# Patient Record
Sex: FEMALE | Race: BLACK OR AFRICAN AMERICAN | Employment: UNEMPLOYED | URBAN - METROPOLITAN AREA
[De-identification: names, ages, dates, MRNs, and addresses within clinical notes are randomized per-mention and may not be internally consistent; named-entity substitution may affect disease eponyms.]

---

## 2017-02-10 ENCOUNTER — HOSPITAL ENCOUNTER (INPATIENT)
Age: 52
LOS: 10 days | Discharge: HOME | DRG: 885 | End: 2017-02-20
Attending: EMERGENCY MEDICINE | Admitting: PSYCHIATRY & NEUROLOGY
Payer: COMMERCIAL

## 2017-02-10 DIAGNOSIS — F32.A DEPRESSION, UNSPECIFIED DEPRESSION TYPE: Primary | ICD-10-CM

## 2017-02-10 LAB — GLUCOSE BLD-MCNC: 240 MG/DL (ref 65–105)

## 2017-02-10 PROCEDURE — 1240000000 HC EMOTIONAL WELLNESS R&B

## 2017-02-10 PROCEDURE — 6360000002 HC RX W HCPCS: Performed by: EMERGENCY MEDICINE

## 2017-02-10 PROCEDURE — 82947 ASSAY GLUCOSE BLOOD QUANT: CPT

## 2017-02-10 PROCEDURE — 99285 EMERGENCY DEPT VISIT HI MDM: CPT

## 2017-02-10 RX ORDER — LORAZEPAM 2 MG/ML
2 INJECTION INTRAMUSCULAR ONCE
Status: COMPLETED | OUTPATIENT
Start: 2017-02-10 | End: 2017-02-10

## 2017-02-10 RX ADMIN — LORAZEPAM 2 MG: 2 INJECTION INTRAMUSCULAR; INTRAVENOUS at 23:33

## 2017-02-10 ASSESSMENT — SLEEP AND FATIGUE QUESTIONNAIRES
DO YOU HAVE DIFFICULTY SLEEPING: YES
DIFFICULTY ARISING: YES
DO YOU USE A SLEEP AID: NO
AVERAGE NUMBER OF SLEEP HOURS: 0
DIFFICULTY FALLING ASLEEP: YES
DIFFICULTY STAYING ASLEEP: YES
RESTFUL SLEEP: NO

## 2017-02-10 ASSESSMENT — ENCOUNTER SYMPTOMS
RESPIRATORY NEGATIVE: 1
EYES NEGATIVE: 1
SHORTNESS OF BREATH: 0
BACK PAIN: 0
ABDOMINAL PAIN: 0
GASTROINTESTINAL NEGATIVE: 1
COUGH: 0

## 2017-02-10 ASSESSMENT — LIFESTYLE VARIABLES: HISTORY_ALCOHOL_USE: YES

## 2017-02-10 ASSESSMENT — PAIN SCALES - GENERAL: PAINLEVEL_OUTOF10: 8

## 2017-02-10 ASSESSMENT — PATIENT HEALTH QUESTIONNAIRE - PHQ9: SUM OF ALL RESPONSES TO PHQ QUESTIONS 1-9: 18

## 2017-02-10 ASSESSMENT — PAIN DESCRIPTION - LOCATION: LOCATION: BACK

## 2017-02-11 LAB — GLUCOSE BLD-MCNC: 191 MG/DL (ref 65–105)

## 2017-02-11 PROCEDURE — 82947 ASSAY GLUCOSE BLOOD QUANT: CPT

## 2017-02-11 PROCEDURE — 1240000000 HC EMOTIONAL WELLNESS R&B

## 2017-02-11 PROCEDURE — 81001 URINALYSIS AUTO W/SCOPE: CPT

## 2017-02-11 PROCEDURE — 80307 DRUG TEST PRSMV CHEM ANLYZR: CPT

## 2017-02-11 PROCEDURE — 6370000000 HC RX 637 (ALT 250 FOR IP): Performed by: PSYCHIATRY & NEUROLOGY

## 2017-02-11 RX ORDER — GABAPENTIN 300 MG/1
300 CAPSULE ORAL 3 TIMES DAILY
Status: DISCONTINUED | OUTPATIENT
Start: 2017-02-11 | End: 2017-02-12

## 2017-02-11 RX ORDER — IBUPROFEN 800 MG/1
800 TABLET ORAL 3 TIMES DAILY PRN
Status: DISCONTINUED | OUTPATIENT
Start: 2017-02-11 | End: 2017-02-15

## 2017-02-11 RX ORDER — DEXTROSE MONOHYDRATE 50 MG/ML
100 INJECTION, SOLUTION INTRAVENOUS PRN
Status: DISCONTINUED | OUTPATIENT
Start: 2017-02-11 | End: 2017-02-20 | Stop reason: HOSPADM

## 2017-02-11 RX ORDER — ESCITALOPRAM OXALATE 10 MG/1
10 TABLET ORAL DAILY
Status: DISCONTINUED | OUTPATIENT
Start: 2017-02-11 | End: 2017-02-12

## 2017-02-11 RX ORDER — INSULIN GLARGINE 100 [IU]/ML
32 INJECTION, SOLUTION SUBCUTANEOUS 2 TIMES DAILY
Status: DISCONTINUED | OUTPATIENT
Start: 2017-02-11 | End: 2017-02-14

## 2017-02-11 RX ORDER — BENZTROPINE MESYLATE 1 MG/ML
2 INJECTION INTRAMUSCULAR; INTRAVENOUS DAILY PRN
Status: DISCONTINUED | OUTPATIENT
Start: 2017-02-11 | End: 2017-02-20 | Stop reason: HOSPADM

## 2017-02-11 RX ORDER — HYDROXYZINE HYDROCHLORIDE 25 MG/1
50 TABLET, FILM COATED ORAL 3 TIMES DAILY PRN
Status: DISCONTINUED | OUTPATIENT
Start: 2017-02-11 | End: 2017-02-20 | Stop reason: HOSPADM

## 2017-02-11 RX ORDER — TRAZODONE HYDROCHLORIDE 150 MG/1
150 TABLET ORAL NIGHTLY
Status: DISCONTINUED | OUTPATIENT
Start: 2017-02-11 | End: 2017-02-20 | Stop reason: HOSPADM

## 2017-02-11 RX ORDER — ACETAMINOPHEN 325 MG/1
650 TABLET ORAL EVERY 4 HOURS PRN
Status: DISCONTINUED | OUTPATIENT
Start: 2017-02-11 | End: 2017-02-20 | Stop reason: HOSPADM

## 2017-02-11 RX ORDER — NICOTINE 21 MG/24HR
1 PATCH, TRANSDERMAL 24 HOURS TRANSDERMAL DAILY
Status: DISCONTINUED | OUTPATIENT
Start: 2017-02-11 | End: 2017-02-20 | Stop reason: HOSPADM

## 2017-02-11 RX ORDER — ZOLPIDEM TARTRATE 10 MG/1
10 TABLET ORAL NIGHTLY PRN
Status: DISCONTINUED | OUTPATIENT
Start: 2017-02-11 | End: 2017-02-12

## 2017-02-11 RX ORDER — DEXTROSE MONOHYDRATE 25 G/50ML
12.5 INJECTION, SOLUTION INTRAVENOUS PRN
Status: DISCONTINUED | OUTPATIENT
Start: 2017-02-11 | End: 2017-02-20 | Stop reason: HOSPADM

## 2017-02-11 RX ORDER — NICOTINE POLACRILEX 4 MG
15 LOZENGE BUCCAL PRN
Status: DISCONTINUED | OUTPATIENT
Start: 2017-02-11 | End: 2017-02-20 | Stop reason: HOSPADM

## 2017-02-11 RX ORDER — MAGNESIUM HYDROXIDE/ALUMINUM HYDROXICE/SIMETHICONE 120; 1200; 1200 MG/30ML; MG/30ML; MG/30ML
20 SUSPENSION ORAL 3 TIMES DAILY PRN
Status: DISCONTINUED | OUTPATIENT
Start: 2017-02-11 | End: 2017-02-20 | Stop reason: HOSPADM

## 2017-02-11 RX ADMIN — ESCITALOPRAM OXALATE 10 MG: 10 TABLET ORAL at 12:47

## 2017-02-11 RX ADMIN — TRAZODONE HYDROCHLORIDE 150 MG: 150 TABLET ORAL at 21:33

## 2017-02-11 RX ADMIN — IBUPROFEN 800 MG: 800 TABLET, FILM COATED ORAL at 21:33

## 2017-02-11 RX ADMIN — IBUPROFEN 800 MG: 800 TABLET, FILM COATED ORAL at 12:47

## 2017-02-11 RX ADMIN — HYDROXYZINE HYDROCHLORIDE 50 MG: 25 TABLET, FILM COATED ORAL at 12:47

## 2017-02-11 RX ADMIN — HYDROXYZINE HYDROCHLORIDE 50 MG: 25 TABLET, FILM COATED ORAL at 21:34

## 2017-02-11 RX ADMIN — GABAPENTIN 300 MG: 300 CAPSULE ORAL at 14:23

## 2017-02-11 RX ADMIN — ZOLPIDEM TARTRATE 10 MG: 10 TABLET, FILM COATED ORAL at 21:33

## 2017-02-11 RX ADMIN — GABAPENTIN 300 MG: 300 CAPSULE ORAL at 21:33

## 2017-02-11 ASSESSMENT — PAIN SCALES - GENERAL
PAINLEVEL_OUTOF10: 8
PAINLEVEL_OUTOF10: 7
PAINLEVEL_OUTOF10: 3

## 2017-02-11 ASSESSMENT — LIFESTYLE VARIABLES: HISTORY_ALCOHOL_USE: NO

## 2017-02-11 ASSESSMENT — SLEEP AND FATIGUE QUESTIONNAIRES
DO YOU USE A SLEEP AID: NO
SLEEP PATTERN: DIFFICULTY FALLING ASLEEP;DISTURBED/INTERRUPTED SLEEP;RESTLESSNESS
DO YOU HAVE DIFFICULTY SLEEPING: YES
AVERAGE NUMBER OF SLEEP HOURS: 2
DIFFICULTY ARISING: YES
DIFFICULTY FALLING ASLEEP: YES
DIFFICULTY STAYING ASLEEP: YES
RESTFUL SLEEP: NO

## 2017-02-11 ASSESSMENT — PATIENT HEALTH QUESTIONNAIRE - PHQ9: SUM OF ALL RESPONSES TO PHQ QUESTIONS 1-9: 18

## 2017-02-12 LAB
CREAT SERPL-MCNC: 1.08 MG/DL (ref 0.5–0.9)
GFR AFRICAN AMERICAN: >60 ML/MIN
GFR NON-AFRICAN AMERICAN: 54 ML/MIN
GFR SERPL CREATININE-BSD FRML MDRD: ABNORMAL ML/MIN/{1.73_M2}
GFR SERPL CREATININE-BSD FRML MDRD: ABNORMAL ML/MIN/{1.73_M2}

## 2017-02-12 PROCEDURE — 6370000000 HC RX 637 (ALT 250 FOR IP): Performed by: PSYCHIATRY & NEUROLOGY

## 2017-02-12 PROCEDURE — 36415 COLL VENOUS BLD VENIPUNCTURE: CPT

## 2017-02-12 PROCEDURE — 1240000000 HC EMOTIONAL WELLNESS R&B

## 2017-02-12 PROCEDURE — 82565 ASSAY OF CREATININE: CPT

## 2017-02-12 RX ORDER — ZOLPIDEM TARTRATE 10 MG/1
10 TABLET ORAL NIGHTLY
Status: DISCONTINUED | OUTPATIENT
Start: 2017-02-12 | End: 2017-02-20 | Stop reason: HOSPADM

## 2017-02-12 RX ORDER — GABAPENTIN 300 MG/1
900 CAPSULE ORAL 3 TIMES DAILY
Status: DISCONTINUED | OUTPATIENT
Start: 2017-02-12 | End: 2017-02-20 | Stop reason: HOSPADM

## 2017-02-12 RX ORDER — CITALOPRAM 40 MG/1
40 TABLET ORAL DAILY
Status: DISCONTINUED | OUTPATIENT
Start: 2017-02-13 | End: 2017-02-20 | Stop reason: HOSPADM

## 2017-02-12 RX ADMIN — IBUPROFEN 800 MG: 800 TABLET, FILM COATED ORAL at 22:06

## 2017-02-12 RX ADMIN — ESCITALOPRAM OXALATE 10 MG: 10 TABLET ORAL at 08:48

## 2017-02-12 RX ADMIN — GABAPENTIN 300 MG: 300 CAPSULE ORAL at 08:48

## 2017-02-12 RX ADMIN — TRAZODONE HYDROCHLORIDE 150 MG: 150 TABLET ORAL at 22:00

## 2017-02-12 RX ADMIN — HYDROXYZINE HYDROCHLORIDE 50 MG: 25 TABLET, FILM COATED ORAL at 15:01

## 2017-02-12 RX ADMIN — IBUPROFEN 800 MG: 800 TABLET, FILM COATED ORAL at 08:48

## 2017-02-12 RX ADMIN — HYDROXYZINE HYDROCHLORIDE 50 MG: 25 TABLET, FILM COATED ORAL at 22:00

## 2017-02-12 RX ADMIN — GABAPENTIN 300 MG: 300 CAPSULE ORAL at 14:20

## 2017-02-12 RX ADMIN — ZOLPIDEM TARTRATE 10 MG: 10 TABLET, COATED ORAL at 22:00

## 2017-02-12 RX ADMIN — GABAPENTIN 900 MG: 300 CAPSULE ORAL at 21:59

## 2017-02-12 ASSESSMENT — PAIN SCALES - GENERAL: PAINLEVEL_OUTOF10: 3

## 2017-02-13 LAB — GLUCOSE BLD-MCNC: 209 MG/DL (ref 65–105)

## 2017-02-13 PROCEDURE — 6370000000 HC RX 637 (ALT 250 FOR IP): Performed by: PSYCHIATRY & NEUROLOGY

## 2017-02-13 PROCEDURE — 1240000000 HC EMOTIONAL WELLNESS R&B

## 2017-02-13 PROCEDURE — 82947 ASSAY GLUCOSE BLOOD QUANT: CPT

## 2017-02-13 RX ORDER — PRAZOSIN HYDROCHLORIDE 1 MG/1
1 CAPSULE ORAL NIGHTLY
Status: DISCONTINUED | OUTPATIENT
Start: 2017-02-13 | End: 2017-02-20 | Stop reason: HOSPADM

## 2017-02-13 RX ADMIN — METFORMIN HYDROCHLORIDE 1000 MG: 500 TABLET, FILM COATED ORAL at 09:15

## 2017-02-13 RX ADMIN — ACETAMINOPHEN 650 MG: 325 TABLET ORAL at 21:34

## 2017-02-13 RX ADMIN — HYDROXYZINE HYDROCHLORIDE 50 MG: 25 TABLET, FILM COATED ORAL at 09:15

## 2017-02-13 RX ADMIN — GABAPENTIN 900 MG: 300 CAPSULE ORAL at 13:41

## 2017-02-13 RX ADMIN — ZOLPIDEM TARTRATE 10 MG: 10 TABLET, COATED ORAL at 21:34

## 2017-02-13 RX ADMIN — CITALOPRAM HYDROBROMIDE 40 MG: 40 TABLET ORAL at 09:15

## 2017-02-13 RX ADMIN — GABAPENTIN 900 MG: 300 CAPSULE ORAL at 09:15

## 2017-02-13 RX ADMIN — TRAZODONE HYDROCHLORIDE 150 MG: 150 TABLET ORAL at 21:34

## 2017-02-13 RX ADMIN — HYDROXYZINE HYDROCHLORIDE 50 MG: 25 TABLET, FILM COATED ORAL at 19:44

## 2017-02-13 RX ADMIN — GABAPENTIN 900 MG: 300 CAPSULE ORAL at 21:34

## 2017-02-13 RX ADMIN — IBUPROFEN 800 MG: 800 TABLET, FILM COATED ORAL at 19:43

## 2017-02-13 RX ADMIN — PRAZOSIN HYDROCHLORIDE 1 MG: 1 CAPSULE ORAL at 21:34

## 2017-02-13 ASSESSMENT — PAIN DESCRIPTION - PAIN TYPE
TYPE: ACUTE PAIN
TYPE: ACUTE PAIN

## 2017-02-13 ASSESSMENT — PAIN SCALES - GENERAL
PAINLEVEL_OUTOF10: 0
PAINLEVEL_OUTOF10: 3
PAINLEVEL_OUTOF10: 3

## 2017-02-13 ASSESSMENT — PAIN DESCRIPTION - LOCATION
LOCATION: HEAD
LOCATION: HEAD

## 2017-02-14 LAB — GLUCOSE BLD-MCNC: 209 MG/DL (ref 65–105)

## 2017-02-14 PROCEDURE — 82947 ASSAY GLUCOSE BLOOD QUANT: CPT

## 2017-02-14 PROCEDURE — 1240000000 HC EMOTIONAL WELLNESS R&B

## 2017-02-14 PROCEDURE — 6370000000 HC RX 637 (ALT 250 FOR IP): Performed by: PSYCHIATRY & NEUROLOGY

## 2017-02-14 RX ORDER — PIOGLITAZONEHYDROCHLORIDE 15 MG/1
30 TABLET ORAL DAILY
Status: DISCONTINUED | OUTPATIENT
Start: 2017-02-14 | End: 2017-02-20 | Stop reason: HOSPADM

## 2017-02-14 RX ORDER — TRAMADOL HYDROCHLORIDE 50 MG/1
50 TABLET ORAL 3 TIMES DAILY PRN
Status: DISCONTINUED | OUTPATIENT
Start: 2017-02-14 | End: 2017-02-20 | Stop reason: HOSPADM

## 2017-02-14 RX ORDER — PIOGLITAZONEHYDROCHLORIDE 30 MG/1
30 TABLET ORAL DAILY
Status: ON HOLD | COMMUNITY
End: 2017-09-12 | Stop reason: HOSPADM

## 2017-02-14 RX ADMIN — PIOGLITAZONE 30 MG: 15 TABLET ORAL at 19:34

## 2017-02-14 RX ADMIN — GABAPENTIN 900 MG: 300 CAPSULE ORAL at 20:34

## 2017-02-14 RX ADMIN — HYDROXYZINE HYDROCHLORIDE 50 MG: 25 TABLET, FILM COATED ORAL at 08:25

## 2017-02-14 RX ADMIN — TRAZODONE HYDROCHLORIDE 150 MG: 150 TABLET ORAL at 20:34

## 2017-02-14 RX ADMIN — PRAZOSIN HYDROCHLORIDE 1 MG: 1 CAPSULE ORAL at 20:34

## 2017-02-14 RX ADMIN — GABAPENTIN 900 MG: 300 CAPSULE ORAL at 13:44

## 2017-02-14 RX ADMIN — CITALOPRAM HYDROBROMIDE 40 MG: 40 TABLET ORAL at 08:20

## 2017-02-14 RX ADMIN — ZOLPIDEM TARTRATE 10 MG: 10 TABLET, COATED ORAL at 20:34

## 2017-02-14 RX ADMIN — METFORMIN HYDROCHLORIDE 1000 MG: 500 TABLET, FILM COATED ORAL at 21:20

## 2017-02-14 RX ADMIN — GABAPENTIN 900 MG: 300 CAPSULE ORAL at 08:20

## 2017-02-14 RX ADMIN — METFORMIN HYDROCHLORIDE 1000 MG: 500 TABLET, FILM COATED ORAL at 08:20

## 2017-02-14 RX ADMIN — IBUPROFEN 800 MG: 800 TABLET, FILM COATED ORAL at 20:34

## 2017-02-14 RX ADMIN — TRAMADOL HYDROCHLORIDE 50 MG: 50 TABLET, FILM COATED ORAL at 19:16

## 2017-02-14 RX ADMIN — HYDROXYZINE HYDROCHLORIDE 50 MG: 25 TABLET, FILM COATED ORAL at 20:34

## 2017-02-14 ASSESSMENT — PAIN DESCRIPTION - PAIN TYPE: TYPE: ACUTE PAIN

## 2017-02-14 ASSESSMENT — PAIN - FUNCTIONAL ASSESSMENT: PAIN_FUNCTIONAL_ASSESSMENT: 0-10

## 2017-02-14 ASSESSMENT — PAIN SCALES - GENERAL
PAINLEVEL_OUTOF10: 9
PAINLEVEL_OUTOF10: 9
PAINLEVEL_OUTOF10: 10

## 2017-02-14 ASSESSMENT — PAIN DESCRIPTION - LOCATION: LOCATION: BACK;SHOULDER

## 2017-02-15 LAB
GLUCOSE BLD-MCNC: 156 MG/DL (ref 65–105)
GLUCOSE BLD-MCNC: 172 MG/DL (ref 65–105)

## 2017-02-15 PROCEDURE — 6370000000 HC RX 637 (ALT 250 FOR IP): Performed by: PSYCHIATRY & NEUROLOGY

## 2017-02-15 PROCEDURE — 82947 ASSAY GLUCOSE BLOOD QUANT: CPT

## 2017-02-15 PROCEDURE — 1240000000 HC EMOTIONAL WELLNESS R&B

## 2017-02-15 RX ORDER — NAPROXEN 500 MG/1
500 TABLET ORAL 2 TIMES DAILY WITH MEALS
Status: DISCONTINUED | OUTPATIENT
Start: 2017-02-15 | End: 2017-02-20 | Stop reason: HOSPADM

## 2017-02-15 RX ADMIN — TRAMADOL HYDROCHLORIDE 50 MG: 50 TABLET, FILM COATED ORAL at 08:27

## 2017-02-15 RX ADMIN — PIOGLITAZONE 30 MG: 15 TABLET ORAL at 08:27

## 2017-02-15 RX ADMIN — METFORMIN HYDROCHLORIDE 1000 MG: 500 TABLET, FILM COATED ORAL at 20:42

## 2017-02-15 RX ADMIN — CITALOPRAM HYDROBROMIDE 40 MG: 40 TABLET ORAL at 08:27

## 2017-02-15 RX ADMIN — TRAMADOL HYDROCHLORIDE 50 MG: 50 TABLET, FILM COATED ORAL at 19:38

## 2017-02-15 RX ADMIN — HYDROXYZINE HYDROCHLORIDE 50 MG: 25 TABLET, FILM COATED ORAL at 19:36

## 2017-02-15 RX ADMIN — IBUPROFEN 800 MG: 800 TABLET, FILM COATED ORAL at 11:40

## 2017-02-15 RX ADMIN — GABAPENTIN 900 MG: 300 CAPSULE ORAL at 08:28

## 2017-02-15 RX ADMIN — ACETAMINOPHEN 650 MG: 325 TABLET ORAL at 12:54

## 2017-02-15 RX ADMIN — GABAPENTIN 900 MG: 300 CAPSULE ORAL at 20:42

## 2017-02-15 RX ADMIN — METFORMIN HYDROCHLORIDE 1000 MG: 500 TABLET, FILM COATED ORAL at 08:27

## 2017-02-15 RX ADMIN — GABAPENTIN 900 MG: 300 CAPSULE ORAL at 14:03

## 2017-02-15 RX ADMIN — NAPROXEN 500 MG: 500 TABLET ORAL at 18:08

## 2017-02-15 RX ADMIN — TRAZODONE HYDROCHLORIDE 150 MG: 150 TABLET ORAL at 20:42

## 2017-02-15 RX ADMIN — ZOLPIDEM TARTRATE 10 MG: 10 TABLET, COATED ORAL at 20:42

## 2017-02-15 RX ADMIN — PRAZOSIN HYDROCHLORIDE 1 MG: 1 CAPSULE ORAL at 20:42

## 2017-02-15 ASSESSMENT — PAIN SCALES - GENERAL
PAINLEVEL_OUTOF10: 3
PAINLEVEL_OUTOF10: 1
PAINLEVEL_OUTOF10: 8
PAINLEVEL_OUTOF10: 10
PAINLEVEL_OUTOF10: 0
PAINLEVEL_OUTOF10: 5
PAINLEVEL_OUTOF10: 5
PAINLEVEL_OUTOF10: 9
PAINLEVEL_OUTOF10: 1

## 2017-02-15 ASSESSMENT — PAIN DESCRIPTION - PAIN TYPE
TYPE: CHRONIC PAIN

## 2017-02-15 ASSESSMENT — PAIN DESCRIPTION - LOCATION
LOCATION: GENERALIZED

## 2017-02-16 LAB — GLUCOSE BLD-MCNC: 110 MG/DL (ref 65–105)

## 2017-02-16 PROCEDURE — 82947 ASSAY GLUCOSE BLOOD QUANT: CPT

## 2017-02-16 PROCEDURE — 1240000000 HC EMOTIONAL WELLNESS R&B

## 2017-02-16 PROCEDURE — 6370000000 HC RX 637 (ALT 250 FOR IP): Performed by: PSYCHIATRY & NEUROLOGY

## 2017-02-16 RX ADMIN — HYDROXYZINE HYDROCHLORIDE 50 MG: 25 TABLET, FILM COATED ORAL at 08:29

## 2017-02-16 RX ADMIN — ZOLPIDEM TARTRATE 10 MG: 10 TABLET, COATED ORAL at 20:39

## 2017-02-16 RX ADMIN — METFORMIN HYDROCHLORIDE 1000 MG: 500 TABLET, FILM COATED ORAL at 20:39

## 2017-02-16 RX ADMIN — GABAPENTIN 900 MG: 300 CAPSULE ORAL at 13:04

## 2017-02-16 RX ADMIN — CITALOPRAM HYDROBROMIDE 40 MG: 40 TABLET ORAL at 08:27

## 2017-02-16 RX ADMIN — NAPROXEN 500 MG: 500 TABLET ORAL at 08:27

## 2017-02-16 RX ADMIN — NAPROXEN 500 MG: 500 TABLET ORAL at 17:30

## 2017-02-16 RX ADMIN — TRAMADOL HYDROCHLORIDE 50 MG: 50 TABLET, FILM COATED ORAL at 20:38

## 2017-02-16 RX ADMIN — METFORMIN HYDROCHLORIDE 1000 MG: 500 TABLET, FILM COATED ORAL at 08:27

## 2017-02-16 RX ADMIN — TRAZODONE HYDROCHLORIDE 150 MG: 150 TABLET ORAL at 20:39

## 2017-02-16 RX ADMIN — ACETAMINOPHEN 650 MG: 325 TABLET ORAL at 12:02

## 2017-02-16 RX ADMIN — TRAMADOL HYDROCHLORIDE 50 MG: 50 TABLET, FILM COATED ORAL at 09:47

## 2017-02-16 RX ADMIN — GABAPENTIN 900 MG: 300 CAPSULE ORAL at 08:26

## 2017-02-16 RX ADMIN — PIOGLITAZONE 30 MG: 15 TABLET ORAL at 08:27

## 2017-02-16 RX ADMIN — GABAPENTIN 900 MG: 300 CAPSULE ORAL at 20:39

## 2017-02-16 RX ADMIN — PRAZOSIN HYDROCHLORIDE 1 MG: 1 CAPSULE ORAL at 20:39

## 2017-02-16 ASSESSMENT — PAIN - FUNCTIONAL ASSESSMENT: PAIN_FUNCTIONAL_ASSESSMENT: 0-10

## 2017-02-16 ASSESSMENT — PAIN SCALES - GENERAL
PAINLEVEL_OUTOF10: 3
PAINLEVEL_OUTOF10: 10
PAINLEVEL_OUTOF10: 6
PAINLEVEL_OUTOF10: 3
PAINLEVEL_OUTOF10: 10
PAINLEVEL_OUTOF10: 5
PAINLEVEL_OUTOF10: 3

## 2017-02-16 ASSESSMENT — PAIN DESCRIPTION - PAIN TYPE: TYPE: CHRONIC PAIN

## 2017-02-16 ASSESSMENT — PAIN DESCRIPTION - LOCATION
LOCATION: BACK
LOCATION: HEAD

## 2017-02-17 LAB
GLUCOSE BLD-MCNC: 114 MG/DL (ref 65–105)
GLUCOSE BLD-MCNC: 78 MG/DL (ref 65–105)

## 2017-02-17 PROCEDURE — 82947 ASSAY GLUCOSE BLOOD QUANT: CPT

## 2017-02-17 PROCEDURE — 6370000000 HC RX 637 (ALT 250 FOR IP): Performed by: PSYCHIATRY & NEUROLOGY

## 2017-02-17 PROCEDURE — 1240000000 HC EMOTIONAL WELLNESS R&B

## 2017-02-17 RX ORDER — ARIPIPRAZOLE 5 MG/1
5 TABLET ORAL DAILY
Status: DISCONTINUED | OUTPATIENT
Start: 2017-02-17 | End: 2017-02-19

## 2017-02-17 RX ADMIN — NAPROXEN 500 MG: 500 TABLET ORAL at 18:02

## 2017-02-17 RX ADMIN — CITALOPRAM HYDROBROMIDE 40 MG: 40 TABLET ORAL at 08:52

## 2017-02-17 RX ADMIN — ARIPIPRAZOLE 5 MG: 5 TABLET ORAL at 12:09

## 2017-02-17 RX ADMIN — TRAMADOL HYDROCHLORIDE 50 MG: 50 TABLET, FILM COATED ORAL at 19:24

## 2017-02-17 RX ADMIN — TRAMADOL HYDROCHLORIDE 50 MG: 50 TABLET, FILM COATED ORAL at 08:52

## 2017-02-17 RX ADMIN — GABAPENTIN 900 MG: 300 CAPSULE ORAL at 15:00

## 2017-02-17 RX ADMIN — NAPROXEN 500 MG: 500 TABLET ORAL at 08:52

## 2017-02-17 RX ADMIN — TRAZODONE HYDROCHLORIDE 150 MG: 150 TABLET ORAL at 20:49

## 2017-02-17 RX ADMIN — ZOLPIDEM TARTRATE 10 MG: 10 TABLET, COATED ORAL at 20:49

## 2017-02-17 RX ADMIN — PIOGLITAZONE 30 MG: 15 TABLET ORAL at 08:51

## 2017-02-17 RX ADMIN — HYDROXYZINE HYDROCHLORIDE 50 MG: 25 TABLET, FILM COATED ORAL at 20:53

## 2017-02-17 RX ADMIN — GABAPENTIN 900 MG: 300 CAPSULE ORAL at 08:52

## 2017-02-17 RX ADMIN — METFORMIN HYDROCHLORIDE 1000 MG: 500 TABLET, FILM COATED ORAL at 08:52

## 2017-02-17 RX ADMIN — ACETAMINOPHEN 650 MG: 325 TABLET ORAL at 20:54

## 2017-02-17 RX ADMIN — PRAZOSIN HYDROCHLORIDE 1 MG: 1 CAPSULE ORAL at 20:49

## 2017-02-17 RX ADMIN — METFORMIN HYDROCHLORIDE 1000 MG: 500 TABLET, FILM COATED ORAL at 20:49

## 2017-02-17 RX ADMIN — GABAPENTIN 900 MG: 300 CAPSULE ORAL at 20:49

## 2017-02-17 ASSESSMENT — PAIN SCALES - GENERAL
PAINLEVEL_OUTOF10: 9
PAINLEVEL_OUTOF10: 3
PAINLEVEL_OUTOF10: 0
PAINLEVEL_OUTOF10: 10
PAINLEVEL_OUTOF10: 10
PAINLEVEL_OUTOF10: 9

## 2017-02-17 ASSESSMENT — PAIN - FUNCTIONAL ASSESSMENT
PAIN_FUNCTIONAL_ASSESSMENT: 0-10
PAIN_FUNCTIONAL_ASSESSMENT: 0-10

## 2017-02-18 LAB
-: ABNORMAL
AMORPHOUS: ABNORMAL
AMPHETAMINE SCREEN URINE: NEGATIVE
BACTERIA: ABNORMAL
BARBITURATE SCREEN URINE: NEGATIVE
BENZODIAZEPINE SCREEN, URINE: NEGATIVE
BILIRUBIN URINE: NEGATIVE
BUPRENORPHINE URINE: NORMAL
CANNABINOID SCREEN URINE: NEGATIVE
CASTS UA: ABNORMAL /LPF
COCAINE METABOLITE, URINE: NEGATIVE
COLOR: YELLOW
COMMENT UA: ABNORMAL
CRYSTALS, UA: ABNORMAL /HPF
EPITHELIAL CELLS UA: ABNORMAL /HPF
GLUCOSE BLD-MCNC: 114 MG/DL (ref 65–105)
GLUCOSE BLD-MCNC: 92 MG/DL (ref 65–105)
GLUCOSE URINE: NEGATIVE
KETONES, URINE: NEGATIVE
LEUKOCYTE ESTERASE, URINE: ABNORMAL
MDMA URINE: NORMAL
METHADONE SCREEN, URINE: NEGATIVE
METHAMPHETAMINE, URINE: NORMAL
MUCUS: ABNORMAL
NITRITE, URINE: NEGATIVE
OPIATES, URINE: NEGATIVE
OTHER OBSERVATIONS UA: ABNORMAL
OXYCODONE SCREEN URINE: NEGATIVE
PH UA: 6 (ref 5–8)
PHENCYCLIDINE, URINE: NEGATIVE
PROPOXYPHENE, URINE: NORMAL
PROTEIN UA: NEGATIVE
RBC UA: ABNORMAL /HPF
RENAL EPITHELIAL, UA: ABNORMAL /HPF
SPECIFIC GRAVITY UA: 1.02 (ref 1–1.03)
TEST INFORMATION: NORMAL
TRICHOMONAS: ABNORMAL
TRICYCLIC ANTIDEPRESSANTS, UR: NORMAL
TURBIDITY: CLEAR
URINE HGB: NEGATIVE
UROBILINOGEN, URINE: NORMAL
WBC UA: ABNORMAL /HPF
YEAST: ABNORMAL

## 2017-02-18 PROCEDURE — 82947 ASSAY GLUCOSE BLOOD QUANT: CPT

## 2017-02-18 PROCEDURE — 6370000000 HC RX 637 (ALT 250 FOR IP): Performed by: PSYCHIATRY & NEUROLOGY

## 2017-02-18 PROCEDURE — 87086 URINE CULTURE/COLONY COUNT: CPT

## 2017-02-18 PROCEDURE — 6360000002 HC RX W HCPCS: Performed by: PSYCHIATRY & NEUROLOGY

## 2017-02-18 PROCEDURE — 1240000000 HC EMOTIONAL WELLNESS R&B

## 2017-02-18 RX ORDER — ONDANSETRON 4 MG/1
4 TABLET, ORALLY DISINTEGRATING ORAL EVERY 4 HOURS PRN
Status: DISCONTINUED | OUTPATIENT
Start: 2017-02-18 | End: 2017-02-20 | Stop reason: HOSPADM

## 2017-02-18 RX ORDER — PROMETHAZINE HYDROCHLORIDE 25 MG/1
25 TABLET ORAL EVERY 6 HOURS PRN
Status: DISCONTINUED | OUTPATIENT
Start: 2017-02-18 | End: 2017-02-20 | Stop reason: HOSPADM

## 2017-02-18 RX ADMIN — METFORMIN HYDROCHLORIDE 1000 MG: 500 TABLET, FILM COATED ORAL at 08:55

## 2017-02-18 RX ADMIN — TRAMADOL HYDROCHLORIDE 50 MG: 50 TABLET, FILM COATED ORAL at 08:55

## 2017-02-18 RX ADMIN — PROMETHAZINE HYDROCHLORIDE 25 MG: 25 TABLET ORAL at 21:12

## 2017-02-18 RX ADMIN — METFORMIN HYDROCHLORIDE 1000 MG: 500 TABLET, FILM COATED ORAL at 21:12

## 2017-02-18 RX ADMIN — PIOGLITAZONE 30 MG: 15 TABLET ORAL at 08:56

## 2017-02-18 RX ADMIN — CITALOPRAM HYDROBROMIDE 40 MG: 40 TABLET ORAL at 08:55

## 2017-02-18 RX ADMIN — TRAZODONE HYDROCHLORIDE 150 MG: 150 TABLET ORAL at 21:12

## 2017-02-18 RX ADMIN — ARIPIPRAZOLE 5 MG: 5 TABLET ORAL at 08:55

## 2017-02-18 RX ADMIN — TRAMADOL HYDROCHLORIDE 50 MG: 50 TABLET, FILM COATED ORAL at 21:12

## 2017-02-18 RX ADMIN — GABAPENTIN 900 MG: 300 CAPSULE ORAL at 21:12

## 2017-02-18 RX ADMIN — PRAZOSIN HYDROCHLORIDE 1 MG: 1 CAPSULE ORAL at 21:12

## 2017-02-18 RX ADMIN — ZOLPIDEM TARTRATE 10 MG: 10 TABLET, COATED ORAL at 21:12

## 2017-02-18 RX ADMIN — GABAPENTIN 900 MG: 300 CAPSULE ORAL at 08:55

## 2017-02-18 RX ADMIN — PROMETHAZINE HYDROCHLORIDE 25 MG: 25 TABLET ORAL at 14:35

## 2017-02-18 RX ADMIN — NAPROXEN 500 MG: 500 TABLET ORAL at 08:56

## 2017-02-18 RX ADMIN — ACETAMINOPHEN 650 MG: 325 TABLET ORAL at 21:13

## 2017-02-18 RX ADMIN — GABAPENTIN 900 MG: 300 CAPSULE ORAL at 14:34

## 2017-02-18 ASSESSMENT — PAIN SCALES - GENERAL
PAINLEVEL_OUTOF10: 3
PAINLEVEL_OUTOF10: 3
PAINLEVEL_OUTOF10: 6
PAINLEVEL_OUTOF10: 0
PAINLEVEL_OUTOF10: 10

## 2017-02-18 ASSESSMENT — PAIN DESCRIPTION - DESCRIPTORS: DESCRIPTORS: ACHING

## 2017-02-18 ASSESSMENT — PAIN DESCRIPTION - ORIENTATION: ORIENTATION: LOWER

## 2017-02-19 LAB
CULTURE: NORMAL
CULTURE: NORMAL
GLUCOSE BLD-MCNC: 88 MG/DL (ref 65–105)
Lab: NORMAL
SPECIMEN DESCRIPTION: NORMAL
SPECIMEN DESCRIPTION: NORMAL
STATUS: NORMAL

## 2017-02-19 PROCEDURE — 6370000000 HC RX 637 (ALT 250 FOR IP): Performed by: PSYCHIATRY & NEUROLOGY

## 2017-02-19 PROCEDURE — 1240000000 HC EMOTIONAL WELLNESS R&B

## 2017-02-19 PROCEDURE — 6360000002 HC RX W HCPCS: Performed by: PSYCHIATRY & NEUROLOGY

## 2017-02-19 PROCEDURE — 82947 ASSAY GLUCOSE BLOOD QUANT: CPT

## 2017-02-19 RX ORDER — ARIPIPRAZOLE 10 MG/1
10 TABLET ORAL DAILY
Status: DISCONTINUED | OUTPATIENT
Start: 2017-02-20 | End: 2017-02-20 | Stop reason: HOSPADM

## 2017-02-19 RX ADMIN — ZOLPIDEM TARTRATE 10 MG: 10 TABLET, COATED ORAL at 20:48

## 2017-02-19 RX ADMIN — GABAPENTIN 900 MG: 300 CAPSULE ORAL at 08:07

## 2017-02-19 RX ADMIN — ARIPIPRAZOLE 5 MG: 5 TABLET ORAL at 08:08

## 2017-02-19 RX ADMIN — TRAZODONE HYDROCHLORIDE 150 MG: 150 TABLET ORAL at 20:48

## 2017-02-19 RX ADMIN — PROMETHAZINE HYDROCHLORIDE 25 MG: 25 TABLET ORAL at 09:37

## 2017-02-19 RX ADMIN — GABAPENTIN 900 MG: 300 CAPSULE ORAL at 14:18

## 2017-02-19 RX ADMIN — METFORMIN HYDROCHLORIDE 1000 MG: 500 TABLET, FILM COATED ORAL at 08:08

## 2017-02-19 RX ADMIN — ACETAMINOPHEN 650 MG: 325 TABLET ORAL at 17:02

## 2017-02-19 RX ADMIN — GABAPENTIN 900 MG: 300 CAPSULE ORAL at 20:48

## 2017-02-19 RX ADMIN — CITALOPRAM HYDROBROMIDE 40 MG: 40 TABLET ORAL at 08:08

## 2017-02-19 RX ADMIN — METFORMIN HYDROCHLORIDE 1000 MG: 500 TABLET, FILM COATED ORAL at 20:48

## 2017-02-19 RX ADMIN — TRAMADOL HYDROCHLORIDE 50 MG: 50 TABLET, FILM COATED ORAL at 08:08

## 2017-02-19 RX ADMIN — TRAMADOL HYDROCHLORIDE 50 MG: 50 TABLET, FILM COATED ORAL at 14:21

## 2017-02-19 RX ADMIN — NAPROXEN 500 MG: 500 TABLET ORAL at 18:05

## 2017-02-19 RX ADMIN — PRAZOSIN HYDROCHLORIDE 1 MG: 1 CAPSULE ORAL at 20:48

## 2017-02-19 RX ADMIN — NAPROXEN 500 MG: 500 TABLET ORAL at 08:08

## 2017-02-19 RX ADMIN — TRAMADOL HYDROCHLORIDE 50 MG: 50 TABLET, FILM COATED ORAL at 20:49

## 2017-02-19 RX ADMIN — PIOGLITAZONE 30 MG: 15 TABLET ORAL at 08:08

## 2017-02-19 ASSESSMENT — PAIN SCALES - GENERAL
PAINLEVEL_OUTOF10: 4
PAINLEVEL_OUTOF10: 4
PAINLEVEL_OUTOF10: 3
PAINLEVEL_OUTOF10: 4
PAINLEVEL_OUTOF10: 8
PAINLEVEL_OUTOF10: 9
PAINLEVEL_OUTOF10: 10
PAINLEVEL_OUTOF10: 5

## 2017-02-19 ASSESSMENT — PAIN DESCRIPTION - PAIN TYPE: TYPE: ACUTE PAIN

## 2017-02-19 ASSESSMENT — PAIN DESCRIPTION - LOCATION
LOCATION: GENERALIZED
LOCATION: GENERALIZED

## 2017-02-19 ASSESSMENT — PAIN - FUNCTIONAL ASSESSMENT: PAIN_FUNCTIONAL_ASSESSMENT: 0-10

## 2017-02-20 VITALS
TEMPERATURE: 97.5 F | OXYGEN SATURATION: 100 % | BODY MASS INDEX: 33.75 KG/M2 | WEIGHT: 210 LBS | HEIGHT: 66 IN | SYSTOLIC BLOOD PRESSURE: 115 MMHG | RESPIRATION RATE: 15 BRPM | DIASTOLIC BLOOD PRESSURE: 75 MMHG | HEART RATE: 82 BPM

## 2017-02-20 LAB — GLUCOSE BLD-MCNC: 101 MG/DL (ref 65–105)

## 2017-02-20 PROCEDURE — 82947 ASSAY GLUCOSE BLOOD QUANT: CPT

## 2017-02-20 PROCEDURE — 6360000002 HC RX W HCPCS: Performed by: PSYCHIATRY & NEUROLOGY

## 2017-02-20 PROCEDURE — 6370000000 HC RX 637 (ALT 250 FOR IP): Performed by: PSYCHIATRY & NEUROLOGY

## 2017-02-20 PROCEDURE — 5130000000 HC BRIDGE APPOINTMENT

## 2017-02-20 RX ORDER — NAPROXEN 500 MG/1
500 TABLET ORAL 2 TIMES DAILY WITH MEALS
Qty: 60 TABLET | Refills: 0 | Status: ON HOLD | OUTPATIENT
Start: 2017-02-20 | End: 2017-09-12 | Stop reason: HOSPADM

## 2017-02-20 RX ORDER — GABAPENTIN 300 MG/1
900 CAPSULE ORAL 3 TIMES DAILY
Qty: 63 CAPSULE | Refills: 0 | Status: ON HOLD | OUTPATIENT
Start: 2017-02-20 | End: 2017-09-12 | Stop reason: HOSPADM

## 2017-02-20 RX ORDER — TRAMADOL HYDROCHLORIDE 50 MG/1
50 TABLET ORAL 3 TIMES DAILY PRN
Qty: 21 TABLET | Refills: 0 | Status: ON HOLD | OUTPATIENT
Start: 2017-02-20 | End: 2017-09-12 | Stop reason: HOSPADM

## 2017-02-20 RX ORDER — HYDROXYZINE 50 MG/1
50 TABLET, FILM COATED ORAL 3 TIMES DAILY PRN
Qty: 90 TABLET | Refills: 0 | Status: ON HOLD | OUTPATIENT
Start: 2017-02-20 | End: 2017-09-12 | Stop reason: HOSPADM

## 2017-02-20 RX ORDER — PROMETHAZINE HYDROCHLORIDE 25 MG/1
25 TABLET ORAL 3 TIMES DAILY PRN
Qty: 21 TABLET | Refills: 0 | Status: SHIPPED | OUTPATIENT
Start: 2017-02-20 | End: 2017-02-27

## 2017-02-20 RX ORDER — ZOLPIDEM TARTRATE 10 MG/1
10 TABLET ORAL NIGHTLY
Qty: 30 TABLET | Refills: 0 | Status: ON HOLD | OUTPATIENT
Start: 2017-02-20 | End: 2017-09-12 | Stop reason: HOSPADM

## 2017-02-20 RX ORDER — TRAZODONE HYDROCHLORIDE 150 MG/1
150 TABLET ORAL NIGHTLY
Qty: 30 TABLET | Refills: 0 | Status: ON HOLD | OUTPATIENT
Start: 2017-02-20 | End: 2017-09-12 | Stop reason: HOSPADM

## 2017-02-20 RX ORDER — CITALOPRAM 40 MG/1
40 TABLET ORAL DAILY
Qty: 30 TABLET | Refills: 0 | Status: ON HOLD | OUTPATIENT
Start: 2017-02-20 | End: 2017-09-12 | Stop reason: HOSPADM

## 2017-02-20 RX ORDER — ARIPIPRAZOLE 10 MG/1
10 TABLET ORAL DAILY
Qty: 30 TABLET | Refills: 0 | Status: ON HOLD | OUTPATIENT
Start: 2017-02-20 | End: 2017-09-12 | Stop reason: HOSPADM

## 2017-02-20 RX ORDER — PRAZOSIN HYDROCHLORIDE 1 MG/1
1 CAPSULE ORAL NIGHTLY
Qty: 30 CAPSULE | Refills: 0 | Status: ON HOLD | OUTPATIENT
Start: 2017-02-20 | End: 2017-09-12 | Stop reason: HOSPADM

## 2017-02-20 RX ADMIN — GABAPENTIN 900 MG: 300 CAPSULE ORAL at 08:38

## 2017-02-20 RX ADMIN — PROMETHAZINE HYDROCHLORIDE 25 MG: 25 TABLET ORAL at 08:37

## 2017-02-20 RX ADMIN — METFORMIN HYDROCHLORIDE 1000 MG: 500 TABLET, FILM COATED ORAL at 08:37

## 2017-02-20 RX ADMIN — NAPROXEN 500 MG: 500 TABLET ORAL at 08:38

## 2017-02-20 RX ADMIN — TRAMADOL HYDROCHLORIDE 50 MG: 50 TABLET, FILM COATED ORAL at 14:38

## 2017-02-20 RX ADMIN — PIOGLITAZONE 30 MG: 15 TABLET ORAL at 08:40

## 2017-02-20 RX ADMIN — CITALOPRAM HYDROBROMIDE 40 MG: 40 TABLET ORAL at 08:37

## 2017-02-20 RX ADMIN — TRAMADOL HYDROCHLORIDE 50 MG: 50 TABLET, FILM COATED ORAL at 08:37

## 2017-02-20 RX ADMIN — ARIPIPRAZOLE 10 MG: 10 TABLET ORAL at 08:38

## 2017-02-20 RX ADMIN — GABAPENTIN 900 MG: 300 CAPSULE ORAL at 13:50

## 2017-02-20 ASSESSMENT — PAIN SCALES - GENERAL
PAINLEVEL_OUTOF10: 10
PAINLEVEL_OUTOF10: 0
PAINLEVEL_OUTOF10: 10
PAINLEVEL_OUTOF10: 0
PAINLEVEL_OUTOF10: 4
PAINLEVEL_OUTOF10: 0

## 2017-02-20 ASSESSMENT — PAIN DESCRIPTION - ORIENTATION: ORIENTATION: LOWER

## 2017-02-20 ASSESSMENT — PAIN DESCRIPTION - LOCATION: LOCATION: BACK

## 2017-02-20 ASSESSMENT — PAIN DESCRIPTION - PAIN TYPE: TYPE: CHRONIC PAIN

## 2017-03-09 ENCOUNTER — HOSPITAL ENCOUNTER (EMERGENCY)
Age: 52
Discharge: HOME | End: 2017-03-09
Attending: EMERGENCY MEDICINE
Payer: COMMERCIAL

## 2017-03-09 ENCOUNTER — APPOINTMENT (OUTPATIENT)
Dept: GENERAL RADIOLOGY | Age: 52
End: 2017-03-09
Payer: COMMERCIAL

## 2017-03-09 VITALS
RESPIRATION RATE: 18 BRPM | HEIGHT: 66 IN | HEART RATE: 102 BPM | SYSTOLIC BLOOD PRESSURE: 123 MMHG | TEMPERATURE: 100.8 F | DIASTOLIC BLOOD PRESSURE: 76 MMHG | BODY MASS INDEX: 32.14 KG/M2 | OXYGEN SATURATION: 97 % | WEIGHT: 200 LBS

## 2017-03-09 DIAGNOSIS — S93.602A FOOT SPRAIN, LEFT, INITIAL ENCOUNTER: Primary | ICD-10-CM

## 2017-03-09 PROCEDURE — 73610 X-RAY EXAM OF ANKLE: CPT

## 2017-03-09 PROCEDURE — 6370000000 HC RX 637 (ALT 250 FOR IP): Performed by: EMERGENCY MEDICINE

## 2017-03-09 PROCEDURE — 99284 EMERGENCY DEPT VISIT MOD MDM: CPT

## 2017-03-09 PROCEDURE — 73630 X-RAY EXAM OF FOOT: CPT

## 2017-03-09 RX ORDER — IBUPROFEN 400 MG/1
400 TABLET ORAL ONCE
Status: COMPLETED | OUTPATIENT
Start: 2017-03-09 | End: 2017-03-09

## 2017-03-09 RX ADMIN — IBUPROFEN 400 MG: 400 TABLET, FILM COATED ORAL at 10:22

## 2017-03-09 ASSESSMENT — PAIN SCALES - GENERAL: PAINLEVEL_OUTOF10: 8

## 2017-03-09 ASSESSMENT — PAIN DESCRIPTION - LOCATION: LOCATION: ANKLE;FOOT

## 2017-03-09 ASSESSMENT — PAIN DESCRIPTION - ORIENTATION: ORIENTATION: LEFT

## 2017-03-09 ASSESSMENT — PAIN DESCRIPTION - PAIN TYPE: TYPE: ACUTE PAIN

## 2017-03-09 ASSESSMENT — PAIN DESCRIPTION - DESCRIPTORS: DESCRIPTORS: THROBBING

## 2017-09-04 ENCOUNTER — HOSPITAL ENCOUNTER (EMERGENCY)
Age: 52
Discharge: PSYCHIATRIC OR SUBSTANCE USE FACILITY | End: 2017-09-05
Attending: EMERGENCY MEDICINE
Payer: COMMERCIAL

## 2017-09-04 VITALS
DIASTOLIC BLOOD PRESSURE: 73 MMHG | RESPIRATION RATE: 17 BRPM | SYSTOLIC BLOOD PRESSURE: 124 MMHG | TEMPERATURE: 98.2 F | OXYGEN SATURATION: 96 % | HEART RATE: 102 BPM

## 2017-09-04 DIAGNOSIS — R45.851 SUICIDAL IDEATION: ICD-10-CM

## 2017-09-04 DIAGNOSIS — L98.491 SKIN ULCER, LIMITED TO BREAKDOWN OF SKIN (HCC): ICD-10-CM

## 2017-09-04 DIAGNOSIS — R10.84 GENERALIZED ABDOMINAL PAIN: Primary | ICD-10-CM

## 2017-09-04 PROCEDURE — 99285 EMERGENCY DEPT VISIT HI MDM: CPT

## 2017-09-04 PROCEDURE — 85025 COMPLETE CBC W/AUTO DIFF WBC: CPT

## 2017-09-04 PROCEDURE — 80053 COMPREHEN METABOLIC PANEL: CPT

## 2017-09-04 PROCEDURE — 83690 ASSAY OF LIPASE: CPT

## 2017-09-04 PROCEDURE — 84703 CHORIONIC GONADOTROPIN ASSAY: CPT

## 2017-09-04 RX ORDER — ONDANSETRON 2 MG/ML
4 INJECTION INTRAMUSCULAR; INTRAVENOUS ONCE
Status: COMPLETED | OUTPATIENT
Start: 2017-09-04 | End: 2017-09-05

## 2017-09-04 ASSESSMENT — PAIN DESCRIPTION - DESCRIPTORS: DESCRIPTORS: ACHING

## 2017-09-04 ASSESSMENT — PAIN DESCRIPTION - LOCATION: LOCATION: ABDOMEN

## 2017-09-04 ASSESSMENT — PAIN SCALES - GENERAL: PAINLEVEL_OUTOF10: 10

## 2017-09-04 ASSESSMENT — PAIN DESCRIPTION - ORIENTATION: ORIENTATION: LOWER;RIGHT;LEFT

## 2017-09-04 ASSESSMENT — PAIN DESCRIPTION - PAIN TYPE: TYPE: ACUTE PAIN

## 2017-09-04 NOTE — ED AVS SNAPSHOT
Avoid eating any spicy food and milk type products, drinks that have caffeine in it. Use ibuprofen or Tylenol (unless prescribed medications that have Tylenol in it) for pain. You can take over the counter Ibuprofen (advil) tablets (4 every 8 hours or 3 every 6 hours or 2 every 4 hours)    Return to the Emergency Department within 8  12 hours if you have any of the following: worsening of pain in your abdomen, no food sounds good to you, you continue to vomit, you develop a fever, pain goes to your back, or you have pain in the abdomen when going over a bump in the car or when you jump up and down, or you develop vaginal bleeding or discharge, or for any other concerns you may have. Abdominal Pain: Care Instructions  Your Care Instructions    Abdominal pain has many possible causes. Some aren't serious and get better on their own in a few days. Others need more testing and treatment. If your pain continues or gets worse, you need to be rechecked and may need more tests to find out what is wrong. You may need surgery to correct the problem. Don't ignore new symptoms, such as fever, nausea and vomiting, urination problems, pain that gets worse, and dizziness. These may be signs of a more serious problem. Your doctor may have recommended a follow-up visit in the next 8 to 12 hours. If you are not getting better, you may need more tests or treatment. The doctor has checked you carefully, but problems can develop later. If you notice any problems or new symptoms, get medical treatment right away. Follow-up care is a key part of your treatment and safety. Be sure to make and go to all appointments, and call your doctor if you are having problems. It's also a good idea to know your test results and keep a list of the medicines you take. How can you care for yourself at home? · Rest until you feel better.   · To prevent dehydration, drink plenty of fluids, enough so that your about \"Abdominal Pain: Care Instructions. \"     If you do not have an account, please click on the \"Sign Up Now\" link. Current as of: May 27, 2016  Content Version: 11.2  © 7738-6845 [employer]. Care instructions adapted under license by [employer] [facility] ([facility]). If you have questions about a medical condition or this instruction, always ask your healthcare professional. [address] any warranty or liability for your use of this information. [facility] Emergency [address] [facility]  [address] [facility]  [phone]   Pediatric Primary Care/ Adult Primary Care / OB/Prenatal/GYN/Specialty Clinics   Mon  Fri  8a  4:30p  -----------------------------------------------------------  KHLOE AGUILAR [facility] PRIMARY CARE ANNEX  [address] [facility]\""  [phone]   Assistance with applying for chronic long term meds (high BP, Diabetes, etc), offered thru programs made available by various pharmaceutical companies   Mon  Fri           Call to make appointment  -----------------------------------------------------------  [address]  [phone]   Pediatrics and [address] Fri      9a  7p  ------------------------------------------------------------  [address] [address] [facility]  [phone]    Adult Medicine, Pediatrics, OB/GYN      Mon  Fri   8:30a  5p  -----------------------------------------------------------  TAYE STACY  Surgery Clinic  [address] [place] [address]    Wed AM each week    -------------------------------------------------------------                                        [address]     Adult Internal Medicine      [place], [place]    8a  4p       [phone]         Wed  1p  Riaz Tripathi  [phone] Mon, Ivory Savage Fri    10a  4p  Wed  1p  4p  (closed from 12p noon [address] [phone] Josue Juan Carlos, Fri    8:30 a  4:15p  Wed  12:30p  4:15p    [facility]  [phone]  Mon, Wed, Fri     1:00p to [address]  [address] SH[address] Orozco   [phone]   Pediatric Primary Care Mon  Wed & Fri  Adult Primary Care  Mon  Fri 8a  12p noon  OB/Prenatal   Thurs  8a  4:45p      [facility] MEDICINE - B  [address] [place] [address] Fri 8:30a  5p   OB/GYN [phone]   Adult Internal Med [phone]  Pediatrics [phone]  Neuro/Headache   [address] [place] [address]  [phone]   [facility] AKS     9a  5p    [address]  [address] [facility]  [phone] Adult Medicine, Eye Clinic, Dental  Patient must be certified homeless   Days and hours vary  Call for appointment     [address]  [phone]     OB [phone]   Claudeen Cousin, [place], Fri   9a  5p  Thurs  9a  6p     Wed ([place] only)    [address]  [phone]   [facility] Tues [facility], Fri  9a  5p    (closed 12p 1p)  Wed  1p  5  -----------------------------------------------------------  [address]   [phone]   Burn/Plastic, ENT, GI, Orthopedics, (Orthopedics D.O.), Surgical, TRAUMA, Urology, Vascular Call for appointment  ----------------------------------------------------    [facility] 120  [phone] [address] [place] [address] 5:30    [address], [address]  [phone]    OB/Prenatal     Magali Payer  4:45p  [facility] & Fri   8a  4:45p    [address]  [address] [facility]  [phone]  Family [facility]    [address] [address]  Psychiatric  [address] [facility], [id number]  [phone] Mon  Fri 8a  4:30p    [address]  [place], [address] [facility]  [phone]  Mon  Fri  8a-4:30p    Thurs  8a  8p [facility] [address]  [address]     [phone]  Mon Joseluis Santiago 5P    Diabetic Education Services  [phone]  Call for appointment    Heart Failure Clinic  [address] [facility],[address] Floor  [phone] Fri  8:30a [address] [facility] First Call for Help    [facility] ([facility])  [phone]    [phone] or toll-free [phone] [address] [place] [address] for the [facility]  [address] [place] [address]  [phone]    [address] [facility] Cristi [phone]     * Pt must have source of income & must     * Pt must be homeless; call for eligibility guidelines        bring 2 recent check stubs to appt                               * Under age 25 not accepted     * By appointment only       * Doors open at 8:30a  day of week varies    [address] [facility]  [phone]     * By appt only  Mon  Fri  8a  5p   * One Evening/week  * $25.00 for initial consultation, exam, cleaning  * Does not accept Medicaid

## 2017-09-05 ENCOUNTER — HOSPITAL ENCOUNTER (INPATIENT)
Age: 52
LOS: 7 days | Discharge: HOME | DRG: 885 | End: 2017-09-12
Attending: PSYCHIATRY & NEUROLOGY | Admitting: PSYCHIATRY & NEUROLOGY
Payer: COMMERCIAL

## 2017-09-05 LAB
-: ABNORMAL
ABSOLUTE EOS #: 0.18 K/UL (ref 0–0.4)
ABSOLUTE LYMPH #: 1.69 K/UL (ref 1–4.8)
ABSOLUTE MONO #: 0.27 K/UL (ref 0.1–0.8)
ALBUMIN SERPL-MCNC: 4.4 G/DL (ref 3.5–5.2)
ALBUMIN/GLOBULIN RATIO: 1.1 (ref 1–2.5)
ALP BLD-CCNC: 87 U/L (ref 35–104)
ALT SERPL-CCNC: 14 U/L (ref 5–33)
AMORPHOUS: ABNORMAL
ANION GAP SERPL CALCULATED.3IONS-SCNC: 16 MMOL/L (ref 9–17)
AST SERPL-CCNC: 17 U/L
BACTERIA: ABNORMAL
BASOPHILS # BLD: 0 %
BASOPHILS ABSOLUTE: 0 K/UL (ref 0–0.2)
BILIRUB SERPL-MCNC: 0.22 MG/DL (ref 0.3–1.2)
BILIRUBIN URINE: ABNORMAL
BUN BLDV-MCNC: 33 MG/DL (ref 6–20)
BUN/CREAT BLD: ABNORMAL (ref 9–20)
CALCIUM SERPL-MCNC: 9.7 MG/DL (ref 8.6–10.4)
CASTS UA: ABNORMAL /LPF (ref 0–2)
CHLORIDE BLD-SCNC: 96 MMOL/L (ref 98–107)
CHP ED QC CHECK: YES
CO2: 23 MMOL/L (ref 20–31)
COLOR: YELLOW
CREAT SERPL-MCNC: 1.37 MG/DL (ref 0.5–0.9)
CRYSTALS, UA: ABNORMAL /HPF
DIFFERENTIAL TYPE: ABNORMAL
EOSINOPHILS RELATIVE PERCENT: 2 %
EPITHELIAL CELLS UA: ABNORMAL /HPF (ref 0–5)
GFR AFRICAN AMERICAN: 49 ML/MIN
GFR NON-AFRICAN AMERICAN: 41 ML/MIN
GFR SERPL CREATININE-BSD FRML MDRD: ABNORMAL ML/MIN/{1.73_M2}
GFR SERPL CREATININE-BSD FRML MDRD: ABNORMAL ML/MIN/{1.73_M2}
GLUCOSE BLD-MCNC: 217 MG/DL (ref 65–105)
GLUCOSE BLD-MCNC: 268 MG/DL
GLUCOSE BLD-MCNC: 268 MG/DL (ref 65–105)
GLUCOSE BLD-MCNC: 310 MG/DL (ref 65–105)
GLUCOSE BLD-MCNC: 334 MG/DL (ref 65–105)
GLUCOSE BLD-MCNC: 415 MG/DL (ref 70–99)
GLUCOSE URINE: ABNORMAL
HCG QUALITATIVE: NEGATIVE
HCT VFR BLD CALC: 36.3 % (ref 36–46)
HEMOGLOBIN: 12.1 G/DL (ref 12–16)
KETONES, URINE: ABNORMAL
LEUKOCYTE ESTERASE, URINE: NEGATIVE
LIPASE: 21 U/L (ref 13–60)
LYMPHOCYTES # BLD: 19 %
MCH RBC QN AUTO: 31.8 PG (ref 26–34)
MCHC RBC AUTO-ENTMCNC: 33.2 G/DL (ref 31–37)
MCV RBC AUTO: 95.7 FL (ref 80–100)
MONOCYTES # BLD: 3 %
MORPHOLOGY: NORMAL
MUCUS: ABNORMAL
NITRITE, URINE: NEGATIVE
OTHER OBSERVATIONS UA: ABNORMAL
PDW BLD-RTO: 13 % (ref 12.5–15.4)
PH UA: 5.5 (ref 5–8)
PLATELET # BLD: 358 K/UL (ref 140–450)
PLATELET ESTIMATE: ABNORMAL
PMV BLD AUTO: 9 FL (ref 6–12)
POTASSIUM SERPL-SCNC: 4.2 MMOL/L (ref 3.7–5.3)
PROTEIN UA: ABNORMAL
RBC # BLD: 3.8 M/UL (ref 4–5.2)
RBC # BLD: ABNORMAL 10*6/UL
RBC UA: ABNORMAL /HPF (ref 0–2)
RENAL EPITHELIAL, UA: ABNORMAL /HPF
SEG NEUTROPHILS: 76 %
SEGMENTED NEUTROPHILS ABSOLUTE COUNT: 6.76 K/UL (ref 1.8–7.7)
SODIUM BLD-SCNC: 135 MMOL/L (ref 135–144)
SPECIFIC GRAVITY UA: 1.02 (ref 1–1.03)
TOTAL PROTEIN: 8.5 G/DL (ref 6.4–8.3)
TRICHOMONAS: ABNORMAL
TURBIDITY: CLEAR
URINE HGB: NEGATIVE
UROBILINOGEN, URINE: NORMAL
WBC # BLD: 8.9 K/UL (ref 3.5–11)
WBC # BLD: ABNORMAL 10*3/UL
WBC UA: ABNORMAL /HPF (ref 0–5)
YEAST: ABNORMAL

## 2017-09-05 PROCEDURE — 82947 ASSAY GLUCOSE BLOOD QUANT: CPT

## 2017-09-05 PROCEDURE — 2580000003 HC RX 258: Performed by: EMERGENCY MEDICINE

## 2017-09-05 PROCEDURE — 1240000000 HC EMOTIONAL WELLNESS R&B

## 2017-09-05 PROCEDURE — 6370000000 HC RX 637 (ALT 250 FOR IP): Performed by: EMERGENCY MEDICINE

## 2017-09-05 PROCEDURE — 96374 THER/PROPH/DIAG INJ IV PUSH: CPT

## 2017-09-05 PROCEDURE — 81001 URINALYSIS AUTO W/SCOPE: CPT

## 2017-09-05 PROCEDURE — 6360000002 HC RX W HCPCS: Performed by: EMERGENCY MEDICINE

## 2017-09-05 PROCEDURE — 6370000000 HC RX 637 (ALT 250 FOR IP): Performed by: PSYCHIATRY & NEUROLOGY

## 2017-09-05 RX ORDER — CEPHALEXIN 500 MG/1
500 CAPSULE ORAL 3 TIMES DAILY
Qty: 20 CAPSULE | Refills: 0 | Status: ON HOLD | OUTPATIENT
Start: 2017-09-05 | End: 2017-09-12 | Stop reason: HOSPADM

## 2017-09-05 RX ORDER — CEPHALEXIN 500 MG/1
500 CAPSULE ORAL ONCE
Status: COMPLETED | OUTPATIENT
Start: 2017-09-05 | End: 2017-09-05

## 2017-09-05 RX ORDER — CEPHALEXIN 500 MG/1
500 CAPSULE ORAL DAILY
Status: DISCONTINUED | OUTPATIENT
Start: 2017-09-05 | End: 2017-09-05

## 2017-09-05 RX ORDER — TRAMADOL HYDROCHLORIDE 50 MG/1
50 TABLET ORAL 3 TIMES DAILY PRN
Status: DISCONTINUED | OUTPATIENT
Start: 2017-09-05 | End: 2017-09-12

## 2017-09-05 RX ORDER — CEPHALEXIN 500 MG/1
500 CAPSULE ORAL DAILY
Status: DISCONTINUED | OUTPATIENT
Start: 2017-09-06 | End: 2017-09-05

## 2017-09-05 RX ORDER — NAPROXEN 500 MG/1
500 TABLET ORAL 2 TIMES DAILY WITH MEALS
Status: DISCONTINUED | OUTPATIENT
Start: 2017-09-05 | End: 2017-09-06

## 2017-09-05 RX ORDER — ARIPIPRAZOLE 10 MG/1
10 TABLET ORAL DAILY
Status: DISCONTINUED | OUTPATIENT
Start: 2017-09-05 | End: 2017-09-12 | Stop reason: HOSPADM

## 2017-09-05 RX ORDER — CITALOPRAM 40 MG/1
40 TABLET ORAL DAILY
Status: DISCONTINUED | OUTPATIENT
Start: 2017-09-05 | End: 2017-09-12 | Stop reason: HOSPADM

## 2017-09-05 RX ORDER — TRAZODONE HYDROCHLORIDE 150 MG/1
150 TABLET ORAL NIGHTLY PRN
Status: DISCONTINUED | OUTPATIENT
Start: 2017-09-05 | End: 2017-09-12

## 2017-09-05 RX ORDER — HYDROXYZINE HYDROCHLORIDE 25 MG/1
50 TABLET, FILM COATED ORAL 3 TIMES DAILY PRN
Status: DISCONTINUED | OUTPATIENT
Start: 2017-09-05 | End: 2017-09-12 | Stop reason: HOSPADM

## 2017-09-05 RX ORDER — 0.9 % SODIUM CHLORIDE 0.9 %
1000 INTRAVENOUS SOLUTION INTRAVENOUS ONCE
Status: COMPLETED | OUTPATIENT
Start: 2017-09-05 | End: 2017-09-05

## 2017-09-05 RX ORDER — CEPHALEXIN 500 MG/1
500 CAPSULE ORAL 3 TIMES DAILY
Status: COMPLETED | OUTPATIENT
Start: 2017-09-05 | End: 2017-09-12

## 2017-09-05 RX ORDER — PIOGLITAZONEHYDROCHLORIDE 15 MG/1
30 TABLET ORAL DAILY
Status: DISCONTINUED | OUTPATIENT
Start: 2017-09-05 | End: 2017-09-12 | Stop reason: HOSPADM

## 2017-09-05 RX ORDER — PRAZOSIN HYDROCHLORIDE 1 MG/1
1 CAPSULE ORAL NIGHTLY
Status: DISCONTINUED | OUTPATIENT
Start: 2017-09-05 | End: 2017-09-06

## 2017-09-05 RX ORDER — GABAPENTIN 300 MG/1
900 CAPSULE ORAL 3 TIMES DAILY
Status: DISCONTINUED | OUTPATIENT
Start: 2017-09-05 | End: 2017-09-12 | Stop reason: HOSPADM

## 2017-09-05 RX ADMIN — METFORMIN HYDROCHLORIDE 1000 MG: 500 TABLET, FILM COATED ORAL at 17:39

## 2017-09-05 RX ADMIN — GABAPENTIN 900 MG: 300 CAPSULE ORAL at 13:32

## 2017-09-05 RX ADMIN — NAPROXEN 500 MG: 500 TABLET ORAL at 17:39

## 2017-09-05 RX ADMIN — TRAZODONE HYDROCHLORIDE 150 MG: 150 TABLET ORAL at 21:36

## 2017-09-05 RX ADMIN — ONDANSETRON 4 MG: 2 INJECTION, SOLUTION INTRAMUSCULAR; INTRAVENOUS at 00:25

## 2017-09-05 RX ADMIN — PRAZOSIN HYDROCHLORIDE 1 MG: 1 CAPSULE ORAL at 21:36

## 2017-09-05 RX ADMIN — TRAMADOL HYDROCHLORIDE 50 MG: 50 TABLET, FILM COATED ORAL at 13:32

## 2017-09-05 RX ADMIN — CEPHALEXIN 500 MG: 500 CAPSULE ORAL at 05:42

## 2017-09-05 RX ADMIN — CEPHALEXIN 500 MG: 500 CAPSULE ORAL at 13:32

## 2017-09-05 RX ADMIN — ARIPIPRAZOLE 10 MG: 10 TABLET ORAL at 13:32

## 2017-09-05 RX ADMIN — SODIUM CHLORIDE 1000 ML: 9 INJECTION, SOLUTION INTRAVENOUS at 01:30

## 2017-09-05 RX ADMIN — GABAPENTIN 900 MG: 300 CAPSULE ORAL at 21:36

## 2017-09-05 RX ADMIN — TRAMADOL HYDROCHLORIDE 50 MG: 50 TABLET, FILM COATED ORAL at 21:40

## 2017-09-05 RX ADMIN — PIOGLITAZONE 30 MG: 15 TABLET ORAL at 13:32

## 2017-09-05 RX ADMIN — CITALOPRAM HYDROBROMIDE 40 MG: 40 TABLET ORAL at 13:32

## 2017-09-05 ASSESSMENT — SLEEP AND FATIGUE QUESTIONNAIRES
DIFFICULTY ARISING: NO
SLEEP PATTERN: DIFFICULTY FALLING ASLEEP
DO YOU USE A SLEEP AID: YES
DO YOU HAVE DIFFICULTY SLEEPING: YES
RESTFUL SLEEP: YES
DIFFICULTY FALLING ASLEEP: YES
DIFFICULTY STAYING ASLEEP: NO
AVERAGE NUMBER OF SLEEP HOURS: 4

## 2017-09-05 ASSESSMENT — ENCOUNTER SYMPTOMS
SHORTNESS OF BREATH: 0
CHEST TIGHTNESS: 0
VOMITING: 0
SORE THROAT: 0
NAUSEA: 1
ABDOMINAL PAIN: 1
CONSTIPATION: 0
DIARRHEA: 0

## 2017-09-05 ASSESSMENT — PATIENT HEALTH QUESTIONNAIRE - PHQ9: SUM OF ALL RESPONSES TO PHQ QUESTIONS 1-9: 13

## 2017-09-05 ASSESSMENT — PAIN DESCRIPTION - DESCRIPTORS: DESCRIPTORS: ACHING

## 2017-09-05 ASSESSMENT — PAIN DESCRIPTION - LOCATION
LOCATION: GENERALIZED

## 2017-09-05 ASSESSMENT — PAIN SCALES - GENERAL
PAINLEVEL_OUTOF10: 0
PAINLEVEL_OUTOF10: 2
PAINLEVEL_OUTOF10: 10
PAINLEVEL_OUTOF10: 2
PAINLEVEL_OUTOF10: 8
PAINLEVEL_OUTOF10: 10
PAINLEVEL_OUTOF10: 6

## 2017-09-05 ASSESSMENT — PAIN DESCRIPTION - PAIN TYPE
TYPE: ACUTE PAIN
TYPE: CHRONIC PAIN
TYPE: CHRONIC PAIN

## 2017-09-05 ASSESSMENT — PAIN DESCRIPTION - ORIENTATION: ORIENTATION: RIGHT;LEFT

## 2017-09-05 ASSESSMENT — LIFESTYLE VARIABLES: HISTORY_ALCOHOL_USE: NO

## 2017-09-05 NOTE — ED PROVIDER NOTES
98.2 °F (36.8 °C),  Pulse: 102, Resp: 17, BP: 124/73, SpO2: 96 %    This patient is a 50 y.o. Female with lower abdominal pain and dysuria. Keflex prescribed for presumed urinary tract infection by prior resident. He reports the patient was discharged and then became suicidal.  She [facility] and is awaiting transport to [facility]    ED Course         OUTSTANDING TASKS / RECOMMENDATIONS:    1. None, awaiting transport     FINAL IMPRESSION:     1. Generalized abdominal pain    2.  Skin ulcer, limited to breakdown of skin ([facility] [address]        DISPOSITION:         DISPOSITION:  []  Discharge   [x]  Transfer -    []  Admission -     []  Against Medical Advice   []  Eloped   FOLLOW-UP: PCP from clinic list    Schedule an appointment as soon as possible for a visit  As needed     DISCHARGE MEDICATIONS: New Prescriptions    CEPHALEXIN (KEFLEX) 500 MG CAPSULE    Take 1 capsule by mouth 3 times daily for 7 days          Gaye Velásquez DO  Emergency Medicine Resident  [place], [place]  Resident  09/05/17 1990

## 2017-09-05 NOTE — ED PROVIDER NOTES
[facility]     Emergency Department     Faculty Attestation    I performed a history and physical examination of the patient and discussed management with the resident. I reviewed the residents note and agree with the documented findings and plan of care. Any areas of disagreement are noted on the chart. I was personally present for the key portions of any procedures. I have documented in the chart those procedures where I was not present during the key portions. I have reviewed the emergency nurses triage note. I agree with the chief complaint, past medical history, past surgical history, allergies, medications, social and family history as documented unless otherwise noted below. For Physician Assistant/ Nurse Practitioner cases/documentation I have personally evaluated this patient and have completed at least one if not all key elements of the E/M (history, physical exam, and MDM). Additional findings are as noted. I have personally seen and evaluated the patient. I find the patient's history and physical exam are consistent with the NP/PA documentation. I agree with the care provided, treatment rendered, disposition and follow-up plan. Reporting abdominal pain diffuse in nature abdomen soft no peritoneal signs at this time. Associated nausea vomiting unreliable historian at this point on duration states it feels like when she has had poison in her stomach in the past.  Denies overdose      Critical Care     Nataliia Wooten M.D.   Attending Emergency  Physician             Edd Harkins MD  09/05/17 0000

## 2017-09-05 NOTE — ED PROVIDER NOTES
[address] ED  Emergency Department  Emergency Medicine Resident Sign-out     Care of Henrietta Aguilar was assumed from Dr. Krista Torres and is being seen for Abdominal Pain and Skin Ulcer  . The patient's initial evaluation and plan have been discussed with the prior provider who initially evaluated the patient. EMERGENCY DEPARTMENT COURSE / MEDICAL DECISION MAKING:       MEDICATIONS GIVEN:  Orders Placed This Encounter   Medications    ondansetron (ZOFRAN) injection 4 mg    0.9 % sodium chloride bolus       LABS / RADIOLOGY:     Labs Reviewed   CBC WITH AUTO DIFFERENTIAL - Abnormal; Notable for the following:        Result Value    RBC 3.80 (*)     All other components within normal limits   COMPREHENSIVE METABOLIC PANEL - Abnormal; Notable for the following:     Glucose 415 (*)     BUN 33 (*)     CREATININE 1.37 (*)     Chloride 96 (*)     Total Bilirubin 0.22 (*)     Total Protein 8.5 (*)     GFR Non- 41 (*)     GFR  49 (*)     All other components within normal limits   POC GLUCOSE FINGERSTICK - Abnormal; Notable for the following:     POC Glucose 268 (*)     All other components within normal limits   POCT GLUCOSE - Normal   LIPASE   HCG, SERUM, QUALITATIVE   URINALYSIS WITH MICROSCOPIC       No results found. RECENT VITALS:     Temp: 98.2 °F (36.8 °C),  Pulse: 102, Resp: 17, BP: 124/73, SpO2: 96 %    This patient is a 50 y.o. Female with Diffuse abdominal pain. Belly labs unremarkable. 519: UA showing 10-20 WBCs moderate bacteria but 20-50 epithelial cells negative nitrate negative leuks. 549: Patient states that she is now suicidal to nursing staff. Patient to be placed on a watch and evaluate by social work. 641: Patient to be admitted to [facility],    OUTSTANDING TASKS / RECOMMENDATIONS:    1. F/u UA     FINAL IMPRESSION:     1. Generalized abdominal pain    2. Skin ulcer, limited to breakdown of skin ([address]    3.  Suicidal ideation DISPOSITION:         DISPOSITION:  []  Discharge-   [x]  Transfer - [facility]   []  Admission -     []  Against Medical Advice   []  Eloped   FOLLOW-UP: PCP from clinic list    Schedule an appointment as soon as possible for a visit  As needed     DISCHARGE MEDICATIONS: New Prescriptions    No medications on file          Rima Dozier MD  Emergency Medicine Resident  Owen Maguire MD  Resident  09/05/17 9989       Attending Addendum:    Note reviewed and I agree with resident/SAVANAH documenation. Changes to chart made as appropriate.     Jani Sanders MD  Attending Emergency Physician  9:34 AM 9/5/2017    Jani Sanders MD  09/05/17 [address] MD Sami  09/05/17 0666

## 2017-09-05 NOTE — ED NOTES
Care assumed. Expresses suicidal/homicidal thoughts (without specific plan) on discharge for tx of UTI. [] [place]    [] [address]    [x]  [facility] ASSESSMENT      Y  N     [x] [] In the past two weeks have you had thoughts of hurting yourself in any way? [x] [] In the past two weeks have you had thoughts that you would be better off dead? [x] [] Have you made a suicide attempt in the past two months? [] [x] Do you have a plan for hurting yourself or suicide? [x] [] Presence of hallucinations/voices related to hurting himself or herself or someone else. SUICIDE/SECURITY WATCH PRECAUTION CHECKLIST     Orders    [x]  Suicide/Security Watch Precautions initiated as checked below:   9/5/17 5:51 AM 03/03HA    [x] Notified physician:  Julieta Barker MD  9/5/17 5:51 AM    [x] Orders obtained as appropriate:     [] 1:1 Observer     [] Psych Consult     [] Psych Consult    Name:  Date:  Time:    [x] 1:1 Observer, Notified by: Wan Mcdaniels Nurse Supervisor    [] Remove all personal clothes from room and place in snap/paper gown/pants. Slipper only    [] Remove all personal belongings from room and secured away from patient. Documentation    [x] Initiate Suicide/Security Watch Precaution Flow Sheet    [x] Initiate individualized Care Plan/Problem    [x] Document why precautions initiated on flow sheet (Initiate Nursing Care Plan/Problem)    [x] 1:1 Observer in place; instructions provided. Suicide precautions require observer be within arms length. [x] Nurse-Observer Communication Hand-off initiated by RN, reviewed with Observer. Subsequently used as Hand Off between Observers. [x] Initiate every 15 minute observations per observer as delegated by the RN.     [x] Initiate RN assessment and documentation    Environmental Scan  Search Criteria and Process: OPTIONAL, see Search Policy    [x] Reason for search: suicidal/homicidal thoughts. [x] Nursing in presence of second person to search patient    [x] Patient notified of reason for body assessment and belongings search:     Persons present during search:   Results of search and disposition:       Searchers Name: carmella/security  These items or items similar should be removed from the room:   [] Chairs   [] Telephone   [] Trash cans and liners   [] Plastic utensils (order Patient Safety tray)   [] Empty or remove Sharps containers   [] All personal clothing/belongings removed   [] All unnecessary lead wires, electrical cords, draw cords, etc.   [] Flowers and plants   [] Double check for lighters, matches, razors, any glass items etc that can be used as weapons. (in hallbed)    Person completing Checklist: Daniel Hardy       GENERAL INFORMATION     Y  N     [] [] Has the patient been informed that they are on a watch and what that means? [] [] Can the patient get out of Bed without nursing assistance? [] [] Can the patient use the restroom without nursing assistance? [] [] Can the patient walk the halls to [place] their legs? \"   [] [] Does the patient have metal utensils? [] [] Have the patient's belongings been placed out of control of the patient? [] [] Have the patient and his/her belongings been checked for contraband? [] [] Is the patient under any visitor restrictions? If Yes, explain:   [] [] Is the patient under an alias? [place] [address] Name:   Authorized visitors (no more than two are to be on the list)   Name/Relationship:   Name/Relationship:    Name of Staff member that you  Received this information from?: [address] [place] [address] Description:    Chandra Tranalba 03/03HA female 50 y.o. Admission weight:      Race: []  [x] Black  []   []   [] [place] [] Other  Facial Hair:  [] Yes  [] No  If yes, please describe: Identifying Marks (i.e. Visible tattoos, scars, etc... ):     NURSING CARE PLAN    Nursing Diagnosis: Risk of Self Directed Orlando Rogers, VINOD  09/05/17 6487

## 2017-09-05 NOTE — ED PROVIDER NOTES
[facility]  ED  Emergency Department Encounter  Emergency Medicine Resident     Pt Name: Vanessa Guerrero  MRN: [id number]  [place] 1965  Date of evaluation: 9/4/17  PCP:  No primary care provider on file. CHIEF COMPLAINT       Chief Complaint   Patient presents with    Abdominal Pain    Skin Ulcer       HISTORY OF PRESENT ILLNESS  (Location/Symptom, Timing/Onset, Context/Setting, Quality, Duration, Modifying Factors, Severity.)      Vanessa Guerrero is a 50 y.o. female with a significant psychiatric history who presents with Complaint of diffuse abdominal pain for the past few days. Patient states \"I feel like something is going to my abdomen\". Patient states she has felt this way for the past several years but feels as though it has gotten worse over the past 2 days. Patient states that pain is mainly in her lower abdomen and is an achy sensation. Patient states she is nauseous but denies any vomiting. Patient denies any change in her urinary habits, dysuria, increased frequency or difficulty urinating. Patient does states she had an episode of diarrhea yesterday but denies any diarrhea today. Patient denies any fevers or chills. Patient also states she has a history of diabetes and has \"diabetic ulcers\" on her thighs. Patient denies any drainage, pain or redness around the ulcers on her thighs. She does admit to occasional crack cocaine or marijuana use but denies any illicit drug use today. Patient denies any alcohol use today. PAST MEDICAL / SURGICAL / SOCIAL / FAMILY HISTORY      has a past medical history of Asthma; DDD (degenerative disc disease), cervical; Diabetes mellitus ([address] Fibromyalgia; Lupus ([address] Migraine; and Neuropathy ([address] has a past surgical history that includes Abscess Drainage; chest tube insertion; Abdomen surgery; Cataract removal with implant (Bilateral); and LASIK (Bilateral).     Social History     Social History    Marital status: (GLUCOPHAGE) 1000 MG tablet Take 1,000 mg by mouth 2 times daily (with meals)     Historical Provider, MD       REVIEW OF SYSTEMS    (2-9 systems for level 4, 10 or more for level 5)      Review of Systems   Constitutional: Negative for chills, diaphoresis and fever. HENT: Negative for congestion and sore throat. Respiratory: Negative for chest tightness and shortness of breath. Cardiovascular: Negative for chest pain. Gastrointestinal: Positive for abdominal pain and nausea. Negative for constipation, diarrhea and vomiting. Genitourinary: Negative for decreased urine volume, difficulty urinating, dysuria, frequency and urgency. Musculoskeletal: Negative for neck pain. Skin: Positive for wound. Negative for rash. Neurological: Negative for dizziness, weakness and numbness. PHYSICAL EXAM   (up to 7 for level 4, 8 or more for level 5)      INITIAL VITALS:   /73  Pulse 102  Temp 98.2 °F (36.8 °C) (Oral)   Resp 17  LMP  (LMP Unknown)  SpO2 96%    Physical Exam   Constitutional: She is oriented to person, place, and time. She appears well-developed and well-nourished. No distress. HENT:   Head: Normocephalic and atraumatic. Mouth/Throat: Oropharynx is clear and moist.   Eyes: Conjunctivae and EOM are normal. Pupils are equal, round, and reactive to light. Neck: Normal range of motion. Neck supple. Cardiovascular: Normal rate, regular rhythm, normal heart sounds and intact distal pulses. Pulmonary/Chest: Effort normal and breath sounds normal. No respiratory distress. She has no wheezes. She has no rales. Abdominal: Soft. Bowel sounds are normal. She exhibits no distension. There is tenderness (diffuse tenderness to palpation with slightly more tenderness in the right upper quadrant compared to the rest the abdomen, no rebound tenderness, no guarding, no peritoneal signs). There is no rebound. Musculoskeletal: Normal range of motion. She exhibits no edema or tenderness. Neurological: She is alert and oriented to person, place, and time. Skin: Skin is warm and dry. Lesion (1 2 cm healing lesion present on either anterior thigh with no surrounding erythema, fluctuance, drainage or tenderness) noted. She is not diaphoretic. Psychiatric:   Patient does seem to be responding some internal stimulus but patient denies any auditory visualizations, denies any suicidal or homicidal ideation   Nursing note and vitals reviewed.       DIFFERENTIAL  DIAGNOSIS     PLAN (LABS / IMAGING / EKG):  Orders Placed This Encounter   Procedures    CBC Auto Differential    Comprehensive Metabolic Panel    LIPASE    Urinalysis with microscopic    HCG, SERUM, QUALITATIVE    POCT Glucose    POC Glucose Fingerstick    Insert peripheral IV       MEDICATIONS ORDERED:  Orders Placed This Encounter   Medications    ondansetron (ZOFRAN) injection 4 mg    0.9 % sodium chloride bolus       DDX: GERD, PUD, pancreatitis, cholecystitis, GB colic, cholangitis, [id number]-[id number]-[id number], ACS/ MI, pneumonia, SBO, DKA, AAA, mesenteric ischemia, perforated viscous, acute gastroenteritis, NSAP, pyelonephritis, kidney stone, appendicitis, hernia, UTI, constipation, ectopic, ovarian torsion, ovarian cyst, PID, tuboovarian abscess, period/ fibroid    DIAGNOSTIC RESULTS / EMERGENCY DEPARTMENT COURSE / MDM     LABS:  Results for orders placed or performed during the hospital encounter of 09/04/17   CBC Auto Differential   Result Value Ref Range    WBC 8.9 3.5 - 11.0 k/uL    RBC 3.80 (L) 4.0 - 5.2 m/uL    Hemoglobin 12.1 12.0 - 16.0 g/dL    Hematocrit 36.3 36 - 46 %    MCV 95.7 80 - 100 fL    MCH 31.8 26 - 34 pg    MCHC 33.2 31 - 37 g/dL    RDW 13.0 12.5 - 15.4 %    Platelets 157 728 - 224 k/uL    MPV 9.0 6.0 - 12.0 fL    Differential Type NOT REPORTED     WBC Morphology NOT REPORTED     RBC Morphology NOT REPORTED     Platelet Estimate NOT REPORTED     Seg Neutrophils 76 %    Lymphocytes 19 %    Monocytes 3 %    Eosinophils % 2 %    Basophils 0 %    Segs Absolute 6.76 1.8 - 7.7 k/uL    Absolute Lymph # 1.69 1.0 - 4.8 k/uL    Absolute Mono # 0.27 0.1 - 0.8 k/uL    Absolute Eos # 0.18 0.0 - 0.4 k/uL    Basophils # 0.00 0.0 - 0.2 k/uL    Morphology Normal    Comprehensive Metabolic Panel   Result Value Ref Range    Glucose 415 (HH) 70 - 99 mg/dL    BUN 33 (H) 6 - 20 mg/dL    CREATININE 1.37 (H) 0.50 - 0.90 mg/dL    Bun/Cre Ratio NOT REPORTED 9 - 20    Calcium 9.7 8.6 - 10.4 mg/dL    Sodium 135 135 - 144 mmol/L    Potassium 4.2 3.7 - 5.3 mmol/L    Chloride 96 (L) 98 - 107 mmol/L    CO2 23 20 - 31 mmol/L    Anion Gap 16 9 - 17 mmol/L    Alkaline Phosphatase 87 35 - 104 U/L    ALT 14 5 - 33 U/L    AST 17 <32 U/L    Total Bilirubin 0.22 (L) 0.3 - 1.2 mg/dL    Total Protein 8.5 (H) 6.4 - 8.3 g/dL    Alb 4.4 3.5 - 5.2 g/dL    Albumin/Globulin Ratio 1.1 1.0 - 2.5    GFR Non- 41 (L) >60 mL/min    GFR  49 (L) >60 mL/min    GFR Comment          GFR Staging NOT REPORTED    LIPASE   Result Value Ref Range    Lipase 21 13 - 60 U/L   HCG, SERUM, QUALITATIVE   Result Value Ref Range    hCG Qual NEGATIVE NEG   POCT Glucose   Result Value Ref Range    Glucose 268 mg/dL    QC OK? yes    POC Glucose Fingerstick   Result Value Ref Range    POC Glucose 268 (H) 65 - 105 mg/dL       IMPRESSION: 43-year-old female presenting with chief complaint of diffuse abdominal tenderness the past few days. Patient denies any nausea, vomiting, diarrhea. No change in urinary habits. On exam patient does have some mild tenderness to palpation of the abdomen with no rebound tenderness or guarding. Patient does have a significant psychiatric history and is pending emergency room with similar complaints in the past with negative workup. Patient states \"something is going to my abdomen\". My suspicion is that this patient has psychogenic cause of her abdominal pain although patient will need an abdominal workup today.     Plan is for CBC, CMP, lipase, urinalysis, pregnancy testing, anti-medics and IV fluids. RADIOLOGY:  None    EKG  None    All EKG's are interpreted by the Emergency Department Physician who either signs or Co-signs this chart in the absence of a cardiologist.    EMERGENCY DEPARTMENT COURSE:  ED Course   Patient's blood work did show hyperglycemia of 400. Patient will be given IV fluids and recheck her glucose. If her glucose is controlled emergency room likely discharge with outpatient follow-up to primary care physician. Still awaiting urinalysis at this time. 4:31 AM  Patient abdominal workup unremarkable. Awaiting urinalysis results at this time. PROCEDURES:  None    CONSULTS:  None    CRITICAL CARE:  None    FINAL IMPRESSION      1. Generalized abdominal pain    2.  Skin ulcer, limited to breakdown of skin ([address]          DISPOSITION / PLAN     DISPOSITION     Sign out given to Dr. Pal Prow:  PCP from clinic list    Schedule an appointment as soon as possible for a visit  As needed      DISCHARGE MEDICATIONS:  New Prescriptions    No medications on file       Sorin Bray MD  Emergency Medicine Resident    (Please note that portions of this note were completed with a voice recognition program.  Efforts were made to edit the dictations but occasionally words are mis-transcribed.)      Sorin Bray MD  Resident  09/05/17 5632

## 2017-09-05 NOTE — ED NOTES
Report received from Héctor Posada. Pt resting on cot, nad, rr even and unlabored. Guard remains at bedside.  Awaiting bed placement            Abdoulaye Gay RN  09/05/17 2869

## 2017-09-06 LAB
GLUCOSE BLD-MCNC: 178 MG/DL (ref 65–105)
GLUCOSE BLD-MCNC: 296 MG/DL (ref 65–105)

## 2017-09-06 PROCEDURE — 82947 ASSAY GLUCOSE BLOOD QUANT: CPT

## 2017-09-06 PROCEDURE — 6370000000 HC RX 637 (ALT 250 FOR IP): Performed by: PSYCHIATRY & NEUROLOGY

## 2017-09-06 PROCEDURE — 99253 IP/OBS CNSLTJ NEW/EST LOW 45: CPT | Performed by: INTERNAL MEDICINE

## 2017-09-06 PROCEDURE — 1240000000 HC EMOTIONAL WELLNESS R&B

## 2017-09-06 PROCEDURE — 6370000000 HC RX 637 (ALT 250 FOR IP): Performed by: INTERNAL MEDICINE

## 2017-09-06 RX ORDER — NICOTINE POLACRILEX 4 MG
15 LOZENGE BUCCAL PRN
Status: DISCONTINUED | OUTPATIENT
Start: 2017-09-06 | End: 2017-09-12 | Stop reason: HOSPADM

## 2017-09-06 RX ORDER — MAGNESIUM HYDROXIDE/ALUMINUM HYDROXICE/SIMETHICONE 120; 1200; 1200 MG/30ML; MG/30ML; MG/30ML
20 SUSPENSION ORAL 2 TIMES DAILY PRN
Status: DISCONTINUED | OUTPATIENT
Start: 2017-09-06 | End: 2017-09-12 | Stop reason: HOSPADM

## 2017-09-06 RX ORDER — INSULIN GLARGINE 100 [IU]/ML
12 INJECTION, SOLUTION SUBCUTANEOUS NIGHTLY
Status: DISCONTINUED | OUTPATIENT
Start: 2017-09-06 | End: 2017-09-12 | Stop reason: HOSPADM

## 2017-09-06 RX ORDER — DEXTROSE MONOHYDRATE 25 G/50ML
12.5 INJECTION, SOLUTION INTRAVENOUS PRN
Status: DISCONTINUED | OUTPATIENT
Start: 2017-09-06 | End: 2017-09-12 | Stop reason: HOSPADM

## 2017-09-06 RX ORDER — DEXTROSE MONOHYDRATE 50 MG/ML
100 INJECTION, SOLUTION INTRAVENOUS PRN
Status: DISCONTINUED | OUTPATIENT
Start: 2017-09-06 | End: 2017-09-12 | Stop reason: HOSPADM

## 2017-09-06 RX ADMIN — TRAZODONE HYDROCHLORIDE 150 MG: 150 TABLET ORAL at 21:34

## 2017-09-06 RX ADMIN — METFORMIN HYDROCHLORIDE 1000 MG: 500 TABLET, FILM COATED ORAL at 08:49

## 2017-09-06 RX ADMIN — PRAZOSIN HYDROCHLORIDE 1 MG: 1 CAPSULE ORAL at 21:33

## 2017-09-06 RX ADMIN — PIOGLITAZONE 30 MG: 15 TABLET ORAL at 08:48

## 2017-09-06 RX ADMIN — INSULIN GLARGINE 12 UNITS: 100 INJECTION, SOLUTION SUBCUTANEOUS at 22:23

## 2017-09-06 RX ADMIN — METFORMIN HYDROCHLORIDE 1000 MG: 500 TABLET, FILM COATED ORAL at 17:49

## 2017-09-06 RX ADMIN — GABAPENTIN 900 MG: 300 CAPSULE ORAL at 21:33

## 2017-09-06 RX ADMIN — NAPROXEN 500 MG: 500 TABLET ORAL at 17:49

## 2017-09-06 RX ADMIN — GABAPENTIN 900 MG: 300 CAPSULE ORAL at 14:06

## 2017-09-06 RX ADMIN — ARIPIPRAZOLE 10 MG: 10 TABLET ORAL at 08:48

## 2017-09-06 RX ADMIN — NAPROXEN 500 MG: 500 TABLET ORAL at 08:48

## 2017-09-06 RX ADMIN — TRAMADOL HYDROCHLORIDE 50 MG: 50 TABLET, FILM COATED ORAL at 21:33

## 2017-09-06 RX ADMIN — CITALOPRAM HYDROBROMIDE 40 MG: 40 TABLET ORAL at 08:49

## 2017-09-06 RX ADMIN — GABAPENTIN 900 MG: 300 CAPSULE ORAL at 08:49

## 2017-09-06 ASSESSMENT — PAIN SCALES - GENERAL
PAINLEVEL_OUTOF10: 4
PAINLEVEL_OUTOF10: 4
PAINLEVEL_OUTOF10: 5
PAINLEVEL_OUTOF10: 3

## 2017-09-06 ASSESSMENT — PAIN DESCRIPTION - LOCATION
LOCATION: GENERALIZED
LOCATION: GENERALIZED

## 2017-09-07 PROBLEM — K21.9 ACID REFLUX: Status: ACTIVE | Noted: 2017-05-29

## 2017-09-07 PROBLEM — M19.90 ARTHRITIS: Status: ACTIVE | Noted: 2017-05-29

## 2017-09-07 PROBLEM — I63.9 CEREBROVASCULAR ACCIDENT (CVA) (HCC): Status: ACTIVE | Noted: 2017-06-14

## 2017-09-07 LAB
GLUCOSE BLD-MCNC: 140 MG/DL (ref 65–105)
GLUCOSE BLD-MCNC: 229 MG/DL (ref 65–105)
GLUCOSE BLD-MCNC: 255 MG/DL (ref 65–105)

## 2017-09-07 PROCEDURE — 6370000000 HC RX 637 (ALT 250 FOR IP): Performed by: INTERNAL MEDICINE

## 2017-09-07 PROCEDURE — 99232 SBSQ HOSP IP/OBS MODERATE 35: CPT | Performed by: INTERNAL MEDICINE

## 2017-09-07 PROCEDURE — 6370000000 HC RX 637 (ALT 250 FOR IP): Performed by: PSYCHIATRY & NEUROLOGY

## 2017-09-07 PROCEDURE — 1240000000 HC EMOTIONAL WELLNESS R&B

## 2017-09-07 PROCEDURE — 82947 ASSAY GLUCOSE BLOOD QUANT: CPT

## 2017-09-07 RX ADMIN — METFORMIN HYDROCHLORIDE 1000 MG: 500 TABLET, FILM COATED ORAL at 17:43

## 2017-09-07 RX ADMIN — TRAZODONE HYDROCHLORIDE 150 MG: 150 TABLET ORAL at 21:54

## 2017-09-07 RX ADMIN — GABAPENTIN 900 MG: 300 CAPSULE ORAL at 14:43

## 2017-09-07 RX ADMIN — GABAPENTIN 900 MG: 300 CAPSULE ORAL at 21:54

## 2017-09-07 RX ADMIN — GABAPENTIN 900 MG: 300 CAPSULE ORAL at 08:30

## 2017-09-07 RX ADMIN — TRAMADOL HYDROCHLORIDE 50 MG: 50 TABLET, FILM COATED ORAL at 14:50

## 2017-09-07 RX ADMIN — CITALOPRAM HYDROBROMIDE 40 MG: 40 TABLET ORAL at 08:30

## 2017-09-07 RX ADMIN — METFORMIN HYDROCHLORIDE 1000 MG: 500 TABLET, FILM COATED ORAL at 08:30

## 2017-09-07 RX ADMIN — PIOGLITAZONE 30 MG: 15 TABLET ORAL at 08:55

## 2017-09-07 RX ADMIN — INSULIN GLARGINE 12 UNITS: 100 INJECTION, SOLUTION SUBCUTANEOUS at 21:55

## 2017-09-07 RX ADMIN — ARIPIPRAZOLE 10 MG: 10 TABLET ORAL at 08:30

## 2017-09-07 ASSESSMENT — PAIN SCALES - GENERAL
PAINLEVEL_OUTOF10: 5
PAINLEVEL_OUTOF10: 3

## 2017-09-08 LAB
GLUCOSE BLD-MCNC: 125 MG/DL (ref 65–105)
GLUCOSE BLD-MCNC: 138 MG/DL (ref 65–105)
GLUCOSE BLD-MCNC: 146 MG/DL (ref 65–105)
GLUCOSE BLD-MCNC: 148 MG/DL (ref 65–105)
GLUCOSE BLD-MCNC: 171 MG/DL (ref 65–105)

## 2017-09-08 PROCEDURE — 1240000000 HC EMOTIONAL WELLNESS R&B

## 2017-09-08 PROCEDURE — 6370000000 HC RX 637 (ALT 250 FOR IP): Performed by: PSYCHIATRY & NEUROLOGY

## 2017-09-08 PROCEDURE — 99231 SBSQ HOSP IP/OBS SF/LOW 25: CPT | Performed by: INTERNAL MEDICINE

## 2017-09-08 PROCEDURE — 6370000000 HC RX 637 (ALT 250 FOR IP): Performed by: INTERNAL MEDICINE

## 2017-09-08 PROCEDURE — 82947 ASSAY GLUCOSE BLOOD QUANT: CPT

## 2017-09-08 RX ADMIN — GABAPENTIN 900 MG: 300 CAPSULE ORAL at 22:33

## 2017-09-08 RX ADMIN — GABAPENTIN 900 MG: 300 CAPSULE ORAL at 14:21

## 2017-09-08 RX ADMIN — TRAMADOL HYDROCHLORIDE 50 MG: 50 TABLET, FILM COATED ORAL at 08:52

## 2017-09-08 RX ADMIN — CITALOPRAM HYDROBROMIDE 40 MG: 40 TABLET ORAL at 08:23

## 2017-09-08 RX ADMIN — INSULIN GLARGINE 12 UNITS: 100 INJECTION, SOLUTION SUBCUTANEOUS at 22:38

## 2017-09-08 RX ADMIN — TRAZODONE HYDROCHLORIDE 150 MG: 150 TABLET ORAL at 22:33

## 2017-09-08 RX ADMIN — GABAPENTIN 900 MG: 300 CAPSULE ORAL at 08:23

## 2017-09-08 RX ADMIN — PIOGLITAZONE 30 MG: 15 TABLET ORAL at 08:23

## 2017-09-08 RX ADMIN — METFORMIN HYDROCHLORIDE 1000 MG: 500 TABLET, FILM COATED ORAL at 08:23

## 2017-09-08 RX ADMIN — METFORMIN HYDROCHLORIDE 1000 MG: 500 TABLET, FILM COATED ORAL at 17:24

## 2017-09-08 RX ADMIN — ARIPIPRAZOLE 10 MG: 10 TABLET ORAL at 08:23

## 2017-09-08 ASSESSMENT — PAIN SCALES - GENERAL
PAINLEVEL_OUTOF10: 10
PAINLEVEL_OUTOF10: 3

## 2017-09-08 ASSESSMENT — PAIN DESCRIPTION - PAIN TYPE
TYPE: CHRONIC PAIN

## 2017-09-08 ASSESSMENT — PAIN DESCRIPTION - LOCATION
LOCATION: GENERALIZED

## 2017-09-09 PROBLEM — Z86.73 HISTORY OF STROKE: Status: ACTIVE | Noted: 2017-09-09

## 2017-09-09 LAB
GLUCOSE BLD-MCNC: 119 MG/DL (ref 65–105)
GLUCOSE BLD-MCNC: 122 MG/DL (ref 65–105)
GLUCOSE BLD-MCNC: 128 MG/DL (ref 65–105)
GLUCOSE BLD-MCNC: 142 MG/DL (ref 65–105)

## 2017-09-09 PROCEDURE — 6370000000 HC RX 637 (ALT 250 FOR IP): Performed by: PSYCHIATRY & NEUROLOGY

## 2017-09-09 PROCEDURE — 6370000000 HC RX 637 (ALT 250 FOR IP): Performed by: INTERNAL MEDICINE

## 2017-09-09 PROCEDURE — 1240000000 HC EMOTIONAL WELLNESS R&B

## 2017-09-09 PROCEDURE — 82947 ASSAY GLUCOSE BLOOD QUANT: CPT

## 2017-09-09 PROCEDURE — 99232 SBSQ HOSP IP/OBS MODERATE 35: CPT | Performed by: INTERNAL MEDICINE

## 2017-09-09 RX ORDER — FLUCONAZOLE 100 MG/1
200 TABLET ORAL ONCE
Status: COMPLETED | OUTPATIENT
Start: 2017-09-09 | End: 2017-09-09

## 2017-09-09 RX ADMIN — INSULIN GLARGINE 12 UNITS: 100 INJECTION, SOLUTION SUBCUTANEOUS at 21:30

## 2017-09-09 RX ADMIN — ALUMINUM HYDROXIDE, MAGNESIUM HYDROXIDE, AND SIMETHICONE 20 ML: 200; 200; 20 SUSPENSION ORAL at 10:21

## 2017-09-09 RX ADMIN — TRAZODONE HYDROCHLORIDE 150 MG: 150 TABLET ORAL at 21:30

## 2017-09-09 RX ADMIN — GABAPENTIN 900 MG: 300 CAPSULE ORAL at 08:14

## 2017-09-09 RX ADMIN — GABAPENTIN 900 MG: 300 CAPSULE ORAL at 21:30

## 2017-09-09 RX ADMIN — ARIPIPRAZOLE 10 MG: 10 TABLET ORAL at 08:14

## 2017-09-09 RX ADMIN — CITALOPRAM HYDROBROMIDE 40 MG: 40 TABLET ORAL at 08:14

## 2017-09-09 RX ADMIN — METFORMIN HYDROCHLORIDE 1000 MG: 500 TABLET, FILM COATED ORAL at 17:50

## 2017-09-09 RX ADMIN — METFORMIN HYDROCHLORIDE 1000 MG: 500 TABLET, FILM COATED ORAL at 08:14

## 2017-09-09 RX ADMIN — GABAPENTIN 900 MG: 300 CAPSULE ORAL at 14:57

## 2017-09-09 RX ADMIN — PIOGLITAZONE 30 MG: 15 TABLET ORAL at 08:14

## 2017-09-09 RX ADMIN — TRAMADOL HYDROCHLORIDE 50 MG: 50 TABLET, FILM COATED ORAL at 08:32

## 2017-09-09 RX ADMIN — FLUCONAZOLE 200 MG: 100 TABLET ORAL at 10:18

## 2017-09-09 ASSESSMENT — PAIN SCALES - GENERAL
PAINLEVEL_OUTOF10: 5
PAINLEVEL_OUTOF10: 3
PAINLEVEL_OUTOF10: 10

## 2017-09-09 ASSESSMENT — PAIN DESCRIPTION - LOCATION: LOCATION: GENERALIZED

## 2017-09-09 ASSESSMENT — ENCOUNTER SYMPTOMS
SHORTNESS OF BREATH: 0
ABDOMINAL PAIN: 0

## 2017-09-10 LAB
ANION GAP SERPL CALCULATED.3IONS-SCNC: 12 MMOL/L (ref 9–17)
BUN BLDV-MCNC: 11 MG/DL (ref 6–20)
BUN/CREAT BLD: NORMAL (ref 9–20)
CALCIUM SERPL-MCNC: 9.4 MG/DL (ref 8.6–10.4)
CHLORIDE BLD-SCNC: 100 MMOL/L (ref 98–107)
CO2: 27 MMOL/L (ref 20–31)
CREAT SERPL-MCNC: 0.71 MG/DL (ref 0.5–0.9)
GFR AFRICAN AMERICAN: >60 ML/MIN
GFR NON-AFRICAN AMERICAN: >60 ML/MIN
GFR SERPL CREATININE-BSD FRML MDRD: NORMAL ML/MIN/{1.73_M2}
GFR SERPL CREATININE-BSD FRML MDRD: NORMAL ML/MIN/{1.73_M2}
GLUCOSE BLD-MCNC: 118 MG/DL (ref 65–105)
GLUCOSE BLD-MCNC: 152 MG/DL (ref 65–105)
GLUCOSE BLD-MCNC: 87 MG/DL (ref 65–105)
GLUCOSE BLD-MCNC: 90 MG/DL (ref 70–99)
GLUCOSE BLD-MCNC: 91 MG/DL (ref 65–105)
POTASSIUM SERPL-SCNC: 4.7 MMOL/L (ref 3.7–5.3)
SODIUM BLD-SCNC: 139 MMOL/L (ref 135–144)

## 2017-09-10 PROCEDURE — 80048 BASIC METABOLIC PNL TOTAL CA: CPT

## 2017-09-10 PROCEDURE — 82947 ASSAY GLUCOSE BLOOD QUANT: CPT

## 2017-09-10 PROCEDURE — 36415 COLL VENOUS BLD VENIPUNCTURE: CPT

## 2017-09-10 PROCEDURE — 86038 ANTINUCLEAR ANTIBODIES: CPT

## 2017-09-10 PROCEDURE — 1240000000 HC EMOTIONAL WELLNESS R&B

## 2017-09-10 PROCEDURE — 6370000000 HC RX 637 (ALT 250 FOR IP): Performed by: PSYCHIATRY & NEUROLOGY

## 2017-09-10 PROCEDURE — 99232 SBSQ HOSP IP/OBS MODERATE 35: CPT | Performed by: INTERNAL MEDICINE

## 2017-09-10 PROCEDURE — 6370000000 HC RX 637 (ALT 250 FOR IP): Performed by: INTERNAL MEDICINE

## 2017-09-10 RX ADMIN — GABAPENTIN 900 MG: 300 CAPSULE ORAL at 20:50

## 2017-09-10 RX ADMIN — GABAPENTIN 900 MG: 300 CAPSULE ORAL at 08:34

## 2017-09-10 RX ADMIN — GABAPENTIN 900 MG: 300 CAPSULE ORAL at 14:37

## 2017-09-10 RX ADMIN — TRAZODONE HYDROCHLORIDE 150 MG: 150 TABLET ORAL at 20:50

## 2017-09-10 RX ADMIN — METFORMIN HYDROCHLORIDE 1000 MG: 500 TABLET, FILM COATED ORAL at 08:34

## 2017-09-10 RX ADMIN — PIOGLITAZONE 30 MG: 15 TABLET ORAL at 08:35

## 2017-09-10 RX ADMIN — ARIPIPRAZOLE 10 MG: 10 TABLET ORAL at 08:34

## 2017-09-10 RX ADMIN — CITALOPRAM HYDROBROMIDE 40 MG: 40 TABLET ORAL at 08:35

## 2017-09-10 RX ADMIN — INSULIN GLARGINE 12 UNITS: 100 INJECTION, SOLUTION SUBCUTANEOUS at 20:49

## 2017-09-10 RX ADMIN — METFORMIN HYDROCHLORIDE 1000 MG: 500 TABLET, FILM COATED ORAL at 17:12

## 2017-09-10 RX ADMIN — TRAMADOL HYDROCHLORIDE 50 MG: 50 TABLET, FILM COATED ORAL at 20:50

## 2017-09-10 RX ADMIN — TRAMADOL HYDROCHLORIDE 50 MG: 50 TABLET, FILM COATED ORAL at 08:35

## 2017-09-10 ASSESSMENT — PAIN SCALES - GENERAL
PAINLEVEL_OUTOF10: 3
PAINLEVEL_OUTOF10: 4
PAINLEVEL_OUTOF10: 7
PAINLEVEL_OUTOF10: 8

## 2017-09-10 ASSESSMENT — ENCOUNTER SYMPTOMS
ABDOMINAL PAIN: 0
SHORTNESS OF BREATH: 0

## 2017-09-11 LAB
ANION GAP SERPL CALCULATED.3IONS-SCNC: 13 MMOL/L (ref 9–17)
ANTI-NUCLEAR ANTIBODY (ANA): NEGATIVE
BUN BLDV-MCNC: 13 MG/DL (ref 6–20)
BUN/CREAT BLD: ABNORMAL (ref 9–20)
CALCIUM SERPL-MCNC: 9.2 MG/DL (ref 8.6–10.4)
CHLORIDE BLD-SCNC: 100 MMOL/L (ref 98–107)
CO2: 26 MMOL/L (ref 20–31)
CREAT SERPL-MCNC: 0.74 MG/DL (ref 0.5–0.9)
GFR AFRICAN AMERICAN: >60 ML/MIN
GFR NON-AFRICAN AMERICAN: >60 ML/MIN
GFR SERPL CREATININE-BSD FRML MDRD: ABNORMAL ML/MIN/{1.73_M2}
GFR SERPL CREATININE-BSD FRML MDRD: ABNORMAL ML/MIN/{1.73_M2}
GLUCOSE BLD-MCNC: 134 MG/DL (ref 65–105)
GLUCOSE BLD-MCNC: 136 MG/DL (ref 65–105)
GLUCOSE BLD-MCNC: 68 MG/DL (ref 65–105)
GLUCOSE BLD-MCNC: 69 MG/DL (ref 70–99)
POTASSIUM SERPL-SCNC: 4.6 MMOL/L (ref 3.7–5.3)
SODIUM BLD-SCNC: 139 MMOL/L (ref 135–144)

## 2017-09-11 PROCEDURE — 80048 BASIC METABOLIC PNL TOTAL CA: CPT

## 2017-09-11 PROCEDURE — 1240000000 HC EMOTIONAL WELLNESS R&B

## 2017-09-11 PROCEDURE — 36415 COLL VENOUS BLD VENIPUNCTURE: CPT

## 2017-09-11 PROCEDURE — 6370000000 HC RX 637 (ALT 250 FOR IP): Performed by: PSYCHIATRY & NEUROLOGY

## 2017-09-11 PROCEDURE — 6370000000 HC RX 637 (ALT 250 FOR IP): Performed by: INTERNAL MEDICINE

## 2017-09-11 PROCEDURE — 82947 ASSAY GLUCOSE BLOOD QUANT: CPT

## 2017-09-11 RX ADMIN — GABAPENTIN 900 MG: 300 CAPSULE ORAL at 14:37

## 2017-09-11 RX ADMIN — GABAPENTIN 900 MG: 300 CAPSULE ORAL at 08:42

## 2017-09-11 RX ADMIN — METFORMIN HYDROCHLORIDE 1000 MG: 500 TABLET, FILM COATED ORAL at 18:26

## 2017-09-11 RX ADMIN — GABAPENTIN 900 MG: 300 CAPSULE ORAL at 21:25

## 2017-09-11 RX ADMIN — PIOGLITAZONE 30 MG: 15 TABLET ORAL at 08:41

## 2017-09-11 RX ADMIN — CITALOPRAM HYDROBROMIDE 40 MG: 40 TABLET ORAL at 08:42

## 2017-09-11 RX ADMIN — METFORMIN HYDROCHLORIDE 1000 MG: 500 TABLET, FILM COATED ORAL at 08:41

## 2017-09-11 RX ADMIN — TRAMADOL HYDROCHLORIDE 50 MG: 50 TABLET, FILM COATED ORAL at 19:10

## 2017-09-11 RX ADMIN — TRAZODONE HYDROCHLORIDE 150 MG: 150 TABLET ORAL at 21:25

## 2017-09-11 RX ADMIN — INSULIN GLARGINE 12 UNITS: 100 INJECTION, SOLUTION SUBCUTANEOUS at 21:26

## 2017-09-11 RX ADMIN — ARIPIPRAZOLE 10 MG: 10 TABLET ORAL at 08:41

## 2017-09-11 RX ADMIN — TRAMADOL HYDROCHLORIDE 50 MG: 50 TABLET, FILM COATED ORAL at 08:41

## 2017-09-11 ASSESSMENT — PAIN DESCRIPTION - LOCATION
LOCATION: BUTTOCKS;LEG
LOCATION: GENERALIZED

## 2017-09-11 ASSESSMENT — PAIN SCALES - GENERAL
PAINLEVEL_OUTOF10: 4
PAINLEVEL_OUTOF10: 10

## 2017-09-11 ASSESSMENT — PAIN DESCRIPTION - PAIN TYPE: TYPE: CHRONIC PAIN

## 2017-09-12 VITALS
TEMPERATURE: 97.3 F | WEIGHT: 207 LBS | SYSTOLIC BLOOD PRESSURE: 127 MMHG | RESPIRATION RATE: 14 BRPM | DIASTOLIC BLOOD PRESSURE: 74 MMHG | BODY MASS INDEX: 34.49 KG/M2 | HEIGHT: 65 IN | HEART RATE: 80 BPM

## 2017-09-12 LAB
ANION GAP SERPL CALCULATED.3IONS-SCNC: 10 MMOL/L (ref 9–17)
BUN BLDV-MCNC: 13 MG/DL (ref 6–20)
BUN/CREAT BLD: NORMAL (ref 9–20)
CALCIUM SERPL-MCNC: 9.5 MG/DL (ref 8.6–10.4)
CHLORIDE BLD-SCNC: 101 MMOL/L (ref 98–107)
CO2: 28 MMOL/L (ref 20–31)
CREAT SERPL-MCNC: 0.78 MG/DL (ref 0.5–0.9)
GFR AFRICAN AMERICAN: >60 ML/MIN
GFR NON-AFRICAN AMERICAN: >60 ML/MIN
GFR SERPL CREATININE-BSD FRML MDRD: NORMAL ML/MIN/{1.73_M2}
GFR SERPL CREATININE-BSD FRML MDRD: NORMAL ML/MIN/{1.73_M2}
GLUCOSE BLD-MCNC: 88 MG/DL (ref 65–105)
GLUCOSE BLD-MCNC: 89 MG/DL (ref 70–99)
GLUCOSE BLD-MCNC: 96 MG/DL (ref 65–105)
POTASSIUM SERPL-SCNC: 5.1 MMOL/L (ref 3.7–5.3)
SODIUM BLD-SCNC: 139 MMOL/L (ref 135–144)

## 2017-09-12 PROCEDURE — 82947 ASSAY GLUCOSE BLOOD QUANT: CPT

## 2017-09-12 PROCEDURE — 5130000000 HC BRIDGE APPOINTMENT

## 2017-09-12 PROCEDURE — 80048 BASIC METABOLIC PNL TOTAL CA: CPT

## 2017-09-12 PROCEDURE — 36415 COLL VENOUS BLD VENIPUNCTURE: CPT

## 2017-09-12 PROCEDURE — 6370000000 HC RX 637 (ALT 250 FOR IP): Performed by: PSYCHIATRY & NEUROLOGY

## 2017-09-12 RX ORDER — INSULIN GLARGINE 100 [IU]/ML
12 INJECTION, SOLUTION SUBCUTANEOUS NIGHTLY
Qty: 1 VIAL | Refills: 0 | Status: ON HOLD | OUTPATIENT
Start: 2017-09-12 | End: 2018-01-06 | Stop reason: HOSPADM

## 2017-09-12 RX ORDER — TRAZODONE HYDROCHLORIDE 150 MG/1
150 TABLET ORAL NIGHTLY
Status: DISCONTINUED | OUTPATIENT
Start: 2017-09-12 | End: 2017-09-12 | Stop reason: HOSPADM

## 2017-09-12 RX ORDER — GABAPENTIN 300 MG/1
900 CAPSULE ORAL 3 TIMES DAILY
Qty: 126 CAPSULE | Refills: 0 | Status: ON HOLD | OUTPATIENT
Start: 2017-09-12 | End: 2018-01-06 | Stop reason: HOSPADM

## 2017-09-12 RX ORDER — PIOGLITAZONEHYDROCHLORIDE 30 MG/1
30 TABLET ORAL DAILY
Qty: 30 TABLET | Refills: 0 | Status: ON HOLD | OUTPATIENT
Start: 2017-09-12 | End: 2018-01-06 | Stop reason: HOSPADM

## 2017-09-12 RX ORDER — CITALOPRAM 40 MG/1
40 TABLET ORAL DAILY
Qty: 30 TABLET | Refills: 0 | Status: ON HOLD | OUTPATIENT
Start: 2017-09-12 | End: 2021-02-09 | Stop reason: HOSPADM

## 2017-09-12 RX ORDER — HYDROXYZINE 50 MG/1
50 TABLET, FILM COATED ORAL 3 TIMES DAILY PRN
Qty: 30 TABLET | Refills: 0 | Status: ON HOLD | OUTPATIENT
Start: 2017-09-12 | End: 2018-01-06 | Stop reason: HOSPADM

## 2017-09-12 RX ORDER — ARIPIPRAZOLE 10 MG/1
10 TABLET ORAL DAILY
Qty: 30 TABLET | Refills: 0 | Status: SHIPPED | OUTPATIENT
Start: 2017-09-12 | End: 2020-10-20 | Stop reason: SDUPTHER

## 2017-09-12 RX ORDER — TRAZODONE HYDROCHLORIDE 150 MG/1
150 TABLET ORAL NIGHTLY
Qty: 14 TABLET | Refills: 1 | Status: SHIPPED | OUTPATIENT
Start: 2017-09-12 | End: 2020-11-18 | Stop reason: SDUPTHER

## 2017-09-12 RX ADMIN — CITALOPRAM HYDROBROMIDE 40 MG: 40 TABLET ORAL at 09:21

## 2017-09-12 RX ADMIN — METFORMIN HYDROCHLORIDE 1000 MG: 500 TABLET, FILM COATED ORAL at 16:57

## 2017-09-12 RX ADMIN — PIOGLITAZONE 30 MG: 15 TABLET ORAL at 09:21

## 2017-09-12 RX ADMIN — TRAMADOL HYDROCHLORIDE 50 MG: 50 TABLET, FILM COATED ORAL at 09:21

## 2017-09-12 RX ADMIN — ARIPIPRAZOLE 10 MG: 10 TABLET ORAL at 09:21

## 2017-09-12 RX ADMIN — GABAPENTIN 900 MG: 300 CAPSULE ORAL at 15:25

## 2017-09-12 RX ADMIN — METFORMIN HYDROCHLORIDE 1000 MG: 500 TABLET, FILM COATED ORAL at 09:21

## 2017-09-12 RX ADMIN — GABAPENTIN 900 MG: 300 CAPSULE ORAL at 09:21

## 2017-09-12 ASSESSMENT — PAIN SCALES - GENERAL
PAINLEVEL_OUTOF10: 6
PAINLEVEL_OUTOF10: 2

## 2017-09-17 PROBLEM — F19.10 POLYSUBSTANCE ABUSE (HCC): Status: ACTIVE | Noted: 2017-09-17

## 2017-10-21 ENCOUNTER — HOSPITAL ENCOUNTER (EMERGENCY)
Age: 52
Discharge: ANOTHER HOSPITAL | End: 2017-10-21
Attending: EMERGENCY MEDICINE
Payer: MEDICAID

## 2017-10-21 ENCOUNTER — APPOINTMENT (OUTPATIENT)
Dept: CT IMAGING | Age: 52
End: 2017-10-21
Payer: MEDICAID

## 2017-10-21 VITALS
HEART RATE: 90 BPM | DIASTOLIC BLOOD PRESSURE: 90 MMHG | SYSTOLIC BLOOD PRESSURE: 154 MMHG | OXYGEN SATURATION: 100 % | TEMPERATURE: 98.6 F | RESPIRATION RATE: 18 BRPM

## 2017-10-21 DIAGNOSIS — M79.10 MYALGIA: ICD-10-CM
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
## 2017-10-21 DIAGNOSIS — M54.2 NECK PAIN: ICD-10-CM

## 2017-10-21 DIAGNOSIS — M54.6 ACUTE BILATERAL THORACIC BACK PAIN: ICD-10-CM

## 2017-10-21 DIAGNOSIS — S16.1XXA STRAIN OF NECK MUSCLE, INITIAL ENCOUNTER: ICD-10-CM

## 2017-10-21 PROCEDURE — 6370000000 HC RX 637 (ALT 250 FOR IP): Performed by: EMERGENCY MEDICINE

## 2017-10-21 PROCEDURE — 72125 CT NECK SPINE W/O DYE: CPT

## 2017-10-21 PROCEDURE — 72128 CT CHEST SPINE W/O DYE: CPT

## 2017-10-21 PROCEDURE — 99285 EMERGENCY DEPT VISIT HI MDM: CPT

## 2017-10-21 PROCEDURE — 70450 CT HEAD/BRAIN W/O DYE: CPT

## 2017-10-21 RX ORDER — IBUPROFEN 800 MG/1
800 TABLET ORAL EVERY 8 HOURS PRN
Qty: 30 TABLET | Refills: 0 | Status: ON HOLD | OUTPATIENT
Start: 2017-10-21 | End: 2018-01-06 | Stop reason: HOSPADM

## 2017-10-21 RX ORDER — HYDROCODONE BITARTRATE AND ACETAMINOPHEN 5; 325 MG/1; MG/1
2 TABLET ORAL ONCE
Status: COMPLETED | OUTPATIENT
Start: 2017-10-21 | End: 2017-10-21

## 2017-10-21 RX ORDER — CYCLOBENZAPRINE HCL 10 MG
10 TABLET ORAL 3 TIMES DAILY PRN
Qty: 12 TABLET | Refills: 0 | Status: SHIPPED | OUTPATIENT
Start: 2017-10-21 | End: 2017-10-31

## 2017-10-21 RX ADMIN — HYDROCODONE BITARTRATE AND ACETAMINOPHEN 2 TABLET: 5; 325 TABLET ORAL at 09:59

## 2017-10-21 ASSESSMENT — PAIN SCALES - GENERAL: PAINLEVEL_OUTOF10: 10

## 2017-10-21 ASSESSMENT — ENCOUNTER SYMPTOMS
SORE THROAT: 0
CHEST TIGHTNESS: 0
EYE PAIN: 0
DIARRHEA: 0
NAUSEA: 0
CONSTIPATION: 0
ABDOMINAL PAIN: 1
SHORTNESS OF BREATH: 0
VOMITING: 0
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
## 2017-10-21 NOTE — ED NOTES
[facility] ED  Notified will be sending pt for sane exam, Niko Emerson for  about VINOD Samuels  10/21/17 6125
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
## 2018-01-04 ENCOUNTER — HOSPITAL ENCOUNTER (INPATIENT)
Age: 53
LOS: 4 days | Discharge: HOME | DRG: 753 | End: 2018-01-08
Attending: PSYCHIATRY & NEUROLOGY | Admitting: PSYCHIATRY & NEUROLOGY
Payer: COMMERCIAL

## 2018-01-04 ENCOUNTER — HOSPITAL ENCOUNTER (EMERGENCY)
Age: 53
Discharge: PSYCHIATRIC OR SUBSTANCE USE FACILITY | End: 2018-01-04
Attending: EMERGENCY MEDICINE
Payer: COMMERCIAL

## 2018-01-04 VITALS
HEART RATE: 100 BPM | HEIGHT: 66 IN | TEMPERATURE: 98.6 F | OXYGEN SATURATION: 98 % | RESPIRATION RATE: 22 BRPM | BODY MASS INDEX: 47.09 KG/M2 | DIASTOLIC BLOOD PRESSURE: 93 MMHG | SYSTOLIC BLOOD PRESSURE: 140 MMHG | WEIGHT: 293 LBS

## 2018-01-04 DIAGNOSIS — F32.A DEPRESSION WITH SUICIDAL IDEATION: Primary | ICD-10-CM

## 2018-01-04 DIAGNOSIS — Z91.14 NONCOMPLIANCE WITH MEDICATION REGIMEN: ICD-10-CM

## 2018-01-04 DIAGNOSIS — R45.851 DEPRESSION WITH SUICIDAL IDEATION: Primary | ICD-10-CM

## 2018-01-04 PROBLEM — F31.9 BIPOLAR 1 DISORDER (HCC): Status: ACTIVE | Noted: 2018-01-04

## 2018-01-04 PROCEDURE — 6360000002 HC RX W HCPCS

## 2018-01-04 PROCEDURE — 99285 EMERGENCY DEPT VISIT HI MDM: CPT

## 2018-01-04 PROCEDURE — 6370000000 HC RX 637 (ALT 250 FOR IP): Performed by: PSYCHIATRY & NEUROLOGY

## 2018-01-04 PROCEDURE — 1240000000 HC EMOTIONAL WELLNESS R&B

## 2018-01-04 RX ORDER — HALOPERIDOL 5 MG/ML
INJECTION INTRAMUSCULAR
Status: COMPLETED
Start: 2018-01-04 | End: 2018-01-04

## 2018-01-04 RX ORDER — LORAZEPAM 2 MG/ML
1 INJECTION INTRAMUSCULAR
Status: ACTIVE | OUTPATIENT
Start: 2018-01-04 | End: 2018-01-04

## 2018-01-04 RX ORDER — GABAPENTIN 300 MG/1
900 CAPSULE ORAL 3 TIMES DAILY
Status: DISCONTINUED | OUTPATIENT
Start: 2018-01-04 | End: 2018-01-08 | Stop reason: HOSPADM

## 2018-01-04 RX ORDER — TRAZODONE HYDROCHLORIDE 50 MG/1
50 TABLET ORAL NIGHTLY PRN
Status: DISCONTINUED | OUTPATIENT
Start: 2018-01-04 | End: 2018-01-08 | Stop reason: HOSPADM

## 2018-01-04 RX ORDER — LORAZEPAM 1 MG/1
1 TABLET ORAL EVERY 6 HOURS PRN
Status: DISCONTINUED | OUTPATIENT
Start: 2018-01-04 | End: 2018-01-08 | Stop reason: HOSPADM

## 2018-01-04 RX ORDER — LORAZEPAM 2 MG/ML
INJECTION INTRAMUSCULAR
Status: COMPLETED
Start: 2018-01-04 | End: 2018-01-04

## 2018-01-04 RX ORDER — HYDROXYZINE HYDROCHLORIDE 25 MG/1
50 TABLET, FILM COATED ORAL 3 TIMES DAILY PRN
Status: DISCONTINUED | OUTPATIENT
Start: 2018-01-04 | End: 2018-01-08 | Stop reason: HOSPADM

## 2018-01-04 RX ORDER — BENZTROPINE MESYLATE 1 MG/ML
2 INJECTION INTRAMUSCULAR; INTRAVENOUS DAILY PRN
Status: DISCONTINUED | OUTPATIENT
Start: 2018-01-04 | End: 2018-01-08 | Stop reason: HOSPADM

## 2018-01-04 RX ORDER — ACETAMINOPHEN 325 MG/1
650 TABLET ORAL EVERY 6 HOURS PRN
Status: DISCONTINUED | OUTPATIENT
Start: 2018-01-04 | End: 2018-01-08 | Stop reason: HOSPADM

## 2018-01-04 RX ORDER — MAGNESIUM HYDROXIDE/ALUMINUM HYDROXICE/SIMETHICONE 120; 1200; 1200 MG/30ML; MG/30ML; MG/30ML
30 SUSPENSION ORAL 2 TIMES DAILY PRN
Status: DISCONTINUED | OUTPATIENT
Start: 2018-01-04 | End: 2018-01-08 | Stop reason: HOSPADM

## 2018-01-04 RX ORDER — CITALOPRAM 20 MG/1
20 TABLET ORAL DAILY
Status: DISCONTINUED | OUTPATIENT
Start: 2018-01-04 | End: 2018-01-08 | Stop reason: HOSPADM

## 2018-01-04 RX ORDER — ARIPIPRAZOLE 5 MG/1
5 TABLET ORAL DAILY
Status: DISCONTINUED | OUTPATIENT
Start: 2018-01-04 | End: 2018-01-05

## 2018-01-04 RX ORDER — HALOPERIDOL 5 MG/ML
5 INJECTION INTRAMUSCULAR 2 TIMES DAILY PRN
Status: DISCONTINUED | OUTPATIENT
Start: 2018-01-04 | End: 2018-01-08 | Stop reason: HOSPADM

## 2018-01-04 RX ORDER — HALOPERIDOL 5 MG
5 TABLET ORAL 2 TIMES DAILY PRN
Status: DISCONTINUED | OUTPATIENT
Start: 2018-01-04 | End: 2018-01-08 | Stop reason: HOSPADM

## 2018-01-04 RX ADMIN — HYDROXYZINE HYDROCHLORIDE 50 MG: 25 TABLET, FILM COATED ORAL at 23:43

## 2018-01-04 RX ADMIN — TRAZODONE HYDROCHLORIDE 50 MG: 50 TABLET ORAL at 23:43

## 2018-01-04 RX ADMIN — HALOPERIDOL LACTATE: 5 INJECTION, SOLUTION INTRAMUSCULAR at 10:04

## 2018-01-04 RX ADMIN — GABAPENTIN 900 MG: 300 CAPSULE ORAL at 23:43

## 2018-01-04 RX ADMIN — LORAZEPAM 1 MG: 2 INJECTION INTRAMUSCULAR; INTRAVENOUS at 10:05

## 2018-01-04 ASSESSMENT — ENCOUNTER SYMPTOMS
ABDOMINAL PAIN: 0
COUGH: 0
VOMITING: 0
RHINORRHEA: 0
PHOTOPHOBIA: 0
ABDOMINAL DISTENTION: 0
WHEEZING: 0
SORE THROAT: 0
BACK PAIN: 0
NAUSEA: 0
BLOOD IN STOOL: 0
SHORTNESS OF BREATH: 0

## 2018-01-04 ASSESSMENT — SLEEP AND FATIGUE QUESTIONNAIRES
RESTFUL SLEEP: NO
DO YOU USE A SLEEP AID: YES
AVERAGE NUMBER OF SLEEP HOURS: 2
SLEEP PATTERN: DIFFICULTY FALLING ASLEEP
DIFFICULTY FALLING ASLEEP: YES
DIFFICULTY STAYING ASLEEP: YES
DO YOU HAVE DIFFICULTY SLEEPING: YES
DIFFICULTY ARISING: YES

## 2018-01-04 ASSESSMENT — LIFESTYLE VARIABLES: HISTORY_ALCOHOL_USE: YES
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
## 2018-01-04 NOTE — ED NOTES
..Provisional Diagnosis:   Bipolar 1 Disorder     Psychosocial and Contextual Factors:  Not compliant with medications. Grief of losing son. C-SSRS Summary:      Patient: X  Family:   Agency: [employer]       Present Suicidal Behavior:      Verbal: Yes    Attempt: Denied     Past Suicidal Behavior:    Verbal: Yes    Attempt: Denied       Self-Injurious/Self-Mutilation: Denied     Trauma Identified: Denied       Protective Factors: Willing to sign into treatment. Risk Factors:    Suicidal w/ a plan. Grief. Not compliant with medications. Clinical Summary:    The patient is a 50year old female with a history of Bipolar 1 Disorder. The patient came into the ER reporting that she is suicidal. The patient appeared agitated and in apparent distress. Patient easily able to be calmed down. Patient reports/dispalys symptoms of grief, depressed mood, hopeless, helpless, and racing thoughts. The patient reports, \" I was going to take all my pills but I came to the hospital instead. \" The patient denied any ingestion of any medications. The patient denied any alcohol of drug use. The patient reports that she is not compliant with her Trazodone 150mg, Celexa 40mg and Abilify 10mg. The patient reports that she is not able report to contract for safety. Level of Care Disposition:    ~0700 Dr Faisal Cheney paged for LOC Disposition.

## 2018-01-04 NOTE — CARE COORDINATION
Patient is sleeping in room and will not respond to any verbal prompts patient was given PRN earlier when she came on to unit. Clinician to attempt again tomorrow.

## 2018-01-04 NOTE — ED NOTES
[] Cuca    [x] [facility]    []  [facility] ASSESSMENT      Y  N     [x] [] In the past two weeks have you had thoughts of hurting yourself in any way? [x] [] In the past two weeks have you had thoughts that you would be better off dead? [x] [] Have you made a suicide attempt in the past two months? [] [x] Do you have a plan for hurting yourself or suicide? [] [x] Presence of hallucinations/voices related to hurting himself or herself or someone else. SUICIDE/SECURITY WATCH PRECAUTION CHECKLIST     Orders    [x]  Suicide/Security Watch Precautions initiated as checked below:   1/4/18 6:48 AM BH31/BH31D    [x] Notified physician:  No att. providers found  1/4/18 6:48 AM    [x] Orders obtained as appropriate:     [x] 1:1 Observer     [] Psych Consult     [] Psych Consult    Name:  Date:  Time:    [x] 1:1 Observer, Notified by:  Ilene Painter Nurse Supervisor    [x] Remove all personal clothes from room and place in snap/paper gown/pants. Slipper only    [x] Remove all personal belongings from room and secured away from patient. Documentation    [x] Initiate Suicide/Security Watch Precaution Flow Sheet    [x] Initiate individualized Care Plan/Problem    [x] Document why precautions initiated on flow sheet (Initiate Nursing Care Plan/Problem)    [x] 1:1 Observer in place; instructions provided. Suicide precautions require observer be within arms length. [x] Nurse-Observer Communication Hand-off initiated by RN, reviewed with Observer. Subsequently used as Hand Off between Observers. [x] Initiate every 15 minute observations per observer as delegated by the RN.     [x] Initiate RN assessment and documentation    Environmental Scan  Search Criteria and Process: OPTIONAL, see Search Policy    [x] Reason for search:    [x] Nursing in presence of second person to search patient    [x] Patient notified of reason for body assessment and belongings search:     Persons present during search:   Results of search and disposition:       Searchers Name: security    These items or items similar should be removed from the room:   [] Chairs   [x] Telephone   [x] Trash cans and liners   [x] Plastic utensils (order Patient Safety tray)   [x] Empty or remove Sharps containers   [x] All personal clothing/belongings removed   [x] All unnecessary lead wires, electrical cords, draw cords, etc.   [x] Flowers and plants   [x] Double check for lighters, matches, razors, any glass items etc that can be used as weapons. Person completing Checklist: Dorothye Babinski       GENERAL INFORMATION     Y  N     [x] [] Has the patient been informed that they are on a watch and what that means? [x] [] Can the patient get out of Bed without nursing assistance? [x] [] Can the patient use the restroom without nursing assistance? [] [x] Can the patient walk the halls to [place] their legs? \"   [] [x] Does the patient have metal utensils? [x] [] Have the patient's belongings been placed out of control of the patient? [x] [] Have the patient and his/her belongings been checked for contraband? [] [x] Is the patient under any visitor restrictions? If Yes, explain:   [] [x] Is the patient under an alias? [employer] [facility] Name:   Authorized visitors (no more than two are to be on the list)   Name/Relationship:   Name/Relationship:    Name of Staff member that you  Received this information from?: writer    General Description:    Bryant Underwood female 50 y.o. Admission weight: 300 lb (136.1 kg) Height: 5' 6\" (167.6 cm)  Race: []  [x] Black  []   []   [] [place] [] Other  Facial Hair:  [] Yes  [x] No  If yes, please describe: Identifying Marks (i.e. Visible tattoos, scars, etc... ):     NURSING CARE PLAN    Nursing Diagnosis: Risk of Self Directed Harm  [] Actual  [] Potential  Date Started: 1/4/18      Etiological Factors: (related

## 2018-01-04 NOTE — ED NOTES
[address] L[address] Hoskins gave ETA 9:00am, writer informed [facility], will inform RN.       Penny Fraser, STE[place], [place]  01/04/18 0221

## 2018-01-04 NOTE — ED NOTES
Social work at bedside discussing admission. Pt states she is willing to sign herself in for treatment. Breakfast tray ordered for patient. Patient becoming more calm and cooperative at this time, expressing gratitude for treatment. Pt in [facility], sitter at bedside. Will continue to monitor.      Griselda Sales RN  01/04/18 4937

## 2018-01-05 PROBLEM — F14.10 COCAINE ABUSE (HCC): Status: ACTIVE | Noted: 2018-01-05

## 2018-01-05 LAB
ABSOLUTE EOS #: 0.1 K/UL (ref 0–0.4)
ABSOLUTE IMMATURE GRANULOCYTE: ABNORMAL K/UL (ref 0–0.3)
ABSOLUTE LYMPH #: 2.54 K/UL (ref 1–4.8)
ABSOLUTE MONO #: 0.69 K/UL (ref 0.1–1.3)
BASOPHILS # BLD: 2 % (ref 0–2)
BASOPHILS ABSOLUTE: 0.1 K/UL (ref 0–0.2)
DIFFERENTIAL TYPE: ABNORMAL
EOSINOPHILS RELATIVE PERCENT: 2 % (ref 0–4)
HCT VFR BLD CALC: 39.3 % (ref 36–46)
HEMOGLOBIN: 13.3 G/DL (ref 12–16)
IMMATURE GRANULOCYTES: ABNORMAL %
LYMPHOCYTES # BLD: 52 % (ref 24–44)
MCH RBC QN AUTO: 33.2 PG (ref 26–34)
MCHC RBC AUTO-ENTMCNC: 33.8 G/DL (ref 31–37)
MCV RBC AUTO: 98.4 FL (ref 80–100)
MONOCYTES # BLD: 14 % (ref 1–7)
MORPHOLOGY: NORMAL
PDW BLD-RTO: 13.1 % (ref 11.5–14.9)
PLATELET # BLD: 384 K/UL (ref 150–450)
PLATELET ESTIMATE: ABNORMAL
PMV BLD AUTO: 8.8 FL (ref 6–12)
RBC # BLD: 4 M/UL (ref 4–5.2)
RBC # BLD: ABNORMAL 10*6/UL
SEG NEUTROPHILS: 30 % (ref 36–66)
SEGMENTED NEUTROPHILS ABSOLUTE COUNT: 1.47 K/UL (ref 1.3–9.1)
WBC # BLD: 4.9 K/UL (ref 3.5–11)
WBC # BLD: ABNORMAL 10*3/UL

## 2018-01-05 PROCEDURE — 6370000000 HC RX 637 (ALT 250 FOR IP): Performed by: PSYCHIATRY & NEUROLOGY

## 2018-01-05 PROCEDURE — 85025 COMPLETE CBC W/AUTO DIFF WBC: CPT

## 2018-01-05 PROCEDURE — 1240000000 HC EMOTIONAL WELLNESS R&B

## 2018-01-05 PROCEDURE — 36415 COLL VENOUS BLD VENIPUNCTURE: CPT

## 2018-01-05 RX ORDER — ARIPIPRAZOLE 10 MG/1
10 TABLET ORAL DAILY
Status: DISCONTINUED | OUTPATIENT
Start: 2018-01-06 | End: 2018-01-08 | Stop reason: HOSPADM

## 2018-01-05 RX ADMIN — GABAPENTIN 900 MG: 300 CAPSULE ORAL at 14:20

## 2018-01-05 RX ADMIN — TRAZODONE HYDROCHLORIDE 50 MG: 50 TABLET ORAL at 21:15

## 2018-01-05 RX ADMIN — GABAPENTIN 900 MG: 300 CAPSULE ORAL at 21:15

## 2018-01-05 RX ADMIN — HYDROXYZINE HYDROCHLORIDE 50 MG: 25 TABLET, FILM COATED ORAL at 21:15

## 2018-01-05 RX ADMIN — CITALOPRAM HYDROBROMIDE 20 MG: 20 TABLET ORAL at 09:01

## 2018-01-05 RX ADMIN — GABAPENTIN 900 MG: 300 CAPSULE ORAL at 09:01

## 2018-01-05 RX ADMIN — ARIPIPRAZOLE 5 MG: 5 TABLET ORAL at 09:01

## 2018-01-05 RX ADMIN — HYDROXYZINE HYDROCHLORIDE 50 MG: 25 TABLET, FILM COATED ORAL at 09:02

## 2018-01-05 ASSESSMENT — SLEEP AND FATIGUE QUESTIONNAIRES
RESTFUL SLEEP: NO
SLEEP PATTERN: INSOMNIA;NIGHTMARES/TERRORS
DIFFICULTY ARISING: YES
DO YOU HAVE DIFFICULTY SLEEPING: YES
AVERAGE NUMBER OF SLEEP HOURS: 2
DIFFICULTY FALLING ASLEEP: YES
DO YOU USE A SLEEP AID: NO
DIFFICULTY STAYING ASLEEP: YES

## 2018-01-05 ASSESSMENT — PATIENT HEALTH QUESTIONNAIRE - PHQ9: SUM OF ALL RESPONSES TO PHQ QUESTIONS 1-9: 27

## 2018-01-05 ASSESSMENT — LIFESTYLE VARIABLES: HISTORY_ALCOHOL_USE: YES

## 2018-01-05 NOTE — CARE COORDINATION
BHI Biopsychosocial Assessment     Current Level of Psychosocial Functioning      Independent:   Dependent:   Minimal Assist: X    Comments: homeless and reports she has been staying friend to friend and is [facility] [place] and this has been arraigned and her flight leaves on Monday from [facility] at 11 am -      [employer] [facility]  [address]  [place], [address]  [phone]      Psychosocial High Risk Factors (check all that apply)     Unable to obtain meds:   Chronic illness/pain:    Substance abuse: X- crack use   Lack of Family Support: X  Financial stress: X  Isolation: X  Inadequate Community Resources: X - does not follow through with any linkage from hospital discharges  Suicide attempt(s):   Not taking medications: X - not compliant and not linked   Victim of crime:   Developmental Delay:   Unable to manage personal needs: X  Age 72 or older:   Homeless: X  No transportation: x  Traumatic Event: X - death of son several years ago has not worked through the grief process  Readmission within 30 days:  Unemployment: X     Psychiatric Advanced Directives: none and not interested      Family to [facility] in Treatment: Nataly Kaiser [phone]     Sexual Orientation: Heterosexual     Patient Strengths: SSDI.  [address]     Patient Barriers: homeless, not linked, AOD abuse, lack of coping skills, lack of insight, limited support system     [facility]/mental health history: not linked reports she is scheduled to go to [address] [place] [address] in [place] for Rehab that her friend coordinated for her and they are expecting her Monday     Plan of Care   medication management, group/individual therapies, family meetings, psycho -education, treatment team meetings to assist with stabilization     Initial Discharge Plan:  get to [employer] [facility] or friends home to get to [facility] in [address] [place] [address] Way via cab to friends        Clinical Summary:  Patient is a single 50year old  female whom was admitted to the [facility] for SI and AH. Patient reports she is currently expericing SI with no plan just thoughts of \"i want to die\" and denies HI/AH/VH. Patient was not very [perative during assessment and was tearful, evasive, withdrawn and dramatic during assessment.   Patient reports she has support from friend Areli Mcdonald and that she needs to catch her flight on Monday at [facility] for 11 am to get to her rehab program.

## 2018-01-06 LAB
CREAT SERPL-MCNC: 1.08 MG/DL (ref 0.5–0.9)
GFR AFRICAN AMERICAN: >60 ML/MIN
GFR NON-AFRICAN AMERICAN: 54 ML/MIN
GFR SERPL CREATININE-BSD FRML MDRD: ABNORMAL ML/MIN/{1.73_M2}
GFR SERPL CREATININE-BSD FRML MDRD: ABNORMAL ML/MIN/{1.73_M2}
GLUCOSE BLD-MCNC: 222 MG/DL (ref 65–105)
GLUCOSE BLD-MCNC: 259 MG/DL (ref 65–105)
GLUCOSE BLD-MCNC: 320 MG/DL (ref 65–105)
GLUCOSE BLD-MCNC: 345 MG/DL (ref 65–105)
GLUCOSE BLD-MCNC: 400 MG/DL (ref 65–105)

## 2018-01-06 PROCEDURE — 82565 ASSAY OF CREATININE: CPT

## 2018-01-06 PROCEDURE — 6370000000 HC RX 637 (ALT 250 FOR IP): Performed by: INTERNAL MEDICINE

## 2018-01-06 PROCEDURE — 6370000000 HC RX 637 (ALT 250 FOR IP): Performed by: PSYCHIATRY & NEUROLOGY

## 2018-01-06 PROCEDURE — 82947 ASSAY GLUCOSE BLOOD QUANT: CPT

## 2018-01-06 PROCEDURE — 36415 COLL VENOUS BLD VENIPUNCTURE: CPT

## 2018-01-06 PROCEDURE — 99222 1ST HOSP IP/OBS MODERATE 55: CPT | Performed by: INTERNAL MEDICINE

## 2018-01-06 PROCEDURE — 1240000000 HC EMOTIONAL WELLNESS R&B

## 2018-01-06 RX ORDER — CITALOPRAM 20 MG/1
20 TABLET ORAL DAILY
Qty: 30 TABLET | Refills: 0 | Status: SHIPPED | OUTPATIENT
Start: 2018-01-07 | End: 2019-02-21

## 2018-01-06 RX ORDER — TRAZODONE HYDROCHLORIDE 50 MG/1
50 TABLET ORAL NIGHTLY PRN
Qty: 30 TABLET | Refills: 0 | Status: SHIPPED | OUTPATIENT
Start: 2018-01-06 | End: 2019-02-21

## 2018-01-06 RX ORDER — ARIPIPRAZOLE 10 MG/1
10 TABLET ORAL DAILY
Qty: 30 TABLET | Refills: 0 | Status: SHIPPED | OUTPATIENT
Start: 2018-01-07 | End: 2019-02-21

## 2018-01-06 RX ORDER — GABAPENTIN 300 MG/1
900 CAPSULE ORAL 3 TIMES DAILY
Qty: 90 CAPSULE | Refills: 0 | Status: SHIPPED | OUTPATIENT
Start: 2018-01-06 | End: 2020-10-20

## 2018-01-06 RX ORDER — INSULIN GLARGINE 100 [IU]/ML
14 INJECTION, SOLUTION SUBCUTANEOUS NIGHTLY
Status: DISCONTINUED | OUTPATIENT
Start: 2018-01-06 | End: 2018-01-08 | Stop reason: HOSPADM

## 2018-01-06 RX ORDER — DEXTROSE MONOHYDRATE 25 G/50ML
12.5 INJECTION, SOLUTION INTRAVENOUS PRN
Status: DISCONTINUED | OUTPATIENT
Start: 2018-01-06 | End: 2018-01-08 | Stop reason: HOSPADM

## 2018-01-06 RX ORDER — NICOTINE POLACRILEX 4 MG
15 LOZENGE BUCCAL PRN
Status: DISCONTINUED | OUTPATIENT
Start: 2018-01-06 | End: 2018-01-08 | Stop reason: HOSPADM

## 2018-01-06 RX ORDER — DEXTROSE MONOHYDRATE 50 MG/ML
100 INJECTION, SOLUTION INTRAVENOUS PRN
Status: DISCONTINUED | OUTPATIENT
Start: 2018-01-06 | End: 2018-01-08 | Stop reason: HOSPADM

## 2018-01-06 RX ADMIN — GABAPENTIN 900 MG: 300 CAPSULE ORAL at 08:41

## 2018-01-06 RX ADMIN — ACETAMINOPHEN 650 MG: 325 TABLET, FILM COATED ORAL at 20:48

## 2018-01-06 RX ADMIN — HYDROXYZINE HYDROCHLORIDE 50 MG: 25 TABLET, FILM COATED ORAL at 20:48

## 2018-01-06 RX ADMIN — TRAZODONE HYDROCHLORIDE 50 MG: 50 TABLET ORAL at 20:49

## 2018-01-06 RX ADMIN — INSULIN GLARGINE 14 UNITS: 100 INJECTION, SOLUTION SUBCUTANEOUS at 20:49

## 2018-01-06 RX ADMIN — CITALOPRAM HYDROBROMIDE 20 MG: 20 TABLET ORAL at 08:41

## 2018-01-06 RX ADMIN — METFORMIN HYDROCHLORIDE 1000 MG: 500 TABLET, FILM COATED ORAL at 12:27

## 2018-01-06 RX ADMIN — GABAPENTIN 900 MG: 300 CAPSULE ORAL at 20:49

## 2018-01-06 RX ADMIN — ACETAMINOPHEN 650 MG: 325 TABLET, FILM COATED ORAL at 08:44

## 2018-01-06 RX ADMIN — HYDROXYZINE HYDROCHLORIDE 50 MG: 25 TABLET, FILM COATED ORAL at 08:41

## 2018-01-06 RX ADMIN — ARIPIPRAZOLE 10 MG: 10 TABLET ORAL at 08:41

## 2018-01-06 RX ADMIN — GABAPENTIN 900 MG: 300 CAPSULE ORAL at 13:53

## 2018-01-06 ASSESSMENT — PAIN SCALES - GENERAL
PAINLEVEL_OUTOF10: 5
PAINLEVEL_OUTOF10: 3
PAINLEVEL_OUTOF10: 3
PAINLEVEL_OUTOF10: 8
PAINLEVEL_OUTOF10: 10

## 2018-01-06 ASSESSMENT — PAIN DESCRIPTION - LOCATION: LOCATION: HEAD

## 2018-01-06 ASSESSMENT — PAIN DESCRIPTION - PAIN TYPE: TYPE: ACUTE PAIN

## 2018-01-06 NOTE — PROGRESS NOTES
PSYCHOEDUCATION GROUP NOTE    Date: 1-6-18  Start Time: 1600  End Time: 1625    Number Participants in Group:  8    Goal:  Patient will demonstrate increased interpersonal interaction   Topic: goals    Discipline Responsible:   OT  AT   x Nsg.  RT MHP Other       Participation Level:     None  Minimal   x Active Listener  Interactive    Monopolizing         Participation Quality:  x Appropriate  Inappropriate   xx       Attentive        Intrusive          Sharing        Resistant          Supportive        Lethargic       Affective:    Congruent  Incongruent  Blunted  Flat    Constricted  Anxious  Elated  Angry    Labile  Depressed  Other         Cognitive:  x Alert  Oriented PPTP     Concentration  G x F  P   Attention Span  G x F  P   Short-Term Memory  G x F  P   Long-Term Memory  G x F  P   ProblemSolving/  Decision Making  G x F  P   Ability to Process  Information  G x F  P      Contributing Factors             Delusional             Hallucinating             Flight of Ideas             Other:       Modes of Intervention:  x Education  Support x Exploration    Clarifying  Problem Solving  Confrontation   x Socialization  Limit Setting  Reality Testing    Activity  Movement  Media    Other:            Response to Learning:  x Able to verbalize current knowledge/experience   x Able to verbalize/acknowledge new learning   x Able to retain information    Capable of insight    Able to change behavior    Progressing to goal    Other:        Comments:
Neuropathy ([facility]     Neuropathy ([facility]     Stroke ([facility]     per chart notes        History of Psychiatric Symptoms/Review of Systems:   Jennifer: yes   Panic attacks:  No   Phobias: No   Obsessions and/or Compulsions: No   Involuntary body movements/tics: No   Hallucinations: No   Suicidal admits to  Homicidal denies  Delusions: No   Trauma/PTSD:  No       Mental Status  Pt. Is alert, evasive, guarded and withdrawn, fair cooperation,Appropriate dress, affect is Appropriate, mood is anxious, depressed and irritable . Thought process is goal directed. Thought content presents  no evidence of delusions, perceptual symptoms present -  auditory hallucinations. Evaluation of suicidal/homidal ideation: Positive for suicidal ideation and Negative for homicidal ideation. Patient gross cognitive functions are Fair. Orientation:place. Insight and judgement fair. Diagnostic Impression    (Axis I): Bipolar I disorder; depressed episode, severe and with psychotic features  Substance disorders:  Cocaine abuse    Axis II:  none  Axis III:  Past Medical History:   Diagnosis Date    Asthma     Chronic renal insufficiency     DDD (degenerative disc disease), cervical     Diabetes mellitus ([facility]     Fibromyalgia     Hypertension     Lupus     Migraine     Neuropathy (HCC)     Neuropathy (HCC)     Stroke (HCC)     per chart notes             Medications   ARIPiprazole  5 mg Oral Daily    citalopram  20 mg Oral Daily    gabapentin  900 mg Oral TID     acetaminophen, LORazepam, traZODone, benztropine mesylate, magnesium hydroxide, aluminum & magnesium hydroxide-simethicone, haloperidol lactate **OR** haloperidol, hydrOXYzine    Treatment Plan:    1. Admit to inpatient psychiatric treatment  2. Supportive therapy with medication management  3. Therapeutic activities and groups  4.  Follow up at [facility]/PCP/Psychiatrist after symptoms stabilized   With KELSEA 7 to 10 days    Sherri Roberts MD  Psychiatrist.

## 2018-01-06 NOTE — CARE COORDINATION
Clinician was informed patient has a police hold and she will not be able to leave for [place] if she does not have this lifted for her rehab. Clinician to call patient's friend with verbal consent to see if she can get a hold of an  to have this lifted for now. @ [phone] patient has notify not a police hold and clinician called and spoke with [facility] Police department officer Zo Lindsay whom informed clinician that [employer] needs to be called and see if this can be changed or to find out what the process is with these two bench warrants if [employer] will pick patient up or just give a court date over phone for patient. Clinician spoke with Supervisor Nica Castellon and updated her on status currently. @[phone] Clinician called [employer] nonemergency number to inquire about process and to see if patient will be picked up or provided a court date - general information was provided only patient name was not given and clinician was informed by a Sergeant that patient would most likely not be picked up for a bench warrant for such minor offenses. Supervisor José Valderrama to call and speak with public safety chief regarding this notice and to call clinician back today on status. @[phone] clinician confirmed with Tessie Barraza and Yifan Caal from [address] [place] [address] of inpatient rehab and that patient is linked to catch flight 1816 on [employer] at [facility] at 11 am and will [facility] where Yifan Caal from [employer] will pick patient up and transport to their rehab facility.

## 2018-01-06 NOTE — CONSULTS
72 hours. Invalid input(s): LDL  Blood Gases: No results found for: PH, PCO2, PO2, HCO3, O2SAT  Thyroid functions:   Lab Results   Component Value Date    TSH 0.87 10/10/2016        Imaging/Diagonstics:      CXR: No acute cardiopulmonary findings. ASSESSMENT:    Patient Active Problem List   Diagnosis    IDDM (insulin dependent diabetes mellitus) ([facility]    Lupus    Bipolar disorder, mixed ([facility]    Post traumatic stress disorder (PTSD)    Acute cystitis with hematuria    Hyperglycemia    Suicidal ideation    Arthritis    Chronic back pain    Acid reflux    Cerebrovascular accident (CVA) ([facility]    History of stroke    Polysubstance abuse    Bipolar 1 disorder ([facility]    Cocaine abuse       PLAN:  Uncontrolled diabetes mellitus with sulfa allergy  A1c is 9.2 from 2016  Will repeat A1c start Lantus at 14 units        MD MICHELLE Eaton[address], [address] [place] [address].    Phone [phone]   Fax: [phone]  Answering Service: [phone]

## 2018-01-07 VITALS
RESPIRATION RATE: 14 BRPM | WEIGHT: 293 LBS | SYSTOLIC BLOOD PRESSURE: 128 MMHG | TEMPERATURE: 98.4 F | HEART RATE: 86 BPM | BODY MASS INDEX: 47.09 KG/M2 | HEIGHT: 66 IN | DIASTOLIC BLOOD PRESSURE: 89 MMHG

## 2018-01-07 LAB
GLUCOSE BLD-MCNC: 186 MG/DL (ref 65–105)
GLUCOSE BLD-MCNC: 194 MG/DL (ref 65–105)

## 2018-01-07 PROCEDURE — 1240000000 HC EMOTIONAL WELLNESS R&B

## 2018-01-07 PROCEDURE — 6370000000 HC RX 637 (ALT 250 FOR IP): Performed by: INTERNAL MEDICINE

## 2018-01-07 PROCEDURE — 6370000000 HC RX 637 (ALT 250 FOR IP): Performed by: PSYCHIATRY & NEUROLOGY

## 2018-01-07 PROCEDURE — 82947 ASSAY GLUCOSE BLOOD QUANT: CPT

## 2018-01-07 RX ADMIN — METFORMIN HYDROCHLORIDE 1000 MG: 500 TABLET, FILM COATED ORAL at 08:31

## 2018-01-07 RX ADMIN — LORAZEPAM 1 MG: 1 TABLET ORAL at 13:18

## 2018-01-07 RX ADMIN — GABAPENTIN 900 MG: 300 CAPSULE ORAL at 08:31

## 2018-01-07 RX ADMIN — HYDROXYZINE HYDROCHLORIDE 50 MG: 25 TABLET, FILM COATED ORAL at 21:28

## 2018-01-07 RX ADMIN — INSULIN GLARGINE 14 UNITS: 100 INJECTION, SOLUTION SUBCUTANEOUS at 21:28

## 2018-01-07 RX ADMIN — GABAPENTIN 900 MG: 300 CAPSULE ORAL at 21:28

## 2018-01-07 RX ADMIN — GABAPENTIN 900 MG: 300 CAPSULE ORAL at 13:17

## 2018-01-07 RX ADMIN — ARIPIPRAZOLE 10 MG: 10 TABLET ORAL at 08:31

## 2018-01-07 RX ADMIN — CITALOPRAM HYDROBROMIDE 20 MG: 20 TABLET ORAL at 08:31

## 2018-01-07 RX ADMIN — HYDROXYZINE HYDROCHLORIDE 50 MG: 25 TABLET, FILM COATED ORAL at 08:31

## 2018-01-07 RX ADMIN — ACETAMINOPHEN 650 MG: 325 TABLET, FILM COATED ORAL at 13:18

## 2018-01-07 ASSESSMENT — PAIN DESCRIPTION - LOCATION
LOCATION: HEAD
LOCATION: GENERALIZED

## 2018-01-07 ASSESSMENT — PAIN DESCRIPTION - PAIN TYPE
TYPE: ACUTE PAIN
TYPE: ACUTE PAIN

## 2018-01-07 ASSESSMENT — PAIN SCALES - GENERAL
PAINLEVEL_OUTOF10: 5
PAINLEVEL_OUTOF10: 10
PAINLEVEL_OUTOF10: 10

## 2018-01-07 NOTE — CARE COORDINATION
Clinician called [employer] to scheduled trip for patient to [facility], [address] and clinician was informed the office was closed and future trips cannot be scheduled on the weekend and patient will need to be transported via day of discharge.

## 2018-01-07 NOTE — CARE COORDINATION
Clinician met with Dr Nikkie Stone and left message for supervisor on status of patient's \"police notify hold\" and is awaiting to hear back from [employer]. Call back from [employer] and patient has been cleared for discharge and she will notify Donnie in morning at 7 am and patient needs to leave by 830 am to get to [employer] [facility] [employer]16 to [employer].

## 2018-01-08 PROCEDURE — 6370000000 HC RX 637 (ALT 250 FOR IP): Performed by: PSYCHIATRY & NEUROLOGY

## 2018-01-08 PROCEDURE — 5130000000 HC BRIDGE APPOINTMENT: Performed by: COUNSELOR

## 2018-01-08 RX ADMIN — METFORMIN HYDROCHLORIDE 1000 MG: 500 TABLET, FILM COATED ORAL at 06:35

## 2018-01-08 RX ADMIN — LORAZEPAM 1 MG: 1 TABLET ORAL at 06:38

## 2018-01-08 NOTE — CARE COORDINATION
Name: Patti Mcdonald    : 1965    Discharge Date: 17    Primary Auth/Cert #: [id number]    [facility] [place]    Discharge Medications:      Medication List      CHANGE how you take these medications    * ARIPiprazole 10 MG tablet  Commonly known as:  ABILIFY  Take 1 tablet by mouth daily  What changed:  Another medication with the same name was added. Make sure you understand how and when to take each. Notes to patient: To help stabilize mood. * ARIPiprazole 10 MG tablet  Commonly known as:  ABILIFY  Take 1 tablet by mouth daily  What changed: You were already taking a medication with the same name, and this prescription was added. Make sure you understand how and when to take each. Notes to patient:  Duplicate see above     * citalopram 40 MG tablet  Commonly known as:  CELEXA  Take 1 tablet by mouth daily  What changed:  Another medication with the same name was added. Make sure you understand how and when to take each. Notes to patient:  discontinued     * citalopram 20 MG tablet  Commonly known as:  CELEXA  Take 1 tablet by mouth daily  What changed: You were already taking a medication with the same name, and this prescription was added. Make sure you understand how and when to take each. Notes to patient: To help with depression      metFORMIN 1000 MG tablet  Commonly known as:  GLUCOPHAGE  Take 1 tablet by mouth daily (with breakfast)  What changed:  when to take this  Notes to patient: To help manage blood sugar     * traZODone 150 MG tablet  Commonly known as:  DESYREL  Take 1 tablet by mouth nightly  What changed:  Another medication with the same name was added. Make sure you understand how and when to take each. Notes to patient:  Discontinued      * traZODone 50 MG tablet  Commonly known as:  DESYREL  Take 1 tablet by mouth nightly as needed for Sleep  What changed:   You were already taking a medication with the same name, and this prescription was added. Make sure you understand how and when to take each. Notes to patient: To help promote sleep as needed        * This list has 6 medication(s) that are the same as other medications prescribed for you. Read the directions carefully, and ask your doctor or other care provider to review them with you. CONTINUE taking these medications    gabapentin 300 MG capsule  Commonly known as:  NEURONTIN  Take 3 capsules by mouth 3 times daily for 30 days. Notes to patient:   To help with pain        STOP taking these medications    hydrOXYzine 50 MG tablet  Commonly known as:  ATARAX     ibuprofen 800 MG tablet  Commonly known as:  ADVIL;MOTRIN     insulin glargine 100 UNIT/ML injection vial  Commonly known as:  LANTUS     pioglitazone 30 MG tablet  Commonly known as:  ACTOS           Where to Get Your Medications      You can get these medications from any pharmacy    Bring a paper prescription for each of these medications  · ARIPiprazole 10 MG tablet  · citalopram 20 MG tablet  · gabapentin 300 MG capsule  · metFORMIN 1000 MG tablet  · traZODone 50 MG tablet         Follow Up Appointment: [facility]   [address] [place] [address] Drive  [facility], [facility] [address]  [phone] -Oksana Luke     is going inpatient for rehabilitation

## 2018-01-08 NOTE — PLAN OF CARE
Problem: Altered Mood, Psychotic Behavior  Goal: LTG-Able to verbalize decrease in frequency and intensity of hallucinations  Outcome: Ongoing  Patient was not able to verbalize a decrease in the intensity of hallucinations.
Problem: Altered Mood, Psychotic Behavior  Goal: LTG-Able to verbalize decrease in frequency and intensity of hallucinations  Outcome: Ongoing  Pt. Admits to fleeting SI with no plan. Feels safe. Admits to depression and anxiety. Cooperative.
Problem: Altered Mood, Psychotic Behavior  Goal: LTG-Able to verbalize reality based thinking  Outcome: Ongoing  Patient is unable to verbalize reality based thinking.
Problem: Altered Mood, Psychotic Behavior  Goal: STG-Able to demonstrate trust by eating, participating in treatment and following staff's directions  [facility]  Initial Interdisciplinary Treatment Plan NO      Original treatment plan Date & Time: 1/5/18    Admission Type:  Admission Type: Voluntary    Reason for admission:        Estimated Length of Stay:  5-7days  Estimated Discharge Date: to be determined by physician    PATIENT STRENGTHS:  Patient Strengths:Strengths:  (unable to assess at this time)  Patient Strengths and Limitations:Limitations: Inappropriate/potentially harmful leisure interests, Hopeless about future, Difficulty problem solving/relies on others to help solve problems, Multiple barriers to leisure interests, Demonstrates discomfort with /lack of social skills, Difficult relationships / poor social skills  Addictive Behavior: Addictive Behavior  In the past 3 months, have you felt or has someone told you that you have a problem with:  : None  Do you have a history of Chemical Use?: Yes  Do you have a history of Alcohol Use?: Yes  Do you have a history of Street Drug Abuse?: Yes  Histroy of Prescripton Drug Abuse?:  (unable to assess)  Medical Problems:  Past Medical History:   Diagnosis Date    Asthma     Chronic renal insufficiency     DDD (degenerative disc disease), cervical     Diabetes mellitus ([facility]     Fibromyalgia     Hypertension     Lupus     Migraine     Neuropathy ([facility]     [facility]     Stroke ([facility]     per chart notes     Status EXAM:Status and Exam  Normal: No  Facial Expression: Worried  Affect: Unstable  Level of Consciousness: Confused  Mood:Normal: No  Mood: Anxious, Labile, Suspicious, Angry, Irritable  Motor Activity:Normal: No  Motor Activity: Agitated  Interview Behavior: Aggressive, Irritable  Preception: [place] to Person, [place] to Time, [place] to Place, [place] to Situation  Attention:Normal: No  Attention: Unable to
Problem: Altered Mood, Psychotic Behavior  Goal: STG-Able to demonstrate trust by eating, participating in treatment and following staff's directions  Psychoeducation Group Note    Date: 1/6/18  Start Time: 1400  End Time: 1500    Number Participants in Group:  6/17    Goal:  Patient will demonstrate increased interpersonal interaction   Topic: Benefits of journaling, increasing positive coping skills    Discipline Responsible:   OT  AT  [facility]. X RT  Other       Participation Level:     None  Minimal   X Active Listener X Interactive    Monopolizing         Participation Quality:  X Appropriate  Inappropriate   X       Attentive        Intrusive   X       Sharing        Resistant          Supportive        Lethargic       Affective:   X Congruent  Incongruent  Blunted  Flat    Constricted  Anxious  Elated  Angry    Labile  Depressed  Other         Cognitive:  X Alert X Oriented PPTP     Concentration X G  F  P   Attention Span X G  F  P   Short-Term Memory  G  F  P   Long-Term Memory  G  F  P   ProblemSolving/  Decision Making X G  F  P   Ability to Process  Information X G  F  P      Contributing Factors             Delusional             Hallucinating             Flight of Ideas             Other:       Modes of Intervention:  X Education  Support X Exploration    Clarifying X Problem Solving  Confrontation   X Socialization  Limit Setting  Reality Testing   X Activity  Movement  Media    Other:            Response to Learning:  X Able to verbalize current knowledge/experience   X Able to verbalize/acknowledge new learning   X Able to retain information    Capable of insight    Able to change behavior    Progressing to goal    Other:        Comments:
Problem: Altered Mood, Psychotic Behavior  Goal: STG-Adequate nutritional intake  Patient did not attend goal setting and community meeting from [phone] despite staff encouragement and prompts.
Problem: Altered Mood, Psychotic Behavior  Goal: STG-Medication therapy compliance  Psychoeducation Group Note    Date: 1/7/18  Start Time: 1400  End Time: 1500    Number Participants in Group:  7/18    Goal:  Patient will demonstrate increased interpersonal interaction   Topic: learning how to budget    Discipline Responsible:   OT  AT  [facility]. x RT  Other       Participation Level:     None  Minimal   x Active Listener x Interactive    Monopolizing         Participation Quality:  x Appropriate  Inappropriate   x       Attentive        Intrusive          Sharing        Resistant          Supportive        Lethargic       Affective:   x Congruent  Incongruent  Blunted  Flat    Constricted  Anxious  Elated  Angry    Labile  Depressed  Other         Cognitive:  x Alert x Oriented PPTP     Concentration x G  F  P   Attention Span x G  F  P   Short-Term Memory  G  F  P   Long-Term Memory  G  F  P   ProblemSolving/  Decision Making x G  F  P   Ability to Process  Information x G  F  P      Contributing Factors             Delusional             Hallucinating             Flight of Ideas             Other:       Modes of Intervention:  x Education x Support x Exploration    Clarifying  Problem Solving  Confrontation   x Socialization  Limit Setting  Reality Testing   x Activity  Movement  Media    Other:            Response to Learning:  x Able to verbalize current knowledge/experience   x Able to verbalize/acknowledge new learning    Able to retain information    Capable of insight    Able to change behavior   x Progressing to goal    Other:        Comments:
Problem: Anger Management/Homicidal Ideation  Goal: STG-Able to express anger through appropriate verbalization and healthy physical outlets  Outcome: Ongoing  Pt was calm and cooperative this evening and looking forward to discharge in the morning. Goal: LTG-Absence of angry outbursts  Outcome: Ongoing  Pt had no angry outbursts this evening. Pt is free of self harm at this time. Pt agrees to seek out staff if thoughts to harm self arise. Staff will provide support and reassurance as needed. Safety checks maintained every 15 minutes. Goal: LTG-Absence of homicidal ideation  Outcome: Ongoing  Pt denies homicidal ideation at this time. Problem: Altered Mood, Psychotic Behavior  Goal: STG-Medication therapy compliance  Outcome: Ongoing  Pt was medication compliant at this time.
of Content  Hallucinations: None  Delusions: No  Memory:Normal: No  Memory: Poor Recent, Poor Remote  Insight and Judgment: No  Insight and Judgment: Poor Judgment, Poor Insight  Present Suicidal Ideation: No  Present Homicidal Ideation: No    Daily Assessment Last Entry:   Daily Sleep (WDL): Within Defined Limits         Patient Currently in Pain: Yes  Daily Nutrition (WDL): Within Defined Limits    Patient Monitoring:  Frequency of Checks: 4 times per hour, close    Psychiatric Symptoms:   Depression Symptoms  Depression Symptoms: Psychomotor retardation, Feelings of helplessness, Feelings of hopelessess, Isolative, Feelings of worthlessness, Loss of interest  Anxiety Symptoms  Anxiety Symptoms: Generalized  Jennifer Symptoms  Jennifer Symptoms: No problems reported or observed. Psychosis Symptoms  Delusion Type: No problems reported or observed. Suicide Risk CSSR-S:  1) Within the past month, have you wished you were dead or wished you could go to sleep and not wake up? : YES  2) Within the past month, have you actually had any thoughts of killing yourself? : YES  3) Within the past month, have you been thinking about how you might kill yourself? : YES  5) Within the past month, have you started to work out or worked out the details of how to kill yourself?  Do you intend to carry out this plan? : YES  6)  Have you ever done anything, started to do anything, or prepared to do anything to end your life?: YES  Change in Result na  Change in Plan of care na       EDUCATION:   EDUCATION:   Learner Progress Toward Treatment Goals: Reviewed results and recommendations of this team, Reviewed group plan and strategies, Reviewed signs, symptoms and risk of self harm and violent behavior, Reviewed goals and plan of care    Method:small group, individual verbal education    Outcome:verbalized by patient, but needs reinforcement to obtain goals    PATIENT GOALS:  Short term: Patient is encouraged to set goals   Long term:

## 2018-01-19 NOTE — DISCHARGE SUMMARY
take each. Notes to patient: To help stabilize mood. * ARIPiprazole 10 MG tablet  Commonly known as:  ABILIFY  Take 1 tablet by mouth daily  What changed: You were already taking a medication with the same name, and this prescription was added. Make sure you understand how and when to take each. Notes to patient:  Duplicate see above     * citalopram 40 MG tablet  Commonly known as:  CELEXA  Take 1 tablet by mouth daily  What changed:  Another medication with the same name was added. Make sure you understand how and when to take each. Notes to patient:  discontinued     * citalopram 20 MG tablet  Commonly known as:  CELEXA  Take 1 tablet by mouth daily  What changed: You were already taking a medication with the same name, and this prescription was added. Make sure you understand how and when to take each. Notes to patient: To help with depression      metFORMIN 1000 MG tablet  Commonly known as:  GLUCOPHAGE  Take 1 tablet by mouth daily (with breakfast)  What changed:  when to take this  Notes to patient: To help manage blood sugar     * traZODone 150 MG tablet  Commonly known as:  DESYREL  Take 1 tablet by mouth nightly  What changed:  Another medication with the same name was added. Make sure you understand how and when to take each. Notes to patient:  Discontinued      * traZODone 50 MG tablet  Commonly known as:  DESYREL  Take 1 tablet by mouth nightly as needed for Sleep  What changed: You were already taking a medication with the same name, and this prescription was added. Make sure you understand how and when to take each. Notes to patient: To help promote sleep as needed        * This list has 6 medication(s) that are the same as other medications prescribed for you. Read the directions carefully, and ask your doctor or other care provider to review them with you.             CONTINUE taking these medications    gabapentin 300 MG capsule  Commonly known as:  NEURONTIN  Take 3 capsules by

## 2018-10-01 ENCOUNTER — HOSPITAL ENCOUNTER (EMERGENCY)
Age: 53
Discharge: HOME | End: 2018-10-01
Attending: EMERGENCY MEDICINE
Payer: COMMERCIAL

## 2018-10-01 ENCOUNTER — APPOINTMENT (OUTPATIENT)
Dept: GENERAL RADIOLOGY | Age: 53
End: 2018-10-01
Payer: COMMERCIAL

## 2018-10-01 VITALS
DIASTOLIC BLOOD PRESSURE: 93 MMHG | BODY MASS INDEX: 32.14 KG/M2 | RESPIRATION RATE: 16 BRPM | HEART RATE: 104 BPM | TEMPERATURE: 98 F | SYSTOLIC BLOOD PRESSURE: 150 MMHG | HEIGHT: 66 IN | WEIGHT: 200 LBS | OXYGEN SATURATION: 99 %

## 2018-10-01 DIAGNOSIS — S93.402A SPRAIN OF LEFT ANKLE, UNSPECIFIED LIGAMENT, INITIAL ENCOUNTER: Primary | ICD-10-CM

## 2018-10-01 DIAGNOSIS — S90.02XA CONTUSION OF LEFT ANKLE, INITIAL ENCOUNTER: ICD-10-CM

## 2018-10-01 PROCEDURE — 73610 X-RAY EXAM OF ANKLE: CPT

## 2018-10-01 PROCEDURE — 73630 X-RAY EXAM OF FOOT: CPT

## 2018-10-01 PROCEDURE — 99283 EMERGENCY DEPT VISIT LOW MDM: CPT

## 2018-10-01 RX ORDER — IBUPROFEN 800 MG/1
800 TABLET ORAL EVERY 8 HOURS PRN
Qty: 20 TABLET | Refills: 0 | Status: SHIPPED | OUTPATIENT
Start: 2018-10-01 | End: 2019-03-30 | Stop reason: ALTCHOICE

## 2018-10-01 RX ORDER — ACETAMINOPHEN 325 MG/1
650 TABLET ORAL EVERY 6 HOURS PRN
Qty: 30 TABLET | Refills: 0 | Status: SHIPPED | OUTPATIENT
Start: 2018-10-01 | End: 2020-10-20 | Stop reason: SDUPTHER

## 2018-10-01 RX ORDER — ACETAMINOPHEN 500 MG
1000 TABLET ORAL ONCE
Status: COMPLETED | OUTPATIENT
Start: 2018-10-01 | End: 2018-10-01

## 2018-10-01 RX ADMIN — Medication 1000 MG: at 17:54

## 2018-10-01 ASSESSMENT — ENCOUNTER SYMPTOMS
COLOR CHANGE: 1
WHEEZING: 0
NAUSEA: 0
ABDOMINAL PAIN: 0
COUGH: 0
VOMITING: 0

## 2018-10-01 ASSESSMENT — PAIN DESCRIPTION - ORIENTATION: ORIENTATION: LEFT

## 2018-10-01 ASSESSMENT — PAIN DESCRIPTION - DESCRIPTORS: DESCRIPTORS: ACHING

## 2018-10-01 ASSESSMENT — PAIN SCALES - GENERAL: PAINLEVEL_OUTOF10: 7

## 2018-10-01 ASSESSMENT — PAIN DESCRIPTION - LOCATION: LOCATION: LEG;FOOT;ANKLE

## 2018-10-01 ASSESSMENT — PAIN DESCRIPTION - FREQUENCY: FREQUENCY: CONTINUOUS

## 2018-10-01 ASSESSMENT — PAIN DESCRIPTION - PAIN TYPE: TYPE: ACUTE PAIN

## 2018-10-01 ASSESSMENT — PAIN DESCRIPTION - ONSET: ONSET: SUDDEN

## 2019-02-21 ENCOUNTER — HOSPITAL ENCOUNTER (OUTPATIENT)
Age: 54
Setting detail: OBSERVATION
Discharge: HOME | End: 2019-02-22
Attending: EMERGENCY MEDICINE | Admitting: EMERGENCY MEDICINE
Payer: COMMERCIAL

## 2019-02-21 ENCOUNTER — APPOINTMENT (OUTPATIENT)
Dept: GENERAL RADIOLOGY | Age: 54
End: 2019-02-21
Payer: COMMERCIAL

## 2019-02-21 DIAGNOSIS — R07.9 CHEST PAIN, UNSPECIFIED TYPE: Primary | ICD-10-CM

## 2019-02-21 LAB
ABSOLUTE EOS #: 0.14 K/UL (ref 0–0.44)
ABSOLUTE IMMATURE GRANULOCYTE: 0.03 K/UL (ref 0–0.3)
ABSOLUTE LYMPH #: 1.73 K/UL (ref 1.1–3.7)
ABSOLUTE MONO #: 0.64 K/UL (ref 0.1–1.2)
ALLEN TEST: ABNORMAL
ANION GAP SERPL CALCULATED.3IONS-SCNC: 21 MMOL/L (ref 9–17)
ANION GAP: 7 MMOL/L (ref 7–16)
BASOPHILS # BLD: 1 % (ref 0–2)
BASOPHILS ABSOLUTE: 0.05 K/UL (ref 0–0.2)
BUN BLDV-MCNC: 17 MG/DL (ref 6–20)
BUN/CREAT BLD: ABNORMAL (ref 9–20)
CALCIUM SERPL-MCNC: 10 MG/DL (ref 8.6–10.4)
CHLORIDE BLD-SCNC: 92 MMOL/L (ref 98–107)
CO2: 20 MMOL/L (ref 20–31)
CREAT SERPL-MCNC: 1 MG/DL (ref 0.5–0.9)
DIFFERENTIAL TYPE: NORMAL
EOSINOPHILS RELATIVE PERCENT: 2 % (ref 1–4)
ESTIMATED AVERAGE GLUCOSE: 252 MG/DL
FIO2: ABNORMAL
GFR AFRICAN AMERICAN: >60 ML/MIN
GFR NON-AFRICAN AMERICAN: 52 ML/MIN
GFR NON-AFRICAN AMERICAN: 58 ML/MIN
GFR SERPL CREATININE-BSD FRML MDRD: >60 ML/MIN
GFR SERPL CREATININE-BSD FRML MDRD: ABNORMAL ML/MIN/{1.73_M2}
GLUCOSE BLD-MCNC: 157 MG/DL (ref 65–105)
GLUCOSE BLD-MCNC: 370 MG/DL (ref 74–100)
GLUCOSE BLD-MCNC: 452 MG/DL (ref 65–105)
GLUCOSE BLD-MCNC: 453 MG/DL (ref 70–99)
GLUCOSE BLD-MCNC: 488 MG/DL (ref 65–105)
GLUCOSE BLD-MCNC: 554 MG/DL (ref 65–105)
HBA1C MFR BLD: 10.4 % (ref 4–6)
HCO3 VENOUS: 27 MMOL/L (ref 22–29)
HCT VFR BLD CALC: 41.4 % (ref 36.3–47.1)
HEMOGLOBIN: 14.3 G/DL (ref 11.9–15.1)
IMMATURE GRANULOCYTES: 0 %
LYMPHOCYTES # BLD: 24 % (ref 24–43)
MCH RBC QN AUTO: 33.5 PG (ref 25.2–33.5)
MCHC RBC AUTO-ENTMCNC: 34.5 G/DL (ref 28.4–34.8)
MCV RBC AUTO: 97 FL (ref 82.6–102.9)
MODE: ABNORMAL
MONOCYTES # BLD: 9 % (ref 3–12)
NEGATIVE BASE EXCESS, VEN: ABNORMAL (ref 0–2)
NRBC AUTOMATED: 0 PER 100 WBC
O2 DEVICE/FLOW/%: ABNORMAL
O2 SAT, VEN: 45 % (ref 60–85)
PATIENT TEMP: ABNORMAL
PCO2, VEN: 51.4 MM HG (ref 41–51)
PDW BLD-RTO: 11.9 % (ref 11.8–14.4)
PH VENOUS: 7.33 (ref 7.32–7.43)
PLATELET # BLD: 311 K/UL (ref 138–453)
PLATELET ESTIMATE: NORMAL
PMV BLD AUTO: 11.4 FL (ref 8.1–13.5)
PO2, VEN: 27.3 MM HG (ref 30–50)
POC CHLORIDE: 99 MMOL/L (ref 98–107)
POC CREATININE: 1.1 MG/DL (ref 0.51–1.19)
POC HEMATOCRIT: 47 % (ref 36–46)
POC HEMOGLOBIN: 16 G/DL (ref 12–16)
POC IONIZED CALCIUM: 1.16 MMOL/L (ref 1.15–1.33)
POC LACTIC ACID: 1.34 MMOL/L (ref 0.56–1.39)
POC PCO2 TEMP: ABNORMAL MM HG
POC PH TEMP: ABNORMAL
POC PO2 TEMP: ABNORMAL MM HG
POC POTASSIUM: 4.1 MMOL/L (ref 3.5–4.5)
POC SODIUM: 133 MMOL/L (ref 138–146)
POSITIVE BASE EXCESS, VEN: 0 (ref 0–3)
POTASSIUM SERPL-SCNC: 3.9 MMOL/L (ref 3.7–5.3)
RBC # BLD: 4.27 M/UL (ref 3.95–5.11)
RBC # BLD: NORMAL 10*6/UL
SAMPLE SITE: ABNORMAL
SEG NEUTROPHILS: 64 % (ref 36–65)
SEGMENTED NEUTROPHILS ABSOLUTE COUNT: 4.69 K/UL (ref 1.5–8.1)
SODIUM BLD-SCNC: 133 MMOL/L (ref 135–144)
TOTAL CO2, VENOUS: 29 MMOL/L (ref 23–30)
TROPONIN INTERP: ABNORMAL
TROPONIN INTERP: ABNORMAL
TROPONIN T: ABNORMAL NG/ML
TROPONIN T: ABNORMAL NG/ML
TROPONIN, HIGH SENSITIVITY: 15 NG/L (ref 0–14)
TROPONIN, HIGH SENSITIVITY: 15 NG/L (ref 0–14)
WBC # BLD: 7.3 K/UL (ref 3.5–11.3)
WBC # BLD: NORMAL 10*3/UL

## 2019-02-21 PROCEDURE — G0378 HOSPITAL OBSERVATION PER HR: HCPCS

## 2019-02-21 PROCEDURE — 71046 X-RAY EXAM CHEST 2 VIEWS: CPT

## 2019-02-21 PROCEDURE — 94640 AIRWAY INHALATION TREATMENT: CPT

## 2019-02-21 PROCEDURE — 6370000000 HC RX 637 (ALT 250 FOR IP): Performed by: STUDENT IN AN ORGANIZED HEALTH CARE EDUCATION/TRAINING PROGRAM

## 2019-02-21 PROCEDURE — 82435 ASSAY OF BLOOD CHLORIDE: CPT

## 2019-02-21 PROCEDURE — 83605 ASSAY OF LACTIC ACID: CPT

## 2019-02-21 PROCEDURE — 82947 ASSAY GLUCOSE BLOOD QUANT: CPT

## 2019-02-21 PROCEDURE — 84484 ASSAY OF TROPONIN QUANT: CPT

## 2019-02-21 PROCEDURE — 83036 HEMOGLOBIN GLYCOSYLATED A1C: CPT

## 2019-02-21 PROCEDURE — 6370000000 HC RX 637 (ALT 250 FOR IP): Performed by: EMERGENCY MEDICINE

## 2019-02-21 PROCEDURE — 84132 ASSAY OF SERUM POTASSIUM: CPT

## 2019-02-21 PROCEDURE — 96372 THER/PROPH/DIAG INJ SC/IM: CPT

## 2019-02-21 PROCEDURE — 85025 COMPLETE CBC W/AUTO DIFF WBC: CPT

## 2019-02-21 PROCEDURE — 84295 ASSAY OF SERUM SODIUM: CPT

## 2019-02-21 PROCEDURE — 80048 BASIC METABOLIC PNL TOTAL CA: CPT

## 2019-02-21 PROCEDURE — 82803 BLOOD GASES ANY COMBINATION: CPT

## 2019-02-21 PROCEDURE — 82565 ASSAY OF CREATININE: CPT

## 2019-02-21 PROCEDURE — 2580000003 HC RX 258: Performed by: EMERGENCY MEDICINE

## 2019-02-21 PROCEDURE — 99285 EMERGENCY DEPT VISIT HI MDM: CPT

## 2019-02-21 PROCEDURE — 82330 ASSAY OF CALCIUM: CPT

## 2019-02-21 PROCEDURE — 85014 HEMATOCRIT: CPT

## 2019-02-21 PROCEDURE — 93005 ELECTROCARDIOGRAM TRACING: CPT

## 2019-02-21 RX ORDER — ARIPIPRAZOLE 10 MG/1
10 TABLET ORAL DAILY
Status: DISCONTINUED | OUTPATIENT
Start: 2019-02-21 | End: 2019-02-22 | Stop reason: HOSPADM

## 2019-02-21 RX ORDER — ALBUTEROL SULFATE 2.5 MG/3ML
2.5 SOLUTION RESPIRATORY (INHALATION)
Status: DISCONTINUED | OUTPATIENT
Start: 2019-02-21 | End: 2019-02-21

## 2019-02-21 RX ORDER — SODIUM CHLORIDE 0.9 % (FLUSH) 0.9 %
10 SYRINGE (ML) INJECTION EVERY 12 HOURS SCHEDULED
Status: DISCONTINUED | OUTPATIENT
Start: 2019-02-21 | End: 2019-02-22 | Stop reason: HOSPADM

## 2019-02-21 RX ORDER — ASPIRIN 81 MG/1
324 TABLET, CHEWABLE ORAL ONCE
Status: COMPLETED | OUTPATIENT
Start: 2019-02-21 | End: 2019-02-21

## 2019-02-21 RX ORDER — SODIUM CHLORIDE 0.9 % (FLUSH) 0.9 %
10 SYRINGE (ML) INJECTION PRN
Status: DISCONTINUED | OUTPATIENT
Start: 2019-02-21 | End: 2019-02-22 | Stop reason: HOSPADM

## 2019-02-21 RX ORDER — ACETAMINOPHEN 325 MG/1
650 TABLET ORAL EVERY 6 HOURS PRN
Status: DISCONTINUED | OUTPATIENT
Start: 2019-02-21 | End: 2019-02-22 | Stop reason: HOSPADM

## 2019-02-21 RX ORDER — DEXTROSE MONOHYDRATE 25 G/50ML
12.5 INJECTION, SOLUTION INTRAVENOUS PRN
Status: DISCONTINUED | OUTPATIENT
Start: 2019-02-21 | End: 2019-02-22 | Stop reason: HOSPADM

## 2019-02-21 RX ORDER — IBUPROFEN 400 MG/1
600 TABLET ORAL EVERY 6 HOURS PRN
Status: DISCONTINUED | OUTPATIENT
Start: 2019-02-21 | End: 2019-02-22

## 2019-02-21 RX ORDER — DEXTROSE MONOHYDRATE 50 MG/ML
100 INJECTION, SOLUTION INTRAVENOUS PRN
Status: DISCONTINUED | OUTPATIENT
Start: 2019-02-21 | End: 2019-02-22 | Stop reason: HOSPADM

## 2019-02-21 RX ORDER — NICOTINE POLACRILEX 4 MG
15 LOZENGE BUCCAL PRN
Status: DISCONTINUED | OUTPATIENT
Start: 2019-02-21 | End: 2019-02-22 | Stop reason: HOSPADM

## 2019-02-21 RX ORDER — ALBUTEROL SULFATE 90 UG/1
2 AEROSOL, METERED RESPIRATORY (INHALATION) EVERY 6 HOURS PRN
Status: DISCONTINUED | OUTPATIENT
Start: 2019-02-21 | End: 2019-02-22 | Stop reason: HOSPADM

## 2019-02-21 RX ORDER — ACETAMINOPHEN 325 MG/1
650 TABLET ORAL EVERY 4 HOURS PRN
Status: DISCONTINUED | OUTPATIENT
Start: 2019-02-21 | End: 2019-02-22

## 2019-02-21 RX ORDER — CITALOPRAM 20 MG/1
40 TABLET ORAL DAILY
Status: DISCONTINUED | OUTPATIENT
Start: 2019-02-21 | End: 2019-02-22 | Stop reason: HOSPADM

## 2019-02-21 RX ADMIN — TRAZODONE HYDROCHLORIDE 150 MG: 100 TABLET ORAL at 20:20

## 2019-02-21 RX ADMIN — INSULIN LISPRO 6 UNITS: 100 INJECTION, SOLUTION INTRAVENOUS; SUBCUTANEOUS at 18:30

## 2019-02-21 RX ADMIN — ALBUTEROL SULFATE 2 PUFF: 90 AEROSOL, METERED RESPIRATORY (INHALATION) at 21:27

## 2019-02-21 RX ADMIN — INSULIN LISPRO 15 UNITS: 100 INJECTION, SOLUTION INTRAVENOUS; SUBCUTANEOUS at 09:40

## 2019-02-21 RX ADMIN — ASPIRIN 81 MG 324 MG: 81 TABLET ORAL at 07:32

## 2019-02-21 RX ADMIN — IBUPROFEN 600 MG: 400 TABLET, FILM COATED ORAL at 20:29

## 2019-02-21 RX ADMIN — INSULIN LISPRO 3 UNITS: 100 INJECTION, SOLUTION INTRAVENOUS; SUBCUTANEOUS at 20:21

## 2019-02-21 RX ADMIN — Medication 10 ML: at 20:23

## 2019-02-21 ASSESSMENT — PAIN SCALES - GENERAL
PAINLEVEL_OUTOF10: 4
PAINLEVEL_OUTOF10: 10
PAINLEVEL_OUTOF10: 5

## 2019-02-21 ASSESSMENT — PAIN DESCRIPTION - LOCATION: LOCATION: CHEST

## 2019-02-21 ASSESSMENT — ENCOUNTER SYMPTOMS
COUGH: 1
BLOOD IN STOOL: 0
SHORTNESS OF BREATH: 1
BACK PAIN: 0
CHEST TIGHTNESS: 1
TROUBLE SWALLOWING: 0
VOMITING: 0
SORE THROAT: 0
EYE REDNESS: 0
ABDOMINAL PAIN: 0
DIARRHEA: 0
NAUSEA: 0
PHOTOPHOBIA: 0
WHEEZING: 0

## 2019-02-21 ASSESSMENT — HEART SCORE: ECG: 0

## 2019-02-21 ASSESSMENT — PAIN DESCRIPTION - ORIENTATION: ORIENTATION: MID

## 2019-02-21 ASSESSMENT — PAIN DESCRIPTION - PAIN TYPE: TYPE: ACUTE PAIN

## 2019-02-21 ASSESSMENT — PAIN DESCRIPTION - DESCRIPTORS: DESCRIPTORS: ACHING;THROBBING

## 2019-02-21 ASSESSMENT — PAIN DESCRIPTION - FREQUENCY: FREQUENCY: CONTINUOUS

## 2019-02-22 VITALS
RESPIRATION RATE: 16 BRPM | HEART RATE: 93 BPM | OXYGEN SATURATION: 93 % | TEMPERATURE: 97.2 F | WEIGHT: 200 LBS | SYSTOLIC BLOOD PRESSURE: 96 MMHG | DIASTOLIC BLOOD PRESSURE: 60 MMHG | BODY MASS INDEX: 32.28 KG/M2

## 2019-02-22 LAB
ABSOLUTE EOS #: 0.21 K/UL (ref 0–0.44)
ABSOLUTE IMMATURE GRANULOCYTE: <0.03 K/UL (ref 0–0.3)
ABSOLUTE LYMPH #: 1.86 K/UL (ref 1.1–3.7)
ABSOLUTE MONO #: 0.63 K/UL (ref 0.1–1.2)
ALBUMIN SERPL-MCNC: 3.8 G/DL (ref 3.5–5.2)
ALBUMIN/GLOBULIN RATIO: 1.2 (ref 1–2.5)
ALP BLD-CCNC: 91 U/L (ref 35–104)
ALT SERPL-CCNC: 12 U/L (ref 5–33)
ANION GAP SERPL CALCULATED.3IONS-SCNC: 15 MMOL/L (ref 9–17)
ANION GAP SERPL CALCULATED.3IONS-SCNC: 22 MMOL/L (ref 9–17)
AST SERPL-CCNC: 13 U/L
BASOPHILS # BLD: 1 % (ref 0–2)
BASOPHILS ABSOLUTE: 0.03 K/UL (ref 0–0.2)
BILIRUB SERPL-MCNC: 0.17 MG/DL (ref 0.3–1.2)
BUN BLDV-MCNC: 31 MG/DL (ref 6–20)
BUN BLDV-MCNC: 31 MG/DL (ref 6–20)
BUN/CREAT BLD: ABNORMAL (ref 9–20)
BUN/CREAT BLD: ABNORMAL (ref 9–20)
CALCIUM SERPL-MCNC: 8.9 MG/DL (ref 8.6–10.4)
CALCIUM SERPL-MCNC: 9.2 MG/DL (ref 8.6–10.4)
CHLORIDE BLD-SCNC: 100 MMOL/L (ref 98–107)
CHLORIDE BLD-SCNC: 98 MMOL/L (ref 98–107)
CO2: 18 MMOL/L (ref 20–31)
CO2: 22 MMOL/L (ref 20–31)
CREAT SERPL-MCNC: 1.36 MG/DL (ref 0.5–0.9)
CREAT SERPL-MCNC: 1.49 MG/DL (ref 0.5–0.9)
DIFFERENTIAL TYPE: ABNORMAL
EKG ATRIAL RATE: 102 BPM
EKG ATRIAL RATE: 88 BPM
EKG ATRIAL RATE: 99 BPM
EKG P AXIS: 66 DEGREES
EKG P AXIS: 69 DEGREES
EKG P AXIS: 72 DEGREES
EKG P-R INTERVAL: 144 MS
EKG P-R INTERVAL: 156 MS
EKG P-R INTERVAL: 164 MS
EKG Q-T INTERVAL: 384 MS
EKG Q-T INTERVAL: 398 MS
EKG Q-T INTERVAL: 406 MS
EKG QRS DURATION: 86 MS
EKG QRS DURATION: 88 MS
EKG QRS DURATION: 90 MS
EKG QTC CALCULATION (BAZETT): 491 MS
EKG QTC CALCULATION (BAZETT): 492 MS
EKG QTC CALCULATION (BAZETT): 518 MS
EKG R AXIS: 22 DEGREES
EKG R AXIS: 33 DEGREES
EKG R AXIS: 6 DEGREES
EKG T AXIS: 43 DEGREES
EKG T AXIS: 48 DEGREES
EKG T AXIS: 50 DEGREES
EKG VENTRICULAR RATE: 102 BPM
EKG VENTRICULAR RATE: 88 BPM
EKG VENTRICULAR RATE: 99 BPM
EOSINOPHILS RELATIVE PERCENT: 4 % (ref 1–4)
GFR AFRICAN AMERICAN: 45 ML/MIN
GFR AFRICAN AMERICAN: 50 ML/MIN
GFR NON-AFRICAN AMERICAN: 37 ML/MIN
GFR NON-AFRICAN AMERICAN: 41 ML/MIN
GFR SERPL CREATININE-BSD FRML MDRD: ABNORMAL ML/MIN/{1.73_M2}
GLUCOSE BLD-MCNC: 189 MG/DL (ref 70–99)
GLUCOSE BLD-MCNC: 230 MG/DL (ref 65–105)
GLUCOSE BLD-MCNC: 353 MG/DL (ref 65–105)
GLUCOSE BLD-MCNC: 383 MG/DL (ref 65–105)
GLUCOSE BLD-MCNC: 466 MG/DL (ref 65–105)
GLUCOSE BLD-MCNC: 484 MG/DL (ref 70–99)
HCT VFR BLD CALC: 36.1 % (ref 36.3–47.1)
HEMOGLOBIN: 11.8 G/DL (ref 11.9–15.1)
IMMATURE GRANULOCYTES: 0 %
LYMPHOCYTES # BLD: 32 % (ref 24–43)
MCH RBC QN AUTO: 32.4 PG (ref 25.2–33.5)
MCHC RBC AUTO-ENTMCNC: 32.7 G/DL (ref 28.4–34.8)
MCV RBC AUTO: 99.2 FL (ref 82.6–102.9)
MONOCYTES # BLD: 11 % (ref 3–12)
NRBC AUTOMATED: 0 PER 100 WBC
PDW BLD-RTO: 12 % (ref 11.8–14.4)
PLATELET # BLD: 298 K/UL (ref 138–453)
PLATELET ESTIMATE: ABNORMAL
PMV BLD AUTO: 11.6 FL (ref 8.1–13.5)
POTASSIUM SERPL-SCNC: 3.9 MMOL/L (ref 3.7–5.3)
POTASSIUM SERPL-SCNC: 4.2 MMOL/L (ref 3.7–5.3)
RBC # BLD: 3.64 M/UL (ref 3.95–5.11)
RBC # BLD: ABNORMAL 10*6/UL
SEG NEUTROPHILS: 52 % (ref 36–65)
SEGMENTED NEUTROPHILS ABSOLUTE COUNT: 3.07 K/UL (ref 1.5–8.1)
SODIUM BLD-SCNC: 137 MMOL/L (ref 135–144)
SODIUM BLD-SCNC: 138 MMOL/L (ref 135–144)
TOTAL PROTEIN: 7 G/DL (ref 6.4–8.3)
TROPONIN INTERP: NORMAL
TROPONIN T: NORMAL NG/ML
TROPONIN, HIGH SENSITIVITY: 13 NG/L (ref 0–14)
WBC # BLD: 5.8 K/UL (ref 3.5–11.3)
WBC # BLD: ABNORMAL 10*3/UL

## 2019-02-22 PROCEDURE — 85025 COMPLETE CBC W/AUTO DIFF WBC: CPT

## 2019-02-22 PROCEDURE — 93005 ELECTROCARDIOGRAM TRACING: CPT

## 2019-02-22 PROCEDURE — G0108 DIAB MANAGE TRN  PER INDIV: HCPCS

## 2019-02-22 PROCEDURE — 94640 AIRWAY INHALATION TREATMENT: CPT

## 2019-02-22 PROCEDURE — 2580000003 HC RX 258: Performed by: EMERGENCY MEDICINE

## 2019-02-22 PROCEDURE — G0378 HOSPITAL OBSERVATION PER HR: HCPCS

## 2019-02-22 PROCEDURE — 6370000000 HC RX 637 (ALT 250 FOR IP): Performed by: EMERGENCY MEDICINE

## 2019-02-22 PROCEDURE — 80048 BASIC METABOLIC PNL TOTAL CA: CPT

## 2019-02-22 PROCEDURE — 6370000000 HC RX 637 (ALT 250 FOR IP): Performed by: STUDENT IN AN ORGANIZED HEALTH CARE EDUCATION/TRAINING PROGRAM

## 2019-02-22 PROCEDURE — 80053 COMPREHEN METABOLIC PANEL: CPT

## 2019-02-22 PROCEDURE — 36415 COLL VENOUS BLD VENIPUNCTURE: CPT

## 2019-02-22 PROCEDURE — 94760 N-INVAS EAR/PLS OXIMETRY 1: CPT

## 2019-02-22 PROCEDURE — 2580000003 HC RX 258: Performed by: STUDENT IN AN ORGANIZED HEALTH CARE EDUCATION/TRAINING PROGRAM

## 2019-02-22 PROCEDURE — 82947 ASSAY GLUCOSE BLOOD QUANT: CPT

## 2019-02-22 PROCEDURE — 84484 ASSAY OF TROPONIN QUANT: CPT

## 2019-02-22 RX ORDER — HYDROCODONE BITARTRATE AND ACETAMINOPHEN 5; 325 MG/1; MG/1
1 TABLET ORAL EVERY 6 HOURS PRN
Status: DISCONTINUED | OUTPATIENT
Start: 2019-02-22 | End: 2019-02-22 | Stop reason: HOSPADM

## 2019-02-22 RX ORDER — ACETAMINOPHEN 325 MG/1
650 TABLET ORAL EVERY 6 HOURS PRN
Status: DISCONTINUED | OUTPATIENT
Start: 2019-02-22 | End: 2019-02-22 | Stop reason: HOSPADM

## 2019-02-22 RX ORDER — HYDROCODONE BITARTRATE AND ACETAMINOPHEN 5; 325 MG/1; MG/1
2 TABLET ORAL EVERY 6 HOURS PRN
Status: DISCONTINUED | OUTPATIENT
Start: 2019-02-22 | End: 2019-02-22 | Stop reason: HOSPADM

## 2019-02-22 RX ADMIN — HYDROCODONE BITARTRATE AND ACETAMINOPHEN 2 TABLET: 5; 325 TABLET ORAL at 14:26

## 2019-02-22 RX ADMIN — INSULIN LISPRO 10 UNITS: 100 INJECTION, SOLUTION INTRAVENOUS; SUBCUTANEOUS at 11:43

## 2019-02-22 RX ADMIN — INSULIN LISPRO 6 UNITS: 100 INJECTION, SOLUTION INTRAVENOUS; SUBCUTANEOUS at 09:44

## 2019-02-22 RX ADMIN — ACETAMINOPHEN 650 MG: 325 TABLET ORAL at 05:32

## 2019-02-22 RX ADMIN — ALBUTEROL SULFATE 2 PUFF: 90 AEROSOL, METERED RESPIRATORY (INHALATION) at 08:08

## 2019-02-22 RX ADMIN — CITALOPRAM 40 MG: 20 TABLET, FILM COATED ORAL at 09:44

## 2019-02-22 RX ADMIN — Medication 10 ML: at 09:45

## 2019-02-22 RX ADMIN — SODIUM CHLORIDE: 9 INJECTION, SOLUTION INTRAVENOUS at 09:49

## 2019-02-22 RX ADMIN — ARIPIPRAZOLE 10 MG: 10 TABLET ORAL at 09:45

## 2019-02-22 RX ADMIN — INSULIN LISPRO 6 UNITS: 100 INJECTION, SOLUTION INTRAVENOUS; SUBCUTANEOUS at 18:22

## 2019-02-22 ASSESSMENT — PAIN SCALES - GENERAL
PAINLEVEL_OUTOF10: 9
PAINLEVEL_OUTOF10: 10

## 2019-02-24 ENCOUNTER — HOSPITAL ENCOUNTER (EMERGENCY)
Age: 54
Discharge: HOME | End: 2019-02-24
Attending: EMERGENCY MEDICINE
Payer: COMMERCIAL

## 2019-02-24 VITALS
WEIGHT: 220 LBS | TEMPERATURE: 97.3 F | RESPIRATION RATE: 16 BRPM | HEART RATE: 90 BPM | DIASTOLIC BLOOD PRESSURE: 93 MMHG | OXYGEN SATURATION: 98 % | SYSTOLIC BLOOD PRESSURE: 137 MMHG | BODY MASS INDEX: 35.36 KG/M2 | HEIGHT: 66 IN

## 2019-02-24 DIAGNOSIS — N93.9 VAGINAL BLEEDING: Primary | ICD-10-CM

## 2019-02-24 DIAGNOSIS — R73.9 HYPERGLYCEMIA: ICD-10-CM

## 2019-02-24 LAB
ABSOLUTE EOS #: 0.04 K/UL (ref 0–0.4)
ABSOLUTE IMMATURE GRANULOCYTE: ABNORMAL K/UL (ref 0–0.3)
ABSOLUTE LYMPH #: 1.4 K/UL (ref 1–4.8)
ABSOLUTE MONO #: 0.51 K/UL (ref 0.1–1.3)
ALBUMIN SERPL-MCNC: 4.3 G/DL (ref 3.5–5.2)
ALBUMIN/GLOBULIN RATIO: ABNORMAL (ref 1–2.5)
ALP BLD-CCNC: 100 U/L (ref 35–104)
ALT SERPL-CCNC: 9 U/L (ref 5–33)
ANION GAP SERPL CALCULATED.3IONS-SCNC: 12 MMOL/L (ref 9–17)
AST SERPL-CCNC: 11 U/L
BASOPHILS # BLD: 0 % (ref 0–2)
BASOPHILS ABSOLUTE: 0 K/UL (ref 0–0.2)
BILIRUB SERPL-MCNC: <0.15 MG/DL (ref 0.3–1.2)
BUN BLDV-MCNC: 14 MG/DL (ref 6–20)
BUN/CREAT BLD: ABNORMAL (ref 9–20)
CALCIUM SERPL-MCNC: 9.8 MG/DL (ref 8.6–10.4)
CHLORIDE BLD-SCNC: 96 MMOL/L (ref 98–107)
CO2: 25 MMOL/L (ref 20–31)
CREAT SERPL-MCNC: 0.76 MG/DL (ref 0.5–0.9)
DIFFERENTIAL TYPE: ABNORMAL
EOSINOPHILS RELATIVE PERCENT: 1 % (ref 0–4)
GFR AFRICAN AMERICAN: >60 ML/MIN
GFR NON-AFRICAN AMERICAN: >60 ML/MIN
GFR SERPL CREATININE-BSD FRML MDRD: ABNORMAL ML/MIN/{1.73_M2}
GFR SERPL CREATININE-BSD FRML MDRD: ABNORMAL ML/MIN/{1.73_M2}
GLUCOSE BLD-MCNC: 416 MG/DL (ref 70–99)
GLUCOSE BLD-MCNC: 417 MG/DL (ref 65–105)
HCT VFR BLD CALC: 38.1 % (ref 36–46)
HEMOGLOBIN: 12.5 G/DL (ref 12–16)
IMMATURE GRANULOCYTES: ABNORMAL %
LYMPHOCYTES # BLD: 36 % (ref 24–44)
MCH RBC QN AUTO: 32.1 PG (ref 26–34)
MCHC RBC AUTO-ENTMCNC: 32.8 G/DL (ref 31–37)
MCV RBC AUTO: 97.7 FL (ref 80–100)
MONOCYTES # BLD: 13 % (ref 1–7)
MORPHOLOGY: ABNORMAL
NRBC AUTOMATED: ABNORMAL PER 100 WBC
PDW BLD-RTO: 13 % (ref 11.5–14.9)
PLATELET # BLD: 319 K/UL (ref 150–450)
PLATELET ESTIMATE: ABNORMAL
PMV BLD AUTO: 8.4 FL (ref 6–12)
POTASSIUM SERPL-SCNC: 3.9 MMOL/L (ref 3.7–5.3)
RBC # BLD: 3.9 M/UL (ref 4–5.2)
RBC # BLD: ABNORMAL 10*6/UL
SEG NEUTROPHILS: 50 % (ref 36–66)
SEGMENTED NEUTROPHILS ABSOLUTE COUNT: 1.95 K/UL (ref 1.3–9.1)
SODIUM BLD-SCNC: 133 MMOL/L (ref 135–144)
TOTAL PROTEIN: 8.2 G/DL (ref 6.4–8.3)
WBC # BLD: 3.9 K/UL (ref 3.5–11)
WBC # BLD: ABNORMAL 10*3/UL

## 2019-02-24 PROCEDURE — 85025 COMPLETE CBC W/AUTO DIFF WBC: CPT

## 2019-02-24 PROCEDURE — 82947 ASSAY GLUCOSE BLOOD QUANT: CPT

## 2019-02-24 PROCEDURE — 36415 COLL VENOUS BLD VENIPUNCTURE: CPT

## 2019-02-24 PROCEDURE — 96372 THER/PROPH/DIAG INJ SC/IM: CPT

## 2019-02-24 PROCEDURE — 6370000000 HC RX 637 (ALT 250 FOR IP): Performed by: EMERGENCY MEDICINE

## 2019-02-24 PROCEDURE — 80053 COMPREHEN METABOLIC PANEL: CPT

## 2019-02-24 PROCEDURE — 99285 EMERGENCY DEPT VISIT HI MDM: CPT

## 2019-02-24 RX ADMIN — INSULIN HUMAN 10 UNITS: 100 INJECTION, SOLUTION PARENTERAL at 04:58

## 2019-02-24 ASSESSMENT — ENCOUNTER SYMPTOMS
SHORTNESS OF BREATH: 0
ABDOMINAL PAIN: 0
GASTROINTESTINAL NEGATIVE: 1
EYES NEGATIVE: 1
RESPIRATORY NEGATIVE: 1
BACK PAIN: 0

## 2019-02-24 ASSESSMENT — PAIN DESCRIPTION - PAIN TYPE: TYPE: CHRONIC PAIN

## 2019-03-30 ENCOUNTER — APPOINTMENT (OUTPATIENT)
Dept: GENERAL RADIOLOGY | Age: 54
End: 2019-03-30
Payer: COMMERCIAL

## 2019-03-30 ENCOUNTER — HOSPITAL ENCOUNTER (EMERGENCY)
Age: 54
Discharge: HOME | End: 2019-03-30
Attending: EMERGENCY MEDICINE
Payer: COMMERCIAL

## 2019-03-30 VITALS
HEART RATE: 130 BPM | OXYGEN SATURATION: 96 % | TEMPERATURE: 97.2 F | HEIGHT: 66 IN | BODY MASS INDEX: 35.36 KG/M2 | SYSTOLIC BLOOD PRESSURE: 186 MMHG | DIASTOLIC BLOOD PRESSURE: 105 MMHG | RESPIRATION RATE: 20 BRPM | WEIGHT: 220 LBS

## 2019-03-30 DIAGNOSIS — M54.5 RIGHT LOW BACK PAIN, UNSPECIFIED CHRONICITY, WITH SCIATICA PRESENCE UNSPECIFIED: ICD-10-CM
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
## 2019-03-30 DIAGNOSIS — M79.671 RIGHT FOOT PAIN: ICD-10-CM

## 2019-03-30 PROCEDURE — 6370000000 HC RX 637 (ALT 250 FOR IP): Performed by: EMERGENCY MEDICINE

## 2019-03-30 PROCEDURE — 99284 EMERGENCY DEPT VISIT MOD MDM: CPT

## 2019-03-30 PROCEDURE — 73630 X-RAY EXAM OF FOOT: CPT

## 2019-03-30 PROCEDURE — 73610 X-RAY EXAM OF ANKLE: CPT

## 2019-03-30 PROCEDURE — 73590 X-RAY EXAM OF LOWER LEG: CPT

## 2019-03-30 PROCEDURE — 96372 THER/PROPH/DIAG INJ SC/IM: CPT

## 2019-03-30 PROCEDURE — 6360000002 HC RX W HCPCS: Performed by: EMERGENCY MEDICINE

## 2019-03-30 PROCEDURE — 72100 X-RAY EXAM L-S SPINE 2/3 VWS: CPT

## 2019-03-30 RX ORDER — IBUPROFEN 800 MG/1
800 TABLET ORAL EVERY 8 HOURS PRN
Qty: 30 TABLET | Refills: 0 | Status: SHIPPED | OUTPATIENT
Start: 2019-03-30 | End: 2020-10-20

## 2019-03-30 RX ORDER — CYCLOBENZAPRINE HCL 10 MG
10 TABLET ORAL NIGHTLY PRN
Qty: 18 TABLET | Refills: 0 | Status: SHIPPED | OUTPATIENT
Start: 2019-03-30 | End: 2019-04-09

## 2019-03-30 RX ORDER — ORPHENADRINE CITRATE 30 MG/ML
60 INJECTION INTRAMUSCULAR; INTRAVENOUS ONCE
Status: COMPLETED | OUTPATIENT
Start: 2019-03-30 | End: 2019-03-30

## 2019-03-30 RX ORDER — ACETAMINOPHEN 500 MG
1000 TABLET ORAL ONCE
Status: COMPLETED | OUTPATIENT
Start: 2019-03-30 | End: 2019-03-30

## 2019-03-30 RX ADMIN — ORPHENADRINE CITRATE 60 MG: 30 INJECTION INTRAMUSCULAR; INTRAVENOUS at 21:39

## 2019-03-30 RX ADMIN — ACETAMINOPHEN 1000 MG: 500 TABLET ORAL at 21:39

## 2019-03-30 ASSESSMENT — ENCOUNTER SYMPTOMS
VOMITING: 0
SHORTNESS OF BREATH: 0
EYE PAIN: 0
CONSTIPATION: 0
DIARRHEA: 0
BACK PAIN: 1
NAUSEA: 0
RHINORRHEA: 0
COUGH: 0
ABDOMINAL PAIN: 0

## 2019-03-30 ASSESSMENT — PAIN SCALES - GENERAL
PAINLEVEL_OUTOF10: 9

## 2019-03-30 ASSESSMENT — PAIN DESCRIPTION - ORIENTATION: ORIENTATION: RIGHT

## 2019-03-30 ASSESSMENT — PAIN DESCRIPTION - PROGRESSION: CLINICAL_PROGRESSION: NOT CHANGED

## 2019-03-30 ASSESSMENT — PAIN DESCRIPTION - PAIN TYPE: TYPE: ACUTE PAIN

## 2019-03-30 ASSESSMENT — PAIN DESCRIPTION - LOCATION: LOCATION: BACK;LEG
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
## 2019-06-05 PROBLEM — Z80.0 FAMILY HISTORY OF COLON CANCER: Status: ACTIVE | Noted: 2018-06-29

## 2019-06-05 PROBLEM — Z12.31 SCREENING MAMMOGRAM, ENCOUNTER FOR: Status: ACTIVE | Noted: 2017-11-28

## 2019-06-05 PROBLEM — J96.02 ACUTE RESPIRATORY FAILURE WITH HYPERCAPNIA (HCC): Status: ACTIVE | Noted: 2018-05-21

## 2019-06-05 PROBLEM — K21.9 GASTROESOPHAGEAL REFLUX DISEASE: Status: ACTIVE | Noted: 2017-05-29

## 2019-06-05 PROBLEM — R45.851 SUICIDAL INTENT: Status: ACTIVE | Noted: 2017-03-10

## 2019-06-05 PROBLEM — F33.2 SEVERE RECURRENT MAJOR DEPRESSION WITHOUT PSYCHOTIC FEATURES (HCC): Status: ACTIVE | Noted: 2017-12-19

## 2019-06-05 PROBLEM — M89.9 RIB LESION: Status: ACTIVE | Noted: 2018-06-02

## 2019-06-05 PROBLEM — J01.00 ACUTE NON-RECURRENT MAXILLARY SINUSITIS: Status: ACTIVE | Noted: 2017-11-28

## 2019-06-05 PROBLEM — E55.9 VITAMIN D DEFICIENCY: Status: ACTIVE | Noted: 2017-11-28

## 2019-06-05 PROBLEM — J45.901 ASTHMA EXACERBATION ATTACKS: Status: ACTIVE | Noted: 2018-06-01

## 2019-06-05 PROBLEM — I63.9 STROKE (CEREBRUM) (HCC): Status: ACTIVE | Noted: 2017-06-14

## 2019-06-05 PROBLEM — R45.850 HOMICIDAL IDEATION: Status: ACTIVE | Noted: 2017-11-06

## 2019-06-05 PROBLEM — F12.20 CANNABIS USE DISORDER, SEVERE, DEPENDENCE (HCC): Status: ACTIVE | Noted: 2017-12-19

## 2019-06-05 PROBLEM — E78.5 DYSLIPIDEMIA: Status: ACTIVE | Noted: 2017-05-29

## 2019-06-05 PROBLEM — F33.3 SEVERE RECURRENT MAJOR DEPRESSION WITH PSYCHOTIC FEATURES (HCC): Status: ACTIVE | Noted: 2017-12-19

## 2019-06-05 PROBLEM — I95.9 HYPOTENSION: Status: ACTIVE | Noted: 2019-01-18

## 2019-06-05 PROBLEM — F43.10 POSTTRAUMATIC STRESS DISORDER: Status: ACTIVE | Noted: 2017-05-29

## 2019-06-05 PROBLEM — K92.2 UPPER GI BLEED: Status: ACTIVE | Noted: 2018-10-19

## 2019-06-05 PROBLEM — G62.9 NEUROPATHY: Status: ACTIVE | Noted: 2017-05-29

## 2019-06-05 PROBLEM — F33.9 RECURRENT DEPRESSION (HCC): Status: ACTIVE | Noted: 2017-05-29

## 2019-06-05 PROBLEM — F33.41 RECURRENT MAJOR DEPRESSIVE DISORDER, IN PARTIAL REMISSION (HCC): Status: ACTIVE | Noted: 2017-11-28

## 2019-06-05 PROBLEM — R42 DIZZINESS: Status: ACTIVE | Noted: 2017-06-13

## 2019-06-05 PROBLEM — F10.20 ALCOHOL USE DISORDER, SEVERE, DEPENDENCE (HCC): Status: ACTIVE | Noted: 2017-12-19

## 2019-07-05 PROBLEM — Z12.31 SCREENING MAMMOGRAM, ENCOUNTER FOR: Status: RESOLVED | Noted: 2017-11-28 | Resolved: 2019-07-05

## 2019-11-26 ENCOUNTER — HOSPITAL ENCOUNTER (OUTPATIENT)
Age: 54
Setting detail: SPECIMEN
Discharge: HOME | End: 2019-11-26
Payer: COMMERCIAL

## 2019-11-26 LAB
ANION GAP SERPL CALCULATED.3IONS-SCNC: 15 MMOL/L (ref 9–17)
BUN BLDV-MCNC: 22 MG/DL (ref 6–20)
BUN/CREAT BLD: 34 (ref 9–20)
C-REACTIVE PROTEIN: 0.9 MG/L (ref 0–5)
CALCIUM SERPL-MCNC: 9.9 MG/DL (ref 8.6–10.4)
CHLORIDE BLD-SCNC: 99 MMOL/L (ref 98–107)
CO2: 22 MMOL/L (ref 20–31)
CREAT SERPL-MCNC: 0.65 MG/DL (ref 0.5–0.9)
ESTIMATED AVERAGE GLUCOSE: 128 MG/DL
GFR AFRICAN AMERICAN: >60 ML/MIN
GFR NON-AFRICAN AMERICAN: >60 ML/MIN
GFR SERPL CREATININE-BSD FRML MDRD: ABNORMAL ML/MIN/{1.73_M2}
GFR SERPL CREATININE-BSD FRML MDRD: ABNORMAL ML/MIN/{1.73_M2}
GLUCOSE BLD-MCNC: 98 MG/DL (ref 70–99)
HBA1C MFR BLD: 6.1 % (ref 4–6)
HCT VFR BLD CALC: 40.2 % (ref 36.3–47.1)
HEMOGLOBIN: 12.4 G/DL (ref 11.9–15.1)
MAGNESIUM: 1.8 MG/DL (ref 1.6–2.6)
MCH RBC QN AUTO: 31.6 PG (ref 25.2–33.5)
MCHC RBC AUTO-ENTMCNC: 30.8 G/DL (ref 28.4–34.8)
MCV RBC AUTO: 102.6 FL (ref 82.6–102.9)
NRBC AUTOMATED: 0 PER 100 WBC
PDW BLD-RTO: 13.4 % (ref 11.8–14.4)
PLATELET # BLD: 334 K/UL (ref 138–453)
PMV BLD AUTO: 10.5 FL (ref 8.1–13.5)
POTASSIUM SERPL-SCNC: 4.2 MMOL/L (ref 3.7–5.3)
PREALBUMIN: 34.3 MG/DL (ref 20–40)
RBC # BLD: 3.92 M/UL (ref 3.95–5.11)
SEDIMENTATION RATE, ERYTHROCYTE: 59 MM (ref 0–20)
SODIUM BLD-SCNC: 136 MMOL/L (ref 135–144)
T4 TOTAL: 5.6 UG/DL (ref 4.5–12)
TSH SERPL DL<=0.05 MIU/L-ACNC: 2.72 MIU/L (ref 0.3–5)
WBC # BLD: 5.7 K/UL (ref 3.5–11.3)

## 2019-11-26 PROCEDURE — 86140 C-REACTIVE PROTEIN: CPT

## 2019-11-26 PROCEDURE — 80048 BASIC METABOLIC PNL TOTAL CA: CPT

## 2019-11-26 PROCEDURE — P9603 ONE-WAY ALLOW PRORATED MILES: HCPCS

## 2019-11-26 PROCEDURE — 83036 HEMOGLOBIN GLYCOSYLATED A1C: CPT

## 2019-11-26 PROCEDURE — 85651 RBC SED RATE NONAUTOMATED: CPT

## 2019-11-26 PROCEDURE — 84134 ASSAY OF PREALBUMIN: CPT

## 2019-11-26 PROCEDURE — 84436 ASSAY OF TOTAL THYROXINE: CPT

## 2019-11-26 PROCEDURE — 36415 COLL VENOUS BLD VENIPUNCTURE: CPT

## 2019-11-26 PROCEDURE — 84443 ASSAY THYROID STIM HORMONE: CPT

## 2019-11-26 PROCEDURE — 83735 ASSAY OF MAGNESIUM: CPT

## 2019-11-26 PROCEDURE — 85027 COMPLETE CBC AUTOMATED: CPT

## 2019-12-03 ENCOUNTER — HOSPITAL ENCOUNTER (OUTPATIENT)
Age: 54
Setting detail: SPECIMEN
Discharge: HOME | End: 2019-12-03
Payer: COMMERCIAL

## 2019-12-03 LAB
ANION GAP SERPL CALCULATED.3IONS-SCNC: 9 MMOL/L (ref 9–17)
BUN BLDV-MCNC: 20 MG/DL (ref 6–20)
BUN/CREAT BLD: 32 (ref 9–20)
C-REACTIVE PROTEIN: 1 MG/L (ref 0–5)
CALCIUM SERPL-MCNC: 9.2 MG/DL (ref 8.6–10.4)
CHLORIDE BLD-SCNC: 102 MMOL/L (ref 98–107)
CHOLESTEROL/HDL RATIO: 2.8
CHOLESTEROL: 180 MG/DL
CO2: 27 MMOL/L (ref 20–31)
CREAT SERPL-MCNC: 0.63 MG/DL (ref 0.5–0.9)
GFR AFRICAN AMERICAN: >60 ML/MIN
GFR NON-AFRICAN AMERICAN: >60 ML/MIN
GFR SERPL CREATININE-BSD FRML MDRD: ABNORMAL ML/MIN/{1.73_M2}
GFR SERPL CREATININE-BSD FRML MDRD: ABNORMAL ML/MIN/{1.73_M2}
GLUCOSE BLD-MCNC: 128 MG/DL (ref 70–99)
HCT VFR BLD CALC: 36.5 % (ref 36.3–47.1)
HDLC SERPL-MCNC: 64 MG/DL
HEMOGLOBIN: 11.6 G/DL (ref 11.9–15.1)
LDL CHOLESTEROL: 92 MG/DL (ref 0–130)
MCH RBC QN AUTO: 31.4 PG (ref 25.2–33.5)
MCHC RBC AUTO-ENTMCNC: 31.8 G/DL (ref 28.4–34.8)
MCV RBC AUTO: 98.9 FL (ref 82.6–102.9)
NRBC AUTOMATED: 0 PER 100 WBC
PDW BLD-RTO: 12.9 % (ref 11.8–14.4)
PLATELET # BLD: 262 K/UL (ref 138–453)
PMV BLD AUTO: 10.3 FL (ref 8.1–13.5)
POTASSIUM SERPL-SCNC: 4.2 MMOL/L (ref 3.7–5.3)
RBC # BLD: 3.69 M/UL (ref 3.95–5.11)
SODIUM BLD-SCNC: 138 MMOL/L (ref 135–144)
TRIGL SERPL-MCNC: 120 MG/DL
VLDLC SERPL CALC-MCNC: NORMAL MG/DL (ref 1–30)
WBC # BLD: 5 K/UL (ref 3.5–11.3)

## 2019-12-03 PROCEDURE — 36415 COLL VENOUS BLD VENIPUNCTURE: CPT

## 2019-12-03 PROCEDURE — P9603 ONE-WAY ALLOW PRORATED MILES: HCPCS

## 2019-12-03 PROCEDURE — 80048 BASIC METABOLIC PNL TOTAL CA: CPT

## 2019-12-03 PROCEDURE — 85027 COMPLETE CBC AUTOMATED: CPT

## 2019-12-03 PROCEDURE — 86140 C-REACTIVE PROTEIN: CPT

## 2019-12-03 PROCEDURE — 80061 LIPID PANEL: CPT

## 2019-12-09 ENCOUNTER — HOSPITAL ENCOUNTER (OUTPATIENT)
Age: 54
Setting detail: SPECIMEN
Discharge: HOME | End: 2019-12-09
Payer: COMMERCIAL

## 2019-12-09 LAB
ANION GAP SERPL CALCULATED.3IONS-SCNC: 12 MMOL/L (ref 9–17)
BUN BLDV-MCNC: 28 MG/DL (ref 6–20)
BUN/CREAT BLD: 32 (ref 9–20)
C-REACTIVE PROTEIN: 1.2 MG/L (ref 0–5)
CALCIUM SERPL-MCNC: 9 MG/DL (ref 8.6–10.4)
CHLORIDE BLD-SCNC: 103 MMOL/L (ref 98–107)
CHOLESTEROL/HDL RATIO: 2.5
CHOLESTEROL: 149 MG/DL
CO2: 24 MMOL/L (ref 20–31)
CREAT SERPL-MCNC: 0.87 MG/DL (ref 0.5–0.9)
GFR AFRICAN AMERICAN: >60 ML/MIN
GFR NON-AFRICAN AMERICAN: >60 ML/MIN
GFR SERPL CREATININE-BSD FRML MDRD: ABNORMAL ML/MIN/{1.73_M2}
GFR SERPL CREATININE-BSD FRML MDRD: ABNORMAL ML/MIN/{1.73_M2}
GLUCOSE BLD-MCNC: 134 MG/DL (ref 70–99)
HCT VFR BLD CALC: 34 % (ref 36.3–47.1)
HDLC SERPL-MCNC: 59 MG/DL
HEMOGLOBIN: 10.7 G/DL (ref 11.9–15.1)
LDL CHOLESTEROL: 70 MG/DL (ref 0–130)
MCH RBC QN AUTO: 31.6 PG (ref 25.2–33.5)
MCHC RBC AUTO-ENTMCNC: 31.5 G/DL (ref 28.4–34.8)
MCV RBC AUTO: 100.3 FL (ref 82.6–102.9)
NRBC AUTOMATED: 0 PER 100 WBC
PDW BLD-RTO: 12.8 % (ref 11.8–14.4)
PLATELET # BLD: 211 K/UL (ref 138–453)
PMV BLD AUTO: 10.5 FL (ref 8.1–13.5)
POTASSIUM SERPL-SCNC: 4.1 MMOL/L (ref 3.7–5.3)
RBC # BLD: 3.39 M/UL (ref 3.95–5.11)
SODIUM BLD-SCNC: 139 MMOL/L (ref 135–144)
TRIGL SERPL-MCNC: 102 MG/DL
VLDLC SERPL CALC-MCNC: NORMAL MG/DL (ref 1–30)
WBC # BLD: 4.2 K/UL (ref 3.5–11.3)

## 2019-12-09 PROCEDURE — 36415 COLL VENOUS BLD VENIPUNCTURE: CPT

## 2019-12-09 PROCEDURE — 80048 BASIC METABOLIC PNL TOTAL CA: CPT

## 2019-12-09 PROCEDURE — P9603 ONE-WAY ALLOW PRORATED MILES: HCPCS

## 2019-12-09 PROCEDURE — 85027 COMPLETE CBC AUTOMATED: CPT

## 2019-12-09 PROCEDURE — 80061 LIPID PANEL: CPT

## 2019-12-09 PROCEDURE — 86140 C-REACTIVE PROTEIN: CPT

## 2019-12-16 ENCOUNTER — HOSPITAL ENCOUNTER (OUTPATIENT)
Age: 54
Setting detail: SPECIMEN
Discharge: HOME | End: 2019-12-16
Payer: COMMERCIAL

## 2019-12-16 LAB
ALBUMIN SERPL-MCNC: 3.8 G/DL (ref 3.5–5.2)
ALBUMIN/GLOBULIN RATIO: ABNORMAL (ref 1–2.5)
ALP BLD-CCNC: 88 U/L (ref 35–104)
ALT SERPL-CCNC: 5 U/L (ref 5–33)
ANION GAP SERPL CALCULATED.3IONS-SCNC: 11 MMOL/L (ref 9–17)
AST SERPL-CCNC: 14 U/L
BILIRUB SERPL-MCNC: <0.1 MG/DL (ref 0.3–1.2)
BUN BLDV-MCNC: 14 MG/DL (ref 6–20)
BUN/CREAT BLD: 23 (ref 9–20)
C-REACTIVE PROTEIN: 1.1 MG/L (ref 0–5)
CALCIUM SERPL-MCNC: 9.4 MG/DL (ref 8.6–10.4)
CHLORIDE BLD-SCNC: 102 MMOL/L (ref 98–107)
CO2: 25 MMOL/L (ref 20–31)
CREAT SERPL-MCNC: 0.61 MG/DL (ref 0.5–0.9)
GFR AFRICAN AMERICAN: >60 ML/MIN
GFR NON-AFRICAN AMERICAN: >60 ML/MIN
GFR SERPL CREATININE-BSD FRML MDRD: ABNORMAL ML/MIN/{1.73_M2}
GFR SERPL CREATININE-BSD FRML MDRD: ABNORMAL ML/MIN/{1.73_M2}
GLUCOSE BLD-MCNC: 84 MG/DL (ref 70–99)
HCT VFR BLD CALC: 33.9 % (ref 36.3–47.1)
HEMOGLOBIN: 10.7 G/DL (ref 11.9–15.1)
MCH RBC QN AUTO: 31.2 PG (ref 25.2–33.5)
MCHC RBC AUTO-ENTMCNC: 31.6 G/DL (ref 28.4–34.8)
MCV RBC AUTO: 98.8 FL (ref 82.6–102.9)
NRBC AUTOMATED: 0 PER 100 WBC
PDW BLD-RTO: 12.6 % (ref 11.8–14.4)
PLATELET # BLD: 238 K/UL (ref 138–453)
PMV BLD AUTO: 9.9 FL (ref 8.1–13.5)
POTASSIUM SERPL-SCNC: 4.4 MMOL/L (ref 3.7–5.3)
RBC # BLD: 3.43 M/UL (ref 3.95–5.11)
SEDIMENTATION RATE, ERYTHROCYTE: 40 MM (ref 0–20)
SODIUM BLD-SCNC: 138 MMOL/L (ref 135–144)
TOTAL PROTEIN: 7.2 G/DL (ref 6.4–8.3)
URIC ACID: 4.6 MG/DL (ref 2.4–5.7)
WBC # BLD: 3.7 K/UL (ref 3.5–11.3)

## 2019-12-16 PROCEDURE — 36415 COLL VENOUS BLD VENIPUNCTURE: CPT

## 2019-12-16 PROCEDURE — 85651 RBC SED RATE NONAUTOMATED: CPT

## 2019-12-16 PROCEDURE — P9603 ONE-WAY ALLOW PRORATED MILES: HCPCS

## 2019-12-16 PROCEDURE — 86140 C-REACTIVE PROTEIN: CPT

## 2019-12-16 PROCEDURE — 84550 ASSAY OF BLOOD/URIC ACID: CPT

## 2019-12-16 PROCEDURE — 80053 COMPREHEN METABOLIC PANEL: CPT

## 2019-12-16 PROCEDURE — 85027 COMPLETE CBC AUTOMATED: CPT

## 2019-12-18 ENCOUNTER — HOSPITAL ENCOUNTER (OUTPATIENT)
Age: 54
Setting detail: SPECIMEN
Discharge: HOME | End: 2019-12-18
Payer: COMMERCIAL

## 2019-12-18 LAB
ALBUMIN SERPL-MCNC: 4.3 G/DL (ref 3.5–5.2)
ALBUMIN/GLOBULIN RATIO: ABNORMAL (ref 1–2.5)
ALP BLD-CCNC: 88 U/L (ref 35–104)
ALT SERPL-CCNC: <5 U/L (ref 5–33)
ANION GAP SERPL CALCULATED.3IONS-SCNC: 12 MMOL/L (ref 9–17)
AST SERPL-CCNC: 12 U/L
BILIRUB SERPL-MCNC: 0.11 MG/DL (ref 0.3–1.2)
BUN BLDV-MCNC: 17 MG/DL (ref 6–20)
BUN/CREAT BLD: 22 (ref 9–20)
C-REACTIVE PROTEIN: 1.2 MG/L (ref 0–5)
CALCIUM SERPL-MCNC: 9.5 MG/DL (ref 8.6–10.4)
CHLORIDE BLD-SCNC: 103 MMOL/L (ref 98–107)
CHOLESTEROL/HDL RATIO: 2.6
CHOLESTEROL: 190 MG/DL
CO2: 25 MMOL/L (ref 20–31)
CREAT SERPL-MCNC: 0.77 MG/DL (ref 0.5–0.9)
GFR AFRICAN AMERICAN: >60 ML/MIN
GFR NON-AFRICAN AMERICAN: >60 ML/MIN
GFR SERPL CREATININE-BSD FRML MDRD: ABNORMAL ML/MIN/{1.73_M2}
GFR SERPL CREATININE-BSD FRML MDRD: ABNORMAL ML/MIN/{1.73_M2}
GLUCOSE BLD-MCNC: 119 MG/DL (ref 70–99)
HCT VFR BLD CALC: 37.5 % (ref 36.3–47.1)
HDLC SERPL-MCNC: 72 MG/DL
HEMOGLOBIN: 11.9 G/DL (ref 11.9–15.1)
LDL CHOLESTEROL: 100 MG/DL (ref 0–130)
MCH RBC QN AUTO: 31.2 PG (ref 25.2–33.5)
MCHC RBC AUTO-ENTMCNC: 31.7 G/DL (ref 28.4–34.8)
MCV RBC AUTO: 98.2 FL (ref 82.6–102.9)
NRBC AUTOMATED: 0 PER 100 WBC
PDW BLD-RTO: 12.7 % (ref 11.8–14.4)
PLATELET # BLD: 276 K/UL (ref 138–453)
PMV BLD AUTO: 10.1 FL (ref 8.1–13.5)
POTASSIUM SERPL-SCNC: 4.5 MMOL/L (ref 3.7–5.3)
RBC # BLD: 3.82 M/UL (ref 3.95–5.11)
SODIUM BLD-SCNC: 140 MMOL/L (ref 135–144)
TOTAL PROTEIN: 7.9 G/DL (ref 6.4–8.3)
TRIGL SERPL-MCNC: 90 MG/DL
VLDLC SERPL CALC-MCNC: NORMAL MG/DL (ref 1–30)
WBC # BLD: 5.1 K/UL (ref 3.5–11.3)

## 2019-12-18 PROCEDURE — 80053 COMPREHEN METABOLIC PANEL: CPT

## 2019-12-18 PROCEDURE — 80061 LIPID PANEL: CPT

## 2019-12-18 PROCEDURE — 85027 COMPLETE CBC AUTOMATED: CPT

## 2019-12-18 PROCEDURE — 36415 COLL VENOUS BLD VENIPUNCTURE: CPT

## 2019-12-18 PROCEDURE — 86140 C-REACTIVE PROTEIN: CPT

## 2019-12-18 PROCEDURE — P9603 ONE-WAY ALLOW PRORATED MILES: HCPCS

## 2019-12-19 ENCOUNTER — HOSPITAL ENCOUNTER (OUTPATIENT)
Age: 54
Setting detail: SPECIMEN
Discharge: HOME | End: 2019-12-19
Payer: COMMERCIAL

## 2019-12-23 ENCOUNTER — HOSPITAL ENCOUNTER (OUTPATIENT)
Age: 54
Setting detail: SPECIMEN
Discharge: HOME | End: 2019-12-23
Payer: COMMERCIAL

## 2019-12-23 LAB
ALBUMIN SERPL-MCNC: 4.2 G/DL (ref 3.5–5.2)
ALBUMIN/GLOBULIN RATIO: ABNORMAL (ref 1–2.5)
ALP BLD-CCNC: 98 U/L (ref 35–104)
ALT SERPL-CCNC: <5 U/L (ref 5–33)
ANION GAP SERPL CALCULATED.3IONS-SCNC: 12 MMOL/L (ref 9–17)
AST SERPL-CCNC: 15 U/L
BILIRUB SERPL-MCNC: 0.12 MG/DL (ref 0.3–1.2)
BUN BLDV-MCNC: 17 MG/DL (ref 6–20)
BUN/CREAT BLD: 25 (ref 9–20)
C-REACTIVE PROTEIN: 2.7 MG/L (ref 0–5)
CALCIUM SERPL-MCNC: 9.5 MG/DL (ref 8.6–10.4)
CHLORIDE BLD-SCNC: 103 MMOL/L (ref 98–107)
CO2: 26 MMOL/L (ref 20–31)
CREAT SERPL-MCNC: 0.69 MG/DL (ref 0.5–0.9)
GFR AFRICAN AMERICAN: >60 ML/MIN
GFR NON-AFRICAN AMERICAN: >60 ML/MIN
GFR SERPL CREATININE-BSD FRML MDRD: ABNORMAL ML/MIN/{1.73_M2}
GFR SERPL CREATININE-BSD FRML MDRD: ABNORMAL ML/MIN/{1.73_M2}
GLUCOSE BLD-MCNC: 164 MG/DL (ref 70–99)
HCT VFR BLD CALC: 35.6 % (ref 36.3–47.1)
HEMOGLOBIN: 11.1 G/DL (ref 11.9–15.1)
MCH RBC QN AUTO: 31.8 PG (ref 25.2–33.5)
MCHC RBC AUTO-ENTMCNC: 31.2 G/DL (ref 28.4–34.8)
MCV RBC AUTO: 102 FL (ref 82.6–102.9)
NRBC AUTOMATED: ABNORMAL PER 100 WBC
PDW BLD-RTO: 12.8 % (ref 11.8–14.4)
PLATELET # BLD: 305 K/UL (ref 138–453)
PMV BLD AUTO: 10.3 FL (ref 8.1–13.5)
POTASSIUM SERPL-SCNC: 4.4 MMOL/L (ref 3.7–5.3)
RBC # BLD: 3.49 M/UL (ref 3.95–5.11)
SODIUM BLD-SCNC: 141 MMOL/L (ref 135–144)
TOTAL PROTEIN: 8 G/DL (ref 6.4–8.3)
WBC # BLD: 4.4 K/UL (ref 3.5–11.3)

## 2019-12-23 PROCEDURE — 80053 COMPREHEN METABOLIC PANEL: CPT

## 2019-12-23 PROCEDURE — 85027 COMPLETE CBC AUTOMATED: CPT

## 2019-12-23 PROCEDURE — P9603 ONE-WAY ALLOW PRORATED MILES: HCPCS

## 2019-12-23 PROCEDURE — 36415 COLL VENOUS BLD VENIPUNCTURE: CPT

## 2019-12-23 PROCEDURE — 86140 C-REACTIVE PROTEIN: CPT

## 2020-07-07 ENCOUNTER — HOSPITAL ENCOUNTER (OUTPATIENT)
Age: 55
Setting detail: OBSERVATION
Discharge: HOME | End: 2020-07-09
Attending: EMERGENCY MEDICINE | Admitting: EMERGENCY MEDICINE
Payer: COMMERCIAL

## 2020-07-07 ENCOUNTER — APPOINTMENT (OUTPATIENT)
Dept: GENERAL RADIOLOGY | Age: 55
End: 2020-07-07
Payer: COMMERCIAL

## 2020-07-07 ENCOUNTER — APPOINTMENT (OUTPATIENT)
Dept: CT IMAGING | Age: 55
End: 2020-07-07
Payer: COMMERCIAL

## 2020-07-07 PROBLEM — R29.898 LEG WEAKNESS, BILATERAL: Status: ACTIVE | Noted: 2020-07-07

## 2020-07-07 LAB
ABSOLUTE EOS #: 0.3 K/UL (ref 0–0.44)
ABSOLUTE IMMATURE GRANULOCYTE: <0.03 K/UL (ref 0–0.3)
ABSOLUTE LYMPH #: 2.75 K/UL (ref 1.1–3.7)
ABSOLUTE MONO #: 0.52 K/UL (ref 0.1–1.2)
ALBUMIN SERPL-MCNC: 4.2 G/DL (ref 3.5–5.2)
ALBUMIN/GLOBULIN RATIO: 1.2 (ref 1–2.5)
ALP BLD-CCNC: 93 U/L (ref 35–104)
ALT SERPL-CCNC: 11 U/L (ref 5–33)
ANION GAP SERPL CALCULATED.3IONS-SCNC: 16 MMOL/L (ref 9–17)
AST SERPL-CCNC: 13 U/L
BASOPHILS # BLD: 1 % (ref 0–2)
BASOPHILS ABSOLUTE: 0.08 K/UL (ref 0–0.2)
BILIRUB SERPL-MCNC: 0.26 MG/DL (ref 0.3–1.2)
BUN BLDV-MCNC: 19 MG/DL (ref 6–20)
BUN/CREAT BLD: ABNORMAL (ref 9–20)
CALCIUM SERPL-MCNC: 9.5 MG/DL (ref 8.6–10.4)
CHLORIDE BLD-SCNC: 99 MMOL/L (ref 98–107)
CHP ED QC CHECK: YES
CO2: 22 MMOL/L (ref 20–31)
CREAT SERPL-MCNC: 1.02 MG/DL (ref 0.5–0.9)
DIFFERENTIAL TYPE: ABNORMAL
EOSINOPHILS RELATIVE PERCENT: 5 % (ref 1–4)
GFR AFRICAN AMERICAN: >60 ML/MIN
GFR NON-AFRICAN AMERICAN: 57 ML/MIN
GFR SERPL CREATININE-BSD FRML MDRD: ABNORMAL ML/MIN/{1.73_M2}
GFR SERPL CREATININE-BSD FRML MDRD: ABNORMAL ML/MIN/{1.73_M2}
GLUCOSE BLD-MCNC: 235 MG/DL (ref 65–105)
GLUCOSE BLD-MCNC: 331 MG/DL
GLUCOSE BLD-MCNC: 331 MG/DL (ref 65–105)
GLUCOSE BLD-MCNC: 380 MG/DL (ref 65–105)
GLUCOSE BLD-MCNC: 419 MG/DL (ref 65–105)
GLUCOSE BLD-MCNC: 428 MG/DL (ref 70–99)
HCT VFR BLD CALC: 37.7 % (ref 36.3–47.1)
HEMOGLOBIN: 12.4 G/DL (ref 11.9–15.1)
IMMATURE GRANULOCYTES: 0 %
LIPASE: 52 U/L (ref 13–60)
LYMPHOCYTES # BLD: 45 % (ref 24–43)
MCH RBC QN AUTO: 32.5 PG (ref 25.2–33.5)
MCHC RBC AUTO-ENTMCNC: 32.9 G/DL (ref 28.4–34.8)
MCV RBC AUTO: 99 FL (ref 82.6–102.9)
MONOCYTES # BLD: 9 % (ref 3–12)
NRBC AUTOMATED: 0 PER 100 WBC
PARTIAL THROMBOPLASTIN TIME: 22.4 SEC (ref 20.5–30.5)
PDW BLD-RTO: 11.7 % (ref 11.8–14.4)
PLATELET # BLD: 341 K/UL (ref 138–453)
PLATELET ESTIMATE: ABNORMAL
PMV BLD AUTO: 11.3 FL (ref 8.1–13.5)
POTASSIUM SERPL-SCNC: 4.1 MMOL/L (ref 3.7–5.3)
RBC # BLD: 3.81 M/UL (ref 3.95–5.11)
RBC # BLD: ABNORMAL 10*6/UL
SEG NEUTROPHILS: 40 % (ref 36–65)
SEGMENTED NEUTROPHILS ABSOLUTE COUNT: 2.47 K/UL (ref 1.5–8.1)
SODIUM BLD-SCNC: 137 MMOL/L (ref 135–144)
TOTAL PROTEIN: 7.7 G/DL (ref 6.4–8.3)
WBC # BLD: 6.1 K/UL (ref 3.5–11.3)
WBC # BLD: ABNORMAL 10*3/UL

## 2020-07-07 PROCEDURE — 72125 CT NECK SPINE W/O DYE: CPT

## 2020-07-07 PROCEDURE — 6360000002 HC RX W HCPCS: Performed by: STUDENT IN AN ORGANIZED HEALTH CARE EDUCATION/TRAINING PROGRAM

## 2020-07-07 PROCEDURE — G0378 HOSPITAL OBSERVATION PER HR: HCPCS

## 2020-07-07 PROCEDURE — 2580000003 HC RX 258: Performed by: STUDENT IN AN ORGANIZED HEALTH CARE EDUCATION/TRAINING PROGRAM

## 2020-07-07 PROCEDURE — 99285 EMERGENCY DEPT VISIT HI MDM: CPT

## 2020-07-07 PROCEDURE — 73590 X-RAY EXAM OF LOWER LEG: CPT

## 2020-07-07 PROCEDURE — 70450 CT HEAD/BRAIN W/O DYE: CPT

## 2020-07-07 PROCEDURE — 6370000000 HC RX 637 (ALT 250 FOR IP): Performed by: STUDENT IN AN ORGANIZED HEALTH CARE EDUCATION/TRAINING PROGRAM

## 2020-07-07 PROCEDURE — 83036 HEMOGLOBIN GLYCOSYLATED A1C: CPT

## 2020-07-07 PROCEDURE — 96372 THER/PROPH/DIAG INJ SC/IM: CPT

## 2020-07-07 PROCEDURE — 72131 CT LUMBAR SPINE W/O DYE: CPT

## 2020-07-07 PROCEDURE — 85025 COMPLETE CBC W/AUTO DIFF WBC: CPT

## 2020-07-07 PROCEDURE — 72128 CT CHEST SPINE W/O DYE: CPT

## 2020-07-07 PROCEDURE — 80053 COMPREHEN METABOLIC PANEL: CPT

## 2020-07-07 PROCEDURE — 82947 ASSAY GLUCOSE BLOOD QUANT: CPT

## 2020-07-07 PROCEDURE — 73502 X-RAY EXAM HIP UNI 2-3 VIEWS: CPT

## 2020-07-07 PROCEDURE — 81003 URINALYSIS AUTO W/O SCOPE: CPT

## 2020-07-07 PROCEDURE — 83690 ASSAY OF LIPASE: CPT

## 2020-07-07 PROCEDURE — 85730 THROMBOPLASTIN TIME PARTIAL: CPT

## 2020-07-07 RX ORDER — FENTANYL CITRATE 50 UG/ML
25 INJECTION, SOLUTION INTRAMUSCULAR; INTRAVENOUS ONCE
Status: DISCONTINUED | OUTPATIENT
Start: 2020-07-07 | End: 2020-07-09 | Stop reason: HOSPADM

## 2020-07-07 RX ORDER — HYDROCODONE BITARTRATE AND ACETAMINOPHEN 5; 325 MG/1; MG/1
2 TABLET ORAL EVERY 6 HOURS PRN
Status: DISCONTINUED | OUTPATIENT
Start: 2020-07-07 | End: 2020-07-08

## 2020-07-07 RX ORDER — GABAPENTIN 400 MG/1
400 CAPSULE ORAL 3 TIMES DAILY
Status: DISCONTINUED | OUTPATIENT
Start: 2020-07-07 | End: 2020-07-09 | Stop reason: HOSPADM

## 2020-07-07 RX ORDER — SODIUM CHLORIDE 0.9 % (FLUSH) 0.9 %
10 SYRINGE (ML) INJECTION EVERY 12 HOURS SCHEDULED
Status: DISCONTINUED | OUTPATIENT
Start: 2020-07-07 | End: 2020-07-09 | Stop reason: HOSPADM

## 2020-07-07 RX ORDER — ACETAMINOPHEN 500 MG
1000 TABLET ORAL ONCE
Status: COMPLETED | OUTPATIENT
Start: 2020-07-07 | End: 2020-07-07

## 2020-07-07 RX ORDER — DEXTROSE MONOHYDRATE 25 G/50ML
12.5 INJECTION, SOLUTION INTRAVENOUS PRN
Status: DISCONTINUED | OUTPATIENT
Start: 2020-07-07 | End: 2020-07-09 | Stop reason: HOSPADM

## 2020-07-07 RX ORDER — ACETAMINOPHEN 325 MG/1
650 TABLET ORAL EVERY 4 HOURS PRN
Status: DISCONTINUED | OUTPATIENT
Start: 2020-07-07 | End: 2020-07-08

## 2020-07-07 RX ORDER — DEXTROSE MONOHYDRATE 50 MG/ML
100 INJECTION, SOLUTION INTRAVENOUS PRN
Status: DISCONTINUED | OUTPATIENT
Start: 2020-07-07 | End: 2020-07-09 | Stop reason: HOSPADM

## 2020-07-07 RX ORDER — CITALOPRAM 20 MG/1
40 TABLET ORAL DAILY
Status: DISCONTINUED | OUTPATIENT
Start: 2020-07-07 | End: 2020-07-09 | Stop reason: HOSPADM

## 2020-07-07 RX ORDER — ARIPIPRAZOLE 10 MG/1
10 TABLET ORAL DAILY
Status: DISCONTINUED | OUTPATIENT
Start: 2020-07-07 | End: 2020-07-09 | Stop reason: HOSPADM

## 2020-07-07 RX ORDER — DICYCLOMINE HYDROCHLORIDE 10 MG/ML
20 INJECTION INTRAMUSCULAR ONCE
Status: COMPLETED | OUTPATIENT
Start: 2020-07-07 | End: 2020-07-07

## 2020-07-07 RX ORDER — SODIUM CHLORIDE 0.9 % (FLUSH) 0.9 %
10 SYRINGE (ML) INJECTION PRN
Status: DISCONTINUED | OUTPATIENT
Start: 2020-07-07 | End: 2020-07-09 | Stop reason: HOSPADM

## 2020-07-07 RX ORDER — NICOTINE POLACRILEX 4 MG
15 LOZENGE BUCCAL PRN
Status: DISCONTINUED | OUTPATIENT
Start: 2020-07-07 | End: 2020-07-09 | Stop reason: HOSPADM

## 2020-07-07 RX ORDER — SODIUM CHLORIDE, SODIUM LACTATE, POTASSIUM CHLORIDE, AND CALCIUM CHLORIDE .6; .31; .03; .02 G/100ML; G/100ML; G/100ML; G/100ML
1000 INJECTION, SOLUTION INTRAVENOUS ONCE
Status: COMPLETED | OUTPATIENT
Start: 2020-07-07 | End: 2020-07-07

## 2020-07-07 RX ADMIN — DICYCLOMINE HYDROCHLORIDE 20 MG: 10 INJECTION INTRAMUSCULAR at 06:51

## 2020-07-07 RX ADMIN — HYDROCODONE BITARTRATE AND ACETAMINOPHEN 2 TABLET: 5; 325 TABLET ORAL at 17:07

## 2020-07-07 RX ADMIN — TRAZODONE HYDROCHLORIDE 150 MG: 100 TABLET ORAL at 20:30

## 2020-07-07 RX ADMIN — ARIPIPRAZOLE 10 MG: 10 TABLET ORAL at 17:03

## 2020-07-07 RX ADMIN — INSULIN LISPRO 6 UNITS: 100 INJECTION, SOLUTION INTRAVENOUS; SUBCUTANEOUS at 07:28

## 2020-07-07 RX ADMIN — INSULIN LISPRO 2 UNITS: 100 INJECTION, SOLUTION INTRAVENOUS; SUBCUTANEOUS at 20:36

## 2020-07-07 RX ADMIN — ACETAMINOPHEN 1000 MG: 500 TABLET ORAL at 06:51

## 2020-07-07 RX ADMIN — GABAPENTIN 400 MG: 400 CAPSULE ORAL at 20:30

## 2020-07-07 RX ADMIN — INSULIN LISPRO 10 UNITS: 100 INJECTION, SOLUTION INTRAVENOUS; SUBCUTANEOUS at 17:03

## 2020-07-07 RX ADMIN — CITALOPRAM 40 MG: 20 TABLET, FILM COATED ORAL at 17:03

## 2020-07-07 RX ADMIN — SODIUM CHLORIDE, POTASSIUM CHLORIDE, SODIUM LACTATE AND CALCIUM CHLORIDE 1000 ML: 600; 310; 30; 20 INJECTION, SOLUTION INTRAVENOUS at 07:29

## 2020-07-07 RX ADMIN — GABAPENTIN 400 MG: 400 CAPSULE ORAL at 17:02

## 2020-07-07 ASSESSMENT — PAIN SCALES - GENERAL
PAINLEVEL_OUTOF10: 10

## 2020-07-07 ASSESSMENT — PAIN DESCRIPTION - PAIN TYPE
TYPE: ACUTE PAIN
TYPE: ACUTE PAIN

## 2020-07-07 ASSESSMENT — PAIN DESCRIPTION - LOCATION
LOCATION: ABDOMEN
LOCATION: ABDOMEN

## 2020-07-07 NOTE — ED TRIAGE NOTES
Pt to ED A&Ox4, c/o abd pain x2 days. Generalized pain, unable to point to a place that hurts the worst.   Pt has Lupus. Denies n/v/d. No other complaints.

## 2020-07-07 NOTE — ED PROVIDER NOTES
[facility] ED  eMERGENCY dEPARTMENT eNCOUnter   Attending Attestation     Pt Name: Gwendolyn Hernadez  MRN: [id number]  [place] 1965  Date of evaluation: 7/7/20       Gwendolyn Hernadez is a 50 y.o. female who presents with Abdominal Pain      History: Patient has been dealing with several days worth of abdominal pain. Patient says she has lupus. Patient says that she fell a few times in the last week several of them were from helping her nephew get up from falls. Patient says that she also has poor balance because of the toes that were removed of her left foot. Patient says that she is now noticing some abdominal pain that she is concerned is from the falls. Exam: Heart rate and rhythm are regular. Lungs are clear to auscultation bilaterally. Abdomen is soft, nontender. Patient moving all extremities. Patient is nontoxic-appearing. Patient is well-appearing. Plan for basic labs, abdominal labs, if negative plan for discharge. I performed a history and physical examination of the patient and discussed management with the resident. I reviewed the residents note and agree with the documented findings and plan of care. Any areas of disagreement are noted on the chart. I was personally present for the key portions of any procedures. I have documented in the chart those procedures where I was not present during the key portions. I have personally reviewed all images and agree with the resident's interpretation. I have reviewed the emergency nurses triage note. I agree with the chief complaint, past medical history, past surgical history, allergies, medications, social and family history as documented unless otherwise noted below. Documentation of the HPI, Physical Exam and Medical Decision Making performed by medical students or scribes is based on my personal performance of the HPI, PE and MDM.  For Phys Assistant/ Nurse Practitioner cases/documentation I have had a face to face evaluation of this patient and have completed at least one if not all key elements of the E/M (history, physical exam, and MDM). Additional findings are as noted. For APC cases I have personally evaluated and examined the patient in conjunction with the APC and agree with the treatment plan and disposition of the patient as recorded by the APC.     Angella Hill MD  Attending Emergency  Physician        Aniya Caruso MD  07/07/20 [id number]

## 2020-07-07 NOTE — ED NOTES
Dr. Deyanira Hardin and Dr. Gladys Crawford at bedside to reassess pt d/t pt stating that she cannot bear weight on her leg. Pt reported to Dr. Gladys Crawford that she would carmelita Gideon Laws 85 if she was DC'ed and \"something happens to me\". After evaluation, pt is not being DC'ed and will be admitted to OBS.      Javier Pruitt, RN  07/07/20 6678

## 2020-07-07 NOTE — ED NOTES
Patient given discharge instructions and expressed understanding. Patient requested to wait for family to come pick her up, writer informed patient that she can use the phone in the lobby to call to contact family. Writer left the room and heard patient yell shortly after, when writer entered the room, the patient was found on the ground. Patient states that her body hurts and denies LOC, but states that she may have hit her head.      [facility]  07/07/20 0709

## 2020-07-07 NOTE — ED PROVIDER NOTES
[facility]  FACULTY HANDOFF       Handoff taken on the following patient from prior Attending Physician:  Pt Name: John Paul Hadley  PCP:  No primary care provider on file. Attestation  I was available and discussed any additional care issues that arose and coordinated the management plans with the resident(s) caring for the patient during my duty period. Any areas of disagreement with resident's documentation of care or procedures are noted on the chart. I was personally present for the key portions of any/all procedures during my duty period. I have documented in the chart those procedures where I was not present during the key portions. CHIEF COMPLAINT       Chief Complaint   Patient presents with    Abdominal Pain         CURRENT MEDICATIONS     Previous Medications  Previous Medications    ACETAMINOPHEN (TYLENOL) 325 MG TABLET    Take 2 tablets by mouth every 6 hours as needed for Pain    ARIPIPRAZOLE (ABILIFY) 10 MG TABLET    Take 1 tablet by mouth daily    CITALOPRAM (CELEXA) 40 MG TABLET    Take 1 tablet by mouth daily    GABAPENTIN (NEURONTIN) 300 MG CAPSULE    Take 3 capsules by mouth 3 times daily for 30 days. IBUPROFEN (ADVIL;MOTRIN) 800 MG TABLET    Take 1 tablet by mouth every 8 hours as needed for Pain    METFORMIN (GLUCOPHAGE) 1000 MG TABLET    Take 1 tablet by mouth daily (with breakfast)    TRAZODONE (DESYREL) 150 MG TABLET    Take 1 tablet by mouth nightly       Encounter Medications  Orders Placed This Encounter   Medications    acetaminophen (TYLENOL) tablet 1,000 mg    dicyclomine (BENTYL) injection 20 mg    lactated ringers bolus    insulin lispro (HUMALOG) injection vial 0-6 Units       ALLERGIES     is allergic to bactrim [sulfamethoxazole-trimethoprim] and adhesive tape.       RECENT VITALS:   Temp: 97.5 °F (36.4 °C),  Pulse: 75, Resp: 15, BP: 113/73    RADIOLOGY:   No orders to display       LABS:  Labs Reviewed   COMPREHENSIVE METABOLIC PANEL W/ REFLEX TO MG FOR LOW K - Abnormal; Notable for the following components:       Result Value    Glucose 428 (*)     CREATININE 1.02 (*)     Total Bilirubin 0.26 (*)     GFR Non- 57 (*)     All other components within normal limits   CBC WITH AUTO DIFFERENTIAL - Abnormal; Notable for the following components:    RBC 3.81 (*)     RDW 11.7 (*)     Lymphocytes 45 (*)     Eosinophils % 5 (*)     All other components within normal limits   POC GLUCOSE FINGERSTICK - Abnormal; Notable for the following components:    POC Glucose 419 (*)     All other components within normal limits   LIPASE   APTT   URINE RT REFLEX TO CULTURE   POCT GLUCOSE           PLAN/ TASKS OUTSTANDING           (Please note that portions of this note were completed with a voice recognition program.  Efforts were made to edit the dictations but occasionally words are mis-transcribed.)    Marcano MD, F.A.C.E.P.   Attending Emergency Physician       Elia Coleman MD  07/07/20 2341

## 2020-07-07 NOTE — ED PROVIDER NOTES
[facility] [facility]  Emergency Department  Emergency Medicine Resident Sign-out     Care of Chacho Hernandez was assumed from Dr. Melba Hamlin and is being seen for Abdominal Pain  . The patient's initial evaluation and plan have been discussed with the prior provider who initially evaluated the patient. EMERGENCY DEPARTMENT COURSE / MEDICAL DECISION MAKING:       MEDICATIONS GIVEN:  Orders Placed This Encounter   Medications    acetaminophen (TYLENOL) tablet 1,000 mg    dicyclomine (BENTYL) injection 20 mg    lactated ringers bolus    insulin lispro (HUMALOG) injection vial 0-6 Units       LABS / RADIOLOGY:     Labs Reviewed   COMPREHENSIVE METABOLIC PANEL W/ REFLEX TO MG FOR LOW K - Abnormal; Notable for the following components:       Result Value    Glucose 428 (*)     CREATININE 1.02 (*)     Total Bilirubin 0.26 (*)     GFR Non- 57 (*)     All other components within normal limits   CBC WITH AUTO DIFFERENTIAL - Abnormal; Notable for the following components:    RBC 3.81 (*)     RDW 11.7 (*)     Lymphocytes 45 (*)     Eosinophils % 5 (*)     All other components within normal limits   POC GLUCOSE FINGERSTICK - Abnormal; Notable for the following components:    POC Glucose 419 (*)     All other components within normal limits   LIPASE   APTT   URINE RT REFLEX TO CULTURE   POCT GLUCOSE       No results found. RECENT VITALS:     Temp: 97.5 °F (36.4 °C),  Pulse: 75, Resp: 15, BP: 113/73, SpO2: 100 %    This patient is a 50 y.o. Female with diffuse abdominal pain for 2 days with hyperglycemia with known diabetes. Basic blood work revealed elevated blood sugar of in the 400s. Patient is eating 1 L of normal saline, and sliding scale insulin. Rechecking POC reevaluate. ED Course as of Jul 07 1754   Tue Jul 07, 2020   0825 Repeat POC glucose is 331.   Will evaluate patient at bedside.    [CS]   7857 Pt reevaluated found to be doing slightly better than upon presentation to the ER.  Patient notes that she ran of her metformin and was given prescription. Patient does not have a primary care provider and was given a list of walk-in clinics that she can follow-up with. Patient was agreeable with discharge plan. Patient was educated return precautions and all of her questions were answered. Patient discharged in good condition from the emergency department. [CS]   [id number] After patient was discharged from emergency department. Patient was getting out of bed with curtains closed and lights dimmed when she stated that she fell and says now she hit her head and is now having diffuse neck, back, head pain with right hip pain and left tibia pain. Patient also reports that she refill if she needs a taxicab to get her to her placed her pain. Go exam patient has no signs of bruising, 5/5 strength of all 4 extremities, with diffuse midline tenderness to palpation along with the right hip and left tibia. Patient has subjective paresthesias in the left lower extremity up to the midcalf is uncertain if that is new versus old neuropathy. Is also states that she is in so much pain she is having hard time moving her bilateral lower extremities. Will CT head and spine along with x-rays of the right hip and left tibia. [CS]   2348 Is reevaluated bedside after negative imaging. Patient was found to be able to ambulate though seems shaky on her feet. Patient became tearful and noted that she was afraid that she would fall she went back home. Patient reports that she is on certain if she still has paresthesias patient at her baseline. Patient was given the option to go to the observation unit for PT/OT and neurological evaluation. Patient agreed. Plan to move patient to the observation unit this time. [CS]      ED Course User Index  [CS] Lia Sewell,            OUTSTANDING TASKS / RECOMMENDATIONS:    1. Follow-up POC Glucose  2. Possible discharge. FINAL IMPRESSION:     1. Hyperglycemia    2. Generalized abdominal pain        DISPOSITION:         DISPOSITION:  []  Discharge   []  Transfer -    [x]  Admission -  Observeration   []  Against Medical Advice   []  Eloped   FOLLOW-UP: No follow-up provider specified.    DISCHARGE MEDICATIONS: New Prescriptions    No medications on file           Dash Trinidad DO  Emergency Medicine Resident  [address] [place]  Resident  07/07/20 8234

## 2020-07-07 NOTE — ED NOTES
Bed: 06  Expected date:   Expected time:   Means of arrival:   Comments:     Mateo Hagen, VINOD  07/07/20 6313

## 2020-07-07 NOTE — ED PROVIDER NOTES
[facility] ED  Emergency Department Encounter  EmergencyMedicine Resident     Pt Name:Terri Nolasco  MRN: [id number]  [place] 1965  Date of evaluation: 7/7/20  PCP:  No primary care provider on file. CHIEF COMPLAINT       Chief Complaint   Patient presents with    Abdominal Pain       HISTORY OF PRESENT ILLNESS  (Location/Symptom, Timing/Onset, Context/Setting, Quality, Duration, Modifying Factors, Severity.)      Ruthine Spurling is a 50 y.o. female who presents with PMHx of lupus here for diffuse generalized abdominal pain for 2 days without nausea/vomiting or diarrhea. Admits to multiple falls over the last week, all mechanical without loss of consciousness due to missing toes. PAST MEDICAL / SURGICAL / SOCIAL / FAMILY HISTORY      has a past medical history of Acid reflux, Acute cystitis with hematuria, Acute non-recurrent maxillary sinusitis, Asthma, Bipolar 1 disorder ([address] Bipolar disorder, mixed ([address] BMI 34.0-34.9,adult, Cannabis use disorder, severe, dependence ([address] Cerebrovascular accident (CVA) ([address] Chest pain, Chronic renal insufficiency, Cocaine abuse ([address] DDD (degenerative disc disease), cervical, Diabetes mellitus ([address] Dizziness, Fibromyalgia, History of stroke, Homicidal ideation, Hyperglycemia, Hypertension, Hypotension, IDDM (insulin dependent diabetes mellitus) ([address] Lupus ([address] Migraine, Neuropathy, Neuropathy, Polysubstance abuse ([address] Post traumatic stress disorder (PTSD), Posttraumatic stress disorder, Recurrent depression ([address] Recurrent major depressive disorder, in partial remission ([address] Screening mammogram, encounter for, Severe recurrent major depression with psychotic features ([address] Severe recurrent major depression without psychotic features ([address] Stroke (cerebrum) ([address] Stroke ([address] Suicidal ideation, Suicidal intent, Vitamin D deficiency, and White matter changes.   Reviewed, no new additions      has a past surgical history tablet 1,000 mg    dicyclomine (BENTYL) injection 20 mg    lactated ringers bolus    insulin lispro (HUMALOG) injection vial 0-6 Units       DDX: Strain/spasm of abdominal muscle,  Gastroenteritis, PUD, irritable bowel syndrome, UTI, Kidney stones, hyperglycemia    DIAGNOSTIC RESULTS / EMERGENCY DEPARTMENT COURSE / MDM   LAB RESULTS:  Results for orders placed or performed during the hospital encounter of 07/07/20   Lipase   Result Value Ref Range    Lipase 52 13 - 60 U/L   Comprehensive Metabolic Panel w/ Reflex to MG   Result Value Ref Range    Glucose 428 (HH) 70 - 99 mg/dL    BUN 19 6 - 20 mg/dL    CREATININE 1.02 (H) 0.50 - 0.90 mg/dL    Bun/Cre Ratio NOT REPORTED 9 - 20    Calcium 9.5 8.6 - 10.4 mg/dL    Sodium 137 135 - 144 mmol/L    Potassium 4.1 3.7 - 5.3 mmol/L    Chloride 99 98 - 107 mmol/L    CO2 22 20 - 31 mmol/L    Anion Gap 16 9 - 17 mmol/L    Alkaline Phosphatase 93 35 - 104 U/L    ALT 11 5 - 33 U/L    AST 13 <32 U/L    Total Bilirubin 0.26 (L) 0.3 - 1.2 mg/dL    Total Protein 7.7 6.4 - 8.3 g/dL    Alb 4.2 3.5 - 5.2 g/dL    Albumin/Globulin Ratio 1.2 1.0 - 2.5    GFR Non- 57 (L) >60 mL/min    GFR African American >60 >60 mL/min    GFR Comment          GFR Staging NOT REPORTED    CBC Auto Differential   Result Value Ref Range    WBC 6.1 3.5 - 11.3 k/uL    RBC 3.81 (L) 3.95 - 5.11 m/uL    Hemoglobin 12.4 11.9 - 15.1 g/dL    Hematocrit 37.7 36.3 - 47.1 %    MCV 99.0 82.6 - 102.9 fL    MCH 32.5 25.2 - 33.5 pg    MCHC 32.9 28.4 - 34.8 g/dL    RDW 11.7 (L) 11.8 - 14.4 %    Platelets 228 493 - 885 k/uL    MPV 11.3 8.1 - 13.5 fL    NRBC Automated 0.0 0.0 per 100 WBC    Differential Type NOT REPORTED     Seg Neutrophils 40 36 - 65 %    Lymphocytes 45 (H) 24 - 43 %    Monocytes 9 3 - 12 %    Eosinophils % 5 (H) 1 - 4 %    Basophils 1 0 - 2 %    Immature Granulocytes 0 0 %    Segs Absolute 2.47 1.50 - 8.10 k/uL    Absolute Lymph # 2.75 1.10 - 3.70 k/uL    Absolute Mono # 0.52 0.10 - 1.20 k/uL 514

## 2020-07-07 NOTE — CARE COORDINATION
Case Management Initial Discharge Plan  Terri Palmer,             Met with:patient to discuss discharge plans. Information verified: address, contacts, phone number, , insurance Yes    Emergency Contact/Next of Kin name & number: Avis Kay daughter [phone]    PCP: [facility] pt reymundo recall her name Date of last visit: 5 months ago    Insurance Provider: Eric    Discharge Planning    Living Arrangements:  Alone(daughter at times)   Support Systems:  Family Members    Home has 1 stories  10 stairs to climb to get into front door, 0 stairs to climb to reach second floor  Location of bedroom/bathroom in home main    Patient able to perform ADL's:Independent    Current Services (outpatient & in home) none  DME equipment: walker and cane   DME provider:     Receiving oral anticoagulation therapy? No    If indicated:   Physician managing anticoagulation treatment: none  Where does patient obtain lab work for ATC treatment? n/a      Potential Assistance Needed:       Patient agreeable to home care: No  [place]oy of choice provided:  n/a    Prior SNF/Rehab Placement and Facility: Pt reymundo recall name on [address] 1 year ago  Agreeable to SNF/Rehab: No  Coy of choice provided: n/a     Evaluation: no    Expected Discharge date:       Patient expects to be discharged to:  home  Follow Up Appointment: Best Day/ Time:      Transportation provider: self when able  Transportation arrangements needed for discharge: yes pt is afraid to drive right now    Readmission Risk              Risk of Unplanned Readmission:        0             Does patient have a readmission risk score greater than 14?: not calculated  If yes, follow-up appointment must be made within 7 days of discharge. Goals of Care: To stop falling       Discharge Plan: Plans to return home independently, does not have transportation home she is afraid to drive right now, pcp is established however she cannot remember her name she is at [facility]          Electronically signed by Dior Quiroz RN on 7/7/20 at 1:08 PM EDT

## 2020-07-08 LAB
-: NORMAL
ALBUMIN (CALCULATED): 3.2 G/DL (ref 3.2–5.2)
ALBUMIN PERCENT: 54 % (ref 45–65)
ALPHA 1 PERCENT: 2 % (ref 3–6)
ALPHA 2 PERCENT: 16 % (ref 6–13)
ALPHA-1-GLOBULIN: 0.1 G/DL (ref 0.1–0.4)
ALPHA-2-GLOBULIN: 0.9 G/DL (ref 0.5–0.9)
BETA GLOBULIN: 0.8 G/DL (ref 0.5–1.1)
BETA PERCENT: 14 % (ref 11–19)
BILIRUBIN URINE: NEGATIVE
C-REACTIVE PROTEIN: 1 MG/L (ref 0–5)
COLOR: YELLOW
COMMENT UA: ABNORMAL
ESTIMATED AVERAGE GLUCOSE: 392 MG/DL
FOLATE: >20 NG/ML
GAMMA GLOBULIN %: 14 % (ref 9–20)
GAMMA GLOBULIN: 0.8 G/DL (ref 0.5–1.5)
GLUCOSE BLD-MCNC: 124 MG/DL (ref 65–105)
GLUCOSE BLD-MCNC: 261 MG/DL (ref 65–105)
GLUCOSE BLD-MCNC: 321 MG/DL (ref 65–105)
GLUCOSE BLD-MCNC: 358 MG/DL (ref 65–105)
GLUCOSE URINE: ABNORMAL
HBA1C MFR BLD: 15.3 % (ref 4–6)
KETONES, URINE: ABNORMAL
LEUKOCYTE ESTERASE, URINE: NEGATIVE
NITRITE, URINE: NEGATIVE
PATHOLOGIST: ABNORMAL
PH UA: 5 (ref 5–8)
PROTEIN ELECTROPHORESIS, SERUM: ABNORMAL
PROTEIN UA: NEGATIVE
REASON FOR REJECTION: NORMAL
SEDIMENTATION RATE, ERYTHROCYTE: 36 MM (ref 0–30)
SPECIFIC GRAVITY UA: 1.03 (ref 1–1.03)
TOTAL PROT. SUM,%: 100 % (ref 98–102)
TOTAL PROT. SUM: 5.8 G/DL (ref 6.3–8.2)
TOTAL PROTEIN: 5.8 G/DL (ref 6.4–8.3)
TSH SERPL DL<=0.05 MIU/L-ACNC: 0.81 MIU/L (ref 0.3–5)
TURBIDITY: CLEAR
URINE HGB: NEGATIVE
UROBILINOGEN, URINE: NORMAL
VITAMIN B-12: 503 PG/ML (ref 232–1245)
VITAMIN D 25-HYDROXY: 14.3 NG/ML (ref 30–100)
ZZ NTE CLEAN UP: ORDERED TEST: NORMAL
ZZ NTE WITH NAME CLEAN UP: SPECIMEN SOURCE: NORMAL

## 2020-07-08 PROCEDURE — 36415 COLL VENOUS BLD VENIPUNCTURE: CPT

## 2020-07-08 PROCEDURE — 84155 ASSAY OF PROTEIN SERUM: CPT

## 2020-07-08 PROCEDURE — 84166 PROTEIN E-PHORESIS/URINE/CSF: CPT

## 2020-07-08 PROCEDURE — 86038 ANTINUCLEAR ANTIBODIES: CPT

## 2020-07-08 PROCEDURE — 86225 DNA ANTIBODY NATIVE: CPT

## 2020-07-08 PROCEDURE — 82746 ASSAY OF FOLIC ACID SERUM: CPT

## 2020-07-08 PROCEDURE — 97530 THERAPEUTIC ACTIVITIES: CPT

## 2020-07-08 PROCEDURE — G0378 HOSPITAL OBSERVATION PER HR: HCPCS

## 2020-07-08 PROCEDURE — 86235 NUCLEAR ANTIGEN ANTIBODY: CPT

## 2020-07-08 PROCEDURE — 82306 VITAMIN D 25 HYDROXY: CPT

## 2020-07-08 PROCEDURE — 97166 OT EVAL MOD COMPLEX 45 MIN: CPT

## 2020-07-08 PROCEDURE — 97162 PT EVAL MOD COMPLEX 30 MIN: CPT

## 2020-07-08 PROCEDURE — 6370000000 HC RX 637 (ALT 250 FOR IP): Performed by: STUDENT IN AN ORGANIZED HEALTH CARE EDUCATION/TRAINING PROGRAM

## 2020-07-08 PROCEDURE — 99220 PR INITIAL OBSERVATION CARE/DAY 70 MINUTES: CPT | Performed by: PSYCHIATRY & NEUROLOGY

## 2020-07-08 PROCEDURE — 84165 PROTEIN E-PHORESIS SERUM: CPT

## 2020-07-08 PROCEDURE — 82947 ASSAY GLUCOSE BLOOD QUANT: CPT

## 2020-07-08 PROCEDURE — 84207 ASSAY OF VITAMIN B-6: CPT

## 2020-07-08 PROCEDURE — 86140 C-REACTIVE PROTEIN: CPT

## 2020-07-08 PROCEDURE — 82607 VITAMIN B-12: CPT

## 2020-07-08 PROCEDURE — 84156 ASSAY OF PROTEIN URINE: CPT

## 2020-07-08 PROCEDURE — 84443 ASSAY THYROID STIM HORMONE: CPT

## 2020-07-08 PROCEDURE — 85652 RBC SED RATE AUTOMATED: CPT

## 2020-07-08 PROCEDURE — 97535 SELF CARE MNGMENT TRAINING: CPT

## 2020-07-08 RX ORDER — ACETAMINOPHEN 325 MG/1
650 TABLET ORAL EVERY 4 HOURS PRN
Status: DISCONTINUED | OUTPATIENT
Start: 2020-07-08 | End: 2020-07-08

## 2020-07-08 RX ORDER — OXYCODONE HYDROCHLORIDE AND ACETAMINOPHEN 5; 325 MG/1; MG/1
1 TABLET ORAL EVERY 6 HOURS PRN
Status: DISCONTINUED | OUTPATIENT
Start: 2020-07-08 | End: 2020-07-09 | Stop reason: HOSPADM

## 2020-07-08 RX ORDER — LIDOCAINE 50 MG/G
OINTMENT TOPICAL PRN
Status: DISCONTINUED | OUTPATIENT
Start: 2020-07-08 | End: 2020-07-09 | Stop reason: HOSPADM

## 2020-07-08 RX ORDER — OXYCODONE HYDROCHLORIDE AND ACETAMINOPHEN 5; 325 MG/1; MG/1
2 TABLET ORAL EVERY 6 HOURS PRN
Status: DISCONTINUED | OUTPATIENT
Start: 2020-07-08 | End: 2020-07-09 | Stop reason: HOSPADM

## 2020-07-08 RX ORDER — CALCIUM CARBONATE/VITAMIN D3 250-3.125
1 TABLET ORAL DAILY
Status: DISCONTINUED | OUTPATIENT
Start: 2020-07-08 | End: 2020-07-09 | Stop reason: HOSPADM

## 2020-07-08 RX ORDER — ACETAMINOPHEN 325 MG/1
650 TABLET ORAL EVERY 4 HOURS PRN
Status: DISCONTINUED | OUTPATIENT
Start: 2020-07-08 | End: 2020-07-09 | Stop reason: HOSPADM

## 2020-07-08 RX ADMIN — HYDROCODONE BITARTRATE AND ACETAMINOPHEN 2 TABLET: 5; 325 TABLET ORAL at 09:00

## 2020-07-08 RX ADMIN — ARIPIPRAZOLE 10 MG: 10 TABLET ORAL at 09:00

## 2020-07-08 RX ADMIN — CITALOPRAM 40 MG: 20 TABLET, FILM COATED ORAL at 08:59

## 2020-07-08 RX ADMIN — GABAPENTIN 400 MG: 400 CAPSULE ORAL at 23:25

## 2020-07-08 RX ADMIN — OXYCODONE HYDROCHLORIDE AND ACETAMINOPHEN 2 TABLET: 5; 325 TABLET ORAL at 17:20

## 2020-07-08 RX ADMIN — INSULIN LISPRO 6 UNITS: 100 INJECTION, SOLUTION INTRAVENOUS; SUBCUTANEOUS at 09:00

## 2020-07-08 RX ADMIN — GABAPENTIN 400 MG: 400 CAPSULE ORAL at 14:07

## 2020-07-08 RX ADMIN — CHLORPHENIRAMINE MALEATE 250 MG: 4 TABLET ORAL at 17:20

## 2020-07-08 RX ADMIN — TRAZODONE HYDROCHLORIDE 150 MG: 100 TABLET ORAL at 23:24

## 2020-07-08 RX ADMIN — GABAPENTIN 400 MG: 400 CAPSULE ORAL at 08:59

## 2020-07-08 RX ADMIN — INSULIN LISPRO 8 UNITS: 100 INJECTION, SOLUTION INTRAVENOUS; SUBCUTANEOUS at 14:07

## 2020-07-08 RX ADMIN — INSULIN LISPRO 10 UNITS: 100 INJECTION, SOLUTION INTRAVENOUS; SUBCUTANEOUS at 17:21

## 2020-07-08 ASSESSMENT — ENCOUNTER SYMPTOMS
RHINORRHEA: 1
PHOTOPHOBIA: 1
SORE THROAT: 0
EYE PAIN: 1
EYE DISCHARGE: 0
CHEST TIGHTNESS: 0
NAUSEA: 1
VOMITING: 1
ABDOMINAL PAIN: 1
SHORTNESS OF BREATH: 1

## 2020-07-08 ASSESSMENT — PAIN SCALES - GENERAL
PAINLEVEL_OUTOF10: 10
PAINLEVEL_OUTOF10: 10
PAINLEVEL_OUTOF10: 0
PAINLEVEL_OUTOF10: 10
PAINLEVEL_OUTOF10: 10
PAINLEVEL_OUTOF10: 0

## 2020-07-08 ASSESSMENT — PAIN DESCRIPTION - PAIN TYPE
TYPE: ACUTE PAIN
TYPE: ACUTE PAIN;CHRONIC PAIN

## 2020-07-08 ASSESSMENT — PAIN DESCRIPTION - LOCATION
LOCATION: BACK;HEAD;LEG
LOCATION: BACK;HEAD;LEG
LOCATION: LEG

## 2020-07-08 ASSESSMENT — PAIN DESCRIPTION - ORIENTATION
ORIENTATION: RIGHT;LEFT
ORIENTATION: RIGHT;LEFT;LOWER
ORIENTATION: RIGHT;LEFT

## 2020-07-08 ASSESSMENT — PAIN DESCRIPTION - DESCRIPTORS: DESCRIPTORS: ACHING;CONSTANT

## 2020-07-08 NOTE — DISCHARGE INSTR - COC
Continuity of Care Form    Patient Name: Clayton Almaguer   :  1965  MRN:  [id number]    Admit date:  2020  Discharge date:  2020    Code Status Order: Full Code   Advance Directives:   [facility] Documentation     Date/Time Healthcare Directive Type of Healthcare Directive Copy in [address] Agent's Name Healthcare Agent's Phone Number    20 1858  No, patient does not have an advance directive for healthcare treatment -- -- -- -- --          Admitting Physician:  Hiwot Gaspar MD  PCP: No primary care provider on file. Discharging Nurse: Heather Ledezma  Discharging [facility] Unit/Room#: 7168/0756-01  Discharging Unit Phone Number: [phone]    Emergency Contact:   Extended Emergency Contact Information  Primary Emergency Contact: Leora Palmer  Address: NOT AVAILABLE   [address] Phone: [phone]  Relation: Child  Secondary Emergency Contact: Tommy Cordero   [address] Phone: [phone]  Relation: Spouse    Past Surgical History:  Past Surgical History:   Procedure Laterality Date    ABDOMEN SURGERY      drain tube    ABSCESS DRAINAGE      right buttock    CATARACT REMOVAL WITH IMPLANT Bilateral     CHEST TUBE INSERTION      LASIK Bilateral        Immunization History: There is no immunization history on file for this patient.     Active Problems:  Patient Active Problem List   Diagnosis Code    IDDM (insulin dependent diabetes mellitus) ([facility] E11.9, Z79.4    Lupus ([facility] M32.9    Bipolar disorder, mixed ([address] F31.60    Post traumatic stress disorder (PTSD) F43.10    Acute cystitis with hematuria N30.01    Hyperglycemia R73.9    Suicidal ideation R45.851    Arthritis M19.90    Chronic back pain M54.9, G89.29    Acid reflux K21.9    Cerebrovascular accident (CVA) ([address] I63.9    History of stroke Z80.78    Polysubstance abuse ([address] F19.10    Bipolar 1 disorder ([address] F31.9    Cocaine abuse ([facility]) F14.10    Chest pain R07.9    Acute non-recurrent maxillary sinusitis J01.00    Acute respiratory failure with hypercapnia ([facility]) J96.02    Alcohol use disorder, severe, dependence ([facility]) F10.20    Asthma exacerbation attacks J45. 901    Bilateral edema of lower extremity R60.0    Bipolar 1 disorder, depressed, severe ([facility]) F31.4    BMI 34.0-34.9,adult Z68.34    Cannabis use disorder, severe, dependence ([facility]) F12.20    Cocaine use disorder, severe, dependence ([facility]) F14.20    Dizziness R42    Dyslipidemia E78.5    Family history of colon cancer Z80.0    History of lupus ([facility] M32.9    Homicidal ideation R45.850    Hypotension I95.9    Neuropathy G62.9    Normocytic anemia D64.9    Recurrent depression ([facility]) F33.9    Recurrent major depressive disorder, in partial remission ([facility]) F33.41    Severe recurrent major depression with psychotic features ([facility]) F33.3    Severe recurrent major depression without psychotic features ([facility]) F33.2    Upper GI bleed K92.2    Vitamin D deficiency E55.9    White matter changes [id number]    Gastroesophageal reflux disease K21.9    Stroke (cerebrum) ([facility]) I63.9    Rib lesion M89.9    Type 2 diabetes mellitus ([facility]) E11.9    YAEL (generalized anxiety disorder) F41.1    Posttraumatic stress disorder F43.10    Suicidal intent R45.851    Leg weakness, bilateral R29.898       Isolation/Infection:   Isolation          No Isolation        Patient Infection Status     None to display          Nurse Assessment:  Last Vital Signs: BP (!) 104/53   Pulse 86   Temp 98.6 °F (37 °C) (Oral)   Resp 18   LMP  (LMP Unknown)   SpO2 95%     Last documented pain score (0-10 scale): Pain Level: 10  Last Weight:   Wt Readings from Last 1 Encounters:   03/30/19 220 lb (99.8 kg)     Mental Status:  oriented    IV Access:  - None    Nursing Mobility/ADLs:  Walking   Dependent  Transfer  Assisted  Bathing  Assisted  Dressing  Assisted  Toileting  Assisted  Feeding Independent  Med Admin  Independent  Med Delivery   whole    Wound Care Documentation and Therapy:        Elimination:  Continence:   · Bowel: Yes  · Bladder: Yes  Urinary Catheter: None   Colostomy/Ileostomy/Ileal Conduit: No       Date of Last BM: ***  No intake or output data in the 24 hours ending 20 1722  No intake/output data recorded. Safety Concerns: At Risk for Falls    Impairments/Disabilities:      None    Nutrition Therapy:  Current Nutrition Therapy:   Oral Diet  Carb Control  Routes of Feeding: Oral  Liquids: No Restrictions  Daily Fluid Restriction: no  Last Modified Barium Swallow with Video (Video Swallowing Test): not done    Treatments at the Time of Hospital Discharge:   Respiratory Treatments: ***  Oxygen Therapy:  is not on home oxygen therapy.   Ventilator:    - No ventilator support    Rehab Therapies: Physical Therapy and Occupational Therapy  Weight Bearing Status/Restrictions: No weight bearing restirctions  Other Medical Equipment (for information only, NOT a DME order):  cane and walker  Other Treatments: ***    Patient's personal belongings (please select all that are sent with patient):  jose m BROCK SIGNATURE:  Electronically signed by Diane Prasad RN on 20 at 1:12 PM EDT    CASE MANAGEMENT/SOCIAL WORK SECTION    Inpatient Status Date: ***    Readmission Risk Assessment Score:  Readmission Risk              Risk of Unplanned Readmission:        0           Discharging to Facility/ Agency   · Name: [facility]   · Address:  · Phone:[phone]  · Fax:[phone]    Dialysis Facility (if applicable)   · Name:  · Address:  · Dialysis Schedule:  · Phone:  · Fax:    / signature: Electronically signed by Marrion Apgar, RN on 20 at 1:04 PM EDT    PHYSICIAN SECTION    Prognosis: Good    Condition at Discharge: Stable    Rehab Potential (if transferring to Rehab): Good    Recommended Labs or Other Treatments After Discharge: Physician Certification: I certify the above information and transfer of Audrey San  is necessary for the continuing treatment of the diagnosis listed and that she requires Venkat Kelby for greater 30 days.      Update Admission H&P: No change in H&P    PHYSICIAN SIGNATURE:  Electronically signed by Percy Hallman MD on 7/8/20 at 5:22 PM EDT

## 2020-07-08 NOTE — PROGRESS NOTES
Occupational [address] [employer] [facility]  Occupational Therapy Not Seen Note    DATE: 2020  Name: Linh Mcnamara  : 1965  MRN: [id number]    Patient not available for Occupational Therapy due to:    [] Testing:    [] Hemodialysis    [] Blood Transfusion in Progress    [x]Refusal by Patient: Pt nauseated upon arrival. Max verbal encouragement provided for participation. Pt nauseated throughout, pt got sick in bucket. Pt deferred eval at this time. Will check back as able      [] Surgery/Procedure:    [] Strict Bedrest    [] Sedation    [] Spine Precautions     [] Pt being transferred to palliative care at this time. Spoke with pt/family and OT services to be defered. [] Pt independent with functional mobility and functional tasks.  Pt with no OT acute care needs at this time, will defer OT eval.    [] Other    Next Scheduled Treatment: Will check back as able     Arelis Mckinney OTR/L

## 2020-07-08 NOTE — CONSULTS
lower extremities stop and glove distribution denies any neck pain, back pain, bowel bladder incontinence. No current facility-administered medications on file prior to encounter. Current Outpatient Medications on File Prior to Encounter   Medication Sig Dispense Refill    gabapentin (NEURONTIN) 300 MG capsule Take 3 capsules by mouth 3 times daily for 30 days. 90 capsule 0    citalopram (CELEXA) 40 MG tablet Take 1 tablet by mouth daily 30 tablet 0    traZODone (DESYREL) 150 MG tablet Take 1 tablet by mouth nightly 14 tablet 1    ARIPiprazole (ABILIFY) 10 MG tablet Take 1 tablet by mouth daily 30 tablet 0    ibuprofen (ADVIL;MOTRIN) 800 MG tablet Take 1 tablet by mouth every 8 hours as needed for Pain 30 tablet 0    acetaminophen (TYLENOL) 325 MG tablet Take 2 tablets by mouth every 6 hours as needed for Pain 30 tablet 0     Allergies: Terri Palmer is allergic to bactrim [sulfamethoxazole-trimethoprim] and adhesive tape.     Past Medical History:   Diagnosis Date    Acid reflux 5/29/2017    Acute cystitis with hematuria 10/11/2016    Acute non-recurrent maxillary sinusitis 11/28/2017    Asthma     Bipolar 1 disorder ([address] 1/4/2018    Bipolar disorder, mixed ([address]     BMI 34.0-34.9,adult 11/28/2017    Cannabis use disorder, severe, dependence ([address] 12/19/2017    Cerebrovascular accident (CVA) ([address] 6/14/2017    Chest pain 11/5/2016    Chronic renal insufficiency     Cocaine abuse ([address] 1/5/2018    DDD (degenerative disc disease), cervical     Diabetes mellitus ([address]     Dizziness 6/13/2017    Fibromyalgia     History of stroke 9/9/2017    Homicidal ideation 11/6/2017    Hyperglycemia     Hypertension     Hypotension 1/18/2019    IDDM (insulin dependent diabetes mellitus) ([address] 12/21/2015    Lupus (HCC)     Migraine     Neuropathy     Neuropathy     Polysubstance abuse ([address] 9/17/2017    Post traumatic stress disorder (PTSD)     Posttraumatic stress disorder 5/29/2017    abdominal pain, diarrhea, blood in stool   Musculoskeletal Negative for joint pain, negative for myalgia   Skin Negative for rash or itching   neurological  positive for paresthesia bilaterally   Psychiatric Negative for suicidal ideation. Patient is not anxious           Objective:   BP (!) 104/53   Pulse 86   Temp 98.6 °F (37 °C) (Oral)   Resp 18   LMP  (LMP Unknown)   SpO2 95%     Blood pressure range: Systolic (14BVN), QAU:419 , Min:101 , QQQ:301   ; Diastolic (67ZPS), HCP:01, Min:53, Max:76      Continuous infusions:    dextrose         Physical Exam  Constitutional:       Appearance: Normal appearance. HENT:      Head: Normocephalic and atraumatic. Nose: Nose normal.      Mouth/Throat:      Mouth: Mucous membranes are moist.   Eyes:      Conjunctiva/sclera: Conjunctivae normal.      Pupils: Pupils are equal, round, and reactive to light. Neck:      Musculoskeletal: Normal range of motion. Cardiovascular:      Rate and Rhythm: Normal rate. Pulses: Normal pulses. Pulmonary:      Effort: Pulmonary effort is normal.   Abdominal:      General: Bowel sounds are normal.   Skin:     Capillary Refill: Capillary refill takes 2 to 3 seconds. Neurological:      Mental Status: She is alert. GCS: GCS eye subscore is 4. GCS verbal subscore is 5. GCS motor subscore is 6. Cranial Nerves: Cranial nerves are intact. Sensory: Sensory deficit present. Coordination: Coordination is intact. Deep Tendon Reflexes:      Reflex Scores:       Tricep reflexes are 1+ on the right side and 1+ on the left side. Bicep reflexes are 1+ on the right side and 1+ on the left side. Brachioradialis reflexes are 0 on the right side and 0 on the left side. Patellar reflexes are 0 on the right side and 0 on the left side. Achilles reflexes are 0 on the right side and 0 on the left side.      Comments: Patient has decreased sensation to pinprick, temperature in both small upper and lower extremities more on the right side than left. Motor strength 3-/5 in right upper and lower extremity and 3+/5 in left lower upper and lower extremity. Cline sign negative no clonus seen  Patient uses cane/walker to ambulate for past 2 years. Data:    Lab Results:     Lab Results   Component Value Date    CHOL 190 12/18/2019    LDLCHOLESTEROL 100 12/18/2019    HDL 72 12/18/2019    TRIG 90 12/18/2019    ALT 11 07/07/2020    AST 13 07/07/2020    TSH 2.72 11/26/2019    LABA1C 6.1 (H) 11/26/2019     Hematology:  Recent Labs     07/07/20  0620   WBC 6.1   HGB 12.4   HCT 37.7        Chemistry:  Recent Labs     07/07/20  0620 07/07/20  0815     --    K 4.1  --    CL 99  --    CO2 22  --    GLUCOSE 428* 331   BUN 19  --    CREATININE 1.02*  --    CALCIUM 9.5  --      Recent Labs     07/07/20  0620   PROT 7.7   LABALBU 4.2   AST 13   ALT 11       No results found for: PHENYTOIN, PHENYTOIN, VALPROATE, CBMZ        Diagnostic data reviewed:  X-ray tibia-fibula left    Impression   Normal radiographs of left tibia and fibula       X-ray hip:    Impression   Hip and  spine osteoarthritis without acute osseous abnormality. CT head and CT cervical spine without contrast  Assessme  Impression   No acute CT abnormality identified in the brain.       No acute osseous abnormality identified in the cervical spine. CT lumbar spine without contrast:    nt Recom  Impression   No acute thoracic or lumbar spine trauma.      mendations:  ASSESSMENT RECOMMENDATIONS:     Ms. Tom Barlow is a 50 y.o. female with past medical history of  migraine, neuropathy in the setting of diabetes mellitus, bipolar  1 disorder, asthma, cocaine abuse presented to the ED with diffuse abdominal pain for 2 days in the setting of hyper glycemia blood sugar in 400s and had bilateral lower extremity paresthesia in the setting of fall  -X-ray of the tibia-fibula and hip are unremarkable for any acute fracture or ligamentous injury, CT head without contrast, CT cervical spine without contrast, CT lumbar spine without contrast unremarkable  Patient's paresthesia more likely related to metabolic in etiology given her elevated blood sugar level in ED and medication noncompliance however we will check vitamin B12, folate,vitamin B6, Hb 1 AC, TSH ,TATIANA, SPEP,UPEP ESR and CRP to rule out other  causes of neuropathy. Patient takes gabapentin 400 mg bid , increased to 400 mg 3 times daily, lidocaine ointment scribed. Patient counseled out better blood glucose control and compliance. Would benefit from outpatient EMG studies if blood tests are unremarkable  Neurology will sign off can follow outpatient with neurology clinic in regards to labs and emg studies      Thank you for consultation.          Nicky Noriega MD Pager: [phone]  Electronically signed 7/8/2020 at 8:43 AM

## 2020-07-08 NOTE — H&P
[address] [employer]  CDU / OBSERVATION ENCOUNTER  RESIDENT NOTE     Pt Name: Kd Oden  MRN: [id number]  [place] 1965  Date of evaluation: 7/8/20  Patient's PCP is : No primary care provider on file. CHIEF COMPLAINT       Chief Complaint   Patient presents with    Abdominal Pain         HISTORY OF PRESENT ILLNESS    Kd Oden is a 50 y.o. female who presents acute worsening of chronic hip and lateral leg pain. She states she has had multiple falls over the past few months that have exacerbated her pain in her legs and hips. She states she has aching and numbness and tingling in bilateral legs. Pain radiates from her hips on both legs. She states that her right leg is worse than her left leg. Weakness in right leg is worse than left leg. Attempting to move around or walk makes the pain worse, patient states that the pain has been so bad that she has not been able to walk. She states it is moderate to severe pain. The pain has worsened over the past few days and it is why she came to the hospital.  Although she does admit that this has been chronic in nature. Pain medication somewhat help with the pain. Location/Symptom: Bilateral legs -pain, numbness, tingling, weakness  Timing/Onset: Months with acute worsening in the past few days  Provocation: Movement and walking worsens the symptoms  Quality: Aching, numbness, tingling, weakness  Radiation: From lower back and hips down bilateral legs  Severity: Moderate to severe  Timing/Duration: Has been constant over the past few months with acute worsening in the past few days -round the clock pain  Modifying Factors: Pain medications somewhat relieve pain    REVIEW OF SYSTEMS       Review of Systems   Constitutional: Positive for activity change and chills. Negative for fever. HENT: Positive for congestion, ear pain and rhinorrhea. Negative for sore throat.     Eyes: Positive for photophobia, pain and visual disturbance (Blurred vision of right eye). Negative for discharge. Respiratory: Positive for shortness of breath (With movement due to pain). Negative for chest tightness. Cardiovascular: Negative for chest pain and palpitations. Gastrointestinal: Positive for abdominal pain (\"That is always there\"), nausea and vomiting (states last episode was at home). States she has both constipation and diarrhea intermittently   Genitourinary: Negative for dysuria and urgency. Musculoskeletal: Positive for arthralgias (\"All over my body\") and myalgias (\"My muscles always hurt\"). Skin: Negative for rash and wound. Neurological: Positive for dizziness, weakness (Bilateral legs), light-headedness, numbness (bilateral legs) and headaches. (PQRS) Advance directives on face sheet per hospital policy.  No change unless specifically mentioned in chart    PAST MEDICAL HISTORY    has a past medical history of Acid reflux, Acute cystitis with hematuria, Acute non-recurrent maxillary sinusitis, Asthma, Bipolar 1 disorder ([address] Bipolar disorder, mixed ([address] BMI 34.0-34.9,adult, Cannabis use disorder, severe, dependence ([address] Cerebrovascular accident (CVA) ([address] Chest pain, Chronic renal insufficiency, Cocaine abuse ([address] DDD (degenerative disc disease), cervical, Diabetes mellitus ([address] Dizziness, Fibromyalgia, History of stroke, Homicidal ideation, Hyperglycemia, Hypertension, Hypotension, IDDM (insulin dependent diabetes mellitus) ([address] Lupus ([address] Migraine, Neuropathy, Neuropathy, Polysubstance abuse ([address] Post traumatic stress disorder (PTSD), Posttraumatic stress disorder, Recurrent depression ([address] Recurrent major depressive disorder, in partial remission ([address] Screening mammogram, encounter for, Severe recurrent major depression with psychotic features ([address] Severe recurrent major depression without psychotic features ([address] Stroke (cerebrum) ([address] Stroke ([address] Suicidal ideation, Suicidal intent, Vitamin D deficiency, and White matter changes. I have reviewed the past medical history with the patient and it is pertinent to this complaint. SURGICAL HISTORY      has a past surgical history that includes Abscess Drainage; chest tube insertion; Abdomen surgery; Cataract removal with implant (Bilateral); and LASIK (Bilateral). I have reviewed and agree with Surgical History entered and it is pertinent to this complaint. CURRENT MEDICATIONS     lidocaine (XYLOCAINE) 5 % ointment, PRN  sodium chloride flush 0.9 % injection 10 mL, 2 times per day  sodium chloride flush 0.9 % injection 10 mL, PRN  acetaminophen (TYLENOL) tablet 650 mg, Q4H PRN  fentaNYL (SUBLIMAZE) injection 25 mcg, Once  ARIPiprazole (ABILIFY) tablet 10 mg, Daily  citalopram (CELEXA) tablet 40 mg, Daily  gabapentin (NEURONTIN) capsule 400 mg, TID  insulin lispro (HUMALOG) injection vial 0-12 Units, TID WC  insulin lispro (HUMALOG) injection vial 0-6 Units, Nightly  glucose (GLUTOSE) 40 % oral gel 15 g, PRN  dextrose 50 % IV solution, PRN  glucagon (rDNA) injection 1 mg, PRN  dextrose 5 % solution, PRN  traZODone (DESYREL) tablet 150 mg, Nightly  HYDROcodone-acetaminophen (NORCO) 5-325 MG per tablet 2 tablet, Q6H PRN        All medication charted and reviewed. ALLERGIES     is allergic to bactrim [sulfamethoxazole-trimethoprim] and adhesive tape. FAMILY HISTORY     She indicated that the status of her mother is unknown. She indicated that the status of her father is unknown. She indicated that the status of her sister is unknown. She indicated that the status of her brother is unknown. She indicated that her son is . family history includes Diabetes in her brother, father, mother, and sister; Other in her son; Stroke in her mother. The patient denies any pertinent family history. I have reviewed and agree with the family history entered.   I have reviewed the Family History and it is not significant to the case    SOCIAL HISTORY      reports that she has never smoked. She has never used smokeless tobacco. She reports previous alcohol use. She reports previous drug use. Drugs: Marijuana and Cocaine. I have reviewed and agree with all Social.  There are concerns for substance abuse/use. PHYSICAL EXAM     INITIAL VITALS:  oral temperature is 98.6 °F (37 °C). Her blood pressure is 104/53 (abnormal) and her pulse is 86. Her respiration is 18 and oxygen saturation is 95%. Physical Exam  Constitutional:       General: She is not in acute distress. Appearance: She is not ill-appearing. HENT:      Head: Normocephalic and atraumatic. Nose: Nose normal.      Mouth/Throat:      Mouth: Mucous membranes are moist.      Pharynx: Oropharynx is clear. Eyes:      General:         Right eye: No discharge. Left eye: No discharge. Neck:      Musculoskeletal: Normal range of motion. No neck rigidity. Cardiovascular:      Rate and Rhythm: Normal rate and regular rhythm. Heart sounds: No murmur. No friction rub. No gallop. Pulmonary:      Effort: Pulmonary effort is normal. No respiratory distress. Breath sounds: Normal breath sounds. Abdominal:      General: There is no distension. Palpations: Abdomen is soft. Tenderness: There is no abdominal tenderness. Musculoskeletal:      Comments: Right leg 3 out of 5 muscle strength   Left leg 4 out of 5 muscle strength   Neurologically intact bilaterally -we will to move bilateral legs at knee ankle and all digits distally  Station intact bilaterally of distal digits   Skin:     Capillary Refill: Capillary refill takes less than 2 seconds. Neurological:      Mental Status: She is alert and oriented to person, place, and time. Cranial Nerves: No cranial nerve deficit.       Comments: Cranial nerves II through XII intact bilaterally             DIFFERENTIAL DIAGNOSIS/MDM:     DDx: Diabetic neuropathy, lumbar radiculopathy, arthritis, acute traumatic fracture of thoracic or lumbar pain, acute traumatic hip fracture    DIAGNOSTIC RESULTS     EKG: All EKG's are interpreted by the Observation Physician who either signs or Co-signs this chart in the absence of a cardiologist.    EKG Interpretation    Interpreted by observation physician    No EKG indicated at this time    Jenna Rader DO        RADIOLOGY:   I directly visualized the following  images and reviewed the radiologist interpretations:    Xr Tibia Fibula Left (2 Views)    Result Date: 7/7/2020  EXAMINATION: XRAY VIEWS OF THE LEFT TIBIA AND FIBULA 7/7/2020 10:01 am COMPARISON: Left ankle radiographs dated 1 October 2018 HISTORY: ORDERING SYSTEM PROVIDED HISTORY: tender to palpation TECHNOLOGIST PROVIDED HISTORY: tender to palpation FINDINGS: AP and lateral of the left tibia and fibula submitted. No acute fracture or dislocation. No significant degenerative change. Multiple benign calcific soft tissue densities noted, likely chronic posttraumatic. No destructive lesions of bone. Normal radiographs of left tibia and fibula     Ct Head Wo Contrast    Result Date: 7/7/2020  EXAMINATION: CT OF THE HEAD WITHOUT CONTRAST; CT OF THE CERVICAL SPINE WITHOUT CONTRAST 7/7/2020 10:30 am TECHNIQUE: CT of the head was performed without the administration of intravenous contrast. Dose modulation, iterative reconstruction, and/or weight based adjustment of the mA/kV was utilized to reduce the radiation dose to as low as reasonably achievable.; CT of the cervical spine was performed without the administration of intravenous contrast. Multiplanar reformatted images are provided for review. Dose modulation, iterative reconstruction, and/or weight based adjustment of the mA/kV was utilized to reduce the radiation dose to as low as reasonably achievable.  COMPARISON: 10/21/2017 HISTORY: ORDERING SYSTEM PROVIDED HISTORY: fall, hit head, no LOC TECHNOLOGIST PROVIDED HISTORY: fall, hit head, no LOC Is the patient pregnant?->No; ORDERING SYSTEM PROVIDED HISTORY: fall, midline neck pain TECHNOLOGIST PROVIDED HISTORY: fall, midline neck pain Is the patient pregnant?->No FINDINGS: HEAD: BRAIN/VENTRICLES: There is no acute intracranial hemorrhage, mass effect or midline shift. No abnormal extra-axial fluid collection. Chronic appearing periventricular and subcortical white matter changes are again demonstrated. This likely represents chronic small vessel disease. There is no evidence of hydrocephalus. ORBITS: The visualized portion of the orbits demonstrate no acute abnormality. SINUSES: The visualized paranasal sinuses and mastoid air cells demonstrate no acute abnormality. SOFT TISSUES/SKULL:  No acute abnormality of the visualized skull or soft tissues. CERVICAL SPINE: BONES/ALIGNMENT: There is no evidence of an acute cervical spine fracture. There is normal alignment of the cervical spine. DEGENERATIVE CHANGES: Facet arthropathy noted at C4-5 on the right. SOFT TISSUES: There is no prevertebral soft tissue swelling. No acute CT abnormality identified in the brain. No acute osseous abnormality identified in the cervical spine. Ct Cervical Spine Wo Contrast    Result Date: 7/7/2020  EXAMINATION: CT OF THE HEAD WITHOUT CONTRAST; CT OF THE CERVICAL SPINE WITHOUT CONTRAST 7/7/2020 10:30 am TECHNIQUE: CT of the head was performed without the administration of intravenous contrast. Dose modulation, iterative reconstruction, and/or weight based adjustment of the mA/kV was utilized to reduce the radiation dose to as low as reasonably achievable.; CT of the cervical spine was performed without the administration of intravenous contrast. Multiplanar reformatted images are provided for review. Dose modulation, iterative reconstruction, and/or weight based adjustment of the mA/kV was utilized to reduce the radiation dose to as low as reasonably achievable.  COMPARISON: 10/21/2017 HISTORY: ORDERING SYSTEM PROVIDED HISTORY: fall, hit head, no LOC TECHNOLOGIST PROVIDED HISTORY: fall, hit head, no LOC Is the patient pregnant?->No; ORDERING SYSTEM PROVIDED HISTORY: fall, midline neck pain TECHNOLOGIST PROVIDED HISTORY: fall, midline neck pain Is the patient pregnant?->No FINDINGS: HEAD: BRAIN/VENTRICLES: There is no acute intracranial hemorrhage, mass effect or midline shift. No abnormal extra-axial fluid collection. Chronic appearing periventricular and subcortical white matter changes are again demonstrated. This likely represents chronic small vessel disease. There is no evidence of hydrocephalus. ORBITS: The visualized portion of the orbits demonstrate no acute abnormality. SINUSES: The visualized paranasal sinuses and mastoid air cells demonstrate no acute abnormality. SOFT TISSUES/SKULL:  No acute abnormality of the visualized skull or soft tissues. CERVICAL SPINE: BONES/ALIGNMENT: There is no evidence of an acute cervical spine fracture. There is normal alignment of the cervical spine. DEGENERATIVE CHANGES: Facet arthropathy noted at C4-5 on the right. SOFT TISSUES: There is no prevertebral soft tissue swelling. No acute CT abnormality identified in the brain. No acute osseous abnormality identified in the cervical spine. Ct Thoracic Spine Wo Contrast    Result Date: 7/7/2020  EXAMINATION: CT OF THE LUMBAR SPINE WITHOUT CONTRAST; CT OF THE THORACIC SPINE WITHOUT CONTRAST 7/7/2020 TECHNIQUE: CT of the lumbar spine was performed without the administration of intravenous contrast. Multiplanar reformatted images are provided for review. Dose modulation, iterative reconstruction, and/or weight based adjustment of the mA/kV was utilized to reduce the radiation dose to as low as reasonably achievable.; CT of the thoracic spine was performed without the administration of intravenous contrast. Multiplanar reformatted images are provided for review.  Dose modulation, iterative reconstruction, and/or weight based adjustment of the mA/kV was utilized to reduce the radiation dose to as low as reasonably achievable. COMPARISON: Lumbar spine series 03/30/2019. HISTORY: ORDERING SYSTEM PROVIDED HISTORY: fall, midline back pain, subjective LLE paresthesia TECHNOLOGIST PROVIDED HISTORY: fall, midline back pain, subjective LLE paresthesia Is the patient pregnant?->No; ORDERING SYSTEM PROVIDED HISTORY: fall, midline thoracic pain TECHNOLOGIST PROVIDED HISTORY: fall, midline thoracic pain Is the patient pregnant?->No FINDINGS: BONES/ALIGNMENT: There is no acute fracture or traumatic malalignment. DEGENERATIVE CHANGES: Mild degenerative spurring in the lower thoracic spine. Lumbar disc space height is maintained with mild multilevel lumbar spondylosis. Moderate facet arthropathy at the L3-4 through L5-S1 levels. SOFT TISSUES: No paraspinal mass is identified. Nonobstructive bilateral nephrolithiasis. No acute thoracic or lumbar spine trauma. Ct Lumbar Spine Wo Contrast    Result Date: 7/7/2020  EXAMINATION: CT OF THE LUMBAR SPINE WITHOUT CONTRAST; CT OF THE THORACIC SPINE WITHOUT CONTRAST 7/7/2020 TECHNIQUE: CT of the lumbar spine was performed without the administration of intravenous contrast. Multiplanar reformatted images are provided for review. Dose modulation, iterative reconstruction, and/or weight based adjustment of the mA/kV was utilized to reduce the radiation dose to as low as reasonably achievable.; CT of the thoracic spine was performed without the administration of intravenous contrast. Multiplanar reformatted images are provided for review. Dose modulation, iterative reconstruction, and/or weight based adjustment of the mA/kV was utilized to reduce the radiation dose to as low as reasonably achievable. COMPARISON: Lumbar spine series 03/30/2019.  HISTORY: ORDERING SYSTEM PROVIDED HISTORY: fall, midline back pain, subjective LLE paresthesia TECHNOLOGIST PROVIDED HISTORY: fall, midline back pain, subjective LLE paresthesia Is the Abnormal; Notable for the following components:    Glucose, Ur 3+ (*)     Ketones, Urine TRACE (*)     All other components within normal limits   SEDIMENTATION RATE - Abnormal; Notable for the following components:    Sed Rate 36 (*)     All other components within normal limits   VITAMIN D 25 HYDROXY - Abnormal; Notable for the following components:    Vit D, 25-Hydroxy 14.3 (*)     All other components within normal limits   ELECROPHORESIS PROTEIN, SERUM WITHOUT REFLEX TO IMMUNOFIXATION - Abnormal; Notable for the following components: Total Protein 5.8 (*)     All other components within normal limits   POC GLUCOSE FINGERSTICK - Abnormal; Notable for the following components:    POC Glucose 419 (*)     All other components within normal limits   POC GLUCOSE FINGERSTICK - Abnormal; Notable for the following components:    POC Glucose 331 (*)     All other components within normal limits   POC GLUCOSE FINGERSTICK - Abnormal; Notable for the following components:    POC Glucose 380 (*)     All other components within normal limits   POC GLUCOSE FINGERSTICK - Abnormal; Notable for the following components:    POC Glucose 235 (*)     All other components within normal limits   POC GLUCOSE FINGERSTICK - Abnormal; Notable for the following components:    POC Glucose 261 (*)     All other components within normal limits   POC GLUCOSE FINGERSTICK - Abnormal; Notable for the following components:    POC Glucose 321 (*)     All other components within normal limits   POCT GLUCOSE - Normal   LIPASE   APTT   C-REACTIVE PROTEIN   VITAMIN B12 & FOLATE   PROTEIN ELECTROPHORESIS, URINE   TSH WITH REFLEX   SPECIMEN REJECTION   TATIANA SCREEN WITH REFLEX   VITAMIN B6   PREVIOUS SPECIMEN   HEMOGLOBIN A1C         CDU IMPRESSION / Linda Pratibha is a 50 y.o. female who presents with acute exacerbation of chronic hip and leg pain.      1. Acute exacerbation of chronic hip and leg pain  · Neurology consult and recommendations

## 2020-07-08 NOTE — PROGRESS NOTES
Physical Therapy    Facility/Department: [address] MED SURG  Initial Assessment    NAME: Nicki Boudreaux  : 1965  MRN: [id number]    Date of Service: 2020  Chief Complaint   Patient presents with    Abdominal Pain     Discharge Recommendations:  Patient would benefit from continued therapy after discharge   Assessment   Body structures, Functions, Activity limitations: Decreased functional mobility ; Decreased endurance;Decreased balance;Decreased strength; Increased pain  Assessment: The pt transferred sit to stand with mod assist x 2  Prognosis: Good  Decision Making: Medium Complexity  PT Education: Goals;PT Role;Plan of Care  REQUIRES PT FOLLOW UP: Yes  Activity Tolerance  Activity Tolerance: Patient limited by fatigue;Patient limited by pain   Patient Diagnosis(es): The primary encounter diagnosis was Generalized abdominal pain. Diagnoses of Hyperglycemia, Fall from standing, initial encounter, Generalized weakness, and Neuropathy were also pertinent to this visit.    has a past medical history of Acid reflux, Acute cystitis with hematuria, Acute non-recurrent maxillary sinusitis, Asthma, Bipolar 1 disorder ([address] Bipolar disorder, mixed ([address] BMI 34.0-34.9,adult, Cannabis use disorder, severe, dependence ([address] Cerebrovascular accident (CVA) ([address] Chest pain, Chronic renal insufficiency, Cocaine abuse ([address] DDD (degenerative disc disease), cervical, Diabetes mellitus ([address] Dizziness, Fibromyalgia, History of stroke, Homicidal ideation, Hyperglycemia, Hypertension, Hypotension, IDDM (insulin dependent diabetes mellitus) ([address] Lupus ([address] Migraine, Neuropathy, Neuropathy, Polysubstance abuse ([address] Post traumatic stress disorder (PTSD), Posttraumatic stress disorder, Recurrent depression ([address] Recurrent major depressive disorder, in partial remission ([address] Screening mammogram, encounter for, Severe recurrent major depression with psychotic features ([address] Severe recurrent major depression without (degrees)  LLE AROM : WFL  AROM RUE (degrees)  RUE AROM : WFL  AROM LUE (degrees)  LUE AROM : WFL  Strength RLE  Comment: 3-/5  Strength LLE  Comment: 3-/5  Strength RUE  Strength RUE: WFL  Strength LUE  Strength LUE: WFL     Sensation  Overall Sensation Status: Impaired  Bed mobility  Supine to Sit: Moderate assistance  Sit to Supine: Moderate assistance  Scooting: Moderate assistance  Transfers  Sit to Stand: Moderate Assistance;2 Person Assistance  Stand to sit: Moderate Assistance;2 Person Assistance  Ambulation  Ambulation?: No  Ambulation 1  Surface: level tile  Device: Rolling Walker  Assistance: Moderate assistance;2 Person assistance  Distance: Transfer sit to stand with mod assist x 2.  Significant weakness in LE's during transfer     Balance  Posture: Good  Sitting - Static: Good  Sitting - Dynamic: Fair  Standing - Static: Poor      Plan   Plan  Times per week: 5-6x wk  Current Treatment Recommendations: Strengthening, Transfer Training, Endurance Training, Gait Training, Functional Mobility Training, Pain Management, Safety Education & Training  Safety Devices  Type of devices: Left in bed, Nurse notified, Call light within reach, Bed alarm in place, Patient at risk for falls, Telesitter in use  G-Code     OutComes Score     AM-PAC Score  AM-PAC Inpatient Mobility Raw Score : 15 (07/08/20 1449)  AM-PAC Inpatient T-Scale Score : 39.45 (07/08/20 1449)  Mobility Inpatient CMS 0-100% Score: 57.7 (07/08/20 1449)  Mobility Inpatient CMS G-Code Modifier : CK (07/08/20 1449)    Goals  Short term goals  Time Frame for Short term goals: 10 visits  Short term goal 1: transfers with SBA  Short term goal 2: amb 50 ft with a RW x SBA  Short term goal 3: improve strength to 4/5 throughout  Short term goal 4: exercise program x SBA  Patient Goals   Patient goals : Return home     Therapy Time   Individual Concurrent Group Co-treatment   Time In 1350         Time Out 1413         Minutes 23             [address] of [phone] Inna [place]

## 2020-07-08 NOTE — PROGRESS NOTES
Occupational Therapy   Occupational Therapy Initial Assessment  Date: 2020   Patient Name: Kiera Faustin  MRN: [id number]     : 1965    Date of Service: 2020    Discharge Recommendations:  Patient would benefit from continued therapy after discharge  OT Equipment Recommendations  Equipment Needed: Yes  Mobility Devices: ADL Assistive Devices  ADL Assistive Devices: Shower Chair with back    Assessment   Performance deficits / Impairments: Decreased functional mobility ; Decreased safe awareness;Decreased ADL status; Decreased strength;Decreased endurance;Decreased ROM; Decreased high-level IADLs  Assessment: Pt currently required Mod A x2 for functional transfers and would currently require significant assistacne to complete further ADL and functional mobility tasks. Pt limited by increased pain and weakness in BLE. Pt is expected to require OT services post-acute and during acute hosptilization stay to maximize safety and address current deficits in functional mobility and ADL tasks to increase independence. Pt is currently unsafe to return home and reports having no assistance at home PRN. Prognosis: Good  Decision Making: Medium Complexity  OT Education: OT Role;Plan of Care;Energy Conservation  REQUIRES OT FOLLOW UP: Yes  Activity Tolerance  Activity Tolerance: Patient limited by fatigue;Patient limited by pain  Safety Devices  Safety Devices in place: Yes  Type of devices: Nurse notified;Gait belt;Call light within reach; Left in bed  Restraints  Initially in place: No           Patient Diagnosis(es): The primary encounter diagnosis was Generalized abdominal pain. Diagnoses of Hyperglycemia, Fall from standing, initial encounter, Generalized weakness, and Neuropathy were also pertinent to this visit.      has a past medical history of Acid reflux, Acute cystitis with hematuria, Acute non-recurrent maxillary sinusitis, Asthma, Bipolar 1 disorder ([facility] [address] Bipolar disorder, mixed ([facility] [address] BMI 34.0-34.9,adult, Cannabis use disorder, severe, dependence ([address] Cerebrovascular accident (CVA) ([address] Chest pain, Chronic renal insufficiency, Cocaine abuse ([address] DDD (degenerative disc disease), cervical, Diabetes mellitus ([address] Dizziness, Fibromyalgia, History of stroke, Homicidal ideation, Hyperglycemia, Hypertension, Hypotension, IDDM (insulin dependent diabetes mellitus) ([address] Lupus ([address] Migraine, Neuropathy, Neuropathy, Polysubstance abuse ([address] Post traumatic stress disorder (PTSD), Posttraumatic stress disorder, Recurrent depression ([address] Recurrent major depressive disorder, in partial remission ([address] Screening mammogram, encounter for, Severe recurrent major depression with psychotic features ([address] Severe recurrent major depression without psychotic features ([facility] [address] Stroke (cerebrum) ([facility] [address] Stroke ([address] Suicidal ideation, Suicidal intent, Vitamin D deficiency, and White matter changes. has a past surgical history that includes Abscess Drainage; chest tube insertion; Abdomen surgery; Cataract removal with implant (Bilateral); and LASIK (Bilateral). Restrictions  Restrictions/Precautions  Restrictions/Precautions: General Precautions, Up as Tolerated  Required Braces or Orthoses?: No    Subjective   General  Chart Reviewed: Orders, History and Physical, Progress Notes, Labs, Imaging  Patient assessed for rehabilitation services?: Yes  Family / Caregiver Present: No  General Comment  Comments: Rn ok'd for therapy visit this date. Pt agreeable to session, pleasent/cooperative throughout.    Patient Currently in Pain: Yes  Pain Assessment  Pain Assessment: 0-10  Pain Level: 10  Pain Type: Acute pain  Pain Location: Back;Head;Leg  Pain Orientation: Right;Left  Vital Signs  Patient Currently in Pain: Yes     Social/Functional History  Social/Functional History  Lives With: Alone  Type of Home: Apartment  Home Layout: One level  Home Access: Stairs to enter with rails  Entrance Stairs - Number of Steps: 10  Entrance Stairs - Rails: None  Bathroom Shower/Tub: Tub/Shower unit  Bathroom Toilet: Standard  Bathroom Accessibility: Accessible  Home Equipment: Cane(Pt reports using straight cane)  ADL Assistance: Independent  Homemaking Assistance: Independent  Homemaking Responsibilities: Yes  Ambulation Assistance: Independent  Transfer Assistance: Independent  Active : Yes  Mode of Transportation: Car  Occupation: On disability  Additional Comments: Pt reports she does not have family/friend support at home who would be able to assist PRN. Objective   Vision: Impaired(Pt reports she has lost her glasses and has not been wearing them)  Vision Exceptions: Wears glasses at all times  Hearing: Within functional limits    Orientation  Overall Orientation Status: Within Normal Limits     Balance  Sitting Balance: Stand by assistance  Standing Balance: Moderate assistance(Mod x 2 )  Standing Balance  Time: ~30 seconds   Activity: Standing EOB  Comment: Pt limited by pain and weakness upon standing. Use of RW. 2 person assist.     Functional Mobility  Functional Mobility Comments: Functional mobility unable to assess due to increased pain and weakness upon functional transfers     ADL  Feeding: Independent  Grooming: Increased time to complete;Setup;Minimal assistance  UE Bathing: Setup; Increased time to complete;Minimal assistance  LE Bathing: Increased time to complete;Setup; Moderate assistance  UE Dressing: Setup; Increased time to complete;Minimal assistance  LE Dressing: Increased time to complete;Setup; Moderate assistance  Toileting: Setup; Increased time to complete;Maximum assistance  Additional Comments: Pt declined completing further ADLs this date secondary to increased fatigue and pain upon standing.       Coordination  Movements Are Fluid And Coordinated: Yes     Bed mobility  Supine to Sit: Modified independent  Sit to Supine: Modified independent  Scooting: Modified independent  Comment: Pt functional/ADL task   Short term goal 4: Demonstrate good safety awarneess during ADL/functional mobility tasks        Therapy Time   Individual Concurrent Group Co-treatment   Time In 0145         Time Out 0205         Minutes 20         Timed Code Treatment Minutes: 8 Minutes       Tiajuana Dose, OTR/L

## 2020-07-09 VITALS
TEMPERATURE: 97.5 F | RESPIRATION RATE: 16 BRPM | DIASTOLIC BLOOD PRESSURE: 71 MMHG | HEART RATE: 66 BPM | OXYGEN SATURATION: 97 % | SYSTOLIC BLOOD PRESSURE: 114 MMHG

## 2020-07-09 LAB
ANA REFERENCE RANGE:: ABNORMAL
ANTI DNA DOUBLE STRANDED: 12 IU/ML
ANTI JO-1 IGG: 5 U/ML
ANTI RNP AB: 32 U/ML
ANTI SSA: 127 U/ML
ANTI SSB: 7 U/ML
ANTI-CENTROMERE: 6 U/ML
ANTI-NUCLEAR ANTIBODY (ANA): POSITIVE
ANTI-SCLERODERMA: 39 U/ML
ANTI-SMITH: 23 U/ML
GLUCOSE BLD-MCNC: 427 MG/DL (ref 65–105)
HISTONE ANTIBODY: 12 U/ML

## 2020-07-09 PROCEDURE — G0378 HOSPITAL OBSERVATION PER HR: HCPCS

## 2020-07-09 PROCEDURE — 97110 THERAPEUTIC EXERCISES: CPT

## 2020-07-09 PROCEDURE — 6370000000 HC RX 637 (ALT 250 FOR IP): Performed by: STUDENT IN AN ORGANIZED HEALTH CARE EDUCATION/TRAINING PROGRAM

## 2020-07-09 PROCEDURE — 97530 THERAPEUTIC ACTIVITIES: CPT

## 2020-07-09 PROCEDURE — 82947 ASSAY GLUCOSE BLOOD QUANT: CPT

## 2020-07-09 PROCEDURE — 2580000003 HC RX 258: Performed by: STUDENT IN AN ORGANIZED HEALTH CARE EDUCATION/TRAINING PROGRAM

## 2020-07-09 RX ORDER — OXYCODONE HYDROCHLORIDE AND ACETAMINOPHEN 5; 325 MG/1; MG/1
1 TABLET ORAL EVERY 6 HOURS PRN
Qty: 28 TABLET | Refills: 0 | Status: SHIPPED | OUTPATIENT
Start: 2020-07-09 | End: 2020-07-16

## 2020-07-09 RX ORDER — GLIPIZIDE 10 MG/1
10 TABLET ORAL
Qty: 60 TABLET | Refills: 3 | Status: SHIPPED | OUTPATIENT
Start: 2020-07-09 | End: 2020-10-20 | Stop reason: SDUPTHER

## 2020-07-09 RX ORDER — GLIPIZIDE 10 MG/1
10 TABLET ORAL
Status: DISCONTINUED | OUTPATIENT
Start: 2020-07-09 | End: 2020-07-09 | Stop reason: HOSPADM

## 2020-07-09 RX ADMIN — OXYCODONE HYDROCHLORIDE AND ACETAMINOPHEN 2 TABLET: 5; 325 TABLET ORAL at 09:06

## 2020-07-09 RX ADMIN — INSULIN LISPRO 12 UNITS: 100 INJECTION, SOLUTION INTRAVENOUS; SUBCUTANEOUS at 10:57

## 2020-07-09 RX ADMIN — Medication 10 ML: at 09:00

## 2020-07-09 RX ADMIN — OXYCODONE HYDROCHLORIDE AND ACETAMINOPHEN 2 TABLET: 5; 325 TABLET ORAL at 00:31

## 2020-07-09 RX ADMIN — GABAPENTIN 400 MG: 400 CAPSULE ORAL at 09:06

## 2020-07-09 RX ADMIN — ARIPIPRAZOLE 10 MG: 10 TABLET ORAL at 09:06

## 2020-07-09 RX ADMIN — CHLORPHENIRAMINE MALEATE 250 MG: 4 TABLET ORAL at 09:07

## 2020-07-09 RX ADMIN — CITALOPRAM 40 MG: 20 TABLET, FILM COATED ORAL at 09:07

## 2020-07-09 ASSESSMENT — PAIN SCALES - GENERAL
PAINLEVEL_OUTOF10: 9
PAINLEVEL_OUTOF10: 10
PAINLEVEL_OUTOF10: 10

## 2020-07-09 ASSESSMENT — PAIN DESCRIPTION - LOCATION: LOCATION: GENERALIZED

## 2020-07-09 NOTE — DISCHARGE SUMMARY
6 hours as needed for Pain for up to 7 days. Intended supply: 7 days.  Take lowest dose possible to manage pain             SITagliptin (JANUVIA) 100 MG tablet  Take 1 tablet by mouth daily             traZODone (DESYREL) 150 MG tablet  Take 1 tablet by mouth nightly                 Diet:  DIET CARB CONTROL; , Advance as tolerated     Activity:  As tolerated    Consultants: None    Procedures:  Not indicated     Diagnostic Test:   Results for orders placed or performed during the hospital encounter of 07/07/20   Lipase   Result Value Ref Range    Lipase 52 13 - 60 U/L   Comprehensive Metabolic Panel w/ Reflex to MG   Result Value Ref Range    Glucose 428 (HH) 70 - 99 mg/dL    BUN 19 6 - 20 mg/dL    CREATININE 1.02 (H) 0.50 - 0.90 mg/dL    Bun/Cre Ratio NOT REPORTED 9 - 20    Calcium 9.5 8.6 - 10.4 mg/dL    Sodium 137 135 - 144 mmol/L    Potassium 4.1 3.7 - 5.3 mmol/L    Chloride 99 98 - 107 mmol/L    CO2 22 20 - 31 mmol/L    Anion Gap 16 9 - 17 mmol/L    Alkaline Phosphatase 93 35 - 104 U/L    ALT 11 5 - 33 U/L    AST 13 <32 U/L    Total Bilirubin 0.26 (L) 0.3 - 1.2 mg/dL    Total Protein 7.7 6.4 - 8.3 g/dL    Alb 4.2 3.5 - 5.2 g/dL    Albumin/Globulin Ratio 1.2 1.0 - 2.5    GFR Non- 57 (L) >60 mL/min    GFR African American >60 >60 mL/min    GFR Comment          GFR Staging NOT REPORTED    CBC Auto Differential   Result Value Ref Range    WBC 6.1 3.5 - 11.3 k/uL    RBC 3.81 (L) 3.95 - 5.11 m/uL    Hemoglobin 12.4 11.9 - 15.1 g/dL    Hematocrit 37.7 36.3 - 47.1 %    MCV 99.0 82.6 - 102.9 fL    MCH 32.5 25.2 - 33.5 pg    MCHC 32.9 28.4 - 34.8 g/dL    RDW 11.7 (L) 11.8 - 14.4 %    Platelets 010 331 - 931 k/uL    MPV 11.3 8.1 - 13.5 fL    NRBC Automated 0.0 0.0 per 100 WBC    Differential Type NOT REPORTED     Seg Neutrophils 40 36 - 65 %    Lymphocytes 45 (H) 24 - 43 %    Monocytes 9 3 - 12 %    Eosinophils % 5 (H) 1 - 4 %    Basophils 1 0 - 2 %    Immature Granulocytes 0 0 %    Segs Absolute 2.47 1.50 - 8.10 k/uL    Absolute Lymph # 2.75 1.10 - 3.70 k/uL    Absolute Mono # 0.52 0.10 - 1.20 k/uL    Absolute Eos # 0.30 0.00 - 0.44 k/uL    Basophils Absolute 0.08 0.00 - 0.20 k/uL    Absolute Immature Granulocyte <0.03 0.00 - 0.30 k/uL    WBC Morphology NOT REPORTED     RBC Morphology NOT REPORTED     Platelet Estimate NOT REPORTED    APTT   Result Value Ref Range    PTT 22.4 20.5 - 30.5 sec   Urinalysis Reflex to Culture   Result Value Ref Range    Color, UA YELLOW YELLOW    Turbidity UA CLEAR CLEAR    Glucose, Ur 3+ (A) NEGATIVE    Bilirubin Urine NEGATIVE NEGATIVE    Ketones, Urine TRACE (A) NEGATIVE    Specific Gravity, UA 1.029 1.005 - 1.030    Urine Hgb NEGATIVE NEGATIVE    pH, UA 5.0 5.0 - 8.0    Protein, UA NEGATIVE NEGATIVE    Urobilinogen, Urine Normal Normal    Nitrite, Urine NEGATIVE NEGATIVE    Leukocyte Esterase, Urine NEGATIVE NEGATIVE    Urinalysis Comments       Microscopic exam not performed based on chemical results unless requested in original order. Sedimentation Rate   Result Value Ref Range    Sed Rate 36 (H) 0 - 30 mm   C-REACTIVE PROTEIN   Result Value Ref Range    CRP 1.0 0.0 - 5.0 mg/L   VITAMIN B12 & FOLATE   Result Value Ref Range    Vitamin B-12 503 232 - 1245 pg/mL    Folate >20.0 >4.8 ng/mL   VITAMIN D 25 HYDROXY   Result Value Ref Range    Vit D, 25-Hydroxy 14.3 (L) 30.0 - 100.0 ng/mL   Electrophoresis Protein, Serum without Reflex to Immunofixation   Result Value Ref Range    Total Protein 5.8 (L) 6.4 - 8.3 g/dL    Albumin (calculated) 3.2 3.2 - 5.2 g/dL    Albumin % 54 45 - 65 %    Alpha-1-Globulin 0.1 0.1 - 0.4 g/dL    Alpha 1 % 2 (L) 3 - 6 %    Alpha-2-Globulin 0.9 0.5 - 0.9 g/dL    Alpha 2 % 16 (H) 6 - 13 %    Beta Globulin 0.8 0.5 - 1.1 g/dL    Beta Percent 14 11 - 19 %    Gamma Globulin 0.8 0.5 - 1.5 g/dL    Gamma Globulin % 14 9 - 20 %    Total Prot. Sum 5.8 (L) 6.3 - 8.2 g/dL    Total Prot.  Sum,% 100 98 - 102 %    Protein Electrophoresis, Serum       SLIGHTLY DECREASED TOTAL PROTEIN WITH A NORMAL ELECTROPHORETIC PATTERN. Pathologist ELECTRONICALLY SIGNED. Lynnette Nelson M.D. PROTEIN ELECTROPHORESIS, URINE   Result Value Ref Range    Specimen Type . Random Urine     Urine Total Protein 13 mg/dL    P E Interpretation, U PENDING     Pathologist PENDING    TSH with Reflex   Result Value Ref Range    TSH 0.81 0.30 - 5.00 mIU/L   SPECIMEN REJECTION   Result Value Ref Range    Specimen Source . BLOOD     Ordered Test [id number]     Reason for Rejection WAS NOT PROTECTED FROM LIGHT     - NOT REPORTED    Hemoglobin A1C   Result Value Ref Range    Hemoglobin A1C 15.3 (H) 4.0 - 6.0 %    Estimated Avg Glucose 392 mg/dL   POCT Glucose   Result Value Ref Range    Glucose 331 mg/dL    QC OK? yes    POC Glucose Fingerstick   Result Value Ref Range    POC Glucose 419 (HH) 65 - 105 mg/dL   POC Glucose Fingerstick   Result Value Ref Range    POC Glucose 331 (H) 65 - 105 mg/dL   POC Glucose Fingerstick   Result Value Ref Range    POC Glucose 380 (H) 65 - 105 mg/dL   POC Glucose Fingerstick   Result Value Ref Range    POC Glucose 235 (H) 65 - 105 mg/dL   POC Glucose Fingerstick   Result Value Ref Range    POC Glucose 261 (H) 65 - 105 mg/dL   POC Glucose Fingerstick   Result Value Ref Range    POC Glucose 321 (H) 65 - 105 mg/dL   POC Glucose Fingerstick   Result Value Ref Range    POC Glucose 358 (H) 65 - 105 mg/dL   POC Glucose Fingerstick   Result Value Ref Range    POC Glucose 124 (H) 65 - 105 mg/dL   POC Glucose Fingerstick   Result Value Ref Range    POC Glucose 427 (HH) 65 - 105 mg/dL     Xr Tibia Fibula Left (2 Views)    Result Date: 7/7/2020  EXAMINATION: XRAY VIEWS OF THE LEFT TIBIA AND FIBULA 7/7/2020 10:01 am COMPARISON: Left ankle radiographs dated 1 October 2018 HISTORY: ORDERING SYSTEM PROVIDED HISTORY: tender to palpation TECHNOLOGIST PROVIDED HISTORY: tender to palpation FINDINGS: AP and lateral of the left tibia and fibula submitted. No acute fracture or dislocation.   No BONES/ALIGNMENT: There is no evidence of an acute cervical spine fracture. There is normal alignment of the cervical spine. DEGENERATIVE CHANGES: Facet arthropathy noted at C4-5 on the right. SOFT TISSUES: There is no prevertebral soft tissue swelling. No acute CT abnormality identified in the brain. No acute osseous abnormality identified in the cervical spine. Ct Thoracic Spine Wo Contrast    Result Date: 7/7/2020  EXAMINATION: CT OF THE LUMBAR SPINE WITHOUT CONTRAST; CT OF THE THORACIC SPINE WITHOUT CONTRAST 7/7/2020 TECHNIQUE: CT of the lumbar spine was performed without the administration of intravenous contrast. Multiplanar reformatted images are provided for review. Dose modulation, iterative reconstruction, and/or weight based adjustment of the mA/kV was utilized to reduce the radiation dose to as low as reasonably achievable.; CT of the thoracic spine was performed without the administration of intravenous contrast. Multiplanar reformatted images are provided for review. Dose modulation, iterative reconstruction, and/or weight based adjustment of the mA/kV was utilized to reduce the radiation dose to as low as reasonably achievable. COMPARISON: Lumbar spine series 03/30/2019. HISTORY: ORDERING SYSTEM PROVIDED HISTORY: fall, midline back pain, subjective LLE paresthesia TECHNOLOGIST PROVIDED HISTORY: fall, midline back pain, subjective LLE paresthesia Is the patient pregnant?->No; ORDERING SYSTEM PROVIDED HISTORY: fall, midline thoracic pain TECHNOLOGIST PROVIDED HISTORY: fall, midline thoracic pain Is the patient pregnant?->No FINDINGS: BONES/ALIGNMENT: There is no acute fracture or traumatic malalignment. DEGENERATIVE CHANGES: Mild degenerative spurring in the lower thoracic spine. Lumbar disc space height is maintained with mild multilevel lumbar spondylosis. Moderate facet arthropathy at the L3-4 through L5-S1 levels. SOFT TISSUES: No paraspinal mass is identified.   Nonobstructive bilateral nephrolithiasis. No acute thoracic or lumbar spine trauma. Ct Lumbar Spine Wo Contrast    Result Date: 7/7/2020  EXAMINATION: CT OF THE LUMBAR SPINE WITHOUT CONTRAST; CT OF THE THORACIC SPINE WITHOUT CONTRAST 7/7/2020 TECHNIQUE: CT of the lumbar spine was performed without the administration of intravenous contrast. Multiplanar reformatted images are provided for review. Dose modulation, iterative reconstruction, and/or weight based adjustment of the mA/kV was utilized to reduce the radiation dose to as low as reasonably achievable.; CT of the thoracic spine was performed without the administration of intravenous contrast. Multiplanar reformatted images are provided for review. Dose modulation, iterative reconstruction, and/or weight based adjustment of the mA/kV was utilized to reduce the radiation dose to as low as reasonably achievable. COMPARISON: Lumbar spine series 03/30/2019. HISTORY: ORDERING SYSTEM PROVIDED HISTORY: fall, midline back pain, subjective LLE paresthesia TECHNOLOGIST PROVIDED HISTORY: fall, midline back pain, subjective LLE paresthesia Is the patient pregnant?->No; ORDERING SYSTEM PROVIDED HISTORY: fall, midline thoracic pain TECHNOLOGIST PROVIDED HISTORY: fall, midline thoracic pain Is the patient pregnant?->No FINDINGS: BONES/ALIGNMENT: There is no acute fracture or traumatic malalignment. DEGENERATIVE CHANGES: Mild degenerative spurring in the lower thoracic spine. Lumbar disc space height is maintained with mild multilevel lumbar spondylosis. Moderate facet arthropathy at the L3-4 through L5-S1 levels. SOFT TISSUES: No paraspinal mass is identified. Nonobstructive bilateral nephrolithiasis. No acute thoracic or lumbar spine trauma. Xr Hip 2-3 Vw W Pelvis Right    Result Date: 7/7/2020  EXAMINATION: ONE XRAY VIEW OF THE PELVIS AND TWO XRAY VIEWS RIGHT HIP 7/7/2020 10:01 am COMPARISON: None.  HISTORY: ORDERING SYSTEM PROVIDED HISTORY: tender to palpation, fall TECHNOLOGIST recognition computer software. Although all attempts are made to edit the dictation for accuracy, there may be errors in the transcription that are not intended.

## 2020-07-09 NOTE — PLAN OF CARE
Report called to India Storm at [facility] with good understanding [phone]. All questions answered. Patient transferred with belongings to Room 320.

## 2020-07-09 NOTE — PROGRESS NOTES
OBS/CDU   RESIDENT NOTE      Patients PCP is: No primary care provider on file. SUBJECTIVE      Patient reports no acute changes to her pain. She reports that she is unable to walk due to her pain, but there have been no acute changes overnight. PHYSICAL EXAM      General: NAD, AO X 3  Heent: EMOI, PERRL  Neck: SUPPLE, NO JVD  Cardiovascular: RRR, S1S2  Pulmonary: CTAB, NO SOB  Abdomen: SOFT, NTTP, ND, +BS  Extremities: +2/4 PULSES DISTAL, NO SWELLING; lower extremity strength exam limited due to patient's pain. Neuro / Psych: NO NUMBNESS OR TINGLING, MENTATION AT BASELINE    PERTINENT TEST /EXAMS      I have reviewed all available laboratory results. MEDICATIONS CURRENT   lidocaine (XYLOCAINE) 5 % ointment, PRN  oyster shell calcium/vitamin D 250-125 MG-UNIT per tablet 250 mg, Daily  oxyCODONE-acetaminophen (PERCOCET) 5-325 MG per tablet 1 tablet, Q6H PRN    Or  oxyCODONE-acetaminophen (PERCOCET) 5-325 MG per tablet 2 tablet, Q6H PRN  acetaminophen (TYLENOL) tablet 650 mg, Q4H PRN  sodium chloride flush 0.9 % injection 10 mL, 2 times per day  sodium chloride flush 0.9 % injection 10 mL, PRN  fentaNYL (SUBLIMAZE) injection 25 mcg, Once  ARIPiprazole (ABILIFY) tablet 10 mg, Daily  citalopram (CELEXA) tablet 40 mg, Daily  gabapentin (NEURONTIN) capsule 400 mg, TID  insulin lispro (HUMALOG) injection vial 0-12 Units, TID WC  insulin lispro (HUMALOG) injection vial 0-6 Units, Nightly  glucose (GLUTOSE) 40 % oral gel 15 g, PRN  dextrose 50 % IV solution, PRN  glucagon (rDNA) injection 1 mg, PRN  dextrose 5 % solution, PRN  traZODone (DESYREL) tablet 150 mg, Nightly        All medication charted and reviewed. CONSULTS      None    ASSESSMENT/PLAN       Kym Boyd is a 50 y.o. female who presents with phonic lower extremity and hip pain secondary to osteoarthritis versus diabetic neuropathy. PT and OT expressed concerns that patient will be unable to ambulate safely at home.   There is no acute

## 2020-07-09 NOTE — CARE COORDINATION
Discharge [address] Case Management Department  Written by: Griselda Hallmark, RN    Patient Name: Particia Landau  Attending Provider: Jamarcus Maloney MD  Admit Date: 2020  5:10 AM  MRN: [id number]  Account: [de-identified]                     : 1965  Discharge Date:       Disposition: SNF Continuing healthcare accepts prior to precert. [facility] arranged with ETA prior to 1400. All DC papers faxed to admissions at [facility].      Griselda Hallmark, RN

## 2020-07-09 NOTE — PROGRESS NOTES
Physical Therapy  Facility/Department: [address] MED SURG  Daily Treatment Note  NAME: Kiera Faustin  : 1965  MRN: [id number]    Date of Service: 2020    Discharge Recommendations:  Patient would benefit from continued therapy after discharge        Assessment   Body structures, Functions, Activity limitations: Decreased functional mobility ; Decreased endurance;Decreased balance;Decreased strength; Increased pain  Assessment: Pt completed 3 sit to stands with ModA and use of RW, difficulty obtaining fully erect posture d/t BLE pain and weakness. Pt will continue to require additional physical therapy upon discharage. Prognosis: Good  PT Education: Goals;PT Role;Plan of Care  REQUIRES PT FOLLOW UP: Yes  Activity Tolerance  Activity Tolerance: Patient limited by fatigue;Patient limited by pain     Patient Diagnosis(es): The primary encounter diagnosis was Generalized abdominal pain. Diagnoses of Hyperglycemia, Fall from standing, initial encounter, Generalized weakness, and Neuropathy were also pertinent to this visit.      has a past medical history of Acid reflux, Acute cystitis with hematuria, Acute non-recurrent maxillary sinusitis, Asthma, Bipolar 1 disorder ([address] Bipolar disorder, mixed ([address] BMI 34.0-34.9,adult, Cannabis use disorder, severe, dependence ([address] Cerebrovascular accident (CVA) ([address] Chest pain, Chronic renal insufficiency, Cocaine abuse ([address] DDD (degenerative disc disease), cervical, Diabetes mellitus ([address] Dizziness, Fibromyalgia, History of stroke, Homicidal ideation, Hyperglycemia, Hypertension, Hypotension, IDDM (insulin dependent diabetes mellitus) ([address] Lupus ([address] Migraine, Neuropathy, Neuropathy, Polysubstance abuse ([address] Post traumatic stress disorder (PTSD), Posttraumatic stress disorder, Recurrent depression ([address] Recurrent major depressive disorder, in partial remission ([address] Screening mammogram, encounter for, Severe recurrent major depression with psychotic features ([facility] [address] Severe recurrent major depression without psychotic features ([facility] [address] Stroke (cerebrum) ([facility] [address] Stroke ([facility] [address] Suicidal ideation, Suicidal intent, Vitamin D deficiency, and White matter changes. has a past surgical history that includes Abscess Drainage; chest tube insertion; Abdomen surgery; Cataract removal with implant (Bilateral); and LASIK (Bilateral). Restrictions  Restrictions/Precautions  Restrictions/Precautions: General Precautions, Up as Tolerated  Required Braces or Orthoses?: No  Subjective   General  Family / Caregiver Present: No  Subjective  Subjective: PT arrives with pt appearing groggy, however easily awakens and agreeable to PT. States to having 10/10 pain \"all over\"  Pain Screening  Patient Currently in Pain: Yes  Pain Assessment  Pain Assessment: 0-10  Pain Level: 10  Pain Location: Generalized  Vital Signs  Patient Currently in Pain: Yes       Orientation  Orientation  Overall Orientation Status: Within Normal Limits       Objective   Bed mobility  Supine to Sit: Contact guard assistance  Sit to Supine: Minimal assistance(for BLE assist)  Scooting: Contact guard assistance  Transfers  Sit to Stand: Moderate Assistance  Stand to sit: Moderate Assistance  Comment: Bed elevated. Pt with continuous cueing to push off of bed with BUE and reach for RW once standing. Once standing pt resting weight on elbows and difficulty standing fully erect. Pt attempted 3 sit to stands and tolerated standing for  5\", 30\", 2\" respecitivly   Ambulation  Ambulation?: No     Balance  Posture: Fair  Sitting - Static: Good  Sitting - Dynamic: Fair  Standing - Static: Poor  Exercises  Comments: Seated LE exercise program: Long Arc Quads, hip abduction/adduction, heel/toe raises, and marches. Reps: 2x10 reps.  AROM LLE, AAROM RLE                             Goals  Short term goals  Time Frame for Short term goals: 10 visits  Short term goal 1: transfers with SBA  Short term goal 2: amb 50 ft with a RW x SBA  Short term goal 3: improve strength to 4/5 throughout  Short term goal 4: exercise program x SBA  Patient Goals   Patient goals : Return home    Plan    Plan  Times per week: 5-6x wk  Current Treatment Recommendations: Strengthening, Transfer Training, Endurance Training, Gait Training, Functional Mobility Training, Pain Management, Safety Education & Training  Safety Devices  Type of devices: Left in bed, Nurse notified, Call light within reach, Bed alarm in place, Patient at risk for falls, Telesitter in use     Therapy Time   Individual Concurrent Group Co-treatment   Time In 0819         Time Out 0847         Minutes 1551 [address]

## 2020-07-10 LAB
P E INTERPRETATION, U: NORMAL
PATHOLOGIST: NORMAL
SPECIMEN TYPE: NORMAL
URINE TOTAL PROTEIN: 13 MG/DL

## 2020-07-11 LAB — VITAMIN B6: 30.8 NMOL/L (ref 20–125)

## 2020-07-17 NOTE — PROGRESS NOTES
[facility]  CDU / OBSERVATION eNCOUnter  Attending NOte    Late note for 7/9/2020       I performed a history and physical examination of the patient and discussed management with the resident. I reviewed the residents note and agree with the documented findings and plan of care. Any areas of disagreement are noted on the chart. I was personally present for the key portions of any procedures. I have documented in the chart those procedures where I was not present during the key portions. I have reviewed the nurses notes. I agree with the chief complaint, past medical history, past surgical history, allergies, medications, social and family history as documented unless otherwise noted below. The Family history, social history, and ROS are effectively unchanged since admission unless noted elsewhere in the chart. Patient evaluated by PT OT. Patient for ongoing outpatient therapy. Evaluated by neurology as well. Signed off. Patient for SNF placement. Precertification completed.     Alina Redmond MD  Attending Emergency  Physician

## 2020-10-20 ENCOUNTER — HOSPITAL ENCOUNTER (OUTPATIENT)
Age: 55
Setting detail: SPECIMEN
Discharge: HOME | End: 2020-10-20
Payer: COMMERCIAL

## 2020-10-20 ENCOUNTER — OFFICE VISIT (OUTPATIENT)
Dept: FAMILY MEDICINE CLINIC | Age: 55
End: 2020-10-20
Payer: COMMERCIAL

## 2020-10-20 VITALS
WEIGHT: 244.6 LBS | HEIGHT: 66 IN | DIASTOLIC BLOOD PRESSURE: 76 MMHG | HEART RATE: 76 BPM | TEMPERATURE: 97 F | BODY MASS INDEX: 39.31 KG/M2 | SYSTOLIC BLOOD PRESSURE: 129 MMHG

## 2020-10-20 LAB
CREATININE URINE: 65 MG/DL (ref 28–217)
MICROALBUMIN/CREAT 24H UR: 13 MG/L
MICROALBUMIN/CREAT UR-RTO: 20 MCG/MG CREAT

## 2020-10-20 PROCEDURE — 99213 OFFICE O/P EST LOW 20 MIN: CPT | Performed by: STUDENT IN AN ORGANIZED HEALTH CARE EDUCATION/TRAINING PROGRAM

## 2020-10-20 PROCEDURE — G8427 DOCREV CUR MEDS BY ELIG CLIN: HCPCS | Performed by: STUDENT IN AN ORGANIZED HEALTH CARE EDUCATION/TRAINING PROGRAM

## 2020-10-20 PROCEDURE — 1036F TOBACCO NON-USER: CPT | Performed by: STUDENT IN AN ORGANIZED HEALTH CARE EDUCATION/TRAINING PROGRAM

## 2020-10-20 PROCEDURE — 2022F DILAT RTA XM EVC RTNOPTHY: CPT | Performed by: STUDENT IN AN ORGANIZED HEALTH CARE EDUCATION/TRAINING PROGRAM

## 2020-10-20 PROCEDURE — 3017F COLORECTAL CA SCREEN DOC REV: CPT | Performed by: STUDENT IN AN ORGANIZED HEALTH CARE EDUCATION/TRAINING PROGRAM

## 2020-10-20 PROCEDURE — G8417 CALC BMI ABV UP PARAM F/U: HCPCS | Performed by: STUDENT IN AN ORGANIZED HEALTH CARE EDUCATION/TRAINING PROGRAM

## 2020-10-20 PROCEDURE — G8484 FLU IMMUNIZE NO ADMIN: HCPCS | Performed by: STUDENT IN AN ORGANIZED HEALTH CARE EDUCATION/TRAINING PROGRAM

## 2020-10-20 PROCEDURE — 3046F HEMOGLOBIN A1C LEVEL >9.0%: CPT | Performed by: STUDENT IN AN ORGANIZED HEALTH CARE EDUCATION/TRAINING PROGRAM

## 2020-10-20 RX ORDER — BUDESONIDE AND FORMOTEROL FUMARATE DIHYDRATE 80; 4.5 UG/1; UG/1
2 AEROSOL RESPIRATORY (INHALATION) 2 TIMES DAILY
Qty: 1 INHALER | Refills: 5 | Status: SHIPPED | OUTPATIENT
Start: 2020-10-20 | End: 2022-03-15 | Stop reason: SDUPTHER

## 2020-10-20 RX ORDER — GABAPENTIN 100 MG/1
CAPSULE ORAL
COMMUNITY
Start: 2020-10-16 | End: 2020-10-20

## 2020-10-20 RX ORDER — CETIRIZINE HYDROCHLORIDE 10 MG/1
10 TABLET ORAL DAILY
Qty: 30 TABLET | Refills: 0 | Status: SHIPPED | OUTPATIENT
Start: 2020-10-20 | End: 2020-11-18 | Stop reason: SDUPTHER

## 2020-10-20 RX ORDER — ALBUTEROL SULFATE 90 UG/1
2 AEROSOL, METERED RESPIRATORY (INHALATION) EVERY 4 HOURS PRN
Qty: 1 INHALER | Refills: 5 | Status: SHIPPED | OUTPATIENT
Start: 2020-10-20 | End: 2022-03-15 | Stop reason: SDUPTHER

## 2020-10-20 RX ORDER — DEXAMETHASONE 4 MG/1
2 TABLET ORAL 2 TIMES DAILY
Qty: 1 INHALER | Refills: 3 | Status: SHIPPED | OUTPATIENT
Start: 2020-10-20 | End: 2022-09-08

## 2020-10-20 RX ORDER — DEXAMETHASONE 4 MG/1
TABLET ORAL
COMMUNITY
Start: 2020-09-21 | End: 2020-10-20 | Stop reason: SDUPTHER

## 2020-10-20 RX ORDER — ARIPIPRAZOLE 10 MG/1
10 TABLET ORAL DAILY
Qty: 30 TABLET | Refills: 0 | Status: SHIPPED | OUTPATIENT
Start: 2020-10-20 | End: 2020-11-18 | Stop reason: SDUPTHER

## 2020-10-20 RX ORDER — BLOOD-GLUCOSE METER
KIT MISCELLANEOUS
COMMUNITY
Start: 2020-09-25 | End: 2020-11-11 | Stop reason: SDUPTHER

## 2020-10-20 RX ORDER — BUDESONIDE AND FORMOTEROL FUMARATE DIHYDRATE 80; 4.5 UG/1; UG/1
2 AEROSOL RESPIRATORY (INHALATION) 2 TIMES DAILY
COMMUNITY
End: 2020-10-20 | Stop reason: SDUPTHER

## 2020-10-20 RX ORDER — GABAPENTIN 400 MG/1
CAPSULE ORAL
COMMUNITY
Start: 2020-09-24 | End: 2020-10-20

## 2020-10-20 RX ORDER — GLIPIZIDE 10 MG/1
10 TABLET ORAL
Qty: 60 TABLET | Refills: 3 | Status: SHIPPED | OUTPATIENT
Start: 2020-10-20 | End: 2020-11-18 | Stop reason: SDUPTHER

## 2020-10-20 RX ORDER — BENZONATATE 200 MG/1
CAPSULE ORAL
COMMUNITY
Start: 2020-08-17 | End: 2021-06-29

## 2020-10-20 RX ORDER — ACETAMINOPHEN 325 MG/1
650 TABLET ORAL EVERY 6 HOURS PRN
Qty: 30 TABLET | Refills: 0 | Status: SHIPPED | OUTPATIENT
Start: 2020-10-20 | End: 2020-11-06 | Stop reason: SDUPTHER

## 2020-10-20 RX ORDER — GABAPENTIN 300 MG/1
900 CAPSULE ORAL 3 TIMES DAILY
Qty: 90 CAPSULE | Refills: 0 | Status: SHIPPED | OUTPATIENT
Start: 2020-10-20 | End: 2020-11-03

## 2020-10-20 RX ORDER — OMEPRAZOLE 20 MG/1
CAPSULE, DELAYED RELEASE ORAL
COMMUNITY
Start: 2020-10-05 | End: 2020-11-17 | Stop reason: SDUPTHER

## 2020-10-20 RX ORDER — CELECOXIB 200 MG/1
CAPSULE ORAL
COMMUNITY
Start: 2020-10-10 | End: 2021-06-29

## 2020-10-20 RX ORDER — ALBUTEROL SULFATE 90 UG/1
AEROSOL, METERED RESPIRATORY (INHALATION)
COMMUNITY
Start: 2020-09-23 | End: 2020-10-20 | Stop reason: SDUPTHER

## 2020-10-20 RX ORDER — LANOLIN ALCOHOL/MO/W.PET/CERES
3 CREAM (GRAM) TOPICAL DAILY
Qty: 30 TABLET | Refills: 3 | Status: SHIPPED | OUTPATIENT
Start: 2020-10-20 | End: 2021-06-29

## 2020-10-20 RX ORDER — LANCETS 28 GAUGE
EACH MISCELLANEOUS
COMMUNITY
Start: 2020-09-25 | End: 2020-11-11 | Stop reason: SDUPTHER

## 2020-10-20 ASSESSMENT — PATIENT HEALTH QUESTIONNAIRE - PHQ9
SUM OF ALL RESPONSES TO PHQ QUESTIONS 1-9: 0
SUM OF ALL RESPONSES TO PHQ QUESTIONS 1-9: 0
2. FEELING DOWN, DEPRESSED OR HOPELESS: 0
1. LITTLE INTEREST OR PLEASURE IN DOING THINGS: 0
SUM OF ALL RESPONSES TO PHQ QUESTIONS 1-9: 0
DEPRESSION UNABLE TO ASSESS: FUNCTIONAL CAPACITY MOTIVATION LIMITS ACCURACY
SUM OF ALL RESPONSES TO PHQ9 QUESTIONS 1 & 2: 0

## 2020-10-20 ASSESSMENT — ENCOUNTER SYMPTOMS
SINUS PRESSURE: 0
DIARRHEA: 0
NAUSEA: 0
SORE THROAT: 0
WHEEZING: 0
COLOR CHANGE: 0
ANAL BLEEDING: 0
SINUS PAIN: 0
ABDOMINAL PAIN: 0
COUGH: 0
CHEST TIGHTNESS: 0
SHORTNESS OF BREATH: 0
ABDOMINAL DISTENTION: 0

## 2020-10-20 NOTE — PROGRESS NOTES
Visit Information    Have you changed or started any medications since your last visit including any over-the-counter medicines, vitamins, or herbal medicines? no   Have you stopped taking any of your medications? Is so, why? -  yes - see list  Are you having any side effects from any of your medications? - no    Have you seen any other physician or provider since your last visit? New patient   Have you had any other diagnostic tests since your last visit?  no   Have you been seen in the emergency room and/or had an admission in a hospital since we last saw you?  no   Have you had your routine dental cleaning in the past 6 months?  no     Do you have an active MyChart account? If no, what is the barrier?   No: code exp    Patient Care Team:  Lazarus Gutta, MD as Consulting Physician (Infectious Diseases)    Medical History Review  Past Medical, Family, and Social History reviewed and does not contribute to the patient presenting condition    Health Maintenance   Topic Date Due    Pneumococcal 0-64 years Vaccine (1 of 1 - PPSV23) 05/19/1973    Diabetic foot exam  05/19/1977    Diabetic retinal exam  05/19/1977    HIV screen  05/19/1982    Diabetic microalbuminuria test  05/19/1985    Hepatitis B vaccine (1 of 3 - Risk 3-dose series) 05/19/1986    Cervical cancer screen  05/19/1988    Breast cancer screen  05/19/2017    Shingles Vaccine (1 of 2) 05/19/2017    Colon cancer screen colonoscopy  05/19/2017    Flu vaccine (1) 09/01/2020    A1C test (Diabetic or Prediabetic)  10/07/2020    Lipid screen  12/18/2020    DTaP/Tdap/Td vaccine (3 - Td) 01/28/2030    Hepatitis A vaccine  Aged Out    Hib vaccine  Aged Out    Meningococcal (ACWY) vaccine  Aged Out

## 2020-10-20 NOTE — PROGRESS NOTES
I have reviewed and discussed key elements of [address] [place] [address] with the resident including plan of care and follow up and agree with the care lizandro plan. NEEDS CLOSE FOLLOW UP OF A1C. DM UNCONTROLLED BY HISTORY. MAY NEED TO DISCUSS INITIATING INSULIN THERAPY AT FOLLOW UP. Diagnosis Orders   1. Essential hypertension  Microalbumin, Ur   2. Type 2 diabetes mellitus with other kidney complication, unspecified whether long term insulin use (HCC)  Hemoglobin A1C    HM DIABETES FOOT EXAM    gabapentin (NEURONTIN) 300 MG capsule    External Referral To Diabetic Education    glipiZIDE (GLUCOTROL) 10 MG tablet    SITagliptin (JANUVIA) 100 MG tablet    metFORMIN (GLUCOPHAGE) 1000 MG tablet   3. Mild intermittent asthma with acute exacerbation  albuterol sulfate  (90 Base) MCG/ACT inhaler    FLOVENT  MCG/ACT inhaler    budesonide-formoterol (SYMBICORT) 80-4.5 MCG/ACT AERO   4. Primary insomnia  melatonin (RA MELATONIN) 3 MG TABS tablet   5.  Encounter for screening mammogram for breast cancer  ARMAND DIGITAL SCREEN W OR WO CAD BILATERAL

## 2020-10-20 NOTE — PROGRESS NOTES
Subjective:    Sarah Wesley is a 50 y.o. female with  has a past medical history of Acid reflux, Acute cystitis with hematuria, Acute non-recurrent maxillary sinusitis, Asthma, Bipolar 1 disorder ([address] Bipolar disorder, mixed ([address] BMI 34.0-34.9,adult, Cannabis use disorder, severe, dependence ([address] Cerebrovascular accident (CVA) ([address] Chest pain, Chronic renal insufficiency, Cocaine abuse ([address] DDD (degenerative disc disease), cervical, Diabetes mellitus ([address] Dizziness, Fibromyalgia, History of stroke, Homicidal ideation, Hyperglycemia, Hypertension, Hypotension, IDDM (insulin dependent diabetes mellitus), Lupus ([address] Migraine, Neuropathy, Neuropathy, Polysubstance abuse ([address] Post traumatic stress disorder (PTSD), Posttraumatic stress disorder, Recurrent depression ([address] Recurrent major depressive disorder, in partial remission ([address] Screening mammogram, encounter for, Severe recurrent major depression with psychotic features ([address] Severe recurrent major depression without psychotic features ([address] Stroke (cerebrum) ([address] Stroke ([address] Suicidal ideation, Suicidal intent, Vitamin D deficiency, and White matter changes. Presented to the office today for:  Chief Complaint   Patient presents with    New Patient     est care/ swelling in ankles/ DM, asthma, nueropathy, and lupus     Health Maintenance     no DM eye report/ mammo pended/ colonoscopy 3 yrs ago fl will provide medical release to collect/ declined hep b, flu and pneumo vaccine and shingles vaccine        HPI    Pt is presenting for establishment of care. Pt states that the ankle swelling has been going on for years and is now getting worse. Denies any SOB, chest pain, difficulty breathing etc. Pt is also requesting medication refills. Review of Systems   Constitutional: Negative for fatigue and fever. HENT: Negative for sinus pressure, sinus pain, sore throat and tinnitus. Eyes: Negative for visual disturbance. Respiratory: Negative for cough, chest tightness, shortness of breath and wheezing. Cardiovascular: Positive for leg swelling. Negative for chest pain and palpitations. Gastrointestinal: Negative for abdominal distention, abdominal pain, anal bleeding, diarrhea and nausea. Genitourinary: Negative for enuresis, frequency and urgency. Musculoskeletal: Negative for arthralgias, gait problem and neck stiffness. Skin: Negative for color change and rash. Neurological: Negative for dizziness and headaches. Psychiatric/Behavioral: Negative for agitation and confusion. The patient has a   Family History   Problem Relation Age of Onset    Diabetes Mother     Stroke Mother     Diabetes Father     Diabetes Sister     Diabetes Brother     Other Son         GSW    No Known Problems Sister        Objective:    /76 (Site: Left Upper Arm, Position: Sitting, Cuff Size: Medium Adult) Comment: machine  Pulse 76   Temp 97 °F (36.1 °C) (Temporal)   Ht 5' 6\" (1.676 m)   Wt 244 lb 9.6 oz (110.9 kg)   LMP  (LMP Unknown)   BMI 39.48 kg/m²    BP Readings from Last 3 Encounters:   10/20/20 129/76   07/08/20 114/71   03/30/19 (!) 186/105       Physical Exam  Constitutional:       Appearance: Normal appearance. HENT:      Head: Atraumatic. Mouth/Throat:      Mouth: Mucous membranes are moist.      Pharynx: No oropharyngeal exudate or posterior oropharyngeal erythema. Eyes:      Extraocular Movements: Extraocular movements intact. Neck:      Musculoskeletal: Normal range of motion. Cardiovascular:      Rate and Rhythm: Normal rate and regular rhythm. Heart sounds: No murmur. No friction rub. No gallop. Pulmonary:      Effort: Pulmonary effort is normal.      Breath sounds: No wheezing, rhonchi or rales. Abdominal:      General: Abdomen is flat. Tenderness: There is no abdominal tenderness. There is no guarding. Hernia: No hernia is present.    Musculoskeletal: Normal range of motion. General: No swelling or tenderness. Right lower leg: Edema present. Left lower leg: Edema present. Skin:     General: Skin is warm. Capillary Refill: Capillary refill takes less than 2 seconds. Coloration: Skin is not jaundiced. Findings: No bruising. Neurological:      Mental Status: She is alert and oriented to person, place, and time. Sensory: Sensory deficit (b/l feet ) present. Lab Results   Component Value Date    WBC 6.1 07/07/2020    HGB 12.4 07/07/2020    HCT 37.7 07/07/2020     07/07/2020    CHOL 190 12/18/2019    TRIG 90 12/18/2019    HDL 72 12/18/2019    ALT 11 07/07/2020    AST 13 07/07/2020     07/07/2020    K 4.1 07/07/2020    CL 99 07/07/2020    CREATININE 1.02 (H) 07/07/2020    BUN 19 07/07/2020    CO2 22 07/07/2020    TSH 0.81 07/08/2020    LABA1C 15.3 (H) 07/07/2020     Lab Results   Component Value Date    CALCIUM 9.5 07/07/2020     Lab Results   Component Value Date    LDLCHOLESTEROL 100 12/18/2019       Assessment and Plan:    1. Essential hypertension  - controlled, 129/76 today  - Microalbumin, Ur; Future    2. Type 2 diabetes mellitus with other kidney complication, unspecified whether long term insulin use (HCC)  - Hemoglobin A1C; Future  -  DIABETES FOOT EXAM - sensory deficits b/l feet  - gabapentin (NEURONTIN) 300 MG capsule; Take 3 capsules by mouth 3 times daily for 30 days. Dispense: 90 capsule; Refill: 0  - External Referral To Diabetic Education  - glipiZIDE (GLUCOTROL) 10 MG tablet; Take 1 tablet by mouth 2 times daily (before meals)  Dispense: 60 tablet; Refill: 3  - SITagliptin (JANUVIA) 100 MG tablet; Take 1 tablet by mouth daily  Dispense: 30 tablet; Refill: 3  - metFORMIN (GLUCOPHAGE) 1000 MG tablet; Take 1 tablet by mouth daily (with breakfast)  Dispense: 60 tablet; Refill: 0  - f/u in 2 weeks, most likely needs to be started on insulin therapy    3.  Mild intermittent asthma with acute exacerbation  - albuterol sulfate  (90 Base) MCG/ACT inhaler; Inhale 2 puffs into the lungs every 4 hours as needed for Wheezing  Dispense: 1 Inhaler; Refill: 5  - FLOVENT  MCG/ACT inhaler; Inhale 2 puffs into the lungs 2 times daily  Dispense: 1 Inhaler; Refill: 3  - budesonide-formoterol (SYMBICORT) 80-4.5 MCG/ACT AERO; Inhale 2 puffs into the lungs 2 times daily  Dispense: 1 Inhaler; Refill: 5    4. Primary insomnia  - melatonin (RA MELATONIN) 3 MG TABS tablet; Take 1 tablet by mouth daily  Dispense: 30 tablet; Refill: 3  - pt is already on trazadone, will try melatonin. If melatonin provides relief from insomnia then will dc trazadone and continue with melatonin    5. Encounter for screening mammogram for breast cancer  - ARMAND DIGITAL SCREEN W OR WO CAD BILATERAL; Future          Requested Prescriptions     Signed Prescriptions Disp Refills    gabapentin (NEURONTIN) 300 MG capsule 90 capsule 0     Sig: Take 3 capsules by mouth 3 times daily for 30 days.     cetirizine (ZYRTEC) 10 MG tablet 30 tablet 0     Sig: Take 1 tablet by mouth daily    albuterol sulfate  (90 Base) MCG/ACT inhaler 1 Inhaler 5     Sig: Inhale 2 puffs into the lungs every 4 hours as needed for Wheezing    FLOVENT  MCG/ACT inhaler 1 Inhaler 3     Sig: Inhale 2 puffs into the lungs 2 times daily    glipiZIDE (GLUCOTROL) 10 MG tablet 60 tablet 3     Sig: Take 1 tablet by mouth 2 times daily (before meals)    SITagliptin (JANUVIA) 100 MG tablet 30 tablet 3     Sig: Take 1 tablet by mouth daily    metFORMIN (GLUCOPHAGE) 1000 MG tablet 60 tablet 0     Sig: Take 1 tablet by mouth daily (with breakfast)    acetaminophen (TYLENOL) 325 MG tablet 30 tablet 0     Sig: Take 2 tablets by mouth every 6 hours as needed for Pain    budesonide-formoterol (SYMBICORT) 80-4.5 MCG/ACT AERO 1 Inhaler 5     Sig: Inhale 2 puffs into the lungs 2 times daily    ARIPiprazole (ABILIFY) 10 MG tablet 30 tablet 0     Sig: Take 1 tablet by mouth daily    melatonin (RA MELATONIN) 3 MG TABS tablet 30 tablet 3     Sig: Take 1 tablet by mouth daily       Medications Discontinued During This Encounter   Medication Reason    ibuprofen (ADVIL;MOTRIN) 800 MG tablet LIST CLEANUP    gabapentin (NEURONTIN) 100 MG capsule LIST CLEANUP    gabapentin (NEURONTIN) 400 MG capsule LIST CLEANUP    gabapentin (NEURONTIN) 300 MG capsule LIST CLEANUP    albuterol sulfate  (90 Base) MCG/ACT inhaler REORDER    FLOVENT  MCG/ACT inhaler REORDER    glipiZIDE (GLUCOTROL) 10 MG tablet REORDER    SITagliptin (JANUVIA) 100 MG tablet REORDER    metFORMIN (GLUCOPHAGE) 1000 MG tablet REORDER    acetaminophen (TYLENOL) 325 MG tablet REORDER    budesonide-formoterol (SYMBICORT) 80-4.5 MCG/ACT AERO REORDER    ARIPiprazole (ABILIFY) 10 MG tablet REORDER       Terri received counseling on the following healthy behaviors: nutrition, exercise and medication adherence    Discussed use,benefit, and side effects of prescribed medications. Barriers to medication compliance addressed. All patient questions answered. Pt voiced understanding. Return in about 2 weeks (around 11/3/2020) for DMT2. Disclaimer: Some orall of this note was transcribed using voice-recognition software. This may cause typographical errors occasionally. Although all effort is made to fix these errors, please do not hesitate to contact our office if there Patsi Favorite concern with the understanding of this note.

## 2020-10-20 NOTE — PATIENT INSTRUCTIONS
Thank you for letting us take care of you today. We hope all your questions were addressed. If a question was overlooked or something else comes to mind after you return home, please contact a member of your Care Team listed below. Your Care Team at [place] [address] is Team #4  Marilee Morales MD (Faculty)  Amy Blake MD (Resident)  Anatoliy Woodruff MD (Resident)  Marga Coffey MD (Resident)  Charity Guerra MD (Resident)  MAREK Pimentel RMA [address] [facility] office)  Rosalba Dillon, [address] [facility] (Clinical Practice Manager)  Piero Hess [facility] (Clinical Pharmacist)       Office phone number: [phone]    If you need to get in right away due to illness, please be advised we have \"Same Day\" appointments available Monday-Friday. Please call us at [phone] option #3 to schedule your \"Same Day\" appointment.

## 2020-10-29 ENCOUNTER — APPOINTMENT (OUTPATIENT)
Dept: GENERAL RADIOLOGY | Age: 55
End: 2020-10-29
Payer: COMMERCIAL

## 2020-10-29 ENCOUNTER — HOSPITAL ENCOUNTER (OUTPATIENT)
Age: 55
Setting detail: OBSERVATION
Discharge: HOME | End: 2020-10-30
Attending: EMERGENCY MEDICINE | Admitting: EMERGENCY MEDICINE
Payer: COMMERCIAL

## 2020-10-29 LAB
ABSOLUTE EOS #: 0.21 K/UL (ref 0–0.44)
ABSOLUTE IMMATURE GRANULOCYTE: <0.03 K/UL (ref 0–0.3)
ABSOLUTE LYMPH #: 1.95 K/UL (ref 1.1–3.7)
ABSOLUTE MONO #: 0.54 K/UL (ref 0.1–1.2)
ANION GAP SERPL CALCULATED.3IONS-SCNC: 12 MMOL/L (ref 9–17)
BASOPHILS # BLD: 1 % (ref 0–2)
BASOPHILS ABSOLUTE: 0.05 K/UL (ref 0–0.2)
BNP INTERPRETATION: NORMAL
BUN BLDV-MCNC: 27 MG/DL (ref 6–20)
BUN/CREAT BLD: ABNORMAL (ref 9–20)
CALCIUM SERPL-MCNC: 9.4 MG/DL (ref 8.6–10.4)
CHLORIDE BLD-SCNC: 104 MMOL/L (ref 98–107)
CO2: 21 MMOL/L (ref 20–31)
CREAT SERPL-MCNC: 1 MG/DL (ref 0.5–0.9)
DIFFERENTIAL TYPE: ABNORMAL
EOSINOPHILS RELATIVE PERCENT: 4 % (ref 1–4)
GFR AFRICAN AMERICAN: >60 ML/MIN
GFR NON-AFRICAN AMERICAN: 58 ML/MIN
GFR SERPL CREATININE-BSD FRML MDRD: ABNORMAL ML/MIN/{1.73_M2}
GFR SERPL CREATININE-BSD FRML MDRD: ABNORMAL ML/MIN/{1.73_M2}
GLUCOSE BLD-MCNC: 84 MG/DL (ref 70–99)
HCT VFR BLD CALC: 34.2 % (ref 36.3–47.1)
HEMOGLOBIN: 10.6 G/DL (ref 11.9–15.1)
IMMATURE GRANULOCYTES: 0 %
LYMPHOCYTES # BLD: 41 % (ref 24–43)
MCH RBC QN AUTO: 32.4 PG (ref 25.2–33.5)
MCHC RBC AUTO-ENTMCNC: 31 G/DL (ref 28.4–34.8)
MCV RBC AUTO: 104.6 FL (ref 82.6–102.9)
MONOCYTES # BLD: 11 % (ref 3–12)
NRBC AUTOMATED: 0 PER 100 WBC
PDW BLD-RTO: 12 % (ref 11.8–14.4)
PLATELET # BLD: 297 K/UL (ref 138–453)
PLATELET ESTIMATE: ABNORMAL
PMV BLD AUTO: 10 FL (ref 8.1–13.5)
POTASSIUM SERPL-SCNC: 4.8 MMOL/L (ref 3.7–5.3)
PRO-BNP: 48 PG/ML
RBC # BLD: 3.27 M/UL (ref 3.95–5.11)
RBC # BLD: ABNORMAL 10*6/UL
SEG NEUTROPHILS: 43 % (ref 36–65)
SEGMENTED NEUTROPHILS ABSOLUTE COUNT: 2.02 K/UL (ref 1.5–8.1)
SODIUM BLD-SCNC: 137 MMOL/L (ref 135–144)
TROPONIN INTERP: NORMAL
TROPONIN INTERP: NORMAL
TROPONIN T: NORMAL NG/ML
TROPONIN T: NORMAL NG/ML
TROPONIN, HIGH SENSITIVITY: 11 NG/L (ref 0–14)
TROPONIN, HIGH SENSITIVITY: 13 NG/L (ref 0–14)
WBC # BLD: 4.8 K/UL (ref 3.5–11.3)
WBC # BLD: ABNORMAL 10*3/UL

## 2020-10-29 PROCEDURE — G0378 HOSPITAL OBSERVATION PER HR: HCPCS

## 2020-10-29 PROCEDURE — 99285 EMERGENCY DEPT VISIT HI MDM: CPT

## 2020-10-29 PROCEDURE — 71045 X-RAY EXAM CHEST 1 VIEW: CPT

## 2020-10-29 PROCEDURE — 6370000000 HC RX 637 (ALT 250 FOR IP): Performed by: STUDENT IN AN ORGANIZED HEALTH CARE EDUCATION/TRAINING PROGRAM

## 2020-10-29 PROCEDURE — 84484 ASSAY OF TROPONIN QUANT: CPT

## 2020-10-29 PROCEDURE — 2580000003 HC RX 258: Performed by: STUDENT IN AN ORGANIZED HEALTH CARE EDUCATION/TRAINING PROGRAM

## 2020-10-29 PROCEDURE — 6370000000 HC RX 637 (ALT 250 FOR IP): Performed by: EMERGENCY MEDICINE

## 2020-10-29 PROCEDURE — 80048 BASIC METABOLIC PNL TOTAL CA: CPT

## 2020-10-29 PROCEDURE — 93970 EXTREMITY STUDY: CPT

## 2020-10-29 PROCEDURE — 83880 ASSAY OF NATRIURETIC PEPTIDE: CPT

## 2020-10-29 PROCEDURE — 93005 ELECTROCARDIOGRAM TRACING: CPT | Performed by: STUDENT IN AN ORGANIZED HEALTH CARE EDUCATION/TRAINING PROGRAM

## 2020-10-29 PROCEDURE — 85025 COMPLETE CBC W/AUTO DIFF WBC: CPT

## 2020-10-29 RX ORDER — CITALOPRAM 20 MG/1
40 TABLET ORAL DAILY
Status: DISCONTINUED | OUTPATIENT
Start: 2020-10-29 | End: 2020-10-30 | Stop reason: HOSPADM

## 2020-10-29 RX ORDER — ONDANSETRON 2 MG/ML
4 INJECTION INTRAMUSCULAR; INTRAVENOUS EVERY 6 HOURS PRN
Status: DISCONTINUED | OUTPATIENT
Start: 2020-10-29 | End: 2020-10-30 | Stop reason: HOSPADM

## 2020-10-29 RX ORDER — BENZONATATE 100 MG/1
100 CAPSULE ORAL EVERY 4 HOURS PRN
Status: DISCONTINUED | OUTPATIENT
Start: 2020-10-29 | End: 2020-10-30 | Stop reason: HOSPADM

## 2020-10-29 RX ORDER — UREA 10 %
3 LOTION (ML) TOPICAL DAILY
Status: DISCONTINUED | OUTPATIENT
Start: 2020-10-29 | End: 2020-10-30 | Stop reason: HOSPADM

## 2020-10-29 RX ORDER — ALBUTEROL SULFATE 2.5 MG/3ML
2.5 SOLUTION RESPIRATORY (INHALATION) EVERY 6 HOURS PRN
Status: DISCONTINUED | OUTPATIENT
Start: 2020-10-29 | End: 2020-10-30 | Stop reason: HOSPADM

## 2020-10-29 RX ORDER — GABAPENTIN 300 MG/1
900 CAPSULE ORAL 3 TIMES DAILY
Status: DISCONTINUED | OUTPATIENT
Start: 2020-10-29 | End: 2020-10-30 | Stop reason: HOSPADM

## 2020-10-29 RX ORDER — GLIPIZIDE 10 MG/1
10 TABLET ORAL
Status: DISCONTINUED | OUTPATIENT
Start: 2020-10-30 | End: 2020-10-29

## 2020-10-29 RX ORDER — ALOGLIPTIN 6.25 MG/1
6.25 TABLET, FILM COATED ORAL DAILY
Status: DISCONTINUED | OUTPATIENT
Start: 2020-10-29 | End: 2020-10-30 | Stop reason: HOSPADM

## 2020-10-29 RX ORDER — DEXTROSE MONOHYDRATE 50 MG/ML
100 INJECTION, SOLUTION INTRAVENOUS PRN
Status: DISCONTINUED | OUTPATIENT
Start: 2020-10-29 | End: 2020-10-30 | Stop reason: HOSPADM

## 2020-10-29 RX ORDER — NICOTINE POLACRILEX 4 MG
15 LOZENGE BUCCAL PRN
Status: DISCONTINUED | OUTPATIENT
Start: 2020-10-29 | End: 2020-10-30 | Stop reason: HOSPADM

## 2020-10-29 RX ORDER — ACETAMINOPHEN 325 MG/1
650 TABLET ORAL EVERY 6 HOURS PRN
Status: DISCONTINUED | OUTPATIENT
Start: 2020-10-29 | End: 2020-10-30 | Stop reason: HOSPADM

## 2020-10-29 RX ORDER — POLYETHYLENE GLYCOL 3350 17 G/17G
17 POWDER, FOR SOLUTION ORAL DAILY
Status: DISCONTINUED | OUTPATIENT
Start: 2020-10-29 | End: 2020-10-30 | Stop reason: HOSPADM

## 2020-10-29 RX ORDER — CELECOXIB 100 MG/1
200 CAPSULE ORAL 2 TIMES DAILY
Status: DISCONTINUED | OUTPATIENT
Start: 2020-10-29 | End: 2020-10-30 | Stop reason: HOSPADM

## 2020-10-29 RX ORDER — SENNA PLUS 8.6 MG/1
1 TABLET ORAL DAILY PRN
Status: DISCONTINUED | OUTPATIENT
Start: 2020-10-29 | End: 2020-10-30 | Stop reason: HOSPADM

## 2020-10-29 RX ORDER — ARIPIPRAZOLE 10 MG/1
10 TABLET ORAL DAILY
Status: DISCONTINUED | OUTPATIENT
Start: 2020-10-29 | End: 2020-10-30 | Stop reason: HOSPADM

## 2020-10-29 RX ORDER — DEXTROSE MONOHYDRATE 25 G/50ML
12.5 INJECTION, SOLUTION INTRAVENOUS PRN
Status: DISCONTINUED | OUTPATIENT
Start: 2020-10-29 | End: 2020-10-30 | Stop reason: HOSPADM

## 2020-10-29 RX ORDER — ASPIRIN 81 MG/1
324 TABLET, CHEWABLE ORAL ONCE
Status: COMPLETED | OUTPATIENT
Start: 2020-10-29 | End: 2020-10-29

## 2020-10-29 RX ORDER — GLIPIZIDE 10 MG/1
10 TABLET ORAL
Status: DISCONTINUED | OUTPATIENT
Start: 2020-10-29 | End: 2020-10-30 | Stop reason: HOSPADM

## 2020-10-29 RX ORDER — NITROGLYCERIN 0.4 MG/1
0.4 TABLET SUBLINGUAL EVERY 5 MIN PRN
Status: DISCONTINUED | OUTPATIENT
Start: 2020-10-29 | End: 2020-10-30 | Stop reason: HOSPADM

## 2020-10-29 RX ORDER — ACETAMINOPHEN 500 MG
1000 TABLET ORAL ONCE
Status: COMPLETED | OUTPATIENT
Start: 2020-10-29 | End: 2020-10-29

## 2020-10-29 RX ORDER — BUDESONIDE AND FORMOTEROL FUMARATE DIHYDRATE 80; 4.5 UG/1; UG/1
2 AEROSOL RESPIRATORY (INHALATION) 2 TIMES DAILY
Status: DISCONTINUED | OUTPATIENT
Start: 2020-10-29 | End: 2020-10-30 | Stop reason: HOSPADM

## 2020-10-29 RX ORDER — PROMETHAZINE HYDROCHLORIDE 12.5 MG/1
12.5 TABLET ORAL EVERY 6 HOURS PRN
Status: DISCONTINUED | OUTPATIENT
Start: 2020-10-29 | End: 2020-10-30 | Stop reason: HOSPADM

## 2020-10-29 RX ORDER — FLUTICASONE PROPIONATE 110 UG/1
2 AEROSOL, METERED RESPIRATORY (INHALATION) 2 TIMES DAILY
Status: DISCONTINUED | OUTPATIENT
Start: 2020-10-29 | End: 2020-10-30

## 2020-10-29 RX ORDER — LIDOCAINE 4 G/G
1 PATCH TOPICAL DAILY
Status: DISCONTINUED | OUTPATIENT
Start: 2020-10-29 | End: 2020-10-30 | Stop reason: HOSPADM

## 2020-10-29 RX ORDER — ACETAMINOPHEN 650 MG/1
650 SUPPOSITORY RECTAL EVERY 6 HOURS PRN
Status: DISCONTINUED | OUTPATIENT
Start: 2020-10-29 | End: 2020-10-30 | Stop reason: HOSPADM

## 2020-10-29 RX ORDER — SODIUM CHLORIDE 0.9 % (FLUSH) 0.9 %
10 SYRINGE (ML) INJECTION EVERY 12 HOURS SCHEDULED
Status: DISCONTINUED | OUTPATIENT
Start: 2020-10-29 | End: 2020-10-30 | Stop reason: HOSPADM

## 2020-10-29 RX ORDER — PANTOPRAZOLE SODIUM 40 MG/1
40 TABLET, DELAYED RELEASE ORAL
Status: DISCONTINUED | OUTPATIENT
Start: 2020-10-30 | End: 2020-10-30 | Stop reason: HOSPADM

## 2020-10-29 RX ORDER — NAPROXEN 250 MG/1
500 TABLET ORAL ONCE
Status: COMPLETED | OUTPATIENT
Start: 2020-10-29 | End: 2020-10-29

## 2020-10-29 RX ORDER — SODIUM CHLORIDE 0.9 % (FLUSH) 0.9 %
10 SYRINGE (ML) INJECTION PRN
Status: DISCONTINUED | OUTPATIENT
Start: 2020-10-29 | End: 2020-10-30 | Stop reason: HOSPADM

## 2020-10-29 RX ORDER — CETIRIZINE HYDROCHLORIDE 10 MG/1
10 TABLET ORAL DAILY
Status: DISCONTINUED | OUTPATIENT
Start: 2020-10-29 | End: 2020-10-30 | Stop reason: HOSPADM

## 2020-10-29 RX ADMIN — NITROGLYCERIN 0.4 MG: 0.4 TABLET SUBLINGUAL at 15:36

## 2020-10-29 RX ADMIN — NAPROXEN 500 MG: 250 TABLET ORAL at 15:53

## 2020-10-29 RX ADMIN — ACETAMINOPHEN 1000 MG: 500 TABLET ORAL at 15:36

## 2020-10-29 RX ADMIN — SODIUM CHLORIDE, PRESERVATIVE FREE 10 ML: 5 INJECTION INTRAVENOUS at 20:57

## 2020-10-29 RX ADMIN — Medication 3 MG: at 20:55

## 2020-10-29 RX ADMIN — GABAPENTIN 900 MG: 300 CAPSULE ORAL at 20:55

## 2020-10-29 RX ADMIN — GLIPIZIDE 10 MG: 10 TABLET ORAL at 21:28

## 2020-10-29 RX ADMIN — CELECOXIB 200 MG: 100 CAPSULE ORAL at 20:55

## 2020-10-29 RX ADMIN — ASPIRIN 324 MG: 81 TABLET, CHEWABLE ORAL at 14:47

## 2020-10-29 RX ADMIN — NITROGLYCERIN 0.4 MG: 0.4 TABLET SUBLINGUAL at 17:59

## 2020-10-29 RX ADMIN — TRAZODONE HYDROCHLORIDE 150 MG: 100 TABLET ORAL at 20:55

## 2020-10-29 ASSESSMENT — PAIN DESCRIPTION - LOCATION
LOCATION: CHEST
LOCATION: CHEST

## 2020-10-29 ASSESSMENT — PAIN SCALES - GENERAL
PAINLEVEL_OUTOF10: 9
PAINLEVEL_OUTOF10: 8
PAINLEVEL_OUTOF10: 9
PAINLEVEL_OUTOF10: 10
PAINLEVEL_OUTOF10: 8

## 2020-10-29 ASSESSMENT — ENCOUNTER SYMPTOMS
VOMITING: 0
SHORTNESS OF BREATH: 0
COUGH: 0
SORE THROAT: 0
EYE PAIN: 0
ABDOMINAL PAIN: 0
SINUS PAIN: 0
BACK PAIN: 0
DIARRHEA: 0
NAUSEA: 0

## 2020-10-29 ASSESSMENT — PAIN DESCRIPTION - PAIN TYPE
TYPE: ACUTE PAIN
TYPE: ACUTE PAIN

## 2020-10-29 ASSESSMENT — PAIN DESCRIPTION - ORIENTATION
ORIENTATION: LEFT
ORIENTATION: LEFT;MID

## 2020-10-29 ASSESSMENT — PAIN DESCRIPTION - DESCRIPTORS: DESCRIPTORS: STABBING

## 2020-10-29 ASSESSMENT — PAIN DESCRIPTION - ONSET: ONSET: SUDDEN

## 2020-10-29 ASSESSMENT — PAIN DESCRIPTION - FREQUENCY: FREQUENCY: CONTINUOUS

## 2020-10-29 NOTE — ED NOTES
Came in with [facility] c/o stabbing chest pain that set in an hour ago before calling [facility]. Pt is a&o x4 and in no respiratory distress.  HR and rhythm normal.       Pasquale Wynne RN  10/29/20 [id number]

## 2020-10-29 NOTE — ED PROVIDER NOTES
[facility]     Emergency Department     Faculty Attestation    I performed a history and physical examination of the patient and discussed management with the resident. I reviewed the residents note and agree with the documented findings including all diagnostic interpretations and plan of care. Any areas of disagreement are noted on the chart. I was personally present for the key portions of any procedures. I have documented in the chart those procedures where I was not present during the key portions. I have reviewed the emergency nurses triage note. I agree with the chief complaint, past medical history, past surgical history, allergies, medications, social and family history as documented unless otherwise noted below. Documentation of the HPI, Physical Exam and Medical Decision Making performed by scribjesika is based on my personal performance of the HPI, PE and MDM. For Physician Assistant/ Nurse Practitioner cases/documentation I have personally evaluated this patient and have completed at least one if not all key elements of the E/M (history, physical exam, and MDM). Additional findings are as noted. This patient was evaluated in the Emergency Department for symptoms described in the history of present illness. He/she was evaluated in the context of the global COVID-19 pandemic, which necessitated consideration that the patient might be at risk for infection with the SARS-CoV-2 virus that causes COVID-19. Institutional protocols and algorithms that pertain to the evaluation of patients at risk for COVID-19 are in a state of rapid change based on information released by regulatory bodies including the CDC and federal and state organizations. These policies and algorithms were followed during the patient's care in the ED. Primary Care Physician: Oliver Marcelo MD    History:  This is a 50 y.o. female who presents to the Emergency Department with complaint of chest pain. Substernal pressure. Improved with nitro. Some mild shortness of breath with this. No fevers no cough. No nausea or vomiting. Has noticed some mild swelling to her right leg. No history of DVT or PE. Physical:     height is 5' 6\" (1.676 m) and weight is 250 lb (113.4 kg). Her blood pressure is 121/92 (abnormal) and her pulse is 77. Her respiration is 17 and oxygen saturation is 98%.    50 y.o. female no acute distress, cardiac exam regular rate and rhythm no murmurs rubs gallops, pulmonary clear bilaterally.   Trace pretibial edema    Impression: Chest pain    Plan: Cardiac work-up, Doppler lower extremities, likely admission    EKG Interpretation    Interpreted by me    Rhythm: normal sinus   Rate: normal  Axis: normal  Ectopy: none  Conduction: normal  ST Segments: no acute change  T Waves: no acute change  Q Waves: none    Clinical Impression: no acute changes and normal EKG    Bill Bond MD, Delmar Diallo  Attending Emergency Physician         Dillan Vasquez MD  10/29/20 7030

## 2020-10-29 NOTE — ED PROVIDER NOTES
[facility]  ED  Emergency Department Encounter  EmergencyMedicineResident     This patient was seen during the COVID-19 crisis. There were limited resources and those resources we did have had to be conserved for the sickest of patients. Pt Name: Lisa Duenas  MRN: [id number]  [place] 1965  Date of evaluation: 10/29/20  PCP: Jose Osborn MD    [address] [place] [address]       Chief Complaint   Patient presents with    Chest Pain       HISTORY OF PRESENT ILLNESS  (Location/Symptom, Timing/Onset, Context/Setting, Quality, Duration, Modifying Factors, Severity.)      Lisa Duenas is a 50 y.o. female who presents presents to the emergency department today for evaluation of chest pain. Patient states her chest pain began 30 minutes prior to EMS arrival.  She was standing in the kitchen grabbing the mop bucket when she experienced chest pain. Patient not have any preceding symptoms. Patient was given sublingual nitroglycerin by EMS patient reports that her pain improved, however this came back just less intense than prior. At the onset of the patient's pain it was 10 out of 10, it is now 3 or 4 out of 10. It is located over the left anterior chest wall. And it is reproducible to palpation. Patient has past medical history of asthma, diabetes, morbid obesity and former crack cocaine abuser. Patient reports she is clean right now.     PAST MEDICAL / SURGICAL /SOCIAL / FAMILY HISTORY      has a past medical history of Acid reflux, Acute cystitis with hematuria, Acute non-recurrent maxillary sinusitis, Asthma, Bipolar 1 disorder ([address] Bipolar disorder, mixed ([address] BMI 34.0-34.9,adult, Cannabis use disorder, severe, dependence ([address] Cerebrovascular accident (CVA) ([address] Chest pain, Chronic renal insufficiency, Cocaine abuse ([address] DDD (degenerative disc disease), cervical, Diabetes mellitus ([address] Dizziness, Fibromyalgia, History of stroke, Homicidal ideation, Hyperglycemia, violence     Fear of current or ex partner: Not on file     Emotionally abused: Not on file     Physically abused: Not on file     Forced sexual activity: Not on file   Other Topics Concern    Not on file   Social History Narrative    Not on file       Family History   Problem Relation Age of Onset    Diabetes Mother     Stroke Mother     Diabetes Father     Diabetes Sister     Diabetes Brother     Other Son         GSW    No Known Problems Sister        Allergies:  Bactrim [sulfamethoxazole-trimethoprim] and Adhesive tape    Home Medications:  Prior to Admission medications    Medication Sig Start Date End Date Taking? Authorizing Provider   benzonatate (TESSALON) 200 MG capsule  8/17/20   Historical Provider, MD   celecoxib (CELEBREX) 200 MG capsule  10/10/20   Historical Provider, MD   FREESTYLE LITE strip  9/25/20   Historical Provider, MD   FreeStyle Lancets [address]  9/25/20   Historical Provider, MD   omeprazole (PRILOSEC) 20 MG delayed release capsule  10/5/20   Historical Provider, MD   gabapentin (NEURONTIN) 300 MG capsule Take 3 capsules by mouth 3 times daily for 30 days.  10/20/20 11/19/20  Yesenia Vargas MD   cetirizine (ZYRTEC) 10 MG tablet Take 1 tablet by mouth daily 10/20/20 11/19/20  Yesenia Vargas MD   albuterol sulfate  (90 Base) MCG/ACT inhaler Inhale 2 puffs into the lungs every 4 hours as needed for Wheezing 10/20/20 11/20/20  Yesenia Vargas MD   FLOVENT  MCG/ACT inhaler Inhale 2 puffs into the lungs 2 times daily 10/20/20   Yesenia Vargas MD   glipiZIDE (GLUCOTROL) 10 MG tablet Take 1 tablet by mouth 2 times daily (before meals) 10/20/20   Yesenia Vargas MD   SITagliptin (JANUVIA) 100 MG tablet Take 1 tablet by mouth daily 10/20/20   Yesenia Vargas MD   metFORMIN (GLUCOPHAGE) 1000 MG tablet Take 1 tablet by mouth daily (with breakfast) 10/20/20   Yesenia Vargas MD   acetaminophen (TYLENOL) 325 MG tablet Take 2 tablets by mouth every 6 hours as needed for Pain 10/20/20   Destiny Maguire MD   budesonide-formoterol (SYMBICORT) 80-4.5 MCG/ACT AERO Inhale 2 puffs into the lungs 2 times daily 10/20/20   Destiny Maguire MD   ARIPiprazole (ABILIFY) 10 MG tablet Take 1 tablet by mouth daily 10/20/20   Destiny Maguire MD   melatonin (RA MELATONIN) 3 MG TABS tablet Take 1 tablet by mouth daily 10/20/20   Destiny Maguire MD   citalopram (CELEXA) 40 MG tablet Take 1 tablet by mouth daily 9/12/17   Cuong Hidalgo MD   traZODone (DESYREL) 150 MG tablet Take 1 tablet by mouth nightly 9/12/17   Cuong Hidalgo MD       REVIEW OF SYSTEMS    (2-9 systems for level 4, 10 or more forlevel 5)      Review of Systems   Constitutional: Negative for activity change, chills and fever. HENT: Negative for congestion, sinus pain and sore throat. Eyes: Negative for pain and visual disturbance. Respiratory: Negative for cough and shortness of breath. Cardiovascular: Positive for chest pain. Gastrointestinal: Negative for abdominal pain, diarrhea, nausea and vomiting. Genitourinary: Negative for difficulty urinating, dysuria and hematuria. Musculoskeletal: Negative for back pain and myalgias. Skin: Negative for rash and wound. Neurological: Negative for dizziness, light-headedness and headaches. Psychiatric/Behavioral: Negative for agitation and confusion. PHYSICAL EXAM   (up to 7 for level 4, 8 or more forlevel 5)      ED TRIAGE VITALS BP: (!) 121/92,  , Pulse: 77, Resp: 17, SpO2: 98 %    Vitals:    10/29/20 1345   BP: (!) 121/92   Pulse: 77   Resp: 17   SpO2: 98%   Weight: 250 lb (113.4 kg)   Height: 5' 6\" (1.676 m)         Physical Exam  Vitals signs and nursing note reviewed. Constitutional:       Appearance: Normal appearance. HENT:      Head: Normocephalic and atraumatic. Nose: Nose normal.      Mouth/Throat:      Mouth: Mucous membranes are moist.   Eyes:      Extraocular Movements: Extraocular movements intact.       Pupils: Pupils are equal, round, and reactive to light. Neck:      Musculoskeletal: Normal range of motion. Cardiovascular:      Rate and Rhythm: Normal rate and regular rhythm. Pulses: Normal pulses. Heart sounds: Normal heart sounds. Comments: Tenderness to palpation over the anterior chest wall  Pulmonary:      Effort: Pulmonary effort is normal.      Breath sounds: Normal breath sounds. Abdominal:      General: Abdomen is flat. Palpations: Abdomen is soft. Musculoskeletal: Normal range of motion. Skin:     General: Skin is warm and dry. Capillary Refill: Capillary refill takes less than 2 seconds. Neurological:      General: No focal deficit present. Mental Status: She is alert and oriented to person, place, and time.    Psychiatric:         Mood and Affect: Mood normal.         Behavior: Behavior normal.           DIFFERENTIAL  DIAGNOSIS     PLAN (LABS / IMAGING / EKG):  Orders Placed This Encounter   Procedures    XR CHEST PORTABLE    VL DUP LOWER EXTREMITY VENOUS BILATERAL    CBC Auto Differential    Basic Metabolic Panel w/ Reflex to MG    Troponin    Brain Natriuretic Peptide    Troponin    EKG 12 Lead    PATIENT STATUS (FROM ED OR OR/PROCEDURAL) Observation       MEDICATIONS ORDERED:  ED Medication Orders (From admission, onward)    Start Ordered     Status Ordering Provider    10/29/20 1545 10/29/20 1537  naproxen (NAPROSYN) tablet 500 mg  ONCE      Last MAR action:  Given - by WILBERTO Almaraz on 10/29/20 at [address], [place]ED[place]    10/29/20 1545 10/29/20 1537  lidocaine 4 % external patch 1 patch  DAILY      Last MAR action:  Patch Applied - by WILBERTO LIND on 10/29/20 at [address], [place] C[place]ED[place]    10/29/20 1530 10/29/20 1528  acetaminophen (TYLENOL) tablet 1,000 mg  ONCE      Last MAR action:  Given - by WILBERTO LIND on 10/29/20 at [facility], [place]ED[place]    10/29/20 1430 10/29/20 1415  aspirin chewable tablet 324 mg  ONCE      Last MAR action: Given - by Gagandeep Manual on 10/29/20 at [facility], [place]    10/29/20 1416 10/29/20 1417  nitroGLYCERIN (NITROSTAT) SL tablet 0.4 mg  EVERY 5 MIN PRN      Last MAR action:  Given - by Gagandeep Manual on 10/29/20 at [facility], [place]          DDX: ACS, NSTEMI, costochondritis, pericarditis, anxiety    DIAGNOSTIC RESULTS / EMERGENCY DEPARTMENT COURSE / MDM     IMPRESSION & MDM:  This is a 29-year-old female presented to the emergency department today for evaluation of left anterior chest wall pain. This came on unprovoked 30 minutes prior to arrival.  The pain relieved with some nitroglycerin use but has returned just less intense. Patient's past medical history includes obesity, diabetes, essential hypertension, and asthma. Patient denies any recent illnesses or antibiotic use. Plan will be to rule out ACS, and potentially treat for pericarditis since the pain is reproducible to palpation. This patient has chest pain that responded and resolved to nitroglycerin. Concerning for myocardial ischemia. Multimodal pain therapies were used to rule out musculoskeletal causes of chest pain. All modalities failed to produce any pain relief.     LABS:  Results for orders placed or performed during the hospital encounter of 10/29/20   CBC Auto Differential   Result Value Ref Range    WBC 4.8 3.5 - 11.3 k/uL    RBC 3.27 (L) 3.95 - 5.11 m/uL    Hemoglobin 10.6 (L) 11.9 - 15.1 g/dL    Hematocrit 34.2 (L) 36.3 - 47.1 %    .6 (H) 82.6 - 102.9 fL    MCH 32.4 25.2 - 33.5 pg    MCHC 31.0 28.4 - 34.8 g/dL    RDW 12.0 11.8 - 14.4 %    Platelets 617 133 - 672 k/uL    MPV 10.0 8.1 - 13.5 fL    NRBC Automated 0.0 0.0 per 100 WBC    Differential Type NOT REPORTED     Seg Neutrophils 43 36 - 65 %    Lymphocytes 41 24 - 43 %    Monocytes 11 3 - 12 %    Eosinophils % 4 1 - 4 %    Basophils 1 0 - 2 %    Immature Granulocytes 0 0 %    Segs Absolute 2.02 1.50 - 8.10 k/uL    Absolute Lymph # 1.95 1.10 - 3.70 k/uL    Absolute Mono # 0.54 0.10 - 1.20 k/uL    Absolute Eos # 0.21 0.00 - 0.44 k/uL    Basophils Absolute 0.05 0.00 - 0.20 k/uL    Absolute Immature Granulocyte <0.03 0.00 - 0.30 k/uL    WBC Morphology NOT REPORTED     RBC Morphology MACROCYTOSIS PRESENT     Platelet Estimate NOT REPORTED    Basic Metabolic Panel w/ Reflex to MG   Result Value Ref Range    Glucose 84 70 - 99 mg/dL    BUN 27 (H) 6 - 20 mg/dL    CREATININE 1.00 (H) 0.50 - 0.90 mg/dL    Bun/Cre Ratio NOT REPORTED 9 - 20    Calcium 9.4 8.6 - 10.4 mg/dL    Sodium 137 135 - 144 mmol/L    Potassium 4.8 3.7 - 5.3 mmol/L    Chloride 104 98 - 107 mmol/L    CO2 21 20 - 31 mmol/L    Anion Gap 12 9 - 17 mmol/L    GFR Non-African American 58 (L) >60 mL/min    GFR African American >60 >60 mL/min    GFR Comment          GFR Staging NOT REPORTED    Troponin   Result Value Ref Range    Troponin, High Sensitivity 13 0 - 14 ng/L    Troponin T NOT REPORTED <0.03 ng/mL    Troponin Interp NOT REPORTED    Brain Natriuretic Peptide   Result Value Ref Range    Pro-BNP 48 <300 pg/mL    BNP Interpretation Pro-BNP Reference Range:    Troponin   Result Value Ref Range    Troponin, High Sensitivity 11 0 - 14 ng/L    Troponin T NOT REPORTED <0.03 ng/mL    Troponin Interp NOT REPORTED        RADIOLOGY:  XR CHEST PORTABLE   Final Result   Minimal perihilar atelectasis. VL DUP LOWER EXTREMITY VENOUS BILATERAL    (Results Pending)       ECG:  Normal sinus rhythm, no axis deviation, no low voltage, no hypertrophy, no T wave changes, no ST elevation or depression, no conducted abnormalities    All EKG's are interpreted by the Emergency Department Physician who either signsor Co-signs this chart in the absence of a cardiologist.  CONSULTS:  IP CONSULT TO CARDIOLOGY    CRITICAL CARE:  See attending physician note    FINAL IMPRESSION      1.  Chest pain due to myocardial ischemia, unspecified ischemic chest pain type          DISPOSITION / PLAN     DISPOSITION Admitted 10/29/2020 06:38:11 PM      PATIENT REFERRED TO:  No follow-up provider specified.     DISCHARGE MEDICATIONS:  New Prescriptions    No medications on file     Modified Medications    No medications on file        Farhad Pina MD  Emergency Medicine Resident    (Please note that portions of this note were completed with a voice recognition program.  Efforts were made to edit the dictations but occasionally words are mis-transcribed.)        Farhad Pina MD  Resident  10/29/20 0301

## 2020-10-29 NOTE — ED NOTES
Bed: 34  Expected date:   Expected time:   Means of arrival:   Comments:  Swathi Lopez 144, RN  10/29/20 9631

## 2020-10-29 NOTE — ED PROVIDER NOTES
[facility]  FACULTY HANDOFF       Handoff taken on the following patient from prior Attending Physician:  Pt Name: Madhuri Kraft  PCP:  Natasha Glover MD    Attestation  I was available and discussed any additional care issues that arose and coordinated the management plans with the resident(s) caring for the patient during my duty period. Any areas of disagreement with resident's documentation of care or procedures are noted on the chart. I was personally present for the key portions of any/all procedures during my duty period. I have documented in the chart those procedures where I was not present during the key portions. CHIEF COMPLAINT       Chief Complaint   Patient presents with    Chest Pain         CURRENT MEDICATIONS     Previous Medications  Current Discharge Medication List      CONTINUE these medications which have NOT CHANGED    Details   benzonatate (TESSALON) 200 MG capsule       celecoxib (CELEBREX) 200 MG capsule       FREESTYLE LITE strip       FreeStyle Lancets MISC       omeprazole (PRILOSEC) 20 MG delayed release capsule       gabapentin (NEURONTIN) 300 MG capsule Take 3 capsules by mouth 3 times daily for 30 days.   Qty: 90 capsule, Refills: 0    Associated Diagnoses: Type 2 diabetes mellitus with other kidney complication, unspecified whether long term insulin use (HCC)      cetirizine (ZYRTEC) 10 MG tablet Take 1 tablet by mouth daily  Qty: 30 tablet, Refills: 0      albuterol sulfate  (90 Base) MCG/ACT inhaler Inhale 2 puffs into the lungs every 4 hours as needed for Wheezing  Qty: 1 Inhaler, Refills: 5    Associated Diagnoses: Mild intermittent asthma with acute exacerbation      FLOVENT  MCG/ACT inhaler Inhale 2 puffs into the lungs 2 times daily  Qty: 1 Inhaler, Refills: 3    Associated Diagnoses: Mild intermittent asthma with acute exacerbation      glipiZIDE (GLUCOTROL) 10 MG tablet Take 1 tablet by mouth 2 times daily (before meals)  Qty: 60 tablet, Refills: 3    Associated Diagnoses: Type 2 diabetes mellitus with other kidney complication, unspecified whether long term insulin use ([facility])      SITagliptin (JANUVIA) 100 MG tablet Take 1 tablet by mouth daily  Qty: 30 tablet, Refills: 3    Associated Diagnoses: Type 2 diabetes mellitus with other kidney complication, unspecified whether long term insulin use ([facility])      metFORMIN (GLUCOPHAGE) 1000 MG tablet Take 1 tablet by mouth daily (with breakfast)  Qty: 60 tablet, Refills: 0    Associated Diagnoses: Type 2 diabetes mellitus with other kidney complication, unspecified whether long term insulin use ([facility])      acetaminophen (TYLENOL) 325 MG tablet Take 2 tablets by mouth every 6 hours as needed for Pain  Qty: 30 tablet, Refills: 0      budesonide-formoterol (SYMBICORT) 80-4.5 MCG/ACT AERO Inhale 2 puffs into the lungs 2 times daily  Qty: 1 Inhaler, Refills: 5    Associated Diagnoses: Mild intermittent asthma with acute exacerbation      ARIPiprazole (ABILIFY) 10 MG tablet Take 1 tablet by mouth daily  Qty: 30 tablet, Refills: 0      melatonin (RA MELATONIN) 3 MG TABS tablet Take 1 tablet by mouth daily  Qty: 30 tablet, Refills: 3    Associated Diagnoses: Primary insomnia      citalopram (CELEXA) 40 MG tablet Take 1 tablet by mouth daily  Qty: 30 tablet, Refills: 0      traZODone (DESYREL) 150 MG tablet Take 1 tablet by mouth nightly  Qty: 14 tablet, Refills: 1             Encounter Medications  Orders Placed This Encounter   Medications    aspirin chewable tablet 324 mg    nitroGLYCERIN (NITROSTAT) SL tablet 0.4 mg    acetaminophen (TYLENOL) tablet 1,000 mg    naproxen (NAPROSYN) tablet 500 mg    lidocaine 4 % external patch 1 patch    sodium chloride flush 0.9 % injection 10 mL    sodium chloride flush 0.9 % injection 10 mL    OR Linked Order Group     acetaminophen (TYLENOL) tablet 650 mg     acetaminophen (TYLENOL) suppository 650 mg    OR Linked Order Group     promethazine (PHENERGAN) tablet 12.5 mg     ondansetron (ZOFRAN) injection 4 mg    enoxaparin (LOVENOX) injection 30 mg    polyethylene glycol (GLYCOLAX) packet 17 g    senna (SENOKOT) tablet 8.6 mg    acetaminophen (TYLENOL) tablet 650 mg    albuterol (PROVENTIL) nebulizer solution 2.5 mg    ARIPiprazole (ABILIFY) tablet 10 mg    benzonatate (TESSALON) capsule 100 mg    budesonide-formoterol (SYMBICORT) 80-4.5 MCG/ACT inhaler 2 puff    celecoxib (CELEBREX) capsule 200 mg    cetirizine (ZYRTEC) tablet 10 mg    citalopram (CELEXA) tablet 40 mg    fluticasone (FLOVENT HFA) 110 MCG/ACT inhaler 2 puff    gabapentin (NEURONTIN) capsule 900 mg    glipiZIDE (GLUCOTROL) tablet 10 mg    melatonin tablet 3 mg    metFORMIN (GLUCOPHAGE) tablet 1,000 mg    pantoprazole (PROTONIX) tablet 40 mg    alogliptin (NESINA) tablet 6.25 mg    traZODone (DESYREL) tablet 150 mg       ALLERGIES     is allergic to bactrim [sulfamethoxazole-trimethoprim] and adhesive tape. RECENT VITALS:    ,  Pulse: 77, Resp: 17, BP: (!) 121/92    RADIOLOGY:   XR CHEST PORTABLE   Final Result   Minimal perihilar atelectasis.          VL DUP LOWER EXTREMITY VENOUS BILATERAL    (Results Pending)       LABS:  Labs Reviewed   CBC WITH AUTO DIFFERENTIAL - Abnormal; Notable for the following components:       Result Value    RBC 3.27 (*)     Hemoglobin 10.6 (*)     Hematocrit 34.2 (*)     .6 (*)     All other components within normal limits   BASIC METABOLIC PANEL W/ REFLEX TO MG FOR LOW K - Abnormal; Notable for the following components:    BUN 27 (*)     CREATININE 1.00 (*)     GFR Non- 58 (*)     All other components within normal limits   TROPONIN   BRAIN NATRIURETIC PEPTIDE   TROPONIN   COMPREHENSIVE METABOLIC PANEL W/ REFLEX TO MG FOR LOW K   TROPONIN     Typical chest pain associated with multiple risk factors, elevated heart score, anticipate admission, troponin EKG nondiagnostic      PLAN/ TASKS OUTSTANDING       Repeat

## 2020-10-30 ENCOUNTER — APPOINTMENT (OUTPATIENT)
Dept: NUCLEAR MEDICINE | Age: 55
End: 2020-10-30
Payer: COMMERCIAL

## 2020-10-30 VITALS
BODY MASS INDEX: 40.18 KG/M2 | HEART RATE: 74 BPM | OXYGEN SATURATION: 95 % | WEIGHT: 250 LBS | HEIGHT: 66 IN | RESPIRATION RATE: 18 BRPM | SYSTOLIC BLOOD PRESSURE: 121 MMHG | DIASTOLIC BLOOD PRESSURE: 86 MMHG | TEMPERATURE: 98.4 F

## 2020-10-30 LAB
ALBUMIN SERPL-MCNC: 3.8 G/DL (ref 3.5–5.2)
ALBUMIN/GLOBULIN RATIO: 1.3 (ref 1–2.5)
ALP BLD-CCNC: 72 U/L (ref 35–104)
ALT SERPL-CCNC: 13 U/L (ref 5–33)
ANION GAP SERPL CALCULATED.3IONS-SCNC: 10 MMOL/L (ref 9–17)
AST SERPL-CCNC: 15 U/L
BILIRUB SERPL-MCNC: <0.1 MG/DL (ref 0.3–1.2)
BUN BLDV-MCNC: 22 MG/DL (ref 6–20)
BUN/CREAT BLD: ABNORMAL (ref 9–20)
CALCIUM SERPL-MCNC: 9 MG/DL (ref 8.6–10.4)
CHLORIDE BLD-SCNC: 105 MMOL/L (ref 98–107)
CO2: 23 MMOL/L (ref 20–31)
CREAT SERPL-MCNC: 0.92 MG/DL (ref 0.5–0.9)
EKG ATRIAL RATE: 73 BPM
EKG ATRIAL RATE: 77 BPM
EKG P AXIS: 62 DEGREES
EKG P AXIS: 66 DEGREES
EKG P-R INTERVAL: 162 MS
EKG P-R INTERVAL: 166 MS
EKG Q-T INTERVAL: 412 MS
EKG Q-T INTERVAL: 422 MS
EKG QRS DURATION: 82 MS
EKG QRS DURATION: 86 MS
EKG QTC CALCULATION (BAZETT): 453 MS
EKG QTC CALCULATION (BAZETT): 477 MS
EKG R AXIS: 21 DEGREES
EKG R AXIS: 7 DEGREES
EKG T AXIS: 59 DEGREES
EKG T AXIS: 62 DEGREES
EKG VENTRICULAR RATE: 73 BPM
EKG VENTRICULAR RATE: 77 BPM
GFR AFRICAN AMERICAN: >60 ML/MIN
GFR NON-AFRICAN AMERICAN: >60 ML/MIN
GFR SERPL CREATININE-BSD FRML MDRD: ABNORMAL ML/MIN/{1.73_M2}
GFR SERPL CREATININE-BSD FRML MDRD: ABNORMAL ML/MIN/{1.73_M2}
GLUCOSE BLD-MCNC: 108 MG/DL (ref 70–99)
GLUCOSE BLD-MCNC: 59 MG/DL (ref 65–105)
GLUCOSE BLD-MCNC: 85 MG/DL (ref 65–105)
GLUCOSE BLD-MCNC: 93 MG/DL (ref 65–105)
HCG QUALITATIVE: NEGATIVE
LV EF: 57 %
LVEF MODALITY: NORMAL
POTASSIUM SERPL-SCNC: 4.6 MMOL/L (ref 3.7–5.3)
SODIUM BLD-SCNC: 138 MMOL/L (ref 135–144)
TOTAL PROTEIN: 6.7 G/DL (ref 6.4–8.3)
TROPONIN INTERP: NORMAL
TROPONIN T: NORMAL NG/ML
TROPONIN, HIGH SENSITIVITY: 13 NG/L (ref 0–14)

## 2020-10-30 PROCEDURE — 93017 CV STRESS TEST TRACING ONLY: CPT

## 2020-10-30 PROCEDURE — 6370000000 HC RX 637 (ALT 250 FOR IP): Performed by: EMERGENCY MEDICINE

## 2020-10-30 PROCEDURE — 78452 HT MUSCLE IMAGE SPECT MULT: CPT

## 2020-10-30 PROCEDURE — 6370000000 HC RX 637 (ALT 250 FOR IP): Performed by: STUDENT IN AN ORGANIZED HEALTH CARE EDUCATION/TRAINING PROGRAM

## 2020-10-30 PROCEDURE — A9500 TC99M SESTAMIBI: HCPCS | Performed by: INTERNAL MEDICINE

## 2020-10-30 PROCEDURE — 2580000003 HC RX 258: Performed by: STUDENT IN AN ORGANIZED HEALTH CARE EDUCATION/TRAINING PROGRAM

## 2020-10-30 PROCEDURE — G0378 HOSPITAL OBSERVATION PER HR: HCPCS

## 2020-10-30 PROCEDURE — 36415 COLL VENOUS BLD VENIPUNCTURE: CPT

## 2020-10-30 PROCEDURE — 82947 ASSAY GLUCOSE BLOOD QUANT: CPT

## 2020-10-30 PROCEDURE — 3430000000 HC RX DIAGNOSTIC RADIOPHARMACEUTICAL: Performed by: INTERNAL MEDICINE

## 2020-10-30 PROCEDURE — 2580000003 HC RX 258: Performed by: INTERNAL MEDICINE

## 2020-10-30 PROCEDURE — 93005 ELECTROCARDIOGRAM TRACING: CPT | Performed by: STUDENT IN AN ORGANIZED HEALTH CARE EDUCATION/TRAINING PROGRAM

## 2020-10-30 PROCEDURE — 84484 ASSAY OF TROPONIN QUANT: CPT

## 2020-10-30 PROCEDURE — 84703 CHORIONIC GONADOTROPIN ASSAY: CPT

## 2020-10-30 PROCEDURE — 80053 COMPREHEN METABOLIC PANEL: CPT

## 2020-10-30 PROCEDURE — 6360000002 HC RX W HCPCS: Performed by: INTERNAL MEDICINE

## 2020-10-30 RX ORDER — SODIUM CHLORIDE 0.9 % (FLUSH) 0.9 %
10 SYRINGE (ML) INJECTION PRN
Status: DISCONTINUED | OUTPATIENT
Start: 2020-10-30 | End: 2020-10-30 | Stop reason: ALTCHOICE

## 2020-10-30 RX ORDER — AMINOPHYLLINE DIHYDRATE 25 MG/ML
50 INJECTION, SOLUTION INTRAVENOUS PRN
Status: DISCONTINUED | OUTPATIENT
Start: 2020-10-30 | End: 2020-10-30 | Stop reason: ALTCHOICE

## 2020-10-30 RX ORDER — SODIUM CHLORIDE 0.9 % (FLUSH) 0.9 %
10 SYRINGE (ML) INJECTION PRN
Status: DISCONTINUED | OUTPATIENT
Start: 2020-10-30 | End: 2020-10-30 | Stop reason: HOSPADM

## 2020-10-30 RX ORDER — SODIUM CHLORIDE 9 MG/ML
500 INJECTION, SOLUTION INTRAVENOUS CONTINUOUS PRN
Status: DISCONTINUED | OUTPATIENT
Start: 2020-10-30 | End: 2020-10-30 | Stop reason: ALTCHOICE

## 2020-10-30 RX ORDER — NITROGLYCERIN 0.4 MG/1
0.4 TABLET SUBLINGUAL EVERY 5 MIN PRN
Status: DISCONTINUED | OUTPATIENT
Start: 2020-10-30 | End: 2020-10-30 | Stop reason: ALTCHOICE

## 2020-10-30 RX ORDER — METOPROLOL TARTRATE 5 MG/5ML
5 INJECTION INTRAVENOUS EVERY 5 MIN PRN
Status: DISCONTINUED | OUTPATIENT
Start: 2020-10-30 | End: 2020-10-30 | Stop reason: ALTCHOICE

## 2020-10-30 RX ORDER — ATROPINE SULFATE 0.1 MG/ML
0.5 INJECTION INTRAVENOUS EVERY 5 MIN PRN
Status: DISCONTINUED | OUTPATIENT
Start: 2020-10-30 | End: 2020-10-30 | Stop reason: ALTCHOICE

## 2020-10-30 RX ADMIN — REGADENOSON 0.4 MG: 0.08 INJECTION, SOLUTION INTRAVENOUS at 11:24

## 2020-10-30 RX ADMIN — Medication 10 ML: at 10:46

## 2020-10-30 RX ADMIN — GABAPENTIN 900 MG: 300 CAPSULE ORAL at 14:01

## 2020-10-30 RX ADMIN — TETRAKIS(2-METHOXYISOBUTYLISOCYANIDE)COPPER(I) TETRAFLUOROBORATE 14.3 MILLICURIE: 1 INJECTION, POWDER, LYOPHILIZED, FOR SOLUTION INTRAVENOUS at 11:25

## 2020-10-30 RX ADMIN — CETIRIZINE HYDROCHLORIDE 10 MG: 10 TABLET ORAL at 14:02

## 2020-10-30 RX ADMIN — SODIUM CHLORIDE, PRESERVATIVE FREE 10 ML: 5 INJECTION INTRAVENOUS at 14:04

## 2020-10-30 RX ADMIN — CITALOPRAM 40 MG: 20 TABLET, FILM COATED ORAL at 14:01

## 2020-10-30 RX ADMIN — Medication 10 ML: at 11:24

## 2020-10-30 RX ADMIN — ACETAMINOPHEN 650 MG: 325 TABLET ORAL at 14:06

## 2020-10-30 RX ADMIN — CELECOXIB 200 MG: 100 CAPSULE ORAL at 14:02

## 2020-10-30 RX ADMIN — ARIPIPRAZOLE 10 MG: 10 TABLET ORAL at 14:02

## 2020-10-30 RX ADMIN — ALOGLIPTIN 6.25 MG: 6.25 TABLET, FILM COATED ORAL at 14:01

## 2020-10-30 RX ADMIN — SODIUM CHLORIDE, PRESERVATIVE FREE 10 ML: 5 INJECTION INTRAVENOUS at 11:25

## 2020-10-30 RX ADMIN — SODIUM CHLORIDE, PRESERVATIVE FREE 10 ML: 5 INJECTION INTRAVENOUS at 12:47

## 2020-10-30 RX ADMIN — GLIPIZIDE 10 MG: 10 TABLET ORAL at 14:01

## 2020-10-30 RX ADMIN — BUDESONIDE AND FORMOTEROL FUMARATE DIHYDRATE 2 PUFF: 80; 4.5 AEROSOL RESPIRATORY (INHALATION) at 09:50

## 2020-10-30 RX ADMIN — TETRAKIS(2-METHOXYISOBUTYLISOCYANIDE)COPPER(I) TETRAFLUOROBORATE 51 MILLICURIE: 1 INJECTION, POWDER, LYOPHILIZED, FOR SOLUTION INTRAVENOUS at 12:47

## 2020-10-30 ASSESSMENT — PAIN SCALES - GENERAL
PAINLEVEL_OUTOF10: 0
PAINLEVEL_OUTOF10: 0
PAINLEVEL_OUTOF10: 5
PAINLEVEL_OUTOF10: 0

## 2020-10-30 ASSESSMENT — ENCOUNTER SYMPTOMS
SHORTNESS OF BREATH: 1
RHINORRHEA: 0
DIARRHEA: 0
SORE THROAT: 0
ABDOMINAL PAIN: 0
COUGH: 0
EYE PAIN: 0
CONSTIPATION: 0

## 2020-10-30 NOTE — CONSULTS
[facility]    Inpatient Consultation Note               Today's Date: 10/30/2020  Patient Name: Lorena Brand  Date of admission: 10/29/2020  1:43 PM  Patient's age: 50 y.o., 1965  Admission Dx: Chest pain [R07.9]    Reason for  Consult:  Chest pain    Requesting Physician: Juan Antonio Kebede MD    CHIEF COMPLAINT:  Chest pain   Chief Complaint   Patient presents with    Chest Pain       History Obtained From:  patient    HISTORY OF PRESENT ILLNESS:      The patient is a 50 y.o. female with PMH of HTN, DM Type II, chronic renal insufficiency and history of polysubstance abuse who presented to the ED with chest pain. Pain was relieved in ED with nitroglycerin for a few minutes and then patient continued to have pain. Pain described as sharp and throbbing on the left anterior chest with radiation to left arm. Patient admitted for possible MI and cardiology consulted. Patient EKG is normal. Troponin level is normal x 3. ECHO done on 4/11/2018 showed EF 45% with Grade I diastolic dysfunction. Cardiac cath done on 6/19/2017 showed normal arteries with no significant CAD. Heart score is 4.      Past Medical History:   has a past medical history of Acid reflux, Acute cystitis with hematuria, Acute non-recurrent maxillary sinusitis, Asthma, Bipolar 1 disorder ([address] Bipolar disorder, mixed ([address] BMI 34.0-34.9,adult, Cannabis use disorder, severe, dependence ([address] Cerebrovascular accident (CVA) ([address] Chest pain, Chronic renal insufficiency, Cocaine abuse ([address] DDD (degenerative disc disease), cervical, Diabetes mellitus ([address] Dizziness, Fibromyalgia, History of stroke, Homicidal ideation, Hyperglycemia, Hypertension, Hypotension, IDDM (insulin dependent diabetes mellitus), Lupus ([address] Migraine, Neuropathy, Neuropathy, Polysubstance abuse ([address] Post traumatic stress disorder (PTSD), Posttraumatic stress disorder, Recurrent depression ([address] Recurrent major depressive disorder, in partial tablet by mouth daily 10/20/20  Yes Sky Pop MD   citalopram (CELEXA) 40 MG tablet Take 1 tablet by mouth daily 9/12/17  Yes Steven Brambila MD   traZODone (DESYREL) 150 MG tablet Take 1 tablet by mouth nightly 9/12/17  Yes Steven Brambila MD   benzonatate (TESSALON) 200 MG capsule  8/17/20   Historical Provider, MD   FREESTYLE LITE strip  9/25/20   Historical Provider, MD   FreeStyle Lancets [facility]  9/25/20   Historical Provider, MD   acetaminophen (TYLENOL) 325 MG tablet Take 2 tablets by mouth every 6 hours as needed for Pain 10/20/20   Sky Pop MD        Current Facility-Administered Medications: nitroGLYCERIN (NITROSTAT) SL tablet 0.4 mg, 0.4 mg, Sublingual, Q5 Min PRN  lidocaine 4 % external patch 1 patch, 1 patch, Transdermal, Daily  sodium chloride flush 0.9 % injection 10 mL, 10 mL, Intravenous, 2 times per day  sodium chloride flush 0.9 % injection 10 mL, 10 mL, Intravenous, PRN  acetaminophen (TYLENOL) tablet 650 mg, 650 mg, Oral, Q6H PRN **OR** acetaminophen (TYLENOL) suppository 650 mg, 650 mg, Rectal, Q6H PRN  promethazine (PHENERGAN) tablet 12.5 mg, 12.5 mg, Oral, Q6H PRN **OR** ondansetron (ZOFRAN) injection 4 mg, 4 mg, Intravenous, Q6H PRN  enoxaparin (LOVENOX) injection 30 mg, 30 mg, Subcutaneous, BID  polyethylene glycol (GLYCOLAX) packet 17 g, 17 g, Oral, Daily  senna (SENOKOT) tablet 8.6 mg, 1 tablet, Oral, Daily PRN  acetaminophen (TYLENOL) tablet 650 mg, 650 mg, Oral, Q6H PRN  albuterol (PROVENTIL) nebulizer solution 2.5 mg, 2.5 mg, Nebulization, Q6H PRN  ARIPiprazole (ABILIFY) tablet 10 mg, 10 mg, Oral, Daily  benzonatate (TESSALON) capsule 100 mg, 100 mg, Oral, Q4H PRN  budesonide-formoterol (SYMBICORT) 80-4.5 MCG/ACT inhaler 2 puff, 2 puff, Inhalation, BID  celecoxib (CELEBREX) capsule 200 mg, 200 mg, Oral, BID  cetirizine (ZYRTEC) tablet 10 mg, 10 mg, Oral, Daily  citalopram (CELEXA) tablet 40 mg, 40 mg, Oral, Daily  fluticasone (FLOVENT HFA) 110 MCG/ACT inhaler 2 puff, 2 puff, Inhalation, BID  gabapentin (NEURONTIN) capsule 900 mg, 900 mg, Oral, TID  melatonin tablet 3 mg, 3 mg, Oral, Daily  metFORMIN (GLUCOPHAGE) tablet 1,000 mg, 1,000 mg, Oral, Daily with breakfast  pantoprazole (PROTONIX) tablet 40 mg, 40 mg, Oral, BID AC  alogliptin (NESINA) tablet 6.25 mg, 6.25 mg, Oral, Daily  traZODone (DESYREL) tablet 150 mg, 150 mg, Oral, Nightly  glipiZIDE (GLUCOTROL) tablet 10 mg, 10 mg, Oral, BID AC  glucose (GLUTOSE) 40 % oral gel 15 g, 15 g, Oral, PRN  dextrose 50 % IV solution, 12.5 g, Intravenous, PRN  glucagon (rDNA) injection 1 mg, 1 mg, Intramuscular, PRN  dextrose 5 % solution, 100 mL/hr, Intravenous, PRN    Allergies:  Bactrim [sulfamethoxazole-trimethoprim] and Adhesive tape    Social History:   reports that she has never smoked. She has never used smokeless tobacco. She reports previous alcohol use. She reports previous drug use. Drugs: Marijuana and Cocaine. Family History: family history includes Diabetes in her brother, father, mother, and sister; No Known Problems in her sister; Other in her son; Stroke in her mother. REVIEW OF SYSTEMS:      · Constitutional: there has been no unanticipated weight loss. · Eyes: No visual changes or diplopia. · ENT: No Headaches  · Cardiovascular:  Remaining as above  · Respiratory: No cough  · Gastrointestinal: No abdominal pain. No change in bowel or bladder habits. · Genitourinary: No dysuria, trouble voiding, or hematuria. · Musculoskeletal: No joint complaints.   · Neurological: No headache  · Hematologic/Lymphatic: No abnormal bruising or bleeding      PHYSICAL EXAM:      BP (!) 166/96   Pulse 77   Temp 97.3 °F (36.3 °C) (Oral)   Resp 16   Ht 5' 6\" (1.676 m)   Wt 250 lb (113.4 kg)   LMP  (LMP Unknown)   SpO2 98%   BMI 40.35 kg/m²    No intake or output data in the 24 hours ending 10/30/20 0733      Constitutional and General Appearance:    Alert, cooperative, no distress and appears stated age  Respiratory:  · No for increased work of breathing. · On auscultation: clear to auscultation bilaterally  Cardiovascular:  · Regular S1 and S2.   · No murmurs  Abdomen:   · No masses or tenderness  · Bowel sounds present  Extremities:  ·  No Cyanosis or Clubbing  ·  Lower extremity edema: no  Neurological:  · Alert and oriented. · Moves all extremities well    DATA:    Diagnostics:    EKG:   Normal sinus rhythm  Normal ECG  When compared with ECG of 29-OCT-2020 13:48,  No significant change was found  ECHO:    10/20/2018:  EF 55%, mild inferoseptal basilar hypokinesis. Trace MR/TR. Stress Test:   4/11/2018: Lexiscan protocol completed. No significant ECG changes. No significant arrhythmia. 6/16/17: Intermediate risk for ischemia with fixed anterior and small apical reversible defect. EF 59%. Cardiac Angiography:   6/19/2017: Normal coronary arteries with no significant coronary artery disease     Labs:     CBC:   Recent Labs     10/29/20  1433   WBC 4.8   HGB 10.6*   HCT 34.2*        BMP:   Recent Labs     10/29/20  1433 10/30/20  0657    PENDING   K 4.8 PENDING   CO2 21 PENDING   BUN 27* PENDING   CREATININE 1.00* PENDING   LABGLOM 58* PENDING   GLUCOSE 84 PENDING     Pro-BNP:    Recent Labs     10/29/20  1433   PROBNP 48     BNP: No results for input(s): BNP in the last 72 hours. PT/INR: No results for input(s): PROTIME, INR in the last 72 hours. APTT:No results for input(s): APTT in the last 72 hours. CARDIAC ENZYMES:No results for input(s): CKTOTAL, CKMB, CKMBINDEX, TROPONINI in the last 72 hours.     Invalid input(s):  [address]  Recent Labs     10/29/20  1433 10/29/20  1725 10/30/20  0657   TROPONINT NOT REPORTED NOT REPORTED NOT REPORTED       FASTING LIPID PANEL:  Lab Results   Component Value Date    HDL 72 12/18/2019    TRIG 90 12/18/2019     LIVER PROFILE:  Recent Labs     10/30/20  0657   AST PENDING   ALT PENDING   LABALBU PENDING         Patient's Active Problem List  Active Problems:    Chest pain  Resolved Problems:    * No resolved hospital problems. *        IMPRESSION:    1. Typical chest pain, partially relieved with nitroglycerin, no EKG abnormalities, negative troponin level elevation  x3  2. Polysubstance abuse-no UDS this admission  3. Type II Diabetes Mellitus-uncontrolled  4. Hypertension       RECOMMENDATIONS:  1. Will do nuclear stress test given clinical presentation  2. Will order urine drug screen given past history of cocaine use  3. Continue management as per primary team      Thank you for allowing us to participate in the care of [address] [place] [address]. If you have any questions or concerns, please do not hesitate to contact us. Discussed with patient and Nurse. Sotero Grider, Medical Student Fourth Year  Student, Cardiovascular Diseases    [facility]      Physical Exam and Plan of Care conducted and discussed with supervision from Dr. Garry Garcia. Please note that part of this chart were generated using voice recognition  dictation software. Although every effort was made to ensure the accuracy of this automated transcription, some errors in transcription may have occurred. Attestation signed by      Attending Physician Statement:    I have discussed the care of  [address] [place] [address] , including pertinent history and exam findings, with the Cardiology fellow/resident. I have seen and examined the patient and the key elements of all parts of the encounter have been performed by me. I agree with the assessment, plan and orders as documented by the fellow/resident/student, after I modified HPI/ROS/PE/Labs/Cardiac Data/A&P and the final version is my approved version of the assessment.      Additional Comments:   Chest pain, mixed features, relieved with some Nitro, concern for angina  DM2  HTN  - will check Lexiscan stress test to rule out ischemia/further risk stratify   - If stress test is negative or low risk plan for d/c home today from cardiac standpoint and follow up with cardiology in 2 weeks. Discussed with patient and nursing. Thank you for allowing me to participate in the care of this patient, please do not hesitate to call if you have any questions. Bryant Blas DO, [facility], [address], [address] [facility]  [email or web address]  [phone]

## 2020-10-30 NOTE — PROGRESS NOTES
[place] [facility]  Documentation Note                Admission Dx: Chest pain [R07.9]    Past Medical History:   has a past medical history of Acid reflux, Acute cystitis with hematuria, Acute non-recurrent maxillary sinusitis, Asthma, Bipolar 1 disorder ([address] Bipolar disorder, mixed ([address] BMI 34.0-34.9,adult, Cannabis use disorder, severe, dependence ([address] Cerebrovascular accident (CVA) ([address] Chest pain, Chronic renal insufficiency, Cocaine abuse ([address] DDD (degenerative disc disease), cervical, Diabetes mellitus ([address] Dizziness, Fibromyalgia, History of stroke, Homicidal ideation, Hyperglycemia, Hypertension, Hypotension, IDDM (insulin dependent diabetes mellitus), Lupus ([address] Migraine, Neuropathy, Neuropathy, Polysubstance abuse ([address] Post traumatic stress disorder (PTSD), Posttraumatic stress disorder, Recurrent depression ([address] Recurrent major depressive disorder, in partial remission ([address] Screening mammogram, encounter for, Severe recurrent major depression with psychotic features ([address] Severe recurrent major depression without psychotic features ([address] Stroke (cerebrum) ([address] Stroke ([address] Suicidal ideation, Suicidal intent, Vitamin D deficiency, and White matter changes. Previous Testing:     ECHO 10/20/18: EF 55%, mild inferoseptal basilar hypokinesis. Trace MR/TR. CATH 6/19/17: Normal coronaries. STRESS 6/16/17: Intermediate risk for ischemia with fixed anterior and small apical reversible defect. EF 59%. Previous office/hospital visit:   [facility] 2/22/19:   1. Chest pain - non cardiac     - not ACS     2. Cardiac cath 2017 with normal coronary arteries     3.  DM II with poor control    Reginaldo Yi [facility]

## 2020-10-30 NOTE — H&P
[employer]  CDU / OBSERVATION ENCOUNTER  RESIDENT NOTE     Pt Name: Marco A Reyes  MRN: [id number]  [place] 1965  Date of evaluation: 10/30/20  Patient's PCP is :  Boyd Connor MD    CHIEF COMPLAINT       Chief Complaint   Patient presents with    Chest Pain         HISTORY OF PRESENT ILLNESS    Marco A Reyes is a 50 y.o. female last medical history significant for lupus who presents with acute chest pain including shortness of breath. Patient states that she was not doing anything in particular when she had acute onset of chest pain. She describes this pain as located in the left anterior front chest that radiated to the left shoulder. She said that it came and went intermittently, with no association. Described as a sharp pain. Moderate to severe in intensity. Patient's states that she did have some shortness of breath and lightheadedness associated with the chest pain. Denies nausea, vomiting, diaphoresis, radiation to bilateral arms or bilateral jaw. Did not attempt any medication at home    Location/Symptom: Chest Pain of left anterior chest  Timing/Onset: acute onset  Provocation: No association  Quality: Sharp  Radiation: Left shoulder  Severity: Moderate to severe  Timing/Duration: Intermittent  Modifying Factors: None    REVIEW OF SYSTEMS       Review of Systems   Constitutional: Negative for activity change, chills and fever. HENT: Negative for congestion, rhinorrhea and sore throat. Eyes: Negative for pain and visual disturbance. Respiratory: Positive for shortness of breath. Negative for cough. Cardiovascular: Positive for chest pain. Negative for palpitations. Gastrointestinal: Negative for abdominal pain, constipation and diarrhea. Genitourinary: Negative for difficulty urinating and frequency. Musculoskeletal: Negative for arthralgias and myalgias. Skin: Negative for rash and wound. Neurological: Negative for dizziness and headaches. (PQRS) Advance directives on face sheet per hospital policy. No change unless specifically mentioned in chart    PAST MEDICAL HISTORY    has a past medical history of Acid reflux, Acute cystitis with hematuria, Acute non-recurrent maxillary sinusitis, Asthma, Bipolar 1 disorder ([address] Bipolar disorder, mixed ([address] BMI 34.0-34.9,adult, Cannabis use disorder, severe, dependence ([address] Cerebrovascular accident (CVA) ([address] Chest pain, Chronic renal insufficiency, Cocaine abuse ([address] DDD (degenerative disc disease), cervical, Diabetes mellitus ([address] Dizziness, Fibromyalgia, History of stroke, Homicidal ideation, Hyperglycemia, Hypertension, Hypotension, IDDM (insulin dependent diabetes mellitus), Lupus ([address] Migraine, Neuropathy, Neuropathy, Polysubstance abuse ([address] Post traumatic stress disorder (PTSD), Posttraumatic stress disorder, Recurrent depression ([address] Recurrent major depressive disorder, in partial remission ([address] Screening mammogram, encounter for, Severe recurrent major depression with psychotic features ([address] Severe recurrent major depression without psychotic features ([address] Stroke (cerebrum) ([address] Stroke ([address] Suicidal ideation, Suicidal intent, Vitamin D deficiency, and White matter changes. I have reviewed the past medical history with the patient and it is pertinent to this complaint. SURGICAL HISTORY      has a past surgical history that includes Abscess Drainage; chest tube insertion; Abdomen surgery; Cataract removal with implant (Bilateral); and LASIK (Bilateral). I have reviewed and agree with Surgical History entered and it is pertinent to this complaint.      CURRENT MEDICATIONS     nitroGLYCERIN (NITROSTAT) SL tablet 0.4 mg, Q5 Min PRN  lidocaine 4 % external patch 1 patch, Daily  sodium chloride flush 0.9 % injection 10 mL, 2 times per day  sodium chloride flush 0.9 % injection 10 mL, PRN  acetaminophen (TYLENOL) tablet 650 mg, Q6H PRN    Or  acetaminophen (TYLENOL) suppository 650 mg, Q6H PRN  promethazine (PHENERGAN) tablet 12.5 mg, Q6H PRN    Or  ondansetron (ZOFRAN) injection 4 mg, Q6H PRN  enoxaparin (LOVENOX) injection 30 mg, BID  polyethylene glycol (GLYCOLAX) packet 17 g, Daily  senna (SENOKOT) tablet 8.6 mg, Daily PRN  acetaminophen (TYLENOL) tablet 650 mg, Q6H PRN  albuterol (PROVENTIL) nebulizer solution 2.5 mg, Q6H PRN  ARIPiprazole (ABILIFY) tablet 10 mg, Daily  benzonatate (TESSALON) capsule 100 mg, Q4H PRN  budesonide-formoterol (SYMBICORT) 80-4.5 MCG/ACT inhaler 2 puff, BID  celecoxib (CELEBREX) capsule 200 mg, BID  cetirizine (ZYRTEC) tablet 10 mg, Daily  citalopram (CELEXA) tablet 40 mg, Daily  fluticasone (FLOVENT HFA) 110 MCG/ACT inhaler 2 puff, BID  gabapentin (NEURONTIN) capsule 900 mg, TID  melatonin tablet 3 mg, Daily  metFORMIN (GLUCOPHAGE) tablet 1,000 mg, Daily with breakfast  pantoprazole (PROTONIX) tablet 40 mg, BID AC  alogliptin (NESINA) tablet 6.25 mg, Daily  traZODone (DESYREL) tablet 150 mg, Nightly  glipiZIDE (GLUCOTROL) tablet 10 mg, BID AC  glucose (GLUTOSE) 40 % oral gel 15 g, PRN  dextrose 50 % IV solution, PRN  glucagon (rDNA) injection 1 mg, PRN  dextrose 5 % solution, PRN        All medication charted and reviewed. ALLERGIES     is allergic to bactrim [sulfamethoxazole-trimethoprim] and adhesive tape. FAMILY HISTORY     She indicated that her mother is . She indicated that her father is . She indicated that only one of her two sisters is alive. She indicated that her brother is alive. She indicated that her son is . family history includes Diabetes in her brother, father, mother, and sister; No Known Problems in her sister; Other in her son; Stroke in her mother. The patient denies any pertinent family history. I have reviewed and agree with the family history entered. I have reviewed the Family History and it is not significant to the case    SOCIAL HISTORY      reports that she has never smoked. She has never used smokeless tobacco. She reports previous alcohol use. She reports previous drug use. Drugs: Marijuana and Cocaine. I have reviewed and agree with all Social.  There are no concerns for substance abuse/use. PHYSICAL EXAM     INITIAL VITALS:  height is 5' 6\" (1.676 m) and weight is 250 lb (113.4 kg). Her oral temperature is 98.4 °F (36.9 °C). Her blood pressure is 121/86 and her pulse is 74. Her respiration is 18 and oxygen saturation is 95%. Physical Exam  Vitals signs and nursing note reviewed. Constitutional:       General: She is not in acute distress. Appearance: Normal appearance. She is not ill-appearing. HENT:      Head: Normocephalic and atraumatic. Nose: Nose normal.      Mouth/Throat:      Mouth: Mucous membranes are moist.      Pharynx: Oropharynx is clear. Eyes:      General:         Right eye: No discharge. Left eye: No discharge. Cardiovascular:      Rate and Rhythm: Normal rate and regular rhythm. Heart sounds: No murmur. No friction rub. No gallop. Pulmonary:      Effort: Pulmonary effort is normal. No respiratory distress. Breath sounds: Normal breath sounds. Abdominal:      General: There is no distension. Palpations: Abdomen is soft. Tenderness: There is no abdominal tenderness. Skin:     General: Skin is warm and dry. Neurological:      General: No focal deficit present. Mental Status: She is alert and oriented to person, place, and time. Psychiatric:         Mood and Affect: Mood normal.         Thought Content:  Thought content normal.             DIFFERENTIAL DIAGNOSIS/MDM:     DDx: angina, unstable angina, less likely acs, less likely PE as patient is not tachycardic, tachypenic, or hypoxic     DIAGNOSTIC RESULTS     EKG: All EKG's are interpreted by the Observation Physician who either signs or Co-signs this chart in the absence of a cardiologist.    EKG Interpretation    Interpreted by observation physician    Regular rate, normal rhythm, normal axis, no enlargements or low voltage noted, good R wave progression, no ST elevation or depression, no T waves noted. Polo Adler DO        RADIOLOGY:   I directly visualized the following  images and reviewed the radiologist interpretations:    Xr Chest Portable    Result Date: 10/29/2020  EXAMINATION: ONE XRAY VIEW OF THE CHEST 10/29/2020 1:54 pm COMPARISON: 02/21/2019 HISTORY: ORDERING SYSTEM PROVIDED HISTORY: chest pain TECHNOLOGIST PROVIDED HISTORY: chest pain Reason for Exam: upr Acuity: Unknown Type of Exam: Unknown FINDINGS: Stable heart size with mild central vessel prominence and streaky perihilar atelectasis. No overt CHF. No lobar consolidation is seen. No large pleural effusions. Again shown is a benign and appearing calcific density right lung base. No acute osseous abnormality within limits of the exam. Monitor leads overlie the chest.     Minimal perihilar atelectasis. LABS:  I have reviewed and interpreted all available lab results.   Labs Reviewed   CBC WITH AUTO DIFFERENTIAL - Abnormal; Notable for the following components:       Result Value    RBC 3.27 (*)     Hemoglobin 10.6 (*)     Hematocrit 34.2 (*)     .6 (*)     All other components within normal limits   BASIC METABOLIC PANEL W/ REFLEX TO MG FOR LOW K - Abnormal; Notable for the following components:    BUN 27 (*)     CREATININE 1.00 (*)     GFR Non- 58 (*)     All other components within normal limits   COMPREHENSIVE METABOLIC PANEL W/ REFLEX TO MG FOR LOW K - Abnormal; Notable for the following components:    Glucose 108 (*)     BUN 22 (*)     CREATININE 0.92 (*)     Total Bilirubin <0.10 (*)     All other components within normal limits   POC GLUCOSE FINGERSTICK - Abnormal; Notable for the following components:    POC Glucose 59 (*)     All other components within normal limits   TROPONIN   BRAIN NATRIURETIC PEPTIDE   TROPONIN   TROPONIN POC GLUCOSE FINGERSTICK   POCT GLUCOSE   POCT GLUCOSE   POCT GLUCOSE       SCREENING TOOLS:    HEART Risk Score for Chest Pain Patients   History and Physical Exam Suspicion Level  (Nausea, Vomiting, Diaphoresis, Radiation, Exertion)   Slightly Suspicious (0 pts)   Moderately Suspicious (1 pt)   Highly Suspicious (2 pts)   EKG Interpretation   Normal (0 pts)   Non-Specific Repolarization Disturbance (1 pt)   Significant ST-Depression (2 pts)   Age of Patient (in years)   = 39 (0 pts)   46-64 (1 pt)   = 65 (2 pts)   Risk Factors   No Risk Factors (0 pts)   1-2 Risk Factors (1 pt)   = 3 Risk Factors (2 pts)   Risk Factors Include:   Hypercholesterolemia   Hypertension   Diabetes Mellitus   Cigarette smoking   Positive family history   Obesity   CAD   (SLE, CKDz, HIV, Cocaine abuse)   Troponin Levels   = Normal Limit (0 pts)   1-3 Times Normal Limit (1 pt)   > 3 Times Normal Limit (2 pts)  TOTAL:    Percent Risk for Major Adverse Cardiac Event (MACE)  0-3 pts indicates low risk for MACE   2.5% (DISCHARGE)   4-7 pts indicates moderate risk for MACE  20.3% (OBS)  8-10 pts indicates high risk for MACE  72.7% (EARLY INVASIVE [place])    CDU DARRICK / Queenie Fregoso is a 50 y.o. female who presents with acute onset of chest pain    1. Acute onset of chest pain  · EKG within normal limits  · Troponins negative  · Cardiac work-up thus far has been negative. · Cardiology consulted. Awaiting recommendations, appreciate recommendations. · Continue home medications and pain control  · Monitor vitals, labs, and imaging  · DISPO: pending consults and clinical improvement    CONSULTS:    IP CONSULT TO CARDIOLOGY    PROCEDURES:  Not indicated       PATIENT REFERRED TO:    No follow-up provider specified. --  Xavi Murphy DO   Emergency Medicine Resident     This dictation was generated by voice recognition computer software.   Although all attempts are made to edit the dictation for accuracy, there may be errors in the transcription that are not intended.

## 2020-10-30 NOTE — PROGRESS NOTES
CDU Daily Progress Note  Attending Physician       Pt Name: Lisa Duenas  MRN: [id number]  [place] 1965  Date of evaluation: 10/30/20    I performed a history and physical examination of the patient and discussed management with the resident. I reviewed the residents note and agree with the documented findings and plan of care. Any areas of disagreement are noted on the chart. I was personally present for the key portions of any procedures. I have documented in the chart those procedures where I was not present during the key portions. I have reviewed the emergency nurses triage note. I agree with the chief complaint, past medical history, past surgical history, allergies, medications, social and family history as documented unless otherwise noted below. Documentation of the HPI, Physical Exam and Medical Decision Making performed by medical students or scribes is based on my personal performance of the HPI, PE and MDM. For Physician Assistant/ Nurse Practitioner cases/documentation I have personally evaluated this patient and have completed at least one if not all key elements of the E/M (history, physical exam, and MDM). Additional findings are as noted. The Family History, Social History and Review of Systems are unchanged from the previous day. No significant events overnight. Chest pain started 30 prior to ED visit while working in [employer], [address] [place] [address] given nitro helped the pain. Some SOB w chest pain, denies N/V/diaphoresis/F/C/cough. PMHx: DM, HTN, CVA, substance abuse, CKDz, PTSD, morbid obesity, bipolar. ECG nonspecific, CXR no acute. HSTrops 13, 11. Hb 10.6. Cards consulted.  Lexiscan stress ordered    Obdulia Garces MD  Attending Physician  Critical Decision Unit

## 2020-10-30 NOTE — CARE COORDINATION
Case Management Initial Discharge Plan  Terri Palmer,             Met with:patient to discuss discharge plans. Information verified: address, contacts, phone number, , insurance Yes    Emergency Contact/Next of Kin name & number: Huma Miguel (daughter) [phone]    PCP: Adama Sauceda MD  Date of last visit: 2 days ago    Insurance Provider: [employer]    Discharge Planning    Living Arrangements:  Alone   Support Systems:  Children, Spouse/Significant Other    Home:apartment, no steps    Patient able to perform ADL's:Independent    Current Services (outpatient & in home) none  DME equipment: cane  DME provider:     Receiving oral anticoagulation therapy? No    If indicated:   Physician managing anticoagulation treatment:   Where does patient obtain lab work for ATC treatment? Potential Assistance Needed:       Patient agreeable to home care: No  [place] of choice provided:  n/a    Prior SNF/Rehab Placement and Facility:   Agreeable to SNF/Rehab: No  [place] of choice provided: n/a     Evaluation: no    Expected Discharge date:       Patient expects to be discharged to: Follow Up Appointment: Best Day/ Time:      Transportation provider: friend  Transportation arrangements needed for discharge: No, no states will get a ride home    Readmission Risk              Risk of Unplanned Readmission:        0             Does patient have a readmission risk score greater than 14?: not calculated. If yes, follow-up appointment must be made within 7 days of discharge.      Goals of Care: decreased chest pain      Discharge Plan: return to home          Electronically signed by Everardo Marks RN on 10/30/20 at 11:34 AM EDT

## 2020-10-30 NOTE — PROCEDURES
[facility] ([place], [place]) 30                              CARDIAC STRESS TEST    PATIENT NAME: Jeramy Arriaga                   :        1965  MED REC NO:   [id number]                             ROOM:       0345  ACCOUNT NO:   [de-identified]                           ADMIT DATE: 10/29/2020  PROVIDER:     Wanda Aguilar    DATE OF STUDY:  10/30/2020    ORDERING PROVIDER:  Erik Weeks DO  INTERPRETING PHYSICIAN:  Wanda Aguilar MD    LEXISCAN STRESS STUDY:  Indication:  chest pain    Medications:  Lexiscan, 0.4 mg  Resting heart rate:  73 bpm  Resting blood pressure:  133/78 mm/Hg  Infusion heart rate:  97 bpm  Infusion blood pressure:  146/80 mm/Hg  Resting EKG:  Normal  Stress heart response:  Normal response  Stress BP response:  Appropriate  Stress EKG(S):  No changes seen  Chest discomfort:  None  Ischemic EKG changes:  None    IMPRESSION:  Electrocardiographically negative Lexiscan stress study. Radio-isotope  results to follow from the department of Nuclear Medicine.           [facility] [place]    D: 10/30/2020 14:33:20       T: 10/30/2020 14:35:00     /LISBETH  Job#: [id number]     Doc#: Unknown    CC:    ()

## 2020-11-01 NOTE — DISCHARGE SUMMARY
melatonin (RA MELATONIN) 3 MG TABS tablet  Take 1 tablet by mouth daily             metFORMIN (GLUCOPHAGE) 1000 MG tablet  Take 1 tablet by mouth daily (with breakfast)             omeprazole (PRILOSEC) 20 MG delayed release capsule               SITagliptin (JANUVIA) 100 MG tablet  Take 1 tablet by mouth daily             traZODone (DESYREL) 150 MG tablet  Take 1 tablet by mouth nightly                 Diet:  No diet orders on file , Advance as tolerated     Activity:  As tolerated    Consultants: IP CONSULT TO CARDIOLOGY    Procedures:  Not indicated     Diagnostic Test:   Results for orders placed or performed during the hospital encounter of 10/29/20   CBC Auto Differential   Result Value Ref Range    WBC 4.8 3.5 - 11.3 k/uL    RBC 3.27 (L) 3.95 - 5.11 m/uL    Hemoglobin 10.6 (L) 11.9 - 15.1 g/dL    Hematocrit 34.2 (L) 36.3 - 47.1 %    .6 (H) 82.6 - 102.9 fL    MCH 32.4 25.2 - 33.5 pg    MCHC 31.0 28.4 - 34.8 g/dL    RDW 12.0 11.8 - 14.4 %    Platelets 015 341 - 627 k/uL    MPV 10.0 8.1 - 13.5 fL    NRBC Automated 0.0 0.0 per 100 WBC    Differential Type NOT REPORTED     Seg Neutrophils 43 36 - 65 %    Lymphocytes 41 24 - 43 %    Monocytes 11 3 - 12 %    Eosinophils % 4 1 - 4 %    Basophils 1 0 - 2 %    Immature Granulocytes 0 0 %    Segs Absolute 2.02 1.50 - 8.10 k/uL    Absolute Lymph # 1.95 1.10 - 3.70 k/uL    Absolute Mono # 0.54 0.10 - 1.20 k/uL    Absolute Eos # 0.21 0.00 - 0.44 k/uL    Basophils Absolute 0.05 0.00 - 0.20 k/uL    Absolute Immature Granulocyte <0.03 0.00 - 0.30 k/uL    WBC Morphology NOT REPORTED     RBC Morphology MACROCYTOSIS PRESENT     Platelet Estimate NOT REPORTED    Basic Metabolic Panel w/ Reflex to MG   Result Value Ref Range    Glucose 84 70 - 99 mg/dL    BUN 27 (H) 6 - 20 mg/dL    CREATININE 1.00 (H) 0.50 - 0.90 mg/dL    Bun/Cre Ratio NOT REPORTED 9 - 20    Calcium 9.4 8.6 - 10.4 mg/dL    Sodium 137 135 - 144 mmol/L    Potassium 4.8 3.7 - 5.3 mmol/L    Chloride 104 98 - 107 mmol/L    CO2 21 20 - 31 mmol/L    Anion Gap 12 9 - 17 mmol/L    GFR Non-African American 58 (L) >60 mL/min    GFR African American >60 >60 mL/min    GFR Comment          GFR Staging NOT REPORTED    Troponin   Result Value Ref Range    Troponin, High Sensitivity 13 0 - 14 ng/L    Troponin T NOT REPORTED <0.03 ng/mL    Troponin Interp NOT REPORTED    Brain Natriuretic Peptide   Result Value Ref Range    Pro-BNP 48 <300 pg/mL    BNP Interpretation Pro-BNP Reference Range:    Troponin   Result Value Ref Range    Troponin, High Sensitivity 11 0 - 14 ng/L    Troponin T NOT REPORTED <0.03 ng/mL    Troponin Interp NOT REPORTED    Comprehensive Metabolic Panel w/ Reflex to MG   Result Value Ref Range    Glucose 108 (H) 70 - 99 mg/dL    BUN 22 (H) 6 - 20 mg/dL    CREATININE 0.92 (H) 0.50 - 0.90 mg/dL    Bun/Cre Ratio NOT REPORTED 9 - 20    Calcium 9.0 8.6 - 10.4 mg/dL    Sodium 138 135 - 144 mmol/L    Potassium 4.6 3.7 - 5.3 mmol/L    Chloride 105 98 - 107 mmol/L    CO2 23 20 - 31 mmol/L    Anion Gap 10 9 - 17 mmol/L    Alkaline Phosphatase 72 35 - 104 U/L    ALT 13 5 - 33 U/L    AST 15 <32 U/L    Total Bilirubin <0.10 (L) 0.3 - 1.2 mg/dL    Total Protein 6.7 6.4 - 8.3 g/dL    Alb 3.8 3.5 - 5.2 g/dL    Albumin/Globulin Ratio 1.3 1.0 - 2.5    GFR Non-African American >60 >60 mL/min    GFR African American >60 >60 mL/min    GFR Comment          GFR Staging NOT REPORTED    Troponin   Result Value Ref Range    Troponin, High Sensitivity 13 0 - 14 ng/L    Troponin T NOT REPORTED <0.03 ng/mL    Troponin Interp NOT REPORTED    HCG Qualitative, Serum   Result Value Ref Range    hCG Qual NEGATIVE NEGATIVE   POC Glucose Fingerstick   Result Value Ref Range    POC Glucose 59 (L) 65 - 105 mg/dL   POC Glucose Fingerstick   Result Value Ref Range    POC Glucose 93 65 - 105 mg/dL   POC Glucose Fingerstick   Result Value Ref Range    POC Glucose 85 65 - 105 mg/dL   EKG 12 Lead   Result Value Ref Range    Ventricular Rate 73 BPM Atrial Rate 73 BPM    P-R Interval 162 ms    QRS Duration 82 ms    Q-T Interval 412 ms    QTc Calculation (Bazett) 453 ms    P Axis 62 degrees    R Axis 7 degrees    T Axis 59 degrees   EKG 12 Lead   Result Value Ref Range    Ventricular Rate 77 BPM    Atrial Rate 77 BPM    P-R Interval 166 ms    QRS Duration 86 ms    Q-T Interval 422 ms    QTc Calculation (Bazett) 477 ms    P Axis 66 degrees    R Axis 21 degrees    T Axis 62 degrees     Xr Chest Portable    Result Date: 10/29/2020  EXAMINATION: ONE XRAY VIEW OF THE CHEST 10/29/2020 1:54 pm COMPARISON: 02/21/2019 HISTORY: ORDERING SYSTEM PROVIDED HISTORY: chest pain TECHNOLOGIST PROVIDED HISTORY: chest pain Reason for Exam: upr Acuity: Unknown Type of Exam: Unknown FINDINGS: Stable heart size with mild central vessel prominence and streaky perihilar atelectasis. No overt CHF. No lobar consolidation is seen. No large pleural effusions. Again shown is a benign and appearing calcific density right lung base. No acute osseous abnormality within limits of the exam. Monitor leads overlie the chest.     Minimal perihilar atelectasis. Vl Dup Lower Extremity Venous Bilateral    Result Date: 10/31/2020    [facility]  Vascular Lower Extremities DVT Study Procedure   Patient Name   Hugh Sullivan    Date of Study           10/29/2020                 [facility]   Date of Birth  1965  Gender                  Female   Age            50 year(s)  Race                    Black   Room Number    0345   Corporate ID # [id number]   Patient Acct # [de-identified]   MR #           [id number]     [facility]rzysztof Sethi [facility]   Accession #    [id number]  Interpreting Physician  Will Juan   Referring                  Referring Physician     ER MD *  Nurse  Practitioner  Procedure Type of Study:   Veins: Lower Extremities DVT Study, Venous Scan Lower Bilateral.  Indications for Study:Swelling. Patient Status:ER. Technical Quality:Limited visualization.  Limitation reason:Swelling. Conclusions   Summary   No evidence of superficial or deep venous thrombosis in both lower  extremities. Enlarged lymph nodes noted in the left groin. Signature   ----------------------------------------------------------------  Electronically signed by Jessica Weiner RVT(Sonographer) on  10/29/2020 06:20 PM  ----------------------------------------------------------------   ----------------------------------------------------------------  Electronically signed by Bhaskar Hu(Interpreting physician)  on 10/31/2020 12:21 AM  ----------------------------------------------------------------  Findings:   Right Impression:                    Left Impression:  The common femoral, femoral,         The common femoral, femoral,  popliteal and tibial veins           popliteal and tibial veins  demonstrate normal compressibility   demonstrate normal compressibility  and augmentation. and augmentation. Normal compressibility of the great  Normal compressibility of the great  saphenous vein. saphenous vein. Normal compressibility of the small  Normal compressibility of the small  saphenous vein. saphenous vein. Enlarged lymph nodes are noted at the                                       left groin level. Velocities are measured in cm/s ; Diameters are measured in cm Right Lower Extremities DVT Study Measurements Right 2D Measurements +------------------------------------+----------+---------------+----------+ ! Location                            ! Visualized! Compressibility! Thrombosis! +------------------------------------+----------+---------------+----------+ ! Common Femoral                      !Yes       ! Yes            ! None      ! +------------------------------------+----------+---------------+----------+ ! Prox Femoral                        !Yes       ! Yes            ! None      ! Measurements +---------------------------+------+------+--------------------------------+ ! Location                   ! Signal!Reflux! Reflux (msec)                   ! +---------------------------+------+------+--------------------------------+ ! Common Femoral             !Phasic!      !                                ! +---------------------------+------+------+--------------------------------+ ! Prox Femoral               !Phasic!      !                                ! +---------------------------+------+------+--------------------------------+ ! Popliteal                  !Phasic!      !                                ! +---------------------------+------+------+--------------------------------+ Left Lower Extremities DVT Study Measurements Left 2D Measurements +------------------------------------+----------+---------------+----------+ ! Location                            ! Visualized! Compressibility! Thrombosis! +------------------------------------+----------+---------------+----------+ ! Common Femoral                      !Yes       ! Yes            ! None      ! +------------------------------------+----------+---------------+----------+ ! Prox Femoral                        !Yes       ! Yes            ! None      ! +------------------------------------+----------+---------------+----------+ ! Mid Femoral                         !Yes       ! Yes            ! None      ! +------------------------------------+----------+---------------+----------+ ! Dist Femoral                        !Yes       ! Yes            ! None      ! +------------------------------------+----------+---------------+----------+ ! Deep Femoral                        !No        !               !          ! +------------------------------------+----------+---------------+----------+ ! Popliteal                           !Yes       ! Yes            ! None      ! +------------------------------------+----------+---------------+----------+ ! Sapheno Femoral Junction            ! Yes       ! Yes            ! None      ! +------------------------------------+----------+---------------+----------+ ! PTV                                 ! Partial   !Yes            ! None      ! +------------------------------------+----------+---------------+----------+ ! Peroneal                            !Partial   !Yes            ! None      ! +------------------------------------+----------+---------------+----------+ ! Gastroc                             ! Yes       ! Yes            ! None      ! +------------------------------------+----------+---------------+----------+ ! GSV Thigh                           ! Yes       ! Yes            ! None      ! +------------------------------------+----------+---------------+----------+ ! GSV Knee                            ! Yes       ! Yes            ! None      ! +------------------------------------+----------+---------------+----------+ ! GSV Ankle                           ! Yes       ! Yes            ! None      ! +------------------------------------+----------+---------------+----------+ ! SSV                                 ! Yes       ! Yes            ! None      ! +------------------------------------+----------+---------------+----------+ Left Doppler Measurements +---------------------------+------+------+--------------------------------+ ! Location                   ! Signal!Reflux! Reflux (msec)                   ! +---------------------------+------+------+--------------------------------+ ! Common Femoral             !Phasic!      !                                ! +---------------------------+------+------+--------------------------------+ ! Prox Femoral               !Phasic!      !                                ! +---------------------------+------+------+--------------------------------+ ! Popliteal                  !Phasic!      !                                ! +---------------------------+------+------+--------------------------------+    Nm Cardiac Stress Test Nuclear Imaging    Result Date: 10/30/2020  EXAMINATION: MYOCARDIAL PERFUSION IMAGING 10/30/2020 10:47 am TECHNIQUE: For the rest study, 51 mCi of Tc 99 labeled sestamibi were injected. SPECT images were acquired. Under cardiology supervision, 0.4mg Idelia Plump was infused. After pharmacologic stress, 14.3 mCi of Tc 99 labeled sestamibi were injected. SPECT images with ECG gating were acquired. COMPARISON: None Available. HISTORY: ORDERING SYSTEM PROVIDED HISTORY: Chest pain TECHNOLOGIST PROVIDED HISTORY: Reason for Exam: Chest pain Procedure Type->Rx chest pain Is the patient pregnant?->No Reason for Exam: chest pain  left anterior chest with radiation to left arm, Dizziness, HTN, DM Type II, chronic renal insufficiency and history of polysubstance abuse FINDINGS: Images interpreted utilizing [employer] and [employer]. There is normal distribution of radiotracer throughout the myocardium without evidence for a significant reversible or fixed perfusion defect. Perfusion scores are visually adjusted to account for artifact. Summed stress score:  0 Summed rest score:  0 Summed reversibility score:  0 Function: End diastolic volume:  63FP Left ventricular ejection fraction:  57% Wall motion abnormalities:  None. TID score:  0.97 (scores greater than 1.39 are considered elevated for Lexiscan stress with Tc99m)     1. No discrete perfusion abnormality to suggest myocardial ischemia/infarction. 2. No wall motion abnormality. Calculated ejection fraction of 57%. 3. Risk stratification: Low Notes concerning risk stratification: Risk stratification incorporates both clinical history and some testing results. Final risk determination is the responsibility of the ordering provider as other patient information and test results may increase or decrease the risk assessment reported for this examination.  Risk stratification criteria are adapted from \"Noninvasive Risk Stratification\" criteria from [facility] et.  Kelly Degroot, ACC/AATS/AHA/ASE/ASNC/SCAI/SCCT/STS 2017 Appropriate Use Criteria For Coronary Revascularization in Patients With Stable Ischemic Heart Disease [facility] Volume 69, Issue 17, May 2017 High risk (>3% annual death or MI) 1. Severe resting LV dysfunction (LVEF <35%) not readily explained by non coronary causes 2. Resting perfusion abnormalities greater than 10% of the myocardium in patients without prior history or evidence of MI3. Stress-induced perfusion abnormalities encumbering greater than or equal to 10% myocardium or stress segmental scores indicating multiple vascular territories with abnormalities 4. Stress-induced LV dilatation (TID ratio greater than 1.19 for exercise and greater than 1.39 for regadenoson) Intermediate risk (1% to 3% annual death or MI) 1. Mild/moderate resting LV dysfunction (LVEF 35% to 49%) not readily explained by non coronary causes. 2. Resting perfusion abnormalities in 5%-9.9% of the myocardium in patients without a history or prior evidence of MI 3. Stress-induced perfusion abnormality encumbering 5%-9.9% of the myocardium or stress segmental scores indicating 1 vascular territory with abnormalities but without LV dilation 4. Small wall motion abnormality involving 1-2 segments and only 1 coronary bed. Low Risk (Less than 1% annual death or MI) 1. Normal or small myocardial perfusion defect at rest or with stress encumbering less than 5% of the myocardium. Physical Exam:    General appearance - NAD, AOx 3   Lungs -CTAB, no R/R/R  Heart - RRR, no M/R/G  Abdomen - Soft, NT/ND  Neurological:  MAEx4, No focal motor deficit, sensory loss  Extremities - Cap refil <2 sec in all ext., no edema  Skin -warm, dry      Hospital Course:  Clinical course has improved, labs and imaging reviewed. Marco A Reyes originally presented to the hospital on 10/29/2020  1:43 PM. with acute chest pain.   At that time it was determined that She required further observation and pain control, further cardiac work-up. She was admitted and labs and imaging were followed daily. Imaging results as above. She is medically stable to be discharged. Disposition: Home    Patient stated that they will not drive themselves home from the hospital if they have gotten pain killers/ narcotics earlier that day and that they will arrange for transportation on their own or work with the  for a ride. Patient counseled NOT to drive while under the influence of narcotics/ pain killers. Condition: Good    Patient stable and ready for discharge home. I have discussed plan of care with patient and they are in understanding. They were instructed to read discharge paperwork. All of their questions and concerns were addressed. Time Spent: 1 day      --  Xavi Murphy DO  Emergency Medicine Resident Physician    This dictation was generated by voice recognition computer software. Although all attempts are made to edit the dictation for accuracy, there may be errors in the transcription that are not intended.

## 2020-11-03 PROBLEM — I63.9 CEREBROVASCULAR ACCIDENT (CVA) (HCC): Status: RESOLVED | Noted: 2017-06-14 | Resolved: 2020-11-03

## 2020-11-04 ENCOUNTER — HOSPITAL ENCOUNTER (OUTPATIENT)
Age: 55
Setting detail: SPECIMEN
Discharge: HOME | End: 2020-11-04
Payer: COMMERCIAL

## 2020-11-04 LAB
ESTIMATED AVERAGE GLUCOSE: 154 MG/DL
HBA1C MFR BLD: 7 % (ref 4–6)

## 2020-11-06 ENCOUNTER — OFFICE VISIT (OUTPATIENT)
Dept: FAMILY MEDICINE CLINIC | Age: 55
End: 2020-11-06
Payer: COMMERCIAL

## 2020-11-06 ENCOUNTER — TELEPHONE (OUTPATIENT)
Dept: FAMILY MEDICINE CLINIC | Age: 55
End: 2020-11-06

## 2020-11-06 VITALS
BODY MASS INDEX: 39.05 KG/M2 | HEART RATE: 83 BPM | TEMPERATURE: 97.2 F | WEIGHT: 243 LBS | HEIGHT: 66 IN | DIASTOLIC BLOOD PRESSURE: 68 MMHG | SYSTOLIC BLOOD PRESSURE: 114 MMHG

## 2020-11-06 PROCEDURE — 99213 OFFICE O/P EST LOW 20 MIN: CPT | Performed by: STUDENT IN AN ORGANIZED HEALTH CARE EDUCATION/TRAINING PROGRAM

## 2020-11-06 PROCEDURE — 3051F HG A1C>EQUAL 7.0%<8.0%: CPT | Performed by: STUDENT IN AN ORGANIZED HEALTH CARE EDUCATION/TRAINING PROGRAM

## 2020-11-06 PROCEDURE — 99211 OFF/OP EST MAY X REQ PHY/QHP: CPT | Performed by: FAMILY MEDICINE

## 2020-11-06 RX ORDER — ACETAMINOPHEN 325 MG/1
650 TABLET ORAL EVERY 6 HOURS PRN
Qty: 30 TABLET | Refills: 0 | Status: SHIPPED | OUTPATIENT
Start: 2020-11-06 | End: 2020-12-02

## 2020-11-06 RX ORDER — FLUOXETINE 10 MG/1
60 CAPSULE ORAL DAILY
Status: ON HOLD | COMMUNITY
End: 2021-03-12

## 2020-11-06 RX ORDER — FLUCONAZOLE 100 MG/1
100 TABLET ORAL DAILY
Qty: 3 TABLET | Refills: 0 | Status: SHIPPED | OUTPATIENT
Start: 2020-11-06 | End: 2020-11-09

## 2020-11-06 RX ORDER — VENLAFAXINE HYDROCHLORIDE 75 MG/1
CAPSULE, EXTENDED RELEASE ORAL
Status: ON HOLD | COMMUNITY
Start: 2020-11-02 | End: 2021-02-09 | Stop reason: HOSPADM

## 2020-11-06 ASSESSMENT — ENCOUNTER SYMPTOMS
COLOR CHANGE: 0
NAUSEA: 0
ABDOMINAL PAIN: 0
SORE THROAT: 0
WHEEZING: 0
COUGH: 0
ABDOMINAL DISTENTION: 0
ANAL BLEEDING: 0
SINUS PRESSURE: 0
SHORTNESS OF BREATH: 0
DIARRHEA: 0
SINUS PAIN: 0
CHEST TIGHTNESS: 0

## 2020-11-06 NOTE — PROGRESS NOTES
Subjective:    Sean Hatchet is a 50 y.o. female with  has a past medical history of Acid reflux, Acute cystitis with hematuria, Acute non-recurrent maxillary sinusitis, Asthma, Bipolar 1 disorder ([address] Bipolar disorder, mixed ([address] BMI 34.0-34.9,adult, Cannabis use disorder, severe, dependence ([address] Cerebrovascular accident (CVA) ([address] Chest pain, Chronic renal insufficiency, Cocaine abuse ([address] DDD (degenerative disc disease), cervical, Diabetes mellitus ([address] Dizziness, Fibromyalgia, History of stroke, Homicidal ideation, Hyperglycemia, Hypertension, Hypotension, IDDM (insulin dependent diabetes mellitus), Lupus ([address] Migraine, Neuropathy, Neuropathy, Polysubstance abuse ([address] Post traumatic stress disorder (PTSD), Posttraumatic stress disorder, Recurrent depression ([address] Recurrent major depressive disorder, in partial remission ([address] Screening mammogram, encounter for, Severe recurrent major depression with psychotic features ([address] Severe recurrent major depression without psychotic features ([address] Stroke (cerebrum) ([address] Stroke ([address] Suicidal ideation, Suicidal intent, Vitamin D deficiency, and White matter changes. Presented to the office today for:  Chief Complaint   Patient presents with    2 Week Follow-Up     diabetes/ was [facility] for chest pain, they did lab work     Sinusitis     runny nose/ pressure in head     Referral - General     eye dr sooner than dec 9th     Other     poss yeast infec/itching, discharge white    Health Maintenance     due for pap/declined flu, pneumo and heb b vaccine/ no DM eye report/       HPI    Pt is here for diabetes follow up. Requesting a refill on her Tylenol. Patient is also complaining of vaginal itching for few days. Denies any fevers, chills, nausea, vomiting. No change in vaginal discharge. Has been experiencing menopausal symptoms for 1 year, last menstrual period 1 year ago.     Patient is also requesting a ophthalmology referral.    Review of Systems   Constitutional: Negative for fatigue and fever. HENT: Negative for sinus pressure, sinus pain, sore throat and tinnitus. Eyes: Negative for visual disturbance. Respiratory: Negative for cough, chest tightness, shortness of breath and wheezing. Cardiovascular: Negative for chest pain, palpitations and leg swelling. Gastrointestinal: Negative for abdominal distention, abdominal pain, anal bleeding, diarrhea and nausea. Genitourinary: Negative for enuresis, frequency and urgency. Vaginal itching   Musculoskeletal: Negative for arthralgias, gait problem and neck stiffness. Skin: Negative for color change and rash. Neurological: Negative for dizziness and headaches. Psychiatric/Behavioral: Negative for agitation and confusion. The patient has a   Family History   Problem Relation Age of Onset    Diabetes Mother     Stroke Mother     Diabetes Father     Diabetes Sister     Diabetes Brother     Other Son         GSW    No Known Problems Sister        Objective:    /68 (Site: Left Upper Arm, Position: Sitting, Cuff Size: Medium Adult) Comment: machine  Pulse 83   Temp 97.2 °F (36.2 °C) (Temporal)   Ht 5' 6\" (1.676 m)   Wt 243 lb (110.2 kg)   LMP  (LMP Unknown)   BMI 39.22 kg/m²    BP Readings from Last 3 Encounters:   11/06/20 114/68   10/30/20 121/86   10/20/20 129/76       Physical Exam  Constitutional:       Appearance: Normal appearance. HENT:      Head: Atraumatic. Mouth/Throat:      Mouth: Mucous membranes are moist.      Pharynx: No oropharyngeal exudate or posterior oropharyngeal erythema. Eyes:      Extraocular Movements: Extraocular movements intact. Neck:      Musculoskeletal: Normal range of motion. Cardiovascular:      Rate and Rhythm: Normal rate and regular rhythm. Heart sounds: No murmur. No friction rub. No gallop. Pulmonary:      Effort: Pulmonary effort is normal.      Breath sounds:  No wheezing, rhonchi or rales. Abdominal:      General: Abdomen is flat. Tenderness: There is no abdominal tenderness. There is no guarding. Hernia: No hernia is present. Musculoskeletal: Normal range of motion. General: No swelling or tenderness. Skin:     General: Skin is warm. Capillary Refill: Capillary refill takes less than 2 seconds. Coloration: Skin is not jaundiced. Findings: No bruising. Neurological:      Mental Status: She is alert and oriented to person, place, and time. Lab Results   Component Value Date    WBC 4.8 10/29/2020    HGB 10.6 (L) 10/29/2020    HCT 34.2 (L) 10/29/2020     10/29/2020    CHOL 190 12/18/2019    TRIG 90 12/18/2019    HDL 72 12/18/2019    ALT 13 10/30/2020    AST 15 10/30/2020     10/30/2020    K 4.6 10/30/2020     10/30/2020    CREATININE 0.92 (H) 10/30/2020    BUN 22 (H) 10/30/2020    CO2 23 10/30/2020    TSH 0.81 07/08/2020    LABA1C 7.0 (H) 11/04/2020    LABMICR 20 10/20/2020     Lab Results   Component Value Date    CALCIUM 9.0 10/30/2020     Lab Results   Component Value Date    LDLCHOLESTEROL 100 12/18/2019       Assessment and Plan:    1. Vaginal candidiasis  - fluconazole (DIFLUCAN) 100 MG tablet; Take 1 tablet by mouth daily for 3 days  Dispense: 3 tablet; Refill: 0  -If symptoms unresolved with Diflucan, patient might benefit from topical estrogen cream.    2. Vision changes  - MAEM Fuentes MD, Ophthalmology, [facility]    3. Type 2 diabetes mellitus with other kidney complication, unspecified whether long term insulin use (HCC)  - MAME Fuentes MD, Ophthalmology, [facility]  -Patient will schedule her diabetic eye exam with ophthalmology. 4. Encounter for screening mammogram for breast cancer  - [facility] Digital Screen Bilateral [[id number]];  Future          Requested Prescriptions     Signed Prescriptions Disp Refills    fluconazole (DIFLUCAN) 100 MG tablet 3 tablet 0     Sig: Take 1 tablet by mouth daily for 3 days    acetaminophen (TYLENOL) 325 MG tablet 30 tablet 0     Sig: Take 2 tablets by mouth every 6 hours as needed for Pain       Medications Discontinued During This Encounter   Medication Reason    acetaminophen (TYLENOL) 325 MG tablet REORDER       Terri received counseling on the following healthy behaviors: nutrition, exercise and medication adherence    Discussed use,benefit, and side effects of prescribed medications. Barriers to medication compliance addressed. All patient questions answered. Pt voiced understanding. Return in about 2 months (around 1/6/2021) for follow up. Disclaimer: Some orall of this note was transcribed using voice-recognition software. This may cause typographical errors occasionally. Although all effort is made to fix these errors, please do not hesitate to contact our office if there Prakash Goodness concern with the understanding of this note.

## 2020-11-06 NOTE — PROGRESS NOTES
I have reviewed and discussed key elements of [facility] with the resident including plan of care and follow up and agree with the care lizandro plan.

## 2020-11-06 NOTE — PROGRESS NOTES
Visit Information    Have you changed or started any medications since your last visit including any over-the-counter medicines, vitamins, or herbal medicines? no   Have you stopped taking any of your medications? Is so, why? -  yes - see list  Are you having any side effects from any of your medications? - no    Have you seen any other physician or provider since your last visit?  no   Have you had any other diagnostic tests since your last visit?  no   Have you been seen in the emergency room and/or had an admission in a hospital since we last saw you?  yes - [facility]    Have you had your routine dental cleaning in the past 6 months?  no     Do you have an active MyChart account? If no, what is the barrier?   No: pending    Patient Care Team:  Amadeo Caballero MD as PCP - General (Family Medicine)  Darien Esquivel MD as Consulting Physician (Infectious Diseases)    Medical History Review  Past Medical, Family, and Social History reviewed and does not contribute to the patient presenting condition    Health Maintenance   Topic Date Due    Pneumococcal 0-64 years Vaccine (1 of 1 - PPSV23) 05/19/1973    Diabetic retinal exam  05/19/1977    HIV screen  05/19/1982    Hepatitis B vaccine (1 of 3 - Risk 3-dose series) 05/19/1986    Cervical cancer screen  05/19/1988    Breast cancer screen  05/19/2017    Shingles Vaccine (1 of 2) 05/19/2017    Colon cancer screen colonoscopy  05/19/2017    Flu vaccine (1) 10/20/2021 (Originally 9/1/2020)    Lipid screen  12/18/2020    Diabetic foot exam  10/20/2021    Diabetic microalbuminuria test  10/20/2021    A1C test (Diabetic or Prediabetic)  11/04/2021    DTaP/Tdap/Td vaccine (3 - Td) 01/28/2030    Hepatitis A vaccine  Aged Out    Hib vaccine  Aged Out    Meningococcal (ACWY) vaccine  Aged Out

## 2020-11-09 ENCOUNTER — TELEPHONE (OUTPATIENT)
Dept: FAMILY MEDICINE CLINIC | Age: 55
End: 2020-11-09

## 2020-11-09 NOTE — TELEPHONE ENCOUNTER
Please place external referral to opthalmology. Thank you      PC said the place referred to for opthalmology said we have to make the first  appointment. I called and spoke to Sahil Hernandez she stated they dont accept caresource will need new referral to different location that accepts caresource. Called patient inform will ask for external referral, she would need to call insurance to see what providers are in network. Scheduling Instructions: [facility]   [address] [facility],[address]   [phone]

## 2020-11-10 NOTE — TELEPHONE ENCOUNTER
Pt states she needs refills on the Free Style lancets, the freestyle lite strips, alcohol pads and also wants to know if she can get a refill of stool softener but PT was unsure of the name of that prescription.

## 2020-11-10 NOTE — TELEPHONE ENCOUNTER
[facility] received a call from:    Name of Caller: Terri    Relationship to patient:self    Organization name: n/a    Best contact number: [phone]    Reason for call: pt calling back in to make sure stool softener and alcohol pads are called in as well, please advise.

## 2020-11-11 RX ORDER — LANCETS 28 GAUGE
EACH MISCELLANEOUS
Qty: 100 EACH | OUTPATIENT
Start: 2020-11-11

## 2020-11-11 RX ORDER — LANCETS 28 GAUGE
1 EACH MISCELLANEOUS 3 TIMES DAILY
Qty: 100 EACH | Refills: 5 | Status: ON HOLD
Start: 2020-11-11 | End: 2021-11-08 | Stop reason: HOSPADM

## 2020-11-11 RX ORDER — UBIQUINOL 100 MG
1 CAPSULE ORAL PRN
Qty: 100 EACH | Refills: 11 | Status: ON HOLD | OUTPATIENT
Start: 2020-11-11 | End: 2021-09-22

## 2020-11-11 RX ORDER — DOCUSATE SODIUM 100 MG/1
100 CAPSULE, LIQUID FILLED ORAL 2 TIMES DAILY
Qty: 60 CAPSULE | Refills: 0 | Status: SHIPPED | OUTPATIENT
Start: 2020-11-11 | End: 2020-12-09

## 2020-11-11 RX ORDER — BLOOD-GLUCOSE METER
KIT MISCELLANEOUS
Qty: 100 EACH | OUTPATIENT
Start: 2020-11-11

## 2020-11-11 RX ORDER — BLOOD-GLUCOSE METER
1 KIT MISCELLANEOUS 3 TIMES DAILY
Qty: 100 EACH | Refills: 5 | Status: SHIPPED | OUTPATIENT
Start: 2020-11-11 | End: 2022-03-15 | Stop reason: SDUPTHER

## 2020-11-11 NOTE — TELEPHONE ENCOUNTER
Toi Request for pending medications. Last Visit Date: 11/6/2020  Next Visit Date:  Future Appointments   Date Time Provider Jj Hernandez   1/8/2021  7:30 AM ST[facility] DIAG MAMMO RM [facility] MAMMO [facility]       Health Maintenance   Topic Date Due    Pneumococcal 0-64 years Vaccine (1 of 1 - PPSV23) 05/19/1973    Diabetic retinal exam  05/19/1977    HIV screen  05/19/1982    Hepatitis B vaccine (1 of 3 - Risk 3-dose series) 05/19/1986    Cervical cancer screen  05/19/1988    Breast cancer screen  05/19/2017    Shingles Vaccine (1 of 2) 05/19/2017    Colon cancer screen colonoscopy  05/19/2017    Flu vaccine (1) 10/20/2021 (Originally 9/1/2020)    Lipid screen  12/18/2020    Diabetic foot exam  10/20/2021    Diabetic microalbuminuria test  10/20/2021    A1C test (Diabetic or Prediabetic)  11/04/2021    DTaP/Tdap/Td vaccine (3 - Td) 01/28/2030    Hepatitis A vaccine  Aged Out    Hib vaccine  Aged Out    Meningococcal (ACWY) vaccine  Aged Out       Hemoglobin A1C (%)   Date Value   11/04/2020 7.0 (H)   07/07/2020 15.3 (H)   11/26/2019 6.1 (H)             ( goal A1C is < 7)   Microalb/Crt.  Ratio (mcg/mg creat)   Date Value   10/20/2020 20     LDL Cholesterol (mg/dL)   Date Value   12/18/2019 100       (goal LDL is <100)   AST (U/L)   Date Value   10/30/2020 15     ALT (U/L)   Date Value   10/30/2020 13     BUN (mg/dL)   Date Value   10/30/2020 22 (H)     BP Readings from Last 3 Encounters:   11/06/20 114/68   10/30/20 121/86   10/20/20 129/76          (goal 120/80)    All Future Testing planned in [facility]  Lab Frequency Next Occurrence   [facility] Digital Screen Bilateral [[id number]] Once 11/06/2020       Next Visit Date:  Future Appointments   Date Time Provider Jj Hernandez   1/8/2021  7:30 AM STC DIAG MAMMO RM [facility] MAMMO [facility]         Patient Active Problem List:     IDDM (insulin dependent diabetes mellitus)     Lupus ([facility]     Bipolar disorder, mixed ([facility]     Post traumatic stress

## 2020-11-16 ENCOUNTER — TELEPHONE (OUTPATIENT)
Dept: FAMILY MEDICINE CLINIC | Age: 55
End: 2020-11-16

## 2020-11-16 NOTE — TELEPHONE ENCOUNTER
Pt says her prescription that was called in on Friday has still not been filled for her and she is completely out of medication. She is experiencing pain all over her body and needs her medication approved and sent it by her doctor as soon as possible. Medication can be sent to the [facility]. Pt would like a call back when the medication has been sent in for her.

## 2020-11-17 NOTE — TELEPHONE ENCOUNTER
Patient requesting refills for Melatonin, Trazadone, Abilify, Zyrtec, Omeprazole and Glipizide  Uses [facility] [phone]

## 2020-11-17 NOTE — TELEPHONE ENCOUNTER
Arthritis     Chronic back pain     Acid reflux     History of stroke     Polysubstance abuse ([address]     Bipolar 1 disorder (HCC)     Cocaine abuse (HCC)     Chest pain     Acute non-recurrent maxillary sinusitis     Acute respiratory failure with hypercapnia (HCC)     Alcohol use disorder, severe, dependence (HCC)     Asthma exacerbation attacks     Bilateral edema of lower extremity     Bipolar 1 disorder, depressed, severe (HCC)     BMI 34.0-34.9,adult     Cannabis use disorder, severe, dependence (HCC)     Cocaine use disorder, severe, dependence ([address]     Dizziness     Dyslipidemia     Family history of colon cancer     History of lupus     Homicidal ideation     Hypotension     Neuropathy     Normocytic anemia     Recurrent depression (HCC)     Recurrent major depressive disorder, in partial remission (HCC)     Severe recurrent major depression with psychotic features (HCC)     Severe recurrent major depression without psychotic features ([address]     Upper GI bleed     Vitamin D deficiency     White matter changes     Gastroesophageal reflux disease     Stroke (cerebrum) (HCC)     Rib lesion     Type 2 diabetes mellitus (HCC)     YAEL (generalized anxiety disorder)     Posttraumatic stress disorder     Suicidal intent     Leg weakness, bilateral     Poorly controlled diabetes mellitus ([address]

## 2020-11-18 RX ORDER — OMEPRAZOLE 20 MG/1
20 CAPSULE, DELAYED RELEASE ORAL DAILY
Qty: 30 CAPSULE | Refills: 3 | Status: ON HOLD
Start: 2020-11-18 | End: 2021-06-14 | Stop reason: HOSPADM

## 2020-11-18 RX ORDER — CETIRIZINE HYDROCHLORIDE 10 MG/1
10 TABLET ORAL DAILY
Qty: 30 TABLET | Refills: 0 | Status: SHIPPED | OUTPATIENT
Start: 2020-11-18 | End: 2020-12-25

## 2020-11-18 RX ORDER — GLIPIZIDE 10 MG/1
10 TABLET ORAL
Qty: 60 TABLET | Refills: 3 | Status: SHIPPED | OUTPATIENT
Start: 2020-11-18 | End: 2021-01-13 | Stop reason: ALTCHOICE

## 2020-11-18 RX ORDER — ARIPIPRAZOLE 10 MG/1
10 TABLET ORAL DAILY
Qty: 30 TABLET | Refills: 5 | Status: SHIPPED | OUTPATIENT
Start: 2020-11-18 | End: 2020-12-09

## 2020-11-18 RX ORDER — TRAZODONE HYDROCHLORIDE 150 MG/1
150 TABLET ORAL NIGHTLY
Qty: 14 TABLET | Refills: 5 | Status: SHIPPED | OUTPATIENT
Start: 2020-11-18 | End: 2021-11-15 | Stop reason: SDUPTHER

## 2020-11-19 ENCOUNTER — TELEPHONE (OUTPATIENT)
Dept: FAMILY MEDICINE CLINIC | Age: 55
End: 2020-11-19

## 2020-12-08 ENCOUNTER — TELEPHONE (OUTPATIENT)
Dept: FAMILY MEDICINE CLINIC | Age: 55
End: 2020-12-08

## 2020-12-09 RX ORDER — CELECOXIB 200 MG/1
CAPSULE ORAL
Qty: 60 CAPSULE | OUTPATIENT
Start: 2020-12-09

## 2020-12-09 NOTE — TELEPHONE ENCOUNTER
Last visit:   Last Med refill:   Does patient have enough medication for 72 hours: No:     Next Visit Date:  Future Appointments   Date Time Provider Jj Hernandez   1/8/2021  7:30 AM STC DIAG MAMMO RM ST[facility]Z MAMMO [facility]       Health Maintenance   Topic Date Due    Pneumococcal 0-64 years Vaccine (1 of 1 - PPSV23) 05/19/1973    Diabetic retinal exam  05/19/1977    HIV screen  05/19/1982    Hepatitis B vaccine (1 of 3 - Risk 3-dose series) 05/19/1986    Cervical cancer screen  05/19/1988    Breast cancer screen  05/19/2017    Shingles Vaccine (1 of 2) 05/19/2017    Colon cancer screen colonoscopy  05/19/2017    Lipid screen  12/18/2020    Flu vaccine (1) 10/20/2021 (Originally 9/1/2020)    Diabetic foot exam  10/20/2021    Diabetic microalbuminuria test  10/20/2021    A1C test (Diabetic or Prediabetic)  11/04/2021    DTaP/Tdap/Td vaccine (3 - Td) 01/28/2030    Hepatitis A vaccine  Aged Out    Hib vaccine  Aged Out    Meningococcal (ACWY) vaccine  Aged Out       Hemoglobin A1C (%)   Date Value   11/04/2020 7.0 (H)   07/07/2020 15.3 (H)   11/26/2019 6.1 (H)             ( goal A1C is < 7)   Microalb/Crt.  Ratio (mcg/mg creat)   Date Value   10/20/2020 20     LDL Cholesterol (mg/dL)   Date Value   12/18/2019 100   12/09/2019 70       (goal LDL is <100)   AST (U/L)   Date Value   10/30/2020 15     ALT (U/L)   Date Value   10/30/2020 13     BUN (mg/dL)   Date Value   10/30/2020 22 (H)     BP Readings from Last 3 Encounters:   11/06/20 114/68   10/30/20 121/86   10/20/20 129/76          (goal 120/80)    All Future Testing planned in [facility]  Lab Frequency Next Occurrence   [facility] Digital Screen Bilateral [[id number]] Once 11/06/2021               Patient Active Problem List:     IDDM (insulin dependent diabetes mellitus)     Lupus (HCC)     Bipolar disorder, mixed ([address]     Post traumatic stress disorder (PTSD)     Acute cystitis with hematuria     Hyperglycemia     Suicidal ideation     Arthritis Chronic back pain     Acid reflux     History of stroke     Polysubstance abuse (HCC)     Bipolar 1 disorder (HCC)     Cocaine abuse (HCC)     Chest pain     Acute non-recurrent maxillary sinusitis     Acute respiratory failure with hypercapnia (HCC)     Alcohol use disorder, severe, dependence (HCC)     Asthma exacerbation attacks     Bilateral edema of lower extremity     Bipolar 1 disorder, depressed, severe (HCC)     BMI 34.0-34.9,adult     Cannabis use disorder, severe, dependence (HCC)     Cocaine use disorder, severe, dependence ([address]     Dizziness     Dyslipidemia     Family history of colon cancer     History of lupus     Homicidal ideation     Hypotension     Neuropathy     Normocytic anemia     Recurrent depression (HCC)     Recurrent major depressive disorder, in partial remission (HCC)     Severe recurrent major depression with psychotic features (HCC)     Severe recurrent major depression without psychotic features ([address]     Upper GI bleed     Vitamin D deficiency     White matter changes     Gastroesophageal reflux disease     Stroke (cerebrum) (HCC)     Rib lesion     Type 2 diabetes mellitus (HCC)     YAEL (generalized anxiety disorder)     Posttraumatic stress disorder     Suicidal intent     Leg weakness, bilateral     Poorly controlled diabetes mellitus ([address]           Please address the medication refill and close the encounter. If I can be of assistance, please route to the applicable pool. Thank you.

## 2020-12-15 NOTE — TELEPHONE ENCOUNTER
Pt made aware reason for denial of celebrex, Pt stated she was in a treatment facility at the time of prescribing and she is now in sober living. She is asking Dr July Chavarria to please take over prescribing this medication as she is having a lot of pain in her feet and legs. Please call to discuss further. FYI- Pt Mobile # has been updated.

## 2020-12-17 NOTE — TELEPHONE ENCOUNTER
Patient calling for refill on celecoxib. States she does not understand why the refill request is being refused when se was just seen on 11/6/2020. Medication pending, please advise.

## 2020-12-18 RX ORDER — CELECOXIB 200 MG/1
CAPSULE ORAL
Qty: 60 CAPSULE | OUTPATIENT
Start: 2020-12-18

## 2020-12-29 ENCOUNTER — TELEPHONE (OUTPATIENT)
Dept: FAMILY MEDICINE CLINIC | Age: 55
End: 2020-12-29

## 2020-12-30 ENCOUNTER — NURSE TRIAGE (OUTPATIENT)
Dept: OTHER | Facility: CLINIC | Age: 55
End: 2020-12-30

## 2020-12-30 ENCOUNTER — TELEPHONE (OUTPATIENT)
Dept: FAMILY MEDICINE CLINIC | Age: 55
End: 2020-12-30

## 2020-12-30 ENCOUNTER — HOSPITAL ENCOUNTER (OUTPATIENT)
Age: 55
Setting detail: SPECIMEN
Discharge: HOME | End: 2020-12-30
Payer: COMMERCIAL

## 2020-12-30 ENCOUNTER — OFFICE VISIT (OUTPATIENT)
Dept: FAMILY MEDICINE CLINIC | Age: 55
End: 2020-12-30
Payer: COMMERCIAL

## 2020-12-30 VITALS
SYSTOLIC BLOOD PRESSURE: 138 MMHG | HEART RATE: 104 BPM | WEIGHT: 249 LBS | TEMPERATURE: 97.3 F | BODY MASS INDEX: 40.02 KG/M2 | HEIGHT: 66 IN | DIASTOLIC BLOOD PRESSURE: 88 MMHG

## 2020-12-30 LAB
BILIRUBIN, POC: NEGATIVE
BLOOD URINE, POC: NEGATIVE
CHOLESTEROL/HDL RATIO: 3.4
CHOLESTEROL: 275 MG/DL
CLARITY, POC: CLEAR
COLOR, POC: YELLOW
GLUCOSE URINE, POC: 100
HBA1C MFR BLD: 9.1 %
HDLC SERPL-MCNC: 80 MG/DL
KETONES, POC: NEGATIVE
LDL CHOLESTEROL: 146 MG/DL (ref 0–130)
LEUKOCYTE EST, POC: NEGATIVE
NITRITE, POC: NEGATIVE
PH, POC: 5.5
PROTEIN, POC: NEGATIVE
SPECIFIC GRAVITY, POC: 1.02
TRIGL SERPL-MCNC: 246 MG/DL
UROBILINOGEN, POC: 0.2
VLDLC SERPL CALC-MCNC: ABNORMAL MG/DL (ref 1–30)

## 2020-12-30 PROCEDURE — 1036F TOBACCO NON-USER: CPT | Performed by: STUDENT IN AN ORGANIZED HEALTH CARE EDUCATION/TRAINING PROGRAM

## 2020-12-30 PROCEDURE — 3046F HEMOGLOBIN A1C LEVEL >9.0%: CPT | Performed by: STUDENT IN AN ORGANIZED HEALTH CARE EDUCATION/TRAINING PROGRAM

## 2020-12-30 PROCEDURE — 81002 URINALYSIS NONAUTO W/O SCOPE: CPT | Performed by: STUDENT IN AN ORGANIZED HEALTH CARE EDUCATION/TRAINING PROGRAM

## 2020-12-30 PROCEDURE — 2022F DILAT RTA XM EVC RTNOPTHY: CPT | Performed by: STUDENT IN AN ORGANIZED HEALTH CARE EDUCATION/TRAINING PROGRAM

## 2020-12-30 PROCEDURE — 83036 HEMOGLOBIN GLYCOSYLATED A1C: CPT | Performed by: STUDENT IN AN ORGANIZED HEALTH CARE EDUCATION/TRAINING PROGRAM

## 2020-12-30 PROCEDURE — G8427 DOCREV CUR MEDS BY ELIG CLIN: HCPCS | Performed by: STUDENT IN AN ORGANIZED HEALTH CARE EDUCATION/TRAINING PROGRAM

## 2020-12-30 PROCEDURE — 99213 OFFICE O/P EST LOW 20 MIN: CPT | Performed by: STUDENT IN AN ORGANIZED HEALTH CARE EDUCATION/TRAINING PROGRAM

## 2020-12-30 PROCEDURE — G8417 CALC BMI ABV UP PARAM F/U: HCPCS | Performed by: STUDENT IN AN ORGANIZED HEALTH CARE EDUCATION/TRAINING PROGRAM

## 2020-12-30 PROCEDURE — 3017F COLORECTAL CA SCREEN DOC REV: CPT | Performed by: STUDENT IN AN ORGANIZED HEALTH CARE EDUCATION/TRAINING PROGRAM

## 2020-12-30 PROCEDURE — G8484 FLU IMMUNIZE NO ADMIN: HCPCS | Performed by: STUDENT IN AN ORGANIZED HEALTH CARE EDUCATION/TRAINING PROGRAM

## 2020-12-30 RX ORDER — INSULIN GLARGINE 100 [IU]/ML
10 INJECTION, SOLUTION SUBCUTANEOUS NIGHTLY
Qty: 5 PEN | Refills: 0 | Status: SHIPPED | OUTPATIENT
Start: 2020-12-30 | End: 2021-01-13

## 2020-12-30 NOTE — PROGRESS NOTES
Subjective:    Ashlee Linda is a 50 y.o. female with  has a past medical history of Acid reflux, Acute cystitis with hematuria, Acute non-recurrent maxillary sinusitis, Asthma, Bipolar 1 disorder ([address] Bipolar disorder, mixed ([address] BMI 34.0-34.9,adult, Cannabis use disorder, severe, dependence ([address] Cerebrovascular accident (CVA) ([address] Chest pain, Chronic renal insufficiency, Cocaine abuse ([address] DDD (degenerative disc disease), cervical, Diabetes mellitus ([address] Dizziness, Fibromyalgia, History of stroke, Homicidal ideation, Hyperglycemia, Hypertension, Hypotension, IDDM (insulin dependent diabetes mellitus), Lupus ([address] Migraine, Neuropathy, Neuropathy, Polysubstance abuse ([address] Post traumatic stress disorder (PTSD), Posttraumatic stress disorder, Recurrent depression ([address] Recurrent major depressive disorder, in partial remission ([address] Screening mammogram, encounter for, Severe recurrent major depression with psychotic features ([address] Severe recurrent major depression without psychotic features ([address] Stroke (cerebrum) ([address] Stroke ([address] Suicidal ideation, Suicidal intent, Vitamin D deficiency, and White matter changes. Presented to the office today for:  Chief Complaint   Patient presents with   [facility]    Health Maintenance     pt refuse all shots       HPI    Patient is a 82-year-old female with past medical history of type 2 diabetes, polysubstance drug abuse, bipolar, and lupus who presents today for \"high blood sugars\". States that recently at home her blood sugars have been running in the 490s and that at 1 point it read \"too high to read \". She also reports polydipsia, polyuria, nausea, abdominal pain, lightheadedness, fatigue, and body aches. She reports taking all 3 of her antidiabetic medications. She still able to tolerate fluids and is drinking some today. Has any known previous history of DKA.   Denies fevers, cough, headache, vomiting, diarrhea, dysuria, hematuria, or any other complaints at this time. Of note, her daughter  less than 2 weeks ago, and her son was killed in 2016. She says that her support system currently involves Narcotics Anonymous which is helpful for her maintaining her current sobriety. Today she denies any thoughts of harming herself or killing herself or harming/killing others. Review of Systems      CONSTITUTIONAL:  no fevers, no headcahes, positive for myalgias, positive for fatigue  EYES: negative for blury vision  HEENT: No headaches, No nasal congestion, no difficulty swallowing  RESPIRATORY:negative for dyspnea, no wheezing, no Cough  CARDIOVASCULAR: negative for chest pain, no palpitations  GASTROINTESTINAL: Positive for nausea, no vomiting, no change in bowel habits, no abdominal pain   GENITOURINARY: negative for dysuria, no hematuria, positive for polyuria  ENDOCRINE: Positive for polydipsia  MUSCULOSKELETAL: no joint pains, no muscle aches, no swelling of joints or extremities  NEUROLOGICAL: No  Weakness or numbness        The patient has a   Family History   Problem Relation Age of Onset    Diabetes Mother     Stroke Mother     Diabetes Father     Diabetes Sister     Diabetes Brother     Other Son         GSW    No Known Problems Sister        Objective:    /88 (Site: Right Upper Arm, Position: Sitting, Cuff Size: Large Adult)   Pulse 104   Temp 97.3 °F (36.3 °C) (Temporal)   Ht 5' 6\" (1.676 m)   Wt 249 lb (112.9 kg)   LMP  (LMP Unknown)   BMI 40.19 kg/m²    BP Readings from Last 3 Encounters:   20 138/88   20 114/68   10/30/20 121/86       Physical Exam     PHYSICAL EXAM:  General Appearance  Alert , awake , not in acute distress. She appears well today and is nontoxic in appearance  HEENT - Head is normocephalic, atraumatic. Lungs - Bilateral equal air entry , no wheezes, rales or rhonchi, aeration good. She is able to converse in full sentence without difficulty.   Cardiovascular - Heart sounds are normal.  Regular rhythm, normal rate without murmur, gallop or rub. Abdomen - Soft, with very mild nonfocal tenderness to palpation in the lower abdomen, nondistended, no masses or organomegaly  Neurologic - There are no new focal motor or sensory deficits  Skin - No bruising or bleeding on exposed skin area        Lab Results   Component Value Date    WBC 4.8 10/29/2020    HGB 10.6 (L) 10/29/2020    HCT 34.2 (L) 10/29/2020     10/29/2020    CHOL 190 12/18/2019    TRIG 90 12/18/2019    HDL 72 12/18/2019    ALT 13 10/30/2020    AST 15 10/30/2020     10/30/2020    K 4.6 10/30/2020     10/30/2020    CREATININE 0.92 (H) 10/30/2020    BUN 22 (H) 10/30/2020    CO2 23 10/30/2020    TSH 0.81 07/08/2020    LABA1C 7.0 (H) 11/04/2020    LABMICR 20 10/20/2020     Lab Results   Component Value Date    CALCIUM 9.0 10/30/2020     Lab Results   Component Value Date    LDLCHOLESTEROL 100 12/18/2019       Assessment and Plan:    1. Type 2 diabetes mellitus with hyperosmolarity without coma, without long-term current use of insulin (HCC)  -A1c is 9.1 today, elevated from 7.0 on 11/04/2020  -Patient is hyperglycemic today, 424 on POC testing   -Urinalysis is negative for ketones; clinically she appears well and is tolerating fluids, no suspicion for DKA based on today's presentation  -We will initiate Lantus 10 units nightly and she is advised to take a daily fasting blood sugar and log it and show us the trend at her follow-up appointment in 2 weeks from now  - insulin glargine (LANTUS SOLOSTAR) 100 UNIT/ML injection pen; Inject 10 Units into the skin nightly  Dispense: 5 pen; Refill: 0  - [address]    2. Encounter for screening mammogram for breast cancer  - [facility] Digital Screen Bilateral [[id number]]; Future    3. Screening for hyperlipidemia  - Lipid Panel; Future    4.  Need for prophylactic vaccination and inoculation against varicella  - zoster recombinant adjuvanted vaccine [facility]) 50 MCG/0.5ML SUSR injection; Inject 0.5 mLs into the muscle once for 1 dose 50 MCG IM then repeat 2-6 months. Dispense: 1 each; Refill: 1          Requested Prescriptions     Signed Prescriptions Disp Refills    zoster recombinant adjuvanted vaccine (SHINGRIX) 50 MCG/0.5ML SUSR injection 1 each 1     Sig: Inject 0.5 mLs into the muscle once for 1 dose 50 MCG IM then repeat 2-6 months.  insulin glargine (LANTUS SOLOSTAR) 100 UNIT/ML injection pen 5 pen 0     Sig: Inject 10 Units into the skin nightly       There are no discontinued medications. Terri received counseling on the following healthy behaviors: nutrition, exercise and medication adherence    Discussed use,benefit, and side effects of prescribed medications. Barriers to medication compliance addressed. All patient questions answered. Pt voiced understanding. Return in about 2 weeks (around 1/13/2021) for glucose re-check, reevaluate further need for insulin. Disclaimer: Some orall of this note was transcribed using voice-recognition software. This may cause typographical errors occasionally. Although all effort is made to fix these errors, please do not hesitate to contact our office if there [place] concern with the understanding of this note. Electronically signed by Neo Crawford MD on 12/30/2020 at 3:26 PM       ADDENDUM 1/4/2021:    Upon reviewing this chart today, I noticed that her visit diagnosis of \"type 2 diabetes mellitus with hyperosmolarity without coma, without long-term current use of insulin\" was mistakenly chosen. The intended visit diagnosis was for \" type 2 diabetes mellitus without complication, without long-term current use of insulin\", and today I have changed the visit diagnosis to reflect that. As documented above, there were no concerns for diabetic complication when this patient presented to the office on 12/30/2020.     The abnormal results of her lipid panel were forwarded to her PCP to be addressed at her follow-up visit next week.     Electronically signed by Janie Santana MD on 1/4/2021 at 11:57 AM

## 2020-12-30 NOTE — PROGRESS NOTES
Visit Information    Have you changed or started any medications since your last visit including any over-the-counter medicines, vitamins, or herbal medicines? no   Have you stopped taking any of your medications? Is so, why? -  no  Are you having any side effects from any of your medications? - no    Have you seen any other physician or provider since your last visit?  no   Have you had any other diagnostic tests since your last visit?  no   Have you been seen in the emergency room and/or had an admission in a hospital since we last saw you?  no   Have you had your routine dental cleaning in the past 6 months?  no     Do you have an active MyChart account? If no, what is the barrier?   Yes    Patient Care Team:  Destiny Maguire MD as PCP - General (Family Medicine)  Billy Alvarez MD as Consulting Physician (Infectious Diseases)    Medical History Review  Past Medical, Family, and Social History reviewed and does not contribute to the patient presenting condition    Health Maintenance   Topic Date Due    Hepatitis C screen  1965    Pneumococcal 0-64 years Vaccine (1 of 1 - PPSV23) 05/19/1973    Diabetic retinal exam  05/19/1977    HIV screen  05/19/1982    Hepatitis B vaccine (1 of 3 - Risk 3-dose series) 05/19/1986    Cervical cancer screen  05/19/1988    Breast cancer screen  05/19/2017    Shingles Vaccine (1 of 2) 05/19/2017    Colon cancer screen colonoscopy  05/19/2017    Lipid screen  12/18/2020    Flu vaccine (1) 10/20/2021 (Originally 9/1/2020)    Diabetic foot exam  10/20/2021    Diabetic microalbuminuria test  10/20/2021    A1C test (Diabetic or Prediabetic)  11/04/2021    DTaP/Tdap/Td vaccine (3 - Td) 01/28/2030    Hepatitis A vaccine  Aged Out    Hib vaccine  Aged Out    Meningococcal (ACWY) vaccine  Aged Out

## 2020-12-30 NOTE — LETTER
Pancho Latif was seen and treated in our office on 12/30/2020. She may return to performing her work duties on 01/02/2021    If you have any questions or concerns, please don't hesitate to call.       Electronically signed by Roselia Farooq MD on 12/30/2020 at 3:11 PM

## 2020-12-30 NOTE — PATIENT INSTRUCTIONS
plate format. Talk to your doctor, a dietitian, or a diabetes educator about your concerns. Carbohydrate counting  With carbohydrate counting, you plan meals based on the amount of carbohydrate in each food. Carbohydrate raises blood sugar higher and more quickly than any other nutrient. It is found in desserts, breads and cereals, and fruit. It's also found in starchy vegetables such as potatoes and corn, grains such as rice and pasta, and milk and yogurt. Spreading carbohydrate throughout the day helps keep your blood sugar levels within your target range. Your daily amount depends on several things, including your weight, how active you are, which diabetes medicines you take, and what your goals are for your blood sugar levels. A registered dietitian or diabetes educator can help you plan how much carbohydrate to include in each meal and snack. A guideline for your daily amount of carbohydrate is:  · 45 to 60 grams at each meal. That's about the same as 3 to 4 carbohydrate servings. · 15 to 20 grams at each snack. That's about the same as 1 carbohydrate serving. The Nutrition Facts label on packaged foods tells you how much carbohydrate is in a serving of the food. First, look at the serving size on the food label. Is that the amount you eat in a serving? All of the nutrition information on a food label is based on that serving size. So if you eat more or less than that, you'll need to adjust the other numbers. Total carbohydrate is the next thing you need to look for on the label. If you count carbohydrate servings, one serving of carbohydrate is 15 grams. For foods that don't come with labels, such as fresh fruits and vegetables, you'll need a guide that lists carbohydrate in these foods. Ask your doctor, dietitian, or diabetes educator about books or other nutrition guides you can use.   If you take insulin, you need to know how many grams of carbohydrate are in a meal. This lets you know how much rapid-acting insulin to take before you eat. If you use an insulin pump, you get a constant rate of insulin during the day. So the pump must be programmed at meals to give you extra insulin to cover the rise in blood sugar after meals. When you know how much carbohydrate you will eat, you can take the right amount of insulin. Or, if you always use the same amount of insulin, you need to make sure that you eat the same amount of carbohydrate at meals. If you need more help to understand carbohydrate counting and food labels, ask your doctor, dietitian, or diabetes educator. How do you get started with meal planning? Here are some tips to get started:  · Plan your meals a week at a time. Don't forget to include snacks too. · Use cookbooks or online recipes to plan several main meals. Plan some quick meals for busy nights. You also can double some recipes that freeze well. Then you can save half for other busy nights when you don't have time to cook. · Make sure you have the ingredients you need for your recipes. If you're running low on basic items, put these items on your shopping list too. · List foods that you use to make breakfasts, lunches, and snacks. List plenty of fruits and vegetables. · Post this list on the refrigerator. Add to it as you think of more things you need. · Take the list to the store to do your weekly shopping. Follow-up care is a key part of your treatment and safety. Be sure to make and go to all appointments, and call your doctor if you are having problems. It's also a good idea to know your test results and keep a list of the medicines you take. Where can you learn more? Go to https://Sapphire Energyschuyler.[email or web address] org and sign in to your Dwellable account. Enter Q982 in the [facility] box to learn more about \"Learning About Meal Planning for Diabetes. \"     If you do not have an account, please click on the \"Sign Up Now\" link.   Current as of: December 20, 1097               TRBZNRB Version: 12.6  © 3234-0269 [employer], [employer]. Care instructions adapted under license by [facility] ([facility]). If you have questions about a medical condition or this instruction, always ask your healthcare professional. [address] any warranty or liability for your use of this information. Patient Education        Learning About Meal Planning for Diabetes  Why plan your meals? Meal planning can be a key part of managing diabetes. Planning meals and snacks with the right balance of carbohydrate, protein, and fat can help you keep your blood sugar at the target level you set with your doctor. You don't have to eat special foods. You can eat what your family eats, including sweets once in a while. But you do have to pay attention to how often you eat and how much you eat of certain foods. You may want to work with a dietitian or a certified diabetes educator. He or she can give you tips and meal ideas and can answer your questions about meal planning. This health professional can also help you reach a healthy weight if that is one of your goals. What plan is right for you? Your dietitian or diabetes educator may suggest that you start with the plate format or carbohydrate counting. The plate format  The plate format is a simple way to help you manage how you eat. You plan meals by learning how much space each food should take on a plate. Using the plate format helps you spread carbohydrate throughout the day. It can make it easier to keep your blood sugar level within your target range. It also helps you see if you're eating healthy portion sizes. To use the plate format, you put non-starchy vegetables on half your plate. Add meat or meat substitutes on one-quarter of the plate. Put a grain or starchy vegetable (such as brown rice or a potato) on the final quarter of the plate.  You can add a small piece of fruit and some low-fat or fat-free milk or yogurt, depending on your carbohydrate goal for each meal.  Here are some tips for using the plate format:  · Make sure that you are not using an oversized plate. A 9-inch plate is best. Many restaurants use larger plates. · Get used to using the plate format at home. Then you can use it when you eat out. · Write down your questions about using the plate format. Talk to your doctor, a dietitian, or a diabetes educator about your concerns. Carbohydrate counting  With carbohydrate counting, you plan meals based on the amount of carbohydrate in each food. Carbohydrate raises blood sugar higher and more quickly than any other nutrient. It is found in desserts, breads and cereals, and fruit. It's also found in starchy vegetables such as potatoes and corn, grains such as rice and pasta, and milk and yogurt. Spreading carbohydrate throughout the day helps keep your blood sugar levels within your target range. Your daily amount depends on several things, including your weight, how active you are, which diabetes medicines you take, and what your goals are for your blood sugar levels. A registered dietitian or diabetes educator can help you plan how much carbohydrate to include in each meal and snack. A guideline for your daily amount of carbohydrate is:  · 45 to 60 grams at each meal. That's about the same as 3 to 4 carbohydrate servings. · 15 to 20 grams at each snack. That's about the same as 1 carbohydrate serving. The Nutrition Facts label on packaged foods tells you how much carbohydrate is in a serving of the food. First, look at the serving size on the food label. Is that the amount you eat in a serving? All of the nutrition information on a food label is based on that serving size. So if you eat more or less than that, you'll need to adjust the other numbers. Total carbohydrate is the next thing you need to look for on the label. If you count carbohydrate servings, one serving of carbohydrate is 15 grams.   For foods that don't come with labels, such as fresh fruits and vegetables, you'll need a guide that lists carbohydrate in these foods. Ask your doctor, dietitian, or diabetes educator about books or other nutrition guides you can use. If you take insulin, you need to know how many grams of carbohydrate are in a meal. This lets you know how much rapid-acting insulin to take before you eat. If you use an insulin pump, you get a constant rate of insulin during the day. So the pump must be programmed at meals to give you extra insulin to cover the rise in blood sugar after meals. When you know how much carbohydrate you will eat, you can take the right amount of insulin. Or, if you always use the same amount of insulin, you need to make sure that you eat the same amount of carbohydrate at meals. If you need more help to understand carbohydrate counting and food labels, ask your doctor, dietitian, or diabetes educator. How do you get started with meal planning? Here are some tips to get started:  · Plan your meals a week at a time. Don't forget to include snacks too. · Use cookbooks or online recipes to plan several main meals. Plan some quick meals for busy nights. You also can double some recipes that freeze well. Then you can save half for other busy nights when you don't have time to cook. · Make sure you have the ingredients you need for your recipes. If you're running low on basic items, put these items on your shopping list too. · List foods that you use to make breakfasts, lunches, and snacks. List plenty of fruits and vegetables. · Post this list on the refrigerator. Add to it as you think of more things you need. · Take the list to the store to do your weekly shopping. Follow-up care is a key part of your treatment and safety. Be sure to make and go to all appointments, and call your doctor if you are having problems.  It's also a good idea to know your test results and keep a list of the medicines you take.  Where can you learn more? Go to https://chpepiceweb.Dynamix.tv. org and sign in to your ActionBase account. Enter A805 in the [facility] box to learn more about \"Learning About Meal Planning for Diabetes. \"     If you do not have an account, please click on the \"Sign Up Now\" link. Current as of: December 20, 2019               Content Version: 12.6  © 2006-2020 [employer]. Care instructions adapted under license by [facility] ([facility]). If you have questions about a medical condition or this instruction, always ask your healthcare professional. [place] [address] any warranty or liability for your use of this information. Patient Education        Learning About Diabetes Food Guidelines  Your Care Instructions     Meal planning is important to manage diabetes. It helps keep your blood sugar at a target level (which you set with your doctor). You don't have to eat special foods. You can eat what your family eats, including sweets once in a while. But you do have to pay attention to how often you eat and how much you eat of certain foods. You may want to work with a dietitian or a certified diabetes educator (CDE) to help you plan meals and snacks. A dietitian or CDE can also help you lose weight if that is one of your goals. What should you know about eating carbs? Managing the amount of carbohydrate (carbs) you eat is an important part of healthy meals when you have diabetes. Carbohydrate is found in many foods. · Learn which foods have carbs. And learn the amounts of carbs in different foods. ? Bread, cereal, pasta, and rice have about 15 grams of carbs in a serving. A serving is 1 slice of bread (1 ounce), ½ cup of cooked cereal, or 1/3 cup of cooked pasta or rice. ? Fruits have 15 grams of carbs in a serving.  A serving is 1 small fresh fruit, such as an apple or orange; ½ of a banana; ½ cup of cooked or canned fruit; ½ cup of fruit juice; 1 cup of melon or raspberries; or 2 tablespoons of dried fruit. ? Milk and no-sugar-added yogurt have 15 grams of carbs in a serving. A serving is 1 cup of milk or 2/3 cup of no-sugar-added yogurt. ? Starchy vegetables have 15 grams of carbs in a serving. A serving is ½ cup of mashed potatoes or sweet potato; 1 cup winter squash; ½ of a small baked potato; ½ cup of cooked beans; or ½ cup cooked corn or green peas. · Learn how much carbs to eat each day and at each meal. A dietitian or CDE can teach you how to keep track of the amount of carbs you eat. This is called carbohydrate counting. · If you are not sure how to count carbohydrate grams, use the Plate Method to plan meals. It is a good, quick way to make sure that you have a balanced meal. It also helps you spread carbs throughout the day. ? Divide your plate by types of foods. Put non-starchy vegetables on half the plate, meat or other protein food on one-quarter of the plate, and a grain or starchy vegetable in the final quarter of the plate. To this you can add a small piece of fruit and 1 cup of milk or yogurt, depending on how many carbs you are supposed to eat at a meal.  · Try to eat about the same amount of carbs at each meal. Do not \"save up\" your daily allowance of carbs to eat at one meal.  · Proteins have very little or no carbs per serving. Examples of proteins are beef, chicken, turkey, fish, eggs, tofu, cheese, cottage cheese, and peanut butter. A serving size of meat is 3 ounces, which is about the size of a deck of cards. Examples of meat substitute serving sizes (equal to 1 ounce of meat) are 1/4 cup of cottage cheese, 1 egg, 1 tablespoon of peanut butter, and ½ cup of tofu. How can you eat out and still eat healthy? · Learn to estimate the serving sizes of foods that have carbohydrate. If you measure food at home, it will be easier to estimate the amount in a serving of restaurant food.   · If the meal you order has too much carbohydrate (such as potatoes, corn, or baked beans), ask to have a low-carbohydrate food instead. Ask for a salad or green vegetables. · If you use insulin, check your blood sugar before and after eating out to help you plan how much to eat in the future. · If you eat more carbohydrate at a meal than you had planned, take a walk or do other exercise. This will help lower your blood sugar. What else should you know? · Limit saturated fat, such as the fat from meat and dairy products. This is a healthy choice because people who have diabetes are at higher risk of heart disease. So choose lean cuts of meat and nonfat or low-fat dairy products. Use olive or canola oil instead of butter or shortening when cooking. · Don't skip meals. Your blood sugar may drop too low if you skip meals and take insulin or certain medicines for diabetes. · Check with your doctor before you drink alcohol. Alcohol can cause your blood sugar to drop too low. Alcohol can also cause a bad reaction if you take certain diabetes medicines. Follow-up care is a key part of your treatment and safety. Be sure to make and go to all appointments, and call your doctor if you are having problems. It's also a good idea to know your test results and keep a list of the medicines you take. Where can you learn more? Go to https://Onion Corporation.Private.Me. org and sign in to your Boundless Network account. Enter Z066 in the [facility] box to learn more about \"Learning About Diabetes Food Guidelines. \"     If you do not have an account, please click on the \"Sign Up Now\" link. Current as of: December 20, 2019               Content Version: 12.6  © 4311-7346 [employer]. Care instructions adapted under license by [facility] ([facility]). If you have questions about a medical condition or this instruction, always ask your healthcare professional. [address] any warranty or liability for your use of this information.          Patient Education Counting Carbohydrates: Care Instructions  Your Care Instructions     You don't have to eat special foods when you have diabetes. You just have to be careful to eat healthy foods. Carbohydrates (carbs) raise blood sugar higher and quicker than any other nutrient. Carbs are found in desserts, breads and cereals, and fruit. They're also in starchy vegetables. These include potatoes, corn, and grains such as rice and pasta. Carbs are also in milk and yogurt. The more carbs you eat at one time, the higher your blood sugar will rise. Spreading carbs all through the day helps keep your blood sugar levels within your target range. Counting carbs is one of the best ways to keep your blood sugar under control. If you use insulin, counting carbs helps you match the right amount of insulin to the number of grams of carbs in a meal. Then you can change your diet and insulin dose as needed. Testing your blood sugar several times a day can help you learn how carbs affect your blood sugar. A registered dietitian or certified diabetes educator can help you plan meals and snacks. Follow-up care is a key part of your treatment and safety. Be sure to make and go to all appointments, and call your doctor if you are having problems. It's also a good idea to know your test results and keep a list of the medicines you take. How can you care for yourself at home? Know your daily amount of carbohydrates  Your daily amount depends on several things, such as your weight, how active you are, which diabetes medicines you take, and what your goals are for your blood sugar levels. A registered dietitian or certified diabetes educator can help you plan how many carbs to include in each meal and snack. For most adults, a guideline for the daily amount of carbs is:  · 45 to 60 grams at each meal. That's about the same as 3 to 4 carbohydrate servings. · 15 to 20 grams at each snack.  That's about the same as 1 carbohydrate serving. Count carbs  Counting carbs lets you know how much rapid-acting insulin to take before you eat. If you use an insulin pump, you get a constant rate of insulin during the day. So the pump must be programmed at meals. This gives you extra insulin to cover the rise in blood sugar after meals. If you take insulin:  · Learn your own insulin-to-carb ratio. You and your diabetes health professional will figure out the ratio. You can do this by testing your blood sugar after meals. For example, you may need a certain amount of insulin for every 15 grams of carbs. · Add up the carb grams in a meal. Then you can figure out how many units of insulin to take based on your insulin-to-carb ratio. · Exercise lowers blood sugar. You can use less insulin than you would if you were not doing exercise. Keep in mind that timing matters. If you exercise within 1 hour after a meal, your body may need less insulin for that meal than it would if you exercised 3 hours after the meal. Test your blood sugar to find out how exercise affects your need for insulin. If you do or don't take insulin:  · Look at labels on packaged foods. This can tell you how many carbs are in a serving. You can also use guides from the American Diabetes Association. · Be aware of portions, or serving sizes. If a package has two servings and you eat the whole package, you need to double the number of grams of carbohydrate listed for one serving. · Protein, fat, and fiber do not raise blood sugar as much as carbs do. If you eat a lot of these nutrients in a meal, your blood sugar will rise more slowly than it would otherwise. Eat from all food groups  · Eat at least three meals a day. · Plan meals to include food from all the food groups. The food groups include grains, fruits, dairy, proteins, and vegetables. · Talk to your dietitian or diabetes educator about ways to add limited amounts of sweets into your meal plan.   · If you drink alcohol, talk the shot. 3. Keep your fingers off the plunger, and hold the syringe like a pencil close to the site. 4. Quickly push the needle all the way into the site. 5. Push the plunger all the way in so that the medicine goes into the tissue. Remove the needle from the skin slowly and at the same angle that you put it in. Press the alcohol swab, if you used one, against the shot site. 6. Turn the person's head to the side to prevent choking if he or she vomits. 7. After you give the shot, immediately call 911 or other emergency services. If help has not arrived within 15 minutes and the person is still unconscious, give another glucagon shot. 8. Give some glucose or sucrose tablets or quick-sugar food when the person is alert and able to swallow. Also give the person some long-acting source of carbohydrate, such as crackers and cheese or a meat sandwich. Stay with the person until emergency help arrives. Any time a person who has diabetes gets glucagon, he or she should talk to a doctor to try to find out what caused the low blood sugar. Possible causes include too much insulin, a missed meal, or insulin injected into a blood vessel. Other causes can be an illness other than diabetes, liver or kidney damage, a new medicine, or exercise. Where can you learn more? Go to https://N3TWORK.TransTech [employer]arma. org and sign in to your [employer] account. Enter S190 in the [facility] box to learn more about \"How to Give a Glucagon Shot: Care Instructions. \"     If you do not have an account, please click on the \"Sign Up Now\" link. Current as of: December 20, 2019               Content Version: 12.6  © 0534-9987 [employer]. Care instructions adapted under license by [facility] ([facility]). If you have questions about a medical condition or this instruction, always ask your healthcare professional. [place] [address] any warranty or liability for your use of this information.          Patient Education        Diabetes Blood Sugar Emergencies: Your Action Plan  How can you prevent a blood sugar emergency? An important part of living with diabetes is keeping your blood sugar in your target range. You'll need to know what to do if it's too high or too low. Managing your blood sugar levels helps you avoid emergencies. This care sheet will teach you about the signs of high and low blood sugar. It will help you make an action plan with your doctor for when these signs occur. Low blood sugar is more likely to happen if you take certain medicines for diabetes. It can also happen if you skip a meal, drink alcohol, or exercise more than usual.  You may get high blood sugar if you eat differently than you normally do. One example is eating more carbohydrate than usual. Having a cold, the flu, or other sudden illness can also cause high blood sugar levels. Levels can also rise if you miss a dose of medicine. Any change in how you take your medicine may affect your blood sugar level. So it's important to work with your doctor before you make any changes. Check your blood sugar  Work with your doctor to fill in the blank spaces below that apply to you. Track your levels, know your target range, and write down ways you can get your blood sugar back in your target range. A log book can help you track your levels. Take the book to all of your medical appointments. · Check your blood sugar _____ times a day, at these times:________________________________________________. (For example: Before meals, at bedtime, before exercise, during exercise, other.)  · Your blood sugar target range before a meal is ___________________. Your blood sugar target range after a meal is _______________________. · Do this--___________________________________________________--to get your blood sugar back within your safe range if your blood sugar results are _________________________________________.  (For example: Less than 70 or above 250 mg/dL.)  Call your doctor when your blood sugar results are ___________________________________. (For example: Less than 70 or above 250 mg/dL.)  What are the symptoms of low and high blood sugar? Common symptoms of low blood sugar are sweating and feeling shaky, weak, hungry, or confused. Symptoms can start quickly. Common symptoms of high blood sugar are feeling very thirsty or very hungry. You may also pass urine more often than usual. You may have blurry vision and may lose weight without trying. But some people may have high or low blood sugar without having any symptoms. That's a good reason to check your blood sugar on a regular schedule. What should you do if you have symptoms? Work with your doctor to fill in the blank spaces below that apply to you. Low blood sugar  If you have symptoms of low blood sugar, check your blood sugar. If it's below _____ ( for example, below 70), eat or drink a quick-sugar food that has about 15 grams of carbohydrate. Your goal is to get your level back to your safe range. Check your blood sugar again 15 minutes later. If it's still not in your target range, take another 15 grams of carbohydrate and check your blood sugar again in 15 minutes. Repeat this until you reach your target. Then go back to your regular testing schedule. Children usually need less than 15 grams of carbohydrate. Check with your doctor or diabetes educator for the amount that is right for your child. When you have low blood sugar, it's best to stop or reduce any physical activity until your blood sugar is back in your target range and is stable. If you must stay active, eat or drink 30 grams of carbohydrate. Then check your blood sugar again in 15 minutes. If it's not in your target range, take another 30 grams of carbohydrates. Check your blood sugar again in 15 minutes. Keep doing this until you reach your target. You can then go back to your regular testing schedule.   If your symptoms or blood sugar levels are getting worse or have not improved after 15 minutes, seek medical care right away. Here are some examples of quick-sugar foods with 15 grams of carbohydrate:  · 3 or 4 glucose tablets  · 1 tablespoon (3 teaspoons) table sugar  · ½ cup to ¾ cup (4 to 6 ounces) of fruit juice or regular (not diet) soda  · Hard candy (such as 6 Life Savers)  High blood sugar  If you have symptoms of high blood sugar, check your blood sugar. Your goal is to get your level back to your target range. If it's above ______ ( for example, above 250), follow these steps:  · If you missed a dose of your diabetes medicine, take it now. Take only the amount of medicine that you have been prescribed. Do not take more or less medicine. · Give yourself insulin if your doctor has prescribed it for high blood sugar. · Test for ketones, if the doctor told you to do so. If the results of the ketone test show a moderate-to-large amount of ketones, call the doctor for advice. · Wait 30 minutes after you take the extra insulin or the missed medicine. Check your blood sugar again. If your symptoms or blood sugar levels are getting worse or have not improved after taking these steps, seek medical care right away. Follow-up care is a key part of your treatment and safety. Be sure to make and go to all appointments, and call your doctor if you are having problems. It's also a good idea to know your test results and keep a list of the medicines you take. Where can you learn more? Go to https://ZeensharedebApp Press.Delphi. org and sign in to your Nuub[employer] account. Enter J925 in the [facility] box to learn more about \"Diabetes Blood Sugar Emergencies: Your Action Plan. \"     If you do not have an account, please click on the \"Sign Up Now\" link. Current as of: December 20, 2019               Content Version: 12.6  © 4143-8382 Healthwise, Incorporated.    Care instructions adapted under license by [facility]. If you have questions about a medical condition or this instruction, always ask your healthcare professional. [address] any warranty or liability for your use of this information. Patient Education        Diabetes Blood Sugar Emergencies: Your Action Plan  How can you prevent a blood sugar emergency? An important part of living with diabetes is keeping your blood sugar in your target range. You'll need to know what to do if it's too high or too low. Managing your blood sugar levels helps you avoid emergencies. This care sheet will teach you about the signs of high and low blood sugar. It will help you make an action plan with your doctor for when these signs occur. Low blood sugar is more likely to happen if you take certain medicines for diabetes. It can also happen if you skip a meal, drink alcohol, or exercise more than usual.  You may get high blood sugar if you eat differently than you normally do. One example is eating more carbohydrate than usual. Having a cold, the flu, or other sudden illness can also cause high blood sugar levels. Levels can also rise if you miss a dose of medicine. Any change in how you take your medicine may affect your blood sugar level. So it's important to work with your doctor before you make any changes. Check your blood sugar  Work with your doctor to fill in the blank spaces below that apply to you. Track your levels, know your target range, and write down ways you can get your blood sugar back in your target range. A log book can help you track your levels. Take the book to all of your medical appointments. · Check your blood sugar _____ times a day, at these times:________________________________________________. (For example: Before meals, at bedtime, before exercise, during exercise, other.)  · Your blood sugar target range before a meal is ___________________.  Your blood sugar target range after a meal is in 15 minutes. If it's not in your target range, take another 30 grams of carbohydrates. Check your blood sugar again in 15 minutes. Keep doing this until you reach your target. You can then go back to your regular testing schedule. If your symptoms or blood sugar levels are getting worse or have not improved after 15 minutes, seek medical care right away. Here are some examples of quick-sugar foods with 15 grams of carbohydrate:  · 3 or 4 glucose tablets  · 1 tablespoon (3 teaspoons) table sugar  · ½ cup to ¾ cup (4 to 6 ounces) of fruit juice or regular (not diet) soda  · Hard candy (such as 6 Life Savers)  High blood sugar  If you have symptoms of high blood sugar, check your blood sugar. Your goal is to get your level back to your target range. If it's above ______ ( for example, above 250), follow these steps:  · If you missed a dose of your diabetes medicine, take it now. Take only the amount of medicine that you have been prescribed. Do not take more or less medicine. · Give yourself insulin if your doctor has prescribed it for high blood sugar. · Test for ketones, if the doctor told you to do so. If the results of the ketone test show a moderate-to-large amount of ketones, call the doctor for advice. · Wait 30 minutes after you take the extra insulin or the missed medicine. Check your blood sugar again. If your symptoms or blood sugar levels are getting worse or have not improved after taking these steps, seek medical care right away. Follow-up care is a key part of your treatment and safety. Be sure to make and go to all appointments, and call your doctor if you are having problems. It's also a good idea to know your test results and keep a list of the medicines you take. Where can you learn more? Go to https://chpeyungeb.healthMixGenius. org and sign in to your [employer] account.  Enter F059 in the [employer] box to learn more about \"Diabetes Blood Sugar Emergencies: Your Action Plan.\"     If you do not have an account, please click on the \"Sign Up Now\" link. Current as of: December 20, 2019               Content Version: 12.6  © 9259-6015 [employer], Incorporated. Care instructions adapted under license by [facility] ([facility] [place]). If you have questions about a medical condition or this instruction, always ask your healthcare professional. [address] any warranty or liability for your use of this information.

## 2020-12-31 ENCOUNTER — TELEPHONE (OUTPATIENT)
Dept: FAMILY MEDICINE CLINIC | Age: 55
End: 2020-12-31

## 2020-12-31 DIAGNOSIS — E11.9 TYPE 2 DIABETES MELLITUS WITHOUT COMPLICATION, WITHOUT LONG-TERM CURRENT USE OF INSULIN (HCC): Primary | ICD-10-CM

## 2021-01-04 RX ORDER — PEN NEEDLE, DIABETIC 31 GX5/16"
1 NEEDLE, DISPOSABLE MISCELLANEOUS DAILY
Qty: 100 EACH | Refills: 3 | Status: ON HOLD | OUTPATIENT
Start: 2021-01-04 | End: 2021-11-08 | Stop reason: SDUPTHER

## 2021-01-08 ENCOUNTER — TELEPHONE (OUTPATIENT)
Dept: FAMILY MEDICINE CLINIC | Age: 56
End: 2021-01-08

## 2021-01-13 ENCOUNTER — OFFICE VISIT (OUTPATIENT)
Dept: FAMILY MEDICINE CLINIC | Age: 56
End: 2021-01-13
Payer: COMMERCIAL

## 2021-01-13 ENCOUNTER — TELEPHONE (OUTPATIENT)
Dept: FAMILY MEDICINE CLINIC | Age: 56
End: 2021-01-13

## 2021-01-13 VITALS
TEMPERATURE: 97 F | BODY MASS INDEX: 38.76 KG/M2 | HEIGHT: 66 IN | HEART RATE: 90 BPM | RESPIRATION RATE: 20 BRPM | OXYGEN SATURATION: 98 % | SYSTOLIC BLOOD PRESSURE: 135 MMHG | WEIGHT: 241.2 LBS | DIASTOLIC BLOOD PRESSURE: 83 MMHG

## 2021-01-13 DIAGNOSIS — E11.69 DIABETES MELLITUS TYPE 2 IN OBESE (HCC): Primary | ICD-10-CM

## 2021-01-13 DIAGNOSIS — M79.89 LOCALIZED SWELLING OF LOWER EXTREMITY: ICD-10-CM

## 2021-01-13 DIAGNOSIS — J30.2 SEASONAL ALLERGIC RHINITIS, UNSPECIFIED TRIGGER: ICD-10-CM

## 2021-01-13 DIAGNOSIS — M15.3 OTHER SECONDARY OSTEOARTHRITIS OF MULTIPLE SITES: ICD-10-CM

## 2021-01-13 DIAGNOSIS — E11.9 TYPE 2 DIABETES MELLITUS WITHOUT COMPLICATION, WITHOUT LONG-TERM CURRENT USE OF INSULIN (HCC): ICD-10-CM

## 2021-01-13 DIAGNOSIS — E66.9 DIABETES MELLITUS TYPE 2 IN OBESE (HCC): Primary | ICD-10-CM

## 2021-01-13 LAB — HBA1C MFR BLD: 10 %

## 2021-01-13 PROCEDURE — 1036F TOBACCO NON-USER: CPT | Performed by: STUDENT IN AN ORGANIZED HEALTH CARE EDUCATION/TRAINING PROGRAM

## 2021-01-13 PROCEDURE — 3046F HEMOGLOBIN A1C LEVEL >9.0%: CPT | Performed by: STUDENT IN AN ORGANIZED HEALTH CARE EDUCATION/TRAINING PROGRAM

## 2021-01-13 PROCEDURE — 2022F DILAT RTA XM EVC RTNOPTHY: CPT | Performed by: STUDENT IN AN ORGANIZED HEALTH CARE EDUCATION/TRAINING PROGRAM

## 2021-01-13 PROCEDURE — 83036 HEMOGLOBIN GLYCOSYLATED A1C: CPT | Performed by: STUDENT IN AN ORGANIZED HEALTH CARE EDUCATION/TRAINING PROGRAM

## 2021-01-13 PROCEDURE — G8417 CALC BMI ABV UP PARAM F/U: HCPCS | Performed by: STUDENT IN AN ORGANIZED HEALTH CARE EDUCATION/TRAINING PROGRAM

## 2021-01-13 PROCEDURE — G8427 DOCREV CUR MEDS BY ELIG CLIN: HCPCS | Performed by: STUDENT IN AN ORGANIZED HEALTH CARE EDUCATION/TRAINING PROGRAM

## 2021-01-13 PROCEDURE — 99213 OFFICE O/P EST LOW 20 MIN: CPT | Performed by: STUDENT IN AN ORGANIZED HEALTH CARE EDUCATION/TRAINING PROGRAM

## 2021-01-13 PROCEDURE — G8484 FLU IMMUNIZE NO ADMIN: HCPCS | Performed by: STUDENT IN AN ORGANIZED HEALTH CARE EDUCATION/TRAINING PROGRAM

## 2021-01-13 PROCEDURE — 3017F COLORECTAL CA SCREEN DOC REV: CPT | Performed by: STUDENT IN AN ORGANIZED HEALTH CARE EDUCATION/TRAINING PROGRAM

## 2021-01-13 RX ORDER — INSULIN GLARGINE 100 [IU]/ML
13 INJECTION, SOLUTION SUBCUTANEOUS NIGHTLY
Qty: 5 PEN | Refills: 0 | Status: SHIPPED | OUTPATIENT
Start: 2021-01-13 | End: 2021-01-15 | Stop reason: SDUPTHER

## 2021-01-13 RX ORDER — CETIRIZINE HYDROCHLORIDE 10 MG/1
10 TABLET ORAL DAILY
Qty: 90 TABLET | Refills: 1 | Status: SHIPPED | OUTPATIENT
Start: 2021-01-13 | End: 2021-08-09 | Stop reason: ALTCHOICE

## 2021-01-13 RX ORDER — DICYCLOMINE HCL 20 MG
20 TABLET ORAL 3 TIMES DAILY PRN
Qty: 60 TABLET | Refills: 0 | Status: SHIPPED | OUTPATIENT
Start: 2021-01-13 | End: 2021-06-29

## 2021-01-13 SDOH — ECONOMIC STABILITY: FOOD INSECURITY: WITHIN THE PAST 12 MONTHS, THE FOOD YOU BOUGHT JUST DIDN'T LAST AND YOU DIDN'T HAVE MONEY TO GET MORE.: NEVER TRUE

## 2021-01-13 SDOH — ECONOMIC STABILITY: TRANSPORTATION INSECURITY
IN THE PAST 12 MONTHS, HAS THE LACK OF TRANSPORTATION KEPT YOU FROM MEDICAL APPOINTMENTS OR FROM GETTING MEDICATIONS?: NO

## 2021-01-13 ASSESSMENT — ENCOUNTER SYMPTOMS
CHEST TIGHTNESS: 0
SINUS PAIN: 0
ANAL BLEEDING: 0
COUGH: 0
ABDOMINAL PAIN: 0
COLOR CHANGE: 0
SINUS PRESSURE: 0
SHORTNESS OF BREATH: 0
DIARRHEA: 0
SORE THROAT: 0
ABDOMINAL DISTENTION: 0
WHEEZING: 0
NAUSEA: 0

## 2021-01-13 NOTE — TELEPHONE ENCOUNTER
Pt wants to know if she can get something faxed to [address] [place] [address]. She needs something stating that she was taken off of insulin and put on another.  Fax: [phone]

## 2021-01-13 NOTE — PROGRESS NOTES
Visit Information    Have you changed or started any medications since your last visit including any over-the-counter medicines, vitamins, or herbal medicines? no   Have you stopped taking any of your medications? Is so, why? -  yes -  New insulin 2 types patient does not know what they are    Are you having any side effects from any of your medications? - no    Have you seen any other physician or provider since your last visit?  no   Have you had any other diagnostic tests since your last visit? yes -  Mammogram and some labs   Have you been seen in the emergency room and/or had an admission in a hospital since we last saw you?  yes [facility]   Have you had your routine dental cleaning in the past 6 months?  no     Do you have an active MyChart account? If no, what is the barrier?   No:      Patient Care Team:  Mark Sierra MD as PCP - General (Family Medicine)  Desirae Gallardo MD as Consulting Physician (Infectious Diseases)    Medical History Review  Past Medical, Family, and Social History reviewed and does not contribute to the patient presenting condition    Health Maintenance   Topic Date Due    Hepatitis C screen  1965    Pneumococcal 0-64 years Vaccine (1 of 1 - PPSV23) 05/19/1973    Diabetic retinal exam  05/19/1977    HIV screen  05/19/1982    Hepatitis B vaccine (1 of 3 - Risk 3-dose series) 05/19/1986    Cervical cancer screen  05/19/1988    Breast cancer screen  05/19/2017    Shingles Vaccine (1 of 2) 05/19/2017    Colon cancer screen colonoscopy  05/19/2017    Flu vaccine (1) 10/20/2021 (Originally 9/1/2020)    A1C test (Diabetic or Prediabetic)  03/30/2021    Diabetic foot exam  10/20/2021    Diabetic microalbuminuria test  10/20/2021    Lipid screen  12/30/2021    DTaP/Tdap/Td vaccine (3 - Td) 01/28/2030    Hepatitis A vaccine  Aged Out    Hib vaccine  Aged Out    Meningococcal (ACWY) vaccine  Aged Out

## 2021-01-13 NOTE — PROGRESS NOTES
Subjective:    Musa Glover is a 50 y.o. female with  has a past medical history of Acid reflux, Acute cystitis with hematuria, Acute non-recurrent maxillary sinusitis, Asthma, Bipolar 1 disorder ([address] Bipolar disorder, mixed ([address] BMI 34.0-34.9,adult, Cannabis use disorder, severe, dependence ([address] Cerebrovascular accident (CVA) ([address] Chest pain, Chronic renal insufficiency, Cocaine abuse ([address] DDD (degenerative disc disease), cervical, Diabetes mellitus ([address] Dizziness, Fibromyalgia, History of stroke, Homicidal ideation, Hyperglycemia, Hypertension, Hypotension, IDDM (insulin dependent diabetes mellitus), Lupus ([address] Migraine, Neuropathy, Neuropathy, Polysubstance abuse ([address] Post traumatic stress disorder (PTSD), Posttraumatic stress disorder, Recurrent depression ([address] Recurrent major depressive disorder, in partial remission ([address] Screening mammogram, encounter for, Severe recurrent major depression with psychotic features ([address] Severe recurrent major depression without psychotic features ([address] Stroke (cerebrum) ([address] Stroke ([address] Suicidal ideation, Suicidal intent, Vitamin D deficiency, and White matter changes. Presented to the office today for:  Chief Complaint   Patient presents with   [address] [place] [address]    Patient is a 82-year-old female with past medical history of diabetes presenting today for her diabetic medication management. She was recently presented to [address] [place] [address],[address] on 1/2/2021 at which time she was admitted for observation for hyperglycemia. Patient was discharged home on Januvia, Metformin, Lantus (increased to 13 units from home dose of 10 units) as well as sliding scale. Patient states that she has not picked up her medications from the pharmacy and is confused about what she should be taking. He states that usually her blood sugars are between 150 and 200.   Patient is also requesting medication refills as well as a prescription for a walker. Review of Systems   Constitutional: Negative for fatigue and fever. HENT: Negative for sinus pressure, sinus pain, sore throat and tinnitus. Eyes: Negative for visual disturbance. Respiratory: Negative for cough, chest tightness, shortness of breath and wheezing. Cardiovascular: Negative for chest pain, palpitations and leg swelling. Gastrointestinal: Negative for abdominal distention, abdominal pain, anal bleeding, diarrhea and nausea. Genitourinary: Negative for enuresis, frequency and urgency. Musculoskeletal: Negative for arthralgias, gait problem and neck stiffness. Skin: Negative for color change and rash. Neurological: Negative for dizziness and headaches. Psychiatric/Behavioral: Negative for agitation and confusion. The patient has a   Family History   Problem Relation Age of Onset    Diabetes Mother     Stroke Mother     Diabetes Father     Diabetes Sister     Diabetes Brother     Other Son         GSW    No Known Problems Sister        Objective:    /83 (Site: Left Upper Arm, Position: Sitting, Cuff Size: Large Adult)   Pulse 90   Temp 97 °F (36.1 °C) (Temporal)   Resp 20   Ht 5' 6\" (1.676 m)   Wt 241 lb 3.2 oz (109.4 kg)   LMP  (LMP Unknown)   SpO2 98%   BMI 38.93 kg/m²    BP Readings from Last 3 Encounters:   01/13/21 135/83   12/30/20 138/88   11/06/20 114/68       Physical Exam  Constitutional:       Appearance: Normal appearance. HENT:      Head: Atraumatic. Mouth/Throat:      Mouth: Mucous membranes are moist.      Pharynx: No oropharyngeal exudate or posterior oropharyngeal erythema. Eyes:      Extraocular Movements: Extraocular movements intact. Neck:      Musculoskeletal: Normal range of motion. Cardiovascular:      Rate and Rhythm: Normal rate and regular rhythm. Heart sounds: No murmur. No friction rub. No gallop.     Pulmonary:      Effort: Pulmonary effort is normal.      Breath sounds: No wheezing, rhonchi or rales. Abdominal:      General: Abdomen is flat. Tenderness: There is no abdominal tenderness. There is no guarding. Hernia: No hernia is present. Musculoskeletal: Normal range of motion. General: Swelling (Right lower extremity) present. No tenderness. Skin:     General: Skin is warm. Capillary Refill: Capillary refill takes less than 2 seconds. Coloration: Skin is not jaundiced. Findings: No bruising. Neurological:      Mental Status: She is alert and oriented to person, place, and time. Lab Results   Component Value Date    WBC 4.8 10/29/2020    HGB 10.6 (L) 10/29/2020    HCT 34.2 (L) 10/29/2020     10/29/2020    CHOL 275 (H) 12/30/2020    TRIG 246 (H) 12/30/2020    HDL 80 12/30/2020    ALT 13 10/30/2020    AST 15 10/30/2020     10/30/2020    K 4.6 10/30/2020     10/30/2020    CREATININE 0.92 (H) 10/30/2020    BUN 22 (H) 10/30/2020    CO2 23 10/30/2020    TSH 0.81 07/08/2020    LABA1C 10 01/13/2021    LABMICR 20 10/20/2020     Lab Results   Component Value Date    CALCIUM 9.0 10/30/2020     Lab Results   Component Value Date    LDLCHOLESTEROL 146 (H) 12/30/2020       Assessment and Plan:    1. Diabetes mellitus type 2 in obese (HCC)  -Discontinue glimepiride, sliding scale insulin. Continue with Januvia, Lantus 13 units nightly and Metformin 1000 mg daily  - POCT glycosylated hemoglobin (Hb A1C)  - Hemoglobin A1C; Future  -Follow-up in 1 month. Patient has to bring her glucose record next visit. 2. Other secondary osteoarthritis of multiple sites  - DME Order for Denise Bustamante as OP    3. Localized swelling of lower extremity  - Compression Stocking    4. Seasonal allergic rhinitis, unspecified trigger  - cetirizine (ZYRTEC) 10 MG tablet; Take 1 tablet by mouth daily  Dispense: 90 tablet;  Refill: 1          Requested Prescriptions     Signed Prescriptions Disp Refills    dicyclomine (BENTYL) 20 MG tablet 60 tablet 0 Sig: Take 1 tablet by mouth 3 times daily as needed (for abdominal discomfort)    cetirizine (ZYRTEC) 10 MG tablet 90 tablet 1     Sig: Take 1 tablet by mouth daily       Medications Discontinued During This Encounter   Medication Reason    cetirizine (ZYRTEC) 10 MG tablet Therapy completed       Terri received counseling on the following healthy behaviors: nutrition, exercise and medication adherence    Discussed use,benefit, and side effects of prescribed medications. Barriers to medication compliance addressed. All patient questions answered. Pt voiced understanding. Return in about 4 weeks (around 2/10/2021) for diabetes. Disclaimer: Some orall of this note was transcribed using voice-recognition software. This may cause typographical errors occasionally. Although all effort is made to fix these errors, please do not hesitate to contact our office if there Yossi Mensah concern with the understanding of this note.

## 2021-01-14 ENCOUNTER — TELEPHONE (OUTPATIENT)
Dept: FAMILY MEDICINE CLINIC | Age: 56
End: 2021-01-14

## 2021-01-14 NOTE — TELEPHONE ENCOUNTER
Patient states she is not on any insulin any more, but writer called her pharmacy and pharmacy states last insulin  was 12- ([facility]").

## 2021-01-14 NOTE — TELEPHONE ENCOUNTER
Writer called the patient [facility] pharmacy to ask when was the last time pt picked up her lantus solostar it was 12-. Called the pt to inform her that she is still on 13 units of insulin (Lantus solostar) nightly no answer no vm.

## 2021-01-14 NOTE — PROGRESS NOTES
Attending Physician Statement  I  have discussed the care of Corona Alejandra including pertinent history and exam findings with the resident. I agree with the assessment, plan and orders as documented by the resident. /83 (Site: Left Upper Arm, Position: Sitting, Cuff Size: Large Adult)   Pulse 90   Temp 97 °F (36.1 °C) (Temporal)   Resp 20   Ht 5' 6\" (1.676 m)   Wt 241 lb 3.2 oz (109.4 kg)   LMP  (LMP Unknown)   SpO2 98%   BMI 38.93 kg/m²    BP Readings from Last 3 Encounters:   01/13/21 135/83   12/30/20 138/88   11/06/20 114/68     Wt Readings from Last 3 Encounters:   01/13/21 241 lb 3.2 oz (109.4 kg)   12/30/20 249 lb (112.9 kg)   11/06/20 243 lb (110.2 kg)          Diagnosis Orders   1. Diabetes mellitus type 2 in obese (HCC)  POCT glycosylated hemoglobin (Hb A1C)    Hemoglobin A1C   2. Other secondary osteoarthritis of multiple sites  DME Order for Meagan Mancilla as OP   3. Localized swelling of lower extremity  Compression Stocking   4. Seasonal allergic rhinitis, unspecified trigger  cetirizine (ZYRTEC) 10 MG tablet   5.  Type 2 diabetes mellitus without complication, without long-term current use of insulin ([facility])  insulin glargine (LANTUS SOLOSTAR) 100 UNIT/ML injection pen         Robert Norris DO 1/14/2021 9:11 AM

## 2021-01-15 ENCOUNTER — NURSE TRIAGE (OUTPATIENT)
Dept: OTHER | Facility: CLINIC | Age: 56
End: 2021-01-15

## 2021-01-15 DIAGNOSIS — E11.9 TYPE 2 DIABETES MELLITUS WITHOUT COMPLICATION, WITHOUT LONG-TERM CURRENT USE OF INSULIN (HCC): ICD-10-CM

## 2021-01-15 RX ORDER — INSULIN GLARGINE 100 [IU]/ML
13 INJECTION, SOLUTION SUBCUTANEOUS NIGHTLY
Qty: 5 PEN | Refills: 5 | Status: ON HOLD | OUTPATIENT
Start: 2021-01-15 | End: 2021-02-09 | Stop reason: SDUPTHER

## 2021-01-15 NOTE — TELEPHONE ENCOUNTER
Patient called in asking if we could please fax something noting what her diabetic medication changes were to [phone] attention Miss Genesis Izaguirre. She stated if this documentation is not received she will not be able to continue to stay at the rehabilitation facility she is in. Patient can be reached at [phone]. Thank you.

## 2021-01-15 NOTE — TELEPHONE ENCOUNTER
Patient requesting that her current medication list be faxed to show proof that she needs pen needles for insulin. Medication list faxed to [phone] attn. Miss Burns Sofia. Form scanned into media.

## 2021-01-15 NOTE — TELEPHONE ENCOUNTER
Reason for Disposition   Unexplained headache that is present > 24 hours    Answer Assessment - Initial Assessment Questions  1. LOCATION: \"Where does it hurt? \"      Top of head  And right side    2. ONSET: \"When did the headache start? \" (Minutes, hours or days)       Couple days ago    3. PATTERN: \"Does the pain come and go, or has it been constant since it started? \"      Comes and goes    4. SEVERITY: \"How bad is the pain? \" and \"What does it keep you from doing? \"  (e.g., Scale 1-10; mild, moderate, or severe)    - MILD (1-3): doesn't interfere with normal activities     - MODERATE (4-7): interferes with normal activities or awakens from sleep     - SEVERE (8-10): excruciating pain, unable to do any normal activities         7/10    5. RECURRENT SYMPTOM: \"Have you ever had headaches before? \" If so, ask: \"When was the last time? \" and \"What happened that time? \"      Yes hx migranes      6. CAUSE: \"What do you think is causing the headache? \"      Unsure    7. MIGRAINE: \"Have you been diagnosed with migraine headaches? \" If so, ask: \"Is this headache similar? \"       Yes    8. HEAD INJURY: \"Has there been any recent injury to the head? \"       Denies    9. OTHER SYMPTOMS: \"Do you have any other symptoms? \" (fever, stiff neck, eye pain, sore throat, cold symptoms)      Right eye,     10. PREGNANCY: \"Is there any chance you are pregnant? \" \"When was your last menstrual period? \"    Protocols used: HEADACHE-ADULT-[place]    Patient called pre-service center [facility] with red flag complaint.      Brief description of triage: as above c/o head pain which started 2-3 days ago no other symptoms today states radiated to chest right side no pain at time of call states comes and goes denies numbness or tingling no blurred vision,no slurred speech a/o x 3    Triage indicates for patient to be seen today or tomorrow    Care advice provided, patient verbalizes understanding; denies any other questions or concerns; instructed to call back for any new or worsening symptoms. Writer provided warm transfer to Igor   at [facility] for appointment scheduling. Attention Provider: Thank you for allowing me to participate in the care of your patient. The patient was connected to triage in response to information provided to the ECC. Please do not respond through this encounter as the response is not directed to a shared pool.

## 2021-01-18 ENCOUNTER — TELEPHONE (OUTPATIENT)
Dept: FAMILY MEDICINE CLINIC | Age: 56
End: 2021-01-18

## 2021-01-21 ENCOUNTER — TELEPHONE (OUTPATIENT)
Dept: FAMILY MEDICINE CLINIC | Age: 56
End: 2021-01-21

## 2021-01-22 ENCOUNTER — OFFICE VISIT (OUTPATIENT)
Dept: PODIATRY | Age: 56
End: 2021-01-22
Payer: COMMERCIAL

## 2021-01-22 VITALS
BODY MASS INDEX: 41.78 KG/M2 | SYSTOLIC BLOOD PRESSURE: 123 MMHG | HEIGHT: 66 IN | HEART RATE: 97 BPM | DIASTOLIC BLOOD PRESSURE: 84 MMHG | WEIGHT: 260 LBS

## 2021-01-22 DIAGNOSIS — Z89.422 HISTORY OF AMPUTATION OF LESSER TOE OF LEFT FOOT (HCC): ICD-10-CM

## 2021-01-22 DIAGNOSIS — Z87.2 HISTORY OF SKIN ULCER: ICD-10-CM

## 2021-01-22 DIAGNOSIS — E11.42 DIABETIC POLYNEUROPATHY ASSOCIATED WITH TYPE 2 DIABETES MELLITUS (HCC): ICD-10-CM

## 2021-01-22 DIAGNOSIS — E11.9 TYPE 2 DIABETES MELLITUS WITHOUT COMPLICATION, WITHOUT LONG-TERM CURRENT USE OF INSULIN (HCC): Primary | ICD-10-CM

## 2021-01-22 DIAGNOSIS — I73.9 PERIPHERAL VASCULAR DISORDER (HCC): ICD-10-CM

## 2021-01-22 PROCEDURE — G8484 FLU IMMUNIZE NO ADMIN: HCPCS | Performed by: STUDENT IN AN ORGANIZED HEALTH CARE EDUCATION/TRAINING PROGRAM

## 2021-01-22 PROCEDURE — G8427 DOCREV CUR MEDS BY ELIG CLIN: HCPCS | Performed by: STUDENT IN AN ORGANIZED HEALTH CARE EDUCATION/TRAINING PROGRAM

## 2021-01-22 PROCEDURE — 99202 OFFICE O/P NEW SF 15 MIN: CPT | Performed by: STUDENT IN AN ORGANIZED HEALTH CARE EDUCATION/TRAINING PROGRAM

## 2021-01-22 PROCEDURE — 1036F TOBACCO NON-USER: CPT | Performed by: STUDENT IN AN ORGANIZED HEALTH CARE EDUCATION/TRAINING PROGRAM

## 2021-01-22 PROCEDURE — G8417 CALC BMI ABV UP PARAM F/U: HCPCS | Performed by: STUDENT IN AN ORGANIZED HEALTH CARE EDUCATION/TRAINING PROGRAM

## 2021-01-22 PROCEDURE — 3017F COLORECTAL CA SCREEN DOC REV: CPT | Performed by: STUDENT IN AN ORGANIZED HEALTH CARE EDUCATION/TRAINING PROGRAM

## 2021-01-22 PROCEDURE — 99204 OFFICE O/P NEW MOD 45 MIN: CPT | Performed by: STUDENT IN AN ORGANIZED HEALTH CARE EDUCATION/TRAINING PROGRAM

## 2021-01-22 PROCEDURE — 2022F DILAT RTA XM EVC RTNOPTHY: CPT | Performed by: STUDENT IN AN ORGANIZED HEALTH CARE EDUCATION/TRAINING PROGRAM

## 2021-01-22 PROCEDURE — 3046F HEMOGLOBIN A1C LEVEL >9.0%: CPT | Performed by: STUDENT IN AN ORGANIZED HEALTH CARE EDUCATION/TRAINING PROGRAM

## 2021-01-22 NOTE — PROGRESS NOTES
[address]  [address] [place] [address], Olivier Teran  Tel: [phone]   Fax: [phone]    Subjective     CC: Bilateral leg pain and diabetic foot check     HPI:  Olivia Martinez is a 50y.o. year old female who presents to clinic today for initial evaluation of bilateral leg pain and to get established with diabetic foot care. Patient states that she has been having leg pain for quite awhile but that it's recently worsened. She relates feelings of burning that extend from her low back to her legs  And feet when she's sitting. She also relates that when she walks is only able to walk about 15 steps before bilateral leg pain causes her to sit down. Upon sitting the leg pain is relieved. This cycle happens repeatedly throughout ambulation. Patient doesn't recall having vascular studies in the past. Patient takes Gabapentin, 900mg x daily, for the pain but states its not working. Additionally, patient would like to get established with a podiatrist for regular diabetic foot checks. Patient relates that in the past she has had an infection in her left foot that subsequently resulted in a left 4th & 5th toe amputation. Patient has also suffered from previous lower extremity ulcerations that were slow to heal. Reportedly, patient's blood sugars have been more elevated than normal because of life stressors that include the loss of 2 children. Her last HgbA1c was 10% as of 1/13/2021. Of note, patient has a strong history of cocaine abuse and is currently in rehab. Patient denies tobacco abuse. Primary care physician is Apryl Lowe MD.    ROS:    Constitutional: Denies nausea, vomiting, fever, chills. Neurologic: Admits numbness, tingling, and burning in the legs and feet. Vascular: Admits symptoms of lower extremity claudication. Skin: Denies open wounds. Otherwise negative except as noted in the HPI.      PMH:  Past Medical History:   Diagnosis Date    Acid reflux 5/29/2017    Acute cystitis with hematuria 10/11/2016    Acute non-recurrent maxillary sinusitis 11/28/2017    Asthma     Bipolar 1 disorder ([address] 1/4/2018    Bipolar disorder, mixed ([address]     BMI 34.0-34.9,adult 11/28/2017    Cannabis use disorder, severe, dependence ([address] 12/19/2017    Cerebrovascular accident (CVA) ([address] 6/14/2017    Chest pain 11/5/2016    Chronic renal insufficiency     Cocaine abuse ([address] 1/5/2018    DDD (degenerative disc disease), cervical     Diabetes mellitus ([address]     Dizziness 6/13/2017    Fibromyalgia     History of stroke 9/9/2017    Homicidal ideation 11/6/2017    Hyperglycemia     Hypertension     Hypotension 1/18/2019    IDDM (insulin dependent diabetes mellitus) 12/21/2015    Lupus ([address]     Migraine     Neuropathy     Neuropathy     Polysubstance abuse ([address] 9/17/2017    Post traumatic stress disorder (PTSD)     Posttraumatic stress disorder 5/29/2017    Recurrent depression ([address] 5/29/2017    Recurrent major depressive disorder, in partial remission ([address] 11/28/2017    Screening mammogram, encounter for 11/28/2017    Severe recurrent major depression with psychotic features ([address] 12/19/2017    Severe recurrent major depression without psychotic features ([address] 12/19/2017    Stroke (cerebrum) ([address] 6/14/2017    Stroke ([address]     per chart notes    Suicidal ideation     Suicidal intent 3/10/2017    Vitamin D deficiency 11/28/2017    White matter changes 6/13/2017       Surgical History:   Past Surgical History:   Procedure Laterality Date    ABDOMEN SURGERY      drain tube    ABSCESS DRAINAGE      right buttock    CATARACT REMOVAL WITH IMPLANT Bilateral     CHEST TUBE INSERTION      LASIK Bilateral        Social History:  Social History     Tobacco Use    Smoking status: Never Smoker    Smokeless tobacco: Never Used   Substance Use Topics    Alcohol use: Not Currently    Drug use: Not Currently     Types: Marijuana, Cocaine Medications:  Prior to Admission medications    Medication Sig Start Date End Date Taking? Authorizing Provider   Misc.  Devices MISC 1 pair of dm shoes, 3 accommodated inserts 1/22/21  Yes Chantell Hallman DPM   insulin glargine (LANTUS SOLOSTAR) 100 UNIT/ML injection pen Inject 13 Units into the skin nightly 1/15/21  Yes Ramona Alberto MD   dicyclomine (BENTYL) 20 MG tablet Take 1 tablet by mouth 3 times daily as needed (for abdominal discomfort) 1/13/21  Yes Ramona Alberto MD   cetirizine (ZYRTEC) 10 MG tablet Take 1 tablet by mouth daily 1/13/21  Yes Ramona Alberto MD   Insulin Pen Needle ([employer] PEN NEEDLES 31G) 31G X 8 MM MISC 1 each by Does not apply route daily 1/4/21  Yes Ramona Alberto MD   ARIPiprazole (ABILIFY) 10 MG tablet Take 1 tablet by mouth daily 12/11/20  Yes Ramona Alberto MD    MG capsule TAKE 1 CAPSULE BY MOUTH TWICE DAILY 12/9/20  Yes Ramona Alberto MD   acetaminophen (TYLENOL) 325 MG tablet TAKE 2 TABLETS BY MOUTH EVERY 6 HOURS AS NEEDED FOR PAIN 12/9/20  Yes Ramona Alberto MD   metFORMIN (GLUCOPHAGE) 1000 MG tablet TAKE 1 TABLET BY MOUTH DAILY WITH BREAKFAST 12/9/20  Yes Ramona Alberto MD   traZODone (DESYREL) 150 MG tablet Take 1 tablet by mouth nightly 11/18/20  Yes Ramona Alberto MD   omeprazole (PRILOSEC) 20 MG delayed release capsule Take 1 capsule by mouth Daily 11/18/20  Yes Ramona Alberto MD   FREESTYLE LITE strip 1 each by Does not apply route 3 times daily 11/11/20  Yes Ramona Alberto MD   FreeStyle Lancets MISC 1 each by Does not apply route 3 times daily 11/11/20  Yes Ramona Alberto MD   Alcohol Swabs (ALCOHOL PREP) 70 % PADS 1 each by Does not apply route as needed (use as needed) Use_____times per day  Diagnosis: 250.0   Diabetes ___Insulin-Dependent___Non-Insulin Dependent 11/11/20  Yes Ramona Alberto MD   venlafaxine (EFFEXOR XR) 75 MG extended release capsule  11/2/20  Yes Historical Provider, MD   celecoxib (CELEBREX) 200 MG capsule  10/10/20  Yes Historical Provider, MD   FLOVENT  MCG/ACT inhaler Inhale 2 puffs into the lungs 2 times daily 10/20/20  Yes Kaitlin Rushing MD   SITagliptin (JANUVIA) 100 MG tablet Take 1 tablet by mouth daily 10/20/20  Yes Kaitlin Rushing MD   budesonide-formoterol (SYMBICORT) 80-4.5 MCG/ACT AERO Inhale 2 puffs into the lungs 2 times daily 10/20/20  Yes Kaitlin Rushing MD   melatonin (RA MELATONIN) 3 MG TABS tablet Take 1 tablet by mouth daily 10/20/20  Yes Kaitlin Rushing MD   citalopram (CELEXA) 40 MG tablet Take 1 tablet by mouth daily 9/12/17  Yes Alfonso Almaguer MD   gabapentin (NEURONTIN) 300 MG capsule TAKE 3 CAPSULES 900 MG BY MOUTH THREE TIMES DAILY 12/9/20 1/8/21  Kaitlin Rushing MD   FLUoxetine (PROZAC) 10 MG capsule Take 60 mg by mouth daily    Historical Provider, MD   benzonatate (TESSALON) 200 MG capsule  8/17/20   Historical Provider, MD   albuterol sulfate  (90 Base) MCG/ACT inhaler Inhale 2 puffs into the lungs every 4 hours as needed for Wheezing 10/20/20 11/20/20  Kaitlin Rushing MD       Objective     Vitals:    01/22/21 1313   BP: 123/84   Pulse: 97       Lab Results   Component Value Date    LABA1C 10 01/13/2021       Physical Exam:  General:  Alert and oriented x3. In no acute distress. Lower Extremity Physical Exam:    Vascular: DP pulses are palpable, Bilateral. PT pulses are not palpable, bilateral. CFT <3 seconds to all digits, Bilateral.  Mild nonpitting edema to lower extremities, Bilateral.  Hair growth is absent to the level of the digits, Bilateral.     Neuro: Saph/sural/SP/DP/plantar sensation diminished to light touch. Protective sensation is intact to 5/10 sites as tested with a 5.07g SWMF, Bilateral.     Musculoskeletal: EHL/FHL/GS/TA gross motor intact. No tenderness to palpation of calves, bilateral.  Gross deformity is left 4th & 5th digit amputations.       Dermatologic: No open lesions, Bilateral. Several fully epithelialized cicatrix to lower extremities, b/l. Interdigital maceration absent, Bilateral.  Nails 1-8 are normotrophic. Biomechanical Exam:    Right foot: 1st MTPJ: Loaded: 40 Unloaded:50  Right foot: 1st Ray: +1, -1. Plantarflexed. Right foot: Ankle DF knee extended: -2  Right foot: Ankle DF knee flexed: 0    Left foot: 1st MTPJ: Loaded: 40 Unloaded: 50  Left foot: 1st Ray: +1, -1. Plantarflexed. Left foot: Ankle DF knee extended: -2  Left foot: Ankle DF knee flexed: 0    Gait Analysis: Antalgic, early heel lift. Imaging: None. Jessa Zaragoza is a 50 y.o. female with     Diagnosis Orders   1. Type 2 diabetes mellitus without complication, without long-term current use of insulin ([facility])   DIABETES FOOT EXAM    VL LOWER EXTREMITY ARTERIAL SEGMENTAL PRESSURES W PPG    Misc. Devices MISC   2. History of skin ulcer  Misc. Devices MISC   3. Peripheral vascular disorder ([facility])  VL LOWER EXTREMITY ARTERIAL SEGMENTAL PRESSURES W PPG    Misc. Devices MISC   4. History of amputation of lesser toe of left foot ([facility]  DME Order for Walker as OP    Misc. Devices MISC   5. Diabetic polyneuropathy associated with type 2 diabetes mellitus ([facility])  VL LOWER EXTREMITY ARTERIAL SEGMENTAL PRESSURES W PPG    DME Order for Walker as OP    Misc. Devices MISC        Plan   · Patient examined and evaluated  · Diagnosis and treatment options discussed in detail. · Educated patient on the importance of   · Explained to patient there could be a variety of etiologies causing her lower extremity pain including poorly controlled diabetes, sciatica, claudication, and other sources of nerve damage. Advised that patient get non-invasive vascular studies to ensure adequate blood flow to the lower extremities--patient understands and is amenable. Additionally will order a walker and diabetic shoes for patient to aid in ambulation and help prevent future ulcerations, respectively.    · Order placed for a walker with wheels &

## 2021-01-22 NOTE — PROGRESS NOTES
Patient instructed to remove shoes and socks, instructed to sit in exam chair. Current PCP name is Kemigil Randi and date of last visit 1/13/21. Do you have a follow up visit scheduled? Yes    If yes the date is 2/19/21    Diabetic visit information    Blood pressure (Control is BP <140/90)  BP Readings from Last 3 Encounters:   01/13/21 135/83   12/30/20 138/88   11/06/20 114/68       BP taken with correct size cuff? - Yes    Repeated if > 140/90 Yes      Tobacco use:  Patient  reports that she has never smoked. She has never used smokeless tobacco.  If Smoker - Cessation materials given? - Yes       Diabetic Health Maintenance Items due  Diabetes Management   Topic Date Due    Diabetic retinal exam  [birth year]19/1975       Diabetic retinal exam done in last year? - Yes   If No: remind patient that it is due and they should schedule an exam    Medications  Is patient taking any medications for diabetes? -   No  Have blood sugars been controlled? Fasting blood sugars under 120   -   No   Random home sugars or today's POCT glucose is under 180 -   No   []  If No to the above then patient should schedule appt with PCP. Diabetic Plan    A1C Plan  Lab Results   Component Value Date    LABA1C 10 01/13/2021    LABA1C 9.1 12/30/2020    LABA1C 7.0 (H) 11/04/2020      []  If A1C over 8 and last result >3 months ago - Order A1C and refer to PCP   []  If last A1C over 6 months ago - Order A1C and refer to PCP for follow up   []  If elevated blood sugars > 180 - refer to PCP for follow up    []  Blood sugar controlled - A1C under 8 and last check was < 6 months      Cholesterol Plan   Lab Results   Component Value Date    LDLCHOLESTEROL 146 (H) 12/30/2020      []  If LDL > 100 and last result >3 months ago - order Fasting lipids and refer to PCP for follow up   []  If LDL < 100 and over 1 year ago - Order Fasting lipids and refer to PCP for follow up   [] LDL is controlled.   LDL < 100 and checked within the last year     Blood Pressure  BP Readings from Last 3 Encounters:   01/13/21 135/83   12/30/20 138/88   11/06/20 114/68      []  If SBP >140 mmhg - refer to PCP for follow up   []  If DBP > 90 mmhg - refer to PCP for follow up   [] BP is controlled <140/90     Order labs as PCP ordered.   (ie: Lipids, A1C, CMP)

## 2021-01-29 ENCOUNTER — TELEPHONE (OUTPATIENT)
Dept: FAMILY MEDICINE CLINIC | Age: 56
End: 2021-01-29

## 2021-01-29 DIAGNOSIS — Z12.11 COLON CANCER SCREENING: Primary | ICD-10-CM

## 2021-01-29 NOTE — TELEPHONE ENCOUNTER
Received a note from Lyla yesterday in regards to patient and a Colonoscopy / Cologuard. Writer checked in [facility], and an order was placed on 11/09/2020, and it showed kit was delivered to patient. Writer spoke with fabián, and they stated no where in there system do they have a result for a cologuard. The order that was placed on 11/09/2020, is not in [facility], so office has no record of the order. Order was cancelled and a new order will be placed. Writer also called [facility]. According to care everywhere, patient had a colonoscopy in 2017. Writer spoke with [facility], and they have no record of a colonoscopy for patient. Writer has attempted to reach patient, and no answer and voicemail is unavailable.

## 2021-02-07 ENCOUNTER — APPOINTMENT (OUTPATIENT)
Dept: GENERAL RADIOLOGY | Age: 56
DRG: 420 | End: 2021-02-07
Payer: COMMERCIAL

## 2021-02-07 ENCOUNTER — APPOINTMENT (OUTPATIENT)
Dept: CT IMAGING | Age: 56
DRG: 420 | End: 2021-02-07
Payer: COMMERCIAL

## 2021-02-07 ENCOUNTER — HOSPITAL ENCOUNTER (INPATIENT)
Age: 56
LOS: 3 days | Discharge: NURSING, REHAB OR LONG-TERM CARE | DRG: 420 | End: 2021-02-10
Attending: EMERGENCY MEDICINE | Admitting: FAMILY MEDICINE
Payer: COMMERCIAL

## 2021-02-07 DIAGNOSIS — E87.20 METABOLIC ACIDOSIS: ICD-10-CM

## 2021-02-07 DIAGNOSIS — E11.9 TYPE 2 DIABETES MELLITUS WITHOUT COMPLICATION, WITHOUT LONG-TERM CURRENT USE OF INSULIN (HCC): ICD-10-CM

## 2021-02-07 DIAGNOSIS — R07.9 CHEST PAIN, UNSPECIFIED TYPE: ICD-10-CM

## 2021-02-07 DIAGNOSIS — R73.9 HYPERGLYCEMIA: Primary | ICD-10-CM

## 2021-02-07 DIAGNOSIS — N17.9 AKI (ACUTE KIDNEY INJURY) (HCC): ICD-10-CM

## 2021-02-07 LAB
ABSOLUTE EOS #: 0.1 K/UL (ref 0–0.44)
ABSOLUTE IMMATURE GRANULOCYTE: 0.05 K/UL (ref 0–0.3)
ABSOLUTE LYMPH #: 1.8 K/UL (ref 1.1–3.7)
ABSOLUTE MONO #: 0.85 K/UL (ref 0.1–1.2)
ALBUMIN SERPL-MCNC: 3.8 G/DL (ref 3.5–5.2)
ALBUMIN/GLOBULIN RATIO: 0.9 (ref 1–2.5)
ALLEN TEST: ABNORMAL
ALP BLD-CCNC: 144 U/L (ref 35–104)
ALT SERPL-CCNC: 17 U/L (ref 5–33)
AMPHETAMINE SCREEN URINE: NEGATIVE
ANION GAP SERPL CALCULATED.3IONS-SCNC: 23 MMOL/L (ref 9–17)
ANION GAP: 9 MMOL/L (ref 7–16)
AST SERPL-CCNC: 13 U/L
BARBITURATE SCREEN URINE: NEGATIVE
BASOPHILS # BLD: 1 % (ref 0–2)
BASOPHILS ABSOLUTE: 0.06 K/UL (ref 0–0.2)
BENZODIAZEPINE SCREEN, URINE: NEGATIVE
BETA-HYDROXYBUTYRATE: 5.64 MMOL/L (ref 0.02–0.27)
BILIRUB SERPL-MCNC: 0.3 MG/DL (ref 0.3–1.2)
BILIRUBIN URINE: NEGATIVE
BUN BLDV-MCNC: 19 MG/DL (ref 6–20)
BUN/CREAT BLD: ABNORMAL (ref 9–20)
BUPRENORPHINE URINE: ABNORMAL
CALCIUM SERPL-MCNC: 9.4 MG/DL (ref 8.6–10.4)
CANNABINOID SCREEN URINE: NEGATIVE
CHLORIDE BLD-SCNC: 91 MMOL/L (ref 98–107)
CO2: 18 MMOL/L (ref 20–31)
COCAINE METABOLITE, URINE: POSITIVE
COLOR: YELLOW
COMMENT UA: ABNORMAL
CREAT SERPL-MCNC: 1.32 MG/DL (ref 0.5–0.9)
CREATININE URINE: 46 MG/DL (ref 28–217)
D-DIMER QUANTITATIVE: 1.18 MG/L FEU
DIFFERENTIAL TYPE: ABNORMAL
EOSINOPHILS RELATIVE PERCENT: 1 % (ref 1–4)
FIO2: ABNORMAL
GFR AFRICAN AMERICAN: 51 ML/MIN
GFR NON-AFRICAN AMERICAN: 40 ML/MIN
GFR NON-AFRICAN AMERICAN: 42 ML/MIN
GFR SERPL CREATININE-BSD FRML MDRD: 49 ML/MIN
GFR SERPL CREATININE-BSD FRML MDRD: ABNORMAL ML/MIN/{1.73_M2}
GLUCOSE BLD-MCNC: 259 MG/DL (ref 65–105)
GLUCOSE BLD-MCNC: 351 MG/DL (ref 65–105)
GLUCOSE BLD-MCNC: 367 MG/DL (ref 65–105)
GLUCOSE BLD-MCNC: 408 MG/DL (ref 65–105)
GLUCOSE BLD-MCNC: 409 MG/DL (ref 65–105)
GLUCOSE BLD-MCNC: 538 MG/DL (ref 70–99)
GLUCOSE BLD-MCNC: 591 MG/DL (ref 74–100)
GLUCOSE URINE: ABNORMAL
HCG QUALITATIVE: NEGATIVE
HCO3 VENOUS: 25.4 MMOL/L (ref 22–29)
HCT VFR BLD CALC: 38.3 % (ref 36.3–47.1)
HEMOGLOBIN: 11.9 G/DL (ref 11.9–15.1)
IMMATURE GRANULOCYTES: 1 %
KETONES, URINE: ABNORMAL
LEUKOCYTE ESTERASE, URINE: NEGATIVE
LIPASE: 80 U/L (ref 13–60)
LYMPHOCYTES # BLD: 22 % (ref 24–43)
MCH RBC QN AUTO: 30.6 PG (ref 25.2–33.5)
MCHC RBC AUTO-ENTMCNC: 31.1 G/DL (ref 28.4–34.8)
MCV RBC AUTO: 98.5 FL (ref 82.6–102.9)
MDMA URINE: ABNORMAL
METHADONE SCREEN, URINE: NEGATIVE
METHAMPHETAMINE, URINE: ABNORMAL
MODE: ABNORMAL
MONOCYTES # BLD: 10 % (ref 3–12)
NEGATIVE BASE EXCESS, VEN: 2 (ref 0–2)
NITRITE, URINE: NEGATIVE
NRBC AUTOMATED: 0 PER 100 WBC
O2 DEVICE/FLOW/%: ABNORMAL
O2 SAT, VEN: 36 % (ref 60–85)
OPIATES, URINE: NEGATIVE
OXYCODONE SCREEN URINE: NEGATIVE
PATIENT TEMP: ABNORMAL
PCO2, VEN: 49.9 MM HG (ref 41–51)
PDW BLD-RTO: 12.4 % (ref 11.8–14.4)
PH UA: 5.5 (ref 5–8)
PH VENOUS: 7.31 (ref 7.32–7.43)
PHENCYCLIDINE, URINE: NEGATIVE
PLATELET # BLD: 390 K/UL (ref 138–453)
PLATELET ESTIMATE: ABNORMAL
PMV BLD AUTO: 11.2 FL (ref 8.1–13.5)
PO2, VEN: 23.7 MM HG (ref 30–50)
POC CHLORIDE: 96 MMOL/L (ref 98–107)
POC CREATININE: 1.37 MG/DL (ref 0.51–1.19)
POC HEMATOCRIT: 44 % (ref 36–46)
POC HEMOGLOBIN: 14.9 G/DL (ref 12–16)
POC IONIZED CALCIUM: 1.19 MMOL/L (ref 1.15–1.33)
POC LACTIC ACID: 1.78 MMOL/L (ref 0.56–1.39)
POC PCO2 TEMP: ABNORMAL MM HG
POC PH TEMP: ABNORMAL
POC PO2 TEMP: ABNORMAL MM HG
POC POTASSIUM: 4.4 MMOL/L (ref 3.5–4.5)
POC SODIUM: 130 MMOL/L (ref 138–146)
POSITIVE BASE EXCESS, VEN: ABNORMAL (ref 0–3)
POTASSIUM SERPL-SCNC: 4.6 MMOL/L (ref 3.7–5.3)
PROPOXYPHENE, URINE: ABNORMAL
PROTEIN UA: NEGATIVE
RBC # BLD: 3.89 M/UL (ref 3.95–5.11)
RBC # BLD: ABNORMAL 10*6/UL
SAMPLE SITE: ABNORMAL
SEG NEUTROPHILS: 65 % (ref 36–65)
SEGMENTED NEUTROPHILS ABSOLUTE COUNT: 5.5 K/UL (ref 1.5–8.1)
SODIUM BLD-SCNC: 132 MMOL/L (ref 135–144)
SODIUM,UR: 36 MMOL/L
SPECIFIC GRAVITY UA: 1.03 (ref 1–1.03)
TEST INFORMATION: ABNORMAL
TOTAL CO2, VENOUS: 27 MMOL/L (ref 23–30)
TOTAL PROTEIN: 8.1 G/DL (ref 6.4–8.3)
TRICYCLIC ANTIDEPRESSANTS, UR: ABNORMAL
TROPONIN INTERP: ABNORMAL
TROPONIN INTERP: NORMAL
TROPONIN INTERP: NORMAL
TROPONIN T: ABNORMAL NG/ML
TROPONIN T: NORMAL NG/ML
TROPONIN T: NORMAL NG/ML
TROPONIN, HIGH SENSITIVITY: 13 NG/L (ref 0–14)
TROPONIN, HIGH SENSITIVITY: 13 NG/L (ref 0–14)
TROPONIN, HIGH SENSITIVITY: 15 NG/L (ref 0–14)
TURBIDITY: CLEAR
URINE HGB: NEGATIVE
UROBILINOGEN, URINE: NORMAL
WBC # BLD: 8.4 K/UL (ref 3.5–11.3)
WBC # BLD: ABNORMAL 10*3/UL

## 2021-02-07 PROCEDURE — 83690 ASSAY OF LIPASE: CPT

## 2021-02-07 PROCEDURE — 6360000002 HC RX W HCPCS: Performed by: STUDENT IN AN ORGANIZED HEALTH CARE EDUCATION/TRAINING PROGRAM

## 2021-02-07 PROCEDURE — 80053 COMPREHEN METABOLIC PANEL: CPT

## 2021-02-07 PROCEDURE — 82570 ASSAY OF URINE CREATININE: CPT

## 2021-02-07 PROCEDURE — 84300 ASSAY OF URINE SODIUM: CPT

## 2021-02-07 PROCEDURE — 6360000004 HC RX CONTRAST MEDICATION: Performed by: STUDENT IN AN ORGANIZED HEALTH CARE EDUCATION/TRAINING PROGRAM

## 2021-02-07 PROCEDURE — 80307 DRUG TEST PRSMV CHEM ANLYZR: CPT

## 2021-02-07 PROCEDURE — 99285 EMERGENCY DEPT VISIT HI MDM: CPT

## 2021-02-07 PROCEDURE — 85379 FIBRIN DEGRADATION QUANT: CPT

## 2021-02-07 PROCEDURE — 6370000000 HC RX 637 (ALT 250 FOR IP): Performed by: STUDENT IN AN ORGANIZED HEALTH CARE EDUCATION/TRAINING PROGRAM

## 2021-02-07 PROCEDURE — 36415 COLL VENOUS BLD VENIPUNCTURE: CPT

## 2021-02-07 PROCEDURE — 2580000003 HC RX 258: Performed by: STUDENT IN AN ORGANIZED HEALTH CARE EDUCATION/TRAINING PROGRAM

## 2021-02-07 PROCEDURE — 84703 CHORIONIC GONADOTROPIN ASSAY: CPT

## 2021-02-07 PROCEDURE — 82330 ASSAY OF CALCIUM: CPT

## 2021-02-07 PROCEDURE — 71045 X-RAY EXAM CHEST 1 VIEW: CPT

## 2021-02-07 PROCEDURE — 99223 1ST HOSP IP/OBS HIGH 75: CPT | Performed by: FAMILY MEDICINE

## 2021-02-07 PROCEDURE — 85014 HEMATOCRIT: CPT

## 2021-02-07 PROCEDURE — 84132 ASSAY OF SERUM POTASSIUM: CPT

## 2021-02-07 PROCEDURE — 94760 N-INVAS EAR/PLS OXIMETRY 1: CPT

## 2021-02-07 PROCEDURE — 82435 ASSAY OF BLOOD CHLORIDE: CPT

## 2021-02-07 PROCEDURE — 82010 KETONE BODYS QUAN: CPT

## 2021-02-07 PROCEDURE — 82947 ASSAY GLUCOSE BLOOD QUANT: CPT

## 2021-02-07 PROCEDURE — 2500000003 HC RX 250 WO HCPCS: Performed by: STUDENT IN AN ORGANIZED HEALTH CARE EDUCATION/TRAINING PROGRAM

## 2021-02-07 PROCEDURE — 82565 ASSAY OF CREATININE: CPT

## 2021-02-07 PROCEDURE — 93005 ELECTROCARDIOGRAM TRACING: CPT | Performed by: STUDENT IN AN ORGANIZED HEALTH CARE EDUCATION/TRAINING PROGRAM

## 2021-02-07 PROCEDURE — 96372 THER/PROPH/DIAG INJ SC/IM: CPT

## 2021-02-07 PROCEDURE — 82803 BLOOD GASES ANY COMBINATION: CPT

## 2021-02-07 PROCEDURE — 85025 COMPLETE CBC W/AUTO DIFF WBC: CPT

## 2021-02-07 PROCEDURE — 96375 TX/PRO/DX INJ NEW DRUG ADDON: CPT

## 2021-02-07 PROCEDURE — 84295 ASSAY OF SERUM SODIUM: CPT

## 2021-02-07 PROCEDURE — 84484 ASSAY OF TROPONIN QUANT: CPT

## 2021-02-07 PROCEDURE — 1200000000 HC SEMI PRIVATE

## 2021-02-07 PROCEDURE — 96374 THER/PROPH/DIAG INJ IV PUSH: CPT

## 2021-02-07 PROCEDURE — 94640 AIRWAY INHALATION TREATMENT: CPT

## 2021-02-07 PROCEDURE — 81003 URINALYSIS AUTO W/O SCOPE: CPT

## 2021-02-07 PROCEDURE — 71260 CT THORAX DX C+: CPT

## 2021-02-07 PROCEDURE — 83605 ASSAY OF LACTIC ACID: CPT

## 2021-02-07 RX ORDER — DEXTROSE MONOHYDRATE 50 MG/ML
100 INJECTION, SOLUTION INTRAVENOUS PRN
Status: DISCONTINUED | OUTPATIENT
Start: 2021-02-07 | End: 2021-02-07

## 2021-02-07 RX ORDER — POLYETHYLENE GLYCOL 3350 17 G/17G
17 POWDER, FOR SOLUTION ORAL DAILY PRN
Status: DISCONTINUED | OUTPATIENT
Start: 2021-02-07 | End: 2021-02-10 | Stop reason: HOSPADM

## 2021-02-07 RX ORDER — NICOTINE POLACRILEX 4 MG
15 LOZENGE BUCCAL PRN
Status: DISCONTINUED | OUTPATIENT
Start: 2021-02-07 | End: 2021-02-07

## 2021-02-07 RX ORDER — POTASSIUM CHLORIDE 20 MEQ/1
40 TABLET, EXTENDED RELEASE ORAL PRN
Status: DISCONTINUED | OUTPATIENT
Start: 2021-02-07 | End: 2021-02-10 | Stop reason: HOSPADM

## 2021-02-07 RX ORDER — 0.9 % SODIUM CHLORIDE 0.9 %
1000 INTRAVENOUS SOLUTION INTRAVENOUS ONCE
Status: COMPLETED | OUTPATIENT
Start: 2021-02-07 | End: 2021-02-07

## 2021-02-07 RX ORDER — INSULIN GLARGINE 100 [IU]/ML
10 INJECTION, SOLUTION SUBCUTANEOUS NIGHTLY
Status: DISCONTINUED | OUTPATIENT
Start: 2021-02-07 | End: 2021-02-07

## 2021-02-07 RX ORDER — SODIUM CHLORIDE 9 MG/ML
INJECTION, SOLUTION INTRAVENOUS CONTINUOUS
Status: DISCONTINUED | OUTPATIENT
Start: 2021-02-07 | End: 2021-02-08

## 2021-02-07 RX ORDER — SODIUM CHLORIDE 9 MG/ML
INJECTION, SOLUTION INTRAVENOUS CONTINUOUS
Status: CANCELLED | OUTPATIENT
Start: 2021-02-07

## 2021-02-07 RX ORDER — ACETAMINOPHEN 325 MG/1
650 TABLET ORAL EVERY 6 HOURS PRN
Status: DISCONTINUED | OUTPATIENT
Start: 2021-02-07 | End: 2021-02-10 | Stop reason: HOSPADM

## 2021-02-07 RX ORDER — SODIUM CHLORIDE 0.9 % (FLUSH) 0.9 %
10 SYRINGE (ML) INJECTION PRN
Status: DISCONTINUED | OUTPATIENT
Start: 2021-02-07 | End: 2021-02-10 | Stop reason: HOSPADM

## 2021-02-07 RX ORDER — ARIPIPRAZOLE 10 MG/1
10 TABLET ORAL DAILY
Status: DISCONTINUED | OUTPATIENT
Start: 2021-02-07 | End: 2021-02-10 | Stop reason: HOSPADM

## 2021-02-07 RX ORDER — NICOTINE POLACRILEX 4 MG
15 LOZENGE BUCCAL PRN
Status: DISCONTINUED | OUTPATIENT
Start: 2021-02-07 | End: 2021-02-10 | Stop reason: HOSPADM

## 2021-02-07 RX ORDER — DEXTROSE MONOHYDRATE 25 G/50ML
12.5 INJECTION, SOLUTION INTRAVENOUS PRN
Status: DISCONTINUED | OUTPATIENT
Start: 2021-02-07 | End: 2021-02-10 | Stop reason: HOSPADM

## 2021-02-07 RX ORDER — PROMETHAZINE HYDROCHLORIDE 12.5 MG/1
12.5 TABLET ORAL EVERY 6 HOURS PRN
Status: DISCONTINUED | OUTPATIENT
Start: 2021-02-07 | End: 2021-02-10 | Stop reason: HOSPADM

## 2021-02-07 RX ORDER — POTASSIUM CHLORIDE 7.45 MG/ML
10 INJECTION INTRAVENOUS PRN
Status: DISCONTINUED | OUTPATIENT
Start: 2021-02-07 | End: 2021-02-10 | Stop reason: HOSPADM

## 2021-02-07 RX ORDER — ALBUTEROL SULFATE 2.5 MG/3ML
2.5 SOLUTION RESPIRATORY (INHALATION)
Status: DISCONTINUED | OUTPATIENT
Start: 2021-02-07 | End: 2021-02-10 | Stop reason: HOSPADM

## 2021-02-07 RX ORDER — FAMOTIDINE 20 MG/1
20 TABLET, FILM COATED ORAL 2 TIMES DAILY
Status: DISCONTINUED | OUTPATIENT
Start: 2021-02-07 | End: 2021-02-10 | Stop reason: HOSPADM

## 2021-02-07 RX ORDER — INSULIN GLARGINE 100 [IU]/ML
15 INJECTION, SOLUTION SUBCUTANEOUS NIGHTLY
Status: DISCONTINUED | OUTPATIENT
Start: 2021-02-07 | End: 2021-02-08

## 2021-02-07 RX ORDER — DEXTROSE MONOHYDRATE 50 MG/ML
100 INJECTION, SOLUTION INTRAVENOUS PRN
Status: DISCONTINUED | OUTPATIENT
Start: 2021-02-07 | End: 2021-02-10 | Stop reason: HOSPADM

## 2021-02-07 RX ORDER — DICYCLOMINE HYDROCHLORIDE 10 MG/ML
20 INJECTION INTRAMUSCULAR ONCE
Status: COMPLETED | OUTPATIENT
Start: 2021-02-07 | End: 2021-02-07

## 2021-02-07 RX ORDER — ONDANSETRON 2 MG/ML
4 INJECTION INTRAMUSCULAR; INTRAVENOUS ONCE
Status: COMPLETED | OUTPATIENT
Start: 2021-02-07 | End: 2021-02-07

## 2021-02-07 RX ORDER — ACETAMINOPHEN 325 MG/1
650 TABLET ORAL EVERY 4 HOURS PRN
Status: DISCONTINUED | OUTPATIENT
Start: 2021-02-07 | End: 2021-02-07 | Stop reason: SDUPTHER

## 2021-02-07 RX ORDER — BUDESONIDE AND FORMOTEROL FUMARATE DIHYDRATE 80; 4.5 UG/1; UG/1
2 AEROSOL RESPIRATORY (INHALATION) 2 TIMES DAILY
Status: DISCONTINUED | OUTPATIENT
Start: 2021-02-07 | End: 2021-02-10 | Stop reason: HOSPADM

## 2021-02-07 RX ORDER — LIDOCAINE 4 G/G
1 PATCH TOPICAL DAILY
Status: DISCONTINUED | OUTPATIENT
Start: 2021-02-08 | End: 2021-02-10 | Stop reason: HOSPADM

## 2021-02-07 RX ORDER — VENLAFAXINE HYDROCHLORIDE 75 MG/1
75 CAPSULE, EXTENDED RELEASE ORAL
Status: DISCONTINUED | OUTPATIENT
Start: 2021-02-07 | End: 2021-02-08

## 2021-02-07 RX ORDER — INSULIN GLARGINE 100 [IU]/ML
8 INJECTION, SOLUTION SUBCUTANEOUS ONCE
Status: COMPLETED | OUTPATIENT
Start: 2021-02-07 | End: 2021-02-07

## 2021-02-07 RX ORDER — DEXTROSE MONOHYDRATE 25 G/50ML
12.5 INJECTION, SOLUTION INTRAVENOUS PRN
Status: DISCONTINUED | OUTPATIENT
Start: 2021-02-07 | End: 2021-02-07

## 2021-02-07 RX ORDER — MAGNESIUM SULFATE IN WATER 40 MG/ML
2000 INJECTION, SOLUTION INTRAVENOUS PRN
Status: DISCONTINUED | OUTPATIENT
Start: 2021-02-07 | End: 2021-02-10 | Stop reason: HOSPADM

## 2021-02-07 RX ORDER — SODIUM CHLORIDE 0.9 % (FLUSH) 0.9 %
10 SYRINGE (ML) INJECTION EVERY 12 HOURS SCHEDULED
Status: DISCONTINUED | OUTPATIENT
Start: 2021-02-07 | End: 2021-02-10 | Stop reason: HOSPADM

## 2021-02-07 RX ORDER — ONDANSETRON 2 MG/ML
4 INJECTION INTRAMUSCULAR; INTRAVENOUS EVERY 6 HOURS PRN
Status: DISCONTINUED | OUTPATIENT
Start: 2021-02-07 | End: 2021-02-10 | Stop reason: HOSPADM

## 2021-02-07 RX ORDER — ACETAMINOPHEN 650 MG/1
650 SUPPOSITORY RECTAL EVERY 6 HOURS PRN
Status: DISCONTINUED | OUTPATIENT
Start: 2021-02-07 | End: 2021-02-10 | Stop reason: HOSPADM

## 2021-02-07 RX ADMIN — INSULIN LISPRO 8 UNITS: 100 INJECTION, SOLUTION INTRAVENOUS; SUBCUTANEOUS at 09:25

## 2021-02-07 RX ADMIN — FAMOTIDINE 20 MG: 10 INJECTION INTRAVENOUS at 02:12

## 2021-02-07 RX ADMIN — FAMOTIDINE 20 MG: 20 TABLET, FILM COATED ORAL at 09:25

## 2021-02-07 RX ADMIN — VENLAFAXINE HYDROCHLORIDE 75 MG: 75 CAPSULE, EXTENDED RELEASE ORAL at 09:25

## 2021-02-07 RX ADMIN — INSULIN LISPRO 10 UNITS: 100 INJECTION, SOLUTION INTRAVENOUS; SUBCUTANEOUS at 12:13

## 2021-02-07 RX ADMIN — FAMOTIDINE 20 MG: 20 TABLET, FILM COATED ORAL at 21:20

## 2021-02-07 RX ADMIN — ENOXAPARIN SODIUM 40 MG: 40 INJECTION SUBCUTANEOUS at 09:25

## 2021-02-07 RX ADMIN — INSULIN GLARGINE 8 UNITS: 100 INJECTION, SOLUTION SUBCUTANEOUS at 03:42

## 2021-02-07 RX ADMIN — SODIUM CHLORIDE 1000 ML: 9 INJECTION, SOLUTION INTRAVENOUS at 03:42

## 2021-02-07 RX ADMIN — SODIUM CHLORIDE 1000 ML: 9 INJECTION, SOLUTION INTRAVENOUS at 02:12

## 2021-02-07 RX ADMIN — INSULIN LISPRO 6 UNITS: 100 INJECTION, SOLUTION INTRAVENOUS; SUBCUTANEOUS at 17:29

## 2021-02-07 RX ADMIN — INSULIN LISPRO 4 UNITS: 100 INJECTION, SOLUTION INTRAVENOUS; SUBCUTANEOUS at 21:08

## 2021-02-07 RX ADMIN — SODIUM CHLORIDE: 9 INJECTION, SOLUTION INTRAVENOUS at 07:39

## 2021-02-07 RX ADMIN — BUDESONIDE AND FORMOTEROL FUMARATE DIHYDRATE 2 PUFF: 80; 4.5 AEROSOL RESPIRATORY (INHALATION) at 08:38

## 2021-02-07 RX ADMIN — IOPAMIDOL 90 ML: 755 INJECTION, SOLUTION INTRAVENOUS at 04:20

## 2021-02-07 RX ADMIN — ARIPIPRAZOLE 10 MG: 10 TABLET ORAL at 09:25

## 2021-02-07 RX ADMIN — INSULIN GLARGINE 15 UNITS: 100 INJECTION, SOLUTION SUBCUTANEOUS at 21:08

## 2021-02-07 RX ADMIN — DICYCLOMINE HYDROCHLORIDE 20 MG: 10 INJECTION INTRAMUSCULAR at 02:12

## 2021-02-07 RX ADMIN — ONDANSETRON 4 MG: 2 INJECTION INTRAMUSCULAR; INTRAVENOUS at 02:12

## 2021-02-07 ASSESSMENT — PAIN DESCRIPTION - PAIN TYPE: TYPE: ACUTE PAIN

## 2021-02-07 ASSESSMENT — ENCOUNTER SYMPTOMS
ABDOMINAL PAIN: 1
SHORTNESS OF BREATH: 0
VOMITING: 0
DIARRHEA: 0
NAUSEA: 0

## 2021-02-07 ASSESSMENT — PAIN SCALES - GENERAL
PAINLEVEL_OUTOF10: 10
PAINLEVEL_OUTOF10: 0
PAINLEVEL_OUTOF10: 10

## 2021-02-07 ASSESSMENT — PAIN DESCRIPTION - LOCATION: LOCATION: GENERALIZED

## 2021-02-07 NOTE — CARE COORDINATION
Transitional planning. Went to room twice and pt will not open eyes when writer tries to interview. Called both contacts and left messages to call me.

## 2021-02-07 NOTE — ED PROVIDER NOTES
[address] [facility]     Emergency Department     Faculty Attestation    I performed a history and physical examination of the patient and discussed management with the resident. I have reviewed and agree with the residents findings including all diagnostic interpretations, and treatment plans as written. Any areas of disagreement are noted on the chart. I was personally present for the key portions of any procedures. I have documented in the chart those procedures where I was not present during the key portions. I have reviewed the emergency nurses triage note. I agree with the chief complaint, past medical history, past surgical history, allergies, medications, social and family history as documented unless otherwise noted below. Documentation of the HPI, Physical Exam and Medical Decision Making performed by scribjesika is based on my personal performance of the HPI, PE and MDM. For Physician Assistant/ Nurse Practitioner cases/documentation I have personally evaluated this patient and have completed at least one if not all key elements of the E/M (history, physical exam, and MDM). Additional findings are as noted. 49 yo F c/o n/v, no fever, no head injury,   Pt missed pcp appointment 4 d prior,   Not taking insulin in last 6 days,   pe Jenna RN vss, mark, no cervical tenderness crepitus or deformity,   Chest non tender, abdomen non tender, no distension, no rigidity, no guarding, no calf swelling, no calf tenderness,     Ct no pe, trop 13, admitting to family medicine for tx for + ketone / hi glucose / hi AG, glucose improving,       EKG Interpretation    Interpreted by me  Normal sinus, hr 96, no ischemia, normal axis, qtc 477    CRITICAL CARE: There was a high probability of clinically significant/life threatening deterioration in this patient's condition which required my urgent intervention. Total critical care time was 15 minutes.   This excludes any time for separately reportable procedures.        [facility], DO  02/07/21 [address], DO  02/07/21 [id number]

## 2021-02-07 NOTE — PROGRESS NOTES
Physical Therapy    DATE: 2021    NAME: Miriam Freeman  MRN: [id number]   : 1965      Patient not seen this date for Physical Therapy due to:    Patient Declined: Pt would not open eyes. Mumbled and said not now.       Electronically signed by Kim English PT on 2021 at 11:21 AM

## 2021-02-07 NOTE — PROGRESS NOTES
Elroy Guzmán, [facility] Acute kidney injury ([facility] [N17.9] . Vitals:    02/07/21 0441   BP: 124/76   Pulse: 97   Resp: 17   Temp: 98.2 °F (36.8 °C)   SpO2: 100%   . Patients home meds are   Prior to Admission medications    Medication Sig Start Date End Date Taking? Authorizing Provider   Misc.  Devices MISC 1 pair of dm shoes, 3 accommodated inserts 1/22/21   Jessica Izaguirres, LAQUITAM   insulin glargine (LANTUS SOLOSTAR) 100 UNIT/ML injection pen Inject 13 Units into the skin nightly 1/15/21   Renae Gary MD   dicyclomine (BENTYL) 20 MG tablet Take 1 tablet by mouth 3 times daily as needed (for abdominal discomfort) 1/13/21   Renae Gary MD   cetirizine (ZYRTEC) 10 MG tablet Take 1 tablet by mouth daily 1/13/21   Renae Gary MD   Insulin Pen Needle ([employer] PEN NEEDLES 31G) 31G X 8 MM MISC 1 each by Does not apply route daily 1/4/21   Renae Gary MD   ARIPiprazole (ABILIFY) 10 MG tablet Take 1 tablet by mouth daily 12/11/20   Renae Gary MD   gabapentin (NEURONTIN) 300 MG capsule TAKE 3 CAPSULES 900 MG BY MOUTH THREE TIMES DAILY 12/9/20 1/8/21  Renae Gary MD    MG capsule TAKE 1 CAPSULE BY MOUTH TWICE DAILY 12/9/20   Renae Gary MD   acetaminophen (TYLENOL) 325 MG tablet TAKE 2 TABLETS BY MOUTH EVERY 6 HOURS AS NEEDED FOR PAIN 12/9/20   Renae Gary MD   metFORMIN (GLUCOPHAGE) 1000 MG tablet TAKE 1 TABLET BY MOUTH DAILY WITH BREAKFAST 12/9/20   Renae Gary MD   traZODone (DESYREL) 150 MG tablet Take 1 tablet by mouth nightly 11/18/20   Renae Gary MD   omeprazole (PRILOSEC) 20 MG delayed release capsule Take 1 capsule by mouth Daily 11/18/20   Renae Gary MD   FREESTYLE LITE strip 1 each by Does not apply route 3 times daily 11/11/20   Renae Gary MD   FreeStyle Lancets MISC 1 each by Does not apply route 3 times daily 11/11/20   Renae Gary MD   Alcohol Swabs (ALCOHOL PREP) 70 % PADS 1 each by Does not apply route as needed (use as needed) Use_____times per day  Diagnosis: 250.0   Diabetes ___Insulin-Dependent___Non-Insulin Dependent 11/11/20   Nataliya wOens MD   venlafaxine (EFFEXOR XR) 75 MG extended release capsule  11/2/20   Historical Provider, MD   FLUoxetine (PROZAC) 10 MG capsule Take 60 mg by mouth daily    Historical Provider, MD   benzonatate (TESSALON) 200 MG capsule  8/17/20   Historical Provider, MD   celecoxib (CELEBREX) 200 MG capsule  10/10/20   Historical Provider, MD   albuterol sulfate  (90 Base) MCG/ACT inhaler Inhale 2 puffs into the lungs every 4 hours as needed for Wheezing 10/20/20 11/20/20  Nataliya Owens MD   FLOVENT  MCG/ACT inhaler Inhale 2 puffs into the lungs 2 times daily 10/20/20   Nataliya Owens MD   SITagliptin (JANUVIA) 100 MG tablet Take 1 tablet by mouth daily 10/20/20   Nataliya Owens MD   budesonide-formoterol (SYMBICORT) 80-4.5 MCG/ACT AERO Inhale 2 puffs into the lungs 2 times daily 10/20/20   Nataliya Owens MD   melatonin (RA MELATONIN) 3 MG TABS tablet Take 1 tablet by mouth daily 10/20/20   Nataliya Owens MD   citalopram (CELEXA) 40 MG tablet Take 1 tablet by mouth daily 9/12/17   Alfonso Almaguer MD   .  Assessment     Patient related for non-respiratory issue but does have a history of asthma. Pt takes albuterol Q4 PRN and Symbicort BID at home. Pt has clear breath sounds and states she is not feeling SOB. I recommend keeping the pt on her home med schedule at this time.      RR 17  Breath Sounds: Clear      · Bronchodilator assessment at level 1 home meds  · Hyperinflation assessment at level   · Secretion Management assessment at level    ·   · [x]    Bronchodilator Assessment  BRONCHODILATOR ASSESSMENT SCORE  Score 0 1 2 3 4 5   Breath Sounds   []  Patient Baseline [x]  No Wheeze good aeration []  Faint, scattered wheezing, good aeration []  Expiratory Wheezing and or moderately diminished []  Insp/Exp wheeze and/or very diminished []  Insp/Exp and/ or marked distress   Respiratory Rate   []  Patient Baseline [x]  Less than 20 []  Less than 20 []  20-25 []  Greater than 25 []  Greater than 25   Peak flow % of Pred or PB [x]  NA   []  Greater than 90%  []  81-90% []  71-80% []  Less than or equal to 70%  or unable to perform []  Unable due to Respiratory Distress   Dyspnea re []  Patient Baseline [x]  No SOB []  No SOB []  SOB on exertion []  SOB min activity []  At rest/acute   e FEV% Predicted       [x]  NA []  Above 69%  []  Unable []  Above 60-69%  []  Unable []  Above 50-59%  []  Unable []  Above 35-49%  []  Unable []  Less than 35%  []  Unable                 []  Hyperinflation Assessment  Score 1 2 3   CXR and Breath Sounds   []  Clear []  No atelectasis  Basilar aeration []  Atelectasis or absent basilar breath sounds   Incentive Spirometry Volume  (Per IBW)   []  Greater than or equal to 15ml/Kg []  less than 15ml/Kg []  less than 15ml/Kg   Surgery within last 2 weeks []  None or general   []  Abdominal or thoracic surgery  []  Abdominal or thoracic   Chronic Pulmonary Historyre []  No []  Yes []  Yes     []  Secretion Management Assessment  Score 1 2 3   Bilateral Breath Sounds   []  Occasional Rhonchi []  Scattered Rhonchi []  Course Rhonchi and/or poor aeration   Sputum    []  Small amount of thin secretions []  Moderate amount of viscous secretions []  Copius, Viscious Yellow/ Secretions   CXR as reported by physician []  clear  []  Unavailable []  Infiltrates and/or consolidation  []  Unavailable []  Mucus Plugging and or lobar consolidation  []  Unavailable   Cough []  Strong, productive cough []  Weak productive cough []  No cough or weak non-productive cough   Cele Bacon  6:28 AM                            FEMALE                                  MALE                            FEV1 Predicted Normal Values                        FEV1 Predicted Normal Values          Age                                     Height in Feet 439 468 496 [phone]   75 261 274 289 305 319 334 348 364 75 344 372 400 429 458 487 [phone]   80 253 266 282 296 312 327 342 356 80 335 362 390 419 448 [phone]

## 2021-02-07 NOTE — PROGRESS NOTES
Patient is uncooperative, unable to obtain nursing nursing history and assessment. Pt states: \"I'm tired, come back tomorrow. \"

## 2021-02-07 NOTE — ED NOTES
ED to inpatient nurses report    Chief Complaint   Patient presents with    Nausea & Vomiting     X 6 days      Present to ED from home  LOC: alert and orientated to name, place, date  Vital signs   Vitals:    02/07/21 0301 02/07/21 0318 02/07/21 0335 02/07/21 0345   BP: (!) 161/117      Pulse: 91 90 99 92   Resp: 14 18 27 21   Temp:       TempSrc:       SpO2: 99% 96% 97% 95%   Weight:       Height:          Oxygen Baseline Room Air    Current needs required None   LDAs:   Peripheral IV 02/07/21 Left Antecubital (Active)   Site Assessment Clean;Dry; Intact 02/07/21 0340   Line Status Blood return noted 02/07/21 0340   Dressing Status Clean;Dry; Intact 02/07/21 0340   Dressing Intervention New 02/07/21 0340     Mobility: Requires assistance * 1  Pending ED orders: None  Present condition: Stable  Code Status: [unfilled]   Consults:  [x]  Hospitalist  Completed  [x] yes [] no  [x]  Medicine  Completed  [x] yes [] No  []  Cardiology  Completed  [] yes [] No  []  GI   Completed  [] yes [] No  []  Neurology  Completed  [] yes [] No  []  Nephrology Completed  [] yes [] No  []  Vascular  Completed  [] yes [] No   []  Surgery  Completed  [] yes [] No   []  Urology  Completed  [] yes [] No   []  Plastics  Completed  [] yes [] No   []  ENT  Completed  [] yes [] No   []  Other                     Completed  [] yes [] No  Pertinent event(s) None  Pertinent event(s) none  Electronically signed by Virginie Mckeon on 2/7/2021 at 3:54 AM     Virginie Mckeon, [address] [place] [address]  02/07/21 8959

## 2021-02-07 NOTE — H&P
[address] [facility]  History & Physical Examination Note              Date:   2/7/2021  Patient name:  Suzanna Noble  Date of admission:  2/7/2021  1:24 AM  MRN:   [id number]  YOB: 1965    CHIEF COMPLAINT:       Chief Complaint   Patient presents with    Nausea & Vomiting     X 6 days     History Obtained From:  Patient and chart review. HPI:     The patient is a 50 y.o.  female with history of HTN, DM2, Depression, polysubstance abuse who presented to the ED with complaint of nausea/vomiting, and generalized body aches for 6 days. Patient stated her symptoms have been progressively worsening for the past 6 days and also has been having chest pain so she presented to the ER for further evaluation. Patient stated that she has not been taking her insulin for the past 6 days. Patient stated her insulin regimen was recently changed and she does not know how much insulin she is supposed to be taking. Patient was oriented x 3 however was slightly somnolent throughout interview and was requiring multiple prompts to obtain any history. Currently, she stated she does not have any chest pain, SOB, nausea, vomiting. In the ED, patient was afebrile, VSS. Labs significant for glucose 500's, bicarb 18, A. Gap 23, beta hydroxybutyrate 5.64, pH 7.314, Cr 1.32. Patient had also complained of chest pain and SOB and had elevated D-dimer so CT-PE was ordered. Patient admitted to the hospital for management of MARTIN likely 2/2 dehydration, hyperglycemia, and further evaluation of chest pain.     PAST MEDICAL HISTORY:        has a past medical history of Acid reflux, Acute cystitis with hematuria, Acute non-recurrent maxillary sinusitis, Asthma, Bipolar 1 disorder ([address] Bipolar disorder, mixed ([address] BMI 34.0-34.9,adult, Cannabis use disorder, severe, dependence ([address] Cerebrovascular accident (CVA) ([address] Chest pain, Chronic renal insufficiency, Cocaine abuse (NEURONTIN) 300 MG capsule TAKE 3 CAPSULES 900 MG BY MOUTH THREE TIMES DAILY 12/9/20 1/8/21  Britta Zuleta MD    MG capsule TAKE 1 CAPSULE BY MOUTH TWICE DAILY 12/9/20   Britta Zuleta MD   acetaminophen (TYLENOL) 325 MG tablet TAKE 2 TABLETS BY MOUTH EVERY 6 HOURS AS NEEDED FOR PAIN 12/9/20   Britta Zuleta MD   metFORMIN (GLUCOPHAGE) 1000 MG tablet TAKE 1 TABLET BY MOUTH DAILY WITH BREAKFAST 12/9/20   Britta Zuleta MD   traZODone (DESYREL) 150 MG tablet Take 1 tablet by mouth nightly 11/18/20   Britta Zuleta MD   omeprazole (PRILOSEC) 20 MG delayed release capsule Take 1 capsule by mouth Daily 11/18/20   Britta Zuleta MD   FREESTYLE LITE strip 1 each by Does not apply route 3 times daily 11/11/20   Britta Zuleta MD   FreeStyle Lancets MISC 1 each by Does not apply route 3 times daily 11/11/20   Britta Zuleta MD   Alcohol Swabs (ALCOHOL PREP) 70 % PADS 1 each by Does not apply route as needed (use as needed) Use_____times per day  Diagnosis: 250.0   Diabetes ___Insulin-Dependent___Non-Insulin Dependent 11/11/20   Britta Zuleta MD   venlafaxine (EFFEXOR XR) 75 MG extended release capsule  11/2/20   Historical Provider, MD   FLUoxetine (PROZAC) 10 MG capsule Take 60 mg by mouth daily    Historical Provider, MD   benzonatate (TESSALON) 200 MG capsule  8/17/20   Historical Provider, MD   celecoxib (CELEBREX) 200 MG capsule  10/10/20   Historical Provider, MD   albuterol sulfate  (90 Base) MCG/ACT inhaler Inhale 2 puffs into the lungs every 4 hours as needed for Wheezing 10/20/20 11/20/20  Britta Zuleta MD   FLOVENT  MCG/ACT inhaler Inhale 2 puffs into the lungs 2 times daily 10/20/20   Britta Zuleta MD   SITagliptin (JANUVIA) 100 MG tablet Take 1 tablet by mouth daily 10/20/20   Britta Zuleta MD   budesonide-formoterol (SYMBICORT) 80-4.5 MCG/ACT AERO Inhale 2 puffs into the lungs 2 times daily 10/20/20   Britta Zuleta MD   melatonin (RA MELATONIN) 3 MG TABS tablet Take 1 tablet by mouth daily 10/20/20   Megan Leung MD   citalopram (CELEXA) 40 MG tablet Take 1 tablet by mouth daily 9/12/17   Faina Cooney MD       ALLERGIES:      Bactrim [sulfamethoxazole-trimethoprim] and Adhesive tape    SOCIAL HISTORY:      reports that she has never smoked. She has never used smokeless tobacco. She reports previous alcohol use. She reports previous drug use. Drugs: Marijuana and Cocaine. REVIEW OF SYSTEMS:     Review of Systems   Constitutional: Negative for chills and fever. Respiratory: Negative for shortness of breath. Cardiovascular: Negative for chest pain (denied any current chest pain). Gastrointestinal: Positive for abdominal pain (generalized). Negative for diarrhea, nausea and vomiting. PHYSICAL EXAM:     Vitals:    02/07/21 0318 02/07/21 0335 02/07/21 0345 02/07/21 0405   BP:       Pulse: 90 99 92 91   Resp: 18 27 21 16   Temp:       TempSrc:       SpO2: 96% 97% 95% 97%   Weight:       Height:           No intake or output data in the 24 hours ending 02/07/21 0451    Physical Exam  Constitutional:       General: She is not in acute distress. Appearance: She is not ill-appearing or toxic-appearing. HENT:      Head: Normocephalic and atraumatic. Eyes:      Extraocular Movements: Extraocular movements intact. Neck:      Musculoskeletal: Normal range of motion. Cardiovascular:      Rate and Rhythm: Normal rate and regular rhythm. Pulses: Normal pulses. Heart sounds: Normal heart sounds. Pulmonary:      Effort: Pulmonary effort is normal. No respiratory distress. Breath sounds: Normal breath sounds. No wheezing. Abdominal:      General: There is no distension. Palpations: Abdomen is soft. Tenderness: There is abdominal tenderness (mild generalized TTP). There is no guarding. Musculoskeletal:      Right lower leg: No edema. Left lower leg: No edema. Skin:     General: Skin is warm and dry. Neurological:      General: No focal deficit present. Mental Status: She is oriented to person, place, and time.            DIAGNOSTICS:      Laboratory Testing:    Recent Results (from the past 24 hour(s))   Venous Blood Gas, POC    Collection Time: 02/07/21  1:53 AM   Result Value Ref Range    pH, Alex 7.314 (L) 7.320 - 7.430    pCO2, Alex 49.9 41.0 - 51.0 mm Hg    pO2, Alex 23.7 (L) 30.0 - 50.0 mm Hg    HCO3, Venous 25.4 22.0 - 29.0 mmol/L    Total CO2, Venous 27 23.0 - 30.0 mmol/L    Negative Base Excess, Alex 2 0.0 - 2.0    Positive Base Excess, Alex NOT REPORTED 0.0 - 3.0    O2 Sat, Alex 36 (L) 60.0 - 85.0 %    O2 Device/Flow/% NOT REPORTED     Danish Test NOT REPORTED     Sample Site NOT REPORTED     Mode NOT REPORTED     FIO2 NOT REPORTED     Pt Temp NOT REPORTED     POC pH Temp NOT REPORTED     POC pCO2 Temp NOT REPORTED mm Hg    POC pO2 Temp NOT REPORTED mm Hg   Hemoglobin and hematocrit, blood    Collection Time: 02/07/21  1:53 AM   Result Value Ref Range    POC Hemoglobin 14.9 12.0 - 16.0 g/dL    POC Hematocrit 44 36 - 46 %   Creatinine W/GFR Point of Care    Collection Time: 02/07/21  1:53 AM   Result Value Ref Range    POC Creatinine 1.37 (H) 0.51 - 1.19 mg/dL    GFR Comment 49 (L) >60 mL/min    GFR Non-African American 40 (L) >60 mL/min    GFR Comment         SODIUM (POC)    Collection Time: 02/07/21  1:53 AM   Result Value Ref Range    POC Sodium 130 (L) 138 - 146 mmol/L   POTASSIUM (POC)    Collection Time: 02/07/21  1:53 AM   Result Value Ref Range    POC Potassium 4.4 3.5 - 4.5 mmol/L   CHLORIDE (POC)    Collection Time: 02/07/21  1:53 AM   Result Value Ref Range    POC Chloride 96 (L) 98 - 107 mmol/L   CALCIUM, IONIC (POC)    Collection Time: 02/07/21  1:53 AM   Result Value Ref Range    POC Ionized Calcium 1.19 1.15 - 1.33 mmol/L   Lactic Acid, POC    Collection Time: 02/07/21  1:53 AM   Result Value Ref Range    POC Lactic Acid 1.78 (H) 0.56 - 1.39 mmol/L   POCT Glucose    Collection Time: 02/07/21  1:53 AM   Result Value Ref Range    POC Glucose 591 (HH) 74 - 100 mg/dL   Anion Gap (Calc) POC    Collection Time: 02/07/21  1:53 AM   Result Value Ref Range    Anion Gap 9 7 - 16 mmol/L   Troponin    Collection Time: 02/07/21  2:09 AM   Result Value Ref Range    Troponin, High Sensitivity 13 0 - 14 ng/L    Troponin T NOT REPORTED <0.03 ng/mL    Troponin Interp NOT REPORTED    CBC WITH AUTO DIFFERENTIAL    Collection Time: 02/07/21  2:09 AM   Result Value Ref Range    WBC 8.4 3.5 - 11.3 k/uL    RBC 3.89 (L) 3.95 - 5.11 m/uL    Hemoglobin 11.9 11.9 - 15.1 g/dL    Hematocrit 38.3 36.3 - 47.1 %    MCV 98.5 82.6 - 102.9 fL    MCH 30.6 25.2 - 33.5 pg    MCHC 31.1 28.4 - 34.8 g/dL    RDW 12.4 11.8 - 14.4 %    Platelets 025 985 - 710 k/uL    MPV 11.2 8.1 - 13.5 fL    NRBC Automated 0.0 0.0 per 100 WBC    Differential Type NOT REPORTED     Seg Neutrophils 65 36 - 65 %    Lymphocytes 22 (L) 24 - 43 %    Monocytes 10 3 - 12 %    Eosinophils % 1 1 - 4 %    Basophils 1 0 - 2 %    Immature Granulocytes 1 (H) 0 %    Segs Absolute 5.50 1.50 - 8.10 k/uL    Absolute Lymph # 1.80 1.10 - 3.70 k/uL    Absolute Mono # 0.85 0.10 - 1.20 k/uL    Absolute Eos # 0.10 0.00 - 0.44 k/uL    Basophils Absolute 0.06 0.00 - 0.20 k/uL    Absolute Immature Granulocyte 0.05 0.00 - 0.30 k/uL    WBC Morphology NOT REPORTED     RBC Morphology NOT REPORTED     Platelet Estimate NOT REPORTED    Comprehensive Metabolic Panel    Collection Time: 02/07/21  2:09 AM   Result Value Ref Range    Glucose 538 (HH) 70 - 99 mg/dL    BUN 19 6 - 20 mg/dL    CREATININE 1.32 (H) 0.50 - 0.90 mg/dL    Bun/Cre Ratio NOT REPORTED 9 - 20    Calcium 9.4 8.6 - 10.4 mg/dL    Sodium 132 (L) 135 - 144 mmol/L    Potassium 4.6 3.7 - 5.3 mmol/L    Chloride 91 (L) 98 - 107 mmol/L    CO2 18 (L) 20 - 31 mmol/L    Anion Gap 23 (H) 9 - 17 mmol/L    Alkaline Phosphatase 144 (H) 35 - 104 U/L    ALT 17 5 - 33 U/L    AST 13 <32 U/L    Total Bilirubin 0.30 0.3 - 1.2 mg/dL    Total Protein 8.1 6.4 - 8.3 g/dL    Albumin 3.8 3.5 - 5.2 g/dL    Albumin/Globulin Ratio 0.9 (L) 1.0 - 2.5    GFR Non-African American 42 (L) >60 mL/min    GFR  51 (L) >60 mL/min    GFR Comment          GFR Staging NOT REPORTED    HCG Qualitative, Serum    Collection Time: 02/07/21  2:09 AM   Result Value Ref Range    hCG Qual NEGATIVE NEGATIVE   LIPASE    Collection Time: 02/07/21  2:09 AM   Result Value Ref Range    Lipase 80 (H) 13 - 60 U/L   BETA-HYDROXYBUTYRATE    Collection Time: 02/07/21  2:09 AM   Result Value Ref Range    Beta-Hydroxybutyrate 5.64 (H) 0.02 - 0.27 mmol/L   D-DIMER, QUANTITATIVE    Collection Time: 02/07/21  2:12 AM   Result Value Ref Range    D-Dimer, Quant 1.18 mg/L FEU   POC Glucose Fingerstick    Collection Time: 02/07/21  4:25 AM   Result Value Ref Range    POC Glucose 408 (HH) 65 - 105 mg/dL   POC Glucose Fingerstick    Collection Time: 02/07/21  4:44 AM   Result Value Ref Range    POC Glucose 409 (HH) 65 - 105 mg/dL         Imaging/Diagonstics:  Xr Chest Portable    Result Date: 2/7/2021  EXAMINATION: ONE XRAY VIEW OF THE CHEST 2/7/2021 2:02 am COMPARISON: Chest portable October 29, 2020. HISTORY: ORDERING SYSTEM PROVIDED HISTORY: assess pna TECHNOLOGIST PROVIDED HISTORY: assess pna Reason for Exam: upr,n/v 6 days FINDINGS: The heart is normal in size and configuration. The mediastinal contours are within normal limits. Suspected peripheral right lower lung lobe airspace disease is noted. Lungs are otherwise well aerated. The pleural surfaces are normal and no evidence of a pleural effusion is seen. Bones and soft tissues are unremarkable. Suspected peripheral right lower lung airspace disease. ASSESSMENT:       Principal Problem:    Acute kidney injury ([address]  Active Problems:    Hyperglycemia    Chest pain    Type 2 diabetes mellitus ([address]  Resolved Problems:    * No resolved hospital problems.  *      PLAN:     Patient status: Admit the patient as Inpatient Med/Surge Unit    Hyperglycemia in the setting of DM2 2/2 noncompliance with insulin  - Glucose 500's, elevated betahydroxybutyrate  - likely not in DKA, bicarb 18, pH 7.314, monitor BMP  - s/p lantus 8U in the ER   - IVF bolus in ER, continue IVF  cc/hr  - lantus 10U nightly  - medium dose ISS  - hypoglycemia protocol  - POCT glucose  - carb control diet  - last A1c 10 on 1/13/21  - holding oral antihyperglycemic home meds    MARTIN likely 2/2 hypovolemia  - Cr 1.32 on presentation, trend BMP  - calculate FeNa  - continue IVF  cc/hr  - f/u UA, U tox    Chest pain  - stated currently does not have chest pain  - elevated D-dimer  - CT PE unremarkable  - trop x 1 negative, trend trops  - f/u U tox, hx of cocaine use  - recent stress test on 10/29/2020: Low risk, EF 57%    Asthma  - stable, sat'ing well on RA  - continue home meds Symbicort  - RT eval  - aerosols    DVT prophylaxis: Lovenox 40 mg SC  GI prophylaxis: Famotidine 20 mg BID      Consultations:   Consults: IP CONSULT TO HOSPITALIST  IP CONSULT TO FAMILY MEDICINE  IP CONSULT TO SOCIAL WORK  PT/OT     The severity of this patient's signs and symptoms (specify nausea, vomiting, generalized body aches, chest pain) indicate the need for an inpatient admission.       Above plan discussed with the patient in room, who agreed to the above plan     Plan will be discussed with the attending, Dr. Maridee Curling, MD  Family Medicine Resident  2/7/2021 4:51 AM

## 2021-02-07 NOTE — ED NOTES
Report given to [facility], all questions answered.  Pt to be transported to the floor after CTA     Giselle Casillas RN  02/07/21 5759

## 2021-02-07 NOTE — PROGRESS NOTES
Progress Note  Date:2021       Room:[facility]6/0436-01  Patient Name:Terri Palmer     YOB: 1965     Age:55 y.o. Subjective    Subjective:  Symptoms:  Improved. She reports malaise, weakness and anorexia. Diet:  Poor intake. Activity level: Returning to normal.    Pain:  She reports no pain. Review of Systems   Constitutional: Negative for fever. Gastrointestinal: Positive for anorexia. Neurological: Positive for weakness. Objective         Vitals Last 24 Hours:  TEMPERATURE:  Temp  Av.2 °F (36.8 °C)  Min: 97.9 °F (36.6 °C)  Max: 98.7 °F (37.1 °C)  RESPIRATIONS RANGE: Resp  Av.3  Min: 14  Max: 27  PULSE OXIMETRY RANGE: SpO2  Av.5 %  Min: 95 %  Max: 100 %  PULSE RANGE: Pulse  Av.6  Min: 90  Max: 100  BLOOD PRESSURE RANGE: Systolic (02NXB), CMQ:048 , Min:107 , ASO:183   ; Diastolic (79OPX), GSY:70, Min:65, Max:117    I/O (24Hr): Intake/Output Summary (Last 24 hours) at 2021 1105  Last data filed at 2021 0444  Gross per 24 hour   Intake --   Output 750 ml   Net -750 ml     Objective:  General Appearance: In no acute distress. Vital signs: (most recent): Blood pressure 125/65, pulse 98, temperature 98.7 °F (37.1 °C), temperature source Oral, resp. rate 18, height 5' 6\" (1.676 m), weight 237 lb (107.5 kg), SpO2 100 %. Vital signs are normal.  No fever. Output: Producing urine and producing stool. Lungs:  Normal effort. No stridor. There are decreased breath sounds. No rales or wheezes. Heart: Normal rate. Regular rhythm. S1 normal and S2 normal.  No murmur, gallop or friction rub. Abdomen: Abdomen is soft. Neurological: Patient is alert and oriented to person, place and time.       Labs/Imaging/Diagnostics    Labs:  CBC:  Recent Labs     21  0209   WBC 8.4   RBC 3.89*   HGB 11.9   HCT 38.3   MCV 98.5   RDW 12.4        CHEMISTRIES:  Recent Labs     21  0153 21  0209   NA  --  132*   K  --  4.6   CL  -- 91*   CO2  --  18*   BUN  --  19   CREATININE 1.37* 1.32*   GLUCOSE  --  538*     PT/INR:No results for input(s): PROTIME, INR in the last 72 hours. APTT:No results for input(s): APTT in the last 72 hours. LIVER PROFILE:  Recent Labs     02/07/21  0209   AST 13   ALT 17   BILITOT 0.30   ALKPHOS 144*       Imaging Last 24 Hours:  Xr Chest Portable    Result Date: 2/7/2021  EXAMINATION: ONE XRAY VIEW OF THE CHEST 2/7/2021 2:02 am COMPARISON: Chest portable October 29, 2020. HISTORY: ORDERING SYSTEM PROVIDED HISTORY: assess pna TECHNOLOGIST PROVIDED HISTORY: assess pna Reason for Exam: upr,n/v 6 days FINDINGS: The heart is normal in size and configuration. The mediastinal contours are within normal limits. Suspected peripheral right lower lung lobe airspace disease is noted. Lungs are otherwise well aerated. The pleural surfaces are normal and no evidence of a pleural effusion is seen. Bones and soft tissues are unremarkable. Suspected peripheral right lower lung airspace disease. Ct Chest Pulmonary Embolism W Contrast    Result Date: 2/7/2021  EXAMINATION: CTA OF THE CHEST 2/7/2021 2:59 am TECHNIQUE: CTA of the chest was performed after the administration of intravenous contrast.  Multiplanar reformatted images are provided for review. MIP images are provided for review. Dose modulation, iterative reconstruction, and/or weight based adjustment of the mA/kV was utilized to reduce the radiation dose to as low as reasonably achievable. COMPARISON: Chest portable February 7, 2021. HISTORY: ORDERING SYSTEM PROVIDED HISTORY: assess for PE TECHNOLOGIST PROVIDED HISTORY: assess for PE Decision Support Exception->Emergency Medical Condition (MA) Reason for Exam: elevated d dimer Acuity: Acute Type of Exam: Initial FINDINGS: Pulmonary Arteries: Study is limited by patient breathing motion related artifact. Pulmonary arteries are adequately opacified for evaluation.   No definitive evidence of intraluminal filling defect to suggest pulmonary embolism. Main pulmonary artery is normal in caliber. Mediastinum: No evidence of mediastinal lymphadenopathy. The heart is moderately enlarged with otherwise unremarkable configuration. No evidence of pericardial effusion is identified. There is no acute abnormality of the thoracic aorta. Lungs/pleura: The lungs are without acute process. No focal consolidation or pulmonary edema. No evidence of pleural effusion or pneumothorax. Upper Abdomen: Limited images of the upper abdomen are unremarkable. Soft Tissues/Bones: No acute bone or soft tissue abnormality. 1. Note: Study limited by patient breathing motion related artifact. 2. No definitive evidence of acute pulmonary embolism. 3. Moderate cardiomegaly. Assessment//Plan           Hospital Problems           Last Modified POA    * (Principal) Acute kidney injury ([facility] [address] 2/7/2021 Yes    Hyperglycemia 2/7/2021 Yes    Chest pain 2/7/2021 Yes    Type 2 diabetes mellitus ([facility] [address] 2/7/2021 Yes    Overview Signed 6/5/2019 11:32 AM by Christian Rosenbaum     Last Assessment & Plan:   -AC/HS accuchecks  -cont home metformin and actos  -nutrition consult  -A1C 10.8, follow up with PCP  Last Assessment & Plan:   · For diabetes management  · Glucometer today is 133  · Patient did not bring in her blood sugar diary however she states that her blood sugars have been ranging in the low to mid 100s. Sometimes as high as 200. · States she will not be taking the Actos because she cannot afford it  · Has been taking the Metformin 5 mg twice a day and denies any side effects  · States she has been following a carbohydrate controlled diet  · Admits to polydipsia and polyuria however denies polyphagia. Denies hypoglycemia. · Is able to state signs and symptoms of hypoglycemia and treatment  · Up with ARNP in 2 months or call sooner with any questions or concerns             Assessment:    Condition: In serious condition.    (Hyperglycemia due to Noncompliance. Acute Kidney Injury. Dehydration. Chest pain. Cocaine Abuse. ). Plan:   Transfer to floor. Ad william activity. Consults: . Advance diet as tolerated. (Started on her Regular Insulin and covering with the sliding scale. Clear Liquids and advance diet as Tolerated. Continue IV Fluids at 100 ml/Hour. Chest pain because of use of Cocaine and trending troponins and EKG. Hopefully Discharge planning for Tomorrow if doing good and issues resolved. Phani Brown. ).        Electronically signed by Phani Brown MD on 2/7/21 at 11:05 AM EST

## 2021-02-07 NOTE — PLAN OF CARE
Problem: Health Behavior:  Goal: Ability to identify and utilize available support systems will improve  Description: Ability to identify and utilize available support systems will improve  2/7/2021 1300 by Juliet Lowery RN  Outcome: Ongoing     Problem: Discharge Planning:  Goal: Discharged to appropriate level of care  Description: Discharged to appropriate level of care  Outcome: Ongoing

## 2021-02-07 NOTE — ED PROVIDER NOTES
([facility] Migraine, Neuropathy, Neuropathy, Polysubstance abuse ([facility] Post traumatic stress disorder (PTSD), Posttraumatic stress disorder, Recurrent depression ([facility] Recurrent major depressive disorder, in partial remission ([facility] Screening mammogram, encounter for, Severe recurrent major depression with psychotic features ([facility] Severe recurrent major depression without psychotic features ([facility] Stroke (cerebrum) ([facility] Stroke ([facility] Suicidal ideation, Suicidal intent, Vitamin D deficiency, and White matter changes. has a past surgical history that includes Abscess Drainage; chest tube insertion; Abdomen surgery; Cataract removal with implant (Bilateral); and LASIK (Bilateral).     Social History     Socioeconomic History    Marital status: Legally      Spouse name: Not on file    Number of children: Not on file    Years of education: Not on file    Highest education level: Not on file   Occupational History    Not on file   Social Needs    Financial resource strain: Not on file    Food insecurity     Worry: Never true     Inability: Never true    Transportation needs     Medical: No     Non-medical: No   Tobacco Use    Smoking status: Never Smoker    Smokeless tobacco: Never Used   Substance and Sexual Activity    Alcohol use: Not Currently    Drug use: Not Currently     Types: Marijuana, Cocaine    Sexual activity: Not Currently     Partners: Male   Lifestyle    Physical activity     Days per week: Not on file     Minutes per session: Not on file    Stress: Not on file   Relationships    Social connections     Talks on phone: Not on file     Gets together: Not on file     Attends Yazdanism service: Not on file     Active member of club or organization: Not on file     Attends meetings of clubs or organizations: Not on file     Relationship status: Not on file    Intimate partner violence     Fear of current or ex partner: Not on file     Emotionally abused: Not on file each by Does not apply route 3 times daily 11/11/20   Enrique Randall MD   FreeStyle Lancets MISC 1 each by Does not apply route 3 times daily 11/11/20   Enrique Randall MD   Alcohol Swabs (ALCOHOL PREP) 70 % PADS 1 each by Does not apply route as needed (use as needed) Use_____times per day  Diagnosis: 250.0   Diabetes ___Insulin-Dependent___Non-Insulin Dependent 11/11/20   Enrique Randall MD   venlafaxine (EFFEXOR XR) 75 MG extended release capsule  11/2/20   Historical Provider, MD   FLUoxetine (PROZAC) 10 MG capsule Take 60 mg by mouth daily    Historical Provider, MD   benzonatate (TESSALON) 200 MG capsule  8/17/20   Historical Provider, MD   celecoxib (CELEBREX) 200 MG capsule  10/10/20   Historical Provider, MD   albuterol sulfate  (90 Base) MCG/ACT inhaler Inhale 2 puffs into the lungs every 4 hours as needed for Wheezing 10/20/20 11/20/20  Enrique Randall MD   FLOVENT  MCG/ACT inhaler Inhale 2 puffs into the lungs 2 times daily 10/20/20   Enrique Randall MD   SITagliptin (JANUVIA) 100 MG tablet Take 1 tablet by mouth daily 10/20/20   Enrique Randall MD   budesonide-formoterol (SYMBICORT) 80-4.5 MCG/ACT AERO Inhale 2 puffs into the lungs 2 times daily 10/20/20   Enrique Randall MD   melatonin (RA MELATONIN) 3 MG TABS tablet Take 1 tablet by mouth daily 10/20/20   Enrique Randall MD   citalopram (CELEXA) 40 MG tablet Take 1 tablet by mouth daily 9/12/17   Jos Rob MD       REVIEW OF SYSTEMS    (2-9 systems for level 4, 10 or more for level 5)      General ROS - No fevers, No chills, no gradual weight loss, no night sweats  Ophthalmic ROS - No discharge, No changes in vision  ENT ROS -  No sore throat, No rhinorrhea,   Respiratory ROS - no shortness of breath, no cough, no  wheezing  Cardiovascular ROS -positive chest pain, no dyspnea on exertion  Gastrointestinal ROS - positive abdominal pain, positive nausea, positive vomiting, no change in bowel habits, no black or bloody stools  Genito-Urinary ROS - No dysuria, trouble voiding, or hematuria  Musculoskeletal ROS - positive myalgias, No arthalgias  Neurological ROS - No headache, no dizziness/lightheadedness, No focal weakness, no loss of sensation  Dermatological ROS - No lesions, No rash         PHYSICAL EXAM   (up to 7 for level 4, 8 or more for level 5)      INITIAL VITALS:   /76   Pulse 97   Temp 98.2 °F (36.8 °C)   Resp 16   Ht 5' 6\" (1.676 m)   Wt 237 lb (107.5 kg)   LMP  (LMP Unknown)   SpO2 100%   BMI 38.25 kg/m²     General Appearance: Well-appearing, in no acute distress  HEENT: Head: normocephalic/atraumatic eyes: PERRLA, EOMT, conjunctiva not injected, sclerae nonicteric ears: External canals patent nose: Nares patent, no rhinorrhea, throat:mucous membranes moist, oropharynx dry     Neck: Trachea midline, no JVD. Lungs: No evidence of increased work of breathing. CTA B/L, no wheezes/rhonchi     Cardiovascular: RRR, no murmur, 2+ peripheral pulses bilaterally. Cap refill less than 2 seconds. No lower extremity edema noted    Abdomen: Soft, nontender. No guarding or rebound tenderness. Neurologic: TRAORE  to person, place, time, and event. No sensation deficits. Moving all extremities    Extremities: Skin warm, dry and intact.       DIFFERENTIAL  DIAGNOSIS     PLAN (LABS / IMAGING / EKG):  Orders Placed This Encounter   Procedures    XR CHEST PORTABLE    CT CHEST PULMONARY EMBOLISM W CONTRAST    Urinalysis Reflex to Culture    Troponin    CBC WITH AUTO DIFFERENTIAL    Comprehensive Metabolic Panel    HCG Qualitative, Serum    LIPASE    BETA-HYDROXYBUTYRATE    Hemoglobin and hematocrit, blood    SODIUM (POC)    POTASSIUM (POC)    CHLORIDE (POC)    CALCIUM, IONIC (POC)    D-DIMER, QUANTITATIVE    Basic Metabolic Panel w/ Reflex to MG    CBC    DRUG SCREEN MULTI URINE    Troponin    SODIUM, URINE, RANDOM    CREATININE, RANDOM URINE    DIET CARB CONTROL;    Vital signs per unit routine    Notify physician    Up as tolerated    Daily weights    Intake and output    Monitor for signs/symptoms of urinary retention    HYPOGLYCEMIA TREATMENT: blood glucose less than 50 mg/dL and patient  ALERT and TOLERATING PO    HYPOGLYCEMIA TREATMENT: blood glucose less than 70 mg/dL and patient ALERT and TOLERATING PO    HYPOGLYCEMIA TREATMENT: blood glucose less than 70 mg/dL and patient NOT ALERT or NPO    Full Code    Inpatient consult to Hospitalist    Inpatient consult to [facility]    Inpatient consult to Social Work    OT eval and treat    PT evaluation and treat    Initiate Oxygen Therapy Protocol    Initiate RT Protocol    Respiratory care evaluation only    HHN Treatment    POC Blood Gas and Chemistry    POCT glucose    Venous Blood Gas, POC    Creatinine W/GFR Point of Care    Lactic Acid, POC    POCT Glucose    Anion Gap (Calc) POC    POCT Glucose    POCT glucose    POC Glucose Fingerstick    POC Glucose Fingerstick    EKG 12 Lead    PATIENT STATUS (FROM ED OR OR/PROCEDURAL) Inpatient       MEDICATIONS ORDERED:  Orders Placed This Encounter   Medications    0.9 % sodium chloride bolus    ondansetron (ZOFRAN) injection 4 mg    dicyclomine (BENTYL) injection 20 mg    famotidine (PEPCID) injection 20 mg    insulin glargine (LANTUS) injection vial 8 Units    iopamidol (ISOVUE-370) 76 % injection 90 mL    0.9 % sodium chloride bolus    DISCONTD: glucose (GLUTOSE) 40 % oral gel 15 g    DISCONTD: dextrose 50 % IV solution    DISCONTD: glucagon (rDNA) injection 1 mg    DISCONTD: dextrose 5 % solution    DISCONTD: potassium chloride 40 mEq in lactated ringers 1,000 mL infusion    sodium chloride flush 0.9 % injection 10 mL    sodium chloride flush 0.9 % injection 10 mL    enoxaparin (LOVENOX) injection 40 mg    OR Linked Order Group     promethazine (PHENERGAN) tablet 12.5 mg     ondansetron (ZOFRAN) injection 4 mg    polyethylene glycol [facility]) packet 17 g    OR Linked Order Group     acetaminophen (TYLENOL) tablet 650 mg     acetaminophen (TYLENOL) suppository 650 mg    OR Linked Order Group     potassium chloride (KLOR-CON M) extended release tablet 40 mEq     potassium bicarb-citric acid (EFFER-K) effervescent tablet 40 mEq     potassium chloride 10 mEq/100 mL IVPB (Peripheral Line)    magnesium sulfate 2000 mg in 50 mL IVPB premix    famotidine (PEPCID) tablet 20 mg    glucose (GLUTOSE) 40 % oral gel 15 g    dextrose 50 % IV solution    glucagon (rDNA) injection 1 mg    dextrose 5 % solution    insulin lispro (HUMALOG) injection vial 0-12 Units    insulin lispro (HUMALOG) injection vial 0-6 Units    insulin glargine (LANTUS) injection vial 10 Units    ARIPiprazole (ABILIFY) tablet 10 mg    budesonide-formoterol (SYMBICORT) 80-4.5 MCG/ACT inhaler 2 puff    venlafaxine (EFFEXOR XR) extended release capsule 75 mg    albuterol (PROVENTIL) nebulizer solution 2.5 mg           DIAGNOSTIC RESULTS / EMERGENCY DEPARTMENT COURSE / MDM     LABS:  Results for orders placed or performed during the hospital encounter of 02/07/21   Troponin   Result Value Ref Range    Troponin, High Sensitivity 13 0 - 14 ng/L    Troponin T NOT REPORTED <0.03 ng/mL    Troponin Interp NOT REPORTED    CBC WITH AUTO DIFFERENTIAL   Result Value Ref Range    WBC 8.4 3.5 - 11.3 k/uL    RBC 3.89 (L) 3.95 - 5.11 m/uL    Hemoglobin 11.9 11.9 - 15.1 g/dL    Hematocrit 38.3 36.3 - 47.1 %    MCV 98.5 82.6 - 102.9 fL    MCH 30.6 25.2 - 33.5 pg    MCHC 31.1 28.4 - 34.8 g/dL    RDW 12.4 11.8 - 14.4 %    Platelets 936 275 - 169 k/uL    MPV 11.2 8.1 - 13.5 fL    NRBC Automated 0.0 0.0 per 100 WBC    Differential Type NOT REPORTED     Seg Neutrophils 65 36 - 65 %    Lymphocytes 22 (L) 24 - 43 %    Monocytes 10 3 - 12 %    Eosinophils % 1 1 - 4 %    Basophils 1 0 - 2 %    Immature Granulocytes 1 (H) 0 %    Segs Absolute 5.50 1.50 - 8.10 k/uL    Absolute Lymph # 1.80 1.10 - 3.70 k/uL    Absolute Mono # 0.85 0.10 - 1.20 k/uL    Absolute Eos # 0.10 0.00 - 0.44 k/uL    Basophils Absolute 0.06 0.00 - 0.20 k/uL    Absolute Immature Granulocyte 0.05 0.00 - 0.30 k/uL    WBC Morphology NOT REPORTED     RBC Morphology NOT REPORTED     Platelet Estimate NOT REPORTED    Comprehensive Metabolic Panel   Result Value Ref Range    Glucose 538 (HH) 70 - 99 mg/dL    BUN 19 6 - 20 mg/dL    CREATININE 1.32 (H) 0.50 - 0.90 mg/dL    Bun/Cre Ratio NOT REPORTED 9 - 20    Calcium 9.4 8.6 - 10.4 mg/dL    Sodium 132 (L) 135 - 144 mmol/L    Potassium 4.6 3.7 - 5.3 mmol/L    Chloride 91 (L) 98 - 107 mmol/L    CO2 18 (L) 20 - 31 mmol/L    Anion Gap 23 (H) 9 - 17 mmol/L    Alkaline Phosphatase 144 (H) 35 - 104 U/L    ALT 17 5 - 33 U/L    AST 13 <32 U/L    Total Bilirubin 0.30 0.3 - 1.2 mg/dL    Total Protein 8.1 6.4 - 8.3 g/dL    Albumin 3.8 3.5 - 5.2 g/dL    Albumin/Globulin Ratio 0.9 (L) 1.0 - 2.5    GFR Non-African American 42 (L) >60 mL/min    GFR  51 (L) >60 mL/min    GFR Comment          GFR Staging NOT REPORTED    HCG Qualitative, Serum   Result Value Ref Range    hCG Qual NEGATIVE NEGATIVE   LIPASE   Result Value Ref Range    Lipase 80 (H) 13 - 60 U/L   BETA-HYDROXYBUTYRATE   Result Value Ref Range    Beta-Hydroxybutyrate 5.64 (H) 0.02 - 0.27 mmol/L   Hemoglobin and hematocrit, blood   Result Value Ref Range    POC Hemoglobin 14.9 12.0 - 16.0 g/dL    POC Hematocrit 44 36 - 46 %   SODIUM (POC)   Result Value Ref Range    POC Sodium 130 (L) 138 - 146 mmol/L   POTASSIUM (POC)   Result Value Ref Range    POC Potassium 4.4 3.5 - 4.5 mmol/L   CHLORIDE (POC)   Result Value Ref Range    POC Chloride 96 (L) 98 - 107 mmol/L   CALCIUM, IONIC (POC)   Result Value Ref Range    POC Ionized Calcium 1.19 1.15 - 1.33 mmol/L   D-DIMER, QUANTITATIVE   Result Value Ref Range    D-Dimer, Quant 1.18 mg/L FEU   Venous Blood Gas, POC   Result Value Ref Range    pH, Alex 7.314 (L) 7.320 - 7.430    pCO2, Alex 49.9 41.0 - 51.0 mm Hg    pO2, Alex 23.7 (L) 30.0 - 50.0 mm Hg    HCO3, Venous 25.4 22.0 - 29.0 mmol/L    Total CO2, Venous 27 23.0 - 30.0 mmol/L    Negative Base Excess, Alex 2 0.0 - 2.0    Positive Base Excess, Alex NOT REPORTED 0.0 - 3.0    O2 Sat, Alex 36 (L) 60.0 - 85.0 %    O2 Device/Flow/% NOT REPORTED     Danish Test NOT REPORTED     Sample Site NOT REPORTED     Mode NOT REPORTED     FIO2 NOT REPORTED     Pt Temp NOT REPORTED     POC pH Temp NOT REPORTED     POC pCO2 Temp NOT REPORTED mm Hg    POC pO2 Temp NOT REPORTED mm Hg   Creatinine W/GFR Point of Care   Result Value Ref Range    POC Creatinine 1.37 (H) 0.51 - 1.19 mg/dL    GFR Comment 49 (L) >60 mL/min    GFR Non-African American 40 (L) >60 mL/min    GFR Comment         Lactic Acid, POC   Result Value Ref Range    POC Lactic Acid 1.78 (H) 0.56 - 1.39 mmol/L   POCT Glucose   Result Value Ref Range    POC Glucose 591 (HH) 74 - 100 mg/dL   Anion Gap (Calc) POC   Result Value Ref Range    Anion Gap 9 7 - 16 mmol/L   POC Glucose Fingerstick   Result Value Ref Range    POC Glucose 408 (HH) 65 - 105 mg/dL   POC Glucose Fingerstick   Result Value Ref Range    POC Glucose 409 (HH) 65 - 105 mg/dL           RADIOLOGY:  No results found. EKG  EKG shows sinus rhythm rate 98 normal axis, Q waves unchanged in leads III hyperacute T waves in V2 otherwise no T wave or ST abnormalities from previous EKG QTc 477    All EKG's are interpreted by the Emergency Department Physician who either signs or Co-signs this chart in the absence of a cardiologist.    EMERGENCY DEPARTMENT COURSE/IMPRESSION:    Patient with bipolar disorder, history of gastritis, cocaine abuse, insulin-dependent diabetic states she has been taking her insulin for an unknown amount of time and has been checking her blood sugar. She states she is just generalized abdominal pain nausea decreased oral intake. She denies any chest pain or shortness of breath, no syncope or falls or trauma.   She has no dysuria, she did have blood-streaked vomit today. She denies any vaginal discomfort vaginal bleeding no rectal bleeding or dark stools no diarrhea. No fevers chills cough    Patient is stable does not appear toxic, vitals are otherwise appropriate except for mild increase in heart rate at 94. EKG does show an acute T wave in V2 otherwise no significant changes.   Will get DKA labs, cardiac work-up, fluids, symptomatic relief, check urine    Work-up negative likely discharge    Heart Score    Heart Score for chest pain patients  History: Slightly Suspicious  ECG: Non-Specifc repolarization disturbance/LBTB/PM  Patient Age: > 39 and < 65 years  *Risk factors for Atherosclerotic disease: Diabetes Mellitus, Hypertension, Cocaine abuse  Risk Factors: > 3 Risk factors or history of atherosclerotic disease*  Troponin: > 1 and < 3X normal limit  Heart Score Total: 5    Score 0 - 3 = 2.5%  MACE over next 6 wks = Discharge home  Score 4 - 6 = 20.3%  MACE over next 6 wks = Obs admit  Score 7 - 10 = 72.7%  MACE over next 6 wks = Early invasive Rx        ED Course as of Feb 07 0506   Sun Feb 07, 2021   0210 POC Glucose(!!): 591 [EF]   0210 pH, Alex(!): 7.314 [EF]   0216 Repeat questioning patient does admit to attending she has chest pain shortness of breath added D-dimer, more concerning now for cardiac etiology, possible labs admit if work-up negative for cardiology evaluation    [EF]   0246 Troponin, High Sensitivity: 13 [EF]   0247 D-Dimer, Quant: 1.18 [EF]   0247 hCG Qual: NEGATIVE [EF]   0256 Beta-Hydroxybutyrate(!): 5.64 [EF]   0256 CO2(!): 18 [EF]   0257 Lipase(!): 80 [EF]   0311 Spoke to [facility] they would like patient start insulin drip placed on insulin drip for repeat metabolic panel at 4 AM    [EF]   0312 Potassium: 4.6 [EF]   0321 Patient to [id number] [facility] practice will consult    [EF]      ED Course User Index  [EF] Raji Pyo, DO       PROCEDURES:  None    CONSULTS:  IP CONSULT TO HOSPITALIST  IP CONSULT TO FAMILY MEDICINE  IP CONSULT TO SOCIAL WORK    CRITICAL CARE:  None    FINAL IMPRESSION      1. Hyperglycemia    2. Metabolic acidosis    3. Chest pain, unspecified type    4. MARTIN (acute kidney injury) ([facility]          DISPOSITION / PLAN     DISPOSITION Admitted 02/07/2021 03:45:05 AM      PATIENT REFERRED TO:  No follow-up provider specified. DISCHARGE MEDICATIONS:  Current Discharge Medication List          DO Steven Ludwig D.O.   Emergency Medicine Resident    (Please note that portions of this note were completed with a voice recognition program.  Efforts were made to edit the dictations but occasionally words aremis-transcribed.)       Basilia Garcia DO  Resident  02/07/21 [id number]

## 2021-02-07 NOTE — PLAN OF CARE
Problem: Health Behavior:  Goal: Ability to identify and utilize available support systems will improve  Description: Ability to identify and utilize available support systems will improve  Outcome: Ongoing  Goal: Compliance with therapeutic regimen will improve  Description: Compliance with therapeutic regimen will improve  Outcome: Ongoing  Goal: Ability to keep healthcare appointments will improve  Description: Ability to keep healthcare appointments will improve  Outcome: Ongoing

## 2021-02-07 NOTE — ED NOTES
Pt resting on stretcher with eyes closed. Family medicine resident at the bedside. Pt ready for CT and is currently waiting for a clean inpatient bed assignment.       Pretty Donovan RN  02/07/21 3452

## 2021-02-07 NOTE — DISCHARGE INSTR - COC
Continuity of Care Form    Patient Name: Lucinda Hurtado   :  1965  MRN:  [id number]    Admit date:  2021  Discharge date:  ***    Code Status Order: Full Code   Advance Directives:      Admitting Physician:  Radha Alexander MD  PCP: Chantale Ardon MD    Discharging Nurse: [facility] Unit/Room#: 9502/4035-79  Discharging Unit Phone Number: ***    Emergency Contact:   Extended Emergency Contact Information  Primary Emergency Contact: Leora Palmer  Address: NOT AVAILABLE   [address] Phone: [phone]  Relation: Child  Secondary Emergency Contact: Tommy Cordero   [address] Phone: [phone]  Relation: Spouse    Past Surgical History:  Past Surgical History:   Procedure Laterality Date    ABDOMEN SURGERY      drain tube    ABSCESS DRAINAGE      right buttock    CATARACT REMOVAL WITH IMPLANT Bilateral     CHEST TUBE INSERTION      LASIK Bilateral        Immunization History: There is no immunization history on file for this patient. Active Problems:  Patient Active Problem List   Diagnosis Code    IDDM (insulin dependent diabetes mellitus) [id number]    Lupus ([facility] M32.9    Bipolar disorder, mixed ([facility] F31.60    Post traumatic stress disorder (PTSD) F43.10    Acute cystitis with hematuria N30.01    Hyperglycemia R73.9    Suicidal ideation R45.851    Arthritis M19.90    Chronic back pain M54.9, G89.29    Acid reflux K21.9    History of stroke Z86.73    Polysubstance abuse ([facility] F19.10    Bipolar 1 disorder ([facility] F31.9    Cocaine abuse ([facility] F14.10    Chest pain R07.9    Acute non-recurrent maxillary sinusitis J01.00    Acute respiratory failure with hypercapnia (HCC) J96.02    Alcohol use disorder, severe, dependence (HCC) F10.20    Asthma exacerbation attacks J45. 901    Bilateral edema of lower extremity R60.0    Bipolar 1 disorder, depressed, severe ([facility]) F31.4    BMI 34.0-34.9,adult Z68.34    Cannabis use disorder, 2/8/2021    Intake/Output Summary (Last 24 hours) at 2/7/2021 1112  Last data filed at 2/7/2021 0444  Gross per 24 hour   Intake --   Output 750 ml   Net -750 ml     I/O last 3 completed shifts:  In: -   Out: 750 [Urine:750]    Safety Concerns: At Risk for Falls    Impairments/Disabilities:      None    Nutrition Therapy:  Current Nutrition Therapy:   Diabetic    Routes of Feeding: Oral  Liquids: No Restrictions  Daily Fluid Restriction: no  Last Modified Barium Swallow with Video (Video Swallowing Test): not done    Treatments at the Time of Hospital Discharge:   Respiratory Treatments: ***  Oxygen Therapy:  is not on home oxygen therapy. Ventilator:    - No ventilator support    Rehab Therapies: Physical Therapy, Occupational Therapy  Weight Bearing Status/Restrictions: No weight bearing restirctions  Other Medical Equipment (for information only, NOT a DME order):  bedside commode  Other Treatments: ***    Patient's personal belongings (please select all that are sent with patient):  None    RN SIGNATURE:  Electronically signed by Anahi Herndon RN on 2/9/21 at 3:42 PM EST    CASE MANAGEMENT/SOCIAL WORK SECTION    Inpatient Status Date: 2/7/2020    Readmission Risk Assessment Score:  Readmission Risk              Risk of Unplanned Readmission:        21           Discharging to Facility/ Agency   Name:   [address] [place] [address] Details  FAX            [facility] [address] [place] [address]       Phone: [phone]        ·   · Address:  · Phone:  · Fax:    Dialysis Facility (if applicable)   · Name:  · Address:  · Dialysis Schedule:  · Phone:  · Fax:    / signature: Electronically signed by Roland Buitrago RN on 2/9/21 at 2:37 PM EST    PHYSICIAN SECTION    Prognosis: Fair    Condition at Discharge: Stable    Rehab Potential (if transferring to Rehab):  Fair    Recommended Labs or Other Treatments After Discharge:     Physician Certification: I certify the above information and transfer of Heidi Smith Breanna Tyson  is necessary for the continuing treatment of the diagnosis listed and that she requires [place] for greater 30 days.      Update Admission H&P: No change in H&P    PHYSICIAN SIGNATURE:  Electronically signed by Deon Morrissey MD on 2/7/21 at 11:12 AM EST

## 2021-02-08 LAB
ANION GAP SERPL CALCULATED.3IONS-SCNC: 14 MMOL/L (ref 9–17)
BUN BLDV-MCNC: 10 MG/DL (ref 6–20)
BUN/CREAT BLD: ABNORMAL (ref 9–20)
CALCIUM SERPL-MCNC: 8.2 MG/DL (ref 8.6–10.4)
CHLORIDE BLD-SCNC: 105 MMOL/L (ref 98–107)
CO2: 21 MMOL/L (ref 20–31)
CREAT SERPL-MCNC: 0.83 MG/DL (ref 0.5–0.9)
EKG ATRIAL RATE: 98 BPM
EKG P AXIS: 71 DEGREES
EKG P-R INTERVAL: 132 MS
EKG Q-T INTERVAL: 374 MS
EKG QRS DURATION: 86 MS
EKG QTC CALCULATION (BAZETT): 477 MS
EKG R AXIS: -5 DEGREES
EKG T AXIS: 28 DEGREES
EKG VENTRICULAR RATE: 98 BPM
GFR AFRICAN AMERICAN: >60 ML/MIN
GFR NON-AFRICAN AMERICAN: >60 ML/MIN
GFR SERPL CREATININE-BSD FRML MDRD: ABNORMAL ML/MIN/{1.73_M2}
GFR SERPL CREATININE-BSD FRML MDRD: ABNORMAL ML/MIN/{1.73_M2}
GLUCOSE BLD-MCNC: 298 MG/DL (ref 65–105)
GLUCOSE BLD-MCNC: 319 MG/DL (ref 65–105)
GLUCOSE BLD-MCNC: 333 MG/DL (ref 70–99)
GLUCOSE BLD-MCNC: 348 MG/DL (ref 65–105)
GLUCOSE BLD-MCNC: 349 MG/DL (ref 65–105)
GLUCOSE BLD-MCNC: 407 MG/DL (ref 65–105)
HCT VFR BLD CALC: 34.4 % (ref 36.3–47.1)
HEMOGLOBIN: 10.6 G/DL (ref 11.9–15.1)
MCH RBC QN AUTO: 30.5 PG (ref 25.2–33.5)
MCHC RBC AUTO-ENTMCNC: 30.8 G/DL (ref 28.4–34.8)
MCV RBC AUTO: 99.1 FL (ref 82.6–102.9)
NRBC AUTOMATED: 0 PER 100 WBC
PDW BLD-RTO: 12.6 % (ref 11.8–14.4)
PLATELET # BLD: 328 K/UL (ref 138–453)
PMV BLD AUTO: 10.8 FL (ref 8.1–13.5)
POTASSIUM SERPL-SCNC: 4.1 MMOL/L (ref 3.7–5.3)
RBC # BLD: 3.47 M/UL (ref 3.95–5.11)
SODIUM BLD-SCNC: 140 MMOL/L (ref 135–144)
TROPONIN INTERP: NORMAL
TROPONIN T: NORMAL NG/ML
TROPONIN, HIGH SENSITIVITY: 12 NG/L (ref 0–14)
TROPONIN, HIGH SENSITIVITY: 12 NG/L (ref 0–14)
TROPONIN, HIGH SENSITIVITY: 13 NG/L (ref 0–14)
TROPONIN, HIGH SENSITIVITY: 13 NG/L (ref 0–14)
TROPONIN, HIGH SENSITIVITY: 14 NG/L (ref 0–14)
WBC # BLD: 6.4 K/UL (ref 3.5–11.3)

## 2021-02-08 PROCEDURE — 94761 N-INVAS EAR/PLS OXIMETRY MLT: CPT

## 2021-02-08 PROCEDURE — 97530 THERAPEUTIC ACTIVITIES: CPT

## 2021-02-08 PROCEDURE — 6370000000 HC RX 637 (ALT 250 FOR IP): Performed by: STUDENT IN AN ORGANIZED HEALTH CARE EDUCATION/TRAINING PROGRAM

## 2021-02-08 PROCEDURE — 85027 COMPLETE CBC AUTOMATED: CPT

## 2021-02-08 PROCEDURE — 94640 AIRWAY INHALATION TREATMENT: CPT

## 2021-02-08 PROCEDURE — 97162 PT EVAL MOD COMPLEX 30 MIN: CPT

## 2021-02-08 PROCEDURE — 93005 ELECTROCARDIOGRAM TRACING: CPT | Performed by: STUDENT IN AN ORGANIZED HEALTH CARE EDUCATION/TRAINING PROGRAM

## 2021-02-08 PROCEDURE — 93010 ELECTROCARDIOGRAM REPORT: CPT | Performed by: INTERNAL MEDICINE

## 2021-02-08 PROCEDURE — 36415 COLL VENOUS BLD VENIPUNCTURE: CPT

## 2021-02-08 PROCEDURE — 1200000000 HC SEMI PRIVATE

## 2021-02-08 PROCEDURE — 6360000002 HC RX W HCPCS: Performed by: STUDENT IN AN ORGANIZED HEALTH CARE EDUCATION/TRAINING PROGRAM

## 2021-02-08 PROCEDURE — 2580000003 HC RX 258: Performed by: STUDENT IN AN ORGANIZED HEALTH CARE EDUCATION/TRAINING PROGRAM

## 2021-02-08 PROCEDURE — 80048 BASIC METABOLIC PNL TOTAL CA: CPT

## 2021-02-08 PROCEDURE — 84484 ASSAY OF TROPONIN QUANT: CPT

## 2021-02-08 PROCEDURE — 99233 SBSQ HOSP IP/OBS HIGH 50: CPT | Performed by: FAMILY MEDICINE

## 2021-02-08 PROCEDURE — 82947 ASSAY GLUCOSE BLOOD QUANT: CPT

## 2021-02-08 RX ORDER — INSULIN GLARGINE 100 [IU]/ML
5 INJECTION, SOLUTION SUBCUTANEOUS ONCE
Status: COMPLETED | OUTPATIENT
Start: 2021-02-08 | End: 2021-02-08

## 2021-02-08 RX ORDER — GABAPENTIN 600 MG/1
300 TABLET ORAL 3 TIMES DAILY
Status: DISCONTINUED | OUTPATIENT
Start: 2021-02-08 | End: 2021-02-09

## 2021-02-08 RX ORDER — VENLAFAXINE HYDROCHLORIDE 150 MG/1
150 CAPSULE, EXTENDED RELEASE ORAL
Status: DISCONTINUED | OUTPATIENT
Start: 2021-02-09 | End: 2021-02-10 | Stop reason: HOSPADM

## 2021-02-08 RX ORDER — INSULIN GLARGINE 100 [IU]/ML
20 INJECTION, SOLUTION SUBCUTANEOUS NIGHTLY
Status: DISCONTINUED | OUTPATIENT
Start: 2021-02-08 | End: 2021-02-10 | Stop reason: HOSPADM

## 2021-02-08 RX ORDER — HYDROXYZINE 50 MG/1
50 TABLET, FILM COATED ORAL ONCE
Status: COMPLETED | OUTPATIENT
Start: 2021-02-08 | End: 2021-02-08

## 2021-02-08 RX ADMIN — INSULIN LISPRO 4 UNITS: 100 INJECTION, SOLUTION INTRAVENOUS; SUBCUTANEOUS at 20:49

## 2021-02-08 RX ADMIN — ACETAMINOPHEN 650 MG: 325 TABLET ORAL at 13:45

## 2021-02-08 RX ADMIN — ACETAMINOPHEN 650 MG: 325 TABLET ORAL at 06:16

## 2021-02-08 RX ADMIN — FAMOTIDINE 20 MG: 20 TABLET, FILM COATED ORAL at 20:48

## 2021-02-08 RX ADMIN — INSULIN GLARGINE 20 UNITS: 100 INJECTION, SOLUTION SUBCUTANEOUS at 20:49

## 2021-02-08 RX ADMIN — HYDROXYZINE HYDROCHLORIDE 50 MG: 50 TABLET ORAL at 18:42

## 2021-02-08 RX ADMIN — BUDESONIDE AND FORMOTEROL FUMARATE DIHYDRATE 2 PUFF: 80; 4.5 AEROSOL RESPIRATORY (INHALATION) at 10:06

## 2021-02-08 RX ADMIN — FAMOTIDINE 20 MG: 20 TABLET, FILM COATED ORAL at 09:04

## 2021-02-08 RX ADMIN — SODIUM CHLORIDE, PRESERVATIVE FREE 10 ML: 5 INJECTION INTRAVENOUS at 20:53

## 2021-02-08 RX ADMIN — INSULIN LISPRO 12 UNITS: 100 INJECTION, SOLUTION INTRAVENOUS; SUBCUTANEOUS at 12:00

## 2021-02-08 RX ADMIN — ACETAMINOPHEN 650 MG: 325 TABLET ORAL at 21:34

## 2021-02-08 RX ADMIN — INSULIN LISPRO 6 UNITS: 100 INJECTION, SOLUTION INTRAVENOUS; SUBCUTANEOUS at 09:03

## 2021-02-08 RX ADMIN — ARIPIPRAZOLE 10 MG: 10 TABLET ORAL at 09:04

## 2021-02-08 RX ADMIN — GABAPENTIN 300 MG: 600 TABLET ORAL at 13:45

## 2021-02-08 RX ADMIN — INSULIN LISPRO 8 UNITS: 100 INJECTION, SOLUTION INTRAVENOUS; SUBCUTANEOUS at 17:18

## 2021-02-08 RX ADMIN — BUDESONIDE AND FORMOTEROL FUMARATE DIHYDRATE 2 PUFF: 80; 4.5 AEROSOL RESPIRATORY (INHALATION) at 20:23

## 2021-02-08 RX ADMIN — GABAPENTIN 300 MG: 600 TABLET ORAL at 20:47

## 2021-02-08 RX ADMIN — VENLAFAXINE HYDROCHLORIDE 75 MG: 75 CAPSULE, EXTENDED RELEASE ORAL at 09:05

## 2021-02-08 RX ADMIN — INSULIN GLARGINE 5 UNITS: 100 INJECTION, SOLUTION SUBCUTANEOUS at 09:29

## 2021-02-08 ASSESSMENT — PAIN DESCRIPTION - LOCATION
LOCATION: LEG
LOCATION: GENERALIZED

## 2021-02-08 ASSESSMENT — PAIN DESCRIPTION - ORIENTATION: ORIENTATION: LEFT;RIGHT

## 2021-02-08 ASSESSMENT — PAIN SCALES - GENERAL
PAINLEVEL_OUTOF10: 8
PAINLEVEL_OUTOF10: 10
PAINLEVEL_OUTOF10: 6
PAINLEVEL_OUTOF10: 6

## 2021-02-08 ASSESSMENT — PAIN DESCRIPTION - DESCRIPTORS: DESCRIPTORS: DISCOMFORT

## 2021-02-08 ASSESSMENT — PAIN DESCRIPTION - PAIN TYPE: TYPE: ACUTE PAIN

## 2021-02-08 NOTE — PROGRESS NOTES
Physical Therapy    DATE: 2021    NAME: Sylvester Ruvalcaba  MRN: [id number]   : 1965      Patient not seen this date for Physical Therapy due to:    Patient Declined: pt politely declined due to bilateral LE pain, pt tearful throughout. PT will check back 21.        Electronically signed by Severa Peaches, PT on 2021 at 11:33 AM

## 2021-02-08 NOTE — PROGRESS NOTES
Case management just rounded with patient about SNF options and  referrals sent out, patient is stating that she is having anxiety now and is requesting anxiety medication, perfect serve sent to attending to notify

## 2021-02-08 NOTE — PROGRESS NOTES
[address] [facility]  Progress Note    Date:   2/8/2021  Patient name:  Virgen Garcia  Date of admission:  2/7/2021  1:24 AM  MRN:   [id number]  YOB: 1965    SUBJECTIVE/Last 24 hours update:     Patient was seen and examined at bedside. No acute events overnight. Patient states she has intermittent chest pain. Patient does not specify what type of pain she has. States the pain is located on the right side of her chest.  We will order a stat EKG  And stat troponin this a.m. Patient's MARTIN has resolved with a creatinine from 1.32 to 0.83 today    Patient denies fever, chills, S OB, palpitations, nausea, or vomiting. Notes from nursing staff and Consults had been reviewed, and the overnight progress had been checked with the nursing staff as well.     HPI:   Please see H&P    REVIEW OF SYSTEMS:      CONSTITUTIONAL:  no fevers, no fatigue   HEENT: No headaches, no blurry vision, no tinnitus, no nasal congestion, no difficulty swallowing  RESPIRATORY:negative for dyspnea, no wheezing, no Cough  CARDIOVASCULAR: no palpitations, chest pain  GASTROINTESTINAL: no nausea, no vomiting, no change in bowel habits, no abdominal pain   GENITOURINARY: negative for dysuria, no hematuria   MUSCULOSKELETAL: no joint pains, no muscle aches, no swelling of joints or extremities  NEUROLOGICAL: No  Weakness or numbness      PAST MEDICAL HISTORY:      has a past medical history of Acid reflux, Acute cystitis with hematuria, Acute non-recurrent maxillary sinusitis, Asthma, Bipolar 1 disorder ([address] Bipolar disorder, mixed ([address] BMI 34.0-34.9,adult, Cannabis use disorder, severe, dependence ([address] Cerebrovascular accident (CVA) ([address] Chest pain, Chronic renal insufficiency, Cocaine abuse ([address] DDD (degenerative disc disease), cervical, Diabetes mellitus ([address] Dizziness, Fibromyalgia, History of stroke, Homicidal ideation, Hyperglycemia, Hypertension, [facility] , MD   gabapentin (NEURONTIN) 300 MG capsule TAKE 3 CAPSULES 900 MG BY MOUTH THREE TIMES DAILY 12/9/20 1/8/21  [facility], MD    MG capsule TAKE 1 CAPSULE BY MOUTH TWICE DAILY 12/9/20   [facility], MD   acetaminophen (TYLENOL) 325 MG tablet TAKE 2 TABLETS BY MOUTH EVERY 6 HOURS AS NEEDED FOR PAIN 12/9/20   [facility], MD   metFORMIN (GLUCOPHAGE) 1000 MG tablet TAKE 1 TABLET BY MOUTH DAILY WITH BREAKFAST 12/9/20   [facility], MD   traZODone (DESYREL) 150 MG tablet Take 1 tablet by mouth nightly 11/18/20   [facility], MD   omeprazole (PRILOSEC) 20 MG delayed release capsule Take 1 capsule by mouth Daily 11/18/20   [facility], MD JOHNOSNSTYLE LITE strip 1 each by Does not apply route 3 times daily 11/11/20   [facility], MD Rodriguez Lancets MISC 1 each by Does not apply route 3 times daily 11/11/20   [facility], MD   Alcohol Swabs (ALCOHOL PREP) 70 % PADS 1 each by Does not apply route as needed (use as needed) Use_____times per day  Diagnosis: 250.0   Diabetes ___Insulin-Dependent___Non-Insulin Dependent 11/11/20   [facility], MD   venlafaxine (EFFEXOR XR) 75 MG extended release capsule  11/2/20   Historical Provider, MD   FLUoxetine (PROZAC) 10 MG capsule Take 60 mg by mouth daily    Historical Provider, MD   benzonatate (TESSALON) 200 MG capsule  8/17/20   Historical Provider, MD   celecoxib (CELEBREX) 200 MG capsule  10/10/20   Historical Provider, MD   albuterol sulfate  (90 Base) MCG/ACT inhaler Inhale 2 puffs into the lungs every 4 hours as needed for Wheezing 10/20/20 11/20/20  [facility], MD   FLOVENT  MCG/ACT inhaler Inhale 2 puffs into the lungs 2 times daily 10/20/20   [facility], MD   SITagliptin (JANUVIA) 100 MG tablet Take 1 tablet by mouth daily 10/20/20   [facility], MD   budesonide-formoterol (SYMBICORT) 80-4.5 MCG/ACT AERO Inhale 2 puffs into the lungs 2 times daily 10/20/20   Puma Chantell Sanchez MD   melatonin (RA MELATONIN) 3 MG TABS tablet Take 1 tablet by mouth daily 10/20/20   Mark Sierra MD   citalopram (CELEXA) 40 MG tablet Take 1 tablet by mouth daily 9/12/17   Kat Shaw MD       ALLERGIES:     Bactrim [sulfamethoxazole-trimethoprim] and Adhesive tape      OBJECTIVE:       Vitals:    02/07/21 1101 02/07/21 1552 02/07/21 2030 02/08/21 0741   BP: 125/65 (!) 128/56 (!) 120/59 (!) 116/58   Pulse: 98 101 97 76   Resp:  14 16 18   Temp: 98.7 °F (37.1 °C) 98.6 °F (37 °C) 98.7 °F (37.1 °C) 97.7 °F (36.5 °C)   TempSrc: Oral Axillary Oral Oral   SpO2: 100% 95% 100% 100%   Weight:       Height:             Intake/Output Summary (Last 24 hours) at 2/8/2021 0815  Last data filed at 2/8/2021 4526  Gross per 24 hour   Intake 2317 ml   Output 300 ml   Net 2017 ml       PHYSICAL EXAM:  General Appearance  Alert , awake , not in acute distress  HEENT - Head is normocephalic, atraumatic. Lungs - Bilateral equal air entry , no wheezes, rales or rhonchi, aeration good  Cardiovascular - Heart sounds are normal.  Regular rhythm, normal rate without murmur, gallop or rub.   Abdomen - Soft, nontender, nondistended, no masses or organomegaly  Neurologic - There are no new focal motor or sensory deficits  Skin - No bruising or bleeding on exposed skin area  Extremities - No cyanosis, clubbing or edema      DIAGNOSTICS:     Laboratory Testing:    Recent Results (from the past 24 hour(s))   POC Glucose Fingerstick    Collection Time: 02/07/21  9:22 AM   Result Value Ref Range    POC Glucose 351 (H) 65 - 105 mg/dL   POC Glucose Fingerstick    Collection Time: 02/07/21 12:10 PM   Result Value Ref Range    POC Glucose 367 (H) 65 - 105 mg/dL   Troponin    Collection Time: 02/07/21  2:08 PM   Result Value Ref Range    Troponin, High Sensitivity 15 (H) 0 - 14 ng/L    Troponin T NOT REPORTED <0.03 ng/mL    Troponin Interp NOT REPORTED    POC Glucose Fingerstick    Collection Time: 02/07/21  4:30 PM   Result Value Ref Range    POC Glucose 259 (H) 65 - 105 mg/dL   Troponin    Collection Time: 02/07/21  7:25 PM   Result Value Ref Range    Troponin, High Sensitivity 13 0 - 14 ng/L    Troponin T NOT REPORTED <0.03 ng/mL    Troponin Interp NOT REPORTED    Troponin    Collection Time: 02/08/21 12:23 AM   Result Value Ref Range    Troponin, High Sensitivity 14 0 - 14 ng/L    Troponin T NOT REPORTED <0.03 ng/mL    Troponin Interp NOT REPORTED    Basic Metabolic Panel w/ Reflex to MG    Collection Time: 02/08/21  5:43 AM   Result Value Ref Range    Glucose 333 (H) 70 - 99 mg/dL    BUN 10 6 - 20 mg/dL    CREATININE 0.83 0.50 - 0.90 mg/dL    Bun/Cre Ratio NOT REPORTED 9 - 20    Calcium 8.2 (L) 8.6 - 10.4 mg/dL    Sodium 140 135 - 144 mmol/L    Potassium 4.1 3.7 - 5.3 mmol/L    Chloride 105 98 - 107 mmol/L    CO2 21 20 - 31 mmol/L    Anion Gap 14 9 - 17 mmol/L    GFR Non-African American >60 >60 mL/min    GFR African American >60 >60 mL/min    GFR Comment          GFR Staging NOT REPORTED    CBC    Collection Time: 02/08/21  5:43 AM   Result Value Ref Range    WBC 6.4 3.5 - 11.3 k/uL    RBC 3.47 (L) 3.95 - 5.11 m/uL    Hemoglobin 10.6 (L) 11.9 - 15.1 g/dL    Hematocrit 34.4 (L) 36.3 - 47.1 %    MCV 99.1 82.6 - 102.9 fL    MCH 30.5 25.2 - 33.5 pg    MCHC 30.8 28.4 - 34.8 g/dL    RDW 12.6 11.8 - 14.4 %    Platelets 084 292 - 202 k/uL    MPV 10.8 8.1 - 13.5 fL    NRBC Automated 0.0 0.0 per 100 WBC   Troponin    Collection Time: 02/08/21  5:43 AM   Result Value Ref Range    Troponin, High Sensitivity 13 0 - 14 ng/L    Troponin T NOT REPORTED <0.03 ng/mL    Troponin Interp NOT REPORTED    POC Glucose Fingerstick    Collection Time: 02/08/21  7:38 AM   Result Value Ref Range    POC Glucose 298 (H) 65 - 105 mg/dL         Imaging/Diagonstics:  Xr Chest Portable    Result Date: 2/7/2021  EXAMINATION: ONE XRAY VIEW OF THE CHEST 2/7/2021 2:02 am COMPARISON: Chest portable October 29, 2020.  HISTORY: ORDERING SYSTEM PROVIDED HISTORY: assess pna TECHNOLOGIST PROVIDED HISTORY: assess pna Reason for Exam: upr,n/v 6 days FINDINGS: The heart is normal in size and configuration. The mediastinal contours are within normal limits. Suspected peripheral right lower lung lobe airspace disease is noted. Lungs are otherwise well aerated. The pleural surfaces are normal and no evidence of a pleural effusion is seen. Bones and soft tissues are unremarkable. Suspected peripheral right lower lung airspace disease. Ct Chest Pulmonary Embolism W Contrast    Result Date: 2/7/2021  EXAMINATION: CTA OF THE CHEST 2/7/2021 2:59 am TECHNIQUE: CTA of the chest was performed after the administration of intravenous contrast.  Multiplanar reformatted images are provided for review. MIP images are provided for review. Dose modulation, iterative reconstruction, and/or weight based adjustment of the mA/kV was utilized to reduce the radiation dose to as low as reasonably achievable. COMPARISON: Chest portable February 7, 2021. HISTORY: ORDERING SYSTEM PROVIDED HISTORY: assess for PE TECHNOLOGIST PROVIDED HISTORY: assess for PE Decision Support Exception->Emergency Medical Condition (MA) Reason for Exam: elevated d dimer Acuity: Acute Type of Exam: Initial FINDINGS: Pulmonary Arteries: Study is limited by patient breathing motion related artifact. Pulmonary arteries are adequately opacified for evaluation. No definitive evidence of intraluminal filling defect to suggest pulmonary embolism. Main pulmonary artery is normal in caliber. Mediastinum: No evidence of mediastinal lymphadenopathy. The heart is moderately enlarged with otherwise unremarkable configuration. No evidence of pericardial effusion is identified. There is no acute abnormality of the thoracic aorta. Lungs/pleura: The lungs are without acute process. No focal consolidation or pulmonary edema. No evidence of pleural effusion or pneumothorax.  Upper Abdomen: Limited images of the upper abdomen are unremarkable. Soft Tissues/Bones: No acute bone or soft tissue abnormality. 1. Note: Study limited by patient breathing motion related artifact. 2. No definitive evidence of acute pulmonary embolism. 3. Moderate cardiomegaly.        Current Facility-Administered Medications   Medication Dose Route Frequency Provider Last Rate Last Admin    sodium chloride flush 0.9 % injection 10 mL  10 mL Intravenous 2 times per day Adina Vásquez MD        sodium chloride flush 0.9 % injection 10 mL  10 mL Intravenous PRN Adina Vásquez MD        enoxaparin (LOVENOX) injection 40 mg  40 mg Subcutaneous Daily Adina Vásquez MD   40 mg at 02/07/21 5157    promethazine (PHENERGAN) tablet 12.5 mg  12.5 mg Oral Q6H PRN Adina Vásquez MD        Or    [facility]ansetron [facility]) injection 4 mg  4 mg Intravenous Q6H PRN Adina Vásquez MD        polyethylene glycol [facility]) packet 17 g  17 g Oral Daily PRN Adina Vásquez MD        acetaminophen (TYLENOL) tablet 650 mg  650 mg Oral Q6H PRN Adina Vásquez MD   650 mg at 02/08/21 0368    Or    acetaminophen (TYLENOL) suppository 650 mg  650 mg Rectal Q6H PRN Adina Vásquez MD        potassium chloride (KLOR-CON M) extended release tablet 40 mEq  40 mEq Oral PRN Adina Vásquez MD        Or    potassium bicarb-citric acid (EFFER-K) effervescent tablet 40 mEq  40 mEq Oral PRN Adina Vásquez MD        Or    potassium chloride 10 mEq/100 mL IVPB (Peripheral Line)  10 mEq Intravenous PRN Adina Vásquez MD        magnesium sulfate 2000 mg in 50 mL IVPB premix  2,000 mg Intravenous PRN Adina Vásquez MD        famotidine (PEPCID) tablet 20 mg  20 mg Oral BID Adina Vásquez MD   20 mg at 02/07/21 2120    glucose (GLUTOSE) 40 % oral gel 15 g  15 g Oral PRN Adina Vásquez MD        dextrose 50 % IV solution  12.5 g Intravenous PRN Adina Vásquez MD        glucagon (rDNA) injection 1 mg  1 mg Intramuscular PRN Adina Vásquez MD        dextrose 5 % solution  100 mL/hr Intravenous PRN Adina Vásquez MD        insulin lispro (HUMALOG) injection vial 0-12 Units  0-12 Units Subcutaneous TID WC Manolo Amador MD   6 Units at 02/07/21 1729    insulin lispro (HUMALOG) injection vial 0-6 Units  0-6 Units Subcutaneous Nightly Manolo Amador MD   4 Units at 02/07/21 2108    ARIPiprazole (ABILIFY) tablet 10 mg  10 mg Oral Daily Manolo Amador MD   10 mg at 02/07/21 8786    budesonide-formoterol (SYMBICORT) 80-4.5 MCG/ACT inhaler 2 puff  2 puff Inhalation BID Manolo Amador MD   2 puff at 02/07/21 0838    venlafaxine (EFFEXOR XR) extended release capsule 75 mg  75 mg Oral Daily with breakfast Manolo Amador MD   75 mg at 02/07/21 0925    albuterol (PROVENTIL) nebulizer solution 2.5 mg  2.5 mg Nebulization As Directed RT PRN Manolo Amador MD        0.9 % sodium chloride infusion   Intravenous Continuous Mary Willson  mL/hr at 02/07/21 0739 New Bag at 02/07/21 0739    insulin glargine (LANTUS) injection vial 15 Units  15 Units Subcutaneous Nightly Susie Faith MD   15 Units at 02/07/21 2108    lidocaine 4 % external patch 1 patch  1 patch Transdermal Daily Mary Willson MD           ASSESSMENT:     Principal Problem:    Acute kidney injury ([facility]  Active Problems:    Hyperglycemia    Chest pain    Type 2 diabetes mellitus ([facility] [facility]  Resolved Problems:    * No resolved hospital problems.  *      PLAN:   Chest pain  -EKG 2/7: Shows normal sinus rhythm, inferior infarct age undetermined  -Stat EKG this AM pending  -Trops: 13-->15-->13-->13-->repeat pending this AM  - elevated D-dimer  - CT PE unremarkable  -UDS positive for cocaine  - recent stress test on 10/29/2020: Low risk, EF 57%     Hyperglycemia in the setting of DM2 2/2 noncompliance with insulin  - likely not in DKA, bicarb 18, pH 7.314, monitor BMP  - continue IVF  cc/hr  - lantus 15U nightly  - medium dose ISS  - hypoglycemia protocol  - POCT glucose  - carb control diet  - last A1c 10 on 1/13/21     MARTIN likely 2/2 dehydration (resolved)  - Cr 1.32---->0.83  - FeNa 0.8 likely due to dehydration  - continue IVF  cc/hr  -

## 2021-02-08 NOTE — CARE COORDINATION
Case Management Initial Discharge Plan  Terri Palmer,             Met with:patient to discuss discharge plans. Information verified: address, contacts, phone number, , insurance Yes    Emergency Contact/Next of Kin name & number: none pt wants deleted    PCP: Ebenezer Velasco MD  Date of last visit: 3-4 weeks    Insurance Provider: Lidia Olvera    Discharge Planning    Living Arrangements:  Alone   Support Systems:  None    Home has 0 stories  0 stairs to climb to get into front door, 0stairs to climb to reach second floor  Location of bedroom/bathroom in home 0 homeless    Patient able to perform ADL's:Independent    Current Services (outpatient & in home) none  DME equipment: none  DME provider: none    Receiving oral anticoagulation therapy? No    If indicated:   Physician managing anticoagulation treatment: n/a  Where does patient obtain lab work for ATC treatment? n/a      Potential Assistance Needed:  N/A    Patient agreeable to home care: No  [place] of choice provided:  n/a    Prior SNF/Rehab Placement and Facility: none  Agreeable to SNF/Rehab: No  [place] of choice provided: n/a     Evaluation: yes    Expected Discharge date:  21    Patient expects to be discharged to:  homeless  Follow Up Appointment: Best Day/ Time: Monday AM    Transportation provider: lashonda  Transportation arrangements needed for discharge: Yes    Readmission Risk              Risk of Unplanned Readmission:        25             Does patient have a readmission risk score greater than 14?: Yes  If yes, follow-up appointment must be made within 7 days of discharge.      Goals of Care: to get better get off streets      Discharge Plan: Pt state sh cannot return to shelters she has been \"kicked out\" S.W. consult ordered, pcp established, will need cab          Electronically signed by Lizbeth Robb RN on 21 at 8:40 AM EST

## 2021-02-08 NOTE — PROGRESS NOTES
Physical Therapy    Facility/Department: [facility] ONC/MED SURG  Initial Assessment    NAME: Claudio Sin  : 1965  MRN: [id number]    Date of Service: 2021    Discharge Recommendations:    Further therapy recommended at discharge. PT Equipment Recommendations  Equipment Needed: (Continue to assess pending progression of mobility)    Assessment   Body structures, Functions, Activity limitations: Decreased functional mobility ; Decreased posture;Decreased endurance;Decreased ROM; Decreased strength;Decreased balance; Increased pain;Decreased safe awareness  Assessment: Pt required modA to ambulated 2ft x2 with RW, requiring max verbal and tactile cueing for all sequencing and safety this date. Pt is a fall risk and would be unsafe to perform functional mobility unassisted. Recommending continued skilled physical therapy to address functional mobility deficits to return pt to prior level of independence. Prognosis: Good  Decision Making: Medium Complexity  PT Education: Goals;PT Role;Plan of Care  REQUIRES PT FOLLOW UP: Yes  Activity Tolerance  Activity Tolerance: Patient limited by endurance; Patient limited by pain       Patient Diagnosis(es): The primary encounter diagnosis was Hyperglycemia. Diagnoses of Metabolic acidosis, Chest pain, unspecified type, and MARTIN (acute kidney injury) ([address] were also pertinent to this visit.      has a past medical history of Acid reflux, Acute cystitis with hematuria, Acute non-recurrent maxillary sinusitis, Asthma, Bipolar 1 disorder ([address] Bipolar disorder, mixed ([address] BMI 34.0-34.9,adult, Cannabis use disorder, severe, dependence ([address] Cerebrovascular accident (CVA) ([address] Chest pain, Chronic renal insufficiency, Cocaine abuse ([address] DDD (degenerative disc disease), cervical, Diabetes mellitus ([address] Dizziness, Fibromyalgia, History of stroke, Homicidal ideation, Hyperglycemia, Hypertension, Hypotension, IDDM (insulin dependent diabetes mellitus), Lupus ([address] Migraine, Neuropathy, Neuropathy, Polysubstance abuse ([facility] Post traumatic stress disorder (PTSD), Posttraumatic stress disorder, Recurrent depression ([facility] [address] Recurrent major depressive disorder, in partial remission ([facility] [address] Screening mammogram, encounter for, Severe recurrent major depression with psychotic features ([facility] [address] Severe recurrent major depression without psychotic features ([facility] [address] Stroke (cerebrum) ([facility] [address] Stroke ([facility] [address] Suicidal ideation, Suicidal intent, Vitamin D deficiency, and White matter changes. has a past surgical history that includes Abscess Drainage; chest tube insertion; Abdomen surgery; Cataract removal with implant (Bilateral); and LASIK (Bilateral).     Restrictions  Restrictions/Precautions  Restrictions/Precautions: Up as Tolerated, Fall Risk  Required Braces or Orthoses?: No  Vision/Hearing  Vision: Impaired  Vision Exceptions: Wears glasses for reading  Hearing: Within functional limits     Subjective  General  Patient assessed for rehabilitation services?: Yes  Response To Previous Treatment: Not applicable  Family / Caregiver Present: No  Follows Commands: Within Functional Limits  Subjective  Subjective: RN and pt in agreement for PT eval; pt supine in bed upon PT arrival, pt tearful, however, cooperative throughout session  Pain Screening  Patient Currently in Pain: Yes  Pain Assessment  Pain Assessment: 0-10  Pain Level: 10  Pain Type: Acute pain  Pain Location: Generalized  Pain Descriptors: Discomfort  Non-Pharmaceutical Pain Intervention(s): Ambulation/Increased Activity;Relaxation techniques  Response to Pain Intervention: Patient Satisfied  Multiple Pain Sites: No  Vital Signs  Patient Currently in Pain: Yes       Orientation  Orientation  Overall Orientation Status: Within Functional Limits  Social/Functional History  Social/Functional History  Lives With: Alone  Type of Home: Homeless  ADL Assistance: Independent  Ambulation Assistance: Independent(pt intermittently uses straight cane for ambulation due to chronic bilateral LE pain)  Transfer Assistance: Independent  Active : No  Mode of Transportation: Walk, Cab, Bus  Cognition   Cognition  Overall Cognitive Status: WFL    Objective  Joint Mobility  Spine: WFL  ROM RLE: WFL  ROM LLE: WFL  ROM RUE: WFL  ROM LUE: WFL  Strength RLE  Strength RLE: WFL  Strength LLE  Strength LLE: WFL  Strength RUE  Strength RUE: WFL  Strength LUE  Strength LUE: WFL  Motor Control  Gross Motor?: WFL  Sensation  Overall Sensation Status: Impaired(Reports chronic numbness/tingling of bilateral feet)  Bed mobility  Supine to Sit: Minimal assistance(Assist provided for trunk progression)  Sit to Supine: Contact guard assistance  Comment: HOB elevated for 45 degrees with use bed rails  Transfers  Sit to Stand: Moderate Assistance  Stand to sit: Moderate Assistance  Comment: Sit to stand performed x2 from EOB and bedside commode, pt required max verbal cueing for hand placement and bilateral lower extremity placement with poor return demo. Pt attempts to pull on writer despite max cueing for safety. Ambulation  Ambulation?: Yes  Ambulation 1  Surface: level tile  Device: Rolling Walker  Assistance: Moderate assistance  Quality of Gait: Significant forward flexed posture; high reliance on RW; shuffling gait; decreased heel strike and toe off  Gait Deviations: Slow Sonia; Shuffles  Distance: 2ft x2  Comments: Pt required max verbal cueing for sequencing with poor return demo. Pt ambulated to/ from bedside commode, independently performing pericare. Pt demonstrates significant forward flexed posture, not achieving full upright posture despitre max verbal cueing. Pt reports bilateral UE itching following ambulating, RN notified and aware.   Stairs/Curb  Stairs?: No     Balance  Posture: Fair  Sitting - Static: Good;-  Sitting - Dynamic: Fair;+  Standing - Static: Fair;-  Standing - Dynamic: Poor;+  Comments: Standing balance assessed w/ RW; pt able to sit EOB CGA once established        Plan   Plan  Times per week: 5-6x/week  Current Treatment Recommendations: Strengthening, Transfer Training, Endurance Training, Patient/Caregiver Education & Training, ROM, Balance Training, Gait Training, Home Exercise Program, Functional Mobility Training, Stair training, Safety Education & Training  Safety Devices  Type of devices: Left in bed, Patient at risk for falls, Bed alarm in place, Call light within reach, Gait belt, Nurse notified  Restraints  Initially in place: No  AM-PAC Score     AM-PAC Inpatient Mobility without Stair Climbing Raw Score : 10 (02/08/21 1648)  AM-PAC Inpatient without Stair Climbing T-Scale Score : 34.07 (02/08/21 1648)  Mobility Inpatient CMS 0-100% Score: 71.66 (02/08/21 1648)  Mobility Inpatient without Stair CMS G-Code Modifier : CL (02/08/21 1648)       Goals  Short term goals  Time Frame for Short term goals: 14  Short term goal 1: Pt to perform bed mobility SBA  Short term goal 2: Demonstrate functional transfers CGA  Short term goal 3: Ambulate 50ft w/ least restrictive AD CGA  Short term goal 4: Demonstrate standing balance of fair + to decrease fall risk  Patient Goals   Patient goals :  To feel better       Therapy Time   Individual Concurrent Group Co-treatment   Time In 2090         Time Out 1602         Minutes 20         Timed Code Treatment Minutes: 8 Minutes       Troy Watson PT

## 2021-02-08 NOTE — FLOWSHEET NOTE
Patient sitting up in bed, medicated for anxiety per order/request of pt.  Lab at bedside for lab draw

## 2021-02-08 NOTE — CARE COORDINATION
Consult received this date for possibly homeless. Chart reviewed  Met with pt this date was awake and cooperative. Pt states she spent 60 days at [facility] and [facility]. Pt states she was put out of the program a few days ago due to her medical issues. Pt states she spent a few days at a hotel until her money ran out. Pt reports she then relapsed and stated using cocaine and marijuana. Pt states she is homeless and has nowhere to go. Pt states she is familiar with local shelters. Informed pt of winter crisis program at [facility] . Shelter list given also. Pt states \" I want to go to a place where they can take care of my medical stuff. \"  Pt states she receives $700+ monthly. [employer] given. Discussed [facility] program. Pt signed and completed application . Referral faxed. Writer placed call to [facility], spoke with Antonina Mcneill states, pt was non-compliant with her medications and  declined to follow thru with physicians instructions. Pts blood sugars remained high. Pt was not kicked out of the program. Pt chose to leave on her own approx 1 week ago. Pt was given time to make other living conditions. [facility] arranged for [facility] to go to [facility] and pt did not show up. Placed call to [facility] [phone] to see if that option is still available, spoke with Nelta Pallas states will need a CHRIS. Will inform pt of above. Insurance is [employer].

## 2021-02-08 NOTE — PROGRESS NOTES
CM and RN into discuss benefits of therapy as ordered and encouraged patient to not refuse services to aid in promotion of health/strength of patient, patient agreed to let therapy come in and work with patient, therapy notified

## 2021-02-08 NOTE — CARE COORDINATION
Transitional Plannin-Met with patient at bedside to discuss SNF options. Pt states she has no preference, and to just let her know where she is going. Referrals sent to [facility] and [address] [place] [address]

## 2021-02-08 NOTE — CARE COORDINATION
Met with pt this date and discussed [facility]. Pt signed CHRIS form. Coordinated phone call with pt and Monie Marr at [facility]. Monie Marr still has room available. Pt states she is unable to walk up the stairs. Monie Marr states  bedroom is located on 1st floor and pt will need to climb 3 steps to the bathroom with shower. 2nd bathroom with tub is on 2nd floor with additional 4 steps to climb. If pt is able to walk and climb stairs prior d/c will accept pt. Informed 4C CM of above. Noted pt declined PT this a.m. Met with pt and encouraged her to participate in PT. Pt wants to go to SNF.

## 2021-02-08 NOTE — FLOWSHEET NOTE
patient transferred to bedside commode with stand by assist had moderate gait with pivot, patient stating that she has no home to go to and was kicked out of previous shelter for addiction and then started using drugs again. Patient lost daughter this past brendan and son in 2016 denies thoughts of wanting to harm self or others but has concerns on discharge of where she will be going and does only has 1 outfit to her name.

## 2021-02-09 LAB
ANION GAP SERPL CALCULATED.3IONS-SCNC: 11 MMOL/L (ref 9–17)
BUN BLDV-MCNC: 10 MG/DL (ref 6–20)
BUN/CREAT BLD: ABNORMAL (ref 9–20)
CALCIUM SERPL-MCNC: 8.2 MG/DL (ref 8.6–10.4)
CHLORIDE BLD-SCNC: 97 MMOL/L (ref 98–107)
CO2: 22 MMOL/L (ref 20–31)
CREAT SERPL-MCNC: 0.86 MG/DL (ref 0.5–0.9)
EKG ATRIAL RATE: 99 BPM
EKG P AXIS: 0 DEGREES
EKG P-R INTERVAL: 90 MS
EKG Q-T INTERVAL: 370 MS
EKG QRS DURATION: 84 MS
EKG QTC CALCULATION (BAZETT): 474 MS
EKG R AXIS: 23 DEGREES
EKG T AXIS: 48 DEGREES
EKG VENTRICULAR RATE: 99 BPM
GFR AFRICAN AMERICAN: >60 ML/MIN
GFR NON-AFRICAN AMERICAN: >60 ML/MIN
GFR SERPL CREATININE-BSD FRML MDRD: ABNORMAL ML/MIN/{1.73_M2}
GFR SERPL CREATININE-BSD FRML MDRD: ABNORMAL ML/MIN/{1.73_M2}
GLUCOSE BLD-MCNC: 274 MG/DL (ref 65–105)
GLUCOSE BLD-MCNC: 377 MG/DL (ref 65–105)
GLUCOSE BLD-MCNC: 382 MG/DL (ref 65–105)
GLUCOSE BLD-MCNC: 394 MG/DL (ref 70–99)
GLUCOSE BLD-MCNC: 464 MG/DL (ref 65–105)
HCT VFR BLD CALC: 31.6 % (ref 36.3–47.1)
HEMOGLOBIN: 9.8 G/DL (ref 11.9–15.1)
MCH RBC QN AUTO: 31.5 PG (ref 25.2–33.5)
MCHC RBC AUTO-ENTMCNC: 31 G/DL (ref 28.4–34.8)
MCV RBC AUTO: 101.6 FL (ref 82.6–102.9)
NRBC AUTOMATED: 0.3 PER 100 WBC
PDW BLD-RTO: 12.7 % (ref 11.8–14.4)
PLATELET # BLD: 291 K/UL (ref 138–453)
PMV BLD AUTO: 10.6 FL (ref 8.1–13.5)
POTASSIUM SERPL-SCNC: 4.2 MMOL/L (ref 3.7–5.3)
RBC # BLD: 3.11 M/UL (ref 3.95–5.11)
SARS-COV-2, RAPID: NOT DETECTED
SARS-COV-2: NORMAL
SARS-COV-2: NORMAL
SODIUM BLD-SCNC: 130 MMOL/L (ref 135–144)
SOURCE: NORMAL
TROPONIN INTERP: NORMAL
TROPONIN T: NORMAL NG/ML
TROPONIN, HIGH SENSITIVITY: 11 NG/L (ref 0–14)
TROPONIN, HIGH SENSITIVITY: 11 NG/L (ref 0–14)
TROPONIN, HIGH SENSITIVITY: 12 NG/L (ref 0–14)
TROPONIN, HIGH SENSITIVITY: 12 NG/L (ref 0–14)
WBC # BLD: 6.3 K/UL (ref 3.5–11.3)

## 2021-02-09 PROCEDURE — 2580000003 HC RX 258: Performed by: STUDENT IN AN ORGANIZED HEALTH CARE EDUCATION/TRAINING PROGRAM

## 2021-02-09 PROCEDURE — 82947 ASSAY GLUCOSE BLOOD QUANT: CPT

## 2021-02-09 PROCEDURE — 94640 AIRWAY INHALATION TREATMENT: CPT

## 2021-02-09 PROCEDURE — 85027 COMPLETE CBC AUTOMATED: CPT

## 2021-02-09 PROCEDURE — 1200000000 HC SEMI PRIVATE

## 2021-02-09 PROCEDURE — 36415 COLL VENOUS BLD VENIPUNCTURE: CPT

## 2021-02-09 PROCEDURE — 99233 SBSQ HOSP IP/OBS HIGH 50: CPT | Performed by: FAMILY MEDICINE

## 2021-02-09 PROCEDURE — U0002 COVID-19 LAB TEST NON-CDC: HCPCS

## 2021-02-09 PROCEDURE — 6370000000 HC RX 637 (ALT 250 FOR IP): Performed by: STUDENT IN AN ORGANIZED HEALTH CARE EDUCATION/TRAINING PROGRAM

## 2021-02-09 PROCEDURE — 97535 SELF CARE MNGMENT TRAINING: CPT

## 2021-02-09 PROCEDURE — 80048 BASIC METABOLIC PNL TOTAL CA: CPT

## 2021-02-09 PROCEDURE — 97166 OT EVAL MOD COMPLEX 45 MIN: CPT

## 2021-02-09 PROCEDURE — 84484 ASSAY OF TROPONIN QUANT: CPT

## 2021-02-09 RX ORDER — VENLAFAXINE HYDROCHLORIDE 150 MG/1
150 CAPSULE, EXTENDED RELEASE ORAL
Qty: 30 CAPSULE | Refills: 3 | Status: SHIPPED | OUTPATIENT
Start: 2021-02-10 | End: 2021-07-22 | Stop reason: SDUPTHER

## 2021-02-09 RX ORDER — GABAPENTIN 600 MG/1
900 TABLET ORAL 3 TIMES DAILY
Status: DISCONTINUED | OUTPATIENT
Start: 2021-02-09 | End: 2021-02-10 | Stop reason: HOSPADM

## 2021-02-09 RX ORDER — INSULIN GLARGINE 100 [IU]/ML
15 INJECTION, SOLUTION SUBCUTANEOUS NIGHTLY
Qty: 5 PEN | Refills: 5 | Status: ON HOLD | OUTPATIENT
Start: 2021-02-09 | End: 2021-06-11 | Stop reason: SDUPTHER

## 2021-02-09 RX ORDER — HYDROCODONE BITARTRATE AND ACETAMINOPHEN 5; 325 MG/1; MG/1
1 TABLET ORAL
Status: COMPLETED | OUTPATIENT
Start: 2021-02-09 | End: 2021-02-09

## 2021-02-09 RX ADMIN — BUDESONIDE AND FORMOTEROL FUMARATE DIHYDRATE 2 PUFF: 80; 4.5 AEROSOL RESPIRATORY (INHALATION) at 21:20

## 2021-02-09 RX ADMIN — GABAPENTIN 900 MG: 600 TABLET ORAL at 13:31

## 2021-02-09 RX ADMIN — GABAPENTIN 300 MG: 600 TABLET ORAL at 08:23

## 2021-02-09 RX ADMIN — ARIPIPRAZOLE 10 MG: 10 TABLET ORAL at 08:22

## 2021-02-09 RX ADMIN — ACETAMINOPHEN 650 MG: 325 TABLET ORAL at 08:33

## 2021-02-09 RX ADMIN — HYDROCODONE BITARTRATE AND ACETAMINOPHEN 1 TABLET: 5; 325 TABLET ORAL at 22:32

## 2021-02-09 RX ADMIN — FAMOTIDINE 20 MG: 20 TABLET, FILM COATED ORAL at 20:48

## 2021-02-09 RX ADMIN — GABAPENTIN 900 MG: 600 TABLET ORAL at 20:48

## 2021-02-09 RX ADMIN — INSULIN LISPRO 6 UNITS: 100 INJECTION, SOLUTION INTRAVENOUS; SUBCUTANEOUS at 08:33

## 2021-02-09 RX ADMIN — FAMOTIDINE 20 MG: 20 TABLET, FILM COATED ORAL at 08:23

## 2021-02-09 RX ADMIN — INSULIN LISPRO 6 UNITS: 100 INJECTION, SOLUTION INTRAVENOUS; SUBCUTANEOUS at 20:49

## 2021-02-09 RX ADMIN — ACETAMINOPHEN 650 MG: 325 TABLET ORAL at 20:48

## 2021-02-09 RX ADMIN — SODIUM CHLORIDE, PRESERVATIVE FREE 10 ML: 5 INJECTION INTRAVENOUS at 20:53

## 2021-02-09 RX ADMIN — SODIUM CHLORIDE, PRESERVATIVE FREE 10 ML: 5 INJECTION INTRAVENOUS at 08:24

## 2021-02-09 RX ADMIN — INSULIN LISPRO 10 UNITS: 100 INJECTION, SOLUTION INTRAVENOUS; SUBCUTANEOUS at 17:08

## 2021-02-09 RX ADMIN — INSULIN GLARGINE 20 UNITS: 100 INJECTION, SOLUTION SUBCUTANEOUS at 20:50

## 2021-02-09 RX ADMIN — VENLAFAXINE HYDROCHLORIDE 150 MG: 150 CAPSULE, EXTENDED RELEASE ORAL at 08:24

## 2021-02-09 RX ADMIN — INSULIN LISPRO 10 UNITS: 100 INJECTION, SOLUTION INTRAVENOUS; SUBCUTANEOUS at 11:41

## 2021-02-09 ASSESSMENT — PAIN SCALES - GENERAL
PAINLEVEL_OUTOF10: 10

## 2021-02-09 ASSESSMENT — PAIN DESCRIPTION - PAIN TYPE
TYPE: ACUTE PAIN
TYPE: ACUTE PAIN

## 2021-02-09 ASSESSMENT — PAIN DESCRIPTION - ORIENTATION: ORIENTATION: LEFT;RIGHT

## 2021-02-09 ASSESSMENT — PAIN DESCRIPTION - LOCATION: LOCATION: BACK;MOUTH

## 2021-02-09 NOTE — CARE COORDINATION
Transitional Planning    Faxes to referrals noted to have failed. Refaxed referrals to [facility] and [facility]. [address] of [address], left  for return phone call with Admissions. [address], spoke with Jesus [phone] with admissions . She will review referral and clinical information for potential acceptance at [facility] or [facility] (sister facility). [address] from [facility] called. Correct fax # is [phone]. She does not have access to Epic and requests PT/OT, progress, H&P and med list be faxed along with referral.    4102 PT/OT, progress, H&P and med list faxed to [phone] and verified that she received faxed material. DELBERT is reviewing information. Yancy Smith will check with [facility] and call CM back. Yancy Smith relates that the DON thinks the patient has been to their facility before. [phone] Call received from Yancy Smith at [facility]. She relates they are able to accept. A pre cert is needed. She will see if they can accept patient before pre cert is received. She will call CM when she knows. [address]ta [address] Dr with Yancy Smith at [facility]. They can accept patient. No bed available until tomorrow. [address] at [facility], they can accept patient today. Need FANNIE, covid test, fax info to [phone] or [phone], report to [phone], [facility] Nurse's station. 1440 told patient [facility] can accept today and CM is working on transportation for today. She verbalized understanding. 1500 Left VM to Yancy Smith and told her that [facility] can accept patient today so [address] Lidya returned phone call and is aware that patient is going to [facility] today. Nicola 6283 to Jesus at [facility]. Patient does not have to have covid resulted before transfer as she will be put in quarantine per standard of care.  Covid does need to be done, though. HENS done, Jennifer Nolan able to access HENS information. Fax # [phone] or [phone]. Call report to [phone] and ask for [facility] Nurse's station. [address] [facility] AVS/MINDA to [phone] CM has called [address] [place] [address] for time for transportation 3 times since paper was faxed at 3pm. They are backed up and will call when information is processed. [address] at [facility] and told her Cm still does not have a time for transportation. They can accept patient 24/7. CM or Nurse to call [phone] and select option for nurses' station to let them know transport time if patient is coming or let them know transportation is not going to happen today d/t late time. 4137 Updated patient that CM is still waiting for a time for transportation. If can be picked up by 10 pm, she can go tonight. If after 10 PM will schedule transfer to SNF tomorrow. [facility] to [facility] at [address] [place] [address], transportation is arranged for midnight tonight. Cm told her we don't transport people after 10 pm.  time set for tomorrow 2/10/21 at 9am. Patient updated. Called [facility] and spoke to [facility] and told them patient will be discharged tomorrow with  time of 9AM..

## 2021-02-09 NOTE — PROGRESS NOTES
DATE: 2021    NAME: Suzanna Noble  MRN: [id number]   : 1965      Patient not seen this date for Physical Therapy due to:    Patient Declined: Pt stated she was just up out of bed, and now she wanted to rest. Requested to come back tomorrow.       Electronically signed by Dylan Godinez PTA on 2021 at 2:19 PM

## 2021-02-09 NOTE — PROGRESS NOTES
[facility]  Progress Note    Date:   2/9/2021  Patient name:  Virgen Garcia  Date of admission:  2/7/2021  1:24 AM  MRN:   [id number]  YOB: 1965    SUBJECTIVE/Last 24 hours update:     Patient was seen and examined bedside. No acute events overnight. As per nursing staff, patient complains of feeling anxious. However, patient did not appear overly anxious when examined today. Patient states she is still having intermittent chest pain. Chest pain is reproducible. EKG done yesterday showed no acute changes. Patient complains of subjective nausea, but no vomiting. Denies abdominal pain. Patient is stable, medically cleared for discharge. Waiting for placement      Notes from nursing staff and Consults had been reviewed, and the overnight progress had been checked with the nursing staff as well.     HPI:   Please see H&P    REVIEW OF SYSTEMS:      CONSTITUTIONAL:  no fevers, no fatigue   HEENT: No headaches, no blurry vision, no tinnitus, no nasal congestion, no difficulty swallowing  RESPIRATORY:negative for dyspnea, no wheezing, no Cough  CARDIOVASCULAR: no palpitations, chest pain  GASTROINTESTINAL: no nausea, no vomiting, no change in bowel habits, no abdominal pain   GENITOURINARY: negative for dysuria, no hematuria   MUSCULOSKELETAL: no joint pains, no muscle aches, no swelling of joints or extremities  NEUROLOGICAL: No  Weakness or numbness      PAST MEDICAL HISTORY:      has a past medical history of Acid reflux, Acute cystitis with hematuria, Acute non-recurrent maxillary sinusitis, Asthma, Bipolar 1 disorder ([address] Bipolar disorder, mixed ([address] BMI 34.0-34.9,adult, Cannabis use disorder, severe, dependence ([address] Cerebrovascular accident (CVA) ([address] Chest pain, Chronic renal insufficiency, Cocaine abuse ([address] DDD (degenerative disc disease), cervical, Diabetes mellitus ([address] Dizziness, Fibromyalgia, History of stroke, Homicidal ideation, Hyperglycemia, Hypertension, Hypotension, IDDM (insulin dependent diabetes mellitus), Lupus ([facility] Migraine, Neuropathy, Neuropathy, Polysubstance abuse ([facility] Post traumatic stress disorder (PTSD), Posttraumatic stress disorder, Recurrent depression ([facility] Recurrent major depressive disorder, in partial remission ([facility] Screening mammogram, encounter for, Severe recurrent major depression with psychotic features ([facility] Severe recurrent major depression without psychotic features ([facility] Stroke (cerebrum) ([facility] Stroke ([facility] Suicidal ideation, Suicidal intent, Vitamin D deficiency, and White matter changes. PAST SURGICAL HISTORY:      has a past surgical history that includes Abscess Drainage; chest tube insertion; Abdomen surgery; Cataract removal with implant (Bilateral); and LASIK (Bilateral). SOCIAL HISTORY:      reports that she has never smoked. She has never used smokeless tobacco. She reports previous alcohol use. She reports previous drug use. Drugs: Marijuana and Cocaine. FAMILY HISTORY:     family history includes Diabetes in her brother, father, mother, and sister; No Known Problems in her sister; Other in her son; Stroke in her mother. HOME MEDICATIONS:      Prior to Admission medications    Medication Sig Start Date End Date Taking? Authorizing Provider   Misc.  Devices MISC 1 pair of dm shoes, 3 accommodated inserts 1/22/21   Ian Michelle DPM   insulin glargine (LANTUS SOLOSTAR) 100 UNIT/ML injection pen Inject 13 Units into the skin nightly 1/15/21   Felisha Denney MD   dicyclomine (BENTYL) 20 MG tablet Take 1 tablet by mouth 3 times daily as needed (for abdominal discomfort) 1/13/21   Felisha Denney MD   cetirizine (ZYRTEC) 10 MG tablet Take 1 tablet by mouth daily 1/13/21   Felisha Denney MD   Insulin Pen Needle ([employer] PEN NEEDLES 31G) 31G X 8 MM MISC 1 each by Does not apply route daily 1/4/21   Felisha Denney MD   ARIPiprazole (ABILIFY) 10 MG tablet Take 1 tablet by mouth daily 12/11/20   Juve De La Vega MD   gabapentin (NEURONTIN) 300 MG capsule TAKE 3 CAPSULES 900 MG BY MOUTH THREE TIMES DAILY 12/9/20 1/8/21  Juve De La Vega MD    MG capsule TAKE 1 CAPSULE BY MOUTH TWICE DAILY 12/9/20   Juve De La Vega MD   acetaminophen (TYLENOL) 325 MG tablet TAKE 2 TABLETS BY MOUTH EVERY 6 HOURS AS NEEDED FOR PAIN 12/9/20   Juve De La Vega MD   metFORMIN (GLUCOPHAGE) 1000 MG tablet TAKE 1 TABLET BY MOUTH DAILY WITH BREAKFAST 12/9/20   Juve De La Vega MD   traZODone (DESYREL) 150 MG tablet Take 1 tablet by mouth nightly 11/18/20   Juve De La Vega MD   omeprazole (PRILOSEC) 20 MG delayed release capsule Take 1 capsule by mouth Daily 11/18/20   Juve De La Vega MD   FREESTYLE LITE strip 1 each by Does not apply route 3 times daily 11/11/20   Juve De La Vega MD   FreeStyle Lancets MISC 1 each by Does not apply route 3 times daily 11/11/20   Juve De La Vega MD   Alcohol Swabs (ALCOHOL PREP) 70 % PADS 1 each by Does not apply route as needed (use as needed) Use_____times per day  Diagnosis: 250.0   Diabetes ___Insulin-Dependent___Non-Insulin Dependent 11/11/20   Juve De La Vega MD   venlafaxine (EFFEXOR XR) 75 MG extended release capsule  11/2/20   Historical Provider, MD   FLUoxetine (PROZAC) 10 MG capsule Take 60 mg by mouth daily    Historical Provider, MD   benzonatate (TESSALON) 200 MG capsule  8/17/20   Historical Provider, MD   celecoxib (CELEBREX) 200 MG capsule  10/10/20   Historical Provider, MD   albuterol sulfate  (90 Base) MCG/ACT inhaler Inhale 2 puffs into the lungs every 4 hours as needed for Wheezing 10/20/20 11/20/20  Juve De La Vega MD   FLOVENT  MCG/ACT inhaler Inhale 2 puffs into the lungs 2 times daily 10/20/20   Juve De La Vega MD   SITagliptin (JANUVIA) 100 MG tablet Take 1 tablet by mouth daily 10/20/20   Juve De La Vega MD   budesonide-formoterol (SYMBICORT) 80-4.5 MCG/ACT AERO Inhale 2 puffs into the lungs 2 times daily 10/20/20   Mickiel MD Minnie   melatonin (RA MELATONIN) 3 MG TABS tablet Take 1 tablet by mouth daily 10/20/20   Charlee Hartman MD   citalopram (CELEXA) 40 MG tablet Take 1 tablet by mouth daily 9/12/17   Allan Bess MD       ALLERGIES:     Bactrim [sulfamethoxazole-trimethoprim] and Adhesive tape      OBJECTIVE:       Vitals:    02/08/21 1116 02/08/21 2013 02/09/21 0600 02/09/21 0730   BP: 129/75 114/61  (!) 121/58   Pulse: 80 81  79   Resp: 18 22  18   Temp: 98 °F (36.7 °C) 98.5 °F (36.9 °C)  98.2 °F (36.8 °C)   TempSrc: Oral Oral  Oral   SpO2: 100% 98%  98%   Weight:   242 lb 11.6 oz (110.1 kg)    Height:             Intake/Output Summary (Last 24 hours) at 2/9/2021 0931  Last data filed at 2/9/2021 7376  Gross per 24 hour   Intake 1480 ml   Output 1800 ml   Net -320 ml       PHYSICAL EXAM:  General Appearance  Alert , awake , not in acute distress  HEENT - Head is normocephalic, atraumatic. Lungs - Bilateral equal air entry , no wheezes, rales or rhonchi, aeration good  Cardiovascular - Heart sounds are normal.  Regular rhythm, normal rate without murmur, gallop or rub.  Reproducible chest pain  Abdomen - Soft, nontender, nondistended, no masses or organomegaly  Neurologic - There are no new focal motor or sensory deficits  Skin - No bruising or bleeding on exposed skin area  Extremities - No cyanosis, clubbing or edema      DIAGNOSTICS:     Laboratory Testing:    Recent Results (from the past 24 hour(s))   POC Glucose Fingerstick    Collection Time: 02/08/21 11:17 AM   Result Value Ref Range    POC Glucose 407 (HH) 65 - 105 mg/dL   Troponin    Collection Time: 02/08/21 12:19 PM   Result Value Ref Range    Troponin, High Sensitivity 12 0 - 14 ng/L    Troponin T NOT REPORTED <0.03 ng/mL    Troponin Interp NOT REPORTED    POC Glucose Fingerstick    Collection Time: 02/08/21  4:14 PM   Result Value Ref Range    POC Glucose 349 (H) 65 - 105 mg/dL   Troponin    Collection Time: 02/08/21  6:45 PM   Result Value Ref Range    Troponin, High Sensitivity 12 0 - 14 ng/L    Troponin T NOT REPORTED <0.03 ng/mL    Troponin Interp NOT REPORTED    POC Glucose Fingerstick    Collection Time: 02/08/21  8:16 PM   Result Value Ref Range    POC Glucose 319 (H) 65 - 105 mg/dL   Basic Metabolic Panel w/ Reflex to MG    Collection Time: 02/09/21 12:03 AM   Result Value Ref Range    Glucose 394 (H) 70 - 99 mg/dL    BUN 10 6 - 20 mg/dL    CREATININE 0.86 0.50 - 0.90 mg/dL    Bun/Cre Ratio NOT REPORTED 9 - 20    Calcium 8.2 (L) 8.6 - 10.4 mg/dL    Sodium 130 (L) 135 - 144 mmol/L    Potassium 4.2 3.7 - 5.3 mmol/L    Chloride 97 (L) 98 - 107 mmol/L    CO2 22 20 - 31 mmol/L    Anion Gap 11 9 - 17 mmol/L    GFR Non-African American >60 >60 mL/min    GFR African American >60 >60 mL/min    GFR Comment          GFR Staging NOT REPORTED    CBC    Collection Time: 02/09/21 12:03 AM   Result Value Ref Range    WBC 6.3 3.5 - 11.3 k/uL    RBC 3.11 (L) 3.95 - 5.11 m/uL    Hemoglobin 9.8 (L) 11.9 - 15.1 g/dL    Hematocrit 31.6 (L) 36.3 - 47.1 %    .6 82.6 - 102.9 fL    MCH 31.5 25.2 - 33.5 pg    MCHC 31.0 28.4 - 34.8 g/dL    RDW 12.7 11.8 - 14.4 %    Platelets 239 980 - 170 k/uL    MPV 10.6 8.1 - 13.5 fL    NRBC Automated 0.3 (H) 0.0 per 100 WBC   Troponin    Collection Time: 02/09/21 12:03 AM   Result Value Ref Range    Troponin, High Sensitivity 11 0 - 14 ng/L    Troponin T NOT REPORTED <0.03 ng/mL    Troponin Interp NOT REPORTED    Troponin    Collection Time: 02/09/21  6:12 AM   Result Value Ref Range    Troponin, High Sensitivity 11 0 - 14 ng/L    Troponin T NOT REPORTED <0.03 ng/mL    Troponin Interp NOT REPORTED    POC Glucose Fingerstick    Collection Time: 02/09/21  7:41 AM   Result Value Ref Range    POC Glucose 274 (H) 65 - 105 mg/dL         Imaging/Diagonstics:  Xr Chest Portable    Result Date: 2/7/2021  EXAMINATION: ONE XRAY VIEW OF THE CHEST 2/7/2021 2:02 am COMPARISON: Chest portable October 29, 2020. HISTORY: ORDERING SYSTEM PROVIDED HISTORY: assess pna TECHNOLOGIST PROVIDED HISTORY: assess pna Reason for Exam: upr,n/v 6 days FINDINGS: The heart is normal in size and configuration. The mediastinal contours are within normal limits. Suspected peripheral right lower lung lobe airspace disease is noted. Lungs are otherwise well aerated. The pleural surfaces are normal and no evidence of a pleural effusion is seen. Bones and soft tissues are unremarkable. Suspected peripheral right lower lung airspace disease. Ct Chest Pulmonary Embolism W Contrast    Result Date: 2/7/2021  EXAMINATION: CTA OF THE CHEST 2/7/2021 2:59 am TECHNIQUE: CTA of the chest was performed after the administration of intravenous contrast.  Multiplanar reformatted images are provided for review. MIP images are provided for review. Dose modulation, iterative reconstruction, and/or weight based adjustment of the mA/kV was utilized to reduce the radiation dose to as low as reasonably achievable. COMPARISON: Chest portable February 7, 2021. HISTORY: ORDERING SYSTEM PROVIDED HISTORY: assess for PE TECHNOLOGIST PROVIDED HISTORY: assess for PE Decision Support Exception->Emergency Medical Condition (MA) Reason for Exam: elevated d dimer Acuity: Acute Type of Exam: Initial FINDINGS: Pulmonary Arteries: Study is limited by patient breathing motion related artifact. Pulmonary arteries are adequately opacified for evaluation. No definitive evidence of intraluminal filling defect to suggest pulmonary embolism. Main pulmonary artery is normal in caliber. Mediastinum: No evidence of mediastinal lymphadenopathy. The heart is moderately enlarged with otherwise unremarkable configuration. No evidence of pericardial effusion is identified. There is no acute abnormality of the thoracic aorta. Lungs/pleura: The lungs are without acute process. No focal consolidation or pulmonary edema. No evidence of pleural effusion or pneumothorax.  Upper Abdomen: Limited images of the upper abdomen are unremarkable. Soft Tissues/Bones: No acute bone or soft tissue abnormality. 1. Note: Study limited by patient breathing motion related artifact. 2. No definitive evidence of acute pulmonary embolism. 3. Moderate cardiomegaly.        Current Facility-Administered Medications   Medication Dose Route Frequency Provider Last Rate Last Admin    gabapentin (NEURONTIN) tablet 300 mg  300 mg Oral TID Maru Rivas MD   300 mg at 02/09/21 0823    venlafaxine (EFFEXOR XR) extended release capsule 150 mg  150 mg Oral Daily with breakfast Maru Rivas MD   150 mg at 02/09/21 0824    insulin glargine (LANTUS) injection vial 20 Units  20 Units Subcutaneous Nightly Maru Rivas MD   20 Units at 02/08/21 2049    sodium chloride flush 0.9 % injection 10 mL  10 mL Intravenous 2 times per day Dayna Mustafa MD   10 mL at 02/09/21 0824    sodium chloride flush 0.9 % injection 10 mL  10 mL Intravenous PRN Dayna Mustafa MD        enoxaparin (LOVENOX) injection 40 mg  40 mg Subcutaneous Daily Dayna Mustafa MD   40 mg at 02/07/21 0925    promethazine (PHENERGAN) tablet 12.5 mg  12.5 mg Oral Q6H PRN Dayna Mustafa MD        Or    [facility]ron [facility] injection 4 mg  4 mg Intravenous Q6H PRN Dayna Mustafa MD        polyethylene glycol [facility]) packet 17 g  17 g Oral Daily PRN Dayna Mustafa MD        acetaminophen (TYLENOL) tablet 650 mg  650 mg Oral Q6H PRN Dayna Mustafa MD   650 mg at 02/09/21 6616    Or    acetaminophen (TYLENOL) suppository 650 mg  650 mg Rectal Q6H PRN Dayna Mustafa MD        potassium chloride (KLOR-CON M) extended release tablet 40 mEq  40 mEq Oral PRN Dayna Mustafa MD        Or    potassium bicarb-citric acid (EFFER-K) effervescent tablet 40 mEq  40 mEq Oral PRN Dayna Mustafa MD        Or    potassium chloride 10 mEq/100 mL IVPB (Peripheral Line)  10 mEq Intravenous PRN Dayna Mustafa MD        magnesium sulfate 2000 mg in 50 mL IVPB premix  2,000 mg Intravenous PRN Dayna Mustafa MD  famotidine (PEPCID) tablet 20 mg  20 mg Oral BID Michael Staton MD   20 mg at 02/09/21 0823    glucose (GLUTOSE) 40 % oral gel 15 g  15 g Oral PRN Michael Staton MD        dextrose 50 % IV solution  12.5 g Intravenous PRN Michael Staton MD        glucagon (rDNA) injection 1 mg  1 mg Intramuscular PRN Michael Staton MD        dextrose 5 % solution  100 mL/hr Intravenous PRN Michael Staton MD        insulin lispro (HUMALOG) injection vial 0-12 Units  0-12 Units Subcutaneous TID [facility] Michael Staton MD   6 Units at 02/09/21 0947    insulin lispro (HUMALOG) injection vial 0-6 Units  0-6 Units Subcutaneous Nightly Michael Staton MD   4 Units at 02/08/21 2049    ARIPiprazole (ABILIFY) tablet 10 mg  10 mg Oral Daily Michael Staton MD   10 mg at 02/09/21 8163    budesonide-formoterol (SYMBICORT) 80-4.5 MCG/ACT inhaler 2 puff  2 puff Inhalation BID Michael Staton MD   2 puff at 02/08/21 2023    albuterol (PROVENTIL) nebulizer solution 2.5 mg  2.5 mg Nebulization As Directed RT PRN Michael Staton MD        lidocaine 4 % external patch 1 patch  1 patch Transdermal Daily Guy Cao MD   1 patch at 02/09/21 9269       ASSESSMENT:     Principal Problem:    Acute kidney injury [facility])  Active Problems:    Hyperglycemia    Chest pain    Type 2 diabetes mellitus ([address]  Resolved Problems:    * No resolved hospital problems.  *      PLAN:   Chest pain likely 2/2 recent cocaine use  -EKG 2/8: Normal  -elevated D-dimer  - CT PE unremarkable  -UDS positive for cocaine  -recent stress test on 10/29/2020: Low risk, EF 57%  -Waiting for placement     Hyperglycemia in the setting of DM2 2/2 noncompliance with insulin  - likely not in DKA, bicarb 18, pH 7.314, monitor BMP  - MIVF DC'd  - lantus 20U nightly  - medium dose ISS  - hypoglycemia protocol  - POCT glucose  - carb control diet  - last A1c 10 on 1/13/21     MARTIN likely 2/2 dehydration (resolved)  - Cr 1.32---->0.83--> -0.86  - FeNa 0.8 likely due to dehydration  - continue IVF  cc/hr  - UA shows large ketones, 3+ glucose     Asthma  - stable  - Resumed home meds Symbicort  - RT eval  - aerosols     DVT prophylaxis: Lovenox 40 mg SC  GI prophylaxis: Famotidine 20 mg BID        Michelle Darling MD  Family Medicine Resident  2/9/2021 9:31 AM   Attending Physician Statement  I have discussed the care of Terri Palmer, including pertinent history and exam findings,  with the resident. I have seen and examined the patient and the key elements of all parts of the encounter have been performed by me. I agree with the assessment, plan and orders as documented by the resident. ([address] [place] [address])  Patient seen and examined along with the resident physicians. Acute Kidney Injury and Chest Pain is resolved and Patient is Homeless. And has no place to go.  working for placement to [facility] as soon arrangements are made probably today or Tomorrow. Continue supportive care. Elise Haddad.

## 2021-02-09 NOTE — FLOWSHEET NOTE
Patient has blanket over head stating that anxiety pill is not working emotional support provided, bed locked and in low position and call light and bedside table within reach bed alarm on notified that medication takes time to work, pt denies further needs.

## 2021-02-09 NOTE — PROGRESS NOTES
Occupational Therapy   Occupational Therapy Initial Assessment  Date: 2021   Patient Name: Maya Perez  MRN: [id number]     : 1965    Date of Service: 2021    Discharge Recommendations:    Further therapy recommended at discharge. OT Equipment Recommendations  Equipment Needed: Yes  Mobility Devices: ADL Assistive Devices  ADL Assistive Devices: Sock-Aid [place]st;Dressing Stick;Long-handled Shoe Horn;Elastic Shoe Laces; Long-handled Sponge; Shower Chair with back; Hand-held Shower;Grab Bars - toilet;Grab Bars - shower    Assessment   Performance deficits / Impairments: Decreased functional mobility ; Decreased endurance;Decreased balance;Decreased high-level IADLs;Decreased ADL status; Decreased sensation  Prognosis: Good  Decision Making: Medium Complexity  OT Education: OT Role;Plan of Care;Transfer Training  Patient Education: OT role, POC, body position during stand-pivot and stand-step transfer. Good return  REQUIRES OT FOLLOW UP: Yes  Activity Tolerance  Activity Tolerance: Patient Tolerated treatment well;Patient limited by pain  Safety Devices  Safety Devices in place: Yes  Type of devices: Gait belt;Patient at risk for falls;Call light within reach; Left in bed; bed alarm  Restraints  Initially in place: No           Patient Diagnosis(es): The primary encounter diagnosis was Hyperglycemia. Diagnoses of Metabolic acidosis, Chest pain, unspecified type, and MARTIN (acute kidney injury) ([address] were also pertinent to this visit.      has a past medical history of Acid reflux, Acute cystitis with hematuria, Acute non-recurrent maxillary sinusitis, Asthma, Bipolar 1 disorder ([address] Bipolar disorder, mixed ([address] BMI 34.0-34.9,adult, Cannabis use disorder, severe, dependence ([address] Cerebrovascular accident (CVA) ([address] Chest pain, Chronic renal insufficiency, Cocaine abuse ([address] DDD (degenerative disc disease), cervical, Diabetes mellitus ([address] Dizziness, Fibromyalgia, History of stroke, Homicidal ideation, Strength  Gross LUE Strength: Exceptions to [facility]  L Shoulder Flex: 4/5  L Shoulder Ext: 4/5  L Elbow Flex: 4/5  L Elbow Ext: 4/5  L Hand General: 4/5  RUE Strength  Gross RUE Strength: Exceptions to [facility]  R Shoulder Flex: 4/5  R Shoulder Ext: 4/5  R Elbow Flex: 4/5  R Elbow Ext: 4/5  R Hand General: 4/5    Plan   Plan  Times per week: 3-4x/wk    AM-PAC Score  AM-[facility] Inpatient Daily Activity Raw Score: 17 (02/09/21 0921)  AM-PAC Inpatient ADL T-Scale Score : 37.26 (02/09/21 0921)  ADL Inpatient CMS 0-100% Score: 50.11 (02/09/21 0921)  ADL Inpatient CMS G-Code Modifier : CK (02/09/21 0921)    Goals  Short term goals  Time Frame for Short term goals: pt will, by discharge  Short term goal 1: dem UB ADL independently with AE PRN  Short term goal 2: dem LB ADL with min A and AE PRN  Short term goal 3: complete functional mobility/transfer with min A and LRD PRN  Short term goal 4: demo understanding of sock-aid and shoe horn for donning/doffing shoes and socks. Short term goal 5: complete functional activity while standing with LRD and min A for 5+ minutes.      Therapy Time   Individual Concurrent Group Co-treatment   Time In 0827         Time Out 0900         Minutes 33         Timed Code Treatment Minutes: 0549 Idalia Browne

## 2021-02-09 NOTE — PLAN OF CARE
Problem: Health Behavior:  Goal: Ability to identify and utilize available support systems will improve  Description: Ability to identify and utilize available support systems will improve  Outcome: Ongoing  Goal: Compliance with therapeutic regimen will improve  Description: Compliance with therapeutic regimen will improve  Outcome: Ongoing  Goal: Ability to keep healthcare appointments will improve  Description: Ability to keep healthcare appointments will improve  Outcome: Ongoing     Problem: Falls - Risk of:  Goal: Will remain free from falls  Description: Will remain free from falls  Outcome: Ongoing  Goal: Absence of physical injury  Description: Absence of physical injury  Outcome: Ongoing     Problem: Discharge Planning:  Goal: Discharged to appropriate level of care  Description: Discharged to appropriate level of care  Outcome: Ongoing     Problem: Serum Glucose Level - Abnormal:  Goal: Ability to maintain appropriate glucose levels will improve  Description: Ability to maintain appropriate glucose levels will improve  Outcome: Ongoing     Problem: Sensory Perception - Impaired:  Goal: Ability to maintain a stable neurologic state will improve  Description: Ability to maintain a stable neurologic state will improve  Outcome: Ongoing     Problem: Fluid Volume:  Goal: Will show no signs or symptoms of fluid imbalance  Description: Will show no signs or symptoms of fluid imbalance  Outcome: Ongoing     Problem: Pain:  Goal: Pain level will decrease  Description: Pain level will decrease  Outcome: Ongoing  Goal: Control of acute pain  Description: Control of acute pain  Outcome: Ongoing  Goal: Control of chronic pain  Description: Control of chronic pain  Outcome: Ongoing

## 2021-02-09 NOTE — FLOWSHEET NOTE
Assessment: Suzanna Noble is a 50 y.o. female. Per report, Patient with bipolar disorder, cocaine abuse, insulin-dependent diabetic states she has been taking her insulin for an unknown amount of time and has been checking her blood sugar. Pt not feeling very well. Intervention:  was rounding on floor.  offered words of courage and support.  nurtured hope and will follow up as needed. Outcome: Chaplains are available 24/7 [facility] [employer].         02/09/21 0895   Encounter Summary   Services provided to: Patient   Referral/Consult From: Zac   Continue Visiting   (2/8/21)   Complexity of Encounter Low   Length of Encounter 15 minutes   Spiritual/Taoism   Type Spiritual support   Assessment Approachable   Intervention Explored feelings, thoughts, concerns   Outcome Comfort

## 2021-02-10 VITALS
WEIGHT: 246 LBS | BODY MASS INDEX: 39.53 KG/M2 | SYSTOLIC BLOOD PRESSURE: 116 MMHG | RESPIRATION RATE: 14 BRPM | OXYGEN SATURATION: 96 % | HEIGHT: 66 IN | TEMPERATURE: 97.3 F | DIASTOLIC BLOOD PRESSURE: 63 MMHG | HEART RATE: 82 BPM

## 2021-02-10 LAB
ANION GAP SERPL CALCULATED.3IONS-SCNC: 11 MMOL/L (ref 9–17)
BUN BLDV-MCNC: 10 MG/DL (ref 6–20)
BUN/CREAT BLD: ABNORMAL (ref 9–20)
CALCIUM SERPL-MCNC: 10.1 MG/DL (ref 8.6–10.4)
CHLORIDE BLD-SCNC: 99 MMOL/L (ref 98–107)
CO2: 26 MMOL/L (ref 20–31)
CREAT SERPL-MCNC: 0.98 MG/DL (ref 0.5–0.9)
GFR AFRICAN AMERICAN: >60 ML/MIN
GFR NON-AFRICAN AMERICAN: 59 ML/MIN
GFR SERPL CREATININE-BSD FRML MDRD: ABNORMAL ML/MIN/{1.73_M2}
GFR SERPL CREATININE-BSD FRML MDRD: ABNORMAL ML/MIN/{1.73_M2}
GLUCOSE BLD-MCNC: 328 MG/DL (ref 65–105)
GLUCOSE BLD-MCNC: 387 MG/DL (ref 70–99)
HCT VFR BLD CALC: 35.7 % (ref 36.3–47.1)
HEMOGLOBIN: 11.1 G/DL (ref 11.9–15.1)
MCH RBC QN AUTO: 31 PG (ref 25.2–33.5)
MCHC RBC AUTO-ENTMCNC: 31.1 G/DL (ref 28.4–34.8)
MCV RBC AUTO: 99.7 FL (ref 82.6–102.9)
NRBC AUTOMATED: 0.4 PER 100 WBC
PDW BLD-RTO: 12.7 % (ref 11.8–14.4)
PLATELET # BLD: 323 K/UL (ref 138–453)
PMV BLD AUTO: 10.8 FL (ref 8.1–13.5)
POTASSIUM SERPL-SCNC: 4.8 MMOL/L (ref 3.7–5.3)
RBC # BLD: 3.58 M/UL (ref 3.95–5.11)
SODIUM BLD-SCNC: 136 MMOL/L (ref 135–144)
TROPONIN INTERP: NORMAL
TROPONIN INTERP: NORMAL
TROPONIN T: NORMAL NG/ML
TROPONIN T: NORMAL NG/ML
TROPONIN, HIGH SENSITIVITY: 10 NG/L (ref 0–14)
TROPONIN, HIGH SENSITIVITY: 13 NG/L (ref 0–14)
WBC # BLD: 7.5 K/UL (ref 3.5–11.3)

## 2021-02-10 PROCEDURE — 6370000000 HC RX 637 (ALT 250 FOR IP): Performed by: STUDENT IN AN ORGANIZED HEALTH CARE EDUCATION/TRAINING PROGRAM

## 2021-02-10 PROCEDURE — 84484 ASSAY OF TROPONIN QUANT: CPT

## 2021-02-10 PROCEDURE — 80048 BASIC METABOLIC PNL TOTAL CA: CPT

## 2021-02-10 PROCEDURE — 82947 ASSAY GLUCOSE BLOOD QUANT: CPT

## 2021-02-10 PROCEDURE — 85027 COMPLETE CBC AUTOMATED: CPT

## 2021-02-10 PROCEDURE — 94640 AIRWAY INHALATION TREATMENT: CPT

## 2021-02-10 PROCEDURE — 36415 COLL VENOUS BLD VENIPUNCTURE: CPT

## 2021-02-10 RX ADMIN — GABAPENTIN 900 MG: 600 TABLET ORAL at 08:10

## 2021-02-10 RX ADMIN — BUDESONIDE AND FORMOTEROL FUMARATE DIHYDRATE 2 PUFF: 80; 4.5 AEROSOL RESPIRATORY (INHALATION) at 08:30

## 2021-02-10 RX ADMIN — ARIPIPRAZOLE 10 MG: 10 TABLET ORAL at 08:10

## 2021-02-10 RX ADMIN — VENLAFAXINE HYDROCHLORIDE 150 MG: 150 CAPSULE, EXTENDED RELEASE ORAL at 08:10

## 2021-02-10 RX ADMIN — FAMOTIDINE 20 MG: 20 TABLET, FILM COATED ORAL at 08:10

## 2021-02-10 RX ADMIN — INSULIN LISPRO 8 UNITS: 100 INJECTION, SOLUTION INTRAVENOUS; SUBCUTANEOUS at 08:11

## 2021-02-10 NOTE — DISCHARGE SUMMARY
times daily             gabapentin (NEURONTIN) 300 MG capsule  TAKE 3 CAPSULES 900 MG BY MOUTH THREE TIMES DAILY             insulin glargine (LANTUS SOLOSTAR) 100 UNIT/ML injection pen  Inject 15 Units into the skin nightly             Insulin Pen Needle ([employer] PEN NEEDLES 31G) 31G X 8 MM MISC  1 each by Does not apply route daily             melatonin (RA MELATONIN) 3 MG TABS tablet  Take 1 tablet by mouth daily             metFORMIN (GLUCOPHAGE) 1000 MG tablet  TAKE 1 TABLET BY MOUTH DAILY WITH BREAKFAST             Misc. Devices MISC  1 pair of dm shoes, 3 accommodated inserts             omeprazole (PRILOSEC) 20 MG delayed release capsule  Take 1 capsule by mouth Daily             SITagliptin (JANUVIA) 100 MG tablet  Take 1 tablet by mouth daily             traZODone (DESYREL) 150 MG tablet  Take 1 tablet by mouth nightly             venlafaxine (EFFEXOR XR) 150 MG extended release capsule  Take 1 capsule by mouth daily (with breakfast)                 Send Copies to: Zoey Monroe MD, Whit Lechuga      Note that over 30 minutes was spent in preparing discharge papers, discussing discharge with patient and family, medication review, etc.    Signed:   Broderick Centeno MD  Family medicine Resident  2/10/2021 3:13 PM

## 2021-02-10 NOTE — PLAN OF CARE
Problem: Health Behavior:  Goal: Ability to identify and utilize available support systems will improve  Description: Ability to identify and utilize available support systems will improve  Outcome: Ongoing  Goal: Compliance with therapeutic regimen will improve  Description: Compliance with therapeutic regimen will improve  Outcome: Ongoing  Goal: Ability to keep healthcare appointments will improve  Description: Ability to keep healthcare appointments will improve  Outcome: Ongoing     Problem: Falls - Risk of:  Goal: Will remain free from falls  Description: Will remain free from falls  Outcome: Ongoing  Goal: Absence of physical injury  Description: Absence of physical injury  Outcome: Ongoing     Problem: Discharge Planning:  Goal: Discharged to appropriate level of care  Description: Discharged to appropriate level of care  Outcome: Ongoing     Problem: Discharge Planning:  Goal: Discharged to appropriate level of care  Description: Discharged to appropriate level of care  Outcome: Ongoing     Problem: Serum Glucose Level - Abnormal:  Goal: Ability to maintain appropriate glucose levels will improve  Description: Ability to maintain appropriate glucose levels will improve  Outcome: Ongoing     Problem: Sensory Perception - Impaired:  Goal: Ability to maintain a stable neurologic state will improve  Description: Ability to maintain a stable neurologic state will improve  Outcome: Ongoing     Problem: Fluid Volume:  Goal: Will show no signs or symptoms of fluid imbalance  Description: Will show no signs or symptoms of fluid imbalance  Outcome: Ongoing     Problem: Pain:  Goal: Pain level will decrease  Description: Pain level will decrease  Outcome: Ongoing  Goal: Control of acute pain  Description: Control of acute pain  Outcome: Ongoing  Goal: Control of chronic pain  Description: Control of chronic pain  Outcome: Ongoing     Problem: RESPIRATORY  Intervention: Respiratory assessment  2/10/2021 0420 by Barb Johnston RCP  Note: BRONCHOSPASM/BRONCHOCONSTRICTION     []         IMPROVE AERATION/BREATH SOUNDS  []   ADMINISTER BRONCHODILATOR THERAPY AS APPROPRIATE  []   ASSESS BREATH SOUNDS  []   IMPLEMENT AEROSOL/MDI PROTOCOL  []   PATIENT EDUCATION AS NEEDED

## 2021-02-10 NOTE — PLAN OF CARE
Problem: Health Behavior:  Goal: Ability to identify and utilize available support systems will improve  Description: Ability to identify and utilize available support systems will improve  2/10/2021 0914 by Marcelle Beltrán RN  Outcome: Completed  2/10/2021 0421 by Mariah Haywood RN  Outcome: Ongoing  Goal: Compliance with therapeutic regimen will improve  Description: Compliance with therapeutic regimen will improve  2/10/2021 0914 by Marcelle Beltrán RN  Outcome: Completed  2/10/2021 0421 by Mariah Haywood RN  Outcome: Ongoing  Goal: Ability to keep healthcare appointments will improve  Description: Ability to keep healthcare appointments will improve  2/10/2021 0914 by Marcelle Beltrán RN  Outcome: Completed  2/10/2021 0421 by Mariah Haywood RN  Outcome: Ongoing     Problem: Falls - Risk of:  Goal: Will remain free from falls  Description: Will remain free from falls  2/10/2021 0914 by Marcelle Beltrán RN  Outcome: Completed  2/10/2021 0421 by Mariah Haywood RN  Outcome: Ongoing  Goal: Absence of physical injury  Description: Absence of physical injury  2/10/2021 0914 by Marcelle Beltrán RN  Outcome: Completed  2/10/2021 0421 by Mariah Haywood RN  Outcome: Ongoing     Problem: Discharge Planning:  Goal: Discharged to appropriate level of care  Description: Discharged to appropriate level of care  2/10/2021 0914 by Marcelle Beltrán RN  Outcome: Completed  2/10/2021 0421 by Mariah Haywood RN  Outcome: Ongoing     Problem: Serum Glucose Level - Abnormal:  Goal: Ability to maintain appropriate glucose levels will improve  Description: Ability to maintain appropriate glucose levels will improve  2/10/2021 0914 by Marcelle Beltrán RN  Outcome: Completed  2/10/2021 0421 by Mariah Haywood RN  Outcome: Ongoing     Problem: Sensory Perception - Impaired:  Goal: Ability to maintain a stable neurologic state will improve  Description: Ability to maintain a stable neurologic state will improve  2/10/2021 0914 by Marcelle Beltrán RN  Outcome: Completed  2/10/2021 0421 by Steve Foster RN  Outcome: Ongoing     Problem: Fluid Volume:  Goal: Will show no signs or symptoms of fluid imbalance  Description: Will show no signs or symptoms of fluid imbalance  2/10/2021 0914 by Carito Odell RN  Outcome: Completed  2/10/2021 0421 by Steve Foster RN  Outcome: Ongoing     Problem: Pain:  Goal: Pain level will decrease  Description: Pain level will decrease  2/10/2021 0914 by Carito Odell RN  Outcome: Completed  2/10/2021 0421 by Steve Foster RN  Outcome: Ongoing  Goal: Control of acute pain  Description: Control of acute pain  2/10/2021 0914 by Carito Odell RN  Outcome: Completed  2/10/2021 0421 by Steve Foster RN  Outcome: Ongoing  Goal: Control of chronic pain  Description: Control of chronic pain  2/10/2021 0914 by Carito Odell RN  Outcome: Completed  2/10/2021 0421 by Steve Foster RN  Outcome: Ongoing

## 2021-02-11 ENCOUNTER — HOSPITAL ENCOUNTER (OUTPATIENT)
Age: 56
Setting detail: SPECIMEN
Discharge: HOME | End: 2021-02-11
Payer: COMMERCIAL

## 2021-02-11 LAB
ANION GAP SERPL CALCULATED.3IONS-SCNC: 13 MMOL/L (ref 9–17)
BUN BLDV-MCNC: 13 MG/DL (ref 6–20)
BUN/CREAT BLD: 14 (ref 9–20)
CALCIUM SERPL-MCNC: 9.5 MG/DL (ref 8.6–10.4)
CHLORIDE BLD-SCNC: 96 MMOL/L (ref 98–107)
CO2: 26 MMOL/L (ref 20–31)
CREAT SERPL-MCNC: 0.91 MG/DL (ref 0.5–0.9)
GFR AFRICAN AMERICAN: >60 ML/MIN
GFR NON-AFRICAN AMERICAN: >60 ML/MIN
GFR SERPL CREATININE-BSD FRML MDRD: ABNORMAL ML/MIN/{1.73_M2}
GFR SERPL CREATININE-BSD FRML MDRD: ABNORMAL ML/MIN/{1.73_M2}
GLUCOSE BLD-MCNC: 461 MG/DL (ref 70–99)
HCT VFR BLD CALC: 36.9 % (ref 36.3–47.1)
HEMOGLOBIN: 11.6 G/DL (ref 11.9–15.1)
MCH RBC QN AUTO: 31.3 PG (ref 25.2–33.5)
MCHC RBC AUTO-ENTMCNC: 31.4 G/DL (ref 28.4–34.8)
MCV RBC AUTO: 99.5 FL (ref 82.6–102.9)
NRBC AUTOMATED: ABNORMAL PER 100 WBC
PDW BLD-RTO: 12.8 % (ref 11.8–14.4)
PLATELET # BLD: 290 K/UL (ref 138–453)
PMV BLD AUTO: 11.6 FL (ref 8.1–13.5)
POTASSIUM SERPL-SCNC: 4.8 MMOL/L (ref 3.7–5.3)
RBC # BLD: 3.71 M/UL (ref 3.95–5.11)
SODIUM BLD-SCNC: 135 MMOL/L (ref 135–144)
WBC # BLD: 6.6 K/UL (ref 3.5–11.3)

## 2021-02-11 PROCEDURE — 85027 COMPLETE CBC AUTOMATED: CPT

## 2021-02-11 PROCEDURE — 80048 BASIC METABOLIC PNL TOTAL CA: CPT

## 2021-02-11 PROCEDURE — 36415 COLL VENOUS BLD VENIPUNCTURE: CPT

## 2021-02-18 ENCOUNTER — HOSPITAL ENCOUNTER (OUTPATIENT)
Age: 56
Setting detail: SPECIMEN
Discharge: HOME | End: 2021-02-18
Payer: COMMERCIAL

## 2021-02-18 LAB
ANION GAP SERPL CALCULATED.3IONS-SCNC: 12 MMOL/L (ref 9–17)
BUN BLDV-MCNC: 18 MG/DL (ref 6–20)
BUN/CREAT BLD: ABNORMAL (ref 9–20)
CALCIUM SERPL-MCNC: 9.4 MG/DL (ref 8.6–10.4)
CHLORIDE BLD-SCNC: 98 MMOL/L (ref 98–107)
CO2: 26 MMOL/L (ref 20–31)
CREAT SERPL-MCNC: 0.86 MG/DL (ref 0.5–0.9)
GFR AFRICAN AMERICAN: >60 ML/MIN
GFR NON-AFRICAN AMERICAN: >60 ML/MIN
GFR SERPL CREATININE-BSD FRML MDRD: ABNORMAL ML/MIN/{1.73_M2}
GFR SERPL CREATININE-BSD FRML MDRD: ABNORMAL ML/MIN/{1.73_M2}
GLUCOSE BLD-MCNC: 243 MG/DL (ref 70–99)
HCT VFR BLD CALC: 31.1 % (ref 36.3–47.1)
HEMOGLOBIN: 9.3 G/DL (ref 11.9–15.1)
MCH RBC QN AUTO: 30.9 PG (ref 25.2–33.5)
MCHC RBC AUTO-ENTMCNC: 29.9 G/DL (ref 28.4–34.8)
MCV RBC AUTO: 103.3 FL (ref 82.6–102.9)
NRBC AUTOMATED: 0.3 PER 100 WBC
PDW BLD-RTO: 12.9 % (ref 11.8–14.4)
PLATELET # BLD: 357 K/UL (ref 138–453)
PMV BLD AUTO: 11.1 FL (ref 8.1–13.5)
POTASSIUM SERPL-SCNC: 4.7 MMOL/L (ref 3.7–5.3)
RBC # BLD: 3.01 M/UL (ref 3.95–5.11)
SODIUM BLD-SCNC: 136 MMOL/L (ref 135–144)
WBC # BLD: 7.6 K/UL (ref 3.5–11.3)

## 2021-02-18 PROCEDURE — 80048 BASIC METABOLIC PNL TOTAL CA: CPT

## 2021-02-18 PROCEDURE — 36415 COLL VENOUS BLD VENIPUNCTURE: CPT

## 2021-02-18 PROCEDURE — P9603 ONE-WAY ALLOW PRORATED MILES: HCPCS

## 2021-02-18 PROCEDURE — 85027 COMPLETE CBC AUTOMATED: CPT

## 2021-02-19 ENCOUNTER — HOSPITAL ENCOUNTER (OUTPATIENT)
Age: 56
Setting detail: SPECIMEN
Discharge: HOME | End: 2021-02-19
Payer: COMMERCIAL

## 2021-02-19 LAB — URIC ACID: 4.7 MG/DL (ref 2.4–5.7)

## 2021-02-19 PROCEDURE — P9603 ONE-WAY ALLOW PRORATED MILES: HCPCS

## 2021-02-19 PROCEDURE — 36415 COLL VENOUS BLD VENIPUNCTURE: CPT

## 2021-02-19 PROCEDURE — 84550 ASSAY OF BLOOD/URIC ACID: CPT

## 2021-02-22 ENCOUNTER — TELEPHONE (OUTPATIENT)
Dept: FAMILY MEDICINE CLINIC | Age: 56
End: 2021-02-22

## 2021-02-22 NOTE — TELEPHONE ENCOUNTER
4 wk f/u for diabetes. 2/19/21 - NO SHOW - Spoke with patient states she fell and twisted her leg and is in [facility] for therapy and she will call us when she can walk again.

## 2021-02-23 ENCOUNTER — APPOINTMENT (OUTPATIENT)
Dept: GENERAL RADIOLOGY | Age: 56
DRG: 364 | End: 2021-02-23
Payer: COMMERCIAL

## 2021-02-23 ENCOUNTER — HOSPITAL ENCOUNTER (INPATIENT)
Age: 56
LOS: 1 days | Discharge: NURSING, REHAB OR LONG-TERM CARE | DRG: 364 | End: 2021-02-25
Attending: EMERGENCY MEDICINE | Admitting: EMERGENCY MEDICINE
Payer: COMMERCIAL

## 2021-02-23 DIAGNOSIS — L03.019 FELON OF FINGER: Primary | ICD-10-CM

## 2021-02-23 LAB
-: NORMAL
ABSOLUTE EOS #: <0.03 K/UL (ref 0–0.44)
ABSOLUTE IMMATURE GRANULOCYTE: 0.12 K/UL (ref 0–0.3)
ABSOLUTE LYMPH #: 0.96 K/UL (ref 1.1–3.7)
ABSOLUTE MONO #: 0.29 K/UL (ref 0.1–1.2)
ANION GAP SERPL CALCULATED.3IONS-SCNC: 11 MMOL/L (ref 9–17)
BASOPHILS # BLD: 0 % (ref 0–2)
BASOPHILS ABSOLUTE: 0.03 K/UL (ref 0–0.2)
BUN BLDV-MCNC: 21 MG/DL (ref 6–20)
BUN/CREAT BLD: ABNORMAL (ref 9–20)
C-REACTIVE PROTEIN: 12.9 MG/L (ref 0–5)
CALCIUM SERPL-MCNC: 8.7 MG/DL (ref 8.6–10.4)
CHLORIDE BLD-SCNC: 97 MMOL/L (ref 98–107)
CO2: 28 MMOL/L (ref 20–31)
CREAT SERPL-MCNC: 0.87 MG/DL (ref 0.5–0.9)
DIFFERENTIAL TYPE: ABNORMAL
EOSINOPHILS RELATIVE PERCENT: 0 % (ref 1–4)
GFR AFRICAN AMERICAN: >60 ML/MIN
GFR NON-AFRICAN AMERICAN: >60 ML/MIN
GFR SERPL CREATININE-BSD FRML MDRD: ABNORMAL ML/MIN/{1.73_M2}
GFR SERPL CREATININE-BSD FRML MDRD: ABNORMAL ML/MIN/{1.73_M2}
GLUCOSE BLD-MCNC: 356 MG/DL (ref 65–105)
GLUCOSE BLD-MCNC: 403 MG/DL (ref 70–99)
HCT VFR BLD CALC: 31.8 % (ref 36.3–47.1)
HEMOGLOBIN: 9.5 G/DL (ref 11.9–15.1)
IMMATURE GRANULOCYTES: 2 %
LYMPHOCYTES # BLD: 13 % (ref 24–43)
MCH RBC QN AUTO: 31.7 PG (ref 25.2–33.5)
MCHC RBC AUTO-ENTMCNC: 29.9 G/DL (ref 28.4–34.8)
MCV RBC AUTO: 106 FL (ref 82.6–102.9)
MONOCYTES # BLD: 4 % (ref 3–12)
NRBC AUTOMATED: 0.5 PER 100 WBC
PDW BLD-RTO: 13.3 % (ref 11.8–14.4)
PLATELET # BLD: 454 K/UL (ref 138–453)
PLATELET ESTIMATE: ABNORMAL
PMV BLD AUTO: 10.7 FL (ref 8.1–13.5)
POTASSIUM SERPL-SCNC: 5 MMOL/L (ref 3.7–5.3)
RBC # BLD: 3 M/UL (ref 3.95–5.11)
RBC # BLD: ABNORMAL 10*6/UL
REASON FOR REJECTION: NORMAL
SARS-COV-2, RAPID: NOT DETECTED
SEDIMENTATION RATE, ERYTHROCYTE: 100 MM (ref 0–30)
SEG NEUTROPHILS: 81 % (ref 36–65)
SEGMENTED NEUTROPHILS ABSOLUTE COUNT: 5.88 K/UL (ref 1.5–8.1)
SODIUM BLD-SCNC: 136 MMOL/L (ref 135–144)
SPECIMEN DESCRIPTION: NORMAL
WBC # BLD: 7.3 K/UL (ref 3.5–11.3)
WBC # BLD: ABNORMAL 10*3/UL
ZZ NTE CLEAN UP: ORDERED TEST: NORMAL
ZZ NTE WITH NAME CLEAN UP: SPECIMEN SOURCE: NORMAL

## 2021-02-23 PROCEDURE — 96367 TX/PROPH/DG ADDL SEQ IV INF: CPT

## 2021-02-23 PROCEDURE — U0002 COVID-19 LAB TEST NON-CDC: HCPCS

## 2021-02-23 PROCEDURE — 80048 BASIC METABOLIC PNL TOTAL CA: CPT

## 2021-02-23 PROCEDURE — 96375 TX/PRO/DX INJ NEW DRUG ADDON: CPT

## 2021-02-23 PROCEDURE — 73130 X-RAY EXAM OF HAND: CPT

## 2021-02-23 PROCEDURE — 96376 TX/PRO/DX INJ SAME DRUG ADON: CPT

## 2021-02-23 PROCEDURE — 10060 I&D ABSCESS SIMPLE/SINGLE: CPT

## 2021-02-23 PROCEDURE — 85652 RBC SED RATE AUTOMATED: CPT

## 2021-02-23 PROCEDURE — 82947 ASSAY GLUCOSE BLOOD QUANT: CPT

## 2021-02-23 PROCEDURE — G0378 HOSPITAL OBSERVATION PER HR: HCPCS

## 2021-02-23 PROCEDURE — 96366 THER/PROPH/DIAG IV INF ADDON: CPT

## 2021-02-23 PROCEDURE — 6370000000 HC RX 637 (ALT 250 FOR IP): Performed by: STUDENT IN AN ORGANIZED HEALTH CARE EDUCATION/TRAINING PROGRAM

## 2021-02-23 PROCEDURE — 2580000003 HC RX 258: Performed by: STUDENT IN AN ORGANIZED HEALTH CARE EDUCATION/TRAINING PROGRAM

## 2021-02-23 PROCEDURE — 99285 EMERGENCY DEPT VISIT HI MDM: CPT

## 2021-02-23 PROCEDURE — 2500000003 HC RX 250 WO HCPCS: Performed by: STUDENT IN AN ORGANIZED HEALTH CARE EDUCATION/TRAINING PROGRAM

## 2021-02-23 PROCEDURE — 6360000002 HC RX W HCPCS: Performed by: STUDENT IN AN ORGANIZED HEALTH CARE EDUCATION/TRAINING PROGRAM

## 2021-02-23 PROCEDURE — 96365 THER/PROPH/DIAG IV INF INIT: CPT

## 2021-02-23 PROCEDURE — 73080 X-RAY EXAM OF ELBOW: CPT

## 2021-02-23 PROCEDURE — 85025 COMPLETE CBC W/AUTO DIFF WBC: CPT

## 2021-02-23 PROCEDURE — 86140 C-REACTIVE PROTEIN: CPT

## 2021-02-23 RX ORDER — LANOLIN ALCOHOL/MO/W.PET/CERES
3 CREAM (GRAM) TOPICAL DAILY
Status: DISCONTINUED | OUTPATIENT
Start: 2021-02-23 | End: 2021-02-25 | Stop reason: HOSPADM

## 2021-02-23 RX ORDER — FENTANYL CITRATE 50 UG/ML
100 INJECTION, SOLUTION INTRAMUSCULAR; INTRAVENOUS ONCE
Status: COMPLETED | OUTPATIENT
Start: 2021-02-23 | End: 2021-02-23

## 2021-02-23 RX ORDER — MORPHINE SULFATE 4 MG/ML
4 INJECTION, SOLUTION INTRAMUSCULAR; INTRAVENOUS ONCE
Status: COMPLETED | OUTPATIENT
Start: 2021-02-23 | End: 2021-02-23

## 2021-02-23 RX ORDER — ALOGLIPTIN 12.5 MG/1
25 TABLET, FILM COATED ORAL DAILY
Status: DISCONTINUED | OUTPATIENT
Start: 2021-02-23 | End: 2021-02-25 | Stop reason: HOSPADM

## 2021-02-23 RX ORDER — OXYCODONE HYDROCHLORIDE 5 MG/1
5 TABLET ORAL EVERY 4 HOURS PRN
Status: DISCONTINUED | OUTPATIENT
Start: 2021-02-23 | End: 2021-02-24

## 2021-02-23 RX ORDER — SODIUM CHLORIDE 0.9 % (FLUSH) 0.9 %
10 SYRINGE (ML) INJECTION EVERY 12 HOURS SCHEDULED
Status: DISCONTINUED | OUTPATIENT
Start: 2021-02-23 | End: 2021-02-25 | Stop reason: HOSPADM

## 2021-02-23 RX ORDER — PANTOPRAZOLE SODIUM 40 MG/1
40 TABLET, DELAYED RELEASE ORAL
Status: DISCONTINUED | OUTPATIENT
Start: 2021-02-24 | End: 2021-02-25 | Stop reason: HOSPADM

## 2021-02-23 RX ORDER — INSULIN GLARGINE 100 [IU]/ML
15 INJECTION, SOLUTION SUBCUTANEOUS NIGHTLY
Status: DISCONTINUED | OUTPATIENT
Start: 2021-02-23 | End: 2021-02-25 | Stop reason: HOSPADM

## 2021-02-23 RX ORDER — CETIRIZINE HYDROCHLORIDE 10 MG/1
10 TABLET ORAL DAILY
Status: DISCONTINUED | OUTPATIENT
Start: 2021-02-23 | End: 2021-02-25 | Stop reason: HOSPADM

## 2021-02-23 RX ORDER — SODIUM CHLORIDE 0.9 % (FLUSH) 0.9 %
10 SYRINGE (ML) INJECTION PRN
Status: DISCONTINUED | OUTPATIENT
Start: 2021-02-23 | End: 2021-02-25 | Stop reason: HOSPADM

## 2021-02-23 RX ORDER — ACETAMINOPHEN 325 MG/1
650 TABLET ORAL EVERY 4 HOURS PRN
Status: DISCONTINUED | OUTPATIENT
Start: 2021-02-23 | End: 2021-02-24

## 2021-02-23 RX ORDER — OXYCODONE HYDROCHLORIDE 5 MG/1
5 TABLET ORAL ONCE
Status: COMPLETED | OUTPATIENT
Start: 2021-02-23 | End: 2021-02-23

## 2021-02-23 RX ORDER — ARIPIPRAZOLE 10 MG/1
10 TABLET ORAL DAILY
Status: DISCONTINUED | OUTPATIENT
Start: 2021-02-23 | End: 2021-02-25 | Stop reason: HOSPADM

## 2021-02-23 RX ORDER — LIDOCAINE HYDROCHLORIDE 10 MG/ML
20 INJECTION, SOLUTION INFILTRATION; PERINEURAL ONCE
Status: COMPLETED | OUTPATIENT
Start: 2021-02-23 | End: 2021-02-23

## 2021-02-23 RX ORDER — VENLAFAXINE HYDROCHLORIDE 150 MG/1
150 CAPSULE, EXTENDED RELEASE ORAL
Status: DISCONTINUED | OUTPATIENT
Start: 2021-02-24 | End: 2021-02-25 | Stop reason: HOSPADM

## 2021-02-23 RX ADMIN — FENTANYL CITRATE 100 MCG: 50 INJECTION, SOLUTION INTRAMUSCULAR; INTRAVENOUS at 18:46

## 2021-02-23 RX ADMIN — LIDOCAINE HYDROCHLORIDE 20 ML: 10 INJECTION, SOLUTION INFILTRATION; PERINEURAL at 17:54

## 2021-02-23 RX ADMIN — SODIUM CHLORIDE, PRESERVATIVE FREE 10 ML: 5 INJECTION INTRAVENOUS at 21:13

## 2021-02-23 RX ADMIN — OXYCODONE HYDROCHLORIDE 5 MG: 5 TABLET ORAL at 17:51

## 2021-02-23 RX ADMIN — Medication 3 MG: at 21:06

## 2021-02-23 RX ADMIN — MORPHINE SULFATE 4 MG: 4 INJECTION INTRAVENOUS at 20:23

## 2021-02-23 RX ADMIN — CEFTRIAXONE SODIUM 1000 MG: 1 INJECTION, POWDER, FOR SOLUTION INTRAMUSCULAR; INTRAVENOUS at 18:49

## 2021-02-23 RX ADMIN — INSULIN GLARGINE 15 UNITS: 100 INJECTION, SOLUTION SUBCUTANEOUS at 21:06

## 2021-02-23 RX ADMIN — TRAZODONE HYDROCHLORIDE 150 MG: 100 TABLET ORAL at 21:06

## 2021-02-23 RX ADMIN — MORPHINE SULFATE 4 MG: 4 INJECTION, SOLUTION INTRAMUSCULAR; INTRAVENOUS at 16:51

## 2021-02-23 RX ADMIN — VANCOMYCIN HYDROCHLORIDE 2000 MG: 1 INJECTION, POWDER, LYOPHILIZED, FOR SOLUTION INTRAVENOUS at 20:37

## 2021-02-23 RX ADMIN — CETIRIZINE HYDROCHLORIDE 10 MG: 10 TABLET ORAL at 21:06

## 2021-02-23 ASSESSMENT — PAIN SCALES - GENERAL
PAINLEVEL_OUTOF10: 10
PAINLEVEL_OUTOF10: 3
PAINLEVEL_OUTOF10: 10

## 2021-02-23 ASSESSMENT — PAIN DESCRIPTION - PAIN TYPE
TYPE: ACUTE PAIN
TYPE: ACUTE PAIN

## 2021-02-23 ASSESSMENT — PAIN DESCRIPTION - ORIENTATION: ORIENTATION: LEFT

## 2021-02-23 ASSESSMENT — PAIN DESCRIPTION - PROGRESSION: CLINICAL_PROGRESSION: RAPIDLY IMPROVING

## 2021-02-23 NOTE — ED PROVIDER NOTES
the HPI, Physical Exam and Medical Decision Making performed by medical students or scribes is based on my personal performance of the HPI, PE and MDM. For Phys Assistant/ Nurse Practitioner cases/documentation I have had a face to face evaluation of this patient and have completed at least one if not all key elements of the E/M (history, physical exam, and MDM). Additional findings are as noted. For APC cases I have personally evaluated and examined the patient in conjunction with the APC and agree with the treatment plan and disposition of the patient as recorded by the APC.     Skye Philip MD  Attending Emergency  Physician       Elvin Hardy MD  02/23/21 [id number]

## 2021-02-23 NOTE — ED TRIAGE NOTES
Pt arrived to the ED via squad with c/o left hand pain and elbow pain. Pt states she fell two weeks and develop a sore on her finger. Pt states this has progressively gotten worse and her assisted living nurse said she needs to be seen. Pt states pain is 10/10. Pt is alert and oriented x4 VSS. Will continue to monitor while in triage waiting room.

## 2021-02-23 NOTE — ED NOTES
Dr. Sylvester Vargas and Dr. Geroge Severin at bedside for I&D of finger. Pt not tolerating well. Some drainage noted coming from finger.       Rakan Garcia RN  02/23/21 [id number]

## 2021-02-24 ENCOUNTER — APPOINTMENT (OUTPATIENT)
Dept: GENERAL RADIOLOGY | Age: 56
DRG: 364 | End: 2021-02-24
Payer: COMMERCIAL

## 2021-02-24 LAB
GLUCOSE BLD-MCNC: 144 MG/DL (ref 65–105)
GLUCOSE BLD-MCNC: 221 MG/DL (ref 65–105)
GLUCOSE BLD-MCNC: 224 MG/DL (ref 65–105)
GLUCOSE BLD-MCNC: 247 MG/DL (ref 65–105)

## 2021-02-24 PROCEDURE — 6360000002 HC RX W HCPCS: Performed by: EMERGENCY MEDICINE

## 2021-02-24 PROCEDURE — 6370000000 HC RX 637 (ALT 250 FOR IP): Performed by: STUDENT IN AN ORGANIZED HEALTH CARE EDUCATION/TRAINING PROGRAM

## 2021-02-24 PROCEDURE — 96372 THER/PROPH/DIAG INJ SC/IM: CPT

## 2021-02-24 PROCEDURE — 2580000003 HC RX 258: Performed by: STUDENT IN AN ORGANIZED HEALTH CARE EDUCATION/TRAINING PROGRAM

## 2021-02-24 PROCEDURE — 87185 SC STD ENZYME DETCJ PER NZM: CPT

## 2021-02-24 PROCEDURE — 6360000002 HC RX W HCPCS: Performed by: STUDENT IN AN ORGANIZED HEALTH CARE EDUCATION/TRAINING PROGRAM

## 2021-02-24 PROCEDURE — 1200000000 HC SEMI PRIVATE

## 2021-02-24 PROCEDURE — 73630 X-RAY EXAM OF FOOT: CPT

## 2021-02-24 PROCEDURE — 87075 CULTR BACTERIA EXCEPT BLOOD: CPT

## 2021-02-24 PROCEDURE — 6370000000 HC RX 637 (ALT 250 FOR IP): Performed by: EMERGENCY MEDICINE

## 2021-02-24 PROCEDURE — 0J9K0ZZ DRAINAGE OF LEFT HAND SUBCUTANEOUS TISSUE AND FASCIA, OPEN APPROACH: ICD-10-PCS | Performed by: EMERGENCY MEDICINE

## 2021-02-24 PROCEDURE — 87205 SMEAR GRAM STAIN: CPT

## 2021-02-24 PROCEDURE — G0378 HOSPITAL OBSERVATION PER HR: HCPCS

## 2021-02-24 PROCEDURE — 87076 CULTURE ANAEROBE IDENT EACH: CPT

## 2021-02-24 PROCEDURE — 86403 PARTICLE AGGLUT ANTBDY SCRN: CPT

## 2021-02-24 PROCEDURE — 82947 ASSAY GLUCOSE BLOOD QUANT: CPT

## 2021-02-24 PROCEDURE — 96375 TX/PRO/DX INJ NEW DRUG ADDON: CPT

## 2021-02-24 PROCEDURE — 96376 TX/PRO/DX INJ SAME DRUG ADON: CPT

## 2021-02-24 PROCEDURE — 87077 CULTURE AEROBIC IDENTIFY: CPT

## 2021-02-24 PROCEDURE — 99254 IP/OBS CNSLTJ NEW/EST MOD 60: CPT | Performed by: INTERNAL MEDICINE

## 2021-02-24 PROCEDURE — 96366 THER/PROPH/DIAG IV INF ADDON: CPT

## 2021-02-24 PROCEDURE — 87186 SC STD MICRODIL/AGAR DIL: CPT

## 2021-02-24 PROCEDURE — 87070 CULTURE OTHR SPECIMN AEROBIC: CPT

## 2021-02-24 RX ORDER — NICOTINE POLACRILEX 4 MG
15 LOZENGE BUCCAL PRN
Status: DISCONTINUED | OUTPATIENT
Start: 2021-02-24 | End: 2021-02-25 | Stop reason: HOSPADM

## 2021-02-24 RX ORDER — BACITRACIN, NEOMYCIN, POLYMYXIN B 400; 3.5; 5 [USP'U]/G; MG/G; [USP'U]/G
OINTMENT TOPICAL 2 TIMES DAILY
Status: DISCONTINUED | OUTPATIENT
Start: 2021-02-24 | End: 2021-02-25 | Stop reason: HOSPADM

## 2021-02-24 RX ORDER — MORPHINE SULFATE 4 MG/ML
4 INJECTION, SOLUTION INTRAMUSCULAR; INTRAVENOUS EVERY 4 HOURS PRN
Status: DISCONTINUED | OUTPATIENT
Start: 2021-02-24 | End: 2021-02-25 | Stop reason: HOSPADM

## 2021-02-24 RX ORDER — FENTANYL CITRATE 50 UG/ML
50 INJECTION, SOLUTION INTRAMUSCULAR; INTRAVENOUS ONCE
Status: COMPLETED | OUTPATIENT
Start: 2021-02-24 | End: 2021-02-24

## 2021-02-24 RX ORDER — OXYCODONE HYDROCHLORIDE AND ACETAMINOPHEN 5; 325 MG/1; MG/1
2 TABLET ORAL EVERY 6 HOURS PRN
Status: DISCONTINUED | OUTPATIENT
Start: 2021-02-24 | End: 2021-02-25 | Stop reason: HOSPADM

## 2021-02-24 RX ORDER — DEXTROSE MONOHYDRATE 50 MG/ML
100 INJECTION, SOLUTION INTRAVENOUS PRN
Status: DISCONTINUED | OUTPATIENT
Start: 2021-02-24 | End: 2021-02-25 | Stop reason: HOSPADM

## 2021-02-24 RX ORDER — DEXTROSE MONOHYDRATE 25 G/50ML
12.5 INJECTION, SOLUTION INTRAVENOUS PRN
Status: DISCONTINUED | OUTPATIENT
Start: 2021-02-24 | End: 2021-02-25 | Stop reason: HOSPADM

## 2021-02-24 RX ADMIN — OXYCODONE HYDROCHLORIDE 5 MG: 5 TABLET ORAL at 04:09

## 2021-02-24 RX ADMIN — INSULIN LISPRO 2 UNITS: 100 INJECTION, SOLUTION INTRAVENOUS; SUBCUTANEOUS at 23:31

## 2021-02-24 RX ADMIN — OXYCODONE HYDROCHLORIDE 5 MG: 5 TABLET ORAL at 08:35

## 2021-02-24 RX ADMIN — ACETAMINOPHEN 650 MG: 325 TABLET ORAL at 00:07

## 2021-02-24 RX ADMIN — OXYCODONE HYDROCHLORIDE AND ACETAMINOPHEN 2 TABLET: 5; 325 TABLET ORAL at 18:38

## 2021-02-24 RX ADMIN — ENOXAPARIN SODIUM 40 MG: 40 INJECTION SUBCUTANEOUS at 11:25

## 2021-02-24 RX ADMIN — SODIUM CHLORIDE, PRESERVATIVE FREE 10 ML: 5 INJECTION INTRAVENOUS at 21:59

## 2021-02-24 RX ADMIN — VENLAFAXINE HYDROCHLORIDE 150 MG: 150 CAPSULE, EXTENDED RELEASE ORAL at 13:09

## 2021-02-24 RX ADMIN — TRAZODONE HYDROCHLORIDE 150 MG: 100 TABLET ORAL at 21:59

## 2021-02-24 RX ADMIN — MORPHINE SULFATE 4 MG: 4 INJECTION INTRAVENOUS at 09:28

## 2021-02-24 RX ADMIN — Medication 1250 MG: at 20:49

## 2021-02-24 RX ADMIN — ALOGLIPTIN 25 MG: 12.5 TABLET, FILM COATED ORAL at 15:37

## 2021-02-24 RX ADMIN — MORPHINE SULFATE 4 MG: 4 INJECTION INTRAVENOUS at 00:56

## 2021-02-24 RX ADMIN — OXYCODONE HYDROCHLORIDE 5 MG: 5 TABLET ORAL at 00:07

## 2021-02-24 RX ADMIN — FENTANYL CITRATE 50 MCG: 50 INJECTION, SOLUTION INTRAMUSCULAR; INTRAVENOUS at 15:38

## 2021-02-24 RX ADMIN — FENTANYL CITRATE 50 MCG: 50 INJECTION, SOLUTION INTRAMUSCULAR; INTRAVENOUS at 11:12

## 2021-02-24 RX ADMIN — CEFTRIAXONE SODIUM 1000 MG: 1 INJECTION, POWDER, FOR SOLUTION INTRAMUSCULAR; INTRAVENOUS at 23:00

## 2021-02-24 RX ADMIN — METFORMIN HYDROCHLORIDE 1000 MG: 500 TABLET ORAL at 13:10

## 2021-02-24 RX ADMIN — Medication 1250 MG: at 06:33

## 2021-02-24 RX ADMIN — POLYMYXIN B SULFATE, BACITRACIN ZINC, NEOMYCIN SULFATE: 5000; 3.5; 4 OINTMENT TOPICAL at 12:14

## 2021-02-24 RX ADMIN — MORPHINE SULFATE 4 MG: 4 INJECTION INTRAVENOUS at 05:13

## 2021-02-24 RX ADMIN — INSULIN LISPRO 2 UNITS: 100 INJECTION, SOLUTION INTRAVENOUS; SUBCUTANEOUS at 13:10

## 2021-02-24 RX ADMIN — ARIPIPRAZOLE 10 MG: 10 TABLET ORAL at 13:10

## 2021-02-24 RX ADMIN — Medication 3 MG: at 21:58

## 2021-02-24 RX ADMIN — INSULIN LISPRO 4 UNITS: 100 INJECTION, SOLUTION INTRAVENOUS; SUBCUTANEOUS at 17:20

## 2021-02-24 RX ADMIN — SODIUM CHLORIDE, PRESERVATIVE FREE 10 ML: 5 INJECTION INTRAVENOUS at 11:25

## 2021-02-24 RX ADMIN — OXYCODONE HYDROCHLORIDE AND ACETAMINOPHEN 2 TABLET: 5; 325 TABLET ORAL at 12:31

## 2021-02-24 RX ADMIN — INSULIN GLARGINE 15 UNITS: 100 INJECTION, SOLUTION SUBCUTANEOUS at 23:30

## 2021-02-24 RX ADMIN — CETIRIZINE HYDROCHLORIDE 10 MG: 10 TABLET ORAL at 13:10

## 2021-02-24 ASSESSMENT — PAIN SCALES - GENERAL
PAINLEVEL_OUTOF10: 10

## 2021-02-24 ASSESSMENT — ENCOUNTER SYMPTOMS
NAUSEA: 0
EYE PAIN: 0
SORE THROAT: 0
VOMITING: 0
SHORTNESS OF BREATH: 0
RHINORRHEA: 0
COUGH: 0
COLOR CHANGE: 1
FACIAL SWELLING: 0
EYE REDNESS: 0
ABDOMINAL PAIN: 0
EYE ITCHING: 0
DIARRHEA: 0

## 2021-02-24 ASSESSMENT — PAIN DESCRIPTION - LOCATION: LOCATION: FINGER (COMMENT WHICH ONE)

## 2021-02-24 NOTE — PROGRESS NOTES
Report called to Cindy Espinosa RN nightshift RN, pt to go down at shift change.  Electronically signed by Faye Vaca RN on 2/24/2021 at 6:32 PM

## 2021-02-24 NOTE — ED NOTES
The following labs were labeled with patient stickers & tubed to lab;    []Lavender   []On Ice  []Blue  [x]Green/ Yellow  []Green/ Black []On Ice  []Pink  []Red  []Yellow    []COVID-19 Swab []Rapid    []Urine Sample  []Pelvic Cultures    []Blood Cultures       Amanda Lua RN  02/23/21 2012

## 2021-02-24 NOTE — CONSULTS
Plastics Consult Dave Nash    Re: colin left ring finger. Patient states she fell injuring her left ring finger. Over several mendoza she developed pain and swelling in the finger. She presented to [facility] ER last night and was diagnosed with felon of the finger. Felon was drained. PAST MEDICAL / SURGICAL / SOCIAL / FAMILY HISTORY       has a past medical history of Acid reflux, Acute cystitis with hematuria, Acute non-recurrent maxillary sinusitis, Asthma, Bipolar 1 disorder ([address] Bipolar disorder, mixed ([address] BMI 34.0-34.9,adult, Cannabis use disorder, severe, dependence ([address] Cerebrovascular accident (CVA) ([address] Chest pain, Chronic renal insufficiency, Cocaine abuse ([address] DDD (degenerative disc disease), cervical, Diabetes mellitus ([address] Dizziness, Fibromyalgia, History of stroke, Homicidal ideation, Hyperglycemia, Hypertension, Hypotension, IDDM (insulin dependent diabetes mellitus), Lupus ([address] Migraine, Neuropathy, Neuropathy, Polysubstance abuse ([address] Post traumatic stress disorder (PTSD), Posttraumatic stress disorder, Recurrent depression ([address] Recurrent major depressive disorder, in partial remission ([address] Screening mammogram, encounter for, Severe recurrent major depression with psychotic features ([address] Severe recurrent major depression without psychotic features ([address] Stroke (cerebrum) ([address] Stroke ([address] Suicidal ideation, Suicidal intent, Vitamin D deficiency, and White matter changes.      has a past surgical history that includes Abscess Drainage; chest tube insertion; Abdomen surgery;  Cataract removal with implant (Bilateral); and LASIK (Bilateral).     Social History   []Expand by Default            Socioeconomic History    Marital status: Legally        Spouse name: Not on file    Number of children: Not on file    Years of education: Not on file    Highest education level: Not on file   Occupational History    Not on file   Social Needs    Financial resource strain: Not on file    Food insecurity       Worry: Never true       Inability: Never true    Transportation needs       Medical: No       Non-medical: No   Tobacco Use    Smoking status: Never Smoker    Smokeless tobacco: Never Used   Substance and Sexual Activity    Alcohol use: Not Currently    Drug use: Not Currently       Types: Marijuana, Cocaine    Sexual activity: Not Currently       Partners: Male   Lifestyle    Physical activity       Days per week: Not on file       Minutes per session: Not on file    Stress: Not on file   Relationships    Social connections       Talks on phone: Not on file       Gets together: Not on file       Attends Latter day service: Not on file       Active member of club or organization: Not on file       Attends meetings of clubs or organizations: Not on file       Relationship status: Not on file    Intimate partner violence       Fear of current or ex partner: Not on file       Emotionally abused: Not on file       Physically abused: Not on file       Forced sexual activity: Not on file   Other Topics Concern    Not on file   Social History Narrative    Not on file            Family History[]Expand by Default         Family History   Problem Relation Age of Onset    Diabetes Mother      Stroke Mother      Diabetes Father      Diabetes Sister      Diabetes Brother      Other Son           GSW    No Known Problems Sister              Allergies:  Bactrim [sulfamethoxazole-trimethoprim] and Adhesive tape     Home Medications:  Home Medications[]Expand by Default           Prior to Admission medications    Medication Sig Start Date End Date Taking? Authorizing Provider   venlafaxine (EFFEXOR XR) 150 MG extended release capsule Take 1 capsule by mouth daily (with breakfast) 2/10/21     Joseph Carolina MD   insulin glargine (LANTUS SOLOSTAR) 100 UNIT/ML injection pen Inject 15 Units into the skin nightly 2/9/21     Joseph Carolina MD   Misc.  Devices MISC 1 pair of dm shoes, 3 accommodated inserts 1/22/21     Renzo Elizabeth DPM   dicyclomine (BENTYL) 20 MG tablet Take 1 tablet by mouth 3 times daily as needed (for abdominal discomfort) 1/13/21     Enrique Randall MD   cetirizine (ZYRTEC) 10 MG tablet Take 1 tablet by mouth daily 1/13/21     Puma Green MD   Insulin Pen Needle ([employer] PEN NEEDLES 31G) 31G X 8 MM MISC 1 each by Does not apply route daily 1/4/21     Enrique Randall MD   ARIPiprazole (ABILIFY) 10 MG tablet Take 1 tablet by mouth daily 12/11/20     Enrique Randall MD   gabapentin (NEURONTIN) 300 MG capsule TAKE 3 CAPSULES 900 MG BY MOUTH THREE TIMES DAILY 12/9/20 1/8/21   Enrique Randall MD    MG capsule TAKE 1 CAPSULE BY MOUTH TWICE DAILY 12/9/20     Enrique Randall MD   acetaminophen (TYLENOL) 325 MG tablet TAKE 2 TABLETS BY MOUTH EVERY 6 HOURS AS NEEDED FOR PAIN 12/9/20     Enrique Randall MD   metFORMIN (GLUCOPHAGE) 1000 MG tablet TAKE 1 TABLET BY MOUTH DAILY WITH BREAKFAST 12/9/20     Enrique Randall MD   traZODone (DESYREL) 150 MG tablet Take 1 tablet by mouth nightly 11/18/20     Enrique Randall MD   omeprazole (PRILOSEC) 20 MG delayed release capsule Take 1 capsule by mouth Daily 11/18/20     Enrique Randall MD   FREESTYLE LITE strip 1 each by Does not apply route 3 times daily 11/11/20     Enrique Randall MD   FreeStyle Lancets MISC 1 each by Does not apply route 3 times daily 11/11/20     Enrique Randall MD   Alcohol Swabs (ALCOHOL PREP) 70 % PADS 1 each by Does not apply route as needed (use as needed) Use_____times per day  Diagnosis: 250.0   Diabetes ___Insulin-Dependent___Non-Insulin Dependent 11/11/20     Enrique Randall MD   FLUoxetine (PROZAC) 10 MG capsule Take 60 mg by mouth daily       Historical Provider, MD   benzonatate (TESSALON) 200 MG capsule   8/17/20     Historical Provider, MD   celecoxib (CELEBREX) 200 MG capsule   10/10/20     Historical Provider, MD   albuterol sulfate  (90 Base) MCG/ACT inhaler Inhale 2 puffs into the lungs every 4 hours as needed for Wheezing 10/20/20 11/20/20   Luz Kendrick MD   FLOVENT  MCG/ACT inhaler Inhale 2 puffs into the lungs 2 times daily 10/20/20     Luz Kendrick MD   SITagliptin (JANUVIA) 100 MG tablet Take 1 tablet by mouth daily 10/20/20     Luz Kendrick MD   budesonide-formoterol (SYMBICORT) 80-4.5 MCG/ACT AERO Inhale 2 puffs into the lungs 2 times daily 10/20/20     Luz Kendrick MD   melatonin (RA MELATONIN) 3 MG TABS tablet Take 1 tablet by mouth daily 10/20/20     Luz Kendrick MD        EXAM:  Patient is alert and oriented, NAD, c/o pain in left ring finger. HEENT: NC/AT, PERRL, EOMI, mucous membranes pink and moist.  Chest: breathing nonlabored. Abd: benign. Ex: left hand shows peeling of callus from distal ring finger. Swelling is down from last night's photo. Finger is soft, not tense. No fluctuance. Quite tender from PIP distal. Minimal tenderness proximal, including flexor sheath. No lymphangitis noted. No current drainage, nothing to express. Imp:    Left ring finger felon with cellulitis, drained. Plan:    Antibiotics and further plan per Infectious Disease specialist.  Nothing surgical needed.

## 2021-02-24 NOTE — CARE COORDINATION
Patient is from [facility] . Plans to return. Unable to reach admissions after hours. Spoke to nurses station who confirms patient was in their rehab unit. . Admissions # [phone] - May need new precert to return.

## 2021-02-24 NOTE — PROGRESS NOTES
[address]  CDU / OBSERVATION ENCOUNTER  ATTENDING NOTE       I performed a history and physical examination of the patient and discussed management with the resident or midlevel provider. I reviewed the resident or midlevel provider's note and agree with the documented findings and plan of care. Any areas of disagreement are noted on the chart. I was personally present for the key portions of any procedures. I have documented in the chart those procedures where I was not present during the key portions. I have reviewed the nurses notes. I agree with the chief complaint, past medical history, past surgical history, allergies, medications, social and family history as documented unless otherwise noted below. The Family history, social history, and ROS are effectively unchanged since admission unless noted elsewhere in the chart. Patient admitted for treatment of finger infection. Patient has hand and ID consulted. Patient on vancomycin. Patient had attempts at drainage in ED with some purulent material being expressed but with ongoing symptoms and ongoing infection patient admitted for IV antibiotics and evaluation for signs of improving infection. See picture in chart. Fairly extensive involvement. Appreciate consultant input. Patient uncomfortable but symptoms controlled with analgesics. Antibiotics continue. Concern for possible osteomyelitis.     Tony Cook MD  Attending Emergency  Physician

## 2021-02-24 NOTE — H&P
[address] [facility]  CDU / OBSERVATION eNCOUnter  Resident Note     Pt Name: Enrique Scheuermann  MRN: [id number]  [place] 1965  Date of evaluation: 2/24/21  Patient's PCP is :  MD Marah Luque       Chief Complaint   Patient presents with    Hand Pain     left, possible infection     Elbow Pain         HISTORY OF PRESENT ILLNESS    Enrique Scheuermann is a 50 y.o. female who originally presented to the emergency department with chief complaint of left-sided second finger pain. Patient reported that the finger pain began after a fall several days ago. No prodromal symptoms suspicious for syncopal or cardiac related cause of the fall, patient states she slipped. Patient was sent in by her nursing home for evaluation as the left second digit had become swollen, discolored, and they were concerned for infection. Patient found to have felon in the emergency department, underwent bedside incision and drainage per chart review they were unable to express a significant amount of pus. Plastic surgery was consulted, patient started on vancomycin and Rocephin. Patient does have a history of diabetes mellitus and peripheral neuropathy. Per emergency department note there was concern for possible felon in the patient's left great toe. Patient denied pain in this area however she does have a history of neuropathy and has chronic sensory loss in this area.     Location/Symptom: Left finger pain  Timing/Onset: Several days ago  Provocation: Fall  Quality: Dull  Radiation: Into left forearm  Severity: 7/10  Timing/Duration: Constant  Modifying Factors: None    REVIEW OF SYSTEMS       General ROS - No fevers, No malaise   Ophthalmic ROS - No discharge, No changes in vision  ENT ROS -  No sore throat, No rhinorrhea,   Respiratory ROS - no shortness of breath, no cough, no  wheezing  Cardiovascular ROS - No chest pain, no dyspnea on exertion  Gastrointestinal ROS - No abdominal pain, no nausea or vomiting, no change in bowel habits, no black or bloody stools  Genito-Urinary ROS - No dysuria, trouble voiding, or hematuria  Musculoskeletal ROS - No myalgias, No arthalgias, positive for left second finger pain and infection  Neurological ROS - No headache, no dizziness/lightheadedness, No focal weakness, no loss of sensation  Dermatological ROS - No lesions, No rash     (PQRS) Advance directives on face sheet per hospital policy. No change unless specifically mentioned in chart    PAST MEDICAL HISTORY    has a past medical history of Acid reflux, Acute cystitis with hematuria, Acute non-recurrent maxillary sinusitis, Asthma, Bipolar 1 disorder ([address] Bipolar disorder, mixed ([address] BMI 34.0-34.9,adult, Cannabis use disorder, severe, dependence ([address] Cerebrovascular accident (CVA) ([address] Chest pain, Chronic renal insufficiency, Cocaine abuse ([address] DDD (degenerative disc disease), cervical, Diabetes mellitus ([address] Dizziness, Fibromyalgia, History of stroke, Homicidal ideation, Hyperglycemia, Hypertension, Hypotension, IDDM (insulin dependent diabetes mellitus), Lupus ([address] Migraine, Neuropathy, Neuropathy, Polysubstance abuse ([address] Post traumatic stress disorder (PTSD), Posttraumatic stress disorder, Recurrent depression ([address] Recurrent major depressive disorder, in partial remission ([address] Screening mammogram, encounter for, Severe recurrent major depression with psychotic features ([address] Severe recurrent major depression without psychotic features ([address] Stroke (cerebrum) ([address] Stroke ([address] Suicidal ideation, Suicidal intent, Vitamin D deficiency, and White matter changes. I have reviewed the past medical history with the patient and it is pertinent to this complaint. SURGICAL HISTORY      has a past surgical history that includes Abscess Drainage; chest tube insertion; Abdomen surgery; Cataract removal with implant (Bilateral); and LASIK (Bilateral).   I have reviewed and agree with Surgical History entered and it is pertinent to this complaint. CURRENT MEDICATIONS         morphine injection 4 mg, Q4H PRN      cefTRIAXone (ROCEPHIN) 1000 mg IVPB in 50 mL D5W minibag, Q24H      insulin lispro (HUMALOG) injection vial 0-12 Units, TID WC      insulin lispro (HUMALOG) injection vial 0-6 Units, Nightly      glucose (GLUTOSE) 40 % oral gel 15 g, PRN      dextrose 50 % IV solution, PRN      glucagon (rDNA) injection 1 mg, PRN      dextrose 5 % solution, PRN      oxyCODONE-acetaminophen (PERCOCET) 5-325 MG per tablet 2 tablet, Q6H PRN      ARIPiprazole (ABILIFY) tablet 10 mg, Daily      cetirizine (ZYRTEC) tablet 10 mg, Daily      insulin glargine (LANTUS) injection vial 15 Units, Nightly      melatonin tablet 3 mg, Daily      metFORMIN (GLUCOPHAGE) tablet 1,000 mg, Daily with breakfast      pantoprazole (PROTONIX) tablet 40 mg, QAM AC      traZODone (DESYREL) tablet 150 mg, Nightly      venlafaxine (EFFEXOR XR) extended release capsule 150 mg, Daily with breakfast      alogliptin (NESINA) tablet 25 mg, Daily      sodium chloride flush 0.9 % injection 10 mL, 2 times per day      sodium chloride flush 0.9 % injection 10 mL, PRN      enoxaparin (LOVENOX) injection 40 mg, Daily      vancomycin (VANCOCIN) intermittent dosing (placeholder), RX Placeholder      vancomycin (VANCOCIN) 1250 mg in dextrose 5 % 250 mL IVPB, Q12H        All medication charted and reviewed. ALLERGIES     is allergic to bactrim [sulfamethoxazole-trimethoprim] and adhesive tape. FAMILY HISTORY     She indicated that her mother is . She indicated that her father is . She indicated that only one of her two sisters is alive. She indicated that her brother is alive. She indicated that her son is . family history includes Diabetes in her brother, father, mother, and sister; No Known Problems in her sister; Other in her son; Stroke in her mother.   The patient denies any pertinent family history. I have reviewed and agree with the family history entered. I have reviewed the Family History and it is not significant to the case    SOCIAL HISTORY      reports that she has never smoked. She has never used smokeless tobacco. She reports previous alcohol use. She reports previous drug use. Drugs: Marijuana and Cocaine. I have reviewed and agree with all Social.  There are no concerns for substance abuse/use. PHYSICAL EXAM     INITIAL VITALS:  height is 5' 6\" (1.676 m) and weight is 250 lb (113.4 kg). Her oral temperature is 98.5 °F (36.9 °C). Her blood pressure is 136/82 and her pulse is 85. Her respiration is 16 and oxygen saturation is 94%. CONSTITUTIONAL: AOx4, no apparent distress, appears stated age    HEAD: normocephalic, atraumatic   EYES: PERRLA, EOMI    ENT: moist mucous membranes, uvula midline   NECK: supple, symmetric   BACK: symmetric   LUNGS: clear to auscultation bilaterally   CARDIOVASCULAR: regular rate and rhythm, no murmurs, rubs or gallops, bilateral radial pulses equal and intact, bilateral DP pulses equal and intact   ABDOMEN: soft, non-tender, non-distended with normal active bowel sounds   NEUROLOGIC:  MAEx4, no focal sensory or motor deficits   MUSCULOSKELETAL: no clubbing, cyanosis or edema   SKIN:  Left second finger infection pictures in chart. No lacerations or ulcers noted on the feet, there is an area of yellow discoloration distal to the left great toe concerning for possible infection       DIFFERENTIAL DIAGNOSIS/MDM:     Left second finger felon, cellulitis, possible left great toe felon versus cellulitis    DIAGNOSTIC RESULTS   RADIOLOGY:   I directly visualized the following  images and reviewed the radiologist interpretations:    Xr Elbow Left (min 3 Views)    Result Date: 2/23/2021  EXAMINATION: THREE XRAY VIEWS OF THE LEFT ELBOW 2/23/2021 4:49 pm COMPARISON: None.  HISTORY: ORDERING SYSTEM PROVIDED HISTORY: Fall TECHNOLOGIST PROVIDED HISTORY: Fall Acuity: Acute Type of Exam: Initial 57-year-old female with acute left elbow pain after a fall FINDINGS: Osseous alignment is normal.  Joint spaces are well maintained. No acute fracture or gross dislocation is seen. The radial head and radial neck appear intact. There is no significant elevation of the posterior fat pad or sail sign to suggest a joint effusion. No acute fracture or dislocation. Xr Hand Left (min 3 Views)    Result Date: 2/23/2021  EXAMINATION: THREE XRAY VIEWS OF THE LEFT HAND 2/23/2021 1:49 pm COMPARISON: None. HISTORY: ORDERING SYSTEM PROVIDED HISTORY: Possible felon after a fall TECHNOLOGIST PROVIDED HISTORY: Possible felon after a fall Reason for Exam: pain to pinky and ringer fingers Acuity: Acute Type of Exam: Initial FINDINGS: The visualized bones are normal. There is no evidence of fracture or dislocation. Periarticular calcification along the DIP joint of the 2nd digit. The remaining joint spaces appear well maintained. The soft tissues are unremarkable. Vascular calcifications are seen compatible with atherosclerotic disease. No acute bony abnormalities are noted       LABS:  I have reviewed and interpreted all available lab results.   Labs Reviewed   CBC WITH AUTO DIFFERENTIAL - Abnormal; Notable for the following components:       Result Value    RBC 3.00 (*)     Hemoglobin 9.5 (*)     Hematocrit 31.8 (*)     .0 (*)     Platelets 235 (*)     NRBC Automated 0.5 (*)     Seg Neutrophils 81 (*)     Lymphocytes 13 (*)     Eosinophils % 0 (*)     Immature Granulocytes 2 (*)     Absolute Lymph # 0.96 (*)     All other components within normal limits   C-REACTIVE PROTEIN - Abnormal; Notable for the following components:    CRP 12.9 (*)     All other components within normal limits   SEDIMENTATION RATE - Abnormal; Notable for the following components:    Sed Rate 100 (*)     All other components within normal limits   BASIC METABOLIC PANEL - Abnormal; Notable for the following components:    Glucose 403 (*)     BUN 21 (*)     Chloride 97 (*)     All other components within normal limits   POC GLUCOSE FINGERSTICK - Abnormal; Notable for the following components:    POC Glucose 356 (*)     All other components within normal limits   POC GLUCOSE FINGERSTICK - Abnormal; Notable for the following components:    POC Glucose 221 (*)     All other components within normal limits   COVID-19, RAPID   SPECIMEN REJECTION   PREVIOUS SPECIMEN   POCT GLUCOSE   POCT GLUCOSE   POCT GLUCOSE   POCT GLUCOSE   POCT GLUCOSE   POCT GLUCOSE   POCT GLUCOSE         CDU IMPRESSION / Janet Shaw is a 50 y.o. female who presents with with several days of left second finger pain and found to have felon which was drained bedside emergency department. Patient started on vancomycin and Rocephin. Infectious disease consulted for felon. Plastic surgery consulted as well. Patient reports improvement in symptoms overnight. Given concern for possible infection of left great toe podiatry consulted as well. · Continue IV antibiotics, appreciate infectious disease input on selection  · Recommendations per plastics  · Commendations per podiatry  · Continue home medications and pain control  · Monitor vitals, labs, and imaging  · DISPO: pending consults and clinical improvement    CONSULTS:    IP CONSULT TO PLASTIC SURGERY  PHARMACY TO DOSE VANCOMYCIN  IP CONSULT TO INFECTIOUS DISEASES  IP CONSULT TO PODIATRY    PROCEDURES:  Not indicated       PATIENT REFERRED TO:    No follow-up provider specified. --  Daija Abraham DO   Emergency Medicine Resident     This dictation was generated by voice recognition computer software. Although all attempts are made to edit the dictation for accuracy, there may be errors in the transcription that are not intended.

## 2021-02-24 NOTE — ED PROVIDER NOTES
[facility]  Emergency Department Encounter  Emergency Medicine Resident     Pt Name: Yong Everett  MRN: [id number]  [place] 1965  Date ofevaluation: 2/24/21  PCP:  Susan Penny MD    [address] [place] [address]       Chief Complaint   Patient presents with    Hand Pain     left, possible infection     Elbow Pain       HISTORY OF PRESENT ILLNESS  (Location/Symptom, Timing/Onset, Context/Setting, Quality, Duration, Modifying Factors, Severity, Associated signs/symptoms)     Yong Everett is a 50 y.o. female who presents with left finger pain. Patient reports that she fell several days ago onto her hand and has since developed pain in her left ring finger. She did not hit her head or lose consciousness and denies any preceding symptoms such as chest pain, shortness of breath or dizziness. She was sent in by her nursing home as the ring finger in her left hand has become severely painful and appears infected. She currently rates her pain 10/10 in this area. Also reports of pain in her left elbow. No fevers, chills, chest pain or shortness of breath. Does have history of lupus as well as diabetes but denies not take steroids chronically.     PAST MEDICAL / SURGICAL / SOCIAL / FAMILY HISTORY      has a past medical history of Acid reflux, Acute cystitis with hematuria, Acute non-recurrent maxillary sinusitis, Asthma, Bipolar 1 disorder ([address] Bipolar disorder, mixed ([address] BMI 34.0-34.9,adult, Cannabis use disorder, severe, dependence ([address] Cerebrovascular accident (CVA) ([address] Chest pain, Chronic renal insufficiency, Cocaine abuse ([address] DDD (degenerative disc disease), cervical, Diabetes mellitus ([address] Dizziness, Fibromyalgia, History of stroke, Homicidal ideation, Hyperglycemia, Hypertension, Hypotension, IDDM (insulin dependent diabetes mellitus), Lupus ([address] Migraine, Neuropathy, Neuropathy, Polysubstance abuse ([address] Post traumatic stress disorder (PTSD), Posttraumatic stress disorder, file       Family History   Problem Relation Age of Onset    Diabetes Mother     Stroke Mother     Diabetes Father     Diabetes Sister     Diabetes Brother     Other Son         GSW    No Known Problems Sister        Allergies:  Bactrim [sulfamethoxazole-trimethoprim] and Adhesive tape    Home Medications:  Prior to Admission medications    Medication Sig Start Date End Date Taking? Authorizing Provider   venlafaxine (EFFEXOR XR) 150 MG extended release capsule Take 1 capsule by mouth daily (with breakfast) 2/10/21   Mai Timmons MD   insulin glargine (LANTUS SOLOSTAR) 100 UNIT/ML injection pen Inject 15 Units into the skin nightly 2/9/21   Mai Timmons MD   Misc.  Devices MISC 1 pair of dm shoes, 3 accommodated inserts 1/22/21   Kirk Oneill DPM   dicyclomine (BENTYL) 20 MG tablet Take 1 tablet by mouth 3 times daily as needed (for abdominal discomfort) 1/13/21   Juve De La Vega MD   cetirizine (ZYRTEC) 10 MG tablet Take 1 tablet by mouth daily 1/13/21   Juve De La Vega MD   Insulin Pen Needle ([employer] PEN NEEDLES 31G) 31G X 8 MM MISC 1 each by Does not apply route daily 1/4/21   Juve De La Vega MD   ARIPiprazole (ABILIFY) 10 MG tablet Take 1 tablet by mouth daily 12/11/20   Juve De La Vega MD   gabapentin (NEURONTIN) 300 MG capsule TAKE 3 CAPSULES 900 MG BY MOUTH THREE TIMES DAILY 12/9/20 1/8/21  Juve De La Vega MD    MG capsule TAKE 1 CAPSULE BY MOUTH TWICE DAILY 12/9/20   Juve De La Vega MD   acetaminophen (TYLENOL) 325 MG tablet TAKE 2 TABLETS BY MOUTH EVERY 6 HOURS AS NEEDED FOR PAIN 12/9/20   Juve De La Vega MD   metFORMIN (GLUCOPHAGE) 1000 MG tablet TAKE 1 TABLET BY MOUTH DAILY WITH BREAKFAST 12/9/20   Juve De La Vega MD   traZODone (DESYREL) 150 MG tablet Take 1 tablet by mouth nightly 11/18/20   Juve De La Vega MD   omeprazole (PRILOSEC) 20 MG delayed release capsule Take 1 capsule by mouth Daily 11/18/20   Juve De La Vega MD   FREESTYLE LITE strip 1 each by Does not apply route 3 times daily 11/11/20   Daniel Beaulieu MD   FreeStyle Lancets MISC 1 each by Does not apply route 3 times daily 11/11/20   Daniel Beaulieu MD   Alcohol Swabs (ALCOHOL PREP) 70 % PADS 1 each by Does not apply route as needed (use as needed) Use_____times per day  Diagnosis: 250.0   Diabetes ___Insulin-Dependent___Non-Insulin Dependent 11/11/20   Daniel Beaulieu MD   FLUoxetine (PROZAC) 10 MG capsule Take 60 mg by mouth daily    Historical Provider, MD   benzonatate (TESSALON) 200 MG capsule  8/17/20   Historical Provider, MD   celecoxib (CELEBREX) 200 MG capsule  10/10/20   Historical Provider, MD   albuterol sulfate  (90 Base) MCG/ACT inhaler Inhale 2 puffs into the lungs every 4 hours as needed for Wheezing 10/20/20 11/20/20  Dnaiel Beaulieu MD   FLOVENT  MCG/ACT inhaler Inhale 2 puffs into the lungs 2 times daily 10/20/20   Daniel Beaulieu MD   SITagliptin (JANUVIA) 100 MG tablet Take 1 tablet by mouth daily 10/20/20   Daniel Beaulieu MD   budesonide-formoterol (SYMBICORT) 80-4.5 MCG/ACT AERO Inhale 2 puffs into the lungs 2 times daily 10/20/20   Daniel Beaulieu MD   melatonin (RA MELATONIN) 3 MG TABS tablet Take 1 tablet by mouth daily 10/20/20   Daniel Beaulieu MD       REVIEW OF SYSTEMS    (2-9 systems for level 4, 10 or more for level 5)      Review of Systems   Constitutional: Negative for chills and fever. HENT: Negative for rhinorrhea and sore throat. Eyes: Negative for redness and itching. Respiratory: Negative for cough and shortness of breath. Cardiovascular: Negative for chest pain. Gastrointestinal: Negative for abdominal pain, nausea and vomiting. Genitourinary: Negative for difficulty urinating and dysuria. Musculoskeletal: Negative for arthralgias and myalgias. Skin:        Positive for infected finger   Allergic/Immunologic: Negative for environmental allergies and food allergies.    Neurological: Negative for weakness, numbness and headaches. PHYSICAL EXAM   (up to 7 for level 4, 8 or more for level 5)      INITIAL VITALS:   BP (!) 140/90   Pulse 92   Temp 97.3 °F (36.3 °C) (Oral)   Resp 16   Ht 5' 6\" (1.676 m)   Wt 250 lb (113.4 kg)   LMP  (LMP Unknown)   SpO2 96%   BMI 40.35 kg/m²     Physical Exam  Vitals signs and nursing note reviewed. Constitutional:       General: She is not in acute distress. Appearance: Normal appearance. She is not ill-appearing, toxic-appearing or diaphoretic. HENT:      Head: Normocephalic and atraumatic. Eyes:      General: No scleral icterus. Conjunctiva/sclera: Conjunctivae normal.   Neck:      Musculoskeletal: Neck supple. Cardiovascular:      Rate and Rhythm: Normal rate and regular rhythm. Pulmonary:      Effort: Pulmonary effort is normal. No respiratory distress. Breath sounds: Normal breath sounds. No stridor. No wheezing, rhonchi or rales. Abdominal:      General: There is no distension. Palpations: Abdomen is soft. There is no mass. Tenderness: There is no abdominal tenderness. There is no guarding or rebound. Musculoskeletal:      Right lower leg: No edema. Left lower leg: No edema. Skin:     General: Skin is warm and dry. Coloration: Skin is not jaundiced. Comments: Severe felon noted over the left ring finger. Please see picture below. Neurological:      General: No focal deficit present. Mental Status: She is alert and oriented to person, place, and time.    Psychiatric:         Mood and Affect: Mood normal.         Behavior: Behavior normal.     Media Information           DIAGNOSTICS     PLAN (LABS / IMAGING / EKG):  Orders Placed This Encounter   Procedures    COVID-19, Rapid    XR HAND LEFT (MIN 3 VIEWS)    XR ELBOW LEFT (MIN 3 VIEWS)    CBC WITH AUTO DIFFERENTIAL    C-REACTIVE PROTEIN    Sedimentation Rate    SPECIMEN REJECTION    PREVIOUS SPECIMEN    Basic Metabolic Panel    Diet NPO Effective Now    Vital signs per unit routine    Notify physician    Notify physician    Up as tolerated    Place intermittent pneumatic compression device    Full Code    Inpatient consult to Plastic Surgery    Pharmacy to Dose: Vancomycin    Inpatient consult to Infectious Diseases    POC Glucose Fingerstick    PATIENT STATUS (FROM ED OR OR/PROCEDURAL) Observation       MEDICATIONS ORDERED:  Orders Placed This Encounter   Medications    morphine injection 4 mg    lidocaine 1 % injection 20 mL    oxyCODONE (ROXICODONE) immediate release tablet 5 mg    fentaNYL (SUBLIMAZE) injection 100 mcg    cefTRIAXone (ROCEPHIN) 1000 mg IVPB in 50 mL D5W minibag     Order Specific Question:   Antimicrobial Indications     Answer:   Skin and Soft Tissue Infection    ARIPiprazole (ABILIFY) tablet 10 mg    cetirizine (ZYRTEC) tablet 10 mg    insulin glargine (LANTUS) injection vial 15 Units    melatonin tablet 3 mg    metFORMIN (GLUCOPHAGE) tablet 1,000 mg    pantoprazole (PROTONIX) tablet 40 mg    traZODone (DESYREL) tablet 150 mg    venlafaxine (EFFEXOR XR) extended release capsule 150 mg    alogliptin (NESINA) tablet 25 mg     Order Specific Question:   Please select a reason the therapeutic interchange was not accepted:      Answer:   Deanna Reilly for Pharmacy to Substitute    sodium chloride flush 0.9 % injection 10 mL    sodium chloride flush 0.9 % injection 10 mL    enoxaparin (LOVENOX) injection 40 mg    acetaminophen (TYLENOL) tablet 650 mg    vancomycin (VANCOCIN) intermittent dosing (placeholder)     Order Specific Question:   Antimicrobial Indications     Answer:   Skin and Soft Tissue Infection    FOLLOWED BY Linked Order Group     vancomycin (VANCOCIN) 2,000 mg in dextrose 5 % 500 mL IVPB      Order Specific Question:   Antimicrobial Indications      Answer:   Skin and Soft Tissue Infection     vancomycin (VANCOCIN) 1250 mg in dextrose 5 % 250 mL IVPB      Order Specific Question:   Antimicrobial Indications Answer:   Skin and Soft Tissue Infection    morphine injection 4 mg    oxyCODONE (ROXICODONE) immediate release tablet 5 mg    morphine injection 4 mg    cefTRIAXone (ROCEPHIN) 1000 mg IVPB in 50 mL D5W minibag     Order Specific Question:   Antimicrobial Indications     Answer:   Skin and Soft Tissue Infection       DIAGNOSTIC RESULTS / EMERGENCYDEPARTMENT COURSE / MDM     LABS:  Results for orders placed or performed during the hospital encounter of 02/23/21   COVID-19, Rapid    Specimen: Nasopharyngeal Swab   Result Value Ref Range    Specimen Description . NASOPHARYNGEAL SWAB     SARS-CoV-2, Rapid Not Detected Not Detected   CBC WITH AUTO DIFFERENTIAL   Result Value Ref Range    WBC 7.3 3.5 - 11.3 k/uL    RBC 3.00 (L) 3.95 - 5.11 m/uL    Hemoglobin 9.5 (L) 11.9 - 15.1 g/dL    Hematocrit 31.8 (L) 36.3 - 47.1 %    .0 (H) 82.6 - 102.9 fL    MCH 31.7 25.2 - 33.5 pg    MCHC 29.9 28.4 - 34.8 g/dL    RDW 13.3 11.8 - 14.4 %    Platelets 452 (H) 562 - 453 k/uL    MPV 10.7 8.1 - 13.5 fL    NRBC Automated 0.5 (H) 0.0 per 100 WBC    Differential Type NOT REPORTED     Seg Neutrophils 81 (H) 36 - 65 %    Lymphocytes 13 (L) 24 - 43 %    Monocytes 4 3 - 12 %    Eosinophils % 0 (L) 1 - 4 %    Basophils 0 0 - 2 %    Immature Granulocytes 2 (H) 0 %    Segs Absolute 5.88 1.50 - 8.10 k/uL    Absolute Lymph # 0.96 (L) 1.10 - 3.70 k/uL    Absolute Mono # 0.29 0.10 - 1.20 k/uL    Absolute Eos # <0.03 0.00 - 0.44 k/uL    Basophils Absolute 0.03 0.00 - 0.20 k/uL    Absolute Immature Granulocyte 0.12 0.00 - 0.30 k/uL    WBC Morphology NOT REPORTED     RBC Morphology MACROCYTOSIS PRESENT     Platelet Estimate NOT REPORTED    C-REACTIVE PROTEIN   Result Value Ref Range    CRP 12.9 (H) 0.0 - 5.0 mg/L   Sedimentation Rate   Result Value Ref Range    Sed Rate 100 (H) 0 - 30 mm   SPECIMEN REJECTION   Result Value Ref Range    Specimen Source . BLOOD     Ordered Test BMP     Reason for Rejection Unable to perform testing: Specimen hemolyzed. - NOT REPORTED    Basic Metabolic Panel   Result Value Ref Range    Glucose 403 (HH) 70 - 99 mg/dL    BUN 21 (H) 6 - 20 mg/dL    CREATININE 0.87 0.50 - 0.90 mg/dL    Bun/Cre Ratio NOT REPORTED 9 - 20    Calcium 8.7 8.6 - 10.4 mg/dL    Sodium 136 135 - 144 mmol/L    Potassium 5.0 3.7 - 5.3 mmol/L    Chloride 97 (L) 98 - 107 mmol/L    CO2 28 20 - 31 mmol/L    Anion Gap 11 9 - 17 mmol/L    GFR Non-African American >60 >60 mL/min    GFR African American >60 >60 mL/min    GFR Comment          GFR Staging NOT REPORTED    POC Glucose Fingerstick   Result Value Ref Range    POC Glucose 356 (H) 65 - 105 mg/dL       RADIOLOGY:  XR HAND LEFT (MIN 3 VIEWS)   Final Result   No acute bony abnormalities are noted         XR ELBOW LEFT (MIN 3 VIEWS)   Final Result   No acute fracture or dislocation. EMERGENCY DEPARTMENT COURSE:    ED Course as of Feb 24 0057 Tue Feb 23, 2021   1847 Discussed with Dr. Luis Watkins from hand surgery. Recommends using a hemostat to break up the loculations and consulting ID. [TC]      ED Course User Index  [TC] Demarco Gallegos DO       MDM: 77-year-old female presenting with a felon over her left ring finger. Please see picture above for details. On exam she is nontoxic-appearing no acute distress. Vitals unremarkable. Heart regular rate and rhythm, lungs are clear to auscultation bilateral.  Abdomen soft nontender. Does have severe purulent appearing finger on her left ring finger. Sensation intact in this finger. Capillary refill is intact. Is able to flex and extend her finger without significant pain to suggest flexor tenosynovitis. Impression is felon. Will attempt bedside drainage and obtain basic labs as well as x-ray of her left hand and elbow. X-rays unremarkable. Blood work shows some elevation in her inflammatory markers.   Did discuss with plastic surgery given the severity of her found he recommends draining the finger, and opening with a hemostat as well as placing a drain. This was done, however did not have significant purulent material expressed. Given the severity of her infection, will admit to the observation unit for IV antibiotics, ID consultation, as well as podiatry consultation as she appears to have some similar infectious etiology of her toes. PROCEDURES:  PROCEDURE NOTE - DIGITAL NERVE BLOCK    PATIENT NAME: Amos Zhao  MEDICAL RECORD NO. [id number]  DATE: 2/24/2021  ATTENDING PHYSICIAN: Dr. Soco White DIAGNOSIS:  Digit Pain  POSTOPERATIVE DIAGNOSIS:  Same  PROCEDURE PERFORMED:  Location: left ring finger nerve block   PERFORMING PHYSICIAN: Kenia Mckeon DO      DISCUSSION:  Amos Zhao is a 50y.o.-year-old female who requires a digital nerve block for pain relief. The history and physical examination were reviewed and confirmed. CONSENT: The patient provided verbal consent for this procedure. PROCEDURE:The patient was placed with the dorsum of the hand up. 4 cc of 1% Lidocaine without epinephrine was injected in the web space on each side of the digit. The patient tolerated the procedure well. COMPLICATIONS:None    Procedure note-felon drainage:  Attending physician: Dr. Dee Olvera    Description of procedure: The left ring finger was sterilely prepped and draped in the usual sterile fashion. An 11 blade scalpel was used to make a linear incision over the lateral aspect of the left ring finger on both the ulnar and radial side. A forceps was used to attempted to break up loculations without significant expression of purulent fluid. IV tubing was then used and strong through incision in her ulnar aspect of the finger through the pad of her finger to assist with drainage. Patient tolerated the procedure well. No immediate complications were obvious.        Kenia Mckeon DO  12:59 AM, 2/24/21      CONSULTS:  IP CONSULT TO PLASTIC SURGERY  PHARMACY TO DOSE VANCOMYCIN  IP CONSULT TO INFECTIOUS

## 2021-02-24 NOTE — PROGRESS NOTES
Pharmacy Note  Vancomycin Consult    Erika Collado is a 50 y.o. female started on Vancomycin for infection of finger; consult received from Dr. Segun Kirby to manage therapy. Also receiving the following antibiotics: ceftriaxone.     Patient Active Problem List   Diagnosis    IDDM (insulin dependent diabetes mellitus)    Lupus ([facility])    Bipolar disorder, mixed ([facility])    Post traumatic stress disorder (PTSD)    Acute cystitis with hematuria    Hyperglycemia    Suicidal ideation    Arthritis    Chronic back pain    Acid reflux    History of stroke    Polysubstance abuse ([address]    Bipolar 1 disorder ([facility])    Cocaine abuse ([address]    Chest pain    Acute non-recurrent maxillary sinusitis    Acute respiratory failure with hypercapnia ([facility])    Alcohol use disorder, severe, dependence ([address]    Asthma exacerbation attacks    Bilateral edema of lower extremity    Bipolar 1 disorder, depressed, severe ([facility])    BMI 34.0-34.9,adult    Cannabis use disorder, severe, dependence ([address]    Cocaine use disorder, severe, dependence ([address]    Dizziness    Dyslipidemia    Family history of colon cancer    History of lupus    Homicidal ideation    Hypotension    Neuropathy    Normocytic anemia    Recurrent depression ([facility])    Recurrent major depressive disorder, in partial remission ([facility])    Severe recurrent major depression with psychotic features ([facility])    Severe recurrent major depression without psychotic features ([address]    Upper GI bleed    Vitamin D deficiency    White matter changes    Gastroesophageal reflux disease    Stroke (cerebrum) ([facility])    Rib lesion    Type 2 diabetes mellitus ([facility])    YAEL (generalized anxiety disorder)    Posttraumatic stress disorder    Suicidal intent    Leg weakness, bilateral    Poorly controlled diabetes mellitus ([address]    Acute kidney injury ([address]    Felon of finger     Allergies:  Bactrim [sulfamethoxazole-trimethoprim] and Adhesive tape     Temp max: 98.2    Recent Labs     02/23/21  1651   WBC 7.3

## 2021-02-24 NOTE — CONSULTS
tissue swelling. 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 I have personally reviewed the past medical history, past surgical history, medications, social history, and family history, and I haveupdated the database accordingly. Allergies:   Bactrim [sulfamethoxazole-trimethoprim] and Adhesive tape     Review of Systems:     Review of Systems   Constitutional: Negative for activity change and appetite change. HENT: Negative for congestion and facial swelling. Eyes: Negative for pain and itching. Respiratory: Negative for cough and shortness of breath. Cardiovascular: Negative for chest pain. Gastrointestinal: Negative for abdominal pain. Endocrine: Negative for cold intolerance and heat intolerance. Genitourinary: Negative for dysuria and pelvic pain. Musculoskeletal: Negative for arthralgias and myalgias. Left ring finger abscess   Skin: Positive for color change and wound. Negative for rash. Neurological: Negative for dizziness and light-headedness. Hematological: Negative for adenopathy. Psychiatric/Behavioral: Negative for agitation. Physical Examination :       Physical Exam  Constitutional:       General: She is not in acute distress. Appearance: Normal appearance. She is not ill-appearing. HENT:      Head: Normocephalic and atraumatic. Nose: No congestion. Mouth/Throat:      Mouth: Mucous membranes are moist.   Eyes:      General: No scleral icterus. Extraocular Movements: Extraocular movements intact. Conjunctiva/sclera: Conjunctivae normal.   Neck:      Musculoskeletal: Normal range of motion. No muscular tenderness. Cardiovascular:      Rate and Rhythm: Normal rate. Heart sounds: No murmur. Pulmonary:      Effort: No respiratory distress. Breath sounds: No stridor. Abdominal:      General: There is no distension. Hernia: No hernia is present.    Genitourinary:     Comments: Urine fatuma  Musculoskeletal:         General: No swelling, tenderness or deformity. Comments: Left ring finger abscess   Skin:     Coloration: Skin is not jaundiced or pale. Neurological:      General: No focal deficit present. Mental Status: She is alert and oriented to person, place, and time. Cranial Nerves: No cranial nerve deficit. Sensory: No sensory deficit.    Psychiatric:         Mood and Affect: Mood normal.         Past Medical History:     Past Medical History:   Diagnosis Date    Acid reflux 5/29/2017    Acute cystitis with hematuria 10/11/2016    Acute non-recurrent maxillary sinusitis 11/28/2017    Asthma     Bipolar 1 disorder ([address] 1/4/2018    Bipolar disorder, mixed ([address]     BMI 34.0-34.9,adult 11/28/2017    Cannabis use disorder, severe, dependence ([address] 12/19/2017    Cerebrovascular accident (CVA) ([address] 6/14/2017    Chest pain 11/5/2016    Chronic renal insufficiency     Cocaine abuse ([address] 1/5/2018    DDD (degenerative disc disease), cervical     Diabetes mellitus ([address]     Dizziness 6/13/2017    Fibromyalgia     History of stroke 9/9/2017    Homicidal ideation 11/6/2017    Hyperglycemia     Hypertension     Hypotension 1/18/2019    IDDM (insulin dependent diabetes mellitus) 12/21/2015    Lupus ([address]     Migraine     Neuropathy     Neuropathy     Polysubstance abuse ([address] 9/17/2017    Post traumatic stress disorder (PTSD)     Posttraumatic stress disorder 5/29/2017    Recurrent depression ([address] 5/29/2017    Recurrent major depressive disorder, in partial remission ([address] 11/28/2017    Screening mammogram, encounter for 11/28/2017    Severe recurrent major depression with psychotic features ([address] 12/19/2017    Severe recurrent major depression without psychotic features ([address] 12/19/2017    Stroke (cerebrum) ([address] 6/14/2017    Stroke ([address]     per chart notes    Suicidal ideation     Suicidal intent 3/10/2017    Vitamin D deficiency 11/28/2017  White matter changes 6/13/2017       Past Surgical  History:     Past Surgical History:   Procedure Laterality Date    ABDOMEN SURGERY      drain tube    ABSCESS DRAINAGE      right buttock    CATARACT REMOVAL WITH IMPLANT Bilateral     CHEST TUBE INSERTION      LASIK Bilateral        Medications:      cefTRIAXone (ROCEPHIN) IV  1,000 mg Intravenous Q24H    insulin lispro  0-12 Units Subcutaneous TID WC    insulin lispro  0-6 Units Subcutaneous Nightly    neomycin-bacitracin-polymyxin   Topical BID    fentanNYL  50 mcg Intravenous Once    ARIPiprazole  10 mg Oral Daily    cetirizine  10 mg Oral Daily    insulin glargine  15 Units Subcutaneous Nightly    melatonin  3 mg Oral Daily    metFORMIN  1,000 mg Oral Daily with breakfast    pantoprazole  40 mg Oral QAM AC    traZODone  150 mg Oral Nightly    venlafaxine  150 mg Oral Daily with breakfast    alogliptin  25 mg Oral Daily    sodium chloride flush  10 mL Intravenous 2 times per day    enoxaparin  40 mg Subcutaneous Daily    vancomycin (VANCOCIN) intermittent dosing (placeholder)   Other RX Placeholder    vancomycin  1,250 mg Intravenous Q12H       Social History:     Social History     Socioeconomic History    Marital status: Legally      Spouse name: Not on file    Number of children: Not on file    Years of education: Not on file    Highest education level: Not on file   Occupational History    Not on file   Social Needs    Financial resource strain: Not on file    Food insecurity     Worry: Never true     Inability: Never true    Transportation needs     Medical: No     Non-medical: No   Tobacco Use    Smoking status: Never Smoker    Smokeless tobacco: Never Used   Substance and Sexual Activity    Alcohol use: Not Currently    Drug use: Not Currently     Types: Marijuana, Cocaine    Sexual activity: Not Currently     Partners: Male   Lifestyle    Physical activity     Days per week: Not on file     Minutes per escape proof reading. It such instances, actual meaningcan be extrapolated by contextual diversion. Shar Alfonso MD  Internal Medicine Resident, PGY1   [address] [place] [address],  [address]  2/24/2021,3:39 PM           I have discussed the care of the patient, including pertinent history and exam findings,  with the resident. I have seen and examined the patient and the key elements of all parts of the encounter have been performed by me. I agree with the assessment, plan and orders as documented by the resident.     Maylin Puga, Infectious Diseases

## 2021-02-24 NOTE — ED NOTES
Report called to receiving RN  Questions/concerns addressed     Alfredo Hansen, VINOD  02/23/21 0560

## 2021-02-25 VITALS
OXYGEN SATURATION: 98 % | WEIGHT: 250 LBS | HEIGHT: 66 IN | RESPIRATION RATE: 12 BRPM | TEMPERATURE: 98.5 F | DIASTOLIC BLOOD PRESSURE: 75 MMHG | BODY MASS INDEX: 40.18 KG/M2 | SYSTOLIC BLOOD PRESSURE: 110 MMHG | HEART RATE: 92 BPM

## 2021-02-25 LAB
ANION GAP SERPL CALCULATED.3IONS-SCNC: 11 MMOL/L (ref 9–17)
BUN BLDV-MCNC: 21 MG/DL (ref 6–20)
BUN/CREAT BLD: ABNORMAL (ref 9–20)
CALCIUM SERPL-MCNC: 8.9 MG/DL (ref 8.6–10.4)
CHLORIDE BLD-SCNC: 98 MMOL/L (ref 98–107)
CO2: 25 MMOL/L (ref 20–31)
CREAT SERPL-MCNC: 0.91 MG/DL (ref 0.5–0.9)
GFR AFRICAN AMERICAN: >60 ML/MIN
GFR NON-AFRICAN AMERICAN: >60 ML/MIN
GFR SERPL CREATININE-BSD FRML MDRD: ABNORMAL ML/MIN/{1.73_M2}
GFR SERPL CREATININE-BSD FRML MDRD: ABNORMAL ML/MIN/{1.73_M2}
GLUCOSE BLD-MCNC: 100 MG/DL (ref 65–105)
GLUCOSE BLD-MCNC: 112 MG/DL (ref 70–99)
GLUCOSE BLD-MCNC: 156 MG/DL (ref 65–105)
GLUCOSE BLD-MCNC: 159 MG/DL (ref 65–105)
POTASSIUM SERPL-SCNC: 4.2 MMOL/L (ref 3.7–5.3)
SODIUM BLD-SCNC: 134 MMOL/L (ref 135–144)

## 2021-02-25 PROCEDURE — 6360000002 HC RX W HCPCS: Performed by: STUDENT IN AN ORGANIZED HEALTH CARE EDUCATION/TRAINING PROGRAM

## 2021-02-25 PROCEDURE — 76937 US GUIDE VASCULAR ACCESS: CPT

## 2021-02-25 PROCEDURE — 2580000003 HC RX 258: Performed by: STUDENT IN AN ORGANIZED HEALTH CARE EDUCATION/TRAINING PROGRAM

## 2021-02-25 PROCEDURE — 6370000000 HC RX 637 (ALT 250 FOR IP): Performed by: EMERGENCY MEDICINE

## 2021-02-25 PROCEDURE — 36415 COLL VENOUS BLD VENIPUNCTURE: CPT

## 2021-02-25 PROCEDURE — 82947 ASSAY GLUCOSE BLOOD QUANT: CPT

## 2021-02-25 PROCEDURE — 6370000000 HC RX 637 (ALT 250 FOR IP): Performed by: STUDENT IN AN ORGANIZED HEALTH CARE EDUCATION/TRAINING PROGRAM

## 2021-02-25 PROCEDURE — 80048 BASIC METABOLIC PNL TOTAL CA: CPT

## 2021-02-25 PROCEDURE — 6370000000 HC RX 637 (ALT 250 FOR IP): Performed by: INTERNAL MEDICINE

## 2021-02-25 PROCEDURE — 99232 SBSQ HOSP IP/OBS MODERATE 35: CPT | Performed by: INTERNAL MEDICINE

## 2021-02-25 PROCEDURE — 96366 THER/PROPH/DIAG IV INF ADDON: CPT

## 2021-02-25 PROCEDURE — 96372 THER/PROPH/DIAG INJ SC/IM: CPT

## 2021-02-25 PROCEDURE — 96376 TX/PRO/DX INJ SAME DRUG ADON: CPT

## 2021-02-25 PROCEDURE — G0378 HOSPITAL OBSERVATION PER HR: HCPCS

## 2021-02-25 RX ORDER — BACITRACIN, NEOMYCIN, POLYMYXIN B 400; 3.5; 5 [USP'U]/G; MG/G; [USP'U]/G
OINTMENT TOPICAL
Qty: 1 TUBE | Refills: 0 | Status: SHIPPED | OUTPATIENT
Start: 2021-02-25 | End: 2021-03-07

## 2021-02-25 RX ORDER — CEPHALEXIN 500 MG/1
500 CAPSULE ORAL EVERY 8 HOURS SCHEDULED
Status: DISCONTINUED | OUTPATIENT
Start: 2021-02-25 | End: 2021-02-25 | Stop reason: HOSPADM

## 2021-02-25 RX ORDER — CEPHALEXIN 500 MG/1
500 CAPSULE ORAL 3 TIMES DAILY
Qty: 30 CAPSULE | Refills: 0 | Status: SHIPPED | OUTPATIENT
Start: 2021-02-25 | End: 2021-03-07

## 2021-02-25 RX ORDER — DOXYCYCLINE HYCLATE 100 MG
100 TABLET ORAL EVERY 12 HOURS SCHEDULED
Qty: 20 TABLET | Refills: 0 | Status: SHIPPED | OUTPATIENT
Start: 2021-02-25 | End: 2021-03-07

## 2021-02-25 RX ORDER — DOXYCYCLINE HYCLATE 100 MG
100 TABLET ORAL EVERY 12 HOURS SCHEDULED
Status: DISCONTINUED | OUTPATIENT
Start: 2021-02-25 | End: 2021-02-25 | Stop reason: HOSPADM

## 2021-02-25 RX ADMIN — INSULIN LISPRO 2 UNITS: 100 INJECTION, SOLUTION INTRAVENOUS; SUBCUTANEOUS at 11:48

## 2021-02-25 RX ADMIN — ALOGLIPTIN 25 MG: 12.5 TABLET, FILM COATED ORAL at 09:28

## 2021-02-25 RX ADMIN — OXYCODONE HYDROCHLORIDE AND ACETAMINOPHEN 2 TABLET: 5; 325 TABLET ORAL at 16:25

## 2021-02-25 RX ADMIN — VENLAFAXINE HYDROCHLORIDE 150 MG: 150 CAPSULE, EXTENDED RELEASE ORAL at 09:28

## 2021-02-25 RX ADMIN — MORPHINE SULFATE 4 MG: 4 INJECTION INTRAVENOUS at 03:47

## 2021-02-25 RX ADMIN — CETIRIZINE HYDROCHLORIDE 10 MG: 10 TABLET ORAL at 09:28

## 2021-02-25 RX ADMIN — CEPHALEXIN 500 MG: 500 CAPSULE ORAL at 13:19

## 2021-02-25 RX ADMIN — MORPHINE SULFATE 4 MG: 4 INJECTION INTRAVENOUS at 14:40

## 2021-02-25 RX ADMIN — ARIPIPRAZOLE 10 MG: 10 TABLET ORAL at 09:28

## 2021-02-25 RX ADMIN — INSULIN LISPRO 2 UNITS: 100 INJECTION, SOLUTION INTRAVENOUS; SUBCUTANEOUS at 16:25

## 2021-02-25 RX ADMIN — POLYMYXIN B SULFATE, BACITRACIN ZINC, NEOMYCIN SULFATE: 5000; 3.5; 4 OINTMENT TOPICAL at 09:28

## 2021-02-25 RX ADMIN — OXYCODONE HYDROCHLORIDE AND ACETAMINOPHEN 2 TABLET: 5; 325 TABLET ORAL at 03:24

## 2021-02-25 RX ADMIN — PANTOPRAZOLE SODIUM 40 MG: 40 TABLET, DELAYED RELEASE ORAL at 07:10

## 2021-02-25 RX ADMIN — MORPHINE SULFATE 4 MG: 4 INJECTION INTRAVENOUS at 07:10

## 2021-02-25 RX ADMIN — OXYCODONE HYDROCHLORIDE AND ACETAMINOPHEN 2 TABLET: 5; 325 TABLET ORAL at 09:35

## 2021-02-25 RX ADMIN — DOXYCYCLINE HYCLATE 100 MG: 100 TABLET, COATED ORAL at 13:19

## 2021-02-25 RX ADMIN — SODIUM CHLORIDE, PRESERVATIVE FREE 10 ML: 5 INJECTION INTRAVENOUS at 10:49

## 2021-02-25 RX ADMIN — Medication 1250 MG: at 10:50

## 2021-02-25 RX ADMIN — MORPHINE SULFATE 4 MG: 4 INJECTION INTRAVENOUS at 11:06

## 2021-02-25 RX ADMIN — ENOXAPARIN SODIUM 40 MG: 40 INJECTION SUBCUTANEOUS at 09:29

## 2021-02-25 RX ADMIN — METFORMIN HYDROCHLORIDE 1000 MG: 500 TABLET ORAL at 09:28

## 2021-02-25 ASSESSMENT — ENCOUNTER SYMPTOMS
ABDOMINAL PAIN: 0
EYE PAIN: 0
COLOR CHANGE: 1
EYE ITCHING: 0
COUGH: 0
SHORTNESS OF BREATH: 0
FACIAL SWELLING: 0

## 2021-02-25 ASSESSMENT — PAIN SCALES - GENERAL
PAINLEVEL_OUTOF10: 8
PAINLEVEL_OUTOF10: 5
PAINLEVEL_OUTOF10: 6
PAINLEVEL_OUTOF10: 6
PAINLEVEL_OUTOF10: 10
PAINLEVEL_OUTOF10: 6
PAINLEVEL_OUTOF10: 7
PAINLEVEL_OUTOF10: 8
PAINLEVEL_OUTOF10: 10

## 2021-02-25 ASSESSMENT — PAIN DESCRIPTION - ORIENTATION: ORIENTATION: LEFT

## 2021-02-25 ASSESSMENT — PAIN DESCRIPTION - PAIN TYPE: TYPE: ACUTE PAIN

## 2021-02-25 ASSESSMENT — PAIN DESCRIPTION - LOCATION: LOCATION: FINGER (COMMENT WHICH ONE)

## 2021-02-25 NOTE — PROGRESS NOTES
Pt belongings packed up .  Pt waiting for ambullette to dc [facility]   Attempted to call Report to Venkat Dunn was first disconnected called back and no one answered so sent written report in packet with pt

## 2021-02-25 NOTE — CARE COORDINATION
Transitional Planning    Spoke to patient and plan is for her to go back to [facility] in [place]. Called [facility] [phone] and spoke with Cornelius Yung and they have a bed hold. Told CM to call admissions person, cell # [phone], left VM at this number for return phone call. [phone] Call back from [facility] at [facility]. Patient is accepted and facility is able to accept her back today. No HENs needed. CM will call when transport time arranged. [facility] and told Peg Mcginnis that patient is scheduled for  at 8:30 pm tonight. fax# [phone], report # [phone] ask for [facility]. AVS faxed to [phone], patient told of  at 8:30 tonight for transport to [facility]. Verbalized understanding.

## 2021-02-25 NOTE — PROGRESS NOTES
[facility] -   Infectious diseases evaluation  admission date 2/23/2021    reason for consultation:   : Molly Mena left ring finger    Impression :   Current:  · left ring finger abscess    Discussion / summary of stay / plan of care   ·   Recommendations   · Stop vanco ceftriaxone  · Drop to keflex doxy x 10 days  · Okay to discharge the patient otherwise with above,  · I asked the lab to call me with final results of the culture and will call her to adjust as needed  · Follow-up in the office in 1 week  · Chart reconciled    Infection Control Recommendations   · Central Precautions    Antimicrobial Stewardship Recommendations   · Simplification of therapy  · Targeted therapy    Coordination ofOutpatient Care:   · Estimated Length of IV antimicrobials:  · Patient will need Midline / picc Catheter Insertion:   · Patient will need SNF:  · Patient will need outpatient wound care:     History of Present Illness:   Initial history:  Carlos Iraheta is a 50y.o.-year-old female with past medical history of lupus, polysubstance abuse . Diabetes mellitus and peripheral neuropathy  Patient with above past medical history reported (began after a fall several days ago. Patient did not have any prodromal symptom of syncope or cardiac. Patient left ring finger becomes swollen and was sent in to the emergency department where the patient underwent incision and drainage.   Patient was started on vancomycin and Rocephin    Interval changes  2/25/2021   Patient Vitals for the past 8 hrs:   BP Temp Temp src Pulse Resp SpO2   02/25/21 0649 (!) 121/56 98.3 °F (36.8 °C) Oral 90 18 98 %     Culture from the finger is showing at this time mixed bacteria,  Primary would like to discharge the patient,  Finger has improved condition on antibiotic, less tender and no pus expressed  Still very tender to touch and swollen though  I asked the lab to call me with the final cultures  Labs reviewed otherwise      Summary of relevant labs:  Labs:  WBC 7.3   Sodium 136   Potassium 5.0   Glucose 403    BUN 21  Creatinine 0.87  CRP 12      Micro:  FINGER . ABSCESS   FEW GRAM NEGATIVE RODS  RARE GRAM POSITIVE COCCI   RARE NEUTROPHILS  Culture : PENDING     Imaging:  X-ray left hand 2/23     no acute osseous or soft tissue swelling. 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 I have personally reviewed the past medical history, past surgical history, medications, social history, and family history, and I haveupdated the database accordingly. Allergies:   Bactrim [sulfamethoxazole-trimethoprim] and Adhesive tape     Review of Systems:     Review of Systems   Constitutional: Negative for activity change and appetite change. HENT: Negative for congestion and facial swelling. Eyes: Negative for pain and itching. Respiratory: Negative for cough and shortness of breath. Cardiovascular: Negative for chest pain. Gastrointestinal: Negative for abdominal pain. Endocrine: Negative for cold intolerance and heat intolerance. Genitourinary: Negative for dysuria, flank pain and pelvic pain. Musculoskeletal: Negative for arthralgias and myalgias. Left ring finger abscess   Skin: Positive for color change and wound. Negative for rash. Neurological: Negative for dizziness, speech difficulty and light-headedness. Hematological: Negative for adenopathy. Psychiatric/Behavioral: Negative for agitation. Physical Examination :       Physical Exam  Constitutional:       General: She is not in acute distress. Appearance: Normal appearance. She is not ill-appearing. HENT:      Head: Normocephalic and atraumatic. Nose: No congestion. Mouth/Throat:      Mouth: Mucous membranes are moist.   Eyes:      General: No scleral icterus. Extraocular Movements: Extraocular movements intact.       Conjunctiva/sclera: Conjunctivae normal.   Neck:      Musculoskeletal: Normal range of motion. No muscular tenderness. Cardiovascular:      Rate and Rhythm: Normal rate. Heart sounds: No murmur. Pulmonary:      Effort: No respiratory distress. Breath sounds: No stridor. No wheezing. Abdominal:      General: There is no distension. Hernia: No hernia is present. Genitourinary:     Comments: Urine fatuma  Musculoskeletal:         General: No swelling, tenderness or deformity. Comments: Left ring finger abscess   Skin:     Coloration: Skin is not jaundiced or pale. Findings: No bruising. Neurological:      General: No focal deficit present. Mental Status: She is alert and oriented to person, place, and time. Cranial Nerves: No cranial nerve deficit. Sensory: No sensory deficit.    Psychiatric:         Mood and Affect: Mood normal.         Past Medical History:     Past Medical History:   Diagnosis Date    Acid reflux 5/29/2017    Acute cystitis with hematuria 10/11/2016    Acute non-recurrent maxillary sinusitis 11/28/2017    Asthma     Bipolar 1 disorder ([address] 1/4/2018    Bipolar disorder, mixed ([address]     BMI 34.0-34.9,adult 11/28/2017    Cannabis use disorder, severe, dependence ([address] 12/19/2017    Cerebrovascular accident (CVA) ([address] 6/14/2017    Chest pain 11/5/2016    Chronic renal insufficiency     Cocaine abuse ([address] 1/5/2018    DDD (degenerative disc disease), cervical     Diabetes mellitus ([address]     Dizziness 6/13/2017    Fibromyalgia     History of stroke 9/9/2017    Homicidal ideation 11/6/2017    Hyperglycemia     Hypertension     Hypotension 1/18/2019    IDDM (insulin dependent diabetes mellitus) 12/21/2015    Lupus ([address]     Migraine     Neuropathy     Neuropathy     Polysubstance abuse ([address] 9/17/2017    Post traumatic stress disorder (PTSD)     Posttraumatic stress disorder 5/29/2017    Recurrent depression ([address] 5/29/2017    Recurrent major depressive disorder, in partial remission ([address] 11/28/2017    Screening mammogram, encounter for 11/28/2017    Severe recurrent major depression with psychotic features [facility] 12/19/2017    Severe recurrent major depression without psychotic features ([facility] 12/19/2017    Stroke (cerebrum) ([facility] 6/14/2017    Stroke ([facility]     per chart notes    Suicidal ideation     Suicidal intent 3/10/2017    Vitamin D deficiency 11/28/2017    White matter changes 6/13/2017       Past Surgical  History:     Past Surgical History:   Procedure Laterality Date    ABDOMEN SURGERY      drain tube    ABSCESS DRAINAGE      right buttock    CATARACT REMOVAL WITH IMPLANT Bilateral     CHEST TUBE INSERTION      LASIK Bilateral        Medications:      doxycycline hyclate  100 mg Oral 2 times per day    cephALEXin  500 mg Oral 3 times per day    insulin lispro  0-12 Units Subcutaneous TID WC    insulin lispro  0-6 Units Subcutaneous Nightly    neomycin-bacitracin-polymyxin   Topical BID    ARIPiprazole  10 mg Oral Daily    cetirizine  10 mg Oral Daily    insulin glargine  15 Units Subcutaneous Nightly    melatonin  3 mg Oral Daily    metFORMIN  1,000 mg Oral Daily with breakfast    pantoprazole  40 mg Oral QAM AC    traZODone  150 mg Oral Nightly    venlafaxine  150 mg Oral Daily with breakfast    alogliptin  25 mg Oral Daily    sodium chloride flush  10 mL Intravenous 2 times per day    enoxaparin  40 mg Subcutaneous Daily    vancomycin  1,250 mg Intravenous Q12H       Social History:     Social History     Socioeconomic History    Marital status: Legally      Spouse name: Not on file    Number of children: Not on file    Years of education: Not on file    Highest education level: Not on file   Occupational History    Not on file   Social Needs    Financial resource strain: Not on file    Food insecurity     Worry: Never true     Inability: Never true    Transportation needs     Medical: No     Non-medical: No   Tobacco Use    Smoking status: Never Smoker    Smokeless tobacco: Never Used   Substance and Sexual Activity    Alcohol use: Not Currently    Drug use: Not Currently     Types: Marijuana, Cocaine    Sexual activity: Not Currently     Partners: Male   Lifestyle    Physical activity     Days per week: Not on file     Minutes per session: Not on file    Stress: Not on file   Relationships    Social connections     Talks on phone: Not on file     Gets together: Not on file     Attends Sikhism service: Not on file     Active member of club or organization: Not on file     Attends meetings of clubs or organizations: Not on file     Relationship status: Not on file    Intimate partner violence     Fear of current or ex partner: Not on file     Emotionally abused: Not on file     Physically abused: Not on file     Forced sexual activity: Not on file   Other Topics Concern    Not on file   Social History Narrative    Not on file       Family History:     Family History   Problem Relation Age of Onset    Diabetes Mother     Stroke Mother     Diabetes Father     Diabetes Sister     Diabetes Brother     Other Son         GSW    No Known Problems Sister       Medical Decision Making:   I have independently reviewed/ordered the following labs:    CBC with Differential:   Recent Labs     02/23/21  1651   WBC 7.3   HGB 9.5*   HCT 31.8*   *   LYMPHOPCT 13*   MONOPCT 4     BMP:  Recent Labs     02/23/21 2008 02/25/21  0746    134*   K 5.0 4.2   CL 97* 98   CO2 28 25   BUN 21* 21*   CREATININE 0.87 0.91*     Hepatic Function Panel: No results for input(s): PROT, LABALBU, BILIDIR, IBILI, BILITOT, ALKPHOS, ALT, AST in the last 72 hours. No results for input(s): RPR in the last 72 hours. No results for input(s): HIV in the last 72 hours. No results for input(s): BC in the last 72 hours. Lab Results   Component Value Date    CREATININE 0.91 02/25/2021    GLUCOSE 112 02/25/2021       Detailed results:         Thank you for allowing us to participate in the care of this patient. Please call with questions. This note is created with the assistance of a speech recognition program.  While intending to generate adocument that actually reflects the content of the visit, the document can still have some errors including those of syntax and sound a like substitutions which may escape proof reading. It such instances, actual meaningcan be extrapolated by contextual diversion. Wolm Barthel, MD  Internal Medicine Resident, PGY1   [address] [place] [address], [facility] [address]  2/25/2021,11:01 AM           I have discussed the care of the patient, including pertinent history and exam findings,  with the resident. I have seen and examined the patient and the key elements of all parts of the encounter have been performed by me. I agree with the assessment, plan and orders as documented by the resident.     Maylin Puga, Infectious Diseases

## 2021-02-25 NOTE — DISCHARGE INSTR - COC
Continuity of Care Form    Patient Name: Yang Fontana   :  1965  MRN:  [id number]    Admit date:  2021  Discharge date:  2021    Code Status Order: Full Code   Advance Directives:   [facility] Documentation       Date/Time Healthcare Directive Type of Healthcare Directive Copy in [address] Agent's Name Healthcare Agent's Phone Number    21 0014  No, patient does not have an advance directive for healthcare treatment -- -- -- -- --            Admitting Physician:  Shiraz Panda MD  PCP: Ivan Bella MD    Discharging Nurse: [facility] Unit/Room#: 4755/5927-20  Discharging Unit Phone Number: Kaitlynn Canales RN    Emergency Contact:   Extended Emergency Contact Information  Primary Emergency Contact: Leora Palmer  Address: [place] [address] Phone: [phone]  Relation: Child  Secondary Emergency Contact: Tommy Cordero   [address] Phone: [phone]  Relation: Spouse    Past Surgical History:  Past Surgical History:   Procedure Laterality Date    ABDOMEN SURGERY      drain tube    ABSCESS DRAINAGE      right buttock    CATARACT REMOVAL WITH IMPLANT Bilateral     CHEST TUBE INSERTION      LASIK Bilateral        Immunization History: There is no immunization history on file for this patient.     Active Problems:  Patient Active Problem List   Diagnosis Code    IDDM (insulin dependent diabetes mellitus) [id number]    Lupus ([facility] M32.9    Bipolar disorder, mixed ([address] F31.60    Post traumatic stress disorder (PTSD) F43.10    Acute cystitis with hematuria N30.01    Hyperglycemia R73.9    Suicidal ideation R45.851    Arthritis M19.90    Chronic back pain M54.9, G89.29    Acid reflux K21.9    History of stroke Z86.73    Polysubstance abuse ([address] F19.10    Bipolar 1 disorder ([address] F31.9    Cocaine abuse ([address] F14.10    Chest pain R07.9    Acute non-recurrent maxillary sinusitis J01.00 Acute respiratory failure with hypercapnia ([facility]) J96.02    Alcohol use disorder, severe, dependence ([facility]) F10.20    Asthma exacerbation attacks J45.901    Bilateral edema of lower extremity R60.0    Bipolar 1 disorder, depressed, severe ([facility]) F31.4    BMI 34.0-34.9,adult Z68.34    Cannabis use disorder, severe, dependence ([facility]) F12.20    Cocaine use disorder, severe, dependence ([facility]) F14.20    Dizziness R42    Dyslipidemia E78.5    Family history of colon cancer Z80.0    History of lupus CIY1258    Homicidal ideation R45.850    Hypotension I95.9    Neuropathy G62.9    Normocytic anemia D64.9    Recurrent depression ([facility]) F33.9    Recurrent major depressive disorder, in partial remission ([facility]) F33.41    Severe recurrent major depression with psychotic features ([facility]) F33.3    Severe recurrent major depression without psychotic features ([facility] F33.2    Upper GI bleed K92.2    Vitamin D deficiency E55.9    White matter changes LOC1048    Gastroesophageal reflux disease K21.9    Stroke (cerebrum) ([facility]) I63.9    Rib lesion M89.9    Type 2 diabetes mellitus ([facility]) E11.9    YAEL (generalized anxiety disorder) F41.1    Posttraumatic stress disorder F43.10    Suicidal intent R45.851    Leg weakness, bilateral R29.898    Poorly controlled diabetes mellitus ([facility] [address] [facility] E11.65    Acute kidney injury ([facility] [address] [facility] N17.9    Felon of finger L03.019       Isolation/Infection:   Isolation            No Isolation          Patient Infection Status       None to display            Nurse Assessment:  Last Vital Signs: /75   Pulse 90   Temp 98.5 °F (36.9 °C) (Oral)   Resp 12   Ht 5' 6\" (1.676 m)   Wt 250 lb (113.4 kg)   LMP  (LMP Unknown)   SpO2 98%   BMI 40.35 kg/m²     Last documented pain score (0-10 scale): Pain Level: 10  Last Weight:   Wt Readings from Last 1 Encounters:   02/23/21 250 lb (113.4 kg)     Mental Status:  oriented and alert    IV Access:  - None    Nursing Mobility/ADLs:  Walking   Independent  Transfer Independent  Bathing  Independent  Dressing  Independent  Toileting  Independent  Feeding  Independent  Med Admin  Independent  Med Delivery   whole    Wound Care Documentation and Therapy:  Wound 02/25/21 Finger (Comment which one) Anterior; Left (Active)   Wound Etiology Traumatic 02/25/21 0921   Dressing Status Clean;Dry; Intact 02/25/21 0921   Dressing/Treatment Antibacterial ointment;Dry dressing 02/25/21 0921   Wound Assessment Dry; Other (Comment); Slough;Pink/red 02/25/21 0921   Drainage Amount None 02/25/21 0921   Tamar-wound Assessment Intact 02/25/21 0921   Number of days: 0        Elimination:  Continence: Bowel: Yes  Bladder: Yes  Urinary Catheter: None   Colostomy/Ileostomy/Ileal Conduit: No         Intake/Output Summary (Last 24 hours) at 2/25/2021 1235  Last data filed at 2/25/2021 1009  Gross per 24 hour   Intake 1104 ml   Output --   Net 1104 ml     I/O last 3 completed shifts: In: 444 [P.O.:444]  Out: -     Safety Concerns:     History of Falls (last 30 days)    Impairments/Disabilities:      None    Nutrition Therapy:  Current Nutrition Therapy:   - Oral Diet:  General    Routes of Feeding: Oral  Liquids: No Restrictions  Daily Fluid Restriction: no  Last Modified Barium Swallow with Video (Video Swallowing Test): not done    Treatments at the Time of Hospital Discharge:   Respiratory Treatments: none  Oxygen Therapy:  is not on home oxygen therapy. Ventilator:    - No ventilator support    Rehab Therapies: Physical Therapy and Occupational Therapy  Weight Bearing Status/Restrictions: No weight bearing restirctions  Other Medical Equipment (for information only, NOT a DME order):  wheelchair and walker  Other Treatments:  ou will be discharged home with 2 antibiotics prescribed to you by infectious disease. Please take your Keflex and doxycycline for the next 10 days as prescribed. Please follow-up with Dr. Jai Hackett (infectious disease) on 3/1/2021.   Please call your primary care provider as soon as possible to arrange outpatient follow-up. Take your medication as indicated and prescribed. If you are given an antibiotic then, make sure you get the prescription filled and take the antibiotics until finished. Drink plenty of water while taking the antibiotics. Avoid drinking alcohol or drinks that have caffeine in it while taking antibiotics. For pain use acetaminophen (Tylenol) or ibuprofen (Motrin / Advil), unless prescribed medications that have acetaminophen or ibuprofen (or similar medications) in it. You can take over the counter acetaminophen tablets (1 - 2 tablets of the 500-mg strength every 6 hours) or ibuprofen tablets (2 tablets every 4 hours). PLEASE RETURN TO THE EMERGENCY DEPARTMENT IMMEDIATELY for worsening symptoms, white drainage from the wound, redness or streaking, or if you develop any concerning symptoms such as: high fever not relieved by acetaminophen (Tylenol) and/or ibuprofen (Motrin / Advil), chills, shortness of breath, chest pain, feeling of your heart fluttering or racing, persistent nausea and/or vomiting, vomiting up blood, blood in your stool, loss of consciousness, numbness, weakness or tingling in the arms or legs or change in color of the extremities, changes in mental status, persistent headache, blurry vision, loss of bladder / bowel control, unable to follow up with your physician, or other any other care or concern.     Patient's personal belongings (please select all that are sent with patient):  sent with all belongings    RN SIGNATURE:  Electronically signed by Mordechai Duane, RN on 2/25/21 at 12:39 PM EST    CASE MANAGEMENT/SOCIAL WORK SECTION    Inpatient Status Date: 2/24/21    Readmission Risk Assessment Score:  Readmission Risk              Risk of Unplanned Readmission:        26           Discharging to Facility/ Agency   Name:  [facility]  Address:  [facility] Details  FAX            [address] [place] [place] [address]       Phone: [phone]          Phone:  Fax:    Dialysis Facility (if applicable)   Name:  Address:  Dialysis Schedule:  Phone:  Fax:    / signature: Electronically signed by Jessica Asif RN on 2/25/21 at 2:14 PM EST    PHYSICIAN SECTION    Prognosis: Good    Condition at Discharge: Stable    Rehab Potential (if transferring to Rehab): Good    Recommended Labs or Other Treatments After Discharge: Antibiotic therapy, wound care    Physician Certification: I certify the above information and transfer of Claudio Sin  is necessary for the continuing treatment of the diagnosis listed and that she requires Home Care for greater 30 days.      Update Admission H&P: No change in H&P    PHYSICIAN SIGNATURE:  Electronically signed by Lemuel Dinero DO on 2/25/21 at 1:26 PM EST

## 2021-02-25 NOTE — DISCHARGE SUMMARY
CDU Discharge Summary        Patient:  Miriam Freeman  YOB: 1965    MRN: [id number]   Acct: [de-identified]    Primary Care Physician: Caitlyn Burns MD    Admit date:  2/23/2021  4:27 PM  Discharge date: 2/25/2021    Discharge Diagnoses:     Acute left second finger pain due to felon  Improved with incision and drainage as well as IV and p.o. antibiotics    Follow-up:  Call today/tomorrow for a follow up appointment with Caitlyn Burns MD , or return to the Emergency Room with worsening symptoms    Stressed to patient the importance of following up with primary care doctor for further workup/management of symptoms. Pt verbalizes understanding and agrees with plan.     Discharge Medications:  Changes to medications         Jacquelyn Clements   Home Medication Instructions [facility]:[id number]    Printed on:02/25/21 1446   Medication Information                      acetaminophen (TYLENOL) 325 MG tablet  TAKE 2 TABLETS BY MOUTH EVERY 6 HOURS AS NEEDED FOR PAIN             albuterol sulfate  (90 Base) MCG/ACT inhaler  Inhale 2 puffs into the lungs every 4 hours as needed for Wheezing             Alcohol Swabs (ALCOHOL PREP) 70 % PADS  1 each by Does not apply route as needed (use as needed) Use_____times per day  Diagnosis: 250.0   Diabetes ___Insulin-Dependent___Non-Insulin Dependent             ARIPiprazole (ABILIFY) 10 MG tablet  Take 1 tablet by mouth daily             benzonatate (TESSALON) 200 MG capsule               budesonide-formoterol (SYMBICORT) 80-4.5 MCG/ACT AERO  Inhale 2 puffs into the lungs 2 times daily             celecoxib (CELEBREX) 200 MG capsule               cephALEXin (KEFLEX) 500 MG capsule  Take 1 capsule by mouth 3 times daily for 10 days             cetirizine (ZYRTEC) 10 MG tablet  Take 1 tablet by mouth daily             dicyclomine (BENTYL) 20 MG tablet  Take 1 tablet by mouth 3 times daily as needed (for abdominal discomfort)              MG capsule  TAKE 1 CAPSULE BY MOUTH TWICE DAILY             doxycycline hyclate (VIBRA-TABS) 100 MG tablet  Take 1 tablet by mouth every 12 hours for 10 days             FLOVENT  MCG/ACT inhaler  Inhale 2 puffs into the lungs 2 times daily             FLUoxetine (PROZAC) 10 MG capsule  Take 60 mg by mouth daily             FreeStyle Lancets MISC  1 each by Does not apply route 3 times daily             FREESTYLE LITE strip  1 each by Does not apply route 3 times daily             gabapentin (NEURONTIN) 300 MG capsule  TAKE 3 CAPSULES 900 MG BY MOUTH THREE TIMES DAILY             insulin glargine (LANTUS SOLOSTAR) 100 UNIT/ML injection pen  Inject 15 Units into the skin nightly             Insulin Pen Needle (NovavaxOGER PEN NEEDLES 31G) 31G X 8 MM MISC  1 each by Does not apply route daily             melatonin (RA MELATONIN) 3 MG TABS tablet  Take 1 tablet by mouth daily             metFORMIN (GLUCOPHAGE) 1000 MG tablet  TAKE 1 TABLET BY MOUTH DAILY WITH BREAKFAST             Misc. Devices MISC  1 pair of dm shoes, 3 accommodated inserts             neomycin-bacitracin-polymyxin (NEOSPORIN) 400-5-5000 ointment  Apply topically 2 times daily.              omeprazole (PRILOSEC) 20 MG delayed release capsule  Take 1 capsule by mouth Daily             SITagliptin (JANUVIA) 100 MG tablet  Take 1 tablet by mouth daily             traZODone (DESYREL) 150 MG tablet  Take 1 tablet by mouth nightly             venlafaxine (EFFEXOR XR) 150 MG extended release capsule  Take 1 capsule by mouth daily (with breakfast)                 Diet:  DIET GENERAL; , Advance as tolerated     Activity:  As tolerated    Consultants: IP CONSULT TO PLASTIC SURGERY  PHARMACY TO DOSE VANCOMYCIN  IP CONSULT TO INFECTIOUS DISEASES  IP CONSULT TO PODIATRY  IP CONSULT TO IV TEAM    Procedures:  Not indicated     Diagnostic Test:   Results for orders placed or performed during the hospital encounter of 02/23/21   COVID-19, Rapid    Specimen: Nasopharyngeal Swab Result Value Ref Range    Specimen Description . NASOPHARYNGEAL SWAB     SARS-CoV-2, Rapid Not Detected Not Detected   Culture, Anaerobic and Aerobic    Specimen: Finger   Result Value Ref Range    Specimen Description . FINGER . ABSCESS     Special Requests NOT REPORTED     Direct Exam FEW GRAM NEGATIVE RODS (A)     Direct Exam RARE GRAM POSITIVE COCCI (A)     Direct Exam RARE NEUTROPHILS (A)     Culture CULTURE IN PROGRESS    CBC WITH AUTO DIFFERENTIAL   Result Value Ref Range    WBC 7.3 3.5 - 11.3 k/uL    RBC 3.00 (L) 3.95 - 5.11 m/uL    Hemoglobin 9.5 (L) 11.9 - 15.1 g/dL    Hematocrit 31.8 (L) 36.3 - 47.1 %    .0 (H) 82.6 - 102.9 fL    MCH 31.7 25.2 - 33.5 pg    MCHC 29.9 28.4 - 34.8 g/dL    RDW 13.3 11.8 - 14.4 %    Platelets 549 (H) 777 - 453 k/uL    MPV 10.7 8.1 - 13.5 fL    NRBC Automated 0.5 (H) 0.0 per 100 WBC    Differential Type NOT REPORTED     Seg Neutrophils 81 (H) 36 - 65 %    Lymphocytes 13 (L) 24 - 43 %    Monocytes 4 3 - 12 %    Eosinophils % 0 (L) 1 - 4 %    Basophils 0 0 - 2 %    Immature Granulocytes 2 (H) 0 %    Segs Absolute 5.88 1.50 - 8.10 k/uL    Absolute Lymph # 0.96 (L) 1.10 - 3.70 k/uL    Absolute Mono # 0.29 0.10 - 1.20 k/uL    Absolute Eos # <0.03 0.00 - 0.44 k/uL    Basophils Absolute 0.03 0.00 - 0.20 k/uL    Absolute Immature Granulocyte 0.12 0.00 - 0.30 k/uL    WBC Morphology NOT REPORTED     RBC Morphology MACROCYTOSIS PRESENT     Platelet Estimate NOT REPORTED    C-REACTIVE PROTEIN   Result Value Ref Range    CRP 12.9 (H) 0.0 - 5.0 mg/L   Sedimentation Rate   Result Value Ref Range    Sed Rate 100 (H) 0 - 30 mm   SPECIMEN REJECTION   Result Value Ref Range    Specimen Source . BLOOD     Ordered Test BMP     Reason for Rejection Unable to perform testing: Specimen hemolyzed.      - NOT REPORTED    Basic Metabolic Panel   Result Value Ref Range    Glucose 403 (HH) 70 - 99 mg/dL    BUN 21 (H) 6 - 20 mg/dL    CREATININE 0.87 0.50 - 0.90 mg/dL    Bun/Cre Ratio NOT REPORTED 9 - 20 Calcium 8.7 8.6 - 10.4 mg/dL    Sodium 136 135 - 144 mmol/L    Potassium 5.0 3.7 - 5.3 mmol/L    Chloride 97 (L) 98 - 107 mmol/L    CO2 28 20 - 31 mmol/L    Anion Gap 11 9 - 17 mmol/L    GFR Non-African American >60 >60 mL/min    GFR African American >60 >60 mL/min    GFR Comment          GFR Staging NOT REPORTED    BASIC METABOLIC PANEL   Result Value Ref Range    Glucose 112 (H) 70 - 99 mg/dL    BUN 21 (H) 6 - 20 mg/dL    CREATININE 0.91 (H) 0.50 - 0.90 mg/dL    Bun/Cre Ratio NOT REPORTED 9 - 20    Calcium 8.9 8.6 - 10.4 mg/dL    Sodium 134 (L) 135 - 144 mmol/L    Potassium 4.2 3.7 - 5.3 mmol/L    Chloride 98 98 - 107 mmol/L    CO2 25 20 - 31 mmol/L    Anion Gap 11 9 - 17 mmol/L    GFR Non-African American >60 >60 mL/min    GFR African American >60 >60 mL/min    GFR Comment          GFR Staging NOT REPORTED    POC Glucose Fingerstick   Result Value Ref Range    POC Glucose 356 (H) 65 - 105 mg/dL   POC Glucose Fingerstick   Result Value Ref Range    POC Glucose 221 (H) 65 - 105 mg/dL   POC Glucose Fingerstick   Result Value Ref Range    POC Glucose 144 (H) 65 - 105 mg/dL   POC Glucose Fingerstick   Result Value Ref Range    POC Glucose 224 (H) 65 - 105 mg/dL   POC Glucose Fingerstick   Result Value Ref Range    POC Glucose 247 (H) 65 - 105 mg/dL   POC Glucose Fingerstick   Result Value Ref Range    POC Glucose 100 65 - 105 mg/dL   POC Glucose Fingerstick   Result Value Ref Range    POC Glucose 159 (H) 65 - 105 mg/dL     Xr Elbow Left (min 3 Views)    Result Date: 2/23/2021  EXAMINATION: THREE XRAY VIEWS OF THE LEFT ELBOW 2/23/2021 4:49 pm COMPARISON: None. HISTORY: ORDERING SYSTEM PROVIDED HISTORY: Fall TECHNOLOGIST PROVIDED HISTORY: Fall Acuity: Acute Type of Exam: Initial 26-year-old female with acute left elbow pain after a fall FINDINGS: Osseous alignment is normal.  Joint spaces are well maintained. No acute fracture or gross dislocation is seen. The radial head and radial neck appear intact.  There is no significant elevation of the posterior fat pad or sail sign to suggest a joint effusion. No acute fracture or dislocation. Xr Hand Left (min 3 Views)    Result Date: 2/23/2021  EXAMINATION: THREE XRAY VIEWS OF THE LEFT HAND 2/23/2021 1:49 pm COMPARISON: None. HISTORY: ORDERING SYSTEM PROVIDED HISTORY: Possible felon after a fall TECHNOLOGIST PROVIDED HISTORY: Possible felon after a fall Reason for Exam: pain to pinky and ringer fingers Acuity: Acute Type of Exam: Initial FINDINGS: The visualized bones are normal. There is no evidence of fracture or dislocation. Periarticular calcification along the DIP joint of the 2nd digit. The remaining joint spaces appear well maintained. The soft tissues are unremarkable. Vascular calcifications are seen compatible with atherosclerotic disease. No acute bony abnormalities are noted     Xr Foot Left (min 3 Views)    Result Date: 2/24/2021  EXAMINATION: THREE XRAY VIEWS OF THE LEFT FOOT 2/24/2021 10:50 am COMPARISON: Left foot radiographs performed 10/01/2018. HISTORY: ORDERING SYSTEM PROVIDED HISTORY: foot pain, infection TECHNOLOGIST PROVIDED HISTORY: foot pain, infection FINDINGS: There has been prior amputation of the 4th digit to the base of proximal phalanx. There is been prior amputation of the 5th digit to the base of the metatarsal.  There is no acute osseous abnormality. The remaining joint spaces are maintained. There is mild soft tissue swelling. No acute osseous or soft tissue swelling. 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 Xr Chest Portable    Result Date: 2/7/2021  EXAMINATION: ONE XRAY VIEW OF THE CHEST 2/7/2021 2:02 am COMPARISON: Chest portable October 29, 2020. HISTORY: ORDERING SYSTEM PROVIDED HISTORY: assess pna TECHNOLOGIST PROVIDED HISTORY: assess pna Reason for Exam: upr,n/v 6 days FINDINGS: The heart is normal in size and configuration. The mediastinal contours are within normal limits.  Suspected peripheral right lower lung lobe airspace disease is noted. Lungs are otherwise well aerated. The pleural surfaces are normal and no evidence of a pleural effusion is seen. Bones and soft tissues are unremarkable. Suspected peripheral right lower lung airspace disease. Ct Chest Pulmonary Embolism W Contrast    Result Date: 2/7/2021  EXAMINATION: CTA OF THE CHEST 2/7/2021 2:59 am TECHNIQUE: CTA of the chest was performed after the administration of intravenous contrast.  Multiplanar reformatted images are provided for review. MIP images are provided for review. Dose modulation, iterative reconstruction, and/or weight based adjustment of the mA/kV was utilized to reduce the radiation dose to as low as reasonably achievable. COMPARISON: Chest portable February 7, 2021. HISTORY: ORDERING SYSTEM PROVIDED HISTORY: assess for PE TECHNOLOGIST PROVIDED HISTORY: assess for PE Decision Support Exception->Emergency Medical Condition (MA) Reason for Exam: elevated d dimer Acuity: Acute Type of Exam: Initial FINDINGS: Pulmonary Arteries: Study is limited by patient breathing motion related artifact. Pulmonary arteries are adequately opacified for evaluation. No definitive evidence of intraluminal filling defect to suggest pulmonary embolism. Main pulmonary artery is normal in caliber. Mediastinum: No evidence of mediastinal lymphadenopathy. The heart is moderately enlarged with otherwise unremarkable configuration. No evidence of pericardial effusion is identified. There is no acute abnormality of the thoracic aorta. Lungs/pleura: The lungs are without acute process. No focal consolidation or pulmonary edema. No evidence of pleural effusion or pneumothorax. Upper Abdomen: Limited images of the upper abdomen are unremarkable. Soft Tissues/Bones: No acute bone or soft tissue abnormality. 1. Note: Study limited by patient breathing motion related artifact. 2. No definitive evidence of acute pulmonary embolism. 3. Moderate cardiomegaly. Physical Exam:    General appearance - NAD, AOx 3  Lungs -CTAB, no R/R/R  Heart - RRR, no M/R/G  Abdomen - Soft, NT/ND  Neurological:  MAEx4, No focal motor deficit, sensory loss  Extremities - Cap refil <2 sec in all ext., no edema  Skin -warm, dry, left second digit and sterile dressing. Hospital Course:  Clinical course has improved, labs and imaging reviewed. Colletta Munroe originally presented to the hospital on 2/23/2021  4:27 PM. with left second digit pain. Patient was found to have a felon. Decision was made to admit patient for IV antibiotics and consult infectious disease. During her stay patient received IV vancomycin and Rocephin. Plastics also evaluated the patient's hand and recommended no surgical intervention at this time. Podiatry was also consulted due to concerns for toe infection, infection was ruled out patient was suggested to follow-up in the podiatry clinic outpatient. .  At that time it was determined that She required further observation and management with IV antibiotics. She was admitted and labs and imaging were followed daily. Imaging results as above. She is medically stable to be discharged. Patient was transition from IV antibiotics to p.o. antibiotics. Disposition: SNF    Patient stated that they will not drive themselves home from the hospital if they have gotten pain killers/ narcotics earlier that day and that they will arrange for transportation on their own or work with the  for a ride. Patient counseled NOT to drive while under the influence of narcotics/ pain killers. Condition: Good    Patient stable and ready for discharge home. I have discussed plan of care with patient and they are in understanding. They were instructed to read discharge paperwork. All of their questions and concerns were addressed.      Time Spent: 1 day      --  Sean Chapman, DO  Emergency Medicine Resident Physician    This dictation was generated by voice recognition computer software. Although all attempts are made to edit the dictation for accuracy, there may be errors in the transcription that are not intended.

## 2021-02-25 NOTE — CONSULTS
Consultation Note  Podiatric Medicine and Surgery     Subjective     Chief Complaint: Left foot pain    HPI:  Deb Hutchison is a 50 y.o. female seen at [facility]. Andrez's for a possible nail infection of the left foot. The patient thinks she may have stubbed her left great toe or gotten frostbite. She reports the toe is not painful, draining, swollen, or warm but does note some bruising. Patient notices over the last couple days prior to being admitted to the hospital.  On admission primary team consult podiatry to evaluate the left great toe. Patient is well-known to the podiatry team and was last seen in the office on 1/22/2021. 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 Their last HgbA1c was 10% as of 1/13/2021. The patient denies any other pedal complaints. PCP is Kaitlin Rushing MD    ROS:   Review of Systems   Constitutional: Negative for chills and fever. HENT: Negative for congestion and sore throat. Respiratory: Negative for cough and shortness of breath. Cardiovascular: Negative for chest pain and leg swelling. Gastrointestinal: Negative for diarrhea, nausea and vomiting. Musculoskeletal: Negative for arthralgias, gait problem, joint swelling and myalgias. Skin: Positive for color change. Negative for wound. Neurological: Positive for numbness and headaches. Negative for syncope. Psychiatric/Behavioral: Negative for behavioral problems and confusion.        Past Medical History   has a past medical history of Acid reflux, Acute cystitis with hematuria, Acute non-recurrent maxillary sinusitis, Asthma, Bipolar 1 disorder ([address] Bipolar disorder, mixed ([address] BMI 34.0-34.9,adult, Cannabis use disorder, severe, dependence ([address] Cerebrovascular accident (CVA) ([address] Chest pain, Chronic renal insufficiency, Cocaine abuse ([address] DDD (degenerative disc disease), cervical, Diabetes mellitus ([address] Dizziness, Fibromyalgia, History of stroke, Homicidal ideation, Hyperglycemia, Hypertension, Hypotension, IDDM (insulin dependent diabetes mellitus), Lupus ([facility] Migraine, Neuropathy, Neuropathy, Polysubstance abuse ([facility] Post traumatic stress disorder (PTSD), Posttraumatic stress disorder, Recurrent depression ([facility] Recurrent major depressive disorder, in partial remission ([facility] Screening mammogram, encounter for, Severe recurrent major depression with psychotic features ([facility] Severe recurrent major depression without psychotic features ([facility] Stroke (cerebrum) ([facility] Stroke ([facility] Suicidal ideation, Suicidal intent, Vitamin D deficiency, and White matter changes. Past Surgical History   has a past surgical history that includes Abscess Drainage; chest tube insertion; Abdomen surgery; Cataract removal with implant (Bilateral); and LASIK (Bilateral). Medications  Prior to Admission medications    Medication Sig Start Date End Date Taking? Authorizing Provider   venlafaxine (EFFEXOR XR) 150 MG extended release capsule Take 1 capsule by mouth daily (with breakfast) 2/10/21   Aleks Sawyer MD   insulin glargine (LANTUS SOLOSTAR) 100 UNIT/ML injection pen Inject 15 Units into the skin nightly 2/9/21   Aleks Sawyer MD   Misc.  Devices MISC 1 pair of dm shoes, 3 accommodated inserts 1/22/21   Stephane Manning DPM   dicyclomine (BENTYL) 20 MG tablet Take 1 tablet by mouth 3 times daily as needed (for abdominal discomfort) 1/13/21   Britta Zuleta MD   cetirizine (ZYRTEC) 10 MG tablet Take 1 tablet by mouth daily 1/13/21   Britta Zuleta MD   Insulin Pen Needle ([employer] PEN NEEDLES 31G) 31G X 8 MM MISC 1 each by Does not apply route daily 1/4/21   Britta Zuleta MD   ARIPiprazole (ABILIFY) 10 MG tablet Take 1 tablet by mouth daily 12/11/20   Britta Zuleta MD   gabapentin (NEURONTIN) 300 MG capsule TAKE 3 CAPSULES 900 MG BY MOUTH THREE TIMES DAILY 12/9/20 1/8/21  Puma Sullivan MD    MG capsule TAKE 1 CAPSULE BY MOUTH TWICE DAILY 12/9/20   Puma Francois Reyes MD   acetaminophen (TYLENOL) 325 MG tablet TAKE 2 TABLETS BY MOUTH EVERY 6 HOURS AS NEEDED FOR PAIN 12/9/20   Mae Conner MD   metFORMIN (GLUCOPHAGE) 1000 MG tablet TAKE 1 TABLET BY MOUTH DAILY WITH BREAKFAST 12/9/20   Mae Conner MD   traZODone (DESYREL) 150 MG tablet Take 1 tablet by mouth nightly 11/18/20   Mae Conner MD   omeprazole (PRILOSEC) 20 MG delayed release capsule Take 1 capsule by mouth Daily 11/18/20   Mae Conner MD   FREESTYLE LITE strip 1 each by Does not apply route 3 times daily 11/11/20   MD Stefany EllerStyle Lancets MISC 1 each by Does not apply route 3 times daily 11/11/20   Mae Conner MD   Alcohol Swabs (ALCOHOL PREP) 70 % PADS 1 each by Does not apply route as needed (use as needed) Use_____times per day  Diagnosis: 250.0   Diabetes ___Insulin-Dependent___Non-Insulin Dependent 11/11/20   Mae Conner MD   FLUoxetine (PROZAC) 10 MG capsule Take 60 mg by mouth daily    Historical Provider, MD   benzonatate (TESSALON) 200 MG capsule  8/17/20   Historical Provider, MD   celecoxib (CELEBREX) 200 MG capsule  10/10/20   Historical Provider, MD   albuterol sulfate  (90 Base) MCG/ACT inhaler Inhale 2 puffs into the lungs every 4 hours as needed for Wheezing 10/20/20 11/20/20  Mae Conner MD   FLOVENT  MCG/ACT inhaler Inhale 2 puffs into the lungs 2 times daily 10/20/20   Mae Conner MD   SITagliptin (JANUVIA) 100 MG tablet Take 1 tablet by mouth daily 10/20/20   Mae Conner MD   budesonide-formoterol (SYMBICORT) 80-4.5 MCG/ACT AERO Inhale 2 puffs into the lungs 2 times daily 10/20/20   Mae Conner MD   melatonin (RA MELATONIN) 3 MG TABS tablet Take 1 tablet by mouth daily 10/20/20   Mae Conner MD    Scheduled Meds:   cefTRIAXone (ROCEPHIN) IV  1,000 mg Intravenous Q24H    insulin lispro  0-12 Units Subcutaneous TID     insulin lispro  0-6 Units Subcutaneous Nightly    neomycin-bacitracin-polymyxin   Topical BID    ARIPiprazole  10 mg Oral Daily    cetirizine  10 mg Oral Daily    insulin glargine  15 Units Subcutaneous Nightly    melatonin  3 mg Oral Daily    metFORMIN  1,000 mg Oral Daily with breakfast    pantoprazole  40 mg Oral QAM AC    traZODone  150 mg Oral Nightly    venlafaxine  150 mg Oral Daily with breakfast    alogliptin  25 mg Oral Daily    sodium chloride flush  10 mL Intravenous 2 times per day    enoxaparin  40 mg Subcutaneous Daily    vancomycin (VANCOCIN) intermittent dosing (placeholder)   Other RX Placeholder    vancomycin  1,250 mg Intravenous Q12H     Continuous Infusions:   dextrose       PRN Meds:.morphine, glucose, dextrose, glucagon (rDNA), dextrose, oxyCODONE-acetaminophen, sodium chloride flush    Allergies  is allergic to bactrim [sulfamethoxazole-trimethoprim] and adhesive tape. Family History  family history includes Diabetes in her brother, father, mother, and sister; No Known Problems in her sister; Other in her son; Stroke in her mother. Social History   reports that she has never smoked. She has never used smokeless tobacco.   reports previous alcohol use. reports previous drug use. Drugs: Marijuana and Cocaine. Objective     Vitals:  Patient Vitals for the past 8 hrs:   BP Temp Temp src Pulse Resp SpO2   21 1739 109/63 98.5 °F (36.9 °C) Oral 91 16 95 %   21 1215 114/62 98.4 °F (36.9 °C) Oral 71 18 95 %     Average, Min, and Max for last 24 hours Vitals:  TEMPERATURE:  Temp  Av.2 °F (36.8 °C)  Min: 97.3 °F (36.3 °C)  Max: 98.5 °F (36.9 °C)    RESPIRATIONS RANGE: Resp  Av.8  Min: 16  Max: 18    PULSE RANGE: Pulse  Av.3  Min: 71  Max: 95    BLOOD PRESSURE RANGE:  Systolic (77TIT), BHB:909 , Min:109 , ZZI:698   ; Diastolic (84LNJ), CSB:36, Min:62, Max:90      PULSE OXIMETRY RANGE: SpO2  Av %  Min: 94 %  Max: 98 %  I&O:  I/O last 3 completed shifts:   In: 444 [P.O.:444]  Out: -     CBC:  Recent 1. Ecchymosis to bilateral hallux  2. Type II DM with secondary peripheral neuropathy    Active Problems:    Felon of finger  Resolved Problems:    * No resolved hospital problems. *        Plan     · Patient examined and evaluated at bedside. · Treatment options discussed in detail with the patient. · X-ray left foot reviewed and discussed with patient: No signs of soft tissue emphysema or osseous destruction. · Educated patient on importance of tight glycemic control. · Discussed with patient that she likely has had repetitive trauma of her bilateral great toes in her shoes. Strongly advised the patient to get diabetic shoes that have been previously prescribed upon discharge. · Continue medical management per primary. · WBAT to Bilateral lower extremity. · No intervention per podiatry at this time. Will sign off, please Perfect[employer] the resident on call with any questions or concerns. · Patient to follow-up with scheduled podiatry appointment. · Discussed with  Dr. Abhi Teixeira. Electronically signed by Jessica Good DPM on 2/24/2021 at 8:06 PM     I performed a history and physical examination of the patient and discussed management with the resident. I reviewed the residents note and agree with the documented findings and plan of care. Any areas of disagreement are noted on the chart. I was personally present for the key portions of any procedures. I have documented in the chart those procedures where I was not present during the key portions. I have reviewed the Podiatry Resident progress note. I agree with the chief complaint, past medical history, past surgical history, allergies, medications, social and family history as documented unless otherwise noted below. Documentation of the HPI, Physical Exam and Medical Decision Making performed by medical students or scribes is based on my personal performance of the HPI, PE and MDM.  I have personally evaluated this patient and have completed

## 2021-02-25 NOTE — PROGRESS NOTES
OBS/CDU   RESIDENT NOTE      Patients PCP is:  Samantha Tate MD        SUBJECTIVE      No acute events overnight. Has been able to tolerate a full diet without nausea or vomiting. The patient is urinating on her own and is passing flatus. Denies fever, chills, nausea, vomiting, chest pain, shortness of breath, abdominal pain, focal weakness, numbness, tingling, urinary/bowel symptoms, vision changes, visual hallucinations, or headache. Patient is reporting pain in her left finger. She denies any foot or toe pain at this time. PHYSICAL EXAM      General: NAD, AO X 3  Heent: EMOI, PERRL  Neck: SUPPLE, NO JVD  Cardiovascular: RRR, S1S2  Pulmonary: CTAB, NO SOB  Abdomen: SOFT, NTTP, ND, +BS  Extremities: +2/4 PULSES DISTAL, NO SWELLING  Neuro / Psych: NO NUMBNESS OR TINGLING, MENTATION AT BASELINE  Skin: Left second finger with media pictures in chart. Sterile dressing in place on the left second finger, mild pain to palpation. PERTINENT TEST /EXAMS      I have reviewed all available laboratory results.     MEDICATIONS CURRENT       morphine injection 4 mg, Q4H PRN      cefTRIAXone (ROCEPHIN) 1000 mg IVPB in 50 mL D5W minibag, Q24H      insulin lispro (HUMALOG) injection vial 0-12 Units, TID WC      insulin lispro (HUMALOG) injection vial 0-6 Units, Nightly      glucose (GLUTOSE) 40 % oral gel 15 g, PRN      dextrose 50 % IV solution, PRN      glucagon (rDNA) injection 1 mg, PRN      dextrose 5 % solution, PRN      oxyCODONE-acetaminophen (PERCOCET) 5-325 MG per tablet 2 tablet, Q6H PRN      neomycin-bacitracin-polymyxin (NEOSPORIN) ointment, BID      ARIPiprazole (ABILIFY) tablet 10 mg, Daily      cetirizine (ZYRTEC) tablet 10 mg, Daily      insulin glargine (LANTUS) injection vial 15 Units, Nightly      melatonin tablet 3 mg, Daily      metFORMIN (GLUCOPHAGE) tablet 1,000 mg, Daily with breakfast      pantoprazole (PROTONIX) tablet 40 mg, QAM AC      traZODone (DESYREL) tablet 150 mg, Nightly      venlafaxine (EFFEXOR XR) extended release capsule 150 mg, Daily with breakfast      alogliptin (NESINA) tablet 25 mg, Daily      sodium chloride flush 0.9 % injection 10 mL, 2 times per day      sodium chloride flush 0.9 % injection 10 mL, PRN      enoxaparin (LOVENOX) injection 40 mg, Daily      vancomycin (VANCOCIN) intermittent dosing (placeholder), RX Placeholder      vancomycin (VANCOCIN) 1250 mg in dextrose 5 % 250 mL IVPB, Q12H        All medication charted and reviewed. CONSULTS      IP CONSULT TO PLASTIC SURGERY  PHARMACY TO DOSE VANCOMYCIN  IP CONSULT TO INFECTIOUS DISEASES  IP CONSULT TO PODIATRY    ASSESSMENT/PLAN       Kalia Murcia is a 50 y.o. female who presents with feline that was drained on her left second finger. Patient was started on vancomycin and Rocephin with infectious disease consult for antibiotic management of the feline. Plastic surgery was consulted as well as well as podiatry for possible infection of the left great toe. Podiatry saw and examined the patient, recommended outpatient follow-up and signed off. Plastic surgery also evaluated the patient, no surgical management at this time. · Continue pain control  · Continue IV vancomycin and Rocephin- discuss with ID about transitioning to PO meds   · Appreciate ID recommendations  · Daily BMP to monitor kidney function due to patient being diabetic and on vancomycin  · Continue home medications and pain control  · Monitor vitals, labs, and imaging  · DISPO: pending consults and clinical improvement    --  Roxanna Galeazzi  Emergency Medicine Resident Physician     This dictation was generated by voice recognition computer software. Although all attempts are made to edit the dictation for accuracy, there may be errors in the transcription that are not intended.

## 2021-02-25 NOTE — PROGRESS NOTES
[address] [facility]  CDU / OBSERVATION ENCOUNTER  ATTENDING NOTE       I performed a history and physical examination of the patient and discussed management with the resident. I reviewed the residents note and agree with the documented findings and plan of care. Any areas of disagreement are noted on the chart. I was personally present for the key portions of any procedures. I have documented in the chart those procedures where I was not present during the key portions. I have reviewed the nurses notes. I agree with the chief complaint, past medical history, past surgical history, allergies, medications, social and family history as documented unless otherwise noted below. The Family history, social history, and ROS are effectively unchanged since admission unless noted elsewhere in the chart. The patient is a 63-year-old female who was admitted for IV antibiotics after she had a fellon to her left ring finger incised and drained in the emergency department. She also has ecchymosis to her left great toe and foot. She was evaluated by Hand surgery and Podiatry who have both signed off as there is no indication for any surgical intervention. Infectious Disease has been managing the patient's antibiotics and we will see if we can transition her to oral antibiotics. Will continue to address her pain. The patient states she is improved today.        Nada Merlin DO  Attending Emergency Physician

## 2021-02-26 LAB — INTERVENTION: NORMAL

## 2021-02-27 LAB
CULTURE: ABNORMAL
DIRECT EXAM: ABNORMAL
Lab: ABNORMAL
SPECIMEN DESCRIPTION: ABNORMAL

## 2021-03-01 ENCOUNTER — OFFICE VISIT (OUTPATIENT)
Dept: INFECTIOUS DISEASES | Age: 56
End: 2021-03-01
Payer: COMMERCIAL

## 2021-03-01 VITALS
SYSTOLIC BLOOD PRESSURE: 110 MMHG | BODY MASS INDEX: 40.18 KG/M2 | TEMPERATURE: 97.7 F | DIASTOLIC BLOOD PRESSURE: 66 MMHG | HEART RATE: 99 BPM | WEIGHT: 250 LBS | HEIGHT: 66 IN

## 2021-03-01 DIAGNOSIS — L02.511 FINGER PULP ABSCESS, RIGHT: Primary | ICD-10-CM

## 2021-03-01 DIAGNOSIS — N76.0 ACUTE VAGINITIS: ICD-10-CM

## 2021-03-01 DIAGNOSIS — L02.519 PULP ABSCESS OF FINGER, UNSPECIFIED LATERALITY: Primary | ICD-10-CM

## 2021-03-01 PROCEDURE — 99213 OFFICE O/P EST LOW 20 MIN: CPT | Performed by: INTERNAL MEDICINE

## 2021-03-01 PROCEDURE — G8484 FLU IMMUNIZE NO ADMIN: HCPCS | Performed by: INTERNAL MEDICINE

## 2021-03-01 PROCEDURE — 1036F TOBACCO NON-USER: CPT | Performed by: INTERNAL MEDICINE

## 2021-03-01 PROCEDURE — G8427 DOCREV CUR MEDS BY ELIG CLIN: HCPCS | Performed by: INTERNAL MEDICINE

## 2021-03-01 PROCEDURE — G8417 CALC BMI ABV UP PARAM F/U: HCPCS | Performed by: INTERNAL MEDICINE

## 2021-03-01 PROCEDURE — 3017F COLORECTAL CA SCREEN DOC REV: CPT | Performed by: INTERNAL MEDICINE

## 2021-03-01 PROCEDURE — 1111F DSCHRG MED/CURRENT MED MERGE: CPT | Performed by: INTERNAL MEDICINE

## 2021-03-01 RX ORDER — METRONIDAZOLE 500 MG/1
500 TABLET ORAL 3 TIMES DAILY
Qty: 42 TABLET | Refills: 0 | Status: ON HOLD | OUTPATIENT
Start: 2021-03-01 | End: 2021-03-15 | Stop reason: HOSPADM

## 2021-03-01 RX ORDER — FLUCONAZOLE 150 MG/1
150 TABLET ORAL DAILY
Qty: 1 TABLET | Refills: 0 | Status: SHIPPED | OUTPATIENT
Start: 2021-03-01 | End: 2021-03-03

## 2021-03-01 RX ORDER — DOXYCYCLINE HYCLATE 100 MG
100 TABLET ORAL 2 TIMES DAILY
Qty: 28 TABLET | Refills: 0 | Status: ON HOLD | OUTPATIENT
Start: 2021-03-01 | End: 2021-03-15 | Stop reason: HOSPADM

## 2021-03-01 RX ORDER — METRONIDAZOLE 500 MG/1
500 TABLET ORAL 3 TIMES DAILY
Qty: 30 TABLET | Refills: 0 | Status: ON HOLD | OUTPATIENT
Start: 2021-03-01 | End: 2021-03-15 | Stop reason: HOSPADM

## 2021-03-01 RX ORDER — CEPHALEXIN 500 MG/1
500 CAPSULE ORAL 4 TIMES DAILY
Qty: 56 CAPSULE | Refills: 0 | Status: ON HOLD | OUTPATIENT
Start: 2021-03-01 | End: 2021-03-15 | Stop reason: HOSPADM

## 2021-03-01 ASSESSMENT — ENCOUNTER SYMPTOMS
ABDOMINAL DISTENTION: 0
EYE DISCHARGE: 0
COLOR CHANGE: 1
EYE ITCHING: 0

## 2021-03-01 NOTE — PROGRESS NOTES
[facility] - Initial Consult Note  Today's Date: 3/1/2021    Impression :   · R 4th finger abscess - MRSA strep and anaerobes   · On doxy keflex since 2/25 and adding flagyl-  · Finger betetr and scabbed but pain radiates now to the shoulder wo infection spreading out - finger tip very dry and suspect an abscess in the area -      Recommendations   Need plastic to eval post STV asap in clinic for the pain - and keep same AB - doxy flagyl and keflex  add fluc for vaginits -   see me in 2 weeks     Diagnosis Orders   1. Finger pulp abscess, right  fluconazole (DIFLUCAN) 150 MG tablet    metroNIDAZOLE (FLAGYL) 500 MG tablet    doxycycline hyclate (VIBRA-TABS) 100 MG tablet    cephALEXin (KEFLEX) 500 MG capsule   2. Acute vaginitis  fluconazole (DIFLUCAN) 150 MG tablet    metroNIDAZOLE (FLAGYL) 500 MG tablet    doxycycline hyclate (VIBRA-TABS) 100 MG tablet    cephALEXin (KEFLEX) 500 MG capsule       Return in about 2 weeks (around 3/15/2021). History of Present Illness:   Marlys Samano is a 50y.o.-year-old  female who presents with   Chief Complaint   Patient presents with    Follow-Up from [facility] V's finger abscess     post STV  2/23/21  · left ring finger abscess    plan  · Stop vanco ceftriaxone  · Drop to keflex doxy x 10 days  · Okay to discharge the patient otherwise with above,  · I asked the lab to call me with final results of the culture and will call her to adjust as needed              Visit 3/1/21  Pain left finger and up the shoulder - finger tip dry scab but no pus and no cellulitis- might need I/D and FU w plastics - exam neg - pain +++  cx w MRSA strep and anaerob GNR  Pt on doxy keflex -  Flagyl added and did not take it yet - she is in NH  Exam neg    I have personally reviewed the past medical history, past surgical history, medications, social history, and family history, and I haveupdated the database accordingly.   Past Medical History: Past Medical History:   Diagnosis Date    Acid reflux 5/29/2017    Acute cystitis with hematuria 10/11/2016    Acute non-recurrent maxillary sinusitis 11/28/2017    Asthma     Bipolar 1 disorder ([address] 1/4/2018    Bipolar disorder, mixed ([address]     BMI 34.0-34.9,adult 11/28/2017    Cannabis use disorder, severe, dependence ([address] 12/19/2017    Cerebrovascular accident (CVA) ([address] 6/14/2017    Chest pain 11/5/2016    Chronic renal insufficiency     Cocaine abuse ([address] 1/5/2018    DDD (degenerative disc disease), cervical     Diabetes mellitus ([address]     Dizziness 6/13/2017    Fibromyalgia     History of stroke 9/9/2017    Homicidal ideation 11/6/2017    Hyperglycemia     Hypertension     Hypotension 1/18/2019    IDDM (insulin dependent diabetes mellitus) 12/21/2015    Lupus ([address]     Migraine     Neuropathy     Neuropathy     Polysubstance abuse ([address] 9/17/2017    Post traumatic stress disorder (PTSD)     Posttraumatic stress disorder 5/29/2017    Recurrent depression ([address] 5/29/2017    Recurrent major depressive disorder, in partial remission ([address] 11/28/2017    Screening mammogram, encounter for 11/28/2017    Severe recurrent major depression with psychotic features ([address] 12/19/2017    Severe recurrent major depression without psychotic features ([address] 12/19/2017    Stroke (cerebrum) ([address] 6/14/2017    Stroke ([address]     per chart notes    Suicidal ideation     Suicidal intent 3/10/2017    Vitamin D deficiency 11/28/2017    White matter changes 6/13/2017       Past Surgical  History:     Past Surgical History:   Procedure Laterality Date    ABDOMEN SURGERY      drain tube    ABSCESS DRAINAGE      right buttock    CATARACT REMOVAL WITH IMPLANT Bilateral     CHEST TUBE INSERTION      LASIK Bilateral        Medications:     Current Outpatient Medications:     metroNIDAZOLE (FLAGYL) 500 MG tablet, Take 1 tablet by mouth 3 times daily for 10 days, Disp: 30 tablet, Rfl: 0   fluconazole (DIFLUCAN) 150 MG tablet, Take 1 tablet by mouth daily for 2 doses, Disp: 1 tablet, Rfl: 0    metroNIDAZOLE (FLAGYL) 500 MG tablet, Take 1 tablet by mouth 3 times daily for 14 days, Disp: 42 tablet, Rfl: 0    doxycycline hyclate (VIBRA-TABS) 100 MG tablet, Take 1 tablet by mouth 2 times daily for 14 days, Disp: 28 tablet, Rfl: 0    cephALEXin (KEFLEX) 500 MG capsule, Take 1 capsule by mouth 4 times daily for 14 days, Disp: 56 capsule, Rfl: 0    cephALEXin (KEFLEX) 500 MG capsule, Take 1 capsule by mouth 3 times daily for 10 days, Disp: 30 capsule, Rfl: 0    doxycycline hyclate (VIBRA-TABS) 100 MG tablet, Take 1 tablet by mouth every 12 hours for 10 days, Disp: 20 tablet, Rfl: 0    neomycin-bacitracin-polymyxin (NEOSPORIN) 400-5-5000 ointment, Apply topically 2 times daily. , Disp: 1 Tube, Rfl: 0    venlafaxine (EFFEXOR XR) 150 MG extended release capsule, Take 1 capsule by mouth daily (with breakfast), Disp: 30 capsule, Rfl: 3    insulin glargine (LANTUS SOLOSTAR) 100 UNIT/ML injection pen, Inject 15 Units into the skin nightly, Disp: 5 pen, Rfl: 5    Misc.  Devices MISC, 1 pair of dm shoes, 3 accommodated inserts, Disp: 1 Device, Rfl: 0    dicyclomine (BENTYL) 20 MG tablet, Take 1 tablet by mouth 3 times daily as needed (for abdominal discomfort), Disp: 60 tablet, Rfl: 0    cetirizine (ZYRTEC) 10 MG tablet, Take 1 tablet by mouth daily, Disp: 90 tablet, Rfl: 1    Insulin Pen Needle ([employer] PEN NEEDLES 31G) 31G X 8 MM MISC, 1 each by Does not apply route daily, Disp: 100 each, Rfl: 3    ARIPiprazole (ABILIFY) 10 MG tablet, Take 1 tablet by mouth daily, Disp: 30 tablet, Rfl: 3     MG capsule, TAKE 1 CAPSULE BY MOUTH TWICE DAILY, Disp: 60 capsule, Rfl: 3    acetaminophen (TYLENOL) 325 MG tablet, TAKE 2 TABLETS BY MOUTH EVERY 6 HOURS AS NEEDED FOR PAIN, Disp: 30 tablet, Rfl: 5    metFORMIN (GLUCOPHAGE) 1000 MG tablet, TAKE 1 TABLET BY MOUTH DAILY WITH BREAKFAST, Disp: 60 tablet, Rfl: 3    traZODone (DESYREL) 150 MG tablet, Take 1 tablet by mouth nightly, Disp: 14 tablet, Rfl: 5    omeprazole (PRILOSEC) 20 MG delayed release capsule, Take 1 capsule by mouth Daily, Disp: 30 capsule, Rfl: 3    FREESTYLE LITE strip, 1 each by Does not apply route 3 times daily, Disp: 100 each, Rfl: 5    FreeStyle Lancets MISC, 1 each by Does not apply route 3 times daily, Disp: 100 each, Rfl: 5    Alcohol Swabs (ALCOHOL PREP) 70 % PADS, 1 each by Does not apply route as needed (use as needed) Use_____times per day Diagnosis: 250.0   Diabetes ___Insulin-Dependent___Non-Insulin Dependent, Disp: 100 each, Rfl: 11    FLUoxetine (PROZAC) 10 MG capsule, Take 60 mg by mouth daily, Disp: , Rfl:     benzonatate (TESSALON) 200 MG capsule, , Disp: , Rfl:     celecoxib (CELEBREX) 200 MG capsule, , Disp: , Rfl:     FLOVENT  MCG/ACT inhaler, Inhale 2 puffs into the lungs 2 times daily, Disp: 1 Inhaler, Rfl: 3    SITagliptin (JANUVIA) 100 MG tablet, Take 1 tablet by mouth daily, Disp: 30 tablet, Rfl: 3    budesonide-formoterol (SYMBICORT) 80-4.5 MCG/ACT AERO, Inhale 2 puffs into the lungs 2 times daily, Disp: 1 Inhaler, Rfl: 5    melatonin (RA MELATONIN) 3 MG TABS tablet, Take 1 tablet by mouth daily, Disp: 30 tablet, Rfl: 3    gabapentin (NEURONTIN) 300 MG capsule, TAKE 3 CAPSULES 900 MG BY MOUTH THREE TIMES DAILY, Disp: 90 capsule, Rfl: 2    albuterol sulfate  (90 Base) MCG/ACT inhaler, Inhale 2 puffs into the lungs every 4 hours as needed for Wheezing, Disp: 1 Inhaler, Rfl: 5      Social History:     Social History     Socioeconomic History    Marital status: Legally      Spouse name: Not on file    Number of children: Not on file    Years of education: Not on file    Highest education level: Not on file   Occupational History    Not on file   Social Needs    Financial resource strain: Not on file    Food insecurity     Worry: Never true     Inability: Never true   [employer] needs     Medical: No     Non-medical: No   Tobacco Use    Smoking status: Never Smoker    Smokeless tobacco: Never Used   Substance and Sexual Activity    Alcohol use: Not Currently    Drug use: Not Currently     Types: Marijuana, Cocaine    Sexual activity: Not Currently     Partners: Male   Lifestyle    Physical activity     Days per week: Not on file     Minutes per session: Not on file    Stress: Not on file   Relationships    Social connections     Talks on phone: Not on file     Gets together: Not on file     Attends Sabianist service: Not on file     Active member of club or organization: Not on file     Attends meetings of clubs or organizations: Not on file     Relationship status: Not on file    Intimate partner violence     Fear of current or ex partner: Not on file     Emotionally abused: Not on file     Physically abused: Not on file     Forced sexual activity: Not on file   Other Topics Concern    Not on file   Social History Narrative    Not on file       Family History:     Family History   Problem Relation Age of Onset    Diabetes Mother     Stroke Mother     Diabetes Father     Diabetes Sister     Diabetes Brother     Other Son         GSW    No Known Problems Sister         Allergies:   Bactrim [sulfamethoxazole-trimethoprim] and Adhesive tape     Review of Systems:   Review of Systems   Constitutional: Negative for activity change and appetite change. HENT: Negative for congestion. Eyes: Negative for discharge and itching. Cardiovascular: Negative for chest pain. Gastrointestinal: Negative for abdominal distention. Endocrine: Negative for heat intolerance. Genitourinary: Negative for dysuria. Musculoskeletal: Negative for arthralgias. Skin: Positive for color change and wound. Allergic/Immunologic: Negative for immunocompromised state. Neurological: Negative for dizziness. Hematological: Negative for adenopathy.    Psychiatric/Behavioral: Negative for agitation. Physical Examination :   Blood pressure 110/66, pulse 99, temperature 97.7 °F (36.5 °C), temperature source Temporal, height 5' 6\" (1.676 m), weight 250 lb (113.4 kg). Physical Exam  Constitutional:       Appearance: Normal appearance. She is not ill-appearing. HENT:      Head: Normocephalic and atraumatic. Nose: Nose normal.      Mouth/Throat:      Pharynx: Oropharynx is clear. Eyes:      General: No scleral icterus. Conjunctiva/sclera: Conjunctivae normal.   Neck:      Musculoskeletal: Neck supple. No neck rigidity. Cardiovascular:      Rate and Rhythm: Normal rate and regular rhythm. Heart sounds: Normal heart sounds. No murmur. Pulmonary:      Effort: No respiratory distress. Breath sounds: Normal breath sounds. No wheezing. Abdominal:      General: There is no distension. Palpations: Abdomen is soft. Tenderness: There is no abdominal tenderness. Genitourinary:     Comments: No cullen  Musculoskeletal:         General: No swelling or deformity. Skin:     General: Skin is dry. Coloration: Skin is not jaundiced. Neurological:      General: No focal deficit present. Mental Status: She is alert and oriented to person, place, and time. Cranial Nerves: No cranial nerve deficit. Psychiatric:         Mood and Affect: Mood normal.         Thought Content:  Thought content normal.           Medical Decision Making:   I have independently reviewed/ordered the following labs:    CBCwith Differential:   Lab Results   Component Value Date    WBC 7.3 02/23/2021    WBC 7.6 02/18/2021    HGB 9.5 02/23/2021    HGB 9.3 02/18/2021    HCT 31.8 02/23/2021    HCT 31.1 02/18/2021     02/23/2021     02/18/2021    LYMPHOPCT 13 02/23/2021    LYMPHOPCT 22 02/07/2021    MONOPCT 4 02/23/2021    MONOPCT 10 02/07/2021     BMP:  Lab Results   Component Value Date     02/25/2021     02/23/2021    K 4.2 02/25/2021    K 5.0 02/23/2021    CL 98 02/25/2021    CL 97 02/23/2021    CO2 25 02/25/2021    CO2 28 02/23/2021    BUN 21 02/25/2021    BUN 21 02/23/2021    CREATININE 0.91 02/25/2021    CREATININE 0.87 02/23/2021    MG 1.8 11/26/2019    MG 1.8 06/23/2016     Hepatic Function Panel:   Lab Results   Component Value Date    PROT 8.1 02/07/2021    PROT 6.7 10/30/2020    LABALBU 3.8 02/07/2021    LABALBU 3.8 10/30/2020    BILITOT 0.30 02/07/2021    BILITOT <0.10 10/30/2020    ALKPHOS 144 02/07/2021    ALKPHOS 72 10/30/2020    ALT 17 02/07/2021    ALT 13 10/30/2020    AST 13 02/07/2021    AST 15 10/30/2020     No results found for: RPR  No results found for: HIV  No results found for: [facility]  Lab Results   Component Value Date    MUCUS 2+ 09/05/2017    RBC 3.00 02/23/2021    TRICHOMONAS NOT REPORTED 09/05/2017    WBC 7.3 02/23/2021    YEAST NOT REPORTED 09/05/2017    TURBIDITY CLEAR 02/07/2021     Lab Results   Component Value Date    CREATININE 0.91 02/25/2021    GLUCOSE 112 02/25/2021   you for allowing us to participate in the care of this patient. Please call with questions. Omid Garcia MD  - Office: [phone]    Please note that this chart was generated using voice recognition Dragon dictation software. Although every effort was made to ensure the accuracy of this automated transcription, some errors in transcription mayhave occurred.

## 2021-03-03 ENCOUNTER — TELEPHONE (OUTPATIENT)
Dept: FAMILY MEDICINE CLINIC | Age: 56
End: 2021-03-03

## 2021-03-03 NOTE — TELEPHONE ENCOUNTER
Telephone Outcome: Patient in nursing home. Spoke with patient to follow up and see how she is doing at the SNF [facility]). States she is slowly doing better is able to come to a standing position but still cannot walk. Recently has gone to infectious disease for what they think is frost bite on her 4th right finger and vaginitis. States she is going to have the nursing home call and schedule a follow up appointment here.

## 2021-03-11 ENCOUNTER — APPOINTMENT (OUTPATIENT)
Dept: GENERAL RADIOLOGY | Age: 56
DRG: 952 | End: 2021-03-11
Payer: COMMERCIAL

## 2021-03-11 ENCOUNTER — HOSPITAL ENCOUNTER (INPATIENT)
Age: 56
LOS: 3 days | Discharge: NURSING, REHAB OR LONG-TERM CARE | DRG: 952 | End: 2021-03-15
Attending: EMERGENCY MEDICINE | Admitting: EMERGENCY MEDICINE
Payer: COMMERCIAL

## 2021-03-11 DIAGNOSIS — M86.142 OTHER ACUTE OSTEOMYELITIS OF LEFT HAND (HCC): Primary | ICD-10-CM

## 2021-03-11 DIAGNOSIS — M86.249: ICD-10-CM

## 2021-03-11 PROBLEM — M86.9 OSTEOMYELITIS (HCC): Status: ACTIVE | Noted: 2021-03-11

## 2021-03-11 LAB
ABSOLUTE EOS #: 0.22 K/UL (ref 0–0.44)
ABSOLUTE IMMATURE GRANULOCYTE: <0.03 K/UL (ref 0–0.3)
ABSOLUTE LYMPH #: 1.99 K/UL (ref 1.1–3.7)
ABSOLUTE MONO #: 0.42 K/UL (ref 0.1–1.2)
ANION GAP SERPL CALCULATED.3IONS-SCNC: 13 MMOL/L (ref 9–17)
BASOPHILS # BLD: 1 % (ref 0–2)
BASOPHILS ABSOLUTE: 0.03 K/UL (ref 0–0.2)
BUN BLDV-MCNC: 12 MG/DL (ref 6–20)
BUN/CREAT BLD: ABNORMAL (ref 9–20)
C-REACTIVE PROTEIN: 6.1 MG/L (ref 0–5)
CALCIUM SERPL-MCNC: 9.1 MG/DL (ref 8.6–10.4)
CHLORIDE BLD-SCNC: 103 MMOL/L (ref 98–107)
CO2: 24 MMOL/L (ref 20–31)
CREAT SERPL-MCNC: 0.73 MG/DL (ref 0.5–0.9)
DIFFERENTIAL TYPE: ABNORMAL
EOSINOPHILS RELATIVE PERCENT: 4 % (ref 1–4)
GFR AFRICAN AMERICAN: >60 ML/MIN
GFR NON-AFRICAN AMERICAN: >60 ML/MIN
GFR SERPL CREATININE-BSD FRML MDRD: ABNORMAL ML/MIN/{1.73_M2}
GFR SERPL CREATININE-BSD FRML MDRD: ABNORMAL ML/MIN/{1.73_M2}
GLUCOSE BLD-MCNC: 102 MG/DL (ref 70–99)
GLUCOSE BLD-MCNC: 208 MG/DL (ref 65–105)
HCT VFR BLD CALC: 31.9 % (ref 36.3–47.1)
HEMOGLOBIN: 9.7 G/DL (ref 11.9–15.1)
IMMATURE GRANULOCYTES: 0 %
LYMPHOCYTES # BLD: 38 % (ref 24–43)
MCH RBC QN AUTO: 31.2 PG (ref 25.2–33.5)
MCHC RBC AUTO-ENTMCNC: 30.4 G/DL (ref 28.4–34.8)
MCV RBC AUTO: 102.6 FL (ref 82.6–102.9)
MONOCYTES # BLD: 8 % (ref 3–12)
NRBC AUTOMATED: 0 PER 100 WBC
PDW BLD-RTO: 14.1 % (ref 11.8–14.4)
PLATELET # BLD: ABNORMAL K/UL (ref 138–453)
PLATELET ESTIMATE: ABNORMAL
PLATELET, FLUORESCENCE: 341 K/UL (ref 138–453)
PLATELET, IMMATURE FRACTION: 2.2 % (ref 1.1–10.3)
PMV BLD AUTO: ABNORMAL FL (ref 8.1–13.5)
POTASSIUM SERPL-SCNC: 4.2 MMOL/L (ref 3.7–5.3)
RBC # BLD: 3.11 M/UL (ref 3.95–5.11)
RBC # BLD: ABNORMAL 10*6/UL
SEDIMENTATION RATE, ERYTHROCYTE: 102 MM (ref 0–30)
SEG NEUTROPHILS: 50 % (ref 36–65)
SEGMENTED NEUTROPHILS ABSOLUTE COUNT: 2.64 K/UL (ref 1.5–8.1)
SODIUM BLD-SCNC: 140 MMOL/L (ref 135–144)
WBC # BLD: 5.3 K/UL (ref 3.5–11.3)
WBC # BLD: ABNORMAL 10*3/UL

## 2021-03-11 PROCEDURE — 85055 RETICULATED PLATELET ASSAY: CPT

## 2021-03-11 PROCEDURE — 73130 X-RAY EXAM OF HAND: CPT

## 2021-03-11 PROCEDURE — G0378 HOSPITAL OBSERVATION PER HR: HCPCS

## 2021-03-11 PROCEDURE — 86140 C-REACTIVE PROTEIN: CPT

## 2021-03-11 PROCEDURE — 2580000003 HC RX 258: Performed by: STUDENT IN AN ORGANIZED HEALTH CARE EDUCATION/TRAINING PROGRAM

## 2021-03-11 PROCEDURE — 6370000000 HC RX 637 (ALT 250 FOR IP): Performed by: STUDENT IN AN ORGANIZED HEALTH CARE EDUCATION/TRAINING PROGRAM

## 2021-03-11 PROCEDURE — 85652 RBC SED RATE AUTOMATED: CPT

## 2021-03-11 PROCEDURE — 82947 ASSAY GLUCOSE BLOOD QUANT: CPT

## 2021-03-11 PROCEDURE — 6360000002 HC RX W HCPCS: Performed by: STUDENT IN AN ORGANIZED HEALTH CARE EDUCATION/TRAINING PROGRAM

## 2021-03-11 PROCEDURE — 96366 THER/PROPH/DIAG IV INF ADDON: CPT

## 2021-03-11 PROCEDURE — 99285 EMERGENCY DEPT VISIT HI MDM: CPT

## 2021-03-11 PROCEDURE — 96365 THER/PROPH/DIAG IV INF INIT: CPT

## 2021-03-11 PROCEDURE — 85025 COMPLETE CBC W/AUTO DIFF WBC: CPT

## 2021-03-11 PROCEDURE — 80048 BASIC METABOLIC PNL TOTAL CA: CPT

## 2021-03-11 RX ORDER — ARIPIPRAZOLE 10 MG/1
10 TABLET ORAL DAILY
Status: DISCONTINUED | OUTPATIENT
Start: 2021-03-12 | End: 2021-03-15 | Stop reason: HOSPADM

## 2021-03-11 RX ORDER — INSULIN GLARGINE 100 [IU]/ML
15 INJECTION, SOLUTION SUBCUTANEOUS NIGHTLY
Status: DISCONTINUED | OUTPATIENT
Start: 2021-03-11 | End: 2021-03-15 | Stop reason: HOSPADM

## 2021-03-11 RX ORDER — DOCUSATE SODIUM 100 MG/1
100 CAPSULE, LIQUID FILLED ORAL 2 TIMES DAILY
Status: DISCONTINUED | OUTPATIENT
Start: 2021-03-11 | End: 2021-03-15 | Stop reason: HOSPADM

## 2021-03-11 RX ORDER — METRONIDAZOLE 500 MG/1
500 TABLET ORAL EVERY 8 HOURS SCHEDULED
Status: DISCONTINUED | OUTPATIENT
Start: 2021-03-11 | End: 2021-03-15

## 2021-03-11 RX ORDER — PANTOPRAZOLE SODIUM 40 MG/1
40 TABLET, DELAYED RELEASE ORAL
Status: DISCONTINUED | OUTPATIENT
Start: 2021-03-12 | End: 2021-03-15 | Stop reason: HOSPADM

## 2021-03-11 RX ORDER — SODIUM CHLORIDE 0.9 % (FLUSH) 0.9 %
10 SYRINGE (ML) INJECTION PRN
Status: DISCONTINUED | OUTPATIENT
Start: 2021-03-11 | End: 2021-03-15 | Stop reason: HOSPADM

## 2021-03-11 RX ORDER — VENLAFAXINE HYDROCHLORIDE 150 MG/1
150 CAPSULE, EXTENDED RELEASE ORAL
Status: DISCONTINUED | OUTPATIENT
Start: 2021-03-12 | End: 2021-03-15 | Stop reason: HOSPADM

## 2021-03-11 RX ORDER — BUDESONIDE AND FORMOTEROL FUMARATE DIHYDRATE 80; 4.5 UG/1; UG/1
2 AEROSOL RESPIRATORY (INHALATION) 2 TIMES DAILY
Status: DISCONTINUED | OUTPATIENT
Start: 2021-03-11 | End: 2021-03-15 | Stop reason: HOSPADM

## 2021-03-11 RX ORDER — FLUOXETINE HYDROCHLORIDE 20 MG/1
60 CAPSULE ORAL DAILY
Status: DISCONTINUED | OUTPATIENT
Start: 2021-03-12 | End: 2021-03-15 | Stop reason: HOSPADM

## 2021-03-11 RX ORDER — IBUPROFEN 400 MG/1
400 TABLET ORAL
Status: DISCONTINUED | OUTPATIENT
Start: 2021-03-11 | End: 2021-03-15 | Stop reason: HOSPADM

## 2021-03-11 RX ORDER — OXYCODONE HYDROCHLORIDE 5 MG/1
5 TABLET ORAL EVERY 6 HOURS PRN
Status: DISCONTINUED | OUTPATIENT
Start: 2021-03-11 | End: 2021-03-12

## 2021-03-11 RX ORDER — LANOLIN ALCOHOL/MO/W.PET/CERES
3 CREAM (GRAM) TOPICAL NIGHTLY PRN
Status: DISCONTINUED | OUTPATIENT
Start: 2021-03-11 | End: 2021-03-15 | Stop reason: HOSPADM

## 2021-03-11 RX ORDER — OXYCODONE HYDROCHLORIDE 5 MG/1
5 TABLET ORAL ONCE
Status: COMPLETED | OUTPATIENT
Start: 2021-03-11 | End: 2021-03-11

## 2021-03-11 RX ORDER — ACETAMINOPHEN 500 MG
1000 TABLET ORAL ONCE
Status: COMPLETED | OUTPATIENT
Start: 2021-03-11 | End: 2021-03-11

## 2021-03-11 RX ORDER — ACETAMINOPHEN 500 MG
1000 TABLET ORAL EVERY 8 HOURS SCHEDULED
Status: DISCONTINUED | OUTPATIENT
Start: 2021-03-11 | End: 2021-03-15 | Stop reason: HOSPADM

## 2021-03-11 RX ORDER — SODIUM CHLORIDE 0.9 % (FLUSH) 0.9 %
10 SYRINGE (ML) INJECTION EVERY 12 HOURS SCHEDULED
Status: DISCONTINUED | OUTPATIENT
Start: 2021-03-11 | End: 2021-03-15 | Stop reason: HOSPADM

## 2021-03-11 RX ORDER — SODIUM CHLORIDE 9 MG/ML
INJECTION, SOLUTION INTRAVENOUS CONTINUOUS
Status: DISCONTINUED | OUTPATIENT
Start: 2021-03-11 | End: 2021-03-14

## 2021-03-11 RX ORDER — ALOGLIPTIN 12.5 MG/1
25 TABLET, FILM COATED ORAL DAILY
Status: DISCONTINUED | OUTPATIENT
Start: 2021-03-12 | End: 2021-03-15 | Stop reason: HOSPADM

## 2021-03-11 RX ADMIN — METRONIDAZOLE 500 MG: 500 TABLET ORAL at 21:03

## 2021-03-11 RX ADMIN — SODIUM CHLORIDE: 9 INJECTION, SOLUTION INTRAVENOUS at 23:05

## 2021-03-11 RX ADMIN — TRAZODONE HYDROCHLORIDE 150 MG: 100 TABLET ORAL at 23:11

## 2021-03-11 RX ADMIN — OXYCODONE HYDROCHLORIDE 5 MG: 5 TABLET ORAL at 17:19

## 2021-03-11 RX ADMIN — INSULIN GLARGINE 15 UNITS: 100 INJECTION, SOLUTION SUBCUTANEOUS at 23:17

## 2021-03-11 RX ADMIN — OXYCODONE HYDROCHLORIDE 5 MG: 5 TABLET ORAL at 23:14

## 2021-03-11 RX ADMIN — IBUPROFEN 400 MG: 400 TABLET, FILM COATED ORAL at 23:12

## 2021-03-11 RX ADMIN — CEFTAROLINE FOSAMIL 600 MG: 600 POWDER, FOR SOLUTION INTRAVENOUS at 20:24

## 2021-03-11 RX ADMIN — DOCUSATE SODIUM 100 MG: 100 CAPSULE, LIQUID FILLED ORAL at 23:12

## 2021-03-11 RX ADMIN — SODIUM CHLORIDE, PRESERVATIVE FREE 10 ML: 5 INJECTION INTRAVENOUS at 23:07

## 2021-03-11 RX ADMIN — ACETAMINOPHEN 1000 MG: 500 TABLET ORAL at 17:19

## 2021-03-11 RX ADMIN — Medication 3 MG: at 23:12

## 2021-03-11 ASSESSMENT — ENCOUNTER SYMPTOMS
EYE DISCHARGE: 0
ABDOMINAL PAIN: 0
APNEA: 0
DIARRHEA: 0
TROUBLE SWALLOWING: 0
SHORTNESS OF BREATH: 0
COLOR CHANGE: 1
ABDOMINAL DISTENTION: 0
SORE THROAT: 0
VOMITING: 0
CONSTIPATION: 0
NAUSEA: 0

## 2021-03-11 ASSESSMENT — PAIN SCALES - GENERAL
PAINLEVEL_OUTOF10: 10
PAINLEVEL_OUTOF10: 10

## 2021-03-11 ASSESSMENT — PAIN DESCRIPTION - DESCRIPTORS
DESCRIPTORS: ACHING
DESCRIPTORS: ACHING;THROBBING

## 2021-03-11 ASSESSMENT — PAIN DESCRIPTION - FREQUENCY: FREQUENCY: CONTINUOUS

## 2021-03-11 ASSESSMENT — PAIN DESCRIPTION - PAIN TYPE: TYPE: ACUTE PAIN

## 2021-03-11 ASSESSMENT — PAIN DESCRIPTION - LOCATION: LOCATION: FINGER (COMMENT WHICH ONE)

## 2021-03-11 NOTE — ED PROVIDER NOTES
[facility]     Emergency Department     Faculty Attestation    I performed a history and physical examination of the patient and discussed management with the resident. I have reviewed and agree with the residents findings including all diagnostic interpretations, and treatment plans as written. Any areas of disagreement are noted on the chart. I was personally present for the key portions of any procedures. I have documented in the chart those procedures where I was not present during the key portions. I have reviewed the emergency nurses triage note. I agree with the chief complaint, past medical history, past surgical history, allergies, medications, social and family history as documented unless otherwise noted below. Documentation of the HPI, Physical Exam and Medical Decision Making performed by scribjesika is based on my personal performance of the HPI, PE and MDM. For Physician Assistant/ Nurse Practitioner cases/documentation I have personally evaluated this patient and have completed at least one if not all key elements of the E/M (history, physical exam, and MDM). Additional findings are as noted. Patient with continued left ring finger pain, has felon with drainage on 3/01 saw infectious disease and hand surgery. Started on doxy and keflex, and patient reports has completed, but continues to have pain and swelling. Came in for eval today, no fevers. Tip of left ring finger with open wound, with some swelling, and necrosis. Sensation intact, no kanavel signs.   Plan for xray imaging, pain control, hand v infectious disease consult                Ron Kay D.O, M.P.H  Attending Emergency Medicine Physician         Ron Kay,   03/11/21 [id number]

## 2021-03-11 NOTE — ED PROVIDER NOTES
[facility] [place]  Emergency Department Encounter  EmergencyMedicine Resident     Pt Name:Terri Manley  MRN: [id number]  [place] 1965  Date of evaluation: 3/11/21  PCP:  Cahrlee Hartman MD    [address] [place] [address]       Chief Complaint   Patient presents with    Finger Pain       HISTORY OF PRESENT ILLNESS  (Location/Symptom, Timing/Onset, Context/Setting, Quality, Duration, Modifying Factors, Severity.)      Sylvester Ruvalcaba is a 50 y.o. female who presents with persistent left fourth digit pain for last several weeks after felon drainage. Patient has been following with Dr. Hanh Hooks of infectious disease given her increased risk of infection with diabetes and lack of response to p.o. antibiotics. Last evaluated 10 days ago with Flagyl added to the antibiotic regimen at that time however patient is unsure if she started taking this antibiotic and does not believe she has continue taking her previously prescribed antibiotics. Not associated with fevers or chills, nausea vomiting, signs of infection spreading proximal from the distal tip however tissue is becoming more necrotic with decreased sensation. Significant pain unable to be managed with home medications.     PAST MEDICAL / SURGICAL / SOCIAL / FAMILY HISTORY      has a past medical history of Acid reflux, Acute cystitis with hematuria, Acute non-recurrent maxillary sinusitis, Asthma, Bipolar 1 disorder ([address] Bipolar disorder, mixed ([address] BMI 34.0-34.9,adult, Cannabis use disorder, severe, dependence ([address] Cerebrovascular accident (CVA) ([address] Chest pain, Chronic renal insufficiency, Cocaine abuse ([address] DDD (degenerative disc disease), cervical, Diabetes mellitus ([address] Dizziness, Fibromyalgia, History of stroke, Homicidal ideation, Hyperglycemia, Hypertension, Hypotension, IDDM (insulin dependent diabetes mellitus), Lupus ([address] Migraine, Neuropathy, Neuropathy, Polysubstance abuse ([address] Post traumatic stress disorder (PTSD), Posttraumatic stress disorder, Recurrent depression ([facility] Recurrent major depressive disorder, in partial remission ([facility] Screening mammogram, encounter for, Severe recurrent major depression with psychotic features ([facility] Severe recurrent major depression without psychotic features ([facility] Stroke (cerebrum) ([facility] Stroke ([facility] Suicidal ideation, Suicidal intent, Vitamin D deficiency, and White matter changes. has a past surgical history that includes Abscess Drainage; chest tube insertion; Abdomen surgery; Cataract removal with implant (Bilateral); and LASIK (Bilateral).     Social History     Socioeconomic History    Marital status: Legally      Spouse name: Not on file    Number of children: Not on file    Years of education: Not on file    Highest education level: Not on file   Occupational History    Not on file   Social Needs    Financial resource strain: Not on file    Food insecurity     Worry: Never true     Inability: Never true    Transportation needs     Medical: No     Non-medical: No   Tobacco Use    Smoking status: Never Smoker    Smokeless tobacco: Never Used   Substance and Sexual Activity    Alcohol use: Not Currently    Drug use: Not Currently     Types: Marijuana, Cocaine    Sexual activity: Not Currently     Partners: Male   Lifestyle    Physical activity     Days per week: Not on file     Minutes per session: Not on file    Stress: Not on file   Relationships    Social connections     Talks on phone: Not on file     Gets together: Not on file     Attends [employer] service: Not on file     Active member of club or organization: Not on file     Attends meetings of clubs or organizations: Not on file     Relationship status: Not on file    Intimate partner violence     Fear of current or ex partner: Not on file     Emotionally abused: Not on file     Physically abused: Not on file     Forced sexual activity: Not on file   Other Topics Concern    Not on file   Social History Narrative    Not on file       Family History   Problem Relation Age of Onset    Diabetes Mother     Stroke Mother     Diabetes Father     Diabetes Sister     Diabetes Brother     Other Son         GSW    No Known Problems Sister        Allergies:  Bactrim [sulfamethoxazole-trimethoprim] and Adhesive tape    Home Medications:  Prior to Admission medications    Medication Sig Start Date End Date Taking? Authorizing Provider   metroNIDAZOLE (FLAGYL) 500 MG tablet Take 1 tablet by mouth 3 times daily for 10 days 3/1/21 3/11/21  Maylin James MD   metroNIDAZOLE (FLAGYL) 500 MG tablet Take 1 tablet by mouth 3 times daily for 14 days 3/1/21 3/15/21  Lia Oneill MD   doxycycline hyclate (VIBRA-TABS) 100 MG tablet Take 1 tablet by mouth 2 times daily for 14 days 3/1/21 3/15/21  Lia Oneill MD   cephALEXin (KEFLEX) 500 MG capsule Take 1 capsule by mouth 4 times daily for 14 days 3/1/21 3/15/21  Lia Oneill MD   venlafaxine (EFFEXOR XR) 150 MG extended release capsule Take 1 capsule by mouth daily (with breakfast) 2/10/21   Saloni Castillo MD   insulin glargine (LANTUS SOLOSTAR) 100 UNIT/ML injection pen Inject 15 Units into the skin nightly 2/9/21   Saloni Castillo MD   Misc.  Devices MISC 1 pair of dm shoes, 3 accommodated inserts 1/22/21   Cinthia Balloon, DPM   dicyclomine (BENTYL) 20 MG tablet Take 1 tablet by mouth 3 times daily as needed (for abdominal discomfort) 1/13/21   Dewayne Reyes MD   cetirizine (ZYRTEC) 10 MG tablet Take 1 tablet by mouth daily 1/13/21   Dewayne Reyes MD   Insulin Pen Needle ([employer] PEN NEEDLES 31G) 31G X 8 MM MISC 1 each by Does not apply route daily 1/4/21   Dewayne Reyes MD   ARIPiprazole (ABILIFY) 10 MG tablet Take 1 tablet by mouth daily 12/11/20   Dewayne Reyes MD   gabapentin (NEURONTIN) 300 MG capsule TAKE 3 CAPSULES 900 MG BY MOUTH THREE TIMES DAILY 12/9/20 1/8/21  Puma Chavarria MD    MG capsule TAKE 1 CAPSULE BY MOUTH TWICE DAILY 12/9/20   Junette Curling, MD   acetaminophen (TYLENOL) 325 MG tablet TAKE 2 TABLETS BY MOUTH EVERY 6 HOURS AS NEEDED FOR PAIN 12/9/20   Junette Curling, MD   metFORMIN (GLUCOPHAGE) 1000 MG tablet TAKE 1 TABLET BY MOUTH DAILY WITH BREAKFAST 12/9/20   Junette Curling, MD   traZODone (DESYREL) 150 MG tablet Take 1 tablet by mouth nightly 11/18/20   Junette Curling, MD   omeprazole (PRILOSEC) 20 MG delayed release capsule Take 1 capsule by mouth Daily 11/18/20   Junette Curling, MD FREESTYLE LITE strip 1 each by Does not apply route 3 times daily 11/11/20   Junette Curling, MD FreeStyle Lancets MISC 1 each by Does not apply route 3 times daily 11/11/20   Junette Curling, MD   Alcohol Swabs (ALCOHOL PREP) 70 % PADS 1 each by Does not apply route as needed (use as needed) Use_____times per day  Diagnosis: 250.0   Diabetes ___Insulin-Dependent___Non-Insulin Dependent 11/11/20   Junette Curling, MD   FLUoxetine (PROZAC) 10 MG capsule Take 60 mg by mouth daily    Historical Provider, MD   benzonatate (TESSALON) 200 MG capsule  8/17/20   Historical Provider, MD   celecoxib (CELEBREX) 200 MG capsule  10/10/20   Historical Provider, MD   albuterol sulfate  (90 Base) MCG/ACT inhaler Inhale 2 puffs into the lungs every 4 hours as needed for Wheezing 10/20/20 11/20/20  Junette Curling, MD   FLOVENT  MCG/ACT inhaler Inhale 2 puffs into the lungs 2 times daily 10/20/20   Junette Curling, MD   SITagliptin (JANUVIA) 100 MG tablet Take 1 tablet by mouth daily 10/20/20   Junette Curling, MD   budesonide-formoterol (SYMBICORT) 80-4.5 MCG/ACT AERO Inhale 2 puffs into the lungs 2 times daily 10/20/20   Junette Curling, MD   melatonin (RA MELATONIN) 3 MG TABS tablet Take 1 tablet by mouth daily 10/20/20   Junette Curling, MD       REVIEW OF SYSTEMS    (2-9 systems for level 4, 10 or more for level 5)      Review of Systems   Constitutional: Negative for chills and fever.    HENT: Negative for sore throat and trouble swallowing. Respiratory: Negative for shortness of breath. Cardiovascular: Negative for chest pain. Gastrointestinal: Negative for abdominal pain, constipation, diarrhea, nausea and vomiting. Genitourinary: Negative for dysuria and vaginal discharge. Musculoskeletal: Positive for myalgias. Negative for arthralgias and joint swelling. Skin: Positive for wound. Neurological: Negative for light-headedness and headaches. Psychiatric/Behavioral: Negative for behavioral problems. PHYSICAL EXAM   (up to 7 for level 4, 8 or more for level 5)      INITIAL VITALS:   BP (!) 144/85   Pulse 94   Temp 97.5 °F (36.4 °C) (Oral)   Resp 18   Ht 5' 6\" (1.676 m)   Wt 250 lb (113.4 kg)   LMP  (LMP Unknown)   SpO2 96%   BMI 40.35 kg/m²     Physical Exam  Vitals signs and nursing note reviewed. Constitutional:       General: She is not in acute distress. Appearance: Normal appearance. She is well-developed. She is not diaphoretic. HENT:      Head: Normocephalic and atraumatic. Right Ear: External ear normal.      Left Ear: External ear normal.      Nose: Nose normal.      Mouth/Throat:      Mouth: Mucous membranes are moist.      Pharynx: Uvula midline. No oropharyngeal exudate. Eyes:      General:         Right eye: No discharge. Left eye: No discharge. Neck:      Musculoskeletal: Normal range of motion. Cardiovascular:      Rate and Rhythm: Normal rate and regular rhythm. Heart sounds: Normal heart sounds. No murmur. Pulmonary:      Effort: Pulmonary effort is normal. No respiratory distress. Abdominal:      Palpations: Abdomen is soft. Tenderness: There is no abdominal tenderness. Musculoskeletal:      Comments: Unhealing and necrotic felon drainage incision of the distal fat pad left fourth digit with necrotic tissue of the tip and exquisite pain surrounding. Skin:     General: Skin is warm and dry.       Capillary Refill: Capillary refill takes less than 2 seconds. Neurological:      General: No focal deficit present. Mental Status: She is alert and oriented to person, place, and time.    Psychiatric:         Mood and Affect: Mood normal.         Behavior: Behavior normal.         DIFFERENTIAL  DIAGNOSIS     PLAN (LABS / IMAGING / EKG):  Orders Placed This Encounter   Procedures    XR HAND LEFT (MIN 3 VIEWS)    MRI HAND LEFT W WO CONTRAST    CBC Auto Differential    Basic Metabolic Panel w/ Reflex to MG    C-REACTIVE PROTEIN    Sedimentation Rate    Immature Platelet Fraction    CBC WITH AUTO DIFFERENTIAL    Basic Metabolic Panel w/ Reflex to MG    Diet NPO, After Midnight    Vital signs per unit routine    Notify physician    Notify physician    Up as tolerated    Place intermittent pneumatic compression device    Telemetry Monitoring    Full Code    Inpatient consult to Infectious Diseases    POC Glucose Fingerstick    Place CT Compatible peripheral IV    PATIENT STATUS (FROM ED OR OR/PROCEDURAL) Observation       MEDICATIONS ORDERED:  Orders Placed This Encounter   Medications    metroNIDAZOLE (FLAGYL) tablet 500 mg     Order Specific Question:   Antimicrobial Indications     Answer:   Skin and Soft Tissue Infection    ceftaroline fosamil (TEFLARO) 600 mg in dextrose 5 % 50 mL IVPB     Order Specific Question:   Antimicrobial Indications     Answer:   Skin and Soft Tissue Infection    oxyCODONE (ROXICODONE) immediate release tablet 5 mg    acetaminophen (TYLENOL) tablet 1,000 mg    ARIPiprazole (ABILIFY) tablet 10 mg    budesonide-formoterol (SYMBICORT) 80-4.5 MCG/ACT inhaler 2 puff    docusate sodium (COLACE) capsule 100 mg    FLUoxetine (PROZAC) capsule 60 mg    insulin glargine (LANTUS) injection vial 15 Units    melatonin tablet 3 mg    metFORMIN (GLUCOPHAGE) tablet 1,000 mg    pantoprazole (PROTONIX) tablet 40 mg    alogliptin (NESINA) tablet 25 mg    traZODone (DESYREL) tablet 150 mg    venlafaxine (EFFEXOR XR) extended release capsule 150 mg    sodium chloride flush 0.9 % injection 10 mL    sodium chloride flush 0.9 % injection 10 mL    enoxaparin (LOVENOX) injection 40 mg    acetaminophen (TYLENOL) tablet 1,000 mg    0.9 % sodium chloride infusion    ibuprofen (ADVIL;MOTRIN) tablet 400 mg    oxyCODONE (ROXICODONE) immediate release tablet 5 mg       DDX: Cellulitis versus osteomyelitis versus poor wound healing versus other    DIAGNOSTIC RESULTS / EMERGENCY DEPARTMENT COURSE / MDM     LABS:  Results for orders placed or performed during the hospital encounter of 03/11/21   CBC Auto Differential   Result Value Ref Range    WBC 5.3 3.5 - 11.3 k/uL    RBC 3.11 (L) 3.95 - 5.11 m/uL    Hemoglobin 9.7 (L) 11.9 - 15.1 g/dL    Hematocrit 31.9 (L) 36.3 - 47.1 %    .6 82.6 - 102.9 fL    MCH 31.2 25.2 - 33.5 pg    MCHC 30.4 28.4 - 34.8 g/dL    RDW 14.1 11.8 - 14.4 %    Platelets See Reflexed IPF Result 138 - 453 k/uL    MPV NOT REPORTED 8.1 - 13.5 fL    NRBC Automated 0.0 0.0 per 100 WBC    Differential Type NOT REPORTED     WBC Morphology NOT REPORTED     RBC Morphology NOT REPORTED     Platelet Estimate NOT REPORTED     Seg Neutrophils 50 36 - 65 %    Lymphocytes 38 24 - 43 %    Monocytes 8 3 - 12 %    Eosinophils % 4 1 - 4 %    Basophils 1 0 - 2 %    Immature Granulocytes 0 0 %    Segs Absolute 2.64 1.50 - 8.10 k/uL    Absolute Lymph # 1.99 1.10 - 3.70 k/uL    Absolute Mono # 0.42 0.10 - 1.20 k/uL    Absolute Eos # 0.22 0.00 - 0.44 k/uL    Basophils Absolute 0.03 0.00 - 0.20 k/uL    Absolute Immature Granulocyte <0.03 0.00 - 0.30 k/uL   Basic Metabolic Panel w/ Reflex to MG   Result Value Ref Range    Glucose 102 (H) 70 - 99 mg/dL    BUN 12 6 - 20 mg/dL    CREATININE 0.73 0.50 - 0.90 mg/dL    Bun/Cre Ratio NOT REPORTED 9 - 20    Calcium 9.1 8.6 - 10.4 mg/dL    Sodium 140 135 - 144 mmol/L    Potassium 4.2 3.7 - 5.3 mmol/L    Chloride 103 98 - 107 mmol/L    CO2 24 20 - 31 mmol/L    Anion Gap 13 9 - 17 mmol/L    GFR Non-African American >60 >60 mL/min    GFR African American >60 >60 mL/min    GFR Comment          GFR Staging NOT REPORTED    C-REACTIVE PROTEIN   Result Value Ref Range    CRP 6.1 (H) 0.0 - 5.0 mg/L   Sedimentation Rate   Result Value Ref Range    Sed Rate 102 (H) 0 - 30 mm   Immature Platelet Fraction   Result Value Ref Range    Platelet, Immature Fraction 2.2 1.1 - 10.3 %    Platelet, Fluorescence 341 138 - 453 k/uL   POC Glucose Fingerstick   Result Value Ref Range    POC Glucose 208 (H) 65 - 105 mg/dL         RADIOLOGY:  Xr Hand Left (min 3 Views)    Result Date: 3/11/2021  EXAMINATION: THREE XRAY VIEWS OF THE LEFT HAND 3/11/2021 4:29 pm COMPARISON: Three-view study of the left hand from 02/23/2021 HISTORY: ORDERING SYSTEM PROVIDED HISTORY: 4th digit distal tip infection TECHNOLOGIST PROVIDED HISTORY: 4th digit distal tip infection FINDINGS: Interval worsening soft tissue swelling tip 4th digit, now with contour irregularity; in addition, along anterior aspect distal phalanx, cortical erosion/adjacent bone or periosteal new bone now identified, suspicious for osteomyelitis. No soft tissue gas or radiopaque FB Again noted are interphalangeal degenerative changes, especially DIPs and 5th metacarpal phalangeal joint; calcium deposit lateral to the 2nd DIP again noted. Mild degenerative changes triscaphe joints. Vascular calcifications. Swelling tip 4th digit compatible with the history of infection, with findings concerning for osteomyelitis involving the palmar aspect DP. No radiopaque foreign body. Similar degenerative changes. Vascular calcifications. EKG  None    All EKG's are interpreted by the Emergency Department Physician who either signs or Co-signs this chart in the absence of a cardiologist.    EMERGENCY DEPARTMENT COURSE:  Patient found seated upright in bed, uncomfortable appearing but no acute distress.   Engaged and cooperative in exam.  Physical exam notable for exquisitely tender and necrotic surgical laceration of the fat pad of the distal tip of the fourth digit on the left hand. X-ray and history concerning for osteomyelitis. Infectious disease consulted as they have been following. Recommended starting ceftaroline and Flagyl. Request patient be admitted for IV antibiotics and MRI of the hand for further osteomyelitis evaluation. Admit observation for pain control, IV antibiotics, and infectious disease. Admission discussed with patient who is in agreement. Educated on likely hospitalization course with all questions answered to patient satisfaction. Observation plan:  1. Continue antibiotics per infectious disease  2. Pain management with scheduled multimodal pain therapy and as needed oxycodone  3.  N.p.o. at midnight for possible intervention  4.  A.m. labs  5. MRI hand with without contrast per infectious disease  6. Will likely require hand surgery consultation after MRI results obtained  7. Disposition pending infectious disease and hand surgery    PROCEDURES:  None    CONSULTS:  IP CONSULT TO INFECTIOUS DISEASES    CRITICAL CARE:  None    FINAL IMPRESSION      1. Other acute osteomyelitis of left hand ([address]          DISPOSITION / PLAN     DISPOSITION Admitted 03/11/2021 09:03:04 PM      PATIENT REFERRED TO:  No follow-up provider specified.     DISCHARGE MEDICATIONS:  Current Discharge Medication List          Wes Dye MD  Emergency Medicine Resident    (Please note that portions of thisnote were completed with a voice recognition program.  Efforts were made to edit the dictations but occasionally words are mis-transcribed.)        Wes Dye MD  Resident  03/11/21 [id number]

## 2021-03-11 NOTE — ED PROVIDER NOTES
[facility]  FACULTY HANDOFF       Handoff taken on the following patient from prior Attending Physician:  Pt Name: Shannon Honey  PCP:  Pamela Curran MD    Attestation  I was available and discussed any additional care issues that arose and coordinated the management plans with the resident(s) caring for the patient during my duty period. Any areas of disagreement with resident's documentation of care or procedures are noted on the chart. I was personally present for the key portions of any/all procedures during my duty period. I have documented in the chart those procedures where I was not present during the key portions.              Tylor Bautista MD  03/11/21 [id number]

## 2021-03-12 ENCOUNTER — APPOINTMENT (OUTPATIENT)
Dept: MRI IMAGING | Age: 56
DRG: 952 | End: 2021-03-12
Payer: COMMERCIAL

## 2021-03-12 LAB
ABSOLUTE EOS #: 0.2 K/UL (ref 0–0.44)
ABSOLUTE IMMATURE GRANULOCYTE: <0.03 K/UL (ref 0–0.3)
ABSOLUTE LYMPH #: 1.99 K/UL (ref 1.1–3.7)
ABSOLUTE MONO #: 0.66 K/UL (ref 0.1–1.2)
ANION GAP SERPL CALCULATED.3IONS-SCNC: 13 MMOL/L (ref 9–17)
BASOPHILS # BLD: 1 % (ref 0–2)
BASOPHILS ABSOLUTE: 0.04 K/UL (ref 0–0.2)
BUN BLDV-MCNC: 12 MG/DL (ref 6–20)
BUN/CREAT BLD: ABNORMAL (ref 9–20)
CALCIUM SERPL-MCNC: 8.7 MG/DL (ref 8.6–10.4)
CHLORIDE BLD-SCNC: 103 MMOL/L (ref 98–107)
CO2: 22 MMOL/L (ref 20–31)
CREAT SERPL-MCNC: 0.75 MG/DL (ref 0.5–0.9)
DIFFERENTIAL TYPE: ABNORMAL
EOSINOPHILS RELATIVE PERCENT: 4 % (ref 1–4)
GFR AFRICAN AMERICAN: >60 ML/MIN
GFR NON-AFRICAN AMERICAN: >60 ML/MIN
GFR SERPL CREATININE-BSD FRML MDRD: ABNORMAL ML/MIN/{1.73_M2}
GFR SERPL CREATININE-BSD FRML MDRD: ABNORMAL ML/MIN/{1.73_M2}
GLUCOSE BLD-MCNC: 203 MG/DL (ref 70–99)
GLUCOSE BLD-MCNC: 246 MG/DL (ref 65–105)
HCT VFR BLD CALC: 29.6 % (ref 36.3–47.1)
HEMOGLOBIN: 8.8 G/DL (ref 11.9–15.1)
IMMATURE GRANULOCYTES: 0 %
LYMPHOCYTES # BLD: 40 % (ref 24–43)
MCH RBC QN AUTO: 31.3 PG (ref 25.2–33.5)
MCHC RBC AUTO-ENTMCNC: 29.7 G/DL (ref 28.4–34.8)
MCV RBC AUTO: 105.3 FL (ref 82.6–102.9)
MONOCYTES # BLD: 14 % (ref 3–12)
NRBC AUTOMATED: 0 PER 100 WBC
PDW BLD-RTO: 14 % (ref 11.8–14.4)
PLATELET # BLD: 298 K/UL (ref 138–453)
PLATELET ESTIMATE: ABNORMAL
PMV BLD AUTO: 9.7 FL (ref 8.1–13.5)
POTASSIUM SERPL-SCNC: 4.1 MMOL/L (ref 3.7–5.3)
RBC # BLD: 2.81 M/UL (ref 3.95–5.11)
RBC # BLD: ABNORMAL 10*6/UL
SEG NEUTROPHILS: 41 % (ref 36–65)
SEGMENTED NEUTROPHILS ABSOLUTE COUNT: 2 K/UL (ref 1.5–8.1)
SODIUM BLD-SCNC: 138 MMOL/L (ref 135–144)
WBC # BLD: 4.9 K/UL (ref 3.5–11.3)
WBC # BLD: ABNORMAL 10*3/UL

## 2021-03-12 PROCEDURE — 6370000000 HC RX 637 (ALT 250 FOR IP): Performed by: INTERNAL MEDICINE

## 2021-03-12 PROCEDURE — 6370000000 HC RX 637 (ALT 250 FOR IP): Performed by: STUDENT IN AN ORGANIZED HEALTH CARE EDUCATION/TRAINING PROGRAM

## 2021-03-12 PROCEDURE — 94640 AIRWAY INHALATION TREATMENT: CPT

## 2021-03-12 PROCEDURE — 36415 COLL VENOUS BLD VENIPUNCTURE: CPT

## 2021-03-12 PROCEDURE — G0378 HOSPITAL OBSERVATION PER HR: HCPCS

## 2021-03-12 PROCEDURE — 6370000000 HC RX 637 (ALT 250 FOR IP): Performed by: EMERGENCY MEDICINE

## 2021-03-12 PROCEDURE — 99254 IP/OBS CNSLTJ NEW/EST MOD 60: CPT | Performed by: INTERNAL MEDICINE

## 2021-03-12 PROCEDURE — 1200000000 HC SEMI PRIVATE

## 2021-03-12 PROCEDURE — 73220 MRI UPPR EXTREMITY W/O&W/DYE: CPT

## 2021-03-12 PROCEDURE — 76937 US GUIDE VASCULAR ACCESS: CPT

## 2021-03-12 PROCEDURE — 6360000004 HC RX CONTRAST MEDICATION: Performed by: STUDENT IN AN ORGANIZED HEALTH CARE EDUCATION/TRAINING PROGRAM

## 2021-03-12 PROCEDURE — 6360000002 HC RX W HCPCS: Performed by: STUDENT IN AN ORGANIZED HEALTH CARE EDUCATION/TRAINING PROGRAM

## 2021-03-12 PROCEDURE — 80048 BASIC METABOLIC PNL TOTAL CA: CPT

## 2021-03-12 PROCEDURE — A9579 GAD-BASE MR CONTRAST NOS,1ML: HCPCS | Performed by: STUDENT IN AN ORGANIZED HEALTH CARE EDUCATION/TRAINING PROGRAM

## 2021-03-12 PROCEDURE — 6370000000 HC RX 637 (ALT 250 FOR IP): Performed by: NURSE PRACTITIONER

## 2021-03-12 PROCEDURE — 85025 COMPLETE CBC W/AUTO DIFF WBC: CPT

## 2021-03-12 PROCEDURE — 82947 ASSAY GLUCOSE BLOOD QUANT: CPT

## 2021-03-12 PROCEDURE — 2580000003 HC RX 258: Performed by: STUDENT IN AN ORGANIZED HEALTH CARE EDUCATION/TRAINING PROGRAM

## 2021-03-12 RX ORDER — ONDANSETRON 4 MG/1
4 TABLET, ORALLY DISINTEGRATING ORAL EVERY 6 HOURS PRN
Status: DISCONTINUED | OUTPATIENT
Start: 2021-03-12 | End: 2021-03-15 | Stop reason: HOSPADM

## 2021-03-12 RX ORDER — LORAZEPAM 1 MG/1
1 TABLET ORAL
Status: COMPLETED | OUTPATIENT
Start: 2021-03-12 | End: 2021-03-12

## 2021-03-12 RX ORDER — OXYCODONE HYDROCHLORIDE 5 MG/1
10 TABLET ORAL EVERY 6 HOURS PRN
Status: DISCONTINUED | OUTPATIENT
Start: 2021-03-12 | End: 2021-03-13

## 2021-03-12 RX ORDER — SODIUM CHLORIDE 0.9 % (FLUSH) 0.9 %
10 SYRINGE (ML) INJECTION PRN
Status: DISCONTINUED | OUTPATIENT
Start: 2021-03-12 | End: 2021-03-15 | Stop reason: HOSPADM

## 2021-03-12 RX ORDER — GABAPENTIN 300 MG/1
900 CAPSULE ORAL 3 TIMES DAILY
Status: DISCONTINUED | OUTPATIENT
Start: 2021-03-12 | End: 2021-03-15 | Stop reason: HOSPADM

## 2021-03-12 RX ORDER — ONDANSETRON 2 MG/ML
4 INJECTION INTRAMUSCULAR; INTRAVENOUS EVERY 6 HOURS PRN
Status: DISCONTINUED | OUTPATIENT
Start: 2021-03-12 | End: 2021-03-12

## 2021-03-12 RX ADMIN — PANTOPRAZOLE SODIUM 40 MG: 40 TABLET, DELAYED RELEASE ORAL at 06:03

## 2021-03-12 RX ADMIN — IBUPROFEN 400 MG: 400 TABLET, FILM COATED ORAL at 20:21

## 2021-03-12 RX ADMIN — METRONIDAZOLE 500 MG: 500 TABLET ORAL at 11:58

## 2021-03-12 RX ADMIN — INSULIN GLARGINE 15 UNITS: 100 INJECTION, SOLUTION SUBCUTANEOUS at 21:29

## 2021-03-12 RX ADMIN — CEFTAROLINE FOSAMIL 600 MG: 600 POWDER, FOR SOLUTION INTRAVENOUS at 18:35

## 2021-03-12 RX ADMIN — BUDESONIDE AND FORMOTEROL FUMARATE DIHYDRATE 2 PUFF: 80; 4.5 AEROSOL RESPIRATORY (INHALATION) at 07:47

## 2021-03-12 RX ADMIN — ACETAMINOPHEN 1000 MG: 500 TABLET ORAL at 13:57

## 2021-03-12 RX ADMIN — ARIPIPRAZOLE 10 MG: 10 TABLET ORAL at 11:56

## 2021-03-12 RX ADMIN — CEFTAROLINE FOSAMIL 600 MG: 600 POWDER, FOR SOLUTION INTRAVENOUS at 05:52

## 2021-03-12 RX ADMIN — LORAZEPAM 1 MG: 1 TABLET ORAL at 14:12

## 2021-03-12 RX ADMIN — ACETAMINOPHEN 1000 MG: 500 TABLET ORAL at 06:03

## 2021-03-12 RX ADMIN — ONDANSETRON 4 MG: 4 TABLET, ORALLY DISINTEGRATING ORAL at 10:57

## 2021-03-12 RX ADMIN — BUDESONIDE AND FORMOTEROL FUMARATE DIHYDRATE 2 PUFF: 80; 4.5 AEROSOL RESPIRATORY (INHALATION) at 20:44

## 2021-03-12 RX ADMIN — GABAPENTIN 900 MG: 300 CAPSULE ORAL at 21:28

## 2021-03-12 RX ADMIN — OXYCODONE HYDROCHLORIDE 10 MG: 5 TABLET ORAL at 13:57

## 2021-03-12 RX ADMIN — DOCUSATE SODIUM 100 MG: 100 CAPSULE, LIQUID FILLED ORAL at 20:21

## 2021-03-12 RX ADMIN — OXYCODONE HYDROCHLORIDE 5 MG: 5 TABLET ORAL at 08:10

## 2021-03-12 RX ADMIN — OXYCODONE HYDROCHLORIDE 10 MG: 5 TABLET ORAL at 20:22

## 2021-03-12 RX ADMIN — VENLAFAXINE HYDROCHLORIDE 150 MG: 150 CAPSULE, EXTENDED RELEASE ORAL at 11:55

## 2021-03-12 RX ADMIN — TRAZODONE HYDROCHLORIDE 150 MG: 100 TABLET ORAL at 20:21

## 2021-03-12 RX ADMIN — METRONIDAZOLE 500 MG: 500 TABLET ORAL at 18:35

## 2021-03-12 RX ADMIN — IBUPROFEN 400 MG: 400 TABLET, FILM COATED ORAL at 18:35

## 2021-03-12 RX ADMIN — GADOTERIDOL 20 ML: 279.3 INJECTION, SOLUTION INTRAVENOUS at 15:51

## 2021-03-12 RX ADMIN — IBUPROFEN 400 MG: 400 TABLET, FILM COATED ORAL at 11:56

## 2021-03-12 ASSESSMENT — ENCOUNTER SYMPTOMS
ABDOMINAL DISTENTION: 0
SHORTNESS OF BREATH: 0
CONSTIPATION: 0
ABDOMINAL PAIN: 0
EYE PAIN: 0
SORE THROAT: 0
DIARRHEA: 0
EYE ITCHING: 0
NAUSEA: 0
COUGH: 0
SINUS PAIN: 0
SINUS PRESSURE: 0

## 2021-03-12 ASSESSMENT — PAIN SCALES - GENERAL
PAINLEVEL_OUTOF10: 7
PAINLEVEL_OUTOF10: 10

## 2021-03-12 NOTE — ED NOTES
ED to inpatient nurses report    Chief Complaint   Patient presents with    Finger Pain      Present to ED from [facility]  LOC: alert and orientated to name, place, date  Vital signs   Vitals:    03/11/21 1606   BP: 115/75   Pulse: 90   Resp: 16   Temp: 98.1 °F (36.7 °C)   TempSrc: Oral   SpO2: 97%   Weight: 250 lb (113.4 kg)   Height: 5' 6\" (1.676 m)      Oxygen Baseline RA    Current needs required None  LDAs:   Peripheral IV 03/11/21 Right Antecubital (Active)   Site Assessment Clean;Dry; Intact 03/11/21 1952     Mobility: Independent  Pending ED orders: MRI for Tomorrow. Present condition: Stable  Code Status: Full  Consults:  [x]  Hospitalist  Completed  [x] yes [] no  []  Medicine  Completed  [] yes [] No  []  Cardiology  Completed  [] yes [] No  []  GI   Completed  [] yes [] No  []  Neurology  Completed  [] yes [] No  []  Nephrology Completed  [] yes [] No  []  Vascular  Completed  [] yes [] No   []  Surgery  Completed  [] yes [] No   []  Urology  Completed  [] yes [] No   []  Plastics  Completed  [] yes [] No   []  ENT  Completed  [] yes [] No   [x]  Other Infectious Disease  Completed  [] yes [x] No  Pertinent event(s) Pt has osteomyelitis of left finger. MRI ordered for tomorrow. IV and PO ATB started.     Pertinent event(s)   Electronically signed by Jazmin Benton RN on 3/11/2021 at 9:40 PM     Jazmin Benton RN  03/11/21 [id number]

## 2021-03-12 NOTE — CONSULTS
[facility] -   Infectious diseases evaluation  admission date 3/11/2021    reason for consultation:   Finger osteo    Impression :   Current:  · Left ring finger abscess/necrotic wound  · Abscess lanced 2/24/2021, MRSA group B strep Candida albicans, Prevotella  · Osteomyelitis left 4th finger distal phalanx  · Elevated ESR    Other:  ·   Discussion / summary of stay / plan of care   · Concern for underlying osteomyelitis  · Patient did not follow through with antibiotics as outpatient  Recommendations   · Flagyl and ceftaroline for now  · MRI of the finger to ck on extend of the finger osteomyelitis  · Hand/plastic surgery to evaluate for possible debridement  · Case discussed with observation team, will follow with you    Infection Control Recommendations   · [place] Precautions  · Contact Isolation   Antimicrobial Stewardship Recommendations   · Simplification of therapy  · Targeted therapy      Coordination ofOutpatient Care:   · Estimated Length of IV antimicrobials:  · Patient will need Midline / picc Catheter Insertion:   · Patient will need SNF:  · Patient will need outpatient wound care:     History of Present Illness:   Initial history:  Annemarie Townsend is a 50y.o.-year-old female known to my service for having felon on her finger, had it lanced and cultured, growing MRSA group B strep Prevotella, given oral antibiotics and follow-up as outpatient. Patient was supposed to be still taking oral antibiotics as I understand she stopped taking them and came back to the ER with worsening in her finger condition.   Admitted to observation, infectious was consulted-the fingertip has now a necrotic wound/scab  Pain has increased,            Interval changes  3/12/2021   Patient Vitals for the past 8 hrs:   BP Temp Temp src Pulse Resp SpO2   03/12/21 0814 (!) 144/91 97.2 °F (36.2 °C) Oral 89 16 100 %       Summary of relevant labs:  Labs:  WE 4.5    CRP normal    Micro:  Finger culture 2/24 MRSA group B strep Candida albicans Prevotella  Imaging:  Xray suggesting OM tip of the finger    I have personally reviewed the past medical history, past surgical history, medications, social history, and family history, and I haveupdated the database accordingly. Allergies:   Bactrim [sulfamethoxazole-trimethoprim] and Adhesive tape     Review of Systems:     Review of Systems   Constitutional: Negative for activity change. HENT: Negative for congestion. Eyes: Negative for discharge. Respiratory: Negative for apnea. Cardiovascular: Negative for chest pain. Gastrointestinal: Negative for abdominal distention. Genitourinary: Negative for dysuria. Musculoskeletal: Negative for arthralgias. Skin: Positive for color change and wound. Allergic/Immunologic: Negative for immunocompromised state. Neurological: Negative for dizziness. Hematological: Negative for adenopathy. Psychiatric/Behavioral: Negative for agitation. Physical Examination :       Physical Exam  Constitutional:       General: She is not in acute distress. Appearance: Normal appearance. She is not ill-appearing. HENT:      Head: Normocephalic and atraumatic. Nose: Nose normal.      Mouth/Throat:      Mouth: Mucous membranes are moist.   Eyes:      General: No scleral icterus. Conjunctiva/sclera: Conjunctivae normal.   Neck:      Musculoskeletal: Neck supple. No neck rigidity. Cardiovascular:      Rate and Rhythm: Normal rate and regular rhythm. Heart sounds: Normal heart sounds. No murmur. Pulmonary:      Effort: No respiratory distress. Breath sounds: Normal breath sounds. No stridor. Abdominal:      General: There is no distension. Palpations: Abdomen is soft. There is no mass. Genitourinary:     Comments: No cullen  Musculoskeletal:         General: No swelling or deformity. Skin:     General: Skin is dry.       Coloration: Skin is not jaundiced. Neurological:      General: No focal deficit present. Mental Status: She is alert and oriented to person, place, and time. Cranial Nerves: No cranial nerve deficit. Sensory: No sensory deficit. Psychiatric:         Mood and Affect: Mood normal.         Thought Content:  Thought content normal.         Past Medical History:     Past Medical History:   Diagnosis Date    Acid reflux 5/29/2017    Acute cystitis with hematuria 10/11/2016    Acute non-recurrent maxillary sinusitis 11/28/2017    Asthma     Bipolar 1 disorder ([address] 1/4/2018    Bipolar disorder, mixed ([address]     BMI 34.0-34.9,adult 11/28/2017    Cannabis use disorder, severe, dependence ([address] 12/19/2017    Cerebrovascular accident (CVA) ([address] 6/14/2017    Chest pain 11/5/2016    Chronic renal insufficiency     Cocaine abuse ([address] 1/5/2018    DDD (degenerative disc disease), cervical     Diabetes mellitus ([address]     Dizziness 6/13/2017    Fibromyalgia     History of stroke 9/9/2017    Homicidal ideation 11/6/2017    Hyperglycemia     Hypertension     Hypotension 1/18/2019    IDDM (insulin dependent diabetes mellitus) 12/21/2015    Lupus ([address]     Migraine     Neuropathy     Neuropathy     Polysubstance abuse ([address] 9/17/2017    Post traumatic stress disorder (PTSD)     Posttraumatic stress disorder 5/29/2017    Recurrent depression ([address] 5/29/2017    Recurrent major depressive disorder, in partial remission ([address] 11/28/2017    Screening mammogram, encounter for 11/28/2017    Severe recurrent major depression with psychotic features ([address] 12/19/2017    Severe recurrent major depression without psychotic features ([address] 12/19/2017    Stroke (cerebrum) ([address] 6/14/2017    Stroke ([address]     per chart notes    Suicidal ideation     Suicidal intent 3/10/2017    Vitamin D deficiency 11/28/2017    White matter changes 6/13/2017       Past Surgical  History:     Past Surgical History:   Procedure Laterality Date    ABDOMEN SURGERY      drain tube    ABSCESS DRAINAGE      right buttock    CATARACT REMOVAL WITH IMPLANT Bilateral     CHEST TUBE INSERTION      LASIK Bilateral        Medications:      metroNIDAZOLE  500 mg Oral 3 times per day    ceftaroline fosamil (TEFLARO) IVPB  600 mg Intravenous Q12H    ARIPiprazole  10 mg Oral Daily    budesonide-formoterol  2 puff Inhalation BID    docusate sodium  100 mg Oral BID    FLUoxetine  60 mg Oral Daily    insulin glargine  15 Units Subcutaneous Nightly    metFORMIN  1,000 mg Oral Daily with breakfast    pantoprazole  40 mg Oral QAM AC    alogliptin  25 mg Oral Daily    traZODone  150 mg Oral Nightly    venlafaxine  150 mg Oral Daily with breakfast    sodium chloride flush  10 mL Intravenous 2 times per day    enoxaparin  40 mg Subcutaneous Daily    acetaminophen  1,000 mg Oral 3 times per day    ibuprofen  400 mg Oral 4x Daily WC       Social History:     Social History     Socioeconomic History    Marital status: Legally      Spouse name: Not on file    Number of children: Not on file    Years of education: Not on file    Highest education level: Not on file   Occupational History    Not on file   Social Needs    Financial resource strain: Not on file    Food insecurity     Worry: Never true     Inability: Never true    Transportation needs     Medical: No     Non-medical: No   Tobacco Use    Smoking status: Never Smoker    Smokeless tobacco: Never Used   Substance and Sexual Activity    Alcohol use: Not Currently    Drug use: Not Currently     Types: Marijuana, Cocaine    Sexual activity: Not Currently     Partners: Male   Lifestyle    Physical activity     Days per week: Not on file     Minutes per session: Not on file    Stress: Not on file   Relationships    Social connections     Talks on phone: Not on file     Gets together: Not on file     Attends Bahai service: Not on file     Active member of club or organization: Not on file     Attends meetings of clubs or organizations: Not on file     Relationship status: Not on file    Intimate partner violence     Fear of current or ex partner: Not on file     Emotionally abused: Not on file     Physically abused: Not on file     Forced sexual activity: Not on file   Other Topics Concern    Not on file   Social History Narrative    Not on file       Family History:     Family History   Problem Relation Age of Onset    Diabetes Mother     Stroke Mother     Diabetes Father     Diabetes Sister     Diabetes Brother     Other Son         GSW    No Known Problems Sister       Medical Decision Making:   I have independently reviewed/ordered the following labs:    CBC with Differential:   Recent Labs     03/11/21 1935 03/12/21  0501   WBC 5.3 4.9   HGB 9.7* 8.8*   HCT 31.9* 29.6*   PLT See Reflexed IPF Result 298   LYMPHOPCT 38 40   MONOPCT 8 14*     BMP:  Recent Labs     03/11/21 1935 03/12/21  0501    138   K 4.2 4.1    103   CO2 24 22   BUN 12 12   CREATININE 0.73 0.75     Hepatic Function Panel: No results for input(s): PROT, LABALBU, BILIDIR, IBILI, BILITOT, ALKPHOS, ALT, AST in the last 72 hours. No results for input(s): RPR in the last 72 hours. No results for input(s): HIV in the last 72 hours. No results for input(s): BC in the last 72 hours. Lab Results   Component Value Date    CREATININE 0.75 03/12/2021    GLUCOSE 203 03/12/2021       Detailed results: Thank you for allowing us to participate in the care of this patient. Please call with questions. This note is created with the assistance of a speech recognition program.  While intending to generate adocument that actually reflects the content of the visit, the document can still have some errors including those of syntax and sound a like substitutions which may escape proof reading. It such instances, actual meaningcan be extrapolated by contextual diversion.     Eugenio Pickett MD  Office: [phone]  Perfect serve / office [phone]

## 2021-03-12 NOTE — PROGRESS NOTES
Will need to call [facility] to get complete surgery history for MRI check list. Pt needs ultrasound guided PIV for MRI and for IV ATBs. Pt states she will something for MRI. MRI will be half hour, laying on stomach and hands above head.

## 2021-03-12 NOTE — PROGRESS NOTES
[address]  CDU / OBSERVATION ENCOUNTER  ATTENDING NOTE       I performed a history and physical examination of the patient and discussed management with the resident or midlevel provider. I reviewed the resident or midlevel provider's note and agree with the documented findings and plan of care. Any areas of disagreement are noted on the chart. I was personally present for the key portions of any procedures. I have documented in the chart those procedures where I was not present during the key portions. I have reviewed the nurses notes. I agree with the chief complaint, past medical history, past surgical history, allergies, medications, social and family history as documented unless otherwise noted below. The Family history, social history, and ROS are effectively unchanged since admission unless noted elsewhere in the chart. Patient admitted for parenteral antibiotics patient has osteomyelitis to finger. To be seen by hand. Patient was failing outpatient oral antibiotic treatment.     Elijah Andres MD  Attending Emergency  Physician

## 2021-03-12 NOTE — CARE COORDINATION
Case Management Initial Discharge Plan  Terri Palmer,             Met with:patient to discuss discharge plans. Information verified: address, contacts, phone number, , insurance Yes    Emergency Contact/Next of Kin name & number: Aurelio Swanson, Alexis [phone]    PCP: Mae Conner MD  Date of last visit: 6 weeks ago     Insurance Provider: Eric     Discharge Planning    Living Arrangements:  Other (Comment)   Support Systems:  Friends/Neighbors    Home has 2 stories  4 stairs to climb to get into front door, 1 flight stairs to climb to reach second floor  Location of bedroom/bathroom in home second floor     Patient able to perform ADL's:Assisted    [facility] & [facility]  DME equipment: none   DME provider: na     Receiving oral anticoagulation therapy? No    If indicated:   Physician managing anticoagulation treatment: na  Where does patient obtain lab work for ATC treatment? na      Potential Assistance Needed:  N/A    Patient agreeable to home care: No  [place] of choice provided:  n/a    Prior SNF/Rehab Placement and Facility: [facility] to SNF/Rehab: Yes  [place] of choice provided: yes     Evaluation: no    Expected Discharge date:  21    Patient expects to be discharged to:  facility  Follow Up Appointment: Best Day/ Time: Wednesday PM    Transportation provider: Patient states that she will take a cab  Transportation arrangements needed for discharge: Yes     Readmission Risk              Risk of Unplanned Readmission:        0             Does patient have a readmission risk score greater than 14?: No  If yes, follow-up appointment must be made within 7 days of discharge. Goals of Care: increased strength       Discharge Plan: Return to [address] [place] [address] via cab.           Electronically signed by Pedro Freedman RN on 3/12/21 at 2:20 PM EST

## 2021-03-12 NOTE — DISCHARGE INSTR - COC
Continuity of Care Form    Patient Name: Yong Everett   :  1965  MRN:  [id number]    Admit date:  3/11/2021  Discharge date:  3/15/2021    Code Status Order: Full Code   Advance Directives:   [facility] Documentation       Date/Time Healthcare Directive Type of Healthcare Directive Copy in [address] Agent's Name Healthcare Agent's Phone Number    21 [phone]  No, patient does not have an advance directive for healthcare treatment -- -- -- -- --            Admitting Physician:  Gloria Curran MD  PCP: Susan Penny MD    Discharging Nurse: [facility] Unit/Room#: 0814/4768-25  Discharging Unit Phone Number: [phone]    Emergency Contact:   Extended Emergency Contact Information  Primary Emergency Contact: Leora Palmer  Address: NOT AVAILABLE   [address] Phone: [phone]  Relation: Child  Secondary Emergency Contact: Tommy Cordero   [address] Phone: [phone]  Relation: Spouse    Past Surgical History:  Past Surgical History:   Procedure Laterality Date    ABDOMEN SURGERY      drain tube    ABSCESS DRAINAGE      right buttock    CATARACT REMOVAL WITH IMPLANT Bilateral     CHEST TUBE INSERTION      LASIK Bilateral        Immunization History: There is no immunization history on file for this patient.     Active Problems:  Patient Active Problem List   Diagnosis Code    IDDM (insulin dependent diabetes mellitus) [id number]    Lupus ([facility] M32.9    Bipolar disorder, mixed ([facility] F31.60    Post traumatic stress disorder (PTSD) F43.10    Acute cystitis with hematuria N30.01    Hyperglycemia R73.9    Suicidal ideation R45.851    Arthritis M19.90    Chronic back pain M54.9, G89.29    Acid reflux K21.9    History of stroke Z86.73    Polysubstance abuse ([address] F19.10    Bipolar 1 disorder ([address] F31.9    Cocaine abuse ([facility] F14.10    Chest pain R07.9    Acute non-recurrent maxillary sinusitis J01.00    Acute respiratory failure with hypercapnia ([facility]) J96.02    Alcohol use disorder, severe, dependence ([facility]) F10.20    Asthma exacerbation attacks J45. 901    Bilateral edema of lower extremity R60.0    Bipolar 1 disorder, depressed, severe ([facility]) F31.4    BMI 34.0-34.9,adult Z68.34    Cannabis use disorder, severe, dependence (HCC) F12.20    Cocaine use disorder, severe, dependence ([facility]) F14.20    Dizziness R42    Dyslipidemia E78.5    Family history of colon cancer Z80.0    History of lupus [id number]    Homicidal ideation R45.850    Hypotension I95.9    Neuropathy G62.9    Normocytic anemia D64.9    Recurrent depression ([facility]) F33.9    Recurrent major depressive disorder, in partial remission ([facility]) F33.41    Severe recurrent major depression with psychotic features ([facility]) F33.3    Severe recurrent major depression without psychotic features ([facility]) F33.2    Upper GI bleed K92.2    Vitamin D deficiency E55.9    White matter changes [id number]    Gastroesophageal reflux disease K21.9    Stroke (cerebrum) ([facility]) I63.9    Rib lesion M89.9    Type 2 diabetes mellitus ([facility]) E11.9    YAEL (generalized anxiety disorder) F41.1    Posttraumatic stress disorder F43.10    Suicidal intent R45.851    Leg weakness, bilateral R29.898    Poorly controlled diabetes mellitus ([facility]) E11.65    Acute kidney injury ([facility]) N17.9    Felon of finger L03.019    Osteomyelitis ([facility]) M86.9       Isolation/Infection:   Isolation            No Isolation          Patient Infection Status       Infection Onset Added Last Indicated Last Indicated By Review Planned Expiration Resolved Resolved By    [facility] 02/24/21 02/27/21 02/24/21 Culture, Anaerobic and Aerobic        Finger 2/2021            Nurse Assessment:  Last Vital Signs: BP (!) 144/91   Pulse 89   Temp 97.2 °F (36.2 °C) (Oral)   Resp 16   Ht 5' 6\" (1.676 m)   Wt 250 lb (113.4 kg)   LMP  (LMP Unknown)   SpO2 100%   BMI 40.35 kg/m² Last documented pain score (0-10 scale): Pain Level: 10  Last Weight:   Wt Readings from Last 1 Encounters:   03/11/21 250 lb (113.4 kg)     Mental Status:  oriented and alert    IV Access:- None    Nursing Mobility/ADLs:  Walking   Independent  Transfer  Independent  Bathing  Independent  Dressing  Independent  Toileting  Independent  Feeding  Independent  Med [address]  Med Delivery   whole    Wound Care Documentation and Therapy:  Wound 02/25/21 Finger (Comment which one) Anterior; Left (Active)   Wound Etiology Traumatic 02/25/21 0921   Dressing Status Clean;Dry; Intact 02/25/21 0921   Dressing/Treatment Antibacterial ointment;Dry dressing 02/25/21 0921   Wound Assessment Dry; Other (Comment); Slough;Pink/red 02/25/21 0921   Drainage Amount None 02/25/21 0921   Tamar-wound Assessment Intact 02/25/21 0921   Number of days: 15        Elimination:  Continence:   · Bowel: Yes  · Bladder: Yes  Urinary Catheter: None   Colostomy/Ileostomy/Ileal Conduit: No       Date of Last BM: ***  No intake or output data in the 24 hours ending 03/12/21 1419  No intake/output data recorded. Safety Concerns:   None    Impairments/Disabilities:    Amputation - left ring finger    Nutrition Therapy:  Current Nutrition Therapy: - Oral Diet:  General and Carb Control 4 carbs/meal (1800kcals/day)    Routes of Feeding: Oral  Liquids: No Restrictions  Daily Fluid Restriction: no  Last Modified Barium Swallow with Video (Video Swallowing Test): not done, not ordered    Treatments at the Time of Hospital Discharge:   Respiratory Treatments: none  Oxygen Therapy:  is not on home oxygen therapy.   Ventilator:  - No ventilator support    Rehab Therapies: nursing assessment  Weight Bearing Status/Restrictions: No weight bearing restirctions  Other Medical Equipment (for information only, NOT a DME order):  none  Other Treatments: Blood sugar checks Ac & HS    Patient's personal belongings (please select all that are sent with patient): {[facility] Belongings:[id number]}    RN SIGNATURE:  Electronically signed by Gab Tyler RN on 3/15/21 at 2:58 PM EDT    CASE MANAGEMENT/SOCIAL WORK SECTION    Inpatient Status Date: ***    Readmission Risk Assessment Score:  Readmission Risk              Risk of Unplanned Readmission:        0           Discharging to Facility/ Agency   [facility] Details  FAX            [address] [place] [address] [address], [address] [place] [address]       Phone: [phone]       Fax: [phone]          / signature: Electronically signed by Anais Garza RN on 3/12/21 at 2:19 PM EST    PHYSICIAN SECTION    Prognosis: Good    Condition at Discharge: Stable    Rehab Potential (if transferring to Rehab): Good    Recommended Labs or Other Treatments After Discharge:      Physician Certification: I certify the above information and transfer of Lucinda Arrant  is necessary for the continuing treatment of the diagnosis listed and that she requires Home Care for greater 30 days.      Update Admission H&P: No change in H&P    PHYSICIAN SIGNATURE:  Electronically signed by Geovani Lara MD on 3/15/21 at 2:04 PM EDT

## 2021-03-12 NOTE — CONSULTS
Plastics Consult - Brad      Re: infection/osteomyelitis left ring finger. Patient presents back with worsening of left ring finger. She was seen last month. Zca was I&D 'd and she was placed on antibiotics. There is concern whether she took them correctly. PAST MEDICAL / SURGICAL / SOCIAL / FAMILY HISTORY       has a past medical history of Acid reflux, Acute cystitis with hematuria, Acute non-recurrent maxillary sinusitis, Asthma, Bipolar 1 disorder ([address] Bipolar disorder, mixed ([address] BMI 34.0-34.9,adult, Cannabis use disorder, severe, dependence ([address] Cerebrovascular accident (CVA) ([address] Chest pain, Chronic renal insufficiency, Cocaine abuse ([address] DDD (degenerative disc disease), cervical, Diabetes mellitus ([address] Dizziness, Fibromyalgia, History of stroke, Homicidal ideation, Hyperglycemia, Hypertension, Hypotension, IDDM (insulin dependent diabetes mellitus), Lupus ([address] Migraine, Neuropathy, Neuropathy, Polysubstance abuse ([address] Post traumatic stress disorder (PTSD), Posttraumatic stress disorder, Recurrent depression ([address] Recurrent major depressive disorder, in partial remission ([address] Screening mammogram, encounter for, Severe recurrent major depression with psychotic features ([address] Severe recurrent major depression without psychotic features ([address] Stroke (cerebrum) ([address] Stroke ([address] Suicidal ideation, Suicidal intent, Vitamin D deficiency, and White matter changes.      has a past surgical history that includes Abscess Drainage; chest tube insertion; Abdomen surgery; Cataract removal with implant (Bilateral); and LASIK (Bilateral).    Allergies:  Bactrim [sulfamethoxazole-trimethoprim] and Adhesive tape     Home Medications:  Home Medications[]Expand by Default           Prior to Admission medications    Medication Sig Start Date End Date Taking?  Authorizing Provider   metroNIDAZOLE (FLAGYL) 500 MG tablet Take 1 tablet by mouth 3 times daily for 10 days 3/1/21 3/11/21   Maylin T Phan Ramirez MD   metroNIDAZOLE (FLAGYL) 500 MG tablet Take 1 tablet by mouth 3 times daily for 14 days 3/1/21 3/15/21   Maylin Zepeda MD   doxycycline hyclate (VIBRA-TABS) 100 MG tablet Take 1 tablet by mouth 2 times daily for 14 days 3/1/21 3/15/21   Maylin Zepeda MD   cephALEXin (KEFLEX) 500 MG capsule Take 1 capsule by mouth 4 times daily for 14 days 3/1/21 3/15/21   María Ramirez MD   venlafaxine (EFFEXOR XR) 150 MG extended release capsule Take 1 capsule by mouth daily (with breakfast) 2/10/21     Arie Miranda MD   insulin glargine (LANTUS SOLOSTAR) 100 UNIT/ML injection pen Inject 15 Units into the skin nightly 2/9/21     Arie Miranda MD   Misc.  Devices MISC 1 pair of dm shoes, 3 accommodated inserts 1/22/21     Keerthi Edmondson DPM   dicyclomine (BENTYL) 20 MG tablet Take 1 tablet by mouth 3 times daily as needed (for abdominal discomfort) 1/13/21     Mae Conner MD   cetirizine (ZYRTEC) 10 MG tablet Take 1 tablet by mouth daily 1/13/21     Puma Reyes MD   Insulin Pen Needle ([employer] PEN NEEDLES 31G) 31G X 8 MM MISC 1 each by Does not apply route daily 1/4/21     Mae Conner MD   ARIPiprazole (ABILIFY) 10 MG tablet Take 1 tablet by mouth daily 12/11/20     Mae Conner MD   gabapentin (NEURONTIN) 300 MG capsule TAKE 3 CAPSULES 900 MG BY MOUTH THREE TIMES DAILY 12/9/20 1/8/21   Mae Conner MD    MG capsule TAKE 1 CAPSULE BY MOUTH TWICE DAILY 12/9/20     Mae Conner MD   acetaminophen (TYLENOL) 325 MG tablet TAKE 2 TABLETS BY MOUTH EVERY 6 HOURS AS NEEDED FOR PAIN 12/9/20     Mae Conner MD   metFORMIN (GLUCOPHAGE) 1000 MG tablet TAKE 1 TABLET BY MOUTH DAILY WITH BREAKFAST 12/9/20     Mae Conner MD   traZODone (DESYREL) 150 MG tablet Take 1 tablet by mouth nightly 11/18/20     Mae Conner MD   omeprazole (PRILOSEC) 20 MG delayed release capsule Take 1 capsule by mouth Daily 11/18/20     Mae Conner MD   FREESTYLE LITE strip 1 each by

## 2021-03-12 NOTE — H&P
[facility]  CDU / OBSERVATION ENCOUNTER  RESIDENT NOTE     Pt Name: Maya Perez  MRN: [id number]  [place] 1965  Date of evaluation: 3/12/21  Patient's PCP is :  Drew Law MD    CHIEF COMPLAINT       Chief Complaint   Patient presents with    Finger Pain         HISTORY OF PRESENT ILLNESS    Maya Perez is a 50 y.o. female who presents left fourth digit pain of several weeks duration after felon drainage. Has been noncompliant with outpatient antibiotic regimen. ED work-up demonstrated hand x-ray concerning for signs of osteomyelitis and elevated inflammatory markers. ID has been consulted and is following. Started on ceftaroline and Flagyl. Admitted for MRI and possible hand consultation. Medical history includes diabetes. Location/Symptom: Left fourth finger  Timing/Onset: Several weeks ago  Provocation: None  Quality: Achy, infected  Radiation: None  Severity: Moderate  Timing/Duration: Weeks  Modifying Factors: None    REVIEW OF SYSTEMS       Review of Systems   Constitutional: Negative for activity change, chills and fever. HENT: Negative for congestion, sinus pressure, sinus pain and sore throat. Eyes: Negative for pain and itching. Respiratory: Negative for cough and shortness of breath. Cardiovascular: Negative for chest pain. Gastrointestinal: Negative for abdominal distention, abdominal pain, constipation, diarrhea and nausea. Endocrine: Negative for polyuria. Genitourinary: Negative for dysuria and frequency. Musculoskeletal: Positive for arthralgias (left fourth digit) and myalgias. Skin: Negative for rash. Neurological: Negative for light-headedness and headaches. (PQRS) Advance directives on face sheet per hospital policy.  No change unless specifically mentioned in chart    PAST MEDICAL HISTORY    has a past medical history of Acid reflux, Acute cystitis with hematuria, Acute non-recurrent maxillary sinusitis, Asthma, Bipolar 1 disorder ([facility] Bipolar disorder, mixed ([facility] BMI 34.0-34.9,adult, Cannabis use disorder, severe, dependence ([facility] Cerebrovascular accident (CVA) ([facility] Chest pain, Chronic renal insufficiency, Cocaine abuse ([facility] DDD (degenerative disc disease), cervical, Diabetes mellitus ([facility] Dizziness, Fibromyalgia, History of stroke, Homicidal ideation, Hyperglycemia, Hypertension, Hypotension, IDDM (insulin dependent diabetes mellitus), Lupus ([facility] Migraine, Neuropathy, Neuropathy, Polysubstance abuse ([facility] Post traumatic stress disorder (PTSD), Posttraumatic stress disorder, Recurrent depression ([facility] Recurrent major depressive disorder, in partial remission ([facility] Screening mammogram, encounter for, Severe recurrent major depression with psychotic features ([facility] Severe recurrent major depression without psychotic features [facility] Stroke (cerebrum) ([facility] Stroke ([facility] Suicidal ideation, Suicidal intent, Vitamin D deficiency, and White matter changes. I have reviewed the past medical history with the patient and it is pertinent to this complaint. SURGICAL HISTORY      has a past surgical history that includes Abscess Drainage; chest tube insertion; Abdomen surgery; Cataract removal with implant (Bilateral); and LASIK (Bilateral). I have reviewed and agree with Surgical History entered and it is pertinent to this complaint.      CURRENT MEDICATIONS     metroNIDAZOLE (FLAGYL) tablet 500 mg, 3 times per day  ceftaroline fosamil (TEFLARO) 600 mg in dextrose 5 % 50 mL IVPB, Q12H  ARIPiprazole (ABILIFY) tablet 10 mg, Daily  budesonide-formoterol (SYMBICORT) 80-4.5 MCG/ACT inhaler 2 puff, BID  docusate sodium (COLACE) capsule 100 mg, BID  FLUoxetine (PROZAC) capsule 60 mg, Daily  insulin glargine (LANTUS) injection vial 15 Units, Nightly  melatonin tablet 3 mg, Nightly PRN  metFORMIN (GLUCOPHAGE) tablet 1,000 mg, Daily with breakfast  pantoprazole (PROTONIX) tablet 40 mg, QAM AC  alogliptin (NESINA) tablet 25 mg, Daily  traZODone (DESYREL) tablet 150 mg, Nightly  venlafaxine (EFFEXOR XR) extended release capsule 150 mg, Daily with breakfast  sodium chloride flush 0.9 % injection 10 mL, 2 times per day  sodium chloride flush 0.9 % injection 10 mL, PRN  enoxaparin (LOVENOX) injection 40 mg, Daily  acetaminophen (TYLENOL) tablet 1,000 mg, 3 times per day  0.9 % sodium chloride infusion, Continuous  ibuprofen (ADVIL;MOTRIN) tablet 400 mg, 4x Daily WC  oxyCODONE (ROXICODONE) immediate release tablet 5 mg, Q6H PRN        All medication charted and reviewed. ALLERGIES     is allergic to bactrim [sulfamethoxazole-trimethoprim] and adhesive tape. FAMILY HISTORY     She indicated that her mother is . She indicated that her father is . She indicated that only one of her two sisters is alive. She indicated that her brother is alive. She indicated that her son is . family history includes Diabetes in her brother, father, mother, and sister; No Known Problems in her sister; Other in her son; Stroke in her mother. The patient denies any pertinent family history. I have reviewed and agree with the family history entered. I have reviewed the Family History and it is not significant to the case    SOCIAL HISTORY      reports that she has never smoked. She has never used smokeless tobacco. She reports previous alcohol use. She reports previous drug use. Drugs: Marijuana and Cocaine. I have reviewed and agree with all Social.  There are no concerns for substance abuse/use. PHYSICAL EXAM     INITIAL VITALS:  height is 5' 6\" (1.676 m) and weight is 250 lb (113.4 kg). Her oral temperature is 97.2 °F (36.2 °C). Her blood pressure is 144/91 (abnormal) and her pulse is 89. Her respiration is 16 and oxygen saturation is 100%. Physical Exam  Vitals signs reviewed. Constitutional:       General: She is not in acute distress. HENT:      Head: Normocephalic and atraumatic.       Ears:      Comments: Hearing grossly normal     Nose: Nose normal.      Mouth/Throat:      Mouth: Mucous membranes are moist.      Pharynx: Oropharynx is clear. Eyes:      General: No scleral icterus. Conjunctiva/sclera: Conjunctivae normal.      Pupils: Pupils are equal, round, and reactive to light. Neck:      Musculoskeletal: No muscular tenderness. Cardiovascular:      Rate and Rhythm: Normal rate and regular rhythm. Pulses: Normal pulses. Pulmonary:      Effort: Pulmonary effort is normal. No respiratory distress. Breath sounds: Normal breath sounds. Abdominal:      General: There is no distension. Tenderness: There is no abdominal tenderness. There is no guarding. Musculoskeletal:      Right lower leg: No edema. Left lower leg: No edema. Skin:     General: Skin is warm and dry. Capillary Refill: Capillary refill takes less than 2 seconds. Comments: Left distal fourth digit swollen with wound as pictured. Radial pulse intact. Good cap refill. Distal sensation intact. Neurological:      General: No focal deficit present. Mental Status: She is alert and oriented to person, place, and time. Mental status is at baseline. DIFFERENTIAL DIAGNOSIS/MDM:     Patient admitted for left fourth digit pain for several weeks status post felon drainage. Noncompliant with outpatient antibiotic regimen. Failed outpatient management of pain. Admitted for symptom control. ID following, antibiotics started per recommendations. X-ray concerning for signs of osteomyelitis, MRI pending. Admitted for symptom control, antibiotics, possible hand consultation.     DIAGNOSTIC RESULTS     RADIOLOGY:   I directly visualized the following  images and reviewed the radiologist interpretations:    Xr Hand Left (min 3 Views)    Result Date: 3/11/2021  EXAMINATION: THREE XRAY VIEWS OF THE LEFT HAND 3/11/2021 4:29 pm COMPARISON: Three-view study of the left hand from 02/23/2021 HISTORY: ORDERING SYSTEM PROVIDED HISTORY: 4th digit distal tip infection TECHNOLOGIST PROVIDED HISTORY: 4th digit distal tip infection FINDINGS: Interval worsening soft tissue swelling tip 4th digit, now with contour irregularity; in addition, along anterior aspect distal phalanx, cortical erosion/adjacent bone or periosteal new bone now identified, suspicious for osteomyelitis. No soft tissue gas or radiopaque FB Again noted are interphalangeal degenerative changes, especially DIPs and 5th metacarpal phalangeal joint; calcium deposit lateral to the 2nd DIP again noted. Mild degenerative changes triscaphe joints. Vascular calcifications. Swelling tip 4th digit compatible with the history of infection, with findings concerning for osteomyelitis involving the palmar aspect DP. No radiopaque foreign body. Similar degenerative changes. Vascular calcifications. LABS:  I have reviewed and interpreted all available lab results.   Labs Reviewed   CBC WITH AUTO DIFFERENTIAL - Abnormal; Notable for the following components:       Result Value    RBC 3.11 (*)     Hemoglobin 9.7 (*)     Hematocrit 31.9 (*)     All other components within normal limits   BASIC METABOLIC PANEL W/ REFLEX TO MG FOR LOW K - Abnormal; Notable for the following components:    Glucose 102 (*)     All other components within normal limits   C-REACTIVE PROTEIN - Abnormal; Notable for the following components:    CRP 6.1 (*)     All other components within normal limits   SEDIMENTATION RATE - Abnormal; Notable for the following components:    Sed Rate 102 (*)     All other components within normal limits   CBC WITH AUTO DIFFERENTIAL - Abnormal; Notable for the following components:    RBC 2.81 (*)     Hemoglobin 8.8 (*)     Hematocrit 29.6 (*)     .3 (*)     Monocytes 14 (*)     All other components within normal limits   BASIC METABOLIC PANEL W/ REFLEX TO MG FOR LOW K - Abnormal; Notable for the following components:    Glucose 203 (*) All other components within normal limits   POC GLUCOSE FINGERSTICK - Abnormal; Notable for the following components:    POC Glucose 208 (*)     All other components within normal limits   IMMATURE PLATELET FRACTION       SCREENING TOOLS:    HEART Risk Score for Chest Pain Patients   History and Physical Exam Suspicion Level  (Nausea, Vomiting, Diaphoresis, Radiation, Exertion)   Slightly Suspicious (0 pts)   Moderately Suspicious (1 pt)   Highly Suspicious (2 pts)   EKG Interpretation   Normal (0 pts)   Non-Specific Repolarization Disturbance (1 pt)   Significant ST-Depression (2 pts)   Age of Patient (in years)   = 39 (0 pts)   46-64 (1 pt)   = 65 (2 pts)   Risk Factors   No Risk Factors (0 pts)   1-2 Risk Factors (1 pt)   = 3 Risk Factors (2 pts)   Risk Factors Include:   Hypercholesterolemia   Hypertension   Diabetes Mellitus   Cigarette smoking   Positive family history   Obesity   CAD   (SLE, CKDz, HIV, Cocaine abuse)   Troponin Levels   = Normal Limit (0 pts)   1-3 Times Normal Limit (1 pt)   > 3 Times Normal Limit (2 pts)  TOTAL:    Percent Risk for Major Adverse Cardiac Event (MACE)  0-3 pts indicates low risk for MACE   2.5% (DISCHARGE)   4-7 pts indicates moderate risk for MACE  20.3% (OBS)  8-10 pts indicates high risk for MACE  72.7% (EARLY INVASIVE [place])    CDU IMPRESSION / Kain Garcia is a 50 y.o. female who presents with left fourth digit pain status post felon drainage, has been noncompliant with outpatient antibiotics. Failed outpatient management of pain. ED work-up demonstrated x-ray concerning for signs of osteomyelitis. MRI pending. ID consulted, empiric antibiotic started. Possible hand consultation.     · Left fourth digit pain  · S/p felon drainage  · ID consulted, ceftaroline and flagyl ongoing  · MRI pending  · May indicate hand consultation  · Trend inflammatory markers  · CRP 6.1  ·   · Home lantus, metformin, currently NPO since midnight  · Continue home medications and pain control  · Monitor vitals, labs, and imaging  · DISPO: pending consults and clinical improvement    CONSULTS:    IP CONSULT TO INFECTIOUS DISEASES    PROCEDURES:  Not indicated       PATIENT REFERRED TO:    No follow-up provider specified. --  Deandre Butts DO   Emergency Medicine Resident     This dictation was generated by voice recognition computer software. Although all attempts are made to edit the dictation for accuracy, there may be errors in the transcription that are not intended.

## 2021-03-12 NOTE — ED NOTES
Spoke with Jori Javier RN at [facility] and updated on pt status. All questions answered.      Britton Daniel RN  03/11/21 [id number]

## 2021-03-12 NOTE — ED NOTES
Pt provided boxed lunch and diet deny mist.  Pt talking on phone. White board updated. NAD noted. Pt still does not have IV access, Lore (Medic) at bedside with US. Pt respirations are even and unlabored, pt is oriented X 4, speaking in complete sentences, bed is in the lowest position, call light is within reach. Will continue to monitor.        Evie Woods RN  03/11/21 Riaz Pozo RN  03/11/21 [id number]

## 2021-03-13 ENCOUNTER — ANESTHESIA EVENT (OUTPATIENT)
Dept: OPERATING ROOM | Age: 56
DRG: 952 | End: 2021-03-13
Payer: COMMERCIAL

## 2021-03-13 ENCOUNTER — ANESTHESIA (OUTPATIENT)
Dept: OPERATING ROOM | Age: 56
DRG: 952 | End: 2021-03-13
Payer: COMMERCIAL

## 2021-03-13 VITALS — OXYGEN SATURATION: 100 % | SYSTOLIC BLOOD PRESSURE: 96 MMHG | DIASTOLIC BLOOD PRESSURE: 71 MMHG

## 2021-03-13 LAB
ABSOLUTE EOS #: 0.2 K/UL (ref 0–0.44)
ABSOLUTE IMMATURE GRANULOCYTE: <0.03 K/UL (ref 0–0.3)
ABSOLUTE LYMPH #: 1.53 K/UL (ref 1.1–3.7)
ABSOLUTE MONO #: 0.58 K/UL (ref 0.1–1.2)
BASOPHILS # BLD: 1 % (ref 0–2)
BASOPHILS ABSOLUTE: 0.04 K/UL (ref 0–0.2)
C-REACTIVE PROTEIN: 4.2 MG/L (ref 0–5)
DIFFERENTIAL TYPE: ABNORMAL
EOSINOPHILS RELATIVE PERCENT: 4 % (ref 1–4)
GLUCOSE BLD-MCNC: 154 MG/DL (ref 65–105)
GLUCOSE BLD-MCNC: 175 MG/DL (ref 65–105)
GLUCOSE BLD-MCNC: 181 MG/DL (ref 65–105)
GLUCOSE BLD-MCNC: 196 MG/DL (ref 65–105)
GLUCOSE BLD-MCNC: 261 MG/DL (ref 65–105)
GLUCOSE BLD-MCNC: 322 MG/DL (ref 65–105)
HCT VFR BLD CALC: 29.5 % (ref 36.3–47.1)
HEMOGLOBIN: 8.9 G/DL (ref 11.9–15.1)
IMMATURE GRANULOCYTES: 0 %
LYMPHOCYTES # BLD: 34 % (ref 24–43)
MCH RBC QN AUTO: 31.2 PG (ref 25.2–33.5)
MCHC RBC AUTO-ENTMCNC: 30.2 G/DL (ref 28.4–34.8)
MCV RBC AUTO: 103.5 FL (ref 82.6–102.9)
MONOCYTES # BLD: 13 % (ref 3–12)
NRBC AUTOMATED: 0 PER 100 WBC
PDW BLD-RTO: 14 % (ref 11.8–14.4)
PLATELET # BLD: 304 K/UL (ref 138–453)
PLATELET ESTIMATE: ABNORMAL
PMV BLD AUTO: 10 FL (ref 8.1–13.5)
RBC # BLD: 2.85 M/UL (ref 3.95–5.11)
RBC # BLD: ABNORMAL 10*6/UL
SARS-COV-2, RAPID: NOT DETECTED
SEDIMENTATION RATE, ERYTHROCYTE: 55 MM (ref 0–30)
SEG NEUTROPHILS: 48 % (ref 36–65)
SEGMENTED NEUTROPHILS ABSOLUTE COUNT: 2.13 K/UL (ref 1.5–8.1)
SPECIMEN DESCRIPTION: NORMAL
WBC # BLD: 4.5 K/UL (ref 3.5–11.3)
WBC # BLD: ABNORMAL 10*3/UL

## 2021-03-13 PROCEDURE — 6360000002 HC RX W HCPCS: Performed by: NURSE ANESTHETIST, CERTIFIED REGISTERED

## 2021-03-13 PROCEDURE — 2500000003 HC RX 250 WO HCPCS: Performed by: PLASTIC SURGERY

## 2021-03-13 PROCEDURE — 6370000000 HC RX 637 (ALT 250 FOR IP): Performed by: STUDENT IN AN ORGANIZED HEALTH CARE EDUCATION/TRAINING PROGRAM

## 2021-03-13 PROCEDURE — 2580000003 HC RX 258: Performed by: PLASTIC SURGERY

## 2021-03-13 PROCEDURE — 1200000000 HC SEMI PRIVATE

## 2021-03-13 PROCEDURE — 3600000003 HC SURGERY LEVEL 3 BASE: Performed by: PLASTIC SURGERY

## 2021-03-13 PROCEDURE — 36415 COLL VENOUS BLD VENIPUNCTURE: CPT

## 2021-03-13 PROCEDURE — 0X6T0Z3 DETACHMENT AT LEFT RING FINGER, LOW, OPEN APPROACH: ICD-10-PCS | Performed by: PLASTIC SURGERY

## 2021-03-13 PROCEDURE — 85652 RBC SED RATE AUTOMATED: CPT

## 2021-03-13 PROCEDURE — U0002 COVID-19 LAB TEST NON-CDC: HCPCS

## 2021-03-13 PROCEDURE — 85025 COMPLETE CBC W/AUTO DIFF WBC: CPT

## 2021-03-13 PROCEDURE — 2580000003 HC RX 258: Performed by: STUDENT IN AN ORGANIZED HEALTH CARE EDUCATION/TRAINING PROGRAM

## 2021-03-13 PROCEDURE — 6360000002 HC RX W HCPCS: Performed by: STUDENT IN AN ORGANIZED HEALTH CARE EDUCATION/TRAINING PROGRAM

## 2021-03-13 PROCEDURE — 3600000013 HC SURGERY LEVEL 3 ADDTL 15MIN: Performed by: PLASTIC SURGERY

## 2021-03-13 PROCEDURE — 6370000000 HC RX 637 (ALT 250 FOR IP): Performed by: PLASTIC SURGERY

## 2021-03-13 PROCEDURE — 6360000002 HC RX W HCPCS: Performed by: PLASTIC SURGERY

## 2021-03-13 PROCEDURE — 99232 SBSQ HOSP IP/OBS MODERATE 35: CPT | Performed by: INTERNAL MEDICINE

## 2021-03-13 PROCEDURE — 86140 C-REACTIVE PROTEIN: CPT

## 2021-03-13 PROCEDURE — 2709999900 HC NON-CHARGEABLE SUPPLY: Performed by: PLASTIC SURGERY

## 2021-03-13 PROCEDURE — 88305 TISSUE EXAM BY PATHOLOGIST: CPT

## 2021-03-13 PROCEDURE — 6360000002 HC RX W HCPCS: Performed by: ANESTHESIOLOGY

## 2021-03-13 PROCEDURE — 7100000000 HC PACU RECOVERY - FIRST 15 MIN: Performed by: PLASTIC SURGERY

## 2021-03-13 PROCEDURE — 7100000001 HC PACU RECOVERY - ADDTL 15 MIN: Performed by: PLASTIC SURGERY

## 2021-03-13 PROCEDURE — 6370000000 HC RX 637 (ALT 250 FOR IP): Performed by: INTERNAL MEDICINE

## 2021-03-13 PROCEDURE — 94640 AIRWAY INHALATION TREATMENT: CPT

## 2021-03-13 PROCEDURE — 82947 ASSAY GLUCOSE BLOOD QUANT: CPT

## 2021-03-13 PROCEDURE — 6370000000 HC RX 637 (ALT 250 FOR IP): Performed by: EMERGENCY MEDICINE

## 2021-03-13 PROCEDURE — 88311 DECALCIFY TISSUE: CPT

## 2021-03-13 PROCEDURE — 3700000001 HC ADD 15 MINUTES (ANESTHESIA): Performed by: PLASTIC SURGERY

## 2021-03-13 PROCEDURE — 2500000003 HC RX 250 WO HCPCS: Performed by: NURSE ANESTHETIST, CERTIFIED REGISTERED

## 2021-03-13 PROCEDURE — 3700000000 HC ANESTHESIA ATTENDED CARE: Performed by: PLASTIC SURGERY

## 2021-03-13 RX ORDER — PROPOFOL 10 MG/ML
INJECTION, EMULSION INTRAVENOUS PRN
Status: DISCONTINUED | OUTPATIENT
Start: 2021-03-13 | End: 2021-03-13 | Stop reason: SDUPTHER

## 2021-03-13 RX ORDER — DEXAMETHASONE SODIUM PHOSPHATE 4 MG/ML
INJECTION, SOLUTION INTRA-ARTICULAR; INTRALESIONAL; INTRAMUSCULAR; INTRAVENOUS; SOFT TISSUE PRN
Status: DISCONTINUED | OUTPATIENT
Start: 2021-03-13 | End: 2021-03-13 | Stop reason: SDUPTHER

## 2021-03-13 RX ORDER — OXYCODONE HYDROCHLORIDE 5 MG/1
5 TABLET ORAL EVERY 4 HOURS PRN
Status: DISCONTINUED | OUTPATIENT
Start: 2021-03-13 | End: 2021-03-13

## 2021-03-13 RX ORDER — OXYCODONE HYDROCHLORIDE 5 MG/1
7.5 TABLET ORAL EVERY 6 HOURS PRN
Status: DISCONTINUED | OUTPATIENT
Start: 2021-03-13 | End: 2021-03-14

## 2021-03-13 RX ORDER — DEXTROSE MONOHYDRATE 25 G/50ML
12.5 INJECTION, SOLUTION INTRAVENOUS PRN
Status: DISCONTINUED | OUTPATIENT
Start: 2021-03-13 | End: 2021-03-15 | Stop reason: HOSPADM

## 2021-03-13 RX ORDER — MORPHINE SULFATE 2 MG/ML
2 INJECTION, SOLUTION INTRAMUSCULAR; INTRAVENOUS EVERY 4 HOURS PRN
Status: COMPLETED | OUTPATIENT
Start: 2021-03-13 | End: 2021-03-14

## 2021-03-13 RX ORDER — DEXTROSE MONOHYDRATE 50 MG/ML
100 INJECTION, SOLUTION INTRAVENOUS PRN
Status: DISCONTINUED | OUTPATIENT
Start: 2021-03-13 | End: 2021-03-15 | Stop reason: HOSPADM

## 2021-03-13 RX ORDER — MAGNESIUM HYDROXIDE 1200 MG/15ML
LIQUID ORAL CONTINUOUS PRN
Status: COMPLETED | OUTPATIENT
Start: 2021-03-13 | End: 2021-03-13

## 2021-03-13 RX ORDER — BUPIVACAINE HYDROCHLORIDE 2.5 MG/ML
INJECTION, SOLUTION INFILTRATION; PERINEURAL PRN
Status: DISCONTINUED | OUTPATIENT
Start: 2021-03-13 | End: 2021-03-13 | Stop reason: HOSPADM

## 2021-03-13 RX ORDER — SODIUM CHLORIDE, SODIUM LACTATE, POTASSIUM CHLORIDE, CALCIUM CHLORIDE 600; 310; 30; 20 MG/100ML; MG/100ML; MG/100ML; MG/100ML
INJECTION, SOLUTION INTRAVENOUS CONTINUOUS
Status: DISCONTINUED | OUTPATIENT
Start: 2021-03-13 | End: 2021-03-13

## 2021-03-13 RX ORDER — NICOTINE POLACRILEX 4 MG
15 LOZENGE BUCCAL PRN
Status: DISCONTINUED | OUTPATIENT
Start: 2021-03-13 | End: 2021-03-15 | Stop reason: HOSPADM

## 2021-03-13 RX ORDER — ONDANSETRON 2 MG/ML
INJECTION INTRAMUSCULAR; INTRAVENOUS PRN
Status: DISCONTINUED | OUTPATIENT
Start: 2021-03-13 | End: 2021-03-13 | Stop reason: SDUPTHER

## 2021-03-13 RX ORDER — FENTANYL CITRATE 50 UG/ML
INJECTION, SOLUTION INTRAMUSCULAR; INTRAVENOUS PRN
Status: DISCONTINUED | OUTPATIENT
Start: 2021-03-13 | End: 2021-03-13 | Stop reason: SDUPTHER

## 2021-03-13 RX ORDER — LIDOCAINE HYDROCHLORIDE 10 MG/ML
INJECTION, SOLUTION EPIDURAL; INFILTRATION; INTRACAUDAL; PERINEURAL PRN
Status: DISCONTINUED | OUTPATIENT
Start: 2021-03-13 | End: 2021-03-13 | Stop reason: SDUPTHER

## 2021-03-13 RX ORDER — PHENYLEPHRINE HYDROCHLORIDE 10 MG/ML
INJECTION INTRAVENOUS PRN
Status: DISCONTINUED | OUTPATIENT
Start: 2021-03-13 | End: 2021-03-13 | Stop reason: SDUPTHER

## 2021-03-13 RX ADMIN — DOCUSATE SODIUM 100 MG: 100 CAPSULE, LIQUID FILLED ORAL at 20:46

## 2021-03-13 RX ADMIN — SODIUM CHLORIDE: 9 INJECTION, SOLUTION INTRAVENOUS at 12:12

## 2021-03-13 RX ADMIN — ONDANSETRON 4 MG: 4 TABLET, ORALLY DISINTEGRATING ORAL at 08:57

## 2021-03-13 RX ADMIN — PROPOFOL 170 MG: 10 INJECTION, EMULSION INTRAVENOUS at 12:39

## 2021-03-13 RX ADMIN — OXYCODONE HYDROCHLORIDE 5 MG: 5 TABLET ORAL at 16:27

## 2021-03-13 RX ADMIN — PANTOPRAZOLE SODIUM 40 MG: 40 TABLET, DELAYED RELEASE ORAL at 14:44

## 2021-03-13 RX ADMIN — BUDESONIDE AND FORMOTEROL FUMARATE DIHYDRATE 2 PUFF: 80; 4.5 AEROSOL RESPIRATORY (INHALATION) at 08:33

## 2021-03-13 RX ADMIN — LIDOCAINE HYDROCHLORIDE 50 MG: 10 INJECTION, SOLUTION EPIDURAL; INFILTRATION; INTRACAUDAL; PERINEURAL at 12:39

## 2021-03-13 RX ADMIN — TRAZODONE HYDROCHLORIDE 150 MG: 100 TABLET ORAL at 20:46

## 2021-03-13 RX ADMIN — ACETAMINOPHEN 1000 MG: 500 TABLET ORAL at 01:08

## 2021-03-13 RX ADMIN — HYDROMORPHONE HYDROCHLORIDE 0.5 MG: 1 INJECTION, SOLUTION INTRAMUSCULAR; INTRAVENOUS; SUBCUTANEOUS at 13:40

## 2021-03-13 RX ADMIN — DEXAMETHASONE SODIUM PHOSPHATE 4 MG: 4 INJECTION, SOLUTION INTRAMUSCULAR; INTRAVENOUS at 12:57

## 2021-03-13 RX ADMIN — BUDESONIDE AND FORMOTEROL FUMARATE DIHYDRATE 2 PUFF: 80; 4.5 AEROSOL RESPIRATORY (INHALATION) at 20:33

## 2021-03-13 RX ADMIN — OXYCODONE HYDROCHLORIDE 7.5 MG: 5 TABLET ORAL at 23:01

## 2021-03-13 RX ADMIN — METRONIDAZOLE 500 MG: 500 TABLET ORAL at 16:25

## 2021-03-13 RX ADMIN — IBUPROFEN 400 MG: 400 TABLET, FILM COATED ORAL at 16:25

## 2021-03-13 RX ADMIN — SODIUM CHLORIDE: 9 INJECTION, SOLUTION INTRAVENOUS at 09:02

## 2021-03-13 RX ADMIN — ONDANSETRON 4 MG: 4 TABLET, ORALLY DISINTEGRATING ORAL at 01:43

## 2021-03-13 RX ADMIN — MORPHINE SULFATE 2 MG: 2 INJECTION, SOLUTION INTRAMUSCULAR; INTRAVENOUS at 17:49

## 2021-03-13 RX ADMIN — VENLAFAXINE HYDROCHLORIDE 150 MG: 150 CAPSULE, EXTENDED RELEASE ORAL at 14:44

## 2021-03-13 RX ADMIN — GABAPENTIN 900 MG: 300 CAPSULE ORAL at 20:46

## 2021-03-13 RX ADMIN — Medication 3 MG: at 20:46

## 2021-03-13 RX ADMIN — INSULIN LISPRO 2 UNITS: 100 INJECTION, SOLUTION INTRAVENOUS; SUBCUTANEOUS at 21:36

## 2021-03-13 RX ADMIN — METRONIDAZOLE 500 MG: 500 TABLET ORAL at 01:08

## 2021-03-13 RX ADMIN — MORPHINE SULFATE 2 MG: 2 INJECTION, SOLUTION INTRAMUSCULAR; INTRAVENOUS at 21:57

## 2021-03-13 RX ADMIN — ACETAMINOPHEN 1000 MG: 500 TABLET ORAL at 20:46

## 2021-03-13 RX ADMIN — CEFTAROLINE FOSAMIL 600 MG: 600 POWDER, FOR SOLUTION INTRAVENOUS at 16:50

## 2021-03-13 RX ADMIN — INSULIN LISPRO 4 UNITS: 100 INJECTION, SOLUTION INTRAVENOUS; SUBCUTANEOUS at 16:59

## 2021-03-13 RX ADMIN — OXYCODONE HYDROCHLORIDE 10 MG: 5 TABLET ORAL at 06:41

## 2021-03-13 RX ADMIN — ONDANSETRON 4 MG: 2 INJECTION INTRAMUSCULAR; INTRAVENOUS at 12:57

## 2021-03-13 RX ADMIN — PHENYLEPHRINE HYDROCHLORIDE 150 MCG: 10 INJECTION INTRAVENOUS at 13:10

## 2021-03-13 RX ADMIN — FENTANYL CITRATE 50 MCG: 50 INJECTION, SOLUTION INTRAMUSCULAR; INTRAVENOUS at 12:36

## 2021-03-13 RX ADMIN — INSULIN GLARGINE 15 UNITS: 100 INJECTION, SOLUTION SUBCUTANEOUS at 21:36

## 2021-03-13 RX ADMIN — CEFTAROLINE FOSAMIL 600 MG: 600 POWDER, FOR SOLUTION INTRAVENOUS at 04:46

## 2021-03-13 RX ADMIN — IBUPROFEN 400 MG: 400 TABLET, FILM COATED ORAL at 20:46

## 2021-03-13 ASSESSMENT — PULMONARY FUNCTION TESTS
PIF_VALUE: 12
PIF_VALUE: 10
PIF_VALUE: 12
PIF_VALUE: 11
PIF_VALUE: 13
PIF_VALUE: 13
PIF_VALUE: 12
PIF_VALUE: 2
PIF_VALUE: 13
PIF_VALUE: 1
PIF_VALUE: 12
PIF_VALUE: 12
PIF_VALUE: 3
PIF_VALUE: 11
PIF_VALUE: 21
PIF_VALUE: 11
PIF_VALUE: 14
PIF_VALUE: 12
PIF_VALUE: 12
PIF_VALUE: 14
PIF_VALUE: 12
PIF_VALUE: 12
PIF_VALUE: 1
PIF_VALUE: 2

## 2021-03-13 ASSESSMENT — PAIN SCALES - GENERAL
PAINLEVEL_OUTOF10: 10
PAINLEVEL_OUTOF10: 8
PAINLEVEL_OUTOF10: 10
PAINLEVEL_OUTOF10: 0
PAINLEVEL_OUTOF10: 10
PAINLEVEL_OUTOF10: 10
PAINLEVEL_OUTOF10: 8
PAINLEVEL_OUTOF10: 8
PAINLEVEL_OUTOF10: 6

## 2021-03-13 ASSESSMENT — ENCOUNTER SYMPTOMS
COLOR CHANGE: 1
CHOKING: 0
ABDOMINAL DISTENTION: 0
EYE DISCHARGE: 0
APNEA: 0

## 2021-03-13 ASSESSMENT — PAIN - FUNCTIONAL ASSESSMENT: PAIN_FUNCTIONAL_ASSESSMENT: 0-10

## 2021-03-13 NOTE — ANESTHESIA PRE PROCEDURE
Department of Anesthesiology  Preprocedure Note       Name:  Kriss Wakefield   Age:  50 y.o.  :  1965                                          MRN:  [id number]         Date:  3/13/2021      Surgeon: Joseluis Kaur):  Laura Ken MD    Procedure: Procedure(s):  LEFT RING FINER AMPUTATION    Medications prior to admission:   Prior to Admission medications    Medication Sig Start Date End Date Taking?  Authorizing Provider   metroNIDAZOLE (FLAGYL) 500 MG tablet Take 1 tablet by mouth 3 times daily for 14 days 3/1/21 3/15/21 Yes Shantell Lindsay MD   venlafaxine (EFFEXOR XR) 150 MG extended release capsule Take 1 capsule by mouth daily (with breakfast) 2/10/21  Yes Arleth Arriaga MD   insulin glargine (LANTUS SOLOSTAR) 100 UNIT/ML injection pen Inject 15 Units into the skin nightly 21  Yes Arleth Arriaga MD   cetirizine (ZYRTEC) 10 MG tablet Take 1 tablet by mouth daily 21  Yes Alejandra Horton MD   gabapentin (NEURONTIN) 300 MG capsule TAKE 3 CAPSULES 900 MG BY MOUTH THREE TIMES DAILY 12/9/20 3/11/21 Yes Alejandra Horton MD    MG capsule TAKE 1 CAPSULE BY MOUTH TWICE DAILY 20  Yes Alejandra Horton MD   acetaminophen (TYLENOL) 325 MG tablet TAKE 2 TABLETS BY MOUTH EVERY 6 HOURS AS NEEDED FOR PAIN 20  Yes Alejandra Horton MD   metFORMIN (GLUCOPHAGE) 1000 MG tablet TAKE 1 TABLET BY MOUTH DAILY WITH BREAKFAST 20  Yes Alejandra Horton MD   traZODone (DESYREL) 150 MG tablet Take 1 tablet by mouth nightly 20  Yes Alejandra Horton MD   omeprazole (PRILOSEC) 20 MG delayed release capsule Take 1 capsule by mouth Daily 20  Yes Alejandra Horton MD   albuterol sulfate  (90 Base) MCG/ACT inhaler Inhale 2 puffs into the lungs every 4 hours as needed for Wheezing 10/20/20 3/11/21 Yes Alejandra Horton MD   FLOVENT  MCG/ACT inhaler Inhale 2 puffs into the lungs 2 times daily 10/20/20  Yes Alejandra Horton MD   budesonide-formoterol (SYMBICORT) 80-4.5 MCG/ACT disintegrating tablet 4 mg  4 mg Oral Q6H PRN Kishor Bowen MD   4 mg at 03/13/21 0857    [MAR Hold] oxyCODONE (ROXICODONE) immediate release tablet 10 mg  10 mg Oral Q6H PRN Marti Shipley MD   10 mg at 03/13/21 0641    [MAR Hold] sodium chloride flush 0.9 % injection 10 mL  10 mL Intravenous PRN Cherie Barrett MD        [facility] Hold] gabapentin (NEURONTIN) capsule 900 mg  900 mg Oral TID LAILA Jolley - CNP   900 mg at 03/12/21 2128    [MAR Hold] metroNIDAZOLE (FLAGYL) tablet 500 mg  500 mg Oral 3 times per day Cherie Barrett MD   500 mg at 03/13/21 0108    [MAR Hold] ceftaroline fosamil (TEFLARO) 600 mg in dextrose 5 % 50 mL IVPB  600 mg Intravenous Q12H Cherie Barrett MD   Stopped at 03/13/21 0546    [MAR Hold] ARIPiprazole (ABILIFY) tablet 10 mg  10 mg Oral Daily Cherie Barrett MD   10 mg at 03/12/21 1156    [MAR Hold] budesonide-formoterol (SYMBICORT) 80-4.5 MCG/ACT inhaler 2 puff  2 puff Inhalation BID Cherie Barrett MD   2 puff at 03/13/21 0833    [MAR Hold] docusate sodium (COLACE) capsule 100 mg  100 mg Oral BID Cherie Barrett MD   100 mg at 03/12/21 2021    [MAR Hold] FLUoxetine (PROZAC) capsule 60 mg  60 mg Oral Daily Cherie Barrett MD   Stopped at 03/12/21 0920    [MAR Hold] insulin glargine (LANTUS) injection vial 15 Units  15 Units Subcutaneous Nightly Cherie Barrett MD   15 Units at 03/12/21 2129    [MAR Hold] melatonin tablet 3 mg  3 mg Oral Nightly PRN Cherie Barrett MD   3 mg at 03/11/21 2312    [MAR Hold] metFORMIN (GLUCOPHAGE) tablet 1,000 mg  1,000 mg Oral Daily with breakfast Cherie Barrett MD        [facility] Hold] pantoprazole (PROTONIX) tablet 40 mg  40 mg Oral QAM AC Cherie Barrett MD   40 mg at 03/12/21 0603    [MAR Hold] alogliptin (NESINA) tablet 25 mg  25 mg Oral Daily Cherie Barrett MD        [facility] Hold] traZODone (DESYREL) tablet 150 mg  150 mg Oral Nightly Cherie Barrett MD   150 mg at 03/12/21 2021    [MAR Hold] venlafaxine (EFFEXOR XR) extended release capsule 150 mg  150 mg Oral Daily with breakfast Ne Land MD   150 mg at 03/12/21 1155    [MAR Hold] sodium chloride flush 0.9 % injection 10 mL  10 mL Intravenous 2 times per day Ne Land MD   10 mL at 03/11/21 2307    [MAR Hold] sodium chloride flush 0.9 % injection 10 mL  10 mL Intravenous PRN Ne Land MD        [facility] Hold] enoxaparin (LOVENOX) injection 40 mg  40 mg Subcutaneous Daily Ne Land MD        [facility] Hold] acetaminophen (TYLENOL) tablet 1,000 mg  1,000 mg Oral 3 times per day Ne Land MD   1,000 mg at 03/13/21 0108    [MAR Hold] 0.9 % sodium chloride infusion   Intravenous Continuous Ne Land MD 75 mL/hr at 03/13/21 0902 New Bag at 03/13/21 0902    [MAR Hold] ibuprofen (ADVIL;MOTRIN) tablet 400 mg  400 mg Oral 4x Daily WC Ne Land MD   400 mg at 03/12/21 2021       Allergies: Allergies   Allergen Reactions    Bactrim [Sulfamethoxazole-Trimethoprim] Swelling    Adhesive Tape Rash       Problem List:    Patient Active Problem List   Diagnosis Code    IDDM (insulin dependent diabetes mellitus) [id number]    Lupus ([facility] M32.9    Bipolar disorder, mixed ([facility] F31.60    Post traumatic stress disorder (PTSD) F43.10    Acute cystitis with hematuria N30.01    Hyperglycemia R73.9    Suicidal ideation R45.851    Arthritis M19.90    Chronic back pain M54.9, G89.29    Acid reflux K21.9    History of stroke Z86.73    Polysubstance abuse ([facility] F19.10    Bipolar 1 disorder ([facility]) F31.9    Cocaine abuse ([facility] F14.10    Chest pain R07.9    Acute non-recurrent maxillary sinusitis J01.00    Acute respiratory failure with hypercapnia ([facility]) J96.02    Alcohol use disorder, severe, dependence ([facility]) F10.20    Asthma exacerbation attacks J45. 901    Bilateral edema of lower extremity R60.0    Bipolar 1 disorder, depressed, severe ([facility]) F31.4    BMI 34.0-34.9,adult Z68.34    Cannabis use disorder, severe, dependence (HCC) F12.20    Cocaine use disorder, severe, dependence ([address] F14.20    Dizziness R42    Dyslipidemia E78.5    Family history of colon cancer Z80.0    History of lupus [id number]    Homicidal ideation R45.850    Hypotension I95.9    Neuropathy G62.9    Normocytic anemia D64.9    Recurrent depression (HCC) F33.9    Recurrent major depressive disorder, in partial remission ([address] F33.41    Severe recurrent major depression with psychotic features ([facility]) F33.3    Severe recurrent major depression without psychotic features ([address] F33.2    Upper GI bleed K92.2    Vitamin D deficiency E55.9    White matter changes [id number]    Gastroesophageal reflux disease K21.9    Stroke (cerebrum) ([facility]) I63.9    Rib lesion M89.9    Type 2 diabetes mellitus ([facility]) E11.9    YAEL (generalized anxiety disorder) F41.1    Posttraumatic stress disorder F43.10    Suicidal intent R45.851    Leg weakness, bilateral R29.898    Poorly controlled diabetes mellitus ([facility]) E11.65    Acute kidney injury ([address] N17.9    Felon of finger L03.019    Osteomyelitis ([address] M86.9       Past Medical History:        Diagnosis Date    Acid reflux 5/29/2017    Acute cystitis with hematuria 10/11/2016    Acute non-recurrent maxillary sinusitis 11/28/2017    Asthma     Bipolar 1 disorder ([address] 1/4/2018    Bipolar disorder, mixed ([address]     BMI 34.0-34.9,adult 11/28/2017    Cannabis use disorder, severe, dependence ([address] 12/19/2017    Cerebrovascular accident (CVA) ([address] 6/14/2017    Chest pain 11/5/2016    Chronic renal insufficiency     Cocaine abuse ([address] 1/5/2018    DDD (degenerative disc disease), cervical     Diabetes mellitus ([address]     Dizziness 6/13/2017    Fibromyalgia     History of stroke 9/9/2017    Homicidal ideation 11/6/2017    Hyperglycemia     Hypertension     Hypotension 1/18/2019    IDDM (insulin dependent diabetes mellitus) 12/21/2015    Lupus ([address]     Migraine     Neuropathy     Neuropathy     Polysubstance abuse ([address] 9/17/2017  Post traumatic stress disorder (PTSD)     Posttraumatic stress disorder 5/29/2017    Recurrent depression ([facility] 5/29/2017    Recurrent major depressive disorder, in partial remission ([facility] 11/28/2017    Screening mammogram, encounter for 11/28/2017    Severe recurrent major depression with psychotic features ([facility] 12/19/2017    Severe recurrent major depression without psychotic features ([facility] 12/19/2017    Stroke (cerebrum) ([facility] 6/14/2017    Stroke ([facility]     per chart notes    Suicidal ideation     Suicidal intent 3/10/2017    Vitamin D deficiency 11/28/2017    White matter changes 6/13/2017       Past Surgical History:        Procedure Laterality Date    ABDOMEN SURGERY      drain tube    ABSCESS DRAINAGE      right buttock    CATARACT REMOVAL WITH IMPLANT Bilateral     CHEST TUBE INSERTION      LASIK Bilateral        Social History:    Social History     Tobacco Use    Smoking status: Never Smoker    Smokeless tobacco: Never Used   Substance Use Topics    Alcohol use: Not Currently                                Counseling given: Not Answered      Vital Signs (Current):   Vitals:    03/13/21 0800 03/13/21 1126 03/13/21 1150 03/13/21 1152   BP: 126/84  (!) 123/57    Pulse: 91  85    Resp: 18  16    Temp: 97 °F (36.1 °C)  98.1 °F (36.7 °C)    TempSrc: Oral  Temporal    SpO2: 94%  93% 96%   Weight:  250 lb (113.4 kg)     Height:  5' 6\" (1.676 m)                                                BP Readings from Last 3 Encounters:   03/13/21 (!) 123/57   03/01/21 110/66   02/25/21 110/75       NPO Status: Time of last liquid consumption: 2359                        Time of last solid consumption: 2359                        Date of last liquid consumption: 03/12/21                        Date of last solid food consumption: 03/12/21    BMI:   Wt Readings from Last 3 Encounters:   03/13/21 250 lb (113.4 kg)   03/01/21 250 lb (113.4 kg)   02/23/21 250 lb (113.4 kg)     Body mass index is 40.35 kg/m².    CBC:   Lab Results   Component Value Date    WBC 4.5 03/13/2021    RBC 2.85 03/13/2021    HGB 8.9 03/13/2021    HCT 29.5 03/13/2021    .5 03/13/2021    RDW 14.0 03/13/2021     03/13/2021       CMP:   Lab Results   Component Value Date     03/12/2021    K 4.1 03/12/2021     03/12/2021    CO2 22 03/12/2021    BUN 12 03/12/2021    CREATININE 0.75 03/12/2021    GFRAA >60 03/12/2021    LABGLOM >60 03/12/2021    GLUCOSE 203 03/12/2021    PROT 8.1 02/07/2021    CALCIUM 8.7 03/12/2021    BILITOT 0.30 02/07/2021    ALKPHOS 144 02/07/2021    AST 13 02/07/2021    ALT 17 02/07/2021       POC Tests:   Recent Labs     03/13/21  1016   POCGLU 196*       Coags:   Lab Results   Component Value Date    APTT 22.4 07/07/2020       HCG (If Applicable): No results found for: PREGTESTUR, PREGSERUM, HCG, HCGQUANT     ABGs: No results found for: PHART, PO2ART, TRP6UQO, XXM8ZTH, BEART, Y6WBULKP     Type & Screen (If Applicable):  No results found for: LABABO, LABRH    Drug/Infectious Status (If Applicable):  No results found for: HIV, HEPCAB    COVID-19 Screening (If Applicable):   Lab Results   Component Value Date    COVID19 Not Detected 03/13/2021         Anesthesia Evaluation  Patient summary reviewed and Nursing notes reviewed no history of anesthetic complications:   Airway: Mallampati: II  TM distance: >3 FB   Neck ROM: full  Mouth opening: > = 3 FB Dental: normal exam         Pulmonary:normal exam    (+) COPD:  asthma:                            Cardiovascular:    (+) hypertension:,         Rhythm: regular  Rate: normal                    Neuro/Psych:   (+) CVA:, neuromuscular disease:, headaches:, psychiatric history:depression/anxiety             GI/Hepatic/Renal:   (+) GERD:,           Endo/Other:    (+) DiabetesType II DM, using insulin, : arthritis:., .                 Abdominal:           Vascular:                                      Anesthesia Plan      general     ASA 3

## 2021-03-13 NOTE — ANESTHESIA POSTPROCEDURE EVALUATION
Department of Anesthesiology  Postprocedure Note    Patient: Erika Collado  MRN: [id number]  YOB: 1965  Date of evaluation: 3/13/2021  Time:  6:26 PM     Procedure Summary     Date: 03/13/21 Room / Location: [address]    Anesthesia Start: 4963 Anesthesia Stop: 5580    Procedure: LEFT RING FINER AMPUTATION (Left ) Diagnosis: (FINGER TRAUMA)    Surgeons: Yang Smith MD Responsible Provider: Isi Pastrana MD    Anesthesia Type: general ASA Status: 3          Anesthesia Type: general    Elin Phase I: Elin Score: 9    Elin Phase II:      Last vitals: Reviewed and per EMR flowsheets.        Anesthesia Post Evaluation    Patient location during evaluation: PACU  Patient participation: complete - patient participated  Level of consciousness: awake and alert  Pain score: 4  Airway patency: patent  Nausea & Vomiting: no nausea and no vomiting  Complications: no  Cardiovascular status: hemodynamically stable  Respiratory status: room air  Hydration status: euvolemic

## 2021-03-13 NOTE — PROGRESS NOTES
CDU Daily Progress Note  Attending Physician       Pt Name: Maya Perez  MRN: [id number]  [place] 1965  Date of evaluation: 3/13/21    I performed a history and physical examination of the patient and discussed management with the resident. I reviewed the residents note and agree with the documented findings and plan of care. Any areas of disagreement are noted on the chart. I was personally present for the key portions of any procedures. I have documented in the chart those procedures where I was not present during the key portions. I have reviewed the emergency nurses triage note. I agree with the chief complaint, past medical history, past surgical history, allergies, medications, social and family history as documented unless otherwise noted below. Documentation of the HPI, Physical Exam and Medical Decision Making performed by medical students or scribes is based on my personal performance of the HPI, PE and MDM. For Physician Assistant/ Nurse Practitioner cases/documentation I have personally evaluated this patient and have completed at least one if not all key elements of the E/M (history, physical exam, and MDM). Additional findings are as noted. The Family History, Social History and Review of Systems are unchanged from the previous day. No significant events overnight.     Plastic surgery to take to OR today for L ring finger distal amputation    Munira Dalton MD  Attending Physician  Critical Decision Unit

## 2021-03-13 NOTE — PROGRESS NOTES
OBS/CDU   RESIDENT NOTE      Patients PCP is:  Steven Portillo MD        KEVIN Sauer is a 50 y.o. female with past medical history of diabetes who presents left fourth digit pain of several weeks duration after felon drainage. Has been noncompliant with outpatient antibiotic regimen. ED work-up demonstrated hand x-ray concerning for signs of osteomyelitis and elevated inflammatory markers. ID has been consulted and is following. Started on ceftaroline and Flagyl. MRI demonstrated osteomyelitis. Seen by plastic surgery recommended distal finger amputation scheduled for today at 1230. No acute events overnight. Currently n.p.o. for OR today. Pain controlled. Has been able to tolerate a full diet without nausea or vomiting. The patient is urinating on his own and is passing flatus. Denies fever, chills, nausea, vomiting, chest pain, shortness of breath, abdominal pain, focal weakness, numbness, tingling, urinary/bowel symptoms, vision changes, visual hallucinations, or headache. PHYSICAL EXAM      General: NAD, AO X 3  Heent: EMOI, PERRL  Neck: SUPPLE, NO JVD  Cardiovascular: RRR, S1S2  Pulmonary: CTAB, NO SOB  Abdomen: SOFT, NTTP, ND, +BS  Extremities: +2/4 PULSES DISTAL, NO SWELLING, left fourth digit swelling, wound  Neuro / Psych: NO NUMBNESS OR TINGLING, MENTATION AT BASELINE    PERTINENT TEST /EXAMS      I have reviewed all available laboratory results.     MEDICATIONS CURRENT   glucose (GLUTOSE) 40 % oral gel 15 g, PRN  dextrose 50 % IV solution, PRN  glucagon (rDNA) injection 1 mg, PRN  dextrose 5 % solution, PRN  ondansetron (ZOFRAN-ODT) disintegrating tablet 4 mg, Q6H PRN  oxyCODONE (ROXICODONE) immediate release tablet 10 mg, Q6H PRN  sodium chloride flush 0.9 % injection 10 mL, PRN  gabapentin (NEURONTIN) capsule 900 mg, TID  metroNIDAZOLE (FLAGYL) tablet 500 mg, 3 times per day  ceftaroline fosamil (TEFLARO) 600 mg in dextrose 5 % 50 mL IVPB, Q12H  ARIPiprazole (ABILIFY) tablet 10 mg, Daily  budesonide-formoterol (SYMBICORT) 80-4.5 MCG/ACT inhaler 2 puff, BID  docusate sodium (COLACE) capsule 100 mg, BID  FLUoxetine (PROZAC) capsule 60 mg, Daily  insulin glargine (LANTUS) injection vial 15 Units, Nightly  melatonin tablet 3 mg, Nightly PRN  metFORMIN (GLUCOPHAGE) tablet 1,000 mg, Daily with breakfast  pantoprazole (PROTONIX) tablet 40 mg, QAM AC  alogliptin (NESINA) tablet 25 mg, Daily  traZODone (DESYREL) tablet 150 mg, Nightly  venlafaxine (EFFEXOR XR) extended release capsule 150 mg, Daily with breakfast  sodium chloride flush 0.9 % injection 10 mL, 2 times per day  sodium chloride flush 0.9 % injection 10 mL, PRN  enoxaparin (LOVENOX) injection 40 mg, Daily  acetaminophen (TYLENOL) tablet 1,000 mg, 3 times per day  0.9 % sodium chloride infusion, Continuous  ibuprofen (ADVIL;MOTRIN) tablet 400 mg, 4x Daily WC        All medication charted and reviewed. CONSULTS      IP CONSULT TO INFECTIOUS DISEASES  IP CONSULT TO IV TEAM  IP CONSULT TO PLASTIC SURGERY    ASSESSMENT/PLAN       Radha Eisenberg is a 50 y.o. female who presents with left fourth digit pain status post felon drainage, has been noncompliant with outpatient antibiotics. Failed outpatient management of pain. ED work-up demonstrated x-ray concerning for signs of osteomyelitis. MRI positive for osteomyelitis. ID consulted, empiric antibiotic started. Seen by plastic surgery and recommended distal finger amputation. · Left fourth digit pain  ? S/p felon drainage  ? ID consulted, ceftaroline and flagyl ongoing  § MRI positive for osteomyelitis  ? Scheduled for finger amputation with plastic surgery today  § Follow-up plastic surgery recommendations  ? inflammatory markers improved  § CRP 6.1-->4.2  § -->55  ?  Home lantus, metformin, currently NPO since midnight  · Continue home medications and pain control  · Monitor vitals, labs, and imaging  · DISPO: pending consults and

## 2021-03-13 NOTE — PROGRESS NOTES
(NEURONTIN) capsule 900 mg, TID  metroNIDAZOLE (FLAGYL) tablet 500 mg, 3 times per day  ceftaroline fosamil (TEFLARO) 600 mg in dextrose 5 % 50 mL IVPB, Q12H  ARIPiprazole (ABILIFY) tablet 10 mg, Daily  budesonide-formoterol (SYMBICORT) 80-4.5 MCG/ACT inhaler 2 puff, BID  docusate sodium (COLACE) capsule 100 mg, BID  FLUoxetine (PROZAC) capsule 60 mg, Daily  insulin glargine (LANTUS) injection vial 15 Units, Nightly  melatonin tablet 3 mg, Nightly PRN  metFORMIN (GLUCOPHAGE) tablet 1,000 mg, Daily with breakfast  pantoprazole (PROTONIX) tablet 40 mg, QAM AC  alogliptin (NESINA) tablet 25 mg, Daily  traZODone (DESYREL) tablet 150 mg, Nightly  venlafaxine (EFFEXOR XR) extended release capsule 150 mg, Daily with breakfast  sodium chloride flush 0.9 % injection 10 mL, 2 times per day  sodium chloride flush 0.9 % injection 10 mL, PRN  enoxaparin (LOVENOX) injection 40 mg, Daily  acetaminophen (TYLENOL) tablet 1,000 mg, 3 times per day  ibuprofen (ADVIL;MOTRIN) tablet 400 mg, 4x Daily WC        All medication charted and reviewed. CONSULTS      IP CONSULT TO INFECTIOUS DISEASES  IP CONSULT TO IV TEAM  IP CONSULT TO PLASTIC SURGERY    ASSESSMENT/PLAN       Clancy Homans is a 50 y.o. female who presents with left fourth digit pain status post felon drainage, has been noncompliant with outpatient antibiotics. Failed outpatient management of pain. ED work-up demonstrated x-ray concerning for signs of osteomyelitis. MRI positive for osteomyelitis. ID consulted, empiric antibiotic started. Seen by plastic surgery and recommended distal finger amputation. · Left fourth digit pain  ? S/p felon drainage  ? ID consulted, ceftaroline and flagyl ongoing  § MRI positive for osteomyelitis  ? POD 1 from finger amputation  § F/u plastics recommendations and dressing takedown  § Continue local wound care  ? inflammatory markers improved  § CRP 6.1-->4.2  § -->55  ? Home lantus, metformin  ?  Carb controlled

## 2021-03-13 NOTE — PROGRESS NOTES
[facility] -   Infectious diseases evaluation  admission date 3/11/2021    reason for consultation:   Finger osteo    Impression :   Current:  · Left ring finger abscess/necrotic wound  · Abscess lanced 2/24/2021, MRSA group B strep Candida albicans, Prevotella  · Osteomyelitis left 4th finger distal phalanx  · Elevated ESR    Other:  ·   Discussion / summary of stay / plan of care   · Concern for underlying osteomyelitis  · Patient did not follow through with antibiotics as outpatient  Recommendations   · Flagyl and ceftaroline for now  · Anticipate short course of antibiotics,   · will evaluate the wound once dressings open      Infection Control Recommendations   · [place] Precautions  · Contact Isolation   Antimicrobial Stewardship Recommendations   · Simplification of therapy  · Targeted therapy      Coordination ofOutpatient Care:   · Estimated Length of IV antimicrobials:  · Patient will need Midline / picc Catheter Insertion:   · Patient will need SNF:  · Patient will need outpatient wound care:     History of Present Illness:   Initial history:  Yong Everett is a 50y.o.-year-old female known to my service for having felon on her finger, had it lanced and cultured, growing MRSA group B strep Prevotella, given oral antibiotics and follow-up as outpatient. Patient was supposed to be still taking oral antibiotics as I understand she stopped taking them and came back to the ER with worsening in her finger condition.   Admitted to observation, infectious was consulted-the fingertip has now a necrotic wound/scab  Pain has increased,            Interval changes  3/13/2021   Patient Vitals for the past 8 hrs:   BP Temp Temp src Pulse Resp SpO2 Height Weight   03/13/21 1600 127/66 98.2 °F (36.8 °C) Oral 95 15 92 % -- --   03/13/21 1415 124/86 97.4 °F (36.3 °C) Oral 85 18 100 % -- --   03/13/21 1400 132/85 97.9 °F (36.6 °C) Temporal 87 15 100 % -- --   03/13/21 1340 -- -- -- -- -- 100 % -- --   03/13/21 1323 128/81 97.9 °F (36.6 °C) Temporal 88 16 100 % -- --   03/13/21 1152 -- -- -- -- -- 96 % -- --   03/13/21 1150 (!) 123/57 98.1 °F (36.7 °C) Temporal 85 16 93 % -- --   03/13/21 1126 -- -- -- -- -- -- 5' 6\" (1.676 m) 250 lb (113.4 kg)   No fever chills  Post amputation of ring finger 3/13  Pain control lungs are clear      Summary of relevant labs:  Labs:  WE 4.5    CRP normal    Micro:  Finger culture 2/24 MRSA group B strep Candida albicans Prevotella  Imaging:  Xray suggesting OM tip of the finger    I have personally reviewed the past medical history, past surgical history, medications, social history, and family history, and I haveupdated the database accordingly. Allergies:   Bactrim [sulfamethoxazole-trimethoprim] and Adhesive tape     Review of Systems:     Review of Systems   Constitutional: Negative for activity change. HENT: Negative for congestion. Eyes: Negative for discharge. Respiratory: Negative for apnea and choking. Cardiovascular: Negative for chest pain. Gastrointestinal: Negative for abdominal distention. Endocrine: Negative for polyphagia. Genitourinary: Negative for dysuria. Musculoskeletal: Negative for arthralgias. Skin: Positive for color change and wound. Allergic/Immunologic: Negative for immunocompromised state. Neurological: Negative for dizziness. Hematological: Negative for adenopathy. Psychiatric/Behavioral: Negative for agitation. Physical Examination :       Physical Exam  Constitutional:       General: She is not in acute distress. Appearance: Normal appearance. She is not ill-appearing. HENT:      Head: Normocephalic and atraumatic. Nose: Nose normal.      Mouth/Throat:      Mouth: Mucous membranes are moist.   Eyes:      General: No scleral icterus. Conjunctiva/sclera: Conjunctivae normal.   Neck:      Musculoskeletal: Neck supple. No neck rigidity or muscular tenderness. Cardiovascular:      Rate and Rhythm: Normal rate and regular rhythm. Heart sounds: Normal heart sounds. No murmur. Pulmonary:      Effort: No respiratory distress. Breath sounds: Normal breath sounds. No stridor. Abdominal:      General: There is no distension. Palpations: Abdomen is soft. There is no mass. Genitourinary:     Comments: No cullen  Musculoskeletal:         General: No swelling or deformity. Skin:     General: Skin is dry. Coloration: Skin is not jaundiced. Findings: No bruising. Neurological:      General: No focal deficit present. Mental Status: She is alert and oriented to person, place, and time. Cranial Nerves: No cranial nerve deficit. Sensory: No sensory deficit. Psychiatric:         Mood and Affect: Mood normal.         Thought Content:  Thought content normal.         Past Medical History:     Past Medical History:   Diagnosis Date    Acid reflux 5/29/2017    Acute cystitis with hematuria 10/11/2016    Acute non-recurrent maxillary sinusitis 11/28/2017    Asthma     Bipolar 1 disorder ([address] 1/4/2018    Bipolar disorder, mixed ([address]     BMI 34.0-34.9,adult 11/28/2017    Cannabis use disorder, severe, dependence ([address] 12/19/2017    Cerebrovascular accident (CVA) ([address] 6/14/2017    Chest pain 11/5/2016    Chronic renal insufficiency     Cocaine abuse ([address] 1/5/2018    DDD (degenerative disc disease), cervical     Diabetes mellitus ([address]     Dizziness 6/13/2017    Fibromyalgia     History of stroke 9/9/2017    Homicidal ideation 11/6/2017    Hyperglycemia     Hypertension     Hypotension 1/18/2019    IDDM (insulin dependent diabetes mellitus) 12/21/2015    Lupus ([address]     Migraine     Neuropathy     Neuropathy     Polysubstance abuse ([address] 9/17/2017    Post traumatic stress disorder (PTSD)     Posttraumatic stress disorder 5/29/2017    Recurrent depression ([address] 5/29/2017    Recurrent major depressive disorder, in partial remission ([facility] 11/28/2017    Screening mammogram, encounter for 11/28/2017    Severe recurrent major depression with psychotic features ([facility] 12/19/2017    Severe recurrent major depression without psychotic features ([facility] 12/19/2017    Stroke (cerebrum) ([facility] [facility] 6/14/2017    St[facility]     per chart notes    Suicidal ideation     Suicidal intent 3/10/2017    Vitamin D deficiency 11/28/2017    White matter changes 6/13/2017       Past Surgical  History:     Past Surgical History:   Procedure Laterality Date    ABDOMEN SURGERY      drain tube    ABSCESS DRAINAGE      right buttock    AMPUTATION Left 03/13/2021    ring finger left    CATARACT REMOVAL WITH IMPLANT Bilateral     CHEST TUBE INSERTION      LASIK Bilateral        Medications:      HYDROmorphone        insulin lispro  0-6 Units Subcutaneous TID WC    insulin lispro  0-3 Units Subcutaneous Nightly    gabapentin  900 mg Oral TID    metroNIDAZOLE  500 mg Oral 3 times per day    ceftaroline fosamil (TEFLARO) IVPB  600 mg Intravenous Q12H    ARIPiprazole  10 mg Oral Daily    budesonide-formoterol  2 puff Inhalation BID    docusate sodium  100 mg Oral BID    FLUoxetine  60 mg Oral Daily    insulin glargine  15 Units Subcutaneous Nightly    metFORMIN  1,000 mg Oral Daily with breakfast    pantoprazole  40 mg Oral QAM AC    alogliptin  25 mg Oral Daily    traZODone  150 mg Oral Nightly    venlafaxine  150 mg Oral Daily with breakfast    sodium chloride flush  10 mL Intravenous 2 times per day    enoxaparin  40 mg Subcutaneous Daily    acetaminophen  1,000 mg Oral 3 times per day    ibuprofen  400 mg Oral 4x Daily WC       Social History:     Social History     Socioeconomic History    Marital status: Legally      Spouse name: Not on file    Number of children: Not on file    Years of education: Not on file    Highest education level: Not on file   Occupational History    Not on file   Social Needs    Financial resource strain: Not on file    Food insecurity     Worry: Never true     Inability: Never true    Transportation needs     Medical: No     Non-medical: No   Tobacco Use    Smoking status: Never Smoker    Smokeless tobacco: Never Used   Substance and Sexual Activity    Alcohol use: Not Currently    Drug use: Not Currently     Types: Marijuana, Cocaine    Sexual activity: Not Currently     Partners: Male   Lifestyle    Physical activity     Days per week: Not on file     Minutes per session: Not on file    Stress: Not on file   Relationships    Social connections     Talks on phone: Not on file     Gets together: Not on file     Attends Judaism service: Not on file     Active member of club or organization: Not on file     Attends meetings of clubs or organizations: Not on file     Relationship status: Not on file    Intimate partner violence     Fear of current or ex partner: Not on file     Emotionally abused: Not on file     Physically abused: Not on file     Forced sexual activity: Not on file   Other Topics Concern    Not on file   Social History Narrative    Not on file       Family History:     Family History   Problem Relation Age of Onset    Diabetes Mother     Stroke Mother     Diabetes Father     Diabetes Sister     Diabetes Brother     Other Son         GSW    No Known Problems Sister       Medical Decision Making:   I have independently reviewed/ordered the following labs:    CBC with Differential:   Recent Labs     03/12/21  0501 03/13/21  0314   WBC 4.9 4.5   HGB 8.8* 8.9*   HCT 29.6* 29.5*    304   LYMPHOPCT 40 34   MONOPCT 14* 13*     BMP:  Recent Labs     03/11/21  1935 03/12/21  0501    138   K 4.2 4.1    103   CO2 24 22   BUN 12 12   CREATININE 0.73 0.75     Hepatic Function Panel: No results for input(s): PROT, LABALBU, BILIDIR, IBILI, BILITOT, ALKPHOS, ALT, AST in the last 72 hours. No results for input(s): RPR in the last 72 hours.   No results for input(s): HIV in the last 72 hours. No results for input(s): BC in the last 72 hours. Lab Results   Component Value Date    CREATININE 0.75 03/12/2021    GLUCOSE 203 03/12/2021       Detailed results: Thank you for allowing us to participate in the care of this patient. Please call with questions. This note is created with the assistance of a speech recognition program.  While intending to generate adocument that actually reflects the content of the visit, the document can still have some errors including those of syntax and sound a like substitutions which may escape proof reading. It such instances, actual meaningcan be extrapolated by contextual diversion.     Lia Oneill MD  Office: [phone]  Perfect serve / office [phone]

## 2021-03-13 NOTE — BRIEF OP NOTE
Brief Postoperative Note      Patient: Aurora Capone  YOB: 1965  MRN: [id number]    Date of Procedure: 3/13/2021    Pre-Op Diagnosis: osteomyelitis/ulcer left ring finger    Post-Op Diagnosis: Same       Procedure(s):  LEFT RING FINER AMPUTATION    Surgeon(s):  Vernell Alcocer MD    Assistant:  * No surgical staff found *    Anesthesia: General    Estimated Blood Loss (mL): Minimal    Complications: None    Specimens:   ID Type Source Tests Collected by Time Destination   A : LEFT RING FINGER, R/O OSTEOMYELITIS Tissue Finger SURGICAL PATHOLOGY Vernell Alcocer MD 3/13/2021 1257        Implants:  * No implants in log *      Drains: * No LDAs found *    Findings:     Electronically signed by Vernell Alcocer MD on 3/13/2021 at 1:20 PM

## 2021-03-13 NOTE — PLAN OF CARE
Problem: Falls - Risk of:  Goal: Will remain free from falls  Description: Will remain free from falls  3/13/2021 1410 by David Lofton RN  Outcome: Ongoing  3/13/2021 0709 by Lola Elena  Outcome: Ongoing  Goal: Absence of physical injury  Description: Absence of physical injury  3/13/2021 1410 by David Lofton RN  Outcome: Ongoing  3/13/2021 0709 by Lola Elena  Outcome: Ongoing     Problem: Pain:  Goal: Pain level will decrease  Description: Pain level will decrease  3/13/2021 1410 by David Lofton RN  Outcome: Ongoing  3/13/2021 0709 by Lola Elena  Outcome: Ongoing  Goal: Control of acute pain  Description: Control of acute pain  3/13/2021 1410 by David Lofton RN  Outcome: Ongoing  3/13/2021 0709 by Lola Elena  Outcome: Ongoing  Goal: Control of chronic pain  Description: Control of chronic pain  3/13/2021 1410 by David Lofton RN  Outcome: Ongoing  3/13/2021 0709 by Lola Elena  Outcome: Ongoing

## 2021-03-14 LAB
ABSOLUTE EOS #: 0.09 K/UL (ref 0–0.44)
ABSOLUTE IMMATURE GRANULOCYTE: <0.03 K/UL (ref 0–0.3)
ABSOLUTE LYMPH #: 1.42 K/UL (ref 1.1–3.7)
ABSOLUTE MONO #: 0.5 K/UL (ref 0.1–1.2)
BASOPHILS # BLD: 1 % (ref 0–2)
BASOPHILS ABSOLUTE: 0.03 K/UL (ref 0–0.2)
DIFFERENTIAL TYPE: ABNORMAL
EOSINOPHILS RELATIVE PERCENT: 2 % (ref 1–4)
GLUCOSE BLD-MCNC: 158 MG/DL (ref 65–105)
GLUCOSE BLD-MCNC: 238 MG/DL (ref 65–105)
GLUCOSE BLD-MCNC: 300 MG/DL (ref 65–105)
GLUCOSE BLD-MCNC: 304 MG/DL (ref 65–105)
HCT VFR BLD CALC: 29.9 % (ref 36.3–47.1)
HEMOGLOBIN: 9 G/DL (ref 11.9–15.1)
IMMATURE GRANULOCYTES: 0 %
LYMPHOCYTES # BLD: 25 % (ref 24–43)
MCH RBC QN AUTO: 31.5 PG (ref 25.2–33.5)
MCHC RBC AUTO-ENTMCNC: 30.1 G/DL (ref 28.4–34.8)
MCV RBC AUTO: 104.5 FL (ref 82.6–102.9)
MONOCYTES # BLD: 9 % (ref 3–12)
NRBC AUTOMATED: 0 PER 100 WBC
PDW BLD-RTO: 13.9 % (ref 11.8–14.4)
PLATELET # BLD: 336 K/UL (ref 138–453)
PLATELET ESTIMATE: ABNORMAL
PMV BLD AUTO: 10.6 FL (ref 8.1–13.5)
RBC # BLD: 2.86 M/UL (ref 3.95–5.11)
RBC # BLD: ABNORMAL 10*6/UL
SEG NEUTROPHILS: 63 % (ref 36–65)
SEGMENTED NEUTROPHILS ABSOLUTE COUNT: 3.56 K/UL (ref 1.5–8.1)
WBC # BLD: 5.6 K/UL (ref 3.5–11.3)
WBC # BLD: ABNORMAL 10*3/UL

## 2021-03-14 PROCEDURE — 6370000000 HC RX 637 (ALT 250 FOR IP): Performed by: PLASTIC SURGERY

## 2021-03-14 PROCEDURE — 2580000003 HC RX 258: Performed by: PLASTIC SURGERY

## 2021-03-14 PROCEDURE — 6360000002 HC RX W HCPCS: Performed by: STUDENT IN AN ORGANIZED HEALTH CARE EDUCATION/TRAINING PROGRAM

## 2021-03-14 PROCEDURE — 6360000002 HC RX W HCPCS: Performed by: PLASTIC SURGERY

## 2021-03-14 PROCEDURE — 36415 COLL VENOUS BLD VENIPUNCTURE: CPT

## 2021-03-14 PROCEDURE — 94640 AIRWAY INHALATION TREATMENT: CPT

## 2021-03-14 PROCEDURE — 1200000000 HC SEMI PRIVATE

## 2021-03-14 PROCEDURE — 85025 COMPLETE CBC W/AUTO DIFF WBC: CPT

## 2021-03-14 PROCEDURE — 6370000000 HC RX 637 (ALT 250 FOR IP): Performed by: STUDENT IN AN ORGANIZED HEALTH CARE EDUCATION/TRAINING PROGRAM

## 2021-03-14 PROCEDURE — 82947 ASSAY GLUCOSE BLOOD QUANT: CPT

## 2021-03-14 RX ORDER — OXYCODONE HYDROCHLORIDE 5 MG/1
5 TABLET ORAL EVERY 6 HOURS PRN
Status: DISCONTINUED | OUTPATIENT
Start: 2021-03-14 | End: 2021-03-14

## 2021-03-14 RX ORDER — OXYCODONE HYDROCHLORIDE 5 MG/1
10 TABLET ORAL EVERY 6 HOURS PRN
Status: DISCONTINUED | OUTPATIENT
Start: 2021-03-14 | End: 2021-03-15 | Stop reason: HOSPADM

## 2021-03-14 RX ADMIN — ACETAMINOPHEN 1000 MG: 500 TABLET ORAL at 05:56

## 2021-03-14 RX ADMIN — ACETAMINOPHEN 1000 MG: 500 TABLET ORAL at 13:17

## 2021-03-14 RX ADMIN — INSULIN LISPRO 4 UNITS: 100 INJECTION, SOLUTION INTRAVENOUS; SUBCUTANEOUS at 21:07

## 2021-03-14 RX ADMIN — SODIUM CHLORIDE, PRESERVATIVE FREE 10 ML: 5 INJECTION INTRAVENOUS at 21:06

## 2021-03-14 RX ADMIN — INSULIN LISPRO 2 UNITS: 100 INJECTION, SOLUTION INTRAVENOUS; SUBCUTANEOUS at 11:50

## 2021-03-14 RX ADMIN — OXYCODONE HYDROCHLORIDE 10 MG: 5 TABLET ORAL at 17:31

## 2021-03-14 RX ADMIN — CEFTAROLINE FOSAMIL 600 MG: 600 POWDER, FOR SOLUTION INTRAVENOUS at 16:14

## 2021-03-14 RX ADMIN — BUDESONIDE AND FORMOTEROL FUMARATE DIHYDRATE 2 PUFF: 80; 4.5 AEROSOL RESPIRATORY (INHALATION) at 08:48

## 2021-03-14 RX ADMIN — VENLAFAXINE HYDROCHLORIDE 150 MG: 150 CAPSULE, EXTENDED RELEASE ORAL at 08:12

## 2021-03-14 RX ADMIN — HYDROMORPHONE HYDROCHLORIDE 0.5 MG: 1 INJECTION, SOLUTION INTRAMUSCULAR; INTRAVENOUS; SUBCUTANEOUS at 09:52

## 2021-03-14 RX ADMIN — METRONIDAZOLE 500 MG: 500 TABLET ORAL at 00:15

## 2021-03-14 RX ADMIN — TRAZODONE HYDROCHLORIDE 150 MG: 100 TABLET ORAL at 21:06

## 2021-03-14 RX ADMIN — GABAPENTIN 900 MG: 300 CAPSULE ORAL at 21:06

## 2021-03-14 RX ADMIN — INSULIN GLARGINE 15 UNITS: 100 INJECTION, SOLUTION SUBCUTANEOUS at 21:07

## 2021-03-14 RX ADMIN — INSULIN LISPRO 4 UNITS: 100 INJECTION, SOLUTION INTRAVENOUS; SUBCUTANEOUS at 06:58

## 2021-03-14 RX ADMIN — MORPHINE SULFATE 2 MG: 2 INJECTION, SOLUTION INTRAMUSCULAR; INTRAVENOUS at 03:03

## 2021-03-14 RX ADMIN — MORPHINE SULFATE 2 MG: 2 INJECTION, SOLUTION INTRAMUSCULAR; INTRAVENOUS at 06:56

## 2021-03-14 RX ADMIN — GABAPENTIN 900 MG: 300 CAPSULE ORAL at 08:12

## 2021-03-14 RX ADMIN — METRONIDAZOLE 500 MG: 500 TABLET ORAL at 08:12

## 2021-03-14 RX ADMIN — IBUPROFEN 400 MG: 400 TABLET, FILM COATED ORAL at 16:15

## 2021-03-14 RX ADMIN — IBUPROFEN 400 MG: 400 TABLET, FILM COATED ORAL at 08:12

## 2021-03-14 RX ADMIN — OXYCODONE HYDROCHLORIDE 10 MG: 5 TABLET ORAL at 11:50

## 2021-03-14 RX ADMIN — Medication 3 MG: at 22:31

## 2021-03-14 RX ADMIN — METFORMIN HYDROCHLORIDE 1000 MG: 500 TABLET ORAL at 08:12

## 2021-03-14 RX ADMIN — INSULIN LISPRO 4 UNITS: 100 INJECTION, SOLUTION INTRAVENOUS; SUBCUTANEOUS at 16:29

## 2021-03-14 RX ADMIN — METRONIDAZOLE 500 MG: 500 TABLET ORAL at 16:15

## 2021-03-14 RX ADMIN — BUDESONIDE AND FORMOTEROL FUMARATE DIHYDRATE 2 PUFF: 80; 4.5 AEROSOL RESPIRATORY (INHALATION) at 20:12

## 2021-03-14 RX ADMIN — INSULIN LISPRO 4 UNITS: 100 INJECTION, SOLUTION INTRAVENOUS; SUBCUTANEOUS at 08:15

## 2021-03-14 RX ADMIN — DOCUSATE SODIUM 100 MG: 100 CAPSULE, LIQUID FILLED ORAL at 21:06

## 2021-03-14 RX ADMIN — CEFTAROLINE FOSAMIL 600 MG: 600 POWDER, FOR SOLUTION INTRAVENOUS at 05:01

## 2021-03-14 RX ADMIN — ACETAMINOPHEN 1000 MG: 500 TABLET ORAL at 22:31

## 2021-03-14 RX ADMIN — GABAPENTIN 900 MG: 300 CAPSULE ORAL at 13:17

## 2021-03-14 RX ADMIN — DOCUSATE SODIUM 100 MG: 100 CAPSULE, LIQUID FILLED ORAL at 08:13

## 2021-03-14 RX ADMIN — IBUPROFEN 400 MG: 400 TABLET, FILM COATED ORAL at 11:50

## 2021-03-14 RX ADMIN — OXYCODONE HYDROCHLORIDE 7.5 MG: 5 TABLET ORAL at 05:56

## 2021-03-14 RX ADMIN — PANTOPRAZOLE SODIUM 40 MG: 40 TABLET, DELAYED RELEASE ORAL at 06:56

## 2021-03-14 RX ADMIN — IBUPROFEN 400 MG: 400 TABLET, FILM COATED ORAL at 20:25

## 2021-03-14 ASSESSMENT — PAIN SCALES - GENERAL
PAINLEVEL_OUTOF10: 8
PAINLEVEL_OUTOF10: 10
PAINLEVEL_OUTOF10: 5
PAINLEVEL_OUTOF10: 10
PAINLEVEL_OUTOF10: 4

## 2021-03-14 ASSESSMENT — PAIN DESCRIPTION - PAIN TYPE: TYPE: SURGICAL PAIN

## 2021-03-14 ASSESSMENT — PAIN DESCRIPTION - FREQUENCY
FREQUENCY: CONTINUOUS
FREQUENCY: CONTINUOUS

## 2021-03-14 NOTE — PLAN OF CARE
Problem: Falls - Risk of:  Goal: Will remain free from falls  Description: Will remain free from falls  3/14/2021 0459 by Felicia Esposito RN  Outcome: Ongoing  3/13/2021 1410 by Oliverio Velasco RN  Outcome: Ongoing  Goal: Absence of physical injury  Description: Absence of physical injury  3/14/2021 0459 by Felicia Esposito RN  Outcome: Ongoing  3/13/2021 1410 by Oliverio Velasco RN  Outcome: Ongoing     Problem: Pain:  Goal: Pain level will decrease  Description: Pain level will decrease  3/14/2021 0459 by Felicia Esposito RN  Outcome: Ongoing  3/13/2021 1410 by Oliverio Velasco RN  Outcome: Ongoing  Goal: Control of acute pain  Description: Control of acute pain  3/14/2021 0459 by Felicia Esposito RN  Outcome: Ongoing  3/13/2021 1410 by Oliverio Velasco RN  Outcome: Ongoing  Goal: Control of chronic pain  Description: Control of chronic pain  3/14/2021 0459 by Felicia Esposito RN  Outcome: Ongoing  3/13/2021 1410 by Oliverio Velasco RN  Outcome: Ongoing

## 2021-03-14 NOTE — PLAN OF CARE
Problem: Falls - Risk of:  Goal: Will remain free from falls  Description: Will remain free from falls  3/14/2021 1609 by Jarod Dominguez RN  Outcome: Ongoing  3/14/2021 0459 by Rody Pereyra RN  Outcome: Ongoing  3/14/2021 0459 by Rody Pereyra RN  Outcome: Ongoing  Goal: Absence of physical injury  Description: Absence of physical injury  3/14/2021 1609 by Jarod Dominguez RN  Outcome: Ongoing  3/14/2021 0459 by Rody Pereyra RN  Outcome: Ongoing  3/14/2021 0459 by Rody Pereyra RN  Outcome: Ongoing     Problem: Pain:  Goal: Pain level will decrease  Description: Pain level will decrease  3/14/2021 1609 by Jarod Dominguez RN  Outcome: Ongoing  3/14/2021 0459 by Rody Pereyra RN  Outcome: Ongoing  3/14/2021 0459 by Rody Pereyra RN  Outcome: Ongoing  Goal: Control of acute pain  Description: Control of acute pain  3/14/2021 1609 by Jarod Dominguez RN  Outcome: Ongoing  3/14/2021 0459 by Rody Pereyra RN  Outcome: Ongoing  3/14/2021 0459 by Rody Pereyra RN  Outcome: Ongoing  Goal: Control of chronic pain  Description: Control of chronic pain  3/14/2021 1609 by Jarod Dominguez RN  Outcome: Ongoing  3/14/2021 0459 by Rody Pereyra RN  Outcome: Ongoing  3/14/2021 0459 by Rody Pereyra RN  Outcome: Ongoing

## 2021-03-14 NOTE — OP NOTE
[address] [facility] [facility] [address]                                OPERATIVE REPORT    PATIENT NAME: Chase Chopra                   :        1965  MED REC NO:   [id number]                             ROOM:       [id number]  ACCOUNT NO:   [de-identified]                           ADMIT DATE: 2021  PROVIDER:     Erica Blancas    DATE OF PROCEDURE:  2021    ATTENDING PHYSICIAN:  Alberto Mora MD    SURGEON:  Erica Blancas MD    PREOPERATIVE DIAGNOSES:  Ulceration and osteomyelitis, distal left ring  finger. POSTOPERATIVE DIAGNOSES:  Ulceration and osteomyelitis, distal left ring  finger. PROCEDURE:  Amputation of left ring finger, proximal to DIP joint. ANESTHESIA:  General.    INDICATIONS:  The patient is a 79-year-old female who approximately one  month ago had a felon, which was drained. Antibiotics were taken  inconsistently. She worsened and presents now with ulceration of the  pad and osteomyelitis on x-ray and MRI of the terminal phalanx. Scheduled now for amputation of the distal left ring finger. The  procedure, the risks, the benefits were discussed with her. All  questions were answered to her satisfaction. Consent was signed. NARRATIVE SUMMARY:  The patient was brought to the operating suite,  placed under general anesthetic in the supine position. The left arm  was placed on the armboard, prepped and draped in the usual sterile  fashion. Initially, using a scalpel, incision was made around the necrotic  portions of the tip. These were deepened down to the bone. The  terminal phalanx was excised at the DIP joint level. The flexor tendon  was withdrawn and trimmed to allow it to retract. The cartilage of the  middle phalanx appeared totally normal.  There did not appear to be  extension of the osteomyelitis to the middle phalanx.   All devitalized  diseased tissues were excised, and the wound was irrigated out. At this point, the cartilage from the middle phalanx was debrided away  with a rongeur, and the tip of the bone was rounded to make it a new  tip. The skin flaps were then brought around this and covered it  nicely; trimmed as necessary. Bovie cautery used for hemostasis. Wound  was closed with interrupted sutures of 4-0 black nylon. She was dressed  with Adaptic, fluff, and Maribeth. Note that prior to incision, a course  of Marcaine plain was instilled as a digital block to assist with  postoperative pain control. The patient tolerated the procedure well. Blood loss:  Minimal.  Specimens:  Fingertip, sent for pathology. Complications:  None. The patient was transferred to Recovery in stable  condition.         Christie Christinason    D: 03/13/2021 13:36:32       T: 03/13/2021 13:48:26     [email or web address]  Job#: [id number]     Doc#: [id number]    CC:

## 2021-03-14 NOTE — PROGRESS NOTES
[address]  CDU / OBSERVATION ENCOUNTER  ATTENDING NOTE       I performed a history and physical examination of the patient and discussed management with the resident or midlevel provider. I reviewed the resident or midlevel provider's note and agree with the documented findings and plan of care. Any areas of disagreement are noted on the chart. I was personally present for the key portions of any procedures. I have documented in the chart those procedures where I was not present during the key portions. I have reviewed the nurses notes. I agree with the chief complaint, past medical history, past surgical history, allergies, medications, social and family history as documented unless otherwise noted below. The Family history, social history, and ROS are effectively unchanged since admission unless noted elsewhere in the chart. Patient with osteomyelitis. Status post surgery. Appreciate ID and hand input. Awaiting clearance from ID prior to discharge.     Joe Rosales MD  Attending Emergency  Physician

## 2021-03-15 VITALS
TEMPERATURE: 97.6 F | DIASTOLIC BLOOD PRESSURE: 41 MMHG | HEIGHT: 66 IN | BODY MASS INDEX: 40.18 KG/M2 | WEIGHT: 250 LBS | RESPIRATION RATE: 16 BRPM | HEART RATE: 93 BPM | OXYGEN SATURATION: 97 % | SYSTOLIC BLOOD PRESSURE: 120 MMHG

## 2021-03-15 LAB
ABSOLUTE EOS #: 0.19 K/UL (ref 0–0.44)
ABSOLUTE IMMATURE GRANULOCYTE: <0.03 K/UL (ref 0–0.3)
ABSOLUTE LYMPH #: 1.78 K/UL (ref 1.1–3.7)
ABSOLUTE MONO #: 0.43 K/UL (ref 0.1–1.2)
BASOPHILS # BLD: 1 % (ref 0–2)
BASOPHILS ABSOLUTE: <0.03 K/UL (ref 0–0.2)
DIFFERENTIAL TYPE: ABNORMAL
EOSINOPHILS RELATIVE PERCENT: 4 % (ref 1–4)
GLUCOSE BLD-MCNC: 155 MG/DL (ref 65–105)
GLUCOSE BLD-MCNC: 186 MG/DL (ref 65–105)
GLUCOSE BLD-MCNC: 269 MG/DL (ref 65–105)
HCT VFR BLD CALC: 30.4 % (ref 36.3–47.1)
HEMOGLOBIN: 9 G/DL (ref 11.9–15.1)
IMMATURE GRANULOCYTES: 1 %
LYMPHOCYTES # BLD: 41 % (ref 24–43)
MCH RBC QN AUTO: 30.9 PG (ref 25.2–33.5)
MCHC RBC AUTO-ENTMCNC: 29.6 G/DL (ref 28.4–34.8)
MCV RBC AUTO: 104.5 FL (ref 82.6–102.9)
MONOCYTES # BLD: 10 % (ref 3–12)
NRBC AUTOMATED: 0 PER 100 WBC
PDW BLD-RTO: 14 % (ref 11.8–14.4)
PLATELET # BLD: 287 K/UL (ref 138–453)
PLATELET ESTIMATE: ABNORMAL
PMV BLD AUTO: 10.2 FL (ref 8.1–13.5)
RBC # BLD: 2.91 M/UL (ref 3.95–5.11)
RBC # BLD: ABNORMAL 10*6/UL
SEG NEUTROPHILS: 43 % (ref 36–65)
SEGMENTED NEUTROPHILS ABSOLUTE COUNT: 1.95 K/UL (ref 1.5–8.1)
WBC # BLD: 4.4 K/UL (ref 3.5–11.3)
WBC # BLD: ABNORMAL 10*3/UL

## 2021-03-15 PROCEDURE — 94640 AIRWAY INHALATION TREATMENT: CPT

## 2021-03-15 PROCEDURE — 6370000000 HC RX 637 (ALT 250 FOR IP): Performed by: PLASTIC SURGERY

## 2021-03-15 PROCEDURE — 99232 SBSQ HOSP IP/OBS MODERATE 35: CPT | Performed by: INTERNAL MEDICINE

## 2021-03-15 PROCEDURE — 85025 COMPLETE CBC W/AUTO DIFF WBC: CPT

## 2021-03-15 PROCEDURE — 36415 COLL VENOUS BLD VENIPUNCTURE: CPT

## 2021-03-15 PROCEDURE — 2580000003 HC RX 258: Performed by: PLASTIC SURGERY

## 2021-03-15 PROCEDURE — 6370000000 HC RX 637 (ALT 250 FOR IP): Performed by: STUDENT IN AN ORGANIZED HEALTH CARE EDUCATION/TRAINING PROGRAM

## 2021-03-15 PROCEDURE — 82947 ASSAY GLUCOSE BLOOD QUANT: CPT

## 2021-03-15 PROCEDURE — 6360000002 HC RX W HCPCS: Performed by: PLASTIC SURGERY

## 2021-03-15 RX ORDER — OXYCODONE HYDROCHLORIDE 5 MG/1
5 TABLET ORAL EVERY 6 HOURS PRN
Qty: 7 TABLET | Refills: 0 | Status: SHIPPED | OUTPATIENT
Start: 2021-03-15 | End: 2021-03-18

## 2021-03-15 RX ORDER — OXYCODONE HYDROCHLORIDE 5 MG/1
5 TABLET ORAL EVERY 6 HOURS PRN
Qty: 7 TABLET | Refills: 0 | Status: SHIPPED | OUTPATIENT
Start: 2021-03-15 | End: 2021-03-15 | Stop reason: SDUPTHER

## 2021-03-15 RX ORDER — IBUPROFEN 400 MG/1
400 TABLET ORAL
Qty: 60 TABLET | Refills: 0 | Status: CANCELLED | OUTPATIENT
Start: 2021-03-15

## 2021-03-15 RX ADMIN — PANTOPRAZOLE SODIUM 40 MG: 40 TABLET, DELAYED RELEASE ORAL at 06:40

## 2021-03-15 RX ADMIN — CEFTAROLINE FOSAMIL 600 MG: 600 POWDER, FOR SOLUTION INTRAVENOUS at 04:32

## 2021-03-15 RX ADMIN — ARIPIPRAZOLE 10 MG: 10 TABLET ORAL at 08:51

## 2021-03-15 RX ADMIN — METFORMIN HYDROCHLORIDE 1000 MG: 500 TABLET ORAL at 08:51

## 2021-03-15 RX ADMIN — IBUPROFEN 400 MG: 400 TABLET, FILM COATED ORAL at 16:51

## 2021-03-15 RX ADMIN — ALOGLIPTIN 25 MG: 12.5 TABLET, FILM COATED ORAL at 08:51

## 2021-03-15 RX ADMIN — VENLAFAXINE HYDROCHLORIDE 150 MG: 150 CAPSULE, EXTENDED RELEASE ORAL at 08:51

## 2021-03-15 RX ADMIN — OXYCODONE HYDROCHLORIDE 10 MG: 5 TABLET ORAL at 13:07

## 2021-03-15 RX ADMIN — METRONIDAZOLE 500 MG: 500 TABLET ORAL at 08:51

## 2021-03-15 RX ADMIN — DOCUSATE SODIUM 100 MG: 100 CAPSULE, LIQUID FILLED ORAL at 08:51

## 2021-03-15 RX ADMIN — METRONIDAZOLE 500 MG: 500 TABLET ORAL at 00:01

## 2021-03-15 RX ADMIN — FLUOXETINE HYDROCHLORIDE 60 MG: 20 CAPSULE ORAL at 08:51

## 2021-03-15 RX ADMIN — INSULIN LISPRO 6 UNITS: 100 INJECTION, SOLUTION INTRAVENOUS; SUBCUTANEOUS at 11:50

## 2021-03-15 RX ADMIN — INSULIN LISPRO 2 UNITS: 100 INJECTION, SOLUTION INTRAVENOUS; SUBCUTANEOUS at 08:01

## 2021-03-15 RX ADMIN — GABAPENTIN 900 MG: 300 CAPSULE ORAL at 13:56

## 2021-03-15 RX ADMIN — IBUPROFEN 400 MG: 400 TABLET, FILM COATED ORAL at 11:50

## 2021-03-15 RX ADMIN — IBUPROFEN 400 MG: 400 TABLET, FILM COATED ORAL at 08:51

## 2021-03-15 RX ADMIN — OXYCODONE HYDROCHLORIDE 10 MG: 5 TABLET ORAL at 00:01

## 2021-03-15 RX ADMIN — ONDANSETRON 4 MG: 4 TABLET, ORALLY DISINTEGRATING ORAL at 10:54

## 2021-03-15 RX ADMIN — BUDESONIDE AND FORMOTEROL FUMARATE DIHYDRATE 2 PUFF: 80; 4.5 AEROSOL RESPIRATORY (INHALATION) at 10:26

## 2021-03-15 RX ADMIN — ACETAMINOPHEN 1000 MG: 500 TABLET ORAL at 06:40

## 2021-03-15 RX ADMIN — GABAPENTIN 900 MG: 300 CAPSULE ORAL at 08:51

## 2021-03-15 RX ADMIN — OXYCODONE HYDROCHLORIDE 10 MG: 5 TABLET ORAL at 06:40

## 2021-03-15 RX ADMIN — ACETAMINOPHEN 1000 MG: 500 TABLET ORAL at 13:56

## 2021-03-15 RX ADMIN — SODIUM CHLORIDE, PRESERVATIVE FREE 10 ML: 5 INJECTION INTRAVENOUS at 10:54

## 2021-03-15 RX ADMIN — INSULIN LISPRO 2 UNITS: 100 INJECTION, SOLUTION INTRAVENOUS; SUBCUTANEOUS at 16:51

## 2021-03-15 ASSESSMENT — ENCOUNTER SYMPTOMS
COLOR CHANGE: 1
ABDOMINAL DISTENTION: 0
CHOKING: 0
APNEA: 0
EYE DISCHARGE: 0

## 2021-03-15 ASSESSMENT — PAIN SCALES - GENERAL
PAINLEVEL_OUTOF10: 10
PAINLEVEL_OUTOF10: 7

## 2021-03-15 NOTE — PROGRESS NOTES
OBS/CDU   RESIDENT NOTE      Patients PCP is:  Caitlyn Burns MD        SUBJECTIVE      Miriam Freeman is a 50 y.o. female with past medical history of diabetes who presents left fourth digit pain of several weeks duration after felon drainage. Has been noncompliant with outpatient antibiotic regimen. ED work-up demonstrated hand x-ray concerning for signs of osteomyelitis and elevated inflammatory markers. ID has been consulted and is following. Started on ceftaroline and Flagyl. MRI demonstrated osteomyelitis. Seen by plastic surgery recommended distal finger amputation now POD 2. Awaiting dressing change and final antibiotic recommendations from ID. Pain controlled. Has been able to tolerate a full diet without nausea or vomiting. The patient is urinating on his own and is passing flatus. Denies fever, chills, nausea, vomiting, chest pain, shortness of breath, abdominal pain, focal weakness, numbness, tingling, urinary/bowel symptoms, vision changes, visual hallucinations, or headache. PHYSICAL EXAM      General: NAD, AO X 3  Heent: EMOI, PERRL  Neck: SUPPLE, NO JVD  Cardiovascular: RRR, S1S2  Pulmonary: CTAB, NO SOB  Abdomen: SOFT, NTTP, ND, +BS  Extremities: +2/4 PULSES DISTAL, NO SWELLING, left fourth s/p amputation  Neuro / Psych: NO NUMBNESS OR TINGLING, MENTATION AT BASELINE    PERTINENT TEST /EXAMS      I have reviewed all available laboratory results.     MEDICATIONS CURRENT   insulin lispro (HUMALOG) injection vial 0-12 Units, TID WC  insulin lispro (HUMALOG) injection vial 0-6 Units, Nightly  oxyCODONE (ROXICODONE) immediate release tablet 10 mg, Q6H PRN  glucose (GLUTOSE) 40 % oral gel 15 g, PRN  dextrose 50 % IV solution, PRN  glucagon (rDNA) injection 1 mg, PRN  dextrose 5 % solution, PRN  ondansetron (ZOFRAN-ODT) disintegrating tablet 4 mg, Q6H PRN  sodium chloride flush 0.9 % injection 10 mL, PRN  gabapentin (NEURONTIN) capsule 900 mg, TID  metroNIDAZOLE (FLAGYL) tablet 500 mg, 3 times per day  ceftaroline fosamil (TEFLARO) 600 mg in dextrose 5 % 50 mL IVPB, Q12H  ARIPiprazole (ABILIFY) tablet 10 mg, Daily  budesonide-formoterol (SYMBICORT) 80-4.5 MCG/ACT inhaler 2 puff, BID  docusate sodium (COLACE) capsule 100 mg, BID  FLUoxetine (PROZAC) capsule 60 mg, Daily  insulin glargine (LANTUS) injection vial 15 Units, Nightly  melatonin tablet 3 mg, Nightly PRN  metFORMIN (GLUCOPHAGE) tablet 1,000 mg, Daily with breakfast  pantoprazole (PROTONIX) tablet 40 mg, QAM AC  alogliptin (NESINA) tablet 25 mg, Daily  traZODone (DESYREL) tablet 150 mg, Nightly  venlafaxine (EFFEXOR XR) extended release capsule 150 mg, Daily with breakfast  sodium chloride flush 0.9 % injection 10 mL, 2 times per day  sodium chloride flush 0.9 % injection 10 mL, PRN  enoxaparin (LOVENOX) injection 40 mg, Daily  acetaminophen (TYLENOL) tablet 1,000 mg, 3 times per day  ibuprofen (ADVIL;MOTRIN) tablet 400 mg, 4x Daily WC        All medication charted and reviewed. CONSULTS      IP CONSULT TO INFECTIOUS DISEASES  IP CONSULT TO IV TEAM  IP CONSULT TO PLASTIC SURGERY    ASSESSMENT/PLAN       Yang Fontana is a 50 y.o. female who presents with left fourth digit pain status post felon drainage, has been noncompliant with outpatient antibiotics. Failed outpatient management of pain. ED work-up demonstrated x-ray concerning for signs of osteomyelitis. MRI positive for osteomyelitis. ID consulted, empiric antibiotic started. Seen by plastic surgery and recommended distal finger amputation. · Left fourth digit pain  ? S/p felon drainage  ? ID consulted, ceftaroline and flagyl ongoing  § MRI positive for osteomyelitis  § Final abx rec pending dressing takedown and wound evaluation  ? POD 2 from finger amputation  § F/u plastics recommendations and dressing takedown  § Continue local wound care  ? inflammatory markers improved  § CRP 6.1-->4.2  § -->55  ? Home lantus, metformin, ISS  ?  Carb controlled diet  · Continue home medications and pain control  · Monitor vitals, labs, and imaging  · DISPO: pending consults and clinical improvement    --  Rebeka Greenfield  Emergency Medicine Resident Physician     This dictation was generated by voice recognition computer software. Although all attempts are made to edit the dictation for accuracy, there may be errors in the transcription that are not intended.

## 2021-03-15 NOTE — PROGRESS NOTES
[facility] -   Infectious diseases evaluation  admission date 3/11/2021    reason for consultation:   Finger osteo    Impression :   Current:  · Left ring finger abscess/necrotic wound  · Abscess lanced 2/24/2021, MRSA group B strep Candida albicans, Prevotella  · Osteomyelitis left 4th finger distal phalanx  · Amputated partially  · Elevated ESR    Other:  ·   Discussion / summary of stay / plan of care   · Concern for underlying osteomyelitis  · Patient did not follow through with antibiotics as outpatient  Recommendations   · Flagyl and ceftaroline for now  · Stop all AB  · ok for DC w betadine 2 x per day on the wound  · See me in office as needed  · Disc w RN and pt    Infection Control Recommendations   · [place] Precautions  · Contact Isolation   Antimicrobial Stewardship Recommendations   · Simplification of therapy  · Targeted therapy      Coordination ofOutpatient Care:   · Estimated Length of IV antimicrobials:  · Patient will need Midline / picc Catheter Insertion:   · Patient will need SNF:  · Patient will need outpatient wound care:     History of Present Illness:   Initial history:  Marlys Samano is a 50y.o.-year-old female known to my service for having felon on her finger, had it lanced and cultured, growing MRSA group B strep Prevotella, given oral antibiotics and follow-up as outpatient. Patient was supposed to be still taking oral antibiotics as I understand she stopped taking them and came back to the ER with worsening in her finger condition.   Admitted to observation, infectious was consulted-the fingertip has now a necrotic wound/scab  Pain has increased,            Interval changes  3/15/2021   Patient Vitals for the past 8 hrs:   BP Temp Temp src Pulse Resp SpO2   03/15/21 0647 126/81 98 °F (36.7 °C) Oral 86 -- 96 %   03/15/21 0430 118/64 97.9 °F (36.6 °C) Oral 77 16 95 %   No fever chills  Post amputation of ring finger 3/13  Stump clean and no signs of soft tissue infection  No nausea, vomiting, diarrhea       Summary of relevant labs:  Labs:  WE 4.5    CRP normal    Micro:  Finger culture 2/24 MRSA group B strep Candida albicans Prevotella  Imaging:  Xray suggesting OM tip of the finger    I have personally reviewed the past medical history, past surgical history, medications, social history, and family history, and I haveupdated the database accordingly. Allergies:   Bactrim [sulfamethoxazole-trimethoprim] and Adhesive tape     Review of Systems:     Review of Systems   Constitutional: Negative for activity change. HENT: Negative for congestion. Eyes: Negative for discharge. Respiratory: Negative for apnea and choking. Cardiovascular: Negative for chest pain. Gastrointestinal: Negative for abdominal distention. Endocrine: Negative for polyphagia. Genitourinary: Negative for dysuria and flank pain. Musculoskeletal: Negative for arthralgias. Skin: Positive for color change and wound. Allergic/Immunologic: Negative for immunocompromised state. Neurological: Negative for dizziness and numbness. Hematological: Negative for adenopathy. Psychiatric/Behavioral: Negative for agitation. Physical Examination :       Physical Exam  Constitutional:       General: She is not in acute distress. Appearance: Normal appearance. She is not ill-appearing. HENT:      Head: Normocephalic and atraumatic. Nose: Nose normal.      Mouth/Throat:      Mouth: Mucous membranes are moist.   Eyes:      General: No scleral icterus. Conjunctiva/sclera: Conjunctivae normal.   Neck:      Musculoskeletal: Neck supple. No neck rigidity or muscular tenderness. Cardiovascular:      Rate and Rhythm: Normal rate and regular rhythm. Heart sounds: Normal heart sounds. No murmur. Pulmonary:      Effort: No respiratory distress. Breath sounds: Normal breath sounds. No stridor.    Abdominal:      General: There is no distension. Palpations: Abdomen is soft. There is no mass. Genitourinary:     Comments: No cullen  Musculoskeletal:         General: No swelling or deformity. Skin:     General: Skin is dry. Coloration: Skin is not jaundiced or pale. Findings: No bruising or erythema. Neurological:      General: No focal deficit present. Mental Status: She is alert and oriented to person, place, and time. Cranial Nerves: No cranial nerve deficit. Sensory: No sensory deficit. Psychiatric:         Mood and Affect: Mood normal.         Thought Content:  Thought content normal.         Past Medical History:     Past Medical History:   Diagnosis Date    Acid reflux 5/29/2017    Acute cystitis with hematuria 10/11/2016    Acute non-recurrent maxillary sinusitis 11/28/2017    Asthma     Bipolar 1 disorder ([address] 1/4/2018    Bipolar disorder, mixed ([address]     BMI 34.0-34.9,adult 11/28/2017    Cannabis use disorder, severe, dependence ([address] 12/19/2017    Cerebrovascular accident (CVA) ([address] 6/14/2017    Chest pain 11/5/2016    Chronic renal insufficiency     Cocaine abuse ([address] 1/5/2018    DDD (degenerative disc disease), cervical     Diabetes mellitus ([address]     Dizziness 6/13/2017    Fibromyalgia     History of stroke 9/9/2017    Homicidal ideation 11/6/2017    Hyperglycemia     Hypertension     Hypotension 1/18/2019    IDDM (insulin dependent diabetes mellitus) 12/21/2015    Lupus ([address]     Migraine     Neuropathy     Neuropathy     Polysubstance abuse ([address] 9/17/2017    Post traumatic stress disorder (PTSD)     Posttraumatic stress disorder 5/29/2017    Recurrent depression ([address] 5/29/2017    Recurrent major depressive disorder, in partial remission ([address] 11/28/2017    Screening mammogram, encounter for 11/28/2017    Severe recurrent major depression with psychotic features ([address] 12/19/2017    Severe recurrent major depression without psychotic features ([address] 12/19/2017    Stroke (cerebrum) [facility] 6/14/2017    Stroke ([facility]     per chart notes    Suicidal ideation     Suicidal intent 3/10/2017    Vitamin D deficiency 11/28/2017    White matter changes 6/13/2017       Past Surgical  History:     Past Surgical History:   Procedure Laterality Date    ABDOMEN SURGERY      drain tube    ABSCESS DRAINAGE      right buttock    AMPUTATION Left 03/13/2021    ring finger left    CATARACT REMOVAL WITH IMPLANT Bilateral     CHEST TUBE INSERTION      LASIK Bilateral        Medications:      insulin lispro  0-12 Units Subcutaneous TID WC    insulin lispro  0-6 Units Subcutaneous Nightly    gabapentin  900 mg Oral TID    metroNIDAZOLE  500 mg Oral 3 times per day    ceftaroline fosamil (TEFLARO) IVPB  600 mg Intravenous Q12H    ARIPiprazole  10 mg Oral Daily    budesonide-formoterol  2 puff Inhalation BID    docusate sodium  100 mg Oral BID    FLUoxetine  60 mg Oral Daily    insulin glargine  15 Units Subcutaneous Nightly    metFORMIN  1,000 mg Oral Daily with breakfast    pantoprazole  40 mg Oral QAM AC    alogliptin  25 mg Oral Daily    traZODone  150 mg Oral Nightly    venlafaxine  150 mg Oral Daily with breakfast    sodium chloride flush  10 mL Intravenous 2 times per day    enoxaparin  40 mg Subcutaneous Daily    acetaminophen  1,000 mg Oral 3 times per day    ibuprofen  400 mg Oral 4x Daily WC       Social History:     Social History     Socioeconomic History    Marital status: Legally      Spouse name: Not on file    Number of children: Not on file    Years of education: Not on file    Highest education level: Not on file   Occupational History    Not on file   Social Needs    Financial resource strain: Not on file    Food insecurity     Worry: Never true     Inability: Never true    Transportation needs     Medical: No     Non-medical: No   Tobacco Use    Smoking status: Never Smoker    Smokeless tobacco: Never Used   Substance and Sexual Activity    Alcohol use: Not Currently    Drug use: Not Currently     Types: Marijuana, Cocaine    Sexual activity: Not Currently     Partners: Male   Lifestyle    Physical activity     Days per week: Not on file     Minutes per session: Not on file    Stress: Not on file   Relationships    Social connections     Talks on phone: Not on file     Gets together: Not on file     Attends Restorationist service: Not on file     Active member of club or organization: Not on file     Attends meetings of clubs or organizations: Not on file     Relationship status: Not on file    Intimate partner violence     Fear of current or ex partner: Not on file     Emotionally abused: Not on file     Physically abused: Not on file     Forced sexual activity: Not on file   Other Topics Concern    Not on file   Social History Narrative    Not on file       Family History:     Family History   Problem Relation Age of Onset    Diabetes Mother     Stroke Mother     Diabetes Father     Diabetes Sister     Diabetes Brother     Other Son         GSW    No Known Problems Sister       Medical Decision Making:   I have independently reviewed/ordered the following labs:    CBC with Differential:   Recent Labs     03/14/21  0542 03/15/21  0552   WBC 5.6 4.4   HGB 9.0* 9.0*   HCT 29.9* 30.4*    287   LYMPHOPCT 25 41   MONOPCT 9 10     BMP:  No results for input(s): NA, K, CL, CO2, BUN, CREATININE, MG in the last 72 hours. Invalid input(s): CA  Hepatic Function Panel: No results for input(s): PROT, LABALBU, BILIDIR, IBILI, BILITOT, ALKPHOS, ALT, AST in the last 72 hours. No results for input(s): RPR in the last 72 hours. No results for input(s): HIV in the last 72 hours. No results for input(s): BC in the last 72 hours. Lab Results   Component Value Date    CREATININE 0.75 03/12/2021    GLUCOSE 203 03/12/2021       Detailed results: Thank you for allowing us to participate in the care of this patient. Please call with questions. This note is created with the assistance of a speech recognition program.  While intending to generate adocument that actually reflects the content of the visit, the document can still have some errors including those of syntax and sound a like substitutions which may escape proof reading. It such instances, actual meaningcan be extrapolated by contextual diversion.     Nick Duarte MD  Office: [phone]  Perfect serve / office [phone]

## 2021-03-15 NOTE — PROGRESS NOTES
[address]  CDU / OBSERVATION ENCOUNTER  ATTENDING NOTE       I performed a history and physical examination of the patient and discussed management with the resident or midlevel provider. I reviewed the resident or midlevel provider's note and agree with the documented findings and plan of care. Any areas of disagreement are noted on the chart. I was personally present for the key portions of any procedures. I have documented in the chart those procedures where I was not present during the key portions. I have reviewed the nurses notes. I agree with the chief complaint, past medical history, past surgical history, allergies, medications, social and family history as documented unless otherwise noted below. The Family history, social history, and ROS are effectively unchanged since admission unless noted elsewhere in the chart. Patient for reassessment today. Patient has been doing well postop. Postop day 2. For dressing changes and reassessment with anticipated discharge on oral antibiotics today.     Chago Garay MD  Attending Emergency  Physician

## 2021-03-15 NOTE — DISCHARGE SUMMARY
CDU Discharge Summary        Patient:  Jeniffer López  YOB: 1965    MRN: [id number]   Acct: [de-identified]    Primary Care Physician: Lacey Arshad MD    Admit date:  3/11/2021  3:38 PM  Discharge date: 3/15/2021  6:47 PM     Discharge Diagnoses:     Osteomytelitis due to overlying infection without complaince with antibiotics  Improved with finger amputation    Follow-up:  Call today/tomorrow for a follow up appointment with Lacey Arshad MD , or return to the Emergency Room with worsening symptoms    Stressed to patient the importance of following up with primary care doctor for further workup/management of symptoms. Pt verbalizes understanding and agrees with plan.     Discharge Medications:  Changes to medications          [facility] Medication Instructions KQS:[id number]    Printed on:03/19/21 0693   Medication Information                      acetaminophen (TYLENOL) 325 MG tablet  TAKE 2 TABLETS BY MOUTH EVERY 6 HOURS AS NEEDED FOR PAIN             albuterol sulfate  (90 Base) MCG/ACT inhaler  Inhale 2 puffs into the lungs every 4 hours as needed for Wheezing             Alcohol Swabs (ALCOHOL PREP) 70 % PADS  1 each by Does not apply route as needed (use as needed) Use_____times per day  Diagnosis: 250.0   Diabetes ___Insulin-Dependent___Non-Insulin Dependent             benzonatate (TESSALON) 200 MG capsule               budesonide-formoterol (SYMBICORT) 80-4.5 MCG/ACT AERO  Inhale 2 puffs into the lungs 2 times daily             celecoxib (CELEBREX) 200 MG capsule               cetirizine (ZYRTEC) 10 MG tablet  Take 1 tablet by mouth daily             dicyclomine (BENTYL) 20 MG tablet  Take 1 tablet by mouth 3 times daily as needed (for abdominal discomfort)              MG capsule  TAKE 1 CAPSULE BY MOUTH TWICE DAILY             FLOVENT  MCG/ACT inhaler  Inhale 2 puffs into the lungs 2 times daily             FreeStyle Lancets MISC  1 each by Does not apply route 3 times daily             FREESTYLE LITE strip  1 each by Does not apply route 3 times daily             gabapentin (NEURONTIN) 300 MG capsule  TAKE 3 CAPSULES 900 MG BY MOUTH THREE TIMES DAILY             insulin glargine (LANTUS SOLOSTAR) 100 UNIT/ML injection pen  Inject 15 Units into the skin nightly             Insulin Pen Needle ([employer] PEN NEEDLES 31G) 31G X 8 MM MISC  1 each by Does not apply route daily             melatonin (RA MELATONIN) 3 MG TABS tablet  Take 1 tablet by mouth daily             metFORMIN (GLUCOPHAGE) 1000 MG tablet  TAKE 1 TABLET BY MOUTH DAILY WITH BREAKFAST             Misc. Devices MISC  1 pair of dm shoes, 3 accommodated inserts             omeprazole (PRILOSEC) 20 MG delayed release capsule  Take 1 capsule by mouth Daily             Povidone-Iodine Scrub Sponge (BETADINE SWAB AID) 10 % SWAB  Apply 1 Stick topically 2 times daily             traZODone (DESYREL) 150 MG tablet  Take 1 tablet by mouth nightly             venlafaxine (EFFEXOR XR) 150 MG extended release capsule  Take 1 capsule by mouth daily (with breakfast)                 Diet:  No diet orders on file , Advance as tolerated     Activity:  As tolerated    Consultants: IP CONSULT TO INFECTIOUS DISEASES  IP CONSULT TO IV TEAM  IP CONSULT TO PLASTIC SURGERY    Procedures:  Not indicated     Diagnostic Test:   Results for orders placed or performed during the hospital encounter of 03/11/21   COVID-19, Rapid    Specimen: Nasopharyngeal Swab   Result Value Ref Range    Specimen Description . NASOPHARYNGEAL SWAB     SARS-CoV-2, Rapid Not Detected Not Detected   CBC Auto Differential   Result Value Ref Range    WBC 5.3 3.5 - 11.3 k/uL    RBC 3.11 (L) 3.95 - 5.11 m/uL    Hemoglobin 9.7 (L) 11.9 - 15.1 g/dL    Hematocrit 31.9 (L) 36.3 - 47.1 %    .6 82.6 - 102.9 fL    MCH 31.2 25.2 - 33.5 pg    MCHC 30.4 28.4 - 34.8 g/dL    RDW 14.1 11.8 - 14.4 %    Platelets See Reflexed IPF Result 138 - 453 k/uL MPV NOT REPORTED 8.1 - 13.5 fL    NRBC Automated 0.0 0.0 per 100 WBC    Differential Type NOT REPORTED     WBC Morphology NOT REPORTED     RBC Morphology NOT REPORTED     Platelet Estimate NOT REPORTED     Seg Neutrophils 50 36 - 65 %    Lymphocytes 38 24 - 43 %    Monocytes 8 3 - 12 %    Eosinophils % 4 1 - 4 %    Basophils 1 0 - 2 %    Immature Granulocytes 0 0 %    Segs Absolute 2.64 1.50 - 8.10 k/uL    Absolute Lymph # 1.99 1.10 - 3.70 k/uL    Absolute Mono # 0.42 0.10 - 1.20 k/uL    Absolute Eos # 0.22 0.00 - 0.44 k/uL    Basophils Absolute 0.03 0.00 - 0.20 k/uL    Absolute Immature Granulocyte <0.03 0.00 - 0.30 k/uL   Basic Metabolic Panel w/ Reflex to MG   Result Value Ref Range    Glucose 102 (H) 70 - 99 mg/dL    BUN 12 6 - 20 mg/dL    CREATININE 0.73 0.50 - 0.90 mg/dL    Bun/Cre Ratio NOT REPORTED 9 - 20    Calcium 9.1 8.6 - 10.4 mg/dL    Sodium 140 135 - 144 mmol/L    Potassium 4.2 3.7 - 5.3 mmol/L    Chloride 103 98 - 107 mmol/L    CO2 24 20 - 31 mmol/L    Anion Gap 13 9 - 17 mmol/L    GFR Non-African American >60 >60 mL/min    GFR African American >60 >60 mL/min    GFR Comment          GFR Staging NOT REPORTED    C-REACTIVE PROTEIN   Result Value Ref Range    CRP 6.1 (H) 0.0 - 5.0 mg/L   Sedimentation Rate   Result Value Ref Range    Sed Rate 102 (H) 0 - 30 mm   Immature Platelet Fraction   Result Value Ref Range    Platelet, Immature Fraction 2.2 1.1 - 10.3 %    Platelet, Fluorescence 341 138 - 453 k/uL   CBC WITH AUTO DIFFERENTIAL   Result Value Ref Range    WBC 4.9 3.5 - 11.3 k/uL    RBC 2.81 (L) 3.95 - 5.11 m/uL    Hemoglobin 8.8 (L) 11.9 - 15.1 g/dL    Hematocrit 29.6 (L) 36.3 - 47.1 %    .3 (H) 82.6 - 102.9 fL    MCH 31.3 25.2 - 33.5 pg    MCHC 29.7 28.4 - 34.8 g/dL    RDW 14.0 11.8 - 14.4 %    Platelets 381 375 - 276 k/uL    MPV 9.7 8.1 - 13.5 fL    NRBC Automated 0.0 0.0 per 100 WBC    Differential Type NOT REPORTED     Seg Neutrophils 41 36 - 65 %    Lymphocytes 40 24 - 43 %    Monocytes 14 (H) 3 - 12 %    Eosinophils % 4 1 - 4 %    Basophils 1 0 - 2 %    Immature Granulocytes 0 0 %    Segs Absolute 2.00 1.50 - 8.10 k/uL    Absolute Lymph # 1.99 1.10 - 3.70 k/uL    Absolute Mono # 0.66 0.10 - 1.20 k/uL    Absolute Eos # 0.20 0.00 - 0.44 k/uL    Basophils Absolute 0.04 0.00 - 0.20 k/uL    Absolute Immature Granulocyte <0.03 0.00 - 0.30 k/uL    WBC Morphology NOT REPORTED     RBC Morphology MACROCYTOSIS PRESENT     Platelet Estimate NOT REPORTED    Basic Metabolic Panel w/ Reflex to MG   Result Value Ref Range    Glucose 203 (H) 70 - 99 mg/dL    BUN 12 6 - 20 mg/dL    CREATININE 0.75 0.50 - 0.90 mg/dL    Bun/Cre Ratio NOT REPORTED 9 - 20    Calcium 8.7 8.6 - 10.4 mg/dL    Sodium 138 135 - 144 mmol/L    Potassium 4.1 3.7 - 5.3 mmol/L    Chloride 103 98 - 107 mmol/L    CO2 22 20 - 31 mmol/L    Anion Gap 13 9 - 17 mmol/L    GFR Non-African American >60 >60 mL/min    GFR African American >60 >60 mL/min    GFR Comment          GFR Staging NOT REPORTED    Sedimentation Rate   Result Value Ref Range    Sed Rate 55 (H) 0 - 30 mm   C-REACTIVE PROTEIN   Result Value Ref Range    CRP 4.2 0.0 - 5.0 mg/L   CBC WITH AUTO DIFFERENTIAL   Result Value Ref Range    WBC 4.5 3.5 - 11.3 k/uL    RBC 2.85 (L) 3.95 - 5.11 m/uL    Hemoglobin 8.9 (L) 11.9 - 15.1 g/dL    Hematocrit 29.5 (L) 36.3 - 47.1 %    .5 (H) 82.6 - 102.9 fL    MCH 31.2 25.2 - 33.5 pg    MCHC 30.2 28.4 - 34.8 g/dL    RDW 14.0 11.8 - 14.4 %    Platelets 986 652 - 669 k/uL    MPV 10.0 8.1 - 13.5 fL    NRBC Automated 0.0 0.0 per 100 WBC    Differential Type NOT REPORTED     Seg Neutrophils 48 36 - 65 %    Lymphocytes 34 24 - 43 %    Monocytes 13 (H) 3 - 12 %    Eosinophils % 4 1 - 4 %    Basophils 1 0 - 2 %    Immature Granulocytes 0 0 %    Segs Absolute 2.13 1.50 - 8.10 k/uL    Absolute Lymph # 1.53 1.10 - 3.70 k/uL    Absolute Mono # 0.58 0.10 - 1.20 k/uL    Absolute Eos # 0.20 0.00 - 0.44 k/uL    Basophils Absolute 0.04 0.00 - 0.20 k/uL    Absolute Immature Granulocyte <0.03 0.00 - 0.30 k/uL    WBC Morphology NOT REPORTED     RBC Morphology MACROCYTOSIS PRESENT     Platelet Estimate NOT REPORTED    CBC WITH AUTO DIFFERENTIAL   Result Value Ref Range    WBC 5.6 3.5 - 11.3 k/uL    RBC 2.86 (L) 3.95 - 5.11 m/uL    Hemoglobin 9.0 (L) 11.9 - 15.1 g/dL    Hematocrit 29.9 (L) 36.3 - 47.1 %    .5 (H) 82.6 - 102.9 fL    MCH 31.5 25.2 - 33.5 pg    MCHC 30.1 28.4 - 34.8 g/dL    RDW 13.9 11.8 - 14.4 %    Platelets 540 973 - 994 k/uL    MPV 10.6 8.1 - 13.5 fL    NRBC Automated 0.0 0.0 per 100 WBC    Differential Type NOT REPORTED     Seg Neutrophils 63 36 - 65 %    Lymphocytes 25 24 - 43 %    Monocytes 9 3 - 12 %    Eosinophils % 2 1 - 4 %    Basophils 1 0 - 2 %    Immature Granulocytes 0 0 %    Segs Absolute 3.56 1.50 - 8.10 k/uL    Absolute Lymph # 1.42 1.10 - 3.70 k/uL    Absolute Mono # 0.50 0.10 - 1.20 k/uL    Absolute Eos # 0.09 0.00 - 0.44 k/uL    Basophils Absolute 0.03 0.00 - 0.20 k/uL    Absolute Immature Granulocyte <0.03 0.00 - 0.30 k/uL    WBC Morphology NOT REPORTED     RBC Morphology MACROCYTOSIS PRESENT     Platelet Estimate NOT REPORTED    CBC WITH AUTO DIFFERENTIAL   Result Value Ref Range    WBC 4.4 3.5 - 11.3 k/uL    RBC 2.91 (L) 3.95 - 5.11 m/uL    Hemoglobin 9.0 (L) 11.9 - 15.1 g/dL    Hematocrit 30.4 (L) 36.3 - 47.1 %    .5 (H) 82.6 - 102.9 fL    MCH 30.9 25.2 - 33.5 pg    MCHC 29.6 28.4 - 34.8 g/dL    RDW 14.0 11.8 - 14.4 %    Platelets 535 343 - 458 k/uL    MPV 10.2 8.1 - 13.5 fL    NRBC Automated 0.0 0.0 per 100 WBC    Differential Type NOT REPORTED     Seg Neutrophils 43 36 - 65 %    Lymphocytes 41 24 - 43 %    Monocytes 10 3 - 12 %    Eosinophils % 4 1 - 4 %    Basophils 1 0 - 2 %    Immature Granulocytes 1 (H) 0 %    Segs Absolute 1.95 1.50 - 8.10 k/uL    Absolute Lymph # 1.78 1.10 - 3.70 k/uL    Absolute Mono # 0.43 0.10 - 1.20 k/uL    Absolute Eos # 0.19 0.00 - 0.44 k/uL    Basophils Absolute <0.03 0.00 - 0.20 k/uL    Absolute Immature Granulocyte <0.03 0.00 - 0.30 k/uL    WBC Morphology NOT REPORTED     RBC Morphology MACROCYTOSIS PRESENT     Platelet Estimate NOT REPORTED    Surgical Pathology   Result Value Ref Range    Surgical Pathology Report       -- Diagnosis --  FINGER, AMPUTATION (LEFT RING DISTAL):       -DEEP CUTANEOUS ACUTE INFLAMMATION (CELLULITIS). -ACUTE INFLAMMATION INVOLVING BONE (OSTEOMYELITIS). Delmar Carrion M.D.  **Electronically Signed Out**         [facility]/3/16/2021       Clinical Information  Pre-op Diagnosis:  FINGER TRAUMA   Operative Findings:  LEFT RING FINGER R/O OSTEOMYELITIS  Operation Performed:  AMPUTATION    Source of Specimen  1: LEFT RING FINGER    Gross Description  \"ABILIO BRAYLOCK, LEFT RING FINGER R/O OSTEOMYELITIS\" 3.0 x 2.5 x 1.5  cm portion of disarticulated distal digit and separate portion of soft  tissue, 1.5 x 1.5 x 0.5 cm. The margin of the digit is slightly  disrupted and inked black. The digit is mostly necrotic and the  necrosis extends to the margin. The subjacent bone is slightly soft. Representative sections 1cs after decalcification with margins  perpendicular. tm      Microscopic Description  Microscopic examination performed. SURGICAL PATHOLOGY CONSULTATION        Patient Name: Jermaine Guo Med Rec: [id number]  Path Number: [id number]    [facility]  ANATOMIC PATHOLOGY  [address] [place] [address].   [place], [address]  [phone]  Fax: [phone]     POC Glucose Fingerstick   Result Value Ref Range    POC Glucose 208 (H) 65 - 105 mg/dL   POC Glucose Fingerstick   Result Value Ref Range    POC Glucose 246 (H) 65 - 105 mg/dL   POC Glucose Fingerstick   Result Value Ref Range    POC Glucose 181 (H) 65 - 105 mg/dL   POC Glucose Fingerstick   Result Value Ref Range    POC Glucose 196 (H) 65 - 105 mg/dL   POC Glucose Fingerstick   Result Value Ref Range    POC Glucose 175 (H) 65 - 105 mg/dL   POC Glucose Fingerstick Result Value Ref Range    POC Glucose 154 (H) 65 - 105 mg/dL   POC Glucose Fingerstick   Result Value Ref Range    POC Glucose 322 (H) 65 - 105 mg/dL   POC Glucose Fingerstick   Result Value Ref Range    POC Glucose 261 (H) 65 - 105 mg/dL   POC Glucose Fingerstick   Result Value Ref Range    POC Glucose 300 (H) 65 - 105 mg/dL   POC Glucose Fingerstick   Result Value Ref Range    POC Glucose 158 (H) 65 - 105 mg/dL   POC Glucose Fingerstick   Result Value Ref Range    POC Glucose 238 (H) 65 - 105 mg/dL   POC Glucose Fingerstick   Result Value Ref Range    POC Glucose 304 (H) 65 - 105 mg/dL   POC Glucose Fingerstick   Result Value Ref Range    POC Glucose 186 (H) 65 - 105 mg/dL   POC Glucose Fingerstick   Result Value Ref Range    POC Glucose 269 (H) 65 - 105 mg/dL   POC Glucose Fingerstick   Result Value Ref Range    POC Glucose 155 (H) 65 - 105 mg/dL     Xr Elbow Left (min 3 Views)    Result Date: 2/23/2021  EXAMINATION: THREE XRAY VIEWS OF THE LEFT ELBOW 2/23/2021 4:49 pm COMPARISON: None. HISTORY: ORDERING SYSTEM PROVIDED HISTORY: Fall TECHNOLOGIST PROVIDED HISTORY: Fall Acuity: Acute Type of Exam: Initial 53-year-old female with acute left elbow pain after a fall FINDINGS: Osseous alignment is normal.  Joint spaces are well maintained. No acute fracture or gross dislocation is seen. The radial head and radial neck appear intact. There is no significant elevation of the posterior fat pad or sail sign to suggest a joint effusion. No acute fracture or dislocation.      Xr Hand Left (min 3 Views)    Result Date: 3/11/2021  EXAMINATION: THREE XRAY VIEWS OF THE LEFT HAND 3/11/2021 4:29 pm COMPARISON: Three-view study of the left hand from 02/23/2021 HISTORY: ORDERING SYSTEM PROVIDED HISTORY: 4th digit distal tip infection TECHNOLOGIST PROVIDED HISTORY: 4th digit distal tip infection FINDINGS: Interval worsening soft tissue swelling tip 4th digit, now with contour irregularity; in addition, along anterior aspect distal phalanx, cortical erosion/adjacent bone or periosteal new bone now identified, suspicious for osteomyelitis. No soft tissue gas or radiopaque FB Again noted are interphalangeal degenerative changes, especially DIPs and 5th metacarpal phalangeal joint; calcium deposit lateral to the 2nd DIP again noted. Mild degenerative changes triscaphe joints. Vascular calcifications. Swelling tip 4th digit compatible with the history of infection, with findings concerning for osteomyelitis involving the palmar aspect DP. No radiopaque foreign body. Similar degenerative changes. Vascular calcifications. Xr Hand Left (min 3 Views)    Result Date: 2/23/2021  EXAMINATION: THREE XRAY VIEWS OF THE LEFT HAND 2/23/2021 1:49 pm COMPARISON: None. HISTORY: ORDERING SYSTEM PROVIDED HISTORY: Possible felon after a fall TECHNOLOGIST PROVIDED HISTORY: Possible felon after a fall Reason for Exam: pain to pinky and ringer fingers Acuity: Acute Type of Exam: Initial FINDINGS: The visualized bones are normal. There is no evidence of fracture or dislocation. Periarticular calcification along the DIP joint of the 2nd digit. The remaining joint spaces appear well maintained. The soft tissues are unremarkable. Vascular calcifications are seen compatible with atherosclerotic disease. No acute bony abnormalities are noted     Xr Foot Left (min 3 Views)    Result Date: 2/24/2021  EXAMINATION: THREE XRAY VIEWS OF THE LEFT FOOT 2/24/2021 10:50 am COMPARISON: Left foot radiographs performed 10/01/2018. HISTORY: ORDERING SYSTEM PROVIDED HISTORY: foot pain, infection TECHNOLOGIST PROVIDED HISTORY: foot pain, infection FINDINGS: There has been prior amputation of the 4th digit to the base of proximal phalanx. There is been prior amputation of the 5th digit to the base of the metatarsal.  There is no acute osseous abnormality. The remaining joint spaces are maintained. There is mild soft tissue swelling.      No acute osseous or soft DIP joint. 2.  Soft tissue swelling and edema with deep ulceration at the palmar aspect of the distal 4th finger. No evidence of abscess. Physical Exam:    General appearance - NAD, AOx 3   Lungs -CTAB, no R/R/R  Heart - RRR, no M/R/G  Abdomen - Soft, NT/ND  Neurological:  MAEx4, No focal motor deficit, sensory loss  Extremities - Cap refil <2 sec in all ext., no edema  Skin -warm, dry      Hospital Course:  Clinical course has improved, labs and imaging reviewed. Kalia Murcia originally presented to the hospital on 3/11/2021  3:38 PM. with past medical history of diabetes who presents left fourth digit pain of several weeks duration after felon drainage.  Has been noncompliant with outpatient antibiotic regimen.  ED work-up demonstrated hand x-ray concerning for signs of osteomyelitis and elevated inflammatory markers.  ID has been consulted and is following.  Started on ceftaroline and Flagyl.    MRI demonstrated osteomyelitis. Seen by plastic surgery recommended distal finger amputation now POD 2. Dressings were taken down on POD 2 and ID recommended no further antibiotic therapy. At that time it was determined that She required further observation and above stated w/u. She was admitted and labs and imaging were followed daily. Imaging results as above. She is medically stable to be discharged. Disposition: Home    Patient stated that they will not drive themselves home from the hospital if they have gotten pain killers/ narcotics earlier that day and that they will arrange for transportation on their own or work with the  for a ride. Patient counseled NOT to drive while under the influence of narcotics/ pain killers. Condition: Good    Patient stable and ready for discharge home. I have discussed plan of care with patient and they are in understanding. They were instructed to read discharge paperwork. All of their questions and concerns were addressed. Time Spent: 3 day      --  Srinath Butler DO  Emergency Medicine Resident Physician    This dictation was generated by voice recognition computer software. Although all attempts are made to edit the dictation for accuracy, there may be errors in the transcription that are not intended.

## 2021-03-15 NOTE — CARE COORDINATION
Transition planning  Spoke with Silvano Melgoza in admissions to check if pt able to return to facility. [place] Rhona states yes  they are waiting for her just call when we know when she will be returning to facility. Transportation set up through [facility] for a cab as pt wishes- madeleine pick her up in 1 hr  Notified pt. [id number] Aminah Gallardo will be returning in next1-2 hr. Number for report is [phone] ask for [employer]  Faxed H&P ,[facility] Record, Script  And AVS to them . Pt informed and agreeable . Nurse Natalya informed  [phone] unable to reach anyone to verify that pt was going to be picked up.  Scheduled a black and white to pick pt up-confirmation number [id number] Nurse Adilson Bass given information

## 2021-03-15 NOTE — PLAN OF CARE
Problem: Falls - Risk of:  Goal: Will remain free from falls  Description: Will remain free from falls  3/15/2021 0509 by Jayda Razo RN  Outcome: Met This Shift  3/14/2021 1609 by David Lofton RN  Outcome: Ongoing  Goal: Absence of physical injury  Description: Absence of physical injury  3/15/2021 0509 by Jayda Razo RN  Outcome: Met This Shift  3/14/2021 1609 by David Lofton RN  Outcome: Ongoing     Problem: Pain:  Goal: Pain level will decrease  Description: Pain level will decrease  3/15/2021 0509 by Jayda Razo RN  Outcome: Ongoing  3/14/2021 1609 by David Lofton RN  Outcome: Ongoing  Goal: Control of acute pain  Description: Control of acute pain  3/15/2021 0509 by Jadya Razo RN  Outcome: Ongoing  3/14/2021 1609 by David Lofton RN  Outcome: Ongoing  Goal: Control of chronic pain  Description: Control of chronic pain  3/15/2021 0509 by Jayda Razo RN  Outcome: Ongoing  3/14/2021 1609 by David Lofton RN  Outcome: Ongoing

## 2021-03-16 LAB — SURGICAL PATHOLOGY REPORT: NORMAL

## 2021-03-18 NOTE — DISCHARGE SUMMARY
CDU Discharge Summary        Patient:  Amos Zhao  YOB: 1965    MRN: [id number]   Acct: [de-identified]    Primary Care Physician: Navdeep Duran MD    Admit date:  3/11/2021  3:38 PM  Discharge date: 3/15/2021  6:47 PM      Discharge Diagnoses:     1.)  Chronic osteomyelitis with outpatient treatment failure. Admitted for IV antibiotics and evaluation by hand surgery. Patient treated with IV antibiotics and surgical excision. Follow-up:  Call today/tomorrow for a follow up appointment with Navdeep Duran MD , or return to the Emergency Room with worsening symptoms    Stressed to patient the importance of following up with primary care doctor for further workup/management of symptoms. Pt verbalizes understanding and agrees with plan. Discharge Medication Changes:       Medication List      START taking these medications    Betadine Swab Aid 10 % Swab  Generic drug: Povidone-Iodine Scrub Sponge  Apply 1 Stick topically 2 times daily     oxyCODONE 5 MG immediate release tablet  Commonly known as: ROXICODONE  Take 1 tablet by mouth every 6 hours as needed for Pain for up to 3 days.   Notes to patient: Your last dose was 3/15/2021 @ 1:07pm        CONTINUE taking these medications    acetaminophen 325 MG tablet  Commonly known as: TYLENOL  TAKE 2 TABLETS BY MOUTH EVERY 6 HOURS AS NEEDED FOR PAIN     albuterol sulfate  (90 Base) MCG/ACT inhaler  Inhale 2 puffs into the lungs every 4 hours as needed for Wheezing     Alcohol Prep 70 % Pads  1 each by Does not apply route as needed (use as needed) Use_____times per day  Diagnosis: 250.0   Diabetes ___Insulin-Dependent___Non-Insulin Dependent     benzonatate 200 MG capsule  Commonly known as: TESSALON     budesonide-formoterol 80-4.5 MCG/ACT Aero  Commonly known as: Symbicort  Inhale 2 puffs into the lungs 2 times daily     celecoxib 200 MG capsule  Commonly known as: CELEBREX  Notes to patient: Resume your previous schedule cetirizine 10 MG tablet  Commonly known as: ZYRTEC  Take 1 tablet by mouth daily     dicyclomine 20 MG tablet  Commonly known as: Bentyl  Take 1 tablet by mouth 3 times daily as needed (for abdominal discomfort)      MG capsule  Generic drug: docusate sodium  TAKE 1 CAPSULE BY MOUTH TWICE DAILY     Flovent  MCG/ACT inhaler  Generic drug: fluticasone  Inhale 2 puffs into the lungs 2 times daily     FreeStyle Lancets Misc  1 each by Does not apply route 3 times daily     FREESTYLE LITE strip  Generic drug: blood glucose test strips  1 each by Does not apply route 3 times daily     gabapentin 300 MG capsule  Commonly known as: NEURONTIN  TAKE 3 CAPSULES 900 MG BY MOUTH THREE TIMES DAILY     [employer] Pen [place] 31G 31G X 8 MM Misc  Generic drug: Insulin Pen Needle  1 each by Does not apply route daily     Lantus SoloStar 100 UNIT/ML injection pen  Generic drug: insulin glargine  Inject 15 Units into the skin nightly     melatonin 3 MG Tabs tablet  Commonly known as: RA Melatonin  Take 1 tablet by mouth daily     metFORMIN 1000 MG tablet  Commonly known as: GLUCOPHAGE  TAKE 1 TABLET BY MOUTH DAILY WITH BREAKFAST     Misc.  Devices Misc  1 pair of dm shoes, 3 accommodated inserts     omeprazole 20 MG delayed release capsule  Commonly known as: PRILOSEC  Take 1 capsule by mouth Daily     traZODone 150 MG tablet  Commonly known as: DESYREL  Take 1 tablet by mouth nightly     venlafaxine 150 MG extended release capsule  Commonly known as: EFFEXOR XR  Take 1 capsule by mouth daily (with breakfast)        STOP taking these medications    cephALEXin 500 MG capsule  Commonly known as: KEFLEX     doxycycline hyclate 100 MG tablet  Commonly known as: VIBRA-TABS     metroNIDAZOLE 500 MG tablet  Commonly known as: FLAGYL           Where to Get Your Medications      These medications were sent to [facility] [address], [address] [facility]  [address], [place] [address]    Phone: [phone]   · Betadine Swab Aid 10 % Swab     You can get these medications from any pharmacy    Bring a paper prescription for each of these medications  · oxyCODONE 5 MG immediate release tablet         Diet:  No diet orders on file, advance as tolerated     Activity:  As tolerated    Consultants: IP CONSULT TO INFECTIOUS DISEASES  IP CONSULT TO IV TEAM  IP CONSULT TO PLASTIC SURGERY    Procedures: See note from plastic surgery    Diagnostic Test:   Results for orders placed or performed during the hospital encounter of 03/11/21   COVID-19, Rapid    Specimen: Nasopharyngeal Swab   Result Value Ref Range    Specimen Description . NASOPHARYNGEAL SWAB     SARS-CoV-2, Rapid Not Detected Not Detected   CBC Auto Differential   Result Value Ref Range    WBC 5.3 3.5 - 11.3 k/uL    RBC 3.11 (L) 3.95 - 5.11 m/uL    Hemoglobin 9.7 (L) 11.9 - 15.1 g/dL    Hematocrit 31.9 (L) 36.3 - 47.1 %    .6 82.6 - 102.9 fL    MCH 31.2 25.2 - 33.5 pg    MCHC 30.4 28.4 - 34.8 g/dL    RDW 14.1 11.8 - 14.4 %    Platelets See Reflexed IPF Result 138 - 453 k/uL    MPV NOT REPORTED 8.1 - 13.5 fL    NRBC Automated 0.0 0.0 per 100 WBC    Differential Type NOT REPORTED     WBC Morphology NOT REPORTED     RBC Morphology NOT REPORTED     Platelet Estimate NOT REPORTED     Seg Neutrophils 50 36 - 65 %    Lymphocytes 38 24 - 43 %    Monocytes 8 3 - 12 %    Eosinophils % 4 1 - 4 %    Basophils 1 0 - 2 %    Immature Granulocytes 0 0 %    Segs Absolute 2.64 1.50 - 8.10 k/uL    Absolute Lymph # 1.99 1.10 - 3.70 k/uL    Absolute Mono # 0.42 0.10 - 1.20 k/uL    Absolute Eos # 0.22 0.00 - 0.44 k/uL    Basophils Absolute 0.03 0.00 - 0.20 k/uL    Absolute Immature Granulocyte <0.03 0.00 - 0.30 k/uL   Basic Metabolic Panel w/ Reflex to MG   Result Value Ref Range    Glucose 102 (H) 70 - 99 mg/dL    BUN 12 6 - 20 mg/dL    CREATININE 0.73 0.50 - 0.90 mg/dL    Bun/Cre Ratio NOT REPORTED 9 - 20    Calcium 9.1 8.6 - 10.4 mg/dL    Sodium 140 135 - 144 mmol/L    Potassium 4.2 3.7 - 5.3 mmol/L    Chloride 103 98 - 107 mmol/L    CO2 24 20 - 31 mmol/L    Anion Gap 13 9 - 17 mmol/L    GFR Non-African American >60 >60 mL/min    GFR African American >60 >60 mL/min    GFR Comment          GFR Staging NOT REPORTED    C-REACTIVE PROTEIN   Result Value Ref Range    CRP 6.1 (H) 0.0 - 5.0 mg/L   Sedimentation Rate   Result Value Ref Range    Sed Rate 102 (H) 0 - 30 mm   Immature Platelet Fraction   Result Value Ref Range    Platelet, Immature Fraction 2.2 1.1 - 10.3 %    Platelet, Fluorescence 341 138 - 453 k/uL   CBC WITH AUTO DIFFERENTIAL   Result Value Ref Range    WBC 4.9 3.5 - 11.3 k/uL    RBC 2.81 (L) 3.95 - 5.11 m/uL    Hemoglobin 8.8 (L) 11.9 - 15.1 g/dL    Hematocrit 29.6 (L) 36.3 - 47.1 %    .3 (H) 82.6 - 102.9 fL    MCH 31.3 25.2 - 33.5 pg    MCHC 29.7 28.4 - 34.8 g/dL    RDW 14.0 11.8 - 14.4 %    Platelets 430 811 - 610 k/uL    MPV 9.7 8.1 - 13.5 fL    NRBC Automated 0.0 0.0 per 100 WBC    Differential Type NOT REPORTED     Seg Neutrophils 41 36 - 65 %    Lymphocytes 40 24 - 43 %    Monocytes 14 (H) 3 - 12 %    Eosinophils % 4 1 - 4 %    Basophils 1 0 - 2 %    Immature Granulocytes 0 0 %    Segs Absolute 2.00 1.50 - 8.10 k/uL    Absolute Lymph # 1.99 1.10 - 3.70 k/uL    Absolute Mono # 0.66 0.10 - 1.20 k/uL    Absolute Eos # 0.20 0.00 - 0.44 k/uL    Basophils Absolute 0.04 0.00 - 0.20 k/uL    Absolute Immature Granulocyte <0.03 0.00 - 0.30 k/uL    WBC Morphology NOT REPORTED     RBC Morphology MACROCYTOSIS PRESENT     Platelet Estimate NOT REPORTED    Basic Metabolic Panel w/ Reflex to MG   Result Value Ref Range    Glucose 203 (H) 70 - 99 mg/dL    BUN 12 6 - 20 mg/dL    CREATININE 0.75 0.50 - 0.90 mg/dL    Bun/Cre Ratio NOT REPORTED 9 - 20    Calcium 8.7 8.6 - 10.4 mg/dL    Sodium 138 135 - 144 mmol/L    Potassium 4.1 3.7 - 5.3 mmol/L    Chloride 103 98 - 107 mmol/L    CO2 22 20 - 31 mmol/L    Anion Gap 13 9 - 17 mmol/L    GFR Non-African American >60 >60 mL/min    GFR African American >60 >60 mL/min    GFR Comment          GFR Staging NOT REPORTED    Sedimentation Rate   Result Value Ref Range    Sed Rate 55 (H) 0 - 30 mm   C-REACTIVE PROTEIN   Result Value Ref Range    CRP 4.2 0.0 - 5.0 mg/L   CBC WITH AUTO DIFFERENTIAL   Result Value Ref Range    WBC 4.5 3.5 - 11.3 k/uL    RBC 2.85 (L) 3.95 - 5.11 m/uL    Hemoglobin 8.9 (L) 11.9 - 15.1 g/dL    Hematocrit 29.5 (L) 36.3 - 47.1 %    .5 (H) 82.6 - 102.9 fL    MCH 31.2 25.2 - 33.5 pg    MCHC 30.2 28.4 - 34.8 g/dL    RDW 14.0 11.8 - 14.4 %    Platelets 962 172 - 762 k/uL    MPV 10.0 8.1 - 13.5 fL    NRBC Automated 0.0 0.0 per 100 WBC    Differential Type NOT REPORTED     Seg Neutrophils 48 36 - 65 %    Lymphocytes 34 24 - 43 %    Monocytes 13 (H) 3 - 12 %    Eosinophils % 4 1 - 4 %    Basophils 1 0 - 2 %    Immature Granulocytes 0 0 %    Segs Absolute 2.13 1.50 - 8.10 k/uL    Absolute Lymph # 1.53 1.10 - 3.70 k/uL    Absolute Mono # 0.58 0.10 - 1.20 k/uL    Absolute Eos # 0.20 0.00 - 0.44 k/uL    Basophils Absolute 0.04 0.00 - 0.20 k/uL    Absolute Immature Granulocyte <0.03 0.00 - 0.30 k/uL    WBC Morphology NOT REPORTED     RBC Morphology MACROCYTOSIS PRESENT     Platelet Estimate NOT REPORTED    CBC WITH AUTO DIFFERENTIAL   Result Value Ref Range    WBC 5.6 3.5 - 11.3 k/uL    RBC 2.86 (L) 3.95 - 5.11 m/uL    Hemoglobin 9.0 (L) 11.9 - 15.1 g/dL    Hematocrit 29.9 (L) 36.3 - 47.1 %    .5 (H) 82.6 - 102.9 fL    MCH 31.5 25.2 - 33.5 pg    MCHC 30.1 28.4 - 34.8 g/dL    RDW 13.9 11.8 - 14.4 %    Platelets 028 871 - 925 k/uL    MPV 10.6 8.1 - 13.5 fL    NRBC Automated 0.0 0.0 per 100 WBC    Differential Type NOT REPORTED     Seg Neutrophils 63 36 - 65 %    Lymphocytes 25 24 - 43 %    Monocytes 9 3 - 12 %    Eosinophils % 2 1 - 4 %    Basophils 1 0 - 2 %    Immature Granulocytes 0 0 %    Segs Absolute 3.56 1.50 - 8.10 k/uL    Absolute Lymph # 1.42 1.10 - 3.70 k/uL    Absolute Mono # 0.50 0.10 - The margin of the digit is slightly  disrupted and inked black. The digit is mostly necrotic and the  necrosis extends to the margin. The subjacent bone is slightly soft. Representative sections 1cs after decalcification with margins  perpendicular. tm      Microscopic Description  Microscopic examination performed. SURGICAL PATHOLOGY CONSULTATION        Patient Name: Rach Damon [facility] Rec: [id number]  Path Number: [id number]    [employer]  [facility]  ANATOMIC PATHOLOGY  [address] [place] [address], [place] [address].   [address], [address]  [phone]  Fax: [phone]     POC Glucose Fingerstick   Result Value Ref Range    POC Glucose 208 (H) 65 - 105 mg/dL   POC Glucose Fingerstick   Result Value Ref Range    POC Glucose 246 (H) 65 - 105 mg/dL   POC Glucose Fingerstick   Result Value Ref Range    POC Glucose 181 (H) 65 - 105 mg/dL   POC Glucose Fingerstick   Result Value Ref Range    POC Glucose 196 (H) 65 - 105 mg/dL   POC Glucose Fingerstick   Result Value Ref Range    POC Glucose 175 (H) 65 - 105 mg/dL   POC Glucose Fingerstick   Result Value Ref Range    POC Glucose 154 (H) 65 - 105 mg/dL   POC Glucose Fingerstick   Result Value Ref Range    POC Glucose 322 (H) 65 - 105 mg/dL   POC Glucose Fingerstick   Result Value Ref Range    POC Glucose 261 (H) 65 - 105 mg/dL   POC Glucose Fingerstick   Result Value Ref Range    POC Glucose 300 (H) 65 - 105 mg/dL   POC Glucose Fingerstick   Result Value Ref Range    POC Glucose 158 (H) 65 - 105 mg/dL   POC Glucose Fingerstick   Result Value Ref Range    POC Glucose 238 (H) 65 - 105 mg/dL   POC Glucose Fingerstick   Result Value Ref Range    POC Glucose 304 (H) 65 - 105 mg/dL   POC Glucose Fingerstick   Result Value Ref Range    POC Glucose 186 (H) 65 - 105 mg/dL   POC Glucose Fingerstick   Result Value Ref Range    POC Glucose 269 (H) 65 - 105 mg/dL   POC Glucose Fingerstick   Result Value Ref Range    POC Glucose 155 (H) 65 - 105 mg/dL     Xr with thickened intermediate signal nonenhancing tissue, likely necrotic tissue. No discrete abscess or sinus tract is identified. Flexor and extensor tendons appear to be intact. Pulleys are intact. Soft tissues throughout the remainder of the hand are within normal limits. Muscle signal is normal. BONE MARROW: There is marrow signal abnormality and enhancement throughout the entire 4th distal phalanx. There is also minimal T2 signal abnormality and enhancement in the distal 3rd of the 4th middle phalanx, without corresponding T1 signal abnormality. No significant joint effusion at the 4th DIP joint or elsewhere in the imaged hand. JOINTS: Joint spaces are grossly preserved. 1.  Findings compatible with osteomyelitis at the 4th distal phalanx. There is also minimal T2 signal and enhancement at the distal 3rd of the 4th middle phalanx, which could be reactive osteitis versus early osteomyelitis. No definitive MR findings of septic arthritis at the 4th DIP joint. 2.  Soft tissue swelling and edema with deep ulceration at the palmar aspect of the distal 4th finger. No evidence of abscess. Physical Exam:    General appearance - NAD, AOx 3    Lungs -CTAB, no R/R/R  Heart - RRR, no M/R/G  Abdomen - Soft, NT/ND  Neurological:  MAEx4, No focal motor deficit, sensory loss  Extremities - Cap refil <2 sec in all ext., no edema. Postsurgical bandage in place. Skin -warm, dry      Hospital Course:  Clinical course has improved, labs and imaging reviewed. Kriss Wakefield originally presented to the hospital on 3/11/2021  3:38 PM with outpatient treatment failure of osteomyelitis. At that time it was determined that She required further observation and IV antibiotics with evaluation by hand surgery. Hand surgeon took patient to the OR for debridement. Patient did well post surgery . Labs and imaging were followed daily. Imaging results as above. She is medically stable to be discharged. Disposition: Home    Patient stated that they will not drive themselves home from the hospital if they have gotten pain killers/ narcotics earlier that day and that they will arrange for transportation on their own or work with the  for a ride. Patient counseled NOT to drive while under the influence of narcotics/ pain killers. Condition: Good    Patient stable and ready for discharge home. I have discussed plan of care with patient and they are in understanding. They were instructed to read discharge paperwork. All of their questions and concerns were addressed. Time Spent: 3 day      --  Hai Mei MD  Emergency Medicine Attending Physician    This dictation was generated by voice recognition computer software. Although all attempts are made to edit the dictation for accuracy, there may be errors in the transcription that are not intended.

## 2021-03-25 ENCOUNTER — OFFICE VISIT (OUTPATIENT)
Dept: FAMILY MEDICINE CLINIC | Age: 56
End: 2021-03-25
Payer: COMMERCIAL

## 2021-03-25 VITALS
WEIGHT: 252 LBS | TEMPERATURE: 97.6 F | SYSTOLIC BLOOD PRESSURE: 138 MMHG | HEART RATE: 99 BPM | DIASTOLIC BLOOD PRESSURE: 80 MMHG | BODY MASS INDEX: 40.67 KG/M2

## 2021-03-25 DIAGNOSIS — Z48.02 ENCOUNTER FOR REMOVAL OF SUTURES: Primary | ICD-10-CM

## 2021-03-25 PROCEDURE — G8484 FLU IMMUNIZE NO ADMIN: HCPCS | Performed by: STUDENT IN AN ORGANIZED HEALTH CARE EDUCATION/TRAINING PROGRAM

## 2021-03-25 PROCEDURE — 1036F TOBACCO NON-USER: CPT | Performed by: STUDENT IN AN ORGANIZED HEALTH CARE EDUCATION/TRAINING PROGRAM

## 2021-03-25 PROCEDURE — 1111F DSCHRG MED/CURRENT MED MERGE: CPT | Performed by: STUDENT IN AN ORGANIZED HEALTH CARE EDUCATION/TRAINING PROGRAM

## 2021-03-25 PROCEDURE — G8427 DOCREV CUR MEDS BY ELIG CLIN: HCPCS | Performed by: STUDENT IN AN ORGANIZED HEALTH CARE EDUCATION/TRAINING PROGRAM

## 2021-03-25 PROCEDURE — 99213 OFFICE O/P EST LOW 20 MIN: CPT | Performed by: STUDENT IN AN ORGANIZED HEALTH CARE EDUCATION/TRAINING PROGRAM

## 2021-03-25 PROCEDURE — 3017F COLORECTAL CA SCREEN DOC REV: CPT | Performed by: STUDENT IN AN ORGANIZED HEALTH CARE EDUCATION/TRAINING PROGRAM

## 2021-03-25 PROCEDURE — G8417 CALC BMI ABV UP PARAM F/U: HCPCS | Performed by: STUDENT IN AN ORGANIZED HEALTH CARE EDUCATION/TRAINING PROGRAM

## 2021-03-25 ASSESSMENT — ENCOUNTER SYMPTOMS
VOMITING: 0
NAUSEA: 0
ABDOMINAL PAIN: 0
CONSTIPATION: 0
DIARRHEA: 0
COUGH: 0
SHORTNESS OF BREATH: 0
CHEST TIGHTNESS: 0
SORE THROAT: 0

## 2021-03-25 NOTE — PROGRESS NOTES
Subjective:    Annemarie Townsend is a 50 y.o. female with  has a past medical history of Acid reflux, Acute cystitis with hematuria, Acute non-recurrent maxillary sinusitis, Asthma, Bipolar 1 disorder ([address] Bipolar disorder, mixed ([address] BMI 34.0-34.9,adult, Cannabis use disorder, severe, dependence ([address] Cerebrovascular accident (CVA) ([address] Chest pain, Chronic renal insufficiency, Cocaine abuse ([address] DDD (degenerative disc disease), cervical, Diabetes mellitus ([address] Dizziness, Fibromyalgia, History of stroke, Homicidal ideation, Hyperglycemia, Hypertension, Hypotension, IDDM (insulin dependent diabetes mellitus), Lupus ([address] Migraine, Neuropathy, Neuropathy, Polysubstance abuse ([address] Post traumatic stress disorder (PTSD), Posttraumatic stress disorder, Recurrent depression ([address] Recurrent major depressive disorder, in partial remission ([address] Screening mammogram, encounter for, Severe recurrent major depression with psychotic features ([address] Severe recurrent major depression without psychotic features ([address] Stroke (cerebrum) ([address] Stroke ([address] Suicidal ideation, Suicidal intent, Vitamin D deficiency, and White matter changes. Family History   Problem Relation Age of Onset   Bonita Ruvalcaba Diabetes Mother     Stroke Mother    Bonita Ruvalcaba Diabetes Father     Diabetes Sister     Diabetes Brother     Other Son         GSW    No Known Problems Sister        Presented tothe office today for:  Chief Complaint   Patient presents with    Follow-Up from Hospital       HPI    Chief Complaint: removal of sutures   When did it happen? 2 weeks ago  Mechanism? Frost bite  Location: left ring finger amputation of distal phalanx  Intensity: 6/10  Quality: sharp  Radiation: to the elbow  Getting better, worse or staying the same?  better  Associated: No fever of chills  Treatments/Alleviating/Medications:   -Surgery on March 13  -IV Abx for 4-5 days while at the hospital  -Discharged on March 15        Review of Systems   Constitutional: Negative for chills, fatigue, fever and unexpected weight change. HENT: Negative for congestion, mouth sores and sore throat. Eyes: Negative for visual disturbance. Respiratory: Negative for cough, chest tightness and shortness of breath. Cardiovascular: Negative for chest pain and leg swelling. Gastrointestinal: Negative for abdominal pain, constipation, diarrhea, nausea and vomiting. Genitourinary: Negative for difficulty urinating. Musculoskeletal: Negative for joint swelling. Left ring finger suture removal   Skin: Negative for rash. Neurological: Negative for dizziness, weakness and headaches. Objective:    /80 (Site: Right Upper Arm, Position: Sitting, Cuff Size: Large Adult)   Pulse 99   Temp 97.6 °F (36.4 °C) (Temporal)   Wt 252 lb (114.3 kg)   LMP  (LMP Unknown)   BMI 40.67 kg/m²    BP Readings from Last 3 Encounters:   03/25/21 138/80   03/15/21 (!) 120/41   03/13/21 96/71     Physical Exam  Vitals signs and nursing note reviewed. Constitutional:       Appearance: She is well-developed. Cardiovascular:      Rate and Rhythm: Normal rate and regular rhythm. Heart sounds: Normal heart sounds. No murmur. No friction rub. No gallop. Pulmonary:      Effort: Pulmonary effort is normal. No respiratory distress. Breath sounds: Normal breath sounds. No wheezing or rales. Chest:      Chest wall: No tenderness. Abdominal:      General: Bowel sounds are normal. There is no distension. Palpations: Abdomen is soft. There is no mass. Tenderness: There is no abdominal tenderness. There is no guarding. Musculoskeletal:      Comments: Left ring finger distal phalanx amputation   Clean, dry and healed; no drainage, redness or warmth appreciated. Skin:     Capillary Refill: Capillary refill takes less than 2 seconds. Neurological:      Mental Status: She is alert and oriented to person, place, and time.            Lab Results   Component Value Date

## 2021-03-25 NOTE — PROGRESS NOTES
Visit Information    Have you changed or started any medications since your last visit including any over-the-counter medicines, vitamins, or herbal medicines? no   Have you stopped taking any of your medications? Is so, why? -  no  Are you having any side effects from any of your medications? - no    Have you seen any other physician or provider since your last visit?  no   Have you had any other diagnostic tests since your last visit?  no   Have you been seen in the emergency room and/or had an admission in a hospital since we last saw you?  no   Have you had your routine dental cleaning in the past 6 months?  no     Do you have an active MyChart account? If no, what is the barrier?   Yes    Patient Care Team:  Mary Zafar MD as PCP - General (Family Medicine)  Payton Billingsley MD as Consulting Physician (Infectious Diseases)  Sosa Jensen MD as Consulting Physician (Infectious Diseases)    Medical History Review  Past Medical, Family, and Social History reviewed and does not contribute to the patient presenting condition    Health Maintenance   Topic Date Due    Hepatitis C screen  Never done    Pneumococcal 0-64 years Vaccine (1 of 1 - PPSV23) Never done    Diabetic retinal exam  Never done    HIV screen  Never done    COVID-19 Vaccine (1) Never done    Hepatitis B vaccine (1 of 3 - Risk 3-dose series) Never done    Cervical cancer screen  Never done    Breast cancer screen  Never done    Shingles Vaccine (1 of 2) Never done    Colon cancer screen colonoscopy  Never done    Flu vaccine (1) 10/20/2021 (Originally 9/1/2020)    A1C test (Diabetic or Prediabetic)  04/13/2021    Diabetic microalbuminuria test  10/20/2021    Lipid screen  12/30/2021    Diabetic foot exam  01/22/2022    DTaP/Tdap/Td vaccine (3 - Td) 01/28/2030    Hepatitis A vaccine  Aged Out    Hib vaccine  Aged Out    Meningococcal (ACWY) vaccine  Aged Out

## 2021-03-25 NOTE — PATIENT INSTRUCTIONS
Thank you for letting us take care of you today. We hope all your questions were addressed. If a question was overlooked or something else comes to mind after you return home, please contact a member of your Care Team listed below. Your Care Team at [place] [address] is Team #4  Christina Mcmullen MD (Faculty)  Danyelle Loyola MD (Resident)  Silvio Poole MD (Resident)  Ivan Bella MD (Resident)  Lizbeth Brewster MD (Resident)  MATTHEW Garcia, MATTHEW Aguilar. MAREK Avendano ([facility] office)  Shefali Blanco, [address] (Clinical Practice Manager)  [facility] (Clinical Pharmacist)       Office phone number: [phone]    If you need to get in right away due to illness, please be advised we have \"Same Day\" appointments available Monday-Friday. Please call us at [phone] option #3 to schedule your \"Same Day\" appointment.

## 2021-03-29 NOTE — PROGRESS NOTES
Attending Physician Statement    Wt Readings from Last 3 Encounters:   03/25/21 252 lb (114.3 kg)   03/13/21 250 lb (113.4 kg)   03/01/21 250 lb (113.4 kg)     Temp Readings from Last 3 Encounters:   03/25/21 97.6 °F (36.4 °C) (Temporal)   03/15/21 97.6 °F (36.4 °C) (Oral)   03/01/21 97.7 °F (36.5 °C) (Temporal)     BP Readings from Last 3 Encounters:   03/25/21 138/80   03/15/21 (!) 120/41   03/13/21 96/71     Pulse Readings from Last 3 Encounters:   03/25/21 99   03/15/21 93   03/01/21 99         I have discussed the care of [address] [place] [address], including pertinent history and exam findings with the resident. I have reviewed the key elements of all parts of the encounter with the resident. I agree with the assessment, plan and orders as documented by the resident.   (GE Modifier)

## 2021-04-07 ENCOUNTER — TELEPHONE (OUTPATIENT)
Dept: FAMILY MEDICINE CLINIC | Age: 56
End: 2021-04-07

## 2021-04-07 NOTE — TELEPHONE ENCOUNTER
Writer spoke with patient to follow up from last appointment , see if she is still in rehab and schedule upcoming appointment. Patient started crying during conversation states she still is in rehab facility ([facility]) and the nurses at the facility are to be calling to schedule her next appointment. \"I cant do anything right now Im a mess my  just passed away. \" Voiced understanding with patient call completed.

## 2021-05-17 PROCEDURE — 87086 URINE CULTURE/COLONY COUNT: CPT

## 2021-05-17 PROCEDURE — 81001 URINALYSIS AUTO W/SCOPE: CPT

## 2021-05-18 ENCOUNTER — HOSPITAL ENCOUNTER (OUTPATIENT)
Age: 56
Setting detail: SPECIMEN
Discharge: HOME | End: 2021-05-18
Payer: COMMERCIAL

## 2021-05-18 LAB
-: ABNORMAL
AMORPHOUS: ABNORMAL
BACTERIA: ABNORMAL
BILIRUBIN URINE: NEGATIVE
CASTS UA: ABNORMAL /LPF (ref 0–2)
COLOR: YELLOW
COMMENT UA: ABNORMAL
CRYSTALS, UA: ABNORMAL /HPF
EPITHELIAL CELLS UA: ABNORMAL /HPF (ref 0–5)
GLUCOSE URINE: NEGATIVE
KETONES, URINE: NEGATIVE
LEUKOCYTE ESTERASE, URINE: ABNORMAL
MUCUS: ABNORMAL
NITRITE, URINE: NEGATIVE
OTHER OBSERVATIONS UA: ABNORMAL
PH UA: 6 (ref 5–8)
PROTEIN UA: NEGATIVE
RBC UA: ABNORMAL /HPF (ref 0–2)
RENAL EPITHELIAL, UA: ABNORMAL /HPF
SPECIFIC GRAVITY UA: 1.01 (ref 1–1.03)
TRICHOMONAS: ABNORMAL
TURBIDITY: CLEAR
URINE HGB: NEGATIVE
UROBILINOGEN, URINE: NORMAL
WBC UA: ABNORMAL /HPF (ref 0–5)
YEAST: ABNORMAL

## 2021-05-19 LAB
CULTURE: NORMAL
Lab: NORMAL
SPECIMEN DESCRIPTION: NORMAL

## 2021-06-07 ENCOUNTER — HOSPITAL ENCOUNTER (INPATIENT)
Age: 56
LOS: 5 days | Discharge: NURSING, REHAB OR LONG-TERM CARE | DRG: 420 | End: 2021-06-14
Attending: EMERGENCY MEDICINE | Admitting: FAMILY MEDICINE
Payer: COMMERCIAL

## 2021-06-07 DIAGNOSIS — R73.9 HYPERGLYCEMIA: ICD-10-CM

## 2021-06-07 DIAGNOSIS — G62.9 NEUROPATHY: ICD-10-CM

## 2021-06-07 DIAGNOSIS — M19.90 ARTHRITIS: Primary | ICD-10-CM

## 2021-06-07 DIAGNOSIS — E11.9 TYPE 2 DIABETES MELLITUS WITHOUT COMPLICATION, WITHOUT LONG-TERM CURRENT USE OF INSULIN (HCC): ICD-10-CM

## 2021-06-07 LAB
ABSOLUTE EOS #: 0.19 K/UL (ref 0–0.44)
ABSOLUTE IMMATURE GRANULOCYTE: 0.03 K/UL (ref 0–0.3)
ABSOLUTE LYMPH #: 1.71 K/UL (ref 1.1–3.7)
ABSOLUTE MONO #: 0.86 K/UL (ref 0.1–1.2)
ALBUMIN SERPL-MCNC: 3.6 G/DL (ref 3.5–5.2)
ALBUMIN/GLOBULIN RATIO: 0.9 (ref 1–2.5)
ALLEN TEST: ABNORMAL
ALP BLD-CCNC: 109 U/L (ref 35–104)
ALT SERPL-CCNC: 9 U/L (ref 5–33)
ANION GAP SERPL CALCULATED.3IONS-SCNC: 18 MMOL/L (ref 9–17)
ANION GAP: 10 MMOL/L (ref 7–16)
AST SERPL-CCNC: 9 U/L
BASOPHILS # BLD: 0 % (ref 0–2)
BASOPHILS ABSOLUTE: 0.03 K/UL (ref 0–0.2)
BETA-HYDROXYBUTYRATE: 2.49 MMOL/L (ref 0.02–0.27)
BILIRUB SERPL-MCNC: 0.33 MG/DL (ref 0.3–1.2)
BILIRUBIN URINE: NEGATIVE
BUN BLDV-MCNC: 33 MG/DL (ref 6–20)
BUN/CREAT BLD: ABNORMAL (ref 9–20)
C-REACTIVE PROTEIN: 30.4 MG/L (ref 0–5)
CALCIUM SERPL-MCNC: 8.9 MG/DL (ref 8.6–10.4)
CHLORIDE BLD-SCNC: 95 MMOL/L (ref 98–107)
CO2: 23 MMOL/L (ref 20–31)
COLOR: YELLOW
COMMENT UA: ABNORMAL
CREAT SERPL-MCNC: 1.06 MG/DL (ref 0.5–0.9)
DIFFERENTIAL TYPE: ABNORMAL
EOSINOPHILS RELATIVE PERCENT: 3 % (ref 1–4)
FIO2: ABNORMAL
GFR AFRICAN AMERICAN: >60 ML/MIN
GFR NON-AFRICAN AMERICAN: 47 ML/MIN
GFR NON-AFRICAN AMERICAN: 54 ML/MIN
GFR SERPL CREATININE-BSD FRML MDRD: 57 ML/MIN
GFR SERPL CREATININE-BSD FRML MDRD: ABNORMAL ML/MIN/{1.73_M2}
GLUCOSE BLD-MCNC: 186 MG/DL (ref 65–105)
GLUCOSE BLD-MCNC: 348 MG/DL (ref 65–105)
GLUCOSE BLD-MCNC: 490 MG/DL (ref 65–105)
GLUCOSE BLD-MCNC: 743 MG/DL (ref 70–99)
GLUCOSE BLD-MCNC: >700 MG/DL (ref 74–100)
GLUCOSE URINE: ABNORMAL
HCO3 VENOUS: 24.4 MMOL/L (ref 22–29)
HCT VFR BLD CALC: 33.2 % (ref 36.3–47.1)
HEMOGLOBIN: 10.2 G/DL (ref 11.9–15.1)
IMMATURE GRANULOCYTES: 0 %
KETONES, URINE: ABNORMAL
LEUKOCYTE ESTERASE, URINE: NEGATIVE
LIPASE: 193 U/L (ref 13–60)
LYMPHOCYTES # BLD: 23 % (ref 24–43)
MCH RBC QN AUTO: 30.6 PG (ref 25.2–33.5)
MCHC RBC AUTO-ENTMCNC: 30.7 G/DL (ref 28.4–34.8)
MCV RBC AUTO: 99.7 FL (ref 82.6–102.9)
MODE: ABNORMAL
MONOCYTES # BLD: 11 % (ref 3–12)
MYOGLOBIN: 114 NG/ML (ref 25–58)
NEGATIVE BASE EXCESS, VEN: ABNORMAL (ref 0–2)
NITRITE, URINE: NEGATIVE
NRBC AUTOMATED: 0 PER 100 WBC
O2 DEVICE/FLOW/%: ABNORMAL
O2 SAT, VEN: 95 % (ref 60–85)
PATIENT TEMP: ABNORMAL
PCO2, VEN: 39.7 MM HG (ref 41–51)
PDW BLD-RTO: 15.5 % (ref 11.8–14.4)
PH UA: 6 (ref 5–8)
PH VENOUS: 7.4 (ref 7.32–7.43)
PLATELET # BLD: 330 K/UL (ref 138–453)
PLATELET ESTIMATE: ABNORMAL
PMV BLD AUTO: 11.7 FL (ref 8.1–13.5)
PO2, VEN: 77.2 MM HG (ref 30–50)
POC BUN: 34 MG/DL (ref 8–26)
POC CHLORIDE: 99 MMOL/L (ref 98–107)
POC CREATININE: 1.19 MG/DL (ref 0.51–1.19)
POC HEMATOCRIT: 36 % (ref 36–46)
POC HEMOGLOBIN: 12.3 G/DL (ref 12–16)
POC IONIZED CALCIUM: 1.17 MMOL/L (ref 1.15–1.33)
POC LACTIC ACID: 1.14 MMOL/L (ref 0.56–1.39)
POC PCO2 TEMP: ABNORMAL MM HG
POC PH TEMP: ABNORMAL
POC PO2 TEMP: ABNORMAL MM HG
POC POTASSIUM: 4.9 MMOL/L (ref 3.5–4.5)
POC SODIUM: 133 MMOL/L (ref 138–146)
POC TCO2: 25 MMOL/L (ref 22–30)
POSITIVE BASE EXCESS, VEN: 0 (ref 0–3)
POTASSIUM SERPL-SCNC: 5 MMOL/L (ref 3.7–5.3)
PROTEIN UA: NEGATIVE
RBC # BLD: 3.33 M/UL (ref 3.95–5.11)
RBC # BLD: ABNORMAL 10*6/UL
SAMPLE SITE: ABNORMAL
SARS-COV-2, RAPID: NOT DETECTED
SEG NEUTROPHILS: 63 % (ref 36–65)
SEGMENTED NEUTROPHILS ABSOLUTE COUNT: 4.79 K/UL (ref 1.5–8.1)
SERUM OSMOLALITY: 328 MOSM/KG (ref 275–295)
SODIUM BLD-SCNC: 136 MMOL/L (ref 135–144)
SPECIFIC GRAVITY UA: 1.03 (ref 1–1.03)
SPECIMEN DESCRIPTION: NORMAL
TOTAL CK: 195 U/L (ref 26–192)
TOTAL CO2, VENOUS: ABNORMAL MMOL/L (ref 23–30)
TOTAL PROTEIN: 7.6 G/DL (ref 6.4–8.3)
TURBIDITY: CLEAR
URINE HGB: NEGATIVE
UROBILINOGEN, URINE: NORMAL
WBC # BLD: 7.6 K/UL (ref 3.5–11.3)
WBC # BLD: ABNORMAL 10*3/UL

## 2021-06-07 PROCEDURE — 82010 KETONE BODYS QUAN: CPT

## 2021-06-07 PROCEDURE — 82330 ASSAY OF CALCIUM: CPT

## 2021-06-07 PROCEDURE — 2580000003 HC RX 258: Performed by: STUDENT IN AN ORGANIZED HEALTH CARE EDUCATION/TRAINING PROGRAM

## 2021-06-07 PROCEDURE — 82803 BLOOD GASES ANY COMBINATION: CPT

## 2021-06-07 PROCEDURE — 85025 COMPLETE CBC W/AUTO DIFF WBC: CPT

## 2021-06-07 PROCEDURE — 83930 ASSAY OF BLOOD OSMOLALITY: CPT

## 2021-06-07 PROCEDURE — 84520 ASSAY OF UREA NITROGEN: CPT

## 2021-06-07 PROCEDURE — 96372 THER/PROPH/DIAG INJ SC/IM: CPT

## 2021-06-07 PROCEDURE — 83690 ASSAY OF LIPASE: CPT

## 2021-06-07 PROCEDURE — 93005 ELECTROCARDIOGRAM TRACING: CPT | Performed by: STUDENT IN AN ORGANIZED HEALTH CARE EDUCATION/TRAINING PROGRAM

## 2021-06-07 PROCEDURE — 83605 ASSAY OF LACTIC ACID: CPT

## 2021-06-07 PROCEDURE — 87635 SARS-COV-2 COVID-19 AMP PRB: CPT

## 2021-06-07 PROCEDURE — 82947 ASSAY GLUCOSE BLOOD QUANT: CPT

## 2021-06-07 PROCEDURE — 6370000000 HC RX 637 (ALT 250 FOR IP): Performed by: STUDENT IN AN ORGANIZED HEALTH CARE EDUCATION/TRAINING PROGRAM

## 2021-06-07 PROCEDURE — 96361 HYDRATE IV INFUSION ADD-ON: CPT

## 2021-06-07 PROCEDURE — G0378 HOSPITAL OBSERVATION PER HR: HCPCS

## 2021-06-07 PROCEDURE — 80053 COMPREHEN METABOLIC PANEL: CPT

## 2021-06-07 PROCEDURE — 81003 URINALYSIS AUTO W/O SCOPE: CPT

## 2021-06-07 PROCEDURE — 82565 ASSAY OF CREATININE: CPT

## 2021-06-07 PROCEDURE — 82550 ASSAY OF CK (CPK): CPT

## 2021-06-07 PROCEDURE — 87086 URINE CULTURE/COLONY COUNT: CPT

## 2021-06-07 PROCEDURE — 96374 THER/PROPH/DIAG INJ IV PUSH: CPT

## 2021-06-07 PROCEDURE — 83874 ASSAY OF MYOGLOBIN: CPT

## 2021-06-07 PROCEDURE — 85014 HEMATOCRIT: CPT

## 2021-06-07 PROCEDURE — 99285 EMERGENCY DEPT VISIT HI MDM: CPT

## 2021-06-07 PROCEDURE — 86140 C-REACTIVE PROTEIN: CPT

## 2021-06-07 PROCEDURE — 6360000002 HC RX W HCPCS: Performed by: STUDENT IN AN ORGANIZED HEALTH CARE EDUCATION/TRAINING PROGRAM

## 2021-06-07 PROCEDURE — 80051 ELECTROLYTE PANEL: CPT

## 2021-06-07 RX ORDER — HYDROCODONE BITARTRATE AND ACETAMINOPHEN 5; 325 MG/1; MG/1
2 TABLET ORAL EVERY 4 HOURS PRN
Status: DISCONTINUED | OUTPATIENT
Start: 2021-06-07 | End: 2021-06-13

## 2021-06-07 RX ORDER — ONDANSETRON 2 MG/ML
4 INJECTION INTRAMUSCULAR; INTRAVENOUS EVERY 6 HOURS PRN
Status: DISCONTINUED | OUTPATIENT
Start: 2021-06-07 | End: 2021-06-13

## 2021-06-07 RX ORDER — SODIUM CHLORIDE 0.9 % (FLUSH) 0.9 %
5-40 SYRINGE (ML) INJECTION EVERY 12 HOURS SCHEDULED
Status: DISCONTINUED | OUTPATIENT
Start: 2021-06-07 | End: 2021-06-14 | Stop reason: HOSPADM

## 2021-06-07 RX ORDER — DEXTROSE MONOHYDRATE 50 MG/ML
100 INJECTION, SOLUTION INTRAVENOUS PRN
Status: DISCONTINUED | OUTPATIENT
Start: 2021-06-07 | End: 2021-06-14 | Stop reason: HOSPADM

## 2021-06-07 RX ORDER — PANTOPRAZOLE SODIUM 40 MG/1
40 TABLET, DELAYED RELEASE ORAL
Status: DISCONTINUED | OUTPATIENT
Start: 2021-06-08 | End: 2021-06-13

## 2021-06-07 RX ORDER — ONDANSETRON 4 MG/1
4 TABLET, ORALLY DISINTEGRATING ORAL EVERY 8 HOURS PRN
Status: DISCONTINUED | OUTPATIENT
Start: 2021-06-07 | End: 2021-06-13

## 2021-06-07 RX ORDER — ACETAMINOPHEN 500 MG
1000 TABLET ORAL ONCE
Status: COMPLETED | OUTPATIENT
Start: 2021-06-07 | End: 2021-06-07

## 2021-06-07 RX ORDER — 0.9 % SODIUM CHLORIDE 0.9 %
1000 INTRAVENOUS SOLUTION INTRAVENOUS ONCE
Status: COMPLETED | OUTPATIENT
Start: 2021-06-07 | End: 2021-06-07

## 2021-06-07 RX ORDER — KETOROLAC TROMETHAMINE 15 MG/ML
15 INJECTION, SOLUTION INTRAMUSCULAR; INTRAVENOUS ONCE
Status: COMPLETED | OUTPATIENT
Start: 2021-06-07 | End: 2021-06-07

## 2021-06-07 RX ORDER — SODIUM CHLORIDE 9 MG/ML
INJECTION, SOLUTION INTRAVENOUS CONTINUOUS
Status: DISCONTINUED | OUTPATIENT
Start: 2021-06-07 | End: 2021-06-13

## 2021-06-07 RX ORDER — NICOTINE POLACRILEX 4 MG
15 LOZENGE BUCCAL PRN
Status: DISCONTINUED | OUTPATIENT
Start: 2021-06-07 | End: 2021-06-14 | Stop reason: HOSPADM

## 2021-06-07 RX ORDER — DEXTROSE MONOHYDRATE 25 G/50ML
12.5 INJECTION, SOLUTION INTRAVENOUS PRN
Status: DISCONTINUED | OUTPATIENT
Start: 2021-06-07 | End: 2021-06-14 | Stop reason: HOSPADM

## 2021-06-07 RX ORDER — ACETAMINOPHEN 325 MG/1
650 TABLET ORAL EVERY 4 HOURS PRN
Status: DISCONTINUED | OUTPATIENT
Start: 2021-06-07 | End: 2021-06-14 | Stop reason: HOSPADM

## 2021-06-07 RX ORDER — SODIUM CHLORIDE 0.9 % (FLUSH) 0.9 %
5-40 SYRINGE (ML) INJECTION PRN
Status: DISCONTINUED | OUTPATIENT
Start: 2021-06-07 | End: 2021-06-14 | Stop reason: HOSPADM

## 2021-06-07 RX ORDER — SODIUM CHLORIDE 9 MG/ML
25 INJECTION, SOLUTION INTRAVENOUS PRN
Status: DISCONTINUED | OUTPATIENT
Start: 2021-06-07 | End: 2021-06-14 | Stop reason: HOSPADM

## 2021-06-07 RX ORDER — HYDROCODONE BITARTRATE AND ACETAMINOPHEN 5; 325 MG/1; MG/1
1 TABLET ORAL EVERY 4 HOURS PRN
Status: DISCONTINUED | OUTPATIENT
Start: 2021-06-07 | End: 2021-06-13

## 2021-06-07 RX ADMIN — HYDROCODONE BITARTRATE AND ACETAMINOPHEN 2 TABLET: 5; 325 TABLET ORAL at 23:43

## 2021-06-07 RX ADMIN — INSULIN LISPRO 2 UNITS: 100 INJECTION, SOLUTION INTRAVENOUS; SUBCUTANEOUS at 23:18

## 2021-06-07 RX ADMIN — SODIUM CHLORIDE: 9 INJECTION, SOLUTION INTRAVENOUS at 23:18

## 2021-06-07 RX ADMIN — INSULIN LISPRO 10 UNITS: 100 INJECTION, SOLUTION INTRAVENOUS; SUBCUTANEOUS at 15:27

## 2021-06-07 RX ADMIN — KETOROLAC TROMETHAMINE 15 MG: 15 INJECTION, SOLUTION INTRAMUSCULAR; INTRAVENOUS at 17:37

## 2021-06-07 RX ADMIN — ACETAMINOPHEN 1000 MG: 500 TABLET ORAL at 14:59

## 2021-06-07 RX ADMIN — SODIUM CHLORIDE 1000 ML: 9 INJECTION, SOLUTION INTRAVENOUS at 15:21

## 2021-06-07 RX ADMIN — INSULIN LISPRO 20 UNITS: 100 INJECTION, SOLUTION INTRAVENOUS; SUBCUTANEOUS at 17:09

## 2021-06-07 ASSESSMENT — ENCOUNTER SYMPTOMS
ABDOMINAL PAIN: 1
BACK PAIN: 1
BACK PAIN: 0
NAUSEA: 0
ABDOMINAL PAIN: 0
VOMITING: 0
SHORTNESS OF BREATH: 0

## 2021-06-07 ASSESSMENT — PAIN SCALES - GENERAL
PAINLEVEL_OUTOF10: 10

## 2021-06-07 ASSESSMENT — PAIN DESCRIPTION - PAIN TYPE: TYPE: CHRONIC PAIN

## 2021-06-07 NOTE — ED NOTES
The following labs labeled with pt sticker and tubed:     [x] Lavender   [] on ice   [x] Blue   [x] Green/yellow  [x] Green/black [] on ice  [] Pink  [] Red  [x] Yellow     [] COVID-19 swab    [] Rapid     [x] Urine Sample  [] Pelvic Cultures    [] Blood Cultures              Yuni Alvares RN  06/07/21 [address] [facility]  06/07/21 [id number]

## 2021-06-07 NOTE — ED NOTES
Pt arrived from EMS with complaints of falling at home. Pt stated that she was down for 2 days and that she was unable to get up. As a result of being down the pt was unable to take any of her medications and she soiled herself. EMS stated that the sugar reading that they got was High. Pt stated that she lost balance on her fall and that she did hit her head but didn't lose consciousness. Pt stated that she does have SOB. RR equal and unlabored  Pt placed on full cardiac monitor. EKG performed. Resident at the bedside.  Will continue plan of care     Russell Mayfield RN  06/07/21 [address] RN  06/07/21 [id number]

## 2021-06-07 NOTE — ED NOTES
Bed: 23  Expected date:   Expected time:   Means of arrival:   Comments:  [address] Kar[address] Jc RN  06/07/21 0768

## 2021-06-07 NOTE — ED PROVIDER NOTES
[facility]     Emergency Department     Faculty Note/ Attestation      Pt Name: Sushant Valencia                                       MRN: [id number]  [place] 1965  Date of evaluation: 6/7/2021  Patients PCP:    Edwin Ramirez MD    Attestation  I performed a history and physical examination of the patient/ or directly observed  and discussed management with the resident. I reviewed the residents note and agree with the documented findings and plan of care. Any areas of disagreement are noted on the chart. I was personally present for the key portions of any procedures. I have documented in the chart those procedures where I was not present during the key portions. I have reviewed the emergency nurses triage note. I agree with the chief complaint, past medical history, past surgical history, allergies, medications, social and family history as documented unless otherwise noted below. For Physician Assistant/ Nurse Practitioner cases/documentation I have personally evaluated this patient and have completed at least one if not all key elements of the E/M (history, physical exam, and MDM). Additional findings are as noted. This patient was evaluated in the Emergency Department for symptoms described in the history of present illness. The patient was evaluated in the context of the global COVID-19 pandemic, which necessitated consideration that the patient might be at risk for infection with the SARS-CoV-2 virus that causes COVID-19. Institutional protocols and algorithms that pertain to the evaluation of patients at risk for COVID-19 are in a state of rapid change based on information released by regulatory bodies including the CDC and federal and state organizations. These policies and algorithms were followed during the patient's care in the ED.      Initial Screens:        Sargent Coma Scale  Eye Opening: Spontaneous  Best Verbal Response: Oriented  Best Motor Response: Obeys commands  Major Coma Scale Score: 15    Vitals:    Vitals:    06/07/21 1434 06/07/21 1439 06/07/21 1508 06/07/21 1511   BP: (!) 163/108 (!) 163/108     Pulse: 88 104 93    Resp: 18 21  17   Temp: 97.9 °F (36.6 °C)      SpO2: 98%  99%    Weight: 250 lb (113.4 kg)          Chief Complaint      Chief Complaint   Patient presents with    Hyperglycemia          weight is 250 lb (113.4 kg). Her temperature is 97.9 °F (36.6 °C). Her blood pressure is 163/108 (abnormal) and her pulse is 93. Her respiration is 17 and oxygen saturation is 99%. DIAGNOSTIC RESULTS       RADIOLOGY:   No orders to display         LABS:  Labs Reviewed   CBC WITH AUTO DIFFERENTIAL - Abnormal; Notable for the following components:       Result Value    RBC 3.33 (*)     Hemoglobin 10.2 (*)     Hematocrit 33.2 (*)     RDW 15.5 (*)     Lymphocytes 23 (*)     All other components within normal limits   COMPREHENSIVE METABOLIC PANEL W/ REFLEX TO MG FOR LOW K - Abnormal; Notable for the following components:    Glucose 743 (*)     BUN 33 (*)     CREATININE 1.06 (*)     Chloride 95 (*)     Anion Gap 18 (*)     Alkaline Phosphatase 109 (*)     Albumin/Globulin Ratio 0.9 (*)     GFR Non- 54 (*)     All other components within normal limits   LIPASE - Abnormal; Notable for the following components:    Lipase 193 (*)     All other components within normal limits   URINALYSIS - Abnormal; Notable for the following components:    Glucose, Ur 3+ (*)     Ketones, Urine SMALL (*)     Specific Gravity, UA 1.031 (*)     All other components within normal limits   C-REACTIVE PROTEIN - Abnormal; Notable for the following components:    CRP 30.4 (*)     All other components within normal limits   CK - Abnormal; Notable for the following components:     Total  (*)     All other components within normal limits   MYOGLOBIN, SERUM - Abnormal; Notable for the following components:    Myoglobin 114 (*)     All other components within depressed, severe ([address]    BMI 34.0-34.9,adult    Cannabis use disorder, severe, dependence (HCC)    Cocaine use disorder, severe, dependence (HCC)    Dizziness    Dyslipidemia    Family history of colon cancer    History of lupus    Homicidal ideation    Hypotension    Neuropathy    Normocytic anemia    Recurrent depression (HCC)    Recurrent major depressive disorder, in partial remission (HCC)    Severe recurrent major depression with psychotic features ([address]    Severe recurrent major depression without psychotic features ([address]    Upper GI bleed    Vitamin D deficiency    White matter changes    Gastroesophageal reflux disease    Stroke (cerebrum) (HCC)    Rib lesion    Type 2 diabetes mellitus (HCC)    YAEL (generalized anxiety disorder)    Posttraumatic stress disorder    Suicidal intent    Leg weakness, bilateral    Poorly controlled diabetes mellitus ([address]    Acute kidney injury ([address]    Felon of finger    Osteomyelitis ([address]         Comments  Chronic Prob List noted  dEHYDRATION  hYPERGLYCEMIA > 600  Note . Maryln Solar Bun < 60  (33 range)  PH normal    rwk        Doyle Bentley MD,, MD, F.A.C.E.P.   Attending Emergency Physician         Doyle Bentley MD  06/07/21 8268

## 2021-06-07 NOTE — ED PROVIDER NOTES
[facility]  ED  Emergency Department Encounter  Emergency Medicine Resident     Pt Name: Jo Ann Kohler  MRN: [id number]  [place] 1965  Date of evaluation: 6/7/21  PCP:  Jeremi Liang MD    [address] [place] [address]       Chief Complaint   Patient presents with    Hyperglycemia       HISTORY OFPRESENT ILLNESS  (Location/Symptom, Timing/Onset, Context/Setting, Quality, Duration, Modifying Factors,Severity.)      Jo Ann Kohler is a 49 y. o.yo female who presents with hyperglcemia, found down, abdominal pain. Patient here after falling 2 days ago, was down for 2 days, states that she remembers the fall, she lost her balance, was down for 2 days because nobody helped her up. States that she lives in a [facility] center and has recently lost her . Has a history of diabetes and lupus, has not taken her insulin for 2 days. States she is having abdominal pain but denies chest pain or shortness of breath, denies nausea or vomiting, states she is having a headache. Recent fevers or chills, states that she feels weak and unable to move.      PAST MEDICAL / SURGICAL / SOCIAL / FAMILY HISTORY      has a past medical history of Acid reflux, Acute cystitis with hematuria, Acute non-recurrent maxillary sinusitis, Asthma, Bipolar 1 disorder ([address] Bipolar disorder, mixed ([address] BMI 34.0-34.9,adult, Cannabis use disorder, severe, dependence ([address] Cerebrovascular accident (CVA) ([address] Chest pain, Chronic renal insufficiency, Cocaine abuse ([address] DDD (degenerative disc disease), cervical, Diabetes mellitus ([address] Dizziness, Fibromyalgia, History of stroke, Homicidal ideation, Hyperglycemia, Hypertension, Hypotension, IDDM (insulin dependent diabetes mellitus), Lupus ([address] Migraine, Neuropathy, Neuropathy, Polysubstance abuse ([address] Post traumatic stress disorder (PTSD), Posttraumatic stress disorder, Recurrent depression ([address] Recurrent major depressive disorder, in partial remission ([address] Screening mammogram, encounter for, Severe recurrent major depression with psychotic features ([facility] Severe recurrent major depression without psychotic features ([facility] Stroke (cerebrum) ([facility] Stroke ([facility] Suicidal ideation, Suicidal intent, Vitamin D deficiency, and White matter changes. has a past surgical history that includes Abscess Drainage; chest tube insertion; Abdomen surgery; Cataract removal with implant (Bilateral); LASIK (Bilateral); amputation (Left, 03/13/2021); and Finger amputation (Left, 3/13/2021). Social History     Socioeconomic History    Marital status: Legally      Spouse name: Not on file    Number of children: Not on file    Years of education: Not on file    Highest education level: Not on file   Occupational History    Not on file   Tobacco Use    Smoking status: Never Smoker    Smokeless tobacco: Never Used   Vaping Use    Vaping Use: Never used   Substance and Sexual Activity    Alcohol use: Not Currently    Drug use: Not Currently     Types: Marijuana, Cocaine    Sexual activity: Not Currently     Partners: Male   Other Topics Concern    Not on file   Social History Narrative    Not on file     Social Determinants of Health     Financial Resource Strain:     Difficulty of Paying Living Expenses:    Food Insecurity: No Food Insecurity    Worried About Running Out of Food in the Last Year: Never true    Kelin of Food in the Last Year: Never true   Transportation Needs: No Transportation Needs    Lack of Transportation (Medical): No    Lack of Transportation (Non-Medical):  No   Physical Activity:     Days of Exercise per Week:     Minutes of Exercise per Session:    Stress:     Feeling of Stress :    Social Connections:     Frequency of Communication with Friends and Family:     Frequency of Social Gatherings with Friends and Family:     Attends Mosque Services:     Active Member of Clubs or Organizations:     Attends Club or Organization Meetings:  Marital Status:    Intimate Partner Violence:     Fear of Current or Ex-Partner:     Emotionally Abused:     Physically Abused:     Sexually Abused:        Family History   Problem Relation Age of Onset    Diabetes Mother    [employer] Stroke Mother     Diabetes Father     Diabetes Sister     Diabetes Brother     Other Son         GSW    No Known Problems Sister         Allergies:  Bactrim [sulfamethoxazole-trimethoprim] and Adhesive tape    Home Medications:  Prior to Admission medications    Medication Sig Start Date End Date Taking? Authorizing Provider   Povidone-Iodine Scrub Sponge (BETADINE SWAB AID) 10 % SWAB Apply 1 Stick topically 2 times daily 3/15/21   Roberta Cherry DO   venlafaxine (EFFEXOR XR) 150 MG extended release capsule Take 1 capsule by mouth daily (with breakfast) 2/10/21   Janie Rubin MD   insulin glargine (LANTUS SOLOSTAR) 100 UNIT/ML injection pen Inject 15 Units into the skin nightly 2/9/21   Janie Rubin MD   Misc.  Devices MISC 1 pair of dm shoes, 3 accommodated inserts 1/22/21   Deja Carmen DPM   dicyclomine (BENTYL) 20 MG tablet Take 1 tablet by mouth 3 times daily as needed (for abdominal discomfort) 1/13/21   Christine Dotson MD   cetirizine (ZYRTEC) 10 MG tablet Take 1 tablet by mouth daily 1/13/21   Christine Dotson MD   Insulin Pen Needle ([employer] PEN NEEDLES 31G) 31G X 8 MM MISC 1 each by Does not apply route daily 1/4/21   Christine Dotson MD   gabapentin (NEURONTIN) 300 MG capsule TAKE 3 CAPSULES 900 MG BY MOUTH THREE TIMES DAILY 12/9/20 3/11/21  Christine Dotson MD    MG capsule TAKE 1 CAPSULE BY MOUTH TWICE DAILY 12/9/20   Christine Dotson MD   acetaminophen (TYLENOL) 325 MG tablet TAKE 2 TABLETS BY MOUTH EVERY 6 HOURS AS NEEDED FOR PAIN 12/9/20   Christine Dotson MD   metFORMIN (GLUCOPHAGE) 1000 MG tablet TAKE 1 TABLET BY MOUTH DAILY WITH BREAKFAST 12/9/20   Christine Dotson MD   traZODone (DESYREL) 150 MG tablet Take 1 tablet by mouth nightly 11/18/20   Ericka Crawford MD   omeprazole (PRILOSEC) 20 MG delayed release capsule Take 1 capsule by mouth Daily 11/18/20   MD TATUM Vieira LITE strip 1 each by Does not apply route 3 times daily 11/11/20   MD Tatum Vieira Lancets MISC 1 each by Does not apply route 3 times daily 11/11/20   Ericka Crawford MD   Alcohol Swabs (ALCOHOL PREP) 70 % PADS 1 each by Does not apply route as needed (use as needed) Use_____times per day  Diagnosis: 250.0   Diabetes ___Insulin-Dependent___Non-Insulin Dependent 11/11/20   Ericka Crawford MD   benzonatate (TESSALON) 200 MG capsule  8/17/20   Historical Provider, MD   celecoxib (CELEBREX) 200 MG capsule  10/10/20   Historical Provider, MD   albuterol sulfate  (90 Base) MCG/ACT inhaler Inhale 2 puffs into the lungs every 4 hours as needed for Wheezing 10/20/20 3/11/21  Ericka Crawford MD   FLOVENT  MCG/ACT inhaler Inhale 2 puffs into the lungs 2 times daily 10/20/20   Ericka Crawford MD   budesonide-formoterol (SYMBICORT) 80-4.5 MCG/ACT AERO Inhale 2 puffs into the lungs 2 times daily 10/20/20   Ericka Crawford MD   melatonin (RA MELATONIN) 3 MG TABS tablet Take 1 tablet by mouth daily 10/20/20   Ericka Crawford MD       REVIEW OFSYSTEMS    (2-9 systems for level 4, 10 or more for level 5)      Review of Systems   Constitutional: Negative for diaphoresis and fever. HENT: Negative for congestion. Eyes: Negative for visual disturbance. Respiratory: Negative for shortness of breath. Cardiovascular: Negative for chest pain. Gastrointestinal: Positive for abdominal pain. Negative for nausea and vomiting. Endocrine: Negative for polyuria. Genitourinary: Negative for dysuria. Musculoskeletal: Negative for back pain. Skin: Negative for wound. Neurological: Positive for headaches. Psychiatric/Behavioral: Negative for confusion.        PHYSICAL EXAM   (up to 7 for level 4, 8 or more forlevel 5)      ED TRIAGE VITALS BP: (!) 163/108, Temp: 97.9 °F (36.6 °C), Pulse: 88, Resp: 18, SpO2: 98 %    Vitals:    06/07/21 1538 06/07/21 1601 06/07/21 1735 06/07/21 1832   BP: 127/81 (!) 139/93     Pulse: 97 93 97 106   Resp: 23 21 19 22   Temp:       SpO2:    98%   Weight:           Physical Exam  Constitutional:       General: She is not in acute distress. Appearance: She is well-developed. HENT:      Head: Normocephalic and atraumatic. Nose: Nose normal.   Eyes:      Pupils: Pupils are equal, round, and reactive to light. Cardiovascular:      Rate and Rhythm: Normal rate and regular rhythm. Heart sounds: No murmur heard. Pulmonary:      Effort: Pulmonary effort is normal. No respiratory distress. Breath sounds: No stridor. No wheezing. Abdominal:      General: There is no distension. Palpations: Abdomen is soft. Tenderness: There is abdominal tenderness (generalized). Musculoskeletal:         General: No tenderness. Normal range of motion. Cervical back: Normal range of motion and neck supple. Skin:     General: Skin is warm and dry. Capillary Refill: Capillary refill takes less than 2 seconds. Findings: No erythema or rash. Neurological:      Mental Status: She is alert and oriented to person, place, and time. Sensory: No sensory deficit.       Deep Tendon Reflexes: Reflexes normal.   Psychiatric:         Behavior: Behavior normal.         DIFFERENTIAL  DIAGNOSIS     PLAN (LABS / IMAGING / EKG):  Orders Placed This Encounter   Procedures    Culture, Urine    COVID-19, Rapid    CBC Auto Differential    Comprehensive Metabolic Panel w/ Reflex to MG    Lipase    Urinalysis, reflex to microscopic    C-Reactive Protein    CK    MYOGLOBIN, SERUM    BETA-HYDROXYBUTYRATE    ELECTROLYTES PLUS    Hemoglobin and hematocrit, blood    CALCIUM, IONIC (POC)    Osmolality    HYPOGLYCEMIA TREATMENT: blood glucose less than 50 mg/dL and patient  ALERT and TOLERATING PO    HYPOGLYCEMIA TREATMENT: blood glucose less than 70 mg/dL and patient ALERT and TOLERATING PO    HYPOGLYCEMIA TREATMENT: blood glucose less than 70 mg/dL and patient NOT ALERT or NPO    Inpatient consult to IV Team    POC CHEMISTRY (NA,K,ICA,GLU,CALC HCT/HGB,LACTATE,CREA,CL)    Venous Blood Gas, POC    Creatinine W/GFR Point of Care    POCT urea (BUN)    Lactic Acid, POC    POCT Glucose    POCT Glucose    POC Glucose Fingerstick    EKG 12 Lead    Saline lock IV    Insert peripheral IV    PATIENT STATUS (FROM ED OR OR/PROCEDURAL) Observation       MEDICATIONS ORDERED:  Orders Placed This Encounter   Medications    0.9 % sodium chloride bolus    acetaminophen (TYLENOL) tablet 1,000 mg    insulin lispro (HUMALOG) injection vial 10 Units    glucose (GLUTOSE) 40 % oral gel 15 g    dextrose 50 % IV solution    glucagon (rDNA) injection 1 mg    dextrose 5 % solution    insulin lispro (HUMALOG) injection vial 20 Units    ketorolac (TORADOL) injection 15 mg       DDX:     DKA, rhabdo, HHS, hyperglycemia, ACS,    Initial MDM/Plan: 49 y.o. female who presents with found down, abdominal pain. DKA: Evaluation for DKA, negative for acidosis, gap, significant ketones however patient is hyperglycemic has not taken her insulin in 2 days.     Rhabdo: CK myoglobin not at a concerning level at this time, will get IV hydration    HHS: Although patient has significant hyperglycemia and is significantly dehydrated, BUN not significantly increased    Hyperglycemia: Initial glucose greater than 700, given 10 of insulin, repeat still greater than 600, given 20 units of insulin, IV fluids, IV line established by ultrasound    ACS: Troponins, EKG, no chest pain at this time      DIAGNOSTIC RESULTS / EMERGENCYDEPARTMENT COURSE / MDM     LABS:  Results for orders placed or performed during the hospital encounter of 06/07/21   CBC Auto Differential   Result Value Ref Range    WBC 7.6 3.5 - 11.3 k/uL Specific Gravity, UA 1.031 (H) 1.005 - 1.030    Urine Hgb NEGATIVE NEGATIVE    pH, UA 6.0 5.0 - 8.0    Protein, UA NEGATIVE NEGATIVE    Urobilinogen, Urine Normal Normal    Nitrite, Urine NEGATIVE NEGATIVE    Leukocyte Esterase, Urine NEGATIVE NEGATIVE    Urinalysis Comments       Microscopic exam not performed based on chemical results unless requested in original order.    C-Reactive Protein   Result Value Ref Range    CRP 30.4 (H) 0.0 - 5.0 mg/L   CK   Result Value Ref Range    Total  (H) 26 - 192 U/L   MYOGLOBIN, SERUM   Result Value Ref Range    Myoglobin 114 (H) 25 - 58 ng/mL   BETA-HYDROXYBUTYRATE   Result Value Ref Range    Beta-Hydroxybutyrate 2.49 (H) 0.02 - 0.27 mmol/L   ELECTROLYTES PLUS   Result Value Ref Range    POC Sodium 133 (L) 138 - 146 mmol/L    POC Potassium 4.9 (H) 3.5 - 4.5 mmol/L    POC Chloride 99 98 - 107 mmol/L    POC TCO2 25 22 - 30 mmol/L    Anion Gap 10 7 - 16 mmol/L   Hemoglobin and hematocrit, blood   Result Value Ref Range    POC Hemoglobin 12.3 12.0 - 16.0 g/dL    POC Hematocrit 36 36 - 46 %   CALCIUM, IONIC (POC)   Result Value Ref Range    POC Ionized Calcium 1.17 1.15 - 1.33 mmol/L   Venous Blood Gas, POC   Result Value Ref Range    pH, Alex 7.396 7.320 - 7.430    pCO2, Alex 39.7 (L) 41.0 - 51.0 mm Hg    pO2, Alex 77.2 (H) 30 - 50 mm Hg    HCO3, Venous 24.4 22.0 - 29.0 mmol/L    Total CO2, Venous NOT REPORTED 23.0 - 30.0 mmol/L    Negative Base Excess, Alex NOT REPORTED 0.0 - 2.0    Positive Base Excess, Alex 0 0.0 - 3.0    O2 Sat, Alex 95 (H) 60.0 - 85.0 %    O2 Device/Flow/% NOT REPORTED     Danish Test NOT REPORTED     Sample Site NOT REPORTED     Mode NOT REPORTED     FIO2 NOT REPORTED     Pt Temp NOT REPORTED     POC pH Temp NOT REPORTED     POC pCO2 Temp NOT REPORTED mm Hg    POC pO2 Temp NOT REPORTED mm Hg   Creatinine W/GFR Point of Care   Result Value Ref Range    POC Creatinine 1.19 0.51 - 1.19 mg/dL    GFR Comment 57 (L) >60 mL/min    GFR Non- 47 (L) >60 mL/min    GFR Comment         POCT urea (BUN)   Result Value Ref Range    POC BUN 34 (H) 8 - 26 mg/dL   Lactic Acid, POC   Result Value Ref Range    POC Lactic Acid 1.14 0.56 - 1.39 mmol/L   POCT Glucose   Result Value Ref Range    POC Glucose >700 (HH) 74 - 100 mg/dL   POC Glucose Fingerstick   Result Value Ref Range    POC Glucose 490 (HH) 65 - 105 mg/dL   EKG 12 Lead   Result Value Ref Range    Ventricular Rate 95 BPM    Atrial Rate 95 BPM    P-R Interval 126 ms    QRS Duration 86 ms    Q-T Interval 374 ms    QTc Calculation (Bazett) 469 ms    P Axis 71 degrees    R Axis 4 degrees    T Axis 47 degrees       RADIOLOGY:  No orders to display       EKG  No ST segment elevations or depressions seen on EKG    All EKG's are interpreted by the Emergency Department Physicianwho either signs or Co-signs this chart in the absence of a cardiologist.    EMERGENCY DEPARTMENT COURSE:  ED Course as of Jun 07 1859   Mon Jun 07, 2021   1449 Seen and assessed in the emergency department no acute respiratory cardiovascular distress. Patient here after falling 2 days ago, was down for 2 days, states that she remembers the fall, she lost her balance, was down for 2 days because nobody helped her up. States that she lives in a [facility] center and has recently lost her . Has a history of diabetes and lupus, has not taken her insulin for 2 days. States she is having abdominal pain but denies chest pain or shortness of breath, denies nausea or vomiting, states she is having a headache. Recent fevers or chills, states that she feels weak and unable to move.     [PS]   5623 POC Glucose(!!): >700 [PS]   1513 Anion Gap: 10 [PS]   1513 pH, Alex: 7.396 [PS]   7515 Tolerating po        [PS]   8007 IV placed    [PS]   2487 POC Glucose(!!): 490 [PS]      ED Course User Index  [PS] Catia Rivero MD         PROCEDURES:  None    CONSULTS:  IP CONSULT TO IV TEAM    CRITICAL CARE:  Please see attending note    FINAL IMPRESSION      1.

## 2021-06-08 LAB
ANION GAP SERPL CALCULATED.3IONS-SCNC: 17 MMOL/L (ref 9–17)
ANION GAP SERPL CALCULATED.3IONS-SCNC: 19 MMOL/L (ref 9–17)
BETA-HYDROXYBUTYRATE: 1.36 MMOL/L (ref 0.02–0.27)
BETA-HYDROXYBUTYRATE: 2.41 MMOL/L (ref 0.02–0.27)
BUN BLDV-MCNC: 28 MG/DL (ref 6–20)
BUN BLDV-MCNC: 29 MG/DL (ref 6–20)
BUN/CREAT BLD: ABNORMAL (ref 9–20)
BUN/CREAT BLD: ABNORMAL (ref 9–20)
CALCIUM SERPL-MCNC: 8.6 MG/DL (ref 8.6–10.4)
CALCIUM SERPL-MCNC: 8.8 MG/DL (ref 8.6–10.4)
CHLORIDE BLD-SCNC: 101 MMOL/L (ref 98–107)
CHLORIDE BLD-SCNC: 96 MMOL/L (ref 98–107)
CO2: 20 MMOL/L (ref 20–31)
CO2: 22 MMOL/L (ref 20–31)
CREAT SERPL-MCNC: 0.94 MG/DL (ref 0.5–0.9)
CREAT SERPL-MCNC: 1.01 MG/DL (ref 0.5–0.9)
CULTURE: NORMAL
GFR AFRICAN AMERICAN: >60 ML/MIN
GFR AFRICAN AMERICAN: >60 ML/MIN
GFR NON-AFRICAN AMERICAN: 57 ML/MIN
GFR NON-AFRICAN AMERICAN: >60 ML/MIN
GFR SERPL CREATININE-BSD FRML MDRD: ABNORMAL ML/MIN/{1.73_M2}
GLUCOSE BLD-MCNC: 192 MG/DL (ref 65–105)
GLUCOSE BLD-MCNC: 255 MG/DL (ref 70–99)
GLUCOSE BLD-MCNC: 264 MG/DL (ref 65–105)
GLUCOSE BLD-MCNC: 265 MG/DL (ref 70–99)
GLUCOSE BLD-MCNC: 268 MG/DL (ref 65–105)
GLUCOSE BLD-MCNC: 275 MG/DL (ref 65–105)
GLUCOSE BLD-MCNC: 279 MG/DL (ref 65–105)
GLUCOSE BLD-MCNC: 301 MG/DL (ref 65–105)
GLUCOSE BLD-MCNC: >600 MG/DL (ref 65–105)
LACTIC ACID, WHOLE BLOOD: 1.3 MMOL/L (ref 0.7–2.1)
LACTIC ACID: NORMAL MMOL/L
Lab: NORMAL
POTASSIUM SERPL-SCNC: 3.9 MMOL/L (ref 3.7–5.3)
POTASSIUM SERPL-SCNC: 4.2 MMOL/L (ref 3.7–5.3)
SODIUM BLD-SCNC: 135 MMOL/L (ref 135–144)
SODIUM BLD-SCNC: 140 MMOL/L (ref 135–144)
SPECIMEN DESCRIPTION: NORMAL

## 2021-06-08 PROCEDURE — 6370000000 HC RX 637 (ALT 250 FOR IP): Performed by: EMERGENCY MEDICINE

## 2021-06-08 PROCEDURE — 80048 BASIC METABOLIC PNL TOTAL CA: CPT

## 2021-06-08 PROCEDURE — 6370000000 HC RX 637 (ALT 250 FOR IP): Performed by: STUDENT IN AN ORGANIZED HEALTH CARE EDUCATION/TRAINING PROGRAM

## 2021-06-08 PROCEDURE — 97162 PT EVAL MOD COMPLEX 30 MIN: CPT

## 2021-06-08 PROCEDURE — 83605 ASSAY OF LACTIC ACID: CPT

## 2021-06-08 PROCEDURE — G0378 HOSPITAL OBSERVATION PER HR: HCPCS

## 2021-06-08 PROCEDURE — 6360000002 HC RX W HCPCS: Performed by: STUDENT IN AN ORGANIZED HEALTH CARE EDUCATION/TRAINING PROGRAM

## 2021-06-08 PROCEDURE — 97530 THERAPEUTIC ACTIVITIES: CPT

## 2021-06-08 PROCEDURE — 82010 KETONE BODYS QUAN: CPT

## 2021-06-08 PROCEDURE — 97535 SELF CARE MNGMENT TRAINING: CPT

## 2021-06-08 PROCEDURE — 36415 COLL VENOUS BLD VENIPUNCTURE: CPT

## 2021-06-08 PROCEDURE — 97166 OT EVAL MOD COMPLEX 45 MIN: CPT

## 2021-06-08 RX ORDER — GABAPENTIN 300 MG/1
900 CAPSULE ORAL 3 TIMES DAILY
Status: DISCONTINUED | OUTPATIENT
Start: 2021-06-08 | End: 2021-06-14 | Stop reason: HOSPADM

## 2021-06-08 RX ADMIN — PANTOPRAZOLE SODIUM 40 MG: 40 TABLET, DELAYED RELEASE ORAL at 18:01

## 2021-06-08 RX ADMIN — INSULIN LISPRO 3 UNITS: 100 INJECTION, SOLUTION INTRAVENOUS; SUBCUTANEOUS at 18:01

## 2021-06-08 RX ADMIN — HYDROCODONE BITARTRATE AND ACETAMINOPHEN 2 TABLET: 5; 325 TABLET ORAL at 09:32

## 2021-06-08 RX ADMIN — INSULIN LISPRO 9 UNITS: 100 INJECTION, SOLUTION INTRAVENOUS; SUBCUTANEOUS at 09:32

## 2021-06-08 RX ADMIN — GABAPENTIN 900 MG: 300 CAPSULE ORAL at 21:41

## 2021-06-08 RX ADMIN — INSULIN LISPRO 5 UNITS: 100 INJECTION, SOLUTION INTRAVENOUS; SUBCUTANEOUS at 21:40

## 2021-06-08 RX ADMIN — PANTOPRAZOLE SODIUM 40 MG: 40 TABLET, DELAYED RELEASE ORAL at 09:09

## 2021-06-08 RX ADMIN — HYDROCODONE BITARTRATE AND ACETAMINOPHEN 2 TABLET: 5; 325 TABLET ORAL at 18:00

## 2021-06-08 RX ADMIN — INSULIN LISPRO 12 UNITS: 100 INJECTION, SOLUTION INTRAVENOUS; SUBCUTANEOUS at 13:04

## 2021-06-08 RX ADMIN — INSULIN LISPRO 9 UNITS: 100 INJECTION, SOLUTION INTRAVENOUS; SUBCUTANEOUS at 14:25

## 2021-06-08 RX ADMIN — TRAZODONE HYDROCHLORIDE 150 MG: 50 TABLET ORAL at 21:41

## 2021-06-08 RX ADMIN — HYDROCODONE BITARTRATE AND ACETAMINOPHEN 2 TABLET: 5; 325 TABLET ORAL at 03:45

## 2021-06-08 RX ADMIN — GABAPENTIN 900 MG: 300 CAPSULE ORAL at 13:03

## 2021-06-08 ASSESSMENT — PAIN SCALES - GENERAL
PAINLEVEL_OUTOF10: 10
PAINLEVEL_OUTOF10: 0

## 2021-06-08 ASSESSMENT — PAIN DESCRIPTION - PROGRESSION: CLINICAL_PROGRESSION: NOT CHANGED

## 2021-06-08 ASSESSMENT — PAIN DESCRIPTION - PAIN TYPE
TYPE: CHRONIC PAIN

## 2021-06-08 ASSESSMENT — PAIN DESCRIPTION - DESCRIPTORS
DESCRIPTORS: ACHING;CONSTANT
DESCRIPTORS: DISCOMFORT
DESCRIPTORS: ACHING;CONSTANT

## 2021-06-08 ASSESSMENT — PAIN DESCRIPTION - LOCATION
LOCATION: GENERALIZED

## 2021-06-08 ASSESSMENT — PAIN DESCRIPTION - FREQUENCY
FREQUENCY: CONTINUOUS
FREQUENCY: CONTINUOUS

## 2021-06-08 NOTE — CARE COORDINATION
Received social work consult due to living situation. Met with A&O 49 y. o pt. Pt states that she just moved into the [facility] apartments on  after snf stay at [facility]. Pt became very tearful stating that her   while she was in the nursing home in March, her daughter  at [place] of a stroke and that her son was murdered in . Pt was linked with grief counseling at [facility] in the past and plans to resume going once she is feeling better. Pt states that she does not have anyone that is able to help her. She was found down in her apartment by maintenance. She states that due to her Lupus she was unable to care for herself and was unable to get out of bed and that there is feces covering the bed. Pt agreeable to writer contacting her apartment to see if they have any services able to help assist in cleaning apartment. Spoke with Denzel Benton,  [phone] ext [phone] Denzel Benton states that they do not have anyone to assist in cleaning up apartment or assist with patients needs. She states pt only has a deflated air mattress in room and no other furniture or food. Denzel Benton states that she can not keep pt from returning however, she feels she needs to go to a nursing home because she is unable to care for herself at this time. Spoke with Cris Bowers with [facility]. Cris Bowers willing to look into and cover cost of service that would be able to go into the apartment to clean and then assist with getting pt some furniture. This however will take some time and pt will need to be temporarily placed. Provided Cris Bowers with contact information for pt and [facility] manager to assist with cleaning up apartment. Met with pt to discuss need for short term SNF. Pt asked writer to call her daughter Corey Pritchett to see if she would be willing for pt to come live with her temporarily until home is cleaned.   Call placed to daughter Corey Pritchett who told writer that pt is aware that coming to live with her is not an option. Discussed SNF and pt is agreeable. Call then placed to Petrona Schreiber with case management to notify of above and that pt is willing to return to snf. Will follow.

## 2021-06-08 NOTE — H&P
[address]  CDU / OBSERVATION ENCOUNTER  RESIDENT NOTE     Pt Name: Fady Cole  MRN: [id number]  [place] 1965  Date of evaluation: 6/7/21  Patient's PCP is :  Luis Miguel Lugo MD    CHIEF COMPLAINT       Chief Complaint   Patient presents with    Hyperglycemia         HISTORY OF PRESENT ILLNESS    Fady Cole is a 49 y.o. female who presents abdominal pain, hip pain, back pain and hyperglycemia after a fall at home. Patient states she lost her balance and was down for 2 days and she lives alone. Patient currently denies fever, chills, nausea and vomiting. Patient states she was unable to take her diabetes medication for those 2 days    Location/Symptom: s/p fall  Timing/Onset: 2 days  Provocation: unknown  Quality: n/a  Radiation: n/a  Severity: n/a  Timing/Duration: n/a  Modifying Factors: n/a    REVIEW OF SYSTEMS       Review of Systems   Constitutional: Positive for fatigue. Negative for chills and fever. Eyes: Negative for visual disturbance. Respiratory: Negative for shortness of breath. Cardiovascular: Negative for chest pain. Gastrointestinal: Negative for abdominal pain, nausea and vomiting. Endocrine: Negative for polyuria. Genitourinary: Negative for difficulty urinating. Musculoskeletal: Positive for arthralgias, back pain and myalgias. Neurological: Negative for dizziness, syncope and numbness. Psychiatric/Behavioral: Negative for agitation and behavioral problems. (PQRS) Advance directives on face sheet per hospital policy.  No change unless specifically mentioned in chart    PAST MEDICAL HISTORY    has a past medical history of Acid reflux, Acute cystitis with hematuria, Acute non-recurrent maxillary sinusitis, Asthma, Bipolar 1 disorder ([address] Bipolar disorder, mixed ([address] BMI 34.0-34.9,adult, Cannabis use disorder, severe, dependence ([address] Cerebrovascular accident (CVA) ([address] Chest pain, Chronic renal insufficiency, Cocaine abuse ([address] DDD (degenerative disc disease), cervical, Diabetes mellitus ([address] Dizziness, Fibromyalgia, History of stroke, Homicidal ideation, Hyperglycemia, Hypertension, Hypotension, IDDM (insulin dependent diabetes mellitus), Lupus ([address] Migraine, Neuropathy, Neuropathy, Polysubstance abuse ([address] Post traumatic stress disorder (PTSD), Posttraumatic stress disorder, Recurrent depression ([address] Recurrent major depressive disorder, in partial remission ([address] Screening mammogram, encounter for, Severe recurrent major depression with psychotic features ([address] Severe recurrent major depression without psychotic features ([address] Stroke (cerebrum) ([address] Stroke ([address] Suicidal ideation, Suicidal intent, Vitamin D deficiency, and White matter changes. I have reviewed the past medical history with the patient and it is pertinent to this complaint. SURGICAL HISTORY      has a past surgical history that includes Abscess Drainage; chest tube insertion; Abdomen surgery; Cataract removal with implant (Bilateral); LASIK (Bilateral); amputation (Left, 03/13/2021); and Finger amputation (Left, 3/13/2021). I have reviewed and agree with Surgical History entered and it is pertinent to this complaint.      CURRENT MEDICATIONS     glucose (GLUTOSE) 40 % oral gel 15 g, PRN  dextrose 50 % IV solution, PRN  glucagon (rDNA) injection 1 mg, PRN  dextrose 5 % solution, PRN  traZODone (DESYREL) tablet 150 mg, Nightly  [START ON 6/8/2021] pantoprazole (PROTONIX) tablet 40 mg, BID AC  sodium chloride flush 0.9 % injection 5-40 mL, 2 times per day  sodium chloride flush 0.9 % injection 5-40 mL, PRN  0.9 % sodium chloride infusion, PRN  enoxaparin (LOVENOX) injection 40 mg, Daily  acetaminophen (TYLENOL) tablet 650 mg, Q4H PRN  ondansetron (ZOFRAN-ODT) disintegrating tablet 4 mg, Q8H PRN   Or  ondansetron (ZOFRAN) injection 4 mg, Q6H PRN  0.9 % sodium chloride infusion, Continuous  HYDROcodone-acetaminophen (NORCO) 5-325 MG per tablet 1 tablet, Q4H PRN   Or  HYDROcodone-acetaminophen (NORCO) 5-325 MG per tablet 2 tablet, Q4H PRN  insulin lispro (HUMALOG) injection vial 0-18 Units, TID WC  insulin lispro (HUMALOG) injection vial 0-9 Units, Nightly        All medication charted and reviewed. ALLERGIES     is allergic to bactrim [sulfamethoxazole-trimethoprim] and adhesive tape. FAMILY HISTORY     She indicated that her mother is . She indicated that her father is . She indicated that only one of her two sisters is alive. She indicated that her brother is alive. She indicated that her son is . family history includes Diabetes in her brother, father, mother, and sister; No Known Problems in her sister; Other in her son; Stroke in her mother. The patient denies any pertinent family history. I have reviewed and agree with the family history entered. I have reviewed the Family History and it is not significant to the case    SOCIAL HISTORY      reports that she has never smoked. She has never used smokeless tobacco. She reports previous alcohol use. She reports previous drug use. Drugs: Marijuana and Cocaine. I have reviewed and agree with all Social.  There are no concerns for substance abuse/use. PHYSICAL EXAM     INITIAL VITALS:  weight is 250 lb (113.4 kg). Her temperature is 97.9 °F (36.6 °C). Her blood pressure is 139/93 (abnormal) and her pulse is 106. Her respiration is 22 and oxygen saturation is 98%. Physical Exam  Vitals and nursing note reviewed. HENT:      Head: Normocephalic. Mouth/Throat:      Mouth: Mucous membranes are moist.   Eyes:      Extraocular Movements: Extraocular movements intact. Pupils: Pupils are equal, round, and reactive to light. Cardiovascular:      Rate and Rhythm: Normal rate. Pulses: Normal pulses. Heart sounds: Normal heart sounds. Pulmonary:      Effort: Pulmonary effort is normal.      Breath sounds: Normal breath sounds.    Abdominal: General: Bowel sounds are normal.   Musculoskeletal:         General: Normal range of motion. Skin:     General: Skin is warm and dry. Neurological:      General: No focal deficit present. Mental Status: She is alert and oriented to person, place, and time. Psychiatric:         Mood and Affect: Mood normal.             DIFFERENTIAL DIAGNOSIS/MDM:     DDx: Hyperglycemia, DKA, Rhabdomyolysis     DIAGNOSTIC RESULTS       RADIOLOGY:   I directly visualized the following  images and reviewed the radiologist interpretations:    No results found. LABS:  I have reviewed and interpreted all available lab results. Labs Reviewed   CBC WITH AUTO DIFFERENTIAL - Abnormal; Notable for the following components:       Result Value    RBC 3.33 (*)     Hemoglobin 10.2 (*)     Hematocrit 33.2 (*)     RDW 15.5 (*)     Lymphocytes 23 (*)     All other components within normal limits   COMPREHENSIVE METABOLIC PANEL W/ REFLEX TO MG FOR LOW K - Abnormal; Notable for the following components:    Glucose 743 (*)     BUN 33 (*)     CREATININE 1.06 (*)     Chloride 95 (*)     Anion Gap 18 (*)     Alkaline Phosphatase 109 (*)     Albumin/Globulin Ratio 0.9 (*)     GFR Non- 54 (*)     All other components within normal limits   LIPASE - Abnormal; Notable for the following components:    Lipase 193 (*)     All other components within normal limits   URINALYSIS - Abnormal; Notable for the following components:    Glucose, Ur 3+ (*)     Ketones, Urine SMALL (*)     Specific Gravity, UA 1.031 (*)     All other components within normal limits   C-REACTIVE PROTEIN - Abnormal; Notable for the following components:    CRP 30.4 (*)     All other components within normal limits   CK - Abnormal; Notable for the following components:     Total  (*)     All other components within normal limits   MYOGLOBIN, SERUM - Abnormal; Notable for the following components:    Myoglobin 114 (*)     All other components within normal limits   BETA-HYDROXYBUTYRATE - Abnormal; Notable for the following components:    Beta-Hydroxybutyrate 2.49 (*)     All other components within normal limits   ELECTROLYTES PLUS - Abnormal; Notable for the following components:    POC Sodium 133 (*)     POC Potassium 4.9 (*)     All other components within normal limits   OSMOLALITY - Abnormal; Notable for the following components:    Serum Osmolality 328 (*)     All other components within normal limits   VENOUS BLOOD GAS, POINT OF CARE - Abnormal; Notable for the following components:    pCO2, Alex 39.7 (*)     pO2, Alex 77.2 (*)     O2 Sat, Alex 95 (*)     All other components within normal limits   CREATININE W/GFR POINT OF CARE - Abnormal; Notable for the following components:    GFR Comment 57 (*)     GFR Non- 47 (*)     All other components within normal limits   UREA N (POC) - Abnormal; Notable for the following components:    POC BUN 34 (*)     All other components within normal limits   POCT GLUCOSE - Abnormal; Notable for the following components:    POC Glucose >700 (*)     All other components within normal limits   POC GLUCOSE FINGERSTICK - Abnormal; Notable for the following components:    POC Glucose 490 (*)     All other components within normal limits   POC GLUCOSE FINGERSTICK - Abnormal; Notable for the following components:    POC Glucose 348 (*)     All other components within normal limits   CULTURE, URINE   COVID-19, RAPID   HGB/HCT   CALCIUM, IONIC (POC)   LACTIC ACID,POINT OF CARE   POC CHEMISTRY (NA,K,ICA,GLU,CALC HCT/HGB,LACTATE,CREA,CL)       SCREENING TOOLS:    HEART Risk Score for Chest Pain Patients   History and Physical Exam Suspicion Level  (Nausea, Vomiting, Diaphoresis, Radiation, Exertion)   Slightly Suspicious (0 pts)   Moderately Suspicious (1 pt)   Highly Suspicious (2 pts)   EKG Interpretation   Normal (0 pts)   Non-Specific Repolarization Disturbance (1 pt)   Significant ST-Depression (2 pts)   Age of Patient (in years)   = 39 (0 pts)   46-64 (1 pt)   = 65 (2 pts)   Risk Factors   No Risk Factors (0 pts)   1-2 Risk Factors (1 pt)   = 3 Risk Factors (2 pts)   Risk Factors Include:   Hypercholesterolemia   Hypertension   Diabetes Mellitus   Cigarette smoking   Positive family history   Obesity   CAD   (SLE, CKDz, HIV, Cocaine abuse)   Troponin Levels   = Normal Limit (0 pts)   1-3 Times Normal Limit (1 pt)   > 3 Times Normal Limit (2 pts)  TOTAL:    Percent Risk for Major Adverse Cardiac Event (MACE)  0-3 pts indicates low risk for MACE   2.5% (DISCHARGE)   4-7 pts indicates moderate risk for MACE  20.3% (OBS)  8-10 pts indicates high risk for MACE  72.7% (EARLY INVASIVE [place])    CDU DARRICK / Stephanie Augustine is a 49 y.o. female who presents after a fall at home where patient states she lost her balance and landed on her back and was there for approximately 2 days. Upon arrival patient was found to have a blood sugar in the 700s and states she was unable to take her medication as she lives alone and had no assistance. Given clinical presentation will admit for further observation, evaluation and symptom management    1. PT/OT evaluation  · Symptom management  · Blood sugar control  · Continue home medications and pain control  · Monitor vitals, labs, and imaging  · DISPO: pending consults and clinical improvement    CONSULTS:    IP CONSULT TO IV TEAM    PROCEDURES:  Not indicated       PATIENT REFERRED TO:    No follow-up provider specified. --  My Supervising Physician for today is Dr. Suresh Mo  We discussed and agreed upon a treatment plan  Chriss Decker, [address] [place] [address]   Emergency Medicine Resident     This dictation was generated by voice recognition computer software. Although all attempts are made to edit the dictation for accuracy, there may be errors in the transcription that are not intended.

## 2021-06-08 NOTE — PROGRESS NOTES
Pts peripheral IV was removed inadvertently by pt. She is waiting on placement for facility vs home with home health. Spoke to Dr. Pepe Grande who stated that it's okay for pt not to have IV if drinking adequate fluids.

## 2021-06-08 NOTE — PROGRESS NOTES
OBS/CDU   RESIDENT NOTE      Patients PCP is:  Kerry Mims MD        SUBJECTIVE      She presented after a fall and being on the floor for 2 days. No evidence of rhabdomyolysis on initial work-up. Patient admitted for further coordination of care at home, hyperglycemia, and pain control. On evaluation this morning, patient feels achy all over. She has been nauseous at times as well, but still interested in eating. Blood glucose was 348 on arrival, this morning was 275. No fevers, chills, chest pain, shortness of breath, urinary or bowel complaints. PHYSICAL EXAM      General: NAD, AO X 3  Heent: EMOI, PERRL  Neck: SUPPLE, NO JVD  Cardiovascular: RRR, S1S2  Pulmonary: CTAB, NO SOB  Abdomen: SOFT, diffuse mild tenderness to palpation, ND, +BS  Extremities: +2/4 PULSES DISTAL, NO SWELLING, diffuse tenderness to palpation  Neuro / Psych: NO NUMBNESS OR TINGLING, MENTATION AT BASELINE    PERTINENT TEST /EXAMS      I have reviewed all available laboratory results.     MEDICATIONS CURRENT   glucose (GLUTOSE) 40 % oral gel 15 g, PRN  dextrose 50 % IV solution, PRN  glucagon (rDNA) injection 1 mg, PRN  dextrose 5 % solution, PRN  traZODone (DESYREL) tablet 150 mg, Nightly  pantoprazole (PROTONIX) tablet 40 mg, BID AC  sodium chloride flush 0.9 % injection 5-40 mL, 2 times per day  sodium chloride flush 0.9 % injection 5-40 mL, PRN  0.9 % sodium chloride infusion, PRN  enoxaparin (LOVENOX) injection 40 mg, Daily  acetaminophen (TYLENOL) tablet 650 mg, Q4H PRN  ondansetron (ZOFRAN-ODT) disintegrating tablet 4 mg, Q8H PRN   Or  ondansetron (ZOFRAN) injection 4 mg, Q6H PRN  0.9 % sodium chloride infusion, Continuous  HYDROcodone-acetaminophen (NORCO) 5-325 MG per tablet 1 tablet, Q4H PRN   Or  HYDROcodone-acetaminophen (NORCO) 5-325 MG per tablet 2 tablet, Q4H PRN  insulin lispro (HUMALOG) injection vial 0-18 Units, TID WC  insulin lispro (HUMALOG) injection vial 0-9 Units, Nightly        All medication charted and reviewed. CONSULTS      IP CONSULT TO IV TEAM    ASSESSMENT/PLAN        Linh Vazquez is a 49 y.o. female who presents after a fall at home where patient states she lost her balance and landed on her back and was there for approximately 2 days. Upon arrival patient was found to have a blood sugar in the 700s and states she was unable to take her medication as she lives alone and had no assistance. Given clinical presentation will admit for further observation, evaluation and symptom management     1. PT/OT evaluation  · Symptom management  · Blood sugar control  · Continue home medications and pain control  · Monitor vitals, labs, and imaging  · DISPO: pending consults and clinical improvement    --  Honorio Bishop, DO  Emergency Medicine Resident Physician     This dictation was generated by voice recognition computer software. Although all attempts are made to edit the dictation for accuracy, there may be errors in the transcription that are not intended.

## 2021-06-08 NOTE — PROGRESS NOTES
Physical Therapy    Facility/Department: [address] MED SURG  Initial Assessment    NAME: Cristine Malcolm  : 1965  MRN: [id number]    Date of Service: 2021  \"Terri Palmer is a 49 y. o.yo female who presents with hyperglcemia, found down, abdominal pain. Patient here after falling 2 days ago, was down for 2 days, states that she remembers the fall, she lost her balance, was down for 2 days because nobody helped her up. \"  Discharge Recommendations:    Further therapy recommended at discharge. PT Equipment Recommendations  Equipment Needed: Yes  Mobility Devices: Johnalaina Paredesa: Rolling    Assessment   Body structures, Functions, Activity limitations: Decreased functional mobility ; Decreased posture;Decreased endurance;Decreased ROM; Decreased strength;Decreased balance; Increased pain  Assessment: Pt ambulated 5ft w/ RW CGA, pt fatiguing quickly with ambulation this date. Pt would benefit from continued skilled physical therapy to address endurance and functional mobility deficits to return pt to prior level of independence. Prognosis: Good  Decision Making: Medium Complexity  PT Education: Goals;PT Role;Plan of Care  REQUIRES PT FOLLOW UP: Yes  Activity Tolerance  Activity Tolerance: Patient limited by endurance       Patient Diagnosis(es): The encounter diagnosis was Hyperglycemia.      has a past medical history of Acid reflux, Acute cystitis with hematuria, Acute non-recurrent maxillary sinusitis, Asthma, Bipolar 1 disorder ([address] Bipolar disorder, mixed ([address] BMI 34.0-34.9,adult, Cannabis use disorder, severe, dependence ([address] Cerebrovascular accident (CVA) ([address] Chest pain, Chronic renal insufficiency, Cocaine abuse ([address] DDD (degenerative disc disease), cervical, Diabetes mellitus ([address] Dizziness, Fibromyalgia, History of stroke, Homicidal ideation, Hyperglycemia, Hypertension, Hypotension, IDDM (insulin dependent diabetes mellitus), Lupus ([address] Migraine, Neuropathy, Neuropathy, Polysubstance abuse ([facility] Post traumatic stress disorder (PTSD), Posttraumatic stress disorder, Recurrent depression ([facility] Recurrent major depressive disorder, in partial remission ([facility] Screening mammogram, encounter for, Severe recurrent major depression with psychotic features ([facility] Severe recurrent major depression without psychotic features ([address] Stroke (cerebrum) ([facility] Stroke ([facility] Suicidal ideation, Suicidal intent, Vitamin D deficiency, and White matter changes. has a past surgical history that includes Abscess Drainage; chest tube insertion; Abdomen surgery; Cataract removal with implant (Bilateral); LASIK (Bilateral); amputation (Left, 03/13/2021); and Finger amputation (Left, 3/13/2021).     Restrictions  Restrictions/Precautions  Restrictions/Precautions: Up as Tolerated, Fall Risk  Required Braces or Orthoses?: No  Vision/Hearing  Vision: Within Functional Limits  Hearing: Within functional limits     Subjective  General  Patient assessed for rehabilitation services?: Yes  Response To Previous Treatment: Not applicable  Family / Caregiver Present: No  Follows Commands: Within Functional Limits  Subjective  Subjective: RN and pt in agreement for PT eval; pt supine in bed upon PT arrival, pt tearful throughout session, reporting recent stress with living situation, however, cooperative throughout  Pain Screening  Patient Currently in Pain: Yes  Pain Assessment  Pain Assessment: 0-10  Pain Level: 10  Pain Type: Chronic pain  Pain Location: Generalized  Pain Descriptors: Discomfort  Non-Pharmaceutical Pain Intervention(s): Ambulation/Increased Activity;Repositioned  Response to Pain Intervention: Patient Satisfied  Multiple Pain Sites: No  Vital Signs  Patient Currently in Pain: Yes       Orientation  Orientation  Overall Orientation Status: Within Functional Limits  Social/Functional History  Social/Functional History  Lives With: Alone  Type of Home: Apartment (4th floor)  Home Layout: One level  Home Access: Elevator, Ramped entrance  Bathroom Shower/Tub: Tub/Shower unit  Bathroom Toilet: Standard  Bathroom Equipment: Built-in shower seat, Grab bars in shower  Home Equipment: 4 wheeled walker (discharged home with rollator from nursing facility, but reports it broke. No other AD.)  ADL Assistance: Needs assistance (Pt reports she has been unable to complete her self care tasks properly since being home and requires assistance. Unable to get to the toilet at times and ends up going around house.)  Homemaking Assistance: Needs assistance (Has not been able to complete homemaking tasks and reports her house is very deconditioned.)  Homemaking Responsibilities: Yes (Inable to complete per pt)  Ambulation Assistance: Needs assistance (Unable to walk around apartment as needed to complete tasks with frequent falls, does not have assistance from anyone)  Transfer Assistance: Needs assistance  Active : No  Additional Comments: Pt tearful about not being able to complete self care or homemaking tasks. Does not have support from family or friends if needed upon discharge. Pt reports home is deconditioned and she does not have any furniture in home at this time. Cognition   Cognition  Overall Cognitive Status: Exceptions  Arousal/Alertness: Appropriate responses to stimuli  Following Commands: Follows one step commands with repetition; Follows one step commands with increased time  Attention Span: Appears intact  Memory: Appears intact  Initiation: Requires cues for some  Sequencing: Requires cues for some    Objective  Joint Mobility  Spine: WFL  ROM RLE: WFL  ROM LLE: WFL  ROM RUE: WFL  ROM LUE: WFL  Strength RLE  Strength RLE: WFL  Strength LLE  Strength LLE: WFL  Strength RUE  Strength RUE: WFL  Strength LUE  Strength LUE: WFL     Sensation  Overall Sensation Status: Impaired (Chronic numbness/tingling in B hands and B LEs)  Bed mobility  Supine to Sit: Contact guard assistance (Simultaneous filing.  User may not have seen previous data.)  Sit to Supine: Minimal assistance (Assist provided for BLE progression  Simultaneous filing. User may not have seen previous data.)  Scooting: Contact guard assistance  Comment: HOB elevated with use of bed rails (Simultaneous filing. User may not have seen previous data.)     Ambulation  Ambulation?: Yes  Ambulation 1  Surface: level tile  Device: Rolling Walker  Assistance: Contact guard assistance  Gait Deviations: Decreased step length;Shuffles; Slow Sonia  Distance: 5ft along EOB  Comments: Pt tearful throughout ambulation, reporting \"I just can't walk anymore\", however, unable to give writer a reason why this date. Vitals WNL. Pt required verbal cueing for RW progression with fair return demo. RN notified and aware.   Stairs/Curb  Stairs?: No     Balance  Posture: Fair  Sitting - Static: Good  Sitting - Dynamic: Good  Standing - Static: Good;-  Standing - Dynamic: Fair;+  Comments: Standing balance assessed w/ RW; pt able to sit EOB SBA        Plan   Plan  Times per week: 5-6x/week  Current Treatment Recommendations: Strengthening, Transfer Training, Endurance Training, Patient/Caregiver Education & Training, ROM, Equipment Evaluation, Education, & procurement, Balance Training, Gait Training, Home Exercise Program, Functional Mobility Training, Stair training, Safety Education & Training  Safety Devices  Type of devices: Left in bed, Bed alarm in place, Call light within reach, Gait belt, Nurse notified  Restraints  Initially in place: No    AM-PAC Score     AM-PAC Inpatient Mobility without Stair Climbing Raw Score : 16 (06/08/21 1556)  AM-PAC Inpatient without Stair Climbing T-Scale Score : 45.54 (06/08/21 1556)  Mobility Inpatient CMS 0-100% Score: 40.64 (06/08/21 1556)  Mobility Inpatient without Stair CMS G-Code Modifier : CK (06/08/21 1556)       Goals  Short term goals  Time Frame for Short term goals: 14  Short term goal 1: Pt to perform bed mobility and functional transfers independently  Short term goal 2: Ambulate 200ft w/ RW independently  Short term goal 3: Demonstrate standing dynamic balance of good - to decrease fall risk with standing tasks  Short term goal 4: Tolerate 30 minutes of therapy to demo increased endurance  Patient Goals   Patient goals :  To go home       Therapy Time   Individual Concurrent Group Co-treatment   Time In 1315         Time Out 1334         Minutes 19         Timed Code Treatment Minutes: 8 Minutes       Genet Angeles, PT

## 2021-06-08 NOTE — PROGRESS NOTES
[facility] [employer]  CDU / OBSERVATION ENCOUNTER  ATTENDING NOTE       I performed a history and physical examination of the patient and discussed management with the resident or midlevel provider. I reviewed the resident or midlevel provider's note and agree with the documented findings and plan of care. Any areas of disagreement are noted on the chart. I was personally present for the key portions of any procedures. I have documented in the chart those procedures where I was not present during the key portions. I have reviewed the nurses notes. I agree with the chief complaint, past medical history, past surgical history, allergies, medications, social and family history as documented unless otherwise noted below. The Family history, social history, and ROS are effectively unchanged since admission unless noted elsewhere in the chart. Patient with fall at home. Patient with difficulty getting up. Patient without evidence of rhabdomyolysis but lives alone and significant difficulties with ADLs. Patient had wanted to leave from ED last night as sugars had resolved but was kept due to concerns for safety. Patient on sliding scale overnight. Sugars again elevated this morning. We will recheck labs looking for evidence of DKA. Bobby Montilla was evaluated today and a DME order was entered for a wheeled walker because she requires this to successfully complete daily living tasks of ambulating. A wheeled walker is necessary due to the patient's unsteady gait, upper body weakness, and inability to  an ambulation device; and she can ambulate only by pushing a walker instead of a lesser assistive device such as a cane, crutch, or standard walker. The need for this equipment was discussed with the patient and she understands and is in agreement.     Terri Palmer requires a bedside commode due to being confined to a single room, and is physically incapable of utilizing regular toilet facilities. Current body weight is Weight: 250 lb (113.4 kg). Anai Pretty was evaluated today and a DME order was entered for variable height hospital bed because she requires assistance for positioning needs not possible in an ordinary bed. Patient needs variability of bed height to perform patient transfers and for personal cares. Current body Weight: 250 lb (113.4 kg). The need for this equipment was discussed with the patient and she understands and is in agreement.     Geovani Boyd MD  Attending Emergency  Physician

## 2021-06-08 NOTE — FLOWSHEET NOTE
feelings/needs/concerns;Expressed gratitude     Electronically signed by Samantha Link on 6/7/2021 at 9:56 PM

## 2021-06-08 NOTE — PROGRESS NOTES
Occupational Therapy   Occupational Therapy Initial Assessment  Date: 2021   Patient Name: Priya Luong  MRN: [id number]     : 1965    Date of Service: 2021     Chief Complaint   Patient presents with    Hyperglycemia        Priya Luong is a 49 y. o.yo female who presents with hyperglcemia, found down, abdominal pain. Patient here after falling 2 days ago, was down for 2 days, states that she remembers the fall, she lost her balance, was down for 2 days because nobody helped her up. States that she lives in a [facility] center and has recently lost her . Has a history of diabetes and lupus, has not taken her insulin for 2 days. States she is having abdominal pain but denies chest pain or shortness of breath, denies nausea or vomiting, states she is having a headache. Recent fevers or chills, states that she feels weak and unable to move. Discharge Recommendations:  Patient would benefit from continued therapy after discharge  OT Equipment Recommendations  Equipment Needed: Yes  Mobility Devices: ADL Assistive Devices; Caretha Lick: Rolling  ADL Assistive Devices: Toileting - Drop Arm Commode, Heavy Duty Drop Arm Commode;Grab Bars - shower; Toileting - Raised Toilet Seat with arms; Reacher;Sock-Aid Hard;Long-handled Sponge    Assessment   Performance deficits / Impairments: Decreased functional mobility ; Decreased ADL status; Decreased safe awareness;Decreased endurance;Decreased strength;Decreased ROM; Decreased cognition;Decreased balance;Decreased high-level IADLs  Assessment: Pt required CGA-Min A for bed mobility, Min A for functional transfers, CGA  for static standing balance and Min A for functional side steps to [facility] for walker navigation with RW. Pt completed UB/LB bathing with Mod A, grooming tasks SBA, UB dressing with Min A, and LB dressing with Max A. Pt limited by decreased endurance, decreased strength and increased pain.  Pt is expected to require skilled OT services to maximize safety and increase independence in ADLs, IADLs and functional mobility tasks. Pt is unsafe to return home without skilled intervention and 24/7 assist to safely engage in all aspects of ADLs, IADLs and mobility tasks. Prognosis: Good  Decision Making: Medium Complexity  OT Education: OT Role;Energy Conservation;Plan of Care;Equipment;ADL Adaptive Strategies;Transfer Training  Patient Education: walker management, balance, activity promotion, endurance and deep breathing-good return  REQUIRES OT FOLLOW UP: Yes  Activity Tolerance  Activity Tolerance: Patient limited by fatigue;Patient limited by pain  Safety Devices  Safety Devices in place: Yes  Type of devices: Gait belt;Bed alarm in place; Patient at risk for falls;Call light within reach; Left in bed;Nurse notified  Restraints  Initially in place: No           Patient Diagnosis(es): The encounter diagnosis was Hyperglycemia.      has a past medical history of Acid reflux, Acute cystitis with hematuria, Acute non-recurrent maxillary sinusitis, Asthma, Bipolar 1 disorder ([address] Bipolar disorder, mixed ([address] BMI 34.0-34.9,adult, Cannabis use disorder, severe, dependence ([address] Cerebrovascular accident (CVA) ([address] Chest pain, Chronic renal insufficiency, Cocaine abuse ([address] DDD (degenerative disc disease), cervical, Diabetes mellitus ([address] Dizziness, Fibromyalgia, History of stroke, Homicidal ideation, Hyperglycemia, Hypertension, Hypotension, IDDM (insulin dependent diabetes mellitus), Lupus ([address] Migraine, Neuropathy, Neuropathy, Polysubstance abuse ([address] Post traumatic stress disorder (PTSD), Posttraumatic stress disorder, Recurrent depression ([address] Recurrent major depressive disorder, in partial remission ([address] Screening mammogram, encounter for, Severe recurrent major depression with psychotic features ([address] Severe recurrent major depression without psychotic features ([address] Stroke (cerebrum) ([address] Stroke ([address] Suicidal ideation, Suicidal assistance (Max A to doff/don socks sitting EOB despite encouragement to attempt figure four, pt declined)  Toileting: Moderate assistance;Setup; Increased time to complete  Additional Comments: Pt requested to wash up due to feeling itchy. Pt fatigued quickly throughout all tasks with education provided on education conservation tech and deep breathing tech. Tone RUE  RUE Tone: Normotonic  Tone LUE  LUE Tone: Normotonic  Coordination  Movements Are Fluid And Coordinated: Yes        Transfers  Sit to stand: Minimal assistance  Stand to sit: Minimal assistance  Transfer Comments: Use of RW     Cognition  Overall Cognitive Status: Exceptions  Arousal/Alertness: Appropriate responses to stimuli  Following Commands: Follows one step commands with repetition; Follows one step commands with increased time  Attention Span: Appears intact  Memory: Appears intact  Initiation: Requires cues for some  Sequencing: Requires cues for some        Sensation  Overall Sensation Status: Impaired (Chronic numbness/tingling in B hands and B LEs)        LUE AROM (degrees)  LUE AROM : Exceptions  LUE General AROM: All other planes WFL w/ exception of shoulder  L Shoulder Flexion 0-180: 0-90 d/t weakness  RUE AROM (degrees)  RUE AROM : Exceptions  RUE General AROM: All other planes WFL w/ exception of shoulder  R Shoulder Flexion 0-180: 0-90 d/t weakness  Right Hand AROM (degrees)  Right Hand AROM: WFL  LUE Strength  Gross LUE Strength: Exceptions to [facility]  L Shoulder Flex: 3-/5  L Shoulder Ext: 3-/5  L Elbow Flex: 4-/5  L Elbow Ext: 4-/5  L Hand General: 4-/5  RUE Strength  Gross RUE Strength: Exceptions to [facility]  R Shoulder Flex: 3-/5  R Shoulder Ext: 3-/5  R Elbow Flex: 4-/5  R Elbow Ext: 4-/5  R Hand General: 4-/5                   Plan   Plan  Times per week: 3-5x/week  Current Treatment Recommendations: Safety Education & Training, Balance Training, Patient/Caregiver Education & Training, Self-Care / ADL, Functional Mobility Training, Home Management Training, Equipment Evaluation, Education, & procurement, Endurance Training, Strengthening, ROM, Positioning, Pain Management    G-Code     OutComes Score                                                  AM-PAC Score        AM-PAC Inpatient Daily Activity Raw Score: 16 (06/08/21 1603)  AM-PAC Inpatient ADL T-Scale Score : 35.96 (06/08/21 1603)  ADL Inpatient CMS 0-100% Score: 53.32 (06/08/21 1603)  ADL Inpatient CMS G-Code Modifier : CK (06/08/21 1603)    Goals  Short term goals  Time Frame for Short term goals: By discharge, pt will:  Short term goal 1: Demo functional transfers with SBA, using LRD PRN  Short term goal 2: Demo functional mobility with SBA, using LRD PRN  Short term goal 3: Demo +7 minutes of standing balance during ADL/functional task with CGA  Short term goal 4: Demo UB ADLs with Mod I, setup, use of AE/DME PRN  Short term goal 5: Demo LB ADLs with Min A, setup, use of AE/DME PRN  Short term goal 6: Demo use of energy conservation tech independently throughout all functional tasks PRN  Short term goal 7: Demo good safety awareness throughout all functional tasks independently with 0 VCs       Therapy Time   Individual Concurrent Group Co-treatment   Time In 1316         Time Out 1352         Minutes 36         Timed Code Treatment Minutes: 23 Minutes       ALEXANDRA Campoverde/RUBINA

## 2021-06-08 NOTE — ED NOTES
Pt screaming and yelling. Pt states she is in pain and demanding some one to rub her legs. Pt was instructed that yelling and screaming is not appropriate. Pt put hand up and stated thank you. Nurse attempted to speak with pt again, pt interrupted with hand up stated thank you. Pt call light in reach.        Jean-Pierre Cleary RN  06/07/21 2004

## 2021-06-08 NOTE — DISCHARGE INSTR - COC
Continuity of Care Form    Patient Name: Mark Ribeiro   :  1965  MRN:  [id number]    Admit date:  2021  Discharge date: 21    Code Status Order: Full Code   Advance Directives:   [facility] Documentation       Date/Time Healthcare Directive Type of Healthcare Directive Copy in [address] Agent's Name Healthcare Agent's Phone Number    21 [phone]  No, patient does not have an advance directive for healthcare treatment -- -- -- -- --            Admitting Physician:  Elena Hathaway MD  PCP: Christine Dotson MD    Discharging Nurse: Emmanuelle Hawk Unit/Room#: 7952/4175-85  Discharging Unit Phone Number: [phone]    Emergency Contact:   Extended Emergency Contact Information  Primary Emergency Contact: Leora Palmer  Address: NOT AVAILABLE   [address] Phone: [phone]  Relation: Child  Secondary Emergency Contact: Tommy Cordero   [address] Phone: [phone]  Relation: Spouse    Past Surgical History:  Past Surgical History:   Procedure Laterality Date    ABDOMEN SURGERY      drain tube    ABSCESS DRAINAGE      right buttock    AMPUTATION Left 2021    ring finger left    CATARACT REMOVAL WITH IMPLANT Bilateral     CHEST TUBE INSERTION      FINGER AMPUTATION Left 3/13/2021    LEFT RING FINER AMPUTATION performed by Margoth Scherer MD at [address] [place] [address] Bilateral        Immunization History:   Immunization History   Administered Date(s) Administered    Tdap (Boostrix, Adacel) 2017, 2020       Active Problems:  Patient Active Problem List   Diagnosis Code    IDDM (insulin dependent diabetes mellitus) [id number]    Lupus ([facility] M32.9    Bipolar disorder, mixed ([facility] F31.60    Post traumatic stress disorder (PTSD) F43.10    Acute cystitis with hematuria N30.01    Hyperglycemia R73.9    Suicidal ideation R45.851    Arthritis M19.90    Chronic back pain M54.9, G89.29 Acid reflux K21.9    History of stroke Z86.73    Polysubstance abuse ([facility]) F19.10    Bipolar 1 disorder ([facility]) F31.9    Cocaine abuse ([facility] F14.10    Chest pain R07.9    Acute non-recurrent maxillary sinusitis J01.00    Acute respiratory failure with hypercapnia ([facility]) J96.02    Alcohol use disorder, severe, dependence ([facility]) F10.20    Asthma exacerbation attacks J45.901    Bilateral edema of lower extremity R60.0    Bipolar 1 disorder, depressed, severe ([facility]) F31.4    BMI 34.0-34.9,adult Z68.34    Cannabis use disorder, severe, dependence ([facility]) F12.20    Cocaine use disorder, severe, dependence ([facility]) F14.20    Dizziness R42    Dyslipidemia E78.5    Family history of colon cancer Z80.0    History of lupus [id number]    Homicidal ideation R45.850    Hypotension I95.9    Neuropathy G62.9    Normocytic anemia D64.9    Recurrent depression ([facility]) F33.9    Recurrent major depressive disorder, in partial remission ([facility] F33.41    Severe recurrent major depression with psychotic features ([facility]) F33.3    Severe recurrent major depression without psychotic features ([facility] F33.2    Upper GI bleed K92.2    Vitamin D deficiency E55.9    White matter changes [id number]    Gastroesophageal reflux disease K21.9    Stroke (cerebrum) ([facility]) I63.9    Rib lesion M89.9    Type 2 diabetes mellitus ([facility]) E11.9    YAEL (generalized anxiety disorder) F41.1    Posttraumatic stress disorder F43.10    Suicidal intent R45.851    Leg weakness, bilateral R29.898    Poorly controlled diabetes mellitus ([address] E11.65    Acute kidney injury ([address] N17.9    Felon of finger 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.019    Osteomyelitis ([address] M86.9       Isolation/Infection:   Isolation            No Isolation          Patient Infection Status       Infection Onset Added Last Indicated Last Indicated By Review Planned Expiration Resolved Resolved By    MRSA 02/24/21 02/27/21 02/24/21 Culture, Anaerobic and Aerobic        Finger 2/2021    Resolved    COVID-19 Rule Out 03/13/21 03/13/21 03/13/21 COVID-19, Rapid (Ordered)   03/13/21 Rule-Out Test Resulted            Nurse Assessment:  Last Vital Signs: /79   Pulse 91   Temp 98.1 °F (36.7 °C) (Oral)   Resp 20   Wt 250 lb (113.4 kg)   LMP  (LMP Unknown)   SpO2 96%   BMI 40.35 kg/m²     Last documented pain score (0-10 scale): Pain Level: 10  Last Weight:   Wt Readings from Last 1 Encounters:   06/07/21 250 lb (113.4 kg)     Mental Status:   occas sleepy, occas seems slightly disoriented    IV Access:  - None    Nursing Mobility/ADLs:  Walking   Assisted  Transfer  Assisted  Bathing  Assisted  Dressing  Assisted  Toileting  Assisted  Feeding  Independent  Med Admin  Assisted  Med Delivery   whole    Wound Care Documentation and Therapy:        Elimination:  Continence: Bowel: Yes  Bladder: Yes  Urinary Catheter: None   Colostomy/Ileostomy/Ileal Conduit: No       Date of Last BM: 6-10-21  No intake or output data in the 24 hours ending 06/08/21 1143  No intake/output data recorded. Safety Concerns:     History of Falls (last 30 days) and At Risk for Falls    Impairments/Disabilities:      None    Nutrition Therapy:  Current Nutrition Therapy:   - Oral Diet:  Carb Control 4 carbs/meal (1800kcals/day)    Routes of Feeding: Oral  Liquids: No Restrictions  Daily Fluid Restriction: no  Last Modified Barium Swallow with Video (Video Swallowing Test): not done    Treatments at the Time of Hospital Discharge:   Respiratory Treatments: none  Oxygen Therapy:  is not on home oxygen therapy.   Ventilator:    - No ventilator support    Rehab Therapies: Physical Therapy and Occupational Therapy  Weight Bearing Status/Restrictions: No weight bearing restirctions  Other Medical Equipment (for information only, NOT a DME order):  walker  Other Treatments: none    Patient's personal belongings (please select all that are sent with patient):  None    RN SIGNATURE:  Electronically signed by Pita Xiong RN on 6/11/21 at 2:10 PM EDT    CASE MANAGEMENT/SOCIAL WORK SECTION    Inpatient Status Date: ***    Readmission Risk Assessment Score:  Readmission Risk              Risk of Unplanned Readmission:  0           Discharging to Facility/ [address] and [facility] Details  [address] [facility] [address]       Phone: [phone]       Fax: [phone]          / signature: Electronically signed by Nehemias Horn RN on 6/10/21 at 1:54 PM EDT    PHYSICIAN SECTION    Prognosis: Good    Condition at Discharge: Stable    Rehab Potential (if transferring to Rehab): Good    Recommended Labs or Other Treatments After Discharge:      Physician Certification: I certify the above information and transfer of Juan Ryan  is necessary for the continuing treatment of the diagnosis listed and that she requires Skilled nursing facility for Less than 30 days.      Update Admission H&P: No change in H&P    PHYSICIAN SIGNATURE:  Electronically signed by Ashlyn Garcia MD on 6/8/21 at 11:43 AM EDT

## 2021-06-08 NOTE — CARE COORDINATION
Case Management Initial Discharge Plan  Terri Palmer,             Met with:patient to discuss discharge plans. Information verified: address, contacts, phone number, , insurance Yes    Emergency Contact/Next of Kin name & number: rito    PCP: Samuel Mitchell MD  Date of last visit: few months    Insurance Provider: reinaldo    Discharge Planning    Living Arrangements:    lives alone    Home is an apartment  0 stairs to climb to get into front door,     Patient able to perform ADL's:Independent    Current Services (outpatient & in home) none  DME equipment: none      Receiving oral anticoagulation therapy? No          Potential Assistance Needed:   snf        Prior SNF/Rehab Placement and Facility: [facility] 2 wks   Agreeable to SNF/Rehab: Yes  [place] of choice provided: yes     Evaluation: yes      Patient expects to be discharged to:   snf    Transportation provider: [facility]  Transportation arrangements needed for discharge: Yes    Readmission Risk              Risk of Unplanned Readmission:  0             Does patient have a readmission risk score greater than 14?: No  If yes, follow-up appointment must be made within 7 days of discharge.      Goals of Care: get stronger      Discharge Plan: referral to [facility]            Electronically signed by Isa Sin RN on 21 at 5:48 PM EDT Attending Attestation (For Attendings USE Only)...

## 2021-06-08 NOTE — PROGRESS NOTES
[address]  CDU / OBSERVATION ENCOUNTER  ATTENDING NOTE       I performed a history and physical examination of the patient and discussed management with the resident or midlevel provider. I reviewed the resident or midlevel provider's note and agree with the documented findings and plan of care. Any areas of disagreement are noted on the chart. I was personally present for the key portions of any procedures. I have documented in the chart those procedures where I was not present during the key portions. I have reviewed the nurses notes. I agree with the chief complaint, past medical history, past surgical history, allergies, medications, social and family history as documented unless otherwise noted below. The Family history, social history, and ROS are effectively unchanged since admission unless noted elsewhere in the chart. No new medical complaint today. Patient continues to have sugars monitored. Patient has primary physician with [facility]. Patient is meeting inpatient criteria and it is appropriate to transfer to primary service. Discussed with [facility] attending. Scherry Primrose MD  Attending Emergency  Physician    CDU Transfer Summary        Patient:  Kolby Jerome  YOB: 1965    MRN: [id number]   Acct: [de-identified]    Primary Care Physician: Marshall Plaza MD    Admit date:  6/7/2021  2:28 PM  Transfer date: 6/12/2021    Transfer Diagnoses:     1.)  Generalized pain acute exacerbation on chronic. Patient with history of arthritis. Patient with fall at home and inability to get up. Patient for IV hydration and glucose control. 2.  Poor glucose control. Patient had increases made in insulin and sliding scale. Patient did require some dietary restriction and counseling.            Medication List      START taking these medications    cyclobenzaprine 10 MG tablet  Commonly known as: FLEXERIL  Take 1 tablet by mouth 3 times daily as needed for Muscle spasms        CHANGE how you take these medications    Lantus SoloStar 100 UNIT/ML injection pen  Generic drug: insulin glargine  Inject 20 Units into the skin 2 times daily  What changed:   · how much to take  · when to take this     metFORMIN 1000 MG tablet  Commonly known as: GLUCOPHAGE  TAKE 1 TABLET BY MOUTH DAILY WITH BREAKFAST  What changed: See the new instructions.         CONTINUE taking these medications    acetaminophen 325 MG tablet  Commonly known as: TYLENOL  TAKE 2 TABLETS BY MOUTH EVERY 6 HOURS AS NEEDED FOR PAIN     albuterol sulfate  (90 Base) MCG/ACT inhaler  Inhale 2 puffs into the lungs every 4 hours as needed for Wheezing     Alcohol Prep 70 % Pads  1 each by Does not apply route as needed (use as needed) Use_____times per day  Diagnosis: 250.0   Diabetes ___Insulin-Dependent___Non-Insulin Dependent     benzonatate 200 MG capsule  Commonly known as: TESSALON     Betadine Swab Aid 10 % Swab  Generic drug: Povidone-Iodine Scrub Sponge  Apply 1 Stick topically 2 times daily     budesonide-formoterol 80-4.5 MCG/ACT Aero  Commonly known as: Symbicort  Inhale 2 puffs into the lungs 2 times daily     celecoxib 200 MG capsule  Commonly known as: CELEBREX     cetirizine 10 MG tablet  Commonly known as: ZYRTEC  Take 1 tablet by mouth daily     dicyclomine 20 MG tablet  Commonly known as: Bentyl  Take 1 tablet by mouth 3 times daily as needed (for abdominal discomfort)      MG capsule  Generic drug: docusate sodium  TAKE 1 CAPSULE BY MOUTH TWICE DAILY     Flovent  MCG/ACT inhaler  Generic drug: fluticasone  Inhale 2 puffs into the lungs 2 times daily     FreeStyle Lancets Misc  1 each by Does not apply route 3 times daily     FREESTYLE LITE strip  Generic drug: blood glucose test strips  1 each by Does not apply route 3 times daily     gabapentin 300 MG capsule  Commonly known as: NEURONTIN  TAKE 3 CAPSULES 900 MG BY MOUTH THREE TIMES DAILY     [place] - 34.8 g/dL    RDW 15.5 (H) 11.8 - 14.4 %    Platelets 323 094 - 554 k/uL    MPV 11.7 8.1 - 13.5 fL    NRBC Automated 0.0 0.0 per 100 WBC    Differential Type NOT REPORTED     Seg Neutrophils 63 36 - 65 %    Lymphocytes 23 (L) 24 - 43 %    Monocytes 11 3 - 12 %    Eosinophils % 3 1 - 4 %    Basophils 0 0 - 2 %    Immature Granulocytes 0 0 %    Segs Absolute 4.79 1.50 - 8.10 k/uL    Absolute Lymph # 1.71 1.10 - 3.70 k/uL    Absolute Mono # 0.86 0.10 - 1.20 k/uL    Absolute Eos # 0.19 0.00 - 0.44 k/uL    Basophils Absolute 0.03 0.00 - 0.20 k/uL    Absolute Immature Granulocyte 0.03 0.00 - 0.30 k/uL    WBC Morphology NOT REPORTED     RBC Morphology ANISOCYTOSIS PRESENT     Platelet Estimate NOT REPORTED    Comprehensive Metabolic Panel w/ Reflex to MG   Result Value Ref Range    Glucose 743 (HH) 70 - 99 mg/dL    BUN 33 (H) 6 - 20 mg/dL    CREATININE 1.06 (H) 0.50 - 0.90 mg/dL    Bun/Cre Ratio NOT REPORTED 9 - 20    Calcium 8.9 8.6 - 10.4 mg/dL    Sodium 136 135 - 144 mmol/L    Potassium 5.0 3.7 - 5.3 mmol/L    Chloride 95 (L) 98 - 107 mmol/L    CO2 23 20 - 31 mmol/L    Anion Gap 18 (H) 9 - 17 mmol/L    Alkaline Phosphatase 109 (H) 35 - 104 U/L    ALT 9 5 - 33 U/L    AST 9 <32 U/L    Total Bilirubin 0.33 0.3 - 1.2 mg/dL    Total Protein 7.6 6.4 - 8.3 g/dL    Albumin 3.6 3.5 - 5.2 g/dL    Albumin/Globulin Ratio 0.9 (L) 1.0 - 2.5    GFR Non- 54 (L) >60 mL/min    GFR African American >60 >60 mL/min    GFR Comment          GFR Staging NOT REPORTED    Lipase   Result Value Ref Range    Lipase 193 (H) 13 - 60 U/L   Urinalysis, reflex to microscopic   Result Value Ref Range    Color, UA YELLOW YELLOW    Turbidity UA CLEAR CLEAR    Glucose, Ur 3+ (A) NEGATIVE    Bilirubin Urine NEGATIVE NEGATIVE    Ketones, Urine SMALL (A) NEGATIVE    Specific Gravity, UA 1.031 (H) 1.005 - 1.030    Urine Hgb NEGATIVE NEGATIVE    pH, UA 6.0 5.0 - 8.0    Protein, UA NEGATIVE NEGATIVE    Urobilinogen, Urine Normal Normal Nitrite, Urine NEGATIVE NEGATIVE    Leukocyte Esterase, Urine NEGATIVE NEGATIVE    Urinalysis Comments       Microscopic exam not performed based on chemical results unless requested in original order.    C-Reactive Protein   Result Value Ref Range    CRP 30.4 (H) 0.0 - 5.0 mg/L   CK   Result Value Ref Range    Total  (H) 26 - 192 U/L   MYOGLOBIN, SERUM   Result Value Ref Range    Myoglobin 114 (H) 25 - 58 ng/mL   BETA-HYDROXYBUTYRATE   Result Value Ref Range    Beta-Hydroxybutyrate 2.49 (H) 0.02 - 0.27 mmol/L   ELECTROLYTES PLUS   Result Value Ref Range    POC Sodium 133 (L) 138 - 146 mmol/L    POC Potassium 4.9 (H) 3.5 - 4.5 mmol/L    POC Chloride 99 98 - 107 mmol/L    POC TCO2 25 22 - 30 mmol/L    Anion Gap 10 7 - 16 mmol/L   Hemoglobin and hematocrit, blood   Result Value Ref Range    POC Hemoglobin 12.3 12.0 - 16.0 g/dL    POC Hematocrit 36 36 - 46 %   CALCIUM, IONIC (POC)   Result Value Ref Range    POC Ionized Calcium 1.17 1.15 - 1.33 mmol/L   Osmolality   Result Value Ref Range    Serum Osmolality 328 (HH) 275 - 295 mOsm/kg   Basic Metabolic Panel   Result Value Ref Range    Glucose 255 (H) 70 - 99 mg/dL    BUN 29 (H) 6 - 20 mg/dL    CREATININE 0.94 (H) 0.50 - 0.90 mg/dL    Bun/Cre Ratio NOT REPORTED 9 - 20    Calcium 8.8 8.6 - 10.4 mg/dL    Sodium 140 135 - 144 mmol/L    Potassium 4.2 3.7 - 5.3 mmol/L    Chloride 101 98 - 107 mmol/L    CO2 22 20 - 31 mmol/L    Anion Gap 17 9 - 17 mmol/L    GFR Non-African American >60 >60 mL/min    GFR African American >60 >60 mL/min    GFR Comment          GFR Staging NOT REPORTED    Lactic Acid, Plasma   Result Value Ref Range    Lactic Acid NOT REPORTED mmol/L    Lactic Acid, Whole Blood 1.3 0.7 - 2.1 mmol/L   Beta-Hydroxybutyrate   Result Value Ref Range    Beta-Hydroxybutyrate 2.41 (H) 0.02 - 0.27 mmol/L   Beta-Hydroxybutyrate   Result Value Ref Range    Beta-Hydroxybutyrate 1.36 (H) 0.02 - 0.27 mmol/L   Basic Metabolic Panel w/ Reflex to MG   Result Value Ref 1. 14 0.56 - 1.39 mmol/L   POCT Glucose   Result Value Ref Range    POC Glucose >700 (HH) 74 - 100 mg/dL   POC Glucose Fingerstick   Result Value Ref Range    POC Glucose 490 (HH) 65 - 105 mg/dL   POC Glucose Fingerstick   Result Value Ref Range    POC Glucose 348 (H) 65 - 105 mg/dL   POC Glucose Fingerstick   Result Value Ref Range    POC Glucose 186 (H) 65 - 105 mg/dL   POC Glucose Fingerstick   Result Value Ref Range    POC Glucose 275 (H) 65 - 105 mg/dL   POC Glucose Fingerstick   Result Value Ref Range    POC Glucose >600 (HH) 65 - 105 mg/dL   POC Glucose Fingerstick   Result Value Ref Range    POC Glucose 301 (H) 65 - 105 mg/dL   POC Glucose Fingerstick   Result Value Ref Range    POC Glucose 268 (H) 65 - 105 mg/dL   POC Glucose Fingerstick   Result Value Ref Range    POC Glucose 264 (H) 65 - 105 mg/dL   POC Glucose Fingerstick   Result Value Ref Range    POC Glucose 192 (H) 65 - 105 mg/dL   POC Glucose Fingerstick   Result Value Ref Range    POC Glucose 279 (H) 65 - 105 mg/dL   POC Glucose Fingerstick   Result Value Ref Range    POC Glucose 328 (H) 65 - 105 mg/dL   POC Glucose Fingerstick   Result Value Ref Range    POC Glucose 454 (HH) 65 - 105 mg/dL   POC Glucose Fingerstick   Result Value Ref Range    POC Glucose 448 (HH) 65 - 105 mg/dL   POC Glucose Fingerstick   Result Value Ref Range    POC Glucose 491 (HH) 65 - 105 mg/dL   POC Glucose Fingerstick   Result Value Ref Range    POC Glucose 332 (H) 65 - 105 mg/dL   POC Glucose Fingerstick   Result Value Ref Range    POC Glucose 270 (H) 65 - 105 mg/dL   POC Glucose Fingerstick   Result Value Ref Range    POC Glucose 536 (HH) 65 - 105 mg/dL   POC Glucose Fingerstick   Result Value Ref Range    POC Glucose 511 (HH) 65 - 105 mg/dL   POC Glucose Fingerstick   Result Value Ref Range    POC Glucose 463 (HH) 65 - 105 mg/dL   POC Glucose Fingerstick   Result Value Ref Range    POC Glucose 231 (H) 65 - 105 mg/dL   POC Glucose Fingerstick   Result Value Ref Range POC Glucose 247 (H) 65 - 105 mg/dL   POC Glucose Fingerstick   Result Value Ref Range    POC Glucose 337 (H) 65 - 105 mg/dL   POC Glucose Fingerstick   Result Value Ref Range    POC Glucose 319 (H) 65 - 105 mg/dL   POC Glucose Fingerstick   Result Value Ref Range    POC Glucose 219 (H) 65 - 105 mg/dL   POC Glucose Fingerstick   Result Value Ref Range    POC Glucose 187 (H) 65 - 105 mg/dL   POC Glucose Fingerstick   Result Value Ref Range    POC Glucose 227 (H) 65 - 105 mg/dL   POC Glucose Fingerstick   Result Value Ref Range    POC Glucose 130 (H) 65 - 105 mg/dL   POC Glucose Fingerstick   Result Value Ref Range    POC Glucose 220 (H) 65 - 105 mg/dL   POC Glucose Fingerstick   Result Value Ref Range    POC Glucose 170 (H) 65 - 105 mg/dL   POC Glucose Fingerstick   Result Value Ref Range    POC Glucose 164 (H) 65 - 105 mg/dL   EKG 12 Lead   Result Value Ref Range    Ventricular Rate 95 BPM    Atrial Rate 95 BPM    P-R Interval 126 ms    QRS Duration 86 ms    Q-T Interval 374 ms    QTc Calculation (Bazett) 469 ms    P Axis 71 degrees    R Axis 4 degrees    T Axis 47 degrees     No results found. Physical Exam:    General appearance - NAD, AOx 3    Lungs -CTAB, no R/R/R  Heart - RRR, no M/R/G  Abdomen - Soft, NT/ND  Neurological:  MAEx4, No focal motor deficit, sensory loss  Extremities - Cap refil <2 sec in all ext., no edema  Skin -warm, dry      Hospital Course:  Clinical course has improved, labs and imaging reviewed. Mark Ribeiro originally presented to the hospital on 6/7/2021  2:28 PM with mild DKA and having fallen with need for physical therapy and evaluation. .  At that time it was determined that She required further observation and hydration, glucose control and evaluation by PT and OT. Patient was not felt to be safe at home due to difficulty in getting up. Patient was evaluated by physical therapy after resolution of glucose. Patient was felt to need placement.   Placement issues currently being completed. Patient was admitted to pursue family practice service as the primary physicians. Patient meeting inpatient criteria now. Pt discussed directly with the admitting team    Disposition: Transfer  Condition: Good    Time Spent: 3 day      --  Nick Pizano MD  Emergency Medicine Attending Physician    This dictation was generated by voice recognition computer software. Although all attempts are made to edit the dictation for accuracy, there may be errors in the transcription that are not intended.

## 2021-06-09 LAB
ANION GAP SERPL CALCULATED.3IONS-SCNC: 14 MMOL/L (ref 9–17)
BUN BLDV-MCNC: 23 MG/DL (ref 6–20)
BUN/CREAT BLD: ABNORMAL (ref 9–20)
CALCIUM SERPL-MCNC: 8.6 MG/DL (ref 8.6–10.4)
CHLORIDE BLD-SCNC: 99 MMOL/L (ref 98–107)
CO2: 20 MMOL/L (ref 20–31)
CREAT SERPL-MCNC: 0.94 MG/DL (ref 0.5–0.9)
EKG ATRIAL RATE: 95 BPM
EKG P AXIS: 71 DEGREES
EKG P-R INTERVAL: 126 MS
EKG Q-T INTERVAL: 374 MS
EKG QRS DURATION: 86 MS
EKG QTC CALCULATION (BAZETT): 469 MS
EKG R AXIS: 4 DEGREES
EKG T AXIS: 47 DEGREES
EKG VENTRICULAR RATE: 95 BPM
GFR AFRICAN AMERICAN: >60 ML/MIN
GFR NON-AFRICAN AMERICAN: >60 ML/MIN
GFR SERPL CREATININE-BSD FRML MDRD: ABNORMAL ML/MIN/{1.73_M2}
GFR SERPL CREATININE-BSD FRML MDRD: ABNORMAL ML/MIN/{1.73_M2}
GLUCOSE BLD-MCNC: 270 MG/DL (ref 65–105)
GLUCOSE BLD-MCNC: 328 MG/DL (ref 65–105)
GLUCOSE BLD-MCNC: 332 MG/DL (ref 65–105)
GLUCOSE BLD-MCNC: 440 MG/DL (ref 70–99)
GLUCOSE BLD-MCNC: 448 MG/DL (ref 65–105)
GLUCOSE BLD-MCNC: 454 MG/DL (ref 65–105)
GLUCOSE BLD-MCNC: 491 MG/DL (ref 65–105)
POTASSIUM SERPL-SCNC: 4 MMOL/L (ref 3.7–5.3)
SODIUM BLD-SCNC: 133 MMOL/L (ref 135–144)

## 2021-06-09 PROCEDURE — 96372 THER/PROPH/DIAG INJ SC/IM: CPT

## 2021-06-09 PROCEDURE — 97530 THERAPEUTIC ACTIVITIES: CPT

## 2021-06-09 PROCEDURE — 82947 ASSAY GLUCOSE BLOOD QUANT: CPT

## 2021-06-09 PROCEDURE — 2580000003 HC RX 258: Performed by: STUDENT IN AN ORGANIZED HEALTH CARE EDUCATION/TRAINING PROGRAM

## 2021-06-09 PROCEDURE — 6360000002 HC RX W HCPCS: Performed by: STUDENT IN AN ORGANIZED HEALTH CARE EDUCATION/TRAINING PROGRAM

## 2021-06-09 PROCEDURE — 76937 US GUIDE VASCULAR ACCESS: CPT

## 2021-06-09 PROCEDURE — 6370000000 HC RX 637 (ALT 250 FOR IP): Performed by: STUDENT IN AN ORGANIZED HEALTH CARE EDUCATION/TRAINING PROGRAM

## 2021-06-09 PROCEDURE — 36415 COLL VENOUS BLD VENIPUNCTURE: CPT

## 2021-06-09 PROCEDURE — 97116 GAIT TRAINING THERAPY: CPT

## 2021-06-09 PROCEDURE — 6370000000 HC RX 637 (ALT 250 FOR IP): Performed by: EMERGENCY MEDICINE

## 2021-06-09 PROCEDURE — 80048 BASIC METABOLIC PNL TOTAL CA: CPT

## 2021-06-09 PROCEDURE — 1200000000 HC SEMI PRIVATE

## 2021-06-09 RX ORDER — CYCLOBENZAPRINE HCL 10 MG
10 TABLET ORAL 3 TIMES DAILY PRN
Status: DISCONTINUED | OUTPATIENT
Start: 2021-06-09 | End: 2021-06-13

## 2021-06-09 RX ORDER — INSULIN GLARGINE 100 [IU]/ML
15 INJECTION, SOLUTION SUBCUTANEOUS NIGHTLY
Status: DISCONTINUED | OUTPATIENT
Start: 2021-06-09 | End: 2021-06-09

## 2021-06-09 RX ORDER — INSULIN GLARGINE 100 [IU]/ML
12 INJECTION, SOLUTION SUBCUTANEOUS 2 TIMES DAILY
Status: DISCONTINUED | OUTPATIENT
Start: 2021-06-09 | End: 2021-06-10

## 2021-06-09 RX ORDER — INSULIN GLARGINE 100 [IU]/ML
6 INJECTION, SOLUTION SUBCUTANEOUS ONCE
Status: COMPLETED | OUTPATIENT
Start: 2021-06-09 | End: 2021-06-09

## 2021-06-09 RX ADMIN — CYCLOBENZAPRINE 10 MG: 10 TABLET, FILM COATED ORAL at 16:19

## 2021-06-09 RX ADMIN — GABAPENTIN 900 MG: 300 CAPSULE ORAL at 15:29

## 2021-06-09 RX ADMIN — INSULIN GLARGINE 12 UNITS: 100 INJECTION, SOLUTION SUBCUTANEOUS at 21:49

## 2021-06-09 RX ADMIN — HYDROCODONE BITARTRATE AND ACETAMINOPHEN 2 TABLET: 5; 325 TABLET ORAL at 20:03

## 2021-06-09 RX ADMIN — INSULIN LISPRO 12 UNITS: 100 INJECTION, SOLUTION INTRAVENOUS; SUBCUTANEOUS at 16:19

## 2021-06-09 RX ADMIN — PANTOPRAZOLE SODIUM 40 MG: 40 TABLET, DELAYED RELEASE ORAL at 08:56

## 2021-06-09 RX ADMIN — INSULIN LISPRO 18 UNITS: 100 INJECTION, SOLUTION INTRAVENOUS; SUBCUTANEOUS at 11:34

## 2021-06-09 RX ADMIN — INSULIN LISPRO 5 UNITS: 100 INJECTION, SOLUTION INTRAVENOUS; SUBCUTANEOUS at 21:49

## 2021-06-09 RX ADMIN — INSULIN LISPRO 15 UNITS: 100 INJECTION, SOLUTION INTRAVENOUS; SUBCUTANEOUS at 14:09

## 2021-06-09 RX ADMIN — INSULIN GLARGINE 6 UNITS: 100 INJECTION, SOLUTION SUBCUTANEOUS at 11:34

## 2021-06-09 RX ADMIN — HYDROCODONE BITARTRATE AND ACETAMINOPHEN 2 TABLET: 5; 325 TABLET ORAL at 15:32

## 2021-06-09 RX ADMIN — INSULIN LISPRO 12 UNITS: 100 INJECTION, SOLUTION INTRAVENOUS; SUBCUTANEOUS at 08:59

## 2021-06-09 RX ADMIN — SODIUM CHLORIDE, PRESERVATIVE FREE 10 ML: 5 INJECTION INTRAVENOUS at 20:04

## 2021-06-09 RX ADMIN — GABAPENTIN 900 MG: 300 CAPSULE ORAL at 08:56

## 2021-06-09 RX ADMIN — ENOXAPARIN SODIUM 40 MG: 40 INJECTION, SOLUTION INTRAVENOUS; SUBCUTANEOUS at 08:56

## 2021-06-09 RX ADMIN — SODIUM CHLORIDE, PRESERVATIVE FREE 10 ML: 5 INJECTION INTRAVENOUS at 08:56

## 2021-06-09 RX ADMIN — TRAZODONE HYDROCHLORIDE 150 MG: 50 TABLET ORAL at 20:01

## 2021-06-09 RX ADMIN — HYDROCODONE BITARTRATE AND ACETAMINOPHEN 2 TABLET: 5; 325 TABLET ORAL at 08:59

## 2021-06-09 RX ADMIN — PANTOPRAZOLE SODIUM 40 MG: 40 TABLET, DELAYED RELEASE ORAL at 15:30

## 2021-06-09 RX ADMIN — GABAPENTIN 900 MG: 300 CAPSULE ORAL at 20:01

## 2021-06-09 ASSESSMENT — PAIN SCALES - GENERAL
PAINLEVEL_OUTOF10: 0
PAINLEVEL_OUTOF10: 9
PAINLEVEL_OUTOF10: 0
PAINLEVEL_OUTOF10: 4
PAINLEVEL_OUTOF10: 1
PAINLEVEL_OUTOF10: 10

## 2021-06-09 ASSESSMENT — PAIN DESCRIPTION - PROGRESSION
CLINICAL_PROGRESSION: NOT CHANGED

## 2021-06-09 ASSESSMENT — PAIN DESCRIPTION - LOCATION: LOCATION: GENERALIZED

## 2021-06-09 NOTE — PROGRESS NOTES
OBS/CDU   RESIDENT NOTE      Patients PCP is:  Owen Parekh MD        SUBJECTIVE      Patient presented after a fall and being on the floor for 2 days. No evidence of rhabdomyolysis on initial work-up. Patient admitted for further coordination of care at home, hyperglycemia, and pain control. On evaluation this morning, patient states she is feeling better today, her head is somewhat foggy. Case management social worker currently working on disposition for this patient. Patient would prefer not to go to skilled nursing facility if at all possible. Blood glucose continues to be elevated. Discussed this with the patient this morning and she reports taking Metformin and Lantus. Patient denies any abdominal pain, nausea, vomiting, chest pain, shortness of breath, other complaints. PHYSICAL EXAM      General: NAD, AO X 3  Heent: EMOI, PERRL  Neck: SUPPLE, NO JVD  Cardiovascular: RRR, S1S2  Pulmonary: CTAB, NO SOB  Abdomen: SOFT, NTTP, ND, +BS  Extremities: +2/4 PULSES DISTAL, NO SWELLING, moving all extremities equally   Neuro / Psych: NO NUMBNESS OR TINGLING, MENTATION AT BASELINE    PERTINENT TEST /EXAMS      I have reviewed all available laboratory results.     MEDICATIONS CURRENT   gabapentin (NEURONTIN) capsule 900 mg, TID  glucose (GLUTOSE) 40 % oral gel 15 g, PRN  dextrose 50 % IV solution, PRN  glucagon (rDNA) injection 1 mg, PRN  dextrose 5 % solution, PRN  traZODone (DESYREL) tablet 150 mg, Nightly  pantoprazole (PROTONIX) tablet 40 mg, BID AC  sodium chloride flush 0.9 % injection 5-40 mL, 2 times per day  sodium chloride flush 0.9 % injection 5-40 mL, PRN  0.9 % sodium chloride infusion, PRN  enoxaparin (LOVENOX) injection 40 mg, Daily  acetaminophen (TYLENOL) tablet 650 mg, Q4H PRN  ondansetron (ZOFRAN-ODT) disintegrating tablet 4 mg, Q8H PRN   Or  ondansetron (ZOFRAN) injection 4 mg, Q6H PRN  0.9 % sodium chloride infusion, Continuous  HYDROcodone-acetaminophen (NORCO) 5-325 MG per tablet 1 tablet, Q4H PRN   Or  HYDROcodone-acetaminophen (NORCO) 5-325 MG per tablet 2 tablet, Q4H PRN  insulin lispro (HUMALOG) injection vial 0-18 Units, TID WC  insulin lispro (HUMALOG) injection vial 0-9 Units, Nightly        All medication charted and reviewed. CONSULTS      IP CONSULT TO IV TEAM  IP CONSULT TO HOME CARE NEEDS  IP CONSULT TO SOCIAL WORK    ASSESSMENT/PLAN        Shamika Watson is a 49 y.o. female who presents after a fall at home where patient states she lost her balance and landed on her back and was there for approximately 2 days. Upon arrival patient was found to have a blood sugar in the 700s and states she was unable to take her medication as she lives alone and had no assistance. Given clinical presentation will admit for further observation, evaluation and symptom management     1. Fall at home  2. Hyperglycemia  · PT/OT eval completed  · Symptom management  · Blood sugar control - Add lantus  · Coordinate with SW/CM for dispo; patient prefers not to got to SNF and to have home health aide    · Continue home medications and pain control  · Monitor vitals, labs, and imaging  · DISPO: pending consults and clinical improvement    --  Carmen Yoder, DO  Emergency Medicine Resident Physician     This dictation was generated by voice recognition computer software. Although all attempts are made to edit the dictation for accuracy, there may be errors in the transcription that are not intended.

## 2021-06-09 NOTE — PROGRESS NOTES
Physical Therapy  Facility/Department: [address] MED SURG  Daily Treatment Note  NAME: Keara Forman  : 1965  MRN: [id number]    Date of Service: 2021    Discharge Recommendations:  Patient would benefit from continued therapy after discharge   PT Equipment Recommendations  Mobility Devices: Matthew Dame: Rolling    Assessment   Body structures, Functions, Activity limitations: Decreased functional mobility ; Decreased posture;Decreased endurance;Decreased ROM; Decreased strength;Decreased balance; Increased pain  Assessment: Pt ambulated 25ft w/ RW CGA, pt improving  with ambulation this date. Pt would benefit from continued skilled physical therapy to address endurance and functional mobility deficits to return pt to prior level of independence. Prognosis: Good  Decision Making: Medium Complexity  PT Education: Goals;PT Role;Plan of Care  REQUIRES PT FOLLOW UP: Yes  Activity Tolerance  Activity Tolerance: Patient limited by endurance; Patient limited by fatigue     Patient Diagnosis(es): The encounter diagnosis was Hyperglycemia.      has a past medical history of Acid reflux, Acute cystitis with hematuria, Acute non-recurrent maxillary sinusitis, Asthma, Bipolar 1 disorder ([address] Bipolar disorder, mixed ([address] BMI 34.0-34.9,adult, Cannabis use disorder, severe, dependence ([address] Cerebrovascular accident (CVA) ([address] Chest pain, Chronic renal insufficiency, Cocaine abuse ([address] DDD (degenerative disc disease), cervical, Diabetes mellitus ([address] Dizziness, Fibromyalgia, History of stroke, Homicidal ideation, Hyperglycemia, Hypertension, Hypotension, IDDM (insulin dependent diabetes mellitus), Lupus ([address] Migraine, Neuropathy, Neuropathy, Polysubstance abuse ([address] Post traumatic stress disorder (PTSD), Posttraumatic stress disorder, Recurrent depression ([address] Recurrent major depressive disorder, in partial remission ([address] Screening mammogram, encounter for, Severe recurrent major depression with Goals  Short term goals  Time Frame for Short term goals: 15  Short term goal 1: Pt to perform bed mobility and functional transfers independently  Short term goal 2: Ambulate 200ft w/ RW independently  Short term goal 3: Demonstrate standing dynamic balance of good - to decrease fall risk with standing tasks  Short term goal 4: Tolerate 30 minutes of therapy to demo increased endurance  Patient Goals   Patient goals :  To go home    Plan    Plan  Times per week: 5-6x/week  Current Treatment Recommendations: Strengthening, Transfer Training, Endurance Training, Patient/Caregiver Education & Training, ROM, Equipment Evaluation, Education, & procurement, Balance Training, Gait Training, Home Exercise Program, Functional Mobility Training, Stair training, Safety Education & Training  Safety Devices  Type of devices: Left in bed, Bed alarm in place, Call light within reach, Gait belt, Nurse notified  Restraints  Initially in place: No     Therapy Time   Individual Concurrent Group Co-treatment   Time In 1340         Time Out 1418         Minutes [facility], PT

## 2021-06-09 NOTE — PROGRESS NOTES
[address]  CDU / OBSERVATION ENCOUNTER  ATTENDING NOTE       I performed a history and physical examination of the patient and discussed management with the resident or midlevel provider. I reviewed the resident or midlevel provider's note and agree with the documented findings and plan of care. Any areas of disagreement are noted on the chart. I was personally present for the key portions of any procedures. I have documented in the chart those procedures where I was not present during the key portions. I have reviewed the nurses notes. I agree with the chief complaint, past medical history, past surgical history, allergies, medications, social and family history as documented unless otherwise noted below. The Family history, social history, and ROS are effectively unchanged since admission unless noted elsewhere in the chart. Patient with persistently high glucose. Patient had significant difficulty at home having been on the ground for almost 2 days. Patient states this is been difficult for her with weakness after Covid. Patient has arthralgias myalgias preventing some movement. Patient for PT OT evaluation. Patient has significant motivation to go home but is felt to be potentially unsafe. Patient with persistently high glucose here. Will reestablish IV line and recheck glucose. Patient to be restarted on intermediate acting insulin. Patient requiring ongoing glucose monitoring. Patient requiring ongoing monitoring for physical injury given generalized weakness. Patient for ongoing analgesia and reevaluation.     Monique Pritchett MD  Attending Emergency  Physician

## 2021-06-10 LAB
GLUCOSE BLD-MCNC: 231 MG/DL (ref 65–105)
GLUCOSE BLD-MCNC: 247 MG/DL (ref 65–105)
GLUCOSE BLD-MCNC: 463 MG/DL (ref 65–105)
GLUCOSE BLD-MCNC: 511 MG/DL (ref 65–105)
GLUCOSE BLD-MCNC: 536 MG/DL (ref 65–105)

## 2021-06-10 PROCEDURE — 6370000000 HC RX 637 (ALT 250 FOR IP): Performed by: STUDENT IN AN ORGANIZED HEALTH CARE EDUCATION/TRAINING PROGRAM

## 2021-06-10 PROCEDURE — 97110 THERAPEUTIC EXERCISES: CPT

## 2021-06-10 PROCEDURE — 97530 THERAPEUTIC ACTIVITIES: CPT

## 2021-06-10 PROCEDURE — 2580000003 HC RX 258: Performed by: STUDENT IN AN ORGANIZED HEALTH CARE EDUCATION/TRAINING PROGRAM

## 2021-06-10 PROCEDURE — 6370000000 HC RX 637 (ALT 250 FOR IP): Performed by: NURSE PRACTITIONER

## 2021-06-10 PROCEDURE — 76937 US GUIDE VASCULAR ACCESS: CPT

## 2021-06-10 PROCEDURE — 82947 ASSAY GLUCOSE BLOOD QUANT: CPT

## 2021-06-10 PROCEDURE — 6370000000 HC RX 637 (ALT 250 FOR IP): Performed by: EMERGENCY MEDICINE

## 2021-06-10 PROCEDURE — 1200000000 HC SEMI PRIVATE

## 2021-06-10 PROCEDURE — 6360000002 HC RX W HCPCS: Performed by: STUDENT IN AN ORGANIZED HEALTH CARE EDUCATION/TRAINING PROGRAM

## 2021-06-10 RX ORDER — INSULIN GLARGINE 100 [IU]/ML
20 INJECTION, SOLUTION SUBCUTANEOUS 2 TIMES DAILY
Status: DISCONTINUED | OUTPATIENT
Start: 2021-06-10 | End: 2021-06-13

## 2021-06-10 RX ORDER — SODIUM CHLORIDE, SODIUM LACTATE, POTASSIUM CHLORIDE, AND CALCIUM CHLORIDE .6; .31; .03; .02 G/100ML; G/100ML; G/100ML; G/100ML
1000 INJECTION, SOLUTION INTRAVENOUS ONCE
Status: COMPLETED | OUTPATIENT
Start: 2021-06-10 | End: 2021-06-10

## 2021-06-10 RX ORDER — DIPHENHYDRAMINE HCL 25 MG
25 TABLET ORAL EVERY 6 HOURS PRN
Status: DISCONTINUED | OUTPATIENT
Start: 2021-06-10 | End: 2021-06-13

## 2021-06-10 RX ORDER — INSULIN GLARGINE 100 [IU]/ML
8 INJECTION, SOLUTION SUBCUTANEOUS ONCE
Status: COMPLETED | OUTPATIENT
Start: 2021-06-10 | End: 2021-06-10

## 2021-06-10 RX ADMIN — SODIUM CHLORIDE, PRESERVATIVE FREE 10 ML: 5 INJECTION INTRAVENOUS at 10:36

## 2021-06-10 RX ADMIN — PANTOPRAZOLE SODIUM 40 MG: 40 TABLET, DELAYED RELEASE ORAL at 10:35

## 2021-06-10 RX ADMIN — HYDROCODONE BITARTRATE AND ACETAMINOPHEN 2 TABLET: 5; 325 TABLET ORAL at 20:33

## 2021-06-10 RX ADMIN — CYCLOBENZAPRINE 10 MG: 10 TABLET, FILM COATED ORAL at 15:51

## 2021-06-10 RX ADMIN — METFORMIN HYDROCHLORIDE 1000 MG: 500 TABLET ORAL at 17:38

## 2021-06-10 RX ADMIN — SODIUM CHLORIDE, POTASSIUM CHLORIDE, SODIUM LACTATE AND CALCIUM CHLORIDE 1000 ML: 600; 310; 30; 20 INJECTION, SOLUTION INTRAVENOUS at 15:52

## 2021-06-10 RX ADMIN — PANTOPRAZOLE SODIUM 40 MG: 40 TABLET, DELAYED RELEASE ORAL at 17:38

## 2021-06-10 RX ADMIN — INSULIN LISPRO 6 UNITS: 100 INJECTION, SOLUTION INTRAVENOUS; SUBCUTANEOUS at 17:38

## 2021-06-10 RX ADMIN — DIPHENHYDRAMINE HCL 25 MG: 25 TABLET ORAL at 20:44

## 2021-06-10 RX ADMIN — INSULIN LISPRO 3 UNITS: 100 INJECTION, SOLUTION INTRAVENOUS; SUBCUTANEOUS at 20:36

## 2021-06-10 RX ADMIN — ONDANSETRON 4 MG: 4 TABLET, ORALLY DISINTEGRATING ORAL at 10:35

## 2021-06-10 RX ADMIN — METFORMIN HYDROCHLORIDE 1000 MG: 500 TABLET ORAL at 12:08

## 2021-06-10 RX ADMIN — INSULIN GLARGINE 8 UNITS: 100 INJECTION, SOLUTION SUBCUTANEOUS at 13:03

## 2021-06-10 RX ADMIN — HYDROCODONE BITARTRATE AND ACETAMINOPHEN 2 TABLET: 5; 325 TABLET ORAL at 10:35

## 2021-06-10 RX ADMIN — CYCLOBENZAPRINE 10 MG: 10 TABLET, FILM COATED ORAL at 20:33

## 2021-06-10 RX ADMIN — HYDROCODONE BITARTRATE AND ACETAMINOPHEN 2 TABLET: 5; 325 TABLET ORAL at 01:58

## 2021-06-10 RX ADMIN — HYDROCODONE BITARTRATE AND ACETAMINOPHEN 2 TABLET: 5; 325 TABLET ORAL at 15:51

## 2021-06-10 RX ADMIN — CYCLOBENZAPRINE 10 MG: 10 TABLET, FILM COATED ORAL at 10:35

## 2021-06-10 RX ADMIN — GABAPENTIN 900 MG: 300 CAPSULE ORAL at 15:51

## 2021-06-10 RX ADMIN — INSULIN LISPRO 18 UNITS: 100 INJECTION, SOLUTION INTRAVENOUS; SUBCUTANEOUS at 07:30

## 2021-06-10 RX ADMIN — INSULIN LISPRO 18 UNITS: 100 INJECTION, SOLUTION INTRAVENOUS; SUBCUTANEOUS at 12:08

## 2021-06-10 RX ADMIN — INSULIN GLARGINE 20 UNITS: 100 INJECTION, SOLUTION SUBCUTANEOUS at 20:36

## 2021-06-10 RX ADMIN — GABAPENTIN 900 MG: 300 CAPSULE ORAL at 20:32

## 2021-06-10 RX ADMIN — GABAPENTIN 900 MG: 300 CAPSULE ORAL at 10:35

## 2021-06-10 RX ADMIN — SODIUM CHLORIDE: 9 INJECTION, SOLUTION INTRAVENOUS at 20:44

## 2021-06-10 RX ADMIN — TRAZODONE HYDROCHLORIDE 150 MG: 50 TABLET ORAL at 20:32

## 2021-06-10 RX ADMIN — INSULIN GLARGINE 12 UNITS: 100 INJECTION, SOLUTION SUBCUTANEOUS at 07:30

## 2021-06-10 RX ADMIN — CYCLOBENZAPRINE 10 MG: 10 TABLET, FILM COATED ORAL at 01:58

## 2021-06-10 ASSESSMENT — PAIN DESCRIPTION - PROGRESSION

## 2021-06-10 ASSESSMENT — PAIN DESCRIPTION - PAIN TYPE
TYPE: CHRONIC PAIN
TYPE: CHRONIC PAIN

## 2021-06-10 ASSESSMENT — PAIN DESCRIPTION - DESCRIPTORS
DESCRIPTORS: DISCOMFORT
DESCRIPTORS: DISCOMFORT

## 2021-06-10 ASSESSMENT — PAIN SCALES - GENERAL
PAINLEVEL_OUTOF10: 0
PAINLEVEL_OUTOF10: 10

## 2021-06-10 ASSESSMENT — PAIN DESCRIPTION - LOCATION
LOCATION: GENERALIZED
LOCATION: GENERALIZED

## 2021-06-10 NOTE — PROGRESS NOTES
Physical Therapy    Facility/Department: [address] MED SURG  Progress note    NAME: Haja Mohan  : 1965  MRN: [id number]    Date of Service: 6/10/2021    Discharge Recommendations:  Patient would benefit from continued therapy after discharge    Pt currently unsafe to return home    Assessment   Body structures, Functions, Activity limitations: Decreased functional mobility ; Decreased posture;Decreased endurance;Decreased ROM; Decreased strength;Decreased balance; Increased pain  Assessment: Pt Art bed mob, CGA transfers and amb w/ RW. Pt felt light headed and dizzy when standing for ~2 minutes and was sat EOB. Pt would benefit from continued skilled physical therapy to address endurance and functional mobility deficits to return pt to prior level of independence. Prognosis: Good  Decision Making: Medium Complexity  PT Education: Goals;PT Role;Plan of Care  REQUIRES PT FOLLOW UP: Yes  Activity Tolerance  Activity Tolerance: Patient limited by endurance; Patient limited by fatigue       Patient Diagnosis(es): The encounter diagnosis was Hyperglycemia.      has a past medical history of Acid reflux, Acute cystitis with hematuria, Acute non-recurrent maxillary sinusitis, Asthma, Bipolar 1 disorder ([address] Bipolar disorder, mixed ([address] BMI 34.0-34.9,adult, Cannabis use disorder, severe, dependence ([address] Cerebrovascular accident (CVA) ([address] Chest pain, Chronic renal insufficiency, Cocaine abuse ([address] DDD (degenerative disc disease), cervical, Diabetes mellitus ([address] Dizziness, Fibromyalgia, History of stroke, Homicidal ideation, Hyperglycemia, Hypertension, Hypotension, IDDM (insulin dependent diabetes mellitus), Lupus ([address] Migraine, Neuropathy, Neuropathy, Polysubstance abuse ([address] Post traumatic stress disorder (PTSD), Posttraumatic stress disorder, Recurrent depression ([address] Recurrent major depressive disorder, in partial remission ([address] Screening mammogram, encounter for, Severe recurrent major depression with psychotic features ([facility] Severe recurrent major depression without psychotic features ([facility] Stroke (cerebrum) ([facility] Stroke ([facility] Suicidal ideation, Suicidal intent, Vitamin D deficiency, and White matter changes. has a past surgical history that includes Abscess Drainage; chest tube insertion; Abdomen surgery; Cataract removal with implant (Bilateral); LASIK (Bilateral); amputation (Left, 03/13/2021); and Finger amputation (Left, 3/13/2021). Restrictions  Restrictions/Precautions  Restrictions/Precautions: Up as Tolerated, Fall Risk  Required Braces or Orthoses?: No  Vision/Hearing        Subjective  General  Patient assessed for rehabilitation services?: Yes  Response To Previous Treatment: Patient with no complaints from previous session. Family / Caregiver Present: No  Subjective  Subjective: RN and pt in agreement for PT; pt supine in bed upon PT arrival, reported generalized pain that was 10/10 but was singing and interacting normally with PT. Pt cooperative throughout except for at the end, pt became agitated when trying to get pt to lay back in bed after feeling lightheaded and dizzy.   Pain Screening  Patient Currently in Pain: Yes  Pain Assessment  Pain Assessment: 0-10  Pain Level: 10  Pain Type: Chronic pain  Pain Location: Generalized  Pain Descriptors: Discomfort  Vital Signs  Patient Currently in Pain: Yes  Pre Treatment Pain Screening  Intervention List: Patient able to continue with treatment    Orientation  Orientation  Overall Orientation Status: Within Functional Limits    Objective     Observation/Palpation  Posture: Good    Bed mobility  Rolling to Right: Modified independent  Supine to Sit: Contact guard assistance  Sit to Supine: Modified independent  Comment: HOB elevated with use of bed rails  Transfers  Sit to Stand: Contact guard assistance  Stand to sit: Contact guard assistance  Ambulation  Ambulation?: Yes  Ambulation 1  Surface: level tile  Device: Rolling Walker  Assistance: Contact guard assistance  Gait Deviations: Decreased step length;Shuffles; Slow Sonia  Distance: 25' x 2  Comments: Pt was feeling great and singing while walking, then stopped and started feeling lightheaded/dizzy. Pt was then sat on EOB to do sitting exercises. Stairs/Curb  Stairs?: No     Balance  Posture: Good  Sitting - Static: Good  Sitting - Dynamic: Fair  Standing - Static: Fair  Standing - Dynamic: Fair  Comments: Standing balance assessed w/ RW; pt able to sit EOB SBA  Other exercises  Other exercises?: Yes  Other exercises 1: Seated exercises: ankle pumps, LAQ, marches x10; hip abduction x5- pt stated \"i cannot do anymore\"     Plan   Plan  Times per week: 5-6x/week  Current Treatment Recommendations: Strengthening, Transfer Training, Endurance Training, Patient/Caregiver Education & Training, ROM, Equipment Evaluation, Education, & procurement, Balance Training, Gait Training, Home Exercise Program, Functional Mobility Training, Stair training, Safety Education & Training  Safety Devices  Type of devices: Left in bed, Call light within reach, Gait belt, Nurse notified, Patient at risk for falls  Restraints  Initially in place: No      Goals  Short term goals  Time Frame for Short term goals: 14  Short term goal 1: Pt to perform bed mobility and functional transfers independently  Short term goal 2: Ambulate 200ft w/ RW independently  Short term goal 3: Demonstrate standing dynamic balance of good - to decrease fall risk with standing tasks  Short term goal 4: Tolerate 30 minutes of therapy to demo increased endurance  Patient Goals   Patient goals : To go home       Therapy Time   Individual Concurrent Group Co-treatment   Time In 5776         Time Out 1521         Minutes 28         Timed Code Treatment Minutes: 24 Minutes   This treatment/evaluation completed by signing SPT. Signing PT agrees with treatment and documentation.            REINALDO Block PT

## 2021-06-10 NOTE — CONSULTS
Nutrition Education    · Verbally reviewed information with Patient  · Educated on Diabetic Diet. · Written educational materials provided. · Contact name and number provided. · Refer to Patient Education activity for more details.     Electronically signed by Dylan Keith RD, LD on 6/10/21 at 1:42 PM EDT    Contact: [phone]

## 2021-06-10 NOTE — CARE COORDINATION
Ashok Ortiz from [facility] that they are still waiting for insurance approval.  Ashok Ortiz was notified that [place] has up to 72 hours to make a decision.

## 2021-06-10 NOTE — PLAN OF CARE
Problem: Infection:  Goal: Will remain free from infection  Description: Will remain free from infection  Outcome: Ongoing     Problem: Safety:  Goal: Free from accidental physical injury  Description: Free from accidental physical injury  Outcome: Ongoing  Goal: Free from intentional harm  Description: Free from intentional harm  Outcome: Ongoing     Problem: Daily Care:  Goal: Daily care needs are met  Description: Daily care needs are met  Outcome: Ongoing     Problem: Pain:  Goal: Patient's pain/discomfort is manageable  Description: Patient's pain/discomfort is manageable  Outcome: Ongoing  Goal: Pain level will decrease  Description: Pain level will decrease  Outcome: Ongoing  Goal: Control of acute pain  Description: Control of acute pain  Outcome: Ongoing  Goal: Control of chronic pain  Description: Control of chronic pain  Outcome: Ongoing     Problem: Falls - Risk of:  Goal: Will remain free from falls  Description: Will remain free from falls  Outcome: Ongoing  Goal: Absence of physical injury  Description: Absence of physical injury  Outcome: Ongoing     Problem: Musculor/Skeletal Functional Status  Goal: Highest potential functional level  Outcome: Ongoing  Goal: Absence of falls  Outcome: Ongoing     Problem: Skin Integrity:  Goal: Will show no infection signs and symptoms  Description: Will show no infection signs and symptoms  Outcome: Ongoing  Goal: Absence of new skin breakdown  Description: Absence of new skin breakdown  Outcome: Ongoing

## 2021-06-10 NOTE — PROGRESS NOTES
[address]  CDU / OBSERVATION ENCOUNTER  ATTENDING NOTE       I performed a history and physical examination of the patient and discussed management with the resident or midlevel provider. I reviewed the resident or midlevel provider's note and agree with the documented findings and plan of care. Any areas of disagreement are noted on the chart. I was personally present for the key portions of any procedures. I have documented in the chart those procedures where I was not present during the key portions. I have reviewed the nurses notes. I agree with the chief complaint, past medical history, past surgical history, allergies, medications, social and family history as documented unless otherwise noted below. The Family history, social history, and ROS are effectively unchanged since admission unless noted elsewhere in the chart. Still having difficulty with glucose control. Patient did not have good limitation of diet yesterday with juice being found in her room. Patient will have closer monitoring of dietary intake. Patient for more aggressive insulin dosing. Will reassess throughout the day. Anticipate better control this afternoon. May need to consider more aggressive therapy. Diet now better controlled in this patient. Patient has had sugary snacks and drinks removed from room. Patient has had IV hydration. Watching sugars today. Increase in Lantus. Covering with higher scale insulin dosing. Metformin added back in. Patient for ongoing glucose monitoring and may need to continue to increase insulin dosing.     Livier Mary MD  Attending Emergency  Physician

## 2021-06-11 LAB
GLUCOSE BLD-MCNC: 187 MG/DL (ref 65–105)
GLUCOSE BLD-MCNC: 219 MG/DL (ref 65–105)
GLUCOSE BLD-MCNC: 227 MG/DL (ref 65–105)
GLUCOSE BLD-MCNC: 319 MG/DL (ref 65–105)
GLUCOSE BLD-MCNC: 337 MG/DL (ref 65–105)

## 2021-06-11 PROCEDURE — 97530 THERAPEUTIC ACTIVITIES: CPT

## 2021-06-11 PROCEDURE — 97116 GAIT TRAINING THERAPY: CPT

## 2021-06-11 PROCEDURE — 6370000000 HC RX 637 (ALT 250 FOR IP): Performed by: STUDENT IN AN ORGANIZED HEALTH CARE EDUCATION/TRAINING PROGRAM

## 2021-06-11 PROCEDURE — 82947 ASSAY GLUCOSE BLOOD QUANT: CPT

## 2021-06-11 PROCEDURE — 6370000000 HC RX 637 (ALT 250 FOR IP): Performed by: EMERGENCY MEDICINE

## 2021-06-11 PROCEDURE — 6360000002 HC RX W HCPCS: Performed by: STUDENT IN AN ORGANIZED HEALTH CARE EDUCATION/TRAINING PROGRAM

## 2021-06-11 PROCEDURE — 6370000000 HC RX 637 (ALT 250 FOR IP): Performed by: NURSE PRACTITIONER

## 2021-06-11 PROCEDURE — 2580000003 HC RX 258: Performed by: STUDENT IN AN ORGANIZED HEALTH CARE EDUCATION/TRAINING PROGRAM

## 2021-06-11 PROCEDURE — 1200000000 HC SEMI PRIVATE

## 2021-06-11 RX ORDER — INSULIN GLARGINE 100 [IU]/ML
20 INJECTION, SOLUTION SUBCUTANEOUS 2 TIMES DAILY
Qty: 5 PEN | Refills: 5 | Status: SHIPPED | OUTPATIENT
Start: 2021-06-11 | End: 2021-06-14 | Stop reason: SDUPTHER

## 2021-06-11 RX ORDER — CYCLOBENZAPRINE HCL 10 MG
10 TABLET ORAL 3 TIMES DAILY PRN
Qty: 10 TABLET | Refills: 0 | Status: SHIPPED | OUTPATIENT
Start: 2021-06-11 | End: 2021-08-09 | Stop reason: ALTCHOICE

## 2021-06-11 RX ADMIN — DIPHENHYDRAMINE HCL 25 MG: 25 TABLET ORAL at 15:15

## 2021-06-11 RX ADMIN — GABAPENTIN 900 MG: 300 CAPSULE ORAL at 23:02

## 2021-06-11 RX ADMIN — CYCLOBENZAPRINE 10 MG: 10 TABLET, FILM COATED ORAL at 05:04

## 2021-06-11 RX ADMIN — INSULIN LISPRO 6 UNITS: 100 INJECTION, SOLUTION INTRAVENOUS; SUBCUTANEOUS at 12:27

## 2021-06-11 RX ADMIN — ONDANSETRON 4 MG: 4 TABLET, ORALLY DISINTEGRATING ORAL at 15:15

## 2021-06-11 RX ADMIN — HYDROCODONE BITARTRATE AND ACETAMINOPHEN 2 TABLET: 5; 325 TABLET ORAL at 09:17

## 2021-06-11 RX ADMIN — INSULIN GLARGINE 20 UNITS: 100 INJECTION, SOLUTION SUBCUTANEOUS at 23:03

## 2021-06-11 RX ADMIN — INSULIN LISPRO 3 UNITS: 100 INJECTION, SOLUTION INTRAVENOUS; SUBCUTANEOUS at 17:04

## 2021-06-11 RX ADMIN — HYDROCODONE BITARTRATE AND ACETAMINOPHEN 2 TABLET: 5; 325 TABLET ORAL at 15:15

## 2021-06-11 RX ADMIN — CYCLOBENZAPRINE 10 MG: 10 TABLET, FILM COATED ORAL at 15:15

## 2021-06-11 RX ADMIN — METFORMIN HYDROCHLORIDE 1000 MG: 500 TABLET ORAL at 17:05

## 2021-06-11 RX ADMIN — ONDANSETRON 4 MG: 2 INJECTION INTRAMUSCULAR; INTRAVENOUS at 17:05

## 2021-06-11 RX ADMIN — SODIUM CHLORIDE, PRESERVATIVE FREE 10 ML: 5 INJECTION INTRAVENOUS at 09:25

## 2021-06-11 RX ADMIN — GABAPENTIN 900 MG: 300 CAPSULE ORAL at 09:19

## 2021-06-11 RX ADMIN — CYCLOBENZAPRINE 10 MG: 10 TABLET, FILM COATED ORAL at 23:02

## 2021-06-11 RX ADMIN — DIPHENHYDRAMINE HCL 25 MG: 25 TABLET ORAL at 09:17

## 2021-06-11 RX ADMIN — PANTOPRAZOLE SODIUM 40 MG: 40 TABLET, DELAYED RELEASE ORAL at 09:19

## 2021-06-11 RX ADMIN — METFORMIN HYDROCHLORIDE 1000 MG: 500 TABLET ORAL at 09:19

## 2021-06-11 RX ADMIN — PANTOPRAZOLE SODIUM 40 MG: 40 TABLET, DELAYED RELEASE ORAL at 17:05

## 2021-06-11 RX ADMIN — TRAZODONE HYDROCHLORIDE 150 MG: 50 TABLET ORAL at 23:02

## 2021-06-11 RX ADMIN — SODIUM CHLORIDE: 9 INJECTION, SOLUTION INTRAVENOUS at 12:29

## 2021-06-11 RX ADMIN — HYDROCODONE BITARTRATE AND ACETAMINOPHEN 2 TABLET: 5; 325 TABLET ORAL at 05:04

## 2021-06-11 RX ADMIN — HYDROCODONE BITARTRATE AND ACETAMINOPHEN 2 TABLET: 5; 325 TABLET ORAL at 23:02

## 2021-06-11 RX ADMIN — INSULIN LISPRO 3 UNITS: 100 INJECTION, SOLUTION INTRAVENOUS; SUBCUTANEOUS at 23:02

## 2021-06-11 RX ADMIN — INSULIN GLARGINE 20 UNITS: 100 INJECTION, SOLUTION SUBCUTANEOUS at 09:18

## 2021-06-11 RX ADMIN — INSULIN LISPRO 12 UNITS: 100 INJECTION, SOLUTION INTRAVENOUS; SUBCUTANEOUS at 09:20

## 2021-06-11 RX ADMIN — SODIUM CHLORIDE: 9 INJECTION, SOLUTION INTRAVENOUS at 04:32

## 2021-06-11 RX ADMIN — GABAPENTIN 900 MG: 300 CAPSULE ORAL at 15:15

## 2021-06-11 ASSESSMENT — PAIN SCALES - GENERAL
PAINLEVEL_OUTOF10: 0
PAINLEVEL_OUTOF10: 0
PAINLEVEL_OUTOF10: 8
PAINLEVEL_OUTOF10: 9
PAINLEVEL_OUTOF10: 10

## 2021-06-11 ASSESSMENT — PAIN DESCRIPTION - PAIN TYPE: TYPE: CHRONIC PAIN

## 2021-06-11 ASSESSMENT — PAIN DESCRIPTION - LOCATION
LOCATION: BACK;LEG
LOCATION: BACK

## 2021-06-11 ASSESSMENT — PAIN DESCRIPTION - FREQUENCY: FREQUENCY: CONTINUOUS

## 2021-06-11 ASSESSMENT — PAIN DESCRIPTION - DESCRIPTORS: DESCRIPTORS: ACHING

## 2021-06-11 ASSESSMENT — PAIN DESCRIPTION - PROGRESSION: CLINICAL_PROGRESSION: NOT CHANGED

## 2021-06-11 ASSESSMENT — PAIN DESCRIPTION - ORIENTATION
ORIENTATION: LOWER
ORIENTATION: LOWER

## 2021-06-11 ASSESSMENT — PAIN - FUNCTIONAL ASSESSMENT: PAIN_FUNCTIONAL_ASSESSMENT: PREVENTS OR INTERFERES SOME ACTIVE ACTIVITIES AND ADLS

## 2021-06-11 ASSESSMENT — PAIN DESCRIPTION - ONSET: ONSET: ON-GOING

## 2021-06-11 NOTE — PROGRESS NOTES
[address] [facility]  CDU / OBSERVATION ENCOUNTER  ATTENDING NOTE       I performed a history and physical examination of the patient and discussed management with the resident. I reviewed the residents note and agree with the documented findings and plan of care. Any areas of disagreement are noted on the chart. I was personally present for the key portions of any procedures. I have documented in the chart those procedures where I was not present during the key portions. I have reviewed the nurses notes. I agree with the chief complaint, past medical history, past surgical history, allergies, medications, social and family history as documented unless otherwise noted below. The Family history, social history, and ROS are effectively unchanged since admission unless noted elsewhere in the chart. The patient has a 53-year-old female with history of uncontrolled diabetes who presents for evaluation after she fell at home and was on the floor for 2 days. She was found to be hyperglycemic. She has continued to be noncompliant with her diabetic diet. The patient is currently awaiting placement at a SNF.     Rebecca Antonio DO  Attending Emergency Physician

## 2021-06-11 NOTE — CARE COORDINATION
Writer called to [facility] to check status of precert,  Writer is to receive a call back with this information. Ivana called Alcira Lopez at [facility] to check on precert. The regular admission person is off and they are trying to check on the status of this precert.       [phone] from [facility] [facility] [employer] notified writer that precert is not available and they are waiting on precerts from this insurance that were initiated on Monday

## 2021-06-11 NOTE — PROGRESS NOTES
OBS/CDU   RESIDENT NOTE      Patients PCP is:  Tessie Mccrary MD        SUBJECTIVE      Patient evaluated at bedside this morning. She denies any complaints at this time although states she still gets nauseous and feels weak when walking to the bathroom. Otherwise oral intake has been good, no vomiting, no abdominal pain, no headache, vision changes, fevers, chills, focal weakness or numbness, or other complaints. Currently awaiting placement in SNF. PHYSICAL EXAM      General: NAD, AO X 3  Heent: EMOI, PERRL  Neck: SUPPLE, NO JVD  Cardiovascular: RRR, S1S2  Pulmonary: CTAB, NO SOB  Abdomen: SOFT, NTTP, ND, +BS  Extremities: +2/4 PULSES DISTAL, NO SWELLING  Neuro / Psych: NO NUMBNESS OR TINGLING, MENTATION AT BASELINE    PERTINENT TEST /EXAMS      I have reviewed all available laboratory results.     MEDICATIONS CURRENT   insulin glargine (LANTUS) injection vial 20 Units, BID  metFORMIN (GLUCOPHAGE) tablet 1,000 mg, BID WC  diphenhydrAMINE (BENADRYL) tablet 25 mg, Q6H PRN  cyclobenzaprine (FLEXERIL) tablet 10 mg, TID PRN  gabapentin (NEURONTIN) capsule 900 mg, TID  glucose (GLUTOSE) 40 % oral gel 15 g, PRN  dextrose 50 % IV solution, PRN  glucagon (rDNA) injection 1 mg, PRN  dextrose 5 % solution, PRN  traZODone (DESYREL) tablet 150 mg, Nightly  pantoprazole (PROTONIX) tablet 40 mg, BID AC  sodium chloride flush 0.9 % injection 5-40 mL, 2 times per day  sodium chloride flush 0.9 % injection 5-40 mL, PRN  0.9 % sodium chloride infusion, PRN  enoxaparin (LOVENOX) injection 40 mg, Daily  acetaminophen (TYLENOL) tablet 650 mg, Q4H PRN  ondansetron (ZOFRAN-ODT) disintegrating tablet 4 mg, Q8H PRN   Or  ondansetron (ZOFRAN) injection 4 mg, Q6H PRN  0.9 % sodium chloride infusion, Continuous  HYDROcodone-acetaminophen (NORCO) 5-325 MG per tablet 1 tablet, Q4H PRN   Or  HYDROcodone-acetaminophen (NORCO) 5-325 MG per tablet 2 tablet, Q4H PRN  insulin lispro (HUMALOG) injection vial 0-18 Units, TID   insulin lispro (HUMALOG) injection vial 0-9 Units, Nightly        All medication charted and reviewed. CONSULTS      IP CONSULT TO IV TEAM  IP CONSULT TO HOME CARE NEEDS  IP CONSULT TO SOCIAL WORK  IP CONSULT TO IV TEAM  IP CONSULT TO DIETITIAN  IP CONSULT TO IV TEAM    ASSESSMENT/PLAN        Terri Palmer is a 49 y. o. female who presents after a fall at home where patient states she lost her balance and landed on her back and was there for approximately 2 days.  Upon arrival patient was found to have a blood sugar in the 700s and states she was unable to take her medication as she lives alone and had no assistance. [facility] clinical presentation will admit for further observation, evaluation and symptom management     1. Fall at home  2. Hyperglycemia  · PT/OT eval completed  · Symptom management  · Blood sugar control - Add lantus  · Lantus increased to 20 units twice daily  · Coordinate with SW/CM for dispo; patient prefers not to got to SNF and to have home health aide  · Currently awaiting response from [facility]  · Continue home medications and pain control  · Monitor vitals, labs, and imaging  · DISPO: pending consults and clinical improvement  --  Carmen Yoder, DO  Emergency Medicine Resident Physician     This dictation was generated by voice recognition computer software. Although all attempts are made to edit the dictation for accuracy, there may be errors in the transcription that are not intended.

## 2021-06-11 NOTE — PROGRESS NOTES
Physical Therapy  Facility/Department: [address] MED SURG  Daily Treatment Note  NAME: Bentley Viramontes  : 1965  MRN: [id number]    Date of Service: 2021    Discharge Recommendations:  Patient would benefit from continued therapy after discharge   PT Equipment Recommendations  Equipment Needed: Yes  Mobility Devices: Wanetta Monserrate: Rolling    Assessment   Body structures, Functions, Activity limitations: Decreased functional mobility ; Decreased posture;Decreased endurance;Decreased ROM; Decreased strength;Decreased balance; Increased pain  Assessment: Pt able to complete bed mobility without assistance, CGA transfers and amb w/ RW ~50ft. Pt felt light headed and dizzy when standing in restroom. Pt would benefit from continued skilled physical therapy to address endurance and functional mobility deficits to return pt to prior level of independence. Prognosis: Good  PT Education: Goals;PT Role;General Safety;Gait Training;Functional Mobility Training;Transfer Training  REQUIRES PT FOLLOW UP: Yes  Activity Tolerance  Activity Tolerance: Patient limited by endurance;Treatment limited secondary to medical complications (free text)  Activity Tolerance: dizzy     Patient Diagnosis(es): The primary encounter diagnosis was Hyperglycemia. A diagnosis of Type 2 diabetes mellitus without complication, without long-term current use of insulin (HCC) was also pertinent to this visit.      has a past medical history of Acid reflux, Acute cystitis with hematuria, Acute non-recurrent maxillary sinusitis, Asthma, Bipolar 1 disorder ([address] Bipolar disorder, mixed ([address] BMI 34.0-34.9,adult, Cannabis use disorder, severe, dependence ([address] Cerebrovascular accident (CVA) ([address] Chest pain, Chronic renal insufficiency, Cocaine abuse ([address] DDD (degenerative disc disease), cervical, Diabetes mellitus ([address] Dizziness, Fibromyalgia, History of stroke, Homicidal ideation, Hyperglycemia, Hypertension, Hypotension, IDDM (insulin dependent diabetes mellitus), Lupus ([facility] Migraine, Neuropathy, Neuropathy, Polysubstance abuse ([facility] Post traumatic stress disorder (PTSD), Posttraumatic stress disorder, Recurrent depression ([facility] Recurrent major depressive disorder, in partial remission ([facility] Screening mammogram, encounter for, Severe recurrent major depression with psychotic features ([facility] Severe recurrent major depression without psychotic features ([facility] [address] Stroke (cerebrum) ([facility] Stroke ([facility] Suicidal ideation, Suicidal intent, Vitamin D deficiency, and White matter changes. has a past surgical history that includes Abscess Drainage; chest tube insertion; Abdomen surgery; Cataract removal with implant (Bilateral); LASIK (Bilateral); amputation (Left, 03/13/2021); and Finger amputation (Left, 3/13/2021). Restrictions  Restrictions/Precautions  Restrictions/Precautions: Up as Tolerated, Fall Risk  Required Braces or Orthoses?: No  Subjective   General  Response To Previous Treatment: Patient with no complaints from previous session. Family / Caregiver Present: No  Subjective  Subjective: Pt and RN agreeable to PT. Pt alert in bed upon arrival, eager to ambulate.   General Comment  Comments: Pt left in bed with call light within reach  Pain Screening  Patient Currently in Pain: Yes  Pain Assessment  Pain Assessment: 0-10  Pain Level: 8  Pain Type: Chronic pain  Pain Location: Back  Pain Orientation: Lower  Pain Descriptors: Aching  Pain Frequency: Continuous  Pain Onset: On-going  Clinical Progression: Not changed  Functional Pain Assessment: Prevents or interferes some active activities and ADLs  Non-Pharmaceutical Pain Intervention(s): Ambulation/Increased Activity;Repositioned  Vital Signs  Patient Currently in Pain: Yes       Orientation  Orientation  Overall Orientation Status: Within Functional Limits  Cognition      Objective   Bed mobility  Supine to Sit: Stand by assistance  Sit to Supine: Stand by assistance  Scooting: Stand

## 2021-06-12 LAB
GLUCOSE BLD-MCNC: 130 MG/DL (ref 65–105)
GLUCOSE BLD-MCNC: 164 MG/DL (ref 65–105)
GLUCOSE BLD-MCNC: 170 MG/DL (ref 65–105)
GLUCOSE BLD-MCNC: 220 MG/DL (ref 65–105)

## 2021-06-12 PROCEDURE — 82947 ASSAY GLUCOSE BLOOD QUANT: CPT

## 2021-06-12 PROCEDURE — 94640 AIRWAY INHALATION TREATMENT: CPT

## 2021-06-12 PROCEDURE — 2580000003 HC RX 258: Performed by: STUDENT IN AN ORGANIZED HEALTH CARE EDUCATION/TRAINING PROGRAM

## 2021-06-12 PROCEDURE — 6370000000 HC RX 637 (ALT 250 FOR IP): Performed by: STUDENT IN AN ORGANIZED HEALTH CARE EDUCATION/TRAINING PROGRAM

## 2021-06-12 PROCEDURE — 1200000000 HC SEMI PRIVATE

## 2021-06-12 PROCEDURE — 6370000000 HC RX 637 (ALT 250 FOR IP): Performed by: EMERGENCY MEDICINE

## 2021-06-12 RX ORDER — ALBUTEROL SULFATE 90 UG/1
2 AEROSOL, METERED RESPIRATORY (INHALATION) EVERY 4 HOURS PRN
Status: DISCONTINUED | OUTPATIENT
Start: 2021-06-12 | End: 2021-06-14 | Stop reason: HOSPADM

## 2021-06-12 RX ADMIN — INSULIN LISPRO 6 UNITS: 100 INJECTION, SOLUTION INTRAVENOUS; SUBCUTANEOUS at 11:55

## 2021-06-12 RX ADMIN — TRAZODONE HYDROCHLORIDE 150 MG: 50 TABLET ORAL at 21:23

## 2021-06-12 RX ADMIN — INSULIN GLARGINE 20 UNITS: 100 INJECTION, SOLUTION SUBCUTANEOUS at 21:23

## 2021-06-12 RX ADMIN — INSULIN GLARGINE 20 UNITS: 100 INJECTION, SOLUTION SUBCUTANEOUS at 08:30

## 2021-06-12 RX ADMIN — PANTOPRAZOLE SODIUM 40 MG: 40 TABLET, DELAYED RELEASE ORAL at 08:28

## 2021-06-12 RX ADMIN — GABAPENTIN 900 MG: 300 CAPSULE ORAL at 08:29

## 2021-06-12 RX ADMIN — HYDROCODONE BITARTRATE AND ACETAMINOPHEN 2 TABLET: 5; 325 TABLET ORAL at 21:23

## 2021-06-12 RX ADMIN — CYCLOBENZAPRINE 10 MG: 10 TABLET, FILM COATED ORAL at 08:29

## 2021-06-12 RX ADMIN — SODIUM CHLORIDE: 9 INJECTION, SOLUTION INTRAVENOUS at 05:24

## 2021-06-12 RX ADMIN — SODIUM CHLORIDE: 9 INJECTION, SOLUTION INTRAVENOUS at 21:30

## 2021-06-12 RX ADMIN — METFORMIN HYDROCHLORIDE 1000 MG: 500 TABLET ORAL at 08:28

## 2021-06-12 RX ADMIN — HYDROCODONE BITARTRATE AND ACETAMINOPHEN 2 TABLET: 5; 325 TABLET ORAL at 16:57

## 2021-06-12 RX ADMIN — INSULIN LISPRO 2 UNITS: 100 INJECTION, SOLUTION INTRAVENOUS; SUBCUTANEOUS at 21:24

## 2021-06-12 RX ADMIN — INSULIN LISPRO 3 UNITS: 100 INJECTION, SOLUTION INTRAVENOUS; SUBCUTANEOUS at 16:58

## 2021-06-12 RX ADMIN — METFORMIN HYDROCHLORIDE 1000 MG: 500 TABLET ORAL at 18:12

## 2021-06-12 RX ADMIN — ONDANSETRON 4 MG: 4 TABLET, ORALLY DISINTEGRATING ORAL at 08:29

## 2021-06-12 RX ADMIN — CYCLOBENZAPRINE 10 MG: 10 TABLET, FILM COATED ORAL at 16:57

## 2021-06-12 RX ADMIN — SODIUM CHLORIDE: 9 INJECTION, SOLUTION INTRAVENOUS at 14:12

## 2021-06-12 RX ADMIN — HYDROCODONE BITARTRATE AND ACETAMINOPHEN 2 TABLET: 5; 325 TABLET ORAL at 08:28

## 2021-06-12 RX ADMIN — GABAPENTIN 900 MG: 300 CAPSULE ORAL at 21:23

## 2021-06-12 RX ADMIN — PANTOPRAZOLE SODIUM 40 MG: 40 TABLET, DELAYED RELEASE ORAL at 17:01

## 2021-06-12 RX ADMIN — GABAPENTIN 900 MG: 300 CAPSULE ORAL at 14:17

## 2021-06-12 RX ADMIN — ALBUTEROL SULFATE 2 PUFF: 90 AEROSOL, METERED RESPIRATORY (INHALATION) at 16:54

## 2021-06-12 ASSESSMENT — PAIN SCALES - GENERAL
PAINLEVEL_OUTOF10: 8
PAINLEVEL_OUTOF10: 10
PAINLEVEL_OUTOF10: 7

## 2021-06-12 NOTE — PROGRESS NOTES
OBS/CDU   RESIDENT NOTE      Patients PCP is:  Xu Monteiro MD        SUBJECTIVE      Patient evaluated at bedside this morning. She denies any current complaints but continues to feel off balance when up and ambulating to the bathroom, in addition to feeling nauseous. After more discussion about this, she believes this may be due to anxiety and fear of falling again. Currently awaiting placement in SNF. [facility] has accepted but awaiting pre-cert. PHYSICAL EXAM      General: NAD, AO X 3  Heent: EMOI, PERRL  Neck: SUPPLE, NO JVD  Cardiovascular: RRR, S1S2  Pulmonary: CTAB, NO SOB  Abdomen: SOFT, NTTP, ND, +BS  Extremities: +2/4 PULSES DISTAL, NO SWELLING  Neuro / Psych: NO NUMBNESS OR TINGLING, MENTATION AT BASELINE    PERTINENT TEST /EXAMS      I have reviewed all available laboratory results.     MEDICATIONS CURRENT   insulin glargine (LANTUS) injection vial 20 Units, BID  metFORMIN (GLUCOPHAGE) tablet 1,000 mg, BID WC  diphenhydrAMINE (BENADRYL) tablet 25 mg, Q6H PRN  cyclobenzaprine (FLEXERIL) tablet 10 mg, TID PRN  gabapentin (NEURONTIN) capsule 900 mg, TID  glucose (GLUTOSE) 40 % oral gel 15 g, PRN  dextrose 50 % IV solution, PRN  glucagon (rDNA) injection 1 mg, PRN  dextrose 5 % solution, PRN  traZODone (DESYREL) tablet 150 mg, Nightly  pantoprazole (PROTONIX) tablet 40 mg, BID AC  sodium chloride flush 0.9 % injection 5-40 mL, 2 times per day  sodium chloride flush 0.9 % injection 5-40 mL, PRN  0.9 % sodium chloride infusion, PRN  enoxaparin (LOVENOX) injection 40 mg, Daily  acetaminophen (TYLENOL) tablet 650 mg, Q4H PRN  ondansetron (ZOFRAN-ODT) disintegrating tablet 4 mg, Q8H PRN   Or  ondansetron (ZOFRAN) injection 4 mg, Q6H PRN  0.9 % sodium chloride infusion, Continuous  HYDROcodone-acetaminophen (NORCO) 5-325 MG per tablet 1 tablet, Q4H PRN   Or  HYDROcodone-acetaminophen (NORCO) 5-325 MG per tablet 2 tablet, Q4H PRN  insulin lispro (HUMALOG) injection vial 0-18 Units, TID   insulin lispro (HUMALOG) injection vial 0-9 Units, Nightly        All medication charted and reviewed. CONSULTS      IP CONSULT TO IV TEAM  IP CONSULT TO HOME CARE NEEDS  IP CONSULT TO SOCIAL WORK  IP CONSULT TO IV TEAM  IP CONSULT TO DIETITIAN  IP CONSULT TO IV TEAM    ASSESSMENT/PLAN        Terri Palmer is a 49 y. o. female who presents after a fall at home where patient states she lost her balance and landed on her back and was there for approximately 2 days.  Upon arrival patient was found to have a blood sugar in the 700s and states she was unable to take her medication as she lives alone and had no assistance. [facility] clinical presentation will admit for further observation, evaluation and symptom management     1. Fall at home  2. Hyperglycemia  · PT/OT eval completed  · Symptom management  · Blood sugar control - Add lantus  · Lantus increased to 20 units twice daily  · Coordinate with SW/CM for dispo; patient prefers not to got to SNF and to have home health aide  · Accepted at [address]  · Awaiting pre-cert    · Continue home medications and pain control  · Monitor vitals, labs, and imaging  · DISPO: pending consults and clinical improvement  --  Nayla Guidry DO  Emergency Medicine Resident Physician     This dictation was generated by voice recognition computer software. Although all attempts are made to edit the dictation for accuracy, there may be errors in the transcription that are not intended.

## 2021-06-12 NOTE — DISCHARGE SUMMARY
CDU Discharge Summary        Patient:  Joe Lopez  YOB: 1965    MRN: [id number]   Acct: [de-identified]    Primary Care Physician: Klever Borges MD    Admit date:  6/7/2021  2:28 PM  Transfer date: No discharge date for patient encounter. Transferred to [facility] medicine on 6/12/2021    Discharge Diagnoses/Diagnoses at time of Transfer:    Acute hyperglycemia due to noncompliance with medication, diet  Improved with medication adjustment, diet    Acute diffuse muscle pains due to fall  Improved with rest, analgesics    Follow-up:  Patient is being transferred to another service. Outpatient follow-up will be arranged by discharging service.     Discharge Medications:  Changes to medications made prior to transfer         Gavi Counts   Home Medication Instructions KNO:[id number]    Printed on:06/12/21 1810   Medication Information                      acetaminophen (TYLENOL) 325 MG tablet  TAKE 2 TABLETS BY MOUTH EVERY 6 HOURS AS NEEDED FOR PAIN             albuterol sulfate  (90 Base) MCG/ACT inhaler  Inhale 2 puffs into the lungs every 4 hours as needed for Wheezing             Alcohol Swabs (ALCOHOL PREP) 70 % PADS  1 each by Does not apply route as needed (use as needed) Use_____times per day  Diagnosis: 250.0   Diabetes ___Insulin-Dependent___Non-Insulin Dependent             benzonatate (TESSALON) 200 MG capsule               budesonide-formoterol (SYMBICORT) 80-4.5 MCG/ACT AERO  Inhale 2 puffs into the lungs 2 times daily             celecoxib (CELEBREX) 200 MG capsule               cetirizine (ZYRTEC) 10 MG tablet  Take 1 tablet by mouth daily             cyclobenzaprine (FLEXERIL) 10 MG tablet  Take 1 tablet by mouth 3 times daily as needed for Muscle spasms             dicyclomine (BENTYL) 20 MG tablet  Take 1 tablet by mouth 3 times daily as needed (for abdominal discomfort)              MG capsule  TAKE 1 CAPSULE BY MOUTH TWICE DAILY             FLOVENT  MCG/ACT inhaler  Inhale 2 puffs into the lungs 2 times daily             FreeStyle Lancets MISC  1 each by Does not apply route 3 times daily             FREESTYLE LITE strip  1 each by Does not apply route 3 times daily             gabapentin (NEURONTIN) 300 MG capsule  TAKE 3 CAPSULES 900 MG BY MOUTH THREE TIMES DAILY             insulin glargine (LANTUS SOLOSTAR) 100 UNIT/ML injection pen  Inject 20 Units into the skin 2 times daily             Insulin Pen Needle ([employer] PEN NEEDLES 31G) 31G X 8 MM MISC  1 each by Does not apply route daily             melatonin (RA MELATONIN) 3 MG TABS tablet  Take 1 tablet by mouth daily             metFORMIN (GLUCOPHAGE) 1000 MG tablet  TAKE 1 TABLET BY MOUTH DAILY WITH BREAKFAST             Misc. Devices MISC  1 pair of dm shoes, 3 accommodated inserts             omeprazole (PRILOSEC) 20 MG delayed release capsule  Take 1 capsule by mouth Daily             Povidone-Iodine Scrub Sponge (BETADINE SWAB AID) 10 % SWAB  Apply 1 Stick topically 2 times daily             traZODone (DESYREL) 150 MG tablet  Take 1 tablet by mouth nightly             venlafaxine (EFFEXOR XR) 150 MG extended release capsule  Take 1 capsule by mouth daily (with breakfast)                 Diet:  ADULT DIET; Regular; 3 carb choices (45 gm/meal) , Advance as tolerated     Activity:  As tolerated    Consultants: IP CONSULT TO IV TEAM  IP CONSULT TO HOME CARE NEEDS  IP CONSULT TO SOCIAL WORK  IP CONSULT TO IV TEAM  IP CONSULT TO DIETITIAN  IP CONSULT TO IV TEAM  IP CONSULT TO FAMILY MEDICINE    Procedures:  Not indicated     Diagnostic Test:     No results found. Physical Exam:    General appearance - NAD, AOx 3  Lungs -CTA Bilat  Heart - RRR no murmurs  Abdomen - Soft NT/ND  Neurological:  No focal motor deficit, sensory loss  Extremities - Cap refil <2 sec in all ext. Skin -warm, dry      Hospital Course:  Clinical course has improved, labs and imaging reviewed.      Magdalene khan presented to the hospital on 6/7/2021  2:28 PM. with diffuse myalgias and arthralgias after falling and remaining on her floor for 2 days without help. Patient was also found to be very hyperglycemic without signs of DKA. ED work-up was negative for rhabdomyolysis or other significant findings which is acute injury. She was admitted for observation and reevaluation, in addition to hyperglycemia management and further coordination of home care needs. Labs and imaging were followed daily. Imaging results as above. Due to prolonged stay and need for SNF placement, in addition to hyperglycemia,  She requires a higher level of care. Patient follows with [facility] family medicine and was transferred to their service. Disposition: Transfer    Patient is transferred to family medicine's service.   Accepting physician is Dr. Maddie Barrientos    Condition: Stable    Time Spent: 3 day    --  Radha Ojeda DO   Emergency Medicine Resident Physician, PGY-1

## 2021-06-12 NOTE — PROGRESS NOTES
Transfer of care to [facility] Medicine Service. Received sign-out from the Obs resident. Patient is 49year old female with PMHx of DM2 who initally presented to the ER on 6/7/21 after patient fell down at home due to loss of balance. Patient stated that she did not have anything to grab onto and had difficulty getting up and was down for sometime. Patient lives alone. Stated that she has been feeling weak for some time now. Patient stated that her  passed away in March and has been living alone since. Patient has been stressed and is having a difficult time caring for herself. When patient presented to the ER, she was hyperglycemic in the 700's. Patient had not taken her medications for a few days. Patient was admitted to the obs unit for glycemic control and further observation for her weakness. Patient's hyperglycemia has been improving. Patient is agreeable for SNF placement. Patient being transferred to medicine service due to requiring a longer stay due to SNF placement and continuing to optimize glycemic control. Objective:  Physical Exam  Constitutional:       Appearance: She is obese. HENT:      Head: Normocephalic and atraumatic. Cardiovascular:      Rate and Rhythm: Normal rate and regular rhythm. Pulses: Normal pulses. Heart sounds: Normal heart sounds. Pulmonary:      Effort: Pulmonary effort is normal. No respiratory distress. Breath sounds: Normal breath sounds. No wheezing. Abdominal:      Palpations: Abdomen is soft. Tenderness: There is no abdominal tenderness. There is no guarding. Skin:     General: Skin is warm and dry. Neurological:      General: No focal deficit present. Mental Status: She is alert and oriented to person, place, and time.        Plan:    DM2  - Last A1c 10  - currently on lantus 20U BID  - high dose ISS  - metformin 1000 mg PO BID  - POCT glucose  - hypoglycemia protocol    DVT ppx: lovenox  GI ppx: Protonix 40 mg PO BID    Dispo: plan for discharge to SNF. Awaiting precert.

## 2021-06-13 LAB
ANION GAP SERPL CALCULATED.3IONS-SCNC: 9 MMOL/L (ref 9–17)
BUN BLDV-MCNC: 6 MG/DL (ref 6–20)
BUN/CREAT BLD: ABNORMAL (ref 9–20)
CALCIUM SERPL-MCNC: 8.1 MG/DL (ref 8.6–10.4)
CHLORIDE BLD-SCNC: 111 MMOL/L (ref 98–107)
CO2: 20 MMOL/L (ref 20–31)
CREAT SERPL-MCNC: 0.79 MG/DL (ref 0.5–0.9)
ESTIMATED AVERAGE GLUCOSE: 272 MG/DL
GFR AFRICAN AMERICAN: >60 ML/MIN
GFR NON-AFRICAN AMERICAN: >60 ML/MIN
GFR SERPL CREATININE-BSD FRML MDRD: ABNORMAL ML/MIN/{1.73_M2}
GFR SERPL CREATININE-BSD FRML MDRD: ABNORMAL ML/MIN/{1.73_M2}
GLUCOSE BLD-MCNC: 127 MG/DL (ref 65–105)
GLUCOSE BLD-MCNC: 130 MG/DL (ref 65–105)
GLUCOSE BLD-MCNC: 156 MG/DL (ref 65–105)
GLUCOSE BLD-MCNC: 176 MG/DL (ref 70–99)
GLUCOSE BLD-MCNC: 192 MG/DL (ref 65–105)
GLUCOSE BLD-MCNC: 88 MG/DL (ref 65–105)
HBA1C MFR BLD: 11.1 % (ref 4–6)
POTASSIUM SERPL-SCNC: 4.3 MMOL/L (ref 3.7–5.3)
SODIUM BLD-SCNC: 140 MMOL/L (ref 135–144)

## 2021-06-13 PROCEDURE — 6370000000 HC RX 637 (ALT 250 FOR IP): Performed by: STUDENT IN AN ORGANIZED HEALTH CARE EDUCATION/TRAINING PROGRAM

## 2021-06-13 PROCEDURE — 80048 BASIC METABOLIC PNL TOTAL CA: CPT

## 2021-06-13 PROCEDURE — 99223 1ST HOSP IP/OBS HIGH 75: CPT | Performed by: FAMILY MEDICINE

## 2021-06-13 PROCEDURE — 6370000000 HC RX 637 (ALT 250 FOR IP): Performed by: EMERGENCY MEDICINE

## 2021-06-13 PROCEDURE — 2580000003 HC RX 258: Performed by: STUDENT IN AN ORGANIZED HEALTH CARE EDUCATION/TRAINING PROGRAM

## 2021-06-13 PROCEDURE — 82947 ASSAY GLUCOSE BLOOD QUANT: CPT

## 2021-06-13 PROCEDURE — 1200000000 HC SEMI PRIVATE

## 2021-06-13 PROCEDURE — 83036 HEMOGLOBIN GLYCOSYLATED A1C: CPT

## 2021-06-13 PROCEDURE — 6370000000 HC RX 637 (ALT 250 FOR IP): Performed by: NURSE PRACTITIONER

## 2021-06-13 PROCEDURE — 94640 AIRWAY INHALATION TREATMENT: CPT

## 2021-06-13 PROCEDURE — 36415 COLL VENOUS BLD VENIPUNCTURE: CPT

## 2021-06-13 RX ORDER — CYCLOBENZAPRINE HCL 10 MG
10 TABLET ORAL NIGHTLY
Status: DISCONTINUED | OUTPATIENT
Start: 2021-06-13 | End: 2021-06-14 | Stop reason: HOSPADM

## 2021-06-13 RX ORDER — PROMETHAZINE HYDROCHLORIDE 25 MG/ML
25 INJECTION, SOLUTION INTRAMUSCULAR; INTRAVENOUS EVERY 6 HOURS PRN
Status: DISCONTINUED | OUTPATIENT
Start: 2021-06-13 | End: 2021-06-13

## 2021-06-13 RX ORDER — VENLAFAXINE HYDROCHLORIDE 150 MG/1
150 CAPSULE, EXTENDED RELEASE ORAL
Status: DISCONTINUED | OUTPATIENT
Start: 2021-06-13 | End: 2021-06-14 | Stop reason: HOSPADM

## 2021-06-13 RX ORDER — FLUTICASONE PROPIONATE 110 UG/1
2 AEROSOL, METERED RESPIRATORY (INHALATION) 2 TIMES DAILY
Status: DISCONTINUED | OUTPATIENT
Start: 2021-06-13 | End: 2021-06-14 | Stop reason: HOSPADM

## 2021-06-13 RX ORDER — INSULIN GLARGINE 100 [IU]/ML
15 INJECTION, SOLUTION SUBCUTANEOUS 2 TIMES DAILY
Status: DISCONTINUED | OUTPATIENT
Start: 2021-06-13 | End: 2021-06-14

## 2021-06-13 RX ORDER — GABAPENTIN 300 MG/1
300 CAPSULE ORAL 3 TIMES DAILY
Status: DISCONTINUED | OUTPATIENT
Start: 2021-06-13 | End: 2021-06-13

## 2021-06-13 RX ORDER — HYDROCODONE BITARTRATE AND ACETAMINOPHEN 5; 325 MG/1; MG/1
1 TABLET ORAL EVERY 6 HOURS PRN
Status: DISCONTINUED | OUTPATIENT
Start: 2021-06-13 | End: 2021-06-14

## 2021-06-13 RX ORDER — POLYETHYLENE GLYCOL 3350 17 G/17G
17 POWDER, FOR SOLUTION ORAL DAILY
Status: DISCONTINUED | OUTPATIENT
Start: 2021-06-13 | End: 2021-06-14 | Stop reason: HOSPADM

## 2021-06-13 RX ORDER — BUDESONIDE AND FORMOTEROL FUMARATE DIHYDRATE 80; 4.5 UG/1; UG/1
2 AEROSOL RESPIRATORY (INHALATION) 2 TIMES DAILY
Status: DISCONTINUED | OUTPATIENT
Start: 2021-06-13 | End: 2021-06-14 | Stop reason: HOSPADM

## 2021-06-13 RX ORDER — PROMETHAZINE HYDROCHLORIDE 25 MG/1
25 TABLET ORAL EVERY 6 HOURS PRN
Status: DISCONTINUED | OUTPATIENT
Start: 2021-06-13 | End: 2021-06-14 | Stop reason: HOSPADM

## 2021-06-13 RX ORDER — SENNA PLUS 8.6 MG/1
1 TABLET ORAL NIGHTLY
Status: DISCONTINUED | OUTPATIENT
Start: 2021-06-13 | End: 2021-06-14 | Stop reason: HOSPADM

## 2021-06-13 RX ADMIN — INSULIN LISPRO 3 UNITS: 100 INJECTION, SOLUTION INTRAVENOUS; SUBCUTANEOUS at 17:41

## 2021-06-13 RX ADMIN — HYDROCODONE BITARTRATE AND ACETAMINOPHEN 1 TABLET: 5; 325 TABLET ORAL at 21:35

## 2021-06-13 RX ADMIN — HYDROCODONE BITARTRATE AND ACETAMINOPHEN 1 TABLET: 5; 325 TABLET ORAL at 08:30

## 2021-06-13 RX ADMIN — DIPHENHYDRAMINE HCL 25 MG: 25 TABLET ORAL at 08:30

## 2021-06-13 RX ADMIN — PANTOPRAZOLE SODIUM 40 MG: 40 TABLET, DELAYED RELEASE ORAL at 07:00

## 2021-06-13 RX ADMIN — POLYETHYLENE GLYCOL 3350 17 G: 17 POWDER, FOR SOLUTION ORAL at 17:37

## 2021-06-13 RX ADMIN — TRAZODONE HYDROCHLORIDE 150 MG: 50 TABLET ORAL at 21:34

## 2021-06-13 RX ADMIN — FLUTICASONE PROPIONATE 2 PUFF: 110 AEROSOL, METERED RESPIRATORY (INHALATION) at 20:05

## 2021-06-13 RX ADMIN — INSULIN GLARGINE 15 UNITS: 100 INJECTION, SOLUTION SUBCUTANEOUS at 08:35

## 2021-06-13 RX ADMIN — METFORMIN HYDROCHLORIDE 1000 MG: 500 TABLET ORAL at 17:37

## 2021-06-13 RX ADMIN — CYCLOBENZAPRINE 10 MG: 10 TABLET, FILM COATED ORAL at 02:52

## 2021-06-13 RX ADMIN — BUDESONIDE AND FORMOTEROL FUMARATE DIHYDRATE 2 PUFF: 80; 4.5 AEROSOL RESPIRATORY (INHALATION) at 20:04

## 2021-06-13 RX ADMIN — ACETAMINOPHEN 650 MG: 325 TABLET ORAL at 17:38

## 2021-06-13 RX ADMIN — INSULIN GLARGINE 15 UNITS: 100 INJECTION, SOLUTION SUBCUTANEOUS at 23:19

## 2021-06-13 RX ADMIN — CYCLOBENZAPRINE 10 MG: 10 TABLET, FILM COATED ORAL at 12:21

## 2021-06-13 RX ADMIN — VENLAFAXINE HYDROCHLORIDE 150 MG: 150 CAPSULE, EXTENDED RELEASE ORAL at 08:29

## 2021-06-13 RX ADMIN — INSULIN LISPRO 3 UNITS: 100 INJECTION, SOLUTION INTRAVENOUS; SUBCUTANEOUS at 14:52

## 2021-06-13 RX ADMIN — METFORMIN HYDROCHLORIDE 1000 MG: 500 TABLET ORAL at 08:29

## 2021-06-13 RX ADMIN — CYCLOBENZAPRINE 10 MG: 10 TABLET, FILM COATED ORAL at 21:35

## 2021-06-13 RX ADMIN — SODIUM CHLORIDE, PRESERVATIVE FREE 10 ML: 5 INJECTION INTRAVENOUS at 08:34

## 2021-06-13 RX ADMIN — ACETAMINOPHEN 650 MG: 325 TABLET ORAL at 12:21

## 2021-06-13 RX ADMIN — PROMETHAZINE HYDROCHLORIDE 25 MG: 25 TABLET ORAL at 14:51

## 2021-06-13 RX ADMIN — SODIUM CHLORIDE, PRESERVATIVE FREE 10 ML: 5 INJECTION INTRAVENOUS at 21:41

## 2021-06-13 RX ADMIN — GABAPENTIN 900 MG: 300 CAPSULE ORAL at 08:29

## 2021-06-13 RX ADMIN — GABAPENTIN 900 MG: 300 CAPSULE ORAL at 14:50

## 2021-06-13 RX ADMIN — HYDROCODONE BITARTRATE AND ACETAMINOPHEN 1 TABLET: 5; 325 TABLET ORAL at 14:51

## 2021-06-13 RX ADMIN — ONDANSETRON 4 MG: 4 TABLET, ORALLY DISINTEGRATING ORAL at 08:31

## 2021-06-13 RX ADMIN — HYDROCODONE BITARTRATE AND ACETAMINOPHEN 2 TABLET: 5; 325 TABLET ORAL at 02:52

## 2021-06-13 RX ADMIN — GABAPENTIN 900 MG: 300 CAPSULE ORAL at 21:35

## 2021-06-13 RX ADMIN — SENNOSIDES 8.6 MG: 8.6 TABLET, FILM COATED ORAL at 23:19

## 2021-06-13 ASSESSMENT — ENCOUNTER SYMPTOMS
BLOOD IN STOOL: 0
BACK PAIN: 0
COUGH: 0
DIARRHEA: 0
WHEEZING: 0
CONSTIPATION: 0
VOMITING: 0
SHORTNESS OF BREATH: 0
COLOR CHANGE: 0
ABDOMINAL PAIN: 0
SINUS PAIN: 0
NAUSEA: 0
SINUS PRESSURE: 0

## 2021-06-13 ASSESSMENT — PAIN SCALES - GENERAL
PAINLEVEL_OUTOF10: 10
PAINLEVEL_OUTOF10: 2
PAINLEVEL_OUTOF10: 10
PAINLEVEL_OUTOF10: 10
PAINLEVEL_OUTOF10: 7

## 2021-06-13 NOTE — PROGRESS NOTES
[facility]  Progress Note    Date:   6/13/2021  Patient name:  Mely Faust  Date of admission:  6/7/2021  2:28 PM  MRN:   [id number]  YOB: 1965    SUBJECTIVE/Last 24 hours update:     Day 7 Hospitalization:     Patient was seen and examined at bedside. Patient remained afebrile and vitally stable throughout the night. Patient was sleeping comfortably and voiced no concerns. BMP is ordered and pending. Patient is still running and IVF @ 125 ml/hr. REVIEW OF SYSTEMS:      Review of Systems   Constitutional: Negative for chills and fever. HENT: Negative for congestion, sinus pressure and sinus pain. Respiratory: Negative for cough, shortness of breath and wheezing. Cardiovascular: Negative for chest pain, palpitations and leg swelling. Gastrointestinal: Negative for abdominal pain, blood in stool, constipation, diarrhea, nausea and vomiting. Genitourinary: Negative for difficulty urinating, flank pain and hematuria. Musculoskeletal: Negative for arthralgias, back pain and myalgias. Skin: Negative for color change and wound. Neurological: Negative for dizziness, light-headedness and headaches. Psychiatric/Behavioral: Negative for agitation and confusion.        PAST MEDICAL HISTORY:      has a past medical history of Acid reflux, Acute cystitis with hematuria, Acute non-recurrent maxillary sinusitis, Asthma, Bipolar 1 disorder ([address] Bipolar disorder, mixed ([address] BMI 34.0-34.9,adult, Cannabis use disorder, severe, dependence ([address] Cerebrovascular accident (CVA) ([address] Chest pain, Chronic renal insufficiency, Cocaine abuse ([address] DDD (degenerative disc disease), cervical, Diabetes mellitus ([address] Dizziness, Fibromyalgia, History of stroke, Homicidal ideation, Hyperglycemia, Hypertension, Hypotension, IDDM (insulin dependent diabetes mellitus), Lupus ([address] Migraine, Neuropathy, Neuropathy, Polysubstance abuse [facility]), Post traumatic stress disorder (PTSD), Posttraumatic stress disorder, Recurrent depression ([address] Recurrent major depressive disorder, in partial remission ([address] Screening mammogram, encounter for, Severe recurrent major depression with psychotic features ([address] Severe recurrent major depression without psychotic features ([address] Stroke (cerebrum) ([address] Stroke ([address] Suicidal ideation, Suicidal intent, Vitamin D deficiency, and White matter changes. PAST SURGICAL HISTORY:      has a past surgical history that includes Abscess Drainage; chest tube insertion; Abdomen surgery; Cataract removal with implant (Bilateral); LASIK (Bilateral); amputation (Left, 03/13/2021); and Finger amputation (Left, 3/13/2021). SOCIALHISTORY:      reports that she has never smoked. She has never used smokeless tobacco. She reports previous alcohol use. She reports previous drug use. Drugs: Marijuana and Cocaine. FAMILY HISTORY:      family history includes Diabetes in her brother, father, mother, and sister; No Known Problems in her sister; Other in her son; Stroke in her mother. HOME MEDICATIONS:      Prior to Admission medications    Medication Sig Start Date End Date Taking? Authorizing Provider   cyclobenzaprine (FLEXERIL) 10 MG tablet Take 1 tablet by mouth 3 times daily as needed for Muscle spasms 6/11/21  Yes Kalin Madison, DO   insulin glargine (LANTUS SOLOSTAR) 100 UNIT/ML injection pen Inject 20 Units into the skin 2 times daily 6/11/21  Yes Emili Wiggins, DO   Povidone-Iodine Scrub Sponge (BETADINE SWAB AID) 10 % SWAB Apply 1 Stick topically 2 times daily 3/15/21   Roberta Cherry, DO   venlafaxine (EFFEXOR XR) 150 MG extended release capsule Take 1 capsule by mouth daily (with breakfast) 2/10/21   Gopi Tejada MD   Misc.  Devices MISC 1 pair of dm shoes, 3 accommodated inserts 1/22/21   Audie Richardson DPM   dicyclomine (BENTYL) 20 MG tablet Take 1 tablet by mouth 3 times daily as needed (for abdominal discomfort) 1/13/21   Gabriel Doing, MD   cetirizine (ZYRTEC) 10 MG tablet Take 1 tablet by mouth daily 1/13/21   Gabriel Doing, MD   Insulin Pen Needle ([employer] PEN NEEDLES 31G) 31G X 8 MM MISC 1 each by Does not apply route daily 1/4/21   Gabriel Doing, MD   gabapentin (NEURONTIN) 300 MG capsule TAKE 3 CAPSULES 900 MG BY MOUTH THREE TIMES DAILY 12/9/20 3/11/21  Gabriel Doing, MD    MG capsule TAKE 1 CAPSULE BY MOUTH TWICE DAILY 12/9/20   Gabriel Doing, MD   acetaminophen (TYLENOL) 325 MG tablet TAKE 2 TABLETS BY MOUTH EVERY 6 HOURS AS NEEDED FOR PAIN 12/9/20   Gabriel Doing, MD   metFORMIN (GLUCOPHAGE) 1000 MG tablet TAKE 1 TABLET BY MOUTH DAILY WITH BREAKFAST  Patient taking differently: 2 times daily (with meals)  12/9/20   Gabriel Doing, MD   traZODone (DESYREL) 150 MG tablet Take 1 tablet by mouth nightly 11/18/20   Gabriel Doing, MD   omeprazole (PRILOSEC) 20 MG delayed release capsule Take 1 capsule by mouth Daily 11/18/20   Gabriel Soliz, MD JOHNSONSTYLE LITE strip 1 each by Does not apply route 3 times daily 11/11/20   MD Анна Diasyle Lancets MISC 1 each by Does not apply route 3 times daily 11/11/20   Gabriel Soliz, MD   Alcohol Swabs (ALCOHOL PREP) 70 % PADS 1 each by Does not apply route as needed (use as needed) Use_____times per day  Diagnosis: 250.0   Diabetes ___Insulin-Dependent___Non-Insulin Dependent 11/11/20   Gabriel Soliz, MD   benzonatate (TESSALON) 200 MG capsule  8/17/20   Historical Provider, MD   celecoxib (CELEBREX) 200 MG capsule  10/10/20   Historical Provider, MD   albuterol sulfate  (90 Base) MCG/ACT inhaler Inhale 2 puffs into the lungs every 4 hours as needed for Wheezing 10/20/20 3/11/21  Gabriel Doing, MD   FLOVENT  MCG/ACT inhaler Inhale 2 puffs into the lungs 2 times daily 10/20/20   Gabriel Soliz MD   budesonide-formoterol (SYMBICORT) 80-4.5 MCG/ACT AERO Inhale 2 puffs into the lungs 2 times daily 10/20/20   Steve Stuart MD   melatonin (RA MELATONIN) 3 MG TABS tablet Take 1 tablet by mouth daily 10/20/20   Steve Stuart MD       ALLERGIES:     Bactrim [sulfamethoxazole-trimethoprim] and Adhesive tape    OBJECTIVE:       Vitals:    06/11/21 0727 06/11/21 2046 06/12/21 0839 06/12/21 1934   BP: 114/65 124/71 118/73 111/71   Pulse: 101 53 99 99   Resp: 16 16 18 16   Temp: 97.9 °F (36.6 °C) 97.7 °F (36.5 °C) 97.3 °F (36.3 °C) 98.8 °F (37.1 °C)   TempSrc: Oral Oral Oral Oral   SpO2: 100% 97% 97% 91%   Weight:       Height:           No intake or output data in the 24 hours ending 06/13/21 0802    PHYSICAL EXAM:    Physical Exam  Vitals and nursing note reviewed. Constitutional:       Appearance: Normal appearance. HENT:      Head: Normocephalic and atraumatic. Mouth/Throat:      Mouth: Mucous membranes are moist.   Eyes:      Conjunctiva/sclera: Conjunctivae normal.   Cardiovascular:      Rate and Rhythm: Normal rate and regular rhythm. Pulmonary:      Effort: Pulmonary effort is normal.      Breath sounds: Normal breath sounds. Abdominal:      General: Bowel sounds are normal. There is no distension. Palpations: Abdomen is soft. There is no mass. Tenderness: There is no abdominal tenderness. There is no guarding. Musculoskeletal:      Right lower leg: No edema. Left lower leg: No edema. Neurological:      General: No focal deficit present. Mental Status: She is alert and oriented to person, place, and time. Psychiatric:         Mood and Affect: Mood normal.         Behavior: Behavior normal.         ASSESSMENT:      Active Problems:    Hyperglycemia  Resolved Problems:    * No resolved hospital problems. *      PLAN:     1. Hyperglycemia 2/2 poor compliance, Type 2 diabetes mellitus:  - HbA1c: 10 (1/13/2021), repeat ordered.    - POCT glucose: 127, POCT glucose 4x daily.  - Lantus 15 units twice daily.  - Metformin 1000 mg twice daily.  - High-dose insulin sliding scale, hypoglycemic protocol in.      2. COPD:  - Hx of smoking   - Continue Symbicort, flovent and albuterol PRN. 3. Depression/anxiety:  - Continue EFFEXOR XR 15 mg daily. 4. Insomnia:   - Trazodone 150 mg at night. 5. Polyneuropathy 2/2 DM2:  - Gabapentin 900 mg TID.  - Norco q6h PRN. Dispo: Waiting for SNF placement. Plan will be discussed with the attending, Dr. Yisel Dykes MD  Family Medicine Resident  6/13/2021 8:02 AM   Attending Physician Statement  I have discussed the care of BillieaydinTerri DONNA, including pertinent history and exam findings,  with the resident. I have seen and examined the patient and the key elements of all parts of the encounter have been performed by me. I agree with the assessment, plan and orders as documented by the resident. (Nehemiah Bartholomew)  Patient seen and examined along with the resident physicians. Transferred to [facility] yesterday from the Observation Unit. Admitted for Uncontrolled Diabetes/Hyperglycemia/falls/Weakness and needing Skilled Nursing facility. Currently waiting for precertification and  working on placement. Continue current therapy. Bri Hurtado.

## 2021-06-14 VITALS
DIASTOLIC BLOOD PRESSURE: 88 MMHG | WEIGHT: 250 LBS | HEIGHT: 66 IN | BODY MASS INDEX: 40.18 KG/M2 | TEMPERATURE: 97.5 F | HEART RATE: 107 BPM | OXYGEN SATURATION: 96 % | RESPIRATION RATE: 16 BRPM | SYSTOLIC BLOOD PRESSURE: 152 MMHG

## 2021-06-14 LAB
GLUCOSE BLD-MCNC: 106 MG/DL (ref 65–105)
GLUCOSE BLD-MCNC: 134 MG/DL (ref 65–105)

## 2021-06-14 PROCEDURE — 97116 GAIT TRAINING THERAPY: CPT

## 2021-06-14 PROCEDURE — 82947 ASSAY GLUCOSE BLOOD QUANT: CPT

## 2021-06-14 PROCEDURE — 99239 HOSP IP/OBS DSCHRG MGMT >30: CPT | Performed by: FAMILY MEDICINE

## 2021-06-14 PROCEDURE — 2580000003 HC RX 258: Performed by: STUDENT IN AN ORGANIZED HEALTH CARE EDUCATION/TRAINING PROGRAM

## 2021-06-14 PROCEDURE — 6370000000 HC RX 637 (ALT 250 FOR IP): Performed by: STUDENT IN AN ORGANIZED HEALTH CARE EDUCATION/TRAINING PROGRAM

## 2021-06-14 PROCEDURE — 94640 AIRWAY INHALATION TREATMENT: CPT

## 2021-06-14 PROCEDURE — 97110 THERAPEUTIC EXERCISES: CPT

## 2021-06-14 PROCEDURE — 6370000000 HC RX 637 (ALT 250 FOR IP): Performed by: EMERGENCY MEDICINE

## 2021-06-14 RX ORDER — PROMETHAZINE HYDROCHLORIDE 12.5 MG/1
12.5 TABLET ORAL EVERY 6 HOURS
Status: DISCONTINUED | OUTPATIENT
Start: 2021-06-14 | End: 2021-06-14 | Stop reason: HOSPADM

## 2021-06-14 RX ORDER — HYDROCODONE BITARTRATE AND ACETAMINOPHEN 5; 325 MG/1; MG/1
1 TABLET ORAL PRN
Qty: 15 TABLET | Refills: 0 | Status: SHIPPED | OUTPATIENT
Start: 2021-06-14 | End: 2021-06-28

## 2021-06-14 RX ORDER — HYDROCODONE BITARTRATE AND ACETAMINOPHEN 5; 325 MG/1; MG/1
1 TABLET ORAL EVERY 8 HOURS PRN
Status: DISCONTINUED | OUTPATIENT
Start: 2021-06-14 | End: 2021-06-14 | Stop reason: HOSPADM

## 2021-06-14 RX ORDER — PROMETHAZINE HYDROCHLORIDE 12.5 MG/1
12.5 TABLET ORAL EVERY 6 HOURS
Qty: 28 TABLET | Refills: 0 | Status: SHIPPED | OUTPATIENT
Start: 2021-06-14 | End: 2021-06-21

## 2021-06-14 RX ORDER — PANTOPRAZOLE SODIUM 20 MG/1
20 TABLET, DELAYED RELEASE ORAL
Status: DISCONTINUED | OUTPATIENT
Start: 2021-06-14 | End: 2021-06-14 | Stop reason: HOSPADM

## 2021-06-14 RX ORDER — INSULIN GLARGINE 100 [IU]/ML
15 INJECTION, SOLUTION SUBCUTANEOUS 2 TIMES DAILY
Qty: 5 PEN | Refills: 5 | Status: SHIPPED | OUTPATIENT
Start: 2021-06-14 | End: 2021-08-09 | Stop reason: ALTCHOICE

## 2021-06-14 RX ORDER — INSULIN GLARGINE 100 [IU]/ML
10 INJECTION, SOLUTION SUBCUTANEOUS 2 TIMES DAILY
Status: DISCONTINUED | OUTPATIENT
Start: 2021-06-14 | End: 2021-06-14 | Stop reason: HOSPADM

## 2021-06-14 RX ORDER — PANTOPRAZOLE SODIUM 20 MG/1
20 TABLET, DELAYED RELEASE ORAL
Qty: 30 TABLET | Refills: 3 | Status: SHIPPED | OUTPATIENT
Start: 2021-06-14 | End: 2021-07-22 | Stop reason: SDUPTHER

## 2021-06-14 RX ORDER — ONDANSETRON 2 MG/ML
4 INJECTION INTRAMUSCULAR; INTRAVENOUS EVERY 6 HOURS
Status: DISCONTINUED | OUTPATIENT
Start: 2021-06-14 | End: 2021-06-14 | Stop reason: HOSPADM

## 2021-06-14 RX ORDER — SENNA PLUS 8.6 MG/1
1 TABLET ORAL NIGHTLY
Qty: 30 TABLET | Refills: 0 | Status: SHIPPED | OUTPATIENT
Start: 2021-06-14 | End: 2021-06-29

## 2021-06-14 RX ORDER — POLYETHYLENE GLYCOL 3350 17 G/17G
17 POWDER, FOR SOLUTION ORAL DAILY
Qty: 510 G | Refills: 0 | Status: SHIPPED | OUTPATIENT
Start: 2021-06-15 | End: 2021-07-15

## 2021-06-14 RX ADMIN — GABAPENTIN 900 MG: 300 CAPSULE ORAL at 08:21

## 2021-06-14 RX ADMIN — HYDROCODONE BITARTRATE AND ACETAMINOPHEN 1 TABLET: 5; 325 TABLET ORAL at 14:16

## 2021-06-14 RX ADMIN — SODIUM CHLORIDE, PRESERVATIVE FREE 10 ML: 5 INJECTION INTRAVENOUS at 08:30

## 2021-06-14 RX ADMIN — HYDROCODONE BITARTRATE AND ACETAMINOPHEN 1 TABLET: 5; 325 TABLET ORAL at 04:44

## 2021-06-14 RX ADMIN — FLUTICASONE PROPIONATE 2 PUFF: 110 AEROSOL, METERED RESPIRATORY (INHALATION) at 09:21

## 2021-06-14 RX ADMIN — INSULIN GLARGINE 10 UNITS: 100 INJECTION, SOLUTION SUBCUTANEOUS at 10:32

## 2021-06-14 RX ADMIN — PROMETHAZINE HYDROCHLORIDE 25 MG: 25 TABLET ORAL at 10:33

## 2021-06-14 RX ADMIN — HYDROCODONE BITARTRATE AND ACETAMINOPHEN 1 TABLET: 5; 325 TABLET ORAL at 10:32

## 2021-06-14 RX ADMIN — METFORMIN HYDROCHLORIDE 1000 MG: 500 TABLET ORAL at 08:21

## 2021-06-14 RX ADMIN — ACETAMINOPHEN 650 MG: 325 TABLET ORAL at 08:21

## 2021-06-14 RX ADMIN — VENLAFAXINE HYDROCHLORIDE 150 MG: 150 CAPSULE, EXTENDED RELEASE ORAL at 08:21

## 2021-06-14 RX ADMIN — GABAPENTIN 900 MG: 300 CAPSULE ORAL at 14:16

## 2021-06-14 RX ADMIN — BUDESONIDE AND FORMOTEROL FUMARATE DIHYDRATE 2 PUFF: 80; 4.5 AEROSOL RESPIRATORY (INHALATION) at 09:20

## 2021-06-14 ASSESSMENT — ENCOUNTER SYMPTOMS
COUGH: 0
BLOOD IN STOOL: 0
NAUSEA: 0
ABDOMINAL PAIN: 0
WHEEZING: 0
DIARRHEA: 0
COLOR CHANGE: 0
BACK PAIN: 0
VOMITING: 0
SINUS PRESSURE: 0
SINUS PAIN: 0
CONSTIPATION: 0
SHORTNESS OF BREATH: 0

## 2021-06-14 ASSESSMENT — PAIN SCALES - GENERAL
PAINLEVEL_OUTOF10: 10

## 2021-06-14 ASSESSMENT — PAIN DESCRIPTION - LOCATION: LOCATION: ABDOMEN

## 2021-06-14 ASSESSMENT — PAIN DESCRIPTION - ORIENTATION: ORIENTATION: LOWER;MID

## 2021-06-14 ASSESSMENT — PAIN DESCRIPTION - PAIN TYPE: TYPE: ACUTE PAIN

## 2021-06-14 NOTE — PROGRESS NOTES
Physical Therapy  Facility/Department: [address] MED SURG  Daily Treatment Note  NAME: Zain Negron  : 1965  MRN: [id number]    Date of Service: 2021    Discharge Recommendations:  Patient would benefit from continued therapy after discharge   PT Equipment Recommendations  Equipment Needed: Yes  Mobility Devices: Doreene Herman: Rolling    Assessment   Body structures, Functions, Activity limitations: Decreased functional mobility ; Decreased posture;Decreased endurance;Decreased ROM; Decreased strength;Decreased balance; Increased pain  Assessment: Pt able to complete bed mobility without assistance, CGA transfers and amb w/ RW ~40ft. Pt limited by increased pain and endurance and would benefit from continued skilled physical therapy to address endurance and functional mobility deficits to return pt to prior level of independence. Prognosis: Good  PT Education: Goals;PT Role;General Safety;Gait Training;Functional Mobility Training;Transfer Training  REQUIRES PT FOLLOW UP: Yes  Activity Tolerance  Activity Tolerance: Patient limited by endurance; Patient limited by fatigue;Patient limited by pain     Patient Diagnosis(es): The primary encounter diagnosis was Hyperglycemia. A diagnosis of Type 2 diabetes mellitus without complication, without long-term current use of insulin (HCC) was also pertinent to this visit.      has a past medical history of Acid reflux, Acute cystitis with hematuria, Acute non-recurrent maxillary sinusitis, Asthma, Bipolar 1 disorder ([address] Bipolar disorder, mixed ([address] BMI 34.0-34.9,adult, Cannabis use disorder, severe, dependence ([address] Cerebrovascular accident (CVA) ([address] Chest pain, Chronic renal insufficiency, Cocaine abuse ([address] DDD (degenerative disc disease), cervical, Diabetes mellitus ([address] Dizziness, Fibromyalgia, History of stroke, Homicidal ideation, Hyperglycemia, Hypertension, Hypotension, IDDM (insulin dependent diabetes mellitus), Lupus ([address] Migraine, Neuropathy, Neuropathy, Polysubstance abuse ([facility] Post traumatic stress disorder (PTSD), Posttraumatic stress disorder, Recurrent depression ([facility] Recurrent major depressive disorder, in partial remission ([facility] [address] Screening mammogram, encounter for, Severe recurrent major depression with psychotic features ([facility] Severe recurrent major depression without psychotic features ([address] Stroke (cerebrum) ([facility] [address] Stroke ([facility] Suicidal ideation, Suicidal intent, Vitamin D deficiency, and White matter changes. has a past surgical history that includes Abscess Drainage; chest tube insertion; Abdomen surgery; Cataract removal with implant (Bilateral); LASIK (Bilateral); amputation (Left, 03/13/2021); and Finger amputation (Left, 3/13/2021). Restrictions  Restrictions/Precautions  Restrictions/Precautions: Up as Tolerated, Fall Risk  Required Braces or Orthoses?: No  Subjective   General  Chart Reviewed: Yes  Response To Previous Treatment: Patient with no complaints from previous session. Family / Caregiver Present: No  Subjective  Subjective: Pt and RN agreeable to PT this morning. Pt required max encouragement to participate in therapy, stating she is in 10/10 abdominal pain and tearful. Pt agreeable to ambulate to the bathroom and back. General Comment  Comments: Pt returned to supine in bed at end of session. Pain Screening  Patient Currently in Pain: Yes  Pain Assessment  Pain Assessment: 0-10  Pain Level: 10  Patient's Stated Pain Goal: No pain  Pain Type: Acute pain  Pain Location: Abdomen  Pain Orientation: Lower;Mid  Vital Signs  Patient Currently in Pain: Yes       Orientation  Orientation  Overall Orientation Status: Within Functional Limits  Objective   Bed mobility  Supine to Sit: Stand by assistance  Sit to Supine: Stand by assistance  Scooting: Stand by assistance  Comment: HOB elevated, high reliance on bed rails.   Transfers  Sit to Stand: Contact guard assistance  Stand to sit: Contact guard assistance  Comment: RW used. Ambulation  Ambulation?: Yes  Ambulation 1  Surface: level tile  Device: Rolling Walker  Assistance: Contact guard assistance  Quality of Gait: decreased david  Gait Deviations: Decreased step length; Slow David  Distance: 20ft x2 (seated rest break on toilet)  Comments: Pt required an extended seated rest break on toilet d/t pain, pt became tearful and stating she had a headache. Stairs/Curb  Stairs?: No     Balance  Posture: Good  Sitting - Static: Good  Sitting - Dynamic: Good  Standing - Static: Fair;+  Standing - Dynamic: Fair  Comments: Standing balance assessed w/ RW  Exercises  Hip Flexion: x5  Knee Long Arc Quad: x5  Ankle Pumps: x10 BLE  Comments: Pt required max encouragement to participate in LE exercises, Pt then requested to lay down after few exercises. Goals  Short term goals  Time Frame for Short term goals: 15  Short term goal 1: Pt to perform bed mobility and functional transfers independently  Short term goal 2: Ambulate 200ft w/ RW independently  Short term goal 3: Demonstrate standing dynamic balance of good - to decrease fall risk with standing tasks  Short term goal 4: Tolerate 30 minutes of therapy to demo increased endurance  Patient Goals   Patient goals :  To go home    Plan    Plan  Times per week: 5-6x/week  Current Treatment Recommendations: Strengthening, Transfer Training, Endurance Training, Patient/Caregiver Education & Training, ROM, Equipment Evaluation, Education, & procurement, Balance Training, Gait Training, Home Exercise Program, Functional Mobility Training, Stair training, Safety Education & Training  Safety Devices  Type of devices: Left in bed, Call light within reach, Gait belt, Nurse notified, Patient at risk for falls  Restraints  Initially in place: No     Therapy Time   Individual Concurrent Group Co-treatment   Time In 0954         Time Out 1017         Minutes 23         Timed Code Treatment Minutes: [address] [place], PTA

## 2021-06-14 NOTE — DISCHARGE SUMMARY
45 [facility]  Discharge Summary      NAME:  Heri Prasad  :  1965  MRN:  [id number]    Admit date:  2021  Discharge date:  2021    Admitting Physician:  Anna Espino MD    Primary Diagnosis on Admission:   Present on Admission:   Hyperglycemia      Secondary Diagnoses:  does not have any pertinent problems on file. Admission Condition:  poor     Discharged Condition: fair    Hospital Course: The patient was admitted for the management of hyperglycemia due to poor medication compliance due to difficult home situation. .  This is a 61-year-old female with a history of COPD, type 2 diabetes mellitus, depression/anxiety and polyneuropathy secondary to diabetes mellitus came in after having a fall and chronic arthropathy. Patient came to the ED nasal evaluation showed glucose greater than 700,  Slightly elevated creatinine, beta hydroxybutyrate: 3.49, patient was started on DKA protocol and transition to home Lantus. As patient home situation were not appropriate patient opted for skilled nursing facility,  was following and patient got accepted on 2021. During hospitalization patient has symptoms of chronic back pain, chronic knee pain, nausea and vomiting. Patient was hemodynamically stable and symptoms were well controlled before discharge. Patient also requested some DME orders, all the orders were case placed once patient is released from skilled nursing facility she was advised to contact about the DME orders. Today on day of discharge pt feels better with no further complaints. Vitals and Labs are at pts baseline. All consultants involved during this admission are agreeable to d/c. Consults:  none    Significant Diagnostic/theraputic interventions: CBC, CMP.       Lab Results   Component Value Date    WBC 7.6 2021    HGB 10.2 (L) 2021    HCT 33.2 (L) 2021     2021 CHOL 275 (H) 12/30/2020    TRIG 246 (H) 12/30/2020    HDL 80 12/30/2020    ALT 9 06/07/2021    AST 9 06/07/2021     06/13/2021    K 4.3 06/13/2021     (H) 06/13/2021    CREATININE 0.79 06/13/2021    BUN 6 06/13/2021    CO2 20 06/13/2021    TSH 0.81 07/08/2020    LABA1C 11.1 (H) 06/13/2021    LABMICR 20 10/20/2020         Disposition:   [facility]    Instructions to Patient: Patient should take all the medication as prescribed, patient should follow with the PCP for further management of diabetes. Follow up with Majo Green MD in  1 week    [facility] [place] [id number].   [address]  [phone]          Discharge Medications:     Christelkashif Mag   Home Medication Instructions CCB:[id number]    Printed on:06/14/21 1301   Medication Information                      acetaminophen (TYLENOL) 325 MG tablet  TAKE 2 TABLETS BY MOUTH EVERY 6 HOURS AS NEEDED FOR PAIN             albuterol sulfate  (90 Base) MCG/ACT inhaler  Inhale 2 puffs into the lungs every 4 hours as needed for Wheezing             Alcohol Swabs (ALCOHOL PREP) 70 % PADS  1 each by Does not apply route as needed (use as needed) Use_____times per day  Diagnosis: 250.0   Diabetes ___Insulin-Dependent___Non-Insulin Dependent             benzonatate (TESSALON) 200 MG capsule               budesonide-formoterol (SYMBICORT) 80-4.5 MCG/ACT AERO  Inhale 2 puffs into the lungs 2 times daily             celecoxib (CELEBREX) 200 MG capsule               cetirizine (ZYRTEC) 10 MG tablet  Take 1 tablet by mouth daily             cyclobenzaprine (FLEXERIL) 10 MG tablet  Take 1 tablet by mouth 3 times daily as needed for Muscle spasms             dicyclomine (BENTYL) 20 MG tablet  Take 1 tablet by mouth 3 times daily as needed (for abdominal discomfort)              MG capsule  TAKE 1 CAPSULE BY MOUTH TWICE DAILY             FLOVENT  MCG/ACT inhaler  Inhale 2 puffs into the lungs 2 times

## 2021-06-14 NOTE — CARE COORDINATION
Abner Bowman from [employer] that precert is available. PASRR is complete, transprot is arranged through Jaquelin Singletary at [address] and will pick the patient up at 1400. Patient is notified and agrees. Abner Katiedaron from [address] is notified.   Patient states that she will notify her family.  '        Discharge [facility] Case Management Department  Written by: Nissa Roach RN    Patient Name: Negra Escobar  Attending Provider: Johann Salgado MD  Admit Date: 2021  2:28 PM  MRN: [id number]  Account: [de-identified]                     : 1965  Discharge Date:       Disposition: [facility] via [address]. Aruna Lindsey RN

## 2021-06-14 NOTE — PROGRESS NOTES
45 [facility]  Progress Note    Date:   6/14/2021  Patient name:  Joe Lopez  Date of admission:  6/7/2021  2:28 PM  MRN:   [id number]  YOB: 1965    SUBJECTIVE/Last 24 hours update:     Day 7 Hospitalization:     Patient was seen and examined at bedside. Patient remained afebrile and vitally stable throughout the night. Patient was sleeping comfortably and voiced no concerns. Patient is waiting for the pre-CERT, OT/PT is following.  is following. REVIEW OF SYSTEMS:      Review of Systems   Constitutional: Negative for chills and fever. HENT: Negative for congestion, sinus pressure and sinus pain. Respiratory: Negative for cough, shortness of breath and wheezing. Cardiovascular: Negative for chest pain, palpitations and leg swelling. Gastrointestinal: Negative for abdominal pain, blood in stool, constipation, diarrhea, nausea and vomiting. Genitourinary: Negative for difficulty urinating, flank pain and hematuria. Musculoskeletal: Negative for arthralgias, back pain and myalgias. Skin: Negative for color change and wound. Neurological: Negative for dizziness, light-headedness and headaches. Psychiatric/Behavioral: Negative for agitation and confusion.        PAST MEDICAL HISTORY:      has a past medical history of Acid reflux, Acute cystitis with hematuria, Acute non-recurrent maxillary sinusitis, Asthma, Bipolar 1 disorder ([address] Bipolar disorder, mixed ([address] BMI 34.0-34.9,adult, Cannabis use disorder, severe, dependence ([address] Cerebrovascular accident (CVA) ([address] Chest pain, Chronic renal insufficiency, Cocaine abuse ([address] DDD (degenerative disc disease), cervical, Diabetes mellitus ([address] Dizziness, Fibromyalgia, History of stroke, Homicidal ideation, Hyperglycemia, Hypertension, Hypotension, IDDM (insulin dependent diabetes mellitus), Lupus ([address] Migraine, Neuropathy, Neuropathy, Polysubstance (for abdominal discomfort) 1/13/21   Sasha Padilla MD   cetirizine (ZYRTEC) 10 MG tablet Take 1 tablet by mouth daily 1/13/21   Sasha Padilla MD   Insulin Pen Needle ([employer] PEN NEEDLES 31G) 31G X 8 MM MISC 1 each by Does not apply route daily 1/4/21   Sasha Padilla MD   gabapentin (NEURONTIN) 300 MG capsule TAKE 3 CAPSULES 900 MG BY MOUTH THREE TIMES DAILY 12/9/20 3/11/21  Sasha Padilla MD    MG capsule TAKE 1 CAPSULE BY MOUTH TWICE DAILY 12/9/20   Sasha Padilla MD   acetaminophen (TYLENOL) 325 MG tablet TAKE 2 TABLETS BY MOUTH EVERY 6 HOURS AS NEEDED FOR PAIN 12/9/20   Sasha Padilla MD   metFORMIN (GLUCOPHAGE) 1000 MG tablet TAKE 1 TABLET BY MOUTH DAILY WITH BREAKFAST  Patient taking differently: 2 times daily (with meals)  12/9/20   Sasha Padilla MD   traZODone (DESYREL) 150 MG tablet Take 1 tablet by mouth nightly 11/18/20   Sasha Padilla MD   omeprazole (PRILOSEC) 20 MG delayed release capsule Take 1 capsule by mouth Daily 11/18/20   Sasha Padilla MD   FREESTYLE LITE strip 1 each by Does not apply route 3 times daily 11/11/20   MD Stefany ManzanaresStyle Lancets MISC 1 each by Does not apply route 3 times daily 11/11/20   Sasha Padilla MD   Alcohol Swabs (ALCOHOL PREP) 70 % PADS 1 each by Does not apply route as needed (use as needed) Use_____times per day  Diagnosis: 250.0   Diabetes ___Insulin-Dependent___Non-Insulin Dependent 11/11/20   Sasha Padilla MD   benzonatate (TESSALON) 200 MG capsule  8/17/20   Historical Provider, MD   celecoxib (CELEBREX) 200 MG capsule  10/10/20   Historical Provider, MD   albuterol sulfate  (90 Base) MCG/ACT inhaler Inhale 2 puffs into the lungs every 4 hours as needed for Wheezing 10/20/20 3/11/21  Sasha Padilla MD   FLOVENT  MCG/ACT inhaler Inhale 2 puffs into the lungs 2 times daily 10/20/20   Sasha Padilla MD   budesonide-formoterol (SYMBICORT) 80-4.5 MCG/ACT AERO Inhale 2 puffs into the lungs 2 scale, hypoglycemic protocol in.      2. COPD:  - Hx of smoking   - Continue Symbicort, flovent and albuterol PRN. 3. Depression/anxiety:  - Continue EFFEXOR XR 15 mg daily. 4. Insomnia:   - Trazodone 150 mg at night. 5. Polyneuropathy 2/2 DM2:  - Gabapentin 900 mg TID.  - Norco q6h PRN. Dispo: Waiting for SNF placement. Plan will be discussed with the attending, Dr. Tona Rankin MD  Family Medicine Resident  6/14/2021 7:55 AM   Attending Physician Statement  I have discussed the care of Terri Palmer, including pertinent history and exam findings,  with the resident. I have seen and examined the patient and the key elements of all parts of the encounter have been performed by me. I agree with the assessment, plan and orders as documented by the resident. ([address])  Patient seen and examined along with the resident physicians. Complaining of some Epigastric pain due to gastroparesis for which small meals,Reglan and prilosec was recommended. Waiting for precertification approval for Skilled Nursing facility Placement and arrangements to be made by . Continue supportive care and coordination of services. [place] Govern.

## 2021-06-14 NOTE — PLAN OF CARE
Problem: Safety:  Goal: Free from accidental physical injury  Description: Free from accidental physical injury  6/14/2021 1132 by Lucina Amezquita RN  Outcome: Ongoing  6/14/2021 0029 by Ankit Reis RN  Outcome: Ongoing

## 2021-06-29 ENCOUNTER — CARE COORDINATION (OUTPATIENT)
Dept: CARE COORDINATION | Age: 56
End: 2021-06-29

## 2021-06-29 ENCOUNTER — TELEPHONE (OUTPATIENT)
Dept: FAMILY MEDICINE CLINIC | Age: 56
End: 2021-06-29

## 2021-06-29 DIAGNOSIS — M32.9 LUPUS (HCC): ICD-10-CM

## 2021-06-29 DIAGNOSIS — R42 DIZZINESS: Primary | ICD-10-CM

## 2021-06-29 NOTE — TELEPHONE ENCOUNTER
Patient fell again today in her house today with no known injuries. Discharged to home on Jami Gomez taking one every eight hours along with a flexeril q 12 hours. Per Laurie from [facility]. Copy of her current med list sent. New medications Venlafaxine 150 mg daily is a new medication, trazadone 150mg at bed time and  States she is on now on Lyrica 100 mg twice a day, Senekot at bedtime, omeprazole 20 mg at hs. States that the patient has no furniture and is sleeping on a blow up mattress. Daughter does not speak to her and her  passed in March. She was also on a sliding scale insulin . She states the patients memory is very bad and unable to take care of herself. Patient has no way of getting her scripts and home care suggested switching to [address] [place] [address] with bubble packing and they delivery and patient was in agreement. Mari Min states she could not get up to get herself some crackers, she feels the patient needs long term placement. Patient is seeing all modalities of home care at this time and is to see a  tomorrow. She also states the patient does not know where her medication is or if she has any? Mari Min will be doing two visit this week and weekly through the end of August. Also 6 prn nursing visits due to high fall risk till the end of August. Patient has fallen 4 times in the last 24 hours per [facility] staff at apartment office. Patient does have an appointment on 07/02/21 with Dr. Ana Crawford. Patient also requested her medications be switched to [address] [place] [address] where they can be mailed and bubble wrapped per recommendation of home care nurse and patient is also in agreement. Writer explained to nurse that we will be getting [facility] also involved. Writer left a voicemail message for Dm Chau to call writer back. Spoke with Bailey Aparicio information given per conversation she will  patient.

## 2021-06-29 NOTE — CARE COORDINATION
elia.  Diabetes Assessment    Medic Alert ID: No  Meal Planning: Carb counting   How often do you test your blood sugar?: Daily (Comment: fasting)   Do you have barriers with adherence to non-pharmacologic self-management interventions? (Nutrition/Exercise/Self-Monitoring): Yes   Have you ever had to go to the ED for symptoms of low blood sugar?: No       No patient-reported symptoms   Do you have hyperglycemia symptoms?: No   Do you have hypoglycemia symptoms?: No   Last Blood Sugar Value: 95   Blood Sugar Monitoring Regimen: Once a Day   Blood Sugar Trends: No Change          Ambulatory Care Coordination Assessment    Care Coordination Protocol  Program Enrollment: Complex Care  Referral from Primary Care Provider: No  Week 1 - Initial Assessment     Do you have all of your prescriptions and are they filled?: Yes  Barriers to medication adherence: Forgets to take  Are you able to afford your medications?: Yes  How often do you have trouble taking your medications the way you have been told to take them?: Sometimes I take them as prescribed. Do you have Home O2 Therapy?: No      Ability to seek help/take action for Emergent Urgent situations i.e. fire, crime, inclement weather or health crisis. : Independent  Ability to ambulate to restroom: Independent  Ability handle personal hygeine needs (bathing/dressing/grooming): Needs Assistance  Ability to manage Medications: Independent  Ability to prepare Food Preparation: Dependent  Ability to maintain home (clean home, laundry): Needs Assistance  Ability to drive and/or has transportation: Dependent  Ability to do shopping: Needs Assistance  Ability to manage finances: Needs Assistance  Is patient able to live independently?: Yes     Current Housing: Apartment        Per the Fall Risk Screening, did the patient have 2 or more falls or 1 fall with injury in the past year?: Yes  How often do you think you are about to fall and you do NOT fall?  For example, you grab something to stabilize yourself or hold onto a wall/furniture?: Frequently  Use of a Mobility Aid: Yes  Issues with feet or shoes like numbness, edema, shoes not fitting: Yes (Comment: pain in feet, swelling)  Changes in vision, poor vision or poor lighting in environment: Yes (Comment: glasses)  Dizziness: Yes (Comment: sometimes)  Other Fall Risk: No     Frequent urination at night?: Yes  Do you use rails/bars?: Yes  Do you have a non-slip tub mat?: No        Thinking about your patient's physical health needs, are there any symptoms or problems (risk indicators) you are unsure about that require further investigation?: Moderate to severe symptoms or problems that impact on daily life   Are the patients physical health problems impacting on their mental well-being?: Moderate to severe impact upon mental well-being and preventing enjoyment of usual activities   Are there any problems with your patients lifestyle behaviors (alcohol, drugs, diet, exercise) that are impacting on physical or mental well-being?: Moderate to severe impact on well-being, preventing enjoyment of usual activities   Do you have any other concerns about your patients mental well-being? How would you rate their severity and impact on the patient?: Moderate to severe problems that interfere with function   How would you rate their home environment in terms of safety and stability (including domestic violence, insecure housing, neighbor harassment)?: Safe, stable, but with some inconsistency   How do daily activities impact on the patient's well-being? (include current or anticipated unemployment, work, caregiving, access to transportation or other):  Contributes to low mood or stress at times   How would you rate their social network (family, work, friends)?: Restricted participation with some degree of social isolation   How would you rate their financial resources (including ability to afford all required medical care)?: Financially secure, some resource challenges   How wells does the patient now understand their health and well-being (symptoms, signs or risk factors) and what they need to do to manage their health?: Reasonable to good understanding but do not feel able to engage with advice at this time   How well do you think your patient can engage in healthcare discussions? (Barriers include language, deafness, aphasia, alcohol or drug problems, learning difficulties, concentration): Adequate communication, with or without minor barriers   Do other services need to be involved to help this patient?: Other care/services in place but not sufficient   Are current services involved with this patient well-coordinated? (Include coordination with other services you are now recommendation): Required care/services missing and/or fragmented   Suggested Interventions and Community Resources  Diabetes Education: Not Started    Home Health Services: Completed (Comment: [address])   Grief/ Bereavement Counselor: Not Started   Medi Set or Pill Pack: Not Started   Physical Therapy: Not Started   Registered Dietician: Not Started   Social Work: Completed   Transportation Services: In Process   Zone Management Tools: Completed                  Prior to Admission medications    Medication Sig Start Date End Date Taking?  Authorizing Provider   insulin glargine (LANTUS SOLOSTAR) 100 UNIT/ML injection pen Inject 15 Units into the skin 2 times daily 6/14/21  Yes Bautista Jean MD   pantoprazole (PROTONIX) 20 MG tablet Take 1 tablet by mouth 2 times daily (before meals) 6/14/21  Yes Bautista Jean MD   cyclobenzaprine (FLEXERIL) 10 MG tablet Take 1 tablet by mouth 3 times daily as needed for Muscle spasms 6/11/21  Yes Jesus Ceballos DO   Insulin Pen Needle ([employer] PEN NEEDLES 31G) 31G X 8 MM MISC 1 each by Does not apply route daily 1/4/21  Yes Phoebe Goodman MD    MG capsule TAKE 1 CAPSULE BY MOUTH TWICE DAILY 12/9/20  Yes Phoebe Goodman MD   metFORMIN (GLUCOPHAGE) 1000 MG tablet TAKE 1 TABLET BY MOUTH DAILY WITH BREAKFAST  Patient taking differently: 2 times daily (with meals)  12/9/20  Yes Pham Evans MD   traZODone (DESYREL) 150 MG tablet Take 1 tablet by mouth nightly 11/18/20  Yes MD TATUM Fish LITE strip 1 each by Does not apply route 3 times daily 11/11/20  Yes MD Tatum Fish Lancets MISC 1 each by Does not apply route 3 times daily 11/11/20  Yes Pham Evans MD   Alcohol Swabs (ALCOHOL PREP) 70 % PADS 1 each by Does not apply route as needed (use as needed) Use_____times per day  Diagnosis: 250.0   Diabetes ___Insulin-Dependent___Non-Insulin Dependent 11/11/20  Yes Pham Evans MD   FLOVENT  MCG/ACT inhaler Inhale 2 puffs into the lungs 2 times daily 10/20/20  Yes Pham Evans MD   budesonide-formoterol (SYMBICORT) 80-4.5 MCG/ACT AERO Inhale 2 puffs into the lungs 2 times daily 10/20/20  Yes Pham Evans MD   polyethylene glycol (GLYCOLAX) 17 GM/SCOOP powder Take 17 g by mouth daily  Patient not taking: Reported on 6/29/2021 6/15/21 7/15/21  Laly Chandler MD   venlafaxine (EFFEXOR XR) 150 MG extended release capsule Take 1 capsule by mouth daily (with breakfast)  Patient not taking: Reported on 6/29/2021 2/10/21   Joya Byrd MD   Mis.  Devices MISC 1 pair of dm shoes, 3 accommodated inserts 1/22/21   Marlon Garcia DPM   cetirizine (ZYRTEC) 10 MG tablet Take 1 tablet by mouth daily 1/13/21   Pham Evans MD   acetaminophen (TYLENOL) 325 MG tablet TAKE 2 TABLETS BY MOUTH EVERY 6 HOURS AS NEEDED FOR PAIN  Patient not taking: Reported on 6/29/2021 12/9/20   Pham Evans MD   albuterol sulfate  (90 Base) MCG/ACT inhaler Inhale 2 puffs into the lungs every 4 hours as needed for Wheezing 10/20/20 3/11/21  Pham Evans MD       Future Appointments   Date Time Provider Jj Mary   7/2/2021  3:45 PM Phill Hodgson MD [facility]

## 2021-06-30 ENCOUNTER — CARE COORDINATION (OUTPATIENT)
Dept: CARE COORDINATION | Age: 56
End: 2021-06-30

## 2021-06-30 SDOH — ECONOMIC STABILITY: FOOD INSECURITY: WITHIN THE PAST 12 MONTHS, YOU WORRIED THAT YOUR FOOD WOULD RUN OUT BEFORE YOU GOT MONEY TO BUY MORE.: OFTEN TRUE

## 2021-06-30 SDOH — ECONOMIC STABILITY: TRANSPORTATION INSECURITY
IN THE PAST 12 MONTHS, HAS LACK OF TRANSPORTATION KEPT YOU FROM MEETINGS, WORK, OR FROM GETTING THINGS NEEDED FOR DAILY LIVING?: YES

## 2021-06-30 SDOH — ECONOMIC STABILITY: HOUSING INSECURITY: IN THE LAST 12 MONTHS, HOW MANY PLACES HAVE YOU LIVED?: 2

## 2021-06-30 SDOH — ECONOMIC STABILITY: INCOME INSECURITY: IN THE LAST 12 MONTHS, WAS THERE A TIME WHEN YOU WERE NOT ABLE TO PAY THE MORTGAGE OR RENT ON TIME?: YES

## 2021-06-30 SDOH — ECONOMIC STABILITY: FOOD INSECURITY: WITHIN THE PAST 12 MONTHS, THE FOOD YOU BOUGHT JUST DIDN'T LAST AND YOU DIDN'T HAVE MONEY TO GET MORE.: OFTEN TRUE

## 2021-06-30 SDOH — ECONOMIC STABILITY: HOUSING INSECURITY
IN THE LAST 12 MONTHS, WAS THERE A TIME WHEN YOU DID NOT HAVE A STEADY PLACE TO SLEEP OR SLEPT IN A SHELTER (INCLUDING NOW)?: YES

## 2021-06-30 SDOH — ECONOMIC STABILITY: TRANSPORTATION INSECURITY
IN THE PAST 12 MONTHS, HAS THE LACK OF TRANSPORTATION KEPT YOU FROM MEDICAL APPOINTMENTS OR FROM GETTING MEDICATIONS?: YES

## 2021-06-30 ASSESSMENT — SOCIAL DETERMINANTS OF HEALTH (SDOH): HOW HARD IS IT FOR YOU TO PAY FOR THE VERY BASICS LIKE FOOD, HOUSING, MEDICAL CARE, AND HEATING?: HARD

## 2021-06-30 NOTE — CARE COORDINATION
HC phoned and spoke to patient and made introductions and stated the reason for the call. HC presented that AC/Mikayla Hathaway had referred her to  stating that the patient needs medical transportation through her health insurance, and also has food insecurities. HC was able to speak to patient briefly and complete her SDOH and was able to   schedule for patients appointment on 07/02/21. Patient was quite busy today with movers coming to deliver furniture, patient receiving water, and her aide coming to assist her. HC and patient were unable to complete the SW/assessment and to get all of patients needs met. [facility]. and patient agreed to talk on 07/01/21, to complete the process for food and transportation.     Plan of Care   [facility]. will contact patient on 07/01/21

## 2021-06-30 NOTE — CARE COORDINATION
Ambulatory Care Coordination Note  6/30/2021  CM Risk Score: 2  Charlson 10 Year Mortality Risk Score: 79%     ACC: Dorothy Ross    Summary Note: [facility]vi [facility] reports they do not have any medications for her. They report they will blister pack her medications as soon as they receive prescriptions. Appointment notes for Friday's appointment updated with the need to write new prescriptions and send to [facility] reports a LSW will be assigned to work with Yvonne Hernandez. Juan Diego Duggan does not feel Yvonne Hernandez is safe living at home alone. TC to Yvonne David. She reports she will seek counseling at the [facility]. She states she was seen at [facility] in the past and does not feel this was helpful for her. She states this morning she is \"hungry\". She states she has meat in the freezer but is not able to cook it d/t feeling weak and tired. She states she does not have canned goods. She states after paying rent she has $50 left and uses that to pay her phone bill. She reports she receives $79 in food stamps. She is interested in where the food pantries are near her. Yvonne Hernandez states she is not interested in living at a SNF   attempted to connect with Laya Gallagher two times to seek out information about their program and was not able to get a call through. Plan:  Reach out to Yvonne Hernandez after her appointment on Friday. Care Coordination Interventions    Program Enrollment: Complex Care  Referral from Primary Care Provider: No  Suggested Interventions and Community Resources  Diabetes Education: Not Started  Grief Counselor: Not Started  [employer]: Completed (Comment: [facility])  Medi Set or Pill Pack: Not Started  Physical Therapy: Not Started  Registered Dietician: Not Started  Social Work: Completed  Transportation Support:  In Process  Zone Management Tools: Completed (Comment: DM)         Goals Addressed    None         Prior to Admission medications    Medication Sig Start Date End Date Taking? Authorizing Provider   metFORMIN (GLUCOPHAGE) 1000 MG tablet Take 1,000 mg by mouth daily (with breakfast)    Historical Provider, MD   insulin glargine (LANTUS SOLOSTAR) 100 UNIT/ML injection pen Inject 15 Units into the skin 2 times daily 6/14/21   Bautista Lal MD   polyethylene glycol (GLYCOLAX) 17 GM/SCOOP powder Take 17 g by mouth daily  Patient not taking: Reported on 6/29/2021 6/15/21 7/15/21  Nohemy Anders MD   pantoprazole (PROTONIX) 20 MG tablet Take 1 tablet by mouth 2 times daily (before meals) 6/14/21   Bautista Lal MD   cyclobenzaprine (FLEXERIL) 10 MG tablet Take 1 tablet by mouth 3 times daily as needed for Muscle spasms 6/11/21   Justin Wiggins DO   venlafaxine (EFFEXOR XR) 150 MG extended release capsule Take 1 capsule by mouth daily (with breakfast)  Patient not taking: Reported on 6/29/2021 2/10/21   Fernando Presley MD   Misc.  Devices MISC 1 pair of dm shoes, 3 accommodated inserts 1/22/21   Judy Felipe DPM   cetirizine (ZYRTEC) 10 MG tablet Take 1 tablet by mouth daily 1/13/21   Ca Alexander MD   Insulin Pen Needle ([employer] PEN NEEDLES 31G) 31G X 8 MM MISC 1 each by Does not apply route daily 1/4/21   Ca Alexander MD    MG capsule TAKE 1 CAPSULE BY MOUTH TWICE DAILY 12/9/20   Ca Alexander MD   acetaminophen (TYLENOL) 325 MG tablet TAKE 2 TABLETS BY MOUTH EVERY 6 HOURS AS NEEDED FOR PAIN  Patient not taking: Reported on 6/29/2021 12/9/20   Ca Alexander MD   traZODone (DESYREL) 150 MG tablet Take 1 tablet by mouth nightly 11/18/20   MD ELIZABETH IrizarrySTYLE LITE strip 1 each by Does not apply route 3 times daily 11/11/20   Ca Alexander MD   FreeStyle Lancets MISC 1 each by Does not apply route 3 times daily 11/11/20   Ca Alexander MD   Alcohol Swabs (ALCOHOL PREP) 70 % PADS 1 each by Does not apply route as needed (use as needed) Use_____times per day  Diagnosis: 250.0   Diabetes ___Insulin-Dependent___Non-Insulin Dependent 11/11/20   Estrella Addison MD   albuterol sulfate  (90 Base) MCG/ACT inhaler Inhale 2 puffs into the lungs every 4 hours as needed for Wheezing 10/20/20 3/11/21  Estrella Addison MD   FLOVENT  MCG/ACT inhaler Inhale 2 puffs into the lungs 2 times daily 10/20/20   Estrella Addison MD   budesonide-formoterol (SYMBICORT) 80-4.5 MCG/ACT AERO Inhale 2 puffs into the lungs 2 times daily 10/20/20   Estrella Addison MD       Future Appointments   Date Time Provider Jj Mary   7/2/2021  3:45 PM Leonila Brantley MD [facility]

## 2021-06-30 NOTE — CARE COORDINATION
Patient has food insecurities. [facility]. will refer patient to the [address] [place] [address] for groceries to be delivered to her.

## 2021-07-01 ENCOUNTER — CARE COORDINATION (OUTPATIENT)
Dept: CARE COORDINATION | Age: 56
End: 2021-07-01

## 2021-07-01 ENCOUNTER — TELEPHONE (OUTPATIENT)
Dept: FAMILY MEDICINE CLINIC | Age: 56
End: 2021-07-01

## 2021-07-01 DIAGNOSIS — N30.00 ACUTE CYSTITIS WITHOUT HEMATURIA: Primary | ICD-10-CM

## 2021-07-01 RX ORDER — GRANULES FOR ORAL 3 G/1
3 POWDER ORAL ONCE
Qty: 1 EACH | Refills: 0 | Status: SHIPPED | OUTPATIENT
Start: 2021-07-01 | End: 2021-07-01

## 2021-07-01 NOTE — CARE COORDINATION
Diabetes zone tool, walk-in clinic, Mychart and right care right place right time sheets was mailed to pt.

## 2021-07-01 NOTE — CARE COORDINATION
Initial Contact Social Work Note - Ambulatory  7/1/2021      Date of referral:  06/29/21  Referral received from:  STEPHON/Mikayla Ceja Double  Reason for referral:  Medical transportation and food    Previous  referral: No  If yes, brief summary of outcome: N/A    Two Identifiers Verified: Yes    Insurance Provider:  [address]: Adult Child/Children     Status:  N/A    Community Providers: SNAP/Food [place]    ADL Assistance Needed: Bathing    Housing/Living Concerns or Home Modification Needs: N/A    Transportation Concern: Yes    Medication Cost Concern: N/A    Medication Adherence Concern: N/A    Financial Concern(s):  Yes    Income (only if applicable):  SSDI    Ability to Read/Write: Yes    Advance Care Plan:  Patient would like to discuss ACP in the next 3 months    Other:     Identified Needs:  [facility]  ACP proce    Social Work Plan:   [facility] through [address] [place] [address] has made a referral to [facility] for home delivered groceries   [facility] will assist patient with ACP process in 3 months: 10/01/21  Next Steps: [facility] will follow up with patient regarding her medical transportation and grocery delivery. [facility] will text patient HC's name and phone number and the confirmation # for transportation for 07/02/21.      Goals Addressed    None

## 2021-07-01 NOTE — TELEPHONE ENCOUNTER
[facility] called and they will be picking up the patient for physical therapy for 4 weeks twice weekly.

## 2021-07-01 NOTE — TELEPHONE ENCOUNTER
Wants to know if can start OT care for patient 2 times a week for the next three weeks, starting July 4th. Please advise, Thank you.

## 2021-07-01 NOTE — CARE COORDINATION
Writer spoke to pt. She was reminded of appt tomorrow with PCP 7/2/2021. Pt states she has transportation set up for her appt. . pt does not have any concerns today. She states she will address her issue with the doctor tomorrow. FU appts/Provider:    Future Appointments   Date Time Provider Jj Hernandez   7/2/2021  3:45 PM Phill Hodgson  State [place] will update RN JULIAM.

## 2021-07-02 ENCOUNTER — TELEPHONE (OUTPATIENT)
Dept: FAMILY MEDICINE CLINIC | Age: 56
End: 2021-07-02

## 2021-07-02 ENCOUNTER — CARE COORDINATION (OUTPATIENT)
Dept: CARE COORDINATION | Age: 56
End: 2021-07-02

## 2021-07-06 ENCOUNTER — TELEPHONE (OUTPATIENT)
Dept: FAMILY MEDICINE CLINIC | Age: 56
End: 2021-07-06

## 2021-07-06 NOTE — TELEPHONE ENCOUNTER
[facility] nurse called she is at patients house states patient is very confused has not been taking her metformin, nurse states she is out of strips and machine is barely working. She did get a reading of 450mg and they set alarms on the patients phone in hopes that will help her remember to take her medications and blood sugars. Writer informed home care nurse that we have glucometer kits here if she would like to pick it up. Nurse agreed and picked up the glucometer.

## 2021-07-06 NOTE — CARE COORDINATION
Initial Contact Social Work Note - Ambulatory  7/6/2021        Identified Needs:  . Transportation   Groceries/delivered       Social Work Plan:   [facility]. phoned [address], reported patient not receiving transportation on 07/02/21.   Scheduled transportation for 07/12/21, confirmation # [id number]   Confirmed patient's receipt of groceries  On 07/03/21  Radha Calderón Next Steps: will follow up with patient on 07/12/21/transportation       Goals Addressed    None

## 2021-07-08 ENCOUNTER — TELEPHONE (OUTPATIENT)
Dept: FAMILY MEDICINE CLINIC | Age: 56
End: 2021-07-08

## 2021-07-09 ENCOUNTER — CARE COORDINATION (OUTPATIENT)
Dept: CARE COORDINATION | Age: 56
End: 2021-07-09

## 2021-07-09 NOTE — CARE COORDINATION
HC followed up with patient today, thinking that she had a medical appointment. Patient informed the [facility]. that her appointment will be on 07/12/21. Patient stated that she received a text stating that she will be picked up at 7a for her 8:45a appointment.     Plan of Care  [facility] will follow up on patient's transportation for her appointment on 07/12/21

## 2021-07-12 ENCOUNTER — CARE COORDINATION (OUTPATIENT)
Dept: CARE COORDINATION | Age: 56
End: 2021-07-12

## 2021-07-12 ENCOUNTER — HOSPITAL ENCOUNTER (OUTPATIENT)
Age: 56
Setting detail: SPECIMEN
Discharge: HOME | End: 2021-07-12
Payer: COMMERCIAL

## 2021-07-12 ENCOUNTER — OFFICE VISIT (OUTPATIENT)
Dept: FAMILY MEDICINE CLINIC | Age: 56
End: 2021-07-12
Payer: COMMERCIAL

## 2021-07-12 ENCOUNTER — TELEPHONE (OUTPATIENT)
Dept: FAMILY MEDICINE CLINIC | Age: 56
End: 2021-07-12

## 2021-07-12 VITALS
WEIGHT: 250 LBS | DIASTOLIC BLOOD PRESSURE: 82 MMHG | SYSTOLIC BLOOD PRESSURE: 127 MMHG | BODY MASS INDEX: 40.35 KG/M2 | HEART RATE: 106 BPM

## 2021-07-12 DIAGNOSIS — R10.84 GENERALIZED ABDOMINAL PAIN: ICD-10-CM

## 2021-07-12 DIAGNOSIS — Z00.00 HEALTHCARE MAINTENANCE: ICD-10-CM

## 2021-07-12 DIAGNOSIS — E11.69 DIABETES MELLITUS TYPE 2 IN OBESE (HCC): ICD-10-CM

## 2021-07-12 DIAGNOSIS — R30.0 DYSURIA: ICD-10-CM

## 2021-07-12 DIAGNOSIS — M79.7 FIBROMYALGIA: ICD-10-CM

## 2021-07-12 DIAGNOSIS — R30.0 DYSURIA: Primary | ICD-10-CM

## 2021-07-12 DIAGNOSIS — E66.9 DIABETES MELLITUS TYPE 2 IN OBESE (HCC): ICD-10-CM

## 2021-07-12 DIAGNOSIS — Z12.11 COLON CANCER SCREENING: ICD-10-CM

## 2021-07-12 LAB
BILIRUBIN, POC: NEGATIVE
BLOOD URINE, POC: NEGATIVE
CLARITY, POC: CLEAR
COLOR, POC: YELLOW
GLUCOSE URINE, POC: ABNORMAL
HEPATITIS C ANTIBODY: NONREACTIVE
HIV AG/AB: NONREACTIVE
KETONES, POC: ABNORMAL
LEUKOCYTE EST, POC: POSITIVE
NITRITE, POC: ABNORMAL
PH, POC: 5.5
PROTEIN, POC: ABNORMAL
SPECIFIC GRAVITY, POC: 1.02
UROBILINOGEN, POC: ABNORMAL

## 2021-07-12 PROCEDURE — 1111F DSCHRG MED/CURRENT MED MERGE: CPT | Performed by: STUDENT IN AN ORGANIZED HEALTH CARE EDUCATION/TRAINING PROGRAM

## 2021-07-12 PROCEDURE — G8427 DOCREV CUR MEDS BY ELIG CLIN: HCPCS | Performed by: STUDENT IN AN ORGANIZED HEALTH CARE EDUCATION/TRAINING PROGRAM

## 2021-07-12 PROCEDURE — 1036F TOBACCO NON-USER: CPT | Performed by: STUDENT IN AN ORGANIZED HEALTH CARE EDUCATION/TRAINING PROGRAM

## 2021-07-12 PROCEDURE — G8417 CALC BMI ABV UP PARAM F/U: HCPCS | Performed by: STUDENT IN AN ORGANIZED HEALTH CARE EDUCATION/TRAINING PROGRAM

## 2021-07-12 PROCEDURE — 99213 OFFICE O/P EST LOW 20 MIN: CPT | Performed by: STUDENT IN AN ORGANIZED HEALTH CARE EDUCATION/TRAINING PROGRAM

## 2021-07-12 PROCEDURE — 3017F COLORECTAL CA SCREEN DOC REV: CPT | Performed by: STUDENT IN AN ORGANIZED HEALTH CARE EDUCATION/TRAINING PROGRAM

## 2021-07-12 PROCEDURE — 81002 URINALYSIS NONAUTO W/O SCOPE: CPT | Performed by: STUDENT IN AN ORGANIZED HEALTH CARE EDUCATION/TRAINING PROGRAM

## 2021-07-12 PROCEDURE — 3046F HEMOGLOBIN A1C LEVEL >9.0%: CPT | Performed by: STUDENT IN AN ORGANIZED HEALTH CARE EDUCATION/TRAINING PROGRAM

## 2021-07-12 PROCEDURE — 2022F DILAT RTA XM EVC RTNOPTHY: CPT | Performed by: STUDENT IN AN ORGANIZED HEALTH CARE EDUCATION/TRAINING PROGRAM

## 2021-07-12 RX ORDER — DICYCLOMINE HYDROCHLORIDE 10 MG/1
10 CAPSULE ORAL 4 TIMES DAILY
Qty: 120 CAPSULE | Refills: 3 | Status: SHIPPED | OUTPATIENT
Start: 2021-07-12 | End: 2021-12-17 | Stop reason: SDUPTHER

## 2021-07-12 RX ORDER — CEPHALEXIN 500 MG/1
500 CAPSULE ORAL 2 TIMES DAILY
Qty: 14 CAPSULE | Refills: 0 | Status: SHIPPED | OUTPATIENT
Start: 2021-07-12 | End: 2021-07-19

## 2021-07-12 ASSESSMENT — ENCOUNTER SYMPTOMS
SHORTNESS OF BREATH: 0
CHEST TIGHTNESS: 0
BACK PAIN: 1
COLOR CHANGE: 0
COUGH: 0

## 2021-07-12 NOTE — PROGRESS NOTES
Subjective:    Sid Arnold is a 49 y.o. female with  has a past medical history of Acid reflux, Acute cystitis with hematuria, Acute non-recurrent maxillary sinusitis, Asthma, Bipolar 1 disorder ([address] Bipolar disorder, mixed ([address] BMI 34.0-34.9,adult, Cannabis use disorder, severe, dependence ([address] Cerebrovascular accident (CVA) ([address] Chest pain, Chronic renal insufficiency, Cocaine abuse ([address] DDD (degenerative disc disease), cervical, Diabetes mellitus ([address] Dizziness, Fibromyalgia, History of stroke, Homicidal ideation, Hyperglycemia, Hypertension, Hypotension, IDDM (insulin dependent diabetes mellitus), Lupus ([address] Migraine, Neuropathy, Neuropathy, Polysubstance abuse ([address] Post traumatic stress disorder (PTSD), Posttraumatic stress disorder, Recurrent depression ([address] Recurrent major depressive disorder, in partial remission ([address] Screening mammogram, encounter for, Severe recurrent major depression with psychotic features ([address] Severe recurrent major depression without psychotic features ([address] Stroke (cerebrum) ([address] Stroke ([address] Suicidal ideation, Suicidal intent, Vitamin D deficiency, and White matter changes. Family History   Problem Relation Age of Onset   [facility] Diabetes Mother     Stroke Mother    [facility] Diabetes Father     Diabetes Sister     Diabetes Brother     Other Son         GSW    No Known Problems Sister        Presented tothe office today for:  Chief Complaint   Patient presents with    ED Follow-up     falling    Discuss Medications     refills       HPI   Complaining of burning on urination and change in the smell of her urine. States she had a UTI in the nursing home, however she was not prescribed antibiotics.   Also complaining of generalized abdmoninal pain that she states is chronic  Was on PPI in the past    Complaining of generalized neck pain, back pain, shoulder pain, headache  CT of the spine done in the past negative for arthritis, acute processes  History of depression effexor, trazodone  Requesting Norco  Hx of polysubstance abuse    DM II  HBA1C 11.1 in 2021  Lantus 15 units nightly, Metformin 1000 mg daily  Does complain of some fatigue, denies any nocturia aphasia/dyspnea  Reports compliance with medication    Review of Systems   Constitutional: Negative for activity change, appetite change, fatigue and fever. Respiratory: Negative for cough, chest tightness and shortness of breath. Cardiovascular: Negative for chest pain and palpitations. Genitourinary: Positive for dysuria. Negative for difficulty urinating, flank pain, frequency and pelvic pain. Musculoskeletal: Positive for back pain, myalgias and neck pain. Negative for neck stiffness. Skin: Negative for color change, rash and wound. Objective:    /82 (Site: Left Upper Arm, Position: Sitting, Cuff Size: Medium Adult)   Pulse 106   Wt 250 lb (113.4 kg)   LMP  (LMP Unknown)   BMI 40.35 kg/m²    BP Readings from Last 3 Encounters:   07/12/21 127/82   06/14/21 (!) 152/88   03/25/21 138/80     Physical Exam  Constitutional:       Appearance: Normal appearance. She is obese. Cardiovascular:      Rate and Rhythm: Normal rate and regular rhythm. Pulses: Normal pulses. Heart sounds: Normal heart sounds. Pulmonary:      Effort: Pulmonary effort is normal.      Breath sounds: Normal breath sounds. Skin:     General: Skin is warm and dry. Neurological:      Mental Status: She is alert and oriented to person, place, and time. Mental status is at baseline.            Lab Results   Component Value Date    WBC 7.6 06/07/2021    HGB 10.2 (L) 06/07/2021    HCT 33.2 (L) 06/07/2021     06/07/2021    CHOL 275 (H) 12/30/2020    TRIG 246 (H) 12/30/2020    HDL 80 12/30/2020    ALT 9 06/07/2021    AST 9 06/07/2021     06/13/2021    K 4.3 06/13/2021     (H) 06/13/2021    CREATININE 0.79 06/13/2021    BUN 6 06/13/2021    CO2 20 06/13/2021    TSH 0.81 07/08/2020    LABA1C 11.1 (H) 06/13/2021    LABMICR 20 10/20/2020     Lab Results   Component Value Date    CALCIUM 8.1 (L) 06/13/2021     Lab Results   Component Value Date    LDLCHOLESTEROL 146 (H) 12/30/2020       Assessment and Plan:    1. Dysuria  - POCT Urinalysis no Micro  - cephALEXin (KEFLEX) 500 MG capsule; Take 1 capsule by mouth 2 times daily for 7 days  Dispense: 14 capsule; Refill: 0    2. Generalized abdominal pain  - dicyclomine (BENTYL) 10 MG capsule; Take 1 capsule by mouth 4 times daily  Dispense: 120 capsule; Refill: 3    3. Fibromyalgia  - [facility] Opal Hobbs MD, Pain Management, [facility]    4. Diabetes mellitus type 2 in obese (HCC)  - Devin Brito MD, Ophthalmology, [facility]  - metFORMIN (GLUCOPHAGE) 1000 MG tablet; Take 1 tablet by mouth 2 times daily (with meals)  Dispense: 60 tablet; Refill: 1    5. Healthcare maintenance  - HIV Screen; Future  - Hepatitis C Antibody; Future    6. Colon cancer screening  - Cologuard (For External Results Only); Future          Requested Prescriptions     Signed Prescriptions Disp Refills    dicyclomine (BENTYL) 10 MG capsule 120 capsule 3     Sig: Take 1 capsule by mouth 4 times daily    cephALEXin (KEFLEX) 500 MG capsule 14 capsule 0     Sig: Take 1 capsule by mouth 2 times daily for 7 days    metFORMIN (GLUCOPHAGE) 1000 MG tablet 60 tablet 1     Sig: Take 1 tablet by mouth 2 times daily (with meals)       Medications Discontinued During This Encounter   Medication Reason    metFORMIN (GLUCOPHAGE) 1000 MG tablet        Return in about 4 weeks (around 8/9/2021) for DM2.

## 2021-07-12 NOTE — PROGRESS NOTES
Visit Information    Have you changed or started any medications since your last visit including any over-the-counter medicines, vitamins, or herbal medicines? no   Are you having any side effects from any of your medications? -  no  Have you stopped taking any of your medications? Is so, why? -  no    Have you seen any other physician or provider since your last visit? No  Have you had any other diagnostic tests since your last visit? Yes - Records Requested  Have you been seen in the emergency room and/or had an admission to a hospital since we last saw you? Yes - Records Requested  Have you had your routine dental cleaning in the past 6 months? no    Have you activated your [employer] account? If not, what are your barriers?  No:      Patient Care Team:  Gavi Huizar MD as PCP - General (Family Medicine)  Beth Forrester MD as Consulting Physician (Infectious Diseases)  Rabia Lee MD as Consulting Physician (Infectious Diseases)  Rima Madeline as [address] as Health     Medical History Review  Past Medical, Family, and Social History reviewed and does contribute to the patient presenting condition    Health Maintenance   Topic Date Due    Hepatitis C screen  Never done    Pneumococcal 0-64 years Vaccine (1 of 2 - PPSV23) Never done    Diabetic retinal exam  Never done    COVID-19 Vaccine (1) Never done    HIV screen  Never done    Hepatitis B vaccine (1 of 3 - Risk 3-dose series) Never done    Cervical cancer screen  Never done    Colon cancer screen colonoscopy  Never done    Breast cancer screen  Never done    Shingles Vaccine (1 of 2) Never done    Flu vaccine (1) 09/01/2021    A1C test (Diabetic or Prediabetic)  09/13/2021    Diabetic microalbuminuria test  10/20/2021    Lipid screen  12/30/2021    Diabetic foot exam  01/22/2022    DTaP/Tdap/Td vaccine (3 - Td or Tdap) 01/28/2030    Hepatitis A vaccine  Aged Out    Hib vaccine  Aged C/ [place] 19 Meningococcal (ACWY) vaccine  Aged Out

## 2021-07-12 NOTE — CARE COORDINATION
Ambulatory Care Coordination Note  7/12/2021  CM Risk Score: 2  Charlson 10 Year Mortality Risk Score: 79%     ACC: Thomas Sarmiento    Summary Note: Jody Angela reports she continues with abdominal pain 10/10 on pain scale. She reports [facility] as her pharmacy. The pharmacy delivers to Jody Angela. She reports she is able to stand and do some cooking for herself. She states she had scrambled eggs and an apple for breakfast. She reports she has \"turkey parts\" and cabbage on the stove cooking for dinner. She reports her purse is at [facility]. She states it fell out of her belongings bag. She reports a call from the facility but she has no way out there to pick it up. TC to the facility. CM spoke with Beatriz Fernandez and explained Jody Angela does not have a way to the facility to  the purse. Beatriz Fernandez reports she will \"see what I can do to get it out to her\". TC to Jody Angela with an update. She was grateful. Diabetes Assessment    Medic Alert ID: No  Meal Planning: Carb counting   How often do you test your blood sugar?: Daily (Comment: fasting)   Do you have barriers with adherence to non-pharmacologic self-management interventions? (Nutrition/Exercise/Self-Monitoring): Yes   Have you ever had to go to the ED for symptoms of low blood sugar?: No       No patient-reported symptoms   Do you have hyperglycemia symptoms?: No   Last Blood Sugar Value: 105   Blood Sugar Monitoring Regimen: Morning Fasting   Blood Sugar Trends: No Change        Plan:  Reach out to Jody Angela on Thursday to see if she has heard from the facility.         Care Coordination Interventions    Program Enrollment: Complex Care  Referral from Primary Care Provider: No  Suggested Interventions and Community Resources  Diabetes Education: Not Started  Grief Counselor: Not Started  [address]: Completed (Comment: [address])  Medi Set or Pill Pack: Not Started  Physical Therapy: Not Started  Registered Dietician: Not Started  Social Work: Completed  Transportation Support: In Process  Zone Management Tools: Completed (Comment: DM)         Goals Addressed    None         Prior to Admission medications    Medication Sig Start Date End Date Taking? Authorizing Provider   dicyclomine (BENTYL) 10 MG capsule Take 1 capsule by mouth 4 times daily 7/12/21   Vara Harada, MD   cephALEXin (KEFLEX) 500 MG capsule Take 1 capsule by mouth 2 times daily for 7 days 7/12/21 7/19/21  Vara Harada, MD   metFORMIN (GLUCOPHAGE) 1000 MG tablet Take 1 tablet by mouth 2 times daily (with meals) 7/12/21   Vara Harada, MD   insulin glargine (LANTUS SOLOSTAR) 100 UNIT/ML injection pen Inject 15 Units into the skin 2 times daily 6/14/21   Maninder Corbin MD   polyethylene glycol [facility]) 17 GM/SCOOP powder Take 17 g by mouth daily 6/15/21 7/15/21  Bautista Rodriguez MD   pantoprazole (PROTONIX) 20 MG tablet Take 1 tablet by mouth 2 times daily (before meals) 6/14/21   Bautista Rodriguez MD   cyclobenzaprine (FLEXERIL) 10 MG tablet Take 1 tablet by mouth 3 times daily as needed for Muscle spasms 6/11/21   Maria M Wiggins DO   venlafaxine (EFFEXOR XR) 150 MG extended release capsule Take 1 capsule by mouth daily (with breakfast) 2/10/21   Opal Thomason MD   Misc.  Devices MISC 1 pair of dm shoes, 3 accommodated inserts 1/22/21   Joleen Sanchez DPM   cetirizine (ZYRTEC) 10 MG tablet Take 1 tablet by mouth daily 1/13/21   Linda Lobato MD   Insulin Pen Needle ([employer] PEN NEEDLES 31G) 31G X 8 MM MISC 1 each by Does not apply route daily 1/4/21   Linda Lobato MD    MG capsule TAKE 1 CAPSULE BY MOUTH TWICE DAILY 12/9/20   Linda Lobato MD   acetaminophen (TYLENOL) 325 MG tablet TAKE 2 TABLETS BY MOUTH EVERY 6 HOURS AS NEEDED FOR PAIN  Patient not taking: Reported on 6/29/2021 12/9/20   Linda Lobato MD   traZODone (DESYREL) 150 MG tablet Take 1 tablet by mouth nightly 11/18/20   Linda Lobato MD   FREESTYLE LITE strip 1 each by Does not apply route 3 times daily 11/11/20   Doris Mendoza MD   FreeStyle Lancets MISC 1 each by Does not apply route 3 times daily 11/11/20   Doris Mendoza MD   Alcohol Swabs (ALCOHOL PREP) 70 % PADS 1 each by Does not apply route as needed (use as needed) Use_____times per day  Diagnosis: 250.0   Diabetes ___Insulin-Dependent___Non-Insulin Dependent 11/11/20   Doris Mendoza MD   albuterol sulfate  (90 Base) MCG/ACT inhaler Inhale 2 puffs into the lungs every 4 hours as needed for Wheezing 10/20/20 3/11/21  Doris Mendoza MD   FLOVENT  MCG/ACT inhaler Inhale 2 puffs into the lungs 2 times daily 10/20/20   Doris Mendoza MD   budesonide-formoterol (SYMBICORT) 80-4.5 MCG/ACT AERO Inhale 2 puffs into the lungs 2 times daily 10/20/20   Doris Mendoza MD       Future Appointments   Date Time Provider Jj Hernandez   8/9/2021  8:45 AM Phill Hodgson MD [facility]

## 2021-07-12 NOTE — CARE COORDINATION
Patient called the H this morning stating that she had been waiting for transportation since 7a, and no one had come yet and it was 7:16a.  HC  phoned patients transportation and was on hold for quite a lengthy period of time. When the [facility] called the patient back she stated that the transportation was just arriving. HC phoned back this afternoon, patient reported feeling so sick and planning to go to ER, today. She also stated that the transportation did work out  okay, after all, and thanked the [facility] for checking on her.     Plan of Care  [facility] will follow up with patient for other social needs

## 2021-07-12 NOTE — TELEPHONE ENCOUNTER
Toro Angeles from [address] [place] [address] over the phone, patient stated in a lot of pain, referred to contact pain management. She also explain to patient if needed to go to ED. Wanted to give FYI.

## 2021-07-12 NOTE — PROGRESS NOTES
Attending Physician Statement  I have discussed the care of FredaBraylockincluding pertinent history and exam findings,  with the resident. I have reviewed the key elements of all parts of the encounter with the resident. I agree with the assessment, plan and orders as documented by the resident.   (GE Modifier)    UTI- Keflex, C/S  Chronic Pain management- Pain medicine  Non Specific Abdominal pain- Bentyl  HM- next visit

## 2021-07-13 LAB
CULTURE: ABNORMAL
Lab: ABNORMAL
SPECIMEN DESCRIPTION: ABNORMAL

## 2021-07-16 ENCOUNTER — CARE COORDINATION (OUTPATIENT)
Dept: CARE COORDINATION | Age: 56
End: 2021-07-16

## 2021-07-16 NOTE — CARE COORDINATION
received a call from Bridgette. Bridgette reports that she called [place] to schedule transportation. VM reports  is covering for [employer].  confirmed she was covering and inquired how she could assist Bridgette. Bridgette reports that her she was in pain and needed to see a pain doctor. Bridgette requested  to schedule pain management appointment for her.  encouraged Bridgette to make appointment due to being a new patient at facility and will have questions they are going to want answered.  informed Bridgette once she has appointment schedule  can assist with arranging transportation. Liane Apley was agreeable to schedule and call  back. Plan:   Bridgette will call [facility] pain management and schedule appointment. Bridgette will inform  when appointment is to assist with scheduling transportation.

## 2021-07-22 ENCOUNTER — CARE COORDINATION (OUTPATIENT)
Dept: CARE COORDINATION | Age: 56
End: 2021-07-22

## 2021-07-22 DIAGNOSIS — E66.9 DIABETES MELLITUS TYPE 2 IN OBESE (HCC): ICD-10-CM

## 2021-07-22 DIAGNOSIS — E11.69 DIABETES MELLITUS TYPE 2 IN OBESE (HCC): ICD-10-CM

## 2021-07-22 DIAGNOSIS — F31.4 BIPOLAR 1 DISORDER, DEPRESSED, SEVERE (HCC): Primary | ICD-10-CM

## 2021-07-22 DIAGNOSIS — K21.00 GASTROESOPHAGEAL REFLUX DISEASE WITH ESOPHAGITIS WITHOUT HEMORRHAGE: ICD-10-CM

## 2021-07-22 RX ORDER — CALCIUM CARBONATE 160(400)MG
1 TABLET,CHEWABLE ORAL DAILY
Qty: 1 EACH | Refills: 0 | Status: CANCELLED | OUTPATIENT
Start: 2021-07-22

## 2021-07-22 RX ORDER — VENLAFAXINE HYDROCHLORIDE 150 MG/1
150 CAPSULE, EXTENDED RELEASE ORAL
Qty: 30 CAPSULE | Refills: 3 | Status: SHIPPED | OUTPATIENT
Start: 2021-07-22 | End: 2021-12-14

## 2021-07-22 RX ORDER — PANTOPRAZOLE SODIUM 20 MG/1
20 TABLET, DELAYED RELEASE ORAL
Qty: 30 TABLET | Refills: 3 | Status: SHIPPED | OUTPATIENT
Start: 2021-07-22 | End: 2021-12-17 | Stop reason: SDUPTHER

## 2021-07-22 NOTE — CARE COORDINATION
Ambulatory Care Coordination Note  7/22/2021  CM Risk Score: 6  Charlson 10 Year Mortality Risk Score: 79%     ACC: Anita Juanjo    Summary Note:Terri was very talkative and pleasant today. She reports she has some pain but she feels \"pretty good\". She states she got her back pay on her food stamps so she went shopping and purchased spices, flour sugar, vinegar, corn meal, \"you know everything you need in your home\". She states she likes to cook her meals with fresh ingredients as much as possible. She reports she completed the antibiotic prescribed for the UTI. She states her urinary symptoms are gone. She states she received her purse in the mail from the SNF were she went for rehab. It had fallen out of her bag. She states she is waiting for a call from pain management. She called the office for an update and was informed they will reach out to her. She states she needs a rollator. She is currently using her friends. She reports it needs some repair and her friend will be wanting the rollator back at some point. She is also requesting refills on Protonix, Effexor, Lyrica and Metformin. Plan:  Pend orders for the requested refills and rollator, route to PCP. F/U on pain management appointment in 1 week. Diabetes Assessment    Medic Alert ID: No  Meal Planning: Carb counting   How often do you test your blood sugar?: Daily (Comment: fasting)   Do you have barriers with adherence to non-pharmacologic self-management interventions?  (Nutrition/Exercise/Self-Monitoring): Yes   Have you ever had to go to the ED for symptoms of low blood sugar?: No       No patient-reported symptoms   Do you have hyperglycemia symptoms?: No   Do you have hypoglycemia symptoms?: No   Last Blood Sugar Value: 169   Blood Sugar Monitoring Regimen: Morning Fasting   Blood Sugar Trends: No Change              Care Coordination Interventions    Program Enrollment: Complex Care  Referral from Primary Care Provider: No  Suggested Interventions and Community Resources  Diabetes Education: Not Started  Grief Counselor: Not Started  [address]: Completed (Comment: [address])  Medi Set or Pill Pack: Not Started  Physical Therapy: Not Started  Registered Dietician: Not Started  Social Work: Completed  Transportation Support: In Process  Zone Management Tools: Completed (Comment: DM)         Goals Addressed    None         Prior to Admission medications    Medication Sig Start Date End Date Taking? Authorizing Provider   dicyclomine (BENTYL) 10 MG capsule Take 1 capsule by mouth 4 times daily 7/12/21   Iron Penn MD   metFORMIN (GLUCOPHAGE) 1000 MG tablet Take 1 tablet by mouth 2 times daily (with meals) 7/12/21   Iron Penn MD   insulin glargine (LANTUS SOLOSTAR) 100 UNIT/ML injection pen Inject 15 Units into the skin 2 times daily 6/14/21   Cipriano Jean MD   pantoprazole (PROTONIX) 20 MG tablet Take 1 tablet by mouth 2 times daily (before meals) 6/14/21   Bautista Monique MD   cyclobenzaprine (FLEXERIL) 10 MG tablet Take 1 tablet by mouth 3 times daily as needed for Muscle spasms 6/11/21   Homer Wiggins DO   venlafaxine (EFFEXOR XR) 150 MG extended release capsule Take 1 capsule by mouth daily (with breakfast) 2/10/21   Viola Donald MD   Misc.  Devices MISC 1 pair of dm shoes, 3 accommodated inserts 1/22/21   Mar Holt DPM   cetirizine (ZYRTEC) 10 MG tablet Take 1 tablet by mouth daily 1/13/21   Yesenia Vargas MD   Insulin Pen Needle ([employer] PEN NEEDLES 31G) 31G X 8 MM MISC 1 each by Does not apply route daily 1/4/21   Yesenia Vargas MD    MG capsule TAKE 1 CAPSULE BY MOUTH TWICE DAILY 12/9/20   Yesenia Vargas MD   acetaminophen (TYLENOL) 325 MG tablet TAKE 2 TABLETS BY MOUTH EVERY 6 HOURS AS NEEDED FOR PAIN  Patient not taking: Reported on 6/29/2021 12/9/20   Yesenia Vargas MD   traZODone (DESYREL) 150 MG tablet Take 1 tablet by mouth nightly 11/18/20   Yesenia Vargas MD FREESTYLE LITE strip 1 each by Does not apply route 3 times daily 11/11/20   Toy David MD   FreeStyle Lancets MISC 1 each by Does not apply route 3 times daily 11/11/20   Toy David MD   Alcohol Swabs (ALCOHOL PREP) 70 % PADS 1 each by Does not apply route as needed (use as needed) Use_____times per day  Diagnosis: 250.0   Diabetes ___Insulin-Dependent___Non-Insulin Dependent 11/11/20   Toy David MD   albuterol sulfate  (90 Base) MCG/ACT inhaler Inhale 2 puffs into the lungs every 4 hours as needed for Wheezing 10/20/20 3/11/21  Toy David MD   FLOVENT  MCG/ACT inhaler Inhale 2 puffs into the lungs 2 times daily 10/20/20   Toy David MD   budesonide-formoterol (SYMBICORT) 80-4.5 MCG/ACT AERO Inhale 2 puffs into the lungs 2 times daily 10/20/20   Toy David MD       Future Appointments   Date Time Provider Jj Hernandez   8/9/2021  8:45 AM Julienne Unger MD [facility]

## 2021-07-28 ENCOUNTER — TELEPHONE (OUTPATIENT)
Dept: FAMILY MEDICINE CLINIC | Age: 56
End: 2021-07-28

## 2021-07-28 ENCOUNTER — CARE COORDINATION (OUTPATIENT)
Dept: CARE COORDINATION | Age: 56
End: 2021-07-28

## 2021-07-28 NOTE — CARE COORDINATION
Ambulatory Care Coordination Note  7/28/2021  CM Risk Score: 6  Charlson 10 Year Mortality Risk Score: 79%     ACC: Nat Cooney    Summary Note: Corwin Tran calls CM anxious. She reports she is out of Effexor. She states \"my head is not right\". She again is requesting Lyrica for pain. She does have an appointment for pain management on 8.18.21 at 8 AM.  TC to [address] [place] [address]. Madhuri Lanceazam reported her prescriptions that were requested on 7.22.21 are filled and ready for p/u. She reports they have attempted to deliver. and call her. The pharmacy did not have the correct phone number for Corwin Tran. This was updated. Madhuri Montez reports they will deliver the medications. TC to Crowin Tran with an update. She was appreciative for the assistance. Plan:  Call Corwin Tran next week to review blood sugars/diet. Care Coordination Interventions    Program Enrollment: Complex Care  Referral from Primary Care Provider: No  Suggested Interventions and Community Resources  Diabetes Education: Not Started  Grief Counselor: Not Started  [address]: Completed (Comment: [address])  Medi Set or Pill Pack: Not Started  Physical Therapy: Not Started  Registered Dietician: Not Started  Social Work: Completed  Transportation Support: In Process  Zone Management Tools: Completed (Comment: DM)         Goals Addressed    None         Prior to Admission medications    Medication Sig Start Date End Date Taking?  Authorizing Provider   pantoprazole (PROTONIX) 20 MG tablet Take 1 tablet by mouth 2 times daily (before meals) 7/22/21   Walker Sanchez MD   metFORMIN (GLUCOPHAGE) 1000 MG tablet Take 1 tablet by mouth 2 times daily (with meals) 7/22/21   Walker Sanchez MD   venlafaxine (EFFEXOR XR) 150 MG extended release capsule Take 1 capsule by mouth daily (with breakfast) 7/22/21   Walker Sanchez MD   dicyclomine (BENTYL) 10 MG capsule Take 1 capsule by mouth 4 times daily 7/12/21   Camilla Gifford MD   insulin glargine (LANTUS SOLOSTAR) 100 UNIT/ML injection pen Inject 15 Units into the skin 2 times daily 6/14/21   Yair Prater MD   cyclobenzaprine (FLEXERIL) 10 MG tablet Take 1 tablet by mouth 3 times daily as needed for Muscle spasms 6/11/21   Francis Hill DO   Misc.  Devices MISC 1 pair of dm shoes, 3 accommodated inserts 1/22/21   Stacy Hess DPM   cetirizine (ZYRTEC) 10 MG tablet Take 1 tablet by mouth daily 1/13/21   Liana Barrios MD   Insulin Pen Needle ([employer] PEN NEEDLES 31G) 31G X 8 MM MISC 1 each by Does not apply route daily 1/4/21   Laina Barrios MD    MG capsule TAKE 1 CAPSULE BY MOUTH TWICE DAILY 12/9/20   Liana Barrios MD   acetaminophen (TYLENOL) 325 MG tablet TAKE 2 TABLETS BY MOUTH EVERY 6 HOURS AS NEEDED FOR PAIN  Patient not taking: Reported on 6/29/2021 12/9/20   Liana Barrios MD   traZODone (DESYREL) 150 MG tablet Take 1 tablet by mouth nightly 11/18/20   MD ELIZABETH MackSTYLE LITE strip 1 each by Does not apply route 3 times daily 11/11/20   Liana Barrios MD   FreeStyle Lancets MISC 1 each by Does not apply route 3 times daily 11/11/20   Liana Barrios MD   Alcohol Swabs (ALCOHOL PREP) 70 % PADS 1 each by Does not apply route as needed (use as needed) Use_____times per day  Diagnosis: 250.0   Diabetes ___Insulin-Dependent___Non-Insulin Dependent 11/11/20   Liana Barrios MD   albuterol sulfate  (90 Base) MCG/ACT inhaler Inhale 2 puffs into the lungs every 4 hours as needed for Wheezing 10/20/20 3/11/21  Liana Barrios MD   FLOVENT  MCG/ACT inhaler Inhale 2 puffs into the lungs 2 times daily 10/20/20   Liana Barrios MD   budesonide-formoterol (SYMBICORT) 80-4.5 MCG/ACT AERO Inhale 2 puffs into the lungs 2 times daily 10/20/20   Liana Barrios MD       Future Appointments   Date Time Provider Jj Hernandez   8/9/2021  8:45 AM Polo Dillard MD [id number] [id number]   8/18/2021  8:00 AM Wicho Javier MD [address]

## 2021-08-03 ENCOUNTER — CARE COORDINATION (OUTPATIENT)
Dept: CARE COORDINATION | Age: 56
End: 2021-08-03

## 2021-08-03 NOTE — CARE COORDINATION
HC attempted to contact patient today, there was no answer. HC left a message for patient and provided contact information for a return call.     Plan of Care  [facility] [place]. will assist patient with social needs

## 2021-08-05 ENCOUNTER — CARE COORDINATION (OUTPATIENT)
Dept: CARE COORDINATION | Age: 56
End: 2021-08-05

## 2021-08-05 NOTE — CARE COORDINATION
Attempted to return patient's return call to [facility]. . There was no answer, HC left another message for   patient, in hopes to hear from her soon.     Plan of Care  [facility] will attempt to reach out to patient if no return call within a week

## 2021-08-06 ENCOUNTER — CARE COORDINATION (OUTPATIENT)
Dept: CARE COORDINATION | Age: 56
End: 2021-08-06

## 2021-08-06 NOTE — CARE COORDINATION
Initial Contact Social Work Note - Ambulatory  8/6/2021        Identified Needs:   Medical transportation  [address]     Social Work Plan:   [facility]. arranged transportation for patient's appointment on 08/09/21 @8:45a,   through [facility]   [facility]. sent a text message to patient instructing her on scheduling her medical transportation through [facility]. Also provided patient's next    appointment date and time. , and stated that she can schedule up to 30 days in advance. Lawyer Kumar Next Steps: HC will follow up with patient for other social needs      Goals Addressed                 This Visit's Progress       Care Coordination     Community Resource Goal   Improving     Patient states that she needs medical transportation and also has food insecurities. Barriers: fear of failure, lack of motivation, financial, lack of support, overwhelmed by complexity of regimen, stress, time constraints, medication side effects, and lack of education    Plan for overcoming my barriers: [facility]. will assist patient with her medical transportation as needed  [facility]. will refer patient to [address] [place] [address] for assistance with getting addition foods in her home.     Confidence: 9/10    Anticipated Goal Completion Date: 08/30/21

## 2021-08-09 ENCOUNTER — OFFICE VISIT (OUTPATIENT)
Dept: FAMILY MEDICINE CLINIC | Age: 56
End: 2021-08-09
Payer: COMMERCIAL

## 2021-08-09 VITALS
HEART RATE: 104 BPM | SYSTOLIC BLOOD PRESSURE: 134 MMHG | DIASTOLIC BLOOD PRESSURE: 85 MMHG | WEIGHT: 238 LBS | BODY MASS INDEX: 38.41 KG/M2 | TEMPERATURE: 97 F

## 2021-08-09 DIAGNOSIS — J30.2 SEASONAL ALLERGIC RHINITIS, UNSPECIFIED TRIGGER: ICD-10-CM

## 2021-08-09 DIAGNOSIS — E11.69 DIABETES MELLITUS TYPE 2 IN OBESE (HCC): Primary | ICD-10-CM

## 2021-08-09 DIAGNOSIS — Z12.31 ENCOUNTER FOR SCREENING MAMMOGRAM FOR BREAST CANCER: ICD-10-CM

## 2021-08-09 DIAGNOSIS — R29.6 RECURRENT FALLS: ICD-10-CM

## 2021-08-09 DIAGNOSIS — R29.898 LEG WEAKNESS, BILATERAL: ICD-10-CM

## 2021-08-09 DIAGNOSIS — E66.9 DIABETES MELLITUS TYPE 2 IN OBESE (HCC): Primary | ICD-10-CM

## 2021-08-09 DIAGNOSIS — M79.7 FIBROMYALGIA: ICD-10-CM

## 2021-08-09 PROCEDURE — 99213 OFFICE O/P EST LOW 20 MIN: CPT | Performed by: STUDENT IN AN ORGANIZED HEALTH CARE EDUCATION/TRAINING PROGRAM

## 2021-08-09 PROCEDURE — 3046F HEMOGLOBIN A1C LEVEL >9.0%: CPT | Performed by: STUDENT IN AN ORGANIZED HEALTH CARE EDUCATION/TRAINING PROGRAM

## 2021-08-09 PROCEDURE — 2022F DILAT RTA XM EVC RTNOPTHY: CPT | Performed by: STUDENT IN AN ORGANIZED HEALTH CARE EDUCATION/TRAINING PROGRAM

## 2021-08-09 PROCEDURE — G8417 CALC BMI ABV UP PARAM F/U: HCPCS | Performed by: STUDENT IN AN ORGANIZED HEALTH CARE EDUCATION/TRAINING PROGRAM

## 2021-08-09 PROCEDURE — 1036F TOBACCO NON-USER: CPT | Performed by: STUDENT IN AN ORGANIZED HEALTH CARE EDUCATION/TRAINING PROGRAM

## 2021-08-09 PROCEDURE — 3017F COLORECTAL CA SCREEN DOC REV: CPT | Performed by: STUDENT IN AN ORGANIZED HEALTH CARE EDUCATION/TRAINING PROGRAM

## 2021-08-09 PROCEDURE — G8427 DOCREV CUR MEDS BY ELIG CLIN: HCPCS | Performed by: STUDENT IN AN ORGANIZED HEALTH CARE EDUCATION/TRAINING PROGRAM

## 2021-08-09 PROCEDURE — 83036 HEMOGLOBIN GLYCOSYLATED A1C: CPT | Performed by: STUDENT IN AN ORGANIZED HEALTH CARE EDUCATION/TRAINING PROGRAM

## 2021-08-09 RX ORDER — CETIRIZINE HYDROCHLORIDE 10 MG/1
10 TABLET ORAL DAILY
Qty: 90 TABLET | Refills: 1 | Status: ON HOLD | OUTPATIENT
Start: 2021-08-09 | End: 2021-11-08 | Stop reason: ALTCHOICE

## 2021-08-09 RX ORDER — INSULIN GLARGINE 100 [IU]/ML
20 INJECTION, SOLUTION SUBCUTANEOUS 2 TIMES DAILY
Qty: 5 PEN | Refills: 3 | Status: ON HOLD | OUTPATIENT
Start: 2021-08-09 | End: 2021-11-08 | Stop reason: SDUPTHER

## 2021-08-09 RX ORDER — PREGABALIN 75 MG/1
75 CAPSULE ORAL 2 TIMES DAILY
Qty: 60 CAPSULE | Refills: 0 | Status: SHIPPED | OUTPATIENT
Start: 2021-08-09 | End: 2021-11-15

## 2021-08-09 ASSESSMENT — ENCOUNTER SYMPTOMS
RHINORRHEA: 0
EYE DISCHARGE: 0
COUGH: 0
DIARRHEA: 0
VOMITING: 0
SHORTNESS OF BREATH: 0
ABDOMINAL PAIN: 0
CONSTIPATION: 0
NAUSEA: 0
SORE THROAT: 0

## 2021-08-09 NOTE — PROGRESS NOTES
Miguel Palmer (:  1965) is a 49 y.o. female,Established patient, here for evaluation of the following chief complaint(s):  Diabetes (follow up)         ASSESSMENT/PLAN:  1. Diabetes mellitus type 2 in obese (HCC)  -     insulin glargine (LANTUS SOLOSTAR) 100 UNIT/ML injection pen; Inject 20 Units into the skin 2 times daily, Disp-5 pen, R-3Normal  2. Leg weakness, bilateral   -Ordered patient walker with seat and hand brakes. -We will consider sending patient to physical therapy, but patient reports she has no means of transportation.     -Recommended patient call insurance to see if she is eligible with transportation assistance. 3. Encounter for screening mammogram for breast cancer  -     ARMAND Digital Screen Bilateral [[id number]]; Future  4. Recurrent falls   -Perform fall assessment on next visit. 5. Seasonal allergic rhinitis, unspecified trigger  -     cetirizine (ZYRTEC) 10 MG tablet; Take 1 tablet by mouth daily, Disp-90 tablet, R-1Normal  6. Fibromyalgia  -     pregabalin (LYRICA) 75 MG capsule; Take 1 capsule by mouth 2 times daily for 30 days. , Disp-60 capsule, R-0Print        Return in about 2 weeks (around 2021) for assess falls and discuss sleep study. Subjective   SUBJECTIVE/OBJECTIVE:  HPI     Fibromyalgia   50 yo F pt presents today for medication prescription for Lyrica and Tylenol.   -She was recently d/c from nursing care following a fall due to loss of balance. -Previously the pt was on gabapentin for pain control of neuropathy, however she has become refractory and was switched to Lyrica at the nursing home.   -Patient reports tolerating Lyrica well feels it was more helpful than when on gabapentin.  -She has since not had any medication since discharge. Diabetes type 2  -A1c in  was 11.1% and POC A1c today was 12.1%.    -She endorsed being compliant with all medications (metformin and Lantus 15u BID) with \"some fluctuations\" in blood sugar.   -Currently takes Metformin 1000 mg twice daily.  -She reports she is compliant with medications.  -Does not check blood sugars regularly.  -Denies any polyuria polydipsia polyphagia      Review of Systems   Constitutional: Positive for fatigue. Negative for appetite change and fever. HENT: Negative for ear pain, rhinorrhea and sore throat. Eyes: Negative for discharge and visual disturbance. Respiratory: Negative for cough and shortness of breath. Cardiovascular: Negative for chest pain, palpitations and leg swelling. Gastrointestinal: Negative for abdominal pain, constipation, diarrhea, nausea and vomiting. Endocrine: Negative for polyuria. Genitourinary: Negative for dysuria. Musculoskeletal: Negative for arthralgias. Skin: Negative for rash. Neurological: Positive for weakness and light-headedness. Negative for dizziness and headaches. Psychiatric/Behavioral: Negative for agitation. Objective   Physical Exam  Constitutional:       General: She is not in acute distress. Appearance: She is obese. Comments: Patient appears sleepy and dozes off while obtaining HPI   HENT:      Head: Normocephalic and atraumatic. Cardiovascular:      Rate and Rhythm: Normal rate and regular rhythm. Heart sounds: Normal heart sounds. No murmur heard. No friction rub. Pulmonary:      Breath sounds: Normal breath sounds. No wheezing or rales. Abdominal:      General: Bowel sounds are normal.      Palpations: Abdomen is soft. Tenderness: There is no abdominal tenderness. There is no guarding or rebound. Musculoskeletal:         General: No swelling or tenderness. Normal range of motion. Comments: Patient presents with walker that she borrowed from a friend. 3 out of 5 strength lower extremities bilaterally. 5 out of 5 strength upper extremities. Skin:     Findings: No rash. Neurological:      Mental Status: She is alert and oriented to person, place, and time.       Motor: Weakness present. Psychiatric:         Mood and Affect: Mood normal.                An electronic signature was used to authenticate this note.     --Viviane Russell MD

## 2021-08-09 NOTE — PROGRESS NOTES
Attending Physician Statement  I have discussed the care of Eleonora Ruano 49 y.o. female, including pertinent history and exam findings, with the resident Dr. Tanesha Campoverde MD.    History and Exam:   Chief Complaint   Patient presents with    Diabetes     follow up       Past Medical History:   Diagnosis Date    Acid reflux 5/29/2017    Acute cystitis with hematuria 10/11/2016    Acute non-recurrent maxillary sinusitis 11/28/2017    Asthma     Bipolar 1 disorder ([address] 1/4/2018    Bipolar disorder, mixed ([address]     BMI 34.0-34.9,adult 11/28/2017    Cannabis use disorder, severe, dependence ([address] 12/19/2017    Cerebrovascular accident (CVA) ([address] 6/14/2017    Chest pain 11/5/2016    Chronic renal insufficiency     Cocaine abuse ([address] 1/5/2018    DDD (degenerative disc disease), cervical     Diabetes mellitus ([address]     Dizziness 6/13/2017    Fibromyalgia     History of stroke 9/9/2017    Homicidal ideation 11/6/2017    Hyperglycemia     Hypertension     Hypotension 1/18/2019    IDDM (insulin dependent diabetes mellitus) 12/21/2015    Lupus ([address]     Migraine     Neuropathy     Neuropathy     Polysubstance abuse ([address] 9/17/2017    Post traumatic stress disorder (PTSD)     Posttraumatic stress disorder 5/29/2017    Recurrent depression ([address] 5/29/2017    Recurrent major depressive disorder, in partial remission ([address] 11/28/2017    Screening mammogram, encounter for 11/28/2017    Severe recurrent major depression with psychotic features ([address] 12/19/2017    Severe recurrent major depression without psychotic features ([address] 12/19/2017    Stroke (cerebrum) ([address] 6/14/2017    Stroke ([address]     per chart notes    Suicidal ideation     Suicidal intent 3/10/2017    Vitamin D deficiency 11/28/2017    White matter changes 6/13/2017     Allergies   Allergen Reactions    Bactrim [Sulfamethoxazole-Trimethoprim] Swelling    Adhesive Tape Rash      I have seen and examined the patient and the key elements of the encounter have been performed by me. BP Readings from Last 3 Encounters:   08/09/21 134/85   07/12/21 127/82   06/14/21 (!) 152/88     /85 (Site: Left Upper Arm, Position: Sitting, Cuff Size: Large Adult)   Pulse 104   Temp 97 °F (36.1 °C) (Temporal)   Wt 238 lb (108 kg)   LMP  (LMP Unknown)   BMI 38.41 kg/m²   Lab Results   Component Value Date    WBC 7.6 06/07/2021    HGB 10.2 (L) 06/07/2021    HCT 33.2 (L) 06/07/2021     06/07/2021    CHOL 275 (H) 12/30/2020    TRIG 246 (H) 12/30/2020    HDL 80 12/30/2020    ALT 9 06/07/2021    AST 9 06/07/2021     06/13/2021    K 4.3 06/13/2021     (H) 06/13/2021    CREATININE 0.79 06/13/2021    BUN 6 06/13/2021    CO2 20 06/13/2021    TSH 0.81 07/08/2020    LABA1C 11.1 (H) 06/13/2021    LABMICR 20 10/20/2020     Lab Results   Component Value Date    LABALBU 3.6 06/07/2021     No results found for: IRON, TIBC, FERRITIN  Lab Results   Component Value Date    LDLCHOLESTEROL 146 (H) 12/30/2020     I agree with the assessment, plan and the diagnosis of    Diagnosis Orders   1. Diabetes mellitus type 2 in obese (HCC)  insulin glargine (LANTUS SOLOSTAR) 100 UNIT/ML injection pen   2. Leg weakness, bilateral     3. Encounter for screening mammogram for breast cancer  ARMAND Digital Screen Bilateral [[id number]]   4. Recurrent falls     5. Seasonal allergic rhinitis, unspecified trigger  cetirizine (ZYRTEC) 10 MG tablet   6. Fibromyalgia  pregabalin (LYRICA) 75 MG capsule    . I agree with orders as documented by the resident. More than 25 minutes spent  in face to face encounter with the patient and more than half in counseling. Patient's questions were answered. Patient Voiced understanding to the counseling. Return in about 2 weeks (around 8/23/2021) for assess falls and discuss sleep study.    (GC Modifier)-Dr. Kun Barahona MD

## 2021-08-09 NOTE — PROGRESS NOTES
Diabetic visit information    BP Readings from Last 3 Encounters:   07/12/21 127/82   06/14/21 (!) 152/88   03/25/21 138/80       Hemoglobin A1C (%)   Date Value   06/13/2021 11.1 (H)   01/13/2021 10   12/30/2020 9.1     Microalb/Crt. Ratio (mcg/mg creat)   Date Value   10/20/2020 20     LDL Cholesterol (mg/dL)   Date Value   12/30/2020 146 (H)               Have you changed or started any medications since your last visit including any over-the-counter medicines, vitamins, or herbal medicines? no   Have you stopped taking any of your medications? Is so, why? -  no  Are you having any side effects from any of your medications? - no    Have you seen any other physician or provider since your last visit?  no   Have you had any other diagnostic tests since your last visit?  no   Have you been seen in the emergency room and/or had an admission in a hospital since we last saw you?  no     Have you had your annual diabetic retinal (eye) exam? Yes   (ensure copy of exam is in the chart)    Have you had your routine dental cleaning in the past 6 months? no    Do you have an active MyChart account? If not, what are your barriers? No:     Patient Care Team:  Lyubov Chopra MD as PCP - General (Family Medicine)  Leo Guerrero MD as Consulting Physician (Infectious Diseases)  Javad Sandhu MD as Consulting Physician (Infectious Diseases)  Yesenia Cerna as [address] as Health     Medical history Review  Past Medical, Family, and Social History reviewed and does not contribute to the patient presenting condition.     Health Maintenance   Topic Date Due    Pneumococcal 0-64 years Vaccine (1 of 2 - PPSV23) Never done    Diabetic retinal exam  Never done    COVID-19 Vaccine (1) Never done    Hepatitis B vaccine (1 of 3 - Risk 3-dose series) Never done    Cervical cancer screen  Never done    Colon cancer screen colonoscopy  Never done    Breast cancer screen  Never done   Gloria Campos Shingles Vaccine (1 of 2) Never done    Flu vaccine (1) 09/01/2021    A1C test (Diabetic or Prediabetic)  09/13/2021    Diabetic microalbuminuria test  10/20/2021    Lipid screen  12/30/2021    Diabetic foot exam  01/22/2022    DTaP/Tdap/Td vaccine (3 - Td or Tdap) 01/28/2030    Hepatitis C screen  Completed    HIV screen  Completed    Hepatitis A vaccine  Aged Out    Hib vaccine  Aged Out    Meningococcal (ACWY) vaccine  Aged Out

## 2021-08-09 NOTE — PATIENT INSTRUCTIONS
Patient Education        How to Get Up Safely After a Fall: Care Instructions  Your Care Instructions     If you have injuries, health problems, or other reasons that may make it easy for you to fall at home, it is a good idea to learn how to get up safely after a fall. Learning how to get up correctly can help you avoid making an injury worse. Also, knowing what to do if you cannot get up can help you stay safe until help arrives. Follow-up care is a key part of your treatment and safety. Be sure to make and go to all appointments, and call your doctor if you are having problems. It's also a good idea to know your test results and keep a list of the medicines you take. How can you care for yourself after a fall? If you think you can get up  First lie still for a few minutes and think about how you feel. If your body feels okay and you think you can get up safely, follow the rest of the steps below:  1. Look for a chair or other piece of furniture that is close to you. 2. Roll onto your side and rest. Roll by turning your head in the direction you want to roll, move your shoulder and arm, then hip and leg in the same direction. 3. Lie still for a moment to let your blood pressure adjust.  4. Slowly push your upper body up, lift your head, and take a moment to rest.  5. Slowly get up on your hands and knees, and crawl to the chair or other stable piece of furniture. 6. Put your hands on the chair. 7. Move one foot forward, and place it flat on the floor. Your other leg should be bent with the knee on the floor. 8. Rise slowly, turn your body, and sit in the chair. Stay seated for a bit and think about how you feel. Call for help. Even if you feel okay, let someone know what happened to you. You might not know that you have a serious injury. If you cannot get up  1. If you think you are injured after a fall or you cannot get up, try not to panic. 2. Call out for help.   3. If you have a phone within reach or you have an emergency call device, use it to call for help. 4. If you do not have a phone within reach, try to slide yourself toward it. If you cannot get to the phone, try to slide toward a door or window or a place where you think you can be heard. 5. [place] or use an object to make noise so someone might hear you. 6. If you can reach something that you can use for a pillow, place it under your head. Try to stay warm by covering yourself with a blanket or clothing while you wait for help. When should you call for help? Call 911 anytime you think you may need emergency care. For example, call if:    · You passed out (lost consciousness).     · You cannot get up after a fall.     · You have severe pain. Call your doctor now or seek immediate medical care if:    · You have new or worse pain.     · You are dizzy or lightheaded.     · You hit your head. Watch closely for changes in your health, and be sure to contact your doctor if:    · You do not get better as expected. Where can you learn more? Go to https://Rheonix.Talentoday. org and sign in to your Kizoom account. Enter H348 in the [facility] box to learn more about \"How to Get Up Safely After a Fall: Care Instructions. \"     If you do not have an account, please click on the \"Sign Up Now\" link. Current as of: December 7, 2020               Content Version: 12.9  © 2006-2021 [facility], [facility]. Care instructions adapted under license by [facility] ([facility]). If you have questions about a medical condition or this instruction, always ask your healthcare professional. [place] [address] any warranty or liability for your use of this information.

## 2021-08-10 ENCOUNTER — CARE COORDINATION (OUTPATIENT)
Dept: CARE COORDINATION | Age: 56
End: 2021-08-10

## 2021-08-10 LAB — HBA1C MFR BLD: 12.1 %

## 2021-08-10 NOTE — CARE COORDINATION
POCT A1c was ordered at yesterday's appointment. A result is not available in the chart. CM reached out to MA that was working with [facility]. Plan:  Wait a response from [facility]. Call patient again on Thursday if no return call. A1c is in chart from 8.9.21. The value was 12.1. Her A1c one month ago was 11.1.

## 2021-08-11 ENCOUNTER — TELEPHONE (OUTPATIENT)
Dept: PAIN MANAGEMENT | Age: 56
End: 2021-08-11

## 2021-08-11 NOTE — TELEPHONE ENCOUNTER
Attempted to call patient and review smart phrases - no answer. Message states unable to take calls at this time. Unable to leave message.

## 2021-08-13 ENCOUNTER — CARE COORDINATION (OUTPATIENT)
Dept: CARE COORDINATION | Age: 56
End: 2021-08-13

## 2021-08-16 ENCOUNTER — TELEPHONE (OUTPATIENT)
Dept: PAIN MANAGEMENT | Age: 56
End: 2021-08-16

## 2021-08-17 ENCOUNTER — TELEPHONE (OUTPATIENT)
Dept: PAIN MANAGEMENT | Age: 56
End: 2021-08-17

## 2021-08-17 ASSESSMENT — PAIN DESCRIPTION - LOCATION: LOCATION: BACK;LEG;FOOT

## 2021-08-17 ASSESSMENT — PAIN DESCRIPTION - PAIN TYPE: TYPE: CHRONIC PAIN

## 2021-08-17 ASSESSMENT — PAIN DESCRIPTION - PROGRESSION: CLINICAL_PROGRESSION: GRADUALLY WORSENING

## 2021-08-17 ASSESSMENT — PAIN DESCRIPTION - ONSET: ONSET: ON-GOING

## 2021-08-17 ASSESSMENT — PAIN SCALES - GENERAL: PAINLEVEL_OUTOF10: 10

## 2021-08-17 ASSESSMENT — ENCOUNTER SYMPTOMS
WHEEZING: 1
BACK PAIN: 1
CHOKING: 1
ALLERGIC/IMMUNOLOGIC NEGATIVE: 1
CONSTIPATION: 1

## 2021-08-17 ASSESSMENT — PAIN - FUNCTIONAL ASSESSMENT: PAIN_FUNCTIONAL_ASSESSMENT: PREVENTS OR INTERFERES SOME ACTIVE ACTIVITIES AND ADLS

## 2021-08-17 ASSESSMENT — PAIN DESCRIPTION - FREQUENCY: FREQUENCY: CONTINUOUS

## 2021-08-17 ASSESSMENT — PAIN DESCRIPTION - ORIENTATION: ORIENTATION: RIGHT;LEFT

## 2021-08-17 NOTE — TELEPHONE ENCOUNTER
spopke to pt she asks if I can call her back in about an hour, she is busy with grandson and waiting for his mom to pick him up

## 2021-08-17 NOTE — PROGRESS NOTES
[facility]  Patient Pain Assessment  Consultation - Dr. Dalila Foss    Primary Care Physician: Tony Jacobo MD    Chief complaint:   Chief Complaint   Patient presents with    Leg Pain     both and feet    Back Pain   . Caitlyn Bowers is a 49 y.o. female evaluated on 8/18/2021. Patient is referred by Ino Bermeo MD      Patient identification was verified at the start of the visit: Yes    Chief complaint: Caitlyn Bowers is 49 y.o., [place] American female, with  Leg Pain (both and feet) and Back Pain  . HISTORY OF PRESENT ILLNESS:  Caitlyn Bowers is 49 y.o. female with    Diagnosis  M79.7 (ICD-10-CM) - Fibromyalgia    Patient is a 51-year-old female referred to the pain clinic in consultation for pain involving all over the body for long time getting worse. Did physical therapy. Patient has history of lupus erythematosus-she was nursing home was taking the Lyrica and Percocet. Percocet is making her itch-Vicodin does not make her itch. Patient apparently had a blood test done and was diagnosed with lupus after she had allover body pain-she was getting steroid and some other medication for lupus at [facility]. Was seen at [facility] by rheumatologist she is not seeing him now. She was on gabapentin was switched from gabapentin to Lyrica. Patient reports Lyrica is helping  Patient rates her pain at 9 on an average it is sharp dull aching burning shooting stabbing involving entire body-patient cannot relate any factors that aggravate the pain . Patient cannot relate any factors that help the pain  Patient admits to using drugs for the last use was about a year ago. RX Monitoring 8/18/2021   Periodic Controlled Substance Monitoring Assessed functional status.        Current Pain Assessment  Pain Assessment  Pain Assessment: 0-10  Pain Level: 10  Patient's Stated Pain Goal: 2  Pain Type: Chronic pain  Pain Location: Back, Leg, Foot  Pain Orientation: Right, Left  Pain Radiating Towards: left side worse  Pain Descriptors: Aching, Constant, Dull, Jabbing, Nagging, Sharp, Burning, Pins and needles  Pain Frequency: Continuous  Pain Onset: On-going  Clinical Progression: Gradually worsening  Functional Pain Assessment: Prevents or interferes some active activities and ADLs           ADVERSE MEDICATION EFFECTS:   Constipation: yes  Bowel Regimen: Yes  Diet: common adult  Appetite: ok  Sedation: no  Urinary Retention: no    FOCUSED PAIN SCALE:  Highest: 10  Lowest: 7  Average: Range- 9  When and What was your last procedure:       Was your procedure effective: not applicable    ACTIVITY/SOCIAL/EMOTIONAL:  Sleep Pattern: 5 hours per night. With meds  Energy Level: Normal  Currently attending Physical Therapy: No  Home Exercises: never no regular exercise  Mobility: new onset weakness   Do you use assistive devices? walker  Have you fallen in the last 30 days? Yes ssunday  Currently seeing a Psychiatrist or Psychologist: No  Emotional Issues: normal   Mood: appropriate     ABERRANT BEHAVIORS SINCE LAST VISIT:  Have you ever been treated in another Pain Clinic no  Refills for prescriptions appropriate: not applicable  Lost Rx/pills: not applicable  Taking more medication than prescribed: not applicable  Are you receiving PAIN medications from other doctors: not applicable  Last Urine/Serum Drug Screen: 2/7/21 and 2019 ([facility])  Was Serum/UDS as anticipated?  + cocaine  Brought pill bottles in:not applicable   Was Pill count appropriate? :not applicable   Are currently pregnant? not applicable  Recent ER visits: No  Have you had a COVID 19 Vaccine? No  Total SOAP points: 7    BP (!) 114/94   Pulse 102   Temp 97.4 °F (36.3 °C) (Infrared)   Resp 20   Ht 5' 6\" (1.676 m)   Wt 238 lb (108 kg)   LMP  (LMP Unknown)   BMI 38.41 kg/m²   . vs    Past Medical History      Diagnosis Date    Acid reflux 5/29/2017    Acute cystitis with hematuria 10/11/2016    Acute non-recurrent maxillary sinusitis 11/28/2017    Asthma     Bipolar 1 disorder ([address] 1/4/2018    Bipolar disorder, mixed ([address]     BMI 34.0-34.9,adult 11/28/2017    Cannabis use disorder, severe, dependence ([address] 12/19/2017    Cerebrovascular accident (CVA) ([address] 6/14/2017    Chest pain 11/5/2016    Chronic renal insufficiency     Cocaine abuse ([address] 1/5/2018    COVID-19 virus RNA test result unknown     DDD (degenerative disc disease), cervical     Diabetes mellitus ([address]     Dizziness 6/13/2017    Fibromyalgia     History of stroke 9/9/2017    Homicidal ideation 11/6/2017    Hyperglycemia     Hypertension     Hypotension 1/18/2019    IDDM (insulin dependent diabetes mellitus) 12/21/2015    Lupus ([address]     Migraine     Neuropathy     Neuropathy     Polysubstance abuse ([address] 9/17/2017    Post traumatic stress disorder (PTSD)     Posttraumatic stress disorder 5/29/2017    Recurrent depression ([address] 5/29/2017    Recurrent major depressive disorder, in partial remission ([address] 11/28/2017    Screening mammogram, encounter for 11/28/2017    Severe recurrent major depression with psychotic features ([address] 12/19/2017    Severe recurrent major depression without psychotic features ([address] 12/19/2017    Stroke (cerebrum) ([address] 6/14/2017    Stroke ([address]     per chart notes    Suicidal ideation     Suicidal intent 3/10/2017    Vitamin D deficiency 11/28/2017    White matter changes 6/13/2017       Surgical History  Past Surgical History:   Procedure Laterality Date    ABDOMEN SURGERY      drain tube    ABSCESS DRAINAGE      right buttock    AMPUTATION Left 03/13/2021    ring finger left    CATARACT REMOVAL WITH IMPLANT Bilateral     CHEST TUBE INSERTION      FINGER AMPUTATION Left 3/13/2021    LEFT RING FINER AMPUTATION performed by Jens Duran MD at [address] [place] [address] Bilateral        Medications  Current Outpatient Medications   Medication Sig Dispense Refill    cetirizine (ZYRTEC) 10 MG tablet Take 1 tablet by mouth daily 90 tablet 1    insulin glargine (LANTUS SOLOSTAR) 100 UNIT/ML injection pen Inject 20 Units into the skin 2 times daily 5 pen 3    pregabalin (LYRICA) 75 MG capsule Take 1 capsule by mouth 2 times daily for 30 days. 60 capsule 0    pantoprazole (PROTONIX) 20 MG tablet Take 1 tablet by mouth 2 times daily (before meals) 30 tablet 3    metFORMIN (GLUCOPHAGE) 1000 MG tablet Take 1 tablet by mouth 2 times daily (with meals) 60 tablet 1    venlafaxine (EFFEXOR XR) 150 MG extended release capsule Take 1 capsule by mouth daily (with breakfast) 30 capsule 3    dicyclomine (BENTYL) 10 MG capsule Take 1 capsule by mouth 4 times daily 120 capsule 3    Misc. Devices MISC 1 pair of dm shoes, 3 accommodated inserts 1 Device 0    Insulin Pen Needle ([employer] PEN NEEDLES 31G) 31G X 8 MM MISC 1 each by Does not apply route daily 100 each 3     MG capsule TAKE 1 CAPSULE BY MOUTH TWICE DAILY 60 capsule 3    traZODone (DESYREL) 150 MG tablet Take 1 tablet by mouth nightly 14 tablet 5    FREESTYLE LITE strip 1 each by Does not apply route 3 times daily 100 each 5    FreeStyle Lancets MISC 1 each by Does not apply route 3 times daily 100 each 5    Alcohol Swabs (ALCOHOL PREP) 70 % PADS 1 each by Does not apply route as needed (use as needed) Use_____times per day  Diagnosis: 250.0   Diabetes ___Insulin-Dependent___Non-Insulin Dependent 100 each 11    albuterol sulfate  (90 Base) MCG/ACT inhaler Inhale 2 puffs into the lungs every 4 hours as needed for Wheezing 1 Inhaler 5    FLOVENT  MCG/ACT inhaler Inhale 2 puffs into the lungs 2 times daily 1 Inhaler 3    budesonide-formoterol (SYMBICORT) 80-4.5 MCG/ACT AERO Inhale 2 puffs into the lungs 2 times daily 1 Inhaler 5     No current facility-administered medications for this encounter.        Allergies  Bactrim [sulfamethoxazole-trimethoprim] and Adhesive tape    Family History  family history includes Diabetes in her brother, father, mother, and sister; No Known Problems in her sister; Other in her son; Stroke in her mother. Social History  Social History     Socioeconomic History    Marital status: Legally      Spouse name: None    Number of children: None    Years of education: None    Highest education level: None   Occupational History    None   Tobacco Use    Smoking status: Never Smoker    Smokeless tobacco: Never Used   Vaping Use    Vaping Use: Never used   Substance and Sexual Activity    Alcohol use: Not Currently    Drug use: Yes     Types: Marijuana, Cocaine     Comment: + Cocaine 2/2021 also, see Care Everywhere UDS    Sexual activity: Not Currently     Partners: Male   Other Topics Concern    None   Social History Narrative    None     Social Determinants of Health     Financial Resource Strain: High Risk    Difficulty of Paying Living Expenses: Hard   Food Insecurity: Food Insecurity Present    Worried About Running Out of Food in the Last Year: Often true    Kelin of Food in the Last Year: Often true   Transportation Needs: Unmet Transportation Needs    Lack of Transportation (Medical): Yes    Lack of Transportation (Non-Medical): Yes   Physical Activity:     Days of Exercise per Week:     Minutes of Exercise per Session:    Stress:     Feeling of Stress :    Social Connections:     Frequency of Communication with Friends and Family:     Frequency of Social Gatherings with Friends and Family:     Attends [employer] Services:     Active Member of Clubs or Organizations:     Attends Club or Organization Meetings:     Marital Status:    Intimate Partner Violence:     Fear of Current or Ex-Partner:     Emotionally Abused:     Physically Abused:     Sexually Abused:       reports current drug use. Drugs: Marijuana and Cocaine. REVIEW OF SYSTEMS:      Review of Systems   Constitutional: Negative.   Negative for activity change and unexpected weight change. HENT: Negative. Negative for congestion and sore throat. Eyes: Negative for photophobia and visual disturbance. Respiratory: Positive for choking and wheezing. Cardiovascular: Positive for leg swelling. Gastrointestinal: Positive for constipation. Negative for abdominal pain, nausea and vomiting. Endocrine:        History of diabetes mellitus   Genitourinary: Positive for frequency. Negative for dyspareunia and hematuria. Musculoskeletal: Positive for arthralgias, back pain and myalgias. Skin: Negative. Negative for rash. Allergic/Immunologic: Negative. Neurological: Positive for dizziness and headaches. Hematological: Bruises/bleeds easily. Psychiatric/Behavioral: Negative for self-injury, sleep disturbance and suicidal ideas. The patient is nervous/anxious. GENERAL PHYSICAL EXAM:  Vitals: BP (!) 114/94   Pulse 102   Temp 97.4 °F (36.3 °C) (Infrared)   Resp 20   Ht 5' 6\" (1.676 m)   Wt 238 lb (108 kg)   LMP  (LMP Unknown)   BMI 38.41 kg/m² , Body mass index is 38.41 kg/m². Physical Exam  Constitutional:       Appearance: Normal appearance. She is obese. HENT:      Head: Normocephalic and atraumatic. Eyes:      Extraocular Movements: Extraocular movements intact. Pupils: Pupils are equal, round, and reactive to light. Cardiovascular:      Rate and Rhythm: Normal rate and regular rhythm. Pulses: Normal pulses. Pulmonary:      Effort: Pulmonary effort is normal. No respiratory distress. Abdominal:      General: Abdomen is flat. There is no distension. Tenderness: There is no abdominal tenderness. Musculoskeletal:      Cervical back: Neck supple. No rigidity. Right lower leg: No edema. Left lower leg: No edema. Comments: Palpation of the different muscle groups did not elicit any tenderness to be consistent with fibromyalgia. Lymphadenopathy:      Cervical: No cervical adenopathy. Skin:     Findings: No rash. Neurological:      Mental Status: She is alert and oriented to person, place, and time. Cranial Nerves: Cranial nerves are intact. No cranial nerve deficit. Sensory: Sensation is intact. No sensory deficit. Motor: Motor function is intact. No weakness, tremor or atrophy. Deep Tendon Reflexes:      Reflex Scores:       Tricep reflexes are 2+ on the right side and 2+ on the left side. Bicep reflexes are 2+ on the right side and 2+ on the left side. Patellar reflexes are 2+ on the right side and 2+ on the left side. Achilles reflexes are 2+ on the right side and 2+ on the left side. Comments: Uses a walker to help with ambulation   Psychiatric:         Attention and Perception: Attention normal.         Mood and Affect: Mood is depressed. Speech: Speech normal.         Behavior: Behavior normal.         Cognition and Memory: Cognition normal.      Ortho Exam    Nurses Notes and Vital Signs reviewed. DATA  Labs:  Benzodiazepine Screen, Urine   Date Value Ref Range Status   02/07/2021 NEGATIVE NEGATIVE Final     Comment:           (Positive cutoff 200 ng/mL)                      Imaging:  Radiology Images and Reports reviewed where indicated and necessary  mpression   1.  Findings compatible with osteomyelitis at the 4th distal phalanx.  There   is also minimal T2 signal and enhancement at the distal 3rd of the 4th middle   phalanx, which could be reactive osteitis versus early osteomyelitis.  No   definitive MR findings of septic arthritis at the 4th DIP joint.       2.  Soft tissue swelling and edema with deep ulceration at the palmar aspect   of the distal 4th finger.           No evidence of abscess.        Patient Active Problem List   Diagnosis    IDDM (insulin dependent diabetes mellitus)    [facility]    Bipolar disorder, mixed ([facility]    Post traumatic stress disorder (PTSD)    Acute cystitis with hematuria    Hyperglycemia    Suicidal ideation    Arthritis    Chronic back pain    Acid reflux    History of stroke    Polysubstance abuse (HCC)    Bipolar 1 disorder (HCC)    Cocaine abuse (HCC)    Chest pain    Acute non-recurrent maxillary sinusitis    Acute respiratory failure with hypercapnia (HCC)    Alcohol use disorder, severe, dependence (HCC)    Asthma exacerbation attacks    Bilateral edema of lower extremity    Bipolar 1 disorder, depressed, severe (HCC)    BMI 34.0-34.9,adult    Cannabis use disorder, severe, dependence (HCC)    Cocaine use disorder, severe, dependence (HCC)    Dizziness    Dyslipidemia    Family history of colon cancer    History of lupus    Homicidal ideation    Hypotension    Neuropathy    Normocytic anemia    Recurrent depression (HCC)    Recurrent major depressive disorder, in partial remission ([address]    Encounter for screening mammogram for breast cancer    Severe recurrent major depression with psychotic features ([address]    Severe recurrent major depression without psychotic features ([address]    Upper GI bleed    Vitamin D deficiency    White matter changes    Gastroesophageal reflux disease    Stroke (cerebrum) ([address]    Rib lesion    Diabetes mellitus type 2 in obese ([address]    YAEL (generalized anxiety disorder)    Posttraumatic stress disorder    Suicidal intent    Leg weakness, bilateral    Poorly controlled diabetes mellitus ([address]    Acute kidney injury ([address]    Felon of finger    Osteomyelitis ([address]    Recurrent falls    Seasonal allergic rhinitis    Fibromyalgia        ASSESSMENT    Nina Lamas is a 49 y.o. female with     1. Bipolar disorder, mixed ([address]    2. Post traumatic stress disorder (PTSD)    3. Polysubstance abuse ([address]    4. Lupus (HCC)-history of    5. Fibromyalgia-history of           PLAN  Patient's   [x] x-ray    [] CT scan    [] MRI  Were/was  Reviewed. T  [] Patient's findings on the x-ray were explained to the patient using a bone modal.    Other reports reviewed include    [] Bone scan   [] EMG and nerve conduction studies   [x] Referral reports-  I also discussed with him the following treatment options Including advantages and disadvantages of each:    [x] Physical therapy    [] Interventional pain treatment    [] Medication management    [] Surgical options    Patient's OARRS were reviewed. It is acceptable and appears patient is not receiving prescriptions from multiple prescribers. Patient is  forthcoming regarding prescriptions for pain medication in the past  Controlled Substances Monitoring: Periodic Controlled Substance Monitoring: Assessed functional status. (Iam Benton MD)    The following screens were also reviewed  SOAPP- the score is 7. (Values:cates patient is  <4minimal potential  4-7 Moderate potential  >7 High potential  for drug addiction  Counselling/Preventive measures for pain  Control:    [x]  Spine strengthening exercises are discussed with patient in detail. Orders Placed This Encounter   Procedures    DRUG SCREEN, PAIN     Standing Status:   Standing     Number of Occurrences:   1     Patient does not show typical signs of fibromyalgia. No tenderness can be found in any of the the muscle groups to diagnose the fibromyalgia. Patient is advised to see her rheumatologist regarding lupus . At this time I am afraid I do not have much to offer the patient and she is advised to follow-up with her physician. decision Making Process : Patient's health history and referral records thoroughly reviewed before focused physical examination and discussion with patient. Over 50% of today's visit is spent on examining the patient and counseling. Level of complexity of date to be reviewed is Moderate. The chart date reviewed include the following: Imaging Reports. Summary of Care. Time spent reviewing with patient the below reports:   Medication safety, Treatment options.     Level of diagnosis and management options of this case is multiple: involving the following management options: Interventions as needed, medication management as appropriate, future visits, activity modification, heat/ice as needed, Urine drug screen as required. [x]The patient's questions were answered to the best of my abilities. Documentation:  I communicated with the patient and/or health care decision maker about plan of care  Details of this discussion including any medical advice provided: Total Time: 45-55 minutes  . This note was created using voice recognition software. There may be inaccuracies of transcription  that are inadvertently overlooked prior to the signature. There is any questions about the transcription please contact me.     Electronically signed by Nia Mcdonald MD on 8/18/2021 at 9:13 AM

## 2021-08-18 ENCOUNTER — HOSPITAL ENCOUNTER (OUTPATIENT)
Dept: PAIN MANAGEMENT | Age: 56
Discharge: HOME | End: 2021-08-18
Payer: COMMERCIAL

## 2021-08-18 VITALS
DIASTOLIC BLOOD PRESSURE: 94 MMHG | TEMPERATURE: 97.4 F | WEIGHT: 238 LBS | SYSTOLIC BLOOD PRESSURE: 114 MMHG | HEIGHT: 66 IN | BODY MASS INDEX: 38.25 KG/M2 | HEART RATE: 102 BPM | RESPIRATION RATE: 20 BRPM

## 2021-08-18 DIAGNOSIS — M79.7 FIBROMYALGIA: ICD-10-CM

## 2021-08-18 DIAGNOSIS — F43.10 POST TRAUMATIC STRESS DISORDER (PTSD): ICD-10-CM

## 2021-08-18 DIAGNOSIS — F31.60 BIPOLAR DISORDER, MIXED (HCC): Primary | ICD-10-CM

## 2021-08-18 DIAGNOSIS — F19.10 POLYSUBSTANCE ABUSE (HCC): ICD-10-CM

## 2021-08-18 DIAGNOSIS — M32.9 LUPUS (HCC): ICD-10-CM

## 2021-08-18 PROCEDURE — 99244 OFF/OP CNSLTJ NEW/EST MOD 40: CPT | Performed by: PAIN MEDICINE

## 2021-08-18 PROCEDURE — 99203 OFFICE O/P NEW LOW 30 MIN: CPT

## 2021-08-18 PROCEDURE — 80307 DRUG TEST PRSMV CHEM ANLYZR: CPT

## 2021-08-18 ASSESSMENT — ENCOUNTER SYMPTOMS
PHOTOPHOBIA: 0
SORE THROAT: 0
ABDOMINAL PAIN: 0
NAUSEA: 0
VOMITING: 0

## 2021-08-20 ENCOUNTER — CARE COORDINATION (OUTPATIENT)
Dept: CARE COORDINATION | Age: 56
End: 2021-08-20

## 2021-08-20 NOTE — CARE COORDINATION
Initial Contact Social Work Note - Ambulatory  8/20/2021        Identified Needs:   Medical Transportation          Social Work Plan:   [facility] patients transportation for [employer]. 08/25/21 to [facility]. Sonny/Lisa @ 10:15a. Patient will be picked up @ 8:30a, confirmation # [id number]. HC arranged for patient's transportation to [facility] on Thurs. 08/26/21 for an 8:30a appointment. Patient will be picked up at/or around 6:30a.and transported to [address] [place] [address]. Confirmation #I is [id number]. This information will be texted to patient .     Next Steps: HC will text this information to patient on 08/23/21. Goals Addressed                 This Visit's Progress       Care Coordination     Community Resource Goal   Improving     Patient states that she needs medical transportation and also has food insecurities. Barriers: fear of failure, lack of motivation, financial, lack of support, overwhelmed by complexity of regimen, stress, time constraints, medication side effects, and lack of education    Plan for overcoming my barriers: [facility] will assist patient with her medical transportation as needed  [facility] will refer patient to [address] [place] [address] for assistance with getting addition foods in her home.     Confidence: 9/10    Anticipated Goal Completion Date: 08/30/21

## 2021-08-22 ENCOUNTER — CLINICAL DOCUMENTATION (OUTPATIENT)
Dept: PAIN MANAGEMENT | Age: 56
End: 2021-08-22

## 2021-08-22 LAB
6-ACETYLMORPHINE, UR: NOT DETECTED
7-AMINOCLONAZEPAM, URINE: NOT DETECTED
ALPHA-OH-ALPRAZ, URINE: NOT DETECTED
ALPHA-OH-MIDAZOLAM, URINE: NOT DETECTED
ALPRAZOLAM, URINE: NOT DETECTED
AMPHETAMINES, URINE: NOT DETECTED
BARBITURATES, URINE: NOT DETECTED
BENZOYLECGONINE, UR: NOT DETECTED
BUPRENORPHINE URINE: NOT DETECTED
CARISOPRODOL, UR: NOT DETECTED
CLONAZEPAM, URINE: NOT DETECTED
CODEINE, URINE: NOT DETECTED
CREATININE URINE: 43 MG/DL (ref 20–400)
DIAZEPAM, URINE: NOT DETECTED
DRUGS EXPECTED, UR: NORMAL
EER HI RES INTERP UR: NORMAL
ETHYL GLUCURONIDE UR: NOT DETECTED
FENTANYL URINE: NOT DETECTED
GABAPENTIN: PRESENT
HYDROCODONE, URINE: NOT DETECTED
HYDROMORPHONE, URINE: NOT DETECTED
LORAZEPAM, URINE: NOT DETECTED
MARIJUANA METAB, UR: NOT DETECTED
MDA, UR: NOT DETECTED
MDEA, EVE, UR: NOT DETECTED
MDMA URINE: NOT DETECTED
MEPERIDINE METAB, UR: NOT DETECTED
METHADONE, URINE: NOT DETECTED
METHAMPHETAMINE, URINE: NOT DETECTED
METHYLPHENIDATE: NOT DETECTED
MIDAZOLAM, URINE: NOT DETECTED
MORPHINE URINE: PRESENT
NALOXONE URINE: NOT DETECTED
NORBUPRENORPHINE, URINE: NOT DETECTED
NORDIAZEPAM, URINE: NOT DETECTED
NORFENTANYL, URINE: NOT DETECTED
NORHYDROCODONE, URINE: NOT DETECTED
NOROXYCODONE, URINE: NOT DETECTED
NOROXYMORPHONE, URINE: NOT DETECTED
OXAZEPAM, URINE: NOT DETECTED
OXYCODONE URINE: NOT DETECTED
OXYMORPHONE, URINE: NOT DETECTED
PAIN MANAGEMENT DRUG PANEL INTERP, URINE: NORMAL
PAIN MGT DRUG PANEL, HI RES, UR: NORMAL
PCP,URINE: NOT DETECTED
PHENTERMINE, UR: NOT DETECTED
PREGABALIN: NOT DETECTED
TAPENTADOL, URINE: NOT DETECTED
TAPENTADOL-O-SULFATE, URINE: NOT DETECTED
TEMAZEPAM, URINE: NOT DETECTED
TRAMADOL, URINE: NOT DETECTED
ZOLPIDEM METABOLITE (ZCA), URINE: NOT DETECTED
ZOLPIDEM, URINE: NOT DETECTED

## 2021-08-24 ENCOUNTER — CARE COORDINATION (OUTPATIENT)
Dept: CARE COORDINATION | Age: 56
End: 2021-08-24

## 2021-08-25 ENCOUNTER — CARE COORDINATION (OUTPATIENT)
Dept: CARE COORDINATION | Age: 56
End: 2021-08-25

## 2021-08-25 SDOH — HEALTH STABILITY: PHYSICAL HEALTH: ON AVERAGE, HOW MANY MINUTES DO YOU ENGAGE IN EXERCISE AT THIS LEVEL?: 0 MIN

## 2021-08-25 SDOH — HEALTH STABILITY: PHYSICAL HEALTH: ON AVERAGE, HOW MANY DAYS PER WEEK DO YOU ENGAGE IN MODERATE TO STRENUOUS EXERCISE (LIKE A BRISK WALK)?: 0 DAYS

## 2021-08-25 NOTE — CARE COORDINATION
Ambulatory Care Coordination Note  8/25/2021  CM Risk Score: 6  Charlson 10 Year Mortality Risk Score: 79%     ACC: Rand Duty    Summary Note: Caitlyn Self reports she is worried about her daughter and 1 year ld grand daughter. She reports they have COVID and are \"put up in a hotel\". She is looking for transportation to go see them. She reports she does not have money for a cab. She states she is not able to use the bus system. She has not completed her application for [employer]. She states otherwise she is doing \"good\". Plan:  Caitlyn Self to complete her [employer] application. F/U on blood sugars in 2 weeks. Will consider graduation from care coordination if no further needs. Care Coordination Interventions    Program Enrollment: Complex Care  Referral from Primary Care Provider: No  Suggested Interventions and Community Resources  Diabetes Education: Not Started  Grief Counselor: Not Started  [address]: Completed (Comment: [address])  Medi Set or Pill Pack: Not Started  Physical Therapy: Not Started  Registered Dietician: Not Started  Social Work: Completed  Transportation Support: In Process  Zone Management Tools: Completed (Comment: DM)         Goals Addressed    None         Prior to Admission medications    Medication Sig Start Date End Date Taking? Authorizing Provider   cetirizine (ZYRTEC) 10 MG tablet Take 1 tablet by mouth daily 8/9/21   Jose Macedo MD   insulin glargine (LANTUS SOLOSTAR) 100 UNIT/ML injection pen Inject 20 Units into the skin 2 times daily 8/9/21   Jose Macedo MD   pregabalin (LYRICA) 75 MG capsule Take 1 capsule by mouth 2 times daily for 30 days.  8/9/21 9/8/21  Jose Macedo MD   pantoprazole (PROTONIX) 20 MG tablet Take 1 tablet by mouth 2 times daily (before meals) 7/22/21   Cynthia Carrillo MD   metFORMIN (GLUCOPHAGE) 1000 MG tablet Take 1 tablet by mouth 2 times daily (with meals) 7/22/21   Cynthia Carrillo MD   venlafaxine (EFFEXOR XR) 150 MG extended release capsule Take 1 capsule by mouth daily (with breakfast) 7/22/21   Yesenia Vargas MD   dicyclomine (BENTYL) 10 MG capsule Take 1 capsule by mouth 4 times daily 7/12/21   Iron Penn MD   Misc.  Devices MISC 1 pair of dm shoes, 3 accommodated inserts 1/22/21   Sophronia Loges, DPM   Insulin Pen Needle ([employer] PEN NEEDLES 31G) 31G X 8 MM MISC 1 each by Does not apply route daily 1/4/21   Yesenia Vargas MD    MG capsule TAKE 1 CAPSULE BY MOUTH TWICE DAILY 12/9/20   Yesenia Vargas MD   traZODone (DESYREL) 150 MG tablet Take 1 tablet by mouth nightly 11/18/20   MD ELIZABETH GaffneySTYLE LITE strip 1 each by Does not apply route 3 times daily 11/11/20   Yesenia Vargas MD   FreeStyle Lancets MISC 1 each by Does not apply route 3 times daily 11/11/20   Yesenia Vargas MD   Alcohol Swabs (ALCOHOL PREP) 70 % PADS 1 each by Does not apply route as needed (use as needed) Use_____times per day  Diagnosis: 250.0   Diabetes ___Insulin-Dependent___Non-Insulin Dependent 11/11/20   Yesenia Vargas MD   albuterol sulfate  (90 Base) MCG/ACT inhaler Inhale 2 puffs into the lungs every 4 hours as needed for Wheezing 10/20/20 3/11/21  Yesenia Vargas MD   FLOVENT  MCG/ACT inhaler Inhale 2 puffs into the lungs 2 times daily 10/20/20   Yesenia Vargas MD   budesonide-formoterol (SYMBICORT) 80-4.5 MCG/ACT AERO Inhale 2 puffs into the lungs 2 times daily 10/20/20   Yesenia Vargas MD       Future Appointments   Date Time Provider Jj Hernandez   8/26/2021  8:30 AM MD Clarita Gaffney [facility]   9/9/2021  3:30 PM [facility] [address] [facility]

## 2021-08-27 ENCOUNTER — TELEPHONE (OUTPATIENT)
Dept: FAMILY MEDICINE CLINIC | Age: 56
End: 2021-08-27

## 2021-08-27 ENCOUNTER — CARE COORDINATION (OUTPATIENT)
Dept: CARE COORDINATION | Age: 56
End: 2021-08-27

## 2021-08-27 NOTE — TELEPHONE ENCOUNTER
Writer placed telephone call to patientment in attempts to reschedule missed appoint on 028/26/21 with Pavan Gibson. Voicemail message left to call office and reschedule. Message 2.

## 2021-08-27 NOTE — LETTER
[address]  [place] [address]  Phone: [phone]  Fax: [phone]            August 30, 2021     Kady Rodriguez. [address]      Dear Vanessa Garibay: We are sorry that you missed your appointment with Dr. Pepper Diaz 08/26/21. Your health and follow-up medical care are important to us. Please call our office as soon as possible so that we may reschedule your appointment. If you have already rescheduled your appointment, please disregard this letter. Sincerely,        Dr. Eduardo Medina. Akshat/MAREK

## 2021-09-08 ENCOUNTER — CARE COORDINATION (OUTPATIENT)
Dept: CARE COORDINATION | Age: 56
End: 2021-09-08

## 2021-09-08 PROBLEM — Z12.31 ENCOUNTER FOR SCREENING MAMMOGRAM FOR BREAST CANCER: Status: RESOLVED | Noted: 2017-11-28 | Resolved: 2021-09-08

## 2021-09-08 NOTE — CARE COORDINATION
Initial Contact Social Work Note - Ambulatory  9/8/2021        Identified Needs:   Transportation     Groceries       Social Work Plan:   [facility]. attempted to reach patient, there was no answer, mailbox was full.   Sam Murray    Next Steps: HC will attempt to reach out to patient again next week      Goals Addressed    None

## 2021-09-14 ENCOUNTER — HOSPITAL ENCOUNTER (INPATIENT)
Age: 56
LOS: 10 days | Discharge: NURSING, REHAB OR LONG-TERM CARE | DRG: 316 | End: 2021-09-24
Attending: EMERGENCY MEDICINE | Admitting: INTERNAL MEDICINE
Payer: COMMERCIAL

## 2021-09-14 ENCOUNTER — ANESTHESIA EVENT (OUTPATIENT)
Dept: OPERATING ROOM | Age: 56
DRG: 316 | End: 2021-09-14
Payer: COMMERCIAL

## 2021-09-14 ENCOUNTER — APPOINTMENT (OUTPATIENT)
Dept: GENERAL RADIOLOGY | Age: 56
DRG: 316 | End: 2021-09-14
Payer: COMMERCIAL

## 2021-09-14 ENCOUNTER — ANESTHESIA (OUTPATIENT)
Dept: OPERATING ROOM | Age: 56
DRG: 316 | End: 2021-09-14
Payer: COMMERCIAL

## 2021-09-14 VITALS
OXYGEN SATURATION: 100 % | DIASTOLIC BLOOD PRESSURE: 59 MMHG | SYSTOLIC BLOOD PRESSURE: 118 MMHG | RESPIRATION RATE: 22 BRPM

## 2021-09-14 DIAGNOSIS — L03.019 FELON OF FINGER: ICD-10-CM

## 2021-09-14 DIAGNOSIS — M65.9 FLEXOR TENOSYNOVITIS OF FINGER: ICD-10-CM

## 2021-09-14 DIAGNOSIS — M65.9 TENOSYNOVITIS OF FINGER: Primary | ICD-10-CM

## 2021-09-14 LAB
-: NORMAL
-: NORMAL
ABSOLUTE EOS #: <0.03 K/UL (ref 0–0.44)
ABSOLUTE IMMATURE GRANULOCYTE: 0.09 K/UL (ref 0–0.3)
ABSOLUTE LYMPH #: 0.85 K/UL (ref 1.1–3.7)
ABSOLUTE MONO #: 0.85 K/UL (ref 0.1–1.2)
ALBUMIN SERPL-MCNC: 3.7 G/DL (ref 3.5–5.2)
ALBUMIN/GLOBULIN RATIO: 1 (ref 1–2.5)
ALLEN TEST: ABNORMAL
ALP BLD-CCNC: 161 U/L (ref 35–104)
ALT SERPL-CCNC: 7 U/L (ref 5–33)
AMPHETAMINE SCREEN URINE: NEGATIVE
ANION GAP SERPL CALCULATED.3IONS-SCNC: 15 MMOL/L (ref 9–17)
ANION GAP SERPL CALCULATED.3IONS-SCNC: 18 MMOL/L (ref 9–17)
ANION GAP SERPL CALCULATED.3IONS-SCNC: 23 MMOL/L (ref 9–17)
AST SERPL-CCNC: 10 U/L
BARBITURATE SCREEN URINE: NEGATIVE
BASOPHILS # BLD: 0 % (ref 0–2)
BASOPHILS ABSOLUTE: 0.04 K/UL (ref 0–0.2)
BENZODIAZEPINE SCREEN, URINE: POSITIVE
BETA-HYDROXYBUTYRATE: 4.42 MMOL/L (ref 0.02–0.27)
BILIRUB SERPL-MCNC: 0.37 MG/DL (ref 0.3–1.2)
BILIRUBIN DIRECT: 0.15 MG/DL
BILIRUBIN, INDIRECT: 0.22 MG/DL (ref 0–1)
BUN BLDV-MCNC: 11 MG/DL (ref 6–20)
BUN/CREAT BLD: ABNORMAL (ref 9–20)
BUPRENORPHINE URINE: ABNORMAL
C-REACTIVE PROTEIN: 176.9 MG/L (ref 0–5)
CALCIUM SERPL-MCNC: 9 MG/DL (ref 8.6–10.4)
CALCIUM SERPL-MCNC: 9.1 MG/DL (ref 8.6–10.4)
CALCIUM SERPL-MCNC: 9.3 MG/DL (ref 8.6–10.4)
CANNABINOID SCREEN URINE: NEGATIVE
CARBOXYHEMOGLOBIN: 1 % (ref 0–5)
CHLORIDE BLD-SCNC: 87 MMOL/L (ref 98–107)
CHLORIDE BLD-SCNC: 95 MMOL/L (ref 98–107)
CHLORIDE BLD-SCNC: 98 MMOL/L (ref 98–107)
CHP ED QC CHECK: YES
CO2: 16 MMOL/L (ref 20–31)
CO2: 20 MMOL/L (ref 20–31)
CO2: 21 MMOL/L (ref 20–31)
COCAINE METABOLITE, URINE: POSITIVE
CREAT SERPL-MCNC: 0.99 MG/DL (ref 0.5–0.9)
CREAT SERPL-MCNC: 1.08 MG/DL (ref 0.5–0.9)
CREAT SERPL-MCNC: 1.09 MG/DL (ref 0.5–0.9)
DIFFERENTIAL TYPE: ABNORMAL
EOSINOPHILS RELATIVE PERCENT: 0 % (ref 1–4)
FIO2: ABNORMAL
GFR AFRICAN AMERICAN: >60 ML/MIN
GFR NON-AFRICAN AMERICAN: 52 ML/MIN
GFR NON-AFRICAN AMERICAN: 53 ML/MIN
GFR NON-AFRICAN AMERICAN: 58 ML/MIN
GFR SERPL CREATININE-BSD FRML MDRD: ABNORMAL ML/MIN/{1.73_M2}
GLOBULIN: ABNORMAL G/DL (ref 1.5–3.8)
GLUCOSE BLD-MCNC: 148 MG/DL (ref 65–105)
GLUCOSE BLD-MCNC: 152 MG/DL (ref 65–105)
GLUCOSE BLD-MCNC: 154 MG/DL (ref 65–105)
GLUCOSE BLD-MCNC: 163 MG/DL (ref 70–99)
GLUCOSE BLD-MCNC: 164 MG/DL (ref 65–105)
GLUCOSE BLD-MCNC: 172 MG/DL (ref 65–105)
GLUCOSE BLD-MCNC: 173 MG/DL (ref 65–105)
GLUCOSE BLD-MCNC: 185 MG/DL (ref 70–99)
GLUCOSE BLD-MCNC: 197 MG/DL (ref 65–105)
GLUCOSE BLD-MCNC: 211 MG/DL (ref 65–105)
GLUCOSE BLD-MCNC: 218 MG/DL (ref 65–105)
GLUCOSE BLD-MCNC: 256 MG/DL (ref 65–105)
GLUCOSE BLD-MCNC: 333 MG/DL (ref 65–105)
GLUCOSE BLD-MCNC: 574 MG/DL (ref 65–105)
GLUCOSE BLD-MCNC: 592 MG/DL (ref 65–105)
GLUCOSE BLD-MCNC: 719 MG/DL (ref 70–99)
GLUCOSE BLD-MCNC: >600 MG/DL (ref 65–105)
HAV IGM SER IA-ACNC: NONREACTIVE
HCO3 VENOUS: 24.6 MMOL/L (ref 24–30)
HCT VFR BLD CALC: 36.2 % (ref 36.3–47.1)
HEMOGLOBIN: 10.7 G/DL (ref 11.9–15.1)
HEPATITIS B CORE IGM ANTIBODY: NONREACTIVE
HEPATITIS B SURFACE ANTIGEN: NONREACTIVE
HEPATITIS C ANTIBODY: NONREACTIVE
IMMATURE GRANULOCYTES: 1 %
LACTIC ACID, WHOLE BLOOD: 2.7 MMOL/L (ref 0.7–2.1)
LACTIC ACID: ABNORMAL MMOL/L
LYMPHOCYTES # BLD: 9 % (ref 24–43)
MAGNESIUM: 1.8 MG/DL (ref 1.6–2.6)
MAGNESIUM: 1.9 MG/DL (ref 1.6–2.6)
MCH RBC QN AUTO: 31.5 PG (ref 25.2–33.5)
MCHC RBC AUTO-ENTMCNC: 29.6 G/DL (ref 28.4–34.8)
MCV RBC AUTO: 106.5 FL (ref 82.6–102.9)
MDMA URINE: ABNORMAL
METHADONE SCREEN, URINE: NEGATIVE
METHAMPHETAMINE, URINE: ABNORMAL
METHEMOGLOBIN: ABNORMAL % (ref 0–1.5)
MODE: ABNORMAL
MONOCYTES # BLD: 9 % (ref 3–12)
NEGATIVE BASE EXCESS, VEN: 2 MMOL/L (ref 0–2)
NOTIFICATION TIME: ABNORMAL
NOTIFICATION: ABNORMAL
NRBC AUTOMATED: 0 PER 100 WBC
O2 DEVICE/FLOW/%: ABNORMAL
O2 SAT, VEN: 23.2 % (ref 60–85)
OPIATES, URINE: NEGATIVE
OXYCODONE SCREEN URINE: POSITIVE
OXYHEMOGLOBIN: ABNORMAL % (ref 95–98)
PATIENT TEMP: 37
PCO2, VEN, TEMP ADJ: ABNORMAL MMHG (ref 39–55)
PCO2, VEN: 53.7 (ref 39–55)
PDW BLD-RTO: 13.2 % (ref 11.8–14.4)
PEEP/CPAP: ABNORMAL
PH VENOUS: 7.28 (ref 7.32–7.42)
PH, VEN, TEMP ADJ: ABNORMAL (ref 7.32–7.42)
PHENCYCLIDINE, URINE: NEGATIVE
PHOSPHORUS: 3 MG/DL (ref 2.6–4.5)
PHOSPHORUS: 3.8 MG/DL (ref 2.6–4.5)
PLATELET # BLD: 59 K/UL (ref 138–453)
PLATELET ESTIMATE: ABNORMAL
PMV BLD AUTO: 11.9 FL (ref 8.1–13.5)
PO2, VEN, TEMP ADJ: ABNORMAL MMHG (ref 30–50)
PO2, VEN: 20.1 (ref 30–50)
POSITIVE BASE EXCESS, VEN: ABNORMAL MMOL/L (ref 0–2)
POTASSIUM SERPL-SCNC: 3.8 MMOL/L (ref 3.7–5.3)
POTASSIUM SERPL-SCNC: 3.8 MMOL/L (ref 3.7–5.3)
POTASSIUM SERPL-SCNC: 4.9 MMOL/L (ref 3.7–5.3)
PROPOXYPHENE, URINE: ABNORMAL
PSV: ABNORMAL
PT. POSITION: ABNORMAL
RBC # BLD: 3.4 M/UL (ref 3.95–5.11)
RBC # BLD: ABNORMAL 10*6/UL
REASON FOR REJECTION: NORMAL
REASON FOR REJECTION: NORMAL
RESPIRATORY RATE: ABNORMAL
SAMPLE SITE: ABNORMAL
SARS-COV-2, RAPID: NOT DETECTED
SEDIMENTATION RATE, ERYTHROCYTE: 69 MM (ref 0–30)
SEG NEUTROPHILS: 81 % (ref 36–65)
SEGMENTED NEUTROPHILS ABSOLUTE COUNT: 7.59 K/UL (ref 1.5–8.1)
SET RATE: ABNORMAL
SODIUM BLD-SCNC: 126 MMOL/L (ref 135–144)
SODIUM BLD-SCNC: 133 MMOL/L (ref 135–144)
SODIUM BLD-SCNC: 134 MMOL/L (ref 135–144)
SPECIMEN DESCRIPTION: NORMAL
TEST INFORMATION: ABNORMAL
TEXT FOR RESPIRATORY: ABNORMAL
TOTAL CK: 71 U/L (ref 26–192)
TOTAL HB: ABNORMAL G/DL (ref 12–16)
TOTAL PROTEIN: 7.3 G/DL (ref 6.4–8.3)
TOTAL RATE: ABNORMAL
TRICYCLIC ANTIDEPRESSANTS, UR: ABNORMAL
VT: ABNORMAL
WBC # BLD: 9.4 K/UL (ref 3.5–11.3)
WBC # BLD: ABNORMAL 10*3/UL
ZZ NTE CLEAN UP: ORDERED TEST: NORMAL
ZZ NTE CLEAN UP: ORDERED TEST: NORMAL
ZZ NTE WITH NAME CLEAN UP: SPECIMEN SOURCE: NORMAL
ZZ NTE WITH NAME CLEAN UP: SPECIMEN SOURCE: NORMAL

## 2021-09-14 PROCEDURE — 6370000000 HC RX 637 (ALT 250 FOR IP): Performed by: STUDENT IN AN ORGANIZED HEALTH CARE EDUCATION/TRAINING PROGRAM

## 2021-09-14 PROCEDURE — 84100 ASSAY OF PHOSPHORUS: CPT

## 2021-09-14 PROCEDURE — 6360000002 HC RX W HCPCS: Performed by: PLASTIC SURGERY

## 2021-09-14 PROCEDURE — 6360000002 HC RX W HCPCS

## 2021-09-14 PROCEDURE — 96372 THER/PROPH/DIAG INJ SC/IM: CPT

## 2021-09-14 PROCEDURE — 82947 ASSAY GLUCOSE BLOOD QUANT: CPT

## 2021-09-14 PROCEDURE — 2580000003 HC RX 258: Performed by: PLASTIC SURGERY

## 2021-09-14 PROCEDURE — 6360000002 HC RX W HCPCS: Performed by: STUDENT IN AN ORGANIZED HEALTH CARE EDUCATION/TRAINING PROGRAM

## 2021-09-14 PROCEDURE — 73130 X-RAY EXAM OF HAND: CPT

## 2021-09-14 PROCEDURE — 2500000003 HC RX 250 WO HCPCS: Performed by: STUDENT IN AN ORGANIZED HEALTH CARE EDUCATION/TRAINING PROGRAM

## 2021-09-14 PROCEDURE — 3600000014 HC SURGERY LEVEL 4 ADDTL 15MIN: Performed by: PLASTIC SURGERY

## 2021-09-14 PROCEDURE — 83735 ASSAY OF MAGNESIUM: CPT

## 2021-09-14 PROCEDURE — 3600000004 HC SURGERY LEVEL 4 BASE: Performed by: PLASTIC SURGERY

## 2021-09-14 PROCEDURE — 0X9J0ZZ DRAINAGE OF RIGHT HAND, OPEN APPROACH: ICD-10-PCS | Performed by: PLASTIC SURGERY

## 2021-09-14 PROCEDURE — 87040 BLOOD CULTURE FOR BACTERIA: CPT

## 2021-09-14 PROCEDURE — 80076 HEPATIC FUNCTION PANEL: CPT

## 2021-09-14 PROCEDURE — 2580000003 HC RX 258

## 2021-09-14 PROCEDURE — 85652 RBC SED RATE AUTOMATED: CPT

## 2021-09-14 PROCEDURE — 99283 EMERGENCY DEPT VISIT LOW MDM: CPT

## 2021-09-14 PROCEDURE — 99223 1ST HOSP IP/OBS HIGH 75: CPT | Performed by: INTERNAL MEDICINE

## 2021-09-14 PROCEDURE — 2580000003 HC RX 258: Performed by: INTERNAL MEDICINE

## 2021-09-14 PROCEDURE — 2580000003 HC RX 258: Performed by: STUDENT IN AN ORGANIZED HEALTH CARE EDUCATION/TRAINING PROGRAM

## 2021-09-14 PROCEDURE — 83605 ASSAY OF LACTIC ACID: CPT

## 2021-09-14 PROCEDURE — 82805 BLOOD GASES W/O2 SATURATION: CPT

## 2021-09-14 PROCEDURE — 2500000003 HC RX 250 WO HCPCS: Performed by: PLASTIC SURGERY

## 2021-09-14 PROCEDURE — 6370000000 HC RX 637 (ALT 250 FOR IP): Performed by: PLASTIC SURGERY

## 2021-09-14 PROCEDURE — 90715 TDAP VACCINE 7 YRS/> IM: CPT | Performed by: STUDENT IN AN ORGANIZED HEALTH CARE EDUCATION/TRAINING PROGRAM

## 2021-09-14 PROCEDURE — 2500000003 HC RX 250 WO HCPCS

## 2021-09-14 PROCEDURE — 87635 SARS-COV-2 COVID-19 AMP PRB: CPT

## 2021-09-14 PROCEDURE — 6360000002 HC RX W HCPCS: Performed by: ANESTHESIOLOGY

## 2021-09-14 PROCEDURE — 3700000001 HC ADD 15 MINUTES (ANESTHESIA): Performed by: PLASTIC SURGERY

## 2021-09-14 PROCEDURE — 7100000001 HC PACU RECOVERY - ADDTL 15 MIN: Performed by: PLASTIC SURGERY

## 2021-09-14 PROCEDURE — 80307 DRUG TEST PRSMV CHEM ANLYZR: CPT

## 2021-09-14 PROCEDURE — 82550 ASSAY OF CK (CPK): CPT

## 2021-09-14 PROCEDURE — 6370000000 HC RX 637 (ALT 250 FOR IP)

## 2021-09-14 PROCEDURE — 87075 CULTR BACTERIA EXCEPT BLOOD: CPT

## 2021-09-14 PROCEDURE — 36415 COLL VENOUS BLD VENIPUNCTURE: CPT

## 2021-09-14 PROCEDURE — 87077 CULTURE AEROBIC IDENTIFY: CPT

## 2021-09-14 PROCEDURE — 86140 C-REACTIVE PROTEIN: CPT

## 2021-09-14 PROCEDURE — 2709999900 HC NON-CHARGEABLE SUPPLY: Performed by: PLASTIC SURGERY

## 2021-09-14 PROCEDURE — 2060000000 HC ICU INTERMEDIATE R&B

## 2021-09-14 PROCEDURE — 87070 CULTURE OTHR SPECIMN AEROBIC: CPT

## 2021-09-14 PROCEDURE — 7100000000 HC PACU RECOVERY - FIRST 15 MIN: Performed by: PLASTIC SURGERY

## 2021-09-14 PROCEDURE — 85025 COMPLETE CBC W/AUTO DIFF WBC: CPT

## 2021-09-14 PROCEDURE — 82010 KETONE BODYS QUAN: CPT

## 2021-09-14 PROCEDURE — 3700000000 HC ANESTHESIA ATTENDED CARE: Performed by: PLASTIC SURGERY

## 2021-09-14 PROCEDURE — 80074 ACUTE HEPATITIS PANEL: CPT

## 2021-09-14 PROCEDURE — 99254 IP/OBS CNSLTJ NEW/EST MOD 60: CPT | Performed by: INTERNAL MEDICINE

## 2021-09-14 PROCEDURE — 80048 BASIC METABOLIC PNL TOTAL CA: CPT

## 2021-09-14 PROCEDURE — 6360000002 HC RX W HCPCS: Performed by: INTERNAL MEDICINE

## 2021-09-14 PROCEDURE — 90471 IMMUNIZATION ADMIN: CPT | Performed by: STUDENT IN AN ORGANIZED HEALTH CARE EDUCATION/TRAINING PROGRAM

## 2021-09-14 PROCEDURE — 87205 SMEAR GRAM STAIN: CPT

## 2021-09-14 PROCEDURE — 87106 FUNGI IDENTIFICATION YEAST: CPT

## 2021-09-14 RX ORDER — SODIUM CHLORIDE 0.9 % (FLUSH) 0.9 %
5-40 SYRINGE (ML) INJECTION PRN
Status: DISCONTINUED | OUTPATIENT
Start: 2021-09-14 | End: 2021-09-24 | Stop reason: HOSPADM

## 2021-09-14 RX ORDER — NICOTINE POLACRILEX 4 MG
15 LOZENGE BUCCAL PRN
Status: DISCONTINUED | OUTPATIENT
Start: 2021-09-14 | End: 2021-09-24 | Stop reason: HOSPADM

## 2021-09-14 RX ORDER — DEXTROSE MONOHYDRATE 25 G/50ML
12.5 INJECTION, SOLUTION INTRAVENOUS PRN
Status: DISCONTINUED | OUTPATIENT
Start: 2021-09-14 | End: 2021-09-24 | Stop reason: HOSPADM

## 2021-09-14 RX ORDER — ACETAMINOPHEN 650 MG/1
650 SUPPOSITORY RECTAL EVERY 6 HOURS PRN
Status: DISCONTINUED | OUTPATIENT
Start: 2021-09-14 | End: 2021-09-24 | Stop reason: HOSPADM

## 2021-09-14 RX ORDER — SODIUM CHLORIDE 0.9 % (FLUSH) 0.9 %
5-40 SYRINGE (ML) INJECTION EVERY 12 HOURS SCHEDULED
Status: DISCONTINUED | OUTPATIENT
Start: 2021-09-14 | End: 2021-09-24 | Stop reason: HOSPADM

## 2021-09-14 RX ORDER — ACETAMINOPHEN 500 MG
1000 TABLET ORAL ONCE
Status: COMPLETED | OUTPATIENT
Start: 2021-09-14 | End: 2021-09-14

## 2021-09-14 RX ORDER — ONDANSETRON 4 MG/1
4 TABLET, ORALLY DISINTEGRATING ORAL EVERY 8 HOURS PRN
Status: DISCONTINUED | OUTPATIENT
Start: 2021-09-14 | End: 2021-09-24 | Stop reason: HOSPADM

## 2021-09-14 RX ORDER — FLUTICASONE PROPIONATE 110 UG/1
2 AEROSOL, METERED RESPIRATORY (INHALATION) 2 TIMES DAILY
Status: DISCONTINUED | OUTPATIENT
Start: 2021-09-14 | End: 2021-09-14 | Stop reason: SDUPTHER

## 2021-09-14 RX ORDER — LIDOCAINE HYDROCHLORIDE 10 MG/ML
INJECTION, SOLUTION EPIDURAL; INFILTRATION; INTRACAUDAL; PERINEURAL PRN
Status: DISCONTINUED | OUTPATIENT
Start: 2021-09-14 | End: 2021-09-14 | Stop reason: ALTCHOICE

## 2021-09-14 RX ORDER — VANCOMYCIN HYDROCHLORIDE 1 G/200ML
1000 INJECTION, SOLUTION INTRAVENOUS EVERY 12 HOURS
Status: DISCONTINUED | OUTPATIENT
Start: 2021-09-14 | End: 2021-09-14

## 2021-09-14 RX ORDER — ACETAMINOPHEN 325 MG/1
650 TABLET ORAL EVERY 6 HOURS PRN
Status: DISCONTINUED | OUTPATIENT
Start: 2021-09-14 | End: 2021-09-24 | Stop reason: HOSPADM

## 2021-09-14 RX ORDER — FENTANYL CITRATE 50 UG/ML
INJECTION, SOLUTION INTRAMUSCULAR; INTRAVENOUS PRN
Status: DISCONTINUED | OUTPATIENT
Start: 2021-09-14 | End: 2021-09-14 | Stop reason: SDUPTHER

## 2021-09-14 RX ORDER — BUDESONIDE AND FORMOTEROL FUMARATE DIHYDRATE 80; 4.5 UG/1; UG/1
2 AEROSOL RESPIRATORY (INHALATION) 2 TIMES DAILY
Status: DISCONTINUED | OUTPATIENT
Start: 2021-09-14 | End: 2021-09-24 | Stop reason: HOSPADM

## 2021-09-14 RX ORDER — SODIUM CHLORIDE 9 MG/ML
25 INJECTION, SOLUTION INTRAVENOUS PRN
Status: DISCONTINUED | OUTPATIENT
Start: 2021-09-14 | End: 2021-09-24 | Stop reason: HOSPADM

## 2021-09-14 RX ORDER — OXYCODONE HYDROCHLORIDE 5 MG/1
5 TABLET ORAL EVERY 4 HOURS PRN
Status: DISCONTINUED | OUTPATIENT
Start: 2021-09-14 | End: 2021-09-24 | Stop reason: HOSPADM

## 2021-09-14 RX ORDER — VENLAFAXINE HYDROCHLORIDE 150 MG/1
150 CAPSULE, EXTENDED RELEASE ORAL
Status: DISCONTINUED | OUTPATIENT
Start: 2021-09-15 | End: 2021-09-24 | Stop reason: HOSPADM

## 2021-09-14 RX ORDER — MAGNESIUM SULFATE 1 G/100ML
1000 INJECTION INTRAVENOUS PRN
Status: DISCONTINUED | OUTPATIENT
Start: 2021-09-14 | End: 2021-09-16

## 2021-09-14 RX ORDER — MIDAZOLAM HYDROCHLORIDE 1 MG/ML
INJECTION INTRAMUSCULAR; INTRAVENOUS PRN
Status: DISCONTINUED | OUTPATIENT
Start: 2021-09-14 | End: 2021-09-14 | Stop reason: SDUPTHER

## 2021-09-14 RX ORDER — FENTANYL CITRATE 50 UG/ML
25 INJECTION, SOLUTION INTRAMUSCULAR; INTRAVENOUS EVERY 5 MIN PRN
Status: DISCONTINUED | OUTPATIENT
Start: 2021-09-14 | End: 2021-09-14

## 2021-09-14 RX ORDER — ONDANSETRON 2 MG/ML
4 INJECTION INTRAMUSCULAR; INTRAVENOUS EVERY 6 HOURS PRN
Status: DISCONTINUED | OUTPATIENT
Start: 2021-09-14 | End: 2021-09-24 | Stop reason: HOSPADM

## 2021-09-14 RX ORDER — PANTOPRAZOLE SODIUM 20 MG/1
20 TABLET, DELAYED RELEASE ORAL
Status: DISCONTINUED | OUTPATIENT
Start: 2021-09-14 | End: 2021-09-24 | Stop reason: HOSPADM

## 2021-09-14 RX ORDER — ALBUTEROL SULFATE 90 UG/1
2 AEROSOL, METERED RESPIRATORY (INHALATION) EVERY 4 HOURS PRN
Status: DISCONTINUED | OUTPATIENT
Start: 2021-09-14 | End: 2021-09-24 | Stop reason: HOSPADM

## 2021-09-14 RX ORDER — PROPOFOL 10 MG/ML
INJECTION, EMULSION INTRAVENOUS PRN
Status: DISCONTINUED | OUTPATIENT
Start: 2021-09-14 | End: 2021-09-14 | Stop reason: SDUPTHER

## 2021-09-14 RX ORDER — LIDOCAINE HYDROCHLORIDE 10 MG/ML
INJECTION, SOLUTION EPIDURAL; INFILTRATION; INTRACAUDAL; PERINEURAL PRN
Status: DISCONTINUED | OUTPATIENT
Start: 2021-09-14 | End: 2021-09-14 | Stop reason: SDUPTHER

## 2021-09-14 RX ORDER — POTASSIUM CHLORIDE 7.45 MG/ML
10 INJECTION INTRAVENOUS PRN
Status: DISCONTINUED | OUTPATIENT
Start: 2021-09-14 | End: 2021-09-16

## 2021-09-14 RX ORDER — MAGNESIUM HYDROXIDE 1200 MG/15ML
LIQUID ORAL CONTINUOUS PRN
Status: COMPLETED | OUTPATIENT
Start: 2021-09-14 | End: 2021-09-14

## 2021-09-14 RX ORDER — 0.9 % SODIUM CHLORIDE 0.9 %
1000 INTRAVENOUS SOLUTION INTRAVENOUS ONCE
Status: DISCONTINUED | OUTPATIENT
Start: 2021-09-14 | End: 2021-09-24 | Stop reason: HOSPADM

## 2021-09-14 RX ORDER — FENTANYL CITRATE 50 UG/ML
50 INJECTION, SOLUTION INTRAMUSCULAR; INTRAVENOUS EVERY 5 MIN PRN
Status: DISCONTINUED | OUTPATIENT
Start: 2021-09-14 | End: 2021-09-14

## 2021-09-14 RX ORDER — KETAMINE HYDROCHLORIDE 10 MG/ML
INJECTION, SOLUTION INTRAMUSCULAR; INTRAVENOUS PRN
Status: DISCONTINUED | OUTPATIENT
Start: 2021-09-14 | End: 2021-09-14 | Stop reason: SDUPTHER

## 2021-09-14 RX ORDER — ALPRAZOLAM 0.25 MG/1
0.25 TABLET ORAL ONCE
Status: DISCONTINUED | OUTPATIENT
Start: 2021-09-14 | End: 2021-09-15

## 2021-09-14 RX ORDER — SODIUM CHLORIDE 450 MG/100ML
INJECTION, SOLUTION INTRAVENOUS CONTINUOUS
Status: DISCONTINUED | OUTPATIENT
Start: 2021-09-14 | End: 2021-09-15

## 2021-09-14 RX ORDER — POLYETHYLENE GLYCOL 3350 17 G/17G
17 POWDER, FOR SOLUTION ORAL DAILY PRN
Status: DISCONTINUED | OUTPATIENT
Start: 2021-09-14 | End: 2021-09-24 | Stop reason: HOSPADM

## 2021-09-14 RX ORDER — DEXTROSE MONOHYDRATE 50 MG/ML
100 INJECTION, SOLUTION INTRAVENOUS PRN
Status: DISCONTINUED | OUTPATIENT
Start: 2021-09-14 | End: 2021-09-24 | Stop reason: HOSPADM

## 2021-09-14 RX ORDER — LIDOCAINE HYDROCHLORIDE 10 MG/ML
20 INJECTION, SOLUTION INFILTRATION; PERINEURAL ONCE
Status: COMPLETED | OUTPATIENT
Start: 2021-09-14 | End: 2021-09-14

## 2021-09-14 RX ORDER — PREGABALIN 75 MG/1
75 CAPSULE ORAL 2 TIMES DAILY
Status: DISCONTINUED | OUTPATIENT
Start: 2021-09-14 | End: 2021-09-22

## 2021-09-14 RX ORDER — DEXTROSE AND SODIUM CHLORIDE 5; .45 G/100ML; G/100ML
INJECTION, SOLUTION INTRAVENOUS CONTINUOUS
Status: DISCONTINUED | OUTPATIENT
Start: 2021-09-14 | End: 2021-09-15

## 2021-09-14 RX ORDER — DEXTROSE AND SODIUM CHLORIDE 5; .45 G/100ML; G/100ML
INJECTION, SOLUTION INTRAVENOUS CONTINUOUS PRN
Status: DISCONTINUED | OUTPATIENT
Start: 2021-09-14 | End: 2021-09-15

## 2021-09-14 RX ORDER — SODIUM CHLORIDE 9 MG/ML
INJECTION, SOLUTION INTRAVENOUS CONTINUOUS PRN
Status: DISCONTINUED | OUTPATIENT
Start: 2021-09-14 | End: 2021-09-14 | Stop reason: SDUPTHER

## 2021-09-14 RX ADMIN — DEXTROSE AND SODIUM CHLORIDE: 5; 450 INJECTION, SOLUTION INTRAVENOUS at 14:15

## 2021-09-14 RX ADMIN — LIDOCAINE HYDROCHLORIDE 20 ML: 10 INJECTION, SOLUTION INFILTRATION; PERINEURAL at 08:09

## 2021-09-14 RX ADMIN — KETAMINE HYDROCHLORIDE 20 MG: 10 INJECTION INTRAMUSCULAR; INTRAVENOUS at 09:34

## 2021-09-14 RX ADMIN — FENTANYL CITRATE 50 MCG: 50 INJECTION, SOLUTION INTRAMUSCULAR; INTRAVENOUS at 11:25

## 2021-09-14 RX ADMIN — KETAMINE HYDROCHLORIDE 50 MG: 10 INJECTION INTRAMUSCULAR; INTRAVENOUS at 09:15

## 2021-09-14 RX ADMIN — FENTANYL CITRATE 50 MCG: 50 INJECTION, SOLUTION INTRAMUSCULAR; INTRAVENOUS at 09:41

## 2021-09-14 RX ADMIN — OXYCODONE HYDROCHLORIDE 5 MG: 5 TABLET ORAL at 17:15

## 2021-09-14 RX ADMIN — POTASSIUM CHLORIDE 10 MEQ: 7.46 INJECTION, SOLUTION INTRAVENOUS at 17:15

## 2021-09-14 RX ADMIN — DEXTROSE AND SODIUM CHLORIDE: 5; 450 INJECTION, SOLUTION INTRAVENOUS at 20:43

## 2021-09-14 RX ADMIN — ACETAMINOPHEN 650 MG: 325 TABLET ORAL at 17:15

## 2021-09-14 RX ADMIN — SODIUM CHLORIDE: 9 INJECTION, SOLUTION INTRAVENOUS at 09:09

## 2021-09-14 RX ADMIN — KETAMINE HYDROCHLORIDE 10 MG: 10 INJECTION INTRAMUSCULAR; INTRAVENOUS at 09:19

## 2021-09-14 RX ADMIN — DEXTROSE AND SODIUM CHLORIDE: 5; 450 INJECTION, SOLUTION INTRAVENOUS at 22:48

## 2021-09-14 RX ADMIN — PREGABALIN 75 MG: 75 CAPSULE ORAL at 21:30

## 2021-09-14 RX ADMIN — ACETAMINOPHEN 1000 MG: 500 TABLET ORAL at 07:48

## 2021-09-14 RX ADMIN — PANTOPRAZOLE SODIUM 20 MG: 20 TABLET, DELAYED RELEASE ORAL at 16:10

## 2021-09-14 RX ADMIN — KETAMINE HYDROCHLORIDE 10 MG: 10 INJECTION INTRAMUSCULAR; INTRAVENOUS at 09:40

## 2021-09-14 RX ADMIN — SODIUM CHLORIDE: 4.5 INJECTION, SOLUTION INTRAVENOUS at 13:15

## 2021-09-14 RX ADMIN — INSULIN HUMAN 5 UNITS: 100 INJECTION, SOLUTION PARENTERAL at 09:45

## 2021-09-14 RX ADMIN — LIDOCAINE HYDROCHLORIDE 50 MG: 10 INJECTION, SOLUTION EPIDURAL; INFILTRATION; INTRACAUDAL; PERINEURAL at 09:15

## 2021-09-14 RX ADMIN — KETAMINE HYDROCHLORIDE 10 MG: 10 INJECTION INTRAMUSCULAR; INTRAVENOUS at 09:24

## 2021-09-14 RX ADMIN — DAPTOMYCIN 235 MG: 500 INJECTION, POWDER, LYOPHILIZED, FOR SOLUTION INTRAVENOUS at 23:12

## 2021-09-14 RX ADMIN — MIDAZOLAM HYDROCHLORIDE 2 MG: 1 INJECTION, SOLUTION INTRAMUSCULAR; INTRAVENOUS at 09:12

## 2021-09-14 RX ADMIN — POTASSIUM CHLORIDE 10 MEQ: 7.46 INJECTION, SOLUTION INTRAVENOUS at 18:20

## 2021-09-14 RX ADMIN — PROPOFOL 200 MG: 10 INJECTION, EMULSION INTRAVENOUS at 09:15

## 2021-09-14 RX ADMIN — FENTANYL CITRATE 50 MCG: 50 INJECTION, SOLUTION INTRAMUSCULAR; INTRAVENOUS at 09:53

## 2021-09-14 RX ADMIN — OXYCODONE HYDROCHLORIDE 5 MG: 5 TABLET ORAL at 13:15

## 2021-09-14 RX ADMIN — PIPERACILLIN AND TAZOBACTAM 3375 MG: 3; .375 INJECTION, POWDER, LYOPHILIZED, FOR SOLUTION INTRAVENOUS at 10:57

## 2021-09-14 RX ADMIN — FENTANYL CITRATE 50 MCG: 50 INJECTION, SOLUTION INTRAMUSCULAR; INTRAVENOUS at 11:10

## 2021-09-14 RX ADMIN — INSULIN LISPRO 10 UNITS: 100 INJECTION, SOLUTION INTRAVENOUS; SUBCUTANEOUS at 08:07

## 2021-09-14 RX ADMIN — Medication 1.5 G: at 09:21

## 2021-09-14 RX ADMIN — TRAZODONE HYDROCHLORIDE 150 MG: 100 TABLET ORAL at 20:47

## 2021-09-14 RX ADMIN — TETANUS TOXOID, REDUCED DIPHTHERIA TOXOID AND ACELLULAR PERTUSSIS VACCINE, ADSORBED 0.5 ML: 5; 2.5; 8; 8; 2.5 SUSPENSION INTRAMUSCULAR at 05:52

## 2021-09-14 RX ADMIN — POTASSIUM CHLORIDE 10 MEQ: 7.46 INJECTION, SOLUTION INTRAVENOUS at 19:49

## 2021-09-14 ASSESSMENT — PULMONARY FUNCTION TESTS
PIF_VALUE: 1
PIF_VALUE: -14
PIF_VALUE: 1
PIF_VALUE: 0
PIF_VALUE: 1

## 2021-09-14 ASSESSMENT — PAIN SCALES - GENERAL
PAINLEVEL_OUTOF10: 10
PAINLEVEL_OUTOF10: 0
PAINLEVEL_OUTOF10: 10

## 2021-09-14 ASSESSMENT — PAIN DESCRIPTION - PROGRESSION
CLINICAL_PROGRESSION: GRADUALLY WORSENING

## 2021-09-14 ASSESSMENT — ENCOUNTER SYMPTOMS
APNEA: 0
PHOTOPHOBIA: 0
COUGH: 0
SORE THROAT: 0
SHORTNESS OF BREATH: 0
COLOR CHANGE: 1
ABDOMINAL DISTENTION: 0
BACK PAIN: 0
NAUSEA: 0
VOMITING: 0
ABDOMINAL PAIN: 0

## 2021-09-14 ASSESSMENT — PAIN DESCRIPTION - FREQUENCY
FREQUENCY: CONTINUOUS
FREQUENCY: CONTINUOUS

## 2021-09-14 ASSESSMENT — PAIN DESCRIPTION - PAIN TYPE
TYPE: SURGICAL PAIN
TYPE: ACUTE PAIN
TYPE: ACUTE PAIN;SURGICAL PAIN

## 2021-09-14 ASSESSMENT — PAIN DESCRIPTION - ORIENTATION
ORIENTATION: RIGHT

## 2021-09-14 ASSESSMENT — PAIN DESCRIPTION - LOCATION
LOCATION: HAND;FINGER (COMMENT WHICH ONE)
LOCATION: FINGER (COMMENT WHICH ONE)
LOCATION: HAND

## 2021-09-14 ASSESSMENT — PAIN DESCRIPTION - DESCRIPTORS
DESCRIPTORS: SHARP;CONSTANT
DESCRIPTORS: ACHING

## 2021-09-14 ASSESSMENT — PAIN DESCRIPTION - ONSET: ONSET: ON-GOING

## 2021-09-14 NOTE — CARE COORDINATION
Case Management Initial Discharge Plan  Terri Palmer,             Met with:patient to discuss discharge plans. Information verified: address, contacts, phone number, , insurance Yes  Insurance Provider: [address] [place] [address]    Emergency Contact/Next of Anup Campbell name & number: daughter [phone]  Who are involved in patient's support system? daughter    PCP: Janice Wade MD  Date of last visit: month ago      Discharge Planning    Living Arrangements:  Alone     Home-4th level apartment with elevator    Patient able to perform ADL's:Independent    Current Services (outpatient & in home) none  DME equipment: N/A  DME provider:     Is patient receiving oral anticoagulation therapy? No        Potential Assistance Needed:  Transportation    Patient agreeable to home care: No  [place] of choice provided:  n/a    Prior SNF/Rehab Placement and Facility: N/A  Agreeable to SNF/Rehab: No  [place] of choice provided: n/a     Evaluation: n/a    Expected Discharge date:  21    Patient expects to be discharged to:   home    If home: is the family and/or caregiver wiling & able to provide support at home? ? Who will be providing this support? daughter*    Follow Up Appointment: Best Day/ Time:      Transportation provider: needs [employer] cab  Transportation arrangements needed for discharge: Yes    Readmission Risk              Risk of Unplanned Readmission:  27             Does patient have a readmission risk score greater than 14?: Yes  If yes, follow-up appointment must be made within 7 days of discharge.      Goals of Care: blood sugar under control      Educated patient on transitional options, provided freedom of choice and are agreeable with plan      Discharge Plan: home alone    Federico          Electronically signed by Riki Trinidad RN on 21 at 12:55 PM EDT

## 2021-09-14 NOTE — ED NOTES
Multiple unsuccessful attempts at IVs by 2 nurses and dr Derrick Sears. Pt states she wants to wait until picc team comes in the morning. Pt understands she will need to be admitted. Pt refusing any more attempts.      Crystal Peña, RN  09/14/21 [id number]

## 2021-09-14 NOTE — ED PROVIDER NOTES
[facility] [address]  ED  Emergency Department Encounter  EmergencyMedicine Resident     Pt Name:Terri Smith  MRN: [id number]  Sheagfmima 1965  Date of evaluation: 9/14/21  PCP:  Amadeo Caballero MD    This patient was evaluated in the Emergency Department for symptoms described in the history of present illness. The patient was evaluated in the context of the global COVID-19 pandemic, which necessitated consideration that the patient might be at risk for infection with the SARS-CoV-2 virus that causes COVID-19. Institutional protocols and algorithms that pertain to the evaluation of patients at risk for COVID-19 are in a state of rapid change based on information released by regulatory bodies including the CDC and federal and state organizations. These policies and algorithms were followed during the patient's care in the ED. CHIEF COMPLAINT       Hand pain    HISTORY OF PRESENT ILLNESS  (Location/Symptom, Timing/Onset, Context/Setting, Quality, Duration, Modifying Factors, Severity.)      Brandon Haney is a 49 y.o. female who presents with unknown duration of right index finger swelling and pain, patient states this is from a fall earlier today denying LOC or head injury, however discoloration on her hands suggest that the swelling and erythema has been around for multiple days. Patient does have a history of cocaine abuse, bipolar disorder, however does have limited history as patient appears to be confused with regards to the dates. ANO x4, GCS 15 at this time. Patient has a history of diabetes.     PAST MEDICAL / SURGICAL / SOCIAL / FAMILY HISTORY      has a past medical history of Acid reflux, Acute cystitis with hematuria, Acute non-recurrent maxillary sinusitis, Asthma, Bipolar 1 disorder ([address] Bipolar disorder, mixed ([address] BMI 34.0-34.9,adult, Cannabis use disorder, severe, dependence ([address] Cerebrovascular accident (CVA) ([address] Chest pain, Chronic renal insufficiency, Cocaine abuse ([facility] COVID-19 virus RNA test result unknown, DDD (degenerative disc disease), cervical, Diabetes mellitus ([facility] Dizziness, Fibromyalgia, History of stroke, Homicidal ideation, Hyperglycemia, Hypertension, Hypotension, IDDM (insulin dependent diabetes mellitus), Lupus ([facility] Migraine, Neuropathy, Neuropathy, Polysubstance abuse ([facility] Post traumatic stress disorder (PTSD), Posttraumatic stress disorder, Recurrent depression ([facility] Recurrent major depressive disorder, in partial remission ([facility] Screening mammogram, encounter for, Severe recurrent major depression with psychotic features ([facility] Severe recurrent major depression without psychotic features ([facility] Stroke (cerebrum) ([facility] Stroke ([facility] Suicidal ideation, Suicidal intent, Vitamin D deficiency, and White matter changes. has a past surgical history that includes Abscess Drainage; chest tube insertion; Abdomen surgery; Cataract removal with implant (Bilateral); LASIK (Bilateral); amputation (Left, 03/13/2021); and Finger amputation (Left, 3/13/2021).     Social History     Socioeconomic History    Marital status: Legally      Spouse name: Not on file    Number of children: Not on file    Years of education: Not on file    Highest education level: Not on file   Occupational History    Not on file   Tobacco Use    Smoking status: Never Smoker    Smokeless tobacco: Never Used   Vaping Use    Vaping Use: Never used   Substance and Sexual Activity    Alcohol use: Not Currently    Drug use: Yes     Types: Marijuana, Cocaine     Comment: + Cocaine 2/2021 also, see Care Everywhere UDS    Sexual activity: Not Currently     Partners: Male   Other Topics Concern    Not on file   Social History Narrative    Not on file     Social Determinants of Health     Financial Resource Strain: High Risk    Difficulty of Paying Living Expenses: Hard   Food Insecurity: Food Insecurity Present    Worried About Running Out of Food in the Last Year: Often true    Ran Out of Food in the Last Year: Often true   Transportation Needs: Unmet Transportation Needs    Lack of Transportation (Medical): Yes    Lack of Transportation (Non-Medical): Yes   Physical Activity: Inactive    Days of Exercise per Week: 0 days    Minutes of Exercise per Session: 0 min   Stress: Stress Concern Present    Feeling of Stress : Rather much   Social Connections:     Frequency of Communication with Friends and Family:     Frequency of Social Gatherings with Friends and Family:     Attends Oriental orthodox Services:     Active Member of Clubs or Organizations:     Attends Club or Organization Meetings:     Marital Status:    Intimate Partner Violence:     Fear of Current or Ex-Partner:     Emotionally Abused:     Physically Abused:     Sexually Abused:        Family History   Problem Relation Age of Onset    Diabetes Mother     Stroke Mother     Diabetes Father     Diabetes Sister     Diabetes Brother     Other Son         GSW    No Known Problems Sister        Allergies:  Bactrim [sulfamethoxazole-trimethoprim] and Adhesive tape    Home Medications:  Prior to Admission medications    Medication Sig Start Date End Date Taking? Authorizing Provider   cetirizine (ZYRTEC) 10 MG tablet Take 1 tablet by mouth daily 8/9/21   Jose Macedo MD   insulin glargine (LANTUS SOLOSTAR) 100 UNIT/ML injection pen Inject 20 Units into the skin 2 times daily 8/9/21   Jose Macedo MD   pregabalin (LYRICA) 75 MG capsule Take 1 capsule by mouth 2 times daily for 30 days.  8/9/21 9/8/21  Audrey Snider MD   pantoprazole (PROTONIX) 20 MG tablet Take 1 tablet by mouth 2 times daily (before meals) 7/22/21   Pan Velásquez MD   metFORMIN (GLUCOPHAGE) 1000 MG tablet Take 1 tablet by mouth 2 times daily (with meals) 7/22/21   Pan Velásquez MD   venlafaxine (EFFEXOR XR) 150 MG extended release capsule Take 1 capsule by mouth daily (with breakfast) 7/22/21   Pan Velásquez MD   dicyclomine (BENTYL) 10 MG capsule Take 1 capsule by mouth 4 times daily 7/12/21   Ami Herrera MD   Misc. Devices MISC 1 pair of dm shoes, 3 accommodated inserts 1/22/21   Sandro Dunham DPM   Insulin Pen Needle ([employer] PEN NEEDLES 31G) 31G X 8 MM MISC 1 each by Does not apply route daily 1/4/21   Narinder Youssef MD    MG capsule TAKE 1 CAPSULE BY MOUTH TWICE DAILY 12/9/20   Narinder Youssef MD   traZODone (DESYREL) 150 MG tablet Take 1 tablet by mouth nightly 11/18/20   Narinder Youssef MD   FREESTYLE LITE strip 1 each by Does not apply route 3 times daily 11/11/20   Narinder Youssef MD   FreeStyle Lancets MISC 1 each by Does not apply route 3 times daily 11/11/20   Narinder Youssef MD   Alcohol Swabs (ALCOHOL PREP) 70 % PADS 1 each by Does not apply route as needed (use as needed) Use_____times per day  Diagnosis: 250.0   Diabetes ___Insulin-Dependent___Non-Insulin Dependent 11/11/20   Narinder Youssef MD   albuterol sulfate  (90 Base) MCG/ACT inhaler Inhale 2 puffs into the lungs every 4 hours as needed for Wheezing 10/20/20 3/11/21  Narinder Youssef MD   FLOVENT  MCG/ACT inhaler Inhale 2 puffs into the lungs 2 times daily 10/20/20   Narinder Youssef MD   budesonide-formoterol (SYMBICORT) 80-4.5 MCG/ACT AERO Inhale 2 puffs into the lungs 2 times daily 10/20/20   Narinder Youssef MD       REVIEW OF SYSTEMS    (2-9 systems for level 4, 10 or more for level 5)      Review of Systems   Constitutional: Negative for chills and fever. HENT: Negative for sore throat. Eyes: Negative for visual disturbance. Respiratory: Negative for cough and shortness of breath. Cardiovascular: Negative for chest pain and palpitations. Gastrointestinal: Negative for abdominal pain, nausea and vomiting. Endocrine: Negative for polyuria. Genitourinary: Negative for dysuria and hematuria. Musculoskeletal: Negative for back pain. Skin: Negative for rash.    Allergic/Immunologic: Negative for food allergies. Neurological: Negative for light-headedness and headaches. Psychiatric/Behavioral: Negative for confusion. PHYSICAL EXAM   (up to 7 for level 4, 8 or more for level 5)      INITIAL VITALS:   BP (!) 175/123   Pulse 98   Temp 98.5 °F (36.9 °C)   Resp 18   Wt 238 lb (108 kg)   LMP  (LMP Unknown)   SpO2 95%   BMI 38.41 kg/m²     Physical Exam  Constitutional:       Appearance: Normal appearance. HENT:      Head: Normocephalic and atraumatic. Right Ear: Tympanic membrane normal.      Left Ear: Tympanic membrane normal.      Nose: Nose normal.      Mouth/Throat:      Mouth: Mucous membranes are moist.      Pharynx: Oropharynx is clear. Eyes:      Extraocular Movements: Extraocular movements intact. Pupils: Pupils are equal, round, and reactive to light. Cardiovascular:      Rate and Rhythm: Normal rate and regular rhythm. Pulmonary:      Effort: Pulmonary effort is normal.      Breath sounds: Normal breath sounds. Abdominal:      Palpations: Abdomen is soft. Tenderness: There is no abdominal tenderness. There is no right CVA tenderness or left CVA tenderness. Musculoskeletal:      Cervical back: Normal range of motion and neck supple. No tenderness. Right lower leg: No edema. Left lower leg: No edema. Skin:     General: Skin is warm. Capillary Refill: Capillary refill takes less than 2 seconds. Neurological:      General: No focal deficit present. Mental Status: She is alert and oriented to person, place, and time. Mental status is at baseline.    Psychiatric:         Mood and Affect: Mood normal.         DIFFERENTIAL  DIAGNOSIS     PLAN (LABS / IMAGING / EKG):  Orders Placed This Encounter   Procedures    XR HAND RIGHT (MIN 3 VIEWS)    CBC WITH AUTO DIFFERENTIAL    BASIC METABOLIC PANEL    Sedimentation Rate    C-REACTIVE PROTEIN    BLOOD GAS, VENOUS    Beta-Hydroxybutyrate    Inpatient consult to Plastic Surgery   [facility] Inpatient consult to IV Team    POCT Glucose    POC Blood Gas and Chemistry     MEDICATIONS ORDERED:  Orders Placed This Encounter   Medications    Tetanus-Diphth-Acell Pertussis (BOOSTRIX) injection 0.5 mL    vancomycin (VANCOCIN) 1500 mg in dextrose 5 % 250 mL IVPB     Order Specific Question:   Antimicrobial Indications     Answer:   Skin and Soft Tissue Infection    piperacillin-tazobactam (ZOSYN) 3,375 mg in dextrose 5 % 50 mL IVPB (mini-bag)     Order Specific Question:   Antimicrobial Indications     Answer:   Skin and Soft Tissue Infection    acetaminophen (TYLENOL) tablet 1,000 mg    0.9 % sodium chloride bolus    potassium bicarb-citric acid (EFFER-K) effervescent tablet 40 mEq    insulin lispro (HUMALOG) injection vial 10 Units       DDX: Tenosynovitis, abscess, cellulitis, osteomyelitis, fracture, sprain    DIAGNOSTIC RESULTS / EMERGENCY DEPARTMENT COURSE / MDM   LAB RESULTS:  Results for orders placed or performed during the hospital encounter of 09/14/21   POCT Glucose   Result Value Ref Range    QC OK? yes        IMPRESSION: 30-year-old lady presents to the emergency department with unknown duration of right index finger swelling, erythema extending down to palmar surface of hand resembling tenosynovitis. Patient does have fusiform swelling with gross erythema and pain passive movement. Unclear history, patient incredibly anxious with pressured speech. Vital signs grossly unremarkable. X-ray showing soft tissue swelling, labs pending due to difficulty with IV access. Point-of-care glucose reads \"high\". 10 units of subcu insulin ordered along with p.o. potassium replacement at this time. Discussed with Dr. Janusz Evangelista with regards to concern for tenosynovitis, he states that he will see patient at bedside this morning. Patient signed out to Dr. Yudelka Evans for further care and management.     RADIOLOGY:  See radiology report    EMERGENCY DEPARTMENT COURSE:  ED Course as of Sep 14 0727   Tue Sep 14, 2021   4374 Unable to obtain IV access, discussed with patient with regards to admission for PICC team and IV antibiotics, she is agreeable at this time    [EM]   0633 XR R hand  Marked soft tissue swelling involving the 2nd finger and dorsum of the hand. No acute osseous abnormality evident.    [EM]   0297 Dr Patricia Wilkes will see at bedside this AM    [EM]   2019 Patient declining IV placed by ED member and requesting to wait IV team for IV placement, accepts the risk in delay of treatment and has decision-making capacity    [EM]      ED Course User Index  [EM] Oksana Euceda MD        PROCEDURES:  None    CONSULTS:  IP CONSULT TO PLASTIC SURGERY  IP CONSULT TO IV TEAM    FINAL IMPRESSION      1. Tenosynovitis of finger          DISPOSITION / PLAN     DISPOSITION  Signed out to Dr. Darling Delay:  Walker Sanchez MD  [address]   [address]  [address]  [phone]    Schedule an appointment as soon as possible for a visit   For follow up    [facility] ED  [address] [address]  [phone]  Go to   As needed      DISCHARGE MEDICATIONS:  New Prescriptions    No medications on file       Oksana Euceda MD  Emergency Medicine Resident    (Please note that portions of thisnote were completed with a voice recognition program.  Efforts were made to edit the dictations but occasionally words are mis-transcribed.)       Oksana Euceda MD  Resident  09/14/21 6363

## 2021-09-14 NOTE — ANESTHESIA PRE PROCEDURE
Department of Anesthesiology  Preprocedure Note       Name:  Sid Arnold   Age:  49 y.o.  :  1965                                          MRN:  [id number]         Date:  2021      Surgeon: Aline Young):  Meir Marin MD    Procedure: Procedure(s):  I&D INDEX FINGER    Medications prior to admission:   Prior to Admission medications    Medication Sig Start Date End Date Taking? Authorizing Provider   cetirizine (ZYRTEC) 10 MG tablet Take 1 tablet by mouth daily 21   oJse Macedo MD   insulin glargine (LANTUS SOLOSTAR) 100 UNIT/ML injection pen Inject 20 Units into the skin 2 times daily 21   Jose Macedo MD   pregabalin (LYRICA) 75 MG capsule Take 1 capsule by mouth 2 times daily for 30 days. 21  Viv Cazares MD   pantoprazole (PROTONIX) 20 MG tablet Take 1 tablet by mouth 2 times daily (before meals) 21   Phoebe Goodman MD   metFORMIN (GLUCOPHAGE) 1000 MG tablet Take 1 tablet by mouth 2 times daily (with meals) 21   Phoebe Goodman MD   venlafaxine (EFFEXOR XR) 150 MG extended release capsule Take 1 capsule by mouth daily (with breakfast) 21   Phoebe Goodman MD   dicyclomine (BENTYL) 10 MG capsule Take 1 capsule by mouth 4 times daily 21   Sweta Lovett MD   Misc.  Devices MISC 1 pair of dm shoes, 3 accommodated inserts 21   Bri Tovar DPM   Insulin Pen Needle ([employer] PEN NEEDLES 31G) 31G X 8 MM MISC 1 each by Does not apply route daily 21   Phoebe Goodman MD    MG capsule TAKE 1 CAPSULE BY MOUTH TWICE DAILY 20   Phoebe Goodman MD   traZODone (DESYREL) 150 MG tablet Take 1 tablet by mouth nightly 20   Phoebe Goodman MD   FREESTYLE LITE strip 1 each by Does not apply route 3 times daily 20   Phoebe Goodman MD   FreeStyle Lancets MISC 1 each by Does not apply route 3 times daily 20   Phoebe Goodman MD   Alcohol Swabs (ALCOHOL PREP) 70 % PADS 1 each by Does not apply route as needed (use as needed) Use_____times per day  Diagnosis: 250.0   Diabetes ___Insulin-Dependent___Non-Insulin Dependent 11/11/20   Amaury Mitchell MD   albuterol sulfate  (90 Base) MCG/ACT inhaler Inhale 2 puffs into the lungs every 4 hours as needed for Wheezing 10/20/20 3/11/21  Amaury Mitchell MD   FLOVENT  MCG/ACT inhaler Inhale 2 puffs into the lungs 2 times daily 10/20/20   Amaury Mitchell MD   budesonide-formoterol (SYMBICORT) 80-4.5 MCG/ACT AERO Inhale 2 puffs into the lungs 2 times daily 10/20/20   Amaury Mitchell MD       Current medications:    Current Facility-Administered Medications   Medication Dose Route Frequency Provider Last Rate Last Admin    vancomycin (VANCOCIN) 1500 mg in dextrose 5 % 250 mL IVPB  15 mg/kg IntraVENous Once Parish Caraballo MD        piperacillin-tazobactam (ZOSYN) 3,375 mg in dextrose 5 % 50 mL IVPB (mini-bag)  3,375 mg IntraVENous Once Parish Caraballo MD        0.9 % sodium chloride bolus  1,000 mL IntraVENous Once Parish Caraballo MD        glucose (GLUTOSE) 40 % oral gel 15 g  15 g Oral PRN Ирина Elm, DO        dextrose 50 % IV solution  12.5 g IntraVENous PRN Ирина Elm, DO        glucagon (rDNA) injection 1 mg  1 mg IntraMUSCular PRN Ирина Elm, DO        dextrose 5 % solution  100 mL/hr IntraVENous PRN Ирина Elm, DO        insulin regular (HUMULIN R;NOVOLIN R) 100 Units in sodium chloride 0.9 % 100 mL infusion  0.1 Units/kg/hr IntraVENous Continuous Ирина Elm, DO         Current Outpatient Medications   Medication Sig Dispense Refill    cetirizine (ZYRTEC) 10 MG tablet Take 1 tablet by mouth daily 90 tablet 1    insulin glargine (LANTUS SOLOSTAR) 100 UNIT/ML injection pen Inject 20 Units into the skin 2 times daily 5 pen 3    pregabalin (LYRICA) 75 MG capsule Take 1 capsule by mouth 2 times daily for 30 days.  60 capsule 0    pantoprazole (PROTONIX) 20 MG tablet Take 1 tablet by mouth 2 times daily (before meals) 30 tablet 3    metFORMIN (GLUCOPHAGE) 1000 MG tablet Take 1 tablet by mouth 2 times daily (with meals) 60 tablet 1    venlafaxine (EFFEXOR XR) 150 MG extended release capsule Take 1 capsule by mouth daily (with breakfast) 30 capsule 3    dicyclomine (BENTYL) 10 MG capsule Take 1 capsule by mouth 4 times daily 120 capsule 3    Misc. Devices MISC 1 pair of dm shoes, 3 accommodated inserts 1 Device 0    Insulin Pen Needle ([employer] PEN NEEDLES 31G) 31G X 8 MM MISC 1 each by Does not apply route daily 100 each 3     MG capsule TAKE 1 CAPSULE BY MOUTH TWICE DAILY 60 capsule 3    traZODone (DESYREL) 150 MG tablet Take 1 tablet by mouth nightly 14 tablet 5    FREESTYLE LITE strip 1 each by Does not apply route 3 times daily 100 each 5    FreeStyle Lancets MISC 1 each by Does not apply route 3 times daily 100 each 5    Alcohol Swabs (ALCOHOL PREP) 70 % PADS 1 each by Does not apply route as needed (use as needed) Use_____times per day  Diagnosis: 250.0   Diabetes ___Insulin-Dependent___Non-Insulin Dependent 100 each 11    albuterol sulfate  (90 Base) MCG/ACT inhaler Inhale 2 puffs into the lungs every 4 hours as needed for Wheezing 1 Inhaler 5    FLOVENT  MCG/ACT inhaler Inhale 2 puffs into the lungs 2 times daily 1 Inhaler 3    budesonide-formoterol (SYMBICORT) 80-4.5 MCG/ACT AERO Inhale 2 puffs into the lungs 2 times daily 1 Inhaler 5       Allergies:     Allergies   Allergen Reactions    Bactrim [Sulfamethoxazole-Trimethoprim] Swelling    Adhesive Tape Rash       Problem List:    Patient Active Problem List   Diagnosis Code    IDDM (insulin dependent diabetes mellitus) PDE2159    Lupus ([facility] M32.9    Bipolar disorder, mixed ([facility] F31.60    Post traumatic stress disorder (PTSD) F43.10    Acute cystitis with hematuria N30.01    Hyperglycemia R73.9    Suicidal ideation R45.851    Arthritis M19.90    Chronic back pain M54.9, G89.29    Acid reflux K21.9    History of stroke Z86.73    Polysubstance abuse ([facility] F19.10    Cannabis use disorder, severe, dependence ([address] 12/19/2017    Cerebrovascular accident (CVA) ([address] 6/14/2017    Chest pain 11/5/2016    Chronic renal insufficiency     Cocaine abuse ([address] 1/5/2018    COVID-19 virus RNA test result unknown     DDD (degenerative disc disease), cervical     Diabetes mellitus ([address]     Dizziness 6/13/2017    Fibromyalgia     History of stroke 9/9/2017    Homicidal ideation 11/6/2017    Hyperglycemia     Hypertension     Hypotension 1/18/2019    IDDM (insulin dependent diabetes mellitus) 12/21/2015    Lupus ([address]     Migraine     Neuropathy     Neuropathy     Polysubstance abuse ([address] 9/17/2017    Post traumatic stress disorder (PTSD)     Posttraumatic stress disorder 5/29/2017    Recurrent depression ([address] 5/29/2017    Recurrent major depressive disorder, in partial remission ([address] 11/28/2017    Screening mammogram, encounter for 11/28/2017    Severe recurrent major depression with psychotic features ([address] 12/19/2017    Severe recurrent major depression without psychotic features ([address] 12/19/2017    Stroke (cerebrum) ([address] 6/14/2017    Stroke ([address]     per chart notes    Suicidal ideation     Suicidal intent 3/10/2017    Vitamin D deficiency 11/28/2017    White matter changes 6/13/2017       Past Surgical History:        Procedure Laterality Date    ABDOMEN SURGERY      drain tube    ABSCESS DRAINAGE      right buttock    AMPUTATION Left 03/13/2021    ring finger left    CATARACT REMOVAL WITH IMPLANT Bilateral     CHEST TUBE INSERTION      FINGER AMPUTATION Left 3/13/2021    LEFT RING FINER AMPUTATION performed by Jude Hickman MD at [address] [place] [address] Bilateral        Social History:    Social History     Tobacco Use    Smoking status: Never Smoker    Smokeless tobacco: Never Used   Substance Use Topics    Alcohol use: Not Currently                                Counseling given: Not Answered      Vital Signs (Current):   Vitals: history of anesthetic complications:   Airway: Mallampati: II  TM distance: >3 FB   Neck ROM: full  Mouth opening: > = 3 FB Dental:          Pulmonary:normal exam    (+) asthma: seasonal asthma,                            Cardiovascular:    (+) hypertension: no interval change,       ECG reviewed  Rhythm: regular  Rate: normal                    Neuro/Psych:   (+) CVA: no interval change, neuromuscular disease:, headaches: migraine headaches, psychiatric history:depression/anxiety             GI/Hepatic/Renal:   (+) GERD: no interval change,           Endo/Other:    (+) DiabetesType II DM, using insulin, : arthritis: OA., .                 Abdominal:   (+) obese,           Vascular: negative vascular ROS. Other Findings:             Anesthesia Plan      MAC and general     ASA 3       Induction: intravenous. Anesthetic plan and risks discussed with patient. Plan discussed with CRNA.                   Trang Camargo MD   9/14/2021

## 2021-09-14 NOTE — PROGRESS NOTES
Pharmacy Note  Vancomycin Consult    Kay Cranker is a 49 y.o. female started on Vancomycin for cellulitus; consult received from Dr. Sarbjit Carrillo to manage therapy. Also receiving the following antibiotics: Zosyn.     Patient Active Problem List   Diagnosis    IDDM (insulin dependent diabetes mellitus)    Lupus ([facility])    Bipolar disorder, mixed ([facility])    Post traumatic stress disorder (PTSD)    Acute cystitis with hematuria    Hyperglycemia    Suicidal ideation    Arthritis    Chronic back pain    Acid reflux    History of stroke    Polysubstance abuse ([address]    Bipolar 1 disorder ([facility])    Cocaine abuse ([address]    Chest pain    Acute non-recurrent maxillary sinusitis    Acute respiratory failure with hypercapnia ([facility])    Alcohol use disorder, severe, dependence ([address]    Asthma exacerbation attacks    Bilateral edema of lower extremity    Bipolar 1 disorder, depressed, severe ([facility])    BMI 34.0-34.9,adult    Cannabis use disorder, severe, dependence ([address]    Cocaine use disorder, severe, dependence ([address]    Dizziness    Dyslipidemia    Family history of colon cancer    History of lupus    Homicidal ideation    Hypotension    Neuropathy    Normocytic anemia    Recurrent depression ([facility])    Recurrent major depressive disorder, in partial remission ([facility])    Severe recurrent major depression with psychotic features ([facility])    Severe recurrent major depression without psychotic features ([address]    Upper GI bleed    Vitamin D deficiency    White matter changes    Gastroesophageal reflux disease    Stroke (cerebrum) ([address]    Rib lesion    Diabetes mellitus type 2 in obese ([facility])    YAEL (generalized anxiety disorder)    Posttraumatic stress disorder    Suicidal intent    Leg weakness, bilateral    Poorly controlled diabetes mellitus ([address]    Acute kidney injury ([address]    Felon of finger    Osteomyelitis ([facility])    Recurrent falls    Seasonal allergic rhinitis    Fibromyalgia    Flexor tenosynovitis of finger     Allergies: Bactrim [sulfamethoxazole-trimethoprim] and Adhesive tape     Temp max: 98.5    Recent Labs     09/14/21  0703   BUN 11   CREATININE 1.08*   WBC 9.4       Intake/Output Summary (Last 24 hours) at 9/14/2021 1336  Last data filed at 9/14/2021 1005  Gross per 24 hour   Intake 300 ml   Output --   Net 300 ml       Ht Readings from Last 1 Encounters:   09/14/21 5' 6\" (1.676 m)        Wt Readings from Last 1 Encounters:   09/14/21 238 lb (108 kg)       Body mass index is 38.41 kg/m². Estimated Creatinine Clearance: 74 mL/min (A) (based on SCr of 1.08 mg/dL (H)). Goal Trough Level: 10-15 mcg/mL    Assessment/Plan:  Will initiate Vancomycin with a one time loading dose of 1500 mg x 1 (Given at 0921 today), followed by 1000 mg IV every 12 hours. Timing of trough level will be determined based on culture results, renal function, and clinical response. Thank you for the consult. Will continue to follow.   Daryle Shelter, PharmD, BCPS  9/14/2021  1:38 PM

## 2021-09-14 NOTE — H&P
H&P/consult - Plastics - [facility]    Re: infection right index finger. Patient reports an approx 24 hr H/O swelling and pain right index finger. Only trauma was fall on it about 5 hours ago. Pain developed prior. Past H/O felon requiring distal amputation left long finger earlier in the year. PMH:    \"spot on lung\"  H/O chest tube. Large abdominal abscess post . NIDDM, states morning glucose runs around 130. Outpatient Medications        cetirizine (ZYRTEC) 10 MG tablet Take 1 tablet by mouth daily    insulin glargine (LANTUS SOLOSTAR) 100 UNIT/ML injection pen Inject 20 Units into the skin 2 times daily    pregabalin (LYRICA) 75 MG capsule() Take 1 capsule by mouth 2 times daily for 30 days. pantoprazole (PROTONIX) 20 MG tablet Take 1 tablet by mouth 2 times daily (before meals)    metFORMIN (GLUCOPHAGE) 1000 MG tablet Take 1 tablet by mouth 2 times daily (with meals)    venlafaxine (EFFEXOR XR) 150 MG extended release capsule Take 1 capsule by mouth daily (with breakfast)    dicyclomine (BENTYL) 10 MG capsule Take 1 capsule by mouth 4 times daily    Misc.  Devices MISC 1 pair of dm shoes, 3 accommodated inserts    Insulin Pen Needle ([employer] PEN NEEDLES 31G) 31G X 8 MM MISC 1 each by Does not apply route daily     MG capsule TAKE 1 CAPSULE BY MOUTH TWICE DAILY    traZODone (DESYREL) 150 MG tablet Take 1 tablet by mouth nightly    FREESTYLE LITE strip 1 each by Does not apply route 3 times daily    FreeStyle Lancets MISC 1 each by Does not apply route 3 times daily    Alcohol Swabs (ALCOHOL PREP) 70 % PADS 1 each by Does not apply route as needed (use as needed) Use_____times per day Diagnosis: 250.0 Diabetes ___Insulin-Dependent___Non-Insulin Dependent    albuterol sulfate  (90 Base) MCG/ACT inhaler() Inhale 2 puffs into the lungs every 4 hours as needed for Wheezing    FLOVENT  MCG/ACT inhaler Inhale 2 puffs into the lungs 2 times daily budesonide-formoterol (SYMBICORT) 80-4.5 MCG/ACT AERO Inhale 2 puffs into the lungs 2 times daily       Allergies:  Bactrim  Adhesive tape    EXAM:  Patient awake and alert, cooperative. HEENT: NC/AT, PERRL, EOMI, mucous membranes are pink and moist.  Chest: breathing nonlabored. Abd: benign. Ext: prev distal amputation left long finger. Right index finger markedly swollen from tip to midpalm. Volar blister unroofed in ER and cultured. Extremely tender. Able to wiggle DIP minimally. Imp:  Cellulitis/abscess right index finger. Plan:  To O.R. for exploration/drainage. I have discussed with the patient the indication, alternatives, and the possible risks and/or complications which include but are not limited to those delineated on the consent form for the planned procedure and the anesthesia methods. All questions were answered to patient's satisfaction. Consent was reviewed and signed. N.B. patient right handed, so made clayton on consent with left hand.

## 2021-09-14 NOTE — ED PROVIDER NOTES
[facility]  FACULTY HANDOFF       Handoff taken on the following patient from prior Attending Physician:  Pt Name: Sid Frankspete  PCP:  Michelle Maria MD    Attestation  I was available and discussed any additional care issues that arose and coordinated the management plans with the resident(s) caring for the patient during my duty period. Any areas of disagreement with resident's documentation of care or procedures are noted on the chart. I was personally present for the key portions of any/all procedures during my duty period. I have documented in the chart those procedures where I was not present during the key portions. CHIEF COMPLAINT     No chief complaint on file.         CURRENT MEDICATIONS     Previous Medications  Previous Medications    ALBUTEROL SULFATE  (90 BASE) MCG/ACT INHALER    Inhale 2 puffs into the lungs every 4 hours as needed for Wheezing    ALCOHOL SWABS (ALCOHOL PREP) 70 % PADS    1 each by Does not apply route as needed (use as needed) Use_____times per day  Diagnosis: 250.0   Diabetes ___Insulin-Dependent___Non-Insulin Dependent    BUDESONIDE-FORMOTEROL (SYMBICORT) 80-4.5 MCG/ACT AERO    Inhale 2 puffs into the lungs 2 times daily    CETIRIZINE (ZYRTEC) 10 MG TABLET    Take 1 tablet by mouth daily    DICYCLOMINE (BENTYL) 10 MG CAPSULE    Take 1 capsule by mouth 4 times daily     MG CAPSULE    TAKE 1 CAPSULE BY MOUTH TWICE DAILY    FLOVENT  MCG/ACT INHALER    Inhale 2 puffs into the lungs 2 times daily    FREESTYLE LANCETS MISC    1 each by Does not apply route 3 times daily    FREESTYLE LITE STRIP    1 each by Does not apply route 3 times daily    INSULIN GLARGINE (LANTUS SOLOSTAR) 100 UNIT/ML INJECTION PEN    Inject 20 Units into the skin 2 times daily    INSULIN PEN NEEDLE ([employer] PEN NEEDLES 31G) 31G X 8 MM MISC    1 each by Does not apply route daily    METFORMIN (GLUCOPHAGE) 1000 MG TABLET    Take 1 tablet by mouth 2 times Awake daily (with meals)    MISC. DEVICES MISC    1 pair of dm shoes, 3 accommodated inserts    PANTOPRAZOLE (PROTONIX) 20 MG TABLET    Take 1 tablet by mouth 2 times daily (before meals)    PREGABALIN (LYRICA) 75 MG CAPSULE    Take 1 capsule by mouth 2 times daily for 30 days. TRAZODONE (DESYREL) 150 MG TABLET    Take 1 tablet by mouth nightly    VENLAFAXINE (EFFEXOR XR) 150 MG EXTENDED RELEASE CAPSULE    Take 1 capsule by mouth daily (with breakfast)       Encounter Medications  Orders Placed This Encounter   Medications    Tetanus-Diphth-Acell Pertussis (BOOSTRIX) injection 0.5 mL    vancomycin (VANCOCIN) 1500 mg in dextrose 5 % 250 mL IVPB     Order Specific Question:   Antimicrobial Indications     Answer:   Skin and Soft Tissue Infection    piperacillin-tazobactam (ZOSYN) 3,375 mg in dextrose 5 % 50 mL IVPB (mini-bag)     Order Specific Question:   Antimicrobial Indications     Answer:   Skin and Soft Tissue Infection    acetaminophen (TYLENOL) tablet 1,000 mg    0.9 % sodium chloride bolus    DISCONTD: potassium bicarb-citric acid (EFFER-K) effervescent tablet 40 mEq    insulin lispro (HUMALOG) injection vial 10 Units    lidocaine 1 % injection 20 mL    glucose (GLUTOSE) 40 % oral gel 15 g    dextrose 50 % IV solution    glucagon (rDNA) injection 1 mg    dextrose 5 % solution    insulin regular (HUMULIN R;NOVOLIN R) 100 Units in sodium chloride 0.9 % 100 mL infusion       ALLERGIES     is allergic to bactrim [sulfamethoxazole-trimethoprim] and adhesive tape. RECENT VITALS:   Temp: 98.5 °F (36.9 °C),  Pulse: 98, Resp: 18, BP: (!) 115/91    RADIOLOGY:   XR HAND RIGHT (MIN 3 VIEWS)   Final Result   Marked soft tissue swelling involving the 2nd finger and dorsum of the hand. No acute osseous abnormality evident.              LABS:  Labs Reviewed   CBC WITH AUTO DIFFERENTIAL - Abnormal; Notable for the following components:       Result Value    RBC 3.40 (*)     Hemoglobin 10.7 (*)     Hematocrit 36.2 (*)     .5 (*)     Platelets 59 (*)     Seg Neutrophils 81 (*)     Lymphocytes 9 (*)     Eosinophils % 0 (*)     Immature Granulocytes 1 (*)     Absolute Lymph # 0.85 (*)     All other components within normal limits   BASIC METABOLIC PANEL - Abnormal; Notable for the following components:    Glucose 719 (*)     CREATININE 1.08 (*)     Sodium 126 (*)     Chloride 87 (*)     CO2 16 (*)     Anion Gap 23 (*)     GFR Non- 53 (*)     All other components within normal limits   SEDIMENTATION RATE - Abnormal; Notable for the following components:    Sed Rate 69 (*)     All other components within normal limits   C-REACTIVE PROTEIN - Abnormal; Notable for the following components:    .9 (*)     All other components within normal limits   BETA-HYDROXYBUTYRATE - Abnormal; Notable for the following components:    Beta-Hydroxybutyrate 4.42 (*)     All other components within normal limits   POCT GLUCOSE - Normal   COVID-19, RAPID   CULTURE, WOUND   BLOOD GAS, VENOUS   POC BLOOD GAS AND CHEMISTRY   POCT GLUCOSE   POCT GLUCOSE   POCT GLUCOSE   POCT GLUCOSE   POCT GLUCOSE   POCT GLUCOSE   POCT GLUCOSE   POCT GLUCOSE   POCT GLUCOSE   POCT GLUCOSE   POCT GLUCOSE   POCT GLUCOSE   POCT GLUCOSE           PLAN/ TASKS OUTSTANDING           (Please note that portions of this note were completed with a voice recognition program.  Efforts were made to edit the dictations but occasionally words are mis-transcribed.)    Kandace Link MD,, MD, F.A.C.E.P.   Attending Emergency Physician       Kandace Link MD  09/14/21 3459

## 2021-09-14 NOTE — ED PROVIDER NOTES
[facility] ED  Emergency Department  Emergency Medicine     Care of Eros Frank was assumed from Dr July Vásquez and is being seen for No chief complaint on file. .  The patient's initial evaluation and plan have been discussed with the prior provider who initially evaluated the patient. EMERGENCY DEPARTMENT COURSE / MEDICAL DECISION MAKING:       MEDICATIONS GIVEN:  Orders Placed This Encounter   Medications    Tetanus-Diphth-Acell Pertussis (BOOSTRIX) injection 0.5 mL    vancomycin (VANCOCIN) 1500 mg in dextrose 5 % 250 mL IVPB     Order Specific Question:   Antimicrobial Indications     Answer:   Skin and Soft Tissue Infection    piperacillin-tazobactam (ZOSYN) 3,375 mg in dextrose 5 % 50 mL IVPB (mini-bag)     Order Specific Question:   Antimicrobial Indications     Answer:   Skin and Soft Tissue Infection    acetaminophen (TYLENOL) tablet 1,000 mg       LABS / RADIOLOGY:     Labs Reviewed   POCT GLUCOSE - Normal   CBC WITH AUTO DIFFERENTIAL   BASIC METABOLIC PANEL   SEDIMENTATION RATE   C-REACTIVE PROTEIN   BLOOD GAS, VENOUS   BETA-HYDROXYBUTYRATE   POC BLOOD GAS AND CHEMISTRY       XR HAND RIGHT (MIN 3 VIEWS)    Result Date: 9/14/2021  EXAMINATION: THREE XRAY VIEWS OF THE RIGHT HAND 9/14/2021 6:05 am COMPARISON: None. HISTORY: ORDERING SYSTEM PROVIDED HISTORY: R index finger swelling with discolouration TECHNOLOGIST PROVIDED HISTORY: R index finger swelling with discolouration FINDINGS: No fracture, dislocation or joint abnormality is identified. Bone density is normal.  Marked soft tissue swelling is present throughout the 2nd finger and over the dorsum of the hand. At the volar aspect of the 3rd or 4th middle phalanx at the proximal metadiaphyseal junction region, there is a bony defect likely related to old trauma. Marked soft tissue swelling involving the 2nd finger and dorsum of the hand. No acute osseous abnormality evident.        RECENT VITALS:     Temp: 98.5 °F (36.9 °C), IV access for further treatment with IV fluids, IV antibiotics, IV insulin. PICC team at bedside. Medicine was paged for admission of the patient, they accepted admission. Patient to OR.        OUTSTANDING TASKS / RECOMMENDATIONS:    1. Patient has IV access established, follow-up labs to assess for DKA  2. Plastic surgery attending to evaluate patient  3. Admission       FINAL IMPRESSION:     1. Tenosynovitis of finger        DISPOSITION:         DISPOSITION:  []  Discharge   []  Transfer -    [x]  Admission -     []  Against Medical Advice   []  Eloped   FOLLOW-UP: Cynthia Carrillo MD  [address]   [address]  [address]  [phone]    Schedule an appointment as soon as possible for a visit   For follow up    [facility]  [address] [address]  [phone]  Go to   As needed     [address]: New Prescriptions    No medications on file          Yahaira Olivera, [facility]  Emergency Medicine       Yahaira Olivera DO  Resident  09/14/21 1122

## 2021-09-14 NOTE — ED PROVIDER NOTES
[address] [facility]     Emergency Department     Faculty Attestation    I performed a history and physical examination of the patient and discussed management with the resident. I have reviewed and agree with the residents findings including all diagnostic interpretations, and treatment plans as written. Any areas of disagreement are noted on the chart. I was personally present for the key portions of any procedures. I have documented in the chart those procedures where I was not present during the key portions. I have reviewed the emergency nurses triage note. I agree with the chief complaint, past medical history, past surgical history, allergies, medications, social and family history as documented unless otherwise noted below. Documentation of the HPI, Physical Exam and Medical Decision Making performed by scribjesika is based on my personal performance of the HPI, PE and MDM. For Physician Assistant/ Nurse Practitioner cases/documentation I have personally evaluated this patient and have completed at least one if not all key elements of the E/M (history, physical exam, and MDM). Additional findings are as noted. 50 yo F c/o falling today injury to R hand, no loc, no head or neck pain,   Pt reports she injured hand today,   pe sbp 175, hr 98,   Non toxic,   r hand: r 2nd digit held in flexion, resistant to passive extension,   Swelling, discoloration volar surface of r 2nd digit, mild swelling r 1st \ 3rd digits    Care turned over to day shift,     EKG Interpretation    Interpreted by me      CRITICAL CARE: There was a high probability of clinically significant/life threatening deterioration in this patient's condition which required my urgent intervention. Total critical care time was 15 minutes. This excludes any time for separately reportable procedures.        [facility] [place] [facility], DO  09/14/21 1550 [address], DO  09/15/21 6699

## 2021-09-14 NOTE — H&P
Attending Physician Statement  Please refer to resident note for details. I have discussed the case, including pertinent history and exam findings with the resident and the team.  I have seen and examined the patient and the key elements of the encounter have been performed by me. I agree with the assessment, plan and orders as documented by the resident. Review of Systems:   In addition to the pertinent positives and negatives as stated within HPI and the review of systems as documented in their notes, all other systems were reviewed when able to and are reported negative. 49years old female who came into the hospital because of right hand soreness. Specifically swelling of her right index finger. Patient says she had a fall and just when it happened. However the timeline is little blurred. Patient was diagnosed with tenosynovitis and plastic surgery already took her to the operating room for surgery. Patient was also found to be in DKA. We will admit the patient. Monitor her vitals closely. Continue vancomycin and Zosyn. W ound care per plastic surgery. Follow-up on the cultures. For DKA continue her on the DKA protocol. Give her IV fluids. Patient admits to noncompliance. She was advised to be compliant with medical care. Patient denies smoking or alcohol abuse. Also patient has thrombocytopenia which could be related to her infection. We will check hepatitis panel and also peripheral smear. Monitor vitals closely.       Orin Fuchs MD  Attending Physician, Internal Medicine Service    Internal Medicine Residency Program  9/14/2021, 11:27 PM]

## 2021-09-14 NOTE — LETTER
Outpatient Diabetes Education Program     9/20/21    RE: Danyell Caraballo  1965  [address]        Dear Toño Lagunas MD    As a follow up to a [facility] admission for diabetes related illness or inpatient diabetes education referral, please consider ordering an outpatient diabetes education / MNT to address ongoing diabetes self management needs. Please place order via [employer]/ [place] system:   [id number] - Ambulatory referral to Diabetic Education -  Denisha Granados  ED     Diabetes Education Services  staff will contact the patient to set up assessment and education sessions. Thank you.

## 2021-09-14 NOTE — ANESTHESIA POSTPROCEDURE EVALUATION
Department of Anesthesiology  Postprocedure Note    Patient: Teressa Hoover  MRN: [id number]  YOB: 1965  Date of evaluation: 9/14/2021  Time:  11:00 AM     Procedure Summary     Date: 09/14/21 Room / Location: [address]    Anesthesia Start: 5935 Anesthesia Stop: 4317    Procedure: I&D INDEX FINGER (Right Fingers) Diagnosis: (INFECTION)    Surgeons: Wicho Saavedra MD Responsible Provider: Maren Urban MD    Anesthesia Type: MAC, general ASA Status: 3          Anesthesia Type: MAC, general    Elin Phase I:      Elin Phase II:      Last vitals: Reviewed and per EMR flowsheets.        Anesthesia Post Evaluation    Patient location during evaluation: PACU  Patient participation: complete - patient participated  Level of consciousness: awake and alert  Pain score: 3  Airway patency: patent  Nausea & Vomiting: no vomiting and no nausea  Complications: no  Cardiovascular status: hemodynamically stable  Respiratory status: acceptable  Hydration status: stable

## 2021-09-14 NOTE — H&P
[address]     Department of Internal Medicine - Staff Internal Medicine Teaching Service          ADMISSION NOTE/HISTORY AND PHYSICAL EXAMINATION   Date: 9/14/2021  Patient Name: Sarah Wesley  Date of admission: 9/14/2021  5:22 AM  YOB: 1965  PCP: Adama Sauceda MD  History Obtained From:  patient    CHIEF COMPLAINT     Chief complaint: Right index finger pain    HISTORY OF PRESENTING ILLNESS     The patient is a pleasant 49 y.o. female presents with a chief complaint of right index finger pain. The patient has PMH significant for DM-2, HTN, Bipolar disorder, and polysubstance abuse. The patient reported that she had a fall yesterday and injured her right index finger and since then has had swelling and pain. She denies hitting her head or loss of consciousness. She denies urine or fecal incontinence. She denies neck pain, chest pain, shortness of breath, abdominal pain. The patient was tearful during the interview and was unable to provide a detailed history. She denies intention of self-harm or suicidal ideation. Plastic surgery was consulted and patient was taken to OR for exploration and drainage of right index finger abscess. In the ED, the patient was afebrile Temp 98.5F, RR 18, HR 98, /123, SpO2 95%. Labs reviewed: Na 126, K 4.9, bicarb 16, BUN 11, Cr 1.08, Glucose 719, beta-hydroxy 4.42, WBC 9.4, Hgb 10.7, Plt 59, ESR 69, Blood gas: pH 7.283/ pCO2 53.7/ pO2 20.1/ HCO3 24.6Covid negative, wound culture shows gram negative rods. The patient received 10 units of insulin in the ED and was started on DKA protocol with insulin gtt and NS IVF. She was started on Vanc and Zosyn. UA, CXR, HbA1c ordered, will follow up. Review of Systems:  Review of Systems   Constitutional: Negative for chills, diaphoresis and fever. HENT: Negative for ear pain, mouth sores and sinus pain. Eyes: Negative for photophobia, redness and visual disturbance. Respiratory: Negative for cough, shortness of breath and wheezing. Cardiovascular: Negative for chest pain, palpitations and leg swelling. Gastrointestinal: Negative for blood in stool, constipation, nausea and vomiting. Endocrine: Negative for polydipsia, polyphagia and polyuria. Genitourinary: Negative for dysuria, frequency, hematuria and urgency. Musculoskeletal: Negative for back pain, joint swelling and neck pain. Skin: Positive for wound (right index finger). Neurological: Negative for tremors, syncope, numbness and headaches. Psychiatric/Behavioral: Positive for dysphoric mood. Negative for agitation, behavioral problems and confusion.          PAST MEDICAL HISTORY     Past Medical History:   Diagnosis Date    Acid reflux 5/29/2017    Acute cystitis with hematuria 10/11/2016    Acute non-recurrent maxillary sinusitis 11/28/2017    Asthma     Bipolar 1 disorder ([address] 1/4/2018    Bipolar disorder, mixed ([address]     BMI 34.0-34.9,adult 11/28/2017    Cannabis use disorder, severe, dependence ([address] 12/19/2017    Cerebrovascular accident (CVA) ([address] 6/14/2017    Chest pain 11/5/2016    Chronic renal insufficiency     Cocaine abuse ([address] 1/5/2018    COVID-19 virus RNA test result unknown     DDD (degenerative disc disease), cervical     Diabetes mellitus ([address]     Dizziness 6/13/2017    Fibromyalgia     History of stroke 9/9/2017    Homicidal ideation 11/6/2017    Hyperglycemia     Hypertension     Hypotension 1/18/2019    IDDM (insulin dependent diabetes mellitus) 12/21/2015    Lupus ([address]     Migraine     Neuropathy     Neuropathy     Polysubstance abuse ([address] 9/17/2017    Post traumatic stress disorder (PTSD)     Posttraumatic stress disorder 5/29/2017    Recurrent depression ([address] 5/29/2017    Recurrent major depressive disorder, in partial remission ([address] 11/28/2017    Screening mammogram, encounter for 11/28/2017    Severe recurrent major depression with psychotic features ([facility] 12/19/2017    Severe recurrent major depression without psychotic features ([facility] 12/19/2017    Stroke (cerebrum) ([facility] 6/14/2017    [facility]     per chart notes    Suicidal ideation     Suicidal intent 3/10/2017    Vitamin D deficiency 11/28/2017    White matter changes 6/13/2017       PAST SURGICAL HISTORY     Past Surgical History:   Procedure Laterality Date    ABDOMEN SURGERY      drain tube    ABSCESS DRAINAGE      right buttock    AMPUTATION Left 03/13/2021    ring finger left    CATARACT REMOVAL WITH IMPLANT Bilateral     CHEST TUBE INSERTION      FINGER AMPUTATION Left 3/13/2021    LEFT RING FINER AMPUTATION performed by Jude Hickman MD at [facility] Bilateral        ALLERGIES     Bactrim [sulfamethoxazole-trimethoprim] and Adhesive tape    MEDICATIONS PRIOR TO ADMISSION     Prior to Admission medications    Medication Sig Start Date End Date Taking? Authorizing Provider   cetirizine (ZYRTEC) 10 MG tablet Take 1 tablet by mouth daily 8/9/21   Jose Macedo MD   insulin glargine (LANTUS SOLOSTAR) 100 UNIT/ML injection pen Inject 20 Units into the skin 2 times daily 8/9/21   Jose Macedo MD   pregabalin (LYRICA) 75 MG capsule Take 1 capsule by mouth 2 times daily for 30 days. 8/9/21 9/8/21  Eleanor Elizabeth MD   pantoprazole (PROTONIX) 20 MG tablet Take 1 tablet by mouth 2 times daily (before meals) 7/22/21   Louise Garcia MD   metFORMIN (GLUCOPHAGE) 1000 MG tablet Take 1 tablet by mouth 2 times daily (with meals) 7/22/21   Louise Garcia MD   venlafaxine (EFFEXOR XR) 150 MG extended release capsule Take 1 capsule by mouth daily (with breakfast) 7/22/21   Louise Garcia MD   dicyclomine (BENTYL) 10 MG capsule Take 1 capsule by mouth 4 times daily 7/12/21   Sravani Arzate MD   Misc.  Devices MISC 1 pair of dm shoes, 3 accommodated inserts 1/22/21   Murel Din, DPM   Insulin Pen Needle ([employer] PEN NEEDLES 31G) 31G X 8 MM MISC 1 each by Does not apply EXAMINATION:  Constitutional: This is a well developed, well nourished, 35-39.9 - Obesity Grade II 49y.o. year old female who is alert, oriented, cooperative and in no apparent distress. Head:normocephalic and atraumatic. EENT:  PERRLA. No conjunctival injections. Septum was midline, mucosa was without erythema, exudates or cobblestoning. No thrush was noted. Neck: Supple without thyromegaly. No elevated JVP. Trachea was midline. Respiratory: Chest was symmetrical without dullness to percussion. Breath sounds bilaterally were clear to auscultation. There were no wheezes, rhonchi or rales. There is no intercostal retraction or use of accessory muscles. No egophony noted. Cardiovascular: Regular without murmur, clicks, gallops or rubs. Abdomen: Slightly rounded and soft without organomegaly. No rebound, rigidity or guarding was appreciated. Lymphatic: No lymphadenopathy. Musculoskeletal:  No gross muscle weakness. Extremities:  No lower extremity edema, ulcerations, tenderness, varicosities or erythema. Muscle size, tone and strength are normal.  No involuntary movements are noted. Skin:  Warm and dry. Good color, turgor and pigmentation. No lesions or scars. No cyanosis or clubbing  Neurological/Psychiatric: The patient's general behavior, level of consciousness, thought content and emotional status is normal.          INVESTIGATIONS     Laboratory Testing:     Recent Results (from the past 24 hour(s))   POC Glucose Fingerstick    Collection Time: 09/14/21  6:26 AM   Result Value Ref Range    POC Glucose >600 (HH) 65 - 105 mg/dL   POCT Glucose    Collection Time: 09/14/21  6:30 AM   Result Value Ref Range    QC OK?  yes    CBC WITH AUTO DIFFERENTIAL    Collection Time: 09/14/21  7:03 AM   Result Value Ref Range    WBC 9.4 3.5 - 11.3 k/uL    RBC 3.40 (L) 3.95 - 5.11 m/uL    Hemoglobin 10.7 (L) 11.9 - 15.1 g/dL    Hematocrit 36.2 (L) 36.3 - 47.1 %    .5 (H) 82.6 - 102.9 fL    MCH 31.5 25.2 - 33.5 pg    MCHC 29.6 28.4 - 34.8 g/dL    RDW 13.2 11.8 - 14.4 %    Platelets 59 (L) 929 - 453 k/uL    MPV 11.9 8.1 - 13.5 fL    NRBC Automated 0.0 0.0 per 100 WBC    Differential Type NOT REPORTED     Seg Neutrophils 81 (H) 36 - 65 %    Lymphocytes 9 (L) 24 - 43 %    Monocytes 9 3 - 12 %    Eosinophils % 0 (L) 1 - 4 %    Basophils 0 0 - 2 %    Immature Granulocytes 1 (H) 0 %    Segs Absolute 7.59 1.50 - 8.10 k/uL    Absolute Lymph # 0.85 (L) 1.10 - 3.70 k/uL    Absolute Mono # 0.85 0.10 - 1.20 k/uL    Absolute Eos # <0.03 0.00 - 0.44 k/uL    Basophils Absolute 0.04 0.00 - 0.20 k/uL    Absolute Immature Granulocyte 0.09 0.00 - 0.30 k/uL    WBC Morphology NOT REPORTED     RBC Morphology MACROCYTOSIS PRESENT     Platelet Estimate NOT REPORTED    BASIC METABOLIC PANEL    Collection Time: 09/14/21  7:03 AM   Result Value Ref Range    Glucose 719 (HH) 70 - 99 mg/dL    BUN 11 6 - 20 mg/dL    CREATININE 1.08 (H) 0.50 - 0.90 mg/dL    Bun/Cre Ratio NOT REPORTED 9 - 20    Calcium 9.1 8.6 - 10.4 mg/dL    Sodium 126 (L) 135 - 144 mmol/L    Potassium 4.9 3.7 - 5.3 mmol/L    Chloride 87 (L) 98 - 107 mmol/L    CO2 16 (L) 20 - 31 mmol/L    Anion Gap 23 (H) 9 - 17 mmol/L    GFR Non-African American 53 (L) >60 mL/min    GFR African American >60 >60 mL/min    GFR Comment          GFR Staging NOT REPORTED    Sedimentation Rate    Collection Time: 09/14/21  7:03 AM   Result Value Ref Range    Sed Rate 69 (H) 0 - 30 mm   C-REACTIVE PROTEIN    Collection Time: 09/14/21  7:03 AM   Result Value Ref Range    .9 (H) 0.0 - 5.0 mg/L   Beta-Hydroxybutyrate    Collection Time: 09/14/21  7:03 AM   Result Value Ref Range    Beta-Hydroxybutyrate 4.42 (H) 0.02 - 0.27 mmol/L   COVID-19, Rapid    Collection Time: 09/14/21  7:56 AM    Specimen: Nasopharyngeal Swab   Result Value Ref Range    Specimen Description . NASOPHARYNGEAL SWAB     SARS-CoV-2, Rapid Not Detected Not Detected   POC Glucose Fingerstick    Collection Time: 09/14/21 8:56 AM   Result Value Ref Range    POC Glucose 592 (HH) 65 - 105 mg/dL       Imaging:   XR HAND RIGHT (MIN 3 VIEWS)    Result Date: 9/14/2021  Marked soft tissue swelling involving the 2nd finger and dorsum of the hand. No acute osseous abnormality evident. ASSESSMENT & PLAN     ASSESSMENT / PLAN:     IMPRESSION  This is a 49 y.o. female who presented with right index finger pain and found to have tenosynovitis of right index finger and DKA. Tenosynovitis Right index finger  - s/p drainage of abscess per plastic   - Plastic surgery following- wound care   - Received 1 dose of Vancomycin and TDAP vaccine shot  - Started on Zosyn and Daptomycin  - ID following: Discontiued vancomycin, started Daptomycin. - CPK ordered, will follow up      DKA  - Glucose 719, beta-hydroxy 4.42  - blood gas: pH 7.283/ pCO2 53.7/ pO2 20.1/ HCO3 24.6  - Patient received 10 units insulin and started on DKA protocol with insulin gtt and IVF in the ED  - Continue to monitor blood glucose q1 hour  - Continue to monitor BMP labs q4 hours  - On IVF NA @ 250 mL/h + Insulin gtt  - Patient is on home Lantus 20 units BID  - HbA1c ordered, will follow up      Polysubstance abuse  - Urine tox: positive for benzodiazepine, cocaine and opiates  - Avoid beta blockers  - Monitor for withdrawal symptoms  - Monitor BP closely    HTN  - uncontrolled intermittently   - possibly secondary to polysubstance abuse  - Monitor BP; avoid beta-blockers    Thrombocytopenia- Plt 59  - secondary to infection vs. Drug abuse  - PEripehral smear ordered, will follow up  - Continue to monitor  - Hold heparin sq for DVT PPX; Hold Lovenox due to MARTIN    MARTIN- Cr 1.08 (0.8~0.9)  - Continue IVF  - Continue to monitor      DVT ppx: EPC cuffs  GI ppx: Protonix    PT/OT: On board  Discharge Planning:  consulted, will follow up      Hang Taylor MD  Internal Medicine Resident, PGY-1  [facility];  [place], [place]  9/14/2021, 9:44 AM

## 2021-09-14 NOTE — ANESTHESIA PRE PROCEDURE
Department of Anesthesiology  Preprocedure Note       Name:  Larry Sherman   Age:  49 y.o.  :  1965                                          MRN:  [id number]         Date:  2021      Surgeon: Michael Rodriguez):  Jack Keyes MD    Procedure: Procedure(s):  I&D INDEX FINGER    Medications prior to admission:   Prior to Admission medications    Medication Sig Start Date End Date Taking? Authorizing Provider   cetirizine (ZYRTEC) 10 MG tablet Take 1 tablet by mouth daily 21   Jose Macedo MD   insulin glargine (LANTUS SOLOSTAR) 100 UNIT/ML injection pen Inject 20 Units into the skin 2 times daily 21   Jose Macedo MD   pregabalin (LYRICA) 75 MG capsule Take 1 capsule by mouth 2 times daily for 30 days. 21  Saumya Cruz MD   pantoprazole (PROTONIX) 20 MG tablet Take 1 tablet by mouth 2 times daily (before meals) 21   Walker Sanchez MD   metFORMIN (GLUCOPHAGE) 1000 MG tablet Take 1 tablet by mouth 2 times daily (with meals) 21   Walker Sanchez MD   venlafaxine (EFFEXOR XR) 150 MG extended release capsule Take 1 capsule by mouth daily (with breakfast) 21   Walker Sanchez MD   dicyclomine (BENTYL) 10 MG capsule Take 1 capsule by mouth 4 times daily 21   Camilla Gifford MD   Misc.  Devices MISC 1 pair of dm shoes, 3 accommodated inserts 21   Leatha Mullins DPM   Insulin Pen Needle ([employer] PEN NEEDLES 31G) 31G X 8 MM MISC 1 each by Does not apply route daily 21   Walker Sanchez MD    MG capsule TAKE 1 CAPSULE BY MOUTH TWICE DAILY 20   Walker Sanchez MD   traZODone (DESYREL) 150 MG tablet Take 1 tablet by mouth nightly 20   Walker Sanchez MD   FREESTYLE LITE strip 1 each by Does not apply route 3 times daily 20   Walker Sanchez MD   FreeStyle Lancets MISC 1 each by Does not apply route 3 times daily 20   Walker Sanchez MD   Alcohol Swabs (ALCOHOL PREP) 70 % PADS 1 each by Does not apply route as needed (use as needed) Use_____times per day  Diagnosis: 250.0   Diabetes ___Insulin-Dependent___Non-Insulin Dependent 11/11/20   Pna Velásquez MD   albuterol sulfate  (90 Base) MCG/ACT inhaler Inhale 2 puffs into the lungs every 4 hours as needed for Wheezing 10/20/20 3/11/21  Pan Velásquez MD   FLOVENT  MCG/ACT inhaler Inhale 2 puffs into the lungs 2 times daily 10/20/20   Pan Velásquez MD   budesonide-formoterol (SYMBICORT) 80-4.5 MCG/ACT AERO Inhale 2 puffs into the lungs 2 times daily 10/20/20   Pan Velásquez MD       Current medications:    No current facility-administered medications for this visit. No current outpatient medications on file.      Facility-Administered Medications Ordered in Other Visits   Medication Dose Route Frequency Provider Last Rate Last Admin    0.9 % sodium chloride bolus  1,000 mL IntraVENous Once Padma Godoy MD        glucose (GLUTOSE) 40 % oral gel 15 g  15 g Oral PRN Padma Godoy MD        dextrose 50 % IV solution  12.5 g IntraVENous PRN Padma Godoy MD        glucagon (rDNA) injection 1 mg  1 mg IntraMUSCular PRN Padma Godoy MD        dextrose 5 % solution  100 mL/hr IntraVENous PRN Padma Godoy MD        albuterol sulfate  (90 Base) MCG/ACT inhaler 2 puff  2 puff Inhalation Q4H PRN Padma Godoy MD        budesonide-formoterol [facility]) 80-4.5 MCG/ACT inhaler 2 puff  2 puff Inhalation BID Padma Godoy MD        pantoprazole (PROTONIX) tablet 20 mg  20 mg Oral BID AC Padma Godoy MD   20 mg at 09/14/21 1610    traZODone (DESYREL) tablet 150 mg  150 mg Oral Nightly Padma Godoy MD        pregabalin (LYRICA) capsule 75 mg  75 mg Oral BID Padma Godoy MD        [START ON 9/15/2021] venlafaxine (EFFEXOR XR) extended release capsule 150 mg  150 mg Oral Daily with breakfast Padma Godoy MD        sodium chloride flush 0.9 % injection 5-40 mL  5-40 mL IntraVENous 2 times per day [facility] Lashell Flores MD        sodium chloride flush 0.9 % injection 5-40 mL  5-40 mL IntraVENous PRN Genaro Baker MD        0.9 % sodium chloride infusion  25 mL IntraVENous PRN Genaro Baker MD        ondansetron (ZOFRAN-ODT) disintegrating tablet 4 mg  4 mg Oral Q8H PRN Genaro Baker MD        Or    [facility]ron [facility]) injection 4 mg  4 mg IntraVENous Q6H PRN Genaro Baker MD        polyethylene glycol [facility]) packet 17 g  17 g Oral Daily PRN Genaro Baker MD        acetaminophen (TYLENOL) tablet 650 mg  650 mg Oral Q6H PRN Genaro Baker MD   650 mg at 09/14/21 1715    Or    acetaminophen (TYLENOL) suppository 650 mg  650 mg Rectal Q6H PRN Genaro Baker MD        dextrose 50 % IV solution  12.5 g IntraVENous PRN Genaro Baker MD        potassium chloride 10 mEq/100 mL IVPB (Peripheral Line)  10 mEq IntraVENous PRN Genaro Baker  mL/hr at 09/14/21 1715 10 mEq at 09/14/21 1715    magnesium sulfate 1000 mg in dextrose 5% 100 mL IVPB  1,000 mg IntraVENous PRN Genaro Baker MD        sodium phosphate 10 mmol in dextrose 5 % 250 mL IVPB  10 mmol IntraVENous PRN Genaro Baker MD        Or    sodium phosphate 15 mmol in dextrose 5 % 250 mL IVPB  15 mmol IntraVENous PRN Genaro Baker MD        Or    sodium phosphate 20 mmol in dextrose 5 % 500 mL IVPB  20 mmol IntraVENous PRN Genaro Baker MD        0.45 % sodium chloride infusion   IntraVENous Continuous Genaro Baker MD   Stopped at 09/14/21 1427    dextrose 5 % and 0.45 % sodium chloride infusion   IntraVENous Continuous PRN Genaro Baker  mL/hr at 09/14/21 1415 New Bag at 09/14/21 1415    insulin regular (HUMULIN R;NOVOLIN R) 100 Units in sodium chloride 0.9 % 100 mL infusion  0.1 Units/kg/hr IntraVENous Continuous Genaro Baker MD 3.8 mL/hr at 09/14/21 1704 3.76 Units/hr at 09/14/21 1704    piperacillin-tazobactam (ZOSYN) 3,375 mg in dextrose 5 % 50 mL IVPB (mini-bag) 3,375 mg IntraVENous Q8H Jude Hickman MD        oxyCODONE (ROXICODONE) immediate release tablet 5 mg  5 mg Oral Q4H PRN Jude Hickman MD   5 mg at 09/14/21 1715    DAPTOmycin (CUBICIN) 235 mg in sodium chloride 0.9 % 50 mL IVPB  4 mg/kg (Ideal) IntraVENous Q24H Sabrina Gregorio MD           Allergies: Allergies   Allergen Reactions    Bactrim [Sulfamethoxazole-Trimethoprim] Swelling    Adhesive Tape Rash       Problem List:    Patient Active Problem List   Diagnosis Code    IDDM (insulin dependent diabetes mellitus) [id number]    Lupus ([facility] M32.9    B[facility] disorder, mixed [facility] F31.60    Post traumatic stress disorder (PTSD) F43.10    Acute cystitis with hematuria N30.01    Hyperglycemia R73.9    Suicidal ideation R45.851    Arthritis M19.90    Chronic back pain M54.9, G89.29    Acid reflux K21.9    History of stroke Z86.73    Polysubstance abuse ([facility] F19.10    Bipolar 1 disorder (HCC) F31.9    Cocaine abuse ([facility] F14.10    Chest pain R07.9    Acute non-recurrent maxillary sinusitis J01.00    Acute respiratory failure with hypercapnia ([facility]) J96.02    Alcohol use disorder, severe, dependence ([facility]) F10.20    Asthma exacerbation attacks J45. 901    Bilateral edema of lower extremity R60.0    Bipolar 1 disorder, depressed, severe (HCC) F31.4    BMI 34.0-34.9,adult Z68.34    Cannabis use disorder, severe, dependence (HCC) F12.20    Cocaine use disorder, severe, dependence ([facility]) F14.20    Dizziness R42    Dyslipidemia E78.5    Family history of colon cancer Z80.0    History of lupus [id number]    Homicidal ideation R45.850    Hypotension I95.9    Neuropathy G62.9    Normocytic anemia D64.9    Recurrent depression (HCC) F33.9    Recurrent major depressive disorder, in partial remission (HCC) F33.41    Severe recurrent major depression with psychotic features ([facility] F33.3    Severe recurrent major depression without psychotic features ([facility] F33.2    Upper GI bleed K92.2    Vitamin D deficiency E55.9    White matter changes [id number]    Gastroesophageal reflux disease K21.9    Stroke (cerebrum) ([facility]) I63.9    Rib lesion M89.9    Diabetes mellitus type 2 in obese ([facility]) E11.69, E66.9    YAEL (generalized anxiety disorder) F41.1    Posttraumatic stress disorder F43.10    Suicidal intent R45.851    Leg weakness, bilateral R29.898    Poorly controlled diabetes mellitus ([address] E11.65    Acute kidney injury ([address] N17.9    Felon of finger L03.019    Osteomyelitis ([facility]) M86.9    Recurrent falls R29.6    Seasonal allergic rhinitis J30.2    Fibromyalgia M79.7    Flexor tenosynovitis of finger M65.9       Past Medical History:        Diagnosis Date    Acid reflux 5/29/2017    Acute cystitis with hematuria 10/11/2016    Acute non-recurrent maxillary sinusitis 11/28/2017    Asthma     Bipolar 1 disorder ([address] 1/4/2018    Bipolar disorder, mixed ([address]     BMI 34.0-34.9,adult 11/28/2017    Cannabis use disorder, severe, dependence ([address] 12/19/2017    Cerebrovascular accident (CVA) ([address] 6/14/2017    Chest pain 11/5/2016    Chronic renal insufficiency     Cocaine abuse ([address] 1/5/2018    COVID-19 virus RNA test result unknown     DDD (degenerative disc disease), cervical     Diabetes mellitus ([address]     Dizziness 6/13/2017    Fibromyalgia     History of stroke 9/9/2017    Homicidal ideation 11/6/2017    Hyperglycemia     Hypertension     Hypotension 1/18/2019    IDDM (insulin dependent diabetes mellitus) 12/21/2015    Lupus ([address]     Migraine     Neuropathy     Neuropathy     Polysubstance abuse ([address] 9/17/2017    Post traumatic stress disorder (PTSD)     Posttraumatic stress disorder 5/29/2017    Recurrent depression ([address] 5/29/2017    Recurrent major depressive disorder, in partial remission ([address] 11/28/2017    Screening mammogram, encounter for 11/28/2017    Severe recurrent major depression with psychotic features ([address] 12/19/2017    [facility] without [facility] 12/19/2017    Stroke (cerebrum) [facility] 6/14/2017    Stroke ([facility]     per chart notes    Suicidal ideation     Suicidal intent 3/10/2017    Vitamin D deficiency 11/28/2017    White matter changes 6/13/2017       Past Surgical History:        Procedure Laterality Date    ABDOMEN SURGERY      drain tube    ABSCESS DRAINAGE      right buttock    AMPUTATION Left 03/13/2021    ring finger left    CATARACT REMOVAL WITH IMPLANT Bilateral     CHEST TUBE INSERTION      FINGER AMPUTATION Left 3/13/2021    LEFT RING FINER AMPUTATION performed by Meghna Clark MD at [address] Right 09/14/2021    I&D index finger    LASIK Bilateral        Social History:    Social History     Tobacco Use    Smoking status: Never Smoker    Smokeless tobacco: Never Used   Substance Use Topics    Alcohol use: Not Currently                                Counseling given: Not Answered      Vital Signs (Current): There were no vitals filed for this visit.                                            BP Readings from Last 3 Encounters:   09/14/21 132/75   09/14/21 (!) 118/59   08/18/21 (!) 114/94       NPO Status:                                                                                 BMI:   Wt Readings from Last 3 Encounters:   09/14/21 238 lb (108 kg)   08/18/21 238 lb (108 kg)   08/09/21 238 lb (108 kg)     There is no height or weight on file to calculate BMI.    CBC:   Lab Results   Component Value Date    WBC 9.4 09/14/2021    RBC 3.40 09/14/2021    HGB 10.7 09/14/2021    HCT 36.2 09/14/2021    .5 09/14/2021    RDW 13.2 09/14/2021    PLT 59 09/14/2021       CMP:   Lab Results   Component Value Date     09/14/2021    K 3.8 09/14/2021    CL 95 09/14/2021    CO2 21 09/14/2021    BUN 11 09/14/2021    CREATININE 0.99 09/14/2021    GFRAA >60 09/14/2021    LABGLOM 58 09/14/2021    GLUCOSE 185 09/14/2021    PROT 7.3 09/14/2021    CALCIUM 9.3 09/14/2021    BILITOT 0.37 09/14/2021    ALKPHOS 161 09/14/2021    AST 10 09/14/2021    ALT 7 09/14/2021       POC Tests:   Recent Labs     09/14/21  1607   POCGLU 197*       Coags:   Lab Results   Component Value Date    APTT 22.4 07/07/2020       HCG (If Applicable): No results found for: PREGTESTUR, PREGSERUM, HCG, HCGQUANT     ABGs: No results found for: PHART, PO2ART, SCF0DDQ, ZVE8PBD, BEART, G3HRDLSN     Type & Screen (If Applicable):  No results found for: LABABO, LABRH    Drug/Infectious Status (If Applicable):  Lab Results   Component Value Date    HEPCAB NONREACTIVE 09/14/2021       COVID-19 Screening (If Applicable):   Lab Results   Component Value Date    COVID19 Not Detected 09/14/2021           Anesthesia Evaluation  Patient summary reviewed and Nursing notes reviewed no history of anesthetic complications:   Airway: Mallampati: III  TM distance: >3 FB   Neck ROM: full  Mouth opening: > = 3 FB Dental:          Pulmonary:normal exam    (+) asthma: seasonal asthma,                            Cardiovascular:    (+) hypertension: no interval change,       ECG reviewed  Rhythm: regular  Rate: normal                    Neuro/Psych:   (+) CVA: no interval change, neuromuscular disease:, headaches: migraine headaches, psychiatric history:depression/anxiety             GI/Hepatic/Renal:   (+) GERD: no interval change,           Endo/Other:    (+) DiabetesType II DM, using insulin, : arthritis: OA., .                 Abdominal:   (+) obese,           Vascular: negative vascular ROS. Other Findings:             Anesthesia Plan      general     ASA 3       Induction: intravenous. Anesthetic plan and risks discussed with patient. Plan discussed with CRNA and attending.                   Evelia Patrick, LAILA - CRNA   9/14/2021

## 2021-09-14 NOTE — BRIEF OP NOTE
Brief Postoperative Note      Patient: Kya Delgado  YOB: 1965  MRN: [id number]    Date of Procedure: 9/14/2021    Pre-Op Diagnosis: INFECTION RIGHT INDEX FINGER    Post-Op Diagnosis: Same       Procedure(s):  I&D INDEX FINGER    Surgeon(s):  Cholo Escudero MD    Assistant:  * No surgical staff found *    Anesthesia: General    Estimated Blood Loss (mL): Minimal    Complications: None    Specimens:   ID Type Source Tests Collected by Time Destination   1 : Abscess Right Index Finger Swab Finger CULTURE, ANAEROBIC AND AEROBIC Cholo Escudero MD 9/14/2021 1506        Implants:  * No implants in log *      Drains: * No LDAs found *    Findings:     Electronically signed by Cholo Escudero MD on 9/14/2021 at 1:12 PM

## 2021-09-14 NOTE — ED NOTES
Pt arrives to ED via self. Pt states she fell pta and now has R index pain. Pt has noticeable swelling to the R index finger. Pt also has redness. Pt is tearful because she is scared of getting covid. Pt denies HI/SI. Pt vss. Pt refusing to get onto stretcher.        Ciro Oneil RN  09/14/21 2201 - - -

## 2021-09-14 NOTE — CONSULTS
[facility] -   Infectious diseases evaluation  admission date 9/14/2021    reason for consultation:   Hand infection    Impression :   Current:  · DKA  · DM on insulin  · SLE  · Depressed  · Right 2nd finger infected soft tissues  ·  and open ulcer w drain age x 2 weeks pTA  · Right hand cellultiis    Other:  ·   Discussion / summary of stay / plan of care   ·   Recommendations   · Avoid antibiotics that would affect the kidneys and the antibiotic was DKA  · Continue Zosyn, stop vancomycin  · Start daptomycin, get a CPK  · Adjust antibiotics per final culture of the pus  · So far cultures gram-positive rods  · Blood culture pending    Infection Control Recommendations   · [place] Precautions  · Contact Isolation       Antimicrobial Stewardship Recommendations   · Simplification of therapy  · Targeted therapy  · Per Kg dosing      Coordination ofOutpatient Care:   · Estimated Length of IV antimicrobials:  · Patient will need Midline / picc Catheter Insertion:   · Patient will need SNF:  · Patient will need outpatient wound care:     History of Present Illness:   Initial history:  Kady Oneil is a 49y.o.-year-old female   Presented with right second finger pain and progressive discoloration found to have an abscess over the right second finger associated with some redness over the palm in the same hand, increased pain, patient presented with DKA, area was lanced and packed, patient seen for evaluation of antibiotics  She is emotional, depressed, lost her son her daughter and her  to the short period of time.                 Interval changes  9/14/2021   Patient Vitals for the past 8 hrs:   BP Temp Temp src Pulse Resp SpO2 Height   09/14/21 1315 -- -- -- -- -- -- 5' 6\" (1.676 m)   09/14/21 1200 127/81 -- -- 104 18 -- --   09/14/21 1115 (!) 127/94 97.2 °F (36.2 °C) -- 102 16 98 % --   09/14/21 1100 (!) 120/90 -- -- 102 16 96 % --   09/14/21 1045 119/67 -- -- 102 17 96 % --   09/14/21 1030 111/87 -- -- 100 18 98 % --   09/14/21 1015 120/88 -- -- 110 17 98 % --   09/14/21 0956 (!) 180/100 98.1 °F (36.7 °C) Temporal 115 22 98 % --   09/14/21 0855 118/70 96.8 °F (36 °C) Temporal 115 20 98 % --   09/14/21 0810 (!) 115/91 -- -- -- 18 -- --   09/14/21 0808 (!) 115/91 -- -- -- -- -- --       Summary of relevant labs:  Labs:  WBC 7  Creat normal    Micro:  BC  Wound cx GPR  Imaging:  R hand xray 9/14/21  Marked soft tissue swelling involving the 2nd finger and dorsum of the hand. No acute osseous abnormality evident       I have personally reviewed the past medical history, past surgical history, medications, social history, and family history, and I haveupdated the database accordingly. Allergies:   Bactrim [sulfamethoxazole-trimethoprim] and Adhesive tape     Review of Systems:     Review of Systems   Constitutional: Positive for fatigue. Negative for activity change. HENT: Negative for congestion. Eyes: Negative for photophobia. Respiratory: Negative for apnea, cough and shortness of breath. Cardiovascular: Negative for chest pain. Gastrointestinal: Negative for abdominal distention. Endocrine: Negative for polyuria. Genitourinary: Negative for dysuria and urgency. Musculoskeletal: Positive for arthralgias. Skin: Positive for color change and wound. Allergic/Immunologic: Negative for immunocompromised state. Neurological: Negative for dizziness. Hematological: Negative for adenopathy. Psychiatric/Behavioral: Negative for agitation. Physical Examination :       Physical Exam  Constitutional:       Appearance: Normal appearance. HENT:      Head: Normocephalic and atraumatic. Nose: Nose normal.      Mouth/Throat:      Mouth: Mucous membranes are moist.   Eyes:      General: No scleral icterus. Conjunctiva/sclera: Conjunctivae normal.      Pupils: Pupils are equal, round, and reactive to light.    Cardiovascular:      Rate and Rhythm: Normal rate and regular rhythm. Heart sounds: Normal heart sounds. No murmur heard. Pulmonary:      Effort: No respiratory distress. Breath sounds: Normal breath sounds. Abdominal:      General: There is no distension. Palpations: Abdomen is soft. Tenderness: There is no abdominal tenderness. Genitourinary:     Comments: No cullen  Musculoskeletal:         General: No swelling. Cervical back: Neck supple. No rigidity. Skin:     General: Skin is dry. Coloration: Skin is not jaundiced. Findings: Erythema and lesion present. Neurological:      Mental Status: She is alert and oriented to person, place, and time. Cranial Nerves: No cranial nerve deficit. Psychiatric:         Mood and Affect: Mood normal.         Thought Content:  Thought content normal.         Past Medical History:     Past Medical History:   Diagnosis Date    Acid reflux 5/29/2017    Acute cystitis with hematuria 10/11/2016    Acute non-recurrent maxillary sinusitis 11/28/2017    Asthma     Bipolar 1 disorder ([address] 1/4/2018    Bipolar disorder, mixed ([address]     BMI 34.0-34.9,adult 11/28/2017    Cannabis use disorder, severe, dependence ([address] 12/19/2017    Cerebrovascular accident (CVA) ([address] 6/14/2017    Chest pain 11/5/2016    Chronic renal insufficiency     Cocaine abuse ([address] 1/5/2018    COVID-19 virus RNA test result unknown     DDD (degenerative disc disease), cervical     Diabetes mellitus ([address]     Dizziness 6/13/2017    Fibromyalgia     History of stroke 9/9/2017    Homicidal ideation 11/6/2017    Hyperglycemia     Hypertension     Hypotension 1/18/2019    IDDM (insulin dependent diabetes mellitus) 12/21/2015    Lupus ([address]     Migraine     Neuropathy     Neuropathy     Polysubstance abuse ([address] 9/17/2017    Post traumatic stress disorder (PTSD)     Posttraumatic stress disorder 5/29/2017    Recurrent depression ([address] 5/29/2017    Recurrent major depressive disorder, in partial remission ([facility] 11/28/2017    Screening mammogram, encounter for 11/28/2017    Severe recurrent major depression with psychotic features ([facility] 12/19/2017    Severe recurrent major depression without psychotic features ([facility] 12/19/2017    Stroke (cerebrum) ([facility] 6/14/2017    Stroke ([facility]     per chart notes    Suicidal ideation     Suicidal intent 3/10/2017    Vitamin D deficiency 11/28/2017    White matter changes 6/13/2017       Past Surgical  History:     Past Surgical History:   Procedure Laterality Date    ABDOMEN SURGERY      drain tube    ABSCESS DRAINAGE      right buttock    AMPUTATION Left 03/13/2021    ring finger left    CATARACT REMOVAL WITH IMPLANT Bilateral     CHEST TUBE INSERTION      FINGER AMPUTATION Left 3/13/2021    LEFT RING FINER AMPUTATION performed by Jie Garcia MD at [address] Right 09/14/2021    I&D index finger    LASIK Bilateral        Medications:      sodium chloride  1,000 mL IntraVENous Once    budesonide-formoterol  2 puff Inhalation BID    pantoprazole  20 mg Oral BID AC    traZODone  150 mg Oral Nightly    pregabalin  75 mg Oral BID    [START ON 9/15/2021] venlafaxine  150 mg Oral Daily with breakfast    sodium chloride flush  5-40 mL IntraVENous 2 times per day    piperacillin-tazobactam  3,375 mg IntraVENous Q8H    vancomycin  1,000 mg IntraVENous Q12H    vancomycin (VANCOCIN) intermittent dosing (placeholder)   Other RX Placeholder       Social History:     Social History     Socioeconomic History    Marital status: Legally      Spouse name: Not on file    Number of children: Not on file    Years of education: Not on file    Highest education level: Not on file   Occupational History    Not on file   Tobacco Use    Smoking status: Never Smoker    Smokeless tobacco: Never Used   Vaping Use    Vaping Use: Never used   Substance and Sexual Activity    Alcohol use: Not Currently    Drug use: Yes     Types: Marijuana, Cocaine     Comment: + Cocaine 2/2021 also, see Care Everywhere UDS    Sexual activity: Not Currently     Partners: Male   Other Topics Concern    Not on file   Social History Narrative    Not on file     Social Determinants of Health     Financial Resource Strain: High Risk    Difficulty of Paying Living Expenses: Hard   Food Insecurity: Food Insecurity Present    Worried About Running Out of Food in the Last Year: Often true    Kelin of Food in the Last Year: Often true   Transportation Needs: Unmet Transportation Needs    Lack of Transportation (Medical):  Yes    Lack of Transportation (Non-Medical): Yes   Physical Activity: Inactive    Days of Exercise per Week: 0 days    Minutes of Exercise per Session: 0 min   Stress: Stress Concern Present    Feeling of Stress : Rather much   Social Connections:     Frequency of Communication with Friends and Family:     Frequency of Social Gatherings with Friends and Family:     Attends Gnosticism Services:     Active Member of Clubs or Organizations:     Attends Club or Organization Meetings:     Marital Status:    Intimate Partner Violence:     Fear of Current or Ex-Partner:     Emotionally Abused:     Physically Abused:     Sexually Abused:        Family History:     Family History   Problem Relation Age of Onset    Diabetes Mother     Stroke Mother     Diabetes Father     Diabetes Sister     Diabetes Brother     Other Son         GSW    No Known Problems Sister       Medical Decision Making:   I have independently reviewed/ordered the following labs:    CBC with Differential:   Recent Labs     09/14/21  0703   WBC 9.4   HGB 10.7*   HCT 36.2*   PLT 59*   LYMPHOPCT 9*   MONOPCT 9     BMP:  Recent Labs     09/14/21  0703   *   K 4.9   CL 87*   CO2 16*   BUN 11   CREATININE 1.08*     Hepatic Function Panel: No results for input(s): PROT, LABALBU, BILIDIR, IBILI, BILITOT, ALKPHOS, ALT, AST in the last 72 hours. No results for input(s): RPR in the last 72 hours. No results for input(s): HIV in the last 72 hours. No results for input(s): BC in the last 72 hours. Lab Results   Component Value Date    CREATININE 1.08 09/14/2021    GLUCOSE 719 09/14/2021       Detailed results: Thank you for allowing us to participate in the care of this patient. Please call with questions. This note is created with the assistance of a speech recognition program.  While intending to generate adocument that actually reflects the content of the visit, the document can still have some errors including those of syntax and sound a like substitutions which may escape proof reading. It such instances, actual meaningcan be extrapolated by contextual diversion.     Shakira Montilla MD  Office: [phone]  Perfect serve / office [phone]

## 2021-09-14 NOTE — ED NOTES
Pt changed into gown and hospital pants for  OR. Will house supervisor came down to place an ultrasound IV. Pt's covid swab sent. Once results in OR will be coming to get pt.       Guanaco Perez RN  09/14/21 8725

## 2021-09-14 NOTE — ED NOTES
Pt ambulated to restroom with slow but steady gait.  Pt given paper scrubs to change into due to pants being wet     Kavita Mendez RN  09/14/21 3354

## 2021-09-15 ENCOUNTER — APPOINTMENT (OUTPATIENT)
Dept: GENERAL RADIOLOGY | Age: 56
DRG: 316 | End: 2021-09-15
Payer: COMMERCIAL

## 2021-09-15 ENCOUNTER — APPOINTMENT (OUTPATIENT)
Dept: CT IMAGING | Age: 56
DRG: 316 | End: 2021-09-15
Payer: COMMERCIAL

## 2021-09-15 ENCOUNTER — ANESTHESIA (OUTPATIENT)
Dept: OPERATING ROOM | Age: 56
DRG: 316 | End: 2021-09-15
Payer: COMMERCIAL

## 2021-09-15 VITALS — OXYGEN SATURATION: 100 % | SYSTOLIC BLOOD PRESSURE: 88 MMHG | TEMPERATURE: 97 F | DIASTOLIC BLOOD PRESSURE: 48 MMHG

## 2021-09-15 PROBLEM — E11.10 DKA (DIABETIC KETOACIDOSES): Status: ACTIVE | Noted: 2021-09-15

## 2021-09-15 LAB
ABSOLUTE EOS #: 0.12 K/UL (ref 0–0.44)
ABSOLUTE IMMATURE GRANULOCYTE: 0.06 K/UL (ref 0–0.3)
ABSOLUTE LYMPH #: 1.7 K/UL (ref 1.1–3.7)
ABSOLUTE MONO #: 1.47 K/UL (ref 0.1–1.2)
ABSOLUTE RETIC #: 0.18 M/UL (ref 0.03–0.08)
ANION GAP SERPL CALCULATED.3IONS-SCNC: 16 MMOL/L (ref 9–17)
ANION GAP SERPL CALCULATED.3IONS-SCNC: NORMAL MMOL/L (ref 9–17)
BASOPHILS # BLD: 0 % (ref 0–2)
BASOPHILS ABSOLUTE: <0.03 K/UL (ref 0–0.2)
BUN BLDV-MCNC: 11 MG/DL (ref 6–20)
BUN BLDV-MCNC: NORMAL MG/DL (ref 6–20)
BUN/CREAT BLD: ABNORMAL (ref 9–20)
BUN/CREAT BLD: NORMAL (ref 9–20)
CALCIUM SERPL-MCNC: 8.4 MG/DL (ref 8.6–10.4)
CALCIUM SERPL-MCNC: NORMAL MG/DL (ref 8.6–10.4)
CHLORIDE BLD-SCNC: 98 MMOL/L (ref 98–107)
CHLORIDE BLD-SCNC: NORMAL MMOL/L (ref 98–107)
CO2: 20 MMOL/L (ref 20–31)
CO2: NORMAL MMOL/L (ref 20–31)
CREAT SERPL-MCNC: 1.4 MG/DL (ref 0.5–0.9)
CREAT SERPL-MCNC: NORMAL MG/DL (ref 0.5–0.9)
DIFFERENTIAL TYPE: ABNORMAL
EOSINOPHILS RELATIVE PERCENT: 1 % (ref 1–4)
GFR AFRICAN AMERICAN: 47 ML/MIN
GFR AFRICAN AMERICAN: NORMAL ML/MIN
GFR NON-AFRICAN AMERICAN: 39 ML/MIN
GFR NON-AFRICAN AMERICAN: NORMAL ML/MIN
GFR SERPL CREATININE-BSD FRML MDRD: ABNORMAL ML/MIN/{1.73_M2}
GFR SERPL CREATININE-BSD FRML MDRD: ABNORMAL ML/MIN/{1.73_M2}
GFR SERPL CREATININE-BSD FRML MDRD: NORMAL ML/MIN/{1.73_M2}
GFR SERPL CREATININE-BSD FRML MDRD: NORMAL ML/MIN/{1.73_M2}
GLUCOSE BLD-MCNC: 146 MG/DL (ref 65–105)
GLUCOSE BLD-MCNC: 154 MG/DL (ref 65–105)
GLUCOSE BLD-MCNC: 167 MG/DL (ref 70–99)
GLUCOSE BLD-MCNC: 185 MG/DL (ref 65–105)
GLUCOSE BLD-MCNC: 290 MG/DL (ref 65–105)
GLUCOSE BLD-MCNC: 318 MG/DL (ref 65–105)
GLUCOSE BLD-MCNC: 366 MG/DL (ref 65–105)
GLUCOSE BLD-MCNC: NORMAL MG/DL (ref 70–99)
HCT VFR BLD CALC: 28.2 % (ref 36.3–47.1)
HEMOGLOBIN: 8.9 G/DL (ref 11.9–15.1)
IMMATURE GRANULOCYTES: 1 %
IMMATURE RETIC FRACT: 21 % (ref 2.7–18.3)
LYMPHOCYTES # BLD: 16 % (ref 24–43)
MAGNESIUM: 1.8 MG/DL (ref 1.6–2.6)
MAGNESIUM: NORMAL MG/DL (ref 1.6–2.6)
MCH RBC QN AUTO: 31.9 PG (ref 25.2–33.5)
MCHC RBC AUTO-ENTMCNC: 31.6 G/DL (ref 28.4–34.8)
MCV RBC AUTO: 101.1 FL (ref 82.6–102.9)
MONOCYTES # BLD: 14 % (ref 3–12)
NRBC AUTOMATED: 0 PER 100 WBC
PDW BLD-RTO: 13.2 % (ref 11.8–14.4)
PHOSPHORUS: 3 MG/DL (ref 2.6–4.5)
PHOSPHORUS: NORMAL MG/DL (ref 2.6–4.5)
PLATELET # BLD: 329 K/UL (ref 138–453)
PLATELET ESTIMATE: ABNORMAL
PMV BLD AUTO: 11 FL (ref 8.1–13.5)
POTASSIUM SERPL-SCNC: 3.7 MMOL/L (ref 3.7–5.3)
POTASSIUM SERPL-SCNC: NORMAL MMOL/L (ref 3.7–5.3)
RBC # BLD: 2.79 M/UL (ref 3.95–5.11)
RBC # BLD: ABNORMAL 10*6/UL
RETIC %: 6.3 % (ref 0.5–1.9)
RETIC HEMOGLOBIN: 33.9 PG (ref 28.2–35.7)
SEG NEUTROPHILS: 67 % (ref 36–65)
SEGMENTED NEUTROPHILS ABSOLUTE COUNT: 7.01 K/UL (ref 1.5–8.1)
SODIUM BLD-SCNC: 134 MMOL/L (ref 135–144)
SODIUM BLD-SCNC: NORMAL MMOL/L (ref 135–144)
SURGICAL PATHOLOGY REPORT: NORMAL
WBC # BLD: 10.4 K/UL (ref 3.5–11.3)
WBC # BLD: ABNORMAL 10*3/UL

## 2021-09-15 PROCEDURE — 99232 SBSQ HOSP IP/OBS MODERATE 35: CPT | Performed by: INTERNAL MEDICINE

## 2021-09-15 PROCEDURE — 6370000000 HC RX 637 (ALT 250 FOR IP)

## 2021-09-15 PROCEDURE — 6370000000 HC RX 637 (ALT 250 FOR IP): Performed by: STUDENT IN AN ORGANIZED HEALTH CARE EDUCATION/TRAINING PROGRAM

## 2021-09-15 PROCEDURE — 2060000000 HC ICU INTERMEDIATE R&B

## 2021-09-15 PROCEDURE — 6360000002 HC RX W HCPCS: Performed by: PLASTIC SURGERY

## 2021-09-15 PROCEDURE — 70450 CT HEAD/BRAIN W/O DYE: CPT

## 2021-09-15 PROCEDURE — 6360000002 HC RX W HCPCS: Performed by: NURSE ANESTHETIST, CERTIFIED REGISTERED

## 2021-09-15 PROCEDURE — 2580000003 HC RX 258: Performed by: PLASTIC SURGERY

## 2021-09-15 PROCEDURE — 2580000003 HC RX 258: Performed by: NURSE ANESTHETIST, CERTIFIED REGISTERED

## 2021-09-15 PROCEDURE — 6370000000 HC RX 637 (ALT 250 FOR IP): Performed by: PLASTIC SURGERY

## 2021-09-15 PROCEDURE — 80048 BASIC METABOLIC PNL TOTAL CA: CPT

## 2021-09-15 PROCEDURE — 94640 AIRWAY INHALATION TREATMENT: CPT

## 2021-09-15 PROCEDURE — 1200000000 HC SEMI PRIVATE

## 2021-09-15 PROCEDURE — 7100000000 HC PACU RECOVERY - FIRST 15 MIN: Performed by: PLASTIC SURGERY

## 2021-09-15 PROCEDURE — 2580000003 HC RX 258: Performed by: STUDENT IN AN ORGANIZED HEALTH CARE EDUCATION/TRAINING PROGRAM

## 2021-09-15 PROCEDURE — 85045 AUTOMATED RETICULOCYTE COUNT: CPT

## 2021-09-15 PROCEDURE — 6360000002 HC RX W HCPCS: Performed by: ANESTHESIOLOGY

## 2021-09-15 PROCEDURE — 2709999900 HC NON-CHARGEABLE SUPPLY: Performed by: PLASTIC SURGERY

## 2021-09-15 PROCEDURE — 6360000002 HC RX W HCPCS: Performed by: STUDENT IN AN ORGANIZED HEALTH CARE EDUCATION/TRAINING PROGRAM

## 2021-09-15 PROCEDURE — 3600000013 HC SURGERY LEVEL 3 ADDTL 15MIN: Performed by: PLASTIC SURGERY

## 2021-09-15 PROCEDURE — 7100000001 HC PACU RECOVERY - ADDTL 15 MIN: Performed by: PLASTIC SURGERY

## 2021-09-15 PROCEDURE — 3700000000 HC ANESTHESIA ATTENDED CARE: Performed by: PLASTIC SURGERY

## 2021-09-15 PROCEDURE — 82947 ASSAY GLUCOSE BLOOD QUANT: CPT

## 2021-09-15 PROCEDURE — 83735 ASSAY OF MAGNESIUM: CPT

## 2021-09-15 PROCEDURE — 36415 COLL VENOUS BLD VENIPUNCTURE: CPT

## 2021-09-15 PROCEDURE — 71045 X-RAY EXAM CHEST 1 VIEW: CPT

## 2021-09-15 PROCEDURE — 0LB70ZZ EXCISION OF RIGHT HAND TENDON, OPEN APPROACH: ICD-10-PCS | Performed by: PLASTIC SURGERY

## 2021-09-15 PROCEDURE — 84100 ASSAY OF PHOSPHORUS: CPT

## 2021-09-15 PROCEDURE — 0JBJ0ZZ EXCISION OF RIGHT HAND SUBCUTANEOUS TISSUE AND FASCIA, OPEN APPROACH: ICD-10-PCS | Performed by: PLASTIC SURGERY

## 2021-09-15 PROCEDURE — 6360000002 HC RX W HCPCS

## 2021-09-15 PROCEDURE — 3700000001 HC ADD 15 MINUTES (ANESTHESIA): Performed by: PLASTIC SURGERY

## 2021-09-15 PROCEDURE — 3600000003 HC SURGERY LEVEL 3 BASE: Performed by: PLASTIC SURGERY

## 2021-09-15 PROCEDURE — 2500000003 HC RX 250 WO HCPCS: Performed by: NURSE ANESTHETIST, CERTIFIED REGISTERED

## 2021-09-15 PROCEDURE — 85025 COMPLETE CBC W/AUTO DIFF WBC: CPT

## 2021-09-15 RX ORDER — INSULIN GLARGINE 100 [IU]/ML
20 INJECTION, SOLUTION SUBCUTANEOUS ONCE
Status: DISCONTINUED | OUTPATIENT
Start: 2021-09-15 | End: 2021-09-15

## 2021-09-15 RX ORDER — FENTANYL CITRATE 50 UG/ML
INJECTION, SOLUTION INTRAMUSCULAR; INTRAVENOUS PRN
Status: DISCONTINUED | OUTPATIENT
Start: 2021-09-15 | End: 2021-09-15 | Stop reason: SDUPTHER

## 2021-09-15 RX ORDER — SODIUM CHLORIDE 9 MG/ML
INJECTION, SOLUTION INTRAVENOUS CONTINUOUS PRN
Status: DISCONTINUED | OUTPATIENT
Start: 2021-09-15 | End: 2021-09-15 | Stop reason: SDUPTHER

## 2021-09-15 RX ORDER — POTASSIUM CHLORIDE 20 MEQ/1
40 TABLET, EXTENDED RELEASE ORAL ONCE
Status: COMPLETED | OUTPATIENT
Start: 2021-09-15 | End: 2021-09-15

## 2021-09-15 RX ORDER — LIDOCAINE HYDROCHLORIDE 10 MG/ML
INJECTION, SOLUTION INFILTRATION; PERINEURAL PRN
Status: DISCONTINUED | OUTPATIENT
Start: 2021-09-15 | End: 2021-09-15 | Stop reason: SDUPTHER

## 2021-09-15 RX ORDER — HYDRALAZINE HYDROCHLORIDE 20 MG/ML
5 INJECTION INTRAMUSCULAR; INTRAVENOUS EVERY 10 MIN PRN
Status: DISCONTINUED | OUTPATIENT
Start: 2021-09-15 | End: 2021-09-15 | Stop reason: HOSPADM

## 2021-09-15 RX ORDER — FENTANYL CITRATE 50 UG/ML
25 INJECTION, SOLUTION INTRAMUSCULAR; INTRAVENOUS EVERY 5 MIN PRN
Status: COMPLETED | OUTPATIENT
Start: 2021-09-15 | End: 2021-09-15

## 2021-09-15 RX ORDER — LIDOCAINE HYDROCHLORIDE 10 MG/ML
INJECTION, SOLUTION EPIDURAL; INFILTRATION; INTRACAUDAL; PERINEURAL PRN
Status: DISCONTINUED | OUTPATIENT
Start: 2021-09-15 | End: 2021-09-15 | Stop reason: SDUPTHER

## 2021-09-15 RX ORDER — DIPHENHYDRAMINE HYDROCHLORIDE 50 MG/ML
12.5 INJECTION INTRAMUSCULAR; INTRAVENOUS
Status: DISCONTINUED | OUTPATIENT
Start: 2021-09-15 | End: 2021-09-15 | Stop reason: HOSPADM

## 2021-09-15 RX ORDER — MAGNESIUM HYDROXIDE 1200 MG/15ML
LIQUID ORAL CONTINUOUS PRN
Status: COMPLETED | OUTPATIENT
Start: 2021-09-15 | End: 2021-09-15

## 2021-09-15 RX ORDER — INSULIN GLARGINE 100 [IU]/ML
20 INJECTION, SOLUTION SUBCUTANEOUS 2 TIMES DAILY
Status: DISCONTINUED | OUTPATIENT
Start: 2021-09-15 | End: 2021-09-17

## 2021-09-15 RX ORDER — PROCHLORPERAZINE EDISYLATE 5 MG/ML
5 INJECTION INTRAMUSCULAR; INTRAVENOUS
Status: DISCONTINUED | OUTPATIENT
Start: 2021-09-15 | End: 2021-09-15 | Stop reason: HOSPADM

## 2021-09-15 RX ORDER — SODIUM CHLORIDE 9 MG/ML
INJECTION, SOLUTION INTRAVENOUS CONTINUOUS
Status: DISCONTINUED | OUTPATIENT
Start: 2021-09-15 | End: 2021-09-20

## 2021-09-15 RX ORDER — INSULIN GLARGINE 100 [IU]/ML
15 INJECTION, SOLUTION SUBCUTANEOUS 2 TIMES DAILY
Status: DISCONTINUED | OUTPATIENT
Start: 2021-09-15 | End: 2021-09-15

## 2021-09-15 RX ORDER — PROPOFOL 10 MG/ML
INJECTION, EMULSION INTRAVENOUS PRN
Status: DISCONTINUED | OUTPATIENT
Start: 2021-09-15 | End: 2021-09-15 | Stop reason: SDUPTHER

## 2021-09-15 RX ORDER — MIDAZOLAM HYDROCHLORIDE 1 MG/ML
INJECTION INTRAMUSCULAR; INTRAVENOUS PRN
Status: DISCONTINUED | OUTPATIENT
Start: 2021-09-15 | End: 2021-09-15 | Stop reason: SDUPTHER

## 2021-09-15 RX ORDER — LABETALOL HYDROCHLORIDE 5 MG/ML
5 INJECTION, SOLUTION INTRAVENOUS EVERY 10 MIN PRN
Status: DISCONTINUED | OUTPATIENT
Start: 2021-09-15 | End: 2021-09-15 | Stop reason: HOSPADM

## 2021-09-15 RX ORDER — INSULIN GLARGINE 100 [IU]/ML
12 INJECTION, SOLUTION SUBCUTANEOUS ONCE
Status: COMPLETED | OUTPATIENT
Start: 2021-09-15 | End: 2021-09-15

## 2021-09-15 RX ORDER — 0.9 % SODIUM CHLORIDE 0.9 %
500 INTRAVENOUS SOLUTION INTRAVENOUS
Status: DISCONTINUED | OUTPATIENT
Start: 2021-09-15 | End: 2021-09-15 | Stop reason: HOSPADM

## 2021-09-15 RX ORDER — PHENYLEPHRINE HCL IN 0.9% NACL 1 MG/10 ML
SYRINGE (ML) INTRAVENOUS PRN
Status: DISCONTINUED | OUTPATIENT
Start: 2021-09-15 | End: 2021-09-15 | Stop reason: SDUPTHER

## 2021-09-15 RX ORDER — FENTANYL CITRATE 50 UG/ML
25 INJECTION, SOLUTION INTRAMUSCULAR; INTRAVENOUS
Status: COMPLETED | OUTPATIENT
Start: 2021-09-15 | End: 2021-09-16

## 2021-09-15 RX ADMIN — OXYCODONE HYDROCHLORIDE 5 MG: 5 TABLET ORAL at 09:33

## 2021-09-15 RX ADMIN — POTASSIUM CHLORIDE 40 MEQ: 1500 TABLET, EXTENDED RELEASE ORAL at 09:34

## 2021-09-15 RX ADMIN — SODIUM CHLORIDE: 9 INJECTION, SOLUTION INTRAVENOUS at 12:28

## 2021-09-15 RX ADMIN — SODIUM CHLORIDE: 9 INJECTION, SOLUTION INTRAVENOUS at 08:49

## 2021-09-15 RX ADMIN — PIPERACILLIN AND TAZOBACTAM 3375 MG: 3; .375 INJECTION, POWDER, FOR SOLUTION INTRAVENOUS at 11:00

## 2021-09-15 RX ADMIN — Medication 100 MCG: at 12:48

## 2021-09-15 RX ADMIN — SODIUM CHLORIDE, PRESERVATIVE FREE 10 ML: 5 INJECTION INTRAVENOUS at 07:59

## 2021-09-15 RX ADMIN — PANTOPRAZOLE SODIUM 20 MG: 20 TABLET, DELAYED RELEASE ORAL at 17:05

## 2021-09-15 RX ADMIN — FENTANYL CITRATE 25 MCG: 50 INJECTION, SOLUTION INTRAMUSCULAR; INTRAVENOUS at 13:36

## 2021-09-15 RX ADMIN — INSULIN GLARGINE 20 UNITS: 100 INJECTION, SOLUTION SUBCUTANEOUS at 20:34

## 2021-09-15 RX ADMIN — ACETAMINOPHEN 650 MG: 325 TABLET ORAL at 17:08

## 2021-09-15 RX ADMIN — INSULIN LISPRO 8 UNITS: 100 INJECTION, SOLUTION INTRAVENOUS; SUBCUTANEOUS at 17:08

## 2021-09-15 RX ADMIN — PROPOFOL 200 MG: 10 INJECTION, EMULSION INTRAVENOUS at 12:30

## 2021-09-15 RX ADMIN — ACETAMINOPHEN 650 MG: 325 TABLET ORAL at 09:33

## 2021-09-15 RX ADMIN — HYDROMORPHONE HYDROCHLORIDE 0.5 MG: 1 INJECTION, SOLUTION INTRAMUSCULAR; INTRAVENOUS; SUBCUTANEOUS at 13:21

## 2021-09-15 RX ADMIN — OXYCODONE HYDROCHLORIDE 5 MG: 5 TABLET ORAL at 03:56

## 2021-09-15 RX ADMIN — PIPERACILLIN AND TAZOBACTAM 3375 MG: 3; .375 INJECTION, POWDER, FOR SOLUTION INTRAVENOUS at 03:56

## 2021-09-15 RX ADMIN — FENTANYL CITRATE 100 MCG: 50 INJECTION, SOLUTION INTRAMUSCULAR; INTRAVENOUS at 12:30

## 2021-09-15 RX ADMIN — POTASSIUM CHLORIDE 10 MEQ: 7.46 INJECTION, SOLUTION INTRAVENOUS at 07:55

## 2021-09-15 RX ADMIN — INSULIN LISPRO 2 UNITS: 100 INJECTION, SOLUTION INTRAVENOUS; SUBCUTANEOUS at 09:00

## 2021-09-15 RX ADMIN — ACETAMINOPHEN 650 MG: 325 TABLET ORAL at 21:41

## 2021-09-15 RX ADMIN — INSULIN GLARGINE 12 UNITS: 100 INJECTION, SOLUTION SUBCUTANEOUS at 06:08

## 2021-09-15 RX ADMIN — LIDOCAINE HYDROCHLORIDE 50 MG: 10 INJECTION, SOLUTION EPIDURAL; INFILTRATION; INTRACAUDAL; PERINEURAL at 12:30

## 2021-09-15 RX ADMIN — HYDROMORPHONE HYDROCHLORIDE 0.5 MG: 1 INJECTION, SOLUTION INTRAMUSCULAR; INTRAVENOUS; SUBCUTANEOUS at 13:26

## 2021-09-15 RX ADMIN — FENTANYL CITRATE 25 MCG: 50 INJECTION, SOLUTION INTRAMUSCULAR; INTRAVENOUS at 13:31

## 2021-09-15 RX ADMIN — FENTANYL CITRATE 25 MCG: 50 INJECTION, SOLUTION INTRAMUSCULAR; INTRAVENOUS at 13:12

## 2021-09-15 RX ADMIN — INSULIN LISPRO 5 UNITS: 100 INJECTION, SOLUTION INTRAVENOUS; SUBCUTANEOUS at 20:47

## 2021-09-15 RX ADMIN — PREGABALIN 75 MG: 75 CAPSULE ORAL at 20:34

## 2021-09-15 RX ADMIN — OXYCODONE HYDROCHLORIDE 5 MG: 5 TABLET ORAL at 21:39

## 2021-09-15 RX ADMIN — TRAZODONE HYDROCHLORIDE 150 MG: 100 TABLET ORAL at 20:34

## 2021-09-15 RX ADMIN — LIDOCAINE HYDROCHLORIDE 50 ML: 10 INJECTION, SOLUTION INFILTRATION; PERINEURAL at 12:30

## 2021-09-15 RX ADMIN — FENTANYL CITRATE 25 MCG: 50 INJECTION, SOLUTION INTRAMUSCULAR; INTRAVENOUS at 22:24

## 2021-09-15 RX ADMIN — OXYCODONE HYDROCHLORIDE 5 MG: 5 TABLET ORAL at 17:08

## 2021-09-15 RX ADMIN — BUDESONIDE AND FORMOTEROL FUMARATE DIHYDRATE 2 PUFF: 80; 4.5 AEROSOL RESPIRATORY (INHALATION) at 08:29

## 2021-09-15 RX ADMIN — PREGABALIN 75 MG: 75 CAPSULE ORAL at 07:59

## 2021-09-15 RX ADMIN — MIDAZOLAM HYDROCHLORIDE 2 MG: 1 INJECTION, SOLUTION INTRAMUSCULAR; INTRAVENOUS at 12:28

## 2021-09-15 RX ADMIN — PIPERACILLIN AND TAZOBACTAM 3375 MG: 3; .375 INJECTION, POWDER, FOR SOLUTION INTRAVENOUS at 00:06

## 2021-09-15 RX ADMIN — PIPERACILLIN AND TAZOBACTAM 3375 MG: 3; .375 INJECTION, POWDER, FOR SOLUTION INTRAVENOUS at 18:24

## 2021-09-15 RX ADMIN — Medication 100 MCG: at 12:37

## 2021-09-15 RX ADMIN — PANTOPRAZOLE SODIUM 20 MG: 20 TABLET, DELAYED RELEASE ORAL at 07:59

## 2021-09-15 RX ADMIN — FENTANYL CITRATE 25 MCG: 50 INJECTION, SOLUTION INTRAMUSCULAR; INTRAVENOUS at 13:17

## 2021-09-15 ASSESSMENT — PULMONARY FUNCTION TESTS
PIF_VALUE: 6
PIF_VALUE: 18
PIF_VALUE: 6
PIF_VALUE: 5
PIF_VALUE: 9
PIF_VALUE: 1
PIF_VALUE: 2
PIF_VALUE: 21
PIF_VALUE: 8
PIF_VALUE: 17
PIF_VALUE: 7
PIF_VALUE: 5
PIF_VALUE: 8
PIF_VALUE: 4
PIF_VALUE: 20
PIF_VALUE: 5
PIF_VALUE: 25
PIF_VALUE: 8
PIF_VALUE: 1
PIF_VALUE: 7
PIF_VALUE: 19
PIF_VALUE: 7
PIF_VALUE: 8
PIF_VALUE: 7
PIF_VALUE: 20
PIF_VALUE: 7
PIF_VALUE: 3
PIF_VALUE: 7
PIF_VALUE: 15
PIF_VALUE: 1
PIF_VALUE: 18
PIF_VALUE: 0
PIF_VALUE: 0
PIF_VALUE: 8
PIF_VALUE: 19
PIF_VALUE: 4
PIF_VALUE: 24
PIF_VALUE: 8
PIF_VALUE: 5

## 2021-09-15 ASSESSMENT — ENCOUNTER SYMPTOMS
BLOOD IN STOOL: 0
COUGH: 0
APNEA: 0
COLOR CHANGE: 1
SHORTNESS OF BREATH: 0
BACK PAIN: 0
CONSTIPATION: 0
SINUS PAIN: 0
NAUSEA: 0
ABDOMINAL DISTENTION: 0
VOMITING: 0
PHOTOPHOBIA: 0
WHEEZING: 0
EYE REDNESS: 0

## 2021-09-15 ASSESSMENT — PAIN SCALES - GENERAL
PAINLEVEL_OUTOF10: 3
PAINLEVEL_OUTOF10: 6
PAINLEVEL_OUTOF10: 4
PAINLEVEL_OUTOF10: 6
PAINLEVEL_OUTOF10: 7
PAINLEVEL_OUTOF10: 10
PAINLEVEL_OUTOF10: 8
PAINLEVEL_OUTOF10: 6
PAINLEVEL_OUTOF10: 10
PAINLEVEL_OUTOF10: 8
PAINLEVEL_OUTOF10: 8
PAINLEVEL_OUTOF10: 2
PAINLEVEL_OUTOF10: 6
PAINLEVEL_OUTOF10: 8
PAINLEVEL_OUTOF10: 6
PAINLEVEL_OUTOF10: 8

## 2021-09-15 ASSESSMENT — PAIN DESCRIPTION - PROGRESSION
CLINICAL_PROGRESSION: GRADUALLY WORSENING

## 2021-09-15 ASSESSMENT — PAIN DESCRIPTION - ORIENTATION
ORIENTATION: RIGHT
ORIENTATION: RIGHT

## 2021-09-15 ASSESSMENT — PAIN DESCRIPTION - LOCATION
LOCATION: HAND
LOCATION: HAND

## 2021-09-15 ASSESSMENT — PAIN DESCRIPTION - ONSET: ONSET: GRADUAL

## 2021-09-15 ASSESSMENT — PAIN DESCRIPTION - PAIN TYPE
TYPE: SURGICAL PAIN
TYPE: SURGICAL PAIN

## 2021-09-15 ASSESSMENT — PAIN - FUNCTIONAL ASSESSMENT: PAIN_FUNCTIONAL_ASSESSMENT: 0-10

## 2021-09-15 ASSESSMENT — PAIN DESCRIPTION - DESCRIPTORS: DESCRIPTORS: BURNING;SHARP

## 2021-09-15 ASSESSMENT — PAIN DESCRIPTION - FREQUENCY: FREQUENCY: INTERMITTENT

## 2021-09-15 NOTE — PROGRESS NOTES
Patient refusing bs checks. Internal med Senior on call notified. Awaiting BMP results. Last draw was hemolyzed.

## 2021-09-15 NOTE — BRIEF OP NOTE
Brief Postoperative Note      Patient: Ronney Epley  YOB: 1965  MRN: [id number]    Date of Procedure: 9/15/2021    Pre-Op Diagnosis: CELLULITIS    Post-Op Diagnosis:        Procedure(s):  I&D INDEX FINGER     Surgeon(s):  Geni Kingsley MD    Assistant:  * No surgical staff found *    Anesthesia: General    Estimated Blood Loss (mL): Minimal    Complications: None    Specimens:   * No specimens in log *    Implants:  * No implants in log *      Drains: * No LDAs found *    Findings:     Electronically signed by Geni Kingsley MD on 9/15/2021 at 1:04 PM

## 2021-09-15 NOTE — PROGRESS NOTES
[facility]  Internal Medicine Teaching Residency Program  Inpatient Daily Progress Note  ______________________________________________________________________________    Patient: Eros Frank  YOB: 1965   HBB:[id number]    Acct: [de-identified]     Room: 2023/2023-01  Admit date: 9/14/2021  Today's date: 09/15/21  Number of days in the hospital: 1    SUBJECTIVE   Admitting Diagnosis: DKA (diabetic ketoacidoses) ([facility]     CC: Right index finger pain    Pt examined at bedside. Chart & results reviewed. Patient remained afebrile and hemodynamically stable. She was agitated and refused BMP labs and blood sugar checks multiple times through the night. The patient was bridged this morning with Lantus 12 units. Insulin gtt stopped this morning. ID following: Continue Zosyn and Daptomycin for right index finger cellulitis/abscess  - Blood glucose: 146<--164<--172<--218  Labs reviewed: Na 134, K 3.7, bicarb 20, Cr 1.40, AG 16, CK 71, WBC 10.4<--9.4, Hgb 8.9<-10.7      ROS:  Constitutional:  negative for chills, fevers, sweats  Respiratory:  negative for cough, dyspnea on exertion, hemoptysis, shortness of breath, wheezing  Cardiovascular:  negative for chest pain, chest pressure/discomfort, lower extremity edema, palpitations  Gastrointestinal:  negative for abdominal pain, constipation, diarrhea, nausea, vomiting  Neurological:  negative for dizziness, headache    BRIEF HISTORY     The patient is a pleasant 49 y.o. female presents with a chief complaint of right index finger pain. The patient has PMH significant for DM-2, HTN, Bipolar disorder, and polysubstance abuse. The patient reported that she had a fall yesterday and injured her right index finger and since then has had swelling and pain. She denies hitting her head or loss of consciousness. She denies urine or fecal incontinence. She denies neck pain, chest pain, shortness of breath, abdominal pain.  The patient was tearful during the interview and was unable to provide a detailed history. She denies intention of self-harm or suicidal ideation. Plastic surgery was consulted and patient was taken to OR for exploration and drainage of right index finger abscess.      In the ED, the patient was afebrile Temp 98.5F, RR 18, HR 98, /123, SpO2 95%. Labs reviewed: Na 126, K 4.9, bicarb 16, BUN 11, Cr 1.08, Glucose 719, beta-hydroxy 4.42, WBC 9.4, Hgb 10.7, Plt 59, ESR 69, Blood gas: pH 7.283/ pCO2 53.7/ pO2 20.1/ HCO3 24.6Covid negative, wound culture shows gram negative rods. The patient received 10 units of insulin in the ED and was started on DKA protocol with insulin gtt and NS IVF. She was started on Vanc and Zosyn. UA, CXR, HbA1c ordered, will follow up. OBJECTIVE     Vital Signs:  /72   Pulse 103   Temp 99.2 °F (37.3 °C) (Oral)   Resp 16   Ht 5' 6\" (1.676 m)   Wt 238 lb 9.6 oz (108.2 kg)   LMP  (LMP Unknown)   SpO2 98%   BMI 38.51 kg/m²     Temp (24hrs), Av.4 °F (36.9 °C), Min:96.8 °F (36 °C), Max:100.2 °F (37.9 °C)    In: 1305   Out: -     Physical Exam:  Constitutional: This is a well developed, well nourished, 35-39.9 - Obesity Grade II 49y.o. year old female who is alert, oriented, cooperative and in no apparent distress. Head:normocephalic and atraumatic. EENT:  PERRLA. No conjunctival injections. Septum was midline, mucosa was without erythema, exudates or cobblestoning. No thrush was noted. Neck: Supple without thyromegaly. No elevated JVP. Trachea was midline. Respiratory: Chest was symmetrical without dullness to percussion. Breath sounds bilaterally were clear to auscultation. There were no wheezes, rhonchi or rales. There is no intercostal retraction or use of accessory muscles. No egophony noted. Cardiovascular: Regular without murmur, clicks, gallops or rubs. Abdomen: Slightly rounded and soft without organomegaly.  No rebound, rigidity or guarding was appreciated. Lymphatic: No lymphadenopathy. Musculoskeletal: Normal curvature of the spine. No gross muscle weakness. Extremities:  No lower extremity edema, ulcerations, tenderness, varicosities or erythema. Muscle size, tone and strength are normal.  No involuntary movements are noted. Skin:  Warm and dry. Good color, turgor and pigmentation. No lesions or scars.   No cyanosis or clubbing  Neurological/Psychiatric: The patient's general behavior, level of consciousness, thought content and emotional status is normal.        Medications:  Scheduled Medications:    sodium chloride  1,000 mL IntraVENous Once    budesonide-formoterol  2 puff Inhalation BID    pantoprazole  20 mg Oral BID AC    traZODone  150 mg Oral Nightly    pregabalin  75 mg Oral BID    venlafaxine  150 mg Oral Daily with breakfast    sodium chloride flush  5-40 mL IntraVENous 2 times per day    piperacillin-tazobactam  3,375 mg IntraVENous Q8H    daptomycin (CUBICIN) IVPB  4 mg/kg (Ideal) IntraVENous Q24H     Continuous Infusions:    dextrose      sodium chloride      sodium chloride Stopped (09/14/21 1427)    insulin 1.72 Units/hr (09/15/21 0444)     PRN Medicationsglucose, 15 g, PRN  dextrose, 12.5 g, PRN  glucagon (rDNA), 1 mg, PRN  dextrose, 100 mL/hr, PRN  albuterol sulfate HFA, 2 puff, Q4H PRN  sodium chloride flush, 5-40 mL, PRN  sodium chloride, 25 mL, PRN  ondansetron, 4 mg, Q8H PRN   Or  ondansetron, 4 mg, Q6H PRN  polyethylene glycol, 17 g, Daily PRN  acetaminophen, 650 mg, Q6H PRN   Or  acetaminophen, 650 mg, Q6H PRN  dextrose, 12.5 g, PRN  potassium chloride, 10 mEq, PRN  magnesium sulfate, 1,000 mg, PRN  sodium phosphate IVPB, 10 mmol, PRN   Or  sodium phosphate IVPB, 15 mmol, PRN   Or  sodium phosphate IVPB, 20 mmol, PRN  oxyCODONE, 5 mg, Q4H PRN        Diagnostic Labs:  CBC:   Recent Labs     09/14/21  0703 09/15/21  0648   WBC 9.4 10.4   RBC 3.40* 2.79*   HGB 10.7* 8.9*   HCT 36.2* 28.2*   .5* 101.1 RDW 13.2 13.2   PLT 59* 329     BMP:   Recent Labs     09/14/21  1511 09/14/21  1814 09/14/21  2353   * 133* Unable to perform testing: Specimen contaminated. K 3.8 3.8 PLEASE DISREGARD RESULTS. SPECIMEN CONTAMINATED. CL 95* 98 PLEASE DISREGARD RESULTS. SPECIMEN CONTAMINATED. CO2 21 20 Unable to perform testing: Specimen contaminated. PHOS 3.8 3.0 PLEASE DISREGARD RESULTS. SPECIMEN CONTAMINATED. BUN 11 11 PLEASE DISREGARD RESULTS. SPECIMEN CONTAMINATED. CREATININE 0.99* 1.09* PLEASE DISREGARD RESULTS. SPECIMEN CONTAMINATED.     BNP: No results for input(s): BNP in the last 72 hours. PT/INR: No results for input(s): PROTIME, INR in the last 72 hours. APTT: No results for input(s): APTT in the last 72 hours. CARDIAC ENZYMES: No results for input(s): CKMB, CKMBINDEX, TROPONINI in the last 72 hours. Invalid input(s): CKTOTAL;3  FASTING LIPID PANEL:  Lab Results   Component Value Date    CHOL 275 (H) 12/30/2020    HDL 80 12/30/2020    TRIG 246 (H) 12/30/2020     LIVER PROFILE:   Recent Labs     09/14/21  1511   AST 10   ALT 7   BILIDIR 0.15   BILITOT 0.37   ALKPHOS 161*      MICROBIOLOGY:   Lab Results   Component Value Date/Time    CULTURE NO GROWTH 6 HOURS 09/14/2021 03:06 PM       Imaging:    XR HAND RIGHT (MIN 3 VIEWS)    Result Date: 9/14/2021  Marked soft tissue swelling involving the 2nd finger and dorsum of the hand. No acute osseous abnormality evident. ASSESSMENT & PLAN     ASSESSMENT / PLAN:     Tenosynovitis Right index finger  - s/p drainage of abscess per plastic   - Plastic surgery following- wound care   - Received 1 dose of Vancomycin and TDAP vaccine shot  - ID following: Continue on Zosyn and Daptomycin; Discontiued vancomycin, started Daptomycin.   - CPK 71        DKA  - Glucose 719, beta-hydroxy 4.42  - blood gas: pH 7.283/ pCO2 53.7/ pO2 20.1/ HCO3 24.6  - Patient received 10 units insulin and started on DKA protocol with insulin gtt and IVF in the ED  - Continue to monitor blood glucose q1 hour  - Continue to monitor BMP labs q4 hours  - On IVF NA @ 250 mL/h + Insulin gtt  - Patient is on home Lantus 20 units BID  - HbA1c ordered, will follow up; last HbA1c 12.1  - Blood glucose: 146<--164<--172<--218  - The patient was bridged this morning with Lantus 12 units. Insulin gtt stopped this morning.   - Adult diet- advance as tolerated          Polysubstance abuse  - Urine tox: positive for benzodiazepine, cocaine and opiates  - Avoid beta blockers  - Monitor for withdrawal symptoms  - Monitor BP closely  - erratic behavior overnight, patient wanted to leave AMA, refused lab draws       HTN  - uncontrolled intermittently   - possibly secondary to polysubstance abuse  - Monitor BP; avoid beta-blockers       Thrombocytopenia- Plt 329- resolved  - secondary to infection vs. Drug abuse  - PEripehral smear ordered, will follow up  - Continue to monitor  - Hold heparin sq for DVT PPX; Hold Lovenox due to MARTIN       MARTIN- Cr 1.08 (0.8~0.9) -->1.40  - Continue IVF  - Continue to monitor        DVT ppx: EPC cuffs  GI ppx: Protonix     PT/OT: On board  Discharge Planning:  consulted, will follow up       Adela Raya MD  Internal Medicine Resident, PGY-1  [facility];  [facility], [facility]  9/15/2021, 7:37 AM

## 2021-09-15 NOTE — PROGRESS NOTES
Plastics - Janusz Evangelista    I discussed surgical findings with patient. There was much purulence and necrotic fat which I debrided. I am hoping to salvage the finger but it is questionable. I could not close the wound. I plan to go back tomorrow at noon to re-explore and check tissue viability. Hopefully will be able to close tomorrow. She voiced understanding.

## 2021-09-15 NOTE — PLAN OF CARE
Problem: Skin Integrity:  Goal: Will show no infection signs and symptoms  Description: Will show no infection signs and symptoms  9/14/2021 2330 by Lyudmila Rose RN  Outcome: Ongoing  9/14/2021 1746 by Tish Jones RN  Outcome: Ongoing  Goal: Absence of new skin breakdown  Description: Absence of new skin breakdown  9/14/2021 2330 by Lyudmila Rose RN  Outcome: Ongoing  9/14/2021 1746 by Tish Jones RN  Outcome: Ongoing     Problem: Falls - Risk of:  Goal: Will remain free from falls  Description: Will remain free from falls  9/14/2021 2330 by Lyudmila Rose RN  Outcome: Ongoing  9/14/2021 1746 by Tish Jones RN  Outcome: Ongoing  Goal: Absence of physical injury  Description: Absence of physical injury  9/14/2021 2330 by Lyudmila Rose RN  Outcome: Ongoing  9/14/2021 1746 by Tish Jones RN  Outcome: Ongoing

## 2021-09-15 NOTE — OP NOTE
[address] [address]                                OPERATIVE REPORT    PATIENT NAME: Matilde Ann                   :        1965  MED REC NO:   [id number]                             ROOM:         ACCOUNT NO:   [de-identified]                           ADMIT DATE: 2021  PROVIDER:     Jareth Knutson    DATE OF PROCEDURE:  2021    ATTENDING PROVIDER:  Ailyn Villaseñor DO    SURGEON:  Jareth Knutson MD    PREOPERATIVE DIAGNOSIS:  Cellulitis, right index finger. POSTOPERATIVE DIAGNOSIS:  Cellulitis, right index finger with abscess. PROCEDURE:  I and D of right index finger. INDICATIONS:  The patient is a 53-year-old female who presents with she  states a 24-day course of worsening pain and swelling in the right index  finger. She was seen in the emergency department and a superficial  pocket was drained under the callus. This was cultured, but it was  clear that there was something deeper going on. She was therefore  scheduled for surgery and brought to the operating room at this time for  incision and draining of the finger. The procedure, the risks, the  benefits were discussed with her. All questions answered to her  satisfaction. Consent was signed. NARRATIVE SUMMARY:  The patient was brought to the operating suite,  placed under sedation in the supine position. Local anesthetic was  infiltrated. She was kept sedated per Anesthesia. Initially, Brett  zigzag incisions were carried out over the volar index finger and  extended as necessary through the procedure out to the pad and into the  distal hand coming up to the distal palmar crease. Found was cloudy  fluid and a lot of necrotic fat which was sharply debrided away. The  tendon sheath looked pink and the tendon itself, exposed very distally,   was intact.   The neurovascular bundle on the radial side distal   to the PIP joint was somewhat grayish, of questionable viability,   it was not debrided away. After all clearly necrotic tissue was debrided away, it was felt that   the wound was not suitable to be closed at this point, will therefore   be kept in a moist dressing and re-explored in 24 hours to check   viability of the tissues prior to closure. Wound was then dressed   with Maribeth and Kerlix roll, moistened with normal saline. The patient   tolerated the procedure well. Blood loss minimal.  Specimens, cultures, complications none. The  patient transferred to recovery in stable condition.         Mildred Valentino    D: 09/15/2021 9:49:32       T: 09/15/2021 9:52:57     [id number]  Job#: [id number]     Doc#: [id number]    CC:
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
## 2021-09-15 NOTE — PROGRESS NOTES
Occupational [address]  Occupational Therapy Not Seen Note    DATE: 9/15/2021    NAME: Kay Cranker  MRN: [id number]   : 1965      Patient not seen this date for Occupational Therapy due to:    Surgery/Procedure: pt off the floor in OR for I&D INDEX FINGER    Next Scheduled Treatment:     Electronically signed by [place] OT/S on 9/15/2021 at 12:14 PM

## 2021-09-16 LAB
ABSOLUTE EOS #: 0.16 K/UL (ref 0–0.44)
ABSOLUTE IMMATURE GRANULOCYTE: 0.04 K/UL (ref 0–0.3)
ABSOLUTE LYMPH #: 1.5 K/UL (ref 1.1–3.7)
ABSOLUTE MONO #: 0.89 K/UL (ref 0.1–1.2)
ANION GAP SERPL CALCULATED.3IONS-SCNC: 9 MMOL/L (ref 9–17)
BASOPHILS # BLD: 0 % (ref 0–2)
BASOPHILS ABSOLUTE: 0.03 K/UL (ref 0–0.2)
BUN BLDV-MCNC: 16 MG/DL (ref 6–20)
BUN/CREAT BLD: ABNORMAL (ref 9–20)
CALCIUM SERPL-MCNC: 7.8 MG/DL (ref 8.6–10.4)
CHLORIDE BLD-SCNC: 102 MMOL/L (ref 98–107)
CO2: 21 MMOL/L (ref 20–31)
CREAT SERPL-MCNC: 1.59 MG/DL (ref 0.5–0.9)
DIFFERENTIAL TYPE: ABNORMAL
EOSINOPHILS RELATIVE PERCENT: 2 % (ref 1–4)
ESTIMATED AVERAGE GLUCOSE: 361 MG/DL
FOLATE: 9.6 NG/ML
GFR AFRICAN AMERICAN: 41 ML/MIN
GFR NON-AFRICAN AMERICAN: 34 ML/MIN
GFR SERPL CREATININE-BSD FRML MDRD: ABNORMAL ML/MIN/{1.73_M2}
GFR SERPL CREATININE-BSD FRML MDRD: ABNORMAL ML/MIN/{1.73_M2}
GLUCOSE BLD-MCNC: 313 MG/DL (ref 65–105)
GLUCOSE BLD-MCNC: 337 MG/DL (ref 70–99)
GLUCOSE BLD-MCNC: 374 MG/DL (ref 65–105)
HBA1C MFR BLD: 14.2 % (ref 4–6)
HCT VFR BLD CALC: 26.3 % (ref 36.3–47.1)
HEMOGLOBIN: 8 G/DL (ref 11.9–15.1)
IMMATURE GRANULOCYTES: 1 %
LYMPHOCYTES # BLD: 19 % (ref 24–43)
MCH RBC QN AUTO: 31 PG (ref 25.2–33.5)
MCHC RBC AUTO-ENTMCNC: 30.4 G/DL (ref 28.4–34.8)
MCV RBC AUTO: 101.9 FL (ref 82.6–102.9)
MONOCYTES # BLD: 11 % (ref 3–12)
NRBC AUTOMATED: 0 PER 100 WBC
PATHOLOGIST REVIEW: NORMAL
PDW BLD-RTO: 13.2 % (ref 11.8–14.4)
PLATELET # BLD: 323 K/UL (ref 138–453)
PLATELET ESTIMATE: ABNORMAL
PMV BLD AUTO: 11.1 FL (ref 8.1–13.5)
POTASSIUM SERPL-SCNC: 4.1 MMOL/L (ref 3.7–5.3)
RBC # BLD: 2.58 M/UL (ref 3.95–5.11)
RBC # BLD: ABNORMAL 10*6/UL
SEG NEUTROPHILS: 67 % (ref 36–65)
SEGMENTED NEUTROPHILS ABSOLUTE COUNT: 5.39 K/UL (ref 1.5–8.1)
SODIUM BLD-SCNC: 132 MMOL/L (ref 135–144)
TSH SERPL DL<=0.05 MIU/L-ACNC: 0.82 MIU/L (ref 0.3–5)
VITAMIN B-12: 531 PG/ML (ref 232–1245)
WBC # BLD: 8 K/UL (ref 3.5–11.3)
WBC # BLD: ABNORMAL 10*3/UL

## 2021-09-16 PROCEDURE — 6360000002 HC RX W HCPCS: Performed by: PLASTIC SURGERY

## 2021-09-16 PROCEDURE — 6370000000 HC RX 637 (ALT 250 FOR IP): Performed by: STUDENT IN AN ORGANIZED HEALTH CARE EDUCATION/TRAINING PROGRAM

## 2021-09-16 PROCEDURE — 82746 ASSAY OF FOLIC ACID SERUM: CPT

## 2021-09-16 PROCEDURE — 6370000000 HC RX 637 (ALT 250 FOR IP): Performed by: PLASTIC SURGERY

## 2021-09-16 PROCEDURE — 36415 COLL VENOUS BLD VENIPUNCTURE: CPT

## 2021-09-16 PROCEDURE — 80048 BASIC METABOLIC PNL TOTAL CA: CPT

## 2021-09-16 PROCEDURE — 82947 ASSAY GLUCOSE BLOOD QUANT: CPT

## 2021-09-16 PROCEDURE — 2060000000 HC ICU INTERMEDIATE R&B

## 2021-09-16 PROCEDURE — 2580000003 HC RX 258: Performed by: PLASTIC SURGERY

## 2021-09-16 PROCEDURE — 82607 VITAMIN B-12: CPT

## 2021-09-16 PROCEDURE — 1200000000 HC SEMI PRIVATE

## 2021-09-16 PROCEDURE — 94640 AIRWAY INHALATION TREATMENT: CPT

## 2021-09-16 PROCEDURE — 84443 ASSAY THYROID STIM HORMONE: CPT

## 2021-09-16 PROCEDURE — 99232 SBSQ HOSP IP/OBS MODERATE 35: CPT | Performed by: INTERNAL MEDICINE

## 2021-09-16 PROCEDURE — 83036 HEMOGLOBIN GLYCOSYLATED A1C: CPT

## 2021-09-16 PROCEDURE — 85025 COMPLETE CBC W/AUTO DIFF WBC: CPT

## 2021-09-16 PROCEDURE — 6370000000 HC RX 637 (ALT 250 FOR IP): Performed by: INTERNAL MEDICINE

## 2021-09-16 PROCEDURE — 6360000002 HC RX W HCPCS: Performed by: STUDENT IN AN ORGANIZED HEALTH CARE EDUCATION/TRAINING PROGRAM

## 2021-09-16 RX ORDER — FLUCONAZOLE 200 MG/1
200 TABLET ORAL DAILY
Status: DISCONTINUED | OUTPATIENT
Start: 2021-09-16 | End: 2021-09-17

## 2021-09-16 RX ORDER — HEPARIN SODIUM 5000 [USP'U]/ML
5000 INJECTION, SOLUTION INTRAVENOUS; SUBCUTANEOUS EVERY 8 HOURS SCHEDULED
Status: DISCONTINUED | OUTPATIENT
Start: 2021-09-16 | End: 2021-09-19

## 2021-09-16 RX ADMIN — INSULIN LISPRO 8 UNITS: 100 INJECTION, SOLUTION INTRAVENOUS; SUBCUTANEOUS at 08:00

## 2021-09-16 RX ADMIN — PIPERACILLIN AND TAZOBACTAM 3375 MG: 3; .375 INJECTION, POWDER, FOR SOLUTION INTRAVENOUS at 02:55

## 2021-09-16 RX ADMIN — TRAZODONE HYDROCHLORIDE 150 MG: 100 TABLET ORAL at 20:33

## 2021-09-16 RX ADMIN — OXYCODONE HYDROCHLORIDE 5 MG: 5 TABLET ORAL at 09:30

## 2021-09-16 RX ADMIN — INSULIN LISPRO 5 UNITS: 100 INJECTION, SOLUTION INTRAVENOUS; SUBCUTANEOUS at 20:34

## 2021-09-16 RX ADMIN — PREGABALIN 75 MG: 75 CAPSULE ORAL at 09:45

## 2021-09-16 RX ADMIN — BUDESONIDE AND FORMOTEROL FUMARATE DIHYDRATE 2 PUFF: 80; 4.5 AEROSOL RESPIRATORY (INHALATION) at 09:30

## 2021-09-16 RX ADMIN — FLUCONAZOLE 200 MG: 200 TABLET ORAL at 16:57

## 2021-09-16 RX ADMIN — INSULIN GLARGINE 20 UNITS: 100 INJECTION, SOLUTION SUBCUTANEOUS at 09:46

## 2021-09-16 RX ADMIN — OXYCODONE HYDROCHLORIDE 5 MG: 5 TABLET ORAL at 20:33

## 2021-09-16 RX ADMIN — INSULIN GLARGINE 20 UNITS: 100 INJECTION, SOLUTION SUBCUTANEOUS at 20:33

## 2021-09-16 RX ADMIN — ACETAMINOPHEN 650 MG: 325 TABLET ORAL at 04:59

## 2021-09-16 RX ADMIN — FENTANYL CITRATE 25 MCG: 50 INJECTION, SOLUTION INTRAMUSCULAR; INTRAVENOUS at 01:22

## 2021-09-16 RX ADMIN — INSULIN LISPRO 8 UNITS: 100 INJECTION, SOLUTION INTRAVENOUS; SUBCUTANEOUS at 13:00

## 2021-09-16 RX ADMIN — ACETAMINOPHEN 650 MG: 325 TABLET ORAL at 13:08

## 2021-09-16 RX ADMIN — VENLAFAXINE HYDROCHLORIDE 150 MG: 150 CAPSULE, EXTENDED RELEASE ORAL at 09:46

## 2021-09-16 RX ADMIN — PREGABALIN 75 MG: 75 CAPSULE ORAL at 20:33

## 2021-09-16 RX ADMIN — DAPTOMYCIN 235 MG: 500 INJECTION, POWDER, LYOPHILIZED, FOR SOLUTION INTRAVENOUS at 16:58

## 2021-09-16 RX ADMIN — OXYCODONE HYDROCHLORIDE 5 MG: 5 TABLET ORAL at 16:16

## 2021-09-16 RX ADMIN — PANTOPRAZOLE SODIUM 20 MG: 20 TABLET, DELAYED RELEASE ORAL at 16:50

## 2021-09-16 RX ADMIN — PANTOPRAZOLE SODIUM 20 MG: 20 TABLET, DELAYED RELEASE ORAL at 07:00

## 2021-09-16 RX ADMIN — PIPERACILLIN AND TAZOBACTAM 3375 MG: 3; .375 INJECTION, POWDER, FOR SOLUTION INTRAVENOUS at 20:33

## 2021-09-16 RX ADMIN — PIPERACILLIN AND TAZOBACTAM 3375 MG: 3; .375 INJECTION, POWDER, FOR SOLUTION INTRAVENOUS at 13:09

## 2021-09-16 RX ADMIN — OXYCODONE HYDROCHLORIDE 5 MG: 5 TABLET ORAL at 04:59

## 2021-09-16 ASSESSMENT — PAIN DESCRIPTION - LOCATION
LOCATION: HAND

## 2021-09-16 ASSESSMENT — PAIN DESCRIPTION - PROGRESSION

## 2021-09-16 ASSESSMENT — PAIN SCALES - GENERAL
PAINLEVEL_OUTOF10: 10
PAINLEVEL_OUTOF10: 0
PAINLEVEL_OUTOF10: 7
PAINLEVEL_OUTOF10: 10
PAINLEVEL_OUTOF10: 9
PAINLEVEL_OUTOF10: 9
PAINLEVEL_OUTOF10: 2
PAINLEVEL_OUTOF10: 0
PAINLEVEL_OUTOF10: 8
PAINLEVEL_OUTOF10: 3
PAINLEVEL_OUTOF10: 6
PAINLEVEL_OUTOF10: 7

## 2021-09-16 ASSESSMENT — PAIN DESCRIPTION - PAIN TYPE
TYPE: ACUTE PAIN

## 2021-09-16 ASSESSMENT — ENCOUNTER SYMPTOMS
SHORTNESS OF BREATH: 0
COUGH: 0
ABDOMINAL DISTENTION: 0
COLOR CHANGE: 1
APNEA: 0
PHOTOPHOBIA: 0
EYE REDNESS: 0

## 2021-09-16 ASSESSMENT — PAIN DESCRIPTION - FREQUENCY
FREQUENCY: CONTINUOUS

## 2021-09-16 ASSESSMENT — PAIN DESCRIPTION - ORIENTATION
ORIENTATION: RIGHT
ORIENTATION: RIGHT

## 2021-09-16 ASSESSMENT — PAIN DESCRIPTION - DESCRIPTORS
DESCRIPTORS: CRUSHING;DISCOMFORT;HEAVINESS
DESCRIPTORS: DISCOMFORT

## 2021-09-16 ASSESSMENT — PAIN DESCRIPTION - ONSET
ONSET: ON-GOING

## 2021-09-16 NOTE — PROGRESS NOTES
[facility] -   Infectious diseases evaluation  admission date 9/14/2021    reason for consultation:   Hand infection    Impression :   Current:  · DKA  · DM on insulin  · SLE  · Depressed  · Right 2nd finger infected soft tissues  ·  and open ulcer w drain age x 2 weeks pTA  · Right hand cellultiis    Other:  ·   Discussion / summary of stay / plan of care   ·   Recommendations   · Avoid antibiotics that would affect the kidneys and the antibiotic was DKA  · Continue Zosyn,   · Stop daptomycin,   · Add fluconazole 9/16  · Adjust antibiotics per final culture of the pus      Infection Control Recommendations   · [place] Precautions  · Contact Isolation       Antimicrobial Stewardship Recommendations   · Simplification of therapy  · Targeted therapy  · Per Kg dosing      Coordination ofOutpatient Care:   · Estimated Length of IV antimicrobials:  · Patient will need Midline / picc Catheter Insertion:   · Patient will need SNF:  · Patient will need outpatient wound care:     History of Present Illness:   Initial history:  Madhuri Kraft is a 49y.o.-year-old female   Presented with right second finger pain and progressive discoloration found to have an abscess over the right second finger associated with some redness over the palm in the same hand, increased pain, patient presented with DKA, area was lanced and packed, patient seen for evaluation of antibiotics  She is emotional, depressed, lost her son her daughter and her  to the short period of time.                 Interval changes  9/16/2021   Patient Vitals for the past 8 hrs:   Temp Temp src SpO2   09/16/21 1114 98.8 °F (37.1 °C) Oral 94 %     Calm and pain still present - no fever - redness hand better  No fever  Purulence cx yeast pend and OR cx pend neg    Chest x-ray negative, WBC 8  Drug screen positive for cocaine and benzodiazepine  Creatinine 1.09      Summary of relevant labs:  Labs:  WBC 7  Creat normal    Micro:  BC  Wound cx GPR  Imaging:  R hand xray 9/14/21  Marked soft tissue swelling involving the 2nd finger and dorsum of the hand. No acute osseous abnormality evident       I have personally reviewed the past medical history, past surgical history, medications, social history, and family history, and I haveupdated the database accordingly. Allergies:   Bactrim [sulfamethoxazole-trimethoprim] and Adhesive tape     Review of Systems:     Review of Systems   Constitutional: Negative for activity change and fatigue. HENT: Negative for congestion. Eyes: Negative for photophobia and redness. Respiratory: Negative for apnea, cough and shortness of breath. Cardiovascular: Negative for chest pain. Gastrointestinal: Negative for abdominal distention. Endocrine: Negative for polyphagia and polyuria. Genitourinary: Negative for dysuria, flank pain and urgency. Musculoskeletal: Positive for arthralgias. Skin: Positive for color change and wound. Allergic/Immunologic: Negative for immunocompromised state. Neurological: Negative for dizziness and seizures. Hematological: Negative for adenopathy. Psychiatric/Behavioral: Negative for agitation. Physical Examination :       Physical Exam  Constitutional:       Appearance: Normal appearance. She is not ill-appearing, toxic-appearing or diaphoretic. HENT:      Head: Normocephalic and atraumatic. Nose: Nose normal.      Mouth/Throat:      Mouth: Mucous membranes are moist.   Eyes:      General: No scleral icterus. Conjunctiva/sclera: Conjunctivae normal.      Pupils: Pupils are equal, round, and reactive to light. Cardiovascular:      Rate and Rhythm: Normal rate and regular rhythm. Heart sounds: Normal heart sounds. No murmur heard. Pulmonary:      Effort: No respiratory distress. Breath sounds: Normal breath sounds. Abdominal:      General: There is no distension. Palpations: Abdomen is soft. Tenderness: There is no abdominal tenderness. Genitourinary:     Comments: No cullen  Musculoskeletal:         General: No swelling. Cervical back: Neck supple. No rigidity or tenderness. Skin:     General: Skin is dry. Coloration: Skin is not jaundiced. Findings: Erythema and lesion present. Neurological:      Mental Status: She is alert and oriented to person, place, and time. Cranial Nerves: No cranial nerve deficit. Psychiatric:         Mood and Affect: Mood normal.         Thought Content:  Thought content normal.         Past Medical History:     Past Medical History:   Diagnosis Date    Acid reflux 5/29/2017    Acute cystitis with hematuria 10/11/2016    Acute non-recurrent maxillary sinusitis 11/28/2017    Asthma     Bipolar 1 disorder ([address] 1/4/2018    Bipolar disorder, mixed ([address]     BMI 34.0-34.9,adult 11/28/2017    Cannabis use disorder, severe, dependence ([address] 12/19/2017    Cerebrovascular accident (CVA) ([address] 6/14/2017    Chest pain 11/5/2016    Chronic renal insufficiency     Cocaine abuse ([address] 1/5/2018    COVID-19 virus RNA test result unknown     DDD (degenerative disc disease), cervical     Diabetes mellitus ([address]     Dizziness 6/13/2017    Fibromyalgia     History of stroke 9/9/2017    Homicidal ideation 11/6/2017    Hyperglycemia     Hypertension     Hypotension 1/18/2019    IDDM (insulin dependent diabetes mellitus) 12/21/2015    Lupus ([address]     Migraine     Neuropathy     Neuropathy     Polysubstance abuse ([address] 9/17/2017    Post traumatic stress disorder (PTSD)     Posttraumatic stress disorder 5/29/2017    Recurrent depression ([address] 5/29/2017    Recurrent major depressive disorder, in partial remission ([address] 11/28/2017    Screening mammogram, encounter for 11/28/2017    Severe recurrent major depression with psychotic features ([address] 12/19/2017    Severe recurrent major depression without psychotic features ([address] 12/19/2017    Stroke (cerebrum) ([facility] 6/14/2017    Stroke ([facility]     per chart notes    Suicidal ideation     Suicidal intent 3/10/2017    Vitamin D deficiency 11/28/2017    White matter changes 6/13/2017       Past Surgical  History:     Past Surgical History:   Procedure Laterality Date    ABDOMEN SURGERY      drain tube    ABSCESS DRAINAGE      right buttock    CATARACT REMOVAL WITH IMPLANT Bilateral     CHEST TUBE INSERTION      FINGER AMPUTATION Left 03/13/2021    LEFT RING FINER AMPUTATION performed by Guilford Fleischer, MD at [address] Right 09/15/2021     I&D INDEX FINGER     HAND SURGERY Right 09/14/2021    I&D INDEX FINGER performed by Guilford Fleischer, MD at [address] Right 9/15/2021    I&D INDEX FINGER performed by Guilford Fleischer, MD at [address] [facility] Bilateral        Medications:      fluconazole  200 mg Oral Daily    insulin lispro  0-12 Units SubCUTAneous TID WC    insulin lispro  0-6 Units SubCUTAneous Nightly    insulin glargine  20 Units SubCUTAneous BID    sodium chloride  1,000 mL IntraVENous Once    budesonide-formoterol  2 puff Inhalation BID    pantoprazole  20 mg Oral BID AC    traZODone  150 mg Oral Nightly    pregabalin  75 mg Oral BID    venlafaxine  150 mg Oral Daily with breakfast    sodium chloride flush  5-40 mL IntraVENous 2 times per day    piperacillin-tazobactam  3,375 mg IntraVENous Q8H    daptomycin (CUBICIN) IVPB  4 mg/kg (Ideal) IntraVENous Q24H       Social History:     Social History     Socioeconomic History    Marital status: Legally      Spouse name: Not on file    Number of children: Not on file    Years of education: Not on file    Highest education level: Not on file   Occupational History    Not on file   Tobacco Use    Smoking status: Never Smoker    Smokeless tobacco: Never Used   Vaping Use    Vaping Use: Never used   Substance and Sexual Activity    Alcohol use: Not Currently    Drug use:  Yes CONTAMINATED. < > 3.7 4.1   CL PLEASE DISREGARD RESULTS. SPECIMEN CONTAMINATED. < > 98 102   CO2 Unable to perform testing: Specimen contaminated. < > 20 21   BUN PLEASE DISREGARD RESULTS. SPECIMEN CONTAMINATED. < > 11 16   CREATININE PLEASE DISREGARD RESULTS. SPECIMEN CONTAMINATED. < > 1.40* 1.59*   MG PLEASE DISREGARD RESULTS. SPECIMEN CONTAMINATED.  --  1.8  --     < > = values in this interval not displayed. Hepatic Function Panel:   Recent Labs     09/14/21  1511   PROT 7.3   LABALBU 3.7   BILIDIR 0.15   IBILI 0.22   BILITOT 0.37   ALKPHOS 161*   ALT 7   AST 10     No results for input(s): RPR in the last 72 hours. No results for input(s): HIV in the last 72 hours. No results for input(s): BC in the last 72 hours. Lab Results   Component Value Date    CREATININE 1.59 09/16/2021    GLUCOSE 337 09/16/2021       Detailed results: Thank you for allowing us to participate in the care of this patient. Please call with questions. This note is created with the assistance of a speech recognition program.  While intending to generate adocument that actually reflects the content of the visit, the document can still have some errors including those of syntax and sound a like substitutions which may escape proof reading. It such instances, actual meaningcan be extrapolated by contextual diversion.     Claire Mcmullen MD  Office: [phone]  Perfect serve / office [phone]

## 2021-09-16 NOTE — PLAN OF CARE
Patient alert and oriented with capacity. Refused psych consult. Nisa Catesr to inform of patient  pain level 10 pain on right hand. As per pt pain is relieved by IV medication. Nisa Catesr via perfect serv  to reinstate medication.

## 2021-09-16 NOTE — PROGRESS NOTES
[facility] -   Infectious diseases evaluation  admission date 9/14/2021    reason for consultation:   Hand infection    Impression :   Current:  · DKA  · DM on insulin  · SLE  · Depressed  · Right 2nd finger infected soft tissues  ·  and open ulcer w drain age x 2 weeks pTA  · Right hand cellultiis    Other:  ·   Discussion / summary of stay / plan of care   ·   Recommendations   · Avoid antibiotics that would affect the kidneys and the antibiotic was DKA  · Continue Zosyn, stop vancomycin  · Start daptomycin, get a CPK  · Adjust antibiotics per final culture of the pus  · So far cultures gram-positive rods  · Blood culture pending    Infection Control Recommendations   · [place] Precautions  · Contact Isolation       Antimicrobial Stewardship Recommendations   · Simplification of therapy  · Targeted therapy  · Per Kg dosing      Coordination ofOutpatient Care:   · Estimated Length of IV antimicrobials:  · Patient will need Midline / picc Catheter Insertion:   · Patient will need SNF:  · Patient will need outpatient wound care:     History of Present Illness:   Initial history:  Danyell Caraballo is a 49y.o.-year-old female   Presented with right second finger pain and progressive discoloration found to have an abscess over the right second finger associated with some redness over the palm in the same hand, increased pain, patient presented with DKA, area was lanced and packed, patient seen for evaluation of antibiotics  She is emotional, depressed, lost her son her daughter and her  to the short period of time.                 Interval changes  9/15/2021   Patient Vitals for the past 8 hrs:   BP Temp Temp src Pulse Resp SpO2   09/15/21 2000 (!) 105/49 99 °F (37.2 °C) Axillary -- -- 96 %   09/15/21 1654 97/76 98.1 °F (36.7 °C) Oral 91 18 98 %   09/15/21 1438 95/65 98 °F (36.7 °C) Oral 91 12 98 %   09/15/21 1400 100/80 98 °F (36.7 °C) Temporal 91 14 99 %   Looks less ill today, she has no abdominal pain or shortness of breath right hand pain continues, all cultures from the right hand are showing so far gram-positive rods    Chest x-ray negative, WBC 10  Drug screen positive for cocaine and benzodiazepine  Creatinine 1.09      Summary of relevant labs:  Labs:  WBC 7  Creat normal    Micro:  BC  Wound cx GPR  Imaging:  R hand xray 9/14/21  Marked soft tissue swelling involving the 2nd finger and dorsum of the hand. No acute osseous abnormality evident       I have personally reviewed the past medical history, past surgical history, medications, social history, and family history, and I haveupdated the database accordingly. Allergies:   Bactrim [sulfamethoxazole-trimethoprim] and Adhesive tape     Review of Systems:     Review of Systems   Constitutional: Positive for fatigue. Negative for activity change. HENT: Negative for congestion. Eyes: Negative for photophobia. Respiratory: Negative for apnea, cough and shortness of breath. Cardiovascular: Negative for chest pain. Gastrointestinal: Negative for abdominal distention. Endocrine: Negative for polyuria. Genitourinary: Negative for dysuria, flank pain and urgency. Musculoskeletal: Positive for arthralgias. Skin: Positive for color change and wound. Allergic/Immunologic: Negative for immunocompromised state. Neurological: Negative for dizziness and seizures. Hematological: Negative for adenopathy. Psychiatric/Behavioral: Negative for agitation. Physical Examination :       Physical Exam  Constitutional:       Appearance: Normal appearance. She is not ill-appearing. HENT:      Head: Normocephalic and atraumatic. Nose: Nose normal.      Mouth/Throat:      Mouth: Mucous membranes are moist.   Eyes:      General: No scleral icterus. Conjunctiva/sclera: Conjunctivae normal.      Pupils: Pupils are equal, round, and reactive to light.    Cardiovascular:      Rate and Rhythm: Normal rate and regular rhythm. Heart sounds: Normal heart sounds. No murmur heard. Pulmonary:      Effort: No respiratory distress. Breath sounds: Normal breath sounds. Abdominal:      General: There is no distension. Palpations: Abdomen is soft. Tenderness: There is no abdominal tenderness. Genitourinary:     Comments: No cullen  Musculoskeletal:         General: No swelling. Cervical back: Neck supple. No rigidity or tenderness. Skin:     General: Skin is dry. Coloration: Skin is not jaundiced. Findings: Erythema and lesion present. Neurological:      Mental Status: She is alert and oriented to person, place, and time. Cranial Nerves: No cranial nerve deficit. Psychiatric:         Mood and Affect: Mood normal.         Thought Content:  Thought content normal.         Past Medical History:     Past Medical History:   Diagnosis Date    Acid reflux 5/29/2017    Acute cystitis with hematuria 10/11/2016    Acute non-recurrent maxillary sinusitis 11/28/2017    Asthma     Bipolar 1 disorder ([address] 1/4/2018    Bipolar disorder, mixed ([address]     BMI 34.0-34.9,adult 11/28/2017    Cannabis use disorder, severe, dependence ([address] 12/19/2017    Cerebrovascular accident (CVA) ([address] 6/14/2017    Chest pain 11/5/2016    Chronic renal insufficiency     Cocaine abuse ([address] 1/5/2018    COVID-19 virus RNA test result unknown     DDD (degenerative disc disease), cervical     Diabetes mellitus ([address]     Dizziness 6/13/2017    Fibromyalgia     History of stroke 9/9/2017    Homicidal ideation 11/6/2017    Hyperglycemia     Hypertension     Hypotension 1/18/2019    IDDM (insulin dependent diabetes mellitus) 12/21/2015    Lupus ([address]     Migraine     Neuropathy     Neuropathy     Polysubstance abuse ([address] 9/17/2017    Post traumatic stress disorder (PTSD)     Posttraumatic stress disorder 5/29/2017    Recurrent depression ([address] 5/29/2017    Recurrent major depressive disorder, in partial remission [facility] 11/28/2017    Screening mammogram, encounter for 11/28/2017    Severe recurrent major depression with psychotic features ([address] 12/19/2017    Severe recurrent major depression without psychotic features ([facility] 12/19/2017    Stroke (cerebrum) ([facility] 6/14/2017    Stroke ([facility]     per chart notes    Suicidal ideation     Suicidal intent 3/10/2017    Vitamin D deficiency 11/28/2017    White matter changes 6/13/2017       Past Surgical  History:     Past Surgical History:   Procedure Laterality Date    ABDOMEN SURGERY      drain tube    ABSCESS DRAINAGE      right buttock    CATARACT REMOVAL WITH IMPLANT Bilateral     CHEST TUBE INSERTION      FINGER AMPUTATION Left 03/13/2021    LEFT RING FINER AMPUTATION performed by Fred Crowder MD at [address] [place] [address] Right 09/15/2021     I&D INDEX FINGER     HAND SURGERY Right 09/14/2021    I&D INDEX FINGER performed by Fred Crowder MD at [facility] Bilateral        Medications:      insulin lispro  0-12 Units SubCUTAneous TID WC    insulin lispro  0-6 Units SubCUTAneous Nightly    insulin glargine  20 Units SubCUTAneous BID    sodium chloride  1,000 mL IntraVENous Once    budesonide-formoterol  2 puff Inhalation BID    pantoprazole  20 mg Oral BID AC    traZODone  150 mg Oral Nightly    pregabalin  75 mg Oral BID    venlafaxine  150 mg Oral Daily with breakfast    sodium chloride flush  5-40 mL IntraVENous 2 times per day    piperacillin-tazobactam  3,375 mg IntraVENous Q8H    daptomycin (CUBICIN) IVPB  4 mg/kg (Ideal) IntraVENous Q24H       Social History:     Social History     Socioeconomic History    Marital status: Legally      Spouse name: Not on file    Number of children: Not on file    Years of education: Not on file    Highest education level: Not on file   Occupational History    Not on file   Tobacco Use    Smoking status: Never Smoker    Smokeless tobacco: Never Used   Vaping Use    Vaping Use: Never used   Substance and Sexual Activity    Alcohol use: Not Currently    Drug use: Yes     Types: Marijuana, Cocaine     Comment: + Cocaine 2/2021 also, see Care Everywhere UDS    Sexual activity: Not Currently     Partners: Male   Other Topics Concern    Not on file   Social History Narrative    Not on file     Social Determinants of Health     Financial Resource Strain: High Risk    Difficulty of Paying Living Expenses: Hard   Food Insecurity: Food Insecurity Present    Worried About Running Out of Food in the Last Year: Often true    Kelin of Food in the Last Year: Often true   Transportation Needs: Unmet Transportation Needs    Lack of Transportation (Medical):  Yes    Lack of Transportation (Non-Medical): Yes   Physical Activity: Inactive    Days of Exercise per Week: 0 days    Minutes of Exercise per Session: 0 min   Stress: Stress Concern Present    Feeling of Stress : Rather much   Social Connections:     Frequency of Communication with Friends and Family:     Frequency of Social Gatherings with Friends and Family:     Attends Sikh Services:     Active Member of Clubs or Organizations:     Attends Club or Organization Meetings:     Marital Status:    Intimate Partner Violence:     Fear of Current or Ex-Partner:     Emotionally Abused:     Physically Abused:     Sexually Abused:        Family History:     Family History   Problem Relation Age of Onset    Diabetes Mother     Stroke Mother     Diabetes Father     Diabetes Sister     Diabetes Brother     Other Son         GSW    No Known Problems Sister       Medical Decision Making:   I have independently reviewed/ordered the following labs:    CBC with Differential:   Recent Labs     09/14/21  0703 09/15/21  0648   WBC 9.4 10.4   HGB 10.7* 8.9*   HCT 36.2* 28.2*   PLT 59* 329   LYMPHOPCT 9* 16*   MONOPCT 9 14*     BMP:  Recent Labs     09/14/21  2353 09/15/21  0648   NA

## 2021-09-16 NOTE — PROGRESS NOTES
right index finger abscess.      In the ED, the patient was afebrile Temp 98.5F, RR 18, HR 98, /123, SpO2 95%. Labs reviewed: Na 126, K 4.9, bicarb 16, BUN 11, Cr 1.08, Glucose 719, beta-hydroxy 4.42, WBC 9.4, Hgb 10.7, Plt 59, ESR 69, Blood gas: pH 7.283/ pCO2 53.7/ pO2 20.1/ HCO3 24.6Covid negative, wound culture shows gram negative rods. The patient received 10 units of insulin in the ED and was started on DKA protocol with insulin gtt and NS IVF. She was started on Vanc and Zosyn. UA, CXR, HbA1c ordered, will follow up.     OBJECTIVE     Vital Signs:  BP (!) 156/138   Pulse 83   Temp 98.8 °F (37.1 °C) (Oral)   Resp 16   Ht 5' 6\" (1.676 m)   Wt 238 lb 9.6 oz (108.2 kg)   LMP  (LMP Unknown)   SpO2 94%   BMI 38.51 kg/m²     Temp (24hrs), Av.6 °F (37 °C), Min:98.1 °F (36.7 °C), Max:99.3 °F (37.4 °C)    In: 864   Out: -     Physical Exam:  Constitutional: This is a well developed, well nourished, 35-39.9 - Obesity Grade II 49y.o. year old female who is alert, oriented, cooperative and in no apparent distress. Head:normocephalic and atraumatic. EENT:  PERRLA. No conjunctival injections. Septum was midline, mucosa was without erythema, exudates or cobblestoning. No thrush was noted. Neck: Supple without thyromegaly. No elevated JVP. Trachea was midline. Respiratory: Chest was symmetrical without dullness to percussion. Breath sounds bilaterally were clear to auscultation. There were no wheezes, rhonchi or rales. There is no intercostal retraction or use of accessory muscles. No egophony noted. Cardiovascular: Regular without murmur, clicks, gallops or rubs. Abdomen: Slightly rounded and soft without organomegaly. No rebound, rigidity or guarding was appreciated. Lymphatic: No lymphadenopathy. Musculoskeletal: Normal curvature of the spine. No gross muscle weakness. Extremities:  No lower extremity edema, ulcerations, tenderness, varicosities or erythema.   Muscle size, tone and strength are normal.  No involuntary movements are noted. Skin:  Warm and dry. Good color, turgor and pigmentation. No lesions or scars. No cyanosis or clubbing  Neurological/Psychiatric: The patient's general behavior, level of consciousness, thought content and emotional status is normal.        Medications:  Scheduled Medications:    fluconazole  200 mg Oral Daily    insulin lispro  0-12 Units SubCUTAneous TID WC    insulin lispro  0-6 Units SubCUTAneous Nightly    insulin glargine  20 Units SubCUTAneous BID    sodium chloride  1,000 mL IntraVENous Once    budesonide-formoterol  2 puff Inhalation BID    pantoprazole  20 mg Oral BID AC    traZODone  150 mg Oral Nightly    pregabalin  75 mg Oral BID    venlafaxine  150 mg Oral Daily with breakfast    sodium chloride flush  5-40 mL IntraVENous 2 times per day    piperacillin-tazobactam  3,375 mg IntraVENous Q8H    daptomycin (CUBICIN) IVPB  4 mg/kg (Ideal) IntraVENous Q24H     Continuous Infusions:    sodium chloride 100 mL/hr at 09/15/21 0849    dextrose      sodium chloride       PRN Medicationsglucose, 15 g, PRN  dextrose, 12.5 g, PRN  glucagon (rDNA), 1 mg, PRN  dextrose, 100 mL/hr, PRN  albuterol sulfate HFA, 2 puff, Q4H PRN  sodium chloride flush, 5-40 mL, PRN  sodium chloride, 25 mL, PRN  ondansetron, 4 mg, Q8H PRN   Or  ondansetron, 4 mg, Q6H PRN  polyethylene glycol, 17 g, Daily PRN  acetaminophen, 650 mg, Q6H PRN   Or  acetaminophen, 650 mg, Q6H PRN  dextrose, 12.5 g, PRN  oxyCODONE, 5 mg, Q4H PRN        Diagnostic Labs:  CBC:   Recent Labs     09/14/21  0703 09/15/21  0648 09/16/21  0732   WBC 9.4 10.4 8.0   RBC 3.40* 2.79* 2.58*   HGB 10.7* 8.9* 8.0*   HCT 36.2* 28.2* 26.3*   .5* 101.1 101.9   RDW 13.2 13.2 13.2   PLT 59* 329 323     BMP:   Recent Labs     09/14/21  1814 09/14/21  1814 09/14/21  2353 09/15/21  0648 09/16/21  0732   *   < > Unable to perform testing: Specimen contaminated.  134* 132*   K 3.8   < > PLEASE DISREGARD RESULTS. SPECIMEN CONTAMINATED. 3.7 4.1   CL 98   < > PLEASE DISREGARD RESULTS. SPECIMEN CONTAMINATED. 98 102   CO2 20   < > Unable to perform testing: Specimen contaminated. 20 21   PHOS 3.0  --  PLEASE DISREGARD RESULTS. SPECIMEN CONTAMINATED. 3.0  --    BUN 11   < > PLEASE DISREGARD RESULTS. SPECIMEN CONTAMINATED. 11 16   CREATININE 1.09*   < > PLEASE DISREGARD RESULTS. SPECIMEN CONTAMINATED. 1.40* 1.59*    < > = values in this interval not displayed. BNP: No results for input(s): BNP in the last 72 hours. PT/INR: No results for input(s): PROTIME, INR in the last 72 hours. APTT: No results for input(s): APTT in the last 72 hours. CARDIAC ENZYMES: No results for input(s): CKMB, CKMBINDEX, TROPONINI in the last 72 hours. Invalid input(s): CKTOTAL;3  FASTING LIPID PANEL:  Lab Results   Component Value Date    CHOL 275 (H) 12/30/2020    HDL 80 12/30/2020    TRIG 246 (H) 12/30/2020     LIVER PROFILE:   Recent Labs     09/14/21  1511   AST 10   ALT 7   BILIDIR 0.15   BILITOT 0.37   ALKPHOS 161*      MICROBIOLOGY:   Lab Results   Component Value Date/Time    CULTURE NO GROWTH 2 DAYS 09/14/2021 03:06 PM       Imaging:    XR HAND RIGHT (MIN 3 VIEWS)    Result Date: 9/14/2021  Marked soft tissue swelling involving the 2nd finger and dorsum of the hand. No acute osseous abnormality evident. CT HEAD WO CONTRAST    Result Date: 9/15/2021  1. No acute intracranial abnormality. 2.  White matter hypoattenuation described is typical of microvascular ischemic disease or as sequela of dysmyelinating/demyelinating processes. 3.  Vascular calcifications are noted reflecting calcific atherosclerosis.      XR CHEST PORTABLE    Result Date: 9/15/2021  Remote granulomatous disease No acute cardiopulmonary findings       ASSESSMENT & PLAN     ASSESSMENT / PLAN:     Tenosynovitis Right index finger  - s/p drainage of abscess per plastic   - Plastic surgery following- wound care   - Received 1 dose of Vancomycin and TDAP vaccine shot  - ID following: Continue on Zosyn and Daptomycin; Discontiued vancomycin, started Daptomycin. - CPK 71        DKA  - Glucose 719, beta-hydroxy 4.42  - blood gas: pH 7.283/ pCO2 53.7/ pO2 20.1/ HCO3 24.6  - Patient received 10 units insulin and started on DKA protocol with insulin gtt and IVF in the ED  - On IVF NS @ 100 mL/h   - Patient is on home Lantus 20 units BID  - HbA1c 14. 2- non compliant with insulin at home  - Blood glucose: 313<--366<--318<--185  - Lantus 20 units BID; medium dose sliding scale  - Hypoglycemia protocol;   - Adult diet- advance as tolerated           Polysubstance abuse  - Urine tox: positive for benzodiazepine, cocaine and opiates  - Avoid beta blockers  - Monitor for withdrawal symptoms  - Monitor BP closely  - erratic behavior overnight, patient wanted to leave AMA, refused lab draws         HTN  - Controlled  - possibly secondary to polysubstance abuse  - Monitor BP; avoid beta-blockers        Thrombocytopenia- Plt 323- resolved  - secondary to infection vs. Drug abuse  - PEripehral smear ordered, will follow up  - Continue to monitor       MARTIN- Cr 1.08 (0.8~0.9) -->1.40--> 1.59  - Continue IVF  - Continue to monitor        DVT ppx: Heparin sq  GI ppx: Protonix     PT/OT: On board  Discharge Planning:  consulted, will follow up      Christi Jones MD  Internal Medicine Resident, PGY-1  [facility];  [place], [place]  9/16/2021, 4:26 PM

## 2021-09-16 NOTE — PLAN OF CARE
Problem: Skin Integrity:  Goal: Will show no infection signs and symptoms  Description: Will show no infection signs and symptoms  9/15/2021 2145 by Jemal Goldsmith RN  Outcome: Ongoing  9/15/2021 1040 by Ruy Daly RN  Outcome: Ongoing  Goal: Absence of new skin breakdown  Description: Absence of new skin breakdown  9/15/2021 2145 by Jemal Goldsmith RN  Outcome: Ongoing  9/15/2021 1040 by Ruy Daly RN  Outcome: Ongoing     Problem: Falls - Risk of:  Goal: Will remain free from falls  Description: Will remain free from falls  9/15/2021 2145 by Jemal Goldsmith RN  Outcome: Ongoing  9/15/2021 1040 by Ruy Daly RN  Outcome: Ongoing  Goal: Absence of physical injury  Description: Absence of physical injury  9/15/2021 2145 by Jemal Goldsmith RN  Outcome: Ongoing  9/15/2021 1040 by Ruy Daly RN  Outcome: Ongoing     Problem: Pain:  Goal: Pain level will decrease  Description: Pain level will decrease  Outcome: Ongoing  Goal: Control of acute pain  Description: Control of acute pain  Outcome: Ongoing  Goal: Control of chronic pain  Description: Control of chronic pain  Outcome: Ongoing

## 2021-09-16 NOTE — PROGRESS NOTES
Physical Therapy        Physical Therapy Cancel Note      DATE: 2021    NAME: Jordyn Spain  MRN: [id number]   : 1965      Patient not seen this date for Physical Therapy due to: Other: pt agitated regarding breakfast order; educated pt on importance of mobility and purpose of PT, uanble to redirect. RN notified and dietary contacted.        Electronically signed by Deep Willis PT on 2021 at 2:06 PM

## 2021-09-17 ENCOUNTER — APPOINTMENT (OUTPATIENT)
Dept: ULTRASOUND IMAGING | Age: 56
DRG: 316 | End: 2021-09-17
Payer: COMMERCIAL

## 2021-09-17 LAB
ABSOLUTE EOS #: 0.19 K/UL (ref 0–0.44)
ABSOLUTE IMMATURE GRANULOCYTE: 0.06 K/UL (ref 0–0.3)
ABSOLUTE LYMPH #: 2.35 K/UL (ref 1.1–3.7)
ABSOLUTE MONO #: 1.05 K/UL (ref 0.1–1.2)
ANION GAP SERPL CALCULATED.3IONS-SCNC: 12 MMOL/L (ref 9–17)
BASOPHILS # BLD: 1 % (ref 0–2)
BASOPHILS ABSOLUTE: 0.05 K/UL (ref 0–0.2)
BUN BLDV-MCNC: 12 MG/DL (ref 6–20)
BUN/CREAT BLD: ABNORMAL (ref 9–20)
CALCIUM SERPL-MCNC: 8.2 MG/DL (ref 8.6–10.4)
CHLORIDE BLD-SCNC: 104 MMOL/L (ref 98–107)
CO2: 18 MMOL/L (ref 20–31)
CREAT SERPL-MCNC: 1.23 MG/DL (ref 0.5–0.9)
DIFFERENTIAL TYPE: ABNORMAL
EOSINOPHILS RELATIVE PERCENT: 2 % (ref 1–4)
GFR AFRICAN AMERICAN: 55 ML/MIN
GFR NON-AFRICAN AMERICAN: 46 ML/MIN
GFR SERPL CREATININE-BSD FRML MDRD: ABNORMAL ML/MIN/{1.73_M2}
GFR SERPL CREATININE-BSD FRML MDRD: ABNORMAL ML/MIN/{1.73_M2}
GLUCOSE BLD-MCNC: 221 MG/DL (ref 65–105)
GLUCOSE BLD-MCNC: 247 MG/DL (ref 65–105)
GLUCOSE BLD-MCNC: 259 MG/DL (ref 65–105)
GLUCOSE BLD-MCNC: 319 MG/DL (ref 70–99)
HCT VFR BLD CALC: 29.4 % (ref 36.3–47.1)
HEMOGLOBIN: 8.7 G/DL (ref 11.9–15.1)
IMMATURE GRANULOCYTES: 1 %
LYMPHOCYTES # BLD: 27 % (ref 24–43)
MCH RBC QN AUTO: 31 PG (ref 25.2–33.5)
MCHC RBC AUTO-ENTMCNC: 29.6 G/DL (ref 28.4–34.8)
MCV RBC AUTO: 104.6 FL (ref 82.6–102.9)
MONOCYTES # BLD: 12 % (ref 3–12)
NRBC AUTOMATED: 0 PER 100 WBC
PDW BLD-RTO: 13.2 % (ref 11.8–14.4)
PLATELET # BLD: 395 K/UL (ref 138–453)
PLATELET ESTIMATE: ABNORMAL
PMV BLD AUTO: 10.7 FL (ref 8.1–13.5)
POTASSIUM SERPL-SCNC: 4.3 MMOL/L (ref 3.7–5.3)
RBC # BLD: 2.81 M/UL (ref 3.95–5.11)
RBC # BLD: ABNORMAL 10*6/UL
SEG NEUTROPHILS: 57 % (ref 36–65)
SEGMENTED NEUTROPHILS ABSOLUTE COUNT: 4.91 K/UL (ref 1.5–8.1)
SODIUM BLD-SCNC: 134 MMOL/L (ref 135–144)
WBC # BLD: 8.6 K/UL (ref 3.5–11.3)
WBC # BLD: ABNORMAL 10*3/UL

## 2021-09-17 PROCEDURE — 6370000000 HC RX 637 (ALT 250 FOR IP): Performed by: STUDENT IN AN ORGANIZED HEALTH CARE EDUCATION/TRAINING PROGRAM

## 2021-09-17 PROCEDURE — 6370000000 HC RX 637 (ALT 250 FOR IP): Performed by: INTERNAL MEDICINE

## 2021-09-17 PROCEDURE — 85025 COMPLETE CBC W/AUTO DIFF WBC: CPT

## 2021-09-17 PROCEDURE — 6370000000 HC RX 637 (ALT 250 FOR IP)

## 2021-09-17 PROCEDURE — 99232 SBSQ HOSP IP/OBS MODERATE 35: CPT | Performed by: INTERNAL MEDICINE

## 2021-09-17 PROCEDURE — 90792 PSYCH DIAG EVAL W/MED SRVCS: CPT | Performed by: PSYCHIATRY & NEUROLOGY

## 2021-09-17 PROCEDURE — 6370000000 HC RX 637 (ALT 250 FOR IP): Performed by: PLASTIC SURGERY

## 2021-09-17 PROCEDURE — 76770 US EXAM ABDO BACK WALL COMP: CPT

## 2021-09-17 PROCEDURE — 76937 US GUIDE VASCULAR ACCESS: CPT

## 2021-09-17 PROCEDURE — 36415 COLL VENOUS BLD VENIPUNCTURE: CPT

## 2021-09-17 PROCEDURE — 6360000002 HC RX W HCPCS

## 2021-09-17 PROCEDURE — 80048 BASIC METABOLIC PNL TOTAL CA: CPT

## 2021-09-17 PROCEDURE — 2580000003 HC RX 258: Performed by: PLASTIC SURGERY

## 2021-09-17 PROCEDURE — 94640 AIRWAY INHALATION TREATMENT: CPT

## 2021-09-17 PROCEDURE — 1200000000 HC SEMI PRIVATE

## 2021-09-17 PROCEDURE — 82947 ASSAY GLUCOSE BLOOD QUANT: CPT

## 2021-09-17 RX ORDER — INSULIN GLARGINE 100 [IU]/ML
5 INJECTION, SOLUTION SUBCUTANEOUS ONCE
Status: COMPLETED | OUTPATIENT
Start: 2021-09-17 | End: 2021-09-17

## 2021-09-17 RX ORDER — AMOXICILLIN 500 MG/1
500 CAPSULE ORAL EVERY 8 HOURS SCHEDULED
Qty: 63 CAPSULE | Refills: 0 | Status: SHIPPED | OUTPATIENT
Start: 2021-09-17 | End: 2021-09-24

## 2021-09-17 RX ORDER — AMOXICILLIN 500 MG/1
500 CAPSULE ORAL EVERY 8 HOURS SCHEDULED
Status: DISCONTINUED | OUTPATIENT
Start: 2021-09-17 | End: 2021-09-24

## 2021-09-17 RX ORDER — VORICONAZOLE 200 MG/1
200 TABLET, FILM COATED ORAL EVERY 12 HOURS SCHEDULED
Status: DISCONTINUED | OUTPATIENT
Start: 2021-09-17 | End: 2021-09-20

## 2021-09-17 RX ORDER — INSULIN GLARGINE 100 [IU]/ML
25 INJECTION, SOLUTION SUBCUTANEOUS 2 TIMES DAILY
Status: DISCONTINUED | OUTPATIENT
Start: 2021-09-17 | End: 2021-09-19

## 2021-09-17 RX ORDER — VORICONAZOLE 200 MG/1
200 TABLET, FILM COATED ORAL EVERY 12 HOURS SCHEDULED
Qty: 42 TABLET | Refills: 0 | Status: SHIPPED | OUTPATIENT
Start: 2021-09-17 | End: 2021-09-20 | Stop reason: HOSPADM

## 2021-09-17 RX ADMIN — INSULIN GLARGINE 20 UNITS: 100 INJECTION, SOLUTION SUBCUTANEOUS at 08:30

## 2021-09-17 RX ADMIN — PANTOPRAZOLE SODIUM 20 MG: 20 TABLET, DELAYED RELEASE ORAL at 06:27

## 2021-09-17 RX ADMIN — OXYCODONE HYDROCHLORIDE 5 MG: 5 TABLET ORAL at 00:39

## 2021-09-17 RX ADMIN — ACETAMINOPHEN 650 MG: 325 TABLET ORAL at 13:00

## 2021-09-17 RX ADMIN — PREGABALIN 75 MG: 75 CAPSULE ORAL at 08:29

## 2021-09-17 RX ADMIN — HEPARIN SODIUM 5000 UNITS: 5000 INJECTION INTRAVENOUS; SUBCUTANEOUS at 22:20

## 2021-09-17 RX ADMIN — PANTOPRAZOLE SODIUM 20 MG: 20 TABLET, DELAYED RELEASE ORAL at 17:14

## 2021-09-17 RX ADMIN — INSULIN LISPRO 2 UNITS: 100 INJECTION, SOLUTION INTRAVENOUS; SUBCUTANEOUS at 22:19

## 2021-09-17 RX ADMIN — INSULIN GLARGINE 5 UNITS: 100 INJECTION, SOLUTION SUBCUTANEOUS at 11:36

## 2021-09-17 RX ADMIN — OXYCODONE HYDROCHLORIDE 5 MG: 5 TABLET ORAL at 06:30

## 2021-09-17 RX ADMIN — AMOXICILLIN 500 MG: 500 CAPSULE ORAL at 14:27

## 2021-09-17 RX ADMIN — TRAZODONE HYDROCHLORIDE 150 MG: 100 TABLET ORAL at 19:59

## 2021-09-17 RX ADMIN — INSULIN LISPRO 6 UNITS: 100 INJECTION, SOLUTION INTRAVENOUS; SUBCUTANEOUS at 11:40

## 2021-09-17 RX ADMIN — PREGABALIN 75 MG: 75 CAPSULE ORAL at 19:59

## 2021-09-17 RX ADMIN — INSULIN LISPRO 4 UNITS: 100 INJECTION, SOLUTION INTRAVENOUS; SUBCUTANEOUS at 17:05

## 2021-09-17 RX ADMIN — BUDESONIDE AND FORMOTEROL FUMARATE DIHYDRATE 2 PUFF: 80; 4.5 AEROSOL RESPIRATORY (INHALATION) at 08:29

## 2021-09-17 RX ADMIN — VENLAFAXINE HYDROCHLORIDE 150 MG: 150 CAPSULE, EXTENDED RELEASE ORAL at 08:29

## 2021-09-17 RX ADMIN — AMOXICILLIN 500 MG: 500 CAPSULE ORAL at 22:20

## 2021-09-17 RX ADMIN — VORICONAZOLE 200 MG: 200 TABLET ORAL at 19:59

## 2021-09-17 RX ADMIN — OXYCODONE HYDROCHLORIDE 5 MG: 5 TABLET ORAL at 21:16

## 2021-09-17 RX ADMIN — INSULIN LISPRO 8 UNITS: 100 INJECTION, SOLUTION INTRAVENOUS; SUBCUTANEOUS at 08:33

## 2021-09-17 RX ADMIN — ACETAMINOPHEN 650 MG: 325 TABLET ORAL at 06:32

## 2021-09-17 RX ADMIN — OXYCODONE HYDROCHLORIDE 5 MG: 5 TABLET ORAL at 17:10

## 2021-09-17 RX ADMIN — BUDESONIDE AND FORMOTEROL FUMARATE DIHYDRATE 2 PUFF: 80; 4.5 AEROSOL RESPIRATORY (INHALATION) at 21:04

## 2021-09-17 RX ADMIN — OXYCODONE HYDROCHLORIDE 5 MG: 5 TABLET ORAL at 11:21

## 2021-09-17 RX ADMIN — FLUCONAZOLE 200 MG: 200 TABLET ORAL at 08:29

## 2021-09-17 RX ADMIN — SODIUM CHLORIDE, PRESERVATIVE FREE 10 ML: 5 INJECTION INTRAVENOUS at 20:00

## 2021-09-17 RX ADMIN — ACETAMINOPHEN 650 MG: 325 TABLET ORAL at 00:42

## 2021-09-17 RX ADMIN — ACETAMINOPHEN 650 MG: 325 TABLET ORAL at 12:53

## 2021-09-17 RX ADMIN — INSULIN GLARGINE 25 UNITS: 100 INJECTION, SOLUTION SUBCUTANEOUS at 22:20

## 2021-09-17 ASSESSMENT — PAIN SCALES - GENERAL
PAINLEVEL_OUTOF10: 9
PAINLEVEL_OUTOF10: 9
PAINLEVEL_OUTOF10: 10

## 2021-09-17 ASSESSMENT — ENCOUNTER SYMPTOMS
APNEA: 0
EYE REDNESS: 0
COLOR CHANGE: 1
SHORTNESS OF BREATH: 0
PHOTOPHOBIA: 0
COUGH: 0
ABDOMINAL DISTENTION: 0

## 2021-09-17 ASSESSMENT — PAIN DESCRIPTION - LOCATION: LOCATION: HAND

## 2021-09-17 ASSESSMENT — PAIN DESCRIPTION - PAIN TYPE: TYPE: CHRONIC PAIN

## 2021-09-17 ASSESSMENT — PAIN DESCRIPTION - PROGRESSION: CLINICAL_PROGRESSION: NOT CHANGED

## 2021-09-17 ASSESSMENT — PAIN DESCRIPTION - ORIENTATION: ORIENTATION: RIGHT

## 2021-09-17 NOTE — PROGRESS NOTES
Physical Therapy        Physical Therapy Cancel Note      DATE: 2021    NAME: Sid Arnold  MRN: [id number]   : 1965      Patient not seen this date for Physical Therapy due to:    Testing: ultrasound.  PT will check back as time allows        Electronically signed by Frances Sharma PT on 2021 at 10:19 AM

## 2021-09-17 NOTE — CONSULTS
Department of Psychiatry  Behavioral Health Consult    REASON FOR CONSULT: Change in behavior and labile mood    CONSULTING PHYSICIAN: Dr. Helder Perdomo    History obtained from: Patient and chart    HISTORY OF PRESENT ILLNESS:    The patient is a 49 y.o. female with significant past medical history of type 2 diabetes mellitus, HTN and a past psychiatric history of bipolar disorder and depression  who presents with right index finger pain following a fall. The patient was found to be in DKA and had an infection and was admitted to medicine. The patient was seen using telehealth equipment.   -The patient was limited in engagement. She kept her eyes closed intermittently while answering questions.  -She said that she fell and scraped her hand. It is now infected. -Feels very sad, depressed and tearful. She has been prescribed medications for depression in the past. She was not on medications for the last several months. Denies any suicidal ideation.  -The patient reports a number of stressful life events. Her daughter  on Mick day. Her son was murdered in October years ago. Her   past March. She feels very sad, lonely and tearful.   -Says her grand kids were quarantined at a hotel for over a month. She has been interacting with them using electronic devices but feels very upset about the situation. She also explanation for why they were quarantined for so long.  -Says she doesn't eat or sleep well. -Jennifer screen- Replies with \"I guess but I didn't realize it\". Did not give yes or no answers to specific jennifer questions.  -Somewhat dramatic in answers; not answering questions- When asked about recent newsworthy events, she said \" I know and I don't want to know\". PTSD screen- son was murdered by nephew. Gets nightmares and flashbacks frequently. She ruminates a lot about this.     -Oriented to time place and person.  Limited cooperation with the cognitive exam but little evidence of significant cognitive impairment      The patient is not currently receiving care for the above psychiatric illness. Psychiatric Review of Systems        ·    Obsessions and Compulsions: Denies    ·    Jennifer or Hypomania: Denies  ·    Hallucinations: Denies  ·    Panic Attacks:  Denies  ·    Delusions:  Denies  ·    Phobias:  Denies  ·    Trauma: As noted above      Substance Abuse History:  ETOH: Can go 3-4 months without drinking but can have as many as 8 drinks at a time  Marijuana: uses edibles  Opiates: denies  Other Drugs: occasional cocaine use      Past Psychiatric History:  Prior Diagnosis: Bipolar disorder  She has seen somebody in the past.   Was on medications 2-3 months ago. Was seeing her Primary care physican    Hospitalization: yes-many years ago  Hx of Suicidal Attempts: yes- years ago she took an overdose  Hx of violence:  no      Personal History: Lives alone. She gets disability. Says she has little support.  Her kids and grandkids were quarantined for over a month    Past Medical History:        Diagnosis Date    Acid reflux 5/29/2017    Acute cystitis with hematuria 10/11/2016    Acute non-recurrent maxillary sinusitis 11/28/2017    Asthma     Bipolar 1 disorder ([address] 1/4/2018    Bipolar disorder, mixed ([address]     BMI 34.0-34.9,adult 11/28/2017    Cannabis use disorder, severe, dependence ([address] 12/19/2017    Cerebrovascular accident (CVA) ([address] 6/14/2017    Chest pain 11/5/2016    Chronic renal insufficiency     Cocaine abuse ([address] 1/5/2018    COVID-19 virus RNA test result unknown     DDD (degenerative disc disease), cervical     Diabetes mellitus ([address]     Dizziness 6/13/2017    Fibromyalgia     History of stroke 9/9/2017    Homicidal ideation 11/6/2017    Hyperglycemia     Hypertension     Hypotension 1/18/2019    IDDM (insulin dependent diabetes mellitus) 12/21/2015    Lupus (HCC)     Migraine     Neuropathy     Neuropathy     Polysubstance abuse ([address] 9/17/2017    Post traumatic stress disorder (PTSD)     Posttraumatic stress disorder 5/29/2017    Recurrent depression ([facility] 5/29/2017    Recurrent major depressive disorder, in partial remission ([address] 11/28/2017    Screening mammogram, encounter for 11/28/2017    Severe recurrent major depression with psychotic features ([address] 12/19/2017    Severe recurrent major depression without psychotic features ([address] 12/19/2017    Stroke (cerebrum) ([facility] 6/14/2017    Stroke ([facility]     per chart notes    Suicidal ideation     Suicidal intent 3/10/2017    Vitamin D deficiency 11/28/2017    White matter changes 6/13/2017       Past Surgical History:        Procedure Laterality Date    ABDOMEN SURGERY      drain tube    ABSCESS DRAINAGE      right buttock    CATARACT REMOVAL WITH IMPLANT Bilateral     CHEST TUBE INSERTION      FINGER AMPUTATION Left 03/13/2021    LEFT RING FINER AMPUTATION performed by Jie Garcia MD at [address] Right 09/15/2021     I&D INDEX FINGER     HAND SURGERY Right 09/14/2021    I&D INDEX FINGER performed by Jie Garcia MD at [id number] [place] [address] Right 9/15/2021    I&D INDEX FINGER performed by Jie Garcia MD at [facility] Bilateral          Medications Prior to Admission:   Medications Prior to Admission: cetirizine (ZYRTEC) 10 MG tablet, Take 1 tablet by mouth daily  insulin glargine (LANTUS SOLOSTAR) 100 UNIT/ML injection pen, Inject 20 Units into the skin 2 times daily  pregabalin (LYRICA) 75 MG capsule, Take 1 capsule by mouth 2 times daily for 30 days. pantoprazole (PROTONIX) 20 MG tablet, Take 1 tablet by mouth 2 times daily (before meals)  metFORMIN (GLUCOPHAGE) 1000 MG tablet, Take 1 tablet by mouth 2 times daily (with meals)  venlafaxine (EFFEXOR XR) 150 MG extended release capsule, Take 1 capsule by mouth daily (with breakfast)  dicyclomine (BENTYL) 10 MG capsule, Take 1 capsule by mouth 4 times daily  Misc.  Devices MISC, 1 pair of dm shoes, 3 accommodated inserts  Insulin Pen Needle ([employer] PEN NEEDLES 31G) 31G X 8 MM MISC, 1 each by Does not apply route daily   MG capsule, TAKE 1 CAPSULE BY MOUTH TWICE DAILY  traZODone (DESYREL) 150 MG tablet, Take 1 tablet by mouth nightly  FREESTYLE LITE strip, 1 each by Does not apply route 3 times daily  FreeStyle Lancets MISC, 1 each by Does not apply route 3 times daily  Alcohol Swabs (ALCOHOL PREP) 70 % PADS, 1 each by Does not apply route as needed (use as needed) Use_____times per day Diagnosis: 250.0   Diabetes ___Insulin-Dependent___Non-Insulin Dependent  albuterol sulfate  (90 Base) MCG/ACT inhaler, Inhale 2 puffs into the lungs every 4 hours as needed for Wheezing  FLOVENT  MCG/ACT inhaler, Inhale 2 puffs into the lungs 2 times daily  budesonide-formoterol (SYMBICORT) 80-4.5 MCG/ACT AERO, Inhale 2 puffs into the lungs 2 times daily    Allergies:  Bactrim [sulfamethoxazole-trimethoprim] and Adhesive tape    FAMILY/SOCIAL HISTORY:  Family History   Problem Relation Age of Onset    Diabetes Mother     Stroke Mother     Diabetes Father     Diabetes Sister     Diabetes Brother     Other Son         GSW    No Known Problems Sister      Social History     Socioeconomic History    Marital status: Legally      Spouse name: Not on file    Number of children: Not on file    Years of education: Not on file    Highest education level: Not on file   Occupational History    Not on file   Tobacco Use    Smoking status: Never Smoker    Smokeless tobacco: Never Used   Vaping Use    Vaping Use: Never used   Substance and Sexual Activity    Alcohol use: Not Currently    Drug use: Yes     Types: Marijuana, Cocaine     Comment: + Cocaine 2/2021 also, see Care Everywhere UDS    Sexual activity: Not Currently     Partners: Male   Other Topics Concern    Not on file   Social History Narrative    Not on file     Social Determinants of Health     Financial Resource Strain: High Risk  Difficulty of Paying Living Expenses: Hard   Food Insecurity: Food Insecurity Present    Worried About Running Out of Food in the Last Year: Often true    Kelin of Food in the Last Year: Often true   Transportation Needs: Unmet Transportation Needs    Lack of Transportation (Medical): Yes    Lack of Transportation (Non-Medical): Yes   Physical Activity: Inactive    Days of Exercise per Week: 0 days    Minutes of Exercise per Session: 0 min   Stress: Stress Concern Present    Feeling of Stress : Rather much   Social Connections:     Frequency of Communication with Friends and Family:     Frequency of Social Gatherings with Friends and Family:     Attends Baptism Services:     Active Member of Clubs or Organizations:     Attends Club or Organization Meetings:     Marital Status:    Intimate Partner Violence:     Fear of Current or Ex-Partner:     Emotionally Abused:     Physically Abused:     Sexually Abused:        REVIEW OF SYSTEMS    Constitutional: [] fever  [] chills  [] weight loss  []weakness [] Other:  Eyes:  [] photophobia  [] discharge [] acuity change   [] Diplopia   [] Other:  HENT:  [] sore throat  [] ear pain [] Tinnitus   [] Other  Respiratory:  [] Cough  [] Shortness of breath   [] Sputum   [] Other:   Cardiac: []Chest pain   []Palpitations []Edema  []PND  [] Other:  GI:  []Abdominal pain   []Nausea  []Vomiting  []Diarrhea  [] Other:  :  [] Dysuria   []Frequency  []Hematuria  []Discharge  [] Other:  Possible Pregnancy: []Yes   []No   LMP:   Musculoskeletal:  []Back pain  []Neck pain  []Recent Injury   Skin:  []Rash  [] Itching  [] Other:  Neurologic:  [] Headache  [] Focal weakness  [] Sensory changes []Other:  Endocrine:  [] Polyuria  [] Polydipsia  [] Hair Loss  [] Other:  Lymphatic:   [] Swollen glands   Psychiatric:  As per HPI      All other systems negative except as marked or mentioned/indicated in the HPI. Heddy       PHYSICAL EXAM:  Vitals:  BP 96/73   Pulse 88   Temp 98 °F (36.7 °C) (Oral)   Resp 16   Ht 5' 6\" (1.676 m)   Wt 238 lb 9.6 oz (108.2 kg)   LMP  (LMP Unknown)   SpO2 95%   BMI 38.51 kg/m²      Neuro Exam:     Involuntary Movements: No    Mental Status Examination:    Level of consciousness:  within normal limits   Appearance:  hospital attire  Behavior/Motor:  no abnormalities noted  Attitude toward examiner: Generally cooperative but intermittently evasive  Speech:  spontaneous, normal rate and normal volume   Mood: depressed  Affect:  mood congruent  Thought processes:  linear, goal directed and coherent   Thought content:  Suicidal Ideation:  denies suicidal ideation  Delusions:  no evidence of delusions  Perceptual Disturbance:  denies any perceptual disturbance  Cognition:  oriented to person, place, and time   Concentration Clinically adequate  Memory intact  Insight fair   Judgement fair   Fund of Knowledge adequate        LABS: REVIEWED TODAY:  Recent Labs     09/15/21  0648 09/16/21  0732 09/17/21  0629   WBC 10.4 8.0 8.6   HGB 8.9* 8.0* 8.7*    323 395     Recent Labs     09/15/21  0648 09/16/21  0732 09/17/21  0629   * 132* 134*   K 3.7 4.1 4.3   CL 98 102 104   CO2 20 21 18*   BUN 11 16 12   CREATININE 1.40* 1.59* 1.23*   GLUCOSE 167* 337* 319*     Recent Labs     09/14/21  1511   BILITOT 0.37   ALKPHOS 161*   AST 10   ALT 7     Lab Results   Component Value Date    BARBSCNU NEGATIVE 09/14/2021    LABBENZ POSITIVE 09/14/2021    LABMETH NEGATIVE 09/14/2021    PPXUR NOT REPORTED 09/14/2021     Lab Results   Component Value Date    TSH 0.82 09/16/2021     No results found for: LITHIUM  No results found for: VALPROATE, CBMZ  No results found for: LITHIUM, VALPROATE    FURTHER LABS ORDERED :      Radiology   XR HAND RIGHT (MIN 3 VIEWS)    Result Date: 9/14/2021  EXAMINATION: THREE XRAY VIEWS OF THE RIGHT HAND 9/14/2021 6:05 am COMPARISON: None.  HISTORY: ORDERING SYSTEM PROVIDED HISTORY: R index finger swelling with discolouration TECHNOLOGIST PROVIDED HISTORY: R index finger swelling with discolouration FINDINGS: No fracture, dislocation or joint abnormality is identified. Bone density is normal.  Marked soft tissue swelling is present throughout the 2nd finger and over the dorsum of the hand. At the volar aspect of the 3rd or 4th middle phalanx at the proximal metadiaphyseal junction region, there is a bony defect likely related to old trauma. Marked soft tissue swelling involving the 2nd finger and dorsum of the hand. No acute osseous abnormality evident. CT HEAD WO CONTRAST    Result Date: 9/15/2021  EXAMINATION: CT OF THE HEAD WITHOUT CONTRAST  9/15/2021 4:18 pm TECHNIQUE: CT of the head was performed without the administration of intravenous contrast. Dose modulation, iterative reconstruction, and/or weight based adjustment of the mA/kV was utilized to reduce the radiation dose to as low as reasonably achievable. COMPARISON: Head CT 07/07/2020 HISTORY: ORDERING SYSTEM PROVIDED HISTORY: mechanical fall TECHNOLOGIST PROVIDED HISTORY: mechanical fall Is the patient pregnant?->No Reason for Exam: mechanical fall FINDINGS: BRAIN/VENTRICLES: There is no acute intracranial hemorrhage, mass effect or midline shift. No abnormal extra-axial fluid collection. The gray-white differentiation is maintained without evidence of an acute infarct. There is prominence of the ventricles and sulci due to global parenchymal volume loss. There are nonspecific areas of hypoattenuation within the periventricular and subcortical white matter, which likely represent chronic microvascular ischemic change. ORBITS: The visualized portion of the orbits demonstrate no acute abnormality. SINUSES: The visualized paranasal sinuses and mastoid air cells demonstrate no acute abnormality. SOFT TISSUES/SKULL: No acute abnormality of the visualized skull or soft tissues. Vascular calcifications are noted reflecting calcific atherosclerosis.      1. No acute intracranial abnormality. 2.  White matter hypoattenuation described is typical of microvascular ischemic disease or as sequela of dysmyelinating/demyelinating processes. 3.  Vascular calcifications are noted reflecting calcific atherosclerosis. US RENAL COMPLETE    Result Date: 9/17/2021  EXAMINATION: RETROPERITONEAL ULTRASOUND OF THE KIDNEYS AND URINARY BLADDER 9/17/2021 COMPARISON: None HISTORY: ORDERING SYSTEM PROVIDED HISTORY: MARTIN TECHNOLOGIST PROVIDED HISTORY: MARTIN FINDINGS: Kidneys: The right kidney measures 11.6 cm in length and the left kidney measures 12.1 cm in length. Kidneys demonstrate normal cortical echogenicity. No evidence of hydronephrosis or intrarenal stones. Bladder: The urinary bladder is grossly unremarkable with a prevoid volume of 12 mL. The ureteral jets were not visualized. There is cholelithiasis. Unremarkable ultrasound of the kidneys and urinary bladder. Cholelithiasis. XR CHEST PORTABLE    Result Date: 9/15/2021  EXAMINATION: ONE XRAY VIEW OF THE CHEST 9/15/2021 5:52 am COMPARISON: February 7, 2021 chest examination HISTORY: ORDERING SYSTEM PROVIDED HISTORY: rule out pneumonia TECHNOLOGIST PROVIDED HISTORY: rule out pneumonia Reason for Exam: port upright FINDINGS: Stable normal cardiopericardial silhouette Calcified granuloma right lung base. Otherwise clear lungs. Right hilar calcification. Now significant mediastinal or pleural findings     Remote granulomatous disease No acute cardiopulmonary findings             DIAGNOSIS:    MDD recurrent moderate  Rule out bipolar disorder  PTSD        RISK ASSESSMENT: low risk of suicide or harm to others      RECOMMENDATIONS-no indication for sitter admission to psychiatry. Recommend outpatient follow-up    Risk Management:  routine:  no special precautions necessary    Medications: The patient can start her prior to admission medications which include Effexor and trazodone as ordered. She had not been taking them for a few months.  She can have outpatient follow-up for refills  Discussed with the treating physician/ team about the patient and treatment plan  Reviewed the chart    Discussed with the patient risk, benefit, alternative and common side effects for the  proposed medication treatment. Patient is consenting to the treatment. Thanks for the consult. Please call me if needed. Ashlee Linda is a 49 y.o. female being evaluated by a Virtual Visit (video visit) encounter to address concerns as mentioned above. A caregiver was present in the room along with the patient. Pursuant to the emergency declaration under the [address] [place] [address], [address] [place] [address] authority and the [employer] and [employer] Act, this Virtual Visit was conducted with patient's (and/or legal guardian's) consent, to reduce the patient's risk of exposure to COVID-19 and provide necessary medical care. Services were provided through a video synchronous discussion virtually to substitute for in-person visit by provider. Patient is present at [facility]  and I am physically present at my office in [place], [place]     --Torsten Capone MD on 9/17/2021 at 2:35 PM    An electronic signature was used to authenticate this note. **This report has been created using voice recognition software. It may contain minor errors which are inherent in voice recognition technology. **

## 2021-09-17 NOTE — PROGRESS NOTES
[facility]  Internal Medicine Teaching Residency Program  Inpatient Daily Progress Note  ______________________________________________________________________________    Patient: Germain Upton  YOB: 1965   QJO:[id number]    Acct: [de-identified]     Room: 2023/2023-01  Admit date: 9/14/2021  Today's date: 09/17/21  Number of days in the hospital: 3    SUBJECTIVE   Admitting Diagnosis: DKA (diabetic ketoacidoses) ([facility] [address]  CC: Right index finger pain    Pt examined at bedside. Chart & results reviewed. No acute events overnight. Patient remained afebrile and hemodynamically stable. Plastic surgery following: flexor tendon distal to PIP debridement yesterday. DIP not able to flex the fingers survives. 2 options were discussed with the patient to salvage the finger that will have limited motion. Patient shows 2 to 3 times daily dressing changes instead of amputation. Per plastics patient can be discharged when ID feels infection is under control and follow-up with [facility]  ID following: Discontinued Zosyn and started amoxicillin. Patient will be discharged tomorrow morning on voriconazole of fluconazole according to ID of Jody, ID requested pre-auth. Patient will require to 3 weeks of antibiotics for soft tissue, tendon involvement  Labs: Cr 1.23<--1.59; Hgb 8.7<--8.0; Blood sugars: 259<--319<--379<--313<--337; Patient received Lantus 5 mg one dose and started   Increased Lantus 25 units BID starting tonight; medium dose sliding scale;  Hypoglycemia protocol      ROS:  Constitutional:  negative for chills, fevers, sweats  Respiratory:  negative for cough, dyspnea on exertion, hemoptysis, shortness of breath, wheezing  Cardiovascular:  negative for chest pain, chest pressure/discomfort, lower extremity edema, palpitations  Gastrointestinal:  negative for abdominal pain, constipation, diarrhea, nausea, vomiting  Neurological:  negative for dizziness, headache    BRIEF HISTORY     The patient is a pleasant 56 y. o. female presents with a chief complaint of right index finger pain. The patient has PMH significant for DM-2, HTN, Bipolar disorder, and polysubstance abuse. The patient reported that she had a fall yesterday and injured her right index finger and since then has had swelling and pain. She denies hitting her head or loss of consciousness. She denies urine or fecal incontinence. She denies neck pain, chest pain, shortness of breath, abdominal pain. The patient was tearful during the interview and was unable to provide a detailed history. She denies intention of self-harm or suicidal ideation. Plastic surgery was consulted and patient was taken to OR for exploration and drainage of right index finger abscess.      In the ED, the patient was afebrile Temp 98.5F, RR 18, HR 98, /123, SpO2 95%. Labs reviewed: Na 126, K 4.9, bicarb 16, BUN 11, Cr 1.08, Glucose 719, beta-hydroxy 4.42, WBC 9.4, Hgb 10.7, Plt 59, ESR 69, Blood gas: pH 7.283/ pCO2 53.7/ pO2 20.1/ HCO3 24.6Covid negative, wound culture shows gram negative rods. The patient received 10 units of insulin in the ED and was started on DKA protocol with insulin gtt and NS IVF. She was started on Vanc and Zosyn. OBJECTIVE     Vital Signs:  BP 96/73   Pulse 88   Temp 98 °F (36.7 °C) (Oral)   Resp 16   Ht 5' 6\" (1.676 m)   Wt 238 lb 9.6 oz (108.2 kg)   LMP  (LMP Unknown)   SpO2 95%   BMI 38.51 kg/m²     Temp (24hrs), Av.1 °F (36.7 °C), Min:97.5 °F (36.4 °C), Max:98.8 °F (37.1 °C)    No intake/output data recorded. Physical Exam:  Constitutional: This is a well developed, well nourished, 35-39.9 - Obesity Grade II 49y.o. year old female who is alert, oriented, cooperative and in no apparent distress. Head:normocephalic and atraumatic. EENT:  PERRLA. No conjunctival injections. Septum was midline, mucosa was without erythema, exudates or cobblestoning.   No thrush was noted. Neck: Supple without thyromegaly. No elevated JVP. Trachea was midline. Respiratory: Chest was symmetrical without dullness to percussion. Breath sounds bilaterally were clear to auscultation. There were no wheezes, rhonchi or rales. There is no intercostal retraction or use of accessory muscles. No egophony noted. Cardiovascular: Regular without murmur, clicks, gallops or rubs. Abdomen: Slightly rounded and soft without organomegaly. No rebound, rigidity or guarding was appreciated. Lymphatic: No lymphadenopathy. Musculoskeletal: Normal curvature of the spine. No gross muscle weakness. Extremities:  No lower extremity edema, ulcerations, tenderness, varicosities or erythema. Muscle size, tone and strength are normal.  No involuntary movements are noted. Skin:  Warm and dry. Good color, turgor and pigmentation. No lesions or scars.   No cyanosis or clubbing  Neurological/Psychiatric: The patient's general behavior, level of consciousness, thought content and emotional status is normal.        Medications:  Scheduled Medications:    insulin glargine  25 Units SubCUTAneous BID    fluconazole  200 mg Oral Daily    heparin (porcine)  5,000 Units SubCUTAneous 3 times per day    insulin lispro  0-12 Units SubCUTAneous TID WC    insulin lispro  0-6 Units SubCUTAneous Nightly    sodium chloride  1,000 mL IntraVENous Once    budesonide-formoterol  2 puff Inhalation BID    pantoprazole  20 mg Oral BID AC    traZODone  150 mg Oral Nightly    pregabalin  75 mg Oral BID    venlafaxine  150 mg Oral Daily with breakfast    sodium chloride flush  5-40 mL IntraVENous 2 times per day    piperacillin-tazobactam  3,375 mg IntraVENous Q8H     Continuous Infusions:    sodium chloride 100 mL/hr at 09/15/21 0849    dextrose      sodium chloride       PRN Medicationsglucose, 15 g, PRN  dextrose, 12.5 g, PRN  glucagon (rDNA), 1 mg, PRN  dextrose, 100 mL/hr, PRN  albuterol sulfate HFA, 2 puff, Q4H PRN  sodium chloride flush, 5-40 mL, PRN  sodium chloride, 25 mL, PRN  ondansetron, 4 mg, Q8H PRN   Or  ondansetron, 4 mg, Q6H PRN  polyethylene glycol, 17 g, Daily PRN  acetaminophen, 650 mg, Q6H PRN   Or  acetaminophen, 650 mg, Q6H PRN  dextrose, 12.5 g, PRN  oxyCODONE, 5 mg, Q4H PRN        Diagnostic Labs:  CBC:   Recent Labs     09/15/21  0648 09/16/21  0732 09/17/21  0629   WBC 10.4 8.0 8.6   RBC 2.79* 2.58* 2.81*   HGB 8.9* 8.0* 8.7*   HCT 28.2* 26.3* 29.4*   .1 101.9 104.6*   RDW 13.2 13.2 13.2    323 395     BMP:   Recent Labs     09/14/21  1814 09/14/21  1814 09/14/21  2353 09/14/21  2353 09/15/21  0648 09/16/21  0732 09/17/21  0629   *   < > Unable to perform testing: Specimen contaminated. < > 134* 132* 134*   K 3.8   < > PLEASE DISREGARD RESULTS. SPECIMEN CONTAMINATED. < > 3.7 4.1 4.3   CL 98   < > PLEASE DISREGARD RESULTS. SPECIMEN CONTAMINATED. < > 98 102 104   CO2 20   < > Unable to perform testing: Specimen contaminated. < > 20 21 18*   PHOS 3.0  --  PLEASE DISREGARD RESULTS. SPECIMEN CONTAMINATED.  --  3.0  --   --    BUN 11   < > PLEASE DISREGARD RESULTS. SPECIMEN CONTAMINATED. < > 11 16 12   CREATININE 1.09*   < > PLEASE DISREGARD RESULTS. SPECIMEN CONTAMINATED. < > 1.40* 1.59* 1.23*    < > = values in this interval not displayed. BNP: No results for input(s): BNP in the last 72 hours. PT/INR: No results for input(s): PROTIME, INR in the last 72 hours. APTT: No results for input(s): APTT in the last 72 hours. CARDIAC ENZYMES: No results for input(s): CKMB, CKMBINDEX, TROPONINI in the last 72 hours.     Invalid input(s): CKTOTAL;3  FASTING LIPID PANEL:  Lab Results   Component Value Date    CHOL 275 (H) 12/30/2020    HDL 80 12/30/2020    TRIG 246 (H) 12/30/2020     LIVER PROFILE:   Recent Labs     09/14/21  1511   AST 10   ALT 7   BILIDIR 0.15   BILITOT 0.37   ALKPHOS 161*      MICROBIOLOGY:   Lab Results   Component Value Date/Time    CULTURE NO GROWTH 3 DAYS 09/14/2021 03:06 PM       Imaging:    XR HAND RIGHT (MIN 3 VIEWS)    Result Date: 9/14/2021  Marked soft tissue swelling involving the 2nd finger and dorsum of the hand. No acute osseous abnormality evident. CT HEAD WO CONTRAST    Result Date: 9/15/2021  1. No acute intracranial abnormality. 2.  White matter hypoattenuation described is typical of microvascular ischemic disease or as sequela of dysmyelinating/demyelinating processes. 3.  Vascular calcifications are noted reflecting calcific atherosclerosis. XR CHEST PORTABLE    Result Date: 9/15/2021  Remote granulomatous disease No acute cardiopulmonary findings       ASSESSMENT & PLAN     ASSESSMENT / PLAN:     Principal Problem:    DKA (diabetic ketoacidoses) ([facility])  Active Problems:    Flexor tenosynovitis of finger    Bipolar disorder, mixed ([address]    Polysubstance abuse ([address]  Resolved Problems:    * No resolved hospital problems. *    Tenosynovitis Right index finger  - s/p drainage of abscess per plastics surgery  - Plastic surgery following- Flexor tendon distal to PIP was necrotic and debrided so DIP will not be able to flex if finger survives. Volar skin between PIP and IP was also necrotic. Discussed option for distal finger amputation vs. Wound care. Patient chose to continue with TID dressing changes. Okay to discharge home with dressing changes when ID feels infection is under control and follow-up in [facility]   - Received 1 dose of Vancomycin and TDAP vaccine shot  - Wound and Abscess culture: many gram positive rods and yeast light growth  - Plastic surgery following: flexor tendon distal to PIP debridement yesterday. DIP not able to flex the fingers survives. 2 options were discussed with the patient to salvage the finger that will have limited motion. Patient shows 2 to 3 times daily dressing changes instead of amputation.   Per plastics patient can be discharged when ID feels infection is under control and follow-up with [facility]  - ID following: Discontinued Zosyn and started amoxicillin. Patient will be discharged tomorrow morning on voriconazole of fluconazole according to ID of Jody, ID requested pre-auth. Patient will require to 3 weeks of antibiotics for soft tissue, tendon involvement  - CPK 71        DKA- resolved; bridged to Lantus  - Glucose 719, beta-hydroxy 4.42  - blood gas: pH 7.283/ pCO2 53.7/ pO2 20.1/ HCO3 24.6  - Patient received 10 units insulin and started on DKA protocol with insulin gtt and IVF in the ED  - On IVF NS @ 100 mL/h   - Patient is on home Lantus 20 units BID  - HbA1c 14. 2- non compliant with insulin at home  - Blood glucose: 319<--374<--313<--366; Lantus 5 units one time dose ordered this morning  - Increased Lantus 25 units BID starting tonight; medium dose sliding scale  - Hypoglycemia protocol  - Diabetic diet- patient tolerating diet well.           Polysubstance abuse  - Urine tox: positive for benzodiazepine, cocaine and opiates  - Avoid beta blockers  - Monitor for withdrawal symptoms  - Monitor BP closely  - erratic behavior overnight, patient wanted to leave AMA, refused lab draws on 09/15  - Psychiatry consulted, will follow up recommendations        HTN- controlled  - Controlled 90s-100s/70s; HR 80s  - possibly secondary to polysubstance abuse  - Monitor BP; avoid beta-blockers        Thrombocytopenia- SAILAJA 836- resolved  - secondary to infection vs. Drug abuse  - Continue to monitor        MARTIN- Cr 1.23 (0.8~0.9) -->1.40--> 1.59--> 1.23- resolving  - Continue IVF @ 100 mL/h  - Continue to monitor  - US Renal ordered, will follow up        DVT ppx: Heparin sq  GI ppx: Protonix     PT/OT: On board  Discharge Planning:  consulted, will follow up    Tom Lagunas MD  Internal Medicine Resident, PGY-1  [facility];  [place], [place]  9/17/2021, 9:49 AM

## 2021-09-17 NOTE — PROGRESS NOTES
[facility] -   Infectious diseases evaluation  admission date 9/14/2021    reason for consultation:   Hand infection    Impression :   Current:  · DKA  · DM on insulin  · SLE  · Depressed  · Right 2nd finger infected soft tissues  ·  and open ulcer w drain age x 2 weeks pTA  · Right hand cellultiis    Other:  ·   Discussion / summary of stay / plan of care   ·   Recommendations   · Avoid antibiotics that would affect the kidneys and the antibiotic was DKA  · Stop zosyn and start amoxicillin  · fluconazole 9/16 - 9/17  · Start 9/17 Vfend and ask for preauth  · DC in am w Vfend or fluconazole according to ID of the canddia  · Will require 2-3 weeks AB for soft tissue, tendon involvement  · Finger at risk for amputation - disc w pt  reconsiled w vfend    Infection Control Recommendations   · Rogers Precautions  · Contact Isolation       Antimicrobial Stewardship Recommendations   · Simplification of therapy  · Targeted therapy  · Per Kg dosing      Coordination ofOutpatient Care:   · Estimated Length of IV antimicrobials:  · Patient will need Midline / picc Catheter Insertion:   · Patient will need SNF:  · Patient will need outpatient wound care:     History of Present Illness:   Initial history:  Brandon Haney is a 49y.o.-year-old female   Presented with right second finger pain and progressive discoloration found to have an abscess over the right second finger associated with some redness over the palm in the same hand, increased pain, patient presented with DKA, area was lanced and packed, patient seen for evaluation of antibiotics  She is emotional, depressed, lost her son her daughter and her  to the short period of time.                 Interval changes  9/17/2021   Patient Vitals for the past 8 hrs:   BP Temp Temp src Pulse Resp   09/17/21 0827 -- -- -- 88 --   09/17/21 0826 -- -- -- 87 --   09/17/21 0825 96/73 98 °F (36.7 °C) Oral 87 16   09/17/21 0393 -- -- -- 87 --   09/17/21 0402 -- -- -- 89 --   09/17/21 0401 -- -- -- 88 --   09/17/21 0400 -- -- -- 89 --   09/17/21 0359 -- -- -- 88 --     Finger evaluated and sutures still in pl;ace - area macerated and deep exposure of the volar index - had still red and inflamed under the index    Pain_  Lab to ID the candida in am    Chest x-ray negative, WBC 8  Drug screen positive for cocaine and benzodiazepine  Creatinine 1.09      Summary of relevant labs:  Labs:  WBC 7  Creat normal    Micro:  BC  Wound cx GPR  OR cx yeast,candida non albicans and not dublinensis  Imaging:  R hand xray 9/14/21  Marked soft tissue swelling involving the 2nd finger and dorsum of the hand. No acute osseous abnormality evident       I have personally reviewed the past medical history, past surgical history, medications, social history, and family history, and I haveupdated the database accordingly. Allergies:   Bactrim [sulfamethoxazole-trimethoprim] and Adhesive tape     Review of Systems:     Review of Systems   Constitutional: Negative for activity change and fatigue. HENT: Negative for congestion. Eyes: Negative for photophobia, redness and visual disturbance. Respiratory: Negative for apnea, cough and shortness of breath. Cardiovascular: Negative for chest pain. Gastrointestinal: Negative for abdominal distention. Endocrine: Negative for polyphagia and polyuria. Genitourinary: Negative for dysuria, flank pain, frequency and urgency. Musculoskeletal: Positive for arthralgias. Skin: Positive for color change and wound. Allergic/Immunologic: Negative for immunocompromised state. Neurological: Negative for dizziness, seizures, light-headedness and numbness. Hematological: Negative for adenopathy. Psychiatric/Behavioral: Negative for agitation. Physical Examination :       Physical Exam  Constitutional:       Appearance: Normal appearance. She is not ill-appearing, toxic-appearing or diaphoretic. HENT:      Head: Normocephalic and atraumatic. Nose: Nose normal.      Mouth/Throat:      Mouth: Mucous membranes are moist.   Eyes:      General: No scleral icterus. Conjunctiva/sclera: Conjunctivae normal.      Pupils: Pupils are equal, round, and reactive to light. Cardiovascular:      Rate and Rhythm: Normal rate and regular rhythm. Heart sounds: Normal heart sounds. No murmur heard. Pulmonary:      Effort: No respiratory distress. Breath sounds: Normal breath sounds. No rhonchi. Abdominal:      General: There is no distension. Palpations: Abdomen is soft. Tenderness: There is no abdominal tenderness. Genitourinary:     Comments: No cullen  Musculoskeletal:         General: Deformity present. No swelling, tenderness or signs of injury. Cervical back: Neck supple. No rigidity or tenderness. Skin:     General: Skin is dry. Coloration: Skin is not jaundiced. Findings: Erythema and lesion present. Neurological:      Mental Status: She is alert and oriented to person, place, and time. Cranial Nerves: No cranial nerve deficit. Psychiatric:         Mood and Affect: Mood normal.         Thought Content:  Thought content normal.         Past Medical History:     Past Medical History:   Diagnosis Date    Acid reflux 5/29/2017    Acute cystitis with hematuria 10/11/2016    Acute non-recurrent maxillary sinusitis 11/28/2017    Asthma     Bipolar 1 disorder ([address] 1/4/2018    Bipolar disorder, mixed ([address]     BMI 34.0-34.9,adult 11/28/2017    Cannabis use disorder, severe, dependence ([address] 12/19/2017    Cerebrovascular accident (CVA) ([address] 6/14/2017    Chest pain 11/5/2016    Chronic renal insufficiency     Cocaine abuse ([address] 1/5/2018    COVID-19 virus RNA test result unknown     DDD (degenerative disc disease), cervical     Diabetes mellitus ([address]     Dizziness 6/13/2017    Fibromyalgia     History of stroke 9/9/2017    Homicidal ideation 11/6/2017    Hyperglycemia     Hypertension     Hypotension 1/18/2019    IDDM (insulin dependent diabetes mellitus) 12/21/2015    Lupus ([facility] [address]     Migraine     Neuropathy     Neuropathy     Polysubstance abuse ([address] 9/17/2017    Post traumatic stress disorder (PTSD)     Posttraumatic stress disorder 5/29/2017    Recurrent depression ([address] 5/29/2017    Recurrent major depressive disorder, in partial remission ([address] 11/28/2017    Screening mammogram, encounter for 11/28/2017    Severe recurrent major depression with psychotic features ([address] 12/19/2017    Severe recurrent major depression without psychotic features ([address] 12/19/2017    Stroke (cerebrum) ([address] 6/14/2017    Stroke ([facility] [address]     per chart notes    Suicidal ideation     Suicidal intent 3/10/2017    Vitamin D deficiency 11/28/2017    White matter changes 6/13/2017       Past Surgical  History:     Past Surgical History:   Procedure Laterality Date    ABDOMEN SURGERY      drain tube    ABSCESS DRAINAGE      right buttock    CATARACT REMOVAL WITH IMPLANT Bilateral     CHEST TUBE INSERTION      FINGER AMPUTATION Left 03/13/2021    LEFT RING FINER AMPUTATION performed by Martin Lai MD at [address] Right 09/15/2021     I&D INDEX FINGER     HAND SURGERY Right 09/14/2021    I&D INDEX FINGER performed by Martin Lai MD at [address] [place]  Right 9/15/2021    I&D INDEX FINGER performed by Martin Lai MD at [facility] LAS Bilateral        Medications:      insulin glargine  25 Units SubCUTAneous BID    insulin glargine  5 Units SubCUTAneous Once    fluconazole  200 mg Oral Daily    heparin (porcine)  5,000 Units SubCUTAneous 3 times per day    insulin lispro  0-12 Units SubCUTAneous TID WC    insulin lispro  0-6 Units SubCUTAneous Nightly    sodium chloride  1,000 mL IntraVENous Once    budesonide-formoterol  2 puff Inhalation BID    pantoprazole  20 mg Oral BID AC    traZODone  150 mg Oral Nightly    pregabalin  75 mg Oral BID    venlafaxine  150 mg Oral Daily with breakfast    sodium chloride flush  5-40 mL IntraVENous 2 times per day       Social History:     Social History     Socioeconomic History    Marital status: Legally      Spouse name: Not on file    Number of children: Not on file    Years of education: Not on file    Highest education level: Not on file   Occupational History    Not on file   Tobacco Use    Smoking status: Never Smoker    Smokeless tobacco: Never Used   Vaping Use    Vaping Use: Never used   Substance and Sexual Activity    Alcohol use: Not Currently    Drug use: Yes     Types: Marijuana, Cocaine     Comment: + Cocaine 2/2021 also, see Care Everywhere UDS    Sexual activity: Not Currently     Partners: Male   Other Topics Concern    Not on file   Social History Narrative    Not on file     Social Determinants of Health     Financial Resource Strain: High Risk    Difficulty of Paying Living Expenses: Hard   Food Insecurity: Food Insecurity Present    Worried About Running Out of Food in the Last Year: Often true    Kelin of Food in the Last Year: Often true   Transportation Needs: Unmet Transportation Needs    Lack of Transportation (Medical):  Yes    Lack of Transportation (Non-Medical): Yes   Physical Activity: Inactive    Days of Exercise per Week: 0 days    Minutes of Exercise per Session: 0 min   Stress: Stress Concern Present    Feeling of Stress : Rather much   Social Connections:     Frequency of Communication with Friends and Family:     Frequency of Social Gatherings with Friends and Family:     Attends [employer] Services:     Active Member of Clubs or Organizations:     Attends Club or Organization Meetings:     Marital Status:    Intimate Partner Violence:     Fear of Current or Ex-Partner:     Emotionally Abused:     Physically Abused:     Sexually Abused:        Family History:     Family History   Problem Relation Age of Onset    Diabetes Mother     Stroke Mother     Diabetes Father     Diabetes Sister     Diabetes Brother     Other Son         GSW    No Known Problems Sister       Medical Decision Making:   I have independently reviewed/ordered the following labs:    CBC with Differential:   Recent Labs     09/16/21  0732 09/17/21  0629   WBC 8.0 8.6   HGB 8.0* 8.7*   HCT 26.3* 29.4*    395   LYMPHOPCT 19* 27   MONOPCT 11 12     BMP:  Recent Labs     09/14/21  2353 09/14/21  2353 09/15/21  0648 09/15/21  0648 09/16/21  0732 09/17/21  0629   NA Unable to perform testing: Specimen contaminated. < > 134*   < > 132* 134*   K PLEASE DISREGARD RESULTS. SPECIMEN CONTAMINATED. < > 3.7   < > 4.1 4.3   CL PLEASE DISREGARD RESULTS. SPECIMEN CONTAMINATED. < > 98   < > 102 104   CO2 Unable to perform testing: Specimen contaminated. < > 20   < > 21 18*   BUN PLEASE DISREGARD RESULTS. SPECIMEN CONTAMINATED. < > 11   < > 16 12   CREATININE PLEASE DISREGARD RESULTS. SPECIMEN CONTAMINATED. < > 1.40*   < > 1.59* 1.23*   MG PLEASE DISREGARD RESULTS. SPECIMEN CONTAMINATED.  --  1.8  --   --   --     < > = values in this interval not displayed. Hepatic Function Panel:   Recent Labs     09/14/21  1511   PROT 7.3   LABALBU 3.7   BILIDIR 0.15   IBILI 0.22   BILITOT 0.37   ALKPHOS 161*   ALT 7   AST 10     No results for input(s): RPR in the last 72 hours. No results for input(s): HIV in the last 72 hours. No results for input(s): BC in the last 72 hours. Lab Results   Component Value Date    CREATININE 1.23 09/17/2021    GLUCOSE 319 09/17/2021       Detailed results: Thank you for allowing us to participate in the care of this patient. Please call with questions.     This note is created with the assistance of a speech recognition program.  While intending to generate adocument that actually reflects the content of the visit, the document can still have some errors including those of syntax and sound a like substitutions which may escape proof reading. It such instances, actual meaningcan be extrapolated by contextual diversion.     Racquel Tierney MD  Office: [phone]  Perfect serve / office [phone]

## 2021-09-17 NOTE — OP NOTE
[facility]                                OPERATIVE REPORT    PATIENT NAME: Ayala Sterling                   :        1965  MED REC NO:   [id number]                             ROOM:         ACCOUNT NO:   [de-identified]                           ADMIT DATE: 2021  PROVIDER:     Nan Alejandro    DATE OF PROCEDURE:  09/15/2021    ATTENDING PHYSICIAN:  Mattie Nuñez DO    SURGEON:  Nan Alejandro MD    PREOPERATIVE DIAGNOSIS:  Cellulitis and abscess of right index finger. POSTOPERATIVE DIAGNOSIS:  Cellulitis and abscess of right index finger. PROCEDURE:  Re-evaluation of wound with incision and drainage of right  index finger. ANESTHESIA:  General.    INDICATIONS:  This patient is a 70-year-old female who had I and D done  yesterday, much necrotic tissue, was felt unable to close at that time,  therefore, dressed with moist dressings for 24 hours to check for  viability of tissues. She presents back at this time now for that  second procedure. The procedure, the risks, and benefits were discussed  with her. All questions were answered to her satisfaction. Consent was  signed. NARRATIVE SUMMARY:  The patient was brought to the operating suite,  placed under general anesthetic in supine position. The right hand was  then prepped and draped in the usual sterile fashion after removing the  dressing. Examining the wound, the tendon was now clearly necrotic,  looked questionable yesterday. So, the flexor tendon from the PIP distal was  debrided away. The profundus and the sheath, other areas of necrotic  fat were further debrided away until only viable appearing tissue  remained. The tip and pad was pink with good capillary refill and  bleeding as were all the wound edges. There was absence of the skin  over the distal middle segment.   At this point, understanding the  patient was hoping to keep her digit, it was felt best to dress and do  dressing changes and see if we can get a granulated wound that can be  grafted or possibly flapped for closure. Therefore, the flaps  proximally were approximated loosely with 4-0 black nylon sutures. This  left an approximately 1.5 x 2 cm defect over the distal middle segment,  which was dressed with normal saline gauze and wrapped. Postoperative  care to be done with t.i.d. saline dressing changes, allow the wound to  continue cleaning up and hopefully granulate. Closure after infection   Cleared. The patient tolerated the procedure well. Blood loss minimal.    Specimens none. Complications none. The patient was transferred   to Recovery in stable condition.         Iglesia Bruce    D: 09/17/2021 12:07:34       T: 09/17/2021 12:11:05     [email or web address]  Job#: [id number]     Doc#: [id number]    CC:

## 2021-09-17 NOTE — PROGRESS NOTES
Plastics - [place]    I discussed surgical findings with patient. Flexor tendon distal to PIP was necrotic and debrided so DIP will not be able to flex if finger survives. Volar skin between PIP and DIP was also necrotic. Tip of finger is pink and has good capillary refill. I discussed with her 2 options. We can try to salvage her finger though it will have limited motion. This will involve saline moist dressing changes of wound until we have a granulated wound suitable for skin graft. Or we can amputate the distal finger and close it. She wants to try to save the finger so will continue with TID dressing changes. Can be discharged with home dressing changes when I.D. feels infection is under control and Follow-up in [place]..

## 2021-09-18 LAB
GLUCOSE BLD-MCNC: 121 MG/DL (ref 65–105)
GLUCOSE BLD-MCNC: 147 MG/DL (ref 65–105)
GLUCOSE BLD-MCNC: 155 MG/DL (ref 65–105)

## 2021-09-18 PROCEDURE — 99232 SBSQ HOSP IP/OBS MODERATE 35: CPT | Performed by: INTERNAL MEDICINE

## 2021-09-18 PROCEDURE — 82947 ASSAY GLUCOSE BLOOD QUANT: CPT

## 2021-09-18 PROCEDURE — 6370000000 HC RX 637 (ALT 250 FOR IP): Performed by: STUDENT IN AN ORGANIZED HEALTH CARE EDUCATION/TRAINING PROGRAM

## 2021-09-18 PROCEDURE — 94640 AIRWAY INHALATION TREATMENT: CPT

## 2021-09-18 PROCEDURE — 6370000000 HC RX 637 (ALT 250 FOR IP): Performed by: PLASTIC SURGERY

## 2021-09-18 PROCEDURE — 6370000000 HC RX 637 (ALT 250 FOR IP): Performed by: INTERNAL MEDICINE

## 2021-09-18 PROCEDURE — 6360000002 HC RX W HCPCS: Performed by: STUDENT IN AN ORGANIZED HEALTH CARE EDUCATION/TRAINING PROGRAM

## 2021-09-18 PROCEDURE — 2580000003 HC RX 258: Performed by: PLASTIC SURGERY

## 2021-09-18 PROCEDURE — APPSS30 APP SPLIT SHARED TIME 16-30 MINUTES: Performed by: NURSE PRACTITIONER

## 2021-09-18 PROCEDURE — 1200000000 HC SEMI PRIVATE

## 2021-09-18 RX ORDER — OXYCODONE HYDROCHLORIDE 5 MG/1
5 TABLET ORAL EVERY 6 HOURS PRN
Qty: 8 TABLET | Refills: 0 | Status: SHIPPED | OUTPATIENT
Start: 2021-09-18 | End: 2021-09-20

## 2021-09-18 RX ORDER — FENTANYL CITRATE 50 UG/ML
25 INJECTION, SOLUTION INTRAMUSCULAR; INTRAVENOUS ONCE
Status: COMPLETED | OUTPATIENT
Start: 2021-09-18 | End: 2021-09-18

## 2021-09-18 RX ADMIN — INSULIN GLARGINE 25 UNITS: 100 INJECTION, SOLUTION SUBCUTANEOUS at 21:07

## 2021-09-18 RX ADMIN — ACETAMINOPHEN 650 MG: 325 TABLET ORAL at 02:27

## 2021-09-18 RX ADMIN — OXYCODONE HYDROCHLORIDE 5 MG: 5 TABLET ORAL at 10:05

## 2021-09-18 RX ADMIN — FENTANYL CITRATE 25 MCG: 50 INJECTION, SOLUTION INTRAMUSCULAR; INTRAVENOUS at 23:32

## 2021-09-18 RX ADMIN — PANTOPRAZOLE SODIUM 20 MG: 20 TABLET, DELAYED RELEASE ORAL at 08:38

## 2021-09-18 RX ADMIN — INSULIN LISPRO 2 UNITS: 100 INJECTION, SOLUTION INTRAVENOUS; SUBCUTANEOUS at 21:05

## 2021-09-18 RX ADMIN — OXYCODONE HYDROCHLORIDE 5 MG: 5 TABLET ORAL at 15:28

## 2021-09-18 RX ADMIN — SODIUM CHLORIDE, PRESERVATIVE FREE 10 ML: 5 INJECTION INTRAVENOUS at 20:59

## 2021-09-18 RX ADMIN — OXYCODONE HYDROCHLORIDE 5 MG: 5 TABLET ORAL at 20:55

## 2021-09-18 RX ADMIN — SODIUM CHLORIDE, PRESERVATIVE FREE 10 ML: 5 INJECTION INTRAVENOUS at 08:49

## 2021-09-18 RX ADMIN — BUDESONIDE AND FORMOTEROL FUMARATE DIHYDRATE 2 PUFF: 80; 4.5 AEROSOL RESPIRATORY (INHALATION) at 22:08

## 2021-09-18 RX ADMIN — AMOXICILLIN 500 MG: 500 CAPSULE ORAL at 21:00

## 2021-09-18 RX ADMIN — TRAZODONE HYDROCHLORIDE 150 MG: 100 TABLET ORAL at 20:58

## 2021-09-18 RX ADMIN — VORICONAZOLE 200 MG: 200 TABLET ORAL at 20:59

## 2021-09-18 RX ADMIN — PREGABALIN 75 MG: 75 CAPSULE ORAL at 08:38

## 2021-09-18 RX ADMIN — OXYCODONE HYDROCHLORIDE 5 MG: 5 TABLET ORAL at 06:30

## 2021-09-18 RX ADMIN — AMOXICILLIN 500 MG: 500 CAPSULE ORAL at 06:59

## 2021-09-18 RX ADMIN — VORICONAZOLE 200 MG: 200 TABLET ORAL at 08:38

## 2021-09-18 RX ADMIN — VENLAFAXINE HYDROCHLORIDE 150 MG: 150 CAPSULE, EXTENDED RELEASE ORAL at 08:38

## 2021-09-18 RX ADMIN — INSULIN LISPRO 4 UNITS: 100 INJECTION, SOLUTION INTRAVENOUS; SUBCUTANEOUS at 17:51

## 2021-09-18 RX ADMIN — INSULIN GLARGINE 25 UNITS: 100 INJECTION, SOLUTION SUBCUTANEOUS at 08:38

## 2021-09-18 RX ADMIN — PREGABALIN 75 MG: 75 CAPSULE ORAL at 20:59

## 2021-09-18 RX ADMIN — OXYCODONE HYDROCHLORIDE 5 MG: 5 TABLET ORAL at 02:28

## 2021-09-18 RX ADMIN — AMOXICILLIN 500 MG: 500 CAPSULE ORAL at 15:27

## 2021-09-18 RX ADMIN — PANTOPRAZOLE SODIUM 20 MG: 20 TABLET, DELAYED RELEASE ORAL at 16:49

## 2021-09-18 RX ADMIN — BUDESONIDE AND FORMOTEROL FUMARATE DIHYDRATE 2 PUFF: 80; 4.5 AEROSOL RESPIRATORY (INHALATION) at 10:24

## 2021-09-18 ASSESSMENT — PAIN SCALES - GENERAL
PAINLEVEL_OUTOF10: 8
PAINLEVEL_OUTOF10: 6
PAINLEVEL_OUTOF10: 10
PAINLEVEL_OUTOF10: 10
PAINLEVEL_OUTOF10: 8
PAINLEVEL_OUTOF10: 4
PAINLEVEL_OUTOF10: 10
PAINLEVEL_OUTOF10: 6
PAINLEVEL_OUTOF10: 10
PAINLEVEL_OUTOF10: 10
PAINLEVEL_OUTOF10: 3

## 2021-09-18 ASSESSMENT — ENCOUNTER SYMPTOMS
COUGH: 0
PHOTOPHOBIA: 0
APNEA: 0
ABDOMINAL DISTENTION: 0
EYE REDNESS: 0
SHORTNESS OF BREATH: 0
COLOR CHANGE: 1

## 2021-09-18 ASSESSMENT — PAIN DESCRIPTION - ORIENTATION
ORIENTATION: RIGHT
ORIENTATION: RIGHT

## 2021-09-18 ASSESSMENT — PAIN DESCRIPTION - ONSET: ONSET: ON-GOING

## 2021-09-18 ASSESSMENT — PAIN DESCRIPTION - PAIN TYPE
TYPE: CHRONIC PAIN
TYPE: ACUTE PAIN

## 2021-09-18 ASSESSMENT — PAIN DESCRIPTION - LOCATION
LOCATION: HAND
LOCATION: HAND

## 2021-09-18 NOTE — PROGRESS NOTES
[facility]  Occupational Therapy Not Seen Note    DATE: 2021    NAME: Kay Cranker  MRN: [id number]   : 1965      Patient not seen this date for Occupational Therapy due to:    Patient Declined: Pt reports 10/10 pain in right hand and is unagreeable to therapy evaluation at this time.     Next Scheduled Treatment: 21    Electronically signed by VARSHA Mohan OTR/L on 2021 at 10:44 AM

## 2021-09-18 NOTE — DISCHARGE INSTR - DIET
 Good nutrition is important when healing from an illness, injury, or surgery. Follow any nutrition recommendations given to you during your hospital stay.  If you were given an oral nutrition supplement while in the hospital, continue to take this supplement at home. You can take it with meals, in-between meals, and/or before bedtime. These supplements can be purchased at most local grocery stores, pharmacies, and chain super-stores.  If you have any questions about your diet or nutrition, call the hospital and ask for the dietitian. Learning About Meal Planning for Diabetes  Why plan your meals? Meal planning can be a key part of managing diabetes. Planning meals and snacks with the right balance of carbohydrate, protein, and fat can help you keep your blood sugar at the target level you set with your doctor. You don't have to eat special foods. You can eat what your family eats, including sweets once in a while. But you do have to pay attention to how often you eat and how much you eat of certain foods. You may want to work with a dietitian or a certified diabetes educator. He or she can give you tips and meal ideas and can answer your questions about meal planning. This health professional can also help you reach a healthy weight if that is one of your goals. What plan is right for you? Your dietitian or diabetes educator may suggest that you start with the plate format or carbohydrate counting. The plate format  The plate format is a simple way to help you manage how you eat. You plan meals by learning how much space each food should take on a plate. Using the plate format helps you spread carbohydrate throughout the day. It can make it easier to keep your blood sugar level within your target range. It also helps you see if you're eating healthy portion sizes. To use the plate format, you put non-starchy vegetables on half your plate.  Add meat or meat substitutes on one-quarter of the plate. Put a grain or starchy vegetable (such as brown rice or a potato) on the final quarter of the plate. You can add a small piece of fruit and some low-fat or fat-free milk or yogurt, depending on your carbohydrate goal for each meal.  Here are some tips for using the plate format:  · Make sure that you are not using an oversized plate. A 9-inch plate is best. Many restaurants use larger plates. · Get used to using the plate format at home. Then you can use it when you eat out. · Write down your questions about using the plate format. Talk to your doctor, a dietitian, or a diabetes educator about your concerns. Carbohydrate counting  With carbohydrate counting, you plan meals based on the amount of carbohydrate in each food. Carbohydrate raises blood sugar higher and more quickly than any other nutrient. It is found in desserts, breads and cereals, and fruit. It's also found in starchy vegetables such as potatoes and corn, grains such as rice and pasta, and milk and yogurt. Spreading carbohydrate throughout the day helps keep your blood sugar levels within your target range. Your daily amount depends on several things, including your weight, how active you are, which diabetes medicines you take, and what your goals are for your blood sugar levels. A registered dietitian or diabetes educator can help you plan how much carbohydrate to include in each meal and snack. A guideline for your daily amount of carbohydrate is:  · 45 to 60 grams at each meal. That's about the same as 3 to 4 carbohydrate servings. · 15 to 20 grams at each snack. That's about the same as 1 carbohydrate serving. The Nutrition Facts label on packaged foods tells you how much carbohydrate is in a serving of the food. First, look at the serving size on the food label. Is that the amount you eat in a serving? All of the nutrition information on a food label is based on that serving size.  So if you eat more or less than that, you'll need to adjust the other numbers. Total carbohydrate is the next thing you need to look for on the label. If you count carbohydrate servings, one serving of carbohydrate is 15 grams. For foods that don't come with labels, such as fresh fruits and vegetables, you'll need a guide that lists carbohydrate in these foods. Ask your doctor, dietitian, or diabetes educator about books or other nutrition guides you can use. If you take insulin, you need to know how many grams of carbohydrate are in a meal. This lets you know how much rapid-acting insulin to take before you eat. If you use an insulin pump, you get a constant rate of insulin during the day. So the pump must be programmed at meals to give you extra insulin to cover the rise in blood sugar after meals. When you know how much carbohydrate you will eat, you can take the right amount of insulin. Or, if you always use the same amount of insulin, you need to make sure that you eat the same amount of carbohydrate at meals. If you need more help to understand carbohydrate counting and food labels, ask your doctor, dietitian, or diabetes educator. How can you plan healthy meals? Here are some tips to get started:  · Plan your meals a week at a time. Don't forget to include snacks too. · Use cookbooks or online recipes to plan several main meals. Plan some quick meals for busy nights. You also can double some recipes that freeze well. Then you can save half for other busy nights when you don't have time to cook. · Make sure you have the ingredients you need for your recipes. If you're running low on basic items, put these items on your shopping list too. · List foods that you use to make breakfasts, lunches, and snacks. List plenty of fruits and vegetables. · Post this list on the refrigerator. Add to it as you think of more things you need. · Take the list to the store to do your weekly shopping. Follow-up care is a key part of your treatment and safety.  Be sure to

## 2021-09-18 NOTE — DISCHARGE INSTR - COC
hematuria N30.01    Hyperglycemia R73.9    Suicidal ideation R45.851    Arthritis M19.90    Chronic back pain M54.9, G89.29    Acid reflux K21.9    History of stroke Z86.73    Polysubstance abuse ([facility]) F19.10    Bipolar 1 disorder ([facility]) F31.9    Cocaine abuse ([facility]) F14.10    Chest pain R07.9    Acute non-recurrent maxillary sinusitis J01.00    Acute respiratory failure with hypercapnia ([facility]) J96.02    Alcohol use disorder, severe, dependence ([facility]) F10.20    Asthma exacerbation attacks J45. 901    Bilateral edema of lower extremity R60.0    Bipolar 1 disorder, depressed, severe ([facility]) F31.4    BMI 34.0-34.9,adult Z68.34    Cannabis use disorder, severe, dependence ([facility]) F12.20    Cocaine use disorder, severe, dependence ([facility]) F14.20    Dizziness R42    Dyslipidemia E78.5    Family history of colon cancer Z80.0    History of lupus [id number]    Homicidal ideation R45.850    Hypotension I95.9    Neuropathy G62.9    Normocytic anemia D64.9    Recurrent depression ([facility]) F33.9    Recurrent major depressive disorder, in partial remission ([facility]) F33.41    Severe recurrent major depression with psychotic features ([facility]) F33.3    Severe recurrent major depression without psychotic features ([facility]) F33.2    Upper GI bleed K92.2    Vitamin D deficiency E55.9    White matter changes [id number]    Gastroesophageal reflux disease K21.9    Stroke (cerebrum) ([facility]) I63.9    Rib lesion M89.9    Diabetes mellitus type 2 in obese ([facility]) E11.69, E66.9    YAEL (generalized anxiety disorder) F41.1    Posttraumatic stress disorder F43.10    Suicidal intent R45.851    Leg weakness, bilateral R29.898    Poorly controlled diabetes mellitus ([facility]) E11.65    Acute kidney injury ([facility]) N17.9    Felon of finger L03.019    Osteomyelitis ([facility]) M86.9    Recurrent falls R29.6    Seasonal allergic rhinitis J30.2    Fibromyalgia M79.7    Tenosynovitis of finger M65.9    DKA (diabetic ketoacidoses) ([facility]) E11.10 Isolation/Infection:   Isolation            Contact          Patient Infection Status       Infection Onset Added Last Indicated Last Indicated By Review Planned Expiration Resolved Resolved By    MRSA 02/24/21 02/27/21 02/24/21 Culture, Anaerobic and Aerobic        Finger 2/2021    Resolved    COVID-19 Rule Out 03/13/21 03/13/21 03/13/21 COVID-19, Rapid (Ordered)   03/13/21 Rule-Out Test Resulted            Nurse Assessment:  Last Vital Signs: /80   Pulse 76   Temp 97.5 °F (36.4 °C) (Oral)   Resp 14   Ht 5' 6\" (1.676 m)   Wt 236 lb 12.8 oz (107.4 kg)   LMP  (LMP Unknown)   SpO2 97%   BMI 38.22 kg/m²     Last documented pain score (0-10 scale): Pain Level: 10  Last Weight:   Wt Readings from Last 1 Encounters:   09/21/21 236 lb 12.8 oz (107.4 kg)     Mental Status:  oriented and alert    IV Access:  - None    Nursing Mobility/ADLs:  Walking   Independent  Transfer  Independent  Bathing  Independent  Dressing  Independent  Toileting  Independent  Feeding  [address] [facility]   whole    Wound Care Documentation and Therapy:        Elimination:  Continence:   · Bowel: Yes  · Bladder: Yes  Urinary Catheter: None   Colostomy/Ileostomy/Ileal Conduit: No       Date of Last BM: 09/23/2021    Intake/Output Summary (Last 24 hours) at 9/24/2021 1559  Last data filed at 9/24/2021 0917  Gross per 24 hour   Intake 140 ml   Output --   Net 140 ml     I/O last 3 completed shifts: In: 140 [P.O.:140]  Out: -     Safety Concerns:     History of Falls (last 30 days) and At Risk for Falls    Impairments/Disabilities:      None    Nutrition Therapy:  Current Nutrition Therapy:   - Oral Diet:  Carb Control 3 carbs/meal (1500kcals/day)    Routes of Feeding: Oral  Liquids:  Thin Liquids  Daily Fluid Restriction: no  Last Modified Barium Swallow with Video (Video Swallowing Test): not done    Treatments at the Time of Hospital Discharge:   Respiratory Treatments: inhaler  Oxygen Therapy:  is not on home oxygen therapy. Ventilator:    - No ventilator support    Rehab Therapies: Physical Therapy and Occupational Therapy  Weight Bearing Status/Restrictions: No weight bearing restirctions  Other Medical Equipment (for information only, NOT a DME order):  none  Other Treatments: dressing changes- Normal Saline gauze damp to damp right index finger change BID    Patient's personal belongings (please select all that are sent with patient):  clothes    RN SIGNATURE:  Electronically signed by Enrique Tripp RN on 9/24/21 at 5:30 PM EDT    CASE MANAGEMENT/SOCIAL WORK SECTION    Inpatient Status Date: 9-    Readmission Risk Assessment Score:  Readmission Risk              Risk of Unplanned Readmission:  33           Discharging to Facility/ Agency   · [address]   · Address:  · Phone:  · Fax:    Dialysis Facility (if applicable)   · Name:  · Address:  · Dialysis Schedule:  · Phone:  · Fax:    / signature: Electronically signed by Danial Hirsch RN on 9/24/21 at 3:24 PM EDT    PHYSICIAN SECTION    Prognosis: Fair    Condition at Discharge: Stable    Rehab Potential (if transferring to Rehab): Fair    Recommended Labs or Other Treatments After Discharge: Hepatic Function Panel 2x a week for 3 weeks   dressing change Two times a day  Follow up with infectious [facility] for right second digit cellulitis. Test your blood sugar regularly to keep in target range. Physician Certification: I certify the above information and transfer of Mateus Landeros  is necessary for the continuing treatment of the diagnosis listed and that she requires SNF for less 30 days.      Update Admission H&P: No change in H&P    PHYSICIAN SIGNATURE:  Electronically signed by Jeyson Hamilton MD on 9/24/21 at 3:27 PM EDT

## 2021-09-18 NOTE — PROGRESS NOTES
[facility]  Internal Medicine Teaching Residency Program  Inpatient Daily Progress Note  ______________________________________________________________________________    Patient: Teressa Hoover  YOB: 1965   AKY:[id number]    Acct: [de-identified]     Room: 2023/2023-01  Admit date: 9/14/2021  Today's date: 09/18/21  Number of days in the hospital: 4    SUBJECTIVE   Admitting Diagnosis: DKA (diabetic ketoacidoses) ([facility] [address]     CC: Right index finger pain    Pt examined at bedside. Chart & results reviewed. No acute events overnight. Patient remained afebrile and hemodynamically stable. /58, HR 90s-100s  - Patient refused labs this morning. She reported improvement in right hand pain- still complains of some pain. Denies shortness of breath, chest pain, nausea, abdominal pain. - Wound culture: gram positive rods, yeast- not candida albicans or candida dubliniensis light growth  - ID following: patient's abx changed to PO. On Amoxicillin 500 mg TID and Voriconazole 200 mg BID.   - Psychiatry consulted: low risk of suicide and harm to others, no indicate for sitter admission to psychiatry. Recommend outpatient follow-up   - Blood glucose: 121<--221<--247<--259; Continue Lantus 25 units BID; medium dose sliding scale  - Hypoglycemia protocol  - Diabetic diet- patient tolerating diet well. ROS:  Constitutional:  negative for chills, fevers, sweats  Respiratory:  negative for cough, dyspnea on exertion, hemoptysis, shortness of breath, wheezing  Cardiovascular:  negative for chest pain, chest pressure/discomfort, lower extremity edema, palpitations  Gastrointestinal:  negative for abdominal pain, constipation, diarrhea, nausea, vomiting  Neurological:  negative for dizziness, headache    BRIEF HISTORY     The patient is a pleasant 56 y. o. female presents with a chief complaint of right index finger pain.  The patient has PMH significant for DM-2, HTN, to auscultation. There were no wheezes, rhonchi or rales. There is no intercostal retraction or use of accessory muscles. No egophony noted. Cardiovascular: Regular without murmur, clicks, gallops or rubs. Abdomen: Slightly rounded and soft without organomegaly. No rebound, rigidity or guarding was appreciated. Lymphatic: No lymphadenopathy. Musculoskeletal:  No gross muscle weakness. Extremities:  No lower extremity edema, ulcerations, tenderness, varicosities or erythema. Muscle size, tone and strength are normal.  No involuntary movements are noted. Skin:  Warm and dry. Good color, turgor and pigmentation. No lesions or scars.   No cyanosis or clubbing  Neurological/Psychiatric: The patient's general behavior, level of consciousness, thought content and emotional status is normal.        Medications:  Scheduled Medications:    insulin glargine  25 Units SubCUTAneous BID    voriconazole  200 mg Oral 2 times per day    amoxicillin  500 mg Oral 3 times per day    heparin (porcine)  5,000 Units SubCUTAneous 3 times per day    insulin lispro  0-12 Units SubCUTAneous TID WC    insulin lispro  0-6 Units SubCUTAneous Nightly    sodium chloride  1,000 mL IntraVENous Once    budesonide-formoterol  2 puff Inhalation BID    pantoprazole  20 mg Oral BID AC    traZODone  150 mg Oral Nightly    pregabalin  75 mg Oral BID    venlafaxine  150 mg Oral Daily with breakfast    sodium chloride flush  5-40 mL IntraVENous 2 times per day     Continuous Infusions:    sodium chloride 100 mL/hr at 09/15/21 0849    dextrose      sodium chloride       PRN Medicationsglucose, 15 g, PRN  dextrose, 12.5 g, PRN  glucagon (rDNA), 1 mg, PRN  dextrose, 100 mL/hr, PRN  albuterol sulfate HFA, 2 puff, Q4H PRN  sodium chloride flush, 5-40 mL, PRN  sodium chloride, 25 mL, PRN  ondansetron, 4 mg, Q8H PRN   Or  ondansetron, 4 mg, Q6H PRN  polyethylene glycol, 17 g, Daily PRN  acetaminophen, 650 mg, Q6H PRN Or  acetaminophen, 650 mg, Q6H PRN  dextrose, 12.5 g, PRN  oxyCODONE, 5 mg, Q4H PRN        Diagnostic Labs:  CBC:   Recent Labs     09/16/21  0732 09/17/21  0629   WBC 8.0 8.6   RBC 2.58* 2.81*   HGB 8.0* 8.7*   HCT 26.3* 29.4*   .9 104.6*   RDW 13.2 13.2    395     BMP:   Recent Labs     09/16/21  0732 09/17/21  0629   * 134*   K 4.1 4.3    104   CO2 21 18*   BUN 16 12   CREATININE 1.59* 1.23*     BNP: No results for input(s): BNP in the last 72 hours. PT/INR: No results for input(s): PROTIME, INR in the last 72 hours. APTT: No results for input(s): APTT in the last 72 hours. CARDIAC ENZYMES: No results for input(s): CKMB, CKMBINDEX, TROPONINI in the last 72 hours. Invalid input(s): CKTOTAL;3  FASTING LIPID PANEL:  Lab Results   Component Value Date    CHOL 275 (H) 12/30/2020    HDL 80 12/30/2020    TRIG 246 (H) 12/30/2020     LIVER PROFILE: No results for input(s): AST, ALT, ALB, BILIDIR, BILITOT, ALKPHOS in the last 72 hours. MICROBIOLOGY:   Lab Results   Component Value Date/Time    CULTURE NO GROWTH 4 DAYS 09/14/2021 03:06 PM       Imaging:    XR HAND RIGHT (MIN 3 VIEWS)    Result Date: 9/14/2021  Marked soft tissue swelling involving the 2nd finger and dorsum of the hand. No acute osseous abnormality evident. CT HEAD WO CONTRAST    Result Date: 9/15/2021  1. No acute intracranial abnormality. 2.  White matter hypoattenuation described is typical of microvascular ischemic disease or as sequela of dysmyelinating/demyelinating processes. 3.  Vascular calcifications are noted reflecting calcific atherosclerosis. US RENAL COMPLETE    Result Date: 9/17/2021  Unremarkable ultrasound of the kidneys and urinary bladder. Cholelithiasis.      XR CHEST PORTABLE    Result Date: 9/15/2021  Remote granulomatous disease No acute cardiopulmonary findings       ASSESSMENT & PLAN     ASSESSMENT / PLAN:     Principal Problem:    DKA (diabetic ketoacidoses) (HCC)  Active Problems:    Flexor tenosynovitis of finger    Bipolar disorder, mixed ([address]    Polysubstance abuse ([facility] [address]  Resolved Problems:    * No resolved hospital problems. *    Tenosynovitis Right index finger  - s/p drainage of abscess per plastics surgery  - Plastic surgery following- Flexor tendon distal to PIP was necrotic and debrided so DIP will not be able to flex if finger survives. Volar skin between PIP and IP was also necrotic. Discussed option for distal finger amputation vs. Wound care. Patient chose to continue with TID dressing changes. Okay to discharge home with dressing changes when ID feels infection is under control and follow-up in [facility]   - Received 1 dose of Vancomycin and TDAP vaccine shot  - Wound and Abscess culture: many gram positive rods and yeast light growth  - Plastic surgery following: flexor tendon distal to PIP debridement yesterday. DIP not able to flex the fingers survives. 2 options were discussed with the patient to salvage the finger that will have limited motion. Patient shows 2 to 3 times daily dressing changes instead of amputation. Per plastics patient can be discharged when ID feels infection is under control and follow-up with [facility]  - ID following: patient's abx changed to PO. On Amoxicillin 500 mg TID and Voriconazole 200 mg BID. - Wound culture: gram positive rods, yeast- not candida albicans or candida dubliniensis light growth        DKA- resolved; bridged to Lantus  - Glucose 719, beta-hydroxy 4.42, on admission   - blood gas: pH 7.283/ pCO2 53.7/ pO2 20.1/ HCO3 24.6  - Patient received 10 units insulin and started on DKA protocol with insulin gtt and IVF in the ED  - On IVF NS @ 100 mL/h   - HbA1c 14. 2- non compliant with insulin at home  - Blood glucose: 121<--221<--247<--259; Continue Lantus 25 units BID; medium dose sliding scale  - Hypoglycemia protocol  - Diabetic diet- patient tolerating diet well.            Polysubstance abuse  - Urine tox: positive for benzodiazepine, cocaine and opiates  - Avoid beta blockers  - Monitor for withdrawal symptoms  - Monitor BP closely  - erratic behavior overnight, patient wanted to leave AMA, refused lab draws on 09/15  - Psychiatry consulted: low risk of suicide and harm to others, no indicate for sitter admission to psychiatry. Recommend outpatient follow-up         HTN- controlled  - Controlled 90s-100s/70s; HR 80s  - possibly secondary to polysubstance abuse  - Monitor BP; avoid beta-blockers        Thrombocytopenia- FUC 041- resolved  - secondary to infection vs. Drug abuse  - Continue to monitor        MARTIN- Cr 1.23 (0.8~0.9) -->1.40--> 1.59--> 1.23- resolving; no labs this morning  - Continue IVF @ 100 mL/h  - Continue to monitor  - US Renal ordered, will follow up          DVT ppx : Heparin sq  GI ppx: Protonix    PT/OT: On board  Discharge Planning / [place]: Patient will go home after discharge    Remigio Pope MD  Internal Medicine Resident, PGY-1  [facility];  [place], [place]  9/18/2021, 7:53 AM

## 2021-09-18 NOTE — PROGRESS NOTES
benzodiazepine    Cholelithiasis seen on renal US 9/17/21    Summary of relevant labs:  Labs:    BUN: 12  Creat: 1.23    WBC: 8.0-->8.6  Creat normal    Micro:  BC  Wound cx GPR  Imaging:  R hand xray 9/14/21  Marked soft tissue swelling involving the 2nd finger and dorsum of the hand. No acute osseous abnormality evident       I have personally reviewed the past medical history, past surgical history, medications, social history, and family history, and I haveupdated the database accordingly. Allergies:   Bactrim [sulfamethoxazole-trimethoprim] and Adhesive tape     Review of Systems:     Review of Systems   Constitutional: Negative for activity change and fatigue. HENT: Negative for congestion. Eyes: Negative for photophobia and redness. Respiratory: Negative for apnea, cough and shortness of breath. Cardiovascular: Negative for chest pain. Gastrointestinal: Negative for abdominal distention. Endocrine: Negative for polyphagia and polyuria. Genitourinary: Negative for dysuria, flank pain and urgency. Musculoskeletal: Positive for arthralgias. Skin: Positive for color change and wound. Allergic/Immunologic: Negative for immunocompromised state. Neurological: Negative for dizziness and seizures. Hematological: Negative for adenopathy. Psychiatric/Behavioral: Negative for agitation. Physical Examination :       Physical Exam  Constitutional:       Appearance: Normal appearance. She is not ill-appearing, toxic-appearing or diaphoretic. HENT:      Head: Normocephalic and atraumatic. Nose: Nose normal.      Mouth/Throat:      Mouth: Mucous membranes are moist.   Eyes:      General: No scleral icterus. Conjunctiva/sclera: Conjunctivae normal.      Pupils: Pupils are equal, round, and reactive to light. Cardiovascular:      Rate and Rhythm: Normal rate and regular rhythm. Heart sounds: Normal heart sounds. No murmur heard.      Pulmonary:      Effort: No respiratory distress. Breath sounds: Normal breath sounds. Abdominal:      General: There is no distension. Palpations: Abdomen is soft. Tenderness: There is no abdominal tenderness. Genitourinary:     Comments: No cullen  Musculoskeletal:         General: No swelling. Cervical back: Neck supple. No rigidity or tenderness. Skin:     General: Skin is dry. Coloration: Skin is not jaundiced. Findings: Erythema and lesion present. Neurological:      Mental Status: She is alert and oriented to person, place, and time. Cranial Nerves: No cranial nerve deficit. Psychiatric:         Mood and Affect: Mood normal.         Thought Content:  Thought content normal.         Past Medical History:     Past Medical History:   Diagnosis Date    Acid reflux 5/29/2017    Acute cystitis with hematuria 10/11/2016    Acute non-recurrent maxillary sinusitis 11/28/2017    Asthma     Bipolar 1 disorder ([address] 1/4/2018    Bipolar disorder, mixed ([address]     BMI 34.0-34.9,adult 11/28/2017    Cannabis use disorder, severe, dependence ([address] 12/19/2017    Cerebrovascular accident (CVA) ([address] 6/14/2017    Chest pain 11/5/2016    Chronic renal insufficiency     Cocaine abuse ([address] 1/5/2018    COVID-19 virus RNA test result unknown     DDD (degenerative disc disease), cervical     Diabetes mellitus ([address]     Dizziness 6/13/2017    Fibromyalgia     History of stroke 9/9/2017    Homicidal ideation 11/6/2017    Hyperglycemia     Hypertension     Hypotension 1/18/2019    IDDM (insulin dependent diabetes mellitus) 12/21/2015    Lupus ([address]     Migraine     Neuropathy     Neuropathy     Polysubstance abuse ([address] 9/17/2017    Post traumatic stress disorder (PTSD)     Posttraumatic stress disorder 5/29/2017    Recurrent depression ([address] 5/29/2017    Recurrent major depressive disorder, in partial remission ([address] 11/28/2017    Screening mammogram, encounter for 11/28/2017    Severe recurrent major depression with psychotic features ([facility] [address] 12/19/2017    Severe recurrent major depression without psychotic features ([facility] [address] 12/19/2017    Stroke (cerebrum) ([facility] [address] 6/14/2017    Stroke [facility]     per chart notes    Suicidal ideation     Suicidal intent 3/10/2017    Vitamin D deficiency 11/28/2017    White matter changes 6/13/2017       Past Surgical  History:     Past Surgical History:   Procedure Laterality Date    ABDOMEN SURGERY      drain tube    ABSCESS DRAINAGE      right buttock    CATARACT REMOVAL WITH IMPLANT Bilateral     CHEST TUBE INSERTION      FINGER AMPUTATION Left 03/13/2021    LEFT RING FINER AMPUTATION performed by Rosa Matos MD at [address] Right 09/15/2021     I&D INDEX FINGER     HAND SURGERY Right 09/14/2021    I&D INDEX FINGER performed by Rosa Matos MD at [address] [place]  Right 9/15/2021    I&D INDEX FINGER performed by Rosa Matos MD at [facility] Bilateral        Medications:      insulin glargine  25 Units SubCUTAneous BID    voriconazole  200 mg Oral 2 times per day    amoxicillin  500 mg Oral 3 times per day    heparin (porcine)  5,000 Units SubCUTAneous 3 times per day    insulin lispro  0-12 Units SubCUTAneous TID WC    insulin lispro  0-6 Units SubCUTAneous Nightly    sodium chloride  1,000 mL IntraVENous Once    budesonide-formoterol  2 puff Inhalation BID    pantoprazole  20 mg Oral BID AC    traZODone  150 mg Oral Nightly    pregabalin  75 mg Oral BID    venlafaxine  150 mg Oral Daily with breakfast    sodium chloride flush  5-40 mL IntraVENous 2 times per day       Social History:     Social History     Socioeconomic History    Marital status: Legally      Spouse name: Not on file    Number of children: Not on file    Years of education: Not on file    Highest education level: Not on file   Occupational History    Not on file   Tobacco Use    Smoking status: Never Smoker    Smokeless tobacco: Never Used   Vaping Use    Vaping Use: Never used   Substance and Sexual Activity    Alcohol use: Not Currently    Drug use: Yes     Types: Marijuana, Cocaine     Comment: + Cocaine 2/2021 also, see Care Everywhere UDS    Sexual activity: Not Currently     Partners: Male   Other Topics Concern    Not on file   Social History Narrative    Not on file     Social Determinants of Health     Financial Resource Strain: High Risk    Difficulty of Paying Living Expenses: Hard   Food Insecurity: Food Insecurity Present    Worried About Running Out of Food in the Last Year: Often true    Kelin of Food in the Last Year: Often true   Transportation Needs: Unmet Transportation Needs    Lack of Transportation (Medical):  Yes    Lack of Transportation (Non-Medical): Yes   Physical Activity: Inactive    Days of Exercise per Week: 0 days    Minutes of Exercise per Session: 0 min   Stress: Stress Concern Present    Feeling of Stress : Rather much   Social Connections:     Frequency of Communication with Friends and Family:     Frequency of Social Gatherings with Friends and Family:     Attends [employer] Services:     Active Member of Clubs or Organizations:     Attends Club or Organization Meetings:     Marital Status:    Intimate Partner Violence:     Fear of Current or Ex-Partner:     Emotionally Abused:     Physically Abused:     Sexually Abused:        Family History:     Family History   Problem Relation Age of Onset    Diabetes Mother     Stroke Mother     Diabetes Father     Diabetes Sister     Diabetes Brother     Other Son         GSW    No Known Problems Sister       Medical Decision Making:   I have independently reviewed/ordered the following labs:    CBC with Differential:   Recent Labs     09/16/21  0732 09/17/21  0629   WBC 8.0 8.6   HGB 8.0* 8.7*   HCT 26.3* 29.4*    395   LYMPHOPCT 19* 27   MONOPCT 11 12     BMP:  Recent Labs     09/16/21  0732 09/17/21  0629   * 134*   K 4.1 4.3    104   CO2 21 18*   BUN 16 12   CREATININE 1.59* 1.23*     Hepatic Function Panel:   No results for input(s): PROT, LABALBU, BILIDIR, IBILI, BILITOT, ALKPHOS, ALT, AST in the last 72 hours. No results for input(s): RPR in the last 72 hours. No results for input(s): HIV in the last 72 hours. No results for input(s): BC in the last 72 hours. Lab Results   Component Value Date    CREATININE 1.23 09/17/2021    GLUCOSE 319 09/17/2021       Detailed results:  Specimen Description 09/14/2021  8:[address]  [address]   . ABSCESS . HAND    Special Requests 09/14/2021  8:[address]  [address]   NOT REPORTED    Direct Exam Abnormal  09/14/2021  8:25 AM [address]    Direct Exam Abnormal  09/14/2021  8:25 AM [address] RODS    Culture Abnormal  09/14/2021  8:[address]  [address]   NORMAL SKIN EMERSON    Culture Abnormal  09/14/2021  8:25 AM [address], NOT CANDIDA ALBICANS OR CANDIDA DUBLINIENSIS LIGHT GROWTH            Thank you for allowing us to participate in the care of this patient. Please call with questions. This note is created with the assistance of a speech recognition program.  While intending to generate adocument that actually reflects the content of the visit, the document can still have some errors including those of syntax and sound a like substitutions which may escape proof reading. It such instances, actual meaningcan be extrapolated by contextual diversion. LAILA Nogueira - CNP  Office: [phone]  Perfect serve / office [phone]      ATTESTATION:    I have discussed the case, including pertinent history and exam findings with the APRN. I have evaluated the  History, physical findings and pictures of the patient and the key elements of the encounter have been performed by me.  I have reviewed the laboratory data, other diagnostic studies and discussed them with the APRN. I have updated the medical record where necessary. I agree with the assessment, plan and orders as documented by the APRN.     Christo Noriega MD.

## 2021-09-18 NOTE — PROGRESS NOTES
CLINICAL PHARMACY NOTE: MEDS TO BEDS    Total # of Prescriptions Filled: 2   The following medications were delivered to the patient:  · Amoxicillin 500 mg  · Voriconazole 200mg    Additional Documentation:

## 2021-09-19 LAB
ABSOLUTE EOS #: 0.18 K/UL (ref 0–0.44)
ABSOLUTE IMMATURE GRANULOCYTE: 0.05 K/UL (ref 0–0.3)
ABSOLUTE LYMPH #: 2.65 K/UL (ref 1.1–3.7)
ABSOLUTE MONO #: 1.18 K/UL (ref 0.1–1.2)
ANION GAP SERPL CALCULATED.3IONS-SCNC: 10 MMOL/L (ref 9–17)
BASOPHILS # BLD: 1 % (ref 0–2)
BASOPHILS ABSOLUTE: 0.06 K/UL (ref 0–0.2)
BUN BLDV-MCNC: 7 MG/DL (ref 6–20)
BUN/CREAT BLD: NORMAL (ref 9–20)
CALCIUM SERPL-MCNC: 8.8 MG/DL (ref 8.6–10.4)
CHLORIDE BLD-SCNC: 105 MMOL/L (ref 98–107)
CO2: 25 MMOL/L (ref 20–31)
CREAT SERPL-MCNC: 0.74 MG/DL (ref 0.5–0.9)
CULTURE: ABNORMAL
DIFFERENTIAL TYPE: ABNORMAL
DIRECT EXAM: ABNORMAL
EOSINOPHILS RELATIVE PERCENT: 2 % (ref 1–4)
GFR AFRICAN AMERICAN: >60 ML/MIN
GFR NON-AFRICAN AMERICAN: >60 ML/MIN
GFR SERPL CREATININE-BSD FRML MDRD: NORMAL ML/MIN/{1.73_M2}
GFR SERPL CREATININE-BSD FRML MDRD: NORMAL ML/MIN/{1.73_M2}
GLUCOSE BLD-MCNC: 188 MG/DL (ref 65–105)
GLUCOSE BLD-MCNC: 200 MG/DL (ref 65–105)
GLUCOSE BLD-MCNC: 205 MG/DL (ref 65–105)
GLUCOSE BLD-MCNC: 73 MG/DL (ref 65–105)
GLUCOSE BLD-MCNC: 74 MG/DL (ref 70–99)
HCT VFR BLD CALC: 28.7 % (ref 36.3–47.1)
HEMOGLOBIN: 8.7 G/DL (ref 11.9–15.1)
IMMATURE GRANULOCYTES: 1 %
LYMPHOCYTES # BLD: 26 % (ref 24–43)
Lab: ABNORMAL
Lab: ABNORMAL
MCH RBC QN AUTO: 31.9 PG (ref 25.2–33.5)
MCHC RBC AUTO-ENTMCNC: 30.3 G/DL (ref 28.4–34.8)
MCV RBC AUTO: 105.1 FL (ref 82.6–102.9)
MONOCYTES # BLD: 12 % (ref 3–12)
NRBC AUTOMATED: 0.6 PER 100 WBC
PDW BLD-RTO: 13.9 % (ref 11.8–14.4)
PLATELET # BLD: 436 K/UL (ref 138–453)
PLATELET ESTIMATE: ABNORMAL
PMV BLD AUTO: 10.1 FL (ref 8.1–13.5)
POTASSIUM SERPL-SCNC: 4.2 MMOL/L (ref 3.7–5.3)
RBC # BLD: 2.73 M/UL (ref 3.95–5.11)
RBC # BLD: ABNORMAL 10*6/UL
SEG NEUTROPHILS: 58 % (ref 36–65)
SEGMENTED NEUTROPHILS ABSOLUTE COUNT: 6.12 K/UL (ref 1.5–8.1)
SODIUM BLD-SCNC: 140 MMOL/L (ref 135–144)
SPECIMEN DESCRIPTION: ABNORMAL
SPECIMEN DESCRIPTION: ABNORMAL
WBC # BLD: 10.2 K/UL (ref 3.5–11.3)
WBC # BLD: ABNORMAL 10*3/UL

## 2021-09-19 PROCEDURE — 6360000002 HC RX W HCPCS: Performed by: STUDENT IN AN ORGANIZED HEALTH CARE EDUCATION/TRAINING PROGRAM

## 2021-09-19 PROCEDURE — 6370000000 HC RX 637 (ALT 250 FOR IP): Performed by: INTERNAL MEDICINE

## 2021-09-19 PROCEDURE — 97110 THERAPEUTIC EXERCISES: CPT

## 2021-09-19 PROCEDURE — 6370000000 HC RX 637 (ALT 250 FOR IP): Performed by: STUDENT IN AN ORGANIZED HEALTH CARE EDUCATION/TRAINING PROGRAM

## 2021-09-19 PROCEDURE — 6370000000 HC RX 637 (ALT 250 FOR IP): Performed by: PLASTIC SURGERY

## 2021-09-19 PROCEDURE — 80048 BASIC METABOLIC PNL TOTAL CA: CPT

## 2021-09-19 PROCEDURE — 85025 COMPLETE CBC W/AUTO DIFF WBC: CPT

## 2021-09-19 PROCEDURE — 97535 SELF CARE MNGMENT TRAINING: CPT

## 2021-09-19 PROCEDURE — 94640 AIRWAY INHALATION TREATMENT: CPT

## 2021-09-19 PROCEDURE — 36415 COLL VENOUS BLD VENIPUNCTURE: CPT

## 2021-09-19 PROCEDURE — 2580000003 HC RX 258: Performed by: PLASTIC SURGERY

## 2021-09-19 PROCEDURE — 1200000000 HC SEMI PRIVATE

## 2021-09-19 PROCEDURE — 82947 ASSAY GLUCOSE BLOOD QUANT: CPT

## 2021-09-19 PROCEDURE — 97162 PT EVAL MOD COMPLEX 30 MIN: CPT

## 2021-09-19 PROCEDURE — 99232 SBSQ HOSP IP/OBS MODERATE 35: CPT | Performed by: INTERNAL MEDICINE

## 2021-09-19 PROCEDURE — 97530 THERAPEUTIC ACTIVITIES: CPT

## 2021-09-19 PROCEDURE — 97166 OT EVAL MOD COMPLEX 45 MIN: CPT

## 2021-09-19 PROCEDURE — APPSS30 APP SPLIT SHARED TIME 16-30 MINUTES: Performed by: NURSE PRACTITIONER

## 2021-09-19 RX ORDER — FENTANYL CITRATE 50 UG/ML
25 INJECTION, SOLUTION INTRAMUSCULAR; INTRAVENOUS PRN
Status: DISCONTINUED | OUTPATIENT
Start: 2021-09-19 | End: 2021-09-24 | Stop reason: HOSPADM

## 2021-09-19 RX ORDER — INSULIN GLARGINE 100 [IU]/ML
20 INJECTION, SOLUTION SUBCUTANEOUS 2 TIMES DAILY
Status: DISCONTINUED | OUTPATIENT
Start: 2021-09-19 | End: 2021-09-24 | Stop reason: HOSPADM

## 2021-09-19 RX ADMIN — AMOXICILLIN 500 MG: 500 CAPSULE ORAL at 06:27

## 2021-09-19 RX ADMIN — AMOXICILLIN 500 MG: 500 CAPSULE ORAL at 21:49

## 2021-09-19 RX ADMIN — SODIUM CHLORIDE, PRESERVATIVE FREE 10 ML: 5 INJECTION INTRAVENOUS at 21:42

## 2021-09-19 RX ADMIN — INSULIN LISPRO 4 UNITS: 100 INJECTION, SOLUTION INTRAVENOUS; SUBCUTANEOUS at 12:19

## 2021-09-19 RX ADMIN — PREGABALIN 75 MG: 75 CAPSULE ORAL at 21:42

## 2021-09-19 RX ADMIN — INSULIN LISPRO 4 UNITS: 100 INJECTION, SOLUTION INTRAVENOUS; SUBCUTANEOUS at 16:55

## 2021-09-19 RX ADMIN — TRAZODONE HYDROCHLORIDE 150 MG: 100 TABLET ORAL at 21:42

## 2021-09-19 RX ADMIN — VORICONAZOLE 200 MG: 200 TABLET ORAL at 09:40

## 2021-09-19 RX ADMIN — OXYCODONE HYDROCHLORIDE 5 MG: 5 TABLET ORAL at 18:00

## 2021-09-19 RX ADMIN — VORICONAZOLE 200 MG: 200 TABLET ORAL at 21:42

## 2021-09-19 RX ADMIN — ACETAMINOPHEN 650 MG: 325 TABLET ORAL at 21:49

## 2021-09-19 RX ADMIN — INSULIN GLARGINE 20 UNITS: 100 INJECTION, SOLUTION SUBCUTANEOUS at 21:44

## 2021-09-19 RX ADMIN — INSULIN LISPRO 1 UNITS: 100 INJECTION, SOLUTION INTRAVENOUS; SUBCUTANEOUS at 21:44

## 2021-09-19 RX ADMIN — VENLAFAXINE HYDROCHLORIDE 150 MG: 150 CAPSULE, EXTENDED RELEASE ORAL at 09:40

## 2021-09-19 RX ADMIN — SODIUM CHLORIDE, PRESERVATIVE FREE 10 ML: 5 INJECTION INTRAVENOUS at 09:40

## 2021-09-19 RX ADMIN — BUDESONIDE AND FORMOTEROL FUMARATE DIHYDRATE 2 PUFF: 80; 4.5 AEROSOL RESPIRATORY (INHALATION) at 08:54

## 2021-09-19 RX ADMIN — AMOXICILLIN 500 MG: 500 CAPSULE ORAL at 13:41

## 2021-09-19 RX ADMIN — OXYCODONE HYDROCHLORIDE 5 MG: 5 TABLET ORAL at 05:28

## 2021-09-19 RX ADMIN — PANTOPRAZOLE SODIUM 20 MG: 20 TABLET, DELAYED RELEASE ORAL at 16:56

## 2021-09-19 RX ADMIN — PANTOPRAZOLE SODIUM 20 MG: 20 TABLET, DELAYED RELEASE ORAL at 09:40

## 2021-09-19 RX ADMIN — PREGABALIN 75 MG: 75 CAPSULE ORAL at 09:40

## 2021-09-19 RX ADMIN — INSULIN GLARGINE 25 UNITS: 100 INJECTION, SOLUTION SUBCUTANEOUS at 09:41

## 2021-09-19 RX ADMIN — OXYCODONE HYDROCHLORIDE 5 MG: 5 TABLET ORAL at 09:40

## 2021-09-19 RX ADMIN — OXYCODONE HYDROCHLORIDE 5 MG: 5 TABLET ORAL at 21:49

## 2021-09-19 RX ADMIN — OXYCODONE HYDROCHLORIDE 5 MG: 5 TABLET ORAL at 00:55

## 2021-09-19 RX ADMIN — FENTANYL CITRATE 25 MCG: 50 INJECTION, SOLUTION INTRAMUSCULAR; INTRAVENOUS at 16:55

## 2021-09-19 RX ADMIN — OXYCODONE HYDROCHLORIDE 5 MG: 5 TABLET ORAL at 13:40

## 2021-09-19 ASSESSMENT — PAIN SCALES - GENERAL
PAINLEVEL_OUTOF10: 10
PAINLEVEL_OUTOF10: 5
PAINLEVEL_OUTOF10: 10
PAINLEVEL_OUTOF10: 7

## 2021-09-19 ASSESSMENT — ENCOUNTER SYMPTOMS
COUGH: 0
ABDOMINAL DISTENTION: 0
COLOR CHANGE: 1
APNEA: 0
EYE REDNESS: 0
SHORTNESS OF BREATH: 0
PHOTOPHOBIA: 0

## 2021-09-19 ASSESSMENT — PAIN DESCRIPTION - ORIENTATION
ORIENTATION: RIGHT
ORIENTATION: RIGHT

## 2021-09-19 ASSESSMENT — PAIN DESCRIPTION - DESCRIPTORS: DESCRIPTORS: DISCOMFORT;STABBING;THROBBING

## 2021-09-19 ASSESSMENT — PAIN DESCRIPTION - ONSET
ONSET: ON-GOING
ONSET: ON-GOING

## 2021-09-19 ASSESSMENT — PAIN DESCRIPTION - PAIN TYPE
TYPE: ACUTE PAIN
TYPE: ACUTE PAIN

## 2021-09-19 ASSESSMENT — PAIN DESCRIPTION - LOCATION
LOCATION: HAND
LOCATION: HAND

## 2021-09-19 ASSESSMENT — PAIN - FUNCTIONAL ASSESSMENT: PAIN_FUNCTIONAL_ASSESSMENT: PREVENTS OR INTERFERES SOME ACTIVE ACTIVITIES AND ADLS

## 2021-09-19 ASSESSMENT — PAIN DESCRIPTION - PROGRESSION: CLINICAL_PROGRESSION: NOT CHANGED

## 2021-09-19 ASSESSMENT — PAIN DESCRIPTION - FREQUENCY: FREQUENCY: CONTINUOUS

## 2021-09-19 NOTE — PROGRESS NOTES
[facility]  Internal Medicine Teaching Residency Program  Inpatient Daily Progress Note  ______________________________________________________________________________    Patient: Mirna Gaviria  YOB: 1965   WEU:[id number]    Acct: [de-identified]     Room: 2023/2023-01  Admit date: 9/14/2021  Today's date: 09/19/21  Number of days in the hospital: 5    SUBJECTIVE   Admitting Diagnosis: DKA (diabetic ketoacidoses) ([facility]  CC: Right index finger pain    Pt examined at bedside. Chart & results reviewed. No acute events overnight. Patient remained afebrile and hemodynamically stable, saturating well on room air. Patient complained of right hand pain, received Fentanyl 25 mg IV last night-one dose. Pain regimen: Roxicodone 5 mg q4 PRN    ID following: Continue Amoxicillin and Voriconazole. Blood glucose: 155<--147<--121<--221; continue on Lantus 25 BID    Placement: Patient was unable to complete dressing changes on her own at home, requested home care, dressing changes are TID, so, home care unable to cover. Patient agreeable to SNF and referral sent to [facility] ROS:  Constitutional:  negative for chills, fevers, sweats  Respiratory:  negative for cough, dyspnea on exertion, hemoptysis, shortness of breath, wheezing  Cardiovascular:  negative for chest pain, chest pressure/discomfort, lower extremity edema, palpitations  Gastrointestinal:  negative for abdominal pain, constipation, diarrhea, nausea, vomiting  Neurological:  negative for dizziness, headache    BRIEF HISTORY     The patient is a pleasant 56 y. o. female presents with a chief complaint of right index finger pain. The patient has PMH significant for DM-2, HTN, Bipolar disorder, and polysubstance abuse. The patient reported that she had a fall yesterday and injured her right index finger and since then has had swelling and pain. She denies hitting her head or loss of consciousness.  She and soft without organomegaly. No rebound, rigidity or guarding was appreciated. Lymphatic: No lymphadenopathy. Musculoskeletal: Normal curvature of the spine. No gross muscle weakness. Extremities:  No lower extremity edema, ulcerations, tenderness, varicosities or erythema. Muscle size, tone and strength are normal.  No involuntary movements are noted. Skin:  Warm and dry. Good color, turgor and pigmentation. No lesions or scars.   No cyanosis or clubbing  Neurological/Psychiatric: The patient's general behavior, level of consciousness, thought content and emotional status is normal.        Medications:  Scheduled Medications:    insulin glargine  25 Units SubCUTAneous BID    voriconazole  200 mg Oral 2 times per day    amoxicillin  500 mg Oral 3 times per day    heparin (porcine)  5,000 Units SubCUTAneous 3 times per day    insulin lispro  0-12 Units SubCUTAneous TID WC    insulin lispro  0-6 Units SubCUTAneous Nightly    sodium chloride  1,000 mL IntraVENous Once    budesonide-formoterol  2 puff Inhalation BID    pantoprazole  20 mg Oral BID AC    traZODone  150 mg Oral Nightly    pregabalin  75 mg Oral BID    venlafaxine  150 mg Oral Daily with breakfast    sodium chloride flush  5-40 mL IntraVENous 2 times per day     Continuous Infusions:    sodium chloride Stopped (09/18/21 1900)    dextrose      sodium chloride       PRN Medicationsglucose, 15 g, PRN  dextrose, 12.5 g, PRN  glucagon (rDNA), 1 mg, PRN  dextrose, 100 mL/hr, PRN  albuterol sulfate HFA, 2 puff, Q4H PRN  sodium chloride flush, 5-40 mL, PRN  sodium chloride, 25 mL, PRN  ondansetron, 4 mg, Q8H PRN   Or  ondansetron, 4 mg, Q6H PRN  polyethylene glycol, 17 g, Daily PRN  acetaminophen, 650 mg, Q6H PRN   Or  acetaminophen, 650 mg, Q6H PRN  dextrose, 12.5 g, PRN  oxyCODONE, 5 mg, Q4H PRN        Diagnostic Labs:  CBC:   Recent Labs     09/16/21  0732 09/17/21  0629   WBC 8.0 8.6   RBC 2.58* 2.81*   HGB 8.0* 8.7*   HCT 26.3* surgery  - Plastic surgery following- Flexor tendon distal to PIP was necrotic and debrided so DIP will not be able to flex if finger survives. Volar skin between PIP and IP was also necrotic. Discussed option for distal finger amputation vs. Wound care. Patient chose to continue with TID dressing changes. Okay to discharge home with dressing changes when ID feels infection is under control and follow-up in [facility]   - Received 1 dose of Vancomycin and TDAP vaccine shot  - Wound and Abscess culture: many gram positive rods and yeast light growth  - Plastic surgery following: flexor tendon distal to PIP debridement yesterday.  DIP not able to flex the fingers survives.  2 options were discussed with the patient to salvage the finger that will have limited motion.  Patient shows 2 to 3 times daily dressing changes instead of amputation.  Per plastics patient can be discharged when ID feels infection is under control and follow-up with [facility] clinic  - ID following: Continue Amoxicillin 500 mg TID POand Voriconazole 200 mg BID PO.   - Wound culture: gram positive rods, yeast- not candida albicans or candida dubliniensis light growth        DKA- resolved; bridged to Lantus  - Glucose 719, beta-hydroxy 4.42, on admission   - blood gas: pH 7.283/ pCO2 53.7/ pO2 20.1/ HCO3 24.6  - Patient received 10 units insulin and started on DKA protocol with insulin gtt and IVF in the ED  - On IVF NS @ 100 mL/h   - HbA1c 14. 2- non compliant with insulin at home  - Blood glucose: 155<--147<--121<--221; Continue on Lantus 25 BID; medium dose sliding scale  - Hypoglycemia protocol  - Diabetic diet- patient tolerating diet well.            Polysubstance abuse  - Urine tox: positive for benzodiazepine, cocaine and opiates  - Avoid beta blockers  - Monitor for withdrawal symptoms  - Monitor BP closely  - erratic behavior overnight, patient wanted to leave AMA, refused lab draws on 09/15  - Psychiatry consulted: low risk of suicide and harm to others, no indicate for sitter admission to psychiatry. Recommend outpatient follow-up         HTN- controlled  - Controlled 120s/80s; HR 80s  - Monitor BP; avoid beta-blockers due to drug abuse history        Thrombocytopenia- GRN 483- resolved- pending labs this am  - secondary to infection vs. Drug abuse  - Continue to monitor        MARTIN- Cr 1.23 (0.8~0.9) - resolving; pending labs this morning  - Continue IVF @ 100 mL/h  - Continue to monitor  - US Renal: unremarkable kidneys and urinary bladder           DVT ppx : Heparin sq- patient refused heparin shot today  GI ppx: Protonix     PT/OT: On board- needs updated notes  Discharge Planning / SW: Referral sent to [address] [place] [address],[address] due to dressing change needs TICARA Correa MD  Internal Medicine Resident, PGY-1  [facility];  [place], [place]  9/19/2021, 7:09 AM

## 2021-09-19 NOTE — PROGRESS NOTES
Physical Therapy    Facility/Department: [facility] 2  Initial Assessment    NAME: Madhuri Kraft  : 1965  MRN: [id number]    Date of Service: 2021    Discharge Recommendations:  Patient would benefit from continued therapy after discharge       Pt presents to [facility] s/p fall w/ trauma to Rt index finger that quickly escalated. Pt now requiring  3 wound care dressing changes per day. Assessment   Body structures, Functions, Activity limitations: Decreased functional mobility ; Decreased endurance;Decreased balance;Decreased strength;Decreased posture;Decreased safe awareness    Assessment: Pt presents with + wound on dominant hand w/ inability to use due to excessive pain. Pt also w/ deficits of jean-pierre LE neuropathy so advanced that patient often needs to \"look\" at her feet to indicate where they are during exercises. Pt LE weakness is demonstrated functionally with significant difficulty getting up off of chairs (normal height). This Dolores Betts is also concerned re: patient re: her distraught over her recent loss of her loved ones. Will continue to work with patient while hospitalized. Specific instructions for Next Treatment: balance - standing dynamic/ static. gait. HEP  Prognosis: Good  Decision Making: Medium Complexity  Clinical Presentation: evolving  PT Education: General Safety;Precautions; Disease Specific Education  REQUIRES PT FOLLOW UP: Yes  Activity Tolerance  Activity Tolerance: Patient limited by fatigue       Patient Diagnosis(es): The primary encounter diagnosis was Tenosynovitis of finger. A diagnosis of Felon of finger was also pertinent to this visit.      has a past medical history of Acid reflux, Acute cystitis with hematuria, Acute non-recurrent maxillary sinusitis, Asthma, Bipolar 1 disorder ([address] Bipolar disorder, mixed ([address] BMI 34.0-34.9,adult, Cannabis use disorder, severe, dependence ([address] Cerebrovascular accident (CVA) ([address] Chest pain, Chronic renal insufficiency, Plantar flexion: 3+/5  Strength LLE  Strength LLE: Exception  L Hip Flexion: 3+/5  L Hip Extension: 3+/5  L Knee Flexion: 3+/5  L Knee Extension: 3+/5  L Ankle Dorsiflexion: 3/5  L Ankle Plantar Flexion: 3+/5  Motor Control  Gross Motor?: WFL  Sensation  Overall Sensation Status: Impaired (neuropathy jean-pierre hands/ feet -> pt reporting it goes all the way up her leg too)  Bed mobility  Supine to Sit: Modified independent  Sit to Supine: Modified independent  Scooting: Modified independent  Transfers  Sit to Stand:  (pt unable to stand up from chair without \"rocking\" and multiple attempts as she is only able to push w/ one UE)  Stand to sit: Stand by assistance  Bed to Chair: Contact guard assistance  Ambulation  Ambulation?: Yes  More Ambulation?: No  Ambulation 1  Surface: level tile  Device: No Device  Assistance: Minimal assistance  Quality of Gait: gait with abducted stance for ability to maintain balance. Pt with one episode of loss of balance during gait. Gait Deviations: Increased STEPHY; Decreased step length;Decreased step height;Decreased head and trunk rotation; Shuffles  Distance: 45 ft x 1; 55 ft x 1;  15 ft x1; 25 ft x 1    Toilet transfer - SBA +1.     Balance  Posture:  (forward head and kyphotic / rounded shoulders)  Sitting - Static: Good  Sitting - Dynamic: Good  Standing - Static: Fair;+  Standing - Dynamic: Fair  Tandem Stance R Leg:  (unable to attempt as pt unable to do feet together w/o loss of balance)  Standing balance exercises performed x ~ 1 minute each. ABducted gait stance x 1 minute SBA +1;  Pt attempted pulled feet together but was unable to sustain stance w/o UE support. Pt reporting fearful of falling    Exercises  Comments: pt education on OOB importance w/ positioning; elevating of Rt UE; deep breathing exercises x 7 reps; standing balance ex's; gait training w/ education re: device recommended.      Plan   Plan  Times per week: 5x/week  Specific instructions for Next Treatment: balance - standing dynamic/ static. gait. HEP  Current Treatment Recommendations: Strengthening, Neuromuscular Re-education, Home Exercise Program, Cognitive/Perceptual Training, Safety Education & Training, Balance Training, Endurance Training, Patient/Caregiver Education & Training, Functional Mobility Training, Transfer Training, Gait Training, Positioning  Safety Devices  Type of devices: All fall risk precautions in place, Call light within reach    G-Code 17/24     Goals  Short term goals  Time Frame for Short term goals: 12  Short term goal 1: Transfers independnet  Short term goal 2: Amb x 100 ft w/ cane independent  Short term goal 3: Pt issued HEP program and Ind w/ exercises  Short term goal 4: standing balance exercises SBA +1 w/ good safety awareness. Patient Goals   Patient goals : Pt goal is to get her hand healed and to improve strength     Therapy Time   Individual Concurrent Group Co-treatment   Time In 0950         Time Out 1048         Minutes 58          treatment time: 38 minutes. Additional 10 minutes for chart review.       Caryl Levy, PT

## 2021-09-19 NOTE — PROGRESS NOTES
Occupational Therapy   Occupational Therapy Initial Assessment  Date: 2021   Patient Name: Mateus Landeros  MRN: [id number]     : 1965    Date of Service: 2021  H and P from medical chart: The patient is a pleasant 56 y. o. female presents with a chief complaint of right index finger pain. The patient has PMH significant for DM-2, HTN, Bipolar disorder, and polysubstance abuse. The patient reported that she had a fall yesterday and injured her right index finger and since then has had swelling and pain. She denies hitting her head or loss of consciousness. She denies urine or fecal incontinence. She denies neck pain, chest pain, shortness of breath, abdominal pain. The patient was tearful during the interview and was unable to provide a detailed history. She denies intention of self-harm or suicidal ideation. Plastic surgery was consulted and patient was taken to OR for exploration and drainage of right index finger abscess. In the ED, the patient was afebrile Temp 98.5F, RR 18, HR 98, /123, SpO2 95%. Labs reviewed: Na 126, K 4.9, bicarb 16, BUN 11, Cr 1.08, Glucose 719, beta-hydroxy 4.42, WBC 9.4, Hgb 10.7, Plt 59, ESR 69, Blood gas: pH 7.283/ pCO2 53.7/ pO2 20.1/ HCO3 24.6Covid negative, wound culture shows gram negative rods. The patient received 10 units of insulin in the ED and was started on DKA protocol with insulin gtt and NS IVF. She was started on Vanc and Zosyn.      Tenosynovitis Right index finger  - s/p drainage of abscess per plastics surgery  - Plastic surgery following- Flexor tendon distal to PIP was necrotic and debrided so DIP will not be able to flex if finger survives. Volar skin between PIP and IP was also necrotic. Discussed option for distal finger amputation vs. Wound care. Patient chose to continue with TID dressing changes.  Okay to discharge home with dressing changes when ID feels infection is under control and follow-up in [facility] Hand clinic      Discharge Recommendations:  Patient would benefit from continued therapy after discharge. During evaluation, Pt demonstrated SUP for functional mobility and transfers, but requires MIN A for most ADL tasks secondary to impairments in right hand affecting ability to complete tasks IND. Pt lives home alone and would require assistance daily to assist with ADL tasks. OT Equipment Recommendations  Equipment Needed: No    Assessment   Performance deficits / Impairments: Decreased fine motor control;Decreased coordination;Decreased ADL status; Decreased high-level IADLs  Assessment: During evaluation, Pt demonstrated SUP for functional mobility and transfers, but requires MIN A for most ADL tasks secondary to impairments in right hand affecting ability to complete tasks IND. Pt lives home alone and would require assistance daily to assist with ADL tasks. Prognosis: Good  Decision Making: Medium Complexity  OT Education: OT Role;Plan of Care  Patient Education: Pt educated on OT role and POC for purpose of evaluation. Barriers to Learning: Pt agitated and in pain during evaluation. REQUIRES OT FOLLOW UP: Yes  Activity Tolerance  Activity Tolerance: Patient limited by pain  Activity Tolerance: 10/10 pain continued to be reported during evaluation in right hand. Pt also agitated by need of evaluation. Safety Devices  Safety Devices in place: Yes  Type of devices: Left in bed;Call light within reach;Nurse notified  Restraints  Initially in place: No           Patient Diagnosis(es): The primary encounter diagnosis was Tenosynovitis of finger. A diagnosis of Felon of finger was also pertinent to this visit.      has a past medical history of Acid reflux, Acute cystitis with hematuria, Acute non-recurrent maxillary sinusitis, Asthma, Bipolar 1 disorder ([address] Bipolar disorder, mixed ([address] BMI 34.0-34.9,adult, Cannabis use disorder, severe, dependence ([address] Cerebrovascular accident (CVA) ([address] Chest pain, Chronic renal insufficiency, Cocaine abuse ([facility] COVID-19 virus RNA test result unknown, DDD (degenerative disc disease), cervical, Diabetes mellitus ([facility] Dizziness, Fibromyalgia, History of stroke, Homicidal ideation, Hyperglycemia, Hypertension, Hypotension, IDDM (insulin dependent diabetes mellitus), Lupus ([facility] Migraine, Neuropathy, Neuropathy, Polysub[facility] Post traumatic stress disorder (PTSD), Posttraumatic stress disorder, Recurrent depression ([facility] Recurrent major depressive disorder, in partial remission ([facility] Screening mammogram, encounter for, Severe recurrent major depression with psychotic features ([facility] Severe recurrent major depression without psychotic features ([facility] Stroke (cerebrum) ([facility] Stroke ([facility] Suicidal ideation, Suicidal intent, Vitamin D deficiency, and White matter changes. has a past surgical history that includes Abscess Drainage; chest tube insertion; Abdomen surgery; Cataract removal with implant (Bilateral); LASIK (Bilateral); Finger amputation (Left, 03/13/2021); Hand surgery (Right, 09/14/2021); Finger surgery (Right, 09/15/2021); and Hand surgery (Right, 9/15/2021). Restrictions  Position Activity Restriction  Other position/activity restrictions: Left hand 4th digit PIP amputation, Left foot 4-5 digit amputation. Right hand 2nd digit tenosynovitis of finger s/p debridement with wound dressing applied. Subjective   General  Chart Reviewed: Yes  Patient assessed for rehabilitation services?: Yes  Family / Caregiver Present: No  Diagnosis: tenosynovitis of right 2nd digit s/p debridement  General Comment  Comments: RN okayed for Pt to be seen for OT evaluation. Pt reluctant and arguing throughout evaluation, but completed. Patient Currently in Pain: Yes  Pain Assessment  Pain Assessment: 0-10  Pain Level: 10  Patient's Stated Pain Goal: No pain  Pain Type: Acute pain  Pain Location: Hand  Pain Orientation: Right  Pain Descriptors: Discomfort; Stabbing; Throbbing  Pain Frequency: Continuous  Pain Onset: On-going  Clinical Progression: Not changed  Functional Pain Assessment: Prevents or interferes some active activities and ADLs  Non-Pharmaceutical Pain Intervention(s): Environmental changes; Emotional support  Response to Pain Intervention: Patient Satisfied  Multiple Pain Sites: No  Vital Signs  Pulse: 97  Heart Rate Source: Monitor  Resp: 22  BP: 98/85  BP Location: Left upper arm  MAP (mmHg): 92  Patient Position: Sitting  Patient Currently in Pain: Yes  Oxygen Therapy  SpO2: 97 %  O2 Device: None (Room air)  Social/Functional History  Social/Functional History  Lives With: Alone  Type of Home: Apartment  Home Layout: One level  Home Access: Elevator  Bathroom Shower/Tub: Tub/Shower unit  Bathroom Toilet: Standard  Bathroom Equipment: Grab bars in shower, Grab bars around toilet  Bathroom Accessibility: Walker accessible  Home Equipment:  (no DME)  ADL Assistance: Independent  Homemaking Assistance: Independent  Homemaking Responsibilities: Yes  Meal Prep Responsibility: Primary  Laundry Responsibility: Primary  Cleaning Responsibility: Primary  Bill Paying/Finance Responsibility: Primary  Shopping Responsibility: Primary  Ambulation Assistance: Independent  Transfer Assistance: Independent  Active : No  Patient's  Info: Friends and cabs  Mode of Transportation: Car  Occupation: Unemployed  Additional Comments: Pt reports \"I don't know\" when asked if she would be able to have support at home upon DC. Objective   Vision: Impaired  Vision Exceptions: Wears glasses at all times (lost glasses)  Hearing: Within functional limits    Orientation  Overall Orientation Status: Within Functional Limits     Balance  Sitting Balance: Independent  Standing Balance: Supervision  Standing Balance  Time: 10+ minutes  Activity: functional mobility, dressing, toileting  Comment: no AD  Functional Mobility  Functional - Mobility Device: No device  Activity: To/from bathroom; Other (in hallway)  Assist Level: Supervision  Functional Mobility Comments: Close SUP provided for longer distances for safety. Toilet Transfers  Toilet - Technique: Ambulating  Equipment Used: Standard toilet  Toilet Transfer: Supervision  Toilet Transfers Comments: no AD, use of grab bar  ADL  Feeding: Setup  Grooming: Supervision (Pt washed hands standing at sink with SUP.)  UE Bathing: Minimal assistance (secondary to right hand dressing (R hand dominant))  LE Bathing: Stand by assistance  UE Dressing: Minimal assistance (Pt donned gown over back with MIN A to thread right arm only.)  LE Dressing: Minimal assistance (MIN A to don left sock secondary to R hand deficits)  Toileting: Minimal assistance (MIN A provided for clothing mgt before toileting.)  Additional Comments: ADL scores based on skilled observations and clinical impressions unless otherwise stated. Tone RUE  RUE Tone: Normotonic  Tone LUE  LUE Tone: Normotonic  Coordination  Movements Are Fluid And Coordinated: No  Coordination and Movement description: Fine motor impairments  Quality of Movement Other  Comment: FMC impairments secondary to wound dressing to right hand and s/p debridement of 2nd digit. Bed mobility  Supine to Sit: Modified independent  Sit to Supine: Modified independent  Scooting: Modified independent  Comment: Pt supine at beginning/end of session. Transfers  Stand Step Transfers: Supervision  Stand Pivot Transfers: Supervision  Sit Pivot Transfers: Supervision  Slide Board: Supervision  Sit to stand: Supervision  Stand to sit: Supervision  Transfer Comments: SUP provided for safety awareness. No AD utilized.      Cognition  Overall Cognitive Status: WFL        Sensation  Overall Sensation Status: Impaired (n/t in hands and feet; diabetic neuropathy)        LUE AROM (degrees)  LUE AROM : WFL  Left Hand AROM (degrees)  Left Hand AROM: WFL  RUE AROM (degrees)  RUE AROM : WFL  Right Hand AROM (degrees)  Right Hand AROM:

## 2021-09-19 NOTE — PROGRESS NOTES
[facility] -   Infectious diseases evaluation  admission date 9/14/2021    reason for consultation:   Hand infection    Impression :   Current:  · DKA  · DM on insulin  · SLE  · Depressed  · Right 2nd finger infected soft tissues  ·  and open ulcer w drain age x 2 weeks pTA  · Right hand cellultis  · Cholelithiasis 9/17/21    Discussion / summary of stay / plan of care   ·   Recommendations   · Avoid antibiotics that would affect the kidneys and the antibiotic was DKA  · Amoxicillin initiated 9/17/21  · Fluconazole initialed 9/16/21-Discontinued 9/17/21  · Voriconazole initiated 9/17/21 for Candida Glabrata light growth on wound culture  · Blood cultures show no growth    Infection Control Recommendations   · [place] Precautions  · Contact Isolation       Antimicrobial Stewardship Recommendations   · Simplification of therapy  · Targeted therapy  · Per Kg dosing      Coordination ofOutpatient Care:   · Estimated Length of IV antimicrobials:  · Patient will need Midline / picc Catheter Insertion:   · Patient will need SNF:  · Patient will need outpatient wound care:     History of Present Illness:   Initial history:  Laly Koch is a 49y.o.-year-old female   Presented with right second finger pain and progressive discoloration found to have an abscess over the right second finger associated with some redness over the palm in the same hand, increased pain, patient presented with DKA, area was lanced and packed, patient seen for evaluation of antibiotics  She is emotional, depressed, lost her son her daughter and her  to the short period of time. Interval changes  9/19/2021   Patient Vitals for the past 8 hrs:   BP Pulse Resp SpO2   09/19/21 0843 98/85 97 22 97 %     Afebrile  VS stable    The patient is doing well on room air.      Wound culture is growing JUAN JOSE GLABRATA LIGHT GROWTH   Blood cultures show no growth    Chest x-ray negative,  Drug screen positive for cocaine and benzodiazepine    Cholelithiasis seen on renal US 9/17/21    Summary of relevant labs:  Labs:    BUN: 12-->7  Creat: 1.23-->0.74    WBC: 8.0-->8.6-->10.2      Micro:  BC  Wound cx GPR  Imaging:  R hand xray 9/14/21  Marked soft tissue swelling involving the 2nd finger and dorsum of the hand. No acute osseous abnormality evident       I have personally reviewed the past medical history, past surgical history, medications, social history, and family history, and I haveupdated the database accordingly. Allergies:   Bactrim [sulfamethoxazole-trimethoprim] and Adhesive tape     Review of Systems:     Review of Systems   Constitutional: Negative for activity change and fatigue. HENT: Negative for congestion. Eyes: Negative for photophobia and redness. Respiratory: Negative for apnea, cough and shortness of breath. Cardiovascular: Negative for chest pain. Gastrointestinal: Negative for abdominal distention. Endocrine: Negative for polyphagia and polyuria. Genitourinary: Negative for dysuria, flank pain and urgency. Musculoskeletal: Positive for arthralgias. Skin: Positive for color change and wound. Allergic/Immunologic: Negative for immunocompromised state. Neurological: Negative for dizziness and seizures. Hematological: Negative for adenopathy. Psychiatric/Behavioral: Negative for agitation. Physical Examination :       Physical Exam  Constitutional:       Appearance: Normal appearance. She is not ill-appearing, toxic-appearing or diaphoretic. HENT:      Head: Normocephalic and atraumatic. Nose: Nose normal.      Mouth/Throat:      Mouth: Mucous membranes are moist.   Eyes:      General: No scleral icterus. Conjunctiva/sclera: Conjunctivae normal.      Pupils: Pupils are equal, round, and reactive to light. Cardiovascular:      Rate and Rhythm: Normal rate and regular rhythm. Heart sounds: Normal heart sounds.  No murmur heard. Pulmonary:      Effort: No respiratory distress. Breath sounds: Normal breath sounds. Abdominal:      General: There is no distension. Palpations: Abdomen is soft. Tenderness: There is no abdominal tenderness. Genitourinary:     Comments: No cullen  Musculoskeletal:         General: No swelling. Cervical back: Neck supple. No rigidity or tenderness. Skin:     General: Skin is dry. Coloration: Skin is not jaundiced. Findings: Erythema and lesion present. Neurological:      Mental Status: She is alert and oriented to person, place, and time. Cranial Nerves: No cranial nerve deficit. Psychiatric:         Mood and Affect: Mood normal.         Thought Content:  Thought content normal.         Past Medical History:     Past Medical History:   Diagnosis Date    Acid reflux 5/29/2017    Acute cystitis with hematuria 10/11/2016    Acute non-recurrent maxillary sinusitis 11/28/2017    Asthma     Bipolar 1 disorder ([address] 1/4/2018    Bipolar disorder, mixed ([address]     BMI 34.0-34.9,adult 11/28/2017    Cannabis use disorder, severe, dependence ([address] 12/19/2017    Cerebrovascular accident (CVA) ([address] 6/14/2017    Chest pain 11/5/2016    Chronic renal insufficiency     Cocaine abuse ([address] 1/5/2018    COVID-19 virus RNA test result unknown     DDD (degenerative disc disease), cervical     Diabetes mellitus ([address]     Dizziness 6/13/2017    Fibromyalgia     History of stroke 9/9/2017    Homicidal ideation 11/6/2017    Hyperglycemia     Hypertension     Hypotension 1/18/2019    IDDM (insulin dependent diabetes mellitus) 12/21/2015    Lupus ([address]     Migraine     Neuropathy     Neuropathy     Polysubstance abuse ([address] 9/17/2017    Post traumatic stress disorder (PTSD)     Posttraumatic stress disorder 5/29/2017    Recurrent depression ([address] 5/29/2017    Recurrent major depressive disorder, in partial remission ([address] 11/28/2017    Screening mammogram, encounter for 11/28/2017    Severe recurrent major depression with psychotic features [facility] 12/19/2017    Severe recurrent major depression without psychotic features ([facility] 12/19/2017    Stroke (cerebrum) ([facility] 6/14/2017    Stroke ([facility]     per chart notes    Suicidal ideation     Suicidal intent 3/10/2017    Vitamin D deficiency 11/28/2017    White matter changes 6/13/2017       Past Surgical  History:     Past Surgical History:   Procedure Laterality Date    ABDOMEN SURGERY      drain tube    ABSCESS DRAINAGE      right buttock    CATARACT REMOVAL WITH IMPLANT Bilateral     CHEST TUBE INSERTION      FINGER AMPUTATION Left 03/13/2021    LEFT RING FINER AMPUTATION performed by Fabian Madison MD at [address] Right 09/15/2021     I&D INDEX FINGER     HAND SURGERY Right 09/14/2021    I&D INDEX FINGER performed by Fabian Madison MD at [address] Right 9/15/2021    I&D INDEX FINGER performed by Fabian Madison MD at [facility] Bilateral        Medications:      insulin glargine  25 Units SubCUTAneous BID    voriconazole  200 mg Oral 2 times per day    amoxicillin  500 mg Oral 3 times per day    heparin (porcine)  5,000 Units SubCUTAneous 3 times per day    insulin lispro  0-12 Units SubCUTAneous TID WC    insulin lispro  0-6 Units SubCUTAneous Nightly    sodium chloride  1,000 mL IntraVENous Once    budesonide-formoterol  2 puff Inhalation BID    pantoprazole  20 mg Oral BID AC    traZODone  150 mg Oral Nightly    pregabalin  75 mg Oral BID    venlafaxine  150 mg Oral Daily with breakfast    sodium chloride flush  5-40 mL IntraVENous 2 times per day       Social History:     Social History     Socioeconomic History    Marital status: Legally      Spouse name: Not on file    Number of children: Not on file    Years of education: Not on file    Highest education level: Not on file   Occupational History    Not on file Tobacco Use    Smoking status: Never Smoker    Smokeless tobacco: Never Used   Vaping Use    Vaping Use: Never used   Substance and Sexual Activity    Alcohol use: Not Currently    Drug use: Yes     Types: Marijuana, Cocaine     Comment: + Cocaine 2/2021 also, see Care Everywhere UDS    Sexual activity: Not Currently     Partners: Male   Other Topics Concern    Not on file   Social History Narrative    Not on file     Social Determinants of Health     Financial Resource Strain: High Risk    Difficulty of Paying Living Expenses: Hard   Food Insecurity: Food Insecurity Present    Worried About Running Out of Food in the Last Year: Often true    Kelin of Food in the Last Year: Often true   Transportation Needs: Unmet Transportation Needs    Lack of Transportation (Medical):  Yes    Lack of Transportation (Non-Medical): Yes   Physical Activity: Inactive    Days of Exercise per Week: 0 days    Minutes of Exercise per Session: 0 min   Stress: Stress Concern Present    Feeling of Stress : Rather much   Social Connections:     Frequency of Communication with Friends and Family:     Frequency of Social Gatherings with Friends and Family:     Attends Sabianist Services:     Active Member of Clubs or Organizations:     Attends Club or Organization Meetings:     Marital Status:    Intimate Partner Violence:     Fear of Current or Ex-Partner:     Emotionally Abused:     Physically Abused:     Sexually Abused:        Family History:     Family History   Problem Relation Age of Onset    Diabetes Mother     Stroke Mother     Diabetes Father     Diabetes Sister     Diabetes Brother     Other Son         GSW    No Known Problems Sister       Medical Decision Making:   I have independently reviewed/ordered the following labs:    CBC with Differential:   Recent Labs     09/17/21  0629 09/19/21  0747   WBC 8.6 10.2   HGB 8.7* 8.7*   HCT 29.4* 28.7*    436   LYMPHOPCT 27 26   MONOPCT 12 12 BMP:  Recent Labs     09/17/21  0629 09/19/21  0747   * 140   K 4.3 4.2    105   CO2 18* 25   BUN 12 7   CREATININE 1.23* 0.74     Hepatic Function Panel:   No results for input(s): PROT, LABALBU, BILIDIR, IBILI, BILITOT, ALKPHOS, ALT, AST in the last 72 hours. No results for input(s): RPR in the last 72 hours. No results for input(s): HIV in the last 72 hours. No results for input(s): BC in the last 72 hours. Lab Results   Component Value Date    CREATININE 0.74 09/19/2021    GLUCOSE 74 09/19/2021       Detailed results:  Specimen Description 09/14/2021  8:[address]  [address]   . ABSCESS . HAND    Special Requests 09/14/2021  8:[address]  [address]   NOT REPORTED    Direct Exam Abnormal  09/14/2021  8:25 AM [address]    Direct Exam Abnormal  09/14/2021  8:25 AM [address] RODS    Culture Abnormal  09/14/2021  8:[address]  [address]   NORMAL SKIN EMERSON    Culture Abnormal  09/14/2021  8:25 AM [address], NOT CANDIDA ALBICANS OR CANDIDA DUBLINIENSIS LIGHT GROWTH            Thank you for allowing us to participate in the care of this patient. Please call with questions. This note is created with the assistance of a speech recognition program.  While intending to generate adocument that actually reflects the content of the visit, the document can still have some errors including those of syntax and sound a like substitutions which may escape proof reading. It such instances, actual meaningcan be extrapolated by contextual diversion. Clementine Nixon, APRN - CNP  Office: [phone]  Perfect serve / office [phone]      ATTESTATION:    I have discussed the case, including pertinent history and exam findings with the APRN.  I have evaluated the  History, physical findings and pictures of the patient and the key elements of the encounter have been performed by me. I have reviewed the laboratory data, other diagnostic studies and discussed them with the APRN. I have updated the medical record where necessary. I agree with the assessment, plan and orders as documented by the APRN.     Nilson Agustin MD.

## 2021-09-19 NOTE — CARE COORDINATION
Voicemail left for Dottie Arenas at [address] requesting return call    [phone] received call from Dottie Arenas. She received updated list of Medicaid plans waiving precert and [facility] is not included, therefore they are unable to accept pt without authorization. Notified her that PT and OT notes are available.  She will start precert

## 2021-09-20 LAB
ABSOLUTE EOS #: 0.27 K/UL (ref 0–0.4)
ABSOLUTE IMMATURE GRANULOCYTE: 0.09 K/UL (ref 0–0.3)
ABSOLUTE LYMPH #: 2.91 K/UL (ref 1–4.8)
ABSOLUTE MONO #: 0.82 K/UL (ref 0.1–0.8)
ALBUMIN SERPL-MCNC: 2.7 G/DL (ref 3.5–5.2)
ALBUMIN/GLOBULIN RATIO: 0.7 (ref 1–2.5)
ALP BLD-CCNC: 103 U/L (ref 35–104)
ALT SERPL-CCNC: 5 U/L (ref 5–33)
ANION GAP SERPL CALCULATED.3IONS-SCNC: 11 MMOL/L (ref 9–17)
AST SERPL-CCNC: 12 U/L
BASOPHILS # BLD: 0 % (ref 0–2)
BASOPHILS ABSOLUTE: 0 K/UL (ref 0–0.2)
BILIRUB SERPL-MCNC: 0.18 MG/DL (ref 0.3–1.2)
BILIRUBIN DIRECT: <0.08 MG/DL
BILIRUBIN, INDIRECT: ABNORMAL MG/DL (ref 0–1)
BUN BLDV-MCNC: 9 MG/DL (ref 6–20)
BUN/CREAT BLD: ABNORMAL (ref 9–20)
CALCIUM SERPL-MCNC: 8.5 MG/DL (ref 8.6–10.4)
CHLORIDE BLD-SCNC: 106 MMOL/L (ref 98–107)
CO2: 24 MMOL/L (ref 20–31)
CREAT SERPL-MCNC: 0.79 MG/DL (ref 0.5–0.9)
CULTURE: NORMAL
DIFFERENTIAL TYPE: ABNORMAL
EOSINOPHILS RELATIVE PERCENT: 3 % (ref 1–4)
GFR AFRICAN AMERICAN: >60 ML/MIN
GFR NON-AFRICAN AMERICAN: >60 ML/MIN
GFR SERPL CREATININE-BSD FRML MDRD: ABNORMAL ML/MIN/{1.73_M2}
GFR SERPL CREATININE-BSD FRML MDRD: ABNORMAL ML/MIN/{1.73_M2}
GLOBULIN: ABNORMAL G/DL (ref 1.5–3.8)
GLUCOSE BLD-MCNC: 127 MG/DL (ref 65–105)
GLUCOSE BLD-MCNC: 132 MG/DL (ref 65–105)
GLUCOSE BLD-MCNC: 133 MG/DL (ref 70–99)
GLUCOSE BLD-MCNC: 159 MG/DL (ref 65–105)
GLUCOSE BLD-MCNC: 171 MG/DL (ref 65–105)
GLUCOSE BLD-MCNC: 210 MG/DL (ref 65–105)
HCT VFR BLD CALC: 27.7 % (ref 36.3–47.1)
HEMOGLOBIN: 7.9 G/DL (ref 11.9–15.1)
IMMATURE GRANULOCYTES: 1 %
LYMPHOCYTES # BLD: 32 % (ref 24–44)
Lab: NORMAL
MCH RBC QN AUTO: 31.7 PG (ref 25.2–33.5)
MCHC RBC AUTO-ENTMCNC: 28.5 G/DL (ref 28.4–34.8)
MCV RBC AUTO: 111.2 FL (ref 82.6–102.9)
MONOCYTES # BLD: 9 % (ref 1–7)
MORPHOLOGY: ABNORMAL
MORPHOLOGY: ABNORMAL
NRBC AUTOMATED: 0.6 PER 100 WBC
NUCLEATED RED BLOOD CELLS: 2 PER 100 WBC
PDW BLD-RTO: 14.2 % (ref 11.8–14.4)
PLATELET # BLD: 403 K/UL (ref 138–453)
PLATELET ESTIMATE: ABNORMAL
PMV BLD AUTO: 10 FL (ref 8.1–13.5)
POTASSIUM SERPL-SCNC: 4.5 MMOL/L (ref 3.7–5.3)
RBC # BLD: 2.49 M/UL (ref 3.95–5.11)
RBC # BLD: ABNORMAL 10*6/UL
SEG NEUTROPHILS: 55 % (ref 36–66)
SEGMENTED NEUTROPHILS ABSOLUTE COUNT: 5.01 K/UL (ref 1.8–7.7)
SODIUM BLD-SCNC: 141 MMOL/L (ref 135–144)
SPECIMEN DESCRIPTION: NORMAL
TOTAL PROTEIN: 6.8 G/DL (ref 6.4–8.3)
WBC # BLD: 9.1 K/UL (ref 3.5–11.3)
WBC # BLD: ABNORMAL 10*3/UL

## 2021-09-20 PROCEDURE — 80048 BASIC METABOLIC PNL TOTAL CA: CPT

## 2021-09-20 PROCEDURE — 6360000002 HC RX W HCPCS: Performed by: STUDENT IN AN ORGANIZED HEALTH CARE EDUCATION/TRAINING PROGRAM

## 2021-09-20 PROCEDURE — 1200000000 HC SEMI PRIVATE

## 2021-09-20 PROCEDURE — 82947 ASSAY GLUCOSE BLOOD QUANT: CPT

## 2021-09-20 PROCEDURE — 36415 COLL VENOUS BLD VENIPUNCTURE: CPT

## 2021-09-20 PROCEDURE — 6370000000 HC RX 637 (ALT 250 FOR IP): Performed by: PLASTIC SURGERY

## 2021-09-20 PROCEDURE — 80076 HEPATIC FUNCTION PANEL: CPT

## 2021-09-20 PROCEDURE — 85025 COMPLETE CBC W/AUTO DIFF WBC: CPT

## 2021-09-20 PROCEDURE — 99232 SBSQ HOSP IP/OBS MODERATE 35: CPT | Performed by: INTERNAL MEDICINE

## 2021-09-20 PROCEDURE — 6370000000 HC RX 637 (ALT 250 FOR IP): Performed by: INTERNAL MEDICINE

## 2021-09-20 PROCEDURE — 6370000000 HC RX 637 (ALT 250 FOR IP): Performed by: STUDENT IN AN ORGANIZED HEALTH CARE EDUCATION/TRAINING PROGRAM

## 2021-09-20 PROCEDURE — 94640 AIRWAY INHALATION TREATMENT: CPT

## 2021-09-20 PROCEDURE — 2580000003 HC RX 258: Performed by: PLASTIC SURGERY

## 2021-09-20 RX ORDER — FLUCONAZOLE 200 MG/1
400 TABLET ORAL DAILY
Qty: 42 TABLET | Refills: 0 | Status: ON HOLD | OUTPATIENT
Start: 2021-09-20 | End: 2021-10-12 | Stop reason: HOSPADM

## 2021-09-20 RX ORDER — FLUCONAZOLE 200 MG/1
400 TABLET ORAL DAILY
Status: DISCONTINUED | OUTPATIENT
Start: 2021-09-20 | End: 2021-09-24 | Stop reason: HOSPADM

## 2021-09-20 RX ADMIN — SODIUM CHLORIDE, PRESERVATIVE FREE 10 ML: 5 INJECTION INTRAVENOUS at 10:15

## 2021-09-20 RX ADMIN — FENTANYL CITRATE 25 MCG: 50 INJECTION, SOLUTION INTRAMUSCULAR; INTRAVENOUS at 21:07

## 2021-09-20 RX ADMIN — INSULIN GLARGINE 20 UNITS: 100 INJECTION, SOLUTION SUBCUTANEOUS at 10:04

## 2021-09-20 RX ADMIN — BUDESONIDE AND FORMOTEROL FUMARATE DIHYDRATE 2 PUFF: 80; 4.5 AEROSOL RESPIRATORY (INHALATION) at 08:07

## 2021-09-20 RX ADMIN — FENTANYL CITRATE 25 MCG: 50 INJECTION, SOLUTION INTRAMUSCULAR; INTRAVENOUS at 05:35

## 2021-09-20 RX ADMIN — OXYCODONE HYDROCHLORIDE 5 MG: 5 TABLET ORAL at 18:50

## 2021-09-20 RX ADMIN — VENLAFAXINE HYDROCHLORIDE 150 MG: 150 CAPSULE, EXTENDED RELEASE ORAL at 10:03

## 2021-09-20 RX ADMIN — SODIUM CHLORIDE, PRESERVATIVE FREE 10 ML: 5 INJECTION INTRAVENOUS at 20:45

## 2021-09-20 RX ADMIN — OXYCODONE HYDROCHLORIDE 5 MG: 5 TABLET ORAL at 02:04

## 2021-09-20 RX ADMIN — INSULIN LISPRO 2 UNITS: 100 INJECTION, SOLUTION INTRAVENOUS; SUBCUTANEOUS at 17:53

## 2021-09-20 RX ADMIN — AMOXICILLIN 500 MG: 500 CAPSULE ORAL at 20:59

## 2021-09-20 RX ADMIN — FLUCONAZOLE 400 MG: 200 TABLET ORAL at 14:07

## 2021-09-20 RX ADMIN — OXYCODONE HYDROCHLORIDE 5 MG: 5 TABLET ORAL at 10:36

## 2021-09-20 RX ADMIN — VORICONAZOLE 200 MG: 200 TABLET ORAL at 10:04

## 2021-09-20 RX ADMIN — INSULIN LISPRO 2 UNITS: 100 INJECTION, SOLUTION INTRAVENOUS; SUBCUTANEOUS at 13:06

## 2021-09-20 RX ADMIN — PREGABALIN 75 MG: 75 CAPSULE ORAL at 20:44

## 2021-09-20 RX ADMIN — ENOXAPARIN SODIUM 40 MG: 40 INJECTION SUBCUTANEOUS at 10:15

## 2021-09-20 RX ADMIN — AMOXICILLIN 500 MG: 500 CAPSULE ORAL at 06:10

## 2021-09-20 RX ADMIN — AMOXICILLIN 500 MG: 500 CAPSULE ORAL at 14:07

## 2021-09-20 RX ADMIN — PANTOPRAZOLE SODIUM 20 MG: 20 TABLET, DELAYED RELEASE ORAL at 10:04

## 2021-09-20 RX ADMIN — INSULIN GLARGINE 20 UNITS: 100 INJECTION, SOLUTION SUBCUTANEOUS at 20:56

## 2021-09-20 RX ADMIN — OXYCODONE HYDROCHLORIDE 5 MG: 5 TABLET ORAL at 14:47

## 2021-09-20 RX ADMIN — ENOXAPARIN SODIUM 40 MG: 40 INJECTION SUBCUTANEOUS at 20:59

## 2021-09-20 RX ADMIN — PREGABALIN 75 MG: 75 CAPSULE ORAL at 10:04

## 2021-09-20 RX ADMIN — ACETAMINOPHEN 650 MG: 325 TABLET ORAL at 06:01

## 2021-09-20 RX ADMIN — OXYCODONE HYDROCHLORIDE 5 MG: 5 TABLET ORAL at 06:01

## 2021-09-20 RX ADMIN — TRAZODONE HYDROCHLORIDE 150 MG: 100 TABLET ORAL at 20:44

## 2021-09-20 ASSESSMENT — ENCOUNTER SYMPTOMS
APNEA: 0
COLOR CHANGE: 1
EYE REDNESS: 0
SHORTNESS OF BREATH: 0
PHOTOPHOBIA: 0
ABDOMINAL DISTENTION: 0
COUGH: 0

## 2021-09-20 ASSESSMENT — PAIN SCALES - GENERAL
PAINLEVEL_OUTOF10: 0
PAINLEVEL_OUTOF10: 6
PAINLEVEL_OUTOF10: 10
PAINLEVEL_OUTOF10: 9
PAINLEVEL_OUTOF10: 10
PAINLEVEL_OUTOF10: 10
PAINLEVEL_OUTOF10: 9
PAINLEVEL_OUTOF10: 10

## 2021-09-20 NOTE — PROGRESS NOTES
[facility] -   Infectious diseases evaluation  admission date 9/14/2021    reason for consultation:   Hand infection    Impression :   Current:  · DKA  · DM on insulin  · SLE  · Depressed  · Right 2nd finger infected soft tissues  ·  and open ulcer w drain age x 2 weeks pTA  · Right hand cellultiis    Other:  ·   Discussion / summary of stay / plan of care   ·   Recommendations   · Avoid antibiotics that would affect the kidneys and the antibiotic was DKA  · Stop zosyn and start amoxicillin  · fluconazole 9/16 - 9/17  · Start 9/17 Vfend and ask for preauth  · DC in am w Vfend or fluconazole according to ID of the janna  · Will require 2-3 weeks AB for soft tissue, tendon involvement  · Finger at risk for amputation - disc w pt  reconsiled w vfend    Infection Control Recommendations   · [place] Precautions  · Contact Isolation       Antimicrobial Stewardship Recommendations   · Simplification of therapy  · Targeted therapy  · Per Kg dosing      Coordination ofOutpatient Care:   · Estimated Length of IV antimicrobials:  · Patient will need Midline / picc Catheter Insertion:   · Patient will need SNF:  · Patient will need outpatient wound care:     History of Present Illness:   Initial history:  Venita Bland is a 49y.o.-year-old female   Presented with right second finger pain and progressive discoloration found to have an abscess over the right second finger associated with some redness over the palm in the same hand, increased pain, patient presented with DKA, area was lanced and packed, patient seen for evaluation of antibiotics  She is emotional, depressed, lost her son her daughter and her  to the short period of time. Interval changes  9/20/2021   No data found.   Finger evaluated and sutures still in pl;ace - area macerated and deep exposure of the volar index - had still red and inflamed under the index    Pain_  Lab to ID the candida in am    Chest x-ray negative, WBC 8  Drug screen positive for cocaine and benzodiazepine  Creatinine 1.09     9/20  Patient examined at bedside  Afebrile   Vitals stable  WBC: 10.2  BC: neg  Wound Cx normal randi  Drug screen positive for cocaine and benzodiazepine  Amoxicillin 9/17  Voriconazole 9/17 for candida glabrata on Wound Cx    Summary of relevant labs:  Labs:  WBC 7  Creat normal    Micro:  BC  Wound cx GPR  OR cx yeast,candida non albicans and not dublinensis  Imaging:  R hand xray 9/14/21  Marked soft tissue swelling involving the 2nd finger and dorsum of the hand. No acute osseous abnormality evident       I have personally reviewed the past medical history, past surgical history, medications, social history, and family history, and I haveupdated the database accordingly. Allergies:   Bactrim [sulfamethoxazole-trimethoprim] and Adhesive tape     Review of Systems:     Review of Systems   Constitutional: Positive for fatigue. Negative for activity change. HENT: Negative for congestion. Eyes: Negative for photophobia, redness and visual disturbance. Respiratory: Negative for apnea, cough and shortness of breath. Cardiovascular: Negative for chest pain. Gastrointestinal: Negative for abdominal distention. Endocrine: Negative for polyphagia and polyuria. Genitourinary: Negative for dysuria, flank pain, frequency and urgency. Musculoskeletal: Positive for arthralgias and myalgias. Skin: Positive for color change and wound. Allergic/Immunologic: Negative for immunocompromised state. Neurological: Negative for dizziness, seizures, light-headedness and numbness. Hematological: Negative for adenopathy. Psychiatric/Behavioral: Negative for agitation. Physical Examination :       Physical Exam  Constitutional:       Appearance: Normal appearance. She is not ill-appearing, toxic-appearing or diaphoretic. HENT:      Head: Normocephalic and atraumatic.       Nose: Nose normal.      Mouth/Throat:      Mouth: Mucous membranes are moist.   Eyes:      General: No scleral icterus. Conjunctiva/sclera: Conjunctivae normal.      Pupils: Pupils are equal, round, and reactive to light. Cardiovascular:      Rate and Rhythm: Normal rate and regular rhythm. Heart sounds: Normal heart sounds. No murmur heard. Pulmonary:      Effort: No respiratory distress. Breath sounds: Normal breath sounds. No rhonchi. Abdominal:      General: There is no distension. Palpations: Abdomen is soft. Tenderness: There is no abdominal tenderness. Genitourinary:     Comments: No cullen  Musculoskeletal:         General: Deformity present. No swelling, tenderness or signs of injury. Cervical back: Neck supple. No rigidity or tenderness. Skin:     General: Skin is dry. Coloration: Skin is not jaundiced. Findings: Erythema and lesion present. Neurological:      Mental Status: She is alert and oriented to person, place, and time. Cranial Nerves: No cranial nerve deficit. Psychiatric:         Mood and Affect: Mood normal.         Thought Content:  Thought content normal.         Past Medical History:     Past Medical History:   Diagnosis Date    Acid reflux 5/29/2017    Acute cystitis with hematuria 10/11/2016    Acute non-recurrent maxillary sinusitis 11/28/2017    Asthma     Bipolar 1 disorder ([address] 1/4/2018    Bipolar disorder, mixed ([address]     BMI 34.0-34.9,adult 11/28/2017    Cannabis use disorder, severe, dependence ([address] 12/19/2017    Cerebrovascular accident (CVA) ([address] 6/14/2017    Chest pain 11/5/2016    Chronic renal insufficiency     Cocaine abuse ([address] 1/5/2018    COVID-19 virus RNA test result unknown     DDD (degenerative disc disease), cervical     Diabetes mellitus ([address]     Dizziness 6/13/2017    Fibromyalgia     History of stroke 9/9/2017    Homicidal ideation 11/6/2017    Hyperglycemia     Hypertension     Hypotension 1/18/2019    IDDM (insulin dependent diabetes mellitus) 12/21/2015    Lupus ([address]     Migraine     Neuropathy     Neuropathy     Polysubstance abuse ([address] 9/17/2017    Post traumatic stress disorder (PTSD)     Posttraumatic stress disorder 5/29/2017    Recurrent depression ([address] 5/29/2017    Recurrent major depressive disorder, in partial remission ([address] 11/28/2017    Screening mammogram, encounter for 11/28/2017    Severe recurrent major depression with psychotic features ([address] 12/19/2017    Severe recurrent major depression without psychotic features ([address] 12/19/2017    Stroke (cerebrum) ([address] 6/14/2017    Stroke ([facility]     per chart notes    Suicidal ideation     Suicidal intent 3/10/2017    Vitamin D deficiency 11/28/2017    White matter changes 6/13/2017       Past Surgical  History:     Past Surgical History:   Procedure Laterality Date    ABDOMEN SURGERY      drain tube    ABSCESS DRAINAGE      right buttock    CATARACT REMOVAL WITH IMPLANT Bilateral     CHEST TUBE INSERTION      FINGER AMPUTATION Left 03/13/2021    LEFT RING FINER AMPUTATION performed by Jens Duran MD at [address] [facility] Right 09/15/2021     I&D INDEX FINGER     HAND SURGERY Right 09/14/2021    I&D INDEX FINGER [facility] Right 9/15/2021    I&D INDEX FINGER performed by Jens Duran MD at [facility] Bilateral        Medications:      insulin glargine  20 Units SubCUTAneous BID    enoxaparin  40 mg SubCUTAneous Daily    voriconazole  200 mg Oral 2 times per day    amoxicillin  500 mg Oral 3 times per day    insulin lispro  0-12 Units SubCUTAneous TID WC    insulin lispro  0-6 Units SubCUTAneous Nightly    sodium chloride  1,000 mL IntraVENous Once    budesonide-formoterol  2 puff Inhalation BID    pantoprazole  20 mg Oral BID AC    traZODone  150 mg Oral Nightly    pregabalin  75 mg Oral BID    venlafaxine  150 mg Oral Daily with breakfast    sodium chloride flush  5-40 mL IntraVENous 2 times per day       Social History:     Social History     Socioeconomic History    Marital status: Legally      Spouse name: Not on file    Number of children: Not on file    Years of education: Not on file    Highest education level: Not on file   Occupational History    Not on file   Tobacco Use    Smoking status: Never Smoker    Smokeless tobacco: Never Used   Vaping Use    Vaping Use: Never used   Substance and Sexual Activity    Alcohol use: Not Currently    Drug use: Yes     Types: Marijuana, Cocaine     Comment: + Cocaine 2/2021 also, see Care Everywhere UDS    Sexual activity: Not Currently     Partners: Male   Other Topics Concern    Not on file   Social History Narrative    Not on file     Social Determinants of Health     Financial Resource Strain: High Risk    Difficulty of Paying Living Expenses: Hard   Food Insecurity: Food Insecurity Present    Worried About Running Out of Food in the Last Year: Often true    Kelin of Food in the Last Year: Often true   Transportation Needs: Unmet Transportation Needs    Lack of Transportation (Medical):  Yes    Lack of Transportation (Non-Medical): Yes   Physical Activity: Inactive    Days of Exercise per Week: 0 days    Minutes of Exercise per Session: 0 min   Stress: Stress Concern Present    Feeling of Stress : Rather much   Social Connections:     Frequency of Communication with Friends and Family:     Frequency of Social Gatherings with Friends and Family:     Attends Ep[employer]op[employer] Services:     Active Member of Clubs or Organizations:     Attends Club or Organization Meetings:     Marital Status:    Intimate Partner Violence:     Fear of Current or Ex-Partner:     Emotionally Abused:     Physically Abused:     Sexually Abused:        Family History:     Family History   Problem Relation Age of Onset    Diabetes Mother     Stroke Mother     Diabetes Father  Diabetes Sister     Diabetes Brother     Other Son         GSW    No Known Problems Sister       Medical Decision Making:   I have independently reviewed/ordered the following labs:    CBC with Differential:   Recent Labs     09/19/21  0747   WBC 10.2   HGB 8.7*   HCT 28.7*      LYMPHOPCT 26   MONOPCT 12     BMP:  Recent Labs     09/19/21  0747      K 4.2      CO2 25   BUN 7   CREATININE 0.74     Hepatic Function Panel:   No results for input(s): PROT, LABALBU, BILIDIR, IBILI, BILITOT, ALKPHOS, ALT, AST in the last 72 hours. No results for input(s): RPR in the last 72 hours. No results for input(s): HIV in the last 72 hours. No results for input(s): BC in the last 72 hours. Lab Results   Component Value Date    CREATININE 0.74 09/19/2021    GLUCOSE 74 09/19/2021       Detailed results: Thank you for allowing us to participate in the care of this patient. Please call with questions. This note is created with the assistance of a speech recognition program.  While intending to generate adocument that actually reflects the content of the visit, the document can still have some errors including those of syntax and sound a like substitutions which may escape proof reading. It such instances, actual meaningcan be extrapolated by contextual diversion.     Radha Slater MD  Office: [phone]  Perfect serve / office [phone]

## 2021-09-20 NOTE — CARE COORDINATION
Transitional planning-called Yael Simon at [employer] and left message. Call from Yael Simon at [address] [facility] with [employer]. Talked with patient-she is wanting [facility]. Called Stu Dayana will send needed information to [facility]. 0930 call from [facility] unable to accept-patient not vaccinated. Gave list to patient to pick new place.     1630 went to see patient-hasn't made a decision on SNF yet

## 2021-09-20 NOTE — CARE COORDINATION
Copy of Waiver received from [address] [place] [address] [facility] that indicates prior authorization has been lifted faxed to [facility]. Anticipate facility to accept patient today, as pre-cert waived.

## 2021-09-20 NOTE — PROGRESS NOTES
[facility]  Internal Medicine Teaching Residency Program  Inpatient Daily Progress Note  ______________________________________________________________________________    Patient: Marco A Reyes  YOB: 1965   CQO:[id number]    Acct: [de-identified]     Room: 2023/2023-01  Admit date: 9/14/2021  Today's date: 09/20/21  Number of days in the hospital: 6    SUBJECTIVE   Admitting Diagnosis: DKA (diabetic ketoacidoses) ([facility] [address]  CC: Right index finger pain    Pt examined at bedside. Chart & results reviewed. No acute events overnight. Patient remained afebrile and hemodynamically stable. She reported improvement with finger pain. She denies shortness of breath, chest pain, nausea, abdominal pain, dysuria.   - On Fentanyl 25 mcg IV- for dressing changes TID.  - ID following\" Continue Amoxicillin and Voriconazole  - Awaiting placement to SNF due to TID dressing change needs  Labs reviewed: wnl  Blood sugar: 132<--188<--200<--205; on Lantus 20 units BID; medium dose sliding scale    ROS:  Constitutional:  negative for chills, fevers, sweats  Respiratory:  negative for cough, dyspnea on exertion, hemoptysis, shortness of breath, wheezing  Cardiovascular:  negative for chest pain, chest pressure/discomfort, lower extremity edema, palpitations  Gastrointestinal:  negative for abdominal pain, constipation, diarrhea, nausea, vomiting  Neurological:  negative for dizziness, headache    BRIEF HISTORY     The patient is a pleasant 56 y. o. female presents with a chief complaint of right index finger pain. The patient has PMH significant for DM-2, HTN, Bipolar disorder, and polysubstance abuse. The patient reported that she had a fall yesterday and injured her right index finger and since then has had swelling and pain. She denies hitting her head or loss of consciousness. She denies urine or fecal incontinence.  She denies neck pain, chest pain, shortness of breath, abdominal pain. The patient was tearful during the interview and was unable to provide a detailed history. She denies intention of self-harm or suicidal ideation. Plastic surgery was consulted and patient was taken to OR for exploration and drainage of right index finger abscess.      In the ED, the patient was afebrile Temp 98.5F, RR 18, HR 98, /123, SpO2 95%. Labs reviewed: Na 126, K 4.9, bicarb 16, BUN 11, Cr 1.08, Glucose 719, beta-hydroxy 4.42, WBC 9.4, Hgb 10.7, Plt 59, ESR 69, Blood gas: pH 7.283/ pCO2 53.7/ pO2 20.1/ HCO3 24.6Covid negative, wound culture shows gram negative rods. The patient received 10 units of insulin in the ED and was started on DKA protocol with insulin gtt and NS IVF. She was started on Vanc and Zosyn.     OBJECTIVE     Vital Signs:  /73   Pulse 92   Temp 98.7 °F (37.1 °C)   Resp 19   Ht 5' 6\" (1.676 m)   Wt 238 lb 9.6 oz (108.2 kg)   LMP  (LMP Unknown)   SpO2 96%   BMI 38.51 kg/m²     No data recorded. In: 240   Out: -     Physical Exam:  Constitutional: This is a well developed, well nourished, 35-39.9 - Obesity Grade II 49y.o. year old female who is alert, oriented, cooperative and in no apparent distress. Head:normocephalic and atraumatic. EENT:  PERRLA. No conjunctival injections. Septum was midline, mucosa was without erythema, exudates or cobblestoning. No thrush was noted. Neck: Supple without thyromegaly. No elevated JVP. Trachea was midline. Respiratory: Chest was symmetrical without dullness to percussion. Breath sounds bilaterally were clear to auscultation. There were no wheezes, rhonchi or rales. There is no intercostal retraction or use of accessory muscles. No egophony noted. Cardiovascular: Regular without murmur, clicks, gallops or rubs. Abdomen: Slightly rounded and soft without organomegaly. No rebound, rigidity or guarding was appreciated. Lymphatic: No lymphadenopathy. Musculoskeletal: Normal curvature of the spine.   No gross muscle weakness. Extremities:  No lower extremity edema, ulcerations, tenderness, varicosities or erythema. Muscle size, tone and strength are normal.  No involuntary movements are noted. Skin:  Warm and dry. Good color, turgor and pigmentation. No lesions or scars.   No cyanosis or clubbing  Neurological/Psychiatric: The patient's general behavior, level of consciousness, thought content and emotional status is normal.        Medications:  Scheduled Medications:    insulin glargine  20 Units SubCUTAneous BID    enoxaparin  40 mg SubCUTAneous Daily    voriconazole  200 mg Oral 2 times per day    amoxicillin  500 mg Oral 3 times per day    insulin lispro  0-12 Units SubCUTAneous TID WC    insulin lispro  0-6 Units SubCUTAneous Nightly    sodium chloride  1,000 mL IntraVENous Once    budesonide-formoterol  2 puff Inhalation BID    pantoprazole  20 mg Oral BID AC    traZODone  150 mg Oral Nightly    pregabalin  75 mg Oral BID    venlafaxine  150 mg Oral Daily with breakfast    sodium chloride flush  5-40 mL IntraVENous 2 times per day     Continuous Infusions:    sodium chloride Stopped (09/18/21 1900)    dextrose      sodium chloride       PRN MedicationsfentanNYL, 25 mcg, PRN  glucose, 15 g, PRN  dextrose, 12.5 g, PRN  glucagon (rDNA), 1 mg, PRN  dextrose, 100 mL/hr, PRN  albuterol sulfate HFA, 2 puff, Q4H PRN  sodium chloride flush, 5-40 mL, PRN  sodium chloride, 25 mL, PRN  ondansetron, 4 mg, Q8H PRN   Or  ondansetron, 4 mg, Q6H PRN  polyethylene glycol, 17 g, Daily PRN  acetaminophen, 650 mg, Q6H PRN   Or  acetaminophen, 650 mg, Q6H PRN  dextrose, 12.5 g, PRN  oxyCODONE, 5 mg, Q4H PRN        Diagnostic Labs:  CBC:   Recent Labs     09/19/21  0747   WBC 10.2   RBC 2.73*   HGB 8.7*   HCT 28.7*   .1*   RDW 13.9        BMP:   Recent Labs     09/19/21  0747      K 4.2      CO2 25   BUN 7   CREATININE 0.74     BNP: No results for input(s): BNP in the last 72 hours.  PT/INR: No results for input(s): PROTIME, INR in the last 72 hours. APTT: No results for input(s): APTT in the last 72 hours. CARDIAC ENZYMES: No results for input(s): CKMB, CKMBINDEX, TROPONINI in the last 72 hours. Invalid input(s): CKTOTAL;3  FASTING LIPID PANEL:  Lab Results   Component Value Date    CHOL 275 (H) 12/30/2020    HDL 80 12/30/2020    TRIG 246 (H) 12/30/2020     LIVER PROFILE: No results for input(s): AST, ALT, ALB, BILIDIR, BILITOT, ALKPHOS in the last 72 hours. MICROBIOLOGY:   Lab Results   Component Value Date/Time    CULTURE NO GROWTH 6 DAYS 09/14/2021 03:06 PM       Imaging:    XR HAND RIGHT (MIN 3 VIEWS)    Result Date: 9/14/2021  Marked soft tissue swelling involving the 2nd finger and dorsum of the hand. No acute osseous abnormality evident. CT HEAD WO CONTRAST    Result Date: 9/15/2021  1. No acute intracranial abnormality. 2.  White matter hypoattenuation described is typical of microvascular ischemic disease or as sequela of dysmyelinating/demyelinating processes. 3.  Vascular calcifications are noted reflecting calcific atherosclerosis. US RENAL COMPLETE    Result Date: 9/17/2021  Unremarkable ultrasound of the kidneys and urinary bladder. Cholelithiasis. XR CHEST PORTABLE    Result Date: 9/15/2021  Remote granulomatous disease No acute cardiopulmonary findings       ASSESSMENT & PLAN     ASSESSMENT / PLAN:     Principal Problem:    DKA (diabetic ketoacidoses) (HCC)  Active Problems:    Flexor tenosynovitis of finger    Bipolar disorder, mixed ([address]    Polysubstance abuse ([address]  Resolved Problems:    * No resolved hospital problems. *    Tenosynovitis Right index finger  - s/p drainage of abscess per plastics surgery  - Plastic surgery following- Flexor tendon distal to PIP was necrotic and debrided so DIP will not be able to flex if finger survives. Volar skin between PIP and IP was also necrotic. Discussed option for distal finger amputation vs. Wound care. Patient chose to continue with TID dressing changes. Okay to discharge home with dressing changes when ID feels infection is under control and follow-up in [facility]   - Received 1 dose of Vancomycin and TDAP vaccine shot  - Wound and Abscess culture: many gram positive rods and yeast light growth  - Plastic surgery following: flexor tendon distal to PIP debridement yesterday.  DIP not able to flex the fingers survives.  2 options were discussed with the patient to salvage the finger that will have limited motion.  Patient shows 2 to 3 times daily dressing changes instead of amputation.  Per plastics patient can be discharged when ID feels infection is under control and follow-up with [facility]  - ID following: Continue Amoxicillin 500 mg TID POand Voriconazole 200 mg BID PO.   - Wound culture: gram positive rods, yeast- not candida albicans or candida dubliniensis light growth        DKA- resolved; bridged to Lantus  - Glucose 719, beta-hydroxy 4.42, on admission   - blood gas: pH 7.283/ pCO2 53.7/ pO2 20.1/ HCO3 24.6  - Patient received 10 units insulin and started on DKA protocol with insulin gtt and IVF in the ED  - HbA1c 14. 2- non compliant with insulin at home  - Blood sugar: 132<--188<--200<--205; on Lantus 20 units BID; medium dose sliding scale  - Hypoglycemia protocol  - Diabetic diet- patient tolerating diet well.            Polysubstance abuse  - Urine tox: positive for benzodiazepine, cocaine and opiates  - Avoid beta blockers  - Monitor for withdrawal symptoms  - Monitor BP closely  - erratic behavior overnight, patient wanted to leave AMA, refused lab [id number] [facility] 09/15  - Psychiatry consulted: low risk of suicide and harm to others, no indicate for sitter admission to psychiatry.  Recommend outpatient follow-up         HTN- controlled  - Controlled 120s/80s; HR 80s  - Monitor BP; avoid beta-blockers due to drug abuse history        Thrombocytopenia- Plt 403- resolved  - secondary to infection vs. Drug abuse  - Continue to monitor        MARTIN- Cr 0.79<--1.23 (0.8~0.9) - resolved  - Discontinued IVF  - Continue to monitor  - US Renal: unremarkable kidneys and urinary bladder           DVT ppx : Heparin sq- patient refused heparin shot today  GI ppx: Protonix     PT/OT: On board- updated notes on 09/19  Discharge Planning / Cam Melany for SNF provided due to dressing change needs TID; cannot go to [address] [place] [address] due to unvaccinated status         Felisha Alexander MD  Internal Medicine Resident, PGY-1  [address] [place], [place]  9/20/2021, 8:23 AM

## 2021-09-20 NOTE — PROGRESS NOTES
chills  Right hand pain ++ and pt re considering possible early surg to thew right index  Cx w candida glabrata -from the finger    Chest x-ray negative, WBC 8  Drug screen positive for cocaine and benzodiazepine  Creatinine 1.09      Summary of relevant labs:  Labs:  WBC 7  Creat normal    Micro:  BC  Wound cx GPR normal randi  OR cx  canddia glabrata  Imaging:  R hand xray 9/14/21  Marked soft tissue swelling involving the 2nd finger and dorsum of the hand. No acute osseous abnormality evident       I have personally reviewed the past medical history, past surgical history, medications, social history, and family history, and I haveupdated the database accordingly. Allergies:   Bactrim [sulfamethoxazole-trimethoprim] and Adhesive tape     Review of Systems:     Review of Systems   Constitutional: Negative for activity change and fatigue. HENT: Negative for congestion. Eyes: Negative for photophobia, redness and visual disturbance. Respiratory: Negative for apnea, cough and shortness of breath. Cardiovascular: Negative for chest pain. Gastrointestinal: Negative for abdominal distention. Endocrine: Negative for polyphagia and polyuria. Genitourinary: Negative for dysuria, flank pain, frequency and urgency. Musculoskeletal: Positive for arthralgias. Skin: Positive for color change and wound. Allergic/Immunologic: Negative for immunocompromised state. Neurological: Negative for dizziness, tremors, seizures, syncope, light-headedness and numbness. Hematological: Negative for adenopathy. Psychiatric/Behavioral: Negative for agitation. Physical Examination :       Physical Exam  Constitutional:       Appearance: Normal appearance. She is not ill-appearing, toxic-appearing or diaphoretic. HENT:      Head: Normocephalic and atraumatic. Nose: Nose normal.      Mouth/Throat:      Mouth: Mucous membranes are moist.   Eyes:      General: No scleral icterus.      Conjunctiva/sclera: Conjunctivae normal.      Pupils: Pupils are equal, round, and reactive to light. Cardiovascular:      Rate and Rhythm: Normal rate and regular rhythm. Heart sounds: Normal heart sounds. No murmur heard. Pulmonary:      Effort: No respiratory distress. Breath sounds: Normal breath sounds. No rhonchi. Abdominal:      General: There is no distension. Palpations: Abdomen is soft. Tenderness: There is no abdominal tenderness. Genitourinary:     Comments: No cullen  Musculoskeletal:         General: Deformity present. No swelling, tenderness or signs of injury. Cervical back: Neck supple. No rigidity or tenderness. Right lower leg: No edema. Left lower leg: No edema. Skin:     General: Skin is dry. Coloration: Skin is not jaundiced. Findings: Erythema and lesion present. Neurological:      Mental Status: She is alert and oriented to person, place, and time. Cranial Nerves: No cranial nerve deficit. Psychiatric:         Mood and Affect: Mood normal.         Thought Content:  Thought content normal.         Past Medical History:     Past Medical History:   Diagnosis Date    Acid reflux 5/29/2017    Acute cystitis with hematuria 10/11/2016    Acute non-recurrent maxillary sinusitis 11/28/2017    Asthma     Bipolar 1 disorder ([address] 1/4/2018    Bipolar disorder, mixed ([address]     BMI 34.0-34.9,adult 11/28/2017    Cannabis use disorder, severe, dependence ([address] 12/19/2017    Cerebrovascular accident (CVA) ([address] 6/14/2017    Chest pain 11/5/2016    Chronic renal insufficiency     Cocaine abuse ([address] 1/5/2018    COVID-19 virus RNA test result unknown     DDD (degenerative disc disease), cervical     Diabetes mellitus ([address]     Dizziness 6/13/2017    Fibromyalgia     History of stroke 9/9/2017    Homicidal ideation 11/6/2017    Hyperglycemia     Hypertension     Hypotension 1/18/2019    IDDM (insulin dependent diabetes mellitus) 12/21/2015    Lupus ([facility] [address]     Migraine     Neuropathy     Neuropathy     Polysubstance abuse ([address] 9/17/2017    Post traumatic stress disorder (PTSD)     Posttraumatic stress disorder 5/29/2017    Recurrent depression ([address] 5/29/2017    Recurrent major depressive disorder, in partial remission ([address] 11/28/2017    Screening mammogram, encounter for 11/28/2017    Severe recurrent major depression with psychotic features ([address] 12/19/2017    Severe recurrent major depression without psychotic features ([address] 12/19/2017    Stroke (cerebrum) ([facility] [address] 6/14/2017    Stroke ([facility] [address]     per chart notes    Suicidal ideation     Suicidal intent 3/10/2017    Vitamin D deficiency 11/28/2017    White matter changes 6/13/2017       Past Surgical  History:     Past Surgical History:   Procedure Laterality Date    ABDOMEN SURGERY      drain tube    ABSCESS DRAINAGE      right buttock    CATARACT REMOVAL WITH IMPLANT Bilateral     CHEST TUBE INSERTION      FINGER AMPUTATION Left 03/13/2021    LEFT RING FINER AMPUTATION performed by Geni Kingsley MD at [address] Right 09/15/2021     I&D INDEX FINGER     HAND SURGERY Right 09/14/2021    I&D INDEX [facility] [address] [facility] 9/[facility]/2021    I&D INDEX FINGER performed by Geni Kingsley MD at [facility] Bilateral        Medications:      fluconazole  400 mg Oral Daily    insulin glargine  20 Units SubCUTAneous BID    enoxaparin  40 mg SubCUTAneous Daily    amoxicillin  500 mg Oral 3 times per day    insulin lispro  0-12 Units SubCUTAneous TID WC    insulin lispro  0-6 Units SubCUTAneous Nightly    sodium chloride  1,000 mL IntraVENous Once    budesonide-formoterol  2 puff Inhalation BID    pantoprazole  20 mg Oral BID AC    traZODone  150 mg Oral Nightly    pregabalin  75 mg Oral BID    venlafaxine  150 mg Oral Daily with breakfast    sodium chloride flush  5-40 mL IntraVENous 2 times per day Social History:     Social History     Socioeconomic History    Marital status: Legally      Spouse name: Not on file    Number of children: Not on file    Years of education: Not on file    Highest education level: Not on file   Occupational History    Not on file   Tobacco Use    Smoking status: Never Smoker    Smokeless tobacco: Never Used   Vaping Use    Vaping Use: Never used   Substance and Sexual Activity    Alcohol use: Not Currently    Drug use: Yes     Types: Marijuana, Cocaine     Comment: + Cocaine 2/2021 also, see Care Everywhere UDS    Sexual activity: Not Currently     Partners: Male   Other Topics Concern    Not on file   Social History Narrative    Not on file     Social Determinants of Health     Financial Resource Strain: High Risk    Difficulty of Paying Living Expenses: Hard   Food Insecurity: Food Insecurity Present    Worried About Running Out of Food in the Last Year: Often true    Kelin of Food in the Last Year: Often true   Transportation Needs: Unmet Transportation Needs    Lack of Transportation (Medical):  Yes    Lack of Transportation (Non-Medical): Yes   Physical Activity: Inactive    Days of Exercise per Week: 0 days    Minutes of Exercise per Session: 0 min   Stress: Stress Concern Present    Feeling of Stress : Rather much   Social Connections:     Frequency of Communication with Friends and Family:     Frequency of Social Gatherings with Friends and Family:     Attends [employer] Services:     Active Member of Clubs or Organizations:     Attends Club or Organization Meetings:     Marital Status:    Intimate Partner Violence:     Fear of Current or Ex-Partner:     Emotionally Abused:     Physically Abused:     Sexually Abused:        Family History:     Family History   Problem Relation Age of Onset    Diabetes Mother     Stroke Mother     Diabetes Father     Diabetes Sister     Diabetes Brother     Other Son         GSW    No Known Problems Sister       Medical Decision Making:   I have independently reviewed/ordered the following labs:    CBC with Differential:   Recent Labs     09/19/21  0747 09/20/21  1029   WBC 10.2 9.1   HGB 8.7* 7.9*   HCT 28.7* 27.7*    403   LYMPHOPCT 26 32   MONOPCT 12 9*     BMP:  Recent Labs     09/19/21  0747 09/20/21  1029    141   K 4.2 4.5    106   CO2 25 24   BUN 7 9   CREATININE 0.74 0.79     Hepatic Function Panel:   No results for input(s): PROT, LABALBU, BILIDIR, IBILI, BILITOT, ALKPHOS, ALT, AST in the last 72 hours. No results for input(s): RPR in the last 72 hours. No results for input(s): HIV in the last 72 hours. No results for input(s): BC in the last 72 hours. Lab Results   Component Value Date    CREATININE 0.79 09/20/2021    GLUCOSE 133 09/20/2021       Detailed results: Thank you for allowing us to participate in the care of this patient. Please call with questions. This note is created with the assistance of a speech recognition program.  While intending to generate adocument that actually reflects the content of the visit, the document can still have some errors including those of syntax and sound a like substitutions which may escape proof reading. It such instances, actual meaningcan be extrapolated by contextual diversion.     Tristan Reese MD  Office: [phone]  Perfect serve / office [phone]

## 2021-09-21 LAB
ABSOLUTE EOS #: 0.15 K/UL (ref 0–0.44)
ABSOLUTE IMMATURE GRANULOCYTE: 0.03 K/UL (ref 0–0.3)
ABSOLUTE LYMPH #: 2.15 K/UL (ref 1.1–3.7)
ABSOLUTE MONO #: 0.82 K/UL (ref 0.1–1.2)
ANION GAP SERPL CALCULATED.3IONS-SCNC: 11 MMOL/L (ref 9–17)
BASOPHILS # BLD: 0 % (ref 0–2)
BASOPHILS ABSOLUTE: 0.03 K/UL (ref 0–0.2)
BUN BLDV-MCNC: 8 MG/DL (ref 6–20)
BUN/CREAT BLD: ABNORMAL (ref 9–20)
CALCIUM SERPL-MCNC: 8.9 MG/DL (ref 8.6–10.4)
CHLORIDE BLD-SCNC: 104 MMOL/L (ref 98–107)
CO2: 25 MMOL/L (ref 20–31)
CREAT SERPL-MCNC: 0.88 MG/DL (ref 0.5–0.9)
DIFFERENTIAL TYPE: ABNORMAL
EOSINOPHILS RELATIVE PERCENT: 2 % (ref 1–4)
GFR AFRICAN AMERICAN: >60 ML/MIN
GFR NON-AFRICAN AMERICAN: >60 ML/MIN
GFR SERPL CREATININE-BSD FRML MDRD: ABNORMAL ML/MIN/{1.73_M2}
GFR SERPL CREATININE-BSD FRML MDRD: ABNORMAL ML/MIN/{1.73_M2}
GLUCOSE BLD-MCNC: 127 MG/DL (ref 65–105)
GLUCOSE BLD-MCNC: 159 MG/DL (ref 65–105)
GLUCOSE BLD-MCNC: 160 MG/DL (ref 65–105)
GLUCOSE BLD-MCNC: 187 MG/DL (ref 70–99)
GLUCOSE BLD-MCNC: 212 MG/DL (ref 65–105)
HCT VFR BLD CALC: 27.2 % (ref 36.3–47.1)
HEMOGLOBIN: 8.1 G/DL (ref 11.9–15.1)
IMMATURE GRANULOCYTES: 0 %
LYMPHOCYTES # BLD: 30 % (ref 24–43)
MCH RBC QN AUTO: 31.6 PG (ref 25.2–33.5)
MCHC RBC AUTO-ENTMCNC: 29.8 G/DL (ref 28.4–34.8)
MCV RBC AUTO: 106.3 FL (ref 82.6–102.9)
MONOCYTES # BLD: 11 % (ref 3–12)
NRBC AUTOMATED: 0.3 PER 100 WBC
PDW BLD-RTO: 14.2 % (ref 11.8–14.4)
PLATELET # BLD: 432 K/UL (ref 138–453)
PLATELET ESTIMATE: ABNORMAL
PMV BLD AUTO: 10.2 FL (ref 8.1–13.5)
POTASSIUM SERPL-SCNC: 4.1 MMOL/L (ref 3.7–5.3)
RBC # BLD: 2.56 M/UL (ref 3.95–5.11)
RBC # BLD: ABNORMAL 10*6/UL
SEG NEUTROPHILS: 57 % (ref 36–65)
SEGMENTED NEUTROPHILS ABSOLUTE COUNT: 4.08 K/UL (ref 1.5–8.1)
SODIUM BLD-SCNC: 140 MMOL/L (ref 135–144)
WBC # BLD: 7.3 K/UL (ref 3.5–11.3)
WBC # BLD: ABNORMAL 10*3/UL

## 2021-09-21 PROCEDURE — 2580000003 HC RX 258: Performed by: PLASTIC SURGERY

## 2021-09-21 PROCEDURE — 97110 THERAPEUTIC EXERCISES: CPT

## 2021-09-21 PROCEDURE — 6370000000 HC RX 637 (ALT 250 FOR IP): Performed by: PLASTIC SURGERY

## 2021-09-21 PROCEDURE — 1200000000 HC SEMI PRIVATE

## 2021-09-21 PROCEDURE — 82947 ASSAY GLUCOSE BLOOD QUANT: CPT

## 2021-09-21 PROCEDURE — 6370000000 HC RX 637 (ALT 250 FOR IP)

## 2021-09-21 PROCEDURE — 97530 THERAPEUTIC ACTIVITIES: CPT

## 2021-09-21 PROCEDURE — 6370000000 HC RX 637 (ALT 250 FOR IP): Performed by: INTERNAL MEDICINE

## 2021-09-21 PROCEDURE — 85025 COMPLETE CBC W/AUTO DIFF WBC: CPT

## 2021-09-21 PROCEDURE — 6370000000 HC RX 637 (ALT 250 FOR IP): Performed by: STUDENT IN AN ORGANIZED HEALTH CARE EDUCATION/TRAINING PROGRAM

## 2021-09-21 PROCEDURE — 80048 BASIC METABOLIC PNL TOTAL CA: CPT

## 2021-09-21 PROCEDURE — 36415 COLL VENOUS BLD VENIPUNCTURE: CPT

## 2021-09-21 PROCEDURE — 6360000002 HC RX W HCPCS: Performed by: STUDENT IN AN ORGANIZED HEALTH CARE EDUCATION/TRAINING PROGRAM

## 2021-09-21 PROCEDURE — 94640 AIRWAY INHALATION TREATMENT: CPT

## 2021-09-21 PROCEDURE — G0108 DIAB MANAGE TRN  PER INDIV: HCPCS

## 2021-09-21 PROCEDURE — 97116 GAIT TRAINING THERAPY: CPT

## 2021-09-21 PROCEDURE — 99232 SBSQ HOSP IP/OBS MODERATE 35: CPT | Performed by: INTERNAL MEDICINE

## 2021-09-21 RX ORDER — BACLOFEN 10 MG/1
10 TABLET ORAL 3 TIMES DAILY
Status: DISCONTINUED | OUTPATIENT
Start: 2021-09-21 | End: 2021-09-24 | Stop reason: HOSPADM

## 2021-09-21 RX ADMIN — OXYCODONE HYDROCHLORIDE 5 MG: 5 TABLET ORAL at 05:06

## 2021-09-21 RX ADMIN — ACETAMINOPHEN 650 MG: 325 TABLET ORAL at 18:25

## 2021-09-21 RX ADMIN — PREGABALIN 75 MG: 75 CAPSULE ORAL at 09:21

## 2021-09-21 RX ADMIN — INSULIN LISPRO 2 UNITS: 100 INJECTION, SOLUTION INTRAVENOUS; SUBCUTANEOUS at 09:01

## 2021-09-21 RX ADMIN — VENLAFAXINE HYDROCHLORIDE 150 MG: 150 CAPSULE, EXTENDED RELEASE ORAL at 09:21

## 2021-09-21 RX ADMIN — AMOXICILLIN 500 MG: 500 CAPSULE ORAL at 15:40

## 2021-09-21 RX ADMIN — TRAZODONE HYDROCHLORIDE 150 MG: 100 TABLET ORAL at 21:52

## 2021-09-21 RX ADMIN — SODIUM CHLORIDE, PRESERVATIVE FREE 10 ML: 5 INJECTION INTRAVENOUS at 20:55

## 2021-09-21 RX ADMIN — SODIUM CHLORIDE, PRESERVATIVE FREE 10 ML: 5 INJECTION INTRAVENOUS at 09:21

## 2021-09-21 RX ADMIN — ENOXAPARIN SODIUM 40 MG: 40 INJECTION SUBCUTANEOUS at 09:21

## 2021-09-21 RX ADMIN — PREGABALIN 75 MG: 75 CAPSULE ORAL at 21:52

## 2021-09-21 RX ADMIN — AMOXICILLIN 500 MG: 500 CAPSULE ORAL at 20:53

## 2021-09-21 RX ADMIN — FLUCONAZOLE 400 MG: 200 TABLET ORAL at 09:21

## 2021-09-21 RX ADMIN — FENTANYL CITRATE 25 MCG: 50 INJECTION, SOLUTION INTRAMUSCULAR; INTRAVENOUS at 07:48

## 2021-09-21 RX ADMIN — OXYCODONE HYDROCHLORIDE 5 MG: 5 TABLET ORAL at 13:36

## 2021-09-21 RX ADMIN — INSULIN GLARGINE 20 UNITS: 100 INJECTION, SOLUTION SUBCUTANEOUS at 09:31

## 2021-09-21 RX ADMIN — FENTANYL CITRATE 25 MCG: 50 INJECTION, SOLUTION INTRAMUSCULAR; INTRAVENOUS at 21:06

## 2021-09-21 RX ADMIN — AMOXICILLIN 500 MG: 500 CAPSULE ORAL at 05:59

## 2021-09-21 RX ADMIN — INSULIN GLARGINE 20 UNITS: 100 INJECTION, SOLUTION SUBCUTANEOUS at 20:53

## 2021-09-21 RX ADMIN — OXYCODONE HYDROCHLORIDE 5 MG: 5 TABLET ORAL at 09:24

## 2021-09-21 RX ADMIN — PANTOPRAZOLE SODIUM 20 MG: 20 TABLET, DELAYED RELEASE ORAL at 15:40

## 2021-09-21 RX ADMIN — BUDESONIDE AND FORMOTEROL FUMARATE DIHYDRATE 2 PUFF: 80; 4.5 AEROSOL RESPIRATORY (INHALATION) at 07:51

## 2021-09-21 RX ADMIN — PANTOPRAZOLE SODIUM 20 MG: 20 TABLET, DELAYED RELEASE ORAL at 05:59

## 2021-09-21 RX ADMIN — OXYCODONE HYDROCHLORIDE 5 MG: 5 TABLET ORAL at 00:49

## 2021-09-21 RX ADMIN — BACLOFEN 10 MG: 10 TABLET ORAL at 20:53

## 2021-09-21 RX ADMIN — INSULIN LISPRO 4 UNITS: 100 INJECTION, SOLUTION INTRAVENOUS; SUBCUTANEOUS at 12:34

## 2021-09-21 RX ADMIN — INSULIN LISPRO 1 UNITS: 100 INJECTION, SOLUTION INTRAVENOUS; SUBCUTANEOUS at 20:54

## 2021-09-21 RX ADMIN — OXYCODONE HYDROCHLORIDE 5 MG: 5 TABLET ORAL at 18:25

## 2021-09-21 RX ADMIN — FENTANYL CITRATE 25 MCG: 50 INJECTION, SOLUTION INTRAMUSCULAR; INTRAVENOUS at 17:02

## 2021-09-21 ASSESSMENT — PAIN DESCRIPTION - PAIN TYPE
TYPE: SURGICAL PAIN

## 2021-09-21 ASSESSMENT — PAIN DESCRIPTION - DESCRIPTORS
DESCRIPTORS: ACHING;BURNING
DESCRIPTORS: ACHING;BURNING
DESCRIPTORS: ACHING;BURNING;CONSTANT
DESCRIPTORS: ACHING;BURNING

## 2021-09-21 ASSESSMENT — PAIN SCALES - GENERAL
PAINLEVEL_OUTOF10: 10
PAINLEVEL_OUTOF10: 0
PAINLEVEL_OUTOF10: 7
PAINLEVEL_OUTOF10: 10
PAINLEVEL_OUTOF10: 7
PAINLEVEL_OUTOF10: 0
PAINLEVEL_OUTOF10: 6
PAINLEVEL_OUTOF10: 9
PAINLEVEL_OUTOF10: 7
PAINLEVEL_OUTOF10: 10

## 2021-09-21 ASSESSMENT — PAIN DESCRIPTION - ONSET
ONSET: ON-GOING

## 2021-09-21 ASSESSMENT — PAIN DESCRIPTION - PROGRESSION
CLINICAL_PROGRESSION: NOT CHANGED

## 2021-09-21 ASSESSMENT — PAIN DESCRIPTION - FREQUENCY
FREQUENCY: CONTINUOUS

## 2021-09-21 ASSESSMENT — PAIN DESCRIPTION - DIRECTION
RADIATING_TOWARDS: HAND
RADIATING_TOWARDS: HAND

## 2021-09-21 ASSESSMENT — PAIN - FUNCTIONAL ASSESSMENT: PAIN_FUNCTIONAL_ASSESSMENT: PREVENTS OR INTERFERES SOME ACTIVE ACTIVITIES AND ADLS

## 2021-09-21 ASSESSMENT — PAIN DESCRIPTION - LOCATION
LOCATION: FINGER (COMMENT WHICH ONE)

## 2021-09-21 ASSESSMENT — PAIN DESCRIPTION - ORIENTATION
ORIENTATION: RIGHT

## 2021-09-21 NOTE — PROGRESS NOTES
Nutrition Assessment     Type and Reason for Visit: Initial, RD Nutrition Re-Screen/LOS    Nutrition Recommendations/Plan:   - Continue Regular Diet with 3 carbohydrate choices per meal  - Monitor/encourage PO intakes      Nutrition Assessment:  Patient seen for length of stay. Spoke with patient who reports that her appetite is \"fine,\" and that she has been eating at least half of meals. Denies significant weight change. Declines oral nutrition supplements at this time. Blood glucose range x past 24 hours = 127-212 mg/dL. Last BM noted on 9/15 - PRN bowel regimen in place. Malnutrition Assessment:  Malnutrition Status: At risk for malnutrition     Nutrition Related Findings: Labs reviewed. Meds: Lantus, Humalog. Last BM 9/15. Current Nutrition Therapies:    ADULT DIET; Regular; 3 carb choices (45 gm/meal)    Anthropometric Measures:  · Height: 5' 6\" (167.6 cm)  · Current Body Wt: 236 lb 15.9 oz (107.5 kg)   · BMI: 38.3    Nutrition Diagnosis:   No nutrition diagnosis at this time     Nutrition Interventions:   Food and/or Nutrient Delivery:  Continue Current Diet  Nutrition Education/Counseling:  Education not indicated, No recommendation at this time   Coordination of Nutrition Care:  Continue to monitor while inpatient    Goals:  PO intake to meet >75% of estimated nutrition needs       Nutrition Monitoring and Evaluation:   Behavioral-Environmental Outcomes:  None Identified   Food/Nutrient Intake Outcomes:  Food and Nutrient Intake  Physical Signs/Symptoms Outcomes:  Biochemical Data, Constipation, GI Status, Fluid Status or Edema, Nutrition Focused Physical Findings, Skin, Weight     Discharge Planning:     Too soon to determine     Electronically signed by Jonathan Guevara RD, LD on 9/21/21 at 2:50 PM EDT    Contact: [phone]

## 2021-09-21 NOTE — PROGRESS NOTES
Sarah Wesley,  Seen for evaluation and education on Type 2 uncontrolled    Lab Results   Component Value Date    LABA1C 14.2 (H) 09/16/2021     Lab Results   Component Value Date     09/16/2021     Discussed with Sarah Wesley, their current diabetes self-care routines prior to this admission. medication compliance: noncompliant some of the time, patient indicated she has taken both insulin via pen and oral pills in past. She has had care at [facility] in past. She expressed to writer that she has bi polor and that makes it hard for her to stay up with her diabetes medications. diabetic diet compliance: noncompliant some of the time, home glucose monitoring: is performed sporadically. While patient expressed that  \" this is not her 1st rodeo\" and that she understands she has diabetes, she did not want to engage with conversation on her diet and home monitoring of BG. Following Survival Skills education topics were covered as appropriate to patient plan of for care:  _x__ Blood sugar targets Pre meal 80 - 130 and 2 hours post meal < 180    _x__ Hyperglycemia  ( BG over 250) signs and symptoms and treatment      _x__ Hypoglycemia( BG < than 70) signs and symptoms and treatment \"Rule of 15\"    _x__ Medications -  Insulin Lantus - Basal ( long acting) / Humalog-  Bolus (pre meal)  regime - insulin action times.     ___ Importance of dosing  pre meal rapid insulin from BG result at time of start of  meal & administering  within 15 minutes of eating meals   _x__ Importance of taking long acting insulin at same time each day and not to skip doses - noted discharge plan is 20 units twice per day of insulin   _x__ Demonstration of self-administering insulin via Pen and pen needle tips   _x__ Reinforce safe needle / sharps disposal    _x__ Insulin injection site rotation    Following Support materials were provided for patient to take home:  _x__ Educational Booklets as available \"bd insulin teaching booklet\"  __x_ [facility] Diabetes Education brochure/ contact card    Patient verbalizes understanding  of survival skills education. Diabetes Medications:  _x__ Insulin Pen  Basal / long acting - dose per MD   _x__ Insulin Delivery method - Pens -   order Insulin Pen Needle 31G X 4 mm MISC      Diabetes Education / HCP follow -up : noted plan with discharge to [facility]   _x__ Follow -up with HCP / PCP within one week.     Neha Hi RN

## 2021-09-21 NOTE — PROGRESS NOTES
Physical Therapy  Facility/Department: [facility] CAR 2  Daily Treatment Note  NAME: Lisa Duenas  : 1965  MRN: [id number]    Date of Service: 2021    Discharge Recommendations:  Patient would benefit from continued therapy after discharge        Assessment     Body structures, Functions, Activity limitations: Decreased functional mobility ; Decreased endurance;Decreased balance;Decreased strength;Decreased posture;Decreased safe awareness     Assessment: Pt presents with + wound on dominant hand w/ inability to use due to excessive pain. Pt also w/ deficits of jean-pierre LE neuropathy so advanced that patient often needs to \"look\" at her feet to indicate where they are during exercises/ambuulation. Prognosis: Good  Decision Making: Medium Complexity  Clinical Presentation: evolving  PT Education: General Safety;Precautions; Disease Specific Education  REQUIRES PT FOLLOW UP: Yes  Activity Tolerance  Activity Tolerance: Patient limited by fatigue     Patient Diagnosis(es): The primary encounter diagnosis was Tenosynovitis of finger. Diagnoses of Felon of finger and Flexor tenosynovitis of finger were also pertinent to this visit.      has a past medical history of Acid reflux, Acute cystitis with hematuria, Acute non-recurrent maxillary sinusitis, Asthma, Bipolar 1 disorder ([address] Bipolar disorder, mixed ([address] BMI 34.0-34.9,adult, Cannabis use disorder, severe, dependence ([address] Cerebrovascular accident (CVA) ([address] Chest pain, Chronic renal insufficiency, Cocaine abuse ([address] COVID-19 virus RNA test result unknown, DDD (degenerative disc disease), cervical, Diabetes mellitus ([address] Dizziness, Fibromyalgia, History of stroke, Homicidal ideation, Hyperglycemia, Hypertension, Hypotension, IDDM (insulin dependent diabetes mellitus), Lupus ([address] Migraine, Neuropathy, Neuropathy, Polysubstance abuse ([address] Post traumatic stress disorder (PTSD), Posttraumatic stress disorder, Recurrent depression ([address] Recurrent major depressive disorder, in partial remission ([facility] Screening mammogram, encounter for, Severe recurrent major depression with psychotic features ([facility] Severe recurrent major depression without psychotic features ([facility] Stroke (cerebrum) ([facility] Stroke ([facility] [address] Suicidal ideation, Suicidal intent, Vitamin D deficiency, and White matter changes. has a past surgical history that includes Abscess Drainage; chest tube insertion; Abdomen surgery; Cataract removal with implant (Bilateral); LASIK (Bilateral); Finger amputation (Left, 03/13/2021); Hand surgery (Right, 09/14/2021); Finger surgery (Right, 09/15/2021); and Hand surgery (Right, 9/15/2021). Restrictions  Restrictions/Precautions  Restrictions/Precautions: Fall Risk, Up as Tolerated  Position Activity Restriction  Other position/activity restrictions: Left hand 4th digit PIP amputation, Left foot 4-5 digit amputation. Right hand 2nd digit tenosynovitis of finger s/p debridement with wound dressing applied. Subjective   Pt in bed watching TV. Pt c/o 7/10 R hand pain. Per pt meds were recently given to her. Orientation    Overall Orientation Status: Within Functional Limits  Objective     Bed mobility  Supine to Sit: Modified independent  Sit to Supine: Modified independent  Scooting: Modified independent  Transfers  Sit to Stand:  (pt unable to stand up from chair without \"rocking\" and multiple attempts as she is only able to push w/ one UE)  Stand to sit: Stand by assistance  Bed to Chair: Contact guard assistance  Ambulation  Ambulation?: Yes  More Ambulation?: No  Ambulation 1  Surface: level tile  Device: No Device  Assistance: Minimal assistance  Quality of Gait: gait with abducted stance for ability to maintain balance. Pt with one episode of loss of balance during gait. Gait Deviations: Increased STEPHY; Decreased step length;Decreased step height;Decreased head and trunk rotation; Shuffles  Distance: 45 ft x 1; 55 ft x 1;  15 ft x1; 25 ft x 1     Toilet transfer - SBA +1.  Balance  Posture:  (forward head and kyphotic / rounded shoulders)  Sitting - Static: Good  Sitting - Dynamic: Good  Standing - Static: Fair;+  Standing - Dynamic: Fair   Balance assessed with RW.   Ex's  Seated LE exercise program: [employer], heel/toe raises, and marches. Reps:20 x each with 2 lb wt on B LE's. AM-PAC Score  AM-PAC Inpatient Mobility without Stair Climbing Raw Score : 15 (09/21/21 1525)  AM-PAC Inpatient without Stair Climbing T-Scale Score : 43.03 (09/21/21 1525)  Mobility Inpatient CMS 0-100% Score: 47.43 (09/21/21 1525)  Mobility Inpatient without Stair CMS G-Code Modifier : CK (09/21/21 1525)       Goals  Short term goals  Time Frame for Short term goals: 12  Short term goal 1: Transfers independnet  Short term goal 2: Amb x 100 ft w/ cane independent  Short term goal 3: Pt issued HEP program and Ind w/ exercises  Short term goal 4: standing balance exercises SBA +1 w/ good safety awareness. Patient Goals   Patient goals : Pt goal is to get her hand healed and to improve strength    Plan    Plan  Times per week: 5x/week  Specific instructions for Next Treatment: balance - standing dynamic/ static. gait. HEP  Current Treatment Recommendations: Strengthening, Neuromuscular Re-education, Home Exercise Program, Cognitive/Perceptual Training, Safety Education & Training, Balance Training, Endurance Training, Patient/Caregiver Education & Training, Functional Mobility Training, Transfer Training, Gait Training, Positioning  Safety Devices  Type of devices:  All fall risk precautions in place, Call light within reach     Therapy Time   Individual Concurrent Group Co-treatment   Time In  240         Time Out  320         Minutes  [address]

## 2021-09-21 NOTE — PROGRESS NOTES
[facility]  Internal Medicine Teaching Residency Program  Inpatient Daily Progress Note  ______________________________________________________________________________    Patient: Sean Hatchet  YOB: 1965   O:[id number]    Acct: [de-identified]     Room: 2023/2023-01  Admit date: 9/14/2021  Today's date: 09/21/21  Number of days in the hospital: 7    SUBJECTIVE   Admitting Diagnosis: DKA (diabetic ketoacidoses) ([facility]  CC: right index finger pain  Pt examined at bedside. Chart & results reviewed. Pt examined at bedside. Chart & results reviewed. Patient hemodynamically stable, afebrile  Patient glucose controlled well, 159 in am.  Discussed with plastic, recommended betadine bid dressing change with po antibiotics and to follow up outpatient for potential single staged amputation if need. Appreciate the recommendations. Waiting for patient to pick a place for SNF.     ROS:  Constitutional:  negative for chills, fevers, sweats  Respiratory:  negative for cough, dyspnea on exertion, hemoptysis, shortness of breath, wheezing  Cardiovascular:  negative for chest pain, chest pressure/discomfort, lower extremity edema, palpitations  Gastrointestinal:  negative for abdominal pain, constipation, diarrhea, nausea, vomiting  Neurological:  negative for dizziness, headache  BRIEF HISTORY     The patient is a pleasant 56 y. o. female presents with a chief complaint of right index finger pain. The patient has PMH significant for DM-2, HTN, Bipolar disorder, and polysubstance abuse. The patient reported that she had a fall yesterday and injured her right index finger and since then has had swelling and pain. She denies hitting her head or loss of consciousness. She denies urine or fecal incontinence. She denies neck pain, chest pain, shortness of breath, abdominal pain. The patient was tearful during the interview and was unable to provide a detailed history.  She denies intention of self-harm or suicidal ideation. Plastic surgery was consulted and patient was taken to OR for exploration and drainage of right index finger abscess.      In the ED, the patient was afebrile Temp 98.5F, RR 18, HR 98, /123, SpO2 95%. Labs reviewed: Na 126, K 4.9, bicarb 16, BUN 11, Cr 1.08, Glucose 719, beta-hydroxy 4.42, WBC 9.4, Hgb 10.7, Plt 59, ESR 69, Blood gas: pH 7.283/ pCO2 53.7/ pO2 20.1/ HCO3 24.6Covid negative, wound culture shows gram negative rods. The patient received 10 units of insulin in the ED and was started on DKA protocol with insulin gtt and NS IVF. She was started on Vanc and Zosyn.     OBJECTIVE     Vital Signs:  /87   Pulse 76   Temp 97.5 °F (36.4 °C) (Oral)   Resp 17   Ht 5' 6\" (1.676 m)   Wt 236 lb 15.9 oz (107.5 kg)   LMP  (LMP Unknown)   SpO2 97%   BMI 38.25 kg/m²     Temp (24hrs), Av.9 °F (36.6 °C), Min:97.5 °F (36.4 °C), Max:98.1 °F (36.7 °C)    In: -   Out: 1450 [Urine:1450]    Physical Exam:  Constitutional: This is a well developed, well nourished, 35-39.9 - Obesity Grade II 49y.o. year old female who is alert, oriented, cooperative and in no apparent distress. Head:normocephalic and atraumatic. EENT:  PERRLA. No conjunctival injections. Septum was midline, mucosa was without erythema, exudates or cobblestoning. No thrush was noted. Neck: Supple without thyromegaly. No elevated JVP. Trachea was midline. Respiratory: Chest was symmetrical without dullness to percussion. Breath sounds bilaterally were clear to auscultation. There were no wheezes, rhonchi or rales. There is no intercostal retraction or use of accessory muscles. No egophony noted. Cardiovascular: Regular without murmur, clicks, gallops or rubs. Abdomen: Slightly rounded and soft without organomegaly. No rebound, rigidity or guarding was appreciated. Lymphatic: No lymphadenopathy. Musculoskeletal: Normal curvature of the spine. No gross muscle weakness. Extremities:  No lower extremity edema, ulcerations, tenderness, varicosities or erythema. Muscle size, tone and strength are normal.  No involuntary movements are noted. Skin:  Warm and dry. Good color, turgor and pigmentation. No lesions or scars.   No cyanosis or clubbing  Neurological/Psychiatric: The patient's general behavior, level of consciousness, thought content and emotional status is normal.        Medications:  Scheduled Medications:    fluconazole  400 mg Oral Daily    insulin glargine  20 Units SubCUTAneous BID    enoxaparin  40 mg SubCUTAneous Daily    amoxicillin  500 mg Oral 3 times per day    insulin lispro  0-12 Units SubCUTAneous TID WC    insulin lispro  0-6 Units SubCUTAneous Nightly    sodium chloride  1,000 mL IntraVENous Once    budesonide-formoterol  2 puff Inhalation BID    pantoprazole  20 mg Oral BID AC    traZODone  150 mg Oral Nightly    pregabalin  75 mg Oral BID    venlafaxine  150 mg Oral Daily with breakfast    sodium chloride flush  5-40 mL IntraVENous 2 times per day     Continuous Infusions:    dextrose      sodium chloride       PRN MedicationsfentanNYL, 25 mcg, PRN  glucose, 15 g, PRN  dextrose, 12.5 g, PRN  glucagon (rDNA), 1 mg, PRN  dextrose, 100 mL/hr, PRN  albuterol sulfate HFA, 2 puff, Q4H PRN  sodium chloride flush, 5-40 mL, PRN  sodium chloride, 25 mL, PRN  ondansetron, 4 mg, Q8H PRN   Or  ondansetron, 4 mg, Q6H PRN  polyethylene glycol, 17 g, Daily PRN  acetaminophen, 650 mg, Q6H PRN   Or  acetaminophen, 650 mg, Q6H PRN  dextrose, 12.5 g, PRN  oxyCODONE, 5 mg, Q4H PRN        Diagnostic Labs:  CBC:   Recent Labs     09/19/21  0747 09/20/21  1029   WBC 10.2 9.1   RBC 2.73* 2.49*   HGB 8.7* 7.9*   HCT 28.7* 27.7*   .1* 111.2*   RDW 13.9 14.2    403     BMP:   Recent Labs     09/19/21  0747 09/20/21  1029    141   K 4.2 4.5    106   CO2 25 24   BUN 7 9   CREATININE 0.74 0.79     BNP: No results for input(s): BNP in the last 72 hours. PT/INR: No results for input(s): PROTIME, INR in the last 72 hours. APTT: No results for input(s): APTT in the last 72 hours. CARDIAC ENZYMES: No results for input(s): CKMB, CKMBINDEX, TROPONINI in the last 72 hours. Invalid input(s): CKTOTAL;3  FASTING LIPID PANEL:  Lab Results   Component Value Date    CHOL 275 (H) 12/30/2020    HDL 80 12/30/2020    TRIG 246 (H) 12/30/2020     LIVER PROFILE:   Recent Labs     09/20/21  1029   AST 12   ALT 5   BILIDIR <0.08   BILITOT 0.18*   ALKPHOS 103      MICROBIOLOGY:   Lab Results   Component Value Date/Time    CULTURE NO GROWTH 6 DAYS 09/14/2021 03:06 PM       Imaging:    CT HEAD WO CONTRAST    Result Date: 9/15/2021  1. No acute intracranial abnormality. 2.  White matter hypoattenuation described is typical of microvascular ischemic disease or as sequela of dysmyelinating/demyelinating processes. 3.  Vascular calcifications are noted reflecting calcific atherosclerosis. US RENAL COMPLETE    Result Date: 9/17/2021  Unremarkable ultrasound of the kidneys and urinary bladder. Cholelithiasis. XR CHEST PORTABLE    Result Date: 9/15/2021  Remote granulomatous disease No acute cardiopulmonary findings       ASSESSMENT & PLAN     ASSESSMENT / PLAN:     Principal Problem:    DKA (diabetic ketoacidoses) ([address]  Active Problems:    Bipolar disorder, mixed ([address]    Polysubstance abuse ([facility]    Tenosynovitis of finger  Resolved Problems:    * No resolved hospital problems.  *        Tenosynovitis Right index finger  - s/p drainage of abscess per plastics surgery: - Wound culture: gram positive rods, yeast- not candida albicans or candida dubliniensis light growth  - Received 1 dose of Vancomycin and TDAP vaccine shot  - Wound and Abscess culture: many gram positive rods and yeast light growth  - Plastic surgery following: flexor tendon distal to PIP debridement yesterday.  DIP not able to flex the fingers survives.  2 options were discussed with the patient to salvage the finger that will have limited motion.  Patient shows 2 to 3 times daily dressing changes instead of amputation.  Per plastics patient can be discharged when ID feels infection is under control and follow-up with [facility]  - ID following: recommended fluconazole 400 mg high dose for 21 days for tendinitis R hand and soft tissue swelling: to f/u LFT 2 x per week. - Discussed with plastic, recommended betadine bid dressing change with po antibiotics and to follow up outpatient for potential single staged amputation if need. Appreciate the recommendations.       DKA- resolved; bridged to Lantus  - Glucose 719, beta-hydroxy 4.42, on admission   - blood gas: pH 7.283/ pCO2 53.7/ pO2 20.1/ HCO3 24.6  - bridged to lantus 20 U bid with medium dose ss. - HbA1c 14. 2- non compliant with insulin at home  - Blood sugar: 159<--188<--200<--205; on Lantus 20 units BID; medium dose sliding scale and lantus 20 U bid  - Hypoglycemia protocol  - Diabetic diet- patient tolerating diet well.       Polysubstance abuse  - Urine tox: positive for benzodiazepine, cocaine and opiates  - Avoid beta blockers  - Monitor for withdrawal symptoms  - Monitor BP closely  - erratic behavior overnight, patient wanted to leave AMA, refused lab [id number] 09/15  - Psychiatry consulted: low risk of suicide and harm to others, no indicate for sitter admission to psychiatry. Recommend outpatient follow-up       HTN- controlled  - Monitor BP; avoid beta-blockers due to drug abuse history        Thrombocytopenia- Plt 403- resolved  - secondary to infection vs. Drug abuse     MARTIN- Cr 0.79<--1.23 (0.8~0.9) - resolved  - Discontinued IVF  - US Renal: unremarkable kidneys and urinary bladder     DVT ppx : lovenox  GI ppx: protonix    PT/OT: pt/ot on board  Discharge Planning / SW: waiting for patient to choose a facility,  on board.      Laura Fairbanks MD  Internal Medicine Resident, PGY-1  [address] [facility]; [place], [place]  9/21/2021, 8:52 AM

## 2021-09-22 LAB
ABSOLUTE EOS #: 0.16 K/UL (ref 0–0.44)
ABSOLUTE IMMATURE GRANULOCYTE: 0.04 K/UL (ref 0–0.3)
ABSOLUTE LYMPH #: 3.42 K/UL (ref 1.1–3.7)
ABSOLUTE MONO #: 0.99 K/UL (ref 0.1–1.2)
ALBUMIN SERPL-MCNC: 2.9 G/DL (ref 3.5–5.2)
ALBUMIN/GLOBULIN RATIO: 0.6 (ref 1–2.5)
ALP BLD-CCNC: 100 U/L (ref 35–104)
ALT SERPL-CCNC: 6 U/L (ref 5–33)
ANION GAP SERPL CALCULATED.3IONS-SCNC: 14 MMOL/L (ref 9–17)
AST SERPL-CCNC: 21 U/L
BASOPHILS # BLD: 1 % (ref 0–2)
BASOPHILS ABSOLUTE: 0.05 K/UL (ref 0–0.2)
BILIRUB SERPL-MCNC: 0.16 MG/DL (ref 0.3–1.2)
BILIRUBIN DIRECT: <0.08 MG/DL
BILIRUBIN, INDIRECT: ABNORMAL MG/DL (ref 0–1)
BUN BLDV-MCNC: 6 MG/DL (ref 6–20)
BUN/CREAT BLD: NORMAL (ref 9–20)
CALCIUM SERPL-MCNC: 8.8 MG/DL (ref 8.6–10.4)
CHLORIDE BLD-SCNC: 105 MMOL/L (ref 98–107)
CO2: 24 MMOL/L (ref 20–31)
CREAT SERPL-MCNC: 0.79 MG/DL (ref 0.5–0.9)
DIFFERENTIAL TYPE: ABNORMAL
EOSINOPHILS RELATIVE PERCENT: 2 % (ref 1–4)
GFR AFRICAN AMERICAN: >60 ML/MIN
GFR NON-AFRICAN AMERICAN: >60 ML/MIN
GFR SERPL CREATININE-BSD FRML MDRD: NORMAL ML/MIN/{1.73_M2}
GFR SERPL CREATININE-BSD FRML MDRD: NORMAL ML/MIN/{1.73_M2}
GLOBULIN: ABNORMAL G/DL (ref 1.5–3.8)
GLUCOSE BLD-MCNC: 115 MG/DL (ref 65–105)
GLUCOSE BLD-MCNC: 220 MG/DL (ref 65–105)
GLUCOSE BLD-MCNC: 246 MG/DL (ref 65–105)
GLUCOSE BLD-MCNC: 83 MG/DL (ref 70–99)
GLUCOSE BLD-MCNC: 84 MG/DL (ref 65–105)
HCT VFR BLD CALC: 30.6 % (ref 36.3–47.1)
HEMOGLOBIN: 8.7 G/DL (ref 11.9–15.1)
IMMATURE GRANULOCYTES: 1 %
LYMPHOCYTES # BLD: 42 % (ref 24–43)
MCH RBC QN AUTO: 31 PG (ref 25.2–33.5)
MCHC RBC AUTO-ENTMCNC: 28.4 G/DL (ref 28.4–34.8)
MCV RBC AUTO: 108.9 FL (ref 82.6–102.9)
MONOCYTES # BLD: 12 % (ref 3–12)
NRBC AUTOMATED: 0.5 PER 100 WBC
PDW BLD-RTO: 14 % (ref 11.8–14.4)
PLATELET # BLD: 478 K/UL (ref 138–453)
PLATELET ESTIMATE: ABNORMAL
PMV BLD AUTO: 9.9 FL (ref 8.1–13.5)
POTASSIUM SERPL-SCNC: 4.1 MMOL/L (ref 3.7–5.3)
RBC # BLD: 2.81 M/UL (ref 3.95–5.11)
RBC # BLD: ABNORMAL 10*6/UL
SEG NEUTROPHILS: 42 % (ref 36–65)
SEGMENTED NEUTROPHILS ABSOLUTE COUNT: 3.31 K/UL (ref 1.5–8.1)
SODIUM BLD-SCNC: 143 MMOL/L (ref 135–144)
TOTAL PROTEIN: 7.5 G/DL (ref 6.4–8.3)
WBC # BLD: 8 K/UL (ref 3.5–11.3)
WBC # BLD: ABNORMAL 10*3/UL

## 2021-09-22 PROCEDURE — 94760 N-INVAS EAR/PLS OXIMETRY 1: CPT

## 2021-09-22 PROCEDURE — 97535 SELF CARE MNGMENT TRAINING: CPT

## 2021-09-22 PROCEDURE — 6370000000 HC RX 637 (ALT 250 FOR IP): Performed by: PLASTIC SURGERY

## 2021-09-22 PROCEDURE — 80048 BASIC METABOLIC PNL TOTAL CA: CPT

## 2021-09-22 PROCEDURE — 2580000003 HC RX 258: Performed by: PLASTIC SURGERY

## 2021-09-22 PROCEDURE — 6370000000 HC RX 637 (ALT 250 FOR IP): Performed by: STUDENT IN AN ORGANIZED HEALTH CARE EDUCATION/TRAINING PROGRAM

## 2021-09-22 PROCEDURE — 6370000000 HC RX 637 (ALT 250 FOR IP): Performed by: INTERNAL MEDICINE

## 2021-09-22 PROCEDURE — 99232 SBSQ HOSP IP/OBS MODERATE 35: CPT | Performed by: INTERNAL MEDICINE

## 2021-09-22 PROCEDURE — 1200000000 HC SEMI PRIVATE

## 2021-09-22 PROCEDURE — 6360000002 HC RX W HCPCS: Performed by: STUDENT IN AN ORGANIZED HEALTH CARE EDUCATION/TRAINING PROGRAM

## 2021-09-22 PROCEDURE — 85025 COMPLETE CBC W/AUTO DIFF WBC: CPT

## 2021-09-22 PROCEDURE — 80076 HEPATIC FUNCTION PANEL: CPT

## 2021-09-22 PROCEDURE — 6370000000 HC RX 637 (ALT 250 FOR IP)

## 2021-09-22 PROCEDURE — 94640 AIRWAY INHALATION TREATMENT: CPT

## 2021-09-22 PROCEDURE — 82947 ASSAY GLUCOSE BLOOD QUANT: CPT

## 2021-09-22 PROCEDURE — 36415 COLL VENOUS BLD VENIPUNCTURE: CPT

## 2021-09-22 RX ORDER — PREGABALIN 100 MG/1
100 CAPSULE ORAL 2 TIMES DAILY
Status: DISCONTINUED | OUTPATIENT
Start: 2021-09-22 | End: 2021-09-24 | Stop reason: HOSPADM

## 2021-09-22 RX ADMIN — INSULIN GLARGINE 20 UNITS: 100 INJECTION, SOLUTION SUBCUTANEOUS at 21:44

## 2021-09-22 RX ADMIN — ACETAMINOPHEN 650 MG: 325 TABLET ORAL at 20:19

## 2021-09-22 RX ADMIN — BUDESONIDE AND FORMOTEROL FUMARATE DIHYDRATE 2 PUFF: 80; 4.5 AEROSOL RESPIRATORY (INHALATION) at 20:32

## 2021-09-22 RX ADMIN — INSULIN LISPRO 4 UNITS: 100 INJECTION, SOLUTION INTRAVENOUS; SUBCUTANEOUS at 16:50

## 2021-09-22 RX ADMIN — FENTANYL CITRATE 25 MCG: 50 INJECTION, SOLUTION INTRAMUSCULAR; INTRAVENOUS at 10:53

## 2021-09-22 RX ADMIN — FLUCONAZOLE 400 MG: 200 TABLET ORAL at 09:34

## 2021-09-22 RX ADMIN — AMOXICILLIN 500 MG: 500 CAPSULE ORAL at 20:12

## 2021-09-22 RX ADMIN — AMOXICILLIN 500 MG: 500 CAPSULE ORAL at 14:08

## 2021-09-22 RX ADMIN — OXYCODONE HYDROCHLORIDE 5 MG: 5 TABLET ORAL at 06:02

## 2021-09-22 RX ADMIN — ACETAMINOPHEN 650 MG: 325 TABLET ORAL at 06:02

## 2021-09-22 RX ADMIN — PANTOPRAZOLE SODIUM 20 MG: 20 TABLET, DELAYED RELEASE ORAL at 17:17

## 2021-09-22 RX ADMIN — ACETAMINOPHEN 650 MG: 325 TABLET ORAL at 00:46

## 2021-09-22 RX ADMIN — OXYCODONE HYDROCHLORIDE 5 MG: 5 TABLET ORAL at 20:20

## 2021-09-22 RX ADMIN — PREGABALIN 100 MG: 100 CAPSULE ORAL at 21:40

## 2021-09-22 RX ADMIN — OXYCODONE HYDROCHLORIDE 5 MG: 5 TABLET ORAL at 00:46

## 2021-09-22 RX ADMIN — BACLOFEN 10 MG: 10 TABLET ORAL at 14:08

## 2021-09-22 RX ADMIN — OXYCODONE HYDROCHLORIDE 5 MG: 5 TABLET ORAL at 12:04

## 2021-09-22 RX ADMIN — SODIUM CHLORIDE, PRESERVATIVE FREE 10 ML: 5 INJECTION INTRAVENOUS at 09:37

## 2021-09-22 RX ADMIN — OXYCODONE HYDROCHLORIDE 5 MG: 5 TABLET ORAL at 15:50

## 2021-09-22 RX ADMIN — AMOXICILLIN 500 MG: 500 CAPSULE ORAL at 09:33

## 2021-09-22 RX ADMIN — TRAZODONE HYDROCHLORIDE 150 MG: 100 TABLET ORAL at 23:13

## 2021-09-22 RX ADMIN — PANTOPRAZOLE SODIUM 20 MG: 20 TABLET, DELAYED RELEASE ORAL at 09:35

## 2021-09-22 RX ADMIN — BACLOFEN 10 MG: 10 TABLET ORAL at 20:12

## 2021-09-22 RX ADMIN — ACETAMINOPHEN 650 MG: 325 TABLET ORAL at 15:50

## 2021-09-22 RX ADMIN — INSULIN LISPRO 2 UNITS: 100 INJECTION, SOLUTION INTRAVENOUS; SUBCUTANEOUS at 11:56

## 2021-09-22 RX ADMIN — BACLOFEN 10 MG: 10 TABLET ORAL at 09:35

## 2021-09-22 RX ADMIN — VENLAFAXINE HYDROCHLORIDE 150 MG: 150 CAPSULE, EXTENDED RELEASE ORAL at 09:34

## 2021-09-22 RX ADMIN — FENTANYL CITRATE 25 MCG: 50 INJECTION, SOLUTION INTRAMUSCULAR; INTRAVENOUS at 23:12

## 2021-09-22 RX ADMIN — SODIUM CHLORIDE, PRESERVATIVE FREE 10 ML: 5 INJECTION INTRAVENOUS at 20:16

## 2021-09-22 RX ADMIN — PREGABALIN 75 MG: 75 CAPSULE ORAL at 09:34

## 2021-09-22 RX ADMIN — INSULIN GLARGINE 20 UNITS: 100 INJECTION, SOLUTION SUBCUTANEOUS at 09:36

## 2021-09-22 ASSESSMENT — ENCOUNTER SYMPTOMS
ABDOMINAL DISTENTION: 0
EYE REDNESS: 0
PHOTOPHOBIA: 0
EYE PAIN: 0
COLOR CHANGE: 1
APNEA: 0
COUGH: 0
SHORTNESS OF BREATH: 0

## 2021-09-22 ASSESSMENT — PAIN DESCRIPTION - DESCRIPTORS
DESCRIPTORS: PATIENT UNABLE TO DESCRIBE
DESCRIPTORS: ACHING;DISCOMFORT;SORE;THROBBING
DESCRIPTORS: PATIENT UNABLE TO DESCRIBE

## 2021-09-22 ASSESSMENT — PAIN DESCRIPTION - PROGRESSION
CLINICAL_PROGRESSION: NOT CHANGED
CLINICAL_PROGRESSION: GRADUALLY WORSENING
CLINICAL_PROGRESSION: NOT CHANGED

## 2021-09-22 ASSESSMENT — PAIN DESCRIPTION - LOCATION
LOCATION: FINGER (COMMENT WHICH ONE);HAND
LOCATION: HAND
LOCATION: HAND

## 2021-09-22 ASSESSMENT — PAIN SCALES - GENERAL
PAINLEVEL_OUTOF10: 0
PAINLEVEL_OUTOF10: 9
PAINLEVEL_OUTOF10: 10
PAINLEVEL_OUTOF10: 7
PAINLEVEL_OUTOF10: 6
PAINLEVEL_OUTOF10: 9
PAINLEVEL_OUTOF10: 10
PAINLEVEL_OUTOF10: 7
PAINLEVEL_OUTOF10: 10
PAINLEVEL_OUTOF10: 4
PAINLEVEL_OUTOF10: 0

## 2021-09-22 ASSESSMENT — PAIN DESCRIPTION - FREQUENCY
FREQUENCY: CONTINUOUS

## 2021-09-22 ASSESSMENT — PAIN DESCRIPTION - PAIN TYPE
TYPE: ACUTE PAIN;SURGICAL PAIN
TYPE: ACUTE PAIN;SURGICAL PAIN

## 2021-09-22 ASSESSMENT — PAIN - FUNCTIONAL ASSESSMENT
PAIN_FUNCTIONAL_ASSESSMENT: ACTIVITIES ARE NOT PREVENTED
PAIN_FUNCTIONAL_ASSESSMENT: ACTIVITIES ARE NOT PREVENTED

## 2021-09-22 ASSESSMENT — PAIN DESCRIPTION - ONSET
ONSET: ON-GOING
ONSET: ON-GOING

## 2021-09-22 ASSESSMENT — PAIN DESCRIPTION - ORIENTATION
ORIENTATION: RIGHT

## 2021-09-22 NOTE — PLAN OF CARE
Problem: Pain:  Goal: Pain level will decrease  Description: Pain level will decrease  9/21/2021 2125 by Santosh Benton RN  Outcome: Ongoing  9/21/2021 1319 by Linda Conway RN  Outcome: Ongoing  Goal: Control of acute pain  Description: Control of acute pain  9/21/2021 2125 by Santosh Benton RN  Outcome: Ongoing  9/21/2021 1319 by Linda Conway RN  Outcome: Ongoing  Goal: Control of chronic pain  Description: Control of chronic pain  9/21/2021 2125 by Santosh Benton RN  Outcome: Ongoing  9/21/2021 1319 by Linda Conway RN  Outcome: Ongoing

## 2021-09-22 NOTE — PROGRESS NOTES
Pharmacist Note for Home Medication Reconciliation     Pharmacist reviewed home medications with patient and home med list was updated, some discrepancies was discovered please note the following:    Medications added: none      Medications not taking per patient:  Flovent (last filled 3/2021) - states she takes Symbicort       Medication changes in doses or instructions:   Pregabalin 100mg BID  Trazodone 200mg nightly      Recommendations: Patient states pregabalin is supposed to be 100mg three times a day, however last filled prescription was for 100mg twice a day. Patient states trazodone is supposed to be 200mg nightly, however last filled prescription was for 150mg nightly.      Thank you,   Elridge Hamman, PharmD 9/22/2021 11:00 AM

## 2021-09-22 NOTE — CARE COORDINATION
Received message from Yessy at [facility]. They are reviewing for acceptance    1030 met with pt to discuss transitional planning. Notified her of SNFs that accept pts with drug abuse history. She will review for freedom of choice    1250 spoke to Ana Felix at [facility]. Administration is still reviewing d/t positive tox screen    1615 met with pt. She is agreeable to [facility] [place] [address], and [employer] Referrals sent. Referral also sent to [facility] per pt's request. Her dressing changes are currently BID and she thinks they will be changed to QD    1727 received call from Sriram at [facility]. They are OON. [facility] [address] which are listed on [employer].  Pt agreeable

## 2021-09-22 NOTE — PROGRESS NOTES
consciousness. She denies urine or fecal incontinence. She denies neck pain, chest pain, shortness of breath, abdominal pain. The patient was tearful during the interview and was unable to provide a detailed history. She denies intention of self-harm or suicidal ideation. Plastic surgery was consulted and patient was taken to OR for exploration and drainage of right index finger abscess.      In the ED, the patient was afebrile Temp 98.5F, RR 18, HR 98, /123, SpO2 95%. Labs reviewed: Na 126, K 4.9, bicarb 16, BUN 11, Cr 1.08, Glucose 719, beta-hydroxy 4.42, WBC 9.4, Hgb 10.7, Plt 59, ESR 69, Blood gas: pH 7.283/ pCO2 53.7/ pO2 20.1/ HCO3 24.6Covid negative, wound culture shows gram negative rods. The patient received 10 units of insulin in the ED and was started on DKA protocol with insulin gtt and NS IVF. She was started on Vanc and Zosyn. OBJECTIVE     Vital Signs:  BP (!) 131/91   Pulse 86   Temp 97.9 °F (36.6 °C) (Axillary)   Resp 20   Ht 5' 6\" (1.676 m)   Wt 236 lb 12.8 oz (107.4 kg)   LMP  (LMP Unknown)   SpO2 94%   BMI 38.22 kg/m²     Temp (24hrs), Av.8 °F (36.6 °C), Min:97.7 °F (36.5 °C), Max:98 °F (36.7 °C)    In: 550   Out: 1050 [Urine:1050]    Physical Exam:  Constitutional: This is a well developed, well nourished, 35-39.9 - Obesity Grade II 49y.o. year old female who is alert, oriented, cooperative and in no apparent distress. Head:normocephalic and atraumatic. EENT:  PERRLA. No conjunctival injections. Septum was midline, mucosa was without erythema, exudates or cobblestoning. No thrush was noted. Neck: Supple without thyromegaly. No elevated JVP. Trachea was midline. Respiratory: Chest was symmetrical without dullness to percussion. Breath sounds bilaterally were clear to auscultation. There were no wheezes, rhonchi or rales. There is no intercostal retraction or use of accessory muscles. No egophony noted.    Cardiovascular: Regular without murmur, clicks, gallops or rubs.   Abdomen: Slightly rounded and soft without organomegaly. No rebound, rigidity or guarding was appreciated. Lymphatic: No lymphadenopathy. Musculoskeletal:  No gross muscle weakness. Extremities:  No lower extremity edema, ulcerations, tenderness, varicosities or erythema. Muscle size, tone and strength are normal.  No involuntary movements are noted. Skin:  Warm and dry. Good color, turgor and pigmentation. No lesions or scars.   No cyanosis or clubbing  Neurological/Psychiatric: The patient's general behavior, level of consciousness, thought content and emotional status is normal.        Medications:  Scheduled Medications:    baclofen  10 mg Oral TID    fluconazole  400 mg Oral Daily    insulin glargine  20 Units SubCUTAneous BID    enoxaparin  40 mg SubCUTAneous Daily    amoxicillin  500 mg Oral 3 times per day    insulin lispro  0-12 Units SubCUTAneous TID WC    insulin lispro  0-6 Units SubCUTAneous Nightly    sodium chloride  1,000 mL IntraVENous Once    budesonide-formoterol  2 puff Inhalation BID    pantoprazole  20 mg Oral BID AC    traZODone  150 mg Oral Nightly    pregabalin  75 mg Oral BID    venlafaxine  150 mg Oral Daily with breakfast    sodium chloride flush  5-40 mL IntraVENous 2 times per day     Continuous Infusions:    dextrose      sodium chloride       PRN MedicationsfentanNYL, 25 mcg, PRN  glucose, 15 g, PRN  dextrose, 12.5 g, PRN  glucagon (rDNA), 1 mg, PRN  dextrose, 100 mL/hr, PRN  albuterol sulfate HFA, 2 puff, Q4H PRN  sodium chloride flush, 5-40 mL, PRN  sodium chloride, 25 mL, PRN  ondansetron, 4 mg, Q8H PRN   Or  ondansetron, 4 mg, Q6H PRN  polyethylene glycol, 17 g, Daily PRN  acetaminophen, 650 mg, Q6H PRN   Or  acetaminophen, 650 mg, Q6H PRN  dextrose, 12.5 g, PRN  oxyCODONE, 5 mg, Q4H PRN        Diagnostic Labs:  CBC:   Recent Labs     09/20/21  1029 09/21/21  1344 09/22/21  0630   WBC 9.1 7.3 8.0   RBC 2.49* 2.56* 2.81*   HGB 7.9* 8.1* 8.7*   HCT 27.7* 27.2* 30.6*   .2* 106.3* 108.9*   RDW 14.2 14.2 14.0    432 478*     BMP:   Recent Labs     09/20/21  1029 09/21/21  1344 09/22/21  0630    140 143   K 4.5 4.1 4.1    104 105   CO2 24 25 24   BUN 9 8 6   CREATININE 0.79 0.88 0.79     BNP: No results for input(s): BNP in the last 72 hours. PT/INR: No results for input(s): PROTIME, INR in the last 72 hours. APTT: No results for input(s): APTT in the last 72 hours. CARDIAC ENZYMES: No results for input(s): CKMB, CKMBINDEX, TROPONINI in the last 72 hours. Invalid input(s): CKTOTAL;3  FASTING LIPID PANEL:  Lab Results   Component Value Date    CHOL 275 (H) 12/30/2020    HDL 80 12/30/2020    TRIG 246 (H) 12/30/2020     LIVER PROFILE:   Recent Labs     09/20/21  1029   AST 12   ALT 5   BILIDIR <0.08   BILITOT 0.18*   ALKPHOS 103      MICROBIOLOGY:   Lab Results   Component Value Date/Time    CULTURE NO GROWTH 6 DAYS 09/14/2021 03:06 PM       Imaging:    CT HEAD WO CONTRAST    Result Date: 9/15/2021  1. No acute intracranial abnormality. 2.  White matter hypoattenuation described is typical of microvascular ischemic disease or as sequela of dysmyelinating/demyelinating processes. 3.  Vascular calcifications are noted reflecting calcific atherosclerosis. US RENAL COMPLETE    Result Date: 9/17/2021  Unremarkable ultrasound of the kidneys and urinary bladder. Cholelithiasis. ASSESSMENT & PLAN     ASSESSMENT / PLAN:     Principal Problem:    DKA (diabetic ketoacidoses) ([facility])  Active Problems:    Tenosynovitis of finger    Bipolar disorder, mixed (HCC)    Polysubstance abuse ([address]  Resolved Problems:    * No resolved hospital problems.  *    Tenosynovitis Right index finger  - s/p drainage of abscess per plastics surgery: - Wound culture: gram positive rods, yeast- not candida albicans or candida dubliniensis light growth  - Received 1 dose of Vancomycin and TDAP vaccine shot  - Wound and Abscess culture: many gram positive rods and yeast light growth  - Plastic surgery following: flexor tendon distal to PIP debridement yesterday.  DIP not able to flex the fingers survives.  2 options were discussed with the patient to salvage the finger that will have limited motion.  Patient shows 2 to 3 times daily dressing changes instead of amputation.  Per plastics patient can be discharged when ID feels infection is under control and follow-up with [facility].  - Plastics: Discussed on 09/21: Recommended betadine BID dressing changes with PO antibiotics and follow-up outpatient for potential single staged amputation if needed  - ID following: - ID following: Currently on Amoxicillin 500 mg TID and Fluconazole 400 mg daily for 21 days (started 09/20) per ID. Follow-up LFT twice a week; follow-up in office in 3 weeks.            DKA- resolved; bridged to Lantus  - Glucose 719, beta-hydroxy 4.42, on admission   - blood gas: pH 7.283/ pCO2 53.7/ pO2 20.1/ HCO3 24.6  - bridged to lantus 20 U bid with medium dose ss. - HbA1c 14. 2- non compliant with insulin at home  - Blood sugar: 84<--83<--160<--187; on Lantus 20 units BID; medium dose sliding scale  - Hypoglycemia protocol  - Diabetic diet- patient tolerating diet well.         Polysubstance abuse  - Urine tox: positive for benzodiazepine, cocaine and opiates  - Avoid beta blockers  - Monitor for withdrawal symptoms  - Monitor BP closely  - erratic behavior overnight, patient wanted to leave AMA, refused lab draws on 09/15  - Psychiatry was consulted: low risk of suicide and harm to others, no indicate for sitter admission to psychiatry.  Recommend outpatient follow-up         HTN- controlled  - Monitor BP; avoid beta-blockers due to drug abuse history        Thrombocytopenia- GYS 652- resolved  - likely secondary to infection vs. Drug abuse       MARTIN- Cr 0.79<--0.88 (0.8~0.9) - resolved  - Discontinued IVF  - US Renal: unremarkable kidneys and urinary bladder         DVT ppx : Lovenox  GI ppx: Protonix    PT/OT: On board  Discharge Planning / SW: Awaiting placement- [facility]- reviewing for acceptance      Christi Jones MD  Internal Medicine Resident, PGY-1  [address], [place]  9/22/2021, 8:05 AM

## 2021-09-22 NOTE — PLAN OF CARE
Problem: Pain:  Goal: Pain level will decrease  Description: Pain level will decrease  9/21/2021 2125 by Suma Kamara RN  Outcome: Ongoing  Goal: Control of acute pain  Description: Control of acute pain  9/21/2021 2125 by Suma Kamara RN  Outcome: Ongoing  Goal: Control of chronic pain  Description: Control of chronic pain  9/21/2021 2125 by Suma Kamara RN  Outcome: Ongoing

## 2021-09-22 NOTE — PROGRESS NOTES
Occupational Therapy  Facility/Department: [facility] 2  Daily Treatment Note  NAME: Mateus Landeros  : 1965  MRN: [id number]    Date of Service: 2021    Discharge Recommendations:  Patient would benefit from continued therapy after discharge  OT Equipment Recommendations  Equipment Needed: Yes  Mobility Devices: Canes  Cane: Straight Cane    Assessment   Performance deficits / Impairments: Decreased fine motor control;Decreased coordination;Decreased ADL status; Decreased high-level IADLs  Prognosis: Good  Patient Education: OT POC, transfer safety, importance of participation in therapy, compensatory techniques for independence with ADL/IADLs with fair return. REQUIRES OT FOLLOW UP: Yes  Activity Tolerance  Activity Tolerance: Patient Tolerated treatment well  Safety Devices  Safety Devices in place: Yes  Type of devices: Call light within reach; Left in bed;Nurse notified         Patient Diagnosis(es): The primary encounter diagnosis was Tenosynovitis of finger. Diagnoses of Felon of finger and Flexor tenosynovitis of finger were also pertinent to this visit.       has a past medical history of Acid reflux, Acute cystitis with hematuria, Acute non-recurrent maxillary sinusitis, Asthma, Bipolar 1 disorder ([address] Bipolar disorder, mixed ([address] BMI 34.0-34.9,adult, Cannabis use disorder, severe, dependence ([address] Cerebrovascular accident (CVA) ([address] Chest pain, Chronic renal insufficiency, Cocaine abuse ([address] COVID-19 virus RNA test result unknown, DDD (degenerative disc disease), cervical, Diabetes mellitus ([address] Dizziness, Fibromyalgia, History of stroke, Homicidal ideation, Hyperglycemia, Hypertension, Hypotension, IDDM (insulin dependent diabetes mellitus), Lupus ([address] Migraine, Neuropathy, Neuropathy, Polysubstance abuse ([address] Post traumatic stress disorder (PTSD), Posttraumatic stress disorder, Recurrent depression ([address] Recurrent major depressive disorder, in partial remission ([address] Screening mammogram, encounter for, Severe recurrent major depression with psychotic features ([facility] Severe recurrent major depression without psychotic features ([facility] Stroke (cerebrum) ([facility] Stroke ([facility] Suicidal ideation, Suicidal intent, Vitamin D deficiency, and White matter changes. has a past surgical history that includes Abscess Drainage; chest tube insertion; Abdomen surgery; Cataract removal with implant (Bilateral); LASIK (Bilateral); Finger amputation (Left, 03/13/2021); Hand surgery (Right, 09/14/2021); Finger surgery (Right, 09/15/2021); and Hand surgery (Right, 9/15/2021). Restrictions  Restrictions/Precautions  Restrictions/Precautions: Fall Risk, Up as Tolerated  Position Activity Restriction  Other position/activity restrictions: Left hand 4th digit PIP amputation, Left foot 4-5 digit amputation. Right hand 2nd digit tenosynovitis of finger s/p debridement with wound dressing applied. Subjective   General  Chart Reviewed: Yes  Patient assessed for rehabilitation services?: Yes  Family / Caregiver Present: No  Diagnosis: tenosynovitis of right 2nd digit s/p debridement  General Comment  Pain Assessment  Pain Assessment: 0-10  Pain Level: 6  Pain Location: Finger (Comment which one); Hand  Pain Orientation: Right  Pain Descriptors: Aching;Discomfort;Sore; Throbbing  Pain Frequency: Continuous  Non-Pharmaceutical Pain Intervention(s): Elevation; Emotional support;Repositioned;Rest;Therapeutic presence  Vital Signs  Patient Currently in Pain: Yes   Orientation  Orientation  Overall Orientation Status: Within Functional Limits  Objective    ADL  Feeding: Beverage management; Independent;Setup  Grooming: Setup; Increased time to complete;Stand by assistance (Oral care standing at sink.)  UE Bathing: Setup; Increased time to complete;Stand by assistance (Max A for back standing at sink d/t limited reach.)  UE Dressing: Contact guard assistance;Setup (To manage gown.)  Toileting: Setup;Stand by assistance; Increased time to complete ([facility] standing at sink.)  Additional Comments: Pt transferred to standing at sink to complete ADL care, surgical soap used appropriately. Pt educated on compensatory techniques to complete ADL care d/t pt being R handed, pt verbalized/demo understanding. Balance  Sitting Balance: Independent (Seated EOB.)  Standing Balance: Supervision  Standing Balance  Time: 25 minutes  Activity: Functional mobility to/from bathroom w/o device, ADL care standing at sink. Functional Mobility  Functional - Mobility Device: No device  Activity: To/from bathroom  Assist Level: Supervision  Bed mobility  Supine to Sit: Modified independent  Scooting: Independent  Comment: Pt seated EOB at end of session. Transfers  Sit to stand: Supervision  Stand to sit: Supervision  Exercise: Pt unable to complete ROM digits R hand d/t fingers encased in dressings. Cognition  Overall Cognitive Status: [facility]  Cognition Comment: Pt mildly impulsive. Plan   Plan  Times per week: 1-2 times  AM-PAC Score        AM-[facility] Inpatient Daily Activity Raw Score: 19 (09/22/21 1522)  AM-PAC Inpatient ADL T-Scale Score : 40.22 (09/22/21 1522)  ADL Inpatient CMS 0-100% Score: 42.8 (09/22/21 1522)  ADL Inpatient CMS G-Code Modifier : CK (09/22/21 1522)    Goals  Short term goals  Time Frame for Short term goals: By discharge, Pt will  Short term goal 1: Demonstrate 2 compensatory strategies and adaptive techniques to perform ADL tasks as a result of decreased [address] and use of R hand. Short term goal 2: Demonstrate AROM of 3-5 digits in right hand for decreased risk of contracture formation and verbalize understanding of completing movements hourly throughout the day.   Patient Goals   Patient goals : be able to get home       Therapy Time   Individual Concurrent Group Co-treatment   Time In 1420         Time Out 1505         Minutes 45         Timed Code Treatment Minutes: 45 Minutes     Pt supine in bed upon therapist arrival. Pleasant and agreeable to therapy. See above for LOF for all tasks. Pt retired to seated EOB at end of session with call light within reach.     APARNA Mullins/RUBINA

## 2021-09-23 ENCOUNTER — CARE COORDINATION (OUTPATIENT)
Dept: CARE COORDINATION | Age: 56
End: 2021-09-23

## 2021-09-23 LAB
ABSOLUTE EOS #: 0.12 K/UL (ref 0–0.4)
ABSOLUTE IMMATURE GRANULOCYTE: 0 K/UL (ref 0–0.3)
ABSOLUTE LYMPH #: 2.67 K/UL (ref 1–4.8)
ABSOLUTE MONO #: 0.81 K/UL (ref 0.1–0.8)
ANION GAP SERPL CALCULATED.3IONS-SCNC: 13 MMOL/L (ref 9–17)
BASOPHILS # BLD: 0 % (ref 0–2)
BASOPHILS ABSOLUTE: 0 K/UL (ref 0–0.2)
BUN BLDV-MCNC: 10 MG/DL (ref 6–20)
BUN/CREAT BLD: ABNORMAL (ref 9–20)
CALCIUM SERPL-MCNC: 9.1 MG/DL (ref 8.6–10.4)
CHLORIDE BLD-SCNC: 105 MMOL/L (ref 98–107)
CO2: 24 MMOL/L (ref 20–31)
CREAT SERPL-MCNC: 0.79 MG/DL (ref 0.5–0.9)
DIFFERENTIAL TYPE: ABNORMAL
EOSINOPHILS RELATIVE PERCENT: 2 % (ref 1–4)
GFR AFRICAN AMERICAN: >60 ML/MIN
GFR NON-AFRICAN AMERICAN: >60 ML/MIN
GFR SERPL CREATININE-BSD FRML MDRD: ABNORMAL ML/MIN/{1.73_M2}
GFR SERPL CREATININE-BSD FRML MDRD: ABNORMAL ML/MIN/{1.73_M2}
GLUCOSE BLD-MCNC: 157 MG/DL (ref 65–105)
GLUCOSE BLD-MCNC: 161 MG/DL (ref 70–99)
GLUCOSE BLD-MCNC: 177 MG/DL (ref 65–105)
GLUCOSE BLD-MCNC: 185 MG/DL (ref 65–105)
GLUCOSE BLD-MCNC: 185 MG/DL (ref 65–105)
HCT VFR BLD CALC: 32.1 % (ref 36.3–47.1)
HEMOGLOBIN: 9 G/DL (ref 11.9–15.1)
IMMATURE GRANULOCYTES: 0 %
LYMPHOCYTES # BLD: 43 % (ref 24–44)
MCH RBC QN AUTO: 30.6 PG (ref 25.2–33.5)
MCHC RBC AUTO-ENTMCNC: 28 G/DL (ref 28.4–34.8)
MCV RBC AUTO: 109.2 FL (ref 82.6–102.9)
MONOCYTES # BLD: 13 % (ref 1–7)
MORPHOLOGY: ABNORMAL
NRBC AUTOMATED: 0.8 PER 100 WBC
PDW BLD-RTO: 14.2 % (ref 11.8–14.4)
PLATELET # BLD: 478 K/UL (ref 138–453)
PLATELET ESTIMATE: ABNORMAL
PMV BLD AUTO: 9.9 FL (ref 8.1–13.5)
POTASSIUM SERPL-SCNC: 4.3 MMOL/L (ref 3.7–5.3)
RBC # BLD: 2.94 M/UL (ref 3.95–5.11)
RBC # BLD: ABNORMAL 10*6/UL
SEG NEUTROPHILS: 42 % (ref 36–65)
SEGMENTED NEUTROPHILS ABSOLUTE COUNT: 2.6 K/UL (ref 1.8–7.7)
SODIUM BLD-SCNC: 142 MMOL/L (ref 135–144)
WBC # BLD: 6.2 K/UL (ref 3.5–11.3)
WBC # BLD: ABNORMAL 10*3/UL

## 2021-09-23 PROCEDURE — 82947 ASSAY GLUCOSE BLOOD QUANT: CPT

## 2021-09-23 PROCEDURE — 99232 SBSQ HOSP IP/OBS MODERATE 35: CPT | Performed by: INTERNAL MEDICINE

## 2021-09-23 PROCEDURE — 97116 GAIT TRAINING THERAPY: CPT

## 2021-09-23 PROCEDURE — 87186 SC STD MICRODIL/AGAR DIL: CPT

## 2021-09-23 PROCEDURE — 6370000000 HC RX 637 (ALT 250 FOR IP)

## 2021-09-23 PROCEDURE — 76937 US GUIDE VASCULAR ACCESS: CPT

## 2021-09-23 PROCEDURE — 6370000000 HC RX 637 (ALT 250 FOR IP): Performed by: PLASTIC SURGERY

## 2021-09-23 PROCEDURE — 2500000003 HC RX 250 WO HCPCS

## 2021-09-23 PROCEDURE — 87075 CULTR BACTERIA EXCEPT BLOOD: CPT

## 2021-09-23 PROCEDURE — 1200000000 HC SEMI PRIVATE

## 2021-09-23 PROCEDURE — 6370000000 HC RX 637 (ALT 250 FOR IP): Performed by: STUDENT IN AN ORGANIZED HEALTH CARE EDUCATION/TRAINING PROGRAM

## 2021-09-23 PROCEDURE — 87070 CULTURE OTHR SPECIMN AEROBIC: CPT

## 2021-09-23 PROCEDURE — 87205 SMEAR GRAM STAIN: CPT

## 2021-09-23 PROCEDURE — 87077 CULTURE AEROBIC IDENTIFY: CPT

## 2021-09-23 PROCEDURE — 6370000000 HC RX 637 (ALT 250 FOR IP): Performed by: INTERNAL MEDICINE

## 2021-09-23 PROCEDURE — 85025 COMPLETE CBC W/AUTO DIFF WBC: CPT

## 2021-09-23 PROCEDURE — 97110 THERAPEUTIC EXERCISES: CPT

## 2021-09-23 PROCEDURE — 80048 BASIC METABOLIC PNL TOTAL CA: CPT

## 2021-09-23 PROCEDURE — 2580000003 HC RX 258: Performed by: PLASTIC SURGERY

## 2021-09-23 PROCEDURE — 6360000002 HC RX W HCPCS: Performed by: PODIATRIST

## 2021-09-23 PROCEDURE — 6360000002 HC RX W HCPCS: Performed by: STUDENT IN AN ORGANIZED HEALTH CARE EDUCATION/TRAINING PROGRAM

## 2021-09-23 PROCEDURE — 36415 COLL VENOUS BLD VENIPUNCTURE: CPT

## 2021-09-23 RX ORDER — LORAZEPAM 2 MG/ML
1 INJECTION INTRAMUSCULAR ONCE
Status: COMPLETED | OUTPATIENT
Start: 2021-09-23 | End: 2021-09-23

## 2021-09-23 RX ORDER — POLYETHYLENE GLYCOL 3350 17 G/17G
17 POWDER, FOR SOLUTION ORAL ONCE
Status: DISCONTINUED | OUTPATIENT
Start: 2021-09-23 | End: 2021-09-24 | Stop reason: HOSPADM

## 2021-09-23 RX ORDER — LANOLIN ALCOHOL/MO/W.PET/CERES
3 CREAM (GRAM) TOPICAL NIGHTLY PRN
Status: DISCONTINUED | OUTPATIENT
Start: 2021-09-23 | End: 2021-09-24 | Stop reason: HOSPADM

## 2021-09-23 RX ORDER — LIDOCAINE HYDROCHLORIDE 10 MG/ML
INJECTION, SOLUTION INFILTRATION; PERINEURAL
Status: COMPLETED
Start: 2021-09-23 | End: 2021-09-23

## 2021-09-23 RX ORDER — LIDOCAINE HYDROCHLORIDE 10 MG/ML
20 INJECTION, SOLUTION INFILTRATION; PERINEURAL ONCE
Status: COMPLETED | OUTPATIENT
Start: 2021-09-23 | End: 2021-09-23

## 2021-09-23 RX ORDER — FENTANYL CITRATE 50 UG/ML
50 INJECTION, SOLUTION INTRAMUSCULAR; INTRAVENOUS ONCE
Status: COMPLETED | OUTPATIENT
Start: 2021-09-23 | End: 2021-09-23

## 2021-09-23 RX ADMIN — FENTANYL CITRATE 25 MCG: 50 INJECTION, SOLUTION INTRAMUSCULAR; INTRAVENOUS at 18:44

## 2021-09-23 RX ADMIN — LORAZEPAM 1 MG: 2 INJECTION INTRAMUSCULAR; INTRAVENOUS at 18:46

## 2021-09-23 RX ADMIN — OXYCODONE HYDROCHLORIDE 5 MG: 5 TABLET ORAL at 13:27

## 2021-09-23 RX ADMIN — Medication 3 MG: at 20:56

## 2021-09-23 RX ADMIN — OXYCODONE HYDROCHLORIDE 5 MG: 5 TABLET ORAL at 17:34

## 2021-09-23 RX ADMIN — SODIUM CHLORIDE, PRESERVATIVE FREE 10 ML: 5 INJECTION INTRAVENOUS at 20:57

## 2021-09-23 RX ADMIN — BACLOFEN 10 MG: 10 TABLET ORAL at 15:41

## 2021-09-23 RX ADMIN — OXYCODONE HYDROCHLORIDE 5 MG: 5 TABLET ORAL at 08:49

## 2021-09-23 RX ADMIN — INSULIN LISPRO 1 UNITS: 100 INJECTION, SOLUTION INTRAVENOUS; SUBCUTANEOUS at 20:58

## 2021-09-23 RX ADMIN — INSULIN GLARGINE 20 UNITS: 100 INJECTION, SOLUTION SUBCUTANEOUS at 08:54

## 2021-09-23 RX ADMIN — INSULIN LISPRO 2 UNITS: 100 INJECTION, SOLUTION INTRAVENOUS; SUBCUTANEOUS at 17:36

## 2021-09-23 RX ADMIN — OXYCODONE HYDROCHLORIDE 5 MG: 5 TABLET ORAL at 21:35

## 2021-09-23 RX ADMIN — AMOXICILLIN 500 MG: 500 CAPSULE ORAL at 06:33

## 2021-09-23 RX ADMIN — FLUCONAZOLE 400 MG: 200 TABLET ORAL at 08:46

## 2021-09-23 RX ADMIN — PANTOPRAZOLE SODIUM 20 MG: 20 TABLET, DELAYED RELEASE ORAL at 20:57

## 2021-09-23 RX ADMIN — ACETAMINOPHEN 650 MG: 325 TABLET ORAL at 13:27

## 2021-09-23 RX ADMIN — FENTANYL CITRATE 25 MCG: 50 INJECTION, SOLUTION INTRAMUSCULAR; INTRAVENOUS at 13:45

## 2021-09-23 RX ADMIN — FENTANYL CITRATE 25 MCG: 50 INJECTION, SOLUTION INTRAMUSCULAR; INTRAVENOUS at 18:19

## 2021-09-23 RX ADMIN — BACLOFEN 10 MG: 10 TABLET ORAL at 08:46

## 2021-09-23 RX ADMIN — PREGABALIN 100 MG: 100 CAPSULE ORAL at 20:56

## 2021-09-23 RX ADMIN — PREGABALIN 100 MG: 100 CAPSULE ORAL at 08:46

## 2021-09-23 RX ADMIN — TRAZODONE HYDROCHLORIDE 150 MG: 100 TABLET ORAL at 20:56

## 2021-09-23 RX ADMIN — INSULIN GLARGINE 20 UNITS: 100 INJECTION, SOLUTION SUBCUTANEOUS at 20:58

## 2021-09-23 RX ADMIN — OXYCODONE HYDROCHLORIDE 5 MG: 5 TABLET ORAL at 04:43

## 2021-09-23 RX ADMIN — LIDOCAINE HYDROCHLORIDE: 10 INJECTION, SOLUTION INFILTRATION; PERINEURAL at 19:00

## 2021-09-23 RX ADMIN — ACETAMINOPHEN 650 MG: 325 TABLET ORAL at 04:43

## 2021-09-23 RX ADMIN — AMOXICILLIN 500 MG: 500 CAPSULE ORAL at 20:57

## 2021-09-23 RX ADMIN — INSULIN LISPRO 2 UNITS: 100 INJECTION, SOLUTION INTRAVENOUS; SUBCUTANEOUS at 13:28

## 2021-09-23 RX ADMIN — VENLAFAXINE HYDROCHLORIDE 150 MG: 150 CAPSULE, EXTENDED RELEASE ORAL at 08:46

## 2021-09-23 RX ADMIN — AMOXICILLIN 500 MG: 500 CAPSULE ORAL at 15:40

## 2021-09-23 RX ADMIN — INSULIN LISPRO 2 UNITS: 100 INJECTION, SOLUTION INTRAVENOUS; SUBCUTANEOUS at 08:53

## 2021-09-23 RX ADMIN — BACLOFEN 10 MG: 10 TABLET ORAL at 20:56

## 2021-09-23 RX ADMIN — ACETAMINOPHEN 650 MG: 325 TABLET ORAL at 21:35

## 2021-09-23 RX ADMIN — PANTOPRAZOLE SODIUM 20 MG: 20 TABLET, DELAYED RELEASE ORAL at 06:33

## 2021-09-23 RX ADMIN — SODIUM CHLORIDE, PRESERVATIVE FREE 10 ML: 5 INJECTION INTRAVENOUS at 13:47

## 2021-09-23 ASSESSMENT — PAIN SCALES - GENERAL
PAINLEVEL_OUTOF10: 10

## 2021-09-23 ASSESSMENT — ENCOUNTER SYMPTOMS
CHEST TIGHTNESS: 0
COUGH: 0
EYE REDNESS: 0
COLOR CHANGE: 1
ABDOMINAL DISTENTION: 0
SORE THROAT: 0
EYE PAIN: 0
PHOTOPHOBIA: 0
APNEA: 0
SHORTNESS OF BREATH: 0

## 2021-09-23 ASSESSMENT — PAIN DESCRIPTION - PROGRESSION: CLINICAL_PROGRESSION: RAPIDLY WORSENING

## 2021-09-23 ASSESSMENT — PAIN DESCRIPTION - ONSET: ONSET: AWAKENED FROM SLEEP

## 2021-09-23 ASSESSMENT — PAIN DESCRIPTION - ORIENTATION: ORIENTATION: RIGHT

## 2021-09-23 ASSESSMENT — PAIN - FUNCTIONAL ASSESSMENT: PAIN_FUNCTIONAL_ASSESSMENT: ACTIVITIES ARE NOT PREVENTED

## 2021-09-23 ASSESSMENT — PAIN DESCRIPTION - FREQUENCY: FREQUENCY: CONTINUOUS

## 2021-09-23 ASSESSMENT — PAIN DESCRIPTION - LOCATION: LOCATION: HAND

## 2021-09-23 ASSESSMENT — PAIN DESCRIPTION - DESCRIPTORS: DESCRIPTORS: PATIENT UNABLE TO DESCRIBE

## 2021-09-23 ASSESSMENT — PAIN DESCRIPTION - PAIN TYPE: TYPE: ACUTE PAIN;SURGICAL PAIN

## 2021-09-23 NOTE — PROGRESS NOTES
[facility] -   Infectious diseases evaluation  admission date 9/14/2021    reason for consultation:   Hand infection    Impression :   Current:  · DKA  · DM on insulin  · SLE  · Depressed  · Right 2nd finger infected soft tissues  ·  and open ulcer w drain age x 2 weeks pTA  · Right hand cellultiis    Other:  ·   Discussion / summary of stay / plan of care   ·   Recommendations   · Avoid antibiotics that would affect the kidneys and the antibiotic was DKA  · Stop zosyn -amoxicillin, keep 21 days at DC  · fluconazole 9/16 - 9/17  · 9/17 switch to Vfend and ask for preauth  · 9/20 back to fluconazole 400 high dose daily 21 days for tendinitis R hand and soft tissue infection  · FU LFT 2 x per week  · Finger at risk for amputation - disc w pt  · Asking plastic surgery to have a look due to persistent swelling and pain    reconsiled   FU office 3 weeks    Infection Control Recommendations   · [place] Precautions  · Contact Isolation       Antimicrobial Stewardship Recommendations   · Simplification of therapy  · Targeted therapy  · Per Kg dosing      Coordination ofOutpatient Care:   · Estimated Length of IV antimicrobials:  · Patient will need Midline / picc Catheter Insertion:   · Patient will need SNF:  · Patient will need outpatient wound care:     History of Present Illness:   Initial history:  Madhuri Kraft is a 49y.o.-year-old female   Presented with right second finger pain and progressive discoloration found to have an abscess over the right second finger associated with some redness over the palm in the same hand, increased pain, patient presented with DKA, area was lanced and packed, patient seen for evaluation of antibiotics  She is emotional, depressed, lost her son her daughter and her  to the short period of time.                 Interval changes  9/23/2021   Patient Vitals for the past 8 hrs:   BP Temp Temp src Pulse Resp SpO2   09/23/21 0802 -- -- -- -- -- 95 %   09/23/21 0801 -- -- -- -- -- 93 %   09/23/21 0759 137/79 98.2 °F (36.8 °C) Oral 76 -- 96 %   09/23/21 0439 126/78 -- -- 86 22 96 %     No fever - no chills  Right hand pain ++ and pt re considering possible early surg to thew right index  Cx w candida glabrata -from the finger    Chest x-ray negative, WBC 8  Drug screen positive for cocaine and benzodiazepine  Creatinine 1.09    9/22 - cx from the hand now  Normal randi and C glabrata  DC in  the plans    9/23 - Patient knows possibility of amputation. Wants to be discharged. Complains of pain in hand and radiating to forearm. Summary of relevant labs:  Labs:  WBC 6.2  Creat normal    Micro:  BC  Wound cx GPR normal randi  OR cx  canddia glabrata  Imaging:  R hand xray 9/14/21  Marked soft tissue swelling involving the 2nd finger and dorsum of the hand. No acute osseous abnormality evident       I have personally reviewed the past medical history, past surgical history, medications, social history, and family history, and I haveupdated the database accordingly. Allergies:   Bactrim [sulfamethoxazole-trimethoprim] and Adhesive tape     Review of Systems:     Review of Systems   Constitutional: Negative for activity change, fatigue and fever. HENT: Negative for congestion and sore throat. Eyes: Negative for photophobia, pain, redness and visual disturbance. Respiratory: Negative for apnea, cough, chest tightness and shortness of breath. Cardiovascular: Negative for chest pain and leg swelling. Gastrointestinal: Negative for abdominal distention. Endocrine: Negative for polyphagia and polyuria. Genitourinary: Negative for dysuria, flank pain, frequency and urgency. Musculoskeletal: Positive for arthralgias. Skin: Positive for color change and wound. Allergic/Immunologic: Negative for immunocompromised state. Neurological: Negative for dizziness, tremors, seizures, syncope, light-headedness and numbness. Hematological: Negative for adenopathy. Psychiatric/Behavioral: Negative for agitation. Physical Examination :       Physical Exam  Constitutional:       General: She is not in acute distress. Appearance: Normal appearance. HENT:      Head: Normocephalic and atraumatic. Nose: Nose normal.      Mouth/Throat:      Mouth: Mucous membranes are moist.   Eyes:      General: No scleral icterus. Conjunctiva/sclera: Conjunctivae normal.      Pupils: Pupils are equal, round, and reactive to light. Cardiovascular:      Rate and Rhythm: Normal rate and regular rhythm. Heart sounds: Normal heart sounds. No murmur heard. Pulmonary:      Effort: No respiratory distress. Breath sounds: Normal breath sounds. No rhonchi. Abdominal:      General: There is no distension. Palpations: Abdomen is soft. Tenderness: There is no abdominal tenderness. Genitourinary:     Comments: No cullen  Musculoskeletal:         General: Deformity present. No swelling, tenderness or signs of injury. Cervical back: Neck supple. No rigidity or tenderness. Right lower leg: No edema. Left lower leg: No edema. Skin:     General: Skin is dry. Coloration: Skin is not jaundiced. Findings: Erythema and lesion present. Neurological:      Mental Status: She is alert and oriented to person, place, and time. Cranial Nerves: No cranial nerve deficit. Psychiatric:         Mood and Affect: Mood normal.         Thought Content:  Thought content normal.         Past Medical History:     Past Medical History:   Diagnosis Date    Acid reflux 5/29/2017    Acute cystitis with hematuria 10/11/2016    Acute non-recurrent maxillary sinusitis 11/28/2017    Asthma     Bipolar 1 disorder ([facility] 1/4/2018    Bipolar disorder, mixed ([facility]     BMI 34.0-34.9,adult 11/28/2017    Cannabis use disorder, severe, dependence ([facility] 12/19/2017    Cerebrovascular accident (CVA) ([facility] [facility] 6/14/2017    Chest pain 11/5/2016    Chronic renal insufficiency     Cocaine abuse ([address] 1/5/2018    COVID-19 virus RNA test result unknown     DDD (degenerative disc disease), cervical     Diabetes mellitus ([address]     Dizziness 6/13/2017    Fibromyalgia     History of stroke 9/9/2017    Homicidal ideation 11/6/2017    Hyperglycemia     Hypertension     Hypotension 1/18/2019    IDDM (insulin dependent diabetes mellitus) 12/21/2015    Lupus ([address]     Migraine     Neuropathy     Neuropathy     Polysubstance abuse ([address] 9/17/2017    Post traumatic stress disorder (PTSD)     Posttraumatic stress disorder 5/29/2017    Recurrent depression ([address] 5/29/2017    Recurrent major depressive disorder, in partial remission ([address] 11/28/2017    Screening mammogram, encounter for 11/28/2017    Severe recurrent major depression with psychotic features ([address] 12/19/2017    Severe recurrent major depression without psychotic features ([address] 12/19/2017    Stroke (cerebrum) ([address] 6/14/2017    Stroke ([address]     per chart notes    Suicidal ideation     Suicidal intent 3/10/2017    Vitamin D deficiency 11/28/2017    White matter changes 6/13/2017       Past Surgical  History:     Past Surgical History:   Procedure Laterality Date    ABDOMEN SURGERY      drain tube    ABSCESS DRAINAGE      right buttock    CATARACT REMOVAL WITH IMPLANT Bilateral     CHEST TUBE INSERTION      FINGER AMPUTATION Left 03/13/2021    LEFT RING FINER AMPUTATION performed by Martin Lai MD at [facility] Right 09/15/2021     I&D INDEX FINGER     HAND SURGERY Right 09/14/2021    I&D INDEX FINGER performed by Martin Lai MD at [address] Right 9/15/2021    I&D INDEX FINGER performed by Martin Lai MD at [facility] Bilateral        Medications:      pregabalin  100 mg Oral BID    baclofen  10 mg Oral TID    fluconazole  400 mg Oral Daily    insulin glargine  20 Units SubCUTAneous BID    enoxaparin  40 mg SubCUTAneous Daily    amoxicillin  500 mg Oral 3 times per day    insulin lispro  0-12 Units SubCUTAneous TID WC    insulin lispro  0-6 Units SubCUTAneous Nightly    sodium chloride  1,000 mL IntraVENous Once    budesonide-formoterol  2 puff Inhalation BID    pantoprazole  20 mg Oral BID AC    traZODone  150 mg Oral Nightly    venlafaxine  150 mg Oral Daily with breakfast    sodium chloride flush  5-40 mL IntraVENous 2 times per day       Social History:     Social History     Socioeconomic History    Marital status: Legally      Spouse name: Not on file    Number of children: Not on file    Years of education: Not on file    Highest education level: Not on file   Occupational History    Not on file   Tobacco Use    Smoking status: Never Smoker    Smokeless tobacco: Never Used   Vaping Use    Vaping Use: Never used   Substance and Sexual Activity    Alcohol use: Not Currently    Drug use: Yes     Types: Marijuana, Cocaine     Comment: + Cocaine 2/2021 also, see Care Everywhere UDS    Sexual activity: Not Currently     Partners: Male   Other Topics Concern    Not on file   Social History Narrative    Not on file     Social Determinants of Health     Financial Resource Strain: High Risk    Difficulty of Paying Living Expenses: Hard   Food Insecurity: Food Insecurity Present    Worried About Running Out of Food in the Last Year: Often true    Kelin of Food in the Last Year: Often true   Transportation Needs: Unmet Transportation Needs    Lack of Transportation (Medical):  Yes    Lack of Transportation (Non-Medical): Yes   Physical Activity: Inactive    Days of Exercise per Week: 0 days    Minutes of Exercise per Session: 0 min   Stress: Stress Concern Present    Feeling of Stress : Rather much   Social Connections:     Frequency of Communication with Friends and Family:     Frequency of Social Gatherings with Friends and Family:     Attends Congregation Services:     Active Member of Clubs or Organizations:     Attends Club or Organization Meetings:     Marital Status:    Intimate Partner Violence:     Fear of Current or Ex-Partner:     Emotionally Abused:     Physically Abused:     Sexually Abused:        Family History:     Family History   Problem Relation Age of Onset    Diabetes Mother     Stroke Mother     Diabetes Father     Diabetes Sister     Diabetes Brother     Other Son         GSW    No Known Problems Sister       Medical Decision Making:   I have independently reviewed/ordered the following labs:    CBC with Differential:   Recent Labs     09/22/21  0630 09/23/21  0529   WBC 8.0 6.2   HGB 8.7* 9.0*   HCT 30.6* 32.1*   * 478*   LYMPHOPCT 42 43   MONOPCT 12 13*     BMP:  Recent Labs     09/22/21  0630 09/23/21  0529    142   K 4.1 4.3    105   CO2 24 24   BUN 6 10   CREATININE 0.79 0.79     Hepatic Function Panel:   Recent Labs     09/20/21  1029 09/22/21  0630   PROT 6.8 7.5   LABALBU 2.7* 2.9*   BILIDIR <0.08 <0.08   IBILI CANNOT BE CALCULATED CANNOT BE CALCULATED   BILITOT 0.18* 0.16*   ALKPHOS 103 100   ALT 5 6   AST 12 21     No results for input(s): RPR in the last 72 hours. No results for input(s): HIV in the last 72 hours. No results for input(s): BC in the last 72 hours. Lab Results   Component Value Date    CREATININE 0.79 09/23/2021    GLUCOSE 161 09/23/2021       Detailed results: Thank you for allowing us to participate in the care of this patient. Please call with questions. This note is created with the assistance of a speech recognition program.  While intending to generate adocument that actually reflects the content of the visit, the document can still have some errors including those of syntax and sound a like substitutions which may escape proof reading. It such instances, actual meaningcan be extrapolated by contextual diversion.     Skylar Carrasquillo  Office: [phone]  Perfect serve / office [phone]

## 2021-09-23 NOTE — PROGRESS NOTES
Physical Therapy  Facility/Department: [facility]  Daily Treatment Note  NAME: Brandon Haney  : 1965  MRN: [id number]    Date of Service: 2021    Discharge Recommendations:  Patient would benefit from continued therapy after discharge        Assessment     Body structures, Functions, Activity limitations: Decreased functional mobility ; Decreased endurance;Decreased balance;Decreased strength;Decreased posture;Decreased safe awareness     Assessment: Pt presents with + wound on dominant hand w/ inability to use due to excessive pain. Pt also w/ deficits of jean-pierre LE neuropathy so advanced that patient often needs to \"look\" at her feet to indicate where they are during exercises/ambuulation. Prognosis: Good  Decision Making: Medium Complexity  Clinical Presentation: evolving  PT Education: General Safety;Precautions; Disease Specific Education  REQUIRES PT FOLLOW UP: Yes  Activity Tolerance  Activity Tolerance: Patient limited by fatigue     Patient Diagnosis(es): The primary encounter diagnosis was Tenosynovitis of finger. Diagnoses of Felon of finger and Flexor tenosynovitis of finger were also pertinent to this visit.      has a past medical history of Acid reflux, Acute cystitis with hematuria, Acute non-recurrent maxillary sinusitis, Asthma, Bipolar 1 disorder ([address] Bipolar disorder, mixed ([address] BMI 34.0-34.9,adult, Cannabis use disorder, severe, dependence ([address] Cerebrovascular accident (CVA) ([address] Chest pain, Chronic renal insufficiency, Cocaine abuse ([address] COVID-19 virus RNA test result unknown, DDD (degenerative disc disease), cervical, Diabetes mellitus ([address] Dizziness, Fibromyalgia, History of stroke, Homicidal ideation, Hyperglycemia, Hypertension, Hypotension, IDDM (insulin dependent diabetes mellitus), Lupus ([address] Migraine, Neuropathy, Neuropathy, Polysubstance abuse ([address] Post traumatic stress disorder (PTSD), Posttraumatic stress disorder, Recurrent depression ([address] Recurrent major depressive disorder, in partial remission ([facility] Screening mammogram, encounter for, Severe recurrent major depression with psychotic features ([facility] Severe recurrent major depression without psychotic features ([facility] [address] [facility] Stroke (cerebrum) ([facility] [address] [facility] Stroke ([facility] [address] [facility] Suicidal ideation, Suicidal intent, Vitamin D deficiency, and White matter changes. has a past surgical history that includes Abscess Drainage; chest tube insertion; Abdomen surgery; Cataract removal with implant (Bilateral); LASIK (Bilateral); Finger amputation (Left, 03/13/2021); Hand surgery (Right, 09/14/2021); Finger surgery (Right, 09/15/2021); and Hand surgery (Right, 9/15/2021). Restrictions  Restrictions/Precautions  Restrictions/Precautions: Fall Risk, Up as Tolerated  Position Activity Restriction  Other position/activity restrictions: Left hand 4th digit PIP amputation, Left foot 4-5 digit amputation. Right hand 2nd digit tenosynovitis of finger s/p debridement with wound dressing applied. Subjective   Pt sitting on the EOB watching TV. Pt c/o 8/10 R hand pain. Per pt meds were given to her. Orientation    Overall Orientation Status: Within Functional Limits  Objective     Transfers  Sit to Stand:  CGA  Stand to sit: Stand by assistance  Bed to Chair: Contact guard assistance  Ambulation  Ambulation?: Yes  Ambulation 1  Surface: level tile  Device: SPC  Comment: Pt is R hand dominant. Pt used SPC in her L Hand. Pt stated she feels safer using it when she walks. Assistance: Minimal assistance  Quality of Gait: gait with abducted stance for ability to maintain balance. Pt had no episode of loss of balance during gait. Gait Deviations: Increased STEPHY; Decreased step length;Decreased step height;Decreased head and trunk rotation; Shuffles  Distance: 250' x 1   Balance  Sitting - Static: Good  Sitting - Dynamic: Good  Standing - Static: Fair;+  Standing - Dynamic: Fair   Balance assessed with SPC.    Ex's  Seated LE exercise program: Long Arc Quads, heel/toe raises, and marches. Reps:20 x each with 2 lb wt on B LE's. AM-PAC Score  AM-PAC Inpatient Mobility without Stair Climbing Raw Score : 15 (09/23/21 1355)  AM-PAC Inpatient without Stair Climbing T-Scale Score : 43.03 (09/23/21 1355)  Mobility Inpatient CMS 0-100% Score: 47.43 (09/23/21 1355)  Mobility Inpatient without Stair CMS G-Code Modifier : CK (09/23/21 1355)     Goals  Short term goals  Time Frame for Short term goals: 12  Short term goal 1: Transfers independnet  Short term goal 2: Amb x 100 ft w/ cane independent  Short term goal 3: Pt issued HEP program and Ind w/ exercises  Short term goal 4: standing balance exercises SBA +1 w/ good safety awareness. Patient Goals   Patient goals : Pt goal is to get her hand healed and to improve strength    Plan    Plan  Times per week: 5x/week  Specific instructions for Next Treatment: balance - standing dynamic/ static. gait. HEP  Current Treatment Recommendations: Strengthening, Neuromuscular Re-education, Home Exercise Program, Cognitive/Perceptual Training, Safety Education & Training, Balance Training, Endurance Training, Patient/Caregiver Education & Training, Functional Mobility Training, Transfer Training, Gait Training, Positioning  Safety Devices  Type of devices:  All fall risk precautions in place, Call light within reach     Therapy Time   Individual Concurrent Group Co-treatment   Time In   115         Time Out   145         Minutes   [address] [place] [address]

## 2021-09-23 NOTE — PROGRESS NOTES
H&P/consult - Plastics Alexandra Munguia     Re: infection right index finger.        Patient reports post-op pain and swelling with a small 'blister' near the base of the right index finger. Only trauma was fall on it about 5 hours ago. Pain developed prior. Past H/O felon requiring distal amputation left long finger earlier in the year.     PMH:     \"spot on lung\"  H/O chest tube. Large abdominal abscess post . NIDDM, states morning glucose runs around 130.     Outpatient Medications            cetirizine (ZYRTEC) 10 MG tablet Take 1 tablet by mouth daily     insulin glargine (LANTUS SOLOSTAR) 100 UNIT/ML injection pen Inject 20 Units into the skin 2 times daily     pregabalin (LYRICA) 75 MG capsule() Take 1 capsule by mouth 2 times daily for 30 days.     pantoprazole (PROTONIX) 20 MG tablet Take 1 tablet by mouth 2 times daily (before meals)     metFORMIN (GLUCOPHAGE) 1000 MG tablet Take 1 tablet by mouth 2 times daily (with meals)     venlafaxine (EFFEXOR XR) 150 MG extended release capsule Take 1 capsule by mouth daily (with breakfast)     dicyclomine (BENTYL) 10 MG capsule Take 1 capsule by mouth 4 times daily     Misc.  Devices MISC 1 pair of dm shoes, 3 accommodated inserts     Insulin Pen Needle ([employer] PEN NEEDLES 31G) 31G X 8 MM MISC 1 each by Does not apply route daily      MG capsule TAKE 1 CAPSULE BY MOUTH TWICE DAILY     traZODone (DESYREL) 150 MG tablet Take 1 tablet by mouth nightly     FREESTYLE LITE strip 1 each by Does not apply route 3 times daily     FreeStyle Lancets MISC 1 each by Does not apply route 3 times daily     Alcohol Swabs (ALCOHOL PREP) 70 % PADS 1 each by Does not apply route as needed (use as needed) Use_____times per day Diagnosis: 250.0 Diabetes ___Insulin-Dependent___Non-Insulin Dependent     albuterol sulfate  (90 Base) MCG/ACT inhaler() Inhale 2 puffs into the lungs every 4 hours as needed for Wheezing     FLOVENT  MCG/ACT inhaler Inhale 2 puffs into the lungs 2 times daily     budesonide-formoterol (SYMBICORT) 80-4.5 MCG/ACT AERO Inhale 2 puffs into the lungs 2 times daily         Allergies:  Bactrim  Adhesive tape     EXAM:  Patient awake and alert, cooperative. HEENT: NC/AT, PERRL, EOMI, mucous membranes are pink and moist.  Chest: breathing nonlabored. Abd: benign. Ext: prev distal amputation left long finger. Right index finger s/p debridement with exposure of distal flexor tendon. Volar blister unroofed in ER and cultured. Extremely tender. Not able to wiggle DIP minimally. Slight area of fluctuance at base of right index finger.     Imp:  Cellulitis/abscess right index finger.             Plan:  Bedside I&D, cultured, ID following  Ativan 2mg for anxiety control, fentnyl  Pt understands high risk of loss of finger with increasing risk proximally including the hand with continued delay/infection   Follow cultures  BID dressing changes saline moist to dry    CONSENT: The patient provided verbal consent for this procedure. PROCEDURE:  The patient was positioned appropriately and the skin over the incision site was prepped with alcohol and draped in a sterile fashion. Local anesthesia was obtained with a metacarpal local block using 1% Lidocaine without epinephrine. An incision was then made over the greatest area of fluctuance and approximately 3 cc of purulent was expressed. Loculations were broken up using forceps and more of the material was able to be expressed. The drainage cavity was then irrigated. Cultured.      The patient tolerated the procedure well.

## 2021-09-23 NOTE — PROGRESS NOTES
[facility]  Internal Medicine Teaching Residency Program  Inpatient Daily Progress Note  ______________________________________________________________________________    Patient: Francisco Meadows  YOB: 1965   [id number]    Acct: [de-identified]     Room: 2019/2019-01  Admit date: 9/14/2021  Today's date: 09/23/21  Number of days in the hospital: 9    SUBJECTIVE   Admitting Diagnosis: DKA (diabetic ketoacidoses) ([facility] [address]  CC: Right index finger pain    Pt examined at bedside. Chart & results reviewed. No acute evnts ovenright. Patient remained afebrile and hemodynamically stable. /78; HR 86, RR 22  Labs- pending  - ID following: Currently on Amoxicillin 500 mg TID and Fluconazole 400 mg daily for 21 days (started 09/20) per ID. Follow-up LFT twice a week; follow-up in office in 3 weeks. - Plastics surgery: Recommended betadine BID dressing changes with PO antibiotics and follow-up outpatient for potential single staged amputation if needed  - Awaiting placement: Referrals sent to  and [facility]       ROS:  Constitutional:  negative for chills, fevers, sweats  Respiratory:  negative for cough, dyspnea on exertion, hemoptysis, shortness of breath, wheezing  Cardiovascular:  negative for chest pain, chest pressure/discomfort, lower extremity edema, palpitations  Gastrointestinal:  negative for abdominal pain, constipation, diarrhea, nausea, vomiting  Neurological:  negative for dizziness, headache    BRIEF HISTORY     The patient is a pleasant 56 y. o. female presents with a chief complaint of right index finger pain. The patient has PMH significant for DM-2, HTN, Bipolar disorder, and polysubstance abuse. The patient reported that she had a fall yesterday and injured her right index finger and since then has had swelling and pain. She denies hitting her head or loss of consciousness. She denies urine or fecal incontinence.  She denies neck pain, chest pain, shortness of breath, abdominal pain. The patient was tearful during the interview and was unable to provide a detailed history. She denies intention of self-harm or suicidal ideation. Plastic surgery was consulted and patient was taken to OR for exploration and drainage of right index finger abscess.      In the ED, the patient was afebrile Temp 98.5F, RR 18, HR 98, /123, SpO2 95%. Labs reviewed: Na 126, K 4.9, bicarb 16, BUN 11, Cr 1.08, Glucose 719, beta-hydroxy 4.42, WBC 9.4, Hgb 10.7, Plt 59, ESR 69, Blood gas: pH 7.283/ pCO2 53.7/ pO2 20.1/ HCO3 24.6Covid negative, wound culture shows gram negative rods. The patient received 10 units of insulin in the ED and was started on DKA protocol with insulin gtt and NS IVF. She was started on Vanc and Zosyn. OBJECTIVE     Vital Signs:  /78   Pulse 86   Temp 98.2 °F (36.8 °C) (Oral)   Resp 22   Ht 5' 6\" (1.676 m)   Wt 236 lb 12.8 oz (107.4 kg)   LMP  (LMP Unknown)   SpO2 96%   BMI 38.22 kg/m²     Temp (24hrs), Av.9 °F (36.6 °C), Min:97.6 °F (36.4 °C), Max:98.2 °F (36.8 °C)    In: 960   Out: -     Physical Exam:  Constitutional: This is a well developed, well nourished, 35-39.9 - Obesity Grade II 49y.o. year old female who is alert, oriented, cooperative and in no apparent distress. Head:normocephalic and atraumatic. EENT:  PERRLA. No conjunctival injections. Septum was midline, mucosa was without erythema, exudates or cobblestoning. No thrush was noted. Neck: Supple without thyromegaly. No elevated JVP. Trachea was midline. Respiratory: Chest was symmetrical without dullness to percussion. Breath sounds bilaterally were clear to auscultation. There were no wheezes, rhonchi or rales. There is no intercostal retraction or use of accessory muscles. No egophony noted. Cardiovascular: Regular without murmur, clicks, gallops or rubs. Abdomen: Slightly rounded and soft without organomegaly.  No rebound, rigidity or guarding was appreciated. Lymphatic: No lymphadenopathy. Musculoskeletal: Normal curvature of the spine. No gross muscle weakness. Extremities:  No lower extremity edema, ulcerations, tenderness, varicosities or erythema. Muscle size, tone and strength are normal.  No involuntary movements are noted. Skin:  Warm and dry. Good color, turgor and pigmentation. No lesions or scars.   No cyanosis or clubbing  Neurological/Psychiatric: The patient's general behavior, level of consciousness, thought content and emotional status is normal.        Medications:  Scheduled Medications:    pregabalin  100 mg Oral BID    baclofen  10 mg Oral TID    fluconazole  400 mg Oral Daily    insulin glargine  20 Units SubCUTAneous BID    enoxaparin  40 mg SubCUTAneous Daily    amoxicillin  500 mg Oral 3 times per day    insulin lispro  0-12 Units SubCUTAneous TID WC    insulin lispro  0-6 Units SubCUTAneous Nightly    sodium chloride  1,000 mL IntraVENous Once    budesonide-formoterol  2 puff Inhalation BID    pantoprazole  20 mg Oral BID AC    traZODone  150 mg Oral Nightly    venlafaxine  150 mg Oral Daily with breakfast    sodium chloride flush  5-40 mL IntraVENous 2 times per day     Continuous Infusions:    dextrose      sodium chloride       PRN MedicationsfentanNYL, 25 mcg, PRN  glucose, 15 g, PRN  dextrose, 12.5 g, PRN  glucagon (rDNA), 1 mg, PRN  dextrose, 100 mL/hr, PRN  albuterol sulfate HFA, 2 puff, Q4H PRN  sodium chloride flush, 5-40 mL, PRN  sodium chloride, 25 mL, PRN  ondansetron, 4 mg, Q8H PRN   Or  ondansetron, 4 mg, Q6H PRN  polyethylene glycol, 17 g, Daily PRN  acetaminophen, 650 mg, Q6H PRN   Or  acetaminophen, 650 mg, Q6H PRN  dextrose, 12.5 g, PRN  oxyCODONE, 5 mg, Q4H PRN        Diagnostic Labs:  CBC:   Recent Labs     09/20/21  1029 09/21/21  1344 09/22/21  0630   WBC 9.1 7.3 8.0   RBC 2.49* 2.56* 2.81*   HGB 7.9* 8.1* 8.7*   HCT 27.7* 27.2* 30.6*   .2* 106.3* 108.9*   RDW 14.2 14.2 14.0    432 478*     BMP:   Recent Labs     09/20/21  1029 09/21/21  1344 09/22/21  0630    140 143   K 4.5 4.1 4.1    104 105   CO2 24 25 24   BUN 9 8 6   CREATININE 0.79 0.88 0.79     BNP: No results for input(s): BNP in the last 72 hours. PT/INR: No results for input(s): PROTIME, INR in the last 72 hours. APTT: No results for input(s): APTT in the last 72 hours. CARDIAC ENZYMES: No results for input(s): CKMB, CKMBINDEX, TROPONINI in the last 72 hours. Invalid input(s): CKTOTAL;3  FASTING LIPID PANEL:  Lab Results   Component Value Date    CHOL 275 (H) 12/30/2020    HDL 80 12/30/2020    TRIG 246 (H) 12/30/2020     LIVER PROFILE:   Recent Labs     09/20/21  1029 09/22/21  0630   AST 12 21   ALT 5 6   BILIDIR <0.08 <0.08   BILITOT 0.18* 0.16*   ALKPHOS 103 100      MICROBIOLOGY:   Lab Results   Component Value Date/Time    CULTURE NO GROWTH 6 DAYS 09/14/2021 03:06 PM       Imaging:    US RENAL COMPLETE    Result Date: 9/17/2021  Unremarkable ultrasound of the kidneys and urinary bladder. Cholelithiasis. ASSESSMENT & PLAN     ASSESSMENT / PLAN:     Principal Problem:    DKA (diabetic ketoacidoses) ([facility])  Active Problems:    Tenosynovitis of finger    Bipolar disorder, mixed ([facility])    Polysubstance abuse ([facility] [address]  Resolved Problems:    * No resolved hospital problems.  *    Tenosynovitis Right index finger  - s/p drainage of abscess per plastics surgery: - Wound culture: gram positive rods, yeast- not candida albicans or candida dubliniensis light growth  - Received 1 dose of Vancomycin and TDAP vaccine shot  - Wound and Abscess culture: many gram positive rods and yeast light growth  - Plastic surgery following: flexor tendon distal to PIP debridement yesterday.  DIP not able to flex the fingers survives.  2 options were discussed with the patient to salvage the finger that will have limited motion.  Patient shows 2 to 3 times daily dressing changes instead of amputation.  Per plastics patient can be discharged when ID feels infection is under control and follow-up with [facility].  - Plastics: Discussed on 09/21: Recommended betadine BID dressing changes with PO antibiotics and follow-up outpatient for potential single staged amputation if needed  - ID following: - ID following: Currently on Amoxicillin 500 mg TID and Fluconazole 400 mg daily for 21 days (started 09/20) per ID. Follow-up LFT twice a week; follow-up in office in 3 weeks.            DKA- resolved; bridged to Lantus  - Glucose 719, beta-hydroxy 4.42, on admission   - blood gas: pH 7.283/ pCO2 53.7/ pO2 20.1/ HCO3 24.6  - bridged to lantus 20 U bid with medium dose ss. - HbA1c 14. 2- non compliant with insulin at home  -[address] [place] [address] sugar: 84<--83<--160<--187; on Lantus 20 units BID; medium dose sliding scale  - Hypoglycemia protocol  - Diabetic diet- patient tolerating diet well.          Polysubstance abuse  - Urine tox: positive for benzodiazepine, cocaine and opiates  - Avoid beta blockers  - Monitor for withdrawal symptoms  - Monitor BP closely  - erratic behavior overnight, patient wanted to leave AMA, refused lab draws on 09/15  - Psychiatry was consulted: low risk of suicide and harm to others, no indicate for sitter admission to psychiatry. Recommend outpatient follow-up          HTN- controlled  - Monitor BP; avoid beta-blockers due to drug abuse history        Thrombocytopenia- TSQ 160- resolved  - likely secondary to infection vs. Drug abuse        MARTIN- Cr 0.79<--0.88 (0.8~0.9) - resolved  - Discontinued IVF  - US Renal: unremarkable kidneys and urinary bladder    DVT ppx : Lovenox  GI ppx: Protonix    PT/OT: On board  Discharge Planning / SW:  on board, referrals sent to Pepito Hess MD  Internal Medicine Resident, PGY-1  [address], [place]  9/23/2021, 5:28 AM

## 2021-09-23 NOTE — CARE COORDINATION
Received call from Killian Morrow at [facility]. They are reviewing    1921 met with pt. Update provided. Notified her that she has to work with PT and OT    [phone] received call from Count includes the [facility] at [place]. They are able to accept. She is awaiting PT note to start precert. They can accept before approval is received as long as it is anticipated that SNF stay will be approved. Voicemail left for PT requesting therapy    [phone] received call from [facility] that Constance Gracia from [facility] completed bedside visit. Pt has been accepted and can be admitted today.  Per Yossi Trevino RN, pt has to see Dr Lowell Mart before she can be discharged

## 2021-09-24 VITALS
WEIGHT: 236.8 LBS | BODY MASS INDEX: 38.06 KG/M2 | HEART RATE: 76 BPM | SYSTOLIC BLOOD PRESSURE: 122 MMHG | TEMPERATURE: 97.5 F | RESPIRATION RATE: 14 BRPM | HEIGHT: 66 IN | DIASTOLIC BLOOD PRESSURE: 80 MMHG | OXYGEN SATURATION: 97 %

## 2021-09-24 LAB
ABSOLUTE EOS #: 0.13 K/UL (ref 0–0.44)
ABSOLUTE IMMATURE GRANULOCYTE: 0.03 K/UL (ref 0–0.3)
ABSOLUTE LYMPH #: 2.27 K/UL (ref 1.1–3.7)
ABSOLUTE MONO #: 0.62 K/UL (ref 0.1–1.2)
ANION GAP SERPL CALCULATED.3IONS-SCNC: 10 MMOL/L (ref 9–17)
BASOPHILS # BLD: 1 % (ref 0–2)
BASOPHILS ABSOLUTE: 0.05 K/UL (ref 0–0.2)
BUN BLDV-MCNC: 12 MG/DL (ref 6–20)
BUN/CREAT BLD: ABNORMAL (ref 9–20)
CALCIUM SERPL-MCNC: 8.9 MG/DL (ref 8.6–10.4)
CHLORIDE BLD-SCNC: 105 MMOL/L (ref 98–107)
CO2: 24 MMOL/L (ref 20–31)
CREAT SERPL-MCNC: 0.67 MG/DL (ref 0.5–0.9)
DIFFERENTIAL TYPE: ABNORMAL
EOSINOPHILS RELATIVE PERCENT: 2 % (ref 1–4)
GFR AFRICAN AMERICAN: >60 ML/MIN
GFR NON-AFRICAN AMERICAN: >60 ML/MIN
GFR SERPL CREATININE-BSD FRML MDRD: ABNORMAL ML/MIN/{1.73_M2}
GFR SERPL CREATININE-BSD FRML MDRD: ABNORMAL ML/MIN/{1.73_M2}
GLUCOSE BLD-MCNC: 129 MG/DL (ref 65–105)
GLUCOSE BLD-MCNC: 139 MG/DL (ref 65–105)
GLUCOSE BLD-MCNC: 178 MG/DL (ref 70–99)
GLUCOSE BLD-MCNC: 269 MG/DL (ref 65–105)
HCT VFR BLD CALC: 28.8 % (ref 36.3–47.1)
HEMOGLOBIN: 8.5 G/DL (ref 11.9–15.1)
IMMATURE GRANULOCYTES: 1 %
LYMPHOCYTES # BLD: 40 % (ref 24–43)
MCH RBC QN AUTO: 32.3 PG (ref 25.2–33.5)
MCHC RBC AUTO-ENTMCNC: 29.5 G/DL (ref 28.4–34.8)
MCV RBC AUTO: 109.5 FL (ref 82.6–102.9)
MONOCYTES # BLD: 11 % (ref 3–12)
NRBC AUTOMATED: 0.5 PER 100 WBC
PDW BLD-RTO: 14.4 % (ref 11.8–14.4)
PLATELET # BLD: 493 K/UL (ref 138–453)
PLATELET ESTIMATE: ABNORMAL
PMV BLD AUTO: 10.2 FL (ref 8.1–13.5)
POTASSIUM SERPL-SCNC: 4.5 MMOL/L (ref 3.7–5.3)
RBC # BLD: 2.63 M/UL (ref 3.95–5.11)
RBC # BLD: ABNORMAL 10*6/UL
SEG NEUTROPHILS: 45 % (ref 36–65)
SEGMENTED NEUTROPHILS ABSOLUTE COUNT: 2.6 K/UL (ref 1.5–8.1)
SODIUM BLD-SCNC: 139 MMOL/L (ref 135–144)
WBC # BLD: 5.7 K/UL (ref 3.5–11.3)
WBC # BLD: ABNORMAL 10*3/UL

## 2021-09-24 PROCEDURE — 99239 HOSP IP/OBS DSCHRG MGMT >30: CPT | Performed by: INTERNAL MEDICINE

## 2021-09-24 PROCEDURE — 99232 SBSQ HOSP IP/OBS MODERATE 35: CPT | Performed by: INTERNAL MEDICINE

## 2021-09-24 PROCEDURE — 82947 ASSAY GLUCOSE BLOOD QUANT: CPT

## 2021-09-24 PROCEDURE — 6370000000 HC RX 637 (ALT 250 FOR IP): Performed by: PLASTIC SURGERY

## 2021-09-24 PROCEDURE — 94640 AIRWAY INHALATION TREATMENT: CPT

## 2021-09-24 PROCEDURE — 6370000000 HC RX 637 (ALT 250 FOR IP): Performed by: INTERNAL MEDICINE

## 2021-09-24 PROCEDURE — 36415 COLL VENOUS BLD VENIPUNCTURE: CPT

## 2021-09-24 PROCEDURE — 85025 COMPLETE CBC W/AUTO DIFF WBC: CPT

## 2021-09-24 PROCEDURE — 6370000000 HC RX 637 (ALT 250 FOR IP)

## 2021-09-24 PROCEDURE — 6370000000 HC RX 637 (ALT 250 FOR IP): Performed by: STUDENT IN AN ORGANIZED HEALTH CARE EDUCATION/TRAINING PROGRAM

## 2021-09-24 PROCEDURE — 2580000003 HC RX 258: Performed by: PLASTIC SURGERY

## 2021-09-24 PROCEDURE — 80048 BASIC METABOLIC PNL TOTAL CA: CPT

## 2021-09-24 PROCEDURE — 6360000002 HC RX W HCPCS: Performed by: STUDENT IN AN ORGANIZED HEALTH CARE EDUCATION/TRAINING PROGRAM

## 2021-09-24 RX ORDER — AMOXICILLIN 500 MG/1
1000 CAPSULE ORAL 2 TIMES DAILY
Qty: 84 CAPSULE | Refills: 0 | Status: ON HOLD | OUTPATIENT
Start: 2021-09-24 | End: 2021-10-12 | Stop reason: HOSPADM

## 2021-09-24 RX ORDER — AMOXICILLIN 500 MG/1
1000 CAPSULE ORAL EVERY 12 HOURS SCHEDULED
Status: DISCONTINUED | OUTPATIENT
Start: 2021-09-24 | End: 2021-09-24 | Stop reason: HOSPADM

## 2021-09-24 RX ADMIN — INSULIN LISPRO 6 UNITS: 100 INJECTION, SOLUTION INTRAVENOUS; SUBCUTANEOUS at 13:11

## 2021-09-24 RX ADMIN — OXYCODONE HYDROCHLORIDE 5 MG: 5 TABLET ORAL at 03:22

## 2021-09-24 RX ADMIN — FENTANYL CITRATE 25 MCG: 50 INJECTION, SOLUTION INTRAMUSCULAR; INTRAVENOUS at 07:15

## 2021-09-24 RX ADMIN — OXYCODONE HYDROCHLORIDE 5 MG: 5 TABLET ORAL at 13:14

## 2021-09-24 RX ADMIN — BACLOFEN 10 MG: 10 TABLET ORAL at 08:24

## 2021-09-24 RX ADMIN — AMOXICILLIN 500 MG: 500 CAPSULE ORAL at 07:12

## 2021-09-24 RX ADMIN — BUDESONIDE AND FORMOTEROL FUMARATE DIHYDRATE 2 PUFF: 80; 4.5 AEROSOL RESPIRATORY (INHALATION) at 08:11

## 2021-09-24 RX ADMIN — SODIUM CHLORIDE, PRESERVATIVE FREE 10 ML: 5 INJECTION INTRAVENOUS at 08:27

## 2021-09-24 RX ADMIN — VENLAFAXINE HYDROCHLORIDE 150 MG: 150 CAPSULE, EXTENDED RELEASE ORAL at 08:26

## 2021-09-24 RX ADMIN — PREGABALIN 100 MG: 100 CAPSULE ORAL at 08:27

## 2021-09-24 RX ADMIN — PANTOPRAZOLE SODIUM 20 MG: 20 TABLET, DELAYED RELEASE ORAL at 08:27

## 2021-09-24 RX ADMIN — FENTANYL CITRATE 25 MCG: 50 INJECTION, SOLUTION INTRAMUSCULAR; INTRAVENOUS at 18:11

## 2021-09-24 RX ADMIN — BACLOFEN 10 MG: 10 TABLET ORAL at 14:00

## 2021-09-24 RX ADMIN — OXYCODONE HYDROCHLORIDE 5 MG: 5 TABLET ORAL at 17:41

## 2021-09-24 RX ADMIN — FLUCONAZOLE 400 MG: 200 TABLET ORAL at 08:26

## 2021-09-24 RX ADMIN — INSULIN GLARGINE 20 UNITS: 100 INJECTION, SOLUTION SUBCUTANEOUS at 08:25

## 2021-09-24 ASSESSMENT — ENCOUNTER SYMPTOMS
EYE ITCHING: 0
APNEA: 0
SHORTNESS OF BREATH: 0
EYE REDNESS: 0
PHOTOPHOBIA: 0
SORE THROAT: 0
EYE PAIN: 0
ABDOMINAL DISTENTION: 0
CHEST TIGHTNESS: 0
EYE DISCHARGE: 0
COLOR CHANGE: 1
COUGH: 0

## 2021-09-24 ASSESSMENT — PAIN DESCRIPTION - FREQUENCY: FREQUENCY: CONTINUOUS

## 2021-09-24 ASSESSMENT — PAIN DESCRIPTION - PROGRESSION: CLINICAL_PROGRESSION: GRADUALLY WORSENING

## 2021-09-24 ASSESSMENT — PAIN SCALES - GENERAL
PAINLEVEL_OUTOF10: 10

## 2021-09-24 ASSESSMENT — PAIN DESCRIPTION - DIRECTION: RADIATING_TOWARDS: HAND

## 2021-09-24 ASSESSMENT — PAIN - FUNCTIONAL ASSESSMENT: PAIN_FUNCTIONAL_ASSESSMENT: PREVENTS OR INTERFERES WITH MANY ACTIVE NOT PASSIVE ACTIVITIES

## 2021-09-24 ASSESSMENT — PAIN DESCRIPTION - ONSET: ONSET: ON-GOING

## 2021-09-24 ASSESSMENT — PAIN DESCRIPTION - LOCATION: LOCATION: HAND

## 2021-09-24 ASSESSMENT — PAIN DESCRIPTION - DESCRIPTORS: DESCRIPTORS: PATIENT UNABLE TO DESCRIBE

## 2021-09-24 ASSESSMENT — PAIN DESCRIPTION - ORIENTATION: ORIENTATION: RIGHT

## 2021-09-24 ASSESSMENT — PAIN DESCRIPTION - PAIN TYPE: TYPE: ACUTE PAIN;SURGICAL PAIN

## 2021-09-24 NOTE — PROGRESS NOTES
[facility]  Internal Medicine Teaching Residency Program  Inpatient Daily Progress Note  ______________________________________________________________________________    Patient: Brandon Haney  YOB: 1965   [id number]:[id number]    Acct: [de-identified]     Room: 2019/2019-01  Admit date: 9/14/2021  Today's date: 09/24/21  Number of days in the hospital: 10    SUBJECTIVE   Admitting Diagnosis: [facility]acidoses[facility]  CC: Right index finger pain    Pt examined at bedside. Chart & results reviewed. No acute events overnight. Patient remained afebrile and hemodynamically stable. ID following: Continue amoxicillin 3 times daily daily for 21 days started on 09/20. Follow-up LFT twice a week; follow-up in office in 3 weeks with ID  Plastic surgery: s/p I&D yesterday; BID dressing changes saline moist to dry; follow-up in Hand clinic in 1 week  - Discharge planning today to [facility]    ROS:  Constitutional:  negative for chills, fevers, sweats  Respiratory:  negative for cough, dyspnea on exertion, hemoptysis, shortness of breath, wheezing  Cardiovascular:  negative for chest pain, chest pressure/discomfort, lower extremity edema, palpitations  Gastrointestinal:  negative for abdominal pain, constipation, diarrhea, nausea, vomiting  Neurological:  negative for dizziness, headache    BRIEF HISTORY     The patient is a pleasant 56 y. o. female presents with a chief complaint of right index finger pain. The patient has PMH significant for DM-2, HTN, Bipolar disorder, and polysubstance abuse. The patient reported that she had a fall yesterday and injured her right index finger and since then has had swelling and pain. She denies hitting her head or loss of consciousness. She denies urine or fecal incontinence. She denies neck pain, chest pain, shortness of breath, abdominal pain.  The patient was tearful during the interview and was unable to provide a detailed history. She denies intention of self-harm or suicidal ideation. Plastic surgery was consulted and patient was taken to OR for exploration and drainage of right index finger abscess.      In the ED, the patient was afebrile Temp 98.5F, RR 18, HR 98, /123, SpO2 95%. Labs reviewed: Na 126, K 4.9, bicarb 16, BUN 11, Cr 1.08, Glucose 719, beta-hydroxy 4.42, WBC 9.4, Hgb 10.7, Plt 59, ESR 69, Blood gas: pH 7.283/ pCO2 53.7/ pO2 20.1/ HCO3 24.6Covid negative, wound culture shows gram negative rods. The patient received 10 units of insulin in the ED and was started on DKA protocol with insulin gtt and NS IVF. She was started on Vanc and Zosyn. OBJECTIVE     Vital Signs:  /71   Pulse 86   Temp 98.5 °F (36.9 °C) (Oral)   Resp 20   Ht 5' 6\" (1.676 m)   Wt 236 lb 12.8 oz (107.4 kg)   LMP  (LMP Unknown)   SpO2 97%   BMI 38.22 kg/m²     Temp (24hrs), Av.2 °F (36.8 °C), Min:97.7 °F (36.5 °C), Max:98.5 °F (36.9 °C)    In: 140   Out: -     Physical Exam:  Constitutional: This is a well developed, well nourished, 35-39.9 - Obesity Grade II 49y.o. year old female who is alert, oriented, cooperative and in no apparent distress. Head:normocephalic and atraumatic. EENT:  PERRLA. No conjunctival injections. Septum was midline, mucosa was without erythema, exudates or cobblestoning. No thrush was noted. Neck: Supple without thyromegaly. No elevated JVP. Trachea was midline. Respiratory: Chest was symmetrical without dullness to percussion. Breath sounds bilaterally were clear to auscultation. There were no wheezes, rhonchi or rales. There is no intercostal retraction or use of accessory muscles. No egophony noted. Cardiovascular: Regular without murmur, clicks, gallops or rubs. Abdomen: Slightly rounded and soft without organomegaly. No rebound, rigidity or guarding was appreciated. Lymphatic: No lymphadenopathy. Musculoskeletal: Normal curvature of the spine.   No gross muscle weakness. Extremities:  No lower extremity edema, ulcerations, tenderness, varicosities or erythema. Muscle size, tone and strength are normal.  No involuntary movements are noted. Skin:  Warm and dry. Good color, turgor and pigmentation. No lesions or scars.   No cyanosis or clubbing  Neurological/Psychiatric: The patient's general behavior, level of consciousness, thought content and emotional status is normal.        Medications:  Scheduled Medications:    polyethylene glycol  17 g Oral Once    pregabalin  100 mg Oral BID    baclofen  10 mg Oral TID    fluconazole  400 mg Oral Daily    insulin glargine  20 Units SubCUTAneous BID    enoxaparin  40 mg SubCUTAneous Daily    amoxicillin  500 mg Oral 3 times per day    insulin lispro  0-12 Units SubCUTAneous TID WC    insulin lispro  0-6 Units SubCUTAneous Nightly    sodium chloride  1,000 mL IntraVENous Once    budesonide-formoterol  2 puff Inhalation BID    pantoprazole  20 mg Oral BID AC    traZODone  150 mg Oral Nightly    venlafaxine  150 mg Oral Daily with breakfast    sodium chloride flush  5-40 mL IntraVENous 2 times per day     Continuous Infusions:    dextrose      sodium chloride       PRN Medicationsmelatonin, 3 mg, Nightly PRN  fentanNYL, 25 mcg, PRN  glucose, 15 g, PRN  dextrose, 12.5 g, PRN  glucagon (rDNA), 1 mg, PRN  dextrose, 100 mL/hr, PRN  albuterol sulfate HFA, 2 puff, Q4H PRN  sodium chloride flush, 5-40 mL, PRN  sodium chloride, 25 mL, PRN  ondansetron, 4 mg, Q8H PRN   Or  ondansetron, 4 mg, Q6H PRN  polyethylene glycol, 17 g, Daily PRN  acetaminophen, 650 mg, Q6H PRN   Or  acetaminophen, 650 mg, Q6H PRN  dextrose, 12.5 g, PRN  oxyCODONE, 5 mg, Q4H PRN        Diagnostic Labs:  CBC:   Recent Labs     09/22/21  0630 09/23/21  0529 09/24/21  0552   WBC 8.0 6.2 5.7   RBC 2.81* 2.94* 2.63*   HGB 8.7* 9.0* 8.5*   HCT 30.6* 32.1* 28.8*   .9* 109.2* 109.5*   RDW 14.0 14.2 14.4   * 478* 493*     BMP:   Recent Labs 09/22/21  0630 09/23/21  0529 09/24/21  0552    142 139   K 4.1 4.3 4.5    105 105   CO2 24 24 24   BUN 6 10 12   CREATININE 0.79 0.79 0.67     BNP: No results for input(s): BNP in the last 72 hours. PT/INR: No results for input(s): PROTIME, INR in the last 72 hours. APTT: No results for input(s): APTT in the last 72 hours. CARDIAC ENZYMES: No results for input(s): CKMB, CKMBINDEX, TROPONINI in the last 72 hours. Invalid input(s): CKTOTAL;3  FASTING LIPID PANEL:  Lab Results   Component Value Date    CHOL 275 (H) 12/30/2020    HDL 80 12/30/2020    TRIG 246 (H) 12/30/2020     LIVER PROFILE:   Recent Labs     09/22/21  0630   AST 21   ALT 6   BILIDIR <0.08   BILITOT 0.16*   ALKPHOS 100      MICROBIOLOGY:   Lab Results   Component Value Date/Time    CULTURE PENDING 09/23/2021 07:22 PM       Imaging:    No results found. ASSESSMENT & PLAN     ASSESSMENT / PLAN:     Principal Problem:    DKA (diabetic ketoacidoses) ([facility])  Active Problems:    Tenosynovitis of finger    Bipolar disorder, mixed ([facility])    Polysubstance abuse ([address]  Resolved Problems:    * No resolved hospital problems.  *      Tenosynovitis Right index finger  - s/p drainage of abscess per plastics surgery: - Wound culture: gram positive rods, yeast- not candida albicans or candida dubliniensis light growth  - Received 1 dose of Vancomycin and TDAP vaccine shot  - Wound and Abscess culture: many gram positive rods and yeast light growth  - Plastic surgery following: flexor tendon distal to PIP debridement yesterday.  DIP not able to flex the fingers survives.  2 options were discussed with the patient to salvage the finger that will have limited motion.  Patient shows 2 to 3 times daily dressing changes instead of amputation.  Per plastics patient can be discharged when ID feels infection is under control and follow-up with [facility].  - Plastics: Discussed on 09/21: Recommended betadine BID dressing changes with PO antibiotics and follow-up outpatient for potential single staged amputation if needed  - 09/24: s/p I&D yesterday; BID dressing changes saline moist to dry; follow-up in Hand clinic in 1 week  - ID following: Currently on Amoxicillin 500 mg TID and Fluconazole 400 mg daily for 21 days (started 09/20) per ID. Follow-up LFT twice a week; follow-up in office in 3 weeks.            DKA- resolved; bridged to Lantus  - Glucose 719, beta-hydroxy 4.42, on admission   - blood gas: pH 7.283/ pCO2 53.7/ pO2 20.1/ HCO3 24.6  - bridged to lantus 20 U bid with medium dose ss. - HbA1c 14. 2- non compliant with insulin at home  - Blood sugar: 129<--178<--185<--185<--177; on Lantus 20 units BID; medium dose sliding scale  - Hypoglycemia protocol  - Diabetic diet- patient tolerating diet well.          Polysubstance abuse  - Urine tox: positive for benzodiazepine, cocaine and opiates  - Avoid beta blockers  - Monitor for withdrawal symptoms  - Monitor BP closely  - erratic behavior overnight, patient wanted to leave AMA, refused lab draws on 09/15  - Psychiatry was consulted: low risk of suicide and harm to others, no indicate for sitter admission to psychiatry. Recommend outpatient follow-up          HTN- controlled- /71 today  - Monitor BP; avoid beta-blockers due to drug abuse history        Thrombocytopenia- JNY 894- resolved  - likely secondary to infection vs. Drug abuse        MARTIN- Cr 0.69 - resolved  - Discontinued IVF  - US Renal: unremarkable kidneys and urinary bladder       DVT ppx : Lovenox  GI ppx: Protonix     PT/OT: On board  Discharge Planning / [place]: Discharge planning today to [facility]- transport arranged      Candy Benites MD  Internal Medicine Resident, PGY-1  [facility];  [place], [place]  9/24/2021, 11:02 AM

## 2021-09-24 NOTE — PROGRESS NOTES
[facility] -   Infectious diseases evaluation  admission date 9/14/2021    reason for consultation:   Hand infection    Impression :   Current:  · DKA  · DM on insulin  · SLE  · Depressed  · Right 2nd finger infected soft tissues  ·  and open ulcer w drain age x 2 weeks pTA  · Right hand cellultiis    Other:  ·   Discussion / summary of stay / plan of care   ·   Recommendations   · Avoid antibiotics that would affect the kidneys and the antibiotic was DKA  · Stop zosyn -amoxicillin, keep 21 days at DC  · fluconazole 9/16  400 high dose daily 21 days for tendinitis R hand and soft tissue infection  · FU LFT 2 x per week  · Finger at risk for amputation - disc w pt  · Pend repeat pus cx 9/23  · Might need to extend antibiotics total of 6 weeks due to concern of underlying osteomyelitis in the long persistence of pus  · Fu OFFICE    reconsiled   FU office 3 weeks    Infection Control Recommendations   · [place] Precautions  · Contact Isolation       Antimicrobial Stewardship Recommendations   · Simplification of therapy  · Targeted therapy  · Per Kg dosing      Coordination ofOutpatient Care:   · Estimated Length of IV antimicrobials:  · Patient will need Midline / picc Catheter Insertion:   · Patient will need SNF:  · Patient will need outpatient wound care:     History of Present Illness:   Initial history:  Germain Upton is a 49y.o.-year-old female   Presented with right second finger pain and progressive discoloration found to have an abscess over the right second finger associated with some redness over the palm in the same hand, increased pain, patient presented with DKA, area was lanced and packed, patient seen for evaluation of antibiotics  She is emotional, depressed, lost her son her daughter and her  to the short period of time.                 Interval changes  9/24/2021   Patient Vitals for the past 8 hrs:   BP Temp Temp src Pulse Resp SpO2 09/24/21 0800 123/71 98.5 °F (36.9 °C) Oral 86 20 --   09/24/21 0751 (!) 148/82 98.3 °F (36.8 °C) Oral 81 19 97 %       9/22 - cx from the hand now  Normal randi and C glabrata  DC in  the plans    9/23 - Patient knows possibility of amputation. Wants to be discharged. Complains of pain in hand and radiating to forearm. 9/24: Hand surgery lanced the finger swollen area and remove 3 cc of pus, broken all the loculations present in there, culture sent showing so far gram-positive cocci in pairs  Pain seems to be better, patient planned to be discharged  I asked the lab to call me with final results of those cultures        Summary of relevant labs:  Labs:  WBC 6.2  Creat normal    Micro:  BC neg  Wound cx GPR normal randi  OR cx 9/14  canddia glabrata  I/D cx 9/23 GPC pairs  Imaging:  R hand xray 9/14/21  Marked soft tissue swelling involving the 2nd finger and dorsum of the hand. No acute osseous abnormality evident       I have personally reviewed the past medical history, past surgical history, medications, social history, and family history, and I haveupdated the database accordingly. Allergies:   Bactrim [sulfamethoxazole-trimethoprim] and Adhesive tape     Review of Systems:     Review of Systems   Constitutional: Negative for activity change, fatigue and fever. HENT: Negative for congestion and sore throat. Eyes: Negative for photophobia, pain, discharge, redness, itching and visual disturbance. Respiratory: Negative for apnea, cough, chest tightness and shortness of breath. Cardiovascular: Negative for chest pain and leg swelling. Gastrointestinal: Negative for abdominal distention. Endocrine: Negative for polyphagia and polyuria. Genitourinary: Negative for dysuria, flank pain, frequency and urgency. Musculoskeletal: Positive for arthralgias. Skin: Positive for color change and wound. Allergic/Immunologic: Negative for immunocompromised state.    Neurological: Negative for dizziness, tremors, seizures, syncope, light-headedness and numbness. Hematological: Negative for adenopathy. Psychiatric/Behavioral: Negative for agitation. Physical Examination :       Physical Exam  Constitutional:       General: She is not in acute distress. Appearance: Normal appearance. She is not ill-appearing or diaphoretic. HENT:      Head: Normocephalic and atraumatic. Nose: Nose normal.      Mouth/Throat:      Mouth: Mucous membranes are moist.   Eyes:      General: No scleral icterus. Conjunctiva/sclera: Conjunctivae normal.      Pupils: Pupils are equal, round, and reactive to light. Cardiovascular:      Rate and Rhythm: Normal rate and regular rhythm. Heart sounds: Normal heart sounds. No murmur heard. Pulmonary:      Effort: No respiratory distress. Breath sounds: Normal breath sounds. No rhonchi. Abdominal:      General: There is no distension. Palpations: Abdomen is soft. Tenderness: There is no abdominal tenderness. Genitourinary:     Comments: No cullen  Musculoskeletal:         General: Deformity present. No swelling, tenderness or signs of injury. Cervical back: Neck supple. No rigidity or tenderness. Right lower leg: No edema. Left lower leg: No edema. Skin:     General: Skin is dry. Coloration: Skin is not jaundiced. Findings: Erythema and lesion present. Neurological:      Mental Status: She is alert and oriented to person, place, and time. Cranial Nerves: No cranial nerve deficit. Psychiatric:         Mood and Affect: Mood normal.         Thought Content:  Thought content normal.         Past Medical History:     Past Medical History:   Diagnosis Date    Acid reflux 5/29/2017    Acute cystitis with hematuria 10/11/2016    Acute non-recurrent maxillary sinusitis 11/28/2017    Asthma     Bipolar 1 disorder ([facility] 1/4/2018    Bipolar disorder, mixed ([address]     BMI 34.0-34.9,adult 11/28/2017    Cannabis use disorder, severe, dependence ([address] 12/19/2017    Cerebrovascular accident (CVA) ([address] 6/14/2017    Chest pain 11/5/2016    Chronic renal insufficiency     Cocaine abuse ([address] 1/5/2018    COVID-19 virus RNA test result unknown     DDD (degenerative disc disease), cervical     Diabetes mellitus ([address]     Dizziness 6/13/2017    Fibromyalgia     History of stroke 9/9/2017    Homicidal ideation 11/6/2017    Hyperglycemia     Hypertension     Hypotension 1/18/2019    IDDM (insulin dependent diabetes mellitus) 12/21/2015    Lupus ([address]     Migraine     Neuropathy     Neuropathy     Polysubstance abuse ([address] 9/17/2017    Post traumatic stress disorder (PTSD)     Posttraumatic stress disorder 5/29/2017    Recurrent depression ([address] 5/29/2017    Recurrent major depressive disorder, in partial remission ([address] 11/28/2017    Screening mammogram, encounter for 11/28/2017    Severe recurrent major depression with psychotic features ([address] 12/19/2017    Severe recurrent major depression without psychotic features ([address] 12/19/2017    Stroke (cerebrum) ([address] 6/14/2017    Stroke ([address]     per chart notes    Suicidal ideation     Suicidal intent 3/10/2017    Vitamin D deficiency 11/28/2017    White matter changes 6/13/2017       Past Surgical  History:     Past Surgical History:   Procedure Laterality Date    ABDOMEN SURGERY      drain tube    ABSCESS DRAINAGE      right buttock    CATARACT REMOVAL WITH IMPLANT Bilateral     CHEST TUBE INSERTION      FINGER AMPUTATION Left 03/13/2021    LEFT RING FINER AMPUTATION performed by Scott Tolentino MD at [address] [address] Right 09/15/2021     I&D INDEX FINGER     HAND SURGERY Right 09/14/2021    I&D INDEX FINGER performed by Scott Tolentino MD at [address] [place] [address] Right 9/15/2021    I&D INDEX FINGER performed by Scott Tolentino MD at [facility] Bilateral        Medications:      polyethylene glycol  17 g Oral Once    pregabalin  100 mg Oral BID    baclofen  10 mg Oral TID    fluconazole  400 mg Oral Daily    insulin glargine  20 Units SubCUTAneous BID    enoxaparin  40 mg SubCUTAneous Daily    amoxicillin  500 mg Oral 3 times per day    insulin lispro  0-12 Units SubCUTAneous TID WC    insulin lispro  0-6 Units SubCUTAneous Nightly    sodium chloride  1,000 mL IntraVENous Once    budesonide-formoterol  2 puff Inhalation BID    pantoprazole  20 mg Oral BID AC    traZODone  150 mg Oral Nightly    venlafaxine  150 mg Oral Daily with breakfast    sodium chloride flush  5-40 mL IntraVENous 2 times per day       Social History:     Social History     Socioeconomic History    Marital status: Legally      Spouse name: Not on file    Number of children: Not on file    Years of education: Not on file    Highest education level: Not on file   Occupational History    Not on file   Tobacco Use    Smoking status: Never Smoker    Smokeless tobacco: Never Used   Vaping Use    Vaping Use: Never used   Substance and Sexual Activity    Alcohol use: Not Currently    Drug use: Yes     Types: Marijuana, Cocaine     Comment: + Cocaine 2/2021 also, see Care Everywhere UDS    Sexual activity: Not Currently     Partners: Male   Other Topics Concern    Not on file   Social History Narrative    Not on file     Social Determinants of Health     Financial Resource Strain: High Risk    Difficulty of Paying Living Expenses: Hard   Food Insecurity: Food Insecurity Present    Worried About Running Out of Food in the Last Year: Often true    Kelin of Food in the Last Year: Often true   Transportation Needs: Unmet Transportation Needs    Lack of Transportation (Medical):  Yes    Lack of Transportation (Non-Medical): Yes   Physical Activity: Inactive    Days of Exercise per Week: 0 days    Minutes of Exercise per Session: 0 min   Stress: Stress Concern Present    Feeling of Stress : Rather much   Social Connections:     Frequency of Communication with Friends and Family:     Frequency of Social Gatherings with Friends and Family:     Attends Congregation Services:     Active Member of Clubs or Organizations:     Attends Club or Organization Meetings:     Marital Status:    Intimate Partner Violence:     Fear of Current or Ex-Partner:     Emotionally Abused:     Physically Abused:     Sexually Abused:        Family History:     Family History   Problem Relation Age of Onset    Diabetes Mother     Stroke Mother     Diabetes Father     Diabetes Sister     Diabetes Brother     Other Son         GSW    No Known Problems Sister       Medical Decision Making:   I have independently reviewed/ordered the following labs:    CBC with Differential:   Recent Labs     09/23/21  0529 09/24/21  0552   WBC 6.2 5.7   HGB 9.0* 8.5*   HCT 32.1* 28.8*   * 493*   LYMPHOPCT 43 40   MONOPCT 13* 11     BMP:  Recent Labs     09/23/21  0529 09/24/21  0552    139   K 4.3 4.5    105   CO2 24 24   BUN 10 12   CREATININE 0.79 0.67     Hepatic Function Panel:   Recent Labs     09/22/21  0630   PROT 7.5   LABALBU 2.9*   BILIDIR <0.08   IBILI CANNOT BE CALCULATED   BILITOT 0.16*   ALKPHOS 100   ALT 6   AST 21     No results for input(s): RPR in the last 72 hours. No results for input(s): HIV in the last 72 hours. No results for input(s): BC in the last 72 hours. Lab Results   Component Value Date    CREATININE 0.67 09/24/2021    GLUCOSE 178 09/24/2021       Detailed results: Thank you for allowing us to participate in the care of this patient. Please call with questions. This note is created with the assistance of a speech recognition program.  While intending to generate adocument that actually reflects the content of the visit, the document can still have some errors including those of syntax and sound a like substitutions which may escape proof reading.   It such instances, actual meaningcan be extrapolated by contextual diversion.     Domingo Lin MD  Office: [phone]  Perfect serve / office [phone]

## 2021-09-24 NOTE — CARE COORDINATION
[facility] Flow/Interdisciplinary Rounds Progress Note    Quality Flow Rounds held on 2021 at [address] [facility] Attending:  Bedside Nurse, ,  and Nursing Unit Leadership    Barriers to Discharge: none    Anticipated Discharge Date:  Expected Discharge Date: 21    Anticipated Discharge Disposition:    Readmission Risk              Risk of Unplanned Readmission:  33           Discussed patient goal for the day, patient clinical progression, and barriers to discharge. The following Goal(s) of the Day/Commitment(s) have been identified:    Spoke with pt and agreeable to go to [facility]. Just wants to wait until her daughter can help get her settled there. Medical necessity faxed to [facility] and it is OK for a 5:00pm transport. Called Dory at [facility] and she states 5;00pm OK as well. Bedside RN aware of D/C time    1200 Spoke with pt and she said 5:00pm too early with daughters work schedule and she is coming from [facility]. Called [facility] and changed to 6:30pm transport. Margarita Baron at [facility] aware. Pt will let daughter know. 67 PS MD to please update MINDA. Asked Bee Holman RN to update as well. Called Dory at [place] and told her I faxed current MINDA, HENS, face sheet and H&P to [phone]. And will fax updated one when completed. Zoie faxed to [facility] at [phone]    Discharge [facility] Case Management Department  Written by: Tejas Parks RN    Patient Name: Callum Acevedo  Attending Provider: Aubrey Powers MD  Admit Date: 2021  5:22 AM  MRN: [id number]  Account: [de-identified]                     : 1965  Discharge Date:       Disposition: SNF-[facility] per ambulette.     VINOD Marin RN  2021

## 2021-09-26 NOTE — DISCHARGE SUMMARY
[address]     Department of Internal Medicine - Staff Internal Medicine Teaching Service    INPATIENT DISCHARGE SUMMARY      Patient Identification:  Laly Koch is a 49 y.o. female. :  1965  MRN: [id number]     Acct: [de-identified]   PCP: Aristeo Chow MD  Admit Date:  2021  Discharge date and time: 2021  6:52 PM   Attending Provider: Dr. Breanna Quigley Problem Lists:  Principal Problem:    DKA (diabetic ketoacidoses) ([address]  Active Problems:    Tenosynovitis of finger    Bipolar disorder, mixed ([address]    Polysubstance abuse ([address]  Resolved Problems:    * No resolved hospital problems. *      HOSPITAL STAY     Brief Inpatient course:   Laly Koch is a 49 y.o. female who was admitted for the management of DKA (diabetic ketoacidoses) ([address] presented to the emergency department with a chief complaint of right index finger pain. The patient has PMH significant for DM-2, HTN, Bipolar disorder, and polysubstance abuse. The patient reported that she had a fall yesterday and injured her right index finger and since then has had swelling and pain. She denies hitting her head or loss of consciousness. She denies urine or fecal incontinence. She denies neck pain, chest pain, shortness of breath, abdominal pain. The patient was tearful during the interview and was unable to provide a detailed history. She denies intention of self-harm or suicidal ideation. Plastic surgery was consulted and patient was taken to OR for exploration and drainage of right index finger abscess. Patient was treated for tenosynovitis of the right index finger and received I&D of abscess by plastic surgery team on  and 09/15. ID was consulted and patient was initially on that was switched to insulin 21 days starting 2021.  Wound Abscess culture showed many gram-positive rods and yeast.  Plastic surgery discussed the option for amputation versus salvaging the finger that will have limited motion. The patient chose to have dressing changes along with antibiotics without amputation. Plastic surgery recommended twice daily dressing changes with p.o. antibiotics and follow-up outpatient at [facility] hand clinic. The patient was discharged on amoxicillin and fluconazole with follow-up LFTs twice per week, might need to extend antibiotics total of 6 weeks due to concern for underlying osteomyelitis and long persistence of pus. Recommended infectious disease follow-up in the office in 3 weeks. The patient also had DKA at the time of presentation and was initially treated with insulin drip that was bridged to Lantus 20 units twice daily with medium dose sliding scale. Patient's HbA1c during this admission was 14.2 secondary to noncompliance with insulin at home. Patient urine tox positive for benzodiazepine cocaine and opiates at the time of presentation, psychiatry was consulted due to erratic behavior during inpatient course desire to leave AMA multiple times along with refusing lab draws. Stephen recommended low risk of suicide and harm to others outpatient follow-up. Patient also had MARTIN on presentation that resolved.     Procedures/ Significant Interventions:    I&D- Right index finger on 09/14 and 09/15 by Plastic Surgery  Bedside I&D- Right index finger on 09/23    Consults:     Consults:     Final Specialist Recommendations/Findings:   IP CONSULT TO PLASTIC SURGERY  IP CONSULT TO INTERNAL MEDICINE  IP CONSULT TO CASE MANAGEMENT  IP CONSULT TO SOCIAL WORK  IP CONSULT TO INFECTIOUS DISEASES  IP CONSULT TO PSYCHIATRY  IP CONSULT TO IV TEAM  IP CONSULT TO DIABETES EDUCATOR  IP CONSULT TO HOME CARE NEEDS  IP CONSULT TO IV TEAM      Any Hospital Acquired Infections: none    Discharge Functional Status:  stable    DISCHARGE PLAN     Disposition: SNF    Patient Instructions:   Discharge Medication List as of Normal      albuterol sulfate  (90 Base) MCG/ACT inhaler Inhale 2 puffs into the lungs every 4 hours as needed for Wheezing, Disp-1 Inhaler, R-5Normal      FLOVENT  MCG/ACT inhaler Inhale 2 puffs into the lungs 2 times daily, Disp-1 Inhaler,R-3,DAWNormal      budesonide-formoterol (SYMBICORT) 80-4.5 MCG/ACT AERO Inhale 2 puffs into the lungs 2 times daily, Disp-1 Inhaler,R-5Normal         STOP taking these medications       voriconazole (VFEND) 200 MG tablet Comments:   Reason for Stopping:         Misc. Devices MISC Comments:   Reason for Stopping:         Alcohol Swabs (ALCOHOL PREP) 70 % PADS Comments:   Reason for Stopping:               Activity: activity as tolerated    Diet: diabetic diet    Follow-up:    Shiraz Jeffrey MD  [address]  [address]  [phone]    Schedule an appointment as soon as possible for a visit   For follow up    [facility]  [address] [address]  [phone]  Go to  As needed    Basim Crocker MD  [address] [facility] [address] [place]  [phone]    In 1 week  follow up on finger cellulitis    Abbey Ham MD  [address] [facility]  [phone]    In 2 weeks  history of depression and PTSD. [address] [place] [id number] [phone]  Call  As needed, Follow up education on diabetes self management    Juan Tsang MD  [address] [place] [address], [address] [address] [place]    Schedule an appointment as soon as possible for a visit in 3 weeks  infec diseases - not virtual      Patient Instructions:     Please continue to take diflucan and amoxicillin as advised and follow up with Dr. Stone Ernst outpatient for follow up. Recommend compliance with insulin for better blood glucose control and prevention of infections in the future. Follow up with PCP for post hospitalization follow up.       Cellulitis: Care Instructions  Your Care Instructions     Cellulitis is a skin infection caused by bacteria, most often strep or staph. It often occurs after a break in the skin from a scrape, cut, bite, or puncture, or after a rash. Cellulitis may be treated without doing tests to find out what caused it. But your doctor may do tests, if needed, to look for a specific bacteria, like methicillin-resistant Staphylococcus aureus (MRSA). The doctor has checked you carefully, but problems can develop later. If you notice any problems or new symptoms, get medical treatment right away. Follow-up care is a key part of your treatment and safety. Be sure to make and go to all appointments, and call your doctor if you are having problems. It's also a good idea to know your test results and keep a list of the medicines you take. How can you care for yourself at home? · Take your antibiotics as directed. Do not stop taking them just because you feel better. You need to take the full course of antibiotics. · Prop up the infected area on pillows to reduce pain and swelling. Try to keep the area above the level of your heart as often as you can. · If your doctor told you how to care for your wound, follow your doctor's instructions. If you did not get instructions, follow this general advice:  ? Wash the wound with clean water 2 times a day. Don't use hydrogen peroxide or alcohol, which can slow healing. ? You may cover the wound with a thin layer of petroleum jelly, such as Vaseline, and a nonstick bandage. ? Apply more petroleum jelly and replace the bandage as needed. · Be safe with medicines. Take pain medicines exactly as directed. ? If the doctor gave you a prescription medicine for pain, take it as prescribed. ? If you are not taking a prescription pain medicine, ask your doctor if you can take an over-the-counter medicine. To prevent cellulitis in the future  · Try to prevent cuts, scrapes, or other injuries to your skin.  Cellulitis most often occurs where there is a break in the skin. · If you get a scrape, cut, mild burn, or bite, wash the wound with clean water as soon as you can to help avoid infection. Don't use hydrogen peroxide or alcohol, which can slow healing. · If you have swelling in your legs (edema), support stockings and good skin care may help prevent leg sores and cellulitis. · Take care of your feet, especially if you have diabetes or other conditions that increase the risk of infection. Wear shoes and socks. Do not go barefoot. If you have athlete's foot or other skin problems on your feet, talk to your doctor about how to treat them. When should you call for help? Call your doctor now or seek immediate medical care if:    · You have signs that your infection is getting worse, such as:  ? Increased pain, swelling, warmth, or redness. ? Red streaks leading from the area. ? Pus draining from the area. ? A fever.     · You get a rash. Watch closely for changes in your health, and be sure to contact your doctor if:    · You do not get better as expected. Where can you learn more? Go to https://[employer]. org and sign in to your [employer] account. Enter T253 in the [facility] box to learn more about \"Cellulitis: Care Instructions. \"     If you do not have an account, please click on the \"Sign Up Now\" link. Current as of: March 3, 2021               Content Version: 12.9  © 2006-2021 Healthwise, [facility]. Care instructions adapted under license by [facility] ([facility]). If you have questions about a medical condition or this instruction, always ask your healthcare professional. [place] [address] any warranty or liability for your use of this information. amoxicillin  Pronunciation:  am OX i arabella in  What is the most important information I should know about amoxicillin? You should not use this medicine if you are allergic to any penicillin antibiotic.   What is amoxicillin? Amoxicillin is a penicillin antibiotic that is used to treat many different types of infection caused by bacteria, such as tonsillitis, bronchitis, pneumonia, and infections of the ear, nose, throat, skin, or urinary tract. Amoxicillin is also sometimes used together with another antibiotic called clarithromycin (Biaxin) to treat stomach ulcers caused by Helicobacter pylori infection. This combination is sometimes used with a stomach acid reducer called lansoprazole (Prevacid). Amoxicillin may also be used for purposes not listed in this medication guide. What should I discuss with my healthcare provider before taking amoxicillin? You should not use this medicine if you are allergic to any penicillin antibiotic, such as ampicillin, dicloxacillin, oxacillin, penicillin, or ticarcillin. Tell your doctor if you have ever had:  · kidney disease;  · mononucleosis (also called \"mono\");  · diarrhea caused by taking antibiotics; or  · food or drug allergies (especially to a cephalosporin antibiotic such as Omnicef, Cefzil, Ceftin, Keflex, and others). It is not known whether this medicine will harm an unborn baby. Tell your doctor if you are pregnant or plan to become pregnant. Amoxicillin can make birth control pills less effective. Ask your doctor about using a non-hormonal birth control (condom, diaphragm, cervical cap, or contraceptive sponge) to prevent pregnancy. It may not be safe to breastfeed while using this medicine. Ask your doctor about any risk. How should I take amoxicillin? Follow all directions on your prescription label and read all medication guides or instruction sheets. Use the medicine exactly as directed. Take this medicine at the same time each day. Some forms of amoxicillin may be taken with or without food. Check your medicine label to see if you should take your amoxicillin with food or not. Shake the oral suspension (liquid) before you measure a dose.   Measure liquid medicine with the dosing syringe provided, or use a medicine dose-measuring device (not a kitchen spoon). You may mix the liquid with water, milk, baby formula, fruit juice, or ginger ale. Drink all of the mixture right away. Do not save for later use. You must chew the chewable tablet before you swallow it. Swallow the regular tablet whole and do not crush, chew, or break it. You will need frequent medical tests. If you are taking amoxicillin with clarithromycin and/or lansoprazole to treat stomach ulcer, use all of your medications as directed. Read the medication guide or patient instructions provided with each medication. Do not change your doses or medication schedule without your doctor's advice. Use this medicine for the full prescribed length of time, even if your symptoms quickly improve. Skipping doses can increase your risk of infection that is resistant to medication. Amoxicillin will not treat a viral infection such as the flu or a common cold. Do not share this medicine with another person, even if they have the same symptoms you have. This medicine can affect the results of certain medical tests. Tell any doctor who treats you that you are using amoxicillin. Store at room temperature away from moisture, heat, and light. You may store liquid amoxicillin in a refrigerator but do not allow it to freeze. Throw away any liquid amoxicillin that is not used within 14 days after it was mixed at the pharmacy. What happens if I miss a dose? Skip the missed dose and use your next dose at the regular time. Do not use two doses at one time. What happens if I overdose? Seek emergency medical attention or call the Poison Help line at 1-275.746.6256. What should I avoid while taking amoxicillin? Antibiotic medicines can cause diarrhea, which may be a sign of a new infection. If you have diarrhea that is watery or bloody, call your doctor before using anti-diarrhea medicine.   What are the possible side effects of amoxicillin? Get emergency medical help if you have signs of an allergic reaction (hives, difficult breathing, swelling in your face or throat) or a severe skin reaction (fever, sore throat, burning eyes, skin pain, red or purple skin rash with blistering and peeling). Call your doctor at once if you have:  · severe stomach pain; or  · diarrhea that is watery or bloody (even if it occurs months after your last dose). Common side effects may include:  · nausea, vomiting, diarrhea; or  · rash. This is not a complete list of side effects and others may occur. Call your doctor for medical advice about side effects. You may report side effects to FDA at 5-297-FDA-7930. What other drugs will affect amoxicillin? Tell your doctor about all your other medicines, especially:  · any other antibiotics;  · allopurinol;  · probenecid; or  · a blood thinner --warfarin, Coumadin, Jantoven. This list is not complete. Other drugs may affect amoxicillin, including prescription and over-the-counter medicines, vitamins, and herbal products. Not all possible drug interactions are listed here. Where can I get more information? Your pharmacist can provide more information about amoxicillin. Remember, keep this and all other medicines out of the reach of children, never share your medicines with others, and use this medication only for the indication prescribed. Every effort has been made to ensure that the information provided by [address] is accurate, up-to-date, and complete, but no guarantee is made to that effect. Drug information contained herein may be time sensitive. [facility] information has been compiled for use by healthcare practitioners and consumers in the [place] and therefore [facility] does not warrant that uses outside of the [place] are appropriate, unless specifically indicated otherwise.  [facility]'s drug information does not endorse drugs, diagnose patients or recommend therapy. [facility]'s drug information is an informational resource designed to assist licensed healthcare practitioners in caring for their patients and/or to serve consumers viewing this service as a supplement to, and not a substitute for, the expertise, skill, knowledge and judgment of healthcare practitioners. The absence of a warning for a given drug or drug combination in no way should be construed to indicate that the drug or drug combination is safe, effective or appropriate for any given patient. [facility] does not assume any responsibility for any aspect of healthcare administered with the aid of information [facility] provides. The information contained herein is not intended to cover all possible uses, directions, precautions, warnings, drug interactions, allergic reactions, or adverse effects. If you have questions about the drugs you are taking, check with your doctor, nurse or pharmacist.  Copyright 5715-6020 [address]. Version: 10.01. Revision date: 2019. Care instructions adapted under license by [facility] ([facility]). If you have questions about a medical condition or this instruction, always ask your healthcare professional. [place] [address] any warranty or liability for your use of this information. oxycodone  Pronunciation:  ox i KOE done  Brand:  Oxaydo, OxyCONTIN, Oxyfast, OxyIR, Roxicodone, Xtampza ER  What is the most important information I should know about oxycodone? MISUSE OF OPIOID MEDICINE CAN CAUSE ADDICTION, OVERDOSE, OR DEATH. Keep the medication in a place where others cannot get to it. Taking opioid medicine during pregnancy may cause life-threatening withdrawal symptoms in the . Fatal side effects can occur if you use opioid medicine with alcohol, or with other drugs that cause drowsiness or slow your breathing. What is oxycodone? Oxycodone is an opioid pain medication used to treat moderate to severe pain.   The extended-release form of oxycodone is for around-the-clock treatment of pain and should not  be used on an as-needed basis for pain. Oxycodone may also be used for purposes not listed in this medication guide. What should I discuss with my healthcare provider before using oxycodone? You should not use oxycodone if you are allergic to it, or if you have:  · severe asthma or breathing problems; or  · a blockage in your stomach or intestines. You should not use oxycodone unless you are already using a similar opioid medicine and are tolerant to it. Most brands of oxycodone are not approved for use in people under 18. OxyContin should not be given to a child younger than 6years old. Tell your doctor if you have ever had:  · breathing problems, sleep apnea;  · a head injury, or seizures;  · drug or alcohol addiction, or mental illness;  · liver or kidney disease;  · urination problems; or  · problems with your gallbladder, pancreas, or thyroid. If you use opioid medicine while you are pregnant, your baby could become dependent on the drug. This can cause life-threatening withdrawal symptoms in the baby after it is born. Babies born dependent on opioids may need medical treatment for several weeks. Ask a doctor before using opioid medicine if you are breastfeeding. Tell your doctor if you notice severe drowsiness or slow breathing in the nursing baby. How should I use oxycodone? Follow the directions on your prescription label and read all medication guides. Never use oxycodone in larger amounts, or for longer than prescribed. Tell your doctor if you feel an increased urge to take more of this medicine. Never share opioid medicine with another person, especially someone with a history of drug abuse or addiction. MISUSE CAN CAUSE ADDICTION, OVERDOSE, OR DEATH. Keep the medication in a place where others cannot get to it. Selling or giving away opioid medicine is against the law.   Stop taking all other around-the-clock opioid pain caring for you can give the naloxone if you stop breathing or don't wake up. Your caregiver must still get emergency medical help and may need to perform CPR (cardiopulmonary resuscitation) on you while waiting for help to arrive. Anyone can buy naloxone from a pharmacy or local health department. Make sure any person caring for you knows where you keep naloxone and how to use it. What should I avoid while using oxycodone? Do not drink alcohol. Dangerous side effects or death could occur. Avoid driving or operating machinery until you know how oxycodone will affect you. Dizziness or severe drowsiness can cause falls or other accidents. Avoid medication errors. Always check the brand and strength of oxycodone you get from the pharmacy. What are the possible side effects of oxycodone? Get emergency medical help if you have signs of an allergic reaction: hives; difficult breathing; swelling of your face, lips, tongue, or throat. Opioid medicine can slow or stop your breathing, and death may occur. A person caring for you should give naloxone and/or seek emergency medical attention if you have slow breathing with long pauses, blue colored lips, or if you are hard to wake up. Call your doctor at once if you have:  · noisy breathing, sighing, shallow breathing, breathing that stops during sleep;  · a slow heart rate or weak pulse;  · a light-headed feeling, like you might pass out;  · confusion, unusual thoughts or behavior;  · seizure (convulsions);  · low cortisol levels -- nausea, vomiting, loss of appetite, dizziness, worsening tiredness or weakness; or  · high levels of serotonin in the body --agitation, hallucinations, fever, sweating, shivering, fast heart rate, muscle stiffness, twitching, loss of coordination, nausea, vomiting, diarrhea. Serious breathing problems may be more likely in older adults and in those who are debilitated or have wasting syndrome or chronic breathing disorders.    Common side effects may include:  · drowsiness, headache, dizziness, tiredness; or  · constipation, stomach pain, nausea, vomiting. This is not a complete list of side effects and others may occur. Call your doctor for medical advice about side effects. You may report side effects to FDA at 8-246-FDA-9444. What other drugs will affect oxycodone? You may have breathing problems or withdrawal symptoms if you start or stop taking certain other medicines. Tell your doctor if you also use an antibiotic, antifungal medication, heart or blood pressure medication, seizure medication, or medicine to treat HIV or hepatitis C. Opioid medication can interact with many other drugs and cause dangerous side effects or death. Be sure your doctor knows if you also use:  · cold or allergy medicines, bronchodilator asthma/COPD medication, or a diuretic (\"water pill\");  · medicines for motion sickness, irritable bowel syndrome, or overactive bladder;  · other opioids --opioid pain medicine or prescription cough medicine;  · a sedative like Valium --diazepam, alprazolam, lorazepam, Xanax, Klonopin, Versed, and others;  · drugs that make you sleepy or slow your breathing --a sleeping pill, muscle relaxer, medicine to treat mood disorders or mental illness; or  · drugs that affect serotonin levels in your body --a stimulant, or medicine for depression, Parkinson's disease, migraine headaches, serious infections, or nausea and vomiting. This list is not complete and many other drugs may affect oxycodone. This includes prescription and over-the-counter medicines, vitamins, and herbal products. Not all possible drug interactions are listed here. Where can I get more information? Your pharmacist can provide more information about oxycodone. Remember, keep this and all other medicines out of the reach of children, never share your medicines with others, and use this medication only for the indication prescribed.    Every effort has been made to ensure that the information provided by [address] is accurate, up-to-date, and complete, but no guarantee is made to that effect. Drug information contained herein may be time sensitive. [facility] information has been compiled for use by healthcare practitioners and consumers in the [facility] and therefore [facility] does not warrant that uses outside of the [facility] [place] are appropriate, unless specifically indicated otherwise. [facility]'s drug information does not endorse drugs, diagnose patients or recommend therapy. [facility]'s drug information is an informational resource designed to assist licensed healthcare practitioners in caring for their patients and/or to serve consumers viewing this service as a supplement to, and not a substitute for, the expertise, skill, knowledge and judgment of healthcare practitioners. The absence of a warning for a given drug or drug combination in no way should be construed to indicate that the drug or drug combination is safe, effective or appropriate for any given patient. [facility] does not assume any responsibility for any aspect of healthcare administered with the aid of information [facility] provides. The information contained herein is not intended to cover all possible uses, directions, precautions, warnings, drug interactions, allergic reactions, or adverse effects. If you have questions about the drugs you are taking, check with your doctor, nurse or pharmacist.  Copyright 3631-1128 [address]: 14.02. Revision date: 1/28/2021. Care instructions adapted under license by [facility] ([facility]). If you have questions about a medical condition or this instruction, always ask your healthcare professional. [place] [address] any warranty or liability for your use of this information.            voriconazole (oral/injection)  Pronunciation:  vor i ELINA a zole  Brand:  VFEND  What is the most important information I should know about voriconazole? Tell your doctor about all your current medicines and any you start or stop using. Many drugs can interact, and some drugs should not be used together. What is voriconazole? Voriconazole is an antifungal medicine that is used to treat infections caused by yeast or other types of fungus. Voriconazole may also be used for purposes not listed in this medication guide. What should I discuss with my healthcare provider before using voriconazole? You should not use voriconazole if you are allergic to it. Many drugs can interact and cause dangerous effects. Some drugs should not be used together with voriconazole. Your doctor may change your treatment plan if you also use:  · carbamazepine;  · cisapride;  · efavirenz;  · pimozide;  · quinidine;  · rifabutin, rifampin;  · ritonavir;  · sirolimus;  · Lisa's wort;  · certain barbiturates (mephobarbital, phenobarbital); or  · \"ergot\" migraine headache medicines (dihydroergotamine, ergotamine, ergonovine, methylergonovine). Tell your doctor if you have ever had:  · heart disease, or a heart rhythm disorder;  · high or low levels of calcium, potassium, or magnesium in your blood;  · liver or kidney disease; or  · trouble digesting sugar or dairy products (voriconazole tablets contain lactose, voriconazole liquid contains sucrose). Voriconazole may harm an unborn baby. Use effective birth control to prevent pregnancy, and tell your doctor if you become pregnant. Voriconazole can interact with certain birth control pills, which may increase side effects. Ask your doctor about the best form of birth control to use during treatment with voriconazole. It may not be safe to breastfeed while using this medicine. Ask your doctor about any risk. Voriconazole is not approved for use by anyone younger than 3years old. How should I use voriconazole? Follow all directions on your prescription label and read all medication guides or instruction sheets.  Use the medicine exactly as directed. Take oral  voriconazole (tablets or liquid) at least 1 hour before or 1 hour after eating a meal.  Shake the liquid before you measure a dose. Use the dosing syringe provided, or use a medicine dose-measuring device (not a kitchen spoon). Do not mix voriconazole liquid with any other medicine or liquid. Voriconazole injection is given as a slow infusion into a vein, over 1 to 2 hours. Voriconazole is usually given by injection only if you are unable to take the medicine by mouth. A healthcare provider will give your first dose and may teach you how to properly use the medication by yourself. Voriconazole injection is a powder medicine that must be mixed with a liquid (diluent) before using it. When using injections by yourself, be sure you understand how to properly mix and store the medicine. Prepare your injection only when you are ready to give it. Do not use if the medicine has changed colors or has particles in it. Call your pharmacist for new medicine. If you cannot use the mixed injection right away, store it in the refrigerator and use it within 24 hours. Do not freeze. Use a needle and syringe only once and then place them in a puncture-proof \"sharps\" container. Follow state or local laws about how to dispose of this container. Keep it out of the reach of children and pets. Voriconazole is sometimes given for up to several days after lab tests show that the infection has cleared. Very severe infections may need to be treated for several weeks. Use this medicine for the full prescribed length of time, even if your symptoms quickly improve. Skipping doses can increase your risk of infection that is resistant to medication. Voriconazole will not treat a viral infection such as the flu or a common cold. You may need frequent blood tests. Your vision and kidney or liver function may also need to be checked.   Store voriconazole tablets or liquid at room temperature away from moisture and heat. Do not store in a refrigerator or freezer. Keep the medicine bottle tightly closed when not in use. Throw away any unused liquid after 14 days. What happens if I miss a dose? Take the medicine as soon as you can, but skip the missed dose if it is almost time for your next dose. Do not take two doses at one time. Call your doctor for instructions if you miss a dose of injectable voriconazole. What happens if I overdose? Seek emergency medical attention or call the Poison Help line at 1-258.670.2874. What should I avoid while using voriconazole? Voriconazole may cause vision changes such as blurred vision and sensitivity to light. Wear sunglasses during the day to protect your eyes from bright light. Be careful if you drive or do anything that requires you to be alert and able to see clearly. Avoid exposure to sunlight or tanning beds. Voriconazole can make you more sensitive to sunlight or cause a serious skin reaction, including lesions that may lead to skin cancer. Wear protective clothing and use sunscreen (SPF 30 or higher) when you are outdoors. What are the possible side effects of voriconazole? Get emergency medical help if you have signs of an allergic reaction (hives, difficult breathing, swelling in your face or throat) or a severe skin reaction (fever, sore throat, burning eyes, skin pain, red or purple skin rash with blistering and peeling). Seek medical treatment if you have a serious drug reaction that can affect many parts of your body. Symptoms may include: skin rash, fever, swollen glands, muscle aches, severe weakness, unusual bruising, or yellowing of your skin or eyes. Some side effects may occur during the injection. Tell your caregiver right away if you feel dizzy, nauseated, light-headed, itchy, sweaty, or have chest tightness or trouble breathing.   Call your doctor at once if you have:  · fast or pounding heartbeats, fluttering in your chest, shortness of breath, and sudden dizziness (like you might pass out);  · a sunburn;  · vision problems, changes in your color vision;  · easy bruising, unusual bleeding, purple or red spots under your skin;  · slow heart rate, weak pulse, fainting, slow breathing;  · kidney problems --little or no urination, swelling in your feet or ankles, feeling tired or short of breath;  · liver problems --nausea, vomiting, flu-like symptoms, itching, tiredness, or jaundice (yellowing of the skin or eyes); or  · signs of an electrolyte imbalance --dizziness, numbness or tingling, constipation, increased thirst or urination, irregular heartbeats, cough or choking feeling, feeling jittery, leg cramps, muscle spasms, muscle weakness or limp feeling. Common side effects may include:  · fever, chills, stuffy nose, sneezing, sinus pain, cough, sore throat, trouble breathing;  · bruising or bleeding, nosebleeds, coughing up blood;  · high or low blood pressure, fast heart rate;  · electrolyte imbalance;  · stomach pain, bloating, nausea, vomiting, diarrhea, constipation;  · kidney problems;  · swelling in your hands or feet;  · headache, dizziness, vision changes, hallucinations;  · rash; or  · abnormal liver function tests. This is not a complete list of side effects and others may occur. Call your doctor for medical advice about side effects. You may report side effects to FDA at 7-915-FDA-5675. What other drugs will affect voriconazole? Many drugs can affect voriconazole, and some drugs should not be used at the same time. Tell your doctor about all your current medicines and any medicine you start or stop using. This includes prescription and over-the-counter medicines, vitamins, and herbal products. Not all possible interactions are listed here. Where can I get more information? Your pharmacist can provide more information about voriconazole.   Remember, keep this and all other medicines out of the reach of children, never share your medicines with others, and use this medication only for the indication prescribed. Every effort has been made to ensure that the information provided by [facility] is accurate, up-to-date, and complete, but no guarantee is made to that effect. Drug information contained herein may be time sensitive. [facility] information has been compiled for use by healthcare practitioners and consumers in the [facility] and therefore [facility] does not warrant that uses outside of the [facility] are appropriate, unless specifically indicated otherwise. [facility]'s drug information does not endorse drugs, diagnose patients or recommend therapy. [facility]'s drug information is an informational resource designed to assist licensed healthcare practitioners in caring for their patients and/or to serve consumers viewing this service as a supplement to, and not a substitute for, the expertise, skill, knowledge and judgment of healthcare practitioners. The absence of a warning for a given drug or drug combination in no way should be construed to indicate that the drug or drug combination is safe, effective or appropriate for any given patient. [facility] does not assume any responsibility for any aspect of healthcare administered with the aid of information [facility] provides. The information contained herein is not intended to cover all possible uses, directions, precautions, warnings, drug interactions, allergic reactions, or adverse effects. If you have questions about the drugs you are taking, check with your doctor, nurse or pharmacist.  Copyright 6815-9300 [address]: 12.01. Revision date: 9/24/2020. Care instructions adapted under license by [facility] ([facility]). If you have questions about a medical condition or this instruction, always ask your healthcare professional. [place] [address] any warranty or liability for your use of this information.       Follow up labs: N/A  Follow up imaging:

## 2021-09-27 NOTE — PROGRESS NOTES
Physician Progress Note      PATIENT:               Kay Duff  CSN #:                  [id number]  :                       1965  ADMIT DATE:       2021 5:22 AM  DISCH DATE:        2021 6:52 PM  RESPONDING  PROVIDER #:        Ryan Middleton MD          QUERY TEXT:    Patient admitted with Cellulitis of right finger. Per Op notes dated 9/15 &    there is documentation of debridement. To accurately reflect the   procedure performed please document if debridement was excisional or   nonexcisional, the tool used and the deepest depth of tissue removed as down   to and including: The medical record reflects the following:  Risk Factors: cellulitis, IVD user  Clinical Indicators: Per OP note 9/15 ' a lot of necrotic fat which was   sharply debrided away. The tendon sheath looked pink and the tendon itself,   exposed very distally, was intact. The neurovascular bundle on the radial   side distal to the PIP joint was somewhat grayish, of questionable viability,  it was not debrided away. Per  OP note '  Examining the wound, the tendon   was now clearly necrotic, looked questionable yesterday. So, the flexor   tendon from the PIP distal was debrided away. The profundus and the sheath,   other areas of necrotic fat were further debrided away until only viable   appearing tissue remained. Treatment: I&D with debridement x2    Tools ( scalpel, scissors, pulse lavage, or curette )  Options provided:  -- Excisional debridement of Subcutaneous tissue & fascia on 9/15 and   Excisional debridement of Muscle & tendon on , please provide tool used. -- Excisional debridement of Subcutaneous tissue & fascia, please add tool   used . -- Nonexcisional debridement of Subcutaneous tissue &  fascia, please add tool   used . -- Excisional debridement of muscle, please add tool used . -- Nonexcisional debridement of muscle, please add tool used .   -- Other - I will add my own diagnosis  -- Disagree - Not applicable / Not valid  -- Disagree - Clinically unable to determine / Unknown  -- Refer to Clinical Documentation Reviewer    PROVIDER RESPONSE TEXT:    Excisional debridement of Subcutaneous tissue & fascia on 9/15 and Excisional   debridement of Muscle & tendon on 9/17. scissor    Query created by: Allegra Delgado on 9/22/2021 2:22 PM      Electronically signed by:  Melinda Caal MD 9/27/2021 9:04 AM

## 2021-09-28 LAB
CULTURE: ABNORMAL
DIRECT EXAM: ABNORMAL
DIRECT EXAM: ABNORMAL
Lab: ABNORMAL
SPECIMEN DESCRIPTION: ABNORMAL

## 2021-09-30 ENCOUNTER — CARE COORDINATION (OUTPATIENT)
Dept: CARE COORDINATION | Age: 56
End: 2021-09-30

## 2021-09-30 NOTE — CARE COORDINATION
Initial Contact Social Work Note - Ambulatory  9/30/2021        Identified Needs:  [address] delivered groceries  .     Social Work Plan:   [facility]. attempted to follow up on patient who had been hospitalized for quite a bit          Next Steps:  HC left a message for patient and will attempt to reach out to her on 10/07/21      Goals Addressed    None

## 2021-10-04 ENCOUNTER — CARE COORDINATION (OUTPATIENT)
Dept: CARE COORDINATION | Age: 56
End: 2021-10-04

## 2021-10-04 NOTE — CARE COORDINATION
Ambulatory Care Coordination Note  10/4/2021  CM Risk Score: 6  Charlson 10 Year Mortality Risk Score: 79%     ACC: Angy Luong    Summary Note: Fab Bennett reports she left the SNF because \"I wanted to go home\". She has not had home care come to her home. She states the SNF spoke about getting her home care but they did \"sloppy work\". She reports the wound to her R index finger is open to care currently as she can not dress the wound herself. TC to [facility] seeking information concerning a home care agency at discharge. Beverly answered the phone. She takes 's name and contact information. She reports the \"\" will call  back. TC to Fab Bennett with an update concerning home care. She is tearful talking about the \"mess\" her daughter and grand children left her home in. CM discussed briefly about rehab for Fab Bennett. She was \"clean\" for some time and now again she is using drugs. She stated she will think about rehab. Plan:  Call [facility] again tomorrow morning if no return by 10 AM.      Care Coordination Interventions    Program Enrollment: Complex Care  Referral from Primary Care Provider: No  Suggested Interventions and Community Resources  Diabetes Education: Not Started  Grief Counselor: Not Started  [address]: Completed (Comment: [address])  Medi Set or Pill Pack: Not Started  Physical Therapy: Not Started  Registered Dietician: Not Started  Social Work: Completed  Transportation Support: In Process  Zone Management Tools: Completed (Comment: DM)         Goals Addressed    None         Prior to Admission medications    Medication Sig Start Date End Date Taking?  Authorizing Provider   amoxicillin (AMOXIL) 500 MG capsule Take 2 capsules by mouth 2 times daily for 21 days 9/24/21 10/15/21  Maylin Michel MD   fluconazole (DIFLUCAN) 200 MG tablet Take 2 tablets by mouth daily for 21 days Till 10/11/21 - 21 days  Watch LFT 2 x per week 9/20/21 10/11/21  Macey Cuba MD cetirizine (ZYRTEC) 10 MG tablet Take 1 tablet by mouth daily 8/9/21   Jose Macedo MD   insulin glargine (LANTUS SOLOSTAR) 100 UNIT/ML injection pen Inject 20 Units into the skin 2 times daily 8/9/21   Jose Macedo MD   pregabalin (LYRICA) 75 MG capsule Take 1 capsule by mouth 2 times daily for 30 days. Patient taking differently: Take 100 mg by mouth 2 times daily.   8/9/21 9/8/21  Bryan Garay MD   pantoprazole (PROTONIX) 20 MG tablet Take 1 tablet by mouth 2 times daily (before meals) 7/22/21   Alex Tavares MD   metFORMIN (GLUCOPHAGE) 1000 MG tablet Take 1 tablet by mouth 2 times daily (with meals) 7/22/21   Alex Tavares MD   venlafaxine (EFFEXOR XR) 150 MG extended release capsule Take 1 capsule by mouth daily (with breakfast) 7/22/21   Alex Tavares MD   dicyclomine (BENTYL) 10 MG capsule Take 1 capsule by mouth 4 times daily 7/12/21   Catie Sarmiento MD   Insulin Pen Needle ([employer] PEN NEEDLES 31G) 31G X 8 MM MISC 1 each by Does not apply route daily 1/4/21   Alex Tavares MD    MG capsule TAKE 1 CAPSULE BY MOUTH TWICE DAILY 12/9/20   Alex Tavares MD   traZODone (DESYREL) 150 MG tablet Take 1 tablet by mouth nightly  Patient taking differently: Take 200 mg by mouth nightly  11/18/20   MD JUAN PinedaYLE LITE strip 1 each by Does not apply route 3 times daily 11/11/20   MD Jennifer Pineda Lancets MISC 1 each by Does not apply route 3 times daily 11/11/20   Alex Tavares MD   albuterol sulfate  (90 Base) MCG/ACT inhaler Inhale 2 puffs into the lungs every 4 hours as needed for Wheezing 10/20/20 9/22/21  Alex Tavares MD   FLOVENT  MCG/ACT inhaler Inhale 2 puffs into the lungs 2 times daily  Patient not taking: Reported on 9/22/2021 10/20/20   Alex Tavares MD   budesonide-formoterol (SYMBICORT) 80-4.5 MCG/ACT AERO Inhale 2 puffs into the lungs 2 times daily 10/20/20   Alex Tavares MD       Future Appointments   Date Time Provider Jj Hernandez   10/18/2021  3:15 PM [facility]   10/20/2021  2:15 PM [facility]ell [facility]

## 2021-10-05 ENCOUNTER — CARE COORDINATION (OUTPATIENT)
Dept: CARE COORDINATION | Age: 56
End: 2021-10-05

## 2021-10-05 NOTE — CARE COORDINATION
Ambulatory Care Coordination Note  10/5/2021  CM Risk Score: 6  Charlson 10 Year Mortality Risk Score: 79%     ACC: Amna Duran    Summary Note: TC to [facility] seeking information concerning home care for Egkomi. After much time and 2 attempts CM was given the email address for Geovani Jennings, . Email sent to Geovani Jennings requesting a return call to [phone]. [facility]. Plan:  F/U again at noon today if no communication received from Geovani Jennings. Care Coordination Interventions    Program Enrollment: Complex Care  Referral from Primary Care Provider: No  Suggested Interventions and Community Resources  Diabetes Education: Not Started  Grief Counselor: Not Started  [address]: Completed (Comment: [address])  Medi Set or Pill Pack: Not Started  Physical Therapy: Not Started  Registered Dietician: Not Started  Social Work: Completed  Transportation Support: In Process  Zone Management Tools: Completed (Comment: DM)         Goals Addressed    None         Prior to Admission medications    Medication Sig Start Date End Date Taking? Authorizing Provider   amoxicillin (AMOXIL) 500 MG capsule Take 2 capsules by mouth 2 times daily for 21 days 9/24/21 10/15/21  Maylin Cohen MD   fluconazole (DIFLUCAN) 200 MG tablet Take 2 tablets by mouth daily for 21 days Till 10/11/21 - 21 days  Watch LFT 2 x per week 9/20/21 10/11/21  Maylin Murphy MD   cetirizine (ZYRTEC) 10 MG tablet Take 1 tablet by mouth daily 8/9/21   Jose Macedo MD   insulin glargine (LANTUS SOLOSTAR) 100 UNIT/ML injection pen Inject 20 Units into the skin 2 times daily 8/9/21   Jose Macedo MD   pregabalin (LYRICA) 75 MG capsule Take 1 capsule by mouth 2 times daily for 30 days. Patient taking differently: Take 100 mg by mouth 2 times daily.   8/9/21 9/8/21  Juan Manuel Juarez MD   pantoprazole (PROTONIX) 20 MG tablet Take 1 tablet by mouth 2 times daily (before meals) 7/22/21   Italia Mercado MD   metFORMIN (GLUCOPHAGE) 1000 MG tablet Take 1 tablet by mouth 2 times daily (with meals) 7/22/21   Kacie Wong MD   venlafaxine (EFFEXOR XR) 150 MG extended release capsule Take 1 capsule by mouth daily (with breakfast) 7/22/21   Kacie Wong MD   dicyclomine (BENTYL) 10 MG capsule Take 1 capsule by mouth 4 times daily 7/12/21   Genet Hernandez MD   Insulin Pen Needle ([employer] PEN NEEDLES 31G) 31G X 8 MM MISC 1 each by Does not apply route daily 1/4/21   Kacie Wong MD    MG capsule TAKE 1 CAPSULE BY MOUTH TWICE DAILY 12/9/20   Kacie Wong MD   traZODone (DESYREL) 150 MG tablet Take 1 tablet by mouth nightly  Patient taking differently: Take 200 mg by mouth nightly  11/18/20   Kacie Wong MD   FREESTYLE LITE strip 1 each by Does not apply route 3 times daily 11/11/20   Kacie Wong MD   FreeStyle Lancets MISC 1 each by Does not apply route 3 times daily 11/11/20   Kacie Wong MD   albuterol sulfate  (90 Base) MCG/ACT inhaler Inhale 2 puffs into the lungs every 4 hours as needed for Wheezing 10/20/20 9/22/21  Kacie Wong MD   FLOVENT  MCG/ACT inhaler Inhale 2 puffs into the lungs 2 times daily  Patient not taking: Reported on 9/22/2021 10/20/20   Kacie Wong MD   budesonide-formoterol (SYMBICORT) 80-4.5 MCG/ACT AERO Inhale 2 puffs into the lungs 2 times daily 10/20/20   Kacie Wong MD       Future Appointments   Date Time Provider Jj Hernandez   10/18/2021  3:15 PM Kacie Wong MD [facility]   10/20/2021  2:15 PM Jess Arevalo MD INFT DISEASE [facility] Jones

## 2021-10-05 NOTE — CARE COORDINATION
STEPHON/Mikayla Chand requested that the [facility]. arrange for patient to get to ED, believing that she  has injured her finger on the hand that she had surgery on. HC phoned [facility]  transportation/Katarina Saavedra and was told that they can't transport to ED. This information  was shared with STEPHON/Mikayla Chand. STEPHON/Mikayla Chand will handle the situation from this point.     Plan of Care  [facility] will follow up with patient to arrange  medical transportation

## 2021-10-05 NOTE — CARE COORDINATION
Initial Contact Social Work Note - Ambulatory  10/5/2021        Identified Needs:   Nursing and Aide services          Social Work Plan:   Patient contacted the [facility] to inquire about a nurse coming to her home. [facility]. asked   patient if she had spoken with ACM/Mikayla Strauss, patient stated that she had and    was told that [facility] would be making calls on her behalf. Patient informed the patient   that she would let the ACM know about her inquiry. Héctor Manuel    Next Steps:  HC will follow up with patient to arrange transportation for medical    appointments.       Goals Addressed    None

## 2021-10-05 NOTE — CARE COORDINATION
Ambulatory Care Coordination Note  10/5/2021  CM Risk Score: 6  Charlson 10 Year Mortality Risk Score: 79%     ACC: Chetan Moran    Summary Note: Leslie Babcock called CM to report her \"finger is broke\". She states she was doing some dishes and hit her R index finger on the sink. She states she \"can tell the tip is not connected to bone\". She is agreeable to go back to the ED to have the finger evaluated. She does not have transportation. CM informed Leslie Babcock she will call Janiece Nyhan for assistance with transportation. Janiece Nyhan reports she will call the hospitality services seeking transportation. She will communicate with [facility] and message CM. Plan:  Continue an attempt to secure home care. F/U on ED visit. Janiece Nyhan reports per her conversation with Carol Corcoran, the hospitality service can not transport to the ED. TC to Leslie Babcock to let her know she will need to call 911. Plan:   F/U on ED visit. Continue to seek out home care. Care Coordination Interventions    Program Enrollment: Complex Care  Referral from Primary Care Provider: No  Suggested Interventions and Community Resources  Diabetes Education: Not Started  Grief Counselor: Not Started  [address]: Completed (Comment: [address])  Medi Set or Pill Pack: Not Started  Physical Therapy: Not Started  Registered Dietician: Not Started  Social Work: Completed  Transportation Support: In Process  Zone Management Tools: Completed (Comment: DM)         Goals Addressed    None         Prior to Admission medications    Medication Sig Start Date End Date Taking?  Authorizing Provider   amoxicillin (AMOXIL) 500 MG capsule Take 2 capsules by mouth 2 times daily for 21 days 9/24/21 10/15/21  Maylin Araujo MD   fluconazole (DIFLUCAN) 200 MG tablet Take 2 tablets by mouth daily for 21 days Till 10/11/21 - 21 days  Watch LFT 2 x per week 9/20/21 10/11/21  Maylin Ann MD   cetirizine (ZYRTEC) 10 MG tablet Take 1 tablet by mouth daily 8/9/21 Brenda Diallo MD   insulin glargine (LANTUS SOLOSTAR) 100 UNIT/ML injection pen Inject 20 Units into the skin 2 times daily 8/9/21   Jose Macedo MD   pregabalin (LYRICA) 75 MG capsule Take 1 capsule by mouth 2 times daily for 30 days. Patient taking differently: Take 100 mg by mouth 2 times daily.   8/9/21 9/8/21  Brenda Diallo MD   pantoprazole (PROTONIX) 20 MG tablet Take 1 tablet by mouth 2 times daily (before meals) 7/22/21   Adriel Spain MD   metFORMIN (GLUCOPHAGE) 1000 MG tablet Take 1 tablet by mouth 2 times daily (with meals) 7/22/21   Adriel Spain MD   venlafaxine (EFFEXOR XR) 150 MG extended release capsule Take 1 capsule by mouth daily (with breakfast) 7/22/21   Adriel Spain MD   dicyclomine (BENTYL) 10 MG capsule Take 1 capsule by mouth 4 times daily 7/12/21   Zach Pfeiffer MD   Insulin Pen Needle ([employer] PEN NEEDLES 31G) 31G X 8 MM MISC 1 each by Does not apply route daily 1/4/21   Adriel Spain MD    MG capsule TAKE 1 CAPSULE BY MOUTH TWICE DAILY 12/9/20   Adriel Spain MD   traZODone (DESYREL) 150 MG tablet Take 1 tablet by mouth nightly  Patient taking differently: Take 200 mg by mouth nightly  11/18/20   MD JUAN CabralesYLE LITE strip 1 each by Does not apply route 3 times daily 11/11/20   MD Stefany CabralesStyle Lancets MISC 1 each by Does not apply route 3 times daily 11/11/20   Adriel Spain MD   albuterol sulfate  (90 Base) MCG/ACT inhaler Inhale 2 puffs into the lungs every 4 hours as needed for Wheezing 10/20/20 9/22/21  Adriel Spain MD   FLOVENT  MCG/ACT inhaler Inhale 2 puffs into the lungs 2 times daily  Patient not taking: Reported on 9/22/2021 10/20/20   Adriel Spain MD   budesonide-formoterol (SYMBICORT) 80-4.5 MCG/ACT AERO Inhale 2 puffs into the lungs 2 times daily 10/20/20   Adriel Spain MD       Future Appointments   Date Time Provider Jj Hernandez   10/18/2021  3:15 PM Adriel Spain MD [facility]   10/20/2021  2:15 PM [facility]ina [facility]

## 2021-10-06 ENCOUNTER — CARE COORDINATION (OUTPATIENT)
Dept: CARE COORDINATION | Age: 56
End: 2021-10-06

## 2021-10-06 ENCOUNTER — APPOINTMENT (OUTPATIENT)
Dept: GENERAL RADIOLOGY | Age: 56
End: 2021-10-06
Payer: COMMERCIAL

## 2021-10-06 ENCOUNTER — HOSPITAL ENCOUNTER (INPATIENT)
Age: 56
LOS: 4 days | Discharge: HOME | End: 2021-10-12
Attending: EMERGENCY MEDICINE | Admitting: STUDENT IN AN ORGANIZED HEALTH CARE EDUCATION/TRAINING PROGRAM
Payer: COMMERCIAL

## 2021-10-06 DIAGNOSIS — S68.119A AMPUTATION OF FINGER, INITIAL ENCOUNTER: ICD-10-CM

## 2021-10-06 DIAGNOSIS — S61.200A OPEN WOUND OF RIGHT INDEX FINGER: ICD-10-CM

## 2021-10-06 DIAGNOSIS — T50.B95A ADVERSE EFFECT OF COVID-19 VACCINE: Primary | ICD-10-CM

## 2021-10-06 LAB
ABSOLUTE EOS #: 0.16 K/UL (ref 0–0.44)
ABSOLUTE IMMATURE GRANULOCYTE: <0.03 K/UL (ref 0–0.3)
ABSOLUTE LYMPH #: 1.11 K/UL (ref 1.1–3.7)
ABSOLUTE MONO #: 0.57 K/UL (ref 0.1–1.2)
ANION GAP SERPL CALCULATED.3IONS-SCNC: 16 MMOL/L (ref 9–17)
BASOPHILS # BLD: 0 % (ref 0–2)
BASOPHILS ABSOLUTE: <0.03 K/UL (ref 0–0.2)
BUN BLDV-MCNC: 12 MG/DL (ref 6–20)
BUN/CREAT BLD: ABNORMAL (ref 9–20)
C-REACTIVE PROTEIN: 34.1 MG/L (ref 0–5)
CALCIUM SERPL-MCNC: 8.9 MG/DL (ref 8.6–10.4)
CHLORIDE BLD-SCNC: 99 MMOL/L (ref 98–107)
CHP ED QC CHECK: NORMAL
CO2: 20 MMOL/L (ref 20–31)
CREAT SERPL-MCNC: 0.68 MG/DL (ref 0.5–0.9)
DIFFERENTIAL TYPE: ABNORMAL
EOSINOPHILS RELATIVE PERCENT: 3 % (ref 1–4)
GFR AFRICAN AMERICAN: >60 ML/MIN
GFR NON-AFRICAN AMERICAN: >60 ML/MIN
GFR SERPL CREATININE-BSD FRML MDRD: ABNORMAL ML/MIN/{1.73_M2}
GFR SERPL CREATININE-BSD FRML MDRD: ABNORMAL ML/MIN/{1.73_M2}
GLUCOSE BLD-MCNC: 182 MG/DL
GLUCOSE BLD-MCNC: 182 MG/DL (ref 65–105)
GLUCOSE BLD-MCNC: 265 MG/DL (ref 65–105)
GLUCOSE BLD-MCNC: 286 MG/DL (ref 70–99)
HCT VFR BLD CALC: 30.5 % (ref 36.3–47.1)
HEMOGLOBIN: 9.7 G/DL (ref 11.9–15.1)
IMMATURE GRANULOCYTES: 0 %
LYMPHOCYTES # BLD: 20 % (ref 24–43)
MCH RBC QN AUTO: 32.4 PG (ref 25.2–33.5)
MCHC RBC AUTO-ENTMCNC: 31.8 G/DL (ref 28.4–34.8)
MCV RBC AUTO: 102 FL (ref 82.6–102.9)
MONOCYTES # BLD: 10 % (ref 3–12)
NRBC AUTOMATED: 0 PER 100 WBC
PDW BLD-RTO: 13.3 % (ref 11.8–14.4)
PLATELET # BLD: 351 K/UL (ref 138–453)
PLATELET ESTIMATE: ABNORMAL
PMV BLD AUTO: 10.2 FL (ref 8.1–13.5)
POTASSIUM SERPL-SCNC: 3.9 MMOL/L (ref 3.7–5.3)
RBC # BLD: 2.99 M/UL (ref 3.95–5.11)
RBC # BLD: ABNORMAL 10*6/UL
SARS-COV-2, RAPID: NOT DETECTED
SEDIMENTATION RATE, ERYTHROCYTE: 122 MM (ref 0–30)
SEG NEUTROPHILS: 67 % (ref 36–65)
SEGMENTED NEUTROPHILS ABSOLUTE COUNT: 3.59 K/UL (ref 1.5–8.1)
SODIUM BLD-SCNC: 135 MMOL/L (ref 135–144)
SPECIMEN DESCRIPTION: NORMAL
TROPONIN INTERP: ABNORMAL
TROPONIN INTERP: ABNORMAL
TROPONIN T: ABNORMAL NG/ML
TROPONIN T: ABNORMAL NG/ML
TROPONIN, HIGH SENSITIVITY: 17 NG/L (ref 0–14)
TROPONIN, HIGH SENSITIVITY: 18 NG/L (ref 0–14)
WBC # BLD: 5.5 K/UL (ref 3.5–11.3)
WBC # BLD: ABNORMAL 10*3/UL

## 2021-10-06 PROCEDURE — 6370000000 HC RX 637 (ALT 250 FOR IP): Performed by: STUDENT IN AN ORGANIZED HEALTH CARE EDUCATION/TRAINING PROGRAM

## 2021-10-06 PROCEDURE — 96375 TX/PRO/DX INJ NEW DRUG ADDON: CPT

## 2021-10-06 PROCEDURE — 85025 COMPLETE CBC W/AUTO DIFF WBC: CPT

## 2021-10-06 PROCEDURE — G0378 HOSPITAL OBSERVATION PER HR: HCPCS

## 2021-10-06 PROCEDURE — 96366 THER/PROPH/DIAG IV INF ADDON: CPT

## 2021-10-06 PROCEDURE — 2580000003 HC RX 258: Performed by: STUDENT IN AN ORGANIZED HEALTH CARE EDUCATION/TRAINING PROGRAM

## 2021-10-06 PROCEDURE — 86140 C-REACTIVE PROTEIN: CPT

## 2021-10-06 PROCEDURE — 6370000000 HC RX 637 (ALT 250 FOR IP): Performed by: INTERNAL MEDICINE

## 2021-10-06 PROCEDURE — 6360000002 HC RX W HCPCS: Performed by: INTERNAL MEDICINE

## 2021-10-06 PROCEDURE — 2500000003 HC RX 250 WO HCPCS: Performed by: STUDENT IN AN ORGANIZED HEALTH CARE EDUCATION/TRAINING PROGRAM

## 2021-10-06 PROCEDURE — 99284 EMERGENCY DEPT VISIT MOD MDM: CPT

## 2021-10-06 PROCEDURE — 96367 TX/PROPH/DG ADDL SEQ IV INF: CPT

## 2021-10-06 PROCEDURE — 96365 THER/PROPH/DIAG IV INF INIT: CPT

## 2021-10-06 PROCEDURE — 93005 ELECTROCARDIOGRAM TRACING: CPT | Performed by: STUDENT IN AN ORGANIZED HEALTH CARE EDUCATION/TRAINING PROGRAM

## 2021-10-06 PROCEDURE — 73130 X-RAY EXAM OF HAND: CPT

## 2021-10-06 PROCEDURE — 80048 BASIC METABOLIC PNL TOTAL CA: CPT

## 2021-10-06 PROCEDURE — 82947 ASSAY GLUCOSE BLOOD QUANT: CPT

## 2021-10-06 PROCEDURE — 6360000002 HC RX W HCPCS: Performed by: STUDENT IN AN ORGANIZED HEALTH CARE EDUCATION/TRAINING PROGRAM

## 2021-10-06 PROCEDURE — 2580000003 HC RX 258: Performed by: INTERNAL MEDICINE

## 2021-10-06 PROCEDURE — 85652 RBC SED RATE AUTOMATED: CPT

## 2021-10-06 PROCEDURE — 87635 SARS-COV-2 COVID-19 AMP PRB: CPT

## 2021-10-06 PROCEDURE — 84484 ASSAY OF TROPONIN QUANT: CPT

## 2021-10-06 RX ORDER — DEXTROSE MONOHYDRATE 25 G/50ML
12.5 INJECTION, SOLUTION INTRAVENOUS PRN
Status: DISCONTINUED | OUTPATIENT
Start: 2021-10-06 | End: 2021-10-12 | Stop reason: HOSPADM

## 2021-10-06 RX ORDER — MORPHINE SULFATE 4 MG/ML
4 INJECTION, SOLUTION INTRAMUSCULAR; INTRAVENOUS ONCE
Status: COMPLETED | OUTPATIENT
Start: 2021-10-06 | End: 2021-10-06

## 2021-10-06 RX ORDER — SODIUM CHLORIDE 0.9 % (FLUSH) 0.9 %
5-40 SYRINGE (ML) INJECTION PRN
Status: DISCONTINUED | OUTPATIENT
Start: 2021-10-06 | End: 2021-10-12 | Stop reason: HOSPADM

## 2021-10-06 RX ORDER — DEXTROSE MONOHYDRATE 50 MG/ML
100 INJECTION, SOLUTION INTRAVENOUS PRN
Status: DISCONTINUED | OUTPATIENT
Start: 2021-10-06 | End: 2021-10-12 | Stop reason: HOSPADM

## 2021-10-06 RX ORDER — POTASSIUM CHLORIDE 7.45 MG/ML
10 INJECTION INTRAVENOUS PRN
Status: DISCONTINUED | OUTPATIENT
Start: 2021-10-06 | End: 2021-10-12 | Stop reason: HOSPADM

## 2021-10-06 RX ORDER — SODIUM CHLORIDE 9 MG/ML
25 INJECTION, SOLUTION INTRAVENOUS PRN
Status: DISCONTINUED | OUTPATIENT
Start: 2021-10-06 | End: 2021-10-12 | Stop reason: HOSPADM

## 2021-10-06 RX ORDER — NICOTINE POLACRILEX 4 MG
15 LOZENGE BUCCAL PRN
Status: DISCONTINUED | OUTPATIENT
Start: 2021-10-06 | End: 2021-10-12 | Stop reason: HOSPADM

## 2021-10-06 RX ORDER — FLUCONAZOLE 200 MG/1
400 TABLET ORAL DAILY
Status: DISCONTINUED | OUTPATIENT
Start: 2021-10-07 | End: 2021-10-06 | Stop reason: SDUPTHER

## 2021-10-06 RX ORDER — SODIUM CHLORIDE 9 MG/ML
INJECTION, SOLUTION INTRAVENOUS CONTINUOUS
Status: DISCONTINUED | OUTPATIENT
Start: 2021-10-06 | End: 2021-10-09

## 2021-10-06 RX ORDER — POTASSIUM CHLORIDE 20 MEQ/1
40 TABLET, EXTENDED RELEASE ORAL PRN
Status: DISCONTINUED | OUTPATIENT
Start: 2021-10-06 | End: 2021-10-12 | Stop reason: HOSPADM

## 2021-10-06 RX ORDER — MORPHINE SULFATE 4 MG/ML
4 INJECTION, SOLUTION INTRAMUSCULAR; INTRAVENOUS ONCE
Status: COMPLETED | OUTPATIENT
Start: 2021-10-06 | End: 2021-10-07

## 2021-10-06 RX ORDER — ONDANSETRON 2 MG/ML
4 INJECTION INTRAMUSCULAR; INTRAVENOUS EVERY 4 HOURS PRN
Status: DISCONTINUED | OUTPATIENT
Start: 2021-10-06 | End: 2021-10-12 | Stop reason: HOSPADM

## 2021-10-06 RX ORDER — FLUCONAZOLE 200 MG/1
400 TABLET ORAL EVERY 24 HOURS
Status: DISCONTINUED | OUTPATIENT
Start: 2021-10-06 | End: 2021-10-12 | Stop reason: HOSPADM

## 2021-10-06 RX ORDER — VENLAFAXINE HYDROCHLORIDE 150 MG/1
150 CAPSULE, EXTENDED RELEASE ORAL
Status: DISCONTINUED | OUTPATIENT
Start: 2021-10-07 | End: 2021-10-12 | Stop reason: HOSPADM

## 2021-10-06 RX ORDER — SODIUM CHLORIDE 0.9 % (FLUSH) 0.9 %
5-40 SYRINGE (ML) INJECTION EVERY 12 HOURS SCHEDULED
Status: DISCONTINUED | OUTPATIENT
Start: 2021-10-06 | End: 2021-10-12 | Stop reason: HOSPADM

## 2021-10-06 RX ORDER — ACETAMINOPHEN 325 MG/1
650 TABLET ORAL ONCE
Status: COMPLETED | OUTPATIENT
Start: 2021-10-06 | End: 2021-10-06

## 2021-10-06 RX ORDER — TRAZODONE HYDROCHLORIDE 100 MG/1
200 TABLET ORAL NIGHTLY
Status: DISCONTINUED | OUTPATIENT
Start: 2021-10-06 | End: 2021-10-12 | Stop reason: HOSPADM

## 2021-10-06 RX ORDER — POLYETHYLENE GLYCOL 3350 17 G/17G
17 POWDER, FOR SOLUTION ORAL DAILY PRN
Status: DISCONTINUED | OUTPATIENT
Start: 2021-10-06 | End: 2021-10-10

## 2021-10-06 RX ORDER — ACETAMINOPHEN 650 MG/1
650 SUPPOSITORY RECTAL EVERY 6 HOURS PRN
Status: DISCONTINUED | OUTPATIENT
Start: 2021-10-06 | End: 2021-10-12 | Stop reason: HOSPADM

## 2021-10-06 RX ORDER — ACETAMINOPHEN 325 MG/1
650 TABLET ORAL EVERY 6 HOURS PRN
Status: DISCONTINUED | OUTPATIENT
Start: 2021-10-06 | End: 2021-10-12 | Stop reason: HOSPADM

## 2021-10-06 RX ADMIN — Medication 1500 MG: at 17:09

## 2021-10-06 RX ADMIN — INSULIN LISPRO 3 UNITS: 100 INJECTION, SOLUTION INTRAVENOUS; SUBCUTANEOUS at 20:51

## 2021-10-06 RX ADMIN — SODIUM CHLORIDE, PRESERVATIVE FREE 10 ML: 5 INJECTION INTRAVENOUS at 23:36

## 2021-10-06 RX ADMIN — MORPHINE SULFATE 4 MG: 4 INJECTION INTRAVENOUS at 16:47

## 2021-10-06 RX ADMIN — SODIUM CHLORIDE: 9 INJECTION, SOLUTION INTRAVENOUS at 20:54

## 2021-10-06 RX ADMIN — ONDANSETRON 4 MG: 2 INJECTION INTRAMUSCULAR; INTRAVENOUS at 23:34

## 2021-10-06 RX ADMIN — CEFTAROLINE FOSAMIL 600 MG: 600 POWDER, FOR SOLUTION INTRAVENOUS at 23:33

## 2021-10-06 RX ADMIN — ACETAMINOPHEN 650 MG: 325 TABLET ORAL at 15:24

## 2021-10-06 RX ADMIN — FAMOTIDINE 20 MG: 10 INJECTION INTRAVENOUS at 23:33

## 2021-10-06 RX ADMIN — PIPERACILLIN AND TAZOBACTAM 3375 MG: 3; .375 INJECTION, POWDER, FOR SOLUTION INTRAVENOUS at 16:47

## 2021-10-06 RX ADMIN — FLUCONAZOLE 400 MG: 200 TABLET ORAL at 23:33

## 2021-10-06 ASSESSMENT — ENCOUNTER SYMPTOMS
PHOTOPHOBIA: 0
RHINORRHEA: 0
COLOR CHANGE: 1
VOMITING: 0
ANAL BLEEDING: 0
ABDOMINAL DISTENTION: 0
NAUSEA: 0
APNEA: 0
EYE DISCHARGE: 0
CONSTIPATION: 0
DIARRHEA: 0
SINUS PAIN: 1
CHEST TIGHTNESS: 0
SORE THROAT: 0
SHORTNESS OF BREATH: 1

## 2021-10-06 ASSESSMENT — PAIN SCALES - GENERAL
PAINLEVEL_OUTOF10: 8
PAINLEVEL_OUTOF10: 9

## 2021-10-06 NOTE — CARE COORDINATION
Ambulatory Care Coordination Note  10/6/2021  CM Risk Score: 6  Charlson 10 Year Mortality Risk Score: 79%     ACC: Antonio Keep    Summary Note: Donna Coronel did not go to the ED as directed yesterday. She reports she did get a dose of COVID vaccine yesterday however. She states she is feeling ill today. She gave no reason for not going to the ED. She reports she will go later today. Plan:  F/U later today to ensure Donna Coronel goes to the ED. Care Coordination Interventions    Program Enrollment: Complex Care  Referral from Primary Care Provider: No  Suggested Interventions and Community Resources  Diabetes Education: Not Started  Grief Counselor: Not Started  [address]: Completed (Comment: [address])  Medi Set or Pill Pack: Not Started  Physical Therapy: Not Started  Registered Dietician: Not Started  Social Work: Completed  Transportation Support: In Process  Zone Management Tools: Completed (Comment: DM)         Goals Addressed    None         Prior to Admission medications    Medication Sig Start Date End Date Taking? Authorizing Provider   amoxicillin (AMOXIL) 500 MG capsule Take 2 capsules by mouth 2 times daily for 21 days 9/24/21 10/15/21  Maylin Cotto MD   fluconazole (DIFLUCAN) 200 MG tablet Take 2 tablets by mouth daily for 21 days Till 10/11/21 - 21 days  Watch LFT 2 x per week 9/20/21 10/11/21  Maylin Bee MD   cetirizine (ZYRTEC) 10 MG tablet Take 1 tablet by mouth daily 8/9/21   Jose Macedo MD   insulin glargine (LANTUS SOLOSTAR) 100 UNIT/ML injection pen Inject 20 Units into the skin 2 times daily 8/9/21   Jose Macedo MD   pregabalin (LYRICA) 75 MG capsule Take 1 capsule by mouth 2 times daily for 30 days. Patient taking differently: Take 100 mg by mouth 2 times daily.   8/9/21 9/8/21  Jenny Ramsey MD   pantoprazole (PROTONIX) 20 MG tablet Take 1 tablet by mouth 2 times daily (before meals) 7/22/21   Jose L Ramos MD   metFORMIN (GLUCOPHAGE) 1000 MG tablet Take 1 tablet by mouth 2 times daily (with meals) 7/22/21   Alex Tavares MD   venlafaxine (EFFEXOR XR) 150 MG extended release capsule Take 1 capsule by mouth daily (with breakfast) 7/22/21   Alex Tavares MD   dicyclomine (BENTYL) 10 MG capsule Take 1 capsule by mouth 4 times daily 7/12/21   Catie Sarmiento MD   Insulin Pen Needle ([employer] PEN NEEDLES 31G) 31G X 8 MM MISC 1 each by Does not apply route daily 1/4/21   Alex Tavares MD    MG capsule TAKE 1 CAPSULE BY MOUTH TWICE DAILY 12/9/20   Alex Tavares MD   traZODone (DESYREL) 150 MG tablet Take 1 tablet by mouth nightly  Patient taking differently: Take 200 mg by mouth nightly  11/18/20   MD ELIZABETH PinedaSTYLE LITE strip 1 each by Does not apply route 3 times daily 11/11/20   MD Jennifer Pineda Lancets MISC 1 each by Does not apply route 3 times daily 11/11/20   Alex Tavares MD   albuterol sulfate  (90 Base) MCG/ACT inhaler Inhale 2 puffs into the lungs every 4 hours as needed for Wheezing 10/20/20 9/22/21  Alex Tavares MD   FLOVENT  MCG/ACT inhaler Inhale 2 puffs into the lungs 2 times daily  Patient not taking: Reported on 9/22/2021 10/20/20   Alex Tavares MD   budesonide-formoterol (SYMBICORT) 80-4.5 MCG/ACT AERO Inhale 2 puffs into the lungs 2 times daily 10/20/20   Alex Tavares MD       Future Appointments   Date Time Provider Jj Hernandez   10/18/2021  3:15 PM MD Clarita Pineda  [id number]   10/20/2021  2:15 PM Castillo Kay MD INFT DISEASE [facility]

## 2021-10-06 NOTE — CONSULTS
Plastic Surgery Consult      CC/Reason for consult:  Right index finger open wound    HPI:      The patient is a 49 y.o. female with an open wound to the right index finger. She presents to the ED today after she had severe pain of her finger. Patient had an incision and drainage by Dr. Mari Grace for the open digit on 9/14 for an abscess. Patient states she also may have hit her finger on a piece of furniture and thinks she might have broke her finger. Wound appears to be dehisced with exposed bone and tendons are seen through the open wound. She states there has been purulent drainage through the wound. She states she has taken her antibiotics as prescribed. Image of the open wound seen below. She cannot flex her finger she says. The patient self wraps and self irrigated herself with saline. CRP taken today was 34. GCS 15. Vitals were reviewed. Patient does not complain of any other orthopedic issues.     Past Medical History:    Past Medical History:   Diagnosis Date    Acid reflux 5/29/2017    Acute cystitis with hematuria 10/11/2016    Acute non-recurrent maxillary sinusitis 11/28/2017    Asthma     Bipolar 1 disorder ([address] 1/4/2018    Bipolar disorder, mixed ([address]     BMI 34.0-34.9,adult 11/28/2017    Cannabis use disorder, severe, dependence ([address] 12/19/2017    Cerebrovascular accident (CVA) ([address] 6/14/2017    Chest pain 11/5/2016    Chronic renal insufficiency     Cocaine abuse ([address] 1/5/2018    COVID-19 virus RNA test result unknown     DDD (degenerative disc disease), cervical     Diabetes mellitus ([address]     Dizziness 6/13/2017    Fibromyalgia     History of stroke 9/9/2017    Homicidal ideation 11/6/2017    Hyperglycemia     Hypertension     Hypotension 1/18/2019    IDDM (insulin dependent diabetes mellitus) 12/21/2015    Lupus ([address]     Migraine     Neuropathy     Neuropathy     Polysubstance abuse ([address] 9/17/2017    Post traumatic stress disorder (PTSD)     Posttraumatic stress disorder 5/29/2017    Recurrent depression ([facility] 5/29/2017    Recurrent major depressive disorder, in partial remission ([address] 11/28/2017    Screening mammogram, encounter for 11/28/2017    Severe recurrent major depression with psychotic features ([address] 12/19/2017    Severe recurrent major depression without psychotic features ([address] 12/19/2017    Stroke (cerebrum) ([facility] 6/14/2017    Stroke ([facility]     per chart notes    Suicidal ideation     Suicidal intent 3/10/2017    Vitamin D deficiency 11/28/2017    White matter changes 6/13/2017       Past Surgical History:    Past Surgical History:   Procedure Laterality Date    ABDOMEN SURGERY      drain tube    ABSCESS DRAINAGE      right buttock    CATARACT REMOVAL WITH IMPLANT Bilateral     CHEST TUBE INSERTION      FINGER AMPUTATION Left 03/13/2021    LEFT RING FINER AMPUTATION performed by Hilario Myers MD at [address] Right 09/15/2021     I&D INDEX FINGER     HAND SURGERY Right 09/14/2021    I&D INDEX FINGER performed by Hilario Myers MD at [address] Right 9/15/2021    I&D INDEX FINGER performed by Hilario Myers MD at [facility] LAS Bilateral        Medications Prior to Admission:   Prior to Admission medications    Medication Sig Start Date End Date Taking? Authorizing Provider   amoxicillin (AMOXIL) 500 MG capsule Take 2 capsules by mouth 2 times daily for 21 days 9/24/21 10/15/21  Maylin Orozco MD   fluconazole (DIFLUCAN) 200 MG tablet Take 2 tablets by mouth daily for 21 days Till 10/11/21 - 21 days  Watch LFT 2 x per week 9/20/21 10/11/21  Maylin Del Toro MD   cetirizine (ZYRTEC) 10 MG tablet Take 1 tablet by mouth daily 8/9/21   Jose Macedo MD   insulin glargine (LANTUS SOLOSTAR) 100 UNIT/ML injection pen Inject 20 Units into the skin 2 times daily 8/9/21   Jose Macedo MD   pregabalin (LYRICA) 75 MG capsule Take 1 capsule by mouth 2 times daily for 30 days.   Patient taking differently: Smokeless tobacco: Never Used   Vaping Use    Vaping Use: Never used   Substance and Sexual Activity    Alcohol use: Not Currently    Drug use: Yes     Types: Marijuana, Cocaine     Comment: + Cocaine 2/2021 also, see Care Everywhere UDS    Sexual activity: Not Currently     Partners: Male   Other Topics Concern    None   Social History Narrative    None     Social Determinants of Health     Financial Resource Strain: High Risk    Difficulty of Paying Living Expenses: Hard   Food Insecurity: Food Insecurity Present    Worried About Running Out of Food in the Last Year: Often true    Kelin of Food in the Last Year: Often true   Transportation Needs: Unmet Transportation Needs    Lack of Transportation (Medical): Yes    Lack of Transportation (Non-Medical): Yes   Physical Activity: Inactive    Days of Exercise per Week: 0 days    Minutes of Exercise per Session: 0 min   Stress: Stress Concern Present    Feeling of Stress : Rather much   Social Connections:     Frequency of Communication with Friends and Family:     Frequency of Social Gatherings with Friends and Family:     Attends Alevism Services:     Active Member of Clubs or Organizations:     Attends Club or Organization Meetings:     Marital Status:    Intimate Partner Violence:     Fear of Current or Ex-Partner:     Emotionally Abused:     Physically Abused:     Sexually Abused:        Family History:  Family History   Problem Relation Age of Onset    Diabetes Mother     Stroke Mother     Diabetes Father     Diabetes Sister     Diabetes Brother     Other Son         GSW    No Known Problems Sister        REVIEW OF SYSTEMS:   Constitutional: Negative for fever and chills. HENT: Negative for congestion. Eyes: Negative for blurred vision and double vision. Respiratory: Negative for cough, shortness of breath and wheezing. Cardiovascular: Negative for chest pain and palpitations. Gastrointestinal: Negative for nausea. Negative for vomiting. Musculoskeletal: As per HPI  Skin: As per HPI  Neurological: Negative for dizziness, sensory change and headaches. Psychiatric/Behavioral: Negative for depression and suicidal ideas. PHYSICAL EXAM:  Blood pressure 133/86, pulse 112, temperature 98.8 °F (37.1 °C), temperature source Oral, resp. rate 16, height 5' 6\" (1.676 m), weight 230 lb (104.3 kg), SpO2 99 %. Gen: alert and oriented, NAD, cooperative    Head: normocephalic atraumatic     Neck: supple    Chest: Non labored breathing. b/l clavicles without TTP, crepitus, step off, or deformity    Cardiovascular: Tachycardic    Respiratory: Chest symmetric, no accessory muscle use, normal respirations    Abdomen: non distended    RUE: Postsurgical wound dehiscence of right index finger, purulent discharge, necrosis, exposed bone, limited range of motion of index finger in all planes. +2 radial pulse, brisk cap refill, sensation intact. Tenderness to palpation about the index finger. Numbness to the right index finger, mostly at tip. Radial pulse 2+ with BCR              LABS:  Recent Labs     10/06/21  1518   WBC 5.5   HGB 9.7*   HCT 30.5*      SEDRATE 122*   CRP 34.1*        Radiology:    3 views of the right hand demonstrated a possible fracture of the middle phalanx of the right hand. Small depression seen at the radial edge of the distal phalanx. A/P: 49 y.o. female being seen for     -Weight bearing: NWB RUE  -NPO  -Possible OR with Dr. Vance Menezes tomorrow (10/7) for revision I&D with possible amputation  -Pain control per primary  -Admitted to Obs  -Continue to trend CRP q48hr  -Dressing/ wound instructions: Maintain dressings. May change if saturated. -DVT ppx: per primary team. Please hold chemical AC prior to OR.  -Discussed case with Dr. Carmen Brand. Will admit under Dr. Vance Menezes for possible or later this week.   -Please page me with any questions or concerns        Attending Physician Statement    I have discussed the case, including pertinent history and exam findings with the resident. I agree with the assessment, plan and orders as documented by the resident.   Tanner Blunt MD on10/15/2021 on 7:13 AM

## 2021-10-06 NOTE — PROGRESS NOTES
Pharmacy Note  Vancomycin Consult    Dontrell Cruz is a 49 y.o. female started on Vancomycin for SSTI; consult received from Dr. Osorio Perez to manage therapy.  Also receiving the following antibiotics: Piperacillin/tazobactam.    Patient Active Problem List   Diagnosis    IDDM (insulin dependent diabetes mellitus)    Lupus (HCC)    Bipolar disorder, mixed ([facility])    Post traumatic stress disorder (PTSD)    Acute cystitis with hematuria    Hyperglycemia    Suicidal ideation    Arthritis    Chronic back pain    Acid reflux    History of stroke    Polysubstance abuse ([address]    Bipolar 1 disorder ([facility])    Cocaine abuse ([address]    Chest pain    Acute non-recurrent maxillary sinusitis    Acute respiratory failure with hypercapnia ([facility])    Alcohol use disorder, severe, dependence ([address]    Asthma exacerbation attacks    Bilateral edema of lower extremity    Bipolar 1 disorder, depressed, severe ([facility])    BMI 34.0-34.9,adult    Cannabis use disorder, severe, dependence ([address]    Cocaine use disorder, severe, dependence ([address]    Dizziness    Dyslipidemia    Family history of colon cancer    History of lupus    Homicidal ideation    Hypotension    Neuropathy    Normocytic anemia    Recurrent depression ([facility])    Recurrent major depressive disorder, in partial remission (HCC)    Severe recurrent major depression with psychotic features (HCC)    Severe recurrent major depression without psychotic features ([address]    Upper GI bleed    Vitamin D deficiency    White matter changes    Gastroesophageal reflux disease    Stroke (cerebrum) ([address]    Rib lesion    Diabetes mellitus type 2 in obese ([facility])    YAEL (generalized anxiety disorder)    Posttraumatic stress disorder    Suicidal intent    Leg weakness, bilateral    Poorly controlled diabetes mellitus ([address]    Acute kidney injury ([address]    Felon of finger    Osteomyelitis ([facility])    Recurrent falls    Seasonal allergic rhinitis    Fibromyalgia    Tenosynovitis of finger    DKA (diabetic ketoacidoses)     Allergies:  Bactrim [sulfamethoxazole-trimethoprim] and Adhesive tape     Temp max: Afebrile    Recent Labs     10/06/21  1518   WBC 5.5     No intake or output data in the 24 hours ending 10/06/21 1618  Culture Date      Source                       Results  Pending  Previously grew MRSA from finger wound 2/2021    Ht Readings from Last 1 Encounters:   10/06/21 5' 6\" (1.676 m)        Wt Readings from Last 1 Encounters:   10/06/21 230 lb (104.3 kg)       Body mass index is 37.12 kg/m². CrCl cannot be calculated (Patient's most recent lab result is older than the maximum 10 days allowed. ). Goal Trough Level: 10-15 mcg/mL    Assessment/Plan:  - In the ER, patient is afebrile, BP stable, tachycardic  - WBC stable at 5.5  - Underwent I&D of finger abscess on 9/14 and has not seen surgeon since. Picture of wound in chart - purulent with bone exposed  - No history of receiving vanco with levels checked. Will dose empirically  - Will initiate Vancomycin with a one time loading dose of 1500 mg x1, followed by 1250 mg IV every 12 hours to achieve a trough of ~14.2 mg/L. Timing of trough level will be determined based on culture results, renal function, and clinical response. Thank you for the consult. Will continue to follow.   Carmen Moreno, PharmD  10/6/2021  5:33 PM

## 2021-10-06 NOTE — FLOWSHEET NOTE
SPIRITUAL CARE DEPARTMENT - [facility]  PROGRESS NOTE    Shift date: 10/6/21  Shift day: Wednesday   Shift # 2    Room # 37/37   Name: Jayda Askew            Age: 49 y.o. Gender: female          [facility]: [address] [facility] [facility]:     Referral: Routine Visit    Admit Date & Time: 10/6/2021  2:33 PM    PATIENT/EVENT DESCRIPTION:  Jayda Askew is a 49 y.o. female who was in ED 40. SPIRITUAL ASSESSMENT/INTERVENTION:  Patient was tearful and coping with what is happening and discussed a new outlook/perspective. Patient engaged in conversation and shared feelings and deep henry in God.  listned and validated patient concerns and struggles as well as her henry.  assessment was to bring reading materials since the patient left her Bible at home and to nurture hope by prayer and including her requests.  brought back Bible, Guideposts, and Daily Bread. Patient stated she would like to see chaplains again and advise to let nurse know to contact spiritual care. Patient appeared to be comforted by  presence and prayer and was thankful for the reading materials. SPIRITUAL CARE FOLLOW-UP PLAN:  Chaplains will remain available to offer spiritual and emotional support as needed. Electronically signed by Andrea Polanco Resident, on 10/6/2021 at 7:50 PM.  [address] [facility]  [phone]       10/06/21 1949   Encounter Summary   Services provided to: Patient   Referral/Consult From: [facility]   Continue Visiting   (10/6/21)   Complexity of Encounter Moderate   Length of Encounter 30 minutes   Spiritual Assessment Completed Yes   Spiritual/Synagogue   Type Spiritual support   Assessment Approachable;Tearful; Anxious; Hopeful;Coping   Intervention Active listening;Explored feelings, thoughts, concerns;Prayer;Provided reading materials/devotional materials;Nurtured hope;Sustaining presence/ Ministry of presence   Outcome Expressed gratitude;Comfort

## 2021-10-06 NOTE — Clinical Note
Patient Class: Observation [104]   REQUIRED: Diagnosis: Open wound of right index finger [[id number]]   Estimated Length of Stay: Estimated stay of less than 2 midnights   Admitting Provider: Jonathon Cardenas [[id number]]   Telemetry/Cardiac Monitoring Required?: No

## 2021-10-06 NOTE — ED PROVIDER NOTES
[facility]  FACULTY HANDOFF       Handoff taken on the following patient from prior Attending Physician:  Pt Name: Mignon Haywood  PCP:  Lucita Mcdonnell MD    Attestation  I was available and discussed any additional care issues that arose and coordinated the management plans with the resident(s) caring for the patient during my duty period. Any areas of disagreement with resident's documentation of care or procedures are noted on the chart. I was personally present for the key portions of any/all procedures during my duty period. I have documented in the chart those procedures where I was not present during the key portions.              Winston Rosales MD  10/06/21 0204 PT DAILY TREATMENT NOTE     Patient Name: Blanco Maldonado  Date:2018  : 1938  [x]  Patient  Verified  [place]or: [place] MEDICARE / Plan: [place] MEDICARE PART A & B / Product Type: Medicare /    In time:230  Out time:332  Total Treatment Time (min): 62  Timed Treatment Time: (min):52  1:1 Treatment Time: (min):52  Treatment Area: Low back pain [M54.5]    SUBJECTIVE  Pain Level (0-10 scale): 310  Any medication changes, allergies to medications, adverse drug reactions, diagnosis change, or new procedure performed?: [x] No    [] Yes (see summary sheet for update)  Subjective functional status/changes:   [] No changes reported  Pt reports that she thinks she is maintaining her mobility    OBJECTIVE    Modality rationale: decrease pain and increase tissue extensibility to improve the patients ability to stand more than 30 minutes   Min Type Additional Details   10 [x] Estim:  [x]Unatt       [x]IFC  []Premod                        []Other:  []w/ice   [x]w/heat  Position:saeated  Location:l0ow back neck    [] Estim: []Att    []TENS instruct  []NMES                    []Other:  []w/US   []w/ice   []w/heat  Position:  Location:    []  Traction: [] Cervical       []Lumbar                       [] Prone          []Supine                       []Intermittent   []Continuous Lbs:  [] before manual  [] after manual    []  Ultrasound: []Continuous   [] Pulsed                           []1MHz   []3MHz W/cm2:  Location:    []  Iontophoresis with dexamethasone         Location: [] Take home patch   [] In clinic         []  Laser with stim  []  Other:  Position:  Location:    []  Vasopneumatic Device Pressure:       [] lo [] med [] hi   Temperature: [] lo [] med [] hi   [x] Skin assessment post-treatment:  [x]intact [x]redness- no adverse reaction     52 min Neuromuscular Re-education:  [x]  See flow sheet :facilitate mobility to neutralize posture   Rationale: increase ROM, increase strength, improve coordination and increase proprioception  to improve the patients ability to stand more than 30 minutes          With   [] TE   [] TA   [x] neuro   [] other: Patient Education: [x] Review HEP    [] Progressed/Changed HEP based on:   [x] positioning   [x] body mechanics   [x] transfers   [x] heat/ice application    [] other:      Other Objective/Functional Measures:      Pain Level (0-10 scale) post treatment/10    ASSESSMENT/Changes in Function: pt demonstrates improving strength and mobility with reduced pain noted between visits. Patient will continue to benefit from skilled PT services to address functional mobility deficits, address ROM deficits, address strength deficits, analyze and address soft tissue restrictions, analyze and cue movement patterns, analyze and modify body mechanics/ergonomics and assess and modify postural abnormalities to attain remaining goals. []  See Plan of Care  []  See progress note/recertification  []  See Discharge Summary         Progress towards goals / Updated goals:  Short Term Goals: To be accomplished in 2 weeks:  1. Patient will be independent and compliant with HEP to achieve other goals. Status at eval: issue on 2nd visit  Current PN: pt reports compliance  2. Pt will report >/= 25% improvement in symptoms to increase activity/position tolerance. Status at eval: 0%  Current PN: Improved 15-20%      Long Term Goals: To be accomplished in 4 weeks:  1. Improve FOTO score to >/= 49/100 to indicate decreased pain with ADL's. Status at eval: 30/100  Current PN: Progressin/100  2. Pt will report >/= 50% improvement in symptoms to increase activity/position tolerance. Status at eval: 0%  Current PN: Improved 15-20%   3. Pt will have 75% of normal lumbar AROM without increased pain to normalize ADL's.   Status at eval: ext: limited 75%, B rot: limited 50% with increased pain  Current PN: progressing: B rot: limited 25%, ext: limited 50%    PLAN  [x]  Upgrade activities as tolerated [x]  Continue plan of care  []  Update interventions per flow sheet       []  Discharge due to:_  []  Other:_      Hanane Esquivel PTA 5/7/2018  10:46 AM    Future Appointments  Date Time Provider Paul Marialuisa   5/7/2018 11:30 AM LORETTA MadrigalHPTVREINALDO [facility]   5/9/2018 4:30 PM LORETTA VasquezHPTABDULKADIR [facility]   5/14/2018 11:45 AM Hanane Esquivel PTA [facility]   5/17/2018 1:45 PM Hanane Esquivel PTA [facility]   5/22/2018 2:00 PM Delia Dumont PTA [facility]   5/24/2018 2:30 PM Hanane Esquivel PTA [facility]   5/29/2018 3:00 PM Delia Dumont PTA [facility]   6/1/2018 1:30 PM Tc Muhammad, PT [facility]

## 2021-10-06 NOTE — CARE COORDINATION
Initial Contact Social Work Note - Ambulatory  10/6/2021        Identified Needs:   Medical transportation  [address]:   [facility] medical [facility] to [facility] on 10[facility]15/21 to [facility]    for a 3:15p appointment. Patient's transportation could be as early as 1:45, and patient    should be ready, transportation will only wait for 5 mins, and if they don't appear it will   as a no-show, confirmation # [id number], appointment for 10/20/21 @ Infectious Disease   doctor @ [place] [address], appointment time is 2:15p, pickup can be as early   as 12:45p. Confirmation # is [id number].     Next Steps:  HC will provide this information will be shared with the patient on 10/07/21      Goals Addressed                 This Visit's Progress       Care Coordination     Community Resource Goal   Improving     Patient states that she needs medical transportation and also has food insecurities. Barriers: fear of failure, lack of motivation, financial, lack of support, overwhelmed by complexity of regimen, stress, time constraints, medication side effects, and lack of education    Plan for overcoming my barriers: [facility] will assist patient with her medical transportation as needed  [facility]. will refer patient to [address] [place] [address] for assistance with getting addition foods in her home.     Confidence: 9/10    Anticipated Goal Completion Date: 08/30/21

## 2021-10-06 NOTE — ED PROVIDER NOTES
[facility]  ED  Emergency Department Encounter  EmergencyMedicine Resident     Pt Name:Terri Perez  MRN: [id number]  [place] 1965  Date of evaluation: 10/6/21  PCP:  Dottie Ernandez MD    This patient was evaluated in the Emergency Department for symptoms described in the history of present illness. The patient was evaluated in the context of the global COVID-19 pandemic, which necessitated consideration that the patient might be at risk for infection with the SARS-CoV-2 virus that causes COVID-19. Institutional protocols and algorithms that pertain to the evaluation of patients at risk for COVID-19 are in a state of rapid change based on information released by regulatory bodies including the CDC and federal and state organizations. These policies and algorithms were followed during the patient's care in the ED. CHIEF COMPLAINT       Chief Complaint   Patient presents with    Chest Pain     started this morning, states she believes she is allergic to the covid vacc    Finger Pain     right pointer finger, previous surgery       HISTORY OF PRESENT ILLNESS  (Location/Symptom, Timing/Onset, Context/Setting, Quality, Duration, Modifying Factors, Severity.)      Libra Proctor is a 49 y.o. female past medical history Lupus, bipolar, uncontrolled diabetes, substance abuse who presents with with 1 day of chest pain, shortness of breath, malaise, ear pain following administration of Moderna Covid vaccine. Patient had Covid in the past and this is her first dose of the vaccine. Patient also states that she thinks she broke her right index finger. Patient had incision and drainage of the right index finger on 9/14 for abscess. Patient states she has not seen her surgeon for this since. Wound appears dehisced with purulent fluid and bone exposed. Patient states she has been taking her antibiotics as prescribed.            PAST MEDICAL / SURGICAL / SOCIAL / FAMILY HISTORY      has a past medical history of Acid reflux, Acute cystitis with hematuria, Acute non-recurrent maxillary sinusitis, Asthma, Bipolar 1 disorder ([facility] Bipolar disorder, mixed ([facility] BMI 34.0-34.9,adult, Cannabis use disorder, severe, dependence ([address] Cerebrovascular accident (CVA) ([address] Chest pain, Chronic renal insufficiency, Cocaine abuse ([facility] COVID-19 virus RNA test result unknown, DDD (degenerative disc disease), cervical, Diabetes mellitus ([address] Dizziness, Fibromyalgia, History of stroke, Homicidal ideation, Hyperglycemia, Hypertension, Hypotension, IDDM (insulin dependent diabetes mellitus), Lupus ([address] Migraine, Neuropathy, Neuropathy, Polysubstance abuse ([address] Post traumatic stress disorder (PTSD), Posttraumatic stress disorder, Recurrent depression ([facility] [address] Recurrent major depressive disorder, in partial remission ([facility] Screening mammogram, encounter for, Severe recurrent major depression with psychotic features ([address] Severe recurrent major depression without psychotic features ([address] Stroke (cerebrum) ([facility] Stroke ([facility] Suicidal ideation, Suicidal intent, Vitamin D deficiency, and White matter changes. has a past surgical history that includes Abscess Drainage; chest tube insertion; Abdomen surgery; Cataract removal with implant (Bilateral); LASIK (Bilateral); Finger amputation (Left, 03/13/2021); Hand surgery (Right, 09/14/2021); Finger surgery (Right, 09/15/2021); and Hand surgery (Right, 9/15/2021).       Social History     Socioeconomic History    Marital status: Legally      Spouse name: Not on file    Number of children: Not on file    Years of education: Not on file    Highest education level: Not on file   Occupational History    Not on file   Tobacco Use    Smoking status: Never Smoker    Smokeless tobacco: Never Used   Vaping Use    Vaping Use: Never used   Substance and Sexual Activity    Alcohol use: Not Currently    Drug use: Yes     Types: Marijuana, Cocaine Comment: + Cocaine 2/2021 also, see Care Everywhere UDS    Sexual activity: Not Currently     Partners: Male   Other Topics Concern    Not on file   Social History Narrative    Not on file     Social Determinants of Health     Financial Resource Strain: High Risk    Difficulty of Paying Living Expenses: Hard   Food Insecurity: Food Insecurity Present    Worried About Running Out of Food in the Last Year: Often true    Kelin of Food in the Last Year: Often true   Transportation Needs: Unmet Transportation Needs    Lack of Transportation (Medical): Yes    Lack of Transportation (Non-Medical): Yes   Physical Activity: Inactive    Days of Exercise per Week: 0 days    Minutes of Exercise per Session: 0 min   Stress: Stress Concern Present    Feeling of Stress : Rather much   Social Connections:     Frequency of Communication with Friends and Family:     Frequency of Social Gatherings with Friends and Family:     Attends [employer] Services:     Active Member of Clubs or Organizations:     Attends Club or Organization Meetings:     Marital Status:    Intimate Partner Violence:     Fear of Current or Ex-Partner:     Emotionally Abused:     Physically Abused:     Sexually Abused:        Family History   Problem Relation Age of Onset    Diabetes Mother     Stroke Mother     Diabetes Father     Diabetes Sister     Diabetes Brother     Other Son         GSW    No Known Problems Sister        Allergies:  Bactrim [sulfamethoxazole-trimethoprim] and Adhesive tape    Home Medications:  Prior to Admission medications    Medication Sig Start Date End Date Taking?  Authorizing Provider   amoxicillin (AMOXIL) 500 MG capsule Take 2 capsules by mouth 2 times daily for 21 days 9/24/21 10/15/21  Maylin Adams MD   fluconazole (DIFLUCAN) 200 MG tablet Take 2 tablets by mouth daily for 21 days Till 10/11/21 - 21 days  Watch LFT 2 x per week 9/20/21 10/11/21  Evens Malcolm MD   cetirizine (ZYRTEC) 10 MG tablet Take 1 tablet by mouth daily 8/9/21   Jose Macedo MD   insulin glargine (LANTUS SOLOSTAR) 100 UNIT/ML injection pen Inject 20 Units into the skin 2 times daily 8/9/21   Jose Macedo MD   pregabalin (LYRICA) 75 MG capsule Take 1 capsule by mouth 2 times daily for 30 days. Patient taking differently: Take 100 mg by mouth 2 times daily.   8/9/21 9/8/21  Alyssia Puentes MD   pantoprazole (PROTONIX) 20 MG tablet Take 1 tablet by mouth 2 times daily (before meals) 7/22/21   Monica Calderon MD   metFORMIN (GLUCOPHAGE) 1000 MG tablet Take 1 tablet by mouth 2 times daily (with meals) 7/22/21   Monica Calderon MD   venlafaxine (EFFEXOR XR) 150 MG extended release capsule Take 1 capsule by mouth daily (with breakfast) 7/22/21   Monica Calderon MD   dicyclomine (BENTYL) 10 MG capsule Take 1 capsule by mouth 4 times daily 7/12/21   Alexandre Tamez MD   Insulin Pen Needle ([employer] PEN NEEDLES 31G) 31G X 8 MM MISC 1 each by Does not apply route daily 1/4/21   Monica Calderon MD    MG capsule TAKE 1 CAPSULE BY MOUTH TWICE DAILY 12/9/20   Monica Calderon MD   traZODone (DESYREL) 150 MG tablet Take 1 tablet by mouth nightly  Patient taking differently: Take 200 mg by mouth nightly  11/18/20   MD ELIZABETH BrownSTYLE LITE strip 1 each by Does not apply route 3 times daily 11/11/20   Monica Calderon MD   FreeStyle Lancets MISC 1 each by Does not apply route 3 times daily 11/11/20   Monica Calderon MD   albuterol sulfate  (90 Base) MCG/ACT inhaler Inhale 2 puffs into the lungs every 4 hours as needed for Wheezing 10/20/20 9/22/21  Monica Calderon MD   FLOVENT  MCG/ACT inhaler Inhale 2 puffs into the lungs 2 times daily  Patient not taking: Reported on 9/22/2021 10/20/20   Monica Calderon MD   budesonide-formoterol (SYMBICORT) 80-4.5 MCG/ACT AERO Inhale 2 puffs into the lungs 2 times daily 10/20/20   Monica Calderon MD       REVIEW OF SYSTEMS    (2-9 systems for level 4, 10 or more for level 5)      Review of Systems   Constitutional: Negative for chills and fever. Malaise   HENT: Positive for ear pain and sinus pain. Negative for rhinorrhea and sore throat. Eyes: Negative for photophobia. Respiratory: Positive for shortness of breath. Negative for chest tightness. Cardiovascular: Positive for chest pain. Gastrointestinal: Negative for abdominal distention, anal bleeding, constipation, diarrhea, nausea and vomiting. Endocrine: Negative for polyuria. Genitourinary: Negative for difficulty urinating and flank pain. Musculoskeletal: Negative for arthralgias. Skin: Negative for rash. Neurological: Negative for weakness and headaches. PHYSICAL EXAM   (up to 7 for level 4, 8 or more for level 5)      INITIAL VITALS:   /86   Pulse 112   Temp 98.8 °F (37.1 °C) (Oral)   Resp 16   Ht 5' 6\" (1.676 m)   Wt 230 lb (104.3 kg)   LMP  (LMP Unknown)   SpO2 99%   BMI 37.12 kg/m²     Physical Exam  Constitutional:       General: She is not in acute distress. Appearance: She is not ill-appearing. HENT:      Head: Normocephalic. Right Ear: Tympanic membrane normal.      Left Ear: Tympanic membrane normal.      Nose: Nose normal. No congestion. Mouth/Throat:      Pharynx: No oropharyngeal exudate or posterior oropharyngeal erythema. Eyes:      General: No scleral icterus. Conjunctiva/sclera: Conjunctivae normal.      Pupils: Pupils are equal, round, and reactive to light. Cardiovascular:      Rate and Rhythm: Normal rate and regular rhythm. Heart sounds: No murmur heard. No friction rub. No gallop. Pulmonary:      Breath sounds: No wheezing, rhonchi or rales. Abdominal:      General: Abdomen is flat. There is no distension. Palpations: Abdomen is soft. Tenderness: There is no abdominal tenderness. There is no guarding. Musculoskeletal:      Cervical back: No rigidity.       Comments: Postsurgical wound dehiscence of right index finger, purulent discharge, exposed bone, limited range of motion of index finger in all planes. +2 radial pulse, brisk cap refill, sensation intact. Skin:     Coloration: Skin is not jaundiced. Findings: No rash. Neurological:      General: No focal deficit present. Mental Status: She is oriented to person, place, and time.          DIFFERENTIAL  DIAGNOSIS     PLAN (LABS / IMAGING / EKG):  Orders Placed This Encounter   Procedures    COVID-19, Rapid    XR HAND RIGHT (MIN 3 VIEWS)    CBC Auto Differential    C-REACTIVE PROTEIN    Sedimentation Rate    Troponin    Basic Metabolic Panel    Troponin    Diet NPO    Inpatient consult to Plastic Surgery    Pharmacy to Dose: Vancomycin    Inpatient consult to Infectious Diseases    Inpatient consult to Plastic Surgery    PATIENT STATUS (FROM ED OR OR/PROCEDURAL) Observation       MEDICATIONS ORDERED:  Orders Placed This Encounter   Medications    acetaminophen (TYLENOL) tablet 650 mg    piperacillin-tazobactam (ZOSYN) 3,375 mg in dextrose 5 % 50 mL IVPB (mini-bag)     Order Specific Question:   Antimicrobial Indications     Answer:   Skin and Soft Tissue Infection    morphine injection 4 mg    vancomycin (VANCOCIN) intermittent dosing (placeholder)     Order Specific Question:   Antimicrobial Indications     Answer:   Skin and Soft Tissue Infection    vancomycin (VANCOCIN) 1500 mg in dextrose 5 % 250 mL IVPB     Order Specific Question:   Antimicrobial Indications     Answer:   Skin and Soft Tissue Infection    DISCONTD: piperacillin-tazobactam (ZOSYN) 3,375 mg in dextrose 5 % 50 mL IVPB (mini-bag)     Order Specific Question:   Antimicrobial Indications     Answer:   Skin and Soft Tissue Infection    piperacillin-tazobactam (ZOSYN) 3,375 mg in dextrose 5 % 50 mL IVPB (mini-bag)     Order Specific Question:   Antimicrobial Indications     Answer:   Skin and Soft Tissue Infection    vancomycin (VANCOCIN) 1250 mg in dextrose 5 % 250 Ratio NOT REPORTED 9 - 20    Calcium 8.9 8.6 - 10.4 mg/dL    Sodium 135 135 - 144 mmol/L    Potassium 3.9 3.7 - 5.3 mmol/L    Chloride 99 98 - 107 mmol/L    CO2 20 20 - 31 mmol/L    Anion Gap 16 9 - 17 mmol/L    GFR Non-African American >60 >60 mL/min    GFR African American >60 >60 mL/min    GFR Comment          GFR Staging NOT REPORTED    Troponin   Result Value Ref Range    Troponin, High Sensitivity 17 (H) 0 - 14 ng/L    Troponin T NOT REPORTED <0.03 ng/mL    Troponin Interp NOT REPORTED        IMPRESSION: Acute inflammatory reaction    RADIOLOGY:  No results found. EKG Interpretation    Interpreted by myself    Rhythm: normal sinus   Rate: normal  Axis: normal  Ectopy: none  Conduction: normal  ST Segments: no acute change and normal  T Waves: normal  Q Waves: none    Clinical Impression: no acute changes    All EKG's are interpreted by the Emergency Department Physician who either signs or Co-signs this chart in the absence of a cardiologist.    EMERGENCY DEPARTMENT COURSE:  ED Course as of Oct 06 2008   Wed Oct 06, 2021   1430 Patient evaluated at bedside period. Patient's constellation of symptoms are consistent with Covid vaccine reaction. Low concern for acute coronary syndrome however given risk factors will get troponin level and pending value may trend. Will consult plastics to evaluate hand due to recent surgery on hand. [ZE]   [id number] with patient's hand surgeon. Patient will need to be admitted with plans for amputation of the right finger at some point.   We will start vancomycin and Zosyn and admit to obs    [ZE]   1749 Troponin(!):    Troponin, High Sensitivity 17(!)   Troponin T NOT REPORTED   Troponin Interp NOT REPORTED [ZE]   1833 COVID-19, Rapid [ZE]   1840 COVID-19, Rapid [ZE]      ED Course User Index  [ZE] Nate Viera,        PROCEDURES:  none    CONSULTS:  IP CONSULT TO PLASTIC SURGERY  PHARMACY TO DOSE VANCOMYCIN  IP CONSULT TO INFECTIOUS DISEASES  IP CONSULT TO PLASTIC SURGERY    CRITICAL CARE:  none    FINAL IMPRESSION      1. Adverse effect of COVID-19 vaccine    2. Open wound of right index finger       DISPOSITION / PLAN     DISPOSITION  -admit to medicine Intermed service      PATIENT REFERRED TO:  No follow-up provider specified.     DISCHARGE MEDICATIONS:  New Prescriptions    No medications on file       Tr Christainson DO  Emergency Medicine Resident    (Please note that portions of thisnote were completed with a voice recognition program.  Efforts were made to edit the dictations but occasionally words are mis-transcribed.)     Al Evans DO  Resident  10/06/21 2008

## 2021-10-06 NOTE — ED PROVIDER NOTES
I performed a history and physical examination of the patient and discussed management with the resident. I reviewed the residents note and agree with the documented findings and plan of care. Any areas of disagreement are noted on the chart. I was personally present for the key portions of any procedures. I have documented in the chart those procedures where I was not present during the key portions. I have reviewed the emergency nurses triage note. I agree with the chief complaint, past medical history, past surgical history, allergies, medications, social and family history as documented unless otherwise noted below. Documentation of the HPI, Physical Exam and Medical Decision Making performed by medical students or scribes is based on my personal performance of the HPI, PE and MDM. For Phys Assistant/ Nurse Practitioner cases/documentation I have personally evaluated this patient and have completed at least one if not all key elements of the E/M (history, physical exam, and MDM). I find the patient's history and physical exam are consistent with the NP/PA documentation. I agree with the care provided, treatment rendered, disposition and followup plan. Additional findings are as noted. Henri Elizabeth MD  Attending Emergency  Physician     this 81-year-old female presents to emergency department complaints of 2 issues. Problem #1 and is an open wound on the palmar aspect of the right index finger which has been treated with an incision and drainage of an abscess approximately 4 to 5 weeks ago by the plastic surgeons. She denies any fevers or chills. No purulence or discharge. She is complaining discomfort in the digit. Problem #2 is muscle aches and myalgias today after having received her Covid immunization yesterday. Denies any chest pain or difficulty breathing. No abdominal pain. No nausea or vomiting. Patient is right-hand dominant.   On examination the right index finger is a large widely opened jagged surgical wound on the palmar aspect of the digit from the distal metacarpal region all the way up to the DIP. Wound is notable for extensive granulation tissue. There is significant dehiscence of the wound along its entire course. There is no purulent discharge noted. Decreased sensation in the distal fingertips. No lymphangitis. Patient has extremely limited flexion of the digit secondary to discomfort. Lungs are clear to auscultation and percussion. Cardiac exam reveals an S1-S2, regular rate and rhythm without murmur, rub, or gallop. Abdomen is soft nondistended nontender. Bowel sounds are normal.  Impression postop wound dehiscence and delayed healing. Additional diagnosis is post vaccine ill myalgias. Patient was discussed with the plastic surgical service who are of the opinion that the affected finger will need to be amputated. Patient is being admitted in order to facilitate that. Ted Ballesteros MD  10/06/21 4631    Patient was signed out to  at the completion of my shift.        Ted Ballesteros MD  10/06/21 [id number]

## 2021-10-07 LAB
CULTURE: NORMAL
DIRECT EXAM: NORMAL
GLUCOSE BLD-MCNC: 182 MG/DL (ref 65–105)
GLUCOSE BLD-MCNC: 199 MG/DL (ref 65–105)
GLUCOSE BLD-MCNC: 215 MG/DL (ref 65–105)
GLUCOSE BLD-MCNC: 265 MG/DL (ref 65–105)
Lab: NORMAL
SPECIMEN DESCRIPTION: NORMAL

## 2021-10-07 PROCEDURE — 6360000002 HC RX W HCPCS: Performed by: INTERNAL MEDICINE

## 2021-10-07 PROCEDURE — 99219 PR INITIAL OBSERVATION CARE/DAY 50 MINUTES: CPT | Performed by: FAMILY MEDICINE

## 2021-10-07 PROCEDURE — 87205 SMEAR GRAM STAIN: CPT

## 2021-10-07 PROCEDURE — 6370000000 HC RX 637 (ALT 250 FOR IP): Performed by: STUDENT IN AN ORGANIZED HEALTH CARE EDUCATION/TRAINING PROGRAM

## 2021-10-07 PROCEDURE — 6360000002 HC RX W HCPCS: Performed by: STUDENT IN AN ORGANIZED HEALTH CARE EDUCATION/TRAINING PROGRAM

## 2021-10-07 PROCEDURE — 2500000003 HC RX 250 WO HCPCS: Performed by: STUDENT IN AN ORGANIZED HEALTH CARE EDUCATION/TRAINING PROGRAM

## 2021-10-07 PROCEDURE — 99254 IP/OBS CNSLTJ NEW/EST MOD 60: CPT | Performed by: INTERNAL MEDICINE

## 2021-10-07 PROCEDURE — G0378 HOSPITAL OBSERVATION PER HR: HCPCS

## 2021-10-07 PROCEDURE — 96376 TX/PRO/DX INJ SAME DRUG ADON: CPT

## 2021-10-07 PROCEDURE — 82947 ASSAY GLUCOSE BLOOD QUANT: CPT

## 2021-10-07 PROCEDURE — 93005 ELECTROCARDIOGRAM TRACING: CPT | Performed by: INTERNAL MEDICINE

## 2021-10-07 PROCEDURE — 96375 TX/PRO/DX INJ NEW DRUG ADDON: CPT

## 2021-10-07 PROCEDURE — 96372 THER/PROPH/DIAG INJ SC/IM: CPT

## 2021-10-07 PROCEDURE — 6360000002 HC RX W HCPCS: Performed by: HEALTH CARE PROVIDER

## 2021-10-07 PROCEDURE — 87070 CULTURE OTHR SPECIMN AEROBIC: CPT

## 2021-10-07 PROCEDURE — 76937 US GUIDE VASCULAR ACCESS: CPT

## 2021-10-07 PROCEDURE — 2580000003 HC RX 258: Performed by: INTERNAL MEDICINE

## 2021-10-07 PROCEDURE — 96366 THER/PROPH/DIAG IV INF ADDON: CPT

## 2021-10-07 PROCEDURE — 87077 CULTURE AEROBIC IDENTIFY: CPT

## 2021-10-07 PROCEDURE — 2580000003 HC RX 258: Performed by: STUDENT IN AN ORGANIZED HEALTH CARE EDUCATION/TRAINING PROGRAM

## 2021-10-07 PROCEDURE — 6370000000 HC RX 637 (ALT 250 FOR IP): Performed by: INTERNAL MEDICINE

## 2021-10-07 PROCEDURE — 87075 CULTR BACTERIA EXCEPT BLOOD: CPT

## 2021-10-07 PROCEDURE — 86403 PARTICLE AGGLUT ANTBDY SCRN: CPT

## 2021-10-07 PROCEDURE — 87186 SC STD MICRODIL/AGAR DIL: CPT

## 2021-10-07 RX ORDER — INSULIN GLARGINE 100 [IU]/ML
10 INJECTION, SOLUTION SUBCUTANEOUS 2 TIMES DAILY
Status: DISCONTINUED | OUTPATIENT
Start: 2021-10-07 | End: 2021-10-09

## 2021-10-07 RX ORDER — MORPHINE SULFATE 2 MG/ML
1 INJECTION, SOLUTION INTRAMUSCULAR; INTRAVENOUS
Status: DISCONTINUED | OUTPATIENT
Start: 2021-10-07 | End: 2021-10-09

## 2021-10-07 RX ADMIN — MORPHINE SULFATE 1 MG: 2 INJECTION, SOLUTION INTRAMUSCULAR; INTRAVENOUS at 10:23

## 2021-10-07 RX ADMIN — TRAZODONE HYDROCHLORIDE 200 MG: 100 TABLET ORAL at 01:27

## 2021-10-07 RX ADMIN — VENLAFAXINE HYDROCHLORIDE 150 MG: 150 CAPSULE, EXTENDED RELEASE ORAL at 10:23

## 2021-10-07 RX ADMIN — MORPHINE SULFATE 4 MG: 4 INJECTION INTRAVENOUS at 01:27

## 2021-10-07 RX ADMIN — SODIUM CHLORIDE: 9 INJECTION, SOLUTION INTRAVENOUS at 04:52

## 2021-10-07 RX ADMIN — FAMOTIDINE 20 MG: 10 INJECTION INTRAVENOUS at 21:10

## 2021-10-07 RX ADMIN — SODIUM CHLORIDE, PRESERVATIVE FREE 10 ML: 5 INJECTION INTRAVENOUS at 10:24

## 2021-10-07 RX ADMIN — MORPHINE SULFATE 1 MG: 2 INJECTION, SOLUTION INTRAMUSCULAR; INTRAVENOUS at 21:11

## 2021-10-07 RX ADMIN — CEFTAROLINE FOSAMIL 600 MG: 600 POWDER, FOR SOLUTION INTRAVENOUS at 11:12

## 2021-10-07 RX ADMIN — INSULIN GLARGINE 10 UNITS: 100 INJECTION, SOLUTION SUBCUTANEOUS at 21:17

## 2021-10-07 RX ADMIN — TRAZODONE HYDROCHLORIDE 200 MG: 100 TABLET ORAL at 21:20

## 2021-10-07 RX ADMIN — INSULIN LISPRO 3 UNITS: 100 INJECTION, SOLUTION INTRAVENOUS; SUBCUTANEOUS at 21:18

## 2021-10-07 RX ADMIN — SODIUM CHLORIDE: 9 INJECTION, SOLUTION INTRAVENOUS at 10:24

## 2021-10-07 RX ADMIN — FAMOTIDINE 20 MG: 10 INJECTION INTRAVENOUS at 10:23

## 2021-10-07 RX ADMIN — CEFTAROLINE FOSAMIL 600 MG: 600 POWDER, FOR SOLUTION INTRAVENOUS at 21:11

## 2021-10-07 RX ADMIN — MORPHINE SULFATE 1 MG: 2 INJECTION, SOLUTION INTRAMUSCULAR; INTRAVENOUS at 16:20

## 2021-10-07 RX ADMIN — FLUCONAZOLE 400 MG: 200 TABLET ORAL at 21:11

## 2021-10-07 RX ADMIN — INSULIN LISPRO 4 UNITS: 100 INJECTION, SOLUTION INTRAVENOUS; SUBCUTANEOUS at 16:41

## 2021-10-07 RX ADMIN — INSULIN GLARGINE 10 UNITS: 100 INJECTION, SOLUTION SUBCUTANEOUS at 16:48

## 2021-10-07 ASSESSMENT — PAIN SCALES - GENERAL
PAINLEVEL_OUTOF10: 8
PAINLEVEL_OUTOF10: 8
PAINLEVEL_OUTOF10: 10
PAINLEVEL_OUTOF10: 10
PAINLEVEL_OUTOF10: 3
PAINLEVEL_OUTOF10: 2
PAINLEVEL_OUTOF10: 10

## 2021-10-07 ASSESSMENT — PAIN DESCRIPTION - PROGRESSION
CLINICAL_PROGRESSION: GRADUALLY WORSENING

## 2021-10-07 NOTE — CARE COORDINATION
SBIRT- completed and screenings were negative          Alcohol Screening and Brief Intervention        No results for input(s): ALC in the last 72 hours. Alcohol Pre-screening     (WOMEN ONLY) How many times in the past year have you had 4 or more drinks in a day?: None    Alcohol Screening Audit       Drug Pre-Screening   How many times in the past year have you used a recreational drug or used a prescription medication for nonmedical reasons?: None    Drug Screening DAST       Mood Pre-Screening (PHQ-2)  During the past two weeks, have you been bothered by little interest or pleasure in doing things?: No  During the past two weeks, have you been bothered by feeling down, depressed, or hopeless?: No    Mood Pre-Screening (PHQ-9)         I have interviewed Michelle Martins, [id number] regarding  Her alcohol consumption/drug use and risk for excessive use. Screenings were negative. Patient  N/A intervention at this time.      Deferred []    Completed on: 10/7/2021   PATRICIA MOJICA

## 2021-10-07 NOTE — PLAN OF CARE
Problem: Falls - Risk of:  Goal: Will remain free from falls  Description: Will remain free from falls  Outcome: Ongoing  Goal: Absence of physical injury  Description: Absence of physical injury  Outcome: Ongoing     Problem: Sensory:  Goal: Ability to identify factors that increase the pain will improve  Description: Ability to identify factors that increase the pain will improve  Outcome: Ongoing  Goal: Ability to demonstrate ways to enhance comfort will improve  Description: Ability to demonstrate ways to enhance comfort will improve  Outcome: Ongoing  Goal: General experience of comfort will improve  Description: General experience of comfort will improve  Outcome: Ongoing     Problem: Skin Integrity:  Goal: Will show no infection signs and symptoms  Description: Will show no infection signs and symptoms  Outcome: Ongoing  Goal: Absence of new skin breakdown  Description: Absence of new skin breakdown  Outcome: Ongoing  Goal: Risk for impaired skin integrity will decrease  Description: Risk for impaired skin integrity will decrease  Outcome: Ongoing  Goal: Demonstration of wound healing without infection will improve  Description: Demonstration of wound healing without infection will improve  Outcome: Ongoing  Goal: Complications related to intravenous access or infusion will be avoided or minimized  Description: Complications related to intravenous access or infusion will be avoided or minimized  Outcome: Ongoing     Problem:  Activity:  Goal: Risk for activity intolerance will decrease  Description: Risk for activity intolerance will decrease  Outcome: Ongoing     Problem: Coping:  Goal: Ability to adjust to condition or change in health will improve  Description: Ability to adjust to condition or change in health will improve  Outcome: Ongoing     Problem: Fluid Volume:  Goal: Ability to maintain a balanced intake and output will improve  Description: Ability to maintain a balanced intake and output will improve  Outcome: Ongoing     Problem: Health Behavior:  Goal: Ability to identify and utilize available resources and services will improve  Description: Ability to identify and utilize available resources and services will improve  Outcome: Ongoing  Goal: Ability to manage health-related needs will improve  Description: Ability to manage health-related needs will improve  Outcome: Ongoing     Problem: Metabolic:  Goal: Ability to maintain appropriate glucose levels will improve  Description: Ability to maintain appropriate glucose levels will improve  Outcome: Ongoing     Problem: Nutritional:  Goal: Maintenance of adequate nutrition will improve  Description: Maintenance of adequate nutrition will improve  Outcome: Ongoing  Goal: Progress toward achieving an optimal weight will improve  Description: Progress toward achieving an optimal weight will improve  Outcome: Ongoing  Goal: Nutritional status will improve  Description: Nutritional status will improve  Outcome: Ongoing     Problem: Physical Regulation:  Goal: Complications related to the disease process, condition or treatment will be avoided or minimized  Description: Complications related to the disease process, condition or treatment will be avoided or minimized  Outcome: Ongoing  Goal: Diagnostic test results will improve  Description: Diagnostic test results will improve  Outcome: Ongoing  Goal: Will remain free from infection  Description: Will remain free from infection  Outcome: Ongoing  Goal: Ability to maintain vital signs within normal range will improve  Description: Ability to maintain vital signs within normal range will improve  Outcome: Ongoing     Problem: Tissue Perfusion:  Goal: Adequacy of tissue perfusion will improve  Description: Adequacy of tissue perfusion will improve  Outcome: Ongoing     Problem: Respiratory:  Goal: Ability to maintain normal respiratory secretions will improve  Description: Ability to maintain normal respiratory secretions will improve  Outcome: Ongoing

## 2021-10-07 NOTE — ED NOTES
Pt ambulated to bathroom and back by self with walker, steady gait. Voided x1.       Mayur Duval, VINOD  10/07/21 [id number]

## 2021-10-07 NOTE — CONSULTS
[facility] -   Infectious diseases evaluation  admission date 10/6/2021    reason for consultation:   Right hand infection    Impression :   Current:  · Right second finger infected soft tissue then osteomyelitis  · Right hand soft tissue infection  · Diabetes on insulin  · SLE    Other:  ·   Discussion / summary of stay / plan of care   ·   Recommendations   · Continue high-dose Diflucan 400 mg a day p.o. · Start the patient on ceftaroline to spare the kidneys and diabetic  · Await amputation by orthopedic in the morning. Infection Control Recommendations   · [place] Precautions    Antimicrobial Stewardship Recommendations   · Simplification of therapy  · Targeted therapy      Coordination ofOutpatient Care:   · Estimated Length of IV antimicrobials:  · Patient will need Midline / picc Catheter Insertion:   · Patient will need SNF:  · Patient will need outpatient wound care:     History of Present Illness:   Initial history:  Jayda Askew is a 49y.o.-year-old female very familiar to me who was admitted earlier this month to the hospital with a cellulitis and an abscess over the right hand mostly the right second finger, cultures grew gram-positive cocci as well as Candida and the patient was discharged on Diflucan and amoxicillin. The target was soft tissue infection and tendinitis, there was a concern of possible osteomyelitis. The patient demanded amputation before leaving. Ortho had followed her and planned to see her again in amputate the finger if it does not respond to antibiotic therapy. She comes back with the finger still hurting her, has not improved, and was planned to go for amputation of the morning. Her finger is very stiff the soft tissues are hard, she is not able to bend it  Picture as below.         Cultures from the finger 9/23 showing staph epidermides 2 strains methicillin-resistant and staph lugdunensis methicillin resistant    9/14 culture showed Candida glabrata    Interval changes  10/6/2021   Patient Vitals for the past 8 hrs:   BP Temp Temp src Pulse Resp SpO2 Height Weight   10/06/21 1327 133/86 98.8 °F (37.1 °C) Oral 112 16 99 % 5' 6\" (1.676 m) 230 lb (104.3 kg)       Summary of relevant labs:  Labs:    Micro:    Imaging:      I have personally reviewed the past medical history, past surgical history, medications, social history, and family history, and I haveupdated the database accordingly. Allergies:   Bactrim [sulfamethoxazole-trimethoprim] and Adhesive tape     Review of Systems:     Review of Systems   Constitutional: Negative for activity change. HENT: Negative for congestion. Eyes: Negative for discharge. Respiratory: Negative for apnea. Cardiovascular: Negative for chest pain. Gastrointestinal: Negative for abdominal distention. Endocrine: Negative for cold intolerance. Genitourinary: Negative for dysuria. Musculoskeletal: Negative for arthralgias. Skin: Positive for color change and wound. Allergic/Immunologic: Positive for immunocompromised state. Neurological: Negative for dizziness. Hematological: Negative for adenopathy. Psychiatric/Behavioral: Negative for agitation. Physical Examination :       Physical Exam  Constitutional:       Appearance: Normal appearance. She is normal weight. She is not ill-appearing. HENT:      Head: Normocephalic and atraumatic. Nose: Nose normal.      Mouth/Throat:      Mouth: Mucous membranes are moist.   Eyes:      General: No scleral icterus. Conjunctiva/sclera: Conjunctivae normal.   Cardiovascular:      Rate and Rhythm: Normal rate and regular rhythm. Heart sounds: Normal heart sounds. No murmur heard. Pulmonary:      Effort: No respiratory distress. Breath sounds: Normal breath sounds. Abdominal:      General: There is no distension. Palpations: There is no mass.    Genitourinary:     Comments: No subhash  Musculoskeletal:         General: Swelling and deformity present. Cervical back: Neck supple. No rigidity. Skin:     General: Skin is warm. Coloration: Skin is not jaundiced. Findings: No bruising. Neurological:      General: No focal deficit present. Mental Status: She is oriented to person, place, and time. Sensory: No sensory deficit. Psychiatric:         Mood and Affect: Mood normal.         Thought Content:  Thought content normal.         Past Medical History:     Past Medical History:   Diagnosis Date    Acid reflux 5/29/2017    Acute cystitis with hematuria 10/11/2016    Acute non-recurrent maxillary sinusitis 11/28/2017    Asthma     Bipolar 1 disorder ([address] 1/4/2018    Bipolar disorder, mixed ([address]     BMI 34.0-34.9,adult 11/28/2017    Cannabis use disorder, severe, dependence ([address] 12/19/2017    Cerebrovascular accident (CVA) ([address] 6/14/2017    Chest pain 11/5/2016    Chronic renal insufficiency     Cocaine abuse ([address] 1/5/2018    COVID-19 virus RNA test result unknown     DDD (degenerative disc disease), cervical     Diabetes mellitus ([address]     Dizziness 6/13/2017    Fibromyalgia     History of stroke 9/9/2017    Homicidal ideation 11/6/2017    Hyperglycemia     Hypertension     Hypotension 1/18/2019    IDDM (insulin dependent diabetes mellitus) 12/21/2015    Lupus ([address]     Migraine     Neuropathy     Neuropathy     Polysubstance abuse ([address] 9/17/2017    Post traumatic stress disorder (PTSD)     Posttraumatic stress disorder 5/29/2017    Recurrent depression ([address] 5/29/2017    Recurrent major depressive disorder, in partial remission ([address] 11/28/2017    Screening mammogram, encounter for 11/28/2017    Severe recurrent major depression with psychotic features ([address] 12/19/2017    Severe recurrent major depression without psychotic features ([address] 12/19/2017    Stroke (cerebrum) ([address] 6/14/2017    Stroke ([address]     per chart notes    Suicidal ideation     Suicidal intent 3/10/2017    Vitamin D deficiency 11/28/2017    White matter changes 6/13/2017       Past Surgical  History:     Past Surgical History:   Procedure Laterality Date    ABDOMEN SURGERY      drain tube    ABSCESS DRAINAGE      right buttock    CATARACT REMOVAL WITH IMPLANT Bilateral     CHEST TUBE INSERTION      FINGER AMPUTATION Left 03/13/2021    LEFT RING FINER AMPUTATION performed by Michelle Perez MD at [address] Right 09/15/2021     I&D INDEX FINGER     HAND SURGERY Right 09/14/2021    I&D INDEX FINGER performed by Michelle Perez MD at [address] Right 9/15/2021    I&D INDEX FINGER performed by Michelle Perez MD at [address] LAS Bilateral        Medications:      vancomycin (VANCOCIN) intermittent dosing (placeholder)   Other RX Placeholder    piperacillin-tazobactam  3,375 mg IntraVENous Q8H    [START ON 10/7/2021] vancomycin  1,250 mg IntraVENous Q12H    [START ON 10/7/2021] insulin lispro  0-12 Units SubCUTAneous TID WC    insulin lispro  0-6 Units SubCUTAneous Nightly       Social History:     Social History     Socioeconomic History    Marital status: Legally      Spouse name: Not on file    Number of children: Not on file    Years of education: Not on file    Highest education level: Not on file   Occupational History    Not on file   Tobacco Use    Smoking status: Never Smoker    Smokeless tobacco: Never Used   Vaping Use    Vaping Use: Never used   Substance and Sexual Activity    Alcohol use: Not Currently    Drug use: Yes     Types: Marijuana, Cocaine     Comment: + Cocaine 2/2021 also, see Care Everywhere UDS    Sexual activity: Not Currently     Partners: Male   Other Topics Concern    Not on file   Social History Narrative    Not on file     Social Determinants of Health     Financial Resource Strain: High Risk    Difficulty of Paying Living Expenses: Hard   Food Insecurity: Food note is created with the assistance of a speech recognition program.  While intending to generate adocument that actually reflects the content of the visit, the document can still have some errors including those of syntax and sound a like substitutions which may escape proof reading. It such instances, actual meaningcan be extrapolated by contextual diversion.     Luana Edwards MD  Office: [phone]  Perfect serve / office [phone]

## 2021-10-07 NOTE — ED NOTES
Bed: 30  Expected date:   Expected time:   Means of arrival:   Comments:  [id number], VINOD  10/06/21 1620

## 2021-10-07 NOTE — H&P
[facility]  History & Physical Examination Note              Date:   10/7/2021  Patient name:  Andie Juarez  Date of admission:  10/6/2021  2:33 PM  MRN:   [id number]  YOB: 1965    CHIEF COMPLAINT:       Chief Complaint   Patient presents with    Chest Pain     started this morning, states she believes she is allergic to the covid vacc    Finger Pain     right pointer finger, previous surgery       History Obtained From:  Patient and chart review. HPI:     The patient is a 49 y.o.  female with history of diabetes mellitus type 2, bipolar depression and asthma. Patient presented to the ED with an open wound on right index finger. Patient had an incision and drainage on 9/14 for an abscess on her finger which later dehisced. Patient has had purulent drainage of the wound and is experiencing significant pain. Patient has been seen by orthopedic surgery and plan for surgery today for revision of I&D with possible amputation.      Patient also has headaches and myalgias which started after receiving COVID vaccine on 10/05/2021    PAST MEDICAL HISTORY:        has a past medical history of Acid reflux, Acute cystitis with hematuria, Acute non-recurrent maxillary sinusitis, Asthma, Bipolar 1 disorder ([address] Bipolar disorder, mixed ([address] BMI 34.0-34.9,adult, Cannabis use disorder, severe, dependence ([address] Cerebrovascular accident (CVA) ([address] Chest pain, Chronic renal insufficiency, Cocaine abuse ([address] COVID-19 virus RNA test result unknown, DDD (degenerative disc disease), cervical, Diabetes mellitus ([address] Dizziness, Fibromyalgia, History of stroke, Homicidal ideation, Hyperglycemia, Hypertension, Hypotension, IDDM (insulin dependent diabetes mellitus), Lupus ([address] Migraine, Neuropathy, Neuropathy, Polysubstance abuse ([address] Post traumatic stress disorder (PTSD), Posttraumatic stress disorder, Recurrent depression ([address] Recurrent major depressive disorder, in partial remission ([facility] Screening mammogram, encounter for, Severe recurrent major depression with psychotic features ([facility] Severe recurrent major depression without psychotic features ([facility] Stroke (cerebrum) ([facility] Stroke ([facility] Suicidal ideation, Suicidal intent, Vitamin D deficiency, and White matter changes. PAST SURGICAL HISTORY:      has a past surgical history that includes Abscess Drainage; chest tube insertion; Abdomen surgery; Cataract removal with implant (Bilateral); LASIK (Bilateral); Finger amputation (Left, 03/13/2021); Hand surgery (Right, 09/14/2021); Finger surgery (Right, 09/15/2021); and Hand surgery (Right, 9/15/2021). FAMILY HISTORY:     family history includes Diabetes in her brother, father, mother, and sister; No Known Problems in her sister; Other in her son; Stroke in her mother. HOME MEDICATIONS:     Prior to Admission medications    Medication Sig Start Date End Date Taking? Authorizing Provider   amoxicillin (AMOXIL) 500 MG capsule Take 2 capsules by mouth 2 times daily for 21 days 9/24/21 10/15/21  Maylin Michel MD   fluconazole (DIFLUCAN) 200 MG tablet Take 2 tablets by mouth daily for 21 days Till 10/11/21 - 21 days  Watch LFT 2 x per week 9/20/21 10/11/21  Maylin Hollis MD   cetirizine (ZYRTEC) 10 MG tablet Take 1 tablet by mouth daily 8/9/21   Jose Macedo MD   insulin glargine (LANTUS SOLOSTAR) 100 UNIT/ML injection pen Inject 20 Units into the skin 2 times daily 8/9/21   Jose Macedo MD   pregabalin (LYRICA) 75 MG capsule Take 1 capsule by mouth 2 times daily for 30 days. Patient taking differently: Take 100 mg by mouth 2 times daily.   8/9/21 9/8/21  Jose Macedo MD   pantoprazole (PROTONIX) 20 MG tablet Take 1 tablet by mouth 2 times daily (before meals) 7/22/21   Uriel Bowen MD   metFORMIN (GLUCOPHAGE) 1000 MG tablet Take 1 tablet by mouth 2 times daily (with meals) 7/22/21   Uriel Bowen MD   venlafaxine (EFFEXOR XR) 150 MG extended release capsule Take 1 capsule by mouth daily (with breakfast) 7/22/21   Odalis Sutton MD   dicyclomine (BENTYL) 10 MG capsule Take 1 capsule by mouth 4 times daily 7/12/21   Carolyn Schwarz MD   Insulin Pen Needle ([employer] PEN NEEDLES 31G) 31G X 8 MM MISC 1 each by Does not apply route daily 1/4/21   Odalis Sutton MD    MG capsule TAKE 1 CAPSULE BY MOUTH TWICE DAILY 12/9/20   Odalis Sutton MD   traZODone (DESYREL) 150 MG tablet Take 1 tablet by mouth nightly  Patient taking differently: Take 200 mg by mouth nightly  11/18/20   Odalis Sutton MD   FREESTYLE LITE strip 1 each by Does not apply route 3 times daily 11/11/20   Odalis Sutton MD   FreeStyle Lancets MISC 1 each by Does not apply route 3 times daily 11/11/20   Odalis Sutton MD   albuterol sulfate  (90 Base) MCG/ACT inhaler Inhale 2 puffs into the lungs every 4 hours as needed for Wheezing 10/20/20 9/22/21  Odalis Sutton MD   FLOVENT  MCG/ACT inhaler Inhale 2 puffs into the lungs 2 times daily  Patient not taking: Reported on 9/22/2021 10/20/20   Odalis Sutton MD   budesonide-formoterol (SYMBICORT) 80-4.5 MCG/ACT AERO Inhale 2 puffs into the lungs 2 times daily 10/20/20   Odalis Sutton MD       ALLERGIES:      Bactrim [sulfamethoxazole-trimethoprim] and Adhesive tape    SOCIAL HISTORY:      reports that she has never smoked. She has never used smokeless tobacco. She reports previous alcohol use. She reports current drug use. Drugs: Marijuana and Cocaine. REVIEW OF SYSTEMS:     CONSTITUTIONAL:  no fevers  HEENT: positive for headaches  RESPIRATORY:negative for dyspnea, no wheezing.   CARDIOVASCULAR: negative for chest pain, no palpitations, no fatigue, no edema   GASTROINTESTINAL: no nausea, no vomiting, no change in bowel habits, no abdominal pain    INTEGUMENT: negative for rash  HEMATOLOGIC/LYMPHATIC: negative for swelling/edema   ALLERGIC/IMMUNOLOGIC: negative for urticaria   ENDOCRINE: no polydipsia, no polyuria, no hot or cold intolerance  MUSCULOSKELETAL: no joint pains, has myalgias   BEHAVIOR/PSYCH: negative      PHYSICAL EXAM:     Vitals:    10/07/21 0102 10/07/21 0146 10/07/21 0315 10/07/21 0954   BP: (!) 114/58 112/65 103/60 106/69   Pulse: 88 82 80 80   Resp:  12 15 16   Temp:    97.9 °F (36.6 °C)   TempSrc:    Oral   SpO2:       Weight:       Height:           No intake or output data in the 24 hours ending 10/07/21 1026    General Appearance  Alert , awake , oriented x 3  HEENT - Head is normocephalic. Lungs - Bilateral equal air entry , no wheezes, rales or rhonchi, aeration good  Cardiovascular - Heart sounds are normal.  Regular rhythm, normal rate without murmur, gallop or rub.   Abdomen - Soft, nontender, nondistended, no masses or organomegaly  Skin - No bruising or bleeding on exposed skin area  Extremities - No cyanosis, clubbing or edema, Right index finger has large open wound and swelling  Psych - normal affect     DIAGNOSTICS:      Laboratory Testing:    Recent Results (from the past 24 hour(s))   EKG 12 lead    Collection Time: 10/06/21  1:30 PM   Result Value Ref Range    Ventricular Rate 82 BPM    Atrial Rate 82 BPM    P-R Interval 160 ms    QRS Duration 92 ms    Q-T Interval 394 ms    QTc Calculation (Bazett) 460 ms    P Axis 57 degrees    R Axis 2 degrees    T Axis 25 degrees   CBC Auto Differential    Collection Time: 10/06/21  3:18 PM   Result Value Ref Range    WBC 5.5 3.5 - 11.3 k/uL    RBC 2.99 (L) 3.95 - 5.11 m/uL    Hemoglobin 9.7 (L) 11.9 - 15.1 g/dL    Hematocrit 30.5 (L) 36.3 - 47.1 %    .0 82.6 - 102.9 fL    MCH 32.4 25.2 - 33.5 pg    MCHC 31.8 28.4 - 34.8 g/dL    RDW 13.3 11.8 - 14.4 %    Platelets 216 320 - 998 k/uL    MPV 10.2 8.1 - 13.5 fL    NRBC Automated 0.0 0.0 per 100 WBC    Differential Type NOT REPORTED     Seg Neutrophils 67 (H) 36 - 65 %    Lymphocytes 20 (L) 24 - 43 %    Monocytes 10 3 - 12 %    Eosinophils % 3 1 - 4 %    Basophils 0 0 - 2 % Immature Granulocytes 0 0 %    Segs Absolute 3.59 1.50 - 8.10 k/uL    Absolute Lymph # 1.11 1.10 - 3.70 k/uL    Absolute Mono # 0.57 0.10 - 1.20 k/uL    Absolute Eos # 0.16 0.00 - 0.44 k/uL    Basophils Absolute <0.03 0.00 - 0.20 k/uL    Absolute Immature Granulocyte <0.03 0.00 - 0.30 k/uL    WBC Morphology NOT REPORTED     RBC Morphology NOT REPORTED     Platelet Estimate NOT REPORTED    C-REACTIVE PROTEIN    Collection Time: 10/06/21  3:18 PM   Result Value Ref Range    CRP 34.1 (H) 0.0 - 5.0 mg/L   Sedimentation Rate    Collection Time: 10/06/21  3:18 PM   Result Value Ref Range    Sed Rate 122 (H) 0 - 30 mm   Troponin    Collection Time: 10/06/21  3:18 PM   Result Value Ref Range    Troponin, High Sensitivity 18 (H) 0 - 14 ng/L    Troponin T NOT REPORTED <0.03 ng/mL    Troponin Interp NOT REPORTED    Basic Metabolic Panel    Collection Time: 10/06/21  3:18 PM   Result Value Ref Range    Glucose 286 (H) 70 - 99 mg/dL    BUN 12 6 - 20 mg/dL    CREATININE 0.68 0.50 - 0.90 mg/dL    Bun/Cre Ratio NOT REPORTED 9 - 20    Calcium 8.9 8.6 - 10.4 mg/dL    Sodium 135 135 - 144 mmol/L    Potassium 3.9 3.7 - 5.3 mmol/L    Chloride 99 98 - 107 mmol/L    CO2 20 20 - 31 mmol/L    Anion Gap 16 9 - 17 mmol/L    GFR Non-African American >60 >60 mL/min    GFR African American >60 >60 mL/min    GFR Comment          GFR Staging NOT REPORTED    Troponin    Collection Time: 10/06/21  5:18 PM   Result Value Ref Range    Troponin, High Sensitivity 17 (H) 0 - 14 ng/L    Troponin T NOT REPORTED <0.03 ng/mL    Troponin Interp NOT REPORTED    COVID-19, Rapid    Collection Time: 10/06/21  7:00 PM    Specimen: Nasopharyngeal Swab   Result Value Ref Range    Specimen Description . NASOPHARYNGEAL SWAB     SARS-CoV-2, Rapid Not Detected Not Detected   POC Glucose Fingerstick    Collection Time: 10/06/21  8:32 PM   Result Value Ref Range    POC Glucose 265 (H) 65 - 105 mg/dL   POCT glucose    Collection Time: 10/06/21  9:50 PM   Result Value Ref Range    Glucose 182 mg/dL    QC OK? okay    POC Glucose Fingerstick    Collection Time: 10/06/21  9:57 PM   Result Value Ref Range    POC Glucose 182 (H) 65 - 105 mg/dL   POC Glucose Fingerstick    Collection Time: 10/07/21  9:57 AM   Result Value Ref Range    POC Glucose 182 (H) 65 - 105 mg/dL         Imaging/[employer]agonst[employer]:  XR HAND RIGHT (MIN 3 VIEWS)    Result Date: 10/6/2021  EXAMINATION: THREE XRAY VIEWS OF THE RIGHT HAND 10/6/2021 3:09 pm COMPARISON: 09/14/2021 HISTORY: ORDERING SYSTEM PROVIDED HISTORY: right index wound and possibel fracture dip TECHNOLOGIST PROVIDED HISTORY: right index wound and possibel fracture dip Reason for Exam: hx of infection to index, unable to separate fingers for lateral Acuity: Unknown Type of Exam: Unknown FINDINGS: Suspected nondisplaced fracture of the middle phalanx of the index finger. No dislocation. Mild osteoarthritis. Bony mineralization is within normal limits. Suspected nondisplaced fracture of the middle phalanx of the index finger. XR HAND RIGHT (MIN 3 VIEWS)    Result Date: 9/14/2021  EXAMINATION: THREE XRAY VIEWS OF THE RIGHT HAND 9/14/2021 6:05 am COMPARISON: None. HISTORY: ORDERING SYSTEM PROVIDED HISTORY: R index finger swelling with discolouration TECHNOLOGIST PROVIDED HISTORY: R index finger swelling with discolouration FINDINGS: No fracture, dislocation or joint abnormality is identified. Bone density is normal.  Marked soft tissue swelling is present throughout the 2nd finger and over the dorsum of the hand. At the volar aspect of the 3rd or 4th middle phalanx at the proximal metadiaphyseal junction region, there is a bony defect likely related to old trauma. Marked soft tissue swelling involving the 2nd finger and dorsum of the hand. No acute osseous abnormality evident.      CT HEAD WO CONTRAST    Result Date: 9/15/2021  EXAMINATION: CT OF THE HEAD WITHOUT CONTRAST  9/15/2021 4:18 pm TECHNIQUE: CT of the head was performed without the administration of intravenous contrast. Dose modulation, iterative reconstruction, and/or weight based adjustment of the mA/kV was utilized to reduce the radiation dose to as low as reasonably achievable. COMPARISON: Head CT 07/07/2020 HISTORY: ORDERING SYSTEM PROVIDED HISTORY: mechanical fall TECHNOLOGIST PROVIDED HISTORY: mechanical fall Is the patient pregnant?->No Reason for Exam: mechanical fall FINDINGS: BRAIN/VENTRICLES: There is no acute intracranial hemorrhage, mass effect or midline shift. No abnormal extra-axial fluid collection. The gray-white differentiation is maintained without evidence of an acute infarct. There is prominence of the ventricles and sulci due to global parenchymal volume loss. There are nonspecific areas of hypoattenuation within the periventricular and subcortical white matter, which likely represent chronic microvascular ischemic change. ORBITS: The visualized portion of the orbits demonstrate no acute abnormality. SINUSES: The visualized paranasal sinuses and mastoid air cells demonstrate no acute abnormality. SOFT TISSUES/SKULL: No acute abnormality of the visualized skull or soft tissues. Vascular calcifications are noted reflecting calcific atherosclerosis. 1. No acute intracranial abnormality. 2.  White matter hypoattenuation described is typical of microvascular ischemic disease or as sequela of dysmyelinating/demyelinating processes. 3.  Vascular calcifications are noted reflecting calcific atherosclerosis. US RENAL COMPLETE    Result Date: 9/17/2021  EXAMINATION: RETROPERITONEAL ULTRASOUND OF THE KIDNEYS AND URINARY BLADDER 9/17/2021 COMPARISON: None HISTORY: ORDERING SYSTEM PROVIDED HISTORY: MARTIN TECHNOLOGIST PROVIDED HISTORY: MARTIN FINDINGS: Kidneys: The right kidney measures 11.6 cm in length and the left kidney measures 12.1 cm in length. Kidneys demonstrate normal cortical echogenicity. No evidence of hydronephrosis or intrarenal stones. Bladder:  The urinary bladder is grossly unremarkable with a prevoid volume of 12 mL. The ureteral jets were not visualized. There is cholelithiasis. Unremarkable ultrasound of the kidneys and urinary bladder. Cholelithiasis. XR CHEST PORTABLE    Result Date: 9/15/2021  EXAMINATION: ONE XRAY VIEW OF THE CHEST 9/15/2021 5:52 am COMPARISON: February 7, 2021 chest examination HISTORY: ORDERING SYSTEM PROVIDED HISTORY: rule out pneumonia TECHNOLOGIST PROVIDED HISTORY: rule out pneumonia Reason for Exam: port upright FINDINGS: Stable normal cardiopericardial silhouette Calcified granuloma right lung base. Otherwise clear lungs. Right hilar calcification. Now significant mediastinal or pleural findings     Remote granulomatous disease No acute cardiopulmonary findings         ASSESSMENT:       Active Problems:    Open wound of right index finger  Resolved Problems:    * No resolved hospital problems. *      PLAN:     Patient status: Admit the patient as Inpatient in the Med/Surge unit    Wound Dehiscence of right index finger  - Morphine for pain management   - Orthopedic surgery on board   - Planning for surgery today with repeat I&D and possible amputation    - On Ceftaroline    - NPO    Bipolar depression   - Continue home Effexor     Diabetes mellitus, type 2   - Medium dose sliding scale   - Holding Lantus until after surgery    - Hypoglycemia protocol in place     Asthma  - Continue home Flovent  - Continue home Symbicort     DVT prophylaxis: Lovenox 40 mg SC  GI prophylaxis: Famotidine 20 mg BID      Consultations:   Consults: IP CONSULT TO PLASTIC SURGERY  IP CONSULT TO INFECTIOUS DISEASES  IP CONSULT TO PLASTIC SURGERY  IP CONSULT TO SOCIAL WORK  PT/OT     The severity of this patient's signs and symptoms (Wound dehiscence requiring surgery ) indicate the need for an inpatient admission.       Above plan discussed with the patient who agreed to the above plan     Plan will be discussed with the attending,Dr. Lance Hutchison Cinda Galarza MD  Family Medicine Resident  10/7/2021 10:26 AM

## 2021-10-07 NOTE — PROGRESS NOTES
[facility]  CDU / OBSERVATION ENCOUNTER  INTERVAL NOTE       In chart review patient is noted to be followed by [facility] primarily. In the morning please notify [facility] attending and residents and give them the opportunity to assume care. Will watch the patient overnight for pain control. The Family history, social history, and ROS are effectively unchanged since admission unless noted elsewhere in the chart. The patient has been updated on the plan of care. The patient is agreeable with the current plan.     Fauzia Perez MD

## 2021-10-08 PROBLEM — T81.30XA WOUND DEHISCENCE: Status: ACTIVE | Noted: 2021-10-08

## 2021-10-08 LAB
ALBUMIN SERPL-MCNC: 3.6 G/DL (ref 3.5–5.2)
ALBUMIN/GLOBULIN RATIO: 0.9 (ref 1–2.5)
ALP BLD-CCNC: 110 U/L (ref 35–104)
ALT SERPL-CCNC: 11 U/L (ref 5–33)
AST SERPL-CCNC: 16 U/L
BILIRUB SERPL-MCNC: <0.1 MG/DL (ref 0.3–1.2)
BILIRUBIN DIRECT: <0.08 MG/DL
BILIRUBIN, INDIRECT: ABNORMAL MG/DL (ref 0–1)
EKG ATRIAL RATE: 82 BPM
EKG P AXIS: 57 DEGREES
EKG P-R INTERVAL: 160 MS
EKG Q-T INTERVAL: 394 MS
EKG QRS DURATION: 92 MS
EKG QTC CALCULATION (BAZETT): 460 MS
EKG R AXIS: 2 DEGREES
EKG T AXIS: 25 DEGREES
EKG VENTRICULAR RATE: 82 BPM
GLOBULIN: ABNORMAL G/DL (ref 1.5–3.8)
GLUCOSE BLD-MCNC: 159 MG/DL (ref 65–105)
GLUCOSE BLD-MCNC: 177 MG/DL (ref 65–105)
GLUCOSE BLD-MCNC: 181 MG/DL (ref 65–105)
GLUCOSE BLD-MCNC: 204 MG/DL (ref 65–105)
TOTAL PROTEIN: 7.8 G/DL (ref 6.4–8.3)

## 2021-10-08 PROCEDURE — 99226 PR SBSQ OBSERVATION CARE/DAY 35 MINUTES: CPT | Performed by: STUDENT IN AN ORGANIZED HEALTH CARE EDUCATION/TRAINING PROGRAM

## 2021-10-08 PROCEDURE — 96376 TX/PRO/DX INJ SAME DRUG ADON: CPT

## 2021-10-08 PROCEDURE — 93005 ELECTROCARDIOGRAM TRACING: CPT | Performed by: EMERGENCY MEDICINE

## 2021-10-08 PROCEDURE — 93010 ELECTROCARDIOGRAM REPORT: CPT | Performed by: INTERNAL MEDICINE

## 2021-10-08 PROCEDURE — 2580000003 HC RX 258: Performed by: INTERNAL MEDICINE

## 2021-10-08 PROCEDURE — 2500000003 HC RX 250 WO HCPCS: Performed by: STUDENT IN AN ORGANIZED HEALTH CARE EDUCATION/TRAINING PROGRAM

## 2021-10-08 PROCEDURE — 80076 HEPATIC FUNCTION PANEL: CPT

## 2021-10-08 PROCEDURE — 6360000002 HC RX W HCPCS: Performed by: INTERNAL MEDICINE

## 2021-10-08 PROCEDURE — 6370000000 HC RX 637 (ALT 250 FOR IP): Performed by: STUDENT IN AN ORGANIZED HEALTH CARE EDUCATION/TRAINING PROGRAM

## 2021-10-08 PROCEDURE — 96366 THER/PROPH/DIAG IV INF ADDON: CPT

## 2021-10-08 PROCEDURE — 6360000002 HC RX W HCPCS: Performed by: STUDENT IN AN ORGANIZED HEALTH CARE EDUCATION/TRAINING PROGRAM

## 2021-10-08 PROCEDURE — 82947 ASSAY GLUCOSE BLOOD QUANT: CPT

## 2021-10-08 PROCEDURE — 96372 THER/PROPH/DIAG INJ SC/IM: CPT

## 2021-10-08 PROCEDURE — 36415 COLL VENOUS BLD VENIPUNCTURE: CPT

## 2021-10-08 PROCEDURE — 2580000003 HC RX 258: Performed by: STUDENT IN AN ORGANIZED HEALTH CARE EDUCATION/TRAINING PROGRAM

## 2021-10-08 PROCEDURE — 6370000000 HC RX 637 (ALT 250 FOR IP)

## 2021-10-08 PROCEDURE — 6370000000 HC RX 637 (ALT 250 FOR IP): Performed by: INTERNAL MEDICINE

## 2021-10-08 PROCEDURE — 1200000000 HC SEMI PRIVATE

## 2021-10-08 PROCEDURE — 99232 SBSQ HOSP IP/OBS MODERATE 35: CPT | Performed by: INTERNAL MEDICINE

## 2021-10-08 RX ORDER — PREGABALIN 75 MG/1
75 CAPSULE ORAL 2 TIMES DAILY
Status: DISCONTINUED | OUTPATIENT
Start: 2021-10-08 | End: 2021-10-12 | Stop reason: HOSPADM

## 2021-10-08 RX ORDER — PREGABALIN 100 MG/1
100 CAPSULE ORAL 2 TIMES DAILY
Status: DISCONTINUED | OUTPATIENT
Start: 2021-10-08 | End: 2021-10-08

## 2021-10-08 RX ADMIN — INSULIN GLARGINE 10 UNITS: 100 INJECTION, SOLUTION SUBCUTANEOUS at 08:44

## 2021-10-08 RX ADMIN — MORPHINE SULFATE 1 MG: 2 INJECTION, SOLUTION INTRAMUSCULAR; INTRAVENOUS at 18:43

## 2021-10-08 RX ADMIN — TRAZODONE HYDROCHLORIDE 200 MG: 100 TABLET ORAL at 20:19

## 2021-10-08 RX ADMIN — MORPHINE SULFATE 1 MG: 2 INJECTION, SOLUTION INTRAMUSCULAR; INTRAVENOUS at 06:45

## 2021-10-08 RX ADMIN — ACETAMINOPHEN 650 MG: 325 TABLET ORAL at 12:36

## 2021-10-08 RX ADMIN — MORPHINE SULFATE 1 MG: 2 INJECTION, SOLUTION INTRAMUSCULAR; INTRAVENOUS at 01:49

## 2021-10-08 RX ADMIN — VENLAFAXINE HYDROCHLORIDE 150 MG: 150 CAPSULE, EXTENDED RELEASE ORAL at 08:48

## 2021-10-08 RX ADMIN — INSULIN LISPRO 2 UNITS: 100 INJECTION, SOLUTION INTRAVENOUS; SUBCUTANEOUS at 17:42

## 2021-10-08 RX ADMIN — INSULIN GLARGINE 10 UNITS: 100 INJECTION, SOLUTION SUBCUTANEOUS at 20:37

## 2021-10-08 RX ADMIN — MORPHINE SULFATE 1 MG: 2 INJECTION, SOLUTION INTRAMUSCULAR; INTRAVENOUS at 10:46

## 2021-10-08 RX ADMIN — CEFTAROLINE FOSAMIL 600 MG: 600 POWDER, FOR SOLUTION INTRAVENOUS at 08:40

## 2021-10-08 RX ADMIN — INSULIN LISPRO 2 UNITS: 100 INJECTION, SOLUTION INTRAVENOUS; SUBCUTANEOUS at 12:37

## 2021-10-08 RX ADMIN — FAMOTIDINE 20 MG: 10 INJECTION INTRAVENOUS at 20:19

## 2021-10-08 RX ADMIN — INSULIN LISPRO 2 UNITS: 100 INJECTION, SOLUTION INTRAVENOUS; SUBCUTANEOUS at 20:36

## 2021-10-08 RX ADMIN — MORPHINE SULFATE 1 MG: 2 INJECTION, SOLUTION INTRAMUSCULAR; INTRAVENOUS at 22:50

## 2021-10-08 RX ADMIN — PREGABALIN 75 MG: 75 CAPSULE ORAL at 20:19

## 2021-10-08 RX ADMIN — FAMOTIDINE 20 MG: 10 INJECTION INTRAVENOUS at 10:33

## 2021-10-08 RX ADMIN — SODIUM CHLORIDE, PRESERVATIVE FREE 10 ML: 5 INJECTION INTRAVENOUS at 08:47

## 2021-10-08 RX ADMIN — MORPHINE SULFATE 1 MG: 2 INJECTION, SOLUTION INTRAMUSCULAR; INTRAVENOUS at 15:11

## 2021-10-08 RX ADMIN — FLUCONAZOLE 400 MG: 200 TABLET ORAL at 20:19

## 2021-10-08 RX ADMIN — INSULIN LISPRO 2 UNITS: 100 INJECTION, SOLUTION INTRAVENOUS; SUBCUTANEOUS at 08:43

## 2021-10-08 RX ADMIN — CEFTAROLINE FOSAMIL 600 MG: 600 POWDER, FOR SOLUTION INTRAVENOUS at 20:26

## 2021-10-08 ASSESSMENT — ENCOUNTER SYMPTOMS
ABDOMINAL PAIN: 1
SHORTNESS OF BREATH: 0
EYE PAIN: 0

## 2021-10-08 ASSESSMENT — PAIN SCALES - GENERAL
PAINLEVEL_OUTOF10: 10
PAINLEVEL_OUTOF10: 9
PAINLEVEL_OUTOF10: 9
PAINLEVEL_OUTOF10: 5
PAINLEVEL_OUTOF10: 10
PAINLEVEL_OUTOF10: 10
PAINLEVEL_OUTOF10: 5
PAINLEVEL_OUTOF10: 5
PAINLEVEL_OUTOF10: 3
PAINLEVEL_OUTOF10: 8
PAINLEVEL_OUTOF10: 10
PAINLEVEL_OUTOF10: 9
PAINLEVEL_OUTOF10: 10
PAINLEVEL_OUTOF10: 10

## 2021-10-08 NOTE — PROGRESS NOTES
Comprehensive Nutrition Assessment    Type and Reason for Visit:  Initial, Positive Nutrition Screen (Wound)    Nutrition Recommendations/Plan: Continue current diet. Encourage/monitor PO intakes as tolerated. Will monitor need for ONS with meals. Will monitor plans, weights, and plan of care. Nutrition Assessment:  Pt admitted for chest pain and finger pain. Pt with previous incision and drainage of the right index finger on 9/14 for abscess - pt did not follow-up with surgeon and wound has dehisced. Pt with drainage and pain to right finger. Noted plans for amputation on Monday. PMH includes: DM, HTN, Lupus, Fibromyalgia. Weight history reviewed. Pt currently tolerating an oral diet and eating at least 50% of meals. Labs reviewed: Glucose 159-177 mg/dL. Meds reviewed: Lantus, Humalog SS. Malnutrition Assessment:  Malnutrition Status: At risk for malnutrition    Context:  Acute Illness     Findings of the 6 clinical characteristics of malnutrition:  Energy Intake:  Mild decrease in energy intake  Weight Loss:  No significant weight loss     Body Fat Loss:  No significant body fat loss   Muscle Mass Loss:  No significant muscle mass loss  Fluid Accumulation:  No significant fluid accumulation   Strength:  Not Performed    Estimated Daily Nutrient Needs:  Energy (kcal):  MSJ x 1.1-1.2 = 0237-3788 kcals/day; Weight Used for Energy Requirements:  Current     Protein (g):  1.2-1.5 gm/kg = 70-90 gm pro/day; Weight Used for Protein Requirements:  Ideal        Fluid (ml/day):  2100 mL/day or per MD; Method Used for Fluid Requirements:  Other (Comment) (Mj Pillai)      Nutrition Related Findings:  Labs/Meds reviewed. Wounds:  Open Wounds (Non-healing surgical wound to right index finger.)       Current Nutrition Therapies:    ADULT DIET;  Regular; 4 carb choices (60 gm/meal)    Anthropometric Measures:  · Height: 5' 6\" (167.6 cm)  · Current Body Weight: 230 lb (104.3 kg)   · Admission Body Weight: 230 lb (104.3 kg)    · Usual Body Weight: 244 lb 9.6 oz (110.9 kg) (10/20/20 bedscale per chart review)     · Ideal Body Weight: 130 lbs; % Ideal Body Weight 176.9 %   · BMI: 37.1  · BMI Categories: Obese Class 2 (BMI 35.0 -39.9)       Nutrition Diagnosis:   · Increased nutrient needs related to  (healing) as evidenced by wounds (non-healing surgical wound to right index finger)    Nutrition Interventions:   Food and/or Nutrient Delivery:  Continue Current Diet (Monitor need for oral supplements with meals.)  Nutrition Education/Counseling:  No recommendation at this time   Coordination of Nutrition Care:  Continue to monitor while inpatient    Goals:  Oral intakes to meet at least 75% of estimated nutrition needs.        Nutrition Monitoring and Evaluation:   Food/Nutrient Intake Outcomes:  Food and Nutrient Intake, Supplement Intake  Physical Signs/Symptoms Outcomes:  Biochemical Data, GI Status, Fluid Status or Edema, Hemodynamic Status, Nutrition Focused Physical Findings, Skin, Weight     Electronically signed by Ion Milian RD, FRANCESCO on 10/8/21 at 3:19 PM EDT    Contact: [phone]

## 2021-10-08 NOTE — CARE COORDINATION
Transition planning    Referal sent to [address] [place] [address] - they are able to accept pt back - call them if pt decides and need is there -

## 2021-10-08 NOTE — PROGRESS NOTES
[facility] -   Infectious diseases evaluation  admission date 10/6/2021    reason for consultation:   Right hand infection    Impression :   Current:  · Right second finger infected soft tissue then osteomyelitis  · Right hand soft tissue infection  · Diabetes on insulin  · SLE    Other:  ·   Discussion / summary of stay / plan of care   ·   Recommendations   · Continue high-dose Diflucan 400 mg a day p.o.  · Continue ceftaroline to spare the kidneys and diabetic  · Aerobic culture from 10/7 grew GPC identified as Staph aureus and GNR, will adjust treatment accordingly  · Await amputation by orthopedic on Monday  · Will discuss plan with Dr. Slick Pate who will make changes accordingly      Infection Control Recommendations   · [place] Precautions      Antimicrobial Stewardship Recommendations   · Simplification of therapy  · Targeted therapy  · IV to oral conversion  · Per Kg dosing  · Restricted antimicrobial use  · Discontinuation of therapy    Coordination ofOutpatient Care:   · Estimated Length of IV antimicrobials:  · Patient will need Midline / picc Catheter Insertion:   · Patient will need SNF:  · Patient will need outpatient wound care:     History of Present Illness:   Initial history:  Syed Delgado is a 49y.o.-year-old female very familiar to me who was admitted earlier this month to the hospital with a cellulitis and an abscess over the right hand mostly the right second finger, cultures grew gram-positive cocci as well as Candida and the patient was discharged on Diflucan and amoxicillin. The target was soft tissue infection and tendinitis, there was a concern of possible osteomyelitis. The patient demanded amputation before leaving. Ortho had followed her and planned to see her again in amputate the finger if it does not respond to antibiotic therapy.      She comes back with the finger still hurting her, has not improved, and was planned to go for amputation of the morning. Her finger is very stiff the soft tissues are hard, she is not able to bend it  Picture as below. Cultures from the finger 9/23 showing staph epidermides 2 strains methicillin-resistant and staph lugdunensis methicillin resistant     9/14 culture showed Candida glabrata  Interval changes  10/8/2021   Patient Vitals for the past 8 hrs:   BP Temp Temp src Pulse Resp SpO2   10/08/21 0835 (!) 157/87 97.3 °F (36.3 °C) Oral 74 15 94 %     10/8  Patient states pain in right hand has improved  Afebrile  Aerobic culture grew GPC in chains identified as Staph aureus and lactose-fermenting GNR  Surgery to be performed on monday        Summary of relevant labs:  Labs: WBC 5.5   CRP 34.1    Micro: Aerobic Culture (10/7) grew GPC in pairs    Imaging:      I have personally reviewed the past medical history, past surgical history, medications, social history, and family history, and I haveupdated the database accordingly. Allergies:   Bactrim [sulfamethoxazole-trimethoprim] and Adhesive tape     Review of Systems:     Review of Systems   Constitutional: Negative for activity change. HENT: Negative for congestion. Eyes: Negative for pain. Respiratory: Negative for shortness of breath. Cardiovascular: Negative for chest pain. Gastrointestinal: Positive for abdominal pain. Endocrine: Negative for cold intolerance. Genitourinary: Negative for dysuria. Musculoskeletal: Negative for arthralgias. Skin: Positive for wound. Allergic/Immunologic: Negative for immunocompromised state. Neurological: Negative for dizziness. Hematological: Negative for adenopathy. Psychiatric/Behavioral: Negative for agitation and confusion. Physical Examination :       Physical Exam  Constitutional:       Appearance: Normal appearance. HENT:      Head: Normocephalic. Mouth/Throat:      Mouth: Mucous membranes are moist.      Pharynx: Oropharynx is clear.    Cardiovascular: chart notes    Suicidal ideation     Suicidal intent 3/10/2017    Vitamin D deficiency 11/28/2017    White matter changes 6/13/2017       Past Surgical  History:     Past Surgical History:   Procedure Laterality Date    ABDOMEN SURGERY      drain tube    ABSCESS DRAINAGE      right buttock    CATARACT REMOVAL WITH IMPLANT Bilateral     CHEST TUBE INSERTION      FINGER AMPUTATION Left 03/13/2021    LEFT RING FINER AMPUTATION performed by Flako Lau MD at [address] Right 09/15/2021     I&D INDEX FINGER     HAND SURGERY Right 09/14/2021    I&D INDEX FINGER performed by Flako Lau MD at [address] Right 9/15/2021    I&D INDEX FINGER performed by Flako Lau MD at [facility] LASIK Bilateral        Medications:      insulin glargine  10 Units SubCUTAneous BID    traZODone  200 mg Oral Nightly    venlafaxine  150 mg Oral Daily with breakfast    sodium chloride flush  5-40 mL IntraVENous 2 times per day    enoxaparin  40 mg SubCUTAneous Daily    famotidine (PEPCID) injection  20 mg IntraVENous BID    insulin lispro  0-12 Units SubCUTAneous TID WC    insulin lispro  0-6 Units SubCUTAneous Nightly    ceftaroline fosamil (TEFLARO) IVPB  600 mg IntraVENous Q12H    fluconazole  400 mg Oral Q24H       Social History:     Social History     Socioeconomic History    Marital status: Legally      Spouse name: Not on file    Number of children: Not on file    Years of education: Not on file    Highest education level: Not on file   Occupational History    Not on file   Tobacco Use    Smoking status: Never Smoker    Smokeless tobacco: Never Used   Vaping Use    Vaping Use: Never used   Substance and Sexual Activity    Alcohol use: Not Currently    Drug use: Yes     Types: Marijuana, Cocaine     Comment: + Cocaine 2/2021 also, see Care Everywhere UDS    Sexual activity: Not Currently     Partners: Male   Other Topics Concern    Not on file Social History Narrative    Not on file     Social Determinants of Health     Financial Resource Strain: High Risk    Difficulty of Paying Living Expenses: Hard   Food Insecurity: Food Insecurity Present    Worried About Running Out of Food in the Last Year: Often true    [place] of Food in the Last Year: Often true   Transportation Needs: Unmet Transportation Needs    Lack of Transportation (Medical): Yes    Lack of Transportation (Non-Medical): Yes   Physical Activity: Inactive    Days of Exercise per Week: 0 days    Minutes of Exercise per Session: 0 min   Stress: Stress Concern Present    Feeling of Stress : Rather much   Social Connections:     Frequency of Communication with Friends and Family:     Frequency of Social Gatherings with Friends and Family:     Attends Orthodox Services:     Active Member of Clubs or Organizations:     Attends Club or Organization Meetings:     Marital Status:    Intimate Partner Violence:     Fear of Current or Ex-Partner:     Emotionally Abused:     Physically Abused:     Sexually Abused:        Family History:     Family History   Problem Relation Age of Onset    Diabetes Mother     Stroke Mother     Diabetes Father     Diabetes Sister     Diabetes Brother     Other Son         GSW    No Known Problems Sister       Medical Decision Making:   I have independently reviewed/ordered the following labs:    CBC with Differential:   Recent Labs     10/06/21  1518   WBC 5.5   HGB 9.7*   HCT 30.5*      LYMPHOPCT 20*   MONOPCT 10     BMP:  Recent Labs     10/06/21  1518      K 3.9   CL 99   CO2 20   BUN 12   CREATININE 0.68     Hepatic Function Panel: No results for input(s): PROT, LABALBU, BILIDIR, IBILI, BILITOT, ALKPHOS, ALT, AST in the last 72 hours. No results for input(s): RPR in the last 72 hours. No results for input(s): HIV in the last 72 hours. No results for input(s): BC in the last 72 hours.   Lab Results   Component Value Date CREATININE 0.68 10/06/2021    GLUCOSE 182 10/06/2021       Detailed results: Thank you for allowing us to participate in the care of this patient. Please call with questions. This note is created with the assistance of a speech recognition program.  While intending to generate adocument that actually reflects the content of the visit, the document can still have some errors including those of syntax and sound a like substitutions which may escape proof reading. It such instances, actual meaningcan be extrapolated by contextual diversion. Twyla Rae  Office: [phone]  Perfect serve / office [phone]        I have discussed the care of the patient, including pertinent history and exam findings,  with the resident. I have seen and examined the patient and the key elements of all parts of the encounter have been performed by me. I agree with the assessment, plan and orders as documented by the resident.     Maylin Puga, Infectious Diseases

## 2021-10-08 NOTE — PROGRESS NOTES
[facility]  Progress Note    Date:   10/8/2021  Patient name:  Joselyn Christensen  Date of admission:  10/6/2021  2:33 PM  MRN:   [id number]  YOB: 1965    SUBJECTIVE/Last 24 hours update:     Patient seen and examined at the bed side , no new acute events overnight. Patient states pain is better controlled today. Just informed that orthopedic surgery will not be able to perform patients surgery until Monday. Notes from nursing staff and Consults had been reviewed, and the overnight progress had been checked with the nursing staff as well.       REVIEW OF SYSTEMS:      CONSTITUTIONAL:  no fevers, no headcahes  HEENT: No headaches  RESPIRATORY:negative for dyspnea, no wheezing, no Cough  CARDIOVASCULAR: negative for chest pain, no palpitations  GASTROINTESTINAL: no nausea, no vomiting, no change in bowel habits, no abdominal pain   GENITOURINARY: negative for dysuria, no hematuria   MUSCULOSKELETAL: no joint pains, no muscle aches, swelling in right index finger with some pain  NEUROLOGICAL: No  Weakness or numbness      PAST MEDICAL HISTORY:      has a past medical history of Acid reflux, Acute cystitis with hematuria, Acute non-recurrent maxillary sinusitis, Asthma, Bipolar 1 disorder ([address] Bipolar disorder, mixed ([address] BMI 34.0-34.9,adult, Cannabis use disorder, severe, dependence ([address] Cerebrovascular accident (CVA) ([address] Chest pain, Chronic renal insufficiency, Cocaine abuse ([address] COVID-19 virus RNA test result unknown, DDD (degenerative disc disease), cervical, Diabetes mellitus ([address] Dizziness, Fibromyalgia, History of stroke, Homicidal ideation, Hyperglycemia, Hypertension, Hypotension, IDDM (insulin dependent diabetes mellitus), Lupus ([address] Migraine, Neuropathy, Neuropathy, Polysubstance abuse ([address] Post traumatic stress disorder (PTSD), Posttraumatic stress disorder, Recurrent depression ([address] Recurrent major depressive disorder, in partial remission ([facility] Screening mammogram, encounter for, Severe recurrent major depression with psychotic features ([facility] Severe recurrent major depression without psychotic features ([facility] Stroke (cerebrum) ([facility] [facility] Stroke ([facility] Suicidal ideation, Suicidal intent, Vitamin D deficiency, and White matter changes. PAST SURGICAL HISTORY:      has a past surgical history that includes Abscess Drainage; chest tube insertion; Abdomen surgery; Cataract removal with implant (Bilateral); LASIK (Bilateral); Finger amputation (Left, 03/13/2021); Hand surgery (Right, 09/14/2021); Finger surgery (Right, 09/15/2021); and Hand surgery (Right, 9/15/2021). SOCIAL HISTORY:      reports that she has never smoked. She has never used smokeless tobacco. She reports previous alcohol use. She reports current drug use. Drugs: Marijuana and Cocaine. FAMILY HISTORY:     family history includes Diabetes in her brother, father, mother, and sister; No Known Problems in her sister; Other in her son; Stroke in her mother. HOME MEDICATIONS:      Prior to Admission medications    Medication Sig Start Date End Date Taking? Authorizing Provider   amoxicillin (AMOXIL) 500 MG capsule Take 2 capsules by mouth 2 times daily for 21 days 9/24/21 10/15/21  Maylin Aguilera MD   fluconazole (DIFLUCAN) 200 MG tablet Take 2 tablets by mouth daily for 21 days Till 10/11/21 - 21 days  Watch LFT 2 x per week 9/20/21 10/11/21  Maylin Alicea MD   cetirizine (ZYRTEC) 10 MG tablet Take 1 tablet by mouth daily 8/9/21   Jose Macedo MD   insulin glargine (LANTUS SOLOSTAR) 100 UNIT/ML injection pen Inject 20 Units into the skin 2 times daily 8/9/21   Jose Macedo MD   pregabalin (LYRICA) 75 MG capsule Take 1 capsule by mouth 2 times daily for 30 days. Patient taking differently: Take 100 mg by mouth 2 times daily.   8/9/21 9/8/21  Jose Macedo MD   pantoprazole (PROTONIX) 20 MG tablet Take 1 tablet by mouth 2 times daily (before meals) 7/22/21   Monica Calderon MD   metFORMIN (GLUCOPHAGE) 1000 MG tablet Take 1 tablet by mouth 2 times daily (with meals) 7/22/21   Monica Calderon MD   venlafaxine (EFFEXOR XR) 150 MG extended release capsule Take 1 capsule by mouth daily (with breakfast) 7/22/21   Monica Calderon MD   dicyclomine (BENTYL) 10 MG capsule Take 1 capsule by mouth 4 times daily 7/12/21   Alexandre Tamez MD   Insulin Pen Needle ([employer] PEN NEEDLES 31G) 31G X 8 MM MISC 1 each by Does not apply route daily 1/4/21   Monica Calderon MD    MG capsule TAKE 1 CAPSULE BY MOUTH TWICE DAILY 12/9/20   Monica Calderon MD   traZODone (DESYREL) 150 MG tablet Take 1 tablet by mouth nightly  Patient taking differently: Take 200 mg by mouth nightly  11/18/20   Monica Calderon MD   FREESTYLE LITE strip 1 each by Does not apply route 3 times daily 11/11/20   Monica Calderon MD   FreeStyle Lancets MISC 1 each by Does not apply route 3 times daily 11/11/20   Monica Calderon MD   albuterol sulfate  (90 Base) MCG/ACT inhaler Inhale 2 puffs into the lungs every 4 hours as needed for Wheezing 10/20/20 9/22/21  Monica Calderon MD   FLOVENT  MCG/ACT inhaler Inhale 2 puffs into the lungs 2 times daily  Patient not taking: Reported on 9/22/2021 10/20/20   Monica Calderon MD   budesonide-formoterol (SYMBICORT) 80-4.5 MCG/ACT AERO Inhale 2 puffs into the lungs 2 times daily 10/20/20   Monica Calderon MD       ALLERGIES:     Bactrim [sulfamethoxazole-trimethoprim] and Adhesive tape      OBJECTIVE:       Vitals:    10/07/21 0315 10/07/21 0954 10/07/21 2053 10/08/21 0835   BP: 103/60 106/69 115/76 (!) 157/87   Pulse: 80 80 80 74   Resp: 15 16 21 15   Temp:  97.9 °F (36.6 °C) 97.3 °F (36.3 °C) 97.3 °F (36.3 °C)   TempSrc:  Oral Oral Oral   SpO2:   96% 94%   Weight:       Height:           No intake or output data in the 24 hours ending 10/08/21 0921    PHYSICAL EXAM:  General Appearance  Alert , awake , not in acute distress  HEENT - Head is normocephalic  Lungs - Bilateral equal air entry , no wheezes, rales or rhonchi, aeration good  Cardiovascular - Heart sounds are normal.  Regular rhythm, normal rate without murmur, gallop or rub. Abdomen - Soft, nontender, nondistended, no masses or organomegaly  Neurologic - There are no new focal motor or sensory deficits  Skin - open wound on right index finger, currently wrapped with gaze   Extremities - No cyanosis or edema       DIAGNOSTICS:     Laboratory Testing:    Recent Results (from the past 24 hour(s))   POC Glucose Fingerstick    Collection Time: 10/07/21  9:57 AM   Result Value Ref Range    POC Glucose 182 (H) 65 - 105 mg/dL   POC Glucose Fingerstick    Collection Time: 10/07/21 11:47 AM   Result Value Ref Range    POC Glucose 199 (H) 65 - 105 mg/dL   Culture, Body Fluid    Collection Time: 10/07/21  1:30 PM    Specimen: Body Fluid   Result Value Ref Range    Specimen Description . BODY FLUID     Special Requests NOT REPORTED     Direct Exam NOT REPORTED     Culture       DUE TO THE SPECIMEN TYPE, THE ORDER WAS CANCELED AND REORDERED. PLEASE REFER TO: AEROBIC CULTURE ORDERED ON 10/7/21   Culture, Aerobic    Collection Time: 10/07/21  2:55 PM    Specimen: Finger   Result Value Ref Range    Specimen Description . FINGER RT FINGER SWAB     Special Requests NOT REPORTED     Direct Exam RARE NEUTROPHILS (A)     Direct Exam FEW GRAM POSITIVE COCCI IN PAIRS (A)     Culture PENDING    POC Glucose Fingerstick    Collection Time: 10/07/21  3:56 PM   Result Value Ref Range    POC Glucose 215 (H) 65 - 105 mg/dL   POC Glucose Fingerstick    Collection Time: 10/07/21  8:20 PM   Result Value Ref Range    POC Glucose 265 (H) 65 - 105 mg/dL   POC Glucose Fingerstick    Collection Time: 10/08/21  8:09 AM   Result Value Ref Range    POC Glucose 159 (H) 65 - 105 mg/dL       Current Facility-Administered Medications   Medication Dose Route Frequency Provider Last Rate Last Admin    insulin glargine (LANTUS) 10/07/21 1024 New Bag at 10/07/21 1024    insulin lispro (HUMALOG) injection vial 0-12 Units  0-12 Units SubCUTAneous TID WC Rosibel Gell, DO   2 Units at 10/08/21 0843    insulin lispro (HUMALOG) injection vial 0-6 Units  0-6 Units SubCUTAneous Nightly Rosibel Gell, DO   3 Units at 10/07/21 2118    glucose (GLUTOSE) 40 % oral gel 15 g  15 g Oral PRN Rosibel Gell, DO        dextrose 50 % IV solution  12.5 g IntraVENous PRN Rosibel Gell, DO        glucagon (rDNA) injection 1 mg  1 mg IntraMUSCular PRN Rosibel Gell, DO        dextrose 5 % solution  100 mL/hr IntraVENous PRN Rosibel Gell, DO        ceftaroline fosamil (TEFLARO) 600 mg in dextrose 5 % 50 mL IVPB  600 mg IntraVENous Q12H Maylin Garcia MD 50 mL/hr at 10/08/21 0840 600 mg at 10/08/21 0840    fluconazole (DIFLUCAN) tablet 400 mg  400 mg Oral Q24H Mae Sarmiento MD   400 mg at 10/07/21 2111       ASSESSMENT:     Active Problems:    Open wound of right index finger    Adverse effect of COVID-19 vaccine  Resolved Problems:    * No resolved hospital problems. *      PLAN:     Discussed care plan with nurse after getting her input.     Wound Dehiscence of right index finger  - Morphine for pain management   - Orthopedic surgery on board              - Planning for surgery on Monday with repeat I&D and possible amputation   -Infectious disease on board   -Continue ceftaroline and Diflucan due to gram-positive cocci as well as Candida     Bipolar depression   - Continue home Effexor      Diabetes mellitus, type 2   - Medium dose sliding scale   - Holding Lantus until after surgery    - Hypoglycemia protocol in place      Asthma  - Continue home Flovent  - Continue home Symbicort      DVT prophylaxis: Lovenox 40 mg SC  GI prophylaxis: Famotidine 20 mg BID    Above plan discussed with the patient who agreed to the above plan     Plan will be discussed with the attending, Dr. Darryle Foreman, MD  Family Medicine Resident  10/8/2021 9:21 AM        Please note that this chart was generated using voice recognition Dragon dictation software.   Although every effort was made to ensure the accuracy of this automated transcription, some errors in transcription may have occurred

## 2021-10-08 NOTE — CARE COORDINATION
Case Management Initial Discharge Plan  Terri Palmer,             Met with:patient to discuss discharge plans. Information verified: address, contacts, phone number, , insurance Yes  Insurance Provider: reinaldo    Emergency Contact/Next of Kin name & number: per face sheet   Who are involved in patient's support system? PCP: Kain Dumont MD  Date of last visit: few months      Discharge Planning    Living Arrangements:    apt by self     Home has 0 stories  0 stairs to climb to get into front door, 0stairs to climb to reach second floor  Location of bedroom/bathroom in home main     Patient able to perform ADL's:Assisted    Current Services (outpatient & in home)   DME equipment: walker  DME provider:     Is patient receiving oral anticoagulation therapy? No    If indicated:   Physician managing anticoagulation treatment:   Where does patient obtain lab work for ATC treatment? Potential Assistance Needed:       Patient agreeable to home care: No  [place] of choice provided:  no    Prior SNF/Rehab Placement and Facility: [facility] 10/1   Agreeable to SNF/Rehab: tbd  [place] of choice provided: no     Evaluation: no    Expected Discharge date:       Patient expects to be discharged to: If home: is the family and/or caregiver wiling & able to provide support at home? no  Who will be providing this support? no*    Follow Up Appointment: Best Day/ Time:      Transportation provider: needs ride   Transportation arrangements needed for discharge: Yes    Readmission Risk              Risk of Unplanned Readmission:  0             Does patient have a readmission risk score greater than 14?: No  If yes, follow-up appointment must be made within 7 days of discharge.      Goals of Care:       Educated pt  on transitional options,  Will provide freedom of choice and are agreeable with plan      Discharge Plan: assess for needs - went into [facility]  pt

## 2021-10-08 NOTE — PROGRESS NOTES
Plastics - [id number]      Patient admitted with cellulitis/osteo right index finger. Now wishes finger amputated. Dressing taken down. Looks better than photo. Soft, no purulence or fluctuance. Appears infection improving with antibiotics. Moderate swelling. Distal wound granulating. She is able to flex and extend at MCP and PIP (there is no tendon to flex DIP). I have her scheduled for amputation on Monday. Will plan ray amputation. This was discussed with her and she is agreeable. In meantime continue with BID dressings.

## 2021-10-08 NOTE — PROGRESS NOTES
Noted consult for Wound / Ostomy service for chronic non-healing surgical wound to finger. Ortho/Plastics involved for further management and possible amputation. Discussed with primary RN. Will defer consult. Please call if further assistance is needed.

## 2021-10-09 LAB
ABSOLUTE EOS #: 0.33 K/UL (ref 0–0.44)
ABSOLUTE IMMATURE GRANULOCYTE: <0.03 K/UL (ref 0–0.3)
ABSOLUTE LYMPH #: 1.81 K/UL (ref 1.1–3.7)
ABSOLUTE MONO #: 0.64 K/UL (ref 0.1–1.2)
ANION GAP SERPL CALCULATED.3IONS-SCNC: 16 MMOL/L (ref 9–17)
BASOPHILS # BLD: 1 % (ref 0–2)
BASOPHILS ABSOLUTE: 0.03 K/UL (ref 0–0.2)
BUN BLDV-MCNC: 5 MG/DL (ref 6–20)
BUN/CREAT BLD: ABNORMAL (ref 9–20)
CALCIUM SERPL-MCNC: 8.8 MG/DL (ref 8.6–10.4)
CHLORIDE BLD-SCNC: 103 MMOL/L (ref 98–107)
CO2: 22 MMOL/L (ref 20–31)
CREAT SERPL-MCNC: 0.63 MG/DL (ref 0.5–0.9)
CULTURE: ABNORMAL
DIFFERENTIAL TYPE: ABNORMAL
DIRECT EXAM: ABNORMAL
DIRECT EXAM: ABNORMAL
EKG ATRIAL RATE: 76 BPM
EKG P AXIS: 61 DEGREES
EKG P-R INTERVAL: 162 MS
EKG Q-T INTERVAL: 428 MS
EKG QRS DURATION: 90 MS
EKG QTC CALCULATION (BAZETT): 481 MS
EKG R AXIS: 7 DEGREES
EKG T AXIS: 35 DEGREES
EKG VENTRICULAR RATE: 76 BPM
EOSINOPHILS RELATIVE PERCENT: 8 % (ref 1–4)
GFR AFRICAN AMERICAN: >60 ML/MIN
GFR NON-AFRICAN AMERICAN: >60 ML/MIN
GFR SERPL CREATININE-BSD FRML MDRD: ABNORMAL ML/MIN/{1.73_M2}
GFR SERPL CREATININE-BSD FRML MDRD: ABNORMAL ML/MIN/{1.73_M2}
GLUCOSE BLD-MCNC: 114 MG/DL (ref 65–105)
GLUCOSE BLD-MCNC: 145 MG/DL (ref 65–105)
GLUCOSE BLD-MCNC: 159 MG/DL (ref 70–99)
GLUCOSE BLD-MCNC: 229 MG/DL (ref 65–105)
GLUCOSE BLD-MCNC: 279 MG/DL (ref 65–105)
HCT VFR BLD CALC: 37.1 % (ref 36.3–47.1)
HEMOGLOBIN: 11.6 G/DL (ref 11.9–15.1)
IMMATURE GRANULOCYTES: 1 %
LYMPHOCYTES # BLD: 44 % (ref 24–43)
Lab: ABNORMAL
MCH RBC QN AUTO: 31.9 PG (ref 25.2–33.5)
MCHC RBC AUTO-ENTMCNC: 31.3 G/DL (ref 28.4–34.8)
MCV RBC AUTO: 101.9 FL (ref 82.6–102.9)
MONOCYTES # BLD: 16 % (ref 3–12)
NRBC AUTOMATED: 0 PER 100 WBC
PDW BLD-RTO: 13.2 % (ref 11.8–14.4)
PLATELET # BLD: 298 K/UL (ref 138–453)
PLATELET ESTIMATE: ABNORMAL
PMV BLD AUTO: 10.4 FL (ref 8.1–13.5)
POTASSIUM SERPL-SCNC: 3.4 MMOL/L (ref 3.7–5.3)
RBC # BLD: 3.64 M/UL (ref 3.95–5.11)
RBC # BLD: ABNORMAL 10*6/UL
SEG NEUTROPHILS: 31 % (ref 36–65)
SEGMENTED NEUTROPHILS ABSOLUTE COUNT: 1.27 K/UL (ref 1.5–8.1)
SODIUM BLD-SCNC: 141 MMOL/L (ref 135–144)
SPECIMEN DESCRIPTION: ABNORMAL
WBC # BLD: 4.1 K/UL (ref 3.5–11.3)
WBC # BLD: ABNORMAL 10*3/UL

## 2021-10-09 PROCEDURE — 93010 ELECTROCARDIOGRAM REPORT: CPT | Performed by: INTERNAL MEDICINE

## 2021-10-09 PROCEDURE — 1200000000 HC SEMI PRIVATE

## 2021-10-09 PROCEDURE — 2500000003 HC RX 250 WO HCPCS: Performed by: STUDENT IN AN ORGANIZED HEALTH CARE EDUCATION/TRAINING PROGRAM

## 2021-10-09 PROCEDURE — 80048 BASIC METABOLIC PNL TOTAL CA: CPT

## 2021-10-09 PROCEDURE — 6370000000 HC RX 637 (ALT 250 FOR IP): Performed by: STUDENT IN AN ORGANIZED HEALTH CARE EDUCATION/TRAINING PROGRAM

## 2021-10-09 PROCEDURE — 85025 COMPLETE CBC W/AUTO DIFF WBC: CPT

## 2021-10-09 PROCEDURE — 93005 ELECTROCARDIOGRAM TRACING: CPT | Performed by: INTERNAL MEDICINE

## 2021-10-09 PROCEDURE — 6370000000 HC RX 637 (ALT 250 FOR IP)

## 2021-10-09 PROCEDURE — 2580000003 HC RX 258: Performed by: INTERNAL MEDICINE

## 2021-10-09 PROCEDURE — 6370000000 HC RX 637 (ALT 250 FOR IP): Performed by: INTERNAL MEDICINE

## 2021-10-09 PROCEDURE — 2580000003 HC RX 258: Performed by: STUDENT IN AN ORGANIZED HEALTH CARE EDUCATION/TRAINING PROGRAM

## 2021-10-09 PROCEDURE — 82947 ASSAY GLUCOSE BLOOD QUANT: CPT

## 2021-10-09 PROCEDURE — 99233 SBSQ HOSP IP/OBS HIGH 50: CPT | Performed by: STUDENT IN AN ORGANIZED HEALTH CARE EDUCATION/TRAINING PROGRAM

## 2021-10-09 PROCEDURE — 94760 N-INVAS EAR/PLS OXIMETRY 1: CPT

## 2021-10-09 PROCEDURE — 6360000002 HC RX W HCPCS: Performed by: STUDENT IN AN ORGANIZED HEALTH CARE EDUCATION/TRAINING PROGRAM

## 2021-10-09 PROCEDURE — 36415 COLL VENOUS BLD VENIPUNCTURE: CPT

## 2021-10-09 PROCEDURE — 6360000002 HC RX W HCPCS: Performed by: INTERNAL MEDICINE

## 2021-10-09 PROCEDURE — 99232 SBSQ HOSP IP/OBS MODERATE 35: CPT | Performed by: INTERNAL MEDICINE

## 2021-10-09 RX ORDER — INSULIN GLARGINE 100 [IU]/ML
15 INJECTION, SOLUTION SUBCUTANEOUS 2 TIMES DAILY
Status: DISCONTINUED | OUTPATIENT
Start: 2021-10-09 | End: 2021-10-12

## 2021-10-09 RX ORDER — OXYCODONE HYDROCHLORIDE AND ACETAMINOPHEN 5; 325 MG/1; MG/1
1 TABLET ORAL EVERY 4 HOURS PRN
Status: DISCONTINUED | OUTPATIENT
Start: 2021-10-09 | End: 2021-10-09

## 2021-10-09 RX ORDER — FENTANYL CITRATE 50 UG/ML
50 INJECTION, SOLUTION INTRAMUSCULAR; INTRAVENOUS EVERY 4 HOURS PRN
Status: DISCONTINUED | OUTPATIENT
Start: 2021-10-09 | End: 2021-10-12

## 2021-10-09 RX ORDER — SENNA AND DOCUSATE SODIUM 50; 8.6 MG/1; MG/1
2 TABLET, FILM COATED ORAL DAILY PRN
Status: DISCONTINUED | OUTPATIENT
Start: 2021-10-09 | End: 2021-10-12

## 2021-10-09 RX ORDER — HYDRALAZINE HYDROCHLORIDE 20 MG/ML
5 INJECTION INTRAMUSCULAR; INTRAVENOUS EVERY 6 HOURS PRN
Status: DISCONTINUED | OUTPATIENT
Start: 2021-10-09 | End: 2021-10-12 | Stop reason: HOSPADM

## 2021-10-09 RX ORDER — FAMOTIDINE 20 MG/1
20 TABLET, FILM COATED ORAL 2 TIMES DAILY
Status: DISCONTINUED | OUTPATIENT
Start: 2021-10-09 | End: 2021-10-12 | Stop reason: HOSPADM

## 2021-10-09 RX ADMIN — MORPHINE SULFATE 1 MG: 2 INJECTION, SOLUTION INTRAMUSCULAR; INTRAVENOUS at 06:40

## 2021-10-09 RX ADMIN — INSULIN GLARGINE 10 UNITS: 100 INJECTION, SOLUTION SUBCUTANEOUS at 08:29

## 2021-10-09 RX ADMIN — INSULIN LISPRO 6 UNITS: 100 INJECTION, SOLUTION INTRAVENOUS; SUBCUTANEOUS at 11:55

## 2021-10-09 RX ADMIN — TRAZODONE HYDROCHLORIDE 200 MG: 100 TABLET ORAL at 20:09

## 2021-10-09 RX ADMIN — DOCUSATE SODIUM 50MG AND SENNOSIDES 8.6MG 2 TABLET: 8.6; 5 TABLET, FILM COATED ORAL at 15:59

## 2021-10-09 RX ADMIN — MORPHINE SULFATE 1 MG: 2 INJECTION, SOLUTION INTRAMUSCULAR; INTRAVENOUS at 03:11

## 2021-10-09 RX ADMIN — FAMOTIDINE 20 MG: 10 INJECTION INTRAVENOUS at 08:29

## 2021-10-09 RX ADMIN — MORPHINE SULFATE 1 MG: 2 INJECTION, SOLUTION INTRAMUSCULAR; INTRAVENOUS at 11:17

## 2021-10-09 RX ADMIN — CEFTAROLINE FOSAMIL 600 MG: 600 POWDER, FOR SOLUTION INTRAVENOUS at 20:13

## 2021-10-09 RX ADMIN — CEFTAROLINE FOSAMIL 600 MG: 600 POWDER, FOR SOLUTION INTRAVENOUS at 08:42

## 2021-10-09 RX ADMIN — VENLAFAXINE HYDROCHLORIDE 150 MG: 150 CAPSULE, EXTENDED RELEASE ORAL at 08:28

## 2021-10-09 RX ADMIN — PREGABALIN 75 MG: 75 CAPSULE ORAL at 08:28

## 2021-10-09 RX ADMIN — FENTANYL CITRATE 50 MCG: 50 INJECTION INTRAMUSCULAR; INTRAVENOUS at 20:08

## 2021-10-09 RX ADMIN — SODIUM CHLORIDE, PRESERVATIVE FREE 10 ML: 5 INJECTION INTRAVENOUS at 08:34

## 2021-10-09 RX ADMIN — FLUCONAZOLE 400 MG: 200 TABLET ORAL at 20:08

## 2021-10-09 RX ADMIN — INSULIN LISPRO 2 UNITS: 100 INJECTION, SOLUTION INTRAVENOUS; SUBCUTANEOUS at 08:29

## 2021-10-09 RX ADMIN — PREGABALIN 75 MG: 75 CAPSULE ORAL at 21:52

## 2021-10-09 RX ADMIN — OXYCODONE HYDROCHLORIDE AND ACETAMINOPHEN 1 TABLET: 5; 325 TABLET ORAL at 09:39

## 2021-10-09 RX ADMIN — FAMOTIDINE 20 MG: 20 TABLET, FILM COATED ORAL at 20:08

## 2021-10-09 RX ADMIN — FENTANYL CITRATE 50 MCG: 50 INJECTION INTRAMUSCULAR; INTRAVENOUS at 14:59

## 2021-10-09 RX ADMIN — INSULIN LISPRO 4 UNITS: 100 INJECTION, SOLUTION INTRAVENOUS; SUBCUTANEOUS at 17:40

## 2021-10-09 ASSESSMENT — PAIN SCALES - GENERAL
PAINLEVEL_OUTOF10: 10
PAINLEVEL_OUTOF10: 5
PAINLEVEL_OUTOF10: 9
PAINLEVEL_OUTOF10: 9
PAINLEVEL_OUTOF10: 10
PAINLEVEL_OUTOF10: 10
PAINLEVEL_OUTOF10: 7
PAINLEVEL_OUTOF10: 10
PAINLEVEL_OUTOF10: 10
PAINLEVEL_OUTOF10: 8
PAINLEVEL_OUTOF10: 10

## 2021-10-09 ASSESSMENT — ENCOUNTER SYMPTOMS
ABDOMINAL PAIN: 0
NAUSEA: 0
EYE PAIN: 0
SHORTNESS OF BREATH: 0
ABDOMINAL PAIN: 1

## 2021-10-09 NOTE — PLAN OF CARE
Problem: Falls - Risk of:  Goal: Will remain free from falls  Description: Will remain free from falls  Outcome: Ongoing  Goal: Absence of physical injury  Description: Absence of physical injury  Outcome: Ongoing     Problem: Sensory:  Goal: Ability to identify factors that increase the pain will improve  Description: Ability to identify factors that increase the pain will improve  Outcome: Ongoing  Goal: Ability to demonstrate ways to enhance comfort will improve  Description: Ability to demonstrate ways to enhance comfort will improve  Outcome: Ongoing  Goal: General experience of comfort will improve  Description: General experience of comfort will improve  Outcome: Ongoing     Problem: Skin Integrity:  Goal: Will show no infection signs and symptoms  Description: Will show no infection signs and symptoms  Outcome: Ongoing  Goal: Absence of new skin breakdown  Description: Absence of new skin breakdown  Outcome: Ongoing  Goal: Risk for impaired skin integrity will decrease  Description: Risk for impaired skin integrity will decrease  Outcome: Ongoing  Goal: Demonstration of wound healing without infection will improve  Description: Demonstration of wound healing without infection will improve  Outcome: Ongoing  Goal: Complications related to intravenous access or infusion will be avoided or minimized  Description: Complications related to intravenous access or infusion will be avoided or minimized  Outcome: Ongoing     Problem:  Activity:  Goal: Risk for activity intolerance will decrease  Description: Risk for activity intolerance will decrease  Outcome: Ongoing     Problem: Coping:  Goal: Ability to adjust to condition or change in health will improve  Description: Ability to adjust to condition or change in health will improve  Outcome: Ongoing     Problem: Fluid Volume:  Goal: Ability to maintain a balanced intake and output will improve  Description: Ability to maintain a balanced intake and output will improve  Outcome: Ongoing     Problem: Health Behavior:  Goal: Ability to identify and utilize available resources and services will improve  Description: Ability to identify and utilize available resources and services will improve  Outcome: Ongoing  Goal: Ability to manage health-related needs will improve  Description: Ability to manage health-related needs will improve  Outcome: Ongoing     Problem: Metabolic:  Goal: Ability to maintain appropriate glucose levels will improve  Description: Ability to maintain appropriate glucose levels will improve  Outcome: Ongoing     Problem: Nutritional:  Goal: Maintenance of adequate nutrition will improve  Description: Maintenance of adequate nutrition will improve  Outcome: Ongoing  Goal: Progress toward achieving an optimal weight will improve  Description: Progress toward achieving an optimal weight will improve  Outcome: Ongoing  Goal: Nutritional status will improve  Description: Nutritional status will improve  Outcome: Ongoing     Problem: Physical Regulation:  Goal: Complications related to the disease process, condition or treatment will be avoided or minimized  Description: Complications related to the disease process, condition or treatment will be avoided or minimized  Outcome: Ongoing  Goal: Diagnostic test results will improve  Description: Diagnostic test results will improve  Outcome: Ongoing  Goal: Will remain free from infection  Description: Will remain free from infection  Outcome: Ongoing  Goal: Ability to maintain vital signs within normal range will improve  Description: Ability to maintain vital signs within normal range will improve  Outcome: Ongoing     Problem: Tissue Perfusion:  Goal: Adequacy of tissue perfusion will improve  Description: Adequacy of tissue perfusion will improve  Outcome: Ongoing     Problem: Respiratory:  Goal: Ability to maintain normal respiratory secretions will improve  Description: Ability to maintain normal respiratory secretions will improve  Outcome: Ongoing     Problem: Nutrition  Goal: Optimal nutrition therapy  10/8/2021 2136 by Amrita Atkinson RN  Outcome: Ongoing  10/8/2021 1521 by Delfina Zuniga RD, LD  Outcome: Ongoing  Note: Nutrition Problem #1: Increased nutrient needs  Intervention: Food and/or Nutrient Delivery: Continue Current Diet (Monitor need for oral supplements with meals.)  Nutritional Goals: Oral intakes to meet at least 75% of estimated nutrition needs.

## 2021-10-09 NOTE — PROGRESS NOTES
[facility]  Progress Note    Date:   10/9/2021  Patient name:  Andreea Contreras  Date of admission:  10/6/2021  2:33 PM  MRN:   [id number]  YOB: 1965    SUBJECTIVE/Last 24 hours update:     Patient seen and examined at the bed side , no new acute events overnight. Patient was requesting additional pain medications this morning as her pain was not well controlled. Pain medication changed to fentanyl. Patient has not had a bowel movement in 3 days. We will add senna docusate. Patient denies any chest pain, shortness of breath, fevers or chills at this time. Notes from nursing staff and Consults had been reviewed, and the overnight progress had been checked with the nursing staff as well.     HPI:   See H&P    REVIEW OF SYSTEMS:      CONSTITUTIONAL:  no fevers, no headcahes  EYES: negative for blury vision  HEENT: No headaches, No nasal congestion, no difficulty swallowing  RESPIRATORY:negative for dyspnea, no wheezing, no Cough  CARDIOVASCULAR: negative for chest pain, no palpitations  GASTROINTESTINAL: no nausea, no vomiting, no change in bowel habits, no abdominal pain   GENITOURINARY: negative for dysuria, no hematuria   MUSCULOSKELETAL: Pain in her right index finger secondary to food no muscle aches, no swelling of joints or extremities  NEUROLOGICAL: No  Weakness or numbness    PAST MEDICAL HISTORY:      has a past medical history of Acid reflux, Acute cystitis with hematuria, Acute non-recurrent maxillary sinusitis, Asthma, Bipolar 1 disorder ([address] Bipolar disorder, mixed ([address] BMI 34.0-34.9,adult, Cannabis use disorder, severe, dependence ([address] Cerebrovascular accident (CVA) ([address] Chest pain, Chronic renal insufficiency, Cocaine abuse ([address] COVID-19 virus RNA test result unknown, DDD (degenerative disc disease), cervical, Diabetes mellitus ([address] Dizziness, Fibromyalgia, History of stroke, Homicidal ideation, Hyperglycemia, Hypertension, Hypotension, IDDM (insulin dependent diabetes mellitus), Lupus ([facility] Migraine, Neuropathy, Neuropathy, Polysubstance abuse ([facility] Post traumatic stress disorder (PTSD), Posttraumatic stress disorder, Recurrent depression ([facility] Recurrent major depressive disorder, in partial remission ([facility] Screening mammogram, encounter for, Severe recurrent major depression with psychotic features ([facility] Severe recurrent major depression without psychotic features ([facility] Stroke (cerebrum) ([facility] Stroke ([facility] Suicidal ideation, Suicidal intent, Vitamin D deficiency, and White matter changes. PAST SURGICAL HISTORY:      has a past surgical history that includes Abscess Drainage; chest tube insertion; Abdomen surgery; Cataract removal with implant (Bilateral); LASIK (Bilateral); Finger amputation (Left, 03/13/2021); Hand surgery (Right, 09/14/2021); Finger surgery (Right, 09/15/2021); and Hand surgery (Right, 9/15/2021). SOCIAL HISTORY:      reports that she has never smoked. She has never used smokeless tobacco. She reports previous alcohol use. She reports current drug use. Drugs: Marijuana and Cocaine. FAMILY HISTORY:     family history includes Diabetes in her brother, father, mother, and sister; No Known Problems in her sister; Other in her son; Stroke in her mother. HOME MEDICATIONS:      Prior to Admission medications    Medication Sig Start Date End Date Taking?  Authorizing Provider   amoxicillin (AMOXIL) 500 MG capsule Take 2 capsules by mouth 2 times daily for 21 days 9/24/21 10/15/21  Maylin Adams MD   fluconazole (DIFLUCAN) 200 MG tablet Take 2 tablets by mouth daily for 21 days Till 10/11/21 - 21 days  Watch LFT 2 x per week 9/20/21 10/11/21  Maylin Castillo MD   cetirizine (ZYRTEC) 10 MG tablet Take 1 tablet by mouth daily 8/9/21   Jose Macedo MD   insulin glargine (LANTUS SOLOSTAR) 100 UNIT/ML injection pen Inject 20 Units into the skin 2 times daily 8/9/21   Emily Garcia MD pregabalin (LYRICA) 75 MG capsule Take 1 capsule by mouth 2 times daily for 30 days. Patient taking differently: Take 100 mg by mouth 2 times daily.   8/9/21 9/8/21  Dian Muhammad MD   pantoprazole (PROTONIX) 20 MG tablet Take 1 tablet by mouth 2 times daily (before meals) 7/22/21   Jimbo Kendrick MD   metFORMIN (GLUCOPHAGE) 1000 MG tablet Take 1 tablet by mouth 2 times daily (with meals) 7/22/21   Jimbo Kendrick MD   venlafaxine (EFFEXOR XR) 150 MG extended release capsule Take 1 capsule by mouth daily (with breakfast) 7/22/21   Jimbo Kendrick MD   dicyclomine (BENTYL) 10 MG capsule Take 1 capsule by mouth 4 times daily 7/12/21   Cori Navas MD   Insulin Pen Needle ([employer] PEN NEEDLES 31G) 31G X 8 MM MISC 1 each by Does not apply route daily 1/4/21   Jimbo Kendrick MD    MG capsule TAKE 1 CAPSULE BY MOUTH TWICE DAILY 12/9/20   Jimbo Kendrick MD   traZODone (DESYREL) 150 MG tablet Take 1 tablet by mouth nightly  Patient taking differently: Take 200 mg by mouth nightly  11/18/20   MD STEFANY WeberSTYLE LITE strip 1 each by Does not apply route 3 times daily 11/11/20   MD Stefany WeberStyle Lancets MISC 1 each by Does not apply route 3 times daily 11/11/20   Jimbo Kendrick MD   albuterol sulfate  (90 Base) MCG/ACT inhaler Inhale 2 puffs into the lungs every 4 hours as needed for Wheezing 10/20/20 9/22/21  Jimbo Kendrick MD   FLOVENT  MCG/ACT inhaler Inhale 2 puffs into the lungs 2 times daily  Patient not taking: Reported on 9/22/2021 10/20/20   Jimbo Kendrick MD   budesonide-formoterol (SYMBICORT) 80-4.5 MCG/ACT AERO Inhale 2 puffs into the lungs 2 times daily 10/20/20   Jimbo Kendrick MD       ALLERGIES:     Bactrim [sulfamethoxazole-trimethoprim] and Adhesive tape      OBJECTIVE:       Vitals:    10/08/21 0835 10/08/21 1506 10/08/21 2052 10/09/21 0722   BP: (!) 157/87  (!) 161/85 (!) 153/88   Pulse: 74  73 88   Resp: 15  18 18   Temp: 97.3 °F %    Platelets 874 071 - 786 k/uL    MPV 10.4 8.1 - 13.5 fL    NRBC Automated 0.0 0.0 per 100 WBC    Differential Type NOT REPORTED     WBC Morphology NOT REPORTED     RBC Morphology NOT REPORTED     Platelet Estimate NOT REPORTED     Seg Neutrophils 31 (L) 36 - 65 %    Lymphocytes 44 (H) 24 - 43 %    Monocytes 16 (H) 3 - 12 %    Eosinophils % 8 (H) 1 - 4 %    Basophils 1 0 - 2 %    Immature Granulocytes 1 (H) 0 %    Segs Absolute 1.27 (L) 1.50 - 8.10 k/uL    Absolute Lymph # 1.81 1.10 - 3.70 k/uL    Absolute Mono # 0.64 0.10 - 1.20 k/uL    Absolute Eos # 0.33 0.00 - 0.44 k/uL    Basophils Absolute 0.03 0.00 - 0.20 k/uL    Absolute Immature Granulocyte <0.03 0.00 - 0.30 k/uL   BASIC METABOLIC PANEL    Collection Time: 10/09/21  8:21 AM   Result Value Ref Range    Glucose 159 (H) 70 - 99 mg/dL    BUN 5 (L) 6 - 20 mg/dL    CREATININE 0.63 0.50 - 0.90 mg/dL    Bun/Cre Ratio NOT REPORTED 9 - 20    Calcium 8.8 8.6 - 10.4 mg/dL    Sodium 141 135 - 144 mmol/L    Potassium 3.4 (L) 3.7 - 5.3 mmol/L    Chloride 103 98 - 107 mmol/L    CO2 22 20 - 31 mmol/L    Anion Gap 16 9 - 17 mmol/L    GFR Non-African American >60 >60 mL/min    GFR African American >60 >60 mL/min    GFR Comment          GFR Staging NOT REPORTED    POC Glucose Fingerstick    Collection Time: 10/09/21 11:45 AM   Result Value Ref Range    POC Glucose 279 (H) 65 - 105 mg/dL         Imaging/Diagonstics:    Current Facility-Administered Medications   Medication Dose Route Frequency Provider Last Rate Last Admin    hydrALAZINE (APRESOLINE) injection 5 mg  5 mg IntraVENous Q6H PRN Puma Bae MD        fentaNYL (SUBLIMAZE) injection 50 mcg  50 mcg IntraVENous Q4H PRN Marilin Case MD        sennosides-docusate sodium (SENOKOT-S) 8.6-50 MG tablet 2 tablet  2 tablet Oral Daily PRN Puma Bae MD        famotidine (PEPCID) tablet 20 mg  20 mg Oral BID William Capellan DO        enoxaparin (LOVENOX) injection 30 mg  30 mg SubCUTAneous BID Armand Torres JAMES Agudelo MD        pregabalin (LYRICA) capsule 75 mg  75 mg Oral BID Anna Valencia MD   75 mg at 10/09/21 0828    insulin glargine (LANTUS) injection vial 10 Units  10 Units SubCUTAneous BID Xenia Recinos MD   10 Units at 10/09/21 0829    traZODone (DESYREL) tablet 200 mg  200 mg Oral Nightly Peter Drop, DO   200 mg at 10/08/21 2019    venlafaxine (EFFEXOR XR) extended release capsule 150 mg  150 mg Oral Daily with breakfast Peter Drop, DO   150 mg at 10/09/21 3777    sodium chloride flush 0.9 % injection 5-40 mL  5-40 mL IntraVENous 2 times per day Peter Drop, DO   10 mL at 10/09/21 0234    sodium chloride flush 0.9 % injection 5-40 mL  5-40 mL IntraVENous PRN Peter Drop, DO        0.9 % sodium chloride infusion  25 mL IntraVENous PRN Peter Drop, DO        potassium chloride (KLOR-CON M) extended release tablet 40 mEq  40 mEq Oral PRN Peter Drop, DO        Or    potassium bicarb-citric acid (EFFER-K) effervescent tablet 40 mEq  40 mEq Oral PRN Peter Drop, DO        Or    potassium chloride 10 mEq/100 mL IVPB (Peripheral Line)  10 mEq IntraVENous PRN Peter Drop, DO        ondansetron [facility]) injection 4 mg  4 mg IntraVENous Q4H PRN Peter Drop, DO   4 mg at 10/06/21 2334    polyethylene glycol (GLYCOLAX) packet 17 g  17 g Oral Daily PRN Peter Drop, DO        acetaminophen (TYLENOL) tablet 650 mg  650 mg Oral Q6H PRN Peter Drop, DO   650 mg at 10/08/21 1236    Or    acetaminophen (TYLENOL) suppository 650 mg  650 mg Rectal Q6H PRN Peter Drop, DO        insulin lispro (HUMALOG) injection vial 0-12 Units  0-12 Units SubCUTAneous TID WC Peter Drop, DO   6 Units at 10/09/21 1155    insulin lispro (HUMALOG) injection vial 0-6 Units  0-6 Units SubCUTAneous Nightly Peter Drop, DO   2 Units at 10/08/21 2036    glucose (GLUTOSE) 40 % oral gel 15 g  15 g Oral PRN Peter Drop, DO        dextrose 50 % IV solution  12.5 g IntraVENous PRN Stu Eugenia, DO        glucagon (rDNA) injection 1 mg  1 mg IntraMUSCular PRN Stu Eugenia, DO        dextrose 5 % solution  100 mL/hr IntraVENous PRN Stu Eugenia, DO        ceftaroline fosamil (TEFLARO) 600 mg in dextrose 5 % 50 mL IVPB  600 mg IntraVENous Q12H Maylin Del Toro MD   Stopped at 10/09/21 0942    fluconazole (DIFLUCAN) tablet 400 mg  400 mg Oral Q24H Kiara Eason MD   400 mg at 10/08/21 2019       ASSESSMENT:     Active Problems:    Open wound of right index finger    Adverse effect of COVID-19 vaccine    Wound dehiscence  Resolved Problems:    * No resolved hospital problems. *      PLAN:     Discussed care plan with nurse after getting her input. Wound Dehiscence of right index finger  -Pain control with fentanyl  - Orthopedic surgery on board              - Planning for surgery on Monday with possible first ray amputation. N.p.o. at midnight effective Sunday   -Infectious disease on board              -Continue ceftaroline and Diflucan due to gram-positive cocci as well as Candida noted on previous wound culture. Current wound culture polymicrobial     Bipolar depression   - Continue home Effexor      Diabetes mellitus, type 2   - Medium dose sliding scale   -Lantus 15 units twice daily, reduced from home dose of 20 units twice daily  - hypoglycemia protocol in place      Asthma  - Continue home Flovent  - Continue home Symbicort      DVT prophylaxis: Lovenox 40 mg SC  GI prophylaxis: Famotidine 20 mg BID     Above plan discussed with the patient who agreed to the above plan      Plan will be discussed with the attending, Dr. Rajan Jones MD  Family Medicine Resident  10/9/2021 2:03 PM            Please note that this chart was generated using voice recognition Dragon dictation software.   Although every effort was made to ensure the accuracy of this automated transcription, some errors in transcription may have occurred

## 2021-10-09 NOTE — CARE COORDINATION
10/09/21 1824   Readmission Assessment   Number of Days since last admission? 8-30 days   Previous Disposition Home Alone   Who is being Interviewed Patient   What was the patient's/caregiver's perception as to why they think they needed to return back to the hospital? Other (Comment)  (my finger did not get better)   Did you visit your Primary Care Physician after you left the hospital, before you returned this time? No   Why weren't you able to visit your PCP? Did not have an appointment   Did you see a specialist, such as Cardiac, Pulmonary, Orthopedic Physician, etc. after you left the hospital? No   Who advised the patient to return to the hospital? Self-referral   Does the patient report anything that got in the way of taking their medications? No   In our efforts to provide the best possible care to you and others like you, can you think of anything that we could have done to help you after you left the hospital the first time, so that you might not have needed to return so soon?  Other (Comment)  (I should have stayed on IV ATB longer)

## 2021-10-09 NOTE — PROGRESS NOTES
[facility] -   Infectious diseases evaluation  admission date 10/6/2021    reason for consultation:   Right hand infection    Impression :   Current:  · Right second finger infected soft tissue then osteomyelitis  · Right hand soft tissue infection  · Diabetes on insulin  · SLE    Other:  ·   Discussion / summary of stay / plan of care   ·   Recommendations   · Continue high-dose Diflucan 400 mg a day p.o. and ceftaroline to spare the kidneys and diabetic  · Current pus cx MSSA and kleb S ceftriaoxne  · Past pus cx MR SCN and candida glabrata  · Await amputation by orthopedic on Monday        Infection Control Recommendations   · [place] Precautions      Antimicrobial Stewardship Recommendations   · Simplification of therapy  · Targeted therapy  · IV to oral conversion  · Per Kg dosing  · Restricted antimicrobial use  · Discontinuation of therapy    Coordination ofOutpatient Care:   · Estimated Length of IV antimicrobials:  · Patient will need Midline / picc Catheter Insertion:   · Patient will need SNF:  · Patient will need outpatient wound care:     History of Present Illness:   Initial history:  Roseann Sumner is a 49y.o.-year-old female very familiar to me who was admitted earlier this month to the hospital with a cellulitis and an abscess over the right hand mostly the right second finger, cultures grew gram-positive cocci as well as Candida and the patient was discharged on Diflucan and amoxicillin. The target was soft tissue infection and tendinitis, there was a concern of possible osteomyelitis. The patient demanded amputation before leaving. Ortho had followed her and planned to see her again in amputate the finger if it does not respond to antibiotic therapy. She comes back with the finger still hurting her, has not improved, and was planned to go for amputation of the morning.   Her finger is very stiff the soft tissues are hard, she is not able and confusion. The patient is not nervous/anxious. Physical Examination :       Physical Exam  Constitutional:       Appearance: Normal appearance. HENT:      Head: Normocephalic. Mouth/Throat:      Mouth: Mucous membranes are moist.      Pharynx: Oropharynx is clear. Cardiovascular:      Rate and Rhythm: Normal rate and regular rhythm. Pulses: Normal pulses. Pulmonary:      Effort: Pulmonary effort is normal.      Breath sounds: Normal breath sounds. Abdominal:      Palpations: Abdomen is soft. There is no mass. Tenderness: There is no abdominal tenderness. Skin:     Findings: Lesion present. Comments: Large open wound extending from right second finger to palm. Currently dressed with gauze    Neurological:      General: No focal deficit present. Mental Status: She is alert. Mental status is at baseline.    Psychiatric:         Mood and Affect: Mood normal.         Past Medical History:     Past Medical History:   Diagnosis Date    Acid reflux 5/29/2017    Acute cystitis with hematuria 10/11/2016    Acute non-recurrent maxillary sinusitis 11/28/2017    Asthma     Bipolar 1 disorder ([address] 1/4/2018    Bipolar disorder, mixed ([address]     BMI 34.0-34.9,adult 11/28/2017    Cannabis use disorder, severe, dependence ([address] 12/19/2017    Cerebrovascular accident (CVA) ([address] 6/14/2017    Chest pain 11/5/2016    Chronic renal insufficiency     Cocaine abuse ([address] 1/5/2018    COVID-19 virus RNA test result unknown     DDD (degenerative disc disease), cervical     Diabetes mellitus ([address]     Dizziness 6/13/2017    Fibromyalgia     History of stroke 9/9/2017    Homicidal ideation 11/6/2017    Hyperglycemia     Hypertension     Hypotension 1/18/2019    IDDM (insulin dependent diabetes mellitus) 12/21/2015    Lupus ([address]     Migraine     Neuropathy     Neuropathy     Polysubstance abuse ([address] 9/17/2017    Post traumatic stress disorder (PTSD)     Posttraumatic stress disorder 5/29/2017    Recurrent depression ([facility] 5/29/2017    Recurrent major depressive disorder, in partial remission ([facility] 11/28/2017    Screening mammogram, encounter for 11/28/2017    Severe recurrent major depression with psychotic features ([address] 12/19/2017    Severe recurrent major depression without psychotic features ([address] 12/19/2017    Stroke (cerebrum) ([facility] 6/14/2017    Stroke ([facility]     per chart notes    Suicidal ideation     Suicidal intent 3/10/2017    Vitamin D deficiency 11/28/2017    White matter changes 6/13/2017       Past Surgical  History:     Past Surgical History:   Procedure Laterality Date    ABDOMEN SURGERY      drain tube    ABSCESS DRAINAGE      right buttock    CATARACT REMOVAL WITH IMPLANT Bilateral     CHEST TUBE INSERTION      FINGER AMPUTATION Left 03/13/2021    LEFT RING FINER AMPUTATION performed by Elena Qureshi MD at [address] Right 09/15/2021     I&D INDEX FINGER     HAND SURGERY Right 09/14/2021    I&D INDEX FINGER performed by Elena Qureshi MD at [address] Right 9/15/2021    I&D INDEX FINGER performed by Elena Qureshi MD at [facility] Bilateral        Medications:      enoxaparin  30 mg SubCUTAneous BID    pregabalin  75 mg Oral BID    insulin glargine  10 Units SubCUTAneous BID    traZODone  200 mg Oral Nightly    venlafaxine  150 mg Oral Daily with breakfast    sodium chloride flush  5-40 mL IntraVENous 2 times per day    famotidine (PEPCID) injection  20 mg IntraVENous BID    insulin lispro  0-12 Units SubCUTAneous TID WC    insulin lispro  0-6 Units SubCUTAneous Nightly    ceftaroline fosamil (TEFLARO) IVPB  600 mg IntraVENous Q12H    fluconazole  400 mg Oral Q24H       Social History:     Social History     Socioeconomic History    Marital status: Legally      Spouse name: Not on file    Number of children: Not on file    Years of education: Not on file   [employer] Highest education level: Not on file   Occupational History    Not on file   Tobacco Use    Smoking status: Never Smoker    Smokeless tobacco: Never Used   Vaping Use    Vaping Use: Never used   Substance and Sexual Activity    Alcohol use: Not Currently    Drug use: Yes     Types: Marijuana, Cocaine     Comment: + Cocaine 2/2021 also, see Care Everywhere UDS    Sexual activity: Not Currently     Partners: Male   Other Topics Concern    Not on file   Social History Narrative    Not on file     Social Determinants of Health     Financial Resource Strain: High Risk    Difficulty of Paying Living Expenses: Hard   Food Insecurity: Food Insecurity Present    Worried About Running Out of Food in the Last Year: Often true    Kelin of Food in the Last Year: Often true   Transportation Needs: Unmet Transportation Needs    Lack of Transportation (Medical):  Yes    Lack of Transportation (Non-Medical): Yes   Physical Activity: Inactive    Days of Exercise per Week: 0 days    Minutes of Exercise per Session: 0 min   Stress: Stress Concern Present    Feeling of Stress : Rather much   Social Connections:     Frequency of Communication with Friends and Family:     Frequency of Social Gatherings with Friends and Family:     Attends Taoism Services:     Active Member of Clubs or Organizations:     Attends Club or Organization Meetings:     Marital Status:    Intimate Partner Violence:     Fear of Current or Ex-Partner:     Emotionally Abused:     Physically Abused:     Sexually Abused:        Family History:     Family History   Problem Relation Age of Onset    Diabetes Mother     Stroke Mother     Diabetes Father     Diabetes Sister     Diabetes Brother     Other Son         GSW    No Known Problems Sister       Medical Decision Making:   I have independently reviewed/ordered the following labs:    CBC with Differential:   Recent Labs     10/06/21  1518 10/09/21  0821   WBC 5.5 4.1   HGB 9.7* 11.6* HCT 30.5* 37.1    298   LYMPHOPCT 20* 44*   MONOPCT 10 16*     BMP:  Recent Labs     10/06/21  1518 10/09/21  0821    141   K 3.9 3.4*   CL 99 103   CO2 20 22   BUN 12 5*   CREATININE 0.68 0.63     Hepatic Function Panel:   Recent Labs     10/08/21  1628   PROT 7.8   LABALBU 3.6   BILIDIR <0.08   IBILI CANNOT BE CALCULATED   BILITOT <0.10*   ALKPHOS 110*   ALT 11   AST 16     No results for input(s): RPR in the last 72 hours. No results for input(s): HIV in the last 72 hours. No results for input(s): BC in the last 72 hours. Lab Results   Component Value Date    CREATININE 0.63 10/09/2021    GLUCOSE 159 10/09/2021       Detailed results: Thank you for allowing us to participate in the care of this patient. Please call with questions. This note is created with the assistance of a speech recognition program.  While intending to generate adocument that actually reflects the content of the visit, the document can still have some errors including those of syntax and sound a like substitutions which may escape proof reading. It such instances, actual meaningcan be extrapolated by contextual diversion. Elle Jaramillo  Office: [phone]  Perfect serve / office [phone]        I have discussed the care of the patient, including pertinent history and exam findings,  with the resident. I have seen and examined the patient and the key elements of all parts of the encounter have been performed by me. I agree with the assessment, plan and orders as documented by the resident.     Maylin Puga, Infectious Diseases

## 2021-10-09 NOTE — FLOWSHEET NOTE
SPIRITUAL CARE DEPARTMENT - [facility] [address] [facility]  PROGRESS NOTE    Shift date: 10/9/21  Shift day: Saturday   Shift # 2    Room # 1572/0718-05   Name: Kimberly Moreno            Age: 49 y.o. Gender: female          [place]: [address] of [place]: [address] [place]    Referral: Routine Visit    Admit Date & Time: 10/6/2021  2:33 PM    PATIENT/EVENT DESCRIPTION:  Kimberly Moreno is a 49 y.o. female    received PS for room visit request by patient. SPIRITUAL ASSESSMENT/INTERVENTION:  Patient appeared anxious and coping about the upcoming surgery and the impact it will have on her life.  listened and validated patient feelings and emotions. Patient has strong henry in God and is putting her trust in him for the surgery and beyond.  nurtured hope and offered prayer which patient accepted. SPIRITUAL CARE FOLLOW-UP PLAN:  Chaplains will remain available to offer spiritual and emotional support as needed. Electronically signed by Lino Kaur, on 10/9/2021 at [facility]  [phone]       10/09/21 1812   Encounter Summary   Services provided to: Patient   Referral/Consult From: Nurse   Place of [facility]   ([facility])   Continue Visiting   (10/9/21)   Complexity of Encounter Moderate   Length of Encounter 45 minutes   Spiritual Assessment Completed Yes   Spiritual/Baptism   Type Spiritual support   Assessment Approachable;Tearful; Anxious; Coping   Intervention Discussed illness/injury and it's impact;Prayer;Nurtured hope; Active listening;Explored feelings, thoughts, concerns   Outcome Expressed gratitude;Comfort

## 2021-10-10 ENCOUNTER — ANESTHESIA EVENT (OUTPATIENT)
Dept: OPERATING ROOM | Age: 56
End: 2021-10-10
Payer: COMMERCIAL

## 2021-10-10 LAB
EKG ATRIAL RATE: 69 BPM
EKG P AXIS: 56 DEGREES
EKG P-R INTERVAL: 168 MS
EKG Q-T INTERVAL: 434 MS
EKG QRS DURATION: 92 MS
EKG QTC CALCULATION (BAZETT): 465 MS
EKG R AXIS: -8 DEGREES
EKG T AXIS: 5 DEGREES
EKG VENTRICULAR RATE: 69 BPM
GLUCOSE BLD-MCNC: 160 MG/DL (ref 65–105)
GLUCOSE BLD-MCNC: 222 MG/DL (ref 65–105)
GLUCOSE BLD-MCNC: 231 MG/DL (ref 65–105)
GLUCOSE BLD-MCNC: 239 MG/DL (ref 65–105)

## 2021-10-10 PROCEDURE — 6360000002 HC RX W HCPCS: Performed by: STUDENT IN AN ORGANIZED HEALTH CARE EDUCATION/TRAINING PROGRAM

## 2021-10-10 PROCEDURE — 76937 US GUIDE VASCULAR ACCESS: CPT

## 2021-10-10 PROCEDURE — 6370000000 HC RX 637 (ALT 250 FOR IP): Performed by: INTERNAL MEDICINE

## 2021-10-10 PROCEDURE — 2580000003 HC RX 258: Performed by: STUDENT IN AN ORGANIZED HEALTH CARE EDUCATION/TRAINING PROGRAM

## 2021-10-10 PROCEDURE — 2580000003 HC RX 258: Performed by: INTERNAL MEDICINE

## 2021-10-10 PROCEDURE — 6370000000 HC RX 637 (ALT 250 FOR IP)

## 2021-10-10 PROCEDURE — 6370000000 HC RX 637 (ALT 250 FOR IP): Performed by: STUDENT IN AN ORGANIZED HEALTH CARE EDUCATION/TRAINING PROGRAM

## 2021-10-10 PROCEDURE — 93010 ELECTROCARDIOGRAM REPORT: CPT | Performed by: INTERNAL MEDICINE

## 2021-10-10 PROCEDURE — 6360000002 HC RX W HCPCS: Performed by: INTERNAL MEDICINE

## 2021-10-10 PROCEDURE — 1200000000 HC SEMI PRIVATE

## 2021-10-10 PROCEDURE — 99233 SBSQ HOSP IP/OBS HIGH 50: CPT | Performed by: STUDENT IN AN ORGANIZED HEALTH CARE EDUCATION/TRAINING PROGRAM

## 2021-10-10 PROCEDURE — 82947 ASSAY GLUCOSE BLOOD QUANT: CPT

## 2021-10-10 RX ORDER — CETIRIZINE HYDROCHLORIDE 10 MG/1
10 TABLET ORAL DAILY
Status: DISCONTINUED | OUTPATIENT
Start: 2021-10-10 | End: 2021-10-12

## 2021-10-10 RX ORDER — OXYCODONE HYDROCHLORIDE AND ACETAMINOPHEN 5; 325 MG/1; MG/1
1 TABLET ORAL EVERY 4 HOURS PRN
Status: DISCONTINUED | OUTPATIENT
Start: 2021-10-10 | End: 2021-10-12

## 2021-10-10 RX ORDER — POLYETHYLENE GLYCOL 3350 17 G/17G
17 POWDER, FOR SOLUTION ORAL DAILY
Status: DISCONTINUED | OUTPATIENT
Start: 2021-10-10 | End: 2021-10-12 | Stop reason: HOSPADM

## 2021-10-10 RX ORDER — CALCIUM CARBONATE 200(500)MG
500 TABLET,CHEWABLE ORAL 3 TIMES DAILY PRN
Status: DISCONTINUED | OUTPATIENT
Start: 2021-10-10 | End: 2021-10-12 | Stop reason: HOSPADM

## 2021-10-10 RX ADMIN — SODIUM CHLORIDE, PRESERVATIVE FREE 10 ML: 5 INJECTION INTRAVENOUS at 08:06

## 2021-10-10 RX ADMIN — FENTANYL CITRATE 50 MCG: 50 INJECTION INTRAMUSCULAR; INTRAVENOUS at 12:06

## 2021-10-10 RX ADMIN — VENLAFAXINE HYDROCHLORIDE 150 MG: 150 CAPSULE, EXTENDED RELEASE ORAL at 08:18

## 2021-10-10 RX ADMIN — FENTANYL CITRATE 50 MCG: 50 INJECTION INTRAMUSCULAR; INTRAVENOUS at 20:42

## 2021-10-10 RX ADMIN — FENTANYL CITRATE 50 MCG: 50 INJECTION INTRAMUSCULAR; INTRAVENOUS at 07:30

## 2021-10-10 RX ADMIN — PREGABALIN 75 MG: 75 CAPSULE ORAL at 21:04

## 2021-10-10 RX ADMIN — FENTANYL CITRATE 50 MCG: 50 INJECTION INTRAMUSCULAR; INTRAVENOUS at 16:13

## 2021-10-10 RX ADMIN — DOCUSATE SODIUM 50MG AND SENNOSIDES 8.6MG 2 TABLET: 8.6; 5 TABLET, FILM COATED ORAL at 08:18

## 2021-10-10 RX ADMIN — INSULIN GLARGINE 15 UNITS: 100 INJECTION, SOLUTION SUBCUTANEOUS at 08:21

## 2021-10-10 RX ADMIN — OXYCODONE HYDROCHLORIDE AND ACETAMINOPHEN 1 TABLET: 5; 325 TABLET ORAL at 14:01

## 2021-10-10 RX ADMIN — INSULIN LISPRO 1 UNITS: 100 INJECTION, SOLUTION INTRAVENOUS; SUBCUTANEOUS at 21:07

## 2021-10-10 RX ADMIN — POLYETHYLENE GLYCOL 3350 17 G: 17 POWDER, FOR SOLUTION ORAL at 09:16

## 2021-10-10 RX ADMIN — INSULIN GLARGINE 15 UNITS: 100 INJECTION, SOLUTION SUBCUTANEOUS at 00:17

## 2021-10-10 RX ADMIN — CEFTAROLINE FOSAMIL 600 MG: 600 POWDER, FOR SOLUTION INTRAVENOUS at 08:18

## 2021-10-10 RX ADMIN — FAMOTIDINE 20 MG: 20 TABLET, FILM COATED ORAL at 21:04

## 2021-10-10 RX ADMIN — CETIRIZINE HYDROCHLORIDE 10 MG: 10 TABLET ORAL at 09:50

## 2021-10-10 RX ADMIN — FLUCONAZOLE 400 MG: 200 TABLET ORAL at 21:04

## 2021-10-10 RX ADMIN — INSULIN LISPRO 4 UNITS: 100 INJECTION, SOLUTION INTRAVENOUS; SUBCUTANEOUS at 18:12

## 2021-10-10 RX ADMIN — ANTACID TABLETS 500 MG: 500 TABLET, CHEWABLE ORAL at 13:30

## 2021-10-10 RX ADMIN — INSULIN GLARGINE 15 UNITS: 100 INJECTION, SOLUTION SUBCUTANEOUS at 21:07

## 2021-10-10 RX ADMIN — FENTANYL CITRATE 50 MCG: 50 INJECTION INTRAMUSCULAR; INTRAVENOUS at 00:14

## 2021-10-10 RX ADMIN — FAMOTIDINE 20 MG: 20 TABLET, FILM COATED ORAL at 08:18

## 2021-10-10 RX ADMIN — TRAZODONE HYDROCHLORIDE 200 MG: 100 TABLET ORAL at 21:03

## 2021-10-10 RX ADMIN — INSULIN LISPRO 4 UNITS: 100 INJECTION, SOLUTION INTRAVENOUS; SUBCUTANEOUS at 12:06

## 2021-10-10 RX ADMIN — CEFTAROLINE FOSAMIL 600 MG: 600 POWDER, FOR SOLUTION INTRAVENOUS at 21:52

## 2021-10-10 RX ADMIN — PREGABALIN 75 MG: 75 CAPSULE ORAL at 08:18

## 2021-10-10 RX ADMIN — INSULIN LISPRO 4 UNITS: 100 INJECTION, SOLUTION INTRAVENOUS; SUBCUTANEOUS at 08:18

## 2021-10-10 ASSESSMENT — PAIN SCALES - GENERAL
PAINLEVEL_OUTOF10: 6
PAINLEVEL_OUTOF10: 10
PAINLEVEL_OUTOF10: 8
PAINLEVEL_OUTOF10: 10
PAINLEVEL_OUTOF10: 8
PAINLEVEL_OUTOF10: 10
PAINLEVEL_OUTOF10: 10
PAINLEVEL_OUTOF10: 9
PAINLEVEL_OUTOF10: 10
PAINLEVEL_OUTOF10: 6

## 2021-10-10 NOTE — PROGRESS NOTES
[address] [facility]  Progress Note    Date:   10/10/2021  Patient name:  Andie Juarez  Date of admission:  10/6/2021  2:33 PM  MRN:   [id number]  YOB: 1965    SUBJECTIVE/Last 24 hours update:     Patient seen and examined at bed side , no new acute events overnight. Pain is well controlled. Patient is doing well, has some constipation, will give glycolax. Patient is nervous about surgery on Monday and tearful that she doesn't have any family here to support her. Assured patient that she can call us if she has questions or is feeling anxious about the surgery. Notes from nursing staff and Consults had been reviewed, and the overnight progress had been checked with the nursing staff as well.     HPI:   See H&P    REVIEW OF SYSTEMS:      CONSTITUTIONAL:  no fevers, ocassional headcahes  EYES: negative for blury vision  HEENT:No nasal congestion, no difficulty swallowing  RESPIRATORY:negative for dyspnea, no wheezing, no Cough  CARDIOVASCULAR: negative for chest pain, no palpitations  GASTROINTESTINAL: no nausea, no vomiting, no change in bowel habits, no abdominal pain   GENITOURINARY: negative for dysuria, no hematuria   NEUROLOGICAL: No  Weakness or numbness    PAST MEDICAL HISTORY:      has a past medical history of Acid reflux, Acute cystitis with hematuria, Acute non-recurrent maxillary sinusitis, Asthma, Bipolar 1 disorder ([address] Bipolar disorder, mixed ([address] BMI 34.0-34.9,adult, Cannabis use disorder, severe, dependence ([address] Cerebrovascular accident (CVA) ([address] Chest pain, Chronic renal insufficiency, Cocaine abuse ([address] COVID-19 virus RNA test result unknown, DDD (degenerative disc disease), cervical, Diabetes mellitus ([address] Dizziness, Fibromyalgia, History of stroke, Homicidal ideation, Hyperglycemia, Hypertension, Hypotension, IDDM (insulin dependent diabetes mellitus), Lupus ([address] Migraine, Neuropathy, Neuropathy, Polysubstance abuse ([facility] Post traumatic stress disorder (PTSD), Posttraumatic stress disorder, Recurrent depression ([facility] Recurrent major depressive disorder, in partial remission ([facility] Screening mammogram, encounter for, Severe recurrent major depression with psychotic features ([facility] Severe recurrent major depression without psychotic features ([address] Stroke (cerebrum) ([facility] Stroke ([facility] Suicidal ideation, Suicidal intent, Vitamin D deficiency, and White matter changes. PAST SURGICAL HISTORY:      has a past surgical history that includes Abscess Drainage; chest tube insertion; Abdomen surgery; Cataract removal with implant (Bilateral); LASIK (Bilateral); Finger amputation (Left, 03/13/2021); Hand surgery (Right, 09/14/2021); Finger surgery (Right, 09/15/2021); and Hand surgery (Right, 9/15/2021). SOCIAL HISTORY:      reports that she has never smoked. She has never used smokeless tobacco. She reports previous alcohol use. She reports current drug use. Drugs: Marijuana and Cocaine. FAMILY HISTORY:     family history includes Diabetes in her brother, father, mother, and sister; No Known Problems in her sister; Other in her son; Stroke in her mother. HOME MEDICATIONS:      Prior to Admission medications    Medication Sig Start Date End Date Taking? Authorizing Provider   amoxicillin (AMOXIL) 500 MG capsule Take 2 capsules by mouth 2 times daily for 21 days 9/24/21 10/15/21  Maylin Araujo MD   fluconazole (DIFLUCAN) 200 MG tablet Take 2 tablets by mouth daily for 21 days Till 10/11/21 - 21 days  Watch LFT 2 x per week 9/20/21 10/11/21  Maylin Ann MD   cetirizine (ZYRTEC) 10 MG tablet Take 1 tablet by mouth daily 8/9/21   Jose Macedo MD   insulin glargine (LANTUS SOLOSTAR) 100 UNIT/ML injection pen Inject 20 Units into the skin 2 times daily 8/9/21   Jose Macedo MD   pregabalin (LYRICA) 75 MG capsule Take 1 capsule by mouth 2 times daily for 30 days.   Patient taking differently: Take 100 mg by mouth Height:           No intake or output data in the 24 hours ending 10/10/21 0847    PHYSICAL EXAM:  General Appearance  Alert , awake , not in acute distress  HEENT - Head is normocephalic. Lungs - Bilateral equal air entry , no wheezes, rales or rhonchi, aeration good  Cardiovascular - Heart sounds are normal.  Regular rhythm, normal rate without murmur, gallop or rub.   Abdomen - Soft, nontender, nondistended, no masses or organomegaly  Neurologic - There are no new focal motor or sensory deficits  Skin - No bruising, right index finger is wrapped in gaze  Extremities - No cyanosis, clubbing or edema      DIAGNOSTICS:     Laboratory Testing:    Recent Results (from the past 24 hour(s))   POC Glucose Fingerstick    Collection Time: 10/09/21 11:45 AM   Result Value Ref Range    POC Glucose 279 (H) 65 - 105 mg/dL   POC Glucose Fingerstick    Collection Time: 10/09/21  4:35 PM   Result Value Ref Range    POC Glucose 229 (H) 65 - 105 mg/dL   POC Glucose Fingerstick    Collection Time: 10/09/21  9:03 PM   Result Value Ref Range    POC Glucose 114 (H) 65 - 105 mg/dL   POC Glucose Fingerstick    Collection Time: 10/10/21  7:28 AM   Result Value Ref Range    POC Glucose 222 (H) 65 - 105 mg/dL       Current Facility-Administered Medications   Medication Dose Route Frequency Provider Last Rate Last Admin    cetirizine (ZYRTEC) tablet 10 mg  10 mg Oral Daily Ronny Flores MD        polyethylene glycol (GLYCOLAX) packet 17 g  17 g Oral Daily Juliano Ba MD        hydrALAZINE (APRESOLINE) injection 5 mg  5 mg IntraVENous Q6H PRN Puma Bae MD        fentaNYL (SUBLIMAZE) injection 50 mcg  50 mcg IntraVENous Q4H PRN Donato Alfredo MD   50 mcg at 10/10/21 0730    sennosides-docusate sodium (SENOKOT-S) 8.6-50 MG tablet 2 tablet  2 tablet Oral Daily PRN Donato Alfredo MD   2 tablet at 10/10/21 0818    famotidine (PEPCID) tablet 20 mg  20 mg Oral BID Cy Porteous, DO   20 mg at 10/10/21 0818    insulin glargine (LANTUS) injection vial 15 Units  15 Units SubCUTAneous BID Damian Whipple MD   15 Units at 10/10/21 0821    enoxaparin (LOVENOX) injection 30 mg  30 mg SubCUTAneous BID Charity Hay MD        pregabalin (LYRICA) capsule 75 mg  75 mg Oral BID Charity Hay MD   75 mg at 10/10/21 0818    traZODone (DESYREL) tablet 200 mg  200 mg Oral Nightly Martina Stanley, DO   200 mg at 10/09/21 2009    venlafaxine (EFFEXOR XR) extended release capsule 150 mg  150 mg Oral Daily with breakfast Martina Stanley, DO   150 mg at 10/10/21 0818    sodium chloride flush 0.9 % injection 5-40 mL  5-40 mL IntraVENous 2 times per day Martina Stanley, DO   10 mL at 10/10/21 0806    sodium chloride flush 0.9 % injection 5-40 mL  5-40 mL IntraVENous PRN Martina Stanley, DO        0.9 % sodium chloride infusion  25 mL IntraVENous PRN Martina Stanley, DO        potassium chloride (KLOR-CON M) extended release tablet 40 mEq  40 mEq Oral PRN Martina Stanley, DO        Or    potassium bicarb-citric acid (EFFER-K) effervescent tablet 40 mEq  40 mEq Oral PRN Martina Stanley, DO        Or    potassium chloride 10 mEq/100 mL IVPB (Peripheral Line)  10 mEq IntraVENous PRN Martina Stanley, DO        ondansetron [facility]) injection 4 mg  4 mg IntraVENous Q4H PRN Martina Stanley, DO   4 mg at 10/06/21 2334    polyethylene glycol (GLYCOLAX) packet 17 g  17 g Oral Daily PRN Martina Stanley, DO        acetaminophen (TYLENOL) tablet 650 mg  650 mg Oral Q6H PRN Martina Stanley, DO   650 mg at 10/08/21 1236    Or    acetaminophen (TYLENOL) suppository 650 mg  650 mg Rectal Q6H PRN Martina Tsanley, DO        insulin lispro (HUMALOG) injection vial 0-12 Units  0-12 Units SubCUTAneous TID WC Martina Stanley, DO   4 Units at 10/10/21 0818    insulin lispro (HUMALOG) injection vial 0-6 Units  0-6 Units SubCUTAneous Nightly Martina Stanley, DO   2 Units at 10/08/21 2036    glucose (GLUTOSE) 40 % oral gel 15 g  15 g Oral PRN Laly Puentes, DO        dextrose 50 % IV solution  12.5 g IntraVENous PRN Gerold Dhaval, DO        glucagon (rDNA) injection 1 mg  1 mg IntraMUSCular PRN Laly Puentes, DO        dextrose 5 % solution  100 mL/hr IntraVENous PRN Laly Puentes, DO        ceftaroline fosamil (TEFLARO) 600 mg in dextrose 5 % 50 mL IVPB  600 mg IntraVENous Q12H Marianne Iqbal MD 50 mL/hr at 10/10/21 0818 600 mg at 10/10/21 0818    fluconazole (DIFLUCAN) tablet 400 mg  400 mg Oral Q24H Maylin Connor MD   400 mg at 10/09/21 2008       ASSESSMENT:     Active Problems:    Open wound of right index finger    Adverse effect of COVID-19 vaccine    Wound dehiscence  Resolved Problems:    * No resolved hospital problems. *      PLAN:     Discussed care plan with nurse after getting her input. Wound Dehiscence of right index finger  -Pain control with fentanyl  - Orthopedic surgery on board              - Planning for surgery on Monday with possible first finger amputation. N.p.o. at midnight effective Sunday              -Infectious disease on board              -Continue ceftaroline and Diflucan due to gram-positive cocci as well as Candida noted on previous wound culture. Current wound culture polymicrobial     Bipolar depression   - Continue home Effexor      Diabetes mellitus, type 2   - Medium dose sliding scale   - Lantus 15 units twice daily, reduced from home dose of 20 units twice daily  - hypoglycemia protocol in place      Asthma  - Continue home Flovent  - Continue home Symbicort      DVT prophylaxis: Lovenox 40 mg SC  GI prophylaxis: Famotidine 20 mg BID     Above plan discussed with the patient who agreed to the above plan      Plan will be discussed with the attending, Dr. Noemy Reinoso MD  Family Medicine Resident  10/10/2021 8:47 AM            Please note that this chart was generated using voice recognition Dragon dictation software.   Although every effort was

## 2021-10-10 NOTE — ANESTHESIA PRE PROCEDURE
Department of Anesthesiology  Preprocedure Note       Name:  Dnany Pelayo   Age:  49 y.o.  :  1965                                          MRN:  [id number]         Date:  10/11/2021      Surgeon: Orin Dooley):  Hilario Myers MD    Procedure: Procedure(s):  INDEX FINGER AMPUTATION    Medications prior to admission:   Prior to Admission medications    Medication Sig Start Date End Date Taking? Authorizing Provider   amoxicillin (AMOXIL) 500 MG capsule Take 2 capsules by mouth 2 times daily for 21 days 9/24/21 10/15/21  Maylin Orozco MD   fluconazole (DIFLUCAN) 200 MG tablet Take 2 tablets by mouth daily for 21 days Till 10/11/21 - 21 days  Watch LFT 2 x per week 9/20/21 10/11/21  Maylin Del Toro MD   cetirizine (ZYRTEC) 10 MG tablet Take 1 tablet by mouth daily 21   Jose Macedo MD   insulin glargine (LANTUS SOLOSTAR) 100 UNIT/ML injection pen Inject 20 Units into the skin 2 times daily 21   Jose Maceod MD   pregabalin (LYRICA) 75 MG capsule Take 1 capsule by mouth 2 times daily for 30 days. Patient taking differently: Take 100 mg by mouth 2 times daily.   21  Jayna Garcia MD   pantoprazole (PROTONIX) 20 MG tablet Take 1 tablet by mouth 2 times daily (before meals) 21   Mishel Miller MD   metFORMIN (GLUCOPHAGE) 1000 MG tablet Take 1 tablet by mouth 2 times daily (with meals) 21   Mishel Miller MD   venlafaxine (EFFEXOR XR) 150 MG extended release capsule Take 1 capsule by mouth daily (with breakfast) 21   Mishel Miller MD   dicyclomine (BENTYL) 10 MG capsule Take 1 capsule by mouth 4 times daily 21   Melissa Parr MD   Insulin Pen Needle ([employer] PEN NEEDLES 31G) 31G X 8 MM MISC 1 each by Does not apply route daily 21   Mishel Miller MD    MG capsule TAKE 1 CAPSULE BY MOUTH TWICE DAILY 20   Mishel Miller MD   traZODone (DESYREL) 150 MG tablet Take 1 tablet by mouth nightly  Patient taking differently: Take 200 mg by mouth nightly 11/18/20   MD TATUM Fitzpatrick LITE strip 1 each by Does not apply route 3 times daily 11/11/20   MD Tatum Fitzpatrick Lancets MISC 1 each by Does not apply route 3 times daily 11/11/20   Nancy Street MD   albuterol sulfate  (90 Base) MCG/ACT inhaler Inhale 2 puffs into the lungs every 4 hours as needed for Wheezing 10/20/20 9/22/21  Nancy Street MD   FLOVENT  MCG/ACT inhaler Inhale 2 puffs into the lungs 2 times daily  Patient not taking: Reported on 9/22/2021 10/20/20   Nancy Street MD   budesonide-formoterol (SYMBICORT) 80-4.5 MCG/ACT AERO Inhale 2 puffs into the lungs 2 times daily 10/20/20   Nancy Street MD       Current medications:    Current Facility-Administered Medications   Medication Dose Route Frequency Provider Last Rate Last Admin    cetirizine (ZYRTEC) tablet 10 mg  10 mg Oral Daily Jesusita Adams MD   10 mg at 10/11/21 0944    polyethylene glycol (GLYCOLAX) packet 17 g  17 g Oral Daily Jesusita Adams MD   17 g at 10/10/21 0916    calcium carbonate (TUMS) chewable tablet 500 mg  500 mg Oral TID PRN Charity Hay MD   500 mg at 10/10/21 1330    oxyCODONE-acetaminophen (PERCOCET) 5-325 MG per tablet 1 tablet  1 tablet Oral Q4H PRN Charity Hay MD   1 tablet at 10/10/21 1401    hydrALAZINE (APRESOLINE) injection 5 mg  5 mg IntraVENous Q6H PRN Nancy Street MD        fentaNYL (SUBLIMAZE) injection 50 mcg  50 mcg IntraVENous Q4H PRN Nancy Street MD   50 mcg at 10/11/21 0737    sennosides-docusate sodium (SENOKOT-S) 8.6-50 MG tablet 2 tablet  2 tablet Oral Daily PRN Nancy Street MD   2 tablet at 10/10/21 0818    famotidine (PEPCID) tablet 20 mg  20 mg Oral BID Ginger Stanley, DO   20 mg at 10/11/21 0944    insulin glargine (LANTUS) injection vial 15 Units  15 Units SubCUTAneous BID Nancy Street MD   15 Units at 10/10/21 2107    enoxaparin (LOVENOX) injection 30 mg  30 mg SubCUTAneous BID Mariam Agudelo, MD        pregabalin (LYRICA) capsule 75 mg  75 mg Oral BID Lord Rayna MD   75 mg at 10/11/21 0944    traZODone (DESYREL) tablet 200 mg  200 mg Oral Nightly Liza Brandon, DO   200 mg at 10/10/21 2103    venlafaxine (EFFEXOR XR) extended release capsule 150 mg  150 mg Oral Daily with breakfast Liza Brandon, DO   150 mg at 10/11/21 0944    sodium chloride flush 0.9 % injection 5-40 mL  5-40 mL IntraVENous 2 times per day Liza Brandon, DO   10 mL at 10/11/21 0945    sodium chloride flush 0.9 % injection 5-40 mL  5-40 mL IntraVENous PRN Liza Brandon, DO        0.9 % sodium chloride infusion  25 mL IntraVENous PRN Liza Brandon, DO        potassium chloride (KLOR-CON M) extended release tablet 40 mEq  40 mEq Oral PRN Liza Brandon, DO        Or    potassium bicarb-citric acid (EFFER-K) effervescent tablet 40 mEq  40 mEq Oral PRN Liza Brandon, DO        Or    potassium chloride 10 mEq/100 mL IVPB (Peripheral Line)  10 mEq IntraVENous PRN Liza Brandon, DO        ondansetron [facility]) injection 4 mg  4 mg IntraVENous Q4H PRN Liza Brandon, DO   4 mg at 10/06/21 2334    acetaminophen (TYLENOL) tablet 650 mg  650 mg Oral Q6H PRN Liza Brandon, DO   650 mg at 10/08/21 1236    Or    acetaminophen (TYLENOL) suppository 650 mg  650 mg Rectal Q6H PRN Liza Brandon, DO        insulin lispro (HUMALOG) injection vial 0-12 Units  0-12 Units SubCUTAneous TID WC Liza Brandon, DO   4 Units at 10/10/21 1812    insulin lispro (HUMALOG) injection vial 0-6 Units  0-6 Units SubCUTAneous Nightly Liza Brandon, DO   1 Units at 10/10/21 2107    glucose (GLUTOSE) 40 % oral gel 15 g  15 g Oral PRN Liza Brandon, DO        dextrose 50 % IV solution  12.5 g IntraVENous PRN Liza Brandon, DO        glucagon (rDNA) injection 1 mg  1 mg IntraMUSCular PRN Liza Brandon, DO        dextrose 5 % solution  100 mL/hr IntraVENous PRN Liza Brandon, DO        ceftaroline fosamil (TEFLARO) 600 mg in dextrose 5 % 50 mL IVPB  600 mg IntraVENous Q12H Castillo Kay MD 50 mL/hr at 10/11/21 0939 600 mg at 10/11/21 0939    fluconazole (DIFLUCAN) tablet 400 mg  400 mg Oral Q24H Castillo Kay MD   400 mg at 10/10/21 2104       Allergies: Allergies   Allergen Reactions    Bactrim [Sulfamethoxazole-Trimethoprim] Swelling    Adhesive Tape Rash       Problem List:    Patient Active Problem List   Diagnosis Code    IDDM (insulin dependent diabetes mellitus) [id number]    Lupus ([facility] M32.9    Bipolar disorder, mixed [facility] F31.60    Post traumatic stress disorder (PTSD) F43.10    Acute cystitis with hematuria N30.01    Hyperglycemia R73.9    Suicidal ideation R45.851    Arthritis M19.90    Chronic back pain M54.9, G89.29    Acid reflux K21.9    History of stroke Z86.73    Polysubstance abuse ([facility] F19.10    Bipolar 1 disorder ([facility]) F31.9    Cocaine abuse ([facility] F14.10    Chest pain R07.9    Acute non-recurrent maxillary sinusitis J01.00    Acute respiratory failure with hypercapnia ([facility]) J96.02    Alcohol use disorder, severe, dependence ([facility]) F10.20    Asthma exacerbation attacks J45. 901    Bilateral edema of lower extremity R60.0    Bipolar 1 disorder, depressed, severe (HCC) F31.4    BMI 34.0-34.9,adult Z68.34    Cannabis use disorder, severe, dependence (HCC) F12.20    Cocaine use disorder, severe, dependence ([facility]) F14.20    Dizziness R42    Dyslipidemia E78.5    Family history of colon cancer Z80.0    History of lupus [id number]    Homicidal ideation R45.850    Hypotension I95.9    Neuropathy G62.9    Normocytic anemia D64.9    Recurrent depression (HCC) F33.9    Recurrent major depressive disorder, in partial remission (HCC) F33.41    Severe recurrent major depression with psychotic features ([facility] F33.3    Severe recurrent major depression without psychotic features ([facility] F33.2    Upper GI bleed K92.2    Vitamin D deficiency E55.9    White matter changes [id number]    Gastroesophageal reflux disease K21.9    Stroke (cerebrum) ([facility]) I63.9    Rib lesion M89.9    Diabetes mellitus type 2 in obese ([facility]) E11.69, E66.9    YAEL (generalized anxiety disorder) F41.1    Posttraumatic stress disorder F43.10    Suicidal intent R45.851    Leg weakness, bilateral R29.898    Poorly controlled diabetes mellitus ([facility]) E11.65    Acute kidney injury ([facility]) N17.9    Felon of finger L03.019    Osteomyelitis ([facility]) M86.9    Recurrent falls R29.6    Seasonal allergic rhinitis J30.2    Fibromyalgia M79.7    Tenosynovitis of finger M65.9    DKA (diabetic ketoacidoses) E11.10    Open wound of right index finger S61.200A    Adverse effect of COVID-19 vaccine T50. B95A    Wound dehiscence T81.30XA       Past Medical History:        Diagnosis Date    Acid reflux 5/29/2017    Acute cystitis with hematuria 10/11/2016    Acute non-recurrent maxillary sinusitis 11/28/2017    Asthma     Bipolar 1 disorder ([address] 1/4/2018    Bipolar disorder, mixed ([address]     BMI 34.0-34.9,adult 11/28/2017    Cannabis use disorder, severe, dependence ([address] 12/19/2017    Cerebrovascular accident (CVA) ([address] 6/14/2017    Chest pain 11/5/2016    Chronic renal insufficiency     Cocaine abuse ([address] 1/5/2018    COVID-19 virus RNA test result unknown     DDD (degenerative disc disease), cervical     Diabetes mellitus ([address]     Dizziness 6/13/2017    Fibromyalgia     History of stroke 9/9/2017    Homicidal ideation 11/6/2017    Hyperglycemia     Hypertension     Hypotension 1/18/2019    IDDM (insulin dependent diabetes mellitus) 12/21/2015    Lupus ([address]     Migraine     Neuropathy     Neuropathy     Polysubstance abuse ([address] 9/17/2017    Post traumatic stress disorder (PTSD)     Posttraumatic stress disorder 5/29/2017    Recurrent depression ([address] 5/29/2017    Recurrent major depressive disorder, in partial remission ([address] 11/28/2017    Screening mammogram, encounter for 11/28/2017    Severe recurrent major depression with psychotic features ([facility] 12/19/2017    Severe recurrent major depression without psychotic features ([facility] 12/19/2017    Stroke (cerebrum) ([facility] 6/14/2017    St[facility]     per chart notes    Suicidal ideation     Suicidal intent 3/10/2017    Vitamin D deficiency 11/28/2017    White matter changes 6/13/2017       Past Surgical History:        Procedure Laterality Date    ABDOMEN SURGERY      drain tube    ABSCESS DRAINAGE      right buttock    CATARACT REMOVAL WITH IMPLANT Bilateral     CHEST TUBE INSERTION      FINGER AMPUTATION Left 03/13/2021    LEFT RING FINER AMPUTATION performed by Gagandeep Bah MD at [address] Right 09/15/2021     I&D INDEX FINGER     HAND SURGERY Right 09/14/2021    I&D INDEX FINGER performed by Gagandeep Bah MD at [address] Right 9/15/2021    I&D INDEX FINGER performed by Gagandeep Bah MD at [address] LASIK Bilateral        Social History:    Social History     Tobacco Use    Smoking status: Never Smoker    Smokeless tobacco: Never Used   Substance Use Topics    Alcohol use: Not Currently                                Counseling given: Not Answered      Vital Signs (Current):   Vitals:    10/09/21 2121 10/10/21 0735 10/10/21 1858 10/11/21 0748   BP: 139/89 132/88 (!) 144/91 129/76   Pulse: 69 74 77 70   Resp: 18 16 18 18   Temp: 97 °F (36.1 °C) 97.2 °F (36.2 °C) 97.2 °F (36.2 °C) 97.3 °F (36.3 °C)   TempSrc: Oral Oral Oral Oral   SpO2: 97% 94% 97% 95%   Weight:       Height:                                                  BP Readings from Last 3 Encounters:   10/11/21 129/76   09/24/21 122/80   09/15/21 (!) 88/48       NPO Status:                                                                                 BMI:   Wt Readings from Last 3 Encounters:   10/06/21 230 lb (104.3 kg)   09/21/21 236 lb 12.8 oz (107.4 kg)   08/18/21 238 lb (108 kg)     Body mass index is 37.12 kg/m².     CBC:   Lab Results   Component Value Date    WBC 5.6 10/11/2021    RBC 3.24 10/11/2021    HGB 10.2 10/11/2021    HCT 32.5 10/11/2021    .3 10/11/2021    RDW 12.9 10/11/2021     10/11/2021       CMP:   Lab Results   Component Value Date     10/11/2021    K 3.7 10/11/2021     10/11/2021    CO2 27 10/11/2021    BUN 11 10/11/2021    CREATININE 0.73 10/11/2021    GFRAA >60 10/11/2021    LABGLOM >60 10/11/2021    GLUCOSE 107 10/11/2021    PROT 7.8 10/08/2021    CALCIUM 8.9 10/11/2021    BILITOT <0.10 10/08/2021    ALKPHOS 110 10/08/2021    AST 16 10/08/2021    ALT 11 10/08/2021       POC Tests:   Recent Labs     10/11/21  0740   POCGLU 102       Coags:   Lab Results   Component Value Date    APTT 22.4 07/07/2020       HCG (If Applicable): No results found for: PREGTESTUR, PREGSERUM, HCG, HCGQUANT     ABGs: No results found for: PHART, PO2ART, SQB7KZG, RYX2IQV, BEART, M0YKEIRJ     Type & Screen (If Applicable):  No results found for: LABABO, LABRH    Drug/Infectious Status (If Applicable):  Lab Results   Component Value Date    HEPCAB NONREACTIVE 09/14/2021       COVID-19 Screening (If Applicable):   Lab Results   Component Value Date    COVID19 Not Detected 10/06/2021           Anesthesia Evaluation  Patient summary reviewed and Nursing notes reviewed no history of anesthetic complications:   Airway: Mallampati: III  TM distance: >3 FB   Neck ROM: full  Mouth opening: > = 3 FB Dental:          Pulmonary:normal exam    (+) asthma: seasonal asthma,                            Cardiovascular:    (+) hypertension: no interval change,       ECG reviewed  Rhythm: regular  Rate: normal                    Neuro/Psych:   (+) CVA: no interval change, neuromuscular disease:, headaches: migraine headaches, psychiatric history:depression/anxiety             GI/Hepatic/Renal:   (+) GERD: no interval change,           Endo/Other:    (+) DiabetesType II DM, using insulin, : arthritis: OA., .                 Abdominal:   (+) obese,           Vascular: negative vascular ROS. Other Findings:               Anesthesia Plan      general     ASA 3       Induction: intravenous. Anesthetic plan and risks discussed with patient. Plan discussed with CRNA and attending. Narrative & Impression    Normal sinus rhythm  Normal ECG  When compared with ECG of 07-OCT-2021 06:35,  No significant change was found      Specimen Collected: 10/08/21 10:41          Per Family [facility]  Wound Dehiscence of right index finger  - Pain control with fentanyl  - Plastic surgery on board              - Surgery today, repeat I&D with possible first finger amputation.                 - NPO  -Infectious disease on board              -Continue ceftaroline and Diflucan due to gram-positive cocci as well as Candida noted on previous wound culture.  Current wound culture polymicrobial     Bipolar depression   - Continue home Effexor      Diabetes mellitus, type 2   - Medium dose sliding scale   - Lantus 15 units twice daily, reduced from home dose of 20 units twice daily  - hypoglycemia protocol in place   - Glucose 107      Asthma  - Continue home Flovent  - Continue home Symbicort       Ashlyn Beaulieu MD   10/11/2021

## 2021-10-10 NOTE — FLOWSHEET NOTE
Assessment;  Patient is a 49year old female in room 5. Patient is scheduled to have a finger amputated on Monday. Intervention;  was ministry of presence.  prayed for patient. Outcome:  Patient expressed gratitude. Patient expressed strong henry in God. Plan:  Chaplains will remain available for spiritual and emotional support.      10/09/21 9046   Encounter Summary   Services provided to: Patient   Referral/Consult From: Other    Support System Children   Continue Visiting   (10/9/2021)   Complexity of Encounter Moderate   Length of Encounter 30 minutes   Spiritual Assessment Completed Yes   Spiritual/Sabianist   Type Spiritual support

## 2021-10-11 ENCOUNTER — ANESTHESIA (OUTPATIENT)
Dept: OPERATING ROOM | Age: 56
End: 2021-10-11
Payer: COMMERCIAL

## 2021-10-11 VITALS — OXYGEN SATURATION: 100 % | SYSTOLIC BLOOD PRESSURE: 181 MMHG | DIASTOLIC BLOOD PRESSURE: 123 MMHG | TEMPERATURE: 97.4 F

## 2021-10-11 LAB
ABSOLUTE EOS #: 0.22 K/UL (ref 0–0.4)
ABSOLUTE IMMATURE GRANULOCYTE: 0.06 K/UL (ref 0–0.3)
ABSOLUTE LYMPH #: 3.14 K/UL (ref 1–4.8)
ABSOLUTE MONO #: 0.73 K/UL (ref 0.1–0.8)
ANION GAP SERPL CALCULATED.3IONS-SCNC: 11 MMOL/L (ref 9–17)
BASOPHILS # BLD: 2 % (ref 0–2)
BASOPHILS ABSOLUTE: 0.11 K/UL (ref 0–0.2)
BUN BLDV-MCNC: 11 MG/DL (ref 6–20)
BUN/CREAT BLD: ABNORMAL (ref 9–20)
CALCIUM SERPL-MCNC: 8.9 MG/DL (ref 8.6–10.4)
CHLORIDE BLD-SCNC: 103 MMOL/L (ref 98–107)
CO2: 27 MMOL/L (ref 20–31)
CREAT SERPL-MCNC: 0.73 MG/DL (ref 0.5–0.9)
DIFFERENTIAL TYPE: ABNORMAL
EOSINOPHILS RELATIVE PERCENT: 4 % (ref 1–4)
GFR AFRICAN AMERICAN: >60 ML/MIN
GFR NON-AFRICAN AMERICAN: >60 ML/MIN
GFR SERPL CREATININE-BSD FRML MDRD: ABNORMAL ML/MIN/{1.73_M2}
GFR SERPL CREATININE-BSD FRML MDRD: ABNORMAL ML/MIN/{1.73_M2}
GLUCOSE BLD-MCNC: 102 MG/DL (ref 65–105)
GLUCOSE BLD-MCNC: 107 MG/DL (ref 70–99)
GLUCOSE BLD-MCNC: 402 MG/DL (ref 65–105)
GLUCOSE BLD-MCNC: 428 MG/DL (ref 65–105)
GLUCOSE BLD-MCNC: 77 MG/DL (ref 65–105)
HCT VFR BLD CALC: 32.5 % (ref 36.3–47.1)
HEMOGLOBIN: 10.2 G/DL (ref 11.9–15.1)
IMMATURE GRANULOCYTES: 1 %
LYMPHOCYTES # BLD: 56 % (ref 24–44)
MCH RBC QN AUTO: 31.5 PG (ref 25.2–33.5)
MCHC RBC AUTO-ENTMCNC: 31.4 G/DL (ref 28.4–34.8)
MCV RBC AUTO: 100.3 FL (ref 82.6–102.9)
MONOCYTES # BLD: 13 % (ref 1–7)
MORPHOLOGY: NORMAL
NRBC AUTOMATED: 0 PER 100 WBC
PDW BLD-RTO: 12.9 % (ref 11.8–14.4)
PLATELET # BLD: 280 K/UL (ref 138–453)
PLATELET ESTIMATE: ABNORMAL
PMV BLD AUTO: 10.1 FL (ref 8.1–13.5)
POTASSIUM SERPL-SCNC: 3.7 MMOL/L (ref 3.7–5.3)
RBC # BLD: 3.24 M/UL (ref 3.95–5.11)
RBC # BLD: ABNORMAL 10*6/UL
SEG NEUTROPHILS: 24 % (ref 36–66)
SEGMENTED NEUTROPHILS ABSOLUTE COUNT: 1.34 K/UL (ref 1.8–7.7)
SODIUM BLD-SCNC: 141 MMOL/L (ref 135–144)
WBC # BLD: 5.6 K/UL (ref 3.5–11.3)
WBC # BLD: ABNORMAL 10*3/UL

## 2021-10-11 PROCEDURE — 3600000013 HC SURGERY LEVEL 3 ADDTL 15MIN: Performed by: PLASTIC SURGERY

## 2021-10-11 PROCEDURE — 6360000002 HC RX W HCPCS: Performed by: PLASTIC SURGERY

## 2021-10-11 PROCEDURE — 2580000003 HC RX 258: Performed by: PLASTIC SURGERY

## 2021-10-11 PROCEDURE — 6370000000 HC RX 637 (ALT 250 FOR IP): Performed by: PLASTIC SURGERY

## 2021-10-11 PROCEDURE — 6360000002 HC RX W HCPCS: Performed by: STUDENT IN AN ORGANIZED HEALTH CARE EDUCATION/TRAINING PROGRAM

## 2021-10-11 PROCEDURE — 7100000000 HC PACU RECOVERY - FIRST 15 MIN: Performed by: PLASTIC SURGERY

## 2021-10-11 PROCEDURE — 2580000003 HC RX 258: Performed by: NURSE ANESTHETIST, CERTIFIED REGISTERED

## 2021-10-11 PROCEDURE — 76937 US GUIDE VASCULAR ACCESS: CPT

## 2021-10-11 PROCEDURE — 6360000002 HC RX W HCPCS: Performed by: INTERNAL MEDICINE

## 2021-10-11 PROCEDURE — 3700000000 HC ANESTHESIA ATTENDED CARE: Performed by: PLASTIC SURGERY

## 2021-10-11 PROCEDURE — 0X6N0Z0 DETACHMENT AT RIGHT INDEX FINGER, COMPLETE, OPEN APPROACH: ICD-10-PCS | Performed by: PLASTIC SURGERY

## 2021-10-11 PROCEDURE — 1200000000 HC SEMI PRIVATE

## 2021-10-11 PROCEDURE — 88305 TISSUE EXAM BY PATHOLOGIST: CPT

## 2021-10-11 PROCEDURE — 99232 SBSQ HOSP IP/OBS MODERATE 35: CPT | Performed by: INTERNAL MEDICINE

## 2021-10-11 PROCEDURE — 88311 DECALCIFY TISSUE: CPT

## 2021-10-11 PROCEDURE — 3600000003 HC SURGERY LEVEL 3 BASE: Performed by: PLASTIC SURGERY

## 2021-10-11 PROCEDURE — 2500000003 HC RX 250 WO HCPCS: Performed by: NURSE ANESTHETIST, CERTIFIED REGISTERED

## 2021-10-11 PROCEDURE — 2580000003 HC RX 258: Performed by: STUDENT IN AN ORGANIZED HEALTH CARE EDUCATION/TRAINING PROGRAM

## 2021-10-11 PROCEDURE — 6370000000 HC RX 637 (ALT 250 FOR IP)

## 2021-10-11 PROCEDURE — 6370000000 HC RX 637 (ALT 250 FOR IP): Performed by: STUDENT IN AN ORGANIZED HEALTH CARE EDUCATION/TRAINING PROGRAM

## 2021-10-11 PROCEDURE — 2709999900 HC NON-CHARGEABLE SUPPLY: Performed by: PLASTIC SURGERY

## 2021-10-11 PROCEDURE — 99233 SBSQ HOSP IP/OBS HIGH 50: CPT | Performed by: STUDENT IN AN ORGANIZED HEALTH CARE EDUCATION/TRAINING PROGRAM

## 2021-10-11 PROCEDURE — 6360000002 HC RX W HCPCS: Performed by: NURSE ANESTHETIST, CERTIFIED REGISTERED

## 2021-10-11 PROCEDURE — 6360000002 HC RX W HCPCS

## 2021-10-11 PROCEDURE — 85025 COMPLETE CBC W/AUTO DIFF WBC: CPT

## 2021-10-11 PROCEDURE — 82947 ASSAY GLUCOSE BLOOD QUANT: CPT

## 2021-10-11 PROCEDURE — 80048 BASIC METABOLIC PNL TOTAL CA: CPT

## 2021-10-11 PROCEDURE — 6360000002 HC RX W HCPCS: Performed by: ANESTHESIOLOGY

## 2021-10-11 PROCEDURE — 6370000000 HC RX 637 (ALT 250 FOR IP): Performed by: ANESTHESIOLOGY

## 2021-10-11 PROCEDURE — 7100000001 HC PACU RECOVERY - ADDTL 15 MIN: Performed by: PLASTIC SURGERY

## 2021-10-11 PROCEDURE — 3700000001 HC ADD 15 MINUTES (ANESTHESIA): Performed by: PLASTIC SURGERY

## 2021-10-11 PROCEDURE — 2580000003 HC RX 258: Performed by: INTERNAL MEDICINE

## 2021-10-11 PROCEDURE — 36415 COLL VENOUS BLD VENIPUNCTURE: CPT

## 2021-10-11 PROCEDURE — 2500000003 HC RX 250 WO HCPCS: Performed by: PLASTIC SURGERY

## 2021-10-11 RX ORDER — FENTANYL CITRATE 50 UG/ML
INJECTION, SOLUTION INTRAMUSCULAR; INTRAVENOUS PRN
Status: DISCONTINUED | OUTPATIENT
Start: 2021-10-11 | End: 2021-10-11 | Stop reason: SDUPTHER

## 2021-10-11 RX ORDER — ROCURONIUM BROMIDE 10 MG/ML
INJECTION, SOLUTION INTRAVENOUS PRN
Status: DISCONTINUED | OUTPATIENT
Start: 2021-10-11 | End: 2021-10-11 | Stop reason: SDUPTHER

## 2021-10-11 RX ORDER — PROPOFOL 10 MG/ML
INJECTION, EMULSION INTRAVENOUS PRN
Status: DISCONTINUED | OUTPATIENT
Start: 2021-10-11 | End: 2021-10-11 | Stop reason: SDUPTHER

## 2021-10-11 RX ORDER — LIDOCAINE HYDROCHLORIDE 10 MG/ML
INJECTION, SOLUTION EPIDURAL; INFILTRATION; INTRACAUDAL; PERINEURAL PRN
Status: DISCONTINUED | OUTPATIENT
Start: 2021-10-11 | End: 2021-10-11 | Stop reason: SDUPTHER

## 2021-10-11 RX ORDER — DEXAMETHASONE SODIUM PHOSPHATE 10 MG/ML
INJECTION INTRAMUSCULAR; INTRAVENOUS PRN
Status: DISCONTINUED | OUTPATIENT
Start: 2021-10-11 | End: 2021-10-11 | Stop reason: SDUPTHER

## 2021-10-11 RX ORDER — 0.9 % SODIUM CHLORIDE 0.9 %
500 INTRAVENOUS SOLUTION INTRAVENOUS
Status: DISCONTINUED | OUTPATIENT
Start: 2021-10-11 | End: 2021-10-11 | Stop reason: HOSPADM

## 2021-10-11 RX ORDER — LABETALOL HYDROCHLORIDE 5 MG/ML
5 INJECTION, SOLUTION INTRAVENOUS EVERY 10 MIN PRN
Status: DISCONTINUED | OUTPATIENT
Start: 2021-10-11 | End: 2021-10-11 | Stop reason: HOSPADM

## 2021-10-11 RX ORDER — FENTANYL CITRATE 50 UG/ML
50 INJECTION, SOLUTION INTRAMUSCULAR; INTRAVENOUS EVERY 5 MIN PRN
Status: DISCONTINUED | OUTPATIENT
Start: 2021-10-11 | End: 2021-10-11 | Stop reason: HOSPADM

## 2021-10-11 RX ORDER — OXYCODONE HYDROCHLORIDE AND ACETAMINOPHEN 5; 325 MG/1; MG/1
1 TABLET ORAL EVERY 4 HOURS PRN
Status: DISCONTINUED | OUTPATIENT
Start: 2021-10-11 | End: 2021-10-11 | Stop reason: HOSPADM

## 2021-10-11 RX ORDER — PROMETHAZINE HYDROCHLORIDE 25 MG/ML
6.25 INJECTION, SOLUTION INTRAMUSCULAR; INTRAVENOUS
Status: DISCONTINUED | OUTPATIENT
Start: 2021-10-11 | End: 2021-10-11 | Stop reason: HOSPADM

## 2021-10-11 RX ORDER — MIDAZOLAM HYDROCHLORIDE 1 MG/ML
INJECTION INTRAMUSCULAR; INTRAVENOUS PRN
Status: DISCONTINUED | OUTPATIENT
Start: 2021-10-11 | End: 2021-10-11 | Stop reason: SDUPTHER

## 2021-10-11 RX ORDER — MAGNESIUM HYDROXIDE 1200 MG/15ML
LIQUID ORAL CONTINUOUS PRN
Status: COMPLETED | OUTPATIENT
Start: 2021-10-11 | End: 2021-10-11

## 2021-10-11 RX ORDER — DIPHENHYDRAMINE HYDROCHLORIDE 50 MG/ML
12.5 INJECTION INTRAMUSCULAR; INTRAVENOUS
Status: DISCONTINUED | OUTPATIENT
Start: 2021-10-11 | End: 2021-10-11 | Stop reason: HOSPADM

## 2021-10-11 RX ORDER — LORAZEPAM 2 MG/ML
0.5 INJECTION INTRAMUSCULAR ONCE
Status: COMPLETED | OUTPATIENT
Start: 2021-10-11 | End: 2021-10-11

## 2021-10-11 RX ORDER — HYDRALAZINE HYDROCHLORIDE 20 MG/ML
5 INJECTION INTRAMUSCULAR; INTRAVENOUS EVERY 10 MIN PRN
Status: DISCONTINUED | OUTPATIENT
Start: 2021-10-11 | End: 2021-10-11 | Stop reason: HOSPADM

## 2021-10-11 RX ORDER — SODIUM CHLORIDE 9 MG/ML
INJECTION, SOLUTION INTRAVENOUS CONTINUOUS PRN
Status: DISCONTINUED | OUTPATIENT
Start: 2021-10-11 | End: 2021-10-11 | Stop reason: SDUPTHER

## 2021-10-11 RX ORDER — ONDANSETRON 2 MG/ML
4 INJECTION INTRAMUSCULAR; INTRAVENOUS
Status: DISCONTINUED | OUTPATIENT
Start: 2021-10-11 | End: 2021-10-11 | Stop reason: HOSPADM

## 2021-10-11 RX ORDER — HYDROXYZINE HYDROCHLORIDE 10 MG/1
10 TABLET, FILM COATED ORAL 3 TIMES DAILY PRN
Status: DISCONTINUED | OUTPATIENT
Start: 2021-10-11 | End: 2021-10-12

## 2021-10-11 RX ORDER — FENTANYL CITRATE 50 UG/ML
25 INJECTION, SOLUTION INTRAMUSCULAR; INTRAVENOUS EVERY 5 MIN PRN
Status: DISCONTINUED | OUTPATIENT
Start: 2021-10-11 | End: 2021-10-11 | Stop reason: HOSPADM

## 2021-10-11 RX ORDER — ONDANSETRON 2 MG/ML
INJECTION INTRAMUSCULAR; INTRAVENOUS PRN
Status: DISCONTINUED | OUTPATIENT
Start: 2021-10-11 | End: 2021-10-11 | Stop reason: SDUPTHER

## 2021-10-11 RX ORDER — BUPIVACAINE HYDROCHLORIDE 2.5 MG/ML
INJECTION, SOLUTION INFILTRATION; PERINEURAL PRN
Status: DISCONTINUED | OUTPATIENT
Start: 2021-10-11 | End: 2021-10-11 | Stop reason: ALTCHOICE

## 2021-10-11 RX ADMIN — INSULIN GLARGINE 15 UNITS: 100 INJECTION, SOLUTION SUBCUTANEOUS at 21:11

## 2021-10-11 RX ADMIN — CEFTAROLINE FOSAMIL 600 MG: 600 POWDER, FOR SOLUTION INTRAVENOUS at 21:15

## 2021-10-11 RX ADMIN — SODIUM CHLORIDE, PRESERVATIVE FREE 10 ML: 5 INJECTION INTRAVENOUS at 21:17

## 2021-10-11 RX ADMIN — VENLAFAXINE HYDROCHLORIDE 150 MG: 150 CAPSULE, EXTENDED RELEASE ORAL at 09:44

## 2021-10-11 RX ADMIN — FENTANYL CITRATE 50 MCG: 50 INJECTION, SOLUTION INTRAMUSCULAR; INTRAVENOUS at 13:41

## 2021-10-11 RX ADMIN — FAMOTIDINE 20 MG: 20 TABLET, FILM COATED ORAL at 21:11

## 2021-10-11 RX ADMIN — PHENYLEPHRINE HYDROCHLORIDE 100 MCG: 10 INJECTION INTRAVENOUS at 13:53

## 2021-10-11 RX ADMIN — INSULIN LISPRO 6 UNITS: 100 INJECTION, SOLUTION INTRAVENOUS; SUBCUTANEOUS at 21:12

## 2021-10-11 RX ADMIN — TRAZODONE HYDROCHLORIDE 200 MG: 100 TABLET ORAL at 21:11

## 2021-10-11 RX ADMIN — INSULIN LISPRO 8 UNITS: 100 INJECTION, SOLUTION INTRAVENOUS; SUBCUTANEOUS at 23:00

## 2021-10-11 RX ADMIN — CETIRIZINE HYDROCHLORIDE 10 MG: 10 TABLET ORAL at 09:44

## 2021-10-11 RX ADMIN — FLUCONAZOLE 400 MG: 200 TABLET ORAL at 21:11

## 2021-10-11 RX ADMIN — CEFTAROLINE FOSAMIL 600 MG: 600 POWDER, FOR SOLUTION INTRAVENOUS at 09:39

## 2021-10-11 RX ADMIN — PHENYLEPHRINE HYDROCHLORIDE 100 MCG: 10 INJECTION INTRAVENOUS at 14:02

## 2021-10-11 RX ADMIN — PREGABALIN 75 MG: 75 CAPSULE ORAL at 21:10

## 2021-10-11 RX ADMIN — FENTANYL CITRATE 50 MCG: 50 INJECTION INTRAMUSCULAR; INTRAVENOUS at 19:40

## 2021-10-11 RX ADMIN — ONDANSETRON 4 MG: 2 INJECTION, SOLUTION INTRAMUSCULAR; INTRAVENOUS at 14:32

## 2021-10-11 RX ADMIN — LORAZEPAM 0.5 MG: 2 INJECTION INTRAMUSCULAR; INTRAVENOUS at 11:10

## 2021-10-11 RX ADMIN — FENTANYL CITRATE 50 MCG: 50 INJECTION INTRAMUSCULAR; INTRAVENOUS at 02:00

## 2021-10-11 RX ADMIN — FENTANYL CITRATE 50 MCG: 50 INJECTION, SOLUTION INTRAMUSCULAR; INTRAVENOUS at 14:48

## 2021-10-11 RX ADMIN — SODIUM CHLORIDE, PRESERVATIVE FREE 10 ML: 5 INJECTION INTRAVENOUS at 09:45

## 2021-10-11 RX ADMIN — SUGAMMADEX 200 MG: 100 INJECTION, SOLUTION INTRAVENOUS at 14:42

## 2021-10-11 RX ADMIN — PROPOFOL 170 MG: 10 INJECTION, EMULSION INTRAVENOUS at 13:41

## 2021-10-11 RX ADMIN — FENTANYL CITRATE 50 MCG: 50 INJECTION INTRAMUSCULAR; INTRAVENOUS at 07:37

## 2021-10-11 RX ADMIN — PREGABALIN 75 MG: 75 CAPSULE ORAL at 09:44

## 2021-10-11 RX ADMIN — SODIUM CHLORIDE 25 ML: 9 INJECTION, SOLUTION INTRAVENOUS at 11:57

## 2021-10-11 RX ADMIN — HYDROXYZINE HYDROCHLORIDE 10 MG: 10 TABLET ORAL at 22:29

## 2021-10-11 RX ADMIN — OXYCODONE HYDROCHLORIDE AND ACETAMINOPHEN 1 TABLET: 5; 325 TABLET ORAL at 15:11

## 2021-10-11 RX ADMIN — FENTANYL CITRATE 50 MCG: 50 INJECTION INTRAMUSCULAR; INTRAVENOUS at 15:13

## 2021-10-11 RX ADMIN — MIDAZOLAM HYDROCHLORIDE 2 MG: 1 INJECTION, SOLUTION INTRAMUSCULAR; INTRAVENOUS at 13:34

## 2021-10-11 RX ADMIN — LIDOCAINE HYDROCHLORIDE 50 MG: 10 INJECTION, SOLUTION EPIDURAL; INFILTRATION; INTRACAUDAL; PERINEURAL at 13:41

## 2021-10-11 RX ADMIN — SODIUM CHLORIDE: 9 INJECTION, SOLUTION INTRAVENOUS at 13:32

## 2021-10-11 RX ADMIN — FAMOTIDINE 20 MG: 20 TABLET, FILM COATED ORAL at 09:44

## 2021-10-11 RX ADMIN — ROCURONIUM BROMIDE 40 MG: 10 INJECTION INTRAVENOUS at 13:41

## 2021-10-11 RX ADMIN — FENTANYL CITRATE 50 MCG: 50 INJECTION INTRAMUSCULAR; INTRAVENOUS at 15:08

## 2021-10-11 RX ADMIN — PHENYLEPHRINE HYDROCHLORIDE 100 MCG: 10 INJECTION INTRAVENOUS at 14:19

## 2021-10-11 RX ADMIN — DEXAMETHASONE SODIUM PHOSPHATE 10 MG: 10 INJECTION INTRAMUSCULAR; INTRAVENOUS at 13:50

## 2021-10-11 ASSESSMENT — PULMONARY FUNCTION TESTS
PIF_VALUE: 20
PIF_VALUE: 21
PIF_VALUE: 21
PIF_VALUE: 20
PIF_VALUE: 18
PIF_VALUE: 22
PIF_VALUE: 22
PIF_VALUE: 20
PIF_VALUE: 20
PIF_VALUE: 23
PIF_VALUE: 22
PIF_VALUE: 20
PIF_VALUE: 21
PIF_VALUE: 1
PIF_VALUE: 20
PIF_VALUE: 25
PIF_VALUE: 20
PIF_VALUE: 1
PIF_VALUE: 22
PIF_VALUE: 1
PIF_VALUE: 21
PIF_VALUE: 22
PIF_VALUE: 3
PIF_VALUE: 20
PIF_VALUE: 20
PIF_VALUE: 22
PIF_VALUE: 21
PIF_VALUE: 21
PIF_VALUE: 1
PIF_VALUE: 22
PIF_VALUE: 13
PIF_VALUE: 20
PIF_VALUE: 1
PIF_VALUE: 20
PIF_VALUE: 3
PIF_VALUE: 16
PIF_VALUE: 21
PIF_VALUE: 22
PIF_VALUE: 20
PIF_VALUE: 22
PIF_VALUE: 23
PIF_VALUE: 6
PIF_VALUE: 21
PIF_VALUE: 21
PIF_VALUE: 9
PIF_VALUE: 1
PIF_VALUE: 20
PIF_VALUE: 22
PIF_VALUE: 20
PIF_VALUE: 1
PIF_VALUE: 14
PIF_VALUE: 20
PIF_VALUE: 0
PIF_VALUE: 22
PIF_VALUE: 20
PIF_VALUE: 20
PIF_VALUE: 21
PIF_VALUE: 20
PIF_VALUE: 20
PIF_VALUE: 22
PIF_VALUE: 20
PIF_VALUE: 22

## 2021-10-11 ASSESSMENT — PAIN DESCRIPTION - PAIN TYPE
TYPE: SURGICAL PAIN
TYPE: SURGICAL PAIN
TYPE: ACUTE PAIN

## 2021-10-11 ASSESSMENT — PAIN DESCRIPTION - ORIENTATION
ORIENTATION: RIGHT

## 2021-10-11 ASSESSMENT — PAIN DESCRIPTION - FREQUENCY
FREQUENCY: CONTINUOUS
FREQUENCY: CONTINUOUS

## 2021-10-11 ASSESSMENT — PAIN DESCRIPTION - PROGRESSION: CLINICAL_PROGRESSION: NOT CHANGED

## 2021-10-11 ASSESSMENT — PAIN SCALES - GENERAL
PAINLEVEL_OUTOF10: 9
PAINLEVEL_OUTOF10: 0
PAINLEVEL_OUTOF10: 10
PAINLEVEL_OUTOF10: 0
PAINLEVEL_OUTOF10: 6
PAINLEVEL_OUTOF10: 6
PAINLEVEL_OUTOF10: 10
PAINLEVEL_OUTOF10: 6
PAINLEVEL_OUTOF10: 8
PAINLEVEL_OUTOF10: 8
PAINLEVEL_OUTOF10: 7

## 2021-10-11 ASSESSMENT — ENCOUNTER SYMPTOMS
EYE DISCHARGE: 0
SHORTNESS OF BREATH: 0
EYE PAIN: 0
EYE ITCHING: 0
ABDOMINAL PAIN: 0
NAUSEA: 0

## 2021-10-11 ASSESSMENT — PAIN DESCRIPTION - DESCRIPTORS
DESCRIPTORS: BURNING
DESCRIPTORS: BURNING

## 2021-10-11 ASSESSMENT — PAIN - FUNCTIONAL ASSESSMENT
PAIN_FUNCTIONAL_ASSESSMENT: ACTIVITIES ARE NOT PREVENTED
PAIN_FUNCTIONAL_ASSESSMENT: 0-10

## 2021-10-11 ASSESSMENT — PAIN DESCRIPTION - ONSET
ONSET: ON-GOING
ONSET: ON-GOING

## 2021-10-11 ASSESSMENT — PAIN DESCRIPTION - LOCATION
LOCATION: HAND

## 2021-10-11 NOTE — ANESTHESIA POSTPROCEDURE EVALUATION
Department of Anesthesiology  Postprocedure Note    Patient: Angela Babcock  MRN: [id number]  YOB: 1965  Date of evaluation: 10/11/2021  Time:  3:34 PM     Procedure Summary     Date: 10/11/21 Room / Location: [address]    Anesthesia Start: 3338 Anesthesia Stop: 9948    Procedure: AMPUTATION RIGHT INDEX FINGER (Right ) Diagnosis: (CELLULITIS, GANGRENE RIGHT INDEX FINGER)    Surgeons: Rickey Guo MD Responsible Provider: Elle Bishop MD    Anesthesia Type: general ASA Status: 3          Anesthesia Type: general    Elin Phase I: Elin Score: 9    Elin Phase II:      Last vitals: Reviewed and per EMR flowsheets.        Anesthesia Post Evaluation    Patient location during evaluation: PACU  Patient participation: complete - patient participated  Level of consciousness: awake  Pain score: 1  Airway patency: patent  Nausea & Vomiting: no nausea and no vomiting  Complications: no  Cardiovascular status: blood pressure returned to baseline and hemodynamically stable  Respiratory status: acceptable  Hydration status: euvolemic

## 2021-10-11 NOTE — CARE COORDINATION
Pt states she does not want to return to [place] she wants [facility] if SNF is needed, referral is made

## 2021-10-11 NOTE — FLOWSHEET NOTE
SPIRITUAL CARE DEPARTMENT - [address]  PROGRESS NOTE    Shift date: 10/11/21  Shift day: Monday   Shift # 2    Room # 9458/9184-31   Name: John Cleary            Age: 49 y.o. Gender: female          Orthodoxy: Baptism   Place of Mandaeism: unknown    Referral: Routine Visit    Admit Date & Time: 10/6/2021  2:33 PM    PATIENT/EVENT DESCRIPTION:  John Cleary is a 49 y.o. female who recently had her finger amputated      SPIRITUAL ASSESSMENT/INTERVENTION:   visited as per [facility] notification from nurse.  met the nurse who explained the patients medical background.  visited with the patient who described her situation like 'Job' in the bible. She has in recent times lost a son, a daughter and her . Patient explained that she had now lost her finger.  engaged in active listening and provided a sustaining presence. Whilst visiting with the patient the patient's regular  phoned and appeared to provide her with positive  and prayer support.  also prayed with the patient and the patient expressed thanks and gratitude both to [address] [place] [address]. SPIRITUAL CARE FOLLOW-UP PLAN:  [facility]plains will remain available to offer spiritual and emotional support as needed. Nurse advised to contact Chaplains when the patient would like another visit.       Electronically signed by Leah Ivey on 10/11/2021 at 7:51 PM.  [facility]  [phone]

## 2021-10-11 NOTE — PROGRESS NOTES
[address] [facility]  Progress Note    Date:   10/11/2021  Patient name:  Andie Juarez  Date of admission:  10/6/2021  2:33 PM  MRN:   [id number]  YOB: 1965    SUBJECTIVE/Last 24 hours update:     Patient seen and examined at the bed side , no new acute events overnight. Is doing well, says she has some anxiety about surgery. Otherwise patient is doing well. Patient to be going down for surgery today at 1230. Notes from nursing staff and Consults had been reviewed, and the overnight progress had been checked with the nursing staff as well.       REVIEW OF SYSTEMS:      CONSTITUTIONAL:  no fevers, no headcahes  HEENT: No headaches, no difficulty swallowing  RESPIRATORY:negative for dyspnea, no wheezing, no Cough  CARDIOVASCULAR: negative for chest pain, no palpitations  GASTROINTESTINAL: no nausea, no vomiting, no abdominal pain, constipation present   GENITOURINARY: negative for dysuria, no hematuria   MUSCULOSKELETAL: no joint pains, no muscle aches, no swelling of joints or extremities  NEUROLOGICAL: No  Weakness or numbness      PAST MEDICAL HISTORY:      has a past medical history of Acid reflux, Acute cystitis with hematuria, Acute non-recurrent maxillary sinusitis, Asthma, Bipolar 1 disorder ([address] Bipolar disorder, mixed ([address] BMI 34.0-34.9,adult, Cannabis use disorder, severe, dependence ([address] Cerebrovascular accident (CVA) ([address] Chest pain, Chronic renal insufficiency, Cocaine abuse ([address] COVID-19 virus RNA test result unknown, DDD (degenerative disc disease), cervical, Diabetes mellitus ([address] Dizziness, Fibromyalgia, History of stroke, Homicidal ideation, Hyperglycemia, Hypertension, Hypotension, IDDM (insulin dependent diabetes mellitus), Lupus ([address] Migraine, Neuropathy, Neuropathy, Polysubstance abuse ([address] Post traumatic stress disorder (PTSD), Posttraumatic stress disorder, Recurrent depression ([address] Recurrent major depressive disorder, in partial remission ([facility] [address] [address] Screening mammogram, encounter for, Severe recurrent major depression with psychotic features ([facility] [address] Severe recurrent major depression without psychotic features ([facility] [address] Stroke (cerebrum) ([facility] [address] Stroke ([facility] [address] Suicidal ideation, Suicidal intent, Vitamin D deficiency, and White matter changes. PAST SURGICAL HISTORY:      has a past surgical history that includes Abscess Drainage; chest tube insertion; Abdomen surgery; Cataract removal with implant (Bilateral); LASIK (Bilateral); Finger amputation (Left, 03/13/2021); Hand surgery (Right, 09/14/2021); Finger surgery (Right, 09/15/2021); and Hand surgery (Right, 9/15/2021). SOCIAL HISTORY:      reports that she has never smoked. She has never used smokeless tobacco. She reports previous alcohol use. She reports current drug use. Drugs: Marijuana and Cocaine. FAMILY HISTORY:     family history includes Diabetes in her brother, father, mother, and sister; No Known Problems in her sister; Other in her son; Stroke in her mother. HOME MEDICATIONS:      Prior to Admission medications    Medication Sig Start Date End Date Taking? Authorizing Provider   amoxicillin (AMOXIL) 500 MG capsule Take 2 capsules by mouth 2 times daily for 21 days 9/24/21 10/15/21  Maylin Pineda MD   fluconazole (DIFLUCAN) 200 MG tablet Take 2 tablets by mouth daily for 21 days Till 10/11/21 - 21 days  Watch LFT 2 x per week 9/20/21 10/11/21  Arlette Lazarus Rubenstein, MD   cetirizine (ZYRTEC) 10 MG tablet Take 1 tablet by mouth daily 8/9/21   Jose Macedo MD   insulin glargine (LANTUS SOLOSTAR) 100 UNIT/ML injection pen Inject 20 Units into the skin 2 times daily 8/9/21   Jose Macedo MD   pregabalin (LYRICA) 75 MG capsule Take 1 capsule by mouth 2 times daily for 30 days. Patient taking differently: Take 100 mg by mouth 2 times daily.   8/9/21 9/8/21  Jose Macedo MD   pantoprazole (PROTONIX) 20 MG tablet Take 1 tablet by mouth 2 times daily Alert , awake , not in acute distress  HEENT - Head is normocephalic  Lungs - Bilateral equal air entry , no wheezes, rales or rhonchi, aeration good  Cardiovascular - Heart sounds are normal.  Regular rhythm, normal rate without murmur, gallop or rub. Abdomen - Soft, nontender, nondistended, no masses or organomegaly  Neurologic - There are no new focal motor or sensory deficits  Skin - Right index finger wrapped in guaze.    Extremities - No cyanosis, clubbing or edema    DIAGNOSTICS:     Laboratory Testing:    Recent Results (from the past 24 hour(s))   POC Glucose Fingerstick    Collection Time: 10/10/21 12:01 PM   Result Value Ref Range    POC Glucose 239 (H) 65 - 105 mg/dL   POC Glucose Fingerstick    Collection Time: 10/10/21  5:04 PM   Result Value Ref Range    POC Glucose 231 (H) 65 - 105 mg/dL   POC Glucose Fingerstick    Collection Time: 10/10/21  9:06 PM   Result Value Ref Range    POC Glucose 160 (H) 65 - 105 mg/dL       Current Facility-Administered Medications   Medication Dose Route Frequency Provider Last Rate Last Admin    cetirizine (ZYRTEC) tablet 10 mg  10 mg Oral Daily Harleen Aguilar MD   10 mg at 10/10/21 0950    polyethylene glycol (GLYCOLAX) packet 17 g  17 g Oral Daily Harleen Aguilar MD   17 g at 10/10/21 0916    calcium carbonate (TUMS) chewable tablet 500 mg  500 mg Oral TID PRN Tena Trinidad MD   500 mg at 10/10/21 1330    oxyCODONE-acetaminophen (PERCOCET) 5-325 MG per tablet 1 tablet  1 tablet Oral Q4H PRN Tena Trinidad MD   1 tablet at 10/10/21 1401    hydrALAZINE (APRESOLINE) injection 5 mg  5 mg IntraVENous Q6H PRN Juanpablo Hameed MD        fentaNYL (SUBLIMAZE) injection 50 mcg  50 mcg IntraVENous Q4H PRN Juanpablo Hameed MD   50 mcg at 10/11/21 0200    sennosides-docusate sodium (SENOKOT-S) 8.6-50 MG tablet 2 tablet  2 tablet Oral Daily PRN Juanpablo Hameed MD   2 tablet at 10/10/21 0818    famotidine (PEPCID) tablet 20 mg  20 mg Oral BID Diaz White DO 20 mg at 10/10/21 2104    insulin glargine (LANTUS) injection vial 15 Units  15 Units SubCUTAneous BID Juanpablo Hameed MD   15 Units at 10/10/21 2107    enoxaparin (LOVENOX) injection 30 mg  30 mg SubCUTAneous BID Tena Trinidad MD        pregabalin (LYRICA) capsule 75 mg  75 mg Oral BID Tena Trinidad MD   75 mg at 10/10/21 2104    traZODone (DESYREL) tablet 200 mg  200 mg Oral Nightly Diaz Reedtor, DO   200 mg at 10/10/21 2103    venlafaxine (EFFEXOR XR) extended release capsule 150 mg  150 mg Oral Daily with breakfast Diaz Reedtor, DO   150 mg at 10/10/21 0818    sodium chloride flush 0.9 % injection 5-40 mL  5-40 mL IntraVENous 2 times per day Diaz Reedtor, DO   10 mL at 10/10/21 4881    sodium chloride flush 0.9 % injection 5-40 mL  5-40 mL IntraVENous PRN Diaz White, DO        0.9 % sodium chloride infusion  25 mL IntraVENous PRN Diaz White, DO        potassium chloride (KLOR-CON M) extended release tablet 40 mEq  40 mEq Oral PRN Amparoa White, DO        Or    potassium bicarb-citric acid (EFFER-K) effervescent tablet 40 mEq  40 mEq Oral PRN Amparoa White, DO        Or    potassium chloride 10 mEq/100 mL IVPB (Peripheral Line)  10 mEq IntraVENous PRN Amparoa White, DO        ondansetron [facility]) injection 4 mg  4 mg IntraVENous Q4H PRN Diaz White, DO   4 mg at 10/06/21 2334    acetaminophen (TYLENOL) tablet 650 mg  650 mg Oral Q6H PRN Diaz White, DO   650 mg at 10/08/21 1236    Or    acetaminophen (TYLENOL) suppository 650 mg  650 mg Rectal Q6H PRN Diaz White, DO        insulin lispro (HUMALOG) injection vial 0-12 Units  0-12 Units SubCUTAneous TID WC Diaz Reedtor, DO   4 Units at 10/10/21 1812    insulin lispro (HUMALOG) injection vial 0-6 Units  0-6 Units SubCUTAneous Nightly Diaz Reedtor, DO   1 Units at 10/10/21 2107    glucose (GLUTOSE) 40 % oral gel 15 g  15 g Oral PRN Diaz White, DO        dextrose 50 % IV solution  12.5 g IntraVENous PRN Apolonia Gonzalesid, DO        glucagon (rDNA) injection 1 mg  1 mg IntraMUSCular PRN Apolonia Gonzalesid, DO        dextrose 5 % solution  100 mL/hr IntraVENous PRN Apolonia Gonzalesid, DO        ceftaroline fosamil (TEFLARO) 600 mg in dextrose 5 % 50 mL IVPB  600 mg IntraVENous Q12H Vivek Prior, MD   Stopped at 10/10/21 2252    fluconazole (DIFLUCAN) tablet 400 mg  400 mg Oral Q24H Vivek Prior, MD   400 mg at 10/10/21 2104       ASSESSMENT:     Active Problems:    Open wound of right index finger    Adverse effect of COVID-19 vaccine    Wound dehiscence  Resolved Problems:    * No resolved hospital problems. *      PLAN:     Discussed care plan with nurse after getting her input. Wound Dehiscence of right index finger  - Pain control with fentanyl  - Plastic surgery on board              - Surgery today, repeat I&D with possible first finger amputation.     - NPO  -Infectious disease on board              -Continue ceftaroline and Diflucan due to gram-positive cocci as well as Candida noted on previous wound culture.  Current wound culture polymicrobial     Bipolar depression   - Continue home Effexor      Diabetes mellitus, type 2   - Medium dose sliding scale   - Lantus 15 units twice daily, reduced from home dose of 20 units twice daily  - hypoglycemia protocol in place   - Glucose 107      Asthma  - Continue home Flovent  - Continue home Symbicort      DVT prophylaxis: Lovenox 40 mg SC  GI prophylaxis: Famotidine 20 mg BID     Above plan discussed with the patient who agreed to the above plan      Plan will be discussed with the attending, Dr. Melyssa Mc MD  Family Medicine Resident  10/11/2021 7:30 AM            Please note that this chart was generated using voice recognition Dragon dictation software.   Although every effort was made to ensure the accuracy of this automated transcription, some errors in transcription may have occurred

## 2021-10-11 NOTE — BRIEF OP NOTE
Brief Postoperative Note      Patient: Dwayne Martinez  YOB: 1965  MRN: [id number]    Date of Procedure: 10/11/2021    Pre-Op Diagnosis: CELLULITIS, GANGRENE RIGHT INDEX FINGER    Post-Op Diagnosis: Same       Procedure(s):  AMPUTATION RIGHT INDEX FINGER    Surgeon(s):  Marianne Barreto MD    Assistant:  * No surgical staff found *    Anesthesia: General    Estimated Blood Loss (mL): Minimal    Complications: None    Specimens:   * No specimens in log *    Implants:  * No implants in log *      Drains: * No LDAs found *    Findings:     Electronically signed by Marianne Barreto MD on 10/11/2021 at 2:46 PM

## 2021-10-12 VITALS
DIASTOLIC BLOOD PRESSURE: 90 MMHG | SYSTOLIC BLOOD PRESSURE: 152 MMHG | WEIGHT: 230 LBS | TEMPERATURE: 97 F | BODY MASS INDEX: 40.75 KG/M2 | HEIGHT: 63 IN | HEART RATE: 71 BPM | RESPIRATION RATE: 18 BRPM | OXYGEN SATURATION: 95 %

## 2021-10-12 PROBLEM — S68.119A AMPUTATION FINGER: Status: ACTIVE | Noted: 2021-10-12

## 2021-10-12 LAB
ABSOLUTE EOS #: 0.08 K/UL (ref 0–0.4)
ABSOLUTE IMMATURE GRANULOCYTE: 0 K/UL (ref 0–0.3)
ABSOLUTE LYMPH #: 1.62 K/UL (ref 1–4.8)
ABSOLUTE MONO #: 0.41 K/UL (ref 0.1–0.8)
ANION GAP SERPL CALCULATED.3IONS-SCNC: 16 MMOL/L (ref 9–17)
BASOPHILS # BLD: 1 % (ref 0–2)
BASOPHILS ABSOLUTE: 0.08 K/UL (ref 0–0.2)
BUN BLDV-MCNC: 15 MG/DL (ref 6–20)
BUN/CREAT BLD: ABNORMAL (ref 9–20)
CALCIUM SERPL-MCNC: 8.7 MG/DL (ref 8.6–10.4)
CHLORIDE BLD-SCNC: 100 MMOL/L (ref 98–107)
CO2: 20 MMOL/L (ref 20–31)
CREAT SERPL-MCNC: 0.82 MG/DL (ref 0.5–0.9)
DIFFERENTIAL TYPE: ABNORMAL
EKG ATRIAL RATE: 71 BPM
EKG P AXIS: 33 DEGREES
EKG P-R INTERVAL: 164 MS
EKG Q-T INTERVAL: 442 MS
EKG QRS DURATION: 90 MS
EKG QTC CALCULATION (BAZETT): 480 MS
EKG T AXIS: 27 DEGREES
EKG VENTRICULAR RATE: 71 BPM
EOSINOPHILS RELATIVE PERCENT: 1 % (ref 1–4)
GFR AFRICAN AMERICAN: >60 ML/MIN
GFR NON-AFRICAN AMERICAN: >60 ML/MIN
GFR SERPL CREATININE-BSD FRML MDRD: ABNORMAL ML/MIN/{1.73_M2}
GFR SERPL CREATININE-BSD FRML MDRD: ABNORMAL ML/MIN/{1.73_M2}
GLUCOSE BLD-MCNC: 285 MG/DL (ref 65–105)
GLUCOSE BLD-MCNC: 341 MG/DL (ref 65–105)
GLUCOSE BLD-MCNC: 360 MG/DL (ref 65–105)
GLUCOSE BLD-MCNC: 375 MG/DL (ref 65–105)
GLUCOSE BLD-MCNC: 380 MG/DL (ref 65–105)
GLUCOSE BLD-MCNC: 393 MG/DL (ref 65–105)
GLUCOSE BLD-MCNC: 399 MG/DL (ref 70–99)
HCT VFR BLD CALC: 35.3 % (ref 36.3–47.1)
HEMOGLOBIN: 10.6 G/DL (ref 11.9–15.1)
IMMATURE GRANULOCYTES: 0 %
LYMPHOCYTES # BLD: 20 % (ref 24–44)
MCH RBC QN AUTO: 31.5 PG (ref 25.2–33.5)
MCHC RBC AUTO-ENTMCNC: 30 G/DL (ref 28.4–34.8)
MCV RBC AUTO: 104.7 FL (ref 82.6–102.9)
MONOCYTES # BLD: 5 % (ref 1–7)
MORPHOLOGY: ABNORMAL
NRBC AUTOMATED: 0 PER 100 WBC
PDW BLD-RTO: 12.9 % (ref 11.8–14.4)
PLATELET # BLD: 353 K/UL (ref 138–453)
PLATELET ESTIMATE: ABNORMAL
PMV BLD AUTO: 10.4 FL (ref 8.1–13.5)
POTASSIUM SERPL-SCNC: 4.4 MMOL/L (ref 3.7–5.3)
RBC # BLD: 3.37 M/UL (ref 3.95–5.11)
RBC # BLD: ABNORMAL 10*6/UL
SEG NEUTROPHILS: 73 % (ref 36–66)
SEGMENTED NEUTROPHILS ABSOLUTE COUNT: 5.91 K/UL (ref 1.8–7.7)
SODIUM BLD-SCNC: 136 MMOL/L (ref 135–144)
TOTAL CK: 70 U/L (ref 26–192)
WBC # BLD: 8.1 K/UL (ref 3.5–11.3)
WBC # BLD: ABNORMAL 10*3/UL

## 2021-10-12 PROCEDURE — 80048 BASIC METABOLIC PNL TOTAL CA: CPT

## 2021-10-12 PROCEDURE — 6360000002 HC RX W HCPCS: Performed by: INTERNAL MEDICINE

## 2021-10-12 PROCEDURE — 6360000002 HC RX W HCPCS: Performed by: PLASTIC SURGERY

## 2021-10-12 PROCEDURE — 6370000000 HC RX 637 (ALT 250 FOR IP)

## 2021-10-12 PROCEDURE — 82947 ASSAY GLUCOSE BLOOD QUANT: CPT

## 2021-10-12 PROCEDURE — 2580000003 HC RX 258: Performed by: PLASTIC SURGERY

## 2021-10-12 PROCEDURE — 6370000000 HC RX 637 (ALT 250 FOR IP): Performed by: PLASTIC SURGERY

## 2021-10-12 PROCEDURE — 36415 COLL VENOUS BLD VENIPUNCTURE: CPT

## 2021-10-12 PROCEDURE — 99239 HOSP IP/OBS DSCHRG MGMT >30: CPT | Performed by: STUDENT IN AN ORGANIZED HEALTH CARE EDUCATION/TRAINING PROGRAM

## 2021-10-12 PROCEDURE — 99232 SBSQ HOSP IP/OBS MODERATE 35: CPT | Performed by: INTERNAL MEDICINE

## 2021-10-12 PROCEDURE — 6370000000 HC RX 637 (ALT 250 FOR IP): Performed by: STUDENT IN AN ORGANIZED HEALTH CARE EDUCATION/TRAINING PROGRAM

## 2021-10-12 PROCEDURE — 85025 COMPLETE CBC W/AUTO DIFF WBC: CPT

## 2021-10-12 PROCEDURE — 2580000003 HC RX 258: Performed by: INTERNAL MEDICINE

## 2021-10-12 PROCEDURE — 82550 ASSAY OF CK (CPK): CPT

## 2021-10-12 RX ORDER — INSULIN GLARGINE 100 [IU]/ML
20 INJECTION, SOLUTION SUBCUTANEOUS 2 TIMES DAILY
Status: DISCONTINUED | OUTPATIENT
Start: 2021-10-12 | End: 2021-10-12 | Stop reason: HOSPADM

## 2021-10-12 RX ORDER — OXYCODONE HYDROCHLORIDE AND ACETAMINOPHEN 5; 325 MG/1; MG/1
2 TABLET ORAL EVERY 4 HOURS PRN
Status: DISCONTINUED | OUTPATIENT
Start: 2021-10-12 | End: 2021-10-12 | Stop reason: HOSPADM

## 2021-10-12 RX ORDER — SENNA AND DOCUSATE SODIUM 50; 8.6 MG/1; MG/1
2 TABLET, FILM COATED ORAL DAILY
Qty: 30 TABLET | Refills: 1 | Status: ON HOLD | OUTPATIENT
Start: 2021-10-13 | End: 2021-11-08

## 2021-10-12 RX ORDER — DIPHENHYDRAMINE HCL 25 MG
25 TABLET ORAL EVERY 6 HOURS PRN
Status: DISCONTINUED | OUTPATIENT
Start: 2021-10-12 | End: 2021-10-12 | Stop reason: HOSPADM

## 2021-10-12 RX ORDER — FLUCONAZOLE 200 MG/1
400 TABLET ORAL EVERY 24 HOURS
Qty: 14 TABLET | Refills: 0 | Status: SHIPPED | OUTPATIENT
Start: 2021-10-12 | End: 2021-10-19

## 2021-10-12 RX ORDER — SENNA AND DOCUSATE SODIUM 50; 8.6 MG/1; MG/1
2 TABLET, FILM COATED ORAL DAILY
Status: DISCONTINUED | OUTPATIENT
Start: 2021-10-13 | End: 2021-10-12 | Stop reason: HOSPADM

## 2021-10-12 RX ORDER — FENTANYL CITRATE 50 UG/ML
50 INJECTION, SOLUTION INTRAMUSCULAR; INTRAVENOUS
Status: DISCONTINUED | OUTPATIENT
Start: 2021-10-12 | End: 2021-10-12 | Stop reason: HOSPADM

## 2021-10-12 RX ORDER — HYDROMORPHONE HYDROCHLORIDE 2 MG/1
2 TABLET ORAL EVERY 12 HOURS PRN
Qty: 15 TABLET | Refills: 0 | Status: SHIPPED | OUTPATIENT
Start: 2021-10-12 | End: 2021-10-17

## 2021-10-12 RX ADMIN — OXYCODONE HYDROCHLORIDE AND ACETAMINOPHEN 2 TABLET: 5; 325 TABLET ORAL at 11:39

## 2021-10-12 RX ADMIN — INSULIN LISPRO 10 UNITS: 100 INJECTION, SOLUTION INTRAVENOUS; SUBCUTANEOUS at 08:31

## 2021-10-12 RX ADMIN — PREGABALIN 75 MG: 75 CAPSULE ORAL at 09:29

## 2021-10-12 RX ADMIN — FENTANYL CITRATE 50 MCG: 50 INJECTION INTRAMUSCULAR; INTRAVENOUS at 08:30

## 2021-10-12 RX ADMIN — CEFTAROLINE FOSAMIL 600 MG: 600 POWDER, FOR SOLUTION INTRAVENOUS at 09:29

## 2021-10-12 RX ADMIN — FENTANYL CITRATE 50 MCG: 50 INJECTION INTRAMUSCULAR; INTRAVENOUS at 00:11

## 2021-10-12 RX ADMIN — DOCUSATE SODIUM 50MG AND SENNOSIDES 8.6MG 2 TABLET: 8.6; 5 TABLET, FILM COATED ORAL at 07:28

## 2021-10-12 RX ADMIN — INSULIN LISPRO 6 UNITS: 100 INJECTION, SOLUTION INTRAVENOUS; SUBCUTANEOUS at 17:28

## 2021-10-12 RX ADMIN — INSULIN GLARGINE 20 UNITS: 100 INJECTION, SOLUTION SUBCUTANEOUS at 08:45

## 2021-10-12 RX ADMIN — OXYCODONE HYDROCHLORIDE AND ACETAMINOPHEN 1 TABLET: 5; 325 TABLET ORAL at 07:28

## 2021-10-12 RX ADMIN — INSULIN LISPRO 10 UNITS: 100 INJECTION, SOLUTION INTRAVENOUS; SUBCUTANEOUS at 13:17

## 2021-10-12 RX ADMIN — FAMOTIDINE 20 MG: 20 TABLET, FILM COATED ORAL at 09:29

## 2021-10-12 RX ADMIN — FENTANYL CITRATE 50 MCG: 50 INJECTION INTRAMUSCULAR; INTRAVENOUS at 04:18

## 2021-10-12 RX ADMIN — SODIUM CHLORIDE, PRESERVATIVE FREE 10 ML: 5 INJECTION INTRAVENOUS at 08:32

## 2021-10-12 RX ADMIN — DAPTOMYCIN 295 MG: 500 INJECTION, POWDER, LYOPHILIZED, FOR SOLUTION INTRAVENOUS at 13:51

## 2021-10-12 RX ADMIN — OXYCODONE HYDROCHLORIDE AND ACETAMINOPHEN 2 TABLET: 5; 325 TABLET ORAL at 16:01

## 2021-10-12 RX ADMIN — DIPHENHYDRAMINE HCL 25 MG: 25 TABLET ORAL at 07:28

## 2021-10-12 RX ADMIN — POLYETHYLENE GLYCOL 3350 17 G: 17 POWDER, FOR SOLUTION ORAL at 08:31

## 2021-10-12 RX ADMIN — VENLAFAXINE HYDROCHLORIDE 150 MG: 150 CAPSULE, EXTENDED RELEASE ORAL at 09:35

## 2021-10-12 RX ADMIN — ENOXAPARIN SODIUM 30 MG: 30 INJECTION SUBCUTANEOUS at 08:31

## 2021-10-12 ASSESSMENT — ENCOUNTER SYMPTOMS
EYE PAIN: 0
EYE DISCHARGE: 0
ABDOMINAL PAIN: 0
EYE ITCHING: 0
SHORTNESS OF BREATH: 0
NAUSEA: 0

## 2021-10-12 ASSESSMENT — PAIN SCALES - GENERAL
PAINLEVEL_OUTOF10: 10
PAINLEVEL_OUTOF10: 10
PAINLEVEL_OUTOF10: 7
PAINLEVEL_OUTOF10: 10
PAINLEVEL_OUTOF10: 10
PAINLEVEL_OUTOF10: 8
PAINLEVEL_OUTOF10: 10

## 2021-10-12 NOTE — PROGRESS NOTES
CLINICAL PHARMACY NOTE: MEDS TO BEDS    Total # of Prescriptions Filled: 2   The following medications were delivered to the patient:  · Senokot 8.6-50mg  · Hydromorphone 2mg    Additional Documentation: delivered to patient in room 349 10/12 at 4:19pm. No co-pay. Fluconazole too soon to fill next fill 10-.  RC

## 2021-10-12 NOTE — CARE COORDINATION
Patient needs rides for her infusions. Writer set up transportation through [employer] at [employer]. Arranged for a 1100 . Patient needs to be ready for  at 0930. Transport is arranged from 10/13 to 10/20. Patient is notified and agrees. Reference number is [id number]. Patient has decided to coome to the OP infusion center in [place] of home care. 1730 patient needs a ride home. Transportation is arranged through [employer] at [employer]. and will pick the patient up in the next hour. Patient and RN are notified. Discharge [id number] [facility]  Clinical Case Management Department  Written by: Lucillie Merlin, RN    Patient Name: Dontrell Anthony  Attending Provider: Ethan Ham MD  Admit Date: 10/6/2021  2:33 PM  MRN: [id number]  Account: [de-identified]                     : 1965  Discharge Date:       Disposition: Home with OP infusion center orders.     Lucillie Merlin, RN

## 2021-10-12 NOTE — PROGRESS NOTES
[facility] -   Infectious diseases evaluation  admission date 10/6/2021    reason for consultation:   Right hand infection    Impression :   Current:  · Right second finger infected soft tissue then osteomyelitis  · Right hand soft tissue infection  · Diabetes on insulin  · SLE    Other:  ·   Discussion / summary of stay / plan of care   ·   Recommendations   · Continue high-dose Diflucan 400 mg a day p.o. and ceftaroline to spare the kidneys and diabetic  · Current pus cx MSSA and kleb S ceftriaoxne  · Past pus cx MR SCN and candida glabrata  · Following culture and short-term antibiotic course according to response        Infection Control Recommendations   · [place] Precautions      Antimicrobial Stewardship Recommendations   · Simplification of therapy  · Targeted therapy  · IV to oral conversion  · Per Kg dosing  · Restricted antimicrobial use  · Discontinuation of therapy    Coordination ofOutpatient Care:   · Estimated Length of IV antimicrobials:  · Patient will need Midline / picc Catheter Insertion:   · Patient will need SNF:  · Patient will need outpatient wound care:     History of Present Illness:   Initial history:  Genaro Vences is a 49y.o.-year-old female very familiar to me who was admitted earlier this month to the hospital with a cellulitis and an abscess over the right hand mostly the right second finger, cultures grew gram-positive cocci as well as Candida and the patient was discharged on Diflucan and amoxicillin. The target was soft tissue infection and tendinitis, there was a concern of possible osteomyelitis. The patient demanded amputation before leaving. Ortho had followed her and planned to see her again in amputate the finger if it does not respond to antibiotic therapy. She comes back with the finger still hurting her, has not improved, and was planned to go for amputation of the morning.   Her finger is very stiff the soft tissues are hard, she is not able to bend it  Picture as below. Cultures from the finger 9/23 showing staph epidermides 2 strains methicillin-resistant and staph lugdunensis methicillin resistant     9/14 culture showed Candida glabrata  Interval changes  10/11/2021   Patient Vitals for the past 8 hrs:   BP Temp Temp src Pulse Resp SpO2   10/11/21 2106 130/82 97 °F (36.1 °C) Oral 77 19 96 %   10/11/21 1830 120/77 -- -- 80 16 98 %   10/11/21 1730 111/70 -- -- 76 18 --   10/11/21 1645 107/67 -- -- 71 16 98 %   10/11/21 1623 (!) 114/59 -- -- 84 18 93 %     10/8  Patient states pain in right hand has improved  Afebrile  Aerobic culture grew GPC in pains identified as Staph aureus and lactose-fermenting GNR  Surgery to be performed on Monday    10/9  Right hand continues to be painful  Remains afebrile  Aerobic wound culture grew GPC in pairs further identified as MSSA and lactose fermenting GNR further identified as Klebsiella Pneumoniae  Surgery scheduled for Monday    10/11  Alert appropriate post right finger excision. In good spirits pain controlled, labs reviewed  Culture right index 10/7 showing MSSA and Klebsiella both sensitive to ceftriaxone        Summary of relevant labs:  Labs: WBC 5.5-> 4.1   CRP 34.1    Micro: Aerobic Culture (10/7) grew GPC in pairs identified as MSSA and lactose fermenting GNR further identified as Klebsiella Pneumoniae    Imaging:      I have personally reviewed the past medical history, past surgical history, medications, social history, and family history, and I haveupdated the database accordingly. Allergies:   Bactrim [sulfamethoxazole-trimethoprim] and Adhesive tape     Review of Systems:     Review of Systems   Constitutional: Positive for appetite change. Negative for activity change. HENT: Negative for congestion. Eyes: Negative for pain, discharge and itching. Respiratory: Negative for shortness of breath. Cardiovascular: Negative for chest pain. Gastrointestinal: Negative for abdominal pain and nausea. Endocrine: Negative for cold intolerance. Genitourinary: Negative for dysuria. Musculoskeletal: Negative for arthralgias. Skin: Positive for wound. Allergic/Immunologic: Negative for immunocompromised state. Neurological: Negative for dizziness. Hematological: Negative for adenopathy. Psychiatric/Behavioral: Negative for agitation and confusion. The patient is not nervous/anxious. Physical Examination :       Physical Exam  Constitutional:       General: She is not in acute distress. Appearance: Normal appearance. She is not ill-appearing. HENT:      Head: Normocephalic. Mouth/Throat:      Mouth: Mucous membranes are moist.      Pharynx: Oropharynx is clear. Cardiovascular:      Rate and Rhythm: Normal rate and regular rhythm. Pulses: Normal pulses. Pulmonary:      Effort: Pulmonary effort is normal.      Breath sounds: Normal breath sounds. Abdominal:      Palpations: Abdomen is soft. There is no mass. Tenderness: There is no abdominal tenderness. Musculoskeletal:      Cervical back: No rigidity or tenderness. Skin:     Findings: Lesion present. Neurological:      General: No focal deficit present. Mental Status: She is alert. Mental status is at baseline.    Psychiatric:         Mood and Affect: Mood normal.         Past Medical History:     Past Medical History:   Diagnosis Date    Acid reflux 5/29/2017    Acute cystitis with hematuria 10/11/2016    Acute non-recurrent maxillary sinusitis 11/28/2017    Asthma     Bipolar 1 disorder ([address] 1/4/2018    Bipolar disorder, mixed [facility]     BMI 34.0-34.9,adult 11/28/2017    Cannabis use disorder, severe, dependence ([address] 12/19/2017    Cerebrovascular accident (CVA) ([address] 6/14/2017    Chest pain 11/5/2016    Chronic renal insufficiency     Cocaine abuse ([address] 1/5/2018    COVID-19 virus RNA test result unknown     DDD (degenerative disc disease), cervical     Diabetes mellitus ([address]     Dizziness 6/13/2017    Fibromyalgia     History of stroke 9/9/2017    Homicidal ideation 11/6/2017    Hyperglycemia     Hypertension     Hypotension 1/18/2019    IDDM (insulin dependent diabetes mellitus) 12/21/2015    Lupus ([address]     Migraine     Neuropathy     Neuropathy     Polysubstance abuse ([address] 9/17/2017    Post traumatic stress disorder (PTSD)     Posttraumatic stress disorder 5/29/2017    Recurrent depression ([address] 5/29/2017    Recurrent major depressive disorder, in partial remission ([address] 11/28/2017    Screening mammogram, encounter for 11/28/2017    Severe recurrent major depression with psychotic features ([address] 12/19/2017    Severe recurrent major depression without psychotic features ([address] 12/19/2017    Stroke (cerebrum) ([address] 6/14/2017    Stroke ([address]     per chart notes    Suicidal ideation     Suicidal intent 3/10/2017    Vitamin D deficiency 11/28/2017    White matter changes 6/13/2017       Past Surgical  History:     Past Surgical History:   Procedure Laterality Date    ABDOMEN SURGERY      drain tube    ABSCESS DRAINAGE      right buttock    CATARACT REMOVAL WITH IMPLANT Bilateral     CHEST TUBE INSERTION      FINGER AMPUTATION Left 03/13/2021    LEFT RING FINER AMPUTATION performed by [facility] Marc [facility] at [facility] Right 10/11/2021    AMPUTATION RIGHT INDEX FINGER    HAND SURGERY Right 09/14/2021    I&D INDEX FINGER performed by Jamey Marc MD at [address] [place] [address] Right 09/15/2021    I&D INDEX FINGER performed by Jamey Marc MD at [address] [place] [address] Bilateral        Medications:      cetirizine  10 mg Oral Daily    polyethylene glycol  17 g Oral Daily    famotidine  20 mg Oral BID    insulin glargine  15 Units SubCUTAneous BID    enoxaparin  30 mg SubCUTAneous BID    pregabalin  75 mg Oral BID    traZODone  200 mg Oral Nightly    venlafaxine  150 mg Oral Daily Emotionally Abused:     Physically Abused:     Sexually Abused:        Family History:     Family History   Problem Relation Age of Onset    Diabetes Mother     Stroke Mother     Diabetes Father     Diabetes Sister     Diabetes Brother     Other Son         GSW    No Known Problems Sister       Medical Decision Making:   I have independently reviewed/ordered the following labs:    CBC with Differential:   Recent Labs     10/09/21  0821 10/11/21  0811   WBC 4.1 5.6   HGB 11.6* 10.2*   HCT 37.1 32.5*    280   LYMPHOPCT 44* 56*   MONOPCT 16* 13*     BMP:  Recent Labs     10/09/21  0821 10/11/21  0811    141   K 3.4* 3.7    103   CO2 22 27   BUN 5* 11   CREATININE 0.63 0.73     Hepatic Function Panel:   No results for input(s): PROT, LABALBU, BILIDIR, IBILI, BILITOT, ALKPHOS, ALT, AST in the last 72 hours. No results for input(s): RPR in the last 72 hours. No results for input(s): HIV in the last 72 hours. No results for input(s): BC in the last 72 hours. Lab Results   Component Value Date    CREATININE 0.73 10/11/2021    GLUCOSE 107 10/11/2021       Detailed results: Thank you for allowing us to participate in the care of this patient. Please call with questions. This note is created with the assistance of a speech recognition program.  While intending to generate adocument that actually reflects the content of the visit, the document can still have some errors including those of syntax and sound a like substitutions which may escape proof reading. It such instances, actual meaningcan be extrapolated by contextual diversion. Castillo Kay MD  Office: [phone]  Perfect serve / office [phone]        I have discussed the care of the patient, including pertinent history and exam findings,  with the resident. I have seen and examined the patient and the key elements of all parts of the encounter have been performed by me.   I agree with the assessment, plan and orders as documented by the resident.     Maylin Puga, Infectious Diseases

## 2021-10-12 NOTE — PROGRESS NOTES
Comprehensive Nutrition Assessment    Type and Reason for Visit:  Reassess    Nutrition Recommendations/Plan: Continue current diet. Encourage/monitor PO intakes as tolerated. Will monitor need for oral supplements with meals. Monitor labs, weights, and plan of care. Nutrition Assessment:  Pt s/p right index finger amputation yesterday. Pt has been restarted on an oral diet and is consuming at least 50% of meals. No recorded BM since admission. Labs reviewed: Glucose 341-393 mg/dL. Meds reviewed: Lantus, Humalog SS. Malnutrition Assessment:  Malnutrition Status: At risk for malnutrition    Context:  Acute Illness     Findings of the 6 clinical characteristics of malnutrition:  Energy Intake:  Mild decrease in energy intake  Weight Loss:  No significant weight loss    Body Fat Loss:  No significant body fat loss   Muscle Mass Loss:  No significant muscle mass loss  Fluid Accumulation:  No significant fluid accumulation   Strength:  Not Performed    Estimated Daily Nutrient Needs:  Energy (kcal):  MSJ x 1.1-1.2 = 0187-7566 kcals/day; Weight Used for Energy Requirements:  Current     Protein (g):  1.2-1.5 gm/kg = 70-90 gm pro/day; Weight Used for Protein Requirements:  Ideal        Fluid (ml/day):  2100 mL/day or per MD; Method Used for Fluid Requirements:  Other (Comment) (Mj Pillai)      Nutrition Related Findings:  Labs/Meds reviewed. No recorded BM since admission. 10/11: s/p right index finger amputation. Wounds:  Surgical Incision (to right index finger)       Current Nutrition Therapies:    ADULT DIET;  Regular; 4 carb choices (60 gm/meal)    Anthropometric Measures:  · Height: 5' 3\" (160 cm) (per chart)  · Current Body Weight: 230 lb (104.3 kg)   · Admission Body Weight: 230 lb (104.3 kg)    · Usual Body Weight: 244 lb 9.6 oz (110.9 kg) (10/20/20 bedscale per chart review)     · Ideal Body Weight: 115 lbs; % Ideal Body Weight 176.9 %   · BMI: 37.1  · BMI Categories: Obese Class 2 (BMI 35.0 -39.9)       Nutrition Diagnosis:   · Increased nutrient needs related to  (healing) as evidenced by wounds (s/p right index finger amputation)    Nutrition Interventions:   Food and/or Nutrient Delivery:  Continue Current Diet (Monitor need for oral supplements with meals.)  Nutrition Education/Counseling:  No recommendation at this time   Coordination of Nutrition Care:  Continue to monitor while inpatient    Goals:  Oral intakes to meet at least 75% of estimated nutrition needs.        Nutrition Monitoring and Evaluation:   Food/Nutrient Intake Outcomes:  Food and Nutrient Intake  Physical Signs/Symptoms Outcomes:  Biochemical Data, GI Status, Constipation, Fluid Status or Edema, Nutrition Focused Physical Findings, Skin, Weight     Discharge Planning:    Continue current diet     Electronically signed by Christophe Patterson RD, LD on 10/12/21 at 1:56 PM EDT    Contact: [phone]

## 2021-10-12 NOTE — DISCHARGE INSTR - COC
Continuity of Care Form    Patient Name: Danny Pelayo   :  1965  MRN:  [id number]    Admit date:  10/6/2021  Discharge date:  ***    Code Status Order: Full Code   Advance Directives:   Advance Care Flowsheet Documentation       Date/Time Healthcare Directive Type of Healthcare Directive Copy in [address] Agent's Name Healthcare Agent's Phone Number    10/11/21 1147  No, patient does not have an advance directive for healthcare treatment  --  --  --  --  --            Admitting Physician:  Janell Bernabe MD  PCP: Mishel Miller MD    Discharging Nurse: [facility] Unit/Room#: 8360/7625-23  Discharging Unit Phone Number: ***    Emergency Contact:   Extended Emergency Contact Information  Primary Emergency Contact: Leora Palmer  Address: NOT AVAILABLE   [address] Phone: [phone]  Relation: Child    Past Surgical History:  Past Surgical History:   Procedure Laterality Date    ABDOMEN SURGERY      drain tube    ABSCESS DRAINAGE      right buttock    CATARACT REMOVAL WITH IMPLANT Bilateral     CHEST TUBE INSERTION      FINGER AMPUTATION Left 2021    LEFT RING FINER AMPUTATION performed by Hilario Myers MD at [address] Right 10/11/2021    AMPUTATION RIGHT INDEX FINGER    FINGER AMPUTATION Right 10/11/2021    AMPUTATION RIGHT INDEX FINGER performed by Hilario Myers MD at [address] [place] [address] Right 2021    I&D INDEX FINGER performed by Hilario Myers MD at [address] [place] [address] Right 09/15/2021    I&D INDEX FINGER performed by Hilario Myers MD at [address] [place] [address] Bilateral        Immunization History:   Immunization History   Administered Date(s) Administered    COVID-19, [facility] [place], 30mcg/0.3mL 10/05/2021    Tdap (Boostrix, Adacel) 2017, 2020, 2021       Active Problems:  Patient Active Problem List   Diagnosis Code    IDDM (insulin dependent diabetes mellitus) [id number]    Lupus ([facility] M32.9    Bipolar disorder, mixed ([facility] F31.60    Post traumatic stress disorder (PTSD) F43.10    Acute cystitis with hematuria N30.01    Hyperglycemia R73.9    Suicidal ideation R45.851    Arthritis M19.90    Chronic back pain M54.9, G89.29    Acid reflux K21.9    History of stroke Z86.73    Polysubstance abuse ([facility] F19.10    Bipolar 1 disorder ([facility]) F31.9    Cocaine abuse ([facility] F14.10    Chest pain R07.9    Acute non-recurrent maxillary sinusitis J01.00    Acute respiratory failure with hypercapnia ([facility]) J96.02    Alcohol use disorder, severe, dependence ([facility]) F10.20    Asthma exacerbation attacks J45.901    Bilateral edema of lower extremity R60.0    Bipolar 1 disorder, depressed, severe ([facility]) F31.4    BMI 34.0-34.9,adult Z68.34    Cannabis use disorder, severe, dependence ([facility]) F12.20    Cocaine use disorder, severe, dependence ([facility]) F14.20    Dizziness R42    Dyslipidemia E78.5    Family history of colon cancer Z80.0    History of lupus [id number]    Homicidal ideation R45.850    Hypotension I95.9    Neuropathy G62.9    Normocytic anemia D64.9    Recurrent depression ([facility]) F33.9    Recurrent major depressive disorder, in partial remission ([facility] F33.41    Severe recurrent major depression with psychotic features ([facility]) F33.3    Severe recurrent major depression without psychotic features ([facility] F33.2    Upper GI bleed K92.2    Vitamin D deficiency E55.9    White matter changes [id number]    Gastroesophageal reflux disease K21.9    Stroke (cerebrum) ([facility]) I63.9    Rib lesion M89.9    Diabetes mellitus type 2 in obese ([facility]) E11.69, E66.9    YAEL (generalized anxiety disorder) F41.1    Posttraumatic stress disorder F43.10    Suicidal intent R45.851    Leg weakness, bilateral R29.898    Poorly controlled diabetes mellitus ([facility]) E11.65    Acute kidney injury ([facility] N17.9    Felon of finger L03.019    Osteomyelitis ([facility]) M86.9    Recurrent falls R29.6    Seasonal allergic rhinitis J30.2    Fibromyalgia M79.7    Tenosynovitis of finger M65.9    DKA (diabetic ketoacidoses) E11.10    Open wound of right index finger S61.200A    Adverse effect of COVID-19 vaccine T50. B95A    Wound dehiscence T81.30XA       Isolation/Infection:   Isolation            Contact          Patient Infection Status       Infection Onset Added Last Indicated Last Indicated By Review Planned Expiration Resolved Resolved By    MRSA 02/24/21 02/27/21 02/24/21 Culture, Anaerobic and Aerobic        Finger 2/2021    Resolved    COVID-19 Rule Out 03/13/21 03/13/21 03/13/21 COVID-19, Rapid (Ordered)   03/13/21 Rule-Out Test Resulted            Nurse Assessment:  Last Vital Signs: BP (!) 152/90   Pulse 71   Temp 97 °F (36.1 °C) (Oral)   Resp 18   Ht 5' 3\" (1.6 m) Comment: per chart  Wt 230 lb (104.3 kg) Comment: per chart  LMP  (LMP Unknown)   SpO2 95%   BMI 40.74 kg/m²     Last documented pain score (0-10 scale): Pain Level: 10  Last Weight:   Wt Readings from Last 1 Encounters:   10/11/21 230 lb (104.3 kg)     Mental Status:  {IP PT MENTAL STATUS:20030:::0}    IV Access:  { MINDA IV ACCESS:077232703:::0}    Nursing Mobility/ADLs:  Walking   {CHP DME ADLs:258079263:::0}  Transfer  {CHP DME ADLs:810421573:::0}  Bathing  {CHP DME ADLs:912441281:::0}  Dressing  {CHP DME ADLs:188859754:::0}  Toileting  {CHP DME ADLs:495264803:::0}  Feeding  {CHP DME ADLs:492970902:::0}  Med Admin  {CHP DME ADLs:768225174:::0}  Med Delivery   { MINDA MED Delivery:212035766:::0}    Wound Care Documentation and Therapy:        Elimination:  Continence: Bowel: {YES / GARCIA:94151}  Bladder: {YES / HQ:32010}  Urinary Catheter: {Urinary Catheter:152311824:::0}   Colostomy/Ileostomy/Ileal Conduit: {YES / DN:92486}       Date of Last BM: ***    Intake/Output Summary (Last 24 hours) at 10/12/2021 1249  Last data filed at 10/11/2021 1447  Gross per 24 hour   Intake 500 ml   Output 20 ml   Net 480 ml     I/O last 3 completed shifts:   In: 500 [I.V.:500]  Out: 20 [Blood:20]    Safety Concerns:     [address] MINDA Safety Concerns:868369210:::0}    Impairments/Disabilities:      [address] [facility] Impairments/Disabilities:688423445:::0}    Nutrition Therapy:  Current Nutrition Therapy:   [address] [facility] Diet List:713700953:::0}    Routes of Feeding: {CHP DME Other Feedings:150146804:::0}  Liquids: {Slp liquid thickness:99903}  Daily Fluid Restriction: {CHP DME Yes amt example:151299064:::0}  Last Modified Barium Swallow with Video (Video Swallowing Test): {Done Not Done MVDM:221125695:::4}    Treatments at the Time of Hospital Discharge:   Respiratory Treatments: ***  Oxygen Therapy:  {Therapy; copd oxygen:69811:::0}  Ventilator:    {[facility] Vent List:414433618:::0}    Rehab Therapies: {THERAPEUTIC INTERVENTION:3218465419}  Weight Bearing Status/Restrictions: [address] [facility] Weight Bearin:::0}  Other Medical Equipment (for information only, NOT a DME order):  {EQUIPMENT:595638478}  Other Treatments: ***    Patient's personal belongings (please select all that are sent with patient):  {CHP DME Belongings:963653122:::0}    RN SIGNATURE:  {Esignature:435094299:::0}    CASE MANAGEMENT/SOCIAL WORK SECTION    Inpatient Status Date: ***    Readmission Risk Assessment Score:  Readmission Risk              Risk of Unplanned Readmission:  36           Discharging to Facility/ Agency   Name:   Address:  Phone:  Fax:    Dialysis Facility (if applicable)   Name:  Address:  Dialysis Schedule:  Phone:  Fax:    / signature: {Esignature:991963569:::0}    PHYSICIAN SECTION    Prognosis: Good    Condition at Discharge: Stable    Rehab Potential (if transferring to Rehab): Good    Recommended Labs or Other Treatments After Discharge:     Physician Certification: I certify the above information and transfer of Libra Proctor  is necessary for the continuing treatment of the diagnosis listed and that she requires Home Care for greater 30 days.      Update Admission H&P: No change in H&P    PHYSICIAN SIGNATURE:  Electronically signed by Jens Ribeiro MD on 10/12/21 at 12:49 PM EDT

## 2021-10-12 NOTE — PLAN OF CARE
Discharge orders reviewed w/ patient, pt verbalized understanding, all questions answered, iv removed, pt discharged to home

## 2021-10-12 NOTE — PROGRESS NOTES
[facility]  Progress Note    Date:   10/12/2021  Patient name:  Lucie Ordonez  Date of admission:  10/6/2021  2:33 PM  MRN:   [id number]  YOB: 1965    SUBJECTIVE/Last 24 hours update:     Patient seen and examined at the bed side , no new acute events overnight. Postop day 1 status post right index finger amputation. Pt is complaining of continued pain despite being treated with fentanyl. Will increase pain medication from 4 hours to every 3 hours. We will also increase Percocets from 1 tablet to 2 tablets as needed. Patient would like to go home with visiting nurse. Plan for discharge, pending removal from plastic surgery. Notes from nursing staff and Consults had been reviewed, and the overnight progress had been checked with the nursing staff as well.     REVIEW OF SYSTEMS:      CONSTITUTIONAL:  no fevers, no headcahes  HEENT: No headaches, No nasal congestion, no difficulty swallowing  RESPIRATORY:negative for dyspnea, no wheezing, no Cough  CARDIOVASCULAR: negative for chest pain, no palpitations  GASTROINTESTINAL: no nausea, no vomiting, has constipation   GENITOURINARY: negative for dysuria, no hematuria   MUSCULOSKELETAL: no joint pains, no muscle aches, no swelling of joints or extremities  NEUROLOGICAL: No  Weakness or numbness      PAST MEDICAL HISTORY:      has a past medical history of Acid reflux, Acute cystitis with hematuria, Acute non-recurrent maxillary sinusitis, Asthma, Bipolar 1 disorder ([address] Bipolar disorder, mixed ([address] BMI 34.0-34.9,adult, Cannabis use disorder, severe, dependence ([address] Cerebrovascular accident (CVA) ([address] Chest pain, Chronic renal insufficiency, Cocaine abuse ([address] COVID-19 virus RNA test result unknown, DDD (degenerative disc disease), cervical, Diabetes mellitus ([address] Dizziness, Fibromyalgia, History of stroke, Homicidal ideation, Hyperglycemia, Hypertension, Hypotension, IDDM (insulin dependent diabetes mellitus), Lupus ([facility] Migraine, Neuropathy, Neuropathy, Polysubstance abuse ([facility] Post traumatic stress disorder (PTSD), Posttraumatic stress disorder, Recurrent depression ([facility] Recurrent major depressive disorder, in partial remission ([facility] Screening mammogram, encounter for, Severe recurrent major depression with psychotic features ([facility] [address] Severe recurrent major depression without psychotic features ([facility] [address] [facility] Stroke (cerebrum) ([facility] [address] [facility] Stroke ([facility] [address] Suicidal ideation, Suicidal intent, Vitamin D deficiency, and White matter changes. PAST SURGICAL HISTORY:      has a past surgical history that includes Abscess Drainage; chest tube insertion; Abdomen surgery; Cataract removal with implant (Bilateral); LASIK (Bilateral); Finger amputation (Left, 03/13/2021); Hand surgery (Right, 09/14/2021); Hand surgery (Right, 09/15/2021); and Finger amputation (Right, 10/11/2021). SOCIAL HISTORY:      reports that she has never smoked. She has never used smokeless tobacco. She reports previous alcohol use. She reports current drug use. Drugs: Marijuana and Cocaine. FAMILY HISTORY:     family history includes Diabetes in her brother, father, mother, and sister; No Known Problems in her sister; Other in her son; Stroke in her mother. HOME MEDICATIONS:      Prior to Admission medications    Medication Sig Start Date End Date Taking? Authorizing Provider   amoxicillin (AMOXIL) 500 MG capsule Take 2 capsules by mouth 2 times daily for 21 days 9/24/21 10/15/21  Maylin Aguilera MD   cetirizine (ZYRTEC) 10 MG tablet Take 1 tablet by mouth daily 8/9/21   Jose Macedo MD   insulin glargine (LANTUS SOLOSTAR) 100 UNIT/ML injection pen Inject 20 Units into the skin 2 times daily 8/9/21   Jose Macedo MD   pregabalin (LYRICA) 75 MG capsule Take 1 capsule by mouth 2 times daily for 30 days. Patient taking differently: Take 100 mg by mouth 2 times daily.   8/9/21 9/8/21  Dian Muhammad MD   pantoprazole (PROTONIX) 20 MG tablet Take 1 tablet by mouth 2 times daily (before meals) 7/22/21   Kasey Clements MD   metFORMIN (GLUCOPHAGE) 1000 MG tablet Take 1 tablet by mouth 2 times daily (with meals) 7/22/21   Ksaey Clements MD   venlafaxine (EFFEXOR XR) 150 MG extended release capsule Take 1 capsule by mouth daily (with breakfast) 7/22/21   Kasey Clements MD   dicyclomine (BENTYL) 10 MG capsule Take 1 capsule by mouth 4 times daily 7/12/21   Delgado Johnson MD   Insulin Pen Needle ([employer] PEN NEEDLES 31G) 31G X 8 MM MISC 1 each by Does not apply route daily 1/4/21   Kasey Clements MD    MG capsule TAKE 1 CAPSULE BY MOUTH TWICE DAILY 12/9/20   Kasey Clements MD   traZODone (DESYREL) 150 MG tablet Take 1 tablet by mouth nightly  Patient taking differently: Take 200 mg by mouth nightly  11/18/20   MD ELIZABETH WheelerSTYLE LITE strip 1 each by Does not apply route 3 times daily 11/11/20   Kasey Clements MD   FreeStyle Lancets MISC 1 each by Does not apply route 3 times daily 11/11/20   Kasey Clements MD   albuterol sulfate  (90 Base) MCG/ACT inhaler Inhale 2 puffs into the lungs every 4 hours as needed for Wheezing 10/20/20 9/22/21  Kasey Clements MD   FLOVENT  MCG/ACT inhaler Inhale 2 puffs into the lungs 2 times daily  Patient not taking: Reported on 9/22/2021 10/20/20   Kasey Clements MD   budesonide-formoterol (SYMBICORT) 80-4.5 MCG/ACT AERO Inhale 2 puffs into the lungs 2 times daily 10/20/20   Kasey Clements MD       ALLERGIES:     Bactrim [sulfamethoxazole-trimethoprim] and Adhesive tape      OBJECTIVE:       Vitals:    10/11/21 1730 10/11/21 1830 10/11/21 2106 10/12/21 0000   BP: 111/70 120/77 130/82 133/83   Pulse: 76 80 77 78   Resp: 18 16 19 18   Temp:   97 °F (36.1 °C) 97.6 °F (36.4 °C)   TempSrc:   Oral Oral   SpO2:  98% 96% 95%   Weight:       Height:             Intake/Output Summary (Last 24 hours) at 10/12/2021 0742  Last data filed at 10/11/2021 1447  Gross per 24 hour   Intake 500 ml   Output 20 ml   Net 480 ml       PHYSICAL EXAM:  General Appearance  Alert , awake , not in acute distress  HEENT - Head is normocephalic, atraumatic. Lungs - Bilateral equal air entry , no wheezes, rales or rhonchi, aeration good  Cardiovascular - Heart sounds are normal.  Regular rhythm, normal rate without murmur, gallop or rub.   Abdomen - Soft, nontender, nondistended, no masses or organomegaly  Neurologic - There are no new focal motor or sensory deficits  Skin - Right index finger and hand wrapped with ace bandage   Extremities - No cyanosis, clubbing or edema    DIAGNOSTICS:     Laboratory Testing:    Recent Results (from the past 24 hour(s))   BASIC METABOLIC PANEL    Collection Time: 10/11/21  8:11 AM   Result Value Ref Range    Glucose 107 (H) 70 - 99 mg/dL    BUN 11 6 - 20 mg/dL    CREATININE 0.73 0.50 - 0.90 mg/dL    Bun/Cre Ratio NOT REPORTED 9 - 20    Calcium 8.9 8.6 - 10.4 mg/dL    Sodium 141 135 - 144 mmol/L    Potassium 3.7 3.7 - 5.3 mmol/L    Chloride 103 98 - 107 mmol/L    CO2 27 20 - 31 mmol/L    Anion Gap 11 9 - 17 mmol/L    GFR Non-African American >60 >60 mL/min    GFR African American >60 >60 mL/min    GFR Comment          GFR Staging NOT REPORTED    CBC WITH AUTO DIFFERENTIAL    Collection Time: 10/11/21  8:11 AM   Result Value Ref Range    WBC 5.6 3.5 - 11.3 k/uL    RBC 3.24 (L) 3.95 - 5.11 m/uL    Hemoglobin 10.2 (L) 11.9 - 15.1 g/dL    Hematocrit 32.5 (L) 36.3 - 47.1 %    .3 82.6 - 102.9 fL    MCH 31.5 25.2 - 33.5 pg    MCHC 31.4 28.4 - 34.8 g/dL    RDW 12.9 11.8 - 14.4 %    Platelets 676 258 - 302 k/uL    MPV 10.1 8.1 - 13.5 fL    NRBC Automated 0.0 0.0 per 100 WBC    Differential Type NOT REPORTED     WBC Morphology NOT REPORTED     RBC Morphology NOT REPORTED     Platelet Estimate NOT REPORTED     Immature Granulocytes 1 (H) 0 %    Seg Neutrophils 24 (L) 36 - 66 %    Lymphocytes 56 (H) 24 - 44 %    Monocytes 13 (H) 1 - 7 %    Eosinophils % 4 1 - 4 %    Basophils 2 0 - 2 %    Absolute Immature Granulocyte 0.06 0.00 - 0.30 k/uL    Segs Absolute 1.34 (L) 1.8 - 7.7 k/uL    Absolute Lymph # 3.14 1.0 - 4.8 k/uL    Absolute Mono # 0.73 0.1 - 0.8 k/uL    Absolute Eos # 0.22 0.0 - 0.4 k/uL    Basophils Absolute 0.11 0.0 - 0.2 k/uL    Morphology Normal    POC Glucose Fingerstick    Collection Time: 10/11/21  2:54 PM   Result Value Ref Range    POC Glucose 77 65 - 105 mg/dL   POC Glucose Fingerstick    Collection Time: 10/11/21  8:17 PM   Result Value Ref Range    POC Glucose 402 (HH) 65 - 105 mg/dL   POC Glucose Fingerstick    Collection Time: 10/11/21 10:25 PM   Result Value Ref Range    POC Glucose 428 (HH) 65 - 105 mg/dL   POC Glucose Fingerstick    Collection Time: 10/12/21 12:01 AM   Result Value Ref Range    POC Glucose 393 (H) 65 - 105 mg/dL   POC Glucose Fingerstick    Collection Time: 10/12/21  1:00 AM   Result Value Ref Range    POC Glucose 380 (H) 65 - 105 mg/dL   POC Glucose Fingerstick    Collection Time: 10/12/21  2:28 AM   Result Value Ref Range    POC Glucose 375 (H) 65 - 105 mg/dL   BASIC METABOLIC PANEL    Collection Time: 10/12/21  4:57 AM   Result Value Ref Range    Glucose 399 (H) 70 - 99 mg/dL    BUN 15 6 - 20 mg/dL    CREATININE 0.82 0.50 - 0.90 mg/dL    Bun/Cre Ratio NOT REPORTED 9 - 20    Calcium 8.7 8.6 - 10.4 mg/dL    Sodium 136 135 - 144 mmol/L    Potassium 4.4 3.7 - 5.3 mmol/L    Chloride 100 98 - 107 mmol/L    CO2 20 20 - 31 mmol/L    Anion Gap 16 9 - 17 mmol/L    GFR Non-African American >60 >60 mL/min    GFR African American >60 >60 mL/min    GFR Comment          GFR Staging NOT REPORTED    CBC WITH AUTO DIFFERENTIAL    Collection Time: 10/12/21  4:57 AM   Result Value Ref Range    WBC 8.1 3.5 - 11.3 k/uL    RBC 3.37 (L) 3.95 - 5.11 m/uL    Hemoglobin 10.6 (L) 11.9 - 15.1 g/dL    Hematocrit 35.3 (L) 36.3 - 47.1 %    .7 (H) 82.6 - 102.9 fL    MCH 31.5 25.2 - 33.5 pg    MCHC 30.0 28.4 - 34.8 g/dL    RDW 12.9 11.8 - 14.4 % Platelets 113 782 - 898 k/uL    MPV 10.4 8.1 - 13.5 fL    NRBC Automated 0.0 0.0 per 100 WBC    Differential Type NOT REPORTED     Seg Neutrophils PENDING %    Lymphocytes PENDING %    Monocytes PENDING %    Eosinophils % PENDING %    Basophils PENDING %    Immature Granulocytes PENDING 0 %    Segs Absolute PENDING k/uL    Absolute Lymph # PENDING k/uL    Absolute Mono # PENDING k/uL    Absolute Eos # PENDING k/uL    Basophils Absolute PENDING 0.0 - 0.2 k/uL    Absolute Immature Granulocyte PENDING 0.00 - 0.30 k/uL    WBC Morphology NOT REPORTED     RBC Morphology MACROCYTOSIS PRESENT     Platelet Estimate NOT REPORTED        Current Facility-Administered Medications   Medication Dose Route Frequency Provider Last Rate Last Admin    diphenhydrAMINE (BENADRYL) tablet 25 mg  25 mg Oral Q6H PRN Shira Martinez MD   25 mg at 10/12/21 0728    polyethylene glycol (GLYCOLAX) packet 17 g  17 g Oral Daily Hilario Myers MD   17 g at 10/10/21 0916    calcium carbonate (TUMS) chewable tablet 500 mg  500 mg Oral TID PRN Hilario Myers MD   500 mg at 10/10/21 1330    oxyCODONE-acetaminophen (PERCOCET) 5-325 MG per tablet 1 tablet  1 tablet Oral Q4H PRN Hilario Myers MD   1 tablet at 10/12/21 0728    hydrALAZINE (APRESOLINE) injection 5 mg  5 mg IntraVENous Q6H PRN Hilario Myers MD        fentaNYL (SUBLIMAZE) injection 50 mcg  50 mcg IntraVENous Q4H PRN Hilario Myers MD   50 mcg at 10/12/21 0418    sennosides-docusate sodium (SENOKOT-S) 8.6-50 MG tablet 2 tablet  2 tablet Oral Daily PRN Hilario Myers MD   2 tablet at 10/12/21 0728    famotidine (PEPCID) tablet 20 mg  20 mg Oral BID Hilario Myers MD   20 mg at 10/11/21 2111    insulin glargine (LANTUS) injection vial 15 Units  15 Units SubCUTAneous BID Hilario Myers MD   15 Units at 10/11/21 2111    enoxaparin (LOVENOX) injection 30 mg  30 mg SubCUTAneous BID Hilario Myers MD        pregabalin (LYRICA) capsule 75 mg  75 mg Oral BID Flako Lau MD   75 mg at 10/11/21 2110    traZODone (DESYREL) tablet 200 mg  200 mg Oral Nightly Flako Lau MD   200 mg at 10/11/21 2111    venlafaxine (EFFEXOR XR) extended release capsule 150 mg  150 mg Oral Daily with breakfast Flako Lau MD   150 mg at 10/11/21 0944    sodium chloride flush 0.9 % injection 5-40 mL  5-40 mL IntraVENous 2 times per day Flako Lau MD   10 mL at 10/11/21 2117    sodium chloride flush 0.9 % injection 5-40 mL  5-40 mL IntraVENous PRN Flako Lau MD        0.9 % sodium chloride infusion  25 mL IntraVENous PRJAKE Lau MD 25 mL/hr at 10/11/21 1157 25 mL at 10/11/21 1157    potassium chloride (KLOR-CON M) extended release tablet 40 mEq  40 mEq Oral PRJAKE Lau MD        Or    potassium bicarb-citric acid (EFFER-K) effervescent tablet 40 mEq  40 mEq Oral PRJAKE Lau MD        Or    potassium chloride 10 mEq/100 mL IVPB (Peripheral Line)  10 mEq IntraVENous JORJE Lau MD        [facility]ron [facility] injection 4 mg  4 mg IntraVENous Q4H PRJAKE Lau MD   4 mg at 10/06/21 2334    acetaminophen (TYLENOL) tablet 650 mg  650 mg Oral Q6H JORJE Lau MD   650 mg at 10/08/21 1236    Or    acetaminophen (TYLENOL) suppository 650 mg  650 mg Rectal Q6H PRJAKE Lau MD        insulin lispro (HUMALOG) injection vial 0-12 Units  0-12 Units SubCUTAneous TID WC Flako Lau MD   4 Units at 10/10/21 1812    insulin lispro (HUMALOG) injection vial 0-6 Units  0-6 Units SubCUTAneous Nightly Flako Lau MD   6 Units at 10/11/21 2112    glucose (GLUTOSE) 40 % oral gel 15 g  15 g Oral JORJE Lau MD        dextrose 50 % IV solution  12.5 g IntraVENous JORJE Lau MD        glucagon (rDNA) injection 1 mg  1 mg IntraMUSCular PRJAKE Lau MD        dextrose 5 % solution  100 mL/hr IntraVENous PRN Flako Lau MD        ceftaroline fosamil (TEFLARO) 600 mg in dextrose 5 % 50 mL IVPB  600 mg IntraVENous Q12H Piero Queen MD   Stopped at 10/11/21 2215    fluconazole (DIFLUCAN) tablet 400 mg  400 mg Oral Q24H Piero Queen MD   400 mg at 10/11/21 2111       ASSESSMENT:     Active Problems:    Open wound of right index finger    Adverse effect of COVID-19 vaccine    Wound dehiscence  Resolved Problems:    * No resolved hospital problems. *      PLAN:     Discussed care plan with nurse after getting her input. Wound Dehiscence of right index finger  - Pain control with fentanyl  - Plastic surgery on board              - POD #1 s/p right index finger amputation   -Infectious disease on board              -Continue ceftaroline and Diflucan due to gram-positive cocci as well as Candida noted on previous wound culture.      Bipolar depression   - Continue home Effexor      Diabetes mellitus, type 2   - Medium dose sliding scale   - Lantus increased to patients home dose of 20 units BID   - hypoglycemia protocol in place   - Glucose 341      Asthma  - Continue home Flovent  - Continue home Symbicort       DVT prophylaxis: Lovenox 40 mg SC  GI prophylaxis: Famotidine 20 mg BID     Above plan discussed with the patient who agreed to the above plan      Plan will be discussed with the attending, Dr. Griffin Victoria MD  Family Medicine Resident  10/12/2021 7:42 AM            Please note that this chart was generated using voice recognition Dragon dictation software.   Although every effort was made to ensure the accuracy of this automated transcription, some errors in transcription may have occurred

## 2021-10-12 NOTE — PROGRESS NOTES
[facility] -   Infectious diseases evaluation  admission date 10/6/2021    reason for consultation:   Right hand infection    Impression :   Current:  · Right second finger infected soft tissue then osteomyelitis  · Right hand soft tissue infection  · Diabetes on insulin  · SLE    Other:  ·   Discussion / summary of stay / plan of care   ·   Recommendations   · Keep fluconazole po 1 more  Week  · daptomycin daily x 7 days start now  · Plan DC w outpt infusion visit daily x 7   · Current pus cx MSSA and kleb S ceftriaoxne  · Past pus cx MR SCN and candida glabrata  · Pt cant tolerate po AB  Ok for DC  FU office 2 weeks      Infection Control Recommendations   · [place] Precautions      Antimicrobial Stewardship Recommendations   · Simplification of therapy  · Targeted therapy  · Per Kg dosing      Coordination ofOutpatient Care:   · Estimated Length of IV antimicrobials:  · Patient will need Midline / picc Catheter Insertion:   · Patient will need SNF:  · Patient will need outpatient wound care:     History of Present Illness:   Initial history:  Mignon Haywood is a 49y.o.-year-old female very familiar to me who was admitted earlier this month to the hospital with a cellulitis and an abscess over the right hand mostly the right second finger, cultures grew gram-positive cocci as well as Candida and the patient was discharged on Diflucan and amoxicillin. The target was soft tissue infection and tendinitis, there was a concern of possible osteomyelitis. The patient demanded amputation before leaving. Ortho had followed her and planned to see her again in amputate the finger if it does not respond to antibiotic therapy. She comes back with the finger still hurting her, has not improved, and was planned to go for amputation of the morning. Her finger is very stiff the soft tissues are hard, she is not able to bend it  Picture as below.           Cultures from the finger 9/23 showing staph epidermides 2 strains methicillin-resistant and staph lugdunensis methicillin resistant     9/14 culture showed Candida glabrata  Interval changes  10/12/2021   Patient Vitals for the past 8 hrs:   BP Temp Temp src Pulse Resp SpO2   10/12/21 0807 (!) 152/90 97 °F (36.1 °C) Oral 71 18 95 %     10/8  Patient states pain in right hand has improved  Afebrile  Aerobic culture grew GPC in pains identified as Staph aureus and lactose-fermenting GNR  Surgery to be performed on Monday    10/9  Right hand continues to be painful  Remains afebrile  Aerobic wound culture grew GPC in pairs further identified as MSSA and lactose fermenting GNR further identified as Klebsiella Pneumoniae  Surgery scheduled for Monday    10/11  Alert appropriate post right finger excision. In good spirits pain controlled, labs reviewed  Culture right index 10/7 showing MSSA and Klebsiella both sensitive to ceftriaxone    10/12  Endorses post op right hand pain  Afebrile  WBC within normal limits  Culture right index 10/7 showing MSSA and Klebsiella both sensitive to ceftriaxone  Right hand sx wound seen and is dry and clean- but palmar area still has some pink tender spots - will need longer AB  Will switch to dapto and if tolerated, DC on it x 7 days w fluconazole  Pt cant tolerate po B and vomits easy on them. Summary of relevant labs:  Labs: WBC 5.5-> 4.1->5.6->8.1   CRP 34.1    Micro: Aerobic Culture (10/7) grew GPC in pairs identified as MSSA and lactose fermenting GNR further identified as Klebsiella Pneumoniae    Imaging:      I have personally reviewed the past medical history, past surgical history, medications, social history, and family history, and I haveupdated the database accordingly. Allergies:   Bactrim [sulfamethoxazole-trimethoprim] and Adhesive tape     Review of Systems:     Review of Systems   Constitutional: Positive for appetite change. Negative for activity change.    HENT: Negative for congestion. Eyes: Negative for pain, discharge and itching. Respiratory: Negative for shortness of breath. Cardiovascular: Negative for chest pain. Gastrointestinal: Negative for abdominal pain and nausea. Endocrine: Negative for cold intolerance. Genitourinary: Negative for dysuria. Musculoskeletal: Negative for arthralgias and neck stiffness. Skin: Positive for wound. Allergic/Immunologic: Negative for immunocompromised state. Neurological: Negative for dizziness, syncope and speech difficulty. Hematological: Negative for adenopathy. Psychiatric/Behavioral: Negative for agitation and confusion. The patient is not nervous/anxious. Physical Examination :       Physical Exam  Constitutional:       General: She is not in acute distress. Appearance: Normal appearance. She is not ill-appearing. HENT:      Head: Normocephalic. Mouth/Throat:      Mouth: Mucous membranes are moist.      Pharynx: Oropharynx is clear. Cardiovascular:      Rate and Rhythm: Normal rate and regular rhythm. Pulses: Normal pulses. Pulmonary:      Effort: Pulmonary effort is normal.      Breath sounds: Normal breath sounds. Abdominal:      Palpations: Abdomen is soft. There is no mass. Tenderness: There is no abdominal tenderness. Musculoskeletal:         General: Tenderness present. Cervical back: No rigidity or tenderness. Right lower leg: No edema. Left lower leg: No edema. Comments: Post-surgery tenderness in lateral right palm   Skin:     Findings: Lesion present. Comments: Right hand dressing intact   Neurological:      General: No focal deficit present. Mental Status: She is alert and oriented to person, place, and time. Mental status is at baseline.    Psychiatric:         Mood and Affect: Mood normal.         Past Medical History:     Past Medical History:   Diagnosis Date    Acid reflux 5/29/2017    Acute cystitis with hematuria 10/11/2016  Acute non-recurrent maxillary sinusitis 11/28/2017    Asthma     Bipolar 1 disorder ([address] 1/4/2018    Bipolar disorder, mixed ([address]     BMI 34.0-34.9,adult 11/28/2017    Cannabis use disorder, severe, dependence ([address] 12/19/2017    Cerebrovascular accident (CVA) ([address] 6/14/2017    Chest pain 11/5/2016    Chronic renal insufficiency     Cocaine abuse ([address] 1/5/2018    COVID-19 virus RNA test result unknown     DDD (degenerative disc disease), cervical     Diabetes mellitus ([address]     Dizziness 6/13/2017    Fibromyalgia     History of stroke 9/9/2017    Homicidal ideation 11/6/2017    Hyperglycemia     Hypertension     Hypotension 1/18/2019    IDDM (insulin dependent diabetes mellitus) 12/21/2015    Lupus ([address]     Migraine     Neuropathy     Neuropathy     Polysubstance abuse ([address] 9/17/2017    Post traumatic stress disorder (PTSD)     Posttraumatic stress disorder 5/29/2017    Recurrent depression ([address] 5/29/2017    Recurrent major depressive disorder, in partial remission ([address] 11/28/2017    Screening mammogram, encounter for 11/28/2017    Severe recurrent major depression with psychotic features ([address] 12/19/2017    Severe recurrent major depression without psychotic features ([address] 12/19/2017    Stroke (cerebrum) ([address] 6/14/2017    Stroke ([address]     per chart notes    Suicidal ideation     Suicidal intent 3/10/2017    Vitamin D deficiency 11/28/2017    White matter changes 6/13/2017       Past Surgical  History:     Past Surgical History:   Procedure Laterality Date    ABDOMEN SURGERY      drain tube    ABSCESS DRAINAGE      right buttock    CATARACT REMOVAL WITH IMPLANT Bilateral     CHEST TUBE INSERTION      FINGER AMPUTATION Left 03/13/2021    LEFT RING FINER AMPUTATION performed by Rickey Guo MD [facility] Right 10/11/2021    AMPUTATION RIGHT INDEX FINGER    HAND SURGERY Right 09/14/2021    I&D INDEX FINGER performed by Rebecca Almonte Kenneth Cannon MD at [address] [place] [address] HAND SURGERY Right 09/15/2021    I&D INDEX FINGER performed by Piero Queen MD at [address] [place] [address] LASIK Bilateral        Medications:      insulin glargine  20 Units SubCUTAneous BID    [START ON 10/13/2021] sennosides-docusate sodium  2 tablet Oral Daily    polyethylene glycol  17 g Oral Daily    famotidine  20 mg Oral BID    enoxaparin  30 mg SubCUTAneous BID    pregabalin  75 mg Oral BID    traZODone  200 mg Oral Nightly    venlafaxine  150 mg Oral Daily with breakfast    sodium chloride flush  5-40 mL IntraVENous 2 times per day    insulin lispro  0-12 Units SubCUTAneous TID WC    insulin lispro  0-6 Units SubCUTAneous Nightly    ceftaroline fosamil (TEFLARO) IVPB  600 mg IntraVENous Q12H    fluconazole  400 mg Oral Q24H       Social History:     Social History     Socioeconomic History    Marital status: Legally      Spouse name: Not on file    Number of children: Not on file    Years of education: Not on file    Highest education level: Not on file   Occupational History    Not on file   Tobacco Use    Smoking status: Never Smoker    Smokeless tobacco: Never Used   Vaping Use    Vaping Use: Never used   Substance and Sexual Activity    Alcohol use: Not Currently    Drug use: Yes     Types: Marijuana, Cocaine     Comment: + Cocaine 2/2021 also, see Care Everywhere UDS    Sexual activity: Not Currently     Partners: Male   Other Topics Concern    Not on file   Social History Narrative    Not on file     Social Determinants of Health     Financial Resource Strain: High Risk    Difficulty of Paying Living Expenses: Hard   Food Insecurity: Food Insecurity Present    Worried About Running Out of Food in the Last Year: Often true    Kelin of Food in the Last Year: Often true   Transportation Needs: Unmet Transportation Needs    Lack of Transportation (Medical):  Yes    Lack of Transportation (Non-Medical): Yes   Physical Activity: Inactive    Days of Exercise per Week: 0 days    Minutes of Exercise per Session: 0 min   Stress: Stress Concern Present    Feeling of Stress : Rather much   Social Connections:     Frequency of Communication with Friends and Family:     Frequency of Social Gatherings with Friends and Family:     Attends Hindu Services:     Active Member of Clubs or Organizations:     Attends Club or Organization Meetings:     Marital Status:    Intimate Partner Violence:     Fear of Current or Ex-Partner:     Emotionally Abused:     Physically Abused:     Sexually Abused:        Family History:     Family History   Problem Relation Age of Onset    Diabetes Mother     Stroke Mother     Diabetes Father     Diabetes Sister     Diabetes Brother     Other Son         GSW    No Known Problems Sister       Medical Decision Making:   I have independently reviewed/ordered the following labs:    CBC with Differential:   Recent Labs     10/11/21  0811 10/12/21  0457   WBC 5.6 8.1   HGB 10.2* 10.6*   HCT 32.5* 35.3*    353   LYMPHOPCT 56* PENDING   MONOPCT 13* PENDING     BMP:  Recent Labs     10/11/21  0811 10/12/21  0457    136   K 3.7 4.4    100   CO2 27 20   BUN 11 15   CREATININE 0.73 0.82     Hepatic Function Panel:   No results for input(s): PROT, LABALBU, BILIDIR, IBILI, BILITOT, ALKPHOS, ALT, AST in the last 72 hours. No results for input(s): RPR in the last 72 hours. No results for input(s): HIV in the last 72 hours. No results for input(s): BC in the last 72 hours. Lab Results   Component Value Date    CREATININE 0.82 10/12/2021    GLUCOSE 399 10/12/2021       Detailed results: Thank you for allowing us to participate in the care of this patient. Please call with questions.     This note is created with the assistance of a speech recognition program.  While intending to generate adocument that actually reflects the content of the visit, the document can still have some errors including those of syntax and sound a like substitutions which may escape proof reading. It such instances, actual meaningcan be extrapolated by contextual diversion. Jacques Cheema  Office: [phone]  Perfect serve / office [phone]        I have discussed the care of the patient, including pertinent history and exam findings,  with the resident. I have seen and examined the patient and the key elements of all parts of the encounter have been performed by me. I agree with the assessment, plan and orders as documented by the resident.     Maylin Puga, Infectious Diseases

## 2021-10-13 ENCOUNTER — HOSPITAL ENCOUNTER (OUTPATIENT)
Dept: ONCOLOGY | Age: 56
Discharge: HOME | End: 2021-10-13
Attending: INTERNAL MEDICINE | Admitting: INTERNAL MEDICINE
Payer: COMMERCIAL

## 2021-10-13 VITALS
TEMPERATURE: 98.4 F | DIASTOLIC BLOOD PRESSURE: 74 MMHG | OXYGEN SATURATION: 98 % | SYSTOLIC BLOOD PRESSURE: 110 MMHG | RESPIRATION RATE: 18 BRPM | HEART RATE: 77 BPM

## 2021-10-13 DIAGNOSIS — M86.00 ACUTE HEMATOGENOUS OSTEOMYELITIS, UNSPECIFIED SITE (HCC): ICD-10-CM

## 2021-10-13 LAB — SURGICAL PATHOLOGY REPORT: NORMAL

## 2021-10-13 PROCEDURE — 6360000002 HC RX W HCPCS: Performed by: INTERNAL MEDICINE

## 2021-10-13 PROCEDURE — 2580000003 HC RX 258: Performed by: INTERNAL MEDICINE

## 2021-10-13 PROCEDURE — 96365 THER/PROPH/DIAG IV INF INIT: CPT

## 2021-10-13 PROCEDURE — 76937 US GUIDE VASCULAR ACCESS: CPT

## 2021-10-13 RX ADMIN — DAPTOMYCIN 417.2 MG: 500 INJECTION, POWDER, LYOPHILIZED, FOR SOLUTION INTRAVENOUS at 11:02

## 2021-10-13 NOTE — OP NOTE
[facility] [address]                                OPERATIVE REPORT    PATIENT NAME: Alanna Garner                   :        1965  MED REC NO:   [id number]                             ROOM:       9216  ACCOUNT NO:   [de-identified]                           ADMIT DATE: 10/06/2021  PROVIDER:     Demi Hercules    DATE OF PROCEDURE:  10/11/2021    ATTENDING PROVIDER:  Alexander Ye MD    PREOPERATIVE DIAGNOSES:  History of cellulitis and osteomyelitis, right index  finger. POSTOPERATIVE DIAGNOSES:  History of cellulitis and osteomyelitis, right  index finger. PROCEDURE:  Ray amputation of right index finger. SURGEON:  Demi Hercules MD    ANESTHESIA:  General.    INDICATIONS:  The patient is a 82-year-old female who suffered severe  infection in the right index finger requiring debridement, it has failed  to heal adequately. The patient has significant pain and wishes the  finger amputated. The procedure, the risks, the benefits were discussed  with her. All questions were answered to her satisfaction. Consent was  signed. NARRATIVE SUMMARY:  The patient was brought to the operating suite,  placed under general anesthetic in the supine position. Right arm was  placed on the armboard. She was prepped and draped in usual sterile  fashion. Initially, incisions were marked along the border of the  viable skin. Marcaine plain 0.25% was instilled into the area as a  field block to help with postoperative pain control. Next, the skin  incisions were carried out and deepened through the full thickness of  the skin. At this point, the soft tissues around the  metacarpophalangeal joint were divided. The arteries were cauterized  and the nerves were cut back short so there would not be an area of  pressure.   Following this, the joint capsule was opened and the distal  finger from the metacarpophalangeal joint was removed. At this point,  incision was made in the periosteum of the metacarpal and utilizing  [facility], the distal half was stripped subperiosteally. The  distal half of the metacarpal was then removed with an oscillating saw  in an angled fashion, so there would not be a lump in the way of the  thumb opposing to the middle finger. Bovie cautery for hemostasis. The  wound was then copiously irrigated with saline. The soft tissues were  closed over the end of the bony stump with interrupted 4-0 Vicryl  sutures. The skin was then trimmed as necessary and closed with  interrupted mattress sutures of 4-0 black nylon. She was dressed with  Adaptic, 4x4s, and Kerlix wrap. The patient tolerated the procedure  well. Blood loss minimal.  Specimens, amputated finger. Complications,  none. The patient transferred to Recovery in stable condition.         Eber Potter    D: 10/12/2021 22:00:10       T: 10/12/2021 22:03:11     [email or web address]  Job#: [id number]     Doc#: [id number]    CC:

## 2021-10-13 NOTE — CARE COORDINATION
Thien Aguilar from [facility] notified writer that patient is accepted to their service. Writer informed that patient is already discharged and instructed Laurie to call the patient and notify her. Thien Aguilar agrees.

## 2021-10-18 DIAGNOSIS — E66.9 DIABETES MELLITUS TYPE 2 IN OBESE (HCC): ICD-10-CM

## 2021-10-18 DIAGNOSIS — E11.69 DIABETES MELLITUS TYPE 2 IN OBESE (HCC): ICD-10-CM

## 2021-10-18 NOTE — TELEPHONE ENCOUNTER
Metformin pending for refill   Health Maintenance   Topic Date Due    Pneumococcal 0-64 years Vaccine (1 of 2 - PPSV23) Never done    Diabetic retinal exam  Never done    Hepatitis B vaccine (1 of 3 - Risk 3-dose series) Never done    Cervical cancer screen  Never done    Colon cancer screen colonoscopy  Never done    Breast cancer screen  Never done    Shingles Vaccine (1 of 2) Never done    Flu vaccine (1) Never done    Diabetic microalbuminuria test  10/20/2021    COVID-19 Vaccine (2 - Pfizer 3-dose booster series) 10/26/2021    A1C test (Diabetic or Prediabetic)  12/16/2021    Lipid screen  12/30/2021    Diabetic foot exam  01/22/2022    DTaP/Tdap/Td vaccine (4 - Td or Tdap) 09/14/2031    Hepatitis C screen  Completed    HIV screen  Completed    Hepatitis A vaccine  Aged Out    Hib vaccine  Aged Out    Meningococcal (ACWY) vaccine  Aged Out             (applicable per patient's age: Cancer Screenings, Depression Screening, Fall Risk Screening, Immunizations)    Hemoglobin A1C (%)   Date Value   09/16/2021 14.2 (H)   08/09/2021 12.1   06/13/2021 11.1 (H)     Microalb/Crt.  Ratio (mcg/mg creat)   Date Value   10/20/2020 20     LDL Cholesterol (mg/dL)   Date Value   12/30/2020 146 (H)     AST (U/L)   Date Value   10/08/2021 16     ALT (U/L)   Date Value   10/08/2021 11     BUN (mg/dL)   Date Value   10/12/2021 15      (goal A1C is < 7)   (goal LDL is <100) need 30-50% reduction from baseline     BP Readings from Last 3 Encounters:   10/13/21 110/74   10/12/21 (!) 152/90   10/11/21 (!) 181/123    (goal /80)      All Future Testing planned in [facility]:  Lab Frequency Next Occurrence   Hemoglobin A1C Once 01/13/2022   VL LOWER EXTREMITY ARTERIAL SEGMENTAL PRESSURES W PPG Once 01/22/2022   Cologuard (For External Results Only) Once 07/12/2022   ARMAND Digital Screen Bilateral [XIM7868] Once 08/09/2022   Hepatic Function Panel 2 x per week    C-Reactive Protein weekly        Next Visit Date:  Future Appointments   Date Time Provider Jj Mary   10[facility]18/2021  3[facility]   10/19/2021 11:00 AM ST[facility] INFUSION BED 1 [facility] [address] [facility]   10/20/2021  2:15 PM Jess Arevalo MD INFT DISEASE [facility]            Patient Active Problem List:     IDDM (insulin dependent diabetes mellitus)     Lupus ([address]     Bipolar disorder, mixed ([address]     Post traumatic stress disorder (PTSD)     Acute cystitis with hematuria     Hyperglycemia     Suicidal ideation     Arthritis     Chronic back pain     Acid reflux     History of stroke     Polysubstance abuse ([address]     Bipolar 1 disorder (HCC)     Cocaine abuse ([address]     Chest pain     Acute non-recurrent maxillary sinusitis     Acute respiratory failure with hypercapnia (HCC)     Alcohol use disorder, severe, dependence ([address]     Asthma exacerbation attacks     Bilateral edema of lower extremity     Bipolar 1 disorder, depressed, severe (HCC)     BMI 34.0-34.9,adult     Cannabis use disorder, severe, dependence ([address]     Cocaine use disorder, severe, dependence ([address]     Dizziness     Dyslipidemia     Family history of colon cancer     History of lupus     Homicidal ideation     Hypotension     Neuropathy     Normocytic anemia     Recurrent depression (HCC)     Recurrent major depressive disorder, in partial remission (HCC)     Severe recurrent major depression with psychotic features (HCC)     Severe recurrent major depression without psychotic features ([address]     Upper GI bleed     Vitamin D deficiency     White matter changes     Gastroesophageal reflux disease     Stroke (cerebrum) ([address]     Rib lesion     Diabetes mellitus type 2 in obese (HCC)     YAEL (generalized anxiety disorder)     Posttraumatic stress disorder     Suicidal intent     Leg weakness, bilateral     Poorly controlled diabetes mellitus ([address]     Acute kidney injury ([address]     Felon of finger     Osteomyelitis (HCC)     Recurrent falls     Seasonal allergic rhinitis Fibromyalgia     Tenosynovitis of finger     DKA (diabetic ketoacidoses)     Open wound of right index finger     Adverse effect of COVID-19 vaccine     Wound dehiscence     Amputation finger

## 2021-10-19 ENCOUNTER — CARE COORDINATION (OUTPATIENT)
Dept: CARE COORDINATION | Age: 56
End: 2021-10-19
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
## 2021-10-19 NOTE — CARE COORDINATION
Initial Contact Social Work Note - Ambulatory  10/19/2021        Identified Needs:   Medical transportation  [address]:   [facility] [place], [facility]. phoned and left a message for [employer] with [employer].  HC provided patient's address, phone number, appointment date, time and location, as well   as her situation. HC informed Felix Schirmer via voicemail that we will work with patient and transportation to make sure that patient's appointments are scheduled in a timely fashion    for patient to be able to access her transportation. HC will follow up with [facility]   [place] transportation at the start of HC's day on 10/20/21. HC will also contact patient's   [employer] to advocate for patient.                 Leonel Hudson Next Steps: HC will follow up with [address] [place] [address] transportation   In the early morning to verify transportation arrangements      Goals Addressed    None

## 2021-10-20 ENCOUNTER — CARE COORDINATION (OUTPATIENT)
Dept: CARE COORDINATION | Age: 56
End: 2021-10-20

## 2021-10-20 NOTE — CARE COORDINATION
Initial Contact Social Work Note - Ambulatory  10/20/2021          Identified Needs:   Medical Transportation          Social Work Plan:   [facility]. made multiple calls to assist patient with getting to her appointment    [facility]  was unable to reach [employer], and [facility] was unable to transport patient for today.  Patient contacted her providers office and cancelled for today's appointment   and rescheduled for Wed. 10/27/21 @ 3:15p.  HC scheduled for patient's   [place]. Her pickup time will be for 1:30p or there about. Confirmation    # is [id number]  Next Steps: [facility] phoned the patient and shared this information and will also                        Send a text message with this information. Goals Addressed                 This Visit's Progress       Care Coordination     Community Resource Goal   Improving     Patient states that she needs medical transportation and also has food insecurities. Barriers: fear of failure, lack of motivation, financial, lack of support, overwhelmed by complexity of regimen, stress, time constraints, medication side effects, and lack of education    Plan for overcoming my barriers: [facility]. will assist patient with her medical transportation as needed  [facility] [employer]. will refer patient to [address] [place] [address] for assistance with getting addition foods in her home.     Confidence: 9/10    Anticipated Goal Completion Date: 08/30/21

## 2021-10-25 ENCOUNTER — CARE COORDINATION (OUTPATIENT)
Dept: CARE COORDINATION | Age: 56
End: 2021-10-25

## 2021-10-25 NOTE — CARE COORDINATION
[facility]. phoned patient to remind of her upcoming appointment. Patient stated  that she is aware but would like a reminder call on tomorrow since her appointment  will be on Wednesday, 10/27/21.       Plan of Care  [facility] will contact patient on 10/27/21

## 2021-10-26 ENCOUNTER — CARE COORDINATION (OUTPATIENT)
Dept: CARE COORDINATION | Age: 56
End: 2021-10-26

## 2021-10-26 NOTE — CARE COORDINATION
[facility]. phoned patient today to remind her of transportation for 1:30p on 10/27/21. She stated that she received a call today from the transportation system and the plans to be ready for her ride. HC called [employer] and requested food   for this patient. [facility] received a return call from [facility], who stated  the the patient should call on Monday, 11/01/21 and place an order that she   will receive on 11/02/21. HC texted this information to the patient and will remind  Her to make the call on Monday, 11/01/21.     Plan of Care  Follow up call on 11/01/21

## 2021-10-27 ENCOUNTER — TELEPHONE (OUTPATIENT)
Dept: INFECTIOUS DISEASES | Age: 56
End: 2021-10-27

## 2021-10-27 ENCOUNTER — OFFICE VISIT (OUTPATIENT)
Dept: INFECTIOUS DISEASES | Age: 56
End: 2021-10-27
Payer: COMMERCIAL

## 2021-10-27 VITALS
DIASTOLIC BLOOD PRESSURE: 86 MMHG | TEMPERATURE: 97.3 F | HEART RATE: 103 BPM | RESPIRATION RATE: 18 BRPM | HEIGHT: 63 IN | SYSTOLIC BLOOD PRESSURE: 126 MMHG | WEIGHT: 229 LBS | BODY MASS INDEX: 40.57 KG/M2 | OXYGEN SATURATION: 100 %

## 2021-10-27 DIAGNOSIS — M86.041 ACUTE HEMATOGENOUS OSTEOMYELITIS OF RIGHT HAND (HCC): Primary | ICD-10-CM

## 2021-10-27 PROCEDURE — 99214 OFFICE O/P EST MOD 30 MIN: CPT | Performed by: INTERNAL MEDICINE

## 2021-10-27 PROCEDURE — 1111F DSCHRG MED/CURRENT MED MERGE: CPT | Performed by: INTERNAL MEDICINE

## 2021-10-27 PROCEDURE — 1036F TOBACCO NON-USER: CPT | Performed by: INTERNAL MEDICINE

## 2021-10-27 PROCEDURE — 3017F COLORECTAL CA SCREEN DOC REV: CPT | Performed by: INTERNAL MEDICINE

## 2021-10-27 PROCEDURE — G8417 CALC BMI ABV UP PARAM F/U: HCPCS | Performed by: INTERNAL MEDICINE

## 2021-10-27 PROCEDURE — G8484 FLU IMMUNIZE NO ADMIN: HCPCS | Performed by: INTERNAL MEDICINE

## 2021-10-27 PROCEDURE — G8427 DOCREV CUR MEDS BY ELIG CLIN: HCPCS | Performed by: INTERNAL MEDICINE

## 2021-10-27 ASSESSMENT — ENCOUNTER SYMPTOMS
APNEA: 0
ABDOMINAL DISTENTION: 0
COLOR CHANGE: 0
EYE DISCHARGE: 0

## 2021-10-27 NOTE — TELEPHONE ENCOUNTER
Patient was seen in the office today, is stating she needs help getting food. I see that you have been working with her on this. Please reach out to her as soon as you can with whatever information you have.

## 2021-10-27 NOTE — PROGRESS NOTES
[facility] - Initial Consult Note  Today's Date: 10/27/2021    Impression :   · Right second finger infected soft tissue then osteomyelitis  · R 2nd finger amputation and infection resolved fully - 10/27/21 no active hand infection  · Right hand soft tissue infection  · Diabetes on insulin  · SLE    Recommendations   · FU w hand sx to remove the sutures- DR Dona Rodney  · See me PRN  ·    Diagnosis Orders   1. Acute hematogenous osteomyelitis of right hand (HCC)         Return if symptoms worsen or fail to improve. History of Present Illness:   Dwayne Martinez is a 49y.o.-year-old  female who presents with   Chief Complaint   Patient presents with    Follow-Up from Hospital     right hand second finger pt. sated shes in pain    · Right second finger infected soft tissue then osteomyelitis  · Right hand soft tissue infection  · Diabetes on insulin  · SLE    · Keep fluconazole po 1 more  Week  · daptomycin daily x 7 days start now  · Plan DC w outpt infusion visit daily x 7   1. Current pus cx MSSA and kleb S ceftriaoxne  2. Past pus cx MR SCN and candida glabrata  3. Pt cant tolerate po AB  · Ok for DC  FU office 2 weeks    Visit 10/27/21  Stopped the po AB at day 2 - but the hand wound is closed and non inflammed. Exam neg - she feels good  Sutures are still in place      I have personally reviewed the past medical history, past surgical history, medications, social history, and family history, and I haveupdated the database accordingly.   Past Medical History:     Past Medical History:   Diagnosis Date    Acid reflux 5/29/2017    Acute cystitis with hematuria 10/11/2016    Acute non-recurrent maxillary sinusitis 11/28/2017    Asthma     Bipolar 1 disorder ([address] 1/4/2018    Bipolar disorder, mixed ([address]     BMI 34.0-34.9,adult 11/28/2017    Cannabis use disorder, severe, dependence ([address] 12/19/2017    Cerebrovascular accident (CVA) ([address] 6/14/2017    Chest pain 11/5/2016    Chronic renal insufficiency     Cocaine abuse ([address] 1/5/2018    COVID-19 virus RNA test result unknown     DDD (degenerative disc disease), cervical     Diabetes mellitus ([address]     Dizziness 6/13/2017    Fibromyalgia     History of stroke 9/9/2017    Homicidal ideation 11/6/2017    Hyperglycemia     Hypertension     Hypotension 1/18/2019    IDDM (insulin dependent diabetes mellitus) 12/21/2015    Lupus ([address]     Migraine     Neuropathy     Neuropathy     Polysubstance abuse ([address] 9/17/2017    Post traumatic stress disorder (PTSD)     Posttraumatic stress disorder 5/29/2017    Recurrent depression ([address] 5/29/2017    Recurrent major depressive disorder, in partial remission ([address] 11/28/2017    Screening mammogram, encounter for 11/28/2017    Severe recurrent major depression with psychotic features ([address] 12/19/2017    Severe recurrent major depression without psychotic features ([address] 12/19/2017    Stroke (cerebrum) ([address] 6/14/2017    Stroke ([address]     per chart notes    Suicidal ideation     Suicidal intent 3/10/2017    Vitamin D deficiency 11/28/2017    White matter changes 6/13/2017       Past Surgical  History:     Past Surgical History:   Procedure Laterality Date    ABDOMEN SURGERY      drain tube    ABSCESS DRAINAGE      right buttock    CATARACT REMOVAL WITH IMPLANT Bilateral     CHEST TUBE INSERTION      FINGER AMPUTATION Left 03/13/2021    LEFT RING FINER AMPUTATION performed by Fl[facility] at [facility] Right 10/11/2021    AMPUTATION RIGHT INDEX FINGER    FINGER AMPUTATION Right 10/11/2021    AMPUTATION RIGHT INDEX FINGER performed by Flako Lau MD at [address] [place] [address] Right 09/14/2021    I&D INDEX FINGER performed by Flako Lau MD at [address] [place] [address] [facility] Right 09/15/2021    I&D INDEX FINGER performed by Flako Lau MD at [address] [place] [address] Bilateral        Medications:     Current Outpatient Medications:     metFORMIN (GLUCOPHAGE) 1000 MG tablet, TAKE 1 TABLET BY MOUTH 2 TIMES DAILY (WITH MEALS), Disp: 60 tablet, Rfl: 1    sennosides-docusate sodium (SENOKOT-S) 8.6-50 MG tablet, Take 2 tablets by mouth daily, Disp: 30 tablet, Rfl: 1    cetirizine (ZYRTEC) 10 MG tablet, Take 1 tablet by mouth daily, Disp: 90 tablet, Rfl: 1    insulin glargine (LANTUS SOLOSTAR) 100 UNIT/ML injection pen, Inject 20 Units into the skin 2 times daily, Disp: 5 pen, Rfl: 3    pantoprazole (PROTONIX) 20 MG tablet, Take 1 tablet by mouth 2 times daily (before meals), Disp: 30 tablet, Rfl: 3    venlafaxine (EFFEXOR XR) 150 MG extended release capsule, Take 1 capsule by mouth daily (with breakfast), Disp: 30 capsule, Rfl: 3    dicyclomine (BENTYL) 10 MG capsule, Take 1 capsule by mouth 4 times daily, Disp: 120 capsule, Rfl: 3    Insulin Pen Needle ([employer] PEN NEEDLES 31G) 31G X 8 MM MISC, 1 each by Does not apply route daily, Disp: 100 each, Rfl: 3    traZODone (DESYREL) 150 MG tablet, Take 1 tablet by mouth nightly (Patient taking differently: Take 200 mg by mouth nightly ), Disp: 14 tablet, Rfl: 5    FREESTYLE LITE strip, 1 each by Does not apply route 3 times daily, Disp: 100 each, Rfl: 5    FreeStyle Lancets MISC, 1 each by Does not apply route 3 times daily, Disp: 100 each, Rfl: 5    FLOVENT  MCG/ACT inhaler, Inhale 2 puffs into the lungs 2 times daily, Disp: 1 Inhaler, Rfl: 3    budesonide-formoterol (SYMBICORT) 80-4.5 MCG/ACT AERO, Inhale 2 puffs into the lungs 2 times daily, Disp: 1 Inhaler, Rfl: 5    pregabalin (LYRICA) 75 MG capsule, Take 1 capsule by mouth 2 times daily for 30 days.  (Patient taking differently: Take 100 mg by mouth 2 times daily. ), Disp: 60 capsule, Rfl: 0    albuterol sulfate  (90 Base) MCG/ACT inhaler, Inhale 2 puffs into the lungs every 4 hours as needed for Wheezing, Disp: 1 Inhaler, Rfl: 5      Social History:     Social History     Socioeconomic History    Marital status: Legally      Spouse name: Not on file    Number of children: Not on file    Years of education: Not on file    Highest education level: Not on file   Occupational History    Not on file   Tobacco Use    Smoking status: Never Smoker    Smokeless tobacco: Never Used   Vaping Use    Vaping Use: Never used   Substance and Sexual Activity    Alcohol use: Not Currently    Drug use: Yes     Types: Marijuana, Cocaine     Comment: + Cocaine 2/2021 also, see Care Everywhere UDS    Sexual activity: Not Currently     Partners: Male   Other Topics Concern    Not on file   Social History Narrative    Not on file     Social Determinants of Health     Financial Resource Strain: High Risk    Difficulty of Paying Living Expenses: Hard   Food Insecurity: Food Insecurity Present    Worried About Running Out of Food in the Last Year: Often true    Kelin of Food in the Last Year: Often true   Transportation Needs: Unmet Transportation Needs    Lack of Transportation (Medical):  Yes    Lack of Transportation (Non-Medical): Yes   Physical Activity: Inactive    Days of Exercise per Week: 0 days    Minutes of Exercise per Session: 0 min   Stress: Stress Concern Present    Feeling of Stress : Rather much   Social Connections:     Frequency of Communication with Friends and Family:     Frequency of Social Gatherings with Friends and Family:     Attends [employer] Services:     Active Member of Clubs or Organizations:     Attends Club or Organization Meetings:     Marital Status:    Intimate Partner Violence:     Fear of Current or Ex-Partner:     Emotionally Abused:     Physically Abused:     Sexually Abused:        Family History:     Family History   Problem Relation Age of Onset    Diabetes Mother     Stroke Mother     Diabetes Father     Diabetes Sister     Diabetes Brother     Other Son         GSW    No Known Problems Sister         Allergies:   Bactrim [sulfamethoxazole-trimethoprim] and Adhesive tape     Review of Systems:   Review of Systems   Constitutional: Negative for activity change. HENT: Negative for congestion. Eyes: Negative for discharge. Respiratory: Negative for apnea. Cardiovascular: Negative for chest pain. Gastrointestinal: Negative for abdominal distention. Endocrine: Negative for cold intolerance. Genitourinary: Negative for dysuria. Musculoskeletal: Negative for arthralgias. Skin: Positive for wound. Negative for color change. Allergic/Immunologic: Negative for immunocompromised state. Neurological: Negative for dizziness. Hematological: Negative for adenopathy. Psychiatric/Behavioral: Negative for agitation. Physical Examination :   Blood pressure 126/86, pulse 103, temperature 97.3 °F (36.3 °C), temperature source Temporal, resp. rate 18, height 5' 3\" (1.6 m), weight 229 lb (103.9 kg), SpO2 100 %. Physical Exam  Constitutional:       General: She is not in acute distress. Appearance: Normal appearance. HENT:      Head: Normocephalic and atraumatic. Nose: Nose normal.      Mouth/Throat:      Mouth: Mucous membranes are moist.   Eyes:      General: No scleral icterus. Conjunctiva/sclera: Conjunctivae normal.   Cardiovascular:      Rate and Rhythm: Normal rate and regular rhythm. Heart sounds: Normal heart sounds. No murmur heard. Pulmonary:      Effort: No respiratory distress. Breath sounds: Normal breath sounds. Abdominal:      General: There is no distension. Palpations: Abdomen is soft. Genitourinary:     Comments: No cullen  Musculoskeletal:         General: No swelling or deformity. Cervical back: Neck supple. No rigidity. Skin:     General: Skin is dry. Coloration: Skin is not jaundiced. Neurological:      General: No focal deficit present. Mental Status: She is alert and oriented to person, place, and time.    Psychiatric:         Mood and Affect: Mood normal.         Thought Content: Thought content normal.           Medical Decision Making:   I have independently reviewed/ordered the following labs:    CBCwith Differential:   Lab Results   Component Value Date    WBC 8.1 10/12/2021    WBC 5.6 10/11/2021    HGB 10.6 10/12/2021    HGB 10.2 10/11/2021    HCT 35.3 10/12/2021    HCT 32.5 10/11/2021     10/12/2021     10/11/2021    LYMPHOPCT 20 10/12/2021    LYMPHOPCT 56 10/11/2021    MONOPCT 5 10/12/2021    MONOPCT 13 10/11/2021     BMP:  Lab Results   Component Value Date     10/12/2021     10/11/2021    K 4.4 10/12/2021    K 3.7 10/11/2021     10/12/2021     10/11/2021    CO2 20 10/12/2021    CO2 27 10/11/2021    BUN 15 10/12/2021    BUN 11 10/11/2021    CREATININE 0.82 10/12/2021    CREATININE 0.73 10/11/2021    MG 1.8 09/15/2021    MG PLEASE DISREGARD RESULTS. SPECIMEN CONTAMINATED. 09/14/2021     Hepatic Function Panel:   Lab Results   Component Value Date    PROT 7.8 10/08/2021    PROT 7.5 09/22/2021    LABALBU 3.6 10/08/2021    LABALBU 2.9 09/22/2021    BILIDIR <0.08 10/08/2021    BILIDIR <0.08 09/22/2021    IBILI CANNOT BE CALCULATED 10/08/2021    IBILI CANNOT BE CALCULATED 09/22/2021    BILITOT <0.10 10/08/2021    BILITOT 0.16 09/22/2021    ALKPHOS 110 10/08/2021    ALKPHOS 100 09/22/2021    ALT 11 10/08/2021    ALT 6 09/22/2021    AST 16 10/08/2021    AST 21 09/22/2021     No results found for: RPR  No results found for: HIV  No results found for: [facility]  Lab Results   Component Value Date    MUCUS NOT REPORTED 05/17/2021    RBC 3.37 10/12/2021    TRICHOMONAS NOT REPORTED 05/17/2021    WBC 8.1 10/12/2021    YEAST MODERATE 05/17/2021    TURBIDITY CLEAR 06/07/2021     Lab Results   Component Value Date    CREATININE 0.82 10/12/2021    GLUCOSE 399 10/12/2021   you for allowing us to participate in the care of this patient. Please call with questions.       Kiara Eason MD  - Office: [phone]    Please note that this chart was generated using voice recognition Dragon dictation software. Although every effort was made to ensure the accuracy of this automated transcription, some errors in transcription mayhave occurred.

## 2021-10-28 ENCOUNTER — CARE COORDINATION (OUTPATIENT)
Dept: CARE COORDINATION | Age: 56
End: 2021-10-28

## 2021-10-28 NOTE — CARE COORDINATION
HC phoned [employer] to inquire of food programs that could possible deliver  to patient's home. After the representative had gone through several different programs,   there was no information that was appropriate for this patient. HC will contact this   patient and shared that the information with her that there is no real resources available. Patient stated that she'll make it, and that if her refrigerator hadn't gone out, she wouldn't   be in this situation. Patient stated that she will make it!     Plan of Care  [facility] [place], [facility]. will [facility] the [facility]

## 2021-10-29 ENCOUNTER — CARE COORDINATION (OUTPATIENT)
Dept: CARE COORDINATION | Age: 56
End: 2021-10-29

## 2021-10-29 NOTE — CARE COORDINATION
Maia Urban MD   sennosides-docusate sodium (SENOKOT-S) 8.6-50 MG tablet Take 2 tablets by mouth daily 10/13/21   Joe Domínguez MD   cetirizine (ZYRTEC) 10 MG tablet Take 1 tablet by mouth daily 8/9/21   Jose Macedo MD   insulin glargine (LANTUS SOLOSTAR) 100 UNIT/ML injection pen Inject 20 Units into the skin 2 times daily 8/9/21   Jose Macedo MD   pregabalin (LYRICA) 75 MG capsule Take 1 capsule by mouth 2 times daily for 30 days. Patient taking differently: Take 100 mg by mouth 2 times daily. 8/9/21 9/8/21  Paco Kraft MD   pantoprazole (PROTONIX) 20 MG tablet Take 1 tablet by mouth 2 times daily (before meals) 7/22/21   Joe Domínguez MD   venlafaxine (EFFEXOR XR) 150 MG extended release capsule Take 1 capsule by mouth daily (with breakfast) 7/22/21   Joe Domínguez MD   dicyclomine (BENTYL) 10 MG capsule Take 1 capsule by mouth 4 times daily 7/12/21   Leah Scherer MD   Insulin Pen Needle ([employer] PEN NEEDLES 31G) 31G X 8 MM MISC 1 each by Does not apply route daily 1/4/21   Joe Domínguez MD   traZODone (DESYREL) 150 MG tablet Take 1 tablet by mouth nightly  Patient taking differently: Take 200 mg by mouth nightly  11/18/20   MD TATUM Emanuel LITE strip 1 each by Does not apply route 3 times daily 11/11/20   MD Tatum Emanuel Lancets MISC 1 each by Does not apply route 3 times daily 11/11/20   Joe Domínguez MD   albuterol sulfate  (90 Base) MCG/ACT inhaler Inhale 2 puffs into the lungs every 4 hours as needed for Wheezing 10/20/20 9/22/21  Joe Domínguez MD   FLOVENT  MCG/ACT inhaler Inhale 2 puffs into the lungs 2 times daily 10/20/20   Joe Domínguez MD   budesonide-formoterol (SYMBICORT) 80-4.5 MCG/ACT AERO Inhale 2 puffs into the lungs 2 times daily 10/20/20   Joe Domínguez MD       No future appointments.

## 2021-11-01 ENCOUNTER — CARE COORDINATION (OUTPATIENT)
Dept: CARE COORDINATION | Age: 56
End: 2021-11-01

## 2021-11-03 ENCOUNTER — CARE COORDINATION (OUTPATIENT)
Dept: CARE COORDINATION | Age: 56
End: 2021-11-03

## 2021-11-03 PROBLEM — L02.511 ABSCESS OF RIGHT INDEX FINGER: Status: ACTIVE | Noted: 2021-11-03

## 2021-11-03 NOTE — CARE COORDINATION
HC attempted to contact this patient regarding her receiving groceries. HC also wanted to follow up with patient regarding scheduling a PCP  appointment.     Plan of Care  [facility]. will reach out to patient if there is no return call within this week

## 2021-11-04 ENCOUNTER — HOSPITAL ENCOUNTER (INPATIENT)
Age: 56
LOS: 4 days | Discharge: HOME HEALTH | DRG: 420 | End: 2021-11-08
Attending: EMERGENCY MEDICINE | Admitting: FAMILY MEDICINE
Payer: COMMERCIAL

## 2021-11-04 ENCOUNTER — APPOINTMENT (OUTPATIENT)
Dept: CT IMAGING | Age: 56
DRG: 420 | End: 2021-11-04
Payer: COMMERCIAL

## 2021-11-04 ENCOUNTER — CARE COORDINATION (OUTPATIENT)
Dept: CARE COORDINATION | Age: 56
End: 2021-11-04

## 2021-11-04 ENCOUNTER — APPOINTMENT (OUTPATIENT)
Dept: GENERAL RADIOLOGY | Age: 56
DRG: 420 | End: 2021-11-04
Payer: COMMERCIAL

## 2021-11-04 DIAGNOSIS — S68.119D AMPUTATION OF FINGER, SUBSEQUENT ENCOUNTER: ICD-10-CM

## 2021-11-04 DIAGNOSIS — E11.9 TYPE 2 DIABETES MELLITUS WITHOUT COMPLICATION, WITHOUT LONG-TERM CURRENT USE OF INSULIN (HCC): ICD-10-CM

## 2021-11-04 DIAGNOSIS — E66.9 DIABETES MELLITUS TYPE 2 IN OBESE (HCC): ICD-10-CM

## 2021-11-04 DIAGNOSIS — E11.69 DIABETES MELLITUS TYPE 2 IN OBESE (HCC): ICD-10-CM

## 2021-11-04 DIAGNOSIS — E11.10 DIABETIC KETOACIDOSIS WITHOUT COMA ASSOCIATED WITH TYPE 2 DIABETES MELLITUS (HCC): Primary | ICD-10-CM

## 2021-11-04 LAB
ABSOLUTE EOS #: 0.13 K/UL (ref 0–0.44)
ABSOLUTE IMMATURE GRANULOCYTE: <0.03 K/UL (ref 0–0.3)
ABSOLUTE LYMPH #: 1.88 K/UL (ref 1.1–3.7)
ABSOLUTE MONO #: 0.66 K/UL (ref 0.1–1.2)
ALLEN TEST: ABNORMAL
AMPHETAMINE SCREEN URINE: NEGATIVE
ANION GAP SERPL CALCULATED.3IONS-SCNC: 16 MMOL/L (ref 9–17)
ANION GAP SERPL CALCULATED.3IONS-SCNC: 19 MMOL/L (ref 9–17)
ANION GAP: 16 MMOL/L (ref 7–16)
BARBITURATE SCREEN URINE: NEGATIVE
BASOPHILS # BLD: 1 % (ref 0–2)
BASOPHILS ABSOLUTE: 0.04 K/UL (ref 0–0.2)
BENZODIAZEPINE SCREEN, URINE: NEGATIVE
BETA-HYDROXYBUTYRATE: 3.13 MMOL/L (ref 0.02–0.27)
BILIRUBIN URINE: NEGATIVE
BUN BLDV-MCNC: 15 MG/DL (ref 6–20)
BUN BLDV-MCNC: 16 MG/DL (ref 6–20)
BUN/CREAT BLD: ABNORMAL (ref 9–20)
BUN/CREAT BLD: ABNORMAL (ref 9–20)
BUPRENORPHINE URINE: ABNORMAL
CALCIUM SERPL-MCNC: 10 MG/DL (ref 8.6–10.4)
CALCIUM SERPL-MCNC: 8.9 MG/DL (ref 8.6–10.4)
CANNABINOID SCREEN URINE: NEGATIVE
CHLORIDE BLD-SCNC: 96 MMOL/L (ref 98–107)
CHLORIDE BLD-SCNC: 96 MMOL/L (ref 98–107)
CO2: 20 MMOL/L (ref 20–31)
CO2: 20 MMOL/L (ref 20–31)
COCAINE METABOLITE, URINE: POSITIVE
COLOR: YELLOW
COMMENT UA: ABNORMAL
CREAT SERPL-MCNC: 1.11 MG/DL (ref 0.5–0.9)
CREAT SERPL-MCNC: 1.33 MG/DL (ref 0.5–0.9)
DIFFERENTIAL TYPE: ABNORMAL
EOSINOPHILS RELATIVE PERCENT: 2 % (ref 1–4)
FIO2: ABNORMAL
GFR AFRICAN AMERICAN: 50 ML/MIN
GFR AFRICAN AMERICAN: >60 ML/MIN
GFR NON-AFRICAN AMERICAN: 42 ML/MIN
GFR NON-AFRICAN AMERICAN: 51 ML/MIN
GFR NON-AFRICAN AMERICAN: >60 ML/MIN
GFR SERPL CREATININE-BSD FRML MDRD: >60 ML/MIN
GFR SERPL CREATININE-BSD FRML MDRD: ABNORMAL ML/MIN/{1.73_M2}
GFR SERPL CREATININE-BSD FRML MDRD: NORMAL ML/MIN/{1.73_M2}
GLUCOSE BLD-MCNC: 226 MG/DL (ref 65–105)
GLUCOSE BLD-MCNC: 342 MG/DL (ref 65–105)
GLUCOSE BLD-MCNC: 382 MG/DL (ref 65–105)
GLUCOSE BLD-MCNC: 383 MG/DL (ref 70–99)
GLUCOSE BLD-MCNC: 416 MG/DL (ref 74–100)
GLUCOSE BLD-MCNC: 540 MG/DL (ref 70–99)
GLUCOSE BLD-MCNC: 545 MG/DL (ref 65–105)
GLUCOSE URINE: ABNORMAL
HCO3 VENOUS: 19.8 MMOL/L (ref 22–29)
HCT VFR BLD CALC: 32.6 % (ref 36.3–47.1)
HEMOGLOBIN: 10.6 G/DL (ref 11.9–15.1)
IMMATURE GRANULOCYTES: 0 %
KETONES, URINE: ABNORMAL
LACTIC ACID, WHOLE BLOOD: 1.7 MMOL/L (ref 0.7–2.1)
LEUKOCYTE ESTERASE, URINE: NEGATIVE
LYMPHOCYTES # BLD: 29 % (ref 24–43)
MAGNESIUM: 2 MG/DL (ref 1.6–2.6)
MCH RBC QN AUTO: 31.6 PG (ref 25.2–33.5)
MCHC RBC AUTO-ENTMCNC: 32.5 G/DL (ref 28.4–34.8)
MCV RBC AUTO: 97.3 FL (ref 82.6–102.9)
MDMA URINE: ABNORMAL
METHADONE SCREEN, URINE: NEGATIVE
METHAMPHETAMINE, URINE: ABNORMAL
MODE: ABNORMAL
MONOCYTES # BLD: 10 % (ref 3–12)
MYOGLOBIN: 52 NG/ML (ref 25–58)
NEGATIVE BASE EXCESS, VEN: 6 (ref 0–2)
NITRITE, URINE: NEGATIVE
NRBC AUTOMATED: 0 PER 100 WBC
O2 DEVICE/FLOW/%: ABNORMAL
O2 SAT, VEN: 17 % (ref 60–85)
OPIATES, URINE: NEGATIVE
OXYCODONE SCREEN URINE: NEGATIVE
PATIENT TEMP: ABNORMAL
PCO2, VEN: 37.6 MM HG (ref 41–51)
PDW BLD-RTO: 12.6 % (ref 11.8–14.4)
PH UA: 5.5 (ref 5–8)
PH VENOUS: 7.33 (ref 7.32–7.43)
PHENCYCLIDINE, URINE: NEGATIVE
PHOSPHORUS: 4.4 MG/DL (ref 2.6–4.5)
PLATELET # BLD: 345 K/UL (ref 138–453)
PLATELET ESTIMATE: ABNORMAL
PMV BLD AUTO: 11.3 FL (ref 8.1–13.5)
PO2, VEN: 15 MM HG (ref 30–50)
POC BUN: 14 MG/DL (ref 8–26)
POC CHLORIDE: 100 MMOL/L (ref 98–107)
POC CREATININE: 0.79 MG/DL (ref 0.51–1.19)
POC HEMATOCRIT: 29 % (ref 36–46)
POC HEMOGLOBIN: 9.8 G/DL (ref 12–16)
POC IONIZED CALCIUM: 1.19 MMOL/L (ref 1.15–1.33)
POC LACTIC ACID: 7.04 MMOL/L (ref 0.56–1.39)
POC PCO2 TEMP: ABNORMAL MM HG
POC PH TEMP: ABNORMAL
POC PO2 TEMP: ABNORMAL MM HG
POC POTASSIUM: 4.2 MMOL/L (ref 3.5–4.5)
POC SODIUM: 136 MMOL/L (ref 138–146)
POC TCO2: 21 MMOL/L (ref 22–30)
POSITIVE BASE EXCESS, VEN: ABNORMAL (ref 0–3)
POTASSIUM SERPL-SCNC: 3.8 MMOL/L (ref 3.7–5.3)
POTASSIUM SERPL-SCNC: 4.1 MMOL/L (ref 3.7–5.3)
PROPOXYPHENE, URINE: ABNORMAL
PROTEIN UA: NEGATIVE
RBC # BLD: 3.35 M/UL (ref 3.95–5.11)
RBC # BLD: ABNORMAL 10*6/UL
SAMPLE SITE: ABNORMAL
SEG NEUTROPHILS: 58 % (ref 36–65)
SEGMENTED NEUTROPHILS ABSOLUTE COUNT: 3.81 K/UL (ref 1.5–8.1)
SODIUM BLD-SCNC: 132 MMOL/L (ref 135–144)
SODIUM BLD-SCNC: 135 MMOL/L (ref 135–144)
SPECIFIC GRAVITY UA: 1.02 (ref 1–1.03)
TEST INFORMATION: ABNORMAL
TOTAL CK: 245 U/L (ref 26–192)
TOTAL CO2, VENOUS: ABNORMAL MMOL/L (ref 23–30)
TRICYCLIC ANTIDEPRESSANTS, UR: ABNORMAL
TURBIDITY: CLEAR
URINE HGB: NEGATIVE
UROBILINOGEN, URINE: NORMAL
WBC # BLD: 6.5 K/UL (ref 3.5–11.3)
WBC # BLD: ABNORMAL 10*3/UL

## 2021-11-04 PROCEDURE — 83874 ASSAY OF MYOGLOBIN: CPT

## 2021-11-04 PROCEDURE — 99285 EMERGENCY DEPT VISIT HI MDM: CPT

## 2021-11-04 PROCEDURE — 85014 HEMATOCRIT: CPT

## 2021-11-04 PROCEDURE — 80048 BASIC METABOLIC PNL TOTAL CA: CPT

## 2021-11-04 PROCEDURE — 83036 HEMOGLOBIN GLYCOSYLATED A1C: CPT

## 2021-11-04 PROCEDURE — 6360000002 HC RX W HCPCS: Performed by: STUDENT IN AN ORGANIZED HEALTH CARE EDUCATION/TRAINING PROGRAM

## 2021-11-04 PROCEDURE — 80051 ELECTROLYTE PANEL: CPT

## 2021-11-04 PROCEDURE — 94664 DEMO&/EVAL PT USE INHALER: CPT

## 2021-11-04 PROCEDURE — 81003 URINALYSIS AUTO W/O SCOPE: CPT

## 2021-11-04 PROCEDURE — 85025 COMPLETE CBC W/AUTO DIFF WBC: CPT

## 2021-11-04 PROCEDURE — 82010 KETONE BODYS QUAN: CPT

## 2021-11-04 PROCEDURE — 6360000002 HC RX W HCPCS

## 2021-11-04 PROCEDURE — 84520 ASSAY OF UREA NITROGEN: CPT

## 2021-11-04 PROCEDURE — 82803 BLOOD GASES ANY COMBINATION: CPT

## 2021-11-04 PROCEDURE — 94640 AIRWAY INHALATION TREATMENT: CPT

## 2021-11-04 PROCEDURE — 83735 ASSAY OF MAGNESIUM: CPT

## 2021-11-04 PROCEDURE — 93005 ELECTROCARDIOGRAM TRACING: CPT | Performed by: STUDENT IN AN ORGANIZED HEALTH CARE EDUCATION/TRAINING PROGRAM

## 2021-11-04 PROCEDURE — 82550 ASSAY OF CK (CPK): CPT

## 2021-11-04 PROCEDURE — 70450 CT HEAD/BRAIN W/O DYE: CPT

## 2021-11-04 PROCEDURE — 2580000003 HC RX 258: Performed by: STUDENT IN AN ORGANIZED HEALTH CARE EDUCATION/TRAINING PROGRAM

## 2021-11-04 PROCEDURE — 2060000000 HC ICU INTERMEDIATE R&B

## 2021-11-04 PROCEDURE — 6370000000 HC RX 637 (ALT 250 FOR IP): Performed by: STUDENT IN AN ORGANIZED HEALTH CARE EDUCATION/TRAINING PROGRAM

## 2021-11-04 PROCEDURE — 2580000003 HC RX 258

## 2021-11-04 PROCEDURE — 82947 ASSAY GLUCOSE BLOOD QUANT: CPT

## 2021-11-04 PROCEDURE — 84100 ASSAY OF PHOSPHORUS: CPT

## 2021-11-04 PROCEDURE — 6370000000 HC RX 637 (ALT 250 FOR IP)

## 2021-11-04 PROCEDURE — 80307 DRUG TEST PRSMV CHEM ANLYZR: CPT

## 2021-11-04 PROCEDURE — 82565 ASSAY OF CREATININE: CPT

## 2021-11-04 PROCEDURE — 82330 ASSAY OF CALCIUM: CPT

## 2021-11-04 PROCEDURE — 71046 X-RAY EXAM CHEST 2 VIEWS: CPT

## 2021-11-04 PROCEDURE — 83605 ASSAY OF LACTIC ACID: CPT

## 2021-11-04 RX ORDER — SODIUM CHLORIDE 450 MG/100ML
INJECTION, SOLUTION INTRAVENOUS
Status: COMPLETED
Start: 2021-11-04 | End: 2021-11-04

## 2021-11-04 RX ORDER — BUDESONIDE AND FORMOTEROL FUMARATE DIHYDRATE 80; 4.5 UG/1; UG/1
2 AEROSOL RESPIRATORY (INHALATION) 2 TIMES DAILY
Status: DISCONTINUED | OUTPATIENT
Start: 2021-11-04 | End: 2021-11-08 | Stop reason: HOSPADM

## 2021-11-04 RX ORDER — ONDANSETRON 2 MG/ML
4 INJECTION INTRAMUSCULAR; INTRAVENOUS ONCE
Status: COMPLETED | OUTPATIENT
Start: 2021-11-04 | End: 2021-11-04

## 2021-11-04 RX ORDER — VENLAFAXINE HYDROCHLORIDE 150 MG/1
150 CAPSULE, EXTENDED RELEASE ORAL
Status: DISCONTINUED | OUTPATIENT
Start: 2021-11-05 | End: 2021-11-08 | Stop reason: HOSPADM

## 2021-11-04 RX ORDER — ALBUTEROL SULFATE 90 UG/1
2 AEROSOL, METERED RESPIRATORY (INHALATION) EVERY 4 HOURS PRN
Status: DISCONTINUED | OUTPATIENT
Start: 2021-11-04 | End: 2021-11-08 | Stop reason: HOSPADM

## 2021-11-04 RX ORDER — NICOTINE POLACRILEX 4 MG
15 LOZENGE BUCCAL PRN
Status: DISCONTINUED | OUTPATIENT
Start: 2021-11-04 | End: 2021-11-08 | Stop reason: HOSPADM

## 2021-11-04 RX ORDER — SODIUM CHLORIDE 450 MG/100ML
INJECTION, SOLUTION INTRAVENOUS CONTINUOUS
Status: DISCONTINUED | OUTPATIENT
Start: 2021-11-04 | End: 2021-11-07

## 2021-11-04 RX ORDER — DEXTROSE MONOHYDRATE 50 MG/ML
100 INJECTION, SOLUTION INTRAVENOUS PRN
Status: DISCONTINUED | OUTPATIENT
Start: 2021-11-04 | End: 2021-11-07

## 2021-11-04 RX ORDER — DEXTROSE AND SODIUM CHLORIDE 5; .45 G/100ML; G/100ML
INJECTION, SOLUTION INTRAVENOUS CONTINUOUS PRN
Status: DISCONTINUED | OUTPATIENT
Start: 2021-11-04 | End: 2021-11-07

## 2021-11-04 RX ORDER — SODIUM CHLORIDE 9 MG/ML
INJECTION, SOLUTION INTRAVENOUS CONTINUOUS
Status: DISCONTINUED | OUTPATIENT
Start: 2021-11-04 | End: 2021-11-04

## 2021-11-04 RX ORDER — MAGNESIUM SULFATE 1 G/100ML
1000 INJECTION INTRAVENOUS PRN
Status: DISCONTINUED | OUTPATIENT
Start: 2021-11-04 | End: 2021-11-07

## 2021-11-04 RX ORDER — INSULIN GLARGINE 100 [IU]/ML
20 INJECTION, SOLUTION SUBCUTANEOUS 2 TIMES DAILY
Status: DISCONTINUED | OUTPATIENT
Start: 2021-11-04 | End: 2021-11-05

## 2021-11-04 RX ORDER — DICYCLOMINE HYDROCHLORIDE 10 MG/1
10 CAPSULE ORAL 4 TIMES DAILY
Status: DISCONTINUED | OUTPATIENT
Start: 2021-11-04 | End: 2021-11-08 | Stop reason: HOSPADM

## 2021-11-04 RX ORDER — SODIUM CHLORIDE, SODIUM LACTATE, POTASSIUM CHLORIDE, AND CALCIUM CHLORIDE .6; .31; .03; .02 G/100ML; G/100ML; G/100ML; G/100ML
1000 INJECTION, SOLUTION INTRAVENOUS ONCE
Status: COMPLETED | OUTPATIENT
Start: 2021-11-04 | End: 2021-11-04

## 2021-11-04 RX ORDER — POTASSIUM CHLORIDE 7.45 MG/ML
10 INJECTION INTRAVENOUS PRN
Status: DISCONTINUED | OUTPATIENT
Start: 2021-11-04 | End: 2021-11-05

## 2021-11-04 RX ORDER — POLYETHYLENE GLYCOL 3350 17 G/17G
17 POWDER, FOR SOLUTION ORAL DAILY PRN
Status: DISCONTINUED | OUTPATIENT
Start: 2021-11-04 | End: 2021-11-08 | Stop reason: HOSPADM

## 2021-11-04 RX ORDER — PANTOPRAZOLE SODIUM 20 MG/1
20 TABLET, DELAYED RELEASE ORAL
Status: DISCONTINUED | OUTPATIENT
Start: 2021-11-05 | End: 2021-11-08 | Stop reason: HOSPADM

## 2021-11-04 RX ORDER — DEXTROSE MONOHYDRATE 25 G/50ML
12.5 INJECTION, SOLUTION INTRAVENOUS PRN
Status: DISCONTINUED | OUTPATIENT
Start: 2021-11-04 | End: 2021-11-08 | Stop reason: HOSPADM

## 2021-11-04 RX ORDER — PREGABALIN 75 MG/1
75 CAPSULE ORAL 2 TIMES DAILY
Status: DISCONTINUED | OUTPATIENT
Start: 2021-11-04 | End: 2021-11-08 | Stop reason: HOSPADM

## 2021-11-04 RX ORDER — MORPHINE SULFATE 4 MG/ML
2 INJECTION, SOLUTION INTRAMUSCULAR; INTRAVENOUS EVERY 4 HOURS PRN
Status: DISCONTINUED | OUTPATIENT
Start: 2021-11-04 | End: 2021-11-08 | Stop reason: HOSPADM

## 2021-11-04 RX ADMIN — MORPHINE SULFATE 2 MG: 4 INJECTION, SOLUTION INTRAMUSCULAR; INTRAVENOUS at 19:55

## 2021-11-04 RX ADMIN — ENOXAPARIN SODIUM 40 MG: 100 INJECTION SUBCUTANEOUS at 19:55

## 2021-11-04 RX ADMIN — ONDANSETRON 4 MG: 2 INJECTION INTRAMUSCULAR; INTRAVENOUS at 16:25

## 2021-11-04 RX ADMIN — SODIUM CHLORIDE, POTASSIUM CHLORIDE, SODIUM LACTATE AND CALCIUM CHLORIDE 1000 ML: 600; 310; 30; 20 INJECTION, SOLUTION INTRAVENOUS at 16:25

## 2021-11-04 RX ADMIN — DICYCLOMINE HYDROCHLORIDE 10 MG: 10 CAPSULE ORAL at 19:55

## 2021-11-04 RX ADMIN — ALBUTEROL SULFATE 2 PUFF: 90 AEROSOL, METERED RESPIRATORY (INHALATION) at 20:50

## 2021-11-04 RX ADMIN — BUDESONIDE AND FORMOTEROL FUMARATE DIHYDRATE 2 PUFF: 80; 4.5 AEROSOL RESPIRATORY (INHALATION) at 20:50

## 2021-11-04 RX ADMIN — SODIUM CHLORIDE 500 ML: 4.5 INJECTION, SOLUTION INTRAVENOUS at 18:53

## 2021-11-04 ASSESSMENT — PAIN SCALES - GENERAL
PAINLEVEL_OUTOF10: 8
PAINLEVEL_OUTOF10: 9
PAINLEVEL_OUTOF10: 8
PAINLEVEL_OUTOF10: 8

## 2021-11-04 ASSESSMENT — ENCOUNTER SYMPTOMS
BACK PAIN: 1
DIARRHEA: 0
SORE THROAT: 0
CONSTIPATION: 0
NAUSEA: 1
COUGH: 0
SHORTNESS OF BREATH: 1
VOMITING: 0
ABDOMINAL PAIN: 1

## 2021-11-04 ASSESSMENT — PAIN DESCRIPTION - ORIENTATION
ORIENTATION: LEFT
ORIENTATION: LEFT

## 2021-11-04 ASSESSMENT — PAIN DESCRIPTION - LOCATION
LOCATION: BACK;KNEE
LOCATION: BACK;KNEE

## 2021-11-04 NOTE — FLOWSHEET NOTE
Pt connected to cardiac monitor, resident at bedside, new iv established via ultrasound with labs obtained and sent for testing

## 2021-11-04 NOTE — FLOWSHEET NOTE
Resident at bedside, pt arms and body keeps intermittently twitching pt states that she has always done that along with her brother

## 2021-11-04 NOTE — ED PROVIDER NOTES
[facility] ED  Emergency Department Encounter  EmergencyMedicine Resident     Pt Name:Terri Rowan  MRN: [id number]  Sheagfmima 1965  Date of evaluation: 11/4/21  PCP:  Pepe Cardenas MD    This patient was evaluated in the Emergency Department for symptoms described in the history of present illness. The patient was evaluated in the context of the global COVID-19 pandemic, which necessitated consideration that the patient might be at risk for infection with the SARS-CoV-2 virus that causes COVID-19. Institutional protocols and algorithms that pertain to the evaluation of patients at risk for COVID-19 are in a state of rapid change based on information released by regulatory bodies including the CDC and federal and state organizations. These policies and algorithms were followed during the patient's care in the ED. CHIEF COMPLAINT       Chief Complaint   Patient presents with    Hyperglycemia       HISTORY OF PRESENT ILLNESS  (Location/Symptom, Timing/Onset, Context/Setting, Quality, Duration, Modifying Factors, Severity.)      Roseann Sumner is a 49 y.o. female who presents after a fall that occurred yesterday at noon. Patient was unable to get back up from the floor. She reports that her legs have been becoming weaker and that she uses a walker to get around. Yesterday she was not using the walker and fell to the floor. She was able to roll to her side but at no point was she able to get up. She did note that she had been calling out for help since noon yesterday and was not found until about 3 PM today. She reports head pain from falling back and hitting her head on the ground. She did not lose consciousness did not have any vomiting. She currently reports a headache. She admits to shortness of breath and is saturating at 98% on room air.   She does admit to having history of diabetes which she has had amputations of multiple fingers and toes and reports that she does not like taking her insulin and does not take her insulin. Her point-of-care glucose is 545. She reports that her PCP recommended hospice for her diabetes. PAST MEDICAL / SURGICAL / SOCIAL / FAMILY HISTORY      has a past medical history of Acid reflux, Acute cystitis with hematuria, Acute non-recurrent maxillary sinusitis, Asthma, Bipolar 1 disorder ([address] Bipolar disorder, mixed ([address] BMI 34.0-34.9,adult, Cannabis use disorder, severe, dependence ([address] Cerebrovascular accident (CVA) ([address] Chest pain, Chronic renal insufficiency, Cocaine abuse ([address] COVID-19 virus RNA test result unknown, DDD (degenerative disc disease), cervical, Diabetes mellitus ([address] Dizziness, Fibromyalgia, History of stroke, Homicidal ideation, Hyperglycemia, Hypertension, Hypotension, IDDM (insulin dependent diabetes mellitus), Lupus ([address] Migraine, Neuropathy, Neuropathy, Polysubstance abuse ([address] Post traumatic stress disorder (PTSD), Posttraumatic stress disorder, Recurrent depression ([address] Recurrent major depressive disorder, in partial remission ([address] Screening mammogram, encounter for, Severe recurrent major depression with psychotic features ([address] Severe recurrent major depression without psychotic features ([address] Stroke (cerebrum) ([address] Stroke ([address] Suicidal ideation, Suicidal intent, Vitamin D deficiency, and White matter changes. has a past surgical history that includes Abscess Drainage; chest tube insertion; Abdomen surgery; Cataract removal with implant (Bilateral); LASIK (Bilateral); Finger amputation (Left, 03/13/2021); Hand surgery (Right, 09/14/2021); Hand surgery (Right, 09/15/2021); Finger amputation (Right, 10/11/2021); and Finger amputation (Right, 10/11/2021).     Social History     Socioeconomic History    Marital status: Legally      Spouse name: Not on file    Number of children: Not on file    Years of education: Not on file    Highest education level: Not on file   Occupational History    Not on file   Tobacco Use    Smoking status: Never Smoker    Smokeless tobacco: Never Used   Vaping Use    Vaping Use: Never used   Substance and Sexual Activity    Alcohol use: Not Currently    Drug use: Yes     Types: Marijuana Amanda Jan), Cocaine     Comment: + Cocaine 2/2021 also, see Care Everywhere UDS    Sexual activity: Not Currently     Partners: Male   Other Topics Concern    Not on file   Social History Narrative    Not on file     Social Determinants of Health     Financial Resource Strain: High Risk    Difficulty of Paying Living Expenses: Hard   Food Insecurity: Food Insecurity Present    Worried About Running Out of Food in the Last Year: Often true    Kelin of Food in the Last Year: Often true   Transportation Needs: Unmet Transportation Needs    Lack of Transportation (Medical): Yes    Lack of Transportation (Non-Medical): Yes   Physical Activity: Inactive    Days of Exercise per Week: 0 days    Minutes of Exercise per Session: 0 min   Stress: Stress Concern Present    Feeling of Stress : Rather much   Social Connections:     Frequency of Communication with Friends and Family:     Frequency of Social Gatherings with Friends and Family:     Attends Jehovah's witness Services:     Active Member of Clubs or Organizations:     Attends Club or Organization Meetings:     Marital Status:    Intimate Partner Violence:     Fear of Current or Ex-Partner:     Emotionally Abused:     Physically Abused:     Sexually Abused:        Family History   Problem Relation Age of Onset    Diabetes Mother     Stroke Mother     Diabetes Father     Diabetes Sister     Diabetes Brother     Other Son         GSW    No Known Problems Sister        Allergies:  Bactrim [sulfamethoxazole-trimethoprim] and Adhesive tape    Home Medications:  Prior to Admission medications    Medication Sig Start Date End Date Taking?  Authorizing Provider   metFORMIN (GLUCOPHAGE) 1000 MG tablet TAKE 1 TABLET BY MOUTH 2 TIMES DAILY (WITH MEALS) 10/18/21   Ciarra Kellogg MD   sennosides-docusate sodium (SENOKOT-S) 8.6-50 MG tablet Take 2 tablets by mouth daily 10/13/21   Kasey Clements MD   cetirizine (ZYRTEC) 10 MG tablet Take 1 tablet by mouth daily 8/9/21   Jsoe Macedo MD   insulin glargine (LANTUS SOLOSTAR) 100 UNIT/ML injection pen Inject 20 Units into the skin 2 times daily 8/9/21   Jose Macedo MD   pregabalin (LYRICA) 75 MG capsule Take 1 capsule by mouth 2 times daily for 30 days. Patient taking differently: Take 100 mg by mouth 2 times daily.   8/9/21 9/8/21  Lolly Garcia MD   pantoprazole (PROTONIX) 20 MG tablet Take 1 tablet by mouth 2 times daily (before meals) 7/22/21   Kasey Clements MD   venlafaxine (EFFEXOR XR) 150 MG extended release capsule Take 1 capsule by mouth daily (with breakfast) 7/22/21   Kasey Clements MD   dicyclomine (BENTYL) 10 MG capsule Take 1 capsule by mouth 4 times daily 7/12/21   Delgado Johnson MD   Insulin Pen Needle ([employer] PEN NEEDLES 31G) 31G X 8 MM MISC 1 each by Does not apply route daily 1/4/21   Kasey Clements MD   traZODone (DESYREL) 150 MG tablet Take 1 tablet by mouth nightly  Patient taking differently: Take 200 mg by mouth nightly  11/18/20   MD ELIZABETH WheelerSTYLE LITE strip 1 each by Does not apply route 3 times daily 11/11/20   MD Jennifer Wheeler Lancets MISC 1 each by Does not apply route 3 times daily 11/11/20   Kasey Clements MD   albuterol sulfate  (90 Base) MCG/ACT inhaler Inhale 2 puffs into the lungs every 4 hours as needed for Wheezing 10/20/20 9/22/21  Kasey Clements MD   FLOVENT  MCG/ACT inhaler Inhale 2 puffs into the lungs 2 times daily 10/20/20   Kasey Clements MD   budesonide-formoterol (SYMBICORT) 80-4.5 MCG/ACT AERO Inhale 2 puffs into the lungs 2 times daily 10/20/20   Kasey Clements MD       REVIEW OF SYSTEMS    (2-9 systems for level 4, 10 or more for level 5)      Review of Systems Constitutional: Positive for appetite change. Negative for fever. HENT: Negative for congestion and sore throat. Respiratory: Positive for shortness of breath. Negative for cough. Cardiovascular: Positive for chest pain. Negative for leg swelling. Gastrointestinal: Positive for abdominal pain and nausea. Negative for vomiting. Genitourinary:        Incontinent of bowel and bladder while on floor   Musculoskeletal: Positive for back pain. Skin:        Surgical scar on right hand with sutures still in place. Allergic/Immunologic:        Allergic to Bactrim and adhesive   Neurological: Positive for weakness and headaches. Negative for numbness. Hematological:        No prescribed anticoagulation   Psychiatric/Behavioral: Negative for confusion. PHYSICAL EXAM   (up to 7 for level 4, 8 or more for level 5)      INITIAL VITALS:   BP (!) 130/97   Pulse 102   Temp 98.8 °F (37.1 °C) (Oral)   Resp 18   LMP  (LMP Unknown)   SpO2 98%     Physical Exam  Constitutional:       General: She is not in acute distress. HENT:      Head: Normocephalic and atraumatic. Nose: No congestion. Mouth/Throat:      Mouth: Mucous membranes are moist.   Eyes:      Conjunctiva/sclera: Conjunctivae normal.   Cardiovascular:      Rate and Rhythm: Tachycardia present. Heart sounds: No murmur heard. Pulmonary:      Effort: Pulmonary effort is normal. No respiratory distress. Breath sounds: No wheezing, rhonchi or rales. Abdominal:      General: Abdomen is flat. There is no distension. Palpations: Abdomen is soft. Tenderness: There is abdominal tenderness. There is no guarding or rebound. Musculoskeletal:      Cervical back: Neck supple. No tenderness. Right lower leg: No edema. Left lower leg: No edema. Skin:     General: Skin is warm and dry. Capillary Refill: Capillary refill takes 2 to 3 seconds. Neurological:      General: No focal deficit present.       Mental Status: She is alert. Motor: Weakness present. Comments: Kicking legs up in the air, patient reports she is very flexible.    Psychiatric:         Mood and Affect: Mood normal.         DIFFERENTIAL  DIAGNOSIS     PLAN (LABS / IMAGING / EKG):  Orders Placed This Encounter   Procedures    XR CHEST (2 VW)    CT HEAD WO CONTRAST    Basic Metabolic Panel    CBC Auto Differential    Beta-Hydroxybutyrate    Lactic Acid, Whole Blood    Urinalysis    CK    ELECTROLYTES PLUS    Hemoglobin and hematocrit, blood    CALCIUM, IONIC (POC)    Inpatient consult to IV Team    Inpatient consult to [facility]    POC Blood Gas and Chemistry    POC Glucose Fingerstick    Venous Blood Gas, POC    Creatinine W/GFR Point of Care    POCT urea (BUN)    Lactic Acid, POC    POCT Glucose    EKG 12 Lead    PATIENT STATUS (FROM ED OR OR/PROCEDURAL) Inpatient       MEDICATIONS ORDERED:  Orders Placed This Encounter   Medications    lactated ringers bolus    ondansetron (ZOFRAN) injection 4 mg       DDX: DKA, hyperglycemia, cellulitis, pneumonia, TBI, epidural hematoma, subdural hematoma    DIAGNOSTIC RESULTS / EMERGENCY DEPARTMENT COURSE / MDM   LAB RESULTS:  Results for orders placed or performed during the hospital encounter of 21/87/93   Basic Metabolic Panel   Result Value Ref Range    Glucose 540 (HH) 70 - 99 mg/dL    BUN 16 6 - 20 mg/dL    CREATININE 1.33 (H) 0.50 - 0.90 mg/dL    Bun/Cre Ratio NOT REPORTED 9 - 20    Calcium 8.9 8.6 - 10.4 mg/dL    Sodium 135 135 - 144 mmol/L    Potassium 4.1 3.7 - 5.3 mmol/L    Chloride 96 (L) 98 - 107 mmol/L    CO2 20 20 - 31 mmol/L    Anion Gap 19 (H) 9 - 17 mmol/L    GFR Non-African American 42 (L) >60 mL/min    GFR African American 50 (L) >60 mL/min    GFR Comment          GFR Staging NOT REPORTED    CBC Auto Differential   Result Value Ref Range    WBC 6.5 3.5 - 11.3 k/uL    RBC 3.35 (L) 3.95 - 5.11 m/uL    Hemoglobin 10.6 (L) 11.9 - 15.1 g/dL    Hematocrit 32.6 (L) 36.3 - 47.1 %    MCV 97.3 82.6 - 102.9 fL    MCH 31.6 25.2 - 33.5 pg    MCHC 32.5 28.4 - 34.8 g/dL    RDW 12.6 11.8 - 14.4 %    Platelets 619 284 - 316 k/uL    MPV 11.3 8.1 - 13.5 fL    NRBC Automated 0.0 0.0 per 100 WBC    Differential Type NOT REPORTED     Seg Neutrophils 58 36 - 65 %    Lymphocytes 29 24 - 43 %    Monocytes 10 3 - 12 %    Eosinophils % 2 1 - 4 %    Basophils 1 0 - 2 %    Immature Granulocytes 0 0 %    Segs Absolute 3.81 1.50 - 8.10 k/uL    Absolute Lymph # 1.88 1.10 - 3.70 k/uL    Absolute Mono # 0.66 0.10 - 1.20 k/uL    Absolute Eos # 0.13 0.00 - 0.44 k/uL    Basophils Absolute 0.04 0.00 - 0.20 k/uL    Absolute Immature Granulocyte <0.03 0.00 - 0.30 k/uL    WBC Morphology NOT REPORTED     RBC Morphology NOT REPORTED     Platelet Estimate NOT REPORTED    Beta-Hydroxybutyrate   Result Value Ref Range    Beta-Hydroxybutyrate 3.13 (H) 0.02 - 0.27 mmol/L   Lactic Acid, Whole Blood   Result Value Ref Range    Lactic Acid, Whole Blood 1.7 0.7 - 2.1 mmol/L   Urinalysis   Result Value Ref Range    Color, UA Yellow Yellow    Turbidity UA Clear Clear    Glucose, Ur 3+ (A) NEGATIVE    Bilirubin Urine NEGATIVE NEGATIVE    Ketones, Urine SMALL (A) NEGATIVE    Specific Gravity, UA 1.025 1.005 - 1.030    Urine Hgb NEGATIVE NEGATIVE    pH, UA 5.5 5.0 - 8.0    Protein, UA NEGATIVE NEGATIVE    Urobilinogen, Urine Normal Normal    Nitrite, Urine NEGATIVE NEGATIVE    Leukocyte Esterase, Urine NEGATIVE NEGATIVE    Urinalysis Comments       Microscopic exam not performed based on chemical results unless requested in original order.    CK   Result Value Ref Range    Total  (H) 26 - 192 U/L   ELECTROLYTES PLUS   Result Value Ref Range    POC Sodium 136 (L) 138 - 146 mmol/L    POC Potassium 4.2 3.5 - 4.5 mmol/L    POC Chloride 100 98 - 107 mmol/L    POC TCO2 21 (L) 22 - 30 mmol/L    Anion Gap 16 7 - 16 mmol/L   Hemoglobin and hematocrit, blood   Result Value Ref Range    POC Hemoglobin 9.8 (L) 12.0 - 16.0 g/dL POC Hematocrit 29 (L) 36 - 46 %   CALCIUM, IONIC (POC)   Result Value Ref Range    POC Ionized Calcium 1.19 1.15 - 1.33 mmol/L   POC Glucose Fingerstick   Result Value Ref Range    POC Glucose 545 (HH) 65 - 105 mg/dL   Venous Blood Gas, POC   Result Value Ref Range    pH, Alex 7.328 7.320 - 7.430    pCO2, Alex 37.6 (L) 41.0 - 51.0 mm Hg    pO2, Alex 15.0 (L) 30 - 50 mm Hg    HCO3, Venous 19.8 (L) 22.0 - 29.0 mmol/L    Total CO2, Venous NOT REPORTED 23.0 - 30.0 mmol/L    Negative Base Excess, Alex 6 (H) 0.0 - 2.0    Positive Base Excess, Alex NOT REPORTED 0.0 - 3.0    O2 Sat, Alex 17 (L) 60.0 - 85.0 %    O2 Device/Flow/% NOT REPORTED     Danish Test NOT REPORTED     Sample Site NOT REPORTED     Mode NOT REPORTED     FIO2 NOT REPORTED     Pt Temp NOT REPORTED     POC pH Temp NOT REPORTED     POC pCO2 Temp NOT REPORTED mm Hg    POC pO2 Temp NOT REPORTED mm Hg   Creatinine W/GFR Point of Care   Result Value Ref Range    POC Creatinine 0.79 0.51 - 1.19 mg/dL    GFR Comment >60 >60 mL/min    GFR Non-African American >60 >60 mL/min    GFR Comment         POCT urea (BUN)   Result Value Ref Range    POC BUN 14 8 - 26 mg/dL   Lactic Acid, POC   Result Value Ref Range    POC Lactic Acid 7.04 (H) 0.56 - 1.39 mmol/L   POCT Glucose   Result Value Ref Range    POC Glucose 416 (HH) 74 - 100 mg/dL       IMPRESSION: Acidosis, ketonemia, hyperglycemia likely diabetic ketoacidosis    RADIOLOGY:  XR CHEST (2 VW)    Result Date: 11/4/2021  EXAMINATION: TWO XRAY VIEWS OF THE CHEST 11/4/2021 3:25 pm COMPARISON: 09/15/2021 HISTORY: ORDERING SYSTEM PROVIDED HISTORY: shortness of breath TECHNOLOGIST PROVIDED HISTORY: shortness of breath FINDINGS: The lungs are without acute focal process. There is no effusion or pneumothorax. The cardiomediastinal silhouette is stable. The osseous structures are stable. No acute process.      CT HEAD WO CONTRAST    Result Date: 11/4/2021  EXAMINATION: CT OF THE HEAD WITHOUT CONTRAST  11/4/2021 3:47 pm TECHNIQUE: CT of the head was performed without the administration of intravenous contrast. Dose modulation, iterative reconstruction, and/or weight based adjustment of the mA/kV was utilized to reduce the radiation dose to as low as reasonably achievable. COMPARISON: 09/15/2021 HISTORY: ORDERING SYSTEM PROVIDED HISTORY: fall TECHNOLOGIST PROVIDED HISTORY: fall Decision Support Exception - unselect if not a suspected or confirmed emergency medical condition->Emergency Medical Condition (MA) Is the patient pregnant?->No Reason for Exam: fall Acuity: Acute Type of Exam: Initial FINDINGS: BRAIN/VENTRICLES: The ventricles and sulci are diffusely enlarged. Low attenuation is seen in the periventricular and subcortical white matter. No acute intracranial hemorrhage or acute infarct is identified. ORBITS: The visualized portion of the orbits demonstrate no acute abnormality. SINUSES: The visualized paranasal sinuses and mastoid air cells demonstrate no acute abnormality. SOFT TISSUES/SKULL:  No acute abnormality of the visualized skull or soft tissues. No acute intracranial abnormality. Diffuse atrophic changes with periventricular white matter low attenuation suggesting chronic small vessel ischemia or other demyelinating processes     EMERGENCY DEPARTMENT COURSE:  ED Course as of Nov 04 1754   Thu Nov 04, 2021   1521 Blood sugar 545    [ML]   1634 No acute processes on CT head and CXR    [ML]   1657 Hgb at baseline    [ML]   1657 Received call from Donnie Gonzalez, nurse care coordinator from [facility] who informed me that she could see that the patient was in the emergency department and that I was her physician. Donald Cho noted that I had not ordered a urine drug screen on the patient and is requesting it as she reports patient is \"manipulative\" and \"got her check on the first of the month. \" This writer ended phone call as it was inappropriate and [facility] had not been contacted regarding this patient's care.     [ML] 1709 Anion Gap(!): 19 [ML]   1709 Beta-Hydroxybutyrate(!): 3.13 [ML]   1728 Consulting MFP at this time for admission. [ML]   0105 Discussed with Dr. Aurora Vasquez, will admit. [ML]      ED Course User Index  [ML] Alli Weaver DO     CONSULTS:  IP CONSULT TO IV TEAM  IP CONSULT TO FAMILY MEDICINE  IP CONSULT TO SOCIAL WORK    FINAL IMPRESSION      1. Diabetic ketoacidosis without coma associated with type 2 diabetes mellitus ([facility] [address]          DISPOSITION / PLAN     DISPOSITION Admitted 11/04/2021 05:52:26 PM      PATIENT REFERRED TO:  No follow-up provider specified.     DISCHARGE MEDICATIONS:  New Prescriptions    No medications on file       Bhavna Hayes DO  Emergency Medicine Resident    (Please note that portions of thisnote were completed with a voice recognition program.  Efforts were made to edit the dictations but occasionally words are mis-transcribed.)       Alli Weaver DO  Resident  11/04/21 [id number]

## 2021-11-04 NOTE — FLOWSHEET NOTE
Pt returns from xray, tech at bedside to attempt ultrasound IV to get labs and better access, resident at bedside.

## 2021-11-04 NOTE — ED NOTES
Pt yelling and screaming at staff, requesting pain medication admitting rounded prior to and is placing order currently to notified.      Teddy Pack RN  11/04/21 [id number]

## 2021-11-04 NOTE — ED PROVIDER NOTES
or abrasions or signs to suggest overt trauma on exam.  Obtain broad laboratory work-up fluids, CT imaging of the head, EKG, chest x-ray, anticipated admission      Critical Care  None          Monique Morrison MD    Attending Emergency Medicine Physician              Rome Brown MD  11/04/21 [id number]

## 2021-11-04 NOTE — FLOWSHEET NOTE
Unable to draw labs from IV start, resident and attending notified pt tearful and continues to have jerking movements with extremities

## 2021-11-04 NOTE — ED TRIAGE NOTES
In by EMS for report of hyperglycemia per ems pt reports that yesterday around 12 pm pt legs gave out causing her to fall on the floor, pt has been on the floor since 11/3/2021 around 12pm and was yelling for help and finally someone came this afternoon, pt was incontinent of bowel and bladder.

## 2021-11-04 NOTE — H&P
Department of [address] Date: 11/4/2021  Admitting Diagnosis:- DKA, type 2, not at goal [facility])  Attending Physician: Robson Francisco MD    Chief Complaint:     Fall, Hyperglycemia    HPI:   Meredith Mcneill is a 49 y. o.female with history of DM type II, HTN, CVA, polysubstance abuse, and bipolar disorder. Today the patient presented to the ED by EMS on 11/04. Patient was found on the ground by a  at her home who called EMS. The patient reports falling yesterday afternoon (11/03) and has not been able to get up since. Patient did not lose consciousness. During this entire time the patient was unable to obtain her insulin or eat. In the ED, the patient's point-of-care glucose was found to be elevated at 545. BMP revealed a bicarb of 20, and anion gap of 19, and a creatinine of 1.33. Beta hydroxybutyrate was elevated at 3.13. Urinalysis was positive for ketones and glucosuria. Patient's total CK was also elevated at 245. Myoglobin pending. Imaging done in the ER included CT head without contrast and chest x-ray. No acute intracranial abnormality seen on CT head. The patient received 1 L bolus of lactated Ringer, along with Zofran 4 mg IV. Patient was admitted for management of DKA, MARTIN, rhabdomyolysis.     Past Medical History     Past Medical History:   Diagnosis Date    Acid reflux 5/29/2017    Acute cystitis with hematuria 10/11/2016    Acute non-recurrent maxillary sinusitis 11/28/2017    Asthma     Bipolar 1 disorder ([address] 1/4/2018    Bipolar disorder, mixed ([address]     BMI 34.0-34.9,adult 11/28/2017    Cannabis use disorder, severe, dependence ([address] 12/19/2017    Cerebrovascular accident (CVA) ([address] 6/14/2017    Chest pain 11/5/2016    Chronic renal insufficiency     Cocaine abuse ([address] 1/5/2018    COVID-19 virus RNA test result unknown     DDD (degenerative disc disease), cervical     Diabetes mellitus ([address] Dizziness 6/13/2017    Fibromyalgia     History of stroke 9/9/2017    Homicidal ideation 11/6/2017    Hyperglycemia     Hypertension     Hypotension 1/18/2019    IDDM (insulin dependent diabetes mellitus) 12/21/2015    Lupus ([address]     Migraine     Neuropathy     Neuropathy     Polysubstance abuse ([address] 9/17/2017    Post traumatic stress disorder (PTSD)     Posttraumatic stress disorder 5/29/2017    Recurrent depression ([address] 5/29/2017    Recurrent major depressive disorder, in partial remission ([address] 11/28/2017    Screening mammogram, encounter for 11/28/2017    Severe recurrent major depression with psychotic features ([address] 12/19/2017    Severe recurrent major depression without psychotic features ([address] 12/19/2017    Stroke (cerebrum) ([address] 6/14/2017    Stroke ([address]     per chart notes    Suicidal ideation     Suicidal intent 3/10/2017    Vitamin D deficiency 11/28/2017    White matter changes 6/13/2017       Past Surgical History     Past Surgical History:   Procedure Laterality Date    ABDOMEN SURGERY      drain tube    ABSCESS DRAINAGE      right buttock    CATARACT REMOVAL WITH IMPLANT Bilateral     CHEST TUBE INSERTION      FINGER AMPUTATION Left 03/13/2021    LEFT RING FINER AMPUTATION performed by Piero Queen MD at [facility] Right 10/11/2021    AMPUTATION RIGHT INDEX FINGER    FINGER AMPUTATION Right 10/11/2021    AMPUTATION RIGHT INDEX FINGER performed by Piero Queen MD at [facility] Right 09/14/2021    I&D INDEX FINGER performed by Piero Queen MD at [address] [place] [address] Right 09/15/2021    I&D INDEX FINGER performed by Piero Queen MD at [facility] Bilateral        Social History     Social History     Tobacco Use    Smoking status: Never Smoker    Smokeless tobacco: Never Used   Vaping Use    Vaping Use: Never used   Substance Use Topics    Alcohol use: Not Currently    Drug use: Yes     Types: Marijuana Simmie Lang), Cocaine     Comment: + Cocaine 2/2021 also, see Care Everywhere UDS       Family History     Family History   Problem Relation Age of Onset    Diabetes Mother     Stroke Mother     Diabetes Father     Diabetes Sister     Diabetes Brother     Other Son         GSW    No Known Problems Sister        Allergies     Allergies   Allergen Reactions    Bactrim [Sulfamethoxazole-Trimethoprim] Swelling    Adhesive Tape Rash       Home Medications     Prior to Admission medications    Medication Sig Start Date End Date Taking? Authorizing Provider   metFORMIN (GLUCOPHAGE) 1000 MG tablet TAKE 1 TABLET BY MOUTH 2 TIMES DAILY (WITH MEALS) 10/18/21   Ellen Mabry MD   sennosides-docusate sodium (SENOKOT-S) 8.6-50 MG tablet Take 2 tablets by mouth daily 10/13/21   Carola Joya MD   cetirizine (ZYRTEC) 10 MG tablet Take 1 tablet by mouth daily 8/9/21   Jose Macedo MD   insulin glargine (LANTUS SOLOSTAR) 100 UNIT/ML injection pen Inject 20 Units into the skin 2 times daily 8/9/21   Jose Macedo MD   pregabalin (LYRICA) 75 MG capsule Take 1 capsule by mouth 2 times daily for 30 days. Patient taking differently: Take 100 mg by mouth 2 times daily.   8/9/21 9/8/21  Song Rogers MD   pantoprazole (PROTONIX) 20 MG tablet Take 1 tablet by mouth 2 times daily (before meals) 7/22/21   Carola Joya MD   venlafaxine (EFFEXOR XR) 150 MG extended release capsule Take 1 capsule by mouth daily (with breakfast) 7/22/21   Carola Joya MD   dicyclomine (BENTYL) 10 MG capsule Take 1 capsule by mouth 4 times daily 7/12/21   Noel Foote MD   Insulin Pen Needle ([employer] PEN NEEDLES 31G) 31G X 8 MM MISC 1 each by Does not apply route daily 1/4/21   Carola Joya MD   traZODone (DESYREL) 150 MG tablet Take 1 tablet by mouth nightly  Patient taking differently: Take 200 mg by mouth nightly  11/18/20   MD ELIZABETH MoralesSTYLE LITE strip 1 each by Does not apply route 3 times daily 11/11/20   Carola Joya MD   FreeStyle Lancets MISC 1 each by Does not apply route 3 times daily 11/11/20   Juanpablo Hameed MD   albuterol sulfate  (90 Base) MCG/ACT inhaler Inhale 2 puffs into the lungs every 4 hours as needed for Wheezing 10/20/20 9/22/21  Juanpablo Hameed MD   FLOVENT  MCG/ACT inhaler Inhale 2 puffs into the lungs 2 times daily 10/20/20   Juanpablo Hameed MD   budesonide-formoterol (SYMBICORT) 80-4.5 MCG/ACT AERO Inhale 2 puffs into the lungs 2 times daily 10/20/20   Juanpablo Hameed MD       Review of Systems:     Review of Systems   Constitutional: Positive for appetite change. Negative for fever. HENT: Negative for congestion and sore throat. Respiratory: Positive for shortness of breath. Cardiovascular: Negative for chest pain and leg swelling. Gastrointestinal: Positive for abdominal pain and nausea. Negative for constipation, diarrhea and vomiting. Genitourinary: Negative for dysuria. Musculoskeletal: Positive for arthralgias (Bilateral knee). Neurological: Positive for weakness and headaches. Negative for syncope. Psychiatric/Behavioral: Positive for agitation. Negative for suicidal ideas. Physical Exam:     Vitals:    11/04/21 1504 11/04/21 1526 11/04/21 1800   BP: (!) 130/97     Pulse: 102     Resp: 18     Temp: 98.8 °F (37.1 °C)     TempSrc: Oral     SpO2: 98% 98%    Weight:   200 lb (90.7 kg)     Physical Exam  Vitals and nursing note reviewed. Constitutional:       General: She is in acute distress. Appearance: She is not toxic-appearing. Eyes:      General:         Right eye: No discharge. Left eye: No discharge. Cardiovascular:      Rate and Rhythm: Regular rhythm. Tachycardia present. Heart sounds: Normal heart sounds. No murmur heard. Pulmonary:      Effort: No respiratory distress. Breath sounds: No wheezing or rhonchi. Abdominal:      General: There is no distension. Palpations: Abdomen is soft. Tenderness: There is abdominal tenderness (Generalized). There is no guarding or rebound. Musculoskeletal:         General: No swelling or tenderness. Right lower leg: No edema. Left lower leg: No edema. Comments: Amputated right index finger and left foot 5th digit   Neurological:      General: No focal deficit present. Mental Status: She is alert and oriented to person, place, and time. Comments: Appearing restless. Constantly moving lower and upper extremities. Psychiatric:         Behavior: Behavior is agitated. Behavior is not aggressive or combative. Diagnostic Labs:     CBC:   Recent Labs     11/04/21  1640   WBC 6.5   HGB 10.6*        BMP:    Recent Labs     11/04/21  1640 11/04/21  1723     --    K 4.1  --    CL 96*  --    CO2 20  --    BUN 16  --    CREATININE 1.33* 0.79   GLUCOSE 540*  --      Hepatic: No results for input(s): AST, ALT, ALB, BILITOT, ALKPHOS in the last 72 hours. Troponin: No results for input(s): TROPONINI in the last 72 hours. BNP: No results for input(s): BNP in the last 72 hours. Lipids: No results for input(s): CHOL, HDL in the last 72 hours. Invalid input(s): LDLCALCU  INR: No results for input(s): INR in the last 72 hours. ABG: Invalid input(s): ABG    Imaging:     XR CHEST (2 VW)    Result Date: 11/4/2021  No acute process. CT HEAD WO CONTRAST    Result Date: 11/4/2021  No acute intracranial abnormality. Diffuse atrophic changes with periventricular white matter low attenuation suggesting chronic small vessel ischemia or other demyelinating processes    Assessment:   Principal Problem:    DKA, type 2, not at goal [facility])  Active Problems:    Bipolar disorder, mixed [facility]    Polysubstance abuse ([facility]    Bipolar 1 disorder ([facility]    Asthma exacerbation attacks    Acute kidney injury ([facility]  Resolved Problems:    * No resolved hospital problems.  *    Plan:     Admit patient to step-down    DKA, 2/2 poor insulin compliance  - HbA1c in Sept 2021 was 14.2.  -NPO effective immediately  -DKA protocol started  -BMP q4h, initial Mag and Phos levels  -Glucose checks every hour  -Urinalysis reviewed and urine ketones: Small  -Betahydroxybutarate levels: 3.13  -IV 0.45% Normal Saline at 250 mL/hr  -Insulin Regular started at 0.1 Units/kg/hr  -D5 and 0.45% NS at 150 mL/hr when blood glucose less than 250 mg/dL  -Will monitor bicarb and anion gap, bridge with home lantus dose and begin diet PO when bicarb >15 and gap closed x 2 measurements  -Discontinue Insulin drip 2 hours post PO intake. -Hypoglycemia, phos and potassium replacement protocols in place    MARTIN  Elevated Cr at 1.33  Continue IV 0.45% NS at 250 mL/h  Repeat BMP tomorrow    Fall r/o Rhabdomyolysis  Negative head CT  Elevated CK at 245. Myoglobin pending  Continue IV 0.45% NS at 250 mL/h    Asthma  Continue albuterol  Continue symbicort    Bipolar Disorder  Continue Effexor 150 mg daily  Psych consulted    Polysubstance Abuse  UDS positive for cocaine    DVT Prophylaxis: Lovenox 40 mg subcu daily  GI Prophylaxis: Continue home dose protonix 20 mg BID  Diet: NPO  Dispo: Step-down    OT/PT/Social work consults in place. Above plan discussed with the patient and nursing staff. This plan will be discussed with the rounding attending: Siobhan Maldonado MD.      Dunia Atwood MD   Resident Physician  Family Medicine Inpatient Service  11/4/2021 7:05 PM      ATTENDING NOTE    I have reviewed and discussed key elements of [address] [place] [address] with the resident including plan of care and follow up and agree with the care lizandro plan.

## 2021-11-05 PROBLEM — F32.9 MAJOR DEPRESSION: Status: ACTIVE | Noted: 2021-11-05

## 2021-11-05 LAB
ANION GAP SERPL CALCULATED.3IONS-SCNC: 11 MMOL/L (ref 9–17)
ANION GAP SERPL CALCULATED.3IONS-SCNC: 14 MMOL/L (ref 9–17)
ANION GAP SERPL CALCULATED.3IONS-SCNC: 14 MMOL/L (ref 9–17)
ANION GAP SERPL CALCULATED.3IONS-SCNC: 15 MMOL/L (ref 9–17)
ANION GAP SERPL CALCULATED.3IONS-SCNC: 20 MMOL/L (ref 9–17)
BUN BLDV-MCNC: 14 MG/DL (ref 6–20)
BUN BLDV-MCNC: 14 MG/DL (ref 6–20)
BUN BLDV-MCNC: 15 MG/DL (ref 6–20)
BUN BLDV-MCNC: 16 MG/DL (ref 6–20)
BUN BLDV-MCNC: 16 MG/DL (ref 6–20)
BUN/CREAT BLD: ABNORMAL (ref 9–20)
CALCIUM SERPL-MCNC: 8.3 MG/DL (ref 8.6–10.4)
CALCIUM SERPL-MCNC: 8.7 MG/DL (ref 8.6–10.4)
CALCIUM SERPL-MCNC: 8.8 MG/DL (ref 8.6–10.4)
CHLORIDE BLD-SCNC: 101 MMOL/L (ref 98–107)
CHLORIDE BLD-SCNC: 102 MMOL/L (ref 98–107)
CHLORIDE BLD-SCNC: 102 MMOL/L (ref 98–107)
CHLORIDE BLD-SCNC: 103 MMOL/L (ref 98–107)
CHLORIDE BLD-SCNC: 104 MMOL/L (ref 98–107)
CO2: 18 MMOL/L (ref 20–31)
CO2: 20 MMOL/L (ref 20–31)
CO2: 20 MMOL/L (ref 20–31)
CREAT SERPL-MCNC: 0.98 MG/DL (ref 0.5–0.9)
CREAT SERPL-MCNC: 1.1 MG/DL (ref 0.5–0.9)
CREAT SERPL-MCNC: 1.12 MG/DL (ref 0.5–0.9)
CREAT SERPL-MCNC: 1.37 MG/DL (ref 0.5–0.9)
CREAT SERPL-MCNC: 1.49 MG/DL (ref 0.5–0.9)
EKG ATRIAL RATE: 90 BPM
EKG P AXIS: 66 DEGREES
EKG P-R INTERVAL: 138 MS
EKG Q-T INTERVAL: 386 MS
EKG QRS DURATION: 94 MS
EKG QTC CALCULATION (BAZETT): 472 MS
EKG R AXIS: 10 DEGREES
EKG T AXIS: 57 DEGREES
EKG VENTRICULAR RATE: 90 BPM
ESTIMATED AVERAGE GLUCOSE: 295 MG/DL
GFR AFRICAN AMERICAN: 44 ML/MIN
GFR AFRICAN AMERICAN: 49 ML/MIN
GFR AFRICAN AMERICAN: >60 ML/MIN
GFR NON-AFRICAN AMERICAN: 36 ML/MIN
GFR NON-AFRICAN AMERICAN: 40 ML/MIN
GFR NON-AFRICAN AMERICAN: 51 ML/MIN
GFR NON-AFRICAN AMERICAN: 52 ML/MIN
GFR NON-AFRICAN AMERICAN: 59 ML/MIN
GFR SERPL CREATININE-BSD FRML MDRD: ABNORMAL ML/MIN/{1.73_M2}
GLUCOSE BLD-MCNC: 106 MG/DL (ref 65–105)
GLUCOSE BLD-MCNC: 113 MG/DL (ref 65–105)
GLUCOSE BLD-MCNC: 113 MG/DL (ref 65–105)
GLUCOSE BLD-MCNC: 142 MG/DL (ref 65–105)
GLUCOSE BLD-MCNC: 152 MG/DL (ref 65–105)
GLUCOSE BLD-MCNC: 173 MG/DL (ref 65–105)
GLUCOSE BLD-MCNC: 174 MG/DL (ref 65–105)
GLUCOSE BLD-MCNC: 174 MG/DL (ref 65–105)
GLUCOSE BLD-MCNC: 176 MG/DL (ref 65–105)
GLUCOSE BLD-MCNC: 179 MG/DL (ref 65–105)
GLUCOSE BLD-MCNC: 191 MG/DL (ref 70–99)
GLUCOSE BLD-MCNC: 196 MG/DL (ref 70–99)
GLUCOSE BLD-MCNC: 200 MG/DL (ref 65–105)
GLUCOSE BLD-MCNC: 225 MG/DL (ref 65–105)
GLUCOSE BLD-MCNC: 275 MG/DL (ref 65–105)
GLUCOSE BLD-MCNC: 298 MG/DL (ref 65–105)
GLUCOSE BLD-MCNC: 362 MG/DL (ref 65–105)
GLUCOSE BLD-MCNC: 365 MG/DL (ref 70–99)
GLUCOSE BLD-MCNC: 365 MG/DL (ref 70–99)
GLUCOSE BLD-MCNC: 84 MG/DL (ref 70–99)
GLUCOSE BLD-MCNC: 94 MG/DL (ref 65–105)
HBA1C MFR BLD: 11.9 % (ref 4–6)
PHOSPHORUS: 4.1 MG/DL (ref 2.6–4.5)
PHOSPHORUS: 4.1 MG/DL (ref 2.6–4.5)
PHOSPHORUS: 4.4 MG/DL (ref 2.6–4.5)
PHOSPHORUS: 4.9 MG/DL (ref 2.6–4.5)
PHOSPHORUS: 5.3 MG/DL (ref 2.6–4.5)
POTASSIUM SERPL-SCNC: 3.6 MMOL/L (ref 3.7–5.3)
POTASSIUM SERPL-SCNC: 3.8 MMOL/L (ref 3.7–5.3)
POTASSIUM SERPL-SCNC: 3.9 MMOL/L (ref 3.7–5.3)
POTASSIUM SERPL-SCNC: 3.9 MMOL/L (ref 3.7–5.3)
POTASSIUM SERPL-SCNC: 4.8 MMOL/L (ref 3.7–5.3)
POTASSIUM SERPL-SCNC: 4.8 MMOL/L (ref 3.7–5.3)
SARS-COV-2, RAPID: NOT DETECTED
SODIUM BLD-SCNC: 134 MMOL/L (ref 135–144)
SODIUM BLD-SCNC: 135 MMOL/L (ref 135–144)
SODIUM BLD-SCNC: 135 MMOL/L (ref 135–144)
SODIUM BLD-SCNC: 137 MMOL/L (ref 135–144)
SODIUM BLD-SCNC: 139 MMOL/L (ref 135–144)
SPECIMEN DESCRIPTION: NORMAL

## 2021-11-05 PROCEDURE — 87635 SARS-COV-2 COVID-19 AMP PRB: CPT

## 2021-11-05 PROCEDURE — 6370000000 HC RX 637 (ALT 250 FOR IP)

## 2021-11-05 PROCEDURE — 80048 BASIC METABOLIC PNL TOTAL CA: CPT

## 2021-11-05 PROCEDURE — 6360000002 HC RX W HCPCS

## 2021-11-05 PROCEDURE — 6370000000 HC RX 637 (ALT 250 FOR IP): Performed by: STUDENT IN AN ORGANIZED HEALTH CARE EDUCATION/TRAINING PROGRAM

## 2021-11-05 PROCEDURE — 99223 1ST HOSP IP/OBS HIGH 75: CPT | Performed by: FAMILY MEDICINE

## 2021-11-05 PROCEDURE — 36415 COLL VENOUS BLD VENIPUNCTURE: CPT

## 2021-11-05 PROCEDURE — 2060000000 HC ICU INTERMEDIATE R&B

## 2021-11-05 PROCEDURE — 82947 ASSAY GLUCOSE BLOOD QUANT: CPT

## 2021-11-05 PROCEDURE — 6360000002 HC RX W HCPCS: Performed by: STUDENT IN AN ORGANIZED HEALTH CARE EDUCATION/TRAINING PROGRAM

## 2021-11-05 PROCEDURE — 84132 ASSAY OF SERUM POTASSIUM: CPT

## 2021-11-05 PROCEDURE — 2580000003 HC RX 258

## 2021-11-05 PROCEDURE — 90792 PSYCH DIAG EVAL W/MED SRVCS: CPT | Performed by: PSYCHIATRY & NEUROLOGY

## 2021-11-05 PROCEDURE — 94640 AIRWAY INHALATION TREATMENT: CPT

## 2021-11-05 PROCEDURE — 94761 N-INVAS EAR/PLS OXIMETRY MLT: CPT

## 2021-11-05 PROCEDURE — 2580000003 HC RX 258: Performed by: STUDENT IN AN ORGANIZED HEALTH CARE EDUCATION/TRAINING PROGRAM

## 2021-11-05 PROCEDURE — 84100 ASSAY OF PHOSPHORUS: CPT

## 2021-11-05 RX ORDER — POTASSIUM CHLORIDE 20 MEQ/1
40 TABLET, EXTENDED RELEASE ORAL PRN
Status: DISCONTINUED | OUTPATIENT
Start: 2021-11-05 | End: 2021-11-08 | Stop reason: HOSPADM

## 2021-11-05 RX ORDER — INSULIN GLARGINE 100 [IU]/ML
20 INJECTION, SOLUTION SUBCUTANEOUS 2 TIMES DAILY
Status: DISCONTINUED | OUTPATIENT
Start: 2021-11-05 | End: 2021-11-05

## 2021-11-05 RX ORDER — CHOLECALCIFEROL (VITAMIN D3) 125 MCG
5 CAPSULE ORAL NIGHTLY PRN
Status: DISCONTINUED | OUTPATIENT
Start: 2021-11-05 | End: 2021-11-05

## 2021-11-05 RX ORDER — INSULIN GLARGINE 100 [IU]/ML
25 INJECTION, SOLUTION SUBCUTANEOUS 2 TIMES DAILY
Status: DISCONTINUED | OUTPATIENT
Start: 2021-11-06 | End: 2021-11-06

## 2021-11-05 RX ORDER — POTASSIUM CHLORIDE 7.45 MG/ML
10 INJECTION INTRAVENOUS PRN
Status: CANCELLED | OUTPATIENT
Start: 2021-11-05

## 2021-11-05 RX ORDER — POTASSIUM BICARBONATE 25 MEQ/1
50 TABLET, EFFERVESCENT ORAL PRN
Status: DISCONTINUED | OUTPATIENT
Start: 2021-11-05 | End: 2021-11-08 | Stop reason: HOSPADM

## 2021-11-05 RX ORDER — POTASSIUM CHLORIDE 7.45 MG/ML
10 INJECTION INTRAVENOUS PRN
Status: DISCONTINUED | OUTPATIENT
Start: 2021-11-05 | End: 2021-11-05

## 2021-11-05 RX ORDER — DIPHENHYDRAMINE HCL 25 MG
50 TABLET ORAL ONCE
Status: COMPLETED | OUTPATIENT
Start: 2021-11-05 | End: 2021-11-05

## 2021-11-05 RX ORDER — MIRTAZAPINE 15 MG/1
7.5 TABLET, FILM COATED ORAL NIGHTLY
Status: DISCONTINUED | OUTPATIENT
Start: 2021-11-05 | End: 2021-11-08 | Stop reason: HOSPADM

## 2021-11-05 RX ORDER — POTASSIUM CHLORIDE 20 MEQ/1
20 TABLET, EXTENDED RELEASE ORAL ONCE
Status: COMPLETED | OUTPATIENT
Start: 2021-11-05 | End: 2021-11-05

## 2021-11-05 RX ORDER — POTASSIUM CHLORIDE 20 MEQ/1
40 TABLET, EXTENDED RELEASE ORAL ONCE
Status: COMPLETED | OUTPATIENT
Start: 2021-11-05 | End: 2021-11-05

## 2021-11-05 RX ADMIN — VENLAFAXINE HYDROCHLORIDE 150 MG: 150 CAPSULE, EXTENDED RELEASE ORAL at 08:39

## 2021-11-05 RX ADMIN — MORPHINE SULFATE 2 MG: 4 INJECTION, SOLUTION INTRAMUSCULAR; INTRAVENOUS at 08:39

## 2021-11-05 RX ADMIN — DICYCLOMINE HYDROCHLORIDE 10 MG: 10 CAPSULE ORAL at 08:39

## 2021-11-05 RX ADMIN — PREGABALIN 75 MG: 75 CAPSULE ORAL at 20:08

## 2021-11-05 RX ADMIN — PREGABALIN 75 MG: 75 CAPSULE ORAL at 02:52

## 2021-11-05 RX ADMIN — MORPHINE SULFATE 2 MG: 4 INJECTION, SOLUTION INTRAMUSCULAR; INTRAVENOUS at 13:05

## 2021-11-05 RX ADMIN — PREGABALIN 75 MG: 75 CAPSULE ORAL at 08:39

## 2021-11-05 RX ADMIN — DICYCLOMINE HYDROCHLORIDE 10 MG: 10 CAPSULE ORAL at 16:42

## 2021-11-05 RX ADMIN — DICYCLOMINE HYDROCHLORIDE 10 MG: 10 CAPSULE ORAL at 20:08

## 2021-11-05 RX ADMIN — MIRTAZAPINE 7.5 MG: 15 TABLET, FILM COATED ORAL at 20:28

## 2021-11-05 RX ADMIN — BUDESONIDE AND FORMOTEROL FUMARATE DIHYDRATE 2 PUFF: 80; 4.5 AEROSOL RESPIRATORY (INHALATION) at 10:07

## 2021-11-05 RX ADMIN — MORPHINE SULFATE 2 MG: 4 INJECTION, SOLUTION INTRAMUSCULAR; INTRAVENOUS at 04:52

## 2021-11-05 RX ADMIN — PANTOPRAZOLE SODIUM 20 MG: 20 TABLET, DELAYED RELEASE ORAL at 16:42

## 2021-11-05 RX ADMIN — MORPHINE SULFATE 2 MG: 4 INJECTION, SOLUTION INTRAMUSCULAR; INTRAVENOUS at 20:08

## 2021-11-05 RX ADMIN — MORPHINE SULFATE 2 MG: 4 INJECTION, SOLUTION INTRAMUSCULAR; INTRAVENOUS at 00:07

## 2021-11-05 RX ADMIN — Medication 5 MG: at 20:07

## 2021-11-05 RX ADMIN — PANTOPRAZOLE SODIUM 20 MG: 20 TABLET, DELAYED RELEASE ORAL at 08:39

## 2021-11-05 ASSESSMENT — PAIN SCALES - GENERAL
PAINLEVEL_OUTOF10: 7
PAINLEVEL_OUTOF10: 10
PAINLEVEL_OUTOF10: 10
PAINLEVEL_OUTOF10: 7
PAINLEVEL_OUTOF10: 4
PAINLEVEL_OUTOF10: 7

## 2021-11-05 ASSESSMENT — ENCOUNTER SYMPTOMS
RHINORRHEA: 0
VOMITING: 0
COUGH: 0
ABDOMINAL PAIN: 0
SHORTNESS OF BREATH: 0
NAUSEA: 1

## 2021-11-05 NOTE — FLOWSHEET NOTE
Assessment:  Patient is a 49year old female in ED #4.  has history with patient. Patient requested  visit her. Intervention:   was ministry of presence.  listened and validated patient's feelings and emotions.  prayed with patient. Outcome:  Patient expressed gratitude for visit. Plan:  Chaplains may be paged 24/7 via [address] [place] [address]. 11/04/21 8369   Encounter Summary   Services provided to: Patient   Referral/Consult From: Nurse   Support System Children   Continue Visiting   (11/4/2021)   Complexity of Encounter Moderate   Length of Encounter 30 minutes   Spiritual Assessment Completed Yes   Routine   Type Initial   Assessment Passive; Hopeful   Intervention Active listening;Prayer;Sustaining presence/ Ministry of presence   Outcome Expressed gratitude

## 2021-11-05 NOTE — CARE COORDINATION
CM received a report of an abnormal report in mail box. CM found after opening the notice it was an elevated blood sugar on Terri (545). CM opened the chart to find Andrey Burton was in the ED. CM placed a call to the ED department. GORDON was asked to speak with the attending physician by the triage nurse. CM identified herself to the physician. CM shared with the attending that Andrey Burton had a drug history. CM shared Andrey Burton does well and then falls of track. CM asked if the physician would consider a drug screen while in the department.   Plan:  F/U on resolved lab work tomorrow AM.

## 2021-11-05 NOTE — FLOWSHEET NOTE
SPIRITUAL CARE PROGRESS NOTE  SUMMARY:Patient has suffered many personal losses.  Spiritual Assessment: Patient was welcoming and receptive to a visit from [facility]. Patient engaged in conversation and shared struggles surrounding her current hospitalization. Patient expressed feeling in pain. .   Patient's Lorrin Flaming henry is a source of support and comfort during this challenging time. Patient denies experiencing any spiritual distress at this time. Patient is well connected with friends and family who are communicating with the patient regularly.  Intervention: I provided compassionate/active listening and validated patient's feelings, concerns, and experiences.  provided space for the patient to share important and significant life events.  inquired about patient's support network.  facilitated discussion related to henry issues and offered a prayer. Patient welcomed a prayer and we prayed for her health and peace.  Outcome: Patient expressed gratitude for my visit. Patient cathartically expressed emotions and concerns. Patient is aware that [facility] is available 24\7 if she would like to receive another visit from a  or if there are any other spiritual services that our department could provide.        11/04/21 7492   Encounter Summary   Services provided to: Patient   Referral/Consult From: Nurse   Support System Family members   Continue Visiting   (11/4/21)   Complexity of Encounter Moderate   Length of Encounter 30 minutes   Spiritual Assessment Completed Yes   Routine   Type Initial   Assessment Tearful;Coping   Intervention Active listening;Explored feelings, thoughts, concerns;Prayer;Discussed relationship with God   Outcome Expressed gratitude       Electronically signed by Chaplain Resident Gabriela Webber MDiv.on 11/4/2021 at 10:43 PM   [facility]  [phone]

## 2021-11-05 NOTE — CARE COORDINATION
11/05/21 1219   Readmission Assessment   Number of Days since last admission? 8-30 days   Previous Disposition Home Alone   Who is being Interviewed Patient   What was the patient's/caregiver's perception as to why they think they needed to return back to the hospital? Other (Comment)  (She couldn't get up and was lying on the floor, her legs wouldn't cooperate for her.)   Did you visit your Primary Care Physician after you left the hospital, before you returned this time? No   Why weren't you able to visit your PCP? Did not want to go (Comment)  (Had the \"flu\" and didn't want to infect anyone)   Did you see a specialist, such as Cardiac, Pulmonary, Orthopedic Physician, etc. after you left the hospital? No   Who advised the patient to return to the hospital? Other (Comment)  (Case)   Does the patient report anything that got in the way of taking their medications? No   In our efforts to provide the best possible care to you and others like you, can you think of anything that we could have done to help you after you left the hospital the first time, so that you might not have needed to return so soon?  Other (Comment)  (Pt has restless, agitation and couldn't walk, case called EMS)

## 2021-11-05 NOTE — VIRTUAL HEALTH
Inpatient consult to Psychiatry  Consult performed by: Leanna Oconnor MD  Consult ordered by: Mansi Leblanc MD  Reason for consult: Depression, restlessness  Assessment/Recommendations: Department of Psychiatry   Psychiatric Assessment      Thank you very much for allowing us to participate in the care of this patient. Reason for Consult: Depression, restlessness    HISTORY OF PRESENT ILLNESS:    Patient is a 51-year-old  female with history of cocaine abuse and depression admitted following a fall and uncontrolled blood sugar levels. Psychiatry is consulted to evaluate for depression and severe restlessness. Patient reports that she has been compliant with her medication venlafaxine 150 mg, currently prescribed by her PCP. Mentions that she has been unable to care for herself at home and has been living in deplorable conditions. Reports feeling very sad depressed and down. Was tearful and labile at times during the conversation. Reports feeling very helpless and hopeless. Mentions her depression worsened since her  passed away in March. Reports very poor support. Has several losses in last couple years including death of her son and daughter. Mentions that she has nobody to check on her. Reports having trouble falling asleep and staying asleep. Reports poor appetite. Denies any active suicidal ideation plan or intent. Identifies henry in God as a protective factor. Could not elicit any manic or hypomanic symptoms. However she does have significant history of PTSD from previous trauma. Has some nightmares and flashbacks secondary to that. Also has ruminating thoughts around the loss of her family members.     PSYCHIATRIC HISTORY:      Outpatient psychiatric provider: PCP  Suicide attempts: Overdose 1 time  Inpatient psychiatric admissions: Reports 1 previous hospitalization    Lifetime Psychiatric Review of Systems         Obsessions and Compulsions: Denies Jennifer or Hypomania: Denies     Hallucinations: Denies     Panic Attacks:  Denies     Delusions:  Denies     Phobias:  Denies     Trauma: Positive    Prior to Admission medications   Medication Sig Start Date End Date Taking? Authorizing Provider  metFORMIN (GLUCOPHAGE) 1000 MG tablet TAKE 1 TABLET BY MOUTH 2 TIMES DAILY (WITH MEALS) 10/18/21   Pascual Montelongo MD  sennosides-docusate sodium (SENOKOT-S) 8.6-50 MG tablet Take 2 tablets by mouth daily 10/13/21   Ismael Patton MD  cetirizine (ZYRTEC) 10 MG tablet Take 1 tablet by mouth daily 8/9/21   Jose Macedo MD  insulin glargine (LANTUS SOLOSTAR) 100 UNIT/ML injection pen Inject 20 Units into the skin 2 times daily 8/9/21   Jose Macedo MD  pregabalin (LYRICA) 75 MG capsule Take 1 capsule by mouth 2 times daily for 30 days. Patient taking differently: Take 100 mg by mouth 2 times daily.   8/9/21 9/8/21  Melania Francois MD  pantoprazole (PROTONIX) 20 MG tablet Take 1 tablet by mouth 2 times daily (before meals) 7/22/21   Ismael Patton MD  venlafaxine (EFFEXOR XR) 150 MG extended release capsule Take 1 capsule by mouth daily (with breakfast) 7/22/21   Ismael Patton MD  dicyclomine (BENTYL) 10 MG capsule Take 1 capsule by mouth 4 times daily 7/12/21   Oral Bella MD  Insulin Pen Needle ([employer] PEN NEEDLES 31G) 31G X 8 MM MISC 1 each by Does not apply route daily 1/4/21   Ismael Patton MD  traZODone (DESYREL) 150 MG tablet Take 1 tablet by mouth nightly  Patient taking differently: Take 200 mg by mouth nightly  11/18/20   MD ELIZABETH QuintanillaSTYLE LITE strip 1 each by Does not apply route 3 times daily 11/11/20   MD Анна Quintanillayle Lancets MISC 1 each by Does not apply route 3 times daily 11/11/20   Ismael Patton MD  albuterol sulfate  (90 Base) MCG/ACT inhaler Inhale 2 puffs into the lungs every 4 hours as needed for Wheezing 10/20/20 9/22/21  Ismael Patton MD  FLOVENT  MCG/ACT inhaler Inhale 2 puffs into the lungs 2 times daily 10/20/20   Kacie Wong MD  budesonide-formoterol (SYMBICORT) 80-4.5 MCG/ACT AERO Inhale 2 puffs into the lungs 2 times daily 10/20/20   Kacie Wong MD       Medications:    Current Facility-Administered Medications: melatonin tablet 5 mg, 5 mg, Oral, Nightly PRN  potassium chloride (KLOR-CON M) extended release tablet 40 mEq, 40 mEq, Oral, PRN **OR** potassium bicarbonate (K-LYTE) disintegrating tablet 50 mEq, 50 mEq, Oral, PRN **OR** (DISCONTINUED) potassium chloride 10 mEq/100 mL IVPB (Peripheral Line), 10 mEq, IntraVENous, PRN  albuterol sulfate  (90 Base) MCG/ACT inhaler 2 puff, 2 puff, Inhalation, Q4H PRN  budesonide-formoterol (SYMBICORT) 80-4.5 MCG/ACT inhaler 2 puff, 2 puff, Inhalation, BID  (Held by provider) insulin glargine (LANTUS) injection vial 20 Units, 20 Units, SubCUTAneous, BID  pantoprazole (PROTONIX) tablet 20 mg, 20 mg, Oral, BID AC  pregabalin (LYRICA) capsule 75 mg, 75 mg, Oral, BID  venlafaxine (EFFEXOR XR) extended release capsule 150 mg, 150 mg, Oral, Daily with breakfast  dextrose 50 % IV solution, 12.5 g, IntraVENous, PRN  magnesium sulfate 1000 mg in dextrose 5% 100 mL IVPB, 1,000 mg, IntraVENous, PRN  polyethylene glycol (GLYCOLAX) packet 17 g, 17 g, Oral, Daily PRN  enoxaparin (LOVENOX) injection 40 mg, 40 mg, SubCUTAneous, Daily  dextrose 5 % and 0.45 % sodium chloride infusion, , IntraVENous, Continuous PRN  dicyclomine (BENTYL) capsule 10 mg, 10 mg, Oral, 4x Daily  glucose (GLUTOSE) 40 % oral gel 15 g, 15 g, Oral, PRN  dextrose 50 % IV solution, 12.5 g, IntraVENous, PRN  glucagon (rDNA) injection 1 mg, 1 mg, IntraMUSCular, PRN  dextrose 5 % solution, 100 mL/hr, IntraVENous, PRN  insulin regular (HUMULIN R;NOVOLIN R) 100 Units in sodium chloride 0.9 % 100 mL infusion, 0.1 Units/kg/hr, IntraVENous, Continuous  0.45 % sodium chloride infusion, , IntraVENous, Continuous  morphine injection 2 mg, 2 mg, IntraVENous, Q4H PRN     Past Medical History: Diagnosis Date   Acid reflux 5/29/2017   Acute cystitis with hematuria 10/11/2016   Acute non-recurrent maxillary sinusitis 11/28/2017   Asthma    Bipolar 1 disorder ([address] 1/4/2018   Bipolar disorder, mixed ([address]    BMI 34.0-34.9,adult 11/28/2017   Cannabis use disorder, severe, dependence ([address] 12/19/2017   Cerebrovascular accident (CVA) ([address] 6/14/2017   Chest pain 11/5/2016   Chronic renal insufficiency    Cocaine abuse ([address] 1/5/2018   COVID-19 virus RNA test result unknown    DDD (degenerative disc disease), cervical    Diabetes mellitus ([address]    Dizziness 6/13/2017   Fibromyalgia    History of stroke 9/9/2017   Homicidal ideation 11/6/2017   Hyperglycemia    Hypertension    Hypotension 1/18/2019   IDDM (insulin dependent diabetes mellitus) 12/21/2015   Lupus ([address]    Migraine    Neuropathy    Neuropathy    Polysubstance abuse ([address] 9/17/2017   Post traumatic stress disorder (PTSD)    Posttraumatic stress disorder 5/29/2017   Recurrent depression ([address] 5/29/2017   Recurrent major depressive disorder, in partial remission ([address] 11/28/2017   Screening mammogram, encounter for 11/28/2017   Severe recurrent major depression with psychotic features ([address] 12/19/2017   Severe recurrent major depression without psychotic features ([address] 12/19/2017   Stroke (cerebrum) ([address] 6/14/2017   Stroke ([address]    per chart notes   Suicidal ideation    Suicidal intent 3/10/2017   Vitamin D deficiency 11/28/2017   White matter changes 6/13/2017      Past Surgical History:       Procedure Laterality Date   ABDOMEN SURGERY     drain tube   ABSCESS DRAINAGE     right buttock   CATARACT REMOVAL WITH IMPLANT Bilateral    CHEST TUBE INSERTION     FINGER AMPUTATION Left 03/13/2021   LEFT RING FINER AMPUTATION performed by Dana Godoy MD at [address] Right 10/11/2021   AMPUTATION RIGHT INDEX FINGER   FINGER AMPUTATION Right 10/11/2021   AMPUTATION RIGHT INDEX FINGER performed by Jazmine Lovett MD at [address] HAND SURGERY Right 09/14/2021   I&D INDEX FINGER performed by Jazmine Lovett MD at [address] Right 09/15/2021   I&D INDEX FINGER performed by Jazmine Lovett MD at [address] LASIK Bilateral       Allergies: Bactrim (sulfamethoxazole-trimethoprim) and Adhesive tape      Social History:    Patient is born and raised around [facility]. Currently lives alone. Reports that she does get disability. Identifies very poor support from any friends or family members. SUBSTANCE USE HISTORY: Cocaine use        Family Medical and Psychiatric History:   Significant psychiatric history and family. No suicides in family. Problem Relation Age of Onset   Diabetes Mother    Stroke Mother    Diabetes Father    Diabetes Sister    Diabetes Brother    Other Son        GSW   No Known Problems Sister         Physical  /70   Pulse 92   Temp 97.4 °F (36.3 °C) (Temporal)   Resp 21   Ht 5' 5\" (1.651 m)   Wt 218 lb 4.1 oz (99 kg)   LMP  (LMP Unknown)   SpO2 94%   BMI 36.32 kg/m²         Mental Status Examination:  Level of consciousness:  Within normal limits  Appearance: hospital attire, lying in bed, fair grooming  Behavior/Motor: Restless, psychomotor agitation  Attitude toward examiner:  cooperative, attentive and good eye contact  Speech:  Spontaneous, normal rate and volume  Mood: Anxious  Affect: Labile  Thought processes:  Linear, goal directed, coherent  Thought content: Denies suicidal ideations   Denies homicidal ideations    Denies hallucinations   Denies delusions  Cognition:  Oriented to self, situation, location, date  Concentration clinically adequate  Memory age appropriate  Insight & Judgment:  fair    DSM-5 DIAGNOSIS:      MDD recurrent severe without psychosis  Stimulant use disorder -cocaine    Stressors     Severity of stressors is moderate  Source of stressors include:   Other psychosocial and environmental stressors    PLAN:    Patient's current restlessness is secondary to withdrawals from cocaine. Can use one-time dose of 1 mg Ativan to help with the withdrawal symptoms. Recommend connecting her with outpatient psychiatric services. Continue Effexor at 150 mg daily and recommend adding mirtazapine 7.5 mg at bedtime to help with the mood. Can consider home health aide at time of discharge. Does not require admission to [facility]. No further recommendations. We will sign off. Additional recommendations will follow the clinical course. Thank you very much for allowing us to participate in the care of this patient. Electronically signed by Hannah Farfan MD on 11/5/21 at 10:55 AM EDT            Patient Location:  [address] Stepdown    Provider Location (City/State):   [facility]    This virtual visit was conducted via interactive/real-time audio/video.

## 2021-11-05 NOTE — PROGRESS NOTES
Department of Family Medicine  Daily Progress Note  [facility]    Date:   11/5/2021  Patient name:  Justyn Corcoran  Date of admission:  11/4/2021  3:03 PM  MRN:   [id number]  YOB: 1965    SUBJECTIVE     Patient was seen and examined at bedside this AM. No acute events overnight. Patient doing better. Psych consult consent signed. Patient mentions some soreness in her chest, however, not shortness of breath or pressure-like chest pain. Some nausea however no vomiting. Patient mentions that she has not had a bowel movement since coming in. Urine is ok. Appetite ok. Review of Systems   Constitutional: Negative for activity change and fever. HENT: Negative for congestion and rhinorrhea. Respiratory: Negative for cough and shortness of breath. Cardiovascular: Positive for chest pain (Descibes as soreness). Negative for leg swelling. Gastrointestinal: Positive for nausea. Negative for abdominal pain and vomiting. Genitourinary: Negative for difficulty urinating and dysuria. Neurological: Positive for headaches. Negative for syncope. OBJECTIVE   /70   Pulse 92   Temp 97.4 °F (36.3 °C) (Temporal)   Resp 12   Ht 5' 5\" (1.651 m)   Wt 218 lb 4.1 oz (99 kg)   LMP  (LMP Unknown)   SpO2 92%   BMI 36.32 kg/m²      Physical Exam  Vitals and nursing note reviewed. Constitutional:       Comments: Restless   Cardiovascular:      Rate and Rhythm: Normal rate and regular rhythm. Pulmonary:      Effort: No respiratory distress. Abdominal:      Palpations: Abdomen is soft. Tenderness: There is no abdominal tenderness. There is no guarding or rebound. Musculoskeletal:      Right lower leg: No edema. Left lower leg: No edema. Neurological:      Mental Status: She is alert and oriented to person, place, and time.        Intake/Output:    Intake/Output Summary (Last 24 hours) at 11/5/2021 0937  Last data filed at 11/5/2021 0442  Gross per 24 hour Intake 907.5 ml   Output 800 ml   Net 107.5 ml     Imaging:  XR CHEST (2 VW)    Result Date: 11/4/2021  No acute process. CT HEAD WO CONTRAST    Result Date: 11/4/2021  No acute intracranial abnormality. Diffuse atrophic changes with periventricular white matter low attenuation suggesting chronic small vessel ischemia or other demyelinating processes     Laboratory Testing:  CBC:   Recent Labs     11/04/21  1640   WBC 6.5   HGB 10.6*        BMP:    Recent Labs     11/04/21 2011 11/04/21 2011 11/05/21 0203 11/05/21  0203 11/05/21  0730   *   < > 139   < > 137   K 3.8   < > 3.9   < > 3.8   CL 96*   < > 101   < > 102   CO2 20   < > 18*   < > 20   BUN 15   < > 16   < > 16   CREATININE 1.11*  --  1.49*  --  1.37*   GLUCOSE 383*   < > 365*   < > 191*    < > = values in this interval not displayed. Magnesium:   Lab Results   Component Value Date    MG 2.0 11/04/2021     Phosphorus:   Lab Results   Component Value Date    PHOS 5.3 11/05/2021     Ionized Calcium: No results found for: CAION   PT/INR:  No results found for: PROTIME, INR  PTT:    Lab Results   Component Value Date    APTT 22.4 07/07/2020     ASSESSMENT   Principal Problem:    DKA, type 2, not at goal [facility])  Active Problems:    Bipolar disorder, mixed ([facility]    Polysubstance abuse ([facility]    Bipolar 1 disorder ([facility]    Asthma exacerbation attacks    Acute kidney injury ([facility]  Resolved Problems:    * No resolved hospital problems. *    PLAN     DKA, 2/2 poor insulin compliance  - HbA1c in Sept 2021 was 14.2.  -NPO with sips of water  - Insulin drip was supposedly held and patient was eating. Restarted now.   -BMP q4h, initial Mag and Phos levels  -Glucose checks every hour  -Urinalysis reviewed and urine ketones: Small  -Betahydroxybutarate levels: 3.13  -IV 0.45% Normal Saline at 250 mL/hr  -Insulin Regular started at 0.1 Units/kg/hr  -D5 and 0.45% NS at 150 mL/hr when blood glucose less than 250 mg/dL  -Will monitor bicarb and anion gap, bridge with home lantus dose and begin diet PO when bicarb >15 and gap closed x 2 measurements  -Discontinue Insulin drip 2 hours post PO intake. -Hypoglycemia, phos and potassium replacement protocols in place     MARTIN  Elevated Cr at 1.33  Continue IV 0.45% NS at 250 mL/h  Repeat BMP tomorrow     Fall r/o Rhabdomyolysis (resolved)  Negative head CT  Elevated CK at 245. Myoglobin 52  Continue IV 0.45% NS at 250 mL/h     Asthma  Continue albuterol  Continue symbicort     Bipolar Disorder  Continue Effexor 150 mg daily  Psych consulted     Polysubstance Abuse  UDS positive for cocaine  Psych consulted     DVT Prophylaxis: Lovenox 40 mg subcu daily  GI Prophylaxis: Continue home dose protonix 20 mg BID  Diet: NPO with sips of water  Dispo: Awaiting     OT/PT/Social work consults in place.     This plan will be discussed with the rounding attending: Екатерина Keith MD.      Maria M Bowers MD   Family Medicine Resident Physician  11/5/2021 9:44 AM

## 2021-11-05 NOTE — PLAN OF CARE
PLAN OF CARE:    Notified by nurse that pt is restless. Pt was seen and examined. She c/o sleep disturbance, fearful thoughts, diffuse whole body myalgias, back pain and leg pain. Reports pain and restless limb movements started after recent fall after which she was on floor for multiple hours. Pt also reports major events in her life this year including past loss of spouse and children causing increased stress. In regards to MSK complaints; Pt is tender to light palpation on lower back,  No bruising or edema on physical exam,   CK from 1 day ago at 245; Suspect rhabdomyolysis; Patient on IVF. In regards to mood and sleep complaints; Concern for possible hypomanic episode; Pt is talkative, distractible, speech is constant, and flight of ideas as well as psychomotor agitation are present on physical exam.   Will continue to hold home trazodone 2/2 concerns of exacerbating jennifer in pt with known hx of bipolar disorder. Will add melatonin to help pt with sleep disturbance. Psych has been consulted; will await further recommendations. In regards to DKA;   Pt's insulin drip had been turned off  And pt reported having eaten a sandwich earlier. However, subcutaneous insulin was not given prior to manual discontinuation of insulin infusion   and no K replacement given despite orders in place for insulin bridging and potassium replacement. Latest BMP shows:   Na 132 (Na corrected for hyperglycemia is approximately 137),    K 3.8   Cl 96   Bicarb 15   AG 16   Glu 383  Plan;   Restart insulin drip   Administer lantus x1 dose   Administer 30 mEq K   Continue IVF: 1/2 NS    Recheck BMP  Discussed with nursing staff. Will f/u with repeat BMP and POC glucose and readjust orders as appropriate.      Electronically signed by Renetta Rae DO on 11/5/2021 at 1:42 AM

## 2021-11-05 NOTE — CARE COORDINATION
Attempted to meet with pt regarding referral for social determinants and grief. Pt very drowsy and provided writer with very little information. Pt did state she is still living at the [facility] apartments and that she has not followed up with [facility] regarding her depression. Referral was made to Darren Burrows with [facility] program in June, with request to assist pt with cleaning service and providing some furniture for pt. Message left for [facility] to see if they have been able to assist pt with any services, await return call. Pt very restless in bed telling writer that \"I want to hear what you are saying but I do not feel coherent at this time\". Pt  requests SW come back once she is able to get some rest.   Psych evaluation was completed with recommendation for connecting pt with outpatient psychiatric services. Pt has been linked with [facility] in the past for depression and expressed desire to resume services during social work assessment in June. However, pt has not  followed up with any mental health agency regarding depression. List of [address] left with RN to provide pt once she is more alert to review list.    Will follow.

## 2021-11-05 NOTE — PROGRESS NOTES
Physical Therapy        Physical Therapy Cancel Note      DATE: 2021    NAME: Srikanth Alfredo  MRN: [id number]   : 1965      Patient not seen this date for Physical Therapy due to:    Patient Declined: d/t stating she needs sleep right now.  Ck back       Electronically signed by Donna Cerna PT on 2021 at 1:58 PM

## 2021-11-06 LAB
ANION GAP SERPL CALCULATED.3IONS-SCNC: 10 MMOL/L (ref 9–17)
ANION GAP SERPL CALCULATED.3IONS-SCNC: 11 MMOL/L (ref 9–17)
BUN BLDV-MCNC: 12 MG/DL (ref 6–20)
BUN BLDV-MCNC: 12 MG/DL (ref 6–20)
BUN/CREAT BLD: ABNORMAL (ref 9–20)
BUN/CREAT BLD: ABNORMAL (ref 9–20)
CALCIUM SERPL-MCNC: 8.4 MG/DL (ref 8.6–10.4)
CALCIUM SERPL-MCNC: 8.7 MG/DL (ref 8.6–10.4)
CHLORIDE BLD-SCNC: 102 MMOL/L (ref 98–107)
CHLORIDE BLD-SCNC: 105 MMOL/L (ref 98–107)
CO2: 19 MMOL/L (ref 20–31)
CO2: 21 MMOL/L (ref 20–31)
CREAT SERPL-MCNC: 0.86 MG/DL (ref 0.5–0.9)
CREAT SERPL-MCNC: 0.89 MG/DL (ref 0.5–0.9)
GFR AFRICAN AMERICAN: >60 ML/MIN
GFR AFRICAN AMERICAN: >60 ML/MIN
GFR NON-AFRICAN AMERICAN: >60 ML/MIN
GFR NON-AFRICAN AMERICAN: >60 ML/MIN
GFR SERPL CREATININE-BSD FRML MDRD: ABNORMAL ML/MIN/{1.73_M2}
GLUCOSE BLD-MCNC: 226 MG/DL (ref 65–105)
GLUCOSE BLD-MCNC: 283 MG/DL (ref 65–105)
GLUCOSE BLD-MCNC: 330 MG/DL (ref 65–105)
GLUCOSE BLD-MCNC: 398 MG/DL (ref 70–99)
GLUCOSE BLD-MCNC: 411 MG/DL (ref 70–99)
HCT VFR BLD CALC: 35.1 % (ref 36.3–47.1)
HEMOGLOBIN: 10.5 G/DL (ref 11.9–15.1)
MCH RBC QN AUTO: 31 PG (ref 25.2–33.5)
MCHC RBC AUTO-ENTMCNC: 29.9 G/DL (ref 28.4–34.8)
MCV RBC AUTO: 103.5 FL (ref 82.6–102.9)
NRBC AUTOMATED: 0 PER 100 WBC
PDW BLD-RTO: 13.2 % (ref 11.8–14.4)
PHOSPHORUS: 3.2 MG/DL (ref 2.6–4.5)
PHOSPHORUS: 3.8 MG/DL (ref 2.6–4.5)
PLATELET # BLD: 321 K/UL (ref 138–453)
PMV BLD AUTO: 10.8 FL (ref 8.1–13.5)
POTASSIUM SERPL-SCNC: 4.5 MMOL/L (ref 3.7–5.3)
POTASSIUM SERPL-SCNC: 4.6 MMOL/L (ref 3.7–5.3)
POTASSIUM SERPL-SCNC: 4.6 MMOL/L (ref 3.7–5.3)
RBC # BLD: 3.39 M/UL (ref 3.95–5.11)
SODIUM BLD-SCNC: 133 MMOL/L (ref 135–144)
SODIUM BLD-SCNC: 135 MMOL/L (ref 135–144)
WBC # BLD: 5.5 K/UL (ref 3.5–11.3)

## 2021-11-06 PROCEDURE — 6360000002 HC RX W HCPCS: Performed by: STUDENT IN AN ORGANIZED HEALTH CARE EDUCATION/TRAINING PROGRAM

## 2021-11-06 PROCEDURE — 84132 ASSAY OF SERUM POTASSIUM: CPT

## 2021-11-06 PROCEDURE — 2060000000 HC ICU INTERMEDIATE R&B

## 2021-11-06 PROCEDURE — 82947 ASSAY GLUCOSE BLOOD QUANT: CPT

## 2021-11-06 PROCEDURE — 84100 ASSAY OF PHOSPHORUS: CPT

## 2021-11-06 PROCEDURE — 6370000000 HC RX 637 (ALT 250 FOR IP)

## 2021-11-06 PROCEDURE — 85027 COMPLETE CBC AUTOMATED: CPT

## 2021-11-06 PROCEDURE — 99231 SBSQ HOSP IP/OBS SF/LOW 25: CPT | Performed by: FAMILY MEDICINE

## 2021-11-06 PROCEDURE — 6370000000 HC RX 637 (ALT 250 FOR IP): Performed by: STUDENT IN AN ORGANIZED HEALTH CARE EDUCATION/TRAINING PROGRAM

## 2021-11-06 PROCEDURE — 80048 BASIC METABOLIC PNL TOTAL CA: CPT

## 2021-11-06 PROCEDURE — 94640 AIRWAY INHALATION TREATMENT: CPT

## 2021-11-06 PROCEDURE — 36415 COLL VENOUS BLD VENIPUNCTURE: CPT

## 2021-11-06 RX ORDER — KETOROLAC TROMETHAMINE 30 MG/ML
30 INJECTION, SOLUTION INTRAMUSCULAR; INTRAVENOUS ONCE
Status: COMPLETED | OUTPATIENT
Start: 2021-11-06 | End: 2021-11-06

## 2021-11-06 RX ORDER — INSULIN GLARGINE 100 [IU]/ML
30 INJECTION, SOLUTION SUBCUTANEOUS 2 TIMES DAILY
Status: DISCONTINUED | OUTPATIENT
Start: 2021-11-06 | End: 2021-11-08 | Stop reason: HOSPADM

## 2021-11-06 RX ADMIN — MORPHINE SULFATE 2 MG: 4 INJECTION, SOLUTION INTRAMUSCULAR; INTRAVENOUS at 21:55

## 2021-11-06 RX ADMIN — KETOROLAC TROMETHAMINE 30 MG: 30 INJECTION, SOLUTION INTRAMUSCULAR; INTRAVENOUS at 20:21

## 2021-11-06 RX ADMIN — MORPHINE SULFATE 2 MG: 4 INJECTION, SOLUTION INTRAMUSCULAR; INTRAVENOUS at 17:56

## 2021-11-06 RX ADMIN — VENLAFAXINE HYDROCHLORIDE 150 MG: 150 CAPSULE, EXTENDED RELEASE ORAL at 08:13

## 2021-11-06 RX ADMIN — PREGABALIN 75 MG: 75 CAPSULE ORAL at 08:13

## 2021-11-06 RX ADMIN — DICYCLOMINE HYDROCHLORIDE 10 MG: 10 CAPSULE ORAL at 08:13

## 2021-11-06 RX ADMIN — BUDESONIDE AND FORMOTEROL FUMARATE DIHYDRATE 2 PUFF: 80; 4.5 AEROSOL RESPIRATORY (INHALATION) at 09:01

## 2021-11-06 RX ADMIN — INSULIN LISPRO 3 UNITS: 100 INJECTION, SOLUTION INTRAVENOUS; SUBCUTANEOUS at 20:22

## 2021-11-06 RX ADMIN — PANTOPRAZOLE SODIUM 20 MG: 20 TABLET, DELAYED RELEASE ORAL at 08:13

## 2021-11-06 RX ADMIN — INSULIN GLARGINE 25 UNITS: 100 INJECTION, SOLUTION SUBCUTANEOUS at 08:13

## 2021-11-06 RX ADMIN — MORPHINE SULFATE 2 MG: 4 INJECTION, SOLUTION INTRAMUSCULAR; INTRAVENOUS at 08:44

## 2021-11-06 RX ADMIN — Medication 5 MG: at 20:23

## 2021-11-06 RX ADMIN — PREGABALIN 75 MG: 75 CAPSULE ORAL at 20:20

## 2021-11-06 RX ADMIN — PANTOPRAZOLE SODIUM 20 MG: 20 TABLET, DELAYED RELEASE ORAL at 17:49

## 2021-11-06 RX ADMIN — MORPHINE SULFATE 2 MG: 4 INJECTION, SOLUTION INTRAMUSCULAR; INTRAVENOUS at 03:45

## 2021-11-06 RX ADMIN — INSULIN LISPRO 9 UNITS: 100 INJECTION, SOLUTION INTRAVENOUS; SUBCUTANEOUS at 17:48

## 2021-11-06 RX ADMIN — BUDESONIDE AND FORMOTEROL FUMARATE DIHYDRATE 2 PUFF: 80; 4.5 AEROSOL RESPIRATORY (INHALATION) at 20:51

## 2021-11-06 RX ADMIN — MIRTAZAPINE 7.5 MG: 15 TABLET, FILM COATED ORAL at 20:20

## 2021-11-06 RX ADMIN — MORPHINE SULFATE 2 MG: 4 INJECTION, SOLUTION INTRAMUSCULAR; INTRAVENOUS at 12:14

## 2021-11-06 RX ADMIN — INSULIN GLARGINE 30 UNITS: 100 INJECTION, SOLUTION SUBCUTANEOUS at 20:22

## 2021-11-06 RX ADMIN — INSULIN LISPRO 10 UNITS: 100 INJECTION, SOLUTION INTRAVENOUS; SUBCUTANEOUS at 08:13

## 2021-11-06 RX ADMIN — INSULIN LISPRO 12 UNITS: 100 INJECTION, SOLUTION INTRAVENOUS; SUBCUTANEOUS at 12:15

## 2021-11-06 ASSESSMENT — ENCOUNTER SYMPTOMS
SORE THROAT: 0
WHEEZING: 0
NAUSEA: 0
DIARRHEA: 0
VOMITING: 0
RHINORRHEA: 0
COUGH: 0
SHORTNESS OF BREATH: 0
ABDOMINAL PAIN: 0

## 2021-11-06 ASSESSMENT — PAIN SCALES - GENERAL
PAINLEVEL_OUTOF10: 7
PAINLEVEL_OUTOF10: 3
PAINLEVEL_OUTOF10: 10
PAINLEVEL_OUTOF10: 10
PAINLEVEL_OUTOF10: 9
PAINLEVEL_OUTOF10: 10
PAINLEVEL_OUTOF10: 10

## 2021-11-06 NOTE — FLOWSHEET NOTE
Assessment;  Patient is a 49year old female in room 430.  paged by nurse as patient requested prayer. Intervention:   was ministry of presence. Patient was sleeping,  prayed silently in patient's room    Outcome:    Plan:  Chaplains will remain available for spiritual and emotional support as needed.      11/06/21 0509   Encounter Summary   Services provided to: Patient   Referral/Consult From: Nurse Riaz Elizabeth; Family members   Continue Visiting   (11/6/2021)   Complexity of Encounter Moderate   Length of Encounter 15 minutes   Spiritual Assessment Completed Yes   Crisis   Type Emotional distress   Assessment Sleeping   Intervention Prayer   Outcome Did not respond

## 2021-11-06 NOTE — PROGRESS NOTES
Patient refusing blood sugar checks and insulin coverage. Patient educated on importance of adherence to med regimen and to monitor blood sugars however continues to refuse. Resident on call notified of above. Day team to speak with patient.

## 2021-11-06 NOTE — PROGRESS NOTES
Department of Family Medicine  Daily Progress Note  [facility], [facility].    Date:   11/6/2021  Patient name:  Drew Rosenberg  Date of admission:  11/4/2021  3:03 PM  MRN:   [id number]  YOB: 1965    SUBJECTIVE     Patient was seen and examined at bedside this AM. No acute events overnight. Pt complains of fevers overnight, temp measurements all normal range. Complains of dizziness, boil on right inner thigh. Case was discussed with nursing staff. Review of Systems   Constitutional: Positive for fever. Negative for chills and diaphoresis. HENT: Negative for congestion, rhinorrhea and sore throat. Respiratory: Negative for cough, shortness of breath and wheezing. Cardiovascular: Negative for chest pain, palpitations and leg swelling. Gastrointestinal: Negative for abdominal pain, diarrhea, nausea and vomiting. Genitourinary: Negative for difficulty urinating and dysuria. Neurological: Positive for dizziness. Negative for light-headedness and headaches. OBJECTIVE   /82   Pulse 103   Temp 97.2 °F (36.2 °C) (Temporal)   Resp 18   Ht 5' 5\" (1.651 m)   Wt 218 lb 4.1 oz (99 kg)   LMP  (LMP Unknown)   SpO2 94%   BMI 36.32 kg/m²      Physical Exam  Vitals and nursing note reviewed. Exam conducted with a chaperone present. Constitutional:       General: She is not in acute distress. Eyes:      Extraocular Movements: Extraocular movements intact. Conjunctiva/sclera: Conjunctivae normal.   Cardiovascular:      Rate and Rhythm: Normal rate and regular rhythm. Pulses: Normal pulses. Heart sounds: Normal heart sounds. No murmur heard. No gallop. Pulmonary:      Effort: Pulmonary effort is normal. No respiratory distress. Breath sounds: Normal breath sounds. No wheezing or rales. Abdominal:      General: Bowel sounds are normal. There is no distension. Palpations: Abdomen is soft. Tenderness: There is no abdominal tenderness. There is no guarding. Musculoskeletal:      Right lower leg: No edema. Left lower leg: No edema. Skin:     Comments: 2cm boil on right inner thigh   Neurological:      General: No focal deficit present. Mental Status: She is alert. Intake/Output:    Intake/Output Summary (Last 24 hours) at 11/6/2021 0947  Last data filed at 11/5/2021 2300  Gross per 24 hour   Intake 2867 ml   Output 400 ml   Net 2467 ml         Laboratory Testing:  CBC:   Recent Labs     11/06/21  0605   WBC 5.5   HGB 10.5*        BMP:    Recent Labs     11/05/21  1453 11/05/21  1453 11/05/21  2216 11/05/21  2218 11/06/21  0608      < > 135   < > 135   K 3.9   < > 4.8   < > 4.6      < > 103   < > 105   CO2 20   < > 18*   < > 19*   BUN 14   < > 14   < > 12   CREATININE 0.98*  --  1.12*  --  0.89   GLUCOSE 84   < > 365*   < > 398*    < > = values in this interval not displayed. Magnesium:   Lab Results   Component Value Date    MG 2.0 11/04/2021     Phosphorus:   Lab Results   Component Value Date    PHOS 3.8 11/06/2021     Ionized Calcium: No results found for: CAION   PT/INR:  No results found for: PROTIME, INR  PTT:    Lab Results   Component Value Date    APTT 22.4 07/07/2020         ASSESSMENT   Principal Problem:    DKA, type 2, not at goal [facility])  Active Problems:    Polysubstance abuse ([facility] [address]    Asthma exacerbation attacks    Acute kidney injury ([facility] [address]    Diabetic ketoacidosis without coma associated with type 2 diabetes mellitus ([facility] [address]    Major depression  Resolved Problems:    * No resolved hospital problems.  *      PLAN     *DKA insulin type 2 diabetes mellitus with poor insulin compliance-improved  -Patient is no longer in DKA  -Lantus increased to 25 units twice daily  -Increase to High dose insulin sliding scale  -Glucose checks and hypoglycemia protocol    *MARTIN-resolved  -We will discontinue IV fluids    *Fall secondary to weakness  -Benefit from PT/OT  -May require placement    *Asthma not in acute exacerbation  -Symbicort  -Albuterol as needed    *Severe major depression without psychosis  -Continue Effexor 150 mg daily  -Mirtazapine 7.5 mg nightly    *Cocaine use/withdrawal  -May use Ativan for agitation per psych    DVT: Lovenox  GI ppx: Protonix  Code: Full  Diet: Diabetic      This plan will be discussed with the rounding attending: Adelia Siemens, MD.      Sofya Lewis MD   Family Medicine Resident Physician  11/6/2021 9:47 AM

## 2021-11-07 ENCOUNTER — APPOINTMENT (OUTPATIENT)
Dept: MRI IMAGING | Age: 56
DRG: 420 | End: 2021-11-07
Payer: COMMERCIAL

## 2021-11-07 PROBLEM — E11.10 DIABETIC KETOACIDOSIS WITHOUT COMA ASSOCIATED WITH TYPE 2 DIABETES MELLITUS (HCC): Status: RESOLVED | Noted: 2021-09-15 | Resolved: 2021-11-07

## 2021-11-07 PROBLEM — N17.9 ACUTE KIDNEY INJURY (HCC): Status: RESOLVED | Noted: 2021-02-07 | Resolved: 2021-11-07

## 2021-11-07 LAB
ANION GAP SERPL CALCULATED.3IONS-SCNC: 9 MMOL/L (ref 9–17)
BUN BLDV-MCNC: 12 MG/DL (ref 6–20)
BUN/CREAT BLD: ABNORMAL (ref 9–20)
CALCIUM SERPL-MCNC: 8.7 MG/DL (ref 8.6–10.4)
CHLORIDE BLD-SCNC: 100 MMOL/L (ref 98–107)
CO2: 23 MMOL/L (ref 20–31)
CREAT SERPL-MCNC: 0.9 MG/DL (ref 0.5–0.9)
CULTURE: NORMAL
DIRECT EXAM: NORMAL
GFR AFRICAN AMERICAN: >60 ML/MIN
GFR NON-AFRICAN AMERICAN: >60 ML/MIN
GFR SERPL CREATININE-BSD FRML MDRD: ABNORMAL ML/MIN/{1.73_M2}
GFR SERPL CREATININE-BSD FRML MDRD: ABNORMAL ML/MIN/{1.73_M2}
GLUCOSE BLD-MCNC: 145 MG/DL (ref 65–105)
GLUCOSE BLD-MCNC: 192 MG/DL (ref 65–105)
GLUCOSE BLD-MCNC: 230 MG/DL (ref 65–105)
GLUCOSE BLD-MCNC: 386 MG/DL (ref 65–105)
GLUCOSE BLD-MCNC: 420 MG/DL (ref 70–99)
HCT VFR BLD CALC: 32.2 % (ref 36.3–47.1)
HEMOGLOBIN: 10.2 G/DL (ref 11.9–15.1)
Lab: NORMAL
MCH RBC QN AUTO: 31.4 PG (ref 25.2–33.5)
MCHC RBC AUTO-ENTMCNC: 31.7 G/DL (ref 28.4–34.8)
MCV RBC AUTO: 99.1 FL (ref 82.6–102.9)
NRBC AUTOMATED: 0 PER 100 WBC
PDW BLD-RTO: 13.1 % (ref 11.8–14.4)
PLATELET # BLD: 327 K/UL (ref 138–453)
PMV BLD AUTO: 10.8 FL (ref 8.1–13.5)
POTASSIUM SERPL-SCNC: 4.4 MMOL/L (ref 3.7–5.3)
RBC # BLD: 3.25 M/UL (ref 3.95–5.11)
SODIUM BLD-SCNC: 132 MMOL/L (ref 135–144)
SPECIMEN DESCRIPTION: NORMAL
WBC # BLD: 6.5 K/UL (ref 3.5–11.3)

## 2021-11-07 PROCEDURE — 6370000000 HC RX 637 (ALT 250 FOR IP)

## 2021-11-07 PROCEDURE — 6370000000 HC RX 637 (ALT 250 FOR IP): Performed by: STUDENT IN AN ORGANIZED HEALTH CARE EDUCATION/TRAINING PROGRAM

## 2021-11-07 PROCEDURE — 73220 MRI UPPR EXTREMITY W/O&W/DYE: CPT

## 2021-11-07 PROCEDURE — A9576 INJ PROHANCE MULTIPACK: HCPCS

## 2021-11-07 PROCEDURE — 2060000000 HC ICU INTERMEDIATE R&B

## 2021-11-07 PROCEDURE — 6360000004 HC RX CONTRAST MEDICATION

## 2021-11-07 PROCEDURE — 6360000002 HC RX W HCPCS: Performed by: STUDENT IN AN ORGANIZED HEALTH CARE EDUCATION/TRAINING PROGRAM

## 2021-11-07 PROCEDURE — 36415 COLL VENOUS BLD VENIPUNCTURE: CPT

## 2021-11-07 PROCEDURE — 99231 SBSQ HOSP IP/OBS SF/LOW 25: CPT | Performed by: FAMILY MEDICINE

## 2021-11-07 PROCEDURE — 82947 ASSAY GLUCOSE BLOOD QUANT: CPT

## 2021-11-07 PROCEDURE — 85027 COMPLETE CBC AUTOMATED: CPT

## 2021-11-07 PROCEDURE — 80048 BASIC METABOLIC PNL TOTAL CA: CPT

## 2021-11-07 RX ORDER — SODIUM CHLORIDE 0.9 % (FLUSH) 0.9 %
10 SYRINGE (ML) INJECTION PRN
Status: DISCONTINUED | OUTPATIENT
Start: 2021-11-07 | End: 2021-11-08 | Stop reason: HOSPADM

## 2021-11-07 RX ADMIN — INSULIN GLARGINE 30 UNITS: 100 INJECTION, SOLUTION SUBCUTANEOUS at 20:54

## 2021-11-07 RX ADMIN — INSULIN LISPRO 6 UNITS: 100 INJECTION, SOLUTION INTRAVENOUS; SUBCUTANEOUS at 18:41

## 2021-11-07 RX ADMIN — PREGABALIN 75 MG: 75 CAPSULE ORAL at 20:49

## 2021-11-07 RX ADMIN — INSULIN GLARGINE 30 UNITS: 100 INJECTION, SOLUTION SUBCUTANEOUS at 08:22

## 2021-11-07 RX ADMIN — MORPHINE SULFATE 2 MG: 4 INJECTION, SOLUTION INTRAMUSCULAR; INTRAVENOUS at 10:47

## 2021-11-07 RX ADMIN — Medication 5 MG: at 22:03

## 2021-11-07 RX ADMIN — MORPHINE SULFATE 2 MG: 4 INJECTION, SOLUTION INTRAMUSCULAR; INTRAVENOUS at 16:08

## 2021-11-07 RX ADMIN — PANTOPRAZOLE SODIUM 20 MG: 20 TABLET, DELAYED RELEASE ORAL at 18:41

## 2021-11-07 RX ADMIN — MORPHINE SULFATE 2 MG: 4 INJECTION, SOLUTION INTRAMUSCULAR; INTRAVENOUS at 20:49

## 2021-11-07 RX ADMIN — INSULIN LISPRO 15 UNITS: 100 INJECTION, SOLUTION INTRAVENOUS; SUBCUTANEOUS at 08:21

## 2021-11-07 RX ADMIN — GADOTERIDOL 19 ML: 279.3 INJECTION, SOLUTION INTRAVENOUS at 18:13

## 2021-11-07 RX ADMIN — DICYCLOMINE HYDROCHLORIDE 10 MG: 10 CAPSULE ORAL at 08:21

## 2021-11-07 RX ADMIN — MIRTAZAPINE 7.5 MG: 15 TABLET, FILM COATED ORAL at 20:49

## 2021-11-07 RX ADMIN — PREGABALIN 75 MG: 75 CAPSULE ORAL at 08:21

## 2021-11-07 RX ADMIN — INSULIN LISPRO 3 UNITS: 100 INJECTION, SOLUTION INTRAVENOUS; SUBCUTANEOUS at 12:47

## 2021-11-07 RX ADMIN — INSULIN LISPRO 2 UNITS: 100 INJECTION, SOLUTION INTRAVENOUS; SUBCUTANEOUS at 20:54

## 2021-11-07 RX ADMIN — MORPHINE SULFATE 2 MG: 4 INJECTION, SOLUTION INTRAMUSCULAR; INTRAVENOUS at 01:47

## 2021-11-07 RX ADMIN — VENLAFAXINE HYDROCHLORIDE 150 MG: 150 CAPSULE, EXTENDED RELEASE ORAL at 08:21

## 2021-11-07 RX ADMIN — MORPHINE SULFATE 2 MG: 4 INJECTION, SOLUTION INTRAMUSCULAR; INTRAVENOUS at 06:39

## 2021-11-07 RX ADMIN — DICYCLOMINE HYDROCHLORIDE 10 MG: 10 CAPSULE ORAL at 20:49

## 2021-11-07 RX ADMIN — PANTOPRAZOLE SODIUM 20 MG: 20 TABLET, DELAYED RELEASE ORAL at 06:39

## 2021-11-07 ASSESSMENT — PAIN DESCRIPTION - ONSET: ONSET: ON-GOING

## 2021-11-07 ASSESSMENT — ENCOUNTER SYMPTOMS
VOMITING: 0
COUGH: 0
NAUSEA: 0
SHORTNESS OF BREATH: 0
SORE THROAT: 0
ABDOMINAL DISTENTION: 0

## 2021-11-07 ASSESSMENT — PAIN - FUNCTIONAL ASSESSMENT: PAIN_FUNCTIONAL_ASSESSMENT: ACTIVITIES ARE NOT PREVENTED

## 2021-11-07 ASSESSMENT — PAIN SCALES - GENERAL
PAINLEVEL_OUTOF10: 10
PAINLEVEL_OUTOF10: 9
PAINLEVEL_OUTOF10: 10
PAINLEVEL_OUTOF10: 0
PAINLEVEL_OUTOF10: 10

## 2021-11-07 ASSESSMENT — PAIN DESCRIPTION - ORIENTATION: ORIENTATION: RIGHT

## 2021-11-07 ASSESSMENT — PAIN DESCRIPTION - PAIN TYPE: TYPE: ACUTE PAIN

## 2021-11-07 ASSESSMENT — PAIN DESCRIPTION - PROGRESSION: CLINICAL_PROGRESSION: NOT CHANGED

## 2021-11-07 ASSESSMENT — PAIN DESCRIPTION - DESCRIPTORS: DESCRIPTORS: CONSTANT;ACHING

## 2021-11-07 ASSESSMENT — PAIN DESCRIPTION - FREQUENCY: FREQUENCY: CONTINUOUS

## 2021-11-07 ASSESSMENT — PAIN DESCRIPTION - LOCATION: LOCATION: SHOULDER

## 2021-11-07 NOTE — PLAN OF CARE
Problem: Falls - Risk of:  Goal: Will remain free from falls  Description: Will remain free from falls  11/7/2021 0320 by Cristian Engel RN  Outcome: Ongoing  11/6/2021 1831 by Ankit Ch RN  Outcome: Ongoing  Goal: Absence of physical injury  Description: Absence of physical injury  11/7/2021 0320 by Cristian Engel RN  Outcome: Ongoing  11/6/2021 1831 by Ankit Ch RN  Outcome: Ongoing     Problem: Skin Integrity:  Goal: Will show no infection signs and symptoms  Description: Will show no infection signs and symptoms  11/7/2021 0320 by Cristian Engel RN  Outcome: Ongoing  11/6/2021 1831 by Ankit Ch RN  Outcome: Ongoing  Goal: Absence of new skin breakdown  Description: Absence of new skin breakdown  11/7/2021 0320 by Cristian Engel RN  Outcome: Ongoing  11/6/2021 1831 by Ankit Ch RN  Outcome: Ongoing     Problem: Pain:  Goal: Pain level will decrease  Description: Pain level will decrease  11/7/2021 0320 by Cristian Engel RN  Outcome: Ongoing  11/6/2021 1831 by Ankit Ch RN  Outcome: Ongoing  Goal: Control of acute pain  Description: Control of acute pain  11/7/2021 0320 by Cristian Engel RN  Outcome: Ongoing  11/6/2021 1831 by Ankit Ch RN  Outcome: Ongoing  Goal: Control of chronic pain  Description: Control of chronic pain  11/7/2021 0320 by Cristian Engel RN  Outcome: Ongoing  11/6/2021 1831 by Ankit Ch RN  Outcome: Ongoing

## 2021-11-07 NOTE — PLAN OF CARE
Problem: Falls - Risk of:  Goal: Will remain free from falls  Description: Will remain free from falls  11/7/2021 1708 by Elvira Pool RN  Outcome: Ongoing     Problem: Falls - Risk of:  Goal: Absence of physical injury  Description: Absence of physical injury  11/7/2021 1708 by Elvira Pool RN  Outcome: Ongoing     Problem: Skin Integrity:  Goal: Will show no infection signs and symptoms  Description: Will show no infection signs and symptoms  11/7/2021 1708 by Elvira Pool RN  Outcome: Ongoing     Problem: Skin Integrity:  Goal: Absence of new skin breakdown  Description: Absence of new skin breakdown  11/7/2021 1708 by Elvira Pool RN  Outcome: Ongoing     Problem: Pain:  Goal: Pain level will decrease  Description: Pain level will decrease  11/7/2021 1708 by Elvira Pool RN  Outcome: Ongoing     Problem: Pain:  Goal: Control of acute pain  Description: Control of acute pain  11/7/2021 1708 by Elvira Pool RN  Outcome: Ongoing     Problem: Pain:  Goal: Control of chronic pain  Description: Control of chronic pain  11/7/2021 1708 by Elvira Pool RN  Outcome: Ongoing

## 2021-11-07 NOTE — PROGRESS NOTES
Department of Family Medicine  Daily Progress Note  Margy [facility]    Date:   11/7/2021  Patient name:  Mj Espinosa  Date of admission:  11/4/2021  3:03 PM  MRN:   [id number]  YOB: 1965    SUBJECTIVE     Patient was seen and examined at bedside this AM. No acute events overnight. Patient denied any CP, SOB, fever, chills, nausea, vomiting or diarrhea. Patient denied any new complaints or concerns. Case was discussed with nursing staff. Patient mentioned a boil in her right thigh and was asking about when her right hand stitches would be removed. No other acute concerns at this time. Review of Systems   Constitutional: Negative for activity change and fever. HENT: Negative for congestion and sore throat. Respiratory: Negative for cough and shortness of breath. Cardiovascular: Negative for chest pain and leg swelling. Gastrointestinal: Negative for abdominal distention, nausea and vomiting. Genitourinary: Negative for difficulty urinating and dyspareunia. Neurological: Negative for syncope and headaches. OBJECTIVE   /77   Pulse 88   Temp 97.9 °F (36.6 °C) (Temporal)   Resp 18   Ht 5' 5\" (1.651 m)   Wt 218 lb 7.6 oz (99.1 kg)   LMP  (LMP Unknown)   SpO2 97%   BMI 36.36 kg/m²      Physical Exam  Vitals and nursing note reviewed. Constitutional:       General: She is not in acute distress. Appearance: She is not ill-appearing or toxic-appearing. Cardiovascular:      Rate and Rhythm: Normal rate and regular rhythm. Heart sounds: Normal heart sounds. Pulmonary:      Effort: No respiratory distress. Breath sounds: Normal breath sounds. No wheezing. Abdominal:      Palpations: Abdomen is soft. Tenderness: There is no abdominal tenderness. There is no guarding or rebound. Musculoskeletal:         General: No swelling or tenderness. Right lower leg: No edema. Left lower leg: No edema.       Comments: Right hand index finger amputated. Stiches in place. Slightly tender to touch, likely due to recent surgery. Small serous discharge. No fever. No elevated WBCs   Skin:     Comments: Right inner thigh boil. Improved per nurse and patient from yesterday. Slightly tender to touch. Not fluctuant. Patient mentions that it drained this AM   Neurological:      Mental Status: She is alert and oriented to person, place, and time. Psychiatric:      Comments: Restless       Intake/Output:    Intake/Output Summary (Last 24 hours) at 11/7/2021 0946  Last data filed at 11/6/2021 1800  Gross per 24 hour   Intake 960 ml   Output --   Net 960 ml     Imaging:    No new radiology    Laboratory Testing:  CBC:   Recent Labs     11/07/21  0643   WBC 6.5   HGB 10.2*        BMP:    Recent Labs     11/06/21  0608 11/06/21  0608 11/06/21  0948 11/06/21  1449 11/07/21  0643      < > 133*  --  132*   K 4.6   < > 4.6   < > 4.4      < > 102  --  100   CO2 19*   < > 21  --  23   BUN 12   < > 12  --  12   CREATININE 0.89  --  0.86  --  0.90   GLUCOSE 398*   < > 411*  --  420*    < > = values in this interval not displayed. Magnesium:   Lab Results   Component Value Date    MG 2.0 11/04/2021     Phosphorus:   Lab Results   Component Value Date    PHOS 3.2 11/06/2021     Ionized Calcium: No results found for: CAION   PT/INR:  No results found for: PROTIME, INR  PTT:    Lab Results   Component Value Date    APTT 22.4 07/07/2020       ASSESSMENT   Principal Problem:    DKA, type 2, not at goal [facility])  Active Problems:    Polysubstance abuse ([facility] [address]    Asthma exacerbation attacks    Major depression  Resolved Problems:    Acute kidney injury ([address]    Diabetic ketoacidosis without coma associated with type 2 diabetes mellitus ([facility] [address]    PLAN     Plan taken from colleague, Dr. Precious Pinto note. Adjusted to reflect today's plan.     *DKA insulin type 2 diabetes mellitus with poor insulin compliance-resolved  -Patient is no longer in DKA  -Lantus increased to 30 units twice daily  -Increase to High dose insulin sliding scale  -Glucose checks and hypoglycemia protocol    Right hand pain r/o osteomyelitis  - Patient mentions some pain at site of right index amputation  - Mild tenderness to palpation  - Stiches intact  - Mild serous discharge. WBCs within range. No fever.  - Will get MRI to rule out osteomyelitis     *MARTIN-resolved  -We will discontinue IV fluids     *Fall secondary to weakness  -Benefit from PT/OT  -May require placement     *Asthma not in acute exacerbation  -Symbicort  -Albuterol as needed     *Severe major depression without psychosis  -Continue Effexor 150 mg daily  -Mirtazapine 7.5 mg nightly     *Cocaine use/withdrawal  -May use Ativan for agitation per psych     DVT: Lovenox  GI ppx: Protonix  Code: Full  Diet: Diabetic  Dispo: Planning discharge. May possibly need home healthcare on discharge.     This plan will be discussed with the rounding attending: Javi Mendes MD.      Bernard Love MD   Family Medicine Resident Physician  11/7/2021 9:46 AM

## 2021-11-08 ENCOUNTER — CARE COORDINATION (OUTPATIENT)
Dept: CARE COORDINATION | Age: 56
End: 2021-11-08

## 2021-11-08 VITALS
TEMPERATURE: 97.4 F | BODY MASS INDEX: 36.4 KG/M2 | WEIGHT: 218.48 LBS | HEIGHT: 65 IN | RESPIRATION RATE: 18 BRPM | SYSTOLIC BLOOD PRESSURE: 139 MMHG | HEART RATE: 97 BPM | OXYGEN SATURATION: 98 % | DIASTOLIC BLOOD PRESSURE: 88 MMHG

## 2021-11-08 LAB
ANION GAP SERPL CALCULATED.3IONS-SCNC: 11 MMOL/L (ref 9–17)
BUN BLDV-MCNC: 9 MG/DL (ref 6–20)
BUN/CREAT BLD: ABNORMAL (ref 9–20)
CALCIUM SERPL-MCNC: 8.5 MG/DL (ref 8.6–10.4)
CHLORIDE BLD-SCNC: 106 MMOL/L (ref 98–107)
CO2: 20 MMOL/L (ref 20–31)
CREAT SERPL-MCNC: 0.67 MG/DL (ref 0.5–0.9)
GFR AFRICAN AMERICAN: >60 ML/MIN
GFR NON-AFRICAN AMERICAN: >60 ML/MIN
GFR SERPL CREATININE-BSD FRML MDRD: ABNORMAL ML/MIN/{1.73_M2}
GFR SERPL CREATININE-BSD FRML MDRD: ABNORMAL ML/MIN/{1.73_M2}
GLUCOSE BLD-MCNC: 117 MG/DL (ref 65–105)
GLUCOSE BLD-MCNC: 166 MG/DL (ref 70–99)
GLUCOSE BLD-MCNC: 268 MG/DL (ref 65–105)
GLUCOSE BLD-MCNC: 282 MG/DL (ref 65–105)
HCT VFR BLD CALC: 30.7 % (ref 36.3–47.1)
HEMOGLOBIN: 9.8 G/DL (ref 11.9–15.1)
MCH RBC QN AUTO: 33 PG (ref 25.2–33.5)
MCHC RBC AUTO-ENTMCNC: 31.9 G/DL (ref 28.4–34.8)
MCV RBC AUTO: 103.4 FL (ref 82.6–102.9)
NRBC AUTOMATED: 0.4 PER 100 WBC
PDW BLD-RTO: 13.2 % (ref 11.8–14.4)
PLATELET # BLD: 447 K/UL (ref 138–453)
PMV BLD AUTO: 10.8 FL (ref 8.1–13.5)
POTASSIUM SERPL-SCNC: 4.3 MMOL/L (ref 3.7–5.3)
RBC # BLD: 2.97 M/UL (ref 3.95–5.11)
SODIUM BLD-SCNC: 137 MMOL/L (ref 135–144)
WBC # BLD: 5.3 K/UL (ref 3.5–11.3)

## 2021-11-08 PROCEDURE — 97535 SELF CARE MNGMENT TRAINING: CPT

## 2021-11-08 PROCEDURE — 6370000000 HC RX 637 (ALT 250 FOR IP): Performed by: STUDENT IN AN ORGANIZED HEALTH CARE EDUCATION/TRAINING PROGRAM

## 2021-11-08 PROCEDURE — 97161 PT EVAL LOW COMPLEX 20 MIN: CPT

## 2021-11-08 PROCEDURE — 80048 BASIC METABOLIC PNL TOTAL CA: CPT

## 2021-11-08 PROCEDURE — 97116 GAIT TRAINING THERAPY: CPT

## 2021-11-08 PROCEDURE — 97166 OT EVAL MOD COMPLEX 45 MIN: CPT

## 2021-11-08 PROCEDURE — 85027 COMPLETE CBC AUTOMATED: CPT

## 2021-11-08 PROCEDURE — 82947 ASSAY GLUCOSE BLOOD QUANT: CPT

## 2021-11-08 PROCEDURE — 36415 COLL VENOUS BLD VENIPUNCTURE: CPT

## 2021-11-08 PROCEDURE — 6370000000 HC RX 637 (ALT 250 FOR IP)

## 2021-11-08 PROCEDURE — 6360000002 HC RX W HCPCS: Performed by: STUDENT IN AN ORGANIZED HEALTH CARE EDUCATION/TRAINING PROGRAM

## 2021-11-08 PROCEDURE — 6360000002 HC RX W HCPCS

## 2021-11-08 RX ORDER — GLUCOSAMINE HCL/CHONDROITIN SU 500-400 MG
CAPSULE ORAL
Qty: 100 STRIP | Refills: 0 | Status: SHIPPED | OUTPATIENT
Start: 2021-11-08 | End: 2022-03-15 | Stop reason: SDUPTHER

## 2021-11-08 RX ORDER — MIRTAZAPINE 7.5 MG/1
7.5 TABLET, FILM COATED ORAL NIGHTLY
Qty: 30 TABLET | Refills: 1 | Status: SHIPPED | OUTPATIENT
Start: 2021-11-08 | End: 2021-12-17 | Stop reason: SDUPTHER

## 2021-11-08 RX ORDER — PEN NEEDLE, DIABETIC 31 GX5/16"
1 NEEDLE, DISPOSABLE MISCELLANEOUS DAILY
Qty: 100 EACH | Refills: 3 | Status: SHIPPED | OUTPATIENT
Start: 2021-11-08 | End: 2021-11-08 | Stop reason: SDUPTHER

## 2021-11-08 RX ORDER — INSULIN GLARGINE 100 [IU]/ML
30 INJECTION, SOLUTION SUBCUTANEOUS 2 TIMES DAILY
Qty: 5 PEN | Refills: 1 | Status: SHIPPED | OUTPATIENT
Start: 2021-11-08 | End: 2021-11-15 | Stop reason: SDUPTHER

## 2021-11-08 RX ORDER — DOXYCYCLINE HYCLATE 100 MG
100 TABLET ORAL 2 TIMES DAILY
Qty: 10 TABLET | Refills: 0 | Status: SHIPPED | OUTPATIENT
Start: 2021-11-08 | End: 2021-11-08 | Stop reason: SDUPTHER

## 2021-11-08 RX ORDER — DOXYCYCLINE HYCLATE 100 MG
100 TABLET ORAL 2 TIMES DAILY
Qty: 10 TABLET | Refills: 0 | Status: SHIPPED | OUTPATIENT
Start: 2021-11-08 | End: 2021-11-13

## 2021-11-08 RX ORDER — PEN NEEDLE, DIABETIC 31 GX5/16"
1 NEEDLE, DISPOSABLE MISCELLANEOUS DAILY
Qty: 100 EACH | Refills: 3 | Status: SHIPPED | OUTPATIENT
Start: 2021-11-08 | End: 2022-03-15 | Stop reason: SDUPTHER

## 2021-11-08 RX ORDER — HYDROCODONE BITARTRATE AND ACETAMINOPHEN 5; 325 MG/1; MG/1
1 TABLET ORAL EVERY 8 HOURS PRN
Qty: 20 TABLET | Refills: 0 | Status: SHIPPED | OUTPATIENT
Start: 2021-11-08 | End: 2021-11-15

## 2021-11-08 RX ORDER — LANCETS 30 GAUGE
1 EACH MISCELLANEOUS 3 TIMES DAILY
Qty: 200 EACH | Refills: 0 | Status: SHIPPED | OUTPATIENT
Start: 2021-11-08 | End: 2022-03-15 | Stop reason: SDUPTHER

## 2021-11-08 RX ADMIN — PREGABALIN 75 MG: 75 CAPSULE ORAL at 08:04

## 2021-11-08 RX ADMIN — PANTOPRAZOLE SODIUM 20 MG: 20 TABLET, DELAYED RELEASE ORAL at 16:47

## 2021-11-08 RX ADMIN — MORPHINE SULFATE 2 MG: 4 INJECTION, SOLUTION INTRAMUSCULAR; INTRAVENOUS at 02:04

## 2021-11-08 RX ADMIN — DICYCLOMINE HYDROCHLORIDE 10 MG: 10 CAPSULE ORAL at 08:04

## 2021-11-08 RX ADMIN — DICYCLOMINE HYDROCHLORIDE 10 MG: 10 CAPSULE ORAL at 16:47

## 2021-11-08 RX ADMIN — DICYCLOMINE HYDROCHLORIDE 10 MG: 10 CAPSULE ORAL at 11:53

## 2021-11-08 RX ADMIN — VENLAFAXINE HYDROCHLORIDE 150 MG: 150 CAPSULE, EXTENDED RELEASE ORAL at 08:04

## 2021-11-08 RX ADMIN — MORPHINE SULFATE 2 MG: 4 INJECTION, SOLUTION INTRAMUSCULAR; INTRAVENOUS at 16:14

## 2021-11-08 RX ADMIN — INSULIN GLARGINE 30 UNITS: 100 INJECTION, SOLUTION SUBCUTANEOUS at 08:04

## 2021-11-08 RX ADMIN — MORPHINE SULFATE 2 MG: 4 INJECTION, SOLUTION INTRAMUSCULAR; INTRAVENOUS at 11:53

## 2021-11-08 RX ADMIN — PANTOPRAZOLE SODIUM 20 MG: 20 TABLET, DELAYED RELEASE ORAL at 08:04

## 2021-11-08 RX ADMIN — MORPHINE SULFATE 2 MG: 4 INJECTION, SOLUTION INTRAMUSCULAR; INTRAVENOUS at 08:03

## 2021-11-08 RX ADMIN — INSULIN LISPRO 9 UNITS: 100 INJECTION, SOLUTION INTRAVENOUS; SUBCUTANEOUS at 11:53

## 2021-11-08 RX ADMIN — INSULIN LISPRO 3 UNITS: 100 INJECTION, SOLUTION INTRAVENOUS; SUBCUTANEOUS at 16:47

## 2021-11-08 ASSESSMENT — ENCOUNTER SYMPTOMS
NAUSEA: 0
SHORTNESS OF BREATH: 0
WHEEZING: 0
CONSTIPATION: 0
DIARRHEA: 0
SORE THROAT: 0
VOMITING: 0
COUGH: 0
ABDOMINAL PAIN: 0
RHINORRHEA: 0

## 2021-11-08 ASSESSMENT — PAIN DESCRIPTION - PAIN TYPE: TYPE: ACUTE PAIN

## 2021-11-08 ASSESSMENT — PAIN DESCRIPTION - DESCRIPTORS: DESCRIPTORS: DISCOMFORT

## 2021-11-08 ASSESSMENT — PAIN SCALES - GENERAL
PAINLEVEL_OUTOF10: 8
PAINLEVEL_OUTOF10: 10
PAINLEVEL_OUTOF10: 9
PAINLEVEL_OUTOF10: 10
PAINLEVEL_OUTOF10: 10

## 2021-11-08 ASSESSMENT — PAIN DESCRIPTION - FREQUENCY: FREQUENCY: CONTINUOUS

## 2021-11-08 ASSESSMENT — PAIN DESCRIPTION - LOCATION: LOCATION: GENERALIZED

## 2021-11-08 NOTE — PROGRESS NOTES
[address]    Admission Medication Reconciliation       The patient's list of current home medications has been reviewed. Source(s) of information: dispense history    Based on information provided by the above source(s), I have updated the patient's home med list as described below. Please review the ACTION REQUESTED section of this note below for any discrepancies on current hospital orders. I changed or updated the following medications on the patient's home medication list:  Removed      Added      Adjusted   Metformin last filled on 9/25/21 30 days, check if patient still take  Remeron: not found on dispense history, questionable if patient still take  Lyrica: filled for 15 days supply 9/25/21  Flovent and Symbicort: please clarify if patient needs both  Trazadone consider adding for night time, ordered as 150 mg nightly per dispense history   Other Notes          PROVIDER ACTION REQUESTED  Medications that need to be addressed by a physician/nurse practitioner:    Medication Action Requested                 Please feel free to call me with any questions about this encounter. Thank you.     Thank you  Marco Antonio Alejandro, PharmD, [facility]  Inpatient Clinical Pharmacist  [phone]

## 2021-11-08 NOTE — PROGRESS NOTES
Leena Hamilton was evaluated today and a DME order was entered for a wheeled walker because she requires this to successfully complete daily living tasks of ambulating. A wheeled walker is necessary due to the patient's unsteady gait, upper body weakness, and inability to  an ambulation device; and she can ambulate only by pushing a walker instead of a lesser assistive device such as a cane, crutch, or standard walker. The need for this equipment was discussed with the patient and she understands and is in agreement. Rolling walker order placed as recommended by physical therapy.

## 2021-11-08 NOTE — PROGRESS NOTES
Pt refused telemetry monitoring and all vital signs. RN discussed importance of monitoring however, pt still declined telemetry and vitals.

## 2021-11-08 NOTE — CARE COORDINATION
[facility] Quality Flow/Interdisciplinary Rounds Progress Note    Quality Flow Rounds held on November 8, 2021 at [address] Attending:  Bedside Nurse, ,  and Nursing Unit Leadership    Barriers to Discharge:     Anticipated Discharge Date:       Anticipated Discharge Disposition: Home with [facility]. and DME    Readmission Risk              Risk of Unplanned Readmission:  45           Discussed patient goal for the day, patient clinical progression, and barriers to discharge. The following Goal(s) of the Day/Commitment(s) have been identified:  Referral - Home Care    PT/OT recommend treatment after d/c. [facility]. Order placed, still need DME order and Face-To-Face completed by the Attending. Pt has had [facility] in the past, requested referral to them. Spoke with [facility] Ad[facility], they requested that the referral and Face Sheet be faxed to [phone]. Sent as requested. Spoke with Roman Arthur from [facility] [employer]. Face Sheet, Order and Face-To-Face faxed to [phone] with [address] to inquire about status of referral.  She stated she received it and is forwarding it to Intake and will have them CB    [address] with [address], requesting CB from intake/admissions. Rollator walker delivered by [facility]    1810 Call received from Umang Jennings with [facility]. They are able to accept patient, they will not likely see her until Wednesday. Pt updated. 1815 Pt to go home by [employer] cab. Confirmation # [id number] @ 1900.   Pt updated    Myrtle Khoury RN  November 8, 2021

## 2021-11-08 NOTE — PROGRESS NOTES
Department of Family Medicine  Daily Progress Note  [facility], [facility].    Date:   11/8/2021  Patient name:  Genaro Vences  Date of admission:  11/4/2021  3:03 PM  MRN:   [id number]  YOB: 1965    SUBJECTIVE     Patient was seen and examined at bedside this AM.  Patient complains of some chills overnight. Patient denied any CP, SOB, fever, chills, nausea, vomiting or diarrhea. Drainage from wound has resolved. Review of Systems   Constitutional: Positive for chills. Negative for diaphoresis and fever. HENT: Negative for congestion, rhinorrhea and sore throat. Respiratory: Negative for cough, shortness of breath and wheezing. Cardiovascular: Negative for chest pain, palpitations and leg swelling. Gastrointestinal: Negative for abdominal pain, constipation, diarrhea, nausea and vomiting. Genitourinary: Negative for difficulty urinating, dysuria and frequency. Neurological: Positive for headaches. Negative for dizziness and light-headedness. OBJECTIVE   /78   Pulse 78   Temp 98.3 °F (36.8 °C) (Oral)   Resp 18   Ht 5' 5\" (1.651 m)   Wt 218 lb 7.6 oz (99.1 kg)   LMP  (LMP Unknown)   SpO2 95%   BMI 36.36 kg/m²      Physical Exam  Vitals and nursing note reviewed. Constitutional:       General: She is not in acute distress. Eyes:      Extraocular Movements: Extraocular movements intact. Conjunctiva/sclera: Conjunctivae normal.   Cardiovascular:      Rate and Rhythm: Normal rate and regular rhythm. Pulses: Normal pulses. Heart sounds: Normal heart sounds. Pulmonary:      Effort: Pulmonary effort is normal.      Breath sounds: Normal breath sounds. Abdominal:      General: Bowel sounds are normal. There is no distension. Palpations: Abdomen is soft. Tenderness: There is no abdominal tenderness. There is no guarding. Musculoskeletal:      Right lower leg: No edema. Left lower leg: No edema.       Comments: Right hand amputation looks good, no serosanguineous drainage no open wounds. Neurological:      General: No focal deficit present. Mental Status: She is alert. Intake/Output:    Intake/Output Summary (Last 24 hours) at 11/8/2021 0948  Last data filed at 11/7/2021 1200  Gross per 24 hour   Intake 360 ml   Output --   Net 360 ml       Imaging: MRI right hand  1. Amputation of the 2nd digit at the level of the distal diaphysis of the   2nd metacarpal.  Amputation margin remains sharp with mild edema at the   amputation margin.  No significant postcontrast enhancement of the amputation   margin.  The T1 marrow signal at the amputation margin is preserved. Findings favored to reflect mild reactive osteitis.  Early changes of   osteomyelitis would be difficult to entirely exclude but are felt less likely   in the early postoperative setting. 2. Soft tissue edema at the amputation site demonstrating postcontrast   enhancement suggesting cellulitis/inflammatory change.  No organized   drainable collections are identified. Laboratory Testing:  CBC:   Recent Labs     11/08/21  0605   WBC 5.3   HGB 9.8*        BMP:    Recent Labs     11/06/21  0948 11/06/21  1449 11/07/21  0643 11/07/21  0643 11/08/21  0605   *  --  132*   < > 137   K 4.6   < > 4.4   < > 4.3     --  100   < > 106   CO2 21  --  23   < > 20   BUN 12  --  12   < > 9   CREATININE 0.86  --  0.90  --  0.67   GLUCOSE 411*  --  420*   < > 166*    < > = values in this interval not displayed.      Magnesium:   Lab Results   Component Value Date    MG 2.0 11/04/2021     Phosphorus:   Lab Results   Component Value Date    PHOS 3.2 11/06/2021     Ionized Calcium: No results found for: CAION   PT/INR:  No results found for: PROTIME, INR  PTT:    Lab Results   Component Value Date    APTT 22.4 07/07/2020         ASSESSMENT   Principal Problem:    DKA, type 2, not at goal [facility])  Active Problems:    Polysubstance abuse ([facility] [address]    Asthma exacerbation attacks    Major depression  Resolved Problems:    Acute kidney injury ([facility] [address]    Diabetic ketoacidosis without coma associated with type 2 diabetes mellitus ([facility] [address]      PLAN      DKA in setting of type 2 diabetes mellitus  -DKA is resolved  -Lantus 30 units twice daily  -High-dose insulin sliding scale     Right hand pain, osteomyelitis ruled out  -Amputation site looks clean dry and intact   -Stitches to be removed today  -Possible cellulitis versus reactive changes seen MRI, this is likely reactive     Asthma not in acute exacerbation  -Symbicort, albuterol as needed     Severe major depression with psychosis  -Effexor 150 daily, mirtazapine 7.5     Cocaine withdrawal-improved     MARTIN-resolved    DVT: Lovenox  GI ppx: Protonix  Code: Full  Diet: Diabetic  Dispo: Discharge today    This plan will be discussed with the rounding attending: Beth Pruitt MD.      Ce Kay MD   Family Medicine Resident Physician  11/8/2021 9:48 AM

## 2021-11-08 NOTE — DISCHARGE INSTR - COC
Continuity of Care Form    Patient Name: Ethan Calderon   :  1965  MRN:  [id number]    Admit date:  2021  Discharge date:  21    Code Status Order: Full Code   Advance Directives:      Admitting Physician:  Rosio Nieves MD  PCP: Elias Apple MD    Discharging Nurse: [facility] Unit/Room#: 0878/7083-71  Discharging Unit Phone Number: [phone]    Emergency Contact:   No emergency contact information on file.     Past Surgical History:  Past Surgical History:   Procedure Laterality Date    ABDOMEN SURGERY      drain tube    ABSCESS DRAINAGE      right buttock    CATARACT REMOVAL WITH IMPLANT Bilateral     CHEST TUBE INSERTION      FINGER AMPUTATION Left 2021    LEFT RING FINER AMPUTATION performed by Danelle Batres MD at [address] Right 10/11/2021    AMPUTATION RIGHT INDEX FINGER    FINGER AMPUTATION Right 10/11/2021    AMPUTATION RIGHT INDEX FINGER performed by Danelle Batres MD at [address] [place] [address] Right 2021    I&D INDEX FINGER performed by Danelle Batres MD at [address] [place] [address] Right 09/15/2021    I&D INDEX FINGER performed by Danelle Batres MD at [address] [facility] Bilateral        Immunization History:   Immunization History   Administered Date(s) Administered    COVID-19, Pfizer, PF, 30mcg/0.3mL 10/05/2021    Tdap (Boostrix, Adacel) 2017, 2020, 2021       Active Problems:  Patient Active Problem List   Diagnosis Code    IDDM (insulin dependent diabetes mellitus) [id number]    Lupus ([facility] M32.9    Post traumatic stress disorder (PTSD) F43.10    Acute cystitis with hematuria N30.01    Hyperglycemia R73.9    Suicidal ideation R45.851    Arthritis M19.90    Chronic back pain M54.9, G89.29    Acid reflux K21.9    History of stroke Z86.73    Polysubstance abuse ([facility] F19.10    Cocaine abuse ([facility] F14.10    Chest pain R07.9    Acute non-recurrent maxillary sinusitis J01.00    Acute respiratory failure with hypercapnia ([facility]) J96.02    Alcohol use disorder, severe, dependence ([facility]) F10.20    Asthma exacerbation attacks J45.901    Bilateral edema of lower extremity R60.0    Bipolar 1 disorder, depressed, severe ([facility]) F31.4    BMI 34.0-34.9,adult Z68.34    Cannabis use disorder, severe, dependence ([facility]) F12.20    Cocaine use disorder, severe, dependence ([facility]) F14.20    Dizziness R42    Dyslipidemia E78.5    Family history of colon cancer Z80.0    History of lupus [id number]    Homicidal ideation R45.850    Hypotension I95.9    Neuropathy G62.9    Normocytic anemia D64.9    Recurrent depression ([facility]) F33.9    Recurrent major depressive disorder, in partial remission ([facility]) F33.41    Severe recurrent major depression with psychotic features ([facility]) F33.3    Severe recurrent major depression without psychotic features ([facility] F33.2    Upper GI bleed K92.2    Vitamin D deficiency E55.9    White matter changes [id number]    Gastroesophageal reflux disease K21.9    Stroke (cerebrum) ([facility]) I63.9    Rib lesion M89.9    Diabetes mellitus type 2 in obese ([facility]) E11.69, E66.9    YAEL (generalized anxiety disorder) F41.1    Posttraumatic stress disorder F43.10    Suicidal intent R45.851    Leg weakness, bilateral R29.898    Poorly controlled diabetes mellitus ([facility] E11.65    Felon of finger L03.019    Osteomyelitis ([facility]) M86.9    Recurrent falls R29.6    Seasonal allergic rhinitis J30.2    Fibromyalgia M79.7    Tenosynovitis of finger M65.9    Open wound of right index finger S61.200A    Adverse effect of COVID-19 vaccine T50. B95A    Wound dehiscence T81.30XA    Amputation finger S68.119A    Abscess of right index finger L02.511    DKA, type 2, not at goal [facility]) E11.10    Major depression F32.9       Isolation/Infection:   Isolation            Contact          Patient Infection Status       Infection Onset Added Last Indicated Last Indicated By Review Planned Expiration Resolved Resolved By    [facility] 02/24/21 02/27/21 02/24/21 Culture, Anaerobic and Aerobic        Finger 2/2021    Resolved    COVID-19 Rule Out 03/13/21 03/13/21 03/13/21 COVID-19, Rapid (Ordered)   03/13/21 Rule-Out Test Resulted            Nurse Assessment:  Last Vital Signs: /78   Pulse 78   Temp 98.3 °F (36.8 °C) (Oral)   Resp 18   Ht 5' 5\" (1.651 m)   Wt 218 lb 7.6 oz (99.1 kg)   LMP  (LMP Unknown)   SpO2 95%   BMI 36.36 kg/m²     Last documented pain score (0-10 scale): Pain Level: 9  Last Weight:   Wt Readings from Last 1 Encounters:   11/07/21 218 lb 7.6 oz (99.1 kg)     Mental Status:  {IP PT MENTAL STATUS:20030}    IV Access:  { MINDA IV ACCESS:123091114}    Nursing Mobility/ADLs:  Walking   Independent  Transfer  Independent  Bathing  Independent  Dressing  Independent  Toileting  Independent  Feeding  [address] [facility] Delivery   whole    Wound Care Documentation and Therapy:        Elimination:  Continence: Bowel: Yes  Bladder: Yes  Urinary Catheter: None   Colostomy/Ileostomy/Ileal Conduit: No       Date of Last BM: 11/6/21  No intake or output data in the 24 hours ending 11/08/21 1221  I/O last 3 completed shifts: In: 5 [P.O.:720]  Out: -     Safety Concerns:     None    Impairments/Disabilities:      None    Nutrition Therapy:  Current Nutrition Therapy:   - Oral Diet:  Carb Control 4 carbs/meal (1800kcals/day)    Routes of Feeding: Oral  Liquids: No Restrictions  Daily Fluid Restriction: no  Last Modified Barium Swallow with Video (Video Swallowing Test): not done    Treatments at the Time of Hospital Discharge:   Respiratory Treatments: n/a  Oxygen Therapy:  is not on home oxygen therapy.   Ventilator:    - No ventilator support    Rehab Therapies: Physical Therapy and Occupational Therapy  Weight Bearing Status/Restrictions: No weight bearing restirctions  Other Medical Equipment (for information only, NOT a DME order):  walker  Other Treatments: Skilled Nursing Assessment    Patient's personal belongings (please select all

## 2021-11-08 NOTE — PROGRESS NOTES
Patient Diagnosis(es): The primary encounter diagnosis was Diabetic ketoacidosis without coma associated with type 2 diabetes mellitus ([address] Diagnoses of Type 2 diabetes mellitus without complication, without long-term current use of insulin ([facility]) and Diabetes mellitus type 2 in obese [facility]) were also pertinent to this visit. has a past medical history of Acid reflux, Acute cystitis with hematuria, Acute non-recurrent maxillary sinusitis, Asthma, Bipolar 1 disorder ([address] Bipolar disorder, mixed ([address] BMI 34.0-34.9,adult, Cannabis use disorder, severe, dependence ([address] Cerebrovascular accident (CVA) ([address] Chest pain, Chronic renal insufficiency, Cocaine abuse ([address] COVID-19 virus RNA test result unknown, DDD (degenerative disc disease), cervical, Diabetes mellitus ([address] Dizziness, Fibromyalgia, History of stroke, Homicidal ideation, Hyperglycemia, Hypertension, Hypotension, IDDM (insulin dependent diabetes mellitus), Lupus ([address] Migraine, Neuropathy, Neuropathy, Polysubstance abuse ([address] Post traumatic stress disorder (PTSD), Posttraumatic stress disorder, Recurrent depression ([address] Recurrent major depressive disorder, in partial remission ([address] Screening mammogram, encounter for, Severe recurrent major depression with psychotic features ([address] Severe recurrent major depression without psychotic features ([address] Stroke (cerebrum) ([address] Stroke ([address] Suicidal ideation, Suicidal intent, Vitamin D deficiency, and White matter changes. has a past surgical history that includes Abscess Drainage; chest tube insertion; Abdomen surgery; Cataract removal with implant (Bilateral); LASIK (Bilateral); Finger amputation (Left, 03/13/2021); Hand surgery (Right, 09/14/2021); Hand surgery (Right, 09/15/2021); Finger amputation (Right, 10/11/2021); and Finger amputation (Right, 10/11/2021).            Restrictions  Restrictions/Precautions  Restrictions/Precautions: Up as Tolerated  Required Braces or Orthoses?: No  Position Activity Restriction  Other position/activity restrictions: Hx of AMPUTATION RIGHT INDEX FINGER (10/11/21)    Subjective   General  Patient assessed for rehabilitation services?: Yes  Family / Caregiver Present: No  General Comment  Comments: RN ok'd for OT/PT Eval this AM. Pt agreeable to session, pleasent/cooperative throughout. Patient Currently in Pain: Yes  Vital Signs  Patient Currently in Pain: Yes     Social/Functional History  Social/Functional History  Lives With: Alone  Type of Home: House  Home Layout: One level  Home Access: Level entry  Bathroom Shower/Tub: Tub/Shower unit  Bathroom Toilet: Standard  Home Equipment: 4 wheeled walker (pt reports current 4WW \"handles are broken\")  ADL Assistance: Independent  Homemaking Assistance: Independent  Homemaking Responsibilities: Yes  Ambulation Assistance: Independent (pt independently ambulates with no AD at baseline)  Transfer Assistance: Independent  Active : No  Mode of Transportation: Cab, Friends  Occupation: Unemployed       Objective   Vision: Impaired  Vision Exceptions: Wears glasses at all times  Hearing: Within functional limits    Orientation  Overall Orientation Status: Within Functional Limits     Balance  Sitting Balance: Supervision (Seated unsupported for toileting tasks and EOB for ROM/MMT ~11 minutes total)  Standing Balance: Contact guard assistance  Standing Balance  Time: 8 min  Activity: static standing EOB, standing at sinkside for ADLs, toileting tasks  Comment: No LOB    Functional Mobility  Functional - Mobility Device: Rolling Walker  Activity: To/from bathroom  Assist Level: Contact guard assistance  Functional Mobility Comments: Pt completed functional mobility to/from bathroom with RW. Pt reports she does not use AD at baseline and completed mobility in montero for household distances without AD. Pt became fatigued and utilized wall and handle in hallway for support.  Declined to use RW again despite encouragement. Minor LOB into wall w/ self correction by holding onto wall. Toilet Transfers  Toilet - Technique: Ambulating  Equipment Used: Standard toilet  Toilet Transfer: Contact guard assistance    ADL  Feeding: Independent  Grooming: Modified independent ; Setup (Stood sinkside to complete oral hygiene and hand hygiene)  UE Bathing: Stand by assistance; Setup  LE Bathing: Contact guard assistance; Setup  UE Dressing: Modified independent   LE Dressing: Contact guard assistance  Toileting: Contact guard assistance (Pt required CGA to stand for pericare and clothing management d/t decreased safety awareness)    Tone RUE  RUE Tone: Normotonic  Tone LUE  LUE Tone: Normotonic    Coordination  Movements Are Fluid And Coordinated: No  Coordination and Movement description: Fine motor impairments; Right UE; Decreased speed; Decreased accuracy  Quality of Movement Other  Comment: Decreased FMC to R hand d/t R index finger amputation     Bed mobility  Supine to Sit: Stand by assistance  Sit to Supine: Unable to assess (Pt sitting EOB per request, RN notified)  Comment: HOB elevated ~40 degrees     Transfers  Sit to stand: Contact guard assistance  Stand to sit: Contact guard assistance  Transfer Comments: Use of RW     Cognition  Overall Cognitive Status: Exceptions  Arousal/Alertness: Appropriate responses to stimuli  Following Commands: Follows multistep commands with repitition  Attention Span: Appears intact  Memory: Appears intact  Safety Judgement: Decreased awareness of need for safety  Insights: Decreased awareness of deficits  Cognition Comment: Min VCs throughout for safety awareness.         Sensation  Overall Sensation Status: Impaired (pt reports chronic numbness of bilateral LE)        LUE AROM (degrees)  LUE AROM : WFL  Left Hand AROM (degrees)  Left Hand AROM: WFL  RUE AROM (degrees)  RUE AROM : WFL  Right Hand AROM (degrees)  Right Hand AROM: WFL  LUE Strength  Gross LUE Strength: WFL  L Hand General: 4+/5  LUE Strength Comment: Grossly 4+/5  RUE Strength  Gross RUE Strength: WFL  R Hand General: 4+/5  RUE Strength Comment: Grossly 4+/5                   Plan   Plan  Times per week: 2-4x/wk  Current Treatment Recommendations: Functional Mobility Training, Endurance Training, Safety Education & Training, Self-Care / ADL, Patient/Caregiver Education & Training, Home Management Training, Balance Training      AM-PAC Score        AM-PAC Inpatient Daily Activity Raw Score: 21 (11/08/21 1610)  AM-PAC Inpatient ADL T-Scale Score : 44.27 (11/08/21 1610)  ADL Inpatient CMS 0-100% Score: 32.79 (11/08/21 1610)  ADL Inpatient CMS G-Code Modifier : Shamika Pickup (11/08/21 1610)    Goals  Short term goals  Time Frame for Short term goals: By discharge, pt will:  Short term goal 1: Demo bed mobility with Mod IND, HOB flat to mimic home setup  Short term goal 2: Demo functional sit<>stand transfers with Mod IND, using LRD PRN  Short term goal 3: Demo functional mobility with SUP, using LRD PRN and 0 VCs for safety  Short term goal 4: Demo +5 mintues of dynamic reaching in multiple planes while standing with SBA to promote increased engagement in ADLs  Short term goal 5: Demo LB ADLs with Mod IND, using DME PRN  Short term goal 6: Demo good safety awareness IND throughout all functional tasks with 0 VCs       Therapy Time   Individual Concurrent Group Co-treatment   Time In 5490         Time Out 1111         Minutes 24         Timed Code Treatment Minutes: 9 Minutes       ALEXANDRA Almonte/L

## 2021-11-08 NOTE — CARE COORDINATION
Initial Contact Social Work Note - Ambulatory  11/8/2021        Identified Needs:   Medical transportation  [facility] resources       Social Work Plan:   [facility] received message from [address] [place] [address]  unable to reach patient   HC attempted to reach patient, no answer, left a message. HC noticed   that the patient is inpatient      Next Steps: [facility] will watch for patient's discharge        Goals Addressed                 [address] Resource Goal   Improving     Patient states that she needs medical transportation and also has food insecurities. Barriers: fear of failure, lack of motivation, financial, lack of support, overwhelmed by complexity of regimen, stress, time constraints, medication side effects, and lack of education    Plan for overcoming my barriers: [facility] will assist patient with her medical transportation as needed  [facility] will refer patient to [address] [place] [address]  for assistance with getting addition foods in her home.     Confidence: 9/10    Anticipated Goal Completion Date: 08/30/21

## 2021-11-08 NOTE — DISCHARGE SUMMARY
Department of [address] [place] [address], [facility].    Discharge Summary      NAME:  Andie Juarez  :  1965  MRN:  [id number]    Admit date:  2021  Discharge date:   2021    Admitting Physician:  Merrick Phalen, MD    Primary Diagnosis on Admission:   Present on Admission:   DKA, type 2, not at goal [facility])   (Resolved) Acute kidney injury ([facility]   Polysubstance abuse ([facility]   Asthma exacerbation attacks   (Resolved) Diabetic ketoacidosis without coma associated with type 2 diabetes mellitus ([facility]   Major depression      Secondary Diagnoses:  does not have any pertinent problems on file. Admission Condition:  poor     Discharged Condition: good    Hospital Course:    Ms. Madelaine Pollard, 49year old female, with past medical history of DM Type II, HTN, CVA, polysubstance abuse, and bipolar disorder. Presented to the ED at [facility] on  after being found on the floor on . In the ED patient was noted to have an elevated point-of-care glucose and in mild DKA based on elevated beta hydroxybutyrate and an anion gap of 19. Urinalysis was positive for ketones and glucosuria. The patient did also have elevated creatinine and CK in the ED. The patient was admitted for the management of DKA and rule out rhabdomyolysis secondary to fall. DKA protocol started. During first day of admission, there was a time during her transition from the ED to the floor when the insulin drip may have turned off and the patient went back into DKA. Insulin drip was resumed and DKA was resolved. The patient was also restless during her admission for which psych was consulted and started on mirtazapine 7.5 mg nightly. Of note, the patient did have a positive cocaine screen in her UDS. Possible restlessness was due to cocaine withdrawal.    CK was elevated at 245 and myoglobin was normal at 52. Rhabdomyolysis ruled out. Patient was on IV fluids.   MARTIN resolved. Patient did have episodes of hypoglycemia during hospitalization which was adequately controlled with 30 units of Lantus twice daily. Patient's home meds adjusted to 30 units of Lantus twice daily. Today on day of discharge pt feels better with no further complaints. Vitals and Labs are at pts baseline. All consultants involved during this admission are agreeable to d/c. Consults: Psychiatry    Significant Diagnostic/theraputic interventions: None    Disposition:   Home    Instructions to Patient:    Take medications as prescribed. Follow up with your PCP in 1 weeks. Return to the ED if symptoms worsen or new concerning symptoms appear. Follow up with Anastasiia Harrison MD in  1 week    Discharge Medications:       Medication List        START taking these medications      doxycycline hyclate 100 MG tablet  Commonly known as: VIBRA-TABS  Take 1 tablet by mouth 2 times daily for 5 days     HYDROcodone-acetaminophen 5-325 MG per tablet  Commonly known as: Norco  Take 1 tablet by mouth every 8 hours as needed for Pain for up to 20 doses. Intended supply: 3 days. Take lowest dose possible to manage pain     mirtazapine 7.5 MG tablet  Commonly known as: REMERON  Take 1 tablet by mouth nightly            CHANGE how you take these medications      * FREESTYLE LITE strip  Generic drug: blood glucose test strips  1 each by Does not apply route 3 times daily  What changed: Another medication with the same name was added. Make sure you understand how and when to take each. * blood glucose test strips  Test 3 times a day & as needed for symptoms of irregular blood glucose. Dispense sufficient amount for indicated testing frequency plus additional to accommodate PRN testing needs. What changed: You were already taking a medication with the same name, and this prescription was added. Make sure you understand how and when to take each.      Lantus SoloStar 100 UNIT/ML injection pen  Generic drug: insulin glargine  Inject 30 Units into the skin 2 times daily  What changed: how much to take     pregabalin 75 MG capsule  Commonly known as: LYRICA  Take 1 capsule by mouth 2 times daily for 30 days. What changed: how much to take     traZODone 150 MG tablet  Commonly known as: DESYREL  Take 1 tablet by mouth nightly  What changed: how much to take           * This list has 2 medication(s) that are the same as other medications prescribed for you. Read the directions carefully, and ask your doctor or other care provider to review them with you.                 CONTINUE taking these medications      albuterol sulfate  (90 Base) MCG/ACT inhaler  Inhale 2 puffs into the lungs every 4 hours as needed for Wheezing     budesonide-formoterol 80-4.5 MCG/ACT Aero  Commonly known as: Symbicort  Inhale 2 puffs into the lungs 2 times daily     dicyclomine 10 MG capsule  Commonly known as: Bentyl  Take 1 capsule by mouth 4 times daily     Flovent  MCG/ACT inhaler  Generic drug: fluticasone  Inhale 2 puffs into the lungs 2 times daily     [employer] Pen [place] 31G 31G X 8 MM Misc  Generic drug: Insulin Pen Needle  1 each by Does not apply route daily     Lancets Misc  1 each by Does not apply route 3 times daily     metFORMIN 1000 MG tablet  Commonly known as: GLUCOPHAGE  TAKE 1 TABLET BY MOUTH 2 TIMES DAILY (WITH MEALS)     pantoprazole 20 MG tablet  Commonly known as: PROTONIX  Take 1 tablet by mouth 2 times daily (before meals)     venlafaxine 150 MG extended release capsule  Commonly known as: EFFEXOR XR  Take 1 capsule by mouth daily (with breakfast)            STOP taking these medications      cetirizine 10 MG tablet  Commonly known as: ZYRTEC               Where to Get Your Medications        These medications were sent to [address] [place] [address] [address] [address]      Phone: [phone]   blood glucose test strips  doxycycline hyclate 100 MG tablet  [employer] Pen Needles 31G 31G X 8 MM Misc  Lancets Misc  Lantus SoloStar 100 UNIT/ML injection pen  mirtazapine 7.5 MG tablet       You can get these medications from any pharmacy    Bring a paper prescription for each of these medications  HYDROcodone-acetaminophen 5-325 MG per tablet       Send Copies to: Magda Leblanc MD,    Note that over 30 minutes was spent in preparing discharge papers, discussing discharge with patient and family, medication review, etc.    Signed:   Daniel Jimenez MD Family Medicine Resident  11/9/2021, 8:05 AM

## 2021-11-08 NOTE — CARE COORDINATION
Spoke with Andreea Velasco in [address] [place] [address]. Andreea Velasco states that she was able to provide many needed household items to pt in June. She provided a couch, towels, shower curtain, paper towels, toilet paper and numerous cleaning supplies to patients home. Pt was linked with [facility] in the past for depression. Will follow and meet with pt closer to discharge to ensure she has scheduled appointment with [facility] at discharge if discharged to home.

## 2021-11-08 NOTE — PROGRESS NOTES
Pt refused telemetry monitoring and all vital signs. RN discussed importance of monitoring however, pt still declined tele and vitals.

## 2021-11-08 NOTE — PROGRESS NOTES
Physical Therapy    Facility/Department: [facility], [place] 4B STEPDOWN  Initial Assessment    NAME: Danny Pelayo  : 1965  MRN: [id number]    Date of Service: 2021  Chief Complaint   Patient presents with    Hyperglycemia       Discharge Recommendations:    Further therapy recommended at discharge. PT Equipment Recommendations  Equipment Needed: Yes  Mobility Devices: Zonia Dank: Rollator (4 Wheeled)    Assessment   Body structures, Functions, Activity limitations: Decreased functional mobility ; Decreased posture; Decreased endurance; Decreased balance; Increased pain  Assessment: Pt ambulated 20ft w/ RW, 210ft with no AD CGA, pt reporting fatigue with mobility this date. Pt demonstrates high level balance deficits, however, declines use of AD to increase gait stability with ambulation at this time. Recommending continued skilled physical therapy to decrease fall risk and address balance and endurance deficits to return pt to prior level of independence. Prognosis: Good  Decision Making: Medium Complexity  PT Education: Goals; PT Role; Plan of Care; Equipment  REQUIRES PT FOLLOW UP: Yes  Activity Tolerance  Activity Tolerance: Patient Tolerated treatment well       Patient Diagnosis(es): The encounter diagnosis was Diabetic ketoacidosis without coma associated with type 2 diabetes mellitus ([address]      has a past medical history of Acid reflux, Acute cystitis with hematuria, Acute non-recurrent maxillary sinusitis, Asthma, Bipolar 1 disorder ([address] Bipolar disorder, mixed ([address] BMI 34.0-34.9,adult, Cannabis use disorder, severe, dependence ([address] Cerebrovascular accident (CVA) ([address] Chest pain, Chronic renal insufficiency, Cocaine abuse ([address] COVID-19 virus RNA test result unknown, DDD (degenerative disc disease), cervical, Diabetes mellitus ([address] Dizziness, Fibromyalgia, History of stroke, Homicidal ideation, Hyperglycemia, Hypertension, Hypotension, IDDM (insulin dependent diabetes mellitus), Lupus ([facility] Migraine, Neuropathy, Neuropathy, Polysubstance abuse ([facility] Post traumatic stress disorder (PTSD), Posttraumatic stress disorder, Recurrent depression ([facility] Recurrent major depressive disorder, in partial remission ([facility] Screening mammogram, encounter for, Severe recurrent major depression with psychotic features ([address] Severe recurrent major depression without psychotic features ([address] Stroke (cerebrum) ([facility] Stroke ([facility] Suicidal ideation, Suicidal intent, Vitamin D deficiency, and White matter changes. has a past surgical history that includes Abscess Drainage; chest tube insertion; Abdomen surgery; Cataract removal with implant (Bilateral); LASIK (Bilateral); Finger amputation (Left, 03/13/2021); Hand surgery (Right, 09/14/2021); Hand surgery (Right, 09/15/2021); Finger amputation (Right, 10/11/2021); and Finger amputation (Right, 10/11/2021).     Restrictions  Restrictions/Precautions  Restrictions/Precautions: Up as Tolerated  Required Braces or Orthoses?: No  Position Activity Restriction  Other position/activity restrictions: Hx of AMPUTATION RIGHT INDEX FINGER (10/11/21)  Vision/Hearing  Vision: Impaired  Vision Exceptions: Wears glasses at all times  Hearing: Within functional limits     Subjective  General  Patient assessed for rehabilitation services?: Yes  Response To Previous Treatment: Not applicable  Family / Caregiver Present: No  Follows Commands: Within Functional Limits  Subjective  Subjective: RN and pt in agreement for PT eval; pt supine in bed upon PT arrival, pt cooperative throughout session  Pain Screening  Patient Currently in Pain: Yes  Pain Assessment  Pain Assessment: 0-10  Pain Level: 10  Pain Type: Acute pain  Pain Location: Generalized  Pain Descriptors: Discomfort  Pain Frequency: Continuous  Non-Pharmaceutical Pain Intervention(s): Ambulation/Increased Activity; Repositioned  Response to Pain Intervention: Patient Satisfied  Multiple Pain Sites: No  Vital Signs  Patient Currently in Pain: Yes       Orientation  Orientation  Overall Orientation Status: Within Functional Limits  Social/Functional History  Social/Functional History  Lives With: Alone  Type of Home: House  Home Layout: One level  Home Access: Level entry  Bathroom Shower/Tub: Tub/Shower unit  Bathroom Toilet: Standard  Home Equipment: 4 wheeled walker (pt reports current 4WW \"handles are broken\")  ADL Assistance: Independent  Homemaking Assistance: Independent  Homemaking Responsibilities: Yes  Ambulation Assistance: Independent (pt independently ambulates with no AD at baseline)  Transfer Assistance: Independent  Active : No  Mode of Transportation: Cab, Friends  Occupation: Unemployed  Cognition   Cognition  Overall Cognitive Status: Exceptions  Safety Judgement: Decreased awareness of need for safety  Insights: Decreased awareness of deficits    Objective  Joint Mobility  Spine: WFL  ROM RLE: WFL  ROM LLE: WFL  ROM RUE: WFL  ROM LUE: WFL  Strength LLE  Strength LLE: WFL  Strength RUE  Strength RUE: WFL  Strength LUE  Strength LUE: WFL     Sensation  Overall Sensation Status: Impaired (pt reports chronic numbness of bilateral LE)  Bed mobility  Supine to Sit: Stand by assistance  Sit to Supine:  (Did not assess- pt retired seated EOB upon writer's exit)  Comment: Increased time required to complete; pt reports continous dizziness with performance of bed mobility  Transfers  Sit to Stand: Stand by assistance  Stand to sit: Stand by assistance  Comment: Min verbal cues required safety awareness with good return demo  Ambulation  Ambulation?: Yes  More Ambulation?: Yes  Ambulation 1  Surface: level tile  Device: Rolling Walker  Assistance: Stand by assistance  Gait Deviations: Slow Sonia  Distance: 20ft  Ambulation 2  Surface - 2: level tile  Device 2: No device  Assistance 2: Contact guard assistance  Quality of Gait 2:  Increased lateral sway; increased lateral sway; bilateral toeing out  Gait Deviations: Slow Sonia  Distance: 210ft  Comments: Pt utililzes hand rail within hallway to steady gait despite education and encouragement to utilize AD to increase gait stability. One LOB noted independently corrected per patient  Stairs/Curb  Stairs?: No     Balance  Posture: Good  Sitting - Static: Good  Sitting - Dynamic: Good; -  Standing - Static: Good; -  Standing - Dynamic: Fair; +  Comments: standing balance assessed w/ RW; pt able to sit EOB independently        Plan   Plan  Times per week: 4-5x/week  Current Treatment Recommendations: Strengthening, Gait Training, ROM, Stair training, Equipment Evaluation, Education, & procurement, Balance Training, Endurance Training, Transfer Training, Safety Education & Training  Safety Devices  Type of devices: Left in bed, Call light within reach, Gait belt, Nurse notified  Restraints  Initially in place: No    AM-PAC Score  AM-PAC Inpatient Mobility Raw Score : 21 (11/08/21 1203)  AM-PAC Inpatient T-Scale Score : 50.25 (11/08/21 1203)  Mobility Inpatient CMS 0-100% Score: 28.97 (11/08/21 1203)  Mobility Inpatient CMS G-Code Modifier : Lamar Lu (11/08/21 1203)  AM-PAC Inpatient Mobility without Stair Climbing Raw Score : 19 (11/08/21 1203)  AM-PAC Inpatient without Stair Climbing T-Scale Score : 56.04 (11/08/21 1203)  Mobility Inpatient CMS 0-100% Score: 12.25 (11/08/21 1203)  Mobility Inpatient without Stair CMS G-Code Modifier : CI (11/08/21 1203)       Goals  Short term goals  Time Frame for Short term goals: 10  Short term goal 1: Pt to perform bed mobility and functional transfers independently  Short term goal 2: Pt to ambulate 300ft w/ no AD independently demonstrating good safety awareness throughou  Short term goal 3: Demonstrate standing dynamic balance of good - to decrease fall risk  Short term goal 4: Tolerate 30 minutes of therapy to demo increased endurance  Patient Goals   Patient goals :  To go home       Therapy Time   Individual Concurrent Group Co-treatment   Time In 1141         Time Out 1111         Minutes 24         Timed Code Treatment Minutes: 8 Minutes       Bernard Ace, PT

## 2021-11-08 NOTE — PLAN OF CARE
Problem: Falls - Risk of:  Goal: Will remain free from falls  Description: Will remain free from falls  11/8/2021 1552 by Rachel Lieberman RN  Outcome: Met This Shift     Problem: Falls - Risk of:  Goal: Absence of physical injury  Description: Absence of physical injury  11/8/2021 1552 by Rachel Lieberman RN  Outcome: Met This Shift     Problem: Skin Integrity:  Goal: Will show no infection signs and symptoms  Description: Will show no infection signs and symptoms  11/8/2021 1552 by Rachel Lieberman RN  Outcome: Met This Shift     Problem: Skin Integrity:  Goal: Absence of new skin breakdown  Description: Absence of new skin breakdown  11/8/2021 1552 by Rachel Lieberman RN  Outcome: Met This Shift     Problem: Pain:  Goal: Pain level will decrease  Description: Pain level will decrease  11/8/2021 1552 by Rachel Lieberman RN  Outcome: Met This Shift     Problem: Pain:  Goal: Control of acute pain  Description: Control of acute pain  11/8/2021 1552 by Rachel Lieberman RN  Outcome: Met This Shift     Problem: Pain:  Goal: Control of chronic pain  Description: Control of chronic pain  11/8/2021 1552 by Rachel Lieberman RN  Outcome: Met This Shift

## 2021-11-08 NOTE — CARE COORDINATION
Discharge 1 [facility] Case Management Department  Written by: Betty Massey RN    Patient Name: Conchis Pablito  Attending Provider: Juan House MD  Admit Date: 2021  3:03 PM  MRN: [id number]  Account: [de-identified]                     : 1965  Discharge Date: 2021      Disposition: home per [employer] (scheduled for 1900). Home with [facility] for PT/OT.   Home with Rollator per [facility]    Betty Massey RN

## 2021-11-09 ENCOUNTER — TELEPHONE (OUTPATIENT)
Dept: FAMILY MEDICINE CLINIC | Age: 56
End: 2021-11-09

## 2021-11-10 ENCOUNTER — CARE COORDINATION (OUTPATIENT)
Dept: CARE COORDINATION | Age: 56
End: 2021-11-10

## 2021-11-10 NOTE — TELEPHONE ENCOUNTER
[address] Transitions Initial Follow Up Call     Outreach made within 2 business days of discharge: Yes     Patient: Jose Juan Arndt        Patient : 1965   MRN: [id number]           Reason for Admission: DKA type 2  Discharge Date: 2021                    Spoke with: Andrey Burton     Discharge department/facility: [address] Stepdown     TCM Interactive Patient Contact:  Was patient able to fill all prescriptions: Yes  Was patient instructed to bring all medications to the follow-up visit: Yes  Is patient taking all medications as directed in the discharge summary? Yes  Does patient understand their discharge instructions: Yes  Does patient have questions or concerns that need addressed prior to 7-14 day follow up office visit: no    Scheduled appointment with PCP within 7-14 days    Patient asked if writer would reach out to Ange, who is working with pt, to see if transportation can be arranged for hospital f/u appt this . Pt states if she has transportation she is happy to come. Writer to reach out to Ange to see how we can assist. Spoke with Ange, she is able to assist pt with scheduling transportation but appt needed to be moved to 11/15 to allow for ample time to schedule transportation (48 hour notice very strict). Jacque to call pt in morning with transportation details.     Follow Up  21 @1000    Xenia Rhodes RN

## 2021-11-10 NOTE — CARE COORDINATION
Initial Contact Social Work Note - Ambulatory  11/10/2021        Identified Needs:   Medical transportation  [facility] resources       Social Work Plan:   [facility]. phoned patient to follow up with her well being   [facility] inquired about patient's need for food resources, and phoned Pati to request food for patient   Mary Chappell Next Steps: [facility] will follow up with patient to verify her food delivery          Goals Addressed                 [address] Resource Goal   Improving     Patient states that she needs medical transportation and also has food insecurities. Barriers: fear of failure, lack of motivation, financial, lack of support, overwhelmed by complexity of regimen, stress, time constraints, medication side effects, and lack of education    Plan for overcoming my barriers: [facility] will assist patient with her medical transportation as needed  [facility] will refer patient to [address] [place] [address] for assistance with getting addition foods in her home.     Confidence: 9/10    Anticipated Goal Completion Date: 08/30/21

## 2021-11-11 ENCOUNTER — CARE COORDINATION (OUTPATIENT)
Dept: CARE COORDINATION | Age: 56
End: 2021-11-11

## 2021-11-11 NOTE — CARE COORDINATION
Ambulatory Care Coordination Note  11/11/2021  CM Risk Score: 2  [place] 10 Year Mortality Risk Score: 79%     ACC: Anna Dafnes    Summary Note: Amaury Chavez reports she did smoke some \"weed\" with her friends the day before she was admitted, 11.3.21. She states \"they might have had some cocaine in the weed\". She states she will not smoke weed with them any more. She did report she has no desire to use cocaine. She has not been checking her blood sugars. She has an appointment with her PCP on 11.15.21 at 10 AM.  Plan:  Meet Amaury Chavez at her appointment next week. Care Coordination Interventions    Program Enrollment: Complex Care  Referral from Primary Care Provider: No  Suggested Interventions and Community Resources  Diabetes Education: Not Started  Grief Counselor: Not Started  [address]: Completed (Comment: [address])  Medi Set or Pill Pack: Not Started  Physical Therapy: Not Started  Registered Dietician: Not Started  Social Work: Completed  Transportation Support: In Process  Zone Management Tools: Completed (Comment: DM)         Goals Addressed    None         Prior to Admission medications    Medication Sig Start Date End Date Taking? Authorizing Provider   insulin glargine (LANTUS SOLOSTAR) 100 UNIT/ML injection pen Inject 30 Units into the skin 2 times daily 11/8/21   Jodi Malloy MD   mirtazapine (REMERON) 7.5 MG tablet Take 1 tablet by mouth nightly 11/8/21   Jodi Malloy MD   blood glucose monitor strips Test 3 times a day & as needed for symptoms of irregular blood glucose. Dispense sufficient amount for indicated testing frequency plus additional to accommodate PRN testing needs.  11/8/21   Jodi Malloy MD   Lancets MISC 1 each by Does not apply route 3 times daily 11/8/21   Jodi Malloy MD   Insulin Pen Needle ([employer] PEN NEEDLES 31G) 31G X 8 MM MISC 1 each by Does not apply route daily 11/8/21   Franko Royal MD   HYDROcodone-acetaminophen (NORCO) 5-325 MG per tablet Take 1 tablet by mouth every 8 hours as needed for Pain for up to 20 doses. Intended supply: 3 days. Take lowest dose possible to manage pain 11/8/21 11/15/21  Brittney Jerez MD   doxycycline hyclate (VIBRA-TABS) 100 MG tablet Take 1 tablet by mouth 2 times daily for 5 days 11/8/21 11/13/21  Brittney Jerez MD   metFORMIN (GLUCOPHAGE) 1000 MG tablet TAKE 1 TABLET BY MOUTH 2 TIMES DAILY (WITH MEALS) 10/18/21   Monique Faith MD   pregabalin (LYRICA) 75 MG capsule Take 1 capsule by mouth 2 times daily for 30 days. Patient taking differently: Take 100 mg by mouth 2 times daily.   8/9/21 9/8/21  Cheryle Castilla, MD   pantoprazole (PROTONIX) 20 MG tablet Take 1 tablet by mouth 2 times daily (before meals) 7/22/21   Adriel Spain MD   venlafaxine (EFFEXOR XR) 150 MG extended release capsule Take 1 capsule by mouth daily (with breakfast) 7/22/21   Adriel Spain MD   dicyclomine (BENTYL) 10 MG capsule Take 1 capsule by mouth 4 times daily 7/12/21   Zach Pfeiffer MD   traZODone (DESYREL) 150 MG tablet Take 1 tablet by mouth nightly  Patient taking differently: Take 200 mg by mouth nightly  11/18/20   Adriel Spain MD   FREESTYLE LITE strip 1 each by Does not apply route 3 times daily 11/11/20   Adriel Spain MD   albuterol sulfate  (90 Base) MCG/ACT inhaler Inhale 2 puffs into the lungs every 4 hours as needed for Wheezing 10/20/20 9/22/21  Adriel Spain MD   FLOVENT  MCG/ACT inhaler Inhale 2 puffs into the lungs 2 times daily 10/20/20   Adriel Spain MD   budesonide-formoterol (SYMBICORT) 80-4.5 MCG/ACT AERO Inhale 2 puffs into the lungs 2 times daily 10/20/20   Adriel Spain MD       Future Appointments   Date Time Provider Jj Shepardi   11/15/2021 10:00 AM Rafael Henley MD [address] [facility]

## 2021-11-15 ENCOUNTER — TELEPHONE (OUTPATIENT)
Dept: FAMILY MEDICINE CLINIC | Age: 56
End: 2021-11-15

## 2021-11-15 ENCOUNTER — OFFICE VISIT (OUTPATIENT)
Dept: FAMILY MEDICINE CLINIC | Age: 56
End: 2021-11-15
Payer: COMMERCIAL

## 2021-11-15 VITALS
BODY MASS INDEX: 37.11 KG/M2 | SYSTOLIC BLOOD PRESSURE: 135 MMHG | WEIGHT: 223 LBS | HEART RATE: 87 BPM | DIASTOLIC BLOOD PRESSURE: 88 MMHG | TEMPERATURE: 98.1 F

## 2021-11-15 DIAGNOSIS — M19.90 ARTHRITIS: ICD-10-CM

## 2021-11-15 DIAGNOSIS — M79.7 FIBROMYALGIA: ICD-10-CM

## 2021-11-15 DIAGNOSIS — G62.9 NEUROPATHY: ICD-10-CM

## 2021-11-15 DIAGNOSIS — E11.69 DIABETES MELLITUS TYPE 2 IN OBESE (HCC): Primary | ICD-10-CM

## 2021-11-15 DIAGNOSIS — E66.9 DIABETES MELLITUS TYPE 2 IN OBESE (HCC): Primary | ICD-10-CM

## 2021-11-15 PROCEDURE — 99214 OFFICE O/P EST MOD 30 MIN: CPT | Performed by: STUDENT IN AN ORGANIZED HEALTH CARE EDUCATION/TRAINING PROGRAM

## 2021-11-15 PROCEDURE — 99211 OFF/OP EST MAY X REQ PHY/QHP: CPT | Performed by: STUDENT IN AN ORGANIZED HEALTH CARE EDUCATION/TRAINING PROGRAM

## 2021-11-15 PROCEDURE — 1111F DSCHRG MED/CURRENT MED MERGE: CPT | Performed by: STUDENT IN AN ORGANIZED HEALTH CARE EDUCATION/TRAINING PROGRAM

## 2021-11-15 RX ORDER — PREGABALIN 100 MG/1
100 CAPSULE ORAL 2 TIMES DAILY
Qty: 60 CAPSULE | Refills: 0 | Status: SHIPPED | OUTPATIENT
Start: 2021-11-15 | End: 2021-12-22 | Stop reason: SDUPTHER

## 2021-11-15 RX ORDER — LANCETS 30 GAUGE
1 EACH MISCELLANEOUS 3 TIMES DAILY
Qty: 200 EACH | Refills: 5 | Status: SHIPPED | OUTPATIENT
Start: 2021-11-15 | End: 2022-03-07

## 2021-11-15 RX ORDER — INSULIN GLARGINE 100 [IU]/ML
30 INJECTION, SOLUTION SUBCUTANEOUS 2 TIMES DAILY
Qty: 5 PEN | Refills: 5 | Status: SHIPPED | OUTPATIENT
Start: 2021-11-15 | End: 2022-01-20 | Stop reason: SDUPTHER

## 2021-11-15 RX ORDER — GLUCOSAMINE HCL/CHONDROITIN SU 500-400 MG
CAPSULE ORAL
Qty: 100 EACH | Refills: 5 | Status: SHIPPED | OUTPATIENT
Start: 2021-11-15 | End: 2022-03-15 | Stop reason: SDUPTHER

## 2021-11-15 RX ORDER — IBUPROFEN 800 MG/1
800 TABLET ORAL EVERY 8 HOURS PRN
Qty: 42 TABLET | Refills: 1 | Status: SHIPPED | OUTPATIENT
Start: 2021-11-15 | End: 2022-09-08

## 2021-11-15 RX ORDER — TRAZODONE HYDROCHLORIDE 150 MG/1
150 TABLET ORAL NIGHTLY
Qty: 30 TABLET | Refills: 3 | Status: SHIPPED | OUTPATIENT
Start: 2021-11-15 | End: 2021-12-17 | Stop reason: SDUPTHER

## 2021-11-15 ASSESSMENT — ENCOUNTER SYMPTOMS
ABDOMINAL DISTENTION: 0
ABDOMINAL PAIN: 0
SHORTNESS OF BREATH: 0
WHEEZING: 0
VOMITING: 0
DIARRHEA: 0
CONSTIPATION: 0
BACK PAIN: 1
NAUSEA: 0
COUGH: 0

## 2021-11-15 NOTE — PROGRESS NOTES
Diabetic visit information    BP Readings from Last 3 Encounters:   11/08/21 139/88   10/27/21 126/86   10/13/21 110/74       Hemoglobin A1C (%)   Date Value   11/04/2021 11.9 (H)   09/16/2021 14.2 (H)   08/09/2021 12.1     Microalb/Crt. Ratio (mcg/mg creat)   Date Value   10/20/2020 20     LDL Cholesterol (mg/dL)   Date Value   12/30/2020 146 (H)               Have you changed or started any medications since your last visit including any over-the-counter medicines, vitamins, or herbal medicines? no   Have you stopped taking any of your medications? Is so, why? -  no  Are you having any side effects from any of your medications? - no    Have you seen any other physician or provider since your last visit?  no   Have you had any other diagnostic tests since your last visit?  no   Have you been seen in the emergency room and/or had an admission in a hospital since we last saw you?  no     Have you had your annual diabetic retinal (eye) exam? No   (ensure copy of exam is in the chart)    Have you had your routine dental cleaning in the past 6 months? no    Do you have an active MyChart account? If not, what are your barriers? No:     Patient Care Team:  Leo Rodriguez MD as PCP - General (Family Medicine)  Namrata Larkin MD as Consulting Physician (Infectious Diseases)  Lorenzo Torres MD as Consulting Physician (Infectious Diseases)  Rosibel Caro as [address] as Health     Medical history Review  Past Medical, Family, and Social History reviewed and does not contribute to the patient presenting condition.     Health Maintenance   Topic Date Due    Pneumococcal 0-64 years Vaccine (1 of 2 - PPSV23) Never done    Diabetic retinal exam  Never done    Hepatitis B vaccine (1 of 3 - Risk 3-dose series) Never done    Cervical cancer screen  Never done    Colon cancer screen colonoscopy  Never done    Breast cancer screen  Never done    Shingles Vaccine (1 of 2) Never done  Flu vaccine (1) Never done    Diabetic microalbuminuria test  10/20/2021    COVID-19 Vaccine (2 - Pfizer 3-dose booster series) 10/26/2021    Lipid screen  12/30/2021    Diabetic foot exam  01/22/2022    A1C test (Diabetic or Prediabetic)  02/04/2022    DTaP/Tdap/Td vaccine (4 - Td or Tdap) 09/14/2031    Hepatitis C screen  Completed    HIV screen  Completed    Hepatitis A vaccine  Aged Out    Hib vaccine  Aged Out    Meningococcal (ACWY) vaccine  Aged Out

## 2021-11-15 NOTE — PROGRESS NOTES
Attending Physician Statement  I have discussed the care of Melina Li 49 y.o. female, including pertinent history and exam findings, with the resident Dr. Vikram Varela MD.    History and Exam:   Chief Complaint   Patient presents with    Follow-Up from Hospital     patient states her leg is feeling a little better    Health Maintenance     patient refused flu shot       Past Medical History:   Diagnosis Date    Acid reflux 5/29/2017    Acute cystitis with hematuria 10/11/2016    Acute non-recurrent maxillary sinusitis 11/28/2017    Asthma     Bipolar 1 disorder ([address] 1/4/2018    Bipolar disorder, mixed ([address]     BMI 34.0-34.9,adult 11/28/2017    Cannabis use disorder, severe, dependence ([address] 12/19/2017    Cerebrovascular accident (CVA) ([address] 6/14/2017    Chest pain 11/5/2016    Chronic renal insufficiency     Cocaine abuse ([address] 1/5/2018    COVID-19 virus RNA test result unknown     DDD (degenerative disc disease), cervical     Diabetes mellitus ([address]     Dizziness 6/13/2017    Fibromyalgia     History of stroke 9/9/2017    Homicidal ideation 11/6/2017    Hyperglycemia     Hypertension     Hypotension 1/18/2019    IDDM (insulin dependent diabetes mellitus) 12/21/2015    Lupus ([address]     Migraine     Neuropathy     Neuropathy     Polysubstance abuse ([address] 9/17/2017    Post traumatic stress disorder (PTSD)     Posttraumatic stress disorder 5/29/2017    Recurrent depression ([address] 5/29/2017    Recurrent major depressive disorder, in partial remission ([address] 11/28/2017    Screening mammogram, encounter for 11/28/2017    Severe recurrent major depression with psychotic features ([address] 12/19/2017    Severe recurrent major depression without psychotic features ([address] 12/19/2017    Stroke (cerebrum) ([address] 6/14/2017    Stroke ([address]     per chart notes    Suicidal ideation     Suicidal intent 3/10/2017    Vitamin D deficiency 11/28/2017    White matter changes 6/13/2017 Allergies   Allergen Reactions    Bactrim [Sulfamethoxazole-Trimethoprim] Swelling    Adhesive Tape Rash      I have seen and examined the patient and the key elements of the encounter have been performed by me. BP Readings from Last 3 Encounters:   11/15/21 (!) 142/94   11/08/21 139/88   10/27/21 126/86     BP (!) 142/94 (Site: Left Upper Arm, Position: Sitting, Cuff Size: Medium Adult)   Pulse 87   Temp 98.1 °F (36.7 °C) (Temporal)   Wt 223 lb (101.2 kg)   LMP  (LMP Unknown)   BMI 37.11 kg/m²   Lab Results   Component Value Date    WBC 5.3 11/08/2021    HGB 9.8 (L) 11/08/2021    HCT 30.7 (L) 11/08/2021     11/08/2021    CHOL 275 (H) 12/30/2020    TRIG 246 (H) 12/30/2020    HDL 80 12/30/2020    ALT 11 10/08/2021    AST 16 10/08/2021     11/08/2021    K 4.3 11/08/2021     11/08/2021    CREATININE 0.67 11/08/2021    BUN 9 11/08/2021    CO2 20 11/08/2021    TSH 0.82 09/16/2021    LABA1C 11.9 (H) 11/04/2021    LABMICR 20 10/20/2020     Lab Results   Component Value Date    LABALBU 3.6 10/08/2021     No results found for: IRON, TIBC, FERRITIN  Lab Results   Component Value Date    LDLCHOLESTEROL 146 (H) 12/30/2020     I agree with the assessment, plan and the diagnosis of    Diagnosis Orders   1. Diabetes mellitus type 2 in obese (HCC)  insulin glargine (LANTUS SOLOSTAR) 100 UNIT/ML injection pen    NM DISCHARGE MEDS RECONCILED W/ CURRENT OUTPATIENT MED LIST    DME Order for Glucometer as OP    Lancets MISC   2. Fibromyalgia  pregabalin (LYRICA) 100 MG capsule   3. Neuropathy  pregabalin (LYRICA) 100 MG capsule    NM DISCHARGE MEDS RECONCILED W/ CURRENT OUTPATIENT MED LIST   4. Arthritis  ibuprofen (ADVIL;MOTRIN) 800 MG tablet    . I agree with orders as documented by the resident. More than 25 minutes spent  in face to face encounter with the patient and more than half in counseling. Patient's questions were answered. Patient Voiced understanding to the counseling.   Return in about 2 weeks (around 11/29/2021).    (GC Modifier)-Dr. Vazquez Diaz MD

## 2021-11-15 NOTE — PROGRESS NOTES
Subjective:    Drew Rosenberg is a 49 y.o. female with  has a past medical history of Acid reflux, Acute cystitis with hematuria, Acute non-recurrent maxillary sinusitis, Asthma, Bipolar 1 disorder ([address] Bipolar disorder, mixed ([address] BMI 34.0-34.9,adult, Cannabis use disorder, severe, dependence ([address] Cerebrovascular accident (CVA) ([address] Chest pain, Chronic renal insufficiency, Cocaine abuse ([address] COVID-19 virus RNA test result unknown, DDD (degenerative disc disease), cervical, Diabetes mellitus ([address] Dizziness, Fibromyalgia, History of stroke, Homicidal ideation, Hyperglycemia, Hypertension, Hypotension, IDDM (insulin dependent diabetes mellitus), Lupus ([address] Migraine, Neuropathy, Neuropathy, Polysubstance abuse ([address] Post traumatic stress disorder (PTSD), Posttraumatic stress disorder, Recurrent depression ([address] Recurrent major depressive disorder, in partial remission ([address] Screening mammogram, encounter for, Severe recurrent major depression with psychotic features ([address] Severe recurrent major depression without psychotic features ([address] Stroke (cerebrum) ([address] Stroke ([address] Suicidal ideation, Suicidal intent, Vitamin D deficiency, and White matter changes. Presented to the office today for:  Chief Complaint   Patient presents with    Follow-Up from Hospital     patient states her leg is feeling a little better    Health Maintenance     patient refused flu shot       HPI   51-year-old female came for follow-up after hospital discharge for DKA. Complains of bilateral wrist and hand pain started yesterday. Its like burning pain. She has tried Tylenol without significant relief. She also has chronic low back pain with sciatica, she is starting home physical therapy. She was admitted in the hospital earlier this month for DKA. She states she fell down and could not lift herself up because of knees problem.   She could not take her medications she did not eat and that so she ended up in the hospital with DKA. She takes Lantus 30 units twice daily. Review of Systems   Constitutional: Negative for chills and fever. HENT: Negative. Respiratory: Negative for cough, shortness of breath and wheezing. Cardiovascular: Negative for chest pain, palpitations and leg swelling. Gastrointestinal: Negative for abdominal distention, abdominal pain, constipation, diarrhea, nausea and vomiting. Genitourinary: Negative. Musculoskeletal: Positive for arthralgias, back pain and myalgias. Skin: Negative. Neurological: Negative. Psychiatric/Behavioral: Negative. The patient has a   Family History   Problem Relation Age of Onset    Diabetes Mother     Stroke Mother     Diabetes Father     Diabetes Sister     Diabetes Brother     Other Son         GSW    No Known Problems Sister        Objective:    BP (!) 142/94 (Site: Left Upper Arm, Position: Sitting, Cuff Size: Medium Adult)   Pulse 87   Temp 98.1 °F (36.7 °C) (Temporal)   Wt 223 lb (101.2 kg)   LMP  (LMP Unknown)   BMI 37.11 kg/m²    BP Readings from Last 3 Encounters:   11/15/21 (!) 142/94   11/08/21 139/88   10/27/21 126/86       Physical Exam  Vitals and nursing note reviewed. Constitutional:       General: She is not in acute distress. Appearance: She is obese. HENT:      Head: Normocephalic and atraumatic. Cardiovascular:      Rate and Rhythm: Normal rate and regular rhythm. Pulses: Normal pulses. Heart sounds: Normal heart sounds. No murmur heard. Pulmonary:      Effort: Pulmonary effort is normal. No respiratory distress. Breath sounds: Normal breath sounds. Abdominal:      General: Abdomen is flat. Bowel sounds are normal.      Palpations: Abdomen is soft. Musculoskeletal:         General: Tenderness present. No swelling. Normal range of motion. Skin:     General: Skin is warm and dry. Capillary Refill: Capillary refill takes less than 2 seconds.       Coloration: Skin is not jaundiced or pale. Findings: No erythema. Neurological:      General: No focal deficit present. Mental Status: She is alert and oriented to person, place, and time. Psychiatric:         Mood and Affect: Mood normal.         Lab Results   Component Value Date    WBC 5.3 11/08/2021    HGB 9.8 (L) 11/08/2021    HCT 30.7 (L) 11/08/2021     11/08/2021    CHOL 275 (H) 12/30/2020    TRIG 246 (H) 12/30/2020    HDL 80 12/30/2020    ALT 11 10/08/2021    AST 16 10/08/2021     11/08/2021    K 4.3 11/08/2021     11/08/2021    CREATININE 0.67 11/08/2021    BUN 9 11/08/2021    CO2 20 11/08/2021    TSH 0.82 09/16/2021    LABA1C 11.9 (H) 11/04/2021    LABMICR 20 10/20/2020     Lab Results   Component Value Date    CALCIUM 8.5 (L) 11/08/2021    PHOS 3.2 11/06/2021     Lab Results   Component Value Date    LDLCHOLESTEROL 146 (H) 12/30/2020       Assessment and Plan:    1. Diabetes mellitus type 2 in obese (HCC)  Continue Lantus 30 units twice daily. - insulin glargine (LANTUS SOLOSTAR) 100 UNIT/ML injection pen; Inject 30 Units into the skin 2 times daily  Dispense: 5 pen; Refill: 5  - TX DISCHARGE MEDS RECONCILED W/ CURRENT OUTPATIENT MED LIST    2. Fibromyalgia  - pregabalin (LYRICA) 100 MG capsule; Take 1 capsule by mouth 2 times daily for 30 days. Dispense: 60 capsule; Refill: 0    3. Peripheral neuropathy  - pregabalin (LYRICA) 100 MG capsule; Take 1 capsule by mouth 2 times daily for 30 days. Dispense: 60 capsule; Refill: 0  - TX DISCHARGE MEDS RECONCILED W/ CURRENT OUTPATIENT MED LIST    4. Arthritis  Continue home physical therapy. - ibuprofen (ADVIL;MOTRIN) 800 MG tablet; Take 1 tablet by mouth every 8 hours as needed for Pain  Dispense: 42 tablet; Refill: 1        Requested Prescriptions     Signed Prescriptions Disp Refills    pregabalin (LYRICA) 100 MG capsule 60 capsule 0     Sig: Take 1 capsule by mouth 2 times daily for 30 days.     traZODone (DESYREL) 150 MG tablet 30 tablet 3 Sig: Take 1 tablet by mouth nightly    insulin glargine (LANTUS SOLOSTAR) 100 UNIT/ML injection pen 5 pen 5     Sig: Inject 30 Units into the skin 2 times daily    ibuprofen (ADVIL;MOTRIN) 800 MG tablet 42 tablet 1     Sig: Take 1 tablet by mouth every 8 hours as needed for Pain       Medications Discontinued During This Encounter   Medication Reason    traZODone (DESYREL) 150 MG tablet REORDER    pregabalin (LYRICA) 75 MG capsule     insulin glargine (LANTUS SOLOSTAR) 100 UNIT/ML injection pen REORDER       Terri received counseling on the following healthy behaviors: nutrition, exercise and medication adherence    Discussed use,benefit, and side effects of prescribed medications. Barriers to medication compliance addressed. All patient questions answered. Pt voiced understanding. Return in about 2 weeks (around 11/29/2021). Disclaimer: Some orall of this note was transcribed using voice-recognition software. This may cause typographical errors occasionally. Although all effort is made to fix these errors, please do not hesitate to contact our office if there Giovani Chau concern with the understanding of this note.

## 2021-11-15 NOTE — PATIENT INSTRUCTIONS
Thank you for letting us take care of you today. We hope all your questions were addressed. If a question was overlooked or something else comes to mind after you return home, please contact a member of your Care Team listed below. Your Care Team at [place] [address] is Team #3  Sally Lindo MD (Faculty)  Jaqueline Mcintosh MD (Faculty  Sarahjames Sanchez MD (Resident)  Francis Baker (Resident)   Phylicia Acevedo MD (Resident)  Diogo Mansfield, MAREK Patton., RMDONNA Valdes., RMA  [place] ([address] [facility])  [place], [place] ([address] [facility])    Zuleyma Bartlett [facility] (Clinical Pharmacist)     Office phone number: [phone]    If you need to get in right away due to illness, please be advised we have \"Same Day\" appointments available Monday-Friday. Please call us at [phone] option #3 to schedule your \"Same Day\" appointment. Schedule a Vaccine  When you qualify to receive the vaccine, call the [facility]) COVID-19 Vaccination Hotline to schedule your appointment or to get additional information about the [facility]) locations which are offering the COVID-19 vaccine. To be 94% effective, it's important that you receive two doses of one of the COVID-19 vaccines. -If you are receiving the Lewis Peter vaccine, your second shot will be scheduled as close to 21 days after the first shot as possible. -If you are receiving the Moderna vaccine, your second shot will be scheduled as close to 28 days after the first shot as possible. [facility]) COVID-19 Vaccination Hotline: [phone]    Links to [facility]) website and [facility] website:    Alexis Bittar. [email or web address]    [email or web address]

## 2021-11-18 ENCOUNTER — CARE COORDINATION (OUTPATIENT)
Dept: CARE COORDINATION | Age: 56
End: 2021-11-18

## 2021-11-18 NOTE — CARE COORDINATION
HC attempted to contact patient today, there was no answer. HC left a message for the patient. Patient phoned the [facility] OF [facility].  back and gave her an update on her health. Patient stated  that she is getting better and hopes to entertain a couple friends  from [place] on tomorrow. Patient also reported that she was supposed a nurse from [facility] that was supposed  to come out two weeks ago, and no one has shown. Patient stated that RN/Graciela  completed her intake and she's been unable to reach anyone by phone. Patient  stated that all calls go straight to the voicemail. HC informed the patient that she will  share  this information to [facility]/Mikayla Mclean.     Plan of Care  [facility], [facility]. will follow up with patient regarding contacting [facility]

## 2021-11-19 ENCOUNTER — CARE COORDINATION (OUTPATIENT)
Dept: CARE COORDINATION | Age: 56
End: 2021-11-19

## 2021-11-19 NOTE — CARE COORDINATION
Phoned patient today to follow up with her regarding her visit with friends  from [place]. Patient stated that she had a good time with her friends  and was surprised that the nurse showed up. Patient was very upset due   to the fact that the nurse came and hadn't called before coming. Patient  also stated that she refused to sign paperwork today, due to not having   any services, since the intake.       Plan of Care  [facility] OF [place], [facility]. will follow up with patient

## 2021-12-07 ENCOUNTER — CARE COORDINATION (OUTPATIENT)
Dept: CARE COORDINATION | Age: 56
End: 2021-12-07

## 2021-12-07 NOTE — CARE COORDINATION
Patient phoned the [facility], [facility]. today, stating that she needed the date of her upcoming appointment. [facility]. provided patient with the information and informed her that she would arrange for her medical transportation. Patient agreed to the plan. HC  texted the patient the travel arrangements to her cell phone, and provided  her with the confirmation number of the trip.     Plan of Care  [facility] will follow up with patient for other social needs

## 2021-12-10 ENCOUNTER — CARE COORDINATION (OUTPATIENT)
Dept: CARE COORDINATION | Age: 56
End: 2021-12-10

## 2021-12-10 ASSESSMENT — LIFESTYLE VARIABLES: HOW MANY STANDARD DRINKS CONTAINING ALCOHOL DO YOU HAVE ON A TYPICAL DAY: 1 OR 2

## 2021-12-10 NOTE — CARE COORDINATION
Ambulatory Care Coordination Note  12/10/2021  CM Risk Score: 2  Charlson 10 Year Mortality Risk Score: 79%     ACC: Jurline Render    Summary Note: Miky Thrasher reports she is doing \"OK\" today. Time was spent discussing her increase in her A1c. She is agreeable to a referral to the care coordinator dietician, Darrius Kelsey. She does report she is eating 3 meals a day. CM asked about what she is eating at meal time. CM is not so sure she is eating as reported or 3 meals a day. Miky Thrasher reports she is taking all her medications as prescribed. She reports she forgets to eat and does forget her medications at times. She is agreeable to have her medications blister packed. She currently gets her medications from [address] [place] [address]. Miky Thrasher continues to grieve her  family members. She is agreeable to see Dr. Rick Zimmer. Plan:  Refer to dietician. (done)  Call [address] [place] [address] for blister packing. (VM left requesting a return call)  Reach out to PCP office to assist in scheduling with Dr. Rick Zimmer. Message sent to PCP office. F/U next Wednesday on the above interventions. Diabetes Assessment    Medic Alert ID: No  Meal Planning: Avoidance of concentrated sweets   How often do you test your blood sugar?: Daily, Bedtime (Comment: fasting)   Do you have barriers with adherence to non-pharmacologic self-management interventions?  (Nutrition/Exercise/Self-Monitoring): Yes   Have you ever had to go to the ED for symptoms of low blood sugar?: No       No patient-reported symptoms   Do you have hyperglycemia symptoms?: No   Do you have hypoglycemia symptoms?: No   Last Blood Sugar Value: 120   Blood Sugar Monitoring Regimen: Morning Fasting, At Bedtime   Blood Sugar Trends: Fluctuating                Care Coordination Interventions    Program Enrollment: Complex Care  Referral from Primary Care Provider: No  Suggested Interventions and Community Resources  Diabetes Education: Not Started  Grief Counselor: Not Started  Home Health Services: Completed (Comment: [address])  Medi Set or Pill Pack: Not Started  Physical Therapy: Not Started  Registered Dietician: Not Started  Social Work: Completed  Transportation Support: In Process  Zone Management Tools: Completed (Comment: DM)         Goals Addressed    None         Prior to Admission medications    Medication Sig Start Date End Date Taking? Authorizing Provider   pregabalin (LYRICA) 100 MG capsule Take 1 capsule by mouth 2 times daily for 30 days. 11/15/21 12/15/21  Jeniffer Conn MD   traZODone (DESYREL) 150 MG tablet Take 1 tablet by mouth nightly 11/15/21   Jeniffer Conn MD   insulin glargine (LANTUS SOLOSTAR) 100 UNIT/ML injection pen Inject 30 Units into the skin 2 times daily 11/15/21   Jeniffer Conn MD   ibuprofen (ADVIL;MOTRIN) 800 MG tablet Take 1 tablet by mouth every 8 hours as needed for Pain 11/15/21 11/29/21  Jeniffer Conn MD   Lancets MISC 1 each by Does not apply route 3 times daily 11/15/21   Jeniffer Conn MD   Alcohol Swabs 70 % PADS Use 1 swab 3 times daily as needed 11/15/21   Jeniffer Conn MD   mirtazapine (REMERON) 7.5 MG tablet Take 1 tablet by mouth nightly 11/8/21   Vidal Rose MD   blood glucose monitor strips Test 3 times a day & as needed for symptoms of irregular blood glucose. Dispense sufficient amount for indicated testing frequency plus additional to accommodate PRN testing needs.  11/8/21   Vidal Rose MD   Lancets MISC 1 each by Does not apply route 3 times daily 11/8/21   Vidal Rose MD   Insulin Pen Needle ([employer] PEN NEEDLES 31G) 31G X 8 MM MISC 1 each by Does not apply route daily 11/8/21   Tex Carrel, MD   metFORMIN (GLUCOPHAGE) 1000 MG tablet TAKE 1 TABLET BY MOUTH 2 TIMES DAILY (WITH MEALS) 10/18/21   Maia Urban MD   pantoprazole (PROTONIX) 20 MG tablet Take 1 tablet by mouth 2 times daily (before meals) 7/22/21   Joe Domínguez MD   venlafaxine (EFFEXOR XR) 150 MG extended release capsule Take 1 capsule by mouth daily (with breakfast) 7/22/21   Namita Cassidy MD   dicyclomine (BENTYL) 10 MG capsule Take 1 capsule by mouth 4 times daily 7/12/21   Marlane Kussmaul, MD   FREESTYLE LITE strip 1 each by Does not apply route 3 times daily 11/11/20   Namita Cassidy MD   albuterol sulfate  (90 Base) MCG/ACT inhaler Inhale 2 puffs into the lungs every 4 hours as needed for Wheezing 10/20/20 9/22/21  Namita Cassidy MD   FLOVENT  MCG/ACT inhaler Inhale 2 puffs into the lungs 2 times daily 10/20/20   Namita Cassidy MD   budesonide-formoterol (SYMBICORT) 80-4.5 MCG/ACT AERO Inhale 2 puffs into the lungs 2 times daily 10/20/20   Namita Cassidy MD       Future Appointments   Date Time Provider Jj Hernandez   12/22/2021  2:30 PM Namita Cassidy MD [address] [facility]

## 2021-12-13 ENCOUNTER — CARE COORDINATION (OUTPATIENT)
Dept: CARE COORDINATION | Age: 56
End: 2021-12-13

## 2021-12-14 DIAGNOSIS — E11.69 DIABETES MELLITUS TYPE 2 IN OBESE (HCC): ICD-10-CM

## 2021-12-14 DIAGNOSIS — E66.9 DIABETES MELLITUS TYPE 2 IN OBESE (HCC): ICD-10-CM

## 2021-12-14 RX ORDER — VENLAFAXINE HYDROCHLORIDE 150 MG/1
150 CAPSULE, EXTENDED RELEASE ORAL
Qty: 30 CAPSULE | Refills: 3 | Status: SHIPPED | OUTPATIENT
Start: 2021-12-14 | End: 2021-12-17 | Stop reason: SDUPTHER

## 2021-12-14 NOTE — TELEPHONE ENCOUNTER
Last visit:   Last Med refill:   Does patient have enough medication for 72 hours: No:     Next Visit Date:  Future Appointments   Date Time Provider Jj Mary   12/16/2021  3:30 PM Roberto Méndez, PhD Rodriguez Irwin   12/22/2021  2:30 PM Jaz Santos MD [address] [place] [address] Maintenance   Topic Date Due    Pneumococcal 0-64 years Vaccine (1 of 2 - PPSV23) Never done    Diabetic retinal exam  Never done    Hepatitis B vaccine (1 of 3 - Risk 3-dose series) Never done    Cervical cancer screen  Never done    Colon cancer screen colonoscopy  Never done    Breast cancer screen  Never done    Shingles Vaccine (1 of 2) Never done    Flu vaccine (1) Never done    Diabetic microalbuminuria test  10/20/2021    COVID-19 Vaccine (2 - Pfizer 3-dose booster series) 10/26/2021    Lipid screen  12/30/2021    Diabetic foot exam  01/22/2022    A1C test (Diabetic or Prediabetic)  02/04/2022    DTaP/Tdap/Td vaccine (4 - Td or Tdap) 09/14/2031    Hepatitis C screen  Completed    HIV screen  Completed    Hepatitis A vaccine  Aged Out    Hib vaccine  Aged Out    Meningococcal (ACWY) vaccine  Aged Out       Hemoglobin A1C (%)   Date Value   11/04/2021 11.9 (H)   09/16/2021 14.2 (H)   08/09/2021 12.1             ( goal A1C is < 7)   Microalb/Crt.  Ratio (mcg/mg creat)   Date Value   10/20/2020 20     LDL Cholesterol (mg/dL)   Date Value   12/30/2020 146 (H)   12/18/2019 100       (goal LDL is <100)   AST (U/L)   Date Value   10/08/2021 16     ALT (U/L)   Date Value   10/08/2021 11     BUN (mg/dL)   Date Value   11/08/2021 9     BP Readings from Last 3 Encounters:   11/15/21 135/88   11/08/21 139/88   10/27/21 126/86          (goal 120/80)    All Future Testing planned in [facility]  Lab Frequency Next Occurrence   Hemoglobin A1C Once 01/13/2022   VL LOWER EXTREMITY ARTERIAL SEGMENTAL PRESSURES W PPG Once 01/22/2022   Cologuard (For External Results Only) Once 07/12/2022   ARMAND Digital Screen Bilateral [TIH0400] Once 08/09/2022               Patient Active Problem List:     IDDM (insulin dependent diabetes mellitus)     Lupus (HCC)     Post traumatic stress disorder (PTSD)     Acute cystitis with hematuria     Hyperglycemia     Suicidal ideation     Arthritis     Chronic back pain     Acid reflux     History of stroke     Polysubstance abuse ([address]     Cocaine abuse ([address]     Chest pain     Acute non-recurrent maxillary sinusitis     Acute respiratory failure with hypercapnia (HCC)     Alcohol use disorder, severe, dependence ([address]     Asthma exacerbation attacks     Bilateral edema of lower extremity     Bipolar 1 disorder, depressed, severe (HCC)     BMI 34.0-34.9,adult     Cannabis use disorder, severe, dependence ([address]     Cocaine use disorder, severe, dependence ([address]     Dizziness     Dyslipidemia     Family history of colon cancer     History of lupus     Homicidal ideation     Hypotension     Neuropathy     Normocytic anemia     Recurrent depression (HCC)     Recurrent major depressive disorder, in partial remission (HCC)     Severe recurrent major depression with psychotic features ([address]     Severe recurrent major depression without psychotic features ([address]     Upper GI bleed     Vitamin D deficiency     White matter changes     Gastroesophageal reflux disease     Stroke (cerebrum) ([address]     Rib lesion     Diabetes mellitus type 2 in obese (HCC)     YAEL (generalized anxiety disorder)     Posttraumatic stress disorder     Suicidal intent     Leg weakness, bilateral     Poorly controlled diabetes mellitus (HCC)     Felon of finger     Osteomyelitis ([facility])     Recurrent falls     Seasonal allergic rhinitis     Fibromyalgia     Tenosynovitis of finger     Open wound of right index finger     Adverse effect of COVID-19 vaccine     Wound dehiscence     Amputation finger     Abscess of right index finger     DKA, type 2, not at goal [facility])     Major depression           Please address the medication refill and close the encounter. If I can be of assistance, please route to the applicable pool. Thank you.

## 2021-12-15 ENCOUNTER — CARE COORDINATION (OUTPATIENT)
Dept: CARE COORDINATION | Age: 56
End: 2021-12-15

## 2021-12-15 DIAGNOSIS — M79.7 FIBROMYALGIA: ICD-10-CM

## 2021-12-15 DIAGNOSIS — E66.9 DIABETES MELLITUS TYPE 2 IN OBESE (HCC): ICD-10-CM

## 2021-12-15 DIAGNOSIS — G62.9 NEUROPATHY: ICD-10-CM

## 2021-12-15 DIAGNOSIS — E11.69 DIABETES MELLITUS TYPE 2 IN OBESE (HCC): ICD-10-CM

## 2021-12-15 DIAGNOSIS — R10.84 GENERALIZED ABDOMINAL PAIN: ICD-10-CM

## 2021-12-17 RX ORDER — VENLAFAXINE HYDROCHLORIDE 150 MG/1
150 CAPSULE, EXTENDED RELEASE ORAL
Qty: 30 CAPSULE | Refills: 3 | Status: SHIPPED | OUTPATIENT
Start: 2021-12-17 | End: 2022-03-15 | Stop reason: SDUPTHER

## 2021-12-17 RX ORDER — DICYCLOMINE HYDROCHLORIDE 10 MG/1
10 CAPSULE ORAL 4 TIMES DAILY
Qty: 120 CAPSULE | Refills: 3 | Status: ON HOLD | OUTPATIENT
Start: 2021-12-17 | End: 2022-09-27 | Stop reason: HOSPADM

## 2021-12-17 RX ORDER — PANTOPRAZOLE SODIUM 20 MG/1
20 TABLET, DELAYED RELEASE ORAL
Qty: 30 TABLET | Refills: 3 | Status: SHIPPED | OUTPATIENT
Start: 2021-12-17 | End: 2022-03-15 | Stop reason: SDUPTHER

## 2021-12-17 RX ORDER — MIRTAZAPINE 7.5 MG/1
7.5 TABLET, FILM COATED ORAL NIGHTLY
Qty: 30 TABLET | Refills: 3 | Status: SHIPPED | OUTPATIENT
Start: 2021-12-17 | End: 2022-03-15 | Stop reason: SDUPTHER

## 2021-12-17 RX ORDER — TRAZODONE HYDROCHLORIDE 150 MG/1
150 TABLET ORAL NIGHTLY
Qty: 30 TABLET | Refills: 3 | Status: SHIPPED | OUTPATIENT
Start: 2021-12-17 | End: 2022-03-15 | Stop reason: SDUPTHER

## 2021-12-17 NOTE — CARE COORDINATION
[facility]. phoned patient to check in and to remind of her upcoming appointment on 12/16/21. HC found out that the patient was in patient and has been in the [facility] since last weekend. HC shared with the patient that she would cancel patient's transportation for her appointment on 12/16/21, and would also cancel   her appointment at [facility]. HC contacted [address] transportation and [employer] and cancelled reservations and PCP appointment. Patient received a call  from her daughter and we concluded our conversation.     Plan of Care  [facility] will follow up with patient regarding her Social needs

## 2021-12-19 NOTE — PLAN OF CARE
Problem: Safety:  Goal: Free from accidental physical injury  Description: Free from accidental physical injury  Outcome: Ongoing  Goal: Free from intentional harm  Description: Free from intentional harm  Outcome: Ongoing     Problem: Daily Care:  Goal: Daily care needs are met  Description: Daily care needs are met  Outcome: Ongoing     Problem: Pain:  Goal: Patient's pain/discomfort is manageable  Description: Patient's pain/discomfort is manageable  Outcome: Ongoing  Goal: Pain level will decrease  Description: Pain level will decrease  Outcome: Ongoing  Goal: Control of acute pain  Description: Control of acute pain  Outcome: Ongoing  Goal: Control of chronic pain  Description: Control of chronic pain  Outcome: Ongoing     Problem: Skin Integrity:  Goal: Skin integrity will stabilize  Description: Skin integrity will stabilize  Outcome: Ongoing     Problem: Falls - Risk of:  Goal: Will remain free from falls  Description: Will remain free from falls  Outcome: Ongoing  Goal: Absence of physical injury  Description: Absence of physical injury  Outcome: Ongoing     Problem: Musculor/Skeletal Functional Status  Goal: Highest potential functional level  Outcome: Ongoing Home

## 2021-12-20 ENCOUNTER — CARE COORDINATION (OUTPATIENT)
Dept: CARE COORDINATION | Age: 56
End: 2021-12-20

## 2021-12-22 ENCOUNTER — CARE COORDINATION (OUTPATIENT)
Dept: CARE COORDINATION | Age: 56
End: 2021-12-22

## 2021-12-22 ENCOUNTER — OFFICE VISIT (OUTPATIENT)
Dept: FAMILY MEDICINE CLINIC | Age: 56
End: 2021-12-22
Payer: COMMERCIAL

## 2021-12-22 ENCOUNTER — HOSPITAL ENCOUNTER (OUTPATIENT)
Age: 56
Setting detail: SPECIMEN
Discharge: HOME | End: 2021-12-22

## 2021-12-22 VITALS
WEIGHT: 233 LBS | TEMPERATURE: 96.6 F | HEART RATE: 94 BPM | SYSTOLIC BLOOD PRESSURE: 149 MMHG | BODY MASS INDEX: 38.82 KG/M2 | DIASTOLIC BLOOD PRESSURE: 89 MMHG | HEIGHT: 65 IN

## 2021-12-22 DIAGNOSIS — G62.9 NEUROPATHY: ICD-10-CM

## 2021-12-22 DIAGNOSIS — R51.9 INTRACTABLE HEADACHE, UNSPECIFIED CHRONICITY PATTERN, UNSPECIFIED HEADACHE TYPE: ICD-10-CM

## 2021-12-22 DIAGNOSIS — M32.9 SLE (SYSTEMIC LUPUS ERYTHEMATOSUS RELATED SYNDROME) (HCC): ICD-10-CM

## 2021-12-22 DIAGNOSIS — I30.0 IDIOPATHIC PERICARDITIS, UNSPECIFIED CHRONICITY: Primary | ICD-10-CM

## 2021-12-22 DIAGNOSIS — Z12.4 CERVICAL CANCER SCREENING: ICD-10-CM

## 2021-12-22 DIAGNOSIS — Z59.87: ICD-10-CM

## 2021-12-22 DIAGNOSIS — M79.7 FIBROMYALGIA: ICD-10-CM

## 2021-12-22 DIAGNOSIS — Z59.48 LACK OF FOOD: ICD-10-CM

## 2021-12-22 PROCEDURE — 99213 OFFICE O/P EST LOW 20 MIN: CPT | Performed by: STUDENT IN AN ORGANIZED HEALTH CARE EDUCATION/TRAINING PROGRAM

## 2021-12-22 PROCEDURE — 3017F COLORECTAL CA SCREEN DOC REV: CPT | Performed by: STUDENT IN AN ORGANIZED HEALTH CARE EDUCATION/TRAINING PROGRAM

## 2021-12-22 PROCEDURE — G8427 DOCREV CUR MEDS BY ELIG CLIN: HCPCS | Performed by: STUDENT IN AN ORGANIZED HEALTH CARE EDUCATION/TRAINING PROGRAM

## 2021-12-22 PROCEDURE — G8484 FLU IMMUNIZE NO ADMIN: HCPCS | Performed by: STUDENT IN AN ORGANIZED HEALTH CARE EDUCATION/TRAINING PROGRAM

## 2021-12-22 PROCEDURE — G8417 CALC BMI ABV UP PARAM F/U: HCPCS | Performed by: STUDENT IN AN ORGANIZED HEALTH CARE EDUCATION/TRAINING PROGRAM

## 2021-12-22 PROCEDURE — 1036F TOBACCO NON-USER: CPT | Performed by: STUDENT IN AN ORGANIZED HEALTH CARE EDUCATION/TRAINING PROGRAM

## 2021-12-22 RX ORDER — ACETAMINOPHEN 500 MG
1000 TABLET ORAL 3 TIMES DAILY PRN
Qty: 180 TABLET | Refills: 0 | Status: SHIPPED | OUTPATIENT
Start: 2021-12-22 | End: 2022-03-03 | Stop reason: SDUPTHER

## 2021-12-22 RX ORDER — FLUTICASONE PROPIONATE 50 MCG
1 SPRAY, SUSPENSION (ML) NASAL DAILY
Qty: 32 G | Refills: 1 | Status: SHIPPED | OUTPATIENT
Start: 2021-12-22 | End: 2022-03-15 | Stop reason: SDUPTHER

## 2021-12-22 RX ORDER — PREGABALIN 100 MG/1
200 CAPSULE ORAL 2 TIMES DAILY
Qty: 120 CAPSULE | Refills: 0 | Status: SHIPPED | OUTPATIENT
Start: 2021-12-22 | End: 2021-12-28 | Stop reason: SDUPTHER

## 2021-12-22 SDOH — ECONOMIC STABILITY - INCOME SECURITY: MATERIAL HARDSHIP DUE TO LIMITED FINANCIAL RESOURCES, NOT ELSEWHERE CLASSIFIED: Z59.87

## 2021-12-22 SDOH — ECONOMIC STABILITY - FOOD INSECURITY: OTHER SPECIFIED LACK OF ADEQUATE FOOD: Z59.48

## 2021-12-22 ASSESSMENT — ENCOUNTER SYMPTOMS
ABDOMINAL PAIN: 0
NAUSEA: 0
CHEST TIGHTNESS: 0
SINUS PAIN: 0
SORE THROAT: 0
ANAL BLEEDING: 0
COLOR CHANGE: 0
DIARRHEA: 0
WHEEZING: 0
SHORTNESS OF BREATH: 0
ABDOMINAL DISTENTION: 0
SINUS PRESSURE: 0
COUGH: 0

## 2021-12-22 NOTE — PATIENT INSTRUCTIONS
Thank you for letting us take care of you today. We hope all your questions were addressed. If a question was overlooked or something else comes to mind after you return home, please contact a member of your Care Team listed below. Your Care Team at [employer] is Team #4  Bayron Quinones MD (Faculty)  Jesse Bee MD (Resident)  Keturah Patel MD (Resident)  Italia Mercado MD (Resident)  Annalisa Green MD (Resident)  Gm GODFREY,MATTHEW Crawley., MAREK Perez., Xavi Girard., [facility] office)  Ирина Miguel, [address] (Clinical Practice Manager)  Alicia Martin [facility] (Clinical Pharmacist)       Office phone number: [phone]    If you need to get in right away due to illness, please be advised we have \"Same Day\" appointments available Monday-Friday. Please call us at [phone] option #3 to schedule your \"Same Day\" appointment.

## 2021-12-22 NOTE — PROGRESS NOTES
Visit Information    Have you changed or started any medications since your last visit including any over-the-counter medicines, vitamins, or herbal medicines? no   Have you stopped taking any of your medications? Is so, why? -  no  Are you having any side effects from any of your medications? - no    Have you seen any other physician or provider since your last visit?  no   Have you had any other diagnostic tests since your last visit? yes - labs   Have you been seen in the emergency room and/or had an admission in a hospital since we last saw you?  yes - [facility]   Have you had your routine dental cleaning in the past 6 months?  no     Do you have an active MyChart account? If no, what is the barrier?   No: declines    Patient Care Team:  Carter Holley MD as PCP - General (Family Medicine)  Monika Garcia MD as Consulting Physician (Infectious Diseases)  Petra High MD as Consulting Physician (Infectious Diseases)  Lazaro Avery as [address] [employer] as Health   Petra Calzada RD, LD as Dietitian    Medical History Review  Past Medical, Family, and Social History reviewed and does not contribute to the patient presenting condition    Health Maintenance   Topic Date Due    Pneumococcal 0-64 years Vaccine (1 of 2 - PPSV23) Never done    Diabetic retinal exam  Never done    Hepatitis B vaccine (1 of 3 - Risk 3-dose series) Never done    Cervical cancer screen  Never done    Colon cancer screen colonoscopy  Never done    Breast cancer screen  Never done    Shingles Vaccine (1 of 2) Never done    Flu vaccine (1) Never done    Diabetic microalbuminuria test  10/20/2021    COVID-19 Vaccine (2 - Pfizer 3-dose series) 10/26/2021    Lipid screen  12/30/2021    Diabetic foot exam  01/22/2022    A1C test (Diabetic or Prediabetic)  03/13/2022    DTaP/Tdap/Td vaccine (4 - Td or Tdap) 09/14/2031    Hepatitis C screen  Completed    HIV screen  Completed    Hepatitis A vaccine Aged Out    Hib vaccine  Aged Out    Meningococcal (ACWY) vaccine  Aged Out

## 2021-12-22 NOTE — PROGRESS NOTES
Ref [id number]  Subjective:    Anahi Cheema is a 49 y.o. female with  has a past medical history of Acid reflux, Acute cystitis with hematuria, Acute non-recurrent maxillary sinusitis, Asthma, Bipolar 1 disorder ([address] Bipolar disorder, mixed ([address] BMI 34.0-34.9,adult, Cannabis use disorder, severe, dependence ([address] Cerebrovascular accident (CVA) ([address] Chest pain, Chronic renal insufficiency, Cocaine abuse ([address] COVID-19 virus RNA test result unknown, DDD (degenerative disc disease), cervical, Diabetes mellitus ([address] Dizziness, Fibromyalgia, History of stroke, Homicidal ideation, Hyperglycemia, Hypertension, Hypotension, IDDM (insulin dependent diabetes mellitus), Lupus ([address] Migraine, Neuropathy, Neuropathy, Polysubstance abuse ([address] Post traumatic stress disorder (PTSD), Posttraumatic stress disorder, Recurrent depression ([address] Recurrent major depressive disorder, in partial remission ([address] Screening mammogram, encounter for, Severe recurrent major depression with psychotic features ([address] Severe recurrent major depression without psychotic features ([address] Stroke (cerebrum) ([address] Stroke ([address] Suicidal ideation, Suicidal intent, Vitamin D deficiency, and White matter changes. Presented to the office today for:  Chief Complaint   Patient presents with    Gynecologic Exam     yearly    Medication Refill     needs refills       HPI    Patient is a 68-year-old female with past medical history of lupus, diabetes. She is presenting today for follow-up of recent hospital admission. Patient was admitted to [address] [place] [address],[address] on 12/12/2021 for chest pain. At which time EKG showed diffuse ST segment elevations concerning for pericarditis likely a flareup from her lupus. She was in the hospital for 5 days and cardiology was consulted. She was discharged home on 2 weeks of indomethacin and 6 months of colchicine. She was also given referrals to rheumatology and cardiology.   She states that currently her chest pain is slightly improved however still persistent, is on and off. Denies any pain today however notes that the pain normally gets worse with exertion. Patient does have an appointment coming up with cardiology however would like to see resealed physicians going forward. She is also requesting a referral to [facility]) rheumatology. Review of Systems   Constitutional: Negative for fatigue and fever. HENT: Negative for sinus pressure, sinus pain, sore throat and tinnitus. Eyes: Negative for visual disturbance. Respiratory: Negative for cough, chest tightness, shortness of breath and wheezing. Cardiovascular: Negative for chest pain, palpitations and leg swelling. Gastrointestinal: Negative for abdominal distention, abdominal pain, anal bleeding, diarrhea and nausea. Genitourinary: Negative for enuresis, frequency and urgency. Musculoskeletal: Negative for arthralgias, gait problem and neck stiffness. Skin: Negative for color change and rash. Neurological: Negative for dizziness and headaches. Psychiatric/Behavioral: Negative for agitation and confusion. The patient has a   Family History   Problem Relation Age of Onset    Diabetes Mother     Stroke Mother     Diabetes Father     Diabetes Sister     Diabetes Brother     Other Son         GSW    No Known Problems Sister        Objective:    BP (!) 149/89 (Site: Left Upper Arm, Position: Sitting, Cuff Size: Large Adult)   Pulse 94   Temp 96.6 °F (35.9 °C) (Temporal)   Ht 5' 5\" (1.651 m)   Wt 233 lb (105.7 kg)   LMP  (LMP Unknown)   BMI 38.77 kg/m²    BP Readings from Last 3 Encounters:   12/22/21 (!) 149/89   11/15/21 135/88   11/08/21 139/88       Physical Exam  Exam conducted with a chaperone present. Constitutional:       Appearance: Normal appearance. HENT:      Head: Atraumatic.       Mouth/Throat:      Mouth: Mucous membranes are moist.      Pharynx: No oropharyngeal exudate or posterior oropharyngeal erythema. Eyes:      Extraocular Movements: Extraocular movements intact. Cardiovascular:      Rate and Rhythm: Normal rate and regular rhythm. Heart sounds: No murmur heard. No friction rub. No gallop. Pulmonary:      Effort: Pulmonary effort is normal.      Breath sounds: No wheezing, rhonchi or rales. Abdominal:      General: Abdomen is flat. Tenderness: There is no abdominal tenderness. There is no guarding. Hernia: No hernia is present. There is no hernia in the left inguinal area or right inguinal area. Genitourinary:     General: Normal vulva. Exam position: Lithotomy position. Pubic Area: No rash or pubic lice. Ever stage (genital): 5. Labia:         Right: No rash, tenderness, lesion or injury. Left: No rash, tenderness, lesion or injury. Urethra: No prolapse, urethral pain, urethral swelling or urethral lesion. Vagina: No signs of injury and foreign body. No vaginal discharge, erythema, tenderness, bleeding, lesions or prolapsed vaginal walls. Cervix: No cervical motion tenderness, discharge, friability, lesion, erythema, cervical bleeding or eversion. Uterus: Normal. Not deviated, not enlarged, not fixed, not tender and no uterine prolapse. Musculoskeletal:         General: No swelling or tenderness. Normal range of motion. Cervical back: Normal range of motion. Lymphadenopathy:      Lower Body: No right inguinal adenopathy. No left inguinal adenopathy. Skin:     General: Skin is warm. Capillary Refill: Capillary refill takes less than 2 seconds. Coloration: Skin is not jaundiced. Findings: No bruising. Neurological:      Mental Status: She is alert and oriented to person, place, and time.          Lab Results   Component Value Date    WBC 5.3 11/08/2021    HGB 9.8 (L) 11/08/2021    HCT 30.7 (L) 11/08/2021     11/08/2021    CHOL 275 (H) 12/30/2020    TRIG 246 (H) 12/30/2020 HDL 80 12/30/2020    ALT 11 10/08/2021    AST 16 10/08/2021     11/08/2021    K 4.3 11/08/2021     11/08/2021    CREATININE 0.67 11/08/2021    BUN 9 11/08/2021    CO2 20 11/08/2021    TSH 0.82 09/16/2021    LABA1C 11.9 (H) 11/04/2021    LABMICR 20 10/20/2020     Lab Results   Component Value Date    CALCIUM 8.5 (L) 11/08/2021    PHOS 3.2 11/06/2021     Lab Results   Component Value Date    LDLCHOLESTEROL 146 (H) 12/30/2020       Assessment and Plan:    1. Idiopathic pericarditis, unspecified chronicity  -Patient has completed indomethacin, instructed to continue with colchicine for 6 months.  -Referral given to [facility]) cardiology  -Patient instructed to go to the emergency department if the pain is ever constant and worsening. Patient is able to understand and teach back. - Ze Santacruz MD, Cardiology, [place]    2. SLE (systemic lupus erythematosus related syndrome) ([facility]  - Javier Garcia MD, Rheumatology, [place]    3. Cervical cancer screening  - PAP Smear; Future    7. Lack of food  - [facility] Referral for Social Determinants of Health    8. Inability to acquire clothing  - [facility] Referral for Social Determinants of Health          Requested Prescriptions      No prescriptions requested or ordered in this encounter       There are no discontinued medications. Terri received counseling on the following healthy behaviors: nutrition, exercise and medication adherence    Discussed use,benefit, and side effects of prescribed medications. Barriers to medication compliance addressed. All patient questions answered. Pt voiced understanding. Return in about 3 months (around 3/22/2022). Disclaimer: Some orall of this note was transcribed using voice-recognition software. This may cause typographical errors occasionally.  Although all effort is made to fix these errors, please do not hesitate to contact our office if there isany concern with the understanding of this note.

## 2021-12-22 NOTE — CARE COORDINATION
Nutrition Care Coordinator Follow-Up visit:    Food Recall: Activity Level:  Sedentary:  Lightly Active: Moderately Active:  Very Active:    Adult BMI:  Underweight (below 18.5)  Normal Weight (18.5-24.9)  Overweight (25-29. 9)  Obese (30-39. 9)  Morbidly Obese (>40)    Weight Change:    Plan:  Plan was established with patient:  Increase dietary fiber by consuming whole grains, fruits and vegetables:  Limit dietary cholesterol to >200mg/day: Increase water intake:  Avoid added sugar:  Avoid sweetened beverages:  Choose lean meats:      Monitoring: Will monitor weight:  Will monitor adherence to meal plan: Will monitor adherence to exercise plan: Will monitor HGA1c:    Handouts Provided :  Low Carb snacking:  Carb counting /individual meal plan:  Portion Control:  Food Labels:  Physical Activity:  Low Fat/Cholesterol:  Hypo/Hyperglycemia:  Calorie Controlled Meal Plan:    Goals: Increase water consumption to 8oz. 6-8 times daily:  Manage blood sugars by consuming 3 meals spaced every 4-5 hours with 2-3 snacks daily:  Increase fiber and decrease fat intake by consuming 1-2 fruit servings and 2-3 vegetable servings per day.   Increase physical activity by:  Consume less than 2,000mg of sodium/day  Avoid consumption of sweetened beverages and added sugar by reading food labels:  Monitor blood sugars by using meter to check blood glucose before morning meal and 2 hours after a meal daily:  Decrease risk of coronary heart disease by consuming fish that contains omega-3 fatty acids at least twice a week, avoiding partially hydrogenated oil/trans fats and limiting saturated fat intake by reading food labels:    Patient goals set:  1.  2.       Brian Wallace

## 2021-12-23 ENCOUNTER — CARE COORDINATION (OUTPATIENT)
Dept: CARE COORDINATION | Age: 56
End: 2021-12-23

## 2021-12-23 NOTE — PROGRESS NOTES
Attending Physician Statement    Wt Readings from Last 3 Encounters:   12/22/21 233 lb (105.7 kg)   11/15/21 223 lb (101.2 kg)   11/07/21 218 lb 7.6 oz (99.1 kg)     Temp Readings from Last 3 Encounters:   12/22/21 96.6 °F (35.9 °C) (Temporal)   11/15/21 98.1 °F (36.7 °C) (Temporal)   11/08/21 97.4 °F (36.3 °C) (Temporal)     BP Readings from Last 3 Encounters:   12/22/21 (!) 149/89   11/15/21 135/88   11/08/21 139/88     Pulse Readings from Last 3 Encounters:   12/22/21 94   11/15/21 87   11/08/21 97         I have discussed the care of [address] [place] [address], including pertinent history and exam findings with the resident. I have reviewed the key elements of all parts of the encounter with the resident. I agree with the assessment, plan and orders as documented by the resident.   (GE Modifier)

## 2021-12-23 NOTE — CARE COORDINATION
Registered Dietitian Initial Assessment for Care Coordination      Name-Terri Palmer  December 23, 2021    Initial Referral Reason: Diabetes    Patient Care Team:  Nisha Mitchell MD as PCP - General (Family Medicine)  Candida Olvera MD as Consulting Physician (Infectious Diseases)  Pedro Pablo Yusuf MD as Consulting Physician (Infectious Diseases)  Marzella Kocher as [address] as Health   Regina Yepez RD, FRANCESCO as Dietitian    Patient Active Problem List   Diagnosis    IDDM (insulin dependent diabetes mellitus)    Lupus ([address]    Post traumatic stress disorder (PTSD)    Acute cystitis with hematuria    Hyperglycemia    Suicidal ideation    Arthritis    Chronic back pain    Acid reflux    History of stroke    Polysubstance abuse ([address]    Cocaine abuse ([address]    Chest pain    Acute non-recurrent maxillary sinusitis    Acute respiratory failure with hypercapnia (HCC)    Alcohol use disorder, severe, dependence ([address]    Asthma exacerbation attacks    Bilateral edema of lower extremity    Bipolar 1 disorder, depressed, severe ([address]    BMI 34.0-34.9,adult    Cannabis use disorder, severe, dependence ([address]    Cocaine use disorder, severe, dependence ([address]    Dizziness    Dyslipidemia    Family history of colon cancer    History of lupus    Homicidal ideation    Hypotension    Neuropathy    Normocytic anemia    Recurrent depression ([address]    Recurrent major depressive disorder, in partial remission ([address]    Severe recurrent major depression with psychotic features ([address]    Severe recurrent major depression without psychotic features ([address]    Upper GI bleed    Vitamin D deficiency    White matter changes    Gastroesophageal reflux disease    Stroke (cerebrum) ([address]    Rib lesion    Diabetes mellitus type 2 in obese ([address]    YAEL (generalized anxiety disorder)    Posttraumatic stress disorder    Suicidal intent    Leg weakness, bilateral  Poorly controlled diabetes mellitus (HCC)    Felon of finger    Osteomyelitis (HCC)    Recurrent falls    Seasonal allergic rhinitis    Fibromyalgia    Tenosynovitis of finger    Open wound of right index finger    Adverse effect of COVID-19 vaccine    Wound dehiscence    Amputation finger    Abscess of right index finger    DKA, type 2, not at goal [facility])    Major depression       Current Outpatient Medications   Medication Sig Dispense Refill    fluticasone (FLONASE) 50 MCG/ACT nasal spray 1 spray by Each Nostril route daily 32 g 1    acetaminophen (TYLENOL) 500 MG tablet Take 2 tablets by mouth 3 times daily as needed for Pain 180 tablet 0    pregabalin (LYRICA) 100 MG capsule Take 2 capsules by mouth 2 times daily for 30 days. 120 capsule 0    dicyclomine (BENTYL) 10 MG capsule Take 1 capsule by mouth 4 times daily 120 capsule 3    metFORMIN (GLUCOPHAGE) 1000 MG tablet Take 1 tablet by mouth 2 times daily (with meals) 60 tablet 3    mirtazapine (REMERON) 7.5 MG tablet Take 1 tablet by mouth nightly 30 tablet 3    pantoprazole (PROTONIX) 20 MG tablet Take 1 tablet by mouth 2 times daily (before meals) 30 tablet 3    traZODone (DESYREL) 150 MG tablet Take 1 tablet by mouth nightly 30 tablet 3    venlafaxine (EFFEXOR XR) 150 MG extended release capsule Take 1 capsule by mouth daily (with breakfast) 30 capsule 3    insulin glargine (LANTUS SOLOSTAR) 100 UNIT/ML injection pen Inject 30 Units into the skin 2 times daily 5 pen 5    ibuprofen (ADVIL;MOTRIN) 800 MG tablet Take 1 tablet by mouth every 8 hours as needed for Pain 42 tablet 1    Lancets MISC 1 each by Does not apply route 3 times daily 200 each 5    Alcohol Swabs 70 % PADS Use 1 swab 3 times daily as needed 100 each 5    blood glucose monitor strips Test 3 times a day & as needed for symptoms of irregular blood glucose. Dispense sufficient amount for indicated testing frequency plus additional to accommodate PRN testing needs. 100 strip 0    Lancets MISC 1 each by Does not apply route 3 times daily 200 each 0    Insulin Pen Needle ([employer] PEN NEEDLES 31G) 31G X 8 MM MISC 1 each by Does not apply route daily 100 each 3    FREESTYLE LITE strip 1 each by Does not apply route 3 times daily 100 each 5    albuterol sulfate  (90 Base) MCG/ACT inhaler Inhale 2 puffs into the lungs every 4 hours as needed for Wheezing 1 Inhaler 5    FLOVENT  MCG/ACT inhaler Inhale 2 puffs into the lungs 2 times daily 1 Inhaler 3    budesonide-formoterol (SYMBICORT) 80-4.5 MCG/ACT AERO Inhale 2 puffs into the lungs 2 times daily 1 Inhaler 5     No current facility-administered medications for this visit.          Visit for:  Obesity/Weight loss  Diabetes: X  Hypertension:  Hyperlipidemia:  Other:     Anthropometric Measurements:  HT: 5'6  Weight: 233  IBW: 130 + or - 10%  BMI: 38    Biochemical Data, Medical Tests and Procedures:    Lab Results   Component Value Date    LABA1C 11.9 (H) 11/04/2021     Lab Results   Component Value Date     11/04/2021       Lab Results   Component Value Date    CHOL 275 (H) 12/30/2020    CHOL 190 12/18/2019    CHOL 149 12/09/2019     Lab Results   Component Value Date    TRIG 246 (H) 12/30/2020    TRIG 90 12/18/2019    TRIG 102 12/09/2019     Lab Results   Component Value Date    HDL 80 12/30/2020    HDL 72 12/18/2019    HDL 59 12/09/2019     Lab Results   Component Value Date    LDLCHOLESTEROL 146 (H) 12/30/2020    LDLCHOLESTEROL 100 12/18/2019    LDLCHOLESTEROL 70 12/09/2019     Lab Results   Component Value Date    VLDL NOT REPORTED (H) 12/30/2020    VLDL NOT REPORTED 12/18/2019    VLDL NOT REPORTED 12/09/2019     Lab Results   Component Value Date    CHOLHDLRATIO 3.4 12/30/2020    CHOLHDLRATIO 2.6 12/18/2019    CHOLHDLRATIO 2.5 12/09/2019       Lab Results   Component Value Date    WBC 5.3 11/08/2021    HGB 9.8 (L) 11/08/2021    HCT 30.7 (L) 11/08/2021    .4 (H) 11/08/2021     11/08/2021

## 2021-12-23 NOTE — CARE COORDINATION
HC attempted to contact patient, there was no answer. Patient's voicemail   was full. HC did send a SMS notice.     Plan of Care  [facility]. will attempt to reach patient next week

## 2021-12-27 ENCOUNTER — CARE COORDINATION (OUTPATIENT)
Dept: CARE COORDINATION | Age: 56
End: 2021-12-27

## 2021-12-27 ENCOUNTER — TELEPHONE (OUTPATIENT)
Dept: FAMILY MEDICINE CLINIC | Age: 56
End: 2021-12-27

## 2021-12-27 NOTE — CARE COORDINATION
HC attempted to contact patient today regarding a new referral statin that the   patient needs food and clothing. There was no answer, HC was unable to leave  a message due to patients voicemail being full.      Plan of Care  [facility] OF [place], [facility]. will attempt to reach out to patient on 02/28/21

## 2021-12-28 ENCOUNTER — CARE COORDINATION (OUTPATIENT)
Dept: CARE COORDINATION | Age: 56
End: 2021-12-28

## 2021-12-28 DIAGNOSIS — M79.7 FIBROMYALGIA: ICD-10-CM

## 2021-12-28 DIAGNOSIS — G62.9 NEUROPATHY: ICD-10-CM

## 2021-12-28 LAB
HPV SAMPLE: NORMAL
HPV, GENOTYPE 16: NOT DETECTED
HPV, GENOTYPE 18: NOT DETECTED
HPV, HIGH RISK OTHER: NOT DETECTED
HPV, INTERPRETATION: NORMAL
SPECIMEN DESCRIPTION: NORMAL

## 2021-12-28 RX ORDER — PREGABALIN 200 MG/1
200 CAPSULE ORAL 2 TIMES DAILY
Qty: 60 CAPSULE | Refills: 0 | Status: SHIPPED | OUTPATIENT
Start: 2021-12-28 | End: 2022-01-20 | Stop reason: SDUPTHER

## 2021-12-28 NOTE — CARE COORDINATION
[facility]. phoned patient today and found that she isn't feeling well following her 2nd COVID vaccine. She stated that is seems to be affecting her joints. HC also   inquired of patient referral for food and clothing. Patient stated that she didn't   request food, but did mention that she needs clothing for sure. HC shared with  the patient about the Clothing Closet at [facility], and that [facility]/Mikayla Diaz would  be willing to arrange a time to meet the patient there to search for clothing. Patient  was interested in meeting [facility]/Mikayla Diaz, when it's convenient for her. [facility]  will share information with the [facility], [facility]. and [facility]. will schedule transportation for patient  to [facility]. Patient did acknowledge that she receives food stamps  on the 10th of each month and that she has enough food to last until that time.     Plan of Care  [facility]. will follow up with [facility] for date to arrange transportation for the patient to [facility]

## 2021-12-29 ENCOUNTER — CARE COORDINATION (OUTPATIENT)
Dept: CARE COORDINATION | Age: 56
End: 2021-12-29

## 2021-12-29 NOTE — CARE COORDINATION
Initial Contact Social Work Note - Ambulatory  12/29/2021        Identified Needs:   Health concerns   Transportation       Social Work Plan:   [facility].  forwarded patient health concerns to [facility]  . HC will arrange for patients transportation to her appointment on     01/04/22 @ 2p to [address]  phone number [phone] Next Steps: HC will follow up with patient regarding her symptoms        Goals Addressed    None

## 2021-12-29 NOTE — CARE COORDINATION
Ambulatory Care Coordination Note  12/29/2021  CM Risk Score: 2  Charlson 10 Year Mortality Risk Score: 79%     ACC: Robson Brain    Summary Note: Jeff Faustin is c/o HA and sore joints after receiving her 2nd COVID vaccine. She reports the health department came to her home and gave her the vaccine. CM discussed with Jeff Faustin these symptoms are seen with COVID. It was explained her body recognizes the virus from her first injection and is reacting. CM discussed this as a good thing. CM encouraged Jeff Faustin to drink plenty of water and take acetaminophen or ibuprofen for the HA and joint pain. Jeff Faustin reports she had a low fasting blood sugar \"a couple days ago\" of 60. She reports she ate some breakfast food. She did not recheck her blood sugar 15 minutes later. She did not check her blood sugar this morning. She states she slept until 10:30 so she did not check it. CM encouraged her to check her fasting blood sugar in the morning regardless of the time. She expressed an understanding. Jeff Faustin reports she does not need food resources. She reports she needs clothing. She states she wears size 20 - 22 or 3 XL. She states she also needs a new pair of shoes size 11. She is agreeable for CM to f/u with the office concerning access and volumes/sizes of clothing in the clothes closet and get back with her. She expressed gratitude. Plan:  Reach out to the office for information concerning the clothes closet and sizes of the clothing. (done)  Reach out to Jeff Faustin next week concerning her request for clothing.         Care Coordination Interventions    Program Enrollment: Complex Care  Referral from Primary Care Provider: No  Suggested Interventions and Community Resources  Diabetes Education: Not Started  Grief Counselor: Not Started  Home Health Services: Completed (Comment: [address])  Medi Set or Pill Pack: Completed  Physical Therapy: Not Started  Registered Dietician: Completed (Comment: diabetic)  Social Work: Completed  Transportation Support: In Process  Zone Management Tools: Completed (Comment: DM)         Goals Addressed    None         Prior to Admission medications    Medication Sig Start Date End Date Taking? Authorizing Provider   pregabalin (LYRICA) 200 MG capsule Take 1 capsule by mouth 2 times daily for 30 days. 12/28/21 1/27/22  Damian Whipple MD   fluticasone (FLONASE) 50 MCG/ACT nasal spray 1 spray by Each Nostril route daily 12/22/21   Damian Whipple MD   acetaminophen (TYLENOL) 500 MG tablet Take 2 tablets by mouth 3 times daily as needed for Pain 12/22/21   Damian Whipple MD   dicyclomine (BENTYL) 10 MG capsule Take 1 capsule by mouth 4 times daily 12/17/21   Damian Whipple MD   metFORMIN (GLUCOPHAGE) 1000 MG tablet Take 1 tablet by mouth 2 times daily (with meals) 12/17/21   Damian Whipple MD   mirtazapine (REMERON) 7.5 MG tablet Take 1 tablet by mouth nightly 12/17/21   Damian Whipple MD   pantoprazole (PROTONIX) 20 MG tablet Take 1 tablet by mouth 2 times daily (before meals) 12/17/21   Damian Whipple MD   traZODone (DESYREL) 150 MG tablet Take 1 tablet by mouth nightly 12/17/21   Damian Whipple MD   venlafaxine (EFFEXOR XR) 150 MG extended release capsule Take 1 capsule by mouth daily (with breakfast) 12/17/21   Damian Whipple MD   insulin glargine (LANTUS SOLOSTAR) 100 UNIT/ML injection pen Inject 30 Units into the skin 2 times daily 11/15/21   Sheela Baugh MD   ibuprofen (ADVIL;MOTRIN) 800 MG tablet Take 1 tablet by mouth every 8 hours as needed for Pain 11/15/21 11/29/21  Sheela Baugh MD   Lancets MISC 1 each by Does not apply route 3 times daily 11/15/21   Sheela Baugh MD   Alcohol Swabs 70 % PADS Use 1 swab 3 times daily as needed 11/15/21   Sheela Baugh MD   blood glucose monitor strips Test 3 times a day & as needed for symptoms of irregular blood glucose. Dispense sufficient amount for indicated testing frequency plus additional to accommodate PRN testing needs. 11/8/21   Alem Camargo MD   Lancets MISC 1 each by Does not apply route 3 times daily 11/8/21   Alem Camargo MD   Insulin Pen Needle ([employer]ER PEN NEEDLES 31G) 31G X 8 MM MISC 1 each by Does not apply route daily 11/8/21   Audelia Muller MD   FREESTYLE LITE strip 1 each by Does not apply route 3 times daily 11/11/20   Zora Davis MD   albuterol sulfate  (90 Base) MCG/ACT inhaler Inhale 2 puffs into the lungs every 4 hours as needed for Wheezing 10/20/20 9/22/21  Zora Davis MD   FLOVENT  MCG/ACT inhaler Inhale 2 puffs into the lungs 2 times daily 10/20/20   Zora Davis MD   budesonide-formoterol (SYMBICORT) 80-4.5 MCG/ACT AERO Inhale 2 puffs into the lungs 2 times daily 10/20/20   Zora Davis MD       No future appointments.

## 2021-12-30 ENCOUNTER — TELEPHONE (OUTPATIENT)
Dept: FAMILY MEDICINE CLINIC | Age: 56
End: 2021-12-30

## 2021-12-30 ENCOUNTER — CARE COORDINATION (OUTPATIENT)
Dept: CARE COORDINATION | Age: 56
End: 2021-12-30

## 2021-12-30 NOTE — TELEPHONE ENCOUNTER
----- Message from Anastasiia Harrison MD sent at 12/28/2021  2:42 PM EST -----  Jimmy velasco,    Could you please inform the patient of the negative pap smear results.  Thanks!  ----- Message -----  From: Shoaib Serrano Incoming Lab Results From [facility]  Sent: 12/28/2021   6:27 AM EST  To: Anastasiia Harrison MD

## 2021-12-30 NOTE — CARE COORDINATION
[facility]. phoned [facility]'s Transportation and arranged for patients trip on 01/04/22  to [address] [place], [address], phone [phone]. Patient's confirmation # is [id number]. HC phoned and spoke to the patient and shared with  Her the travel arrangements for her appointment. HC informed the patient to be  ready @ 12;30p, even though transportation may not come at that time. , they will  only wait for five (5) minutes. HC will text the information to the patient, that she can access it for future reference.       Plan of Care  [facility] will follow up with patient for other resources

## 2021-12-31 NOTE — PROGRESS NOTES
Patient discharge to [facility] by Karissa Lieberman with all belongings. Report called to nurse at [facility].     Electronically signed by Michele Gastelum RN on 2/10/2021 at 9:11 AM checked nursing home notes and previous admission charts  no indication of goals of care, health care proxy nor advanced directive  F/U ADVANCED DIRECTIVES FROM NURSING HOME checked nursing home notes and previous admission charts  no indication of goals of care, health care proxy nor advanced directive  we called the patient's wife, Ms. Vane Chacon ([phone])  FULL CODE

## 2022-01-04 ENCOUNTER — CARE COORDINATION (OUTPATIENT)
Dept: CARE COORDINATION | Age: 57
End: 2022-01-04

## 2022-01-04 LAB — CYTOLOGY REPORT: NORMAL

## 2022-01-04 NOTE — CARE COORDINATION
Ambulatory Care Coordination Note  1/4/2022  CM Risk Score: 2  Charlson 10 Year Mortality Risk Score: 79%     ACC: Priteshshanikamarialuisa Mackenziejazmin    Summary Note: Mitzi Patient calls today to report she canceled her appt for today. She states she has \"fever and vomiting\". She states the last time she ate was yesterday at breakfast. She states she was able to keep diet Vernors and 7-Up down. She reports she drank some cranberry juice and it \"tore my stomach up\". She reports she did not take her insulin this morning d/t not being able to \"keep anything down\". She states she will try some chicken broth later today. She ended our conversation reporting \"I need to lay down\". She states she does not feel she needs to be seen in the ED. Plan:  F/U with Mitzi Patient this afternoon. Care Coordination Interventions    Program Enrollment: Complex Care  Referral from Primary Care Provider: No  Suggested Interventions and Community Resources  Diabetes Education: Not Started  Grief Counselor: Not Started  Home Health Services: Completed (Comment: [address])  Medi Set or Pill Pack: Completed  Physical Therapy: Not Started  Registered Dietician: Completed (Comment: diabetic)  Social Work: Completed  Transportation Support: In Process  Zone Management Tools: Completed (Comment: DM)         Goals Addressed    None         Prior to Admission medications    Medication Sig Start Date End Date Taking? Authorizing Provider   pregabalin (LYRICA) 200 MG capsule Take 1 capsule by mouth 2 times daily for 30 days.  12/28/21 1/27/22  Darcy Jaramillo MD   fluticasone (FLONASE) 50 MCG/ACT nasal spray 1 spray by Each Nostril route daily 12/22/21   Darcy Jaramillo MD   acetaminophen (TYLENOL) 500 MG tablet Take 2 tablets by mouth 3 times daily as needed for Pain 12/22/21   Darcy Jaramillo MD   dicyclomine (BENTYL) 10 MG capsule Take 1 capsule by mouth 4 times daily 12/17/21   Darcy Jaramillo MD   metFORMIN (GLUCOPHAGE) 1000 MG tablet Take 1 tablet by mouth 2 times daily (with meals) 12/17/21   Radha Cosme MD   mirtazapine (REMERON) 7.5 MG tablet Take 1 tablet by mouth nightly 12/17/21   Radha Cosme MD   pantoprazole (PROTONIX) 20 MG tablet Take 1 tablet by mouth 2 times daily (before meals) 12/17/21   Radha Cosme MD   traZODone (DESYREL) 150 MG tablet Take 1 tablet by mouth nightly 12/17/21   Radha Cosme MD   venlafaxine (EFFEXOR XR) 150 MG extended release capsule Take 1 capsule by mouth daily (with breakfast) 12/17/21   Radha Cosme MD   insulin glargine (LANTUS SOLOSTAR) 100 UNIT/ML injection pen Inject 30 Units into the skin 2 times daily 11/15/21   Dana Miller MD   ibuprofen (ADVIL;MOTRIN) 800 MG tablet Take 1 tablet by mouth every 8 hours as needed for Pain 11/15/21 11/29/21  Dana Miller MD   Lancets MISC 1 each by Does not apply route 3 times daily 11/15/21   Dana Miller MD   Alcohol Swabs 70 % PADS Use 1 swab 3 times daily as needed 11/15/21   Dana Miller MD   blood glucose monitor strips Test 3 times a day & as needed for symptoms of irregular blood glucose. Dispense sufficient amount for indicated testing frequency plus additional to accommodate PRN testing needs.  11/8/21   Hemal Ca MD   Lancets MISC 1 each by Does not apply route 3 times daily 11/8/21   Hemal Ca MD   Insulin Pen Needle ([employer]ER PEN NEEDLES 31G) 31G X 8 MM MISC 1 each by Does not apply route daily 11/8/21   Shannan West MD   FREESTYLE LITE strip 1 each by Does not apply route 3 times daily 11/11/20   Radha Cosme MD   albuterol sulfate  (90 Base) MCG/ACT inhaler Inhale 2 puffs into the lungs every 4 hours as needed for Wheezing 10/20/20 9/22/21  Radha Cosme MD   FLOVENT  MCG/ACT inhaler Inhale 2 puffs into the lungs 2 times daily 10/20/20   Radha Cosme MD   budesonide-formoterol (SYMBICORT) 80-4.5 MCG/ACT AERO Inhale 2 puffs into the lungs 2 times daily 10/20/20   Radha Cosme MD       No future

## 2022-01-04 NOTE — CARE COORDINATION
Patient phoned and left a message for [facility] stating that she wasn't feeling  well and needed to cancel her transportation to her appointment for today. HC phoned [facility]'s transportation and cancelled the patients transportation. HC then texted the patient and informed her that her message was received,  and that her transportation has been cancelled.     Plan of Care  [facility] will follow up with patients wellbeing on 01/05/22

## 2022-01-05 ENCOUNTER — CARE COORDINATION (OUTPATIENT)
Dept: CARE COORDINATION | Age: 57
End: 2022-01-05

## 2022-01-05 NOTE — CARE COORDINATION
HC attempted to follow up on patients wellbeing. There was no answer, HC left a message for patient.     Plan of Care  [facility]. will attempt to reach patient on 01/06/22

## 2022-01-05 NOTE — CARE COORDINATION
Ambulatory Care Coordination Note  1/5/2022  CM Risk Score: 2  Charlson 10 Year Mortality Risk Score: 79%     ACC: Dorothy Barreto    Summary Note: Barbara Solis reports she is feeling better today. She states she had toast for breakfast and it stayed down. She is drinking diet Vernors and 7 UP. She plans to have some chicken noodle soup for lunch. She is agreeable for CM to call her again tomorrow. Terri's appointment that was canceled yesterday will need to be rescheduled. It was an appointment for the treatment of lupus. Plan:  Call Barbara Solis for an update tomorrow. Care Coordination Interventions    Program Enrollment: Complex Care  Referral from Primary Care Provider: No  Suggested Interventions and Community Resources  Diabetes Education: Not Started  Grief Counselor: Not Started  Home Health Services: Completed (Comment: [address])  Medi Set or Pill Pack: Completed  Physical Therapy: Not Started  Registered Dietician: Completed (Comment: diabetic)  Social Work: Completed  Transportation Support: In Process  Zone Management Tools: Completed (Comment: DM)         Goals Addressed    None         Prior to Admission medications    Medication Sig Start Date End Date Taking? Authorizing Provider   pregabalin (LYRICA) 200 MG capsule Take 1 capsule by mouth 2 times daily for 30 days.  12/28/21 1/27/22  Sallyann Burkitt, MD   fluticasone (FLONASE) 50 MCG/ACT nasal spray 1 spray by Each Nostril route daily 12/22/21   Sallyann Burkitt, MD   acetaminophen (TYLENOL) 500 MG tablet Take 2 tablets by mouth 3 times daily as needed for Pain 12/22/21   Sallyann Burkitt, MD   dicyclomine (BENTYL) 10 MG capsule Take 1 capsule by mouth 4 times daily 12/17/21   Sallyann Burkitt, MD   metFORMIN (GLUCOPHAGE) 1000 MG tablet Take 1 tablet by mouth 2 times daily (with meals) 12/17/21   Sallyann Burkitt, MD   mirtazapine (REMERON) 7.5 MG tablet Take 1 tablet by mouth nightly 12/17/21   Sallyann Burkitt, MD   pantoprazole (PROTONIX) 20 MG tablet Take 1 tablet by mouth 2 times daily (before meals) 12/17/21   Abran Duval MD   traZODone (DESYREL) 150 MG tablet Take 1 tablet by mouth nightly 12/17/21   Abran Duval MD   venlafaxine (EFFEXOR XR) 150 MG extended release capsule Take 1 capsule by mouth daily (with breakfast) 12/17/21   Abran Duval MD   insulin glargine (LANTUS SOLOSTAR) 100 UNIT/ML injection pen Inject 30 Units into the skin 2 times daily 11/15/21   Michelle Michel MD   ibuprofen (ADVIL;MOTRIN) 800 MG tablet Take 1 tablet by mouth every 8 hours as needed for Pain 11/15/21 11/29/21  Michelle Michel MD   Lancets MISC 1 each by Does not apply route 3 times daily 11/15/21   Michelle Michel MD   Alcohol Swabs 70 % PADS Use 1 swab 3 times daily as needed 11/15/21   Michelle Michel MD   blood glucose monitor strips Test 3 times a day & as needed for symptoms of irregular blood glucose. Dispense sufficient amount for indicated testing frequency plus additional to accommodate PRN testing needs. 11/8/21   Charissa Chairez MD   Lancets MISC 1 each by Does not apply route 3 times daily 11/8/21   Charissa Chairez MD   Insulin Pen Needle (KR[employer]ER PEN NEEDLES 31G) 31G X 8 MM MISC 1 each by Does not apply route daily 11/8/21   Chris Heller MD   FREESTYLE LITE strip 1 each by Does not apply route 3 times daily 11/11/20   Abran Duval MD   albuterol sulfate  (90 Base) MCG/ACT inhaler Inhale 2 puffs into the lungs every 4 hours as needed for Wheezing 10/20/20 9/22/21  Abran Duval MD   FLOVENT  MCG/ACT inhaler Inhale 2 puffs into the lungs 2 times daily 10/20/20   Abran Duval MD   budesonide-formoterol (SYMBICORT) 80-4.5 MCG/ACT AERO Inhale 2 puffs into the lungs 2 times daily 10/20/20   Abran Duval MD       No future appointments.

## 2022-01-06 ENCOUNTER — CARE COORDINATION (OUTPATIENT)
Dept: CARE COORDINATION | Age: 57
End: 2022-01-06

## 2022-01-06 NOTE — CARE COORDINATION
[facility]. phoned patient and there was no answer.  left a message for the patient and provided the contact  Information for a return call.     Plan of Care  [facility]. will reach out to patient if no return call within a few days

## 2022-01-07 ENCOUNTER — CARE COORDINATION (OUTPATIENT)
Dept: CARE COORDINATION | Age: 57
End: 2022-01-07

## 2022-01-07 NOTE — CARE COORDINATION
Ambulatory Care Coordination Note  2022  CM Risk Score: 2  Charlson 10 Year Mortality Risk Score: 79%     ACC: Arvel Force    Summary Note: Tara Severance reports she is feeling \"better\" today. She states she continues to have \"body aches\". She reports she did not eat much yesterday after taking her insulin and had a blood sugar of 50 yesterday. She reports she is cleaned and cooked a potato making french fries. She states she has some chicken she palns to \"put in the oven\" for dinner. She states she has some pork and beans to have with the chicken. Tara Severance became very tearful and screaming stating \"I miss my family\". She is missing her  son,  and daughter. She does have a living daughter that she does not have a good relationship with. She is agreeable to reschedule an appointment with Dr. Jai Espinoza. She would like to  the clothing the front office staff from the office gathered from the clothes closet for her on Monday. CM let Tara Severance know she will reach out to [facility] concerning her transportation to the office to  the clothing. Plan:  Once the transportation is arranged, reach out to the office to let them know when to expect Tara Severance. Request the front office staff reschedule Terri with Dr. Jai Espinoza. She canceled her previous appointment d/t being admitted to [facility]. Care Coordination Interventions    Program Enrollment: Complex Care  Referral from Primary Care Provider: No  Suggested Interventions and Community Resources  Diabetes Education: Not Started  Grief Counselor: Not Started  Home Health Services: Completed (Comment:  [address])  Medi Set or Pill Pack: Completed  Physical Therapy: Not Started  Registered Dietician: Completed (Comment: diabetic)  Social Work: Completed  Transportation Support: In Process  Zone Management Tools: Completed (Comment: DM)         Goals Addressed    None         Prior to Admission medications    Medication Sig Start Date End Date Taking? Authorizing Provider   pregabalin (LYRICA) 200 MG capsule Take 1 capsule by mouth 2 times daily for 30 days. 12/28/21 1/27/22  Mayra Soler MD   fluticasone (FLONASE) 50 MCG/ACT nasal spray 1 spray by Each Nostril route daily 12/22/21   Mayra Soler MD   acetaminophen (TYLENOL) 500 MG tablet Take 2 tablets by mouth 3 times daily as needed for Pain 12/22/21   Mayra Soler MD   dicyclomine (BENTYL) 10 MG capsule Take 1 capsule by mouth 4 times daily 12/17/21   Mayra Soler MD   metFORMIN (GLUCOPHAGE) 1000 MG tablet Take 1 tablet by mouth 2 times daily (with meals) 12/17/21   Mayra Soler MD   mirtazapine (REMERON) 7.5 MG tablet Take 1 tablet by mouth nightly 12/17/21   Mayra Soler MD   pantoprazole (PROTONIX) 20 MG tablet Take 1 tablet by mouth 2 times daily (before meals) 12/17/21   Mayra Soler MD   traZODone (DESYREL) 150 MG tablet Take 1 tablet by mouth nightly 12/17/21   Mayra Soler MD   venlafaxine (EFFEXOR XR) 150 MG extended release capsule Take 1 capsule by mouth daily (with breakfast) 12/17/21   Mayra Soler MD   insulin glargine (LANTUS SOLOSTAR) 100 UNIT/ML injection pen Inject 30 Units into the skin 2 times daily 11/15/21   Douglas Rodriguez MD   ibuprofen (ADVIL;MOTRIN) 800 MG tablet Take 1 tablet by mouth every 8 hours as needed for Pain 11/15/21 11/29/21  Douglas Rodriguez MD   Lancets MISC 1 each by Does not apply route 3 times daily 11/15/21   Douglas Rodriguez MD   Alcohol Swabs 70 % PADS Use 1 swab 3 times daily as needed 11/15/21   Douglas Rodriguez MD   blood glucose monitor strips Test 3 times a day & as needed for symptoms of irregular blood glucose. Dispense sufficient amount for indicated testing frequency plus additional to accommodate PRN testing needs.  11/8/21   Daniella Martines MD   Lancets MISC 1 each by Does not apply route 3 times daily 11/8/21   Daniella Martines MD   Insulin Pen Needle (KROGER PEN NEEDLES 31G) 31G X 8 MM MISC 1 each by Does not apply route daily 11/8/21   Ivonne Sanders MD   FREESTYLE LITE strip 1 each by Does not apply route 3 times daily 11/11/20   Flori Ibarra MD   albuterol sulfate  (90 Base) MCG/ACT inhaler Inhale 2 puffs into the lungs every 4 hours as needed for Wheezing 10/20/20 9/22/21  Flori Ibarra MD   FLOVENT  MCG/ACT inhaler Inhale 2 puffs into the lungs 2 times daily 10/20/20   Flori Ibarra MD   budesonide-formoterol (SYMBICORT) 80-4.5 MCG/ACT AERO Inhale 2 puffs into the lungs 2 times daily 10/20/20   Flori Ibarra MD       No future appointments.

## 2022-01-07 NOTE — CARE COORDINATION
[facility] spoke with patient today to follow up on her wellbeing and also to arrange for her travel to [facility]  on Tuesday, 01/11/22 for a 10a appointment with the office staff, for clothing items. Arrangements   have been made with [facility], and the patient should arrive @ [facility] by 9:45 a for  this 10a appointment. Patient will call for her return ride with [facility] [employer]. Patients  trip # is [id number]. This information will be texted to the patient.       [facility]  [facility] will follow up with patient on 01/10/22

## 2022-01-10 ENCOUNTER — CARE COORDINATION (OUTPATIENT)
Dept: CARE COORDINATION | Age: 57
End: 2022-01-10

## 2022-01-10 NOTE — CARE COORDINATION
Nutrition Care Coordinator Follow-Up visit:    Food Recall: eating 2-3 meals/d    Activity Level:  Sedentary: X  Lightly Active: Moderately Active:  Very Active:    Adult BMI:  Underweight (below 18.5)  Normal Weight (18.5-24.9)  Overweight (25-29. 9)  Obese (30-39. 9) X  Morbidly Obese (>40)    Weight Change: weight was up 10# over past 2 months    Plan:  Plan was established with patient:  Increase dietary fiber by consuming whole grains, fruits and vegetables: X  Limit dietary cholesterol to >200mg/day: Increase water intake:  Avoid added sugar: X  Avoid sweetened beverages: X  Choose lean meats:      Monitoring: Will monitor weight:  Will monitor adherence to meal plan: Will monitor adherence to exercise plan: Will monitor HGA1c: X    Handouts Provided :  Low Carb snacking: X  Carb counting /individual meal plan:  Portion Control: X  Food Labels:  Physical Activity:  Low Fat/Cholesterol:  Hypo/Hyperglycemia:  Calorie Controlled Meal Plan:    Goals: Increase water consumption to 8oz. 6-8 times daily:  Manage blood sugars by consuming 3 meals spaced every 4-5 hours with 2-3 snacks daily: reviewed  Increase fiber and decrease fat intake by consuming 1-2 fruit servings and 2-3 vegetable servings per day. Increase physical activity by: encouraged daily walking as tolerated  Consume less than 2,000mg of sodium/day  Avoid consumption of sweetened beverages and added sugar by reading food labels:reviewed  Monitor blood sugars by using meter to check blood glucose before morning meal and 2 hours after a meal daily:BS- patient was not at home could not give me readings  Decrease risk of coronary heart disease by consuming fish that contains omega-3 fatty acids at least twice a week, avoiding partially hydrogenated oil/trans fats and limiting saturated fat intake by reading food labels:    Patient goals set:  1.  Reviewed importance of meal pattern, patient relays she does eat 2-3 meals/day-still skipping breakfast  at times.  Quick/easy breakfast suggestions discussed, will send patient a list of suggestions. Patient states she does not sleep well some nights so meal times are off- encouraged to work on sticking with a meal schedule as mush as possible. Goal is 3 meals daily spaced every 4-5 hours. 2. Reviewed what foods contain carbohydrate to limit and how to measure out portions. Encouraged patient to limit carb intake to 45-60gms/meal, 15gms/snack. We focused on what a serving is, patient states she know she is over eating at times, loves potatoes, rice and cereal- encouraged to measure servings out using a measuring cup. Patient admits snacks often in the evening, discussed low carb snacking. 3. Reviewed plate method, encouraged patient to increase intake of non-starchy vegetables.  Goal is 1/2 of  plate to be non-starchy vegetables, 1/4 lean protein, 1/4 carbs. Foods from each category reviewed along with what a serving size is.   4. Reviewed avoiding/limiting sweets and sweetened drinks. Patient admits she was still drinking Pepsi and orange juice daily. Discussed alternatives and how sugary drinks elevate blood sugars. Encouraged water and zero calorie beverages. Patient admits she was still drinking Pepsi, did buy diet Pepsi today- encouraged water as much as possible and reviewed cutting out all sugary drinks. Patient did get nutrition information and states found it helpful- encouraged to review once a week for reminders of foods to eat/ foods to limit. Will follow up in 3-4 weeks to review and answer questions.   Guerita Riddle

## 2022-01-10 NOTE — CARE COORDINATION
Spoke with patient who relays she is at the grocery store and   Requests I call her back later today. Will attempt to reach patient  Later this afternoon.

## 2022-01-10 NOTE — CARE COORDINATION
Phoned patient to follow up with her wellbeing, patient mentioned that she's able to walk again. She is pleased with her health and strength returning. Patient mentioned that she had gone   grocery shopping, and that she was very low on groceries. She stated that she asked a neighbor  to take her, and was very thankful for the assistance. Patient confirmed that she will be ready for   her transportation to [facility] on 01/11/22. HC reminded patient that the information is in a text message  that was sent to her on 01/07/22. Patient stated that she will review the text message from 01/07/22. Patient had to go because of another call.       Plan of Care  [facility] OF [place], [facility]. will follow up with patient after her appointment @ [facility] for clothing

## 2022-01-11 ENCOUNTER — CARE COORDINATION (OUTPATIENT)
Dept: CARE COORDINATION | Age: 57
End: 2022-01-11

## 2022-01-11 NOTE — CARE COORDINATION
HC attempted to contact patient, to follow up on her No-Show today @ [facility]. Natacha Bryant There was no answer today and HC was unable to leave a message.       Plan of Care  [facility] OF [place], [facility]. will try to reach the patient on 01/12/22

## 2022-01-11 NOTE — CARE COORDINATION
Flaquita Alfonso was a no show at her PCP office this morning to p/u clothing she said she needed. Plan:  Attempt to reach Flaquita Alfonso again tomorrow.

## 2022-01-12 ENCOUNTER — CARE COORDINATION (OUTPATIENT)
Dept: CARE COORDINATION | Age: 57
End: 2022-01-12

## 2022-01-13 ENCOUNTER — CARE COORDINATION (OUTPATIENT)
Dept: CARE COORDINATION | Age: 57
End: 2022-01-13

## 2022-01-13 NOTE — CARE COORDINATION
HC attempted to reach the patient again. There was no answer, and the [employer] recording again stated  that the patient wasn't available.     Plan of Care  [facility] OF [place], [facility]. will continue to reach out to patient

## 2022-01-14 ENCOUNTER — CARE COORDINATION (OUTPATIENT)
Dept: CARE COORDINATION | Age: 57
End: 2022-01-14

## 2022-01-14 NOTE — CARE COORDINATION
Ambulatory Care Coordination Note  1/14/2022  CM Risk Score: 2  Charlson 10 Year Mortality Risk Score: 79%     ACC: Nikos Robin    Summary Note: GORDON has not been able to reach Bridgette by phone since Tuesday. She was a no show to p/u some clothing from her PCP office on Tuesday. TC to the [facility]. The female who answered the phone reports Bridgette told her she does not have a phone. The female reported it is either malfunctioning or she did not pay her bill. She states she can not enter her apartment. She reports \"her manager\", Terri's manager is out of the office until Tuesday. She states GORDON will need to call the police to do a safety check if needed before Tuesday. Plan:  Reach out to Terri's manager on Tuesday if not able to connect with Bridgette by phone on Monday. Care Coordination Interventions    Program Enrollment: Complex Care  Referral from Primary Care Provider: No  Suggested Interventions and Community Resources  Diabetes Education: Not Started  Grief Counselor: Not Started  Home Health Services: Completed (Comment: [address])  Medi Set or Pill Pack: Completed  Physical Therapy: Not Started  Registered Dietician: Completed (Comment: diabetic)  Social Work: Completed  Transportation Support: In Process  Zone Management Tools: Completed (Comment: DM)         Goals Addressed    None         Prior to Admission medications    Medication Sig Start Date End Date Taking? Authorizing Provider   pregabalin (LYRICA) 200 MG capsule Take 1 capsule by mouth 2 times daily for 30 days.  12/28/21 1/27/22  Gabe Gann MD   fluticasone (FLONASE) 50 MCG/ACT nasal spray 1 spray by Each Nostril route daily 12/22/21   Gabe Gann MD   acetaminophen (TYLENOL) 500 MG tablet Take 2 tablets by mouth 3 times daily as needed for Pain 12/22/21   Gabe Gann MD   dicyclomine (BENTYL) 10 MG capsule Take 1 capsule by mouth 4 times daily 12/17/21   Gabe Gann MD   metFORMIN (GLUCOPHAGE) 1000 MG tablet Take 1 tablet by mouth 2 times daily (with meals) 12/17/21   Carlos Manuel Zhang MD   mirtazapine (REMERON) 7.5 MG tablet Take 1 tablet by mouth nightly 12/17/21   Carlos Manuel Zhang MD   pantoprazole (PROTONIX) 20 MG tablet Take 1 tablet by mouth 2 times daily (before meals) 12/17/21   Carlos Manuel Zhang MD   traZODone (DESYREL) 150 MG tablet Take 1 tablet by mouth nightly 12/17/21   Carlos Manuel Zhang MD   venlafaxine (EFFEXOR XR) 150 MG extended release capsule Take 1 capsule by mouth daily (with breakfast) 12/17/21   Carlos Manuel Zhang MD   insulin glargine (LANTUS SOLOSTAR) 100 UNIT/ML injection pen Inject 30 Units into the skin 2 times daily 11/15/21   Sonia Duran MD   ibuprofen (ADVIL;MOTRIN) 800 MG tablet Take 1 tablet by mouth every 8 hours as needed for Pain 11/15/21 11/29/21  Sonia Duran MD   Lancets MISC 1 each by Does not apply route 3 times daily 11/15/21   Sonia Duran MD   Alcohol Swabs 70 % PADS Use 1 swab 3 times daily as needed 11/15/21   Sonia Duran MD   blood glucose monitor strips Test 3 times a day & as needed for symptoms of irregular blood glucose. Dispense sufficient amount for indicated testing frequency plus additional to accommodate PRN testing needs.  11/8/21   Jessica Hernandez MD   Lancets MISC 1 each by Does not apply route 3 times daily 11/8/21   Jessica Hernandez MD   Insulin Pen Needle ([employer] PEN NEEDLES 31G) 31G X 8 MM MISC 1 each by Does not apply route daily 11/8/21   Ya Peñaloza MD   FREESTYLE LITE strip 1 each by Does not apply route 3 times daily 11/11/20   Carlos Manuel Zhang MD   albuterol sulfate  (90 Base) MCG/ACT inhaler Inhale 2 puffs into the lungs every 4 hours as needed for Wheezing 10/20/20 9/22/21  Carlos Manuel Zhang MD   FLOVENT  MCG/ACT inhaler Inhale 2 puffs into the lungs 2 times daily 10/20/20   Carlos Manuel Zhang MD   budesonide-formoterol (SYMBICORT) 80-4.5 MCG/ACT AERO Inhale 2 puffs into the lungs 2 times daily 10/20/20   Carlos Manuel Zhang MD       No future appointments.

## 2022-01-17 ENCOUNTER — CARE COORDINATION (OUTPATIENT)
Dept: CARE COORDINATION | Age: 57
End: 2022-01-17

## 2022-01-17 NOTE — CARE COORDINATION
[facility] stating that she has been ill, starting to feel better. Patient also mentioned that her phone was off due to non payment, and that someone  got into her checking account and she has no money. According to the patient the bank  is checking into the situation. She reports that she had to borrow money to pay her phone bill. Patient had much conversation and mentioned that she will be starting a job with her brother as well as planning to start  school in the near future.     [facility]  [facility] will consult ACM regarding arranging another appointment at [facility], as well as transportation

## 2022-01-18 ENCOUNTER — CARE COORDINATION (OUTPATIENT)
Dept: CARE COORDINATION | Age: 57
End: 2022-01-18

## 2022-01-18 NOTE — CARE COORDINATION
Ambulatory Care Coordination Note  1/18/2022   Risk Score: 2  Charlson 10 Year Mortality Risk Score: 79%     ACC: Marcelo Polfransisco    Summary Note: Neha Maddox called  to discuss her recent phone encounter with Bina. She reports Bina was very excitable. Neha Maddox reports Bina stated she has a wound to the sole of one of her feet. She reported to Neha Maddox she had taken Tylenol, ibuprofen and a muscle relaxer at once for relief from the pain to her foot.  attempted to speak with Bina and get her scheduled to be seen for her foot. Terri's phone went to .  left requesting a return call to [phone]. Plan:  Reach out to Bina again this afternoon if no return call. Care Coordination Interventions    Program Enrollment: Complex Care  Referral from Primary Care Provider: No  Suggested Interventions and Community Resources  Diabetes Education: Not Started  Grief Counselor: Not Started  Home Health Services: Completed (Comment: [address])  Medi Set or Pill Pack: Completed  Physical Therapy: Not Started  Registered Dietician: Completed (Comment: diabetic)  Social Work: Completed  Transportation Support: In Process  Zone Management Tools: Completed (Comment: DM)         Goals Addressed    None         Prior to Admission medications    Medication Sig Start Date End Date Taking? Authorizing Provider   pregabalin (LYRICA) 200 MG capsule Take 1 capsule by mouth 2 times daily for 30 days.  12/28/21 1/27/22  Vicky Nicole MD   fluticasone (FLONASE) 50 MCG/ACT nasal spray 1 spray by Each Nostril route daily 12/22/21   Vicky Nicole MD   acetaminophen (TYLENOL) 500 MG tablet Take 2 tablets by mouth 3 times daily as needed for Pain 12/22/21   Vicky Nicole MD   dicyclomine (BENTYL) 10 MG capsule Take 1 capsule by mouth 4 times daily 12/17/21   Vicky Nicole MD   metFORMIN (GLUCOPHAGE) 1000 MG tablet Take 1 tablet by mouth 2 times daily (with meals) 12/17/21   Vicky Nicole MD   mirtazapine (REMERON) 7.5 MG tablet Take 1 tablet by mouth nightly 12/17/21   Vicky Nicole MD   pantoprazole (PROTONIX) 20 MG tablet Take 1 tablet by mouth 2 times daily (before meals) 12/17/21   Vicky Nicole MD   traZODone (DESYREL) 150 MG tablet Take 1 tablet by mouth nightly 12/17/21   Vicky Nicole MD   venlafaxine (EFFEXOR XR) 150 MG extended release capsule Take 1 capsule by mouth daily (with breakfast) 12/17/21   Vicky Nicole MD   insulin glargine (LANTUS SOLOSTAR) 100 UNIT/ML injection pen Inject 30 Units into the skin 2 times daily 11/15/21   Sadia Al MD   ibuprofen (ADVIL;MOTRIN) 800 MG tablet Take 1 tablet by mouth every 8 hours as needed for Pain 11/15/21 11/29/21  Sadia Al MD   Lancets MISC 1 each by Does not apply route 3 times daily 11/15/21   Sadia Al MD   Alcohol Swabs 70 % PADS Use 1 swab 3 times daily as needed 11/15/21   Sadia Al MD   blood glucose monitor strips Test 3 times a day & as needed for symptoms of irregular blood glucose. Dispense sufficient amount for indicated testing frequency plus additional to accommodate PRN testing needs. 11/8/21   Callum Carvajal MD   Lancets MISC 1 each by Does not apply route 3 times daily 11/8/21   Callum Carvajal MD   Insulin Pen Needle (KROGER PEN NEEDLES 31G) 31G X 8 MM MISC 1 each by Does not apply route daily 11/8/21   Niki Newman MD   FREESTYLE LITE strip 1 each by Does not apply route 3 times daily 11/11/20   Vicky Nicole MD   albuterol sulfate  (90 Base) MCG/ACT inhaler Inhale 2 puffs into the lungs every 4 hours as needed for Wheezing 10/20/20 9/22/21  Vicky Nicole MD   FLOVENT  MCG/ACT inhaler Inhale 2 puffs into the lungs 2 times daily 10/20/20   Vicky Nicole MD   budesonide-formoterol (SYMBICORT) 80-4.5 MCG/ACT AERO Inhale 2 puffs into the lungs 2 times daily 10/20/20   Vicky Nicole MD       No future appointments.

## 2022-01-18 NOTE — CARE COORDINATION
Ambulatory Care Coordination Note  1/18/2022  CM Risk Score: 2  Charlson 10 Year Mortality Risk Score: 79%     ACC: Shantell Monday    Summary Note: Jordan Velázquez reports she stepped on glass and cut the sole of her R foot. She states she got the glass out of her foot. She states she has been keeping the wound clean, dry and using Neosporin. She reports her fasting blood sugar this morning was 99. She reports she is eating 3+ meals a day. She does report she could use some groceries. She reports she no longer has the phone number for the food pantry she had from [address] [place] [address] . She would like the phone number faxed to her. Plan:  Reach out to [facility] and request she text the phone number to Jordan Patel Jacque to call and schedule herself an appointment with her PCP to have her foot looked at. F/U on appointment date tomorrow. Care Coordination Interventions    Program Enrollment: Complex Care  Referral from Primary Care Provider: No  Suggested Interventions and Community Resources  Diabetes Education: Not Started  Grief Counselor: Not Started  Home Health Services: Completed (Comment: [address])  Medi Set or Pill Pack: Completed  Physical Therapy: Not Started  Registered Dietician: Completed (Comment: diabetic)  Social Work: Completed  Transportation Support: In Process  Zone Management Tools: Completed (Comment: DM)         Goals Addressed    None         Prior to Admission medications    Medication Sig Start Date End Date Taking? Authorizing Provider   pregabalin (LYRICA) 200 MG capsule Take 1 capsule by mouth 2 times daily for 30 days.  12/28/21 1/27/22  Sam Wade MD   fluticasone (FLONASE) 50 MCG/ACT nasal spray 1 spray by Each Nostril route daily 12/22/21   Sam Wade MD   acetaminophen (TYLENOL) 500 MG tablet Take 2 tablets by mouth 3 times daily as needed for Pain 12/22/21   Sam Wade MD   dicyclomine (BENTYL) 10 MG capsule Take 1 capsule by mouth 4 times daily 12/17/21   Sam Wade MD   metFORMIN (GLUCOPHAGE) 1000 MG tablet Take 1 tablet by mouth 2 times daily (with meals) 12/17/21   Carlton Brush MD   mirtazapine (REMERON) 7.5 MG tablet Take 1 tablet by mouth nightly 12/17/21   Carlton Brush MD   pantoprazole (PROTONIX) 20 MG tablet Take 1 tablet by mouth 2 times daily (before meals) 12/17/21   Carlton Brush MD   traZODone (DESYREL) 150 MG tablet Take 1 tablet by mouth nightly 12/17/21   Carlton Brush MD   venlafaxine (EFFEXOR XR) 150 MG extended release capsule Take 1 capsule by mouth daily (with breakfast) 12/17/21   Carlton Brush MD   insulin glargine (LANTUS SOLOSTAR) 100 UNIT/ML injection pen Inject 30 Units into the skin 2 times daily 11/15/21   Meng Hills MD   ibuprofen (ADVIL;MOTRIN) 800 MG tablet Take 1 tablet by mouth every 8 hours as needed for Pain 11/15/21 11/29/21  Meng Hills MD   Lancets MISC 1 each by Does not apply route 3 times daily 11/15/21   Meng Hills MD   Alcohol Swabs 70 % PADS Use 1 swab 3 times daily as needed 11/15/21   Meng Hills MD   blood glucose monitor strips Test 3 times a day & as needed for symptoms of irregular blood glucose. Dispense sufficient amount for indicated testing frequency plus additional to accommodate PRN testing needs.  11/8/21   Nicole Tabares MD   Lancets MISC 1 each by Does not apply route 3 times daily 11/8/21   Nicole Tabares MD   Insulin Pen Needle (KROGER PEN NEEDLES 31G) 31G X 8 MM MISC 1 each by Does not apply route daily 11/8/21   Imtiaz Montiel MD   FREESTYLE LITE strip 1 each by Does not apply route 3 times daily 11/11/20   Carlton Brush MD   albuterol sulfate  (90 Base) MCG/ACT inhaler Inhale 2 puffs into the lungs every 4 hours as needed for Wheezing 10/20/20 9/22/21  Carlton Brush MD   FLOVENT  MCG/ACT inhaler Inhale 2 puffs into the lungs 2 times daily 10/20/20   Carlton Brush MD   budesonide-formoterol (SYMBICORT) 80-4.5 MCG/ACT AERO Inhale 2 puffs into the lungs 2 times daily 10/20/20   Mayra Soler MD       No future appointments.

## 2022-01-18 NOTE — CARE COORDINATION
HC phoned and spoke with patient, encouraged her to contact [facility]/  Tamara Schwarz. Patient had a lot of conversation and wanted to continue on  and [facility]. again encouraged patient to contact [facility].     Plan of Care  [facility] will follow up with patient in a few days

## 2022-01-19 ENCOUNTER — CARE COORDINATION (OUTPATIENT)
Dept: CARE COORDINATION | Age: 57
End: 2022-01-19

## 2022-01-19 NOTE — CARE COORDINATION
Initial Contact Social Work Note - Ambulatory  1/19/2022        Identified Needs:   Transportation        Social Work Plan:   [facility]. arranged for patients transportation to [facility] on 01/20/22 @ 8:30a. Patient will be picked up around 7:30a by [facility] Next Steps:  [facility]. will follow up with patient on 01/20/22          Goals Addressed                 This Visit's Progress       Care Coordination     Community Resource Goal   Improving     Patient states that she needs medical transportation and also has food insecurities. Barriers: fear of failure, lack of motivation, financial, lack of support, overwhelmed by complexity of regimen, stress, time constraints, medication side effects, and lack of education    Plan for overcoming my barriers: [facility] will assist patient with her medical transportation as needed  [facility] will refer patient to [address] [place] [address] for assistance with getting addition foods in her home.     Confidence: 9/10    Anticipated Goal Completion Date: 08/30/21

## 2022-01-20 ENCOUNTER — OFFICE VISIT (OUTPATIENT)
Dept: FAMILY MEDICINE CLINIC | Age: 57
DRG: 364 | End: 2022-01-20
Payer: COMMERCIAL

## 2022-01-20 VITALS
HEART RATE: 86 BPM | DIASTOLIC BLOOD PRESSURE: 71 MMHG | BODY MASS INDEX: 40.27 KG/M2 | SYSTOLIC BLOOD PRESSURE: 118 MMHG | TEMPERATURE: 97.9 F | WEIGHT: 242 LBS

## 2022-01-20 DIAGNOSIS — S91.302A WOUND OF LEFT FOOT: Primary | ICD-10-CM

## 2022-01-20 DIAGNOSIS — Z13.220 SCREENING FOR HYPERLIPIDEMIA: ICD-10-CM

## 2022-01-20 DIAGNOSIS — Z12.31 ENCOUNTER FOR SCREENING MAMMOGRAM FOR BREAST CANCER: ICD-10-CM

## 2022-01-20 DIAGNOSIS — G62.9 NEUROPATHY: ICD-10-CM

## 2022-01-20 DIAGNOSIS — E11.69 DIABETES MELLITUS TYPE 2 IN OBESE (HCC): ICD-10-CM

## 2022-01-20 DIAGNOSIS — E66.9 DIABETES MELLITUS TYPE 2 IN OBESE (HCC): ICD-10-CM

## 2022-01-20 DIAGNOSIS — Z00.00 HEALTH MAINTENANCE EXAMINATION: ICD-10-CM

## 2022-01-20 LAB — HBA1C MFR BLD: 10 %

## 2022-01-20 PROCEDURE — G8417 CALC BMI ABV UP PARAM F/U: HCPCS

## 2022-01-20 PROCEDURE — 1036F TOBACCO NON-USER: CPT

## 2022-01-20 PROCEDURE — 83036 HEMOGLOBIN GLYCOSYLATED A1C: CPT

## 2022-01-20 PROCEDURE — 99211 OFF/OP EST MAY X REQ PHY/QHP: CPT

## 2022-01-20 PROCEDURE — G8484 FLU IMMUNIZE NO ADMIN: HCPCS

## 2022-01-20 PROCEDURE — 2022F DILAT RTA XM EVC RTNOPTHY: CPT

## 2022-01-20 PROCEDURE — 3017F COLORECTAL CA SCREEN DOC REV: CPT

## 2022-01-20 PROCEDURE — 3046F HEMOGLOBIN A1C LEVEL >9.0%: CPT

## 2022-01-20 PROCEDURE — 99213 OFFICE O/P EST LOW 20 MIN: CPT

## 2022-01-20 PROCEDURE — G8427 DOCREV CUR MEDS BY ELIG CLIN: HCPCS

## 2022-01-20 RX ORDER — DOXYCYCLINE HYCLATE 100 MG
100 TABLET ORAL 2 TIMES DAILY
Qty: 14 TABLET | Refills: 0 | Status: ON HOLD | OUTPATIENT
Start: 2022-01-20 | End: 2022-02-03 | Stop reason: HOSPADM

## 2022-01-20 RX ORDER — PREGABALIN 200 MG/1
200 CAPSULE ORAL 2 TIMES DAILY
Qty: 60 CAPSULE | Refills: 0 | Status: SHIPPED | OUTPATIENT
Start: 2022-01-20 | End: 2022-02-09 | Stop reason: SDUPTHER

## 2022-01-20 RX ORDER — INSULIN GLARGINE 100 [IU]/ML
30 INJECTION, SOLUTION SUBCUTANEOUS 2 TIMES DAILY
Qty: 5 PEN | Refills: 5 | Status: SHIPPED | OUTPATIENT
Start: 2022-01-20 | End: 2022-03-15 | Stop reason: SDUPTHER

## 2022-01-20 ASSESSMENT — ENCOUNTER SYMPTOMS
ABDOMINAL DISTENTION: 0
ABDOMINAL PAIN: 0
BACK PAIN: 0
NAUSEA: 0
VOMITING: 0
DIARRHEA: 0
EYE PAIN: 0
COUGH: 0
SORE THROAT: 0
SHORTNESS OF BREATH: 0
WHEEZING: 0
RHINORRHEA: 0

## 2022-01-20 NOTE — PATIENT INSTRUCTIONS
Thank you for letting us take care of you today. We hope all your questions were addressed. If a question was overlooked or something else comes to mind after you return home, please contact a member of your Care Team listed below. Your Care Team at [place] [address] is Team #4  Cecille Olszewski, MD (Faculty)  Maxwell Cuellar MD (Resident)  Bebe Byrd MD (Resident)  Jolanta Jordan MD (Resident)  Christi Mandujano MD (Resident)  MAREK Pandey,MATTHEW Rhoades., [facility] office)  Lucinda Ramirez, [address] (Clinical Practice Manager)  Hernandez Reyes, [facility] (Clinical Pharmacist)       Office phone number: [phone]    If you need to get in right away due to illness, please be advised we have \"Same Day\" appointments available Monday-Friday. Please call us at [phone] option #3 to schedule your \"Same Day\" appointment.

## 2022-01-20 NOTE — PROGRESS NOTES
Visit Information    Have you changed or started any medications since your last visit including any over-the-counter medicines, vitamins, or herbal medicines? no   Have you stopped taking any of your medications? Is so, why? -  no  Are you having any side effects from any of your medications? - no    Have you seen any other physician or provider since your last visit?  no   Have you had any other diagnostic tests since your last visit?  no   Have you been seen in the emergency room and/or had an admission in a hospital since we last saw you?  no   Have you had your routine dental cleaning in the past 6 months?  no     Do you have an active MyChart account? If no, what is the barrier?   No:     Patient Care Team:  Sushila Mejia MD as PCP - General (Family Medicine)  Arsen Luevano MD as Consulting Physician (Infectious Diseases)  Oral Felix MD as Consulting Physician (Infectious Diseases)  Kyle Navarrete as [address] as Health   Lamont Garcia RD, LD as Dietitian    Medical History Review  Past Medical, Family, and Social History reviewed and does not contribute to the patient presenting condition    Health Maintenance   Topic Date Due    Pneumococcal 0-64 years Vaccine (1 of 2 - PPSV23) Never done    Depression Monitoring  Never done    Diabetic retinal exam  Never done    Hepatitis B vaccine (1 of 3 - Risk 3-dose series) Never done    Colon cancer screen colonoscopy  Never done    Breast cancer screen  Never done    Shingles Vaccine (1 of 2) Never done    Flu vaccine (1) Never done    Diabetic microalbuminuria test  10/20/2021    Lipid screen  12/30/2021    Diabetic foot exam  01/22/2022    A1C test (Diabetic or Prediabetic)  03/13/2022    COVID-19 Vaccine (3 - Booster for Pfizer series) 06/22/2022    Cervical cancer screen  12/22/2026    DTaP/Tdap/Td vaccine (4 - Td or Tdap) 09/14/2031    Hepatitis C screen  Completed    HIV screen  Completed    Hepatitis A vaccine  Aged Out    Hib vaccine  Aged Out    Meningococcal (ACWY) vaccine  Aged Out

## 2022-01-20 NOTE — PROGRESS NOTES
Attending Physician Statement  I have discussed the care of Roula Quiroz, 49 y.o. female,including pertinent history and exam findings,  with the resident Dr. Berta Fisher MD.  History:  Chief Complaint   Patient presents with    1 Month Follow-Up     patient states her blood sugar is getting better    Foot Pain     patient states having a sore on right foot     Patient is here to discuss right foot erythema, pain and swelling. She reports stepping on a piece of glass that she extracted. I have reviewed the key elements of the encounter with the resident. Examination was done by resident as documented in residents note. BP Readings from Last 3 Encounters:   01/20/22 118/71   12/22/21 (!) 149/89   11/15/21 135/88     /71 (Site: Left Upper Arm, Position: Sitting, Cuff Size: Large Adult)   Pulse 86   Temp 97.9 °F (36.6 °C) (Temporal)   Wt 242 lb (109.8 kg)   LMP  (LMP Unknown)   BMI 40.27 kg/m²   Lab Results   Component Value Date    WBC 5.3 11/08/2021    HGB 9.8 (L) 11/08/2021    HCT 30.7 (L) 11/08/2021     11/08/2021    CHOL 275 (H) 12/30/2020    TRIG 246 (H) 12/30/2020    HDL 80 12/30/2020    ALT 11 10/08/2021    AST 16 10/08/2021     11/08/2021    K 4.3 11/08/2021     11/08/2021    CREATININE 0.67 11/08/2021    BUN 9 11/08/2021    CO2 20 11/08/2021    TSH 0.82 09/16/2021    LABA1C 10.0 01/20/2022    LABMICR 20 10/20/2020     Lab Results   Component Value Date    CALCIUM 8.5 (L) 11/08/2021    PHOS 3.2 11/06/2021     Lab Results   Component Value Date    LDLCHOLESTEROL 146 (H) 12/30/2020     I agree with the assessment, plan and diagnosis of    Diagnosis Orders   1. Wound of right foot  XR FOOT RIGHT (MIN 3 VIEWS)    [facility]    Cellulitis   2. Health maintenance examination  POCT glycosylated hemoglobin (Hb A1C)   3. Neuropathy  pregabalin (LYRICA) 200 MG capsule   4.  Diabetes mellitus type 2 in obese (HCC)  insulin glargine (LANTUS SOLOSTAR) 100 UNIT/ML injection pen     DIABETES FOOT EXAM    Microalbumin, Ur   5. Encounter for screening mammogram for breast cancer     6. Screening for hyperlipidemia  Lipid Panel     I agree with  orders as documented by the resident. Recommendations:   Clinical presentation and physical exam are consistent with clean wound and cellulitis. Patient counseled regarding local wound care and started on Doxycycline. She was advised to return for recheck in short term. Diabetic regimen and supplies refilled and labs updated. Return in about 1 week (around 1/27/2022) for Right foot wound and cellulitis.    (Tomás Palafox ) Dr. Radha Kingsley MD

## 2022-01-20 NOTE — PROGRESS NOTES
Subjective:    Marilee Corral is a 49 y.o. female with  has a past medical history of Acid reflux, Acute cystitis with hematuria, Acute non-recurrent maxillary sinusitis, Asthma, Bipolar 1 disorder ([address] Bipolar disorder, mixed ([address] BMI 34.0-34.9,adult, Cannabis use disorder, severe, dependence ([address] Cerebrovascular accident (CVA) ([address] Chest pain, Chronic renal insufficiency, Cocaine abuse ([address] COVID-19 virus RNA test result unknown, DDD (degenerative disc disease), cervical, Diabetes mellitus ([address] Dizziness, Fibromyalgia, History of stroke, Homicidal ideation, Hyperglycemia, Hypertension, Hypotension, IDDM (insulin dependent diabetes mellitus), Lupus ([address] Migraine, Neuropathy, Neuropathy, Polysubstance abuse ([address] Post traumatic stress disorder (PTSD), Posttraumatic stress disorder, Recurrent depression ([address] Recurrent major depressive disorder, in partial remission ([address] Screening mammogram, encounter for, Severe recurrent major depression with psychotic features ([address] Severe recurrent major depression without psychotic features ([address] Stroke (cerebrum) ([address] Stroke ([address] Suicidal ideation, Suicidal intent, Vitamin D deficiency, and White matter changes. Presented to the office today for:  Chief Complaint   Patient presents with    1 Month Follow-Up     patient states her blood sugar is getting better    Foot Pain     patient states having a sore on right foot       HPI  This is a 49years old female with Hx of diabetes type 2 presented to the office today with a complaint of right foot swelling and pain. She stepped on small glass 4 days ago which created 0.5 cm wound, she pulled off the glass which relieved to the pain  for 1 day, then she started to develop swelling gradually, warmth, worsening tenderness and soreness of the right foot. She reported chills but denied fever, nausea, vomiting, change in her appetite.  Her hemoglobin A1c at this visit is 10 down from 13 compared with results from 1 months back. Patient taking sliding scale metformin for her diabetes. She does not follow-up with ophthalmologist or podiatrist. She denies chest pain, shortness of breath, cough, headache, abdominal pain, changes in urinary or bowel habitus,      Review of Systems   Constitutional: Positive for chills. Negative for activity change, appetite change, diaphoresis, fatigue, fever and unexpected weight change. HENT: Negative for congestion, rhinorrhea and sore throat. Eyes: Negative for pain. Respiratory: Negative for cough, shortness of breath and wheezing. Cardiovascular: Negative for chest pain, palpitations and leg swelling. Gastrointestinal: Negative for abdominal distention, abdominal pain, diarrhea, nausea and vomiting. Genitourinary: Negative for decreased urine volume, difficulty urinating, flank pain and urgency. Musculoskeletal: Negative for arthralgias and back pain. Neurological: Positive for headaches. Negative for tremors, seizures, weakness and light-headedness. Psychiatric/Behavioral: Negative for agitation and confusion. The patient has a   Family History   Problem Relation Age of Onset    Diabetes Mother     Stroke Mother     Diabetes Father     Diabetes Sister     Diabetes Brother     Other Son         GSW    No Known Problems Sister        Objective:    /71 (Site: Left Upper Arm, Position: Sitting, Cuff Size: Large Adult)   Pulse 86   Temp 97.9 °F (36.6 °C) (Temporal)   Wt 242 lb (109.8 kg)   LMP  (LMP Unknown)   BMI 40.27 kg/m²    BP Readings from Last 3 Encounters:   01/20/22 118/71   12/22/21 (!) 149/89   11/15/21 135/88       Physical Exam  Constitutional:       Appearance: Normal appearance. She is obese. Cardiovascular:      Rate and Rhythm: Normal rate and regular rhythm. Pulses: Normal pulses. Heart sounds: Normal heart sounds.    Pulmonary:      Effort: Pulmonary effort is normal.      Breath sounds: Normal breath sounds. Abdominal:      General: Abdomen is flat. Palpations: Abdomen is soft. Musculoskeletal:      Comments: Right foot exam:  0.5 cm wound on the medial side of the right sole, with surrounding erythema. +1 swelling of the right foot with warmth, erythema, and tenderness. Decreased dorsal pedal pulse on the right side. Left foot exam:  No wound, no lesions, no erythema no warmth no tenderness. Decreased dorsal pedal pulse on the left side   Neurological:      Mental Status: She is alert. Lab Results   Component Value Date    WBC 5.3 11/08/2021    HGB 9.8 (L) 11/08/2021    HCT 30.7 (L) 11/08/2021     11/08/2021    CHOL 275 (H) 12/30/2020    TRIG 246 (H) 12/30/2020    HDL 80 12/30/2020    ALT 11 10/08/2021    AST 16 10/08/2021     11/08/2021    K 4.3 11/08/2021     11/08/2021    CREATININE 0.67 11/08/2021    BUN 9 11/08/2021    CO2 20 11/08/2021    TSH 0.82 09/16/2021    LABA1C 10.0 01/20/2022    LABMICR 20 10/20/2020     Lab Results   Component Value Date    CALCIUM 8.5 (L) 11/08/2021    PHOS 3.2 11/06/2021     Lab Results   Component Value Date    LDLCHOLESTEROL 146 (H) 12/30/2020       Assessment and Plan:    1. Health maintenance examination  2. Neuropathy  -Lyrica refilled    3. Diabetes mellitus type 2 in obese (HCC)  - POCT glycosylated hemoglobin (Hb A1C) is 10  -She was advised to continue her insulin sliding scale and metformin.  -Counseled about lifestyle modifications including diet and exercise  4. Encounter for screening mammogram for breast cancer  -Mammogram ordered    5.  Screening for hyperlipidemia  -Lipid panel ordered    6. wound of right foot, limited to breakdown of skin ([address]  Associated with cellulitis of the right foot:  -We will prescribe doxycycline 100 mg twice daily for 7 days.  -Order x-ray of the right foot to assess the extent of the infection. -We will refer patient to podiatrist  -Patient was advised to keep with the foot clean and dry.  -She was advised to go to the ER if she experienced worsening of the symptoms or unbearable pain, uncontrolled fever, worsening chills or nausea vomiting.  -We will have patient follow-up again in the clinic on Monday to recheck the wound. Discussed with patient plan of care, patient voices understanding and agreement to follow-up on Monday. Requested Prescriptions     Pending Prescriptions Disp Refills    pregabalin (LYRICA) 200 MG capsule 60 capsule 0     Sig: Take 1 capsule by mouth 2 times daily for 30 days.  insulin glargine (LANTUS SOLOSTAR) 100 UNIT/ML injection pen 5 pen 5     Sig: Inject 30 Units into the skin 2 times daily    doxycycline hyclate (VIBRA-TABS) 100 MG tablet 14 tablet 0     Sig: Take 1 tablet by mouth 2 times daily for 7 days       There are no discontinued medications. Terri received counseling on the following healthy behaviors: nutrition, exercise and medication adherence    Discussed use,benefit, and side effects of prescribed medications. Barriers to medication compliance addressed. All patient questions answered. Pt voiced understanding. No follow-ups on file. Disclaimer: Some orall of this note was transcribed using voice-recognition software. This may cause typographical errors occasionally. Although all effort is made to fix these errors, please do not hesitate to contact our office if there Chin Nunez concern with the understanding of this note.

## 2022-01-21 ENCOUNTER — CARE COORDINATION (OUTPATIENT)
Dept: CARE COORDINATION | Age: 57
End: 2022-01-21
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
## 2022-01-21 NOTE — CARE COORDINATION
HC phoned and spoke with patient this morning, patient was unsure of the day  And time that she would prefer to go to [facility] for an Xray on her foot. Received  Information from Evelyn Canales stating that the patient would need transportation to  [facility] on Wed. 01/26/22 for an xray. HC arranged transportation for 9:30a. Patient may be picked up as early as 8a, confirmation number is [id number]. Transportation has been arranged for   Patient going to [facility] on Thursday, 01/27/22, for a 9:15a appointment. Patient can be picked up as early as 5:30a., confirmation number is [id number]. This information was texted to the patient. HC also texted patient the phone   number for [employer] for food.     Plan of Care  [facility]. will follow up with patient

## 2022-01-22 ENCOUNTER — HOSPITAL ENCOUNTER (INPATIENT)
Age: 57
LOS: 17 days | Discharge: NURSING, REHAB OR LONG-TERM CARE | DRG: 364 | End: 2022-02-08
Attending: EMERGENCY MEDICINE | Admitting: FAMILY MEDICINE
Payer: COMMERCIAL

## 2022-01-22 ENCOUNTER — APPOINTMENT (OUTPATIENT)
Dept: GENERAL RADIOLOGY | Age: 57
DRG: 364 | End: 2022-01-22
Payer: COMMERCIAL

## 2022-01-22 DIAGNOSIS — L97.522 ULCER OF LEFT FOOT, WITH FAT LAYER EXPOSED (HCC): ICD-10-CM

## 2022-01-22 DIAGNOSIS — Z98.890 STATUS POST INCISION AND DRAINAGE: ICD-10-CM

## 2022-01-22 DIAGNOSIS — L08.9 RIGHT FOOT INFECTION: Primary | ICD-10-CM

## 2022-01-22 DIAGNOSIS — R79.89 ELEVATED D-DIMER: ICD-10-CM

## 2022-01-22 LAB
ABSOLUTE EOS #: 0.3 K/UL (ref 0–0.4)
ABSOLUTE IMMATURE GRANULOCYTE: 0.15 K/UL (ref 0–0.3)
ABSOLUTE LYMPH #: 2.38 K/UL (ref 1–4.8)
ABSOLUTE MONO #: 1.79 K/UL (ref 0.1–0.8)
ALBUMIN SERPL-MCNC: 4 G/DL (ref 3.5–5.2)
ALBUMIN/GLOBULIN RATIO: 1.3 (ref 1–2.5)
ALP BLD-CCNC: 136 U/L (ref 35–104)
ALT SERPL-CCNC: 14 U/L (ref 5–33)
ANION GAP SERPL CALCULATED.3IONS-SCNC: 12 MMOL/L (ref 9–17)
AST SERPL-CCNC: 14 U/L
BASOPHILS # BLD: 0 % (ref 0–2)
BASOPHILS ABSOLUTE: 0 K/UL (ref 0–0.2)
BILIRUB SERPL-MCNC: <0.1 MG/DL (ref 0.3–1.2)
BUN BLDV-MCNC: 21 MG/DL (ref 6–20)
BUN/CREAT BLD: ABNORMAL (ref 9–20)
C-REACTIVE PROTEIN: 182.3 MG/L (ref 0–5)
CALCIUM SERPL-MCNC: 9 MG/DL (ref 8.6–10.4)
CHLORIDE BLD-SCNC: 105 MMOL/L (ref 98–107)
CHP ED QC CHECK: NORMAL
CO2: 23 MMOL/L (ref 20–31)
CREAT SERPL-MCNC: 1.03 MG/DL (ref 0.5–0.9)
D-DIMER QUANTITATIVE: 1.29 MG/L FEU
DIFFERENTIAL TYPE: ABNORMAL
EOSINOPHILS RELATIVE PERCENT: 2 % (ref 1–4)
GFR AFRICAN AMERICAN: >60 ML/MIN
GFR NON-AFRICAN AMERICAN: 56 ML/MIN
GFR SERPL CREATININE-BSD FRML MDRD: ABNORMAL ML/MIN/{1.73_M2}
GFR SERPL CREATININE-BSD FRML MDRD: ABNORMAL ML/MIN/{1.73_M2}
GLUCOSE BLD-MCNC: 186 MG/DL
GLUCOSE BLD-MCNC: 186 MG/DL (ref 65–105)
GLUCOSE BLD-MCNC: 67 MG/DL (ref 70–99)
HCT VFR BLD CALC: 28.9 % (ref 36.3–47.1)
HEMOGLOBIN: 9.1 G/DL (ref 11.9–15.1)
IMMATURE GRANULOCYTES: 1 %
LYMPHOCYTES # BLD: 16 % (ref 24–44)
MCH RBC QN AUTO: 31.6 PG (ref 25.2–33.5)
MCHC RBC AUTO-ENTMCNC: 31.5 G/DL (ref 28.4–34.8)
MCV RBC AUTO: 100.3 FL (ref 82.6–102.9)
MONOCYTES # BLD: 12 % (ref 1–7)
MORPHOLOGY: ABNORMAL
NRBC AUTOMATED: 0 PER 100 WBC
PDW BLD-RTO: 13.3 % (ref 11.8–14.4)
PLATELET # BLD: 363 K/UL (ref 138–453)
PLATELET ESTIMATE: ABNORMAL
PMV BLD AUTO: 11 FL (ref 8.1–13.5)
POTASSIUM SERPL-SCNC: 4.3 MMOL/L (ref 3.7–5.3)
RBC # BLD: 2.88 M/UL (ref 3.95–5.11)
RBC # BLD: ABNORMAL 10*6/UL
SARS-COV-2, RAPID: NOT DETECTED
SEG NEUTROPHILS: 69 % (ref 36–66)
SEGMENTED NEUTROPHILS ABSOLUTE COUNT: 10.28 K/UL (ref 1.8–7.7)
SODIUM BLD-SCNC: 140 MMOL/L (ref 135–144)
SPECIMEN DESCRIPTION: NORMAL
TOTAL PROTEIN: 7.2 G/DL (ref 6.4–8.3)
WBC # BLD: 14.9 K/UL (ref 3.5–11.3)
WBC # BLD: ABNORMAL 10*3/UL

## 2022-01-22 PROCEDURE — 2500000003 HC RX 250 WO HCPCS: Performed by: STUDENT IN AN ORGANIZED HEALTH CARE EDUCATION/TRAINING PROGRAM

## 2022-01-22 PROCEDURE — 99285 EMERGENCY DEPT VISIT HI MDM: CPT

## 2022-01-22 PROCEDURE — 2580000003 HC RX 258: Performed by: STUDENT IN AN ORGANIZED HEALTH CARE EDUCATION/TRAINING PROGRAM

## 2022-01-22 PROCEDURE — 85379 FIBRIN DEGRADATION QUANT: CPT

## 2022-01-22 PROCEDURE — 6370000000 HC RX 637 (ALT 250 FOR IP): Performed by: STUDENT IN AN ORGANIZED HEALTH CARE EDUCATION/TRAINING PROGRAM

## 2022-01-22 PROCEDURE — 80053 COMPREHEN METABOLIC PANEL: CPT

## 2022-01-22 PROCEDURE — 90471 IMMUNIZATION ADMIN: CPT | Performed by: STUDENT IN AN ORGANIZED HEALTH CARE EDUCATION/TRAINING PROGRAM

## 2022-01-22 PROCEDURE — 86140 C-REACTIVE PROTEIN: CPT

## 2022-01-22 PROCEDURE — 73630 X-RAY EXAM OF FOOT: CPT

## 2022-01-22 PROCEDURE — 85025 COMPLETE CBC W/AUTO DIFF WBC: CPT

## 2022-01-22 PROCEDURE — 93970 EXTREMITY STUDY: CPT

## 2022-01-22 PROCEDURE — 6360000002 HC RX W HCPCS: Performed by: STUDENT IN AN ORGANIZED HEALTH CARE EDUCATION/TRAINING PROGRAM

## 2022-01-22 PROCEDURE — 87635 SARS-COV-2 COVID-19 AMP PRB: CPT

## 2022-01-22 PROCEDURE — 1200000000 HC SEMI PRIVATE

## 2022-01-22 PROCEDURE — 82947 ASSAY GLUCOSE BLOOD QUANT: CPT

## 2022-01-22 PROCEDURE — 90715 TDAP VACCINE 7 YRS/> IM: CPT | Performed by: STUDENT IN AN ORGANIZED HEALTH CARE EDUCATION/TRAINING PROGRAM

## 2022-01-22 RX ORDER — ACETAMINOPHEN 325 MG/1
650 TABLET ORAL EVERY 6 HOURS PRN
Status: DISCONTINUED | OUTPATIENT
Start: 2022-01-22 | End: 2022-02-08 | Stop reason: HOSPADM

## 2022-01-22 RX ORDER — SODIUM CHLORIDE 9 MG/ML
INJECTION, SOLUTION INTRAVENOUS CONTINUOUS
Status: DISCONTINUED | OUTPATIENT
Start: 2022-01-22 | End: 2022-01-29

## 2022-01-22 RX ORDER — SODIUM CHLORIDE 0.9 % (FLUSH) 0.9 %
5-40 SYRINGE (ML) INJECTION EVERY 12 HOURS SCHEDULED
Status: DISCONTINUED | OUTPATIENT
Start: 2022-01-22 | End: 2022-02-08 | Stop reason: HOSPADM

## 2022-01-22 RX ORDER — CLINDAMYCIN PHOSPHATE 600 MG/50ML
600 INJECTION INTRAVENOUS EVERY 8 HOURS
Status: DISCONTINUED | OUTPATIENT
Start: 2022-01-22 | End: 2022-01-28

## 2022-01-22 RX ORDER — PANTOPRAZOLE SODIUM 20 MG/1
20 TABLET, DELAYED RELEASE ORAL
Status: DISCONTINUED | OUTPATIENT
Start: 2022-01-23 | End: 2022-02-08 | Stop reason: HOSPADM

## 2022-01-22 RX ORDER — POTASSIUM CHLORIDE 7.45 MG/ML
10 INJECTION INTRAVENOUS PRN
Status: DISCONTINUED | OUTPATIENT
Start: 2022-01-22 | End: 2022-02-08 | Stop reason: HOSPADM

## 2022-01-22 RX ORDER — ONDANSETRON 4 MG/1
4 TABLET, ORALLY DISINTEGRATING ORAL EVERY 8 HOURS PRN
Status: DISCONTINUED | OUTPATIENT
Start: 2022-01-22 | End: 2022-02-08 | Stop reason: HOSPADM

## 2022-01-22 RX ORDER — ACETAMINOPHEN 650 MG/1
650 SUPPOSITORY RECTAL EVERY 6 HOURS PRN
Status: DISCONTINUED | OUTPATIENT
Start: 2022-01-22 | End: 2022-02-08 | Stop reason: HOSPADM

## 2022-01-22 RX ORDER — SODIUM CHLORIDE 0.9 % (FLUSH) 0.9 %
5-40 SYRINGE (ML) INJECTION PRN
Status: DISCONTINUED | OUTPATIENT
Start: 2022-01-22 | End: 2022-02-08 | Stop reason: HOSPADM

## 2022-01-22 RX ORDER — DICYCLOMINE HYDROCHLORIDE 10 MG/1
10 CAPSULE ORAL 4 TIMES DAILY
Status: DISCONTINUED | OUTPATIENT
Start: 2022-01-22 | End: 2022-02-08 | Stop reason: HOSPADM

## 2022-01-22 RX ORDER — VENLAFAXINE HYDROCHLORIDE 150 MG/1
150 CAPSULE, EXTENDED RELEASE ORAL
Status: DISCONTINUED | OUTPATIENT
Start: 2022-01-23 | End: 2022-02-08 | Stop reason: HOSPADM

## 2022-01-22 RX ORDER — ACETAMINOPHEN 500 MG
1000 TABLET ORAL ONCE
Status: COMPLETED | OUTPATIENT
Start: 2022-01-22 | End: 2022-01-22

## 2022-01-22 RX ORDER — DEXTROSE MONOHYDRATE 50 MG/ML
100 INJECTION, SOLUTION INTRAVENOUS PRN
Status: DISCONTINUED | OUTPATIENT
Start: 2022-01-22 | End: 2022-02-08 | Stop reason: HOSPADM

## 2022-01-22 RX ORDER — BUDESONIDE AND FORMOTEROL FUMARATE DIHYDRATE 80; 4.5 UG/1; UG/1
2 AEROSOL RESPIRATORY (INHALATION) 2 TIMES DAILY
Status: DISCONTINUED | OUTPATIENT
Start: 2022-01-22 | End: 2022-02-08 | Stop reason: HOSPADM

## 2022-01-22 RX ORDER — ONDANSETRON 2 MG/ML
4 INJECTION INTRAMUSCULAR; INTRAVENOUS EVERY 6 HOURS PRN
Status: DISCONTINUED | OUTPATIENT
Start: 2022-01-22 | End: 2022-02-08 | Stop reason: HOSPADM

## 2022-01-22 RX ORDER — MIRTAZAPINE 15 MG/1
7.5 TABLET, FILM COATED ORAL NIGHTLY
Status: DISCONTINUED | OUTPATIENT
Start: 2022-01-23 | End: 2022-02-08 | Stop reason: HOSPADM

## 2022-01-22 RX ORDER — POLYETHYLENE GLYCOL 3350 17 G/17G
17 POWDER, FOR SOLUTION ORAL DAILY PRN
Status: DISCONTINUED | OUTPATIENT
Start: 2022-01-22 | End: 2022-02-08 | Stop reason: HOSPADM

## 2022-01-22 RX ORDER — DEXTROSE MONOHYDRATE 25 G/50ML
12.5 INJECTION, SOLUTION INTRAVENOUS PRN
Status: DISCONTINUED | OUTPATIENT
Start: 2022-01-22 | End: 2022-02-08 | Stop reason: HOSPADM

## 2022-01-22 RX ORDER — SODIUM CHLORIDE 9 MG/ML
25 INJECTION, SOLUTION INTRAVENOUS PRN
Status: DISCONTINUED | OUTPATIENT
Start: 2022-01-22 | End: 2022-02-08 | Stop reason: HOSPADM

## 2022-01-22 RX ORDER — FLUTICASONE PROPIONATE 50 MCG
1 SPRAY, SUSPENSION (ML) NASAL DAILY
Status: DISCONTINUED | OUTPATIENT
Start: 2022-01-23 | End: 2022-02-08 | Stop reason: HOSPADM

## 2022-01-22 RX ORDER — NICOTINE POLACRILEX 4 MG
15 LOZENGE BUCCAL PRN
Status: DISCONTINUED | OUTPATIENT
Start: 2022-01-22 | End: 2022-02-08 | Stop reason: HOSPADM

## 2022-01-22 RX ORDER — PREGABALIN 100 MG/1
200 CAPSULE ORAL 2 TIMES DAILY
Status: DISCONTINUED | OUTPATIENT
Start: 2022-01-22 | End: 2022-02-08 | Stop reason: HOSPADM

## 2022-01-22 RX ORDER — POTASSIUM CHLORIDE 20 MEQ/1
40 TABLET, EXTENDED RELEASE ORAL PRN
Status: DISCONTINUED | OUTPATIENT
Start: 2022-01-22 | End: 2022-02-08 | Stop reason: HOSPADM

## 2022-01-22 RX ORDER — INSULIN GLARGINE 100 [IU]/ML
30 INJECTION, SOLUTION SUBCUTANEOUS 2 TIMES DAILY
Status: DISCONTINUED | OUTPATIENT
Start: 2022-01-22 | End: 2022-01-23

## 2022-01-22 RX ORDER — OXYCODONE HYDROCHLORIDE 5 MG/1
5 TABLET ORAL EVERY 4 HOURS PRN
Status: DISCONTINUED | OUTPATIENT
Start: 2022-01-22 | End: 2022-02-06

## 2022-01-22 RX ADMIN — CLINDAMYCIN IN 5 PERCENT DEXTROSE 600 MG: 12 INJECTION, SOLUTION INTRAVENOUS at 21:08

## 2022-01-22 RX ADMIN — OXYCODONE HYDROCHLORIDE 5 MG: 5 TABLET ORAL at 19:45

## 2022-01-22 RX ADMIN — ACETAMINOPHEN 650 MG: 325 TABLET ORAL at 23:32

## 2022-01-22 RX ADMIN — SODIUM CHLORIDE: 9 INJECTION, SOLUTION INTRAVENOUS at 21:40

## 2022-01-22 RX ADMIN — ACETAMINOPHEN 1000 MG: 500 TABLET ORAL at 17:10

## 2022-01-22 RX ADMIN — DICYCLOMINE HYDROCHLORIDE 10 MG: 10 CAPSULE ORAL at 21:58

## 2022-01-22 RX ADMIN — PREGABALIN 200 MG: 100 CAPSULE ORAL at 22:02

## 2022-01-22 RX ADMIN — TETANUS TOXOID, REDUCED DIPHTHERIA TOXOID AND ACELLULAR PERTUSSIS VACCINE, ADSORBED 0.5 ML: 5; 2.5; 8; 8; 2.5 SUSPENSION INTRAMUSCULAR at 21:14

## 2022-01-22 RX ADMIN — OXYCODONE HYDROCHLORIDE 5 MG: 5 TABLET ORAL at 23:32

## 2022-01-22 ASSESSMENT — PAIN DESCRIPTION - LOCATION
LOCATION: FOOT
LOCATION: FOOT

## 2022-01-22 ASSESSMENT — ENCOUNTER SYMPTOMS
COUGH: 0
CHEST TIGHTNESS: 0
CONSTIPATION: 0
BACK PAIN: 0
COLOR CHANGE: 0
DIARRHEA: 0
ABDOMINAL PAIN: 1
WHEEZING: 0
SHORTNESS OF BREATH: 0
NAUSEA: 0
VOMITING: 0

## 2022-01-22 ASSESSMENT — PAIN SCALES - GENERAL
PAINLEVEL_OUTOF10: 10
PAINLEVEL_OUTOF10: 10
PAINLEVEL_OUTOF10: 3
PAINLEVEL_OUTOF10: 10

## 2022-01-22 NOTE — Clinical Note
Patient Class: Inpatient [101]   REQUIRED: Diagnosis: Right foot infection [699930]   Estimated Length of Stay: Estimated stay of more than 2 midnights   Admitting Provider: Tova Osborn [de-identified]

## 2022-01-22 NOTE — ED PROVIDER NOTES
[facility]     Emergency Department     Faculty Note/ Attestation      Pt Name: Maite Loving                                       MRN: [id number]  [place] 1965  Date of evaluation: 1/22/2022    Patients PCP:    Owen Parekh MD      Attestation  I performed a history and physical examination of the patient and discussed management with the resident. I reviewed the residents note and agree with the documented findings and plan of care. Any areas of disagreement are noted on the chart. I was personally present for the key portions of any procedures. I have documented in the chart those procedures where I was not present during the key portions. I have reviewed the emergency nurses triage note. I agree with the chief complaint, past medical history, past surgical history, allergies, medications, social and family history as documented unless otherwise noted below. For Physician Assistant/ Nurse Practitioner cases/documentation I have personally evaluated this patient and have completed at least one if not all key elements of the E/M (history, physical exam, and MDM). Additional findings are as noted. Initial Screens:        Major Coma Scale  Eye Opening: Spontaneous  Best Verbal Response: Oriented  Best Motor Response: Obeys commands  Major Coma Scale Score: 15    Vitals: There were no vitals filed for this visit.     CHIEF COMPLAINT       Chief Complaint   Patient presents with    Abdominal Pain    Foot Pain             DIAGNOSTIC RESULTS             RADIOLOGY:   XR FOOT RIGHT (MIN 3 VIEWS)    (Results Pending)   XR FOOT LEFT (MIN 3 VIEWS)    (Results Pending)         LABS:  Labs Reviewed   CBC WITH AUTO DIFFERENTIAL   COMPREHENSIVE METABOLIC PANEL W/ REFLEX TO MG FOR LOW K   C-REACTIVE PROTEIN   D-DIMER, QUANTITATIVE         EMERGENCY DEPARTMENT COURSE:     -------------------------   ,  ,  ,        Comments    Cut to foot, foreign body removed, placed on howie  Has DM  Today has worsening redness/swelling  also calf pain    Plan for imaging and basic labs    (Please note that portions of this note were completed with a voice recognition program.  Efforts were made to edit the dictations but occasionally words are mis-transcribed.)      Claus Alvarez MD,, MD  Attending Emergency Physician         Claus Alvarez MD  01/23/22 2059

## 2022-01-22 NOTE — ED NOTES
Bed: 30  Expected date:   Expected time:   Means of arrival:   Comments:  [address], RN  01/22/22 5037

## 2022-01-22 NOTE — ED PROVIDER NOTES
[facility]  ED  Emergency Department Encounter  EmergencyMedicine Resident     Pt Name:Terri Cruz Credit  MRN: [id number]  [place] 1965  Date of evaluation: 1/22/22  PCP:  Génesis Porras MD    [address] [place] [address]       Chief Complaint   Patient presents with    Abdominal Pain    Foot Pain       HISTORY OF PRESENT ILLNESS  (Location/Symptom, Timing/Onset, Context/Setting, Quality, Duration, Modifying Factors, Severity.)      Hannah Zaldivar is a 49 y.o. female who presents with right foot pain. Patient states she stepped on glass about a week ago, saw her primary care doctor and they prescribed antibiotics but states that the pain and swelling is worsened. There is blistering with underlying purulence. No active drainage wound. Patient has a history of diabetes and neuropathy. No fevers or chills. In regards to the abdominal pain, patient states she has fibroids and sometimes has pain in the right lower quadrant in the inguinal fold. This is acute on chronic, usually last for couple of days and then resolves and comes back later.     PAST MEDICAL / SURGICAL / SOCIAL / FAMILY HISTORY      has a past medical history of Acid reflux, Acute cystitis with hematuria, Acute non-recurrent maxillary sinusitis, Asthma, Bipolar 1 disorder ([address] Bipolar disorder, mixed ([address] BMI 34.0-34.9,adult, Cannabis use disorder, severe, dependence ([address] Cerebrovascular accident (CVA) ([address] Chest pain, Chronic renal insufficiency, Cocaine abuse ([address] COVID-19 virus RNA test result unknown, DDD (degenerative disc disease), cervical, Diabetes mellitus ([address] Dizziness, Fibromyalgia, History of stroke, Homicidal ideation, Hyperglycemia, Hypertension, Hypotension, IDDM (insulin dependent diabetes mellitus), Lupus ([address] Migraine, Neuropathy, Neuropathy, Polysubstance abuse ([address] Post traumatic stress disorder (PTSD), Posttraumatic stress disorder, Recurrent depression ([address] Recurrent major depressive disorder, in partial remission ([facility] Screening mammogram, encounter for, Severe recurrent major depression with psychotic features ([facility] Severe recurrent major depression without psychotic features ([facility] Stroke (cerebrum) ([facility] Stroke ([facility] Suicidal ideation, Suicidal intent, Vitamin D deficiency, and White matter changes. has a past surgical history that includes Abscess Drainage; chest tube insertion; Abdomen surgery; Cataract removal with implant (Bilateral); LASIK (Bilateral); Finger amputation (Left, 03/13/2021); Hand surgery (Right, 09/14/2021); Hand surgery (Right, 09/15/2021); Finger amputation (Right, 10/11/2021); and Finger amputation (Right, 10/11/2021). Social History     Socioeconomic History    Marital status: Legally      Spouse name: Not on file    Number of children: Not on file    Years of education: Not on file    Highest education level: Not on file   Occupational History    Not on file   Tobacco Use    Smoking status: Never Smoker    Smokeless tobacco: Never Used   Vaping Use    Vaping Use: Never used   Substance and Sexual Activity    Alcohol use: Not Currently    Drug use: Yes     Types: Marijuana ([place]), Cocaine     Comment: + Cocaine 2/2021 also, see Care Everywhere UDS    Sexual activity: Not Currently     Partners: Male   Other Topics Concern    Not on file   Social History Narrative    Not on file     Social Determinants of Health     Financial Resource Strain: High Risk    Difficulty of Paying Living Expenses: Hard   Food Insecurity: Food Insecurity Present    Worried About Running Out of Food in the Last Year: Often true    Kelin of Food in the Last Year: Often true   Transportation Needs: Unmet Transportation Needs    Lack of Transportation (Medical):  Yes    Lack of Transportation (Non-Medical): Yes   Physical Activity: Inactive    Days of Exercise per Week: 0 days    Minutes of Exercise per Session: 0 min   Stress: Stress Concern Present  Feeling of Stress : Rather much   Social Connections:     Frequency of Communication with Friends and Family: Not on file    Frequency of Social Gatherings with Friends and Family: Not on file    Attends Bahai Services: Not on file    Active Member of Clubs or Organizations: Not on file    Attends Club or Organization Meetings: Not on file    Marital Status: Not on file   Intimate Partner Violence:     Fear of Current or Ex-Partner: Not on file    Emotionally Abused: Not on file    Physically Abused: Not on file    Sexually Abused: Not on file   Housing Stability: High Risk    Unable to Pay for Housing in the Last Year: Yes    Number of Places Lived in the Last Year: 2    Unstable Housing in the Last Year: Yes       Family History   Problem Relation Age of Onset    Diabetes Mother     Stroke Mother     Diabetes Father     Diabetes Sister     Diabetes Brother     Other Son         GSW    No Known Problems Sister        Allergies:  Bactrim [sulfamethoxazole-trimethoprim] and Adhesive tape    Home Medications:  Prior to Admission medications    Medication Sig Start Date End Date Taking? Authorizing Provider   pregabalin (LYRICA) 200 MG capsule Take 1 capsule by mouth 2 times daily for 30 days.  1/20/22 2/19/22  Lauren Lara MD   insulin glargine (LANTUS SOLOSTAR) 100 UNIT/ML injection pen Inject 30 Units into the skin 2 times daily 1/20/22   Lauren Lara MD   doxycycline hyclate (VIBRA-TABS) 100 MG tablet Take 1 tablet by mouth 2 times daily for 7 days 1/20/22 1/27/22  Lauren Lara MD   fluticasone [facility]) 50 MCG/ACT nasal spray 1 spray by Each Nostril route daily 12/22/21   Carlton Brush MD   acetaminophen (TYLENOL) 500 MG tablet Take 2 tablets by mouth 3 times daily as needed for Pain 12/22/21   Carlton Brush MD   dicyclomine (BENTYL) 10 MG capsule Take 1 capsule by mouth 4 times daily 12/17/21   Carlton Brush MD   metFORMIN (GLUCOPHAGE) 1000 MG tablet Take 1 tablet by mouth 2 times daily (with meals) 12/17/21   Tommy Jay MD   mirtazapine (REMERON) 7.5 MG tablet Take 1 tablet by mouth nightly 12/17/21   Tommy Jay MD   pantoprazole (PROTONIX) 20 MG tablet Take 1 tablet by mouth 2 times daily (before meals) 12/17/21   Tomym Jay MD   traZODone (DESYREL) 150 MG tablet Take 1 tablet by mouth nightly 12/17/21   Tommy Jay MD   venlafaxine (EFFEXOR XR) 150 MG extended release capsule Take 1 capsule by mouth daily (with breakfast) 12/17/21   Tommy Jay MD   ibuprofen (ADVIL;MOTRIN) 800 MG tablet Take 1 tablet by mouth every 8 hours as needed for Pain 11/15/21 11/29/21  Guerita Caraballo MD   Lancets MISC 1 each by Does not apply route 3 times daily 11/15/21   Guerita Caraballo MD   Alcohol Swabs 70 % PADS Use 1 swab 3 times daily as needed 11/15/21   Guerita Caraballo MD   blood glucose monitor strips Test 3 times a day & as needed for symptoms of irregular blood glucose. Dispense sufficient amount for indicated testing frequency plus additional to accommodate PRN testing needs.  11/8/21   Carrington Chris MD   Lancets MISC 1 each by Does not apply route 3 times daily 11/8/21   Carrington Chris MD   Insulin Pen Needle ([employer] PEN NEEDLES 31G) 31G X 8 MM MISC 1 each by Does not apply route daily 11/8/21   Cecilia Colmenares MD   FREESTYLE LITE strip 1 each by Does not apply route 3 times daily 11/11/20   Tommy Jay MD   albuterol sulfate  (90 Base) MCG/ACT inhaler Inhale 2 puffs into the lungs every 4 hours as needed for Wheezing 10/20/20 9/22/21  Tommy Jay MD   FLOVENT  MCG/ACT inhaler Inhale 2 puffs into the lungs 2 times daily 10/20/20   Tommy Jay MD   budesonide-formoterol (SYMBICORT) 80-4.5 MCG/ACT AERO Inhale 2 puffs into the lungs 2 times daily 10/20/20   Tommy Jay MD       REVIEW OF SYSTEMS    (2-9 systems for level 4, 10 or more for level 5)      Review of Systems   Constitutional: Negative for chills, diaphoresis, fatigue and fever. HENT: Negative for congestion. Respiratory: Negative for cough, chest tightness, shortness of breath and wheezing. Cardiovascular: Negative for chest pain, palpitations and leg swelling. Gastrointestinal: Positive for abdominal pain (Right inguinal fold). Negative for constipation, diarrhea, nausea and vomiting. Endocrine: Negative for polydipsia, polyphagia and polyuria. Genitourinary: Negative for difficulty urinating, dysuria and urgency. Musculoskeletal: Negative for arthralgias, back pain, neck pain and neck stiffness. Skin: Positive for rash (Blistering and underlying purulence on the arch of the right foot) and wound (Well-healing puncture wound on the bottom of the right foot). Negative for color change and pallor. Neurological: Negative for dizziness, weakness, light-headedness and headaches. PHYSICAL EXAM   (up to 7 for level 4, 8 or more for level 5)      INITIAL VITALS:   BP (!) 153/86   Pulse 98   Temp 97.6 °F (36.4 °C)   Resp 18   LMP  (LMP Unknown)   SpO2 98%     Physical Exam  Vitals and nursing note reviewed. Constitutional:       General: She is not in acute distress. Appearance: She is well-developed. She is not diaphoretic. HENT:      Head: Normocephalic and atraumatic. Eyes:      General: No scleral icterus. Conjunctiva/sclera: Conjunctivae normal.      Pupils: Pupils are equal, round, and reactive to light. Neck:      Vascular: No JVD. Trachea: No tracheal deviation. Cardiovascular:      Rate and Rhythm: Normal rate and regular rhythm. Heart sounds: Normal heart sounds. No murmur heard. No friction rub. Pulmonary:      Effort: Pulmonary effort is normal. No respiratory distress. Breath sounds: Normal breath sounds. No wheezing. Chest:      Chest wall: No tenderness. Abdominal:      General: Bowel sounds are normal. There is no distension. Palpations: Abdomen is soft.       Tenderness: There is no abdominal tenderness. There is no guarding. Musculoskeletal:      Cervical back: Normal range of motion and neck supple. Skin:     General: Skin is warm and dry. Capillary Refill: Capillary refill takes less than 2 seconds. Coloration: Skin is not pale. Findings: Rash (Well-healing puncture wound to the bottom of the right foot as well as an area of blistering with underlying purulence in the arch of the right foot) present. No erythema. Neurological:      Mental Status: She is alert and oriented to person, place, and time. Cranial Nerves: No cranial nerve deficit. Sensory: No sensory deficit. Psychiatric:         Behavior: Behavior normal.         DIFFERENTIAL  DIAGNOSIS     PLAN (LABS / IMAGING / EKG):  Orders Placed This Encounter   Procedures    XR FOOT RIGHT (MIN 3 VIEWS)    XR FOOT LEFT (MIN 3 VIEWS)    VL DUP LOWER EXTREMITY VENOUS BILATERAL    CBC Auto Differential    Comprehensive Metabolic Panel w/ Reflex to MG    C-REACTIVE PROTEIN    D-DIMER, QUANTITATIVE    C-REACTIVE PROTEIN    Sedimentation Rate    Inpatient consult to [address] [place] [address] consult to [facility]    PATIENT STATUS (FROM ED OR OR/PROCEDURAL) Inpatient       MEDICATIONS ORDERED:  Orders Placed This Encounter   Medications    acetaminophen (TYLENOL) tablet 1,000 mg    oxyCODONE (ROXICODONE) immediate release tablet 5 mg    clindamycin (CLEOCIN) 600 mg in dextrose 5 % 50 mL IVPB     Order Specific Question:   Antimicrobial Indications     Answer:   Skin and Soft Tissue Infection    enoxaparin (LOVENOX) injection 80 mg    Tetanus-Diphth-Acell Pertussis (BOOSTRIX) injection 0.5 mL       DDX: Osteomyelitis, necrotizing fasciitis, cellulitis the abscess    MDM/IMPRESSION: This is a 62-year-old female with a history of diabetes and neuropathy presenting with right foot pain.   There appears to be a soft tissue infection, had difficulty getting her doxycycline previously prescribed by her PCP. Plan for x-ray, basic labs, probable admission to at least observation with the obvious foot infection and multiple previous toe amputations.     DIAGNOSTIC RESULTS / EMERGENCY DEPARTMENT COURSE / MDM   LAB RESULTS:  Results for orders placed or performed during the hospital encounter of 01/22/22   CBC Auto Differential   Result Value Ref Range    WBC 14.9 (H) 3.5 - 11.3 k/uL    RBC 2.88 (L) 3.95 - 5.11 m/uL    Hemoglobin 9.1 (L) 11.9 - 15.1 g/dL    Hematocrit 28.9 (L) 36.3 - 47.1 %    .3 82.6 - 102.9 fL    MCH 31.6 25.2 - 33.5 pg    MCHC 31.5 28.4 - 34.8 g/dL    RDW 13.3 11.8 - 14.4 %    Platelets 327 780 - 050 k/uL    MPV 11.0 8.1 - 13.5 fL    NRBC Automated 0.0 0.0 per 100 WBC    Differential Type NOT REPORTED     WBC Morphology NOT REPORTED     RBC Morphology NOT REPORTED     Platelet Estimate NOT REPORTED     Immature Granulocytes 1 (H) 0 %    Seg Neutrophils 69 (H) 36 - 66 %    Lymphocytes 16 (L) 24 - 44 %    Monocytes 12 (H) 1 - 7 %    Eosinophils % 2 1 - 4 %    Basophils 0 0 - 2 %    Absolute Immature Granulocyte 0.15 0.00 - 0.30 k/uL    Segs Absolute 10.28 (H) 1.8 - 7.7 k/uL    Absolute Lymph # 2.38 1.0 - 4.8 k/uL    Absolute Mono # 1.79 (H) 0.1 - 0.8 k/uL    Absolute Eos # 0.30 0.0 - 0.4 k/uL    Basophils Absolute 0.00 0.0 - 0.2 k/uL    Morphology 1+ POLYCHROMASIA    Comprehensive Metabolic Panel w/ Reflex to MG   Result Value Ref Range    Glucose 67 (L) 70 - 99 mg/dL    BUN 21 (H) 6 - 20 mg/dL    CREATININE 1.03 (H) 0.50 - 0.90 mg/dL    Bun/Cre Ratio NOT REPORTED 9 - 20    Calcium 9.0 8.6 - 10.4 mg/dL    Sodium 140 135 - 144 mmol/L    Potassium 4.3 3.7 - 5.3 mmol/L    Chloride 105 98 - 107 mmol/L    CO2 23 20 - 31 mmol/L    Anion Gap 12 9 - 17 mmol/L    Alkaline Phosphatase 136 (H) 35 - 104 U/L    ALT 14 5 - 33 U/L    AST 14 <32 U/L    Total Bilirubin <0.10 (L) 0.3 - 1.2 mg/dL    Total Protein 7.2 6.4 - 8.3 g/dL    Albumin 4.0 3.5 - 5.2 g/dL    Albumin/Globulin Ratio 1.3 1.0 - 2.5    GFR Non- 56 (L) >60 mL/min    GFR African American >60 >60 mL/min    GFR Comment          GFR Staging NOT REPORTED    C-REACTIVE PROTEIN   Result Value Ref Range    .3 (H) 0.0 - 5.0 mg/L   D-DIMER, QUANTITATIVE   Result Value Ref Range    D-Dimer, Quant 1.29 mg/L FEU         RADIOLOGY:  XR FOOT RIGHT (MIN 3 VIEWS)   Final Result   Right foot: No radiopaque foreign body. No fracture. Moderate soft tissue   swelling within the anterior aspect of plantar aspect of the foot   predominantly underlying meta tarsal heads. Left foot: Changes related to prior partial amputation of 4th and 5th ray as   described. No acute fracture. No radiopaque foreign body. Moderate soft   tissue swelling and plantar aspect of the foot. No discrete soft tissue ulcer   is noted. XR FOOT LEFT (MIN 3 VIEWS)   Final Result   Right foot: No radiopaque foreign body. No fracture. Moderate soft tissue   swelling within the anterior aspect of plantar aspect of the foot   predominantly underlying meta tarsal heads. Left foot: Changes related to prior partial amputation of 4th and 5th ray as   described. No acute fracture. No radiopaque foreign body. Moderate soft   tissue swelling and plantar aspect of the foot. No discrete soft tissue ulcer   is noted. VL DUP LOWER EXTREMITY VENOUS BILATERAL    (Results Pending)        EKG      All EKG's are interpreted by the Emergency Department Physician who either signs or Co-signs this chart in the absence of a cardiologist.    EMERGENCY DEPARTMENT COURSE:  ED Course as of 01/22/22 2059   Sat Jan 22, 2022   1828 WBC(!): 14.9 [JG]   1941 Spoke with podiatry, will evaluate. [JG]   2005 Family medicine, accepted for admission.  [JG]      ED Course User Index  [JG] Lexi Solomon DO        PROCEDURES:      CONSULTS:  IP CONSULT TO PODIATRY  IP CONSULT TO FAMILY MEDICINE  IP CONSULT TO SOCIAL WORK    CRITICAL CARE:      FINAL IMPRESSION      1. Right foot infection    2. Elevated d-dimer          DISPOSITION / PLAN     DISPOSITION Admitted 01/22/2022 08:21:35 PM      PATIENT REFERRED TO:  No follow-up provider specified.     DISCHARGE MEDICATIONS:  New Prescriptions    No medications on file       Fareed Love DO  Emergency Medicine Resident    (Please note that portions of thisnote were completed with a voice recognition program.  Efforts were made to edit the dictations but occasionally words are mis-transcribed.)     Fareed Love DO  01/22/22 2059

## 2022-01-23 ENCOUNTER — APPOINTMENT (OUTPATIENT)
Dept: MRI IMAGING | Age: 57
DRG: 364 | End: 2022-01-23
Payer: COMMERCIAL

## 2022-01-23 PROBLEM — Z79.4 TYPE 2 DIABETES MELLITUS WITHOUT COMPLICATION, WITH LONG-TERM CURRENT USE OF INSULIN (HCC): Status: ACTIVE | Noted: 2021-11-04

## 2022-01-23 PROBLEM — E11.9 TYPE 2 DIABETES MELLITUS WITHOUT COMPLICATION, WITH LONG-TERM CURRENT USE OF INSULIN (HCC): Status: ACTIVE | Noted: 2021-11-04

## 2022-01-23 LAB
ABSOLUTE EOS #: 0.15 K/UL (ref 0–0.44)
ABSOLUTE IMMATURE GRANULOCYTE: 0.15 K/UL (ref 0–0.3)
ABSOLUTE LYMPH #: 2.7 K/UL (ref 1.1–3.7)
ABSOLUTE MONO #: 1.95 K/UL (ref 0.1–1.2)
ANION GAP SERPL CALCULATED.3IONS-SCNC: 12 MMOL/L (ref 9–17)
BASOPHILS # BLD: 1 % (ref 0–2)
BASOPHILS ABSOLUTE: 0.15 K/UL (ref 0–0.2)
BUN BLDV-MCNC: 14 MG/DL (ref 6–20)
BUN/CREAT BLD: NORMAL (ref 9–20)
C-REACTIVE PROTEIN: 250.9 MG/L (ref 0–5)
CALCIUM SERPL-MCNC: 8.7 MG/DL (ref 8.6–10.4)
CHLORIDE BLD-SCNC: 102 MMOL/L (ref 98–107)
CO2: 21 MMOL/L (ref 20–31)
CREAT SERPL-MCNC: 0.63 MG/DL (ref 0.5–0.9)
DIFFERENTIAL TYPE: ABNORMAL
EOSINOPHILS RELATIVE PERCENT: 1 % (ref 1–4)
GFR AFRICAN AMERICAN: >60 ML/MIN
GFR NON-AFRICAN AMERICAN: >60 ML/MIN
GFR SERPL CREATININE-BSD FRML MDRD: NORMAL ML/MIN/{1.73_M2}
GFR SERPL CREATININE-BSD FRML MDRD: NORMAL ML/MIN/{1.73_M2}
GLUCOSE BLD-MCNC: 170 MG/DL (ref 65–105)
GLUCOSE BLD-MCNC: 326 MG/DL (ref 65–105)
GLUCOSE BLD-MCNC: 81 MG/DL (ref 65–105)
GLUCOSE BLD-MCNC: 84 MG/DL (ref 65–105)
GLUCOSE BLD-MCNC: 96 MG/DL (ref 70–99)
HCT VFR BLD CALC: 28.5 % (ref 36.3–47.1)
HEMOGLOBIN: 8.9 G/DL (ref 11.9–15.1)
IMMATURE GRANULOCYTES: 1 %
LYMPHOCYTES # BLD: 18 % (ref 24–43)
MCH RBC QN AUTO: 31 PG (ref 25.2–33.5)
MCHC RBC AUTO-ENTMCNC: 31.2 G/DL (ref 28.4–34.8)
MCV RBC AUTO: 99.3 FL (ref 82.6–102.9)
MONOCYTES # BLD: 13 % (ref 3–12)
MORPHOLOGY: NORMAL
NRBC AUTOMATED: 0 PER 100 WBC
PDW BLD-RTO: 13.4 % (ref 11.8–14.4)
PLATELET # BLD: 375 K/UL (ref 138–453)
PLATELET ESTIMATE: ABNORMAL
PMV BLD AUTO: 10.8 FL (ref 8.1–13.5)
POTASSIUM SERPL-SCNC: 4.4 MMOL/L (ref 3.7–5.3)
RBC # BLD: 2.87 M/UL (ref 3.95–5.11)
RBC # BLD: ABNORMAL 10*6/UL
SEDIMENTATION RATE, ERYTHROCYTE: 124 MM (ref 0–30)
SEG NEUTROPHILS: 66 % (ref 36–65)
SEGMENTED NEUTROPHILS ABSOLUTE COUNT: 9.9 K/UL (ref 1.5–8.1)
SODIUM BLD-SCNC: 135 MMOL/L (ref 135–144)
WBC # BLD: 15 K/UL (ref 3.5–11.3)
WBC # BLD: ABNORMAL 10*3/UL

## 2022-01-23 PROCEDURE — 80048 BASIC METABOLIC PNL TOTAL CA: CPT

## 2022-01-23 PROCEDURE — 0JBR0ZZ EXCISION OF LEFT FOOT SUBCUTANEOUS TISSUE AND FASCIA, OPEN APPROACH: ICD-10-PCS | Performed by: PODIATRIST

## 2022-01-23 PROCEDURE — 87075 CULTR BACTERIA EXCEPT BLOOD: CPT

## 2022-01-23 PROCEDURE — 86403 PARTICLE AGGLUT ANTBDY SCRN: CPT

## 2022-01-23 PROCEDURE — 86140 C-REACTIVE PROTEIN: CPT

## 2022-01-23 PROCEDURE — 87070 CULTURE OTHR SPECIMN AEROBIC: CPT

## 2022-01-23 PROCEDURE — 2500000003 HC RX 250 WO HCPCS: Performed by: PODIATRIST

## 2022-01-23 PROCEDURE — 10061 I&D ABSCESS COMP/MULTIPLE: CPT | Performed by: PODIATRIST

## 2022-01-23 PROCEDURE — 6370000000 HC RX 637 (ALT 250 FOR IP): Performed by: STUDENT IN AN ORGANIZED HEALTH CARE EDUCATION/TRAINING PROGRAM

## 2022-01-23 PROCEDURE — 99223 1ST HOSP IP/OBS HIGH 75: CPT | Performed by: FAMILY MEDICINE

## 2022-01-23 PROCEDURE — 6360000002 HC RX W HCPCS: Performed by: STUDENT IN AN ORGANIZED HEALTH CARE EDUCATION/TRAINING PROGRAM

## 2022-01-23 PROCEDURE — 85025 COMPLETE CBC W/AUTO DIFF WBC: CPT

## 2022-01-23 PROCEDURE — 87205 SMEAR GRAM STAIN: CPT

## 2022-01-23 PROCEDURE — 36415 COLL VENOUS BLD VENIPUNCTURE: CPT

## 2022-01-23 PROCEDURE — 99223 1ST HOSP IP/OBS HIGH 75: CPT | Performed by: PODIATRIST

## 2022-01-23 PROCEDURE — 94761 N-INVAS EAR/PLS OXIMETRY MLT: CPT

## 2022-01-23 PROCEDURE — 2500000003 HC RX 250 WO HCPCS: Performed by: STUDENT IN AN ORGANIZED HEALTH CARE EDUCATION/TRAINING PROGRAM

## 2022-01-23 PROCEDURE — 2580000003 HC RX 258: Performed by: STUDENT IN AN ORGANIZED HEALTH CARE EDUCATION/TRAINING PROGRAM

## 2022-01-23 PROCEDURE — 11042 DBRDMT SUBQ TIS 1ST 20SQCM/<: CPT | Performed by: PODIATRIST

## 2022-01-23 PROCEDURE — 73718 MRI LOWER EXTREMITY W/O DYE: CPT

## 2022-01-23 PROCEDURE — 85652 RBC SED RATE AUTOMATED: CPT

## 2022-01-23 PROCEDURE — 94640 AIRWAY INHALATION TREATMENT: CPT

## 2022-01-23 PROCEDURE — 82947 ASSAY GLUCOSE BLOOD QUANT: CPT

## 2022-01-23 PROCEDURE — 94664 DEMO&/EVAL PT USE INHALER: CPT

## 2022-01-23 PROCEDURE — 1200000000 HC SEMI PRIVATE

## 2022-01-23 RX ORDER — ALPRAZOLAM 0.5 MG/1
0.5 TABLET ORAL PRN
Status: COMPLETED | OUTPATIENT
Start: 2022-01-23 | End: 2022-01-23

## 2022-01-23 RX ORDER — LIDOCAINE HYDROCHLORIDE 10 MG/ML
5 INJECTION, SOLUTION EPIDURAL; INFILTRATION; INTRACAUDAL; PERINEURAL ONCE
Status: COMPLETED | OUTPATIENT
Start: 2022-01-23 | End: 2022-01-23

## 2022-01-23 RX ORDER — INSULIN GLARGINE 100 [IU]/ML
20 INJECTION, SOLUTION SUBCUTANEOUS 2 TIMES DAILY
Status: DISCONTINUED | OUTPATIENT
Start: 2022-01-23 | End: 2022-01-24

## 2022-01-23 RX ADMIN — FLUTICASONE PROPIONATE 1 SPRAY: 50 SPRAY, METERED NASAL at 08:18

## 2022-01-23 RX ADMIN — OXYCODONE HYDROCHLORIDE 5 MG: 5 TABLET ORAL at 21:11

## 2022-01-23 RX ADMIN — CLINDAMYCIN IN 5 PERCENT DEXTROSE 600 MG: 12 INJECTION, SOLUTION INTRAVENOUS at 04:07

## 2022-01-23 RX ADMIN — DICYCLOMINE HYDROCHLORIDE 10 MG: 10 CAPSULE ORAL at 21:10

## 2022-01-23 RX ADMIN — OXYCODONE HYDROCHLORIDE 5 MG: 5 TABLET ORAL at 08:16

## 2022-01-23 RX ADMIN — ALPRAZOLAM 0.5 MG: 0.5 TABLET ORAL at 17:02

## 2022-01-23 RX ADMIN — BUDESONIDE AND FORMOTEROL FUMARATE DIHYDRATE 2 PUFF: 80; 4.5 AEROSOL RESPIRATORY (INHALATION) at 21:11

## 2022-01-23 RX ADMIN — BUDESONIDE AND FORMOTEROL FUMARATE DIHYDRATE 2 PUFF: 80; 4.5 AEROSOL RESPIRATORY (INHALATION) at 08:18

## 2022-01-23 RX ADMIN — LIDOCAINE HYDROCHLORIDE 5 ML: 10 INJECTION, SOLUTION EPIDURAL; INFILTRATION; INTRACAUDAL; PERINEURAL at 12:05

## 2022-01-23 RX ADMIN — MIRTAZAPINE 7.5 MG: 15 TABLET, FILM COATED ORAL at 21:10

## 2022-01-23 RX ADMIN — SODIUM CHLORIDE, PRESERVATIVE FREE 10 ML: 5 INJECTION INTRAVENOUS at 08:19

## 2022-01-23 RX ADMIN — OXYCODONE HYDROCHLORIDE 5 MG: 5 TABLET ORAL at 16:30

## 2022-01-23 RX ADMIN — VENLAFAXINE HYDROCHLORIDE 150 MG: 150 CAPSULE, EXTENDED RELEASE ORAL at 08:18

## 2022-01-23 RX ADMIN — INSULIN LISPRO 8 UNITS: 100 INJECTION, SOLUTION INTRAVENOUS; SUBCUTANEOUS at 16:30

## 2022-01-23 RX ADMIN — OXYCODONE HYDROCHLORIDE 5 MG: 5 TABLET ORAL at 12:24

## 2022-01-23 RX ADMIN — OXYCODONE HYDROCHLORIDE 5 MG: 5 TABLET ORAL at 04:06

## 2022-01-23 RX ADMIN — TRAZODONE HYDROCHLORIDE 150 MG: 100 TABLET ORAL at 21:10

## 2022-01-23 RX ADMIN — PREGABALIN 200 MG: 100 CAPSULE ORAL at 21:10

## 2022-01-23 RX ADMIN — CLINDAMYCIN IN 5 PERCENT DEXTROSE 600 MG: 12 INJECTION, SOLUTION INTRAVENOUS at 12:23

## 2022-01-23 RX ADMIN — CLINDAMYCIN IN 5 PERCENT DEXTROSE 600 MG: 12 INJECTION, SOLUTION INTRAVENOUS at 21:10

## 2022-01-23 RX ADMIN — PREGABALIN 200 MG: 100 CAPSULE ORAL at 08:18

## 2022-01-23 RX ADMIN — DICYCLOMINE HYDROCHLORIDE 10 MG: 10 CAPSULE ORAL at 08:18

## 2022-01-23 RX ADMIN — BUDESONIDE AND FORMOTEROL FUMARATE DIHYDRATE 2 PUFF: 80; 4.5 AEROSOL RESPIRATORY (INHALATION) at 00:14

## 2022-01-23 ASSESSMENT — ENCOUNTER SYMPTOMS
FACIAL SWELLING: 0
NAUSEA: 0
WHEEZING: 0
SHORTNESS OF BREATH: 0
COUGH: 0
CONSTIPATION: 0
ABDOMINAL PAIN: 0
SORE THROAT: 0
VOMITING: 0
DIARRHEA: 0
CHEST TIGHTNESS: 0

## 2022-01-23 ASSESSMENT — PAIN SCALES - GENERAL
PAINLEVEL_OUTOF10: 7
PAINLEVEL_OUTOF10: 5
PAINLEVEL_OUTOF10: 10
PAINLEVEL_OUTOF10: 10
PAINLEVEL_OUTOF10: 7
PAINLEVEL_OUTOF10: 10
PAINLEVEL_OUTOF10: 8
PAINLEVEL_OUTOF10: 9
PAINLEVEL_OUTOF10: 10
PAINLEVEL_OUTOF10: 10
PAINLEVEL_OUTOF10: 8

## 2022-01-23 ASSESSMENT — PAIN DESCRIPTION - PAIN TYPE
TYPE: ACUTE PAIN
TYPE: ACUTE PAIN

## 2022-01-23 ASSESSMENT — PAIN DESCRIPTION - ORIENTATION
ORIENTATION: RIGHT
ORIENTATION: RIGHT;LEFT

## 2022-01-23 ASSESSMENT — PAIN DESCRIPTION - LOCATION
LOCATION: FOOT

## 2022-01-23 ASSESSMENT — PAIN DESCRIPTION - FREQUENCY: FREQUENCY: CONTINUOUS

## 2022-01-23 ASSESSMENT — PAIN DESCRIPTION - DESCRIPTORS: DESCRIPTORS: ACHING;DISCOMFORT

## 2022-01-23 NOTE — H&P
[address] [facility]  History & Physical Examination Note              Date:   1/23/2022  Patient name:  Isabel Chua  Date of admission:  1/22/2022  4:43 PM  MRN:   [id number]  YOB: 1965    CHIEF COMPLAINT:       Chief Complaint   Patient presents with    Abdominal Pain    Foot Pain       History Obtained From:  Patient and chart review. HPI:     The patient is a 49 y.o.  female with history of DM2, HTN, bipolar who presented to the ED with complaint of RLE pain. Patient reported that she stepped on glass ~6 days ago with her right foot. Patient stated she has been having pain and swelling of the right foot since then. Patient was seen in outpatient office on 1/20 and prescribed doxycycline for possible infection however patient stated she has not started taking the antibiotics yet. Patient stated last night her RLE pain suddenly worsened and that is why she presented to the ER today. Patient denied any associated fever, chills, n/v/d, SOB, chest pain. In the ED, patient is afebrile, VSS. Labs significant for leukocytosis, elevated CRP. X-ray of foot did not show any foreign body however did show moderate soft tissue swelling. Patient is admitted to the hospital for likely RLE foot infection.     PAST MEDICAL HISTORY:        has a past medical history of Acid reflux, Acute cystitis with hematuria, Acute non-recurrent maxillary sinusitis, Asthma, Bipolar 1 disorder ([address] Bipolar disorder, mixed ([address] BMI 34.0-34.9,adult, Cannabis use disorder, severe, dependence ([address] Cerebrovascular accident (CVA) ([address] Chest pain, Chronic renal insufficiency, Cocaine abuse ([address] COVID-19 virus RNA test result unknown, DDD (degenerative disc disease), cervical, Diabetes mellitus ([address] Dizziness, Fibromyalgia, History of stroke, Homicidal ideation, Hyperglycemia, Hypertension, Hypotension, IDDM (insulin dependent diabetes mellitus), Lupus ([address] Migraine, Neuropathy, Neuropathy, Polysubstance abuse ([facility] Post traumatic stress disorder (PTSD), Posttraumatic stress disorder, Recurrent depression ([facility] Recurrent major depressive disorder, in partial remission ([address] Screening mammogram, encounter for, Severe recurrent major depression with psychotic features ([address] Severe recurrent major depression without psychotic features ([address] Stroke (cerebrum) ([facility] [address] Stroke ([address] Suicidal ideation, Suicidal intent, Vitamin D deficiency, and White matter changes. PAST SURGICAL HISTORY:      has a past surgical history that includes Abscess Drainage; chest tube insertion; Abdomen surgery; Cataract removal with implant (Bilateral); LASIK (Bilateral); Finger amputation (Left, 03/13/2021); Hand surgery (Right, 09/14/2021); Hand surgery (Right, 09/15/2021); Finger amputation (Right, 10/11/2021); and Finger amputation (Right, 10/11/2021). FAMILY HISTORY:     family history includes Diabetes in her brother, father, mother, and sister; No Known Problems in her sister; Other in her son; Stroke in her mother. HOME MEDICATIONS:     Prior to Admission medications    Medication Sig Start Date End Date Taking? Authorizing Provider   pregabalin (LYRICA) 200 MG capsule Take 1 capsule by mouth 2 times daily for 30 days.  1/20/22 2/19/22  Nura You MD   insulin glargine (LANTUS SOLOSTAR) 100 UNIT/ML injection pen Inject 30 Units into the skin 2 times daily 1/20/22   Nura You MD   doxycycline hyclate (VIBRA-TABS) 100 MG tablet Take 1 tablet by mouth 2 times daily for 7 days 1/20/22 1/27/22  Nura You MD   fluticasone [facility]) 50 MCG/ACT nasal spray 1 spray by Each Nostril route daily 12/22/21   Tommy Jay MD   acetaminophen (TYLENOL) 500 MG tablet Take 2 tablets by mouth 3 times daily as needed for Pain 12/22/21   Tommy Jay MD   dicyclomine (BENTYL) 10 MG capsule Take 1 capsule by mouth 4 times daily 12/17/21   Tommy Jay MD metFORMIN (GLUCOPHAGE) 1000 MG tablet Take 1 tablet by mouth 2 times daily (with meals) 12/17/21   Marquez Krause MD   mirtazapine (REMERON) 7.5 MG tablet Take 1 tablet by mouth nightly 12/17/21   Marquez Krause MD   pantoprazole (PROTONIX) 20 MG tablet Take 1 tablet by mouth 2 times daily (before meals) 12/17/21   Marquez Krause MD   traZODone (DESYREL) 150 MG tablet Take 1 tablet by mouth nightly 12/17/21   Marquez Krause MD   venlafaxine (EFFEXOR XR) 150 MG extended release capsule Take 1 capsule by mouth daily (with breakfast) 12/17/21   Marquez Krause MD   ibuprofen (ADVIL;MOTRIN) 800 MG tablet Take 1 tablet by mouth every 8 hours as needed for Pain 11/15/21 11/29/21  Irven Severin, MD   Lancets MISC 1 each by Does not apply route 3 times daily 11/15/21   Irven Severin, MD   Alcohol Swabs 70 % PADS Use 1 swab 3 times daily as needed 11/15/21   Irven Severin, MD   blood glucose monitor strips Test 3 times a day & as needed for symptoms of irregular blood glucose. Dispense sufficient amount for indicated testing frequency plus additional to accommodate PRN testing needs.  11/8/21   Ivette Rodriguez MD   Lancets MISC 1 each by Does not apply route 3 times daily 11/8/21   Ivette Rodriguez MD   Insulin Pen Needle ([employer] PEN NEEDLES 31G) 31G X 8 MM MISC 1 each by Does not apply route daily 11/8/21   Ciarra Herozg MD   FREESTYLE LITE strip 1 each by Does not apply route 3 times daily 11/11/20   Marquez Krause MD   albuterol sulfate  (90 Base) MCG/ACT inhaler Inhale 2 puffs into the lungs every 4 hours as needed for Wheezing 10/20/20 9/22/21  Marquez Krause MD   FLOVENT  MCG/ACT inhaler Inhale 2 puffs into the lungs 2 times daily 10/20/20   Marquez Krause MD   budesonide-formoterol (SYMBICORT) 80-4.5 MCG/ACT AERO Inhale 2 puffs into the lungs 2 times daily 10/20/20   Marquez Krause MD       ALLERGIES:      Bactrim [sulfamethoxazole-trimethoprim] and Adhesive tape    SOCIAL HISTORY: Differential    Collection Time: 01/22/22  5:44 PM   Result Value Ref Range    WBC 14.9 (H) 3.5 - 11.3 k/uL    RBC 2.88 (L) 3.95 - 5.11 m/uL    Hemoglobin 9.1 (L) 11.9 - 15.1 g/dL    Hematocrit 28.9 (L) 36.3 - 47.1 %    .3 82.6 - 102.9 fL    MCH 31.6 25.2 - 33.5 pg    MCHC 31.5 28.4 - 34.8 g/dL    RDW 13.3 11.8 - 14.4 %    Platelets 330 499 - 877 k/uL    MPV 11.0 8.1 - 13.5 fL    NRBC Automated 0.0 0.0 per 100 WBC    Differential Type NOT REPORTED     WBC Morphology NOT REPORTED     RBC Morphology NOT REPORTED     Platelet Estimate NOT REPORTED     Immature Granulocytes 1 (H) 0 %    Seg Neutrophils 69 (H) 36 - 66 %    Lymphocytes 16 (L) 24 - 44 %    Monocytes 12 (H) 1 - 7 %    Eosinophils % 2 1 - 4 %    Basophils 0 0 - 2 %    Absolute Immature Granulocyte 0.15 0.00 - 0.30 k/uL    Segs Absolute 10.28 (H) 1.8 - 7.7 k/uL    Absolute Lymph # 2.38 1.0 - 4.8 k/uL    Absolute Mono # 1.79 (H) 0.1 - 0.8 k/uL    Absolute Eos # 0.30 0.0 - 0.4 k/uL    Basophils Absolute 0.00 0.0 - 0.2 k/uL    Morphology 1+ POLYCHROMASIA    Comprehensive Metabolic Panel w/ Reflex to MG    Collection Time: 01/22/22  5:44 PM   Result Value Ref Range    Glucose 67 (L) 70 - 99 mg/dL    BUN 21 (H) 6 - 20 mg/dL    CREATININE 1.03 (H) 0.50 - 0.90 mg/dL    Bun/Cre Ratio NOT REPORTED 9 - 20    Calcium 9.0 8.6 - 10.4 mg/dL    Sodium 140 135 - 144 mmol/L    Potassium 4.3 3.7 - 5.3 mmol/L    Chloride 105 98 - 107 mmol/L    CO2 23 20 - 31 mmol/L    Anion Gap 12 9 - 17 mmol/L    Alkaline Phosphatase 136 (H) 35 - 104 U/L    ALT 14 5 - 33 U/L    AST 14 <32 U/L    Total Bilirubin <0.10 (L) 0.3 - 1.2 mg/dL    Total Protein 7.2 6.4 - 8.3 g/dL    Albumin 4.0 3.5 - 5.2 g/dL    Albumin/Globulin Ratio 1.3 1.0 - 2.5    GFR Non- 56 (L) >60 mL/min    GFR African American >60 >60 mL/min    GFR Comment          GFR Staging NOT REPORTED    C-REACTIVE PROTEIN    Collection Time: 01/22/22  5:44 PM   Result Value Ref Range    .3 (H) 0.0 - 5.0 mg/L D-DIMER, QUANTITATIVE    Collection Time: 01/22/22  5:44 PM   Result Value Ref Range    D-Dimer, Quant 1.29 mg/L FEU   POC Glucose Fingerstick    Collection Time: 01/22/22 10:05 PM   Result Value Ref Range    POC Glucose 186 (H) 65 - 105 mg/dL   POCT glucose    Collection Time: 01/22/22 10:06 PM   Result Value Ref Range    Glucose 186 mg/dL    QC OK? ok    COVID-19, Rapid    Collection Time: 01/22/22 11:03 PM    Specimen: Nasopharyngeal Swab   Result Value Ref Range    Specimen Description . NASOPHARYNGEAL SWAB     SARS-CoV-2, Rapid Not Detected Not Detected   POC Glucose Fingerstick    Collection Time: 01/23/22 12:27 AM   Result Value Ref Range    POC Glucose 170 (H) 65 - 105 mg/dL         Imaging/[employer]:  XR FOOT LEFT (MIN 3 VIEWS)    Result Date: 1/22/2022  EXAMINATION: THREE XRAY VIEWS OF THE RIGHT FOOT; THREE XRAY VIEWS OF THE LEFT FOOT 1/22/2022 5:11 pm COMPARISON: None. HISTORY: ORDERING SYSTEM PROVIDED HISTORY: pain bottom of R foot, stepped on glass a few days ago, worsening edema and erythema TECHNOLOGIST PROVIDED HISTORY: pain bottom of R foot, stepped on glass a few days ago, worsening edema and erythema FINDINGS: Right foot: No acute fracture or dislocation is detected. The osseous structures are intact and properly aligned. No concerning lytic or sclerotic lesion is identified. The visualized joints appear unremarkable. No radiopaque foreign bodies noted. Moderate soft tissue swelling within the anterior aspect of plantar aspect of the foot predominantly underlying meta tarsal heads. Left foot: There are changes related to amputation of 5th ray at the level proximal shaft of 5th metatarsal.  There are also changes related to amputation of 4th ray digits at the level of base of the proximal phalanx. No acute fracture or dislocation is detected. No concerning lytic or sclerotic lesion is identified. The visualized joints appear unremarkable.  Moderate soft tissue swelling and plantar aspect of the foot.  No discrete soft tissue ulcer is noted. Right foot: No radiopaque foreign body. No fracture. Moderate soft tissue swelling within the anterior aspect of plantar aspect of the foot predominantly underlying meta tarsal heads. Left foot: Changes related to prior partial amputation of 4th and 5th ray as described. No acute fracture. No radiopaque foreign body. Moderate soft tissue swelling and plantar aspect of the foot. No discrete soft tissue ulcer is noted. XR FOOT RIGHT (MIN 3 VIEWS)    Result Date: 1/22/2022  EXAMINATION: THREE XRAY VIEWS OF THE RIGHT FOOT; THREE XRAY VIEWS OF THE LEFT FOOT 1/22/2022 5:11 pm COMPARISON: None. HISTORY: ORDERING SYSTEM PROVIDED HISTORY: pain bottom of R foot, stepped on glass a few days ago, worsening edema and erythema TECHNOLOGIST PROVIDED HISTORY: pain bottom of R foot, stepped on glass a few days ago, worsening edema and erythema FINDINGS: Right foot: No acute fracture or dislocation is detected. The osseous structures are intact and properly aligned. No concerning lytic or sclerotic lesion is identified. The visualized joints appear unremarkable. No radiopaque foreign bodies noted. Moderate soft tissue swelling within the anterior aspect of plantar aspect of the foot predominantly underlying meta tarsal heads. Left foot: There are changes related to amputation of 5th ray at the level proximal shaft of 5th metatarsal.  There are also changes related to amputation of 4th ray digits at the level of base of the proximal phalanx. No acute fracture or dislocation is detected. No concerning lytic or sclerotic lesion is identified. The visualized joints appear unremarkable. Moderate soft tissue swelling and plantar aspect of the foot. No discrete soft tissue ulcer is noted. Right foot: No radiopaque foreign body. No fracture. Moderate soft tissue swelling within the anterior aspect of plantar aspect of the foot predominantly underlying meta tarsal heads.  Left foot: Changes related to prior partial amputation of 4th and 5th ray as described. No acute fracture. No radiopaque foreign body. Moderate soft tissue swelling and plantar aspect of the foot. No discrete soft tissue ulcer is noted. ASSESSMENT:       Active Problems:    Right foot infection  Resolved Problems:    * No resolved hospital problems. *      PLAN:     Patient status: Admit the patient as Inpatient in the Med/Surge Unit    RLE foot wound/infection  - afebrile  - leukocytosis, elevated CRP  - X-ray revealed no foreign body in foot. However moderate right foot swelling.   - on IV clindamycin  - pain control  -  ml/hr  - podiatry consulted, follow up recommendations  - elevated D-dimer, r/o DVT. Started on full dose lovenox BID in the ED  - consider gout?     DM2  - POCT glucose  - on lantus 30U BID, currently on hold due to NPO status, will reduce dose by 50% if patient remains NPO  - medium dose ISS  - hypoglycemia protocol    Bipolar disorder  - stable  - continue remeron 7.5 mg PO nightly  - effexor  mg po daily  - trazadone 150 mg PO nightly    DVT prophylaxis: already anticoagulated with full dose lovenox  GI prophylaxis: Protonix 20 mg PO BID      Consultations:   Consults: IP CONSULT TO PODIATRY  IP CONSULT TO FAMILY MEDICINE  IP CONSULT TO SOCIAL WORK  PT/OT       The patient's medical condition (specify RLE foot infection) would be significantly and directly threatened if care was provided in a less intensive setting      Above plan discussed with the patient in room, who agreed to the above plan     Plan will be discussed with the attending, Dr. Dafne Barraza MD  Family Medicine Resident  1/23/2022 6:53 AM

## 2022-01-23 NOTE — ED NOTES
Pt resting on cot. Continues to have c/o of BL feet pain. Pt medicated for pain. Family practice resident at bedside.                   Amberly Luque LPN  11/05/28 [id number]

## 2022-01-23 NOTE — PROGRESS NOTES
MRI checklist filled out and faxed. Patient states she needs medication for MRI since she is  claustrophic- perfect served family medicine resident.

## 2022-01-23 NOTE — CARE COORDINATION
Case Management Initial Discharge Plan  Terri Palmer,             Met with:patient to discuss discharge plans. Information verified: address, contacts, phone number, , insurance Yes  Insurance Provider: [email or web address]    Emergency Contact/Next of Kin name & number: N/A  Who are involved in patient's support system? N/A    PCP: Annalise Amaral MD  Date of last visit:       Discharge Planning    Living Arrangements:  [address] has 4 stories, lives in apartment, has elevator  0 stairs to climb to get into front door, 0 stairs to climb to reach second floor  Location of bedroom/bathroom in home main level    Patient able to perform ADL's:Independent    Current Services (outpatient & in home) had home care , does not want agency back  DME equipment: N/A  DME provider: N/A    Is patient receiving oral anticoagulation therapy? No    If indicated:   Physician managing anticoagulation treatment: N/A  Where does patient obtain lab work for ATC treatment? N/A      Potential Assistance Needed:  Transportation,Home Care    Patient agreeable to home care: Yes  Freedom of choice provided:  yes    Prior SNF/Rehab Placement and Facility: yes  Agreeable to SNF/Rehab: No  [place] of choice provided: n/a     Evaluation: n/a    Expected Discharge date:    unknown  Patient expects to be discharged to: If home: is the family and/or caregiver wiling & able to provide support at home? unknown  Who will be providing this support? No one    Follow Up Appointment: Best Day/ Time:      Transportation provider: unknown  Transportation arrangements needed for discharge: Yes    Readmission Risk              Risk of Unplanned Readmission:  43             Does patient have a readmission risk score greater than 14?: Yes  If yes, follow-up appointment must be made within 7 days of discharge.      Goals of Care: wound healing, pain control, safety      Educated patient on transitional options, provided freedom of choice and are agreeable with plan      Discharge Plan: home with home care, needs a ride          Electronically signed by Vignesh Chery RN on 1/23/22 at 12:00 PM EST

## 2022-01-23 NOTE — PROGRESS NOTES
joint pains, no muscle aches, no swelling of joints or extremities  NEUROLOGICAL: No  Weakness or numbness      PAST MEDICAL HISTORY:      has a past medical history of Acid reflux, Acute cystitis with hematuria, Acute non-recurrent maxillary sinusitis, Asthma, Bipolar 1 disorder ([address] Bipolar disorder, mixed ([address] BMI 34.0-34.9,adult, Cannabis use disorder, severe, dependence ([address] Cerebrovascular accident (CVA) ([address] Chest pain, Chronic renal insufficiency, Cocaine abuse ([address] COVID-19 virus RNA test result unknown, DDD (degenerative disc disease), cervical, Diabetes mellitus ([address] Dizziness, Fibromyalgia, History of stroke, Homicidal ideation, Hyperglycemia, Hypertension, Hypotension, IDDM (insulin dependent diabetes mellitus), Lupus ([address] Migraine, Neuropathy, Neuropathy, Polysubstance abuse ([address] Post traumatic stress disorder (PTSD), Posttraumatic stress disorder, Recurrent depression ([address] Recurrent major depressive disorder, in partial remission ([address] Screening mammogram, encounter for, Severe recurrent major depression with psychotic features ([address] Severe recurrent major depression without psychotic features ([address] Stroke (cerebrum) ([address] Stroke ([address] Suicidal ideation, Suicidal intent, Vitamin D deficiency, and White matter changes. PAST SURGICAL HISTORY:      has a past surgical history that includes Abscess Drainage; chest tube insertion; Abdomen surgery; Cataract removal with implant (Bilateral); LASIK (Bilateral); Finger amputation (Left, 03/13/2021); Hand surgery (Right, 09/14/2021); Hand surgery (Right, 09/15/2021); Finger amputation (Right, 10/11/2021); and Finger amputation (Right, 10/11/2021). SOCIAL HISTORY:      reports that she has never smoked. She has never used smokeless tobacco. She reports previous alcohol use. She reports current drug use. Drugs: Marijuana (Weed) and Cocaine.      FAMILY HISTORY:     family history includes Diabetes in her brother, father, mother, and sister; No Known Problems in her sister; Other in her son; Stroke in her mother. HOME MEDICATIONS:      Prior to Admission medications    Medication Sig Start Date End Date Taking? Authorizing Provider   pregabalin (LYRICA) 200 MG capsule Take 1 capsule by mouth 2 times daily for 30 days.  1/20/22 2/19/22  Adrienne Sanabria MD   insulin glargine (LANTUS SOLOSTAR) 100 UNIT/ML injection pen Inject 30 Units into the skin 2 times daily 1/20/22   Adrienne Sanabria MD   doxycycline hyclate (VIBRA-TABS) 100 MG tablet Take 1 tablet by mouth 2 times daily for 7 days 1/20/22 1/27/22  Adrienne Sanabria MD   fluticasone [facility]) 50 MCG/ACT nasal spray 1 spray by Each Nostril route daily 12/22/21   Génesis Porras MD   acetaminophen (TYLENOL) 500 MG tablet Take 2 tablets by mouth 3 times daily as needed for Pain 12/22/21   Génesis Porras MD   dicyclomine (BENTYL) 10 MG capsule Take 1 capsule by mouth 4 times daily 12/17/21   Génesis Porras MD   metFORMIN (GLUCOPHAGE) 1000 MG tablet Take 1 tablet by mouth 2 times daily (with meals) 12/17/21   Génesis Porras MD   mirtazapine (REMERON) 7.5 MG tablet Take 1 tablet by mouth nightly 12/17/21   Génesis Porras MD   pantoprazole (PROTONIX) 20 MG tablet Take 1 tablet by mouth 2 times daily (before meals) 12/17/21   Génesis Porras MD   traZODone (DESYREL) 150 MG tablet Take 1 tablet by mouth nightly 12/17/21   Génesis Porras MD   venlafaxine (EFFEXOR XR) 150 MG extended release capsule Take 1 capsule by mouth daily (with breakfast) 12/17/21   Génesis Porras MD   ibuprofen (ADVIL;MOTRIN) 800 MG tablet Take 1 tablet by mouth every 8 hours as needed for Pain 11/15/21 11/29/21  Maury Scott MD   Lancets MISC 1 each by Does not apply route 3 times daily 11/15/21   Maury Scott MD   Alcohol Swabs 70 % PADS Use 1 swab 3 times daily as needed 11/15/21   Maury Scott MD   blood glucose monitor strips Test 3 times a day & as needed for symptoms of irregular blood glucose. Dispense sufficient amount for indicated testing frequency plus additional to accommodate PRN testing needs. 11/8/21   Adrian Severin, MD   Lancets MISC 1 each by Does not apply route 3 times daily 11/8/21   Adrian Severin, MD   Insulin Pen Needle ([employer] PEN NEEDLES 31G) 31G X 8 MM MISC 1 each by Does not apply route daily 11/8/21   Danyel Núñez MD   FREESTYLE LITE strip 1 each by Does not apply route 3 times daily 11/11/20   Parish Gracia MD   albuterol sulfate  (90 Base) MCG/ACT inhaler Inhale 2 puffs into the lungs every 4 hours as needed for Wheezing 10/20/20 9/22/21  Parish Gracia MD   FLOVENT  MCG/ACT inhaler Inhale 2 puffs into the lungs 2 times daily 10/20/20   Parish Gracia MD   budesonide-formoterol (SYMBICORT) 80-4.5 MCG/ACT AERO Inhale 2 puffs into the lungs 2 times daily 10/20/20   Parish Gracia MD       ALLERGIES:     Bactrim [sulfamethoxazole-trimethoprim] and Adhesive tape      OBJECTIVE:       Vitals:    01/23/22 0000 01/23/22 0115 01/23/22 0345 01/23/22 0714   BP: (!) 140/95  (!) 100/55 130/78   Pulse: 103  69 85   Resp: 24  24 20   Temp: 101.1 °F (38.4 °C) 98.4 °F (36.9 °C) 98.1 °F (36.7 °C) 100.4 °F (38 °C)   TempSrc: Oral Oral Oral Oral   SpO2: 95%   93%   Weight: 239 lb (108.4 kg)      Height: 5' 6\" (1.676 m)            Intake/Output Summary (Last 24 hours) at 1/23/2022 1016  Last data filed at 1/22/2022 2323  Gross per 24 hour   Intake --   Output 600 ml   Net -600 ml       PHYSICAL EXAM:  General Appearance  Alert , awake , not in acute distress  HEENT - Head is normocephalic, atraumatic. Lungs - Bilateral equal air entry , no wheezes, rales or rhonchi, aeration good  Cardiovascular - Heart sounds are normal.  Regular rhythm, normal rate without murmur, gallop or rub.   Abdomen - Soft, nontender, nondistended, no masses or organomegaly  Neurologic - There are no new focal motor or sensory deficits  Skin - No bruising or bleeding on exposed skin area  Extremities -right foot swelling, bullae, tenderness.  No discharge       DIAGNOSTICS:     Laboratory Testing:    Recent Results (from the past 24 hour(s))   CBC Auto Differential    Collection Time: 01/22/22  5:44 PM   Result Value Ref Range    WBC 14.9 (H) 3.5 - 11.3 k/uL    RBC 2.88 (L) 3.95 - 5.11 m/uL    Hemoglobin 9.1 (L) 11.9 - 15.1 g/dL    Hematocrit 28.9 (L) 36.3 - 47.1 %    .3 82.6 - 102.9 fL    MCH 31.6 25.2 - 33.5 pg    MCHC 31.5 28.4 - 34.8 g/dL    RDW 13.3 11.8 - 14.4 %    Platelets 030 228 - 881 k/uL    MPV 11.0 8.1 - 13.5 fL    NRBC Automated 0.0 0.0 per 100 WBC    Differential Type NOT REPORTED     WBC Morphology NOT REPORTED     RBC Morphology NOT REPORTED     Platelet Estimate NOT REPORTED     Immature Granulocytes 1 (H) 0 %    Seg Neutrophils 69 (H) 36 - 66 %    Lymphocytes 16 (L) 24 - 44 %    Monocytes 12 (H) 1 - 7 %    Eosinophils % 2 1 - 4 %    Basophils 0 0 - 2 %    Absolute Immature Granulocyte 0.15 0.00 - 0.30 k/uL    Segs Absolute 10.28 (H) 1.8 - 7.7 k/uL    Absolute Lymph # 2.38 1.0 - 4.8 k/uL    Absolute Mono # 1.79 (H) 0.1 - 0.8 k/uL    Absolute Eos # 0.30 0.0 - 0.4 k/uL    Basophils Absolute 0.00 0.0 - 0.2 k/uL    Morphology 1+ POLYCHROMASIA    Comprehensive Metabolic Panel w/ Reflex to MG    Collection Time: 01/22/22  5:44 PM   Result Value Ref Range    Glucose 67 (L) 70 - 99 mg/dL    BUN 21 (H) 6 - 20 mg/dL    CREATININE 1.03 (H) 0.50 - 0.90 mg/dL    Bun/Cre Ratio NOT REPORTED 9 - 20    Calcium 9.0 8.6 - 10.4 mg/dL    Sodium 140 135 - 144 mmol/L    Potassium 4.3 3.7 - 5.3 mmol/L    Chloride 105 98 - 107 mmol/L    CO2 23 20 - 31 mmol/L    Anion Gap 12 9 - 17 mmol/L    Alkaline Phosphatase 136 (H) 35 - 104 U/L    ALT 14 5 - 33 U/L    AST 14 <32 U/L    Total Bilirubin <0.10 (L) 0.3 - 1.2 mg/dL    Total Protein 7.2 6.4 - 8.3 g/dL    Albumin 4.0 3.5 - 5.2 g/dL    Albumin/Globulin Ratio 1.3 1.0 - 2.5    GFR Non- 56 (L) >60 mL/min    GFR African American >60 >60 mL/min    GFR Comment          GFR Staging NOT REPORTED    C-REACTIVE PROTEIN    Collection Time: 01/22/22  5:44 PM   Result Value Ref Range    .3 (H) 0.0 - 5.0 mg/L   D-DIMER, QUANTITATIVE    Collection Time: 01/22/22  5:44 PM   Result Value Ref Range    D-Dimer, Quant 1.29 mg/L FEU   POC Glucose Fingerstick    Collection Time: 01/22/22 10:05 PM   Result Value Ref Range    POC Glucose 186 (H) 65 - 105 mg/dL   POCT glucose    Collection Time: 01/22/22 10:06 PM   Result Value Ref Range    Glucose 186 mg/dL    QC OK? ok    COVID-19, Rapid    Collection Time: 01/22/22 11:03 PM    Specimen: Nasopharyngeal Swab   Result Value Ref Range    Specimen Description . NASOPHARYNGEAL SWAB     SARS-CoV-2, Rapid Not Detected Not Detected   POC Glucose Fingerstick    Collection Time: 01/23/22 12:27 AM   Result Value Ref Range    POC Glucose 170 (H) 65 - 105 mg/dL             Current Facility-Administered Medications   Medication Dose Route Frequency Provider Last Rate Last Admin    oxyCODONE (ROXICODONE) immediate release tablet 5 mg  5 mg Oral Q4H PRN Joshua Nicholson MD   5 mg at 01/23/22 0406    clindamycin (CLEOCIN) 600 mg in dextrose 5 % 50 mL IVPB  600 mg IntraVENous Nissa Melissa MD   Stopped at 01/23/22 0450    budesonide-formoterol (SYMBICORT) 80-4.5 MCG/ACT inhaler 2 puff  2 puff Inhalation BID Joshua Nicholson MD   2 puff at 01/23/22 0014    dicyclomine (BENTYL) capsule 10 mg  10 mg Oral 4x Daily Joshua Nicholson MD   10 mg at 01/22/22 2158    fluticasone (FLONASE) 50 MCG/ACT nasal spray 1 spray  1 spray Each Nostril Daily Joshua Nicholson MD        [facility] by provider] insulin glargine (LANTUS) injection vial 30 Units  30 Units SubCUTAneous BID Joshua Nicholson MD        mirtazapine (REMERON) tablet 7.5 mg  7.5 mg Oral Nightly Cuong SCHNEIDER MD        pantoprazole (PROTONIX) tablet 20 mg  20 mg Oral BID AC Cuong SCHNEIDER MD        pregabalin (LYRICA) capsule 200 mg  200 mg Oral BID Joshua Nicholson MD   200 mg at 01/22/22 7685    traZODone (DESYREL) tablet 150 mg  150 mg Oral Nightly Patricia Sandhu MD        venlafaxine (EFFEXOR XR) extended release capsule 150 mg  150 mg Oral Daily with breakfast Patricia Sandhu MD        sodium chloride flush 0.9 % injection 5-40 mL  5-40 mL IntraVENous 2 times per day Patricia Sandhu MD        sodium chloride flush 0.9 % injection 5-40 mL  5-40 mL IntraVENous PRN Patricia Sandhu MD        0.9 % sodium chloride infusion  25 mL IntraVENous PRN Patricia Sandhu MD        ondansetron (ZOFRAN-ODT) disintegrating tablet 4 mg  4 mg Oral Q8H PRN Patricia Sandhu MD        Or    ondansetron [facility]) injection 4 mg  4 mg IntraVENous Q6H PRN Patricia Sandhu MD        polyethylene glycol [facility]) packet 17 g  17 g Oral Daily PRN Patricia Sandhu MD        acetaminophen (TYLENOL) tablet 650 mg  650 mg Oral Q6H PRN Patricia Sandhu MD   650 mg at 01/22/22 2332    Or    acetaminophen (TYLENOL) suppository 650 mg  650 mg Rectal Q6H PRN Patricia Sandhu MD        0.9 % sodium chloride infusion   IntraVENous Continuous Patricia Sandhu  mL/hr at 01/22/22 2140 New Bag at 01/22/22 2140    potassium chloride (KLOR-CON M) extended release tablet 40 mEq  40 mEq Oral PRN Patricia Sandhu MD        Or   [place] potassium bicarb-citric acid (EFFER-K) effervescent tablet 40 mEq  40 mEq Oral PRN Patricia Sandhu MD        Or   [place] potassium chloride 10 mEq/100 mL IVPB (Peripheral Line)  10 mEq IntraVENous PRJAKE Sandhu MD        insulin lispro (HUMALOG) injection vial 0-12 Units  0-12 Units SubCUTAneous TID WC Patricia Sandhu MD        insulin lispro (HUMALOG) injection vial 0-6 Units  0-6 Units SubCUTAneous Nightly Patricia Sandhu MD        glucose (GLUTOSE) 40 % oral gel 15 g  15 g Oral PRN Patricia Sandhu MD        dextrose 50 % IV solution  12.5 g IntraVENous PRN Patricia Sandhu MD        glucagon (rDNA) injection 1 mg  1 mg IntraMUSCular PRN Patricia Sandhu MD        dextrose 5 % solution  100 mL/hr IntraVENous PRN Patricia Sandhu MD        enoxaparin (LOVENOX) injection 105 mg  1 mg/kg SubCUTAneous BID Arminda Mckenzie DO Faby           ASSESSMENT:     Active Problems:    Right foot infection  Resolved Problems:    * No resolved hospital problems. *      PLAN:     RLE foot wound/infection  - Tmax 100.4  - leukocytosis, elevated CRP  - X-ray revealed no foreign body in foot. However moderate right foot swelling.   - on IV clindamycin  - pain control  -  ml/hr  - podiatry consulted, follow up recommendations  - elevated D-dimer, r/o DVT. Started on full dose lovenox BID in the ED  - consider gout?     DM2  -  A1C 10  -POCT glucose  - on lantus 30U BID, currently on hold due to NPO status, will reduce dose by 50% if patient remains NPO  - medium dose ISS  - hypoglycemia protocol     Bipolar disorder  - stable  - continue remeron 7.5 mg PO nightly  - effexor  mg po daily  - trazadone 150 mg PO nightly     DVT prophylaxis: already anticoagulated with full dose lovenox  GI prophylaxis: Protonix 20 mg PO BID        Consultations:   Consults: IP CONSULT TO PODIATRY  IP CONSULT TO FAMILY MEDICINE  IP CONSULT TO SOCIAL WORK  PT/OT         The patient's medical condition (specify RLE foot infection) would be significantly and directly threatened if care was provided in a less intensive setting        Above plan discussed with the patient in room, who agreed to the above plan      Plan will be discussed with the attending, Dr. George Donnelly MD  Family Medicine Resident  1/23/2022 7:22 AM            Please note that this chart was generated using voice recognition Dragon dictation software.   Although every effort was made to ensure the accuracy of this automated transcription, some errors in transcription may have occurred

## 2022-01-23 NOTE — PROGRESS NOTES
[facility]  Occupational Therapy Not Seen Note    DATE: 2022    NAME: Vicky Guevara  MRN: [id number]   : 1965      Patient not seen this date for Occupational Therapy due to:     Other: dopplers ordered, will eval once results available     Next Scheduled Treatment: check back 2022    Electronically signed by FREDI Kaufman on 2022 at 8:26 AM

## 2022-01-23 NOTE — CONSULTS
Consultation Note  Podiatric Medicine and Surgery     Subjective     Chief Complaint: right foot pain    HPI:  Bruce Meneses is a 49 y.o. female seen at [facility] for right foot pain. Patient stepped on a piece of glass a few days ago. She went to her PCP when PCP removed the glass. She presented to the ED last night due to increased pain and swelling to the right lower extremity. Upon arrival, X-ray was taken which showed no foreign body, acute osseous deformity or soft tissue emphysema. During my evaluation, patient also mentioned pain on her plantar left foot and  pain is not as bad as her right foot. Per chart review, patient visited [facility] podiatry clinic a year ago for diabetic foot care but never follows up after that. 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 Her last A1C in 12/13/2021 is 13.3%. She admits neuropathic pain to bilateral lower extremity. She denies other pedal complaints. PCP is Richard Mart MD    ROS:   Review of Systems   Constitutional: Negative for chills and fever. HENT: Negative for facial swelling and sore throat. Respiratory: Negative for cough, chest tightness, shortness of breath and wheezing. Cardiovascular: Positive for leg swelling. Negative for chest pain and palpitations. Gastrointestinal: Negative for constipation, diarrhea, nausea and vomiting. Musculoskeletal: Positive for gait problem. Skin: Positive for wound.        Past Medical History   has a past medical history of Acid reflux, Acute cystitis with hematuria, Acute non-recurrent maxillary sinusitis, Asthma, Bipolar 1 disorder ([address] Bipolar disorder, mixed ([address] BMI 34.0-34.9,adult, Cannabis use disorder, severe, dependence ([address] Cerebrovascular accident (CVA) ([address] Chest pain, Chronic renal insufficiency, Cocaine abuse ([address] COVID-19 virus RNA test result unknown, DDD (degenerative disc disease), cervical, Diabetes mellitus ([address] Dizziness, Fibromyalgia, History of stroke, Homicidal ideation, Hyperglycemia, Hypertension, Hypotension, IDDM (insulin dependent diabetes mellitus), Lupus ([facility] Migraine, Neuropathy, Neuropathy, Polysubstance abuse ([facility] Post traumatic stress disorder (PTSD), Posttraumatic stress disorder, Recurrent depression ([facility] Recurrent major depressive disorder, in partial remission ([facility] Screening mammogram, encounter for, Severe recurrent major depression with psychotic features ([facility] Severe recurrent major depression without psychotic features ([facility] Stroke (cerebrum) ([facility] Stroke ([facility] Suicidal ideation, Suicidal intent, Vitamin D deficiency, and White matter changes. Past Surgical History   has a past surgical history that includes Abscess Drainage; chest tube insertion; Abdomen surgery; Cataract removal with implant (Bilateral); LASIK (Bilateral); Finger amputation (Left, 03/13/2021); Hand surgery (Right, 09/14/2021); Hand surgery (Right, 09/15/2021); Finger amputation (Right, 10/11/2021); and Finger amputation (Right, 10/11/2021). Medications  Prior to Admission medications    Medication Sig Start Date End Date Taking? Authorizing Provider   pregabalin (LYRICA) 200 MG capsule Take 1 capsule by mouth 2 times daily for 30 days.  1/20/22 2/19/22  Angela Caruso MD   insulin glargine (LANTUS SOLOSTAR) 100 UNIT/ML injection pen Inject 30 Units into the skin 2 times daily 1/20/22   Angela Caruso MD   doxycycline hyclate (VIBRA-TABS) 100 MG tablet Take 1 tablet by mouth 2 times daily for 7 days 1/20/22 1/27/22  Angela Caruso MD   fluticasone Aniceto Hence) 50 MCG/ACT nasal spray 1 spray by Each Nostril route daily 12/22/21   Frances Aranda MD   acetaminophen (TYLENOL) 500 MG tablet Take 2 tablets by mouth 3 times daily as needed for Pain 12/22/21   Frances Aranda MD   dicyclomine (BENTYL) 10 MG capsule Take 1 capsule by mouth 4 times daily 12/17/21   Frances Aranda MD   metFORMIN (GLUCOPHAGE) 1000 MG tablet Take 1 tablet by mouth 2 times daily (with meals) 12/17/21   Gabe Gann MD   mirtazapine (REMERON) 7.5 MG tablet Take 1 tablet by mouth nightly 12/17/21   Gabe Gann MD   pantoprazole (PROTONIX) 20 MG tablet Take 1 tablet by mouth 2 times daily (before meals) 12/17/21   Gabe Gann MD   traZODone (DESYREL) 150 MG tablet Take 1 tablet by mouth nightly 12/17/21   Gabe Gann MD   venlafaxine (EFFEXOR XR) 150 MG extended release capsule Take 1 capsule by mouth daily (with breakfast) 12/17/21   Gabe Gann MD   ibuprofen (ADVIL;MOTRIN) 800 MG tablet Take 1 tablet by mouth every 8 hours as needed for Pain 11/15/21 11/29/21  Luis Kohler MD   Lancets MISC 1 each by Does not apply route 3 times daily 11/15/21   Luis Kohler MD   Alcohol Swabs 70 % PADS Use 1 swab 3 times daily as needed 11/15/21   Luis Kohler MD   blood glucose monitor strips Test 3 times a day & as needed for symptoms of irregular blood glucose. Dispense sufficient amount for indicated testing frequency plus additional to accommodate PRN testing needs.  11/8/21   Deb Reyes MD   Lancets MISC 1 each by Does not apply route 3 times daily 11/8/21   Deb Reyes MD   Insulin Pen Needle ([employer] PEN NEEDLES 31G) 31G X 8 MM MISC 1 each by Does not apply route daily 11/8/21   Astrid Tamez MD   FREESTYLE LITE strip 1 each by Does not apply route 3 times daily 11/11/20   Gabe Gann MD   albuterol sulfate  (90 Base) MCG/ACT inhaler Inhale 2 puffs into the lungs every 4 hours as needed for Wheezing 10/20/20 9/22/21  Gabe Gann MD   FLOVENT  MCG/ACT inhaler Inhale 2 puffs into the lungs 2 times daily 10/20/20   Gabe Gann MD   budesonide-formoterol (SYMBICORT) 80-4.5 MCG/ACT AERO Inhale 2 puffs into the lungs 2 times daily 10/20/20   Gabe Gann MD    Scheduled Meds:   clindamycin (CLEOCIN) IV  600 mg IntraVENous Q8H    budesonide-formoterol  2 puff Inhalation BID    dicyclomine  10 mg Oral 4x Daily    fluticasone  1 spray Each Nostril Daily    [Held by provider] insulin glargine  30 Units SubCUTAneous BID    mirtazapine  7.5 mg Oral Nightly    pantoprazole  20 mg Oral BID AC    pregabalin  200 mg Oral BID    traZODone  150 mg Oral Nightly    venlafaxine  150 mg Oral Daily with breakfast    sodium chloride flush  5-40 mL IntraVENous 2 times per day    insulin lispro  0-12 Units SubCUTAneous TID WC    insulin lispro  0-6 Units SubCUTAneous Nightly    enoxaparin  1 mg/kg SubCUTAneous BID     Continuous Infusions:   sodium chloride      sodium chloride 100 mL/hr at 22 2140    dextrose       PRN Meds:.oxyCODONE, sodium chloride flush, sodium chloride, ondansetron **OR** ondansetron, polyethylene glycol, acetaminophen **OR** acetaminophen, potassium chloride **OR** potassium alternative oral replacement **OR** potassium chloride, glucose, dextrose, glucagon (rDNA), dextrose    Allergies  is allergic to bactrim [sulfamethoxazole-trimethoprim] and adhesive tape. Family History  family history includes Diabetes in her brother, father, mother, and sister; No Known Problems in her sister; Other in her son; Stroke in her mother. Social History   reports that she has never smoked. She has never used smokeless tobacco.   reports previous alcohol use. reports current drug use. Drugs: Marijuana (Weed) and Cocaine.     Objective     Vitals:  Patient Vitals for the past 8 hrs:   BP Temp Temp src Pulse Resp SpO2   22 0818 -- -- -- -- 20 94 %   22 0714 130/78 100.4 °F (38 °C) Oral 85 20 93 %   22 0345 (!) 100/55 98.1 °F (36.7 °C) Oral 69 24 --   22 0115 -- 98.4 °F (36.9 °C) Oral -- -- --     Average, Min, and Max for last 24 hours Vitals:  TEMPERATURE:  Temp  Av.1 °F (37.3 °C)  Min: 97.6 °F (36.4 °C)  Max: 101.1 °F (38.4 °C)    RESPIRATIONS RANGE: Resp  Av.3  Min: 16  Max: 24    PULSE RANGE: Pulse  Av.5  Min: 69  Max: 103    BLOOD PRESSURE RANGE:  Systolic (00WMY), Av , Min:100 , CQQ:911   ; Diastolic (18VRJ), WGE:33, Min:55, Max:95      PULSE OXIMETRY RANGE: SpO2  Av.8 %  Min: 93 %  Max: 98 %  I&O:  I/O last 3 completed shifts:  In: -   Out: 600 [Urine:600]    CBC:  Recent Labs     22  1744   WBC 14.9*   HGB 9.1*   HCT 28.9*      .3*        BMP:  Recent Labs     224 22  2206     --    K 4.3  --      --    CO2 23  --    BUN 21*  --    CREATININE 1.03*  --    GLUCOSE 67* 186   CALCIUM 9.0  --         Coags:  Recent Labs     22  1744   PROT 7.2       Lab Results   Component Value Date    LABA1C 10.0 2022     Lab Results   Component Value Date    SEDRATE 122 (H) 10/06/2021     Lab Results   Component Value Date    .3 (H) 2022         Lower Extremity Physical Exam:  Vascular: DP and PT pulses are palpable. CFT <3 seconds to all digits. Hair growth is absent to the level of the digits. Neuro: Saph/sural/SP/DP/plantar sensation diminished to light touch. Musculoskeletal: Muscle strength is 5/5 to all lower extremity muscle groups. Gross deformity is status post left 4th and 5th digit amputation. Tenderness to palpation to previous entry point of injury on the right foot and hyperkeratotic tissue on the left foot. Dermatologic:   Diffuse edema noted throughout the right lower extremity with moderate erythema and increased warmth. Pre-debridement:   Right foot: Epithelized pinpoint lesion noted to the medial arch from previous injury. A large superficial abscess noted to medial and distal to the lesion. Left foot: Hyperkeratotic tissue noted to the lateral plantar foot. Thre is purulence noted underneath the tissue. Post-debridement:   Right foot: full thickness ulcer #1 located right medial arch and measures approximately 2cm x 1cm x 0.4cm. Base is fibrotic. Sloughing skin noted to drained abscess noted above. Purulent drainage noted with no associated mal odor.   Does not probe to bone but probe deep to subcutaneous layer. Tracking noted laterally  No fluctuance, crepitus, or induration. Interdigital maceration absent. Left foot: Full thickness ulcer #2 located lateral plantar left foot and measures approximately 1cmx 1cmx0.2cm. Base is fibrotic. Small amount of purulent drainage noted with no associated mal odor. Does not probe to bone, sinus track, or undermine. No fluctuance, crepitus, or induration. Interdigital maceration absent. Clinical Images:  Pre-debridement          Post-debridement               Imaging:   XR FOOT RIGHT (MIN 3 VIEWS)   Final Result   Right foot: No radiopaque foreign body. No fracture. Moderate soft tissue   swelling within the anterior aspect of plantar aspect of the foot   predominantly underlying meta tarsal heads. Left foot: Changes related to prior partial amputation of 4th and 5th ray as   described. No acute fracture. No radiopaque foreign body. Moderate soft   tissue swelling and plantar aspect of the foot. No discrete soft tissue ulcer   is noted. XR FOOT LEFT (MIN 3 VIEWS)   Final Result   Right foot: No radiopaque foreign body. No fracture. Moderate soft tissue   swelling within the anterior aspect of plantar aspect of the foot   predominantly underlying meta tarsal heads. Left foot: Changes related to prior partial amputation of 4th and 5th ray as   described. No acute fracture. No radiopaque foreign body. Moderate soft   tissue swelling and plantar aspect of the foot. No discrete soft tissue ulcer   is noted. VL DUP LOWER EXTREMITY VENOUS BILATERAL    (Results Pending)       Cultures: obtained     Assessment     Gordo Sibley is a 49 y.o. female with   Diabetic foot ulcer to subcutaneous layer, bilateral feet  Cellulitis, bilateral feet  Superficial abscess, bilateral feet  Poorly uncontrolled DM2 with peripheral neuropathy.      Active Problems:    Right foot infection  Resolved Problems:    * No resolved hospital problems. *        Plan     Patient examined and evaluated at bedside   Treatment options discussed in detail with the patient. A bedside I&D was performed to right foot. And debridement of wound performed on the left foot. Please see procedure note below. Culture obtained  Antibiotic: Clindamycin per primary  Medical management per primary. MRI bilateral feet ordered. Rule out deep abscess vs. Osteomylitis. DVT scan result pending. Patient's right foot wound from stepping glass was never healed due to her poorly controlled diabetes and peripheral neuropathy. No acute surgical intervention plan at this point. MRI result needed for further evaluation. Although patient does not recall trauma to the left foot, it is likely she step on something causing the wound given her neuropathic status. Dressing applied to Bilateral foot: iodoform packing, dsd, ace  WBAT to Bilateral lower extremity  Will discuss with Dr. Dev Honeycutt       Procedure 1: Patient understands that I&D of righ foot needs to be performed. Patient agrees to procedure. 3cc of 1% lidocaine plain was used for a local block. A stab incision was made with #15 blade over apex of the abscess to the level of subcutaneous tissue. The tissue planes were dilated using a hemostat and any loculations were freed. Abscess tracked laterally . Roughly 5cc purulence was expressed. The site was irrigated with sterile saline then left open to drain. Site packed with 1/4\" iodoform, dry sterile dressing applied. Hemostasis spontaneous with direct pressure and packing. Reports 5/10 post debridement pain. Patient tolerated procedure well. Procedure 2:  Wound Location: left plantar foot  The wound(s) was excisionally debrided sharply of fibrotic, necrotic, and hyperkeratotic tissue, including a layer of surrounding healthy tissue using #15 blade scalpel. The wound was debrided down through and including subcutaneous.   Percent of Wound Debrided: 100%  Total Surface Area Debrided:  (see above for sq cm)  Bleeding: Minimal  Patient tolerated procedure well and was given proper instruction. Post debridement wound description:  purulent drainage  Post debridement Wound Location:left plantar foot      Niall Du DPM   Podiatric Medicine & Surgery   1/23/2022 at 9:04 AM      I performed a history and physical examination of the patient and discussed management with the resident. I reviewed the residents note and agree with the documented findings and plan of care. Any areas of disagreement are noted on the chart. I was personally present for the key portions of any procedures. I have documented in the chart those procedures where I was not present during the key portions. I have reviewed the Podiatry Resident progress note. I agree with the chief complaint, past medical history, past surgical history, allergies, medications, social and family history as documented unless otherwise noted below. Documentation of the HPI, Physical Exam and Medical Decision Making performed by medical students or scribes is based on my personal performance of the HPI, PE and MDM. I have personally evaluated this patient and have completed at least one if not all key elements of the E/M (history, physical exam, and MDM). Additional findings are as noted.      Electronically signed by Nida Singer DPM  1/23/2022

## 2022-01-23 NOTE — ED NOTES
Lost IV access. Attempting with US. [place], VINOD at bedside.       Esthela Guo, MAREK  57/61/85 2049

## 2022-01-23 NOTE — PROGRESS NOTES
Physical Therapy        Physical Therapy Cancel Note      DATE: 2022    NAME: Jay Gould  MRN: [id number]   : 1965      Patient not seen this date for Physical Therapy due to:    Testing: Dopplers ordered  ck       Electronically signed by Terence Cortez PT on 2022 at 3:01 PM

## 2022-01-23 NOTE — PROGRESS NOTES
Blood sugar 170. Pt asymptomatic and will not be given insulin due to NPO orders. Will continue to monitor. Pt arrived from the ER with no IV access. Report received was that 6 IV access had gone bad in the ER. Pt agreeable to another  IV attempt. Attempt unsuccessful. House sup notified and will come by to try. Will continue to monitor.

## 2022-01-23 NOTE — ED NOTES
Pt medicated with night time meds. Pt showing no s/s of distress and continues to have BLE leg pain. Writer will cotinine to monitor.       Virginie Sharma LPN  59/06/38 [id number]

## 2022-01-24 ENCOUNTER — CARE COORDINATION (OUTPATIENT)
Dept: CARE COORDINATION | Age: 57
End: 2022-01-24

## 2022-01-24 LAB
-: ABNORMAL
ABSOLUTE EOS #: 0.22 K/UL (ref 0–0.44)
ABSOLUTE IMMATURE GRANULOCYTE: 0.11 K/UL (ref 0–0.3)
ABSOLUTE LYMPH #: 3.05 K/UL (ref 1.1–3.7)
ABSOLUTE MONO #: 1.53 K/UL (ref 0.1–1.2)
AMORPHOUS: ABNORMAL
ANION GAP SERPL CALCULATED.3IONS-SCNC: 10 MMOL/L (ref 9–17)
BACTERIA: ABNORMAL
BASOPHILS # BLD: 1 % (ref 0–2)
BASOPHILS ABSOLUTE: 0.11 K/UL (ref 0–0.2)
BILIRUBIN URINE: NEGATIVE
BUN BLDV-MCNC: 19 MG/DL (ref 6–20)
BUN/CREAT BLD: ABNORMAL (ref 9–20)
CALCIUM SERPL-MCNC: 8.1 MG/DL (ref 8.6–10.4)
CASTS UA: ABNORMAL /LPF (ref 0–8)
CHLORIDE BLD-SCNC: 105 MMOL/L (ref 98–107)
CO2: 20 MMOL/L (ref 20–31)
COLOR: YELLOW
COMMENT UA: ABNORMAL
CREAT SERPL-MCNC: 0.89 MG/DL (ref 0.5–0.9)
CRYSTALS, UA: ABNORMAL /HPF
DIFFERENTIAL TYPE: ABNORMAL
EOSINOPHILS RELATIVE PERCENT: 2 % (ref 1–4)
EPITHELIAL CELLS UA: ABNORMAL /HPF (ref 0–5)
GFR AFRICAN AMERICAN: >60 ML/MIN
GFR NON-AFRICAN AMERICAN: >60 ML/MIN
GFR SERPL CREATININE-BSD FRML MDRD: ABNORMAL ML/MIN/{1.73_M2}
GFR SERPL CREATININE-BSD FRML MDRD: ABNORMAL ML/MIN/{1.73_M2}
GLUCOSE BLD-MCNC: 167 MG/DL (ref 65–105)
GLUCOSE BLD-MCNC: 200 MG/DL (ref 70–99)
GLUCOSE BLD-MCNC: 213 MG/DL (ref 65–105)
GLUCOSE BLD-MCNC: 236 MG/DL (ref 65–105)
GLUCOSE BLD-MCNC: 358 MG/DL (ref 65–105)
GLUCOSE URINE: ABNORMAL
HCT VFR BLD CALC: 26 % (ref 36.3–47.1)
HEMOGLOBIN: 8.1 G/DL (ref 11.9–15.1)
IMMATURE GRANULOCYTES: 1 %
INR BLD: 1
KETONES, URINE: NEGATIVE
LEUKOCYTE ESTERASE, URINE: ABNORMAL
LYMPHOCYTES # BLD: 28 % (ref 24–43)
MCH RBC QN AUTO: 31.6 PG (ref 25.2–33.5)
MCHC RBC AUTO-ENTMCNC: 31.2 G/DL (ref 28.4–34.8)
MCV RBC AUTO: 101.6 FL (ref 82.6–102.9)
MONOCYTES # BLD: 14 % (ref 3–12)
MORPHOLOGY: NORMAL
MUCUS: ABNORMAL
NITRITE, URINE: POSITIVE
NRBC AUTOMATED: 0 PER 100 WBC
OTHER OBSERVATIONS UA: ABNORMAL
PDW BLD-RTO: 13.6 % (ref 11.8–14.4)
PH UA: 6 (ref 5–8)
PLATELET # BLD: 343 K/UL (ref 138–453)
PLATELET ESTIMATE: ABNORMAL
PMV BLD AUTO: 10.7 FL (ref 8.1–13.5)
POTASSIUM SERPL-SCNC: 4.4 MMOL/L (ref 3.7–5.3)
PROTEIN UA: NEGATIVE
PROTHROMBIN TIME: 10.7 SEC (ref 9.1–12.3)
RBC # BLD: 2.56 M/UL (ref 3.95–5.11)
RBC # BLD: ABNORMAL 10*6/UL
RBC UA: ABNORMAL /HPF (ref 0–4)
RENAL EPITHELIAL, UA: ABNORMAL /HPF
SEG NEUTROPHILS: 54 % (ref 36–65)
SEGMENTED NEUTROPHILS ABSOLUTE COUNT: 5.88 K/UL (ref 1.5–8.1)
SODIUM BLD-SCNC: 135 MMOL/L (ref 135–144)
SPECIFIC GRAVITY UA: 1.02 (ref 1–1.03)
TRICHOMONAS: ABNORMAL
TURBIDITY: CLEAR
URINE HGB: NEGATIVE
UROBILINOGEN, URINE: NORMAL
WBC # BLD: 10.9 K/UL (ref 3.5–11.3)
WBC # BLD: ABNORMAL 10*3/UL
WBC UA: ABNORMAL /HPF (ref 0–5)
YEAST: ABNORMAL

## 2022-01-24 PROCEDURE — 6370000000 HC RX 637 (ALT 250 FOR IP): Performed by: STUDENT IN AN ORGANIZED HEALTH CARE EDUCATION/TRAINING PROGRAM

## 2022-01-24 PROCEDURE — 97166 OT EVAL MOD COMPLEX 45 MIN: CPT

## 2022-01-24 PROCEDURE — 85025 COMPLETE CBC W/AUTO DIFF WBC: CPT

## 2022-01-24 PROCEDURE — 80048 BASIC METABOLIC PNL TOTAL CA: CPT

## 2022-01-24 PROCEDURE — 82947 ASSAY GLUCOSE BLOOD QUANT: CPT

## 2022-01-24 PROCEDURE — 97162 PT EVAL MOD COMPLEX 30 MIN: CPT

## 2022-01-24 PROCEDURE — 1200000000 HC SEMI PRIVATE

## 2022-01-24 PROCEDURE — 97530 THERAPEUTIC ACTIVITIES: CPT

## 2022-01-24 PROCEDURE — 87077 CULTURE AEROBIC IDENTIFY: CPT

## 2022-01-24 PROCEDURE — 94640 AIRWAY INHALATION TREATMENT: CPT

## 2022-01-24 PROCEDURE — 97535 SELF CARE MNGMENT TRAINING: CPT

## 2022-01-24 PROCEDURE — 87186 SC STD MICRODIL/AGAR DIL: CPT

## 2022-01-24 PROCEDURE — 2500000003 HC RX 250 WO HCPCS: Performed by: STUDENT IN AN ORGANIZED HEALTH CARE EDUCATION/TRAINING PROGRAM

## 2022-01-24 PROCEDURE — 2580000003 HC RX 258: Performed by: STUDENT IN AN ORGANIZED HEALTH CARE EDUCATION/TRAINING PROGRAM

## 2022-01-24 PROCEDURE — 85610 PROTHROMBIN TIME: CPT

## 2022-01-24 PROCEDURE — 81001 URINALYSIS AUTO W/SCOPE: CPT

## 2022-01-24 PROCEDURE — 6370000000 HC RX 637 (ALT 250 FOR IP)

## 2022-01-24 PROCEDURE — 36415 COLL VENOUS BLD VENIPUNCTURE: CPT

## 2022-01-24 PROCEDURE — 87086 URINE CULTURE/COLONY COUNT: CPT

## 2022-01-24 PROCEDURE — 99233 SBSQ HOSP IP/OBS HIGH 50: CPT | Performed by: FAMILY MEDICINE

## 2022-01-24 RX ORDER — INSULIN GLARGINE 100 [IU]/ML
25 INJECTION, SOLUTION SUBCUTANEOUS 2 TIMES DAILY
Status: DISCONTINUED | OUTPATIENT
Start: 2022-01-24 | End: 2022-01-27

## 2022-01-24 RX ORDER — ALBUTEROL SULFATE 90 UG/1
2 AEROSOL, METERED RESPIRATORY (INHALATION) EVERY 4 HOURS PRN
Status: DISCONTINUED | OUTPATIENT
Start: 2022-01-24 | End: 2022-02-08 | Stop reason: HOSPADM

## 2022-01-24 RX ORDER — LIDOCAINE HYDROCHLORIDE 10 MG/ML
5 INJECTION, SOLUTION EPIDURAL; INFILTRATION; INTRACAUDAL; PERINEURAL ONCE
Status: DISCONTINUED | OUTPATIENT
Start: 2022-01-24 | End: 2022-01-27

## 2022-01-24 RX ADMIN — FLUTICASONE PROPIONATE 1 SPRAY: 50 SPRAY, METERED NASAL at 08:25

## 2022-01-24 RX ADMIN — OXYCODONE HYDROCHLORIDE 5 MG: 5 TABLET ORAL at 20:02

## 2022-01-24 RX ADMIN — PREGABALIN 200 MG: 100 CAPSULE ORAL at 08:26

## 2022-01-24 RX ADMIN — OXYCODONE HYDROCHLORIDE 5 MG: 5 TABLET ORAL at 15:30

## 2022-01-24 RX ADMIN — CLINDAMYCIN IN 5 PERCENT DEXTROSE 600 MG: 12 INJECTION, SOLUTION INTRAVENOUS at 11:53

## 2022-01-24 RX ADMIN — CLINDAMYCIN IN 5 PERCENT DEXTROSE 600 MG: 12 INJECTION, SOLUTION INTRAVENOUS at 03:37

## 2022-01-24 RX ADMIN — BUDESONIDE AND FORMOTEROL FUMARATE DIHYDRATE 2 PUFF: 80; 4.5 AEROSOL RESPIRATORY (INHALATION) at 08:31

## 2022-01-24 RX ADMIN — VENLAFAXINE HYDROCHLORIDE 150 MG: 150 CAPSULE, EXTENDED RELEASE ORAL at 08:25

## 2022-01-24 RX ADMIN — DICYCLOMINE HYDROCHLORIDE 10 MG: 10 CAPSULE ORAL at 08:25

## 2022-01-24 RX ADMIN — DICYCLOMINE HYDROCHLORIDE 10 MG: 10 CAPSULE ORAL at 20:15

## 2022-01-24 RX ADMIN — INSULIN LISPRO 10 UNITS: 100 INJECTION, SOLUTION INTRAVENOUS; SUBCUTANEOUS at 11:53

## 2022-01-24 RX ADMIN — INSULIN GLARGINE 20 UNITS: 100 INJECTION, SOLUTION SUBCUTANEOUS at 08:23

## 2022-01-24 RX ADMIN — OXYCODONE HYDROCHLORIDE 5 MG: 5 TABLET ORAL at 12:04

## 2022-01-24 RX ADMIN — ACETAMINOPHEN 650 MG: 325 TABLET ORAL at 20:15

## 2022-01-24 RX ADMIN — INSULIN GLARGINE 25 UNITS: 100 INJECTION, SOLUTION SUBCUTANEOUS at 20:24

## 2022-01-24 RX ADMIN — INSULIN LISPRO 4 UNITS: 100 INJECTION, SOLUTION INTRAVENOUS; SUBCUTANEOUS at 17:50

## 2022-01-24 RX ADMIN — PREGABALIN 200 MG: 100 CAPSULE ORAL at 20:15

## 2022-01-24 RX ADMIN — OXYCODONE HYDROCHLORIDE 5 MG: 5 TABLET ORAL at 07:59

## 2022-01-24 RX ADMIN — MIRTAZAPINE 7.5 MG: 15 TABLET, FILM COATED ORAL at 20:15

## 2022-01-24 RX ADMIN — SODIUM CHLORIDE: 9 INJECTION, SOLUTION INTRAVENOUS at 21:45

## 2022-01-24 RX ADMIN — INSULIN LISPRO 2 UNITS: 100 INJECTION, SOLUTION INTRAVENOUS; SUBCUTANEOUS at 20:24

## 2022-01-24 RX ADMIN — INSULIN LISPRO 2 UNITS: 100 INJECTION, SOLUTION INTRAVENOUS; SUBCUTANEOUS at 08:23

## 2022-01-24 RX ADMIN — ALBUTEROL SULFATE 2 PUFF: 90 AEROSOL, METERED RESPIRATORY (INHALATION) at 20:16

## 2022-01-24 RX ADMIN — CLINDAMYCIN IN 5 PERCENT DEXTROSE 600 MG: 12 INJECTION, SOLUTION INTRAVENOUS at 20:02

## 2022-01-24 RX ADMIN — TRAZODONE HYDROCHLORIDE 150 MG: 100 TABLET ORAL at 20:15

## 2022-01-24 ASSESSMENT — ENCOUNTER SYMPTOMS
VOMITING: 0
NAUSEA: 0
DIARRHEA: 0
EYE DISCHARGE: 0
APNEA: 0
ABDOMINAL PAIN: 1
COLOR CHANGE: 0
COLOR CHANGE: 1
BACK PAIN: 0
CHEST TIGHTNESS: 0
ABDOMINAL DISTENTION: 0
CONSTIPATION: 0
COUGH: 0
SHORTNESS OF BREATH: 0

## 2022-01-24 ASSESSMENT — PAIN SCALES - GENERAL
PAINLEVEL_OUTOF10: 10
PAINLEVEL_OUTOF10: 8
PAINLEVEL_OUTOF10: 10
PAINLEVEL_OUTOF10: 8
PAINLEVEL_OUTOF10: 10

## 2022-01-24 ASSESSMENT — PAIN DESCRIPTION - PAIN TYPE
TYPE: CHRONIC PAIN;ACUTE PAIN;NEUROPATHIC PAIN
TYPE: ACUTE PAIN

## 2022-01-24 ASSESSMENT — PAIN DESCRIPTION - LOCATION
LOCATION: HAND;FOOT
LOCATION: FOOT
LOCATION: FOOT

## 2022-01-24 ASSESSMENT — PAIN DESCRIPTION - DESCRIPTORS: DESCRIPTORS: ACHING;BURNING;DISCOMFORT

## 2022-01-24 ASSESSMENT — PAIN DESCRIPTION - ORIENTATION
ORIENTATION: RIGHT;LEFT
ORIENTATION: RIGHT;LEFT

## 2022-01-24 ASSESSMENT — PAIN DESCRIPTION - ONSET: ONSET: ON-GOING

## 2022-01-24 ASSESSMENT — PAIN DESCRIPTION - FREQUENCY: FREQUENCY: CONTINUOUS

## 2022-01-24 NOTE — CARE COORDINATION
Initial Contact Social Work Note - Ambulatory  1/24/2022        Identified Needs:  [employer]      .     Social Work Plan:   [facility]. phoned [employer] to cancel patients transportation for 01/26/22    and 01/27/22  Ray Blackburn    Next Steps: [facility] will assist patient with other transportation needs        Goals Addressed    None

## 2022-01-24 NOTE — PROGRESS NOTES
Physician Progress Note      PATIENT:               Mattie Stoll  CSN #:                  [id number]  :                       1965  ADMIT DATE:       2022 4:43 PM  DISCH DATE:  RESPONDING  PROVIDER #:        Benoit Brito          QUERY TEXT:    Pt admitted with diabetic right foot wound. Pt noted to have fever with   leukocytosis. If possible, please document if you are evaluating and /or   treating any of the following: The medical record reflects the following:  Risk Factors: Uncontrolled DM2 wit peripheral neuropathy. Stepped on glass 1   week ago. Clinical Indicators: HgbA1C 10.0 WBC 14.9 and 15.0 Temp 38.4 .3 wound   culture showing many gram positive cocci in pairs. Serum glucose  with   POC Glucose . Per ED physician note: Blistering and underlying purulence   on the arch of the right foot. Treatment: IV Cleocin, Podiatry consult, Incision and Debridement per   Podiatry, Humalog SS, Lyrica, labs/monitoring    Thank-you,  Carl Ludwig RN, [email or web address]  Options provided:  -- Sepsis, present on admission, now resolved  -- Sepsis, present on admission  -- Diabetic right foot infection without Sepsis  -- Other - I will add my own diagnosis  -- Disagree - Not applicable / Not valid  -- Disagree - Clinically unable to determine / Unknown  -- Refer to Clinical Documentation Reviewer    PROVIDER RESPONSE TEXT:    This patient has a diabetic right foot infection without Sepsis.     Query created by: Don Hitchcock on 2022 7:11 AM      Electronically signed by:  Benoit Brito 2022 7:14 AM

## 2022-01-24 NOTE — PROGRESS NOTES
Physical Therapy    Facility/Department: [address] ORTHO/MED SURG  Initial Assessment    NAME: Hannah Zaldivar  : 1965  MRN: [id number]    Date of Service: 2022  Chief Complaint   Patient presents with    Abdominal Pain    Foot Pain     Discharge Recommendations:  Patient would benefit from continued therapy after discharge   Assessment   Body structures, Functions, Activity limitations: Decreased functional mobility ; Decreased strength;Decreased endurance; Increased pain  Assessment: The pt ambulated 15 ft with a RW x mod assist with  WBAT B LE's. She reported increased pain in both of her LE's with ambulation. She could benefit with a continuation of PT to address her deficits  Prognosis: Good  Decision Making: Medium Complexity  PT Education: Goals;PT Role;Plan of Care  REQUIRES PT FOLLOW UP: Yes  Activity Tolerance  Activity Tolerance: Patient limited by fatigue;Patient limited by endurance; Patient limited by pain       Patient Diagnosis(es): The primary encounter diagnosis was Right foot infection. A diagnosis of Elevated d-dimer was also pertinent to this visit.      has a past medical history of Acid reflux, Acute cystitis with hematuria, Acute non-recurrent maxillary sinusitis, Asthma, Bipolar 1 disorder ([address] Bipolar disorder, mixed ([address] BMI 34.0-34.9,adult, Cannabis use disorder, severe, dependence ([address] Cerebrovascular accident (CVA) ([address] Chest pain, Chronic renal insufficiency, Cocaine abuse ([address] COVID-19 virus RNA test result unknown, DDD (degenerative disc disease), cervical, Diabetes mellitus ([address] Dizziness, Fibromyalgia, History of stroke, Homicidal ideation, Hyperglycemia, Hypertension, Hypotension, IDDM (insulin dependent diabetes mellitus), Lupus ([address] Migraine, Neuropathy, Neuropathy, Polysubstance abuse ([address] Post traumatic stress disorder (PTSD), Posttraumatic stress disorder, Recurrent depression ([address] Recurrent major depressive disorder, in partial remission ([address] Screening mammogram, encounter for, Severe recurrent major depression with psychotic features ([facility] Severe recurrent major depression without psychotic features ([address] Stroke (cerebrum) ([facility] Stroke ([address] Suicidal ideation, Suicidal intent, Vitamin D deficiency, and White matter changes. has a past surgical history that includes Abscess Drainage; chest tube insertion; Abdomen surgery; Cataract removal with implant (Bilateral); LASIK (Bilateral); Finger amputation (Left, 03/13/2021); Hand surgery (Right, 09/14/2021); Hand surgery (Right, 09/15/2021); Finger amputation (Right, 10/11/2021); and Finger amputation (Right, 10/11/2021).     Restrictions  Restrictions/Precautions  Restrictions/Precautions: Weight Bearing,Up as Tolerated  Lower Extremity Weight Bearing Restrictions  Right Lower Extremity Weight Bearing: Weight Bearing As Tolerated  Left Lower Extremity Weight Bearing: Weight Bearing As Tolerated  Vision/Hearing  Vision: Impaired  Vision Exceptions: Wears glasses at all times  Hearing: Within functional limits     Subjective  General  Patient assessed for rehabilitation services?: Yes  Response To Previous Treatment: Not applicable  Family / Caregiver Present: No  Follows Commands: Within Functional Limits  Subjective  Subjective: RN and pt agreeable to PT eval  Pain Screening  Patient Currently in Pain: Yes  Pain Assessment  Pain Assessment: 0-10  Pain Level: 10  Pain Location: Foot  Pain Orientation: Right;Left  Vital Signs  Patient Currently in Pain: Yes     Orientation  Orientation  Overall Orientation Status: Within Normal Limits  Social/Functional History  Social/Functional History  Lives With: Alone  Type of Home: Apartment (4th floor)  Home Layout: One level  Home Access: Elevator,Ramped entrance  Bathroom Shower/Tub: Tub/Shower unit  Bathroom Toilet: Standard  Bathroom Equipment: Shower chair,Grab bars in shower  Home Equipment: Quad cane,Rolling walker  ADL Assistance: Independent  Homemaking Assistance: Independent  Homemaking Responsibilities: Yes  Meal Prep Responsibility: Primary  Laundry Responsibility: Primary (laundry is outside of complex)  Cleaning Responsibility: Primary  Shopping Responsibility: Primary  Ambulation Assistance: Independent  Transfer Assistance: Independent  Active : No  Patient's  Info: Friends and cabs  Mode of Transportation: Cab,Friends,Other (uber)  Occupation: On disability  Leisure & Hobbies: Stuff  Cognition      Objective     AROM RLE (degrees)  RLE AROM: WFL  AROM LLE (degrees)  LLE AROM : WFL  AROM RUE (degrees)  RUE AROM : WNL  AROM LUE (degrees)  LUE AROM : WNL  Strength RLE  Strength RLE: WFL  Strength LLE  Strength LLE: WFL  Strength RUE  Strength RUE: WFL  Strength LUE  Strength LUE: WFL     Sensation  Overall Sensation Status: Impaired (Reports numbness/tingling in B hands and feet)     Transfers  Sit to Stand: Moderate Assistance;2 Person Assistance  Stand to sit: Moderate Assistance;2 Person Assistance  Ambulation  Ambulation?: Yes  Ambulation 1  Surface: level tile  Device: Rolling Walker  Assistance:  Moderate assistance  Distance: amb 15 ft with a RW x mod assist with WBAT B LE's     Balance  Posture: Good  Sitting - Static: Good  Sitting - Dynamic: Fair  Standing - Static: Fair  Standing - Dynamic: [address]  Times per week: 5-6x wk  Current Treatment Recommendations: Strengthening,Functional Mobility Training,Gait Training,Safety Education & Training,Endurance Training,Pain Management  Safety Devices  Type of devices: Nurse notified,Left in bed,Call light within reach    G-Code    OutComes Score     AM-PAC Score  AM-PAC Inpatient Mobility Raw Score : 18 (01/24/22 1225)  AM-PAC Inpatient T-Scale Score : 43.63 (01/24/22 1225)  Mobility Inpatient CMS 0-100% Score: 46.58 (01/24/22 1225)  Mobility Inpatient CMS G-Code Modifier : CK (01/24/22 1225)     Goals  Short term goals  Time Frame for Short term goals: 10 visits  Short term goal 1: transfers with SBA  Short term goal 2: amb 75 ft with a RW x SBA with WBAT B LE's  Short term goal 3: to be independent with bed mobility  Short term goal 4: 20 min exercise program x SBA  Patient Goals   Patient goals : Return home     Therapy Time   Individual Concurrent Group Co-treatment   Time In 9963         Time Out 0818         Minutes 23             [address] of [address], PT

## 2022-01-24 NOTE — PROGRESS NOTES
Occupational Therapy   Occupational Therapy Initial Assessment  Date: 2022   Patient Name: Gordo Sibley  MRN: [id number]     : 1965    Date of Service: 2022  Chief Complaint   Patient presents with    Abdominal Pain    Foot Pain     Discharge Recommendations:  Patient would benefit from continued therapy after discharge  OT Equipment Recommendations  Equipment Needed: Yes  Mobility Devices: ADL Assistive Devices  ADL Assistive Devices: Grab Bars - toilet    Assessment   Performance deficits / Impairments: Decreased functional mobility ; Decreased ADL status; Decreased endurance;Decreased high-level IADLs;Decreased safe awareness;Decreased balance;Decreased strength;Decreased sensation  Assessment: Patient sitting EOB upon arrival, engaged in LB dressing task, requiring Max A for the R LE d/t pain. Pt completed functional transfer at [address] [place] [address] with use of RW, able to complete functional mobility to/from bathroom, limited d/t pain. Pt retired to [facility] with all needs met. Pt functional performance limited d/t pain, decreased balance and strength this date. Patient would benefit from continued acute OT services to address functional deficits through skilled intervention of ADL and IADL compensatory training, balance, safety and transfer training, education of EC/WS techs and implementation, use of AE/DME to increase independence, and strengthening activities to improve function for ADLs to promote functional outcomes. Prognosis: Fair  Decision Making: Medium Complexity  Patient Education: OT role, OT POC, purpose of evaluation, hand placement for transfers, activity promotion, WB precautions, use of RW, adaptive ADL strategies - fair/good return  REQUIRES OT FOLLOW UP: Yes  Activity Tolerance  Activity Tolerance: Treatment limited secondary to agitation;Patient limited by pain  Safety Devices  Safety Devices in place: Yes  Type of devices: Gait belt;Call light within reach; Left in bed;Nurse notified  Restraints  Initially in place: No         Patient Diagnosis(es): The primary encounter diagnosis was Right foot infection. A diagnosis of Elevated d-dimer was also pertinent to this visit. has a past medical history of Acid reflux, Acute cystitis with hematuria, Acute non-recurrent maxillary sinusitis, Asthma, Bipolar 1 disorder ([address] Bipolar disorder, mixed ([address] BMI 34.0-34.9,adult, Cannabis use disorder, severe, dependence ([address] Cerebrovascular accident (CVA) ([address] Chest pain, Chronic renal insufficiency, Cocaine abuse ([address] COVID-19 virus RNA test result unknown, DDD (degenerative disc disease), cervical, Diabetes mellitus ([address] Dizziness, Fibromyalgia, History of stroke, Homicidal ideation, Hyperglycemia, Hypertension, Hypotension, IDDM (insulin dependent diabetes mellitus), Lupus ([address] Migraine, Neuropathy, Neuropathy, Polysubstance abuse ([address] Post traumatic stress disorder (PTSD), Posttraumatic stress disorder, Recurrent depression ([address] Recurrent major depressive disorder, in partial remission ([address] Screening mammogram, encounter for, Severe recurrent major depression with psychotic features ([address] Severe recurrent major depression without psychotic features ([address] Stroke (cerebrum) ([address] Stroke ([address] Suicidal ideation, Suicidal intent, Vitamin D deficiency, and White matter changes. has a past surgical history that includes Abscess Drainage; chest tube insertion; Abdomen surgery; Cataract removal with implant (Bilateral); LASIK (Bilateral); Finger amputation (Left, 03/13/2021); Hand surgery (Right, 09/14/2021); Hand surgery (Right, 09/15/2021); Finger amputation (Right, 10/11/2021); and Finger amputation (Right, 10/11/2021).        Restrictions  Restrictions/Precautions  Restrictions/Precautions: Weight Bearing,Up as Tolerated  Lower Extremity Weight Bearing Restrictions  Right Lower Extremity Weight Bearing: Weight Bearing As Tolerated  Left Lower Extremity Weight Bearing: Weight Bearing As Tolerated    Subjective   General  Patient assessed for rehabilitation services?: Yes  Family / Caregiver Present: No  General Comment  Comments: RN ok'd patient for OT/PT evaluation. Pt pleasant and cooperative throughout. Patient Currently in Pain: Yes  Pain Assessment  Pain Level: 10  Pain Type: Acute pain  Pain Location: Foot  Pain Orientation: Right;Left  Non-Pharmaceutical Pain Intervention(s): Ambulation/Increased Activity;Repositioned; Emotional support  Response to Pain Intervention: Patient Satisfied (RN provided pain medication)  Vital Signs  Pulse: 64  Heart Rate Source: Radial  Resp: 16  BP: 117/62  BP Location: Right upper arm  MAP (mmHg): 77  Patient Position: Semi fowlers  Level of Consciousness: Alert (0)  Patient Currently in Pain: Yes  Oxygen Therapy  SpO2: 93 %  O2 Device: None (Room air)    Social/Functional History  Social/Functional History  Lives With: Alone  Type of Home: Apartment (4th floor)  Home Layout: One level  Home Access: Elevator,Ramped entrance  Bathroom Shower/Tub: Tub/Shower unit  Bathroom Toilet: Standard  Bathroom Equipment: Shower chair,Grab bars in shower  Home Equipment: Quad cane,Rolling walker  ADL Assistance: Independent  Homemaking Assistance: Independent  Homemaking Responsibilities: Yes  Meal Prep Responsibility: Primary  Laundry Responsibility: Primary (laundry is outside of complex)  Cleaning Responsibility: Primary  Shopping Responsibility: Primary  Ambulation Assistance: Independent  Transfer Assistance: Independent  Active : No  Mode of Transportation: Cab,Friends,Other (uber)  Occupation: On disability  Leisure & Hobbies: Stuff     Objective   Vision: Impaired  Vision Exceptions: Wears glasses at all times  Hearing: Within functional limits          Balance  Sitting Balance: Independent (EOB for ~18 min, intermittent static/dynamic task engagement)  Standing Balance: Stand by assistance  Standing Balance  Time: ~1-2 min  Activity: standing EOB  Comment: using RW for support  Functional Mobility  Functional - Mobility Device: Rolling Walker  Activity: To/from bathroom  Assist Level: Contact guard assistance  ADL  Feeding: Independent  Grooming: Independent  UE Bathing: Independent  LE Bathing: Minimal assistance; Increased time to complete  UE Dressing: Independent  LE Dressing: Minimal assistance; Increased time to complete  Toileting: Contact guard assistance; Increased time to complete  Additional Comments: Pt sat EOB, engaging in self-feeding independently upon arrival; Pt donned sock on the L LE using figure four method at [facility], requiring verbal encouragement for participation. Pt required Max A for donning the R LE d/t pain reporting increased pain with touch of the extremity.   Tone RUE  RUE Tone: Normotonic  Tone LUE  LUE Tone: Normotonic  Coordination  Movements Are Fluid And Coordinated: Yes     Bed mobility  Comment: EOB upon arrival, retired sitting EOB at end of session  Transfers  Sit to stand: Minimal assistance  Stand to sit: Contact guard assistance  Transfer Comments: x2 transfers completed; CGA for x1, unable to achieve full stand; improvement with Min A to bring UE's to RW to provide support for offloading the R LE     Cognition  Overall Cognitive Status: Exceptions  Safety Judgement: Decreased awareness of need for assistance;Decreased awareness of need for safety               LUE AROM : WFL  Left Hand AROM: WFL  RUE AROM : WFL  Right Hand AROM: WFL  LUE Strength  Gross LUE Strength: Exceptions to [facility]  L Shoulder Flex: 4-/5  L Elbow Flex: 4-/5  L Elbow Ext: 4-/5  L Hand General: 4-/5  RUE Strength  Gross RUE Strength: Exceptions to [facility]  R Shoulder Flex: 4-/5  R Elbow Flex: 4-/5  R Elbow Ext: 4-/5  R Hand General: 4-/5              Plan   Plan  Times per week: 3-4x/wk  Current Treatment Recommendations: Strengthening,Endurance Training,Equipment Evaluation, Education, & procurement,Self-Care / ADL,Patient/Caregiver Education & Training,Home Management Training,Safety Education & Training,Functional Mobility Training,Balance Training    AM-PAC Score        AM-PAC Inpatient Daily Activity Raw Score: 21 (01/24/22 1141)  AM-PAC Inpatient ADL T-Scale Score : 44.27 (01/24/22 1141)  ADL Inpatient CMS 0-100% Score: 32.79 (01/24/22 1141)  ADL Inpatient CMS G-Code Modifier : Osmin Lui (01/24/22 1141)    Goals  Short term goals  Time Frame for Short term goals: Patient will, by discharge  Short term goal 1: demo LB ADLs at Mod I using AE PRN  Short term goal 2: demo toileting tasks at Supervision using AE PRN  Short term goal 3: demo functional transfers/mobility using LRD at SBA to engage in ADLs  Short term goal 4: demo 8+ min of dynamic standing tolerance with uni/bilateral hand release at SBA to engage in ADLs     Therapy Time   Individual Concurrent Group Co-treatment   Time In 0752         Time Out 0813         Minutes 21         Timed Code Treatment Minutes: [place], OTR/L

## 2022-01-24 NOTE — PROGRESS NOTES
[facility]  Progress Note    Date:   1/24/2022  Patient name:  Anai Pretty  Date of admission:  1/22/2022  4:43 PM  MRN:   [id number]  YOB: 1965    SUBJECTIVE/Last 24 hours update:     Day 2 Hospitalization:   Patient was seen and examined at the bedside. No acute events overnight. S/P I&D of right foot and left foot debridement. Febrile over night, T max 101.1. Feet pain controlled with pain meds. Complains of right thigh pain and bulge. On  Exam: palpable lymph node. Reports lower abdominal pain associated with burning urination. Will get UA. Denies chills, chest pain, SOB, nausea, vomiting. REVIEW OF SYSTEMS:      Review of Systems   Constitutional: Negative for activity change, appetite change, chills, fatigue and fever. HENT: Negative. Respiratory: Negative for cough, chest tightness and shortness of breath. Cardiovascular: Negative for chest pain, palpitations and leg swelling. Gastrointestinal: Positive for abdominal pain. Negative for constipation, diarrhea, nausea and vomiting. Genitourinary: Positive for dysuria. Negative for decreased urine volume, difficulty urinating, frequency and hematuria. Musculoskeletal: Negative for arthralgias, back pain and neck pain. Skin: Negative for color change. Neurological: Negative for dizziness, weakness, light-headedness, numbness and headaches.        PAST MEDICAL HISTORY:      has a past medical history of Acid reflux, Acute cystitis with hematuria, Acute non-recurrent maxillary sinusitis, Asthma, Bipolar 1 disorder ([address] Bipolar disorder, mixed ([address] BMI 34.0-34.9,adult, Cannabis use disorder, severe, dependence ([address] Cerebrovascular accident (CVA) ([address] Chest pain, Chronic renal insufficiency, Cocaine abuse ([address] COVID-19 virus RNA test result unknown, DDD (degenerative disc disease), cervical, Diabetes mellitus ([address] Dizziness, Fibromyalgia, History of stroke, Homicidal ideation, Hyperglycemia, Hypertension, Hypotension, IDDM (insulin dependent diabetes mellitus), Lupus ([facility] Migraine, Neuropathy, Neuropathy, Polysubstance abuse ([facility] [address] Post traumatic stress disorder (PTSD), Posttraumatic stress disorder, Recurrent depression ([facility] [address] Recurrent major depressive disorder, in partial remission ([facility] [address] [facility] Screening mammogram, encounter for, Severe recurrent major depression with psychotic features ([facility] [address] Severe recurrent major depression without psychotic features ([facility] [address] Stroke (cerebrum) ([facility] [address] Stroke ([facility] [address] [facility] Suicidal ideation, Suicidal intent, Vitamin D deficiency, and White matter changes. PAST SURGICAL HISTORY:      has a past surgical history that includes Abscess Drainage; chest tube insertion; Abdomen surgery; Cataract removal with implant (Bilateral); LASIK (Bilateral); Finger amputation (Left, 03/13/2021); Hand surgery (Right, 09/14/2021); Hand surgery (Right, 09/15/2021); Finger amputation (Right, 10/11/2021); and Finger amputation (Right, 10/11/2021). SOCIALHISTORY:      reports that she has never smoked. She has never used smokeless tobacco. She reports previous alcohol use. She reports current drug use. Drugs: Marijuana (Weed) and Cocaine. FAMILY HISTORY:      family history includes Diabetes in her brother, father, mother, and sister; No Known Problems in her sister; Other in her son; Stroke in her mother. HOME MEDICATIONS:      Prior to Admission medications    Medication Sig Start Date End Date Taking? Authorizing Provider   pregabalin (LYRICA) 200 MG capsule Take 1 capsule by mouth 2 times daily for 30 days.  1/20/22 2/19/22  Jessica Chow MD   insulin glargine (LANTUS SOLOSTAR) 100 UNIT/ML injection pen Inject 30 Units into the skin 2 times daily 1/20/22   Jessica Chow MD   doxycycline hyclate (VIBRA-TABS) 100 MG tablet Take 1 tablet by mouth 2 times daily for 7 days 1/20/22 1/27/22  Jessica Chow MD   fluticasone (FLONASE) 50 MCG/ACT nasal spray 1 spray by Each Nostril route daily 12/22/21   Génesis Porras MD   acetaminophen (TYLENOL) 500 MG tablet Take 2 tablets by mouth 3 times daily as needed for Pain 12/22/21   Génesis Porras MD   dicyclomine (BENTYL) 10 MG capsule Take 1 capsule by mouth 4 times daily 12/17/21   Génesis Porras MD   metFORMIN (GLUCOPHAGE) 1000 MG tablet Take 1 tablet by mouth 2 times daily (with meals) 12/17/21   Génesis Porras MD   mirtazapine (REMERON) 7.5 MG tablet Take 1 tablet by mouth nightly 12/17/21   Génesis Porras MD   pantoprazole (PROTONIX) 20 MG tablet Take 1 tablet by mouth 2 times daily (before meals) 12/17/21   Génesis Porras MD   traZODone (DESYREL) 150 MG tablet Take 1 tablet by mouth nightly 12/17/21   Génesis Porras MD   venlafaxine (EFFEXOR XR) 150 MG extended release capsule Take 1 capsule by mouth daily (with breakfast) 12/17/21   Génesis Porras MD   ibuprofen (ADVIL;MOTRIN) 800 MG tablet Take 1 tablet by mouth every 8 hours as needed for Pain 11/15/21 11/29/21  Maury Scott MD   Lancets MISC 1 each by Does not apply route 3 times daily 11/15/21   Maury Scott MD   Alcohol Swabs 70 % PADS Use 1 swab 3 times daily as needed 11/15/21   Maury Scott MD   blood glucose monitor strips Test 3 times a day & as needed for symptoms of irregular blood glucose. Dispense sufficient amount for indicated testing frequency plus additional to accommodate PRN testing needs.  11/8/21   Margarete Angelucci, MD   Lancets MISC 1 each by Does not apply route 3 times daily 11/8/21   Margarete Angelucci, MD   Insulin Pen Needle ([employer] PEN NEEDLES 31G) 31G X 8 MM MISC 1 each by Does not apply route daily 11/8/21   Xavi Calderón MD   FREESTYLE LITE strip 1 each by Does not apply route 3 times daily 11/11/20   Génesis Porras MD   albuterol sulfate  (90 Base) MCG/ACT inhaler Inhale 2 puffs into the lungs every 4 hours as needed for Wheezing 10/20/20 9/22/21  Génesis Porras MD   [facility] 110 MCG/ACT inhaler Inhale 2 puffs into the lungs 2 times daily 10/20/20   Caryl Diaz MD   budesonide-formoterol (SYMBICORT) 80-4.5 MCG/ACT AERO Inhale 2 puffs into the lungs 2 times daily 10/20/20   Caryl Diaz MD       ALLERGIES:     Bactrim [sulfamethoxazole-trimethoprim] and Adhesive tape    OBJECTIVE:       Vitals:    01/23/22 0714 01/23/22 0818 01/23/22 1500 01/23/22 1935   BP: 130/78   135/67   Pulse: 85   80   Resp: 20 20 20   Temp: 100.4 °F (38 °C)  100 °F (37.8 °C) 101.1 °F (38.4 °C)   TempSrc: Oral  Oral    SpO2: 93% 94%  98%   Weight:       Height:           No intake or output data in the 24 hours ending 01/24/22 0743    PHYSICAL EXAM:    Physical Exam  Vitals and nursing note reviewed. Constitutional:       General: She is not in acute distress. Appearance: Normal appearance. She is not ill-appearing. HENT:      Head: Normocephalic and atraumatic. Mouth/Throat:      Pharynx: Oropharynx is clear. Cardiovascular:      Rate and Rhythm: Normal rate and regular rhythm. Pulses: Normal pulses. Heart sounds: No murmur heard. Pulmonary:      Effort: Pulmonary effort is normal.      Breath sounds: Normal breath sounds. Abdominal:      Palpations: Abdomen is soft. There is no mass. Tenderness: There is no abdominal tenderness. Musculoskeletal:         General: No tenderness. Normal range of motion. Cervical back: Normal range of motion and neck supple. Right lower leg: Edema present. Left lower leg: Edema present. Neurological:      General: No focal deficit present. Mental Status: She is alert and oriented to person, place, and time. ASSESSMENT:      Active Problems:    Type 2 diabetes mellitus without complication, with long-term current use of insulin (HCC)    Right foot infection    Cellulitis and abscess of toe of right foot  Resolved Problems:    * No resolved hospital problems.  *      PLAN:        RLE foot wound/infection  - Tmax 100.4  - leukocytosis, elevated CRP  - X-ray revealed no foreign body in foot. However moderate right foot swelling.  - S/P I&D of right foot and left foot debridement.    - on IV clindamycin  - pain control  -  ml/hr  - podiatry consulted, follow up recommendations  - elevated D-dime  -Venous doppler shows chronic DVT  -Started on full dose lovenox BID in the ED       DM2  - A1C 10  -POCT glucose  -on lantus 20U BID  - medium dose ISS  - hypoglycemia protocol     Bipolar disorder  - stable  - continue remeron 7.5 mg PO nightly  - effexor  mg po daily  - trazadone 150 mg PO nightly    Plan will be discussed with the attending, Ese Mosley MD  Family Medicine Resident  1/24/2022 7:43 AM

## 2022-01-24 NOTE — PROGRESS NOTES
Progress Note  Podiatric Medicine and Surgery     Subjective     CC: right foot pain    Patient seen and examined at bedside. No acute events overnight. Afebrile, vital signs stable   States foot pain is better    HPI :  Priya Luong is a 49 y.o. female seen at [facility] for right foot pain. Patient stepped on a piece of glass a few days ago. She went to her PCP when PCP removed the glass. She presented to the ED last night due to increased pain and swelling to the right lower extremity. Upon arrival, X-ray was taken which showed no foreign body, acute osseous deformity or soft tissue emphysema. During my evaluation, patient also mentioned pain on her plantar left foot and  pain is not as bad as her right foot. Per chart review, patient visited [facility] podiatry clinic a year ago for diabetic foot care but never follows up after that. 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 Her last A1C in 12/13/2021 is 13.3%. She admits neuropathic pain to bilateral lower extremity. She denies other pedal complaints. ROS: Denies N/V/F/C/SOB/CP. Otherwise negative except at stated in the HPI.      Medications:  Scheduled Meds:   insulin glargine  25 Units SubCUTAneous BID    [START ON 1/25/2022] enoxaparin  30 mg SubCUTAneous BID    clindamycin (CLEOCIN) IV  600 mg IntraVENous Q8H    budesonide-formoterol  2 puff Inhalation BID    dicyclomine  10 mg Oral 4x Daily    fluticasone  1 spray Each Nostril Daily    mirtazapine  7.5 mg Oral Nightly    pantoprazole  20 mg Oral BID AC    pregabalin  200 mg Oral BID    traZODone  150 mg Oral Nightly    venlafaxine  150 mg Oral Daily with breakfast    sodium chloride flush  5-40 mL IntraVENous 2 times per day    insulin lispro  0-12 Units SubCUTAneous TID WC    insulin lispro  0-6 Units SubCUTAneous Nightly       Continuous Infusions:   sodium chloride      sodium chloride 100 mL/hr at 01/22/22 2140    dextrose         PRN Meds:oxyCODONE, sodium chloride flush, sodium chloride, ondansetron **OR** ondansetron, polyethylene glycol, acetaminophen **OR** acetaminophen, potassium chloride **OR** potassium alternative oral replacement **OR** potassium chloride, glucose, dextrose, glucagon (rDNA), dextrose    Objective     Vitals:  Patient Vitals for the past 8 hrs:   BP Temp Temp src Pulse Resp SpO2   22 1126 117/62 98.8 °F (37.1 °C) Axillary 64 16 93 %   22 1039 (!) 115/54 97.7 °F (36.5 °C) Axillary -- 18 92 %   22 0729 -- -- -- -- 16 93 %     Average, Min, and Max for last 24 hours Vitals:  TEMPERATURE:  Temp  Av.4 °F (37.4 °C)  Min: 97.7 °F (36.5 °C)  Max: 101.1 °F (38.4 °C)    RESPIRATIONS RANGE: Resp  Av.5  Min: 16  Max: 20    PULSE RANGE: Pulse  Av  Min: 64  Max: 80    BLOOD PRESSURE RANGE:  Systolic (39YVD), MAS:944 , Min:115 , BLI:098   ; Diastolic (95XOO), PLH:04, Min:54, Max:67      PULSE OXIMETRY RANGE: SpO2  Av %  Min: 92 %  Max: 98 %    I/O last 3 completed shifts:  In: -   Out: 600 [Urine:600]    CBC:  Recent Labs     22  0939 22  0451   WBC 14.9* 15.0* 10.9   HGB 9.1* 8.9* 8.1*   HCT 28.9* 28.5* 26.0*    375 343   .3* 250.9*  --         BMP:  Recent Labs     226 22  0939 22  0451     --   --  135 135   K 4.3  --   --  4.4 4.4     --   --  102 105   CO2 23  --   --  21 20   BUN 21*  --   --  14 19   CREATININE 1.03*  --   --  0.63 0.89   GLUCOSE 67*   < > 186 96 200*   CALCIUM 9.0  --   --  8.7 8.1*    < > = values in this interval not displayed. Coags:  Recent Labs     22  1744 22  0757   PROT 7.2  --    INR  --  1.0       Lab Results   Component Value Date    SEDRATE 124 (H) 2022     Recent Labs     22  0939   .3* 250.9*       Lower Extremity Physical Exam:  Vascular: DP and PT pulses are palpable. CFT <3 seconds to all digits.   Hair growth is absent to study, no   discrete organized fluid collection is seen. 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 VL DUP LOWER EXTREMITY VENOUS BILATERAL   Final Result      XR FOOT RIGHT (MIN 3 VIEWS)   Final Result   Right foot: No radiopaque foreign body. No fracture. Moderate soft tissue   swelling within the anterior aspect of plantar aspect of the foot   predominantly underlying meta tarsal heads. Left foot: Changes related to prior partial amputation of 4th and 5th ray as   described. No acute fracture. No radiopaque foreign body. Moderate soft   tissue swelling and plantar aspect of the foot. No discrete soft tissue ulcer   is noted. XR FOOT LEFT (MIN 3 VIEWS)   Final Result   Right foot: No radiopaque foreign body. No fracture. Moderate soft tissue   swelling within the anterior aspect of plantar aspect of the foot   predominantly underlying meta tarsal heads. Left foot: Changes related to prior partial amputation of 4th and 5th ray as   described. No acute fracture. No radiopaque foreign body. Moderate soft   tissue swelling and plantar aspect of the foot. No discrete soft tissue ulcer   is noted. Cultures: Group A strep. anaerobe contaminated. Mona Peterson is a 49 y.o. female with   1. Diabetic foot ulcer to subcutaneous layer, bilateral feet  2. Cellulitis, bilateral feet  3. Superficial abscess, bilateral feet  4. Poorly uncontrolled DM2 with peripheral neuropathy. 5. S/p bedside I&D right foot    Active Problems:    Type 2 diabetes mellitus without complication, with long-term current use of insulin ([facility])    Right foot infection    Cellulitis and abscess of toe of right foot  Resolved Problems:    * No resolved hospital problems. *       Plan     · Patient examined and evaluated at bedside   · Treatment options discussed in detail with the patient.   · Antibiotic: Clindamycin per primary. Recommend ID consult  · Medical management per primary. · MRI bilateral feet: no definite deep abscess or osteomyelitis. · DVT scan: chronic DVT to RLE. · Dressing applied to right foot: iodoform packing, dsd, ace  · No surgical intervention from podiatry standpoint. We will continue local wound care. There is a small area of color change to the right foot wound concerning for new superficial abscess. Will monitor for another day. Another bedside I&D might be performed tomorrow.    · Dressing applied to left foot: betadine, dsd, ace  · WBAT to Bilateral lower extremity  · Discussed with Dr. Danelle Mariee, DPM   Podiatric Medicine & Surgery   1/24/2022 at 2:50 PM

## 2022-01-25 ENCOUNTER — CARE COORDINATION (OUTPATIENT)
Dept: CARE COORDINATION | Age: 57
End: 2022-01-25

## 2022-01-25 ENCOUNTER — APPOINTMENT (OUTPATIENT)
Dept: CT IMAGING | Age: 57
DRG: 364 | End: 2022-01-25
Payer: COMMERCIAL

## 2022-01-25 ENCOUNTER — APPOINTMENT (OUTPATIENT)
Dept: ULTRASOUND IMAGING | Age: 57
DRG: 364 | End: 2022-01-25
Payer: COMMERCIAL

## 2022-01-25 LAB
ABSOLUTE EOS #: 0.38 K/UL (ref 0–0.44)
ABSOLUTE IMMATURE GRANULOCYTE: 0.13 K/UL (ref 0–0.3)
ABSOLUTE LYMPH #: 3.25 K/UL (ref 1.1–3.7)
ABSOLUTE MONO #: 1.75 K/UL (ref 0.1–1.2)
ANION GAP SERPL CALCULATED.3IONS-SCNC: 9 MMOL/L (ref 9–17)
BASOPHILS # BLD: 1 % (ref 0–2)
BASOPHILS ABSOLUTE: 0.13 K/UL (ref 0–0.2)
BUN BLDV-MCNC: 17 MG/DL (ref 6–20)
BUN/CREAT BLD: ABNORMAL (ref 9–20)
CALCIUM SERPL-MCNC: 8.6 MG/DL (ref 8.6–10.4)
CHLORIDE BLD-SCNC: 106 MMOL/L (ref 98–107)
CO2: 21 MMOL/L (ref 20–31)
CREAT SERPL-MCNC: 0.77 MG/DL (ref 0.5–0.9)
CULTURE: ABNORMAL
DIFFERENTIAL TYPE: ABNORMAL
EOSINOPHILS RELATIVE PERCENT: 3 % (ref 1–4)
GFR AFRICAN AMERICAN: >60 ML/MIN
GFR NON-AFRICAN AMERICAN: >60 ML/MIN
GFR SERPL CREATININE-BSD FRML MDRD: ABNORMAL ML/MIN/{1.73_M2}
GFR SERPL CREATININE-BSD FRML MDRD: ABNORMAL ML/MIN/{1.73_M2}
GLUCOSE BLD-MCNC: 104 MG/DL (ref 65–105)
GLUCOSE BLD-MCNC: 148 MG/DL (ref 70–99)
GLUCOSE BLD-MCNC: 164 MG/DL (ref 65–105)
GLUCOSE BLD-MCNC: 198 MG/DL (ref 65–105)
GLUCOSE BLD-MCNC: 222 MG/DL (ref 65–105)
HCT VFR BLD CALC: 26.3 % (ref 36.3–47.1)
HEMOGLOBIN: 8 G/DL (ref 11.9–15.1)
IMMATURE GRANULOCYTES: 1 %
LYMPHOCYTES # BLD: 26 % (ref 24–43)
Lab: ABNORMAL
MCH RBC QN AUTO: 31.3 PG (ref 25.2–33.5)
MCHC RBC AUTO-ENTMCNC: 30.4 G/DL (ref 28.4–34.8)
MCV RBC AUTO: 102.7 FL (ref 82.6–102.9)
MONOCYTES # BLD: 14 % (ref 3–12)
MORPHOLOGY: NORMAL
NRBC AUTOMATED: 0.2 PER 100 WBC
PDW BLD-RTO: 13.5 % (ref 11.8–14.4)
PLATELET # BLD: 343 K/UL (ref 138–453)
PLATELET ESTIMATE: ABNORMAL
PMV BLD AUTO: 10.6 FL (ref 8.1–13.5)
POTASSIUM SERPL-SCNC: 4 MMOL/L (ref 3.7–5.3)
RBC # BLD: 2.56 M/UL (ref 3.95–5.11)
RBC # BLD: ABNORMAL 10*6/UL
SEG NEUTROPHILS: 55 % (ref 36–65)
SEGMENTED NEUTROPHILS ABSOLUTE COUNT: 6.86 K/UL (ref 1.5–8.1)
SODIUM BLD-SCNC: 136 MMOL/L (ref 135–144)
SPECIMEN DESCRIPTION: ABNORMAL
WBC # BLD: 12.5 K/UL (ref 3.5–11.3)
WBC # BLD: ABNORMAL 10*3/UL

## 2022-01-25 PROCEDURE — 73701 CT LOWER EXTREMITY W/DYE: CPT

## 2022-01-25 PROCEDURE — 6370000000 HC RX 637 (ALT 250 FOR IP): Performed by: STUDENT IN AN ORGANIZED HEALTH CARE EDUCATION/TRAINING PROGRAM

## 2022-01-25 PROCEDURE — 36415 COLL VENOUS BLD VENIPUNCTURE: CPT

## 2022-01-25 PROCEDURE — 6360000002 HC RX W HCPCS: Performed by: INTERNAL MEDICINE

## 2022-01-25 PROCEDURE — 99233 SBSQ HOSP IP/OBS HIGH 50: CPT | Performed by: FAMILY MEDICINE

## 2022-01-25 PROCEDURE — 76770 US EXAM ABDO BACK WALL COMP: CPT

## 2022-01-25 PROCEDURE — 99254 IP/OBS CNSLTJ NEW/EST MOD 60: CPT | Performed by: INTERNAL MEDICINE

## 2022-01-25 PROCEDURE — 82947 ASSAY GLUCOSE BLOOD QUANT: CPT

## 2022-01-25 PROCEDURE — 2500000003 HC RX 250 WO HCPCS: Performed by: STUDENT IN AN ORGANIZED HEALTH CARE EDUCATION/TRAINING PROGRAM

## 2022-01-25 PROCEDURE — 2580000003 HC RX 258: Performed by: STUDENT IN AN ORGANIZED HEALTH CARE EDUCATION/TRAINING PROGRAM

## 2022-01-25 PROCEDURE — 80048 BASIC METABOLIC PNL TOTAL CA: CPT

## 2022-01-25 PROCEDURE — 1200000000 HC SEMI PRIVATE

## 2022-01-25 PROCEDURE — 6360000004 HC RX CONTRAST MEDICATION: Performed by: PODIATRIST

## 2022-01-25 PROCEDURE — 85025 COMPLETE CBC W/AUTO DIFF WBC: CPT

## 2022-01-25 PROCEDURE — 6360000002 HC RX W HCPCS: Performed by: STUDENT IN AN ORGANIZED HEALTH CARE EDUCATION/TRAINING PROGRAM

## 2022-01-25 PROCEDURE — 97110 THERAPEUTIC EXERCISES: CPT

## 2022-01-25 PROCEDURE — 2580000003 HC RX 258: Performed by: INTERNAL MEDICINE

## 2022-01-25 RX ADMIN — IOPAMIDOL 75 ML: 755 INJECTION, SOLUTION INTRAVENOUS at 17:54

## 2022-01-25 RX ADMIN — OXYCODONE HYDROCHLORIDE 5 MG: 5 TABLET ORAL at 07:45

## 2022-01-25 RX ADMIN — CLINDAMYCIN IN 5 PERCENT DEXTROSE 600 MG: 12 INJECTION, SOLUTION INTRAVENOUS at 03:21

## 2022-01-25 RX ADMIN — CEFEPIME HYDROCHLORIDE 2000 MG: 2 INJECTION, POWDER, FOR SOLUTION INTRAVENOUS at 18:39

## 2022-01-25 RX ADMIN — INSULIN LISPRO 4 UNITS: 100 INJECTION, SOLUTION INTRAVENOUS; SUBCUTANEOUS at 12:17

## 2022-01-25 RX ADMIN — FLUTICASONE PROPIONATE 1 SPRAY: 50 SPRAY, METERED NASAL at 08:58

## 2022-01-25 RX ADMIN — CLINDAMYCIN IN 5 PERCENT DEXTROSE 600 MG: 12 INJECTION, SOLUTION INTRAVENOUS at 19:52

## 2022-01-25 RX ADMIN — DICYCLOMINE HYDROCHLORIDE 10 MG: 10 CAPSULE ORAL at 20:28

## 2022-01-25 RX ADMIN — TRAZODONE HYDROCHLORIDE 150 MG: 100 TABLET ORAL at 20:28

## 2022-01-25 RX ADMIN — PREGABALIN 200 MG: 100 CAPSULE ORAL at 08:57

## 2022-01-25 RX ADMIN — DICYCLOMINE HYDROCHLORIDE 10 MG: 10 CAPSULE ORAL at 16:29

## 2022-01-25 RX ADMIN — INSULIN GLARGINE 25 UNITS: 100 INJECTION, SOLUTION SUBCUTANEOUS at 20:32

## 2022-01-25 RX ADMIN — OXYCODONE HYDROCHLORIDE 5 MG: 5 TABLET ORAL at 12:18

## 2022-01-25 RX ADMIN — MIRTAZAPINE 7.5 MG: 15 TABLET, FILM COATED ORAL at 20:28

## 2022-01-25 RX ADMIN — INSULIN LISPRO 1 UNITS: 100 INJECTION, SOLUTION INTRAVENOUS; SUBCUTANEOUS at 20:32

## 2022-01-25 RX ADMIN — OXYCODONE HYDROCHLORIDE 5 MG: 5 TABLET ORAL at 16:28

## 2022-01-25 RX ADMIN — DICYCLOMINE HYDROCHLORIDE 10 MG: 10 CAPSULE ORAL at 08:57

## 2022-01-25 RX ADMIN — PANTOPRAZOLE SODIUM 20 MG: 40 TABLET, DELAYED RELEASE ORAL at 06:51

## 2022-01-25 RX ADMIN — BUDESONIDE AND FORMOTEROL FUMARATE DIHYDRATE 2 PUFF: 80; 4.5 AEROSOL RESPIRATORY (INHALATION) at 19:50

## 2022-01-25 RX ADMIN — PREGABALIN 200 MG: 100 CAPSULE ORAL at 20:28

## 2022-01-25 RX ADMIN — INSULIN GLARGINE 25 UNITS: 100 INJECTION, SOLUTION SUBCUTANEOUS at 07:45

## 2022-01-25 RX ADMIN — OXYCODONE HYDROCHLORIDE 5 MG: 5 TABLET ORAL at 20:28

## 2022-01-25 RX ADMIN — PANTOPRAZOLE SODIUM 20 MG: 40 TABLET, DELAYED RELEASE ORAL at 16:29

## 2022-01-25 RX ADMIN — OXYCODONE HYDROCHLORIDE 5 MG: 5 TABLET ORAL at 03:21

## 2022-01-25 RX ADMIN — VENLAFAXINE HYDROCHLORIDE 150 MG: 150 CAPSULE, EXTENDED RELEASE ORAL at 08:57

## 2022-01-25 RX ADMIN — ACETAMINOPHEN 650 MG: 325 TABLET ORAL at 22:51

## 2022-01-25 RX ADMIN — CLINDAMYCIN IN 5 PERCENT DEXTROSE 600 MG: 12 INJECTION, SOLUTION INTRAVENOUS at 11:53

## 2022-01-25 RX ADMIN — DICYCLOMINE HYDROCHLORIDE 10 MG: 10 CAPSULE ORAL at 12:20

## 2022-01-25 RX ADMIN — SODIUM CHLORIDE: 9 INJECTION, SOLUTION INTRAVENOUS at 11:53

## 2022-01-25 ASSESSMENT — PAIN SCALES - GENERAL
PAINLEVEL_OUTOF10: 10
PAINLEVEL_OUTOF10: 10
PAINLEVEL_OUTOF10: 6
PAINLEVEL_OUTOF10: 8
PAINLEVEL_OUTOF10: 10
PAINLEVEL_OUTOF10: 6
PAINLEVEL_OUTOF10: 8
PAINLEVEL_OUTOF10: 10

## 2022-01-25 ASSESSMENT — PAIN DESCRIPTION - DESCRIPTORS: DESCRIPTORS: BURNING

## 2022-01-25 ASSESSMENT — ENCOUNTER SYMPTOMS
DIARRHEA: 0
CHEST TIGHTNESS: 0
CONSTIPATION: 0
COUGH: 0
BACK PAIN: 0
SHORTNESS OF BREATH: 0
COLOR CHANGE: 0
NAUSEA: 0
ABDOMINAL PAIN: 1
VOMITING: 0

## 2022-01-25 ASSESSMENT — PAIN DESCRIPTION - ONSET: ONSET: ON-GOING

## 2022-01-25 ASSESSMENT — PAIN DESCRIPTION - PAIN TYPE: TYPE: CHRONIC PAIN

## 2022-01-25 ASSESSMENT — PAIN DESCRIPTION - LOCATION: LOCATION: FOOT

## 2022-01-25 ASSESSMENT — PAIN DESCRIPTION - FREQUENCY: FREQUENCY: CONTINUOUS

## 2022-01-25 ASSESSMENT — PAIN DESCRIPTION - ORIENTATION: ORIENTATION: RIGHT;LEFT

## 2022-01-25 NOTE — PROGRESS NOTES
Progress Note  Podiatric Medicine and Surgery     Subjective     CC: right foot pain    Patient seen and examined at bedside. No acute events overnight. Febrile overnight. WBC continue to be elevated. Pain and swelling of right foot continue to be improved. HPI :  Ion Perez is a 49 y.o. female seen at [facility] for right foot pain. Patient stepped on a piece of glass a few days ago. She went to her PCP when PCP removed the glass. She presented to the ED last night due to increased pain and swelling to the right lower extremity. Upon arrival, X-ray was taken which showed no foreign body, acute osseous deformity or soft tissue emphysema. During my evaluation, patient also mentioned pain on her plantar left foot and  pain is not as bad as her right foot. Per chart review, patient visited [facility] podiatry clinic a year ago for diabetic foot care but never follows up after that. 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 Her last A1C in 12/13/2021 is 13.3%. She admits neuropathic pain to bilateral lower extremity. She denies other pedal complaints. ROS: Denies N/V/F/C/SOB/CP. Otherwise negative except at stated in the HPI.      Medications:  Scheduled Meds:   insulin glargine  25 Units SubCUTAneous BID    enoxaparin  30 mg SubCUTAneous BID    lidocaine PF  5 mL IntraDERmal Once    clindamycin (CLEOCIN) IV  600 mg IntraVENous Q8H    budesonide-formoterol  2 puff Inhalation BID    dicyclomine  10 mg Oral 4x Daily    fluticasone  1 spray Each Nostril Daily    mirtazapine  7.5 mg Oral Nightly    pantoprazole  20 mg Oral BID AC    pregabalin  200 mg Oral BID    traZODone  150 mg Oral Nightly    venlafaxine  150 mg Oral Daily with breakfast    sodium chloride flush  5-40 mL IntraVENous 2 times per day    insulin lispro  0-12 Units SubCUTAneous TID WC    insulin lispro  0-6 Units SubCUTAneous Nightly       Continuous Infusions:   sodium chloride      sodium chloride 100 mL/hr at 22 2145    dextrose         PRN Meds:albuterol sulfate HFA, oxyCODONE, sodium chloride flush, sodium chloride, ondansetron **OR** ondansetron, polyethylene glycol, acetaminophen **OR** acetaminophen, potassium chloride **OR** potassium alternative oral replacement **OR** potassium chloride, glucose, dextrose, glucagon (rDNA), dextrose    Objective     Vitals:  Patient Vitals for the past 8 hrs:   BP Temp Temp src Pulse Resp SpO2   22 0752 -- -- -- 97 -- --   22 0721 (!) 147/89 98.3 °F (36.8 °C) Oral 97 16 95 %   22 0321 -- 98.3 °F (36.8 °C) -- -- -- --     Average, Min, and Max for last 24 hours Vitals:  TEMPERATURE:  Temp  Av.9 °F (37.2 °C)  Min: 97.7 °F (36.5 °C)  Max: 100.4 °F (38 °C)    RESPIRATIONS RANGE: Resp  Av.8  Min: 16  Max: 18    PULSE RANGE: Pulse  Av  Min: 64  Max: 108    BLOOD PRESSURE RANGE:  Systolic (97HQQ), XQW:476 , Min:115 , JAB:406   ; Diastolic (38MNU), BVS:64, Min:54, Max:89      PULSE OXIMETRY RANGE: SpO2  Av %  Min: 92 %  Max: 96 %    I/O last 3 completed shifts:  In: -   Out: 1110 [Urine:1110]    CBC:  Recent Labs     22  1744 22  1744 22  0939 22  0451 22  0521   WBC 14.9*   < > 15.0* 10.9 12.5*   HGB 9.1*   < > 8.9* 8.1* 8.0*   HCT 28.9*   < > 28.5* 26.0* 26.3*      < > 375 343 343   .3*  --  250.9*  --   --     < > = values in this interval not displayed. BMP:  Recent Labs     22  0939 22  0451 22  0521    135 136   K 4.4 4.4 4.0    105 106   CO2 21 20 21   BUN 14 19 17   CREATININE 0.63 0.89 0.77   GLUCOSE 96 200* 148*   CALCIUM 8.7 8.1* 8.6        Coags:  Recent Labs     22  17422  0757   PROT 7.2  --    INR  --  1.0       Lab Results   Component Value Date    SEDRATE 124 (H) 2022     Recent Labs     22  0939   .3* 250.9*       Lower Extremity Physical Exam:  Vascular: DP and PT pulses are palpable.  CFT <3 seconds to all digits. Hair growth is absent to the level of the digits.        Neuro: Saph/sural/SP/DP/plantar sensation diminished to light touch.     Musculoskeletal: Muscle strength is 5/5 to all lower extremity muscle groups. Gross deformity is status post left 4th and 5th digit amputation. Tenderness to palpation to previous entry point of injury on the right foot and hyperkeratotic tissue on the left foot.      Dermatologic:     Diffuse edema noted throughout the right lower extremity with moderate erythema and increased warmth. Right foot: full thickness ulcer #1 located right medial arch and measures approximately 2cm x 1cm x 0.4cm. Base is fibrotic. Sloughing skin noted to drained abscess noted above. Purulent drainage noted with no associated mal odor. Does not probe to bone but probe deep to subcutaneous layer. Tracking noted laterally  No fluctuance, crepitus, or induration. Interdigital maceration absent. Left foot: Full thickness ulcer #2 located lateral plantar left foot and measures approximately 1cmx 1cmx0.2cm. Base is fibrotic. Small amount of purulent drainage noted with no associated mal odor. Does not probe to bone, sinus track, or undermine. No fluctuance, crepitus, or induration. Interdigital maceration absent. Clinical Images:                      Imaging:   MRI FOOT RIGHT WO CONTRAST   Final Result   Subcutaneous edema and swelling, most notably affecting the dorsum of the   foot, which may reflect underlying cellulitis. No discrete organized fluid   collection is seen within the limitations of a noncontrast study. No imaging features of acute osteomyelitis appreciated. MRI FOOT LEFT WO CONTRAST   Final Result   Limited evaluation due to patient motion artifact on several sequences.       Skin thickening and subcutaneous edema seen within the plantar soft tissues   of the foot at the level of the proximal metatarsals, which may reflect   underlying cellulitis. Within the limitations of a noncontrast study, no   discrete organized fluid collection is seen. 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 VL DUP LOWER EXTREMITY VENOUS BILATERAL   Final Result      XR FOOT RIGHT (MIN 3 VIEWS)   Final Result   Right foot: No radiopaque foreign body. No fracture. Moderate soft tissue   swelling within the anterior aspect of plantar aspect of the foot   predominantly underlying meta tarsal heads. Left foot: Changes related to prior partial amputation of 4th and 5th ray as   described. No acute fracture. No radiopaque foreign body. Moderate soft   tissue swelling and plantar aspect of the foot. No discrete soft tissue ulcer   is noted. XR FOOT LEFT (MIN 3 VIEWS)   Final Result   Right foot: No radiopaque foreign body. No fracture. Moderate soft tissue   swelling within the anterior aspect of plantar aspect of the foot   predominantly underlying meta tarsal heads. Left foot: Changes related to prior partial amputation of 4th and 5th ray as   described. No acute fracture. No radiopaque foreign body. Moderate soft   tissue swelling and plantar aspect of the foot. No discrete soft tissue ulcer   is noted. Cultures: Group A strep. anaerobe contaminated. Atiya Mitchell is a 49 y.o. female with   1. Diabetic foot ulcer to subcutaneous layer, bilateral feet  2. Cellulitis, bilateral feet  3. Superficial abscess, bilateral feet  4. Poorly uncontrolled DM2 with peripheral neuropathy. 5. S/p bedside I&D right foot    Active Problems:    Type 2 diabetes mellitus without complication, with long-term current use of insulin (HCC)    Right foot infection    Cellulitis and abscess of toe of right foot  Resolved Problems:    * No resolved hospital problems.  *       Plan     · Patient examined and evaluated at bedside   · Treatment options discussed in detail with the patient. · Antibiotic: Clindamycin per primary. Recommend ID consult  · Medical management per primary. · MRI bilateral feet: no definite deep abscess or osteomyelitis. · DVT scan: chronic DVT to RLE. · Dressing applied to right foot: iodoform packing, dsd, ace  · No surgical intervention from podiatry standpoint. Continue local wound care and IV antibiotic. Discoloration likely to be fibrotic tissue. Patient is clinically stable to be discharged from podiatry standpoint.    · Dressing remains intact to left foot: betadine, dsd, ace  · WBAT to Bilateral lower extremity  · Discussed with Dr. Toby Leos, [place]   Podiatric Medicine & Surgery   1/25/2022 at 8:49 AM

## 2022-01-25 NOTE — CARE COORDINATION
Shantell Araujo Quality Flow/Interdisciplinary Rounds Progress Note    Quality Flow Rounds held on January 25, 2022 at [address] Attending:  Bedside Nurse, ,  and Nursing Unit Leadership    Barriers to Discharge: placement     Anticipated Discharge Date:       Anticipated Discharge Disposition: SNF    Readmission Risk              Risk of Unplanned Readmission:  44           Discussed patient goal for the day, patient clinical progression, and barriers to discharge. The following Goal(s) of the Day/Commitment(s) have been identified:  IV Antibiotics - Obtain Order to Convert to Oral, IV Antibiotics - Obtain Infectious Disease Discharge Orders and SNF Discharge - Check H&P Update, Medication Reconciliation, and Transfer Form    Spoke with patient along with Dr. Ritu Sloan. Patient agreeable to SNF; requesting referrals be sent \"anywhere. \" referrals sent to facilities near patient's home. [address] Received call from [facility] and [facility] admissions unable to access [facility]; Referrals hand-faxed.        Geovanny Godinez RN  January 25, 2022

## 2022-01-25 NOTE — CARE COORDINATION
Initial Contact Social Work Note - Ambulatory  1/25/2022        Identified Needs:   Patient's transportation arrangements          Social Work Plan:   Patient had left a message on 01/24/22, [facility] cancel her transportation for 01/26/22 and 01/27/22    HC attempted to contact patient to inform that the [facility] had informed the [facility] and the cancellations had been made      Next Steps:  [facility]. left a message for patient and if no response from   patient, [facility] will attempt to reach at another time. Goals Addressed                 This Visit's Progress       Care Coordination     Community Resource Goal   Improving     Patient states that she needs medical transportation and also has food insecurities. Barriers: fear of failure, lack of motivation, financial, lack of support, overwhelmed by complexity of regimen, stress, time constraints, medication side effects, and lack of education    Plan for overcoming my barriers: [facility]. will assist patient with her medical transportation as needed  [facility] will refer patient to [address] [place] [address] for assistance with getting addition foods in her home.     Confidence: 9/10    Anticipated Goal Completion Date: 08/30/21

## 2022-01-25 NOTE — PROGRESS NOTES
Physical Therapy  Facility/Department: [address] ORTHO/MED SURG  Daily Treatment Note  NAME: Jay Gould  : 1965  MRN: [id number]    Date of Service: 2022    Discharge Recommendations:  Patient would benefit from continued therapy after discharge   PT Equipment Recommendations  Equipment Needed: No    Assessment   Assessment: Pt performed therapeutic exercise in supine. Pt is refusing to attempt to sit EOB or perform OOB activities this date due to c/o increased pain with mobility. Pt was educated on the importance of early mobility and the role of PT in the POC. Pt would benefit from continued PT following discharge to address functional deficits and regain prior level of independence. Prognosis: Good  Decision Making: Medium Complexity  PT Education: Goals;PT Role;Plan of Care  REQUIRES PT FOLLOW UP: Yes  Activity Tolerance  Activity Tolerance: Patient limited by fatigue;Patient limited by endurance; Patient limited by pain     Patient Diagnosis(es): The primary encounter diagnosis was Right foot infection. A diagnosis of Elevated d-dimer was also pertinent to this visit.      has a past medical history of Acid reflux, Acute cystitis with hematuria, Acute non-recurrent maxillary sinusitis, Asthma, Bipolar 1 disorder ([address] Bipolar disorder, mixed ([address] BMI 34.0-34.9,adult, Cannabis use disorder, severe, dependence ([address] Cerebrovascular accident (CVA) ([address] Chest pain, Chronic renal insufficiency, Cocaine abuse ([address] COVID-19 virus RNA test result unknown, DDD (degenerative disc disease), cervical, Diabetes mellitus ([address] Dizziness, Fibromyalgia, History of stroke, Homicidal ideation, Hyperglycemia, Hypertension, Hypotension, IDDM (insulin dependent diabetes mellitus), Lupus ([address] Migraine, Neuropathy, Neuropathy, Polysubstance abuse ([address] Post traumatic stress disorder (PTSD), Posttraumatic stress disorder, Recurrent depression ([address] Recurrent major depressive disorder, in partial remission [facility]), Screening mammogram, encounter for, Severe recurrent major depression with psychotic features ([facility] Severe recurrent major depression without psychotic features ([facility] Stroke (cerebrum) ([facility] Stroke ([facility] Suicidal ideation, Suicidal intent, Vitamin D deficiency, and White matter changes. has a past surgical history that includes Abscess Drainage; chest tube insertion; Abdomen surgery; Cataract removal with implant (Bilateral); LASIK (Bilateral); Finger amputation (Left, 03/13/2021); Hand surgery (Right, 09/14/2021); Hand surgery (Right, 09/15/2021); Finger amputation (Right, 10/11/2021); and Finger amputation (Right, 10/11/2021). Restrictions  Restrictions/Precautions  Restrictions/Precautions: Weight Bearing,Up as Tolerated  Lower Extremity Weight Bearing Restrictions  Right Lower Extremity Weight Bearing: Weight Bearing As Tolerated  Left Lower Extremity Weight Bearing: Weight Bearing As Tolerated  Subjective   General  Chart Reviewed: Yes  Response To Previous Treatment: Patient with no complaints from previous session. Family / Caregiver Present: No  Subjective  Subjective: RN and pt agreeable to PT.   Pain Screening  Patient Currently in Pain: Yes  Pain Assessment  Pain Assessment: 0-10  Pain Level: 10  Vital Signs  Patient Currently in Pain: Yes       Orientation  Orientation  Overall Orientation Status: Within Normal Limits  Cognition   Cognition  Overall Cognitive Status: Exceptions  Safety Judgement: Decreased awareness of need for assistance;Decreased awareness of need for safety  Objective   Bed mobility  Supine to Sit: Unable to assess  Sit to Supine: Unable to assess  Scooting: Unable to assess  Transfers  Sit to Stand: Unable to assess  Stand to sit: Unable to assess  Ambulation  Ambulation?: No  More Ambulation?: No     Balance  Posture: Good  Sitting - Static: Good  Sitting - Dynamic: Fair  Standing - Static: Fair  Standing - Dynamic: Fair  Exercises  Straight Leg Raise: x15 BLE  Heelslides: x15 BLE  Hip Flexion: x15 BLE  Hip Abduction: x15 BLE  Knee Short Arc Quad: x15 BLE  Ankle Pumps: x20 BLE  Other exercises  Other exercises?: No                                                   AM-PAC Score  AM-PAC Inpatient Mobility Raw Score : 18 (01/25/22 1401)  AM-PAC Inpatient T-Scale Score : 43.63 (01/25/22 1401)  Mobility Inpatient CMS 0-100% Score: 46.58 (01/25/22 1401)  Mobility Inpatient CMS G-Code Modifier : CK (01/25/22 1401)          Goals  Short term goals  Time Frame for Short term goals: 10 visits  Short term goal 1: transfers with SBA  Short term goal 2: amb 75 ft with a RW x SBA with WBAT B LE's  Short term goal 3: to be independent with bed mobility  Short term goal 4: 20 min exercise program x SBA  Patient Goals   Patient goals : Return home    Plan    Plan  Times per week: 5-6x wk  Times per day: Daily  Current Treatment Recommendations: Strengthening,Functional Mobility Training,Gait Training,Safety Education & Training,Endurance Training,Pain Management  Safety Devices  Type of devices: Nurse notified,Left in bed,Call light within reach  Restraints  Initially in place: No     Therapy Time   Individual Concurrent Group Co-treatment   Time In 1324         Time Out 1347         Minutes 23         Timed Code Treatment Minutes: Sarai 17, PTA

## 2022-01-25 NOTE — DISCHARGE INSTR - COC
Continuity of Care Form    Patient Name: Hannah Zaldivar   :  1965  MRN:  [id number]    Admit date:  2022  Discharge date:  22    Code Status Order: Full Code   Advance Directives:      Admitting Physician:  Denisha Boateng MD  PCP: Annalise Amaral MD    Discharging Nurse: [id number] Unit/Room#: 9281/9669-40  Discharging Unit Phone Number: [phone]    Emergency Contact:   No emergency contact information on file.     Past Surgical History:  Past Surgical History:   Procedure Laterality Date    ABDOMEN SURGERY      drain tube    ABSCESS DRAINAGE      right buttock    CATARACT REMOVAL WITH IMPLANT Bilateral     CHEST TUBE INSERTION      FINGER AMPUTATION Left 2021    LEFT RING FINER AMPUTATION performed by Thania Jordan MD at [address] Right 10/11/2021    AMPUTATION RIGHT INDEX FINGER    FINGER AMPUTATION Right 10/11/2021    AMPUTATION RIGHT INDEX FINGER performed by Thania Jordan MD at [address] [place] [address] Right 2021    I&D INDEX FINGER performed by Thania Jordan MD at [address] [place] [address] Right 09/15/2021    I&D INDEX FINGER performed by Thania Jordan MD at [address] [place] [address] Bilateral        Immunization History:   Immunization History   Administered Date(s) Administered    COVID-19, Pfizer Purple top, DILUTE for use, 12+ yrs, 30mcg/0.3mL dose 10/05/2021, 2021    Tdap (Boostrix, Adacel) 2017, 2020, 2021, 2022       Active Problems:  Patient Active Problem List   Diagnosis Code    IDDM (insulin dependent diabetes mellitus) [id number]    Lupus ([facility] M32.9    Post traumatic stress disorder (PTSD) F43.10    Acute cystitis with hematuria N30.01    Hyperglycemia R73.9    Suicidal ideation R45.851    Arthritis M19.90    Chronic back pain M54.9, G89.29    Acid reflux K21.9    History of stroke Z86.73    Polysubstance abuse ([facility] F19.10    Cocaine abuse ([facility] F14.10    Chest pain R07.9    Acute non-recurrent maxillary sinusitis J01.00    Acute respiratory failure with hypercapnia ([facility]) J96.02    Alcohol use disorder, severe, dependence ([facility]) F10.20    Asthma exacerbation attacks J45.901    Bilateral edema of lower extremity R60.0    Bipolar 1 disorder, depressed, severe ([facility]) F31.4    BMI 34.0-34.9,adult Z68.34    Cannabis use disorder, severe, dependence ([facility]) F12.20    Cocaine use disorder, severe, dependence ([facility]) F14.20    Dizziness R42    Dyslipidemia E78.5    Family history of colon cancer Z80.0    History of lupus [id number]    Homicidal ideation R45.850    Hypotension I95.9    Neuropathy G62.9    Normocytic anemia D64.9    Recurrent depression ([facility]) F33.9    Recurrent major depressive disorder, in partial remission ([facility]) F33.41    Severe recurrent major depression with psychotic features ([facility]) F33.3    Severe recurrent major depression without psychotic features ([facility] F33.2    Upper GI bleed K92.2    Vitamin D deficiency E55.9    White matter changes [id number]    Gastroesophageal reflux disease K21.9    Stroke (cerebrum) ([facility]) I63.9    Rib lesion M89.9    Diabetes mellitus type 2 in obese ([facility]) E11.69, E66.9    YAEL (generalized anxiety disorder) F41.1    Posttraumatic stress disorder F43.10    Suicidal intent R45.851    Leg weakness, bilateral R29.898    Poorly controlled diabetes mellitus ([facility]) E11.65    Felon of finger L03.019    Osteomyelitis ([facility]) M86.9    Recurrent falls R29.6    Seasonal allergic rhinitis J30.2    Fibromyalgia M79.7    Tenosynovitis of finger M65.9    Open wound of right index finger S61.200A    Adverse effect of COVID-19 vaccine T50. B95A    Wound dehiscence T81.30XA    Amputation finger S68.119A    Abscess of right index finger L02.511    Type 2 diabetes mellitus without complication, with long-term current use of insulin ([facility]) E11.9, Z79.4    Major depression F32.9    Right foot infection L08.9    Cellulitis and abscess of toe of right foot L03.031, L02.611 Isolation/Infection:   Isolation            Contact          Patient Infection Status       Infection Onset Added Last Indicated Last Indicated By Review Planned Expiration Resolved Resolved By    MRSA 02/24/21 02/27/21 02/24/21 Culture, Anaerobic and Aerobic        Finger 2/2021    Resolved    COVID-19 (Rule Out) 03/13/21 03/13/21 03/13/21 COVID-19, Rapid (Ordered)   03/13/21 Rule-Out Test Resulted            Nurse Assessment:  Last Vital Signs: /63   Pulse 93   Temp 98.4 °F (36.9 °C) (Oral)   Resp 16   Ht 5' 6\" (1.676 m)   Wt 239 lb (108.4 kg)   LMP  (LMP Unknown)   SpO2 99%   BMI 38.58 kg/m²     Last documented pain score (0-10 scale): Pain Level: 10  Last Weight:   Wt Readings from Last 1 Encounters:   01/23/22 239 lb (108.4 kg)     Mental Status:  oriented and alert    IV Access:  - PICC - site  L Upper Arm, insertion date: 2/3/22    Nursing Mobility/ADLs:  Walking   Assisted  Transfer  Assisted  Bathing  Assisted  Dressing  Assisted  Toileting  Independent  Feeding  Independent  Med Admin  Assisted  Med Delivery   whole    Wound Care Documentation and Therapy:        Elimination:  Continence:    Bowel: Yes  Bladder: Yes  Urinary Catheter: None   Colostomy/Ileostomy/Ileal Conduit: No       Date of Last BM: 2/7/22    Intake/Output Summary (Last 24 hours) at 1/25/2022 1413  Last data filed at 1/25/2022 0804  Gross per 24 hour   Intake --   Output 1150 ml   Net -1150 ml     I/O last 3 completed shifts:  In: -   Out: 1110 [Urine:1110]    Safety Concerns:     History of Falls (last 30 days) and At Risk for Falls    Impairments/Disabilities:      None    Nutrition Therapy:  Current Nutrition Therapy:   - Oral Diet:  Carb Control 4 carbs/meal (1800kcals/day)    Routes of Feeding: Oral  Liquids: No Restrictions  Daily Fluid Restriction: no  Last Modified Barium Swallow with Video (Video Swallowing Test): not done    Treatments at the Time of Hospital Discharge:   Respiratory Treatments: none  Oxygen Therapy:  is not on home oxygen therapy. Ventilator:    - No ventilator support    Rehab Therapies: Physical Therapy and Occupational Therapy  Weight Bearing Status/Restrictions: Weight bearing to heel of right foot. WBAT to left foot   Other Treatments:  Please apply dressing change daily includin/4 inch iodoform packing, silvercel, 4x4 gauze, kerlix and ACE wrap to the right foot. Please apply dressing change daily including betadine and a small mepelix border bandage to the left foot. Please remain weight bearing in surgical shoe. Weight bearing to heel of right foot. WBAT to left foot  Please follow up with Dr. Tiffanie Solis one week after discharge. Patient's personal belongings (please select all that are sent with patient):  Pt left with all belongings    RN SIGNATURE:  Electronically signed by Ghulam Thompson RN on 22 at 4:51 PM EST    CASE MANAGEMENT/SOCIAL WORK SECTION    Inpatient Status Date: ***    Readmission Risk Assessment Score:  Readmission Risk              Risk of Unplanned Readmission:  44           Discharging to Facility/ Agency   Name:       [address] [place] [address], [address] [place] [id number]       Phone: [phone]       Fax: [phone]          Dialysis Facility (if applicable)   Name:  Address:  Dialysis Schedule:  Phone:  Fax:    / signature: Electronically signed by Kristine Aleman RN on 22 at 4:26 PM EST    PHYSICIAN SECTION    Prognosis: Good    Condition at Discharge: Stable    Rehab Potential (if transferring to Rehab): Good    Recommended Labs or Other Treatments After Discharge:     Please apply dressing change daily includin/4 inch iodoform packing, silvercel, 4x4 gauze, kerlix and ACE wrap to the right foot. Please apply dressing change daily including betadine and a small mepelix border bandage to the left foot. Please remain weight bearing in surgical shoe.   Please follow up with Dr. Tiffanie Solis one week after

## 2022-01-25 NOTE — CONSULTS
the past 8 hrs:   BP Temp Temp src Pulse Resp SpO2   01/25/22 1213 127/63 98.4 °F (36.9 °C) Oral 93 16 99 %       Summary of relevant labs:  Labs:  [id number]  [id number]    Micro:  cx foot pus GAS  Imaging:      I have personally reviewed the past medical history, past surgical history, medications, social history, and family history, and I haveupdated the database accordingly. Allergies:   Bactrim [sulfamethoxazole-trimethoprim] and Adhesive tape     Review of Systems:     Review of Systems   Constitutional: Negative for activity change. HENT: Negative for congestion. Eyes: Negative for discharge. Respiratory: Negative for apnea. Cardiovascular: Negative for chest pain. Gastrointestinal: Negative for abdominal distention. Endocrine: Negative for cold intolerance. Genitourinary: Negative for dysuria. Musculoskeletal: Negative for arthralgias. Skin: Positive for color change and wound. Allergic/Immunologic: Negative for immunocompromised state. Neurological: Negative for dizziness. Hematological: Negative for adenopathy. Psychiatric/Behavioral: Negative for agitation. Physical Examination :       Physical Exam  Constitutional:       Appearance: Normal appearance. She is not ill-appearing. HENT:      Head: Normocephalic and atraumatic. Nose: Nose normal. No congestion. Eyes:      General: No scleral icterus. Pupils: Pupils are equal, round, and reactive to light. Cardiovascular:      Rate and Rhythm: Normal rate and regular rhythm. Heart sounds: Normal heart sounds. No murmur heard. Pulmonary:      Effort: No respiratory distress. Breath sounds: Normal breath sounds. Abdominal:      General: There is no distension. Palpations: Abdomen is soft. Genitourinary:     Comments: Urine fatuma  Musculoskeletal:         General: Swelling present. No tenderness. Cervical back: Neck supple. No rigidity. Skin:     General: Skin is dry. Coloration: Skin is not jaundiced. Findings: Erythema present. Neurological:      Mental Status: She is alert and oriented to person, place, and time. Mental status is at baseline. Psychiatric:         Mood and Affect: Mood normal.         Thought Content:  Thought content normal.         Past Medical History:     Past Medical History:   Diagnosis Date    Acid reflux 5/29/2017    Acute cystitis with hematuria 10/11/2016    Acute non-recurrent maxillary sinusitis 11/28/2017    Asthma     Bipolar 1 disorder ([address] 1/4/2018    Bipolar disorder, mixed ([address]     BMI 34.0-34.9,adult 11/28/2017    Cannabis use disorder, severe, dependence ([address] 12/19/2017    Cerebrovascular accident (CVA) ([address] 6/14/2017    Chest pain 11/5/2016    Chronic renal insufficiency     Cocaine abuse ([address] 1/5/2018    COVID-19 virus RNA test result unknown     DDD (degenerative disc disease), cervical     Diabetes mellitus ([address]     Dizziness 6/13/2017    Fibromyalgia     History of stroke 9/9/2017    Homicidal ideation 11/6/2017    Hyperglycemia     Hypertension     Hypotension 1/18/2019    IDDM (insulin dependent diabetes mellitus) 12/21/2015    Lupus ([address]     Migraine     Neuropathy     Neuropathy     Polysubstance abuse ([address] 9/17/2017    Post traumatic stress disorder (PTSD)     Posttraumatic stress disorder 5/29/2017    Recurrent depression ([address] 5/29/2017    Recurrent major depressive disorder, in partial remission ([address] 11/28/2017    Screening mammogram, encounter for 11/28/2017    Severe recurrent major depression with psychotic features ([address] 12/19/2017    Severe recurrent major depression without psychotic features ([address] 12/19/2017    Stroke (cerebrum) ([address] 6/14/2017    Stroke ([address]     per chart notes    Suicidal ideation     Suicidal intent 3/10/2017    Vitamin D deficiency 11/28/2017    White matter changes 6/13/2017       Past Surgical  History:     Past Surgical History:   Procedure Laterality Date    ABDOMEN SURGERY      drain tube    ABSCESS DRAINAGE      right buttock    CATARACT REMOVAL WITH IMPLANT Bilateral     CHEST TUBE INSERTION      FINGER AMPUTATION Left 03/13/2021    LEFT RING FINER AMPUTATION performed [facility] Right 10/11/2021    AMPUTATION RIGHT INDEX FINGER    FINGER AMPUTATION Right 10/11/2021    AMPUTATION RIGHT INDEX FINGER performed by Olivia Walker MD at [address] [place]  Right 09/14/2021    I&D INDEX FINGER performed by Olivia Walker MD at [address] [place]  Right 09/15/2021    I&D INDEX FINGER performed by Olivia Walker MD at [address] [place] [address] Bilateral        Medications:      insulin glargine  25 Units SubCUTAneous BID    enoxaparin  30 mg SubCUTAneous BID    lidocaine PF  5 mL IntraDERmal Once    clindamycin (CLEOCIN) IV  600 mg IntraVENous Q8H    budesonide-formoterol  2 puff Inhalation BID    dicyclomine  10 mg Oral 4x Daily    fluticasone  1 spray Each Nostril Daily    mirtazapine  7.5 mg Oral Nightly    pantoprazole  20 mg Oral BID AC    pregabalin  200 mg Oral BID    traZODone  150 mg Oral Nightly    venlafaxine  150 mg Oral Daily with breakfast    sodium chloride flush  5-40 mL IntraVENous 2 times per day    insulin lispro  0-12 Units SubCUTAneous TID WC    insulin lispro  0-6 Units SubCUTAneous Nightly       Social History:     Social History     Socioeconomic History    Marital status: Legally      Spouse name: Not on file    Number of children: Not on file    Years of education: Not on file    Highest education level: Not on file   Occupational History    Not on file   Tobacco Use    Smoking status: Never Smoker    Smokeless tobacco: Never Used   Vaping Use    Vaping Use: Never used   Substance and Sexual Activity    Alcohol use: Not Currently    Drug use: Yes     Types: Marijuana ([place]), Cocaine     Comment: + Cocaine 2/2021 also, see Care Everywhere UDS    Sexual activity: Not Currently     Partners: Male   Other Topics Concern    Not on file   Social History Narrative    Not on file     Social Determinants of Health     Financial Resource Strain: High Risk    Difficulty of Paying Living Expenses: Hard   Food Insecurity: Food Insecurity Present    Worried About Running Out of Food in the Last Year: Often true    Kelin of Food in the Last Year: Often true   Transportation Needs: Unmet Transportation Needs    Lack of Transportation (Medical):  Yes    Lack of Transportation (Non-Medical): Yes   Physical Activity: Inactive    Days of Exercise per Week: 0 days    Minutes of Exercise per Session: 0 min   Stress: Stress Concern Present    Feeling of Stress : Rather much   Social Connections:     Frequency of Communication with Friends and Family: Not on file    Frequency of Social Gatherings with Friends and Family: Not on file    Attends Christian Services: Not on file    Active Member of Clubs or Organizations: Not on file    Attends Club or Organization Meetings: Not on file    Marital Status: Not on file   Intimate Partner Violence:     Fear of Current or Ex-Partner: Not on file    Emotionally Abused: Not on file    Physically Abused: Not on file    Sexually Abused: Not on file   Housing Stability: High Risk    Unable to Pay for Housing in the Last Year: Yes    Number of [place] in the Last Year: 2    Unstable Housing in the Last Year: Yes       Family History:     Family History   Problem Relation Age of Onset    Diabetes Mother     Stroke Mother     Diabetes Father     Diabetes Sister     Diabetes Brother     Other Son         GSW    No Known Problems Sister       Medical Decision Making:   I have independently reviewed/ordered the following labs:    CBC with Differential:   Recent Labs     01/24/22  0451 01/25/22  0521   WBC 10.9 12.5*   HGB 8.1* 8.0*   HCT 26.0* 26.3*    343   LYMPHOPCT 28 26   MONOPCT 14* 14*     BMP:  Recent Labs     01/24/22  0451 01/25/22  0521    136   K 4.4 4.0    106   CO2 20 21   BUN 19 17   CREATININE 0.89 0.77     Hepatic Function Panel:   Recent Labs     01/22/22  1744   PROT 7.2   LABALBU 4.0   BILITOT <0.10*   ALKPHOS 136*   ALT 14   AST 14     No results for input(s): RPR in the last 72 hours. No results for input(s): HIV in the last 72 hours. No results for input(s): BC in the last 72 hours. Lab Results   Component Value Date    CREATININE 0.77 01/25/2022    GLUCOSE 148 01/25/2022       Detailed results: Thank you for allowing us to participate in the care of this patient. Please call with questions. This note is created with the assistance of a speech recognition program.  While intending to generate adocument that actually reflects the content of the visit, the document can still have some errors including those of syntax and sound a like substitutions which may escape proof reading. It such instances, actual meaningcan be extrapolated by contextual diversion.     Aleuxs Arguelles MD  Office: [phone]  Perfect serve / office [phone]

## 2022-01-25 NOTE — PLAN OF CARE
Dr. Alexandra Deal PS writer concerning about deep abscess of the right foot. Will order CT scan to confirm.

## 2022-01-25 NOTE — CARE COORDINATION
Ambulatory Care Coordination Note  1/25/2022  CM Risk Score: 2  Charlson 10 Year Mortality Risk Score: 79%     ACC: Fayrene Shed    Summary Note: Elissa Solis calls to inform CM that she is hospitalized. She reports she may need another \"surgery\" on her feet. Her wounds were debrided. She does report she has to be out of her apartment at the [facility] by January 31. She does not have help with moving. She will not be able to box her belongings and move d/t the condition of her feet. CM discussed with Elissa Solis that she may need to go to a SNF at discharge. CM encouraged Elissa Solis to be open minded with this possibility. She is agreeable. She again is worried about how she will move. She does have a daughter who is local that may be able to help. The [facility] may need to box her belongings and hold them for Elissa Solis if she goes to a SNF. CM suggested Elissa Solis take things one day at a time. She is agreeable. Plan:  Follow along while admitted and assist/support as needed. Route encounter to [facility] Care Coordination Interventions    Program Enrollment: Complex Care  Referral from Primary Care Provider: No  Suggested Interventions and Community Resources  Diabetes Education: Not Started  Grief Counselor: Not Started  Home Health Services: Completed (Comment: [address])  Medi Set or Pill Pack: Completed  Physical Therapy: Not Started  Registered Dietician: Completed (Comment: diabetic)  Social Work: Completed  Transportation Support: In Process  Zone Management Tools: Completed (Comment: DM)         Goals Addressed    None         Prior to Admission medications    Medication Sig Start Date End Date Taking? Authorizing Provider   pregabalin (LYRICA) 200 MG capsule Take 1 capsule by mouth 2 times daily for 30 days.  1/20/22 2/19/22  Grisel Hess MD   insulin glargine (LANTUS SOLOSTAR) 100 UNIT/ML injection pen Inject 30 Units into the skin 2 times daily 1/20/22   Grisel Hess MD   doxycycline hyclate (VIBRA-TABS) 100 MG tablet Take 1 tablet by mouth 2 times daily for 7 days 1/20/22 1/27/22  Lesley Biggs MD   fluticasone [facility]) 50 MCG/ACT nasal spray 1 spray by Each Nostril route daily 12/22/21   Anabel Rizvi MD   acetaminophen (TYLENOL) 500 MG tablet Take 2 tablets by mouth 3 times daily as needed for Pain 12/22/21   Anabel Rizvi MD   dicyclomine (BENTYL) 10 MG capsule Take 1 capsule by mouth 4 times daily 12/17/21   Anabel Rizvi MD   metFORMIN (GLUCOPHAGE) 1000 MG tablet Take 1 tablet by mouth 2 times daily (with meals) 12/17/21   Anabel Rizvi MD   mirtazapine (REMERON) 7.5 MG tablet Take 1 tablet by mouth nightly 12/17/21   Anabel Rizvi MD   pantoprazole (PROTONIX) 20 MG tablet Take 1 tablet by mouth 2 times daily (before meals) 12/17/21   Anabel Rizvi MD   traZODone (DESYREL) 150 MG tablet Take 1 tablet by mouth nightly 12/17/21   Anabel Rizvi MD   venlafaxine (EFFEXOR XR) 150 MG extended release capsule Take 1 capsule by mouth daily (with breakfast) 12/17/21   Anabel Rizvi MD   ibuprofen (ADVIL;MOTRIN) 800 MG tablet Take 1 tablet by mouth every 8 hours as needed for Pain 11/15/21 11/29/21  Yong Quinn MD   Lancets MISC 1 each by Does not apply route 3 times daily 11/15/21   Yong Quinn MD   Alcohol Swabs 70 % PADS Use 1 swab 3 times daily as needed 11/15/21   Yong Quinn MD   blood glucose monitor strips Test 3 times a day & as needed for symptoms of irregular blood glucose. Dispense sufficient amount for indicated testing frequency plus additional to accommodate PRN testing needs.  11/8/21   Janine Lockhart MD   Lancets MISC 1 each by Does not apply route 3 times daily 11/8/21   Janine Lockhart MD   Insulin Pen Needle ([employer] PEN NEEDLES 31G) 31G X 8 MM MISC 1 each by Does not apply route daily 11/8/21   Kandace Quan MD   FREESTYLE LITE strip 1 each by Does not apply route 3 times daily 11/11/20   Anabel Rizvi MD   albuterol sulfate  (90 Base) MCG/ACT inhaler Inhale 2 puffs into the lungs every 4 hours as needed for Wheezing 10/20/20 9/22/21  Ericka Crawford MD   FLOVENT  MCG/ACT inhaler Inhale 2 puffs into the lungs 2 times daily 10/20/20   Ericka Crawford MD   budesonide-formoterol (SYMBICORT) 80-4.5 MCG/ACT AERO Inhale 2 puffs into the lungs 2 times daily 10/20/20   Ericka Crawford MD       Future Appointments   Date Time Provider Jj Hernandez   1/27/2022  9:15 AM Christelle Bass MD [address] [place] [address]   2/8/2022  9:30 AM Haydee Van, PhD [address] [place] [address]

## 2022-01-25 NOTE — PROGRESS NOTES
[facility]  Progress Note    Date:   1/25/2022  Patient name:  Luis Valles  Date of admission:  1/22/2022  4:43 PM  MRN:   [id number]  YOB: 1965    SUBJECTIVE/Last 24 hours update:     Day 2 Hospitalization:   Patient was seen and examined at the bedside. No acute events overnight. S/P I&D of right foot and left foot debridement. Planned to have another I&D today. Febrile over night, T max 100.4. Feet pain controlled with pain meds. Reports lower abdominal pain that worsened, radiates to right groin and thigh. UA shows UTI. Patient is currenlty on clindamycin. ID consulted. Denies chills, chest pain, SOB, nausea, vomiting. REVIEW OF SYSTEMS:      Review of Systems   Constitutional: Negative for activity change, appetite change, chills, fatigue and fever. HENT: Negative. Respiratory: Negative for cough, chest tightness and shortness of breath. Cardiovascular: Negative for chest pain, palpitations and leg swelling. Gastrointestinal: Positive for abdominal pain. Negative for constipation, diarrhea, nausea and vomiting. Genitourinary: Positive for dysuria. Negative for decreased urine volume, difficulty urinating, frequency and hematuria. Musculoskeletal: Negative for arthralgias, back pain and neck pain. Skin: Negative for color change. Neurological: Negative for dizziness, weakness, light-headedness, numbness and headaches.        PAST MEDICAL HISTORY:      has a past medical history of Acid reflux, Acute cystitis with hematuria, Acute non-recurrent maxillary sinusitis, Asthma, Bipolar 1 disorder ([address] Bipolar disorder, mixed ([address] BMI 34.0-34.9,adult, Cannabis use disorder, severe, dependence ([address] Cerebrovascular accident (CVA) ([address] Chest pain, Chronic renal insufficiency, Cocaine abuse ([address] COVID-19 virus RNA test result unknown, DDD (degenerative disc disease), cervical, Diabetes mellitus ([address] Dizziness, Fibromyalgia, History of stroke, Homicidal ideation, Hyperglycemia, Hypertension, Hypotension, IDDM (insulin dependent diabetes mellitus), Lupus ([facility] Migraine, Neuropathy, Neuropathy, Polysubstance abuse ([address] Post traumatic stress disorder (PTSD), Posttraumatic stress disorder, Recurrent depression ([address] Recurrent major depressive disorder, in partial remission ([address] Screening mammogram, encounter for, Severe recurrent major depression with psychotic features ([address] Severe recurrent major depression without psychotic features ([address] Stroke (cerebrum) ([address] Stroke ([address] Suicidal ideation, Suicidal intent, Vitamin D deficiency, and White matter changes. PAST SURGICAL HISTORY:      has a past surgical history that includes Abscess Drainage; chest tube insertion; Abdomen surgery; Cataract removal with implant (Bilateral); LASIK (Bilateral); Finger amputation (Left, 03/13/2021); Hand surgery (Right, 09/14/2021); Hand surgery (Right, 09/15/2021); Finger amputation (Right, 10/11/2021); and Finger amputation (Right, 10/11/2021). SOCIALHISTORY:      reports that she has never smoked. She has never used smokeless tobacco. She reports previous alcohol use. She reports current drug use. Drugs: Marijuana (Weed) and Cocaine. FAMILY HISTORY:      family history includes Diabetes in her brother, father, mother, and sister; No Known Problems in her sister; Other in her son; Stroke in her mother. HOME MEDICATIONS:      Prior to Admission medications    Medication Sig Start Date End Date Taking? Authorizing Provider   pregabalin (LYRICA) 200 MG capsule Take 1 capsule by mouth 2 times daily for 30 days.  1/20/22 2/19/22  Grisel Hess MD   insulin glargine (LANTUS SOLOSTAR) 100 UNIT/ML injection pen Inject 30 Units into the skin 2 times daily 1/20/22   Grisel Hess MD   doxycycline hyclate (VIBRA-TABS) 100 MG tablet Take 1 tablet by mouth 2 times daily for 7 days 1/20/22 1/27/22  Wei Lamar Joseph Faith MD   fluticasone [facility]) 50 MCG/ACT nasal spray 1 spray by Each Nostril route daily 12/22/21   Bunny Hunter MD   acetaminophen (TYLENOL) 500 MG tablet Take 2 tablets by mouth 3 times daily as needed for Pain 12/22/21   Bunny Hunter MD   dicyclomine (BENTYL) 10 MG capsule Take 1 capsule by mouth 4 times daily 12/17/21   Bunny Hunter MD   metFORMIN (GLUCOPHAGE) 1000 MG tablet Take 1 tablet by mouth 2 times daily (with meals) 12/17/21   Bunny Hunter MD   mirtazapine (REMERON) 7.5 MG tablet Take 1 tablet by mouth nightly 12/17/21   Bunny Hunter MD   pantoprazole (PROTONIX) 20 MG tablet Take 1 tablet by mouth 2 times daily (before meals) 12/17/21   Bunny Hunter MD   traZODone (DESYREL) 150 MG tablet Take 1 tablet by mouth nightly 12/17/21   Bunny Hunter MD   venlafaxine (EFFEXOR XR) 150 MG extended release capsule Take 1 capsule by mouth daily (with breakfast) 12/17/21   Bunny Hunter MD   ibuprofen (ADVIL;MOTRIN) 800 MG tablet Take 1 tablet by mouth every 8 hours as needed for Pain 11/15/21 11/29/21  Sami Hewitt MD   Lancets MISC 1 each by Does not apply route 3 times daily 11/15/21   Sami Hewitt MD   Alcohol Swabs 70 % PADS Use 1 swab 3 times daily as needed 11/15/21   Sami Hewitt MD   blood glucose monitor strips Test 3 times a day & as needed for symptoms of irregular blood glucose. Dispense sufficient amount for indicated testing frequency plus additional to accommodate PRN testing needs.  11/8/21   Brittanie Cardoza MD   Lancets MISC 1 each by Does not apply route 3 times daily 11/8/21   Brittanie Cardoza MD   Insulin Pen Needle ([employer] PEN NEEDLES 31G) 31G X 8 MM MISC 1 each by Does not apply route daily 11/8/21   Kayleigh King MD   FREESTYLE LITE strip 1 each by Does not apply route 3 times daily 11/11/20   Bunny Hunter MD   albuterol sulfate  (90 Base) MCG/ACT inhaler Inhale 2 puffs into the lungs every 4 hours as needed for Wheezing 10/20/20 9/22/21  Wolfgang Schwab, MD   FLOVENT  MCG/ACT inhaler Inhale 2 puffs into the lungs 2 times daily 10/20/20   Wolfgang Schwab, MD   budesonide-formoterol (SYMBICORT) 80-4.5 MCG/ACT AERO Inhale 2 puffs into the lungs 2 times daily 10/20/20   Wolfgang Schwab, MD       ALLERGIES:     Bactrim [sulfamethoxazole-trimethoprim] and Adhesive tape    OBJECTIVE:       Vitals:    01/24/22 1945 01/25/22 0321 01/25/22 0721 01/25/22 0752   BP: (!) 156/85  (!) 147/89    Pulse: 108  97 97   Resp: 18  16    Temp: 100.4 °F (38 °C) 98.3 °F (36.8 °C) 98.3 °F (36.8 °C)    TempSrc: Oral  Oral    SpO2: 96%  95%    Weight:       Height:             Intake/Output Summary (Last 24 hours) at 1/25/2022 0804  Last data filed at 1/25/2022 0345  Gross per 24 hour   Intake --   Output 1110 ml   Net -1110 ml       PHYSICAL EXAM:    Physical Exam  Vitals and nursing note reviewed. Constitutional:       General: She is not in acute distress. Appearance: Normal appearance. She is not ill-appearing. HENT:      Head: Normocephalic and atraumatic. Mouth/Throat:      Pharynx: Oropharynx is clear. Cardiovascular:      Rate and Rhythm: Normal rate and regular rhythm. Pulses: Normal pulses. Heart sounds: No murmur heard. Pulmonary:      Effort: Pulmonary effort is normal.      Breath sounds: Normal breath sounds. Abdominal:      Palpations: Abdomen is soft. There is no mass. Tenderness: There is no abdominal tenderness. Musculoskeletal:         General: No tenderness. Normal range of motion. Cervical back: Normal range of motion and neck supple. Right lower leg: Edema present. Left lower leg: Edema present. Neurological:      General: No focal deficit present. Mental Status: She is alert and oriented to person, place, and time.          ASSESSMENT:      Active Problems:    Type 2 diabetes mellitus without complication, with long-term current use of insulin (HCC)    Right foot infection Cellulitis and abscess of toe of right foot  Resolved Problems:    * No resolved hospital problems. *      PLAN:        RLE foot wound/infection  - Tmax 100.4  - leukocytosis, elevated CRP  - X-ray revealed no foreign body in foot. However moderate right foot swelling.  - S/P I&D of right foot and left foot debridement. - on IV clindamycin  - pain control  -  ml/hr  - podiatry consulted, follow up recommendations  -ID consulted- appreciate recs.   - elevated D-dime  -Venous doppler shows chronic DVT  -Continue DVT prophylaxis, lovenos    DM2  - A1C 10  -POCT glucose  -on lantus 20U BID  - medium dose ISS  - hypoglycemia protocol     Bipolar disorder  - stable  - continue remeron 7.5 mg PO nightly  - effexor  mg po daily  - trazadone 150 mg PO nightly    Plan will be discussed with the attending, Megan Posada MD  Family Medicine Resident  1/25/2022 8:04 AM

## 2022-01-26 ENCOUNTER — ANESTHESIA EVENT (OUTPATIENT)
Dept: OPERATING ROOM | Age: 57
DRG: 364 | End: 2022-01-26
Payer: COMMERCIAL

## 2022-01-26 ENCOUNTER — TELEPHONE (OUTPATIENT)
Dept: FAMILY MEDICINE CLINIC | Age: 57
End: 2022-01-26

## 2022-01-26 LAB
ABSOLUTE EOS #: 0.42 K/UL (ref 0–0.44)
ABSOLUTE IMMATURE GRANULOCYTE: 0.14 K/UL (ref 0–0.3)
ABSOLUTE LYMPH #: 4.73 K/UL (ref 1.1–3.7)
ABSOLUTE MONO #: 1.67 K/UL (ref 0.1–1.2)
ANION GAP SERPL CALCULATED.3IONS-SCNC: 13 MMOL/L (ref 9–17)
BASOPHILS # BLD: 1 % (ref 0–2)
BASOPHILS ABSOLUTE: 0.14 K/UL (ref 0–0.2)
BUN BLDV-MCNC: 13 MG/DL (ref 6–20)
BUN/CREAT BLD: ABNORMAL (ref 9–20)
CALCIUM SERPL-MCNC: 8.8 MG/DL (ref 8.6–10.4)
CHLORIDE BLD-SCNC: 101 MMOL/L (ref 98–107)
CO2: 19 MMOL/L (ref 20–31)
CREAT SERPL-MCNC: 0.94 MG/DL (ref 0.5–0.9)
DIFFERENTIAL TYPE: ABNORMAL
EOSINOPHILS RELATIVE PERCENT: 3 % (ref 1–4)
GFR AFRICAN AMERICAN: >60 ML/MIN
GFR NON-AFRICAN AMERICAN: >60 ML/MIN
GFR SERPL CREATININE-BSD FRML MDRD: ABNORMAL ML/MIN/{1.73_M2}
GFR SERPL CREATININE-BSD FRML MDRD: ABNORMAL ML/MIN/{1.73_M2}
GLUCOSE BLD-MCNC: 234 MG/DL (ref 65–105)
GLUCOSE BLD-MCNC: 244 MG/DL (ref 65–105)
GLUCOSE BLD-MCNC: 47 MG/DL (ref 65–105)
GLUCOSE BLD-MCNC: 64 MG/DL (ref 70–99)
GLUCOSE BLD-MCNC: 71 MG/DL (ref 65–105)
GLUCOSE BLD-MCNC: 97 MG/DL (ref 65–105)
HCT VFR BLD CALC: 30.2 % (ref 36.3–47.1)
HEMOGLOBIN: 9.3 G/DL (ref 11.9–15.1)
IMMATURE GRANULOCYTES: 1 %
LYMPHOCYTES # BLD: 34 % (ref 24–43)
MCH RBC QN AUTO: 30.9 PG (ref 25.2–33.5)
MCHC RBC AUTO-ENTMCNC: 30.8 G/DL (ref 28.4–34.8)
MCV RBC AUTO: 100.3 FL (ref 82.6–102.9)
MONOCYTES # BLD: 12 % (ref 3–12)
MORPHOLOGY: NORMAL
NRBC AUTOMATED: 0.2 PER 100 WBC
PDW BLD-RTO: 13.4 % (ref 11.8–14.4)
PLATELET # BLD: 391 K/UL (ref 138–453)
PLATELET ESTIMATE: ABNORMAL
PMV BLD AUTO: 10.7 FL (ref 8.1–13.5)
POTASSIUM SERPL-SCNC: 3.6 MMOL/L (ref 3.7–5.3)
RBC # BLD: 3.01 M/UL (ref 3.95–5.11)
RBC # BLD: ABNORMAL 10*6/UL
SEG NEUTROPHILS: 49 % (ref 36–65)
SEGMENTED NEUTROPHILS ABSOLUTE COUNT: 6.8 K/UL (ref 1.5–8.1)
SODIUM BLD-SCNC: 133 MMOL/L (ref 135–144)
WBC # BLD: 13.9 K/UL (ref 3.5–11.3)
WBC # BLD: ABNORMAL 10*3/UL

## 2022-01-26 PROCEDURE — 6370000000 HC RX 637 (ALT 250 FOR IP): Performed by: STUDENT IN AN ORGANIZED HEALTH CARE EDUCATION/TRAINING PROGRAM

## 2022-01-26 PROCEDURE — 6360000002 HC RX W HCPCS: Performed by: STUDENT IN AN ORGANIZED HEALTH CARE EDUCATION/TRAINING PROGRAM

## 2022-01-26 PROCEDURE — 85025 COMPLETE CBC W/AUTO DIFF WBC: CPT

## 2022-01-26 PROCEDURE — 2500000003 HC RX 250 WO HCPCS: Performed by: STUDENT IN AN ORGANIZED HEALTH CARE EDUCATION/TRAINING PROGRAM

## 2022-01-26 PROCEDURE — 80048 BASIC METABOLIC PNL TOTAL CA: CPT

## 2022-01-26 PROCEDURE — 99233 SBSQ HOSP IP/OBS HIGH 50: CPT | Performed by: FAMILY MEDICINE

## 2022-01-26 PROCEDURE — 2580000003 HC RX 258: Performed by: STUDENT IN AN ORGANIZED HEALTH CARE EDUCATION/TRAINING PROGRAM

## 2022-01-26 PROCEDURE — 6370000000 HC RX 637 (ALT 250 FOR IP): Performed by: INTERNAL MEDICINE

## 2022-01-26 PROCEDURE — 36415 COLL VENOUS BLD VENIPUNCTURE: CPT

## 2022-01-26 PROCEDURE — 76937 US GUIDE VASCULAR ACCESS: CPT

## 2022-01-26 PROCEDURE — 1200000000 HC SEMI PRIVATE

## 2022-01-26 PROCEDURE — 97110 THERAPEUTIC EXERCISES: CPT

## 2022-01-26 PROCEDURE — 99232 SBSQ HOSP IP/OBS MODERATE 35: CPT | Performed by: INTERNAL MEDICINE

## 2022-01-26 PROCEDURE — 82947 ASSAY GLUCOSE BLOOD QUANT: CPT

## 2022-01-26 RX ORDER — CEPHALEXIN 500 MG/1
500 CAPSULE ORAL EVERY 6 HOURS SCHEDULED
Status: DISCONTINUED | OUTPATIENT
Start: 2022-01-26 | End: 2022-01-29

## 2022-01-26 RX ORDER — FENTANYL CITRATE 50 UG/ML
50 INJECTION, SOLUTION INTRAMUSCULAR; INTRAVENOUS
Status: COMPLETED | OUTPATIENT
Start: 2022-01-26 | End: 2022-01-26

## 2022-01-26 RX ADMIN — DICYCLOMINE HYDROCHLORIDE 10 MG: 10 CAPSULE ORAL at 16:48

## 2022-01-26 RX ADMIN — MIRTAZAPINE 7.5 MG: 15 TABLET, FILM COATED ORAL at 21:31

## 2022-01-26 RX ADMIN — DICYCLOMINE HYDROCHLORIDE 10 MG: 10 CAPSULE ORAL at 13:09

## 2022-01-26 RX ADMIN — BUDESONIDE AND FORMOTEROL FUMARATE DIHYDRATE 2 PUFF: 80; 4.5 AEROSOL RESPIRATORY (INHALATION) at 20:41

## 2022-01-26 RX ADMIN — OXYCODONE HYDROCHLORIDE 5 MG: 5 TABLET ORAL at 16:47

## 2022-01-26 RX ADMIN — CLINDAMYCIN IN 5 PERCENT DEXTROSE 600 MG: 12 INJECTION, SOLUTION INTRAVENOUS at 13:08

## 2022-01-26 RX ADMIN — OXYCODONE HYDROCHLORIDE 5 MG: 5 TABLET ORAL at 00:29

## 2022-01-26 RX ADMIN — CEPHALEXIN 500 MG: 500 CAPSULE ORAL at 13:13

## 2022-01-26 RX ADMIN — FENTANYL CITRATE 50 MCG: 50 INJECTION INTRAMUSCULAR; INTRAVENOUS at 22:15

## 2022-01-26 RX ADMIN — DICYCLOMINE HYDROCHLORIDE 10 MG: 10 CAPSULE ORAL at 08:57

## 2022-01-26 RX ADMIN — CLINDAMYCIN IN 5 PERCENT DEXTROSE 600 MG: 12 INJECTION, SOLUTION INTRAVENOUS at 20:41

## 2022-01-26 RX ADMIN — DICYCLOMINE HYDROCHLORIDE 10 MG: 10 CAPSULE ORAL at 20:41

## 2022-01-26 RX ADMIN — PANTOPRAZOLE SODIUM 20 MG: 40 TABLET, DELAYED RELEASE ORAL at 16:48

## 2022-01-26 RX ADMIN — INSULIN GLARGINE 25 UNITS: 100 INJECTION, SOLUTION SUBCUTANEOUS at 16:49

## 2022-01-26 RX ADMIN — SODIUM CHLORIDE, PRESERVATIVE FREE 10 ML: 5 INJECTION INTRAVENOUS at 20:41

## 2022-01-26 RX ADMIN — ACETAMINOPHEN 650 MG: 325 TABLET ORAL at 20:40

## 2022-01-26 RX ADMIN — VENLAFAXINE HYDROCHLORIDE 150 MG: 150 CAPSULE, EXTENDED RELEASE ORAL at 08:59

## 2022-01-26 RX ADMIN — CEPHALEXIN 500 MG: 500 CAPSULE ORAL at 20:40

## 2022-01-26 RX ADMIN — FLUTICASONE PROPIONATE 1 SPRAY: 50 SPRAY, METERED NASAL at 08:59

## 2022-01-26 RX ADMIN — PREGABALIN 200 MG: 100 CAPSULE ORAL at 08:58

## 2022-01-26 RX ADMIN — DEXTROSE 15 G: 15 GEL ORAL at 03:50

## 2022-01-26 RX ADMIN — INSULIN LISPRO 4 UNITS: 100 INJECTION, SOLUTION INTRAVENOUS; SUBCUTANEOUS at 16:50

## 2022-01-26 RX ADMIN — PANTOPRAZOLE SODIUM 20 MG: 40 TABLET, DELAYED RELEASE ORAL at 06:16

## 2022-01-26 RX ADMIN — OXYCODONE HYDROCHLORIDE 5 MG: 5 TABLET ORAL at 13:08

## 2022-01-26 RX ADMIN — ENOXAPARIN SODIUM 30 MG: 100 INJECTION SUBCUTANEOUS at 20:40

## 2022-01-26 RX ADMIN — OXYCODONE HYDROCHLORIDE 5 MG: 5 TABLET ORAL at 04:31

## 2022-01-26 RX ADMIN — OXYCODONE HYDROCHLORIDE 5 MG: 5 TABLET ORAL at 20:40

## 2022-01-26 RX ADMIN — PREGABALIN 200 MG: 100 CAPSULE ORAL at 20:41

## 2022-01-26 RX ADMIN — OXYCODONE HYDROCHLORIDE 5 MG: 5 TABLET ORAL at 08:57

## 2022-01-26 RX ADMIN — INSULIN LISPRO 4 UNITS: 100 INJECTION, SOLUTION INTRAVENOUS; SUBCUTANEOUS at 13:09

## 2022-01-26 RX ADMIN — TRAZODONE HYDROCHLORIDE 150 MG: 100 TABLET ORAL at 20:41

## 2022-01-26 RX ADMIN — BUDESONIDE AND FORMOTEROL FUMARATE DIHYDRATE 2 PUFF: 80; 4.5 AEROSOL RESPIRATORY (INHALATION) at 09:02

## 2022-01-26 RX ADMIN — ENOXAPARIN SODIUM 30 MG: 100 INJECTION SUBCUTANEOUS at 08:59

## 2022-01-26 ASSESSMENT — ENCOUNTER SYMPTOMS
VOMITING: 0
CHEST TIGHTNESS: 0
EYE DISCHARGE: 0
SHORTNESS OF BREATH: 0
COLOR CHANGE: 0
APNEA: 0
COUGH: 0
ABDOMINAL PAIN: 1
ABDOMINAL DISTENTION: 0
CONSTIPATION: 0
NAUSEA: 0
COLOR CHANGE: 1
DIARRHEA: 0
BACK PAIN: 0

## 2022-01-26 ASSESSMENT — PAIN SCALES - GENERAL
PAINLEVEL_OUTOF10: 10
PAINLEVEL_OUTOF10: 9
PAINLEVEL_OUTOF10: 10
PAINLEVEL_OUTOF10: 7
PAINLEVEL_OUTOF10: 10
PAINLEVEL_OUTOF10: 10

## 2022-01-26 NOTE — ANESTHESIA PRE PROCEDURE
MISC 1 each by Does not apply route 3 times daily 11/15/21   Sami Hewitt MD   Alcohol Swabs 70 % PADS Use 1 swab 3 times daily as needed 11/15/21   Sami Hewitt MD   blood glucose monitor strips Test 3 times a day & as needed for symptoms of irregular blood glucose. Dispense sufficient amount for indicated testing frequency plus additional to accommodate PRN testing needs. 11/8/21   Britatnie Cardoza MD   Lancets MISC 1 each by Does not apply route 3 times daily 11/8/21   Brittanie Cardoza MD   Insulin Pen Needle ([employer] PEN NEEDLES 31G) 31G X 8 MM MISC 1 each by Does not apply route daily 11/8/21   Kayleigh King MD   FREESTYLE LITE strip 1 each by Does not apply route 3 times daily 11/11/20   Marilyn Fisher MD   albuterol sulfate  (90 Base) MCG/ACT inhaler Inhale 2 puffs into the lungs every 4 hours as needed for Wheezing 10/20/20 9/22/21  Marilyn Fisher MD   FLOVENT  MCG/ACT inhaler Inhale 2 puffs into the lungs 2 times daily 10/20/20   Marilyn Fisher MD   budesonide-formoterol (SYMBICORT) 80-4.5 MCG/ACT AERO Inhale 2 puffs into the lungs 2 times daily 10/20/20   Marilyn Fisher MD       Current medications:    No current facility-administered medications for this visit. No current outpatient medications on file.      Facility-Administered Medications Ordered in Other Visits   Medication Dose Route Frequency Provider Last Rate Last Admin    cephALEXin (KEFLEX) capsule 500 mg  500 mg Oral 4 times per day Maylin Rust MD   500 mg at 01/26/22 1313    insulin glargine (LANTUS) injection vial 25 Units  25 Units SubCUTAneous BID Jacque Posey MD   25 Units at 01/25/22 2032    enoxaparin (LOVENOX) injection 30 mg  30 mg SubCUTAneous BID Jacque Posey MD   30 mg at 01/26/22 0859    lidocaine PF 1 % injection 5 mL  5 mL IntraDERmal Once Bassam Ratliff DPM        albuterol sulfate  (90 Base) MCG/ACT inhaler 2 puff  2 puff Inhalation Q4H PRN Nat Angeles MD   2 puff at 01/24/22 2016  oxyCODONE (ROXICODONE) immediate release tablet 5 mg  5 mg Oral Q4H PRN Crow Barnhart MD   5 mg at 01/26/22 1308    clindamycin (CLEOCIN) 600 mg in dextrose 5 % 50 mL IVPB  600 mg IntraVENous Dennys Marlow  mL/hr at 01/26/22 1308 600 mg at 01/26/22 1308    budesonide-formoterol (SYMBICORT) 80-4.5 MCG/ACT inhaler 2 puff  2 puff Inhalation BID Crow Barnhart MD   2 puff at 01/26/22 0902    dicyclomine (BENTYL) capsule 10 mg  10 mg Oral 4x Daily Crow Barnhart MD   10 mg at 01/26/22 1309    fluticasone (FLONASE) 50 MCG/ACT nasal spray 1 spray  1 spray Each Nostril Daily Crow Barnhart MD   1 spray at 01/26/22 0859    mirtazapine (REMERON) tablet 7.5 mg  7.5 mg Oral Nightly Crow Barnhart MD   7.5 mg at 01/25/22 2028    pantoprazole (PROTONIX) tablet 20 mg  20 mg Oral BID AC Cuong Janine Montelongo MD   20 mg at 01/26/22 5256    pregabalin (LYRICA) capsule 200 mg  200 mg Oral BID Crow Barnhart MD   200 mg at 01/26/22 9978    traZODone (DESYREL) tablet 150 mg  150 mg Oral Nightly Crow Barnhart MD   150 mg at 01/25/22 2028    venlafaxine (EFFEXOR XR) extended release capsule 150 mg  150 mg Oral Daily with breakfast Crow Barnhart MD   150 mg at 01/26/22 3704    sodium chloride flush 0.9 % injection 5-40 mL  5-40 mL IntraVENous 2 times per day Crow Barnhart MD   10 mL at 01/23/22 9590    sodium chloride flush 0.9 % injection 5-40 mL  5-40 mL IntraVENous PRN Crow Barnhart MD        0.9 % sodium chloride infusion  25 mL IntraVENous PRN Crow Barnhart MD        ondansetron (ZOFRAN-ODT) disintegrating tablet 4 mg  4 mg Oral Q8H PRN Crow Barnhart MD        Or    ondansetron [facility]) injection 4 mg  4 mg IntraVENous Q6H PRN Crow Barnhart MD        polyethylene glycol [facility]) packet 17 g  17 g Oral Daily PRN Crow Barnhart MD        acetaminophen (TYLENOL) tablet 650 mg  650 mg Oral Q6H PRN Crow Barnhart MD   650 mg at 01/25/22 2251    Or    acetaminophen (TYLENOL) suppository 650 mg  650 mg Rectal Q6H PRN Crow Barnhart MD        0.9 % sodium chloride infusion IntraVENous Continuous Steven Kelly  mL/hr at 01/25/22 1153 New Bag at 01/25/22 1153    potassium chloride (KLOR-CON M) extended release tablet 40 mEq  40 mEq Oral PRN Steven Kelly MD        Or    potassium bicarb-citric acid (EFFER-K) effervescent tablet 40 mEq  40 mEq Oral PRN Steven Kelly MD        Or    potassium chloride 10 mEq/100 mL IVPB (Peripheral Line)  10 mEq IntraVENous PRN Steven Kelly MD        insulin lispro (HUMALOG) injection vial 0-12 Units  0-12 Units SubCUTAneous TID WC Steven Kelly MD   4 Units at 01/26/22 1309    insulin lispro (HUMALOG) injection vial 0-6 Units  0-6 Units SubCUTAneous Nightly Steven Kelly MD   1 Units at 01/25/22 2032    glucose (GLUTOSE) 40 % oral gel 15 g  15 g Oral PRN Steven Kelly MD   15 g at 01/26/22 0350    dextrose 50 % IV solution  12.5 g IntraVENous PRN Steven Kelly MD        glucagon (rDNA) injection 1 mg  1 mg IntraMUSCular PRN Steven Kelly MD        dextrose 5 % solution  100 mL/hr IntraVENous PRN Steven Kelly MD           Allergies: Allergies   Allergen Reactions    Bactrim [Sulfamethoxazole-Trimethoprim] Swelling    Adhesive Tape Rash       Problem List:    Patient Active Problem List   Diagnosis Code    IDDM (insulin dependent diabetes mellitus) [id number]    Lupus ([facility] M32.9    Post traumatic stress disorder (PTSD) F43.10    Acute cystitis with hematuria N30.01    Hyperglycemia R73.9    Suicidal ideation R45.851    Arthritis M19.90    Chronic back pain M54.9, G89.29    Acid reflux K21.9    History of stroke Z86.73    Polysubstance abuse ([facility]) F19.10    Cocaine abuse ([facility] F14.10    Chest pain R07.9    Acute non-recurrent maxillary sinusitis J01.00    Acute respiratory failure with hypercapnia ([facility]) J96.02    Alcohol use disorder, severe, dependence ([facility]) F10.20    Asthma exacerbation attacks J45. 901    Bilateral edema of lower extremity R60.0    Bipolar 1 disorder, depressed, severe ([facility]) F31.4    BMI 34.0-34.9,adult Z68.34    Cannabis use disorder, severe, dependence ([facility] F12.20    Cocaine use disorder, severe, dependence ([facility] F14.20    Dizziness R42    Dyslipidemia E78.5    Family history of colon cancer Z80.0    History of lupus [id number]    Homicidal ideation R45.850    Hypotension I95.9    Neuropathy G62.9    Normocytic anemia D64.9    Recurrent depression ([facility]) F33.9    Recurrent major depressive disorder, in partial remission ([facility]) F33.41    Severe recurrent major depression with psychotic features ([facility]) F33.3    Severe recurrent major depression without psychotic features ([facility]) F33.2    Upper GI bleed K92.2    Vitamin D deficiency E55.9    White matter changes [id number]    Gastroesophageal reflux disease K21.9    Stroke (cerebrum) ([facility]) I63.9    Rib lesion M89.9    Diabetes mellitus type 2 in obese ([facility]) E11.69, E66.9    YAEL (generalized anxiety disorder) F41.1    Posttraumatic stress disorder F43.10    Suicidal intent R45.851    Leg weakness, bilateral R29.898    Poorly controlled diabetes mellitus ([facility]) E11.65    Felon of finger L03.019    Osteomyelitis ([facility]) M86.9    Recurrent falls R29.6    Seasonal allergic rhinitis J30.2    Fibromyalgia M79.7    Tenosynovitis of finger M65.9    Open wound of right index finger S61.200A    Adverse effect of COVID-19 vaccine T50. B95A    Wound dehiscence T81.30XA    Amputation finger S68.119A    Abscess of right index finger L02.511    Type 2 diabetes mellitus without complication, with long-term current use of insulin ([facility]) E11.9, Z79.4    Major depression F32.9    Right foot infection L08.9    Cellulitis and abscess of toe of right foot L03.031, L02.611       Past Medical History:        Diagnosis Date    Acid reflux 5/29/2017    Acute cystitis with hematuria 10/11/2016    Acute non-recurrent maxillary sinusitis 11/28/2017    Asthma     Bipolar 1 disorder ([facility] 1/4/2018    [facility]     BMI 34.0-34.9,adult 11/28/2017    Cannabis use Erick Mcdonald MD at [address] [place] [address]        Social History:    Social History     Tobacco Use    Smoking status: Never Smoker    Smokeless tobacco: Never Used   Substance Use Topics    Alcohol use: Not Currently                                Counseling given: Not Answered      Vital Signs (Current): There were no vitals filed for this visit.                                            BP Readings from Last 3 Encounters:   01/26/22 (!) 89/53   01/20/22 118/71   12/22/21 (!) 149/89       NPO Status:                                                                                 BMI:   Wt Readings from Last 3 Encounters:   01/26/22 251 lb 15.8 oz (114.3 kg)   01/20/22 242 lb (109.8 kg)   12/22/21 233 lb (105.7 kg)     There is no height or weight on file to calculate BMI.    CBC:   Lab Results   Component Value Date    WBC 13.9 01/26/2022    RBC 3.01 01/26/2022    HGB 9.3 01/26/2022    HCT 30.2 01/26/2022    .3 01/26/2022    RDW 13.4 01/26/2022     01/26/2022       CMP:   Lab Results   Component Value Date     01/26/2022    K 3.6 01/26/2022     01/26/2022    CO2 19 01/26/2022    BUN 13 01/26/2022    CREATININE 0.94 01/26/2022    GFRAA >60 01/26/2022    LABGLOM >60 01/26/2022    GLUCOSE 64 01/26/2022    PROT 7.2 01/22/2022    CALCIUM 8.8 01/26/2022    BILITOT <0.10 01/22/2022    ALKPHOS 136 01/22/2022    AST 14 01/22/2022    ALT 14 01/22/2022       POC Tests:   Recent Labs     01/26/22  1102   POCGLU 234*       Coags:   Lab Results   Component Value Date    PROTIME 10.7 01/24/2022    INR 1.0 01/24/2022    APTT 22.4 07/07/2020       HCG (If Applicable): No results found for: PREGTESTUR, PREGSERUM, HCG, HCGQUANT     ABGs: No results found for: PHART, PO2ART, AWJ7IBP, LAN3OQV, BEART, I8NEURZA     Type & Screen (If Applicable):  No results found for: LABABO, LABRH    Drug/Infectious Status (If Applicable):  Lab Results   Component Value Date    HEPCAB NONREACTIVE 09/14/2021       COVID-19 Screening (If Applicable):   Lab Results   Component Value Date    COVID19 Not Detected 01/22/2022           Anesthesia Evaluation  Patient summary reviewed and Nursing notes reviewed  Airway: Mallampati: II  TM distance: >3 FB   Neck ROM: full  Mouth opening: > = 3 FB Dental:      Comment: Multiple missing    Pulmonary:normal exam    (+) asthma:                            Cardiovascular:    (+) hypertension: no interval change,       ECG reviewed  Rhythm: regular  Rate: normal    Stress test reviewed                Neuro/Psych:   (+) CVA: no interval change, neuromuscular disease:, headaches: migraine headaches, psychiatric history:depression/anxiety             GI/Hepatic/Renal:   (+) GERD:,           Endo/Other:    (+) DiabetesType II DM, using insulin, : arthritis: OA., .                 Abdominal:             Vascular: negative vascular ROS. Other Findings:             Anesthesia Plan      MAC     ASA 3       Induction: intravenous. Anesthetic plan and risks discussed with patient. Plan discussed with attending and CRNA.

## 2022-01-26 NOTE — PROGRESS NOTES
[facility]  Progress Note    Date:   1/26/2022  Patient name:  Faith Stroud  Date of admission:  1/22/2022  4:43 PM  MRN:   [id number]  YOB: 1965    SUBJECTIVE/Last 24 hours update:     Day 2 Hospitalization:   Patient was seen and examined at the bedside. No acute events overnight. S/P I&D of right foot and left foot debridement. Seen by ID yesterday, recommended addition of cefepime to clindamycin. Patient lost her IV line overnight. Attempts to place another IV failed. PICC team consulted. CT right done. Pending podiatry recs. Afebrile. VS stable. Feet pain controlled with pain meds. Patient is very anxious today. Denies chills, chest pain, SOB, nausea, vomiting. REVIEW OF SYSTEMS:      Review of Systems   Constitutional: Negative for activity change, appetite change, chills, fatigue and fever. HENT: Negative. Respiratory: Negative for cough, chest tightness and shortness of breath. Cardiovascular: Negative for chest pain, palpitations and leg swelling. Gastrointestinal: Positive for abdominal pain. Negative for constipation, diarrhea, nausea and vomiting. Genitourinary: Positive for dysuria. Negative for decreased urine volume, difficulty urinating, frequency and hematuria. Musculoskeletal: Negative for arthralgias, back pain and neck pain. Skin: Negative for color change. Neurological: Negative for dizziness, weakness, light-headedness, numbness and headaches.        PAST MEDICAL HISTORY:      has a past medical history of Acid reflux, Acute cystitis with hematuria, Acute non-recurrent maxillary sinusitis, Asthma, Bipolar 1 disorder ([address] Bipolar disorder, mixed ([address] BMI 34.0-34.9,adult, Cannabis use disorder, severe, dependence ([address] Cerebrovascular accident (CVA) ([address] Chest pain, Chronic renal insufficiency, Cocaine abuse ([address] COVID-19 virus RNA test result unknown, DDD (degenerative disc disease), cervical, Diabetes mellitus ([facility] Dizziness, Fibromyalgia, History of stroke, Homicidal ideation, Hyperglycemia, Hypertension, Hypotension, IDDM (insulin dependent diabetes mellitus), Lupus ([facility] Migraine, Neuropathy, Neuropathy, Polysubstance abuse ([facility] Post traumatic stress disorder (PTSD), Posttraumatic stress disorder, Recurrent depression ([facility] Recurrent major depressive disorder, in partial remission ([facility] Screening mammogram, encounter for, Severe recurrent major depression with psychotic features ([facility] Severe recurrent major depression without psychotic features ([address] Stroke (cerebrum) ([facility] Stroke ([facility] Suicidal ideation, Suicidal intent, Vitamin D deficiency, and White matter changes. PAST SURGICAL HISTORY:      has a past surgical history that includes Abscess Drainage; chest tube insertion; Abdomen surgery; Cataract removal with implant (Bilateral); LASIK (Bilateral); Finger amputation (Left, 03/13/2021); Hand surgery (Right, 09/14/2021); Hand surgery (Right, 09/15/2021); Finger amputation (Right, 10/11/2021); and Finger amputation (Right, 10/11/2021). SOCIALHISTORY:      reports that she has never smoked. She has never used smokeless tobacco. She reports previous alcohol use. She reports current drug use. Drugs: Marijuana (Weed) and Cocaine. FAMILY HISTORY:      family history includes Diabetes in her brother, father, mother, and sister; No Known Problems in her sister; Other in her son; Stroke in her mother. HOME MEDICATIONS:      Prior to Admission medications    Medication Sig Start Date End Date Taking? Authorizing Provider   pregabalin (LYRICA) 200 MG capsule Take 1 capsule by mouth 2 times daily for 30 days.  1/20/22 2/19/22  Taylor Garza MD   insulin glargine (LANTUS SOLOSTAR) 100 UNIT/ML injection pen Inject 30 Units into the skin 2 times daily 1/20/22   Taylor Garza MD   doxycycline hyclate (VIBRA-TABS) 100 MG tablet Take 1 tablet by mouth 2 times daily for 7 days 1/20/22 1/27/22  Peri Adams MD   fluticasone [facility]) 50 MCG/ACT nasal spray 1 spray by Each Nostril route daily 12/22/21   Flori Ibarra MD   acetaminophen (TYLENOL) 500 MG tablet Take 2 tablets by mouth 3 times daily as needed for Pain 12/22/21   Flori Ibarra MD   dicyclomine (BENTYL) 10 MG capsule Take 1 capsule by mouth 4 times daily 12/17/21   Flori Ibarra MD   metFORMIN (GLUCOPHAGE) 1000 MG tablet Take 1 tablet by mouth 2 times daily (with meals) 12/17/21   Flori Ibarra MD   mirtazapine (REMERON) 7.5 MG tablet Take 1 tablet by mouth nightly 12/17/21   Flori bIarra MD   pantoprazole (PROTONIX) 20 MG tablet Take 1 tablet by mouth 2 times daily (before meals) 12/17/21   Flori Ibarra MD   traZODone (DESYREL) 150 MG tablet Take 1 tablet by mouth nightly 12/17/21   Flori Ibarra MD   venlafaxine (EFFEXOR XR) 150 MG extended release capsule Take 1 capsule by mouth daily (with breakfast) 12/17/21   Flori Ibarra MD   ibuprofen (ADVIL;MOTRIN) 800 MG tablet Take 1 tablet by mouth every 8 hours as needed for Pain 11/15/21 11/29/21  Jamee Haro MD   Lancets MISC 1 each by Does not apply route 3 times daily 11/15/21   Jamee Haro MD   Alcohol Swabs 70 % PADS Use 1 swab 3 times daily as needed 11/15/21   Jamee Haro MD   blood glucose monitor strips Test 3 times a day & as needed for symptoms of irregular blood glucose. Dispense sufficient amount for indicated testing frequency plus additional to accommodate PRN testing needs.  11/8/21   Samanta Rdz MD   Lancets MISC 1 each by Does not apply route 3 times daily 11/8/21   Samanta Rdz MD   Insulin Pen Needle ([employer] PEN NEEDLES 31G) 31G X 8 MM MISC 1 each by Does not apply route daily 11/8/21   Ivonne Sanders MD   FREESTYLE LITE strip 1 each by Does not apply route 3 times daily 11/11/20   Flori Ibarra MD   albuterol sulfate  (90 Base) MCG/ACT inhaler Inhale 2 puffs into the lungs every 4 hours as needed for Wheezing 10/20/20 9/22/21  Sam Wade MD   FLOVENT  MCG/ACT inhaler Inhale 2 puffs into the lungs 2 times daily 10/20/20   Sam Waed MD   budesonide-formoterol (SYMBICORT) 80-4.5 MCG/ACT AERO Inhale 2 puffs into the lungs 2 times daily 10/20/20   aSm Wade MD       ALLERGIES:     Bactrim [sulfamethoxazole-trimethoprim] and Adhesive tape    OBJECTIVE:       Vitals:    01/25/22 1213 01/25/22 1944 01/26/22 0600 01/26/22 0707   BP: 127/63 (!) 149/86  (!) 89/53   Pulse: 93 101  84   Resp: 16 16  18   Temp: 98.4 °F (36.9 °C) 99.7 °F (37.6 °C)  97.8 °F (36.6 °C)   TempSrc: Oral Oral  Oral   SpO2: 99% 95%  95%   Weight:   251 lb 15.8 oz (114.3 kg)    Height:             Intake/Output Summary (Last 24 hours) at 1/26/2022 0729  Last data filed at 1/26/2022 0355  Gross per 24 hour   Intake --   Output 1250 ml   Net -1250 ml       PHYSICAL EXAM:    Physical Exam  Vitals and nursing note reviewed. Constitutional:       General: She is not in acute distress. Appearance: Normal appearance. She is not ill-appearing. HENT:      Head: Normocephalic and atraumatic. Mouth/Throat:      Pharynx: Oropharynx is clear. Cardiovascular:      Rate and Rhythm: Normal rate and regular rhythm. Pulses: Normal pulses. Heart sounds: No murmur heard. Pulmonary:      Effort: Pulmonary effort is normal.      Breath sounds: Normal breath sounds. Abdominal:      Palpations: Abdomen is soft. There is no mass. Tenderness: There is no abdominal tenderness. Musculoskeletal:         General: No tenderness. Normal range of motion. Cervical back: Normal range of motion and neck supple. Right lower leg: Edema present. Left lower leg: Edema present. Neurological:      General: No focal deficit present. Mental Status: She is alert and oriented to person, place, and time.          ASSESSMENT:      Active Problems:    Type 2 diabetes mellitus without complication, with long-term current use of insulin (HCC)    Right foot infection    Cellulitis and abscess of toe of right foot  Resolved Problems:    * No resolved hospital problems. *      PLAN:        RLE foot wound/infection  - Tmax 100.4  - leukocytosis, elevated CRP  - X-ray revealed no foreign body in foot. However moderate right foot swelling.  - S/P I&D of right foot and left foot debridement. - on IV clindamycin and cefepime  - pain control  -  ml/hr  - podiatry consulted, follow up recommendations  -ID consulted- appreciate recs.   - elevated D-dime  -Venous doppler shows chronic DVT  -Continue DVT prophylaxis, lovenos      UTI   Started on Cefepime    DM2  - A1C 10  -POCT glucose  -on lantus 20U BID  - medium dose ISS  - hypoglycemia protocol     Bipolar disorder  - stable  - continue remeron 7.5 mg PO nightly  - effexor  mg po daily  - trazadone 150 mg PO nightly    Plan will be discussed with the attending, Aminata Loo MD  Family Medicine Resident  1/26/2022 7:29 AM

## 2022-01-26 NOTE — TELEPHONE ENCOUNTER
----- Message from Bayron Marie MD sent at 1/26/2022  1:43 PM EST -----  Jimmy Ingram,    Could you please inform patient of the negative pap smear results?     Thanks!  ----- Message -----  From: Shoaib Serrano Incoming Lab Results From [facility]  Sent: 1/4/2022   2:43 PM EST  To: Bayron Marie MD

## 2022-01-26 NOTE — PROGRESS NOTES
Physical Therapy  Facility/Department: [address] ORTHO/MED SURG  Daily Treatment Note  NAME: Vicky Guevara  : 1965  MRN: [id number]    Date of Service: 2022    Discharge Recommendations:  Patient would benefit from continued therapy after discharge   PT Equipment Recommendations  Equipment Needed: No    Assessment   Assessment: Pt performed therapeutic exercise while supine in bed. Pt is refusing to attempt to sit EOB or perform OOB activities this date due to c/o increased pain with mobility. Pt was educated on the importance of early mobility and the role of PT in the POC. Pt would benefit from continued PT following discharge to address functional deficits and regain prior level of independence. Prognosis: Good  Decision Making: Medium Complexity  PT Education: Goals;PT Role;Plan of Care  REQUIRES PT FOLLOW UP: Yes  Activity Tolerance  Activity Tolerance: Patient limited by fatigue;Patient limited by endurance; Patient limited by pain     Patient Diagnosis(es): The primary encounter diagnosis was Right foot infection. A diagnosis of Elevated d-dimer was also pertinent to this visit.      has a past medical history of Acid reflux, Acute cystitis with hematuria, Acute non-recurrent maxillary sinusitis, Asthma, Bipolar 1 disorder ([address] Bipolar disorder, mixed ([address] BMI 34.0-34.9,adult, Cannabis use disorder, severe, dependence ([address] Cerebrovascular accident (CVA) ([address] Chest pain, Chronic renal insufficiency, Cocaine abuse ([address] COVID-19 virus RNA test result unknown, DDD (degenerative disc disease), cervical, Diabetes mellitus ([address] Dizziness, Fibromyalgia, History of stroke, Homicidal ideation, Hyperglycemia, Hypertension, Hypotension, IDDM (insulin dependent diabetes mellitus), Lupus ([address] Migraine, Neuropathy, Neuropathy, Polysubstance abuse ([address] Post traumatic stress disorder (PTSD), Posttraumatic stress disorder, Recurrent depression ([address] Recurrent major depressive disorder, in partial remission ([facility] Screening mammogram, encounter for, Severe recurrent major depression with psychotic features ([facility] Severe recurrent major depression without psychotic features ([facility] Stroke (cerebrum) ([facility] Stroke ([facility] Suicidal ideation, Suicidal intent, Vitamin D deficiency, and White matter changes. has a past surgical history that includes Abscess Drainage; chest tube insertion; Abdomen surgery; Cataract removal with implant (Bilateral); LASIK (Bilateral); Finger amputation (Left, 03/13/2021); Hand surgery (Right, 09/14/2021); Hand surgery (Right, 09/15/2021); Finger amputation (Right, 10/11/2021); and Finger amputation (Right, 10/11/2021). Restrictions  Restrictions/Precautions  Restrictions/Precautions: Weight Bearing,Up as Tolerated  Required Braces or Orthoses?: No  Lower Extremity Weight Bearing Restrictions  Right Lower Extremity Weight Bearing: Weight Bearing As Tolerated  Left Lower Extremity Weight Bearing: Weight Bearing As Tolerated  Subjective   General  Chart Reviewed: Yes  Response To Previous Treatment: Patient with no complaints from previous session. Family / Caregiver Present: No  Subjective  Subjective: RN and pt agreeable to PT.   Pain Screening  Patient Currently in Pain: Yes  Pain Assessment  Pain Assessment: 0-10  Pain Level: 10  Vital Signs  Patient Currently in Pain: Yes       Orientation  Orientation  Overall Orientation Status: Within Normal Limits  Cognition   Cognition  Overall Cognitive Status: Exceptions  Safety Judgement: Decreased awareness of need for assistance;Decreased awareness of need for safety  Objective   Bed mobility  Supine to Sit: Unable to assess  Sit to Supine: Unable to assess  Scooting: Unable to assess  Transfers  Sit to Stand: Unable to assess  Stand to sit: Unable to assess  Ambulation  Ambulation?: No  More Ambulation?: No        Exercises  Straight Leg Raise: x15 BLE  Heelslides: x15 BLE  Hip Flexion: x15 BLE  Hip Abduction: x15 BLE  Knee Short Arc Quad: x15 BLE  Ankle Pumps: x20 BLE  Other exercises  Other exercises?: No                                     AM-PAC Score  AM-PAC Inpatient Mobility Raw Score : 18 (01/26/22 1350)  AM-PAC Inpatient T-Scale Score : 43.63 (01/26/22 1350)  Mobility Inpatient CMS 0-100% Score: 46.58 (01/26/22 1350)  Mobility Inpatient CMS G-Code Modifier : CK (01/26/22 1350)          Goals  Short term goals  Time Frame for Short term goals: 10 visits  Short term goal 1: transfers with SBA  Short term goal 2: amb 75 ft with a RW x SBA with WBAT B LE's  Short term goal 3: to be independent with bed mobility  Short term goal 4: 20 min exercise program x SBA  Patient Goals   Patient goals : Return home    Plan    Plan  Times per week: 5-6x wk  Times per day: Daily  Current Treatment Recommendations: Strengthening,Functional Mobility Training,Gait Training,Safety Education & Training,Endurance Training,Pain Management  Safety Devices  Type of devices: Nurse notified,Left in bed,Call light within reach  Restraints  Initially in place: No     Therapy Time   Individual Concurrent Group Co-treatment   Time In 1314         Time Out 1337         Minutes 23         Timed Code Treatment Minutes: Sarai 17, PTA

## 2022-01-26 NOTE — PROGRESS NOTES
Dr. Prosper Dennison notified of pt losing IV access. RN supervisor attempted and there is no staff tonight with ultrasound. RN put consult in for PICC team to come and place IV.

## 2022-01-26 NOTE — PROGRESS NOTES
[facility] -   Infectious diseases evaluation  admission date 1/22/2022    reason for consultation:   Diabetic foot infection    Impression :   Current:  · Right diabetic foot infection, GAS  · Cellulitis both feet  · bandemia  · Bacteriuria vs UTI kleb S ancef    Other:  ·   Discussion / summary of stay / plan of care   ·   Recommendations   · clindamycin iv  · Elevate feet and see if cellulitis resolves  · I/D right foot abscess 1/27  · Wound Cx 1/23: Strep Pyogenes  · UC 1/24 : Klebsiella- no dysuria - but lower abd pain - on keflex till 1/29  ·     Infection Control Recommendations   · [place] Precautions  · Contact Isolation       Antimicrobial Stewardship Recommendations   · Simplification of therapy  · Targeted therapy  Coordination ofOutpatient Care:   · Estimated Length of IV antimicrobials:  · Patient will need Midline / picc Catheter Insertion:   · Patient will need SNF:  · Patient will need outpatient wound care:     History of Present Illness:   Initial history:  Negra Escobar is a 49y.o.-year-old female with diabetes very well-known to my service, stepped on a piece of glass a few days prior to admission presented with swelling and drainage from right foot, culture came back with group A strep,  Patient has neuropathy from diabetes  WBC elevated 14,  sed [id number],     Podiatry debrided the superficial ulcer and felt it was not too deep yet there was cellulitis of both feet. Hemoglobin A1c was 10    MRI of the left foot no osteomyelitis  MRI of the right foot no osteomyelitis    Leukocytosis responded to antibiotics, infectious consulted for antibiotic management  Patient is on cefepime and clindamycin            R foot very swollen and tender to light touch - suspect deeper involvement and needs more I/D    1/26  Pt is afebrile and stable this morning. WBC trending up (14 today). UC 1/24 growing Klebsiella.    CT right leg 1/25 shows an abscess  Planned for I/D on 1/27  Pain still ++      Interval changes  1/26/2022   Patient Vitals for the past 8 hrs:   BP Temp Temp src Pulse Resp SpO2   01/26/22 0707 (!) 89/53 97.8 °F (36.6 °C) Oral 84 18 95 %       Summary of relevant labs:  Labs:  [id number]  [id number]    Micro:  cx foot pus GAS  Uc 1/24: Klebsiella  Imaging:      I have personally reviewed the past medical history, past surgical history, medications, social history, and family history, and I haveupdated the database accordingly. Allergies:   Bactrim [sulfamethoxazole-trimethoprim] and Adhesive tape     Review of Systems:     Review of Systems   Constitutional: Negative for activity change. HENT: Negative for congestion. Eyes: Negative for discharge. Respiratory: Negative for apnea. Cardiovascular: Negative for chest pain. Gastrointestinal: Negative for abdominal distention. Endocrine: Negative for cold intolerance. Genitourinary: Negative for dysuria. Musculoskeletal: Negative for arthralgias. Skin: Positive for color change and wound. Allergic/Immunologic: Negative for immunocompromised state. Neurological: Negative for dizziness, light-headedness and headaches. Hematological: Negative for adenopathy. Psychiatric/Behavioral: Negative for agitation. Physical Examination :       Physical Exam  Constitutional:       General: She is not in acute distress. Appearance: Normal appearance. She is not ill-appearing or diaphoretic. HENT:      Head: Normocephalic and atraumatic. Nose: Nose normal. No congestion. Eyes:      General: No scleral icterus. Pupils: Pupils are equal, round, and reactive to light. Cardiovascular:      Rate and Rhythm: Normal rate and regular rhythm. Heart sounds: Normal heart sounds. No murmur heard. Pulmonary:      Effort: No respiratory distress. Breath sounds: Normal breath sounds. Abdominal:      General: There is no distension.       Palpations: Abdomen is soft. Genitourinary:     Comments: Urine fatuma  Musculoskeletal:         General: Swelling present. No tenderness. Cervical back: Neck supple. No rigidity. Skin:     General: Skin is dry. Coloration: Skin is not jaundiced. Findings: Erythema present. Neurological:      Mental Status: She is alert and oriented to person, place, and time. Mental status is at baseline. Psychiatric:         Mood and Affect: Mood normal.         Thought Content:  Thought content normal.         Past Medical History:     Past Medical History:   Diagnosis Date    Acid reflux 5/29/2017    Acute cystitis with hematuria 10/11/2016    Acute non-recurrent maxillary sinusitis 11/28/2017    Asthma     Bipolar 1 disorder ([address] 1/4/2018    Bipolar disorder, mixed ([address]     BMI 34.0-34.9,adult 11/28/2017    Cannabis use disorder, severe, dependence ([address] 12/19/2017    Cerebrovascular accident (CVA) ([address] 6/14/2017    Chest pain 11/5/2016    Chronic renal insufficiency     Cocaine abuse ([address] 1/5/2018    COVID-19 virus RNA test result unknown     DDD (degenerative disc disease), cervical     Diabetes mellitus ([address]     Dizziness 6/13/2017    Fibromyalgia     History of stroke 9/9/2017    Homicidal ideation 11/6/2017    Hyperglycemia     Hypertension     Hypotension 1/18/2019    IDDM (insulin dependent diabetes mellitus) 12/21/2015    Lupus ([address]     Migraine     Neuropathy     Neuropathy     Polysubstance abuse ([address] 9/17/2017    Post traumatic stress disorder (PTSD)     Posttraumatic stress disorder 5/29/2017    Recurrent depression ([address] 5/29/2017    Recurrent major depressive disorder, in partial remission ([address] 11/28/2017    Screening mammogram, encounter for 11/28/2017    Severe recurrent major depression with psychotic features ([address] 12/19/2017    Severe recurrent major depression without psychotic features ([address] 12/19/2017    Stroke (cerebrum) ([address] 6/14/2017    Stroke ([address]     per chart notes    Suicidal ideation     Suicidal intent 3/10/2017    Vitamin D deficiency 11/28/2017    White matter changes 6/13/2017       Past Surgical  History:     Past Surgical History:   Procedure Laterality Date    ABDOMEN SURGERY      drain tube    ABSCESS DRAINAGE      right buttock    CATARACT REMOVAL WITH IMPLANT Bilateral     CHEST TUBE INSERTION      FINGER AMPUTATION Left 03/13/2021    LEFT RING FINER AMPUTATION performed by [facility]ul [facility] at [facility] Right 10/11/2021    AMPUTATION RIGHT INDEX FINGER    FINGER AMPUTATION Right 10/11/2021    AMPUTATION RIGHT INDEX FINGER performed by Anshul Hernandez MD at [address] [place], [place] [address]  Right 09/14/2021    I&D INDEX FINGER performed by Anshul Hernandez MD at [address] [place], [place] [address] Right 09/15/2021    I&D INDEX FINGER performed by Anshul Hernandez MD at [address] [place] [address] Bilateral        Medications:      cephALEXin  500 mg Oral 4 times per day    insulin glargine  25 Units SubCUTAneous BID    enoxaparin  30 mg SubCUTAneous BID    lidocaine PF  5 mL IntraDERmal Once    clindamycin (CLEOCIN) IV  600 mg IntraVENous Q8H    budesonide-formoterol  2 puff Inhalation BID    dicyclomine  10 mg Oral 4x Daily    fluticasone  1 spray Each Nostril Daily    mirtazapine  7.5 mg Oral Nightly    pantoprazole  20 mg Oral BID AC    pregabalin  200 mg Oral BID    traZODone  150 mg Oral Nightly    venlafaxine  150 mg Oral Daily with breakfast    sodium chloride flush  5-40 mL IntraVENous 2 times per day    insulin lispro  0-12 Units SubCUTAneous TID WC    insulin lispro  0-6 Units SubCUTAneous Nightly       Social History:     Social History     Socioeconomic History    Marital status: Legally      Spouse name: Not on file    Number of children: Not on file    Years of education: Not on file    Highest education level: Not on file   Occupational History    Not on file   Tobacco Use    Smoking status: Never Smoker    Smokeless tobacco: Never Used   Vaping Use    Vaping Use: Never used   Substance and Sexual Activity    Alcohol use: Not Currently    Drug use: Yes     Types: Marijuana Eston Kanwal), Cocaine     Comment: + Cocaine 2/2021 also, see Care Everywhere UDS    Sexual activity: Not Currently     Partners: Male   Other Topics Concern    Not on file   Social History Narrative    Not on file     Social Determinants of Health     Financial Resource Strain: High Risk    Difficulty of Paying Living Expenses: Hard   Food Insecurity: Food Insecurity Present    Worried About Running Out of Food in the Last Year: Often true    Kelin of Food in the Last Year: Often true   Transportation Needs: Unmet Transportation Needs    Lack of Transportation (Medical):  Yes    Lack of Transportation (Non-Medical): Yes   Physical Activity: Inactive    Days of Exercise per Week: 0 days    Minutes of Exercise per Session: 0 min   Stress: Stress Concern Present    Feeling of Stress : Rather much   Social Connections:     Frequency of Communication with Friends and Family: Not on file    Frequency of Social Gatherings with Friends and Family: Not on file    Attends Anabaptism Services: Not on file    Active Member of Clubs or Organizations: Not on file    Attends Club or Organization Meetings: Not on file    Marital Status: Not on file   Intimate Partner Violence:     Fear of Current or Ex-Partner: Not on file    Emotionally Abused: Not on file    Physically Abused: Not on file    Sexually Abused: Not on file   Housing Stability: High Risk    Unable to Pay for Housing in the Last Year: Yes    Number of [place] in the Last Year: 2    Unstable Housing in the Last Year: Yes       Family History:     Family History   Problem Relation Age of Onset    Diabetes Mother     Stroke Mother     Diabetes Father     Diabetes Sister     Diabetes Brother     Other Son         GSW    No Known Problems Sister       Medical Decision Making:   I have independently reviewed/ordered the following labs:    CBC with Differential:   Recent Labs     01/25/22  0521 01/26/22  0404   WBC 12.5* 13.9*   HGB 8.0* 9.3*   HCT 26.3* 30.2*    391   LYMPHOPCT 26 34   MONOPCT 14* 12     BMP:  Recent Labs     01/25/22  0521 01/26/22  0404    133*   K 4.0 3.6*    101   CO2 21 19*   BUN 17 13   CREATININE 0.77 0.94*     Hepatic Function Panel:   No results for input(s): PROT, LABALBU, BILIDIR, IBILI, BILITOT, ALKPHOS, ALT, AST in the last 72 hours. No results for input(s): RPR in the last 72 hours. No results for input(s): HIV in the last 72 hours. No results for input(s): BC in the last 72 hours. Lab Results   Component Value Date    CREATININE 0.94 01/26/2022    GLUCOSE 64 01/26/2022       Detailed results: Thank you for allowing us to participate in the care of this patient. Please call with questions. This note is created with the assistance of a speech recognition program.  While intending to generate adocument that actually reflects the content of the visit, the document can still have some errors including those of syntax and sound a like substitutions which may escape proof reading. It such instances, actual meaningcan be extrapolated by contextual diversion. Justin Goodman MS4 on behalf of Dr. Presley Maloney        I have discussed the care of the patient, including pertinent history and exam findings,  with the resident. I have seen and examined the patient and the key elements of all parts of the encounter have been performed by me. I agree with the assessment, plan and orders as documented by the resident.     Maylin Puga, Infectious Diseases

## 2022-01-26 NOTE — CARE COORDINATION
[address] Quality Flow/Interdisciplinary Rounds Progress Note    Quality Flow Rounds held on January 26, 2022 at [address] Attending:  Bedside Nurse and     Barriers to Discharge: ID final recommendations, OR tomorrow per Podiatry for I&D    Anticipated Discharge Date:       Anticipated Discharge Disposition:    Readmission Risk              Risk of Unplanned Readmission:  47           Discussed patient goal for the day, patient clinical progression, and barriers to discharge. The following Goal(s) of the Day/Commitment(s) have been identified: Follow- up on SNF referrals for acceptance. 18[facility] and spoke with Cinthia Pittman at [facility] re: referral, she received referral and sent it the DON to review for acceptance. [phone] Call from Alfredo Rae at [facility] she states they are unable to accept patient due to past behaviors she had at their facility.       iSncere Steele RN  January 26, 2022

## 2022-01-26 NOTE — PROGRESS NOTES
Pt's BS 47. Pt awake alert and oriented, can tell it was low. RN gave pt prn oral gel glucose, ice cream, osmin crackers and juice. Rechecked and BS is 71. Will continue to monitor.

## 2022-01-26 NOTE — PROGRESS NOTES
Progress Note  Podiatric Medicine and Surgery     Subjective     CC: right foot pain    Patient seen and examined at bedside. Continue to complain RLE pain, especially behind the knee. HPI :  Isabel Chua is a 49 y.o. female seen at [facility] for right foot pain. Patient stepped on a piece of glass a few days ago. She went to her PCP when PCP removed the glass. She presented to the ED last night due to increased pain and swelling to the right lower extremity. Upon arrival, X-ray was taken which showed no foreign body, acute osseous deformity or soft tissue emphysema. During my evaluation, patient also mentioned pain on her plantar left foot and  pain is not as bad as her right foot. Per chart review, patient visited [facility] podiatry clinic a year ago for diabetic foot care but never follows up after that. 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 Her last A1C in 12/13/2021 is 13.3%. She admits neuropathic pain to bilateral lower extremity. She denies other pedal complaints. ROS: Denies N/V/F/C/SOB/CP. Otherwise negative except at stated in the HPI.      Medications:  Scheduled Meds:   cefepime  2,000 mg IntraVENous Q12H    insulin glargine  25 Units SubCUTAneous BID    enoxaparin  30 mg SubCUTAneous BID    lidocaine PF  5 mL IntraDERmal Once    clindamycin (CLEOCIN) IV  600 mg IntraVENous Q8H    budesonide-formoterol  2 puff Inhalation BID    dicyclomine  10 mg Oral 4x Daily    fluticasone  1 spray Each Nostril Daily    mirtazapine  7.5 mg Oral Nightly    pantoprazole  20 mg Oral BID AC    pregabalin  200 mg Oral BID    traZODone  150 mg Oral Nightly    venlafaxine  150 mg Oral Daily with breakfast    sodium chloride flush  5-40 mL IntraVENous 2 times per day    insulin lispro  0-12 Units SubCUTAneous TID WC    insulin lispro  0-6 Units SubCUTAneous Nightly       Continuous Infusions:   sodium chloride      sodium chloride 100 mL/hr at 01/25/22 1153    dextrose PRN Meds:albuterol sulfate HFA, oxyCODONE, sodium chloride flush, sodium chloride, ondansetron **OR** ondansetron, polyethylene glycol, acetaminophen **OR** acetaminophen, potassium chloride **OR** potassium alternative oral replacement **OR** potassium chloride, glucose, dextrose, glucagon (rDNA), dextrose    Objective     Vitals:  Patient Vitals for the past 8 hrs:   BP Temp Temp src Pulse Resp SpO2 Weight   22 0707 (!) 89/53 97.8 °F (36.6 °C) Oral 84 18 95 % --   22 0600 -- -- -- -- -- -- 251 lb 15.8 oz (114.3 kg)     Average, Min, and Max for last 24 hours Vitals:  TEMPERATURE:  Temp  Av.6 °F (37 °C)  Min: 97.8 °F (36.6 °C)  Max: 99.7 °F (37.6 °C)    RESPIRATIONS RANGE: Resp  Av.7  Min: 16  Max: 18    PULSE RANGE: Pulse  Av.7  Min: 84  Max: 101    BLOOD PRESSURE RANGE:  Systolic (08PJO), AZA:513 , Min:89 , MRI:408   ; Diastolic (09FHT), EOM:96, Min:53, Max:86      PULSE OXIMETRY RANGE: SpO2  Av.3 %  Min: 95 %  Max: 99 %    I/O last 3 completed shifts:  In: -   Out:  [Urine:]    CBC:  Recent Labs     22  0451 22  0521 22  0404   WBC 10.9 12.5* 13.9*   HGB 8.1* 8.0* 9.3*   HCT 26.0* 26.3* 30.2*    343 391        BMP:  Recent Labs     22  0451 22  0521 22  0404    136 133*   K 4.4 4.0 3.6*    106 101   CO2 20 21 19*   BUN 19 17 13   CREATININE 0.89 0.77 0.94*   GLUCOSE 200* 148* 64*   CALCIUM 8.1* 8.6 8.8        Coags:  Recent Labs     22  0757   INR 1.0       Lab Results   Component Value Date    SEDRATE 124 (H) 2022     No results for input(s): CRP in the last 72 hours. Lower Extremity Physical Exam:  Vascular: DP and PT pulses are palpable. CFT <3 seconds to all digits. Hair growth is absent to the level of the digits.        Neuro: Saph/sural/SP/DP/plantar sensation diminished to light touch.     Musculoskeletal: Muscle strength is 5/5 to all lower extremity muscle groups.  Gross deformity is status post left 4th and 5th digit amputation. Tenderness to palpation to previous entry point of injury on the right foot and hyperkeratotic tissue on the left foot.      Dermatologic:     Diffuse edema noted throughout the right lower extremity with moderate erythema and increased warmth - improved     Right foot: full thickness ulcer #1 located right medial arch and measures approximately 2cm x 1cm x 0.4cm. Base is fibrotic. Sloughing skin noted to drained abscess noted above. Purulent drainage noted with no associated mal odor. Does not probe to bone but probe deep to subcutaneous layer. Tracking noted laterally  Define abscess noted around wound. Left foot: Full thickness ulcer #2 located lateral plantar left foot and measures approximately 1cmx 1cmx0.2cm. Base is fibrotic. Small amount of purulent drainage noted with no associated mal odor. Does not probe to bone, sinus track, or undermine. No fluctuance, crepitus, or induration. Interdigital maceration absent. Clinical Images:                          Imaging:   CT FOOT RIGHT W CONTRAST   Final Result   1. Shallow soft tissue ulceration plantar and medial to the 1st metatarsal   base with an underlying area of possible abscess versus phlegmon measuring   2.8 x 1.1 x 0.9 cm.   2. Extensive subcutaneous fat stranding compatible with cellulitis. No soft   tissue gas. 3. No evidence of osteomyelitis or other acute osseous abnormality. US RENAL COMPLETE   Final Result   Unremarkable renal ultrasound. MRI FOOT RIGHT WO CONTRAST   Final Result   Subcutaneous edema and swelling, most notably affecting the dorsum of the   foot, which may reflect underlying cellulitis. No discrete organized fluid   collection is seen within the limitations of a noncontrast study. No imaging features of acute osteomyelitis appreciated.          MRI FOOT LEFT WO CONTRAST   Final Result   Limited evaluation due to patient motion artifact on several sequences. Skin thickening and subcutaneous edema seen within the plantar soft tissues   of the foot at the level of the proximal metatarsals, which may reflect   underlying cellulitis. Within the limitations of a noncontrast study, no   discrete organized fluid collection is seen. 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 VL DUP LOWER EXTREMITY VENOUS BILATERAL   Final Result      XR FOOT RIGHT (MIN 3 VIEWS)   Final Result   Right foot: No radiopaque foreign body. No fracture. Moderate soft tissue   swelling within the anterior aspect of plantar aspect of the foot   predominantly underlying meta tarsal heads. Left foot: Changes related to prior partial amputation of 4th and 5th ray as   described. No acute fracture. No radiopaque foreign body. Moderate soft   tissue swelling and plantar aspect of the foot. No discrete soft tissue ulcer   is noted. XR FOOT LEFT (MIN 3 VIEWS)   Final Result   Right foot: No radiopaque foreign body. No fracture. Moderate soft tissue   swelling within the anterior aspect of plantar aspect of the foot   predominantly underlying meta tarsal heads. Left foot: Changes related to prior partial amputation of 4th and 5th ray as   described. No acute fracture. No radiopaque foreign body. Moderate soft   tissue swelling and plantar aspect of the foot. No discrete soft tissue ulcer   is noted. Cultures: Group A strep. anaerobe contaminated. Erica Cannon is a 49 y.o. female with   1. Diabetic foot ulcer to subcutaneous layer, bilateral feet  2. Cellulitis, bilateral feet  3. Superficial abscess, bilateral feet  4. Poorly uncontrolled DM2 with peripheral neuropathy.    5. S/p bedside I&D right foot    Active Problems:    Type 2 diabetes mellitus without complication, with long-term current use of insulin (HCC)    Right foot infection    Cellulitis and abscess of toe of right foot  Resolved Problems:    * No resolved hospital problems. *       Plan     · Patient examined and evaluated at bedside   · Treatment options discussed in detail with the patient. · Antibiotic: Clindamycin and cefipime per ID. Appreciate recommendation   · Medical management per primary. · MRI bilateral feet: no definite deep abscess or osteomyelitis. · DVT scan: chronic DVT to RLE. · Dressing applied to right foot: iodoform packing, dsd, ace  · CT scan on 1/25 shows possible abscess vs. Phlegmon  · On my exam today, there seems to be a define abscess underneath the wound. Plan OR tomorrow at 15.   · Consent to be signed  · Foot to be marked  · NPO at midnight  · Morning AC held  · Patient is COVID vaccinated  · Dressing changed to left foot: betadine, dsd, ace  · WBAT to Bilateral lower extremity  · Discussed with Bebeto Lugo 26   Podiatric Medicine & Surgery   1/26/2022 at 9:59 AM

## 2022-01-27 ENCOUNTER — APPOINTMENT (OUTPATIENT)
Dept: GENERAL RADIOLOGY | Age: 57
DRG: 364 | End: 2022-01-27
Payer: COMMERCIAL

## 2022-01-27 ENCOUNTER — ANESTHESIA (OUTPATIENT)
Dept: OPERATING ROOM | Age: 57
DRG: 364 | End: 2022-01-27
Payer: COMMERCIAL

## 2022-01-27 ENCOUNTER — CARE COORDINATION (OUTPATIENT)
Dept: CARE COORDINATION | Age: 57
End: 2022-01-27

## 2022-01-27 VITALS
SYSTOLIC BLOOD PRESSURE: 102 MMHG | RESPIRATION RATE: 18 BRPM | OXYGEN SATURATION: 81 % | DIASTOLIC BLOOD PRESSURE: 79 MMHG

## 2022-01-27 PROBLEM — L03.115 BILATERAL CELLULITIS OF LOWER LEG: Status: ACTIVE | Noted: 2022-01-27

## 2022-01-27 PROBLEM — L03.116 BILATERAL CELLULITIS OF LOWER LEG: Status: ACTIVE | Noted: 2022-01-27

## 2022-01-27 PROBLEM — F31.60 BIPOLAR DISORDER, CURRENT EPISODE MIXED, UNSPECIFIED (HCC): Status: ACTIVE | Noted: 2021-09-24

## 2022-01-27 LAB
ABSOLUTE EOS #: 0.33 K/UL (ref 0–0.44)
ABSOLUTE IMMATURE GRANULOCYTE: 0.11 K/UL (ref 0–0.3)
ABSOLUTE LYMPH #: 2.83 K/UL (ref 1.1–3.7)
ABSOLUTE MONO #: 1.2 K/UL (ref 0.1–1.2)
ANION GAP SERPL CALCULATED.3IONS-SCNC: 12 MMOL/L (ref 9–17)
BASOPHILS # BLD: 1 % (ref 0–2)
BASOPHILS ABSOLUTE: 0.05 K/UL (ref 0–0.2)
BUN BLDV-MCNC: 16 MG/DL (ref 6–20)
BUN/CREAT BLD: ABNORMAL (ref 9–20)
CALCIUM SERPL-MCNC: 8.7 MG/DL (ref 8.6–10.4)
CHLORIDE BLD-SCNC: 109 MMOL/L (ref 98–107)
CO2: 20 MMOL/L (ref 20–31)
CREAT SERPL-MCNC: 0.97 MG/DL (ref 0.5–0.9)
DIFFERENTIAL TYPE: ABNORMAL
EOSINOPHILS RELATIVE PERCENT: 3 % (ref 1–4)
GFR AFRICAN AMERICAN: >60 ML/MIN
GFR NON-AFRICAN AMERICAN: 60 ML/MIN
GFR SERPL CREATININE-BSD FRML MDRD: ABNORMAL ML/MIN/{1.73_M2}
GFR SERPL CREATININE-BSD FRML MDRD: ABNORMAL ML/MIN/{1.73_M2}
GLUCOSE BLD-MCNC: 346 MG/DL (ref 65–105)
GLUCOSE BLD-MCNC: 452 MG/DL (ref 65–105)
GLUCOSE BLD-MCNC: 54 MG/DL (ref 65–105)
GLUCOSE BLD-MCNC: 60 MG/DL (ref 65–105)
GLUCOSE BLD-MCNC: 61 MG/DL (ref 65–105)
GLUCOSE BLD-MCNC: 73 MG/DL (ref 65–105)
GLUCOSE BLD-MCNC: 75 MG/DL (ref 65–105)
GLUCOSE BLD-MCNC: 83 MG/DL (ref 70–99)
HCT VFR BLD CALC: 25.3 % (ref 36.3–47.1)
HEMOGLOBIN: 7.7 G/DL (ref 11.9–15.1)
IMMATURE GRANULOCYTES: 1 %
LYMPHOCYTES # BLD: 26 % (ref 24–43)
MCH RBC QN AUTO: 30.8 PG (ref 25.2–33.5)
MCHC RBC AUTO-ENTMCNC: 30.4 G/DL (ref 28.4–34.8)
MCV RBC AUTO: 101.2 FL (ref 82.6–102.9)
MONOCYTES # BLD: 11 % (ref 3–12)
NRBC AUTOMATED: 0.5 PER 100 WBC
PDW BLD-RTO: 13.5 % (ref 11.8–14.4)
PLATELET # BLD: 357 K/UL (ref 138–453)
PLATELET ESTIMATE: ABNORMAL
PMV BLD AUTO: 10.4 FL (ref 8.1–13.5)
POTASSIUM SERPL-SCNC: 4.4 MMOL/L (ref 3.7–5.3)
RBC # BLD: 2.5 M/UL (ref 3.95–5.11)
RBC # BLD: ABNORMAL 10*6/UL
SEG NEUTROPHILS: 59 % (ref 36–65)
SEGMENTED NEUTROPHILS ABSOLUTE COUNT: 6.4 K/UL (ref 1.5–8.1)
SODIUM BLD-SCNC: 141 MMOL/L (ref 135–144)
WBC # BLD: 10.9 K/UL (ref 3.5–11.3)
WBC # BLD: ABNORMAL 10*3/UL

## 2022-01-27 PROCEDURE — 2709999900 HC NON-CHARGEABLE SUPPLY: Performed by: PODIATRIST

## 2022-01-27 PROCEDURE — 36415 COLL VENOUS BLD VENIPUNCTURE: CPT

## 2022-01-27 PROCEDURE — 6360000002 HC RX W HCPCS: Performed by: ANESTHESIOLOGY

## 2022-01-27 PROCEDURE — 2700000000 HC OXYGEN THERAPY PER DAY

## 2022-01-27 PROCEDURE — 0J9Q0ZZ DRAINAGE OF RIGHT FOOT SUBCUTANEOUS TISSUE AND FASCIA, OPEN APPROACH: ICD-10-PCS | Performed by: PODIATRIST

## 2022-01-27 PROCEDURE — 87186 SC STD MICRODIL/AGAR DIL: CPT

## 2022-01-27 PROCEDURE — 2580000003 HC RX 258: Performed by: PODIATRIST

## 2022-01-27 PROCEDURE — 6370000000 HC RX 637 (ALT 250 FOR IP): Performed by: STUDENT IN AN ORGANIZED HEALTH CARE EDUCATION/TRAINING PROGRAM

## 2022-01-27 PROCEDURE — 85025 COMPLETE CBC W/AUTO DIFF WBC: CPT

## 2022-01-27 PROCEDURE — 6360000002 HC RX W HCPCS: Performed by: STUDENT IN AN ORGANIZED HEALTH CARE EDUCATION/TRAINING PROGRAM

## 2022-01-27 PROCEDURE — A4217 STERILE WATER/SALINE, 500 ML: HCPCS | Performed by: PODIATRIST

## 2022-01-27 PROCEDURE — 87147 CULTURE TYPE IMMUNOLOGIC: CPT

## 2022-01-27 PROCEDURE — 6370000000 HC RX 637 (ALT 250 FOR IP): Performed by: PODIATRIST

## 2022-01-27 PROCEDURE — 99232 SBSQ HOSP IP/OBS MODERATE 35: CPT | Performed by: INTERNAL MEDICINE

## 2022-01-27 PROCEDURE — 7100000011 HC PHASE II RECOVERY - ADDTL 15 MIN: Performed by: PODIATRIST

## 2022-01-27 PROCEDURE — 87070 CULTURE OTHR SPECIMN AEROBIC: CPT

## 2022-01-27 PROCEDURE — 87076 CULTURE ANAEROBE IDENT EACH: CPT

## 2022-01-27 PROCEDURE — 7100000010 HC PHASE II RECOVERY - FIRST 15 MIN: Performed by: PODIATRIST

## 2022-01-27 PROCEDURE — 0HBMXZZ EXCISION OF RIGHT FOOT SKIN, EXTERNAL APPROACH: ICD-10-PCS | Performed by: PODIATRIST

## 2022-01-27 PROCEDURE — 2500000003 HC RX 250 WO HCPCS: Performed by: PODIATRIST

## 2022-01-27 PROCEDURE — 2500000003 HC RX 250 WO HCPCS: Performed by: STUDENT IN AN ORGANIZED HEALTH CARE EDUCATION/TRAINING PROGRAM

## 2022-01-27 PROCEDURE — 6370000000 HC RX 637 (ALT 250 FOR IP): Performed by: INTERNAL MEDICINE

## 2022-01-27 PROCEDURE — 2500000003 HC RX 250 WO HCPCS: Performed by: ANESTHESIOLOGY

## 2022-01-27 PROCEDURE — 3600000014 HC SURGERY LEVEL 4 ADDTL 15MIN: Performed by: PODIATRIST

## 2022-01-27 PROCEDURE — 3600000004 HC SURGERY LEVEL 4 BASE: Performed by: PODIATRIST

## 2022-01-27 PROCEDURE — 87075 CULTR BACTERIA EXCEPT BLOOD: CPT

## 2022-01-27 PROCEDURE — 1200000000 HC SEMI PRIVATE

## 2022-01-27 PROCEDURE — 80048 BASIC METABOLIC PNL TOTAL CA: CPT

## 2022-01-27 PROCEDURE — 74018 RADEX ABDOMEN 1 VIEW: CPT

## 2022-01-27 PROCEDURE — 94761 N-INVAS EAR/PLS OXIMETRY MLT: CPT

## 2022-01-27 PROCEDURE — 3700000001 HC ADD 15 MINUTES (ANESTHESIA): Performed by: PODIATRIST

## 2022-01-27 PROCEDURE — 86403 PARTICLE AGGLUT ANTBDY SCRN: CPT

## 2022-01-27 PROCEDURE — 82947 ASSAY GLUCOSE BLOOD QUANT: CPT

## 2022-01-27 PROCEDURE — 3700000000 HC ANESTHESIA ATTENDED CARE: Performed by: PODIATRIST

## 2022-01-27 PROCEDURE — 99233 SBSQ HOSP IP/OBS HIGH 50: CPT | Performed by: FAMILY MEDICINE

## 2022-01-27 PROCEDURE — 2580000003 HC RX 258: Performed by: ANESTHESIOLOGY

## 2022-01-27 PROCEDURE — 87205 SMEAR GRAM STAIN: CPT

## 2022-01-27 PROCEDURE — 28003 TREATMENT OF FOOT INFECTION: CPT | Performed by: PODIATRIST

## 2022-01-27 RX ORDER — MEPERIDINE HYDROCHLORIDE 50 MG/ML
12.5 INJECTION INTRAMUSCULAR; INTRAVENOUS; SUBCUTANEOUS EVERY 5 MIN PRN
Status: DISCONTINUED | OUTPATIENT
Start: 2022-01-27 | End: 2022-01-27 | Stop reason: HOSPADM

## 2022-01-27 RX ORDER — MORPHINE SULFATE 2 MG/ML
2 INJECTION, SOLUTION INTRAMUSCULAR; INTRAVENOUS ONCE
Status: COMPLETED | OUTPATIENT
Start: 2022-01-27 | End: 2022-01-27

## 2022-01-27 RX ORDER — SODIUM CHLORIDE 0.9 % (FLUSH) 0.9 %
5-40 SYRINGE (ML) INJECTION PRN
Status: DISCONTINUED | OUTPATIENT
Start: 2022-01-27 | End: 2022-01-27 | Stop reason: HOSPADM

## 2022-01-27 RX ORDER — SODIUM CHLORIDE 9 MG/ML
25 INJECTION, SOLUTION INTRAVENOUS PRN
Status: DISCONTINUED | OUTPATIENT
Start: 2022-01-27 | End: 2022-01-27 | Stop reason: HOSPADM

## 2022-01-27 RX ORDER — MIDAZOLAM HYDROCHLORIDE 2 MG/2ML
1 INJECTION, SOLUTION INTRAMUSCULAR; INTRAVENOUS EVERY 10 MIN PRN
Status: DISCONTINUED | OUTPATIENT
Start: 2022-01-27 | End: 2022-01-27 | Stop reason: HOSPADM

## 2022-01-27 RX ORDER — DIPHENHYDRAMINE HYDROCHLORIDE 50 MG/ML
25 INJECTION INTRAMUSCULAR; INTRAVENOUS EVERY 8 HOURS PRN
Status: DISCONTINUED | OUTPATIENT
Start: 2022-01-27 | End: 2022-02-02

## 2022-01-27 RX ORDER — INSULIN GLARGINE 100 [IU]/ML
22 INJECTION, SOLUTION SUBCUTANEOUS 2 TIMES DAILY
Status: DISCONTINUED | OUTPATIENT
Start: 2022-01-27 | End: 2022-01-28

## 2022-01-27 RX ORDER — SODIUM CHLORIDE, SODIUM LACTATE, POTASSIUM CHLORIDE, CALCIUM CHLORIDE 600; 310; 30; 20 MG/100ML; MG/100ML; MG/100ML; MG/100ML
INJECTION, SOLUTION INTRAVENOUS CONTINUOUS
Status: DISCONTINUED | OUTPATIENT
Start: 2022-01-27 | End: 2022-01-27

## 2022-01-27 RX ORDER — DEXTROSE MONOHYDRATE 25 G/50ML
INJECTION, SOLUTION INTRAVENOUS PRN
Status: DISCONTINUED | OUTPATIENT
Start: 2022-01-27 | End: 2022-01-27 | Stop reason: SDUPTHER

## 2022-01-27 RX ORDER — MAGNESIUM HYDROXIDE 1200 MG/15ML
LIQUID ORAL CONTINUOUS PRN
Status: COMPLETED | OUTPATIENT
Start: 2022-01-27 | End: 2022-01-27

## 2022-01-27 RX ORDER — FENTANYL CITRATE 50 UG/ML
25 INJECTION, SOLUTION INTRAMUSCULAR; INTRAVENOUS EVERY 5 MIN PRN
Status: DISCONTINUED | OUTPATIENT
Start: 2022-01-27 | End: 2022-01-27 | Stop reason: HOSPADM

## 2022-01-27 RX ORDER — DEXAMETHASONE SODIUM PHOSPHATE 10 MG/ML
INJECTION INTRAMUSCULAR; INTRAVENOUS PRN
Status: DISCONTINUED | OUTPATIENT
Start: 2022-01-27 | End: 2022-01-27 | Stop reason: SDUPTHER

## 2022-01-27 RX ORDER — SODIUM CHLORIDE 0.9 % (FLUSH) 0.9 %
5-40 SYRINGE (ML) INJECTION EVERY 12 HOURS SCHEDULED
Status: DISCONTINUED | OUTPATIENT
Start: 2022-01-27 | End: 2022-01-27 | Stop reason: HOSPADM

## 2022-01-27 RX ORDER — PROPOFOL 10 MG/ML
INJECTION, EMULSION INTRAVENOUS PRN
Status: DISCONTINUED | OUTPATIENT
Start: 2022-01-27 | End: 2022-01-27 | Stop reason: SDUPTHER

## 2022-01-27 RX ORDER — MIDAZOLAM HYDROCHLORIDE 1 MG/ML
INJECTION INTRAMUSCULAR; INTRAVENOUS PRN
Status: DISCONTINUED | OUTPATIENT
Start: 2022-01-27 | End: 2022-01-27 | Stop reason: SDUPTHER

## 2022-01-27 RX ORDER — SIMETHICONE 80 MG
80 TABLET,CHEWABLE ORAL EVERY 6 HOURS PRN
Status: DISCONTINUED | OUTPATIENT
Start: 2022-01-27 | End: 2022-02-08 | Stop reason: HOSPADM

## 2022-01-27 RX ORDER — FENTANYL CITRATE 50 UG/ML
INJECTION, SOLUTION INTRAMUSCULAR; INTRAVENOUS PRN
Status: DISCONTINUED | OUTPATIENT
Start: 2022-01-27 | End: 2022-01-27 | Stop reason: SDUPTHER

## 2022-01-27 RX ORDER — LIDOCAINE HYDROCHLORIDE 10 MG/ML
INJECTION, SOLUTION EPIDURAL; INFILTRATION; INTRACAUDAL; PERINEURAL PRN
Status: DISCONTINUED | OUTPATIENT
Start: 2022-01-27 | End: 2022-01-27 | Stop reason: SDUPTHER

## 2022-01-27 RX ORDER — LIDOCAINE HYDROCHLORIDE 10 MG/ML
1 INJECTION, SOLUTION EPIDURAL; INFILTRATION; INTRACAUDAL; PERINEURAL
Status: DISCONTINUED | OUTPATIENT
Start: 2022-01-27 | End: 2022-01-27 | Stop reason: HOSPADM

## 2022-01-27 RX ADMIN — DIPHENHYDRAMINE HYDROCHLORIDE 25 MG: 50 INJECTION, SOLUTION INTRAMUSCULAR; INTRAVENOUS at 20:58

## 2022-01-27 RX ADMIN — DICYCLOMINE HYDROCHLORIDE 10 MG: 10 CAPSULE ORAL at 08:38

## 2022-01-27 RX ADMIN — PREGABALIN 200 MG: 100 CAPSULE ORAL at 20:31

## 2022-01-27 RX ADMIN — OXYCODONE HYDROCHLORIDE 5 MG: 5 TABLET ORAL at 20:32

## 2022-01-27 RX ADMIN — MIRTAZAPINE 7.5 MG: 15 TABLET, FILM COATED ORAL at 20:31

## 2022-01-27 RX ADMIN — INSULIN LISPRO 6 UNITS: 100 INJECTION, SOLUTION INTRAVENOUS; SUBCUTANEOUS at 20:32

## 2022-01-27 RX ADMIN — OXYCODONE HYDROCHLORIDE 5 MG: 5 TABLET ORAL at 04:59

## 2022-01-27 RX ADMIN — DICYCLOMINE HYDROCHLORIDE 10 MG: 10 CAPSULE ORAL at 16:29

## 2022-01-27 RX ADMIN — SODIUM CHLORIDE, POTASSIUM CHLORIDE, SODIUM LACTATE AND CALCIUM CHLORIDE: 600; 310; 30; 20 INJECTION, SOLUTION INTRAVENOUS at 11:53

## 2022-01-27 RX ADMIN — DEXAMETHASONE SODIUM PHOSPHATE 10 MG: 10 INJECTION INTRAMUSCULAR; INTRAVENOUS at 12:08

## 2022-01-27 RX ADMIN — PROPOFOL 30 MG: 10 INJECTION, EMULSION INTRAVENOUS at 12:45

## 2022-01-27 RX ADMIN — PROPOFOL 50 MG: 10 INJECTION, EMULSION INTRAVENOUS at 12:06

## 2022-01-27 RX ADMIN — PROPOFOL 20 MG: 10 INJECTION, EMULSION INTRAVENOUS at 12:32

## 2022-01-27 RX ADMIN — PROPOFOL 20 MG: 10 INJECTION, EMULSION INTRAVENOUS at 12:17

## 2022-01-27 RX ADMIN — PROPOFOL 20 MG: 10 INJECTION, EMULSION INTRAVENOUS at 12:34

## 2022-01-27 RX ADMIN — OXYCODONE HYDROCHLORIDE 5 MG: 5 TABLET ORAL at 16:29

## 2022-01-27 RX ADMIN — MORPHINE SULFATE 2 MG: 2 INJECTION, SOLUTION INTRAMUSCULAR; INTRAVENOUS at 18:02

## 2022-01-27 RX ADMIN — LIDOCAINE HYDROCHLORIDE 50 MG: 10 INJECTION, SOLUTION EPIDURAL; INFILTRATION; INTRACAUDAL; PERINEURAL at 12:06

## 2022-01-27 RX ADMIN — PANTOPRAZOLE SODIUM 20 MG: 40 TABLET, DELAYED RELEASE ORAL at 16:29

## 2022-01-27 RX ADMIN — TRAZODONE HYDROCHLORIDE 150 MG: 100 TABLET ORAL at 20:31

## 2022-01-27 RX ADMIN — CEPHALEXIN 500 MG: 500 CAPSULE ORAL at 05:03

## 2022-01-27 RX ADMIN — DICYCLOMINE HYDROCHLORIDE 10 MG: 10 CAPSULE ORAL at 20:31

## 2022-01-27 RX ADMIN — FENTANYL CITRATE 100 MCG: 50 INJECTION, SOLUTION INTRAMUSCULAR; INTRAVENOUS at 12:06

## 2022-01-27 RX ADMIN — PANTOPRAZOLE SODIUM 20 MG: 40 TABLET, DELAYED RELEASE ORAL at 08:38

## 2022-01-27 RX ADMIN — CLINDAMYCIN IN 5 PERCENT DEXTROSE 600 MG: 12 INJECTION, SOLUTION INTRAVENOUS at 20:31

## 2022-01-27 RX ADMIN — BUDESONIDE AND FORMOTEROL FUMARATE DIHYDRATE 2 PUFF: 80; 4.5 AEROSOL RESPIRATORY (INHALATION) at 08:49

## 2022-01-27 RX ADMIN — INSULIN LISPRO 8 UNITS: 100 INJECTION, SOLUTION INTRAVENOUS; SUBCUTANEOUS at 16:48

## 2022-01-27 RX ADMIN — BUDESONIDE AND FORMOTEROL FUMARATE DIHYDRATE 2 PUFF: 80; 4.5 AEROSOL RESPIRATORY (INHALATION) at 20:32

## 2022-01-27 RX ADMIN — FENTANYL CITRATE 25 MCG: 50 INJECTION, SOLUTION INTRAMUSCULAR; INTRAVENOUS at 13:17

## 2022-01-27 RX ADMIN — FENTANYL CITRATE 25 MCG: 50 INJECTION, SOLUTION INTRAMUSCULAR; INTRAVENOUS at 13:22

## 2022-01-27 RX ADMIN — PREGABALIN 200 MG: 100 CAPSULE ORAL at 08:38

## 2022-01-27 RX ADMIN — INSULIN GLARGINE 22 UNITS: 100 INJECTION, SOLUTION SUBCUTANEOUS at 20:32

## 2022-01-27 RX ADMIN — MIDAZOLAM HYDROCHLORIDE 2 MG: 1 INJECTION, SOLUTION INTRAMUSCULAR; INTRAVENOUS at 12:05

## 2022-01-27 RX ADMIN — PROPOFOL 20 MG: 10 INJECTION, EMULSION INTRAVENOUS at 12:21

## 2022-01-27 RX ADMIN — CEPHALEXIN 500 MG: 500 CAPSULE ORAL at 18:02

## 2022-01-27 RX ADMIN — OXYCODONE HYDROCHLORIDE 5 MG: 5 TABLET ORAL at 08:48

## 2022-01-27 RX ADMIN — DEXTROSE MONOHYDRATE 12.5 G: 25 INJECTION, SOLUTION INTRAVENOUS at 12:53

## 2022-01-27 RX ADMIN — CLINDAMYCIN IN 5 PERCENT DEXTROSE 600 MG: 12 INJECTION, SOLUTION INTRAVENOUS at 05:01

## 2022-01-27 RX ADMIN — VENLAFAXINE HYDROCHLORIDE 150 MG: 150 CAPSULE, EXTENDED RELEASE ORAL at 08:38

## 2022-01-27 ASSESSMENT — ENCOUNTER SYMPTOMS
VOMITING: 0
COLOR CHANGE: 1
CONSTIPATION: 0
DIARRHEA: 0
ABDOMINAL DISTENTION: 0
ABDOMINAL PAIN: 1
SHORTNESS OF BREATH: 0
COLOR CHANGE: 0
EYE DISCHARGE: 0
COUGH: 0
APNEA: 0
NAUSEA: 0
CHEST TIGHTNESS: 0
BACK PAIN: 0

## 2022-01-27 ASSESSMENT — PAIN SCALES - GENERAL
PAINLEVEL_OUTOF10: 10
PAINLEVEL_OUTOF10: 10
PAINLEVEL_OUTOF10: 9
PAINLEVEL_OUTOF10: 10
PAINLEVEL_OUTOF10: 10
PAINLEVEL_OUTOF10: 9
PAINLEVEL_OUTOF10: 10
PAINLEVEL_OUTOF10: 9
PAINLEVEL_OUTOF10: 10
PAINLEVEL_OUTOF10: 10
PAINLEVEL_OUTOF10: 9
PAINLEVEL_OUTOF10: 0
PAINLEVEL_OUTOF10: 10
PAINLEVEL_OUTOF10: 10
PAINLEVEL_OUTOF10: 6
PAINLEVEL_OUTOF10: 9

## 2022-01-27 ASSESSMENT — PULMONARY FUNCTION TESTS
PIF_VALUE: 1
PIF_VALUE: 0
PIF_VALUE: 1
PIF_VALUE: 0
PIF_VALUE: 1
PIF_VALUE: 0
PIF_VALUE: 0
PIF_VALUE: 1
PIF_VALUE: 0
PIF_VALUE: 1
PIF_VALUE: 0
PIF_VALUE: 0
PIF_VALUE: 1

## 2022-01-27 ASSESSMENT — PAIN DESCRIPTION - DESCRIPTORS: DESCRIPTORS: BURNING;ACHING

## 2022-01-27 ASSESSMENT — PAIN DESCRIPTION - PROGRESSION: CLINICAL_PROGRESSION: NOT CHANGED

## 2022-01-27 ASSESSMENT — PAIN DESCRIPTION - ORIENTATION: ORIENTATION: RIGHT;LEFT

## 2022-01-27 ASSESSMENT — PAIN DESCRIPTION - FREQUENCY: FREQUENCY: CONTINUOUS

## 2022-01-27 ASSESSMENT — PAIN DESCRIPTION - PAIN TYPE: TYPE: ACUTE PAIN;CHRONIC PAIN

## 2022-01-27 ASSESSMENT — PAIN DESCRIPTION - LOCATION: LOCATION: FOOT

## 2022-01-27 ASSESSMENT — PAIN DESCRIPTION - ONSET: ONSET: ON-GOING

## 2022-01-27 NOTE — PROGRESS NOTES
[facility]  Progress Note    Date:   1/27/2022  Patient name:  Barry Holter  Date of admission:  1/22/2022  4:43 PM  MRN:   [id number]  YOB: 1965    SUBJECTIVE/Last 24 hours update:     Day 2 Hospitalization:   Patient was seen and examined at the bedside. No acute events overnight. S/P I&D of right foot and left foot debridement. For OR today. Started on keflex for klebsiella UTI. Continue IV clindamycin for cellulitis. CT right done. Pending podiatry recs. Afebrile. VS stable. Patient was complaining of severe pain over night. Given one dose fentanyl. Patient is very anxious today. Denies chills, chest pain, SOB, nausea, vomiting. REVIEW OF SYSTEMS:      Review of Systems   Constitutional: Negative for activity change, appetite change, chills, fatigue and fever. HENT: Negative. Respiratory: Negative for cough, chest tightness and shortness of breath. Cardiovascular: Negative for chest pain, palpitations and leg swelling. Gastrointestinal: Positive for abdominal pain. Negative for constipation, diarrhea, nausea and vomiting. Genitourinary: Positive for dysuria. Negative for decreased urine volume, difficulty urinating, frequency and hematuria. Musculoskeletal: Negative for arthralgias, back pain and neck pain. Skin: Negative for color change. Neurological: Negative for dizziness, weakness, light-headedness, numbness and headaches.        PAST MEDICAL HISTORY:      has a past medical history of Acid reflux, Acute cystitis with hematuria, Acute non-recurrent maxillary sinusitis, Asthma, Bipolar 1 disorder ([address] Bipolar disorder, mixed ([address] BMI 34.0-34.9,adult, Cannabis use disorder, severe, dependence ([address] Cerebrovascular accident (CVA) ([address] Chest pain, Chronic renal insufficiency, Cocaine abuse ([address] COVID-19 virus RNA test result unknown, DDD (degenerative disc disease), cervical, Diabetes mellitus ([facility] [address] Dizziness, Fibromyalgia, History of stroke, Homicidal ideation, Hyperglycemia, Hypertension, Hypotension, IDDM (insulin dependent diabetes mellitus), Lupus ([address] Migraine, Neuropathy, Neuropathy, Polysubstance abuse ([address] Post traumatic stress disorder (PTSD), Posttraumatic stress disorder, Recurrent depression ([address] Recurrent major depressive disorder, in partial remission ([address] Screening mammogram, encounter for, Severe recurrent major depression with psychotic features ([address] Severe recurrent major depression without psychotic features ([address] Stroke (cerebrum) ([address] Stroke ([address] Suicidal ideation, Suicidal intent, Vitamin D deficiency, and White matter changes. PAST SURGICAL HISTORY:      has a past surgical history that includes Abscess Drainage; chest tube insertion; Abdomen surgery; Cataract removal with implant (Bilateral); LASIK (Bilateral); Finger amputation (Left, 03/13/2021); Hand surgery (Right, 09/14/2021); Hand surgery (Right, 09/15/2021); Finger amputation (Right, 10/11/2021); and Finger amputation (Right, 10/11/2021). SOCIALHISTORY:      reports that she has never smoked. She has never used smokeless tobacco. She reports previous alcohol use. She reports current drug use. Drugs: Marijuana (Weed) and Cocaine. FAMILY HISTORY:      family history includes Diabetes in her brother, father, mother, and sister; No Known Problems in her sister; Other in her son; Stroke in her mother. HOME MEDICATIONS:      Prior to Admission medications    Medication Sig Start Date End Date Taking? Authorizing Provider   pregabalin (LYRICA) 200 MG capsule Take 1 capsule by mouth 2 times daily for 30 days.  1/20/22 2/19/22  Lilli Caballero MD   insulin glargine (LANTUS SOLOSTAR) 100 UNIT/ML injection pen Inject 30 Units into the skin 2 times daily 1/20/22   Lilli Caballero MD   doxycycline hyclate (VIBRA-TABS) 100 MG tablet Take 1 tablet by mouth 2 times daily for 7 days 1/20/22 1/27/22 Taylor Garza MD   fluticasone Wash Curly) 50 MCG/ACT nasal spray 1 spray by Each Nostril route daily 12/22/21   Richard Mart MD   acetaminophen (TYLENOL) 500 MG tablet Take 2 tablets by mouth 3 times daily as needed for Pain 12/22/21   Richard Mart MD   dicyclomine (BENTYL) 10 MG capsule Take 1 capsule by mouth 4 times daily 12/17/21   Richard Mart MD   metFORMIN (GLUCOPHAGE) 1000 MG tablet Take 1 tablet by mouth 2 times daily (with meals) 12/17/21   Richard Mart MD   mirtazapine (REMERON) 7.5 MG tablet Take 1 tablet by mouth nightly 12/17/21   Richard Mart MD   pantoprazole (PROTONIX) 20 MG tablet Take 1 tablet by mouth 2 times daily (before meals) 12/17/21   Richard Mart MD   traZODone (DESYREL) 150 MG tablet Take 1 tablet by mouth nightly 12/17/21   Richard Mart MD   venlafaxine (EFFEXOR XR) 150 MG extended release capsule Take 1 capsule by mouth daily (with breakfast) 12/17/21   Richard Mart MD   ibuprofen (ADVIL;MOTRIN) 800 MG tablet Take 1 tablet by mouth every 8 hours as needed for Pain 11/15/21 11/29/21  Karen Mc MD   Lancets MISC 1 each by Does not apply route 3 times daily 11/15/21   Karen Mc MD   Alcohol Swabs 70 % PADS Use 1 swab 3 times daily as needed 11/15/21   Karen Mc MD   blood glucose monitor strips Test 3 times a day & as needed for symptoms of irregular blood glucose. Dispense sufficient amount for indicated testing frequency plus additional to accommodate PRN testing needs.  11/8/21   Shirin Franks MD   Lancets MISC 1 each by Does not apply route 3 times daily 11/8/21   Shirin Franks MD   Insulin Pen Needle ([employer] PEN NEEDLES 31G) 31G X 8 MM MISC 1 each by Does not apply route daily 11/8/21   Jessica Davis MD   FREESTYLE LITE strip 1 each by Does not apply route 3 times daily 11/11/20   Richard Mart MD   albuterol sulfate  (90 Base) MCG/ACT inhaler Inhale 2 puffs into the lungs every 4 hours as needed for Wheezing 10/20/20 9/22/21  Esrtella Addison MD   FLOVENT  MCG/ACT inhaler Inhale 2 puffs into the lungs 2 times daily 10/20/20   Estrella Addison MD   budesonide-formoterol (SYMBICORT) 80-4.5 MCG/ACT AERO Inhale 2 puffs into the lungs 2 times daily 10/20/20   Estrella Addison MD       ALLERGIES:     Bactrim [sulfamethoxazole-trimethoprim] and Adhesive tape    OBJECTIVE:       Vitals:    01/25/22 1944 01/26/22 0600 01/26/22 0707 01/26/22 2027   BP: (!) 149/86  (!) 89/53 122/63   Pulse: 101  84 98   Resp: 16  18 17   Temp: 99.7 °F (37.6 °C)  97.8 °F (36.6 °C) 101.1 °F (38.4 °C)   TempSrc: Oral  Oral Oral   SpO2: 95%  95%    Weight:  251 lb 15.8 oz (114.3 kg)     Height:           No intake or output data in the 24 hours ending 01/27/22 0701    PHYSICAL EXAM:    Physical Exam  Vitals and nursing note reviewed. Constitutional:       General: She is not in acute distress. Appearance: Normal appearance. She is not ill-appearing. HENT:      Head: Normocephalic and atraumatic. Mouth/Throat:      Pharynx: Oropharynx is clear. Cardiovascular:      Rate and Rhythm: Normal rate and regular rhythm. Pulses: Normal pulses. Heart sounds: No murmur heard. Pulmonary:      Effort: Pulmonary effort is normal.      Breath sounds: Normal breath sounds. Abdominal:      Palpations: Abdomen is soft. There is no mass. Tenderness: There is no abdominal tenderness. Musculoskeletal:         General: No tenderness. Normal range of motion. Cervical back: Normal range of motion and neck supple. Right lower leg: Edema present. Left lower leg: Edema present. Neurological:      General: No focal deficit present. Mental Status: She is alert and oriented to person, place, and time.          ASSESSMENT:      Active Problems:    Type 2 diabetes mellitus without complication, with long-term current use of insulin (HCC)    Right foot infection    Cellulitis and abscess of toe of right foot  Resolved Problems:    * No resolved hospital problems. *      PLAN:        RLE foot wound/infection  - Tmax 100.4  - leukocytosis, elevated CRP  - X-ray revealed no foreign body in foot. However moderate right foot swelling.  - S/P I&D of right foot and left foot debridement. - on IV clindamycin and cefepime  - pain control  -  ml/hr  - podiatry consulted, follow up recommendations  -ID consulted- appreciate recs.   - elevated D-dime  -Venous doppler shows chronic DVT  -Continue DVT prophylaxis, lovenos      UTI   Cx: klebsiella  S/P Cefepime  On keflex till 1/29    DM2  - A1C 10  -POCT glucose  -on lantus 22U BID  - medium dose ISS  - hypoglycemia protocol     Bipolar disorder  - stable  - continue remeron 7.5 mg PO nightly  - effexor  mg po daily  - trazadone 150 mg PO nightly    Plan will be discussed with the attending, Leora Mireles MD  Family Medicine Resident  1/27/2022 7:01 AM

## 2022-01-27 NOTE — PROGRESS NOTES
[facility] -   Infectious diseases evaluation  admission date 1/22/2022    reason for consultation:   Diabetic foot infection    Impression :   Current:  · Right diabetic foot infection, GAS  · Cellulitis both feet  · bandemia  · Bacteriuria vs UTI kleb S ancef    Other:  ·   Discussion / summary of stay / plan of care   ·   Recommendations   · clindamycin iv  · Elevate feet and see if cellulitis resolves  · I/D right foot abscess 1/27  · Wound Cx 1/23: Strep Pyogenes  · UC 1/24 : Klebsiella- no dysuria - but lower abd pain - on keflex till 1/29  ·     Infection Control Recommendations   · [place] Precautions  · Contact Isolation       Antimicrobial Stewardship Recommendations   · Simplification of therapy  · Targeted therapy  Coordination ofOutpatient Care:   · Estimated Length of IV antimicrobials:  · Patient will need Midline / picc Catheter Insertion:   · Patient will need SNF:  · Patient will need outpatient wound care:     History of Present Illness:   Initial history:  Christ Posadas is a 49y.o.-year-old female with diabetes very well-known to my service, stepped on a piece of glass a few days prior to admission presented with swelling and drainage from right foot, culture came back with group A strep,  Patient has neuropathy from diabetes  WBC elevated 14,  sed [id number],     Podiatry debrided the superficial ulcer and felt it was not too deep yet there was cellulitis of both feet. Hemoglobin A1c was 10    MRI of the left foot no osteomyelitis  MRI of the right foot no osteomyelitis    Leukocytosis responded to antibiotics, infectious consulted for antibiotic management  Patient is on cefepime and clindamycin            R foot very swollen and tender to light touch - suspect deeper involvement and needs more I/D    1/26  Pt is afebrile and stable this morning. WBC trending up (14 today). UC 1/24 growing Klebsiella.    CT right leg 1/25 shows an abscess  Planned for I/D on 1/27  Pain still ++    1/27  Pt seen and examined at bedside, afebrile and hypotensive this morning. Endorsed a lot of pain overnight. WBC improved to 11   To go to OR today for I+D of R foot abscess. Started on Keflex until 1/29 for UTI  Will monitor on antibiotics and await podiatry recs      Interval changes  1/27/2022   Patient Vitals for the past 8 hrs:   BP Temp Temp src Pulse Resp SpO2   01/27/22 0738 (!) 88/49 98.8 °F (37.1 °C) Oral 87 16 97 %       Summary of relevant labs:  Labs:  W14 > 11  [id number]    Micro:  cx foot pus GAS  Uc 1/24: Klebsiella    Imaging:  CT R foot 1/25:  1. Shallow soft tissue ulceration plantar and medial to the 1st metatarsal   base with an underlying area of possible abscess versus phlegmon measuring   2.8 x 1.1 x 0.9 cm.   2. Extensive subcutaneous fat stranding compatible with cellulitis.  No soft   tissue gas. 3. No evidence of osteomyelitis or other acute osseous abnormality. U/S renal 1/25: Unremarkable    MRI R foot 1/23:  Subcutaneous edema and swelling, most notably affecting the dorsum of the   foot, which may reflect underlying cellulitis.  No discrete organized fluid   collection is seen within the limitations of a noncontrast study.       No imaging features of acute osteomyelitis appreciated. MRI L foot: 1/23:   Limited evaluation due to patient motion artifact on several sequences.       Skin thickening and subcutaneous edema seen within the plantar soft tissues   of the foot at the level of the proximal metatarsals, which may reflect   underlying cellulitis.  Within the limitations of a noncontrast study, no   discrete organized fluid collection is seen.       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            I have personally reviewed the past medical history, past surgical history, medications, social history, and family history, and I haveupdated the database accordingly. Allergies:   Bactrim [sulfamethoxazole-trimethoprim] and Adhesive tape     Review of Systems:     Review of Systems   Constitutional: Negative for activity change. HENT: Negative for congestion. Eyes: Negative for discharge. Respiratory: Negative for apnea. Cardiovascular: Negative for chest pain. Gastrointestinal: Negative for abdominal distention. Endocrine: Negative for cold intolerance. Genitourinary: Negative for dysuria. Musculoskeletal: Negative for arthralgias. Skin: Positive for color change and wound. Allergic/Immunologic: Negative for immunocompromised state. Neurological: Negative for dizziness, seizures, light-headedness, numbness and headaches. Hematological: Negative for adenopathy. Psychiatric/Behavioral: Negative for agitation. Physical Examination :       Physical Exam  Constitutional:       General: She is not in acute distress. Appearance: Normal appearance. She is not ill-appearing, toxic-appearing or diaphoretic. HENT:      Head: Normocephalic and atraumatic. Nose: Nose normal. No congestion. Eyes:      General: No scleral icterus. Pupils: Pupils are equal, round, and reactive to light. Cardiovascular:      Rate and Rhythm: Normal rate and regular rhythm. Heart sounds: Normal heart sounds. No murmur heard. Pulmonary:      Effort: No respiratory distress. Breath sounds: Normal breath sounds. Abdominal:      General: There is no distension. Palpations: Abdomen is soft. Genitourinary:     Comments: Urine fatuma  Musculoskeletal:         General: Swelling present. No tenderness. Cervical back: Neck supple. No rigidity or tenderness. Skin:     General: Skin is dry. Coloration: Skin is not jaundiced. Findings: Erythema present. Neurological:      Mental Status: She is alert and oriented to person, place, and time. Mental status is at baseline.    Psychiatric: Mood and Affect: Mood normal.         Thought Content:  Thought content normal.         Past Medical History:     Past Medical History:   Diagnosis Date    Acid reflux 5/29/2017    Acute cystitis with hematuria 10/11/2016    Acute non-recurrent maxillary sinusitis 11/28/2017    Asthma     Bipolar 1 disorder ([address] 1/4/2018    Bipolar disorder, mixed ([address]     BMI 34.0-34.9,adult 11/28/2017    Cannabis use disorder, severe, dependence ([address] 12/19/2017    Cerebrovascular accident (CVA) ([address] 6/14/2017    Chest pain 11/5/2016    Chronic renal insufficiency     Cocaine abuse ([address] 1/5/2018    COVID-19 virus RNA test result unknown     DDD (degenerative disc disease), cervical     Diabetes mellitus ([address]     Dizziness 6/13/2017    Fibromyalgia     History of stroke 9/9/2017    Homicidal ideation 11/6/2017    Hyperglycemia     Hypertension     Hypotension 1/18/2019    IDDM (insulin dependent diabetes mellitus) 12/21/2015    Lupus ([address]     Migraine     Neuropathy     Neuropathy     Polysubstance abuse ([address] 9/17/2017    Post traumatic stress disorder (PTSD)     Posttraumatic stress disorder 5/29/2017    Recurrent depression ([address] 5/29/2017    Recurrent major depressive disorder, in partial remission ([address] 11/28/2017    Screening mammogram, encounter for 11/28/2017    Severe recurrent major depression with psychotic features ([address] 12/19/2017    Severe recurrent major depression without psychotic features ([address] 12/19/2017    Stroke (cerebrum) ([address] 6/14/2017    Stroke ([address]     per chart notes    Suicidal ideation     Suicidal intent 3/10/2017    Vitamin D deficiency 11/28/2017    White matter changes 6/13/2017       Past Surgical  History:     Past Surgical History:   Procedure Laterality Date    ABDOMEN SURGERY      drain tube    ABSCESS DRAINAGE      right buttock    CATARACT REMOVAL WITH IMPLANT Bilateral     CHEST TUBE INSERTION      FINGER AMPUTATION Left 03/13/2021    LEFT RING FINER AMPUTATION performed [facility] Gladys Ledezma [facility] Right 10/11/2021    AMPUTATION RIGHT INDEX FINGER    FINGER AMPUTATION Right 10/11/2021    AMPUTATION RIGHT INDEX FINGER performed by Gladys [facility]zma MD at [facility] Right 09/14/2021    I&D INDEX FINGER [facility] 09/15/[facility]21    I&D INDEX FINGER performed by Gladys Ledezma MD at [address] [place] [address] Bilateral        Medications:      insulin glargine  22 Units SubCUTAneous BID    cephALEXin  500 mg Oral 4 times per day    enoxaparin  30 mg SubCUTAneous BID    lidocaine PF  5 mL IntraDERmal Once    clindamycin (CLEOCIN) IV  600 mg IntraVENous Q8H    budesonide-formoterol  2 puff Inhalation BID    dicyclomine  10 mg Oral 4x Daily    fluticasone  1 spray Each Nostril Daily    mirtazapine  7.5 mg Oral Nightly    pantoprazole  20 mg Oral BID AC    pregabalin  200 mg Oral BID    traZODone  150 mg Oral Nightly    venlafaxine  150 mg Oral Daily with breakfast    sodium chloride flush  5-40 mL IntraVENous 2 times per day    insulin lispro  0-12 Units SubCUTAneous TID WC    insulin lispro  0-6 Units SubCUTAneous Nightly       Social History:     Social History     Socioeconomic History    Marital status: Legally      Spouse name: Not on file    Number of children: Not on file    Years of education: Not on file    Highest education level: Not on file   Occupational History    Not on file   Tobacco Use    Smoking status: Never Smoker    Smokeless tobacco: Never Used   Vaping Use    Vaping Use: Never used   Substance and Sexual Activity    Alcohol use: Not Currently    Drug use: Yes     Types: Marijuana (Tisha Coreas), Cocaine     Comment: + Cocaine 2/2021 also, see Care Everywhere UDS    Sexual activity: Not Currently     Partners: Male   Other Topics Concern    Not on file   Social History Narrative    Not on file     Social Determinants of Health     Financial Resource Strain: High Risk    Difficulty of Paying Living Expenses: Hard   Food Insecurity: Food Insecurity Present    Worried About Running Out of Food in the Last Year: Often true    Kelin of Food in the Last Year: Often true   Transportation Needs: Unmet Transportation Needs    Lack of Transportation (Medical):  Yes    Lack of Transportation (Non-Medical): Yes   Physical Activity: Inactive    Days of Exercise per Week: 0 days    Minutes of Exercise per Session: 0 min   Stress: Stress Concern Present    Feeling of Stress : Rather much   Social Connections:     Frequency of Communication with Friends and Family: Not on file    Frequency of Social Gatherings with Friends and Family: Not on file    Attends [employer] Services: Not on file    Active Member of Clubs or Organizations: Not on file    Attends Club or Organization Meetings: Not on file    Marital Status: Not on file   Intimate Partner Violence:     Fear of Current or Ex-Partner: Not on file    Emotionally Abused: Not on file    Physically Abused: Not on file    Sexually Abused: Not on file   Housing Stability: High Risk    Unable to Pay for Housing in the Last Year: Yes    Number of Places Lived in the Last Year: 2    Unstable Housing in the Last Year: Yes       Family History:     Family History   Problem Relation Age of Onset    Diabetes Mother     Stroke Mother     Diabetes Father     Diabetes Sister     Diabetes Brother     Other Son         GSW    No Known Problems Sister       Medical Decision Making:   I have independently reviewed/ordered the following labs:    CBC with Differential:   Recent Labs     01/26/22  0404 01/27/22  0619   WBC 13.9* 10.9   HGB 9.3* 7.7*   HCT 30.2* 25.3*    357   LYMPHOPCT 34 26   MONOPCT 12 11     BMP:  Recent Labs     01/26/22  0404 01/27/22  0619   * 141   K 3.6* 4.4    109*   CO2 19* 20   BUN 13 16   CREATININE 0.94* 0.97*     Hepatic Function Panel:   No results for input(s): PROT, LABALBU, BILIDIR, IBILI, BILITOT, ALKPHOS, ALT, AST in the last 72 hours. No results for input(s): RPR in the last 72 hours. No results for input(s): HIV in the last 72 hours. No results for input(s): BC in the last 72 hours. Lab Results   Component Value Date    CREATININE 0.97 01/27/2022    GLUCOSE 83 01/27/2022       Detailed results: Thank you for allowing us to participate in the care of this patient. Please call with questions. This note is created with the assistance of a speech recognition program.  While intending to generate adocument that actually reflects the content of the visit, the document can still have some errors including those of syntax and sound a like substitutions which may escape proof reading. It such instances, actual meaningcan be extrapolated by contextual diversion. Gavi Groves MS4 on behalf of Dr. Gal Peck             I have discussed the care of the patient, including pertinent history and exam findings,  with the resident. I have seen and examined the patient and the key elements of all parts of the encounter have been performed by me. I agree with the assessment, plan and orders as documented by the resident.     Maylin Puga, Infectious Diseases

## 2022-01-27 NOTE — PROGRESS NOTES
Physical Therapy        Physical Therapy Cancel Note      DATE: 2022    NAME: Bruce Meneses  MRN: [id number]   : 1965      Patient not seen this date for Physical Therapy due to:    Surgery/Procedure: Pt out of room for procedure, will check back tomorrow.       Electronically signed by Nay Bell PTA on 2022 at 11:39 AM

## 2022-01-27 NOTE — BRIEF OP NOTE
PODIATRY BRIEF OP NOTE    PATIENT NAME: Venus Neely  YOB: 1965  -  49 y.o. female  MRN: [id number]  DATE: 1/27/2022  BILLING #: [id number]    Surgeon(s):  Lee Ann Aviles DPM     ASSISTANTS: Enrike Mosley DPM PGY-1    PRE-OP DIAGNOSIS:   1. Abscess, right foot  2. Cellulitis, right foot  3. Uncontrolled type DM2 with peripheral neuropathy    POST-OP DIAGNOSIS: Same as above. PROCEDURE:   1. Incision and drainage, right foot  2. Debridement of non-viable tissue and bone, right foot    ANESTHESIA: MAC    HEMOSTASIS: Pneumatic ankle tourniquet @ 250 mmHg for 20 minutes. ESTIMATED BLOOD LOSS: Less than 10cc. MATERIALS:   * No implants in log *    INJECTABLES: 1:1 mix of 0.5% marcaine plain and 1% lidocaine plain    SPECIMEN:   ID Type Source Tests Collected by Time Destination   1 : Right Foot  Abscess Swab Swab Foot [facility]644        COMPLICATIONS: none    FINDINGS: 3cc of abscess was drained through the incision. Induration noted around wound. Skin was closed. The patient was counseled at length about the risks of cm Covid-19 during their perioperative period and any recovery window from their procedure. The patient was made aware that cm Covid-19  may worsen their prognosis for recovering from their procedure  and lend to a higher morbidity and/or mortality risk. All material risks, benefits, and reasonable alternatives including postponing the procedure were discussed. The patient does wish to proceed with the procedure at this time.     Enrike Mosley DPM   Podiatric Medicine & Surgery   1/27/2022 at 12:56 PM

## 2022-01-27 NOTE — PROGRESS NOTES
Progress Note  Podiatric Medicine and Surgery     Subjective     CC: right foot pain    Patient seen and examined at bedside. OR today at noon. NPO since midnight      HPI :  Venus Neely is a 49 y.o. female seen at [facility] for right foot pain. Patient stepped on a piece of glass a few days ago. She went to her PCP when PCP removed the glass. She presented to the ED last night due to increased pain and swelling to the right lower extremity. Upon arrival, X-ray was taken which showed no foreign body, acute osseous deformity or soft tissue emphysema. During my evaluation, patient also mentioned pain on her plantar left foot and  pain is not as bad as her right foot. Per chart review, patient visited [facility] podiatry clinic a year ago for diabetic foot care but never follows up after that. 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 Her last A1C in 12/13/2021 is 13.3%. She admits neuropathic pain to bilateral lower extremity. She denies other pedal complaints. ROS: Denies N/V/F/C/SOB/CP. Otherwise negative except at stated in the HPI.      Medications:  Scheduled Meds:   [MAR Hold] insulin glargine  22 Units SubCUTAneous BID    sodium chloride flush  5-40 mL IntraVENous 2 times per day    [MAR Hold] cephALEXin  500 mg Oral 4 times per day    [MAR Hold] enoxaparin  30 mg SubCUTAneous BID    [MAR Hold] lidocaine PF  5 mL IntraDERmal Once    [MAR Hold] clindamycin (CLEOCIN) IV  600 mg IntraVENous Q8H    [MAR Hold] budesonide-formoterol  2 puff Inhalation BID    [MAR Hold] dicyclomine  10 mg Oral 4x Daily    [MAR Hold] fluticasone  1 spray Each Nostril Daily    [MAR Hold] mirtazapine  7.5 mg Oral Nightly    [MAR Hold] pantoprazole  20 mg Oral BID AC    [MAR Hold] pregabalin  200 mg Oral BID    [MAR Hold] traZODone  150 mg Oral Nightly    [MAR Hold] venlafaxine  150 mg Oral Daily with breakfast    [MAR Hold] sodium chloride flush  5-40 mL IntraVENous 2 times per day    [facility] Hold] insulin lispro  0-12 Units SubCUTAneous TID WC    [MAR Hold] insulin lispro  0-6 Units SubCUTAneous Nightly       Continuous Infusions:   sodium chloride      lactated ringers 125 mL/hr at 22 1153    sodium chloride      [MAR Hold] sodium chloride      [MAR Hold] sodium chloride 100 mL/hr at 22 1153    [MAR Hold] dextrose         PRN Meds:sodium chloride, fentanNYL, sodium chloride flush, sodium chloride, lidocaine PF, midazolam, Local Anesthetic Custom Mixture, meperidine, fentanNYL, [MAR Hold] albuterol sulfate HFA, [MAR Hold] oxyCODONE, [MAR Hold] sodium chloride flush, [MAR Hold] sodium chloride, [MAR Hold] ondansetron **OR** [MAR Hold] ondansetron, [MAR Hold] polyethylene glycol, [MAR Hold] acetaminophen **OR** [MAR Hold] acetaminophen, [MAR Hold] potassium chloride **OR** [MAR Hold] potassium alternative oral replacement **OR** [MAR Hold] potassium chloride, [MAR Hold] glucose, [MAR Hold] dextrose, [MAR Hold] glucagon (rDNA), [MAR Hold] dextrose    Objective     Vitals:  Patient Vitals for the past 8 hrs:   BP Temp Temp src Pulse Resp SpO2   22 1058 130/79 97.5 °F (36.4 °C) Temporal 93 14 97 %   22 0738 (!) 88/49 98.8 °F (37.1 °C) Oral 87 16 97 %     Average, Min, and Max for last 24 hours Vitals:  TEMPERATURE:  Temp  Av.1 °F (37.3 °C)  Min: 97.5 °F (36.4 °C)  Max: 101.1 °F (38.4 °C)    RESPIRATIONS RANGE: Resp  Av.2  Min: 0  Max: 20    PULSE RANGE: Pulse  Av.7  Min: 87  Max: 98    BLOOD PRESSURE RANGE:  Systolic (73HZO), GRZ:113 , Min:88 , SVH:933   ; Diastolic (85HSA), ZRQ:20, Min:49, Max:95      PULSE OXIMETRY RANGE: SpO2  Av.7 %  Min: 81 %  Max: 100 %    I/O last 3 completed shifts:  In: -   Out: 900 [Urine:900]    CBC:  Recent Labs     22  0521 22  0404 22  0619   WBC 12.5* 13.9* 10.9   HGB 8.0* 9.3* 7.7*   HCT 26.3* 30.2* 25.3*    391 357        BMP:  Recent Labs     22  0521 22  0404 22  0619    133* 141 K 4.0 3.6* 4.4    101 109*   CO2 21 19* 20   BUN 17 13 16   CREATININE 0.77 0.94* 0.97*   GLUCOSE 148* 64* 83   CALCIUM 8.6 8.8 8.7        Coags:  No results for input(s): APTT, PROT, INR in the last 72 hours. Lab Results   Component Value Date    SEDRATE 124 (H) 01/23/2022     No results for input(s): CRP in the last 72 hours. Lower Extremity Physical Exam: dressing left intact due to surgery exam from 1/26/22  Vascular: DP and PT pulses are palpable. CFT <3 seconds to all digits. Hair growth is absent to the level of the digits.        Neuro: Saph/sural/SP/DP/plantar sensation diminished to light touch.     Musculoskeletal: Muscle strength is 5/5 to all lower extremity muscle groups. Gross deformity is status post left 4th and 5th digit amputation. Tenderness to palpation to previous entry point of injury on the right foot and hyperkeratotic tissue on the left foot.      Dermatologic:     Diffuse edema noted throughout the right lower extremity with moderate erythema and increased warmth - improved     Right foot: full thickness ulcer #1 located right medial arch and measures approximately 2cm x 1cm x 0.4cm. Base is fibrotic. Sloughing skin noted to drained abscess noted above. Purulent drainage noted with no associated mal odor. Does not probe to bone but probe deep to subcutaneous layer. Tracking noted laterally  Define abscess noted around wound. Left foot: Full thickness ulcer #2 located lateral plantar left foot and measures approximately 1cmx 1cmx0.2cm. Base is fibrotic. Small amount of purulent drainage noted with no associated mal odor. Does not probe to bone, sinus track, or undermine. No fluctuance, crepitus, or induration. Interdigital maceration absent. Clinical Images:                          Imaging:   XR ABDOMEN (KUB) (SINGLE AP VIEW)   Preliminary Result   Increased stool burden seen diffusely throughout the colon suggesting   clinical presentation of constipation. Phleboliths seen on both sides of the   pelvis. CT FOOT RIGHT W CONTRAST   Final Result   1. Shallow soft tissue ulceration plantar and medial to the 1st metatarsal   base with an underlying area of possible abscess versus phlegmon measuring   2.8 x 1.1 x 0.9 cm.   2. Extensive subcutaneous fat stranding compatible with cellulitis. No soft   tissue gas. 3. No evidence of osteomyelitis or other acute osseous abnormality. US RENAL COMPLETE   Final Result   Unremarkable renal ultrasound. MRI FOOT RIGHT WO CONTRAST   Final Result   Subcutaneous edema and swelling, most notably affecting the dorsum of the   foot, which may reflect underlying cellulitis. No discrete organized fluid   collection is seen within the limitations of a noncontrast study. No imaging features of acute osteomyelitis appreciated. MRI FOOT LEFT WO CONTRAST   Final Result   Limited evaluation due to patient motion artifact on several sequences. Skin thickening and subcutaneous edema seen within the plantar soft tissues   of the foot at the level of the proximal metatarsals, which may reflect   underlying cellulitis. Within the limitations of a noncontrast study, no   discrete organized fluid collection is seen. 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 VL DUP LOWER EXTREMITY VENOUS BILATERAL   Final Result      XR FOOT RIGHT (MIN 3 VIEWS)   Final Result   Right foot: No radiopaque foreign body. No fracture. Moderate soft tissue   swelling within the anterior aspect of plantar aspect of the foot   predominantly underlying meta tarsal heads. Left foot: Changes related to prior partial amputation of 4th and 5th ray as   described. No acute fracture. No radiopaque foreign body. Moderate soft   tissue swelling and plantar aspect of the foot. No discrete soft tissue ulcer   is noted.          XR FOOT LEFT (MIN 3 VIEWS)   Final Result   Right foot: No radiopaque foreign body. No fracture. Moderate soft tissue   swelling within the anterior aspect of plantar aspect of the foot   predominantly underlying meta tarsal heads. Left foot: Changes related to prior partial amputation of 4th and 5th ray as   described. No acute fracture. No radiopaque foreign body. Moderate soft   tissue swelling and plantar aspect of the foot. No discrete soft tissue ulcer   is noted. Cultures: Group A strep. anaerobe contaminated. Micky Christopher is a 49 y.o. female with   1. Diabetic foot ulcer to subcutaneous layer, bilateral feet  2. Cellulitis, bilateral feet  3. Superficial abscess, bilateral feet  4. Poorly uncontrolled DM2 with peripheral neuropathy. 5. S/p bedside I&D right foot    Active Problems:    Type 2 diabetes mellitus without complication, with long-term current use of insulin ([facility])    Right foot infection    Cellulitis and abscess of toe of right foot    Abscess of right foot  Resolved Problems:    * No resolved hospital problems. *       Plan     · Patient examined and evaluated at bedside   · Treatment options discussed in detail with the patient. · Antibiotic: Clindamycin and cefipime per ID. Appreciate recommendation   · Medical management per primary. · MRI bilateral feet: no definite deep abscess or osteomyelitis. · DVT scan: chronic DVT to RLE. · Dressing applied to right foot: iodoform packing, dsd, ace  · CT scan on 1/25 shows possible abscess vs. Phlegmon  · Plan OR at 12. · Consent signed  · Foot marked  · NPO at midnight  · Morning AC held  · Patient is COVID vaccinated  · Dressing remained intact to left foot: betadine, dsd, ace  · Non weight bearing to right foot.  WBAT to left foot  · Discussed with Dr. Matthew Morgan DPM   Podiatric Medicine & Surgery   1/27/2022 at 1:02 PM

## 2022-01-27 NOTE — PROGRESS NOTES
Physician Progress Note      Gena Cole  [facility] #:                  [id number]  :                       1965  ADMIT DATE:       2022 4:43 PM  DISCH DATE:  RESPONDING  PROVIDER #:        [place]          QUERY TEXT:    Pt admitted with bilateral LE cellulitis and bilateral LE foot ulcers. Pt   noted to have uncontrolled DM2. If possible, please document  the   relationship, if any, between cellulitis and DM. The medical record reflects the following:  Risk Factors: Uncontrolled DM2  Clinical Indicators: Hgb A1C 10 on 22. Serum glucose this admission    with POC glucose . Podiatry Consult note: Per chart review,   patient visited [facility] podiatry clinic a year ago for diabetic foot care but   never follows up after that. She is status post left foot 4th and 5th digits   amputation. Her last A1C in 2021 is 13.3% Poorly uncontrolled DM2 with   peripheral neuropathy. Treatment: Podiatry consult, Debridement per Podiatry, IV Cleocin, Lantus and   Humalog SS. Labs/monitoring    Thank-you,  Sal Ng RN, CDS  [email or web address]  Options provided:  -- Bilateral LE cellulitis and bilateral foot ulcers associated with Diabetes  -- Bilateral LE cellulitis and bilateral foot ulcers unrelated to Diabetes  -- Other - I will add my own diagnosis  -- Disagree - Not applicable / Not valid  -- Disagree - Clinically unable to determine / Unknown  -- Refer to Clinical Documentation Reviewer    PROVIDER RESPONSE TEXT:    Bilateral LE cellulitis and bilateral foot ulcers associated with uncontrolled   Diabetes.       Query created by: Yanet Corbin on 2022 6:12 AM      Electronically signed by:  [place] 2022 6:36 PM

## 2022-01-27 NOTE — CARE COORDINATION
[facility] phoned the patient to check in on her while in the hospital.  Patient expressed that she had surgery today on her feet and    was willing to talk to patient at another time. Patient sounded  alert, and spoke about not wanting to go to a facility, believing that  the response times for assistance will be worse than the hospital.  Patient reported that she had to wait two hours for assistance to   the bathroom. She's very concerned about her apartment, [facility].   suggested that the patient rest today and maybe make some contacts   on tomorrow. She became very emotional and was crying out to God  to help her. [facility]. encouraged patient to rest for the remainder of the  day.     Plan of Care  [facility] will follow up with patient tomorrow as well as VALERIA

## 2022-01-27 NOTE — ADT AUTH CERT
Clinical Images by Andrés Chaparro RN       Review Status Review Entered   In Primary 1/27/2022 13:17      Criteria Review   Clinical Images:                      Musculoskeletal Disease GRG - Care Day 6 (1/27/2022) by Andrés Chaparro RN       Review Status Review Entered   Completed 1/27/2022 13:13      Criteria Review      Care Day: 6 Care Date: 1/27/2022 Level of Care:    Guideline Day 2    Level Of Care    (X) Floor    1/27/2022 1:13 PM EST by Nilo Howell      MS  weight restrictions  apply ice    Clinical Status    ( ) * No ICU or intermediate care needs    Interventions    (X) Inpatient interventions continue    1/27/2022 1:13 PM EST by Caitlyn Carranza      Cleocin 600 mg q 8 hr IV   ml/hr IV  Oxycodone 5 mg po prn x 2  OR today    * Milestone   Additional Notes   DATE: 1/27/2022         Pertinent Updates:   S/P I&D of right foot and left foot debridement. For OR today. Started on keflex for klebsiella UTI. Continue IV clindamycin for cellulitis. CT right done. Pending podiatry recs. Afebrile. VS stable. Patient was complaining of severe pain over night. Given one dose fentanyl. Patient is very anxious today. Vitals: Last night temp was 101.1  Today Temp 98.8 16-87 88/49           Abnl/Pertinent Labs:      Chloride: 109 (H)   Creatinine: 0.97 (H)   GFR Non-: 60 (L)   RBC: 2.50 (L)   Hemoglobin Quant: 7.7 (L)   Hematocrit: 25.3 (L)   Immature Granulocytes: 1 (H)   Absolute Immature Granulocyte: 0.11   NRBC Automated: 0.5 (H)         Physical Exam:   Lower Extremity Physical Exam: dressing left intact due to surgery exam from 1/26/22   Vascular: DP and PT pulses are palpable. CFT <3 seconds to all digits.  Hair growth is absent to the level of the digits.          Neuro: Saph/sural/SP/DP/plantar sensation diminished to light touch.       Musculoskeletal: Muscle strength is 5/5 to all lower extremity muscle groups. Gross deformity is status post left 4th and 5th digit amputation. Tenderness to palpation to previous entry point of injury on the right foot and hyperkeratotic tissue on the left foot.        Dermatologic:        Diffuse edema noted throughout the right lower extremity with moderate erythema and increased warmth - improved        Right foot: full thickness ulcer #1 located right medial arch and measures approximately 2cm x 1cm x 0.4cm. Base is fibrotic. Sloughing skin noted to drained abscess noted above.  Purulent drainage noted with no associated mal odor.  Does not probe to bone but probe deep to subcutaneous layer. Tracking noted laterally  Define abscess noted around wound.        Left foot: Full thickness ulcer #2 located lateral plantar left foot and measures approximately 1cmx 1cmx0.2cm. Base is fibrotic.   Small amount of purulent drainage noted with no associated mal odor. Does not probe to bone, sinus track, or undermine. No fluctuance, crepitus, or induration.   Interdigital maceration absent.          MD Consults/Assessments & Plans:      Family Medicine       PLAN:           RLE foot wound/infection   - Tmax 100.4   - leukocytosis, elevated CRP   - X-ray revealed no foreign body in foot. However moderate right foot swelling.   - S/P I&D of right foot and left foot debridement. - on IV clindamycin and cefepime   - pain control   -  ml/hr   - podiatry consulted, follow up recommendations   -ID consulted- appreciate recs. - elevated D-dime   -Venous doppler shows chronic DVT   -Continue DVT prophylaxis, lovenos           UTI    Cx: klebsiella   S/P Cefepime   On keflex till 1/29       DM2   - A1C 10   -POCT glucose   -on lantus 22U BID   - medium dose ISS   - hypoglycemia protocol       Bipolar disorder   - stable   - continue remeron 7.5 mg PO nightly   - effexor  mg po daily   - trazadone 150 mg PO nightly       Infectious Disease   Podiatry   Cultures: Group A strep.  anaerobe contaminated.        Assessment   Isabel Chua is a 49 y.o. female with    Diabetic foot ulcer to subcutaneous layer, bilateral feet   Cellulitis, bilateral feet   Superficial abscess, bilateral feet   Poorly uncontrolled DM2 with peripheral neuropathy. S/p bedside I&D right foot       Active Problems:     Type 2 diabetes mellitus without complication, with long-term current use of insulin ([facility])     Right foot infection     Cellulitis and abscess of toe of right foot     Abscess of right foot   Resolved Problems:     * No resolved hospital problems. *           Plan       Patient examined and evaluated at bedside    Treatment options discussed in detail with the patient. Antibiotic: Clindamycin and cefipime per ID. Appreciate recommendation    Medical management per primary. MRI bilateral feet: no definite deep abscess or osteomyelitis. DVT scan: chronic DVT to RLE. Dressing applied to right foot: iodoform packing, dsd, ace   CT scan on 1/25 shows possible abscess vs. Phlegmon   Plan OR at 12. Consent signed   Foot marked   NPO at midnight   Morning AC held   Patient is COVID vaccinated   Dressing remained intact to left foot: betadine, dsd, ace   Non weight bearing to right foot.  WBAT to left foot   PODIATRY BRIEF OP NOTE       PATIENT NAME: Donnie Spring   YOB: 1965  -  51 y.o. female   MRN: [id number]   DATE: 1/27/2022   BILLING #: [id number]       Surgeon(s):   Edu Rae DPM        ASSISTANTS: Elba Andrade DPM PGY-1       PRE-OP DIAGNOSIS:    Abscess, right foot   Cellulitis, right foot   Uncontrolled type DM2 with peripheral neuropathy       POST-OP DIAGNOSIS: Same as above.       PROCEDURE:    Incision and drainage, right foot   Debridement of non-viable tissue and bone, right foot       ANESTHESIA: MAC

## 2022-01-27 NOTE — CARE COORDINATION
Call from Dl Alfredo at [facility] to inquire if patient is vaccinated for covid. I verified that she is vaccinated. Dl Alfredo stated they are reviewing her now, it is looking good and she will call me back this afternoon.

## 2022-01-28 ENCOUNTER — CARE COORDINATION (OUTPATIENT)
Dept: CARE COORDINATION | Age: 57
End: 2022-01-28

## 2022-01-28 LAB
ABSOLUTE EOS #: <0.03 K/UL (ref 0–0.44)
ABSOLUTE IMMATURE GRANULOCYTE: 0.19 K/UL (ref 0–0.3)
ABSOLUTE LYMPH #: 1.56 K/UL (ref 1.1–3.7)
ABSOLUTE MONO #: 1.22 K/UL (ref 0.1–1.2)
ANION GAP SERPL CALCULATED.3IONS-SCNC: 11 MMOL/L (ref 9–17)
BASOPHILS # BLD: 0 % (ref 0–2)
BASOPHILS ABSOLUTE: 0.03 K/UL (ref 0–0.2)
BUN BLDV-MCNC: 14 MG/DL (ref 6–20)
BUN/CREAT BLD: ABNORMAL (ref 9–20)
CALCIUM SERPL-MCNC: 9 MG/DL (ref 8.6–10.4)
CHLORIDE BLD-SCNC: 101 MMOL/L (ref 98–107)
CO2: 20 MMOL/L (ref 20–31)
CREAT SERPL-MCNC: 0.84 MG/DL (ref 0.5–0.9)
CULTURE: ABNORMAL
CULTURE: ABNORMAL
DIFFERENTIAL TYPE: ABNORMAL
DIRECT EXAM: ABNORMAL
EOSINOPHILS RELATIVE PERCENT: 0 % (ref 1–4)
GFR AFRICAN AMERICAN: >60 ML/MIN
GFR NON-AFRICAN AMERICAN: >60 ML/MIN
GFR SERPL CREATININE-BSD FRML MDRD: ABNORMAL ML/MIN/{1.73_M2}
GFR SERPL CREATININE-BSD FRML MDRD: ABNORMAL ML/MIN/{1.73_M2}
GLUCOSE BLD-MCNC: 137 MG/DL (ref 65–105)
GLUCOSE BLD-MCNC: 185 MG/DL (ref 65–105)
GLUCOSE BLD-MCNC: 264 MG/DL (ref 65–105)
GLUCOSE BLD-MCNC: 395 MG/DL (ref 65–105)
GLUCOSE BLD-MCNC: 448 MG/DL (ref 70–99)
HCT VFR BLD CALC: 23.6 % (ref 36.3–47.1)
HEMOGLOBIN: 7.7 G/DL (ref 11.9–15.1)
IMMATURE GRANULOCYTES: 1 %
LYMPHOCYTES # BLD: 10 % (ref 24–43)
Lab: ABNORMAL
MCH RBC QN AUTO: 31.7 PG (ref 25.2–33.5)
MCHC RBC AUTO-ENTMCNC: 32.6 G/DL (ref 28.4–34.8)
MCV RBC AUTO: 97.1 FL (ref 82.6–102.9)
MONOCYTES # BLD: 8 % (ref 3–12)
NRBC AUTOMATED: 0.1 PER 100 WBC
PDW BLD-RTO: 13.4 % (ref 11.8–14.4)
PLATELET # BLD: 392 K/UL (ref 138–453)
PLATELET ESTIMATE: ABNORMAL
PMV BLD AUTO: 10.8 FL (ref 8.1–13.5)
POTASSIUM SERPL-SCNC: 5.1 MMOL/L (ref 3.7–5.3)
RBC # BLD: 2.43 M/UL (ref 3.95–5.11)
RBC # BLD: ABNORMAL 10*6/UL
SEG NEUTROPHILS: 80 % (ref 36–65)
SEGMENTED NEUTROPHILS ABSOLUTE COUNT: 11.93 K/UL (ref 1.5–8.1)
SODIUM BLD-SCNC: 132 MMOL/L (ref 135–144)
SPECIMEN DESCRIPTION: ABNORMAL
WBC # BLD: 14.9 K/UL (ref 3.5–11.3)
WBC # BLD: ABNORMAL 10*3/UL

## 2022-01-28 PROCEDURE — 85025 COMPLETE CBC W/AUTO DIFF WBC: CPT

## 2022-01-28 PROCEDURE — 97116 GAIT TRAINING THERAPY: CPT

## 2022-01-28 PROCEDURE — 82947 ASSAY GLUCOSE BLOOD QUANT: CPT

## 2022-01-28 PROCEDURE — 94760 N-INVAS EAR/PLS OXIMETRY 1: CPT

## 2022-01-28 PROCEDURE — 36415 COLL VENOUS BLD VENIPUNCTURE: CPT

## 2022-01-28 PROCEDURE — 6370000000 HC RX 637 (ALT 250 FOR IP): Performed by: PODIATRIST

## 2022-01-28 PROCEDURE — 2580000003 HC RX 258: Performed by: PODIATRIST

## 2022-01-28 PROCEDURE — 80048 BASIC METABOLIC PNL TOTAL CA: CPT

## 2022-01-28 PROCEDURE — 6370000000 HC RX 637 (ALT 250 FOR IP): Performed by: STUDENT IN AN ORGANIZED HEALTH CARE EDUCATION/TRAINING PROGRAM

## 2022-01-28 PROCEDURE — 6360000002 HC RX W HCPCS: Performed by: PODIATRIST

## 2022-01-28 PROCEDURE — 97110 THERAPEUTIC EXERCISES: CPT

## 2022-01-28 PROCEDURE — 99232 SBSQ HOSP IP/OBS MODERATE 35: CPT | Performed by: FAMILY MEDICINE

## 2022-01-28 PROCEDURE — 97530 THERAPEUTIC ACTIVITIES: CPT

## 2022-01-28 PROCEDURE — 2500000003 HC RX 250 WO HCPCS: Performed by: PODIATRIST

## 2022-01-28 PROCEDURE — 99232 SBSQ HOSP IP/OBS MODERATE 35: CPT | Performed by: INTERNAL MEDICINE

## 2022-01-28 PROCEDURE — 97535 SELF CARE MNGMENT TRAINING: CPT

## 2022-01-28 PROCEDURE — 1200000000 HC SEMI PRIVATE

## 2022-01-28 PROCEDURE — 2500000003 HC RX 250 WO HCPCS: Performed by: INTERNAL MEDICINE

## 2022-01-28 RX ORDER — CLINDAMYCIN PHOSPHATE 600 MG/50ML
600 INJECTION INTRAVENOUS EVERY 8 HOURS
Status: DISCONTINUED | OUTPATIENT
Start: 2022-01-28 | End: 2022-01-29

## 2022-01-28 RX ORDER — LINEZOLID 600 MG/1
600 TABLET, FILM COATED ORAL EVERY 12 HOURS SCHEDULED
Status: DISCONTINUED | OUTPATIENT
Start: 2022-01-28 | End: 2022-01-28

## 2022-01-28 RX ORDER — INSULIN GLARGINE 100 [IU]/ML
25 INJECTION, SOLUTION SUBCUTANEOUS 2 TIMES DAILY
Status: DISCONTINUED | OUTPATIENT
Start: 2022-01-28 | End: 2022-02-04

## 2022-01-28 RX ORDER — LINEZOLID 2 MG/ML
600 INJECTION, SOLUTION INTRAVENOUS EVERY 12 HOURS
Status: DISCONTINUED | OUTPATIENT
Start: 2022-01-28 | End: 2022-01-28

## 2022-01-28 RX ADMIN — ENOXAPARIN SODIUM 30 MG: 100 INJECTION SUBCUTANEOUS at 08:30

## 2022-01-28 RX ADMIN — INSULIN LISPRO 2 UNITS: 100 INJECTION, SOLUTION INTRAVENOUS; SUBCUTANEOUS at 11:43

## 2022-01-28 RX ADMIN — CEPHALEXIN 500 MG: 500 CAPSULE ORAL at 17:52

## 2022-01-28 RX ADMIN — DICYCLOMINE HYDROCHLORIDE 10 MG: 10 CAPSULE ORAL at 16:25

## 2022-01-28 RX ADMIN — OXYCODONE HYDROCHLORIDE 5 MG: 5 TABLET ORAL at 14:00

## 2022-01-28 RX ADMIN — MIRTAZAPINE 7.5 MG: 15 TABLET, FILM COATED ORAL at 21:09

## 2022-01-28 RX ADMIN — OXYCODONE HYDROCHLORIDE 5 MG: 5 TABLET ORAL at 09:41

## 2022-01-28 RX ADMIN — DICYCLOMINE HYDROCHLORIDE 10 MG: 10 CAPSULE ORAL at 12:21

## 2022-01-28 RX ADMIN — SIMETHICONE 80 MG: 80 TABLET, CHEWABLE ORAL at 16:25

## 2022-01-28 RX ADMIN — TRAZODONE HYDROCHLORIDE 150 MG: 100 TABLET ORAL at 21:09

## 2022-01-28 RX ADMIN — INSULIN LISPRO 10 UNITS: 100 INJECTION, SOLUTION INTRAVENOUS; SUBCUTANEOUS at 08:31

## 2022-01-28 RX ADMIN — PANTOPRAZOLE SODIUM 20 MG: 40 TABLET, DELAYED RELEASE ORAL at 16:25

## 2022-01-28 RX ADMIN — INSULIN GLARGINE 25 UNITS: 100 INJECTION, SOLUTION SUBCUTANEOUS at 08:31

## 2022-01-28 RX ADMIN — OXYCODONE HYDROCHLORIDE 5 MG: 5 TABLET ORAL at 17:56

## 2022-01-28 RX ADMIN — FLUTICASONE PROPIONATE 1 SPRAY: 50 SPRAY, METERED NASAL at 08:30

## 2022-01-28 RX ADMIN — OXYCODONE HYDROCHLORIDE 5 MG: 5 TABLET ORAL at 21:58

## 2022-01-28 RX ADMIN — ACETAMINOPHEN 650 MG: 325 TABLET ORAL at 17:58

## 2022-01-28 RX ADMIN — CEPHALEXIN 500 MG: 500 CAPSULE ORAL at 05:24

## 2022-01-28 RX ADMIN — PREGABALIN 200 MG: 100 CAPSULE ORAL at 21:09

## 2022-01-28 RX ADMIN — CEPHALEXIN 500 MG: 500 CAPSULE ORAL at 00:15

## 2022-01-28 RX ADMIN — INSULIN LISPRO 3 UNITS: 100 INJECTION, SOLUTION INTRAVENOUS; SUBCUTANEOUS at 21:31

## 2022-01-28 RX ADMIN — DICYCLOMINE HYDROCHLORIDE 10 MG: 10 CAPSULE ORAL at 08:30

## 2022-01-28 RX ADMIN — PANTOPRAZOLE SODIUM 20 MG: 40 TABLET, DELAYED RELEASE ORAL at 08:30

## 2022-01-28 RX ADMIN — CLINDAMYCIN IN 5 PERCENT DEXTROSE 600 MG: 12 INJECTION, SOLUTION INTRAVENOUS at 11:43

## 2022-01-28 RX ADMIN — PREGABALIN 200 MG: 100 CAPSULE ORAL at 08:30

## 2022-01-28 RX ADMIN — SODIUM CHLORIDE, PRESERVATIVE FREE 10 ML: 5 INJECTION INTRAVENOUS at 21:10

## 2022-01-28 RX ADMIN — INSULIN LISPRO 10 UNITS: 100 INJECTION, SOLUTION INTRAVENOUS; SUBCUTANEOUS at 05:24

## 2022-01-28 RX ADMIN — ACETAMINOPHEN 650 MG: 325 TABLET ORAL at 12:20

## 2022-01-28 RX ADMIN — CLINDAMYCIN IN 5 PERCENT DEXTROSE 600 MG: 12 INJECTION, SOLUTION INTRAVENOUS at 21:10

## 2022-01-28 RX ADMIN — VENLAFAXINE HYDROCHLORIDE 150 MG: 150 CAPSULE, EXTENDED RELEASE ORAL at 08:30

## 2022-01-28 RX ADMIN — OXYCODONE HYDROCHLORIDE 5 MG: 5 TABLET ORAL at 00:15

## 2022-01-28 RX ADMIN — CEPHALEXIN 500 MG: 500 CAPSULE ORAL at 11:43

## 2022-01-28 RX ADMIN — OXYCODONE HYDROCHLORIDE 5 MG: 5 TABLET ORAL at 05:24

## 2022-01-28 RX ADMIN — DICYCLOMINE HYDROCHLORIDE 10 MG: 10 CAPSULE ORAL at 21:09

## 2022-01-28 RX ADMIN — INSULIN GLARGINE 25 UNITS: 100 INJECTION, SOLUTION SUBCUTANEOUS at 21:30

## 2022-01-28 RX ADMIN — BUDESONIDE AND FORMOTEROL FUMARATE DIHYDRATE 2 PUFF: 80; 4.5 AEROSOL RESPIRATORY (INHALATION) at 21:31

## 2022-01-28 ASSESSMENT — ENCOUNTER SYMPTOMS
CONSTIPATION: 0
ABDOMINAL PAIN: 1
SHORTNESS OF BREATH: 0
EYE DISCHARGE: 0
CHEST TIGHTNESS: 0
PHOTOPHOBIA: 0
COLOR CHANGE: 0
VOMITING: 0
COUGH: 0
ABDOMINAL DISTENTION: 0
DIARRHEA: 0
COLOR CHANGE: 1
BACK PAIN: 0
NAUSEA: 0
APNEA: 0

## 2022-01-28 ASSESSMENT — PAIN SCALES - GENERAL
PAINLEVEL_OUTOF10: 10
PAINLEVEL_OUTOF10: 8
PAINLEVEL_OUTOF10: 10
PAINLEVEL_OUTOF10: 8
PAINLEVEL_OUTOF10: 9
PAINLEVEL_OUTOF10: 10
PAINLEVEL_OUTOF10: 9
PAINLEVEL_OUTOF10: 10
PAINLEVEL_OUTOF10: 9
PAINLEVEL_OUTOF10: 8
PAINLEVEL_OUTOF10: 8
PAINLEVEL_OUTOF10: 10
PAINLEVEL_OUTOF10: 10
PAINLEVEL_OUTOF10: 9
PAINLEVEL_OUTOF10: 8
PAINLEVEL_OUTOF10: 10
PAINLEVEL_OUTOF10: 10

## 2022-01-28 ASSESSMENT — PAIN DESCRIPTION - ORIENTATION
ORIENTATION: RIGHT
ORIENTATION: RIGHT
ORIENTATION: RIGHT;LEFT

## 2022-01-28 ASSESSMENT — PAIN DESCRIPTION - PAIN TYPE
TYPE: ACUTE PAIN;CHRONIC PAIN;SURGICAL PAIN
TYPE: SURGICAL PAIN
TYPE: SURGICAL PAIN

## 2022-01-28 ASSESSMENT — PAIN DESCRIPTION - FREQUENCY: FREQUENCY: CONTINUOUS

## 2022-01-28 ASSESSMENT — PAIN DESCRIPTION - LOCATION
LOCATION: FOOT
LOCATION: FOOT
LOCATION: FOOT;GENERALIZED

## 2022-01-28 ASSESSMENT — PAIN DESCRIPTION - PROGRESSION: CLINICAL_PROGRESSION: NOT CHANGED

## 2022-01-28 ASSESSMENT — PAIN DESCRIPTION - DESCRIPTORS: DESCRIPTORS: ACHING;DISCOMFORT

## 2022-01-28 ASSESSMENT — PAIN DESCRIPTION - ONSET: ONSET: ON-GOING

## 2022-01-28 NOTE — PROGRESS NOTES
Progress Note  Podiatric Medicine and Surgery     Subjective     CC: right foot pain    Patient seen and examined at bedside. S/p I&D right foot (DOS:1/23/22)   Reports sciatica pain on the left and DVT pain on the right. Abdominal pain tolerable. HPI :  Christ Posadas is a 49 y.o. female seen at [facility] for right foot pain. Patient stepped on a piece of glass a few days ago. She went to her PCP when PCP removed the glass. She presented to the ED last night due to increased pain and swelling to the right lower extremity. Upon arrival, X-ray was taken which showed no foreign body, acute osseous deformity or soft tissue emphysema. During my evaluation, patient also mentioned pain on her plantar left foot and  pain is not as bad as her right foot. Per chart review, patient visited [facility] podiatry clinic a year ago for diabetic foot care but never follows up after that. 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 Her last A1C in 12/13/2021 is 13.3%. She admits neuropathic pain to bilateral lower extremity. She denies other pedal complaints. ROS: Denies N/V/F/C/SOB/CP. Otherwise negative except at stated in the HPI.      Medications:  Scheduled Meds:   insulin glargine  25 Units SubCUTAneous BID    cephALEXin  500 mg Oral 4 times per day    enoxaparin  30 mg SubCUTAneous BID    clindamycin (CLEOCIN) IV  600 mg IntraVENous Q8H    budesonide-formoterol  2 puff Inhalation BID    dicyclomine  10 mg Oral 4x Daily    fluticasone  1 spray Each Nostril Daily    mirtazapine  7.5 mg Oral Nightly    pantoprazole  20 mg Oral BID AC    pregabalin  200 mg Oral BID    traZODone  150 mg Oral Nightly    venlafaxine  150 mg Oral Daily with breakfast    sodium chloride flush  5-40 mL IntraVENous 2 times per day    insulin lispro  0-12 Units SubCUTAneous TID WC    insulin lispro  0-6 Units SubCUTAneous Nightly       Continuous Infusions:   sodium chloride      sodium chloride 100 mL/hr at 22 1153    dextrose         PRN Meds:simethicone, diphenhydrAMINE, albuterol sulfate HFA, oxyCODONE, sodium chloride flush, sodium chloride, ondansetron **OR** ondansetron, polyethylene glycol, acetaminophen **OR** acetaminophen, potassium chloride **OR** potassium alternative oral replacement **OR** potassium chloride, glucose, dextrose, glucagon (rDNA), dextrose    Objective     Vitals:  Patient Vitals for the past 8 hrs:   BP Temp Temp src Pulse Resp SpO2   22 0815 119/68 98 °F (36.7 °C) Oral 86 16 96 %     Average, Min, and Max for last 24 hours Vitals:  TEMPERATURE:  Temp  Av.1 °F (36.7 °C)  Min: 97 °F (36.1 °C)  Max: 99.1 °F (37.3 °C)    RESPIRATIONS RANGE: Resp  Av.5  Min: 0  Max: 20    PULSE RANGE: Pulse  Av.4  Min: 81  Max: 98    BLOOD PRESSURE RANGE:  Systolic (63NHE), NJR:017 , Min:102 , IJY:628   ; Diastolic (40XJP), DQW:02, Min:62, Max:95      PULSE OXIMETRY RANGE: SpO2  Av.9 %  Min: 81 %  Max: 100 %    I/O last 3 completed shifts: In: 975 [P.O.:425; I.V.:550]  Out: 800 [Urine:800]    CBC:  Recent Labs     22  0404 22  0619 22  0342   WBC 13.9* 10.9 14.9*   HGB 9.3* 7.7* 7.7*   HCT 30.2* 25.3* 23.6*    357 392        BMP:  Recent Labs     22  0404 22  0619 22  0342   * 141 132*   K 3.6* 4.4 5.1    109* 101   CO2 19* 20 20   BUN 13 16 14   CREATININE 0.94* 0.97* 0.84   GLUCOSE 64* 83 448*   CALCIUM 8.8 8.7 9.0        Coags:  No results for input(s): APTT, PROT, INR in the last 72 hours. Lab Results   Component Value Date    SEDRATE 124 (H) 2022     No results for input(s): CRP in the last 72 hours. Lower Extremity Physical Exam:   Vascular: DP and PT pulses are palpable. CFT <3 seconds to all digits. Hair growth is absent to the level of the digits.        Neuro: Saph/sural/SP/DP/plantar sensation diminished to light touch.     Musculoskeletal: Muscle strength is 5/5 to all lower extremity muscle groups. Gross deformity is status post left 4th and 5th digit amputation. Tenderness to palpation to previous entry point of injury on the right foot and hyperkeratotic tissue on the left foot.      Dermatologic:     Diffuse edema noted throughout the right lower extremity with moderate erythema and increased warmth - improved     Right foot: Incision is coapted. Tamar-incision skin is soft and macerated. No fluctuance. Induration noted. Left foot: Full thickness ulcer #2 located lateral plantar left foot and measures approximately 1cmx 1cmx0.2cm. Base is fibrotic. Small amount of purulent drainage noted with no associated mal odor. Does not probe to bone, sinus track, or undermine. No fluctuance, crepitus, or induration. Interdigital maceration absent. Clinical Images:              Imaging:   XR ABDOMEN (KUB) (SINGLE AP VIEW)   Final Result   Increased stool burden seen diffusely throughout the colon suggesting   clinical presentation of constipation. Phleboliths seen on both sides of the   pelvis. CT FOOT RIGHT W CONTRAST   Final Result   1. Shallow soft tissue ulceration plantar and medial to the 1st metatarsal   base with an underlying area of possible abscess versus phlegmon measuring   2.8 x 1.1 x 0.9 cm.   2. Extensive subcutaneous fat stranding compatible with cellulitis. No soft   tissue gas. 3. No evidence of osteomyelitis or other acute osseous abnormality. US RENAL COMPLETE   Final Result   Unremarkable renal ultrasound. MRI FOOT RIGHT WO CONTRAST   Final Result   Subcutaneous edema and swelling, most notably affecting the dorsum of the   foot, which may reflect underlying cellulitis. No discrete organized fluid   collection is seen within the limitations of a noncontrast study. No imaging features of acute osteomyelitis appreciated. MRI FOOT LEFT WO CONTRAST   Final Result   Limited evaluation due to patient motion artifact on several sequences.       Skin thickening and subcutaneous edema seen within the plantar soft tissues   of the foot at the level of the proximal metatarsals, which may reflect   underlying cellulitis. Within the limitations of a noncontrast study, no   discrete organized fluid collection is seen. 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 VL DUP LOWER EXTREMITY VENOUS BILATERAL   Final Result      XR FOOT RIGHT (MIN 3 VIEWS)   Final Result   Right foot: No radiopaque foreign body. No fracture. Moderate soft tissue   swelling within the anterior aspect of plantar aspect of the foot   predominantly underlying meta tarsal heads. Left foot: Changes related to prior partial amputation of 4th and 5th ray as   described. No acute fracture. No radiopaque foreign body. Moderate soft   tissue swelling and plantar aspect of the foot. No discrete soft tissue ulcer   is noted. XR FOOT LEFT (MIN 3 VIEWS)   Final Result   Right foot: No radiopaque foreign body. No fracture. Moderate soft tissue   swelling within the anterior aspect of plantar aspect of the foot   predominantly underlying meta tarsal heads. Left foot: Changes related to prior partial amputation of 4th and 5th ray as   described. No acute fracture. No radiopaque foreign body. Moderate soft   tissue swelling and plantar aspect of the foot. No discrete soft tissue ulcer   is noted. Cultures: Group A strep. anaerobe contaminated. Intra -op culture: rare GPC in pairs    Chuckie Lamar is a 49 y.o. female with   1. Diabetic foot ulcer to subcutaneous layer, bilateral feet  2. Cellulitis, bilateral feet  3. Superficial abscess, bilateral feet  4. Poorly uncontrolled DM2 with peripheral neuropathy.    5. S/p bedside I&D right foot    Active Problems:    Type 2 diabetes mellitus without complication, with long-term current use of insulin (HCC)    Right foot infection    Cellulitis and abscess of toe of right foot    Abscess of right foot    Bilateral cellulitis of lower leg related to related to  uncontrolled diabetes  Resolved Problems:    * No resolved hospital problems. *       Plan     · Patient examined and evaluated at bedside   · Treatment options discussed in detail with the patient. · Antibiotic: Clindamycin and cefipime per ID. Appreciate recommendation   · Medical management per primary. · MRI bilateral feet: no definite deep abscess or osteomyelitis. · DVT scan: chronic DVT to RLE. · Dressing applied to right foot: iodoform packing, dsd, ace  · CT scan on 1/25 shows possible abscess vs. Phlegmon  · S/p incision and drainage. Will monitor for a day or two. Wound looks significantly better than before. · Dressing remained changed to left foot: adaptic, dsd, ace  · Non weight bearing to right foot.  WBAT to left foot  · Discussed with Dr. Glenn Pritchett, [place]   Podiatric Medicine & Surgery   1/28/2022 at 9:07 AM

## 2022-01-28 NOTE — FLOWSHEET NOTE
SPIRITUAL CARE DEPARTMENT - [facility] [address]  PROGRESS NOTE    Shift date: 1/27/22  Shift day: Thursday   Shift # 2    Room # 4809/5580-34   Name: Gordo Sibley            Age: 49 y.o. Gender: female          Judaism: Baptist   Place of Anabaptist: Unknown    Referral: Routine Visit    Admit Date & Time: 1/22/2022  4:43 PM    PATIENT/EVENT DESCRIPTION:  Gordo Sibley is a 49 y.o. female who has MRSA     SPIRITUAL ASSESSMENT/INTERVENTION:   followed up with perfect serve request from nurse who requested a  visit the patient.  provided prayers as needed for the Pt. The pt was less stressed after visitation. The pt was grateful for the visitation. SPIRITUAL CARE FOLLOW-UP PLAN:  Chaplains will remain available to offer spiritual and emotional support as needed. Electronically signed by Abdi Kaur, on 1/27/2022 at [email or web address]  [phone]       01/27/22 1956   Encounter Summary   Services provided to: Patient   Referral/Consult From: Nurse   Support System Family members   Continue Visiting   (1/27/22)   Volunteer Visit No   Complexity of Encounter Low   Length of Encounter 45 minutes   Spiritual Assessment Completed Yes   Routine   Type Initial   Assessment Calm; Approachable;Coping;Peaceful   Intervention Active listening;Sustaining presence/ Ministry of presence;Prayer;Nurtured hope;Explored feelings, thoughts, concerns; Discussed meaning/purpose;Discussed relationship with God   Outcome Acceptance;Comfort;Expressed gratitude;Expressed feelings of raffi, peace, and/or awe;Engaged in conversation;Venting emotion;Receptive; Hopeful;Encouraged; Less anxious, less agitated;Coping;Expressed feelings/needs/concerns

## 2022-01-28 NOTE — PROGRESS NOTES
[facility] -   Infectious diseases evaluation  admission date 1/22/2022    reason for consultation:   Diabetic foot infection    Impression :   Current:  · Right diabetic foot infection, GAS  · Cellulitis both feet  · bandemia  · Bacteriuria vs UTI kleb S ancef    Other:  ·   Discussion / summary of stay / plan of care   ·   Recommendations   · clindamycin iv - pend SA ad GAS finl sens of the OR wound  · Elevate feet and see if cellulitis resolves  · I/D right foot abscess 1/27  · Wound Cx 1/23: Strep Pyogenes  · UC 1/24 : Klebsiella- no dysuria - but lower abd pain - on keflex till 1/29  ·     Infection Control Recommendations   · [place] Precautions  · Contact Isolation       Antimicrobial Stewardship Recommendations   · Simplification of therapy  · Targeted therapy  Coordination ofOutpatient Care:   · Estimated Length of IV antimicrobials:  · Patient will need Midline / picc Catheter Insertion:   · Patient will need SNF:  · Patient will need outpatient wound care:     History of Present Illness:   Initial history:  Donnie Castañeda is a 49y.o.-year-old female with diabetes very well-known to my service, stepped on a piece of glass a few days prior to admission presented with swelling and drainage from right foot, culture came back with group A strep,  Patient has neuropathy from diabetes  WBC elevated 14,  sed [id number],     Podiatry debrided the superficial ulcer and felt it was not too deep yet there was cellulitis of both feet. Hemoglobin A1c was 10    MRI of the left foot no osteomyelitis  MRI of the right foot no osteomyelitis    Leukocytosis responded to antibiotics, infectious consulted for antibiotic management  Patient is on cefepime and clindamycin            R foot very swollen and tender to light touch - suspect deeper involvement and needs more I/D    1/26  Pt is afebrile and stable this morning. WBC trending up (14 today).  UC 1/24 growing Klebsiella. CT right leg 1/25 shows an abscess  Planned for I/D on 1/27  Pain still ++    1/27  Pt seen and examined at bedside, afebrile and hypotensive this morning. Endorsed a lot of pain overnight. WBC improved to 11   To go to OR today for I+D of R foot abscess. Started on Keflex until 1/29 for UTI  Will monitor on antibiotics and await podiatry recs    1/28  Pt afebrile and stable s/p I+D right foot yesterday. Pt c/o pain this morning but is managing. Pt also endorsing abdominal discomfort. XR Abd showing increased stool burden. WBC did increase to 15 from 11 yesterday. Wound Cx 1/27: pending but GPC in pairs seen. Will monitor on Keflex and Clinda      Interval changes  1/28/2022   Patient Vitals for the past 8 hrs:   BP Temp Temp src Pulse Resp SpO2   01/28/22 0815 119/68 98 °F (36.7 °C) Oral 86 16 96 %       Summary of relevant labs:  Labs:  W14 > 11>15  [id number]    Micro:  cx foot pus GAS  Uc 1/24: Klebsiella  Wound Cx 1/27: GPC pairs - cx pend    Imaging:  XR abd 1/27: Increased stool burden seen diffusely throughout the colon suggesting   clinical presentation of constipation.  Phleboliths seen on both sides of the   pelvis. CT R foot 1/25:  1. Shallow soft tissue ulceration plantar and medial to the 1st metatarsal   base with an underlying area of possible abscess versus phlegmon measuring   2.8 x 1.1 x 0.9 cm.   2. Extensive subcutaneous fat stranding compatible with cellulitis.  No soft   tissue gas. 3. No evidence of osteomyelitis or other acute osseous abnormality. U/S renal 1/25: Unremarkable    MRI R foot 1/23:  Subcutaneous edema and swelling, most notably affecting the dorsum of the   foot, which may reflect underlying cellulitis.  No discrete organized fluid   collection is seen within the limitations of a noncontrast study.       No imaging features of acute osteomyelitis appreciated.        MRI L foot: 1/23:   Limited evaluation due to patient motion artifact on several sequences.       Skin thickening and subcutaneous edema seen within the plantar soft tissues   of the foot at the level of the proximal metatarsals, which may reflect   underlying cellulitis.  Within the limitations of a noncontrast study, no   discrete organized fluid collection is seen.       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 I have personally reviewed the past medical history, past surgical history, medications, social history, and family history, and I haveupdated the database accordingly. Allergies:   Bactrim [sulfamethoxazole-trimethoprim] and Adhesive tape     Review of Systems:     Review of Systems   Constitutional: Negative for activity change and diaphoresis. HENT: Negative for congestion. Eyes: Negative for photophobia and discharge. Respiratory: Negative for apnea and cough. Cardiovascular: Negative for chest pain. Gastrointestinal: Negative for abdominal distention. Endocrine: Negative for cold intolerance. Genitourinary: Negative for dysuria and flank pain. Musculoskeletal: Negative for arthralgias. Skin: Positive for color change and wound. Allergic/Immunologic: Negative for immunocompromised state. Neurological: Negative for dizziness, seizures, light-headedness, numbness and headaches. Hematological: Negative for adenopathy. Psychiatric/Behavioral: Negative for agitation. Physical Examination :       Physical Exam  Constitutional:       General: She is not in acute distress. Appearance: Normal appearance. She is not ill-appearing, toxic-appearing or diaphoretic. HENT:      Head: Normocephalic and atraumatic. Nose: Nose normal. No congestion. Eyes:      General: No scleral icterus. Pupils: Pupils are equal, round, and reactive to light. Cardiovascular:      Rate and Rhythm: Normal rate and regular rhythm.       Heart sounds: Normal heart sounds. No murmur heard. Pulmonary:      Effort: No respiratory distress. Breath sounds: Normal breath sounds. Abdominal:      General: There is no distension. Palpations: Abdomen is soft. Genitourinary:     Comments: Urine fatuma  Musculoskeletal:         General: Swelling present. No tenderness. Cervical back: Neck supple. No rigidity or tenderness. Skin:     General: Skin is dry. Coloration: Skin is not jaundiced. Findings: Erythema present. Neurological:      Mental Status: She is alert and oriented to person, place, and time. Mental status is at baseline. Psychiatric:         Mood and Affect: Mood normal.         Thought Content:  Thought content normal.         Past Medical History:     Past Medical History:   Diagnosis Date    Acid reflux 5/29/2017    Acute cystitis with hematuria 10/11/2016    Acute non-recurrent maxillary sinusitis 11/28/2017    Asthma     Bipolar 1 disorder ([address] 1/4/2018    Bipolar disorder, mixed ([address]     BMI 34.0-34.9,adult 11/28/2017    Cannabis use disorder, severe, dependence ([address] 12/19/2017    Cerebrovascular accident (CVA) ([address] 6/14/2017    Chest pain 11/5/2016    Chronic renal insufficiency     Cocaine abuse ([address] 1/5/2018    COVID-19 virus RNA test result unknown     DDD (degenerative disc disease), cervical     Diabetes mellitus ([address]     Dizziness 6/13/2017    Fibromyalgia     History of stroke 9/9/2017    Homicidal ideation 11/6/2017    Hyperglycemia     Hypertension     Hypotension 1/18/2019    IDDM (insulin dependent diabetes mellitus) 12/21/2015    Lupus ([address]     Migraine     Neuropathy     Neuropathy     Polysubstance abuse ([address] 9/17/2017    Post traumatic stress disorder (PTSD)     Posttraumatic stress disorder 5/29/2017    Recurrent depression ([address] 5/29/2017    Recurrent major depressive disorder, in partial remission ([address] 11/28/2017    Screening mammogram, encounter for 11/28/2017    Severe recurrent major depression with psychotic features ([facility] 12/19/2017    Severe recurrent major depression without psychotic features ([facility] 12/19/2017    Stroke (cerebrum) ([facility] 6/14/2017    Stroke [facility]     per chart notes    Suicidal ideation     Suicidal intent 3/10/2017    Vitamin D deficiency 11/28/2017    White matter changes 6/13/2017       Past Surgical  History:     Past Surgical History:   Procedure Laterality Date    ABDOMEN SURGERY      drain tube    ABSCESS DRAINAGE      right buttock    CATARACT REMOVAL WITH IMPLANT Bilateral     CHEST TUBE INSERTION      FINGER AMPUTATION Left 03/13/2021    LEFT RING FINER AMPUTATION performed by Mirela Orozco MD at [facility] Right 10/11/2021    AMPUTATION RIGHT INDEX FINGER performed by Mirela Orozco MD at [address] [place] [address] Right 01/27/2022    FOOT ABSCESS INCISION AND DRAINAGE (PULSE LAVAGE, CULTURE SWABS) - Right    HAND SURGERY Right 09/14/2021    I&D INDEX FINGER performed by Mirela Orozco MD at [address] [place] [address] Right 09/15/2021    I&D INDEX FINGER performed by Mirela Orozco MD at [address] [place] [address] Bilateral        Medications:      insulin glargine  25 Units SubCUTAneous BID    cephALEXin  500 mg Oral 4 times per day    enoxaparin  30 mg SubCUTAneous BID    clindamycin (CLEOCIN) IV  600 mg IntraVENous Q8H    budesonide-formoterol  2 puff Inhalation BID    dicyclomine  10 mg Oral 4x Daily    fluticasone  1 spray Each Nostril Daily    mirtazapine  7.5 mg Oral Nightly    pantoprazole  20 mg Oral BID AC    pregabalin  200 mg Oral BID    traZODone  150 mg Oral Nightly    venlafaxine  150 mg Oral Daily with breakfast    sodium chloride flush  5-40 mL IntraVENous 2 times per day    insulin lispro  0-12 Units SubCUTAneous TID WC    insulin lispro  0-6 Units SubCUTAneous Nightly       Social History:     Social History     Socioeconomic History    Marital status: Legally  Spouse name: Not on file    Number of children: Not on file    Years of education: Not on file    Highest education level: Not on file   Occupational History    Not on file   Tobacco Use    Smoking status: Never Smoker    Smokeless tobacco: Never Used   Vaping Use    Vaping Use: Never used   Substance and Sexual Activity    Alcohol use: Not Currently    Drug use: Yes     Types: Marijuana Michaela Melany), Cocaine     Comment: + Cocaine 2/2021 also, see Care Everywhere UDS    Sexual activity: Not Currently     Partners: Male   Other Topics Concern    Not on file   Social History Narrative    Not on file     Social Determinants of Health     Financial Resource Strain: High Risk    Difficulty of Paying Living Expenses: Hard   Food Insecurity: Food Insecurity Present    Worried About Running Out of Food in the Last Year: Often true    Kelin of Food in the Last Year: Often true   Transportation Needs: Unmet Transportation Needs    Lack of Transportation (Medical):  Yes    Lack of Transportation (Non-Medical): Yes   Physical Activity: Inactive    Days of Exercise per Week: 0 days    Minutes of Exercise per Session: 0 min   Stress: Stress Concern Present    Feeling of Stress : Rather much   Social Connections:     Frequency of Communication with Friends and Family: Not on file    Frequency of Social Gatherings with Friends and Family: Not on file    Attends Mormonism Services: Not on file    Active Member of Clubs or Organizations: Not on file    Attends Club or Organization Meetings: Not on file    Marital Status: Not on file   Intimate Partner Violence:     Fear of Current or Ex-Partner: Not on file    Emotionally Abused: Not on file    Physically Abused: Not on file    Sexually Abused: Not on file   Housing Stability: High Risk    Unable to Pay for Housing in the Last Year: Yes    Number of [place] in the Last Year: 2    Unstable Housing in the Last Year: Yes       Family History:     Family History   Problem Relation Age of Onset    Diabetes Mother     Stroke Mother     Diabetes Father     Diabetes Sister     Diabetes Brother     Other Son         GSW    No Known Problems Sister       Medical Decision Making:   I have independently reviewed/ordered the following labs:    CBC with Differential:   Recent Labs     01/27/22  0619 01/28/22 0342   WBC 10.9 14.9*   HGB 7.7* 7.7*   HCT 25.3* 23.6*    392   LYMPHOPCT 26 10*   MONOPCT 11 8     BMP:  Recent Labs     01/27/22  0619 01/28/22  0342    132*   K 4.4 5.1   * 101   CO2 20 20   BUN 16 14   CREATININE 0.97* 0.84     Hepatic Function Panel:   No results for input(s): PROT, LABALBU, BILIDIR, IBILI, BILITOT, ALKPHOS, ALT, AST in the last 72 hours. No results for input(s): RPR in the last 72 hours. No results for input(s): HIV in the last 72 hours. No results for input(s): BC in the last 72 hours. Lab Results   Component Value Date    CREATININE 0.84 01/28/2022    GLUCOSE 448 01/28/2022       Detailed results: Thank you for allowing us to participate in the care of this patient. Please call with questions. This note is created with the assistance of a speech recognition program.  While intending to generate adocument that actually reflects the content of the visit, the document can still have some errors including those of syntax and sound a like substitutions which may escape proof reading. It such instances, actual meaningcan be extrapolated by contextual diversion. Odalys Bhatti MS4 on behalf of Dr. Arturo Owusu      I have discussed the care of the patient, including pertinent history and exam findings,  with the resident. I have seen and examined the patient and the key elements of all parts of the encounter have been performed by me. I agree with the assessment, plan and orders as documented by the resident.     Maylin Puga, Infectious Diseases

## 2022-01-28 NOTE — PROGRESS NOTES
Comprehensive Nutrition Assessment    Type and Reason for Visit:  Initial (LOS)    Nutrition Recommendations/Plan: Continue current diet. Encourage/monitor PO intakes as tolerated. Monitor need for oral supplements with meals. Will monitor labs, weight, and plan of care. Nutrition Assessment:  Chart reviewed for length of stay. Admitted with right foot pain and swelling after stepping on a piece of glass. Pt also with c/o abdominal pain upon admission. PMH includes: CVA, DM, HTN. Pt s/p bedside I&D right foot. Pt reports having a fair appetite and eating variable amounts of her meals. Observed lunch tray which pt ate most of soup and a small salad. Weight history reviewed - stable with weight gain noted. Last BM 1/22. Labs reviewed: Na 132 mmol/L, Glucose 185-448 mg/dL. Meds reviewed. Malnutrition Assessment:  Malnutrition Status: At risk for malnutrition    Context:  Acute Illness     Findings of the 6 clinical characteristics of malnutrition:  Energy Intake:  Mild decrease in energy intake  Weight Loss:  No significant weight loss     Body Fat Loss:  No significant body fat loss   Muscle Mass Loss:  No significant muscle mass loss  Fluid Accumulation:  1 - Mild Extremities   Strength:  Not Performed    Estimated Daily Nutrient Needs:  Energy (kcal):  MSJ x 1.05-1.1 = 7816-7707 kcals/day; Weight Used for Energy Requirements:  Current     Protein (g):  1.2-1.5 gm/kg = 70-90 gm pro/day; Weight Used for Protein Requirements:  Ideal        Fluid (ml/day):  2100 mL/day or per MD; Method Used for Fluid Requirements: Mj Pillai      Nutrition Related Findings:  Labs/Meds reviewed. Last BM 1/22. Wounds:  Surgical Incision (to right foot)       Current Nutrition Therapies:    ADULT DIET;  Regular; 4 carb choices (60 gm/meal)    Anthropometric Measures:  · Height: 5' 6\" (167.6 cm)  · Current Body Weight: 251 lb 15.8 oz (114.3 kg)   · Admission Body Weight: 251 lb 15.8 oz (114.3 kg) · Usual Body Weight: 218 lb 7.6 oz (99.1 kg) (11/4/21 bed scale per chart)     · Ideal Body Weight: 130 lbs; % Ideal Body Weight 193.8 %   · BMI: 40.7  · BMI Categories: Obese Class 3 (BMI 40.0 or greater)       Nutrition Diagnosis:   · Increased nutrient needs related to  (healing) as evidenced by wounds    Nutrition Interventions:   Food and/or Nutrient Delivery:  Continue Current Diet (Monitor need for oral supplements.)  Nutrition Education/Counseling:  No recommendation at this time   Coordination of Nutrition Care:  Continue to monitor while inpatient    Goals:  Oral intakes to meet at least 75% of estimated nutrition needs. Nutrition Monitoring and Evaluation:   Behavioral-Environmental Outcomes:  None Identified   Food/Nutrient Intake Outcomes:  Food and Nutrient Intake  Physical Signs/Symptoms Outcomes:  Biochemical Data,GI Status,Fluid Status or Edema,Hemodynamic Status,Nutrition Focused Physical Findings,Skin,Weight     Discharge Planning:     Too soon to determine     Electronically signed by Julien Moran RD, LD on 1/28/22 at 3:49 PM EST    Contact: [phone]

## 2022-01-28 NOTE — ANESTHESIA POSTPROCEDURE EVALUATION
Department of Anesthesiology  Postprocedure Note    Patient: Mai Boggs  MRN: [id number]  YOB: 1965  Date of evaluation: 1/28/2022  Time:  6:11 AM     Procedure Summary     Date: 01/27/22 Room / Location: [address]    Anesthesia Start: 8133 Anesthesia Stop: 6405    Procedure: FOOT ABSCESS INCISION AND DRAINAGE  (PULSE LAVAGE, CULTURE SWABS) (Right Foot) Diagnosis: (ABSCESS RIGHT FOOT)    Surgeons: Duy Madrid DPM Responsible Provider: Sincere Brown MD    Anesthesia Type: MAC ASA Status: 3          Anesthesia Type: MAC    Elin Phase I:      Elin Phase II: Elin Score: 10    Last vitals: Reviewed and per EMR flowsheets.        Anesthesia Post Evaluation    Patient location during evaluation: floor  Patient participation: complete - patient participated  Level of consciousness: awake and alert  Pain score: 3  Airway patency: patent  Nausea & Vomiting: no nausea and no vomiting  Complications: no  Cardiovascular status: hemodynamically stable  Respiratory status: acceptable  Hydration status: stable

## 2022-01-28 NOTE — CARE COORDINATION
[facility]. phoned patient today tot follow up on her wellbeing and plans for moving. Patient was busy with PT at the time of the call. HC suggested that she speak  with the patient at another time. Patient stated that they were finishing up and she would be able to speak. It sounded like the patient was engaging well with  the PT. HC waited for them to wrap up, patient was upbeat and sounded more hopeful today. HC and patient didn't get far into the conversation when  patient stated that her daughter was calling and we got off of the phone.     Plan of Care  [facility] will follow up with patient regarding SNF and moving plans

## 2022-01-28 NOTE — PROGRESS NOTES
Physical Therapy  Facility/Department: [address] ORTHO/MED SURG  Daily Treatment Note  NAME: Mai Boggs  : 1965  MRN: [id number]    Date of Service: 2022    Discharge Recommendations:  Further therapy recommended at discharge. The patient should be able to tolerate at least 3 hours of therapy per day over 5 days or 15 hours over 7 days. This patient may benefit from a Physical Medicine and Rehab consult. PT Equipment Recommendations  Equipment Needed: No    Assessment   Body structures, Functions, Activity limitations: Decreased functional mobility ; Decreased strength;Decreased endurance; Increased pain  Assessment: Pt amb 3 ft x 2 with RW and MOD A, while maintainign NWB to R LE. Unsafe to return to prior living arrangmetns at this time, recommend aggressvie PT after d/c to address deficits  Prognosis: Good  REQUIRES PT FOLLOW UP: Yes  Activity Tolerance  Activity Tolerance: Patient Tolerated treatment well;Patient limited by fatigue     Patient Diagnosis(es): The primary encounter diagnosis was Right foot infection. A diagnosis of Elevated d-dimer was also pertinent to this visit.      has a past medical history of Acid reflux, Acute cystitis with hematuria, Acute non-recurrent maxillary sinusitis, Asthma, Bipolar 1 disorder ([address] Bipolar disorder, mixed ([address] BMI 34.0-34.9,adult, Cannabis use disorder, severe, dependence ([address] Cerebrovascular accident (CVA) ([address] Chest pain, Chronic renal insufficiency, Cocaine abuse ([address] COVID-19 virus RNA test result unknown, DDD (degenerative disc disease), cervical, Diabetes mellitus ([address] Dizziness, Fibromyalgia, History of stroke, Homicidal ideation, Hyperglycemia, Hypertension, Hypotension, IDDM (insulin dependent diabetes mellitus), Lupus ([address] Migraine, Neuropathy, Neuropathy, Polysubstance abuse ([address] Post traumatic stress disorder (PTSD), Posttraumatic stress disorder, Recurrent depression ([address] Recurrent major depressive disorder, in partial remission ([facility] Screening mammogram, encounter for, Severe recurrent major depression with psychotic features ([facility] Severe recurrent major depression without psychotic features ([facility] Stroke (cerebrum) ([facility] Stroke ([facility] Suicidal ideation, Suicidal intent, Vitamin D deficiency, and White matter changes. has a past surgical history that includes Abscess Drainage; chest tube insertion; Abdomen surgery; Cataract removal with implant (Bilateral); LASIK (Bilateral); Finger amputation (Left, 03/13/2021); Hand surgery (Right, 09/14/2021); Hand surgery (Right, 09/15/2021); Finger amputation (Right, 10/11/2021); Foot surgery (Right, 01/27/2022); and Foot Debridement (Right, 1/27/2022). Restrictions  Restrictions/Precautions  Restrictions/Precautions: Weight Bearing,Up as Tolerated  Required Braces or Orthoses?: Yes (Sx shoe)  Lower Extremity Weight Bearing Restrictions  Right Lower Extremity Weight Bearing: Non Weight Bearing  Left Lower Extremity Weight Bearing: Weight Bearing As Tolerated  Upper Extremity Weight Bearing Restrictions  Other: Non weight bearing to right foot. WBAT to left foot with Sx shoe per podatiry note 1/28/22  Subjective   General  Response To Previous Treatment: Patient with no complaints from previous session.   Family / Caregiver Present: No  Subjective  Subjective: Pt sitting EOB upon arrival, agreeable to PT, pleasnat and cooepative throughout  Pain Screening  Patient Currently in Pain: Yes  Pain Assessment  Pain Assessment: 0-10  Pain Level: 8  Pain Type: Surgical pain  Pain Location: Foot  Pain Orientation: Right  Vital Signs  Patient Currently in Pain: Yes  Pre Treatment Pain Screening  Intervention List: Patient able to continue with treatment    Orientation  Orientation  Overall Orientation Status: Within Normal Limits  Cognition      Objective   Bed mobility  Rolling to Right: Stand by assistance  Sit to Supine: Stand by assistance  Scooting: Stand by assistance  Transfers  Sit to Stand: Moderate Assistance;2 Person Assistance  Stand to sit: Moderate Assistance;2 Person Assistance  Ambulation  Ambulation?: Yes  Ambulation 1  Surface: level tile  Device: Rolling Walker  Assistance: Moderate assistance  Quality of Gait: decreased balance and safety awarness, scoots on L LE, while maintaining NWB status on R LE  Gait Deviations: Shuffles  Distance: 3 ft x 2  Comments: 3 ft x 2 seated rest eric inbtwn, along EOB  Stairs/Curb  Stairs?: No     Balance  Posture: Good  Sitting - Static: Good  Sitting - Dynamic: Good  Standing - Static: Fair  Standing - Dynamic: Fair;-  Comments: standing with RW    Exercise  Supine Exercises: Ankle Pumps, Gluteal sets, Heel Slides, Hip ABD/ADD, Quad Sets, SAQ, SLR.  Reps: 10x       AM-PAC Score  AM-PAC Inpatient Mobility Raw Score : 15 (01/28/22 1610)  AM-PAC Inpatient T-Scale Score : 39.45 (01/28/22 1610)  Mobility Inpatient CMS 0-100% Score: 57.7 (01/28/22 1610)  Mobility Inpatient CMS G-Code Modifier : CK (01/28/22 1610)          Goals  Short term goals  Time Frame for Short term goals: 10 visits  Short term goal 1: transfers with SBA  Short term goal 2: amb 75 ft with a RW x SBA with WBAT B LE's  Short term goal 3: to be independent with bed mobility  Short term goal 4: 20 min exercise program x SBA  Patient Goals   Patient goals : Return home    Plan    Plan  Times per week: 5-6x wk  Times per day: Daily  Current Treatment Recommendations: Strengthening,Functional Mobility Training,Gait Training,Safety Education & Training,Endurance Training,Pain Management  Safety Devices  Type of devices: Nurse notified,Left in bed,Call light within reach,Bed alarm in place  Restraints  Initially in place: No     Therapy Time   Individual Concurrent Group Co-treatment   Time In 1514         Time Out 1540         Minutes 26         Timed Code Treatment Minutes: [address], [facility]

## 2022-01-28 NOTE — PROGRESS NOTES
Occupational Therapy  Facility/Department: [address] ORTHO/MED SURG  Daily Treatment Note  NAME: Isabel Chua  : 1965  MRN: [id number]    Date of Service: 2022    Discharge Recommendations: Pt. Would benefit from further skilled OT services to enhance functional outcomes. Patient would benefit from continued therapy after discharge  OT Equipment Recommendations  ADL Assistive Devices: Grab Bars - toilet;Transfer Tub Bench; Toileting - 3-in-1 Commode    Assessment   Performance deficits / Impairments: Decreased functional mobility ; Decreased ADL status; Decreased endurance;Decreased high-level IADLs;Decreased safe awareness;Decreased balance;Decreased strength;Decreased sensation  Prognosis: Fair  Decision Making: Medium Complexity  Patient Education: OT role, OT POC, purpose of tx, hand placement for transfers, activity promotion, WB precautions, use of RW, adaptive ADL strategies - fair return  REQUIRES OT FOLLOW UP: Yes  Activity Tolerance  Activity Tolerance: Patient Tolerated treatment well  Safety Devices  Safety Devices in place: Yes  Type of devices: Gait belt;Call light within reach; Left in bed;Nurse notified  Restraints  Initially in place: No         Patient Diagnosis(es): The primary encounter diagnosis was Right foot infection. A diagnosis of Elevated d-dimer was also pertinent to this visit.       has a past medical history of Acid reflux, Acute cystitis with hematuria, Acute non-recurrent maxillary sinusitis, Asthma, Bipolar 1 disorder ([address] Bipolar disorder, mixed ([address] BMI 34.0-34.9,adult, Cannabis use disorder, severe, dependence ([address] Cerebrovascular accident (CVA) ([address] Chest pain, Chronic renal insufficiency, Cocaine abuse ([address] COVID-19 virus RNA test result unknown, DDD (degenerative disc disease), cervical, Diabetes mellitus ([address] Dizziness, Fibromyalgia, History of stroke, Homicidal ideation, Hyperglycemia, Hypertension, Hypotension, IDDM (insulin dependent diabetes mellitus), Lupus ([facility] Migraine, Neuropathy, Neuropathy, Polysubstance abuse ([facility] Post traumatic stress disorder (PTSD), Posttraumatic stress disorder, Recurrent depression ([facility] Recurrent major depressive disorder, in partial remission ([facility] Screening mammogram, encounter for, Severe recurrent major depression with psychotic features ([facility] [address] [facility] Severe recurrent major depression without psychotic features ([facility] [address] [facility] Stroke (cerebrum) ([facility] [address] [facility] Stroke ([facility] Suicidal ideation, Suicidal intent, Vitamin D deficiency, and White matter changes. has a past surgical history that includes Abscess Drainage; chest tube insertion; Abdomen surgery; Cataract removal with implant (Bilateral); LASIK (Bilateral); Finger amputation (Left, 03/13/2021); Hand surgery (Right, 09/14/2021); Hand surgery (Right, 09/15/2021); Finger amputation (Right, 10/11/2021); Foot surgery (Right, 01/27/2022); and Foot Debridement (Right, 1/27/2022). Restrictions  Restrictions/Precautions  Restrictions/Precautions: Weight Bearing,Up as Tolerated  Required Braces or Orthoses?: Yes (L LE, Foot/Ankle Post Surgical Boot per MD order 1/28/22)  Lower Extremity Weight Bearing Restrictions  Right Lower Extremity Weight Bearing: Non Weight Bearing  Left Lower Extremity Weight Bearing: Weight Bearing As Tolerated  Upper Extremity Weight Bearing Restrictions  Other: Non weight bearing to right foot. WBAT to left foot per podatiry note 1/28/22  Subjective   General  Patient assessed for rehabilitation services?: Yes  Family / Caregiver Present: No  General Comment  Comments: RN ok'd patient for OT tx. Pt pleasant and cooperative throughout. Pain Assessment  Pain Level: 8  Pain Type: Surgical pain  Pain Location: Foot  Pain Orientation: Right  Non-Pharmaceutical Pain Intervention(s): Emotional support;Distraction; Ambulation/Increased Activity;Repositioned  Vital Signs  Patient Currently in Pain: Yes   Orientation  Orientation  Overall Orientation Status: Within Functional Limits  Objective    ADL  Grooming: Independent;Setup  LE Dressing: Increased time to complete;Maximum assistance  Additional Comments: Grooming sitting EOB- I after set up (shower cap/comb hair) sitting EOB. LB dressing- max A to don L sx shoe d/t difficulty maintaining 4 figure tech. Balance  Sitting Balance: Independent (I sitting EOB for grooming ~20 minutes, pt. Tearful at times d/t recent loss of spouse, emotional support provided)  Standing Balance: Minimal assistance (x2)  Standing Balance  Time: ~~2 minutes 2 times  Activity: static/dynamic/side steps to [facility]. Comment: using RW for support. Pt. lou GALLEGOS NWB to R LE, L sx shoe on during standing activity. Functional Mobility  Functional - Mobility Device: Rolling Walker  Activity: Other (~4 Side steps to [facility])  Assist Level: Minimal assistance (x2)  Functional Mobility Comments: Verbal cues for safety and RW management. Cues to stay on task, pt. singing/dancing while standing, lou 1 minor LOB, min A x 2 overall. Bed mobility  Supine to Sit: Stand by assistance  Sit to Supine: Stand by assistance  Scooting: Stand by assistance  Comment: bed rail  Transfers  Sit to stand: 2 Person assistance; Moderate assistance  Stand to sit: 2 Person assistance; Moderate assistance  Transfer Comments: Mod-min A x 2 + verbal cues for B hand placement, ptPierre ryderover. Cognition  Overall Cognitive Status: Exceptions  Safety Judgement: Decreased awareness of need for assistance;Decreased awareness of need for safety  Insights: Fully aware of deficits  Initiation: Does not require cues  Sequencing: Requires cues for some  Cognition Comment: Pt. needing verbal cues for safety with transfers and RW management, PtPierre ROPER carryover.                                          Plan   Plan  Times per week: 3-4x/wk  Current Treatment Recommendations: Strengthening,Endurance Training,Equipment Evaluation, Education, & procurement,Self-Care / ADL,Patient/Caregiver Education & Training,Home Management Training,Safety Education & Training,Functional Mobility Training,Balance Training                                                    AM-PAC Score        AM-PAC Inpatient Daily Activity Raw Score: 20 (01/28/22 1534)  AM-PAC Inpatient ADL T-Scale Score : 42.03 (01/28/22 1534)  ADL Inpatient CMS 0-100% Score: 38.32 (01/28/22 1534)  ADL Inpatient CMS G-Code Modifier : Lauren Douse (01/28/22 1534)    Goals  Short term goals  Time Frame for Short term goals: Patient will, by discharge  Short term goal 1: demo LB ADLs at Mod I using AE PRN  Short term goal 2: demo toileting tasks at Supervision using AE PRN  Short term goal 3: demo functional transfers/mobility using LRD at SBA to engage in ADLs  Short term goal 4: demo 8+ min of dynamic standing tolerance with uni/bilateral hand release at SBA to engage in ADLs       Therapy Time   Individual Concurrent Group Co-treatment   Time In 1449         Time Out 1532         Minutes 43         Timed Code Treatment Minutes: 5 APARNA Morillo/RUBINA

## 2022-01-28 NOTE — OP NOTE
PODIATRY OP NOTE    PATIENT NAME: Dione Yu DATE: 1965  -  49 y.o. female  MRN: [id number]  DATE: 1/27/2022  BILLING #: [id number]    Surgeon(s):  Ariadne Hallman DPM     ASSISTANTS: Satinder Rodriguez DPM PGY-1    PRE-OP DIAGNOSIS:   Abscess, right foot  Cellulitis, right foot  Uncontrolled type DM2 with peripheral neuropathy    POST-OP DIAGNOSIS: Same as above. PROCEDURE:   Incision and drainage, right foot multiple incisions       ANESTHESIA: MAC    HEMOSTASIS: Pneumatic ankle tourniquet @ 250 mmHg for 20 minutes. ESTIMATED BLOOD LOSS: Less than 10cc. MATERIALS:   * No implants in log *    INJECTABLES: 1:1 mix of 0.5% marcaine plain and 1% lidocaine plain    SPECIMEN:   ID Type Source Tests Collected by Time Destination   1 : Right Foot  Abscess Swab Swab Foot [facility]348        COMPLICATIONS: none    FINDINGS: 3cc of abscess was drained through the incision. Induration noted around wound. Skin was closed. INDICATIONS FOR PROCEDURE:  The patient is a 49 with a history of nonhealing  ulcer with abscess. Patient stepped on a piece of glass a while back. CT was obtained, which showed possible abscess underneath the wound. Patient had elevated white blood cell count. Physical exam indicated a nonhealing ulceration with malodor and israel-wound erythema. Bedside I&D's were attempted but due to pain and extent of infection, full exposure of the wound was not obtainable. Patient failed conservative treatments and elected to undergo surgery. Risks and benefits were discussed. No guarantees were given or implied. Consent is signed and in the chart. PROCEDURE IN DETAIL:  Under mild sedation, the patient was brought back to the operating room and placed on the OR table in a supine position. IV sedation was initiated by the Anesthesia Department. A well-padded pneumatic ankle tourniquet was applied to the right ankle.   The right foot was then anesthetized with 10 cc of a 1:1 solution of 1% lidocaine and 0.5% Marcaine plain. The operative limb was then prepped and draped in the usual aseptic fashion. Time-out was performed to confirm the correct patient, correct site, and correct procedure. Everyone in the room was in agreement. Attention was directed towards the medial arch of right foot. There is an ulceration likely the entry point of previous injury. Fibrotic tissue was overlying the majority of the wound bed with israel-wound induration. At this point, 2  linear incisions were made over the wound. The incisions were deepened below fascial plane and 2 cc of purulence was expressed. There was fibrotic material throughout the entire ulceration. There was noted to be some tracking and undermining in the distal direction. A culture swab was obtained through the tracking. All purulent drainage was expressed utill there was normal bleeding tissue noted. All necrotic and fibrotic tissue was excised . A rongeur was utilized to remove any devitalized necrotic tissue that remained status post incision. At this point, the wound was flushed with saline. Following this, the wounds were again flushed with saline. At this point, any bleeding vessels were then ligated with electrocautery. There was some bleeding, but no excessive bleeding appreciated. Skin was then closed with 3-0 prolene    Dressings consisting of adaptic, 4 x 4s, Kerlix and an Ace bandage were applied. The patient tolerated the above procedure and anesthesia well without complications. The patient was transported from the operating room to the PACU with vital signs stable and vascular status intact to the right foot. Patient was then transferred back to the floor. The patient was counseled at length about the risks of cm Covid-19 during their perioperative period and any recovery window from their procedure.   The patient was made aware that cm Covid-19  may worsen their prognosis for recovering from their procedure  and lend to a higher morbidity and/or mortality risk. All material risks, benefits, and reasonable alternatives including postponing the procedure were discussed. The patient does wish to proceed with the procedure at this time.     Darreld Credit, SARA   Podiatric Medicine & Surgery   1/27/2022 at 7:43 PM

## 2022-01-28 NOTE — PLAN OF CARE
Nutrition Problem #1: Increased nutrient needs  Intervention: Food and/or Nutrient Delivery: Continue Current Diet (Monitor need for oral supplements.)  Nutritional Goals: Oral intakes to meet at least 75% of estimated nutrition needs.

## 2022-01-28 NOTE — PROGRESS NOTES
(degenerative disc disease), cervical, Diabetes mellitus ([facility] [address] Dizziness, Fibromyalgia, History of stroke, Homicidal ideation, Hyperglycemia, Hypertension, Hypotension, IDDM (insulin dependent diabetes mellitus), Lupus ([facility] [address] Migraine, Neuropathy, Neuropathy, Polysubstance abuse ([facility] [address] Post traumatic stress disorder (PTSD), Posttraumatic stress disorder, Recurrent depression ([facility] [address] Recurrent major depressive disorder, in partial remission ([facility] [address] Screening mammogram, encounter for, Severe recurrent major depression with psychotic features ([facility] [address] Severe recurrent major depression without psychotic features ([facility] [address] Stroke (cerebrum) ([facility] [address] Stroke ([facility] [address] Suicidal ideation, Suicidal intent, Vitamin D deficiency, and White matter changes. PAST SURGICAL HISTORY:      has a past surgical history that includes Abscess Drainage; chest tube insertion; Abdomen surgery; Cataract removal with implant (Bilateral); LASIK (Bilateral); Finger amputation (Left, 03/13/2021); Hand surgery (Right, 09/14/2021); Hand surgery (Right, 09/15/2021); Finger amputation (Right, 10/11/2021); and Foot surgery (Right, 01/27/2022). SOCIALHISTORY:      reports that she has never smoked. She has never used smokeless tobacco. She reports previous alcohol use. She reports current drug use. Drugs: Marijuana (Weed) and Cocaine. FAMILY HISTORY:      family history includes Diabetes in her brother, father, mother, and sister; No Known Problems in her sister; Other in her son; Stroke in her mother. HOME MEDICATIONS:      Prior to Admission medications    Medication Sig Start Date End Date Taking? Authorizing Provider   pregabalin (LYRICA) 200 MG capsule Take 1 capsule by mouth 2 times daily for 30 days.  1/20/22 2/19/22  Margaret King MD   insulin glargine (LANTUS SOLOSTAR) 100 UNIT/ML injection pen Inject 30 Units into the skin 2 times daily 1/20/22   Margaret King MD   fluticasone (FLONASE) 50 MCG/ACT nasal spray 1 spray by Each Nostril route daily 12/22/21   Mayra Soler MD   acetaminophen (TYLENOL) 500 MG tablet Take 2 tablets by mouth 3 times daily as needed for Pain 12/22/21   Mayra Soler MD   dicyclomine (BENTYL) 10 MG capsule Take 1 capsule by mouth 4 times daily 12/17/21   Mayra Soler MD   metFORMIN (GLUCOPHAGE) 1000 MG tablet Take 1 tablet by mouth 2 times daily (with meals) 12/17/21   Mayra Soler MD   mirtazapine (REMERON) 7.5 MG tablet Take 1 tablet by mouth nightly 12/17/21   Mayra Soler MD   pantoprazole (PROTONIX) 20 MG tablet Take 1 tablet by mouth 2 times daily (before meals) 12/17/21   Mayra Soler MD   traZODone (DESYREL) 150 MG tablet Take 1 tablet by mouth nightly 12/17/21   Mayra Soler MD   venlafaxine (EFFEXOR XR) 150 MG extended release capsule Take 1 capsule by mouth daily (with breakfast) 12/17/21   Mayra Soler MD   ibuprofen (ADVIL;MOTRIN) 800 MG tablet Take 1 tablet by mouth every 8 hours as needed for Pain 11/15/21 11/29/21  Douglas Rodriguez MD   Lancets MISC 1 each by Does not apply route 3 times daily 11/15/21   Douglas Rodriguez MD   Alcohol Swabs 70 % PADS Use 1 swab 3 times daily as needed 11/15/21   Douglas Rodriguez MD   blood glucose monitor strips Test 3 times a day & as needed for symptoms of irregular blood glucose. Dispense sufficient amount for indicated testing frequency plus additional to accommodate PRN testing needs.  11/8/21   Daniella Martines MD   Lancets MISC 1 each by Does not apply route 3 times daily 11/8/21   Daniella Martines MD   Insulin Pen Needle ([employer] PEN NEEDLES 31G) 31G X 8 MM MISC 1 each by Does not apply route daily 11/8/21   Audi Bolivar MD   FREESTYLE LITE strip 1 each by Does not apply route 3 times daily 11/11/20   Mayra Soler MD   albuterol sulfate  (90 Base) MCG/ACT inhaler Inhale 2 puffs into the lungs every 4 hours as needed for Wheezing 10/20/20 9/22/21  Mayra Soler MD   FLOVENT  MCG/ACT inhaler Inhale 2 puffs into the lungs 2 times daily 10/20/20   Mayra Soler MD   budesonide-formoterol (SYMBICORT) 80-4.5 MCG/ACT AERO Inhale 2 puffs into the lungs 2 times daily 10/20/20   Mayra Soler MD       ALLERGIES:     Bactrim [sulfamethoxazole-trimethoprim] and Adhesive tape    OBJECTIVE:       Vitals:    01/27/22 1402 01/27/22 1551 01/27/22 2025 01/28/22 0014   BP: 124/74 129/76 115/64 110/62   Pulse: 93 98 86 81   Resp: 18 16 16 16   Temp: 99.1 °F (37.3 °C) 98.7 °F (37.1 °C) 98.8 °F (37.1 °C) 97.8 °F (36.6 °C)   TempSrc: Oral Oral Oral Oral   SpO2: 95% 99% 99%    Weight:       Height:             Intake/Output Summary (Last 24 hours) at 1/28/2022 8549  Last data filed at 1/27/2022 1913  Gross per 24 hour   Intake 975 ml   Output 800 ml   Net 175 ml       PHYSICAL EXAM:    Physical Exam  Vitals and nursing note reviewed. Constitutional:       General: She is not in acute distress. Appearance: Normal appearance. She is not ill-appearing. HENT:      Head: Normocephalic and atraumatic. Mouth/Throat:      Pharynx: Oropharynx is clear. Cardiovascular:      Rate and Rhythm: Normal rate and regular rhythm. Pulses: Normal pulses. Heart sounds: No murmur heard. Pulmonary:      Effort: Pulmonary effort is normal.      Breath sounds: Normal breath sounds. Abdominal:      Palpations: Abdomen is soft. There is no mass. Tenderness: There is no abdominal tenderness. Musculoskeletal:         General: No tenderness. Normal range of motion. Cervical back: Normal range of motion and neck supple. Right lower leg: Edema present. Left lower leg: Edema present. Neurological:      General: No focal deficit present. Mental Status: She is alert and oriented to person, place, and time.          ASSESSMENT:      Active Problems:    Type 2 diabetes mellitus without complication, with long-term current use of insulin (HCC)    Right foot infection    Cellulitis and abscess of toe of right foot    Abscess of right foot    Bilateral cellulitis of lower leg related to related to  uncontrolled diabetes  Resolved Problems:    * No resolved hospital problems. *      PLAN:        RLE foot wound/infection  - Tmax 100.4  - leukocytosis, elevated CRP  - X-ray revealed no foreign body in foot. However moderate right foot swelling.  - S/P I&D of right foot and left foot debridement. -S/P Right foot debridment   - on IV clindamycin and cefepime  - pain control  -  ml/hr  - podiatry consulted, follow up recommendations  -ID consulted- appreciate recs.   - elevated D-dime  -Venous doppler shows chronic DVT  -Continue DVT prophylaxis, lovenos      UTI   Cx: klebsiella  S/P Cefepime  On keflex till 1/29    DM2  - A1C 10  -POCT glucose  -on lantus 25U BID  - medium dose ISS  - hypoglycemia protocol     Bipolar disorder  - stable  - continue remeron 7.5 mg PO nightly  - effexor  mg po daily  - trazadone 150 mg PO nightly    Plan will be discussed with the attending, Selwyn Donato MD  Family Medicine Resident  1/28/2022 7:27 AM

## 2022-01-29 LAB
ABSOLUTE EOS #: 0.08 K/UL (ref 0–0.4)
ABSOLUTE IMMATURE GRANULOCYTE: 0.08 K/UL (ref 0–0.3)
ABSOLUTE LYMPH #: 3.43 K/UL (ref 1–4.8)
ABSOLUTE MONO #: 0.78 K/UL (ref 0.1–0.8)
ANION GAP SERPL CALCULATED.3IONS-SCNC: 9 MMOL/L (ref 9–17)
BASOPHILS # BLD: 0 % (ref 0–2)
BASOPHILS ABSOLUTE: 0 K/UL (ref 0–0.2)
BUN BLDV-MCNC: 15 MG/DL (ref 6–20)
BUN/CREAT BLD: ABNORMAL (ref 9–20)
CALCIUM SERPL-MCNC: 8.9 MG/DL (ref 8.6–10.4)
CHLORIDE BLD-SCNC: 107 MMOL/L (ref 98–107)
CO2: 23 MMOL/L (ref 20–31)
CREAT SERPL-MCNC: 0.72 MG/DL (ref 0.5–0.9)
CULTURE: ABNORMAL
DIFFERENTIAL TYPE: ABNORMAL
DIRECT EXAM: ABNORMAL
DIRECT EXAM: ABNORMAL
EOSINOPHILS RELATIVE PERCENT: 1 % (ref 1–4)
GFR AFRICAN AMERICAN: >60 ML/MIN
GFR NON-AFRICAN AMERICAN: >60 ML/MIN
GFR SERPL CREATININE-BSD FRML MDRD: ABNORMAL ML/MIN/{1.73_M2}
GFR SERPL CREATININE-BSD FRML MDRD: ABNORMAL ML/MIN/{1.73_M2}
GLUCOSE BLD-MCNC: 120 MG/DL (ref 70–99)
GLUCOSE BLD-MCNC: 139 MG/DL (ref 65–105)
GLUCOSE BLD-MCNC: 158 MG/DL (ref 65–105)
GLUCOSE BLD-MCNC: 175 MG/DL (ref 65–105)
HCT VFR BLD CALC: 24.3 % (ref 36.3–47.1)
HEMOGLOBIN: 7.6 G/DL (ref 11.9–15.1)
IMMATURE GRANULOCYTES: 1 %
LYMPHOCYTES # BLD: 44 % (ref 24–44)
Lab: ABNORMAL
MCH RBC QN AUTO: 31.4 PG (ref 25.2–33.5)
MCHC RBC AUTO-ENTMCNC: 31.3 G/DL (ref 28.4–34.8)
MCV RBC AUTO: 100.4 FL (ref 82.6–102.9)
MONOCYTES # BLD: 10 % (ref 1–7)
MORPHOLOGY: ABNORMAL
NRBC AUTOMATED: 0.4 PER 100 WBC
PDW BLD-RTO: 13.7 % (ref 11.8–14.4)
PLATELET # BLD: 378 K/UL (ref 138–453)
PLATELET ESTIMATE: ABNORMAL
PMV BLD AUTO: 10.6 FL (ref 8.1–13.5)
POTASSIUM SERPL-SCNC: 4.5 MMOL/L (ref 3.7–5.3)
RBC # BLD: 2.42 M/UL (ref 3.95–5.11)
RBC # BLD: ABNORMAL 10*6/UL
SEG NEUTROPHILS: 44 % (ref 36–66)
SEGMENTED NEUTROPHILS ABSOLUTE COUNT: 3.43 K/UL (ref 1.8–7.7)
SODIUM BLD-SCNC: 139 MMOL/L (ref 135–144)
SPECIMEN DESCRIPTION: ABNORMAL
WBC # BLD: 7.8 K/UL (ref 3.5–11.3)
WBC # BLD: ABNORMAL 10*3/UL

## 2022-01-29 PROCEDURE — 6360000002 HC RX W HCPCS: Performed by: PODIATRIST

## 2022-01-29 PROCEDURE — 97162 PT EVAL MOD COMPLEX 30 MIN: CPT

## 2022-01-29 PROCEDURE — 6370000000 HC RX 637 (ALT 250 FOR IP): Performed by: PODIATRIST

## 2022-01-29 PROCEDURE — 99232 SBSQ HOSP IP/OBS MODERATE 35: CPT | Performed by: INTERNAL MEDICINE

## 2022-01-29 PROCEDURE — 97164 PT RE-EVAL EST PLAN CARE: CPT

## 2022-01-29 PROCEDURE — 82947 ASSAY GLUCOSE BLOOD QUANT: CPT

## 2022-01-29 PROCEDURE — 6360000002 HC RX W HCPCS: Performed by: STUDENT IN AN ORGANIZED HEALTH CARE EDUCATION/TRAINING PROGRAM

## 2022-01-29 PROCEDURE — 2500000003 HC RX 250 WO HCPCS: Performed by: INTERNAL MEDICINE

## 2022-01-29 PROCEDURE — 6360000002 HC RX W HCPCS: Performed by: INTERNAL MEDICINE

## 2022-01-29 PROCEDURE — 36415 COLL VENOUS BLD VENIPUNCTURE: CPT

## 2022-01-29 PROCEDURE — 85025 COMPLETE CBC W/AUTO DIFF WBC: CPT

## 2022-01-29 PROCEDURE — 1200000000 HC SEMI PRIVATE

## 2022-01-29 PROCEDURE — 6370000000 HC RX 637 (ALT 250 FOR IP): Performed by: STUDENT IN AN ORGANIZED HEALTH CARE EDUCATION/TRAINING PROGRAM

## 2022-01-29 PROCEDURE — 76937 US GUIDE VASCULAR ACCESS: CPT

## 2022-01-29 PROCEDURE — 80048 BASIC METABOLIC PNL TOTAL CA: CPT

## 2022-01-29 PROCEDURE — 97530 THERAPEUTIC ACTIVITIES: CPT

## 2022-01-29 PROCEDURE — 99233 SBSQ HOSP IP/OBS HIGH 50: CPT | Performed by: FAMILY MEDICINE

## 2022-01-29 PROCEDURE — 2580000003 HC RX 258: Performed by: INTERNAL MEDICINE

## 2022-01-29 RX ADMIN — OXYCODONE HYDROCHLORIDE 5 MG: 5 TABLET ORAL at 16:23

## 2022-01-29 RX ADMIN — VENLAFAXINE HYDROCHLORIDE 150 MG: 150 CAPSULE, EXTENDED RELEASE ORAL at 08:37

## 2022-01-29 RX ADMIN — FLUTICASONE PROPIONATE 1 SPRAY: 50 SPRAY, METERED NASAL at 08:38

## 2022-01-29 RX ADMIN — OXYCODONE HYDROCHLORIDE 5 MG: 5 TABLET ORAL at 23:54

## 2022-01-29 RX ADMIN — CEPHALEXIN 500 MG: 500 CAPSULE ORAL at 05:09

## 2022-01-29 RX ADMIN — PANTOPRAZOLE SODIUM 20 MG: 40 TABLET, DELAYED RELEASE ORAL at 16:23

## 2022-01-29 RX ADMIN — TRAZODONE HYDROCHLORIDE 150 MG: 100 TABLET ORAL at 21:51

## 2022-01-29 RX ADMIN — CEPHALEXIN 500 MG: 500 CAPSULE ORAL at 00:09

## 2022-01-29 RX ADMIN — PANTOPRAZOLE SODIUM 20 MG: 40 TABLET, DELAYED RELEASE ORAL at 08:38

## 2022-01-29 RX ADMIN — OXYCODONE HYDROCHLORIDE 5 MG: 5 TABLET ORAL at 08:37

## 2022-01-29 RX ADMIN — OXYCODONE HYDROCHLORIDE 5 MG: 5 TABLET ORAL at 05:09

## 2022-01-29 RX ADMIN — ACETAMINOPHEN 650 MG: 325 TABLET ORAL at 08:41

## 2022-01-29 RX ADMIN — OXYCODONE HYDROCHLORIDE 5 MG: 5 TABLET ORAL at 12:01

## 2022-01-29 RX ADMIN — CLINDAMYCIN IN 5 PERCENT DEXTROSE 600 MG: 12 INJECTION, SOLUTION INTRAVENOUS at 05:09

## 2022-01-29 RX ADMIN — OXYCODONE HYDROCHLORIDE 5 MG: 5 TABLET ORAL at 19:57

## 2022-01-29 RX ADMIN — PREGABALIN 200 MG: 100 CAPSULE ORAL at 08:37

## 2022-01-29 RX ADMIN — ONDANSETRON 4 MG: 4 TABLET, ORALLY DISINTEGRATING ORAL at 12:01

## 2022-01-29 RX ADMIN — INSULIN LISPRO 2 UNITS: 100 INJECTION, SOLUTION INTRAVENOUS; SUBCUTANEOUS at 16:23

## 2022-01-29 RX ADMIN — CEFAZOLIN 2000 MG: 10 INJECTION, POWDER, FOR SOLUTION INTRAVENOUS at 20:06

## 2022-01-29 RX ADMIN — INSULIN LISPRO 1 UNITS: 100 INJECTION, SOLUTION INTRAVENOUS; SUBCUTANEOUS at 22:04

## 2022-01-29 RX ADMIN — ACETAMINOPHEN 650 MG: 325 TABLET ORAL at 00:10

## 2022-01-29 RX ADMIN — PREGABALIN 200 MG: 100 CAPSULE ORAL at 21:52

## 2022-01-29 RX ADMIN — ACETAMINOPHEN 650 MG: 325 TABLET ORAL at 23:28

## 2022-01-29 RX ADMIN — DIPHENHYDRAMINE HYDROCHLORIDE 25 MG: 50 INJECTION, SOLUTION INTRAMUSCULAR; INTRAVENOUS at 23:24

## 2022-01-29 RX ADMIN — BUDESONIDE AND FORMOTEROL FUMARATE DIHYDRATE 2 PUFF: 80; 4.5 AEROSOL RESPIRATORY (INHALATION) at 08:39

## 2022-01-29 RX ADMIN — MIRTAZAPINE 7.5 MG: 15 TABLET, FILM COATED ORAL at 21:51

## 2022-01-29 RX ADMIN — BUDESONIDE AND FORMOTEROL FUMARATE DIHYDRATE 2 PUFF: 80; 4.5 AEROSOL RESPIRATORY (INHALATION) at 22:03

## 2022-01-29 RX ADMIN — DICYCLOMINE HYDROCHLORIDE 10 MG: 10 CAPSULE ORAL at 12:09

## 2022-01-29 RX ADMIN — INSULIN GLARGINE 25 UNITS: 100 INJECTION, SOLUTION SUBCUTANEOUS at 08:44

## 2022-01-29 RX ADMIN — INSULIN GLARGINE 25 UNITS: 100 INJECTION, SOLUTION SUBCUTANEOUS at 22:03

## 2022-01-29 RX ADMIN — CEFAZOLIN 2000 MG: 10 INJECTION, POWDER, FOR SOLUTION INTRAVENOUS at 12:03

## 2022-01-29 RX ADMIN — DICYCLOMINE HYDROCHLORIDE 10 MG: 10 CAPSULE ORAL at 21:52

## 2022-01-29 RX ADMIN — DICYCLOMINE HYDROCHLORIDE 10 MG: 10 CAPSULE ORAL at 08:38

## 2022-01-29 RX ADMIN — ACETAMINOPHEN 650 MG: 325 TABLET ORAL at 16:23

## 2022-01-29 RX ADMIN — DICYCLOMINE HYDROCHLORIDE 10 MG: 10 CAPSULE ORAL at 16:23

## 2022-01-29 ASSESSMENT — ENCOUNTER SYMPTOMS
PHOTOPHOBIA: 0
COLOR CHANGE: 0
COLOR CHANGE: 1
VOMITING: 0
ABDOMINAL PAIN: 0
APNEA: 0
NAUSEA: 0
ABDOMINAL DISTENTION: 0
EYE DISCHARGE: 0
BACK PAIN: 0
DIARRHEA: 0
CHEST TIGHTNESS: 0
SHORTNESS OF BREATH: 0
CONSTIPATION: 0
COUGH: 0

## 2022-01-29 ASSESSMENT — PAIN SCALES - GENERAL
PAINLEVEL_OUTOF10: 7
PAINLEVEL_OUTOF10: 10
PAINLEVEL_OUTOF10: 8
PAINLEVEL_OUTOF10: 3
PAINLEVEL_OUTOF10: 10
PAINLEVEL_OUTOF10: 10
PAINLEVEL_OUTOF10: 3
PAINLEVEL_OUTOF10: 10
PAINLEVEL_OUTOF10: 10
PAINLEVEL_OUTOF10: 3
PAINLEVEL_OUTOF10: 7
PAINLEVEL_OUTOF10: 2

## 2022-01-29 ASSESSMENT — PAIN DESCRIPTION - DESCRIPTORS
DESCRIPTORS: ACHING;DISCOMFORT
DESCRIPTORS: ACHING;CONSTANT

## 2022-01-29 ASSESSMENT — PAIN DESCRIPTION - PROGRESSION

## 2022-01-29 ASSESSMENT — PAIN DESCRIPTION - LOCATION
LOCATION: FOOT
LOCATION: FOOT

## 2022-01-29 ASSESSMENT — PAIN DESCRIPTION - FREQUENCY
FREQUENCY: CONTINUOUS
FREQUENCY: CONTINUOUS

## 2022-01-29 ASSESSMENT — PAIN DESCRIPTION - ONSET
ONSET: ON-GOING
ONSET: ON-GOING

## 2022-01-29 ASSESSMENT — PAIN DESCRIPTION - PAIN TYPE
TYPE: SURGICAL PAIN
TYPE: ACUTE PAIN

## 2022-01-29 ASSESSMENT — PAIN DESCRIPTION - ORIENTATION
ORIENTATION: RIGHT
ORIENTATION: RIGHT

## 2022-01-29 NOTE — PROGRESS NOTES
Physical Therapy    Facility/Department: [address] ORTHO/MED SURG  Re Assessment d/t change in WB status    NAME: Keara Forman  : 1965  MRN: [id number]    Date of Service: 2022    Discharge Recommendations:  Further therapy recommended at discharge. The patient should be able to tolerate at least 3 hours of therapy per day over 5 days or 15 hours over 7 days. This patient may benefit from a Physical Medicine and Rehab consult. PT Equipment Recommendations  Equipment Needed: No (CTA with progress)    Assessment   Body structures, Functions, Activity limitations: Decreased functional mobility ; Decreased strength;Decreased endurance; Increased pain  Assessment: Pt amb 1ft +2 ft RW and MOD A. distance limites d/t pt unable to consistently maintain NWB RLE, and d/t high pain levels. Unsafe to return to prior living arrangmetns at this time, recommend aggressvie PT after d/c to address deficits  Prognosis: Good  Decision Making: Medium Complexity  PT Education: Goals;PT Role;Plan of Care  Barriers to Learning: impulsive, dec safe awareness  REQUIRES PT FOLLOW UP: Yes  Activity Tolerance  Activity Tolerance: Patient limited by fatigue;Patient limited by pain       Patient Diagnosis(es): The primary encounter diagnosis was Right foot infection. A diagnosis of Elevated d-dimer was also pertinent to this visit.      has a past medical history of Acid reflux, Acute cystitis with hematuria, Acute non-recurrent maxillary sinusitis, Asthma, Bipolar 1 disorder ([address] Bipolar disorder, mixed ([address] BMI 34.0-34.9,adult, Cannabis use disorder, severe, dependence ([address] Cerebrovascular accident (CVA) ([address] Chest pain, Chronic renal insufficiency, Cocaine abuse ([address] COVID-19 virus RNA test result unknown, DDD (degenerative disc disease), cervical, Diabetes mellitus ([address] Dizziness, Fibromyalgia, History of stroke, Homicidal ideation, Hyperglycemia, Hypertension, Hypotension, IDDM (insulin dependent diabetes mellitus), Lupus ([facility] [address] Migraine, Neuropathy, Neuropathy, Polysubstance abuse ([facility] [address] Post traumatic stress disorder (PTSD), Posttraumatic stress disorder, Recurrent depression ([address] Recurrent major depressive disorder, in partial remission ([address] Screening mammogram, encounter for, Severe recurrent major depression with psychotic features ([facility] [address] Severe recurrent major depression without psychotic features ([facility] [address] Stroke (cerebrum) ([facility] [address] Stroke ([facility] [address] Suicidal ideation, Suicidal intent, Vitamin D deficiency, and White matter changes. has a past surgical history that includes Abscess Drainage; chest tube insertion; Abdomen surgery; Cataract removal with implant (Bilateral); LASIK (Bilateral); Finger amputation (Left, 03/13/2021); Hand surgery (Right, 09/14/2021); Hand surgery (Right, 09/15/2021); Finger amputation (Right, 10/11/2021); Foot surgery (Right, 01/27/2022); and Foot Debridement (Right, 1/27/2022). Restrictions  Restrictions/Precautions  Restrictions/Precautions: Weight Bearing,Up as Tolerated  Required Braces or Orthoses?: Yes (Sx shoe)  Lower Extremity Weight Bearing Restrictions  Right Lower Extremity Weight Bearing: Non Weight Bearing  Left Lower Extremity Weight Bearing: Weight Bearing As Tolerated  Upper Extremity Weight Bearing Restrictions  Other: Non weight bearing to right foot. WBAT to left foot with Sx shoe per podiatry note 1/28/22  Vision/Hearing  Vision: Impaired  Vision Exceptions: Wears glasses at all times  Hearing: Within functional limits     Subjective  General  Patient assessed for rehabilitation services?: Yes  Response To Previous Treatment: Not applicable  Family / Caregiver Present: No  Follows Commands: Within Functional Limits  Subjective  Subjective: RN and pt agreeable to PT. pt agreeable, very high pain levels post mobilization. Pt supine in bed at start of session. Surgical shoe donned prior to mobility.   Pain Screening  Patient Currently in Pain: Yes  Pain Assessment  Pain Assessment: Faces  Pain Level: 10  Pain Type: Acute pain  Pain Location: Foot  Pain Orientation: Right  Pain Descriptors: Aching;Discomfort  Pain Frequency: Continuous  Pain Onset: On-going  Non-Pharmaceutical Pain Intervention(s): Repositioned; Ambulation/Increased Activity  Response to Pain Intervention: Patient Satisfied  Vital Signs  Patient Currently in Pain: Yes       Orientation  Orientation  Overall Orientation Status: Within Normal Limits  Social/Functional History  Social/Functional History  Lives With: Alone  Type of Home: Apartment (4th floor)  Home Layout: One level  Home Access: Elevator,Ramped entrance  Bathroom Shower/Tub: Tub/Shower unit  Bathroom Toilet: Standard  Bathroom Equipment: Shower chair,Grab bars in shower  Home Equipment: Quad cane,Rolling walker  ADL Assistance: [address] Responsibilities: Yes  Meal Prep Responsibility: Primary  Laundry Responsibility: Primary (laundry is outside of complex)  Cleaning Responsibility: Primary  Shopping Responsibility: Primary  Ambulation Assistance: Independent  Transfer Assistance: Independent  Active : No  Patient's  Info: Friends and cabs  Mode of Transportation: Cab,Friends,Other (uber)  Occupation: On disability  Leisure & Hobbies: Stuff  Additional Comments: Obtained from initial evaluation, confirmed with pt. Cognition   Cognition  Overall Cognitive Status: Exceptions  Safety Judgement: Decreased awareness of need for assistance;Decreased awareness of need for safety  Insights: Fully aware of deficits  Initiation: Does not require cues  Sequencing: Requires cues for some  Cognition Comment: Pt impulsive, standing up when PT was not near pt and not ready. Pt requires education on WB status, however fair return with maintenance of NWB RLE. Objective          Joint Mobility  Spine: WFL  ROM RLE: WFL, except ankle not tested d/t wrapping.   ROM LLE: WFL  ROM RUE: WFL  ROM LUE: WFL  Strength RLE  Strength RLE: Exception  Comment: at least 3/5, not formally tested d/t NWB. Ankle not tested d/t wrapping in place. Strength LLE  Strength LLE: Exception  L Hip Flexion: 4/5  L Knee Flexion: 4+/5  L Knee Extension: 4+/5  L Ankle Dorsiflexion: 4+/5  L Ankle Plantar Flexion: 4+/5  Strength RUE  Strength RUE: WFL  Strength LUE  Strength LUE: WFL     Sensation  Overall Sensation Status: Impaired (Reports numbness/tingling in B feet)  Bed mobility  Supine to Sit: Contact guard assistance  Sit to Supine: Minimal assistance (for RLE)  Scooting: Contact guard assistance  Comment: HOB slightly elevated  Transfers  Sit to Stand: Moderate Assistance  Stand to sit: Moderate Assistance  Comment: RW for UE support, poor return of cues for hand placement., Pt with poor return of WB restrictions upon first transfer despite maximal education/cues. Second transfer improved maintenance of NWB RLE, but only fair return. May benefit from second person for assistance, or to keep RLE off ground. Ambulation  Ambulation?: Yes  Ambulation 1  Surface: level tile  Device: Rolling Walker  Assistance: Moderate assistance  Gait Deviations: Shuffles  Distance: 1 ft + 2 ft  Comments: Pt with fair return of WB restrictions during ambulation, able to hop but with increased difficulty on LLE and fair maintenance of NWB RLE. Pt with severe RLE pain post ambulation, RN notified.   Stairs/Curb  Stairs?: No     Balance  Posture: Good  Sitting - Static: Good  Sitting - Dynamic: Good  Standing - Static: Poor  Standing - Dynamic: Poor  Comments: standing with RW        Plan   Plan  Times per week: 5-6x wk  Times per day: Daily  Current Treatment Recommendations: Strengthening,Functional Mobility Training,Gait Training,Safety Education & Robert Serrano Exercise Program,Neuromuscular Re-education,Transfer Training,Patient/Caregiver Education & Training,ADL/Self-care Training  Safety Devices  Type of devices:  All fall risk precautions in place,Gait belt,Call light within reach,Left in bed,Bed alarm in place,Nurse notified  Restraints  Initially in place: No                                                 AM-PAC Score  AM-PAC Inpatient Mobility Raw Score : 12 (01/29/22 1404)  AM-PAC Inpatient T-Scale Score : 35.33 (01/29/22 1404)  Mobility Inpatient CMS 0-100% Score: 68.66 (01/29/22 1404)  Mobility Inpatient CMS G-Code Modifier : CL (01/29/22 1404)          Goals  Short term goals  Time Frame for Short term goals: 14 visits  Short term goal 1: transfers with SBA and RW  Short term goal 2: amb 75 ft with a RW x SBA with NWB RLE, WBAT LLE with sx shoe  Short term goal 3: to be independent with bed mobility  Short term goal 4: 20 min exercise program x SBA  Patient Goals   Patient goals : Return home       Therapy Time   Individual Concurrent Group Co-treatment   Time In 1119         Time Out 1149         Minutes 30         Timed Code Treatment Minutes: 2017 Tramaine Jc, PT

## 2022-01-29 NOTE — PROGRESS NOTES
Physical Therapy        Physical Therapy Cancel Note      DATE: 2022    NAME: Davy Box  MRN: [id number]   : 1965      Patient not seen this date for Physical Therapy due to: Other: pt with pt care getting washed up upon PT entrance.  Will ck back as able      Electronically signed by Salima Nuñez PT on 2022 at 9:21 AM

## 2022-01-29 NOTE — PROGRESS NOTES
[facility] -   Infectious diseases evaluation  admission date 1/22/2022    reason for consultation:   Diabetic foot infection    Impression :   Current:  · Right diabetic foot infection, GAS  · Cellulitis both feet  · bandemia  · Bacteriuria vs UTI kleb S ancef    Other:  ·   Discussion / summary of stay / plan of care   ·   Recommendations   · clindamycin iv - foot cx MSSA R clinda  and GAS-   · Switch to ancef  · anticipate DC ceftriaxone at the infusion center or in the facility - till 2/14, followed by po keflex   · FU office in 2 weeks  · UC 1/24 : Klebsiella- no dysuria - but lower abd pain - on keflex till 1/29 completed    Infection Control Recommendations   · Buckley Precautions  · Contact Isolation       Antimicrobial Stewardship Recommendations   · Simplification of therapy  · Targeted therapy  Coordination ofOutpatient Care:   · Estimated Length of IV antimicrobials:  · Patient will need Midline / picc Catheter Insertion:   · Patient will need SNF:  · Patient will need outpatient wound care:     History of Present Illness:   Initial history:  Sarath Suh is a 49y.o.-year-old female with diabetes very well-known to my service, stepped on a piece of glass a few days prior to admission presented with swelling and drainage from right foot, culture came back with group A strep,  Patient has neuropathy from diabetes  WBC elevated 14,  sed [id number],     Podiatry debrided the superficial ulcer and felt it was not too deep yet there was cellulitis of both feet. Hemoglobin A1c was 10    MRI of the left foot no osteomyelitis  MRI of the right foot no osteomyelitis    Leukocytosis responded to antibiotics, infectious consulted for antibiotic management  Patient is on cefepime and clindamycin            R foot very swollen and tender to light touch - suspect deeper involvement and needs more I/D    1/26  Pt is afebrile and stable this morning.  WBC trending up (14 today). UC 1/24 growing Klebsiella. CT right leg 1/25 shows an abscess  Planned for I/D on 1/27  Pain still ++    1/27  Pt seen and examined at bedside, afebrile and hypotensive this morning. Endorsed a lot of pain overnight. WBC improved to 11   To go to OR today for I+D of R foot abscess. Started on Keflex until 1/29 for UTI  Will monitor on antibiotics and await podiatry recs    1/28  Pt afebrile and stable s/p I+D right foot yesterday. Pt c/o pain this morning but is managing. Pt also endorsing abdominal discomfort. XR Abd showing increased stool burden. WBC did increase to 15 from 11 yesterday. Wound Cx 1/27: pending but GPC in pairs seen. Will monitor on Keflex and Clinda    1/29  No fever  Lab: MSSA in wound , but clinda is R  Switching to ceftriaxone  Foot better - less edema and drainage -tender still    Interval changes  1/29/2022     /72   Pulse 60   Temp 97.9 °F (36.6 °C) (Oral)   Resp 22   Ht 5' 6\" (1.676 m)   Wt 251 lb 15.8 oz (114.3 kg)   LMP  (LMP Unknown)   SpO2 100%   BMI 40.67 kg/m²    Summary of relevant labs:  Labs:  W14 > 11>15  [id number]    Micro:  cx foot pus GAS  Uc 1/24: Klebsiella  Wound Cx 1/27: GPC pairs - cx pend    Imaging:  XR abd 1/27: Increased stool burden seen diffusely throughout the colon suggesting   clinical presentation of constipation.  Phleboliths seen on both sides of the   pelvis. CT R foot 1/25:  1. Shallow soft tissue ulceration plantar and medial to the 1st metatarsal   base with an underlying area of possible abscess versus phlegmon measuring   2.8 x 1.1 x 0.9 cm.   2. Extensive subcutaneous fat stranding compatible with cellulitis.  No soft   tissue gas. 3. No evidence of osteomyelitis or other acute osseous abnormality.        U/S renal 1/25: Unremarkable    MRI R foot 1/23:  Subcutaneous edema and swelling, most notably affecting the dorsum of the   foot, which may reflect underlying cellulitis.  No discrete organized fluid   collection is seen within the limitations of a noncontrast study.       No imaging features of acute osteomyelitis appreciated. MRI L foot: 1/23:   Limited evaluation due to patient motion artifact on several sequences.       Skin thickening and subcutaneous edema seen within the plantar soft tissues   of the foot at the level of the proximal metatarsals, which may reflect   underlying cellulitis.  Within the limitations of a noncontrast study, no   discrete organized fluid collection is seen.       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 I have personally reviewed the past medical history, past surgical history, medications, social history, and family history, and I haveupdated the database accordingly. Allergies:   Bactrim [sulfamethoxazole-trimethoprim] and Adhesive tape     Review of Systems:     Review of Systems   Constitutional: Negative for activity change and diaphoresis. HENT: Negative for congestion. Eyes: Negative for photophobia and discharge. Respiratory: Negative for apnea and cough. Cardiovascular: Negative for chest pain. Gastrointestinal: Negative for abdominal distention. Endocrine: Negative for cold intolerance. Genitourinary: Negative for dysuria and flank pain. Musculoskeletal: Negative for arthralgias. Skin: Positive for color change and wound. Allergic/Immunologic: Negative for immunocompromised state. Neurological: Negative for dizziness, tremors, seizures, syncope, light-headedness, numbness and headaches. Hematological: Negative for adenopathy. Psychiatric/Behavioral: Negative for agitation. Physical Examination :       Physical Exam  Constitutional:       General: She is not in acute distress. Appearance: Normal appearance. She is not ill-appearing, toxic-appearing or diaphoretic. HENT:      Head: Normocephalic and atraumatic. 9/17/2017    Post traumatic stress disorder (PTSD)     Posttraumatic stress disorder 5/29/2017    Recurrent depression ([facility] [address] 5/29/2017    Recurrent major depressive disorder, in partial remission ([address] 11/28/2017    Screening mammogram, encounter for 11/28/2017    Severe recurrent major depression with psychotic features ([address] 12/19/2017    Severe recurrent major depression without psychotic features ([address] 12/19/2017    Stroke (cerebrum) ([facility] [address] 6/14/2017    Stroke ([facility] [address]     per chart notes    Suicidal ideation     Suicidal intent 3/10/2017    Vitamin D deficiency 11/28/2017    White matter changes 6/13/2017       Past Surgical  History:     Past Surgical History:   Procedure Laterality Date    ABDOMEN SURGERY      drain tube    ABSCESS DRAINAGE      right buttock    CATARACT REMOVAL WITH IMPLANT Bilateral     CHEST TUBE INSERTION      FINGER AMPUTATION Left 03/13/2021    LEFT RING FINER AMPUTATION performed by Sharon House MD at [facility] Right 10/11/2021    AMPUTATION RIGHT INDEX FINGER performed by Sharon House MD at [address] [place] [address] Right 1/27/2022    FOOT ABSCESS INCISION AND DRAINAGE  (PULSE LAVAGE, CULTURE SWABS) performed by Anton Bustamante DPM at [address] Right 01/27/2022    FOOT ABSCESS INCISION AND DRAINAGE (PULSE LAVAGE, CULTURE SWABS) - Right    HAND SURGERY Right 09/14/2021    I&D INDEX FINGER performed by Sharon House MD at [address] [place] [address] Right 09/15/2021    I&D INDEX FINGER performed by Sharon House MD at [address] LASIK Bilateral        Medications:      insulin glargine  25 Units SubCUTAneous BID    clindamycin (CLEOCIN) IV  600 mg IntraVENous Q8H    cephALEXin  500 mg Oral 4 times per day    enoxaparin  30 mg SubCUTAneous BID    budesonide-formoterol  2 puff Inhalation BID    dicyclomine  10 mg Oral 4x Daily    fluticasone  1 spray Each Nostril Daily    mirtazapine  7.5 mg Oral Nightly  pantoprazole  20 mg Oral BID AC    pregabalin  200 mg Oral BID    traZODone  150 mg Oral Nightly    venlafaxine  150 mg Oral Daily with breakfast    sodium chloride flush  5-40 mL IntraVENous 2 times per day    insulin lispro  0-12 Units SubCUTAneous TID WC    insulin lispro  0-6 Units SubCUTAneous Nightly       Social History:     Social History     Socioeconomic History    Marital status: Legally      Spouse name: Not on file    Number of children: Not on file    Years of education: Not on file    Highest education level: Not on file   Occupational History    Not on file   Tobacco Use    Smoking status: Never Smoker    Smokeless tobacco: Never Used   Vaping Use    Vaping Use: Never used   Substance and Sexual Activity    Alcohol use: Not Currently    Drug use: Yes     Types: Marijuana ([facility] [place]), Cocaine     Comment: + Cocaine 2/2021 also, see Care Everywhere UDS    Sexual activity: Not Currently     Partners: Male   Other Topics Concern    Not on file   Social History Narrative    Not on file     Social Determinants of Health     Financial Resource Strain: High Risk    Difficulty of Paying Living Expenses: Hard   Food Insecurity: Food Insecurity Present    Worried About Running Out of Food in the Last Year: Often true    Kelin of Food in the Last Year: Often true   Transportation Needs: Unmet Transportation Needs    Lack of Transportation (Medical):  Yes    Lack of Transportation (Non-Medical): Yes   Physical Activity: Inactive    Days of Exercise per Week: 0 days    Minutes of Exercise per Session: 0 min   Stress: Stress Concern Present    Feeling of Stress : Rather much   Social Connections:     Frequency of Communication with Friends and Family: Not on file    Frequency of Social Gatherings with Friends and Family: Not on file    Attends [employer] Services: Not on file    Active Member of Clubs or Organizations: Not on file    Attends Club or Organization Meetings: Not on file    Marital Status: Not on file   Intimate Partner Violence:     Fear of Current or Ex-Partner: Not on file    Emotionally Abused: Not on file    Physically Abused: Not on file    Sexually Abused: Not on file   Housing Stability: High Risk    Unable to Pay for Housing in the Last Year: Yes    Number of Places Lived in the Last Year: 2    Unstable Housing in the Last Year: Yes       Family History:     Family History   Problem Relation Age of Onset    Diabetes Mother     Stroke Mother     Diabetes Father     Diabetes Sister     Diabetes Brother     Other Son         GSW    No Known Problems Sister       Medical Decision Making:   I have independently reviewed/ordered the following labs:    CBC with Differential:   Recent Labs     01/28/22  0342 01/29/22  0612   WBC 14.9* 7.8   HGB 7.7* 7.6*   HCT 23.6* 24.3*    378   LYMPHOPCT 10* 44   MONOPCT 8 10*     BMP:  Recent Labs     01/28/22  0342 01/29/22  0612   * 139   K 5.1 4.5    107   CO2 20 23   BUN 14 15   CREATININE 0.84 0.72     Hepatic Function Panel:   No results for input(s): PROT, LABALBU, BILIDIR, IBILI, BILITOT, ALKPHOS, ALT, AST in the last 72 hours. No results for input(s): RPR in the last 72 hours. No results for input(s): HIV in the last 72 hours. No results for input(s): BC in the last 72 hours. Lab Results   Component Value Date    CREATININE 0.72 01/29/2022    GLUCOSE 120 01/29/2022       Detailed results: Thank you for allowing us to participate in the care of this patient. Please call with questions. This note is created with the assistance of a speech recognition program.  While intending to generate adocument that actually reflects the content of the visit, the document can still have some errors including those of syntax and sound a like substitutions which may escape proof reading. It such instances, actual meaningcan be extrapolated by contextual diversion.         Maylin Puga, Infectious Diseases

## 2022-01-29 NOTE — PROGRESS NOTES
[facility]  Progress Note    Date:   1/29/2022  Patient name:  Faith Stroud  Date of admission:  1/22/2022  4:43 PM  MRN:   [id number]  YOB: 1965    SUBJECTIVE/Last 24 hours update:     Day 2 Hospitalization:   Patient was seen and examined at the bedside. No acute events overnight. Continue on keflex for klebsiella UTI. Switched IV clindamycin to IV Ancef  for cellulitis. Afebrile. VS stable. Pain controlled with pain meds. Denies chills, chest pain, SOB, nausea, vomiting. Hb 7.6 stable, will continue to monitor. REVIEW OF SYSTEMS:      Review of Systems   Constitutional: Negative for activity change, appetite change, chills, fatigue and fever. HENT: Negative. Respiratory: Negative for cough, chest tightness and shortness of breath. Cardiovascular: Negative for chest pain, palpitations and leg swelling. Gastrointestinal: Negative for abdominal pain, constipation, diarrhea, nausea and vomiting. Genitourinary: Negative for decreased urine volume, difficulty urinating, dysuria, frequency and hematuria. Musculoskeletal: Negative for arthralgias, back pain and neck pain. Skin: Negative for color change. Neurological: Negative for dizziness, weakness, light-headedness, numbness and headaches.        PAST MEDICAL HISTORY:      has a past medical history of Acid reflux, Acute cystitis with hematuria, Acute non-recurrent maxillary sinusitis, Asthma, Bipolar 1 disorder ([address] Bipolar disorder, mixed ([address] BMI 34.0-34.9,adult, Cannabis use disorder, severe, dependence ([address] Cerebrovascular accident (CVA) ([address] Chest pain, Chronic renal insufficiency, Cocaine abuse ([address] COVID-19 virus RNA test result unknown, DDD (degenerative disc disease), cervical, Diabetes mellitus ([address] Dizziness, Fibromyalgia, History of stroke, Homicidal ideation, Hyperglycemia, Hypertension, Hypotension, IDDM (insulin dependent diabetes mellitus), Lupus ([facility] Migraine, Neuropathy, Neuropathy, Polysubstance abuse ([facility] [address] [address] Post traumatic stress disorder (PTSD), Posttraumatic stress disorder, Recurrent depression ([address] Recurrent major depressive disorder, in partial remission ([facility] [address] Screening mammogram, encounter for, Severe recurrent major depression with psychotic features ([facility] [address] Severe recurrent major depression without psychotic features ([address] Stroke (cerebrum) ([facility] [address] Stroke ([facility] [address] [address] Suicidal ideation, Suicidal intent, Vitamin D deficiency, and White matter changes. PAST SURGICAL HISTORY:      has a past surgical history that includes Abscess Drainage; chest tube insertion; Abdomen surgery; Cataract removal with implant (Bilateral); LASIK (Bilateral); Finger amputation (Left, 03/13/2021); Hand surgery (Right, 09/14/2021); Hand surgery (Right, 09/15/2021); Finger amputation (Right, 10/11/2021); Foot surgery (Right, 01/27/2022); and Foot Debridement (Right, 1/27/2022). SOCIALHISTORY:      reports that she has never smoked. She has never used smokeless tobacco. She reports previous alcohol use. She reports current drug use. Drugs: Marijuana (Weed) and Cocaine. FAMILY HISTORY:      family history includes Diabetes in her brother, father, mother, and sister; No Known Problems in her sister; Other in her son; Stroke in her mother. HOME MEDICATIONS:      Prior to Admission medications    Medication Sig Start Date End Date Taking? Authorizing Provider   pregabalin (LYRICA) 200 MG capsule Take 1 capsule by mouth 2 times daily for 30 days.  1/20/22 2/19/22  Jackie Markham MD   insulin glargine (LANTUS SOLOSTAR) 100 UNIT/ML injection pen Inject 30 Units into the skin 2 times daily 1/20/22   Jackie Markham MD   fluticasone [facility]) 50 MCG/ACT nasal spray 1 spray by Each Nostril route daily 12/22/21   Vicky Nicole MD   acetaminophen (TYLENOL) 500 MG tablet Take 2 tablets by mouth 3 times daily as needed for Pain 12/22/21   Puma Dirk Lamb MD   dicyclomine (BENTYL) 10 MG capsule Take 1 capsule by mouth 4 times daily 12/17/21   Bigg Esquivel MD   metFORMIN (GLUCOPHAGE) 1000 MG tablet Take 1 tablet by mouth 2 times daily (with meals) 12/17/21   Bigg Esquivel MD   mirtazapine (REMERON) 7.5 MG tablet Take 1 tablet by mouth nightly 12/17/21   Bigg Esquivel MD   pantoprazole (PROTONIX) 20 MG tablet Take 1 tablet by mouth 2 times daily (before meals) 12/17/21   Bigg Esquivel MD   traZODone (DESYREL) 150 MG tablet Take 1 tablet by mouth nightly 12/17/21   Bigg Esquivel MD   venlafaxine (EFFEXOR XR) 150 MG extended release capsule Take 1 capsule by mouth daily (with breakfast) 12/17/21   Bigg Esquivel MD   ibuprofen (ADVIL;MOTRIN) 800 MG tablet Take 1 tablet by mouth every 8 hours as needed for Pain 11/15/21 11/29/21  Rebeka Frias MD   Lancets MISC 1 each by Does not apply route 3 times daily 11/15/21   Rebeka Frias MD   Alcohol Swabs 70 % PADS Use 1 swab 3 times daily as needed 11/15/21   Rebeka Frias MD   blood glucose monitor strips Test 3 times a day & as needed for symptoms of irregular blood glucose. Dispense sufficient amount for indicated testing frequency plus additional to accommodate PRN testing needs.  11/8/21   Daniel Zhang MD   Lancets MISC 1 each by Does not apply route 3 times daily 11/8/21   Daniel Zhang MD   Insulin Pen Needle ([employer] PEN NEEDLES 31G) 31G X 8 MM MISC 1 each by Does not apply route daily 11/8/21   Brittanie Felix MD   FREESTYLE LITE strip 1 each by Does not apply route 3 times daily 11/11/20   Bigg Esquivel MD   albuterol sulfate  (90 Base) MCG/ACT inhaler Inhale 2 puffs into the lungs every 4 hours as needed for Wheezing 10/20/20 9/22/21  Bigg Esquivel MD   FLOVENT  MCG/ACT inhaler Inhale 2 puffs into the lungs 2 times daily 10/20/20   Bigg Esquivel MD   budesonide-formoterol (SYMBICORT) 80-4.5 MCG/ACT AERO Inhale 2 puffs into the lungs 2 times daily 10/20/20 Vicky Nicole MD       ALLERGIES:     Bactrim [sulfamethoxazole-trimethoprim] and Adhesive tape    OBJECTIVE:       Vitals:    01/28/22 0815 01/28/22 1130 01/28/22 1539 01/28/22 2021   BP: 119/68 117/72  106/72   Pulse: 86 84  60   Resp: 16 16 22   Temp: 98 °F (36.7 °C) 97.8 °F (36.6 °C)  97.9 °F (36.6 °C)   TempSrc: Oral Oral  Oral   SpO2: 96% 97%  100%   Weight:       Height:   5' 6\" (1.676 m)          Intake/Output Summary (Last 24 hours) at 1/29/2022 0731  Last data filed at 1/29/2022 0515  Gross per 24 hour   Intake --   Output 1600 ml   Net -1600 ml       PHYSICAL EXAM:    Physical Exam  Vitals and nursing note reviewed. Constitutional:       General: She is not in acute distress. Appearance: Normal appearance. She is not ill-appearing. HENT:      Head: Normocephalic and atraumatic. Mouth/Throat:      Pharynx: Oropharynx is clear. Cardiovascular:      Rate and Rhythm: Normal rate and regular rhythm. Pulses: Normal pulses. Heart sounds: No murmur heard. Pulmonary:      Effort: Pulmonary effort is normal.      Breath sounds: Normal breath sounds. Abdominal:      Palpations: Abdomen is soft. There is no mass. Tenderness: There is no abdominal tenderness. Musculoskeletal:         General: No tenderness. Normal range of motion. Cervical back: Normal range of motion and neck supple. Right lower leg: Edema present. Left lower leg: Edema present. Neurological:      General: No focal deficit present. Mental Status: She is alert and oriented to person, place, and time. ASSESSMENT:      Active Problems:    Type 2 diabetes mellitus without complication, with long-term current use of insulin (HCC)    Right foot infection    Cellulitis and abscess of toe of right foot    Abscess of right foot    Bilateral cellulitis of lower leg related to related to  uncontrolled diabetes  Resolved Problems:    * No resolved hospital problems.  *      PLAN:        RLJAMES foot wound/infection  - Tmax 100.4  - leukocytosis, elevated CRP  - X-ray revealed no foreign body in foot. However moderate right foot swelling.  - S/P I&D of right foot and left foot debridement. -S/P Right foot debridment   - Switch IV clindamycin to IV ancef  - pain control  -  ml/hr  - podiatry consulted, follow up recommendations  -ID consulted- appreciate recs.   - elevated D-dime  -Venous doppler shows chronic DVT  -Continue DVT prophylaxis, lovenos      UTI   Cx: klebsiella  S/P Cefepime  Acef 2g Q8hrs    DM2  - A1C 10  -POCT glucose  -on lantus 25U BID  - medium dose ISS  - hypoglycemia protocol     Bipolar disorder  - stable  - continue remeron 7.5 mg PO nightly  - effexor  mg po daily  - trazadone 150 mg PO nightly    Plan will be discussed with the attending, Nathaly Veliz MD  Family Medicine Resident  1/29/2022 7:31 AM

## 2022-01-29 NOTE — PROGRESS NOTES
Progress Note  Podiatric Medicine and Surgery     Subjective     CC: right foot pain    Patient seen and examined at bedside. S/p I&D right foot (DOS:1/23/22)   No acute events overnight. Some mild pain to RLE. HPI :  Priya Luong is a 49 y.o. female seen at [facility] for right foot pain. Patient stepped on a piece of glass a few days ago. She went to her PCP when PCP removed the glass. She presented to the ED last night due to increased pain and swelling to the right lower extremity. Upon arrival, X-ray was taken which showed no foreign body, acute osseous deformity or soft tissue emphysema. During my evaluation, patient also mentioned pain on her plantar left foot and  pain is not as bad as her right foot. Per chart review, patient visited [facility] podiatry clinic a year ago for diabetic foot care but never follows up after that. 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 Her last A1C in 12/13/2021 is 13.3%. She admits neuropathic pain to bilateral lower extremity. She denies other pedal complaints. ROS: Denies N/V/F/C/SOB/CP. Otherwise negative except at stated in the HPI.      Medications:  Scheduled Meds:   insulin glargine  25 Units SubCUTAneous BID    clindamycin (CLEOCIN) IV  600 mg IntraVENous Q8H    cephALEXin  500 mg Oral 4 times per day    enoxaparin  30 mg SubCUTAneous BID    budesonide-formoterol  2 puff Inhalation BID    dicyclomine  10 mg Oral 4x Daily    fluticasone  1 spray Each Nostril Daily    mirtazapine  7.5 mg Oral Nightly    pantoprazole  20 mg Oral BID AC    pregabalin  200 mg Oral BID    traZODone  150 mg Oral Nightly    venlafaxine  150 mg Oral Daily with breakfast    sodium chloride flush  5-40 mL IntraVENous 2 times per day    insulin lispro  0-12 Units SubCUTAneous TID WC    insulin lispro  0-6 Units SubCUTAneous Nightly       Continuous Infusions:   sodium chloride      sodium chloride 100 mL/hr at 01/25/22 1153    dextrose         PRN Meds:simethicone, diphenhydrAMINE, albuterol sulfate HFA, oxyCODONE, sodium chloride flush, sodium chloride, ondansetron **OR** ondansetron, polyethylene glycol, acetaminophen **OR** acetaminophen, potassium chloride **OR** potassium alternative oral replacement **OR** potassium chloride, glucose, dextrose, glucagon (rDNA), dextrose    Objective     Vitals:  No data found. Average, Min, and Max for last 24 hours Vitals:  TEMPERATURE:  Temp  Av.9 °F (36.6 °C)  Min: 97.8 °F (36.6 °C)  Max: 98 °F (36.7 °C)    RESPIRATIONS RANGE: Resp  Av  Min: 16  Max: 22    PULSE RANGE: Pulse  Av.7  Min: 60  Max: 86    BLOOD PRESSURE RANGE:  Systolic (09LNT), KOQ:490 , Min:106 , PPH:637   ; Diastolic (01CQA), JKN:44, Min:68, Max:72      PULSE OXIMETRY RANGE: SpO2  Av.7 %  Min: 96 %  Max: 100 %    I/O last 3 completed shifts:  In: -   Out: 2400 [Urine:2400]    CBC:  Recent Labs     22  0619 22  0342 22  0612   WBC 10.9 14.9* 7.8   HGB 7.7* 7.7* 7.6*   HCT 25.3* 23.6* 24.3*    392 378        BMP:  Recent Labs     22  0619 22  0342 22  0612    132* 139   K 4.4 5.1 4.5   * 101 107   CO2 20 20 23   BUN 16 14 15   CREATININE 0.97* 0.84 0.72   GLUCOSE 83 448* 120*   CALCIUM 8.7 9.0 8.9        Coags:  No results for input(s): APTT, PROT, INR in the last 72 hours. Lab Results   Component Value Date    SEDRATE 124 (H) 2022     No results for input(s): CRP in the last 72 hours. Lower Extremity Physical Exam:   Vascular: DP and PT pulses are palpable. CFT <3 seconds to all digits. Hair growth is absent to the level of the digits.        Neuro: Saph/sural/SP/DP/plantar sensation diminished to light touch.     Musculoskeletal: Muscle strength is 5/5 to all lower extremity muscle groups. Gross deformity is status post left 4th and 5th digit amputation.  Tenderness to palpation to previous entry point of injury on the right foot and hyperkeratotic tissue on the left foot.      Dermatologic:     Diffuse edema noted throughout the right lower extremity without associated increase in warmth. Right foot: Incision is coapted. Tamar-incision skin is soft and macerated. No fluctuance. Induration noted. Some serous drainage appreciated. Left foot: Full thickness ulcer #2 located lateral plantar left foot and measures approximately 1cmx 1cmx0.2cm. Base is fibrotic. Does not probe to bone, sinus track, or undermine. No fluctuance, crepitus, or induration. Interdigital maceration absent. Clinical Images:              Imaging:   XR ABDOMEN (KUB) (SINGLE AP VIEW)   Final Result   Increased stool burden seen diffusely throughout the colon suggesting   clinical presentation of constipation. Phleboliths seen on both sides of the   pelvis. CT FOOT RIGHT W CONTRAST   Final Result   1. Shallow soft tissue ulceration plantar and medial to the 1st metatarsal   base with an underlying area of possible abscess versus phlegmon measuring   2.8 x 1.1 x 0.9 cm.   2. Extensive subcutaneous fat stranding compatible with cellulitis. No soft   tissue gas. 3. No evidence of osteomyelitis or other acute osseous abnormality. US RENAL COMPLETE   Final Result   Unremarkable renal ultrasound. MRI FOOT RIGHT WO CONTRAST   Final Result   Subcutaneous edema and swelling, most notably affecting the dorsum of the   foot, which may reflect underlying cellulitis. No discrete organized fluid   collection is seen within the limitations of a noncontrast study. No imaging features of acute osteomyelitis appreciated. MRI FOOT LEFT WO CONTRAST   Final Result   Limited evaluation due to patient motion artifact on several sequences. Skin thickening and subcutaneous edema seen within the plantar soft tissues   of the foot at the level of the proximal metatarsals, which may reflect   underlying cellulitis.   Within the limitations of a noncontrast study, no   discrete organized fluid collection is seen. 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 VL DUP LOWER EXTREMITY VENOUS BILATERAL   Final Result      XR FOOT RIGHT (MIN 3 VIEWS)   Final Result   Right foot: No radiopaque foreign body. No fracture. Moderate soft tissue   swelling within the anterior aspect of plantar aspect of the foot   predominantly underlying meta tarsal heads. Left foot: Changes related to prior partial amputation of 4th and 5th ray as   described. No acute fracture. No radiopaque foreign body. Moderate soft   tissue swelling and plantar aspect of the foot. No discrete soft tissue ulcer   is noted. XR FOOT LEFT (MIN 3 VIEWS)   Final Result   Right foot: No radiopaque foreign body. No fracture. Moderate soft tissue   swelling within the anterior aspect of plantar aspect of the foot   predominantly underlying meta tarsal heads. Left foot: Changes related to prior partial amputation of 4th and 5th ray as   described. No acute fracture. No radiopaque foreign body. Moderate soft   tissue swelling and plantar aspect of the foot. No discrete soft tissue ulcer   is noted. Cultures: Group A strep. anaerobe contaminated. Intra -op culture: rare GPC in pairs    Lashae Fregoso is a 49 y.o. female with   1. Diabetic foot ulcer to subcutaneous layer, bilateral feet  2. Cellulitis, bilateral feet  3. Superficial abscess, bilateral feet  4. Poorly uncontrolled DM2 with peripheral neuropathy. 5. S/p bedside I&D right foot    Active Problems:    Type 2 diabetes mellitus without complication, with long-term current use of insulin (HCC)    Right foot infection    Cellulitis and abscess of toe of right foot    Abscess of right foot    Bilateral cellulitis of lower leg related to related to  uncontrolled diabetes  Resolved Problems:    * No resolved hospital problems.  *       Plan · Patient examined and evaluated at bedside   · Treatment options discussed in detail with the patient. · Antibiotic: Clindamycin and cefipime per ID  · Medical management per primary. · Dressing applied to right foot: silvercell, 4x4 gauze, kerlix, ace. · Weight bearing to heel of right foot. WBAT to left foot. · Patient is stable for discharge from podiatry perspective.   · Discussed with Dr. Mayela Hernandez DPM   Podiatric Medicine & Surgery   1/29/2022 at 7:25 AM

## 2022-01-29 NOTE — PLAN OF CARE
Problem: Pain:  Goal: Pain level will decrease  Description: Pain level will decrease  1/29/2022 1520 by Royce Tinoco RN  Outcome: Ongoing  1/29/2022 0526 by Royce Tinoco RN  Outcome: Ongoing  Goal: Control of acute pain  Description: Control of acute pain  1/29/2022 1520 by Royce Tinoco RN  Outcome: Ongoing  1/29/2022 0526 by Royce Tinoco RN  Outcome: Ongoing  Goal: Control of chronic pain  Description: Control of chronic pain  1/29/2022 1520 by Royce Tinoco RN  Outcome: Ongoing  1/29/2022 0526 by Royce Tinoco RN  Outcome: Ongoing     Problem: Falls - Risk of:  Goal: Will remain free from falls  Description: Will remain free from falls  1/29/2022 1520 by Royce Tinoco RN  Outcome: Ongoing  1/29/2022 0526 by Royce Tinoco RN  Outcome: Ongoing  Goal: Absence of physical injury  Description: Absence of physical injury  1/29/2022 1520 by Royce Tinoco RN  Outcome: Ongoing  1/29/2022 0526 by Royce Tinoco RN  Outcome: Ongoing     Problem: Musculor/Skeletal Functional Status  Goal: Highest potential functional level  1/29/2022 1520 by Royce Tinoco RN  Outcome: Ongoing  1/29/2022 0526 by Royce Tinoco RN  Outcome: Ongoing     Problem: Nutrition  Goal: Optimal nutrition therapy  1/29/2022 1520 by Royce Tinoco RN  Outcome: Ongoing  1/29/2022 0526 by Royce Tinoco RN  Outcome: Ongoing

## 2022-01-29 NOTE — PROGRESS NOTES
[facility]  Progress Note    Date:   1/29/2022  Patient name:  Faith Stroud  Date of admission:  1/22/2022  4:43 PM  MRN:   [id number]  YOB: 1965    SUBJECTIVE/Last 24 hours update:     Day 2 Hospitalization:   Patient was seen and examined at the bedside. No acute events overnight. Continue on keflex for klebsiella UTI. Continue IV clindamycin for cellulitis pending culture sensitivities. Afebrile. VS stable. Pain controlled with pain meds. Denies chills, chest pain, SOB, nausea, vomiting. REVIEW OF SYSTEMS:      Review of Systems   Constitutional: Negative for activity change, appetite change, chills, fatigue and fever. HENT: Negative. Respiratory: Negative for cough, chest tightness and shortness of breath. Cardiovascular: Negative for chest pain, palpitations and leg swelling. Gastrointestinal: Negative for abdominal pain, constipation, diarrhea, nausea and vomiting. Genitourinary: Negative for decreased urine volume, difficulty urinating, dysuria, frequency and hematuria. Musculoskeletal: Negative for arthralgias, back pain and neck pain. Skin: Negative for color change. Neurological: Negative for dizziness, weakness, light-headedness, numbness and headaches.        PAST MEDICAL HISTORY:      has a past medical history of Acid reflux, Acute cystitis with hematuria, Acute non-recurrent maxillary sinusitis, Asthma, Bipolar 1 disorder ([address] Bipolar disorder, mixed ([address] BMI 34.0-34.9,adult, Cannabis use disorder, severe, dependence ([address] Cerebrovascular accident (CVA) ([address] Chest pain, Chronic renal insufficiency, Cocaine abuse ([address] COVID-19 virus RNA test result unknown, DDD (degenerative disc disease), cervical, Diabetes mellitus ([address] Dizziness, Fibromyalgia, History of stroke, Homicidal ideation, Hyperglycemia, Hypertension, Hypotension, IDDM (insulin dependent diabetes mellitus), Lupus ([address] Migraine, Neuropathy, Neuropathy, Polysubstance abuse ([facility] [address] Post traumatic stress disorder (PTSD), Posttraumatic stress disorder, Recurrent depression ([facility] [address] Recurrent major depressive disorder, in partial remission ([facility] [address] Screening mammogram, encounter for, Severe recurrent major depression with psychotic features ([address] Severe recurrent major depression without psychotic features ([address] Stroke (cerebrum) ([address] [facility] [address] Stroke ([address] [facility] [address] Suicidal ideation, Suicidal intent, Vitamin D deficiency, and White matter changes. PAST SURGICAL HISTORY:      has a past surgical history that includes Abscess Drainage; chest tube insertion; Abdomen surgery; Cataract removal with implant (Bilateral); LASIK (Bilateral); Finger amputation (Left, 03/13/2021); Hand surgery (Right, 09/14/2021); Hand surgery (Right, 09/15/2021); Finger amputation (Right, 10/11/2021); Foot surgery (Right, 01/27/2022); and Foot Debridement (Right, 1/27/2022). SOCIALHISTORY:      reports that she has never smoked. She has never used smokeless tobacco. She reports previous alcohol use. She reports current drug use. Drugs: Marijuana (Weed) and Cocaine. FAMILY HISTORY:      family history includes Diabetes in her brother, father, mother, and sister; No Known Problems in her sister; Other in her son; Stroke in her mother. HOME MEDICATIONS:      Prior to Admission medications    Medication Sig Start Date End Date Taking? Authorizing Provider   pregabalin (LYRICA) 200 MG capsule Take 1 capsule by mouth 2 times daily for 30 days.  1/20/22 2/19/22  Nishi Rea MD   insulin glargine (LANTUS SOLOSTAR) 100 UNIT/ML injection pen Inject 30 Units into the skin 2 times daily 1/20/22   Nishi Rea MD   fluticasone [facility]) 50 MCG/ACT nasal spray 1 spray by Each Nostril route daily 12/22/21   Radha Cosme MD   acetaminophen (TYLENOL) 500 MG tablet Take 2 tablets by mouth 3 times daily as needed for Pain 12/22/21   Radha Cosme MD   dicyclomine (BENTYL) 10 MG capsule Take 1 capsule by mouth 4 times daily 12/17/21   Stacie Rinne, MD   metFORMIN (GLUCOPHAGE) 1000 MG tablet Take 1 tablet by mouth 2 times daily (with meals) 12/17/21   Stacie Rinne, MD   mirtazapine (REMERON) 7.5 MG tablet Take 1 tablet by mouth nightly 12/17/21   Stacie Rinne, MD   pantoprazole (PROTONIX) 20 MG tablet Take 1 tablet by mouth 2 times daily (before meals) 12/17/21   Stacie Rinne, MD   traZODone (DESYREL) 150 MG tablet Take 1 tablet by mouth nightly 12/17/21   Stacie Rinne, MD   venlafaxine (EFFEXOR XR) 150 MG extended release capsule Take 1 capsule by mouth daily (with breakfast) 12/17/21   Stacie Rinne, MD   ibuprofen (ADVIL;MOTRIN) 800 MG tablet Take 1 tablet by mouth every 8 hours as needed for Pain 11/15/21 11/29/21  Bouchra Jeffery MD   Lancets MISC 1 each by Does not apply route 3 times daily 11/15/21   Bouchra Jeffery MD   Alcohol Swabs 70 % PADS Use 1 swab 3 times daily as needed 11/15/21   Bouchra Jeffery MD   blood glucose monitor strips Test 3 times a day & as needed for symptoms of irregular blood glucose. Dispense sufficient amount for indicated testing frequency plus additional to accommodate PRN testing needs.  11/8/21   Italo Maki MD   Lancets MISC 1 each by Does not apply route 3 times daily 11/8/21   Italo Maki MD   Insulin Pen Needle ([employer] PEN NEEDLES 31G) 31G X 8 MM MISC 1 each by Does not apply route daily 11/8/21   Sher Adkins MD   FREESTYLE LITE strip 1 each by Does not apply route 3 times daily 11/11/20   Stacie Rinne, MD   albuterol sulfate  (90 Base) MCG/ACT inhaler Inhale 2 puffs into the lungs every 4 hours as needed for Wheezing 10/20/20 9/22/21  Stacie Rinne, MD   FLOVENT  MCG/ACT inhaler Inhale 2 puffs into the lungs 2 times daily 10/20/20   Stacie Rinne, MD   budesonide-formoterol (SYMBICORT) 80-4.5 MCG/ACT AERO Inhale 2 puffs into the lungs 2 times daily 10/20/20   Stacie Rinne, MD ALLERGIES:     Bactrim [sulfamethoxazole-trimethoprim] and Adhesive tape    OBJECTIVE:       Vitals:    01/28/22 0815 01/28/22 1130 01/28/22 1539 01/28/22 2021   BP: 119/68 117/72  106/72   Pulse: 86 84  60   Resp: 16 16 22   Temp: 98 °F (36.7 °C) 97.8 °F (36.6 °C)  97.9 °F (36.6 °C)   TempSrc: Oral Oral  Oral   SpO2: 96% 97%  100%   Weight:       Height:   5' 6\" (1.676 m)          Intake/Output Summary (Last 24 hours) at 1/29/2022 0729  Last data filed at 1/29/2022 0515  Gross per 24 hour   Intake --   Output 1600 ml   Net -1600 ml       PHYSICAL EXAM:    Physical Exam  Vitals and nursing note reviewed. Constitutional:       General: She is not in acute distress. Appearance: Normal appearance. She is not ill-appearing. HENT:      Head: Normocephalic and atraumatic. Mouth/Throat:      Pharynx: Oropharynx is clear. Cardiovascular:      Rate and Rhythm: Normal rate and regular rhythm. Pulses: Normal pulses. Heart sounds: No murmur heard. Pulmonary:      Effort: Pulmonary effort is normal.      Breath sounds: Normal breath sounds. Abdominal:      Palpations: Abdomen is soft. There is no mass. Tenderness: There is no abdominal tenderness. Musculoskeletal:         General: No tenderness. Normal range of motion. Cervical back: Normal range of motion and neck supple. Right lower leg: Edema present. Left lower leg: Edema present. Neurological:      General: No focal deficit present. Mental Status: She is alert and oriented to person, place, and time. ASSESSMENT:      Active Problems:    Type 2 diabetes mellitus without complication, with long-term current use of insulin ([facility])    Right foot infection    Cellulitis and abscess of toe of right foot    Abscess of right foot    Bilateral cellulitis of lower leg related to related to  uncontrolled diabetes  Resolved Problems:    * No resolved hospital problems.  *      PLAN:        RLE foot wound/infection  - Tmax 100.4  - leukocytosis, elevated CRP  - X-ray revealed no foreign body in foot. However moderate right foot swelling.  - S/P I&D of right foot and left foot debridement. -S/P Right foot debridment   - on IV clindamycin pending wound cultures  - pain control  -  ml/hr  - podiatry consulted, follow up recommendations  -ID consulted- appreciate recs.   - elevated D-dime  -Venous doppler shows chronic DVT  -Continue DVT prophylaxis, lovenos      UTI   Cx: klebsiella  S/P Cefepime  On keflex till 1/29    DM2  - A1C 10  -POCT glucose  -on lantus 25U BID  - medium dose ISS  - hypoglycemia protocol     Bipolar disorder  - stable  - continue remeron 7.5 mg PO nightly  - effexor  mg po daily  - trazadone 150 mg PO nightly    Plan will be discussed with the attending, Arabella Shea MD  Family Medicine Resident  1/29/2022 7:29 AM

## 2022-01-29 NOTE — PLAN OF CARE
POC reviewed with patient, understanding verbalized. ANOx4, VSS on RA. Stable overnight, NAD noted. She is up to the [facility] with a standby assist, non-weight bearing to the RLE. Bottom of L foot has a bandaid over puncture, C/D/I. Managing her pain with PRN oxy and tylenol. She is receiving ABX through PIV, tolerating well and no s/s of infiltration. No BM tonight, UOP clear yellow and adequate. Regular diet tolerated well although she could benefit from diabetes education as her sugar was 264 before night time coverage. Bed locked in lowest position, call bell in reach, frequently rounded Q2 for safety. WCTM. Heidy You RN      Problem: Falls - Risk of:  Goal: Will remain free from falls  Description: Will remain free from falls  Outcome: Ongoing     Problem: Musculor/Skeletal Functional Status  Goal: Highest potential functional level  Outcome: Ongoing     Problem: Nutrition  Goal: Optimal nutrition therapy  1/29/2022 0526 by Aubrie Diaz RN  Outcome: Ongoing  1/28/2022 1551 by Sebastien Jo RD, LD  Outcome: Ongoing  Note: Nutrition Problem #1: Increased nutrient needs  Intervention: Food and/or Nutrient Delivery: Continue Current Diet (Monitor need for oral supplements.)  Nutritional Goals: Oral intakes to meet at least 75% of estimated nutrition needs.      Problem: Pain:  Goal: Control of acute pain  Description: Control of acute pain  Outcome: Ongoing

## 2022-01-30 LAB
ABSOLUTE EOS #: 0.17 K/UL (ref 0–0.44)
ABSOLUTE IMMATURE GRANULOCYTE: 0.07 K/UL (ref 0–0.3)
ABSOLUTE LYMPH #: 3.15 K/UL (ref 1.1–3.7)
ABSOLUTE MONO #: 0.94 K/UL (ref 0.1–1.2)
ANION GAP SERPL CALCULATED.3IONS-SCNC: 10 MMOL/L (ref 9–17)
BASOPHILS # BLD: 0 % (ref 0–2)
BASOPHILS ABSOLUTE: 0.04 K/UL (ref 0–0.2)
BUN BLDV-MCNC: 14 MG/DL (ref 6–20)
BUN/CREAT BLD: ABNORMAL (ref 9–20)
C-REACTIVE PROTEIN: 58.2 MG/L (ref 0–5)
CALCIUM SERPL-MCNC: 8.3 MG/DL (ref 8.6–10.4)
CHLORIDE BLD-SCNC: 105 MMOL/L (ref 98–107)
CO2: 24 MMOL/L (ref 20–31)
CREAT SERPL-MCNC: 0.82 MG/DL (ref 0.5–0.9)
DIFFERENTIAL TYPE: ABNORMAL
EOSINOPHILS RELATIVE PERCENT: 2 % (ref 1–4)
GFR AFRICAN AMERICAN: >60 ML/MIN
GFR NON-AFRICAN AMERICAN: >60 ML/MIN
GFR SERPL CREATININE-BSD FRML MDRD: ABNORMAL ML/MIN/{1.73_M2}
GFR SERPL CREATININE-BSD FRML MDRD: ABNORMAL ML/MIN/{1.73_M2}
GLUCOSE BLD-MCNC: 107 MG/DL (ref 70–99)
GLUCOSE BLD-MCNC: 113 MG/DL (ref 65–105)
GLUCOSE BLD-MCNC: 156 MG/DL (ref 65–105)
GLUCOSE BLD-MCNC: 165 MG/DL (ref 65–105)
GLUCOSE BLD-MCNC: 189 MG/DL (ref 65–105)
HCT VFR BLD CALC: 24.8 % (ref 36.3–47.1)
HEMOGLOBIN: 7.5 G/DL (ref 11.9–15.1)
IMMATURE GRANULOCYTES: 1 %
LYMPHOCYTES # BLD: 34 % (ref 24–43)
MCH RBC QN AUTO: 30.4 PG (ref 25.2–33.5)
MCHC RBC AUTO-ENTMCNC: 30.2 G/DL (ref 28.4–34.8)
MCV RBC AUTO: 100.4 FL (ref 82.6–102.9)
MONOCYTES # BLD: 10 % (ref 3–12)
NRBC AUTOMATED: 0.3 PER 100 WBC
PDW BLD-RTO: 13.9 % (ref 11.8–14.4)
PLATELET # BLD: 401 K/UL (ref 138–453)
PLATELET ESTIMATE: ABNORMAL
PMV BLD AUTO: 10.4 FL (ref 8.1–13.5)
POTASSIUM SERPL-SCNC: 4.3 MMOL/L (ref 3.7–5.3)
RBC # BLD: 2.47 M/UL (ref 3.95–5.11)
RBC # BLD: ABNORMAL 10*6/UL
SEDIMENTATION RATE, ERYTHROCYTE: 61 MM (ref 0–30)
SEG NEUTROPHILS: 54 % (ref 36–65)
SEGMENTED NEUTROPHILS ABSOLUTE COUNT: 5.04 K/UL (ref 1.5–8.1)
SODIUM BLD-SCNC: 139 MMOL/L (ref 135–144)
WBC # BLD: 9.4 K/UL (ref 3.5–11.3)
WBC # BLD: ABNORMAL 10*3/UL

## 2022-01-30 PROCEDURE — 6370000000 HC RX 637 (ALT 250 FOR IP): Performed by: STUDENT IN AN ORGANIZED HEALTH CARE EDUCATION/TRAINING PROGRAM

## 2022-01-30 PROCEDURE — 6360000002 HC RX W HCPCS: Performed by: STUDENT IN AN ORGANIZED HEALTH CARE EDUCATION/TRAINING PROGRAM

## 2022-01-30 PROCEDURE — 1200000000 HC SEMI PRIVATE

## 2022-01-30 PROCEDURE — 86140 C-REACTIVE PROTEIN: CPT

## 2022-01-30 PROCEDURE — 82947 ASSAY GLUCOSE BLOOD QUANT: CPT

## 2022-01-30 PROCEDURE — 85025 COMPLETE CBC W/AUTO DIFF WBC: CPT

## 2022-01-30 PROCEDURE — 2580000003 HC RX 258: Performed by: INTERNAL MEDICINE

## 2022-01-30 PROCEDURE — 80048 BASIC METABOLIC PNL TOTAL CA: CPT

## 2022-01-30 PROCEDURE — 36415 COLL VENOUS BLD VENIPUNCTURE: CPT

## 2022-01-30 PROCEDURE — 2580000003 HC RX 258: Performed by: PODIATRIST

## 2022-01-30 PROCEDURE — 6360000002 HC RX W HCPCS: Performed by: INTERNAL MEDICINE

## 2022-01-30 PROCEDURE — 6370000000 HC RX 637 (ALT 250 FOR IP): Performed by: PODIATRIST

## 2022-01-30 PROCEDURE — 85652 RBC SED RATE AUTOMATED: CPT

## 2022-01-30 PROCEDURE — 99232 SBSQ HOSP IP/OBS MODERATE 35: CPT | Performed by: INTERNAL MEDICINE

## 2022-01-30 RX ADMIN — DICYCLOMINE HYDROCHLORIDE 10 MG: 10 CAPSULE ORAL at 20:05

## 2022-01-30 RX ADMIN — TRAZODONE HYDROCHLORIDE 150 MG: 100 TABLET ORAL at 20:05

## 2022-01-30 RX ADMIN — BUDESONIDE AND FORMOTEROL FUMARATE DIHYDRATE 2 PUFF: 80; 4.5 AEROSOL RESPIRATORY (INHALATION) at 08:00

## 2022-01-30 RX ADMIN — MIRTAZAPINE 7.5 MG: 15 TABLET, FILM COATED ORAL at 20:05

## 2022-01-30 RX ADMIN — PREGABALIN 200 MG: 100 CAPSULE ORAL at 20:05

## 2022-01-30 RX ADMIN — PANTOPRAZOLE SODIUM 20 MG: 40 TABLET, DELAYED RELEASE ORAL at 15:48

## 2022-01-30 RX ADMIN — OXYCODONE HYDROCHLORIDE 5 MG: 5 TABLET ORAL at 11:51

## 2022-01-30 RX ADMIN — CEFAZOLIN 2000 MG: 10 INJECTION, POWDER, FOR SOLUTION INTRAVENOUS at 03:50

## 2022-01-30 RX ADMIN — DICYCLOMINE HYDROCHLORIDE 10 MG: 10 CAPSULE ORAL at 15:48

## 2022-01-30 RX ADMIN — BUDESONIDE AND FORMOTEROL FUMARATE DIHYDRATE 2 PUFF: 80; 4.5 AEROSOL RESPIRATORY (INHALATION) at 20:05

## 2022-01-30 RX ADMIN — INSULIN GLARGINE 25 UNITS: 100 INJECTION, SOLUTION SUBCUTANEOUS at 20:02

## 2022-01-30 RX ADMIN — OXYCODONE HYDROCHLORIDE 5 MG: 5 TABLET ORAL at 08:01

## 2022-01-30 RX ADMIN — INSULIN LISPRO 1 UNITS: 100 INJECTION, SOLUTION INTRAVENOUS; SUBCUTANEOUS at 20:01

## 2022-01-30 RX ADMIN — SODIUM CHLORIDE, PRESERVATIVE FREE 10 ML: 5 INJECTION INTRAVENOUS at 20:18

## 2022-01-30 RX ADMIN — SODIUM CHLORIDE, PRESERVATIVE FREE 10 ML: 5 INJECTION INTRAVENOUS at 07:59

## 2022-01-30 RX ADMIN — FLUTICASONE PROPIONATE 1 SPRAY: 50 SPRAY, METERED NASAL at 08:00

## 2022-01-30 RX ADMIN — PREGABALIN 200 MG: 100 CAPSULE ORAL at 07:59

## 2022-01-30 RX ADMIN — INSULIN LISPRO 2 UNITS: 100 INJECTION, SOLUTION INTRAVENOUS; SUBCUTANEOUS at 16:12

## 2022-01-30 RX ADMIN — VENLAFAXINE HYDROCHLORIDE 150 MG: 150 CAPSULE, EXTENDED RELEASE ORAL at 07:59

## 2022-01-30 RX ADMIN — OXYCODONE HYDROCHLORIDE 5 MG: 5 TABLET ORAL at 15:48

## 2022-01-30 RX ADMIN — DICYCLOMINE HYDROCHLORIDE 10 MG: 10 CAPSULE ORAL at 08:00

## 2022-01-30 RX ADMIN — CEFAZOLIN 2000 MG: 10 INJECTION, POWDER, FOR SOLUTION INTRAVENOUS at 11:51

## 2022-01-30 RX ADMIN — INSULIN GLARGINE 25 UNITS: 100 INJECTION, SOLUTION SUBCUTANEOUS at 08:02

## 2022-01-30 RX ADMIN — OXYCODONE HYDROCHLORIDE 5 MG: 5 TABLET ORAL at 03:50

## 2022-01-30 RX ADMIN — PANTOPRAZOLE SODIUM 20 MG: 40 TABLET, DELAYED RELEASE ORAL at 07:04

## 2022-01-30 RX ADMIN — CEFAZOLIN 2000 MG: 10 INJECTION, POWDER, FOR SOLUTION INTRAVENOUS at 20:02

## 2022-01-30 RX ADMIN — DICYCLOMINE HYDROCHLORIDE 10 MG: 10 CAPSULE ORAL at 11:50

## 2022-01-30 RX ADMIN — INSULIN LISPRO 2 UNITS: 100 INJECTION, SOLUTION INTRAVENOUS; SUBCUTANEOUS at 11:51

## 2022-01-30 RX ADMIN — OXYCODONE HYDROCHLORIDE 5 MG: 5 TABLET ORAL at 20:02

## 2022-01-30 RX ADMIN — ACETAMINOPHEN 650 MG: 325 TABLET ORAL at 07:04

## 2022-01-30 RX ADMIN — DIPHENHYDRAMINE HYDROCHLORIDE 25 MG: 50 INJECTION, SOLUTION INTRAMUSCULAR; INTRAVENOUS at 20:02

## 2022-01-30 ASSESSMENT — PAIN DESCRIPTION - PROGRESSION

## 2022-01-30 ASSESSMENT — PAIN SCALES - GENERAL
PAINLEVEL_OUTOF10: 7
PAINLEVEL_OUTOF10: 9
PAINLEVEL_OUTOF10: 10
PAINLEVEL_OUTOF10: 7
PAINLEVEL_OUTOF10: 10
PAINLEVEL_OUTOF10: 7
PAINLEVEL_OUTOF10: 7

## 2022-01-30 ASSESSMENT — ENCOUNTER SYMPTOMS
ABDOMINAL DISTENTION: 0
COUGH: 0
COLOR CHANGE: 1
PHOTOPHOBIA: 0
APNEA: 0
EYE DISCHARGE: 0

## 2022-01-30 NOTE — PROGRESS NOTES
Progress Note  Podiatric Medicine and Surgery     Subjective     CC: right foot pain    Patient seen and examined at bedside. S/p I&D right foot (DOS:1/23/22)   No acute events overnight. Some mild pain to RLE. HPI :  Bj Sandra is a 49 y.o. female seen at [facility] for right foot pain. Patient stepped on a piece of glass a few days ago. She went to her PCP when PCP removed the glass. She presented to the ED last night due to increased pain and swelling to the right lower extremity. Upon arrival, X-ray was taken which showed no foreign body, acute osseous deformity or soft tissue emphysema. During my evaluation, patient also mentioned pain on her plantar left foot and  pain is not as bad as her right foot. Per chart review, patient visited [facility] podiatry clinic a year ago for diabetic foot care but never follows up after that. 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 Her last A1C in 12/13/2021 is 13.3%. She admits neuropathic pain to bilateral lower extremity. She denies other pedal complaints. ROS: Denies N/V/F/C/SOB/CP. Otherwise negative except at stated in the HPI.      Medications:  Scheduled Meds:   ceFAZolin  2,000 mg IntraVENous Q8H    insulin glargine  25 Units SubCUTAneous BID    enoxaparin  30 mg SubCUTAneous BID    budesonide-formoterol  2 puff Inhalation BID    dicyclomine  10 mg Oral 4x Daily    fluticasone  1 spray Each Nostril Daily    mirtazapine  7.5 mg Oral Nightly    pantoprazole  20 mg Oral BID AC    pregabalin  200 mg Oral BID    traZODone  150 mg Oral Nightly    venlafaxine  150 mg Oral Daily with breakfast    sodium chloride flush  5-40 mL IntraVENous 2 times per day    insulin lispro  0-12 Units SubCUTAneous TID WC    insulin lispro  0-6 Units SubCUTAneous Nightly       Continuous Infusions:   sodium chloride      dextrose         PRN Meds:simethicone, diphenhydrAMINE, albuterol sulfate HFA, oxyCODONE, sodium chloride flush, sodium chloride, ondansetron **OR** ondansetron, polyethylene glycol, acetaminophen **OR** acetaminophen, potassium chloride **OR** potassium alternative oral replacement **OR** potassium chloride, glucose, dextrose, glucagon (rDNA), dextrose    Objective     Vitals:  No data found. Average, Min, and Max for last 24 hours Vitals:  TEMPERATURE:  Temp  Av.8 °F (36.6 °C)  Min: 97.4 °F (36.3 °C)  Max: 98.2 °F (36.8 °C)    RESPIRATIONS RANGE: Resp  Av  Min: 20  Max: 20    PULSE RANGE: Pulse  Av.5  Min: 86  Max: 87    BLOOD PRESSURE RANGE:  Systolic (02EWC), NFI:649 , Min:127 , QMX:558   ; Diastolic (61YHS), YRC:55, Min:76, Max:78      PULSE OXIMETRY RANGE: SpO2  Av %  Min: 98 %  Max: 98 %    I/O last 3 completed shifts:  In: -   Out: 1600 [Urine:1600]    CBC:  Recent Labs     22  0342 22  0612   WBC 14.9* 7.8   HGB 7.7* 7.6*   HCT 23.6* 24.3*    378        BMP:  Recent Labs     22  0342 22  0612   * 139   K 5.1 4.5    107   CO2 20 23   BUN 14 15   CREATININE 0.84 0.72   GLUCOSE 448* 120*   CALCIUM 9.0 8.9        Coags:  No results for input(s): APTT, PROT, INR in the last 72 hours. Lab Results   Component Value Date    SEDRATE 124 (H) 2022     No results for input(s): CRP in the last 72 hours. Lower Extremity Physical Exam:   Vascular: DP and PT pulses are palpable. CFT <3 seconds to all digits. Hair growth is absent to the level of the digits.        Neuro: Saph/sural/SP/DP/plantar sensation diminished to light touch.     Musculoskeletal: Muscle strength is 5/5 to all lower extremity muscle groups. Gross deformity is status post left 4th and 5th digit amputation. Tenderness to palpation to previous entry point of injury on the right foot and hyperkeratotic tissue on the left foot.      Dermatologic:     Diffuse edema noted throughout the right lower extremity without associated increase in warmth. Right foot: Incision with significant maceration. Tamar-incision skin is soft. No fluctuance. No purulence or foul odor appreciated. No periwound erythema or any other signs of active infection. Some serous drainage appreciated. Left foot: Full thickness ulcer #2 located lateral plantar left foot and measures approximately 1cmx 1cmx0.2cm. Base is fibrotic. Does not probe to bone, sinus track, or undermine. No fluctuance, crepitus, or induration. Interdigital maceration absent. Clinical Images:                  Imaging:   XR ABDOMEN (KUB) (SINGLE AP VIEW)   Final Result   Increased stool burden seen diffusely throughout the colon suggesting   clinical presentation of constipation. Phleboliths seen on both sides of the   pelvis. CT FOOT RIGHT W CONTRAST   Final Result   1. Shallow soft tissue ulceration plantar and medial to the 1st metatarsal   base with an underlying area of possible abscess versus phlegmon measuring   2.8 x 1.1 x 0.9 cm.   2. Extensive subcutaneous fat stranding compatible with cellulitis. No soft   tissue gas. 3. No evidence of osteomyelitis or other acute osseous abnormality. US RENAL COMPLETE   Final Result   Unremarkable renal ultrasound. MRI FOOT RIGHT WO CONTRAST   Final Result   Subcutaneous edema and swelling, most notably affecting the dorsum of the   foot, which may reflect underlying cellulitis. No discrete organized fluid   collection is seen within the limitations of a noncontrast study. No imaging features of acute osteomyelitis appreciated. MRI FOOT LEFT WO CONTRAST   Final Result   Limited evaluation due to patient motion artifact on several sequences. Skin thickening and subcutaneous edema seen within the plantar soft tissues   of the foot at the level of the proximal metatarsals, which may reflect   underlying cellulitis. Within the limitations of a noncontrast study, no   discrete organized fluid collection is seen.       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 VL DUP LOWER EXTREMITY VENOUS BILATERAL   Final Result      XR FOOT RIGHT (MIN 3 VIEWS)   Final Result   Right foot: No radiopaque foreign body. No fracture. Moderate soft tissue   swelling within the anterior aspect of plantar aspect of the foot   predominantly underlying meta tarsal heads. Left foot: Changes related to prior partial amputation of 4th and 5th ray as   described. No acute fracture. No radiopaque foreign body. Moderate soft   tissue swelling and plantar aspect of the foot. No discrete soft tissue ulcer   is noted. XR FOOT LEFT (MIN 3 VIEWS)   Final Result   Right foot: No radiopaque foreign body. No fracture. Moderate soft tissue   swelling within the anterior aspect of plantar aspect of the foot   predominantly underlying meta tarsal heads. Left foot: Changes related to prior partial amputation of 4th and 5th ray as   described. No acute fracture. No radiopaque foreign body. Moderate soft   tissue swelling and plantar aspect of the foot. No discrete soft tissue ulcer   is noted. Cultures: Group A strep. anaerobe contaminated. Intra -op culture: rare GPC in pairs    William Cedillo is a 49 y.o. female with   1. Diabetic foot ulcer to subcutaneous layer, bilateral feet  2. Cellulitis, bilateral feet  3. Superficial abscess, bilateral feet  4. Poorly uncontrolled DM2 with peripheral neuropathy. 5. S/p bedside I&D right foot    Active Problems:    Type 2 diabetes mellitus without complication, with long-term current use of insulin (HCC)    Right foot infection    Cellulitis and abscess of toe of right foot    Abscess of right foot    Bilateral cellulitis of lower leg related to related to  uncontrolled diabetes  Resolved Problems:    * No resolved hospital problems.  *       Plan     · Patient examined and evaluated at bedside   · Treatment options discussed in detail with the patient. · Antibiotics per ID  · Medical management per primary. · Dressing applied to right foot: betadine, silvercell, 4x4 gauze, kerlix, ace. Dressing to be changes twice daily. · Weight bearing to heel of right foot. WBAT to left foot. · Patient remains stable for discharge from podiatry perspective.   · Discussed with Dr. Josephine Hayden, SARA   Podiatric Medicine & Surgery   1/30/2022 at 7:12 AM

## 2022-01-30 NOTE — CARE COORDINATION
Patient may qualify for PM&R. Resident to put in order. Patient is refusing to go to a facility at this time. Will discuss PM&R with her. Will need HC and IV antibiotics set up if refuses ARU.

## 2022-01-30 NOTE — PLAN OF CARE
Problem: Pain:  Goal: Pain level will decrease  Description: Pain level will decrease  Outcome: Ongoing  Goal: Control of acute pain  Description: Control of acute pain  Outcome: Ongoing  Goal: Control of chronic pain  Description: Control of chronic pain  Outcome: Ongoing     Problem: Falls - Risk of:  Goal: Will remain free from falls  Description: Will remain free from falls  Outcome: Ongoing  Goal: Absence of physical injury  Description: Absence of physical injury  Outcome: Ongoing     Problem: Musculor/Skeletal Functional Status  Goal: Highest potential functional level  Outcome: Ongoing     Problem: Nutrition  Goal: Optimal nutrition therapy  Outcome: Ongoing

## 2022-01-30 NOTE — PROGRESS NOTES
depressive disorder, in partial remission ([facility] [address] Screening mammogram, encounter for, Severe recurrent major depression with psychotic features ([facility] [address] Severe recurrent major depression without psychotic features ([facility] [address] Stroke (cerebrum) ([facility] [address] Stroke ([facility] [address] Suicidal ideation, Suicidal intent, Vitamin D deficiency, and White matter changes. PAST SURGICAL HISTORY:      has a past surgical history that includes Abscess Drainage; chest tube insertion; Abdomen surgery; Cataract removal with implant (Bilateral); LASIK (Bilateral); Finger amputation (Left, 03/13/2021); Hand surgery (Right, 09/14/2021); Hand surgery (Right, 09/15/2021); Finger amputation (Right, 10/11/2021); Foot surgery (Right, 01/27/2022); and Foot Debridement (Right, 1/27/2022). SOCIAL HISTORY:      reports that she has never smoked. She has never used smokeless tobacco. She reports previous alcohol use. She reports current drug use. Drugs: Marijuana (Weed) and Cocaine. FAMILY HISTORY:     family history includes Diabetes in her brother, father, mother, and sister; No Known Problems in her sister; Other in her son; Stroke in her mother. HOME MEDICATIONS:      Prior to Admission medications    Medication Sig Start Date End Date Taking? Authorizing Provider   pregabalin (LYRICA) 200 MG capsule Take 1 capsule by mouth 2 times daily for 30 days.  1/20/22 2/19/22  Lilli Caballero MD   insulin glargine (LANTUS SOLOSTAR) 100 UNIT/ML injection pen Inject 30 Units into the skin 2 times daily 1/20/22   Lilli Caballero MD   fluticasone Romilda Matthew) 50 MCG/ACT nasal spray 1 spray by Each Nostril route daily 12/22/21   Caryl Diaz MD   acetaminophen (TYLENOL) 500 MG tablet Take 2 tablets by mouth 3 times daily as needed for Pain 12/22/21   Caryl Diaz MD   dicyclomine (BENTYL) 10 MG capsule Take 1 capsule by mouth 4 times daily 12/17/21   Caryl Diaz MD   metFORMIN (GLUCOPHAGE) 1000 MG tablet Take 1 tablet by mouth 2 times daily 106/72 127/78 130/76   Pulse:  60 87 86   Resp:  22 20 20   Temp:  97.9 °F (36.6 °C) 97.4 °F (36.3 °C) 98.2 °F (36.8 °C)   TempSrc:  Oral Oral Oral   SpO2:  100% 98%    Weight:       Height: 5' 6\" (1.676 m)          No intake or output data in the 24 hours ending 01/30/22 0705    PHYSICAL EXAM:  General Appearance  Alert , awake , not in acute distress  HEENT - Head is normocephalic, atraumatic. Lungs - Bilateral equal air entry , no wheezes, rales or rhonchi, aeration good  Cardiovascular - Heart sounds are normal.  Regular rhythm, normal rate without murmur, gallop or rub.   Abdomen - Soft, nontender, nondistended, no masses or organomegaly  Neurologic - There are no new focal motor or sensory deficits  Skin - No bruising or bleeding on exposed skin area  Extremities - No cyanosis, clubbing or edema      DIAGNOSTICS:     Laboratory Testing:    Recent Results (from the past 24 hour(s))   POC Glucose Fingerstick    Collection Time: 01/29/22 12:07 PM   Result Value Ref Range    POC Glucose 139 (H) 65 - 105 mg/dL   POC Glucose Fingerstick    Collection Time: 01/29/22  4:07 PM   Result Value Ref Range    POC Glucose 175 (H) 65 - 105 mg/dL   POC Glucose Fingerstick    Collection Time: 01/29/22  9:58 PM   Result Value Ref Range    POC Glucose 158 (H) 65 - 105 mg/dL         Imaging/Diagonstics:      Current Facility-Administered Medications   Medication Dose Route Frequency Provider Last Rate Last Admin    ceFAZolin (ANCEF) 2000 mg in dextrose 5 % 50 mL IVPB  2,000 mg IntraVENous Q8H Maylin Douglas MD   Stopped at 01/30/22 0425    insulin glargine (LANTUS) injection vial 25 Units  25 Units SubCUTAneous BID Susan Cherry MD   25 Units at 01/29/22 2203    simethicone (MYLICON) chewable tablet 80 mg  80 mg Oral Q6H PRN Susan Cherry MD   80 mg at 01/28/22 1625    diphenhydrAMINE (BENADRYL) injection 25 mg  25 mg IntraVENous Q8H PRN Zackary Gutierrez MD   25 mg at 01/29/22 5796    enoxaparin (LOVENOX) injection 30 mg  30 mg SubCUTAneous BID Lg Aguilar DPM   30 mg at 01/29/22 0838    albuterol sulfate  (90 Base) MCG/ACT inhaler 2 puff  2 puff Inhalation Q4H PRN Lg Aguilar DPM   2 puff at 01/24/22 2016    oxyCODONE (ROXICODONE) immediate release tablet 5 mg  5 mg Oral Q4H PRN Lg Aguilar DPM   5 mg at 01/30/22 0350    budesonide-formoterol (SYMBICORT) 80-4.5 MCG/ACT inhaler 2 puff  2 puff Inhalation BID Lg Aguilar DPM   2 puff at 01/29/22 2203    dicyclomine (BENTYL) capsule 10 mg  10 mg Oral 4x Daily Lg Aguilar DPM   10 mg at 01/29/22 2152    fluticasone (FLONASE) 50 MCG/ACT nasal spray 1 spray  1 spray Each Nostril Daily Lg Aguilar DPM   1 spray at 01/29/22 0838    mirtazapine (REMERON) tablet 7.5 mg  7.5 mg Oral Nightly Lg Aguilar DPM   7.5 mg at 01/29/22 2151    pantoprazole (PROTONIX) tablet 20 mg  20 mg Oral BID AC Lg Aguilar DPM   20 mg at 01/30/22 0704    pregabalin (LYRICA) capsule 200 mg  200 mg Oral BID Lg Aguilar DPM   200 mg at 01/29/22 2152    traZODone (DESYREL) tablet 150 mg  150 mg Oral Nightly Lg Aguilar DPM   150 mg at 01/29/22 2151    venlafaxine (EFFEXOR XR) extended release capsule 150 mg  150 mg Oral Daily with breakfast Lg Aguilar DPM   150 mg at 01/29/22 0683    sodium chloride flush 0.9 % injection 5-40 mL  5-40 mL IntraVENous 2 times per day Lg Aguilar DPM   10 mL at 01/28/22 2110    sodium chloride flush 0.9 % injection 5-40 mL  5-40 mL IntraVENous PRN Lg Aguilar DPM        0.9 % sodium chloride infusion  25 mL IntraVENous PRN Lg Aguilar DPM        ondansetron (ZOFRAN-ODT) disintegrating tablet 4 mg  4 mg Oral Q8H PRN Lg Aguilar DPM   4 mg at 01/29/22 1201    Or    ondansetron (ZOFRAN) injection 4 mg  4 mg IntraVENous Q6H PRN Lg Aguilar DPM        polyethylene glycol (GLYCOLAX) packet 17 g  17 g Oral Daily PRN Lg Aguilar DPM        acetaminophen (TYLENOL) tablet 650 mg  650 mg Oral Q6H PRN Lg Aguilar DPM   650 mg at 01/30/22 0704    Or    acetaminophen (TYLENOL) suppository 650 mg  650 mg Rectal Q6H PRN Fredirick Eglin, DPM        potassium chloride (KLOR-CON M) extended release tablet 40 mEq  40 mEq Oral PRN Fredirick Eglin, DPM        Or    potassium bicarb-citric acid (EFFER-K) effervescent tablet 40 mEq  40 mEq Oral PRN Fredirick Eglin, DPM        Or    potassium chloride 10 mEq/100 mL IVPB (Peripheral Line)  10 mEq IntraVENous PRN Fredirick Eglin, DPM        insulin lispro (HUMALOG) injection vial 0-12 Units  0-12 Units SubCUTAneous TID WC Fredirick Eglin, DPM   2 Units at 01/29/22 1623    insulin lispro (HUMALOG) injection vial 0-6 Units  0-6 Units SubCUTAneous Nightly Fredirick Eglin, DPM   1 Units at 01/29/22 2204    glucose (GLUTOSE) 40 % oral gel 15 g  15 g Oral PRN Fredirick Eglin, DPM   15 g at 01/26/22 0350    dextrose 50 % IV solution  12.5 g IntraVENous PRN Fredirick Eglin, DPM        glucagon (rDNA) injection 1 mg  1 mg IntraMUSCular PRN Fredirick Eglin, DPM        dextrose 5 % solution  100 mL/hr IntraVENous PRN Fredirick Eglin, DPM           ASSESSMENT:     Active Problems:    Type 2 diabetes mellitus without complication, with long-term current use of insulin ([facility])    Right foot infection    Cellulitis and abscess of toe of right foot    Abscess of right foot    Bilateral cellulitis of lower leg related to related to  uncontrolled diabetes  Resolved Problems:    * No resolved hospital problems. *      PLAN:     RLE foot wound/infection  - Tmax 100.4  - leukocytosis, elevated CRP  - X-ray revealed no foreign body in foot. However moderate right foot swelling.  - S/P I&D of right foot and left foot debridement. -S/P Right foot debridment   - Switch to Ancef  -Rocephin on discharge until 2/14, followed by po Keflex. - pain control/  -  ml/hr  - podiatry consulted, follow up recommendations  -ID consulted- appreciate recs.   - elevated D-dime  -Venous doppler shows chronic DVT  -Continue DVT prophylaxis, lovenos        UTI   Cx: klebsiella  S/P Cefepime  Acef 2g Q8hrs     DM2  - A1C 10  -POCT glucose  -on lantus 25U BID  - medium dose ISS  - hypoglycemia protocol     Bipolar disorder  - stable  - continue remeron 7.5 mg PO nightly  - effexor  mg po daily  - trazadone 150 mg PO nightly     Plan will be discussed with the attending, Zeina Doan MD  Family Medicine Resident  1/30/2022 7:05 AM            Please note that this chart was generated using voice recognition Dragon dictation software.   Although every effort was made to ensure the accuracy of this automated transcription, some errors in transcription may have occurred

## 2022-01-30 NOTE — PROGRESS NOTES
Occupational [address]  Occupational Therapy Not Seen Note    DATE: 2022    NAME: Sarath Suh  MRN: [id number]   : 1965      Patient not seen this date for Occupational Therapy due to:    Patient Declined: d/t fatigue and pain despite encouragement    Next Scheduled Treatment: Attempt at later date    Electronically signed by LUCAS Rodriguez on 2022 at 3:09 PM

## 2022-01-30 NOTE — FLOWSHEET NOTE
Assessment:  Patient is a 49year old female in room 12. Intervention:   was ministry of presence. Patient requested visit by .  has history ([employer]) with patient's daughter Carol Estrada [phone].  provided active compassionate listening.  prayed with patient. Outcome: Patient expressed gratitude. Plan:  Chaplains may be paged via [employer] 24/7.     01/30/22 0100   Encounter Summary   Services provided to: Patient   Referral/Consult From: Patient   Support System Children;Family members   Continue Visiting   (1/30/2022)   Complexity of Encounter Moderate   Length of Encounter 30 minutes   Spiritual Assessment Completed Yes   Routine   Type Follow up   Assessment Approachable; Anxious   Intervention Active listening;Prayer;Sustaining presence/ [facility] of presence   Outcome Expressed gratitude

## 2022-01-31 LAB
ABSOLUTE EOS #: 0.16 K/UL (ref 0–0.44)
ABSOLUTE IMMATURE GRANULOCYTE: 0.08 K/UL (ref 0–0.3)
ABSOLUTE LYMPH #: 2.51 K/UL (ref 1.1–3.7)
ABSOLUTE MONO #: 0.82 K/UL (ref 0.1–1.2)
ANION GAP SERPL CALCULATED.3IONS-SCNC: 10 MMOL/L (ref 9–17)
BASOPHILS # BLD: 0 % (ref 0–2)
BASOPHILS ABSOLUTE: 0.04 K/UL (ref 0–0.2)
BUN BLDV-MCNC: 14 MG/DL (ref 6–20)
BUN/CREAT BLD: ABNORMAL (ref 9–20)
C-REACTIVE PROTEIN: 53.3 MG/L (ref 0–5)
CALCIUM SERPL-MCNC: 8.4 MG/DL (ref 8.6–10.4)
CHLORIDE BLD-SCNC: 101 MMOL/L (ref 98–107)
CO2: 23 MMOL/L (ref 20–31)
CREAT SERPL-MCNC: 0.86 MG/DL (ref 0.5–0.9)
DIFFERENTIAL TYPE: ABNORMAL
EOSINOPHILS RELATIVE PERCENT: 2 % (ref 1–4)
GFR AFRICAN AMERICAN: >60 ML/MIN
GFR NON-AFRICAN AMERICAN: >60 ML/MIN
GFR SERPL CREATININE-BSD FRML MDRD: ABNORMAL ML/MIN/{1.73_M2}
GFR SERPL CREATININE-BSD FRML MDRD: ABNORMAL ML/MIN/{1.73_M2}
GLUCOSE BLD-MCNC: 123 MG/DL (ref 65–105)
GLUCOSE BLD-MCNC: 141 MG/DL (ref 65–105)
GLUCOSE BLD-MCNC: 146 MG/DL (ref 70–99)
GLUCOSE BLD-MCNC: 155 MG/DL (ref 65–105)
GLUCOSE BLD-MCNC: 273 MG/DL (ref 65–105)
GLUCOSE BLD-MCNC: 69 MG/DL (ref 65–105)
HCT VFR BLD CALC: 23.3 % (ref 36.3–47.1)
HEMOGLOBIN: 7.4 G/DL (ref 11.9–15.1)
IMMATURE GRANULOCYTES: 1 %
INTERVENTION: NORMAL
LYMPHOCYTES # BLD: 25 % (ref 24–43)
MCH RBC QN AUTO: 31.8 PG (ref 25.2–33.5)
MCHC RBC AUTO-ENTMCNC: 31.8 G/DL (ref 28.4–34.8)
MCV RBC AUTO: 100 FL (ref 82.6–102.9)
MONOCYTES # BLD: 8 % (ref 3–12)
NRBC AUTOMATED: 0.6 PER 100 WBC
PDW BLD-RTO: 14.2 % (ref 11.8–14.4)
PLATELET # BLD: 534 K/UL (ref 138–453)
PLATELET ESTIMATE: ABNORMAL
PMV BLD AUTO: 10.7 FL (ref 8.1–13.5)
POTASSIUM SERPL-SCNC: 4.1 MMOL/L (ref 3.7–5.3)
RBC # BLD: 2.33 M/UL (ref 3.95–5.11)
RBC # BLD: ABNORMAL 10*6/UL
SEG NEUTROPHILS: 65 % (ref 36–65)
SEGMENTED NEUTROPHILS ABSOLUTE COUNT: 6.6 K/UL (ref 1.5–8.1)
SODIUM BLD-SCNC: 134 MMOL/L (ref 135–144)
WBC # BLD: 10.2 K/UL (ref 3.5–11.3)
WBC # BLD: ABNORMAL 10*3/UL

## 2022-01-31 PROCEDURE — 2580000003 HC RX 258: Performed by: PODIATRIST

## 2022-01-31 PROCEDURE — 99232 SBSQ HOSP IP/OBS MODERATE 35: CPT | Performed by: INTERNAL MEDICINE

## 2022-01-31 PROCEDURE — 97110 THERAPEUTIC EXERCISES: CPT

## 2022-01-31 PROCEDURE — 6370000000 HC RX 637 (ALT 250 FOR IP): Performed by: PODIATRIST

## 2022-01-31 PROCEDURE — 76937 US GUIDE VASCULAR ACCESS: CPT

## 2022-01-31 PROCEDURE — 6360000002 HC RX W HCPCS: Performed by: INTERNAL MEDICINE

## 2022-01-31 PROCEDURE — 2580000003 HC RX 258: Performed by: INTERNAL MEDICINE

## 2022-01-31 PROCEDURE — 99233 SBSQ HOSP IP/OBS HIGH 50: CPT | Performed by: FAMILY MEDICINE

## 2022-01-31 PROCEDURE — 6360000002 HC RX W HCPCS: Performed by: PODIATRIST

## 2022-01-31 PROCEDURE — 1200000000 HC SEMI PRIVATE

## 2022-01-31 PROCEDURE — 82947 ASSAY GLUCOSE BLOOD QUANT: CPT

## 2022-01-31 PROCEDURE — 99253 IP/OBS CNSLTJ NEW/EST LOW 45: CPT | Performed by: STUDENT IN AN ORGANIZED HEALTH CARE EDUCATION/TRAINING PROGRAM

## 2022-01-31 PROCEDURE — 97116 GAIT TRAINING THERAPY: CPT

## 2022-01-31 PROCEDURE — 86140 C-REACTIVE PROTEIN: CPT

## 2022-01-31 PROCEDURE — 85025 COMPLETE CBC W/AUTO DIFF WBC: CPT

## 2022-01-31 PROCEDURE — 36415 COLL VENOUS BLD VENIPUNCTURE: CPT

## 2022-01-31 PROCEDURE — 6360000002 HC RX W HCPCS: Performed by: STUDENT IN AN ORGANIZED HEALTH CARE EDUCATION/TRAINING PROGRAM

## 2022-01-31 PROCEDURE — 80048 BASIC METABOLIC PNL TOTAL CA: CPT

## 2022-01-31 RX ADMIN — PANTOPRAZOLE SODIUM 20 MG: 40 TABLET, DELAYED RELEASE ORAL at 08:37

## 2022-01-31 RX ADMIN — DIPHENHYDRAMINE HYDROCHLORIDE 25 MG: 50 INJECTION, SOLUTION INTRAMUSCULAR; INTRAVENOUS at 19:47

## 2022-01-31 RX ADMIN — TRAZODONE HYDROCHLORIDE 150 MG: 100 TABLET ORAL at 21:23

## 2022-01-31 RX ADMIN — ENOXAPARIN SODIUM 30 MG: 100 INJECTION SUBCUTANEOUS at 21:24

## 2022-01-31 RX ADMIN — MIRTAZAPINE 7.5 MG: 15 TABLET, FILM COATED ORAL at 21:25

## 2022-01-31 RX ADMIN — CEFAZOLIN 2000 MG: 10 INJECTION, POWDER, FOR SOLUTION INTRAVENOUS at 03:30

## 2022-01-31 RX ADMIN — DICYCLOMINE HYDROCHLORIDE 10 MG: 10 CAPSULE ORAL at 08:37

## 2022-01-31 RX ADMIN — INSULIN LISPRO 2 UNITS: 100 INJECTION, SOLUTION INTRAVENOUS; SUBCUTANEOUS at 11:55

## 2022-01-31 RX ADMIN — PANTOPRAZOLE SODIUM 20 MG: 40 TABLET, DELAYED RELEASE ORAL at 16:15

## 2022-01-31 RX ADMIN — DICYCLOMINE HYDROCHLORIDE 10 MG: 10 CAPSULE ORAL at 12:27

## 2022-01-31 RX ADMIN — SODIUM CHLORIDE, PRESERVATIVE FREE 10 ML: 5 INJECTION INTRAVENOUS at 21:22

## 2022-01-31 RX ADMIN — BUDESONIDE AND FORMOTEROL FUMARATE DIHYDRATE 2 PUFF: 80; 4.5 AEROSOL RESPIRATORY (INHALATION) at 08:37

## 2022-01-31 RX ADMIN — PREGABALIN 200 MG: 100 CAPSULE ORAL at 08:37

## 2022-01-31 RX ADMIN — OXYCODONE HYDROCHLORIDE 5 MG: 5 TABLET ORAL at 08:37

## 2022-01-31 RX ADMIN — DICYCLOMINE HYDROCHLORIDE 10 MG: 10 CAPSULE ORAL at 16:15

## 2022-01-31 RX ADMIN — OXYCODONE HYDROCHLORIDE 5 MG: 5 TABLET ORAL at 03:30

## 2022-01-31 RX ADMIN — OXYCODONE HYDROCHLORIDE 5 MG: 5 TABLET ORAL at 16:16

## 2022-01-31 RX ADMIN — OXYCODONE HYDROCHLORIDE 5 MG: 5 TABLET ORAL at 12:27

## 2022-01-31 RX ADMIN — SODIUM CHLORIDE, PRESERVATIVE FREE 10 ML: 5 INJECTION INTRAVENOUS at 08:38

## 2022-01-31 RX ADMIN — CEFAZOLIN 2000 MG: 10 INJECTION, POWDER, FOR SOLUTION INTRAVENOUS at 17:33

## 2022-01-31 RX ADMIN — OXYCODONE HYDROCHLORIDE 5 MG: 5 TABLET ORAL at 21:21

## 2022-01-31 RX ADMIN — VENLAFAXINE HYDROCHLORIDE 150 MG: 150 CAPSULE, EXTENDED RELEASE ORAL at 08:37

## 2022-01-31 RX ADMIN — INSULIN LISPRO 6 UNITS: 100 INJECTION, SOLUTION INTRAVENOUS; SUBCUTANEOUS at 16:16

## 2022-01-31 RX ADMIN — PREGABALIN 200 MG: 100 CAPSULE ORAL at 21:24

## 2022-01-31 RX ADMIN — DICYCLOMINE HYDROCHLORIDE 10 MG: 10 CAPSULE ORAL at 21:28

## 2022-01-31 ASSESSMENT — ENCOUNTER SYMPTOMS
EYE DISCHARGE: 0
ABDOMINAL DISTENTION: 0
BACK PAIN: 1
APNEA: 0
SHORTNESS OF BREATH: 1
COUGH: 0
PHOTOPHOBIA: 0
COLOR CHANGE: 1
ABDOMINAL PAIN: 1

## 2022-01-31 ASSESSMENT — PAIN DESCRIPTION - FREQUENCY
FREQUENCY: CONTINUOUS
FREQUENCY: INTERMITTENT
FREQUENCY: INTERMITTENT

## 2022-01-31 ASSESSMENT — PAIN SCALES - GENERAL
PAINLEVEL_OUTOF10: 10
PAINLEVEL_OUTOF10: 10
PAINLEVEL_OUTOF10: 9
PAINLEVEL_OUTOF10: 9
PAINLEVEL_OUTOF10: 10
PAINLEVEL_OUTOF10: 6
PAINLEVEL_OUTOF10: 6
PAINLEVEL_OUTOF10: 10
PAINLEVEL_OUTOF10: 5
PAINLEVEL_OUTOF10: 10
PAINLEVEL_OUTOF10: 10

## 2022-01-31 ASSESSMENT — PAIN DESCRIPTION - ONSET
ONSET: GRADUAL
ONSET: ON-GOING
ONSET: AWAKENED FROM SLEEP

## 2022-01-31 ASSESSMENT — PAIN DESCRIPTION - DESCRIPTORS
DESCRIPTORS: ACHING;DISCOMFORT
DESCRIPTORS: ACHING;NAGGING
DESCRIPTORS: ACHING;CRUSHING
DESCRIPTORS: ACHING;DISCOMFORT

## 2022-01-31 ASSESSMENT — PAIN DESCRIPTION - PAIN TYPE
TYPE: ACUTE PAIN;SURGICAL PAIN

## 2022-01-31 ASSESSMENT — PAIN DESCRIPTION - PROGRESSION
CLINICAL_PROGRESSION: GRADUALLY IMPROVING
CLINICAL_PROGRESSION: NOT CHANGED

## 2022-01-31 ASSESSMENT — PAIN DESCRIPTION - LOCATION
LOCATION: FOOT
LOCATION: GENERALIZED
LOCATION: FOOT
LOCATION: FOOT

## 2022-01-31 ASSESSMENT — PAIN DESCRIPTION - ORIENTATION: ORIENTATION: RIGHT

## 2022-01-31 ASSESSMENT — PAIN - FUNCTIONAL ASSESSMENT
PAIN_FUNCTIONAL_ASSESSMENT: PREVENTS OR INTERFERES SOME ACTIVE ACTIVITIES AND ADLS
PAIN_FUNCTIONAL_ASSESSMENT: PREVENTS OR INTERFERES SOME ACTIVE ACTIVITIES AND ADLS

## 2022-01-31 NOTE — PROGRESS NOTES
Physical Therapy  Facility/Department: [address] ORTHO/MED SURG  Daily Treatment Note  NAME: Ion Perez  : 1965  MRN: [id number]    Date of Service: 2022    Discharge Recommendations:  Patient would benefit from continued therapy after discharge   PT Equipment Recommendations  Equipment Needed: No (CTA)    Assessment   Body structures, Functions, Activity limitations: Decreased functional mobility ; Decreased strength;Decreased endurance; Increased pain  Assessment: Pt amb 15ft with RW Doron. Poor maintenance of NWB RLE t/o. Unsafe to return to prior living arrangements at this time d/t mobility deficits. Pt would benefit from aggressive PT after d/c to address deficits. Prognosis: Good  PT Education: Goals;PT Role;Plan of Care  Barriers to Learning: impulsive, dec safe awareness  REQUIRES PT FOLLOW UP: Yes  Activity Tolerance  Activity Tolerance: Patient limited by fatigue;Patient limited by pain     Patient Diagnosis(es): The primary encounter diagnosis was Right foot infection. A diagnosis of Elevated d-dimer was also pertinent to this visit.      has a past medical history of Acid reflux, Acute cystitis with hematuria, Acute non-recurrent maxillary sinusitis, Asthma, Bipolar 1 disorder ([address] Bipolar disorder, mixed ([address] BMI 34.0-34.9,adult, Cannabis use disorder, severe, dependence ([address] Cerebrovascular accident (CVA) ([address] Chest pain, Chronic renal insufficiency, Cocaine abuse ([address] COVID-19 virus RNA test result unknown, DDD (degenerative disc disease), cervical, Diabetes mellitus ([address] Dizziness, Fibromyalgia, History of stroke, Homicidal ideation, Hyperglycemia, Hypertension, Hypotension, IDDM (insulin dependent diabetes mellitus), Lupus ([address] Migraine, Neuropathy, Neuropathy, Polysubstance abuse ([address] Post traumatic stress disorder (PTSD), Posttraumatic stress disorder, Recurrent depression ([address] Recurrent major depressive disorder, in partial remission ([address] Screening mammogram, encounter for, Severe recurrent major depression with psychotic features ([facility] Severe recurrent major depression without psychotic features ([facility] [facility] Stroke (cerebrum) ([facility] [address] [facility] Stroke ([facility] [address] Suicidal ideation, Suicidal intent, Vitamin D deficiency, and White matter changes. has a past surgical history that includes Abscess Drainage; chest tube insertion; Abdomen surgery; Cataract removal with implant (Bilateral); LASIK (Bilateral); Finger amputation (Left, 03/13/2021); Hand surgery (Right, 09/14/2021); Hand surgery (Right, 09/15/2021); Finger amputation (Right, 10/11/2021); Foot surgery (Right, 01/27/2022); and Foot Debridement (Right, 1/27/2022). Restrictions  Restrictions/Precautions  Restrictions/Precautions: Weight Bearing,Up as Tolerated  Required Braces or Orthoses?: Yes (Sx shoe)  Lower Extremity Weight Bearing Restrictions  Right Lower Extremity Weight Bearing: Non Weight Bearing  Left Lower Extremity Weight Bearing: Weight Bearing As Tolerated  Upper Extremity Weight Bearing Restrictions  Other: Non weight bearing to right foot. WBAT to left foot with Sx shoe per podiatry note 1/28/22  Subjective   General  Chart Reviewed: Yes  Response To Previous Treatment: Patient with no complaints from previous session. Family / Caregiver Present: No  Subjective  Subjective: RN and pt agreeable to PT. Pt supine in bed at start of session with 10/10 c/o pain. . Surgical shoe donned to L foot prior to mobility.   Pain Screening  Patient Currently in Pain: Yes  Pain Assessment  Pain Assessment: 0-10  Pain Level: 10  Pain Type: Acute pain;Surgical pain  Pain Location: Generalized  Pain Descriptors: Aching;Discomfort  Vital Signs  Patient Currently in Pain: Yes       Orientation  Orientation  Overall Orientation Status: Within Normal Limits  Cognition   Cognition  Overall Cognitive Status: Exceptions  Safety Judgement: Decreased awareness of need for assistance;Decreased awareness of need for safety  Sequencing: Requires cues for some  Cognition Comment: Pt impulsive, standing up without warning. Pt aware of WB status, however poor return with maintenance of NWB RLE t/o amb. Objective   Bed mobility  Supine to Sit: Contact guard assistance  Sit to Supine: Contact guard assistance  Scooting: Contact guard assistance  Comment: HOB slightly elevated. Pt sat EOB ~10min. Transfers  Sit to Stand: Minimal Assistance  Stand to sit: Minimal Assistance  Bed to Chair: Unable to assess  Comment: Pt performed STS from bed with RW for UE support, poor return for hand placement. Fair return of WB restrictions during STS with cueing. Ambulation  Ambulation?: Yes  Ambulation 1  Surface: level tile  Device: Rolling Walker  Assistance: Minimal assistance  Quality of Gait: no LOB noted  Gait Deviations: Shuffles  Distance: 15ft  Comments: Pt amb from one side of bed to the other with RW Doron. Pt with poor return on WB restrictions despite max cueing t/o. Doron throughout for safety and RW maintenance. Stairs/Curb  Stairs?: No     Balance  Posture: Good  Sitting - Static: Good  Sitting - Dynamic: Fair;+  Standing - Static: Fair;-  Standing - Dynamic: Poor  Comments: Standing balance assessed with RW. Exercises  Seated LE exercise program: Long Arc Quads, hip abduction/adduction, heel/toe raises, and marches. Reps: x10  Comments:  Seated exercises performed while seated EOB.      AM-PAC Score  -PAC Inpatient Mobility Raw Score : 15 (01/31/22 1151)  -PAC Inpatient T-Scale Score : 39.45 (01/31/22 1151)  Mobility Inpatient CMS 0-100% Score: 57.7 (01/31/22 1151)  Mobility Inpatient CMS G-Code Modifier : CK (01/31/22 1151)          Goals  Short term goals  Time Frame for Short term goals: 14 visits  Short term goal 1: transfers with SBA and RW  Short term goal 2: amb 75 ft with a RW x SBA with NWB RLE, WBAT LLE with sx shoe  Short term goal 3: to be independent with bed mobility  Short term goal 4: 20 min exercise program x SBA  Patient Goals   Patient goals : Return home    Plan    Plan  Times per week: 5-6x wk  Times per day: Daily  Current Treatment Recommendations: Strengthening,Functional Mobility Training,Gait Training,Safety Education & Training,Endurance Training,Pain Management,Balance Edwinna Pippins Exercise Program,Neuromuscular Re-education,Transfer Training,Patient/Caregiver Education & Training,ADL/Self-care Training  Safety Devices  Type of devices: All fall risk precautions in place,Gait belt,Call light within reach,Left in bed,Bed alarm in place,Nurse notified  Restraints  Initially in place: No     Therapy Time   Individual Concurrent Group Co-treatment   Time In 1120         Time Out 1147         Minutes 27         Timed Code Treatment Minutes: Arturo    Treatment performed by Student PTA under the supervision of co-signing PTA who agrees with all treatment and documentation.    Hazel Pritchett, PTA

## 2022-01-31 NOTE — PLAN OF CARE
POC reviewed with patient, understanding verbalized. ANOx4, VSS on RA. She is up to the [facility] with a standby assist, non-weight bearing to the RLE. R foot remains dressed per orders, changed x1 per MD. Bottom of L foot has a bandaid over wound, C/D/I. Managing her pain with PRN oxy. She is receiving ABX through PIV, tolerating well and no s/s of infiltration. 1 small BM tonight, UOP clear yellow and adequate. Regular diet tolerated well, continuing to educate on diabetic diet restrictions. Bed locked in lowest position, call bell in reach, frequently rounded Q2 for safety. WCTM.       Madhav Marino RN    Vitals:    01/30/22 2016   BP: 117/83   Pulse: 91   Resp: 16   Temp: 97.7 °F (36.5 °C)   SpO2: 95%     Problem: Falls - Risk of:  Goal: Will remain free from falls  Description: Will remain free from falls  Outcome: Ongoing     Problem: Pain:  Goal: Control of acute pain  Description: Control of acute pain  Outcome: Ongoing     Problem: Musculor/Skeletal Functional Status  Goal: Highest potential functional level  Outcome: Ongoing     Problem: Nutrition  Goal: Optimal nutrition therapy  Outcome: Ongoing

## 2022-01-31 NOTE — PROGRESS NOTES
Polysubstance abuse ([facility] Post traumatic stress disorder (PTSD), Posttraumatic stress disorder, Recurrent depression ([facility] [facility] Recurrent major depressive disorder, in partial remission ([facility] [facility] Screening mammogram, encounter for, Severe recurrent major depression with psychotic features ([facility] [address] Severe recurrent major depression without psychotic features ([facility] [address] Stroke (cerebrum) ([facility] Stroke ([facility] [facility] Suicidal ideation, Suicidal intent, Vitamin D deficiency, and White matter changes. PAST SURGICAL HISTORY:      has a past surgical history that includes Abscess Drainage; chest tube insertion; Abdomen surgery; Cataract removal with implant (Bilateral); LASIK (Bilateral); Finger amputation (Left, 03/13/2021); Hand surgery (Right, 09/14/2021); Hand surgery (Right, 09/15/2021); Finger amputation (Right, 10/11/2021); Foot surgery (Right, 01/27/2022); and Foot Debridement (Right, 1/27/2022). SOCIAL HISTORY:      reports that she has never smoked. She has never used smokeless tobacco. She reports previous alcohol use. She reports current drug use. Drugs: Marijuana (Weed) and Cocaine. FAMILY HISTORY:     family history includes Diabetes in her brother, father, mother, and sister; No Known Problems in her sister; Other in her son; Stroke in her mother. HOME MEDICATIONS:      Prior to Admission medications    Medication Sig Start Date End Date Taking? Authorizing Provider   ertapenem [facility]) infusion Infuse 1,000 mg intravenously every 24 hours for 14 days Compound per protocol. 1/30/22 2/13/22 Yes Maylin Rubin MD   pregabalin (LYRICA) 200 MG capsule Take 1 capsule by mouth 2 times daily for 30 days.  1/20/22 2/19/22  Jaja Angelo MD   insulin glargine (LANTUS SOLOSTAR) 100 UNIT/ML injection pen Inject 30 Units into the skin 2 times daily 1/20/22   Jaja Angelo MD   fluticasone [facility]) 50 MCG/ACT nasal spray 1 spray by Each Nostril route daily 12/22/21   Sushila Mejia MD   acetaminophen (TYLENOL) 500 MG tablet Take 2 tablets by mouth 3 times daily as needed for Pain 12/22/21   Abran Duval MD   dicyclomine (BENTYL) 10 MG capsule Take 1 capsule by mouth 4 times daily 12/17/21   Abran Duval MD   metFORMIN (GLUCOPHAGE) 1000 MG tablet Take 1 tablet by mouth 2 times daily (with meals) 12/17/21   Abran Duval MD   mirtazapine (REMERON) 7.5 MG tablet Take 1 tablet by mouth nightly 12/17/21   Abran Duval MD   pantoprazole (PROTONIX) 20 MG tablet Take 1 tablet by mouth 2 times daily (before meals) 12/17/21   Abran Duval MD   traZODone (DESYREL) 150 MG tablet Take 1 tablet by mouth nightly 12/17/21   Abran Duval MD   venlafaxine (EFFEXOR XR) 150 MG extended release capsule Take 1 capsule by mouth daily (with breakfast) 12/17/21   Abran Duval MD   ibuprofen (ADVIL;MOTRIN) 800 MG tablet Take 1 tablet by mouth every 8 hours as needed for Pain 11/15/21 11/29/21  Michelle Michel MD   Lancets MISC 1 each by Does not apply route 3 times daily 11/15/21   Michelle Michel MD   Alcohol Swabs 70 % PADS Use 1 swab 3 times daily as needed 11/15/21   Michelle Michel MD   blood glucose monitor strips Test 3 times a day & as needed for symptoms of irregular blood glucose. Dispense sufficient amount for indicated testing frequency plus additional to accommodate PRN testing needs.  11/8/21   Charissa Chairez MD   Lancets MISC 1 each by Does not apply route 3 times daily 11/8/21   Charissa Chairez MD   Insulin Pen Needle ([employer] PEN NEEDLES 31G) 31G X 8 MM MISC 1 each by Does not apply route daily 11/8/21   Chris Heller MD   FREESTYLE LITE strip 1 each by Does not apply route 3 times daily 11/11/20   Abran Duval MD   albuterol sulfate  (90 Base) MCG/ACT inhaler Inhale 2 puffs into the lungs every 4 hours as needed for Wheezing 10/20/20 9/22/21  Abran Duval MD   FLOVENT  MCG/ACT inhaler Inhale 2 puffs into the lungs 2 times daily 10/20/20   Abran Duval MD budesonide-formoterol (SYMBICORT) 80-4.5 MCG/ACT AERO Inhale 2 puffs into the lungs 2 times daily 10/20/20   Wendy Tyler MD       ALLERGIES:     Bactrim [sulfamethoxazole-trimethoprim] and Adhesive tape      OBJECTIVE:       Vitals:    01/30/22 0927 01/30/22 2016 01/31/22 0600 01/31/22 0705   BP: (!) 108/57 117/83  129/70   Pulse: 78 91  87   Resp: 20 16  14   Temp: 98.4 °F (36.9 °C) 97.7 °F (36.5 °C)  99.1 °F (37.3 °C)   TempSrc: Oral Oral  Oral   SpO2: 93% 95%  97%   Weight:   242 lb 8 oz (110 kg)    Height:             Intake/Output Summary (Last 24 hours) at 1/31/2022 0842  Last data filed at 1/31/2022 0416  Gross per 24 hour   Intake 1200 ml   Output 1400 ml   Net -200 ml       PHYSICAL EXAM:  General Appearance  Alert , awake , not in acute distress  HEENT - Head is normocephalic, atraumatic. Lungs - Bilateral equal air entry , no wheezes, rales or rhonchi, aeration good  Cardiovascular - Heart sounds are normal.  Regular rhythm, normal rate without murmur, gallop or rub.   Abdomen - Soft, nontender, nondistended, no masses or organomegaly  Neurologic - There are no new focal motor or sensory deficits  Skin - No bruising or bleeding on exposed skin area  Extremities - No cyanosis, clubbing or edema      DIAGNOSTICS:     Laboratory Testing:    Recent Results (from the past 24 hour(s))   POC Glucose Fingerstick    Collection Time: 01/30/22 11:18 AM   Result Value Ref Range    POC Glucose 156 (H) 65 - 105 mg/dL   POC Glucose Fingerstick    Collection Time: 01/30/22  4:10 PM   Result Value Ref Range    POC Glucose 189 (H) 65 - 105 mg/dL   POC Glucose Fingerstick    Collection Time: 01/30/22  8:00 PM   Result Value Ref Range    POC Glucose 165 (H) 65 - 105 mg/dL   Basic Metabolic Panel w/ Reflex to MG    Collection Time: 01/31/22  4:31 AM   Result Value Ref Range    Glucose 146 (H) 70 - 99 mg/dL    BUN 14 6 - 20 mg/dL    CREATININE 0.86 0.50 - 0.90 mg/dL    Bun/Cre Ratio NOT REPORTED 9 - 20    Calcium 8.4 (L) 8.6 - 10.4 mg/dL    Sodium 134 (L) 135 - 144 mmol/L    Potassium 4.1 3.7 - 5.3 mmol/L    Chloride 101 98 - 107 mmol/L    CO2 23 20 - 31 mmol/L    Anion Gap 10 9 - 17 mmol/L    GFR Non-African American >60 >60 mL/min    GFR African American >60 >60 mL/min    GFR Comment          GFR Staging NOT REPORTED    CBC auto differential    Collection Time: 01/31/22  4:31 AM   Result Value Ref Range    WBC 10.2 3.5 - 11.3 k/uL    RBC 2.33 (L) 3.95 - 5.11 m/uL    Hemoglobin 7.4 (L) 11.9 - 15.1 g/dL    Hematocrit 23.3 (L) 36.3 - 47.1 %    .0 82.6 - 102.9 fL    MCH 31.8 25.2 - 33.5 pg    MCHC 31.8 28.4 - 34.8 g/dL    RDW 14.2 11.8 - 14.4 %    Platelets 801 (H) 386 - 453 k/uL    MPV 10.7 8.1 - 13.5 fL    NRBC Automated 0.6 (H) 0.0 per 100 WBC    Differential Type NOT REPORTED     WBC Morphology NOT REPORTED     RBC Morphology NOT REPORTED     Platelet Estimate NOT REPORTED     Seg Neutrophils 65 36 - 65 %    Lymphocytes 25 24 - 43 %    Monocytes 8 3 - 12 %    Eosinophils % 2 1 - 4 %    Basophils 0 0 - 2 %    Immature Granulocytes 1 (H) 0 %    Segs Absolute 6.60 1.50 - 8.10 k/uL    Absolute Lymph # 2.51 1.10 - 3.70 k/uL    Absolute Mono # 0.82 0.10 - 1.20 k/uL    Absolute Eos # 0.16 0.00 - 0.44 k/uL    Basophils Absolute 0.04 0.00 - 0.20 k/uL    Absolute Immature Granulocyte 0.08 0.00 - 0.30 k/uL   C-REACTIVE PROTEIN    Collection Time: 01/31/22  4:31 AM   Result Value Ref Range    CRP 53.3 (H) 0.0 - 5.0 mg/L   POC Glucose Fingerstick    Collection Time: 01/31/22  7:44 AM   Result Value Ref Range    POC Glucose 69 65 - 105 mg/dL   POC Glucose Fingerstick    Collection Time: 01/31/22  8:31 AM   Result Value Ref Range    POC Glucose 123 (H) 65 - 105 mg/dL         Imaging/Diagonstics:      Current Facility-Administered Medications   Medication Dose Route Frequency Provider Last Rate Last Admin    ceFAZolin (ANCEF) 2000 mg in dextrose 5 % 50 mL IVPB  2,000 mg IntraVENous Rob Guzmán MD   Stopped at 01/31/22 9055  insulin glargine (LANTUS) injection vial 25 Units  25 Units SubCUTAneous BID Irina Hensley MD   25 Units at 01/30/22 2002    [facility]) chewable tablet 80 mg  80 mg Oral Q6H PRN Irina Hensley MD   80 mg at 01/28/22 1625    diphenhydrAMINE (BENADRYL) injection 25 mg  25 mg IntraVENous Q8H PRN Etta Rojas MD   25 mg at 01/30/22 2002    enoxaparin (LOVENOX) injection 30 mg  30 mg SubCUTAneous BID Jacobo Dietrich, DPM   30 mg at 01/28/22 0830    albuterol sulfate  (90 Base) MCG/ACT inhaler 2 puff  2 puff Inhalation Q4H PRN Jacobo Dietrich, DPM   2 puff at 01/24/22 2016    oxyCODONE (ROXICODONE) immediate release tablet 5 mg  5 mg Oral Q4H PRN Jacobo Dietrich, DPM   5 mg at 01/31/22 0837    budesonide-formoterol (SYMBICORT) 80-4.5 MCG/ACT inhaler 2 puff  2 puff Inhalation BID Jacobo Dietrich, DPM   2 puff at 01/31/22 3401    dicyclomine (BENTYL) capsule 10 mg  10 mg Oral 4x Daily Kikoella Offer, DPM   10 mg at 01/31/22 0837    fluticasone (FLONASE) 50 MCG/ACT nasal spray 1 spray  1 spray Each Nostril Daily Jacobo Dietrich, DPM   1 spray at 01/30/22 0800    mirtazapine (REMERON) tablet 7.5 mg  7.5 mg Oral Nightly Kikoella Offer, DPM   7.5 mg at 01/30/22 2005    pantoprazole (PROTONIX) tablet 20 mg  20 mg Oral BID AC Jacobo Dietrich, DPM   20 mg at 01/31/22 6712    pregabalin (LYRICA) capsule 200 mg  200 mg Oral BID Kikoella Karlo, DPM   200 mg at 01/31/22 3373    traZODone (DESYREL) tablet 150 mg  150 mg Oral Nightly Kikoella Karlo, DPM   150 mg at 01/30/22 2005    venlafaxine (EFFEXOR XR) extended release capsule 150 mg  150 mg Oral Daily with breakfast Gonzella Offer, DPM   150 mg at 01/31/22 5383    sodium chloride flush 0.9 % injection 5-40 mL  5-40 mL IntraVENous 2 times per day Gonzella Offer, DPM   10 mL at 01/31/22 1005    sodium chloride flush 0.9 % injection 5-40 mL  5-40 mL IntraVENous PRN Kikoella Offer, DPM        0.9 % sodium chloride infusion  25 mL IntraVENous PRN Jacobo Offer, DPM        ondansetron (ZOFRAN-ODT) disintegrating tablet 4 mg 4 mg Oral Q8H PRN Elba Achilles, DPM   4 mg at 01/29/22 1201    Or    ondansetron (ZOFRAN) injection 4 mg  4 mg IntraVENous Q6H PRN Elba Achilles, DPM        polyethylene glycol (GLYCOLAX) packet 17 g  17 g Oral Daily PRN Elba Achilles, DPM        acetaminophen (TYLENOL) tablet 650 mg  650 mg Oral Q6H PRN Elba Achilles, DPM   650 mg at 01/30/22 9834    Or    acetaminophen (TYLENOL) suppository 650 mg  650 mg Rectal Q6H PRN Ebla Achilles, DPM        potassium chloride (KLOR-CON M) extended release tablet 40 mEq  40 mEq Oral PRN Elba Achilles, DPM        Or    potassium bicarb-citric acid (EFFER-K) effervescent tablet 40 mEq  40 mEq Oral PRN Elba Achilles, DPM        Or    potassium chloride 10 mEq/100 mL IVPB (Peripheral Line)  10 mEq IntraVENous PRN Elba Achilles, DPM        insulin lispro (HUMALOG) injection vial 0-12 Units  0-12 Units SubCUTAneous TID WC Elba Achilles, DPM   2 Units at 01/30/22 1612    insulin lispro (HUMALOG) injection vial 0-6 Units  0-6 Units SubCUTAneous Nightly Elba Achilles, DPM   1 Units at 01/30/22 2001    glucose (GLUTOSE) 40 % oral gel 15 g  15 g Oral PRN Elba Achilles, DPM   15 g at 01/26/22 0350    dextrose 50 % IV solution  12.5 g IntraVENous PRN Elba Achilles, DPM        glucagon (rDNA) injection 1 mg  1 mg IntraMUSCular PRN Elba Achilles, DPM        dextrose 5 % solution  100 mL/hr IntraVENous PRN Elba Achilles, DPM           ASSESSMENT:     Active Problems:    Type 2 diabetes mellitus without complication, with long-term current use of insulin ([facility])    Right foot infection    Cellulitis and abscess of toe of right foot    Abscess of right foot    Bilateral cellulitis of lower leg related to related to  uncontrolled diabetes  Resolved Problems:    * No resolved hospital problems. *      PLAN:     RLE foot wound/infection  - Tmax 100.4  - leukocytosis, elevated CRP  - X-ray revealed no foreign body in foot. However moderate right foot swelling.  - S/P I&D of right foot and left foot debridement.    -S/P Right foot debridment - Switch to Ancef  -Rocephin on discharge until 2/14, followed by po Keflex. - pain control/  - podiatry consulted, follow up recommendations  -ID consulted- appreciate recs. - Elevated D-dime  -Venous doppler shows chronic DVT  -Continue DVT prophylaxis, lovenos        UTI   Cx: klebsiella  S/P Cefepime  Acef 2g Q8hrs     DM2  - A1C 10  -POCT glucose  -on lantus 25U BID  - medium dose ISS  - hypoglycemia protocol     Bipolar disorder  - stable  - continue remeron 7.5 mg PO nightly  - effexor  mg po daily  - trazadone 150 mg PO nightly     Plan will be discussed with the attending, Eddie Gibbs MD  Family Medicine Resident  1/31/2022 8:42 AM            Please note that this chart was generated using voice recognition Dragon dictation software.   Although every effort was made to ensure the accuracy of this automated transcription, some errors in transcription may have occurred

## 2022-01-31 NOTE — PROGRESS NOTES
Kavita Chavez was evaluated today and a DME order was entered for a wheeled walker because she requires this to successfully complete daily living tasks of ambulating. A wheeled walker is necessary due to the patient's unsteady gait, upper body weakness, and inability to  an ambulation device; and she can ambulate only by pushing a walker instead of a lesser assistive device such as a cane, crutch, or standard walker. The need for this equipment was discussed with the patient and she understands and is in agreement. Kavita Chavez was evaluated today and a DME order was entered for a standard wheelchair because she requires this to successfully complete daily living tasks of ambulating. A standard manual wheelchair is necessary due to patient's impaired ambulation and mobility restrictions and would be unable to resolve these daily living tasks using a cane or walker. The patient is capable of using a standard wheelchair safely in their home and can maneuver within their home with adequate access. There is a caregiver available to provide necessary assistance. The need for this equipment was discussed with the patient and she understands, is in agreement, and has not expressed an unwillingness to use the wheelchair.         Katerina Rubio  Resident physician PGY1   Family Medicine     1/31/2022  12:21 PM

## 2022-01-31 NOTE — CONSULTS
Physical Medicine & Rehabilitation  Consult Note      Admitting Physician:  Daniel Lockwood MD    Primary Care Provider:  Virginia Reyes MD     Reason for Consult:  Acute Inpatient Rehabilitation    Chief Complaint:  Right lower limb pain and edema    History of Present Illness:  Referring Provider is requesting an evaluation for appropriate placement upon discharge from acute care. History from chart review and patient. Ruben Briseno is a 49 y.o. female with history of CVA, lupus, HTN, type 2 diabetes, CKD, asthma, GERD, bipolar disorder, PTSD, fibromyalgia, and polysubstance abuse admitted to [facility] on 1/22/2022. She initially presented with right lower limb pain and edema for 6 days after stepping on glass. She was found to have abscess and cellulitis of the right foot and underwent I&D on 1/27/22 (Dr. Harshad Rowley). She is weight-bearing to the heel of the right foot. She was also found to have cellulitis of the left foot. She is currently on ancef with plan for discharge on ceftriaxone until 2/14/22 followed by keflex. She currently reports diffuse body pain, including foot pain, back pain, lower limb pain, abdominal pain, and headache. She also notes shortness of breath and chronic numbness/tingling related to neuropathy. Review of Systems:  Review of Systems   Constitutional: Negative for fever. Respiratory: Positive for shortness of breath. Gastrointestinal: Positive for abdominal pain. Musculoskeletal: Positive for back pain. +lower limb pain, foot pain   Neurological: Positive for sensory change (chronic) and headaches.       Premorbid function:  Independent    Current function:    PT:  Restrictions/Precautions: Weight Bearing,Up as Tolerated  Right Lower Extremity Weight Bearing: Non Weight Bearing  Left Lower Extremity Weight Bearing: Weight Bearing As Tolerated   Transfers  Sit to Stand: Minimal Assistance  Stand to sit: Minimal Assistance  Bed to Chair: Unable to assess  Comment: Pt performed STS from bed with RW for UE support, poor return for hand placement. Fair return of WB restrictions during STS with cueing. Ambulation 1  Surface: level tile  Device: Rolling Walker  Assistance: Minimal assistance  Quality of Gait: no LOB noted  Gait Deviations: Shuffles  Distance: 15ft  Comments: Pt amb from one side of bed to the other with RW Doron. Pt with poor return on WB restrictions despite max cueing t/o. Doron throughout for safety and RW maintenance. Transfers  Sit to Stand: Minimal Assistance  Stand to sit: Minimal Assistance  Bed to Chair: Unable to assess  Comment: Pt performed STS from bed with RW for UE support, poor return for hand placement. Fair return of WB restrictions during STS with cueing. Ambulation  Ambulation?: Yes  More Ambulation?: No  Ambulation 1  Surface: level tile  Device: Rolling Walker  Assistance: Minimal assistance  Quality of Gait: no LOB noted  Gait Deviations: Shuffles  Distance: 15ft  Comments: Pt amb from one side of bed to the other with RW Doron. Pt with poor return on WB restrictions despite max cueing t/o. Doron throughout for safety and RW maintenance. Surface: level tile  Ambulation 1  Surface: level tile  Device: Rolling Walker  Assistance: Minimal assistance  Quality of Gait: no LOB noted  Gait Deviations: Shuffles  Distance: 15ft  Comments: Pt amb from one side of bed to the other with RW Doron. Pt with poor return on WB restrictions despite max cueing t/o. Doron throughout for safety and RW maintenance. OT:   ADL  Feeding: Independent  Grooming: Independent,Setup  UE Bathing: Independent  LE Bathing: Minimal assistance,Increased time to complete  UE Dressing: Independent  LE Dressing: Increased time to complete,Maximum assistance  Toileting: Contact guard assistance,Increased time to complete  Additional Comments: Grooming sitting EOB- I after set up (shower cap/comb hair) sitting EOB.  LB dressing- max A to don L sx shoe d/t difficulty maintaining 4 figure tech. Balance  Sitting Balance: Independent (I sitting EOB for grooming)  Standing Balance: Minimal assistance (x2)   Standing Balance  Time: ~~2 minutes 2 times  Activity: static/dynamic/side steps to [facility]. Comment: using RW for support. Pt. lou GALLEGOS NWB to R LE, L sx shoe on during standing activity. Functional Mobility  Functional - Mobility Device: Rolling Walker  Activity: Other (~4 Side steps to [facility])  Assist Level: Minimal assistance (x2)  Functional Mobility Comments: Verbal cues for safety and RW management. Cues to stay on task, pt. singing/dancing while standing, demo 1 minor LOB, min A x 2 overall. Bed mobility  Rolling to Right: Stand by assistance  Supine to Sit: Contact guard assistance  Sit to Supine: Contact guard assistance  Scooting: Contact guard assistance  Comment: HOB slightly elevated  Transfers  Sit to stand: 2 Person assistance,Moderate assistance  Stand to sit: 2 Person assistance,Moderate assistance  Transfer Comments: Mod-min A x 2 + verbal cues for B hand placement, pt. lou ROPER carryover.                  SLP:      Past Medical History:        Diagnosis Date    Acid reflux 5/29/2017    Acute cystitis with hematuria 10/11/2016    Acute non-recurrent maxillary sinusitis 11/28/2017    Asthma     Bipolar 1 disorder ([address] 1/4/2018    Bipolar disorder, mixed ([address]     BMI 34.0-34.9,adult 11/28/2017    Cannabis use disorder, severe, dependence ([address] 12/19/2017    Cerebrovascular accident (CVA) ([address] 6/14/2017    Chest pain 11/5/2016    Chronic renal insufficiency     Cocaine abuse ([address] 1/5/2018    COVID-19 virus RNA test result unknown     DDD (degenerative disc disease), cervical     Diabetes mellitus ([address]     Dizziness 6/13/2017    Fibromyalgia     History of stroke 9/9/2017    Homicidal ideation 11/6/2017    Hyperglycemia     Hypertension     Hypotension 1/18/2019    IDDM (insulin dependent diabetes mellitus) 12/21/2015    Lupus [facility]     Migraine     Neuropathy     Neuropathy     Polysubstance abuse ([address] 9/17/2017    Post traumatic stress disorder (PTSD)     Posttraumatic stress disorder 5/29/2017    Recurrent depression ([address] 5/29/2017    Recurrent major depressive disorder, in partial remission ([address] 11/28/2017    Screening mammogram, encounter for 11/28/2017    Severe recurrent major depression with psychotic features ([address] 12/19/2017    Severe recurrent major depression without psychotic features ([address] 12/19/2017    Stroke (cerebrum) ([address] 6/14/2017    Stroke ([facility]     per chart notes    Suicidal ideation     Suicidal intent 3/10/2017    Vitamin D deficiency 11/28/2017    White matter changes 6/13/2017       Past Surgical History:        Procedure Laterality Date    ABDOMEN SURGERY      drain tube    ABSCESS DRAINAGE      right buttock    CATARACT REMOVAL WITH IMPLANT Bilateral     CHEST TUBE INSERTION      FINGER AMPUTATION Left 03/13/2021    LEFT RING FINER AMPUTATION performed by Fabiola Prado MD at [facility] Right 10/11/2021    AMPUTATION RIGHT INDEX FINGER performed by Faibola Prado MD at [place] [address] Right 1/27/2022    FOOT ABSCESS INCISION AND DRAINAGE  (PULSE LAVAGE, CULTURE SWABS) performed by Consuelo Galvan DPM at [address] [place] [address] Right 01/27/2022    FOOT ABSCESS INCISION AND DRAINAGE (PULSE LAVAGE, CULTURE SWABS) - Right    HAND SURGERY Right 09/14/2021    I&D INDEX FINGER performed by Fabiola Prado MD at [address] [place] [address] Right 09/15/2021    I&D INDEX FINGER performed by Fabiola Prado MD at [address] [place] [address] Bilateral        Allergies:     Allergies   Allergen Reactions    Bactrim [Sulfamethoxazole-Trimethoprim] Swelling    Adhesive Tape Rash        Current Medications:   Current Facility-Administered Medications: ceFAZolin (ANCEF) 2000 mg in dextrose 5 % 50 mL IVPB, 2,000 mg, IntraVENous, Q8H  insulin glargine (LANTUS) injection vial 25 Units, 25 Units, SubCUTAneous, BID  simethicone (MYLICON) chewable tablet 80 mg, 80 mg, Oral, Q6H PRN  diphenhydrAMINE (BENADRYL) injection 25 mg, 25 mg, IntraVENous, Q8H PRN  enoxaparin (LOVENOX) injection 30 mg, 30 mg, SubCUTAneous, BID  albuterol sulfate  (90 Base) MCG/ACT inhaler 2 puff, 2 puff, Inhalation, Q4H PRN  oxyCODONE (ROXICODONE) immediate release tablet 5 mg, 5 mg, Oral, Q4H PRN  budesonide-formoterol (SYMBICORT) 80-4.5 MCG/ACT inhaler 2 puff, 2 puff, Inhalation, BID  dicyclomine (BENTYL) capsule 10 mg, 10 mg, Oral, 4x Daily  fluticasone (FLONASE) 50 MCG/ACT nasal spray 1 spray, 1 spray, Each Nostril, Daily  mirtazapine (REMERON) tablet 7.5 mg, 7.5 mg, Oral, Nightly  pantoprazole (PROTONIX) tablet 20 mg, 20 mg, Oral, BID AC  pregabalin (LYRICA) capsule 200 mg, 200 mg, Oral, BID  traZODone (DESYREL) tablet 150 mg, 150 mg, Oral, Nightly  venlafaxine (EFFEXOR XR) extended release capsule 150 mg, 150 mg, Oral, Daily with breakfast  sodium chloride flush 0.9 % injection 5-40 mL, 5-40 mL, IntraVENous, 2 times per day  sodium chloride flush 0.9 % injection 5-40 mL, 5-40 mL, IntraVENous, PRN  0.9 % sodium chloride infusion, 25 mL, IntraVENous, PRN  ondansetron (ZOFRAN-ODT) disintegrating tablet 4 mg, 4 mg, Oral, Q8H PRN **OR** ondansetron (ZOFRAN) injection 4 mg, 4 mg, IntraVENous, Q6H PRN  polyethylene glycol (GLYCOLAX) packet 17 g, 17 g, Oral, Daily PRN  acetaminophen (TYLENOL) tablet 650 mg, 650 mg, Oral, Q6H PRN **OR** acetaminophen (TYLENOL) suppository 650 mg, 650 mg, Rectal, Q6H PRN  potassium chloride (KLOR-CON M) extended release tablet 40 mEq, 40 mEq, Oral, PRN **OR** potassium bicarb-citric acid (EFFER-K) effervescent tablet 40 mEq, 40 mEq, Oral, PRN **OR** potassium chloride 10 mEq/100 mL IVPB (Peripheral Line), 10 mEq, IntraVENous, PRN  insulin lispro (HUMALOG) injection vial 0-12 Units, 0-12 Units, SubCUTAneous, TID WC  insulin lispro (HUMALOG) injection vial 0-6 Units, 0-6 Units, SubCUTAneous, Nightly  glucose (GLUTOSE) 40 % oral gel 15 g, 15 g, Oral, PRN  dextrose 50 % IV solution, 12.5 g, IntraVENous, PRN  glucagon (rDNA) injection 1 mg, 1 mg, IntraMUSCular, PRN  dextrose 5 % solution, 100 mL/hr, IntraVENous, PRN    Family History:       Problem Relation Age of Onset    Diabetes Mother     Stroke Mother     Diabetes Father     Diabetes Sister     Diabetes Brother     Other Son         GSW    No Known Problems Sister        Social History:  Lives With: Alone  Type of Home: Apartment (4th floor)  Home Layout: One level  Home Access: Elevator,Ramped entrance  Bathroom Shower/Tub: Tub/Shower unit  Bathroom Toilet: Standard  Bathroom Equipment: Shower chair,Grab bars in shower  Home Equipment: Quad cane,Rolling walker  ADL Assistance: [address]: Independent  Homemaking Responsibilities: Yes  Meal Prep Responsibility: Primary  Laundry Responsibility: Primary (laundry is outside of complex)  Cleaning Responsibility: Primary  Shopping Responsibility: Primary  Ambulation Assistance: Independent  Transfer Assistance: Independent  Active : No  Patient's  Info: Friends and cabs  Mode of Transportation: Cab,Friends,Other (uber)  Occupation: On disability  Leisure & Hobbies: Stuff  Additional Comments: Obtained from initial evaluation, confirmed with pt.     Social History     Socioeconomic History    Marital status: Legally      Spouse name: None    Number of children: None    Years of education: None    Highest education level: None   Occupational History    None   Tobacco Use    Smoking status: Never Smoker    Smokeless tobacco: Never Used   Vaping Use    Vaping Use: Never used   Substance and Sexual Activity    Alcohol use: Not Currently    Drug use: Yes     Types: Marijuana Mardeen Mar), Cocaine     Comment: + Cocaine 2/2021 also, see Care Everywhere UDS    Sexual activity: Not Currently     Partners: Male   Other Topics Concern  None   Social History Narrative    None     Social Determinants of Health     Financial Resource Strain: High Risk    Difficulty of Paying Living Expenses: Hard   Food Insecurity: Food Insecurity Present    Worried About Running Out of Food in the Last Year: Often true    Kelin of Food in the Last Year: Often true   Transportation Needs: Unmet Transportation Needs    Lack of Transportation (Medical): Yes    Lack of Transportation (Non-Medical): Yes   Physical Activity: Inactive    Days of Exercise per Week: 0 days    Minutes of Exercise per Session: 0 min   Stress: Stress Concern Present    Feeling of Stress : Rather much   Social Connections:     Frequency of Communication with Friends and Family: Not on file    Frequency of Social Gatherings with Friends and Family: Not on file    Attends Hindu Services: Not on file    Active Member of [address] [place] [address] [employer] or Organizations: Not on file    Attends Club or Organization Meetings: Not on file    Marital Status: Not on file   Intimate Partner Violence:     Fear of Current or Ex-Partner: Not on file    Emotionally Abused: Not on file    Physically Abused: Not on file    Sexually Abused: Not on file   Housing Stability: High Risk    Unable to Pay for Housing in the Last Year: Yes    Number of [place] in the Last Year: 2    Unstable Housing in the Last Year: Yes       Physical Exam:  /83   Pulse 85   Temp 98.9 °F (37.2 °C) (Oral)   Resp 16   Ht 5' 6\" (1.676 m)   Wt 242 lb 8 oz (110 kg)   LMP  (LMP Unknown)   SpO2 98%   BMI 39.14 kg/m²     GEN: Well developed, well nourished, no acute distress  HEENT: NCAT. EOM grossly intact. Hearing grossly intact. Mucous membranes pink and moist.  RESP: Normal breath sounds with no wheezing, rales, or rhonchi. Respirations WNL and unlabored. CV: Regular rate and rhythm. No murmurs, rubs, or gallops. ABD: Soft, non-distended, BS+ and equal.  NEURO: Alert. Speech fluent.   Sensation to light touch intact. MSK:  Muscle tone and bulk are normal bilaterally. Strength 4/5 in all limbs. LIMBS: No edema in bilateral lower limbs. SKIN: Warm and dry with good turgor. PSYCH: Mood WNL. Affect WNL. Appropriately interactive. Diagnostics:    CBC:   Recent Labs     01/29/22  0612 01/30/22  0720 01/31/22 0431   WBC 7.8 9.4 10.2   RBC 2.42* 2.47* 2.33*   HGB 7.6* 7.5* 7.4*   HCT 24.3* 24.8* 23.3*   .4 100.4 100.0   RDW 13.7 13.9 14.2    401 534*     BMP:   Recent Labs     01/29/22  0612 01/30/22  0720 01/31/22 0431    139 134*   K 4.5 4.3 4.1    105 101   CO2 23 24 23   BUN 15 14 14   CREATININE 0.72 0.82 0.86   GLUCOSE 120* 107* 146*      HbA1c:   Lab Results   Component Value Date    LABA1C 10.0 01/20/2022     BNP: No results for input(s): BNP in the last 72 hours. PT/INR: No results for input(s): PROTIME, INR in the last 72 hours. APTT: No results for input(s): APTT in the last 72 hours. CARDIAC ENZYMES: No results for input(s): CKMB, CKMBINDEX, TROPONINT in the last 72 hours. Invalid input(s): CKTOTAL;3  FASTING LIPID PANEL:  Lab Results   Component Value Date    CHOL 275 (H) 12/30/2020    HDL 80 12/30/2020    TRIG 246 (H) 12/30/2020     LIVER PROFILE: No results for input(s): AST, ALT, ALB, BILIDIR, BILITOT, ALKPHOS in the last 72 hours. Radiology:  XR ABDOMEN (KUB) (SINGLE AP VIEW)   Final Result   Increased stool burden seen diffusely throughout the colon suggesting   clinical presentation of constipation. Phleboliths seen on both sides of the   pelvis. CT FOOT RIGHT W CONTRAST   Final Result   1. Shallow soft tissue ulceration plantar and medial to the 1st metatarsal   base with an underlying area of possible abscess versus phlegmon measuring   2.8 x 1.1 x 0.9 cm.   2. Extensive subcutaneous fat stranding compatible with cellulitis. No soft   tissue gas. 3. No evidence of osteomyelitis or other acute osseous abnormality.          US RENAL COMPLETE   Final Result Unremarkable renal ultrasound. MRI FOOT RIGHT WO CONTRAST   Final Result   Subcutaneous edema and swelling, most notably affecting the dorsum of the   foot, which may reflect underlying cellulitis. No discrete organized fluid   collection is seen within the limitations of a noncontrast study. No imaging features of acute osteomyelitis appreciated. MRI FOOT LEFT WO CONTRAST   Final Result   Limited evaluation due to patient motion artifact on several sequences. Skin thickening and subcutaneous edema seen within the plantar soft tissues   of the foot at the level of the proximal metatarsals, which may reflect   underlying cellulitis. Within the limitations of a noncontrast study, no   discrete organized fluid collection is seen. 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 VL DUP LOWER EXTREMITY VENOUS BILATERAL   Final Result      XR FOOT RIGHT (MIN 3 VIEWS)   Final Result   Right foot: No radiopaque foreign body. No fracture. Moderate soft tissue   swelling within the anterior aspect of plantar aspect of the foot   predominantly underlying meta tarsal heads. Left foot: Changes related to prior partial amputation of 4th and 5th ray as   described. No acute fracture. No radiopaque foreign body. Moderate soft   tissue swelling and plantar aspect of the foot. No discrete soft tissue ulcer   is noted. XR FOOT LEFT (MIN 3 VIEWS)   Final Result   Right foot: No radiopaque foreign body. No fracture. Moderate soft tissue   swelling within the anterior aspect of plantar aspect of the foot   predominantly underlying meta tarsal heads. Left foot: Changes related to prior partial amputation of 4th and 5th ray as   described. No acute fracture. No radiopaque foreign body. Moderate soft   tissue swelling and plantar aspect of the foot. No discrete soft tissue ulcer   is noted. Impression:    1. Right foot wound with abscess and cellulitis s/p I&D on 1/27  2. Left foot cellulitis  3. Anemia  4. Lupus  5. HTN  6. Type 2 diabetes  7. CKD  8. Asthma  9. GERD  10. Bipolar disorder  11. PTSD  12. Fibromyalgia  13. History of CVA  14. Polysubstance abuse  15. Obesity    Recommendations:    1. Diagnosis:  Right foot wound with abscess and cellulitis s/p I&D  2. Therapy: Has PT/OT needs  3. Medical Necessity: As above  4. Support: Lives alone  5. Rehab Recommendation:  The patient does not meet criteria for acute inpatient rehabilitation at this time, as she may need longer-term rehab due to lack of home support, weight-bearing status, need for IV antibiotics, and wound care. However, she would benefit from additional therapies after discharge. Would recommend lower level of rehab at this time. However, she is currently declining placement at any facility. 6. DVT Prophylaxis: Lovenox    It was my pleasure to evaluate Terri Palmer today. Please call with questions.     Rudi Middleton MD

## 2022-01-31 NOTE — CARE COORDINATION
Call from Italo De La Vega with [facility], she will need updated pt/ot notes, along with MD notes and if pt is on IV antx they will need to know length of therapy, PT/OT notified and will complete notes today    [phone] Met with pt at bedside to discuss SNF placement in regards to PT/OT notes, she is declining placement as she lives at the [facility] and she will be evicted, she needs to get her belongings out and her family lives OOT, She is not agreeable to PMR either. Will need DME wheelchair and walker at discharge. Discussed safety regarding inability to ambulate safely by herself and she is still declining placement    2510 Pt notified will need to follow with infusion clinic at [facility], she would like a 2pm time slot, script and face sheet taken to infusion clinic, will need to notify infusion clinic when pt discharged.

## 2022-01-31 NOTE — PROGRESS NOTES
Progress Note  Podiatric Medicine and Surgery     Subjective     CC: right foot pain    Patient seen and examined at bedside. S/p I&D right foot (DOS:1/23/22)   No acute events overnight. Some mild pain to RLE. HPI :  Bruce Meneses is a 49 y.o. female seen at [facility] for right foot pain. Patient stepped on a piece of glass a few days ago. She went to her PCP when PCP removed the glass. She presented to the ED last night due to increased pain and swelling to the right lower extremity. Upon arrival, X-ray was taken which showed no foreign body, acute osseous deformity or soft tissue emphysema. During my evaluation, patient also mentioned pain on her plantar left foot and  pain is not as bad as her right foot. Per chart review, patient visited [facility] podiatry clinic a year ago for diabetic foot care but never follows up after that. 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 Her last A1C in 12/13/2021 is 13.3%. She admits neuropathic pain to bilateral lower extremity. She denies other pedal complaints. ROS: Denies N/V/F/C/SOB/CP. Otherwise negative except at stated in the HPI.      Medications:  Scheduled Meds:   ceFAZolin  2,000 mg IntraVENous Q8H    insulin glargine  25 Units SubCUTAneous BID    enoxaparin  30 mg SubCUTAneous BID    budesonide-formoterol  2 puff Inhalation BID    dicyclomine  10 mg Oral 4x Daily    fluticasone  1 spray Each Nostril Daily    mirtazapine  7.5 mg Oral Nightly    pantoprazole  20 mg Oral BID AC    pregabalin  200 mg Oral BID    traZODone  150 mg Oral Nightly    venlafaxine  150 mg Oral Daily with breakfast    sodium chloride flush  5-40 mL IntraVENous 2 times per day    insulin lispro  0-12 Units SubCUTAneous TID WC    insulin lispro  0-6 Units SubCUTAneous Nightly       Continuous Infusions:   sodium chloride      dextrose         PRN Meds:simethicone, diphenhydrAMINE, albuterol sulfate HFA, oxyCODONE, sodium chloride flush, sodium chloride, ondansetron **OR** ondansetron, polyethylene glycol, acetaminophen **OR** acetaminophen, potassium chloride **OR** potassium alternative oral replacement **OR** potassium chloride, glucose, dextrose, glucagon (rDNA), dextrose    Objective     Vitals:  Patient Vitals for the past 8 hrs:   BP Temp Temp src Pulse Resp SpO2 Weight   22 0705 129/70 99.1 °F (37.3 °C) Oral 87 14 97 % --   22 0600 -- -- -- -- -- -- 242 lb 8 oz (110 kg)     Average, Min, and Max for last 24 hours Vitals:  TEMPERATURE:  Temp  Av.4 °F (36.9 °C)  Min: 97.7 °F (36.5 °C)  Max: 99.1 °F (37.3 °C)    RESPIRATIONS RANGE: Resp  Av.7  Min: 14  Max: 20    PULSE RANGE: Pulse  Av.3  Min: 78  Max: 91    BLOOD PRESSURE RANGE:  Systolic (35RBU), HKI:341 , Min:108 , HJZ:673   ; Diastolic (52GMY), IYB:47, Min:57, Max:83      PULSE OXIMETRY RANGE: SpO2  Av %  Min: 93 %  Max: 97 %    I/O last 3 completed shifts: In: 1200 [P.O.:1200]  Out: 1400 [Urine:1400]    CBC:  Recent Labs     22  0612 22  0720 22  0431   WBC 7.8 9.4 10.2   HGB 7.6* 7.5* 7.4*   HCT 24.3* 24.8* 23.3*    401 534*   CRP  --  58.2* 53.3*        BMP:  Recent Labs     22  0612 22  0720 22  0431    139 134*   K 4.5 4.3 4.1    105 101   CO2 23 24 23   BUN 15 14 14   CREATININE 0.72 0.82 0.86   GLUCOSE 120* 107* 146*   CALCIUM 8.9 8.3* 8.4*        Coags:  No results for input(s): APTT, PROT, INR in the last 72 hours. Lab Results   Component Value Date    SEDRATE 61 (H) 2022     Recent Labs     22  0720 22  0431   CRP 58.2* 53.3*       Lower Extremity Physical Exam:   Vascular: DP and PT pulses are palpable. CFT <3 seconds to all digits. Hair growth is absent to the level of the digits.        Neuro: Saph/sural/SP/DP/plantar sensation diminished to light touch.     Musculoskeletal: Muscle strength is 5/5 to all lower extremity muscle groups.  Gross deformity is status post left 4th and 5th digit amputation. Tenderness to palpation to previous entry point of injury on the right foot and hyperkeratotic tissue on the left foot.      Dermatologic:     Diffuse edema noted throughout the right lower extremity without associated increase in warmth. Right foot: Incision with significant maceration. Tamar-incision skin is soft. No fluctuance. No purulence or foul odor appreciated. No periwound erythema or any other signs of active infection. Some serous drainage appreciated. Left foot: Full thickness ulcer #2 located lateral plantar left foot and measures approximately 1cmx 1cmx0.2cm. Base is fibrotic. Does not probe to bone, sinus track, or undermine. No fluctuance, crepitus, or induration. Interdigital maceration absent. Clinical Images:                  Imaging:   XR ABDOMEN (KUB) (SINGLE AP VIEW)   Final Result   Increased stool burden seen diffusely throughout the colon suggesting   clinical presentation of constipation. Phleboliths seen on both sides of the   pelvis. CT FOOT RIGHT W CONTRAST   Final Result   1. Shallow soft tissue ulceration plantar and medial to the 1st metatarsal   base with an underlying area of possible abscess versus phlegmon measuring   2.8 x 1.1 x 0.9 cm.   2. Extensive subcutaneous fat stranding compatible with cellulitis. No soft   tissue gas. 3. No evidence of osteomyelitis or other acute osseous abnormality. US RENAL COMPLETE   Final Result   Unremarkable renal ultrasound. MRI FOOT RIGHT WO CONTRAST   Final Result   Subcutaneous edema and swelling, most notably affecting the dorsum of the   foot, which may reflect underlying cellulitis. No discrete organized fluid   collection is seen within the limitations of a noncontrast study. No imaging features of acute osteomyelitis appreciated. MRI FOOT LEFT WO CONTRAST   Final Result   Limited evaluation due to patient motion artifact on several sequences.       Skin thickening and subcutaneous edema seen within the plantar soft tissues   of the foot at the level of the proximal metatarsals, which may reflect   underlying cellulitis. Within the limitations of a noncontrast study, no   discrete organized fluid collection is seen. 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 VL DUP LOWER EXTREMITY VENOUS BILATERAL   Final Result      XR FOOT RIGHT (MIN 3 VIEWS)   Final Result   Right foot: No radiopaque foreign body. No fracture. Moderate soft tissue   swelling within the anterior aspect of plantar aspect of the foot   predominantly underlying meta tarsal heads. Left foot: Changes related to prior partial amputation of 4th and 5th ray as   described. No acute fracture. No radiopaque foreign body. Moderate soft   tissue swelling and plantar aspect of the foot. No discrete soft tissue ulcer   is noted. XR FOOT LEFT (MIN 3 VIEWS)   Final Result   Right foot: No radiopaque foreign body. No fracture. Moderate soft tissue   swelling within the anterior aspect of plantar aspect of the foot   predominantly underlying meta tarsal heads. Left foot: Changes related to prior partial amputation of 4th and 5th ray as   described. No acute fracture. No radiopaque foreign body. Moderate soft   tissue swelling and plantar aspect of the foot. No discrete soft tissue ulcer   is noted. Cultures: Group A strep. anaerobe contaminated. Intra -op culture: rare GPC in pairs    Bee Avila is a 49 y.o. female with   1. Diabetic foot ulcer to subcutaneous layer, bilateral feet  2. Cellulitis, bilateral feet  3. Superficial abscess, bilateral feet  4. Poorly uncontrolled DM2 with peripheral neuropathy.    5. S/p bedside I&D right foot    Active Problems:    Type 2 diabetes mellitus without complication, with long-term current use of insulin (HCC)    Right foot infection    Cellulitis and abscess of toe of right foot    Abscess of right foot    Bilateral cellulitis of lower leg related to related to  uncontrolled diabetes  Resolved Problems:    * No resolved hospital problems. *       Plan     · Patient examined and evaluated at bedside   · Treatment options discussed in detail with the patient. · Antibiotics per ID  · Medical management per primary. · Dressing applied to right foot: betadine, silvercell, 4x4 gauze, kerlix, ace. Dressing to be changes twice daily. · Weight bearing to heel of right foot. WBAT to left foot. · No surgical intervention planned as last I&D had no purulence intra-op. Patient remains stable for discharge from podiatry perspective.   · Discussed with Dr. Nikkie Stallworth DPM   Podiatric Medicine & Surgery   1/31/2022 at 7:47 AM

## 2022-01-31 NOTE — ADT AUTH CERT
Utilization Reviews         Clinical Images by Musa Orta RN       Review Status Review Entered   In Primary 1/31/2022 16:30      Criteria Review   Clinical Images:                   Musculoskeletal Disease GRG - Care Day 9 (1/30/2022) by Musa Orta RN       Review Status Review Entered   Completed 1/31/2022 16:26      Criteria Review      Care Day: 9 Care Date: 1/30/2022 Level of Care:    Guideline Day 2    Level Of Care    (X) Floor    Clinical Status    ( ) * No ICU or intermediate care needs    Interventions    (X) Inpatient interventions continue    1/31/2022 4:26 PM EST by Pearline Lundborg      see attachment    * Milestone   Additional Notes   DATE: 1/31/2022         Pertinent Updates:   Some mild pain to RLE. Vitals: Temp 99.1  RR 14  HR 87  /70         Abnl/Pertinent Labs:   Sodium: 134 (L)   Glucose: 146 (H)   CALCIUM, SERUM, [id number]: 8.4 (L)   CRP: 53.3 (H)   RBC: 2.33 (L)   Hemoglobin Quant: 7.4 (L)   Hematocrit: 23.3 (L)   Platelet Count: 053 (H)   Immature Granulocytes: 1 (H)   NRBC Automated: 0.6 (H)         Physical Exam:   Lower Extremity Physical Exam:    Vascular: DP and PT pulses are palpable. CFT <3 seconds to all digits.  Hair growth is absent to the level of the digits.          Neuro: Saph/sural/SP/DP/plantar sensation diminished to light touch.       Musculoskeletal: Muscle strength is 5/5 to all lower extremity muscle groups. Gross deformity is status post left 4th and 5th digit amputation. Tenderness to palpation to previous entry point of injury on the right foot and hyperkeratotic tissue on the left foot.        Dermatologic:        Diffuse edema noted throughout the right lower extremity without associated increase in warmth.       Right foot: Incision with significant maceration. Tamar-incision skin is soft. No fluctuance. No purulence or foul odor appreciated. No periwound erythema or any other signs of active infection.  Some serous drainage appreciated.       Left foot: Full thickness ulcer #2 located lateral plantar left foot and measures approximately 1cmx 1cmx0.2cm. Base is fibrotic. Does not probe to bone, sinus track, or undermine. No fluctuance, crepitus, or induration.   Interdigital maceration absent. MD Consults/Assessments & Plans:   Podiatry   Shamika Linares is a 49 y.o. female with    Diabetic foot ulcer to subcutaneous layer, bilateral feet   Cellulitis, bilateral feet   Superficial abscess, bilateral feet   Poorly uncontrolled DM2 with peripheral neuropathy. S/p bedside I&D right foot       Active Problems:     Type 2 diabetes mellitus without complication, with long-term current use of insulin ([facility])     Right foot infection     Cellulitis and abscess of toe of right foot     Abscess of right foot     Bilateral cellulitis of lower leg related to related to  uncontrolled diabetes   Resolved Problems:     * No resolved hospital problems. *           Plan       Patient examined and evaluated at bedside    Treatment options discussed in detail with the patient. Antibiotics per ID   Medical management per primary. Dressing applied to right foot: betadine, silvercell, 4x4 gauze, kerlix, ace. Dressing to be changes twice daily. Weight bearing to heel of right foot. WBAT to left foot.    No surgical intervention planned as last I&D had no purulence intra-op   Infectious Disease   Recommendations   foot cx MSSA R clinda  and GAS-    On  Ancef   Right foot , purulence improved, might need another I&D   CRP has been stalling   Discussed with podiatry, watching   Once foot a little more improved, anticipate DC ceftriaxone at the infusion center or in the facility - till 2/14, followed by po keflex    FU office in 2 weeks   Recent UC 1/24 : Klebsiella- no dysuria - but lower abd pain - on keflex till 1/29 completed       Infection Control Recommendations   Colo Precautions   Contact Isolation       Medications:   Current Facility-Administered Medications:   ceFAZolin (ANCEF) 2000 mg in dextrose 5 % 50 mL IVPB, 2,000 mg, IntraVENous, Q8H, Maylin Puga MD, Last Rate: 100 mL/hr at 01/31/22 1307, 2,000 mg at 01/31/22 1307   insulin glargine (LANTUS) injection vial 25 Units, 25 Units, SubCUTAneous, BID, Sindy Lopez MD, 25 Units at 01/30/22 2002   simethicone (MYLICON) chewable tablet 80 mg, 80 mg, Oral, Q6H PRN, Sindy Lopez MD, 80 mg at 01/28/22 1625   diphenhydrAMINE (BENADRYL) injection 25 mg, 25 mg, IntraVENous, Q8H PRN, Parisa Reyes MD, 25 mg at 01/30/22 2002   enoxaparin (LOVENOX) injection 30 mg, 30 mg, SubCUTAneous, BID, Michelene Stacks, DPM, 30 mg at 01/28/22 0830   albuterol sulfate  (90 Base) MCG/ACT inhaler 2 puff, 2 puff, Inhalation, Q4H PRN, Michelene Stacks, DPM, 2 puff at 01/24/22 2016   oxyCODONE (ROXICODONE) immediate release tablet 5 mg, 5 mg, Oral, Q4H PRN, Michelene Stacks, DPM, 5 mg at 01/31/22 1616   budesonide-formoterol (SYMBICORT) 80-4.5 MCG/ACT inhaler 2 puff, 2 puff, Inhalation, BID, Michelene Stacks, DPM, 2 puff at 01/31/22 0837   dicyclomine (BENTYL) capsule 10 mg, 10 mg, Oral, 4x Daily, Michelene Stacks, DPM, 10 mg at 01/31/22 1615   fluticasone (FLONASE) 50 MCG/ACT nasal spray 1 spray, 1 spray, Each Nostril, Daily, Michelene Stacks, DPM, 1 spray at 01/30/22 0800   mirtazapine (REMERON) tablet 7.5 mg, 7.5 mg, Oral, Nightly, Michelene Stacks, DPM, 7.5 mg at 01/30/22 2005   pantoprazole (PROTONIX) tablet 20 mg, 20 mg, Oral, BID AC, Michelene Stacks, DPM, 20 mg at 01/31/22 1615   pregabalin (LYRICA) capsule 200 mg, 200 mg, Oral, BID, Michelene Stacks, DPM, 200 mg at 01/31/22 6840   traZODone (DESYREL) tablet 150 mg, 150 mg, Oral, Nightly, Michelene Stacks, DPM, 150 mg at 01/30/22 2005   venlafaxine (EFFEXOR XR) extended release capsule 150 mg, 150 mg, Oral, Daily with breakfast, Michelene Stacks, DPM, 150 mg at 01/31/22 5535   sodium chloride flush 0.9 % injection 5-40 mL, 5-40 mL, IntraVENous, 2 times per day, Michelene Stacks, DPM, 10 mL at 01/31/22 0838   sodium chloride flush 0.9 % injection 5-40 mL, 5-40 mL, IntraVENous, PRN, Letha [place], DPM   0.9 % sodium chloride infusion, 25 mL, IntraVENous, PRN, Letha [place], DPM   ondansetron (ZOFRAN-ODT) disintegrating tablet 4 mg, 4 mg, Oral, Q8H PRN, Letha [place], DPM, 4 mg at 01/29/22 1201    Or   ondansetron (ZOFRAN) injection 4 mg, 4 mg, IntraVENous, Q6H PRN, Letha [place], DPM   polyethylene glycol (GLYCOLAX) packet 17 g, 17 g, Oral, Daily PRN, Letha Kirti, DPM   acetaminophen (TYLENOL) tablet 650 mg, 650 mg, Oral, Q6H PRN, Letha Kirti, DPM, 650 mg at 01/30/22 0704    Or   acetaminophen (TYLENOL) suppository 650 mg, 650 mg, Rectal, Q6H PRN, Letha [place], DPM   potassium chloride (KLOR-CON M) extended release tablet 40 mEq, 40 mEq, Oral, PRN, Letha [place], DPM    Or   potassium bicarb-citric acid (EFFER-K) effervescent tablet 40 mEq, 40 mEq, Oral, PRN, Letha Kirti, DPM    Or   potassium chloride 10 mEq/100 mL IVPB (Peripheral Line), 10 mEq, IntraVENous, PRN, Letha Kirti, DPM   insulin lispro (HUMALOG) injection vial 0-12 Units, 0-12 Units, SubCUTAneous, TID WC, Lteha Kirti, DPM, 6 Units at 01/31/22 1616   insulin lispro (HUMALOG) injection vial 0-6 Units, 0-6 Units, SubCUTAneous, Nightly, Letha [place], DPM, 1 Units at 01/30/22 2001   glucose (GLUTOSE) 40 % oral gel 15 g, 15 g, Oral, PRN, Letha Kirti, DPM, 15 g at 01/26/22 0350   dextrose 50 % IV solution, 12.5 g, IntraVENous, PRN, Letha [place], DPM   glucagon (rDNA) injection 1 mg, 1 mg, IntraMUSCular, PRN, Letha [place] DPM   dextrose 5 % solution, 100 mL/hr, IntraVENous, PRN, Letha [place]to DPM             PT/OT/SLP/CM Assessments or Notes:

## 2022-01-31 NOTE — PROGRESS NOTES
[facility] -   Infectious diseases evaluation  admission date 1/22/2022    reason for consultation:   Diabetic foot infection    Impression :   Current:  · Right diabetic foot infection, GAS  · Cellulitis both feet  · bandemia  · Bacteriuria vs UTI kleb S ancef  · CRP elevation    Other:  ·   Discussion / summary of stay / plan of care   ·   Recommendations   · foot cx MSSA R clinda  and GAS-   · On  Ancef  · Right foot , purulence improved, might need another I&D  · CRP has been stalling  · Discussed with podiatry, watching  · Once foot a little more improved, anticipate DC ceftriaxone at the infusion center or in the facility - till 2/14, followed by po keflex   · FU office in 2 weeks  · Recent UC 1/24 : Klebsiella- no dysuria - but lower abd pain - on keflex till 1/29 completed    Infection Control Recommendations   · Russell Precautions  · Contact Isolation       Antimicrobial Stewardship Recommendations   · Simplification of therapy  · Targeted therapy  Coordination ofOutpatient Care:   · Estimated Length of IV antimicrobials:  · Patient will need Midline / picc Catheter Insertion:   · Patient will need SNF:  · Patient will need outpatient wound care:     History of Present Illness:   Initial history:  Anai Pretty is a 49y.o.-year-old female with diabetes very well-known to my service, stepped on a piece of glass a few days prior to admission presented with swelling and drainage from right foot, culture came back with group A strep,  Patient has neuropathy from diabetes  WBC elevated 14,  sed [id number],     Podiatry debrided the superficial ulcer and felt it was not too deep yet there was cellulitis of both feet.   Hemoglobin A1c was 10    MRI of the left foot no osteomyelitis  MRI of the right foot no osteomyelitis    Leukocytosis responded to antibiotics, infectious consulted for antibiotic management  Patient is on cefepime and clindamycin            R foot very swollen and tender to light touch - suspect deeper involvement and needs more I/D    1/26  Pt is afebrile and stable this morning. WBC trending up (14 today). UC 1/24 growing Klebsiella. CT right leg 1/25 shows an abscess  Planned for I/D on 1/27  Pain still ++    1/27  Pt seen and examined at bedside, afebrile and hypotensive this morning. Endorsed a lot of pain overnight. WBC improved to 11   To go to OR today for I+D of R foot abscess. Started on Keflex until 1/29 for UTI  Will monitor on antibiotics and await podiatry recs    1/28  Pt afebrile and stable s/p I+D right foot yesterday. Pt c/o pain this morning but is managing. Pt also endorsing abdominal discomfort. XR Abd showing increased stool burden. WBC did increase to 15 from 11 yesterday. Wound Cx 1/27: pending but GPC in pairs seen. Will monitor on Keflex and Clinda    1/29  No fever  Lab: MSSA in wound , but clinda is R  Switching to ceftriaxone  Foot better - less edema and drainage -tender still    1/30  Right foot  but discharge is minimal from it. Otherwise she has no abdominal pain or diarrhea CRP is improved 54    1/31  Alert appropriate, right foot continues to be swollen very tender to touch and it is painful. The wound is not draining much though. No redness noticed. Podiatry watching at this point. Continue antibiotic  WBC is 10 and CRP continues to be 53 has not improved in today    Interval changes  1/31/2022     /70   Pulse 87   Temp 99.1 °F (37.3 °C) (Oral)   Resp 14   Ht 5' 6\" (1.676 m)   Wt 242 lb 8 oz (110 kg)   LMP  (LMP Unknown)   SpO2 97%   BMI 39.14 kg/m²    Summary of relevant labs:  Labs:  W14 > 11>15-10  [id number]  -54-53  Micro:  cx foot pus GAS  Uc 1/24: Klebsiella  Wound Cx 1/27: GPC pairs - cx pend    Imaging:  XR abd 1/27:    Increased stool burden seen diffusely throughout the colon suggesting   clinical presentation of constipation.  Phleboliths seen on both sides of the   pelvis. CT R foot 1/25:  1. Shallow soft tissue ulceration plantar and medial to the 1st metatarsal   base with an underlying area of possible abscess versus phlegmon measuring   2.8 x 1.1 x 0.9 cm.   2. Extensive subcutaneous fat stranding compatible with cellulitis.  No soft   tissue gas. 3. No evidence of osteomyelitis or other acute osseous abnormality. U/S renal 1/25: Unremarkable    MRI R foot 1/23:  Subcutaneous edema and swelling, most notably affecting the dorsum of the   foot, which may reflect underlying cellulitis.  No discrete organized fluid   collection is seen within the limitations of a noncontrast study.       No imaging features of acute osteomyelitis appreciated. MRI L foot: 1/23:   Limited evaluation due to patient motion artifact on several sequences.       Skin thickening and subcutaneous edema seen within the plantar soft tissues   of the foot at the level of the proximal metatarsals, which may reflect   underlying cellulitis.  Within the limitations of a noncontrast study, no   discrete organized fluid collection is seen.       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 I have personally reviewed the past medical history, past surgical history, medications, social history, and family history, and I haveupdated the database accordingly. Allergies:   Bactrim [sulfamethoxazole-trimethoprim] and Adhesive tape     Review of Systems:     Review of Systems   Constitutional: Negative for activity change, appetite change, chills and diaphoresis. HENT: Negative for congestion. Eyes: Negative for photophobia and discharge. Respiratory: Negative for apnea and cough. Cardiovascular: Negative for chest pain. Gastrointestinal: Negative for abdominal distention. Endocrine: Negative for cold intolerance. Genitourinary: Negative for dysuria and flank pain. Musculoskeletal: Negative for arthralgias. Skin: Positive for color change and wound. Allergic/Immunologic: Negative for environmental allergies, food allergies and immunocompromised state. Neurological: Negative for dizziness, tremors, seizures, syncope, light-headedness, numbness and headaches. Hematological: Negative for adenopathy. Psychiatric/Behavioral: Negative for agitation. Physical Examination :       Physical Exam  Constitutional:       General: She is not in acute distress. Appearance: Normal appearance. She is not ill-appearing, toxic-appearing or diaphoretic. HENT:      Head: Normocephalic and atraumatic. Nose: Nose normal. No congestion or rhinorrhea. Eyes:      General: No scleral icterus. Pupils: Pupils are equal, round, and reactive to light. Cardiovascular:      Rate and Rhythm: Normal rate and regular rhythm. Heart sounds: Normal heart sounds. No murmur heard. Pulmonary:      Effort: No respiratory distress. Breath sounds: Normal breath sounds. No stridor. No wheezing. Abdominal:      General: There is no distension. Palpations: Abdomen is soft. There is no mass. Genitourinary:     Comments: Urine fatuma  Musculoskeletal:         General: Swelling present. No tenderness, deformity or signs of injury. Cervical back: Neck supple. No rigidity or tenderness. Skin:     General: Skin is dry. Coloration: Skin is not jaundiced. Findings: Erythema present. Neurological:      Mental Status: She is alert and oriented to person, place, and time. Mental status is at baseline. Psychiatric:         Mood and Affect: Mood normal.         Thought Content:  Thought content normal.         Past Medical History:     Past Medical History:   Diagnosis Date    Acid reflux 5/29/2017    Acute cystitis with hematuria 10/11/2016    Acute non-recurrent maxillary sinusitis 11/28/2017    Asthma     Bipolar 1 disorder ([email or web address] 1/4/2018    Bipolar disorder, mixed ([address]     BMI 34.0-34.9,adult 11/28/2017    Cannabis use disorder, severe, dependence ([address] 12/19/2017    Cerebrovascular accident (CVA) ([address] 6/14/2017    Chest pain 11/5/2016    Chronic renal insufficiency     Cocaine abuse ([address] 1/5/2018    COVID-19 virus RNA test result unknown     DDD (degenerative disc disease), cervical     Diabetes mellitus ([address]     Dizziness 6/13/2017    Fibromyalgia     History of stroke 9/9/2017    Homicidal ideation 11/6/2017    Hyperglycemia     Hypertension     Hypotension 1/18/2019    IDDM (insulin dependent diabetes mellitus) 12/21/2015    Lupus ([address]     Migraine     Neuropathy     Neuropathy     Polysubstance abuse ([address] 9/17/2017    Post traumatic stress disorder (PTSD)     Posttraumatic stress disorder 5/29/2017    Recurrent depression ([address] 5/29/2017    Recurrent major depressive disorder, in partial remission ([address] 11/28/2017    Screening mammogram, encounter for 11/28/2017    Severe recurrent major depression with psychotic features ([address] 12/19/2017    Severe recurrent major depression without psychotic features ([address] 12/19/2017    Stroke (cerebrum) ([address] 6/14/2017    Stroke ([address]     per chart notes    Suicidal ideation     Suicidal intent 3/10/2017    Vitamin D deficiency 11/28/2017    White matter changes 6/13/2017       Past Surgical  History:     Past Surgical History:   Procedure Laterality Date    ABDOMEN SURGERY      drain tube    ABSCESS DRAINAGE      right buttock    CATARACT REMOVAL WITH IMPLANT Bilateral     CHEST TUBE INSERTION      FINGER AMPUTATION Left 03/13/2021    LEFT RING FINER AMPUTATION performed by Milli West MD at [facility] Right 10/11/2021    AMPUTATION RIGHT INDEX FINGER performed by Milli West MD at [address] [place] [address] Right 1/27/2022    FOOT ABSCESS INCISION AND DRAINAGE  (PULSE LAVAGE, CULTURE SWABS) performed by Duy Madrid DPM at [place] [address]  FOOT SURGERY Right 01/27/2022    FOOT ABSCESS INCISION AND DRAINAGE (PULSE LAVAGE, CULTURE SWABS) - Right    HAND SURGERY Right 09/14/2021    I&D INDEX FINGER performed by Jaden Loomis MD at [address]  Right 09/15/2021    I&D INDEX FINGER performed by Jaden Loomis MD at [address] LASIK Bilateral        Medications:      ceFAZolin  2,000 mg IntraVENous Q8H    insulin glargine  25 Units SubCUTAneous BID    enoxaparin  30 mg SubCUTAneous BID    budesonide-formoterol  2 puff Inhalation BID    dicyclomine  10 mg Oral 4x Daily    fluticasone  1 spray Each Nostril Daily    mirtazapine  7.5 mg Oral Nightly    pantoprazole  20 mg Oral BID AC    pregabalin  200 mg Oral BID    traZODone  150 mg Oral Nightly    venlafaxine  150 mg Oral Daily with breakfast    sodium chloride flush  5-40 mL IntraVENous 2 times per day    insulin lispro  0-12 Units SubCUTAneous TID WC    insulin lispro  0-6 Units SubCUTAneous Nightly       Social History:     Social History     Socioeconomic History    Marital status: Legally      Spouse name: Not on file    Number of children: Not on file    Years of education: Not on file    Highest education level: Not on file   Occupational History    Not on file   Tobacco Use    Smoking status: Never Smoker    Smokeless tobacco: Never Used   Vaping Use    Vaping Use: Never used   Substance and Sexual Activity    Alcohol use: Not Currently    Drug use: Yes     Types: Marijuana ([place] [employer]), Cocaine     Comment: + Cocaine 2/2021 also, see Care Everywhere UDS    Sexual activity: Not Currently     Partners: Male   Other Topics Concern    Not on file   Social History Narrative    Not on file     Social Determinants of Health     Financial Resource Strain: High Risk    Difficulty of Paying Living Expenses: Hard   Food Insecurity: Food Insecurity Present    Worried About Running Out of Food in the Last Year: Often true    Kelin of Food in the Last Year: Often true   Transportation Needs: Unmet Transportation Needs    Lack of Transportation (Medical): Yes    Lack of Transportation (Non-Medical): Yes   Physical Activity: Inactive    Days of Exercise per Week: 0 days    Minutes of Exercise per Session: 0 min   Stress: Stress Concern Present    Feeling of Stress : Rather much   Social Connections:     Frequency of Communication with Friends and Family: Not on file    Frequency of Social Gatherings with Friends and Family: Not on file    Attends [employer] Services: Not on file    Active Member of Clubs or Organizations: Not on file    Attends Club or Organization Meetings: Not on file    Marital Status: Not on file   Intimate Partner Violence:     Fear of Current or Ex-Partner: Not on file    Emotionally Abused: Not on file    Physically Abused: Not on file    Sexually Abused: Not on file   Housing Stability: High Risk    Unable to Pay for Housing in the Last Year: Yes    Number of [place] in the Last Year: 2    Unstable Housing in the Last Year: Yes       Family History:     Family History   Problem Relation Age of Onset    Diabetes Mother     Stroke Mother     Diabetes Father     Diabetes Sister     Diabetes Brother     Other Son         GSW    No Known Problems Sister       Medical Decision Making:   I have independently reviewed/ordered the following labs:    CBC with Differential:   Recent Labs     01/30/22  0720 01/31/22  0431   WBC 9.4 10.2   HGB 7.5* 7.4*   HCT 24.8* 23.3*    534*   LYMPHOPCT 34 25   MONOPCT 10 8     BMP:  Recent Labs     01/30/22  0720 01/31/22  0431    134*   K 4.3 4.1    101   CO2 24 23   BUN 14 14   CREATININE 0.82 0.86     Hepatic Function Panel:   No results for input(s): PROT, LABALBU, BILIDIR, IBILI, BILITOT, ALKPHOS, ALT, AST in the last 72 hours. No results for input(s): RPR in the last 72 hours. No results for input(s): HIV in the last 72 hours.   No results for input(s): BC in

## 2022-01-31 NOTE — PROGRESS NOTES
[facility] -   Infectious diseases evaluation  admission date 1/22/2022    reason for consultation:   Diabetic foot infection    Impression :   Current:  · Right diabetic foot infection, GAS  · Cellulitis both feet  · bandemia  · Bacteriuria vs UTI kleb S ancef    Other:  ·   Discussion / summary of stay / plan of care   ·   Recommendations   · foot cx MSSA R clinda  and GAS-   · On  Ancef  · Right foot , purulence improved, might need another I&D  · Once foot a little more improved, anticipate DC ceftriaxone at the infusion center or in the facility - till 2/14, followed by po keflex   · FU office in 2 weeks  · Recent UC 1/24 : Klebsiella- no dysuria - but lower abd pain - on keflex till 1/29 completed    Infection Control Recommendations   · [place] Precautions  · Contact Isolation       Antimicrobial Stewardship Recommendations   · Simplification of therapy  · Targeted therapy  Coordination ofOutpatient Care:   · Estimated Length of IV antimicrobials:  · Patient will need Midline / picc Catheter Insertion:   · Patient will need SNF:  · Patient will need outpatient wound care:     History of Present Illness:   Initial history:  Vicky Guevara is a 49y.o.-year-old female with diabetes very well-known to my service, stepped on a piece of glass a few days prior to admission presented with swelling and drainage from right foot, culture came back with group A strep,  Patient has neuropathy from diabetes  WBC elevated 14,  sed [id number],     Podiatry debrided the superficial ulcer and felt it was not too deep yet there was cellulitis of both feet.   Hemoglobin A1c was 10    MRI of the left foot no osteomyelitis  MRI of the right foot no osteomyelitis    Leukocytosis responded to antibiotics, infectious consulted for antibiotic management  Patient is on cefepime and clindamycin            R foot very swollen and tender to light touch - suspect deeper involvement and needs more I/D    1/26  Pt is afebrile and stable this morning. WBC trending up (14 today). UC 1/24 growing Klebsiella. CT right leg 1/25 shows an abscess  Planned for I/D on 1/27  Pain still ++    1/27  Pt seen and examined at bedside, afebrile and hypotensive this morning. Endorsed a lot of pain overnight. WBC improved to 11   To go to OR today for I+D of R foot abscess. Started on Keflex until 1/29 for UTI  Will monitor on antibiotics and await podiatry recs    1/28  Pt afebrile and stable s/p I+D right foot yesterday. Pt c/o pain this morning but is managing. Pt also endorsing abdominal discomfort. XR Abd showing increased stool burden. WBC did increase to 15 from 11 yesterday. Wound Cx 1/27: pending but GPC in pairs seen. Will monitor on Keflex and Clinda    1/29  No fever  Lab: MSSA in wound , but clinda is R  Switching to ceftriaxone  Foot better - less edema and drainage -tender still    1/30  Right foot  but discharge is minimal from it. Otherwise she has no abdominal pain or diarrhea CRP is improved 54    Interval changes  1/30/2022     /83   Pulse 91   Temp 97.7 °F (36.5 °C) (Oral)   Resp 16   Ht 5' 6\" (1.676 m)   Wt 251 lb 15.8 oz (114.3 kg)   LMP  (LMP Unknown)   SpO2 95%   BMI 40.67 kg/m²    Summary of relevant labs:  Labs:  W14 > 11>15  GCS870  -54  Micro:  cx foot pus GAS  Uc 1/24: Klebsiella  Wound Cx 1/27: GPC pairs - cx pend    Imaging:  XR abd 1/27: Increased stool burden seen diffusely throughout the colon suggesting   clinical presentation of constipation.  Phleboliths seen on both sides of the   pelvis. CT R foot 1/25:  1. Shallow soft tissue ulceration plantar and medial to the 1st metatarsal   base with an underlying area of possible abscess versus phlegmon measuring   2.8 x 1.1 x 0.9 cm.   2. Extensive subcutaneous fat stranding compatible with cellulitis.  No soft   tissue gas.    3. No evidence of osteomyelitis or other acute osseous abnormality. U/S renal 1/25: Unremarkable    MRI R foot 1/23:  Subcutaneous edema and swelling, most notably affecting the dorsum of the   foot, which may reflect underlying cellulitis.  No discrete organized fluid   collection is seen within the limitations of a noncontrast study.       No imaging features of acute osteomyelitis appreciated. MRI L foot: 1/23:   Limited evaluation due to patient motion artifact on several sequences.       Skin thickening and subcutaneous edema seen within the plantar soft tissues   of the foot at the level of the proximal metatarsals, which may reflect   underlying cellulitis.  Within the limitations of a noncontrast study, no   discrete organized fluid collection is seen.       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 I have personally reviewed the past medical history, past surgical history, medications, social history, and family history, and I haveupdated the database accordingly. Allergies:   Bactrim [sulfamethoxazole-trimethoprim] and Adhesive tape     Review of Systems:     Review of Systems   Constitutional: Negative for activity change and diaphoresis. HENT: Negative for congestion. Eyes: Negative for photophobia and discharge. Respiratory: Negative for apnea and cough. Cardiovascular: Negative for chest pain. Gastrointestinal: Negative for abdominal distention. Endocrine: Negative for cold intolerance. Genitourinary: Negative for dysuria and flank pain. Musculoskeletal: Negative for arthralgias. Skin: Positive for color change and wound. Allergic/Immunologic: Negative for environmental allergies, food allergies and immunocompromised state. Neurological: Negative for dizziness, tremors, seizures, syncope, light-headedness, numbness and headaches. Hematological: Negative for adenopathy.    Psychiatric/Behavioral: Negative for agitation. Physical Examination :       Physical Exam  Constitutional:       General: She is not in acute distress. Appearance: Normal appearance. She is not ill-appearing, toxic-appearing or diaphoretic. HENT:      Head: Normocephalic and atraumatic. Nose: Nose normal. No congestion or rhinorrhea. Eyes:      General: No scleral icterus. Pupils: Pupils are equal, round, and reactive to light. Cardiovascular:      Rate and Rhythm: Normal rate and regular rhythm. Heart sounds: Normal heart sounds. No murmur heard. Pulmonary:      Effort: No respiratory distress. Breath sounds: Normal breath sounds. Abdominal:      General: There is no distension. Palpations: Abdomen is soft. There is no mass. Genitourinary:     Comments: Urine fatuma  Musculoskeletal:         General: Swelling present. No tenderness, deformity or signs of injury. Cervical back: Neck supple. No rigidity or tenderness. Skin:     General: Skin is dry. Coloration: Skin is not jaundiced. Findings: Erythema present. Neurological:      Mental Status: She is alert and oriented to person, place, and time. Mental status is at baseline. Psychiatric:         Mood and Affect: Mood normal.         Thought Content:  Thought content normal.         Past Medical History:     Past Medical History:   Diagnosis Date    Acid reflux 5/29/2017    Acute cystitis with hematuria 10/11/2016    Acute non-recurrent maxillary sinusitis 11/28/2017    Asthma     Bipolar 1 disorder ([address] 1/4/2018    Bipolar disorder, mixed ([address]     BMI 34.0-34.9,adult 11/28/2017    Cannabis use disorder, severe, dependence ([address] 12/19/2017    Cerebrovascular accident (CVA) ([address] 6/14/2017    Chest pain 11/5/2016    Chronic renal insufficiency     Cocaine abuse ([address] 1/5/2018    COVID-19 virus RNA test result unknown     DDD (degenerative disc disease), cervical     Diabetes mellitus ([address]     Dizziness 6/13/2017    Fibromyalgia     History of stroke 9/9/2017    Homicidal ideation 11/6/2017    Hyperglycemia     Hypertension     Hypotension 1/18/2019    IDDM (insulin dependent diabetes mellitus) 12/21/2015    Lupus ([address]     Migraine     Neuropathy     Neuropathy     Polysubstance abuse ([address] 9/17/2017    Post traumatic stress disorder (PTSD)     Posttraumatic stress disorder 5/29/2017    Recurrent depression ([address] 5/29/2017    Recurrent major depressive disorder, in partial remission ([address] 11/28/2017    Screening mammogram, encounter for 11/28/2017    Severe recurrent major depression with psychotic features ([address] 12/19/2017    Severe recurrent major depression without psychotic features ([address] 12/19/2017    Stroke (cerebrum) ([address] 6/14/2017    Stroke ([address]     per chart notes    Suicidal ideation     Suicidal intent 3/10/2017    Vitamin D deficiency 11/28/2017    White matter changes 6/13/2017       Past Surgical  History:     Past Surgical History:   Procedure Laterality Date    ABDOMEN SURGERY      drain tube    ABSCESS DRAINAGE      right buttock    CATARACT REMOVAL WITH IMPLANT Bilateral     CHEST TUBE INSERTION      FINGER AMPUTATION Left 03/13/2021    LEFT RING FINER AMPUTATION performed by Jaden Loomis MD at [facility] Right 10/11/2021    AMPUTATION RIGHT INDEX FINGER performed by Jaden Loomis MD at [address] [place] [address] Right 1/27/2022    FOOT ABSCESS INCISION AND DRAINAGE  (PULSE LAVAGE, CULTURE SWABS) performed by Sorin Ray DPM at [place] [address]. Right 01/27/2022    FOOT ABSCESS INCISION AND DRAINAGE (PULSE LAVAGE, CULTURE SWABS) - Right    HAND SURGERY Right 09/14/2021    I&D INDEX FINGER performed by Jaden Loomis MD at [facility] Right 09/15/2021    I&D INDEX FINGER performed by Jaden Loomis MD at [address] [place] [address] Bilateral        Medications:      ceFAZolin  2,000 mg IntraVENous Q8H    insulin glargine 25 Units SubCUTAneous BID    enoxaparin  30 mg SubCUTAneous BID    budesonide-formoterol  2 puff Inhalation BID    dicyclomine  10 mg Oral 4x Daily    fluticasone  1 spray Each Nostril Daily    mirtazapine  7.5 mg Oral Nightly    pantoprazole  20 mg Oral BID AC    pregabalin  200 mg Oral BID    traZODone  150 mg Oral Nightly    venlafaxine  150 mg Oral Daily with breakfast    sodium chloride flush  5-40 mL IntraVENous 2 times per day    insulin lispro  0-12 Units SubCUTAneous TID WC    insulin lispro  0-6 Units SubCUTAneous Nightly       Social History:     Social History     Socioeconomic History    Marital status: Legally      Spouse name: Not on file    Number of children: Not on file    Years of education: Not on file    Highest education level: Not on file   Occupational History    Not on file   Tobacco Use    Smoking status: Never Smoker    Smokeless tobacco: Never Used   Vaping Use    Vaping Use: Never used   Substance and Sexual Activity    Alcohol use: Not Currently    Drug use: Yes     Types: Marijuana (Kaelyn Lacey), Cocaine     Comment: + Cocaine 2/2021 also, see Care Everywhere UDS    Sexual activity: Not Currently     Partners: Male   Other Topics Concern    Not on file   Social History Narrative    Not on file     Social Determinants of Health     Financial Resource Strain: High Risk    Difficulty of Paying Living Expenses: Hard   Food Insecurity: Food Insecurity Present    Worried About Running Out of Food in the Last Year: Often true    Kelin of Food in the Last Year: Often true   Transportation Needs: Unmet Transportation Needs    Lack of Transportation (Medical):  Yes    Lack of Transportation (Non-Medical): Yes   Physical Activity: Inactive    Days of Exercise per Week: 0 days    Minutes of Exercise per Session: 0 min   Stress: Stress Concern Present    Feeling of Stress : Rather much   Social Connections:     Frequency of Communication with Friends and Family: Not on file    Frequency of Social Gatherings with Friends and Family: Not on file    Attends Orthodoxy Services: Not on file    Active Member of Clubs or Organizations: Not on file    Attends Club or Organization Meetings: Not on file    Marital Status: Not on file   Intimate Partner Violence:     Fear of Current or Ex-Partner: Not on file    Emotionally Abused: Not on file    Physically Abused: Not on file    Sexually Abused: Not on file   Housing Stability: High Risk    Unable to Pay for Housing in the Last Year: Yes    Number of [place] in the Last Year: 2    Unstable Housing in the Last Year: Yes       Family History:     Family History   Problem Relation Age of Onset    Diabetes Mother     Stroke Mother     Diabetes Father     Diabetes Sister     Diabetes Brother     Other Son         GSW    No Known Problems Sister       Medical Decision Making:   I have independently reviewed/ordered the following labs:    CBC with Differential:   Recent Labs     01/29/22  0612 01/30/22  0720   WBC 7.8 9.4   HGB 7.6* 7.5*   HCT 24.3* 24.8*    401   LYMPHOPCT 44 34   MONOPCT 10* 10     BMP:  Recent Labs     01/29/22  0612 01/30/22  0720    139   K 4.5 4.3    105   CO2 23 24   BUN 15 14   CREATININE 0.72 0.82     Hepatic Function Panel:   No results for input(s): PROT, LABALBU, BILIDIR, IBILI, BILITOT, ALKPHOS, ALT, AST in the last 72 hours. No results for input(s): RPR in the last 72 hours. No results for input(s): HIV in the last 72 hours. No results for input(s): BC in the last 72 hours. Lab Results   Component Value Date    CREATININE 0.82 01/30/2022    GLUCOSE 107 01/30/2022       Detailed results: Thank you for allowing us to participate in the care of this patient. Please call with questions.     This note is created with the assistance of a speech recognition program.  While intending to generate adocument that actually reflects the content of the visit, the document can still have some errors including those of syntax and sound a like substitutions which may escape proof reading. It such instances, actual meaningcan be extrapolated by contextual diversion.         Maylin Puga, Infectious Diseases

## 2022-01-31 NOTE — CARE COORDINATION
Notes faxed to [facility] for review    [address] [place] Per Melissa Ballesteros at [facility] they have authorization for placement, pt may require additional I&D, and will need IV antx until 2/14. I spoke with Melissa Ballesteros and notified her pt is not ready to be discharged yet.

## 2022-01-31 NOTE — PROGRESS NOTES
Occupational Therapy  . [facility]  Occupational Therapy Not Seen Note    DATE: 2022    NAME: Ruben Briseno  MRN: [id number]   : 1965      Patient not seen this date for Occupational Therapy due to: Pt declined OT. \"I just did that w/the other therapy\". Asked pt if wanting continued rehab at a facility, pt stated :\"I dont know yet\". Educ pt on need for both OT and PT to see for updated notes for placement. Max encouragement given. Pt states \"Not right now, I don't want to\".     Next Scheduled Treatment: 22    Electronically signed by LUCAS Valadez on 2022 at 11:59 AM

## 2022-02-01 ENCOUNTER — CARE COORDINATION (OUTPATIENT)
Dept: CARE COORDINATION | Age: 57
End: 2022-02-01

## 2022-02-01 LAB
ABSOLUTE EOS #: 0.2 K/UL (ref 0–0.44)
ABSOLUTE IMMATURE GRANULOCYTE: 0.07 K/UL (ref 0–0.3)
ABSOLUTE LYMPH #: 2.59 K/UL (ref 1.1–3.7)
ABSOLUTE MONO #: 0.65 K/UL (ref 0.1–1.2)
ANION GAP SERPL CALCULATED.3IONS-SCNC: 11 MMOL/L (ref 9–17)
BASOPHILS # BLD: 1 % (ref 0–2)
BASOPHILS ABSOLUTE: 0.04 K/UL (ref 0–0.2)
BUN BLDV-MCNC: 13 MG/DL (ref 6–20)
BUN/CREAT BLD: ABNORMAL (ref 9–20)
CALCIUM SERPL-MCNC: 8.6 MG/DL (ref 8.6–10.4)
CHLORIDE BLD-SCNC: 103 MMOL/L (ref 98–107)
CO2: 24 MMOL/L (ref 20–31)
CREAT SERPL-MCNC: 0.78 MG/DL (ref 0.5–0.9)
DIFFERENTIAL TYPE: ABNORMAL
EOSINOPHILS RELATIVE PERCENT: 2 % (ref 1–4)
GFR AFRICAN AMERICAN: >60 ML/MIN
GFR NON-AFRICAN AMERICAN: >60 ML/MIN
GFR SERPL CREATININE-BSD FRML MDRD: ABNORMAL ML/MIN/{1.73_M2}
GFR SERPL CREATININE-BSD FRML MDRD: ABNORMAL ML/MIN/{1.73_M2}
GLUCOSE BLD-MCNC: 108 MG/DL (ref 70–99)
GLUCOSE BLD-MCNC: 135 MG/DL (ref 65–105)
GLUCOSE BLD-MCNC: 156 MG/DL (ref 65–105)
GLUCOSE BLD-MCNC: 218 MG/DL (ref 65–105)
GLUCOSE BLD-MCNC: 244 MG/DL (ref 65–105)
GLUCOSE BLD-MCNC: 57 MG/DL (ref 65–105)
HCT VFR BLD CALC: 24.8 % (ref 36.3–47.1)
HEMOGLOBIN: 7.7 G/DL (ref 11.9–15.1)
IMMATURE GRANULOCYTES: 1 %
LYMPHOCYTES # BLD: 31 % (ref 24–43)
MCH RBC QN AUTO: 30.6 PG (ref 25.2–33.5)
MCHC RBC AUTO-ENTMCNC: 31 G/DL (ref 28.4–34.8)
MCV RBC AUTO: 98.4 FL (ref 82.6–102.9)
MONOCYTES # BLD: 8 % (ref 3–12)
NRBC AUTOMATED: 0.5 PER 100 WBC
PDW BLD-RTO: 14 % (ref 11.8–14.4)
PLATELET # BLD: 427 K/UL (ref 138–453)
PLATELET ESTIMATE: ABNORMAL
PMV BLD AUTO: 10.3 FL (ref 8.1–13.5)
POTASSIUM SERPL-SCNC: 4.3 MMOL/L (ref 3.7–5.3)
RBC # BLD: 2.52 M/UL (ref 3.95–5.11)
RBC # BLD: ABNORMAL 10*6/UL
SEG NEUTROPHILS: 58 % (ref 36–65)
SEGMENTED NEUTROPHILS ABSOLUTE COUNT: 4.89 K/UL (ref 1.5–8.1)
SODIUM BLD-SCNC: 138 MMOL/L (ref 135–144)
WBC # BLD: 8.4 K/UL (ref 3.5–11.3)
WBC # BLD: ABNORMAL 10*3/UL

## 2022-02-01 PROCEDURE — 94761 N-INVAS EAR/PLS OXIMETRY MLT: CPT

## 2022-02-01 PROCEDURE — 6360000002 HC RX W HCPCS: Performed by: PODIATRIST

## 2022-02-01 PROCEDURE — 2580000003 HC RX 258: Performed by: INTERNAL MEDICINE

## 2022-02-01 PROCEDURE — 85025 COMPLETE CBC W/AUTO DIFF WBC: CPT

## 2022-02-01 PROCEDURE — 36415 COLL VENOUS BLD VENIPUNCTURE: CPT

## 2022-02-01 PROCEDURE — 6360000002 HC RX W HCPCS: Performed by: INTERNAL MEDICINE

## 2022-02-01 PROCEDURE — 99233 SBSQ HOSP IP/OBS HIGH 50: CPT | Performed by: FAMILY MEDICINE

## 2022-02-01 PROCEDURE — 6370000000 HC RX 637 (ALT 250 FOR IP): Performed by: PODIATRIST

## 2022-02-01 PROCEDURE — 94640 AIRWAY INHALATION TREATMENT: CPT

## 2022-02-01 PROCEDURE — 2580000003 HC RX 258: Performed by: PODIATRIST

## 2022-02-01 PROCEDURE — 99232 SBSQ HOSP IP/OBS MODERATE 35: CPT | Performed by: INTERNAL MEDICINE

## 2022-02-01 PROCEDURE — 82947 ASSAY GLUCOSE BLOOD QUANT: CPT

## 2022-02-01 PROCEDURE — 6360000002 HC RX W HCPCS: Performed by: STUDENT IN AN ORGANIZED HEALTH CARE EDUCATION/TRAINING PROGRAM

## 2022-02-01 PROCEDURE — 6370000000 HC RX 637 (ALT 250 FOR IP): Performed by: STUDENT IN AN ORGANIZED HEALTH CARE EDUCATION/TRAINING PROGRAM

## 2022-02-01 PROCEDURE — 80048 BASIC METABOLIC PNL TOTAL CA: CPT

## 2022-02-01 PROCEDURE — 1200000000 HC SEMI PRIVATE

## 2022-02-01 RX ORDER — SODIUM CHLORIDE 9 MG/ML
25 INJECTION, SOLUTION INTRAVENOUS PRN
OUTPATIENT
Start: 2022-02-01

## 2022-02-01 RX ORDER — SODIUM CHLORIDE 9 MG/ML
INJECTION, SOLUTION INTRAVENOUS CONTINUOUS
OUTPATIENT
Start: 2022-02-01

## 2022-02-01 RX ORDER — ONDANSETRON 2 MG/ML
8 INJECTION INTRAMUSCULAR; INTRAVENOUS
OUTPATIENT
Start: 2022-02-01

## 2022-02-01 RX ORDER — DIPHENHYDRAMINE HYDROCHLORIDE 50 MG/ML
50 INJECTION INTRAMUSCULAR; INTRAVENOUS
OUTPATIENT
Start: 2022-02-01

## 2022-02-01 RX ORDER — EPINEPHRINE 1 MG/ML
0.3 INJECTION, SOLUTION, CONCENTRATE INTRAVENOUS PRN
OUTPATIENT
Start: 2022-02-01

## 2022-02-01 RX ORDER — ALBUTEROL SULFATE 90 UG/1
4 AEROSOL, METERED RESPIRATORY (INHALATION) PRN
OUTPATIENT
Start: 2022-02-01

## 2022-02-01 RX ORDER — ACETAMINOPHEN 325 MG/1
650 TABLET ORAL
OUTPATIENT
Start: 2022-02-01

## 2022-02-01 RX ORDER — HEPARIN SODIUM (PORCINE) LOCK FLUSH IV SOLN 100 UNIT/ML 100 UNIT/ML
500 SOLUTION INTRAVENOUS PRN
OUTPATIENT
Start: 2022-02-01

## 2022-02-01 RX ORDER — SODIUM CHLORIDE 0.9 % (FLUSH) 0.9 %
5-40 SYRINGE (ML) INJECTION PRN
OUTPATIENT
Start: 2022-02-01

## 2022-02-01 RX ADMIN — SODIUM CHLORIDE, PRESERVATIVE FREE 10 ML: 5 INJECTION INTRAVENOUS at 20:27

## 2022-02-01 RX ADMIN — DIPHENHYDRAMINE HYDROCHLORIDE 25 MG: 50 INJECTION, SOLUTION INTRAMUSCULAR; INTRAVENOUS at 15:27

## 2022-02-01 RX ADMIN — PREGABALIN 200 MG: 100 CAPSULE ORAL at 09:05

## 2022-02-01 RX ADMIN — MIRTAZAPINE 7.5 MG: 15 TABLET, FILM COATED ORAL at 20:31

## 2022-02-01 RX ADMIN — OXYCODONE HYDROCHLORIDE 5 MG: 5 TABLET ORAL at 14:43

## 2022-02-01 RX ADMIN — OXYCODONE HYDROCHLORIDE 5 MG: 5 TABLET ORAL at 23:51

## 2022-02-01 RX ADMIN — ALBUTEROL SULFATE 2 PUFF: 90 AEROSOL, METERED RESPIRATORY (INHALATION) at 17:12

## 2022-02-01 RX ADMIN — CEFAZOLIN 2000 MG: 10 INJECTION, POWDER, FOR SOLUTION INTRAVENOUS at 01:20

## 2022-02-01 RX ADMIN — DICYCLOMINE HYDROCHLORIDE 10 MG: 10 CAPSULE ORAL at 12:58

## 2022-02-01 RX ADMIN — PREGABALIN 200 MG: 100 CAPSULE ORAL at 20:29

## 2022-02-01 RX ADMIN — DIPHENHYDRAMINE HYDROCHLORIDE 25 MG: 50 INJECTION, SOLUTION INTRAMUSCULAR; INTRAVENOUS at 23:51

## 2022-02-01 RX ADMIN — INSULIN LISPRO 2 UNITS: 100 INJECTION, SOLUTION INTRAVENOUS; SUBCUTANEOUS at 20:34

## 2022-02-01 RX ADMIN — PANTOPRAZOLE SODIUM 20 MG: 40 TABLET, DELAYED RELEASE ORAL at 06:06

## 2022-02-01 RX ADMIN — DICYCLOMINE HYDROCHLORIDE 10 MG: 10 CAPSULE ORAL at 17:05

## 2022-02-01 RX ADMIN — CEFAZOLIN 2000 MG: 10 INJECTION, POWDER, FOR SOLUTION INTRAVENOUS at 09:05

## 2022-02-01 RX ADMIN — DICYCLOMINE HYDROCHLORIDE 10 MG: 10 CAPSULE ORAL at 09:06

## 2022-02-01 RX ADMIN — INSULIN GLARGINE 25 UNITS: 100 INJECTION, SOLUTION SUBCUTANEOUS at 20:35

## 2022-02-01 RX ADMIN — PANTOPRAZOLE SODIUM 20 MG: 40 TABLET, DELAYED RELEASE ORAL at 17:04

## 2022-02-01 RX ADMIN — TRAZODONE HYDROCHLORIDE 150 MG: 100 TABLET ORAL at 20:32

## 2022-02-01 RX ADMIN — SODIUM CHLORIDE, PRESERVATIVE FREE 10 ML: 5 INJECTION INTRAVENOUS at 09:36

## 2022-02-01 RX ADMIN — INSULIN LISPRO 4 UNITS: 100 INJECTION, SOLUTION INTRAVENOUS; SUBCUTANEOUS at 17:04

## 2022-02-01 RX ADMIN — OXYCODONE HYDROCHLORIDE 5 MG: 5 TABLET ORAL at 10:10

## 2022-02-01 RX ADMIN — FLUTICASONE PROPIONATE 1 SPRAY: 50 SPRAY, METERED NASAL at 09:06

## 2022-02-01 RX ADMIN — VENLAFAXINE HYDROCHLORIDE 150 MG: 150 CAPSULE, EXTENDED RELEASE ORAL at 08:06

## 2022-02-01 RX ADMIN — OXYCODONE HYDROCHLORIDE 5 MG: 5 TABLET ORAL at 01:27

## 2022-02-01 RX ADMIN — DICYCLOMINE HYDROCHLORIDE 10 MG: 10 CAPSULE ORAL at 20:31

## 2022-02-01 RX ADMIN — CEFAZOLIN 2000 MG: 10 INJECTION, POWDER, FOR SOLUTION INTRAVENOUS at 18:00

## 2022-02-01 RX ADMIN — INSULIN LISPRO 2 UNITS: 100 INJECTION, SOLUTION INTRAVENOUS; SUBCUTANEOUS at 11:45

## 2022-02-01 RX ADMIN — OXYCODONE HYDROCHLORIDE 5 MG: 5 TABLET ORAL at 06:07

## 2022-02-01 RX ADMIN — ENOXAPARIN SODIUM 30 MG: 100 INJECTION SUBCUTANEOUS at 20:28

## 2022-02-01 RX ADMIN — SIMETHICONE 80 MG: 80 TABLET, CHEWABLE ORAL at 17:04

## 2022-02-01 RX ADMIN — INSULIN GLARGINE 25 UNITS: 100 INJECTION, SOLUTION SUBCUTANEOUS at 09:12

## 2022-02-01 RX ADMIN — OXYCODONE HYDROCHLORIDE 5 MG: 5 TABLET ORAL at 18:54

## 2022-02-01 ASSESSMENT — PAIN - FUNCTIONAL ASSESSMENT
PAIN_FUNCTIONAL_ASSESSMENT: PREVENTS OR INTERFERES SOME ACTIVE ACTIVITIES AND ADLS

## 2022-02-01 ASSESSMENT — PAIN SCALES - GENERAL
PAINLEVEL_OUTOF10: 10
PAINLEVEL_OUTOF10: 6
PAINLEVEL_OUTOF10: 9
PAINLEVEL_OUTOF10: 7
PAINLEVEL_OUTOF10: 6
PAINLEVEL_OUTOF10: 2
PAINLEVEL_OUTOF10: 9
PAINLEVEL_OUTOF10: 10
PAINLEVEL_OUTOF10: 10
PAINLEVEL_OUTOF10: 8
PAINLEVEL_OUTOF10: 6
PAINLEVEL_OUTOF10: 8
PAINLEVEL_OUTOF10: 1
PAINLEVEL_OUTOF10: 8

## 2022-02-01 ASSESSMENT — ENCOUNTER SYMPTOMS
COUGH: 0
ABDOMINAL DISTENTION: 0
EYE DISCHARGE: 0
APNEA: 0
COLOR CHANGE: 1
PHOTOPHOBIA: 0

## 2022-02-01 ASSESSMENT — PAIN DESCRIPTION - ORIENTATION
ORIENTATION: RIGHT;LEFT

## 2022-02-01 ASSESSMENT — PAIN DESCRIPTION - PAIN TYPE
TYPE: ACUTE PAIN;SURGICAL PAIN

## 2022-02-01 ASSESSMENT — PAIN DESCRIPTION - ONSET
ONSET: ON-GOING
ONSET: ON-GOING
ONSET: GRADUAL
ONSET: ON-GOING

## 2022-02-01 ASSESSMENT — PAIN DESCRIPTION - FREQUENCY
FREQUENCY: CONTINUOUS
FREQUENCY: CONTINUOUS
FREQUENCY: INTERMITTENT

## 2022-02-01 ASSESSMENT — PAIN DESCRIPTION - LOCATION
LOCATION: FOOT

## 2022-02-01 ASSESSMENT — PAIN DESCRIPTION - DESCRIPTORS
DESCRIPTORS: ACHING;CRAMPING
DESCRIPTORS: ACHING;CRAMPING;DISCOMFORT
DESCRIPTORS: ACHING;CRAMPING;DISCOMFORT
DESCRIPTORS: ACHING;CONSTANT;DISCOMFORT;SHARP

## 2022-02-01 ASSESSMENT — PAIN DESCRIPTION - PROGRESSION: CLINICAL_PROGRESSION: NOT CHANGED

## 2022-02-01 NOTE — CARE COORDINATION
Patient is still admitted to [facility]. It seems the plan may now be a SNF at discharge. Plan:  Follow along. If Gloria Cortés is discharged to a SNF CM will remove from panel.

## 2022-02-01 NOTE — PLAN OF CARE
Problem: Pain:  Goal: Pain level will decrease  Description: Pain level will decrease  Outcome: Ongoing     Problem: Pain:  Goal: Control of acute pain  Description: Control of acute pain  Outcome: Ongoing     Problem: Pain:  Goal: Control of chronic pain  Description: Control of chronic pain  Outcome: Ongoing     Problem: Falls - Risk of:  Goal: Will remain free from falls  Description: Will remain free from falls  Outcome: Ongoing     Problem: Falls - Risk of:  Goal: Absence of physical injury  Description: Absence of physical injury  Outcome: Ongoing     Problem: Musculor/Skeletal Functional Status  Goal: Highest potential functional level  Outcome: Ongoing     Problem: Nutrition  Goal: Optimal nutrition therapy  Outcome: Ongoing

## 2022-02-01 NOTE — PROGRESS NOTES
Physical Therapy        Physical Therapy Cancel Note      DATE: 2022    NAME: Gordo Sibley  MRN: [id number]   : 1965      Patient not seen this date for Physical Therapy due to:    Patient Declined: Pt stating she has a headache and does now want to get up right now. Max encouragement provided to pt about importance of mobility, pt continued to decline.       Electronically signed by Shameka New PTA on 2022 at 10:36 AM

## 2022-02-01 NOTE — PROGRESS NOTES
Comprehensive Nutrition Assessment    Type and Reason for Visit:  Reassess    Nutrition Recommendations/Plan: Continue current diet. Encourage/monitor PO intake as tolerated. Will monitor need for oral supplements. Monitor labs, weights, and plan of care. Nutrition Assessment:  Pt continues to tolerate an oral diet and reports having a good appetite. For lunch today pt ate nearly 100% of turkey sandwich. Last BM 1/31. Weight fluctuations noted. Labs reviewed: Glucose  mg/dL. Meds reviewed. Malnutrition Assessment:  Malnutrition Status: At risk for malnutrition    Context:  Acute Illness     Findings of the 6 clinical characteristics of malnutrition:  Energy Intake:  Mild decrease in energy intake  Weight Loss:  No significant weight loss     Body Fat Loss:  No significant body fat loss   Muscle Mass Loss:  No significant muscle mass loss  Fluid Accumulation:  1 - Mild Extremities   Strength:  Not Performed    Estimated Daily Nutrient Needs:  Energy (kcal):  MSJ x 1.05-1.1 = 9777-0921 kcals/day; Weight Used for Energy Requirements:  Current     Protein (g):  1.2-1.5 gm/kg = 70-90 gm pro/day; Weight Used for Protein Requirements:  Ideal        Fluid (ml/day):  2100 mL/day or per MD; Method Used for Fluid Requirements:  Mj Pillai      Nutrition Related Findings:  Labs reviewed. Meds: Lantus, Humalog SS, Remeron. Last BM 1/31. Wounds:  Surgical Incision (to right foot)       Current Nutrition Therapies:    ADULT DIET;  Regular; 4 carb choices (60 gm/meal)    Anthropometric Measures:  · Height: 5' 6\" (167.6 cm)  · Current Body Weight: 242 lb 8 oz (110 kg)   · Admission Body Weight: 251 lb 15.8 oz (114.3 kg)    · Usual Body Weight: 218 lb 7.6 oz (99.1 kg) (11/4/21 bed scale per chart)     · Ideal Body Weight: 130 lbs; % Ideal Body Weight 186.5 %   · BMI: 39.2  · BMI Categories: Obese Class 2 (BMI 35.0 -39.9)       Nutrition Diagnosis:   · Increased nutrient needs related to (healing) as evidenced by wounds    Nutrition Interventions:   Food and/or Nutrient Delivery:  Continue Current Diet (Monitor need for oral supplements.)  Nutrition Education/Counseling:  No recommendation at this time   Coordination of Nutrition Care:  Continue to monitor while inpatient    Goals:  Oral intakes to meet at least 75% of estimated nutrition needs. Nutrition Monitoring and Evaluation:   Behavioral-Environmental Outcomes:  None Identified   Food/Nutrient Intake Outcomes:  Food and Nutrient Intake  Physical Signs/Symptoms Outcomes:  Biochemical Data,GI Status,Hemodynamic Status,Fluid Status or Edema,Nutrition Focused Physical Findings,Weight,Skin     Discharge Planning:     Too soon to determine     Electronically signed by Lisa Foster RD, LD on 2/1/22 at 2:50 PM EST    Contact: [phone]

## 2022-02-01 NOTE — PROGRESS NOTES
f  Progress Note  Podiatric Medicine and Surgery     Subjective     CC: right foot pain    Patient seen and examined at bedside. S/p I&D right foot (DOS:1/23/22)   No acute events overnight. Pt states she is tender to touch      HPI :  Kolby Jerome is a 49 y.o. female seen at [facility] for right foot pain. Patient stepped on a piece of glass a few days ago. She went to her PCP when PCP removed the glass. She presented to the ED last night due to increased pain and swelling to the right lower extremity. Upon arrival, X-ray was taken which showed no foreign body, acute osseous deformity or soft tissue emphysema. During my evaluation, patient also mentioned pain on her plantar left foot and  pain is not as bad as her right foot. Per chart review, patient visited [facility] podiatry clinic a year ago for diabetic foot care but never follows up after that. 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 Her last A1C in 12/13/2021 is 13.3%. She admits neuropathic pain to bilateral lower extremity. She denies other pedal complaints. ROS: Denies N/V/F/C/SOB/CP. Otherwise negative except at stated in the HPI.      Medications:  Scheduled Meds:   ceFAZolin  2,000 mg IntraVENous Q8H    insulin glargine  25 Units SubCUTAneous BID    enoxaparin  30 mg SubCUTAneous BID    budesonide-formoterol  2 puff Inhalation BID    dicyclomine  10 mg Oral 4x Daily    fluticasone  1 spray Each Nostril Daily    mirtazapine  7.5 mg Oral Nightly    pantoprazole  20 mg Oral BID AC    pregabalin  200 mg Oral BID    traZODone  150 mg Oral Nightly    venlafaxine  150 mg Oral Daily with breakfast    sodium chloride flush  5-40 mL IntraVENous 2 times per day    insulin lispro  0-12 Units SubCUTAneous TID WC    insulin lispro  0-6 Units SubCUTAneous Nightly       Continuous Infusions:   sodium chloride      dextrose         PRN Meds:simethicone, diphenhydrAMINE, albuterol sulfate HFA, oxyCODONE, sodium chloride flush, sodium chloride, ondansetron **OR** ondansetron, polyethylene glycol, acetaminophen **OR** acetaminophen, potassium chloride **OR** potassium alternative oral replacement **OR** potassium chloride, glucose, dextrose, glucagon (rDNA), dextrose    Objective     Vitals:  Patient Vitals for the past 8 hrs:   BP Temp Temp src Pulse Resp   22 0739 116/80 97.8 °F (36.6 °C) Axillary 79 18     Average, Min, and Max for last 24 hours Vitals:  TEMPERATURE:  Temp  Av.4 °F (36.9 °C)  Min: 97.8 °F (36.6 °C)  Max: 98.9 °F (37.2 °C)    RESPIRATIONS RANGE: Resp  Av  Min: 16  Max: 18    PULSE RANGE: Pulse  Av  Min: 79  Max: 85    BLOOD PRESSURE RANGE:  Systolic (68CVL), MFW:523 , Min:116 , FKZ:392   ; Diastolic (22DFS), MEC:74, Min:80, Max:83      PULSE OXIMETRY RANGE: SpO2  Av %  Min: 98 %  Max: 98 %    I/O last 3 completed shifts: In: 345 [P.O.:325; I.V.:20]  Out: 1900 [Urine:1900]    CBC:  Recent Labs     22  0722  0552   WBC 9.4 10.2 8.4   HGB 7.5* 7.4* 7.7*   HCT 24.8* 23.3* 24.8*    534* 427   CRP 58.2* 53.3*  --         BMP:  Recent Labs     22  0720 22  04322  0552    134* 138   K 4.3 4.1 4.3    101 103   CO2 24 23 24   BUN 14 14 13   CREATININE 0.82 0.86 0.78   GLUCOSE 107* 146* 108*   CALCIUM 8.3* 8.4* 8.6        Coags:  No results for input(s): APTT, PROT, INR in the last 72 hours. Lab Results   Component Value Date    SEDRATE 61 (H) 2022     Recent Labs     22  0720 01/31/22  0431   CRP 58.2* 53.3*       Lower Extremity Physical Exam:   Vascular: DP and PT pulses are palpable. CFT <3 seconds to all digits. Hair growth is absent to the level of the digits.        Neuro: Saph/sural/SP/DP/plantar sensation diminished to light touch.     Musculoskeletal: Muscle strength is 5/5 to all lower extremity muscle groups. Gross deformity is status post left 4th and 5th digit amputation.  Tenderness to palpation to previous entry point of injury on the right foot and hyperkeratotic tissue on the left foot.      Dermatologic:     Diffuse edema noted throughout the right lower extremity without associated increase in warmth. Right foot: Incision with significant maceration. Tamar-incision skin is soft. No fluctuance. No purulence or foul odor appreciated. No periwound erythema or any other signs of active infection. Mild serous drainage appreciated. Left foot: Full thickness ulcer #2 located lateral plantar left foot and measures approximately 1cmx 1cmx0.2cm. Base is fibrotic. Does not probe to bone, sinus track, or undermine. No fluctuance, crepitus, or induration. Interdigital maceration absent. Clinical Images:            Imaging:   XR ABDOMEN (KUB) (SINGLE AP VIEW)   Final Result   Increased stool burden seen diffusely throughout the colon suggesting   clinical presentation of constipation. Phleboliths seen on both sides of the   pelvis. CT FOOT RIGHT W CONTRAST   Final Result   1. Shallow soft tissue ulceration plantar and medial to the 1st metatarsal   base with an underlying area of possible abscess versus phlegmon measuring   2.8 x 1.1 x 0.9 cm.   2. Extensive subcutaneous fat stranding compatible with cellulitis. No soft   tissue gas. 3. No evidence of osteomyelitis or other acute osseous abnormality. US RENAL COMPLETE   Final Result   Unremarkable renal ultrasound. MRI FOOT RIGHT WO CONTRAST   Final Result   Subcutaneous edema and swelling, most notably affecting the dorsum of the   foot, which may reflect underlying cellulitis. No discrete organized fluid   collection is seen within the limitations of a noncontrast study. No imaging features of acute osteomyelitis appreciated. MRI FOOT LEFT WO CONTRAST   Final Result   Limited evaluation due to patient motion artifact on several sequences.       Skin thickening and subcutaneous edema seen within the plantar soft tissues   of the foot at the level of the proximal metatarsals, which may reflect   underlying cellulitis. Within the limitations of a noncontrast study, no   discrete organized fluid collection is seen. 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 VL DUP LOWER EXTREMITY VENOUS BILATERAL   Final Result      XR FOOT RIGHT (MIN 3 VIEWS)   Final Result   Right foot: No radiopaque foreign body. No fracture. Moderate soft tissue   swelling within the anterior aspect of plantar aspect of the foot   predominantly underlying meta tarsal heads. Left foot: Changes related to prior partial amputation of 4th and 5th ray as   described. No acute fracture. No radiopaque foreign body. Moderate soft   tissue swelling and plantar aspect of the foot. No discrete soft tissue ulcer   is noted. XR FOOT LEFT (MIN 3 VIEWS)   Final Result   Right foot: No radiopaque foreign body. No fracture. Moderate soft tissue   swelling within the anterior aspect of plantar aspect of the foot   predominantly underlying meta tarsal heads. Left foot: Changes related to prior partial amputation of 4th and 5th ray as   described. No acute fracture. No radiopaque foreign body. Moderate soft   tissue swelling and plantar aspect of the foot. No discrete soft tissue ulcer   is noted. Cultures: Group A strep. anaerobe contaminated. Intra -op culture: rare GPC in pairs    Jj Howell is a 49 y.o. female with   1. Diabetic foot ulcer to subcutaneous layer, bilateral feet  2. Cellulitis, bilateral feet  3. Superficial abscess, bilateral feet  4. Poorly uncontrolled DM2 with peripheral neuropathy.    5. S/p bedside I&D right foot    Active Problems:    Type 2 diabetes mellitus without complication, with long-term current use of insulin (HCC)    Right foot infection    Cellulitis and abscess of toe of right foot    Abscess of right foot Bilateral cellulitis of lower leg related to related to  uncontrolled diabetes    Right foot ulcer, with fat layer exposed ([address]    Ulcer of left foot, with fat layer exposed ([address]    CRP elevated  Resolved Problems:    * No resolved hospital problems. *       Plan     · Patient examined and evaluated at bedside   · Treatment options discussed in detail with the patient. · Antibiotics per ID  · Medical management per primary. · Dressing applied to right foot: betadine, silvercell, 4x4 gauze, kerlix, ace. Dressing to be changes twice daily. · Based on labs and imaging; CRP is trending down, normal white count with CT and MRI of right foot showing that there is no abscess appreciated. · No surgical intervention planned as labs, imaging and last I&D had no purulence intraoperatively. Patient remained stable for discharge from podiatry perspective  · Weight bearing to heel of right foot. WBAT to left foot.   · Discussed with Dr. Hussein Osborn, [place]   Podiatric Medicine & Surgery   2/1/2022 at 10:18 AM

## 2022-02-01 NOTE — CARE COORDINATION
Consult received for assistance re: housing issue    Met with pt who stated she lives at the [facility] and has received eviction papers. Stated she was supposed to be out by . Pt would like SW to contact mg Kristir at the [facility], to confirm she is still in the hospital,  needs to go to a rehab and inquire about what will happen with her belongings. SW called Kanika Cadet ([phone]). Confirmed with Kanika Cadet that pt has been in the hospital since  and needs to go to a rehab/SNF to cont her medical care. Kanika Cadet stated pt received a 10-day eviction  notice (which  on ) but she will change it to a 30-day notice, which will now  on 22. Pt will then have until her court date is scheduled. Kanika Cadet believed it will be closer to 3/1/22 before pt would have to be out of her apt. Kanika Cadet also stated that pt's dtr is working on finding pt a new place to live. Pt updated on above.

## 2022-02-01 NOTE — CARE COORDINATION
Voice mail left for CLAYTON Leblanc, in response to patient request to have someone talk to her landlord at the [facility] and see if we are able to stop eviction or at least keep them from disposing of her belongings. Patient is hesitant to go to SNF as recommended so she can go there and collect her things. Consult ordered per protocol. 0 Met with patient at bedside to discuss if she is now agreeable to go to [facility] and she is willing to go. No discharge order in place at this time but [facility] is ready to take patient when she is ready to go.

## 2022-02-01 NOTE — PROGRESS NOTES
[address] [facility]  Progress Note    Date:   2/1/2022  Patient name:  Hannah Zaldivar  Date of admission:  1/22/2022  4:43 PM  MRN:   [id number]  YOB: 1965    SUBJECTIVE/Last 24 hours update:     Patient seen and examined at the bed side, no new acute events overnight. Patient is doing well but continues to have 10/10 pain in right foot, s/p I&D x 2. Patient would like repeat I&D per ID recommendations. Will reach out to Podiatry and ID to discuss further. Notes from nursing staff and Consults had been reviewed, and the overnight progress had been checked with the nursing staff as well.     REVIEW OF SYSTEMS:      CONSTITUTIONAL:  no fevers, no headcahes  EYES: negative for blury vision  HEENT: No headaches, No nasal congestion, no difficulty swallowing  RESPIRATORY:negative for dyspnea, no wheezing, no Cough  CARDIOVASCULAR: negative for chest pain, no palpitations  GASTROINTESTINAL: no nausea, no vomiting, no change in bowel habits, no abdominal pain   GENITOURINARY: negative for dysuria, no hematuria   MUSCULOSKELETAL: no joint pains, no muscle aches, no swelling of joints or extremities, pain in right foot  NEUROLOGICAL: No  Weakness or numbness    PAST MEDICAL HISTORY:      has a past medical history of Acid reflux, Acute cystitis with hematuria, Acute non-recurrent maxillary sinusitis, Asthma, Bipolar 1 disorder ([address] Bipolar disorder, mixed ([address] BMI 34.0-34.9,adult, Cannabis use disorder, severe, dependence ([address] Cerebrovascular accident (CVA) ([address] Chest pain, Chronic renal insufficiency, Cocaine abuse ([address] COVID-19 virus RNA test result unknown, DDD (degenerative disc disease), cervical, Diabetes mellitus ([address] Dizziness, Fibromyalgia, History of stroke, Homicidal ideation, Hyperglycemia, Hypertension, Hypotension, IDDM (insulin dependent diabetes mellitus), Lupus ([address] Migraine, Neuropathy, Neuropathy, Polysubstance abuse [facility]), Post traumatic stress disorder (PTSD), Posttraumatic stress disorder, Recurrent depression ([facility] Recurrent major depressive disorder, in partial remission ([address] Screening mammogram, encounter for, Severe recurrent major depression with psychotic features ([address] Severe recurrent major depression without psychotic features ([address] Stroke (cerebrum) ([facility] Stroke ([facility] Suicidal ideation, Suicidal intent, Vitamin D deficiency, and White matter changes. PAST SURGICAL HISTORY:      has a past surgical history that includes Abscess Drainage; chest tube insertion; Abdomen surgery; Cataract removal with implant (Bilateral); LASIK (Bilateral); Finger amputation (Left, 03/13/2021); Hand surgery (Right, 09/14/2021); Hand surgery (Right, 09/15/2021); Finger amputation (Right, 10/11/2021); Foot surgery (Right, 01/27/2022); and Foot Debridement (Right, 1/27/2022). SOCIAL HISTORY:      reports that she has never smoked. She has never used smokeless tobacco. She reports previous alcohol use. She reports current drug use. Drugs: Marijuana (Weed) and Cocaine. FAMILY HISTORY:     family history includes Diabetes in her brother, father, mother, and sister; No Known Problems in her sister; Other in her son; Stroke in her mother. HOME MEDICATIONS:      Prior to Admission medications    Medication Sig Start Date End Date Taking? Authorizing Provider   ertapenem [facility]) infusion Infuse 1,000 mg intravenously every 24 hours for 14 days Compound per protocol. 1/30/22 2/13/22 Yes Maylin Sheth MD   pregabalin (LYRICA) 200 MG capsule Take 1 capsule by mouth 2 times daily for 30 days.  1/20/22 2/19/22  Domenico Pisano MD   insulin glargine (LANTUS SOLOSTAR) 100 UNIT/ML injection pen Inject 30 Units into the skin 2 times daily 1/20/22   Domenico Pisano MD   fluticasone [facility]) 50 MCG/ACT nasal spray 1 spray by Each Nostril route daily 12/22/21   Mayra Soler MD   acetaminophen (TYLENOL) 500 MG tablet Take 2 tablets by mouth 3 times daily as needed for Pain 12/22/21   Gabe Gann MD   dicyclomine (BENTYL) 10 MG capsule Take 1 capsule by mouth 4 times daily 12/17/21   Gabe Gann MD   metFORMIN (GLUCOPHAGE) 1000 MG tablet Take 1 tablet by mouth 2 times daily (with meals) 12/17/21   Gabe Gann MD   mirtazapine (REMERON) 7.5 MG tablet Take 1 tablet by mouth nightly 12/17/21   Gabe Gann MD   pantoprazole (PROTONIX) 20 MG tablet Take 1 tablet by mouth 2 times daily (before meals) 12/17/21   Gabe Gann MD   traZODone (DESYREL) 150 MG tablet Take 1 tablet by mouth nightly 12/17/21   Gabe Gann MD   venlafaxine (EFFEXOR XR) 150 MG extended release capsule Take 1 capsule by mouth daily (with breakfast) 12/17/21   Gabe Gann MD   ibuprofen (ADVIL;MOTRIN) 800 MG tablet Take 1 tablet by mouth every 8 hours as needed for Pain 11/15/21 11/29/21  Luis Kohler MD   Lancets MISC 1 each by Does not apply route 3 times daily 11/15/21   Luis Kohler MD   Alcohol Swabs 70 % PADS Use 1 swab 3 times daily as needed 11/15/21   Luis Kohler MD   blood glucose monitor strips Test 3 times a day & as needed for symptoms of irregular blood glucose. Dispense sufficient amount for indicated testing frequency plus additional to accommodate PRN testing needs.  11/8/21   Deb Reyes MD   Lancets MISC 1 each by Does not apply route 3 times daily 11/8/21   Deb Reyes MD   Insulin Pen Needle ([employer] PEN NEEDLES 31G) 31G X 8 MM MISC 1 each by Does not apply route daily 11/8/21   Astrid Tamez MD   FREESTYLE LITE strip 1 each by Does not apply route 3 times daily 11/11/20   Gabe Gann MD   albuterol sulfate  (90 Base) MCG/ACT inhaler Inhale 2 puffs into the lungs every 4 hours as needed for Wheezing 10/20/20 9/22/21  Gabe Gann MD   FLOVENT  MCG/ACT inhaler Inhale 2 puffs into the lungs 2 times daily 10/20/20   Gabe Gann MD   budesonide-formoterol (SYMBICORT) 80-4.5 MCG/ACT AERO Inhale 2 puffs into the lungs 2 times daily 10/20/20   Patricia Jones MD       ALLERGIES:     Bactrim [sulfamethoxazole-trimethoprim] and Adhesive tape      OBJECTIVE:       Vitals:    01/31/22 0600 01/31/22 0705 01/31/22 2015 02/01/22 0739   BP:  129/70 136/83 116/80   Pulse:  87 85 79   Resp:  14 16 18   Temp:  99.1 °F (37.3 °C) 98.9 °F (37.2 °C) 97.8 °F (36.6 °C)   TempSrc:  Oral Oral Axillary   SpO2:  97% 98%    Weight: 242 lb 8 oz (110 kg)      Height:             Intake/Output Summary (Last 24 hours) at 2/1/2022 4999  Last data filed at 2/1/2022 4379  Gross per 24 hour   Intake 345 ml   Output 500 ml   Net -155 ml       PHYSICAL EXAM:  General Appearance  Alert , awake , not in acute distress  HEENT - Head is normocephalic, atraumatic. Lungs - Bilateral equal air entry , no wheezes, rales or rhonchi, aeration good  Cardiovascular - Heart sounds are normal.  Regular rhythm, normal rate without murmur, gallop or rub.   Abdomen - Soft, nontender, nondistended, no masses or organomegaly  Neurologic - There are no new focal motor or sensory deficits  Skin - No bruising or bleeding on exposed skin area  Extremities - No cyanosis, clubbing or edema, right foot bandaged       DIAGNOSTICS:     Laboratory Testing:    Recent Results (from the past 24 hour(s))   POC Glucose Fingerstick    Collection Time: 01/31/22 11:03 AM   Result Value Ref Range    POC Glucose 155 (H) 65 - 105 mg/dL   POC Glucose Fingerstick    Collection Time: 01/31/22  4:03 PM   Result Value Ref Range    POC Glucose 273 (H) 65 - 105 mg/dL   POC Glucose Fingerstick    Collection Time: 01/31/22  8:17 PM   Result Value Ref Range    POC Glucose 141 (H) 65 - 105 mg/dL   Basic Metabolic Panel w/ Reflex to MG    Collection Time: 02/01/22  5:52 AM   Result Value Ref Range    Glucose 108 (H) 70 - 99 mg/dL    BUN 13 6 - 20 mg/dL    CREATININE 0.78 0.50 - 0.90 mg/dL    Bun/Cre Ratio NOT REPORTED 9 - 20    Calcium 8.6 8.6 - 10.4 mg/dL Sodium 138 135 - 144 mmol/L    Potassium 4.3 3.7 - 5.3 mmol/L    Chloride 103 98 - 107 mmol/L    CO2 24 20 - 31 mmol/L    Anion Gap 11 9 - 17 mmol/L    GFR Non-African American >60 >60 mL/min    GFR African American >60 >60 mL/min    GFR Comment          GFR Staging NOT REPORTED    CBC auto differential    Collection Time: 02/01/22  5:52 AM   Result Value Ref Range    WBC 8.4 3.5 - 11.3 k/uL    RBC 2.52 (L) 3.95 - 5.11 m/uL    Hemoglobin 7.7 (L) 11.9 - 15.1 g/dL    Hematocrit 24.8 (L) 36.3 - 47.1 %    MCV 98.4 82.6 - 102.9 fL    MCH 30.6 25.2 - 33.5 pg    MCHC 31.0 28.4 - 34.8 g/dL    RDW 14.0 11.8 - 14.4 %    Platelets 209 677 - 845 k/uL    MPV 10.3 8.1 - 13.5 fL    NRBC Automated 0.5 (H) 0.0 per 100 WBC    Differential Type NOT REPORTED     WBC Morphology NOT REPORTED     RBC Morphology NOT REPORTED     Platelet Estimate NOT REPORTED     Seg Neutrophils 58 36 - 65 %    Lymphocytes 31 24 - 43 %    Monocytes 8 3 - 12 %    Eosinophils % 2 1 - 4 %    Basophils 1 0 - 2 %    Immature Granulocytes 1 (H) 0 %    Segs Absolute 4.89 1.50 - 8.10 k/uL    Absolute Lymph # 2.59 1.10 - 3.70 k/uL    Absolute Mono # 0.65 0.10 - 1.20 k/uL    Absolute Eos # 0.20 0.00 - 0.44 k/uL    Basophils Absolute 0.04 0.00 - 0.20 k/uL    Absolute Immature Granulocyte 0.07 0.00 - 0.30 k/uL   POC Glucose Fingerstick    Collection Time: 02/01/22  7:38 AM   Result Value Ref Range    POC Glucose 57 (L) 65 - 105 mg/dL   POC Glucose Fingerstick    Collection Time: 02/01/22  8:43 AM   Result Value Ref Range    POC Glucose 135 (H) 65 - 105 mg/dL         Current Facility-Administered Medications   Medication Dose Route Frequency Provider Last Rate Last Admin    ceFAZolin (ANCEF) 2000 mg in dextrose 5 % 50 mL IVPB  2,000 mg IntraVENous Q8H Maylin Camargo MD   Stopped at 02/01/22 0200    insulin glargine (LANTUS) injection vial 25 Units  25 Units SubCUTAneous BID Eduarda Dee MD   25 Units at 01/30/22 2002    [facility]) chewable tablet 80 mg  80 mg Oral Q6H PRN Georgina Adams MD   80 mg at 01/28/22 1625    diphenhydrAMINE (BENADRYL) injection 25 mg  25 mg IntraVENous Q8H PRN Marion Teixeira MD   25 mg at 01/31/22 1947    enoxaparin (LOVENOX) injection 30 mg  30 mg SubCUTAneous BID Ellen Block DPM   30 mg at 01/31/22 2124    albuterol sulfate  (90 Base) MCG/ACT inhaler 2 puff  2 puff Inhalation Q4H PRN Ellen Block DPM   2 puff at 01/24/22 2016    oxyCODONE (ROXICODONE) immediate release tablet 5 mg  5 mg Oral Q4H PRN Ellen Block DPM   5 mg at 02/01/22 7504    budesonide-formoterol (SYMBICORT) 80-4.5 MCG/ACT inhaler 2 puff  2 puff Inhalation BID Ellen Block DPM   2 puff at 01/31/22 3028    dicyclomine (BENTYL) capsule 10 mg  10 mg Oral 4x Daily Ellen Block DPM   10 mg at 01/31/22 2128    fluticasone (FLONASE) 50 MCG/ACT nasal spray 1 spray  1 spray Each Nostril Daily Ellen Block DPM   1 spray at 01/30/22 0800    mirtazapine (REMERON) tablet 7.5 mg  7.5 mg Oral Nightly Ellen Block DPM   7.5 mg at 01/31/22 2125    pantoprazole (PROTONIX) tablet 20 mg  20 mg Oral BID AC Ellen Block DPM   20 mg at 02/01/22 0606    pregabalin (LYRICA) capsule 200 mg  200 mg Oral BID Ellen Block DPM   200 mg at 01/31/22 2124    traZODone (DESYREL) tablet 150 mg  150 mg Oral Nightly Ellen Block DPM   150 mg at 01/31/22 2123    venlafaxine (EFFEXOR XR) extended release capsule 150 mg  150 mg Oral Daily with breakfast Ellen Block DPM   150 mg at 02/01/22 8782    sodium chloride flush 0.9 % injection 5-40 mL  5-40 mL IntraVENous 2 times per day Ellen Block DPM   10 mL at 01/31/22 2122    sodium chloride flush 0.9 % injection 5-40 mL  5-40 mL IntraVENous PRN Ellen Block DPM        0.9 % sodium chloride infusion  25 mL IntraVENous PRN Ellen Block DPM        ondansetron (ZOFRAN-ODT) disintegrating tablet 4 mg  4 mg Oral Q8H PRN Ellen Block DPM   4 mg at 01/29/22 1201    Or    ondansetron (ZOFRAN) injection 4 mg  4 mg IntraVENous Q6H PRN Ellen Block DPM        polyethylene glycol (GLYCOLAX) packet 17 g  17 g Oral Daily PRN [facility], DPM        acetaminophen (TYLENOL) tablet 650 mg  650 mg Oral Q6H PRN [facility], DPM   650 mg at 01/30/22 6000    Or    acetaminophen (TYLENOL) suppository 650 mg  650 mg Rectal Q6H PRN [facility], D[facility]        potassium chloride (KLOR-CON M) extended release tablet 40 mEq  40 mEq Oral PRN [facility], DPM        Or    potassium bicarb-citric acid (EFFER-K) effervescent tablet 40 mEq  40 mEq Oral PRN [facility], DPM        Or    potassium chloride 10 mEq/100 mL IVPB (Peripheral Line)  10 mEq IntraVENous PRN [facility], DPM        insulin lispro (HUMALOG) injection vial 0-12 Units  0-12 Units SubCUTAneous TID [facility], DPM   6 Units at 01/31/22 1616    insulin lispro (HUMALOG) injection vial 0-6 Units  0-6 Units SubCUTAneous Nightly [facility], DPM   1 Units at 01/30/22 2001    glucose (GLUTOSE) 40 % oral gel 15 g  15 g Oral PRN [facility], DPM   15 g at 01/26/22 0350    dextrose 50 % IV solution  12.5 g IntraVENous PRN [facility], DPM        glucagon (rDNA) injection 1 mg  1 mg IntraMUSCular PRN [facility], DPM        dextrose 5 % solution  100 mL/hr IntraVENous PRN [facility], DPM           ASSESSMENT:     Active Problems:    Type 2 diabetes mellitus without complication, with long-term current use of insulin ([facility])    Right foot infection    Cellulitis and abscess of toe of right foot    Abscess of right foot    Bilateral cellulitis of lower leg related to related to  uncontrolled diabetes    Right foot ulcer, with fat layer exposed ([address]    Ulcer of left foot, with fat layer exposed ([address]    CRP elevated  Resolved Problems:    * No resolved hospital problems.  *      PLAN:     Right foot wound s/p I&D x 2  - Afebrile   - S/p R foot I7D and left foot debridement   - On Cefazolin, to be switched to Rocephin on discharge until 2/14, followed by PO Keflex  - Wound culture positive for MSSA, group A strep   - Podiatry on board   - ID on board - recommending possible repeat I&D  - Pain management, Roxicodone 5mg q4    UTI   - Culture positive for Klebsiella   - s/p Cefepime  - Ancef 2g    DM2  - A1C 10   - POCT glucose   - Lantus 25U BID  - Medium dose sliding scale   - Hypoglycemia protocol     Bipolar disorder  - Continue Remeron 7.5mg PO nightly   - Effexor  mg PO daily   - Trazodone 150 mg PO nightly     Discussed care plan with nurse after getting her input. Above plan discussed with the patient, who agreed to the above plan     DVT prophylaxis: Lovenox 40 mg SC  GI prophylaxis: Protonix 20 mg BID     Plan will be discussed with the attending, Dr. Nicole Martinez MD  Family Medicine Resident  2/1/2022 9:33 AM        Please note that this chart was generated using voice recognition Dragon dictation software.   Although every effort was made to ensure the accuracy of this automated transcription, some errors in transcription may have occurred

## 2022-02-02 ENCOUNTER — APPOINTMENT (OUTPATIENT)
Dept: MRI IMAGING | Age: 57
DRG: 364 | End: 2022-02-02
Payer: COMMERCIAL

## 2022-02-02 ENCOUNTER — CARE COORDINATION (OUTPATIENT)
Dept: CARE COORDINATION | Age: 57
End: 2022-02-02

## 2022-02-02 LAB
-: NORMAL
ABSOLUTE EOS #: 0.23 K/UL (ref 0–0.44)
ABSOLUTE IMMATURE GRANULOCYTE: 0.07 K/UL (ref 0–0.3)
ABSOLUTE LYMPH #: 2.86 K/UL (ref 1.1–3.7)
ABSOLUTE MONO #: 0.83 K/UL (ref 0.1–1.2)
ANION GAP SERPL CALCULATED.3IONS-SCNC: 7 MMOL/L (ref 9–17)
BASOPHILS # BLD: 1 % (ref 0–2)
BASOPHILS ABSOLUTE: 0.05 K/UL (ref 0–0.2)
BUN BLDV-MCNC: 19 MG/DL (ref 6–20)
BUN/CREAT BLD: ABNORMAL (ref 9–20)
CALCIUM SERPL-MCNC: 8.9 MG/DL (ref 8.6–10.4)
CHLORIDE BLD-SCNC: 101 MMOL/L (ref 98–107)
CO2: 25 MMOL/L (ref 20–31)
CREAT SERPL-MCNC: 0.94 MG/DL (ref 0.5–0.9)
DIFFERENTIAL TYPE: ABNORMAL
EOSINOPHILS RELATIVE PERCENT: 3 % (ref 1–4)
GFR AFRICAN AMERICAN: >60 ML/MIN
GFR NON-AFRICAN AMERICAN: >60 ML/MIN
GFR SERPL CREATININE-BSD FRML MDRD: ABNORMAL ML/MIN/{1.73_M2}
GFR SERPL CREATININE-BSD FRML MDRD: ABNORMAL ML/MIN/{1.73_M2}
GLUCOSE BLD-MCNC: 127 MG/DL (ref 65–105)
GLUCOSE BLD-MCNC: 161 MG/DL (ref 70–99)
GLUCOSE BLD-MCNC: 287 MG/DL (ref 65–105)
HCT VFR BLD CALC: 25.5 % (ref 36.3–47.1)
HEMOGLOBIN: 7.7 G/DL (ref 11.9–15.1)
IMMATURE GRANULOCYTES: 1 %
LYMPHOCYTES # BLD: 31 % (ref 24–43)
MCH RBC QN AUTO: 30.2 PG (ref 25.2–33.5)
MCHC RBC AUTO-ENTMCNC: 30.2 G/DL (ref 28.4–34.8)
MCV RBC AUTO: 100 FL (ref 82.6–102.9)
MONOCYTES # BLD: 9 % (ref 3–12)
NRBC AUTOMATED: 0.3 PER 100 WBC
PDW BLD-RTO: 13.9 % (ref 11.8–14.4)
PLATELET # BLD: 479 K/UL (ref 138–453)
PLATELET ESTIMATE: ABNORMAL
PMV BLD AUTO: 10.3 FL (ref 8.1–13.5)
POTASSIUM SERPL-SCNC: 4.6 MMOL/L (ref 3.7–5.3)
RBC # BLD: 2.55 M/UL (ref 3.95–5.11)
RBC # BLD: ABNORMAL 10*6/UL
REASON FOR REJECTION: NORMAL
SEG NEUTROPHILS: 57 % (ref 36–65)
SEGMENTED NEUTROPHILS ABSOLUTE COUNT: 5.3 K/UL (ref 1.5–8.1)
SODIUM BLD-SCNC: 133 MMOL/L (ref 135–144)
WBC # BLD: 9.3 K/UL (ref 3.5–11.3)
WBC # BLD: ABNORMAL 10*3/UL
ZZ NTE CLEAN UP: ORDERED TEST: NORMAL
ZZ NTE WITH NAME CLEAN UP: SPECIMEN SOURCE: NORMAL

## 2022-02-02 PROCEDURE — 36415 COLL VENOUS BLD VENIPUNCTURE: CPT

## 2022-02-02 PROCEDURE — 82947 ASSAY GLUCOSE BLOOD QUANT: CPT

## 2022-02-02 PROCEDURE — 6370000000 HC RX 637 (ALT 250 FOR IP): Performed by: PODIATRIST

## 2022-02-02 PROCEDURE — 73718 MRI LOWER EXTREMITY W/O DYE: CPT

## 2022-02-02 PROCEDURE — 85025 COMPLETE CBC W/AUTO DIFF WBC: CPT

## 2022-02-02 PROCEDURE — 99233 SBSQ HOSP IP/OBS HIGH 50: CPT | Performed by: FAMILY MEDICINE

## 2022-02-02 PROCEDURE — 6370000000 HC RX 637 (ALT 250 FOR IP)

## 2022-02-02 PROCEDURE — 6360000002 HC RX W HCPCS: Performed by: INTERNAL MEDICINE

## 2022-02-02 PROCEDURE — 6370000000 HC RX 637 (ALT 250 FOR IP): Performed by: STUDENT IN AN ORGANIZED HEALTH CARE EDUCATION/TRAINING PROGRAM

## 2022-02-02 PROCEDURE — 99232 SBSQ HOSP IP/OBS MODERATE 35: CPT | Performed by: INTERNAL MEDICINE

## 2022-02-02 PROCEDURE — 94640 AIRWAY INHALATION TREATMENT: CPT

## 2022-02-02 PROCEDURE — 97116 GAIT TRAINING THERAPY: CPT

## 2022-02-02 PROCEDURE — 6360000002 HC RX W HCPCS: Performed by: STUDENT IN AN ORGANIZED HEALTH CARE EDUCATION/TRAINING PROGRAM

## 2022-02-02 PROCEDURE — 97535 SELF CARE MNGMENT TRAINING: CPT

## 2022-02-02 PROCEDURE — 1200000000 HC SEMI PRIVATE

## 2022-02-02 PROCEDURE — 2580000003 HC RX 258: Performed by: INTERNAL MEDICINE

## 2022-02-02 PROCEDURE — 80048 BASIC METABOLIC PNL TOTAL CA: CPT

## 2022-02-02 PROCEDURE — 94761 N-INVAS EAR/PLS OXIMETRY MLT: CPT

## 2022-02-02 RX ORDER — HYDROXYZINE HYDROCHLORIDE 10 MG/1
10 TABLET, FILM COATED ORAL 3 TIMES DAILY PRN
Status: DISCONTINUED | OUTPATIENT
Start: 2022-02-02 | End: 2022-02-07

## 2022-02-02 RX ADMIN — AMPICILLIN SODIUM AND SULBACTAM SODIUM 3000 MG: 2; 1 INJECTION, POWDER, FOR SOLUTION INTRAMUSCULAR; INTRAVENOUS at 17:27

## 2022-02-02 RX ADMIN — ACETAMINOPHEN 650 MG: 325 TABLET ORAL at 15:40

## 2022-02-02 RX ADMIN — FLUCONAZOLE 150 MG: 100 TABLET ORAL at 22:03

## 2022-02-02 RX ADMIN — PREGABALIN 200 MG: 100 CAPSULE ORAL at 11:26

## 2022-02-02 RX ADMIN — DICYCLOMINE HYDROCHLORIDE 10 MG: 10 CAPSULE ORAL at 20:31

## 2022-02-02 RX ADMIN — BUDESONIDE AND FORMOTEROL FUMARATE DIHYDRATE 2 PUFF: 80; 4.5 AEROSOL RESPIRATORY (INHALATION) at 11:59

## 2022-02-02 RX ADMIN — BUDESONIDE AND FORMOTEROL FUMARATE DIHYDRATE 2 PUFF: 80; 4.5 AEROSOL RESPIRATORY (INHALATION) at 20:27

## 2022-02-02 RX ADMIN — OXYCODONE HYDROCHLORIDE 5 MG: 5 TABLET ORAL at 11:26

## 2022-02-02 RX ADMIN — AMPICILLIN SODIUM AND SULBACTAM SODIUM 3000 MG: 2; 1 INJECTION, POWDER, FOR SOLUTION INTRAMUSCULAR; INTRAVENOUS at 06:23

## 2022-02-02 RX ADMIN — PREGABALIN 200 MG: 100 CAPSULE ORAL at 20:31

## 2022-02-02 RX ADMIN — VENLAFAXINE HYDROCHLORIDE 150 MG: 150 CAPSULE, EXTENDED RELEASE ORAL at 11:26

## 2022-02-02 RX ADMIN — INSULIN GLARGINE 25 UNITS: 100 INJECTION, SOLUTION SUBCUTANEOUS at 20:37

## 2022-02-02 RX ADMIN — INSULIN LISPRO 3 UNITS: 100 INJECTION, SOLUTION INTRAVENOUS; SUBCUTANEOUS at 20:36

## 2022-02-02 RX ADMIN — DIPHENHYDRAMINE HYDROCHLORIDE 25 MG: 50 INJECTION, SOLUTION INTRAMUSCULAR; INTRAVENOUS at 07:36

## 2022-02-02 RX ADMIN — PANTOPRAZOLE SODIUM 20 MG: 40 TABLET, DELAYED RELEASE ORAL at 06:23

## 2022-02-02 RX ADMIN — OXYCODONE HYDROCHLORIDE 5 MG: 5 TABLET ORAL at 20:18

## 2022-02-02 RX ADMIN — AMPICILLIN SODIUM AND SULBACTAM SODIUM 3000 MG: 2; 1 INJECTION, POWDER, FOR SOLUTION INTRAMUSCULAR; INTRAVENOUS at 11:26

## 2022-02-02 RX ADMIN — MIRTAZAPINE 7.5 MG: 15 TABLET, FILM COATED ORAL at 20:31

## 2022-02-02 RX ADMIN — DICYCLOMINE HYDROCHLORIDE 10 MG: 10 CAPSULE ORAL at 17:27

## 2022-02-02 RX ADMIN — FLUTICASONE PROPIONATE 1 SPRAY: 50 SPRAY, METERED NASAL at 10:14

## 2022-02-02 RX ADMIN — INSULIN GLARGINE 25 UNITS: 100 INJECTION, SOLUTION SUBCUTANEOUS at 10:14

## 2022-02-02 RX ADMIN — TRAZODONE HYDROCHLORIDE 150 MG: 100 TABLET ORAL at 20:31

## 2022-02-02 RX ADMIN — OXYCODONE HYDROCHLORIDE 5 MG: 5 TABLET ORAL at 07:36

## 2022-02-02 RX ADMIN — OXYCODONE HYDROCHLORIDE 5 MG: 5 TABLET ORAL at 15:40

## 2022-02-02 RX ADMIN — AMPICILLIN SODIUM AND SULBACTAM SODIUM 3000 MG: 2; 1 INJECTION, POWDER, FOR SOLUTION INTRAMUSCULAR; INTRAVENOUS at 00:00

## 2022-02-02 ASSESSMENT — PAIN DESCRIPTION - PAIN TYPE
TYPE: ACUTE PAIN;SURGICAL PAIN

## 2022-02-02 ASSESSMENT — PAIN DESCRIPTION - LOCATION
LOCATION: FOOT;LEG
LOCATION: FOOT
LOCATION: FOOT;LEG

## 2022-02-02 ASSESSMENT — PAIN SCALES - GENERAL
PAINLEVEL_OUTOF10: 10
PAINLEVEL_OUTOF10: 6
PAINLEVEL_OUTOF10: 10
PAINLEVEL_OUTOF10: 2

## 2022-02-02 ASSESSMENT — ENCOUNTER SYMPTOMS
PHOTOPHOBIA: 0
COLOR CHANGE: 1
COUGH: 0
ABDOMINAL DISTENTION: 0
SHORTNESS OF BREATH: 0
EYE DISCHARGE: 0
APNEA: 0

## 2022-02-02 ASSESSMENT — PAIN DESCRIPTION - DESCRIPTORS
DESCRIPTORS: ACHING;CRAMPING
DESCRIPTORS: DISCOMFORT

## 2022-02-02 ASSESSMENT — PAIN DESCRIPTION - ORIENTATION
ORIENTATION: RIGHT
ORIENTATION: RIGHT

## 2022-02-02 ASSESSMENT — PAIN - FUNCTIONAL ASSESSMENT: PAIN_FUNCTIONAL_ASSESSMENT: ACTIVITIES ARE NOT PREVENTED

## 2022-02-02 NOTE — CARE COORDINATION
HC phoned and spoke briefly to the patient who stated that she was doing   pretty good. Someone came in to patients room and started talking to her,  patient stated that she would call the [facility] back.     Plan of Care  [facility] will consult ACM if continued contact is necessary

## 2022-02-02 NOTE — PROGRESS NOTES
Occupational Therapy  Facility/Department: [address] ORTHO/MED SURG  Daily Treatment Note  NAME: Silver Springer  : 1965  MRN: [id number]    Date of Service: 2022    Discharge Recommendations:  Patient would benefit from continued therapy after discharge  OT Equipment Recommendations  Equipment Needed: Yes  Mobility Devices: ADL Assistive Devices  ADL Assistive Devices: Grab Bars - toilet;Transfer Tub Bench; Toileting - 3-in-1 Commode    Assessment   Performance deficits / Impairments: Decreased functional mobility ; Decreased ADL status; Decreased endurance;Decreased high-level IADLs;Decreased safe awareness;Decreased balance;Decreased strength;Decreased cognition  Prognosis: Fair  Patient Education: Pt ed on OT role, OT POC, importance of activity, WB status, importance of maintaining WB status, RW use, transfer training, and importance of continued OT. Fair return  Activity Tolerance  Activity Tolerance: Patient Tolerated treatment well  Activity Tolerance: limited by decreased ability to maintain WB status  Safety Devices  Type of devices: Gait belt;Call light within reach; Left in bed;Nurse notified (PTA in room at OT exit)  Restraints  Initially in place: No         Patient Diagnosis(es): The primary encounter diagnosis was Right foot infection. Diagnoses of Elevated d-dimer and Ulcer of left foot, with fat layer exposed ([address] were also pertinent to this visit.       has a past medical history of Acid reflux, Acute cystitis with hematuria, Acute non-recurrent maxillary sinusitis, Asthma, Bipolar 1 disorder ([address] Bipolar disorder, mixed ([address] BMI 34.0-34.9,adult, Cannabis use disorder, severe, dependence ([address] Cerebrovascular accident (CVA) ([address] Chest pain, Chronic renal insufficiency, Cocaine abuse ([address] COVID-19 virus RNA test result unknown, DDD (degenerative disc disease), cervical, Diabetes mellitus ([address] Dizziness, Fibromyalgia, History of stroke, Homicidal ideation, Hyperglycemia, Hypertension, Hypotension, IDDM (insulin dependent diabetes mellitus), Lupus ([facility] Migraine, Neuropathy, Neuropathy, Polysubstance abuse ([facility] Post traumatic stress disorder (PTSD), Posttraumatic stress disorder, Recurrent depression ([address] Recurrent major depressive disorder, in partial remission ([address] Screening mammogram, encounter for, Severe recurrent major depression with psychotic features ([facility] Severe recurrent major depression without psychotic features ([address] Stroke (cerebrum) ([address] Stroke ([address] Suicidal ideation, Suicidal intent, Vitamin D deficiency, and White matter changes. has a past surgical history that includes Abscess Drainage; chest tube insertion; Abdomen surgery; Cataract removal with implant (Bilateral); LASIK (Bilateral); Finger amputation (Left, 03/13/2021); Hand surgery (Right, 09/14/2021); Hand surgery (Right, 09/15/2021); Finger amputation (Right, 10/11/2021); Foot surgery (Right, 01/27/2022); and Foot Debridement (Right, 1/27/2022). Restrictions  Restrictions/Precautions  Restrictions/Precautions: Weight Bearing,Up as Tolerated,Fall Risk  Required Braces or Orthoses?: Yes  Lower Extremity Weight Bearing Restrictions  Right Lower Extremity Weight Bearing: Non Weight Bearing  Partial Weight Bearing Percentage Or Pounds: WB to R heel only  Left Lower Extremity Weight Bearing: Weight Bearing As Tolerated  Upper Extremity Weight Bearing Restrictions  Other: Non weight bearing to right foot. WBAT to left foot with Sx shoe per podiatry note 1/28/22  Required Braces or Orthoses  Right Lower Extremity Brace:  (\"Foot/ankle post surgical boot\" Please apply to b/l feet for weight bearing)  Left Lower Extremity Brace:  (\"Foot/ankle post surgical boot\" Please apply to b/l feet for weight bearing)  Position Activity Restriction  Other position/activity restrictions: Weight bearing to heel of right foot.  WBAT to left foot     Subjective   General  Patient assessed for rehabilitation services?: Yes  Family / mobility  Supine to Sit: Stand by assistance  Sit to Supine: Unable to assess  Scooting: Stand by assistance  Comment: HOB elevated ~30 degrees, pt retired to [employer] with PTA at OT exit  Transfers  Sit to stand: Minimal assistance;2 Person assistance  Stand to sit: Minimal assistance;2 Person assistance  Transfer Comments: Pt completed functional transfers from elevated EOB requiring Min A X2 with Mod VCs/TCs for hand placement. Pt demo ability to maintain heel touch WB to RLE during sit > stand however did not maintain with stand > sit despite Max VCs/TCs                      Cognition  Overall Cognitive Status: Exceptions  Following Commands: Follows multistep commands with increased time; Follows multistep commands with repitition  Safety Judgement: Decreased awareness of need for assistance;Decreased awareness of need for safety  Problem Solving: Assistance required to correct errors made;Assistance required to identify errors made  Insights: Decreased awareness of deficits  Initiation: Requires cues for some  Sequencing: Requires cues for some  Cognition Comment: Pt extremely impulsive and demo poor maintenance of WB status of RLE despite education and ablility to verbalize WB status                                         Plan   Plan  Times per week: 3-4x/wk  Current Treatment Recommendations: Strengthening,Endurance Training,Equipment Evaluation, Education, & procurement,Self-Care / ADL,Patient/Caregiver Education & Training,Home Management Training,Safety Education & Training,Functional Mobility Training,Balance Training,Cognitive Reorientation    AM-PAC Score        AM-[facility] Inpatient Daily Activity Raw Score: 21 (02/02/22 1308)  AM-PAC Inpatient ADL T-Scale Score : 44.27 (02/02/22 1308)  ADL Inpatient CMS 0-100% Score: 32.79 (02/02/22 1308)  ADL Inpatient CMS G-Code Modifier : Emigdio Hartman (02/02/22 1308)    Goals  Short term goals  Time Frame for Short term goals: Patient will, by discharge  Short term goal 1: lou NAGEL ADLs at Mod I using AE PRN  Short term goal 2: demo toileting tasks at Supervision using AE PRN  Short term goal 3: demo functional transfers/mobility using LRD at SBA to engage in ADLs  Short term goal 4: demo 8+ min of dynamic standing tolerance with uni/bilateral hand release at SBA to engage in ADLs       Therapy Time   Individual Concurrent Group Co-treatment   Time In 1101         Time Out 1115         Minutes 14         Timed Code Treatment Minutes: 8 Minutes (partial co-tx with PTA)       ALEXANDRA Zhou/L

## 2022-02-02 NOTE — PROGRESS NOTES
[facility] -   Infectious diseases evaluation  admission date 1/22/2022    reason for consultation:   Diabetic foot infection    Impression :   Current:  · Right diabetic foot infection, GAS  · Cellulitis both feet  · bandemia  · Bacteriuria vs UTI kleb S ancef  · CRP elevation    Other:  ·   Discussion / summary of stay / plan of care   ·   Recommendations   · foot cx MSSA R clinda  and GAS- finegoldia and aerococcus  · On  Ancef-2/1 switch to Unasyn   · Observe response to antibiotic vs  possible consideration of another I&D  · FU office in 2 weeks  · Recent UC 1/24 : Klebsiella- no dysuria - but lower abd pain - on keflex till 1/29 completed    Infection Control Recommendations   · [place] Precautions  · Contact Isolation       Antimicrobial Stewardship Recommendations   · Simplification of therapy  · Targeted therapy  Coordination ofOutpatient Care:   · Estimated Length of IV antimicrobials:  · Patient will need Midline / picc Catheter Insertion:   · Patient will need SNF:  · Patient will need outpatient wound care:     History of Present Illness:   Initial history:  Bj Sandra is a 49y.o.-year-old female with diabetes very well-known to my service, stepped on a piece of glass a few days prior to admission presented with swelling and drainage from right foot, culture came back with group A strep,  Patient has neuropathy from diabetes  WBC elevated 14,  sed [id number],     Podiatry debrided the superficial ulcer and felt it was not too deep yet there was cellulitis of both feet.   Hemoglobin A1c was 10    MRI of the left foot no osteomyelitis  MRI of the right foot no osteomyelitis    Leukocytosis responded to antibiotics, infectious consulted for antibiotic management  Patient is on cefepime and clindamycin            R foot very swollen and tender to light touch - suspect deeper involvement and needs more I/D    1/26  Pt is afebrile and stable this morning. WBC trending up (14 today). UC 1/24 growing Klebsiella. CT right leg 1/25 shows an abscess  Planned for I/D on 1/27  Pain still ++    1/27  Pt seen and examined at bedside, afebrile and hypotensive this morning. Endorsed a lot of pain overnight. WBC improved to 11   To go to OR today for I+D of R foot abscess. Started on Keflex until 1/29 for UTI  Will monitor on antibiotics and await podiatry recs    1/28  Pt afebrile and stable s/p I+D right foot yesterday. Pt c/o pain this morning but is managing. Pt also endorsing abdominal discomfort. XR Abd showing increased stool burden. WBC did increase to 15 from 11 yesterday. Wound Cx 1/27: pending but GPC in pairs seen. Will monitor on Keflex and Clinda    1/29  No fever  Lab: MSSA in wound , but clinda is R  Switching to ceftriaxone  Foot better - less edema and drainage -tender still    1/30  Right foot  but discharge is minimal from it. Otherwise she has no abdominal pain or diarrhea CRP is improved 54    1/31  Alert appropriate, right foot continues to be swollen very tender to touch and it is painful. The wound is not draining much though. No redness noticed. Podiatry watching at this point. Continue antibiotic  WBC is 10 and CRP continues to be 53 has not improved in today    2/1  Right foot remains tender to light touch over the sole and over the first metatarsal  Edema slightly better over the ankle and the dorsum but continues to be present over the shoulder without any drainage  Discussed with Dr. Hernandez. , he will wait another few days and allow the antibiotics to work before deciding further procedure  Cultures are now growing Aerococcus and Mechelle via, on top of the MSSA and the group A strep    Interval changes  2/1/2022     /80   Pulse 79   Temp 98.6 °F (37 °C) (Oral)   Resp 18   Ht 5' 6\" (1.676 m)   Wt 242 lb 8 oz (110 kg)   LMP  (LMP Unknown)   SpO2 100%   BMI 39.14 kg/m²    Summary of relevant labs:  Labs:  W14 > 11>15-10  [id number]  -54-53  Micro:  cx foot pus GAS  Uc 1/24: Klebsiella  Wound Cx 1/27: GPC pairs - cx pend    Imaging:  XR abd 1/27: Increased stool burden seen diffusely throughout the colon suggesting   clinical presentation of constipation.  Phleboliths seen on both sides of the   pelvis. CT R foot 1/25:  1. Shallow soft tissue ulceration plantar and medial to the 1st metatarsal   base with an underlying area of possible abscess versus phlegmon measuring   2.8 x 1.1 x 0.9 cm.   2. Extensive subcutaneous fat stranding compatible with cellulitis.  No soft   tissue gas. 3. No evidence of osteomyelitis or other acute osseous abnormality. U/S renal 1/25: Unremarkable    MRI R foot 1/23:  Subcutaneous edema and swelling, most notably affecting the dorsum of the   foot, which may reflect underlying cellulitis.  No discrete organized fluid   collection is seen within the limitations of a noncontrast study.       No imaging features of acute osteomyelitis appreciated. MRI L foot: 1/23:   Limited evaluation due to patient motion artifact on several sequences.       Skin thickening and subcutaneous edema seen within the plantar soft tissues   of the foot at the level of the proximal metatarsals, which may reflect   underlying cellulitis.  Within the limitations of a noncontrast study, no   discrete organized fluid collection is seen.       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 I have personally reviewed the past medical history, past surgical history, medications, social history, and family history, and I haveupdated the database accordingly. Allergies:   Bactrim [sulfamethoxazole-trimethoprim] and Adhesive tape     Review of Systems:     Review of Systems   Constitutional: Negative for activity change, appetite change, chills and diaphoresis. HENT: Negative for congestion. Eyes: Negative for photophobia and discharge. Respiratory: Negative for apnea and cough. Cardiovascular: Negative for chest pain. Gastrointestinal: Negative for abdominal distention. Endocrine: Negative for cold intolerance, polydipsia and polyphagia. Genitourinary: Negative for dysuria and flank pain. Musculoskeletal: Negative for arthralgias. Skin: Positive for color change and wound. Allergic/Immunologic: Negative for environmental allergies, food allergies and immunocompromised state. Neurological: Negative for dizziness, tremors, seizures, syncope, light-headedness, numbness and headaches. Hematological: Negative for adenopathy. Psychiatric/Behavioral: Negative for agitation. Physical Examination :       Physical Exam  Constitutional:       General: She is not in acute distress. Appearance: Normal appearance. She is not ill-appearing, toxic-appearing or diaphoretic. HENT:      Head: Normocephalic and atraumatic. Nose: Nose normal. No congestion or rhinorrhea. Eyes:      General: No scleral icterus. Pupils: Pupils are equal, round, and reactive to light. Cardiovascular:      Rate and Rhythm: Normal rate and regular rhythm. Heart sounds: Normal heart sounds. No murmur heard. Pulmonary:      Effort: No respiratory distress. Breath sounds: Normal breath sounds. No stridor. No wheezing. Abdominal:      General: There is no distension. Palpations: Abdomen is soft. There is no mass. Tenderness: There is no abdominal tenderness. Hernia: No hernia is present. Genitourinary:     Comments: Urine fatuma  Musculoskeletal:         General: Swelling present. No tenderness, deformity or signs of injury. Cervical back: Neck supple. No rigidity or tenderness. Skin:     General: Skin is dry. Coloration: Skin is not jaundiced. Findings: Erythema present.    Neurological:      Mental Status: She is alert and oriented to person, place, and time. Mental status is at baseline. Psychiatric:         Mood and Affect: Mood normal.         Thought Content:  Thought content normal.         Past Medical History:     Past Medical History:   Diagnosis Date    Acid reflux 5/29/2017    Acute cystitis with hematuria 10/11/2016    Acute non-recurrent maxillary sinusitis 11/28/2017    Asthma     Bipolar 1 disorder ([address] 1/4/2018    Bipolar disorder, mixed ([address]     BMI 34.0-34.9,adult 11/28/2017    Cannabis use disorder, severe, dependence ([address] 12/19/2017    Cerebrovascular accident (CVA) ([address] 6/14/2017    Chest pain 11/5/2016    Chronic renal insufficiency     Cocaine abuse ([address] 1/5/2018    COVID-19 virus RNA test result unknown     DDD (degenerative disc disease), cervical     Diabetes mellitus ([address]     Dizziness 6/13/2017    Fibromyalgia     History of stroke 9/9/2017    Homicidal ideation 11/6/2017    Hyperglycemia     Hypertension     Hypotension 1/18/2019    IDDM (insulin dependent diabetes mellitus) 12/21/2015    Lupus ([address]     Migraine     Neuropathy     Neuropathy     Polysubstance abuse ([address] 9/17/2017    Post traumatic stress disorder (PTSD)     Posttraumatic stress disorder 5/29/2017    Recurrent depression ([address] 5/29/2017    Recurrent major depressive disorder, in partial remission ([address] 11/28/2017    Screening mammogram, encounter for 11/28/2017    Severe recurrent major depression with psychotic features ([address] 12/19/2017    Severe recurrent major depression without psychotic features ([address] 12/19/2017    Stroke (cerebrum) ([address] 6/14/2017    Stroke ([address]     per chart notes    Suicidal ideation     Suicidal intent 3/10/2017    Vitamin D deficiency 11/28/2017    White matter changes 6/13/2017       Past Surgical  History:     Past Surgical History:   Procedure Laterality Date    ABDOMEN SURGERY      drain tube    ABSCESS DRAINAGE      right buttock    CATARACT REMOVAL WITH IMPLANT Bilateral     CHEST TUBE INSERTION      FINGER AMPUTATION Left 03/13/2021    LEFT RING FINER AMPUTATION performed by Jennifer [facility] MD at [facility] Right 10/11/2021    AMPUTATION RIGHT INDEX FINGER performed by Jennifer Lopez MD at [address] [place] [address] Right 1/27/2022    FOOT ABSCESS INCISION AND DRAINAGE  (PULSE LAVAGE, CULTURE SWABS) performed by Mihai Panchal DPM at [address] Right 01/27/2022    FOOT ABSCESS INCISION AND DRAINAGE (PULSE LAVAGE, CULTURE SWABS) - Right    HAND SURGERY Right 09/14/2021    I&D INDEX FINGER performed by Jennifer Lopez MD at [address] Right 09/15/2021    I&D INDEX FINGER performed by Jennifer Lopez MD at [address] [place] [address] Bilateral        Medications:      ampicillin-sulbactam  3,000 mg IntraVENous Q6H    insulin glargine  25 Units SubCUTAneous BID    enoxaparin  30 mg SubCUTAneous BID    budesonide-formoterol  2 puff Inhalation BID    dicyclomine  10 mg Oral 4x Daily    fluticasone  1 spray Each Nostril Daily    mirtazapine  7.5 mg Oral Nightly    pantoprazole  20 mg Oral BID AC    pregabalin  200 mg Oral BID    traZODone  150 mg Oral Nightly    venlafaxine  150 mg Oral Daily with breakfast    sodium chloride flush  5-40 mL IntraVENous 2 times per day    insulin lispro  0-12 Units SubCUTAneous TID WC    insulin lispro  0-6 Units SubCUTAneous Nightly       Social History:     Social History     Socioeconomic History    Marital status: Legally      Spouse name: Not on file    Number of children: Not on file    Years of education: Not on file    Highest education level: Not on file   Occupational History    Not on file   Tobacco Use    Smoking status: Never Smoker    Smokeless tobacco: Never Used   Vaping Use    Vaping Use: Never used   Substance and Sexual Activity    Alcohol use: Not Currently    Drug use: Yes     Types: Marijuana ([place]), Cocaine     Comment: + Cocaine 2/2021 also, see Care Everywhere UDS    Sexual activity: Not Currently     Partners: Male   Other Topics Concern    Not on file   Social History Narrative    Not on file     Social Determinants of Health     Financial Resource Strain: High Risk    Difficulty of Paying Living Expenses: Hard   Food Insecurity: Food Insecurity Present    Worried About Running Out of Food in the Last Year: Often true    [place] of Food in the Last Year: Often true   Transportation Needs: Unmet Transportation Needs    Lack of Transportation (Medical):  Yes    Lack of Transportation (Non-Medical): Yes   Physical Activity: Inactive    Days of Exercise per Week: 0 days    Minutes of Exercise per Session: 0 min   Stress: Stress Concern Present    Feeling of Stress : Rather much   Social Connections:     Frequency of Communication with Friends and Family: Not on file    Frequency of Social Gatherings with Friends and Family: Not on file    Attends Zoroastrianism Services: Not on file    Active Member of Clubs or Organizations: Not on file    Attends Club or Organization Meetings: Not on file    Marital Status: Not on file   Intimate Partner Violence:     Fear of Current or Ex-Partner: Not on file    Emotionally Abused: Not on file    Physically Abused: Not on file    Sexually Abused: Not on file   Housing Stability: High Risk    Unable to Pay for Housing in the Last Year: Yes    Number of [place] in the Last Year: 2    Unstable Housing in the Last Year: Yes       Family History:     Family History   Problem Relation Age of Onset    Diabetes Mother     Stroke Mother     Diabetes Father     Diabetes Sister     Diabetes Brother     Other Son         GSW    No Known Problems Sister       Medical Decision Making:   I have independently reviewed/ordered the following labs:    CBC with Differential:   Recent Labs     01/31/22  0431 02/01/22  0552   WBC 10.2 8.4   HGB 7.4* 7.7*   HCT 23.3* 24.8*   * 427   LYMPHOPCT 25 31   MONOPCT 8 8 BMP:  Recent Labs     01/31/22  0431 02/01/22  0552   * 138   K 4.1 4.3    103   CO2 23 24   BUN 14 13   CREATININE 0.86 0.78     Hepatic Function Panel:   No results for input(s): PROT, LABALBU, BILIDIR, IBILI, BILITOT, ALKPHOS, ALT, AST in the last 72 hours. No results for input(s): RPR in the last 72 hours. No results for input(s): HIV in the last 72 hours. No results for input(s): BC in the last 72 hours. Lab Results   Component Value Date    CREATININE 0.78 02/01/2022    GLUCOSE 108 02/01/2022       Detailed results: Thank you for allowing us to participate in the care of this patient. Please call with questions. This note is created with the assistance of a speech recognition program.  While intending to generate adocument that actually reflects the content of the visit, the document can still have some errors including those of syntax and sound a like substitutions which may escape proof reading. It such instances, actual meaningcan be extrapolated by contextual diversion.         Maylin Puga, Infectious Diseases

## 2022-02-02 NOTE — PROGRESS NOTES
depression ([facility] Recurrent major depressive disorder, in partial remission ([address] Screening mammogram, encounter for, Severe recurrent major depression with psychotic features ([address] Severe recurrent major depression without psychotic features ([address] Stroke (cerebrum) ([facility] Stroke ([address] Suicidal ideation, Suicidal intent, Vitamin D deficiency, and White matter changes. has a past surgical history that includes Abscess Drainage; chest tube insertion; Abdomen surgery; Cataract removal with implant (Bilateral); LASIK (Bilateral); Finger amputation (Left, 03/13/2021); Hand surgery (Right, 09/14/2021); Hand surgery (Right, 09/15/2021); Finger amputation (Right, 10/11/2021); Foot surgery (Right, 01/27/2022); and Foot Debridement (Right, 1/27/2022). Restrictions  Restrictions/Precautions  Restrictions/Precautions: Weight Bearing,Up as Tolerated  Required Braces or Orthoses?: Yes (Sx shoe)  Lower Extremity Weight Bearing Restrictions  Right Lower Extremity Weight Bearing: Non Weight Bearing  Left Lower Extremity Weight Bearing: Weight Bearing As Tolerated  Upper Extremity Weight Bearing Restrictions  Other: Non weight bearing to right foot. WBAT to left foot with Sx shoe per podiatry note 1/28/22  Subjective   General  Chart Reviewed: Yes  Response To Previous Treatment: Patient with no complaints from previous session. Family / Caregiver Present: No  Subjective  Subjective: RN and pt agreeable to PT. Pt supine in bed at start of session with 10/10 c/o pain. . Surgical shoe donned to L foot prior to mobility.   Pain Screening  Patient Currently in Pain: Yes  Pain Assessment  Pain Assessment: 0-10  Pain Level: 10  Patient's Stated Pain Goal: No pain  Pain Type: Acute pain;Surgical pain  Pain Location: Foot;Leg  Pain Orientation: Right  Pain Descriptors: Aching;Cramping  Vital Signs  Patient Currently in Pain: Yes       Orientation  Orientation  Overall Orientation Status: Within Normal Limits  Cognition Cognition  Overall Cognitive Status: Exceptions  Safety Judgement: Decreased awareness of need for assistance;Decreased awareness of need for safety  Sequencing: Requires cues for some  Cognition Comment: Pt impulsive, standing up without warning. Pt aware of WB status, however poor return with maintenance of NWB RLE t/o amb. Objective   Bed mobility  Supine to Sit: Contact guard assistance  Scooting: Contact guard assistance  Comment: HOB elevated. Pt supine in bed upon entry and seated EOB at exit this date, ok per RN. Transfers  Sit to Stand: Minimal Assistance  Stand to sit: Minimal Assistance  Bed to Chair: Unable to assess  Comment: Pt performed STS from bed with RW for UE support, poor return for hand placement. Fair return of WB restrictions during STS with cueing. Ambulation  Ambulation?: Yes  Ambulation 1  Surface: level tile  Device: Rolling Walker  Assistance: Minimal assistance  Quality of Gait: no LOB noted  Gait Deviations: Shuffles  Distance: 3 feet forward/retro  Comments: Pt amb 3 feet forward with RW [place], max cueing for WB restrictions t/o with poor return. Pt requested to return to bed d/t pain in RLE. Pt is impulsive and has poor safety awareness. Stairs/Curb  Stairs?: No     Balance  Posture: Good  Sitting - Static: Good  Sitting - Dynamic: Fair;+  Standing - Static: Fair;-  Standing - Dynamic: Poor  Comments: Standing balance assessed with RW. Exercises  Seated LE exercise program: Long Arc Quads, hip abduction/adduction, heel/toe raises, and marches. Reps: x10  Upper extremity exercises: Bicep curl, shoulder flexion/extension, tricep curl. Reps: x10  Comments: Pt performed seated exercises and UEE while sitting EOB. Pt deferred further exercises d/t pain.      AM-PAC Score  AM-PAC Inpatient Mobility Raw Score : 17 (02/02/22 1201)  AM-PAC Inpatient T-Scale Score : 42.13 (02/02/22 1201)  Mobility Inpatient CMS 0-100% Score: 50.57 (02/02/22 1201)  Mobility Inpatient CMS G-Code Modifier : CK (02/02/22 1201)          Goals  Short term goals  Time Frame for Short term goals: 14 visits  Short term goal 1: transfers with SBA and RW  Short term goal 2: amb 75 ft with a RW x SBA with NWB RLE, WBAT LLE with sx shoe  Short term goal 3: to be independent with bed mobility  Short term goal 4: 20 min exercise program x SBA  Patient Goals   Patient goals : Return home    Plan    Plan  Times per week: 5-6x wk  Times per day: Daily  Current Treatment Recommendations: Strengthening,Functional Mobility Training,Gait Training,Safety Education & Training,Endurance Training,Pain Management,Balance Training,IADL Training,Home Exercise Program,Neuromuscular Re-education,Transfer Training,Patient/Caregiver Education & Training,ADL/Self-care Training  Safety Devices  Type of devices: All fall risk precautions in place,Gait belt,Call light within reach,Left in bed,Nurse notified  Restraints  Initially in place: No     Therapy Time   Individual Concurrent Group Co-treatment   Time In 1102         Time Out 1126         Minutes 24         Timed Code Treatment Minutes: 15 Minutes (Co-treat with OT)       Luh Carter    Treatment performed by Student PTA under the supervision of co-signing PTA who agrees with all treatment and documentation.    Karyn Duverney, HERON

## 2022-02-02 NOTE — PROGRESS NOTES
[address] [facility]  Progress Note    Date:   2/2/2022  Patient name:  Negra Escobar  Date of admission:  1/22/2022  4:43 PM  MRN:   [id number]  YOB: 1965    SUBJECTIVE/Last 24 hours update:     Patient seen and examined at the bed side , no new acute events overnight. Patient states she is still in a significant amount of pain despite Roxicodone. MRI results remarkable for fluid collection of 4.4 x 1.2 x 1.8 cm which may be abscess or phlegmon. Per Podiatry, no surgical intervention at this time. Notes from nursing staff and Consults had been reviewed, and the overnight progress had been checked with the nursing staff as well.     REVIEW OF SYSTEMS:      CONSTITUTIONAL:  no fevers, no headcahes  EYES: negative for blury vision  HEENT: No headaches, No nasal congestion, no difficulty swallowing  RESPIRATORY:negative for dyspnea, no wheezing, no Cough  CARDIOVASCULAR: negative for chest pain, no palpitations  GASTROINTESTINAL: no nausea, no vomiting, no change in bowel habits, no abdominal pain   GENITOURINARY: negative for dysuria, no hematuria   MUSCULOSKELETAL: + right foot pain   NEUROLOGICAL: No  Weakness or numbness    PAST MEDICAL HISTORY:      has a past medical history of Acid reflux, Acute cystitis with hematuria, Acute non-recurrent maxillary sinusitis, Asthma, Bipolar 1 disorder ([address] Bipolar disorder, mixed ([address] BMI 34.0-34.9,adult, Cannabis use disorder, severe, dependence ([address] Cerebrovascular accident (CVA) ([address] Chest pain, Chronic renal insufficiency, Cocaine abuse ([address] COVID-19 virus RNA test result unknown, DDD (degenerative disc disease), cervical, Diabetes mellitus ([address] Dizziness, Fibromyalgia, History of stroke, Homicidal ideation, Hyperglycemia, Hypertension, Hypotension, IDDM (insulin dependent diabetes mellitus), Lupus ([address] Migraine, Neuropathy, Neuropathy, Polysubstance abuse ([address] Post traumatic stress disorder (PTSD), Posttraumatic stress disorder, Recurrent depression ([facility] Recurrent major depressive disorder, in partial remission ([facility] Screening mammogram, encounter for, Severe recurrent major depression with psychotic features ([facility] Severe recurrent major depression without psychotic features ([address] Stroke (cerebrum) ([facility] Stroke ([facility] Suicidal ideation, Suicidal intent, Vitamin D deficiency, and White matter changes. PAST SURGICAL HISTORY:      has a past surgical history that includes Abscess Drainage; chest tube insertion; Abdomen surgery; Cataract removal with implant (Bilateral); LASIK (Bilateral); Finger amputation (Left, 03/13/2021); Hand surgery (Right, 09/14/2021); Hand surgery (Right, 09/15/2021); Finger amputation (Right, 10/11/2021); Foot surgery (Right, 01/27/2022); and Foot Debridement (Right, 1/27/2022). SOCIAL HISTORY:      reports that she has never smoked. She has never used smokeless tobacco. She reports previous alcohol use. She reports current drug use. Drugs: Marijuana (Weed) and Cocaine. FAMILY HISTORY:     family history includes Diabetes in her brother, father, mother, and sister; No Known Problems in her sister; Other in her son; Stroke in her mother. HOME MEDICATIONS:      Prior to Admission medications    Medication Sig Start Date End Date Taking? Authorizing Provider   ertapenem [facility]) infusion Infuse 1,000 mg intravenously every 24 hours for 14 days Compound per protocol. 1/30/22 2/13/22 Yes Maylin Neumann MD   pregabalin (LYRICA) 200 MG capsule Take 1 capsule by mouth 2 times daily for 30 days.  1/20/22 2/19/22  Juliano Tate MD   insulin glargine (LANTUS SOLOSTAR) 100 UNIT/ML injection pen Inject 30 Units into the skin 2 times daily 1/20/22   Juliano Tate MD   fluticasone [facility]) 50 MCG/ACT nasal spray 1 spray by Each Nostril route daily 12/22/21   Darcy Jaramillo MD   acetaminophen (TYLENOL) 500 MG tablet Take 2 tablets by mouth 3 times daily as needed for Pain 12/22/21   Sage Pryor MD   dicyclomine (BENTYL) 10 MG capsule Take 1 capsule by mouth 4 times daily 12/17/21   Sage Pryor MD   metFORMIN (GLUCOPHAGE) 1000 MG tablet Take 1 tablet by mouth 2 times daily (with meals) 12/17/21   Sage Pryor MD   mirtazapine (REMERON) 7.5 MG tablet Take 1 tablet by mouth nightly 12/17/21   Sage Pryor MD   pantoprazole (PROTONIX) 20 MG tablet Take 1 tablet by mouth 2 times daily (before meals) 12/17/21   Sage Pryor MD   traZODone (DESYREL) 150 MG tablet Take 1 tablet by mouth nightly 12/17/21   Sage Pryor MD   venlafaxine (EFFEXOR XR) 150 MG extended release capsule Take 1 capsule by mouth daily (with breakfast) 12/17/21   Sage Pryor MD   ibuprofen (ADVIL;MOTRIN) 800 MG tablet Take 1 tablet by mouth every 8 hours as needed for Pain 11/15/21 11/29/21  Mateus Silvestre MD   Lancets MISC 1 each by Does not apply route 3 times daily 11/15/21   Mateus Silvestre MD   Alcohol Swabs 70 % PADS Use 1 swab 3 times daily as needed 11/15/21   Mateus Silvestre MD   blood glucose monitor strips Test 3 times a day & as needed for symptoms of irregular blood glucose. Dispense sufficient amount for indicated testing frequency plus additional to accommodate PRN testing needs.  11/8/21   Evangelina Gill MD   Lancets MISC 1 each by Does not apply route 3 times daily 11/8/21   Evangelina Gill MD   Insulin Pen Needle ([employer] PEN NEEDLES 31G) 31G X 8 MM MISC 1 each by Does not apply route daily 11/8/21   Jennifer Geiger MD   FREESTYLE LITE strip 1 each by Does not apply route 3 times daily 11/11/20   Sage Pryor MD   albuterol sulfate  (90 Base) MCG/ACT inhaler Inhale 2 puffs into the lungs every 4 hours as needed for Wheezing 10/20/20 9/22/21  Sage Pryor MD   FLOVENT  MCG/ACT inhaler Inhale 2 puffs into the lungs 2 times daily 10/20/20   Sage Pryor MD   budesonide-formoterol (SYMBICORT) 80-4.5 MCG/ACT AERO Inhale 2 puffs into the lungs 2 times daily 10/20/20   Ca Bolanos MD       ALLERGIES:     Bactrim [sulfamethoxazole-trimethoprim] and Adhesive tape      OBJECTIVE:       Vitals:    02/01/22 0739 02/01/22 1443 02/01/22 2042 02/02/22 1142   BP: 116/80      Pulse: 79      Resp: 18   18   Temp: 97.8 °F (36.6 °C)  98.6 °F (37 °C)    TempSrc: Axillary  Oral    SpO2: 100%  100% 98%   Weight:       Height:  5' 6\" (1.676 m)           Intake/Output Summary (Last 24 hours) at 2/2/2022 1408  Last data filed at 2/2/2022 0000  Gross per 24 hour   Intake 450 ml   Output 650 ml   Net -200 ml       PHYSICAL EXAM:  General Appearance  Alert , awake , not in acute distress  HEENT - Head is normocephalic, atraumatic. Lungs - Bilateral equal air entry , no wheezes, rales or rhonchi, aeration good  Cardiovascular - Heart sounds are normal.  Regular rhythm, normal rate without murmur, gallop or rub.   Abdomen - Soft, nontender, nondistended, no masses or organomegaly  Neurologic - There are no new focal motor or sensory deficits  Skin - No bruising or bleeding on exposed skin area  Extremities - Right foot bandaged    DIAGNOSTICS:     Laboratory Testing:    Recent Results (from the past 24 hour(s))   POC Glucose Fingerstick    Collection Time: 02/01/22  4:21 PM   Result Value Ref Range    POC Glucose 244 (H) 65 - 105 mg/dL   POC Glucose Fingerstick    Collection Time: 02/01/22  8:25 PM   Result Value Ref Range    POC Glucose 218 (H) 65 - 105 mg/dL   CBC auto differential    Collection Time: 02/02/22  3:35 AM   Result Value Ref Range    WBC 9.3 3.5 - 11.3 k/uL    RBC 2.55 (L) 3.95 - 5.11 m/uL    Hemoglobin 7.7 (L) 11.9 - 15.1 g/dL    Hematocrit 25.5 (L) 36.3 - 47.1 %    .0 82.6 - 102.9 fL    MCH 30.2 25.2 - 33.5 pg    MCHC 30.2 28.4 - 34.8 g/dL    RDW 13.9 11.8 - 14.4 %    Platelets 401 (H) 304 - 453 k/uL    MPV 10.3 8.1 - 13.5 fL    NRBC Automated 0.3 (H) 0.0 per 100 WBC    Differential Type NOT REPORTED     WBC Morphology NOT REPORTED     RBC Morphology NOT REPORTED     Platelet Estimate NOT REPORTED     Seg Neutrophils 57 36 - 65 %    Lymphocytes 31 24 - 43 %    Monocytes 9 3 - 12 %    Eosinophils % 3 1 - 4 %    Basophils 1 0 - 2 %    Immature Granulocytes 1 (H) 0 %    Segs Absolute 5.30 1.50 - 8.10 k/uL    Absolute Lymph # 2.86 1.10 - 3.70 k/uL    Absolute Mono # 0.83 0.10 - 1.20 k/uL    Absolute Eos # 0.23 0.00 - 0.44 k/uL    Basophils Absolute 0.05 0.00 - 0.20 k/uL    Absolute Immature Granulocyte 0.07 0.00 - 0.30 k/uL   SPECIMEN REJECTION    Collection Time: 02/02/22  3:35 AM   Result Value Ref Range    Specimen Source . BLOOD     Ordered Test BMPX     Reason for Rejection Unable to perform testing: Specimen hemolyzed. - NOT REPORTED    Basic Metabolic Panel w/ Reflex to MG    Collection Time: 02/02/22  5:42 AM   Result Value Ref Range    Glucose 161 (H) 70 - 99 mg/dL    BUN 19 6 - 20 mg/dL    CREATININE 0.94 (H) 0.50 - 0.90 mg/dL    Bun/Cre Ratio NOT REPORTED 9 - 20    Calcium 8.9 8.6 - 10.4 mg/dL    Sodium 133 (L) 135 - 144 mmol/L    Potassium 4.6 3.7 - 5.3 mmol/L    Chloride 101 98 - 107 mmol/L    CO2 25 20 - 31 mmol/L    Anion Gap 7 (L) 9 - 17 mmol/L    GFR Non-African American >60 >60 mL/min    GFR African American >60 >60 mL/min    GFR Comment          GFR Staging NOT REPORTED    POC Glucose Fingerstick    Collection Time: 02/02/22  5:51 AM   Result Value Ref Range    POC Glucose 127 (H) 65 - 105 mg/dL         Imaging/Diagonstics:  MRI:   Soft tissue defect along the plantar and medial surface of the midfoot with   suspected irregular fluid collection measuring 4.4 x 1.2 x 1.8 cm, which may   represent an abscess or phlegmon.       Subcutaneous edema may represent underlying cellulitis.       No discrete evidence to suggest osteomyelitis.          Current Facility-Administered Medications   Medication Dose Route Frequency Provider Last Rate Last Admin    ampicillin-sulbactam (UNASYN) 3000 mg ivpb minibag  3,000 mg IntraVENous Q6H Maylin Zarina Espinoza MD   Stopped at 02/02/22 1236    insulin glargine (LANTUS) injection vial 25 Units  25 Units SubCUTAneous BID Lisa Garay MD   25 Units at 02/02/22 1014    simethicone (MYLICON) chewable tablet 80 mg  80 mg Oral Q6H PRN Lisa Garay MD   80 mg at 02/01/22 1704    diphenhydrAMINE (BENADRYL) injection 25 mg  25 mg IntraVENous Q8H PRN Kaz West MD   25 mg at 02/02/22 0736    enoxaparin (LOVENOX) injection 30 mg  30 mg SubCUTAneous BID [facility], DPM   30 mg at 02/01/22 2028    albuterol sulfate  (90 Base) MCG/ACT inhaler 2 puff  2 puff Inhalation Q4H PRN [facility] DPM   2 puff at 02/01/22 1712    oxyCODONE (ROXICODONE) immediate release tablet 5 mg  5 mg Oral Q4H PRN [facility] DPM   5 mg at 02/02/22 1126    budesonide-formoterol (SYMBICORT) 80-4.5 MCG/ACT inhaler 2 puff  2 puff Inhalation BID [facility], DPM   2 puff at 02/02/22 1159    dicyclomine (BENTYL) capsule 10 mg  10 mg Oral 4x Daily [facility], DPM   10 mg at 02/01/22 2031    fluticasone (FLONASE) 50 MCG/ACT nasal spray 1 spray  1 spray Each Nostril Daily [facility] DPM   1 spray at 02/02/22 1014    mirtazapine (REMERON) tablet 7.5 mg  7.5 mg Oral Nightly [facility], DPM   7.5 mg at 02/01/22 2031    pantoprazole (PROTONIX) tablet 20 mg  20 mg Oral BID AC [facility], DPM   20 mg at 02/02/22 6386    pregabalin (LYRICA) capsule 200 mg  200 mg Oral BID [facility], DPM   200 mg at 02/02/22 1126    traZODone (DESYREL) tablet 150 mg  150 mg Oral Nightly [facility], DPM   150 mg at 02/01/22 2032    venlafaxine (EFFEXOR XR) extended release capsule 150 mg  150 mg Oral Daily with breakfast Satinder Rodriguez, DPM   150 mg at 02/02/22 1126    sodium chloride flush 0.9 % injection 5-40 mL  5-40 mL IntraVENous 2 times per day Satinder Rodriguez, DPM   10 mL at 02/01/22 2027    sodium chloride flush 0.9 % injection 5-40 mL  5-40 mL IntraVENous PRN [place] Michael, DPM        0.9 % sodium chloride infusion  25 mL IntraVENous PRN [facility], DPM        ondansetron (ZOFRAN-ODT) disintegrating tablet 4 mg  4 mg Oral Q8H PRN Alexandra Embs, DPM   4 mg at 01/29/22 1201    Or    ondansetron (ZOFRAN) injection 4 mg  4 mg IntraVENous Q6H PRN Alexandra Embs, DPM        polyethylene glycol (GLYCOLAX) packet 17 g  17 g Oral Daily PRN Alexandra Embs, DPM        acetaminophen (TYLENOL) tablet 650 mg  650 mg Oral Q6H PRN Alexandra Embs, DPM   650 mg at 01/30/22 8412    Or    acetaminophen (TYLENOL) suppository 650 mg  650 mg Rectal Q6H PRN Alexandra Embs, DPM        potassium chloride (KLOR-CON M) extended release tablet 40 mEq  40 mEq Oral PRN Alexandra Embs, DPM        Or    potassium bicarb-citric acid (EFFER-K) effervescent tablet 40 mEq  40 mEq Oral PRN Alexandra Embs, DPM        Or    potassium chloride 10 mEq/100 mL IVPB (Peripheral Line)  10 mEq IntraVENous PRN Alexandra Embs, DPM        insulin lispro (HUMALOG) injection vial 0-12 Units  0-12 Units SubCUTAneous TID WC Alexandra Embs, DPM   4 Units at 02/01/22 1704    insulin lispro (HUMALOG) injection vial 0-6 Units  0-6 Units SubCUTAneous Nightly Alexandra Embs, DPM   2 Units at 02/01/22 2034    glucose (GLUTOSE) 40 % oral gel 15 g  15 g Oral PRN Alexandra Embs, DPM   15 g at 01/26/22 0350    dextrose 50 % IV solution  12.5 g IntraVENous PRN Alexandra Embs, DPM        glucagon (rDNA) injection 1 mg  1 mg IntraMUSCular PRN Alexandra Embs, DPM        dextrose 5 % solution  100 mL/hr IntraVENous PRN Alexandra Embs, DPM           ASSESSMENT:     Active Problems:    Type 2 diabetes mellitus without complication, with long-term current use of insulin ([facility])    Right foot infection    Cellulitis and abscess of toe of right foot    Abscess of right foot    Bilateral cellulitis of lower leg related to related to  uncontrolled diabetes    Right foot ulcer, with fat layer exposed ([address]    Ulcer of left foot, with fat layer exposed ([address]    CRP elevated  Resolved Problems:    * No resolved hospital problems.  *      PLAN:     Right foot wound s/p I&D x 2  - Afebrile   - S/p R foot I&D   - Switched to Unasyn per ID  - Wound culture positive for MSSA, group A strep   - Podiatry on board   - ID on board - recommending possible repeat I&D  - Pain management, Roxicodone 5mg q4  - MRI concerning for possible abscess vs phlegmon, no osteomyelitis      UTI, resolved  - Culture positive for Klebsiella   - s/p Cefepime     DM2  - A1C 10   - Glucose 127  - POCT glucose   - Lantus 25U BID  - Medium dose sliding scale   - Hypoglycemia protocol      Bipolar disorder  - Continue Remeron 7.5mg PO nightly   - Effexor  mg PO daily   - Trazodone 150 mg PO nightly      Discussed care plan with nurse after getting her input.     Above plan discussed with the patient, who agreed to the above plan      DVT prophylaxis: Lovenox 40 mg SC  GI prophylaxis: Protonix 20 mg BID      Plan will be discussed with the attending, Dr. Zoltan Bryant MD  Family Medicine Resident  2/2/2022 2:08 PM            Please note that this chart was generated using voice recognition Dragon dictation software.   Although every effort was made to ensure the accuracy of this automated transcription, some errors in transcription may have occurred

## 2022-02-02 NOTE — PROGRESS NOTES
oxyCODONE, sodium chloride flush, sodium chloride, ondansetron **OR** ondansetron, polyethylene glycol, acetaminophen **OR** acetaminophen, potassium chloride **OR** potassium alternative oral replacement **OR** potassium chloride, glucose, dextrose, glucagon (rDNA), dextrose    Objective     Vitals:  No data found. Average, Min, and Max for last 24 hours Vitals:  TEMPERATURE:  Temp  Av.6 °F (37 °C)  Min: 98.6 °F (37 °C)  Max: 98.6 °F (37 °C)    RESPIRATIONS RANGE: No data recorded    PULSE RANGE: No data recorded    BLOOD PRESSURE RANGE:  No data recorded.  ; No data recorded. PULSE OXIMETRY RANGE: SpO2  Av %  Min: 100 %  Max: 100 %    I/O last 3 completed shifts: In: 979 [P.O.:775; I.V.:20]  Out: 1150 [Urine:1150]    CBC:  Recent Labs     2252 22  0335   WBC 10.2 8.4 9.3   HGB 7.4* 7.7* 7.7*   HCT 23.3* 24.8* 25.5*   * 427 479*   CRP 53.3*  --   --         BMP:  Recent Labs     2252 22  0542   * 138 133*   K 4.1 4.3 4.6    103 101   CO2 23 24 25   BUN 14 13 19   CREATININE 0.86 0.78 0.94*   GLUCOSE 146* 108* 161*   CALCIUM 8.4* 8.6 8.9        Coags:  No results for input(s): APTT, PROT, INR in the last 72 hours. Lab Results   Component Value Date    SEDRATE 61 (H) 2022     Recent Labs     22   CRP 53.3*       Lower Extremity Physical Exam:   Vascular: DP and PT pulses are palpable. CFT <3 seconds to all digits. Hair growth is absent to the level of the digits.        Neuro: Saph/sural/SP/DP/plantar sensation diminished to light touch.     Musculoskeletal: Muscle strength is 5/5 to all lower extremity muscle groups. Gross deformity is status post left 4th and 5th digit amputation.  Tenderness to palpation to previous entry point of injury on the right foot and hyperkeratotic tissue on the left foot.      Dermatologic:     Diffuse edema noted throughout the right lower extremity without associated increase in warmth. Right foot: Incision with significant maceration. Tamar-incision skin is soft. No fluctuance. No purulence or foul odor appreciated. No periwound erythema or any other signs of active infection. Mild serous drainage appreciated. Left foot: Full thickness ulcer #2 located lateral plantar left foot and measures approximately 1cmx 1cmx0.2cm. Base is fibrotic. Does not probe to bone, sinus track, or undermine. No fluctuance, crepitus, or induration. Interdigital maceration absent. Clinical Images:            Imaging:   MRI FOOT RIGHT WO CONTRAST   Preliminary Result   Soft tissue defect along the plantar and medial surface of the midfoot with   suspected irregular fluid collection measuring 4.4 x 1.2 x 1.8 cm, which may   represent an abscess or phlegmon. Subcutaneous edema may represent underlying cellulitis. No discrete evidence to suggest osteomyelitis. XR ABDOMEN (KUB) (SINGLE AP VIEW)   Final Result   Increased stool burden seen diffusely throughout the colon suggesting   clinical presentation of constipation. Phleboliths seen on both sides of the   pelvis. CT FOOT RIGHT W CONTRAST   Final Result   1. Shallow soft tissue ulceration plantar and medial to the 1st metatarsal   base with an underlying area of possible abscess versus phlegmon measuring   2.8 x 1.1 x 0.9 cm.   2. Extensive subcutaneous fat stranding compatible with cellulitis. No soft   tissue gas. 3. No evidence of osteomyelitis or other acute osseous abnormality. US RENAL COMPLETE   Final Result   Unremarkable renal ultrasound. MRI FOOT RIGHT WO CONTRAST   Final Result   Subcutaneous edema and swelling, most notably affecting the dorsum of the   foot, which may reflect underlying cellulitis. No discrete organized fluid   collection is seen within the limitations of a noncontrast study. No imaging features of acute osteomyelitis appreciated.          MRI FOOT LEFT WO CONTRAST   Final Result   Limited evaluation due to patient motion artifact on several sequences. Skin thickening and subcutaneous edema seen within the plantar soft tissues   of the foot at the level of the proximal metatarsals, which may reflect   underlying cellulitis. Within the limitations of a noncontrast study, no   discrete organized fluid collection is seen. 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 VL DUP LOWER EXTREMITY VENOUS BILATERAL   Final Result      XR FOOT RIGHT (MIN 3 VIEWS)   Final Result   Right foot: No radiopaque foreign body. No fracture. Moderate soft tissue   swelling within the anterior aspect of plantar aspect of the foot   predominantly underlying meta tarsal heads. Left foot: Changes related to prior partial amputation of 4th and 5th ray as   described. No acute fracture. No radiopaque foreign body. Moderate soft   tissue swelling and plantar aspect of the foot. No discrete soft tissue ulcer   is noted. XR FOOT LEFT (MIN 3 VIEWS)   Final Result   Right foot: No radiopaque foreign body. No fracture. Moderate soft tissue   swelling within the anterior aspect of plantar aspect of the foot   predominantly underlying meta tarsal heads. Left foot: Changes related to prior partial amputation of 4th and 5th ray as   described. No acute fracture. No radiopaque foreign body. Moderate soft   tissue swelling and plantar aspect of the foot. No discrete soft tissue ulcer   is noted. Cultures: Group A strep. anaerobe contaminated. Intra -op culture: rare GPC in pairs    Claribel Bland is a 49 y.o. female with   1. Diabetic foot ulcer to subcutaneous layer, bilateral feet  2. Cellulitis, bilateral feet  3. Superficial abscess, bilateral feet  4. Poorly uncontrolled DM2 with peripheral neuropathy.    5. S/p bedside I&D right foot    Active Problems:    Type 2 diabetes mellitus without complication, with long-term current use of insulin (HCC)    Right foot infection    Cellulitis and abscess of toe of right foot    Abscess of right foot    Bilateral cellulitis of lower leg related to related to  uncontrolled diabetes    Right foot ulcer, with fat layer exposed ([address]    Ulcer of left foot, with fat layer exposed ([address]    CRP elevated  Resolved Problems:    * No resolved hospital problems. *       Plan     · Patient examined and evaluated at bedside   · Treatment options discussed in detail with the patient. · Antibiotics per ID  · Medical management per primary. · Dressing applied to right foot: betadine, silvercell, 4x4 gauze, kerlix, ace. Dressing to be changes twice daily. · Based on labs and imaging; CRP is trending down, normal white count with CT and MRI of right foot showing that there is no abscess appreciated. · New MRI results (22/2/22): no evidence of osteomyelitis with subcutaneous edema may represent underlying cellulitis  · No surgical intervention planned as labs, imaging and last I&D had no purulence intraoperatively. Patient remained stable for discharge from podiatry perspective  · Weight bearing to heel of right foot. WBAT to left foot.   · Discussed with Dr. Berta Felipe, [place]   Podiatric Medicine & Surgery   2/2/2022 at 11:06 AM

## 2022-02-03 LAB
ABSOLUTE EOS #: 0.2 K/UL (ref 0–0.44)
ABSOLUTE IMMATURE GRANULOCYTE: 0.06 K/UL (ref 0–0.3)
ABSOLUTE LYMPH #: 2.69 K/UL (ref 1.1–3.7)
ABSOLUTE MONO #: 0.94 K/UL (ref 0.1–1.2)
ANION GAP SERPL CALCULATED.3IONS-SCNC: 9 MMOL/L (ref 9–17)
BASOPHILS # BLD: 1 % (ref 0–2)
BASOPHILS ABSOLUTE: 0.05 K/UL (ref 0–0.2)
BUN BLDV-MCNC: 20 MG/DL (ref 6–20)
BUN/CREAT BLD: ABNORMAL (ref 9–20)
CALCIUM SERPL-MCNC: 9.1 MG/DL (ref 8.6–10.4)
CHLORIDE BLD-SCNC: 102 MMOL/L (ref 98–107)
CO2: 25 MMOL/L (ref 20–31)
CREAT SERPL-MCNC: 0.83 MG/DL (ref 0.5–0.9)
DIFFERENTIAL TYPE: ABNORMAL
EOSINOPHILS RELATIVE PERCENT: 2 % (ref 1–4)
GFR AFRICAN AMERICAN: >60 ML/MIN
GFR NON-AFRICAN AMERICAN: >60 ML/MIN
GFR SERPL CREATININE-BSD FRML MDRD: ABNORMAL ML/MIN/{1.73_M2}
GFR SERPL CREATININE-BSD FRML MDRD: ABNORMAL ML/MIN/{1.73_M2}
GLUCOSE BLD-MCNC: 128 MG/DL (ref 65–105)
GLUCOSE BLD-MCNC: 191 MG/DL (ref 65–105)
GLUCOSE BLD-MCNC: 205 MG/DL (ref 70–99)
GLUCOSE BLD-MCNC: 270 MG/DL (ref 65–105)
GLUCOSE BLD-MCNC: 289 MG/DL (ref 65–105)
HCT VFR BLD CALC: 25.3 % (ref 36.3–47.1)
HEMOGLOBIN: 7.7 G/DL (ref 11.9–15.1)
IMMATURE GRANULOCYTES: 1 %
LYMPHOCYTES # BLD: 31 % (ref 24–43)
MCH RBC QN AUTO: 30.4 PG (ref 25.2–33.5)
MCHC RBC AUTO-ENTMCNC: 30.4 G/DL (ref 28.4–34.8)
MCV RBC AUTO: 100 FL (ref 82.6–102.9)
MONOCYTES # BLD: 11 % (ref 3–12)
NRBC AUTOMATED: 0 PER 100 WBC
PDW BLD-RTO: 13.9 % (ref 11.8–14.4)
PLATELET # BLD: 463 K/UL (ref 138–453)
PLATELET ESTIMATE: ABNORMAL
PMV BLD AUTO: 10 FL (ref 8.1–13.5)
POTASSIUM SERPL-SCNC: 4.5 MMOL/L (ref 3.7–5.3)
RBC # BLD: 2.53 M/UL (ref 3.95–5.11)
RBC # BLD: ABNORMAL 10*6/UL
SEG NEUTROPHILS: 54 % (ref 36–65)
SEGMENTED NEUTROPHILS ABSOLUTE COUNT: 4.7 K/UL (ref 1.5–8.1)
SODIUM BLD-SCNC: 136 MMOL/L (ref 135–144)
WBC # BLD: 8.6 K/UL (ref 3.5–11.3)
WBC # BLD: ABNORMAL 10*3/UL

## 2022-02-03 PROCEDURE — 6370000000 HC RX 637 (ALT 250 FOR IP): Performed by: PODIATRIST

## 2022-02-03 PROCEDURE — 6360000002 HC RX W HCPCS: Performed by: PODIATRIST

## 2022-02-03 PROCEDURE — 97535 SELF CARE MNGMENT TRAINING: CPT

## 2022-02-03 PROCEDURE — 6360000002 HC RX W HCPCS

## 2022-02-03 PROCEDURE — 6360000002 HC RX W HCPCS: Performed by: INTERNAL MEDICINE

## 2022-02-03 PROCEDURE — 94640 AIRWAY INHALATION TREATMENT: CPT

## 2022-02-03 PROCEDURE — 2580000003 HC RX 258: Performed by: PODIATRIST

## 2022-02-03 PROCEDURE — 94761 N-INVAS EAR/PLS OXIMETRY MLT: CPT

## 2022-02-03 PROCEDURE — 76937 US GUIDE VASCULAR ACCESS: CPT

## 2022-02-03 PROCEDURE — 99232 SBSQ HOSP IP/OBS MODERATE 35: CPT | Performed by: INTERNAL MEDICINE

## 2022-02-03 PROCEDURE — 99232 SBSQ HOSP IP/OBS MODERATE 35: CPT | Performed by: FAMILY MEDICINE

## 2022-02-03 PROCEDURE — 82947 ASSAY GLUCOSE BLOOD QUANT: CPT

## 2022-02-03 PROCEDURE — 6370000000 HC RX 637 (ALT 250 FOR IP)

## 2022-02-03 PROCEDURE — 85025 COMPLETE CBC W/AUTO DIFF WBC: CPT

## 2022-02-03 PROCEDURE — 36415 COLL VENOUS BLD VENIPUNCTURE: CPT

## 2022-02-03 PROCEDURE — 05HA33Z INSERTION OF INFUSION DEVICE INTO LEFT BRACHIAL VEIN, PERCUTANEOUS APPROACH: ICD-10-PCS | Performed by: INTERNAL MEDICINE

## 2022-02-03 PROCEDURE — 2580000003 HC RX 258: Performed by: INTERNAL MEDICINE

## 2022-02-03 PROCEDURE — 1200000000 HC SEMI PRIVATE

## 2022-02-03 PROCEDURE — 80048 BASIC METABOLIC PNL TOTAL CA: CPT

## 2022-02-03 PROCEDURE — C1751 CATH, INF, PER/CENT/MIDLINE: HCPCS

## 2022-02-03 PROCEDURE — 6370000000 HC RX 637 (ALT 250 FOR IP): Performed by: STUDENT IN AN ORGANIZED HEALTH CARE EDUCATION/TRAINING PROGRAM

## 2022-02-03 RX ORDER — OXYCODONE HYDROCHLORIDE 5 MG/1
5 TABLET ORAL EVERY 4 HOURS PRN
Qty: 30 TABLET | Refills: 0 | Status: SHIPPED | OUTPATIENT
Start: 2022-02-03 | End: 2022-02-06 | Stop reason: HOSPADM

## 2022-02-03 RX ORDER — FLUCONAZOLE 150 MG/1
150 TABLET ORAL DAILY
Qty: 1 TABLET | Refills: 0 | Status: SHIPPED | OUTPATIENT
Start: 2022-02-03 | End: 2022-02-06 | Stop reason: HOSPADM

## 2022-02-03 RX ORDER — KETOROLAC TROMETHAMINE 15 MG/ML
15 INJECTION, SOLUTION INTRAMUSCULAR; INTRAVENOUS ONCE
Status: COMPLETED | OUTPATIENT
Start: 2022-02-03 | End: 2022-02-03

## 2022-02-03 RX ADMIN — INSULIN LISPRO 2 UNITS: 100 INJECTION, SOLUTION INTRAVENOUS; SUBCUTANEOUS at 08:16

## 2022-02-03 RX ADMIN — INSULIN LISPRO 6 UNITS: 100 INJECTION, SOLUTION INTRAVENOUS; SUBCUTANEOUS at 12:54

## 2022-02-03 RX ADMIN — AMPICILLIN SODIUM AND SULBACTAM SODIUM 3000 MG: 2; 1 INJECTION, POWDER, FOR SOLUTION INTRAMUSCULAR; INTRAVENOUS at 18:15

## 2022-02-03 RX ADMIN — HYDROXYZINE HYDROCHLORIDE 10 MG: 10 TABLET ORAL at 16:11

## 2022-02-03 RX ADMIN — OXYCODONE HYDROCHLORIDE 5 MG: 5 TABLET ORAL at 23:55

## 2022-02-03 RX ADMIN — PREGABALIN 200 MG: 100 CAPSULE ORAL at 08:12

## 2022-02-03 RX ADMIN — PANTOPRAZOLE SODIUM 20 MG: 40 TABLET, DELAYED RELEASE ORAL at 06:17

## 2022-02-03 RX ADMIN — OXYCODONE HYDROCHLORIDE 5 MG: 5 TABLET ORAL at 06:16

## 2022-02-03 RX ADMIN — TRAZODONE HYDROCHLORIDE 150 MG: 100 TABLET ORAL at 21:22

## 2022-02-03 RX ADMIN — OXYCODONE HYDROCHLORIDE 5 MG: 5 TABLET ORAL at 19:55

## 2022-02-03 RX ADMIN — INSULIN GLARGINE 25 UNITS: 100 INJECTION, SOLUTION SUBCUTANEOUS at 21:30

## 2022-02-03 RX ADMIN — AMPICILLIN SODIUM AND SULBACTAM SODIUM 3000 MG: 2; 1 INJECTION, POWDER, FOR SOLUTION INTRAMUSCULAR; INTRAVENOUS at 06:16

## 2022-02-03 RX ADMIN — DICYCLOMINE HYDROCHLORIDE 10 MG: 10 CAPSULE ORAL at 12:54

## 2022-02-03 RX ADMIN — DICYCLOMINE HYDROCHLORIDE 10 MG: 10 CAPSULE ORAL at 21:22

## 2022-02-03 RX ADMIN — HYDROXYZINE HYDROCHLORIDE 10 MG: 10 TABLET ORAL at 00:50

## 2022-02-03 RX ADMIN — DICYCLOMINE HYDROCHLORIDE 10 MG: 10 CAPSULE ORAL at 16:12

## 2022-02-03 RX ADMIN — SODIUM CHLORIDE, PRESERVATIVE FREE 10 ML: 5 INJECTION INTRAVENOUS at 21:24

## 2022-02-03 RX ADMIN — PREGABALIN 200 MG: 100 CAPSULE ORAL at 21:23

## 2022-02-03 RX ADMIN — OXYCODONE HYDROCHLORIDE 5 MG: 5 TABLET ORAL at 00:50

## 2022-02-03 RX ADMIN — AMPICILLIN SODIUM AND SULBACTAM SODIUM 3000 MG: 2; 1 INJECTION, POWDER, FOR SOLUTION INTRAMUSCULAR; INTRAVENOUS at 23:55

## 2022-02-03 RX ADMIN — DICYCLOMINE HYDROCHLORIDE 10 MG: 10 CAPSULE ORAL at 08:14

## 2022-02-03 RX ADMIN — SODIUM CHLORIDE, PRESERVATIVE FREE 10 ML: 5 INJECTION INTRAVENOUS at 08:22

## 2022-02-03 RX ADMIN — AMPICILLIN SODIUM AND SULBACTAM SODIUM 3000 MG: 2; 1 INJECTION, POWDER, FOR SOLUTION INTRAMUSCULAR; INTRAVENOUS at 12:54

## 2022-02-03 RX ADMIN — BUDESONIDE AND FORMOTEROL FUMARATE DIHYDRATE 2 PUFF: 80; 4.5 AEROSOL RESPIRATORY (INHALATION) at 21:24

## 2022-02-03 RX ADMIN — INSULIN GLARGINE 25 UNITS: 100 INJECTION, SOLUTION SUBCUTANEOUS at 08:16

## 2022-02-03 RX ADMIN — FLUTICASONE PROPIONATE 1 SPRAY: 50 SPRAY, METERED NASAL at 08:16

## 2022-02-03 RX ADMIN — MIRTAZAPINE 7.5 MG: 15 TABLET, FILM COATED ORAL at 21:23

## 2022-02-03 RX ADMIN — VENLAFAXINE HYDROCHLORIDE 150 MG: 150 CAPSULE, EXTENDED RELEASE ORAL at 08:12

## 2022-02-03 RX ADMIN — OXYCODONE HYDROCHLORIDE 5 MG: 5 TABLET ORAL at 10:26

## 2022-02-03 RX ADMIN — BUDESONIDE AND FORMOTEROL FUMARATE DIHYDRATE 2 PUFF: 80; 4.5 AEROSOL RESPIRATORY (INHALATION) at 08:11

## 2022-02-03 RX ADMIN — KETOROLAC TROMETHAMINE 15 MG: 15 INJECTION, SOLUTION INTRAMUSCULAR; INTRAVENOUS at 22:35

## 2022-02-03 RX ADMIN — INSULIN LISPRO 6 UNITS: 100 INJECTION, SOLUTION INTRAVENOUS; SUBCUTANEOUS at 16:14

## 2022-02-03 RX ADMIN — AMPICILLIN SODIUM AND SULBACTAM SODIUM 3000 MG: 2; 1 INJECTION, POWDER, FOR SOLUTION INTRAMUSCULAR; INTRAVENOUS at 00:22

## 2022-02-03 RX ADMIN — FLUCONAZOLE 150 MG: 100 TABLET ORAL at 12:08

## 2022-02-03 ASSESSMENT — PAIN DESCRIPTION - LOCATION
LOCATION: FOOT
LOCATION: FOOT

## 2022-02-03 ASSESSMENT — PAIN SCALES - GENERAL
PAINLEVEL_OUTOF10: 10

## 2022-02-03 ASSESSMENT — PAIN DESCRIPTION - DESCRIPTORS
DESCRIPTORS: ACHING;DISCOMFORT
DESCRIPTORS: ACHING;DISCOMFORT;SORE

## 2022-02-03 ASSESSMENT — PAIN DESCRIPTION - ONSET: ONSET: ON-GOING

## 2022-02-03 ASSESSMENT — ENCOUNTER SYMPTOMS
SHORTNESS OF BREATH: 0
COUGH: 0
APNEA: 0
PHOTOPHOBIA: 0
EYE REDNESS: 0
COLOR CHANGE: 1
EYE DISCHARGE: 0
EYE PAIN: 0
ABDOMINAL DISTENTION: 0

## 2022-02-03 ASSESSMENT — PAIN DESCRIPTION - PAIN TYPE
TYPE: ACUTE PAIN
TYPE: ACUTE PAIN

## 2022-02-03 ASSESSMENT — PAIN DESCRIPTION - PROGRESSION: CLINICAL_PROGRESSION: NOT CHANGED

## 2022-02-03 ASSESSMENT — PAIN DESCRIPTION - ORIENTATION
ORIENTATION: RIGHT;LEFT
ORIENTATION: RIGHT;LEFT

## 2022-02-03 ASSESSMENT — PAIN DESCRIPTION - FREQUENCY
FREQUENCY: CONTINUOUS
FREQUENCY: CONTINUOUS

## 2022-02-03 ASSESSMENT — PAIN - FUNCTIONAL ASSESSMENT: PAIN_FUNCTIONAL_ASSESSMENT: PREVENTS OR INTERFERES SOME ACTIVE ACTIVITIES AND ADLS

## 2022-02-03 NOTE — PROCEDURES
Product type Bard 4 fr single lumen PowerMidline  History/Labs/Allergies Reviewed  Placed By: Mich Amezcua - RN IV Team  Assisted By: Rhesa Fleischer. Eloy  Time out Performed using Two Identifiers  Lot # [id number]  Expiration date 2023/02/28  Catheter size 4 Setswana  Trimmed at 13 cm  Total length inserted 11cm  External catheter length 2cm  Location Left brachial vein  Number of attempts 1  Estimated blood loss 1 ml  Placement verified by- positive blood return & flushes easily  Special equipment used- ultrasound & micro-introducer technique   Catheter secured with statlock  Dressing applied- Tegaderm CHG  Lidocaine administered intradermally conc.1% 2 mL    Midline education:     [ x ] Discussed with patient/Family or POA prior to procedure. Risks and Benefits along with reason for procedure were discussed and teaching was reinforced with an education handout on Midline insertion. [facility] FAQ Catheter Associated Blood Stream Infections and [address] [address] REV. 7/13 Nursing and Booklet left at bedside or in chart. Patient (Family or POA) acknowledged understanding of information taught and agreed to procedure. [  ] Was not discussed with patient/family or POA due to pts medical status at time of procedure. pts family or POA not available to discuss Midline education.  [facility] FAQ Catheter Associated Blood Stream Infections and [address] [address] REV. 7/13 Nursing and Booklet left at bedside or in chart

## 2022-02-03 NOTE — PROGRESS NOTES
f  Progress Note  Podiatric Medicine and Surgery     Subjective     CC: right foot pain    Patient seen and examined at bedside. S/p I&D right foot (DOS:1/23/22)   No acute events overnight. Pt states her tenderness has improved and decrease in pain    HPI :  Henrietta Quiroz is a 49 y.o. female seen at [facility] for right foot pain. Patient stepped on a piece of glass a few days ago. She went to her PCP when PCP removed the glass. She presented to the ED last night due to increased pain and swelling to the right lower extremity. Upon arrival, X-ray was taken which showed no foreign body, acute osseous deformity or soft tissue emphysema. During my evaluation, patient also mentioned pain on her plantar left foot and  pain is not as bad as her right foot. Per chart review, patient visited [address] [place] [address] podiatry clinic a year ago for diabetic foot care but never follows up after that. 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 Her last A1C in 12/13/2021 is 13.3%. She admits neuropathic pain to bilateral lower extremity. She denies other pedal complaints. ROS: Denies N/V/F/C/SOB/CP. Otherwise negative except at stated in the HPI.      Medications:  Scheduled Meds:   fluconazole  150 mg Oral Daily    ampicillin-sulbactam  3,000 mg IntraVENous Q6H    insulin glargine  25 Units SubCUTAneous BID    enoxaparin  30 mg SubCUTAneous BID    budesonide-formoterol  2 puff Inhalation BID    dicyclomine  10 mg Oral 4x Daily    fluticasone  1 spray Each Nostril Daily    mirtazapine  7.5 mg Oral Nightly    pantoprazole  20 mg Oral BID AC    pregabalin  200 mg Oral BID    traZODone  150 mg Oral Nightly    venlafaxine  150 mg Oral Daily with breakfast    sodium chloride flush  5-40 mL IntraVENous 2 times per day    insulin lispro  0-12 Units SubCUTAneous TID WC    insulin lispro  0-6 Units SubCUTAneous Nightly       Continuous Infusions:   sodium chloride      dextrose         PRN Meds:hydrOXYzine, simethicone, albuterol sulfate HFA, oxyCODONE, sodium chloride flush, sodium chloride, ondansetron **OR** ondansetron, polyethylene glycol, acetaminophen **OR** acetaminophen, potassium chloride **OR** potassium alternative oral replacement **OR** potassium chloride, glucose, dextrose, glucagon (rDNA), dextrose    Objective     Vitals:  Patient Vitals for the past 8 hrs:   BP Temp Temp src Pulse Resp SpO2   22 0801 116/75 98 °F (36.7 °C) Axillary 84 16 98 %     Average, Min, and Max for last 24 hours Vitals:  TEMPERATURE:  Temp  Av.2 °F (36.8 °C)  Min: 98 °F (36.7 °C)  Max: 98.3 °F (36.8 °C)    RESPIRATIONS RANGE: Resp  Av  Min: 16  Max: 18    PULSE RANGE: Pulse  Av  Min: 80  Max: 84    BLOOD PRESSURE RANGE:  Systolic (31QFD), RPX:260 , Min:116 , GS   ; Diastolic (18QTG), XI, Min:75, Max:92      PULSE OXIMETRY RANGE: SpO2  Av %  Min: 98 %  Max: 98 %    I/O last 3 completed shifts: In: 450 [P.O.:450]  Out: 650 [Urine:650]    CBC:  Recent Labs     22  0552 22  0335 22  0504   WBC 8.4 9.3 8.6   HGB 7.7* 7.7* 7.7*   HCT 24.8* 25.5* 25.3*    479* 463*        BMP:  Recent Labs     22  0552 22  0542 22  0504    133* 136   K 4.3 4.6 4.5    101 102   CO2 24 25 25   BUN 13 19 20   CREATININE 0.78 0.94* 0.83   GLUCOSE 108* 161* 205*   CALCIUM 8.6 8.9 9.1        Coags:  No results for input(s): APTT, PROT, INR in the last 72 hours. Lab Results   Component Value Date    SEDRATE 61 (H) 2022     No results for input(s): CRP in the last 72 hours. Lower Extremity Physical Exam:   Vascular: DP and PT pulses are palpable. CFT <3 seconds to all digits. Hair growth is absent to the level of the digits.        Neuro: Saph/sural/SP/DP/plantar sensation diminished to light touch.     Musculoskeletal: Muscle strength is 5/5 to all lower extremity muscle groups. Gross deformity is status post left 4th and 5th digit amputation.  Tenderness to palpation to previous entry point of injury on the right foot and hyperkeratotic tissue on the left foot.      Dermatologic:     Diffuse edema noted throughout the right lower extremity without associated increase in warmth. Right foot: Incision with significant maceration. Tamar-incision skin is soft. No fluctuance. No purulence or foul odor appreciated. No periwound erythema or any other signs of active infection. Mild serous drainage appreciated. Left foot: Full thickness ulcer #2 located lateral plantar left foot and measures approximately 1cmx 1cmx0.2cm. Base is fibrotic. Does not probe to bone, sinus track, or undermine. No fluctuance, crepitus, or induration. Interdigital maceration absent. Clinical Images:            Imaging:   MRI FOOT RIGHT WO CONTRAST   Final Result   Soft tissue defect along the plantar and medial surface of the midfoot with   suspected irregular fluid collection measuring 4.4 x 1.2 x 1.8 cm, which may   represent an abscess or phlegmon. Subcutaneous edema may represent underlying cellulitis. No discrete evidence to suggest osteomyelitis. XR ABDOMEN (KUB) (SINGLE AP VIEW)   Final Result   Increased stool burden seen diffusely throughout the colon suggesting   clinical presentation of constipation. Phleboliths seen on both sides of the   pelvis. CT FOOT RIGHT W CONTRAST   Final Result   1. Shallow soft tissue ulceration plantar and medial to the 1st metatarsal   base with an underlying area of possible abscess versus phlegmon measuring   2.8 x 1.1 x 0.9 cm.   2. Extensive subcutaneous fat stranding compatible with cellulitis. No soft   tissue gas. 3. No evidence of osteomyelitis or other acute osseous abnormality. US RENAL COMPLETE   Final Result   Unremarkable renal ultrasound.          MRI FOOT RIGHT WO CONTRAST   Final Result   Subcutaneous edema and swelling, most notably affecting the dorsum of the   foot, which may reflect underlying cellulitis. No discrete organized fluid   collection is seen within the limitations of a noncontrast study. No imaging features of acute osteomyelitis appreciated. MRI FOOT LEFT WO CONTRAST   Final Result   Limited evaluation due to patient motion artifact on several sequences. Skin thickening and subcutaneous edema seen within the plantar soft tissues   of the foot at the level of the proximal metatarsals, which may reflect   underlying cellulitis. Within the limitations of a noncontrast study, no   discrete organized fluid collection is seen. 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 VL DUP LOWER EXTREMITY VENOUS BILATERAL   Final Result      XR FOOT RIGHT (MIN 3 VIEWS)   Final Result   Right foot: No radiopaque foreign body. No fracture. Moderate soft tissue   swelling within the anterior aspect of plantar aspect of the foot   predominantly underlying meta tarsal heads. Left foot: Changes related to prior partial amputation of 4th and 5th ray as   described. No acute fracture. No radiopaque foreign body. Moderate soft   tissue swelling and plantar aspect of the foot. No discrete soft tissue ulcer   is noted. XR FOOT LEFT (MIN 3 VIEWS)   Final Result   Right foot: No radiopaque foreign body. No fracture. Moderate soft tissue   swelling within the anterior aspect of plantar aspect of the foot   predominantly underlying meta tarsal heads. Left foot: Changes related to prior partial amputation of 4th and 5th ray as   described. No acute fracture. No radiopaque foreign body. Moderate soft   tissue swelling and plantar aspect of the foot. No discrete soft tissue ulcer   is noted. Cultures: Group A strep. anaerobe contaminated. Intra -op culture: rare GPC in pairs    Jorge Dys is a 49 y.o. female with   1.  Diabetic foot ulcer to subcutaneous layer, bilateral feet  2. Cellulitis, bilateral feet  3. Superficial abscess, bilateral feet  4. Poorly uncontrolled DM2 with peripheral neuropathy. 5. S/p bedside I&D right foot    Active Problems:    Type 2 diabetes mellitus without complication, with long-term current use of insulin (HCC)    Right foot infection    Cellulitis and abscess of toe of right foot    Abscess of right foot    Bilateral cellulitis of lower leg related to related to  uncontrolled diabetes    Right foot ulcer, with fat layer exposed ([address]    Ulcer of left foot, with fat layer exposed ([address]    CRP elevated  Resolved Problems:    * No resolved hospital problems. *       Plan     · Patient examined and evaluated at bedside   · Treatment options discussed in detail with the patient. · Antibiotics per ID: Unasyn. F/u with ID in 2 weeks outpatients  · Medical management per primary. · Dressing applied to right foot: betadine, silvercell, 4x4 gauze, kerlix, ace. Dressing to be changes twice daily. · Based on labs and imaging; CRP is trending down, normal white count with CT and MRI of right foot showing that there is no abscess appreciated. · New MRI results (22/2/22): no evidence of osteomyelitis with subcutaneous edema may represent underlying cellulitis  · Spoke to radiology today and they explained that the MRI results show phlegmon on MRI instead of abscess. · No surgical intervention planned as labs, imaging and last I&D had no purulence intraoperatively. Patient remained stable for discharge from podiatry perspective  · Weight bearing to heel of right foot. WBAT to left foot. · Discussed with Dr. Tremaine Rush DPM   Podiatric Medicine & Surgery   2/3/2022 at 12:10 PM     Senior Resident Statement:  I have discussed the case, including pertinent history and exam findings with the resident. I agree with the assessment, plan and orders as documented above.        Electronically signed by Ami Peacock DPM on 2/3/2022 at 5:52 PM

## 2022-02-03 NOTE — PROGRESS NOTES
Occupational Therapy  Facility/Department: [address] ORTHO/MED SURG  Daily Treatment Note  NAME: Tania Crocker  : 1965  MRN: [id number]    Date of Service: 2/3/2022    Discharge Recommendations:  Patient would benefit from continued therapy after discharge       Assessment   Performance deficits / Impairments: Decreased functional mobility ; Decreased ADL status; Decreased endurance;Decreased high-level IADLs;Decreased safe awareness;Decreased balance;Decreased strength;Decreased cognition  Prognosis: Good  Patient Education: OT POC, transfer/walker safety, importance of participation in therapy, weight bearing status - pt verbalized understanding. REQUIRES OT FOLLOW UP: Yes  Activity Tolerance  Activity Tolerance: Patient Tolerated treatment well  Safety Devices  Safety Devices in place: Yes  Type of devices: Call light within reach; Chair alarm in place;Gait belt;Patient at risk for falls; Left in chair;Nurse notified         Patient Diagnosis(es): The primary encounter diagnosis was Right foot infection. Diagnoses of Elevated d-dimer and Ulcer of left foot, with fat layer exposed ([address] were also pertinent to this visit.       has a past medical history of Acid reflux, Acute cystitis with hematuria, Acute non-recurrent maxillary sinusitis, Asthma, Bipolar 1 disorder ([address] Bipolar disorder, mixed ([address] BMI 34.0-34.9,adult, Cannabis use disorder, severe, dependence ([address] Cerebrovascular accident (CVA) ([address] Chest pain, Chronic renal insufficiency, Cocaine abuse ([address] COVID-19 virus RNA test result unknown, DDD (degenerative disc disease), cervical, Diabetes mellitus ([address] Dizziness, Fibromyalgia, History of stroke, Homicidal ideation, Hyperglycemia, Hypertension, Hypotension, IDDM (insulin dependent diabetes mellitus), Lupus ([address] Migraine, Neuropathy, Neuropathy, Polysubstance abuse ([address] Post traumatic stress disorder (PTSD), Posttraumatic stress disorder, Recurrent depression ([address] Recurrent major depressive disorder, in partial remission ([facility] Screening mammogram, encounter for, Severe recurrent major depression with psychotic features ([facility] Severe recurrent major depression without psychotic features ([facility] Stroke (cerebrum) ([facility] Stroke ([facility] Suicidal ideation, Suicidal intent, Vitamin D deficiency, and White matter changes. has a past surgical history that includes Abscess Drainage; chest tube insertion; Abdomen surgery; Cataract removal with implant (Bilateral); LASIK (Bilateral); Finger amputation (Left, 03/13/2021); Hand surgery (Right, 09/14/2021); Hand surgery (Right, 09/15/2021); Finger amputation (Right, 10/11/2021); Foot surgery (Right, 01/27/2022); and Foot Debridement (Right, 1/27/2022). Restrictions  Restrictions/Precautions  Restrictions/Precautions: Weight Bearing,Up as Tolerated,Fall Risk  Required Braces or Orthoses?: Yes  Lower Extremity Weight Bearing Restrictions  Right Lower Extremity Weight Bearing: Non Weight Bearing  Partial Weight Bearing Percentage Or Pounds: WB to R heel only  Left Lower Extremity Weight Bearing: Weight Bearing As Tolerated  Upper Extremity Weight Bearing Restrictions  Other: Non weight bearing to right foot. WBAT to left foot with Sx shoe per podiatry note 1/28/22  Required Braces or Orthoses  Right Lower Extremity Brace:  (\"Foot/ankle post surgical boot\" Please apply to b/l feet for weight bearing)  Left Lower Extremity Brace:  (sx shoe)  Position Activity Restriction  Other position/activity restrictions: Weight bearing to heel of right foot. WBAT to left foot  Subjective   General  Patient assessed for rehabilitation services?: Yes  Family / Caregiver Present: No  General Comment  Pain Assessment  Pain Assessment: 0-10  Pain Level: 10 (Pt states pain in B feet. Therapist explained to pt 10 is highest number for pain.  Pt displays no outward signs of high pain such as crying out or facial expressions.)  Pain Type: Acute pain  Pain Location: Foot  Pain Orientation: Right;Left  Pain Descriptors: Aching;Discomfort; Sore  Pain Frequency: Continuous  Non-Pharmaceutical Pain Intervention(s): Distraction;Elevation;Repositioned;Rest;Therapeutic presence  Vital Signs  Patient Currently in Pain: Yes   Orientation  Orientation  Overall Orientation Status: Within Functional Limits  Objective    ADL  Feeding: Independent  Grooming: Setup; Independent (Oral care seated in chair.)  UE Bathing: Setup; Increased time to complete; Independent (Mod A for back seated in chair d/t limited reach.)  UE Dressing: Setup;Contact guard assistance (To manage gown.)  LE Dressing: Moderate assistance;Setup (Don sx shoe LLE.)  Toileting: Setup; Increased time to complete;Contact guard assistance (Pericare/bottom care standing at chair.)  Additional Comments: Pt completed supine/sit transfer to seated EOB. Sit/stand transfer using RW for stand/pivot transfer EOB/chair. Pt completed ADL care seated in chair. Pt easily distracted. Balance  Sitting Balance: Supervision (Seated EOB/chair.)  Standing Balance: Contact guard assistance  Standing Balance  Time: 2 minutes; 4 minutes. Activity: Static standing EOB using RW, stand/pivot transfer EOB/chair, pericare/bottom care standing at chair. Comment: Pt positioned R foot on seat of chair during pericare/bottom care in standing. Functional Mobility  Functional - Mobility Device: Rolling Walker  Activity: Other  Assist Level: Contact guard assistance  Functional Mobility Comments: Multiple verbal cues for walker placement, pt positioning walker too far out in front, with good return. Pt able to maintain NWB to RLE by resting R foot onto top of L foot during stand/pivot transfer. Bed mobility  Supine to Sit: Stand by assistance  Scooting: Stand by assistance  Transfers  Sit to stand: Minimal assistance  Stand to sit: Minimal assistance  Transfer Comments: Transfers completed x2. Verbal cues for hand placement with fair return.  Pt impulsive quickly dropping into chair during stand/sit transfer. Pt grasped arms of chair properly for support during second stand/sit transfer. Cognition  Overall Cognitive Status: Exceptions  Safety Judgement: Decreased awareness of need for assistance;Decreased awareness of need for safety  Insights: Decreased awareness of deficits  Plan   Plan  Times per week: 3-4x/wk  Current Treatment Recommendations: Strengthening,Endurance Training,Equipment Evaluation, Education, & procurement,Self-Care / ADL,Patient/Caregiver Education & Training,Home Management Training,Safety Education & Training,Functional Mobility Training,Balance Training,Cognitive Reorientation  AM-PAC Score        AM-[facility] Inpatient Daily Activity Raw Score: 21 (02/03/22 1212)  AM-PAC Inpatient ADL T-Scale Score : 44.27 (02/03/22 1212)  ADL Inpatient CMS 0-100% Score: 32.79 (02/03/22 1212)  ADL Inpatient CMS G-Code Modifier : Sofía Easton (02/03/22 1212)    Goals  Short term goals  Time Frame for Short term goals: Patient will, by discharge  Short term goal 1: demo LB ADLs at Mod I using AE PRN  Short term goal 2: demo toileting tasks at Supervision using AE PRN  Short term goal 3: demo functional transfers/mobility using LRD at SBA to engage in ADLs  Short term goal 4: demo 8+ min of dynamic standing tolerance with uni/bilateral hand release at SBA to engage in ADLs       Therapy Time   Individual Concurrent Group Co-treatment   Time In 1038         Time Out 1147         Minutes 69         Timed Code Treatment Minutes: 55 Minutes     Pt supine in bed upon therapist arrival. Pleasant and agreeable to therapy. See above for LOF for all tasks. Pt retired to seated in chair at end of session with chair alarm activated and call light within reach.     LUCAS Reed

## 2022-02-03 NOTE — PROGRESS NOTES
[facility] -   Infectious diseases evaluation  admission date 1/22/2022    reason for consultation:   Diabetic foot infection    Impression :   Current:  · Right diabetic foot infection, GAS  · Cellulitis both feet  · bandemia  · Bacteriuria vs UTI kleb S ancef  · CRP elevation    Other:  ·   Discussion / summary of stay / plan of care   ·   Recommendations   · foot cx MSSA R clinda  and GAS- finegoldia and aerococcus  · On  Ancef-2/1 switch to Unasyn   · MRI suggestive of a midfoot collection - podiatry feel it is a phlegmon  - tenderness better 2/3  · FU office in 2 weeks - DC on invanz 1 g daily x 2 weeks w office FU - might need po Augmentin afterwards      · Recent UC 1/24 : Klebsiella- no dysuria - but lower abd pain - on keflex till 1/29 completed    Infection Control Recommendations   · Ryde Precautions  · Contact Isolation       Antimicrobial Stewardship Recommendations   · Simplification of therapy  · Targeted therapy  Coordination ofOutpatient Care:   · Estimated Length of IV antimicrobials:  · Patient will need Midline / picc Catheter Insertion:   · Patient will need SNF:  · Patient will need outpatient wound care:     History of Present Illness:   Initial history:  Marilee Corral is a 49y.o.-year-old female with diabetes very well-known to my service, stepped on a piece of glass a few days prior to admission presented with swelling and drainage from right foot, culture came back with group A strep,  Patient has neuropathy from diabetes  WBC elevated 14,  sed [id number],     Podiatry debrided the superficial ulcer and felt it was not too deep yet there was cellulitis of both feet.   Hemoglobin A1c was 10    MRI of the left foot no osteomyelitis  MRI of the right foot no osteomyelitis    Leukocytosis responded to antibiotics, infectious consulted for antibiotic management  Patient is on cefepime and clindamycin    R foot very swollen and tender to light touch - suspect deeper involvement and needs more I/D    1/26  Pt is afebrile and stable this morning. WBC trending up (14 today). UC 1/24 growing Klebsiella. CT right leg 1/25 shows an abscess  Planned for I/D on 1/27  Pain still ++    1/27  Pt seen and examined at bedside, afebrile and hypotensive this morning. Endorsed a lot of pain overnight. WBC improved to 11   To go to OR today for I+D of R foot abscess. Started on Keflex until 1/29 for UTI  Will monitor on antibiotics and await podiatry recs    1/28  Pt afebrile and stable s/p I+D right foot yesterday. Pt c/o pain this morning but is managing. Pt also endorsing abdominal discomfort. XR Abd showing increased stool burden. WBC did increase to 15 from 11 yesterday. Wound Cx 1/27: pending but GPC in pairs seen. Will monitor on Keflex and Clinda    1/29  No fever  Lab: MSSA in wound , but clinda is R  Switching to ceftriaxone  Foot better - less edema and drainage -tender still    1/30  Right foot  but discharge is minimal from it. Otherwise she has no abdominal pain or diarrhea CRP is improved 54    1/31  Alert appropriate, right foot continues to be swollen very tender to touch and it is painful. The wound is not draining much though. No redness noticed. Podiatry watching at this point. Continue antibiotic  WBC is 10 and CRP continues to be 53 has not improved in today    2/1  Right foot remains tender to light touch over the sole and over the first metatarsal  Edema slightly better over the ankle and the dorsum but continues to be present over the shoulder without any drainage  Discussed with Dr. Castillo Godoy. , he will wait another few days and allow the antibiotics to work before deciding further procedure  Cultures are now growing Aerococcus and Mechelle via, on top of the MSSA and the group A strep    2/2  Foot remains very tender around the hallux and the small area, same areas.   Not improved  MRI suggestive of a collection that could be an abscess    2/3  Foot tenderness improved   Per podiatry this is a phlegmon on MRI and not an ABSCESS  Pt agreed to rehab - x few weeks  Will DC on invanz 2-3 weeks and FU office and anticipate po AB at some point -  Also FU w podiatry office to FU on progress of the foot      Interval changes  2/3/2022     /75   Pulse 84   Temp 98 °F (36.7 °C) (Axillary)   Resp 16   Ht 5' 6\" (1.676 m)   Wt 242 lb 8 oz (110 kg)   LMP  (LMP Unknown)   SpO2 98%   BMI 39.14 kg/m²    Summary of relevant labs:  Labs:  W14 > 11>15-10  [id number]  -54-53  Micro:  cx foot pus GAS  Uc 1/24: Klebsiella  Wound Cx 1/27: GPC pairs - cx pend    Imaging:  MRI of the right foot 2/2  Soft tissue defect along the plantar and medial surface of the midfoot with   suspected irregular fluid collection measuring 4.4 x 1.2 x 1.8 cm, which may   represent an abscess or phlegmon. XR abd 1/27: Increased stool burden seen diffusely throughout the colon suggesting   clinical presentation of constipation.  Phleboliths seen on both sides of the   pelvis. CT R foot 1/25:  1. Shallow soft tissue ulceration plantar and medial to the 1st metatarsal   base with an underlying area of possible abscess versus phlegmon measuring   2.8 x 1.1 x 0.9 cm.   2. Extensive subcutaneous fat stranding compatible with cellulitis.  No soft   tissue gas. 3. No evidence of osteomyelitis or other acute osseous abnormality. U/S renal 1/25: Unremarkable    MRI R foot 1/23:  Subcutaneous edema and swelling, most notably affecting the dorsum of the   foot, which may reflect underlying cellulitis.  No discrete organized fluid   collection is seen within the limitations of a noncontrast study.       No imaging features of acute osteomyelitis appreciated.        MRI L foot: 1/23:   Limited evaluation due to patient motion artifact on several sequences.       Skin thickening and subcutaneous edema seen within the plantar soft tissues   of the foot at the level of the proximal metatarsals, which may reflect   underlying cellulitis.  Within the limitations of a noncontrast study, no   discrete organized fluid collection is seen.       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 I have personally reviewed the past medical history, past surgical history, medications, social history, and family history, and I haveupdated the database accordingly. Allergies:   Bactrim [sulfamethoxazole-trimethoprim] and Adhesive tape     Review of Systems:     Review of Systems   Constitutional: Negative for activity change, appetite change, chills and diaphoresis. HENT: Negative for congestion. Eyes: Negative for photophobia, pain, discharge and redness. Respiratory: Negative for apnea, cough and shortness of breath. Cardiovascular: Negative for chest pain. Gastrointestinal: Negative for abdominal distention. Endocrine: Negative for cold intolerance, polydipsia and polyphagia. Genitourinary: Negative for dysuria and flank pain. Musculoskeletal: Negative for arthralgias. Skin: Positive for color change and wound. Allergic/Immunologic: Negative for environmental allergies, food allergies and immunocompromised state. Neurological: Negative for dizziness, tremors, seizures, syncope, light-headedness, numbness and headaches. Hematological: Negative for adenopathy. Psychiatric/Behavioral: Negative for agitation. Physical Examination :       Physical Exam  Constitutional:       General: She is not in acute distress. Appearance: Normal appearance. She is not ill-appearing, toxic-appearing or diaphoretic. HENT:      Head: Normocephalic and atraumatic. Nose: Nose normal. No congestion or rhinorrhea. Eyes:      General: No scleral icterus. Pupils: Pupils are equal, round, and reactive to light.    Cardiovascular:      Rate and Rhythm: Normal rate and regular rhythm. Heart sounds: Normal heart sounds. No murmur heard. Pulmonary:      Effort: No respiratory distress. Breath sounds: Normal breath sounds. No stridor. No wheezing. Abdominal:      General: There is no distension. Palpations: Abdomen is soft. There is no mass. Tenderness: There is no abdominal tenderness. Hernia: No hernia is present. Genitourinary:     Comments: Urine fatuma  Musculoskeletal:         General: Swelling present. No tenderness, deformity or signs of injury. Cervical back: Neck supple. No rigidity or tenderness. Skin:     General: Skin is dry. Coloration: Skin is not jaundiced or pale. Findings: Erythema present. No bruising. Neurological:      Mental Status: She is alert and oriented to person, place, and time. Mental status is at baseline. Cranial Nerves: No cranial nerve deficit. Motor: No weakness. Psychiatric:         Mood and Affect: Mood normal.         Thought Content:  Thought content normal.         Past Medical History:     Past Medical History:   Diagnosis Date    Acid reflux 5/29/2017    Acute cystitis with hematuria 10/11/2016    Acute non-recurrent maxillary sinusitis 11/28/2017    Asthma     Bipolar 1 disorder ([address] 1/4/2018    Bipolar disorder, mixed ([address]     BMI 34.0-34.9,adult 11/28/2017    Cannabis use disorder, severe, dependence ([address] 12/19/2017    Cerebrovascular accident (CVA) ([address] 6/14/2017    Chest pain 11/5/2016    Chronic renal insufficiency     Cocaine abuse ([address] 1/5/2018    COVID-19 virus RNA test result unknown     DDD (degenerative disc disease), cervical     Diabetes mellitus ([address]     Dizziness 6/13/2017    Fibromyalgia     History of stroke 9/9/2017    Homicidal ideation 11/6/2017    Hyperglycemia     Hypertension     Hypotension 1/18/2019    IDDM (insulin dependent diabetes mellitus) 12/21/2015    Lupus (HCC)     Migraine     Neuropathy     Neuropathy     Polysubstance abuse ([facility] 9/17/2017    Post traumatic stress disorder (PTSD)     Posttraumatic stress disorder 5/29/2017    Recurrent depression ([address] 5/29/2017    Recurrent major depressive disorder, in partial remission ([address] 11/28/2017    Screening mammogram, encounter for 11/28/2017    Severe recurrent major depression with psychotic features ([address] 12/19/2017    Severe recurrent major depression without psychotic features ([facility] 12/19/2017    Stroke (cerebrum) ([facility] 6/14/2017    Stroke ([facility]     per chart notes    Suicidal ideation     Suicidal intent 3/10/2017    Vitamin D deficiency 11/28/2017    White matter changes 6/13/2017       Past Surgical  History:     Past Surgical History:   Procedure Laterality Date    ABDOMEN SURGERY      drain tube    ABSCESS DRAINAGE      right buttock    CATARACT REMOVAL WITH IMPLANT Bilateral     CHEST TUBE INSERTION      FINGER AMPUTATION Left 03/13/2021    LEFT RING FINER AMPUTATION performed by Amaris Richards MD at [facility] Right 10/11/2021    AMPUTATION RIGHT INDEX FINGER performed by Amaris Richards MD at [address] [place] [address] Right 1/27/2022    FOOT ABSCESS INCISION AND DRAINAGE  (PULSE LAVAGE, CULTURE SWABS) performed by Nida Singer DPM at [place] [address]. Right 01/27/2022    FOOT ABSCESS INCISION AND DRAINAGE (PULSE LAVAGE, CULTURE SWABS) - Right    HAND SURGERY Right 09/14/2021    I&D INDEX FINGER performed by Amaris Richards MD at [address] [place] [address] Right 09/15/2021    I&D INDEX FINGER performed by Amaris Richards MD at [address] [place] [address] Bilateral        Medications:      fluconazole  150 mg Oral Daily    ampicillin-sulbactam  3,000 mg IntraVENous Q6H    insulin glargine  25 Units SubCUTAneous BID    enoxaparin  30 mg SubCUTAneous BID    budesonide-formoterol  2 puff Inhalation BID    dicyclomine  10 mg Oral 4x Daily    fluticasone  1 spray Each Nostril Daily    mirtazapine  7.5 mg Oral Nightly    pantoprazole  20 mg Oral BID AC    pregabalin  200 mg Oral BID    traZODone  150 mg Oral Nightly    venlafaxine  150 mg Oral Daily with breakfast    sodium chloride flush  5-40 mL IntraVENous 2 times per day    insulin lispro  0-12 Units SubCUTAneous TID WC    insulin lispro  0-6 Units SubCUTAneous Nightly       Social History:     Social History     Socioeconomic History    Marital status: Legally      Spouse name: Not on file    Number of children: Not on file    Years of education: Not on file    Highest education level: Not on file   Occupational History    Not on file   Tobacco Use    Smoking status: Never Smoker    Smokeless tobacco: Never Used   Vaping Use    Vaping Use: Never used   Substance and Sexual Activity    Alcohol use: Not Currently    Drug use: Yes     Types: Marijuana (Juan Bark), Cocaine     Comment: + Cocaine 2/2021 also, see Care Everywhere UDS    Sexual activity: Not Currently     Partners: Male   Other Topics Concern    Not on file   Social History Narrative    Not on file     Social Determinants of Health     Financial Resource Strain: High Risk    Difficulty of Paying Living Expenses: Hard   Food Insecurity: Food Insecurity Present    Worried About Running Out of Food in the Last Year: Often true    Kelin of Food in the Last Year: Often true   Transportation Needs: Unmet Transportation Needs    Lack of Transportation (Medical):  Yes    Lack of Transportation (Non-Medical): Yes   Physical Activity: Inactive    Days of Exercise per Week: 0 days    Minutes of Exercise per Session: 0 min   Stress: Stress Concern Present    Feeling of Stress : Rather much   Social Connections:     Frequency of Communication with Friends and Family: Not on file    Frequency of Social Gatherings with Friends and Family: Not on file    Attends Oriental orthodox Services: Not on file    Active Member of Clubs or Organizations: Not on file    Attends Club or Organization Meetings: Not on file    Marital Status: Not on file   Intimate Partner Violence:     Fear of Current or Ex-Partner: Not on file    Emotionally Abused: Not on file    Physically Abused: Not on file    Sexually Abused: Not on file   Housing Stability: High Risk    Unable to Pay for Housing in the Last Year: Yes    Number of Places Lived in the Last Year: 2    Unstable Housing in the Last Year: Yes       Family History:     Family History   Problem Relation Age of Onset    Diabetes Mother     Stroke Mother     Diabetes Father     Diabetes Sister     Diabetes Brother     Other Son         GSW    No Known Problems Sister       Medical Decision Making:   I have independently reviewed/ordered the following labs:    CBC with Differential:   Recent Labs     02/02/22  0335 02/03/22  0504   WBC 9.3 8.6   HGB 7.7* 7.7*   HCT 25.5* 25.3*   * 463*   LYMPHOPCT 31 31   MONOPCT 9 11     BMP:  Recent Labs     02/02/22  0542 02/03/22  0504   * 136   K 4.6 4.5    102   CO2 25 25   BUN 19 20   CREATININE 0.94* 0.83     Hepatic Function Panel:   No results for input(s): PROT, LABALBU, BILIDIR, IBILI, BILITOT, ALKPHOS, ALT, AST in the last 72 hours. No results for input(s): RPR in the last 72 hours. No results for input(s): HIV in the last 72 hours. No results for input(s): BC in the last 72 hours. Lab Results   Component Value Date    CREATININE 0.83 02/03/2022    GLUCOSE 205 02/03/2022       Detailed results: Thank you for allowing us to participate in the care of this patient. Please call with questions. This note is created with the assistance of a speech recognition program.  While intending to generate adocument that actually reflects the content of the visit, the document can still have some errors including those of syntax and sound a like substitutions which may escape proof reading. It such instances, actual meaningcan be extrapolated by contextual diversion.         Annitta Galeazzi, Infectious Diseases

## 2022-02-03 NOTE — PROGRESS NOTES
Pt refused POCG check at 1630. PIV infiltrated and removed. Order placed for USG piv and NS aware - trying to find someone to place tonight. Pt is pleasant, regimented about pain control, and capricious about medication compliance.

## 2022-02-03 NOTE — PROGRESS NOTES
[facility] -   Infectious diseases evaluation  admission date 1/22/2022    reason for consultation:   Diabetic foot infection    Impression :   Current:  · Right diabetic foot infection, GAS  · Cellulitis both feet  · bandemia  · Bacteriuria vs UTI kleb S ancef  · CRP elevation    Other:  ·   Discussion / summary of stay / plan of care   ·   Recommendations   · foot cx MSSA R clinda  and GAS- finegoldia and aerococcus  · On  Ancef-2/1 switch to Unasyn   · MRI suggestive of a midfoot collection that could be an abscess, will discuss with Dr. Basil Koo regarding I&D before discharge  · FU office in 2 weeks - DC on invanz 1 g daily x 2 weeks w office FU - might need po Augmentin afterwards  · Recent UC 1/24 : Klebsiella- no dysuria - but lower abd pain - on keflex till 1/29 completed    Infection Control Recommendations   · Rowdy Precautions  · Contact Isolation       Antimicrobial Stewardship Recommendations   · Simplification of therapy  · Targeted therapy  Coordination ofOutpatient Care:   · Estimated Length of IV antimicrobials:  · Patient will need Midline / picc Catheter Insertion:   · Patient will need SNF:  · Patient will need outpatient wound care:     History of Present Illness:   Initial history:  Juan Ryan is a 49y.o.-year-old female with diabetes very well-known to my service, stepped on a piece of glass a few days prior to admission presented with swelling and drainage from right foot, culture came back with group A strep,  Patient has neuropathy from diabetes  WBC elevated 14,  sed [id number],     Podiatry debrided the superficial ulcer and felt it was not too deep yet there was cellulitis of both feet.   Hemoglobin A1c was 10    MRI of the left foot no osteomyelitis  MRI of the right foot no osteomyelitis    Leukocytosis responded to antibiotics, infectious consulted for antibiotic management  Patient is on cefepime and clindamycin    R foot very swollen and tender to light touch - suspect deeper involvement and needs more I/D    1/26  Pt is afebrile and stable this morning. WBC trending up (14 today). UC 1/24 growing Klebsiella. CT right leg 1/25 shows an abscess  Planned for I/D on 1/27  Pain still ++    1/27  Pt seen and examined at bedside, afebrile and hypotensive this morning. Endorsed a lot of pain overnight. WBC improved to 11   To go to OR today for I+D of R foot abscess. Started on Keflex until 1/29 for UTI  Will monitor on antibiotics and await podiatry recs    1/28  Pt afebrile and stable s/p I+D right foot yesterday. Pt c/o pain this morning but is managing. Pt also endorsing abdominal discomfort. XR Abd showing increased stool burden. WBC did increase to 15 from 11 yesterday. Wound Cx 1/27: pending but GPC in pairs seen. Will monitor on Keflex and Clinda    1/29  No fever  Lab: MSSA in wound , but clinda is R  Switching to ceftriaxone  Foot better - less edema and drainage -tender still    1/30  Right foot  but discharge is minimal from it. Otherwise she has no abdominal pain or diarrhea CRP is improved 54    1/31  Alert appropriate, right foot continues to be swollen very tender to touch and it is painful. The wound is not draining much though. No redness noticed. Podiatry watching at this point. Continue antibiotic  WBC is 10 and CRP continues to be 53 has not improved in today    2/1  Right foot remains tender to light touch over the sole and over the first metatarsal  Edema slightly better over the ankle and the dorsum but continues to be present over the shoulder without any drainage  Discussed with Dr. Rob Mi. , he will wait another few days and allow the antibiotics to work before deciding further procedure  Cultures are now growing Aerococcus and Mechelle via, on top of the MSSA and the group A strep    2/2  Foot remains very tender around the hallux and the small area, same areas.   Not improved  MRI suggestive of a collection that could be an abscess    Interval changes  2/2/2022     BP (!) 138/92   Pulse 80   Temp 98.3 °F (36.8 °C) (Oral)   Resp 18   Ht 5' 6\" (1.676 m)   Wt 242 lb 8 oz (110 kg)   LMP  (LMP Unknown)   SpO2 98%   BMI 39.14 kg/m²    Summary of relevant labs:  Labs:  W14 > 11>15-10  [id number]  -54-53  Micro:  cx foot pus GAS  Uc 1/24: Klebsiella  Wound Cx 1/27: GPC pairs - cx pend    Imaging:  MRI of the right foot 2/2  Soft tissue defect along the plantar and medial surface of the midfoot with   suspected irregular fluid collection measuring 4.4 x 1.2 x 1.8 cm, which may   represent an abscess or phlegmon. XR abd 1/27: Increased stool burden seen diffusely throughout the colon suggesting   clinical presentation of constipation.  Phleboliths seen on both sides of the   pelvis. CT R foot 1/25:  1. Shallow soft tissue ulceration plantar and medial to the 1st metatarsal   base with an underlying area of possible abscess versus phlegmon measuring   2.8 x 1.1 x 0.9 cm.   2. Extensive subcutaneous fat stranding compatible with cellulitis.  No soft   tissue gas. 3. No evidence of osteomyelitis or other acute osseous abnormality. U/S renal 1/25: Unremarkable    MRI R foot 1/23:  Subcutaneous edema and swelling, most notably affecting the dorsum of the   foot, which may reflect underlying cellulitis.  No discrete organized fluid   collection is seen within the limitations of a noncontrast study.       No imaging features of acute osteomyelitis appreciated.        MRI L foot: 1/23:   Limited evaluation due to patient motion artifact on several sequences.       Skin thickening and subcutaneous edema seen within the plantar soft tissues   of the foot at the level of the proximal metatarsals, which may reflect   underlying cellulitis.  Within the limitations of a noncontrast study, no   discrete organized fluid collection is seen.       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 I have personally reviewed the past medical history, past surgical history, medications, social history, and family history, and I haveupdated the database accordingly. Allergies:   Bactrim [sulfamethoxazole-trimethoprim] and Adhesive tape     Review of Systems:     Review of Systems   Constitutional: Negative for activity change, appetite change, chills and diaphoresis. HENT: Negative for congestion. Eyes: Negative for photophobia and discharge. Respiratory: Negative for apnea, cough and shortness of breath. Cardiovascular: Negative for chest pain. Gastrointestinal: Negative for abdominal distention. Endocrine: Negative for cold intolerance, polydipsia and polyphagia. Genitourinary: Negative for dysuria and flank pain. Musculoskeletal: Negative for arthralgias. Skin: Positive for color change and wound. Allergic/Immunologic: Negative for environmental allergies, food allergies and immunocompromised state. Neurological: Negative for dizziness, tremors, seizures, syncope, light-headedness, numbness and headaches. Hematological: Negative for adenopathy. Psychiatric/Behavioral: Negative for agitation. Physical Examination :       Physical Exam  Constitutional:       General: She is not in acute distress. Appearance: Normal appearance. She is not ill-appearing, toxic-appearing or diaphoretic. HENT:      Head: Normocephalic and atraumatic. Nose: Nose normal. No congestion or rhinorrhea. Eyes:      General: No scleral icterus. Pupils: Pupils are equal, round, and reactive to light. Cardiovascular:      Rate and Rhythm: Normal rate and regular rhythm. Heart sounds: Normal heart sounds. No murmur heard. Pulmonary:      Effort: No respiratory distress. Breath sounds: Normal breath sounds. No stridor. No wheezing. Abdominal:      General: There is no distension. Palpations: Abdomen is soft. There is no mass. Tenderness: There is no abdominal tenderness. Hernia: No hernia is present. Genitourinary:     Comments: Urine fatuma  Musculoskeletal:         General: Swelling present. No tenderness, deformity or signs of injury. Cervical back: Neck supple. No rigidity or tenderness. Skin:     General: Skin is dry. Coloration: Skin is not jaundiced or pale. Findings: Erythema present. No bruising. Neurological:      Mental Status: She is alert and oriented to person, place, and time. Mental status is at baseline. Psychiatric:         Mood and Affect: Mood normal.         Thought Content:  Thought content normal.         Past Medical History:     Past Medical History:   Diagnosis Date    Acid reflux 5/29/2017    Acute cystitis with hematuria 10/11/2016    Acute non-recurrent maxillary sinusitis 11/28/2017    Asthma     Bipolar 1 disorder ([address] 1/4/2018    Bipolar disorder, mixed ([address]     BMI 34.0-34.9,adult 11/28/2017    Cannabis use disorder, severe, dependence ([address] 12/19/2017    Cerebrovascular accident (CVA) ([address] 6/14/2017    Chest pain 11/5/2016    Chronic renal insufficiency     Cocaine abuse ([address] 1/5/2018    COVID-19 virus RNA test result unknown     DDD (degenerative disc disease), cervical     Diabetes mellitus ([address]     Dizziness 6/13/2017    Fibromyalgia     History of stroke 9/9/2017    Homicidal ideation 11/6/2017    Hyperglycemia     Hypertension     Hypotension 1/18/2019    IDDM (insulin dependent diabetes mellitus) 12/21/2015    Lupus ([address]     Migraine     Neuropathy     Neuropathy     Polysubstance abuse ([address] 9/17/2017    Post traumatic stress disorder (PTSD)     Posttraumatic stress disorder 5/29/2017    Recurrent depression ([address] 5/29/2017    Recurrent major depressive disorder, in partial remission ([address] 11/28/2017    Screening mammogram, encounter for 11/28/2017    Severe recurrent major depression with psychotic features ([facility] 12/19/2017    Severe recurrent major depression without psychotic features ([facility] 12/19/2017    Stroke (cerebrum) ([facility] 6/14/2017    Stroke ([facility]     per chart notes    Suicidal ideation     Suicidal intent 3/10/2017    Vitamin D deficiency 11/28/2017    White matter changes 6/13/2017       Past Surgical  History:     Past Surgical History:   Procedure Laterality Date    ABDOMEN SURGERY      drain tube    ABSCESS DRAINAGE      right buttock    CATARACT REMOVAL WITH IMPLANT Bilateral     CHEST TUBE INSERTION      FINGER AMPUTATION Left 03/13/2021    LEFT RING FINER AMPUTATION performed by Lilia Encarnacion MD at [facility] Right 10/11/2021    AMPUTATION RIGHT INDEX FINGER performed by Lilia Encarnacion MD at [address] [place] [address] Right 1/27/2022    FOOT ABSCESS INCISION AND DRAINAGE  (PULSE LAVAGE, CULTURE SWABS) performed by Antwon Hernandez DPM at [address] [facility] Right 01/27/2022    FOOT ABSCESS INCISION AND DRAINAGE (PULSE LAVAGE, CULTURE SWABS) - Right    HAND SURGERY Right 09/14/2021    I&D INDEX FINGER performed by Lilia Encarnacion MD at [address] [place]  Right 09/15/2021    I&D INDEX FINGER performed by Lilia Encarnacion MD at [address] [place] [address] Bilateral        Medications:      ampicillin-sulbactam  3,000 mg IntraVENous Q6H    insulin glargine  25 Units SubCUTAneous BID    enoxaparin  30 mg SubCUTAneous BID    budesonide-formoterol  2 puff Inhalation BID    dicyclomine  10 mg Oral 4x Daily    fluticasone  1 spray Each Nostril Daily    mirtazapine  7.5 mg Oral Nightly    pantoprazole  20 mg Oral BID AC    pregabalin  200 mg Oral BID    traZODone  150 mg Oral Nightly    venlafaxine  150 mg Oral Daily with breakfast    sodium chloride flush  5-40 mL IntraVENous 2 times per day    insulin lispro  0-12 Units SubCUTAneous TID WC    insulin lispro  0-6 Units SubCUTAneous Nightly       Social History: Social History     Socioeconomic History    Marital status: Legally      Spouse name: Not on file    Number of children: Not on file    Years of education: Not on file    Highest education level: Not on file   Occupational History    Not on file   Tobacco Use    Smoking status: Never Smoker    Smokeless tobacco: Never Used   Vaping Use    Vaping Use: Never used   Substance and Sexual Activity    Alcohol use: Not Currently    Drug use: Yes     Types: Marijuana (Jamal Idania), Cocaine     Comment: + Cocaine 2/2021 also, see Care Everywhere UDS    Sexual activity: Not Currently     Partners: Male   Other Topics Concern    Not on file   Social History Narrative    Not on file     Social Determinants of Health     Financial Resource Strain: High Risk    Difficulty of Paying Living Expenses: Hard   Food Insecurity: Food Insecurity Present    Worried About Running Out of Food in the Last Year: Often true    Kelin of Food in the Last Year: Often true   Transportation Needs: Unmet Transportation Needs    Lack of Transportation (Medical):  Yes    Lack of Transportation (Non-Medical): Yes   Physical Activity: Inactive    Days of Exercise per Week: 0 days    Minutes of Exercise per Session: 0 min   Stress: Stress Concern Present    Feeling of Stress : Rather much   Social Connections:     Frequency of Communication with Friends and Family: Not on file    Frequency of Social Gatherings with Friends and Family: Not on file    Attends Druze Services: Not on file    Active Member of Clubs or Organizations: Not on file    Attends Club or Organization Meetings: Not on file    Marital Status: Not on file   Intimate Partner Violence:     Fear of Current or Ex-Partner: Not on file    Emotionally Abused: Not on file    Physically Abused: Not on file    Sexually Abused: Not on file   Housing Stability: High Risk    Unable to Pay for Housing in the Last Year: Yes    Number of [place] in the Last Year: 2    Unstable Housing in the Last Year: Yes       Family History:     Family History   Problem Relation Age of Onset    Diabetes Mother     Stroke Mother     Diabetes Father     Diabetes Sister     Diabetes Brother     Other Son         GSW    No Known Problems Sister       Medical Decision Making:   I have independently reviewed/ordered the following labs:    CBC with Differential:   Recent Labs     02/01/22  0552 02/02/22  0335   WBC 8.4 9.3   HGB 7.7* 7.7*   HCT 24.8* 25.5*    479*   LYMPHOPCT 31 31   MONOPCT 8 9     BMP:  Recent Labs     02/01/22  0552 02/02/22  0542    133*   K 4.3 4.6    101   CO2 24 25   BUN 13 19   CREATININE 0.78 0.94*     Hepatic Function Panel:   No results for input(s): PROT, LABALBU, BILIDIR, IBILI, BILITOT, ALKPHOS, ALT, AST in the last 72 hours. No results for input(s): RPR in the last 72 hours. No results for input(s): HIV in the last 72 hours. No results for input(s): BC in the last 72 hours. Lab Results   Component Value Date    CREATININE 0.94 02/02/2022    GLUCOSE 161 02/02/2022       Detailed results: Thank you for allowing us to participate in the care of this patient. Please call with questions. This note is created with the assistance of a speech recognition program.  While intending to generate adocument that actually reflects the content of the visit, the document can still have some errors including those of syntax and sound a like substitutions which may escape proof reading. It such instances, actual meaningcan be extrapolated by contextual diversion.         Maylin Puga, Infectious Diseases

## 2022-02-03 NOTE — PROGRESS NOTES
[facility]  Progress Note    Date:   2/3/2022  Patient name:  Mai Boggs  Date of admission:  1/22/2022  4:43 PM  MRN:   [id number]  YOB: 1965    SUBJECTIVE/Last 24 hours update:     Patient seen and examined at the bed side, she did complain of vaginal itching and white clumpy discharge last night and was given one dose of Diflucan. States that it is slightly improved but not completely. Will do 2 more doses of Diflucan. Patient also says her pain in R foot is 10/10 and wants something stronger, informed patient we will defer pain management to Podiatry team. Plan to discharge patient once outpatient infusions for antibiotic therapy are arranged. Notes from nursing staff and Consults had been reviewed, and the overnight progress had been checked with the nursing staff as well.     REVIEW OF SYSTEMS:      CONSTITUTIONAL:  no fevers, no headcahes  EYES: negative for blury vision  HEENT: No headaches, No nasal congestion, no difficulty swallowing  RESPIRATORY:negative for dyspnea, no wheezing, no Cough  CARDIOVASCULAR: negative for chest pain, no palpitations  GASTROINTESTINAL: no nausea, no vomiting, no change in bowel habits, no abdominal pain   GENITOURINARY: + vaginal discharge and pruritus   MUSCULOSKELETAL: + R foot pain, no swelling of joints or extremities  NEUROLOGICAL: No  Weakness or numbness    PAST MEDICAL HISTORY:      has a past medical history of Acid reflux, Acute cystitis with hematuria, Acute non-recurrent maxillary sinusitis, Asthma, Bipolar 1 disorder ([address] Bipolar disorder, mixed ([address] BMI 34.0-34.9,adult, Cannabis use disorder, severe, dependence ([address] Cerebrovascular accident (CVA) ([address] Chest pain, Chronic renal insufficiency, Cocaine abuse ([address] COVID-19 virus RNA test result unknown, DDD (degenerative disc disease), cervical, Diabetes mellitus ([address] Dizziness, Fibromyalgia, History of stroke, Homicidal ideation, Hyperglycemia, Hypertension, Hypotension, IDDM (insulin dependent diabetes mellitus), Lupus ([address] Migraine, Neuropathy, Neuropathy, Polysubstance abuse ([address] Post traumatic stress disorder (PTSD), Posttraumatic stress disorder, Recurrent depression ([address] Recurrent major depressive disorder, in partial remission ([address] Screening mammogram, encounter for, Severe recurrent major depression with psychotic features ([address] Severe recurrent major depression without psychotic features ([address] Stroke (cerebrum) ([address] Stroke ([address] Suicidal ideation, Suicidal intent, Vitamin D deficiency, and White matter changes. PAST SURGICAL HISTORY:      has a past surgical history that includes Abscess Drainage; chest tube insertion; Abdomen surgery; Cataract removal with implant (Bilateral); LASIK (Bilateral); Finger amputation (Left, 03/13/2021); Hand surgery (Right, 09/14/2021); Hand surgery (Right, 09/15/2021); Finger amputation (Right, 10/11/2021); Foot surgery (Right, 01/27/2022); and Foot Debridement (Right, 1/27/2022). SOCIAL HISTORY:      reports that she has never smoked. She has never used smokeless tobacco. She reports previous alcohol use. She reports current drug use. Drugs: Marijuana (Weed) and Cocaine. FAMILY HISTORY:     family history includes Diabetes in her brother, father, mother, and sister; No Known Problems in her sister; Other in her son; Stroke in her mother. HOME MEDICATIONS:      Prior to Admission medications    Medication Sig Start Date End Date Taking? Authorizing Provider   ertapenem [facility]) infusion Infuse 1,000 mg intravenously every 24 hours for 14 days Compound per protocol. 1/30/22 2/13/22 Yes Arlette Lilly Duane, MD   pregabalin (LYRICA) 200 MG capsule Take 1 capsule by mouth 2 times daily for 30 days.  1/20/22 2/19/22  Lilli Caballero MD   insulin glargine (LANTUS SOLOSTAR) 100 UNIT/ML injection pen Inject 30 Units into the skin 2 times daily 1/20/22   Lilli Caballero MD   fluticasone (FLONASE) 50 MCG/ACT nasal spray 1 spray by Each Nostril route daily 12/22/21   Frances Aranda MD   acetaminophen (TYLENOL) 500 MG tablet Take 2 tablets by mouth 3 times daily as needed for Pain 12/22/21   Frances Aranda MD   dicyclomine (BENTYL) 10 MG capsule Take 1 capsule by mouth 4 times daily 12/17/21   Frances Aranda MD   metFORMIN (GLUCOPHAGE) 1000 MG tablet Take 1 tablet by mouth 2 times daily (with meals) 12/17/21   Frances Aranda MD   mirtazapine (REMERON) 7.5 MG tablet Take 1 tablet by mouth nightly 12/17/21   Frances Aranda MD   pantoprazole (PROTONIX) 20 MG tablet Take 1 tablet by mouth 2 times daily (before meals) 12/17/21   Frances Aranda MD   traZODone (DESYREL) 150 MG tablet Take 1 tablet by mouth nightly 12/17/21   Frances Aranda MD   venlafaxine (EFFEXOR XR) 150 MG extended release capsule Take 1 capsule by mouth daily (with breakfast) 12/17/21   Frances Aranda MD   ibuprofen (ADVIL;MOTRIN) 800 MG tablet Take 1 tablet by mouth every 8 hours as needed for Pain 11/15/21 11/29/21  Ascencion Queen MD   Lancets MISC 1 each by Does not apply route 3 times daily 11/15/21   Ascencion Queen MD   Alcohol Swabs 70 % PADS Use 1 swab 3 times daily as needed 11/15/21   Ascencion Queen MD   blood glucose monitor strips Test 3 times a day & as needed for symptoms of irregular blood glucose. Dispense sufficient amount for indicated testing frequency plus additional to accommodate PRN testing needs.  11/8/21   Tayla Kelly MD   Lancets MISC 1 each by Does not apply route 3 times daily 11/8/21   Tayla Kelly MD   Insulin Pen Needle ([employer] PEN NEEDLES 31G) 31G X 8 MM MISC 1 each by Does not apply route daily 11/8/21   Mae Bustamante MD   FREESTYLE LITE strip 1 each by Does not apply route 3 times daily 11/11/20   Frances Aranda MD   albuterol sulfate  (90 Base) MCG/ACT inhaler Inhale 2 puffs into the lungs every 4 hours as needed for Wheezing 10/20/20 9/22/21 Anabel Rizvi MD   FLOVENT  MCG/ACT inhaler Inhale 2 puffs into the lungs 2 times daily 10/20/20   Anabel Rizvi MD   budesonide-formoterol (SYMBICORT) 80-4.5 MCG/ACT AERO Inhale 2 puffs into the lungs 2 times daily 10/20/20   Anabel Rizvi MD       ALLERGIES:     Bactrim [sulfamethoxazole-trimethoprim] and Adhesive tape      OBJECTIVE:       Vitals:    02/01/22 2042 02/02/22 1142 02/02/22 1930 02/03/22 0801   BP:   (!) 138/92 116/75   Pulse:   80 84   Resp:  18 18 16   Temp: 98.6 °F (37 °C)  98.3 °F (36.8 °C) 98 °F (36.7 °C)   TempSrc: Oral  Oral Axillary   SpO2: 100% 98% 98% 98%   Weight:       Height:           No intake or output data in the 24 hours ending 02/03/22 1224    PHYSICAL EXAM:  General Appearance  Alert , awake , not in acute distress  HEENT - Head is normocephalic, atraumatic. Lungs - Bilateral equal air entry , no wheezes, rales or rhonchi, aeration good  Cardiovascular - Heart sounds are normal.  Regular rhythm, normal rate without murmur, gallop or rub.   Abdomen - Soft, nontender, nondistended, no masses or organomegaly  Neurologic - There are no new focal motor or sensory deficits  Skin - No bruising or bleeding on exposed skin area  Extremities - R foot bandaged       DIAGNOSTICS:     Laboratory Testing:    Recent Results (from the past 24 hour(s))   POC Glucose Fingerstick    Collection Time: 02/02/22  8:26 PM   Result Value Ref Range    POC Glucose 287 (H) 65 - 105 mg/dL   Basic Metabolic Panel w/ Reflex to MG    Collection Time: 02/03/22  5:04 AM   Result Value Ref Range    Glucose 205 (H) 70 - 99 mg/dL    BUN 20 6 - 20 mg/dL    CREATININE 0.83 0.50 - 0.90 mg/dL    Bun/Cre Ratio NOT REPORTED 9 - 20    Calcium 9.1 8.6 - 10.4 mg/dL    Sodium 136 135 - 144 mmol/L    Potassium 4.5 3.7 - 5.3 mmol/L    Chloride 102 98 - 107 mmol/L    CO2 25 20 - 31 mmol/L    Anion Gap 9 9 - 17 mmol/L    GFR Non-African American >60 >60 mL/min    GFR African American >60 >60 mL/min    GFR Comment          GFR Staging NOT REPORTED    CBC auto differential    Collection Time: 02/03/22  5:04 AM   Result Value Ref Range    WBC 8.6 3.5 - 11.3 k/uL    RBC 2.53 (L) 3.95 - 5.11 m/uL    Hemoglobin 7.7 (L) 11.9 - 15.1 g/dL    Hematocrit 25.3 (L) 36.3 - 47.1 %    .0 82.6 - 102.9 fL    MCH 30.4 25.2 - 33.5 pg    MCHC 30.4 28.4 - 34.8 g/dL    RDW 13.9 11.8 - 14.4 %    Platelets 733 (H) 748 - 453 k/uL    MPV 10.0 8.1 - 13.5 fL    NRBC Automated 0.0 0.0 per 100 WBC    Differential Type NOT REPORTED     WBC Morphology NOT REPORTED     RBC Morphology NOT REPORTED     Platelet Estimate NOT REPORTED     Seg Neutrophils 54 36 - 65 %    Lymphocytes 31 24 - 43 %    Monocytes 11 3 - 12 %    Eosinophils % 2 1 - 4 %    Basophils 1 0 - 2 %    Immature Granulocytes 1 (H) 0 %    Segs Absolute 4.70 1.50 - 8.10 k/uL    Absolute Lymph # 2.69 1.10 - 3.70 k/uL    Absolute Mono # 0.94 0.10 - 1.20 k/uL    Absolute Eos # 0.20 0.00 - 0.44 k/uL    Basophils Absolute 0.05 0.00 - 0.20 k/uL    Absolute Immature Granulocyte 0.06 0.00 - 0.30 k/uL   POC Glucose Fingerstick    Collection Time: 02/03/22  6:48 AM   Result Value Ref Range    POC Glucose 191 (H) 65 - 105 mg/dL   POC Glucose Fingerstick    Collection Time: 02/03/22 11:14 AM   Result Value Ref Range    POC Glucose 270 (H) 65 - 105 mg/dL       Current Facility-Administered Medications   Medication Dose Route Frequency Provider Last Rate Last Admin    fluconazole (DIFLUCAN) tablet 150 mg  150 mg Oral Daily Astrid Carrillo MD   150 mg at 02/03/22 1208    hydrOXYzine (ATARAX) tablet 10 mg  10 mg Oral TID PRN Thomas Moe MD   10 mg at 02/03/22 0050    ampicillin-sulbactam (UNASYN) 3000 mg ivpb minibag  3,000 mg IntraVENous Q6H Maylin Espinoza MD   Stopped at 02/03/22 0811    insulin glargine (LANTUS) injection vial 25 Units  25 Units SubCUTAneous BID Lisa Garay MD   25 Units at 02/03/22 0816    simethicone (MYLICON) chewable tablet 80 mg  80 mg Oral Q6H PIOTRN Ann Marie ROPER Fauzia Palmer MD   80 mg at 02/01/22 1704    enoxaparin (LOVENOX) injection 30 mg  30 mg SubCUTAneous BID Bassam Ratliff DPM   30 mg at 02/01/22 2028    albuterol sulfate  (90 Base) MCG/ACT inhaler 2 puff  2 puff Inhalation Q4H PRN Bassam Ratliff DPM   2 puff at 02/01/22 1712    oxyCODONE (ROXICODONE) immediate release tablet 5 mg  5 mg Oral Q4H PRN Bassam Ratliff DPM   5 mg at 02/03/22 1026    budesonide-formoterol (SYMBICORT) 80-4.5 MCG/ACT inhaler 2 puff  2 puff Inhalation BID Bassam Ratliff DPM   2 puff at 02/03/22 0811    dicyclomine (BENTYL) capsule 10 mg  10 mg Oral 4x Daily Bassam Ratliff DPM   10 mg at 02/03/22 0814    fluticasone (FLONASE) 50 MCG/ACT nasal spray 1 spray  1 spray Each Nostril Daily Bassam Ratliff DPM   1 spray at 02/03/22 0816    mirtazapine (REMERON) tablet 7.5 mg  7.5 mg Oral Nightly Bassam Ratliff DPM   7.5 mg at 02/02/22 2031    pantoprazole (PROTONIX) tablet 20 mg  20 mg Oral BID AC Bassam Ratliff DPM   20 mg at 02/03/22 0617    pregabalin (LYRICA) capsule 200 mg  200 mg Oral BID Bassam Ratliff DPM   200 mg at 02/03/22 0576    traZODone (DESYREL) tablet 150 mg  150 mg Oral Nightly Bassam Ratliff DPM   150 mg at 02/02/22 2031    venlafaxine (EFFEXOR XR) extended release capsule 150 mg  150 mg Oral Daily with breakfast Bassam Ratliff DPM   150 mg at 02/03/22 0630    sodium chloride flush 0.9 % injection 5-40 mL  5-40 mL IntraVENous 2 times per day Bassam Ratliff DPM   10 mL at 02/03/22 4797    sodium chloride flush 0.9 % injection 5-40 mL  5-40 mL IntraVENous PRN Colen Gauze, DPM        0.9 % sodium chloride infusion  25 mL IntraVENous PRN Colen Gauze, DPM        ondansetron (ZOFRAN-ODT) disintegrating tablet 4 mg  4 mg Oral Q8H PRN Colen Gauze, DPM   4 mg at 01/29/22 1201    Or    ondansetron (ZOFRAN) injection 4 mg  4 mg IntraVENous Q6H PRN Colen Gauze, DPM        polyethylene glycol (GLYCOLAX) packet 17 g  17 g Oral Daily PRN Bassam Gauze, DPM        acetaminophen (TYLENOL) tablet 650 mg  650 mg Oral Q6H PRN Colen Gauze, DPM 650 mg at 02/02/22 1540    Or    acetaminophen (TYLENOL) suppository 650 mg  650 mg Rectal Q6H PRN Eddye Umair, DPM        potassium chloride (KLOR-CON M) extended release tablet 40 mEq  40 mEq Oral PRN Eddye Umair, DPM        Or    potassium bicarb-citric acid (EFFER-K) effervescent tablet 40 mEq  40 mEq Oral PRN Eddye Umair, DPM        Or    potassium chloride 10 mEq/100 mL IVPB (Peripheral Line)  10 mEq IntraVENous PRN Eddye Umair, DPM        insulin lispro (HUMALOG) injection vial 0-12 Units  0-12 Units SubCUTAneous TID WC Eddye Umair, DPM   2 Units at 02/03/22 0816    insulin lispro (HUMALOG) injection vial 0-6 Units  0-6 Units SubCUTAneous Nightly Eddye Umair, DPM   3 Units at 02/02/22 2036    glucose (GLUTOSE) 40 % oral gel 15 g  15 g Oral PRN Eddye Umair, DPM   15 g at 01/26/22 0350    dextrose 50 % IV solution  12.5 g IntraVENous PRN Edlaz Block, DPM        glucagon (rDNA) injection 1 mg  1 mg IntraMUSCular PRN Eddye Umair, DPM        dextrose 5 % solution  100 mL/hr IntraVENous PRN Eddye Umair, DPM           ASSESSMENT:     Active Problems:    Type 2 diabetes mellitus without complication, with long-term current use of insulin ([facility])    Right foot infection    Cellulitis and abscess of toe of right foot    Abscess of right foot    Bilateral cellulitis of lower leg related to related to  uncontrolled diabetes    Right foot ulcer, with fat layer exposed ([address]    Ulcer of left foot, with fat layer exposed ([address]    CRP elevated  Resolved Problems:    * No resolved hospital problems.  *      PLAN:     Right foot wound s/p I&D x 2  - Afebrile   - S/p R foot I&D   - Wound culture positive for MSSA, group A strep, Finegoldia, Aerococcus   - Podiatry on board   - No further surgical intervention at this time    - WBAT  - ID on board   - On Unasyn   - DC home on Invanz 1g daily x 2 weeks, with possible Augmentin afterwards   - FU outpatient office in 2 weeks  - Pain management, Roxicodone 5mg q4  - MRI concerning for possible abscess vs phlegmon, no osteomyelitis     Vaginal yeast infection  - New onset white clumpy vaginal discharge  - On antibiotic therapy   - Given 1 dose of Diflucan on 2/2/2022 with some relief  - 2 more doses of Diflucan to be given     DM2  - A1C 10   - Glucose 205  - POCT glucose   - Lantus 25U BID  - Medium dose sliding scale   - Hypoglycemia protocol      Bipolar disorder  - Continue Remeron 7.5mg PO nightly   - Effexor  mg PO daily   - Trazodone 150 mg PO nightly      Discussed care plan with nurse after getting her input.     Above plan discussed with the patient, who agreed to the above plan      DVT prophylaxis: Lovenox 40 mg SC  GI prophylaxis: Protonix 20 mg BID      Plan will be discussed with the attending, Dr. Janice Clark MD  Family Medicine Resident  2/3/2022 12:24 PM            Please note that this chart was generated using voice recognition Dragon dictation software.   Although every effort was made to ensure the accuracy of this automated transcription, some errors in transcription may have occurred

## 2022-02-03 NOTE — CARE COORDINATION
Left message for Juwan Aguilera at [facility] to verify they still have a bed for the patient due to possibility of discharge today. [phone] Call to Amberly Castro at [facility] to verify availability of a bed for patient. She is requesting recent clinicals, PT/OT and H&P be sent to restart a precert as it has . Faxed.

## 2022-02-03 NOTE — PROGRESS NOTES
Pt has Dc order. per CM, pt lost pre-cert at [facility].   Facility to restart

## 2022-02-03 NOTE — PLAN OF CARE
Problem: Falls - Risk of:  Goal: Will remain free from falls  Description: Will remain free from falls  Outcome: Ongoing  Goal: Absence of physical injury  Description: Absence of physical injury  Outcome: Ongoing     Problem: Musculor/Skeletal Functional Status  Goal: Highest potential functional level  Outcome: Ongoing     Problem: Nutrition  Goal: Optimal nutrition therapy  Outcome: Ongoing     Problem: Pain:  Goal: Pain level will decrease  Description: Pain level will decrease  Outcome: Not Met This Shift  Goal: Control of acute pain  Description: Control of acute pain  Outcome: Not Met This Shift  Goal: Control of chronic pain  Description: Control of chronic pain  Outcome: Not Met This Shift

## 2022-02-03 NOTE — PLAN OF CARE
Problem: Pain:  Goal: Pain level will decrease  Description: Pain level will decrease  Outcome: Ongoing  Goal: Control of acute pain  Description: Control of acute pain  Outcome: Ongoing  Goal: Control of chronic pain  Description: Control of chronic pain  Outcome: Ongoing     Problem: Falls - Risk of:  Goal: Will remain free from falls  Description: Will remain free from falls  Outcome: Ongoing  Goal: Absence of physical injury  Description: Absence of physical injury  Outcome: Ongoing     Problem: Musculor/Skeletal Functional Status  Goal: Highest potential functional level  Outcome: Ongoing     Problem: Nutrition  Goal: Optimal nutrition therapy  Outcome: Ongoing     Problem: Skin Integrity:  Goal: Will show no infection signs and symptoms  Description: Will show no infection signs and symptoms  Outcome: Ongoing  Goal: Absence of new skin breakdown  Description: Absence of new skin breakdown  Outcome: Ongoing

## 2022-02-04 ENCOUNTER — CARE COORDINATION (OUTPATIENT)
Dept: CARE COORDINATION | Age: 57
End: 2022-02-04

## 2022-02-04 LAB
GLUCOSE BLD-MCNC: 151 MG/DL (ref 65–105)
GLUCOSE BLD-MCNC: 199 MG/DL (ref 65–105)
GLUCOSE BLD-MCNC: 238 MG/DL (ref 65–105)
GLUCOSE BLD-MCNC: 298 MG/DL (ref 65–105)

## 2022-02-04 PROCEDURE — 94640 AIRWAY INHALATION TREATMENT: CPT

## 2022-02-04 PROCEDURE — 6370000000 HC RX 637 (ALT 250 FOR IP): Performed by: PODIATRIST

## 2022-02-04 PROCEDURE — 6370000000 HC RX 637 (ALT 250 FOR IP)

## 2022-02-04 PROCEDURE — 97110 THERAPEUTIC EXERCISES: CPT

## 2022-02-04 PROCEDURE — 2580000003 HC RX 258: Performed by: PODIATRIST

## 2022-02-04 PROCEDURE — 2580000003 HC RX 258: Performed by: INTERNAL MEDICINE

## 2022-02-04 PROCEDURE — 6370000000 HC RX 637 (ALT 250 FOR IP): Performed by: STUDENT IN AN ORGANIZED HEALTH CARE EDUCATION/TRAINING PROGRAM

## 2022-02-04 PROCEDURE — 99232 SBSQ HOSP IP/OBS MODERATE 35: CPT | Performed by: INTERNAL MEDICINE

## 2022-02-04 PROCEDURE — 82947 ASSAY GLUCOSE BLOOD QUANT: CPT

## 2022-02-04 PROCEDURE — 1200000000 HC SEMI PRIVATE

## 2022-02-04 PROCEDURE — 94761 N-INVAS EAR/PLS OXIMETRY MLT: CPT

## 2022-02-04 PROCEDURE — 99233 SBSQ HOSP IP/OBS HIGH 50: CPT | Performed by: STUDENT IN AN ORGANIZED HEALTH CARE EDUCATION/TRAINING PROGRAM

## 2022-02-04 PROCEDURE — 6360000002 HC RX W HCPCS: Performed by: INTERNAL MEDICINE

## 2022-02-04 PROCEDURE — 97116 GAIT TRAINING THERAPY: CPT

## 2022-02-04 RX ORDER — INSULIN GLARGINE 100 [IU]/ML
28 INJECTION, SOLUTION SUBCUTANEOUS 2 TIMES DAILY
Status: DISCONTINUED | OUTPATIENT
Start: 2022-02-04 | End: 2022-02-06

## 2022-02-04 RX ADMIN — FLUCONAZOLE 150 MG: 100 TABLET ORAL at 09:14

## 2022-02-04 RX ADMIN — DICYCLOMINE HYDROCHLORIDE 10 MG: 10 CAPSULE ORAL at 20:37

## 2022-02-04 RX ADMIN — OXYCODONE HYDROCHLORIDE 5 MG: 5 TABLET ORAL at 21:24

## 2022-02-04 RX ADMIN — INSULIN GLARGINE 28 UNITS: 100 INJECTION, SOLUTION SUBCUTANEOUS at 20:40

## 2022-02-04 RX ADMIN — OXYCODONE HYDROCHLORIDE 5 MG: 5 TABLET ORAL at 03:59

## 2022-02-04 RX ADMIN — VENLAFAXINE HYDROCHLORIDE 150 MG: 150 CAPSULE, EXTENDED RELEASE ORAL at 09:14

## 2022-02-04 RX ADMIN — DICYCLOMINE HYDROCHLORIDE 10 MG: 10 CAPSULE ORAL at 13:07

## 2022-02-04 RX ADMIN — ACETAMINOPHEN 650 MG: 325 TABLET ORAL at 17:32

## 2022-02-04 RX ADMIN — AMPICILLIN SODIUM AND SULBACTAM SODIUM 3000 MG: 2; 1 INJECTION, POWDER, FOR SOLUTION INTRAMUSCULAR; INTRAVENOUS at 11:11

## 2022-02-04 RX ADMIN — BUDESONIDE AND FORMOTEROL FUMARATE DIHYDRATE 2 PUFF: 80; 4.5 AEROSOL RESPIRATORY (INHALATION) at 09:15

## 2022-02-04 RX ADMIN — OXYCODONE HYDROCHLORIDE 5 MG: 5 TABLET ORAL at 11:17

## 2022-02-04 RX ADMIN — FLUTICASONE PROPIONATE 1 SPRAY: 50 SPRAY, METERED NASAL at 09:15

## 2022-02-04 RX ADMIN — PREGABALIN 200 MG: 100 CAPSULE ORAL at 09:14

## 2022-02-04 RX ADMIN — MIRTAZAPINE 7.5 MG: 15 TABLET, FILM COATED ORAL at 20:38

## 2022-02-04 RX ADMIN — AMPICILLIN SODIUM AND SULBACTAM SODIUM 3000 MG: 2; 1 INJECTION, POWDER, FOR SOLUTION INTRAMUSCULAR; INTRAVENOUS at 17:36

## 2022-02-04 RX ADMIN — INSULIN LISPRO 2 UNITS: 100 INJECTION, SOLUTION INTRAVENOUS; SUBCUTANEOUS at 20:40

## 2022-02-04 RX ADMIN — INSULIN GLARGINE 25 UNITS: 100 INJECTION, SOLUTION SUBCUTANEOUS at 09:15

## 2022-02-04 RX ADMIN — DICYCLOMINE HYDROCHLORIDE 10 MG: 10 CAPSULE ORAL at 17:32

## 2022-02-04 RX ADMIN — DICYCLOMINE HYDROCHLORIDE 10 MG: 10 CAPSULE ORAL at 09:15

## 2022-02-04 RX ADMIN — POLYETHYLENE GLYCOL 3350 17 G: 17 POWDER, FOR SOLUTION ORAL at 11:17

## 2022-02-04 RX ADMIN — PREGABALIN 200 MG: 100 CAPSULE ORAL at 20:37

## 2022-02-04 RX ADMIN — SODIUM CHLORIDE, PRESERVATIVE FREE 10 ML: 5 INJECTION INTRAVENOUS at 09:17

## 2022-02-04 RX ADMIN — OXYCODONE HYDROCHLORIDE 5 MG: 5 TABLET ORAL at 17:32

## 2022-02-04 RX ADMIN — HYDROXYZINE HYDROCHLORIDE 10 MG: 10 TABLET ORAL at 17:32

## 2022-02-04 RX ADMIN — INSULIN LISPRO 2 UNITS: 100 INJECTION, SOLUTION INTRAVENOUS; SUBCUTANEOUS at 17:46

## 2022-02-04 RX ADMIN — HYDROXYZINE HYDROCHLORIDE 10 MG: 10 TABLET ORAL at 04:46

## 2022-02-04 RX ADMIN — SODIUM CHLORIDE, PRESERVATIVE FREE 10 ML: 5 INJECTION INTRAVENOUS at 20:38

## 2022-02-04 RX ADMIN — INSULIN LISPRO 6 UNITS: 100 INJECTION, SOLUTION INTRAVENOUS; SUBCUTANEOUS at 11:36

## 2022-02-04 RX ADMIN — PANTOPRAZOLE SODIUM 20 MG: 40 TABLET, DELAYED RELEASE ORAL at 17:34

## 2022-02-04 RX ADMIN — PANTOPRAZOLE SODIUM 20 MG: 40 TABLET, DELAYED RELEASE ORAL at 05:55

## 2022-02-04 RX ADMIN — TRAZODONE HYDROCHLORIDE 150 MG: 100 TABLET ORAL at 20:37

## 2022-02-04 RX ADMIN — AMPICILLIN SODIUM AND SULBACTAM SODIUM 3000 MG: 2; 1 INJECTION, POWDER, FOR SOLUTION INTRAMUSCULAR; INTRAVENOUS at 05:55

## 2022-02-04 ASSESSMENT — PAIN DESCRIPTION - LOCATION: LOCATION: FOOT

## 2022-02-04 ASSESSMENT — PAIN SCALES - GENERAL
PAINLEVEL_OUTOF10: 10
PAINLEVEL_OUTOF10: 7
PAINLEVEL_OUTOF10: 10

## 2022-02-04 ASSESSMENT — ENCOUNTER SYMPTOMS
COLOR CHANGE: 1
EYE REDNESS: 0
EYE ITCHING: 0
APNEA: 0
PHOTOPHOBIA: 0
SHORTNESS OF BREATH: 0
COUGH: 0
EYE PAIN: 0
ABDOMINAL DISTENTION: 0
EYE DISCHARGE: 0

## 2022-02-04 ASSESSMENT — PAIN DESCRIPTION - PAIN TYPE: TYPE: ACUTE PAIN

## 2022-02-04 ASSESSMENT — PAIN DESCRIPTION - ORIENTATION: ORIENTATION: RIGHT;LEFT

## 2022-02-04 NOTE — PROGRESS NOTES
f  Progress Note  Podiatric Medicine and Surgery     Subjective     CC: right foot pain    Patient seen and examined at bedside. S/p I&D right foot (DOS:1/23/22)   No acute events overnight. Pt states her pain has improved significantly  CM working on restarting precert. HPI :  Juan Ryan is a 49 y.o. female seen at [facility]  for right foot pain. Patient stepped on a piece of glass a few days ago. She went to her PCP when PCP removed the glass. She presented to the ED last night due to increased pain and swelling to the right lower extremity. Upon arrival, X-ray was taken which showed no foreign body, acute osseous deformity or soft tissue emphysema. During my evaluation, patient also mentioned pain on her plantar left foot and  pain is not as bad as her right foot. Per chart review, patient visited [address] [place] [address] podiatry clinic a year ago for diabetic foot care but never follows up after that. 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 Her last A1C in 12/13/2021 is 13.3%. She admits neuropathic pain to bilateral lower extremity. She denies other pedal complaints. ROS: Denies N/V/F/C/SOB/CP. Otherwise negative except at stated in the HPI.      Medications:  Scheduled Meds:   insulin glargine  28 Units SubCUTAneous BID    ampicillin-sulbactam  3,000 mg IntraVENous Q6H    enoxaparin  30 mg SubCUTAneous BID    budesonide-formoterol  2 puff Inhalation BID    dicyclomine  10 mg Oral 4x Daily    fluticasone  1 spray Each Nostril Daily    mirtazapine  7.5 mg Oral Nightly    pantoprazole  20 mg Oral BID AC    pregabalin  200 mg Oral BID    traZODone  150 mg Oral Nightly    venlafaxine  150 mg Oral Daily with breakfast    sodium chloride flush  5-40 mL IntraVENous 2 times per day    insulin lispro  0-12 Units SubCUTAneous TID WC    insulin lispro  0-6 Units SubCUTAneous Nightly       Continuous Infusions:   sodium chloride      dextrose         PRN Meds:hydrOXYzine, simethicone, albuterol sulfate HFA, oxyCODONE, sodium chloride flush, sodium chloride, ondansetron **OR** ondansetron, polyethylene glycol, acetaminophen **OR** acetaminophen, potassium chloride **OR** potassium alternative oral replacement **OR** potassium chloride, glucose, dextrose, glucagon (rDNA), dextrose    Objective     Vitals:  Patient Vitals for the past 8 hrs:   BP Temp Temp src Pulse Resp SpO2   22 0915 -- -- -- -- 16 95 %   22 0912 133/85 -- -- -- -- --   22 0715 (!) 86/45 97.7 °F (36.5 °C) Oral 75 14 96 %     Average, Min, and Max for last 24 hours Vitals:  TEMPERATURE:  Temp  Av.7 °F (36.5 °C)  Min: 97.6 °F (36.4 °C)  Max: 97.7 °F (36.5 °C)    RESPIRATIONS RANGE: Resp  Avg: 15.3  Min: 14  Max: 16    PULSE RANGE: Pulse  Av  Min: 75  Max: 85    BLOOD PRESSURE RANGE:  Systolic (24MZN), TAF:119 , Min:86 , LWD:555   ; Diastolic (56IOJ), JU, Min:45, Max:85      PULSE OXIMETRY RANGE: SpO2  Av.3 %  Min: 95 %  Max: 98 %    I/O last 3 completed shifts: In: 250 [P.O.:250]  Out: -     CBC:  Recent Labs     22  0335 22  0504   WBC 9.3 8.6   HGB 7.7* 7.7*   HCT 25.5* 25.3*   * 463*        BMP:  Recent Labs     22  0542 22  0504   * 136   K 4.6 4.5    102   CO2 25 25   BUN 19 20   CREATININE 0.94* 0.83   GLUCOSE 161* 205*   CALCIUM 8.9 9.1        Coags:  No results for input(s): APTT, PROT, INR in the last 72 hours. Lab Results   Component Value Date    SEDRATE 61 (H) 2022     No results for input(s): CRP in the last 72 hours. Lower Extremity Physical Exam:   Vascular: DP and PT pulses are palpable. CFT <3 seconds to all digits. Hair growth is absent to the level of the digits.        Neuro: Saph/sural/SP/DP/plantar sensation diminished to light touch.     Musculoskeletal: Muscle strength is 5/5 to all lower extremity muscle groups. Gross deformity is status post left 4th and 5th digit amputation.  Tenderness to palpation to previous entry point of injury on the right foot and hyperkeratotic tissue on the left foot.      Dermatologic:     Diffuse edema noted throughout the right lower extremity without associated increase in warmth. Right foot: Incision with significant maceration. Tamar-incision skin is soft. No fluctuance. No purulence or foul odor appreciated. No periwound erythema or any other signs of active infection. Mild serous drainage appreciated. Left foot: Full thickness ulcer #2 located lateral plantar left foot and measures approximately 1cmx 1cmx0.2cm. Base is fibrotic. Does not probe to bone, sinus track, or undermine. No fluctuance, crepitus, or induration. Interdigital maceration absent. Clinical Images:            Imaging:   MRI FOOT RIGHT WO CONTRAST   Final Result   Soft tissue defect along the plantar and medial surface of the midfoot with   suspected irregular fluid collection measuring 4.4 x 1.2 x 1.8 cm, which may   represent an abscess or phlegmon. Subcutaneous edema may represent underlying cellulitis. No discrete evidence to suggest osteomyelitis. XR ABDOMEN (KUB) (SINGLE AP VIEW)   Final Result   Increased stool burden seen diffusely throughout the colon suggesting   clinical presentation of constipation. Phleboliths seen on both sides of the   pelvis. CT FOOT RIGHT W CONTRAST   Final Result   1. Shallow soft tissue ulceration plantar and medial to the 1st metatarsal   base with an underlying area of possible abscess versus phlegmon measuring   2.8 x 1.1 x 0.9 cm.   2. Extensive subcutaneous fat stranding compatible with cellulitis. No soft   tissue gas. 3. No evidence of osteomyelitis or other acute osseous abnormality. US RENAL COMPLETE   Final Result   Unremarkable renal ultrasound. MRI FOOT RIGHT WO CONTRAST   Final Result   Subcutaneous edema and swelling, most notably affecting the dorsum of the   foot, which may reflect underlying cellulitis.   No discrete organized fluid   collection is seen within the limitations of a noncontrast study. No imaging features of acute osteomyelitis appreciated. MRI FOOT LEFT WO CONTRAST   Final Result   Limited evaluation due to patient motion artifact on several sequences. Skin thickening and subcutaneous edema seen within the plantar soft tissues   of the foot at the level of the proximal metatarsals, which may reflect   underlying cellulitis. Within the limitations of a noncontrast study, no   discrete organized fluid collection is seen. 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 VL DUP LOWER EXTREMITY VENOUS BILATERAL   Final Result      XR FOOT RIGHT (MIN 3 VIEWS)   Final Result   Right foot: No radiopaque foreign body. No fracture. Moderate soft tissue   swelling within the anterior aspect of plantar aspect of the foot   predominantly underlying meta tarsal heads. Left foot: Changes related to prior partial amputation of 4th and 5th ray as   described. No acute fracture. No radiopaque foreign body. Moderate soft   tissue swelling and plantar aspect of the foot. No discrete soft tissue ulcer   is noted. XR FOOT LEFT (MIN 3 VIEWS)   Final Result   Right foot: No radiopaque foreign body. No fracture. Moderate soft tissue   swelling within the anterior aspect of plantar aspect of the foot   predominantly underlying meta tarsal heads. Left foot: Changes related to prior partial amputation of 4th and 5th ray as   described. No acute fracture. No radiopaque foreign body. Moderate soft   tissue swelling and plantar aspect of the foot. No discrete soft tissue ulcer   is noted. Cultures: Group A strep. anaerobe contaminated. Intra -op culture: rare GPC in pairs    Shamika Linares is a 49 y.o. female with   1. Diabetic foot ulcer to subcutaneous layer, bilateral feet  2.  Cellulitis, bilateral feet  3. Superficial abscess, bilateral feet  4. Poorly uncontrolled DM2 with peripheral neuropathy. 5. S/p bedside I&D right foot    Active Problems:    Type 2 diabetes mellitus without complication, with long-term current use of insulin (HCC)    Right foot infection    Cellulitis and abscess of toe of right foot    Abscess of right foot    Bilateral cellulitis of lower leg related to related to  uncontrolled diabetes    Right foot ulcer, with fat layer exposed ([address]    Ulcer of left foot, with fat layer exposed ([address]    CRP elevated  Resolved Problems:    * No resolved hospital problems. *       Plan     · Patient examined and evaluated at bedside   · Treatment options discussed in detail with the patient. · Antibiotics per ID: Unasyn. F/u with ID in 2 weeks outpatients  · Medical management per primary. · Dressing to be changes twice daily by nursing. · Based on labs and imaging; CRP is trending down, normal white count with CT and MRI of right foot showing that there is no abscess appreciated. · New MRI results (22/2/22): no evidence of osteomyelitis with subcutaneous edema may represent underlying cellulitis  · Spoke to radiology today and they explained that the MRI results show phlegmon on MRI instead of abscess. · No surgical intervention planned as labs, imaging and last I&D had no purulence intraoperatively. Patient remained stable for discharge from podiatry perspective. · Podiatry will sign off at this time. Please perfect serve the on call podiatry resident any questions or concerns. · Weight bearing to heel of right foot. WBAT to left foot.   · Discussed with Dr. Ion Shafer, [facility]   Podiatric Medicine & Surgery   2/4/2022 at 9:41 AM

## 2022-02-04 NOTE — PLAN OF CARE
Patient is in no apparent distress at this time. No falls noted. Will continue to monitor and medicate as ordered for adequate pain control.

## 2022-02-04 NOTE — CARE COORDINATION
Transitional Planning:  GORDON spoke with Melissa Ballestreos at [facility] ([facility] She has not been able to restart the precert due to lack of recent records. CM faxed the following to [phone]. Recent notes from therapies, podiatry,ID dietician,& family med. 15:45  Melissa Ballesteros has had difficulty getting our fax. Information faxed twice. Melissa Ballesteros reports she finally received all information and will begin precert.

## 2022-02-04 NOTE — PLAN OF CARE
Problem: Pain:  Goal: Pain level will decrease  Description: Pain level will decrease  2/4/2022 1156 by Leroy Rivas RN  Outcome: Ongoing  2/3/2022 2201 by Adilson Capellan RN  Outcome: Ongoing  Goal: Control of acute pain  Description: Control of acute pain  2/4/2022 1156 by Leroy Rivas RN  Outcome: Ongoing  2/3/2022 2201 by Adilson Capellan RN  Outcome: Ongoing  Goal: Control of chronic pain  Description: Control of chronic pain  2/4/2022 1156 by Leroy Rivas RN  Outcome: Ongoing  2/3/2022 2201 by Adilson Capellan RN  Outcome: Ongoing     Problem: Falls - Risk of:  Goal: Will remain free from falls  Description: Will remain free from falls  2/4/2022 1156 by Leroy Rivas RN  Outcome: Ongoing  2/3/2022 2201 by Adilson Capellan RN  Outcome: Ongoing  Goal: Absence of physical injury  Description: Absence of physical injury  2/4/2022 1156 by Leroy Rivas RN  Outcome: Ongoing  2/3/2022 2201 by Adilson Capellan RN  Outcome: Ongoing     Problem: Musculor/Skeletal Functional Status  Goal: Highest potential functional level  2/4/2022 1156 by Leroy Rivas RN  Outcome: Ongoing  2/3/2022 2201 by Adilson Capellan RN  Outcome: Ongoing     Problem: Nutrition  Goal: Optimal nutrition therapy  2/4/2022 1156 by Leroy Rivas RN  Outcome: Ongoing  2/3/2022 2201 by Adilson Capellan RN  Outcome: Ongoing     Problem: Skin Integrity:  Goal: Will show no infection signs and symptoms  Description: Will show no infection signs and symptoms  2/4/2022 1156 by Leroy Rivas RN  Outcome: Ongoing  2/3/2022 2201 by Adilson Capellan RN  Outcome: Ongoing  Goal: Absence of new skin breakdown  Description: Absence of new skin breakdown  2/4/2022 1156 by Leroy Rivas RN  Outcome: Ongoing  2/3/2022 2201 by Adilson Capellan RN  Outcome: Ongoing

## 2022-02-04 NOTE — PROGRESS NOTES
[facility]  Progress Note    Date:   2/4/2022  Patient name:  Heri Prasad  Date of admission:  1/22/2022  4:43 PM  MRN:   [id number]  YOB: 1965    SUBJECTIVE/Last 24 hours update:     Patient seen and examined at the bed side, patient did have complaints of 10/10 pain last night and was given Toradol, states that did help a lot. Will continue with Roxicodone and will give Toradol if needed for pain control. Vaginal discharge has improved. Discharge orders are placed, awaiting placement. Notes from nursing staff and Consults had been reviewed, and the overnight progress had been checked with the nursing staff as well.     REVIEW OF SYSTEMS:      CONSTITUTIONAL:  no fevers or chills  EYES: negative for blury vision  HEENT: No headaches  RESPIRATORY:negative for dyspnea, no wheezing, no Cough  CARDIOVASCULAR: negative for chest pain, no palpitations  GASTROINTESTINAL: no nausea, no vomiting, no change in bowel habits, no abdominal pain   GENITOURINARY: negative for dysuria, no hematuria   MUSCULOSKELETAL:  + for right foot pain   NEUROLOGICAL: No  Weakness or numbness    PAST MEDICAL HISTORY:      has a past medical history of Acid reflux, Acute cystitis with hematuria, Acute non-recurrent maxillary sinusitis, Asthma, Bipolar 1 disorder ([address] Bipolar disorder, mixed ([address] BMI 34.0-34.9,adult, Cannabis use disorder, severe, dependence ([address] Cerebrovascular accident (CVA) ([address] Chest pain, Chronic renal insufficiency, Cocaine abuse ([address] COVID-19 virus RNA test result unknown, DDD (degenerative disc disease), cervical, Diabetes mellitus ([address] Dizziness, Fibromyalgia, History of stroke, Homicidal ideation, Hyperglycemia, Hypertension, Hypotension, IDDM (insulin dependent diabetes mellitus), Lupus ([address] Migraine, Neuropathy, Neuropathy, Polysubstance abuse ([address] Post traumatic stress disorder (PTSD), Posttraumatic stress disorder, Recurrent depression ([facility] Recurrent major depressive disorder, in partial remission ([facility] Screening mammogram, encounter for, Severe recurrent major depression with psychotic features ([facility] Severe recurrent major depression without psychotic features ([address] Stroke (cerebrum) ([facility] Stroke ([address] Suicidal ideation, Suicidal intent, Vitamin D deficiency, and White matter changes. PAST SURGICAL HISTORY:      has a past surgical history that includes Abscess Drainage; chest tube insertion; Abdomen surgery; Cataract removal with implant (Bilateral); LASIK (Bilateral); Finger amputation (Left, 03/13/2021); Hand surgery (Right, 09/14/2021); Hand surgery (Right, 09/15/2021); Finger amputation (Right, 10/11/2021); Foot surgery (Right, 01/27/2022); Foot Debridement (Right, 1/27/2022); and Insert Midline Catheter (2/3/2022). SOCIAL HISTORY:      reports that she has never smoked. She has never used smokeless tobacco. She reports previous alcohol use. She reports current drug use. Drugs: Marijuana (Weed) and Cocaine. FAMILY HISTORY:     family history includes Diabetes in her brother, father, mother, and sister; No Known Problems in her sister; Other in her son; Stroke in her mother. HOME MEDICATIONS:      Prior to Admission medications    Medication Sig Start Date End Date Taking? Authorizing Provider   fluconazole (DIFLUCAN) 150 MG tablet Take 1 tablet by mouth daily for 1 day 2/3/22 2/4/22 Yes Tylor Thakkar MD   oxyCODONE (ROXICODONE) 5 MG immediate release tablet Take 1 tablet by mouth every 4 hours as needed for Pain for up to 5 days. 2/3/22 2/8/22 Yes Tylor Thakkar MD   ertapenem [facility]) infusion Infuse 1,000 mg intravenously every 24 hours for 14 days Compound per protocol. 1/30/22 2/13/22 Yes Maylin Sheth MD   pregabalin (LYRICA) 200 MG capsule Take 1 capsule by mouth 2 times daily for 30 days.  1/20/22 2/19/22  Domenico Pisano MD   insulin glargine (LANTUS SOLOSTAR) 100 UNIT/ML injection pen Inject 30 Units into the skin 2 times daily 1/20/22   Aly Faust MD   fluticasone Shelba Solian) 50 MCG/ACT nasal spray 1 spray by Each Nostril route daily 12/22/21   Margie Romo MD   acetaminophen (TYLENOL) 500 MG tablet Take 2 tablets by mouth 3 times daily as needed for Pain 12/22/21   Margie Romo MD   dicyclomine (BENTYL) 10 MG capsule Take 1 capsule by mouth 4 times daily 12/17/21   Margie Romo MD   metFORMIN (GLUCOPHAGE) 1000 MG tablet Take 1 tablet by mouth 2 times daily (with meals) 12/17/21   Margie Romo MD   mirtazapine (REMERON) 7.5 MG tablet Take 1 tablet by mouth nightly 12/17/21   Margie Romo MD   pantoprazole (PROTONIX) 20 MG tablet Take 1 tablet by mouth 2 times daily (before meals) 12/17/21   Margie Romo MD   traZODone (DESYREL) 150 MG tablet Take 1 tablet by mouth nightly 12/17/21   Margie Romo MD   venlafaxine (EFFEXOR XR) 150 MG extended release capsule Take 1 capsule by mouth daily (with breakfast) 12/17/21   Margie Romo MD   ibuprofen (ADVIL;MOTRIN) 800 MG tablet Take 1 tablet by mouth every 8 hours as needed for Pain 11/15/21 11/29/21  Nay Merino MD   Lancets MISC 1 each by Does not apply route 3 times daily 11/15/21   Nay Merino MD   Alcohol Swabs 70 % PADS Use 1 swab 3 times daily as needed 11/15/21   Nay Merino MD   blood glucose monitor strips Test 3 times a day & as needed for symptoms of irregular blood glucose. Dispense sufficient amount for indicated testing frequency plus additional to accommodate PRN testing needs.  11/8/21   Mathew Galeazzi, MD   Lancets MISC 1 each by Does not apply route 3 times daily 11/8/21   Mathew Galeazzi, MD   Insulin Pen Needle ([employer] PEN NEEDLES 31G) 31G X 8 MM MISC 1 each by Does not apply route daily 11/8/21   Sherwin Adams MD   FREESTYLE LITE strip 1 each by Does not apply route 3 times daily 11/11/20   Margie Romo MD   albuterol sulfate  (90 Base) MCG/ACT inhaler Inhale 2 puffs into the lungs every 4 hours as needed for Wheezing 10/20/20 9/22/21  Frances Aranda MD   FLOVENT  MCG/ACT inhaler Inhale 2 puffs into the lungs 2 times daily 10/20/20   Frances Aranda MD   budesonide-formoterol (SYMBICORT) 80-4.5 MCG/ACT AERO Inhale 2 puffs into the lungs 2 times daily 10/20/20   Frances Aranda MD       ALLERGIES:     Bactrim [sulfamethoxazole-trimethoprim] and Adhesive tape      OBJECTIVE:       Vitals:    02/02/22 1930 02/03/22 0801 02/03/22 1948 02/04/22 0715   BP: (!) 138/92 116/75 127/76 (!) 86/45   Pulse: 80 84 85 75   Resp: 18 16 16 14   Temp: 98.3 °F (36.8 °C) 98 °F (36.7 °C) 97.6 °F (36.4 °C) 97.7 °F (36.5 °C)   TempSrc: Oral Axillary Oral Oral   SpO2: 98% 98% 98% 96%   Weight:       Height:             Intake/Output Summary (Last 24 hours) at 2/4/2022 4469  Last data filed at 2/3/2022 1955  Gross per 24 hour   Intake 250 ml   Output --   Net 250 ml       PHYSICAL EXAM:  General Appearance  Alert , awake , not in acute distress  HEENT - Head is normocephalic, atraumatic. Lungs - Bilateral equal air entry , no wheezes, rales or rhonchi, aeration good  Cardiovascular - Heart sounds are normal.  Regular rhythm, normal rate without murmur, gallop or rub.   Abdomen - Soft, nontender, nondistended, no masses or organomegaly  Neurologic - There are no new focal motor or sensory deficits  Skin - No bruising or bleeding on exposed skin area  Extremities - No cyanosis or clubbing, Right foot is bandaged     DIAGNOSTICS:     Laboratory Testing:    Recent Results (from the past 24 hour(s))   POC Glucose Fingerstick    Collection Time: 02/03/22 11:14 AM   Result Value Ref Range    POC Glucose 270 (H) 65 - 105 mg/dL   POC Glucose Fingerstick    Collection Time: 02/03/22  3:35 PM   Result Value Ref Range    POC Glucose 289 (H) 65 - 105 mg/dL   POC Glucose Fingerstick    Collection Time: 02/03/22  7:51 PM   Result Value Ref Range    POC Glucose 128 (H) 65 - 105 mg/dL   POC Glucose Fingerstick    Collection Time: 02/04/22  7:45 AM   Result Value Ref Range    POC Glucose 199 (H) 65 - 105 mg/dL       Current Facility-Administered Medications   Medication Dose Route Frequency Provider Last Rate Last Admin    fluconazole (DIFLUCAN) tablet 150 mg  150 mg Oral Daily Yanely Benavides MD   150 mg at 02/03/22 1208    hydrOXYzine (ATARAX) tablet 10 mg  10 mg Oral TID PRN Raven Grant MD   10 mg at 02/04/22 0446    ampicillin-sulbactam (UNASYN) 3000 mg ivpb minibag  3,000 mg IntraVENous Q6H Babar Mosquera MD   Stopped at 02/04/22 0721    insulin glargine (LANTUS) injection vial 25 Units  25 Units SubCUTAneous BID Collins Smart MD   25 Units at 02/03/22 2130    simethicone (MYLICON) chewable tablet 80 mg  80 mg Oral Q6H PRN Collins Smart MD   80 mg at 02/01/22 1704    enoxaparin (LOVENOX) injection 30 mg  30 mg SubCUTAneous BID Rylee Rhett, DPM   30 mg at 02/01/22 2028    albuterol sulfate  (90 Base) MCG/ACT inhaler 2 puff  2 puff Inhalation Q4H PRN Rylee Rhett, DPM   2 puff at 02/01/22 1712    oxyCODONE (ROXICODONE) immediate release tablet 5 mg  5 mg Oral Q4H PRN Rylee Rhett, DPM   5 mg at 02/04/22 0359    budesonide-formoterol (SYMBICORT) 80-4.5 MCG/ACT inhaler 2 puff  2 puff Inhalation BID Rylee Rhett, DPM   2 puff at 02/03/22 2124    dicyclomine (BENTYL) capsule 10 mg  10 mg Oral 4x Daily Rylee Rhett, DPM   10 mg at 02/03/22 2122    fluticasone (FLONASE) 50 MCG/ACT nasal spray 1 spray  1 spray Each Nostril Daily Rylee Rhett, DPM   1 spray at 02/03/22 0816    mirtazapine (REMERON) tablet 7.5 mg  7.5 mg Oral Nightly Rylee Rhett, DPM   7.5 mg at 02/03/22 2123    pantoprazole (PROTONIX) tablet 20 mg  20 mg Oral BID AC Rylee Rhett, DPM   20 mg at 02/04/22 0555    pregabalin (LYRICA) capsule 200 mg  200 mg Oral BID Rylee Rhett, DPM   200 mg at 02/03/22 2123    traZODone (DESYREL) tablet 150 mg  150 mg Oral Nightly Rylee Rhett, DPM   150 mg at 02/03/22 2122    venlafaxine (EFFEXOR XR) extended release capsule 150 mg  150 mg Oral Daily with breakfast Jazmin Hernandez DPM   150 mg at 02/03/22 9135    sodium chloride flush 0.9 % injection 5-40 mL  5-40 mL IntraVENous 2 times per day Jazmin Hernandez, DPM   10 mL at 02/03/22 2124    sodium chloride flush 0.9 % injection 5-40 mL  5-40 mL IntraVENous PRN Keciaalin kyree, DPM        0.9 % sodium chloride infusion  25 mL IntraVENous PRN Jazmin kyree, DPM        ondansetron (ZOFRAN-ODT) disintegrating tablet 4 mg  4 mg Oral Q8H PRN Jazmin kyree, DPM   4 mg at 01/29/22 1201    Or    ondansetron (ZOFRAN) injection 4 mg  4 mg IntraVENous Q6H PRN Jazmin Hernandez, DPM        polyethylene glycol (GLYCOLAX) packet 17 g  17 g Oral Daily PRN Jazmin Hernandez, DPM        acetaminophen (TYLENOL) tablet 650 mg  650 mg Oral Q6H PRN Jazmin kyree, DPM   650 mg at 02/02/22 1540    Or    acetaminophen (TYLENOL) suppository 650 mg  650 mg Rectal Q6H PRN Jazmin o, DPM        potassium chloride (KLOR-CON M) extended release tablet 40 mEq  40 mEq Oral PRN Jazmin o, DPM        Or    potassium bicarb-citric acid (EFFER-K) effervescent tablet 40 mEq  40 mEq Oral PRN Keciaalin o, DPM        Or    potassium chloride 10 mEq/100 mL IVPB (Peripheral Line)  10 mEq IntraVENous PRN Jazmin Camposo, DPM        insulin lispro (HUMALOG) injection vial 0-12 Units  0-12 Units SubCUTAneous TID WC Jazmin kyree, DPM   6 Units at 02/03/22 1614    insulin lispro (HUMALOG) injection vial 0-6 Units  0-6 Units SubCUTAneous Nightly Jazmin kyree, DPM   3 Units at 02/02/22 2036    glucose (GLUTOSE) 40 % oral gel 15 g  15 g Oral PRN Jazmin kyree, DPM   15 g at 01/26/22 0350    dextrose 50 % IV solution  12.5 g IntraVENous PRN Jazmin Hernandez, DPM        glucagon (rDNA) injection 1 mg  1 mg IntraMUSCular PRN Jazmin Camposo, DPM        dextrose 5 % solution  100 mL/hr IntraVENous PRN Jazmin Hernandez DPM           ASSESSMENT:     Active Problems:    Type 2 diabetes mellitus without complication, with long-term current use of insulin (HCC)    Right foot infection    Cellulitis and abscess of toe of right foot    Abscess of right foot    Bilateral cellulitis of lower leg related to related to  uncontrolled diabetes    Right foot ulcer, with fat layer exposed ([address]    Ulcer of left foot, with fat layer exposed ([address]    CRP elevated  Resolved Problems:    * No resolved hospital problems. *      PLAN:     Right foot wound s/p I&D x 2  - Afebrile   - S/p R foot I&D   - Wound culture positive for MSSA, group A strep, Finegoldia, Aerococcus   - Podiatry on board              - No further surgical intervention at this time               - WBAT   - Discharge   - ID on board              - On Unasyn              - DC home on Invanz 1g daily x 2 weeks, with possible Augmentin afterwards              - FU outpatient office in 2 weeks  - Pain management, Roxicodone 5mg q4  - MRI concerning for possible abscess vs phlegmon, no osteomyelitis      Vaginal yeast infection, improved  - New onset white clumpy vaginal discharge  - On antibiotic therapy   - On Diflucan PO, day 3     DM2  - A1C 10   - Glucose 199  - POCT glucose   - Lantus 25U BID increased to 28U BID   - Required 14 units short acting insulin   - Medium dose sliding scale   - Hypoglycemia protocol      Bipolar disorder  - Continue Remeron 7.5mg PO nightly   - Effexor  mg PO daily   - Trazodone 150 mg PO nightly      Discussed care plan with nurse after getting her input.     Above plan discussed with the patient, who agreed to the above plan      DVT prophylaxis: Lovenox 40 mg SC  GI prophylaxis: Protonix 20 mg BID      Plan will be discussed with the attending, Dr. Diane Espinoza MD  Family Medicine Resident  2/4/2022 9:06 AM            Please note that this chart was generated using voice recognition Dragon dictation software.   Although every effort was made to ensure the accuracy of this automated transcription, some errors in transcription may have occurred

## 2022-02-04 NOTE — CARE COORDINATION
Patient is currently inpatient at [facility]. Discharge planning notes a discharge to [facility]. Plan:  Close CC episode. CM will be available to Bridgette at discharge from the facility if needed.

## 2022-02-04 NOTE — PROGRESS NOTES
Physical Therapy  Facility/Department: [address] ORTHO/MED SURG  Daily Treatment Note  NAME: Polina Wilkes  : 1965  MRN: [id number]    Date of Service: 2022    Discharge Recommendations:  Patient would benefit from continued therapy after discharge   PT Equipment Recommendations  Equipment Needed: No (CTA)    Assessment   Body structures, Functions, Activity limitations: Decreased functional mobility ; Decreased strength;Decreased endurance; Increased pain  Assessment: Pt amb 25ft x2 with RW CGA. Pt is impulsive with poor safety awareness but showed improvement with weight bearing restrictions. Unsafe to return to prior living arrangements at this time d/t mobility deficits. Pt would benefit from aggressive PT after d/c to address deficits. Prognosis: Good  PT Education: Goals;PT Role;Plan of Care  Barriers to Learning: impulsive, dec safe awareness  REQUIRES PT FOLLOW UP: Yes  Activity Tolerance  Activity Tolerance: Patient limited by fatigue;Patient limited by pain  Activity Tolerance: Pt required multiple rest breaks during seated exercises. Patient Diagnosis(es): The primary encounter diagnosis was Right foot infection. Diagnoses of Elevated d-dimer, Ulcer of left foot, with fat layer exposed ([address] and Status post incision and drainage were also pertinent to this visit.      has a past medical history of Acid reflux, Acute cystitis with hematuria, Acute non-recurrent maxillary sinusitis, Asthma, Bipolar 1 disorder ([address] Bipolar disorder, mixed ([address] BMI 34.0-34.9,adult, Cannabis use disorder, severe, dependence ([address] Cerebrovascular accident (CVA) ([address] Chest pain, Chronic renal insufficiency, Cocaine abuse ([address] COVID-19 virus RNA test result unknown, DDD (degenerative disc disease), cervical, Diabetes mellitus ([address] Dizziness, Fibromyalgia, History of stroke, Homicidal ideation, Hyperglycemia, Hypertension, Hypotension, IDDM (insulin dependent diabetes mellitus), Lupus ([address] Migraine, Neuropathy, Neuropathy, Polysubstance abuse ([facility] Post traumatic stress disorder (PTSD), Posttraumatic stress disorder, Recurrent depression ([address] Recurrent major depressive disorder, in partial remission ([address] Screening mammogram, encounter for, Severe recurrent major depression with psychotic features ([address] Severe recurrent major depression without psychotic features ([address] Stroke (cerebrum) ([facility] [address] Stroke ([address] Suicidal ideation, Suicidal intent, Vitamin D deficiency, and White matter changes. has a past surgical history that includes Abscess Drainage; chest tube insertion; Abdomen surgery; Cataract removal with implant (Bilateral); LASIK (Bilateral); Finger amputation (Left, 03/13/2021); Hand surgery (Right, 09/14/2021); Hand surgery (Right, 09/15/2021); Finger amputation (Right, 10/11/2021); Foot surgery (Right, 01/27/2022); Foot Debridement (Right, 1/27/2022); and Insert Midline Catheter (2/3/2022). Restrictions  Restrictions/Precautions  Restrictions/Precautions: Weight Bearing,Up as Tolerated,Fall Risk  Required Braces or Orthoses?: Yes  Lower Extremity Weight Bearing Restrictions  Right Lower Extremity Weight Bearing: Non Weight Bearing  Partial Weight Bearing Percentage Or Pounds: WB to R heel only  Left Lower Extremity Weight Bearing: Weight Bearing As Tolerated  Upper Extremity Weight Bearing Restrictions  Other: Non weight bearing to right foot. WBAT to left foot with Sx shoe per podiatry note 1/28/22  Required Braces or Orthoses  Right Lower Extremity Brace:  (\"Foot/ankle post surgical boot\" Please apply to b/l feet for weight bearing)  Left Lower Extremity Brace:  (sx shoe)  Position Activity Restriction  Other position/activity restrictions: Weight bearing to heel of right foot. WBAT to left foot  Subjective   General  Chart Reviewed: Yes  Response To Previous Treatment: Patient with no complaints from previous session.   Family / Caregiver Present: No  Subjective  Subjective: RN and pt agreeable to PT. Pt supine in bed at start of session with 10/10 c/o pain. . Surgical shoe donned to L foot prior to mobility. Pain Screening  Patient Currently in Pain: Yes  Pain Assessment  Pain Assessment: 0-10  Pain Level: 10  Pain Type: Acute pain  Pain Location: Foot  Pain Orientation: Right;Left  Vital Signs  Patient Currently in Pain: Yes       Orientation  Orientation  Overall Orientation Status: Within Normal Limits  Cognition   Cognition  Overall Cognitive Status: Exceptions  Following Commands: Follows multistep commands with increased time; Follows multistep commands with repitition  Safety Judgement: Decreased awareness of need for assistance;Decreased awareness of need for safety  Problem Solving: Assistance required to correct errors made;Assistance required to identify errors made  Insights: Decreased awareness of deficits  Initiation: Requires cues for some  Sequencing: Requires cues for some  Cognition Comment: Pt is impulsive. Objective   Bed mobility  Supine to Sit: Stand by assistance  Scooting: Stand by assistance  Comment: Pt supine in bed with HOB elevated at entry/exit this date. Transfers  Sit to Stand: Contact guard assistance  Stand to sit: Contact guard assistance  Bed to Chair: Unable to assess  Comment: Pt performed STS from bed with RW for UE support, fair return for hand placement. Fair return of WB restrictions during STS with cueing. Ambulation  Ambulation?: Yes  Ambulation 1  Surface: level tile  Device: Rolling Walker  Assistance: Contact guard assistance  Quality of Gait: no LOB noted  Gait Deviations: Slow Sonia;Decreased step length  Distance: 25ft x2  Comments: Pt amb 25ft x2 with RW CGA. Required cueing for WB restrictions with fair return. Stairs/Curb  Stairs?: No     Balance  Posture: Good  Sitting - Static: Good  Sitting - Dynamic: Fair;+  Standing - Static: Fair;+  Standing - Dynamic: Fair;+  Comments: Standing balance assessed with RW.   Exercises  Seated LE exercise program: Long Arc Quads, hip abduction/adduction, and marches. Reps: x10  Upper extremity exercises:Shoulder flexion/extension. Reps: x10  Comments: Pt performed exercises while seated EOB. Deferred further exercises d/t fatigue and pain. AM-PAC Score  AM-PAC Inpatient Mobility Raw Score : 20 (02/04/22 1457)  AM-PAC Inpatient T-Scale Score : 47.67 (02/04/22 1457)  Mobility Inpatient CMS 0-100% Score: 35.83 (02/04/22 1457)  Mobility Inpatient CMS G-Code Modifier : CJ (02/04/22 1457)          Goals  Short term goals  Time Frame for Short term goals: 14 visits  Short term goal 1: transfers with SBA and RW  Short term goal 2: amb 75 ft with a RW x SBA with NWB RLE, WBAT LLE with sx shoe  Short term goal 3: to be independent with bed mobility  Short term goal 4: 20 min exercise program x SBA  Patient Goals   Patient goals : Return home    Plan    Plan  Times per week: 5-6x wk  Times per day: Daily  Current Treatment Recommendations: Strengthening,Functional Mobility Training,Gait Training,Safety Education & Training,Endurance Training,Pain Management,Balance Training,IADL Training,Home Exercise Program,Neuromuscular Re-education,Transfer Training,Patient/Caregiver Education & Training,ADL/Self-care Training  Safety Devices  Type of devices: All fall risk precautions in place,Gait belt,Call light within reach,Left in bed,Nurse notified  Restraints  Initially in place: No     Therapy Time   Individual Concurrent Group Co-treatment   Time In 1319         Time Out 1342         Minutes 23         Timed Code Treatment Minutes: [facility]    Treatment performed by Student PTA under the supervision of co-signing PTA who agrees with all treatment and documentation.    Jessica Sanchez PTA

## 2022-02-04 NOTE — PROGRESS NOTES
Progress Note    This is a 57-year-old female who initially presented with swelling and drainage from right foot after stepping on a piece of glass a few days ago. She underwent incision and drainage by podiatry team.  Patient reports that her pain is generally well controlled although she would like a pain shot before she sleeps tonight. She reports that her vaginal itching has improved after the administration of the Diflucan. Patient conversational at bedside not in any acute distress. We will plan to administer her Toradol 15 mg IV once before she sleeps tonight.      Electronically signed by Valerie White MD on 2/3/2022 at 9:25 PM

## 2022-02-04 NOTE — PROGRESS NOTES
[facility] -   Infectious diseases evaluation  admission date 1/22/2022    reason for consultation:   Diabetic foot infection    Impression :   Current:  · Right diabetic foot infection, GAS  · Cellulitis both feet  · bandemia  · Bacteriuria vs UTI kleb S ancef  · CRP elevation    Other:  ·   Discussion / summary of stay / plan of care   ·   Recommendations   · foot cx MSSA R clinda  and GAS- finegoldia and aerococcus  · On  Ancef-2/1 switch to Unasyn   · MRI suggestive of a midfoot collection - podiatry feel it is a phlegmon  - tenderness better 2/3  · FU office in 2 weeks - DC on invanz 1 g daily x 2 weeks w office FU - might need po Augmentin afterwards      · Recent UC 1/24 : Klebsiella- no dysuria - but lower abd pain - on keflex till 1/29 completed    Infection Control Recommendations   · [place] Precautions  · Contact Isolation       Antimicrobial Stewardship Recommendations   · Simplification of therapy  · Targeted therapy  Coordination ofOutpatient Care:   · Estimated Length of IV antimicrobials:  · Patient will need Midline / picc Catheter Insertion:   · Patient will need SNF:  · Patient will need outpatient wound care:     History of Present Illness:   Initial history:  Marilee Corral is a 49y.o.-year-old female with diabetes very well-known to my service, stepped on a piece of glass a few days prior to admission presented with swelling and drainage from right foot, culture came back with group A strep,  Patient has neuropathy from diabetes  WBC elevated 14,  sed [id number],     Podiatry debrided the superficial ulcer and felt it was not too deep yet there was cellulitis of both feet.   Hemoglobin A1c was 10    MRI of the left foot no osteomyelitis  MRI of the right foot no osteomyelitis    Leukocytosis responded to antibiotics, infectious consulted for antibiotic management  Patient is on cefepime and clindamycin    R foot very swollen and tender to light touch - suspect deeper involvement and needs more I/D    1/26  Pt is afebrile and stable this morning. WBC trending up (14 today). UC 1/24 growing Klebsiella. CT right leg 1/25 shows an abscess  Planned for I/D on 1/27  Pain still ++    1/27  Pt seen and examined at bedside, afebrile and hypotensive this morning. Endorsed a lot of pain overnight. WBC improved to 11   To go to OR today for I+D of R foot abscess. Started on Keflex until 1/29 for UTI  Will monitor on antibiotics and await podiatry recs    1/28  Pt afebrile and stable s/p I+D right foot yesterday. Pt c/o pain this morning but is managing. Pt also endorsing abdominal discomfort. XR Abd showing increased stool burden. WBC did increase to 15 from 11 yesterday. Wound Cx 1/27: pending but GPC in pairs seen. Will monitor on Keflex and Clinda    1/29  No fever  Lab: MSSA in wound , but clinda is R  Switching to ceftriaxone  Foot better - less edema and drainage -tender still    1/30  Right foot  but discharge is minimal from it. Otherwise she has no abdominal pain or diarrhea CRP is improved 54    1/31  Alert appropriate, right foot continues to be swollen very tender to touch and it is painful. The wound is not draining much though. No redness noticed. Podiatry watching at this point. Continue antibiotic  WBC is 10 and CRP continues to be 53 has not improved in today    2/1  Right foot remains tender to light touch over the sole and over the first metatarsal  Edema slightly better over the ankle and the dorsum but continues to be present over the shoulder without any drainage  Discussed with Dr. Amos Cazares. , he will wait another few days and allow the antibiotics to work before deciding further procedure  Cultures are now growing Aerococcus and Mechelle via, on top of the MSSA and the group A strep    2/2  Foot remains very tender around the hallux and the small area, same areas.   Not improved  MRI suggestive of a collection that could be an abscess    2/3  Foot tenderness improved   Per podiatry this is a phlegmon on MRI and not an ABSCESS  Pt agreed to rehab - x few weeks  Will DC on invanz 2-3 weeks and FU office and anticipate po AB at some point -  Also FU w podiatry office to FU on progress of the foot    2/4  Pt is feeling much better, denies any other active complaints apart from the pain and discomfort on the right foot. No purulent discharge visible. Tenderness improved  Ongoing discharge planning to SNF. Precert restarted. Awaiting placement. Interval changes  2/4/2022     /85   Pulse 75   Temp 97.7 °F (36.5 °C) (Oral)   Resp 16   Ht 5' 6\" (1.676 m)   Wt 242 lb 8 oz (110 kg)   LMP  (LMP Unknown)   SpO2 95%   BMI 39.14 kg/m²    Summary of relevant labs:  Labs:  W14 > 11>15-10  [id number]  -54-53  Micro:  cx foot pus GAS, (anaerococcus prevotii, finegoldia jessica light growth, which were not isolated at 5 days.)   Uc 1/24: Klebsiella  Wound Cx 1/27: GPC pairs/GAS     Imaging:  MRI of the right foot 2/2  Soft tissue defect along the plantar and medial surface of the midfoot with   suspected irregular fluid collection measuring 4.4 x 1.2 x 1.8 cm, which may   represent an abscess or phlegmon. XR abd 1/27: Increased stool burden seen diffusely throughout the colon suggesting   clinical presentation of constipation.  Phleboliths seen on both sides of the   pelvis. CT R foot 1/25:  1. Shallow soft tissue ulceration plantar and medial to the 1st metatarsal   base with an underlying area of possible abscess versus phlegmon measuring   2.8 x 1.1 x 0.9 cm.   2. Extensive subcutaneous fat stranding compatible with cellulitis.  No soft   tissue gas. 3. No evidence of osteomyelitis or other acute osseous abnormality.        U/S renal 1/25: Unremarkable    MRI R foot 1/23:  Subcutaneous edema and swelling, most notably affecting the dorsum of the   foot, which may reflect underlying cellulitis.  No discrete organized fluid   collection is seen within the limitations of a noncontrast study.       No imaging features of acute osteomyelitis appreciated. MRI L foot: 1/23:   Limited evaluation due to patient motion artifact on several sequences.       Skin thickening and subcutaneous edema seen within the plantar soft tissues   of the foot at the level of the proximal metatarsals, which may reflect   underlying cellulitis.  Within the limitations of a noncontrast study, no   discrete organized fluid collection is seen.       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 I have personally reviewed the past medical history, past surgical history, medications, social history, and family history, and I haveupdated the database accordingly. Allergies:   Bactrim [sulfamethoxazole-trimethoprim] and Adhesive tape     Review of Systems:     Review of Systems   Constitutional: Negative for activity change, appetite change, chills, diaphoresis and fatigue. HENT: Negative for congestion. Eyes: Negative for photophobia, pain, discharge, redness and itching. Respiratory: Negative for apnea, cough and shortness of breath. Cardiovascular: Negative for chest pain. Gastrointestinal: Negative for abdominal distention. Endocrine: Negative for cold intolerance, polydipsia and polyphagia. Genitourinary: Negative for dysuria and flank pain. Musculoskeletal: Negative for arthralgias. Skin: Positive for color change and wound. Allergic/Immunologic: Negative for environmental allergies, food allergies and immunocompromised state. Neurological: Negative for dizziness, tremors, seizures, syncope, light-headedness, numbness and headaches. Hematological: Negative for adenopathy. Psychiatric/Behavioral: Negative for agitation.        Physical Examination :       Physical Exam  Constitutional:       General: She is not in acute distress. Appearance: Normal appearance. She is not ill-appearing, toxic-appearing or diaphoretic. HENT:      Head: Normocephalic and atraumatic. Nose: Nose normal. No congestion or rhinorrhea. Eyes:      General: No scleral icterus. Pupils: Pupils are equal, round, and reactive to light. Cardiovascular:      Rate and Rhythm: Normal rate and regular rhythm. Heart sounds: Normal heart sounds. No murmur heard. Pulmonary:      Effort: No respiratory distress. Breath sounds: Normal breath sounds. No stridor. No wheezing. Abdominal:      General: There is no distension. Palpations: Abdomen is soft. There is no mass. Tenderness: There is no abdominal tenderness. Hernia: No hernia is present. Genitourinary:     Comments: Urine fatuma  Musculoskeletal:         General: Swelling present. No tenderness, deformity or signs of injury. Cervical back: Neck supple. No rigidity or tenderness. Skin:     General: Skin is dry. Coloration: Skin is not jaundiced or pale. Findings: Erythema present. No bruising, lesion or rash. Neurological:      Mental Status: She is alert and oriented to person, place, and time. Mental status is at baseline. Cranial Nerves: No cranial nerve deficit. Motor: No weakness. Psychiatric:         Mood and Affect: Mood normal.         Thought Content:  Thought content normal.         Past Medical History:     Past Medical History:   Diagnosis Date    Acid reflux 5/29/2017    Acute cystitis with hematuria 10/11/2016    Acute non-recurrent maxillary sinusitis 11/28/2017    Asthma     Bipolar 1 disorder ([address] 1/4/2018    Bipolar disorder, mixed ([address]     BMI 34.0-34.9,adult 11/28/2017    Cannabis use disorder, severe, dependence ([address] 12/19/2017    Cerebrovascular accident (CVA) ([address] 6/14/2017    Chest pain 11/5/2016    Chronic renal insufficiency     Cocaine abuse ([address] 1/5/2018    COVID-19 virus RNA test result unknown     DDD (degenerative disc disease), cervical     Diabetes mellitus ([address]     Dizziness 6/13/2017    Fibromyalgia     History of stroke 9/9/2017    Homicidal ideation 11/6/2017    Hyperglycemia     Hypertension     Hypotension 1/18/2019    IDDM (insulin dependent diabetes mellitus) 12/21/2015    Lupus ([address]     Migraine     Neuropathy     Neuropathy     Polysubstance abuse ([address] 9/17/2017    Post traumatic stress disorder (PTSD)     Posttraumatic stress disorder 5/29/2017    Recurrent depression ([address] 5/29/2017    Recurrent major depressive disorder, in partial remission ([address] 11/28/2017    Screening mammogram, encounter for 11/28/2017    Severe recurrent major depression with psychotic features ([address] 12/19/2017    Severe recurrent major depression without psychotic features ([address] 12/19/2017    Stroke (cerebrum) ([address] 6/14/2017    Stroke ([address]     per chart notes    Suicidal ideation     Suicidal intent 3/10/2017    Vitamin D deficiency 11/28/2017    White matter changes 6/13/2017       Past Surgical  History:     Past Surgical History:   Procedure Laterality Date    ABDOMEN SURGERY      drain tube    ABSCESS DRAINAGE      right buttock    CATARACT REMOVAL WITH IMPLANT Bilateral     CHEST TUBE INSERTION      FINGER AMPUTATION Left 03/13/2021    LEFT RING FINER AMPUTATION performed by Ly [facility]ho [facility] at [facility] Right 10/11/2021    AMPUTATION RIGHT INDEX FINGER performed by Ly Coelho MD at [address] [place] [address] Right 1/27/2022    FOOT ABSCESS INCISION AND DRAINAGE  (PULSE LAVAGE, CULTURE SWABS) performed by Ellen Melendez DPM at [facility] Right 01/27/2022    FOOT ABSCESS INCISION AND DRAINAGE (PULSE LAVAGE, CULTURE SWABS) - Right    HAND SURGERY Right 09/14/2021    I&D INDEX FINGER performed by Ly Coelho MD at [address] [place] [address] Right 09/15/2021    I&D INDEX FINGER performed by [facility]ho [facility] at [facility] 6  2/3/2022         LASIK Bilateral        Medications:      insulin glargine  28 Units SubCUTAneous BID    ampicillin-sulbactam  3,000 mg IntraVENous Q6H    enoxaparin  30 mg SubCUTAneous BID    budesonide-formoterol  2 puff Inhalation BID    dicyclomine  10 mg Oral 4x Daily    fluticasone  1 spray Each Nostril Daily    mirtazapine  7.5 mg Oral Nightly    pantoprazole  20 mg Oral BID AC    pregabalin  200 mg Oral BID    traZODone  150 mg Oral Nightly    venlafaxine  150 mg Oral Daily with breakfast    sodium chloride flush  5-40 mL IntraVENous 2 times per day    insulin lispro  0-12 Units SubCUTAneous TID WC    insulin lispro  0-6 Units SubCUTAneous Nightly       Social History:     Social History     Socioeconomic History    Marital status: Legally      Spouse name: Not on file    Number of children: Not on file    Years of education: Not on file    Highest education level: Not on file   Occupational History    Not on file   Tobacco Use    Smoking status: Never Smoker    Smokeless tobacco: Never Used   Vaping Use    Vaping Use: Never used   Substance and Sexual Activity    Alcohol use: Not Currently    Drug use: Yes     Types: Marijuana ([place]), Cocaine     Comment: + Cocaine 2/2021 also, see Care Everywhere UDS    Sexual activity: Not Currently     Partners: Male   Other Topics Concern    Not on file   Social History Narrative    Not on file     Social Determinants of Health     Financial Resource Strain: High Risk    Difficulty of Paying Living Expenses: Hard   Food Insecurity: Food Insecurity Present    Worried About Running Out of Food in the Last Year: Often true    Kelin of Food in the Last Year: Often true   Transportation Needs: Unmet Transportation Needs    Lack of Transportation (Medical):  Yes    Lack of Transportation (Non-Medical): Yes   Physical Activity: Inactive    Days of Exercise per Week: 0 days    Minutes of Exercise per Session: 0 min   Stress: Stress Concern Present    Feeling of Stress : Rather much   Social Connections:     Frequency of Communication with Friends and Family: Not on file    Frequency of Social Gatherings with Friends and Family: Not on file    Attends Spiritism Services: Not on file    Active Member of Clubs or Organizations: Not on file    Attends Club or Organization Meetings: Not on file    Marital Status: Not on file   Intimate Partner Violence:     Fear of Current or Ex-Partner: Not on file    Emotionally Abused: Not on file    Physically Abused: Not on file    Sexually Abused: Not on file   Housing Stability: High Risk    Unable to Pay for Housing in the Last Year: Yes    Number of Places Lived in the Last Year: 2    Unstable Housing in the Last Year: Yes       Family History:     Family History   Problem Relation Age of Onset    Diabetes Mother     Stroke Mother     Diabetes Father     Diabetes Sister     Diabetes Brother     Other Son         GSW    No Known Problems Sister       Medical Decision Making:   I have independently reviewed/ordered the following labs:    CBC with Differential:   Recent Labs     02/02/22  0335 02/03/22  0504   WBC 9.3 8.6   HGB 7.7* 7.7*   HCT 25.5* 25.3*   * 463*   LYMPHOPCT 31 31   MONOPCT 9 11     BMP:  Recent Labs     02/02/22  0542 02/03/22  0504   * 136   K 4.6 4.5    102   CO2 25 25   BUN 19 20   CREATININE 0.94* 0.83     Hepatic Function Panel:   No results for input(s): PROT, LABALBU, BILIDIR, IBILI, BILITOT, ALKPHOS, ALT, AST in the last 72 hours. No results for input(s): RPR in the last 72 hours. No results for input(s): HIV in the last 72 hours. No results for input(s): BC in the last 72 hours.   Lab Results   Component Value Date    CREATININE 0.83 02/03/2022    GLUCOSE 205 02/03/2022       Detailed results:    Naomi Cheema MD  Internal Medicine Resident, PGY-1  Infectious Disease Service,  [facility]. [facility]. Thank you for allowing us to participate in the care of this patient. Please call with questions. This note is created with the assistance of a speech recognition program.  While intending to generate adocument that actually reflects the content of the visit, the document can still have some errors including those of syntax and sound a like substitutions which may escape proof reading. It such instances, actual meaningcan be extrapolated by contextual diversion. I have discussed the care of the patient, including pertinent history and exam findings,  with the resident. I have seen and examined the patient and the key elements of all parts of the encounter have been performed by me. I agree with the assessment, plan and orders as documented by the resident.     Maylin Puga, Infectious Diseases

## 2022-02-05 LAB
ABSOLUTE EOS #: 0.23 K/UL (ref 0–0.4)
ABSOLUTE IMMATURE GRANULOCYTE: 0 K/UL (ref 0–0.3)
ABSOLUTE LYMPH #: 3.3 K/UL (ref 1–4.8)
ABSOLUTE MONO #: 0.38 K/UL (ref 0.1–0.8)
ANION GAP SERPL CALCULATED.3IONS-SCNC: 10 MMOL/L (ref 9–17)
BASOPHILS # BLD: 0 % (ref 0–2)
BASOPHILS ABSOLUTE: 0 K/UL (ref 0–0.2)
BUN BLDV-MCNC: 19 MG/DL (ref 6–20)
BUN/CREAT BLD: NORMAL (ref 9–20)
CALCIUM SERPL-MCNC: 8.8 MG/DL (ref 8.6–10.4)
CHLORIDE BLD-SCNC: 102 MMOL/L (ref 98–107)
CO2: 23 MMOL/L (ref 20–31)
CREAT SERPL-MCNC: 0.88 MG/DL (ref 0.5–0.9)
DIFFERENTIAL TYPE: ABNORMAL
EOSINOPHILS RELATIVE PERCENT: 3 % (ref 1–4)
GFR AFRICAN AMERICAN: >60 ML/MIN
GFR NON-AFRICAN AMERICAN: >60 ML/MIN
GFR SERPL CREATININE-BSD FRML MDRD: NORMAL ML/MIN/{1.73_M2}
GFR SERPL CREATININE-BSD FRML MDRD: NORMAL ML/MIN/{1.73_M2}
GLUCOSE BLD-MCNC: 104 MG/DL (ref 65–105)
GLUCOSE BLD-MCNC: 120 MG/DL (ref 65–105)
GLUCOSE BLD-MCNC: 144 MG/DL (ref 65–105)
GLUCOSE BLD-MCNC: 81 MG/DL (ref 70–99)
GLUCOSE BLD-MCNC: 88 MG/DL (ref 65–105)
HCT VFR BLD CALC: 27.7 % (ref 36.3–47.1)
HEMOGLOBIN: 8.4 G/DL (ref 11.9–15.1)
IMMATURE GRANULOCYTES: 0 %
LYMPHOCYTES # BLD: 44 % (ref 24–44)
MCH RBC QN AUTO: 31.3 PG (ref 25.2–33.5)
MCHC RBC AUTO-ENTMCNC: 30.3 G/DL (ref 28.4–34.8)
MCV RBC AUTO: 103.4 FL (ref 82.6–102.9)
MONOCYTES # BLD: 5 % (ref 1–7)
MORPHOLOGY: ABNORMAL
MORPHOLOGY: ABNORMAL
NRBC AUTOMATED: 0 PER 100 WBC
PDW BLD-RTO: 14.3 % (ref 11.8–14.4)
PLATELET # BLD: 459 K/UL (ref 138–453)
PLATELET ESTIMATE: ABNORMAL
PMV BLD AUTO: 10 FL (ref 8.1–13.5)
POTASSIUM SERPL-SCNC: 4.8 MMOL/L (ref 3.7–5.3)
RBC # BLD: 2.68 M/UL (ref 3.95–5.11)
RBC # BLD: ABNORMAL 10*6/UL
SEG NEUTROPHILS: 48 % (ref 36–66)
SEGMENTED NEUTROPHILS ABSOLUTE COUNT: 3.59 K/UL (ref 1.8–7.7)
SODIUM BLD-SCNC: 135 MMOL/L (ref 135–144)
WBC # BLD: 7.5 K/UL (ref 3.5–11.3)
WBC # BLD: ABNORMAL 10*3/UL

## 2022-02-05 PROCEDURE — 6370000000 HC RX 637 (ALT 250 FOR IP): Performed by: PODIATRIST

## 2022-02-05 PROCEDURE — 85025 COMPLETE CBC W/AUTO DIFF WBC: CPT

## 2022-02-05 PROCEDURE — 6370000000 HC RX 637 (ALT 250 FOR IP)

## 2022-02-05 PROCEDURE — 2580000003 HC RX 258: Performed by: INTERNAL MEDICINE

## 2022-02-05 PROCEDURE — 99233 SBSQ HOSP IP/OBS HIGH 50: CPT | Performed by: STUDENT IN AN ORGANIZED HEALTH CARE EDUCATION/TRAINING PROGRAM

## 2022-02-05 PROCEDURE — 36415 COLL VENOUS BLD VENIPUNCTURE: CPT

## 2022-02-05 PROCEDURE — 2580000003 HC RX 258: Performed by: PODIATRIST

## 2022-02-05 PROCEDURE — 1200000000 HC SEMI PRIVATE

## 2022-02-05 PROCEDURE — 82947 ASSAY GLUCOSE BLOOD QUANT: CPT

## 2022-02-05 PROCEDURE — 99232 SBSQ HOSP IP/OBS MODERATE 35: CPT | Performed by: INTERNAL MEDICINE

## 2022-02-05 PROCEDURE — 6360000002 HC RX W HCPCS

## 2022-02-05 PROCEDURE — 80048 BASIC METABOLIC PNL TOTAL CA: CPT

## 2022-02-05 PROCEDURE — 6360000002 HC RX W HCPCS: Performed by: INTERNAL MEDICINE

## 2022-02-05 RX ORDER — SENNA AND DOCUSATE SODIUM 50; 8.6 MG/1; MG/1
2 TABLET, FILM COATED ORAL DAILY PRN
Status: DISCONTINUED | OUTPATIENT
Start: 2022-02-05 | End: 2022-02-08 | Stop reason: HOSPADM

## 2022-02-05 RX ORDER — KETOROLAC TROMETHAMINE 15 MG/ML
15 INJECTION, SOLUTION INTRAMUSCULAR; INTRAVENOUS ONCE
Status: COMPLETED | OUTPATIENT
Start: 2022-02-05 | End: 2022-02-05

## 2022-02-05 RX ADMIN — DICYCLOMINE HYDROCHLORIDE 10 MG: 10 CAPSULE ORAL at 17:19

## 2022-02-05 RX ADMIN — DICYCLOMINE HYDROCHLORIDE 10 MG: 10 CAPSULE ORAL at 13:09

## 2022-02-05 RX ADMIN — BUDESONIDE AND FORMOTEROL FUMARATE DIHYDRATE 2 PUFF: 80; 4.5 AEROSOL RESPIRATORY (INHALATION) at 21:13

## 2022-02-05 RX ADMIN — AMPICILLIN SODIUM AND SULBACTAM SODIUM 3000 MG: 2; 1 INJECTION, POWDER, FOR SOLUTION INTRAMUSCULAR; INTRAVENOUS at 00:43

## 2022-02-05 RX ADMIN — OXYCODONE HYDROCHLORIDE 5 MG: 5 TABLET ORAL at 08:40

## 2022-02-05 RX ADMIN — FLUTICASONE PROPIONATE 1 SPRAY: 50 SPRAY, METERED NASAL at 08:40

## 2022-02-05 RX ADMIN — AMPICILLIN SODIUM AND SULBACTAM SODIUM 3000 MG: 2; 1 INJECTION, POWDER, FOR SOLUTION INTRAMUSCULAR; INTRAVENOUS at 06:36

## 2022-02-05 RX ADMIN — HYDROXYZINE HYDROCHLORIDE 10 MG: 10 TABLET ORAL at 21:12

## 2022-02-05 RX ADMIN — ACETAMINOPHEN 650 MG: 325 TABLET ORAL at 08:40

## 2022-02-05 RX ADMIN — KETOROLAC TROMETHAMINE 15 MG: 15 INJECTION, SOLUTION INTRAMUSCULAR; INTRAVENOUS at 21:13

## 2022-02-05 RX ADMIN — HYDROXYZINE HYDROCHLORIDE 10 MG: 10 TABLET ORAL at 13:10

## 2022-02-05 RX ADMIN — OXYCODONE HYDROCHLORIDE 5 MG: 5 TABLET ORAL at 18:23

## 2022-02-05 RX ADMIN — AMPICILLIN SODIUM AND SULBACTAM SODIUM 3000 MG: 2; 1 INJECTION, POWDER, FOR SOLUTION INTRAMUSCULAR; INTRAVENOUS at 18:23

## 2022-02-05 RX ADMIN — OXYCODONE HYDROCHLORIDE 5 MG: 5 TABLET ORAL at 03:03

## 2022-02-05 RX ADMIN — PREGABALIN 200 MG: 100 CAPSULE ORAL at 08:41

## 2022-02-05 RX ADMIN — DICYCLOMINE HYDROCHLORIDE 10 MG: 10 CAPSULE ORAL at 21:13

## 2022-02-05 RX ADMIN — POLYETHYLENE GLYCOL 3350 17 G: 17 POWDER, FOR SOLUTION ORAL at 08:40

## 2022-02-05 RX ADMIN — PANTOPRAZOLE SODIUM 20 MG: 40 TABLET, DELAYED RELEASE ORAL at 06:36

## 2022-02-05 RX ADMIN — PREGABALIN 200 MG: 100 CAPSULE ORAL at 21:12

## 2022-02-05 RX ADMIN — HYDROXYZINE HYDROCHLORIDE 10 MG: 10 TABLET ORAL at 03:03

## 2022-02-05 RX ADMIN — MIRTAZAPINE 7.5 MG: 15 TABLET, FILM COATED ORAL at 21:12

## 2022-02-05 RX ADMIN — OXYCODONE HYDROCHLORIDE 5 MG: 5 TABLET ORAL at 13:10

## 2022-02-05 RX ADMIN — PANTOPRAZOLE SODIUM 20 MG: 40 TABLET, DELAYED RELEASE ORAL at 17:19

## 2022-02-05 RX ADMIN — DICYCLOMINE HYDROCHLORIDE 10 MG: 10 CAPSULE ORAL at 08:41

## 2022-02-05 RX ADMIN — TRAZODONE HYDROCHLORIDE 150 MG: 100 TABLET ORAL at 21:12

## 2022-02-05 RX ADMIN — ACETAMINOPHEN 650 MG: 325 TABLET ORAL at 00:43

## 2022-02-05 RX ADMIN — INSULIN GLARGINE 28 UNITS: 100 INJECTION, SOLUTION SUBCUTANEOUS at 08:40

## 2022-02-05 RX ADMIN — SODIUM CHLORIDE, PRESERVATIVE FREE 10 ML: 5 INJECTION INTRAVENOUS at 21:13

## 2022-02-05 RX ADMIN — AMPICILLIN SODIUM AND SULBACTAM SODIUM 3000 MG: 2; 1 INJECTION, POWDER, FOR SOLUTION INTRAMUSCULAR; INTRAVENOUS at 12:47

## 2022-02-05 RX ADMIN — BUDESONIDE AND FORMOTEROL FUMARATE DIHYDRATE 2 PUFF: 80; 4.5 AEROSOL RESPIRATORY (INHALATION) at 08:40

## 2022-02-05 RX ADMIN — VENLAFAXINE HYDROCHLORIDE 150 MG: 150 CAPSULE, EXTENDED RELEASE ORAL at 08:41

## 2022-02-05 ASSESSMENT — ENCOUNTER SYMPTOMS
EYE DISCHARGE: 0
EYE ITCHING: 0
APNEA: 0
DIARRHEA: 0
EYE PAIN: 0
CONSTIPATION: 0
EYE REDNESS: 0
PHOTOPHOBIA: 0
COLOR CHANGE: 1
SORE THROAT: 0
RHINORRHEA: 0
ABDOMINAL PAIN: 0
VOMITING: 0
ABDOMINAL DISTENTION: 0
COUGH: 0
SHORTNESS OF BREATH: 0
NAUSEA: 0

## 2022-02-05 ASSESSMENT — PAIN SCALES - GENERAL
PAINLEVEL_OUTOF10: 10

## 2022-02-05 NOTE — PLAN OF CARE
Problem: Falls - Risk of:  Goal: Will remain free from falls  Description: Will remain free from falls  Outcome: Ongoing  Goal: Absence of physical injury  Description: Absence of physical injury  Outcome: Ongoing     Problem: Musculor/Skeletal Functional Status  Goal: Highest potential functional level  Outcome: Ongoing     Problem: Nutrition  Goal: Optimal nutrition therapy  Outcome: Ongoing     Problem: Skin Integrity:  Goal: Will show no infection signs and symptoms  Description: Will show no infection signs and symptoms  Outcome: Ongoing  Goal: Absence of new skin breakdown  Description: Absence of new skin breakdown  Outcome: Ongoing     Problem: Pain:  Goal: Pain level will decrease  Description: Pain level will decrease  Outcome: Not Met This Shift  Goal: Control of acute pain  Description: Control of acute pain  Outcome: Not Met This Shift  Goal: Control of chronic pain  Description: Control of chronic pain  Outcome: Not Met This Shift

## 2022-02-05 NOTE — PROGRESS NOTES
[address] [facility]  Progress Note    Date:   2/5/2022  Patient name:  Priya Luong  Date of admission:  1/22/2022  4:43 PM  MRN:   [id number]  YOB: 1965    SUBJECTIVE/Last 24 hours update:     Patient seen and examined at the bed side. Patient lying comfortably in bed. She continues to complain of constant, dull, 9 out of 10 right foot pain. Patient is currently on Roxicodone every 4 hours. Overnight patient requested Toradol as needed but was not given. Discussed with nurse about decreasing frequency Roxicodone to every 6 hours. Discharge orders placed, pre-CERT pending. Notes from nursing staff and Consults had been reviewed, and the overnight progress had been checked with the nursing staff as well. REVIEW OF SYSTEMS:      Review of Systems   Constitutional: Negative for appetite change, fatigue and fever. HENT: Negative for ear pain, rhinorrhea and sore throat. Eyes: Negative for discharge and visual disturbance. Respiratory: Negative for cough and shortness of breath. Cardiovascular: Negative for chest pain and palpitations. Gastrointestinal: Negative for abdominal pain, constipation, diarrhea, nausea and vomiting. Endocrine: Negative for polyuria. Genitourinary: Negative for dysuria. Musculoskeletal: Negative for arthralgias. Right foot pain   Skin: Negative for rash. Neurological: Negative for dizziness, weakness, light-headedness and headaches. Psychiatric/Behavioral: Negative for agitation.         PAST MEDICAL HISTORY:      has a past medical history of Acid reflux, Acute cystitis with hematuria, Acute non-recurrent maxillary sinusitis, Asthma, Bipolar 1 disorder ([address] Bipolar disorder, mixed ([address] BMI 34.0-34.9,adult, Cannabis use disorder, severe, dependence ([address] Cerebrovascular accident (CVA) ([address] Chest pain, Chronic renal insufficiency, Cocaine abuse ([address] COVID-19 virus RNA test result unknown, DDD (degenerative disc disease), cervical, Diabetes mellitus ([address] Dizziness, Fibromyalgia, History of stroke, Homicidal ideation, Hyperglycemia, Hypertension, Hypotension, IDDM (insulin dependent diabetes mellitus), Lupus ([address] Migraine, Neuropathy, Neuropathy, Polysubstance abuse ([address] Post traumatic stress disorder (PTSD), Posttraumatic stress disorder, Recurrent depression ([address] Recurrent major depressive disorder, in partial remission ([address] Screening mammogram, encounter for, Severe recurrent major depression with psychotic features ([address] Severe recurrent major depression without psychotic features ([address] Stroke (cerebrum) ([address] Stroke ([address] Suicidal ideation, Suicidal intent, Vitamin D deficiency, and White matter changes. PAST SURGICAL HISTORY:      has a past surgical history that includes Abscess Drainage; chest tube insertion; Abdomen surgery; Cataract removal with implant (Bilateral); LASIK (Bilateral); Finger amputation (Left, 03/13/2021); Hand surgery (Right, 09/14/2021); Hand surgery (Right, 09/15/2021); Finger amputation (Right, 10/11/2021); Foot surgery (Right, 01/27/2022); Foot Debridement (Right, 1/27/2022); and Insert Midline Catheter (2/3/2022). SOCIAL HISTORY:      reports that she has never smoked. She has never used smokeless tobacco. She reports previous alcohol use. She reports current drug use. Drugs: Marijuana (Weed) and Cocaine. FAMILY HISTORY:     family history includes Diabetes in her brother, father, mother, and sister; No Known Problems in her sister; Other in her son; Stroke in her mother. HOME MEDICATIONS:      Prior to Admission medications    Medication Sig Start Date End Date Taking? Authorizing Provider   oxyCODONE (ROXICODONE) 5 MG immediate release tablet Take 1 tablet by mouth every 4 hours as needed for Pain for up to 5 days.  2/3/22 2/8/22 Yes Anthony Zuñiga MD   [facility] Infuse 1,000 mg intravenously every 24 hours for 14 days Compound per protocol. 1/30/22 2/13/22 Yes Maylin Oconnor MD   pregabalin (LYRICA) 200 MG capsule Take 1 capsule by mouth 2 times daily for 30 days. 1/20/22 2/19/22  Aly Faust MD   insulin glargine (LANTUS SOLOSTAR) 100 UNIT/ML injection pen Inject 30 Units into the skin 2 times daily 1/20/22   Aly Faust MD   fluticasone Shelba Solian) 50 MCG/ACT nasal spray 1 spray by Each Nostril route daily 12/22/21   Margie Romo MD   acetaminophen (TYLENOL) 500 MG tablet Take 2 tablets by mouth 3 times daily as needed for Pain 12/22/21   Margie Romo MD   dicyclomine (BENTYL) 10 MG capsule Take 1 capsule by mouth 4 times daily 12/17/21   Margie Romo MD   metFORMIN (GLUCOPHAGE) 1000 MG tablet Take 1 tablet by mouth 2 times daily (with meals) 12/17/21   Margie Romo MD   mirtazapine (REMERON) 7.5 MG tablet Take 1 tablet by mouth nightly 12/17/21   Margie Romo MD   pantoprazole (PROTONIX) 20 MG tablet Take 1 tablet by mouth 2 times daily (before meals) 12/17/21   Margie Romo MD   traZODone (DESYREL) 150 MG tablet Take 1 tablet by mouth nightly 12/17/21   Margie Romo MD   venlafaxine (EFFEXOR XR) 150 MG extended release capsule Take 1 capsule by mouth daily (with breakfast) 12/17/21   Margie Romo MD   ibuprofen (ADVIL;MOTRIN) 800 MG tablet Take 1 tablet by mouth every 8 hours as needed for Pain 11/15/21 11/29/21  Nay Merino MD   Lancets MISC 1 each by Does not apply route 3 times daily 11/15/21   Nay Merino MD   Alcohol Swabs 70 % PADS Use 1 swab 3 times daily as needed 11/15/21   Nay Merino MD   blood glucose monitor strips Test 3 times a day & as needed for symptoms of irregular blood glucose. Dispense sufficient amount for indicated testing frequency plus additional to accommodate PRN testing needs.  11/8/21   Mathew Galeazzi, MD   Lancets MISC 1 each by Does not apply route 3 times daily 11/8/21   Mathew Galeazzi, MD   Insulin Pen Needle [facility] PEN NEEDLES 31G) 31G X 8 MM MISC 1 each by Does not apply route daily 11/8/21   Junior Zhang MD   FREESTYLE LITE strip 1 each by Does not apply route 3 times daily 11/11/20   Rosita Vieira MD   albuterol sulfate  (90 Base) MCG/ACT inhaler Inhale 2 puffs into the lungs every 4 hours as needed for Wheezing 10/20/20 9/22/21  Rosita Vieira MD   FLOVENT  MCG/ACT inhaler Inhale 2 puffs into the lungs 2 times daily 10/20/20   Rosita Vieira MD   budesonide-formoterol (SYMBICORT) 80-4.5 MCG/ACT AERO Inhale 2 puffs into the lungs 2 times daily 10/20/20   Rosita Vieira MD       ALLERGIES:     Bactrim [sulfamethoxazole-trimethoprim] and Adhesive tape      OBJECTIVE:       Vitals:    02/04/22 0912 02/04/22 0915 02/04/22 1940 02/05/22 0600   BP: 133/85  (!) 111/101    Pulse:   86    Resp:  16 20    Temp:   97.7 °F (36.5 °C)    TempSrc:   Oral    SpO2:  95% 97%    Weight:    251 lb 9.6 oz (114.1 kg)   Height:           No intake or output data in the 24 hours ending 02/05/22 0723    PHYSICAL EXAM:  Physical Exam  Constitutional:       General: She is not in acute distress. Appearance: Normal appearance. She is obese. Cardiovascular:      Rate and Rhythm: Normal rate and regular rhythm. Heart sounds: Normal heart sounds. No murmur heard. No friction rub. Pulmonary:      Breath sounds: Normal breath sounds. No wheezing or rales. Abdominal:      General: Bowel sounds are normal.      Palpations: Abdomen is soft. Tenderness: There is no abdominal tenderness. There is no guarding or rebound. Musculoskeletal:         General: Tenderness present. No swelling. Normal range of motion. Comments: Right foot wrapped and Ace bandage. Skin:     Findings: No rash. Neurological:      Mental Status: She is alert and oriented to person, place, and time.    Psychiatric:         Mood and Affect: Mood normal.          DIAGNOSTICS:     Laboratory Testing:    Recent Results (from the past 24 hour(s))   POC Glucose Fingerstick    Collection Time: 02/04/22  7:45 AM   Result Value Ref Range    POC Glucose 199 (H) 65 - 105 mg/dL   POC Glucose Fingerstick    Collection Time: 02/04/22 10:51 AM   Result Value Ref Range    POC Glucose 298 (H) 65 - 105 mg/dL   POC Glucose Fingerstick    Collection Time: 02/04/22  3:37 PM   Result Value Ref Range    POC Glucose 151 (H) 65 - 105 mg/dL   POC Glucose Fingerstick    Collection Time: 02/04/22  8:29 PM   Result Value Ref Range    POC Glucose 238 (H) 65 - 105 mg/dL   CBC auto differential    Collection Time: 02/05/22  6:45 AM   Result Value Ref Range    WBC 7.5 3.5 - 11.3 k/uL    RBC 2.68 (L) 3.95 - 5.11 m/uL    Hemoglobin 8.4 (L) 11.9 - 15.1 g/dL    Hematocrit 27.7 (L) 36.3 - 47.1 %    .4 (H) 82.6 - 102.9 fL    MCH 31.3 25.2 - 33.5 pg    MCHC 30.3 28.4 - 34.8 g/dL    RDW 14.3 11.8 - 14.4 %    Platelets 477 (H) 073 - 453 k/uL    MPV 10.0 8.1 - 13.5 fL    NRBC Automated 0.0 0.0 per 100 WBC    Differential Type NOT REPORTED     Seg Neutrophils PENDING %    Lymphocytes PENDING %    Monocytes PENDING %    Eosinophils % PENDING %    Basophils PENDING %    Immature Granulocytes PENDING 0 %    Segs Absolute PENDING k/uL    Absolute Lymph # PENDING k/uL    Absolute Mono # PENDING k/uL    Absolute Eos # PENDING k/uL    Basophils Absolute PENDING 0.0 - 0.2 k/uL    Absolute Immature Granulocyte PENDING 0.00 - 0.30 k/uL    WBC Morphology NOT REPORTED     RBC Morphology NOT REPORTED     Platelet Estimate NOT REPORTED    POC Glucose Fingerstick    Collection Time: 02/05/22  6:56 AM   Result Value Ref Range    POC Glucose 88 65 - 105 mg/dL         Imaging/Diagonstics:    Current Facility-Administered Medications   Medication Dose Route Frequency Provider Last Rate Last Admin    insulin glargine (LANTUS) injection vial 28 Units  28 Units SubCUTAneous BID Karey Lindo MD   28 Units at 02/04/22 2040    hydrOXYzine (ATARAX) tablet 10 mg  10 mg Oral TID PRN Anthony Pretty MD 10 mg at 02/05/22 0303    ampicillin-sulbactam (UNASYN) 3000 mg ivpb minibag  3,000 mg IntraVENous Q6H Maylin Nash  mL/hr at 02/05/22 0636 3,000 mg at 02/05/22 0636    simethicone (MYLICON) chewable tablet 80 mg  80 mg Oral Q6H PRN Gautam Rojo MD   80 mg at 02/01/22 1704    enoxaparin (LOVENOX) injection 30 mg  30 mg SubCUTAneous BID Wicho Gabriel DPM   30 mg at 02/01/22 2028    albuterol sulfate  (90 Base) MCG/ACT inhaler 2 puff  2 puff Inhalation Q4H PRN Wicho Gabriel DPVALERIA   2 puff at 02/01/22 1712    oxyCODONE (ROXICODONE) immediate release tablet 5 mg  5 mg Oral Q4H PRN Wicho Gabriel DPM   5 mg at 02/05/22 0303    budesonide-formoterol (SYMBICORT) 80-4.5 MCG/ACT inhaler 2 puff  2 puff Inhalation BID Wicho Gabriel DPM   2 puff at 02/04/22 0915    dicyclomine (BENTYL) capsule 10 mg  10 mg Oral 4x Daily Wicho Gabriel DPM   10 mg at 02/04/22 2037    fluticasone (FLONASE) 50 MCG/ACT nasal spray 1 spray  1 spray Each Nostril Daily Wicho Gabriel DPVALERIA   1 spray at 02/04/22 0915    mirtazapine (REMERON) tablet 7.5 mg  7.5 mg Oral Nightly Wicho Gabriel DPM   7.5 mg at 02/04/22 2038    pantoprazole (PROTONIX) tablet 20 mg  20 mg Oral BID AC Wicho Gabriel DPM   20 mg at 02/05/22 0636    pregabalin (LYRICA) capsule 200 mg  200 mg Oral BID Wicho Gabriel DPM   200 mg at 02/04/22 2037    traZODone (DESYREL) tablet 150 mg  150 mg Oral Nightly Wicho Gabriel DPM   150 mg at 02/04/22 2037    venlafaxine (EFFEXOR XR) extended release capsule 150 mg  150 mg Oral Daily with breakfast Wicho Gabriel DPM   150 mg at 02/04/22 0914    sodium chloride flush 0.9 % injection 5-40 mL  5-40 mL IntraVENous 2 times per day Wicho Gabriel, DPM   10 mL at 02/04/22 2038    sodium chloride flush 0.9 % injection 5-40 mL  5-40 mL IntraVENous PRN Wicho Gabriel DPM        0.9 % sodium chloride infusion  25 mL IntraVENous PRN Wicho Gabriel DPM        ondansetron (ZOFRAN-ODT) disintegrating tablet 4 mg  4 mg Oral Q8H PRN Wicho Gabriel, DPM   4 mg at 01/29/22 1201 Or    ondansetron (ZOFRAN) injection 4 mg  4 mg IntraVENous Q6H PRN Vassie Stapler, DPM        polyethylene glycol (GLYCOLAX) packet 17 g  17 g Oral Daily PRN Vassie Stapler, DPM   17 g at 02/04/22 1117    acetaminophen (TYLENOL) tablet 650 mg  650 mg Oral Q6H PRN Vassie Stapler, DPM   650 mg at 02/05/22 0043    Or    acetaminophen (TYLENOL) suppository 650 mg  650 mg Rectal Q6H PRN Vassie Stapler, DPM        potassium chloride (KLOR-CON M) extended release tablet 40 mEq  40 mEq Oral PRN Vassie Stapler, DPM        Or    potassium bicarb-citric acid (EFFER-K) effervescent tablet 40 mEq  40 mEq Oral PRN Vassie Stapler, DPM        Or    potassium chloride 10 mEq/100 mL IVPB (Peripheral Line)  10 mEq IntraVENous PRN Vassie Stapler, DPM        insulin lispro (HUMALOG) injection vial 0-12 Units  0-12 Units SubCUTAneous TID WC Vassie Stapler, DPM   2 Units at 02/04/22 1746    insulin lispro (HUMALOG) injection vial 0-6 Units  0-6 Units SubCUTAneous Nightly Vassie Stapler, DPM   2 Units at 02/04/22 2040    glucose (GLUTOSE) 40 % oral gel 15 g  15 g Oral PRN Vassie Stapler, DPM   15 g at 01/26/22 0350    dextrose 50 % IV solution  12.5 g IntraVENous PRN Vassie Stapler, DPM        glucagon (rDNA) injection 1 mg  1 mg IntraMUSCular PRN Vassie Stapler, DPM        dextrose 5 % solution  100 mL/hr IntraVENous PRN Vassie Stapler, DPM           ASSESSMENT:     Active Problems:    Type 2 diabetes mellitus without complication, with long-term current use of insulin (HCC)    Right foot infection    Cellulitis and abscess of toe of right foot    Abscess of right foot    Bilateral cellulitis of lower leg related to related to  uncontrolled diabetes    Right foot ulcer, with fat layer exposed ([address]    Ulcer of left foot, with fat layer exposed ([address]    CRP elevated    Status post incision and drainage  Resolved Problems:    * No resolved hospital problems.  *      PLAN:     Right foot wound s/p I&D x 2  - Afebrile   - S/p R foot I&D   - Wound culture positive for MSSA, group A strep, Andresdavidjaiden Collar on board              - No further surgical intervention at this time               - WBAT              - Discharge   - ID on board              - On Unasyn              - DC home on Invanz 1g daily x 2 weeks, with possible Augmentin afterwards              - FU outpatient office in 2 weeks  - Pain management, Roxicodone 5mg q4  - MRI concerning for possible abscess vs phlegmon, no osteomyelitis        Vaginal yeast infection, resolved. - New onset white clumpy vaginal discharge  - On antibiotic therapy   - Completed 3 days of Diflucan PO.     DM2  - A1C 10   - Glucose 81  - POCT glucose 88  - Lantus 28U BID   - Required 14 units short acting insulin   - Medium dose sliding scale   - Hypoglycemia protocol      Bipolar disorder  - Continue Remeron 7.5mg PO nightly   - Effexor  mg PO daily   - Trazodone 150 mg PO nightly     DVT prophylaxis: Lovenox 40 mg SC  GI prophylaxis: Protonix 20 mg BID   Dispo: Patient pre-CERT pending, discharge orders in, likely discharge 2/7/2022. Discussed care plan with nurse after getting her input. Above plan discussed with the patient, who agreed to the above plan     Plan will be discussed with the attending, Dr. Edilia Rey.      Anjelica Pinzon MD  Family Medicine Resident  2/5/2022 7:23 AM

## 2022-02-05 NOTE — PROGRESS NOTES
[facility] -   Infectious diseases evaluation    Progress Note    admission date 1/22/2022    reason for consultation:   Diabetic foot infection    Impression :   Current:  · Right diabetic foot infection, GAS  · Cellulitis both feet  · bandemia  · Bacteriuria vs UTI kleb S ancef  · CRP elevation    Other:  ·   Discussion / summary of stay / plan of care   ·   Recommendations   · foot cx MSSA R clinda  and GAS- finegoldia and aerococcus  · On  Ancef-2/1 switch to Unasyn   · MRI suggestive of a midfoot collection - podiatry feel it is a phlegmon  - tenderness better 2/3  · FU office in 2 weeks - DC on invanz 1 g daily x 2 weeks w office FU - might need po Augmentin afterwards      · Recent UC 1/24 : Klebsiella- no dysuria - but lower abd pain - on keflex till 1/29 completed    Infection Control Recommendations   · [place] Precautions  · Contact Isolation       Antimicrobial Stewardship Recommendations   · Simplification of therapy  · Targeted therapy  Coordination ofOutpatient Care:   · Estimated Length of IV antimicrobials:  · Patient will need Midline / picc Catheter Insertion:   · Patient will need SNF:  · Patient will need outpatient wound care:     History of Present Illness:   Initial history:  Vicky Guevara is a 49y.o.-year-old female with diabetes very well-known to my service, stepped on a piece of glass a few days prior to admission presented with swelling and drainage from right foot, culture came back with group A strep,  Patient has neuropathy from diabetes  WBC elevated 14,  sed [id number],     Podiatry debrided the superficial ulcer and felt it was not too deep yet there was cellulitis of both feet.   Hemoglobin A1c was 10    MRI of the left foot no osteomyelitis  MRI of the right foot no osteomyelitis    Leukocytosis responded to antibiotics, infectious consulted for antibiotic management  Patient is on cefepime and clindamycin    R foot very swollen and tender to light touch - suspect deeper involvement and needs more I/D    1/26  Pt is afebrile and stable this morning. WBC trending up (14 today). UC 1/24 growing Klebsiella. CT right leg 1/25 shows an abscess  Planned for I/D on 1/27  Pain still ++    1/27  Pt seen and examined at bedside, afebrile and hypotensive this morning. Endorsed a lot of pain overnight. WBC improved to 11   To go to OR today for I+D of R foot abscess. Started on Keflex until 1/29 for UTI  Will monitor on antibiotics and await podiatry recs    1/28  Pt afebrile and stable s/p I+D right foot yesterday. Pt c/o pain this morning but is managing. Pt also endorsing abdominal discomfort. XR Abd showing increased stool burden. WBC did increase to 15 from 11 yesterday. Wound Cx 1/27: pending but GPC in pairs seen. Will monitor on Keflex and Clinda    1/29  No fever  Lab: MSSA in wound , but clinda is R  Switching to ceftriaxone  Foot better - less edema and drainage -tender still    1/30  Right foot  but discharge is minimal from it. Otherwise she has no abdominal pain or diarrhea CRP is improved 54    1/31  Alert appropriate, right foot continues to be swollen very tender to touch and it is painful. The wound is not draining much though. No redness noticed. Podiatry watching at this point. Continue antibiotic  WBC is 10 and CRP continues to be 53 has not improved in today    2/1  Right foot remains tender to light touch over the sole and over the first metatarsal  Edema slightly better over the ankle and the dorsum but continues to be present over the shoulder without any drainage  Discussed with Dr. Shay Bland. , he will wait another few days and allow the antibiotics to work before deciding further procedure  Cultures are now growing Aerococcus and Mechelle via, on top of the MSSA and the group A strep    2/2  Foot remains very tender around the hallux and the small area, same areas.   Not improved  MRI suggestive of a collection that could be an abscess    2/3  Foot tenderness improved   Per podiatry this is a phlegmon on MRI and not an ABSCESS  Pt agreed to rehab - x few weeks  Will DC on invanz 2-3 weeks and FU office and anticipate po AB at some point -  Also FU w podiatry office to FU on progress of the foot    2/4  Pt is feeling much better, denies any other active complaints apart from the pain and discomfort on the right foot. No purulent discharge visible. Tenderness improved  Ongoing discharge planning to SNF. Precert restarted. Awaiting placement. 2/5   Pain is under control with the current regimen. Its mildly tender. Minimal serous discharge present  No significant change in the clinical status of the patient. Pt working with PT. Stable for discharge, ongoing precert. Interval changes  2/5/2022     BP (!) 111/101   Pulse 86   Temp 96 °F (35.6 °C) (Axillary)   Resp 20   Ht 5' 6\" (1.676 m)   Wt 251 lb 9.6 oz (114.1 kg)   LMP  (LMP Unknown)   SpO2 97%   BMI 40.61 kg/m²    Summary of relevant labs:  Labs:  W14 > 11>15-10->7.5  [id number]  -54-53  Micro:  cx foot pus GAS, (anaerococcus prevotii, finegoldia jessica light growth, which were not isolated at 5 days.)   Uc 1/24: Klebsiella  Wound Cx 1/27: GPC pairs/GAS     Imaging:  MRI of the right foot 2/2  Soft tissue defect along the plantar and medial surface of the midfoot with   suspected irregular fluid collection measuring 4.4 x 1.2 x 1.8 cm, which may   represent an abscess or phlegmon. XR abd 1/27: Increased stool burden seen diffusely throughout the colon suggesting   clinical presentation of constipation.  Phleboliths seen on both sides of the   pelvis. CT R foot 1/25:  1.  Shallow soft tissue ulceration plantar and medial to the 1st metatarsal   base with an underlying area of possible abscess versus phlegmon measuring   2.8 x 1.1 x 0.9 cm.   2. Extensive subcutaneous fat stranding compatible with cellulitis.  No soft   tissue gas.   3. No evidence of osteomyelitis or other acute osseous abnormality. U/S renal 1/25: Unremarkable    MRI R foot 1/23:  Subcutaneous edema and swelling, most notably affecting the dorsum of the   foot, which may reflect underlying cellulitis.  No discrete organized fluid   collection is seen within the limitations of a noncontrast study.       No imaging features of acute osteomyelitis appreciated. MRI L foot: 1/23:   Limited evaluation due to patient motion artifact on several sequences.       Skin thickening and subcutaneous edema seen within the plantar soft tissues   of the foot at the level of the proximal metatarsals, which may reflect   underlying cellulitis.  Within the limitations of a noncontrast study, no   discrete organized fluid collection is seen.       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 I have personally reviewed the past medical history, past surgical history, medications, social history, and family history, and I haveupdated the database accordingly. Allergies:   Bactrim [sulfamethoxazole-trimethoprim] and Adhesive tape     Review of Systems:     Review of Systems   Constitutional: Negative for activity change, appetite change, chills, diaphoresis and fatigue. HENT: Negative for congestion. Eyes: Negative for photophobia, pain, discharge, redness and itching. Respiratory: Negative for apnea, cough and shortness of breath. Cardiovascular: Negative for chest pain. Gastrointestinal: Negative for abdominal distention. Endocrine: Negative for cold intolerance, polydipsia and polyphagia. Genitourinary: Negative for dysuria and flank pain. Musculoskeletal: Negative for arthralgias. Skin: Positive for color change and wound. Allergic/Immunologic: Negative for environmental allergies, food allergies and immunocompromised state.    Neurological: Negative for dizziness, tremors, seizures, syncope, light-headedness, numbness and headaches. Hematological: Negative for adenopathy. Psychiatric/Behavioral: Negative for agitation. Physical Examination :       Physical Exam  Constitutional:       General: She is not in acute distress. Appearance: Normal appearance. She is not ill-appearing, toxic-appearing or diaphoretic. HENT:      Head: Normocephalic and atraumatic. Nose: Nose normal. No congestion or rhinorrhea. Eyes:      General: No scleral icterus. Pupils: Pupils are equal, round, and reactive to light. Cardiovascular:      Rate and Rhythm: Normal rate and regular rhythm. Heart sounds: Normal heart sounds. No murmur heard. Pulmonary:      Effort: No respiratory distress. Breath sounds: Normal breath sounds. No stridor. No wheezing. Abdominal:      General: There is no distension. Palpations: Abdomen is soft. There is no mass. Tenderness: There is no abdominal tenderness. Hernia: No hernia is present. Genitourinary:     Comments: Urine fatuma  Musculoskeletal:         General: Swelling present. No tenderness, deformity or signs of injury. Cervical back: Neck supple. No rigidity or tenderness. Skin:     General: Skin is dry. Coloration: Skin is not jaundiced or pale. Findings: Erythema present. No bruising, lesion or rash. Neurological:      Mental Status: She is alert and oriented to person, place, and time. Mental status is at baseline. Cranial Nerves: No cranial nerve deficit. Motor: No weakness. Psychiatric:         Mood and Affect: Mood normal.         Thought Content:  Thought content normal.         Past Medical History:     Past Medical History:   Diagnosis Date    Acid reflux 5/29/2017    Acute cystitis with hematuria 10/11/2016    Acute non-recurrent maxillary sinusitis 11/28/2017    Asthma     Bipolar 1 disorder ([facility] 1/4/2018    Bipolar disorder, mixed ([facility]     BMI 34.0-34.9,adult 11/28/2017    Cannabis use disorder, severe, dependence ([address] 12/19/2017    Cerebrovascular accident (CVA) ([address] 6/14/2017    Chest pain 11/5/2016    Chronic renal insufficiency     Cocaine abuse ([address] 1/5/2018    COVID-19 virus RNA test result unknown     DDD (degenerative disc disease), cervical     Diabetes mellitus ([address]     Dizziness 6/13/2017    Fibromyalgia     History of stroke 9/9/2017    Homicidal ideation 11/6/2017    Hyperglycemia     Hypertension     Hypotension 1/18/2019    IDDM (insulin dependent diabetes mellitus) 12/21/2015    Lupus ([address]     Migraine     Neuropathy     Neuropathy     Polysubstance abuse ([address] 9/17/2017    Post traumatic stress disorder (PTSD)     Posttraumatic stress disorder 5/29/2017    Recurrent depression ([address] 5/29/2017    Recurrent major depressive disorder, in partial remission ([address] 11/28/2017    Screening mammogram, encounter for 11/28/2017    Severe recurrent major depression with psychotic features ([address] 12/19/2017    Severe recurrent major depression without psychotic features ([address] 12/19/2017    Stroke (cerebrum) ([address] 6/14/2017    Stroke ([address]     per chart notes    Suicidal ideation     Suicidal intent 3/10/2017    Vitamin D deficiency 11/28/2017    White matter changes 6/13/2017       Past Surgical  History:     Past Surgical History:   Procedure Laterality Date    ABDOMEN SURGERY      drain tube    ABSCESS DRAINAGE      right buttock    CATARACT REMOVAL WITH IMPLANT Bilateral     CHEST TUBE INSERTION      FINGER AMPUTATION Left 03/13/2021    LEFT RING FINER AMPUTATION performed by Ly Coelho MD at [facility] Right 10/11/2021    AMPUTATION RIGHT INDEX FINGER performed by Ly Coelho MD at [address] [place] [address] Right 1/27/2022    FOOT ABSCESS INCISION AND DRAINAGE  (PULSE LAVAGE, CULTURE SWABS) performed by Ellen Melendez DPM at [facility] 01/27/2022    FOOT ABSCESS INCISION AND DRAINAGE (PULSE LAVAGE, CULTURE SWABS) - Right    HAND SURGERY Right 09/14/2021    I&D INDEX FINGER performed by Jaden Loomis MD at [facility] [address] [facility] [address] Right 09/15/2021    I&D INDEX FINGER performed by Jaden Loomis MD at [place] [address]  2/3/2022         LASIK Bilateral        Medications:      insulin glargine  28 Units SubCUTAneous BID    ampicillin-sulbactam  3,000 mg IntraVENous Q6H    enoxaparin  30 mg SubCUTAneous BID    budesonide-formoterol  2 puff Inhalation BID    dicyclomine  10 mg Oral 4x Daily    fluticasone  1 spray Each Nostril Daily    mirtazapine  7.5 mg Oral Nightly    pantoprazole  20 mg Oral BID AC    pregabalin  200 mg Oral BID    traZODone  150 mg Oral Nightly    venlafaxine  150 mg Oral Daily with breakfast    sodium chloride flush  5-40 mL IntraVENous 2 times per day    insulin lispro  0-12 Units SubCUTAneous TID WC    insulin lispro  0-6 Units SubCUTAneous Nightly       Social History:     Social History     Socioeconomic History    Marital status: Legally      Spouse name: Not on file    Number of children: Not on file    Years of education: Not on file    Highest education level: Not on file   Occupational History    Not on file   Tobacco Use    Smoking status: Never Smoker    Smokeless tobacco: Never Used   Vaping Use    Vaping Use: Never used   Substance and Sexual Activity    Alcohol use: Not Currently    Drug use: Yes     Types: Marijuana (Ty Brooms), Cocaine     Comment: + Cocaine 2/2021 also, see Care Everywhere UDS    Sexual activity: Not Currently     Partners: Male   Other Topics Concern    Not on file   Social History Narrative    Not on file     Social Determinants of Health     Financial Resource Strain: High Risk    Difficulty of Paying Living Expenses: Hard   Food Insecurity: Food Insecurity Present    Worried About Running Out of Food in the Last Year: Often true  Ran Out of Food in the Last Year: Often true   Transportation Needs: Unmet Transportation Needs    Lack of Transportation (Medical): Yes    Lack of Transportation (Non-Medical): Yes   Physical Activity: Inactive    Days of Exercise per Week: 0 days    Minutes of Exercise per Session: 0 min   Stress: Stress Concern Present    Feeling of Stress : Rather much   Social Connections:     Frequency of Communication with Friends and Family: Not on file    Frequency of Social Gatherings with Friends and Family: Not on file    Attends Taoism Services: Not on file    Active Member of Clubs or Organizations: Not on file    Attends Club or Organization Meetings: Not on file    Marital Status: Not on file   Intimate Partner Violence:     Fear of Current or Ex-Partner: Not on file    Emotionally Abused: Not on file    Physically Abused: Not on file    Sexually Abused: Not on file   Housing Stability: High Risk    Unable to Pay for Housing in the Last Year: Yes    Number of [place] in the Last Year: 2    Unstable Housing in the Last Year: Yes       Family History:     Family History   Problem Relation Age of Onset    Diabetes Mother     Stroke Mother     Diabetes Father     Diabetes Sister     Diabetes Brother     Other Son         GSW    No Known Problems Sister       Medical Decision Making:   I have independently reviewed/ordered the following labs:    CBC with Differential:   Recent Labs     02/03/22  0504 02/05/22  0645   WBC 8.6 7.5   HGB 7.7* 8.4*   HCT 25.3* 27.7*   * 459*   LYMPHOPCT 31 44   MONOPCT 11 5     BMP:  Recent Labs     02/03/22  0504 02/05/22  0645    135   K 4.5 4.8    102   CO2 25 23   BUN 20 19   CREATININE 0.83 0.88     Hepatic Function Panel:   No results for input(s): PROT, LABALBU, BILIDIR, IBILI, BILITOT, ALKPHOS, ALT, AST in the last 72 hours. No results for input(s): RPR in the last 72 hours.   No results for input(s): HIV in the last 72 hours. No results for input(s): BC in the last 72 hours. Lab Results   Component Value Date    CREATININE 0.88 02/05/2022    GLUCOSE 81 02/05/2022       Detailed results:    Saumya Ornelas MD  Internal Medicine Resident, PGY-1  Infectious Disease Service,  [facility]. ATTESTATION:    I have discussed the case, including pertinent history and exam findings with the residents and students. I have seen and examined the patient and the key elements of the encounter have been performed by me. I was present when the student obtained his information or examined the patient. I have reviewed the laboratory data, other diagnostic studies and discussed them with the residents. I have updated the medical record where necessary. I agree with the assessment, plan and orders as documented by the resident/ student. Brigette Pulliam MD.      Thank you for allowing us to participate in the care of this patient. Please call with questions. This note is created with the assistance of a speech recognition program.  While intending to generate adocument that actually reflects the content of the visit, the document can still have some errors including those of syntax and sound a like substitutions which may escape proof reading. It such instances, actual meaningcan be extrapolated by contextual diversion.

## 2022-02-05 NOTE — PROGRESS NOTES
Progress Note    This is a 61-year-old female who presented with swelling and drainage from the right foot after stepping on a piece of glass a few days ago. She is currently status post incision and drainage of her right foot which podiatry are following for. Patient seen at bedside and reported no pain or concerns. She did stress some frustration at having to stay at the hospital for a few additional days due to precertification issues. Patient did express understanding of the importance of completing the antibiotic treatment. She did express interest in receiving another shot of Toradol for management of her pain during the night. Patient appeared in no acute distress during the time of physical exam.  She did not have any pitting edema bilaterally. We will continue treatment with Roxicodone for pain and will order melatonin to assist with sleep. We will hold the Toradol for now.     Electronically signed by Guillermina Junior MD on 2/4/2022 at 9:27 PM

## 2022-02-05 NOTE — PROGRESS NOTES
Physical Therapy        Physical Therapy Cancel Note      DATE: 2022    NAME: Silver Springer  MRN: [id number]   : 1965      Patient not seen this date for Physical Therapy due to:     Other: Pt awaiting pain meds      Electronically signed by Magdalena Xavier PT on 2022 at 3:22 PM

## 2022-02-06 LAB
ABSOLUTE EOS #: 0.13 K/UL (ref 0–0.4)
ABSOLUTE IMMATURE GRANULOCYTE: 0 K/UL (ref 0–0.3)
ABSOLUTE LYMPH #: 2.58 K/UL (ref 1–4.8)
ABSOLUTE MONO #: 0.57 K/UL (ref 0.1–0.8)
ANION GAP SERPL CALCULATED.3IONS-SCNC: 10 MMOL/L (ref 9–17)
BASOPHILS # BLD: 0 % (ref 0–2)
BASOPHILS ABSOLUTE: 0 K/UL (ref 0–0.2)
BUN BLDV-MCNC: 25 MG/DL (ref 6–20)
BUN/CREAT BLD: ABNORMAL (ref 9–20)
CALCIUM SERPL-MCNC: 8.2 MG/DL (ref 8.6–10.4)
CHLORIDE BLD-SCNC: 105 MMOL/L (ref 98–107)
CO2: 23 MMOL/L (ref 20–31)
CREAT SERPL-MCNC: 1.22 MG/DL (ref 0.5–0.9)
DIFFERENTIAL TYPE: ABNORMAL
EOSINOPHILS RELATIVE PERCENT: 2 % (ref 1–4)
GFR AFRICAN AMERICAN: 56 ML/MIN
GFR NON-AFRICAN AMERICAN: 46 ML/MIN
GFR SERPL CREATININE-BSD FRML MDRD: ABNORMAL ML/MIN/{1.73_M2}
GFR SERPL CREATININE-BSD FRML MDRD: ABNORMAL ML/MIN/{1.73_M2}
GLUCOSE BLD-MCNC: 103 MG/DL (ref 65–105)
GLUCOSE BLD-MCNC: 120 MG/DL (ref 65–105)
GLUCOSE BLD-MCNC: 142 MG/DL (ref 70–99)
GLUCOSE BLD-MCNC: 145 MG/DL (ref 65–105)
GLUCOSE BLD-MCNC: 181 MG/DL (ref 65–105)
GLUCOSE BLD-MCNC: 63 MG/DL (ref 65–105)
GLUCOSE BLD-MCNC: 77 MG/DL (ref 65–105)
HCT VFR BLD CALC: 22.6 % (ref 36.3–47.1)
HEMOGLOBIN: 7.1 G/DL (ref 11.9–15.1)
IMMATURE GRANULOCYTES: 0 %
LYMPHOCYTES # BLD: 41 % (ref 24–44)
MCH RBC QN AUTO: 31.7 PG (ref 25.2–33.5)
MCHC RBC AUTO-ENTMCNC: 31.4 G/DL (ref 28.4–34.8)
MCV RBC AUTO: 100.9 FL (ref 82.6–102.9)
MONOCYTES # BLD: 9 % (ref 1–7)
MORPHOLOGY: ABNORMAL
MORPHOLOGY: ABNORMAL
NRBC AUTOMATED: 0.3 PER 100 WBC
PDW BLD-RTO: 14.6 % (ref 11.8–14.4)
PLATELET # BLD: 476 K/UL (ref 138–453)
PLATELET ESTIMATE: ABNORMAL
PMV BLD AUTO: 10.2 FL (ref 8.1–13.5)
POTASSIUM SERPL-SCNC: 4.6 MMOL/L (ref 3.7–5.3)
RBC # BLD: 2.24 M/UL (ref 3.95–5.11)
RBC # BLD: ABNORMAL 10*6/UL
SEG NEUTROPHILS: 48 % (ref 36–66)
SEGMENTED NEUTROPHILS ABSOLUTE COUNT: 3.02 K/UL (ref 1.8–7.7)
SODIUM BLD-SCNC: 138 MMOL/L (ref 135–144)
WBC # BLD: 6.3 K/UL (ref 3.5–11.3)
WBC # BLD: ABNORMAL 10*3/UL

## 2022-02-06 PROCEDURE — 6370000000 HC RX 637 (ALT 250 FOR IP)

## 2022-02-06 PROCEDURE — 97116 GAIT TRAINING THERAPY: CPT

## 2022-02-06 PROCEDURE — 80048 BASIC METABOLIC PNL TOTAL CA: CPT

## 2022-02-06 PROCEDURE — 1200000000 HC SEMI PRIVATE

## 2022-02-06 PROCEDURE — 6360000002 HC RX W HCPCS: Performed by: INTERNAL MEDICINE

## 2022-02-06 PROCEDURE — 6370000000 HC RX 637 (ALT 250 FOR IP): Performed by: PODIATRIST

## 2022-02-06 PROCEDURE — 99233 SBSQ HOSP IP/OBS HIGH 50: CPT | Performed by: STUDENT IN AN ORGANIZED HEALTH CARE EDUCATION/TRAINING PROGRAM

## 2022-02-06 PROCEDURE — 2580000003 HC RX 258: Performed by: INTERNAL MEDICINE

## 2022-02-06 PROCEDURE — 82947 ASSAY GLUCOSE BLOOD QUANT: CPT

## 2022-02-06 PROCEDURE — 99232 SBSQ HOSP IP/OBS MODERATE 35: CPT | Performed by: INTERNAL MEDICINE

## 2022-02-06 PROCEDURE — 36415 COLL VENOUS BLD VENIPUNCTURE: CPT

## 2022-02-06 PROCEDURE — 2580000003 HC RX 258

## 2022-02-06 PROCEDURE — 85025 COMPLETE CBC W/AUTO DIFF WBC: CPT

## 2022-02-06 PROCEDURE — 97110 THERAPEUTIC EXERCISES: CPT

## 2022-02-06 RX ORDER — INSULIN GLARGINE 100 [IU]/ML
25 INJECTION, SOLUTION SUBCUTANEOUS 2 TIMES DAILY
Status: DISCONTINUED | OUTPATIENT
Start: 2022-02-06 | End: 2022-02-08 | Stop reason: HOSPADM

## 2022-02-06 RX ORDER — OXYCODONE HYDROCHLORIDE 5 MG/1
5 TABLET ORAL EVERY 6 HOURS PRN
Status: DISCONTINUED | OUTPATIENT
Start: 2022-02-06 | End: 2022-02-08 | Stop reason: HOSPADM

## 2022-02-06 RX ORDER — SODIUM CHLORIDE 9 MG/ML
INJECTION, SOLUTION INTRAVENOUS CONTINUOUS
Status: DISCONTINUED | OUTPATIENT
Start: 2022-02-06 | End: 2022-02-08 | Stop reason: HOSPADM

## 2022-02-06 RX ORDER — 0.9 % SODIUM CHLORIDE 0.9 %
500 INTRAVENOUS SOLUTION INTRAVENOUS ONCE
Status: COMPLETED | OUTPATIENT
Start: 2022-02-06 | End: 2022-02-06

## 2022-02-06 RX ADMIN — DICYCLOMINE HYDROCHLORIDE 10 MG: 10 CAPSULE ORAL at 11:47

## 2022-02-06 RX ADMIN — FLUTICASONE PROPIONATE 1 SPRAY: 50 SPRAY, METERED NASAL at 09:07

## 2022-02-06 RX ADMIN — SODIUM CHLORIDE 500 ML: 9 INJECTION, SOLUTION INTRAVENOUS at 09:06

## 2022-02-06 RX ADMIN — DICYCLOMINE HYDROCHLORIDE 10 MG: 10 CAPSULE ORAL at 16:27

## 2022-02-06 RX ADMIN — BUDESONIDE AND FORMOTEROL FUMARATE DIHYDRATE 2 PUFF: 80; 4.5 AEROSOL RESPIRATORY (INHALATION) at 09:07

## 2022-02-06 RX ADMIN — OXYCODONE HYDROCHLORIDE 5 MG: 5 TABLET ORAL at 06:09

## 2022-02-06 RX ADMIN — AMPICILLIN SODIUM AND SULBACTAM SODIUM 3000 MG: 2; 1 INJECTION, POWDER, FOR SOLUTION INTRAMUSCULAR; INTRAVENOUS at 11:47

## 2022-02-06 RX ADMIN — TRAZODONE HYDROCHLORIDE 150 MG: 100 TABLET ORAL at 21:10

## 2022-02-06 RX ADMIN — PREGABALIN 200 MG: 100 CAPSULE ORAL at 09:07

## 2022-02-06 RX ADMIN — SODIUM CHLORIDE: 9 INJECTION, SOLUTION INTRAVENOUS at 09:06

## 2022-02-06 RX ADMIN — OXYCODONE 5 MG: 5 TABLET ORAL at 11:45

## 2022-02-06 RX ADMIN — BUDESONIDE AND FORMOTEROL FUMARATE DIHYDRATE 2 PUFF: 80; 4.5 AEROSOL RESPIRATORY (INHALATION) at 21:11

## 2022-02-06 RX ADMIN — AMPICILLIN SODIUM AND SULBACTAM SODIUM 3000 MG: 2; 1 INJECTION, POWDER, FOR SOLUTION INTRAMUSCULAR; INTRAVENOUS at 06:05

## 2022-02-06 RX ADMIN — INSULIN GLARGINE 28 UNITS: 100 INJECTION, SOLUTION SUBCUTANEOUS at 09:09

## 2022-02-06 RX ADMIN — HYDROXYZINE HYDROCHLORIDE 10 MG: 10 TABLET ORAL at 21:10

## 2022-02-06 RX ADMIN — OXYCODONE 5 MG: 5 TABLET ORAL at 17:49

## 2022-02-06 RX ADMIN — MIRTAZAPINE 7.5 MG: 15 TABLET, FILM COATED ORAL at 21:11

## 2022-02-06 RX ADMIN — AMPICILLIN SODIUM AND SULBACTAM SODIUM 3000 MG: 2; 1 INJECTION, POWDER, FOR SOLUTION INTRAMUSCULAR; INTRAVENOUS at 00:11

## 2022-02-06 RX ADMIN — INSULIN LISPRO 2 UNITS: 100 INJECTION, SOLUTION INTRAVENOUS; SUBCUTANEOUS at 09:09

## 2022-02-06 RX ADMIN — OXYCODONE HYDROCHLORIDE 5 MG: 5 TABLET ORAL at 00:11

## 2022-02-06 RX ADMIN — DICYCLOMINE HYDROCHLORIDE 10 MG: 10 CAPSULE ORAL at 09:07

## 2022-02-06 RX ADMIN — PREGABALIN 200 MG: 100 CAPSULE ORAL at 21:10

## 2022-02-06 RX ADMIN — INSULIN LISPRO 1 UNITS: 100 INJECTION, SOLUTION INTRAVENOUS; SUBCUTANEOUS at 21:11

## 2022-02-06 RX ADMIN — ACETAMINOPHEN 650 MG: 325 TABLET ORAL at 17:50

## 2022-02-06 RX ADMIN — PANTOPRAZOLE SODIUM 20 MG: 40 TABLET, DELAYED RELEASE ORAL at 16:27

## 2022-02-06 RX ADMIN — VENLAFAXINE HYDROCHLORIDE 150 MG: 150 CAPSULE, EXTENDED RELEASE ORAL at 09:07

## 2022-02-06 RX ADMIN — AMPICILLIN SODIUM AND SULBACTAM SODIUM 3000 MG: 2; 1 INJECTION, POWDER, FOR SOLUTION INTRAMUSCULAR; INTRAVENOUS at 17:50

## 2022-02-06 RX ADMIN — PANTOPRAZOLE SODIUM 20 MG: 40 TABLET, DELAYED RELEASE ORAL at 09:07

## 2022-02-06 ASSESSMENT — PAIN - FUNCTIONAL ASSESSMENT: PAIN_FUNCTIONAL_ASSESSMENT: PREVENTS OR INTERFERES SOME ACTIVE ACTIVITIES AND ADLS

## 2022-02-06 ASSESSMENT — ENCOUNTER SYMPTOMS
EYE PAIN: 0
SHORTNESS OF BREATH: 0
COUGH: 0
PHOTOPHOBIA: 0
APNEA: 0
EYE DISCHARGE: 0
EYE ITCHING: 0
ABDOMINAL DISTENTION: 0
EYE REDNESS: 0
COLOR CHANGE: 1

## 2022-02-06 ASSESSMENT — PAIN SCALES - GENERAL
PAINLEVEL_OUTOF10: 10
PAINLEVEL_OUTOF10: 7
PAINLEVEL_OUTOF10: 2
PAINLEVEL_OUTOF10: 3
PAINLEVEL_OUTOF10: 10
PAINLEVEL_OUTOF10: 7

## 2022-02-06 ASSESSMENT — PAIN DESCRIPTION - ORIENTATION: ORIENTATION: RIGHT;LEFT

## 2022-02-06 ASSESSMENT — PAIN DESCRIPTION - LOCATION: LOCATION: FOOT

## 2022-02-06 ASSESSMENT — PAIN DESCRIPTION - FREQUENCY: FREQUENCY: CONTINUOUS

## 2022-02-06 ASSESSMENT — PAIN DESCRIPTION - PAIN TYPE: TYPE: ACUTE PAIN

## 2022-02-06 ASSESSMENT — PAIN DESCRIPTION - PROGRESSION: CLINICAL_PROGRESSION: NOT CHANGED

## 2022-02-06 ASSESSMENT — PAIN DESCRIPTION - DESCRIPTORS: DESCRIPTORS: ACHING;DISCOMFORT

## 2022-02-06 ASSESSMENT — PAIN DESCRIPTION - ONSET: ONSET: ON-GOING

## 2022-02-06 NOTE — PROGRESS NOTES
Physical Therapy  Facility/Department: [address] ORTHO/MED SURG  Daily Treatment Note  NAME: Ruben Briseno  : 1965  MRN: [id number]    Date of Service: 2022    Discharge Recommendations:  Patient would benefit from continued therapy after discharge   PT Equipment Recommendations  Equipment Needed: No (CTA)    Assessment   Body structures, Functions, Activity limitations: Decreased functional mobility ; Decreased strength;Decreased endurance; Increased pain  Assessment: Pt amb 25ft x2 with RW CGA. Pt is impulsive with poor safety awareness. Unsafe to return to prior living arrangements at this time d/t mobility deficits. Pt would benefit from aggressive PT after d/c to address deficits. Prognosis: Good  Decision Making: Medium Complexity  Barriers to Learning: impulsive, dec safe awareness  REQUIRES PT FOLLOW UP: Yes  Activity Tolerance  Activity Tolerance: Patient limited by fatigue;Patient limited by pain; Other (WB restricitons on R LE)  Activity Tolerance: Pt required multiple rest breaks during seated exercises. Patient Diagnosis(es): The primary encounter diagnosis was Right foot infection. Diagnoses of Elevated d-dimer, Ulcer of left foot, with fat layer exposed ([address] and Status post incision and drainage were also pertinent to this visit.      has a past medical history of Acid reflux, Acute cystitis with hematuria, Acute non-recurrent maxillary sinusitis, Asthma, Bipolar 1 disorder ([address] Bipolar disorder, mixed ([address] BMI 34.0-34.9,adult, Cannabis use disorder, severe, dependence ([address] Cerebrovascular accident (CVA) ([address] Chest pain, Chronic renal insufficiency, Cocaine abuse ([address] COVID-19 virus RNA test result unknown, DDD (degenerative disc disease), cervical, Diabetes mellitus ([address] Dizziness, Fibromyalgia, History of stroke, Homicidal ideation, Hyperglycemia, Hypertension, Hypotension, IDDM (insulin dependent diabetes mellitus), Lupus ([address] Migraine, Neuropathy, Neuropathy, Polysubstance abuse ([facility] Post traumatic stress disorder (PTSD), Posttraumatic stress disorder, Recurrent depression ([facility] Recurrent major depressive disorder, in partial remission ([facility] Screening mammogram, encounter for, Severe recurrent major depression with psychotic features ([facility] Severe recurrent major depression without psychotic features ([facility] Stroke (cerebrum) ([facility] Stroke ([facility] Suicidal ideation, Suicidal intent, Vitamin D deficiency, and White matter changes. has a past surgical history that includes Abscess Drainage; chest tube insertion; Abdomen surgery; Cataract removal with implant (Bilateral); LASIK (Bilateral); Finger amputation (Left, 03/13/2021); Hand surgery (Right, 09/14/2021); Hand surgery (Right, 09/15/2021); Finger amputation (Right, 10/11/2021); Foot surgery (Right, 01/27/2022); Foot Debridement (Right, 1/27/2022); and Insert Midline Catheter (2/3/2022). Restrictions  Restrictions/Precautions  Restrictions/Precautions: Weight Bearing,Up as Tolerated,Fall Risk  Required Braces or Orthoses?: Yes  Lower Extremity Weight Bearing Restrictions  Right Lower Extremity Weight Bearing:  (Heel WB)  Partial Weight Bearing Percentage Or Pounds: WB to R heel only  Left Lower Extremity Weight Bearing: Weight Bearing As Tolerated  Upper Extremity Weight Bearing Restrictions  Other: Weight bearing to heel of right foot. WBAT to left foot -orders per podiatry 1/29/22. Required Braces or Orthoses  Right Lower Extremity Brace:  (\"Foot/ankle post surgical boot\" Please apply to b/l feet for weight bearing)  Left Lower Extremity Brace:  (sx shoe)  Position Activity Restriction  Other position/activity restrictions: Weight bearing to heel of right foot. WBAT to left foot  Subjective   General  Chart Reviewed: Yes  Response To Previous Treatment: Patient with no complaints from previous session. Family / Caregiver Present: No  Subjective  Subjective: RN and pt agreeable to PT.  Pt supine in bed at start of session with 10/10 c/o pain. . Surgical shoe donned to L foot prior to mobility. Pain Screening  Patient Currently in Pain: Yes  Pain Assessment  Pain Assessment: 0-10  Pain Level: 7  Pain Type: Acute pain  Pain Location: Foot  Pain Orientation: Right;Left  Pain Descriptors: Aching;Discomfort  Pain Frequency: Continuous  Pain Onset: On-going  Clinical Progression: Not changed  Functional Pain Assessment: Prevents or interferes some active activities and ADLs  Non-Pharmaceutical Pain Intervention(s): Distraction  Response to Pain Intervention: Patient Satisfied  POSS Score (Patient Ctrl Analgesia): 1  Vital Signs  BP Location: Right upper arm  Level of Consciousness: Alert (0)  Patient Currently in Pain: Yes  Oxygen Therapy  O2 Device: None (Room air)  Pre Treatment Pain Screening  Intervention List: Patient able to continue with treatment    Orientation  Orientation  Overall Orientation Status: Within Normal Limits  Cognition      Objective   Bed mobility  Rolling to Left: Supervision  Rolling to Right: Supervision  Supine to Sit: Supervision  Sit to Supine: Supervision  Scooting: Stand by assistance  Comment: Impulsive, cues for safety and re-direction to tasks. Transfers  Sit to Stand: Contact guard assistance  Stand to sit: Contact guard assistance  Comment: Pt performed STS from bed with RW for UE support, fair return for hand placement. Fair return of WB restrictions during STS with cueing. Ambulation  Ambulation?: Yes  WB Status: Heel weight bearing R LE, WBAT L LE, B surgical shoes on   More Ambulation?: No  Ambulation 1  Surface: level tile  Device: Rolling Walker  Assistance: Contact guard assistance  Quality of Gait: no LOB noted, fatiques easily, questionable compliance with Heel WB R LE. . pt impulsive, max cues for safety and WB precautions. Gait Deviations: Slow Sonia;Decreased step length  Distance: 25ft x2  Comments: Pt amb 25ft x2 with RW CGA.  Required cueing for WB restrictions with fair return. Stairs/Curb  Stairs?: No     Balance  Posture: Good  Sitting - Static: Good  Sitting - Dynamic: Fair;+  Standing - Static: Fair;+  Standing - Dynamic: Fair;+  Comments: Standing balance assessed with RW. Exercises  Straight Leg Raise: x15 BLE  Heelslides: x15 BLE  Hip Flexion: x15 BLE  Hip Abduction: x15 BLE  Knee Short Arc Quad: x15 BLE  Ankle Pumps: x20 BLE  Comments: Pt performed exercises while supine/seated EOB. Deferred further exercises d/t fatigue and pain. Other exercises  Other exercises?: No                        G-Code     OutComes Score                                                     AM-PAC Score             Goals  Short term goals  Time Frame for Short term goals: 14 visits  Short term goal 1: transfers with SBA and RW  Short term goal 2: amb 75 ft with a RW x SBA with NWB RLE, WBAT LLE with sx shoe  Short term goal 3: to be independent with bed mobility  Short term goal 4: 20 min exercise program x SBA  Patient Goals   Patient goals : Return home    Plan    Plan  Times per week: 5-6x wk  Times per day: Daily  Current Treatment Recommendations: Strengthening,Functional Mobility Training,Gait Training,Safety Education & Training,Endurance Training,Pain Management,Balance Training,IADL Training,Home Exercise Program,Neuromuscular Re-education,Transfer Training,Patient/Caregiver Education & Training,ADL/Self-care Training  Safety Devices  Type of devices:  All fall risk precautions in place,Gait belt,Call light within reach,Left in bed,Nurse notified  Restraints  Initially in place: No     Therapy Time   Individual Concurrent Group Co-treatment   Time In 1243         Time Out 1308         Minutes [address] [place] [address] Dr, PT

## 2022-02-06 NOTE — PROGRESS NOTES
[facility]  Progress Note    Date:   2/6/2022  Patient name:  Polina Wilkes  Date of admission:  1/22/2022  4:43 PM  MRN:   [id number]  YOB: 1965    SUBJECTIVE/Last 24 hours update:     Patient seen and examined at the bed side. Patient states pain is improved today. No new complaints. Patient has new MARTIN, states that she does not drink much throughout the day. Will get 500cc bolus and then maintenance fluids. Avoid Toradol. Roxicodone frequency to be changed to q6h, as patient is currently on day 10 of Roxicodone. Notes from nursing staff and Consults had been reviewed, and the overnight progress had been checked with the nursing staff as well.     REVIEW OF SYSTEMS:      CONSTITUTIONAL:  no fevers, no headcahes  EYES: negative for blury vision  HEENT: No headaches, No nasal congestion, no difficulty swallowing  RESPIRATORY:negative for dyspnea, no wheezing, no Cough  CARDIOVASCULAR: negative for chest pain, no palpitations  GASTROINTESTINAL: no nausea, no vomiting, no change in bowel habits, no abdominal pain   GENITOURINARY: negative for dysuria, no hematuria   MUSCULOSKELETAL: + pain in R foot   NEUROLOGICAL: No  Weakness or numbness    PAST MEDICAL HISTORY:      has a past medical history of Acid reflux, Acute cystitis with hematuria, Acute non-recurrent maxillary sinusitis, Asthma, Bipolar 1 disorder ([address] Bipolar disorder, mixed ([address] BMI 34.0-34.9,adult, Cannabis use disorder, severe, dependence ([address] Cerebrovascular accident (CVA) ([address] Chest pain, Chronic renal insufficiency, Cocaine abuse ([address] COVID-19 virus RNA test result unknown, DDD (degenerative disc disease), cervical, Diabetes mellitus ([address] Dizziness, Fibromyalgia, History of stroke, Homicidal ideation, Hyperglycemia, Hypertension, Hypotension, IDDM (insulin dependent diabetes mellitus), Lupus ([address] Migraine, Neuropathy, Neuropathy, Polysubstance abuse ([address] Post traumatic stress disorder (PTSD), Posttraumatic stress disorder, Recurrent depression ([address] Recurrent major depressive disorder, in partial remission ([address] Screening mammogram, encounter for, Severe recurrent major depression with psychotic features ([address] Severe recurrent major depression without psychotic features ([address] Stroke (cerebrum) ([address] Stroke ([address] Suicidal ideation, Suicidal intent, Vitamin D deficiency, and White matter changes. PAST SURGICAL HISTORY:      has a past surgical history that includes Abscess Drainage; chest tube insertion; Abdomen surgery; Cataract removal with implant (Bilateral); LASIK (Bilateral); Finger amputation (Left, 03/13/2021); Hand surgery (Right, 09/14/2021); Hand surgery (Right, 09/15/2021); Finger amputation (Right, 10/11/2021); Foot surgery (Right, 01/27/2022); Foot Debridement (Right, 1/27/2022); and Insert Midline Catheter (2/3/2022). SOCIAL HISTORY:      reports that she has never smoked. She has never used smokeless tobacco. She reports previous alcohol use. She reports current drug use. Drugs: Marijuana (Weed) and Cocaine. FAMILY HISTORY:     family history includes Diabetes in her brother, father, mother, and sister; No Known Problems in her sister; Other in her son; Stroke in her mother. HOME MEDICATIONS:      Prior to Admission medications    Medication Sig Start Date End Date Taking? Authorizing Provider   oxyCODONE (ROXICODONE) 5 MG immediate release tablet Take 1 tablet by mouth every 4 hours as needed for Pain for up to 5 days. 2/3/22 2/8/22 Yes Mesfin Sherman MD   ertapenem [facility]) infusion Infuse 1,000 mg intravenously every 24 hours for 14 days Compound per protocol. 1/30/22 2/13/22 Yes Maylin Pozo MD   pregabalin (LYRICA) 200 MG capsule Take 1 capsule by mouth 2 times daily for 30 days.  1/20/22 2/19/22  Sacha Harris MD   insulin glargine (LANTUS SOLOSTAR) 100 UNIT/ML injection pen Inject 30 Units into the skin 2 times daily 1/20/22   Nura You MD   fluticasone (FLONASE) 50 MCG/ACT nasal spray 1 spray by Each Nostril route daily 12/22/21   Tommy Jay MD   acetaminophen (TYLENOL) 500 MG tablet Take 2 tablets by mouth 3 times daily as needed for Pain 12/22/21   Tommy Jay MD   dicyclomine (BENTYL) 10 MG capsule Take 1 capsule by mouth 4 times daily 12/17/21   Tommy Jay MD   metFORMIN (GLUCOPHAGE) 1000 MG tablet Take 1 tablet by mouth 2 times daily (with meals) 12/17/21   Tommy Jay MD   mirtazapine (REMERON) 7.5 MG tablet Take 1 tablet by mouth nightly 12/17/21   Tommy Jay MD   pantoprazole (PROTONIX) 20 MG tablet Take 1 tablet by mouth 2 times daily (before meals) 12/17/21   Tommy Jay MD   traZODone (DESYREL) 150 MG tablet Take 1 tablet by mouth nightly 12/17/21   Tommy Jay MD   venlafaxine (EFFEXOR XR) 150 MG extended release capsule Take 1 capsule by mouth daily (with breakfast) 12/17/21   Tommy Jay MD   ibuprofen (ADVIL;MOTRIN) 800 MG tablet Take 1 tablet by mouth every 8 hours as needed for Pain 11/15/21 11/29/21  Jacob Del Rio MD   Lancets MISC 1 each by Does not apply route 3 times daily 11/15/21   Jacob Del Rio MD   Alcohol Swabs 70 % PADS Use 1 swab 3 times daily as needed 11/15/21   Jacob Del Rio MD   blood glucose monitor strips Test 3 times a day & as needed for symptoms of irregular blood glucose. Dispense sufficient amount for indicated testing frequency plus additional to accommodate PRN testing needs.  11/8/21   Carrington Chris MD   Lancets MISC 1 each by Does not apply route 3 times daily 11/8/21   Carrington Chris MD   Insulin Pen Needle ([employer] PEN NEEDLES 31G) 31G X 8 MM MISC 1 each by Does not apply route daily 11/8/21   Cecilia Colmenares MD   FREESTYLE LITE strip 1 each by Does not apply route 3 times daily 11/11/20   Tommy Jay MD   albuterol sulfate  (90 Base) MCG/ACT inhaler Inhale 2 puffs into the lungs every 4 hours as needed for Wheezing 10/20/20 9/22/21  Génesis Porras MD   FLOVENT  MCG/ACT inhaler Inhale 2 puffs into the lungs 2 times daily 10/20/20   Génesis Porras MD   budesonide-formoterol (SYMBICORT) 80-4.5 MCG/ACT AERO Inhale 2 puffs into the lungs 2 times daily 10/20/20   Génesis Porras MD       ALLERGIES:     Bactrim [sulfamethoxazole-trimethoprim] and Adhesive tape      OBJECTIVE:       Vitals:    02/05/22 0600 02/05/22 0822 02/05/22 1935 02/06/22 0759   BP:   (!) 140/89 (!) 144/113   Pulse:   89    Resp:   18 17   Temp:  96 °F (35.6 °C) 98.8 °F (37.1 °C) 97.2 °F (36.2 °C)   TempSrc:  Axillary Oral Oral   SpO2:   99% 92%   Weight: 251 lb 9.6 oz (114.1 kg)      Height:           No intake or output data in the 24 hours ending 02/06/22 0909    PHYSICAL EXAM:  General Appearance  Alert , awake , not in acute distress  HEENT - Head is normocephalic, atraumatic. Lungs - Bilateral equal air entry , no wheezes, rales or rhonchi, aeration good  Cardiovascular - Heart sounds are normal.  Regular rhythm, normal rate without murmur, gallop or rub.   Abdomen - Soft, nontender, nondistended, no masses or organomegaly  Neurologic - There are no new focal motor or sensory deficits  Skin - No bruising or bleeding on exposed skin area  Extremities - R foot bandaged     DIAGNOSTICS:     Laboratory Testing:    Recent Results (from the past 24 hour(s))   POC Glucose Fingerstick    Collection Time: 02/05/22 10:59 AM   Result Value Ref Range    POC Glucose 120 (H) 65 - 105 mg/dL   POC Glucose Fingerstick    Collection Time: 02/05/22  4:35 PM   Result Value Ref Range    POC Glucose 144 (H) 65 - 105 mg/dL   POC Glucose Fingerstick    Collection Time: 02/05/22  8:40 PM   Result Value Ref Range    POC Glucose 104 65 - 105 mg/dL   Basic Metabolic Panel w/ Reflex to MG    Collection Time: 02/06/22  6:23 AM   Result Value Ref Range    Glucose 142 (H) 70 - 99 mg/dL    BUN 25 (H) 6 - 20 mg/dL    CREATININE 1.22 (H) 0.50 - 0.90 mg/dL    Bun/Cre Ratio NOT REPORTED 9 - 20    Calcium 8.2 (L) 8.6 - 10.4 mg/dL    Sodium 138 135 - 144 mmol/L    Potassium 4.6 3.7 - 5.3 mmol/L    Chloride 105 98 - 107 mmol/L    CO2 23 20 - 31 mmol/L    Anion Gap 10 9 - 17 mmol/L    GFR Non-African American 46 (L) >60 mL/min    GFR  56 (L) >60 mL/min    GFR Comment          GFR Staging NOT REPORTED    CBC auto differential    Collection Time: 02/06/22  6:23 AM   Result Value Ref Range    WBC 6.3 3.5 - 11.3 k/uL    RBC 2.24 (L) 3.95 - 5.11 m/uL    Hemoglobin 7.1 (L) 11.9 - 15.1 g/dL    Hematocrit 22.6 (L) 36.3 - 47.1 %    .9 82.6 - 102.9 fL    MCH 31.7 25.2 - 33.5 pg    MCHC 31.4 28.4 - 34.8 g/dL    RDW 14.6 (H) 11.8 - 14.4 %    Platelets 256 (H) 196 - 453 k/uL    MPV 10.2 8.1 - 13.5 fL    NRBC Automated 0.3 (H) 0.0 per 100 WBC    Differential Type NOT REPORTED     WBC Morphology NOT REPORTED     RBC Morphology ANISOCYTOSIS PRESENT     Platelet Estimate NOT REPORTED     Immature Granulocytes 0 0 %    Seg Neutrophils 48 36 - 66 %    Lymphocytes 41 24 - 44 %    Monocytes 9 (H) 1 - 7 %    Eosinophils % 2 1 - 4 %    Basophils 0 0 - 2 %    Absolute Immature Granulocyte 0.00 0.00 - 0.30 k/uL    Segs Absolute 3.02 1.8 - 7.7 k/uL    Absolute Lymph # 2.58 1.0 - 4.8 k/uL    Absolute Mono # 0.57 0.1 - 0.8 k/uL    Absolute Eos # 0.13 0.0 - 0.4 k/uL    Basophils Absolute 0.00 0.0 - 0.2 k/uL    Morphology ANISOCYTOSIS PRESENT     Morphology 1+ POLYCHROMASIA    POC Glucose Fingerstick    Collection Time: 02/06/22  6:29 AM   Result Value Ref Range    POC Glucose 145 (H) 65 - 105 mg/dL       Current Facility-Administered Medications   Medication Dose Route Frequency Provider Last Rate Last Admin    oxyCODONE (ROXICODONE) immediate release tablet 5 mg  5 mg Oral Q6H PRN Jenny Harrison MD        0.9 % sodium chloride bolus  500 mL IntraVENous Once Jenny Harrison  mL/hr at 02/06/22 0906 500 mL at 02/06/22 0906    0.9 % sodium chloride infusion   IntraVENous Continuous Yancy Braswell  mL/hr at 02/06/22 0906 New Bag at 02/06/22 0906    sennosides-docusate sodium (SENOKOT-S) 8.6-50 MG tablet 2 tablet  2 tablet Oral Daily PRN Maximino Gonzales MD        insulin glargine (LANTUS) injection vial 28 Units  28 Units SubCUTAneous BID Yancy Braswell MD   28 Units at 02/05/22 0840    hydrOXYzine (ATARAX) tablet 10 mg  10 mg Oral TID PRN Sangeeta Pulido MD   10 mg at 02/05/22 2112    ampicillin-sulbactam (UNASYN) 3000 mg ivpb minibag  3,000 mg IntraVENous Q6H Maylin Stout MD   Stopped at 02/06/22 0708    simethicone (MYLICON) chewable tablet 80 mg  80 mg Oral Q6H PRN Binta Galeana MD   80 mg at 02/01/22 1704    enoxaparin (LOVENOX) injection 30 mg  30 mg SubCUTAneous BID Felicitas Alfaro DPM   30 mg at 02/01/22 2028    albuterol sulfate  (90 Base) MCG/ACT inhaler 2 puff  2 puff Inhalation Q4H PRN Felicitas Alfaro DPM   2 puff at 02/01/22 1712    budesonide-formoterol (SYMBICORT) 80-4.5 MCG/ACT inhaler 2 puff  2 puff Inhalation BID Felicitas Alfaro DPM   2 puff at 02/06/22 3402    dicyclomine (BENTYL) capsule 10 mg  10 mg Oral 4x Daily Felicitas Alfaro DPM   10 mg at 02/06/22 0907    fluticasone (FLONASE) 50 MCG/ACT nasal spray 1 spray  1 spray Each Nostril Daily Felicitas Alfaro DPM   1 spray at 02/06/22 0907    mirtazapine (REMERON) tablet 7.5 mg  7.5 mg Oral Nightly Felicitas Alfaro DPM   7.5 mg at 02/05/22 2112    pantoprazole (PROTONIX) tablet 20 mg  20 mg Oral BID AC Felicitas Alfaro DPM   20 mg at 02/06/22 7100    pregabalin (LYRICA) capsule 200 mg  200 mg Oral BID Felicitas Alfaro DPM   200 mg at 02/06/22 4485    traZODone (DESYREL) tablet 150 mg  150 mg Oral Nightly June SARA Alfaro   150 mg at 02/05/22 2112    venlafaxine (EFFEXOR XR) extended release capsule 150 mg  150 mg Oral Daily with breakfast Felicitas SARA Alfaro   150 mg at 02/06/22 8081    sodium chloride flush 0.9 % injection 5-40 mL  5-40 mL IntraVENous 2 times per day Felicitas Alfaro DPM   10 mL at 02/05/22 2113    sodium chloride flush 0.9 % injection 5-40 mL  5-40 mL IntraVENous PRN Meenakshi Drain, DPM        0.9 % sodium chloride infusion  25 mL IntraVENous PRN Meenakshi Drain, DPM        ondansetron (ZOFRAN-ODT) disintegrating tablet 4 mg  4 mg Oral Q8H PRN Meenakshi Drain, DPM   4 mg at 01/29/22 1201    Or    ondansetron (ZOFRAN) injection 4 mg  4 mg IntraVENous Q6H PRN Meenakshi Drain, DPM        polyethylene glycol (GLYCOLAX) packet 17 g  17 g Oral Daily PRN Meenakshi Drain, DPM   17 g at 02/05/22 0840    acetaminophen (TYLENOL) tablet 650 mg  650 mg Oral Q6H PRN Meenakshi Drain, DPM   650 mg at 02/05/22 0840    Or    acetaminophen (TYLENOL) suppository 650 mg  650 mg Rectal Q6H PRN Meenakshi Drain, DPM        potassium chloride (KLOR-CON M) extended release tablet 40 mEq  40 mEq Oral PRN Meenakshi Drain, DPM        Or    potassium bicarb-citric acid (EFFER-K) effervescent tablet 40 mEq  40 mEq Oral PRN Meenakshi Drain, DPM        Or    potassium chloride 10 mEq/100 mL IVPB (Peripheral Line)  10 mEq IntraVENous PRN Meenakshi Drain, DPM        insulin lispro (HUMALOG) injection vial 0-12 Units  0-12 Units SubCUTAneous TID WC Meenakshi Drain, DPM   2 Units at 02/04/22 1746    insulin lispro (HUMALOG) injection vial 0-6 Units  0-6 Units SubCUTAneous Nightly Meenakshi Drain, DPM   2 Units at 02/04/22 2040    glucose (GLUTOSE) 40 % oral gel 15 g  15 g Oral PRN Meenakshi Drain, DPM   15 g at 01/26/22 0350    dextrose 50 % IV solution  12.5 g IntraVENous PRN Meenakshi Drain, DPM        glucagon (rDNA) injection 1 mg  1 mg IntraMUSCular PRN Meenakshi Drain, DPM        dextrose 5 % solution  100 mL/hr IntraVENous PRN Meenakshi Drain, DPM           ASSESSMENT:     Active Problems:    Type 2 diabetes mellitus without complication, with long-term current use of insulin ([facility])    Right foot infection    Cellulitis and abscess of toe of right foot    Abscess of right foot    Bilateral cellulitis of lower leg related to related to  uncontrolled diabetes    Right foot ulcer, with fat layer exposed ([address]    Ulcer of left foot, with fat

## 2022-02-06 NOTE — PROGRESS NOTES
pts blood sugar 63 pt asymptomatic.  Pt refusing glucose gels pt given peanut butter and osmin crackers will recheck blood sugar in 15 minutes dr notified stated hold lantus tonight and repeat sugar in 1/2 hour bs now [id number] is [id number]

## 2022-02-06 NOTE — CARE COORDINATION
Call to [facility] to check status of precert, message left with request for call back regarding status of precert, request for call back on [employer] answering machine.

## 2022-02-06 NOTE — PROGRESS NOTES
Occupational [address] [employer]  Occupational Therapy Not Seen Note    DATE: 2022    NAME: Priya Luong  MRN: [id number]   : 1965      Patient not seen this date for Occupational Therapy due to:    Patient Declined: stating, \"I'm tired\"    Next Scheduled Treatment: Attempt at later date    Electronically signed by LUCAS Boyd on 2022 at 3:48 PM

## 2022-02-06 NOTE — PLAN OF CARE
Problem: Pain:  Goal: Pain level will decrease  Description: Pain level will decrease  2/6/2022 1153 by Meena Correa RN  Outcome: Ongoing  2/6/2022 1138 by Meena Correa RN  Outcome: Ongoing  Goal: Control of acute pain  Description: Control of acute pain  2/6/2022 1153 by Meena Correa RN  Outcome: Ongoing  2/6/2022 1138 by Meena Correa RN  Outcome: Ongoing  Goal: Control of chronic pain  Description: Control of chronic pain  2/6/2022 1153 by Meena Correa RN  Outcome: Ongoing  2/6/2022 1138 by Meena Correa RN  Outcome: Ongoing     Problem: Falls - Risk of:  Goal: Will remain free from falls  Description: Will remain free from falls  2/6/2022 1153 by Meena Correa RN  Outcome: Ongoing  2/6/2022 1138 by Meena Correa RN  Outcome: Ongoing  Goal: Absence of physical injury  Description: Absence of physical injury  2/6/2022 1153 by Meena Correa RN  Outcome: Ongoing  2/6/2022 1138 by Meena Correa RN  Outcome: Ongoing     Problem: Musculor/Skeletal Functional Status  Goal: Highest potential functional level  2/6/2022 1153 by Meena Correa RN  Outcome: Ongoing  2/6/2022 1138 by Meena Correa RN  Outcome: Ongoing     Problem: Nutrition  Goal: Optimal nutrition therapy  2/6/2022 1153 by Meena Correa RN  Outcome: Ongoing  2/6/2022 1138 by Meena Correa RN  Outcome: Ongoing     Problem: Skin Integrity:  Goal: Will show no infection signs and symptoms  Description: Will show no infection signs and symptoms  2/6/2022 1153 by Meena Correa RN  Outcome: Ongoing  2/6/2022 1138 by Meena Correa RN  Outcome: Ongoing  Goal: Absence of new skin breakdown  Description: Absence of new skin breakdown  2/6/2022 1153 by Meena Correa RN  Outcome: Ongoing  2/6/2022 1138 by Meena Correa RN  Outcome: Ongoing

## 2022-02-06 NOTE — PROGRESS NOTES
[facility] -   Infectious diseases evaluation    Progress Note    admission date 1/22/2022    reason for consultation:   Diabetic foot infection    Impression :   Current:  · Right diabetic foot infection, GAS  · Cellulitis both feet  · bandemia  · Bacteriuria vs UTI kleb S ancef  · Completed Rx 1-29-22  · CRP elevation    Discussion / summary of stay / plan of care   · Foot cx MSSA (Res to clinda ) and GAS, Finegoldia and Aerococcus  · MRI suggestive of a midfoot collection - Podiatry feels it is a phlegmon  · Plan to DC on invanz 1 g daily x 2 weeks w office FU    Recommendations   · Unasyn 3 gm IV 1 6 Hr. Start date 2-1-22  · Plan for discharge on Ertapenem as per Dr Crissy Chirinos x 2 weeks  Office f/up in 2 weeks with Dr Crissy Chirinos for infection. Please call [phone] for appointment    Infection Control Recommendations   · [place] Precautions  · Contact Isolation       Antimicrobial Stewardship Recommendations   · Simplification of therapy  · Targeted therapy  Coordination ofOutpatient Care:   · Estimated Length of IV antimicrobials:  · Patient will need Midline / picc Catheter Insertion:   · Patient will need SNF:  · Patient will need outpatient wound care:     History of Present Illness:   Initial history:  Donnie Castañeda is a 49y.o.-year-old female with diabetes very well-known to my service, stepped on a piece of glass a few days prior to admission presented with swelling and drainage from right foot, culture came back with group A strep,  Patient has neuropathy from diabetes  WBC elevated 14,  sed [id number],     Podiatry debrided the superficial ulcer and felt it was not too deep yet there was cellulitis of both feet.   Hemoglobin A1c was 10    MRI of the left foot no osteomyelitis  MRI of the right foot no osteomyelitis    Leukocytosis responded to antibiotics, infectious consulted for antibiotic management  Patient is on cefepime and clindamycin    R foot very swollen and tender to light touch - suspect deeper involvement and needs more I/D    1/26  Pt is afebrile and stable this morning. WBC trending up (14 today). UC 1/24 growing Klebsiella. CT right leg 1/25 shows an abscess  Planned for I/D on 1/27  Pain still ++    1/27  Pt seen and examined at bedside, afebrile and hypotensive this morning. Endorsed a lot of pain overnight. WBC improved to 11   To go to OR today for I+D of R foot abscess. Started on Keflex until 1/29 for UTI  Will monitor on antibiotics and await podiatry recs    1/28  Pt afebrile and stable s/p I+D right foot yesterday. Pt c/o pain this morning but is managing. Pt also endorsing abdominal discomfort. XR Abd showing increased stool burden. WBC did increase to 15 from 11 yesterday. Wound Cx 1/27: pending but GPC in pairs seen. Will monitor on Keflex and Clinda    1/29  No fever  Lab: MSSA in wound , but clinda is R  Switching to ceftriaxone  Foot better - less edema and drainage -tender still    1/30  Right foot  but discharge is minimal from it. Otherwise she has no abdominal pain or diarrhea CRP is improved 54    1/31  Alert appropriate, right foot continues to be swollen very tender to touch and it is painful. The wound is not draining much though. No redness noticed. Podiatry watching at this point. Continue antibiotic  WBC is 10 and CRP continues to be 53 has not improved in today    2/1  Right foot remains tender to light touch over the sole and over the first metatarsal  Edema slightly better over the ankle and the dorsum but continues to be present over the shoulder without any drainage  Discussed with Dr. Regina Woodard. , he will wait another few days and allow the antibiotics to work before deciding further procedure  Cultures are now growing Aerococcus and Mechelle via, on top of the MSSA and the group A strep    2/2  Foot remains very tender around the hallux and the small area, same areas.   Not improved  MRI suggestive of a collection that could be an abscess    2/3  Foot tenderness improved   Per podiatry this is a phlegmon on MRI and not an ABSCESS  Pt agreed to rehab - x few weeks  Will DC on invanz 2-3 weeks and FU office and anticipate po AB at some point -  Also FU w podiatry office to FU on progress of the foot    2/4  Pt is feeling much better, denies any other active complaints apart from the pain and discomfort on the right foot. No purulent discharge visible. Tenderness improved  Ongoing discharge planning to SNF. Precert restarted. Awaiting placement. 2/5   Pain is under control with the current regimen. Its mildly tender. Minimal serous discharge present  No significant change in the clinical status of the patient. Pt working with PT. Stable for discharge, ongoing precert. CURRENT EVALUATION : 2/6/2022      Afebrile, VS stable    She feels much better. Working with PT/OT. Has daily dressing change. No surgical intervention planned by Podiatry. She had mild MARTIN, encouraged to drink adequate amount of fluids  She denies any urinary complaints. Interval changes  2/6/2022     BP (!) 144/113   Pulse 88   Temp 97.2 °F (36.2 °C) (Oral)   Resp 17   Ht 5' 6\" (1.676 m)   Wt 251 lb 9.6 oz (114.1 kg)   LMP  (LMP Unknown)   SpO2 92%   BMI 40.61 kg/m²        Summary of relevant labs:2/6/2022     WBC 14 > 11>15-10->7.5->6.3  Hb 7.1  Plat 476        -54->53    Creat 1.22   BUN 25     Micro:  Wound Cx 1/27: GPC pairs/GAS, light growth of MSSA, light growth of anaerococcus prevotii and finegoldia jessica      Uc 1/24: Klebsiella    Imaging:  MRI of the right foot 2/2  Soft tissue defect along the plantar and medial surface of the midfoot with   suspected irregular fluid collection measuring 4.4 x 1.2 x 1.8 cm, which may   represent an abscess or phlegmon. XR abd 1/27: Increased stool burden seen diffusely throughout the colon suggesting   clinical presentation of constipation.  Phleboliths seen on both sides of the   pelvis. CT R foot 1/25:  1. Shallow soft tissue ulceration plantar and medial to the 1st metatarsal   base with an underlying area of possible abscess versus phlegmon measuring   2.8 x 1.1 x 0.9 cm.   2. Extensive subcutaneous fat stranding compatible with cellulitis.  No soft   tissue gas. 3. No evidence of osteomyelitis or other acute osseous abnormality. U/S renal 1/25: Unremarkable    MRI R foot 1/23:  Subcutaneous edema and swelling, most notably affecting the dorsum of the   foot, which may reflect underlying cellulitis.  No discrete organized fluid   collection is seen within the limitations of a noncontrast study.       No imaging features of acute osteomyelitis appreciated. MRI L foot: 1/23:   Limited evaluation due to patient motion artifact on several sequences.       Skin thickening and subcutaneous edema seen within the plantar soft tissues   of the foot at the level of the proximal metatarsals, which may reflect   underlying cellulitis.  Within the limitations of a noncontrast study, no   discrete organized fluid collection is seen.       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 I have personally reviewed the past medical history, past surgical history, medications, social history, and family history, and I haveupdated the database accordingly. Allergies:   Bactrim [sulfamethoxazole-trimethoprim] and Adhesive tape     Review of Systems:     Review of Systems   Constitutional: Negative for activity change, appetite change, chills, diaphoresis and fatigue. HENT: Negative for congestion. Eyes: Negative for photophobia, pain, discharge, redness and itching. Respiratory: Negative for apnea, cough and shortness of breath. Cardiovascular: Negative for chest pain. Gastrointestinal: Negative for abdominal distention.    Endocrine: Negative for cold intolerance, polydipsia and polyphagia. Genitourinary: Negative for dysuria and flank pain. Musculoskeletal: Negative for arthralgias. Skin: Positive for color change and wound. Allergic/Immunologic: Negative for environmental allergies, food allergies and immunocompromised state. Neurological: Negative for dizziness, tremors, seizures, syncope, light-headedness, numbness and headaches. Hematological: Negative for adenopathy. Psychiatric/Behavioral: Negative for agitation. Physical Examination :       Physical Exam  Constitutional:       General: She is not in acute distress. Appearance: Normal appearance. She is not ill-appearing, toxic-appearing or diaphoretic. HENT:      Head: Normocephalic and atraumatic. Nose: Nose normal. No congestion or rhinorrhea. Eyes:      General: No scleral icterus. Pupils: Pupils are equal, round, and reactive to light. Cardiovascular:      Rate and Rhythm: Normal rate and regular rhythm. Heart sounds: Normal heart sounds. No murmur heard. Pulmonary:      Effort: No respiratory distress. Breath sounds: Normal breath sounds. No stridor. No wheezing. Abdominal:      General: There is no distension. Palpations: Abdomen is soft. There is no mass. Tenderness: There is no abdominal tenderness. Hernia: No hernia is present. Genitourinary:     Comments: Urine fatuma  Musculoskeletal:         General: Swelling present. No tenderness, deformity or signs of injury. Cervical back: Neck supple. No rigidity or tenderness. Skin:     General: Skin is dry. Coloration: Skin is not jaundiced or pale. Findings: Erythema present. No bruising, lesion or rash. Neurological:      Mental Status: She is alert and oriented to person, place, and time. Mental status is at baseline. Cranial Nerves: No cranial nerve deficit. Motor: No weakness.    Psychiatric:         Mood and Affect: Mood normal.         Thought Sha Hooks MD at [facility] Right 10/11/2021    AMPUTATION RIGHT INDEX FINGER performed by Gloria Lea MD at [facility] Right 1/27/2022    FOOT ABSCESS INCISION AND DRAINAGE  (PULSE LAVAGE, CULTURE SWABS) performed by Dalton Coleman DPM at [place] [address]. Right 01/27/2022    FOOT ABSCESS INCISION AND DRAINAGE (PULSE LAVAGE, CULTURE SWABS) - Right    HAND SURGERY Right 09/14/2021    I&D INDEX FINGER performed by Gloria Lea MD at [facility] Right 09/15/2021    I&D INDEX FINGER performed by Gloria Lea MD at [place] [address]  2/3/2022         LASIK Bilateral        Medications:      insulin glargine  28 Units SubCUTAneous BID    ampicillin-sulbactam  3,000 mg IntraVENous Q6H    enoxaparin  30 mg SubCUTAneous BID    budesonide-formoterol  2 puff Inhalation BID    dicyclomine  10 mg Oral 4x Daily    fluticasone  1 spray Each Nostril Daily    mirtazapine  7.5 mg Oral Nightly    pantoprazole  20 mg Oral BID AC    pregabalin  200 mg Oral BID    traZODone  150 mg Oral Nightly    venlafaxine  150 mg Oral Daily with breakfast    sodium chloride flush  5-40 mL IntraVENous 2 times per day    insulin lispro  0-12 Units SubCUTAneous TID WC    insulin lispro  0-6 Units SubCUTAneous Nightly       Social History:     Social History     Socioeconomic History    Marital status: Legally      Spouse name: Not on file    Number of children: Not on file    Years of education: Not on file    Highest education level: Not on file   Occupational History    Not on file   Tobacco Use    Smoking status: Never Smoker    Smokeless tobacco: Never Used   Vaping Use    Vaping Use: Never used   Substance and Sexual Activity    Alcohol use: Not Currently    Drug use: Yes     Types: Marijuana (Tori Gloss), Cocaine     Comment: + Cocaine 2/2021 also, see Care Everywhere UDS    Sexual activity: Not Currently     Partners: Male Other Topics Concern    Not on file   Social History Narrative    Not on file     Social Determinants of Health     Financial Resource Strain: High Risk    Difficulty of Paying Living Expenses: Hard   Food Insecurity: Food Insecurity Present    Worried About Running Out of Food in the Last Year: Often true    Kelin of Food in the Last Year: Often true   Transportation Needs: Unmet Transportation Needs    Lack of Transportation (Medical):  Yes    Lack of Transportation (Non-Medical): Yes   Physical Activity: Inactive    Days of Exercise per Week: 0 days    Minutes of Exercise per Session: 0 min   Stress: Stress Concern Present    Feeling of Stress : Rather much   Social Connections:     Frequency of Communication with Friends and Family: Not on file    Frequency of Social Gatherings with Friends and Family: Not on file    Attends Christianity Services: Not on file    Active Member of Clubs or Organizations: Not on file    Attends Club or Organization Meetings: Not on file    Marital Status: Not on file   Intimate Partner Violence:     Fear of Current or Ex-Partner: Not on file    Emotionally Abused: Not on file    Physically Abused: Not on file    Sexually Abused: Not on file   Housing Stability: High Risk    Unable to Pay for Housing in the Last Year: Yes    Number of [place] in the Last Year: 2    Unstable Housing in the Last Year: Yes       Family History:     Family History   Problem Relation Age of Onset    Diabetes Mother     Stroke Mother     Diabetes Father     Diabetes Sister     Diabetes Brother     Other Son         GSW    No Known Problems Sister       Medical Decision Making:   I have independently reviewed/ordered the following labs:    CBC with Differential:   Recent Labs     02/05/22 0645 02/06/22 0623   WBC 7.5 6.3   HGB 8.4* 7.1*   HCT 27.7* 22.6*   * 476*   LYMPHOPCT 44 41   MONOPCT 5 9*     BMP:  Recent Labs     02/05/22  0645 02/06/22  0623    138 K 4.8 4.6    105   CO2 23 23   BUN 19 25*   CREATININE 0.88 1.22*     Hepatic Function Panel:   No results for input(s): PROT, LABALBU, BILIDIR, IBILI, BILITOT, ALKPHOS, ALT, AST in the last 72 hours. No results for input(s): RPR in the last 72 hours. No results for input(s): HIV in the last 72 hours. No results for input(s): BC in the last 72 hours. Lab Results   Component Value Date    CREATININE 1.22 02/06/2022    GLUCOSE 142 02/06/2022       Detailed results:    Yeny Brantley MD  Internal Medicine Resident, PGY-1  Infectious Disease Service,  [address] [facility] ATTESTATION:    I have discussed the case, including pertinent history and exam findings with the residents and students. I have seen and examined the patient and the key elements of the encounter have been performed by me. I was present when the student obtained his information or examined the patient. I have reviewed the laboratory data, other diagnostic studies and discussed them with the residents. I have updated the medical record where necessary. I agree with the assessment, plan and orders as documented by the resident/ student. Lisa Martin MD.      Thank you for allowing us to participate in the care of this patient. Please call with questions. This note is created with the assistance of a speech recognition program.  While intending to generate adocument that actually reflects the content of the visit, the document can still have some errors including those of syntax and sound a like substitutions which may escape proof reading. It such instances, actual meaningcan be extrapolated by contextual diversion.

## 2022-02-07 LAB
ABSOLUTE EOS #: 0.06 K/UL (ref 0–0.4)
ABSOLUTE IMMATURE GRANULOCYTE: 0 K/UL (ref 0–0.3)
ABSOLUTE LYMPH #: 2.94 K/UL (ref 1–4.8)
ABSOLUTE MONO #: 0.72 K/UL (ref 0.1–0.8)
ANION GAP SERPL CALCULATED.3IONS-SCNC: 12 MMOL/L (ref 9–17)
BASOPHILS # BLD: 1 % (ref 0–2)
BASOPHILS ABSOLUTE: 0.06 K/UL (ref 0–0.2)
BUN BLDV-MCNC: 21 MG/DL (ref 6–20)
BUN/CREAT BLD: ABNORMAL (ref 9–20)
CALCIUM SERPL-MCNC: 8.1 MG/DL (ref 8.6–10.4)
CHLORIDE BLD-SCNC: 109 MMOL/L (ref 98–107)
CO2: 20 MMOL/L (ref 20–31)
CREAT SERPL-MCNC: 0.97 MG/DL (ref 0.5–0.9)
DIFFERENTIAL TYPE: ABNORMAL
EOSINOPHILS RELATIVE PERCENT: 1 % (ref 1–4)
GFR AFRICAN AMERICAN: >60 ML/MIN
GFR NON-AFRICAN AMERICAN: 60 ML/MIN
GFR SERPL CREATININE-BSD FRML MDRD: ABNORMAL ML/MIN/{1.73_M2}
GFR SERPL CREATININE-BSD FRML MDRD: ABNORMAL ML/MIN/{1.73_M2}
GLUCOSE BLD-MCNC: 110 MG/DL (ref 65–105)
GLUCOSE BLD-MCNC: 126 MG/DL (ref 65–105)
GLUCOSE BLD-MCNC: 162 MG/DL (ref 65–105)
GLUCOSE BLD-MCNC: 169 MG/DL (ref 65–105)
GLUCOSE BLD-MCNC: 195 MG/DL (ref 70–99)
GLUCOSE BLD-MCNC: 218 MG/DL (ref 65–105)
HCT VFR BLD CALC: 24.9 % (ref 36.3–47.1)
HEMOGLOBIN: 7.3 G/DL (ref 11.9–15.1)
IMMATURE GRANULOCYTES: 0 %
LYMPHOCYTES # BLD: 49 % (ref 24–44)
MCH RBC QN AUTO: 31.1 PG (ref 25.2–33.5)
MCHC RBC AUTO-ENTMCNC: 29.3 G/DL (ref 28.4–34.8)
MCV RBC AUTO: 106 FL (ref 82.6–102.9)
MONOCYTES # BLD: 12 % (ref 1–7)
MORPHOLOGY: ABNORMAL
MORPHOLOGY: ABNORMAL
NRBC AUTOMATED: 0 PER 100 WBC
PDW BLD-RTO: 14.7 % (ref 11.8–14.4)
PLATELET # BLD: 400 K/UL (ref 138–453)
PLATELET ESTIMATE: ABNORMAL
PMV BLD AUTO: 10 FL (ref 8.1–13.5)
POTASSIUM SERPL-SCNC: 4.6 MMOL/L (ref 3.7–5.3)
RBC # BLD: 2.35 M/UL (ref 3.95–5.11)
RBC # BLD: ABNORMAL 10*6/UL
SEG NEUTROPHILS: 37 % (ref 36–66)
SEGMENTED NEUTROPHILS ABSOLUTE COUNT: 2.22 K/UL (ref 1.8–7.7)
SODIUM BLD-SCNC: 141 MMOL/L (ref 135–144)
WBC # BLD: 6 K/UL (ref 3.5–11.3)
WBC # BLD: ABNORMAL 10*3/UL

## 2022-02-07 PROCEDURE — 1200000000 HC SEMI PRIVATE

## 2022-02-07 PROCEDURE — 6370000000 HC RX 637 (ALT 250 FOR IP): Performed by: PODIATRIST

## 2022-02-07 PROCEDURE — 2580000003 HC RX 258: Performed by: PODIATRIST

## 2022-02-07 PROCEDURE — 2580000003 HC RX 258: Performed by: INTERNAL MEDICINE

## 2022-02-07 PROCEDURE — 6360000002 HC RX W HCPCS

## 2022-02-07 PROCEDURE — 99233 SBSQ HOSP IP/OBS HIGH 50: CPT | Performed by: STUDENT IN AN ORGANIZED HEALTH CARE EDUCATION/TRAINING PROGRAM

## 2022-02-07 PROCEDURE — 6370000000 HC RX 637 (ALT 250 FOR IP)

## 2022-02-07 PROCEDURE — 85025 COMPLETE CBC W/AUTO DIFF WBC: CPT

## 2022-02-07 PROCEDURE — 80048 BASIC METABOLIC PNL TOTAL CA: CPT

## 2022-02-07 PROCEDURE — 6370000000 HC RX 637 (ALT 250 FOR IP): Performed by: STUDENT IN AN ORGANIZED HEALTH CARE EDUCATION/TRAINING PROGRAM

## 2022-02-07 PROCEDURE — 36415 COLL VENOUS BLD VENIPUNCTURE: CPT

## 2022-02-07 PROCEDURE — 6360000002 HC RX W HCPCS: Performed by: INTERNAL MEDICINE

## 2022-02-07 PROCEDURE — 82947 ASSAY GLUCOSE BLOOD QUANT: CPT

## 2022-02-07 PROCEDURE — 2580000003 HC RX 258

## 2022-02-07 PROCEDURE — 99232 SBSQ HOSP IP/OBS MODERATE 35: CPT | Performed by: INTERNAL MEDICINE

## 2022-02-07 RX ORDER — HYDROXYZINE HYDROCHLORIDE 25 MG/1
25 TABLET, FILM COATED ORAL 3 TIMES DAILY PRN
Status: DISCONTINUED | OUTPATIENT
Start: 2022-02-07 | End: 2022-02-08 | Stop reason: HOSPADM

## 2022-02-07 RX ORDER — DIPHENHYDRAMINE HYDROCHLORIDE 50 MG/ML
12.5 INJECTION INTRAMUSCULAR; INTRAVENOUS ONCE
Status: COMPLETED | OUTPATIENT
Start: 2022-02-07 | End: 2022-02-07

## 2022-02-07 RX ADMIN — ACETAMINOPHEN 650 MG: 325 TABLET ORAL at 00:22

## 2022-02-07 RX ADMIN — DICYCLOMINE HYDROCHLORIDE 10 MG: 10 CAPSULE ORAL at 12:28

## 2022-02-07 RX ADMIN — OXYCODONE 5 MG: 5 TABLET ORAL at 08:29

## 2022-02-07 RX ADMIN — HYDROXYZINE HYDROCHLORIDE 10 MG: 10 TABLET ORAL at 08:52

## 2022-02-07 RX ADMIN — PREGABALIN 200 MG: 100 CAPSULE ORAL at 08:30

## 2022-02-07 RX ADMIN — AMPICILLIN SODIUM AND SULBACTAM SODIUM 3000 MG: 2; 1 INJECTION, POWDER, FOR SOLUTION INTRAMUSCULAR; INTRAVENOUS at 12:28

## 2022-02-07 RX ADMIN — TRAZODONE HYDROCHLORIDE 150 MG: 100 TABLET ORAL at 21:40

## 2022-02-07 RX ADMIN — INSULIN LISPRO 2 UNITS: 100 INJECTION, SOLUTION INTRAVENOUS; SUBCUTANEOUS at 16:16

## 2022-02-07 RX ADMIN — SODIUM CHLORIDE, PRESERVATIVE FREE 10 ML: 5 INJECTION INTRAVENOUS at 21:40

## 2022-02-07 RX ADMIN — AMPICILLIN SODIUM AND SULBACTAM SODIUM 3000 MG: 2; 1 INJECTION, POWDER, FOR SOLUTION INTRAMUSCULAR; INTRAVENOUS at 06:00

## 2022-02-07 RX ADMIN — INSULIN LISPRO 2 UNITS: 100 INJECTION, SOLUTION INTRAVENOUS; SUBCUTANEOUS at 21:52

## 2022-02-07 RX ADMIN — SIMETHICONE 80 MG: 80 TABLET, CHEWABLE ORAL at 08:30

## 2022-02-07 RX ADMIN — DICYCLOMINE HYDROCHLORIDE 10 MG: 10 CAPSULE ORAL at 16:16

## 2022-02-07 RX ADMIN — OXYCODONE 5 MG: 5 TABLET ORAL at 15:23

## 2022-02-07 RX ADMIN — PANTOPRAZOLE SODIUM 20 MG: 40 TABLET, DELAYED RELEASE ORAL at 16:16

## 2022-02-07 RX ADMIN — AMPICILLIN SODIUM AND SULBACTAM SODIUM 3000 MG: 2; 1 INJECTION, POWDER, FOR SOLUTION INTRAMUSCULAR; INTRAVENOUS at 18:19

## 2022-02-07 RX ADMIN — MIRTAZAPINE 7.5 MG: 15 TABLET, FILM COATED ORAL at 21:40

## 2022-02-07 RX ADMIN — INSULIN LISPRO 2 UNITS: 100 INJECTION, SOLUTION INTRAVENOUS; SUBCUTANEOUS at 08:17

## 2022-02-07 RX ADMIN — ALBUTEROL SULFATE 2 PUFF: 90 AEROSOL, METERED RESPIRATORY (INHALATION) at 16:16

## 2022-02-07 RX ADMIN — OXYCODONE 5 MG: 5 TABLET ORAL at 21:40

## 2022-02-07 RX ADMIN — DICYCLOMINE HYDROCHLORIDE 10 MG: 10 CAPSULE ORAL at 08:30

## 2022-02-07 RX ADMIN — INSULIN GLARGINE 25 UNITS: 100 INJECTION, SOLUTION SUBCUTANEOUS at 08:29

## 2022-02-07 RX ADMIN — SODIUM CHLORIDE: 9 INJECTION, SOLUTION INTRAVENOUS at 15:23

## 2022-02-07 RX ADMIN — POLYETHYLENE GLYCOL 3350 17 G: 17 POWDER, FOR SOLUTION ORAL at 08:29

## 2022-02-07 RX ADMIN — DIPHENHYDRAMINE HYDROCHLORIDE 12.5 MG: 50 INJECTION, SOLUTION INTRAMUSCULAR; INTRAVENOUS at 21:40

## 2022-02-07 RX ADMIN — VENLAFAXINE HYDROCHLORIDE 150 MG: 150 CAPSULE, EXTENDED RELEASE ORAL at 08:30

## 2022-02-07 RX ADMIN — AMPICILLIN SODIUM AND SULBACTAM SODIUM 3000 MG: 2; 1 INJECTION, POWDER, FOR SOLUTION INTRAMUSCULAR; INTRAVENOUS at 00:23

## 2022-02-07 RX ADMIN — INSULIN GLARGINE 25 UNITS: 100 INJECTION, SOLUTION SUBCUTANEOUS at 21:51

## 2022-02-07 RX ADMIN — HYDROXYZINE HYDROCHLORIDE 10 MG: 10 TABLET ORAL at 15:30

## 2022-02-07 RX ADMIN — PANTOPRAZOLE SODIUM 20 MG: 40 TABLET, DELAYED RELEASE ORAL at 08:30

## 2022-02-07 RX ADMIN — DICYCLOMINE HYDROCHLORIDE 10 MG: 10 CAPSULE ORAL at 21:39

## 2022-02-07 RX ADMIN — PREGABALIN 200 MG: 100 CAPSULE ORAL at 21:40

## 2022-02-07 RX ADMIN — DOCUSATE SODIUM 50 MG AND SENNOSIDES 8.6 MG 2 TABLET: 8.6; 5 TABLET, FILM COATED ORAL at 08:29

## 2022-02-07 RX ADMIN — OXYCODONE 5 MG: 5 TABLET ORAL at 00:23

## 2022-02-07 ASSESSMENT — PAIN SCALES - GENERAL
PAINLEVEL_OUTOF10: 4
PAINLEVEL_OUTOF10: 9
PAINLEVEL_OUTOF10: 8
PAINLEVEL_OUTOF10: 10
PAINLEVEL_OUTOF10: 9
PAINLEVEL_OUTOF10: 10
PAINLEVEL_OUTOF10: 5

## 2022-02-07 ASSESSMENT — ENCOUNTER SYMPTOMS
EYE DISCHARGE: 0
COLOR CHANGE: 1
CHOKING: 0
ABDOMINAL DISTENTION: 0
SHORTNESS OF BREATH: 0
EYE ITCHING: 0
COUGH: 0
EYE PAIN: 0
PHOTOPHOBIA: 0
EYE REDNESS: 0
CHEST TIGHTNESS: 0
APNEA: 0

## 2022-02-07 ASSESSMENT — PAIN DESCRIPTION - ONSET: ONSET: ON-GOING

## 2022-02-07 ASSESSMENT — PAIN DESCRIPTION - PAIN TYPE: TYPE: ACUTE PAIN;SURGICAL PAIN

## 2022-02-07 ASSESSMENT — PAIN DESCRIPTION - PROGRESSION: CLINICAL_PROGRESSION: NOT CHANGED

## 2022-02-07 ASSESSMENT — PAIN DESCRIPTION - FREQUENCY: FREQUENCY: CONTINUOUS

## 2022-02-07 ASSESSMENT — PAIN DESCRIPTION - LOCATION: LOCATION: FOOT

## 2022-02-07 ASSESSMENT — PAIN DESCRIPTION - DESCRIPTORS: DESCRIPTORS: ACHING;DISCOMFORT

## 2022-02-07 ASSESSMENT — PAIN DESCRIPTION - ORIENTATION: ORIENTATION: RIGHT

## 2022-02-07 NOTE — PROGRESS NOTES
[facility] -   Infectious diseases evaluation  admission date 1/22/2022    reason for consultation:   Diabetic foot infection    Impression :   Current:  · Right diabetic foot infection, GAS  · Cellulitis both feet  · bandemia  · Bacteriuria vs UTI kleb S ancef  · CRP elevation    Other:  ·   Discussion / summary of stay / plan of care   ·   Recommendations   · foot cx MSSA R clinda  and GAS- finegoldia and aerococcus  · On  Ancef-2/1 switch to Unasyn   · MRI suggestive of a midfoot collection - podiatry feel it is a phlegmon  - tenderness better 2/3  · FU office in 2 weeks - DC on invanz 1 g daily x 2 weeks w office FU - might need po Augmentin afterwards  · Recent UC 1/24 : Klebsiella- no dysuria - but lower abd pain - on keflex till 1/29 completed    Infection Control Recommendations   · Lawrence Precautions  · Contact Isolation       Antimicrobial Stewardship Recommendations   · Simplification of therapy  · Targeted therapy  Coordination ofOutpatient Care:   · Estimated Length of IV antimicrobials:  · Patient will need Midline / picc Catheter Insertion:   · Patient will need SNF:  · Patient will need outpatient wound care:     History of Present Illness:   Initial history:  Kavita Chavez is a 49y.o.-year-old female with diabetes very well-known to my service, stepped on a piece of glass a few days prior to admission presented with swelling and drainage from right foot, culture came back with group A strep,  Patient has neuropathy from diabetes  WBC elevated 14,  sed [id number],     Podiatry debrided the superficial ulcer and felt it was not too deep yet there was cellulitis of both feet.   Hemoglobin A1c was 10    MRI of the left foot no osteomyelitis  MRI of the right foot no osteomyelitis    Leukocytosis responded to antibiotics, infectious consulted for antibiotic management  Patient is on cefepime and clindamycin    R foot very swollen and tender to light touch - suspect deeper involvement and needs more I/D    1/26  Pt is afebrile and stable this morning. WBC trending up (14 today). UC 1/24 growing Klebsiella. CT right leg 1/25 shows an abscess  Planned for I/D on 1/27  Pain still ++    1/27  Pt seen and examined at bedside, afebrile and hypotensive this morning. Endorsed a lot of pain overnight. WBC improved to 11   To go to OR today for I+D of R foot abscess. Started on Keflex until 1/29 for UTI  Will monitor on antibiotics and await podiatry recs    1/28  Pt afebrile and stable s/p I+D right foot yesterday. Pt c/o pain this morning but is managing. Pt also endorsing abdominal discomfort. XR Abd showing increased stool burden. WBC did increase to 15 from 11 yesterday. Wound Cx 1/27: pending but GPC in pairs seen. Will monitor on Keflex and Clinda    1/29  No fever  Lab: MSSA in wound , but clinda is R  Switching to ceftriaxone  Foot better - less edema and drainage -tender still    1/30  Right foot  but discharge is minimal from it. Otherwise she has no abdominal pain or diarrhea CRP is improved 54    1/31  Alert appropriate, right foot continues to be swollen very tender to touch and it is painful. The wound is not draining much though. No redness noticed. Podiatry watching at this point. Continue antibiotic  WBC is 10 and CRP continues to be 53 has not improved in today    2/1  Right foot remains tender to light touch over the sole and over the first metatarsal  Edema slightly better over the ankle and the dorsum but continues to be present over the shoulder without any drainage  Discussed with Dr. Hernandez. , he will wait another few days and allow the antibiotics to work before deciding further procedure  Cultures are now growing Aerococcus and Mechelle via, on top of the MSSA and the group A strep    2/2  Foot remains very tender around the hallux and the small area, same areas.   Not improved  MRI suggestive of a collection that could be an abscess    2/3  Foot tenderness improved   Per podiatry this is a phlegmon on MRI and not an ABSCESS  Pt agreed to rehab - x few weeks  Will DC on invanz 2-3 weeks and FU office and anticipate po AB at some point -  Also FU w podiatry office to FU on progress of the foot    2/4  Pt is feeling much better, denies any other active complaints apart from the pain and discomfort on the right foot. No purulent discharge visible. Tenderness improved  Ongoing discharge planning to SNF. Precert restarted. Awaiting placement. 2/5   Pain is under control with the current regimen. Its mildly tender. Minimal serous discharge present  No significant change in the clinical status of the patient. Pt working with PT. Stable for discharge, ongoing precert.      2/6  Afebrile, vitals stable. Has daily dressing change. No surgical intervention planned by Podiatry. She had mild MARTIN, encouraged to drink adequate amount of fluids  She denies any urinary complaints. 2/7   Afebrile. Hemodynamically stable. Had hypoglycemia episode overnight, resolved. Overall doing much better. Has persistent pain in the right foot. Tenderness and erythema improving. She is tolerating oral intake well, has constipation, bowel regimen was given.      Interval changes  2/7/2022     /77   Pulse 83   Temp 98.2 °F (36.8 °C) (Oral)   Resp 16   Ht 5' 6\" (1.676 m)   Wt 251 lb 9.6 oz (114.1 kg)   LMP  (LMP Unknown)   SpO2 95%   BMI 40.61 kg/m²      Summary of relevant labs:  Labs:  W14 > 11>15-10->7.5->6.3->6  [id number]  -54-53  Creat 0.97    Micro:  cx foot pus GAS, (anaerococcus prevotii, finegoldia jessica light growth, which were not isolated at 5 days.)   Uc 1/24: Klebsiella  Wound Cx 1/27: GPC pairs/GAS     Imaging:  MRI of the right foot 2/2  Soft tissue defect along the plantar and medial surface of the midfoot with   suspected irregular fluid collection measuring 4.4 x 1.2 x 1.8 cm, which may   represent an abscess or phlegmon. XR abd 1/27: Increased stool burden seen diffusely throughout the colon suggesting   clinical presentation of constipation.  Phleboliths seen on both sides of the   pelvis. CT R foot 1/25:  1. Shallow soft tissue ulceration plantar and medial to the 1st metatarsal   base with an underlying area of possible abscess versus phlegmon measuring   2.8 x 1.1 x 0.9 cm.   2. Extensive subcutaneous fat stranding compatible with cellulitis.  No soft   tissue gas. 3. No evidence of osteomyelitis or other acute osseous abnormality. U/S renal 1/25: Unremarkable    MRI R foot 1/23:  Subcutaneous edema and swelling, most notably affecting the dorsum of the   foot, which may reflect underlying cellulitis.  No discrete organized fluid   collection is seen within the limitations of a noncontrast study.       No imaging features of acute osteomyelitis appreciated. MRI L foot: 1/23:   Limited evaluation due to patient motion artifact on several sequences.       Skin thickening and subcutaneous edema seen within the plantar soft tissues   of the foot at the level of the proximal metatarsals, which may reflect   underlying cellulitis.  Within the limitations of a noncontrast study, no   discrete organized fluid collection is seen.       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 I have personally reviewed the past medical history, past surgical history, medications, social history, and family history, and I haveupdated the database accordingly. Allergies:   Bactrim [sulfamethoxazole-trimethoprim] and Adhesive tape     Review of Systems:     Review of Systems   Constitutional: Negative for activity change, appetite change, chills, diaphoresis and fatigue. HENT: Negative for congestion. Eyes: Negative for photophobia, pain, discharge, redness and itching.    Respiratory: Negative for apnea, cough, choking, chest tightness and shortness of breath. Cardiovascular: Negative for chest pain. Gastrointestinal: Negative for abdominal distention. Endocrine: Negative for cold intolerance, polydipsia and polyphagia. Genitourinary: Negative for dysuria and flank pain. Musculoskeletal: Negative for arthralgias. Skin: Positive for color change and wound. Allergic/Immunologic: Negative for environmental allergies, food allergies and immunocompromised state. Neurological: Negative for dizziness, tremors, seizures, syncope, light-headedness, numbness and headaches. Hematological: Negative for adenopathy. Psychiatric/Behavioral: Negative for agitation. Physical Examination :       Physical Exam  Constitutional:       General: She is not in acute distress. Appearance: Normal appearance. She is not ill-appearing, toxic-appearing or diaphoretic. HENT:      Head: Normocephalic and atraumatic. Nose: Nose normal. No congestion or rhinorrhea. Eyes:      General: No scleral icterus. Pupils: Pupils are equal, round, and reactive to light. Cardiovascular:      Rate and Rhythm: Normal rate and regular rhythm. Heart sounds: Normal heart sounds. No murmur heard. No friction rub. No gallop. Pulmonary:      Effort: No respiratory distress. Breath sounds: Normal breath sounds. No wheezing. Abdominal:      General: There is no distension. Palpations: Abdomen is soft. There is no mass. Tenderness: There is no abdominal tenderness. Hernia: No hernia is present. Genitourinary:     Comments: Urine fatuma  Musculoskeletal:         General: Swelling present. No tenderness, deformity or signs of injury. Cervical back: Neck supple. No rigidity or tenderness. Skin:     General: Skin is dry. Coloration: Skin is not jaundiced or pale. Findings: Erythema present. No bruising, lesion or rash.    Neurological:      Mental Status: She is alert and oriented to person, place, and time. Mental status is at baseline. Cranial Nerves: No cranial nerve deficit. Motor: No weakness. Psychiatric:         Mood and Affect: Mood normal.         Thought Content:  Thought content normal.         Past Medical History:     Past Medical History:   Diagnosis Date    Acid reflux 5/29/2017    Acute cystitis with hematuria 10/11/2016    Acute non-recurrent maxillary sinusitis 11/28/2017    Asthma     Bipolar 1 disorder ([address] 1/4/2018    Bipolar disorder, mixed ([address]     BMI 34.0-34.9,adult 11/28/2017    Cannabis use disorder, severe, dependence ([address] 12/19/2017    Cerebrovascular accident (CVA) ([address] 6/14/2017    Chest pain 11/5/2016    Chronic renal insufficiency     Cocaine abuse ([address] 1/5/2018    COVID-19 virus RNA test result unknown     DDD (degenerative disc disease), cervical     Diabetes mellitus ([address]     Dizziness 6/13/2017    Fibromyalgia     History of stroke 9/9/2017    Homicidal ideation 11/6/2017    Hyperglycemia     Hypertension     Hypotension 1/18/2019    IDDM (insulin dependent diabetes mellitus) 12/21/2015    Lupus ([address]     Migraine     Neuropathy     Neuropathy     Polysubstance abuse ([address] 9/17/2017    Post traumatic stress disorder (PTSD)     Posttraumatic stress disorder 5/29/2017    Recurrent depression ([address] 5/29/2017    Recurrent major depressive disorder, in partial remission ([address] 11/28/2017    Screening mammogram, encounter for 11/28/2017    Severe recurrent major depression with psychotic features ([address] 12/19/2017    Severe recurrent major depression without psychotic features ([address] 12/19/2017    Stroke (cerebrum) ([address] 6/14/2017    Stroke ([address]     per chart notes    Suicidal ideation     Suicidal intent 3/10/2017    Vitamin D deficiency 11/28/2017    White matter changes 6/13/2017       Past Surgical  History:     Past Surgical History:   Procedure Laterality Date    ABDOMEN SURGERY      drain tube    ABSCESS DRAINAGE right buttock    CATARACT REMOVAL WITH IMPLANT Bilateral     CHEST TUBE INSERTION      FINGER AMPUTATION Left 03/13/2021    LEFT RING FINER AMPUTATION performed by Mirela O[facility]co MD at [facility] Right 10/11/2021    AMPUTATION RIGHT INDEX FINGER performed by Mirela Orozco MD at [address] [place] [address] Right 1/27/2022    FOOT ABSCESS INCISION AND DRAINAGE  (PULSE LAVAGE, CULTURE SWABS) performed by Quentin Chery DPM at [place] [id number]. Right 01/27/2022    FOOT ABSCESS INCISION AND DRAINAGE (PULSE LAVAGE, CULTURE SWABS) - Right    HAND SURGERY Right 09/14/2021    I&D INDEX FINGER performed by Mirela Orozco MD at [address] [place] [address] Right 09/15/2021    I&D INDEX FINGER performed by Mirela Orozco MD at [place] [address]  2/3/2022         LASIK Bilateral        Medications:      insulin glargine  25 Units SubCUTAneous BID    ampicillin-sulbactam  3,000 mg IntraVENous Q6H    enoxaparin  30 mg SubCUTAneous BID    budesonide-formoterol  2 puff Inhalation BID    dicyclomine  10 mg Oral 4x Daily    fluticasone  1 spray Each Nostril Daily    mirtazapine  7.5 mg Oral Nightly    pantoprazole  20 mg Oral BID AC    pregabalin  200 mg Oral BID    traZODone  150 mg Oral Nightly    venlafaxine  150 mg Oral Daily with breakfast    sodium chloride flush  5-40 mL IntraVENous 2 times per day    insulin lispro  0-12 Units SubCUTAneous TID WC    insulin lispro  0-6 Units SubCUTAneous Nightly       Social History:     Social History     Socioeconomic History    Marital status: Legally      Spouse name: Not on file    Number of children: Not on file    Years of education: Not on file    Highest education level: Not on file   Occupational History    Not on file   Tobacco Use    Smoking status: Never Smoker    Smokeless tobacco: Never Used   Vaping Use    Vaping Use: Never used   Substance and Sexual Activity    Alcohol use: Not Currently    Drug use: Yes     Types: Marijuana Fei Manuel), Cocaine     Comment: + Cocaine 2/2021 also, see Care Everywhere UDS    Sexual activity: Not Currently     Partners: Male   Other Topics Concern    Not on file   Social History Narrative    Not on file     Social Determinants of Health     Financial Resource Strain: High Risk    Difficulty of Paying Living Expenses: Hard   Food Insecurity: Food Insecurity Present    Worried About Running Out of Food in the Last Year: Often true    Kelin of Food in the Last Year: Often true   Transportation Needs: Unmet Transportation Needs    Lack of Transportation (Medical):  Yes    Lack of Transportation (Non-Medical): Yes   Physical Activity: Inactive    Days of Exercise per Week: 0 days    Minutes of Exercise per Session: 0 min   Stress: Stress Concern Present    Feeling of Stress : Rather much   Social Connections:     Frequency of Communication with Friends and Family: Not on file    Frequency of Social Gatherings with Friends and Family: Not on file    Attends [employer] Services: Not on file    Active Member of Clubs or Organizations: Not on file    Attends Club or Organization Meetings: Not on file    Marital Status: Not on file   Intimate Partner Violence:     Fear of Current or Ex-Partner: Not on file    Emotionally Abused: Not on file    Physically Abused: Not on file    Sexually Abused: Not on file   Housing Stability: High Risk    Unable to Pay for Housing in the Last Year: Yes    Number of [place] in the Last Year: 2    Unstable Housing in the Last Year: Yes       Family History:     Family History   Problem Relation Age of Onset    Diabetes Mother     Stroke Mother     Diabetes Father     Diabetes Sister     Diabetes Brother     Other Son         GSW    No Known Problems Sister       Medical Decision Making:   I have independently reviewed/ordered the following labs:    CBC with Differential:   Recent Labs 02/06/22  0623 02/07/22  0559   WBC 6.3 6.0   HGB 7.1* 7.3*   HCT 22.6* 24.9*   * 400   LYMPHOPCT 41 49*   MONOPCT 9* 12*     BMP:  Recent Labs     02/06/22  0623 02/07/22  0559    141   K 4.6 4.6    109*   CO2 23 20   BUN 25* 21*   CREATININE 1.22* 0.97*     Hepatic Function Panel:   No results for input(s): PROT, LABALBU, BILIDIR, IBILI, BILITOT, ALKPHOS, ALT, AST in the last 72 hours. No results for input(s): RPR in the last 72 hours. No results for input(s): HIV in the last 72 hours. No results for input(s): BC in the last 72 hours. Lab Results   Component Value Date    CREATININE 0.97 02/07/2022    GLUCOSE 195 02/07/2022       Detailed results:    Sanam Moreno MD  Internal Medicine Resident, PGY-1  Infectious Disease Service,  [facility]. I have discussed the care of the patient, including pertinent history and exam findings,  with the resident. I have seen and examined the patient and the key elements of all parts of the encounter have been performed by me. I agree with the assessment, plan and orders as documented by the resident.     Maylin Puga, Infectious Diseases

## 2022-02-07 NOTE — FLOWSHEET NOTE
SPIRITUAL CARE DEPARTMENT - [facility] [address]  PROGRESS NOTE    Shift date: 2/7/22  Shift day: Monday   Shift # 2    Room # 0008/9753-64   Name: Joe Plaza            Age: 49 y.o. Gender: female          Rastafarian: Restorationist   Place of Religion: Unknown    Referral: Routine Visit    Admit Date & Time: 1/22/2022  4:43 PM    PATIENT/EVENT DESCRIPTION:  Joe Plaza is a 49 y.o. female in room 12 of . SPIRITUAL ASSESSMENT/INTERVENTION:   was called to visit with patient. Patient was under emotional distress and just want a listening ear to hear the pain the patient has suffered.  writing note provided prayers and spiritual encouragement as needed. SPIRITUAL CARE FOLLOW-UP PLAN:  Chaplains will remain available to offer spiritual and emotional support as needed. Electronically signed by Valentina Kaur, on 2/7/2022 at 6:25 PM.  [facility]  [phone]       02/07/22 1823   Encounter Summary   Services provided to: Patient   Referral/Consult From: Nurse   Support System Children;Family members   Continue Visiting   (2/7/22)   Complexity of Encounter Moderate   Length of Encounter 1 hour;15 minutes   Spiritual Assessment Completed Yes   Routine   Type Follow up   Assessment Tearful;Grieving; Anxious; Loneliness; Fearful; Hopeful;Spiritual struggle;Coping;Helplessness; Sad   Intervention Active listening;Explored feelings, thoughts, concerns;Explored coping resources;Nurtured hope;Prayer;Scripture;Sustaining presence/ Ministry of presence   Outcome Acceptance;Comfort;Expressed gratitude;Engaged in conversation; Shared life review;Coping;Tearful;Less anxious, less agitated;Grieving;Encouraged; Hopeful;Receptive;Venting emotion;Expressed regrets

## 2022-02-07 NOTE — PROGRESS NOTES
[address] [facility]  Progress Note    Date:   2/7/2022  Patient name:  Silver Springer  Date of admission:  1/22/2022  4:43 PM  MRN:   [id number]  YOB: 1965    SUBJECTIVE/Last 24 hours update:     Patient seen and examined at the bed side , patient had a few episodes of hypoglycemia yesterday, lantus was decreased back to patients home dose of 25U BID. Patient is doing well, resting comfortably at bedside but states she is 10/10 pain. Roxicodone is 5mg q6h. Patient is on day 11 of pain medication and will not be discharged home with any additional pain meds. Also has some issues with constipation, despite receiving Senna and Miralax which has provided some relief. Will discharge patient with stool softeners Currently pending placement. Notes from nursing staff and Consults had been reviewed, and the overnight progress had been checked with the nursing staff as well.     REVIEW OF SYSTEMS:      CONSTITUTIONAL:  no fevers or chills  EYES: negative for blury vision  HEENT: No headaches  RESPIRATORY:negative for dyspnea, no wheezing, no Cough  CARDIOVASCULAR: negative for chest pain, no palpitations  GASTROINTESTINAL: no nausea, no vomiting, no change in bowel habits, no abdominal pain   GENITOURINARY: negative for dysuria, no hematuria   MUSCULOSKELETAL: pain in right foot   NEUROLOGICAL: No  Weakness or numbness    PAST MEDICAL HISTORY:      has a past medical history of Acid reflux, Acute cystitis with hematuria, Acute non-recurrent maxillary sinusitis, Asthma, Bipolar 1 disorder ([address] Bipolar disorder, mixed ([address] BMI 34.0-34.9,adult, Cannabis use disorder, severe, dependence ([address] Cerebrovascular accident (CVA) ([address] Chest pain, Chronic renal insufficiency, Cocaine abuse ([address] COVID-19 virus RNA test result unknown, DDD (degenerative disc disease), cervical, Diabetes mellitus ([address] Dizziness, Fibromyalgia, History of stroke, Homicidal ideation, Hyperglycemia, Hypertension, Hypotension, IDDM (insulin dependent diabetes mellitus), Lupus ([address] Migraine, Neuropathy, Neuropathy, Polysubstance abuse ([address] Post traumatic stress disorder (PTSD), Posttraumatic stress disorder, Recurrent depression ([address] Recurrent major depressive disorder, in partial remission ([address] Screening mammogram, encounter for, Severe recurrent major depression with psychotic features ([address] Severe recurrent major depression without psychotic features ([address] Stroke (cerebrum) ([address] Stroke ([address] Suicidal ideation, Suicidal intent, Vitamin D deficiency, and White matter changes. PAST SURGICAL HISTORY:      has a past surgical history that includes Abscess Drainage; chest tube insertion; Abdomen surgery; Cataract removal with implant (Bilateral); LASIK (Bilateral); Finger amputation (Left, 03/13/2021); Hand surgery (Right, 09/14/2021); Hand surgery (Right, 09/15/2021); Finger amputation (Right, 10/11/2021); Foot surgery (Right, 01/27/2022); Foot Debridement (Right, 1/27/2022); and Insert Midline Catheter (2/3/2022). SOCIAL HISTORY:      reports that she has never smoked. She has never used smokeless tobacco. She reports previous alcohol use. She reports current drug use. Drugs: Marijuana (Weed) and Cocaine. FAMILY HISTORY:     family history includes Diabetes in her brother, father, mother, and sister; No Known Problems in her sister; Other in her son; Stroke in her mother. HOME MEDICATIONS:      Prior to Admission medications    Medication Sig Start Date End Date Taking? Authorizing Provider   ertapenem [facility]) infusion Infuse 1,000 mg intravenously every 24 hours for 14 days Compound per protocol. 1/30/22 2/13/22 Yes Maylin Oconnor MD   pregabalin (LYRICA) 200 MG capsule Take 1 capsule by mouth 2 times daily for 30 days.  1/20/22 2/19/22  Aly Faust MD   insulin glargine (LANTUS SOLOSTAR) 100 UNIT/ML injection pen Inject 30 Units into the skin 2 times daily 1/20/22   Braden Tong MD   fluticasone (FLONASE) 50 MCG/ACT nasal spray 1 spray by Each Nostril route daily 12/22/21   Wendy Tyler MD   acetaminophen (TYLENOL) 500 MG tablet Take 2 tablets by mouth 3 times daily as needed for Pain 12/22/21   Wendy Tyler MD   dicyclomine (BENTYL) 10 MG capsule Take 1 capsule by mouth 4 times daily 12/17/21   Wendy Tyler MD   metFORMIN (GLUCOPHAGE) 1000 MG tablet Take 1 tablet by mouth 2 times daily (with meals) 12/17/21   Wendy Tyler MD   mirtazapine (REMERON) 7.5 MG tablet Take 1 tablet by mouth nightly 12/17/21   Wendy Tyler MD   pantoprazole (PROTONIX) 20 MG tablet Take 1 tablet by mouth 2 times daily (before meals) 12/17/21   Wendy Tyler MD   traZODone (DESYREL) 150 MG tablet Take 1 tablet by mouth nightly 12/17/21   Wendy Tyler MD   venlafaxine (EFFEXOR XR) 150 MG extended release capsule Take 1 capsule by mouth daily (with breakfast) 12/17/21   Wendy Tyler MD   ibuprofen (ADVIL;MOTRIN) 800 MG tablet Take 1 tablet by mouth every 8 hours as needed for Pain 11/15/21 11/29/21  Marlana Pallas, MD   Lancets MISC 1 each by Does not apply route 3 times daily 11/15/21   Marlana Pallas, MD   Alcohol Swabs 70 % PADS Use 1 swab 3 times daily as needed 11/15/21   Marlana Pallas, MD   blood glucose monitor strips Test 3 times a day & as needed for symptoms of irregular blood glucose. Dispense sufficient amount for indicated testing frequency plus additional to accommodate PRN testing needs.  11/8/21   Madiha Huerta MD   Lancets MISC 1 each by Does not apply route 3 times daily 11/8/21   Madiha Huerta MD   Insulin Pen Needle ([employer] PEN NEEDLES 31G) 31G X 8 MM MISC 1 each by Does not apply route daily 11/8/21   Murray Chandra MD   FREESTYLE LITE strip 1 each by Does not apply route 3 times daily 11/11/20   Wendy Tyler MD   albuterol sulfate  (90 Base) MCG/ACT inhaler Inhale 2 puffs into the lungs every 4 hours as needed for Wheezing 10/20/20 9/22/21  Bayron Marie MD   FLOVENT  MCG/ACT inhaler Inhale 2 puffs into the lungs 2 times daily 10/20/20   Bayron Marie MD   budesonide-formoterol (SYMBICORT) 80-4.5 MCG/ACT AERO Inhale 2 puffs into the lungs 2 times daily 10/20/20   Bayron Marie MD       ALLERGIES:     Bactrim [sulfamethoxazole-trimethoprim] and Adhesive tape      OBJECTIVE:       Vitals:    02/05/22 1935 02/06/22 0759 02/06/22 2020 02/07/22 0723   BP: (!) 140/89 (!) 144/113 (!) 144/81 119/77   Pulse: 89 88 90 83   Resp: 18 17 18 16   Temp: 98.8 °F (37.1 °C) 97.2 °F (36.2 °C) 97.7 °F (36.5 °C) 98.2 °F (36.8 °C)   TempSrc: Oral Oral Oral Oral   SpO2: 99% 92% 97% 95%   Weight:       Height:             Intake/Output Summary (Last 24 hours) at 2/7/2022 0902  Last data filed at 2/6/2022 1751  Gross per 24 hour   Intake 1668 ml   Output --   Net 1668 ml       PHYSICAL EXAM:  General Appearance  Alert , awake , not in acute distress  HEENT - Head is normocephalic, atraumatic. Lungs - Bilateral equal air entry, no wheezes, rales or rhonchi, aeration good  Cardiovascular - Heart sounds are normal.  Regular rhythm, normal rate without murmur, gallop or rub.   Abdomen - Soft, nontender, nondistended, no masses or organomegaly  Neurologic - There are no new focal motor or sensory deficits  Skin - No bruising or bleeding on exposed skin area  Extremities - No cyanosis, clubbing or edema, right foot bandaged     DIAGNOSTICS:     Laboratory Testing:    Recent Results (from the past 24 hour(s))   POC Glucose Fingerstick    Collection Time: 02/06/22 11:28 AM   Result Value Ref Range    POC Glucose 103 65 - 105 mg/dL   POC Glucose Fingerstick    Collection Time: 02/06/22  3:36 PM   Result Value Ref Range    POC Glucose 63 (L) 65 - 105 mg/dL   POC Glucose Fingerstick    Collection Time: 02/06/22  4:22 PM   Result Value Ref Range    POC Glucose 77 65 - 105 mg/dL   POC Glucose Fingerstick    Collection Time: 02/06/22  5:09 PM Result Value Ref Range    POC Glucose 120 (H) 65 - 105 mg/dL   POC Glucose Fingerstick    Collection Time: 02/06/22  8:16 PM   Result Value Ref Range    POC Glucose 181 (H) 65 - 105 mg/dL   Basic Metabolic Panel w/ Reflex to MG    Collection Time: 02/07/22  5:59 AM   Result Value Ref Range    Glucose 195 (H) 70 - 99 mg/dL    BUN 21 (H) 6 - 20 mg/dL    CREATININE 0.97 (H) 0.50 - 0.90 mg/dL    Bun/Cre Ratio NOT REPORTED 9 - 20    Calcium 8.1 (L) 8.6 - 10.4 mg/dL    Sodium 141 135 - 144 mmol/L    Potassium 4.6 3.7 - 5.3 mmol/L    Chloride 109 (H) 98 - 107 mmol/L    CO2 20 20 - 31 mmol/L    Anion Gap 12 9 - 17 mmol/L    GFR Non-African American 60 (L) >60 mL/min    GFR African American >60 >60 mL/min    GFR Comment          GFR Staging NOT REPORTED    CBC auto differential    Collection Time: 02/07/22  5:59 AM   Result Value Ref Range    WBC 6.0 3.5 - 11.3 k/uL    RBC 2.35 (L) 3.95 - 5.11 m/uL    Hemoglobin 7.3 (L) 11.9 - 15.1 g/dL    Hematocrit 24.9 (L) 36.3 - 47.1 %    .0 (H) 82.6 - 102.9 fL    MCH 31.1 25.2 - 33.5 pg    MCHC 29.3 28.4 - 34.8 g/dL    RDW 14.7 (H) 11.8 - 14.4 %    Platelets 565 060 - 203 k/uL    MPV 10.0 8.1 - 13.5 fL    NRBC Automated 0.0 0.0 per 100 WBC    Differential Type NOT REPORTED     WBC Morphology NOT REPORTED     RBC Morphology NOT REPORTED     Platelet Estimate NOT REPORTED     Immature Granulocytes 0 0 %    Seg Neutrophils 37 36 - 66 %    Lymphocytes 49 (H) 24 - 44 %    Monocytes 12 (H) 1 - 7 %    Eosinophils % 1 1 - 4 %    Basophils 1 0 - 2 %    Absolute Immature Granulocyte 0.00 0.00 - 0.30 k/uL    Segs Absolute 2.22 1.8 - 7.7 k/uL    Absolute Lymph # 2.94 1.0 - 4.8 k/uL    Absolute Mono # 0.72 0.1 - 0.8 k/uL    Absolute Eos # 0.06 0.0 - 0.4 k/uL    Basophils Absolute 0.06 0.0 - 0.2 k/uL    Morphology ANISOCYTOSIS PRESENT     Morphology MACROCYTOSIS PRESENT    POC Glucose Fingerstick    Collection Time: 02/07/22  5:59 AM   Result Value Ref Range    POC Glucose 169 (H) 65 - 105 mg/dL       Current Facility-Administered Medications   Medication Dose Route Frequency Provider Last Rate Last Admin    oxyCODONE (ROXICODONE) immediate release tablet 5 mg  5 mg Oral Q6H PRN Christiana Machuca MD   5 mg at 02/07/22 0829    0.9 % sodium chloride infusion   IntraVENous Continuous Christiana Machuca  mL/hr at 02/06/22 0906 New Bag at 02/06/22 0906    insulin glargine (LANTUS) injection vial 25 Units  25 Units SubCUTAneous BID Christiana Machuca MD   25 Units at 02/07/22 0829    sennosides-docusate sodium (SENOKOT-S) 8.6-50 MG tablet 2 tablet  2 tablet Oral Daily PRN Karlie Molina MD   2 tablet at 02/07/22 0829    hydrOXYzine (ATARAX) tablet 10 mg  10 mg Oral TID PRN Maureen Perez MD   10 mg at 02/07/22 0852    ampicillin-sulbactam (UNASYN) 3000 mg ivpb minibag  3,000 mg IntraVENous Q6H Patti Bowman MD   Stopped at 02/07/22 0642    simethicone (MYLICON) chewable tablet 80 mg  80 mg Oral Q6H PRN Ramakrishna Ludwig MD   80 mg at 02/07/22 0830    enoxaparin (LOVENOX) injection 30 mg  30 mg SubCUTAneous BID Niall Florian, DPM   30 mg at 02/01/22 2028    albuterol sulfate  (90 Base) MCG/ACT inhaler 2 puff  2 puff Inhalation Q4H PRN Niall Florian, DPM   2 puff at 02/01/22 1712    budesonide-formoterol (SYMBICORT) 80-4.5 MCG/ACT inhaler 2 puff  2 puff Inhalation BID Niall Florian, DPM   2 puff at 02/06/22 2111    dicyclomine (BENTYL) capsule 10 mg  10 mg Oral 4x Daily Niall Florian, DPM   10 mg at 02/07/22 0830    fluticasone (FLONASE) 50 MCG/ACT nasal spray 1 spray  1 spray Each Nostril Daily Niall Florian, DPM   1 spray at 02/06/22 0907    mirtazapine (REMERON) tablet 7.5 mg  7.5 mg Oral Nightly Niall Florian, DPM   7.5 mg at 02/06/22 2111    pantoprazole (PROTONIX) tablet 20 mg  20 mg Oral BID AC Niall Florian, DPM   20 mg at 02/07/22 0830    pregabalin (LYRICA) capsule 200 mg  200 mg Oral BID Niall Florian, DPM   200 mg at 02/07/22 0830    traZODone (DESYREL) tablet 150 mg  150 mg Oral Nightly Niall Florian, DPM   150 mg at 02/06/22 2110    venlafaxine (EFFEXOR XR) extended release capsule 150 mg  150 mg Oral Daily with breakfast Felicitas Alfaro DPM   150 mg at 02/07/22 0830    sodium chloride flush 0.9 % injection 5-40 mL  5-40 mL IntraVENous 2 times per day Felicitas SARA Alfaro   10 mL at 02/05/22 2113    sodium chloride flush 0.9 % injection 5-40 mL  5-40 mL IntraVENous PRN Felicitas Alfaro DPM        0.9 % sodium chloride infusion  25 mL IntraVENous PRN Felicitas Alfaro DPM        ondansetron (ZOFRAN-ODT) disintegrating tablet 4 mg  4 mg Oral Q8H PRN Felicitas Alfaro DPM   4 mg at 01/29/22 1201    Or    ondansetron (ZOFRAN) injection 4 mg  4 mg IntraVENous Q6H PRN Felicitas Alfaro DPM        polyethylene glycol (GLYCOLAX) packet 17 g  17 g Oral Daily PRN Felicitas Alfaro DPM   17 g at 02/07/22 0829    acetaminophen (TYLENOL) tablet 650 mg  650 mg Oral Q6H PRN Felicitas Alfaro DPM   650 mg at 02/07/22 0022    Or    acetaminophen (TYLENOL) suppository 650 mg  650 mg Rectal Q6H PRN Felicitas Alfaro DPM        potassium chloride (KLOR-CON M) extended release tablet 40 mEq  40 mEq Oral PRN Felicitas Alfaro DPM        Or    potassium bicarb-citric acid (EFFER-K) effervescent tablet 40 mEq  40 mEq Oral PRN Felicitas Alfaro DPM        Or    potassium chloride 10 mEq/100 mL IVPB (Peripheral Line)  10 mEq IntraVENous PRN Felicitas Alfaro DPM        insulin lispro (HUMALOG) injection vial 0-12 Units  0-12 Units SubCUTAneous TID WC Felicitas Alfaro DPM   2 Units at 02/07/22 0817    insulin lispro (HUMALOG) injection vial 0-6 Units  0-6 Units SubCUTAneous Nightly Felicitas Alfaro DPM   1 Units at 02/06/22 2111    glucose (GLUTOSE) 40 % oral gel 15 g  15 g Oral PRN Felicitas Alfaro DPM   15 g at 01/26/22 0350    dextrose 50 % IV solution  12.5 g IntraVENous PRN Felicitas Alfaro DPM        glucagon (rDNA) injection 1 mg  1 mg IntraMUSCular PRN Felicitas Alfaro DPM        dextrose 5 % solution  100 mL/hr IntraVENous PRN Felicitas Alfaro DPM           ASSESSMENT:     Active Problems:    Type 2 diabetes mellitus without complication, with long-term current use of insulin (HCC)    Right foot infection    Cellulitis and abscess of toe of right foot    Abscess of right foot    Bilateral cellulitis of lower leg related to related to  uncontrolled diabetes    Right foot ulcer, with fat layer exposed ([address]    Ulcer of left foot, with fat layer exposed ([address]    CRP elevated    Status post incision and drainage  Resolved Problems:    * No resolved hospital problems. *      PLAN:     Right foot wound s/p I&D x 2  - Afebrile   - S/p R foot I&D   - Wound culture positive for MSSA, group A strep, Finegoldia, Aerococcus   - Podiatry on board              - No further surgical intervention at this time               - WBAT              - Discharge   - ID on board              - On Unasyn              - DC home on Invanz 1g daily x 2 weeks, with possible Augmentin afterwards              - FU outpatient office in 2 weeks  - Pain management, Roxicodone 5mg q6h  - MRI concerning for possible abscess vs phlegmon, no osteomyelitis      MARTIN, improving   - Creatinine 1.22-> 0.97  - IV NS 100ml/hr   - 500cc bolus of NS on 2/6/2022     DM2  - A1C 10   - Glucose 169  - Lantus 25U BID   - Medium dose sliding scale   - Hypoglycemia protocol      Bipolar disorder  - Continue Remeron 7.5mg PO nightly   - Effexor  mg PO daily   - Trazodone 150 mg PO nightly      DVT prophylaxis: Lovenox 40 mg SC  GI prophylaxis: Protonix 20 mg BID   Dispo: Patient pre-CERT pending, discharge orders in, likely discharge 2/7/2022.        Discussed care plan with nurse after getting her input.     Above plan discussed with the patient, who agreed to the above plan      Plan will be discussed with the attending, Dr. Perez.        Hasmukh Smith MD  Family Medicine Resident  2/7/2022 9:02 AM        Please note that this chart was generated using voice recognition Dragon dictation software.   Although every effort was made to ensure the accuracy of this automated transcription, some errors in transcription may have occurred

## 2022-02-07 NOTE — PROGRESS NOTES
Physical Therapy        Physical Therapy Cancel Note      DATE: 2022    NAME: Carmela Kay  MRN: [id number]   : 1965      Patient not seen this date for Physical Therapy due to:    Patient Declined: Pt states she is tired and cold, not wanting to participate in therapy this afternoon. Will check back tomorrow.       Electronically signed by Ludwin Caal PTA on 2022 at 2:10 PM

## 2022-02-07 NOTE — PROGRESS NOTES
Comprehensive Nutrition Assessment    Type and Reason for Visit:  Reassess    Nutrition Recommendations/Plan:   -Continue with current diet, monitor and encourage adequate po intake    Nutrition Assessment:  Discussed with RN, reports pt continues to tolerate diet, taking meals well. Malnutrition Assessment:  Malnutrition Status: At risk for malnutrition (Comment)    Context:  Acute Illness         Estimated Daily Nutrient Needs:  Energy (kcal):  MSJ x 1.05-1.1 = 6101-8176 kcals/day; Weight Used for Energy Requirements:  Current     Protein (g):  1.2-1.5 gm/kg = 70-90 gm pro/day; Weight Used for Protein Requirements:  Ideal        Fluid (ml/day):  2100 mL/day or per MD; Method Used for Fluid Requirements:   (Mj Pillai)      Nutrition Related Findings:  labs/meds reviewed      Wounds:  Surgical Incision (to right foot)       Current Nutrition Therapies:    ADULT DIET; Regular; 3 carb choices (45 gm/meal)    Anthropometric Measures:  · Height: 5' 6\" (167.6 cm)  · Current Body Weight: 251 lb 8.7 oz (114.1 kg)   · Admission Body Weight: 251 lb 15.8 oz (114.3 kg)    · Usual Body Weight: 218 lb 7.6 oz (99.1 kg) (11/4/21 bed scale per chart)     · Ideal Body Weight: 130 lbs; % Ideal Body Weight 186.5 %   · BMI: 40.6  · BMI Categories: Obese Class 2 (BMI 35.0 -39.9)       Nutrition Diagnosis:   · Increased nutrient needs related to  (healing) as evidenced by wounds      Nutrition Interventions:   Food and/or Nutrient Delivery:  Continue Current Diet  Nutrition Education/Counseling:  No recommendation at this time   Coordination of Nutrition Care:  Continue to monitor while inpatient    Goals:  Oral intakes to meet at least 75% of estimated nutrition needs.        Nutrition Monitoring and Evaluation:   Behavioral-Environmental Outcomes:  None Identified   Food/Nutrient Intake Outcomes:  Food and Nutrient Intake  Physical Signs/Symptoms Outcomes:  Meal Time Behavior,Weight,Nutrition Focused Physical Findings,Biochemical Data     Discharge Planning:     Too soon to determine     Electronically signed by Subha Adams RD, LD on 2/7/22 at 3:25 PM EST    Contact: [phone]

## 2022-02-07 NOTE — CARE COORDINATION
[facility] Flow/Interdisciplinary Rounds Progress Note    Quality Flow Rounds held on February 7, 2022 at [address] [facility] Attending:  Bedside Nurse and     Barriers to Discharge: precert for SNF    Anticipated Discharge Date:       Anticipated Discharge Disposition:    Readmission Risk              Risk of Unplanned Readmission:  60           Discussed patient goal for the day, patient clinical progression, and barriers to discharge. The following Goal(s) of the Day/Commitment(s) have been identified:  SNF Discharge - Check H&P Update, Medication Reconciliation, and Transfer Form   Called and left VM for Ceferino at [facility] re: precert, requested call back. 1615 Received call from Lorena at [facility], she states they have precert and are able to accept patient. 1630 Transport requested per [facility] network for first available time. 1700 Call from [place] at [address] [facility] she states first available time is 9:45 pm. Called and spoke with Ceferino at [facility], she states 9:45 pm is to late to accept patient.  Called and spoke with Sudarshan at [facility], transport set for tomorrow at 11:30 am.  Report # [phone]  Fax # [phone]      Avani Perez RN  February 7, 2022

## 2022-02-08 VITALS
HEIGHT: 66 IN | DIASTOLIC BLOOD PRESSURE: 87 MMHG | SYSTOLIC BLOOD PRESSURE: 136 MMHG | RESPIRATION RATE: 16 BRPM | WEIGHT: 252.1 LBS | OXYGEN SATURATION: 95 % | HEART RATE: 84 BPM | TEMPERATURE: 98.6 F | BODY MASS INDEX: 40.52 KG/M2

## 2022-02-08 LAB
ABSOLUTE EOS #: 0.26 K/UL (ref 0–0.4)
ABSOLUTE IMMATURE GRANULOCYTE: 0 K/UL (ref 0–0.3)
ABSOLUTE LYMPH #: 1.73 K/UL (ref 1–4.8)
ABSOLUTE MONO #: 0.51 K/UL (ref 0.1–0.8)
ANION GAP SERPL CALCULATED.3IONS-SCNC: 12 MMOL/L (ref 9–17)
BASOPHILS # BLD: 0 % (ref 0–2)
BASOPHILS ABSOLUTE: 0 K/UL (ref 0–0.2)
BUN BLDV-MCNC: 15 MG/DL (ref 6–20)
BUN/CREAT BLD: ABNORMAL (ref 9–20)
CALCIUM SERPL-MCNC: 8.7 MG/DL (ref 8.6–10.4)
CHLORIDE BLD-SCNC: 106 MMOL/L (ref 98–107)
CO2: 22 MMOL/L (ref 20–31)
CREAT SERPL-MCNC: 0.8 MG/DL (ref 0.5–0.9)
DIFFERENTIAL TYPE: ABNORMAL
EOSINOPHILS RELATIVE PERCENT: 4 % (ref 1–4)
GFR AFRICAN AMERICAN: >60 ML/MIN
GFR NON-AFRICAN AMERICAN: >60 ML/MIN
GFR SERPL CREATININE-BSD FRML MDRD: ABNORMAL ML/MIN/{1.73_M2}
GFR SERPL CREATININE-BSD FRML MDRD: ABNORMAL ML/MIN/{1.73_M2}
GLUCOSE BLD-MCNC: 132 MG/DL (ref 70–99)
GLUCOSE BLD-MCNC: 134 MG/DL (ref 65–105)
GLUCOSE BLD-MCNC: 55 MG/DL (ref 65–105)
GLUCOSE BLD-MCNC: 69 MG/DL (ref 65–105)
HCT VFR BLD CALC: 24.8 % (ref 36.3–47.1)
HEMOGLOBIN: 7.5 G/DL (ref 11.9–15.1)
IMMATURE GRANULOCYTES: 0 %
LYMPHOCYTES # BLD: 27 % (ref 24–44)
MCH RBC QN AUTO: 31.3 PG (ref 25.2–33.5)
MCHC RBC AUTO-ENTMCNC: 30.2 G/DL (ref 28.4–34.8)
MCV RBC AUTO: 103.3 FL (ref 82.6–102.9)
MONOCYTES # BLD: 8 % (ref 1–7)
MORPHOLOGY: ABNORMAL
NRBC AUTOMATED: 0 PER 100 WBC
PDW BLD-RTO: 14.6 % (ref 11.8–14.4)
PLATELET # BLD: 447 K/UL (ref 138–453)
PLATELET ESTIMATE: ABNORMAL
PMV BLD AUTO: 10.3 FL (ref 8.1–13.5)
POTASSIUM SERPL-SCNC: 4.4 MMOL/L (ref 3.7–5.3)
RBC # BLD: 2.4 M/UL (ref 3.95–5.11)
RBC # BLD: ABNORMAL 10*6/UL
SEG NEUTROPHILS: 61 % (ref 36–66)
SEGMENTED NEUTROPHILS ABSOLUTE COUNT: 3.9 K/UL (ref 1.8–7.7)
SODIUM BLD-SCNC: 140 MMOL/L (ref 135–144)
WBC # BLD: 6.4 K/UL (ref 3.5–11.3)
WBC # BLD: ABNORMAL 10*3/UL

## 2022-02-08 PROCEDURE — 2580000003 HC RX 258

## 2022-02-08 PROCEDURE — 6360000002 HC RX W HCPCS: Performed by: INTERNAL MEDICINE

## 2022-02-08 PROCEDURE — 99239 HOSP IP/OBS DSCHRG MGMT >30: CPT | Performed by: STUDENT IN AN ORGANIZED HEALTH CARE EDUCATION/TRAINING PROGRAM

## 2022-02-08 PROCEDURE — 6370000000 HC RX 637 (ALT 250 FOR IP): Performed by: PODIATRIST

## 2022-02-08 PROCEDURE — 6370000000 HC RX 637 (ALT 250 FOR IP)

## 2022-02-08 PROCEDURE — 2580000003 HC RX 258: Performed by: INTERNAL MEDICINE

## 2022-02-08 PROCEDURE — 6360000002 HC RX W HCPCS

## 2022-02-08 PROCEDURE — 85025 COMPLETE CBC W/AUTO DIFF WBC: CPT

## 2022-02-08 PROCEDURE — 82947 ASSAY GLUCOSE BLOOD QUANT: CPT

## 2022-02-08 PROCEDURE — 36415 COLL VENOUS BLD VENIPUNCTURE: CPT

## 2022-02-08 PROCEDURE — 80048 BASIC METABOLIC PNL TOTAL CA: CPT

## 2022-02-08 PROCEDURE — 99232 SBSQ HOSP IP/OBS MODERATE 35: CPT | Performed by: INTERNAL MEDICINE

## 2022-02-08 RX ORDER — DIPHENHYDRAMINE HYDROCHLORIDE 50 MG/ML
12.5 INJECTION INTRAMUSCULAR; INTRAVENOUS ONCE
Status: COMPLETED | OUTPATIENT
Start: 2022-02-08 | End: 2022-02-08

## 2022-02-08 RX ADMIN — AMPICILLIN SODIUM AND SULBACTAM SODIUM 3000 MG: 2; 1 INJECTION, POWDER, FOR SOLUTION INTRAMUSCULAR; INTRAVENOUS at 00:08

## 2022-02-08 RX ADMIN — DICYCLOMINE HYDROCHLORIDE 10 MG: 10 CAPSULE ORAL at 09:03

## 2022-02-08 RX ADMIN — VENLAFAXINE HYDROCHLORIDE 150 MG: 150 CAPSULE, EXTENDED RELEASE ORAL at 09:03

## 2022-02-08 RX ADMIN — PANTOPRAZOLE SODIUM 20 MG: 40 TABLET, DELAYED RELEASE ORAL at 06:32

## 2022-02-08 RX ADMIN — INSULIN GLARGINE 25 UNITS: 100 INJECTION, SOLUTION SUBCUTANEOUS at 09:05

## 2022-02-08 RX ADMIN — OXYCODONE 5 MG: 5 TABLET ORAL at 09:03

## 2022-02-08 RX ADMIN — AMPICILLIN SODIUM AND SULBACTAM SODIUM 3000 MG: 2; 1 INJECTION, POWDER, FOR SOLUTION INTRAMUSCULAR; INTRAVENOUS at 06:00

## 2022-02-08 RX ADMIN — DIPHENHYDRAMINE HYDROCHLORIDE 12.5 MG: 50 INJECTION, SOLUTION INTRAMUSCULAR; INTRAVENOUS at 06:32

## 2022-02-08 RX ADMIN — ACETAMINOPHEN 650 MG: 325 TABLET ORAL at 02:02

## 2022-02-08 RX ADMIN — PREGABALIN 200 MG: 100 CAPSULE ORAL at 09:03

## 2022-02-08 RX ADMIN — OXYCODONE 5 MG: 5 TABLET ORAL at 03:43

## 2022-02-08 RX ADMIN — SODIUM CHLORIDE: 9 INJECTION, SOLUTION INTRAVENOUS at 02:03

## 2022-02-08 ASSESSMENT — ENCOUNTER SYMPTOMS
COUGH: 0
APNEA: 0
COLOR CHANGE: 1
EYE DISCHARGE: 0
PHOTOPHOBIA: 0
EYE ITCHING: 0
SHORTNESS OF BREATH: 0
CHEST TIGHTNESS: 0
ABDOMINAL DISTENTION: 0
CHOKING: 0
EYE REDNESS: 0
EYE PAIN: 0

## 2022-02-08 ASSESSMENT — PAIN SCALES - GENERAL
PAINLEVEL_OUTOF10: 10
PAINLEVEL_OUTOF10: 10
PAINLEVEL_OUTOF10: 3

## 2022-02-08 NOTE — PROGRESS NOTES
[facility] -   Infectious diseases evaluation  admission date 1/22/2022    reason for consultation:   Diabetic foot infection    Impression :   Current:  · Right diabetic foot infection, GAS  · Cellulitis both feet  · bandemia  · Bacteriuria vs UTI kleb S ancef  · CRP elevation    Other:  ·   Discussion / summary of stay / plan of care   ·   Recommendations   · foot cx MSSA R clinda  and GAS- finegoldia and aerococcus  · On  Ancef-2/1 switch to Unasyn   · MRI suggestive of a midfoot collection - podiatry feel it is a phlegmon  - tenderness better 2/3  · FU office in 2 weeks - DC on invanz 1 g daily x 2 weeks w office FU - might need po Augmentin afterwards  · Recent UC 1/24 : Klebsiella- no dysuria - but lower abd pain - on keflex till 1/29 completed    Infection Control Recommendations   · [place] Precautions  · Contact Isolation       Antimicrobial Stewardship Recommendations   · Simplification of therapy  · Targeted therapy  Coordination ofOutpatient Care:   · Estimated Length of IV antimicrobials:  · Patient will need Midline / picc Catheter Insertion:   · Patient will need SNF:  · Patient will need outpatient wound care:     History of Present Illness:   Initial history:  Felisa Bowles is a 49y.o.-year-old female with diabetes very well-known to my service, stepped on a piece of glass a few days prior to admission presented with swelling and drainage from right foot, culture came back with group A strep,  Patient has neuropathy from diabetes  WBC elevated 14,  sed [id number],     Podiatry debrided the superficial ulcer and felt it was not too deep yet there was cellulitis of both feet.   Hemoglobin A1c was 10    MRI of the left foot no osteomyelitis  MRI of the right foot no osteomyelitis    Leukocytosis responded to antibiotics, infectious consulted for antibiotic management  Patient is on cefepime and clindamycin    R foot very swollen and tender to light touch - suspect deeper involvement and needs more I/D    1/26  Pt is afebrile and stable this morning. WBC trending up (14 today). UC 1/24 growing Klebsiella. CT right leg 1/25 shows an abscess  Planned for I/D on 1/27  Pain still ++    1/27  Pt seen and examined at bedside, afebrile and hypotensive this morning. Endorsed a lot of pain overnight. WBC improved to 11   To go to OR today for I+D of R foot abscess. Started on Keflex until 1/29 for UTI  Will monitor on antibiotics and await podiatry recs    1/28  Pt afebrile and stable s/p I+D right foot yesterday. Pt c/o pain this morning but is managing. Pt also endorsing abdominal discomfort. XR Abd showing increased stool burden. WBC did increase to 15 from 11 yesterday. Wound Cx 1/27: pending but GPC in pairs seen. Will monitor on Keflex and Clinda    1/29  No fever  Lab: MSSA in wound , but clinda is R  Switching to ceftriaxone  Foot better - less edema and drainage -tender still    1/30  Right foot  but discharge is minimal from it. Otherwise she has no abdominal pain or diarrhea CRP is improved 54    1/31  Alert appropriate, right foot continues to be swollen very tender to touch and it is painful. The wound is not draining much though. No redness noticed. Podiatry watching at this point. Continue antibiotic  WBC is 10 and CRP continues to be 53 has not improved in today    2/1  Right foot remains tender to light touch over the sole and over the first metatarsal  Edema slightly better over the ankle and the dorsum but continues to be present over the shoulder without any drainage  Discussed with Dr. Tushar Taylor. , he will wait another few days and allow the antibiotics to work before deciding further procedure  Cultures are now growing Aerococcus and Mechelle via, on top of the MSSA and the group A strep    2/2  Foot remains very tender around the hallux and the small area, same areas.   Not improved  MRI suggestive of a collection that could be an abscess    2/3  Foot tenderness improved   Per podiatry this is a phlegmon on MRI and not an ABSCESS  Pt agreed to rehab - x few weeks  Will DC on invanz 2-3 weeks and FU office and anticipate po AB at some point -  Also FU w podiatry office to FU on progress of the foot    2/4  Pt is feeling much better, denies any other active complaints apart from the pain and discomfort on the right foot. No purulent discharge visible. Tenderness improved  Ongoing discharge planning to SNF. Precert restarted. Awaiting placement. 2/5   Pain is under control with the current regimen. Its mildly tender. Minimal serous discharge present  No significant change in the clinical status of the patient. Pt working with PT. Stable for discharge, ongoing precert.      2/6  Afebrile, vitals stable. Has daily dressing change. No surgical intervention planned by Podiatry. She had mild MARTIN, encouraged to drink adequate amount of fluids  She denies any urinary complaints. 2/7   Afebrile. Hemodynamically stable. Had hypoglycemia episode overnight, resolved. Overall doing much better. Has persistent pain in the right foot. Tenderness and erythema improving. She is tolerating oral intake well, has constipation, bowel regimen was given. 2/8  Afebrile. Vitals stable. Wound is improving. No visible drainage. Tenderness and erythema improved. She will be discharged today to SNF.      Interval changes  2/8/2022     /87   Pulse 84   Temp 98.6 °F (37 °C) (Oral)   Resp 16   Ht 5' 6\" (1.676 m)   Wt 252 lb 1.6 oz (114.4 kg)   LMP  (LMP Unknown)   SpO2 95%   BMI 40.69 kg/m²      Summary of relevant labs:  Labs:  W14 > 11>15-10->7.5->6.3->6-> 6.4  [id number]  -54-53  Creat 0.97-> 0.80    Micro:  cx foot pus GAS, (anaerococcus prevotii, finegoldia jessica light growth, which were not isolated at 5 days.)   Uc 1/24: Klebsiella  Wound Cx 1/27: GPC pairs/GAS     Imaging:  MRI of the right foot 2/2  Soft tissue defect along the plantar and medial surface of the midfoot with   suspected irregular fluid collection measuring 4.4 x 1.2 x 1.8 cm, which may   represent an abscess or phlegmon. XR abd 1/27: Increased stool burden seen diffusely throughout the colon suggesting   clinical presentation of constipation.  Phleboliths seen on both sides of the   pelvis. CT R foot 1/25:  1. Shallow soft tissue ulceration plantar and medial to the 1st metatarsal   base with an underlying area of possible abscess versus phlegmon measuring   2.8 x 1.1 x 0.9 cm.   2. Extensive subcutaneous fat stranding compatible with cellulitis.  No soft   tissue gas. 3. No evidence of osteomyelitis or other acute osseous abnormality. U/S renal 1/25: Unremarkable    MRI R foot 1/23:  Subcutaneous edema and swelling, most notably affecting the dorsum of the   foot, which may reflect underlying cellulitis.  No discrete organized fluid   collection is seen within the limitations of a noncontrast study.       No imaging features of acute osteomyelitis appreciated. MRI L foot: 1/23:   Limited evaluation due to patient motion artifact on several sequences.       Skin thickening and subcutaneous edema seen within the plantar soft tissues   of the foot at the level of the proximal metatarsals, which may reflect   underlying cellulitis.  Within the limitations of a noncontrast study, no   discrete organized fluid collection is seen.       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 I have personally reviewed the past medical history, past surgical history, medications, social history, and family history, and I haveupdated the database accordingly.       Allergies:   Bactrim [sulfamethoxazole-trimethoprim] and Adhesive tape     Review of Systems:     Review of Systems   Constitutional: Negative for activity change, appetite change, chills, diaphoresis and fatigue. HENT: Negative for congestion. Eyes: Negative for photophobia, pain, discharge, redness, itching and visual disturbance. Respiratory: Negative for apnea, cough, choking, chest tightness and shortness of breath. Cardiovascular: Negative for chest pain. Gastrointestinal: Negative for abdominal distention. Endocrine: Negative for cold intolerance, heat intolerance, polydipsia and polyphagia. Genitourinary: Negative for dysuria and flank pain. Musculoskeletal: Negative for arthralgias. Skin: Positive for color change and wound. Allergic/Immunologic: Negative for environmental allergies, food allergies and immunocompromised state. Neurological: Negative for dizziness, tremors, seizures, syncope, light-headedness, numbness and headaches. Hematological: Negative for adenopathy. Psychiatric/Behavioral: Negative for agitation. Physical Examination :       Physical Exam  Constitutional:       General: She is not in acute distress. Appearance: Normal appearance. She is not ill-appearing, toxic-appearing or diaphoretic. HENT:      Head: Normocephalic and atraumatic. Nose: Nose normal. No congestion or rhinorrhea. Eyes:      General: No scleral icterus. Pupils: Pupils are equal, round, and reactive to light. Cardiovascular:      Rate and Rhythm: Normal rate and regular rhythm. Heart sounds: Normal heart sounds. No murmur heard. No friction rub. No gallop. Pulmonary:      Effort: No respiratory distress. Breath sounds: Normal breath sounds. No wheezing. Abdominal:      General: There is no distension. Palpations: Abdomen is soft. There is no mass. Tenderness: There is no abdominal tenderness. There is no rebound. Hernia: No hernia is present. Genitourinary:     Comments: Urine fatuma  Musculoskeletal:         General: Swelling present. No tenderness, deformity or signs of injury. Cervical back: Neck supple.  No rigidity or  Suicidal ideation     Suicidal intent 3/10/2017    Vitamin D deficiency 11/28/2017    White matter changes 6/13/2017       Past Surgical  History:     Past Surgical History:   Procedure Laterality Date    ABDOMEN SURGERY      drain tube    ABSCESS DRAINAGE      right buttock    CATARACT REMOVAL WITH IMPLANT Bilateral     CHEST TUBE INSERTION      FINGER AMPUTATION Left 03/13/2021    LEFT RING FINER AMPUTATION performed by [facility] Houes [facility] at [facility] Right 10/11/2021    AMPUTATION RIGHT INDEX FINGER performed by Sharon House MD at [address] [place] [address] Right 1/27/2022    FOOT ABSCESS INCISION AND DRAINAGE  (PULSE LAVAGE, CULTURE SWABS) performed by Anton Bustamante DPM at [address] [place] [address] Right 01/27/2022    FOOT ABSCESS INCISION AND DRAINAGE (PULSE LAVAGE, CULTURE SWABS) - Right    HAND SURGERY Right 09/14/2021    I&D INDEX FINGER performed by Sharon House MD at [facility] [address] [place] [address] Right 09/15/2021    I&D INDEX FINGER performed by Sharon House MD at [place] [address]  2/3/2022         LASIK Bilateral        Medications:         Social History:     Social History     Socioeconomic History    Marital status: Legally      Spouse name: Not on file    Number of children: Not on file    Years of education: Not on file    Highest education level: Not on file   Occupational History    Not on file   Tobacco Use    Smoking status: Never Smoker    Smokeless tobacco: Never Used   Vaping Use    Vaping Use: Never used   Substance and Sexual Activity    Alcohol use: Not Currently    Drug use: Yes     Types: Marijuana (Blenda Mary), Cocaine     Comment: + Cocaine 2/2021 also, see Care Everywhere UDS    Sexual activity: Not Currently     Partners: Male   Other Topics Concern    Not on file   Social History Narrative    Not on file     Social Determinants of Health     Financial Resource Strain: High Risk    Difficulty of Paying Living Expenses: Hard   Food Insecurity: Food Insecurity Present    Worried About Running Out of Food in the Last Year: Often true    Kelin of Food in the Last Year: Often true   Transportation Needs: Unmet Transportation Needs    Lack of Transportation (Medical):  Yes    Lack of Transportation (Non-Medical): Yes   Physical Activity: Inactive    Days of Exercise per Week: 0 days    Minutes of Exercise per Session: 0 min   Stress: Stress Concern Present    Feeling of Stress : Rather much   Social Connections:     Frequency of Communication with Friends and Family: Not on file    Frequency of Social Gatherings with Friends and Family: Not on file    Attends Tenriism Services: Not on file    Active Member of Clubs or Organizations: Not on file    Attends Club or Organization Meetings: Not on file    Marital Status: Not on file   Intimate Partner Violence:     Fear of Current or Ex-Partner: Not on file    Emotionally Abused: Not on file    Physically Abused: Not on file    Sexually Abused: Not on file   Housing Stability: High Risk    Unable to Pay for Housing in the Last Year: Yes    Number of Places Lived in the Last Year: 2    Unstable Housing in the Last Year: Yes       Family History:     Family History   Problem Relation Age of Onset    Diabetes Mother     Stroke Mother     Diabetes Father     Diabetes Sister     Diabetes Brother     Other Son         GSW    No Known Problems Sister       Medical Decision Making:   I have independently reviewed/ordered the following labs:    CBC with Differential:   Recent Labs     02/07/22  0559 02/08/22  0658   WBC 6.0 6.4   HGB 7.3* 7.5*   HCT 24.9* 24.8*    447   LYMPHOPCT 49* 27   MONOPCT 12* 8*     BMP:  Recent Labs     02/07/22  0559 02/08/22  0551    140   K 4.6 4.4   * 106   CO2 20 22   BUN 21* 15   CREATININE 0.97* 0.80     Hepatic Function Panel:   No results for input(s): PROT, LABALBU, BILIDIR, IBILI, BILITOT, ALKPHOS, ALT, AST in the last 72 hours. No results for input(s): RPR in the last 72 hours. No results for input(s): HIV in the last 72 hours. No results for input(s): BC in the last 72 hours. Lab Results   Component Value Date    CREATININE 0.80 02/08/2022    GLUCOSE 132 02/08/2022       Detailed results:    Michelle Cerda MD  Internal Medicine Resident, PGY-1  Infectious Disease Service,  [facility]. I have discussed the care of the patient, including pertinent history and exam findings,  with the resident. I have seen and examined the patient and the key elements of all parts of the encounter have been performed by me. I agree with the assessment, plan and orders as documented by the resident.     Maylin Puga, Infectious Diseases

## 2022-02-08 NOTE — PROGRESS NOTES
[facility]  Progress Note    Date:   2/8/2022  Patient name:  Keara Forman  Date of admission:  1/22/2022  4:43 PM  MRN:   [id number]  YOB: 1965    SUBJECTIVE/Last 24 hours update:     Patient seen and examined at the bed side , no new acute events overnight. Patient is doing well, does have continued pruritis from adhesive tape from midline. Patient was also not happy that her pain medication interval was increased from q4h to q6h - explained to patient that it has been over 14 days since procedure and that she needs to start coming off of pain meds. Patient was very restless at bedside. She will not be going home on pain meds. Discharge to [facility] today. Notes from nursing staff and Consults had been reviewed, and the overnight progress had been checked with the nursing staff as well.     REVIEW OF SYSTEMS:      CONSTITUTIONAL:  no fevers, no headcahes  EYES: negative for blury vision  HEENT: No headaches, No nasal congestion, no difficulty swallowing  RESPIRATORY: + mild dyspnea, no wheezing, no Cough  CARDIOVASCULAR: negative for chest pain, no palpitations  GASTROINTESTINAL: no nausea, no vomiting, no change in bowel habits, no abdominal pain   GENITOURINARY: negative for dysuria, no hematuria   MUSCULOSKELETAL: + Right foot pain  NEUROLOGICAL: No  Weakness or numbness  SKIN: + itching from midline adhesive tape    PAST MEDICAL HISTORY:      has a past medical history of Acid reflux, Acute cystitis with hematuria, Acute non-recurrent maxillary sinusitis, Asthma, Bipolar 1 disorder ([address] Bipolar disorder, mixed ([address] BMI 34.0-34.9,adult, Cannabis use disorder, severe, dependence ([address] Cerebrovascular accident (CVA) ([address] Chest pain, Chronic renal insufficiency, Cocaine abuse ([address] COVID-19 virus RNA test result unknown, DDD (degenerative disc disease), cervical, Diabetes mellitus ([address] Dizziness, Fibromyalgia, History of stroke, Homicidal ideation, Hyperglycemia, Hypertension, Hypotension, IDDM (insulin dependent diabetes mellitus), Lupus ([facility] Migraine, Neuropathy, Neuropathy, Polysubstance abuse ([facility] Post traumatic stress disorder (PTSD), Posttraumatic stress disorder, Recurrent depression ([address] Recurrent major depressive disorder, in partial remission ([address] Screening mammogram, encounter for, Severe recurrent major depression with psychotic features ([facility] Severe recurrent major depression without psychotic features ([address] Stroke (cerebrum) ([facility] [address] Stroke ([facility] [address] Suicidal ideation, Suicidal intent, Vitamin D deficiency, and White matter changes. PAST SURGICAL HISTORY:      has a past surgical history that includes Abscess Drainage; chest tube insertion; Abdomen surgery; Cataract removal with implant (Bilateral); LASIK (Bilateral); Finger amputation (Left, 03/13/2021); Hand surgery (Right, 09/14/2021); Hand surgery (Right, 09/15/2021); Finger amputation (Right, 10/11/2021); Foot surgery (Right, 01/27/2022); Foot Debridement (Right, 1/27/2022); and Insert Midline Catheter (2/3/2022). SOCIAL HISTORY:      reports that she has never smoked. She has never used smokeless tobacco. She reports previous alcohol use. She reports current drug use. Drugs: Marijuana (Weed) and Cocaine. FAMILY HISTORY:     family history includes Diabetes in her brother, father, mother, and sister; No Known Problems in her sister; Other in her son; Stroke in her mother. HOME MEDICATIONS:      Prior to Admission medications    Medication Sig Start Date End Date Taking? Authorizing Provider   ertapenem [facility]) infusion Infuse 1,000 mg intravenously every 24 hours for 14 days Compound per protocol. 1/30/22 2/13/22 Yes Maylin Sands MD   pregabalin (LYRICA) 200 MG capsule Take 1 capsule by mouth 2 times daily for 30 days.  1/20/22 2/19/22  Loy Rodriguez MD   insulin glargine (LANTUS SOLOSTAR) 100 UNIT/ML injection pen Inject 30 Units into the skin 2 times daily 1/20/22   Sary Chávez MD   fluticasone Mittie Running) 50 MCG/ACT nasal spray 1 spray by Each Nostril route daily 12/22/21   Parish Gracia MD   acetaminophen (TYLENOL) 500 MG tablet Take 2 tablets by mouth 3 times daily as needed for Pain 12/22/21   Parish Gracia MD   dicyclomine (BENTYL) 10 MG capsule Take 1 capsule by mouth 4 times daily 12/17/21   Parish Gracia MD   metFORMIN (GLUCOPHAGE) 1000 MG tablet Take 1 tablet by mouth 2 times daily (with meals) 12/17/21   Parish Gracia MD   mirtazapine (REMERON) 7.5 MG tablet Take 1 tablet by mouth nightly 12/17/21   Parish Gracia MD   pantoprazole (PROTONIX) 20 MG tablet Take 1 tablet by mouth 2 times daily (before meals) 12/17/21   Parish Gracia MD   traZODone (DESYREL) 150 MG tablet Take 1 tablet by mouth nightly 12/17/21   Parish Gracia MD   venlafaxine (EFFEXOR XR) 150 MG extended release capsule Take 1 capsule by mouth daily (with breakfast) 12/17/21   Parish Gracia MD   ibuprofen (ADVIL;MOTRIN) 800 MG tablet Take 1 tablet by mouth every 8 hours as needed for Pain 11/15/21 11/29/21  Tabitha Patterson MD   Lancets MISC 1 each by Does not apply route 3 times daily 11/15/21   Tabitha Patterson MD   Alcohol Swabs 70 % PADS Use 1 swab 3 times daily as needed 11/15/21   Tabitha Patterson MD   blood glucose monitor strips Test 3 times a day & as needed for symptoms of irregular blood glucose. Dispense sufficient amount for indicated testing frequency plus additional to accommodate PRN testing needs.  11/8/21   Adrian Severin, MD   Lancets MISC 1 each by Does not apply route 3 times daily 11/8/21   Adrian Severin, MD   Insulin Pen Needle ([employer] PEN NEEDLES 31G) 31G X 8 MM MISC 1 each by Does not apply route daily 11/8/21   Danyel Núñez MD   FREESTYLE LITE strip 1 each by Does not apply route 3 times daily 11/11/20   Parish Gracia MD   albuterol sulfate  (90 Base) MCG/ACT inhaler Inhale 2 puffs into the lungs every 4 hours as needed for Wheezing 10/20/20 9/22/21  Gabe Gann MD   FLOVENT  MCG/ACT inhaler Inhale 2 puffs into the lungs 2 times daily 10/20/20   Gabe Gann MD   budesonide-formoterol (SYMBICORT) 80-4.5 MCG/ACT AERO Inhale 2 puffs into the lungs 2 times daily 10/20/20   Gabe Gann MD       ALLERGIES:     Bactrim [sulfamethoxazole-trimethoprim] and Adhesive tape      OBJECTIVE:       Vitals:    02/07/22 0723 02/07/22 1948 02/08/22 0600 02/08/22 0733   BP: 119/77 (!) 144/87  136/87   Pulse: 83 87  84   Resp: 16 15  16   Temp: 98.2 °F (36.8 °C) 98.5 °F (36.9 °C)  98.6 °F (37 °C)   TempSrc: Oral Oral  Oral   SpO2: 95% 98%  95%   Weight:   252 lb 1.6 oz (114.4 kg)    Height:             Intake/Output Summary (Last 24 hours) at 2/8/2022 0856  Last data filed at 2/8/2022 0654  Gross per 24 hour   Intake 1920 ml   Output --   Net 1920 ml       PHYSICAL EXAM:  General Appearance  Alert , awake , not in acute distress  HEENT - Head is normocephalic, atraumatic. Lungs - Bilateral equal air entry , no wheezes, rales or rhonchi, aeration good  Cardiovascular - Heart sounds are normal.  Regular rhythm, normal rate without murmur, gallop or rub.   Abdomen - Soft, nontender, nondistended, no masses or organomegaly  Neurologic - There are no new focal motor or sensory deficits  Skin - No bruising or bleeding on exposed skin area  Extremities - R foot bandaged with ace wrap    DIAGNOSTICS:     Laboratory Testing:    Recent Results (from the past 24 hour(s))   POC Glucose Fingerstick    Collection Time: 02/07/22 10:50 AM   Result Value Ref Range    POC Glucose 126 (H) 65 - 105 mg/dL   POC Glucose Fingerstick    Collection Time: 02/07/22  3:59 PM   Result Value Ref Range    POC Glucose 162 (H) 65 - 105 mg/dL   POC Glucose Fingerstick    Collection Time: 02/07/22  9:33 PM   Result Value Ref Range    POC Glucose 218 (H) 65 - 105 mg/dL   POC Glucose Fingerstick    Collection Time: 02/08/22  4:39 AM Result Value Ref Range    POC Glucose 55 (L) 65 - 105 mg/dL   POC Glucose Fingerstick    Collection Time: 02/08/22  4:54 AM   Result Value Ref Range    POC Glucose 69 65 - 105 mg/dL   POC Glucose Fingerstick    Collection Time: 02/08/22  5:38 AM   Result Value Ref Range    POC Glucose 134 (H) 65 - 105 mg/dL   Basic Metabolic Panel w/ Reflex to MG    Collection Time: 02/08/22  5:51 AM   Result Value Ref Range    Glucose 132 (H) 70 - 99 mg/dL    BUN 15 6 - 20 mg/dL    CREATININE 0.80 0.50 - 0.90 mg/dL    Bun/Cre Ratio NOT REPORTED 9 - 20    Calcium 8.7 8.6 - 10.4 mg/dL    Sodium 140 135 - 144 mmol/L    Potassium 4.4 3.7 - 5.3 mmol/L    Chloride 106 98 - 107 mmol/L    CO2 22 20 - 31 mmol/L    Anion Gap 12 9 - 17 mmol/L    GFR Non-African American >60 >60 mL/min    GFR African American >60 >60 mL/min    GFR Comment          GFR Staging NOT REPORTED    CBC Auto Differential    Collection Time: 02/08/22  6:58 AM   Result Value Ref Range    WBC 6.4 3.5 - 11.3 k/uL    RBC 2.40 (L) 3.95 - 5.11 m/uL    Hemoglobin 7.5 (L) 11.9 - 15.1 g/dL    Hematocrit 24.8 (L) 36.3 - 47.1 %    .3 (H) 82.6 - 102.9 fL    MCH 31.3 25.2 - 33.5 pg    MCHC 30.2 28.4 - 34.8 g/dL    RDW 14.6 (H) 11.8 - 14.4 %    Platelets 178 425 - 193 k/uL    MPV 10.3 8.1 - 13.5 fL    NRBC Automated 0.0 0.0 per 100 WBC    Differential Type NOT REPORTED     WBC Morphology NOT REPORTED     RBC Morphology NOT REPORTED     Platelet Estimate NOT REPORTED     Immature Granulocytes 0 0 %    Seg Neutrophils 61 36 - 66 %    Lymphocytes 27 24 - 44 %    Monocytes 8 (H) 1 - 7 %    Eosinophils % 4 1 - 4 %    Basophils 0 0 - 2 %    Absolute Immature Granulocyte 0.00 0.00 - 0.30 k/uL    Segs Absolute 3.90 1.8 - 7.7 k/uL    Absolute Lymph # 1.73 1.0 - 4.8 k/uL    Absolute Mono # 0.51 0.1 - 0.8 k/uL    Absolute Eos # 0.26 0.0 - 0.4 k/uL    Basophils Absolute 0.00 0.0 - 0.2 k/uL    Morphology ANISOCYTOSIS PRESENT     Morphology MACROCYTOSIS PRESENT     Morphology 1+ POLYCHROMASIA        Current Facility-Administered Medications   Medication Dose Route Frequency Provider Last Rate Last Admin    hydrOXYzine (ATARAX) tablet 25 mg  25 mg Oral TID PRN Tylor Thakkar MD        oxyCODONE (ROXICODONE) immediate release tablet 5 mg  5 mg Oral Q6H PRN Tylor Thakkar MD   5 mg at 02/08/22 0343    0.9 % sodium chloride infusion   IntraVENous Continuous Tylor Thakkar  mL/hr at 02/08/22 0203 New Bag at 02/08/22 0203    insulin glargine (LANTUS) injection vial 25 Units  25 Units SubCUTAneous BID Tylor Thakkar MD   25 Units at 02/07/22 2151    sennosides-docusate sodium (SENOKOT-S) 8.6-50 MG tablet 2 tablet  2 tablet Oral Daily PRN Cecelia Davis MD   2 tablet at 02/07/22 0829    ampicillin-sulbactam (UNASYN) 3000 mg ivpb minibag  3,000 mg IntraVENous Q6H Maylin Rodriguez MD   Stopped at 02/08/22 0630    simethicone (MYLICON) chewable tablet 80 mg  80 mg Oral Q6H PRN Woodfin Primrose, MD   80 mg at 02/07/22 0830    enoxaparin (LOVENOX) injection 30 mg  30 mg SubCUTAneous BID Basilia Human, DPM   30 mg at 02/01/22 2028    albuterol sulfate  (90 Base) MCG/ACT inhaler 2 puff  2 puff Inhalation Q4H PRN Basilia Corcoran DPM   2 puff at 02/07/22 1616    budesonide-formoterol (SYMBICORT) 80-4.5 MCG/ACT inhaler 2 puff  2 puff Inhalation BID Basilia Human, DPM   2 puff at 02/06/22 2111    dicyclomine (BENTYL) capsule 10 mg  10 mg Oral 4x Daily Basilia Human, DPM   10 mg at 02/07/22 2139    fluticasone (FLONASE) 50 MCG/ACT nasal spray 1 spray  1 spray Each Nostril Daily Basilia Human, DPM   1 spray at 02/06/22 0907    mirtazapine (REMERON) tablet 7.5 mg  7.5 mg Oral Nightly Robertha Human, DPM   7.5 mg at 02/07/22 2140    pantoprazole (PROTONIX) tablet 20 mg  20 mg Oral BID AC Basilia Human, DPM   20 mg at 02/08/22 5423    pregabalin (LYRICA) capsule 200 mg  200 mg Oral BID Basilia Human, DPM   200 mg at 02/07/22 2140    traZODone (DESYREL) tablet 150 mg  150 mg Oral Nightly Basilia Human, DPM   150 mg at 02/07/22 2140    venlafaxine (EFFEXOR XR) extended release capsule 150 mg  150 mg Oral Daily with breakfast Wicho Sandrak, DPM   150 mg at 02/07/22 0830    sodium chloride flush 0.9 % injection 5-40 mL  5-40 mL IntraVENous 2 times per day Wicho Lick, DPM   10 mL at 02/07/22 2140    sodium chloride flush 0.9 % injection 5-40 mL  5-40 mL IntraVENous PRN Wicho Lick, DPM        0.9 % sodium chloride infusion  25 mL IntraVENous PRN Gilbert Lick, DPM        ondansetron (ZOFRAN-ODT) disintegrating tablet 4 mg  4 mg Oral Q8H PRN Gadielbert Lick, DPM   4 mg at 01/29/22 1201    Or    ondansetron (ZOFRAN) injection 4 mg  4 mg IntraVENous Q6H PRN Gadielbert Lick, DPM        polyethylene glycol (GLYCOLAX) packet 17 g  17 g Oral Daily PRN Wicho Lick, DPM   17 g at 02/07/22 0829    acetaminophen (TYLENOL) tablet 650 mg  650 mg Oral Q6H PRN Gilbert Lick, DPM   650 mg at 02/08/22 0202    Or    acetaminophen (TYLENOL) suppository 650 mg  650 mg Rectal Q6H PRN Gilbert Lick, DPM        potassium chloride (KLOR-CON M) extended release tablet 40 mEq  40 mEq Oral PRN Gilbert Lick, DPM        Or    potassium bicarb-citric acid (EFFER-K) effervescent tablet 40 mEq  40 mEq Oral PRN Gilbert Lick, DPM        Or    potassium chloride 10 mEq/100 mL IVPB (Peripheral Line)  10 mEq IntraVENous PRN Gilbert Lick, DPM        insulin lispro (HUMALOG) injection vial 0-12 Units  0-12 Units SubCUTAneous TID WC Gadielbert Lick, DPM   2 Units at 02/07/22 1616    insulin lispro (HUMALOG) injection vial 0-6 Units  0-6 Units SubCUTAneous Nightly Gadielbert Lick, DPM   2 Units at 02/07/22 2152    glucose (GLUTOSE) 40 % oral gel 15 g  15 g Oral PRN Gilbert Lick, DPM   15 g at 01/26/22 0350    dextrose 50 % IV solution  12.5 g IntraVENous PRN Gadielbert Lick, DPM        glucagon (rDNA) injection 1 mg  1 mg IntraMUSCular PRN Wicho Gabriel DPM        dextrose 5 % solution  100 mL/hr IntraVENous PRN Wicho Gabriel DPM           ASSESSMENT:     Active Problems:    Type 2 diabetes mellitus without complication, with long-term current use of insulin (HCC)    Right foot infection    Cellulitis and abscess of toe of right foot    Abscess of right foot    Bilateral cellulitis of lower leg related to related to  uncontrolled diabetes    Right foot ulcer, with fat layer exposed ([address]    Ulcer of left foot, with fat layer exposed ([address]    CRP elevated    Status post incision and drainage  Resolved Problems:    * No resolved hospital problems. *      PLAN:     Right foot wound s/p I&D x 2  - Afebrile   - S/p R foot I&D   - Wound culture positive for MSSA, group A strep, Finegoldia, Aerococcus   - Podiatry on board              - No further surgical intervention at this time               - WBAT              - Discharge   - ID on board              - On Unasyn              - DC home on Invanz 1g daily x 2 weeks, with possible Augmentin afterwards              - FU outpatient office in 2 weeks  - Pain management, Roxicodone 5mg q6h, will not go home with additional pain meds  - MRI concerning for possible abscess vs phlegmon, no osteomyelitis      MARTIN, resolved  - Creatinine 1.22-> 0.97-> 0.8  - IV NS 100ml/hr   - 500cc bolus of NS on 2/6/2022     DM2  - A1C 10   - Glucose 134  - Lantus 25U BID   - Medium dose sliding scale   - Hypoglycemia protocol      Bipolar disorder  - Continue Remeron 7.5mg PO nightly   - Effexor  mg PO daily   - Trazodone 150 mg PO nightly      DVT prophylaxis: Lovenox 40 mg SC  GI prophylaxis: Protonix 20 mg BID   Dispo:  Transport arranged for 11:30am to [facility]     Discussed care plan with nurse after getting her input.     Above plan discussed with the patient, who agreed to the above plan      Plan will be discussed with the attending, Dr. Kaylan Gonzalez MD  Family Medicine Resident  2/8/2022 8:56 AM            Please note that this chart was generated using voice recognition Dragon dictation software.   Although every effort was made to ensure the accuracy of this automated transcription, some errors in transcription may have occurred

## 2022-02-08 NOTE — PROGRESS NOTES
Progress Note    This is a 30-year-old female who presented with swelling and drainage from the right foot after stepping on a piece of glass a few days ago. She is currently status post incision and drainage of her right foot which podiatry are following for--they plan for no further surgical interventions at this time. Patient seen at bedside and reported no pain or concerns. She did report some itching near midline site. Patient appeared in no acute distress during the time of physical exam. No erythema or rash present near midline site. She did not have any pitting edema bilaterally. We will continue treatment with Roxicodone for pain and give one time dose of benadryl for irritation near midline site.     Electronically signed by Bryce Ken MD on 2/7/2022 at 7:11 PM

## 2022-02-08 NOTE — PROGRESS NOTES
Cpshadia gaines at [facility] report given and up dated on plan of treatment. Escorted per w/c to [facility]CF accompanied with belongings and paperwork.   Freddie Coleman, RN

## 2022-02-08 NOTE — DISCHARGE SUMMARY
results showed fluid collection which may represent abscess of phlegmon. Podiatry was notified and recommended no further surgical intervention, as minimal pus was released on prior I&Ds and due to patients uncontrolled diabetes, there is more risk associated with additional surgery. Patient does have history of drug abuse and will not be sent home on any pain medication. Today on day of discharge pt feels well. Vitals and Labs are at pts baseline. All consultants involved during this admission are agreeable to d/c. Patient will go to SNF to receive antibiotic therapy and to work with PT/OT. Consults:  ID, Podiatry    Significant Diagnostic/theraputic interventions: I&D x 2, IV antibiotics, MRI, IV fluids, pain management    Disposition:   SNF    Instructions to Patient: Please follow up with PCP within 1-2 weeks, wound care within 1 week and Infectious disease within 10 days. Follow up with Pavithra Chino MD in  1-2  weeks    Discharge Medications:       Medication List      START taking these medications    ertapenem  infusion  Commonly known as: INVanz  Infuse 1,000 mg intravenously every 24 hours for 14 days Compound per protocol.         CONTINUE taking these medications    acetaminophen 500 MG tablet  Commonly known as: TYLENOL  Take 2 tablets by mouth 3 times daily as needed for Pain     albuterol sulfate  (90 Base) MCG/ACT inhaler  Inhale 2 puffs into the lungs every 4 hours as needed for Wheezing     Alcohol Swabs 70 % Pads  Use 1 swab 3 times daily as needed     budesonide-formoterol 80-4.5 MCG/ACT Aero  Commonly known as: Symbicort  Inhale 2 puffs into the lungs 2 times daily     dicyclomine 10 MG capsule  Commonly known as: Bentyl  Take 1 capsule by mouth 4 times daily     Flovent  MCG/ACT inhaler  Generic drug: fluticasone  Inhale 2 puffs into the lungs 2 times daily     fluticasone 50 MCG/ACT nasal spray  Commonly known as: FLONASE  1 spray by Each Nostril route daily     * FREESTYLE LITE strip  Generic drug: blood glucose test strips  1 each by Does not apply route 3 times daily     * blood glucose test strips  Test 3 times a day & as needed for symptoms of irregular blood glucose. Dispense sufficient amount for indicated testing frequency plus additional to accommodate PRN testing needs. ibuprofen 800 MG tablet  Commonly known as: ADVIL;MOTRIN  Take 1 tablet by mouth every 8 hours as needed for Pain     [employer] Pen Needles 31G 31G X 8 MM Misc  Generic drug: Insulin Pen Needle  1 each by Does not apply route daily     * Lancets Misc  1 each by Does not apply route 3 times daily     * Lancets Misc  1 each by Does not apply route 3 times daily     Lantus SoloStar 100 UNIT/ML injection pen  Generic drug: insulin glargine  Inject 30 Units into the skin 2 times daily     metFORMIN 1000 MG tablet  Commonly known as: GLUCOPHAGE  Take 1 tablet by mouth 2 times daily (with meals)     mirtazapine 7.5 MG tablet  Commonly known as: REMERON  Take 1 tablet by mouth nightly     pantoprazole 20 MG tablet  Commonly known as: PROTONIX  Take 1 tablet by mouth 2 times daily (before meals)     pregabalin 200 MG capsule  Commonly known as: LYRICA  Take 1 capsule by mouth 2 times daily for 30 days. traZODone 150 MG tablet  Commonly known as: DESYREL  Take 1 tablet by mouth nightly     venlafaxine 150 MG extended release capsule  Commonly known as: EFFEXOR XR  Take 1 capsule by mouth daily (with breakfast)         * This list has 4 medication(s) that are the same as other medications prescribed for you. Read the directions carefully, and ask your doctor or other care provider to review them with you.             STOP taking these medications    doxycycline hyclate 100 MG tablet  Commonly known as: VIBRA-TABS           Where to Get Your Medications      You can get these medications from any pharmacy    Bring a paper prescription for each of these medications  · ertapenem  infusion         Send Copies to: Carlos Manuel Zhang MD,       Note that over 30 minutes was spent in preparing discharge papers, discussing discharge with patient and family, medication review, etc.      Johnie Majano MD  Family Medicine Resident  Family Medicine Inpatient Service  2/8/2022 10:58 AM          Please note that this chart was generated using voice recognition Dragon dictation software.   Although every effort was made to ensure the accuracy of this automated transcription, some errors in transcription may have occurred

## 2022-02-09 ENCOUNTER — TELEPHONE (OUTPATIENT)
Dept: FAMILY MEDICINE CLINIC | Age: 57
End: 2022-02-09

## 2022-02-09 DIAGNOSIS — G62.9 NEUROPATHY: ICD-10-CM

## 2022-02-09 RX ORDER — PREGABALIN 200 MG/1
200 CAPSULE ORAL 2 TIMES DAILY
Qty: 60 CAPSULE | Refills: 0 | Status: SHIPPED | OUTPATIENT
Start: 2022-02-09 | End: 2022-03-03 | Stop reason: SDUPTHER

## 2022-02-09 NOTE — TELEPHONE ENCOUNTER
[facility] Initial Follow Up Call    Outreach made within 2 business days of discharge: Yes    Patient: Ruben Briseno   Patient : 1965   MRN: [id number]    Reason for Admission: right foot infection  Discharge Date: 2022 to [facility] with: Tyrone King    Discharge department/facility: [address] Ortho/MedSurg    TCM Interactive Patient Contact:  Was patient able to fill all prescriptions: No: discharged to SNF, they will obtain medications for pt  Was patient instructed to bring all medications to the follow-up visit: Yes  Is patient taking all medications as directed in the discharge summary? Yes  Does patient understand their discharge instructions: Yes  Does patient have questions or concerns that need addressed prior to 7-14 day follow up office visit: yes - re: medications, see note below    Scheduled appointment with PCP within 7-14 days    Spoke with pt, she verbalized frustration that she was sent to [facility] \"without all of her medications. \" Writer explained that it is typically protocol for medications to be ordered once pt arrives to facility. There happened to be a nurse in pt's room at facility during this conversation and writer heard her confirm that their protocol is to obtain the medications once the pt has been admitted to facility. Pt states she was sent without any pain medications after having been receiving She in the hospital. [employer] again tried to reinforce to pt that she needs to communicate this with staff at [facility] so they can alert their in-house physician about the need for pain control. Writer requested pt call office when she is discharged so that we may schedule a follow up appt with PCP, pt agreeable to plan.     Follow Up  Future Appointments   Date Time Provider Jj Hernandez   2022  1:00 PM Nancy Garcia MD INFT DISEASE [id number]   3/9/2022  3:00 PM Nancy Garcia MD INFT DISEASE Jesús Diaz RN

## 2022-02-09 NOTE — TELEPHONE ENCOUNTER
----- Message from Shakirashea Molina sent at 2/8/2022  4:31 PM EST -----  Subject: Message to Provider    QUESTIONS  Information for Provider? Pt calling in from rehab/nursing home and needs   her meds/pain meds sent over. Please call [phone]  ---------------------------------------------------------------------------  --------------  Olivia HUNTLEY  What is the best way for the office to contact you? OK to leave message on   voicemail  Preferred Call Back Phone Number? [phone]  ---------------------------------------------------------------------------  --------------  SCRIPT ANSWERS  Relationship to Patient?  Self

## 2022-02-15 ENCOUNTER — OFFICE VISIT (OUTPATIENT)
Dept: PODIATRY | Age: 57
End: 2022-02-15

## 2022-02-15 VITALS — HEIGHT: 65 IN | WEIGHT: 242 LBS | BODY MASS INDEX: 40.32 KG/M2

## 2022-02-15 DIAGNOSIS — Z89.422 HISTORY OF AMPUTATION OF LESSER TOE OF LEFT FOOT (HCC): ICD-10-CM

## 2022-02-15 DIAGNOSIS — E11.42 DIABETIC POLYNEUROPATHY ASSOCIATED WITH TYPE 2 DIABETES MELLITUS (HCC): ICD-10-CM

## 2022-02-15 DIAGNOSIS — E11.9 TYPE 2 DIABETES MELLITUS WITHOUT COMPLICATION, WITHOUT LONG-TERM CURRENT USE OF INSULIN (HCC): Primary | ICD-10-CM

## 2022-02-15 DIAGNOSIS — I73.9 PERIPHERAL VASCULAR DISORDER (HCC): ICD-10-CM

## 2022-02-15 DIAGNOSIS — Z87.2 HISTORY OF SKIN ULCER: ICD-10-CM

## 2022-02-15 PROCEDURE — 99024 POSTOP FOLLOW-UP VISIT: CPT | Performed by: PODIATRIST

## 2022-02-15 NOTE — PROGRESS NOTES
[address] [facility] PODIATRY [facility]  [address] [place] [address]  Dept: [phone]  Dept Fax: [phone]    POST-OP PROGRESS NOTE  Date of patient's visit: 2/15/2022  Patient's Name:  Venus Neely YOB: 1965            Patient Care Team:  Sage Pryor MD as PCP - General (Family Medicine)  Hermilo Cheung MD as Consulting Physician (Infectious Diseases)  Mireya Lal MD as Consulting Physician (Infectious Diseases)  Brandon Villanueva RD, FRANCESCO as Dietitian        Chief Complaint   Patient presents with    Post-Op Check     rtc 2. 5 weeks - FOOT ABSCESS INCISION AND DRAINAGE  (PULSE LAVAGE, CULTURE SWABS)           Subjective: Venus Neely is a 49 y.o. female who presents to the office today 2. 5 week(s)  S/P rtc 2. 5 weeks - FOOT ABSCESS INCISION AND DRAINAGE  (PULSE LAVAGE, CULTURE SWABS) for correction of foot infection. Problem List Items Addressed This Visit     None      Visit Diagnoses     Type 2 diabetes mellitus without complication, without long-term current use of insulin ([address]    -  Primary    Relevant Medications    Misc. Devices MISC    mupirocin (BACTROBAN) 2 % ointment    History of skin ulcer        Relevant Medications    Misc. Devices MISC    mupirocin (BACTROBAN) 2 % ointment    Peripheral vascular disorder (HCC)        Relevant Medications    Misc. Devices MISC    mupirocin (BACTROBAN) 2 % ointment    History of amputation of lesser toe of left foot (HCC)        Relevant Medications    Misc. Devices MISC    mupirocin (BACTROBAN) 2 % ointment    Diabetic polyneuropathy associated with type 2 diabetes mellitus ([address]        Relevant Medications    Misc. Devices MISC    mupirocin (BACTROBAN) 2 % ointment      Patient relates pain is Present and stayed the same. Pain is rated 3 out of 10 and is described as constant, mild, moderate. Currently denies F/C/N/V.   Is patient taking pain medications as prescribed and is controlling pain yes    Physical Examination:  Incision is coapted, sutures/steri-strips are intact. Minimal bleeding post operatively. Edema present. No erythema. No Pus. Operative correction is satisfactory. Assessment: Silver Springer is status post as above  Normal post operative course. Doing well          ICD-10-CM    1. Type 2 diabetes mellitus without complication, without long-term current use of insulin ([facility])  E11.9 Misc. Devices MISC     mupirocin (BACTROBAN) 2 % ointment   2. History of skin ulcer  Z87.2 Misc. Devices MISC     mupirocin (BACTROBAN) 2 % ointment   3. Peripheral vascular disorder ([facility])  I73.9 Misc. Devices MISC     mupirocin (BACTROBAN) 2 % ointment   4. History of amputation of lesser toe of left foot ([facility]  Z89.422 Misc. Devices MISC     mupirocin (BACTROBAN) 2 % ointment   5. Diabetic polyneuropathy associated with type 2 diabetes mellitus ([facility]  E11.42 Misc. Devices MISC     mupirocin (BACTROBAN) 2 % ointment         Plan:  Patient examined and evaluated. Current condition and treatment options discussed in detail. Advised pt to her ondition. Pt is needeing to stay off of her foot in a wheeled walker with a seat. Patient was fitted in their diabetic shoes and inserts today. I feel that these appliances are medically necessary for my patients well being and in my opinion are both reasonable and necessary to the accepted medical practice in the treatment of the above conditions. Diabetic  shoes prevent the frictional forces on the feet and  from the feet being overloaded and decrease plantar presssure to the forefoot Abnormal foot/leg functions demands mechanical, functional protections and control. Without the diabetic shoes and inserts, the patient may develop an ulcer to the forefoot. Later on in life, the patient may develop an ulcer which leads to osteomyelitis and infections.   These shoes accommodate the toe

## 2022-02-15 NOTE — PATIENT INSTRUCTIONS
Schedule a Vaccine  When you qualify to receive the vaccine, call the [facility]) COVID-19 Vaccination Hotline to schedule your appointment or to get additional information about the [facility]) locations which are offering the COVID-19 vaccine. To be 94% effective, it's important that you receive two doses of one of the COVID-19 vaccines. -If you are receiving the Lewis Peter vaccine, your second shot will be scheduled as close to 21 days after the first shot as possible. -If you are receiving the Moderna vaccine, your second shot will be scheduled as close to 28 days after the first shot as possible. [facility]) COVID-19 Vaccination Hotline: [phone]    Links to [facility]) website and [facility] website:    [employer]/[facility]-monitoring-coronavirus-covid-19/covid-19-vaccine/[place]/benavidez-vaccine    [email or web address]

## 2022-02-22 ENCOUNTER — TELEPHONE (OUTPATIENT)
Dept: INFECTIOUS DISEASES | Age: 57
End: 2022-02-22

## 2022-02-22 NOTE — TELEPHONE ENCOUNTER
Midline came out Saturday and facility doc ordered IM 2mg of Rochephin X 2 days. She wants to know if patient needs more ABX? Patient is due to be discharged today. Patient has an appointment 2-24 with you.

## 2022-02-22 NOTE — TELEPHONE ENCOUNTER
Connie called back and was informed of Dr. Cornell Vazquez message.   Connie confirmed appt for pt on 2/24/22

## 2022-02-22 NOTE — TELEPHONE ENCOUNTER
I need to see her 2/24 to decide if Iv antib  still needed. They should not yet plan the DC from rehab till I see her.

## 2022-02-23 ENCOUNTER — CARE COORDINATION (OUTPATIENT)
Dept: CARE COORDINATION | Age: 57
End: 2022-02-23

## 2022-02-23 NOTE — CARE COORDINATION
Patient in inpatient facility- discharged from [address] [place] [address]- will remove from panel.

## 2022-02-24 ENCOUNTER — OFFICE VISIT (OUTPATIENT)
Dept: INFECTIOUS DISEASES | Age: 57
End: 2022-02-24
Payer: COMMERCIAL

## 2022-02-24 VITALS
OXYGEN SATURATION: 98 % | SYSTOLIC BLOOD PRESSURE: 128 MMHG | TEMPERATURE: 96.1 F | HEART RATE: 83 BPM | RESPIRATION RATE: 16 BRPM | DIASTOLIC BLOOD PRESSURE: 74 MMHG | HEIGHT: 66 IN | BODY MASS INDEX: 39.53 KG/M2 | WEIGHT: 246 LBS

## 2022-02-24 DIAGNOSIS — E11.628 TYPE 2 DIABETES MELLITUS WITH RIGHT DIABETIC FOOT INFECTION (HCC): Primary | ICD-10-CM

## 2022-02-24 DIAGNOSIS — L08.9 TYPE 2 DIABETES MELLITUS WITH RIGHT DIABETIC FOOT INFECTION (HCC): Primary | ICD-10-CM

## 2022-02-24 PROCEDURE — 99214 OFFICE O/P EST MOD 30 MIN: CPT | Performed by: INTERNAL MEDICINE

## 2022-02-24 PROCEDURE — 3046F HEMOGLOBIN A1C LEVEL >9.0%: CPT | Performed by: INTERNAL MEDICINE

## 2022-02-24 PROCEDURE — G8427 DOCREV CUR MEDS BY ELIG CLIN: HCPCS | Performed by: INTERNAL MEDICINE

## 2022-02-24 PROCEDURE — G8484 FLU IMMUNIZE NO ADMIN: HCPCS | Performed by: INTERNAL MEDICINE

## 2022-02-24 PROCEDURE — 2022F DILAT RTA XM EVC RTNOPTHY: CPT | Performed by: INTERNAL MEDICINE

## 2022-02-24 PROCEDURE — G8417 CALC BMI ABV UP PARAM F/U: HCPCS | Performed by: INTERNAL MEDICINE

## 2022-02-24 PROCEDURE — 1111F DSCHRG MED/CURRENT MED MERGE: CPT | Performed by: INTERNAL MEDICINE

## 2022-02-24 PROCEDURE — 1036F TOBACCO NON-USER: CPT | Performed by: INTERNAL MEDICINE

## 2022-02-24 PROCEDURE — 3017F COLORECTAL CA SCREEN DOC REV: CPT | Performed by: INTERNAL MEDICINE

## 2022-02-24 RX ORDER — CEPHALEXIN 500 MG/1
500 CAPSULE ORAL 4 TIMES DAILY
Qty: 120 CAPSULE | Refills: 0 | Status: SHIPPED | OUTPATIENT
Start: 2022-02-24 | End: 2022-03-26

## 2022-02-24 ASSESSMENT — ENCOUNTER SYMPTOMS
EYE DISCHARGE: 0
COLOR CHANGE: 0
APNEA: 0
ABDOMINAL DISTENTION: 0

## 2022-02-24 NOTE — PROGRESS NOTES
[facility] - Initial Consult Note  Today's Date: 2/24/2022    Impression :   · Right second finger infected soft tissue then osteomyelitis  · R 2nd finger amputation and infection resolved fully - 10/27/21 no active hand infection  · Right hand soft tissue infection  · Diabetes on insulin  · SLE  Admission 2/8/22  · Right diabetic foot infection, GAS  · Cellulitis both feet w phlegmon in foot - no abscess - very tender foot and sole wound mild drainage  · cx left foot MSSA R clinda  and GAS- finegoldia and aerococcus - MRI no OM  · Sent on invanz daily till 2/24 w good response  · Bacteriuria vs UTI kleb S ancef    Recommendations   · Post invanz daily till 2/24 w good response  · Place midline or a periph iv,   · Keep AB 5 more doses of invanz, till 2/28,  then send home on po keflex po x 30 days  · See me in 30 days w cbc crp creat       Diagnosis Orders   1. Type 2 diabetes mellitus with right diabetic foot infection (HCC)  CBC with Auto Differential    Creatinine    C-Reactive Protein    cephALEXin (KEFLEX) 500 MG capsule       Return in about 4 weeks (around 3/24/2022). History of Present Illness:   Ion Perez is a 49y.o.-year-old  female who presents with   Chief Complaint   Patient presents with    Frequent Infections     wound on right foot   · Right second finger infected soft tissue then osteomyelitis  · Right hand soft tissue infection  · Diabetes on insulin  · SLE    · Keep fluconazole po 1 more  Week  · daptomycin daily x 7 days start now  · Plan DC w outpt infusion visit daily x 7   1. Current pus cx MSSA and kleb S ceftriaoxne  2. Past pus cx MR SCN and candida glabrata  3. Pt cant tolerate po AB  · Ok for DC  FU office 2 weeks    Visit 10/27/21  Stopped the po AB at day 2 - but the hand wound is closed and non inflammed.   Exam neg - she feels good  Sutures are still in place    Visit 2/24/22  Admission 2/8/22  · Right diabetic foot infection, GAS  · Cellulitis both feet w phlegmon in foot - no abscess - very tender foot and sole wound mild drainage  · cx left foot MSSA R clinda  and GAS- finegoldia and aerococcus - MRI no OM  · Sent on invanz daily till 2/24 w good response  · Bacteriuria vs UTI kleb S ancef    Right foot looks great much better - swelling and induration and tenderness are much better. Not tender - sole wound is clean and healthy looking  Had the iv out x 3 days ansd got ceftriaxone im x 1 then no AB x 2 days    Plan-  Keep total AB 3 weeks iv till 2/28 the pull line and DC home on keflex 4 x per day x 30 days  This way we treat a suspected but no diagnosed osteomyelitis right foot  See me in a month      I have personally reviewed the past medical history, past surgical history, medications, social history, and family history, and I haveupdated the database accordingly.   Past Medical History:     Past Medical History:   Diagnosis Date    Acid reflux 5/29/2017    Acute cystitis with hematuria 10/11/2016    Acute non-recurrent maxillary sinusitis 11/28/2017    Asthma     Bipolar 1 disorder ([address] 1/4/2018    Bipolar disorder, mixed ([address]     BMI 34.0-34.9,adult 11/28/2017    Cannabis use disorder, severe, dependence ([address] 12/19/2017    Cerebrovascular accident (CVA) ([address] 6/14/2017    Chest pain 11/5/2016    Chronic renal insufficiency     Cocaine abuse ([address] 1/5/2018    COVID-19 virus RNA test result unknown     DDD (degenerative disc disease), cervical     Diabetes mellitus ([address]     Dizziness 6/13/2017    Fibromyalgia     History of stroke 9/9/2017    Homicidal ideation 11/6/2017    Hyperglycemia     Hypertension     Hypotension 1/18/2019    IDDM (insulin dependent diabetes mellitus) 12/21/2015    Lupus ([address]     Migraine     Neuropathy     Neuropathy     Polysubstance abuse ([address] 9/17/2017    Post traumatic stress disorder (PTSD)     Posttraumatic stress disorder 5/29/2017    Recurrent depression ([address] 5/29/2017    Recurrent major depressive disorder, in partial remission ([facility] 11/28/2017    Screening mammogram, encounter for 11/28/2017    Severe recurrent major depression with psychotic features ([facility] 12/19/2017    Severe recurrent major depression without psychotic features ([address] 12/19/2017    Stroke (cerebrum) ([facility] 6/14/2017    Stroke ([facility]     per chart notes    Suicidal ideation     Suicidal intent 3/10/2017    Vitamin D deficiency 11/28/2017    White matter changes 6/13/2017       Past Surgical  History:     Past Surgical History:   Procedure Laterality Date    ABDOMEN SURGERY      drain tube    ABSCESS DRAINAGE      right buttock    CATARACT REMOVAL WITH IMPLANT Bilateral     CHEST TUBE INSERTION      FINGER AMPUTATION Left 03/13/2021    LEFT RING FINER AMPUTATION performed by Niyah Hair MD at [facility] Right 10/11/2021    AMPUTATION RIGHT INDEX FINGER performed by Niyah Hair MD at [address] [place] [address] Right 1/27/2022    FOOT ABSCESS INCISION AND DRAINAGE  (PULSE LAVAGE, CULTURE SWABS) performed by Dc Teran DPM at [address] [place] [address] Right 01/27/2022    FOOT ABSCESS INCISION AND DRAINAGE (PULSE LAVAGE, CULTURE SWABS) - Right    HAND SURGERY Right 09/14/2021    I&D INDEX FINGER performed by Niyah Hair MD at [address] [place] [address] Right 09/15/2021    I&D INDEX FINGER performed by Niyah Hair MD at [place] [address]  2/3/2022         LASIK Bilateral        Medications:     Current Outpatient Medications:     mupirocin (BACTROBAN) 2 % ointment, Apply topically 3 times daily Apply topically to right foot wound two times a day for wound care. Apply to right foot wound, cover with Kerlix and ace wrap., Disp: , Rfl:     cephALEXin (KEFLEX) 500 MG capsule, Take 1 capsule by mouth 4 times daily, Disp: 120 capsule, Rfl: 0    Misc.  Devices MISC, 1 PAIR OF DIABETIC SHOES (1 LEFT/ 1 RIGHT) 1-3 PAIRS OF INSERTS (LEFT/ RIGHT), Disp: 2 each, Rfl: 0    pregabalin (LYRICA) 200 MG capsule, Take 1 capsule by mouth 2 times daily for 30 days. , Disp: 60 capsule, Rfl: 0    insulin glargine (LANTUS SOLOSTAR) 100 UNIT/ML injection pen, Inject 30 Units into the skin 2 times daily, Disp: 5 pen, Rfl: 5    fluticasone (FLONASE) 50 MCG/ACT nasal spray, 1 spray by Each Nostril route daily, Disp: 32 g, Rfl: 1    acetaminophen (TYLENOL) 500 MG tablet, Take 2 tablets by mouth 3 times daily as needed for Pain, Disp: 180 tablet, Rfl: 0    dicyclomine (BENTYL) 10 MG capsule, Take 1 capsule by mouth 4 times daily, Disp: 120 capsule, Rfl: 3    metFORMIN (GLUCOPHAGE) 1000 MG tablet, Take 1 tablet by mouth 2 times daily (with meals), Disp: 60 tablet, Rfl: 3    mirtazapine (REMERON) 7.5 MG tablet, Take 1 tablet by mouth nightly, Disp: 30 tablet, Rfl: 3    pantoprazole (PROTONIX) 20 MG tablet, Take 1 tablet by mouth 2 times daily (before meals), Disp: 30 tablet, Rfl: 3    traZODone (DESYREL) 150 MG tablet, Take 1 tablet by mouth nightly, Disp: 30 tablet, Rfl: 3    venlafaxine (EFFEXOR XR) 150 MG extended release capsule, Take 1 capsule by mouth daily (with breakfast), Disp: 30 capsule, Rfl: 3    Lancets MISC, 1 each by Does not apply route 3 times daily, Disp: 200 each, Rfl: 5    Alcohol Swabs 70 % PADS, Use 1 swab 3 times daily as needed, Disp: 100 each, Rfl: 5    blood glucose monitor strips, Test 3 times a day & as needed for symptoms of irregular blood glucose. Dispense sufficient amount for indicated testing frequency plus additional to accommodate PRN testing needs. , Disp: 100 strip, Rfl: 0    Lancets MISC, 1 each by Does not apply route 3 times daily, Disp: 200 each, Rfl: 0    Insulin Pen Needle ([employer] PEN NEEDLES 31G) 31G X 8 MM MISC, 1 each by Does not apply route daily, Disp: 100 each, Rfl: 3    FREESTYLE LITE strip, 1 each by Does not apply route 3 times daily, Disp: 100 each, Rfl: 5    FLOVENT  MCG/ACT inhaler, Inhale 2 puffs into the lungs 2 times daily, Disp: 1 Inhaler, Rfl: 3    budesonide-formoterol (SYMBICORT) 80-4.5 MCG/ACT AERO, Inhale 2 puffs into the lungs 2 times daily, Disp: 1 Inhaler, Rfl: 5    ibuprofen (ADVIL;MOTRIN) 800 MG tablet, Take 1 tablet by mouth every 8 hours as needed for Pain, Disp: 42 tablet, Rfl: 1    albuterol sulfate  (90 Base) MCG/ACT inhaler, Inhale 2 puffs into the lungs every 4 hours as needed for Wheezing, Disp: 1 Inhaler, Rfl: 5      Social History:     Social History     Socioeconomic History    Marital status: Legally      Spouse name: Not on file    Number of children: Not on file    Years of education: Not on file    Highest education level: Not on file   Occupational History    Not on file   Tobacco Use    Smoking status: Never Smoker    Smokeless tobacco: Never Used   Vaping Use    Vaping Use: Never used   Substance and Sexual Activity    Alcohol use: Not Currently    Drug use: Yes     Types: Marijuana ([place]), Cocaine     Comment: + Cocaine 2/2021 also, see Care Everywhere UDS    Sexual activity: Not Currently     Partners: Male   Other Topics Concern    Not on file   Social History Narrative    Not on file     Social Determinants of Health     Financial Resource Strain: High Risk    Difficulty of Paying Living Expenses: Hard   Food Insecurity: Food Insecurity Present    Worried About Running Out of Food in the Last Year: Often true    Kelin of Food in the Last Year: Often true   Transportation Needs: Unmet Transportation Needs    Lack of Transportation (Medical):  Yes    Lack of Transportation (Non-Medical): Yes   Physical Activity: Inactive    Days of Exercise per Week: 0 days    Minutes of Exercise per Session: 0 min   Stress: Stress Concern Present    Feeling of Stress : Rather much   Social Connections:     Frequency of Communication with Friends and Family: Not on file    Frequency of Social Gatherings with Friends and Family: Not on file    Attends [employer] Services: Not on file    Active Member of Clubs or Organizations: Not on file    Attends Club or Organization Meetings: Not on file    Marital Status: Not on file   Intimate Partner Violence:     Fear of Current or Ex-Partner: Not on file    Emotionally Abused: Not on file    Physically Abused: Not on file    Sexually Abused: Not on file   Housing Stability: High Risk    Unable to Pay for Housing in the Last Year: Yes    Number of Places Lived in the Last Year: 2    Unstable Housing in the Last Year: Yes       Family History:     Family History   Problem Relation Age of Onset    Diabetes Mother     Stroke Mother     Diabetes Father     Diabetes Sister     Diabetes Brother     Other Son         GSW    No Known Problems Sister         Allergies:   Bactrim [sulfamethoxazole-trimethoprim] and Adhesive tape     Review of Systems:   Review of Systems   Constitutional: Negative for activity change. HENT: Negative for congestion. Eyes: Negative for discharge. Respiratory: Negative for apnea. Cardiovascular: Negative for chest pain. Gastrointestinal: Negative for abdominal distention. Endocrine: Negative for cold intolerance, polydipsia and polyphagia. Genitourinary: Negative for dysuria. Musculoskeletal: Negative for arthralgias. Skin: Positive for wound. Negative for color change. Allergic/Immunologic: Negative for immunocompromised state. Neurological: Negative for dizziness. Hematological: Negative for adenopathy. Psychiatric/Behavioral: Negative for agitation. Physical Examination :   Blood pressure 128/74, pulse 83, temperature 96.1 °F (35.6 °C), resp. rate 16, height 5' 6\" (1.676 m), weight 246 lb (111.6 kg), SpO2 98 %. Physical Exam  Constitutional:       General: She is not in acute distress. Appearance: Normal appearance. She is not ill-appearing. HENT:      Head: Normocephalic and atraumatic.       Nose: Nose normal.      Mouth/Throat:      Mouth: Mucous membranes are moist.   Eyes:      General: No scleral icterus. Conjunctiva/sclera: Conjunctivae normal.   Cardiovascular:      Rate and Rhythm: Normal rate and regular rhythm. Heart sounds: Normal heart sounds. No murmur heard. Pulmonary:      Effort: No respiratory distress. Breath sounds: Normal breath sounds. Abdominal:      General: There is no distension. Palpations: Abdomen is soft. Genitourinary:     Comments: No cullen  Musculoskeletal:         General: No swelling or deformity. Cervical back: Neck supple. No rigidity or tenderness. Skin:     General: Skin is dry. Coloration: Skin is not jaundiced. Neurological:      General: No focal deficit present. Mental Status: She is alert and oriented to person, place, and time. Psychiatric:         Mood and Affect: Mood normal.         Thought Content:  Thought content normal.           Medical Decision Making:   I have independently reviewed/ordered the following labs:    CBCwith Differential:   Lab Results   Component Value Date    WBC 6.4 02/08/2022    WBC 6.0 02/07/2022    HGB 7.5 02/08/2022    HGB 7.3 02/07/2022    HCT 24.8 02/08/2022    HCT 24.9 02/07/2022     02/08/2022     02/07/2022    LYMPHOPCT 27 02/08/2022    LYMPHOPCT 49 02/07/2022    MONOPCT 8 02/08/2022    MONOPCT 12 02/07/2022     BMP:  Lab Results   Component Value Date     02/08/2022     02/07/2022    K 4.4 02/08/2022    K 4.6 02/07/2022     02/08/2022     02/07/2022    CO2 22 02/08/2022    CO2 20 02/07/2022    BUN 15 02/08/2022    BUN 21 02/07/2022    CREATININE 0.80 02/08/2022    CREATININE 0.97 02/07/2022    MG 2.0 11/04/2021    MG 1.8 09/15/2021     Hepatic Function Panel:   Lab Results   Component Value Date    PROT 7.2 01/22/2022    PROT 7.8 10/08/2021    LABALBU 4.0 01/22/2022    LABALBU 3.6 10/08/2021    BILIDIR <0.08 10/08/2021    BILIDIR <0.08 09/22/2021 IBILI CANNOT BE CALCULATED 10/08/2021    IBILI CANNOT BE CALCULATED 09/22/2021    BILITOT <0.10 01/22/2022    BILITOT <0.10 10/08/2021    ALKPHOS 136 01/22/2022    ALKPHOS 110 10/08/2021    ALT 14 01/22/2022    ALT 11 10/08/2021    AST 14 01/22/2022    AST 16 10/08/2021     No results found for: RPR  No results found for: HIV  No results found for: [facility]  Lab Results   Component Value Date    MUCUS NOT REPORTED 01/24/2022    RBC 2.40 02/08/2022    TRICHOMONAS NOT REPORTED 01/24/2022    WBC 6.4 02/08/2022    YEAST NOT REPORTED 01/24/2022    TURBIDITY Clear 01/24/2022     Lab Results   Component Value Date    CREATININE 0.80 02/08/2022    GLUCOSE 132 02/08/2022   you for allowing us to participate in the care of this patient. Please call with questions. Suzanne Archibald MD  - Office: [phone]    Please note that this chart was generated using voice recognition Dragon dictation software. Although every effort was made to ensure the accuracy of this automated transcription, some errors in transcription mayhave occurred.

## 2022-02-25 ENCOUNTER — TELEPHONE (OUTPATIENT)
Dept: INFECTIOUS DISEASES | Age: 57
End: 2022-02-25

## 2022-02-25 ENCOUNTER — HOSPITAL ENCOUNTER (OUTPATIENT)
Dept: INTERVENTIONAL RADIOLOGY/VASCULAR | Age: 57
Discharge: HOME | End: 2022-02-27
Payer: COMMERCIAL

## 2022-02-25 DIAGNOSIS — B99.9 INFECTION: ICD-10-CM

## 2022-02-25 PROCEDURE — C1769 GUIDE WIRE: HCPCS

## 2022-02-25 PROCEDURE — 76937 US GUIDE VASCULAR ACCESS: CPT

## 2022-02-25 PROCEDURE — 2709999900 HC NON-CHARGEABLE SUPPLY

## 2022-02-25 PROCEDURE — 36410 VNPNXR 3YR/> PHY/QHP DX/THER: CPT

## 2022-02-25 PROCEDURE — 76000 FLUOROSCOPY <1 HR PHYS/QHP: CPT

## 2022-02-25 PROCEDURE — C1751 CATH, INF, PER/CENT/MIDLINE: HCPCS

## 2022-02-25 NOTE — TELEPHONE ENCOUNTER
Kirsten Duggan MD  Forwarded 2/25/2022 1:18 PM  [facility] - 2/25/2022 1:17 PM  [phone] From: ptn RE: Joanna Hartley for dr Bipin Corona did not get a picc elijah as yet needs to get a call back the ptn is still in rehab and needs a picc line, was poked 5 times last night, needs to get scheduled to get a picc line Need Callback: NEEDS CALLBACK  Forwarded 2/25/2022 1:18 PM  2/25/2022 1:18 PM  Pls schedule midline ASAP  2/25/2022 1:23 PM  I'll take care of this.  Mikayla  Read 2/25/2022 1:23 PM

## 2022-02-25 NOTE — TELEPHONE ENCOUNTER
Sal Mckeon, called to report that pt refused midline yesterday. Per your plan you stated  Plan-  Keep total AB 3 weeks iv till 2/28 the pull line and DC home on keflex 4 x per day x 30 days  This way we treat a suspected but no diagnosed osteomyelitis right foot  See me in a month    Please advise if there is an oral ABX that she can take in place of the IV ABX. Please advise. Thank you.

## 2022-02-25 NOTE — TELEPHONE ENCOUNTER
Called the facility, spoke to Linda. Patient was poked 5 times and was unsuccessful. The same person came back to try again and the patient refused that person. They do have transportation available if we can secure an appt with the PICC team. Parisa Sutton has a message out to them in [address] [place] [address].

## 2022-02-25 NOTE — BRIEF OP NOTE
Brief Postoperative Note    Silver Springer  YOB: 1965  [id number]    Pre-operative Diagnosis: Osteomyelitis     Post-operative Diagnosis: Same    Procedure: Midline Placement    Anesthesia: Local      Surgeons/Assistants: Corby Luu PA-C     Estimated Blood Loss: Minimal    Complications: none    Specimens: were not obtained    Procedure: Midline Placement. 5 Fr x 20 single lumen Power Midlineplaced in right upper extremity. Dressings applied, Midlinesecured to skin. May use Midline.       Electronically signed by MARYLU Ballard on 2/25/2022 at 3:44 PM

## 2022-02-25 NOTE — TELEPHONE ENCOUNTER
Natalya, I'm going to take care of this. I have a message out to the PICC team checking their availability to place a mid-line today. The facility can provide transportation if so. I will follow up on this.

## 2022-02-28 ENCOUNTER — TELEPHONE (OUTPATIENT)
Dept: INFECTIOUS DISEASES | Age: 57
End: 2022-02-28

## 2022-02-28 NOTE — TELEPHONE ENCOUNTER
[facility] or Facility: [facility] From: galen RE: Cristóbal Danette 1965 RM: 214 ptn signed out of facility ama and pulled out picc line as a friday  Forwarded 2/28/2022 1:19 PM  2/28/2022 1:19 PM    Pls call Flaquita Horowitz Ask her to start the keflex I had called to her pharmacy x 30 days  2/28/2022 1:22 PM  Ok  Read 2/28/2022 1:23 PM  2/28/2022 1:23 PM  I   will take care of it. , Chary  Read 2/28/2022 1:23 PM  2/28/2022 1:24 PM  Ty      Spoke to patient and informed and she will start Keflex.

## 2022-03-01 ENCOUNTER — CARE COORDINATION (OUTPATIENT)
Dept: CARE COORDINATION | Age: 57
End: 2022-03-01

## 2022-03-01 NOTE — CARE COORDINATION
Patient had phoned and left a message for [facility], requesting a return call. HC phoned and left a message for patient to call back, patient called the  [facility] again stating that she needs transportation to a medical appointment  on Thurs, 03/03/22. HC contacted [facility] and arranged for   patient to travel to Dr. Joanna Rebolledo @ [address] [facility] [address]. Patient's appointment is  @ 9:15a, she can be picked up as early as 7:30a. Trip # is [id number]. Patient has an appointment with Dr. Jarod Finn @ [facility] [address], [address] [address],[address], [address] [facility]. Patient's appointment  is Monday,03/28/22, @ 1:45p. Patient can be picked up as early as 12:00p. HC will send patient a text message and remind her to ready prior to the pick  up time, cab will only wait for 5 minutes. HC will also remind patient to wear   her mask, and call for her return ride home. Plan of Care  [facility] will follow up with patient late 03/02/22 for a reminder for transportation   .

## 2022-03-02 ENCOUNTER — CARE COORDINATION (OUTPATIENT)
Dept: CARE COORDINATION | Age: 57
End: 2022-03-02

## 2022-03-03 DIAGNOSIS — G62.9 NEUROPATHY: ICD-10-CM

## 2022-03-03 DIAGNOSIS — R51.9 INTRACTABLE HEADACHE, UNSPECIFIED CHRONICITY PATTERN, UNSPECIFIED HEADACHE TYPE: ICD-10-CM

## 2022-03-03 RX ORDER — PREGABALIN 200 MG/1
200 CAPSULE ORAL 2 TIMES DAILY
Qty: 60 CAPSULE | Refills: 0 | Status: SHIPPED | OUTPATIENT
Start: 2022-03-03 | End: 2022-03-08 | Stop reason: SDUPTHER

## 2022-03-03 RX ORDER — ACETAMINOPHEN 500 MG
1000 TABLET ORAL 3 TIMES DAILY PRN
Qty: 180 TABLET | Refills: 0 | Status: SHIPPED | OUTPATIENT
Start: 2022-03-03 | End: 2022-03-15 | Stop reason: SDUPTHER

## 2022-03-03 NOTE — TELEPHONE ENCOUNTER
Please address the medication refill and close the encounter. If I can be of assistance, please route to the applicable pool. Thank you. Last visit: 1-20-22  Last Med refill: 12/22/21 acetaminophen    2-23-22 pregabalin  Does patient have enough medication for 72 hours: No:     Next Visit Date:  Future Appointments   Date Time Provider Jj Hernandez   3/22/2022  2:00 PM Malinda Hernandez [facility]   3/28/2022  1:45 PM Teresa Gomez MD [facility] Maintenance   Topic Date Due    Pneumococcal 0-64 years Vaccine (1 of 2 - PPSV23) Never done    Depression Monitoring  Never done    Diabetic retinal exam  Never done    Hepatitis B vaccine (1 of 3 - Risk 3-dose series) Never done    Breast cancer screen  Never done    Shingles Vaccine (1 of 2) Never done    Colorectal Cancer Screen  08/14/2018    Flu vaccine (1) Never done    Diabetic microalbuminuria test  10/20/2021    Lipid screen  12/30/2021    A1C test (Diabetic or Prediabetic)  04/20/2022    COVID-19 Vaccine (3 - Booster for Pfizer series) 05/22/2022    Diabetic foot exam  01/22/2023    Cervical cancer screen  12/22/2026    DTaP/Tdap/Td vaccine (5 - Td or Tdap) 01/22/2032    Hepatitis C screen  Completed    HIV screen  Completed    Hepatitis A vaccine  Aged Out    Hib vaccine  Aged Out    Meningococcal (ACWY) vaccine  Aged Out       Hemoglobin A1C (%)   Date Value   01/20/2022 10.0   11/04/2021 11.9 (H)   09/16/2021 14.2 (H)             ( goal A1C is < 7)   Microalb/Crt.  Ratio (mcg/mg creat)   Date Value   10/20/2020 20     LDL Cholesterol (mg/dL)   Date Value   12/30/2020 146 (H)   12/18/2019 100       (goal LDL is <100)   AST (U/L)   Date Value   01/22/2022 14     ALT (U/L)   Date Value   01/22/2022 14     BUN (mg/dL)   Date Value   02/08/2022 15     BP Readings from Last 3 Encounters:   02/24/22 128/74   02/08/22 136/87   01/27/22 102/79          (goal 120/80)    All Future Testing planned in [facility]  Lab Frequency Next Occurrence   Cologuard (For External Results Only) Once 07/12/2022   ARMAND Digital Screen Bilateral [[id number]] Once 08/09/2022   Lipid Panel Once 01/20/2023   XR FOOT RIGHT (MIN 3 VIEWS) Once 01/20/2023   CBC with Auto Differential Once 03/24/2022   Creatinine Once 03/24/2022   C-Reactive Protein Once 03/24/2022               Patient Active Problem List:     IDDM (insulin dependent diabetes mellitus)     Lupus ([facility])     Post traumatic stress disorder (PTSD)     Acute cystitis with hematuria     Hyperglycemia     Suicidal ideation     Arthritis     Chronic back pain     Acid reflux     History of stroke     Polysubstance abuse ([facility])     Cocaine abuse ([address]     Chest pain     Acute non-recurrent maxillary sinusitis     Acute respiratory failure with hypercapnia ([facility])     Alcohol use disorder, severe, dependence ([facility])     Asthma exacerbation attacks     Bilateral edema of lower extremity     Bipolar 1 disorder, depressed, severe ([facility])     BMI 34.0-34.9,adult     Cannabis use disorder, severe, dependence ([facility])     Cocaine use disorder, severe, dependence ([address]     Dizziness     Dyslipidemia     Family history of colon cancer     History of lupus     Homicidal ideation     Hypotension     Neuropathy     Normocytic anemia     Recurrent depression (HCC)     Recurrent major depressive disorder, in partial remission (HCC)     Severe recurrent major depression with psychotic features ([address]     Severe recurrent major depression without psychotic features ([address]     Upper GI bleed     Vitamin D deficiency     White matter changes     Gastroesophageal reflux disease     Stroke (cerebrum) ([address]     Rib lesion     Diabetes mellitus type 2 in obese (HCC)     YAEL (generalized anxiety disorder)     Posttraumatic stress disorder     Suicidal intent     Leg weakness, bilateral     Poorly controlled diabetes mellitus ([facility])     Felon of finger     Osteomyelitis ([facility])     Recurrent falls     Seasonal allergic rhinitis     Fibromyalgia     Tenosynovitis of finger     Open wound of right index finger     Adverse effect of COVID-19 vaccine     Wound dehiscence     Amputation finger     Abscess of right index finger     Type 2 diabetes mellitus without complication, with long-term current use of insulin ([facility])     Major depression     Right foot infection     Cellulitis and abscess of toe of right foot     Abscess of right foot     Bilateral cellulitis of lower leg related to related to  uncontrolled diabetes     Bipolar disorder, current episode mixed, unspecified ([facility])     Right foot ulcer, with fat layer exposed ([address]     Ulcer of left foot, with fat layer exposed ([address]     CRP elevated     Status post incision and drainage

## 2022-03-04 ENCOUNTER — CARE COORDINATION (OUTPATIENT)
Dept: CARE COORDINATION | Age: 57
End: 2022-03-04

## 2022-03-04 NOTE — CARE COORDINATION
Patient had attempted to contact [facility]., was unable to answer. HC returned patient's call, there was no answer. HC left a   message for patient and will try to contact later today.     Plan of Care  [facility] will attempt to reach out to patient

## 2022-03-05 NOTE — CARE COORDINATION
[facility]. phoned patient back to inquire of her needs. Patient stated that  She is feeling better today physically, however she found herself   feeling lonely and distracted. Patient stated that asked her daughter  to take her to the grocery store and her daughter isn't interested in  going. Patient reports that she hopes to find someone to take her   to the store this weekend. [facility]. encouraged patient while speaking of her loneliness and stated that her daughter might feel more like going to the store in the next day or so. Patient stated that she  will remain hopeful.     Plan of [facility], [facility].   HC will check in on patient next week regarding food resources and transportation

## 2022-03-07 ENCOUNTER — CARE COORDINATION (OUTPATIENT)
Dept: CARE COORDINATION | Age: 57
End: 2022-03-07

## 2022-03-07 DIAGNOSIS — G62.9 NEUROPATHY: ICD-10-CM

## 2022-03-07 NOTE — TELEPHONE ENCOUNTER
E-scribe request for med refill. Please review and e-scribe if applicable. Last Visit Date:  01/20/2022  Next Visit Date:  Visit date not found    Hemoglobin A1C (%)   Date Value   01/20/2022 10.0   11/04/2021 11.9 (H)   09/16/2021 14.2 (H)             ( goal A1C is < 7)   Microalb/Crt.  Ratio (mcg/mg creat)   Date Value   10/20/2020 20     LDL Cholesterol (mg/dL)   Date Value   12/30/2020 146 (H)       (goal LDL is <100)   AST (U/L)   Date Value   01/22/2022 14     ALT (U/L)   Date Value   01/22/2022 14     BUN (mg/dL)   Date Value   02/08/2022 15     BP Readings from Last 3 Encounters:   02/24/22 128/74   02/08/22 136/87   01/27/22 102/79          (goal 120/80)        Patient Active Problem List:     IDDM (insulin dependent diabetes mellitus)     Lupus (HCC)     Post traumatic stress disorder (PTSD)     Acute cystitis with hematuria     Hyperglycemia     Suicidal ideation     Arthritis     Chronic back pain     Acid reflux     History of stroke     Polysubstance abuse (HCC)     Cocaine abuse (HCC)     Chest pain     Acute non-recurrent maxillary sinusitis     Acute respiratory failure with hypercapnia (HCC)     Alcohol use disorder, severe, dependence (HCC)     Asthma exacerbation attacks     Bilateral edema of lower extremity     Bipolar 1 disorder, depressed, severe (HCC)     BMI 34.0-34.9,adult     Cannabis use disorder, severe, dependence ([address]     Cocaine use disorder, severe, dependence ([address]     Dizziness     Dyslipidemia     Family history of colon cancer     History of lupus     Homicidal ideation     Hypotension     Neuropathy     Normocytic anemia     Recurrent depression (HCC)     Recurrent major depressive disorder, in partial remission (HCC)     Severe recurrent major depression with psychotic features ([address]     Severe recurrent major depression without psychotic features ([address]     Upper GI bleed     Vitamin D deficiency     White matter changes     Gastroesophageal reflux disease     Stroke (cerebrum) ([address]     Rib lesion     Diabetes mellitus type 2 in obese ([facility])     YAEL (generalized anxiety disorder)     Posttraumatic stress disorder     Suicidal intent     Leg weakness, bilateral     Poorly controlled diabetes mellitus ([facility])     Felon of finger     Osteomyelitis ([facility])     Recurrent falls     Seasonal allergic rhinitis     Fibromyalgia     Tenosynovitis of finger     Open wound of right index finger     Adverse effect of COVID-19 vaccine     Wound dehiscence     Amputation finger     Abscess of right index finger     Type 2 diabetes mellitus without complication, with long-term current use of insulin ([facility])     Major depression     Right foot infection     Cellulitis and abscess of toe of right foot     Abscess of right foot     Bilateral cellulitis of lower leg related to related to  uncontrolled diabetes     Bipolar disorder, current episode mixed, unspecified ([address]     Right foot ulcer, with fat layer exposed ([address]     Ulcer of left foot, with fat layer exposed ([address]     CRP elevated     Status post incision and drainage      ----Mozella Prior

## 2022-03-07 NOTE — CARE COORDINATION
Patient attempted calling the [facility]. earlier today, HC was unavailable, and sent a message that she would return the call. HC attempted to return the call and there was no answer. HC  left a message for patient.     Plan of Care  [facility]. will attempt to reach patient

## 2022-03-07 NOTE — CARE COORDINATION
Ambulatory Care Coordination Note  3/7/2022  CM Risk Score: 2  Charlson 10 Year Mortality Risk Score: 79%     ACC: Tamara Schwarz    Summary Note: Gloria Cortés is accepting care coordination again now that she is home from the SNF. She did not speak well of the facility that she was in for 2 weeks. She reports her daughter assisted her with some groceries. She reports her daughter is assisting with finding her another place to live. She reports she returned to the [facility]. She was instructed by the LSW at the [facility] to stay there until she received a court order to move out. She is expressing difficulty keeping up her apartment d/t her foot infection. She is asking if she qualifies for a HHA. She reports she needs a new rollator. She does not have an appointment with her PCP. CM stressed the importance of scheduling an appointment to be seen. She reports she will call to schedule after our phone conversation. Plan:  Pend an order for a rollator and route to PCP. Refer to BRENDA SMITH [facility] to see if Gloria Cortés qualifies for the [facility]. (done)  Refer to dietician again to review any further educational needs. (done)  F/U in 1 week. Diabetes Assessment    Medic Alert ID: No  Meal Planning: Avoidance of concentrated sweets   How often do you test your blood sugar?: Daily, Bedtime (Comment: fasting, PP)   Do you have barriers with adherence to non-pharmacologic self-management interventions?  (Nutrition/Exercise/Self-Monitoring): Yes   Have you ever had to go to the ED for symptoms of low blood sugar?: No       No patient-reported symptoms   Do you have hyperglycemia symptoms?: No   Do you have hypoglycemia symptoms?: No   Blood Sugar Monitoring Regimen: Morning Fasting, At Bedtime, 2 Hours Post Meal   Blood Sugar Trends: Fluctuating          Ambulatory Care Coordination Assessment    Care Coordination Protocol  Referral from Primary Care Provider: No  Week 1 - Initial Assessment     Do you have all of your prescriptions and are they filled?: Yes  Barriers to medication adherence: Forgets to take  Are you able to afford your medications?: Yes  How often do you have trouble taking your medications the way you have been told to take them?: Sometimes I take them as prescribed. Do you have Home O2 Therapy?: No      Ability to seek help/take action for Emergent Urgent situations i.e. fire, crime, inclement weather or health crisis. : Independent  Ability to ambulate to restroom: Independent  Ability handle personal hygeine needs (bathing/dressing/grooming): Needs Assistance  Ability to manage Medications: Independent  Ability to prepare Food Preparation: Needs Assistance  Ability to maintain home (clean home, laundry): Needs Assistance  Ability to drive and/or has transportation: Dependent  Ability to do shopping: Needs Assistance  Ability to manage finances: Independent  Is patient able to live independently?: Yes     Current Housing: Apartment        Per the Fall Risk Screening, did the patient have 2 or more falls or 1 fall with injury in the past year?: Yes  How often do you think you are about to fall and you do NOT fall? For example, you grab something to stabilize yourself or hold onto a wall/furniture?: Occasionally  Use of a Mobility Aid: Yes  Difficulty walking/impaired gait: Yes  Issues with feet or shoes like numbness, edema, shoes not fitting: Yes (Comment: wound to R foot)  Changes in vision, poor vision or poor lighting in environment: Yes (Comment: wears reading glasses)  Dizziness: Yes (Comment: occasionally)  Other Fall Risk: Yes  What other fall risks does the patient have?: occasionally uses cocaine last use 12.21.21.      Frequent urination at night?: Yes  Do you use rails/bars?: Yes  Do you have a non-slip tub mat?: No     Are you experiencing loss of meaning?: No  Are you experiencing loss of hope and peace?: No     Thinking about your patient's physical health needs, are there any symptoms or problems (risk indicators) you are unsure about that require further investigation?: Moderate to severe symptoms or problems that impact on daily life   Are the patients physical health problems impacting on their mental well-being?: Severe impact upon mental well-being and preventing engagement with usual activities   Are there any problems with your patients lifestyle behaviors (alcohol, drugs, diet, exercise) that are impacting on physical or mental well-being?: Moderate to severe impact on well-being, preventing enjoyment of usual activities   Do you have any other concerns about your patients mental well-being? How would you rate their severity and impact on the patient?: Moderate to severe problems that interfere with function   How would you rate their home environment in terms of safety and stability (including domestic violence, insecure housing, neighbor harassment)?: Safety/stability questionable   How do daily activities impact on the patient's well-being? (include current or anticipated unemployment, work, caregiving, access to transportation or other): Severe impact on poor mental well-being   How would you rate their social network (family, work, friends)?: Little participation, lonely and socially isolated   How would you rate their financial resources (including ability to afford all required medical care)?: [address] insecure, some resource challenges   How wells does the patient now understand their health and well-being (symptoms, signs or risk factors) and what they need to do to manage their health?: Little understanding which impacts on their ability to undertake better management   How well do you think your patient can engage in healthcare discussions? (Barriers include language, deafness, aphasia, alcohol or drug problems, learning difficulties, concentration):  Adequate communication, with or without minor barriers   Do other services need to be involved to help this patient?: Other care/services not in place and required Are current services involved with this patient well-coordinated? (Include coordination with other services you are now recommendation): Required care/services missing and/or fragmented   Suggested Interventions and Community Resources   Home Care Waiver: In Process   Registered Dietician: Completed   Senior Services: Not Started   Social Work: Completed   Transportation Services: Completed                  Prior to Admission medications    Medication Sig Start Date End Date Taking? Authorizing Provider   pregabalin (LYRICA) 200 MG capsule Take 1 capsule by mouth 2 times daily for 30 days.  3/3/22 4/2/22 Yes Jose Maceod MD   acetaminophen (TYLENOL) 500 MG tablet Take 2 tablets by mouth 3 times daily as needed for Pain 3/3/22  Yes Jose Macedo MD   cephALEXin (KEFLEX) 500 MG capsule Take 1 capsule by mouth 4 times daily 2/24/22 3/26/22 Yes Alexus Arguelles MD   insulin glargine (LANTUS SOLOSTAR) 100 UNIT/ML injection pen Inject 30 Units into the skin 2 times daily 1/20/22  Yes Oniel Klein MD   fluticasone (FLONASE) 50 MCG/ACT nasal spray 1 spray by Each Nostril route daily 12/22/21  Yes Barbara Christensen MD   dicyclomine (BENTYL) 10 MG capsule Take 1 capsule by mouth 4 times daily 12/17/21  Yes Barbara Christensen MD   metFORMIN (GLUCOPHAGE) 1000 MG tablet Take 1 tablet by mouth 2 times daily (with meals) 12/17/21  Yes Barbara Christensen MD   mirtazapine (REMERON) 7.5 MG tablet Take 1 tablet by mouth nightly 12/17/21  Yes Barbara Christensen MD   pantoprazole (PROTONIX) 20 MG tablet Take 1 tablet by mouth 2 times daily (before meals) 12/17/21  Yes Barbara Christensen MD   traZODone (DESYREL) 150 MG tablet Take 1 tablet by mouth nightly 12/17/21  Yes Barbara Christensen MD   venlafaxine (EFFEXOR XR) 150 MG extended release capsule Take 1 capsule by mouth daily (with breakfast) 12/17/21  Yes Barbara Christensen MD   Alcohol Swabs 70 % PADS Use 1 swab 3 times daily as needed 11/15/21  Yes Ethel Quevedo MD   blood glucose monitor strips Test 3 times a day & as needed for symptoms of irregular blood glucose. Dispense sufficient amount for indicated testing frequency plus additional to accommodate PRN testing needs. 11/8/21  Yes Sara Shore MD   Lancets MISC 1 each by Does not apply route 3 times daily 11/8/21  Yes Sara Shore MD   Insulin Pen Needle ([employer] PEN NEEDLES 31G) 31G X 8 MM MISC 1 each by Does not apply route daily 11/8/21  Yes Harjit Viera MD   FREESTYLE LITE strip 1 each by Does not apply route 3 times daily 11/11/20  Yes Ca Bolanos MD   FLOVENT  MCG/ACT inhaler Inhale 2 puffs into the lungs 2 times daily 10/20/20  Yes Ca Bolanos MD   budesonide-formoterol (SYMBICORT) 80-4.5 MCG/ACT AERO Inhale 2 puffs into the lungs 2 times daily 10/20/20  Yes Ca Bolanos MD   mupirocin (BACTROBAN) 2 % ointment Apply topically 3 times daily Apply topically to right foot wound two times a day for wound care. Apply to right foot wound, cover with Kerlix and ace wrap. Patient not taking: Reported on 3/7/2022    Historical Provider, MD   Misc.  Devices MISC 1 PAIR OF DIABETIC SHOES (1 LEFT/ 1 RIGHT)  1-3 PAIRS OF INSERTS (LEFT/ RIGHT) 2/15/22   Yolette Pedersen DPM   ibuprofen (ADVIL;MOTRIN) 800 MG tablet Take 1 tablet by mouth every 8 hours as needed for Pain 11/15/21 11/29/21  Wendy Mei MD   albuterol sulfate  (90 Base) MCG/ACT inhaler Inhale 2 puffs into the lungs every 4 hours as needed for Wheezing 10/20/20 9/22/21  Ca Bolanos MD       Future Appointments   Date Time Provider Jj Hernandez   3/22/2022  2:00 PM Malinda Melara Podiatry [facility]   3/28/2022  1:45 PM Maxim Moran MD INFT DISEASE [facility]

## 2022-03-08 ENCOUNTER — CARE COORDINATION (OUTPATIENT)
Dept: CARE COORDINATION | Age: 57
End: 2022-03-08

## 2022-03-08 RX ORDER — PREGABALIN 200 MG/1
200 CAPSULE ORAL 2 TIMES DAILY
Qty: 60 CAPSULE | Refills: 0 | Status: SHIPPED | OUTPATIENT
Start: 2022-03-08 | End: 2022-03-15 | Stop reason: SDUPTHER

## 2022-03-08 NOTE — CARE COORDINATION
Nutrition Care Coordinator Follow-Up visit:    Food Recall: eating 2-3 meals/d    Activity Level:  Sedentary: X  Lightly Active: Moderately Active:  Very Active:    Adult BMI:  Underweight (below 18.5)  Normal Weight (18.5-24.9)  Overweight (25-29. 9)  Obese (30-39. 9) X  Morbidly Obese (>40)    Weight Change: weight gain  Patient states she has gained 25# over the past 2-3 months  Attributes weight gain to hospital/SNF stay     Plan:  Plan was established with patient:  Increase dietary fiber by consuming whole grains, fruits and vegetables:X  Limit dietary cholesterol to >200mg/day: Increase water intake:  Avoid added sugar: X  Avoid sweetened beverages: X  Choose lean meats: X      Monitoring: Will monitor weight:  Will monitor adherence to meal plan: Will monitor adherence to exercise plan: Will monitor HGA1c: X    Handouts Provided :  Low Carb snacking: X  Carb counting /individual meal plan: X  Portion Control: X  Food Labels: x  Physical Activity:  Low Fat/Cholesterol:  Hypo/Hyperglycemia: X  Calorie Controlled Meal Plan:    Goals: Increase water consumption to 8oz. 6-8 times daily:  Manage blood sugars by consuming 3 meals spaced every 4-5 hours with 2-3 snacks daily:discussed  Increase fiber and decrease fat intake by consuming 1-2 fruit servings and 2-3 vegetable servings per day.   Increase physical activity by:  Consume less than 2,000mg of sodium/day  Avoid consumption of sweetened beverages and added sugar by reading food labels:discussed  Monitor blood sugars by using meter to check blood glucose before morning meal and 2 hours after a meal daily:BS have been running low per patient- 52's, 65 recently- discussed prevention and treatment of hypoglycemia  Decrease risk of coronary heart disease by consuming fish that contains omega-3 fatty acids at least twice a week, avoiding partially hydrogenated oil/trans fats and limiting saturated fat intake by reading food labels:    Patient goals set:  1. Reviewed importance of meal pattern, patient relays she does eat 2-3 meals/day-still skipping breakfast  at times.  Quick/easy breakfast suggestions discussed, will send patient a list of suggestions. Patient states she does not sleep well some nights so meal times are off- encouraged to work on sticking with a meal schedule as mush as possible. Goal is 3 meals daily spaced every 4-5 hours. 2. Reviewed what foods contain carbohydrate to limit and how to measure out portions. Encouraged patient to limit carb intake to 60gms/meal, 15gms/snack. We focused on what a serving is, patient states she know she is over eating at times, loves potatoes, rice and cereal- encouraged to measure servings out using a measuring cup. Patient admits snacks often in the evening, discussed low carb snacking. 3. Reviewed plate method, encouraged patient to increase intake of non-starchy vegetables.  Goal is 1/2 of  plate to be non-starchy vegetables, 1/4 lean protein, 1/4 carbs. Foods from each category reviewed along with what a serving size is.   4. Reviewed avoiding/limiting sweets and sweetened drinks. Patient admits she was still drinking Pepsi and orange juice daily. Discussed alternatives and how sugary drinks can elevate blood sugars. Encouraged water and zero calorie beverages. Patient states she was eating whatever she wanted and not moving while in hospital and SNF last few months and gained 25 pounds. She states recently her sugars have been <70 several times and she stopped her lantus due to low sugars. She is trying to make a PCP appointment- awaiting return call from office. We discussed in detail prevention and treatment of hypoglycemia. Patient can't find diabetes nutrition information I sent her- will resend information. Will follow up in 3-4 weeks to review and answer questions.     Carolina Angelo

## 2022-03-08 NOTE — CARE COORDINATION
Incoming message from ACM, Sara Marshall, patient recently  Discharged from SNF and believes patient would benefit   From more nutrition education. Attempted to reach patient, LVM. Will attempt to reach  Patient again within 1-2 weeks.   FRANCESCO Mariee

## 2022-03-09 ENCOUNTER — CARE COORDINATION (OUTPATIENT)
Dept: CARE COORDINATION | Age: 57
End: 2022-03-09
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
## 2022-03-09 NOTE — CARE COORDINATION
HC received information from [facility]/Mikayla Cordero who stated that  this patient would benefit from [employer] and possibly  needing to be on the waiver program.  [facility]. attempted to contact this patient, there was no answer. HC left a message for patient and provided contact information for a return call.     Plan of Care  [facility] will reach out to patient if no response

## 2022-03-09 NOTE — CARE COORDINATION
HC reached out to patient again and found that she was enjoying coffee with a friend that stopped by. HC encouraged patient to enjoy her friend and stated that we would speak later. HC will review patient's appointments and make sure that she  has transportation arranged. Patient informed HC that she has  an appointment at [facility] on 03/15/22 @ 1:30p.     Plan of Care  [facility] [place], [facility]. will review patient's appointment desk and schedule transportation for her

## 2022-03-10 ENCOUNTER — CARE COORDINATION (OUTPATIENT)
Dept: CARE COORDINATION | Age: 57
End: 2022-03-10

## 2022-03-10 ENCOUNTER — CARE COORDINATION (OUTPATIENT)
Dept: CASE MANAGEMENT | Age: 57
End: 2022-03-10

## 2022-03-10 NOTE — CARE COORDINATION
Diabetes zone tool, MYchart, walk-in clinic and right care-right place-right time information sheets was mailed out to pt.

## 2022-03-10 NOTE — CARE COORDINATION
Request from [address] [facility] [address] , please schedule patient with [facility] Dr. Jorge Wright    Patient scheduled for 3/15 @ 1:30pm    Janett Vilchis, [facility]  Care Coordination Transition

## 2022-03-10 NOTE — CARE COORDINATION
HC phoned patient's transportation system and arranged for her travels to her medical appointments. Patient is scheduled for transportation on Tuesday, 03/15/22, for 1:30p, PCP appointment, [address] [facility], [id number]. Patient can be picked up as early as 11:45a, Trip [id number]. Transportation was arranged for Tuesday, 03/22/22 for 1:45p, Podiatry appointment, [address]. [facility], [id number]. Patient can be picked up as early as 12:15p, trip # [id number]. HC will text this information to the patient. HC also left a message for patient, on her voicemail.     Plan of Care  [facility] [employer], [facility]. will continue to assist patient with transportation

## 2022-03-14 ENCOUNTER — CARE COORDINATION (OUTPATIENT)
Dept: CARE COORDINATION | Age: 57
End: 2022-03-14

## 2022-03-15 ENCOUNTER — OFFICE VISIT (OUTPATIENT)
Dept: FAMILY MEDICINE CLINIC | Age: 57
End: 2022-03-15
Payer: COMMERCIAL

## 2022-03-15 ENCOUNTER — HOSPITAL ENCOUNTER (OUTPATIENT)
Age: 57
Setting detail: SPECIMEN
Discharge: HOME | End: 2022-03-15

## 2022-03-15 VITALS
WEIGHT: 247 LBS | HEART RATE: 87 BPM | DIASTOLIC BLOOD PRESSURE: 67 MMHG | HEIGHT: 66 IN | SYSTOLIC BLOOD PRESSURE: 109 MMHG | BODY MASS INDEX: 39.7 KG/M2

## 2022-03-15 DIAGNOSIS — E11.628 TYPE 2 DIABETES MELLITUS WITH RIGHT DIABETIC FOOT INFECTION (HCC): ICD-10-CM

## 2022-03-15 DIAGNOSIS — R51.9 INTRACTABLE HEADACHE, UNSPECIFIED CHRONICITY PATTERN, UNSPECIFIED HEADACHE TYPE: ICD-10-CM

## 2022-03-15 DIAGNOSIS — E11.9 TYPE 2 DIABETES MELLITUS WITHOUT COMPLICATION, WITHOUT LONG-TERM CURRENT USE OF INSULIN (HCC): ICD-10-CM

## 2022-03-15 DIAGNOSIS — E11.69 DIABETES MELLITUS TYPE 2 IN OBESE (HCC): ICD-10-CM

## 2022-03-15 DIAGNOSIS — D50.8 OTHER IRON DEFICIENCY ANEMIA: ICD-10-CM

## 2022-03-15 DIAGNOSIS — J45.21 MILD INTERMITTENT ASTHMA WITH ACUTE EXACERBATION: ICD-10-CM

## 2022-03-15 DIAGNOSIS — R79.89 CREATININE ELEVATION: Primary | ICD-10-CM

## 2022-03-15 DIAGNOSIS — E66.9 DIABETES MELLITUS TYPE 2 IN OBESE (HCC): ICD-10-CM

## 2022-03-15 DIAGNOSIS — G62.9 NEUROPATHY: ICD-10-CM

## 2022-03-15 DIAGNOSIS — L97.522 ULCER OF LEFT FOOT, WITH FAT LAYER EXPOSED (HCC): Primary | ICD-10-CM

## 2022-03-15 DIAGNOSIS — L08.9 TYPE 2 DIABETES MELLITUS WITH RIGHT DIABETIC FOOT INFECTION (HCC): ICD-10-CM

## 2022-03-15 LAB
ABSOLUTE EOS #: 0.16 K/UL (ref 0–0.44)
ABSOLUTE IMMATURE GRANULOCYTE: 0.03 K/UL (ref 0–0.3)
ABSOLUTE LYMPH #: 2.51 K/UL (ref 1.1–3.7)
ABSOLUTE MONO #: 0.93 K/UL (ref 0.1–1.2)
BASOPHILS # BLD: 1 % (ref 0–2)
BASOPHILS ABSOLUTE: 0.04 K/UL (ref 0–0.2)
C-REACTIVE PROTEIN: 16 MG/L (ref 0–5)
CREAT SERPL-MCNC: 1.12 MG/DL (ref 0.5–0.9)
EOSINOPHILS RELATIVE PERCENT: 2 % (ref 1–4)
FERRITIN: 42 UG/L (ref 13–150)
FOLATE: >20 NG/ML
GFR AFRICAN AMERICAN: >60 ML/MIN
GFR NON-AFRICAN AMERICAN: 51 ML/MIN
GFR SERPL CREATININE-BSD FRML MDRD: ABNORMAL ML/MIN/{1.73_M2}
HCT VFR BLD CALC: 33.8 % (ref 36.3–47.1)
HEMOGLOBIN: 10.2 G/DL (ref 11.9–15.1)
IMMATURE GRANULOCYTES: 0 %
IRON SATURATION: 14 % (ref 20–55)
IRON: 42 UG/DL (ref 37–145)
LYMPHOCYTES # BLD: 34 % (ref 24–43)
MCH RBC QN AUTO: 29.9 PG (ref 25.2–33.5)
MCHC RBC AUTO-ENTMCNC: 30.2 G/DL (ref 28.4–34.8)
MCV RBC AUTO: 99.1 FL (ref 82.6–102.9)
MONOCYTES # BLD: 13 % (ref 3–12)
NRBC AUTOMATED: 0 PER 100 WBC
PDW BLD-RTO: 13.7 % (ref 11.8–14.4)
PLATELET # BLD: 340 K/UL (ref 138–453)
PMV BLD AUTO: 11.3 FL (ref 8.1–13.5)
RBC # BLD: 3.41 M/UL (ref 3.95–5.11)
SEG NEUTROPHILS: 50 % (ref 36–65)
SEGMENTED NEUTROPHILS ABSOLUTE COUNT: 3.73 K/UL (ref 1.5–8.1)
TOTAL IRON BINDING CAPACITY: 309 UG/DL (ref 250–450)
UNSATURATED IRON BINDING CAPACITY: 267 UG/DL (ref 112–347)
VITAMIN B-12: 379 PG/ML (ref 232–1245)
WBC # BLD: 7.4 K/UL (ref 3.5–11.3)

## 2022-03-15 PROCEDURE — G8484 FLU IMMUNIZE NO ADMIN: HCPCS | Performed by: STUDENT IN AN ORGANIZED HEALTH CARE EDUCATION/TRAINING PROGRAM

## 2022-03-15 PROCEDURE — 2022F DILAT RTA XM EVC RTNOPTHY: CPT | Performed by: STUDENT IN AN ORGANIZED HEALTH CARE EDUCATION/TRAINING PROGRAM

## 2022-03-15 PROCEDURE — 3017F COLORECTAL CA SCREEN DOC REV: CPT | Performed by: STUDENT IN AN ORGANIZED HEALTH CARE EDUCATION/TRAINING PROGRAM

## 2022-03-15 PROCEDURE — G8417 CALC BMI ABV UP PARAM F/U: HCPCS | Performed by: STUDENT IN AN ORGANIZED HEALTH CARE EDUCATION/TRAINING PROGRAM

## 2022-03-15 PROCEDURE — 99213 OFFICE O/P EST LOW 20 MIN: CPT | Performed by: STUDENT IN AN ORGANIZED HEALTH CARE EDUCATION/TRAINING PROGRAM

## 2022-03-15 PROCEDURE — 3046F HEMOGLOBIN A1C LEVEL >9.0%: CPT | Performed by: STUDENT IN AN ORGANIZED HEALTH CARE EDUCATION/TRAINING PROGRAM

## 2022-03-15 PROCEDURE — 1036F TOBACCO NON-USER: CPT | Performed by: STUDENT IN AN ORGANIZED HEALTH CARE EDUCATION/TRAINING PROGRAM

## 2022-03-15 PROCEDURE — G8427 DOCREV CUR MEDS BY ELIG CLIN: HCPCS | Performed by: STUDENT IN AN ORGANIZED HEALTH CARE EDUCATION/TRAINING PROGRAM

## 2022-03-15 RX ORDER — BUDESONIDE AND FORMOTEROL FUMARATE DIHYDRATE 80; 4.5 UG/1; UG/1
2 AEROSOL RESPIRATORY (INHALATION) 2 TIMES DAILY
Qty: 1 EACH | Refills: 3 | Status: SHIPPED | OUTPATIENT
Start: 2022-03-15

## 2022-03-15 RX ORDER — MIRTAZAPINE 7.5 MG/1
7.5 TABLET, FILM COATED ORAL NIGHTLY
Qty: 30 TABLET | Refills: 3 | Status: ON HOLD | OUTPATIENT
Start: 2022-03-15 | End: 2022-09-27 | Stop reason: HOSPADM

## 2022-03-15 RX ORDER — ACETAMINOPHEN 500 MG
1000 TABLET ORAL 3 TIMES DAILY PRN
Qty: 180 TABLET | Refills: 0 | Status: ON HOLD | OUTPATIENT
Start: 2022-03-15 | End: 2022-09-27 | Stop reason: HOSPADM

## 2022-03-15 RX ORDER — LANCETS 30 GAUGE
1 EACH MISCELLANEOUS 3 TIMES DAILY
Qty: 200 EACH | Refills: 0 | Status: SHIPPED | OUTPATIENT
Start: 2022-03-15

## 2022-03-15 RX ORDER — VENLAFAXINE HYDROCHLORIDE 150 MG/1
150 CAPSULE, EXTENDED RELEASE ORAL
Qty: 30 CAPSULE | Refills: 3 | Status: ON HOLD | OUTPATIENT
Start: 2022-03-15 | End: 2022-09-27 | Stop reason: HOSPADM

## 2022-03-15 RX ORDER — GLUCOSAMINE HCL/CHONDROITIN SU 500-400 MG
CAPSULE ORAL
Qty: 100 STRIP | Refills: 0 | Status: ON HOLD | OUTPATIENT
Start: 2022-03-15 | End: 2022-09-27 | Stop reason: SDUPTHER

## 2022-03-15 RX ORDER — FLUTICASONE PROPIONATE 50 MCG
1 SPRAY, SUSPENSION (ML) NASAL DAILY
Qty: 32 G | Refills: 10 | Status: SHIPPED | OUTPATIENT
Start: 2022-03-15

## 2022-03-15 RX ORDER — PREGABALIN 200 MG/1
200 CAPSULE ORAL 2 TIMES DAILY
Qty: 60 CAPSULE | Refills: 0 | Status: SHIPPED | OUTPATIENT
Start: 2022-03-15 | End: 2022-04-12 | Stop reason: SDUPTHER

## 2022-03-15 RX ORDER — TRAZODONE HYDROCHLORIDE 150 MG/1
150 TABLET ORAL NIGHTLY
Qty: 30 TABLET | Refills: 3 | Status: ON HOLD | OUTPATIENT
Start: 2022-03-15 | End: 2022-09-27 | Stop reason: HOSPADM

## 2022-03-15 RX ORDER — ALBUTEROL SULFATE 90 UG/1
2 AEROSOL, METERED RESPIRATORY (INHALATION) EVERY 4 HOURS PRN
Qty: 1 EACH | Refills: 5 | Status: SHIPPED | OUTPATIENT
Start: 2022-03-15 | End: 2022-04-15

## 2022-03-15 RX ORDER — BLOOD-GLUCOSE METER
1 KIT MISCELLANEOUS 3 TIMES DAILY
Qty: 100 EACH | Refills: 5 | Status: ON HOLD | OUTPATIENT
Start: 2022-03-15 | End: 2022-09-27 | Stop reason: HOSPADM

## 2022-03-15 RX ORDER — GLUCOSAMINE HCL/CHONDROITIN SU 500-400 MG
CAPSULE ORAL
Qty: 100 EACH | Refills: 5 | Status: SHIPPED | OUTPATIENT
Start: 2022-03-15

## 2022-03-15 RX ORDER — PANTOPRAZOLE SODIUM 20 MG/1
20 TABLET, DELAYED RELEASE ORAL
Qty: 30 TABLET | Refills: 3 | Status: SHIPPED | OUTPATIENT
Start: 2022-03-15 | End: 2022-09-08

## 2022-03-15 RX ORDER — PEN NEEDLE, DIABETIC 31 GX5/16"
1 NEEDLE, DISPOSABLE MISCELLANEOUS DAILY
Qty: 100 EACH | Refills: 3 | Status: SHIPPED | OUTPATIENT
Start: 2022-03-15

## 2022-03-15 RX ORDER — INSULIN GLARGINE 100 [IU]/ML
30 INJECTION, SOLUTION SUBCUTANEOUS 2 TIMES DAILY
Qty: 5 PEN | Refills: 5 | Status: SHIPPED | OUTPATIENT
Start: 2022-03-15 | End: 2022-04-22 | Stop reason: SDUPTHER

## 2022-03-15 ASSESSMENT — PATIENT HEALTH QUESTIONNAIRE - PHQ9
SUM OF ALL RESPONSES TO PHQ QUESTIONS 1-9: 0
8. MOVING OR SPEAKING SO SLOWLY THAT OTHER PEOPLE COULD HAVE NOTICED. OR THE OPPOSITE, BEING SO FIGETY OR RESTLESS THAT YOU HAVE BEEN MOVING AROUND A LOT MORE THAN USUAL: 0
4. FEELING TIRED OR HAVING LITTLE ENERGY: 0
SUM OF ALL RESPONSES TO PHQ9 QUESTIONS 1 & 2: 0
6. FEELING BAD ABOUT YOURSELF - OR THAT YOU ARE A FAILURE OR HAVE LET YOURSELF OR YOUR FAMILY DOWN: 0
1. LITTLE INTEREST OR PLEASURE IN DOING THINGS: 0
SUM OF ALL RESPONSES TO PHQ QUESTIONS 1-9: 0
9. THOUGHTS THAT YOU WOULD BE BETTER OFF DEAD, OR OF HURTING YOURSELF: 0
5. POOR APPETITE OR OVEREATING: 0
SUM OF ALL RESPONSES TO PHQ QUESTIONS 1-9: 0
2. FEELING DOWN, DEPRESSED OR HOPELESS: 0
10. IF YOU CHECKED OFF ANY PROBLEMS, HOW DIFFICULT HAVE THESE PROBLEMS MADE IT FOR YOU TO DO YOUR WORK, TAKE CARE OF THINGS AT HOME, OR GET ALONG WITH OTHER PEOPLE: 0
3. TROUBLE FALLING OR STAYING ASLEEP: 0
SUM OF ALL RESPONSES TO PHQ QUESTIONS 1-9: 0
7. TROUBLE CONCENTRATING ON THINGS, SUCH AS READING THE NEWSPAPER OR WATCHING TELEVISION: 0

## 2022-03-15 ASSESSMENT — ENCOUNTER SYMPTOMS
SORE THROAT: 0
WHEEZING: 0
ANAL BLEEDING: 0
COUGH: 0
SINUS PRESSURE: 0
SINUS PAIN: 0
CHEST TIGHTNESS: 0
SHORTNESS OF BREATH: 0
NAUSEA: 0
DIARRHEA: 0
COLOR CHANGE: 0
ABDOMINAL DISTENTION: 0
ABDOMINAL PAIN: 0

## 2022-03-15 NOTE — PATIENT INSTRUCTIONS
Per physician pt is to call our office . Medications e-scribed to pharmacy of patient's choice    dme order for walker given to pt referral for [facility] [phone]    tv    Labs given to patient, they will have them done before their next visit.

## 2022-03-15 NOTE — PROGRESS NOTES
Patient screened to determine if Continuous Glucose Monitoring (CGM) is appropriate at this time. Patient would benefit from CGM but does not meets the following Medicaid requirements:    1. Patient has diagnosed insulin dependent Type 2 diabetes mellitus  2. Patient utilizes 2 insulin injections per day to manage their diagnosis  a. Requires 3 insulin injections per day   3. Patient is unresponsive to standard medical therapy and A1c is not at goal  4. Patient has completed a comprehensive diabetes education program within the last 12 months  5. Specifically, patient has an A1c greater than or equal to 7% despite appropriate adjustments to therapy based on previous short term CGM and/or self monitoring  6.  Additionally, patient has Motivation to achieve and maintain improved glycemic control     Saad Del Rosario, PharmD   PGY-1 Pharmacy Resident  Medication Management Service - Replaced by [facility]  [phone]    03/15/22 2:12 PM

## 2022-03-15 NOTE — PROGRESS NOTES
Subjective:    Ion Perez is a 49 y.o. female with  has a past medical history of Acid reflux, Acute cystitis with hematuria, Acute non-recurrent maxillary sinusitis, Asthma, Bipolar 1 disorder ([address] Bipolar disorder, mixed ([address] BMI 34.0-34.9,adult, Cannabis use disorder, severe, dependence ([address] Cerebrovascular accident (CVA) ([address] Chest pain, Chronic renal insufficiency, Cocaine abuse ([address] COVID-19 virus RNA test result unknown, DDD (degenerative disc disease), cervical, Diabetes mellitus ([address] Dizziness, Fibromyalgia, History of stroke, Homicidal ideation, Hyperglycemia, Hypertension, Hypotension, IDDM (insulin dependent diabetes mellitus), Lupus ([address] Migraine, Neuropathy, Neuropathy, Polysubstance abuse ([address] Post traumatic stress disorder (PTSD), Posttraumatic stress disorder, Recurrent depression ([address] Recurrent major depressive disorder, in partial remission ([address] Screening mammogram, encounter for, Severe recurrent major depression with psychotic features ([address] Severe recurrent major depression without psychotic features ([address] Stroke (cerebrum) ([address] Stroke ([address] Suicidal ideation, Suicidal intent, Vitamin D deficiency, and White matter changes. Presented to the office today for:  Chief Complaint   Patient presents with    Diabetes     8 week f/u        HPI      Patient is a 53-year-old female with past medical history of hypertension, diabetes, asthma, history of lupus. Patient was admitted to the hospital 1/22/2022 for right foot infection. At that time she was assessed by podiatry and had a debridement of the right foot twice. Infectious disease was consulted at that time for polymicrobial wound infection. She received 8 days of Unasyn and consequently 2 weeks of Invanz. Patient was saw infectious disease for an outpatient follow-up on 02/24/2021 at which time she was started on Keflex 4 times daily for 30 days she has another follow-up appointment at the end of this month. She also has an appointment podiatry at the end of this month. At this time patient is complaining of tenderness in the right foot. She is changing wound dressing herself. She is requesting home health nurses as well as a wheelchair. Review of Systems   Constitutional: Negative for fatigue and fever. HENT: Negative for sinus pressure, sinus pain, sore throat and tinnitus. Eyes: Negative for visual disturbance. Respiratory: Negative for cough, chest tightness, shortness of breath and wheezing. Cardiovascular: Negative for chest pain, palpitations and leg swelling. Gastrointestinal: Negative for abdominal distention, abdominal pain, anal bleeding, diarrhea and nausea. Genitourinary: Negative for enuresis, frequency and urgency. Musculoskeletal: Negative for arthralgias, gait problem and neck stiffness. Skin: Positive for wound (right foot, worsening tenderness). Negative for color change and rash. Neurological: Negative for dizziness and headaches. Psychiatric/Behavioral: Negative for agitation and confusion. The patient has a   Family History   Problem Relation Age of Onset    Diabetes Mother     Stroke Mother     Diabetes Father     Diabetes Sister     Diabetes Brother     Other Son         GSW    No Known Problems Sister        Objective:    /67 (Site: Left Upper Arm, Position: Sitting, Cuff Size: Large Adult)   Pulse 87   Ht 5' 6\" (1.676 m)   Wt 247 lb (112 kg)   LMP  (LMP Unknown)   BMI 39.87 kg/m²    BP Readings from Last 3 Encounters:   03/15/22 109/67   02/24/22 128/74   02/08/22 136/87       Physical Exam  Constitutional:       Appearance: Normal appearance. HENT:      Head: Atraumatic. Mouth/Throat:      Mouth: Mucous membranes are moist.      Pharynx: No oropharyngeal exudate or posterior oropharyngeal erythema. Eyes:      Extraocular Movements: Extraocular movements intact.    Cardiovascular:      Rate and Rhythm: Normal rate and regular rhythm. Heart sounds: No murmur heard. No friction rub. No gallop. Pulmonary:      Effort: Pulmonary effort is normal.      Breath sounds: No wheezing, rhonchi or rales. Abdominal:      General: Abdomen is flat. Tenderness: There is no abdominal tenderness. There is no guarding. Hernia: No hernia is present. Musculoskeletal:         General: No swelling or tenderness. Normal range of motion. Cervical back: Normal range of motion. Skin:     General: Skin is warm. Capillary Refill: Capillary refill takes less than 2 seconds. Coloration: Skin is not jaundiced. Findings: Lesion (refer to media for pictures, wound is open and not draining at this time. wound appears to be healing well with granulation tissue. No purulent discharge is noted. ) present. No bruising. Neurological:      Mental Status: She is alert and oriented to person, place, and time. Lab Results   Component Value Date    WBC 6.4 02/08/2022    HGB 7.5 (L) 02/08/2022    HCT 24.8 (L) 02/08/2022     02/08/2022    CHOL 275 (H) 12/30/2020    TRIG 246 (H) 12/30/2020    HDL 80 12/30/2020    ALT 14 01/22/2022    AST 14 01/22/2022     02/08/2022    K 4.4 02/08/2022     02/08/2022    CREATININE 0.80 02/08/2022    BUN 15 02/08/2022    CO2 22 02/08/2022    TSH 0.82 09/16/2021    INR 1.0 01/24/2022    LABA1C 10.0 01/20/2022    LABMICR 20 10/20/2020     Lab Results   Component Value Date    CALCIUM 8.7 02/08/2022    PHOS 3.2 11/06/2021     Lab Results   Component Value Date    LDLCHOLESTEROL 146 (H) 12/30/2020       Assessment and Plan:    1. Ulcer of left foot, with fat layer exposed ([address]  - [facility] Order for Lesa Michaels as OP  -Patient would benefit from home health care nurses to help with her lower extremity wound dressings. She would also need help with medication management as well as day-to-day activities around the house.     2. Diabetes mellitus type 2 in obese ([facility])  - insulin glargine (LANTUS SOLOSTAR) 100 UNIT/ML injection pen; Inject 30 Units into the skin 2 times daily  Dispense: 5 pen; Refill: 5  - metFORMIN (GLUCOPHAGE) 1000 MG tablet; Take 1 tablet by mouth 2 times daily (with meals)  Dispense: 60 tablet; Refill: 3  - pantoprazole (PROTONIX) 20 MG tablet; Take 1 tablet by mouth 2 times daily (before meals)  Dispense: 30 tablet; Refill: 3  - venlafaxine (EFFEXOR XR) 150 MG extended release capsule; Take 1 capsule by mouth daily (with breakfast)  Dispense: 30 capsule; Refill: 3    3. Type 2 diabetes mellitus without complication, without long-term current use of insulin ([facility])  - Insulin Pen Needle ([employer] PEN NEEDLES 31G) 31G X 8 MM MISC; 1 each by Does not apply route daily  Dispense: 100 each; Refill: 3  - [place] [facility] Order for Juan Fuentes as OP    4. Neuropathy  - pregabalin (LYRICA) 200 MG capsule; Take 1 capsule by mouth 2 times daily for 30 days. Dispense: 60 capsule; Refill: 0    5. Other iron deficiency anemia  -Baseline hemoglobin around 7-8. Patient states that she has history of iron deficiency anemia  -We will order a B12 folate as well as iron studies. Concern for B12 or folate deficiency due to macrocytic anemia  - Vitamin B12 & Folate; Future  - Iron and TIBC; Future  - Ferritin; Future    6. Intractable headache, unspecified chronicity pattern, unspecified headache type  - acetaminophen (TYLENOL) 500 MG tablet; Take 2 tablets by mouth 3 times daily as needed for Pain  Dispense: 180 tablet; Refill: 0    7. Mild intermittent asthma with acute exacerbation  - budesonide-formoterol (SYMBICORT) 80-4.5 MCG/ACT AERO; Inhale 2 puffs into the lungs 2 times daily  Dispense: 1 each; Refill: 3  - albuterol sulfate  (90 Base) MCG/ACT inhaler; Inhale 2 puffs into the lungs every 4 hours as needed for Wheezing  Dispense: 1 each;  Refill: 5          Requested Prescriptions     Signed Prescriptions Disp Refills    pregabalin (LYRICA) 200 MG capsule 60 capsule 0     Sig: Take 1 capsule by mouth 2 times daily for 30 days.  acetaminophen (TYLENOL) 500 MG tablet 180 tablet 0     Sig: Take 2 tablets by mouth 3 times daily as needed for Pain    insulin glargine (LANTUS SOLOSTAR) 100 UNIT/ML injection pen 5 pen 5     Sig: Inject 30 Units into the skin 2 times daily    fluticasone (FLONASE) 50 MCG/ACT nasal spray 32 g 10     Si spray by Each Nostril route daily    metFORMIN (GLUCOPHAGE) 1000 MG tablet 60 tablet 3     Sig: Take 1 tablet by mouth 2 times daily (with meals)    mirtazapine (REMERON) 7.5 MG tablet 30 tablet 3     Sig: Take 1 tablet by mouth nightly    pantoprazole (PROTONIX) 20 MG tablet 30 tablet 3     Sig: Take 1 tablet by mouth 2 times daily (before meals)    traZODone (DESYREL) 150 MG tablet 30 tablet 3     Sig: Take 1 tablet by mouth nightly    venlafaxine (EFFEXOR XR) 150 MG extended release capsule 30 capsule 3     Sig: Take 1 capsule by mouth daily (with breakfast)    Alcohol Swabs 70 % PADS 100 each 5     Sig: Use 1 swab 3 times daily as needed    blood glucose monitor strips 100 strip 0     Sig: Test 3 times a day & as needed for symptoms of irregular blood glucose. Dispense sufficient amount for indicated testing frequency plus additional to accommodate PRN testing needs.     Lancets MISC 200 each 0     Si each by Does not apply route 3 times daily    Insulin Pen Needle ([employer] PEN NEEDLES 31G) 31G X 8 MM MISC 100 each 3     Si each by Does not apply route daily    FREESTYLE LITE strip 100 each 5     Si each by Does not apply route 3 times daily    budesonide-formoterol (SYMBICORT) 80-4.5 MCG/ACT AERO 1 each 3     Sig: Inhale 2 puffs into the lungs 2 times daily    albuterol sulfate  (90 Base) MCG/ACT inhaler 1 each 5     Sig: Inhale 2 puffs into the lungs every 4 hours as needed for Wheezing       Medications Discontinued During This Encounter   Medication Reason    albuterol sulfate  (90 Base) MCG/ACT inhaler REORDER    budesonide-formoterol (SYMBICORT) 80-4.5 MCG/ACT AERO REORDER    FREESTYLE LITE strip REORDER    blood glucose monitor strips REORDER    Lancets MISC REORDER    Insulin Pen Needle ([employer] PEN NEEDLES 31G) 31G X 8 MM MISC REORDER    Alcohol Swabs 70 % PADS REORDER    metFORMIN (GLUCOPHAGE) 1000 MG tablet REORDER    mirtazapine (REMERON) 7.5 MG tablet REORDER    pantoprazole (PROTONIX) 20 MG tablet REORDER    traZODone (DESYREL) 150 MG tablet REORDER    venlafaxine (EFFEXOR XR) 150 MG extended release capsule REORDER    fluticasone (FLONASE) 50 MCG/ACT nasal spray REORDER    insulin glargine (LANTUS SOLOSTAR) 100 UNIT/ML injection pen REORDER    acetaminophen (TYLENOL) 500 MG tablet REORDER    pregabalin (LYRICA) 200 MG capsule REORDER       Terri received counseling on the following healthy behaviors: nutrition, exercise and medication adherence    Discussed use,benefit, and side effects of prescribed medications. Barriers to medication compliance addressed. All patient questions answered. Pt voiced understanding. Return in about 4 weeks (around 4/12/2022). Disclaimer: Some orall of this note was transcribed using voice-recognition software. This may cause typographical errors occasionally. Although all effort is made to fix these errors, please do not hesitate to contact our office if there Laura Heading concern with the understanding of this note.

## 2022-03-15 NOTE — PROGRESS NOTES
Attending Physician Statement  I have discussed the care of [address] [place] [address], including pertinent history and exam findings,  with the resident. I have reviewed the key elements of all parts of the encounter with the resident. I agree with the assessment, plan and orders as documented by the resident.   (Gilberto Carrillo)    Shruti Adams MD

## 2022-03-16 ENCOUNTER — CARE COORDINATION (OUTPATIENT)
Dept: CARE COORDINATION | Age: 57
End: 2022-03-16

## 2022-03-17 ENCOUNTER — CARE COORDINATION (OUTPATIENT)
Dept: CARE COORDINATION | Age: 57
End: 2022-03-17

## 2022-03-17 NOTE — CARE COORDINATION
HC spoke with patient today, she states that she needs to have blood work prior to her  appointment on 03/28/22 with Dr. Miya Farnsworth. Patient and [facility], [facility]. agreed to schedule patient to  go for blood work on Wednesday, 03/23/22, to [facility], @ [address] [place], [address] [facility]. Patient is scheduled to arrive for her blood work by 1p., patient can be picked up   as early as 11:30a, even though the transportation can arrive after that time. Patient is   scheduled to be picked up for her return ride home @ 2p. Confirmation number for patient's  trip is [id number]. This information will be texted to the patient.     Plan of Care  [facility], [facility]. will follow up with patient and transportation plans

## 2022-03-21 ENCOUNTER — CARE COORDINATION (OUTPATIENT)
Dept: CARE COORDINATION | Age: 57
End: 2022-03-21

## 2022-03-21 NOTE — CARE COORDINATION
[facility]. phoned patient to give her a reminder of her transportation for her podiatry appointment.  informed the patient that she should be ready for her transportation as early as 12:15p. Patient stated an understanding.     Plan of Care  [facility] will follow up with patient on her upcoming appointments

## 2022-03-22 ENCOUNTER — CARE COORDINATION (OUTPATIENT)
Dept: CARE COORDINATION | Age: 57
End: 2022-03-22

## 2022-03-22 ENCOUNTER — OFFICE VISIT (OUTPATIENT)
Dept: PODIATRY | Age: 57
End: 2022-03-22
Payer: COMMERCIAL

## 2022-03-22 VITALS — WEIGHT: 242 LBS | BODY MASS INDEX: 40.32 KG/M2 | HEIGHT: 65 IN

## 2022-03-22 DIAGNOSIS — Z98.890 POST-OPERATIVE STATE: ICD-10-CM

## 2022-03-22 DIAGNOSIS — E11.621 DIABETIC ULCER OF RIGHT MIDFOOT ASSOCIATED WITH TYPE 2 DIABETES MELLITUS, WITH NECROSIS OF MUSCLE (HCC): ICD-10-CM

## 2022-03-22 DIAGNOSIS — L97.413 DIABETIC ULCER OF RIGHT MIDFOOT ASSOCIATED WITH TYPE 2 DIABETES MELLITUS, WITH NECROSIS OF MUSCLE (HCC): ICD-10-CM

## 2022-03-22 DIAGNOSIS — L97.513 RIGHT FOOT ULCER, WITH NECROSIS OF MUSCLE (HCC): Primary | ICD-10-CM

## 2022-03-22 PROCEDURE — 99024 POSTOP FOLLOW-UP VISIT: CPT | Performed by: PODIATRIST

## 2022-03-22 PROCEDURE — 3046F HEMOGLOBIN A1C LEVEL >9.0%: CPT | Performed by: PODIATRIST

## 2022-03-22 PROCEDURE — 11043 DBRDMT MUSC&/FSCA 1ST 20/<: CPT | Performed by: PODIATRIST

## 2022-03-22 NOTE — PROGRESS NOTES
[address] [facility] PODIATRY [facility]  [address] [place] [address]  Dept: [phone]  Dept Fax: [phone]    POST-OP PROGRESS NOTE  Date of patient's visit: 3/22/2022  Patient's Name:  Heri Prasad YOB: 1965            Patient Care Team:  Sheron Linares MD as PCP - General (Family Medicine)  Irene Flores MD as Consulting Physician (Infectious Diseases)  Carmela Yanez MD as Consulting Physician (Infectious Diseases)  [facility] as Health   Oksanajoy Pruitt as [facility]  Makayla Butts RD, FRANCESCO as Dietitian        Chief Complaint   Patient presents with    Post-Op Check     7.5 weeks- FOOT ABSCESS INCISION AND DRAINAGE  (PULSE LAVAGE, CULTURE SWABS)         Subjective: Heri Prasad is a 49 y.o. female who presents to the office today 7week(s)  S/P right  FOOT ABSCESS INCISION AND DRAINAGE  (PULSE LAVAGE, CULTURE SWABS) for correction of right foot infection. Problem List Items Addressed This Visit     None      Patient relates pain is Absent  and improved. Pain is rated 4 out of 10 and is described as constant, severe, excruciating. Currently denies F/C/N/V. Is patient taking pain medications as prescribed and is controlling pain n/a    Physical Examination:  Wound #:   1    diabetic foot ulcer and dehisced surgical wound   right foot    Wound measurements:  #1  5 mm by 3 cm  by   2 mm        Wound Depth: muscle. Drainage:    minimal, serosanguinous Type:minimal, serosanguinous  Wound Bed status:   Granulation Tissue: 90%   Necrotic 10%  Type:   epithelialization 0%   Hypergranular 0%   Periwound hyperkeratosis:  present  Erythema absent    Odor:no  Maceration:no  Undermining/tract/tunneling:no  Culture taken:   yes                   Assessment: Heri Prasad is status post as above  Normal post operative course. Doing well         Diagnosis Orders   1.  Right foot ulcer, with necrosis of muscle (HCC)  NY DEBRIDEMENT, SKIN, SUB-Q TISSUE,MUSCLE,=<20 SQ CM   2. Diabetic ulcer of right midfoot associated with type 2 diabetes mellitus, with necrosis of muscle (HCC)  NY DEBRIDEMENT, SKIN, SUB-Q TISSUE,MUSCLE,=<20 SQ CM   3. Post-operative state  NY DEBRIDEMENT, SKIN, SUB-Q TISSUE,MUSCLE,=<20 SQ CM           Plan:  Patient examined and evaluated. Current condition and treatment options discussed in detail. Advised pt to her ocnditon. She is in the post op period but the wound is healthy with no purulence seen      With the patient in supine position, Lidocaine soaked gauze was applied per physician order at beginning of wound evaluation. It was subsequently removed. Using a #15 blade scalpel and Pick-up, the wound was debrided down to and included the removal of the following tissue:     [] epidermis, [] dermis, []  subcutaneous tissue,  [x] muscle/fascia tissue,   and/or  [] bone     Also debrided was all  [] fibrin, [] biofilm, [x] slough,  [x] necrotic,  [] hypergranulation tissue, and/or  [x] hyperkeratotic tissue. Wound was copiously irrigated with normal saline solution. Bleeding:  Minimal.  Hemostasis:  pressure     100%  of wound debrided. Patient tolerated procedure well. Pain 0 / 10 during procedure and pain 0 / 10 after procedure. Discussed appropriate home care of this wound. Wound redressed. Patient instructions were given. Dispensed dressing supplies and instructions on their use. .  Verbal and written instructions given to patient. Orders: No orders of the defined types were placed in this encounter. Contact office with any questions/problems/concerns. RTC in 3week(s).      Electronically signed by Fanta Herrera DPM on 3/22/2022 at 1:38 PM  3/22/2022

## 2022-03-22 NOTE — CARE COORDINATION
Patient phoned the [facility]. today, while waiting for the cab to take her to podiatry. She stated that someone told her that the cabs weren't running today. HC   asked the patient if she had received any information from the cab stating,  that they wouldn't be transporting her today. Patient stated that she hasn't  received a call from transportation. HC confirmed that the patients pickup   time could be as early as 12:15p, so she is early and should continue to wait.     Plan of Care  [facility], [facility]. will follow up with patient regarding other transportation later in the week

## 2022-03-22 NOTE — PATIENT INSTRUCTIONS
Schedule a Vaccine  When you qualify to receive the vaccine, call the [facility]) COVID-19 Vaccination Hotline to schedule your appointment or to get additional information about the [facility]) locations which are offering the COVID-19 vaccine. To be 94% effective, it's important that you receive two doses of one of the COVID-19 vaccines. -If you are receiving the [employer] vaccine, your second shot will be scheduled as close to 21 days after the first shot as possible. -If you are receiving the Moderna vaccine, your second shot will be scheduled as close to 28 days after the first shot as possible. [facility]) COVID-19 Vaccination Hotline: 929.111.3333    Links to [facility]) website and [facility] website:    [employer]/[facility]-monitoring-[email or web address]-covid-[email or web address]-19-vaccine/[email or web address]    [email or web address]

## 2022-03-23 ENCOUNTER — CARE COORDINATION (OUTPATIENT)
Dept: CARE COORDINATION | Age: 57
End: 2022-03-23

## 2022-03-23 NOTE — CARE COORDINATION
Patient left a message for the [facility]. stating that she wouldn't be able to keep her appointment with the cab, taking her to [facility]  for blood work. Patient stated that she wasn't feeling well. [facility]. phoned patient to verify that she had called to cancel her  Transportation.     Plan of Care  [facility] will instruct patient on scheduling her transportation for  Future appointments

## 2022-03-24 ENCOUNTER — CARE COORDINATION (OUTPATIENT)
Dept: CARE COORDINATION | Age: 57
End: 2022-03-24

## 2022-03-25 ENCOUNTER — CARE COORDINATION (OUTPATIENT)
Dept: CARE COORDINATION | Age: 57
End: 2022-03-25

## 2022-03-25 NOTE — CARE COORDINATION
[facility]. phoned this patient to follow up on her social needs. Patiet   answered and stated that she's in so much foot pain, and has nothing that she can take for the pain. HC suggested that the   patient get in contact with Rosetta Ledesma/STEPHON, and let her know about the pain that she's dealing with. HC then shared information with the patient regarding her need for assistance   with cleaning. HC shared with the patient that she is underage for assistance through [address] [place] [address] , and she would have to have private pay help with cleaning her apartment. Patient stated that  She would be willing to pay the $19.00 an hour for [facility]. HC provided the contact number for the patient,  via text message.     Care Plan   [facility] will follow up with patient regarding house cleaning

## 2022-03-25 NOTE — CARE COORDINATION
Nutrition Care Coordinator Follow-Up visit:    Food Recall: eating 2-3 meals/d    Activity Level:  Sedentary: X  Lightly Active: Moderately Active:  Very Active:    Adult BMI:  Underweight (below 18.5)  Normal Weight (18.5-24.9)  Overweight (25-29. 9)  Obese (30-39. 9)  Morbidly Obese (>40) X    Weight Change: no change    Plan:  Plan was established with patient:  Increase dietary fiber by consuming whole grains, fruits and vegetables: X  Limit dietary cholesterol to >200mg/day: Increase water intake:  Avoid added sugar: X  Avoid sweetened beverages: X  Choose lean meats: X      Monitoring: Will monitor weight:  Will monitor adherence to meal plan: Will monitor adherence to exercise plan: Will monitor HGA1c: X    Handouts Provided :  Low Carb snacking: X  Carb counting /individual meal plan:  Portion Control: X  Food Labels: X  Physical Activity:  Low Fat/Cholesterol:  Hypo/Hyperglycemia: X  Calorie Controlled Meal Plan:    Goals: Increase water consumption to 8oz. 6-8 times daily:  Manage blood sugars by consuming 3 meals spaced every 4-5 hours with 2-3 snacks daily: reviewed  Increase fiber and decrease fat intake by consuming 1-2 fruit servings and 2-3 vegetable servings per day. Increase physical activity by:  Consume less than 2,000mg of sodium/day  Avoid consumption of sweetened beverages and added sugar by reading food labels: reviewed  Monitor blood sugars by using meter to check blood glucose before morning meal and 2 hours after a meal daily:BS up and down per patient could not give me readings  Decrease risk of coronary heart disease by consuming fish that contains omega-3 fatty acids at least twice a week, avoiding partially hydrogenated oil/trans fats and limiting saturated fat intake by reading food labels:    Patient goals set:  1.  Reviewed importance of meal pattern, patient relays she does eat 2-3 meals/day-still skipping breakfast  at times.  Quick/easy breakfast suggestions discussed,

## 2022-03-28 ENCOUNTER — CARE COORDINATION (OUTPATIENT)
Dept: CARE COORDINATION | Age: 57
End: 2022-03-28

## 2022-03-28 ENCOUNTER — OFFICE VISIT (OUTPATIENT)
Dept: INFECTIOUS DISEASES | Age: 57
End: 2022-03-28
Payer: COMMERCIAL

## 2022-03-28 VITALS
HEIGHT: 65 IN | SYSTOLIC BLOOD PRESSURE: 144 MMHG | WEIGHT: 242 LBS | BODY MASS INDEX: 40.32 KG/M2 | HEART RATE: 98 BPM | DIASTOLIC BLOOD PRESSURE: 60 MMHG

## 2022-03-28 DIAGNOSIS — A49.01 MSSA (METHICILLIN SUSCEPTIBLE STAPHYLOCOCCUS AUREUS) INFECTION: ICD-10-CM

## 2022-03-28 DIAGNOSIS — E11.628 TYPE 2 DIABETES MELLITUS WITH RIGHT DIABETIC FOOT INFECTION (HCC): Primary | ICD-10-CM

## 2022-03-28 DIAGNOSIS — L08.9 TYPE 2 DIABETES MELLITUS WITH RIGHT DIABETIC FOOT INFECTION (HCC): Primary | ICD-10-CM

## 2022-03-28 PROCEDURE — 3046F HEMOGLOBIN A1C LEVEL >9.0%: CPT | Performed by: INTERNAL MEDICINE

## 2022-03-28 PROCEDURE — G8427 DOCREV CUR MEDS BY ELIG CLIN: HCPCS | Performed by: INTERNAL MEDICINE

## 2022-03-28 PROCEDURE — 2022F DILAT RTA XM EVC RTNOPTHY: CPT | Performed by: INTERNAL MEDICINE

## 2022-03-28 PROCEDURE — G8484 FLU IMMUNIZE NO ADMIN: HCPCS | Performed by: INTERNAL MEDICINE

## 2022-03-28 PROCEDURE — 3017F COLORECTAL CA SCREEN DOC REV: CPT | Performed by: INTERNAL MEDICINE

## 2022-03-28 PROCEDURE — G8417 CALC BMI ABV UP PARAM F/U: HCPCS | Performed by: INTERNAL MEDICINE

## 2022-03-28 PROCEDURE — 99214 OFFICE O/P EST MOD 30 MIN: CPT | Performed by: INTERNAL MEDICINE

## 2022-03-28 PROCEDURE — 1036F TOBACCO NON-USER: CPT | Performed by: INTERNAL MEDICINE

## 2022-03-28 RX ORDER — CEPHALEXIN 500 MG/1
500 CAPSULE ORAL 4 TIMES DAILY
Qty: 360 CAPSULE | Refills: 0 | Status: SHIPPED | OUTPATIENT
Start: 2022-03-28 | End: 2022-06-26

## 2022-03-28 ASSESSMENT — ENCOUNTER SYMPTOMS
COLOR CHANGE: 0
ABDOMINAL DISTENTION: 0
EYE DISCHARGE: 0
APNEA: 0

## 2022-03-28 NOTE — CARE COORDINATION
Patient called to inform the [facility] that she had been waiting for 45 minutes for the cab. Patient reported that she called [employer] and was told that the cab wouldn't be there before another hour. HC phoned  [employer] and was told that the cab was three minutes away. HC provided patient with this information and she stated that she saw the cab coming as we were speaking.     Plan of Care  [facility] will continue to assist patient with transportation needs

## 2022-03-28 NOTE — PROGRESS NOTES
[facility] - Initial Consult Note  Today's Date: 3/28/2022    Impression :   · Right second finger infected soft tissue then osteomyelitis  · R 2nd finger amputation and infection resolved fully - 10/27/21 no active hand infection  · Right hand soft tissue infection  · Diabetes on insulin  · SLE  Admission 2/8/22  · Right diabetic foot infection, GAS  · Cellulitis both feet w phlegmon in foot - no abscess - very tender foot and sole wound mild drainage  · cx left foot MSSA R clinda  and GAS- finegoldia and aerococcus - MRI no OM  · Sent on invanz daily till 2/28 w good response  · then send home on po keflex po x 30 days  Recommendations   · Resume keflex 4 x per day x 3 months  · Fluconazole 150 daily x 2 days and 3 refills         Diagnosis Orders   1. Type 2 diabetes mellitus with right diabetic foot infection (HCC)  cephALEXin (KEFLEX) 500 MG capsule   2. MSSA (methicillin susceptible Staphylococcus aureus) infection  cephALEXin (KEFLEX) 500 MG capsule       Return in about 7 weeks (around 5/16/2022). History of Present Illness:   Joe Plaza is a 49y.o.-year-old  female who presents with   Chief Complaint   Patient presents with    Frequent Infections   · Right second finger infected soft tissue then osteomyelitis  · Right hand soft tissue infection  · Diabetes on insulin  · SLE    · Keep fluconazole po 1 more  Week  · daptomycin daily x 7 days start now  · Plan DC w outpt infusion visit daily x 7   1. Current pus cx MSSA and kleb S ceftriaoxne  2. Past pus cx MR SCN and candida glabrata  3. Pt cant tolerate po AB  · Ok for DC  FU office 2 weeks    Visit 10/27/21  Stopped the po AB at day 2 - but the hand wound is closed and non inflammed.   Exam neg - she feels good  Sutures are still in place    Visit 2/24/22  Admission 2/8/22  · Right diabetic foot infection, GAS  · Cellulitis both feet w phlegmon in foot - no abscess - very tender foot and sole wound mild drainage  · cx left foot MSSA R clinda  and GAS- finegoldia and aerococcus - MRI no OM  · Sent on invanz daily till 2/24 w good response  · Bacteriuria vs UTI kleb S ancef    Right foot looks great much better - swelling and induration and tenderness are much better. Not tender - sole wound is clean and healthy looking  Had the iv out x 3 days ansd got ceftriaxone im x 1 then no AB x 2 days    Plan-  Keep total AB 3 weeks iv till 2/28 the pull line and DC home on keflex 4 x per day x 30 days  This way we treat a suspected but no diagnosed osteomyelitis right foot  See me in a month    Visit 3/28/22  Still on keflex  foot better but still mild;ly sore and wound still draining on the sole and not healed yet  Exam neg  Vaginal fungal infection    Much better  Still on keflex  Keep keflex x 3 months  FU w podiatry  seem in 3 months            I have personally reviewed the past medical history, past surgical history, medications, social history, and family history, and I haveupdated the database accordingly.   Past Medical History:     Past Medical History:   Diagnosis Date    Acid reflux 5/29/2017    Acute cystitis with hematuria 10/11/2016    Acute non-recurrent maxillary sinusitis 11/28/2017    Asthma     Bipolar 1 disorder ([address] 1/4/2018    Bipolar disorder, mixed ([address]     BMI 34.0-34.9,adult 11/28/2017    Cannabis use disorder, severe, dependence ([address] 12/19/2017    Cerebrovascular accident (CVA) ([address] 6/14/2017    Chest pain 11/5/2016    Chronic renal insufficiency     Cocaine abuse ([address] 1/5/2018    COVID-19 virus RNA test result unknown     DDD (degenerative disc disease), cervical     Diabetes mellitus ([address]     Dizziness 6/13/2017    Fibromyalgia     History of stroke 9/9/2017    Homicidal ideation 11/6/2017    Hyperglycemia     Hypertension     Hypotension 1/18/2019    IDDM (insulin dependent diabetes mellitus) 12/21/2015    Lupus (HCC)     Migraine     Neuropathy     Neuropathy  Polysubstance abuse ([facility] 9/17/2017    Post traumatic stress disorder (PTSD)     Posttraumatic stress disorder 5/29/2017    Recurrent depression ([address] 5/29/2017    Recurrent major depressive disorder, in partial remission ([address] 11/28/2017    Screening mammogram, encounter for 11/28/2017    Severe recurrent major depression with psychotic features ([address] 12/19/2017    Severe recurrent major depression without psychotic features ([address] 12/19/2017    Stroke (cerebrum) ([address] 6/14/2017    Stroke ([facility]     per chart notes    Suicidal ideation     Suicidal intent 3/10/2017    Vitamin D deficiency 11/28/2017    White matter changes 6/13/2017       Past Surgical  History:     Past Surgical History:   Procedure Laterality Date    ABDOMEN SURGERY      drain tube    ABSCESS DRAINAGE      right buttock    CATARACT REMOVAL WITH IMPLANT Bilateral     CHEST TUBE INSERTION      FINGER AMPUTATION Left 03/13/2021    LEFT RING FINER AMPUTATION performed by Kyle Linares MD at [facility] Right 10/11/2021    AMPUTATION RIGHT INDEX FINGER performed by Kyle Linares MD at [address] [place] [address] Right 1/27/2022    FOOT ABSCESS INCISION AND DRAINAGE  (PULSE LAVAGE, CULTURE SWABS) performed by Namita Stapleton DPM at [address] [place] [address] Right 01/27/2022    FOOT ABSCESS INCISION AND DRAINAGE (PULSE LAVAGE, CULTURE SWABS) - Right    HAND SURGERY Right 09/14/2021    I&D INDEX FINGER performed by Kyle Linares MD at [address] [place] [address] Right 09/15/2021    I&D INDEX FINGER performed by Kyle Linares MD at [place] [address]  2/3/2022         LASIK Bilateral        Medications:     Current Outpatient Medications:     cephALEXin (KEFLEX) 500 MG capsule, Take 1 capsule by mouth 4 times daily, Disp: 360 capsule, Rfl: 0    pregabalin (LYRICA) 200 MG capsule, Take 1 capsule by mouth 2 times daily for 30 days. , Disp: 60 capsule, Rfl: 0    acetaminophen care. Apply to right foot wound, cover with Kerlix and ace wrap. , Disp: , Rfl:     Misc. Devices MISC, 1 PAIR OF DIABETIC SHOES (1 LEFT/ 1 RIGHT) 1-3 PAIRS OF INSERTS (LEFT/ RIGHT), Disp: 2 each, Rfl: 0    dicyclomine (BENTYL) 10 MG capsule, Take 1 capsule by mouth 4 times daily, Disp: 120 capsule, Rfl: 3    FLOVENT  MCG/ACT inhaler, Inhale 2 puffs into the lungs 2 times daily, Disp: 1 Inhaler, Rfl: 3    ibuprofen (ADVIL;MOTRIN) 800 MG tablet, Take 1 tablet by mouth every 8 hours as needed for Pain, Disp: 42 tablet, Rfl: 1      Social History:     Social History     Socioeconomic History    Marital status: Legally      Spouse name: Not on file    Number of children: Not on file    Years of education: Not on file    Highest education level: Not on file   Occupational History    Not on file   Tobacco Use    Smoking status: Never Smoker    Smokeless tobacco: Never Used   Vaping Use    Vaping Use: Never used   Substance and Sexual Activity    Alcohol use: Not Currently    Drug use: Yes     Types: Marijuana ([address] [employer]), Cocaine     Comment: + Cocaine 2/2021 also, see Care Everywhere UDS    Sexual activity: Not Currently     Partners: Male   Other Topics Concern    Not on file   Social History Narrative    Not on file     Social Determinants of Health     Financial Resource Strain: High Risk    Difficulty of Paying Living Expenses: Hard   Food Insecurity: Food Insecurity Present    Worried About Running Out of Food in the Last Year: Often true    Kelin of Food in the Last Year: Often true   Transportation Needs: Unmet Transportation Needs    Lack of Transportation (Medical):  Yes    Lack of Transportation (Non-Medical): Yes   Physical Activity: Inactive    Days of Exercise per Week: 0 days    Minutes of Exercise per Session: 0 min   Stress: Stress Concern Present    Feeling of Stress : Rather much   Social Connections:     Frequency of Communication with Friends and Family: Not on file    Frequency of Social Gatherings with Friends and Family: Not on file    Attends [employer] Services: Not on file    Active Member of Clubs or Organizations: Not on file    Attends Club or Organization Meetings: Not on file    Marital Status: Not on file   Intimate Partner Violence:     Fear of Current or Ex-Partner: Not on file    Emotionally Abused: Not on file    Physically Abused: Not on file    Sexually Abused: Not on file   Housing Stability: High Risk    Unable to Pay for Housing in the Last Year: Yes    Number of Places Lived in the Last Year: 2    Unstable Housing in the Last Year: Yes       Family History:     Family History   Problem Relation Age of Onset    Diabetes Mother     Stroke Mother     Diabetes Father     Diabetes Sister     Diabetes Brother     Other Son         GSW    No Known Problems Sister         Allergies:   Bactrim [sulfamethoxazole-trimethoprim] and Adhesive tape     Review of Systems:   Review of Systems   Review of Systems   Constitutional: Negative for activity change. HENT: Negative for congestion. Eyes: Negative for discharge. Respiratory: Negative for apnea. Cardiovascular: Negative for chest pain. Gastrointestinal: Negative for abdominal distention. Endocrine: Negative for cold intolerance. Genitourinary: Negative for dysuria. Musculoskeletal: Negative for arthralgias. Skin: Positive for wound. Negative for color change. Allergic/Immunologic: Negative for immunocompromised state. Neurological: Negative for dizziness. Hematological: Negative for adenopathy. Psychiatric/Behavioral: Negative for agitation. Physical Examination :   Blood pressure (!) 144/60, pulse 98, height 5' 5\" (1.651 m), weight 242 lb (109.8 kg). Physical Exam  Constitutional:       Appearance: She is not ill-appearing. HENT:      Head: Normocephalic and atraumatic.       Nose: Nose normal.      Mouth/Throat:      Mouth: Mucous membranes are moist.   Eyes:      General: No scleral icterus. Pupils: Pupils are equal, round, and reactive to light. Cardiovascular:      Rate and Rhythm: Normal rate and regular rhythm. Heart sounds: Normal heart sounds. No murmur heard. Pulmonary:      Effort: No respiratory distress. Breath sounds: Normal breath sounds. Abdominal:      General: There is no distension. Tenderness: There is no abdominal tenderness. Genitourinary:     Comments: No cullen  Musculoskeletal:         General: No swelling or deformity. Cervical back: Neck supple. No rigidity. Skin:     Coloration: Skin is not jaundiced. Findings: No bruising. Neurological:      Mental Status: She is alert and oriented to person, place, and time. Cranial Nerves: No cranial nerve deficit. Psychiatric:         Mood and Affect: Mood normal.         Thought Content:  Thought content normal.                Medical Decision Making:   I have independently reviewed/ordered the following labs:    CBCwith Differential:   Lab Results   Component Value Date    WBC 7.4 03/15/2022    WBC 6.4 02/08/2022    HGB 10.2 03/15/2022    HGB 7.5 02/08/2022    HCT 33.8 03/15/2022    HCT 24.8 02/08/2022     03/15/2022     02/08/2022    LYMPHOPCT 34 03/15/2022    LYMPHOPCT 27 02/08/2022    MONOPCT 13 03/15/2022    MONOPCT 8 02/08/2022     BMP:  Lab Results   Component Value Date     02/08/2022     02/07/2022    K 4.4 02/08/2022    K 4.6 02/07/2022     02/08/2022     02/07/2022    CO2 22 02/08/2022    CO2 20 02/07/2022    BUN 15 02/08/2022    BUN 21 02/07/2022    CREATININE 1.12 03/15/2022    CREATININE 0.80 02/08/2022    MG 2.0 11/04/2021    MG 1.8 09/15/2021     Hepatic Function Panel:   Lab Results   Component Value Date    PROT 7.2 01/22/2022    PROT 7.8 10/08/2021    LABALBU 4.0 01/22/2022    LABALBU 3.6 10/08/2021    BILIDIR <0.08 10/08/2021    BILIDIR <0.08 09/22/2021    IBILI CANNOT BE CALCULATED 10/08/2021    IBILI CANNOT BE CALCULATED 09/22/2021    BILITOT <0.10 01/22/2022    BILITOT <0.10 10/08/2021    ALKPHOS 136 01/22/2022    ALKPHOS 110 10/08/2021    ALT 14 01/22/2022    ALT 11 10/08/2021    AST 14 01/22/2022    AST 16 10/08/2021     No results found for: RPR  No results found for: HIV  No results found for: [facility]  Lab Results   Component Value Date    MUCUS NOT REPORTED 01/24/2022    RBC 3.41 03/15/2022    TRICHOMONAS NOT REPORTED 01/24/2022    WBC 7.4 03/15/2022    YEAST NOT REPORTED 01/24/2022    TURBIDITY Clear 01/24/2022     Lab Results   Component Value Date    CREATININE 1.12 03/15/2022    GLUCOSE 132 02/08/2022   you for allowing us to participate in the care of this patient. Please call with questions. Oral Felix MD  - Office: [phone]    Please note that this chart was generated using voice recognition Dragon dictation software. Although every effort was made to ensure the accuracy of this automated transcription, some errors in transcription mayhave occurred.

## 2022-03-31 ENCOUNTER — CARE COORDINATION (OUTPATIENT)
Dept: CARE COORDINATION | Age: 57
End: 2022-03-31

## 2022-03-31 NOTE — CARE COORDINATION
Patient called and left a message for the [facility], and requested that the  [facility] return the call. HC contacted the patient and found that she was  doing well. Patient reported that she is very pleased that she has been  able to clean her apartment, and to start some packing. Patient also reported that she has her court hearing on 04/01/22. HC wished the   patient well, and inquired of her plans regarding moving out of her apartment. She stated that she plans to request a continuance for  Her hearing, in hopes of getting an extension to stay in her apartment. HC inquired if the patient's daughter has found housing for the patient. Patient reported that her daughter doesn't want to commit to getting an  apartment until she knows a definite date that the patient will have to leave her apartment.     Plan of Care  [facility] will follow up with patient for results of court hearing

## 2022-04-04 ENCOUNTER — CARE COORDINATION (OUTPATIENT)
Dept: CARE COORDINATION | Age: 57
End: 2022-04-04

## 2022-04-04 NOTE — PATIENT INSTRUCTIONS
Patient Education        Learning About Low Blood Sugar (Hypoglycemia) in Diabetes  What is low blood sugar (hypoglycemia)? Hypoglycemia means that your blood sugar is low and your body (especially your brain) is not getting enough fuel. If you have diabetes, your blood sugar can go too low if you take too much of some diabetes medicines. It can also go too low if you miss a meal. And it can happen if you exercise too hard without eating enough food. Some medicines used to treat other health problems cancause low blood sugar too. What are the symptoms? Symptoms of low blood sugar can start quickly. It may take just 10 to 15 minutes. If you have had diabetes for many years, you may not realize that yourblood sugar is low until it drops very low.  If your blood sugar level drops below 70 (mild low blood sugar), you may feel tired, anxious, dizzy, weak, shaky, or sweaty. You may have a fast heartbeat or blurry vision.  If your blood sugar level continues to drop, your behavior may change. You may feel more irritable. You may find it hard to concentrate or talk. And you may feel unsteady when you stand or walk. You may become too weak or confused to eat something with sugar to raise your blood sugar level.  If your blood sugar level drops very low, you may pass out (lose consciousness). Or you may have a seizure or stroke. If you have symptoms of severe low blood sugar, you need to get medical care right away. If you had a low blood sugar level during the night, you may wake up tired or with a headache. Or you may sweat so much during the night that your pajamas orsheets are damp when you wake up. How is low blood sugar treated? You can treat low blood sugar by eating or drinking something that has 15 grams of carbohydrate. These should be quick-sugar foods.  Check your blood sugar level again 15 minutes after having a quick-sugar food to make sure your levelis getting back to your target range.  Children usually need less than 15 grams of carbohydrate. Check with yourdoctor or diabetes educator for the amount that is right for your child. Here are examples of quick-sugar foods that have 15 grams of carbohydrate:   3 to 4 glucose tablets   1 tablespoon (3 teaspoons) of table sugar   1 tablespoon (3 teaspoons) honey   ½ cup to ¾ cup (4 to 6 ounces) of fruit juice or regular (not diet) soda   Hard candy (such as [employer])  If you have problems with severe low blood sugar, someone else may have to giveyou glucagon. This is a hormone that raises blood sugar levels quickly. How can you prevent low blood sugar? You can take steps to prevent low blood sugar.  Follow your treatment plan. Take your insulin or other diabetes medicine exactly as your doctor prescribed it. Talk with your doctor if you're having low blood sugar often. Your medicine may need to be adjusted if it's causing your low blood sugar.  Check your blood sugar levels often. This helps you find early changes before an emergency happens.  Keep a quick-sugar food with you in case your blood sugar level drops low.  Eat small meals more often so that you don't get too hungry between meals. Don't skip meals.  Balance extra exercise with eating more. Check your blood sugar and learn how it changes after exercise. If your blood sugar stays at a normal level, you may not need to eat after you exercise.  Limit how much alcohol you drink. Alcohol can make low blood sugar go even lower. Don't drink alcohol if you have problems recognizing the early signs of low blood sugar.  Keep a diary of your symptoms. This helps you learn when changes in your body may signal low blood sugar. And keep track of how often you have low blood sugar, including when you last ate and what you ate. This will help you learn what causes your blood sugar to drop.  Learn about diabetes and low blood sugar.  Support groups or a diabetes education center can help you understand how medicines, diet, and exercise affect your blood sugar levels. Since low blood sugar levels can quickly become an emergency, be sure to wear medical alert jewelry, such as a medical alert bracelet. This is to let people know you have diabetes so they can get help for you. You can buy this at most drugstores. And make sure your family, friends, and coworkers know the symptomsof low blood sugar. Teach them what to do to get your sugar level up. Follow-up care is a key part of your treatment and safety. Be sure to make and go to all appointments, and call your doctor if you are having problems. It's also a good idea to know your test results and keep alist of the medicines you take. Where can you learn more? Go to https://LoSopeyungeb.[employer]Visible. org and sign in to your [employer] account. Enter I854 in the happn box to learn more about \"Learning About Low Blood Sugar (Hypoglycemia) in Diabetes. \"     If you do not have an account, please click on the \"Sign Up Now\" link. Current as of: July 28, 2021               Content Version: 13.2  © 0436-2635 [employer]. Care instructions adapted under license by [facility] ([facility]). If you have questions about a medical condition or this instruction, always ask your healthcare professional. [address] any warranty or liability for your use of this information.
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
## 2022-04-05 ENCOUNTER — CARE COORDINATION (OUTPATIENT)
Dept: CARE COORDINATION | Age: 57
End: 2022-04-05

## 2022-04-05 NOTE — CARE COORDINATION
HC attempted to contact patient, there was no answer. HC left a message and contact information for patient,  to return the call.     Plan of Care  [facility]. will attempt to reach the patient

## 2022-04-06 ENCOUNTER — CARE COORDINATION (OUTPATIENT)
Dept: CARE COORDINATION | Age: 57
End: 2022-04-06

## 2022-04-06 NOTE — CARE COORDINATION
[facility]. phoned patient to day to follow up on her social needs. Patient stated that she was just going downstairs to   her groceries from [employer].     Plan of Care  [facility], [facility]. will check back with patient later in the week for other social needs

## 2022-04-07 ENCOUNTER — CARE COORDINATION (OUTPATIENT)
Dept: CARE COORDINATION | Age: 57
End: 2022-04-07

## 2022-04-08 ENCOUNTER — CARE COORDINATION (OUTPATIENT)
Dept: CARE COORDINATION | Age: 57
End: 2022-04-08

## 2022-04-08 NOTE — CARE COORDINATION
HC attempted to contact patient , there was no answer. HC left a message for patient and left contact information for  a return call. Plan of Care  [facility]. will attempt to reach out to patient next week.

## 2022-04-12 ENCOUNTER — CARE COORDINATION (OUTPATIENT)
Dept: CARE COORDINATION | Age: 57
End: 2022-04-12

## 2022-04-12 DIAGNOSIS — G62.9 NEUROPATHY: ICD-10-CM

## 2022-04-12 RX ORDER — PREGABALIN 200 MG/1
200 CAPSULE ORAL 2 TIMES DAILY
Qty: 60 CAPSULE | Refills: 0 | Status: SHIPPED | OUTPATIENT
Start: 2022-04-12 | End: 2022-04-22 | Stop reason: SDUPTHER

## 2022-04-12 NOTE — TELEPHONE ENCOUNTER
E-scribe request for med refills. Please review and e-scribe if applicable. Last Visit Date:  3/15/22  Next Visit Date:  Visit date not found    Hemoglobin A1C (%)   Date Value   01/20/2022 10.0   11/04/2021 11.9 (H)   09/16/2021 14.2 (H)             ( goal A1C is < 7)   Microalb/Crt.  Ratio (mcg/mg creat)   Date Value   10/20/2020 20     LDL Cholesterol (mg/dL)   Date Value   12/30/2020 146 (H)       (goal LDL is <100)   AST (U/L)   Date Value   01/22/2022 14     ALT (U/L)   Date Value   01/22/2022 14     BUN (mg/dL)   Date Value   02/08/2022 15     BP Readings from Last 3 Encounters:   03/28/22 (!) 144/60   03/15/22 109/67   02/24/22 128/74          (goal 120/80)        Patient Active Problem List:     IDDM (insulin dependent diabetes mellitus)     Lupus (HCC)     Post traumatic stress disorder (PTSD)     Acute cystitis with hematuria     Hyperglycemia     Suicidal ideation     Arthritis     Chronic back pain     Acid reflux     History of stroke     Polysubstance abuse ([facility])     Cocaine abuse ([facility])     Chest pain     Acute non-recurrent maxillary sinusitis     Acute respiratory failure with hypercapnia ([facility])     Alcohol use disorder, severe, dependence ([facility])     Asthma exacerbation attacks     Bilateral edema of lower extremity     Bipolar 1 disorder, depressed, severe ([facility])     BMI 34.0-34.9,adult     Cannabis use disorder, severe, dependence ([facility])     Cocaine use disorder, severe, dependence ([facility])     Dizziness     Dyslipidemia     Family history of colon cancer     History of lupus     Homicidal ideation     Hypotension     Neuropathy     Absolute anemia     Recurrent depression ([facility])     Recurrent major depressive disorder, in partial remission ([facility])     Severe recurrent major depression with psychotic features ([facility])     Severe recurrent major depression without psychotic features ([facility])     Upper GI bleed     Vitamin D deficiency     White matter changes     Gastroesophageal reflux disease     Stroke (cerebrum) ([address]     Rib lesion     Diabetes mellitus type 2 in obese ([facility])     YAEL (generalized anxiety disorder)     Posttraumatic stress disorder     Suicidal intent     Leg weakness, bilateral     Poorly controlled diabetes mellitus ([facility])     Felon of finger     Osteomyelitis ([facility])     Recurrent falls     Seasonal allergic rhinitis     Fibromyalgia     Tenosynovitis of finger     Open wound of right index finger     Adverse effect of COVID-19 vaccine     Wound dehiscence     Amputation finger     Abscess of right index finger     Type 2 diabetes mellitus without complication, without long-term current use of insulin ([facility])     Major depression     Right foot infection     Cellulitis and abscess of toe of right foot     Abscess of right foot     Bilateral cellulitis of lower leg related to related to  uncontrolled diabetes     Bipolar disorder, current episode mixed, unspecified ([address]     Right foot ulcer, with fat layer exposed ([address]     Ulcer of left foot, with fat layer exposed ([address]     CRP elevated     Status post incision and drainage     Intractable headache      ----Tao Chand

## 2022-04-12 NOTE — TELEPHONE ENCOUNTER
----- Message from Frederic Cardoza sent at 4/12/2022 12:21 PM EDT -----  Subject: Refill Request    QUESTIONS  Name of Medication? pregabalin (LYRICA) 200 MG capsule  Patient-reported dosage and instructions? Take 1 capsule by mouth 2 times   daily for 30 days. How many days do you have left? 0  Preferred Pharmacy? [address] phone number (if available)? [phone]  ---------------------------------------------------------------------------  --------------,  Name of Medication? HYDROcodone-acetaminophen (NORCO) 5-325 MG per tablet  Patient-reported dosage and instructions? Take 1 tablet by mouth as needed   for Pain for up to 14 days. How many days do you have left? 0  Preferred Pharmacy? [address] phone number (if available)? [phone]  ---------------------------------------------------------------------------  --------------,  Name of Medication? ibuprofen (ADVIL;MOTRIN) 800 MG tablet  Patient-reported dosage and instructions? Take 1 tablet by mouth every 8   hours as needed for Pain  How many days do you have left? 0  Preferred Pharmacy? [address] phone number (if available)? [phone]  ---------------------------------------------------------------------------  --------------  CALL BACK INFO  What is the best way for the office to contact you? OK to leave message on   voicemail  Preferred Call Back Phone Number? [phone]  ---------------------------------------------------------------------------  --------------  SCRIPT ANSWERS  Relationship to Patient?  Self

## 2022-04-12 NOTE — CARE COORDINATION
HC attempted to contact patient. There was no answer, per [place], patient's phone isn't working at this time.     Plan of Care  [facility]. will attempt to reach out to patient

## 2022-04-15 ENCOUNTER — CARE COORDINATION (OUTPATIENT)
Dept: CARE COORDINATION | Age: 57
End: 2022-04-15

## 2022-04-18 ENCOUNTER — TELEPHONE (OUTPATIENT)
Dept: FAMILY MEDICINE CLINIC | Age: 57
End: 2022-04-18

## 2022-04-18 ENCOUNTER — TELEPHONE (OUTPATIENT)
Dept: OTHER | Age: 57
End: 2022-04-18

## 2022-04-18 ENCOUNTER — CARE COORDINATION (OUTPATIENT)
Dept: CARE COORDINATION | Age: 57
End: 2022-04-18

## 2022-04-18 NOTE — TELEPHONE ENCOUNTER
Dr Katie Oreilly paged via [employer] for: Ammy Montero 1965 Please call pt at [phone] re: very depressed, states this is a matter of life and death. She is reluctant to give me details but states that something bad has happened. Message read at 5\"05pm. This patient called at 4:45pm and stated she needed to speak to K Pool. Writer did repeat the provider's name to patient and she verified it. Pt is reluctant -to give information and stated it is a matter of life and death. She did state she was depressed and she was crying. Writer asked if she felt she wanted to hurt herself and she stated NO. There was another -person in the background speaking with her that writer heard. As writer was connecting caller to Lumi Shanghai, on call, caller hung up. Writer tried numerous times to reach pt and call would not go through to [phone]. Writer pulled up chart in EPIC and noticed that this call was cold transferred from another department and another phone number for pt was listed. Lumi Shanghai also tried to reach out to pt and she talked with Travefy Alex, partner, and they have never seen pt. Writer called [phone] and reached pt. Dr clarified with pt to be Family Medicine at [facility] and Dr Katie Oreilly paged as above. I called pt back to check on her is she stated she was doing better and it was re: her daughter. She stated Dr Jamari Romero called her but the phone kept disconnecting. Writer explained if she needed immediate help to call 911 now, or go to nearest ED for help. Pt stated again that she was doing better now. JULIAN.

## 2022-04-18 NOTE — TELEPHONE ENCOUNTER
Writer received [employer] in regards to patient Raúl Taylor. Message was sent by Estefania Taveras which read \" very depressed, states this is a matter of life and death. She is reluctant to give me details but states that something bad has happened. Thank you! \"      Resident instructed Audie Osborne from [employer] to immediately call 911 and that sending a health Link while someone reports that a matter is life and death is unacceptable. Resident spoke with patient who states that her daughter is suicidal.  Patient states that her daughter reported that she is going to kill her mother before killing herself. Currently, the daughter is not in the house. Patient states that she is scared for safety. When questioned why she did not call 911 patient cannot provide a direct answer. At this time, resident will call 911 due to concerns of patient safety and well-being. Of note, resident was on the phone with the patient which was cut off twice. Unknown why phone keeps cutting off. Spoke with . He documented the encounter and took the patient's name, , address, and number.  states they will send someone to the house and attempt to call patient.     Electronically signed by Therese Ruiz MD on 2022 at 5:27 PM

## 2022-04-18 NOTE — CARE COORDINATION
HC attempted to reach patient, there was no answer. HC was unable to leave a message for patient , it appears that her phone isn't working at this time. Plan of Care  [facility]. will attempt to reach out to patient in a few days.

## 2022-04-18 NOTE — TELEPHONE ENCOUNTER
----- Message from Tom Nixon sent at 4/18/2022  4:14 PM EDT -----  Subject: Message to Provider    QUESTIONS  Information for Provider? Pt would like to speak to Dr. Saleem Rack It is   URGENT and about life and death.   ---------------------------------------------------------------------------  --------------  CALL BACK INFO  What is the best way for the office to contact you? OK to leave message on   voicemail  Preferred Call Back Phone Number? [phone]  ---------------------------------------------------------------------------  --------------  SCRIPT ANSWERS  Relationship to Patient?  Self

## 2022-04-19 ENCOUNTER — CARE COORDINATION (OUTPATIENT)
Dept: CARE COORDINATION | Age: 57
End: 2022-04-19

## 2022-04-19 ENCOUNTER — TELEPHONE (OUTPATIENT)
Dept: FAMILY MEDICINE CLINIC | Age: 57
End: 2022-04-19

## 2022-04-19 ENCOUNTER — SOCIAL WORK (OUTPATIENT)
Dept: FAMILY MEDICINE CLINIC | Age: 57
End: 2022-04-19

## 2022-04-19 NOTE — CARE COORDINATION
Writer sent to pt house for a safety check. Pt called her PCP office yesterday, told them her daughter was having SI and HI, attempts by staff members at PCP office to reach pt were unsuccessful. Pt answered door and pt explained that she lost her phone and the one she has keeps hanging up when people called. Pt apartment in disarray, pt tearful and wanting to speak to her care coordinator Mark Muniz and  Ange MAXWELL Writer obtained Mikayla's phone number from [facility] and let pt use her phone to speak with Mark Muniz. Pt very tearful on phone,speaking of her blood sugars being low, writer wanted pt to check b.s. but we were unable to find glucometer. Pt appeared to be under the influence of something, chore boy scrubbers and empty plastic baggies visible to writer. Pt calmed down by Mark Muniz and Leeanne Ryan suggested pt be made an appt with PCP and Psych Dr, pt is hesitant on psychiatriac help but appt with PCP was made, writer to arrange transportation for this appt. Melinda Beltrán will go back to see pt tomorrow to take her the time and date of her appt. Pt stated she is not a danger to herself or to others and that her daughter won't be back and she will be safe. Pt instructed to call [employer] if daughter does show up.
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
## 2022-04-19 NOTE — TELEPHONE ENCOUNTER
----- Message from Janneth Urias sent at 4/19/2022 12:54 PM EDT -----  Subject: Message to Provider    QUESTIONS  Information for Provider? patient was returning a call  ---------------------------------------------------------------------------  --------------  [address]  What is the best way for the office to contact you? Do not leave any   message, patient will call back for answer  Preferred Call Back Phone Number? [phone]  ---------------------------------------------------------------------------  --------------  SCRIPT ANSWERS  Relationship to Patient?  Self

## 2022-04-19 NOTE — PROGRESS NOTES
Received the message this morning stating the patient was concerned for safety and was not recommended. Notes reviewed from Dr. Martin Garay yesterday evening stated that patient was concerned for safety as the daughter said she was wanted to kill herself and her mother. Dr. Lucian Becerra called 911 yesterday attempted to call patient back however the phone kept getting disconnected. Writer tried to call patient again this morning. Tried both numbers on file multiple times with no response. Provider spoke to Gregory Krueger (social work) regarding concern for patient's safety and status. Scottie Butts stated that she will reach out to him as I have them go to her home to check on patient.     Sarah Alvarez MD  Family Medicine Resident

## 2022-04-20 ENCOUNTER — CARE COORDINATION (OUTPATIENT)
Dept: CARE COORDINATION | Age: 57
End: 2022-04-20

## 2022-04-20 NOTE — CARE COORDINATION
Ambulatory Care Coordination Note  4/20/2022  CM Risk Score: 2  Charlson 10 Year Mortality Risk Score: 79%     ACC: Gabriele Cash    Summary Note: Chetan Fitzgerald today is c/o \"my stomach is bothering me\". She has not ate today. She reports \"I don't have an appetite\". She states she has not checked her blood sugar.  asked if she has found her glucometer. She states \"I believe it may be in a bag. I packed a few things in case I needed to go be with somebody\". She then states \"I am OK here\". She states she is willing to see Dr. Anne Abdi. There is no appointment made with Dr. Anne Abdi for Chetan Fitzgerald. Chetan Fitzgerald reports Josue Petit told her she would see her today at noon. She states she has not seen or heard from Josue Petit. She does not know if transportation has been scheduled for her appointment on Friday. Chetan Fitzgerald states she would like to talk to [facility]. Plan:  Meet Chetan Fitzgerald at her appointment on Friday. Reach out to [facility] for assistance with scheduling transportation for Terri's appointment on Friday and let her know Chetan Fitzgerald would like to talk to her. (done)        Care Coordination Interventions    Referral from Primary Care Provider: No  Suggested Interventions and [address] Waiver: In Process  Registered Dietician: Completed  Senior Services: Not Started  Social Work: Completed  Transportation Support: Completed         Goals Addressed    None         Prior to Admission medications    Medication Sig Start Date End Date Taking? Authorizing Provider   pregabalin (LYRICA) 200 MG capsule Take 1 capsule by mouth 2 times daily for 30 days.  4/12/22 5/12/22  Stephan Richmond MD   cephALEXin (KEFLEX) 500 MG capsule Take 1 capsule by mouth 4 times daily 3/28/22 6/26/22  Maylin Soares MD   acetaminophen (TYLENOL) 500 MG tablet Take 2 tablets by mouth 3 times daily as needed for Pain 3/15/22   Dom Mcnamara MD   insulin glargine (LANTUS SOLOSTAR) 100 UNIT/ML injection pen Inject 30 Units into the skin 2 times daily 3/15/22   Jessica Jacobo MD   fluticasone (FLONASE) 50 MCG/ACT nasal spray 1 spray by Each Nostril route daily 3/15/22   Jessica Jacobo MD   metFORMIN (GLUCOPHAGE) 1000 MG tablet Take 1 tablet by mouth 2 times daily (with meals) 3/15/22   Jessica Jacobo MD   mirtazapine (REMERON) 7.5 MG tablet Take 1 tablet by mouth nightly 3/15/22   Jessica Jacobo MD   pantoprazole (PROTONIX) 20 MG tablet Take 1 tablet by mouth 2 times daily (before meals) 3/15/22   Jessica Jacobo MD   traZODone (DESYREL) 150 MG tablet Take 1 tablet by mouth nightly 3/15/22   Jessica Jacobo MD   venlafaxine (EFFEXOR XR) 150 MG extended release capsule Take 1 capsule by mouth daily (with breakfast) 3/15/22   Jessica Jacobo MD   Alcohol Swabs 70 % PADS Use 1 swab 3 times daily as needed 3/15/22   Jessica Jacobo MD   blood glucose monitor strips Test 3 times a day & as needed for symptoms of irregular blood glucose. Dispense sufficient amount for indicated testing frequency plus additional to accommodate PRN testing needs. 3/15/22   Jessica Jacobo MD   Lancets MISC 1 each by Does not apply route 3 times daily 3/15/22   Jessica Jacobo MD   Insulin Pen Needle ([employer] PEN NEEDLES 31G) 31G X 8 MM MISC 1 each by Does not apply route daily 3/15/22   Jessica Jacobo MD   FREESTYLE LITE strip 1 each by Does not apply route 3 times daily 3/15/22   Jessica Jacobo MD   budesonide-formoterol (SYMBICORT) 80-4.5 MCG/ACT AERO Inhale 2 puffs into the lungs 2 times daily 3/15/22   Jessica Jacobo MD   albuterol sulfate  (90 Base) MCG/ACT inhaler Inhale 2 puffs into the lungs every 4 hours as needed for Wheezing 3/15/22 4/15/22  Jessica Jacobo MD   mupirocin (BACTROBAN) 2 % ointment Apply topically 3 times daily Apply topically to right foot wound two times a day for wound care. Apply to right foot wound, cover with Kerlix and ace wrap. Historical Provider, MD   Misc.  Devices MISC 1 PAIR OF DIABETIC SHOES (1 LEFT/ 1 RIGHT)  1-3 PAIRS OF INSERTS (LEFT/ RIGHT) 2/15/22   Rosemary Duke DPM   dicyclomine (BENTYL) 10 MG capsule Take 1 capsule by mouth 4 times daily 12/17/21   Andrey Knutson MD   ibuprofen (ADVIL;MOTRIN) 800 MG tablet Take 1 tablet by mouth every 8 hours as needed for Pain 11/15/21 11/29/21  Gino Barriga MD   FLOVENT  MCG/ACT inhaler Inhale 2 puffs into the lungs 2 times daily 10/20/20   Andrey Knutson MD       Future Appointments   Date Time Provider [facility]   4/22/2022  1:30 PM Andrey Knutson MD [address] [facility]   5/23/2022  3:30 PM Acosta Burnett MD INFT DISEASE [facility]

## 2022-04-21 ENCOUNTER — CARE COORDINATION (OUTPATIENT)
Dept: CARE COORDINATION | Age: 57
End: 2022-04-21

## 2022-04-21 NOTE — CARE COORDINATION
arranged for patients transportation for 04/22/22 to [facility] for a 1:30p appointment with her  PCP. This information was shared with the patient, she stated that she saw it come up on   her phone.     Plan of Care  [facility]. will follow up with patient on 04/21/22 regarding food resources

## 2022-04-21 NOTE — CARE COORDINATION
HC attempted to contact this patient. There was no answer. HC was unable to leave a message due to the voicemail being   Full.     Plan of Care  [facility]. will attempt to check in with patient on 04/22/22

## 2022-04-22 ENCOUNTER — OFFICE VISIT (OUTPATIENT)
Dept: FAMILY MEDICINE CLINIC | Age: 57
End: 2022-04-22
Payer: COMMERCIAL

## 2022-04-22 ENCOUNTER — CARE COORDINATION (OUTPATIENT)
Dept: CARE COORDINATION | Age: 57
End: 2022-04-22

## 2022-04-22 VITALS
BODY MASS INDEX: 39.32 KG/M2 | TEMPERATURE: 98.7 F | DIASTOLIC BLOOD PRESSURE: 89 MMHG | HEIGHT: 65 IN | WEIGHT: 236 LBS | HEART RATE: 90 BPM | SYSTOLIC BLOOD PRESSURE: 137 MMHG

## 2022-04-22 DIAGNOSIS — G62.9 NEUROPATHY: ICD-10-CM

## 2022-04-22 DIAGNOSIS — E66.9 DIABETES MELLITUS TYPE 2 IN OBESE (HCC): Primary | ICD-10-CM

## 2022-04-22 DIAGNOSIS — E78.49 OTHER HYPERLIPIDEMIA: ICD-10-CM

## 2022-04-22 DIAGNOSIS — E11.69 DIABETES MELLITUS TYPE 2 IN OBESE (HCC): Primary | ICD-10-CM

## 2022-04-22 LAB — HBA1C MFR BLD: 14 %

## 2022-04-22 PROCEDURE — G8427 DOCREV CUR MEDS BY ELIG CLIN: HCPCS | Performed by: STUDENT IN AN ORGANIZED HEALTH CARE EDUCATION/TRAINING PROGRAM

## 2022-04-22 PROCEDURE — 1036F TOBACCO NON-USER: CPT | Performed by: STUDENT IN AN ORGANIZED HEALTH CARE EDUCATION/TRAINING PROGRAM

## 2022-04-22 PROCEDURE — 99213 OFFICE O/P EST LOW 20 MIN: CPT | Performed by: STUDENT IN AN ORGANIZED HEALTH CARE EDUCATION/TRAINING PROGRAM

## 2022-04-22 PROCEDURE — 2022F DILAT RTA XM EVC RTNOPTHY: CPT | Performed by: STUDENT IN AN ORGANIZED HEALTH CARE EDUCATION/TRAINING PROGRAM

## 2022-04-22 PROCEDURE — 83036 HEMOGLOBIN GLYCOSYLATED A1C: CPT | Performed by: STUDENT IN AN ORGANIZED HEALTH CARE EDUCATION/TRAINING PROGRAM

## 2022-04-22 PROCEDURE — G8417 CALC BMI ABV UP PARAM F/U: HCPCS | Performed by: STUDENT IN AN ORGANIZED HEALTH CARE EDUCATION/TRAINING PROGRAM

## 2022-04-22 PROCEDURE — 3046F HEMOGLOBIN A1C LEVEL >9.0%: CPT | Performed by: STUDENT IN AN ORGANIZED HEALTH CARE EDUCATION/TRAINING PROGRAM

## 2022-04-22 PROCEDURE — 3017F COLORECTAL CA SCREEN DOC REV: CPT | Performed by: STUDENT IN AN ORGANIZED HEALTH CARE EDUCATION/TRAINING PROGRAM

## 2022-04-22 RX ORDER — INSULIN GLARGINE 100 [IU]/ML
30 INJECTION, SOLUTION SUBCUTANEOUS 2 TIMES DAILY
Qty: 5 PEN | Refills: 5 | Status: SHIPPED | OUTPATIENT
Start: 2022-04-22

## 2022-04-22 RX ORDER — ATORVASTATIN CALCIUM 40 MG/1
40 TABLET, FILM COATED ORAL NIGHTLY
Qty: 90 TABLET | Refills: 3 | Status: ON HOLD | OUTPATIENT
Start: 2022-04-22 | End: 2022-09-27

## 2022-04-22 RX ORDER — PREGABALIN 200 MG/1
200 CAPSULE ORAL DAILY
Qty: 30 CAPSULE | Refills: 0 | Status: SHIPPED | OUTPATIENT
Start: 2022-04-22 | End: 2022-09-08

## 2022-04-22 ASSESSMENT — ENCOUNTER SYMPTOMS
SORE THROAT: 0
ANAL BLEEDING: 0
SHORTNESS OF BREATH: 0
NAUSEA: 0
SINUS PAIN: 0
WHEEZING: 0
DIARRHEA: 0
ABDOMINAL PAIN: 0
ABDOMINAL DISTENTION: 0
COLOR CHANGE: 0
COUGH: 0
SINUS PRESSURE: 0
CHEST TIGHTNESS: 0

## 2022-04-22 ASSESSMENT — PATIENT HEALTH QUESTIONNAIRE - PHQ9: DEPRESSION UNABLE TO ASSESS: PT REFUSES

## 2022-04-22 NOTE — CARE COORDINATION
Call attempt- unable to leave a message, voicemail box is full. Will attempt to reach patient again within 1-2 weeks.   FRANCESCO Mariee
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
## 2022-04-22 NOTE — PROGRESS NOTES
Visit Information    Have you changed or started any medications since your last visit including any over-the-counter medicines, vitamins, or herbal medicines? no   Have you stopped taking any of your medications? Is so, why? -  no  Are you having any side effects from any of your medications? - no    Have you seen any other physician or provider since your last visit?  no   Have you had any other diagnostic tests since your last visit?  no   Have you been seen in the emergency room and/or had an admission in a hospital since we last saw you?  no   Have you had your routine dental cleaning in the past 6 months?  no     Do you have an active MyChart account? If no, what is the barrier?   No: declined    Patient Care Team:  Sarah Alvarez MD as PCP - General (Family Medicine)  Brandy Gonzáles MD as Consulting Physician (Infectious Diseases)  Nasrin Kaur MD as Consulting Physician (Infectious Diseases)  Cordell Pimentel as Health   Patrice Zambrano as [address] [place] [address]  Raz Roberson RD, FRANCESCO as Dietitian    Medical History Review  Past Medical, Family, and Social History reviewed and does not contribute to the patient presenting condition    Health Maintenance   Topic Date Due    Pneumococcal 0-64 years Vaccine (1 - PCV) Never done    Diabetic retinal exam  Never done    Hepatitis B vaccine (1 of 3 - Risk 3-dose series) Never done    Breast cancer screen  Never done    Shingles Vaccine (1 of 2) Never done    Colorectal Cancer Screen  08/13/2019    Diabetic microalbuminuria test  10/20/2021    Lipids  12/30/2021    A1C test (Diabetic or Prediabetic)  04/20/2022    COVID-19 Vaccine (3 - Booster for [facility] series) 05/22/2022    Flu vaccine (Season Ended) 09/01/2022    Diabetic foot exam  01/22/2023    Depression Monitoring  03/15/2023    Cervical cancer screen  12/22/2026    DTaP/Tdap/Td vaccine (5 - Td or Tdap) 01/22/2032    Hepatitis C screen  Completed    HIV screen  Completed    Hepatitis A vaccine  Aged Out    Hib vaccine  Aged Out    Meningococcal (ACWY) vaccine  Aged Out

## 2022-04-22 NOTE — PATIENT INSTRUCTIONS
Thank you for letting us take care of you today. We hope all your questions were addressed. If a question was overlooked or something else comes to mind after you return home, please contact a member of your Care Team listed below. Your Care Team at [place] [address] is Team #4  Nancy Holloway MD (Faculty)  Ana Muniz MD (Resident)  Becki Boyd MD (Resident)  Rajendra Anderson MD (Resident)  Jeison Woods MD (Resident)  Gm GODFREYDONNA  [place] Kylah [address]., [facility] office)  Danny Gastelum [place] (Clinical Practice Manager)  Jai Pires [facility] (Clinical Pharmacist)       Office phone number: [phone]    If you need to get in right away due to illness, please be advised we have \"Same Day\" appointments available Monday-Friday. Please call us at [phone] option #3 to schedule your \"Same Day\" appointment.

## 2022-04-22 NOTE — PROGRESS NOTES
Attending Physician Statement  I have discussed the care of Kin Hanson 49 y.o. female, including pertinent history and exam findings, with the resident Dr. Yuridia St MD.    History and Exam: No chief complaint on file.       Past Medical History:   Diagnosis Date    Acid reflux 5/29/2017    Acute cystitis with hematuria 10/11/2016    Acute non-recurrent maxillary sinusitis 11/28/2017    Asthma     Bipolar 1 disorder ([address] 1/4/2018    Bipolar disorder, mixed ([address]     BMI 34.0-34.9,adult 11/28/2017    Cannabis use disorder, severe, dependence ([address] 12/19/2017    Cerebrovascular accident (CVA) ([address] 6/14/2017    Chest pain 11/5/2016    Chronic renal insufficiency     Cocaine abuse ([address] 1/5/2018    COVID-19 virus RNA test result unknown     DDD (degenerative disc disease), cervical     Diabetes mellitus ([address]     Dizziness 6/13/2017    Fibromyalgia     History of stroke 9/9/2017    Homicidal ideation 11/6/2017    Hyperglycemia     Hypertension     Hypotension 1/18/2019    IDDM (insulin dependent diabetes mellitus) 12/21/2015    Lupus ([address]     Migraine     Neuropathy     Neuropathy     Polysubstance abuse ([address] 9/17/2017    Post traumatic stress disorder (PTSD)     Posttraumatic stress disorder 5/29/2017    Recurrent depression ([address] 5/29/2017    Recurrent major depressive disorder, in partial remission ([address] 11/28/2017    Screening mammogram, encounter for 11/28/2017    Severe recurrent major depression with psychotic features ([address] 12/19/2017    Severe recurrent major depression without psychotic features ([address] 12/19/2017    Stroke (cerebrum) ([address] 6/14/2017    Stroke ([address]     per chart notes    Suicidal ideation     Suicidal intent 3/10/2017    Vitamin D deficiency 11/28/2017    White matter changes 6/13/2017     Allergies   Allergen Reactions    Bactrim [Sulfamethoxazole-Trimethoprim] Swelling    Adhesive Tape Rash      I have seen and examined the patient and the key elements of the encounter have been performed by me. BP Readings from Last 3 Encounters:   04/22/22 137/89   03/28/22 (!) 144/60   03/15/22 109/67     LMP  (LMP Unknown)   Lab Results   Component Value Date    WBC 7.4 03/15/2022    HGB 10.2 (L) 03/15/2022    HCT 33.8 (L) 03/15/2022     03/15/2022    CHOL 275 (H) 12/30/2020    TRIG 246 (H) 12/30/2020    HDL 80 12/30/2020    ALT 14 01/22/2022    AST 14 01/22/2022     02/08/2022    K 4.4 02/08/2022     02/08/2022    CREATININE 1.12 (H) 03/15/2022    BUN 15 02/08/2022    CO2 22 02/08/2022    TSH 0.82 09/16/2021    INR 1.0 01/24/2022    LABA1C 14.0 04/22/2022    LABMICR 20 10/20/2020     Lab Results   Component Value Date    LABALBU 4.0 01/22/2022     Lab Results   Component Value Date    IRON 42 03/15/2022    TIBC 309 03/15/2022    FERRITIN 42 03/15/2022     Lab Results   Component Value Date    LDLCHOLESTEROL 146 (H) 12/30/2020     I agree with the assessment, plan and the diagnosis of   I agree with orders as documented by the resident. More than 25 minutes spent  in face to face encounter with the patient and more than half in counseling. Patient's questions were answered. Patient Voiced understanding to the counseling.   (GC Modifier)-Dr. Azam Cuba MD

## 2022-04-22 NOTE — PROGRESS NOTES
Attending Physician Statement  I have discussed the care of Frances Alvarez 49 y.o. female, including pertinent history and exam findings, with the resident Dr. Aiden Elam MD.    History and Exam:   Chief Complaint   Patient presents with   [address] [place] [address] Diabetes    Health Maintenance     patient refused vaccines       Past Medical History:   Diagnosis Date    Acid reflux 5/29/2017    Acute cystitis with hematuria 10/11/2016    Acute non-recurrent maxillary sinusitis 11/28/2017    Asthma     Bipolar 1 disorder ([address] 1/4/2018    Bipolar disorder, mixed ([address]     BMI 34.0-34.9,adult 11/28/2017    Cannabis use disorder, severe, dependence ([address] 12/19/2017    Cerebrovascular accident (CVA) ([address] 6/14/2017    Chest pain 11/5/2016    Chronic renal insufficiency     Cocaine abuse ([address] 1/5/2018    COVID-19 virus RNA test result unknown     DDD (degenerative disc disease), cervical     Diabetes mellitus ([address]     Dizziness 6/13/2017    Fibromyalgia     History of stroke 9/9/2017    Homicidal ideation 11/6/2017    Hyperglycemia     Hypertension     Hypotension 1/18/2019    IDDM (insulin dependent diabetes mellitus) 12/21/2015    Lupus ([address]     Migraine     Neuropathy     Neuropathy     Polysubstance abuse ([address] 9/17/2017    Post traumatic stress disorder (PTSD)     Posttraumatic stress disorder 5/29/2017    Recurrent depression ([address] 5/29/2017    Recurrent major depressive disorder, in partial remission ([address] 11/28/2017    Screening mammogram, encounter for 11/28/2017    Severe recurrent major depression with psychotic features ([address] 12/19/2017    Severe recurrent major depression without psychotic features ([address] 12/19/2017    Stroke (cerebrum) ([address] 6/14/2017    Stroke ([address]     per chart notes    Suicidal ideation     Suicidal intent 3/10/2017    Vitamin D deficiency 11/28/2017    White matter changes 6/13/2017     Allergies   Allergen Reactions    Bactrim [Sulfamethoxazole-Trimethoprim] Swelling    Adhesive Tape Rash      I have seen and examined the patient and the key elements of the encounter have been performed by me. BP Readings from Last 3 Encounters:   04/22/22 137/89   03/28/22 (!) 144/60   03/15/22 109/67     /89 (Site: Right Upper Arm, Position: Sitting, Cuff Size: Large Adult) Comment: machine  Pulse 90   Temp 98.7 °F (37.1 °C) (Oral)   Ht 5' 5\" (1.651 m)   Wt 236 lb (107 kg)   LMP  (LMP Unknown)   BMI 39.27 kg/m²   Lab Results   Component Value Date    WBC 7.4 03/15/2022    HGB 10.2 (L) 03/15/2022    HCT 33.8 (L) 03/15/2022     03/15/2022    CHOL 275 (H) 12/30/2020    TRIG 246 (H) 12/30/2020    HDL 80 12/30/2020    ALT 14 01/22/2022    AST 14 01/22/2022     02/08/2022    K 4.4 02/08/2022     02/08/2022    CREATININE 1.12 (H) 03/15/2022    BUN 15 02/08/2022    CO2 22 02/08/2022    TSH 0.82 09/16/2021    INR 1.0 01/24/2022    LABA1C 14.0 04/22/2022    LABMICR 20 10/20/2020     Lab Results   Component Value Date    LABALBU 4.0 01/22/2022     Lab Results   Component Value Date    IRON 42 03/15/2022    TIBC 309 03/15/2022    FERRITIN 42 03/15/2022     Lab Results   Component Value Date    LDLCHOLESTEROL 146 (H) 12/30/2020     I agree with the assessment, plan and the diagnosis of    Diagnosis Orders   1. Diabetes mellitus type 2 in obese (HCC)  POCT glycosylated hemoglobin (Hb A1C)    insulin glargine (LANTUS SOLOSTAR) 100 UNIT/ML injection pen   2. Neuropathy  pregabalin (LYRICA) 200 MG capsule   3. Other hyperlipidemia  atorvastatin (LIPITOR) 40 MG tablet    . I agree with orders as documented by the resident. More than 25 minutes spent  in face to face encounter with the patient and more than half in counseling. Patient's questions were answered. Patient Voiced understanding to the counseling. Return in about 3 months (around 7/22/2022).    (GC Modifier)-Dr. Manjinder Gracia MD

## 2022-04-25 ENCOUNTER — CARE COORDINATION (OUTPATIENT)
Dept: CARE COORDINATION | Age: 57
End: 2022-04-25

## 2022-04-25 NOTE — CARE COORDINATION
Ambulatory Care Coordination Note  4/25/2022  CM Risk Score: 2  Charlson 10 Year Mortality Risk Score: 79%     ACC: Lola aDy    Summary Note: Kalli Cole is \"still in bed\" when CM calls. She reports she is not sure what is going to happen today or what she is going to do. Kalli Cole reports \"I am very depressed\". CM encouraged Kalli Cole to check her blood sugar and take her medications as prescribed. CM reinforced that [address] [place] [address] will be delivering her medications today. She will then have all her medications. CM informed Kalli Cole that Selvin White will be joining her care team to assist with some of her social road blocks. Kalli Cole is agreeable to the plan. Plan:  Reach out to PATRICIA Santos to discuss Terri's needs and complete a plan of care. Care Coordination Interventions    Referral from Primary Care Provider: No  Suggested Interventions and Community Resources  Home Care Waiver: In Process  Registered Dietician: Completed  Senior Services: Not Started  Social Work: Completed  Transportation Support: Completed         Goals Addressed    None         Prior to Admission medications    Medication Sig Start Date End Date Taking? Authorizing Provider   pregabalin (LYRICA) 200 MG capsule Take 1 capsule by mouth daily for 30 days.  4/22/22 5/22/22  Darrell Yee MD   insulin glargine (LANTUS SOLOSTAR) 100 UNIT/ML injection pen Inject 30 Units into the skin 2 times daily 4/22/22   Darrell Yee MD   atorvastatin (LIPITOR) 40 MG tablet Take 1 tablet by mouth nightly 4/22/22 7/21/22  Darrell Yee MD   cephALEXin (KEFLEX) 500 MG capsule Take 1 capsule by mouth 4 times daily 3/28/22 6/26/22  Arlette Sherren Spearman, MD   acetaminophen (TYLENOL) 500 MG tablet Take 2 tablets by mouth 3 times daily as needed for Pain 3/15/22   Darrell Yee MD   fluticasone (FLONASE) 50 MCG/ACT nasal spray 1 spray by Each Nostril route daily 3/15/22   Darrell Yee MD   metFORMIN (GLUCOPHAGE) 1000 MG tablet Take 1 tablet by mouth 2 800 MG tablet Take 1 tablet by mouth every 8 hours as needed for Pain 11/15/21 11/29/21  Gilberto Akins MD   FLOVENT  MCG/ACT inhaler Inhale 2 puffs into the lungs 2 times daily 10/20/20   Areli Celeste MD       Future Appointments   Date Time Provider Jj Hernandez   5/23/2022  3:30 PM Acosta Burnett MD INFT DISEASE [facility]

## 2022-04-25 NOTE — CARE COORDINATION
Writer received referral from [address] [place] [address] and reached out to Patient to introduce myself and be of assistance to Patient with her Housing issue. Patient was receptive and agreed to meet Writer and RN Case Manager at [facility] on 04/27/2022 at 1245pm    We will be discussing Housing, Stability and a list of other issues that were brought to this writer's attention. Writer was able to contact [facility] to schedule Transportation for Patient.     Trip Conformation Number is [phone]

## 2022-04-27 ENCOUNTER — CARE COORDINATION (OUTPATIENT)
Dept: CARE COORDINATION | Age: 57
End: 2022-04-27

## 2022-04-27 NOTE — CARE COORDINATION
Writer, Patient and RN Case Manager met at [facility] on today to discuss issues that Patient is going through at the moment. We discussed Her Housing situation and called her Manager at [facility] to see if they were willing to work with her on her [place] and it was reported that Patient is all caught up on [place] until 05/01/2022; But was in Danger of being evicted if she receives another Write-Up as this would put her in violation of her Lease Agreement. We also discussed her Mental Health as Patient has suffered Great Losses in a very short amount of time with Her  Passing and Her Son and Her Daughter; It was encouraged for Patient to see a Psychologist through [facility] but Patient was unsure, We also discussed Patient going into Treatment to address her HX of Substance Abuse, But Patient did not want to go to a Facility in [place] as she said she did before and bumped into people she knew from the streets and it was counterproductive. It was conveyed to Patient that we are here to help and she should do whatever she can to remain at [facility] until she has a plan on what she wants to do when it comes to housing, Patient said she has been thinking about Moving to [place] but was unsure. Writer will continue to aid Patient however I can with her Social service needs.

## 2022-04-27 NOTE — CARE COORDINATION
Ambulatory Care Coordination Note  4/27/2022  CM Risk Score: 2  Charlson 10 Year Mortality Risk Score: 79%     ACC: Maia Morin    Summary Note: Cm met with Satya Nances and PATRICIA Britt on the [facility] to discuss her housing and her future plans. Satya Decker reported she pawned her ring to obtain the cash for her rent. She reports she had gave her cash to her daughter for safe keeping while she was in the hospital and SNF. She reports her daughter has refused to give her the money and did not pay her rent with it. TC placed to Alexus Mcmullen manager at the [place]. Afshin Sanchez reports Juan Ramons rent is paid to date. She states she will owe rent in the amount of $450 on 5.1.22. Afshin Sanchez was asked if Satya Decker was in good standing at the facility. Afshin Sanchez responded financially she is. She states she is not in good standing with her behavior. Afshin Sanchez noted if there is one more incident  d/t bad behavior that Satya Decker will be given an eviction notice to move out in 3 days. Afshin Wassermans reports this order will be turned in to the court. Satya Decker does not wish to have a payee. Satya Decker does not wish to go to [place].   GORDON stressed with Satya Decker the importance of not engaging with anyone at the facility that is confrontational. She expressed an understanding. GORDON stressed the difficulty finding a secure place to live in [place]. Satya Decker reported she would like to move out of [place]. She states she is thinking about moving to [place]. Satya Decker is not checking her blood sugars and did not take her insulin this morning. She did not have lunch or anything to drink. She reports she had been waiting since 11 AM at the campus. GORDON purchased her some chicken salad and water from the cafeteria. Satya Decker was grateful. Satya Decker lost her son, her daughter and her  all within a short time frame. She is tearful speaking of this today. CM stressed the importance of speaking to someone about her grief. Satya Decker was not ready to agree to an appointment with Dr. Linda Pacheco.  Satya Decker recited scripture and referred to God many times during our meeting. Plan:  F/U on Terri's blood sugars on Friday. [place] Dawn again to agree to seeing Dr. Randy Roche. Care Coordination Interventions    Referral from Primary Care Provider: No  Suggested Interventions and Community Resources  Home Care Waiver: In Process  Registered Dietician: Completed  Senior Services: Not Started  Social Work: Completed  Transportation Support: Completed         Goals Addressed    None         Prior to Admission medications    Medication Sig Start Date End Date Taking? Authorizing Provider   pregabalin (LYRICA) 200 MG capsule Take 1 capsule by mouth daily for 30 days.  4/22/22 5/22/22  Ever Aleman MD   insulin glargine (LANTUS SOLOSTAR) 100 UNIT/ML injection pen Inject 30 Units into the skin 2 times daily 4/22/22   Ever Aleman MD   atorvastatin (LIPITOR) 40 MG tablet Take 1 tablet by mouth nightly 4/22/22 7/21/22  Ever Aleman MD   cephALEXin (KEFLEX) 500 MG capsule Take 1 capsule by mouth 4 times daily 3/28/22 6/26/22  Maylin Meyer MD   acetaminophen (TYLENOL) 500 MG tablet Take 2 tablets by mouth 3 times daily as needed for Pain 3/15/22   Ever Aleman MD   fluticasone (FLONASE) 50 MCG/ACT nasal spray 1 spray by Each Nostril route daily 3/15/22   Ever Aleman MD   metFORMIN (GLUCOPHAGE) 1000 MG tablet Take 1 tablet by mouth 2 times daily (with meals) 3/15/22   Ever Aleman MD   mirtazapine (REMERON) 7.5 MG tablet Take 1 tablet by mouth nightly 3/15/22   Ever Aleman MD   pantoprazole (PROTONIX) 20 MG tablet Take 1 tablet by mouth 2 times daily (before meals) 3/15/22   Ever Aleman MD   traZODone (DESYREL) 150 MG tablet Take 1 tablet by mouth nightly 3/15/22   Ever Aleman MD   venlafaxine (EFFEXOR XR) 150 MG extended release capsule Take 1 capsule by mouth daily (with breakfast) 3/15/22   Ever Aleman MD   Alcohol Swabs 70 % PADS Use 1 swab 3 times daily as needed 3/15/22   Areli Celeste MD   blood glucose monitor strips Test 3 times a day & as needed for symptoms of irregular blood glucose. Dispense sufficient amount for indicated testing frequency plus additional to accommodate PRN testing needs. 3/15/22   Areli Celeste MD   Lancets MISC 1 each by Does not apply route 3 times daily 3/15/22   Areli Celeste MD   Insulin Pen Needle ([employer] PEN NEEDLES 31G) 31G X 8 MM MISC 1 each by Does not apply route daily 3/15/22   Areli Celeste MD   FREESTYLE LITE strip 1 each by Does not apply route 3 times daily 3/15/22   Areli Celeste MD   budesonide-formoterol (SYMBICORT) 80-4.5 MCG/ACT AERO Inhale 2 puffs into the lungs 2 times daily 3/15/22   Areli Celeste MD   albuterol sulfate  (90 Base) MCG/ACT inhaler Inhale 2 puffs into the lungs every 4 hours as needed for Wheezing 3/15/22 4/15/22  Areli Celeste MD   mupirocin (BACTROBAN) 2 % ointment Apply topically 3 times daily Apply topically to right foot wound two times a day for wound care. Apply to right foot wound, cover with Kerlix and ace wrap. Historical Provider, MD   Misc.  Devices MISC 1 PAIR OF DIABETIC SHOES (1 LEFT/ 1 RIGHT)  1-3 PAIRS OF INSERTS (LEFT/ RIGHT) 2/15/22   Rosemary Duke DPM   dicyclomine (BENTYL) 10 MG capsule Take 1 capsule by mouth 4 times daily 12/17/21   Areli Celeste MD   ibuprofen (ADVIL;MOTRIN) 800 MG tablet Take 1 tablet by mouth every 8 hours as needed for Pain 11/15/21 11/29/21  Gino Barriga MD   FLOVENT  MCG/ACT inhaler Inhale 2 puffs into the lungs 2 times daily 10/20/20   Areli Celeste MD       Future Appointments   Date Time Provider Jj Hernandez   5/23/2022  3:30 PM Acosta Burnett MD INFT DISEASE [facility]

## 2022-04-28 ENCOUNTER — CARE COORDINATION (OUTPATIENT)
Dept: CARE COORDINATION | Age: 57
End: 2022-04-28

## 2022-04-28 ASSESSMENT — SOCIAL DETERMINANTS OF HEALTH (SDOH)
HOW OFTEN DO YOU ATTENT MEETINGS OF THE CLUB OR ORGANIZATION YOU BELONG TO?: NEVER
DO YOU BELONG TO ANY CLUBS OR ORGANIZATIONS SUCH AS CHURCH GROUPS UNIONS, FRATERNAL OR ATHLETIC GROUPS, OR SCHOOL GROUPS?: NO
IN A TYPICAL WEEK, HOW MANY TIMES DO YOU TALK ON THE PHONE WITH FAMILY, FRIENDS, OR NEIGHBORS?: ONCE A WEEK

## 2022-04-28 NOTE — CARE COORDINATION
Ambulatory Care Coordination Note  4/28/2022  CM Risk Score: 2  Charlson 10 Year Mortality Risk Score: 79%     ACC: Madelyn Loser    Summary Note: Hunter Watkins calls to say thank you for our face to face meeting yesterday. She would like  to schedule her to see Dr. Arabella Haley. She has not checked her blood sugar. She reports she has a new glucometer and does not know how to use it. She is agreeable to go to the office for teaching with medication management. Plan:  Reach out to  front office to schedule Hunter Watkins an appt with Dr. Arabella Haley. (done)  Reach out to medication management to see if they are available for glucometer teaching.(done)  Watch for appointments, Hunter Watkins will need transportation scheduled. Care Coordination Interventions    Referral from Primary Care Provider: No  Suggested Interventions and Community Resources  Home Care Waiver: In Process  Registered Dietician: Completed  Senior Services: Not Started  Social Work: Completed  Transportation Support: Completed         Goals Addressed    None         Prior to Admission medications    Medication Sig Start Date End Date Taking? Authorizing Provider   pregabalin (LYRICA) 200 MG capsule Take 1 capsule by mouth daily for 30 days.  4/22/22 5/22/22  Emigdio Euceda MD   insulin glargine (LANTUS SOLOSTAR) 100 UNIT/ML injection pen Inject 30 Units into the skin 2 times daily 4/22/22   Emigdio Euceda MD   atorvastatin (LIPITOR) 40 MG tablet Take 1 tablet by mouth nightly 4/22/22 7/21/22  Emigdio Euceda MD   cephALEXin (KEFLEX) 500 MG capsule Take 1 capsule by mouth 4 times daily 3/28/22 6/26/22  Maylin Elizabeth MD   acetaminophen (TYLENOL) 500 MG tablet Take 2 tablets by mouth 3 times daily as needed for Pain 3/15/22   Emigdio Euceda MD   fluticasone (FLONASE) 50 MCG/ACT nasal spray 1 spray by Each Nostril route daily 3/15/22   Emigdio Euceda MD   metFORMIN (GLUCOPHAGE) 1000 MG tablet Take 1 tablet by mouth 2 times daily (with meals) 3/15/22 Jayson Purdy MD   mirtazapine (REMERON) 7.5 MG tablet Take 1 tablet by mouth nightly 3/15/22   Jayson Purdy MD   pantoprazole (PROTONIX) 20 MG tablet Take 1 tablet by mouth 2 times daily (before meals) 3/15/22   Jayson Purdy MD   traZODone (DESYREL) 150 MG tablet Take 1 tablet by mouth nightly 3/15/22   Jayson Purdy MD   venlafaxine (EFFEXOR XR) 150 MG extended release capsule Take 1 capsule by mouth daily (with breakfast) 3/15/22   Jayson Purdy MD   Alcohol Swabs 70 % PADS Use 1 swab 3 times daily as needed 3/15/22   Jayson Purdy MD   blood glucose monitor strips Test 3 times a day & as needed for symptoms of irregular blood glucose. Dispense sufficient amount for indicated testing frequency plus additional to accommodate PRN testing needs. 3/15/22   Jayson Purdy MD   Lancets MISC 1 each by Does not apply route 3 times daily 3/15/22   Jayson Purdy MD   Insulin Pen Needle ([employer] PEN NEEDLES 31G) 31G X 8 MM MISC 1 each by Does not apply route daily 3/15/22   Jayson Purdy MD   FREESTYLE LITE strip 1 each by Does not apply route 3 times daily 3/15/22   Jayson Purdy MD   budesonide-formoterol (SYMBICORT) 80-4.5 MCG/ACT AERO Inhale 2 puffs into the lungs 2 times daily 3/15/22   Jayson Purdy MD   albuterol sulfate  (90 Base) MCG/ACT inhaler Inhale 2 puffs into the lungs every 4 hours as needed for Wheezing 3/15/22 4/15/22  Jayson Purdy MD   mupirocin (BACTROBAN) 2 % ointment Apply topically 3 times daily Apply topically to right foot wound two times a day for wound care. Apply to right foot wound, cover with Kerlix and ace wrap. Historical Provider, MD [place].  Devices MIS 1 PAIR OF DIABETIC SHOES (1 LEFT/ 1 RIGHT)  1-3 PAIRS OF INSERTS (LEFT/ RIGHT) 2/15/22   [address] [place] [address], [facility]   dicyclomine (BENTYL) 10 MG capsule Take 1 capsule by mouth 4 times daily 12/17/21   Jayson Purdy MD   ibuprofen (ADVIL;MOTRIN) 800 MG tablet Take 1 tablet by mouth every 8 hours as needed for Pain 11/15/21 11/29/21  Melissa Castillo MD   FLOVENT  MCG/ACT inhaler Inhale 2 puffs into the lungs 2 times daily 10/20/20   Tony Jacobo MD       Future Appointments   Date Time Provider Jj Hernandez   5/23/2022  3:30 PM Robbin Angel MD INFT DISEASE [facility]

## 2022-04-29 ENCOUNTER — CARE COORDINATION (OUTPATIENT)
Dept: CARE COORDINATION | Age: 57
End: 2022-04-29

## 2022-04-29 NOTE — CARE COORDINATION
left again today with Hunter Watkins requesting a return call to [phone] to notify her of her upcoming appointment with Dr. Arabella Haley. Plan:   will reach out to [facility] to schedule transportation once Hunter Watkins returns a call to .

## 2022-05-02 ENCOUNTER — CARE COORDINATION (OUTPATIENT)
Dept: CARE COORDINATION | Age: 57
End: 2022-05-02

## 2022-05-03 ENCOUNTER — CARE COORDINATION (OUTPATIENT)
Dept: CARE COORDINATION | Age: 57
End: 2022-05-03

## 2022-05-03 NOTE — CARE COORDINATION
Ambulatory Care Coordination Note  5/3/2022  CM Risk Score: 2  Charlson 10 Year Mortality Risk Score: 79%     ACC: Brittany Grace    Summary Note: Terri answers the phone today. She reports she has had her phone off. She states she paid her rent for May, $450. She states her daughter took the rest of er money out of her account. She did not close out the account her daughter has access to. She is loud and angry on the phone. She states she needs groceries from the [employer] but does not have the phone number. GORDON stressed the need to not let her anger affect her standing at the [facility]. CM encouraged her to not engage with the tenants unless she can control her anger. She continues to speak about getting out of [place]. She states her glucometer is working. When GORDON asked her what her blood sugar was she stated \"I don't feel like reading right now\". It is questionable if she is checking her blood sugar. GORDON updated Jose Ulrich with the date and time of her appointment with Dr. Randy Roche. She called GORDON last week requesting CM get her an appointment with Dr. Randy Roche. She is scheduled on 5.10.22 at 9:30 AM.  Plan:  Reach out to [facility] for assistance with scheduling transportation and to supply Jose Ulrich with the phone number for [employer]. Call Jose Ulrich again on Thursday. Care Coordination Interventions    Referral from Primary Care Provider: No  Suggested Interventions and Community Resources  Home Care Waiver: In Process  Registered Dietician: Completed  Senior Services: Not Started  Social Work: Completed  Transportation Support: Completed         Goals Addressed    None         Prior to Admission medications    Medication Sig Start Date End Date Taking? Authorizing Provider   pregabalin (LYRICA) 200 MG capsule Take 1 capsule by mouth daily for 30 days.  4/22/22 5/22/22  Ever Aleman MD   insulin glargine (LANTUS SOLOSTAR) 100 UNIT/ML injection pen Inject 30 Units into the skin 2 times daily 4/22/22   Puma Jamil Chandler MD   atorvastatin (LIPITOR) 40 MG tablet Take 1 tablet by mouth nightly 4/22/22 7/21/22  Nya Merino MD   cephALEXin (KEFLEX) 500 MG capsule Take 1 capsule by mouth 4 times daily 3/28/22 6/26/22  Maylin Zendejas MD   acetaminophen (TYLENOL) 500 MG tablet Take 2 tablets by mouth 3 times daily as needed for Pain 3/15/22   Nya Merino MD   fluticasone (FLONASE) 50 MCG/ACT nasal spray 1 spray by Each Nostril route daily 3/15/22   Nya Merino MD   metFORMIN (GLUCOPHAGE) 1000 MG tablet Take 1 tablet by mouth 2 times daily (with meals) 3/15/22   Nya Merino MD   mirtazapine (REMERON) 7.5 MG tablet Take 1 tablet by mouth nightly 3/15/22   Nya Merino MD   pantoprazole (PROTONIX) 20 MG tablet Take 1 tablet by mouth 2 times daily (before meals) 3/15/22   Nya Merino MD   traZODone (DESYREL) 150 MG tablet Take 1 tablet by mouth nightly 3/15/22   Nya Merino MD   venlafaxine (EFFEXOR XR) 150 MG extended release capsule Take 1 capsule by mouth daily (with breakfast) 3/15/22   Nya Merino MD   Alcohol Swabs 70 % PADS Use 1 swab 3 times daily as needed 3/15/22   Nya Merino MD   blood glucose monitor strips Test 3 times a day & as needed for symptoms of irregular blood glucose. Dispense sufficient amount for indicated testing frequency plus additional to accommodate PRN testing needs.  3/15/22   Nya Merino MD   Lancets MISC 1 each by Does not apply route 3 times daily 3/15/22   Nya Merino MD   Insulin Pen Needle ([employer] PEN NEEDLES 31G) 31G X 8 MM MISC 1 each by Does not apply route daily 3/15/22   Nya Merino MD   FREESTYLE LITE strip 1 each by Does not apply route 3 times daily 3/15/22   Nya Merino MD   budesonide-formoterol (SYMBICORT) 80-4.5 MCG/ACT AERO Inhale 2 puffs into the lungs 2 times daily 3/15/22   Nya Merino MD   albuterol sulfate  (90 Base) MCG/ACT inhaler Inhale 2 puffs into the lungs every 4 hours as needed for Wheezing 3/15/22 4/15/22  Narinder Youssef MD   mupirocin (BACTROBAN) 2 % ointment Apply topically 3 times daily Apply topically to right foot wound two times a day for wound care. Apply to right foot wound, cover with Kerlix and ace wrap. Historical Provider, MD   Misc.  Devices MISC 1 PAIR OF DIABETIC SHOES (1 LEFT/ 1 RIGHT)  1-3 PAIRS OF INSERTS (LEFT/ RIGHT) 2/15/22   Topher Hernandez DPM   dicyclomine (BENTYL) 10 MG capsule Take 1 capsule by mouth 4 times daily 12/17/21   Narinder Youssef MD   ibuprofen (ADVIL;MOTRIN) 800 MG tablet Take 1 tablet by mouth every 8 hours as needed for Pain 11/15/21 11/29/21  Abhay Caro MD   FLOVENT  MCG/ACT inhaler Inhale 2 puffs into the lungs 2 times daily 10/20/20   Narinder Youssef MD       Future Appointments   Date Time Provider Jj Hernandez   5/10/2022  9:30 AM Mckenna Guevara, PhD [facility] Hardin   5/23/2022  3:30 PM Meli Yao MD INFT DISEASE [facility]

## 2022-05-04 ENCOUNTER — CARE COORDINATION (OUTPATIENT)
Dept: CARE COORDINATION | Age: 57
End: 2022-05-04

## 2022-05-04 NOTE — CARE COORDINATION
Writer called Patient to do a follow up Social service call with her and to see how Patient was doing. Patient reported that she wanted to close her Bank accounts and re-open New ones as her Daughter had taken everything from her as she had access to her accounts; Writer inquired if she would like to report this and Patient declined. Writer told Patient that the One account in [place] she can have it transferred into her account here and close that account killing 2 Birds with 1 Stone; Then open a New account; Patient was in agreement.     Writer gave her the Number to [facility]  for [address] is setting up her Transportation to her appointment on 05/10/2022 @930am Trip Conformation Number is [phone]

## 2022-05-05 ENCOUNTER — CARE COORDINATION (OUTPATIENT)
Dept: CARE COORDINATION | Age: 57
End: 2022-05-05

## 2022-05-06 ENCOUNTER — CARE COORDINATION (OUTPATIENT)
Dept: CARE COORDINATION | Age: 57
End: 2022-05-06

## 2022-05-06 NOTE — CARE COORDINATION
Ambulatory Care Coordination Note  5/6/2022  CM Risk Score: 2  Charlson 10 Year Mortality Risk Score: 79%     ACC: Mike Michele    Summary Note: Marilu Hernandez returns CM's VM from yesterday. She reports she did not answer as she was \"in pain\". She states her feet were hurting her. She goes on to report [address] [employer] came with groceries yesterday but her feet were hurting to much to go down and pick them up. She reports the  would not allow them to deliver to her door. CM asked if she had been checking her blood sugars. Marilu Hernandez reported she has not been checking her blood sugars or taking her insulin. She checked her blood sugar while on the phone with CM. She reported the fasting result as 498. She states she has not been taking the insulin she has been prescribed. CM discuss neuropathy again with Marilu Hernandez. CM shared her neuropathy will not go away but if she gets her blood sugars controlled this pain will subside some. Marilu Hernandez states an understanding and reports she will take her AM insulin. CM encouraged Marilu Hernandez to call [address] [place], [place] [address] [place] and apologize for not making herself available to receive the groceries yesterday. CM instructed Marilu Hernandez to ask if they will consider delivering her some groceries stating she will make herself available to receive the delivery. Marilu Hernandez was agreeable to the plan. CM encouraged Marilu Hernandez to go to the [employer] on the first of next month to close out her current checking account and open a new one to secure that her daughter will not be able to pull out her money. Marilu Hernandez may need transportation and assistance to accomplish this. She will need to provide a new account and routing number to . CM encouraged Marilu Hernandez to keep her appointment with Dr. Sidney Ryan on 5.10.22. Marilu Hernandez reports she will. Plan:  Call Marilu Hernandez Monday, 5.9.22 to again remind her of her appointment and transportation time on 5.10.22.   Diabetes Assessment    Medic Alert ID: No  Meal Planning: Avoidance of concentrated sweets   How often do you test your blood sugar?: Daily, Bedtime (Comment: fasting, PP)   Do you have barriers with adherence to non-pharmacologic self-management interventions?  (Nutrition/Exercise/Self-Monitoring): Yes   Have you ever had to go to the ED for symptoms of low blood sugar?: No       Do you have hyperglycemia symptoms?: No    Per: Day   Do you have hypoglycemia symptoms?: No   Last Blood Sugar Value: 498   Blood Sugar Monitoring Regimen: Not Testing   Blood Sugar Trends: Steady Increase

## 2022-05-09 ENCOUNTER — CARE COORDINATION (OUTPATIENT)
Dept: CARE COORDINATION | Age: 57
End: 2022-05-09

## 2022-05-09 NOTE — CARE COORDINATION
called Patient to do a follow up Social service call with her, We discussed her appointment for tomorrow and Patient asked if she could get a ride to the [employer]; Writer told her they require 48 Hours for Transportation so they would only be able to take her to her [address] appointment. Patient did report that she was able to take advantage of the Number to [facility], she also reported that she was not able to transfer her account at this point as she needed money to do so.  will continue to try and assist Patient with her Social service needs as Needed.

## 2022-05-10 ENCOUNTER — CARE COORDINATION (OUTPATIENT)
Dept: CARE COORDINATION | Age: 57
End: 2022-05-10

## 2022-05-10 ENCOUNTER — OFFICE VISIT (OUTPATIENT)
Dept: FAMILY MEDICINE CLINIC | Age: 57
End: 2022-05-10
Payer: COMMERCIAL

## 2022-05-10 DIAGNOSIS — E66.9 DIABETES MELLITUS TYPE 2 IN OBESE (HCC): Primary | ICD-10-CM

## 2022-05-10 DIAGNOSIS — E11.69 DIABETES MELLITUS TYPE 2 IN OBESE (HCC): Primary | ICD-10-CM

## 2022-05-10 PROCEDURE — 96165 HLTH BHV IVNTJ GRP EA ADDL: CPT | Performed by: PSYCHOLOGIST

## 2022-05-10 PROCEDURE — 96164 HLTH BHV IVNTJ GRP 1ST 30: CPT | Performed by: PSYCHOLOGIST

## 2022-05-10 NOTE — CARE COORDINATION
Attempted to reach patient for nutrition follow up. Mail box is full unable to leave a voice message. Will attempt to reach again in 1-2 weeks.   FRANCESCO Mariee

## 2022-05-10 NOTE — PROGRESS NOTES
5/10/2022  Time: 9:36-10:20am    Laly Koch  was referred to Rene Nascimento, PhD by [facility] physicians. Clinician met with patient for educational group before being scheduled with clinician on individual schedule. Provided psycho-education about depression, anxiety, the stress response, sleep, exercise, and self-care. Engaged in relaxation exercise. Pt. did not sign consent form after being provided with information about brief approach to therapy and confidentiality due to time constrainst.  Her ride was waiting for her. Patient wants to be scheduled for an individual appointment with provider following group. I told her staff would call her to schedule.

## 2022-05-12 ENCOUNTER — CARE COORDINATION (OUTPATIENT)
Dept: CARE COORDINATION | Age: 57
End: 2022-05-12

## 2022-05-13 ENCOUNTER — TELEPHONE (OUTPATIENT)
Dept: FAMILY MEDICINE CLINIC | Age: 57
End: 2022-05-13

## 2022-05-13 NOTE — TELEPHONE ENCOUNTER
Attempted twice to reach pt to set up a face to face appt with Dr. Michelle Larose, no ans, and vm is not set up.

## 2022-05-19 ENCOUNTER — CARE COORDINATION (OUTPATIENT)
Dept: CARE COORDINATION | Age: 57
End: 2022-05-19

## 2022-05-19 NOTE — CARE COORDINATION
Spoke with patient who relays she is not feeling well  Thinks she has COVID-19. Patient states stomach ache   Main symptom. Encouraged her to make sure she isn't  Going to long between meals and staying well hydrated-  Drinking fluids often. Patient requested I call back when  She is feeling better in a week or so.    [place],FRANCESCO

## 2022-05-23 ENCOUNTER — CARE COORDINATION (OUTPATIENT)
Dept: CARE COORDINATION | Age: 57
End: 2022-05-23

## 2022-05-24 ENCOUNTER — CARE COORDINATION (OUTPATIENT)
Dept: CARE COORDINATION | Age: 57
End: 2022-05-24

## 2022-05-24 NOTE — CARE COORDINATION
GORDON not able to connect with [facility]. Her VM ganesh is full. Letter pended in her chart for mailing to her home. Plan:  F/U with [facility] on Friday.

## 2022-05-26 ENCOUNTER — CARE COORDINATION (OUTPATIENT)
Dept: CARE COORDINATION | Age: 57
End: 2022-05-26

## 2022-06-03 ENCOUNTER — CARE COORDINATION (OUTPATIENT)
Dept: CARE COORDINATION | Age: 57
End: 2022-06-03

## 2022-06-03 NOTE — CARE COORDINATION
Writer called Patient to do a follow up Social service call with her.     Patient did not answer the phone and her voicemail box is full and can not receive any messages.     Writer will make effort to reach Patient at a  Later date and time.

## 2022-06-06 ENCOUNTER — CARE COORDINATION (OUTPATIENT)
Dept: CARE COORDINATION | Age: 57
End: 2022-06-06

## 2022-06-06 NOTE — CARE COORDINATION
GORDON has been unable to connect with [facility] by phone. A letter was mailed to her home requesting a return call on 5.26.22. [facility] has not reached out to GORDON. Plan:  Remove from panel, patient is not engaged.

## 2022-06-08 ENCOUNTER — CARE COORDINATION (OUTPATIENT)
Dept: CARE COORDINATION | Age: 57
End: 2022-06-08

## 2022-06-08 NOTE — CARE COORDINATION
Writer called Patient to do a follow up Social service call with her. Patient's phone picked up on the first ring and her mailbox is full and can not receive messages.

## 2022-06-10 ENCOUNTER — CARE COORDINATION (OUTPATIENT)
Dept: CARE COORDINATION | Age: 57
End: 2022-06-10

## 2022-06-10 NOTE — CARE COORDINATION
6/10/22-unable to reach patient despite multiple attempts. Will remove from panel.   FRANCESCO Mariee

## 2022-06-21 ENCOUNTER — CARE COORDINATION (OUTPATIENT)
Dept: CARE COORDINATION | Age: 57
End: 2022-06-21

## 2022-06-23 NOTE — CARE COORDINATION
Writer called Patient to do a follow up Social service call with her.     Patient's phone picked up on the first ring and her mailbox is full and can not receive messages. Writer last spoke to Patient on 05/09/2022 and have reached out to her on multiple occassions: Patient has not called writer back or answered the phone on any of the occassions when I have called.  is signing off on the case at this time.

## 2022-08-03 ENCOUNTER — APPOINTMENT (OUTPATIENT)
Dept: GENERAL RADIOLOGY | Age: 57
DRG: 420 | End: 2022-08-03
Payer: COMMERCIAL

## 2022-08-03 ENCOUNTER — APPOINTMENT (OUTPATIENT)
Dept: MRI IMAGING | Age: 57
DRG: 420 | End: 2022-08-03
Payer: COMMERCIAL

## 2022-08-03 ENCOUNTER — APPOINTMENT (OUTPATIENT)
Dept: CT IMAGING | Age: 57
DRG: 420 | End: 2022-08-03
Payer: COMMERCIAL

## 2022-08-03 ENCOUNTER — HOSPITAL ENCOUNTER (INPATIENT)
Age: 57
LOS: 2 days | Discharge: LEFT BEFORE DISCHARGE | DRG: 420 | End: 2022-08-05
Attending: EMERGENCY MEDICINE | Admitting: FAMILY MEDICINE
Payer: COMMERCIAL

## 2022-08-03 DIAGNOSIS — R40.0 SOMNOLENCE: Primary | ICD-10-CM

## 2022-08-03 DIAGNOSIS — I63.512 ACUTE ISCHEMIC LEFT MCA STROKE (HCC): ICD-10-CM

## 2022-08-03 PROBLEM — E11.10 DKA, TYPE 2, NOT AT GOAL (HCC): Status: ACTIVE | Noted: 2022-08-03

## 2022-08-03 PROBLEM — G93.40 ACUTE ENCEPHALOPATHY: Status: ACTIVE | Noted: 2017-06-13

## 2022-08-03 LAB
ABSOLUTE EOS #: 0.16 K/UL (ref 0–0.44)
ABSOLUTE IMMATURE GRANULOCYTE: <0.03 K/UL (ref 0–0.3)
ABSOLUTE LYMPH #: 2.13 K/UL (ref 1.1–3.7)
ABSOLUTE MONO #: 0.72 K/UL (ref 0.1–1.2)
ACETAMINOPHEN LEVEL: <5 UG/ML (ref 10–30)
ALBUMIN SERPL-MCNC: 3.8 G/DL (ref 3.5–5.2)
ALBUMIN/GLOBULIN RATIO: 1.2 (ref 1–2.5)
ALP BLD-CCNC: 103 U/L (ref 35–104)
ALT SERPL-CCNC: 10 U/L (ref 5–33)
AMMONIA: 28 UMOL/L (ref 11–51)
ANION GAP SERPL CALCULATED.3IONS-SCNC: 13 MMOL/L (ref 9–17)
ANION GAP: 12 MMOL/L (ref 7–16)
AST SERPL-CCNC: 15 U/L
BASOPHILS # BLD: 1 % (ref 0–2)
BASOPHILS ABSOLUTE: 0.06 K/UL (ref 0–0.2)
BETA-HYDROXYBUTYRATE: 1.48 MMOL/L (ref 0.02–0.27)
BILIRUB SERPL-MCNC: 0.33 MG/DL (ref 0.3–1.2)
BUN BLDV-MCNC: 8 MG/DL (ref 6–20)
CALCIUM SERPL-MCNC: 9.2 MG/DL (ref 8.6–10.4)
CARBOXYHEMOGLOBIN: 2.2 % (ref 0–5)
CARBOXYHEMOGLOBIN: 2.4 % (ref 0–5)
CHLORIDE BLD-SCNC: 103 MMOL/L (ref 98–107)
CO2: 22 MMOL/L (ref 20–31)
CREAT SERPL-MCNC: 0.76 MG/DL (ref 0.5–0.9)
EOSINOPHILS RELATIVE PERCENT: 2 % (ref 1–4)
ETHANOL PERCENT: <0.01 %
ETHANOL: <10 MG/DL
FIO2: ABNORMAL
FIO2: ABNORMAL
GFR AFRICAN AMERICAN: >60 ML/MIN
GFR NON-AFRICAN AMERICAN: >60 ML/MIN
GFR NON-AFRICAN AMERICAN: >60 ML/MIN
GFR SERPL CREATININE-BSD FRML MDRD: >60 ML/MIN
GFR SERPL CREATININE-BSD FRML MDRD: ABNORMAL ML/MIN/{1.73_M2}
GFR SERPL CREATININE-BSD FRML MDRD: NORMAL ML/MIN/{1.73_M2}
GLUCOSE BLD-MCNC: 169 MG/DL (ref 65–105)
GLUCOSE BLD-MCNC: 172 MG/DL (ref 65–105)
GLUCOSE BLD-MCNC: 406 MG/DL (ref 74–100)
GLUCOSE BLD-MCNC: 421 MG/DL (ref 70–99)
GLUCOSE BLD-MCNC: 515 MG/DL (ref 65–105)
HCO3 VENOUS: 23.2 MMOL/L (ref 24–30)
HCO3 VENOUS: 24 MMOL/L (ref 24–30)
HCO3, MIXED: 23.5 MMOL/L (ref 23–29)
HCT VFR BLD CALC: 32.6 % (ref 36.3–47.1)
HEMOGLOBIN: 10.7 G/DL (ref 11.9–15.1)
IMMATURE GRANULOCYTES: 0 %
LACTIC ACID, WHOLE BLOOD: 1.2 MMOL/L (ref 0.7–2.1)
LYMPHOCYTES # BLD: 28 % (ref 24–43)
MCH RBC QN AUTO: 30.8 PG (ref 25.2–33.5)
MCHC RBC AUTO-ENTMCNC: 32.8 G/DL (ref 28.4–34.8)
MCV RBC AUTO: 93.9 FL (ref 82.6–102.9)
MONOCYTES # BLD: 10 % (ref 3–12)
NEGATIVE BASE EXCESS, MIXED: 2 (ref 0–2)
NEGATIVE BASE EXCESS, VEN: 2.1 MMOL/L (ref 0–2)
NEGATIVE BASE EXCESS, VEN: 3.3 MMOL/L (ref 0–2)
NRBC AUTOMATED: 0 PER 100 WBC
O2 SAT, MIXED: 87 % (ref 60–80)
O2 SAT, VEN: 65.6 % (ref 60–85)
O2 SAT, VEN: 77.7 % (ref 60–85)
PATIENT TEMP: 37
PATIENT TEMP: 37
PCO2 MIXED: 39.9 MM HG (ref 42–52)
PCO2, VEN: 50.3 (ref 39–55)
PCO2, VEN: 50.9 (ref 39–55)
PDW BLD-RTO: 12 % (ref 11.8–14.4)
PH VENOUS: 7.28 (ref 7.32–7.42)
PH VENOUS: 7.3 (ref 7.32–7.42)
PH, MIXED: 7.38 (ref 7.31–7.41)
PLATELET # BLD: 329 K/UL (ref 138–453)
PMV BLD AUTO: 11.2 FL (ref 8.1–13.5)
PO2 MIXED: 54.1 MM HG (ref 35–45)
PO2, VEN: 40.4 (ref 30–50)
PO2, VEN: 47.9 (ref 30–50)
POC BUN: 9 MG/DL (ref 8–26)
POC CHLORIDE: 108 MMOL/L (ref 98–107)
POC CREATININE: 0.63 MG/DL (ref 0.51–1.19)
POC HEMATOCRIT: 34 % (ref 36–46)
POC HEMOGLOBIN: 11.6 G/DL (ref 12–16)
POC IONIZED CALCIUM: 1.19 MMOL/L (ref 1.15–1.33)
POC LACTIC ACID: 0.98 MMOL/L (ref 0.56–1.39)
POC POTASSIUM: 3.6 MMOL/L (ref 3.5–4.5)
POC SODIUM: 143 MMOL/L (ref 138–146)
POC TCO2: 24 MMOL/L (ref 22–30)
POTASSIUM SERPL-SCNC: 3.5 MMOL/L (ref 3.7–5.3)
RBC # BLD: 3.47 M/UL (ref 3.95–5.11)
SALICYLATE LEVEL: 1 MG/DL (ref 3–10)
SEG NEUTROPHILS: 59 % (ref 36–65)
SEGMENTED NEUTROPHILS ABSOLUTE COUNT: 4.47 K/UL (ref 1.5–8.1)
SODIUM BLD-SCNC: 138 MMOL/L (ref 135–144)
TOTAL PROTEIN: 6.9 G/DL (ref 6.4–8.3)
TOXIC TRICYCLIC SC,BLOOD: NEGATIVE
TROPONIN, HIGH SENSITIVITY: 28 NG/L (ref 0–14)
WBC # BLD: 7.6 K/UL (ref 3.5–11.3)

## 2022-08-03 PROCEDURE — 6360000004 HC RX CONTRAST MEDICATION: Performed by: STUDENT IN AN ORGANIZED HEALTH CARE EDUCATION/TRAINING PROGRAM

## 2022-08-03 PROCEDURE — 96372 THER/PROPH/DIAG INJ SC/IM: CPT

## 2022-08-03 PROCEDURE — 82565 ASSAY OF CREATININE: CPT

## 2022-08-03 PROCEDURE — 83605 ASSAY OF LACTIC ACID: CPT

## 2022-08-03 PROCEDURE — 80307 DRUG TEST PRSMV CHEM ANLYZR: CPT

## 2022-08-03 PROCEDURE — 87040 BLOOD CULTURE FOR BACTERIA: CPT

## 2022-08-03 PROCEDURE — 2060000000 HC ICU INTERMEDIATE R&B

## 2022-08-03 PROCEDURE — 85014 HEMATOCRIT: CPT

## 2022-08-03 PROCEDURE — 0042T CT BRAIN PERFUSION: CPT

## 2022-08-03 PROCEDURE — 80053 COMPREHEN METABOLIC PANEL: CPT

## 2022-08-03 PROCEDURE — 96374 THER/PROPH/DIAG INJ IV PUSH: CPT

## 2022-08-03 PROCEDURE — 99285 EMERGENCY DEPT VISIT HI MDM: CPT

## 2022-08-03 PROCEDURE — 80051 ELECTROLYTE PANEL: CPT

## 2022-08-03 PROCEDURE — 93005 ELECTROCARDIOGRAM TRACING: CPT | Performed by: EMERGENCY MEDICINE

## 2022-08-03 PROCEDURE — 85025 COMPLETE CBC W/AUTO DIFF WBC: CPT

## 2022-08-03 PROCEDURE — 6360000002 HC RX W HCPCS: Performed by: STUDENT IN AN ORGANIZED HEALTH CARE EDUCATION/TRAINING PROGRAM

## 2022-08-03 PROCEDURE — 84520 ASSAY OF UREA NITROGEN: CPT

## 2022-08-03 PROCEDURE — 99255 IP/OBS CONSLTJ NEW/EST HI 80: CPT | Performed by: PSYCHIATRY & NEUROLOGY

## 2022-08-03 PROCEDURE — 70450 CT HEAD/BRAIN W/O DYE: CPT

## 2022-08-03 PROCEDURE — 70496 CT ANGIOGRAPHY HEAD: CPT

## 2022-08-03 PROCEDURE — 71045 X-RAY EXAM CHEST 1 VIEW: CPT

## 2022-08-03 PROCEDURE — 80179 DRUG ASSAY SALICYLATE: CPT

## 2022-08-03 PROCEDURE — 76937 US GUIDE VASCULAR ACCESS: CPT

## 2022-08-03 PROCEDURE — 82805 BLOOD GASES W/O2 SATURATION: CPT

## 2022-08-03 PROCEDURE — 70551 MRI BRAIN STEM W/O DYE: CPT

## 2022-08-03 PROCEDURE — 6370000000 HC RX 637 (ALT 250 FOR IP): Performed by: EMERGENCY MEDICINE

## 2022-08-03 PROCEDURE — 82140 ASSAY OF AMMONIA: CPT

## 2022-08-03 PROCEDURE — 80143 DRUG ASSAY ACETAMINOPHEN: CPT

## 2022-08-03 PROCEDURE — 82010 KETONE BODYS QUAN: CPT

## 2022-08-03 PROCEDURE — G0480 DRUG TEST DEF 1-7 CLASSES: HCPCS

## 2022-08-03 PROCEDURE — 82803 BLOOD GASES ANY COMBINATION: CPT

## 2022-08-03 PROCEDURE — 99221 1ST HOSP IP/OBS SF/LOW 40: CPT | Performed by: NURSE PRACTITIONER

## 2022-08-03 PROCEDURE — 82947 ASSAY GLUCOSE BLOOD QUANT: CPT

## 2022-08-03 PROCEDURE — 2580000003 HC RX 258: Performed by: EMERGENCY MEDICINE

## 2022-08-03 PROCEDURE — 82330 ASSAY OF CALCIUM: CPT

## 2022-08-03 PROCEDURE — 84484 ASSAY OF TROPONIN QUANT: CPT

## 2022-08-03 PROCEDURE — 36415 COLL VENOUS BLD VENIPUNCTURE: CPT

## 2022-08-03 PROCEDURE — 74018 RADEX ABDOMEN 1 VIEW: CPT

## 2022-08-03 RX ORDER — HEPARIN SODIUM 5000 [USP'U]/ML
5000 INJECTION, SOLUTION INTRAVENOUS; SUBCUTANEOUS EVERY 8 HOURS SCHEDULED
Status: DISCONTINUED | OUTPATIENT
Start: 2022-08-04 | End: 2022-08-05 | Stop reason: HOSPADM

## 2022-08-03 RX ORDER — DICYCLOMINE HYDROCHLORIDE 10 MG/1
10 CAPSULE ORAL 4 TIMES DAILY
Status: DISCONTINUED | OUTPATIENT
Start: 2022-08-04 | End: 2022-08-05 | Stop reason: HOSPADM

## 2022-08-03 RX ORDER — POLYETHYLENE GLYCOL 3350 17 G/17G
17 POWDER, FOR SOLUTION ORAL DAILY PRN
Status: DISCONTINUED | OUTPATIENT
Start: 2022-08-03 | End: 2022-08-05 | Stop reason: HOSPADM

## 2022-08-03 RX ORDER — ALBUTEROL SULFATE 90 UG/1
2 AEROSOL, METERED RESPIRATORY (INHALATION) EVERY 4 HOURS PRN
Status: DISCONTINUED | OUTPATIENT
Start: 2022-08-03 | End: 2022-08-05 | Stop reason: HOSPADM

## 2022-08-03 RX ORDER — MIRTAZAPINE 15 MG/1
7.5 TABLET, FILM COATED ORAL NIGHTLY
Status: DISCONTINUED | OUTPATIENT
Start: 2022-08-04 | End: 2022-08-05 | Stop reason: HOSPADM

## 2022-08-03 RX ORDER — ACETAMINOPHEN 650 MG/1
650 SUPPOSITORY RECTAL EVERY 6 HOURS PRN
Status: DISCONTINUED | OUTPATIENT
Start: 2022-08-03 | End: 2022-08-05 | Stop reason: HOSPADM

## 2022-08-03 RX ORDER — MAGNESIUM SULFATE IN WATER 40 MG/ML
2000 INJECTION, SOLUTION INTRAVENOUS PRN
Status: DISCONTINUED | OUTPATIENT
Start: 2022-08-03 | End: 2022-08-05 | Stop reason: HOSPADM

## 2022-08-03 RX ORDER — SODIUM CHLORIDE, SODIUM LACTATE, POTASSIUM CHLORIDE, CALCIUM CHLORIDE 600; 310; 30; 20 MG/100ML; MG/100ML; MG/100ML; MG/100ML
INJECTION, SOLUTION INTRAVENOUS CONTINUOUS
Status: DISCONTINUED | OUTPATIENT
Start: 2022-08-03 | End: 2022-08-04

## 2022-08-03 RX ORDER — ONDANSETRON 4 MG/1
4 TABLET, ORALLY DISINTEGRATING ORAL EVERY 8 HOURS PRN
Status: DISCONTINUED | OUTPATIENT
Start: 2022-08-03 | End: 2022-08-05 | Stop reason: HOSPADM

## 2022-08-03 RX ORDER — POTASSIUM CHLORIDE 7.45 MG/ML
10 INJECTION INTRAVENOUS PRN
Status: DISCONTINUED | OUTPATIENT
Start: 2022-08-03 | End: 2022-08-05 | Stop reason: HOSPADM

## 2022-08-03 RX ORDER — DEXTROSE MONOHYDRATE 50 MG/ML
100 INJECTION, SOLUTION INTRAVENOUS PRN
Status: DISCONTINUED | OUTPATIENT
Start: 2022-08-03 | End: 2022-08-05 | Stop reason: HOSPADM

## 2022-08-03 RX ORDER — ATORVASTATIN CALCIUM 80 MG/1
40 TABLET, FILM COATED ORAL NIGHTLY
Status: DISCONTINUED | OUTPATIENT
Start: 2022-08-03 | End: 2022-08-03 | Stop reason: SDUPTHER

## 2022-08-03 RX ORDER — INSULIN LISPRO 100 [IU]/ML
10 INJECTION, SOLUTION INTRAVENOUS; SUBCUTANEOUS ONCE
Status: COMPLETED | OUTPATIENT
Start: 2022-08-03 | End: 2022-08-03

## 2022-08-03 RX ORDER — BUDESONIDE AND FORMOTEROL FUMARATE DIHYDRATE 80; 4.5 UG/1; UG/1
2 AEROSOL RESPIRATORY (INHALATION) 2 TIMES DAILY
Status: DISCONTINUED | OUTPATIENT
Start: 2022-08-04 | End: 2022-08-05 | Stop reason: HOSPADM

## 2022-08-03 RX ORDER — SODIUM CHLORIDE 0.9 % (FLUSH) 0.9 %
5-40 SYRINGE (ML) INJECTION EVERY 12 HOURS SCHEDULED
Status: DISCONTINUED | OUTPATIENT
Start: 2022-08-04 | End: 2022-08-05 | Stop reason: HOSPADM

## 2022-08-03 RX ORDER — PANTOPRAZOLE SODIUM 20 MG/1
20 TABLET, DELAYED RELEASE ORAL
Status: DISCONTINUED | OUTPATIENT
Start: 2022-08-04 | End: 2022-08-05 | Stop reason: HOSPADM

## 2022-08-03 RX ORDER — ASPIRIN 81 MG/1
81 TABLET, CHEWABLE ORAL DAILY
Status: DISCONTINUED | OUTPATIENT
Start: 2022-08-03 | End: 2022-08-05 | Stop reason: HOSPADM

## 2022-08-03 RX ORDER — ACETAMINOPHEN 500 MG
1000 TABLET ORAL 3 TIMES DAILY PRN
Status: DISCONTINUED | OUTPATIENT
Start: 2022-08-03 | End: 2022-08-03

## 2022-08-03 RX ORDER — INSULIN GLARGINE 100 [IU]/ML
30 INJECTION, SOLUTION SUBCUTANEOUS 2 TIMES DAILY
Status: DISCONTINUED | OUTPATIENT
Start: 2022-08-04 | End: 2022-08-05 | Stop reason: HOSPADM

## 2022-08-03 RX ORDER — ONDANSETRON 2 MG/ML
4 INJECTION INTRAMUSCULAR; INTRAVENOUS EVERY 6 HOURS PRN
Status: DISCONTINUED | OUTPATIENT
Start: 2022-08-03 | End: 2022-08-05 | Stop reason: HOSPADM

## 2022-08-03 RX ORDER — ATORVASTATIN CALCIUM 40 MG/1
40 TABLET, FILM COATED ORAL NIGHTLY
Status: DISCONTINUED | OUTPATIENT
Start: 2022-08-04 | End: 2022-08-05 | Stop reason: HOSPADM

## 2022-08-03 RX ORDER — SODIUM CHLORIDE 9 MG/ML
INJECTION, SOLUTION INTRAVENOUS PRN
Status: DISCONTINUED | OUTPATIENT
Start: 2022-08-03 | End: 2022-08-05 | Stop reason: HOSPADM

## 2022-08-03 RX ORDER — MIDAZOLAM HYDROCHLORIDE 2 MG/2ML
2 INJECTION, SOLUTION INTRAMUSCULAR; INTRAVENOUS ONCE
Status: COMPLETED | OUTPATIENT
Start: 2022-08-03 | End: 2022-08-03

## 2022-08-03 RX ORDER — HALOPERIDOL 5 MG/ML
5 INJECTION INTRAMUSCULAR ONCE
Status: COMPLETED | OUTPATIENT
Start: 2022-08-03 | End: 2022-08-03

## 2022-08-03 RX ORDER — POTASSIUM CHLORIDE 20 MEQ/1
40 TABLET, EXTENDED RELEASE ORAL PRN
Status: DISCONTINUED | OUTPATIENT
Start: 2022-08-03 | End: 2022-08-05 | Stop reason: HOSPADM

## 2022-08-03 RX ORDER — SODIUM CHLORIDE 0.9 % (FLUSH) 0.9 %
5-40 SYRINGE (ML) INJECTION PRN
Status: DISCONTINUED | OUTPATIENT
Start: 2022-08-03 | End: 2022-08-05 | Stop reason: HOSPADM

## 2022-08-03 RX ORDER — SODIUM CHLORIDE 9 MG/ML
1000 INJECTION, SOLUTION INTRAVENOUS CONTINUOUS
Status: ACTIVE | OUTPATIENT
Start: 2022-08-03 | End: 2022-08-03

## 2022-08-03 RX ORDER — VENLAFAXINE HYDROCHLORIDE 150 MG/1
150 CAPSULE, EXTENDED RELEASE ORAL
Status: DISCONTINUED | OUTPATIENT
Start: 2022-08-04 | End: 2022-08-05 | Stop reason: HOSPADM

## 2022-08-03 RX ORDER — ACETAMINOPHEN 325 MG/1
650 TABLET ORAL EVERY 6 HOURS PRN
Status: DISCONTINUED | OUTPATIENT
Start: 2022-08-03 | End: 2022-08-05 | Stop reason: HOSPADM

## 2022-08-03 RX ADMIN — MIDAZOLAM HYDROCHLORIDE 2 MG: 2 INJECTION, SOLUTION INTRAMUSCULAR; INTRAVENOUS at 17:30

## 2022-08-03 RX ADMIN — SODIUM CHLORIDE 1000 ML: 9 INJECTION, SOLUTION INTRAVENOUS at 17:00

## 2022-08-03 RX ADMIN — INSULIN LISPRO 10 UNITS: 100 INJECTION, SOLUTION INTRAVENOUS; SUBCUTANEOUS at 17:10

## 2022-08-03 RX ADMIN — IOPAMIDOL 125 ML: 755 INJECTION, SOLUTION INTRAVENOUS at 17:20

## 2022-08-03 RX ADMIN — HALOPERIDOL LACTATE 5 MG: 5 INJECTION, SOLUTION INTRAMUSCULAR at 17:11

## 2022-08-03 NOTE — CONSULTS
Endovascular Neurosurgery Consult      Reason for evaluation: abnormal CTP      SUBJECTIVE:       History of Chief Complaint:    56year old woman with bipolar disorder, schizophrenia, uncontrolled DM, asthma, patient is known to the ED security as she frequently hang around the hospital.    Patient was brought in today because of altered mentation, CT head in the ED showed a left frontal infarct and right parietal encephalomalacia. Neurology was first consulted and endovascular was then consulted for the abnormal CTP. Per the staff who knows her, she is at her baseline. In the ED, she appears confused, tangential and sometime not cooperative, no weakness was witnessed. Pt at baseline appeared to be \"off\" to people who knows her. It is unclear if patient took her meds. Allergies  is allergic to bactrim [sulfamethoxazole-trimethoprim] and adhesive tape. Medications  Prior to Admission medications    Medication Sig Start Date End Date Taking? Authorizing Provider   pregabalin (LYRICA) 200 MG capsule Take 1 capsule by mouth daily for 30 days.  4/22/22 5/22/22  Marilin Case MD   insulin glargine (LANTUS SOLOSTAR) 100 UNIT/ML injection pen Inject 30 Units into the skin 2 times daily 4/22/22   Marilin Case MD   atorvastatin (LIPITOR) 40 MG tablet Take 1 tablet by mouth nightly 4/22/22 7/21/22  Marilin Case MD   acetaminophen (TYLENOL) 500 MG tablet Take 2 tablets by mouth 3 times daily as needed for Pain 3/15/22   Marilin Case MD   fluticasone (FLONASE) 50 MCG/ACT nasal spray 1 spray by Each Nostril route daily 3/15/22   Marilin Case MD   metFORMIN (GLUCOPHAGE) 1000 MG tablet Take 1 tablet by mouth 2 times daily (with meals) 3/15/22   Marilin Case MD   mirtazapine (REMERON) 7.5 MG tablet Take 1 tablet by mouth nightly 3/15/22   Marilin Case MD   pantoprazole (PROTONIX) 20 MG tablet Take 1 tablet by mouth 2 times daily (before meals) 3/15/22   Marilin Case MD 40 mg Oral Nightly     Continuous Infusions:   dextrose      insulin      lactated ringers       PRN Meds:.glucose, dextrose bolus **OR** dextrose bolus, glucagon (rDNA), dextrose  Past Medical History   has a past medical history of Acid reflux, Acute cystitis with hematuria, Acute non-recurrent maxillary sinusitis, Asthma, Bipolar 1 disorder ([address] Bipolar disorder, mixed ([address] BMI 34.0-34.9,adult, Cannabis use disorder, severe, dependence ([address] Cerebrovascular accident (CVA) ([address] Chest pain, Chronic renal insufficiency, Cocaine abuse ([address] COVID-19 virus RNA test result unknown, DDD (degenerative disc disease), cervical, Diabetes mellitus ([address] Dizziness, Fibromyalgia, History of stroke, Homicidal ideation, Hyperglycemia, Hypertension, Hypotension, IDDM (insulin dependent diabetes mellitus), Lupus ([address] Migraine, Neuropathy, Neuropathy, Polysubstance abuse ([address] Post traumatic stress disorder (PTSD), Posttraumatic stress disorder, Recurrent depression ([address] Recurrent major depressive disorder, in partial remission ([address] Screening mammogram, encounter for, Severe recurrent major depression with psychotic features ([address] Severe recurrent major depression without psychotic features ([address] Stroke (cerebrum) ([address] Stroke ([address] Suicidal ideation, Suicidal intent, Vitamin D deficiency, and White matter changes. Past Surgical History   has a past surgical history that includes Abscess Drainage; chest tube insertion; Abdomen surgery; Cataract removal with implant (Bilateral); LASIK (Bilateral); Finger amputation (Left, 03/13/2021); Hand surgery (Right, 09/14/2021); Hand surgery (Right, 09/15/2021); Finger amputation (Right, 10/11/2021); Foot surgery (Right, 01/27/2022); Foot Debridement (Right, 1/27/2022); and Insert Midline Catheter (2/3/2022). Social History   reports that she has never smoked. She has never used smokeless tobacco.   reports that she does not currently use alcohol.    reports current drug use. Drugs: Marijuana (Weed) and Cocaine. Family History  family history includes Diabetes in her brother, father, mother, and sister; No Known Problems in her sister; Other in her son; Stroke in her mother. Review of Systems:  CONSTITUTIONAL:  negative for fevers, chills, fatigue and malaise    EYES:  negative for double vision, blurred vision and photophobia     HEENT:  negative for tinnitus, epistaxis and sore throat    RESPIRATORY:  negative for cough, shortness of breath, wheezing    CARDIOVASCULAR:  negative for chest pain, palpitations, syncope, edema    GASTROINTESTINAL:  negative for nausea, vomiting    GENITOURINARY:  negative for incontinence    MUSCULOSKELETAL:  negative for neck or back pain    NEUROLOGICAL:  Negative for weakness and tingling  negative for headaches and dizziness    PSYCHIATRIC:  negative for anxiety      Review of systems otherwise negative. OBJECTIVE:     Vitals:    08/03/22 1151   BP: 116/82   Pulse:    Resp:    Temp:    SpO2:         General:  Gen: normal habitus, NAD  HEENT: NCAT, mucosa moist  Cvs: RRR, S1 S2 normal  Resp: symmetric unlabored breathing  Abd: s/nd/nt  Ext: no edema  Skin: no lesions seen, warm and dry    Neuro:  Gen: awake and alert, oriented x3. Lang/speech: no aphasia or dysarthria. Patient language  Follows commands. But easily tangential, she can answer all question correctly when she wants to  CN: PERRL, EOMI, VFF, V1-3 intact, face symmetric, hearing intact, shoulder shrug symmetric, tongue midline  Motor: grossly 5/5 UE and LE b/l  Sense: LT intact in all 4 ext. Coord: FTN and HTS intact b/l  DTR: deferred  Gait: not tested    NIH Stroke Scale:   1a  Level of consciousness: 0 - alert; keenly responsive   1b. LOC questions:  0 - answers both questions correctly   1c. LOC commands: 0 - performs both tasks correctly   2. Best Gaze: 0 - normal   3. Visual: 0 - no visual loss   4. Facial Palsy: 0 - normal symmetric movement   5a.  Motor left arm: 0 - no drift, limb holds 90 (or 45) degrees for full 10 seconds   5b. Motor right arm: 0 - no drift, limb holds 90 (or 45) degrees for full 10 seconds   6a. Motor left le - no drift; leg holds 30 degree position for full 5 seconds   6b  Motor right le - no drift; leg holds 30 degree position for full 5 seconds   7. Limb Ataxia: 0 - absent   8. Sensory: 0 - normal; no sensory loss   9. Best Language:  0 - no aphasia, normal   10. Dysarthria: 0 - normal   11. Extinction and Inattention: 0 - no abnormality         Total:   0     MRS: 0      LABS:   Reviewed. Lab Results   Component Value Date    HGB 10.7 (L) 2022    WBC 7.6 2022     2022     2022    BUN 8 2022    CREATININE 0.76 2022    AST 15 2022    ALT 10 2022    MG 2.0 2021    APTT 22.4 2020    INR 1.0 2022      Lab Results   Component Value Date/Time    COVID19 Not Detected 2022 11:03 PM       RADIOLOGY:   Images were personally reviewed including:    CT head: left frontal hypodense, tumor vs acute stroke    CTA head neck: no LVO, moderate bilateral cavernous    CTP: odd looking penumbra in the left frontal/temporal lobe with abnormal MTT, no core infarct seen    MRI brain w/o contrast: questionable odd looking acute ischemic stroke vs tumor in the left frontal temporal with hemorrhagic transformation on SWI    ASSESSMENT:       56year old woman with uncontrolled DM, bipolar disorder, p/w acute encephalopathy in the ED and found to have DKA.  CT head showed an usual left frontal hypodense sign, and CTP shows an unusual prolonged MTT in the left frontal lobe with MRI brain confirmed this is either a stroke or possible tumor    PLAN:     - CTA head neck does not indicate any clot, no endovascular intervention    - recommend repeating MRI brain w contrast tomorrow to see if that is a tumor, please consult oncological neurosurgery Dr. Chayito Wagoner to have a second opinion     - ok to start aspirin 81mg daily   - lipitor 40mg daily for now, send lipid panel and a1c    - DKA management per medicine team      Case discussed with Dr. Tali Minor attending.       Oneyda Rocha MD PGY 7  Stroke, [facility]  [address] [place]  Electronically signed 8/3/2022 at 6:28 PM

## 2022-08-03 NOTE — ED NOTES
Pt keeps attempting to elope. Security has been at bedside several times. Pt refuses getting into bed and is in personal wheelchair.         Burak Roman, VINOD  08/03/22 6053

## 2022-08-03 NOTE — PROGRESS NOTES
Assessment: Patient was sitting up in her wheelchair when  visited. Family was not present at the time. However, patient confirmed she had family support. Patient was in good spirit and very receptive to spiritual care. When asked how she was feeling, patient responded; \"a bit okay. \" Patient was open to prayer. Intervention:  maintained listening presence, offered support and prayed with patient. Outcome: Patient expressed appreciation for the spiritual support she received. Plan: Follow up visits recommended for more prayers and support.

## 2022-08-03 NOTE — ED NOTES
The following labs labeled with pt sticker and tubed to lab:     [x] Blue     [x] Lavender   [] on ice  [] Green/yellow  [x] Green/black [] on ice  [x] Yellow  [x] Red  [] Pink      [] COVID-19 swab    [] Rapid  [] PCR  [] Flu swab  [] Peds Viral Panel     [] Urine Sample  [] Pelvic Cultures  [x] Blood Cultures            Delgado Leo RN  08/03/22 2801

## 2022-08-03 NOTE — ED NOTES
Pt attached to ECG and continuous pulse ox.    Pt on cart with respirations regular   Will continue to monitor       Aaliyah Haji RN  08/03/22 1468

## 2022-08-03 NOTE — CONSULTS
NEUROLOGY INPATIENT CONSULT NOTE          8/3/2022      Reason for consult: L MCA infarct  Requesting physician: Ty Piedra     Chief complaint: AMS      HPI: I had the pleasure of seeing your patient in neurology consultation. As you would recall Jayda Askew is a  56 y.o. female with H/O uncontrolled DM, polyneuropathy, cocaine abuse, chronic alcoholism, idiopathic pericarditis, asthma, bipolar 1 disorder, who was admitted on 8/3/2022 with AMS. Pt was found sitting in her wheelchair in the middle of the street, confused. She was brought in for evaluation. Pt was alert but totally altered. She was unable to provide any history or reply to any orientation questions. Patient has history of uncontrolled diabetes with polyneuropathy and has been wheelchair-bound since 1/2020. Upon arrival, pt was tachycardic 102, /82 mm Hg, afebrile & agitated. She was unable to tell where she was, who she was, her date of birth or who the president was. CT head - concerning for L MCA infarct. Neurology was consulted. At the time of my visit, patient was alert but partially oriented. Tangential speech was noted with inappropriate laughing and later crying. She followed all commands for me. However, she answered all orientation questions correctly when asked by Dr. Luther Min, but she didn't follow all simple commands for him. Neuro endovascular team (Dr. Luther Min) was consulted for abnormal CT perfusion. No current facility-administered medications on file prior to encounter. Current Outpatient Medications on File Prior to Encounter   Medication Sig Dispense Refill    pregabalin (LYRICA) 200 MG capsule Take 1 capsule by mouth daily for 30 days.  30 capsule 0    insulin glargine (LANTUS SOLOSTAR) 100 UNIT/ML injection pen Inject 30 Units into the skin 2 times daily 5 pen 5    atorvastatin (LIPITOR) 40 MG tablet Take 1 tablet by mouth nightly 90 tablet 3    acetaminophen (TYLENOL) 500 MG tablet Take 2 tablets by mouth 3 times daily as needed for Pain 180 tablet 0    fluticasone (FLONASE) 50 MCG/ACT nasal spray 1 spray by Each Nostril route daily 32 g 10    metFORMIN (GLUCOPHAGE) 1000 MG tablet Take 1 tablet by mouth 2 times daily (with meals) 60 tablet 3    mirtazapine (REMERON) 7.5 MG tablet Take 1 tablet by mouth nightly 30 tablet 3    pantoprazole (PROTONIX) 20 MG tablet Take 1 tablet by mouth 2 times daily (before meals) 30 tablet 3    traZODone (DESYREL) 150 MG tablet Take 1 tablet by mouth nightly 30 tablet 3    venlafaxine (EFFEXOR XR) 150 MG extended release capsule Take 1 capsule by mouth daily (with breakfast) 30 capsule 3    Alcohol Swabs 70 % PADS Use 1 swab 3 times daily as needed 100 each 5    blood glucose monitor strips Test 3 times a day & as needed for symptoms of irregular blood glucose. Dispense sufficient amount for indicated testing frequency plus additional to accommodate PRN testing needs. 100 strip 0    Lancets MISC 1 each by Does not apply route 3 times daily 200 each 0    Insulin Pen Needle ([employer] PEN NEEDLES 31G) 31G X 8 MM MISC 1 each by Does not apply route daily 100 each 3    FREESTYLE LITE strip 1 each by Does not apply route 3 times daily 100 each 5    budesonide-formoterol (SYMBICORT) 80-4.5 MCG/ACT AERO Inhale 2 puffs into the lungs 2 times daily 1 each 3    albuterol sulfate  (90 Base) MCG/ACT inhaler Inhale 2 puffs into the lungs every 4 hours as needed for Wheezing 1 each 5    mupirocin (BACTROBAN) 2 % ointment Apply topically 3 times daily Apply topically to right foot wound two times a day for wound care. Apply to right foot wound, cover with Kerlix and ace wrap. Misc.  Devices MISC 1 PAIR OF DIABETIC SHOES (1 LEFT/ 1 RIGHT)  1-3 PAIRS OF INSERTS (LEFT/ RIGHT) 2 each 0    dicyclomine (BENTYL) 10 MG capsule Take 1 capsule by mouth 4 times daily 120 capsule 3    ibuprofen (ADVIL;MOTRIN) 800 MG tablet Take 1 tablet by mouth every 8 hours as needed for Pain 42 tablet 1    FLOVENT HFA 110 MCG/ACT inhaler Inhale 2 puffs into the lungs 2 times daily 1 Inhaler 3       Allergies: Terri Palmer is allergic to bactrim [sulfamethoxazole-trimethoprim] and adhesive tape.     Past Medical History:   Diagnosis Date    Acid reflux 5/29/2017    Acute cystitis with hematuria 10/11/2016    Acute non-recurrent maxillary sinusitis 11/28/2017    Asthma     Bipolar 1 disorder ([address] 1/4/2018    Bipolar disorder, mixed ([address]     BMI 34.0-34.9,adult 11/28/2017    Cannabis use disorder, severe, dependence ([address] 12/19/2017    Cerebrovascular accident (CVA) ([address] 6/14/2017    Chest pain 11/5/2016    Chronic renal insufficiency     Cocaine abuse ([address] 1/5/2018    COVID-19 virus RNA test result unknown     DDD (degenerative disc disease), cervical     Diabetes mellitus ([address]     Dizziness 6/13/2017    Fibromyalgia     History of stroke 9/9/2017    Homicidal ideation 11/6/2017    Hyperglycemia     Hypertension     Hypotension 1/18/2019    IDDM (insulin dependent diabetes mellitus) 12/21/2015    Lupus ([address]     Migraine     Neuropathy     Neuropathy     Polysubstance abuse ([address] 9/17/2017    Post traumatic stress disorder (PTSD)     Posttraumatic stress disorder 5/29/2017    Recurrent depression ([address] 5/29/2017    Recurrent major depressive disorder, in partial remission ([address] 11/28/2017    Screening mammogram, encounter for 11/28/2017    Severe recurrent major depression with psychotic features ([address] 12/19/2017    Severe recurrent major depression without psychotic features ([address] 12/19/2017    Stroke (cerebrum) ([address] 6/14/2017    Stroke ([address]     per chart notes    Suicidal ideation     Suicidal intent 3/10/2017    Vitamin D deficiency 11/28/2017    White matter changes 6/13/2017       Past Surgical History:   Procedure Laterality Date    ABCESS DRAINAGE      right buttock    ABDOMINAL SURGERY      drain tube    CATARACT EXTRACTION W/  INTRAOCULAR LENS IMPLANT Bilateral     CHEST TUBE INSERTION      FINGER AMPUTATION Left 03/13/2021    LEFT RING FINER AMPUTATION performed by Marianne Barreto MD at [address] Right 10/11/2021    AMPUTATION RIGHT INDEX FINGER performed by Marianne Barreto MD at [facility] Right 1/27/2022    FOOT ABSCESS INCISION AND DRAINAGE  (PULSE LAVAGE, CULTURE SWABS) performed by Benito Marques DPM at [address] [facility] Right 01/27/2022    FOOT ABSCESS INCISION AND DRAINAGE (PULSE LAVAGE, CULTURE SWABS) - Right    HAND SURGERY Right 09/14/2021    I&D INDEX FINGER performed by Marianne Barreto MD at [facility] Right 09/15/2021    I&D INDEX FINGER performed by Marianne Barreto MD at [address]  2/3/2022         LASIK Bilateral        Social History: [address] [place] [address]  reports that she has never smoked. She has never used smokeless tobacco. She reports that she does not currently use alcohol. She reports current drug use. Drugs: Marijuana (Weed) and Cocaine. Family History   Problem Relation Age of Onset    Diabetes Mother     Stroke Mother     Diabetes Father     Diabetes Sister     Diabetes Brother     Other Son         GSW    No Known Problems Sister        ROS:  Constitutional  Negative for fever and chills    HEENT  Negative for ear discharge, ear pain, nosebleed    Eyes  Negative for photophobia, pain and discharge    Respiratory  Negative for hemoptysis and sputum    Cardiovascular  Negative for orthopnea, claudication and PND    Gastrointestinal  Negative for abdominal pain, diarrhea, blood in stool    Musculoskeletal  Negative for joint pain, negative for myalgia    Skin  Negative for rash or itching    Endo/heme/allergies  Negative for polydipsia, environmental allergy    Psychiatric/behavioral  Negative for suicidal ideation.  Patient is not anxious        Medications:     insulin lispro  10 Units SubCUTAneous Once     PRN Meds include:     Objective:   /82   Pulse (!) 102   Temp 99.5 °F (37.5 °C) (Oral) Results   Component Value Date    TRIG 246 (H) 12/30/2020    TRIG 90 12/18/2019    TRIG 102 12/09/2019     Lab Results   Component Value Date    HDL 80 12/30/2020    HDL 72 12/18/2019    HDL 59 12/09/2019     Lab Results   Component Value Date    LDLCHOLESTEROL 146 (H) 12/30/2020    LDLCHOLESTEROL 100 12/18/2019    LDLCHOLESTEROL 70 12/09/2019     Lab Results   Component Value Date    VLDL NOT REPORTED (H) 12/30/2020    VLDL NOT REPORTED 12/18/2019    VLDL NOT REPORTED 12/09/2019     Lab Results   Component Value Date    CHOLHDLRATIO 3.4 12/30/2020    CHOLHDLRATIO 2.6 12/18/2019    CHOLHDLRATIO 2.5 12/09/2019         Diagnostic data reviewed:  CT HEAD (8/3/2022): New ill-defined low attenuation in anterior L MCA distribution; may be due to evolving infarct    in the appropriate clinical setting. Assessment is overall motion degraded with possible   ssociated mild sulcal effacement but no midline shift. CTA HEAD & NECK (8/3/2022):   ICAs, cavernous/supraclinoid segments: 50% stenosis, R>L. CT BRAIN PERFUSION (8/3/2022): Abnormal perfusion in L frontal lobe, likely related to acute to subacute infarction. Perfusion mismatch in L occipital lobe, likely related to artifact. BRAIN LIMITED MRI (8/3/2022): Acute subacute L MCA/frontal infarct. Mild petechial blood products. Otherwise moderate chronic microvascular disease       MRI BRAIN W:     ECHO:                         Impression: Ms. Angela Babcock is a 56 y.o. female admitted with   Acute metabolic encephalopathy in the setting of DKA. Pt awake & alert with tangential speech; able to follow commands for the most part, no evidence of weakness, droop or any other neurological deficit    Tangential speech with inappropriate laughing & later crying. Pt has H/O bipolar 1 disorder, severe depression, cocaine & alcohol abuse. Most probably pt has been off of her psych medicines.  We recommend psych consult     Neuro-endovascular team recommended MRI brain w contrast to r/o tumour     DKA with glucose level 515; management as per IM team    H/O diabetic polyneuropathy with paresthesias in glove & stocking distribution & BLEs weakness; has been using wheel chair since 1/2020    Comorbid conditions - chronic alcoholism, idiopathic pericarditis, asthma, cocaine abuse, MRSA, GERD            Plan:   MRI brain w contrast - ordered for tomorrow AM    Echo, lipid panel, A1c & TSH - ordered    ASA 81 mg QD & Lipitor 40 mg HS - ordered    PT/OT    Will follow with you. Thank you for the consult    Please note that this note was generated using a voice recognition dictation software. Although every effort was made to ensure the accuracy of this automated transcription, some errors in transcription may have occurred.

## 2022-08-03 NOTE — ED PROVIDER NOTES
[facility] ED  Emergency Department  Emergency Medicine Resident Sign-out     Care of Boby Wilburn was assumed from Dr. Blake Davies and is being seen for Altered Mental Status and Hyperglycemia  . The patient's initial evaluation and plan have been discussed with the prior provider who initially evaluated the patient.      EMERGENCY DEPARTMENT COURSE / MEDICAL DECISION MAKING:       MEDICATIONS GIVEN:  Orders Placed This Encounter   Medications    0.9 % sodium chloride infusion    insulin lispro (HUMALOG) injection vial 10 Units       LABS / RADIOLOGY:     Labs Reviewed   ELECTROLYTES PLUS - Abnormal; Notable for the following components:       Result Value    POC Chloride 108 (*)     All other components within normal limits   HGB/HCT - Abnormal; Notable for the following components:    POC Hemoglobin 11.6 (*)     POC Hematocrit 34 (*)     All other components within normal limits   CBC WITH AUTO DIFFERENTIAL - Abnormal; Notable for the following components:    RBC 3.47 (*)     Hemoglobin 10.7 (*)     Hematocrit 32.6 (*)     All other components within normal limits   COMPREHENSIVE METABOLIC PANEL - Abnormal; Notable for the following components:    Glucose 421 (*)     Potassium 3.5 (*)     All other components within normal limits   BETA-HYDROXYBUTYRATE - Abnormal; Notable for the following components:    Beta-Hydroxybutyrate 1.48 (*)     All other components within normal limits   TOX SCR, BLD, ED - Abnormal; Notable for the following components:    Acetaminophen Level <5 (*)     Salicylate Lvl 1 (*)     All other components within normal limits   TROPONIN - Abnormal; Notable for the following components:    Troponin, High Sensitivity 28 (*)     All other components within normal limits   MIXED VENOUS GAS, POINT OF CARE - Abnormal; Notable for the following components:    PCO2, Mixed 39.9 (*)     PO2, Mixed 54.1 (*)     O2 Sat, Mixed 87 (*)     All other components within normal limits   POCT GLUCOSE - Abnormal; Notable for the following components:    POC Glucose 406 (*)     All other components within normal limits   CULTURE, BLOOD 1   CULTURE, BLOOD 1   CALCIUM, IONIC (POC)   AMMONIA   LACTIC ACID   URINALYSIS WITH MICROSCOPIC   URINE DRUG SCREEN   BLOOD GAS, VENOUS   CREATININE W/GFR POINT OF CARE   UREA N (POC)   LACTIC ACID,POINT OF CARE       CT HEAD WO CONTRAST    Result Date: 8/3/2022  EXAMINATION: CT OF THE HEAD WITHOUT CONTRAST  8/3/2022 10:46 am TECHNIQUE: CT of the head was performed without the administration of intravenous contrast. Automated exposure control, iterative reconstruction, and/or weight based adjustment of the mA/kV was utilized to reduce the radiation dose to as low as reasonably achievable. COMPARISON: Head CT 11/04/2021 HISTORY: ORDERING SYSTEM PROVIDED HISTORY: ams TECHNOLOGIST PROVIDED HISTORY: ams Decision Support Exception - unselect if not a suspected or confirmed emergency medical condition->Emergency Medical Condition (MA) Is the patient pregnant?->No Reason for Exam: Altered Mental Status; Hyperglycemia FINDINGS: BRAIN/VENTRICLES: Assessment is motion degraded. Ill-defined regions of low attenuation in the anterior left MCA distribution involving the left frontal lobe extending into the subinsular region. The adjacent sulci are suboptimally delineated, though this may be exacerbated by motion artifact. Patchy regions of low attenuation in the periventricular and subcortical white matter which are mild to moderate and otherwise similar to prior. Mild parenchymal atrophy. No acute intracranial hemorrhage or midline shift. No abnormal extra-axial fluid collection. There is no evidence of hydrocephalus. ORBITS: The visualized portion of the orbits demonstrate no acute abnormality. SINUSES: The visualized paranasal sinuses and mastoid air cells demonstrate no acute abnormality. SOFT TISSUES/SKULL:  No gross acute abnormality of the visualized skull or soft tissues. patient was initially in DKA. However her repeat point-of-care glucose prior to starting insulin infusion was less than 200. Seems as if her glucose came down significantly with fluids and subcutaneous insulin. Insulin infusion held. Potassium replaced. OUTSTANDING TASKS / RECOMMENDATIONS:    Admission     FINAL IMPRESSION:     1. Somnolence    2. Acute ischemic left MCA stroke ([facility]        DISPOSITION:         DISPOSITION:  []  Discharge   []  Transfer -    [x]  Admission -family medicine   []  Against Medical Advice   []  Eloped   FOLLOW-UP: No follow-up provider specified.    DISCHARGE MEDICATIONS: New Prescriptions    No medications on file          Lupe Rizvi MD  Emergency Medicine Resident  [facility]       Lupe Rizvi MD  Resident  08/03/22 [id number]

## 2022-08-03 NOTE — ED PROVIDER NOTES
[id number] ED [address] [facility]  eMERGENCY dEPARTMENT eNCOUnter      Pt Name: Justyn Corcoran  MRN: [id number]  [place] 1965  Date of evaluation: 8/3/22    CHIEF COMPLAINT       Chief Complaint   Patient presents with    Altered Mental Status    Hyperglycemia         HISTORY OF PRESENT ILLNESS   Justyn Corcoran is a 56 y.o. female who presents with altered mental status. Patient found in her wheelchair in the middle the street. Patient brought in for evaluation. Patient had elevated blood glucose. Patient is confused. Patient cannot tell me where she is, who she is, what her birthdate is, who the president is. Patient cannot tell me why she is here. Patient has no complaints. REVIEW OF SYSTEMS       Cannot determine review of systems based on patient's altered mental status.       PAST MEDICAL HISTORY    has a past medical history of Acid reflux, Acute cystitis with hematuria, Acute non-recurrent maxillary sinusitis, Asthma, Bipolar 1 disorder ([address] Bipolar disorder, mixed ([address] BMI 34.0-34.9,adult, Cannabis use disorder, severe, dependence ([address] Cerebrovascular accident (CVA) ([address] Chest pain, Chronic renal insufficiency, Cocaine abuse ([address] COVID-19 virus RNA test result unknown, DDD (degenerative disc disease), cervical, Diabetes mellitus ([address] Dizziness, Fibromyalgia, History of stroke, Homicidal ideation, Hyperglycemia, Hypertension, Hypotension, IDDM (insulin dependent diabetes mellitus), Lupus ([address] Migraine, Neuropathy, Neuropathy, Polysubstance abuse ([address] Post traumatic stress disorder (PTSD), Posttraumatic stress disorder, Recurrent depression ([address] Recurrent major depressive disorder, in partial remission ([address] Screening mammogram, encounter for, Severe recurrent major depression with psychotic features ([address] Severe recurrent major depression without psychotic features ([address] Stroke (cerebrum) ([address] Stroke ([address] Suicidal ideation, Suicidal intent, 1000 MG TABLET    Take 1 tablet by mouth 2 times daily (with meals)    MIRTAZAPINE (REMERON) 7.5 MG TABLET    Take 1 tablet by mouth nightly    MISC. DEVICES MISC    1 PAIR OF DIABETIC SHOES (1 LEFT/ 1 RIGHT)  1-3 PAIRS OF INSERTS (LEFT/ RIGHT)    MUPIROCIN (BACTROBAN) 2 % OINTMENT    Apply topically 3 times daily Apply topically to right foot wound two times a day for wound care. Apply to right foot wound, cover with Kerlix and ace wrap. PANTOPRAZOLE (PROTONIX) 20 MG TABLET    Take 1 tablet by mouth 2 times daily (before meals)    PREGABALIN (LYRICA) 200 MG CAPSULE    Take 1 capsule by mouth daily for 30 days. TRAZODONE (DESYREL) 150 MG TABLET    Take 1 tablet by mouth nightly    VENLAFAXINE (EFFEXOR XR) 150 MG EXTENDED RELEASE CAPSULE    Take 1 capsule by mouth daily (with breakfast)       ALLERGIES     is allergic to bactrim [sulfamethoxazole-trimethoprim] and adhesive tape. FAMILY HISTORY   She indicated that her mother is . She indicated that her father is . She indicated that only one of her two sisters is alive. She indicated that her brother is alive. She indicated that her son is . family history includes Diabetes in her brother, father, mother, and sister; No Known Problems in her sister; Other in her son; Stroke in her mother. SOCIAL HISTORY      reports that she has never smoked. She has never used smokeless tobacco. She reports that she does not currently use alcohol. She reports current drug use. Drugs: Marijuana (Weed) and Cocaine. PHYSICAL EXAM     INITIAL VITALS:  oral temperature is 99.5 °F (37.5 °C). Her blood pressure is 116/82 and her pulse is 102 (abnormal). Her respiration is 15 and oxygen saturation is 94%. Physical Exam  Constitutional:       General: She is not in acute distress. Appearance: She is well-developed. She is not diaphoretic. HENT:      Head: Normocephalic and atraumatic.    Eyes:      General:         Right eye: No discharge. Left eye: No discharge. Pupils: Pupils are equal, round, and reactive to light. Neck:      Vascular: No JVD. Trachea: No tracheal deviation. Cardiovascular:      Rate and Rhythm: Regular rhythm. Tachycardia present. Heart sounds: Normal heart sounds. No murmur heard. No friction rub. No gallop. Pulmonary:      Effort: Pulmonary effort is normal. No respiratory distress. Breath sounds: Normal breath sounds. No wheezing or rales. Chest:      Chest wall: No tenderness. Abdominal:      General: Bowel sounds are normal. There is no distension. Palpations: Abdomen is soft. There is no mass. Tenderness: There is no abdominal tenderness. There is no guarding or rebound. Musculoskeletal:         General: No tenderness. Skin:     General: Skin is warm and dry. Findings: No erythema or rash. Neurological:      Mental Status: She is alert and oriented to person, place, and time. Cranial Nerves: No cranial nerve deficit. Psychiatric:         Behavior: Behavior is agitated. Thought Content: Thought content normal.      Comments: Patient is altered. DIFFERENTIAL DIAGNOSIS/MDM:   AMS, will obtain AMS workup. Likely admit. DIAGNOSTIC RESULTS     EKG: All EKG's are interpreted by the Emergency Department Physician who either signs or Co-signs this chart in the absenceof a cardiologist.    EKG shows sinus tachycardia. Normal axis. No ST elevation or depression. T waves are upright except in lead II. No blocks arrhythmias. Nonspecific EKG.     RADIOLOGY:   I directly visualized the following  images and reviewed theradiologist interpretations:   CT HEAD WO CONTRAST    Result Date: 8/3/2022  EXAMINATION: CT OF THE HEAD WITHOUT CONTRAST  8/3/2022 10:46 am TECHNIQUE: CT of the head was performed without the administration of intravenous contrast. Automated exposure control, iterative reconstruction, and/or weight based adjustment of the mA/kV was utilized to reduce the radiation dose to as low as reasonably achievable. COMPARISON: Head CT 11/04/2021 HISTORY: ORDERING SYSTEM PROVIDED HISTORY: ams TECHNOLOGIST PROVIDED HISTORY: [facility] [address] Decision Support Exception - unselect if not a suspected or confirmed emergency medical condition->Emergency Medical Condition (MA) Is the patient pregnant?->No Reason for Exam: Altered Mental Status; Hyperglycemia FINDINGS: BRAIN/VENTRICLES: Assessment is motion degraded. Ill-defined regions of low attenuation in the anterior left MCA distribution involving the left frontal lobe extending into the subinsular region. The adjacent sulci are suboptimally delineated, though this may be exacerbated by motion artifact. Patchy regions of low attenuation in the periventricular and subcortical white matter which are mild to moderate and otherwise similar to prior. Mild parenchymal atrophy. No acute intracranial hemorrhage or midline shift. No abnormal extra-axial fluid collection. There is no evidence of hydrocephalus. ORBITS: The visualized portion of the orbits demonstrate no acute abnormality. SINUSES: The visualized paranasal sinuses and mastoid air cells demonstrate no acute abnormality. SOFT TISSUES/SKULL:  No gross acute abnormality of the visualized skull or soft tissues. Ill-defined low attenuation in the anterior left MCA distribution which is new from prior comparison imaging and may be due to evolving infarct in the appropriate clinical setting. Assessment is overall motion degraded with possible associated mild sulcal effacement but no midline shift. No acute intracranial hemorrhage.      XR CHEST PORTABLE    Result Date: 8/3/2022  EXAMINATION: ONE XRAY VIEW OF THE CHEST 8/3/2022 12:27 pm COMPARISON: November 4, 2021 HISTORY: ORDERING SYSTEM PROVIDED HISTORY: ams TECHNOLOGIST PROVIDED HISTORY: ams Reason for Exam: Altered mental status/  AP erect/ port FINDINGS: Allowing for differences in technique, stable cardiomediastinal silhouette. No significant pulmonary edema. No convincing lung consolidation or infiltrate. No pleural effusion or pneumothorax. Osseous structures grossly intact.      No acute cardiopulmonary process          ED BEDSIDE ULTRASOUND:   no    LABS:  Labs Reviewed   ELECTROLYTES PLUS - Abnormal; Notable for the following components:       Result Value    POC Chloride 108 (*)     All other components within normal limits   HGB/HCT - Abnormal; Notable for the following components:    POC Hemoglobin 11.6 (*)     POC Hematocrit 34 (*)     All other components within normal limits   CBC WITH AUTO DIFFERENTIAL - Abnormal; Notable for the following components:    RBC 3.47 (*)     Hemoglobin 10.7 (*)     Hematocrit 32.6 (*)     All other components within normal limits   COMPREHENSIVE METABOLIC PANEL - Abnormal; Notable for the following components:    Glucose 421 (*)     Potassium 3.5 (*)     All other components within normal limits   BETA-HYDROXYBUTYRATE - Abnormal; Notable for the following components:    Beta-Hydroxybutyrate 1.48 (*)     All other components within normal limits   TOX SCR, BLD, ED - Abnormal; Notable for the following components:    Acetaminophen Level <5 (*)     Salicylate Lvl 1 (*)     All other components within normal limits   TROPONIN - Abnormal; Notable for the following components:    Troponin, High Sensitivity 28 (*)     All other components within normal limits   MIXED VENOUS GAS, POINT OF CARE - Abnormal; Notable for the following components:    PCO2, Mixed 39.9 (*)     PO2, Mixed 54.1 (*)     O2 Sat, Mixed 87 (*)     All other components within normal limits   POCT GLUCOSE - Abnormal; Notable for the following components:    POC Glucose 406 (*)     All other components within normal limits   CULTURE, BLOOD 1   CULTURE, BLOOD 1   CALCIUM, IONIC (POC)   AMMONIA   LACTIC ACID   URINALYSIS WITH MICROSCOPIC   URINE DRUG SCREEN   BLOOD GAS, VENOUS   CREATININE W/GFR POINT OF CARE UREA N (POC)   LACTIC ACID,POINT OF CARE       Reviewed, as above. EMERGENCY DEPARTMENT COURSE:   Vitals:    Vitals:    08/03/22 1150 08/03/22 1151   BP:  116/82   Pulse: (!) 102    Resp: 15    Temp: 99.5 °F (37.5 °C)    TempSrc: Oral    SpO2: 94%      Reviewed    CRITICAL CARE:  none    CONSULTS:  Medicine      PROCEDURES:  none    FINAL IMPRESSION    ams    DISPOSITION/PLAN   Plan to admit    PATIENT REFERRED TO:  No follow-up provider specified.     DISCHARGE MEDICATIONS:     New Prescriptions    No medications on file       (Please note that portions of this note were completed with a voice recognition program.  Efforts were made to edit thedictations but occasionally words are mis-transcribed.)    William Conn MD  Emergency Medicine              William Conn MD  08/03/22 2710

## 2022-08-04 ENCOUNTER — APPOINTMENT (OUTPATIENT)
Dept: MRI IMAGING | Age: 57
DRG: 420 | End: 2022-08-04
Payer: COMMERCIAL

## 2022-08-04 PROBLEM — R40.0 SOMNOLENCE: Status: ACTIVE | Noted: 2022-08-04

## 2022-08-04 PROBLEM — I63.412 CEREBRAL INFARCTION DUE TO EMBOLISM OF LEFT MIDDLE CEREBRAL ARTERY (HCC): Status: ACTIVE | Noted: 2022-08-04

## 2022-08-04 PROBLEM — G93.9 BRAIN LESION: Status: ACTIVE | Noted: 2017-06-14

## 2022-08-04 PROBLEM — Z79.4 TYPE 2 DIABETES MELLITUS WITH DIABETIC AUTONOMIC NEUROPATHY, WITH LONG-TERM CURRENT USE OF INSULIN (HCC): Status: ACTIVE | Noted: 2022-08-03

## 2022-08-04 PROBLEM — E11.43 TYPE 2 DIABETES MELLITUS WITH DIABETIC AUTONOMIC NEUROPATHY, WITH LONG-TERM CURRENT USE OF INSULIN (HCC): Status: ACTIVE | Noted: 2022-08-03

## 2022-08-04 LAB
-: ABNORMAL
ABSOLUTE EOS #: 0.28 K/UL (ref 0–0.44)
ABSOLUTE IMMATURE GRANULOCYTE: <0.03 K/UL (ref 0–0.3)
ABSOLUTE LYMPH #: 2.53 K/UL (ref 1.1–3.7)
ABSOLUTE MONO #: 0.59 K/UL (ref 0.1–1.2)
AMPHETAMINE SCREEN URINE: NEGATIVE
ANION GAP SERPL CALCULATED.3IONS-SCNC: 11 MMOL/L (ref 9–17)
ANION GAP SERPL CALCULATED.3IONS-SCNC: 11 MMOL/L (ref 9–17)
BACTERIA: ABNORMAL
BARBITURATE SCREEN URINE: NEGATIVE
BASOPHILS # BLD: 1 % (ref 0–2)
BASOPHILS ABSOLUTE: 0.03 K/UL (ref 0–0.2)
BENZODIAZEPINE SCREEN, URINE: POSITIVE
BILIRUBIN URINE: NEGATIVE
BUN BLDV-MCNC: 7 MG/DL (ref 6–20)
BUN BLDV-MCNC: 7 MG/DL (ref 6–20)
CALCIUM SERPL-MCNC: 8.3 MG/DL (ref 8.6–10.4)
CALCIUM SERPL-MCNC: 8.7 MG/DL (ref 8.6–10.4)
CANNABINOID SCREEN URINE: NEGATIVE
CASTS UA: ABNORMAL /LPF (ref 0–2)
CASTS UA: ABNORMAL /LPF (ref 0–2)
CHLORIDE BLD-SCNC: 107 MMOL/L (ref 98–107)
CHLORIDE BLD-SCNC: 108 MMOL/L (ref 98–107)
CHOLESTEROL/HDL RATIO: 3.2
CHOLESTEROL: 126 MG/DL
CO2: 22 MMOL/L (ref 20–31)
CO2: 22 MMOL/L (ref 20–31)
COCAINE METABOLITE, URINE: POSITIVE
COLOR: YELLOW
CREAT SERPL-MCNC: 0.65 MG/DL (ref 0.5–0.9)
CREAT SERPL-MCNC: 0.73 MG/DL (ref 0.5–0.9)
EKG ATRIAL RATE: 102 BPM
EKG P AXIS: 62 DEGREES
EKG P-R INTERVAL: 136 MS
EKG Q-T INTERVAL: 396 MS
EKG QRS DURATION: 84 MS
EKG QTC CALCULATION (BAZETT): 516 MS
EKG R AXIS: -9 DEGREES
EKG T AXIS: -2 DEGREES
EKG VENTRICULAR RATE: 102 BPM
EOSINOPHILS RELATIVE PERCENT: 5 % (ref 1–4)
EPITHELIAL CELLS UA: ABNORMAL /HPF (ref 0–5)
ESTIMATED AVERAGE GLUCOSE: 280 MG/DL
FENTANYL URINE: NEGATIVE
GFR AFRICAN AMERICAN: >60 ML/MIN
GFR AFRICAN AMERICAN: >60 ML/MIN
GFR NON-AFRICAN AMERICAN: >60 ML/MIN
GFR NON-AFRICAN AMERICAN: >60 ML/MIN
GFR SERPL CREATININE-BSD FRML MDRD: ABNORMAL ML/MIN/{1.73_M2}
GFR SERPL CREATININE-BSD FRML MDRD: ABNORMAL ML/MIN/{1.73_M2}
GLUCOSE BLD-MCNC: 162 MG/DL (ref 70–99)
GLUCOSE BLD-MCNC: 266 MG/DL (ref 65–105)
GLUCOSE BLD-MCNC: 273 MG/DL (ref 65–105)
GLUCOSE BLD-MCNC: 339 MG/DL (ref 70–99)
GLUCOSE BLD-MCNC: 341 MG/DL (ref 65–105)
GLUCOSE BLD-MCNC: 465 MG/DL (ref 65–105)
GLUCOSE URINE: ABNORMAL
HBA1C MFR BLD: 11.4 % (ref 4–6)
HCT VFR BLD CALC: 31.8 % (ref 36.3–47.1)
HDLC SERPL-MCNC: 40 MG/DL
HEMOGLOBIN: 10.5 G/DL (ref 11.9–15.1)
HOMOCYSTEINE: 10.1 UMOL/L
IMMATURE GRANULOCYTES: 0 %
KETONES, URINE: NEGATIVE
LDL CHOLESTEROL: 62 MG/DL (ref 0–130)
LEUKOCYTE ESTERASE, URINE: NEGATIVE
LYMPHOCYTES # BLD: 46 % (ref 24–43)
MAGNESIUM: 1.7 MG/DL (ref 1.6–2.6)
MAGNESIUM: 1.8 MG/DL (ref 1.6–2.6)
MCH RBC QN AUTO: 32 PG (ref 25.2–33.5)
MCHC RBC AUTO-ENTMCNC: 33 G/DL (ref 28.4–34.8)
MCV RBC AUTO: 97 FL (ref 82.6–102.9)
METHADONE SCREEN, URINE: NEGATIVE
MONOCYTES # BLD: 11 % (ref 3–12)
NITRITE, URINE: NEGATIVE
NRBC AUTOMATED: 0 PER 100 WBC
OPIATES, URINE: NEGATIVE
OXYCODONE SCREEN URINE: NEGATIVE
PDW BLD-RTO: 12.1 % (ref 11.8–14.4)
PH UA: 5.5 (ref 5–8)
PHENCYCLIDINE, URINE: NEGATIVE
PLATELET # BLD: 297 K/UL (ref 138–453)
PMV BLD AUTO: 11.5 FL (ref 8.1–13.5)
POTASSIUM SERPL-SCNC: 3.2 MMOL/L (ref 3.7–5.3)
POTASSIUM SERPL-SCNC: 3.4 MMOL/L (ref 3.7–5.3)
PROTEIN UA: NEGATIVE
RBC # BLD: 3.28 M/UL (ref 3.95–5.11)
RBC UA: ABNORMAL /HPF (ref 0–2)
SEG NEUTROPHILS: 38 % (ref 36–65)
SEGMENTED NEUTROPHILS ABSOLUTE COUNT: 2.1 K/UL (ref 1.5–8.1)
SODIUM BLD-SCNC: 140 MMOL/L (ref 135–144)
SODIUM BLD-SCNC: 141 MMOL/L (ref 135–144)
SPECIFIC GRAVITY UA: 1.06 (ref 1–1.03)
TEST INFORMATION: ABNORMAL
TRIGL SERPL-MCNC: 119 MG/DL
TSH SERPL DL<=0.05 MIU/L-ACNC: 1.1 UIU/ML (ref 0.3–5)
TURBIDITY: CLEAR
URINE HGB: NEGATIVE
UROBILINOGEN, URINE: NORMAL
WBC # BLD: 5.5 K/UL (ref 3.5–11.3)
WBC UA: ABNORMAL /HPF (ref 0–5)
YEAST: ABNORMAL

## 2022-08-04 PROCEDURE — 86147 CARDIOLIPIN ANTIBODY EA IG: CPT

## 2022-08-04 PROCEDURE — 85613 RUSSELL VIPER VENOM DILUTED: CPT

## 2022-08-04 PROCEDURE — 94760 N-INVAS EAR/PLS OXIMETRY 1: CPT

## 2022-08-04 PROCEDURE — 83735 ASSAY OF MAGNESIUM: CPT

## 2022-08-04 PROCEDURE — 99222 1ST HOSP IP/OBS MODERATE 55: CPT | Performed by: NEUROLOGICAL SURGERY

## 2022-08-04 PROCEDURE — 84443 ASSAY THYROID STIM HORMONE: CPT

## 2022-08-04 PROCEDURE — 85610 PROTHROMBIN TIME: CPT

## 2022-08-04 PROCEDURE — 99233 SBSQ HOSP IP/OBS HIGH 50: CPT | Performed by: PSYCHIATRY & NEUROLOGY

## 2022-08-04 PROCEDURE — 85307 ASSAY ACTIVATED PROTEIN C: CPT

## 2022-08-04 PROCEDURE — 80048 BASIC METABOLIC PNL TOTAL CA: CPT

## 2022-08-04 PROCEDURE — 36415 COLL VENOUS BLD VENIPUNCTURE: CPT

## 2022-08-04 PROCEDURE — 70552 MRI BRAIN STEM W/DYE: CPT

## 2022-08-04 PROCEDURE — 83090 ASSAY OF HOMOCYSTEINE: CPT

## 2022-08-04 PROCEDURE — 81291 MTHFR GENE: CPT

## 2022-08-04 PROCEDURE — 6370000000 HC RX 637 (ALT 250 FOR IP): Performed by: STUDENT IN AN ORGANIZED HEALTH CARE EDUCATION/TRAINING PROGRAM

## 2022-08-04 PROCEDURE — 6360000004 HC RX CONTRAST MEDICATION: Performed by: NURSE PRACTITIONER

## 2022-08-04 PROCEDURE — 81001 URINALYSIS AUTO W/SCOPE: CPT

## 2022-08-04 PROCEDURE — 80307 DRUG TEST PRSMV CHEM ANLYZR: CPT

## 2022-08-04 PROCEDURE — A9576 INJ PROHANCE MULTIPACK: HCPCS | Performed by: NURSE PRACTITIONER

## 2022-08-04 PROCEDURE — 83036 HEMOGLOBIN GLYCOSYLATED A1C: CPT

## 2022-08-04 PROCEDURE — 85025 COMPLETE CBC W/AUTO DIFF WBC: CPT

## 2022-08-04 PROCEDURE — 81241 F5 GENE: CPT

## 2022-08-04 PROCEDURE — 85300 ANTITHROMBIN III ACTIVITY: CPT

## 2022-08-04 PROCEDURE — 81240 F2 GENE: CPT

## 2022-08-04 PROCEDURE — 85305 CLOT INHIBIT PROT S TOTAL: CPT

## 2022-08-04 PROCEDURE — 2060000000 HC ICU INTERMEDIATE R&B

## 2022-08-04 PROCEDURE — 99223 1ST HOSP IP/OBS HIGH 75: CPT | Performed by: FAMILY MEDICINE

## 2022-08-04 PROCEDURE — 6360000002 HC RX W HCPCS: Performed by: STUDENT IN AN ORGANIZED HEALTH CARE EDUCATION/TRAINING PROGRAM

## 2022-08-04 PROCEDURE — 85302 CLOT INHIBIT PROT C ANTIGEN: CPT

## 2022-08-04 PROCEDURE — 80061 LIPID PANEL: CPT

## 2022-08-04 PROCEDURE — 85230 CLOT FACTOR VII PROCONVERTIN: CPT

## 2022-08-04 PROCEDURE — 2580000003 HC RX 258: Performed by: STUDENT IN AN ORGANIZED HEALTH CARE EDUCATION/TRAINING PROGRAM

## 2022-08-04 PROCEDURE — 85730 THROMBOPLASTIN TIME PARTIAL: CPT

## 2022-08-04 PROCEDURE — 82947 ASSAY GLUCOSE BLOOD QUANT: CPT

## 2022-08-04 PROCEDURE — 93010 ELECTROCARDIOGRAM REPORT: CPT | Performed by: INTERNAL MEDICINE

## 2022-08-04 PROCEDURE — 85240 CLOT FACTOR VIII AHG 1 STAGE: CPT

## 2022-08-04 RX ORDER — SODIUM CHLORIDE 9 MG/ML
INJECTION, SOLUTION INTRAVENOUS CONTINUOUS
Status: DISCONTINUED | OUTPATIENT
Start: 2022-08-04 | End: 2022-08-05 | Stop reason: HOSPADM

## 2022-08-04 RX ORDER — SODIUM CHLORIDE 0.9 % (FLUSH) 0.9 %
10 SYRINGE (ML) INJECTION PRN
Status: DISCONTINUED | OUTPATIENT
Start: 2022-08-04 | End: 2022-08-05 | Stop reason: HOSPADM

## 2022-08-04 RX ORDER — INSULIN LISPRO 100 [IU]/ML
0-4 INJECTION, SOLUTION INTRAVENOUS; SUBCUTANEOUS
Status: DISCONTINUED | OUTPATIENT
Start: 2022-08-04 | End: 2022-08-05 | Stop reason: HOSPADM

## 2022-08-04 RX ORDER — INSULIN LISPRO 100 [IU]/ML
0-4 INJECTION, SOLUTION INTRAVENOUS; SUBCUTANEOUS NIGHTLY
Status: DISCONTINUED | OUTPATIENT
Start: 2022-08-04 | End: 2022-08-05 | Stop reason: HOSPADM

## 2022-08-04 RX ADMIN — PANTOPRAZOLE SODIUM 20 MG: 20 TABLET, DELAYED RELEASE ORAL at 18:19

## 2022-08-04 RX ADMIN — INSULIN GLARGINE 30 UNITS: 100 INJECTION, SOLUTION SUBCUTANEOUS at 08:50

## 2022-08-04 RX ADMIN — INSULIN LISPRO 2 UNITS: 100 INJECTION, SOLUTION INTRAVENOUS; SUBCUTANEOUS at 18:19

## 2022-08-04 RX ADMIN — TRAZODONE HYDROCHLORIDE 150 MG: 50 TABLET ORAL at 21:28

## 2022-08-04 RX ADMIN — INSULIN LISPRO 2 UNITS: 100 INJECTION, SOLUTION INTRAVENOUS; SUBCUTANEOUS at 13:30

## 2022-08-04 RX ADMIN — DICYCLOMINE HYDROCHLORIDE 10 MG: 10 CAPSULE ORAL at 21:28

## 2022-08-04 RX ADMIN — DESMOPRESSIN ACETATE 40 MG: 0.2 TABLET ORAL at 00:33

## 2022-08-04 RX ADMIN — HEPARIN SODIUM 5000 UNITS: 5000 INJECTION INTRAVENOUS; SUBCUTANEOUS at 00:34

## 2022-08-04 RX ADMIN — DICYCLOMINE HYDROCHLORIDE 10 MG: 10 CAPSULE ORAL at 18:19

## 2022-08-04 RX ADMIN — DESMOPRESSIN ACETATE 40 MG: 0.2 TABLET ORAL at 21:29

## 2022-08-04 RX ADMIN — MIRTAZAPINE 7.5 MG: 15 TABLET, FILM COATED ORAL at 21:28

## 2022-08-04 RX ADMIN — SODIUM CHLORIDE: 9 INJECTION, SOLUTION INTRAVENOUS at 13:40

## 2022-08-04 RX ADMIN — MIRTAZAPINE 7.5 MG: 15 TABLET, FILM COATED ORAL at 00:33

## 2022-08-04 RX ADMIN — PANTOPRAZOLE SODIUM 20 MG: 20 TABLET, DELAYED RELEASE ORAL at 05:22

## 2022-08-04 RX ADMIN — TRAZODONE HYDROCHLORIDE 150 MG: 50 TABLET ORAL at 00:33

## 2022-08-04 RX ADMIN — GADOTERIDOL 20 ML: 279.3 INJECTION, SOLUTION INTRAVENOUS at 17:30

## 2022-08-04 RX ADMIN — INSULIN LISPRO 3 UNITS: 100 INJECTION, SOLUTION INTRAVENOUS; SUBCUTANEOUS at 08:50

## 2022-08-04 RX ADMIN — DICYCLOMINE HYDROCHLORIDE 10 MG: 10 CAPSULE ORAL at 00:34

## 2022-08-04 ASSESSMENT — ENCOUNTER SYMPTOMS
ABDOMINAL PAIN: 0
CHEST TIGHTNESS: 0

## 2022-08-04 NOTE — CARE COORDINATION
Met with pt, who was now awake, eating lunch. Consult received for safe housing and following to complete PHQ-9 screen. Pt has been seen by SW in the past  Pt reports she lives alone and still lives at the [facility]. She denies any safety concerns. In the past, pt would think her neighbors were sneaking in and taking her belongings, her meds, etc.  Pt denies any of that is happening now. Pt stated she has a dtr and stated her dtr is supportive. She was unsure when she last talked with her. She could not identify any other support systems and would often say \"I don't know\". Pt stated she does have a hx of depression and is on meds but was not sure if they were helpful. She denies any SI. She did not know if she followed with any HC - mentioned [facility] as she had stated in the past she followed there - pt unsure but  was agreeable to SW calling them. [facility] and spoke with [facility]look -she stated pt was not a client there. Pt stated she was unsure what happened yest or why she was hospitalized. She denies any alcohol/drug use. Pt is not old enough for an APS ref. Noted psych consulted. Patient Health Questionnaire-9 (PHQ-9)    Over the last 2 weeks, how often have you been bothered by any of the following problems? 1. Little Interest or pleasure in doing things? [] Not at all  [x] Several Days  [] More than half the day  []  Nearly every day    2. Feeling down, depressed or hopeless? [] Not at all  [x] Several Days  [] More than half the day  []  Nearly every day    3. Trouble falling or staying asleep, or sleeping too much? [x] Not at all  [] Several Days  [] More than half the day  []  Nearly every day    4. Feeling tired or having little energy? [x] Not at all  [] Several Days  [] More than half the day  []  Nearly every day    5. Poor apettite or overeating? [x] Not at all  [] Several Days  [] More than half the day  []  Nearly every day    6.  Feeling

## 2022-08-04 NOTE — PLAN OF CARE
Patient admitted from ED, patient pivoted by herself into bed with much coaxing. Patient is very uncooperative and labile in mood. Patient does let me place the cardiac monitor on her and take her vital signs. Patient lets me do limited general and neuro assessment. Patient laying on side with blanket over her head.  Will continue to monitor

## 2022-08-04 NOTE — ED NOTES
admitting resident states not to start insulin recheck FSBS at [address] [place] [address]  08/03/22 2035

## 2022-08-04 NOTE — PROGRESS NOTES
Tried to call for patient to echo, RN stated she is currently refusing testing. She checked with patient and patient refused echo. RN to call us if anything changes.

## 2022-08-04 NOTE — PROGRESS NOTES
[address] [facility]  Progress Note    Date:   8/4/2022  Patient name:  Conchis Kenney  Date of admission:  8/3/2022 11:47 AM  MRN:   [id number]  YOB: 1965    SUBJECTIVE/Last 24 hours update:     Patient seen and examined at the bed side , no new acute events overnight   Vitals stable. Blood glucose up to 341  Pt initially refusing MRI with contrast as per nurse. Was initially uncooperative to questions. Came back at a later time during rounds and she was responsive to our questions. Was in tears regarding having no memory of what happened prior to admission. Mood improved soon after continued conversation and agreed to get the MRI with contrast done. Psych and neurology, neurosurgery and neuro-endovascular surgery consulted. HPI:   The patient is a 56 y.o.  female with history of uncontrolled DM, asthma, bipolar disorder, morbid obesity who presented with with altered mental status. Patient was found in the middle of the road in her wheelchair. Was brought to the ED by EMS for altered mental status. Patient is a poor historian, laying in bed with a blanket over her head, not very cooperative with answering questions. Denies any complaints at present. At ER , basic work-up showed hyperglycemia 406, hypokalemia(K3.5), Trop 28. Beta hydroxybutyrate 1.48. No leukocytosis, hemoglobin 10.7. VBG was done and showed pH of 7.3. Ammonia level was checked and was unremarkable. CXR and KUB were negative. Patient was given 10 units of Humalog as part of a plan to start DKA protocol however glucose dropped to 172 and it was plan to continue with subcutaneous insulin for hyperglycemia management. CT head that showed left frontal infarct, neurology and neurovascular surgery was consulted, CTA head and MRI brain were done and showed possible subacute stroke versus possible brain tumor.      and she is admitted to the hospital for the (Right, 09/14/2021); Hand surgery (Right, 09/15/2021); Finger amputation (Right, 10/11/2021); Foot surgery (Right, 01/27/2022); Foot Debridement (Right, 1/27/2022); and Insert Midline Catheter (2/3/2022). SOCIAL HISTORY:      reports that she has never smoked. She has never used smokeless tobacco. She reports that she does not currently use alcohol. She reports current drug use. Drugs: Marijuana (Weed) and Cocaine. FAMILY HISTORY:     family history includes Diabetes in her brother, father, mother, and sister; No Known Problems in her sister; Other in her son; Stroke in her mother. HOME MEDICATIONS:      Prior to Admission medications    Medication Sig Start Date End Date Taking? Authorizing Provider   pregabalin (LYRICA) 200 MG capsule Take 1 capsule by mouth daily for 30 days.  4/22/22 5/22/22  Ismael Patton MD   insulin glargine (LANTUS SOLOSTAR) 100 UNIT/ML injection pen Inject 30 Units into the skin 2 times daily 4/22/22   Ismael Patton MD   atorvastatin (LIPITOR) 40 MG tablet Take 1 tablet by mouth nightly 4/22/22 7/21/22  Ismael Patton MD   acetaminophen (TYLENOL) 500 MG tablet Take 2 tablets by mouth 3 times daily as needed for Pain 3/15/22   Ismael Patton MD   fluticasone (FLONASE) 50 MCG/ACT nasal spray 1 spray by Each Nostril route daily 3/15/22   Ismael Patton MD   metFORMIN (GLUCOPHAGE) 1000 MG tablet Take 1 tablet by mouth 2 times daily (with meals) 3/15/22   Ismael Patton MD   mirtazapine (REMERON) 7.5 MG tablet Take 1 tablet by mouth nightly 3/15/22   Ismael Patton MD   pantoprazole (PROTONIX) 20 MG tablet Take 1 tablet by mouth 2 times daily (before meals) 3/15/22   Ismael Patton MD   traZODone (DESYREL) 150 MG tablet Take 1 tablet by mouth nightly 3/15/22   Ismael Patton MD   venlafaxine (EFFEXOR XR) 150 MG extended release capsule Take 1 capsule by mouth daily (with breakfast) 3/15/22   Ismael Patton MD   Alcohol Swabs 70 % PADS Use 1 swab 3 times daily as needed 3/15/22   Leo Rodriguez MD   blood glucose monitor strips Test 3 times a day & as needed for symptoms of irregular blood glucose. Dispense sufficient amount for indicated testing frequency plus additional to accommodate PRN testing needs. 3/15/22   Leo Rodriguez MD   Lancets MISC 1 each by Does not apply route 3 times daily 3/15/22   Leo Rodriguez MD   Insulin Pen Needle ([employer] PEN NEEDLES 31G) 31G X 8 MM MISC 1 each by Does not apply route daily 3/15/22   Leo Rodriguez MD   FREESTYLE LITE strip 1 each by Does not apply route 3 times daily 3/15/22   Leo Rodriguez MD   budesonide-formoterol (SYMBICORT) 80-4.5 MCG/ACT AERO Inhale 2 puffs into the lungs 2 times daily 3/15/22   Leo Rodriguez MD   albuterol sulfate  (90 Base) MCG/ACT inhaler Inhale 2 puffs into the lungs every 4 hours as needed for Wheezing 3/15/22 4/15/22  Leo Rodriguez MD   mupirocin (BACTROBAN) 2 % ointment Apply topically 3 times daily Apply topically to right foot wound two times a day for wound care. Apply to right foot wound, cover with Kerlix and ace wrap. Historical Provider, MD   Misc.  Devices MISC 1 PAIR OF DIABETIC SHOES (1 LEFT/ 1 RIGHT)  1-3 PAIRS OF INSERTS (LEFT/ RIGHT) 2/15/22   Benito Marques DPM   dicyclomine (BENTYL) 10 MG capsule Take 1 capsule by mouth 4 times daily 12/17/21   Leo Rodriguez MD   ibuprofen (ADVIL;MOTRIN) 800 MG tablet Take 1 tablet by mouth every 8 hours as needed for Pain 11/15/21 11/29/21  Natalia Du MD   FLOVENT  MCG/ACT inhaler Inhale 2 puffs into the lungs 2 times daily 10/20/20   Leo Rodriguez MD       ALLERGIES:     Bactrim [sulfamethoxazole-trimethoprim] and Adhesive tape      OBJECTIVE:       Vitals:    08/04/22 0400 08/04/22 0550 08/04/22 0600 08/04/22 0640   BP: (!) 92/48  (!) 87/53 (!) 90/59   Pulse: 89 87 90 83   Resp: 16 (!) 0 19 13   Temp: 98.3 °F (36.8 °C)   97.9 °F (36.6 °C)   TempSrc: Oral   Temporal   SpO2: 95% 98% 94%   Weight:             Intake/Output Summary (Last 24 hours) at 8/4/2022 0920  Last data filed at 8/4/2022 0640  Gross per 24 hour   Intake 500 ml   Output --   Net 500 ml       PHYSICAL EXAM:  Physical Exam  Cardiovascular:      Rate and Rhythm: Normal rate and regular rhythm. Pulses: Normal pulses. Heart sounds: Normal heart sounds. Pulmonary:      Breath sounds: Normal breath sounds. Musculoskeletal:      Right lower leg: No edema. Left lower leg: No edema. Neurological:      General: No focal deficit present. Mental Status: She is alert.        DIAGNOSTICS:     Laboratory Testing:    Recent Results (from the past 24 hour(s))   Mixed Venous Gas, POC    Collection Time: 08/03/22 12:08 PM   Result Value Ref Range    PH MIXED 7.378 7.310 - 7.410    PCO2, Mixed 39.9 (L) 42.0 - 52.0 mm Hg    PO2, Mixed 54.1 (H) 35.0 - 45.0 mm Hg    HCO3, Mixed 23.5 23.0 - 29.0 mmol/L    Negative Base Excess, Mixed 2 0.0 - 2.0    O2 Sat, Mixed 87 (H) 60.0 - 80.0 %   ELECTROLYTES PLUS    Collection Time: 08/03/22 12:08 PM   Result Value Ref Range    POC Sodium 143 138 - 146 mmol/L    POC Potassium 3.6 3.5 - 4.5 mmol/L    POC Chloride 108 (H) 98 - 107 mmol/L    POC TCO2 24 22 - 30 mmol/L    Anion Gap 12 7 - 16 mmol/L   Hemoglobin and hematocrit, blood    Collection Time: 08/03/22 12:08 PM   Result Value Ref Range    POC Hemoglobin 11.6 (L) 12.0 - 16.0 g/dL    POC Hematocrit 34 (L) 36 - 46 %   Creatinine W/GFR Point of Care    Collection Time: 08/03/22 12:08 PM   Result Value Ref Range    POC Creatinine 0.63 0.51 - 1.19 mg/dL    GFR Comment >60 >60 mL/min    GFR Non-African American >60 >60 mL/min    GFR Comment         CALCIUM, IONIC (POC)    Collection Time: 08/03/22 12:08 PM   Result Value Ref Range    POC Ionized Calcium 1.19 1.15 - 1.33 mmol/L   POCT urea (BUN)    Collection Time: 08/03/22 12:08 PM   Result Value Ref Range    POC BUN 9 8 - 26 mg/dL   Lactic Acid, POC    Collection Time: 08/03/22 12:08 PM Result Value Ref Range    POC Lactic Acid 0.98 0.56 - 1.39 mmol/L   POCT Glucose    Collection Time: 08/03/22 12:08 PM   Result Value Ref Range    POC Glucose 406 (HH) 74 - 100 mg/dL   CBC with Auto Differential    Collection Time: 08/03/22 12:14 PM   Result Value Ref Range    WBC 7.6 3.5 - 11.3 k/uL    RBC 3.47 (L) 3.95 - 5.11 m/uL    Hemoglobin 10.7 (L) 11.9 - 15.1 g/dL    Hematocrit 32.6 (L) 36.3 - 47.1 %    MCV 93.9 82.6 - 102.9 fL    MCH 30.8 25.2 - 33.5 pg    MCHC 32.8 28.4 - 34.8 g/dL    RDW 12.0 11.8 - 14.4 %    Platelets 118 112 - 613 k/uL    MPV 11.2 8.1 - 13.5 fL    NRBC Automated 0.0 0.0 per 100 WBC    Seg Neutrophils 59 36 - 65 %    Lymphocytes 28 24 - 43 %    Monocytes 10 3 - 12 %    Eosinophils % 2 1 - 4 %    Basophils 1 0 - 2 %    Immature Granulocytes 0 0 %    Segs Absolute 4.47 1.50 - 8.10 k/uL    Absolute Lymph # 2.13 1.10 - 3.70 k/uL    Absolute Mono # 0.72 0.10 - 1.20 k/uL    Absolute Eos # 0.16 0.00 - 0.44 k/uL    Basophils Absolute 0.06 0.00 - 0.20 k/uL    Absolute Immature Granulocyte <0.03 0.00 - 0.30 k/uL   Comprehensive Metabolic Panel    Collection Time: 08/03/22 12:14 PM   Result Value Ref Range    Glucose 421 (HH) 70 - 99 mg/dL    BUN 8 6 - 20 mg/dL    Creatinine 0.76 0.50 - 0.90 mg/dL    Calcium 9.2 8.6 - 10.4 mg/dL    Sodium 138 135 - 144 mmol/L    Potassium 3.5 (L) 3.7 - 5.3 mmol/L    Chloride 103 98 - 107 mmol/L    CO2 22 20 - 31 mmol/L    Anion Gap 13 9 - 17 mmol/L    Alkaline Phosphatase 103 35 - 104 U/L    ALT 10 5 - 33 U/L    AST 15 <32 U/L    Total Bilirubin 0.33 0.3 - 1.2 mg/dL    Total Protein 6.9 6.4 - 8.3 g/dL    Albumin 3.8 3.5 - 5.2 g/dL    Albumin/Globulin Ratio 1.2 1.0 - 2.5    GFR Non-African American >60 >60 mL/min    GFR African American >60 >60 mL/min    GFR Comment         Beta-Hydroxybutyrate    Collection Time: 08/03/22 12:14 PM   Result Value Ref Range    Beta-Hydroxybutyrate 1.48 (H) 0.02 - 0.27 mmol/L   Ammonia    Collection Time: 08/03/22 12:14 PM   Result Value Ref Range    Ammonia 28 11 - 51 umol/L   TOX SCR, BLD, ED    Collection Time: 08/03/22 12:14 PM   Result Value Ref Range    Acetaminophen Level <5 (L) 10 - 30 ug/mL    Ethanol <10 <10 mg/dL    Ethanol percent <6.218 <5.659 %    Salicylate Lvl 1 (L) 3 - 10 mg/dL    Toxic Tricyclic Sc,Blood NEGATIVE NEGATIVE   Troponin    Collection Time: 08/03/22 12:14 PM   Result Value Ref Range    Troponin, High Sensitivity 28 (H) 0 - 14 ng/L   Lactic Acid    Collection Time: 08/03/22 12:15 PM   Result Value Ref Range    Lactic Acid, Whole Blood 1.2 0.7 - 2.1 mmol/L   Culture, Blood 1    Collection Time: 08/03/22 12:18 PM    Specimen: Blood   Result Value Ref Range    Specimen Description . BLOOD     Culture NO GROWTH 12 HOURS    EKG 12 Lead    Collection Time: 08/03/22 12:21 PM   Result Value Ref Range    Ventricular Rate 102 BPM    Atrial Rate 102 BPM    P-R Interval 136 ms    QRS Duration 84 ms    Q-T Interval 396 ms    QTc Calculation (Bazett) 516 ms    P Axis 62 degrees    R Axis -9 degrees    T Axis -2 degrees   BLOOD GAS, VENOUS    Collection Time: 08/03/22  3:52 PM   Result Value Ref Range    pH, Alex 7.280 (L) 7.320 - 7.420    pCO2, Alex 50.9 39 - 55    pO2, Alex 40.4 30 - 50    HCO3, Venous 23.2 (L) 24 - 30 mmol/L    Negative Base Excess, Alex 3.3 (H) 0.0 - 2.0 mmol/L    O2 Sat, Alex 65.6 60.0 - 85.0 %    Carboxyhemoglobin 2.2 0 - 5 %    Pt Temp 37.0     FIO2 INFORMATION NOT PROVIDED    POC Glucose Fingerstick    Collection Time: 08/03/22  5:47 PM   Result Value Ref Range    POC Glucose 515 (HH) 65 - 105 mg/dL   POC Glucose Fingerstick    Collection Time: 08/03/22  7:54 PM   Result Value Ref Range    POC Glucose 172 (H) 65 - 105 mg/dL   BLOOD GAS, VENOUS    Collection Time: 08/03/22  8:17 PM   Result Value Ref Range    pH, Alex 7.300 (L) 7.320 - 7.420    pCO2, Alex 50.3 39 - 55    pO2, Alex 47.9 30 - 50    HCO3, Venous 24.0 24 - 30 mmol/L    Negative Base Excess, Alex 2.1 (H) 0.0 - 2.0 mmol/L    O2 Sat, Alex 77.7 60.0 - 85.0 % Carboxyhemoglobin 2.4 0 - 5 %    Pt Temp 37.0     FIO2 INFORMATION NOT PROVIDED    POC Glucose Fingerstick    Collection Time: 08/03/22 11:01 PM   Result Value Ref Range    POC Glucose 169 (H) 65 - 105 mg/dL   TSH with Reflex    Collection Time: 08/04/22 12:21 AM   Result Value Ref Range    TSH 1.10 0.30 - 5.00 uIU/mL   Lupus Anticoagulant    Collection Time: 08/04/22 12:21 AM   Result Value Ref Range    Anticardiolipin IgA PENDING APL    Anticardiolipin IgG PENDING GPL    Cardiolipin Ab IgM PENDING MPL    Dilute Viper Venom Time PENDING     Lupus Anticoag PENDING     Protime 10.7 9.1 - 12.3 sec    INR 1.0     PTT 23.2 20.5 - 30.5 sec   Homocysteine    Collection Time: 08/04/22 12:21 AM   Result Value Ref Range    Homocysteine 10.1 <15.0 umol/L   Basic Metabolic Panel w/ Reflex to MG    Collection Time: 08/04/22 12:21 AM   Result Value Ref Range    Glucose 162 (H) 70 - 99 mg/dL    BUN 7 6 - 20 mg/dL    Creatinine 0.65 0.50 - 0.90 mg/dL    Calcium 8.7 8.6 - 10.4 mg/dL    Sodium 140 135 - 144 mmol/L    Potassium 3.2 (L) 3.7 - 5.3 mmol/L    Chloride 107 98 - 107 mmol/L    CO2 22 20 - 31 mmol/L    Anion Gap 11 9 - 17 mmol/L    GFR Non-African American >60 >60 mL/min    GFR African American >60 >60 mL/min    GFR Comment         Magnesium    Collection Time: 08/04/22 12:21 AM   Result Value Ref Range    Magnesium 1.8 1.6 - 2.6 mg/dL   LIPID PANEL    Collection Time: 08/04/22  6:02 AM   Result Value Ref Range    Cholesterol 126 <200 mg/dL    HDL 40 (L) >40 mg/dL    LDL Cholesterol 62 0 - 130 mg/dL    Chol/HDL Ratio 3.2 <5    Triglycerides 119 <150 mg/dL   CBC with Auto Differential    Collection Time: 08/04/22  6:02 AM   Result Value Ref Range    WBC 5.5 3.5 - 11.3 k/uL    RBC 3.28 (L) 3.95 - 5.11 m/uL    Hemoglobin 10.5 (L) 11.9 - 15.1 g/dL    Hematocrit 31.8 (L) 36.3 - 47.1 %    MCV 97.0 82.6 - 102.9 fL    MCH 32.0 25.2 - 33.5 pg    MCHC 33.0 28.4 - 34.8 g/dL    RDW 12.1 11.8 - 14.4 %    Platelets 973 167 - 147 k/uL MPV 11.5 8.1 - 13.5 fL    NRBC Automated 0.0 0.0 per 100 WBC    Seg Neutrophils 38 36 - 65 %    Lymphocytes 46 (H) 24 - 43 %    Monocytes 11 3 - 12 %    Eosinophils % 5 (H) 1 - 4 %    Basophils 1 0 - 2 %    Immature Granulocytes 0 0 %    Segs Absolute 2.10 1.50 - 8.10 k/uL    Absolute Lymph # 2.53 1.10 - 3.70 k/uL    Absolute Mono # 0.59 0.10 - 1.20 k/uL    Absolute Eos # 0.28 0.00 - 0.44 k/uL    Basophils Absolute 0.03 0.00 - 0.20 k/uL    Absolute Immature Granulocyte <0.03 0.00 - 0.30 k/uL   Basic Metabolic Panel w/ Reflex to MG    Collection Time: 08/04/22  6:02 AM   Result Value Ref Range    Glucose 339 (H) 70 - 99 mg/dL    BUN 7 6 - 20 mg/dL    Creatinine 0.73 0.50 - 0.90 mg/dL    Calcium 8.3 (L) 8.6 - 10.4 mg/dL    Sodium 141 135 - 144 mmol/L    Potassium 3.4 (L) 3.7 - 5.3 mmol/L    Chloride 108 (H) 98 - 107 mmol/L    CO2 22 20 - 31 mmol/L    Anion Gap 11 9 - 17 mmol/L    GFR Non-African American >60 >60 mL/min    GFR African American >60 >60 mL/min    GFR Comment         Magnesium    Collection Time: 08/04/22  6:02 AM   Result Value Ref Range    Magnesium 1.7 1.6 - 2.6 mg/dL   POC Glucose Fingerstick    Collection Time: 08/04/22  6:42 AM   Result Value Ref Range    POC Glucose 341 (H) 65 - 105 mg/dL         Imaging/Diagonstics:  EKG: sinus tachycardia. XR ABDOMEN (KUB) (SINGLE AP VIEW)    Result Date: 8/3/2022  EXAMINATION: ONE SUPINE XRAY VIEW(S) OF THE ABDOMEN 8/3/2022 5:53 pm COMPARISON: 01/27/2022 HISTORY: ORDERING SYSTEM PROVIDED HISTORY: FOR STAT MRI TECHNOLOGIST PROVIDED HISTORY: FOR STAT MRI FINDINGS: Residual contrast in the urinary tract and mildly distended urinary bladder from recent CT. Mild fecal stasis in the colon with a nonspecific nonobstructive bowel gas pattern as visualized. Faintly visualized pelvic phleboliths. No acute osseous abnormality is seen. No metallic foreign bodies are seen on the images obtained. No acute findings. Contrast in the urinary tract from recent CT. CT HEAD WO CONTRAST    Result Date: 8/3/2022  EXAMINATION: CT OF THE HEAD WITHOUT CONTRAST  8/3/2022 10:46 am TECHNIQUE: CT of the head was performed without the administration of intravenous contrast. Automated exposure control, iterative reconstruction, and/or weight based adjustment of the mA/kV was utilized to reduce the radiation dose to as low as reasonably achievable. COMPARISON: Head CT 11/04/2021 HISTORY: ORDERING SYSTEM PROVIDED HISTORY: ams TECHNOLOGIST PROVIDED HISTORY: ams Decision Support Exception - unselect if not a suspected or confirmed emergency medical condition->Emergency Medical Condition (MA) Is the patient pregnant?->No Reason for Exam: Altered Mental Status; Hyperglycemia FINDINGS: BRAIN/VENTRICLES: Assessment is motion degraded. Ill-defined regions of low attenuation in the anterior left MCA distribution involving the left frontal lobe extending into the subinsular region. The adjacent sulci are suboptimally delineated, though this may be exacerbated by motion artifact. Patchy regions of low attenuation in the periventricular and subcortical white matter which are mild to moderate and otherwise similar to prior. Mild parenchymal atrophy. No acute intracranial hemorrhage or midline shift. No abnormal extra-axial fluid collection. There is no evidence of hydrocephalus. ORBITS: The visualized portion of the orbits demonstrate no acute abnormality. SINUSES: The visualized paranasal sinuses and mastoid air cells demonstrate no acute abnormality. SOFT TISSUES/SKULL:  No gross acute abnormality of the visualized skull or soft tissues. Ill-defined low attenuation in the anterior left MCA distribution which is new from prior comparison imaging and may be due to evolving infarct in the appropriate clinical setting. Assessment is overall motion degraded with possible associated mild sulcal effacement but no midline shift. No acute intracranial hemorrhage.      XR CHEST PORTABLE    Result Date: 8/3/2022  EXAMINATION: ONE XRAY VIEW OF THE CHEST 8/3/2022 12:27 pm COMPARISON: November 4, 2021 HISTORY: ORDERING SYSTEM PROVIDED HISTORY: [facility] TECHNOLOGIST PROVIDED HISTORY: ams Reason for Exam: Altered mental status/  AP erect/ port FINDINGS: Allowing for differences in technique, stable cardiomediastinal silhouette. No significant pulmonary edema. No convincing lung consolidation or infiltrate. No pleural effusion or pneumothorax. Osseous structures grossly intact. No acute cardiopulmonary process     CT BRAIN PERFUSION    Result Date: 8/3/2022  EXAMINATION: CTA OF THE HEAD AND NECK WITH CONTRAST; CTA OF THE HEAD WITH CONTRAST WITH PERFUSION 8/3/2022 5:19 pm: TECHNIQUE: CTA of the head and neck was performed with the administration of intravenous contrast. Multiplanar reformatted images are provided for review. MIP images are provided for review. Stenosis of the internal carotid arteries measured using NASCET criteria. Automated exposure control, iterative reconstruction, and/or weight based adjustment of the mA/kV was utilized to reduce the radiation dose to as low as reasonably achievable.; CTA of the head/brain was performed with the administration of intravenous contrast. Multiplanar reformatted images are provided for review. MIP images are provided for review. Automated exposure control, iterative reconstruction, and/or weight based adjustment of the mA/kV was utilized to reduce the radiation dose to as low as reasonably achievable.  COMPARISON: Noncontrast CT head from earlier today HISTORY: ORDERING SYSTEM PROVIDED HISTORY: [facility] acute right sided hemiparesis TECHNOLOGIST PROVIDED HISTORY: [facility] acute right sided hemiparesis Decision Support Exception - unselect if not a suspected or confirmed emergency medical condition->Emergency Medical Condition (MA) Reason for Exam: ams; best images per pt condition ; ORDERING SYSTEM PROVIDED HISTORY: AMS acute onset right sided hemiparesis and aphasia TECHNOLOGIST PROVIDED HISTORY: AMS acute onset right sided hemiparesis and aphasia Decision Support Exception - unselect if not a suspected or confirmed emergency medical condition->Emergency Medical Condition (MA) Reason for Exam: ams; best images per pt condition FINDINGS: CTA NECK: AORTIC ARCH/ARCH VESSELS: No dissection or arterial injury. No significant stenosis of the brachiocephalic or subclavian arteries. CAROTID ARTERIES: No dissection, arterial injury, or hemodynamically significant stenosis by NASCET criteria. VERTEBRAL ARTERIES: No dissection, arterial injury, or significant stenosis. SOFT TISSUES: There is ground-glass attenuation at the lung apices, likely related to underdistention versus pneumonitis. No cervical or superior mediastinal lymphadenopathy. The larynx and pharynx are unremarkable. No acute abnormality of the salivary and thyroid glands. BONES: No acute osseous abnormality. CTA HEAD: ANTERIOR CIRCULATION: There is up to 50% stenosis in the cavernous/supraclinoid segments of the bilateral internal carotid arteries, right worse than left. No significant stenosis of the anterior cerebral, or middle cerebral arteries. No aneurysm. POSTERIOR CIRCULATION: No significant stenosis of the vertebral, basilar, or posterior cerebral arteries. No aneurysm. OTHER: No dural venous sinus thrombosis on this non-dedicated study. BRAIN: There is decreased gray-white differentiation in the left frontal lobe, likely related to acute to subacute infarction, with associated local mass effect. There is enhancement in the area of infarction. No midline shift. No extra-axial fluid collection. CT PERFUSION: EXAM QUALITY: The examination is diagnostic with appropriate arterial inflow and venous outflow curves, and diagnostic perfusion maps. There is abnormal perfusion in the left frontal lobe, likely related to acute to subacute infarction.  CORE INFARCT: The total area of ischemic core is 0 mL (CBF<30% volume). TOTAL HYPOPERFUSION: The total area of hypoperfusion is 21 mL (Tmax>6s volume). PENUMBRA: The penumbra (mismatch) volume is 21 mL and is located in the left occipital lobe, likely related to artifact. The mismatch ratio is n/a.     1. Decreased gray-white differentiation in the left frontal lobe, likely related to acute to subacute infarction, with associated local mass effect. 2. Abnormal perfusion in the left frontal lobe, likely related to acute to subacute infarction. 3. Perfusion mismatch in left occipital lobe, likely related to artifact. 4. Up to 50% stenosis in the cavernous/supraclinoid segments of the bilateral internal carotid arteries, right worse than left. Otherwise, no acute abnormality or flow-limiting stenosis of the major arteries of the head. 5. No acute abnormality or flow-limiting stenosis of the major arteries of the neck. CTA HEAD NECK W CONTRAST    Result Date: 8/3/2022  EXAMINATION: CTA OF THE HEAD AND NECK WITH CONTRAST; CTA OF THE HEAD WITH CONTRAST WITH PERFUSION 8/3/2022 5:19 pm: TECHNIQUE: CTA of the head and neck was performed with the administration of intravenous contrast. Multiplanar reformatted images are provided for review. MIP images are provided for review. Stenosis of the internal carotid arteries measured using NASCET criteria. Automated exposure control, iterative reconstruction, and/or weight based adjustment of the mA/kV was utilized to reduce the radiation dose to as low as reasonably achievable.; CTA of the head/brain was performed with the administration of intravenous contrast. Multiplanar reformatted images are provided for review. MIP images are provided for review. Automated exposure control, iterative reconstruction, and/or weight based adjustment of the mA/kV was utilized to reduce the radiation dose to as low as reasonably achievable.  COMPARISON: Noncontrast CT head from earlier today HISTORY: ORDERING SYSTEM PROVIDED HISTORY: AMS acute right sided hemiparesis TECHNOLOGIST PROVIDED HISTORY: AMS acute right sided hemiparesis Decision Support Exception - unselect if not a suspected or confirmed emergency medical condition->Emergency Medical Condition (MA) Reason for Exam: ams; best images per pt condition ; ORDERING SYSTEM PROVIDED HISTORY: AMS acute onset right sided hemiparesis and aphasia TECHNOLOGIST PROVIDED HISTORY: AMS acute onset right sided hemiparesis and aphasia Decision Support Exception - unselect if not a suspected or confirmed emergency medical condition->Emergency Medical Condition (MA) Reason for Exam: ams; best images per pt condition FINDINGS: CTA NECK: AORTIC ARCH/ARCH VESSELS: No dissection or arterial injury. No significant stenosis of the brachiocephalic or subclavian arteries. CAROTID ARTERIES: No dissection, arterial injury, or hemodynamically significant stenosis by NASCET criteria. VERTEBRAL ARTERIES: No dissection, arterial injury, or significant stenosis. SOFT TISSUES: There is ground-glass attenuation at the lung apices, likely related to underdistention versus pneumonitis. No cervical or superior mediastinal lymphadenopathy. The larynx and pharynx are unremarkable. No acute abnormality of the salivary and thyroid glands. BONES: No acute osseous abnormality. CTA HEAD: ANTERIOR CIRCULATION: There is up to 50% stenosis in the cavernous/supraclinoid segments of the bilateral internal carotid arteries, right worse than left. No significant stenosis of the anterior cerebral, or middle cerebral arteries. No aneurysm. POSTERIOR CIRCULATION: No significant stenosis of the vertebral, basilar, or posterior cerebral arteries. No aneurysm. OTHER: No dural venous sinus thrombosis on this non-dedicated study. BRAIN: There is decreased gray-white differentiation in the left frontal lobe, likely related to acute to subacute infarction, with associated local mass effect. There is enhancement in the area of infarction. No midline shift. No extra-axial fluid collection. CT PERFUSION: EXAM QUALITY: The examination is diagnostic with appropriate arterial inflow and venous outflow curves, and diagnostic perfusion maps. There is abnormal perfusion in the left frontal lobe, likely related to acute to subacute infarction. CORE INFARCT: The total area of ischemic core is 0 mL (CBF<30% volume). TOTAL HYPOPERFUSION: The total area of hypoperfusion is 21 mL (Tmax>6s volume). PENUMBRA: The penumbra (mismatch) volume is 21 mL and is located in the left occipital lobe, likely related to artifact. The mismatch ratio is n/a.     1. Decreased gray-white differentiation in the left frontal lobe, likely related to acute to subacute infarction, with associated local mass effect. 2. Abnormal perfusion in the left frontal lobe, likely related to acute to subacute infarction. 3. Perfusion mismatch in left occipital lobe, likely related to artifact. 4. Up to 50% stenosis in the cavernous/supraclinoid segments of the bilateral internal carotid arteries, right worse than left. Otherwise, no acute abnormality or flow-limiting stenosis of the major arteries of the head. 5. No acute abnormality or flow-limiting stenosis of the major arteries of the neck. MRI LIMITED BRAIN    Result Date: 8/3/2022  EXAMINATION: MRI OF THE BRAIN WITHOUT CONTRAST  8/3/2022 6:44 pm TECHNIQUE: Multiplanar multisequence MRI of the brain was performed without the administration of intravenous contrast. COMPARISON: CT angiogram head neck and CT perfusion 08/03/2022 HISTORY: ORDERING SYSTEM PROVIDED HISTORY: LEFT MCA distribution stroke TECHNOLOGIST PROVIDED HISTORY: LEFT MCA distribution stroke Reason for Exam: Left MCA - Distribution stroke. FINDINGS: INTRACRANIAL STRUCTURES/VENTRICLES: Left frontal/left MCA distribution infarct with corresponding diffusion hyperintensity and T2 FLAIR hyperintensity. Petechial blood products noted. .  Mild focal mass effect.  Otherwise mild generalized involutional change. Moderate chronic microvascular disease. No shift of midline structure. Basal cisterns are patent. No mass effect or midline shift. No evidence of an acute intracranial hemorrhage. The ventricles and sulci are normal in size and configuration. The sellar/suprasellar regions appear unremarkable. The normal signal voids within the major intracranial vessels appear maintained. ORBITS: The visualized portion of the orbits demonstrate no acute abnormality. SINUSES: Mild sinus disease. BONES/SOFT TISSUES: The bone marrow signal intensity appears normal. The soft tissues demonstrate no acute abnormality. Acute subacute left MCA/frontal infarct. Mild petechial blood products. Otherwise moderate chronic microvascular disease.        Current Facility-Administered Medications   Medication Dose Route Frequency Provider Last Rate Last Admin    0.9 % sodium chloride infusion   IntraVENous Continuous Charlene Gonzales MD        insulin lispro (HUMALOG) injection vial 0-4 Units  0-4 Units SubCUTAneous TID WC Charlene Gonzales MD   3 Units at 08/04/22 0850    insulin lispro (HUMALOG) injection vial 0-4 Units  0-4 Units SubCUTAneous Nightly Charlene Gonzales MD        aspirin chewable tablet 81 mg  81 mg Oral Daily Charlene Gonzales MD        glucose chewable tablet 16 g  4 tablet Oral PRN Charlene Gonzales MD        dextrose bolus 10% 125 mL  125 mL IntraVENous PRN Charlene Gonzales MD        Or    dextrose bolus 10% 250 mL  250 mL IntraVENous PRN Charlene Gonzales MD        glucagon (rDNA) injection 1 mg  1 mg IntraMUSCular PRN Charlene Gonzales MD        dextrose 5 % solution  100 mL/hr IntraVENous PRN Charlene Gonzales MD        potassium bicarb-citric acid (EFFER-K) effervescent tablet 20 mEq  20 mEq Oral Once Charlene Gonzales MD        albuterol sulfate HFA (PROVENTIL;VENTOLIN;PROAIR) 108 (90 Base) MCG/ACT inhaler 2 puff  2 puff Inhalation Q4H PRN Charlene Gonzales MD        atorvastatin (LIPITOR) tablet 40 mg  40 mg Oral Nightlouie Kennedy MD   40 mg at 08/04/22 0033    budesonide-formoterol (SYMBICORT) 80-4.5 MCG/ACT inhaler 2 puff  2 puff Inhalation BID Nissa Kennedy MD        dicyclomine (BENTYL) capsule 10 mg  10 mg Oral 4x Daily Nissa Kennedy MD   10 mg at 08/04/22 0034    insulin glargine (LANTUS) injection vial 30 Units  30 Units SubCUTAneous BID Nissa Kennedy MD   30 Units at 08/04/22 0850    mirtazapine (REMERON) tablet 7.5 mg  7.5 mg Oral Nightly Nissa Kennedy MD   7.5 mg at 08/04/22 0033    pantoprazole (PROTONIX) tablet 20 mg  20 mg Oral BID AC Nissa Kennedy MD   20 mg at 08/04/22 0522    traZODone (DESYREL) tablet 150 mg  150 mg Oral Nightly Nissa Kennedy MD   150 mg at 08/04/22 0033    venlafaxine (EFFEXOR XR) extended release capsule 150 mg  150 mg Oral Daily with breakfast Nissa Kennedy MD        sodium chloride flush 0.9 % injection 5-40 mL  5-40 mL IntraVENous 2 times per day Nissa Kennedy MD        sodium chloride flush 0.9 % injection 5-40 mL  5-40 mL IntraVENous PRN Nissa Kennedy MD        0.9 % sodium chloride infusion   IntraVENous JORJE Kennedy MD        ondansetron (ZOFRAN-ODT) disintegrating tablet 4 mg  4 mg Oral Q8H PRJAKE Kennedy MD        Or    ondansetron (ZOFRAN) injection 4 mg  4 mg IntraVENous Q6H PRN Nissa Kennedy MD        polyethylene glycol (GLYCOLAX) packet 17 g  17 g Oral Daily PRN Nissa Kennedy MD        acetaminophen (TYLENOL) tablet 650 mg  650 mg Oral Q6H PRN Nissa Kennedy MD        Or    acetaminophen (TYLENOL) suppository 650 mg  650 mg Rectal Q6H PRN Nissa Kennedy MD        potassium chloride (KLOR-CON M) extended release tablet 40 mEq  40 mEq Oral PRN Nissa Kennedy MD        Or    potassium bicarb-citric acid (EFFER-K) effervescent tablet 40 mEq  40 mEq Oral PRN Nissa Kennedy MD        Or    potassium chloride 10 mEq/100 mL IVPB (Peripheral Line)  10 mEq IntraVENous PRN Nissa Kennedy MD        magnesium sulfate 2000 mg in 50 mL IVPB premix  2,000 mg IntraVENous PRN Nhi Vo MD        heparin (porcine) injection 5,000 Units  5,000 Units SubCUTAneous 3 times per day Nhi Vo MD   5,000 Units at 08/04/22 0034       ASSESSMENT:     Principal Problem:    Acute encephalopathy  Active Problems:    Hyperglycemia due to diabetes mellitus ([facility] [address]    Bipolar 1 disorder, depressed, severe ([facility] [address]  Resolved Problems:    * No resolved hospital problems.  *      PLAN:     Acute encephalopathy likely secondary to stroke versus brain tumor  - CT head showed left frontal infarct  - CTA head and neck showed possible acute to subacute infarct left frontal lobe  - MRI brain without contrast showed acute subacute left MCA/frontal infarct, possible tumor  - Neurology, Neurosurgery and neuroendovascular neurosurgery consulted- recommendations pending   - Hypercoagulable work-up was ordered by neurology  - MRI brain with contrast pending  - Aspirin 81 mg daily, Lipitor 40 mg daily  -Lipid results normal, TSH normal and hemoglobin A1c 11.4 (down from 14.0 in 04/22)        Hyperglycemia secondary to uncontrolled type II diabetes  - Glucose 406 on admission, trended down to 266 on 30 units lantus and humalog  - Beta hydroxybutyrate mildly elevated at 1.42  - A1c 11.4  - Deferred insulin drip and DKA protocol in the setting of improved glucose  - Continue IV fluid 100mL/hr  - Continue home dose of Lantus 30 units twice daily, and insulin sliding scale  - BMP every 4 hours and monitor for recurrence of DKA  -Hypoglycemia protocol     Bipolar disorder  - Unclear if patient is compliant with her medications  - continue remeron 7.5 mg PO nightly  - effexor  mg po daily  - trazadone 150 mg PO nightly  -Psych consulted- recommendations pending     DVT prophylaxis: heparin (porcine) injection 5,000 TID  GI prophylaxis: Famotidine 20 mg BID      Consultations:  Consults: IP CONSULT TO IV TEAM  IP CONSULT TO NEUROLOGY  IP CONSULT TO ENDOVASCULAR NEUROSURGERY  IP CONSULT TO FAMILY MEDICINE  PT/OT       Above plan discussed with the patient and family in room, who agreed to the above plan     Plan will be discussed with the attending, Dr. Muna Sheriff MD  Family Medicine Resident  8/4/2022 9:20 AM

## 2022-08-04 NOTE — FLOWSHEET NOTE
Patient refuses to go to MRI. Answers 1 or 2 questions with eyes closed and then ignores writer.  Md notified

## 2022-08-04 NOTE — FLOWSHEET NOTE
Patient awake, speaking in full sentences. Tearful stating \"I have to go, I'm not sure what to do. \" Reoriented, patient then asks to call a number for her.  Which is currently \"out of service\" family medicine paged to bedside

## 2022-08-04 NOTE — PLAN OF CARE
Problem: Discharge Planning  Goal: Discharge to home or other facility with appropriate resources  Outcome: Not Progressing  Flowsheets (Taken 8/3/2022 4436 by Nida Hernandez RN)  Discharge to home or other facility with appropriate resources:   Identify barriers to discharge with patient and caregiver   Arrange for needed discharge resources and transportation as appropriate   Identify discharge learning needs (meds, wound care, etc)   Arrange for interpreters to assist at discharge as needed   Refer to discharge planning if patient needs post-hospital services based on physician order or complex needs related to functional status, cognitive ability or social support system     Problem: Safety - Adult  Goal: Free from fall injury  Outcome: Not Progressing  Flowsheets (Taken 8/4/2022 1100)  Free From Fall Injury:   Instruct family/caregiver on patient safety   Based on caregiver fall risk screen, instruct family/caregiver to ask for assistance with transferring infant if caregiver noted to have fall risk factors     Problem: Skin/Tissue Integrity  Goal: Absence of new skin breakdown  Description: 1. Monitor for areas of redness and/or skin breakdown  2. Assess vascular access sites hourly  3. Every 4-6 hours minimum:  Change oxygen saturation probe site  4. Every 4-6 hours:  If on nasal continuous positive airway pressure, respiratory therapy assess nares and determine need for appliance change or resting period.   Outcome: Not Progressing     Problem: Discharge Planning  Goal: Discharge to home or other facility with appropriate resources  Outcome: Not Progressing  Flowsheets (Taken 8/3/2022 2356 by Nida Hernandez RN)  Discharge to home or other facility with appropriate resources:   Identify barriers to discharge with patient and caregiver   Arrange for needed discharge resources and transportation as appropriate   Identify discharge learning needs (meds, wound care, etc)   Arrange for interpreters to assist at discharge as needed   Refer to discharge planning if patient needs post-hospital services based on physician order or complex needs related to functional status, cognitive ability or social support system     Problem: Safety - Adult  Goal: Free from fall injury  Outcome: Not Progressing  Flowsheets (Taken 8/4/2022 1100)  Free From Fall Injury:   Instruct family/caregiver on patient safety   Based on caregiver fall risk screen, instruct family/caregiver to ask for assistance with transferring infant if caregiver noted to have fall risk factors     Problem: Skin/Tissue Integrity  Goal: Absence of new skin breakdown  Description: 1. Monitor for areas of redness and/or skin breakdown  2. Assess vascular access sites hourly  3. Every 4-6 hours minimum:  Change oxygen saturation probe site  4. Every 4-6 hours:  If on nasal continuous positive airway pressure, respiratory therapy assess nares and determine need for appliance change or resting period.   Outcome: Not Progressing     Problem: Chronic Conditions and Co-morbidities  Goal: Patient's chronic conditions and co-morbidity symptoms are monitored and maintained or improved  Outcome: Not Progressing

## 2022-08-04 NOTE — FLOWSHEET NOTE
Patient unable or \"unwilling\" to participate to complete MRI checklist form or telepsych visit paperwork.

## 2022-08-04 NOTE — PROGRESS NOTES
of the bilateral   internal carotid arteries, right worse than left.  Endovascular were consulted Commented on perfusion scan  \"odd looking penumbra in the left frontal/temporal lobe with abnormal MTT, no core infarct seen\"   They also got MRI brain wo \"questionable odd looking acute ischemic stroke vs tumor in the left frontal temporal with hemorrhagic transformation on SWI\" and they recommended and MRI with contrast         Past Medical History:     Past Medical History:   Diagnosis Date    Acid reflux 5/29/2017    Acute cystitis with hematuria 10/11/2016    Acute non-recurrent maxillary sinusitis 11/28/2017    Asthma     Bipolar 1 disorder ([address] 1/4/2018    Bipolar disorder, mixed ([address]     BMI 34.0-34.9,adult 11/28/2017    Cannabis use disorder, severe, dependence ([address] 12/19/2017    Cerebrovascular accident (CVA) ([address] 6/14/2017    Chest pain 11/5/2016    Chronic renal insufficiency     Cocaine abuse ([address] 1/5/2018    COVID-19 virus RNA test result unknown     DDD (degenerative disc disease), cervical     Diabetes mellitus ([address]     Dizziness 6/13/2017    Fibromyalgia     History of stroke 9/9/2017    Homicidal ideation 11/6/2017    Hyperglycemia     Hypertension     Hypotension 1/18/2019    IDDM (insulin dependent diabetes mellitus) 12/21/2015    Lupus ([address]     Migraine     Neuropathy     Neuropathy     Polysubstance abuse ([address] 9/17/2017    Post traumatic stress disorder (PTSD)     Posttraumatic stress disorder 5/29/2017    Recurrent depression ([address] 5/29/2017    Recurrent major depressive disorder, in partial remission ([address] 11/28/2017    Screening mammogram, encounter for 11/28/2017    Severe recurrent major depression with psychotic features ([address] 12/19/2017    Severe recurrent major depression without psychotic features ([address] 12/19/2017    Stroke (cerebrum) ([address] 6/14/2017    Stroke ([address]     per chart notes    Suicidal ideation     Suicidal intent 3/10/2017    Vitamin D deficiency 11/28/2017    White matter changes 6/13/2017        Past Surgical History:     Past Surgical History:   Procedure Laterality Date    ABCESS DRAINAGE      right buttock    ABDOMINAL SURGERY      drain tube    CATARACT EXTRACTION W/  INTRAOCULAR LENS IMPLANT Bilateral     CHEST TUBE INSERTION      FINGER AMPUTATION Left 03/13/2021    LEFT RING FINER AMPUTATION performed by Army Natividad MD at [address] Right 10/11/2021    AMPUTATION RIGHT INDEX FINGER performed by Army Natividad MD at [address] Right 1/27/2022    FOOT ABSCESS INCISION AND DRAINAGE  (PULSE LAVAGE, CULTURE SWABS) performed by Niesha Easley DPM at [facility] Right 01/27/2022    FOOT ABSCESS INCISION AND DRAINAGE (PULSE LAVAGE, CULTURE SWABS) - Right    HAND SURGERY Right 09/14/2021    I&D INDEX FINGER performed by Army Natividad MD at [address] Right 09/15/2021    I&D INDEX FINGER performed by Army Natividad MD at [address]  2/3/2022         LASIK Bilateral         Medications Prior to Admission:     Prior to Admission medications    Medication Sig Start Date End Date Taking? Authorizing Provider   pregabalin (LYRICA) 200 MG capsule Take 1 capsule by mouth daily for 30 days.  4/22/22 5/22/22  Johanna Marcus MD   insulin glargine (LANTUS SOLOSTAR) 100 UNIT/ML injection pen Inject 30 Units into the skin 2 times daily 4/22/22   Johanna Marcus MD   atorvastatin (LIPITOR) 40 MG tablet Take 1 tablet by mouth nightly 4/22/22 7/21/22  Johanna Marcus MD   acetaminophen (TYLENOL) 500 MG tablet Take 2 tablets by mouth 3 times daily as needed for Pain 3/15/22   Johanna Marcus MD   fluticasone (FLONASE) 50 MCG/ACT nasal spray 1 spray by Each Nostril route daily 3/15/22   Johanna Marcus MD   metFORMIN (GLUCOPHAGE) 1000 MG tablet Take 1 tablet by mouth 2 times daily (with meals) 3/15/22   Johanna Marcus MD   mirtazapine (REMERON) 7.5 MG tablet Take 1 tablet by mouth nightly 3/15/22   Loy Hanley MD   pantoprazole (PROTONIX) 20 MG tablet Take 1 tablet by mouth 2 times daily (before meals) 3/15/22   Lyo Hanley MD   traZODone (DESYREL) 150 MG tablet Take 1 tablet by mouth nightly 3/15/22   Loy Hanley MD   venlafaxine (EFFEXOR XR) 150 MG extended release capsule Take 1 capsule by mouth daily (with breakfast) 3/15/22   Loy Hanley MD   Alcohol Swabs 70 % PADS Use 1 swab 3 times daily as needed 3/15/22   Loy Hanley MD   blood glucose monitor strips Test 3 times a day & as needed for symptoms of irregular blood glucose. Dispense sufficient amount for indicated testing frequency plus additional to accommodate PRN testing needs. 3/15/22   Loy Hanley MD   Lancets MISC 1 each by Does not apply route 3 times daily 3/15/22   Loy Hanley MD   Insulin Pen Needle ([employer] PEN NEEDLES 31G) 31G X 8 MM MISC 1 each by Does not apply route daily 3/15/22   Loy Hanley MD   FREESTYLE LITE strip 1 each by Does not apply route 3 times daily 3/15/22   Loy Hanley MD   budesonide-formoterol (SYMBICORT) 80-4.5 MCG/ACT AERO Inhale 2 puffs into the lungs 2 times daily 3/15/22   Loy Hanley MD   albuterol sulfate  (90 Base) MCG/ACT inhaler Inhale 2 puffs into the lungs every 4 hours as needed for Wheezing 3/15/22 4/15/22  Loy Hanley MD   mupirocin (BACTROBAN) 2 % ointment Apply topically 3 times daily Apply topically to right foot wound two times a day for wound care. Apply to right foot wound, cover with Kerlix and ace wrap. Historical Provider, MD   Misc.  Devices MISC 1 PAIR OF DIABETIC SHOES (1 LEFT/ 1 RIGHT)  1-3 PAIRS OF INSERTS (LEFT/ RIGHT) 2/15/22   Jacob Mendoza DPM   dicyclomine (BENTYL) 10 MG capsule Take 1 capsule by mouth 4 times daily 12/17/21   Loy Hanley MD   ibuprofen (ADVIL;MOTRIN) 800 MG tablet Take 1 tablet by mouth every 8 hours as needed for Pain 11/15/21 11/29/21  Ana Verma MD FLOVENT  MCG/ACT inhaler Inhale 2 puffs into the lungs 2 times daily 10/20/20   Thang Lamb MD        Allergies:     Bactrim [sulfamethoxazole-trimethoprim] and Adhesive tape    Social History:     Tobacco:    reports that she has never smoked. She has never used smokeless tobacco.  Alcohol:      reports that she does not currently use alcohol. Drug Use:  reports current drug use. Drugs: Marijuana (Weed) and Cocaine.     Family History:     Family History   Problem Relation Age of Onset    Diabetes Mother     Stroke Mother     Diabetes Father     Diabetes Sister     Diabetes Brother     Other Son         GSW    No Known Problems Sister        Review of Systems:     Review of Systems    Physical Exam:   BP (!) 90/59   Pulse 83   Temp 97.9 °F (36.6 °C) (Temporal)   Resp 13   Wt 240 lb (108.9 kg)   LMP  (LMP Unknown)   SpO2 94%   BMI 39.94 kg/m²   Temp (24hrs), Av.4 °F (36.9 °C), Min:97.4 °F (36.3 °C), Max:99.5 °F (37.5 °C)    Recent Labs     22  1747 22  1954 22  2301 22  0642   POCGLU 515* 172* 169* 341*       Intake/Output Summary (Last 24 hours) at 2022 0931  Last data filed at 2022 0640  Gross per 24 hour   Intake 500 ml   Output --   Net 500 ml         Neurologic Exam     GENERAL  Appears comfortable and in no distress   HEENT  NC/ AT   HEART  S1 and S2 heard; palpation of pulses: radial pulse    NECK  Supple and no bruits heard   MENTAL STATUS:  Alert, oriented, intact memory, no confusion, normal speech, normal language, no hallucination or delusion   CRANIAL NERVES: II     -      Visual fields intact to confrontation  III,IV,VI -  PERR, EOMs full, no ptosis  V     -     Normal facial sensation   VII    -     Normal facial symmetry  VIII   -     Intact hearing   IX,X -     Symmetrical palate  XI    -     Symmetrical shoulder shrug  XII   -     Midline tongue, no atrophy    MOTOR FUNCTION: RUE: Significant for good strength of grade 5/5 in proximal and distal muscle groups   LUE: Significant for good strength of grade 5/5 in proximal and distal muscle groups   RLE: Significant for good strength of grade 5/5 in proximal and distal muscle groups   LLE: Significant for good strength of grade 5/5 in proximal and distal muscle groups      Normal bulk, normal tone and no involuntary movements, no tremor   SENSORY FUNCTION:  Normal touch, normal pinprick, normal vibration, normal proprioception   CEREBELLAR FUNCTION:  Intact fine motor control over upper limbs and lower limbs   REFLEX FUNCTION:  Symmetric in upper and lower extremities, no Babinski sign   STATION and GAIT  Normal gait and tandem station, normal tip toes and heel walking       Investigations:      Laboratory Testing:  Recent Results (from the past 24 hour(s))   Mixed Venous Gas, POC    Collection Time: 08/03/22 12:08 PM   Result Value Ref Range    PH MIXED 7.378 7.310 - 7.410    PCO2, Mixed 39.9 (L) 42.0 - 52.0 mm Hg    PO2, Mixed 54.1 (H) 35.0 - 45.0 mm Hg    HCO3, Mixed 23.5 23.0 - 29.0 mmol/L    Negative Base Excess, Mixed 2 0.0 - 2.0    O2 Sat, Mixed 87 (H) 60.0 - 80.0 %   ELECTROLYTES PLUS    Collection Time: 08/03/22 12:08 PM   Result Value Ref Range    POC Sodium 143 138 - 146 mmol/L    POC Potassium 3.6 3.5 - 4.5 mmol/L    POC Chloride 108 (H) 98 - 107 mmol/L    POC TCO2 24 22 - 30 mmol/L    Anion Gap 12 7 - 16 mmol/L   Hemoglobin and hematocrit, blood    Collection Time: 08/03/22 12:08 PM   Result Value Ref Range    POC Hemoglobin 11.6 (L) 12.0 - 16.0 g/dL    POC Hematocrit 34 (L) 36 - 46 %   Creatinine W/GFR Point of Care    Collection Time: 08/03/22 12:08 PM   Result Value Ref Range    POC Creatinine 0.63 0.51 - 1.19 mg/dL    GFR Comment >60 >60 mL/min    GFR Non-African American >60 >60 mL/min    GFR Comment         CALCIUM, IONIC (POC)    Collection Time: 08/03/22 12:08 PM   Result Value Ref Range    POC Ionized Calcium 1.19 1.15 - 1.33 mmol/L   POCT urea (BUN)    Collection Time: 08/03/22 12:08 PM   Result Value Ref Range    POC BUN 9 8 - 26 mg/dL   Lactic Acid, POC    Collection Time: 08/03/22 12:08 PM   Result Value Ref Range    POC Lactic Acid 0.98 0.56 - 1.39 mmol/L   POCT Glucose    Collection Time: 08/03/22 12:08 PM   Result Value Ref Range    POC Glucose 406 (HH) 74 - 100 mg/dL   CBC with Auto Differential    Collection Time: 08/03/22 12:14 PM   Result Value Ref Range    WBC 7.6 3.5 - 11.3 k/uL    RBC 3.47 (L) 3.95 - 5.11 m/uL    Hemoglobin 10.7 (L) 11.9 - 15.1 g/dL    Hematocrit 32.6 (L) 36.3 - 47.1 %    MCV 93.9 82.6 - 102.9 fL    MCH 30.8 25.2 - 33.5 pg    MCHC 32.8 28.4 - 34.8 g/dL    RDW 12.0 11.8 - 14.4 %    Platelets 988 611 - 177 k/uL    MPV 11.2 8.1 - 13.5 fL    NRBC Automated 0.0 0.0 per 100 WBC    Seg Neutrophils 59 36 - 65 %    Lymphocytes 28 24 - 43 %    Monocytes 10 3 - 12 %    Eosinophils % 2 1 - 4 %    Basophils 1 0 - 2 %    Immature Granulocytes 0 0 %    Segs Absolute 4.47 1.50 - 8.10 k/uL    Absolute Lymph # 2.13 1.10 - 3.70 k/uL    Absolute Mono # 0.72 0.10 - 1.20 k/uL    Absolute Eos # 0.16 0.00 - 0.44 k/uL    Basophils Absolute 0.06 0.00 - 0.20 k/uL    Absolute Immature Granulocyte <0.03 0.00 - 0.30 k/uL   Comprehensive Metabolic Panel    Collection Time: 08/03/22 12:14 PM   Result Value Ref Range    Glucose 421 (HH) 70 - 99 mg/dL    BUN 8 6 - 20 mg/dL    Creatinine 0.76 0.50 - 0.90 mg/dL    Calcium 9.2 8.6 - 10.4 mg/dL    Sodium 138 135 - 144 mmol/L    Potassium 3.5 (L) 3.7 - 5.3 mmol/L    Chloride 103 98 - 107 mmol/L    CO2 22 20 - 31 mmol/L    Anion Gap 13 9 - 17 mmol/L    Alkaline Phosphatase 103 35 - 104 U/L    ALT 10 5 - 33 U/L    AST 15 <32 U/L    Total Bilirubin 0.33 0.3 - 1.2 mg/dL    Total Protein 6.9 6.4 - 8.3 g/dL    Albumin 3.8 3.5 - 5.2 g/dL    Albumin/Globulin Ratio 1.2 1.0 - 2.5    GFR Non-African American >60 >60 mL/min    GFR African American >60 >60 mL/min    GFR Comment         Beta-Hydroxybutyrate    Collection Time: 08/03/22 12:14 PM   Result Value Ref Range    Beta-Hydroxybutyrate 1.48 (H) 0.02 - 0.27 mmol/L   Ammonia    Collection Time: 08/03/22 12:14 PM   Result Value Ref Range    Ammonia 28 11 - 51 umol/L   TOX SCR, BLD, ED    Collection Time: 08/03/22 12:14 PM   Result Value Ref Range    Acetaminophen Level <5 (L) 10 - 30 ug/mL    Ethanol <10 <10 mg/dL    Ethanol percent <0.761 <4.473 %    Salicylate Lvl 1 (L) 3 - 10 mg/dL    Toxic Tricyclic Sc,Blood NEGATIVE NEGATIVE   Troponin    Collection Time: 08/03/22 12:14 PM   Result Value Ref Range    Troponin, High Sensitivity 28 (H) 0 - 14 ng/L   Lactic Acid    Collection Time: 08/03/22 12:15 PM   Result Value Ref Range    Lactic Acid, Whole Blood 1.2 0.7 - 2.1 mmol/L   Culture, Blood 1    Collection Time: 08/03/22 12:18 PM    Specimen: Blood   Result Value Ref Range    Specimen Description . BLOOD     Culture NO GROWTH 12 HOURS    EKG 12 Lead    Collection Time: 08/03/22 12:21 PM   Result Value Ref Range    Ventricular Rate 102 BPM    Atrial Rate 102 BPM    P-R Interval 136 ms    QRS Duration 84 ms    Q-T Interval 396 ms    QTc Calculation (Bazett) 516 ms    P Axis 62 degrees    R Axis -9 degrees    T Axis -2 degrees   BLOOD GAS, VENOUS    Collection Time: 08/03/22  3:52 PM   Result Value Ref Range    pH, Alex 7.280 (L) 7.320 - 7.420    pCO2, Alex 50.9 39 - 55    pO2, Alex 40.4 30 - 50    HCO3, Venous 23.2 (L) 24 - 30 mmol/L    Negative Base Excess, Alex 3.3 (H) 0.0 - 2.0 mmol/L    O2 Sat, Alex 65.6 60.0 - 85.0 %    Carboxyhemoglobin 2.2 0 - 5 %    Pt Temp 37.0     FIO2 INFORMATION NOT PROVIDED    POC Glucose Fingerstick    Collection Time: 08/03/22  5:47 PM   Result Value Ref Range    POC Glucose 515 (HH) 65 - 105 mg/dL   POC Glucose Fingerstick    Collection Time: 08/03/22  7:54 PM   Result Value Ref Range    POC Glucose 172 (H) 65 - 105 mg/dL   BLOOD GAS, VENOUS    Collection Time: 08/03/22  8:17 PM   Result Value Ref Range    pH, Alex 7.300 (L) 7.320 - 7.420    pCO2, Alex 50.3 39 - 55    pO2, Alex 47.9 30 - 50 HCO3, Venous 24.0 24 - 30 mmol/L    Negative Base Excess, Alex 2.1 (H) 0.0 - 2.0 mmol/L    O2 Sat, Alex 77.7 60.0 - 85.0 %    Carboxyhemoglobin 2.4 0 - 5 %    Pt Temp 37.0     FIO2 INFORMATION NOT PROVIDED    POC Glucose Fingerstick    Collection Time: 08/03/22 11:01 PM   Result Value Ref Range    POC Glucose 169 (H) 65 - 105 mg/dL   TSH with Reflex    Collection Time: 08/04/22 12:21 AM   Result Value Ref Range    TSH 1.10 0.30 - 5.00 uIU/mL   Lupus Anticoagulant    Collection Time: 08/04/22 12:21 AM   Result Value Ref Range    Anticardiolipin IgA PENDING APL    Anticardiolipin IgG PENDING GPL    Cardiolipin Ab IgM PENDING MPL    Dilute Viper Venom Time PENDING     Lupus Anticoag PENDING     Protime 10.7 9.1 - 12.3 sec    INR 1.0     PTT 23.2 20.5 - 30.5 sec   Homocysteine    Collection Time: 08/04/22 12:21 AM   Result Value Ref Range    Homocysteine 10.1 <15.0 umol/L   Basic Metabolic Panel w/ Reflex to MG    Collection Time: 08/04/22 12:21 AM   Result Value Ref Range    Glucose 162 (H) 70 - 99 mg/dL    BUN 7 6 - 20 mg/dL    Creatinine 0.65 0.50 - 0.90 mg/dL    Calcium 8.7 8.6 - 10.4 mg/dL    Sodium 140 135 - 144 mmol/L    Potassium 3.2 (L) 3.7 - 5.3 mmol/L    Chloride 107 98 - 107 mmol/L    CO2 22 20 - 31 mmol/L    Anion Gap 11 9 - 17 mmol/L    GFR Non-African American >60 >60 mL/min    GFR African American >60 >60 mL/min    GFR Comment         Magnesium    Collection Time: 08/04/22 12:21 AM   Result Value Ref Range    Magnesium 1.8 1.6 - 2.6 mg/dL   LIPID PANEL    Collection Time: 08/04/22  6:02 AM   Result Value Ref Range    Cholesterol 126 <200 mg/dL    HDL 40 (L) >40 mg/dL    LDL Cholesterol 62 0 - 130 mg/dL    Chol/HDL Ratio 3.2 <5    Triglycerides 119 <150 mg/dL   CBC with Auto Differential    Collection Time: 08/04/22  6:02 AM   Result Value Ref Range    WBC 5.5 3.5 - 11.3 k/uL    RBC 3.28 (L) 3.95 - 5.11 m/uL    Hemoglobin 10.5 (L) 11.9 - 15.1 g/dL    Hematocrit 31.8 (L) 36.3 - 47.1 %    MCV 97.0 82.6 - 102.9 fL    MCH 32.0 25.2 - 33.5 pg    MCHC 33.0 28.4 - 34.8 g/dL    RDW 12.1 11.8 - 14.4 %    Platelets 799 516 - 844 k/uL    MPV 11.5 8.1 - 13.5 fL    NRBC Automated 0.0 0.0 per 100 WBC    Seg Neutrophils 38 36 - 65 %    Lymphocytes 46 (H) 24 - 43 %    Monocytes 11 3 - 12 %    Eosinophils % 5 (H) 1 - 4 %    Basophils 1 0 - 2 %    Immature Granulocytes 0 0 %    Segs Absolute 2.10 1.50 - 8.10 k/uL    Absolute Lymph # 2.53 1.10 - 3.70 k/uL    Absolute Mono # 0.59 0.10 - 1.20 k/uL    Absolute Eos # 0.28 0.00 - 0.44 k/uL    Basophils Absolute 0.03 0.00 - 0.20 k/uL    Absolute Immature Granulocyte <0.03 0.00 - 0.30 k/uL   Basic Metabolic Panel w/ Reflex to MG    Collection Time: 08/04/22  6:02 AM   Result Value Ref Range    Glucose 339 (H) 70 - 99 mg/dL    BUN 7 6 - 20 mg/dL    Creatinine 0.73 0.50 - 0.90 mg/dL    Calcium 8.3 (L) 8.6 - 10.4 mg/dL    Sodium 141 135 - 144 mmol/L    Potassium 3.4 (L) 3.7 - 5.3 mmol/L    Chloride 108 (H) 98 - 107 mmol/L    CO2 22 20 - 31 mmol/L    Anion Gap 11 9 - 17 mmol/L    GFR Non-African American >60 >60 mL/min    GFR African American >60 >60 mL/min    GFR Comment         Magnesium    Collection Time: 08/04/22  6:02 AM   Result Value Ref Range    Magnesium 1.7 1.6 - 2.6 mg/dL   POC Glucose Fingerstick    Collection Time: 08/04/22  6:42 AM   Result Value Ref Range    POC Glucose 341 (H) 65 - 105 mg/dL     Recent Labs     08/04/22  0602   WBC 5.5   RBC 3.28*   HGB 10.5*   HCT 31.8*   MCV 97.0   MCH 32.0   MCHC 33.0   RDW 12.1      MPV 11.5     Recent Labs     08/03/22  1214 08/04/22  0021 08/04/22  0602      < > 141   K 3.5*   < > 3.4*      < > 108*   CO2 22   < > 22   BUN 8   < > 7   CREATININE 0.76   < > 0.73   GLUCOSE 421*   < > 339*   CALCIUM 9.2   < > 8.3*   PROT 6.9  --   --    LABALBU 3.8  --   --    BILITOT 0.33  --   --    ALKPHOS 103  --   --    AST 15  --   --    ALT 10  --   --     < > = values in this interval not displayed.      Hemoglobin A1C   Date Value Ref Range Status   04/22/2022 14.0 % Final     Comment:     >14     MRI brain wo limited       Assessment :      Primary Problem  Acute encephalopathy    Active Hospital Problems    Diagnosis Date Noted    Acute encephalopathy [G93.40] 06/13/2017    Hyperglycemia due to diabetes mellitus ([facility] [E11.65]     Bipolar 1 disorder, depressed, severe ([facility] [F31.4] 04/27/2016       Patient is a 56 y.o. female uncontrolled DM, bipolar disorder, presents with acute metabolic encephalopathy likely from DKA, DKA protocol was initiated. CT head low attenuation in the anterior left MCA distribution. CTA No LVO and CTP abnormal perfusion at left frontal lobe. MRI brain wo contrast showes Acute subacute left MCA/frontal infarct, moderate chronic microvascular disease. Plan:       Acute metabolic encephalopathy due to DKA, Stroke VS mass   CT head low attenuation in the anterior left MCA distribution. CTA No LVO   CTP abnormal perfusion at left frontal lobe. MRI brain wo contrast showes Acute subacute left MCA/frontal infarct, moderate chronic microvascular disease. MRI W contrast to r/u mass  Endovascular following  Consult neurosurgery  Continue ASA 81mg   On Lipitor 40 mg     Follow-up further recommendations after discussing the case with attending  The plan was discussed with the patient, patient's family and the medical staff. Consultations:   IP CONSULT TO IV TEAM  IP CONSULT TO NEUROLOGY  IP CONSULT TO ENDOVASCULAR NEUROSURGERY  IP CONSULT TO FAMILY MEDICINE  IP CONSULT TO SOCIAL WORK  IP CONSULT TO NEUROSURGERY  IP CONSULT TO PSYCHIATRY    Patient is admitted as inpatient status because of co-morbidities listed above, severity of signs and symptoms as outlined, requirement for current medical therapies and most importantly because of direct risk to patient if care not provided in a hospital setting.     Taz Zamudio MD  8/4/2022  9:31 AM    Copy sent to Dr. Nela Mahoney MD

## 2022-08-04 NOTE — CONSULTS
Department of Neurosurgery                                            Nurse Practitioner Consult Note      Reason for Consult:  stroke vs mass  Requesting Physician:  Dr Elin Cushing  Neurosurgeon:   [x] Dr. Barb Johnson  [] Dr. Edgar Sun  [] Dr. Britt Dutta  [] Dr. Kalani Lion      History Obtained From:  patient, electronic medical record    CHIEF COMPLAINT:         Chief Complaint   Patient presents with    Altered Mental Status    Hyperglycemia       HISTORY OF PRESENT ILLNESS:       The patient is a 56 y.o. female who presents after being found sitting in her wheelchair in the middle of street with confusion. All information obtained from chart. Patient stated she did not want to talk to me \"because I'm going through some stuff\". She did tell me she does not have any weakness and did answer orientation questions.    Refused physical exam.     PAST MEDICAL HISTORY :       Past Medical History:        Diagnosis Date    Acid reflux 5/29/2017    Acute cystitis with hematuria 10/11/2016    Acute non-recurrent maxillary sinusitis 11/28/2017    Asthma     Bipolar 1 disorder ([address] 1/4/2018    Bipolar disorder, mixed ([address]     BMI 34.0-34.9,adult 11/28/2017    Cannabis use disorder, severe, dependence ([address] 12/19/2017    Cerebrovascular accident (CVA) ([address] 6/14/2017    Chest pain 11/5/2016    Chronic renal insufficiency     Cocaine abuse ([address] 1/5/2018    COVID-19 virus RNA test result unknown     DDD (degenerative disc disease), cervical     Diabetes mellitus ([address]     Dizziness 6/13/2017    Fibromyalgia     History of stroke 9/9/2017    Homicidal ideation 11/6/2017    Hyperglycemia     Hypertension     Hypotension 1/18/2019    IDDM (insulin dependent diabetes mellitus) 12/21/2015    Lupus ([address]     Migraine     Neuropathy     Neuropathy     Polysubstance abuse ([address] 9/17/2017    Post traumatic stress disorder (PTSD)     Posttraumatic stress disorder 5/29/2017    Recurrent depression ([address] 5/29/2017    Recurrent major depressive disorder, in partial remission ([facility] 11/28/2017    Screening mammogram, encounter for 11/28/2017    Severe recurrent major depression with psychotic features ([address] 12/19/2017    Severe recurrent major depression without psychotic features ([address] 12/19/2017    Stroke (cerebrum) ([facility] 6/14/2017    Stroke ([facility]     per chart notes    Suicidal ideation     Suicidal intent 3/10/2017    Vitamin D deficiency 11/28/2017    White matter changes 6/13/2017       Past Surgical History:        Procedure Laterality Date    ABCESS DRAINAGE      right buttock    ABDOMINAL SURGERY      drain tube    CATARACT EXTRACTION W/  INTRAOCULAR LENS IMPLANT Bilateral     CHEST TUBE INSERTION      FINGER AMPUTATION Left 03/13/2021    LEFT RING FINER AMPUTATION performed by Hilario Myers MD at [address] [facility] Right 10/11/2021    AMPUTATION RIGHT INDEX FINGER performed by Hilario Myers MD at [address] [place] [address] Right 1/27/2022    FOOT ABSCESS INCISION AND DRAINAGE  (PULSE LAVAGE, CULTURE SWABS) [facility] [place] [address] Right 01/27/2022    FOOT ABSCESS INCISION AND DRAINAGE (PULSE LAVAGE, CULTURE SWABS) - Right    HAND SURGERY Right 09/14/2021    I&D INDEX FINGER performed by Hilario Myers MD at [address] Right 09/15/2021    I&D INDEX FINGER performed by Hilario Myers MD at [address]  2/3/2022         LASIK Bilateral        Social History:   Social History     Socioeconomic History    Marital status: Legally      Spouse name: Not on file    Number of children: Not on file    Years of education: Not on file    Highest education level: Not on file   Occupational History    Not on file   Tobacco Use    Smoking status: Never    Smokeless tobacco: Never   Vaping Use    Vaping Use: Never used   Substance and Sexual Activity    Alcohol use: Not Currently    Drug use: Yes     Types: Marijuana Shraddha Kumerrick), Cocaine     Comment: + Cocaine 2/2021 also, see Care Everywhere UDS    Sexual activity: Not Currently     Partners: Male   Other Topics Concern    Not on file   Social History Narrative    Not on file     Social Determinants of Health     Financial Resource Strain: Not on file   Food Insecurity: Not on file   Transportation Needs: Not on file   Physical Activity: Inactive    Days of Exercise per Week: 0 days    Minutes of Exercise per Session: 0 min   Stress: Stress Concern Present    Feeling of Stress : Rather much   Social Connections: Unknown    Frequency of Communication with Friends and Family: Once a week    Frequency of Social Gatherings with Friends and Family: Not on file    Attends Sikh Services: Not on file    Active Member of Clubs or Organizations: No    Attends Club or Organization Meetings: Never    Marital Status:    Intimate Partner Violence: Not on file   Housing Stability: Not on file       Family History:       Problem Relation Age of Onset    Diabetes Mother     Stroke Mother     Diabetes Father     Diabetes Sister     Diabetes Brother     Other Son         GSW    No Known Problems Sister        Allergies:  Bactrim [sulfamethoxazole-trimethoprim] and Adhesive tape    Home Medications:  Prior to Admission medications    Medication Sig Start Date End Date Taking? Authorizing Provider   pregabalin (LYRICA) 200 MG capsule Take 1 capsule by mouth daily for 30 days.  4/22/22 5/22/22  Uriel Bowen MD   insulin glargine (LANTUS SOLOSTAR) 100 UNIT/ML injection pen Inject 30 Units into the skin 2 times daily 4/22/22   Uriel Bowen MD   atorvastatin (LIPITOR) 40 MG tablet Take 1 tablet by mouth nightly 4/22/22 7/21/22  Uriel Bowen MD   acetaminophen (TYLENOL) 500 MG tablet Take 2 tablets by mouth 3 times daily as needed for Pain 3/15/22   Uriel Bowen MD   fluticasone (FLONASE) 50 MCG/ACT nasal spray 1 spray by Each Nostril route daily 3/15/22   Uriel Bowen MD   metFORMIN (GLUCOPHAGE) 1000 MG tablet Take 1 tablet by mouth 2 times daily (with meals) 3/15/22   Magda Leblanc MD   mirtazapine (REMERON) 7.5 MG tablet Take 1 tablet by mouth nightly 3/15/22   Magda Leblanc MD   pantoprazole (PROTONIX) 20 MG tablet Take 1 tablet by mouth 2 times daily (before meals) 3/15/22   Magda Leblanc MD   traZODone (DESYREL) 150 MG tablet Take 1 tablet by mouth nightly 3/15/22   Magda Leblanc MD   venlafaxine (EFFEXOR XR) 150 MG extended release capsule Take 1 capsule by mouth daily (with breakfast) 3/15/22   Magda Leblanc MD   Alcohol Swabs 70 % PADS Use 1 swab 3 times daily as needed 3/15/22   Magda Leblanc MD   blood glucose monitor strips Test 3 times a day & as needed for symptoms of irregular blood glucose. Dispense sufficient amount for indicated testing frequency plus additional to accommodate PRN testing needs. 3/15/22   Magda Leblanc MD   Lancets MISC 1 each by Does not apply route 3 times daily 3/15/22   Magda Leblanc MD   Insulin Pen Needle ([employer] PEN NEEDLES 31G) 31G X 8 MM MISC 1 each by Does not apply route daily 3/15/22   Magda Leblanc MD   FREESTYLE LITE strip 1 each by Does not apply route 3 times daily 3/15/22   Magda Leblanc MD   budesonide-formoterol (SYMBICORT) 80-4.5 MCG/ACT AERO Inhale 2 puffs into the lungs 2 times daily 3/15/22   Magda Leblanc MD   albuterol sulfate  (90 Base) MCG/ACT inhaler Inhale 2 puffs into the lungs every 4 hours as needed for Wheezing 3/15/22 4/15/22  Magda Leblanc MD   mupirocin (BACTROBAN) 2 % ointment Apply topically 3 times daily Apply topically to right foot wound two times a day for wound care. Apply to right foot wound, cover with Kerlix and ace wrap. Historical Provider, MD   Misc.  Devices MISC 1 PAIR OF DIABETIC SHOES (1 LEFT/ 1 RIGHT)  1-3 PAIRS OF INSERTS (LEFT/ RIGHT) 2/15/22   Marisol Vandana, DPM   dicyclomine (BENTYL) 10 MG capsule Take 1 capsule by mouth 4 times daily 12/17/21   Magda Leblanc MD ibuprofen (ADVIL;MOTRIN) 800 MG tablet Take 1 tablet by mouth every 8 hours as needed for Pain 11/15/21 11/29/21  Johnny Levine MD   FLOVENT  MCG/ACT inhaler Inhale 2 puffs into the lungs 2 times daily 10/20/20   Donato Alfrdeo MD       Current Medications:   Current Facility-Administered Medications: 0.9 % sodium chloride infusion, , IntraVENous, Continuous  insulin lispro (HUMALOG) injection vial 0-4 Units, 0-4 Units, SubCUTAneous, TID WC  insulin lispro (HUMALOG) injection vial 0-4 Units, 0-4 Units, SubCUTAneous, Nightly  aspirin chewable tablet 81 mg, 81 mg, Oral, Daily  glucose chewable tablet 16 g, 4 tablet, Oral, PRN  dextrose bolus 10% 125 mL, 125 mL, IntraVENous, PRN **OR** dextrose bolus 10% 250 mL, 250 mL, IntraVENous, PRN  glucagon (rDNA) injection 1 mg, 1 mg, IntraMUSCular, PRN  dextrose 5 % solution, 100 mL/hr, IntraVENous, PRN  potassium bicarb-citric acid (EFFER-K) effervescent tablet 20 mEq, 20 mEq, Oral, Once  albuterol sulfate HFA (PROVENTIL;VENTOLIN;PROAIR) 108 (90 Base) MCG/ACT inhaler 2 puff, 2 puff, Inhalation, Q4H PRN  atorvastatin (LIPITOR) tablet 40 mg, 40 mg, Oral, Nightly  budesonide-formoterol (SYMBICORT) 80-4.5 MCG/ACT inhaler 2 puff, 2 puff, Inhalation, BID  dicyclomine (BENTYL) capsule 10 mg, 10 mg, Oral, 4x Daily  insulin glargine (LANTUS) injection vial 30 Units, 30 Units, SubCUTAneous, BID  mirtazapine (REMERON) tablet 7.5 mg, 7.5 mg, Oral, Nightly  pantoprazole (PROTONIX) tablet 20 mg, 20 mg, Oral, BID AC  traZODone (DESYREL) tablet 150 mg, 150 mg, Oral, Nightly  venlafaxine (EFFEXOR XR) extended release capsule 150 mg, 150 mg, Oral, Daily with breakfast  sodium chloride flush 0.9 % injection 5-40 mL, 5-40 mL, IntraVENous, 2 times per day  sodium chloride flush 0.9 % injection 5-40 mL, 5-40 mL, IntraVENous, PRN  0.9 % sodium chloride infusion, , IntraVENous, PRN  ondansetron (ZOFRAN-ODT) disintegrating tablet 4 mg, 4 mg, Oral, Q8H PRN **OR** ondansetron (ZOFRAN) injection 4 mg, 4 mg, IntraVENous, Q6H PRN  polyethylene glycol (GLYCOLAX) packet 17 g, 17 g, Oral, Daily PRN  acetaminophen (TYLENOL) tablet 650 mg, 650 mg, Oral, Q6H PRN **OR** acetaminophen (TYLENOL) suppository 650 mg, 650 mg, Rectal, Q6H PRN  potassium chloride (KLOR-CON M) extended release tablet 40 mEq, 40 mEq, Oral, PRN **OR** potassium bicarb-citric acid (EFFER-K) effervescent tablet 40 mEq, 40 mEq, Oral, PRN **OR** potassium chloride 10 mEq/100 mL IVPB (Peripheral Line), 10 mEq, IntraVENous, PRN  magnesium sulfate 2000 mg in 50 mL IVPB premix, 2,000 mg, IntraVENous, PRN  heparin (porcine) injection 5,000 Units, 5,000 Units, SubCUTAneous, 3 times per day    REVIEW OF SYSTEMS:       Refused to answer    PHYSICAL EXAM:       BP 89/77   Pulse 85   Temp 97.9 °F (36.6 °C) (Temporal)   Resp 12   Wt 240 lb (108.9 kg)   LMP  (LMP Unknown)   SpO2 93%   BMI 39.94 kg/m²       CONSTITUTIONAL: no apparent distress, appears stated age   NEUROLOGIC:  EYE OPENING     Spontaneous - 4 [x]       To voice - 3 []       To pain - 2 []       None - 1 []    VERBAL RESPONSE     Appropriate, oriented - 5 [x]       Dazed or confused - 4 []       Syllables, expletives - 3 []       Grunts - 2 []       None - 1 []    MOTOR RESPONSE     Spontaneous, command - 6 [x]       Localizes pain - 5 []       Withdraws pain - 4 []       Abnormal flexion - 3 []       Abnormal extension - 2 []       None - 1 []            Total GCS: 15    Mental Status:  alert, refused physical exam               Motor Exam:        Moving all extremities           LABS AND IMAGING:     CBC with Differential:    Lab Results   Component Value Date/Time    WBC 5.5 08/04/2022 06:02 AM    RBC 3.28 08/04/2022 06:02 AM    HGB 10.5 08/04/2022 06:02 AM    HCT 31.8 08/04/2022 06:02 AM     08/04/2022 06:02 AM    MCV 97.0 08/04/2022 06:02 AM    MCH 32.0 08/04/2022 06:02 AM    MCHC 33.0 08/04/2022 06:02 AM    RDW 12.1 08/04/2022 06:02 AM    NRBC 2 09/20/2021 10:29 AM    LYMPHOPCT 46 08/04/2022 06:02 AM    MONOPCT 11 08/04/2022 06:02 AM    BASOPCT 1 08/04/2022 06:02 AM    MONOSABS 0.59 08/04/2022 06:02 AM    LYMPHSABS 2.53 08/04/2022 06:02 AM    EOSABS 0.28 08/04/2022 06:02 AM    BASOSABS 0.03 08/04/2022 06:02 AM    DIFFTYPE NOT REPORTED 02/08/2022 06:58 AM     BMP:    Lab Results   Component Value Date/Time     08/04/2022 06:02 AM    K 3.4 08/04/2022 06:02 AM     08/04/2022 06:02 AM    CO2 22 08/04/2022 06:02 AM    BUN 7 08/04/2022 06:02 AM    LABALBU 3.8 08/03/2022 12:14 PM    CREATININE 0.73 08/04/2022 06:02 AM    CALCIUM 8.3 08/04/2022 06:02 AM    GFRAA >60 08/04/2022 06:02 AM    LABGLOM >60 08/04/2022 06:02 AM    GLUCOSE 339 08/04/2022 06:02 AM       Radiology Review:    CT HEAD WO CONTRAST    Result Date: 8/3/2022  EXAMINATION: CT OF THE HEAD WITHOUT CONTRAST  8/3/2022 10:46 am TECHNIQUE: CT of the head was performed without the administration of intravenous contrast. Automated exposure control, iterative reconstruction, and/or weight based adjustment of the mA/kV was utilized to reduce the radiation dose to as low as reasonably achievable. COMPARISON: Head CT 11/04/2021 HISTORY: ORDERING SYSTEM PROVIDED HISTORY: [facility] TECHNOLOGIST PROVIDED HISTORY: [facility] Decision Support Exception - unselect if not a suspected or confirmed emergency medical condition->Emergency Medical Condition (MA) Is the patient pregnant?->No Reason for Exam: Altered Mental Status; Hyperglycemia FINDINGS: BRAIN/VENTRICLES: Assessment is motion degraded. Ill-defined regions of low attenuation in the anterior left MCA distribution involving the left frontal lobe extending into the subinsular region. The adjacent sulci are suboptimally delineated, though this may be exacerbated by motion artifact. Patchy regions of low attenuation in the periventricular and subcortical white matter which are mild to moderate and otherwise similar to prior. Mild parenchymal atrophy.  No acute intracranial hemorrhage or midline shift. No abnormal extra-axial fluid collection. There is no evidence of hydrocephalus. ORBITS: The visualized portion of the orbits demonstrate no acute abnormality. SINUSES: The visualized paranasal sinuses and mastoid air cells demonstrate no acute abnormality. SOFT TISSUES/SKULL:  No gross acute abnormality of the visualized skull or soft tissues. Ill-defined low attenuation in the anterior left MCA distribution which is new from prior comparison imaging and may be due to evolving infarct in the appropriate clinical setting. Assessment is overall motion degraded with possible associated mild sulcal effacement but no midline shift. No acute intracranial hemorrhage. CT BRAIN PERFUSION    Result Date: 8/3/2022  EXAMINATION: CTA OF THE HEAD AND NECK WITH CONTRAST; CTA OF THE HEAD WITH CONTRAST WITH PERFUSION 8/3/2022 5:19 pm: TECHNIQUE: CTA of the head and neck was performed with the administration of intravenous contrast. Multiplanar reformatted images are provided for review. MIP images are provided for review. Stenosis of the internal carotid arteries measured using NASCET criteria. Automated exposure control, iterative reconstruction, and/or weight based adjustment of the mA/kV was utilized to reduce the radiation dose to as low as reasonably achievable.; CTA of the head/brain was performed with the administration of intravenous contrast. Multiplanar reformatted images are provided for review. MIP images are provided for review. Automated exposure control, iterative reconstruction, and/or weight based adjustment of the mA/kV was utilized to reduce the radiation dose to as low as reasonably achievable.  COMPARISON: Noncontrast CT head from earlier today HISTORY: ORDERING SYSTEM PROVIDED HISTORY: [facility] acute right sided hemiparesis TECHNOLOGIST PROVIDED HISTORY: [facility] acute right sided hemiparesis Decision Support Exception - unselect if not a suspected or confirmed emergency medical condition->Emergency Medical Condition (MA) Reason for Exam: ams; best images per pt condition ; ORDERING SYSTEM PROVIDED HISTORY: [facility] acute onset right sided hemiparesis and aphasia TECHNOLOGIST PROVIDED HISTORY: [facility] acute onset right sided hemiparesis and aphasia Decision Support Exception - unselect if not a suspected or confirmed emergency medical condition->Emergency Medical Condition (MA) Reason for Exam: ams; best images per pt condition FINDINGS: CTA NECK: AORTIC ARCH/ARCH VESSELS: No dissection or arterial injury. No significant stenosis of the brachiocephalic or subclavian arteries. CAROTID ARTERIES: No dissection, arterial injury, or hemodynamically significant stenosis by NASCET criteria. VERTEBRAL ARTERIES: No dissection, arterial injury, or significant stenosis. SOFT TISSUES: There is ground-glass attenuation at the lung apices, likely related to underdistention versus pneumonitis. No cervical or superior mediastinal lymphadenopathy. The larynx and pharynx are unremarkable. No acute abnormality of the salivary and thyroid glands. BONES: No acute osseous abnormality. CTA HEAD: ANTERIOR CIRCULATION: There is up to 50% stenosis in the cavernous/supraclinoid segments of the bilateral internal carotid arteries, right worse than left. No significant stenosis of the anterior cerebral, or middle cerebral arteries. No aneurysm. POSTERIOR CIRCULATION: No significant stenosis of the vertebral, basilar, or posterior cerebral arteries. No aneurysm. OTHER: No dural venous sinus thrombosis on this non-dedicated study. BRAIN: There is decreased gray-white differentiation in the left frontal lobe, likely related to acute to subacute infarction, with associated local mass effect. There is enhancement in the area of infarction. No midline shift. No extra-axial fluid collection.  CT PERFUSION: EXAM QUALITY: The examination is diagnostic with appropriate arterial inflow and venous outflow curves, and diagnostic perfusion maps. There is abnormal perfusion in the left frontal lobe, likely related to acute to subacute infarction. CORE INFARCT: The total area of ischemic core is 0 mL (CBF<30% volume). TOTAL HYPOPERFUSION: The total area of hypoperfusion is 21 mL (Tmax>6s volume). PENUMBRA: The penumbra (mismatch) volume is 21 mL and is located in the left occipital lobe, likely related to artifact. The mismatch ratio is n/a.     1. Decreased gray-white differentiation in the left frontal lobe, likely related to acute to subacute infarction, with associated local mass effect. 2. Abnormal perfusion in the left frontal lobe, likely related to acute to subacute infarction. 3. Perfusion mismatch in left occipital lobe, likely related to artifact. 4. Up to 50% stenosis in the cavernous/supraclinoid segments of the bilateral internal carotid arteries, right worse than left. Otherwise, no acute abnormality or flow-limiting stenosis of the major arteries of the head. 5. No acute abnormality or flow-limiting stenosis of the major arteries of the neck. CTA HEAD NECK W CONTRAST    Result Date: 8/3/2022  EXAMINATION: CTA OF THE HEAD AND NECK WITH CONTRAST; CTA OF THE HEAD WITH CONTRAST WITH PERFUSION 8/3/2022 5:19 pm: TECHNIQUE: CTA of the head and neck was performed with the administration of intravenous contrast. Multiplanar reformatted images are provided for review. MIP images are provided for review. Stenosis of the internal carotid arteries measured using NASCET criteria. Automated exposure control, iterative reconstruction, and/or weight based adjustment of the mA/kV was utilized to reduce the radiation dose to as low as reasonably achievable.; CTA of the head/brain was performed with the administration of intravenous contrast. Multiplanar reformatted images are provided for review. MIP images are provided for review.  Automated exposure control, iterative reconstruction, and/or weight based adjustment of the mA/kV was utilized to reduce the radiation dose to as low as reasonably achievable. COMPARISON: Noncontrast CT head from earlier today HISTORY: ORDERING SYSTEM PROVIDED HISTORY: [facility] acute right sided hemiparesis TECHNOLOGIST PROVIDED HISTORY: [facility] acute right sided hemiparesis Decision Support Exception - unselect if not a suspected or confirmed emergency medical condition->Emergency Medical Condition (MA) Reason for Exam: ams; best images per pt condition ; ORDERING SYSTEM PROVIDED HISTORY: [facility] acute onset right sided hemiparesis and aphasia TECHNOLOGIST PROVIDED HISTORY: [facility] acute onset right sided hemiparesis and aphasia Decision Support Exception - unselect if not a suspected or confirmed emergency medical condition->Emergency Medical Condition (MA) Reason for Exam: ams; best images per pt condition FINDINGS: CTA NECK: AORTIC ARCH/ARCH VESSELS: No dissection or arterial injury. No significant stenosis of the brachiocephalic or subclavian arteries. CAROTID ARTERIES: No dissection, arterial injury, or hemodynamically significant stenosis by NASCET criteria. VERTEBRAL ARTERIES: No dissection, arterial injury, or significant stenosis. SOFT TISSUES: There is ground-glass attenuation at the lung apices, likely related to underdistention versus pneumonitis. No cervical or superior mediastinal lymphadenopathy. The larynx and pharynx are unremarkable. No acute abnormality of the salivary and thyroid glands. BONES: No acute osseous abnormality. CTA HEAD: ANTERIOR CIRCULATION: There is up to 50% stenosis in the cavernous/supraclinoid segments of the bilateral internal carotid arteries, right worse than left. No significant stenosis of the anterior cerebral, or middle cerebral arteries. No aneurysm. POSTERIOR CIRCULATION: No significant stenosis of the vertebral, basilar, or posterior cerebral arteries. No aneurysm. OTHER: No dural venous sinus thrombosis on this non-dedicated study.  BRAIN: There is decreased gray-white differentiation in the left frontal lobe, likely related to acute to subacute infarction, with associated local mass effect. There is enhancement in the area of infarction. No midline shift. No extra-axial fluid collection. CT PERFUSION: EXAM QUALITY: The examination is diagnostic with appropriate arterial inflow and venous outflow curves, and diagnostic perfusion maps. There is abnormal perfusion in the left frontal lobe, likely related to acute to subacute infarction. CORE INFARCT: The total area of ischemic core is 0 mL (CBF<30% volume). TOTAL HYPOPERFUSION: The total area of hypoperfusion is 21 mL (Tmax>6s volume). PENUMBRA: The penumbra (mismatch) volume is 21 mL and is located in the left occipital lobe, likely related to artifact. The mismatch ratio is n/a.     1. Decreased gray-white differentiation in the left frontal lobe, likely related to acute to subacute infarction, with associated local mass effect. 2. Abnormal perfusion in the left frontal lobe, likely related to acute to subacute infarction. 3. Perfusion mismatch in left occipital lobe, likely related to artifact. 4. Up to 50% stenosis in the cavernous/supraclinoid segments of the bilateral internal carotid arteries, right worse than left. Otherwise, no acute abnormality or flow-limiting stenosis of the major arteries of the head. 5. No acute abnormality or flow-limiting stenosis of the major arteries of the neck. MRI LIMITED BRAIN    Result Date: 8/3/2022  EXAMINATION: MRI OF THE BRAIN WITHOUT CONTRAST  8/3/2022 6:44 pm TECHNIQUE: Multiplanar multisequence MRI of the brain was performed without the administration of intravenous contrast. COMPARISON: CT angiogram head neck and CT perfusion 08/03/2022 HISTORY: ORDERING SYSTEM PROVIDED HISTORY: LEFT MCA distribution stroke TECHNOLOGIST PROVIDED HISTORY: LEFT MCA distribution stroke Reason for Exam: Left MCA - Distribution stroke.  FINDINGS: INTRACRANIAL STRUCTURES/VENTRICLES: Left frontal/left MCA distribution infarct with corresponding diffusion hyperintensity and T2 FLAIR hyperintensity. Petechial blood products noted. .  Mild focal mass effect. Otherwise mild generalized involutional change. Moderate chronic microvascular disease. No shift of midline structure. Basal cisterns are patent. No mass effect or midline shift. No evidence of an acute intracranial hemorrhage. The ventricles and sulci are normal in size and configuration. The sellar/suprasellar regions appear unremarkable. The normal signal voids within the major intracranial vessels appear maintained. ORBITS: The visualized portion of the orbits demonstrate no acute abnormality. SINUSES: Mild sinus disease. BONES/SOFT TISSUES: The bone marrow signal intensity appears normal. The soft tissues demonstrate no acute abnormality. Acute subacute left MCA/frontal infarct. Mild petechial blood products. Otherwise moderate chronic microvascular disease.          ASSESSMENT AND PLAN:       Patient Active Problem List   Diagnosis    IDDM (insulin dependent diabetes mellitus)    Lupus (HCC)    Post traumatic stress disorder (PTSD)    Acute cystitis with hematuria    Hyperglycemia due to diabetes mellitus (HCC)    Suicidal ideation    Arthritis    Chronic back pain    Acid reflux    History of stroke    Polysubstance abuse (HCC)    Cocaine abuse (HCC)    Chest pain    Acute non-recurrent maxillary sinusitis    Acute respiratory failure with hypercapnia (HCC)    Alcohol use disorder, severe, dependence (HCC)    Asthma exacerbation attacks    Bilateral edema of lower extremity    Bipolar 1 disorder, depressed, severe (HCC)    BMI 34.0-34.9,adult    Cannabis use disorder, severe, dependence ([address]    Cocaine use disorder, severe, dependence ([address]    Dizziness    Dyslipidemia    Family history of colon cancer    History of lupus    Homicidal ideation    Hypotension    Neuropathy    Absolute anemia    Recurrent depression (HCC)    Recurrent major depressive disorder, in partial remission (HCC)    Severe recurrent major depression with psychotic features (HCC)    Severe recurrent major depression without psychotic features ([address]    Upper GI bleed    Vitamin D deficiency    Acute encephalopathy    Gastroesophageal reflux disease    Brain lesion    Rib lesion    Diabetes mellitus type 2 in obese ([facility])    YAEL (generalized anxiety disorder)    Posttraumatic stress disorder    Suicidal intent    Leg weakness, bilateral    Poorly controlled diabetes mellitus ([facility])    Felon of finger    Osteomyelitis (HCC)    Recurrent falls    Seasonal allergic rhinitis    Fibromyalgia    Tenosynovitis of finger    Open wound of right index finger    Adverse effect of COVID-19 vaccine    Wound dehiscence    Amputation finger    Abscess of right index finger    Type 2 diabetes mellitus without complication, without long-term current use of insulin ([facility])    Major depression    Right foot infection    Cellulitis and abscess of toe of right foot    Abscess of right foot    Bilateral cellulitis of lower leg related to related to  uncontrolled diabetes    Bipolar disorder, current episode mixed, unspecified ([facility])    Right foot ulcer, with fat layer exposed ([address]    Ulcer of left foot, with fat layer exposed ([address]    CRP elevated    Status post incision and drainage    Intractable headache    Type 2 diabetes mellitus with diabetic autonomic neuropathy, with long-term current use of insulin ([facility])    Somnolence         A/P:  This is a 56 y.o. female with stroke vs tumor     Patient care will be discussed with attending, will reevaluated patient along with attending.      - Neurosurgical intervention pending MRI brain with contrast findings and review by attending neurosurgeon  - f/u MRI brain with contrast      Additional recommendations may follow    Please contact neurosurgery with any changes in patients neurologic status. Thank you for your consult.        LAILA Santacruz - CNP NS pager [phone]  8/4/2022  4:13 PM

## 2022-08-04 NOTE — CARE COORDINATION
Consult received for safe housing and to complete PHQ-9 screen.   Pt has been seen by SW in the past    Attempted to meet with pt - pt would not open eyes or respond to SW but was moving legs around in bed when SW asking questions    Will f/u at a later time    1125 - Addendum  Attempted to meet with pt again - eyes closed, would not respond to questions

## 2022-08-04 NOTE — ED NOTES
Pt returned from MRI pt sleeping on cart with no complaints       Erasto Maldonado RN  08/03/22 2044

## 2022-08-04 NOTE — RT PROTOCOL NOTE
Evaluated pt, vitals BS clear, HR 54, RR 14, SaO2 on room air 98%, Pt stated she is not on home O2. Oxygen therapy not indicated @ this time. Pt setting up on side of bed, no distress noted.

## 2022-08-04 NOTE — FLOWSHEET NOTE
Patient approached to go down to echo/testing. Patient opened eyes adamantly yelled no, and rolled over, covering herself with blankets. Attempted to get patient to verbalize what is bothering her that she does not want to get tests done, and patient continues to not participate with conversation.  MD notified

## 2022-08-04 NOTE — CARE COORDINATION
Transitional planning note: attempted to meet with patient to complete initial transitional planning assessment. Introduced self to patient and asked if we could discuss transitional planning. The patient responded by stating, \"What?!\" And then turned over on her side in bed. No current emergency contacts listed in patient's chart. Will re-attempt assessment at a later time.

## 2022-08-04 NOTE — H&P
45 [facility]  History & Physical Examination Note              Date:   8/3/2022  Patient name:  Jose Juan Arndt  Date of admission:  8/3/2022 11:47 AM  MRN:   [id number]  YOB: 1965    CHIEF COMPLAINT:     AMS    Chief Complaint   Patient presents with    Altered Mental Status    Hyperglycemia       History Obtained From:  Patient and chart review. HPI:     The patient is a 56 y.o.  female with history of uncontrolled DM, asthma, bipolar disorder, morbid obesity who presented with with altered mental status. Patient was found in the middle of the road in her wheelchair. Was brought to the ED by EMS for altered mental status. Patient is a poor historian, laying in bed with a blanket over her head, not very cooperative with answering questions. Denies any complaints at present. At ER , basic work-up showed hyperglycemia 406, hypokalemia(K3.5), Trop 28. Beta hydroxybutyrate 1.48. No leukocytosis, hemoglobin 10.7. VBG was done and showed pH of 7.3. Ammonia level was checked and was unremarkable. CXR and KUB were negative. Patient was given 10 units of Humalog as part of a plan to start DKA protocol however glucose dropped to 172 and it was plan to continue with subcutaneous insulin for hyperglycemia management. CT head that showed left frontal infarct, neurology and neurovascular surgery was consulted, CTA head and MRI brain were done and showed possible subacute stroke versus possible brain tumor.     and she is admitted to the hospital for the management of hyperglycemia and altered mental status    PAST MEDICAL HISTORY:        has a past medical history of Acid reflux, Acute cystitis with hematuria, Acute non-recurrent maxillary sinusitis, Asthma, Bipolar 1 disorder ([address] Bipolar disorder, mixed ([address] BMI 34.0-34.9,adult, Cannabis use disorder, severe, dependence ([address] Cerebrovascular accident (CVA) ([address] Chest pain, acetaminophen (TYLENOL) 500 MG tablet Take 2 tablets by mouth 3 times daily as needed for Pain 3/15/22   Cookie Alcaraz MD   fluticasone (FLONASE) 50 MCG/ACT nasal spray 1 spray by Each Nostril route daily 3/15/22   Cookie Alcaraz MD   metFORMIN (GLUCOPHAGE) 1000 MG tablet Take 1 tablet by mouth 2 times daily (with meals) 3/15/22   Cookie Alcaraz MD   mirtazapine (REMERON) 7.5 MG tablet Take 1 tablet by mouth nightly 3/15/22   Cookie Alcaraz MD   pantoprazole (PROTONIX) 20 MG tablet Take 1 tablet by mouth 2 times daily (before meals) 3/15/22   Cookie Alcaraz MD   traZODone (DESYREL) 150 MG tablet Take 1 tablet by mouth nightly 3/15/22   Cookie Alcaraz MD   venlafaxine (EFFEXOR XR) 150 MG extended release capsule Take 1 capsule by mouth daily (with breakfast) 3/15/22   Cookie Alacraz MD   Alcohol Swabs 70 % PADS Use 1 swab 3 times daily as needed 3/15/22   Cookie Alcaraz MD   blood glucose monitor strips Test 3 times a day & as needed for symptoms of irregular blood glucose. Dispense sufficient amount for indicated testing frequency plus additional to accommodate PRN testing needs. 3/15/22   Cookie Alcaraz MD   Lancets MISC 1 each by Does not apply route 3 times daily 3/15/22   Cookie Alcaraz MD   Insulin Pen Needle ([employer] PEN NEEDLES 31G) 31G X 8 MM MISC 1 each by Does not apply route daily 3/15/22   Cookie Alcaraz MD   FREESTYLE LITE strip 1 each by Does not apply route 3 times daily 3/15/22   Cookie Alcaraz MD   budesonide-formoterol (SYMBICORT) 80-4.5 MCG/ACT AERO Inhale 2 puffs into the lungs 2 times daily 3/15/22   Cookie Alcaraz MD   albuterol sulfate  (90 Base) MCG/ACT inhaler Inhale 2 puffs into the lungs every 4 hours as needed for Wheezing 3/15/22 4/15/22  Cookie Alcaraz MD   mupirocin (BACTROBAN) 2 % ointment Apply topically 3 times daily Apply topically to right foot wound two times a day for wound care.  Apply to right foot wound, cover with Kerlix and ace wrap. Historical Provider, MD   Misc. Devices MISC 1 PAIR OF DIABETIC SHOES (1 LEFT/ 1 RIGHT)  1-3 PAIRS OF INSERTS (LEFT/ RIGHT) 2/15/22   Niesha Easley DPM   dicyclomine (BENTYL) 10 MG capsule Take 1 capsule by mouth 4 times daily 12/17/21   Johanna Marcus MD   ibuprofen (ADVIL;MOTRIN) 800 MG tablet Take 1 tablet by mouth every 8 hours as needed for Pain 11/15/21 11/29/21  Kim Oliva MD   FLOVENT  MCG/ACT inhaler Inhale 2 puffs into the lungs 2 times daily 10/20/20   Johanna Marcus MD       ALLERGIES:      Bactrim [sulfamethoxazole-trimethoprim] and Adhesive tape    SOCIAL HISTORY:      reports that she has never smoked. She has never used smokeless tobacco. She reports that she does not currently use alcohol. She reports current drug use. Drugs: Marijuana (Weed) and Cocaine. REVIEW OF SYSTEMS:     Unable to assess due to altered mental status. PHYSICAL EXAM:     Vitals:    08/03/22 1150 08/03/22 1151 08/03/22 1730   BP:  116/82    Pulse: (!) 102     Resp: 15     Temp: 99.5 °F (37.5 °C)     TempSrc: Oral     SpO2: 94%     Weight:   240 lb (108.9 kg)       No intake or output data in the 24 hours ending 08/03/22 2049    General Appearance  Alert , awake , oriented x 3  HEENT - Head is normocephalic, atraumatic. Lungs - Bilateral equal air entry , no wheezes, rales or rhonchi, aeration good  Cardiovascular - Heart sounds are normal.  Regular rhythm, normal rate without murmur, gallop or rub. Abdomen - Soft, nontender, nondistended, no masses or organomegaly  Neurologic - There are no new focal motor or sensory deficits, muscle strength 5/5 in all extremities, normal muscle tone and bulk, no abnormal sensation.   Extremities - No  edema  Psych -uncooperative, tangential speech       DIAGNOSTICS:      Laboratory Testing:    Recent Results (from the past 24 hour(s))   Mixed Venous Gas, POC    Collection Time: 08/03/22 12:08 PM   Result Value Ref Range    PH MIXED 7.378 7.310 - 7.410 %    Segs Absolute 4.47 1.50 - 8.10 k/uL    Absolute Lymph # 2.13 1.10 - 3.70 k/uL    Absolute Mono # 0.72 0.10 - 1.20 k/uL    Absolute Eos # 0.16 0.00 - 0.44 k/uL    Basophils Absolute 0.06 0.00 - 0.20 k/uL    Absolute Immature Granulocyte <0.03 0.00 - 0.30 k/uL   Comprehensive Metabolic Panel    Collection Time: 08/03/22 12:14 PM   Result Value Ref Range    Glucose 421 (HH) 70 - 99 mg/dL    BUN 8 6 - 20 mg/dL    Creatinine 0.76 0.50 - 0.90 mg/dL    Calcium 9.2 8.6 - 10.4 mg/dL    Sodium 138 135 - 144 mmol/L    Potassium 3.5 (L) 3.7 - 5.3 mmol/L    Chloride 103 98 - 107 mmol/L    CO2 22 20 - 31 mmol/L    Anion Gap 13 9 - 17 mmol/L    Alkaline Phosphatase 103 35 - 104 U/L    ALT 10 5 - 33 U/L    AST 15 <32 U/L    Total Bilirubin 0.33 0.3 - 1.2 mg/dL    Total Protein 6.9 6.4 - 8.3 g/dL    Albumin 3.8 3.5 - 5.2 g/dL    Albumin/Globulin Ratio 1.2 1.0 - 2.5    GFR Non-African American >60 >60 mL/min    GFR African American >60 >60 mL/min    GFR Comment         Beta-Hydroxybutyrate    Collection Time: 08/03/22 12:14 PM   Result Value Ref Range    Beta-Hydroxybutyrate 1.48 (H) 0.02 - 0.27 mmol/L   Ammonia    Collection Time: 08/03/22 12:14 PM   Result Value Ref Range    Ammonia 28 11 - 51 umol/L   TOX SCR, BLD, ED    Collection Time: 08/03/22 12:14 PM   Result Value Ref Range    Acetaminophen Level <5 (L) 10 - 30 ug/mL    Ethanol <10 <10 mg/dL    Ethanol percent <6.607 <6.128 %    Salicylate Lvl 1 (L) 3 - 10 mg/dL    Toxic Tricyclic Sc,Blood NEGATIVE NEGATIVE   Troponin    Collection Time: 08/03/22 12:14 PM   Result Value Ref Range    Troponin, High Sensitivity 28 (H) 0 - 14 ng/L   Lactic Acid    Collection Time: 08/03/22 12:15 PM   Result Value Ref Range    Lactic Acid, Whole Blood 1.2 0.7 - 2.1 mmol/L   Culture, Blood 1    Collection Time: 08/03/22 12:18 PM    Specimen: Blood   Result Value Ref Range    Specimen Description . BLOOD     Culture NO GROWTH <24 HRS    BLOOD GAS, VENOUS    Collection Time: 08/03/22  3:52 PM Result Value Ref Range    pH, Alex 7.280 (L) 7.320 - 7.420    pCO2, Alex 50.9 39 - 55    pO2, Alex 40.4 30 - 50    HCO3, Venous 23.2 (L) 24 - 30 mmol/L    Negative Base Excess, Alex 3.3 (H) 0.0 - 2.0 mmol/L    O2 Sat, Alex 65.6 60.0 - 85.0 %    Carboxyhemoglobin 2.2 0 - 5 %    Pt Temp 37.0     FIO2 INFORMATION NOT PROVIDED    POC Glucose Fingerstick    Collection Time: 08/03/22  5:47 PM   Result Value Ref Range    POC Glucose 515 (HH) 65 - 105 mg/dL   POC Glucose Fingerstick    Collection Time: 08/03/22  7:54 PM   Result Value Ref Range    POC Glucose 172 (H) 65 - 105 mg/dL   BLOOD GAS, VENOUS    Collection Time: 08/03/22  8:17 PM   Result Value Ref Range    pH, Alex 7.300 (L) 7.320 - 7.420    pCO2, Alex 50.3 39 - 55    pO2, Alex 47.9 30 - 50    HCO3, Venous 24.0 24 - 30 mmol/L    Negative Base Excess, Alex 2.1 (H) 0.0 - 2.0 mmol/L    O2 Sat, Alex 77.7 60.0 - 85.0 %    Carboxyhemoglobin 2.4 0 - 5 %    Pt Temp 37.0     FIO2 INFORMATION NOT PROVIDED          Imaging/[employer]agonstics:  XR ABDOMEN (KUB) (SINGLE AP VIEW)    Result Date: 8/3/2022  EXAMINATION: ONE SUPINE XRAY VIEW(S) OF THE ABDOMEN 8/3/2022 5:53 pm COMPARISON: 01/27/2022 HISTORY: ORDERING SYSTEM PROVIDED HISTORY: FOR STAT MRI TECHNOLOGIST PROVIDED HISTORY: FOR STAT MRI FINDINGS: Residual contrast in the urinary tract and mildly distended urinary bladder from recent CT. Mild fecal stasis in the colon with a nonspecific nonobstructive bowel gas pattern as visualized. Faintly visualized pelvic phleboliths. No acute osseous abnormality is seen. No metallic foreign bodies are seen on the images obtained. No acute findings. Contrast in the urinary tract from recent CT.      CT HEAD WO CONTRAST    Result Date: 8/3/2022  EXAMINATION: CT OF THE HEAD WITHOUT CONTRAST  8/3/2022 10:46 am TECHNIQUE: CT of the head was performed without the administration of intravenous contrast. Automated exposure control, iterative reconstruction, and/or weight based adjustment of the mA/kV was utilized to reduce the radiation dose to as low as reasonably achievable. COMPARISON: Head CT 11/04/2021 HISTORY: ORDERING SYSTEM PROVIDED HISTORY: ams TECHNOLOGIST PROVIDED HISTORY: [facility] Decision Support Exception - unselect if not a suspected or confirmed emergency medical condition->Emergency Medical Condition (MA) Is the patient pregnant?->No Reason for Exam: Altered Mental Status; Hyperglycemia FINDINGS: BRAIN/VENTRICLES: Assessment is motion degraded. Ill-defined regions of low attenuation in the anterior left MCA distribution involving the left frontal lobe extending into the subinsular region. The adjacent sulci are suboptimally delineated, though this may be exacerbated by motion artifact. Patchy regions of low attenuation in the periventricular and subcortical white matter which are mild to moderate and otherwise similar to prior. Mild parenchymal atrophy. No acute intracranial hemorrhage or midline shift. No abnormal extra-axial fluid collection. There is no evidence of hydrocephalus. ORBITS: The visualized portion of the orbits demonstrate no acute abnormality. SINUSES: The visualized paranasal sinuses and mastoid air cells demonstrate no acute abnormality. SOFT TISSUES/SKULL:  No gross acute abnormality of the visualized skull or soft tissues. Ill-defined low attenuation in the anterior left MCA distribution which is new from prior comparison imaging and may be due to evolving infarct in the appropriate clinical setting. Assessment is overall motion degraded with possible associated mild sulcal effacement but no midline shift. No acute intracranial hemorrhage.      XR CHEST PORTABLE    Result Date: 8/3/2022  EXAMINATION: ONE XRAY VIEW OF THE CHEST 8/3/2022 12:27 pm COMPARISON: November 4, 2021 HISTORY: ORDERING SYSTEM PROVIDED HISTORY: ams TECHNOLOGIST PROVIDED HISTORY: ams Reason for Exam: Altered mental status/  AP erect/ port FINDINGS: Allowing for differences in technique, stable cardiomediastinal silhouette. No significant pulmonary edema. No convincing lung consolidation or infiltrate. No pleural effusion or pneumothorax. Osseous structures grossly intact. No acute cardiopulmonary process     CT BRAIN PERFUSION    Result Date: 8/3/2022  EXAMINATION: CTA OF THE HEAD AND NECK WITH CONTRAST; CTA OF THE HEAD WITH CONTRAST WITH PERFUSION 8/3/2022 5:19 pm: TECHNIQUE: CTA of the head and neck was performed with the administration of intravenous contrast. Multiplanar reformatted images are provided for review. MIP images are provided for review. Stenosis of the internal carotid arteries measured using NASCET criteria. Automated exposure control, iterative reconstruction, and/or weight based adjustment of the mA/kV was utilized to reduce the radiation dose to as low as reasonably achievable.; CTA of the head/brain was performed with the administration of intravenous contrast. Multiplanar reformatted images are provided for review. MIP images are provided for review. Automated exposure control, iterative reconstruction, and/or weight based adjustment of the mA/kV was utilized to reduce the radiation dose to as low as reasonably achievable.  COMPARISON: Noncontrast CT head from earlier today HISTORY: ORDERING SYSTEM PROVIDED HISTORY: [facility] acute right sided hemiparesis TECHNOLOGIST PROVIDED HISTORY: [facility] acute right sided hemiparesis Decision Support Exception - unselect if not a suspected or confirmed emergency medical condition->Emergency Medical Condition (MA) Reason for Exam: ams; best images per pt condition ; ORDERING SYSTEM PROVIDED HISTORY: [facility] acute onset right sided hemiparesis and aphasia TECHNOLOGIST PROVIDED HISTORY: [facility] acute onset right sided hemiparesis and aphasia Decision Support Exception - unselect if not a suspected or confirmed emergency medical condition->Emergency Medical Condition (MA) Reason for Exam: ams; best images per pt condition FINDINGS: CTA NECK: AORTIC ARCH/ARCH VESSELS: No dissection or arterial injury. No significant stenosis of the brachiocephalic or subclavian arteries. CAROTID ARTERIES: No dissection, arterial injury, or hemodynamically significant stenosis by NASCET criteria. VERTEBRAL ARTERIES: No dissection, arterial injury, or significant stenosis. SOFT TISSUES: There is ground-glass attenuation at the lung apices, likely related to underdistention versus pneumonitis. No cervical or superior mediastinal lymphadenopathy. The larynx and pharynx are unremarkable. No acute abnormality of the salivary and thyroid glands. BONES: No acute osseous abnormality. CTA HEAD: ANTERIOR CIRCULATION: There is up to 50% stenosis in the cavernous/supraclinoid segments of the bilateral internal carotid arteries, right worse than left. No significant stenosis of the anterior cerebral, or middle cerebral arteries. No aneurysm. POSTERIOR CIRCULATION: No significant stenosis of the vertebral, basilar, or posterior cerebral arteries. No aneurysm. OTHER: No dural venous sinus thrombosis on this non-dedicated study. BRAIN: There is decreased gray-white differentiation in the left frontal lobe, likely related to acute to subacute infarction, with associated local mass effect. There is enhancement in the area of infarction. No midline shift. No extra-axial fluid collection. CT PERFUSION: EXAM QUALITY: The examination is diagnostic with appropriate arterial inflow and venous outflow curves, and diagnostic perfusion maps. There is abnormal perfusion in the left frontal lobe, likely related to acute to subacute infarction. CORE INFARCT: The total area of ischemic core is 0 mL (CBF<30% volume). TOTAL HYPOPERFUSION: The total area of hypoperfusion is 21 mL (Tmax>6s volume). PENUMBRA: The penumbra (mismatch) volume is 21 mL and is located in the left occipital lobe, likely related to artifact.   The mismatch ratio is n/a.     1. Decreased gray-white differentiation in the left frontal lobe, likely related to acute to subacute infarction, with associated local mass effect. 2. Abnormal perfusion in the left frontal lobe, likely related to acute to subacute infarction. 3. Perfusion mismatch in left occipital lobe, likely related to artifact. 4. Up to 50% stenosis in the cavernous/supraclinoid segments of the bilateral internal carotid arteries, right worse than left. Otherwise, no acute abnormality or flow-limiting stenosis of the major arteries of the head. 5. No acute abnormality or flow-limiting stenosis of the major arteries of the neck. CTA HEAD NECK W CONTRAST    Result Date: 8/3/2022  EXAMINATION: CTA OF THE HEAD AND NECK WITH CONTRAST; CTA OF THE HEAD WITH CONTRAST WITH PERFUSION 8/3/2022 5:19 pm: TECHNIQUE: CTA of the head and neck was performed with the administration of intravenous contrast. Multiplanar reformatted images are provided for review. MIP images are provided for review. Stenosis of the internal carotid arteries measured using NASCET criteria. Automated exposure control, iterative reconstruction, and/or weight based adjustment of the mA/kV was utilized to reduce the radiation dose to as low as reasonably achievable.; CTA of the head/brain was performed with the administration of intravenous contrast. Multiplanar reformatted images are provided for review. MIP images are provided for review. Automated exposure control, iterative reconstruction, and/or weight based adjustment of the mA/kV was utilized to reduce the radiation dose to as low as reasonably achievable.  COMPARISON: Noncontrast CT head from earlier today HISTORY: ORDERING SYSTEM PROVIDED HISTORY: [facility] acute right sided hemiparesis TECHNOLOGIST PROVIDED HISTORY: [facility] acute right sided hemiparesis Decision Support Exception - unselect if not a suspected or confirmed emergency medical condition->Emergency Medical Condition (MA) Reason for Exam: ams; best images per pt condition ; ORDERING SYSTEM PROVIDED HISTORY: [facility] acute onset right sided hemiparesis and aphasia TECHNOLOGIST PROVIDED HISTORY: [facility] acute onset right sided hemiparesis and aphasia Decision Support Exception - unselect if not a suspected or confirmed emergency medical condition->Emergency Medical Condition (MA) Reason for Exam: ams; best images per pt condition FINDINGS: CTA NECK: AORTIC ARCH/ARCH VESSELS: No dissection or arterial injury. No significant stenosis of the brachiocephalic or subclavian arteries. CAROTID ARTERIES: No dissection, arterial injury, or hemodynamically significant stenosis by NASCET criteria. VERTEBRAL ARTERIES: No dissection, arterial injury, or significant stenosis. SOFT TISSUES: There is ground-glass attenuation at the lung apices, likely related to underdistention versus pneumonitis. No cervical or superior mediastinal lymphadenopathy. The larynx and pharynx are unremarkable. No acute abnormality of the salivary and thyroid glands. BONES: No acute osseous abnormality. CTA HEAD: ANTERIOR CIRCULATION: There is up to 50% stenosis in the cavernous/supraclinoid segments of the bilateral internal carotid arteries, right worse than left. No significant stenosis of the anterior cerebral, or middle cerebral arteries. No aneurysm. POSTERIOR CIRCULATION: No significant stenosis of the vertebral, basilar, or posterior cerebral arteries. No aneurysm. OTHER: No dural venous sinus thrombosis on this non-dedicated study. BRAIN: There is decreased gray-white differentiation in the left frontal lobe, likely related to acute to subacute infarction, with associated local mass effect. There is enhancement in the area of infarction. No midline shift. No extra-axial fluid collection. CT PERFUSION: EXAM QUALITY: The examination is diagnostic with appropriate arterial inflow and venous outflow curves, and diagnostic perfusion maps. There is abnormal perfusion in the left frontal lobe, likely related to acute to subacute infarction.  CORE INFARCT: The total area of ischemic core is 0 mL (CBF<30% volume). TOTAL HYPOPERFUSION: The total area of hypoperfusion is 21 mL (Tmax>6s volume). PENUMBRA: The penumbra (mismatch) volume is 21 mL and is located in the left occipital lobe, likely related to artifact. The mismatch ratio is n/a.     1. Decreased gray-white differentiation in the left frontal lobe, likely related to acute to subacute infarction, with associated local mass effect. 2. Abnormal perfusion in the left frontal lobe, likely related to acute to subacute infarction. 3. Perfusion mismatch in left occipital lobe, likely related to artifact. 4. Up to 50% stenosis in the cavernous/supraclinoid segments of the bilateral internal carotid arteries, right worse than left. Otherwise, no acute abnormality or flow-limiting stenosis of the major arteries of the head. 5. No acute abnormality or flow-limiting stenosis of the major arteries of the neck. MRI LIMITED BRAIN    Result Date: 8/3/2022  EXAMINATION: MRI OF THE BRAIN WITHOUT CONTRAST  8/3/2022 6:44 pm TECHNIQUE: Multiplanar multisequence MRI of the brain was performed without the administration of intravenous contrast. COMPARISON: CT angiogram head neck and CT perfusion 08/03/2022 HISTORY: ORDERING SYSTEM PROVIDED HISTORY: LEFT MCA distribution stroke TECHNOLOGIST PROVIDED HISTORY: LEFT MCA distribution stroke Reason for Exam: Left MCA - Distribution stroke. FINDINGS: INTRACRANIAL STRUCTURES/VENTRICLES: Left frontal/left MCA distribution infarct with corresponding diffusion hyperintensity and T2 FLAIR hyperintensity. Petechial blood products noted. .  Mild focal mass effect. Otherwise mild generalized involutional change. Moderate chronic microvascular disease. No shift of midline structure. Basal cisterns are patent. No mass effect or midline shift. No evidence of an acute intracranial hemorrhage. The ventricles and sulci are normal in size and configuration. The sellar/suprasellar regions appear unremarkable. The normal signal voids within the major intracranial vessels appear maintained. ORBITS: The visualized portion of the orbits demonstrate no acute abnormality. SINUSES: Mild sinus disease. BONES/SOFT TISSUES: The bone marrow signal intensity appears normal. The soft tissues demonstrate no acute abnormality. Acute subacute left MCA/frontal infarct. Mild petechial blood products. Otherwise moderate chronic microvascular disease. ASSESSMENT:       Principal Problem:    Acute encephalopathy  Active Problems:    Hyperglycemia due to diabetes mellitus (HCC)    Bipolar 1 disorder, depressed, severe (HCC)  Resolved Problems:    * No resolved hospital problems.  *      PLAN:     Patient status: Admit the patient as Inpatient in the Progressive Unit     Acute encephalopathy likely secondary to stroke versus brain tumor    CT head showed left frontal infarct  CTA head and neck showed possible acute to subacute infarct left frontal lobe  MRI brain without contrast showed acute subacute left MCA/frontal infarct, possible tumor      -Neurology and neuro endovascular was consulted  -MRI brain with contrast ordered for tomorrow  -Aspirin 81 mg daily  -Lipitor 40 mg daily  -Follow-up lipid panel, TSH and hemoglobin A1c  -Hypercoagulable work-up was ordered by neurology  -Consult oncologic neurosurgery for possible brain tumor      Hyperglycemia secondary to uncontrolled type II diabetes    Glucose 406 on admission, trended down to 172 with 10 units of Humalog  Metabolic acidosis improved with IV fluid  Beta hydroxybutyrate mildly elevated at 1.42  Deferred insulin drip and DKA protocol in the setting of improved glucose    -Continue IV fluid  -Continue home dose of Lantus 30 units twice daily  -BMP every 4 hours and monitor for recurrence of DKA  -Insulin sliding scale  -Hypoglycemia protocol  -Follow-up A1c    Bipolar disorder    Unclear if patient is compliant with her medications    - continue remeron 7.5 mg PO nightly  - effexor  mg po daily  - trazadone 150 mg PO nightly  -Psych consult    DVT prophylaxis: heparin (porcine) injection 5,000 TID  GI prophylaxis: Famotidine 20 mg BID      Consultations:   Consults: IP CONSULT TO IV TEAM  IP CONSULT TO NEUROLOGY  IP CONSULT TO ENDOVASCULAR NEUROSURGERY  IP CONSULT TO FAMILY MEDICINE  PT/OT      Above plan discussed with the patient who agreed to the above plan     Plan will be discussed with the attending, Dr. Bran Krishnan MD  Family Medicine Resident  8/3/2022 8:49 PM

## 2022-08-04 NOTE — PROGRESS NOTES
Endovascular Neurosurgery Progress Note    SUBJECTIVE:     No overnight event, pt is not cooperative this AM, she refused MRI     Review of Systems:  CONSTITUTIONAL:  negative for fevers, chills, fatigue and malaise    EYES:  negative for double vision, blurred vision and photophobia     HEENT:  negative for tinnitus, epistaxis and sore throat    RESPIRATORY:  negative for cough, shortness of breath, wheezing    CARDIOVASCULAR:  negative for chest pain, palpitations, syncope, edema    GASTROINTESTINAL:  negative for nausea, vomiting    GENITOURINARY:  negative for incontinence    MUSCULOSKELETAL:  negative for neck or back pain    NEUROLOGICAL:  Negative for weakness and tingling  negative for headaches and dizziness    PSYCHIATRIC:  negative for anxiety      Review of systems otherwise negative. OBJECTIVE:     Vitals:    08/04/22 0640   BP: (!) 90/59   Pulse: 83   Resp: 13   Temp: 97.9 °F (36.6 °C)   SpO2: 94%        General:  Gen: normal habitus, NAD  HEENT: NCAT, mucosa moist  Cvs: RRR, S1 S2 normal  Resp: symmetric unlabored breathing  Abd: s/nd/nt  Ext: no edema  Skin: no lesions seen, warm and dry    Neuro:  Gen: awake and alert, oriented x2 not oriented to time  Lang/speech: no aphasia or dysarthria. Follows commands. CN: PERRL, EOMI, VFF, V1-3 intact, face symmetric, hearing intact, shoulder shrug symmetric, tongue midline  Motor: grossly 5/5 UE and LE b/l  Sense: LT intact in all 4 ext. Coord: FTN and HTS intact b/l  DTR: deferred  Gait: narrow base gait    NIH Stroke Scale:   1a  Level of consciousness: 0 - alert; keenly responsive   1b. LOC questions:  0 - answers both questions correctly   1c. LOC commands: 0 - performs both tasks correctly   2. Best Gaze: 0 - normal   3. Visual: 0 - no visual loss   4. Facial Palsy: 0 - normal symmetric movement   5a. Motor left arm: 0 - no drift, limb holds 90 (or 45) degrees for full 10 seconds   5b.   Motor right arm: 0 - no drift, limb holds 90 (or 45) 7  Stroke, [facility]  [id number]  Electronically signed 8/4/2022 at 7:53 AM

## 2022-08-04 NOTE — PROGRESS NOTES
[address]  Speech Language Pathology    Date: 8/4/2022  Patient Name: Meredith Mcneill  YOB: 1965   AGE: 56 y.o. MRN: [id number]        Patient Not Available for Speech Therapy     Due to:  [] Testing  [] Hemodialysis  [] Cancelled by RN  [] Surgery   [] Intubation/Sedation/Pain Medication  [] Medical instability  [x] Other:  Pt. Refused to speak with SLP, shaking head no and stating no after all questions presented. Next scheduled treatment: 8/5/22    Completed by:  Jing Marlow, SLP, .[facility]

## 2022-08-05 VITALS
DIASTOLIC BLOOD PRESSURE: 77 MMHG | WEIGHT: 240 LBS | OXYGEN SATURATION: 93 % | RESPIRATION RATE: 12 BRPM | SYSTOLIC BLOOD PRESSURE: 89 MMHG | BODY MASS INDEX: 39.94 KG/M2 | TEMPERATURE: 97.9 F | HEART RATE: 78 BPM

## 2022-08-05 LAB
ACTIVATED PROTEIN C RESISTANCE: 3.41
AT-III ACTIVITY: 114 % (ref 83–122)

## 2022-08-05 NOTE — DISCHARGE SUMMARY
Department of [address] [place] [address]    Discharge Summary      NAME:  Jayda Askew  :  1965  MRN:  [id number]    Admit date:  8/3/2022  Discharge date:  2022    Admitting Physician:  Angela Tsai MD    Primary Diagnosis on Admission:   Present on Admission:   Hyperglycemia due to diabetes mellitus ([facility]   Bipolar 1 disorder, depressed, severe ([facility]   Acute encephalopathy   Brain lesion   Type 2 diabetes mellitus with diabetic autonomic neuropathy, with long-term current use of insulin (HCC)   Somnolence   Cerebral infarction due to embolism of left middle cerebral artery ([facility]      Secondary Diagnoses:  does not have any pertinent problems on file. Admission Condition:  poor     Discharged Condition: poor, pt left [facility]    Hospital Course: The patient was admitted for the management of acute encephalopathy likely 2/2 brain tumor. Pt was uncooperative during hospital stay. Refused insulin, labs and vitals. Unresponsive during questioning and physical exam. Left AMA despite instructions on risks and dangers in leaving without treatment. Consults:  neurology, vascular surgery    Significant Diagnostic/theraputic interventions: XR Abdomen, CT head without contrast, CXR, CT head and neck with contrast, CT brain, MRI brain without contrast, MRI brain with contrast      Disposition:   Pt left AMA. Was educated on dangers of leaving hospital without treatment. Instructions to Patient: Pt left AMA despite instructions against it. Follow up with Pete Baker MD in  10 days    Discharge Medications:       Medication List        CONTINUE taking these medications      Alcohol Swabs 70 % Pads  Use 1 swab 3 times daily as needed     [employer] Pen Needles 31G 31G X 8 MM Misc  Generic drug: Insulin Pen Needle  1 each by Does not apply route daily     Lancets Misc  1 each by Does not apply route 3 times daily     Misc.  Devices Misc  1 PAIR OF DIABETIC SHOES (1 LEFT/ 1 RIGHT)  1-3 PAIRS OF INSERTS (LEFT/ RIGHT)            ASK your doctor about these medications      acetaminophen 500 MG tablet  Commonly known as: TYLENOL  Take 2 tablets by mouth 3 times daily as needed for Pain     albuterol sulfate  (90 Base) MCG/ACT inhaler  Commonly known as: PROVENTIL;VENTOLIN;PROAIR  Inhale 2 puffs into the lungs every 4 hours as needed for Wheezing     atorvastatin 40 MG tablet  Commonly known as: LIPITOR  Take 1 tablet by mouth nightly     * blood glucose test strips  Test 3 times a day & as needed for symptoms of irregular blood glucose. Dispense sufficient amount for indicated testing frequency plus additional to accommodate PRN testing needs.      * FREESTYLE LITE strip  Generic drug: blood glucose test strips  1 each by Does not apply route 3 times daily     budesonide-formoterol 80-4.5 MCG/ACT Aero  Commonly known as: Symbicort  Inhale 2 puffs into the lungs 2 times daily     dicyclomine 10 MG capsule  Commonly known as: Bentyl  Take 1 capsule by mouth 4 times daily     Flovent  MCG/ACT inhaler  Generic drug: fluticasone  Inhale 2 puffs into the lungs 2 times daily     fluticasone 50 MCG/ACT nasal spray  Commonly known as: FLONASE  1 spray by Each Nostril route daily     ibuprofen 800 MG tablet  Commonly known as: ADVIL;MOTRIN  Take 1 tablet by mouth every 8 hours as needed for Pain     Lantus SoloStar 100 UNIT/ML injection pen  Generic drug: insulin glargine  Inject 30 Units into the skin 2 times daily     metFORMIN 1000 MG tablet  Commonly known as: GLUCOPHAGE  Take 1 tablet by mouth 2 times daily (with meals)     mirtazapine 7.5 MG tablet  Commonly known as: REMERON  Take 1 tablet by mouth nightly     mupirocin 2 % ointment  Commonly known as: BACTROBAN     pantoprazole 20 MG tablet  Commonly known as: PROTONIX  Take 1 tablet by mouth 2 times daily (before meals)     pregabalin 200 MG capsule  Commonly known as: LYRICA  Take 1 capsule by mouth daily for 30 days. traZODone 150 MG tablet  Commonly known as: DESYREL  Take 1 tablet by mouth nightly     venlafaxine 150 MG extended release capsule  Commonly known as: EFFEXOR XR  Take 1 capsule by mouth daily (with breakfast)           * This list has 2 medication(s) that are the same as other medications prescribed for you. Read the directions carefully, and ask your doctor or other care provider to review them with you. Send Copies to: Fadumo Gage MD,       Note that over 30 minutes was spent in preparing discharge papers, discussing discharge with patient and family, medication review, etc.      Anthony Rodriguez MD  Family Medicine Resident  Family Medicine Inpatient Service  8/5/2022 3:58 PM          Please note that this chart was generated using voice recognition Dragon dictation software.   Although every effort was made to ensure the accuracy of this automated transcription, some errors in transcription may have occurred

## 2022-08-05 NOTE — DISCHARGE INSTR - COC
Continuity of Care Form    Patient Name: Srikanth Alfredo   :  1965  MRN:  [id number]    Admit date:  8/3/2022  Discharge date:  ***    Code Status Order: Full Code   Advance Directives:     Admitting Physician:  Mishel Luong MD  PCP: Abby Montague MD    Discharging Nurse: [facility] Unit/Room#: 2141/8681-89  Discharging Unit Phone Number: ***    Emergency Contact:   No emergency contact information on file.     Past Surgical History:  Past Surgical History:   Procedure Laterality Date    ABCESS DRAINAGE      right buttock    ABDOMINAL SURGERY      drain tube    CATARACT EXTRACTION W/  INTRAOCULAR LENS IMPLANT Bilateral     CHEST TUBE INSERTION      FINGER AMPUTATION Left 2021    LEFT RING FINER AMPUTATION performed by Juana Torrez MD at [facility] Right 10/11/2021    AMPUTATION RIGHT INDEX FINGER performed by Juana Torrez MD at [address] [place] [address] Right 2022    FOOT ABSCESS INCISION AND DRAINAGE  (PULSE LAVAGE, CULTURE SWABS) performed by Jameson Mami DPM at [place] [address] Right 2022    FOOT ABSCESS INCISION AND DRAINAGE (PULSE LAVAGE, CULTURE SWABS) - Right    HAND SURGERY Right 2021    I&D INDEX FINGER performed by Juana Torrez MD at [facility] Right 09/15/2021    I&D INDEX FINGER performed by Juana Torrez MD at [address]  2/3/2022         LASIK Bilateral        Immunization History:   Immunization History   Administered Date(s) Administered    COVID-19, PFIZER PURPLE top, DILUTE for use, (age 15 y+), 30mcg/0.3mL 10/05/2021, 2021    Tdap (Boostrix, Adacel) 2017, 2020, 2021, 2022       Active Problems:  Patient Active Problem List   Diagnosis Code    IDDM (insulin dependent diabetes mellitus) [id number]    Lupus ([facility] M32.9    Post traumatic stress disorder (PTSD) F43.10    Acute cystitis with hematuria N30.01    Hyperglycemia due to diabetes mellitus ([facility] E11.65    Suicidal ideation R45.851    Arthritis M19.90    Chronic back pain M54.9, G89.29    Acid reflux K21.9    History of stroke Z86.73    Polysubstance abuse ([facility]) F19.10    Cocaine abuse ([facility] F14.10    Chest pain R07.9    Acute non-recurrent maxillary sinusitis J01.00    Acute respiratory failure with hypercapnia ([facility]) J96.02    Alcohol use disorder, severe, dependence ([facility]) F10.20    Asthma exacerbation attacks J45.901    Bilateral edema of lower extremity R60.0    Bipolar 1 disorder, depressed, severe ([facility]) F31.4    BMI 34.0-34.9,adult Z68.34    Cannabis use disorder, severe, dependence ([facility]) F12.20    Cocaine use disorder, severe, dependence ([facility]) F14.20    Dizziness R42    Dyslipidemia E78.5    Family history of colon cancer Z80.0    History of lupus [id number]    Homicidal ideation R45.850    Hypotension I95.9    Neuropathy G62.9    Absolute anemia D64.9    Recurrent depression ([facility]) F33.9    Recurrent major depressive disorder, in partial remission ([facility]) F33.41    Severe recurrent major depression with psychotic features ([facility] F33.3    Severe recurrent major depression without psychotic features ([facility] F33.2    Upper GI bleed K92.2    Vitamin D deficiency E55.9    Acute encephalopathy G93.40    Gastroesophageal reflux disease K21.9    Brain lesion G93.9    Rib lesion M89.9    Diabetes mellitus type 2 in obese ([facility]) E11.69, E66.9    YAEL (generalized anxiety disorder) F41.1    Posttraumatic stress disorder F43.10    Suicidal intent R45.851    Leg weakness, bilateral R29.898    Poorly controlled diabetes mellitus ([facility]) E11.65    Felon of finger L03.019    Osteomyelitis ([facility]) M86.9    Recurrent falls R29.6    Seasonal allergic rhinitis J30.2    Fibromyalgia M79.7    Tenosynovitis of finger M65.9    Open wound of right index finger S61.200A    Adverse effect of COVID-19 vaccine T50. B95A    Wound dehiscence T81.30XA    Amputation finger S68.119A    Abscess of right index finger L02.511 Admin  {[facility] DME NFQR:149391718}  Med Delivery   { [facility] MED Delivery:424208809}    Wound Care Documentation and Therapy:        Elimination:  Continence: Bowel: {YES / LP:70320}  Bladder: {YES / CP:45909}  Urinary Catheter: {Urinary Catheter:687884823}   Colostomy/Ileostomy/Ileal Conduit: {YES / YP:15986}       Date of Last BM: ***    Intake/Output Summary (Last 24 hours) at 2022 0925  Last data filed at 2022 1605  Gross per 24 hour   Intake --   Output 600 ml   Net -600 ml     I/O last 3 completed shifts:   In: 500 [P.O.:500]  Out: 600 [Urine:600]    Safety Concerns:     [employer] Safety Concerns:154969949}    Impairments/Disabilities:      [employer] Impairments/Disabilities:674926099}    Nutrition Therapy:  Current Nutrition Therapy:   [employer] Diet List:570876601}    Routes of Feeding: {[facility] DME Other Feedings:030011946}  Liquids: {Slp liquid thickness:55196}  Daily Fluid Restriction: {[facility] DME Yes amt example:646856021}  Last Modified Barium Swallow with Video (Video Swallowing Test): {Done Not Done HWWD:569266741}    Treatments at the Time of Hospital Discharge:   Respiratory Treatments: ***  Oxygen Therapy:  {Therapy; copd oxygen:04340}  Ventilator:    { CC Vent IGJX:505882811}    Rehab Therapies: {THERAPEUTIC INTERVENTION:0965683703}  Weight Bearing Status/Restrictions: [address] [employer] Weight [employer]}  Other Medical Equipment (for information only, NOT a DME order):  {EQUIPMENT:19673}  Other Treatments: ***    Patient's personal belongings (please select all that are sent with patient):  {[facility] DME Belongings:478885264}    RN SIGNATURE:  {Esignature:767659414}    CASE MANAGEMENT/SOCIAL WORK SECTION    Inpatient Status Date: ***    Readmission Risk Assessment Score:  Readmission Risk              Risk of Unplanned Readmission:  [id number]           Discharging to Facility/ Agency   Name:   Address:  Phone:  Fax:    Dialysis Facility (if applicable)   Name:  Address:  Dialysis Schedule:  Phone:  Fax:    / signature: {Esignature:062073698}    PHYSICIAN SECTION    Prognosis: Fair    Condition at Discharge: Stable    Rehab Potential (if transferring to Rehab): Fair    Recommended Labs or Other Treatments After Discharge:     Physician Certification: I certify the above information and transfer of Danny Pelayo  is necessary for the continuing treatment of the diagnosis listed and that she requires Home Care for less 30 days.      Update Admission H&P: No change in H&P    PHYSICIAN SIGNATURE:  Electronically signed by Ike Pratt MD on 8/5/22 at 9:26 AM EDT

## 2022-08-05 NOTE — PLAN OF CARE
Problem: Discharge Planning  Goal: Discharge to home or other facility with appropriate resources  Outcome: Progressing  Flowsheets (Taken 8/5/2022 0408)  Discharge to home or other facility with appropriate resources:   Identify barriers to discharge with patient and caregiver   Arrange for needed discharge resources and transportation as appropriate   Identify discharge learning needs (meds, wound care, etc)  Note: Patient updated on plan of care , denies any needs at this time      Problem: Safety - Adult  Goal: Free from fall injury  Outcome: Progressing  Note: Patient remains safe and free from fall during admission      Problem: Skin/Tissue Integrity  Goal: Absence of new skin breakdown  Description: 1. Monitor for areas of redness and/or skin breakdown  2. Assess vascular access sites hourly  3. Every 4-6 hours minimum:  Change oxygen saturation probe site  4. Every 4-6 hours:  If on nasal continuous positive airway pressure, respiratory therapy assess nares and determine need for appliance change or resting period.   Outcome: Progressing  Note: Patient has no signs of new skin breakdown

## 2022-08-05 NOTE — PROGRESS NOTES
Pt refused morning vitals and glucose check. Dr. Deborah Leavitt aware and states will get psych consult.

## 2022-08-05 NOTE — PROGRESS NOTES
Dr. Emily Abraham Datt aware that pt is requesting to leave AMA. Writer asked physician via secure message at [phone] if he would like to speak with pt before she left unit with no response. Pt denies suicidal or homicidal ideation. Pt is A&Ox4. Pt was educated by Chayo Riddle about her health conditions and the dangers of leaving the hospital without treatment. Pt states she understands the dangers and needs to leave to go home [facility]. \" Pt left unit in personal wheelchair independently. Pt left unit with all documented belongings. Pt in no distress at this time.  Electronically signed by Cordell Wild RN on 8/5/2022 at 10:08 AM

## 2022-08-06 LAB
PROTEIN C ANTIGEN: 91 % (ref 63–153)
PROTEIN S ANTIGEN, TOTAL: 143 % (ref 63–126)

## 2022-08-07 LAB — FACTOR VIII ACTIVITY: 236 % (ref 50–150)

## 2022-08-08 ENCOUNTER — CARE COORDINATION (OUTPATIENT)
Dept: FAMILY MEDICINE CLINIC | Age: 57
End: 2022-08-08

## 2022-08-08 ENCOUNTER — TELEPHONE (OUTPATIENT)
Dept: FAMILY MEDICINE CLINIC | Age: 57
End: 2022-08-08

## 2022-08-08 LAB
CULTURE: NORMAL
FACTOR V MUTATION: NEGATIVE
MTHFR INTERPRETATION: NORMAL
MTHFR MUTATION A1286C: NEGATIVE
MTHFR MUTATION C665T: NEGATIVE
SPECIMEN DESCRIPTION: NORMAL
SPECIMEN: NORMAL
SPECIMEN: NORMAL

## 2022-08-08 NOTE — CARE COORDINATION
Post Acute Care Discharge in home Assessment     Review purpose of home vs with: Carl Ch  Date: 22    - To evaluate the client after hospital discharge  - To ensure the client has received and understands self care instructions  - To identify and prevent potential adverse events  - To provide additional education and initiate post acute services     10:30 am Phone call received from [facility] office- [address] requesting follow up with Carl Doloresma today for safety check. Carl Ch left the hospital [facility] on 2022 and staff at [facility] office have been unable to contact patient by telephone   Home vs made at 3:30 pm today for safety check and to complete a post actue care discharge assessment     Deidra Palmer   : 1965 Phone #: [phone] (home)    1. Hospital Information    Discharged from:  [facility]  patient left AMA on 2022   Discharge to home  Discharge Date: patient left AMA on 2022   Admission Date: 8/3/2022    Non-face-to-face services provided:  Obtained and reviewed discharge summary and/or continuity of care documents    Hospital records: obtained and reviewed    Was instructed to follow up in:  7-10 days    Hospital Diagnosis: acute encephalopathy       Hospital Consultants:  Family Practice, neurology, endovascular neurology    Initial contact Date: 2022  Type of Contact:  in person      2. Assessment of Current Condition      Questions: How have you felt since discharge from the hospital:     Carl Ch reports feeling very tired since returning home    Did you receive a discharge summary from the hospital? Patient left AMA   She was not able to locate discharge paperwork     Is there any lingering or new fever? No    Are you eating and drinking OK? Carl Ch reports she has not felt hungry and has not eaten today. Reports she is drinking fluids    Are there any new complaints of pain? No    If you have a wound - is the dressing clean, dry, and intact? N/A    [employer]in[employer] Education    Does the patient know -   1. What to do if her symptoms worsen? yes   2. For what symptoms she should call the doctor?  yes   3. What symptoms she should get help with right away? Matt Solares had difficulty with recall of instructions she received today   4. Does she know how to contact her physician? Yes    Are there any questions about the patients medications? Yes   Medication adherence packaging in the home did not have all of her current medications. Medication adherence packaging in the home was dated 5/9/2022. Medication adherence packages did not include the following medications - pantoprazole 20 mg, pregabalin 200 mg. Medication adherence packaging included dicyclomine 10 mg 4 x day- not on current medication schedule. Unable to determine if Jaunita Spatz has been taking inhaler medications. Unable to locate the inhaler medications in there home at time of visit. She reports she took Lantus insulin last evening and states she was unable to recall the dose of insulin that she took. Does the patient have a list of all the medications that were prescribed to be taken after discharge? no - She did not have a medication list that she could locate   Does the patient have all the medications that were prescribed? No   Is the patient taking all the prescribed medications? No   Does the patient understand what all the medications are taken for? Jaunita Spatz was not able to recall medication schedule and unable to determine if she could understand what the medication are taken for.         ( Update medication list and refresh medication smartlinks below)        All Active Meds in Chart - (keep all current active meds, add hospital meds)  Current Outpatient Medications   Medication Sig Dispense Refill    atorvastatin (LIPITOR) 40 MG tablet Take 1 tablet by mouth nightly 90 tablet 3    metFORMIN (GLUCOPHAGE) 1000 MG tablet Take 1 tablet by mouth 2 times daily (with meals) 60 tablet with Kerlix and ace wrap. Misc. Devices MISC 1 PAIR OF DIABETIC SHOES (1 LEFT/ 1 RIGHT)  1-3 PAIRS OF INSERTS (LEFT/ RIGHT) 2 each 0    ibuprofen (ADVIL;MOTRIN) 800 MG tablet Take 1 tablet by mouth every 8 hours as needed for Pain 42 tablet 1    FLOVENT  MCG/ACT inhaler Inhale 2 puffs into the lungs 2 times daily 1 Inhaler 3     No current facility-administered medications for this visit. Current Medications (record all meds as 'taken' or 'not taken' in home med activity)  Outpatient Medications Marked as Taking for the 8/8/22 encounter (Care Coordination) with Nimco Pradhan RN   Medication Sig Dispense Refill    atorvastatin (LIPITOR) 40 MG tablet Take 1 tablet by mouth nightly 90 tablet 3    metFORMIN (GLUCOPHAGE) 1000 MG tablet Take 1 tablet by mouth 2 times daily (with meals) 60 tablet 3    mirtazapine (REMERON) 7.5 MG tablet Take 1 tablet by mouth nightly 30 tablet 3    traZODone (DESYREL) 150 MG tablet Take 1 tablet by mouth nightly 30 tablet 3    venlafaxine (EFFEXOR XR) 150 MG extended release capsule Take 1 capsule by mouth daily (with breakfast) 30 capsule 3    Insulin Pen Needle ([employer] PEN NEEDLES 31G) 31G X 8 MM MISC 1 each by Does not apply route daily 100 each 3    dicyclomine (BENTYL) 10 MG capsule Take 1 capsule by mouth 4 times daily 120 capsule 3         Are there any questions about how the patient should take care of herself? Yes    Brett Duran reports she had not eaten today prior to home vs and provided vague responses to questions   Unable to determine if she is performing her self care activities for management of diabetes and post stroke care   Does the patient understand her diet regimen? She reports she has not eaten prior to home vs and was not able to recall dietary intake in past 2-3 days   Does the patient understand his or her activity level? yes   Does the patient understand how to care for her wound? N/A   Does the patient understand how to monitor her weight? N/A   Does the patient understand what to do if she becomes short of breath? yes   Does the patient understand what to do if she has increased edema? yes      Is the patient having any trouble with ADL's? No   Any problems showering or bathing? No   Does the patient have trouble eating or feeding themselves? No   Is the patient having trouble getting out of bed or going to the toilet? No   Is the patient able to move around as expected? Yes   She denies falls      Home care services initiated: no     Services provided:  no outpatient services currently      Does that patient have equipment needs?  no   Does the patient have the appropriate equipment? She has blood glucose meter but was unable to locate   Can the patient use the equipment properly and safely? Unable to determine if she is using blood glucose meter routinely    Reinforce Follow-Up  - Does patient have an appointment with her PCP? yes  - Does patient have an appointment with her specialist physicians? No      Follow up appointment date: (7 days for more severe illness, 14 days for others)  Future Appointments   Date Time Provider Jj Hernandez   8/12/2022  3:00 PM Katerina Rubio MD [facility]       Other Interventions or Actions taken as result of  Assessment  - Jaunita Spatz was alert, oriented to person and place  not oriented to time. Affect- blunted    She was slow to answer questions and verbal responses were appropriate. Speech clear. She denies falls. Hand grasp equal and moderately strong, equal facial symmetry, Tongue in midline. She denies blurred vision. Denies headache. Dorsal plantar flexion equal and strong. B/P 112/70 right upper arm sitting. She was instructed on importance of medical follow up post hospitalization  She was agreeable to scheduling of post hospital follow up with PCP and transitional care visit with PCP scheduled on 8/12/2022 at 3 pm at [facility] office Steven Teran.    Jaunita Spatz reports she is not currently connected with behavioral health services. She reports she was previously engaged with  Eunice Bose and with [facility]   She was agreeable to re engaging with [place]. Brett Duran was strongly advised to additionally engage with addiction services of [facility] and states she will consider. She became tearful during discussion of engagement with behavioral and addiction services and was agreeable to follow up with behavioral health     [address] [place] [address] services to follow up with patient within 2 days.         Nimco Pradhan RN, BSN  [employer]

## 2022-08-08 NOTE — TELEPHONE ENCOUNTER
[facility] Transitions Initial Follow Up Call    Outreach made within 2 business days of discharge: No    Patient: Justyn Human   Patient : 1965   MRN: [id number]    Reason for Admission: Acute encephalopathy  Discharge Date: 2022       Spoke with: no one    Discharge department/facility: [address] Interactive Patient Contact:  Was patient able to fill all prescriptions: No:   Was patient instructed to bring all medications to the follow-up visit: No: no working phone number  Is patient taking all medications as directed in the discharge summary? Unable to reach patient  Does patient understand their discharge instructions: No: patient left hospital AMA  Does patient have questions or concerns that need addressed prior to 7-14 day follow up office visit: no    Patient doesn't have a working phone number listed in chart     Follow Up  No future appointments.     Roberto Carlos Perez LPN

## 2022-08-09 LAB
ANTICARDIOLIPIN IGA ANTIBODY: 4.8 APL (ref 0–14)
ANTICARDIOLIPIN IGG ANTIBODY: 1.4 GPL (ref 0–10)
CARDIOLIPIN AB IGM: <0.8 MPL (ref 0–10)
DILUTE RUSSELL VIPER VENOM TIME: NORMAL
FACTOR VII ACTIVITY: 85 % (ref 50–150)
INR BLD: 1
PARTIAL THROMBOPLASTIN TIME: 23.2 SEC (ref 20.5–30.5)
PROTHROMBIN G20210A MUTATION: NEGATIVE
PROTHROMBIN TIME: 10.7 SEC (ref 9.1–12.3)
PT PCR SPECIMEN: NORMAL

## 2022-08-11 ENCOUNTER — COMMUNITY OUTREACH (OUTPATIENT)
Dept: CARE COORDINATION | Age: 57
End: 2022-08-11

## 2022-08-11 NOTE — PROGRESS NOTES
met with pt on 8/10/22 to drop off prescriptions that have been left at [address] [place] [address] since 6/22.  also scheduled pt a cab for her PCP F/U on 8/12/22. Pt was sitting in lobby area, pupils were dilated, pt was rocking in her seat and she kept rubbing her hands together. She spoke to writer in a very monotone voice and was confused about which day her appointment was. Shirar repeated to pt several times that her appt was on Friday 8/12/22 and she was repeating back to me \" my appointment is tomorrow\" writer told her it was in 2 days on Friday, pt finally said \" I don't have to worry about tomorrow, writer told pt that was correct.  handed pt her medications from [address] [place] [address], pt thanked writer for picking them up. Pt did not look into bag of prescriptions to check and make sure they were all there, she folded the bag up and laid it on her seat. Writer asked pt if she needed anything else and she stated not at this time.

## 2022-08-17 ENCOUNTER — HOSPITAL ENCOUNTER (EMERGENCY)
Age: 57
Discharge: HOME | End: 2022-08-17
Attending: EMERGENCY MEDICINE
Payer: COMMERCIAL

## 2022-08-17 ENCOUNTER — APPOINTMENT (OUTPATIENT)
Dept: GENERAL RADIOLOGY | Age: 57
End: 2022-08-17
Payer: COMMERCIAL

## 2022-08-17 VITALS
DIASTOLIC BLOOD PRESSURE: 122 MMHG | SYSTOLIC BLOOD PRESSURE: 141 MMHG | TEMPERATURE: 98.9 F | RESPIRATION RATE: 17 BRPM | OXYGEN SATURATION: 97 % | HEART RATE: 91 BPM

## 2022-08-17 DIAGNOSIS — R07.9 CHEST PAIN, UNSPECIFIED TYPE: Primary | ICD-10-CM

## 2022-08-17 LAB
ABSOLUTE EOS #: 0.34 K/UL (ref 0–0.44)
ABSOLUTE IMMATURE GRANULOCYTE: <0.03 K/UL (ref 0–0.3)
ABSOLUTE LYMPH #: 3.45 K/UL (ref 1.1–3.7)
ABSOLUTE MONO #: 0.68 K/UL (ref 0.1–1.2)
ANION GAP SERPL CALCULATED.3IONS-SCNC: 15 MMOL/L (ref 9–17)
BASOPHILS # BLD: 1 % (ref 0–2)
BASOPHILS ABSOLUTE: 0.05 K/UL (ref 0–0.2)
BUN BLDV-MCNC: 20 MG/DL (ref 6–20)
CALCIUM SERPL-MCNC: 8.5 MG/DL (ref 8.6–10.4)
CHLORIDE BLD-SCNC: 97 MMOL/L (ref 98–107)
CO2: 23 MMOL/L (ref 20–31)
CREAT SERPL-MCNC: 1 MG/DL (ref 0.5–0.9)
EOSINOPHILS RELATIVE PERCENT: 4 % (ref 1–4)
GFR AFRICAN AMERICAN: >60 ML/MIN
GFR NON-AFRICAN AMERICAN: 58 ML/MIN
GFR SERPL CREATININE-BSD FRML MDRD: ABNORMAL ML/MIN/{1.73_M2}
GLUCOSE BLD-MCNC: 332 MG/DL (ref 70–99)
HCT VFR BLD CALC: 34.6 % (ref 36.3–47.1)
HEMOGLOBIN: 11.2 G/DL (ref 11.9–15.1)
IMMATURE GRANULOCYTES: 0 %
LYMPHOCYTES # BLD: 44 % (ref 24–43)
MAGNESIUM: 1.4 MG/DL (ref 1.6–2.6)
MCH RBC QN AUTO: 31.1 PG (ref 25.2–33.5)
MCHC RBC AUTO-ENTMCNC: 32.4 G/DL (ref 28.4–34.8)
MCV RBC AUTO: 96.1 FL (ref 82.6–102.9)
MONOCYTES # BLD: 9 % (ref 3–12)
NRBC AUTOMATED: 0 PER 100 WBC
PDW BLD-RTO: 11.9 % (ref 11.8–14.4)
PLATELET # BLD: ABNORMAL K/UL (ref 138–453)
PLATELET, FLUORESCENCE: NORMAL K/UL (ref 138–453)
POTASSIUM SERPL-SCNC: 4.3 MMOL/L (ref 3.7–5.3)
RBC # BLD: 3.6 M/UL (ref 3.95–5.11)
SEG NEUTROPHILS: 43 % (ref 36–65)
SEGMENTED NEUTROPHILS ABSOLUTE COUNT: 3.41 K/UL (ref 1.5–8.1)
SODIUM BLD-SCNC: 135 MMOL/L (ref 135–144)
TROPONIN, HIGH SENSITIVITY: 13 NG/L (ref 0–14)
TROPONIN, HIGH SENSITIVITY: 15 NG/L (ref 0–14)
WBC # BLD: 7.9 K/UL (ref 3.5–11.3)

## 2022-08-17 PROCEDURE — 84484 ASSAY OF TROPONIN QUANT: CPT

## 2022-08-17 PROCEDURE — 83735 ASSAY OF MAGNESIUM: CPT

## 2022-08-17 PROCEDURE — 6370000000 HC RX 637 (ALT 250 FOR IP): Performed by: STUDENT IN AN ORGANIZED HEALTH CARE EDUCATION/TRAINING PROGRAM

## 2022-08-17 PROCEDURE — 71045 X-RAY EXAM CHEST 1 VIEW: CPT

## 2022-08-17 PROCEDURE — 93005 ELECTROCARDIOGRAM TRACING: CPT | Performed by: STUDENT IN AN ORGANIZED HEALTH CARE EDUCATION/TRAINING PROGRAM

## 2022-08-17 PROCEDURE — 85055 RETICULATED PLATELET ASSAY: CPT

## 2022-08-17 PROCEDURE — 85025 COMPLETE CBC W/AUTO DIFF WBC: CPT

## 2022-08-17 PROCEDURE — 99285 EMERGENCY DEPT VISIT HI MDM: CPT

## 2022-08-17 PROCEDURE — 80048 BASIC METABOLIC PNL TOTAL CA: CPT

## 2022-08-17 RX ORDER — MAGNESIUM SULFATE IN WATER 40 MG/ML
2000 INJECTION, SOLUTION INTRAVENOUS ONCE
Status: DISCONTINUED | OUTPATIENT
Start: 2022-08-17 | End: 2022-08-17

## 2022-08-17 RX ORDER — 0.9 % SODIUM CHLORIDE 0.9 %
1000 INTRAVENOUS SOLUTION INTRAVENOUS ONCE
Status: DISCONTINUED | OUTPATIENT
Start: 2022-08-17 | End: 2022-08-17

## 2022-08-17 RX ORDER — MAGNESIUM HYDROXIDE/ALUMINUM HYDROXICE/SIMETHICONE 120; 1200; 1200 MG/30ML; MG/30ML; MG/30ML
30 SUSPENSION ORAL ONCE
Status: COMPLETED | OUTPATIENT
Start: 2022-08-17 | End: 2022-08-17

## 2022-08-17 RX ORDER — MAGNESIUM OXIDE 240 MG
1 POWDER IN PACKET (EA) ORAL DAILY
Qty: 14 EACH | Refills: 0 | Status: SHIPPED | OUTPATIENT
Start: 2022-08-17 | End: 2022-09-08

## 2022-08-17 RX ADMIN — ALUMINUM HYDROXIDE, MAGNESIUM HYDROXIDE, AND SIMETHICONE 30 ML: 200; 200; 20 SUSPENSION ORAL at 22:44

## 2022-08-17 ASSESSMENT — PAIN SCALES - GENERAL: PAINLEVEL_OUTOF10: 10

## 2022-08-17 ASSESSMENT — PAIN - FUNCTIONAL ASSESSMENT: PAIN_FUNCTIONAL_ASSESSMENT: 0-10

## 2022-08-17 NOTE — ED TRIAGE NOTES
Pt comes to ED via [facility]. EMS states pt had an onset of sharp chest pain, does not radiate, started an hour ago, pt was given 324 mg of aspirin. Pt is uncooperative and refuses to answer other questions.

## 2022-08-17 NOTE — ED PROVIDER NOTES
[facility]  ED  Emergency Department Encounter  Emergency Medicine Resident     Pt Name: Dontrell Cruz  MRN: [id number]  Sheagfmima 1965  Date of evaluation: 8/17/22  PCP:  Khang Strong MD    CHIEF COMPLAINT       Chief Complaint   Patient presents with    Chest Pain       HISTORY OFPRESENT ILLNESS  (Location/Symptom, Timing/Onset, Context/Setting, Quality, Duration, Modifying Factors,Severity.)      Dontrell Cruz is a 56 y.o. female with reports of chest pain. Patient is unsure of how long the chest pain has been going on although states that she noticed it today. States that she has had intermittent shortness of breath otherwise unknown to her on whether this has been there for more than 1 day. Does state that she has been coughing although has not been coughing today. Denies any headache. Abdominal pain, nausea or vomiting. Denies any difficulty urinating. Denies any trauma to the chest.  Denies taking anything for the chest pain. Patient is very hard to obtain a history from as she is noncompliant with most questions.     PAST MEDICAL / SURGICAL / SOCIAL / FAMILY HISTORY      has a past medical history of Acid reflux, Acute cystitis with hematuria, Acute non-recurrent maxillary sinusitis, Asthma, Bipolar 1 disorder ([address] Bipolar disorder, mixed ([address] BMI 34.0-34.9,adult, Cannabis use disorder, severe, dependence ([address] Cerebrovascular accident (CVA) ([address] Chest pain, Chronic renal insufficiency, Cocaine abuse ([address] COVID-19 virus RNA test result unknown, DDD (degenerative disc disease), cervical, Diabetes mellitus ([address] Dizziness, Fibromyalgia, History of stroke, Homicidal ideation, Hyperglycemia, Hypertension, Hypotension, IDDM (insulin dependent diabetes mellitus), Lupus ([address] Migraine, Neuropathy, Neuropathy, Polysubstance abuse ([address] Post traumatic stress disorder (PTSD), Posttraumatic stress disorder, Recurrent depression ([address] Recurrent major depressive disorder, in partial remission ([facility] Screening mammogram, encounter for, Severe recurrent major depression with psychotic features ([facility] Severe recurrent major depression without psychotic features ([facility] Stroke (cerebrum) ([facility] Stroke ([facility] Suicidal ideation, Suicidal intent, Vitamin D deficiency, and White matter changes. has a past surgical history that includes Abscess Drainage; chest tube insertion; Abdomen surgery; Cataract removal with implant (Bilateral); LASIK (Bilateral); Finger amputation (Left, 03/13/2021); Hand surgery (Right, 09/14/2021); Hand surgery (Right, 09/15/2021); Finger amputation (Right, 10/11/2021); Foot surgery (Right, 01/27/2022); Foot Debridement (Right, 1/27/2022); and Insert Midline Catheter (2/3/2022). Social:  reports that she has never smoked. She has never used smokeless tobacco. She reports that she does not currently use alcohol. She reports current drug use. Drugs: Marijuana (Weed) and Cocaine. Family Hx:   Family History   Problem Relation Age of Onset    Diabetes Mother     Stroke Mother     Diabetes Father     Diabetes Sister     Diabetes Brother     Other Son         GSW    No Known Problems Sister         Allergies:  Bactrim [sulfamethoxazole-trimethoprim] and Adhesive tape    Home Medications:  Prior to Admission medications    Medication Sig Start Date End Date Taking? Authorizing Provider   Magnesium Oxide (MAGNESIUM OXIDE 400) 240 MG PACK Take 1 tablet by mouth daily for 14 days 8/17/22 8/31/22 Yes Danelle Mercado DO   pregabalin (LYRICA) 200 MG capsule Take 1 capsule by mouth daily for 30 days.  4/22/22 5/22/22  Jeff Michel MD   insulin glargine (LANTUS SOLOSTAR) 100 UNIT/ML injection pen Inject 30 Units into the skin 2 times daily 4/22/22   Jeff Michel MD   atorvastatin (LIPITOR) 40 MG tablet Take 1 tablet by mouth nightly 4/22/22 8/8/22  Jeff Michel MD   acetaminophen (TYLENOL) 500 MG tablet Take 2 tablets by mouth 3 times daily as needed for Pain  Patient not taking: Reported on 8/8/2022 3/15/22   Mishel Miller MD   fluticasone (FLONASE) 50 MCG/ACT nasal spray 1 spray by Each Nostril route daily 3/15/22   Mishel Miller MD   metFORMIN (GLUCOPHAGE) 1000 MG tablet Take 1 tablet by mouth 2 times daily (with meals) 3/15/22   Mishel Miller MD   mirtazapine (REMERON) 7.5 MG tablet Take 1 tablet by mouth nightly 3/15/22   Mishel Miller MD   pantoprazole (PROTONIX) 20 MG tablet Take 1 tablet by mouth 2 times daily (before meals)  Patient not taking: Reported on 8/8/2022 3/15/22   Mishel Miller MD   traZODone (DESYREL) 150 MG tablet Take 1 tablet by mouth nightly 3/15/22   Mishel Miller MD   venlafaxine (EFFEXOR XR) 150 MG extended release capsule Take 1 capsule by mouth daily (with breakfast) 3/15/22   Mishel Miller MD   Alcohol Swabs 70 % PADS Use 1 swab 3 times daily as needed 3/15/22   Mishel Miller MD   blood glucose monitor strips Test 3 times a day & as needed for symptoms of irregular blood glucose. Dispense sufficient amount for indicated testing frequency plus additional to accommodate PRN testing needs. 3/15/22   Mishel Milelr MD   Lancets MISC 1 each by Does not apply route 3 times daily 3/15/22   Mishel Miller MD   Insulin Pen Needle ([employer] PEN NEEDLES 31G) 31G X 8 MM MISC 1 each by Does not apply route daily 3/15/22   Mishel Miller MD   FREESTYLE LITE strip 1 each by Does not apply route 3 times daily 3/15/22   Mishel Miller MD   budesonide-formoterol (SYMBICORT) 80-4.5 MCG/ACT AERO Inhale 2 puffs into the lungs 2 times daily 3/15/22   Mishel Miller MD   albuterol sulfate  (90 Base) MCG/ACT inhaler Inhale 2 puffs into the lungs every 4 hours as needed for Wheezing 3/15/22 4/15/22  Mishel Miller MD   mupirocin (BACTROBAN) 2 % ointment Apply topically 3 times daily Apply topically to right foot wound two times a day for wound care.  Apply to right foot wound, cover with Kerlix and ace wrap. Historical Provider, MD   Misc. Devices MISC 1 PAIR OF DIABETIC SHOES (1 LEFT/ 1 RIGHT)  1-3 PAIRS OF INSERTS (LEFT/ RIGHT) 2/15/22   Loye Standard, DPM   dicyclomine (BENTYL) 10 MG capsule Take 1 capsule by mouth 4 times daily 12/17/21   En Bryan MD   ibuprofen (ADVIL;MOTRIN) 800 MG tablet Take 1 tablet by mouth every 8 hours as needed for Pain 11/15/21 11/29/21  Henrique Tripp MD   FLOVENT  MCG/ACT inhaler Inhale 2 puffs into the lungs 2 times daily 10/20/20   En Bryan MD       REVIEW OFSYSTEMS    (2-9 systems for level 4, 10 or more for level 5)      Positive: Chest pain    Negative: Fevers, chills, headache, eye pain, ear pain, difficulty swallowing,  abdominal pain, nausea, vomiting, dysuria, diarrhea, constipation, numbness, tingling      PHYSICAL EXAM   (up to 7 for level 4, 8 or more forlevel 5)      INITIAL VITALS:   Vitals:    08/17/22 2016   BP:    Pulse: 91   Resp: 17   Temp:    SpO2: 97%        Physical Exam  Vitals and nursing note reviewed. Constitutional:       General: She is not in acute distress. Appearance: She is not ill-appearing, toxic-appearing or diaphoretic. HENT:      Head: Normocephalic and atraumatic. Nose: Nose normal.      Mouth/Throat:      Mouth: Mucous membranes are moist.      Pharynx: Oropharynx is clear. Eyes:      General:         Right eye: No discharge. Left eye: No discharge. Extraocular Movements: Extraocular movements intact. Pupils: Pupils are equal, round, and reactive to light. Cardiovascular:      Rate and Rhythm: Normal rate and regular rhythm. Pulses: Normal pulses. Heart sounds: Normal heart sounds. Pulmonary:      Effort: Pulmonary effort is normal. No respiratory distress. Breath sounds: Normal breath sounds. No wheezing or rales. Comments: Speaking in full sentences, no acute respiratory distress, no hoarseness. Abdominal:      General: There is no distension. Palpations: Abdomen is soft. Tenderness: There is no abdominal tenderness. Musculoskeletal:      Cervical back: Normal range of motion. No rigidity. Right lower leg: No edema. Left lower leg: No edema. Skin:     General: Skin is warm and dry. Capillary Refill: Capillary refill takes less than 2 seconds. Neurological:      General: No focal deficit present. Mental Status: She is alert and oriented to person, place, and time. Psychiatric:         Mood and Affect: Mood normal.       DIFFERENTIAL  DIAGNOSIS       Initial MDM/Plan: 56 y.o. female who presents with chest pain x's 1 day. My initial examination patient is sitting in the cot, complaining that she does not want to see the resident she \"needs somebody who knows what they are doing\". She is speaking in full sentences, no acute distress. Patient's very difficult to obtain a history from. We will plan for chest pain work-up. Vital signs notable for mild hypertension at 141/122, otherwise unremarkable. Saturating 100%, speaking full sentences, no acute respiratory distress. For cardiac work-up to include CBC, BMP, troponins x2, chest x-ray, EKG. EMERGENCY DEPARTMENT COURSE:  ED Course as of 08/17/22 2252   Wed Aug 17, 2022   2057 Magnesium(!): 1.4 [SZ]   2234 Attempted to leave [address] although when she went to triage they would not call her in medical cab home and therefore she returned to the emergency department to await for another troponin. [MA]   2234 Patient ripped out her IV prior to trying to leave [address] therefore a straight stick was obtained and magnesium not able to be replaced [MA]   2236 Will Replace magnesium orally. Written prescription for magnesium be taken at home. [MA]      ED Course User Index  [MA] Vannessa Cardona DO  [SZ] Prachi Velásquez MD      Troponins not trending up. Informed discharge.   Did recommend that patient stays although she is unwilling to stay for further cardiac work-up. We will plan for discharge with follow-up with primary care provider. DIAGNOSTIC RESULTS / EMERGENCYDEPARTMENT COURSE / MDM     LABS:  Labs Reviewed   CBC WITH AUTO DIFFERENTIAL - Abnormal; Notable for the following components:       Result Value    RBC 3.60 (*)     Hemoglobin 11.2 (*)     Hematocrit 34.6 (*)     Lymphocytes 44 (*)     All other components within normal limits   BASIC METABOLIC PANEL - Abnormal; Notable for the following components:    Glucose 332 (*)     Creatinine 1.00 (*)     Calcium 8.5 (*)     Chloride 97 (*)     GFR Non- 58 (*)     All other components within normal limits   MAGNESIUM - Abnormal; Notable for the following components:    Magnesium 1.4 (*)     All other components within normal limits   TROPONIN - Abnormal; Notable for the following components:    Troponin, High Sensitivity 15 (*)     All other components within normal limits   TROPONIN   IMMATURE PLATELET FRACTION   TROPONIN         RADIOLOGY:  XR CHEST PORTABLE    Result Date: 8/17/2022  EXAMINATION: ONE XRAY VIEW OF THE CHEST 8/17/2022 8:09 pm COMPARISON: None. HISTORY: ORDERING SYSTEM PROVIDED HISTORY: chest pain TECHNOLOGIST PROVIDED HISTORY: chest pain FINDINGS: Chest radiograph: The cardiomediastinal silhouette and hilar contours are normal. The lungs are clear with no focal consolidation, pleural effusion or pneumothorax. The overlying soft tissue and osseous structures do not demonstrate acute abnormality. No acute intrathoracic pathology. EKG  Rate normal, normal sinus rhythm, axis normal no deviation noted, intervals within normal limits including MO, QRS, QT/QTc. No ST segment elevation, no ST segment depression, nonspecific EKG    All EKG's are interpreted by the Emergency Department Physicianwho either signs or Co-signs this chart in the absence of a cardiologist.        PROCEDURES:  None    CONSULTS:  None      FINAL IMPRESSION      1.  Chest pain, unspecified type          DISPOSITION / PLAN     DISPOSITION Decision To Discharge 08/17/2022 10:33:28 PM      PATIENT REFERRED TO:  Ryann Carreno MD  [address]   [address]  [address]  [phone]    Call   for post emergency department follow up    [facility]  [address] [place] [address]  [phone]    As needed, If symptoms worsen    DISCHARGE MEDICATIONS:  Discharge Medication List as of 8/17/2022 10:34 PM          Leonides Roth DO  Emergency Medicine Resident    (Please note that portions of this note were completed with a voice recognition program.Efforts were made to edit the dictations but occasionally words are mis-transcribed.)        Leonides Roth DO  Resident  08/17/22 9000

## 2022-08-18 LAB
EKG ATRIAL RATE: 91 BPM
EKG P AXIS: 63 DEGREES
EKG P-R INTERVAL: 152 MS
EKG Q-T INTERVAL: 388 MS
EKG QRS DURATION: 90 MS
EKG QTC CALCULATION (BAZETT): 477 MS
EKG R AXIS: 10 DEGREES
EKG T AXIS: 50 DEGREES
EKG VENTRICULAR RATE: 91 BPM

## 2022-08-18 NOTE — ED NOTES
The following labs labeled with pt sticker and tubed to lab:     [] Blue     [] Lavender   [] on ice  [x] Green/yellow  [] Green/black [] on ice  [] Yellow  [] Red  [] Pink      [] COVID-19 swab    [] Rapid  [] PCR  [] Flu swab  [] Peds Viral Panel     [] Urine Sample  [] Pelvic Cultures  [] Blood Cultures            Monica Sanchez RN  08/17/22 0010

## 2022-08-18 NOTE — ED PROVIDER NOTES
[facility] ED  eMERGENCY dEPARTMENT eNCOUnter   Attending Attestation     Pt Name: Mignon Haywood  MRN: [id number]  [place] 1965  Date of evaluation: 8/17/22       Mignon Haywood is a 56 y.o. female who presents with Chest Pain      History: Pt presents with chest pain. Pt states it is on the right side but cannot answer any questions about the chest pain other than that. Exam: HRRR, lungs CTABL, abdomen soft and non tender. Pt has tenderness over the right chest.     Plan for ACS screening. If negative will discharge given reproducible right sided  chest pain. EKG shows normal sinus rhythm rate of 91 beats minute. Normal axis. No ST elevation depression. T waves are upright. No blocks or arrhythmias. Nonspecific EKG. I performed a history and physical examination of the patient and discussed management with the resident. I reviewed the residents note and agree with the documented findings and plan of care. Any areas of disagreement are noted on the chart. I was personally present for the key portions of any procedures. I have documented in the chart those procedures where I was not present during the key portions. I have personally reviewed all images and agree with the resident's interpretation. I have reviewed the emergency nurses triage note. I agree with the chief complaint, past medical history, past surgical history, allergies, medications, social and family history as documented unless otherwise noted below. Documentation of the HPI, Physical Exam and Medical Decision Making performed by medical students or scribes is based on my personal performance of the HPI, PE and MDM.  For Phys Assistant/ Nurse Practitioner cases/documentation I have had a face to face evaluation of this patient and have completed at least one if not all key elements of the E/M (history, physical exam, and MDM). Additional findings are as noted. For APC cases I have personally evaluated and examined the patient in conjunction with the APC and agree with the treatment plan and disposition of the patient as recorded by the APC.     Ana Troncoso MD  Attending Emergency  Physician       Singh Ace MD  08/17/22 1233

## 2022-08-18 NOTE — DISCHARGE INSTRUCTIONS
Dinesh Headley!!! On behalf of the Emergency Department staff at [facility]. [facility] Emergency Department, I would like to thank you for giving Anne Stein the opportunity to address your health care needs and concerns. We hope that during your visit, our service was delivered in a professional and caring manner. Please keep [address] [place] [address] Emil in mind as we walk with you down the path to your own personal wellness. Please expect an automated phone call from [phone] so we can ask a few questions about your health and progress. Based on your answers, a clinician may call you back to offer help and instructions. If you notice any concerning symptoms please return to the ER immediately. These can include but are not limited to: fevers, chills, shortness of breath, vomiting, weakness of the extremities, changes in your mental status, numbness, pale extremities, or chest pain.

## 2022-08-18 NOTE — ED NOTES
[facility]   TRIP#: [id number]       PATRICIA Esquivel  08/17/22 5802

## 2022-08-18 NOTE — ED NOTES
Pt states wants to leave and removed her IV minutes earlier. Pt explained the risks of leaving by Dr Eleazar Meade. Pt insists in leaving and understands the consequences.       Mel Naranjo RN  08/17/22 7313

## 2022-08-18 NOTE — ED NOTES
The following labs labeled with pt sticker and tubed to lab:     [x] Blue     [x] Lavender   [] on ice  [x] Green/yellow  [x] Green/black [] on ice  [] Yellow  [] Red  [] Pink      [] COVID-19 swab    [] Rapid  [] PCR  [] Flu swab  [] Peds Viral Panel     [] Urine Sample  [] Pelvic Cultures  [] Blood Cultures            Mel Naranjo RN  08/17/22 2001

## 2022-09-08 ENCOUNTER — APPOINTMENT (OUTPATIENT)
Dept: CT IMAGING | Age: 57
DRG: 420 | End: 2022-09-08
Payer: COMMERCIAL

## 2022-09-08 ENCOUNTER — APPOINTMENT (OUTPATIENT)
Dept: GENERAL RADIOLOGY | Age: 57
DRG: 420 | End: 2022-09-08
Payer: COMMERCIAL

## 2022-09-08 ENCOUNTER — HOSPITAL ENCOUNTER (INPATIENT)
Age: 57
LOS: 14 days | Discharge: PSYCHIATRIC OR SUBSTANCE USE FACILITY | DRG: 420 | End: 2022-09-22
Attending: EMERGENCY MEDICINE | Admitting: INTERNAL MEDICINE
Payer: COMMERCIAL

## 2022-09-08 ENCOUNTER — APPOINTMENT (OUTPATIENT)
Dept: ULTRASOUND IMAGING | Age: 57
DRG: 420 | End: 2022-09-08
Payer: COMMERCIAL

## 2022-09-08 DIAGNOSIS — D64.9 ANEMIA, UNSPECIFIED TYPE: ICD-10-CM

## 2022-09-08 DIAGNOSIS — E10.11 DIABETIC KETOACIDOSIS WITH COMA ASSOCIATED WITH TYPE 1 DIABETES MELLITUS (HCC): Primary | ICD-10-CM

## 2022-09-08 DIAGNOSIS — N17.9 ACUTE RENAL FAILURE, UNSPECIFIED ACUTE RENAL FAILURE TYPE (HCC): ICD-10-CM

## 2022-09-08 PROBLEM — E10.10 DKA, TYPE 1, NOT AT GOAL (HCC): Status: ACTIVE | Noted: 2022-09-08

## 2022-09-08 PROBLEM — E11.10 DKA, TYPE 2, NOT AT GOAL (HCC): Status: ACTIVE | Noted: 2022-09-08

## 2022-09-08 LAB
ABSOLUTE EOS #: 0 K/UL (ref 0–0.4)
ABSOLUTE LYMPH #: 0.7 K/UL (ref 1–4.8)
ABSOLUTE MONO #: 0.9 K/UL (ref 0.1–1.3)
ACETAMINOPHEN LEVEL: <5 UG/ML (ref 10–30)
ALBUMIN SERPL-MCNC: 4 G/DL (ref 3.5–5.2)
ALLEN TEST: ABNORMAL
ALP BLD-CCNC: 151 U/L (ref 35–104)
ALT SERPL-CCNC: 15 U/L (ref 5–33)
AMMONIA: 12 UMOL/L (ref 11–51)
AMPHETAMINE SCREEN URINE: NEGATIVE
ANION GAP SERPL CALCULATED.3IONS-SCNC: 14 MMOL/L (ref 9–17)
ANION GAP SERPL CALCULATED.3IONS-SCNC: 21 MMOL/L (ref 9–17)
ANION GAP SERPL CALCULATED.3IONS-SCNC: 25 MMOL/L (ref 9–17)
AST SERPL-CCNC: 11 U/L
BARBITURATE SCREEN URINE: NEGATIVE
BASOPHILS # BLD: 0 % (ref 0–2)
BASOPHILS ABSOLUTE: 0 K/UL (ref 0–0.2)
BENZODIAZEPINE SCREEN, URINE: NEGATIVE
BETA-HYDROXYBUTYRATE: 5.47 MMOL/L (ref 0.02–0.27)
BILIRUB SERPL-MCNC: 0.4 MG/DL (ref 0.3–1.2)
BILIRUBIN URINE: ABNORMAL
BUN BLDV-MCNC: 117 MG/DL (ref 6–20)
BUN BLDV-MCNC: 124 MG/DL (ref 6–20)
BUN BLDV-MCNC: 129 MG/DL (ref 6–20)
CALCIUM SERPL-MCNC: 10.3 MG/DL (ref 8.6–10.4)
CALCIUM SERPL-MCNC: 8.6 MG/DL (ref 8.6–10.4)
CALCIUM SERPL-MCNC: 9.1 MG/DL (ref 8.6–10.4)
CANNABINOID SCREEN URINE: NEGATIVE
CARBOXYHEMOGLOBIN: 1.8 % (ref 0–5)
CASTS UA: NORMAL /LPF
CASTS UA: NORMAL /LPF
CHLORIDE BLD-SCNC: 117 MMOL/L (ref 98–107)
CHLORIDE BLD-SCNC: 126 MMOL/L (ref 98–107)
CHLORIDE BLD-SCNC: 129 MMOL/L (ref 98–107)
CHLORIDE, UR: 20 MMOL/L
CO2: 17 MMOL/L (ref 20–31)
CO2: 19 MMOL/L (ref 20–31)
CO2: 21 MMOL/L (ref 20–31)
COCAINE METABOLITE, URINE: POSITIVE
COLOR: YELLOW
CREAT SERPL-MCNC: 3.14 MG/DL (ref 0.5–0.9)
CREAT SERPL-MCNC: 3.33 MG/DL (ref 0.5–0.9)
CREAT SERPL-MCNC: 3.56 MG/DL (ref 0.5–0.9)
CREATININE URINE: 121 MG/DL (ref 28–217)
EOSINOPHIL,URINE: NORMAL
EOSINOPHILS RELATIVE PERCENT: 0 % (ref 0–4)
EPITHELIAL CELLS UA: NORMAL /HPF
ETHANOL PERCENT: <0.01 %
ETHANOL: <10 MG/DL
FENTANYL URINE: POSITIVE
FREE KAPPA/LAMBDA RATIO: 2.74 (ref 0.26–1.65)
GFR AFRICAN AMERICAN: 16 ML/MIN
GFR AFRICAN AMERICAN: 17 ML/MIN
GFR AFRICAN AMERICAN: 19 ML/MIN
GFR NON-AFRICAN AMERICAN: 13 ML/MIN
GFR NON-AFRICAN AMERICAN: 14 ML/MIN
GFR NON-AFRICAN AMERICAN: 15 ML/MIN
GFR SERPL CREATININE-BSD FRML MDRD: ABNORMAL ML/MIN/{1.73_M2}
GLUCOSE BLD-MCNC: 1034 MG/DL (ref 70–99)
GLUCOSE BLD-MCNC: 1036 MG/DL (ref 70–99)
GLUCOSE BLD-MCNC: 804 MG/DL (ref 70–99)
GLUCOSE BLD-MCNC: 821 MG/DL (ref 70–99)
GLUCOSE BLD-MCNC: 946 MG/DL (ref 70–99)
GLUCOSE BLD-MCNC: 953 MG/DL (ref 70–99)
GLUCOSE URINE: ABNORMAL
HCO3 VENOUS: 18.5 MMOL/L (ref 24–30)
HCT VFR BLD CALC: 46.7 % (ref 36–46)
HEMOGLOBIN: 14.4 G/DL (ref 12–16)
KAPPA FREE LIGHT CHAINS QNT: 12.19 MG/DL (ref 0.37–1.94)
KETONES, URINE: ABNORMAL
LAMBDA FREE LIGHT CHAINS QNT: 4.45 MG/DL (ref 0.57–2.63)
LEUKOCYTE ESTERASE, URINE: NEGATIVE
LYMPHOCYTES # BLD: 6 % (ref 24–44)
MAGNESIUM: 2.7 MG/DL (ref 1.6–2.6)
MAGNESIUM: 3 MG/DL (ref 1.6–2.6)
MAGNESIUM: 3.3 MG/DL (ref 1.6–2.6)
MCH RBC QN AUTO: 31.6 PG (ref 26–34)
MCHC RBC AUTO-ENTMCNC: 30.8 G/DL (ref 31–37)
MCV RBC AUTO: 102.8 FL (ref 80–100)
METHADONE SCREEN, URINE: NEGATIVE
METHEMOGLOBIN: 0.7 % (ref 0–1.9)
MONOCYTES # BLD: 8 % (ref 1–7)
NEGATIVE BASE EXCESS, VEN: 9.6 MMOL/L (ref 0–2)
NITRITE, URINE: NEGATIVE
O2 SAT, VEN: 46.7 % (ref 60–85)
OPIATES, URINE: NEGATIVE
OXYCODONE SCREEN URINE: NEGATIVE
PATIENT TEMP: 37
PCO2, VEN: 48.1 (ref 39–55)
PDW BLD-RTO: 14.3 % (ref 11.5–14.9)
PH UA: 5.5 (ref 5–8)
PH VENOUS: 7.19 (ref 7.32–7.42)
PHENCYCLIDINE, URINE: NEGATIVE
PHOSPHORUS: 4.1 MG/DL (ref 2.6–4.5)
PHOSPHORUS: 5.4 MG/DL (ref 2.6–4.5)
PLATELET # BLD: 399 K/UL (ref 150–450)
PMV BLD AUTO: 10.1 FL (ref 6–12)
PO2, VEN: 32.6 (ref 30–50)
POTASSIUM SERPL-SCNC: 4.2 MMOL/L (ref 3.7–5.3)
POTASSIUM SERPL-SCNC: 4.4 MMOL/L (ref 3.7–5.3)
POTASSIUM SERPL-SCNC: 5.5 MMOL/L (ref 3.7–5.3)
PROTEIN UA: NEGATIVE
RBC # BLD: 4.54 M/UL (ref 4–5.2)
RBC UA: NORMAL /HPF
SALICYLATE LEVEL: <1 MG/DL (ref 3–10)
SAMPLE SITE: ABNORMAL
SEG NEUTROPHILS: 86 % (ref 36–66)
SEGMENTED NEUTROPHILS ABSOLUTE COUNT: 9.3 K/UL (ref 1.3–9.1)
SODIUM BLD-SCNC: 161 MMOL/L (ref 135–144)
SODIUM BLD-SCNC: 164 MMOL/L (ref 135–144)
SODIUM BLD-SCNC: 164 MMOL/L (ref 135–144)
SODIUM,UR: <20 MMOL/L
SPECIFIC GRAVITY UA: 1.01 (ref 1–1.03)
TEST INFORMATION: ABNORMAL
TOTAL CK: 203 U/L (ref 26–192)
TOTAL PROTEIN: 8.9 G/DL (ref 6.4–8.3)
TOXIC TRICYCLIC SC,BLOOD: ABNORMAL
TURBIDITY: CLEAR
URIC ACID: 16.2 MG/DL (ref 2.4–5.7)
URINE HGB: NEGATIVE
UROBILINOGEN, URINE: NORMAL
WBC # BLD: 10.9 K/UL (ref 3.5–11)
WBC UA: NORMAL /HPF

## 2022-09-08 PROCEDURE — 99285 EMERGENCY DEPT VISIT HI MDM: CPT

## 2022-09-08 PROCEDURE — 86235 NUCLEAR ANTIGEN ANTIBODY: CPT

## 2022-09-08 PROCEDURE — 87205 SMEAR GRAM STAIN: CPT

## 2022-09-08 PROCEDURE — 6360000002 HC RX W HCPCS: Performed by: STUDENT IN AN ORGANIZED HEALTH CARE EDUCATION/TRAINING PROGRAM

## 2022-09-08 PROCEDURE — 80143 DRUG ASSAY ACETAMINOPHEN: CPT

## 2022-09-08 PROCEDURE — 82805 BLOOD GASES W/O2 SATURATION: CPT

## 2022-09-08 PROCEDURE — 6370000000 HC RX 637 (ALT 250 FOR IP): Performed by: STUDENT IN AN ORGANIZED HEALTH CARE EDUCATION/TRAINING PROGRAM

## 2022-09-08 PROCEDURE — 81001 URINALYSIS AUTO W/SCOPE: CPT

## 2022-09-08 PROCEDURE — 86038 ANTINUCLEAR ANTIBODIES: CPT

## 2022-09-08 PROCEDURE — 85025 COMPLETE CBC W/AUTO DIFF WBC: CPT

## 2022-09-08 PROCEDURE — 82436 ASSAY OF URINE CHLORIDE: CPT

## 2022-09-08 PROCEDURE — 76770 US EXAM ABDO BACK WALL COMP: CPT

## 2022-09-08 PROCEDURE — 80179 DRUG ASSAY SALICYLATE: CPT

## 2022-09-08 PROCEDURE — 82947 ASSAY GLUCOSE BLOOD QUANT: CPT

## 2022-09-08 PROCEDURE — 82010 KETONE BODYS QUAN: CPT

## 2022-09-08 PROCEDURE — 82140 ASSAY OF AMMONIA: CPT

## 2022-09-08 PROCEDURE — 84156 ASSAY OF PROTEIN URINE: CPT

## 2022-09-08 PROCEDURE — 83883 ASSAY NEPHELOMETRY NOT SPEC: CPT

## 2022-09-08 PROCEDURE — 82800 BLOOD PH: CPT

## 2022-09-08 PROCEDURE — 80053 COMPREHEN METABOLIC PANEL: CPT

## 2022-09-08 PROCEDURE — 99291 CRITICAL CARE FIRST HOUR: CPT | Performed by: INTERNAL MEDICINE

## 2022-09-08 PROCEDURE — G0480 DRUG TEST DEF 1-7 CLASSES: HCPCS

## 2022-09-08 PROCEDURE — 84300 ASSAY OF URINE SODIUM: CPT

## 2022-09-08 PROCEDURE — 51702 INSERT TEMP BLADDER CATH: CPT

## 2022-09-08 PROCEDURE — 84100 ASSAY OF PHOSPHORUS: CPT

## 2022-09-08 PROCEDURE — 02HV33Z INSERTION OF INFUSION DEVICE INTO SUPERIOR VENA CAVA, PERCUTANEOUS APPROACH: ICD-10-PCS | Performed by: SURGERY

## 2022-09-08 PROCEDURE — 82550 ASSAY OF CK (CPK): CPT

## 2022-09-08 PROCEDURE — 86225 DNA ANTIBODY NATIVE: CPT

## 2022-09-08 PROCEDURE — 84550 ASSAY OF BLOOD/URIC ACID: CPT

## 2022-09-08 PROCEDURE — 2580000003 HC RX 258: Performed by: EMERGENCY MEDICINE

## 2022-09-08 PROCEDURE — 96365 THER/PROPH/DIAG IV INF INIT: CPT

## 2022-09-08 PROCEDURE — 80048 BASIC METABOLIC PNL TOTAL CA: CPT

## 2022-09-08 PROCEDURE — 83735 ASSAY OF MAGNESIUM: CPT

## 2022-09-08 PROCEDURE — 70450 CT HEAD/BRAIN W/O DYE: CPT

## 2022-09-08 PROCEDURE — 82570 ASSAY OF URINE CREATININE: CPT

## 2022-09-08 PROCEDURE — 80307 DRUG TEST PRSMV CHEM ANLYZR: CPT

## 2022-09-08 PROCEDURE — 2000000000 HC ICU R&B

## 2022-09-08 PROCEDURE — 36556 INSERT NON-TUNNEL CV CATH: CPT

## 2022-09-08 PROCEDURE — 71045 X-RAY EXAM CHEST 1 VIEW: CPT

## 2022-09-08 PROCEDURE — 6370000000 HC RX 637 (ALT 250 FOR IP): Performed by: EMERGENCY MEDICINE

## 2022-09-08 PROCEDURE — 2580000003 HC RX 258: Performed by: STUDENT IN AN ORGANIZED HEALTH CARE EDUCATION/TRAINING PROGRAM

## 2022-09-08 PROCEDURE — 36415 COLL VENOUS BLD VENIPUNCTURE: CPT

## 2022-09-08 RX ORDER — VENLAFAXINE HYDROCHLORIDE 150 MG/1
150 CAPSULE, EXTENDED RELEASE ORAL
Status: DISCONTINUED | OUTPATIENT
Start: 2022-09-09 | End: 2022-09-22 | Stop reason: HOSPADM

## 2022-09-08 RX ORDER — 0.9 % SODIUM CHLORIDE 0.9 %
15 INTRAVENOUS SOLUTION INTRAVENOUS ONCE
Status: DISCONTINUED | OUTPATIENT
Start: 2022-09-08 | End: 2022-09-09

## 2022-09-08 RX ORDER — MIRTAZAPINE 15 MG/1
7.5 TABLET, FILM COATED ORAL NIGHTLY
Status: DISCONTINUED | OUTPATIENT
Start: 2022-09-08 | End: 2022-09-22 | Stop reason: HOSPADM

## 2022-09-08 RX ORDER — INSULIN GLARGINE 100 [IU]/ML
30 INJECTION, SOLUTION SUBCUTANEOUS 2 TIMES DAILY
Status: DISCONTINUED | OUTPATIENT
Start: 2022-09-08 | End: 2022-09-13

## 2022-09-08 RX ORDER — ATORVASTATIN CALCIUM 40 MG/1
40 TABLET, FILM COATED ORAL NIGHTLY
Status: DISCONTINUED | OUTPATIENT
Start: 2022-09-08 | End: 2022-09-22 | Stop reason: HOSPADM

## 2022-09-08 RX ORDER — HALOPERIDOL 5 MG/ML
5 INJECTION INTRAMUSCULAR EVERY 6 HOURS PRN
Status: DISCONTINUED | OUTPATIENT
Start: 2022-09-08 | End: 2022-09-13

## 2022-09-08 RX ORDER — PANTOPRAZOLE SODIUM 40 MG/1
40 TABLET, DELAYED RELEASE ORAL
Status: DISCONTINUED | OUTPATIENT
Start: 2022-09-09 | End: 2022-09-09

## 2022-09-08 RX ORDER — MAGNESIUM SULFATE 1 G/100ML
1000 INJECTION INTRAVENOUS PRN
Status: DISCONTINUED | OUTPATIENT
Start: 2022-09-08 | End: 2022-09-08

## 2022-09-08 RX ORDER — SODIUM CHLORIDE 9 MG/ML
INJECTION, SOLUTION INTRAVENOUS CONTINUOUS
Status: DISCONTINUED | OUTPATIENT
Start: 2022-09-08 | End: 2022-09-09

## 2022-09-08 RX ORDER — DEXTROSE MONOHYDRATE 100 MG/ML
INJECTION, SOLUTION INTRAVENOUS CONTINUOUS PRN
Status: DISCONTINUED | OUTPATIENT
Start: 2022-09-08 | End: 2022-09-20 | Stop reason: SDUPTHER

## 2022-09-08 RX ORDER — POTASSIUM CHLORIDE 7.45 MG/ML
10 INJECTION INTRAVENOUS
Status: COMPLETED | OUTPATIENT
Start: 2022-09-09 | End: 2022-09-09

## 2022-09-08 RX ORDER — ALBUTEROL SULFATE 90 UG/1
2 AEROSOL, METERED RESPIRATORY (INHALATION) EVERY 4 HOURS PRN
Status: DISCONTINUED | OUTPATIENT
Start: 2022-09-08 | End: 2022-09-22 | Stop reason: HOSPADM

## 2022-09-08 RX ORDER — FLUTICASONE PROPIONATE 50 MCG
1 SPRAY, SUSPENSION (ML) NASAL DAILY
Status: DISCONTINUED | OUTPATIENT
Start: 2022-09-08 | End: 2022-09-22 | Stop reason: HOSPADM

## 2022-09-08 RX ORDER — POTASSIUM CHLORIDE 7.45 MG/ML
10 INJECTION INTRAVENOUS PRN
Status: DISCONTINUED | OUTPATIENT
Start: 2022-09-08 | End: 2022-09-08

## 2022-09-08 RX ORDER — DEXTROSE AND SODIUM CHLORIDE 5; .45 G/100ML; G/100ML
INJECTION, SOLUTION INTRAVENOUS CONTINUOUS PRN
Status: DISCONTINUED | OUTPATIENT
Start: 2022-09-08 | End: 2022-09-09

## 2022-09-08 RX ORDER — 0.9 % SODIUM CHLORIDE 0.9 %
2000 INTRAVENOUS SOLUTION INTRAVENOUS ONCE
Status: COMPLETED | OUTPATIENT
Start: 2022-09-08 | End: 2022-09-08

## 2022-09-08 RX ORDER — POLYETHYLENE GLYCOL 3350 17 G/17G
17 POWDER, FOR SOLUTION ORAL DAILY PRN
Status: DISCONTINUED | OUTPATIENT
Start: 2022-09-08 | End: 2022-09-22 | Stop reason: HOSPADM

## 2022-09-08 RX ORDER — BUDESONIDE AND FORMOTEROL FUMARATE DIHYDRATE 80; 4.5 UG/1; UG/1
2 AEROSOL RESPIRATORY (INHALATION) 2 TIMES DAILY
Status: DISCONTINUED | OUTPATIENT
Start: 2022-09-08 | End: 2022-09-22 | Stop reason: HOSPADM

## 2022-09-08 RX ORDER — ENOXAPARIN SODIUM 100 MG/ML
30 INJECTION SUBCUTANEOUS DAILY
Status: DISCONTINUED | OUTPATIENT
Start: 2022-09-08 | End: 2022-09-08

## 2022-09-08 RX ORDER — FLUTICASONE PROPIONATE 110 UG/1
2 AEROSOL, METERED RESPIRATORY (INHALATION) 2 TIMES DAILY
Status: DISCONTINUED | OUTPATIENT
Start: 2022-09-08 | End: 2022-09-08

## 2022-09-08 RX ORDER — NOREPINEPHRINE BIT/0.9 % NACL 16MG/250ML
1-100 INFUSION BOTTLE (ML) INTRAVENOUS CONTINUOUS
Status: DISCONTINUED | OUTPATIENT
Start: 2022-09-08 | End: 2022-09-10

## 2022-09-08 RX ORDER — HEPARIN SODIUM 5000 [USP'U]/ML
5000 INJECTION, SOLUTION INTRAVENOUS; SUBCUTANEOUS EVERY 12 HOURS
Status: DISCONTINUED | OUTPATIENT
Start: 2022-09-08 | End: 2022-09-09

## 2022-09-08 RX ADMIN — SODIUM CHLORIDE: 9 INJECTION, SOLUTION INTRAVENOUS at 21:29

## 2022-09-08 RX ADMIN — FLUTICASONE PROPIONATE 1 SPRAY: 50 SPRAY, METERED NASAL at 17:25

## 2022-09-08 RX ADMIN — ENOXAPARIN SODIUM 30 MG: 100 INJECTION SUBCUTANEOUS at 17:25

## 2022-09-08 RX ADMIN — SODIUM CHLORIDE: 9 INJECTION, SOLUTION INTRAVENOUS at 20:14

## 2022-09-08 RX ADMIN — SODIUM CHLORIDE 0.01 UNITS/KG/HR: 9 INJECTION, SOLUTION INTRAVENOUS at 13:05

## 2022-09-08 RX ADMIN — SODIUM CHLORIDE 1000 ML: 9 INJECTION, SOLUTION INTRAVENOUS at 12:08

## 2022-09-08 RX ADMIN — HALOPERIDOL LACTATE 5 MG: 5 INJECTION, SOLUTION INTRAMUSCULAR at 14:43

## 2022-09-08 ASSESSMENT — PAIN DESCRIPTION - PAIN TYPE: TYPE: OTHER (COMMENT)

## 2022-09-08 ASSESSMENT — PAIN SCALES - GENERAL: PAINLEVEL_OUTOF10: 10

## 2022-09-08 ASSESSMENT — PAIN DESCRIPTION - LOCATION: LOCATION: ABDOMEN

## 2022-09-08 ASSESSMENT — PAIN DESCRIPTION - DESCRIPTORS: DESCRIPTORS: PATIENT UNABLE TO DESCRIBE

## 2022-09-08 NOTE — ED NOTES
Currently infusing 2nd liter bolus of saline from original 2 liter order.      Stephanie Ingram RN  09/08/22 96

## 2022-09-08 NOTE — ED NOTES
Patient used her teeth to rip out wrist IV. Attempted to draw labs off existing IV, unable to draw labs. Another RN in to attempt for second IV line and labs. Vandana Rea RN  09/08/22 Veronica [id number], RN  09/08/22 8845

## 2022-09-08 NOTE — PROGRESS NOTES
Medication History completed:    New medications: none    Medications discontinued: Flovent, ibuprofen, magnesium oxide, pantoprazole, pregabalin    Changes to dosing: none    Stated allergies: As listed    Other pertinent information: Medications confirmed with Nilam Herrera.      Thank you,  Kristy Verdugo, PharmD, BCPS  [phone]

## 2022-09-08 NOTE — PROGRESS NOTES
Call out to nephro to discuss lab values    Latest Reference Range & Units 9/8/22 17:17   Sodium 135 - 144 mmol/L 164 (HH)   (HH): Data is critically high

## 2022-09-08 NOTE — PROGRESS NOTES
Writer spoke with  concerning sodium results, MD advised to call him back when glucose less than 500.

## 2022-09-08 NOTE — ED NOTES
Dr. Zeb Garcia was at bedside with patient. US of Kidneys done.       Karl Sanchez RN  09/08/22 8351

## 2022-09-08 NOTE — ED NOTES
Blood sugar machine said HI on it that the blood sugar is over 600. VINOD SHARP notified.      Jordyn Camarillo  09/08/22 1104
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
## 2022-09-08 NOTE — ED NOTES
POCT BSG reading of high (>600). IV glucose obtained for level to adjust insulin infusion.       Harleen Tse RN  09/08/22 [address] Mitra Lozada RN  09/08/22 7671

## 2022-09-08 NOTE — PROGRESS NOTES
Spoke with Dr. Melonie Eaton to consult Dr. Hong Maxwell for central line for levophed. Also, patient retaining urine so okay to place cullen catheter at this time.

## 2022-09-08 NOTE — ED NOTES
Report given to Moustapha Esquivel RN from ICU. Report method in person   The following was reviewed with receiving RN:   Current vital signs:  /80   Pulse (!) 104   Resp 17   Wt 210 lb (95.3 kg)   LMP  (LMP Unknown)   SpO2 96%   BMI 34.95 kg/m²                      Any medication or safety alerts were reviewed. Any pending diagnostics and notifications were also reviewed, as well as any safety concerns or issues, abnormal labs, abnormal imaging, and abnormal assessment findings. Questions were answered.             Sher Groves RN  09/08/22 [id number]
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
## 2022-09-08 NOTE — CONSULTS
Department of Internal Medicine  Nephrology Bere Lott MD   Consult Note    Reason for consultation: Management of acute kidney injury. Consulting physician: Nilton Duval MD.    History of present illness: This is a 56 y.o. female with a significant past medical history of Severe bipolar disorder, type 2 diabetes mellitus [insulin requiring], cannabis and cocaine abuse, systemic hypertensionFibromyalgia and s/p prior cerebrovascular accident, who was found unresponsive at home where she lives alone. She had apparently not been taking her insulin as her refrigerator was filled with unused insulin vials. Place was activated after family had not heard from patient for several days and when EMS arrived, patient was pretty confused and obtunded with severe hypotension and BP could not be obtained initially. Blood glucose was greater than 1000 mg/dL. Blood pressure in the emergency department was 86/64 mmHg with pulse 100 and respiratory rate 24/min. Laboratory studies were remarkable for hemoconcentration, serum potassium 5.5 mmol/L, blood glucose 1036 mg/dL, serum bicarbonate 19 mmol/L [anion gap 25 mmol/L] and elevated BUN/creatinine 129/3.56 mg/dL and hence nephrology consultation. At the time of this encounter, patient remained confused and appears quite dry and delirious to occasionally lucid.     Bactrim [sulfamethoxazole-trimethoprim] and Adhesive tape    Past Medical History:   Diagnosis Date    Acid reflux 5/29/2017    Acute cystitis with hematuria 10/11/2016    Acute non-recurrent maxillary sinusitis 11/28/2017    Asthma     Bipolar 1 disorder ([address] 1/4/2018    Bipolar disorder, mixed ([address]     BMI 34.0-34.9,adult 11/28/2017    Cannabis use disorder, severe, dependence ([address] 12/19/2017    Cerebrovascular accident (CVA) ([address] 6/14/2017    Chest pain 11/5/2016    Chronic renal insufficiency     Cocaine abuse ([address] 1/5/2018    COVID-19 virus RNA test result unknown     DDD (degenerative disc disease), cervical     Diabetes mellitus ([address]     Dizziness 6/13/2017    Fibromyalgia     History of stroke 9/9/2017    Homicidal ideation 11/6/2017    Hyperglycemia     Hypertension     Hypotension 1/18/2019    IDDM (insulin dependent diabetes mellitus) 12/21/2015    Lupus ([address]     Migraine     Neuropathy     Neuropathy     Polysubstance abuse ([address] 9/17/2017    Post traumatic stress disorder (PTSD)     Posttraumatic stress disorder 5/29/2017    Recurrent depression ([address] 5/29/2017    Recurrent major depressive disorder, in partial remission ([address] 11/28/2017    Screening mammogram, encounter for 11/28/2017    Severe recurrent major depression with psychotic features ([address] 12/19/2017    Severe recurrent major depression without psychotic features ([address] 12/19/2017    Stroke (cerebrum) ([address] 6/14/2017    St[address]     per chart notes    Suicidal ideation     Suicidal intent 3/10/2017    Vitamin D deficiency 11/28/2017    White matter changes 6/13/2017     Scheduled Meds:  Continuous Infusions:   dextrose      insulin       PRN Meds:.glucose, dextrose bolus **OR** dextrose bolus, glucagon (rDNA), dextrose    Family History   Problem Relation Age of Onset    Diabetes Mother     Stroke Mother     Diabetes Father     Diabetes Sister     Diabetes Brother     Other Son         GSW    No Known Problems Sister       Social History     Socioeconomic History    Marital status: Legally      Spouse name: None    Number of children: None    Years of education: None    Highest education level: None   Tobacco Use    Smoking status: Never    Smokeless tobacco: Never   Vaping Use    Vaping Use: Never used   Substance and Sexual Activity    Alcohol use: Not Currently    Drug use: Yes     Types: Marijuana ([employer]), Cocaine     Comment: + Cocaine 2/2021 also, see Care Everywhere UDS    Sexual activity: Not Currently     Partners: Male     Social Determinants of Health     Social Connections: Unknown    Frequency of Communication with Friends and Family: Once a week    Active Member of Clubs or Organizations: No    Attends Club or Organization Meetings: Never    Marital Status:      Review of systems: Unable due to altered mental status    Physical Exam:    VITALS:  /80   Pulse (!) 104   Resp 17   Wt 210 lb (95.3 kg)   LMP  (LMP Unknown)   SpO2 96%   BMI 34.95 kg/m²   TEMPERATURE:  Current -  ; Max - No data recorded  RESPIRATIONS RANGE: Resp  Av.8  Min: 17  Max: 26  PULSE RANGE: Pulse  Av.3  Min: 100  Max: 109  BLOOD PRESSURE RANGE:  Systolic (94PPR), DBF:126 , Min:86 , EZI:139  ; Diastolic (63JVX), GUP:57, Min:64, Max:80   PULSE OXIMETRY RANGE: SpO2  Av %  Min: 96 %  Max: 96 %  24HR INTAKE/OUTPUT:  No intake or output data in the 24 hours ending 22 1615    Constitutional: delirious, distracted, flushed, and toxic    Skin: Skin color, texture, turgor normal. No rashes or lesions    Head: Normocephalic, without obvious abnormality, atraumatic     Cardiovascular/Edema: regular rate and rhythm, S1, S2 normal, no murmur, click, rub or gallop    Respiratory: Lungs: clear to auscultation bilaterally    Abdomen: soft, non-tender; bowel sounds normal; no masses,  no organomegaly    Back: symmetric, no curvature. ROM normal. No CVA tenderness. Extremities: extremities normal, atraumatic, no cyanosis or edema;  Amputation of the left fourth and fifth toe as well as fourth metatarsal.    Neuro:  Grossly normal    CBC:   Recent Labs     22  1124   WBC 10.9   HGB 14.4        BMP:    Recent Labs     22  1124   *   K 5.5*   *   CO2 19*   *   CREATININE 3.56*   GLUCOSE 1,036*     Lab Results   Component Value Date/Time    NITRU NEGATIVE 2022 02:30 PM    COLORU Yellow 2022 02:30 PM    PHUR 5.5 2022 02:30 PM    WBCUA 5 TO 10 2022 02:30 PM    RBCUA 0 TO 2 2022 02:30 PM    MUCUS NOT REPORTED 2022 12:04 PM    TRICHOMONAS NOT REPORTED 01/24/2022 12:04 PM    YEAST FEW 08/04/2022 02:30 PM    BACTERIA FEW 08/04/2022 02:30 PM    CLARITYU clear 07/12/2021 09:57 AM    SPECGRAV 1.062 08/04/2022 02:30 PM    LEUKOCYTESUR NEGATIVE 08/04/2022 02:30 PM    UROBILINOGEN Normal 08/04/2022 02:30 PM    BILIRUBINUR NEGATIVE 08/04/2022 02:30 PM    BILIRUBINUR negative 07/12/2021 09:57 AM    BLOODU negative 07/12/2021 09:57 AM    GLUCOSEU 3+ 08/04/2022 02:30 PM    KETUA NEGATIVE 08/04/2022 02:30 PM    AMORPHOUS NOT REPORTED 01/24/2022 12:04 PM     Urine Sodium:     Lab Results   Component Value Date/Time    EMILIO 36 02/07/2021 05:18 AM     Urine Protein:     Lab Results   Component Value Date/Time    TPU 13 07/08/2020 11:19 AM     Urine Creatinine:     Lab Results   Component Value Date/Time    LABCREA 43.0 08/18/2021 03:19 PM    LABCREA 46.0 02/07/2021 05:18 AM     IMPRESSION/RECOMMENDATIONS:      1. Acute kidney injury - most consistent with prerenal azotemia secondary to dehydration from osmotic diuresis as well as ischemic acute tubular necrosis secondary to hypotension. Plan: IV fluid 0.9 normal saline at 100 ml/hr until hemodynamically stable. Renal ultrasound to rule out hydronephrosis. TATIANA and complements. Check CPK levels. Strict urine output documentation. Basic metabolic profile daily. 2.  Diabetic ketoacidosis - secondary to poor compliance. Will need to rule out sepsis. 3.  Hypotension - secondary to dehydration induced by osmotic diuresis. 4.  Hypernatremia - estimated free water deficit 10 L.    5.  Hyperkalemia - secondary to insulin deficiency. Will monitor serum potassium closely but should improve rapidly with insulin administration. Prognosis is guarded. Thank you very much for the courtesy and confidence of this consultation.     Tico Mi MD FACP  Attending Nephrologist  9/8/2022 1:00 PM

## 2022-09-08 NOTE — H&P
[facility]     HISTORY AND PHYSICAL EXAMINATION            Date:   9/8/2022  Patient name:  Wicho Irwin  Date of admission:  9/8/2022 11:01 AM  MRN:   [id number]  Account:  [de-identified]  YOB: 1965  PCP:    Hilario Ruth MD  Room:   2007/2007-01  Code Status:    Full Code    Chief Complaint:     Chief Complaint   Patient presents with    Hyperglycemia    Altered Mental Status       History Obtained From:     EMR     History of Present Illness: The patient is a poor historian due to altered mental status. The patient is a 56 y.o. Non- / non  female with a history of asthma, bipolar 1 disorder, hypertension, stroke, polysubstance abuse, DM 2, degenerative disc disease, and depression with psychotic features, who presented to the ED with altered mental status. The patient was found unresponsive at her home where she lives alone. The patient had not been taking her insulin as her refrigerator was filled with unused insulin vials. The patients family had not heard from the patient for several days and EMS was called. The patient was found confused and obtunded, hypotensive, and hyperglycemic. The ED, the patient was hypotensive (BP 86/64) and tachypneic (RR 24). Blood glucose was 1,034. Serum potassium was 5.5, bicarbonate was 19 mmol/L [anion gap 25 mmol/L). Cr was 3.56 (baseline Cr 0.73). Levophed was administered. The patient was admitted for management of DKA and DKA protocol was initiated.      Past Medical History:     Past Medical History:   Diagnosis Date    Acid reflux 5/29/2017    Acute cystitis with hematuria 10/11/2016    Acute non-recurrent maxillary sinusitis 11/28/2017    Asthma     Bipolar 1 disorder ([facility] 1/4/2018    Bipolar disorder, mixed ([facility]     BMI 34.0-34.9,adult 11/28/2017    Cannabis use disorder, severe, dependence ([address] 12/19/2017    Cerebrovascular accident (CVA) ([address] 6/14/2017    Chest pain 11/5/2016    Chronic renal insufficiency     Cocaine abuse ([address] 1/5/2018    COVID-19 virus RNA test result unknown     DDD (degenerative disc disease), cervical     Diabetes mellitus ([address]     Dizziness 6/13/2017    Fibromyalgia     History of stroke 9/9/2017    Homicidal ideation 11/6/2017    Hyperglycemia     Hypertension     Hypotension 1/18/2019    IDDM (insulin dependent diabetes mellitus) 12/21/2015    Lupus ([address]     Migraine     Neuropathy     Neuropathy     Polysubstance abuse ([address] 9/17/2017    Post traumatic stress disorder (PTSD)     Posttraumatic stress disorder 5/29/2017    Recurrent depression ([address] 5/29/2017    Recurrent major depressive disorder, in partial remission ([address] 11/28/2017    Screening mammogram, encounter for 11/28/2017    Severe recurrent major depression with psychotic features ([address] 12/19/2017    Severe recurrent major depression without psychotic features ([address] 12/19/2017    Stroke (cerebrum) ([address] 6/14/2017    Stroke ([address]     per chart notes    Suicidal ideation     Suicidal intent 3/10/2017    Vitamin D deficiency 11/28/2017    White matter changes 6/13/2017        Past SurgicalHistory:     Past Surgical History:   Procedure Laterality Date    ABDOMEN SURGERY      drain tube    ABSCESS DRAINAGE      right buttock    CATARACT REMOVAL WITH IMPLANT Bilateral     CHEST TUBE INSERTION      FINGER AMPUTATION Left 03/13/2021    LEFT RING FINER AMPUTATION performed by Daphney Guerrero MD at [address] Right 10/11/2021    AMPUTATION RIGHT INDEX FINGER performed by Daphney Guerrero MD at [address] Right 1/27/2022    FOOT ABSCESS INCISION AND DRAINAGE  (PULSE LAVAGE, CULTURE SWABS) performed by Sandip Ribeiro DPM at [facility] Right 01/27/2022    FOOT ABSCESS INCISION AND DRAINAGE (PULSE LAVAGE, CULTURE SWABS) - Right HAND SURGERY Right 09/14/2021    I&D INDEX FINGER performed by Wes Scherer MD at [address] Right 09/15/2021    I&D INDEX FINGER performed by Wes Scherer MD at [address]  2/3/2022         LASIK Bilateral         Medications Prior to Admission:        Prior to Admission medications    Medication Sig Start Date End Date Taking? Authorizing Provider   insulin glargine (LANTUS SOLOSTAR) 100 UNIT/ML injection pen Inject 30 Units into the skin 2 times daily 4/22/22   Lyubov Reyes MD   atorvastatin (LIPITOR) 40 MG tablet Take 1 tablet by mouth nightly 4/22/22 9/8/22  Lyubov Reyes MD   acetaminophen (TYLENOL) 500 MG tablet Take 2 tablets by mouth 3 times daily as needed for Pain  Patient not taking: Reported on 8/8/2022 3/15/22   Lyubov Reyes MD   fluticasone (FLONASE) 50 MCG/ACT nasal spray 1 spray by Each Nostril route daily 3/15/22   Lyubov Reyes MD   metFORMIN (GLUCOPHAGE) 1000 MG tablet Take 1 tablet by mouth 2 times daily (with meals) 3/15/22   Lyubov Reyes MD   mirtazapine (REMERON) 7.5 MG tablet Take 1 tablet by mouth nightly 3/15/22   Lyubov Reyes MD   traZODone (DESYREL) 150 MG tablet Take 1 tablet by mouth nightly 3/15/22   Lyubov Reyes MD   venlafaxine (EFFEXOR XR) 150 MG extended release capsule Take 1 capsule by mouth daily (with breakfast) 3/15/22   Lyubov Reyes MD   Alcohol Swabs 70 % PADS Use 1 swab 3 times daily as needed 3/15/22   Lyubov Reyes MD   blood glucose monitor strips Test 3 times a day & as needed for symptoms of irregular blood glucose. Dispense sufficient amount for indicated testing frequency plus additional to accommodate PRN testing needs.  3/15/22   Lyubov Reyes MD   Lancets MISC 1 each by Does not apply route 3 times daily 3/15/22   Lyubov Reyes MD   Insulin Pen Needle ([employer] PEN NEEDLES 31G) 31G X 8 MM MISC 1 each by Does not apply route daily 3/15/22   Lyubov Reyes MD FREESTYLE LITE strip 1 each by Does not apply route 3 times daily 3/15/22   Lisa Castorena MD   budesonide-formoterol (SYMBICORT) 80-4.5 MCG/ACT AERO Inhale 2 puffs into the lungs 2 times daily 3/15/22   Lisa Castorena MD   albuterol sulfate  (90 Base) MCG/ACT inhaler Inhale 2 puffs into the lungs every 4 hours as needed for Wheezing 3/15/22 4/15/22  Lisa Castorena MD   mupirocin (BACTROBAN) 2 % ointment Apply topically 3 times daily Apply topically to right foot wound two times a day for wound care. Apply to right foot wound, cover with Kerlix and ace wrap. Historical Provider, MD   Misc. Devices MISC 1 PAIR OF DIABETIC SHOES (1 LEFT/ 1 RIGHT)  1-3 PAIRS OF INSERTS (LEFT/ RIGHT) 2/15/22   Jessa Hamm DPVALERIA   dicyclomine (BENTYL) 10 MG capsule Take 1 capsule by mouth 4 times daily 12/17/21   Lisa Castorena MD        Allergies:     Bactrim [sulfamethoxazole-trimethoprim] and Adhesive tape    Social History:     Tobacco:    reports that she has never smoked. She has never used smokeless tobacco.  Alcohol:      reports that she does not currently use alcohol. Drug Use:  reports current drug use. Drugs: Marijuana (Weed) and Cocaine. Family History:     Family History   Problem Relation Age of Onset    Diabetes Mother     Stroke Mother     Diabetes Father     Diabetes Sister     Diabetes Brother     Other Son         GSW    No Known Problems Sister        Review of Systems:     Positive and Negative as described in HPI. Unable to complete ROS due to altered mental status. Physical Exam:   /80   Pulse (!) 104   Resp 17   Wt 210 lb (95.3 kg)   LMP  (LMP Unknown)   SpO2 96%   BMI 34.95 kg/m²   No data recorded. No results for input(s): POCGLU in the last 72 hours. No intake or output data in the 24 hours ending 09/08/22 1638    Physical Exam  Constitutional:       General: She is not in acute distress. Appearance: Normal appearance.    HENT:      Head: Normocephalic and atraumatic. Nose: No congestion or rhinorrhea. Mouth/Throat:      Mouth: Mucous membranes are dry. Eyes:      Extraocular Movements: Extraocular movements intact. Conjunctiva/sclera: Conjunctivae normal.      Pupils: Pupils are equal, round, and reactive to light. Cardiovascular:      Rate and Rhythm: Normal rate and regular rhythm. Pulses: Normal pulses. Heart sounds: Normal heart sounds. No murmur heard. No friction rub. No gallop. Pulmonary:      Effort: Pulmonary effort is normal. No respiratory distress. Breath sounds: Normal breath sounds. No stridor. No wheezing or rales. Abdominal:      General: Abdomen is flat. Bowel sounds are normal. There is no distension. Tenderness: There is no abdominal tenderness. There is no guarding. Musculoskeletal:         General: No swelling or tenderness. Right lower leg: No edema. Left lower leg: No edema. Skin:     Capillary Refill: Capillary refill takes less than 2 seconds. Coloration: Skin is not jaundiced or pale. Neurological:      General: No focal deficit present. Mental Status: She is alert. Motor: No weakness.    Psychiatric:         Mood and Affect: Mood normal.       Investigations:     Laboratory Testing:  Recent Results (from the past 24 hour(s))   CBC with Auto Differential    Collection Time: 09/08/22 11:24 AM   Result Value Ref Range    WBC 10.9 3.5 - 11.0 k/uL    RBC 4.54 4.0 - 5.2 m/uL    Hemoglobin 14.4 12.0 - 16.0 g/dL    Hematocrit 46.7 (H) 36 - 46 %    .8 (H) 80 - 100 fL    MCH 31.6 26 - 34 pg    MCHC 30.8 (L) 31 - 37 g/dL    RDW 14.3 11.5 - 14.9 %    Platelets 264 079 - 668 k/uL    MPV 10.1 6.0 - 12.0 fL    Seg Neutrophils 86 (H) 36 - 66 %    Lymphocytes 6 (L) 24 - 44 %    Monocytes 8 (H) 1 - 7 %    Eosinophils % 0 0 - 4 %    Basophils 0 0 - 2 %    Segs Absolute 9.30 (H) 1.3 - 9.1 k/uL    Absolute Lymph # 0.70 (L) 1.0 - 4.8 k/uL    Absolute Mono # 0.90 0.1 - 1.3 k/uL Absolute Eos # 0.00 0.0 - 0.4 k/uL    Basophils Absolute 0.00 0.0 - 0.2 k/uL   Comprehensive Metabolic Panel    Collection Time: 09/08/22 11:24 AM   Result Value Ref Range    Glucose 1,036 (HH) 70 - 99 mg/dL     (HH) 6 - 20 mg/dL    Creatinine 3.56 (H) 0.50 - 0.90 mg/dL    Calcium 10.3 8.6 - 10.4 mg/dL    Sodium 161 (HH) 135 - 144 mmol/L    Potassium 5.5 (H) 3.7 - 5.3 mmol/L    Chloride 117 (H) 98 - 107 mmol/L    CO2 19 (L) 20 - 31 mmol/L    Anion Gap 25 (H) 9 - 17 mmol/L    Alkaline Phosphatase 151 (H) 35 - 104 U/L    ALT 15 5 - 33 U/L    AST 11 <32 U/L    Total Bilirubin 0.4 0.3 - 1.2 mg/dL    Total Protein 8.9 (H) 6.4 - 8.3 g/dL    Albumin 4.0 3.5 - 5.2 g/dL    GFR Non-African American 13 (L) >60 mL/min    GFR  16 (L) >60 mL/min    GFR Comment         Magnesium    Collection Time: 09/08/22 11:24 AM   Result Value Ref Range    Magnesium 3.3 (H) 1.6 - 2.6 mg/dL   TOX SCR, BLD, ED    Collection Time: 09/08/22 11:24 AM   Result Value Ref Range    Acetaminophen Level <5 (L) 10 - 30 ug/mL    Ethanol <10 <10 mg/dL    Ethanol percent <1.337 %    Salicylate Lvl <1 (L) 3 - 10 mg/dL    Toxic Tricyclic Sc,Blood WRONG TEST ORDERED NEGATIVE   Ammonia    Collection Time: 09/08/22 11:24 AM   Result Value Ref Range    Ammonia 12 11 - 51 umol/L   Beta-Hydroxybutyrate    Collection Time: 09/08/22 11:24 AM   Result Value Ref Range    Beta-Hydroxybutyrate 5.47 (H) 0.02 - 0.27 mmol/L   Blood Gas, Venous    Collection Time: 09/08/22 11:35 AM   Result Value Ref Range    pH, Alex 7.194 (LL) 7.320 - 7.420    pCO2, Alex 48.1 39.0 - 55.0    pO2, Alex 32.6 30.0 - 50.0    HCO3, Venous 18.5 (L) 24.0 - 30.0 mmol/L    Negative Base Excess, Alex 9.6 (H) 0.0 - 2.0 mmol/L    O2 Sat, Alex 46.7 (L) 60.0 - 85.0 %    Carboxyhemoglobin 1.8 0 - 5 %    Methemoglobin 0.7 0.0 - 1.9 %    Pt Temp 37.0     Danish Test NA     Sample Site NA    CK    Collection Time: 09/08/22  2:16 PM   Result Value Ref Range    Total  (H) 26 - 192 U/L Uric Acid    Collection Time: 09/08/22  2:16 PM   Result Value Ref Range    Uric Acid 16.2 (H) 2.4 - 5.7 mg/dL   Glucose, Random    Collection Time: 09/08/22  2:16 PM   Result Value Ref Range    Glucose 1,034 (HH) 70 - 99 mg/dL       Imaging/Diagnostics:  CT HEAD WO CONTRAST    Result Date: 9/8/2022  EXAMINATION: CT OF THE HEAD WITHOUT CONTRAST  9/8/2022 11:38 am TECHNIQUE: CT of the head was performed without the administration of intravenous contrast. Automated exposure control, iterative reconstruction, and/or weight based adjustment of the mA/kV was utilized to reduce the radiation dose to as low as reasonably achievable. COMPARISON: MRI 08/04/2022, 08/03/2022 and CT 08/03/2022. HISTORY: ORDERING SYSTEM PROVIDED HISTORY: Mental Status Changes TECHNOLOGIST PROVIDED HISTORY: Mental Status Changes Decision Support Exception - unselect if not a suspected or confirmed emergency medical condition->Emergency Medical Condition (MA) Is the patient pregnant?->No Reason for Exam: AMS Additional signs and symptoms: AMS FINDINGS: BRAIN/VENTRICLES: There is no acute intracranial hemorrhage, mass effect or midline shift. No abnormal extra-axial fluid collection. Encephalomalacia in the left frontal lobe demonstrated corresponding to recently demonstrated ischemia. Cortical atrophy and chronic white matter changes in the brain and associated ventricular enlargement are again demonstrated. ORBITS: The visualized portion of the orbits demonstrate no acute abnormality. SINUSES: The visualized paranasal sinuses and mastoid air cells demonstrate no acute abnormality. SOFT TISSUES/SKULL:  No acute abnormality of the visualized skull or soft tissues. Chronic findings in the brain without acute CT abnormality identified. Expected evolution of recently demonstrated ischemia in the left frontal lobe. XR CHEST PORTABLE    Result Date: 8/17/2022  EXAMINATION: ONE XRAY VIEW OF THE CHEST 8/17/2022 8:09 pm COMPARISON: None. HISTORY: ORDERING SYSTEM PROVIDED HISTORY: chest pain TECHNOLOGIST PROVIDED HISTORY: chest pain FINDINGS: Chest radiograph: The cardiomediastinal silhouette and hilar contours are normal. The lungs are clear with no focal consolidation, pleural effusion or pneumothorax. The overlying soft tissue and osseous structures do not demonstrate acute abnormality. No acute intrathoracic pathology. US RETROPERITONEAL COMPLETE    Result Date: 9/8/2022  EXAMINATION: RETROPERITONEAL ULTRASOUND OF THE KIDNEYS AND URINARY BLADDER 9/8/2022 COMPARISON: None HISTORY: ORDERING SYSTEM PROVIDED HISTORY: Acute kidney injury TECHNOLOGIST PROVIDED HISTORY: Acute kidney injury 68-year-old female with acute kidney injury FINDINGS: Kidneys: Right kidney measures 10.4 x 4.3 x 5.0 cm. Right renal cortical thickness measures 1.7 cm. Left kidney measures 9.4 x 4.8 x 4.8 cm. Left renal cortical thickness measures 1.8 cm. No hydronephrosis or perinephric fluid. No echogenic foci with posterior acoustic shadowing to suggest renal calculi. Gross preservation of the bilateral corticomedullary differentiation. Bladder: Not imaged. Unremarkable renal ultrasound. No hydronephrosis.        Assessment :      Primary Problem  DKA, type 1, not at goal [facility])    Active Hospital Problems    Diagnosis Date Noted    DKA, type 1, not at goal [facility]) [E10.10] 09/08/2022     Priority: Medium    DKA, type 2, not at goal [facility]) [E11.10] 09/08/2022     Priority: Medium       Plan:     Patient status Admit as inpatient in the  SurgicalICU    DKA, 2/2 poor insulin compliance  -Glucose on admssion: 1,034  -Na+: 161; K+ 5.5; serum osmolality 328  -NPO effective immediately  -DKA protocol started  -BMP q4h, initial Mag and Phos levels  -Glucose checks every hour  -Urinalysis reviewed and urine ketones: pending   -Betahydroxybutarate levels: 5.47  -Home Lantus 30 units BID held  -IV Normal Saline at 250 mL/hr  -Insulin Regular started at 0.1 Units/kg/hr  -D5 and 0.45% NS at 150 mL/hr when blood glucose less than 250 mg/dL  -Will monitor bicarb and anion gap, bridge with home lantus dose and begin diet PO when bicarb >15 and gap closed x 2 measurements  -Discontinue Insulin drip 2 hours post PO intake. -Hypoglycemia, phos and potassium replacement protocols in place   -Diabetes educator consulted   -General surgery consulted for central line placement      Acute Kidney Injury  -Baseline Cr 0.73; Cr on admission: 3.56  -Continue IV NS infusion   -BMP q4 hours  -Nephrology consulted       Depression with psychotic features  -Continue Haloperidol lactate 5 mg IM q 6 hours PRN  -Continue Mirtazapine 7.5 mg po nightly  -Continue Trazodone 150 mg po nightly  -Continue Venlafaxine 150 mg po daily  -Psychiatry consulted      Asthma  -Continue home Proventil ventolin 2 puff q 4 hours as needed   -Continue home Symbicort 2 puffs BID  -Continue home Flonase 1 spray each nostril daily       Code: Full code  Diet: NPO  DVT prophylaxis: Lovenox 30 mg subcut. Consultations:   IP CONSULT TO NEPHROLOGY  IP CONSULT TO PRIMARY CARE PROVIDER  IP CONSULT TO PULMONOLOGY  IP CONSULT TO DIABETES EDUCATOR  IP CONSULT TO [facility]      Ely Alicea MD  9/8/2022  4:38 PM    Copy sent to Dr. Lashell Stevens MD   Attending Physician Statement  I have discussed the care of [address] [place] [address] and I have examined the patient myselft and taken ros and hpi , including pertinent history and exam findings,  with the resident. I have reviewed the key elements of all parts of the encounter with the resident. I agree with the assessment, plan and orders as documented by the resident. Critical care Spent 55 minutes in reviewing data/medicines/talking to patient/family,  explaining and answering all the questions.     57-year-old female with underlying history of diabetes, smoking, noncompliance, polysubstance abuse, brought to the ER after she was found down, found to be severely dehydrated, in shock, hypovolemic, septic, severely deranged electrolytes, metabolic encephalopathy multifactorial, uremic encephalopathy, electrolyte imbalance, acute kidney injury with acute tubular necrosis, major depression with psychotic features, fluid resuscitated, nephrology on board, pulmonary critical care on board, patient in restraints, patient has been very aggressive and high likelihood of harming herself pulling lines,, NG in place, high risk of upper GI bleed, monitor hemoglobin,  On Protonix 40 mg twice daily,  Prognosis is very poor, she also is positive for cocaine and fentanyl    Electronically signed by Jeanmarie Patel MD

## 2022-09-08 NOTE — ED NOTES
Mode of arrival (squad #, walk in, police, etc) : LS 1         Chief complaint(s): altered mental status, high blood sugar         Arrival Note (brief scenario, treatment PTA, etc). : patient arrived via EMS after call out for AMS. EMS states patient normally has some check on her. But states the last time someone checked on her is unknown. Patient's BSG reading is high above 600 on EMS monitor. Patient had low BP reading in the 80s SBP so placed patient on rescue guard. Patient was only responsive to pain per squad. Patient alert but confused on arrival. Patient is able to follow commands with guidance. Patient moaning and stating \"help me\". C= \"Have you ever felt that you should Cut down on your drinking? \"  WILLIAM  A= \"Have people Annoyed you by criticizing your drinking? \"  WILLIAM  G= \"Have you ever felt bad or Guilty about your drinking? \"  WILLIAM  E= \"Have you ever had a drink as an Eye-opener first thing in the morning to steady your nerves or to help a hangover? \"  WILLIAM      Deferred []      Reason for deferring: Altered mental status. *If yes to two or more: probable alcohol abuse. Dominick Encinas RN  09/08/22 0171

## 2022-09-09 ENCOUNTER — APPOINTMENT (OUTPATIENT)
Dept: NON INVASIVE DIAGNOSTICS | Age: 57
DRG: 420 | End: 2022-09-09
Payer: COMMERCIAL

## 2022-09-09 ENCOUNTER — APPOINTMENT (OUTPATIENT)
Dept: GENERAL RADIOLOGY | Age: 57
DRG: 420 | End: 2022-09-09
Payer: COMMERCIAL

## 2022-09-09 PROBLEM — G93.49 UREMIC ENCEPHALOPATHY: Status: ACTIVE | Noted: 2022-09-09

## 2022-09-09 PROBLEM — N19 UREMIC ENCEPHALOPATHY: Status: ACTIVE | Noted: 2022-09-09

## 2022-09-09 LAB
ABSOLUTE EOS #: 0 K/UL (ref 0–0.4)
ABSOLUTE LYMPH #: 0.5 K/UL (ref 1–4.8)
ABSOLUTE MONO #: 0.3 K/UL (ref 0.1–1.3)
ABSOLUTE RETIC #: 0.04 M/UL (ref 0.02–0.1)
ALLEN TEST: ABNORMAL
ANION GAP SERPL CALCULATED.3IONS-SCNC: 10 MMOL/L (ref 9–17)
ANION GAP SERPL CALCULATED.3IONS-SCNC: 12 MMOL/L (ref 9–17)
ANION GAP SERPL CALCULATED.3IONS-SCNC: 13 MMOL/L (ref 9–17)
ANION GAP SERPL CALCULATED.3IONS-SCNC: ABNORMAL MMOL/L (ref 9–17)
BASOPHILS # BLD: 0 % (ref 0–2)
BASOPHILS ABSOLUTE: 0 K/UL (ref 0–0.2)
BUN BLDV-MCNC: 105 MG/DL (ref 6–20)
BUN BLDV-MCNC: 112 MG/DL (ref 6–20)
BUN BLDV-MCNC: 86 MG/DL (ref 6–20)
BUN BLDV-MCNC: 96 MG/DL (ref 6–20)
CALCIUM SERPL-MCNC: 8.2 MG/DL (ref 8.6–10.4)
CALCIUM SERPL-MCNC: 8.4 MG/DL (ref 8.6–10.4)
CALCIUM SERPL-MCNC: 8.6 MG/DL (ref 8.6–10.4)
CALCIUM SERPL-MCNC: 8.7 MG/DL (ref 8.6–10.4)
CHLORIDE BLD-SCNC: 134 MMOL/L (ref 98–107)
CHLORIDE BLD-SCNC: 139 MMOL/L (ref 98–107)
CHLORIDE BLD-SCNC: 140 MMOL/L (ref 98–107)
CHLORIDE BLD-SCNC: >140 MMOL/L (ref 98–107)
CO2: 18 MMOL/L (ref 20–31)
CO2: 19 MMOL/L (ref 20–31)
CO2: 19 MMOL/L (ref 20–31)
CO2: 20 MMOL/L (ref 20–31)
CREAT SERPL-MCNC: 1.92 MG/DL (ref 0.5–0.9)
CREAT SERPL-MCNC: 2.18 MG/DL (ref 0.5–0.9)
CREAT SERPL-MCNC: 2.62 MG/DL (ref 0.5–0.9)
CREAT SERPL-MCNC: 2.9 MG/DL (ref 0.5–0.9)
CREATININE URINE: 129.9 MG/DL (ref 28–217)
EOSINOPHILS RELATIVE PERCENT: 1 % (ref 0–4)
GFR AFRICAN AMERICAN: 20 ML/MIN
GFR AFRICAN AMERICAN: 23 ML/MIN
GFR AFRICAN AMERICAN: 28 ML/MIN
GFR AFRICAN AMERICAN: 33 ML/MIN
GFR NON-AFRICAN AMERICAN: 17 ML/MIN
GFR NON-AFRICAN AMERICAN: 19 ML/MIN
GFR NON-AFRICAN AMERICAN: 23 ML/MIN
GFR NON-AFRICAN AMERICAN: 27 ML/MIN
GFR SERPL CREATININE-BSD FRML MDRD: ABNORMAL ML/MIN/{1.73_M2}
GLUCOSE BLD-MCNC: 117 MG/DL (ref 65–105)
GLUCOSE BLD-MCNC: 161 MG/DL (ref 65–105)
GLUCOSE BLD-MCNC: 165 MG/DL (ref 70–99)
GLUCOSE BLD-MCNC: 170 MG/DL (ref 65–105)
GLUCOSE BLD-MCNC: 189 MG/DL (ref 65–105)
GLUCOSE BLD-MCNC: 203 MG/DL (ref 65–105)
GLUCOSE BLD-MCNC: 204 MG/DL (ref 65–105)
GLUCOSE BLD-MCNC: 205 MG/DL (ref 65–105)
GLUCOSE BLD-MCNC: 224 MG/DL (ref 65–105)
GLUCOSE BLD-MCNC: 229 MG/DL (ref 70–99)
GLUCOSE BLD-MCNC: 262 MG/DL (ref 65–105)
GLUCOSE BLD-MCNC: 301 MG/DL (ref 65–105)
GLUCOSE BLD-MCNC: 347 MG/DL (ref 65–105)
GLUCOSE BLD-MCNC: 358 MG/DL (ref 65–105)
GLUCOSE BLD-MCNC: 410 MG/DL (ref 65–105)
GLUCOSE BLD-MCNC: 432 MG/DL (ref 65–105)
GLUCOSE BLD-MCNC: 441 MG/DL (ref 65–105)
GLUCOSE BLD-MCNC: 469 MG/DL (ref 65–105)
GLUCOSE BLD-MCNC: 522 MG/DL (ref 70–99)
GLUCOSE BLD-MCNC: 589 MG/DL (ref 70–99)
GLUCOSE BLD-MCNC: 670 MG/DL (ref 70–99)
GLUCOSE BLD-MCNC: 711 MG/DL (ref 70–99)
GLUCOSE BLD-MCNC: 802 MG/DL (ref 70–99)
GLUCOSE BLD-MCNC: >600 MG/DL (ref 65–105)
HCO3 ARTERIAL: 18 MMOL/L (ref 22–26)
HCT VFR BLD CALC: 29.5 % (ref 36–46)
HCT VFR BLD CALC: 32.3 % (ref 36–46)
HEMOGLOBIN: 10.3 G/DL (ref 12–16)
HEMOGLOBIN: 9.6 G/DL (ref 12–16)
LV EF: 63 %
LVEF MODALITY: NORMAL
LYMPHOCYTES # BLD: 15 % (ref 24–44)
MAGNESIUM: 2.1 MG/DL (ref 1.6–2.6)
MAGNESIUM: 2.4 MG/DL (ref 1.6–2.6)
MAGNESIUM: 2.5 MG/DL (ref 1.6–2.6)
MAGNESIUM: 2.5 MG/DL (ref 1.6–2.6)
MCH RBC QN AUTO: 30.7 PG (ref 26–34)
MCHC RBC AUTO-ENTMCNC: 31.9 G/DL (ref 31–37)
MCV RBC AUTO: 96.3 FL (ref 80–100)
METHEMOGLOBIN: 1.2 % (ref 0–1.9)
MONOCYTES # BLD: 9 % (ref 1–7)
NEGATIVE BASE EXCESS, ART: 7.3 MMOL/L (ref 0–2)
O2 DEVICE/FLOW/%: ABNORMAL
O2 SAT, ARTERIAL: 94.2 % (ref 95–98)
PATIENT TEMP: 37
PCO2 ARTERIAL: 31.6 MMHG (ref 35–45)
PDW BLD-RTO: 13.2 % (ref 11.5–14.9)
PH ARTERIAL: 7.37 (ref 7.35–7.45)
PHOSPHORUS: 1.4 MG/DL (ref 2.6–4.5)
PHOSPHORUS: 2.1 MG/DL (ref 2.6–4.5)
PHOSPHORUS: 2.4 MG/DL (ref 2.6–4.5)
PHOSPHORUS: 2.7 MG/DL (ref 2.6–4.5)
PLATELET # BLD: 261 K/UL (ref 150–450)
PMV BLD AUTO: 9.7 FL (ref 6–12)
PO2 ARTERIAL: 76.4 MMHG (ref 80–100)
POTASSIUM SERPL-SCNC: 3.5 MMOL/L (ref 3.7–5.3)
POTASSIUM SERPL-SCNC: 3.6 MMOL/L (ref 3.7–5.3)
POTASSIUM SERPL-SCNC: 4.2 MMOL/L (ref 3.7–5.3)
POTASSIUM SERPL-SCNC: 4.2 MMOL/L (ref 3.7–5.3)
PT. POSITION: ABNORMAL
RBC # BLD: 3.35 M/UL (ref 4–5.2)
RESPIRATORY RATE: 20
RETIC %: 1.2 % (ref 0.5–2)
SAMPLE SITE: ABNORMAL
SEG NEUTROPHILS: 75 % (ref 36–66)
SEGMENTED NEUTROPHILS ABSOLUTE COUNT: 2.6 K/UL (ref 1.3–9.1)
SODIUM BLD-SCNC: 166 MMOL/L (ref 135–144)
SODIUM BLD-SCNC: 167 MMOL/L (ref 135–144)
SODIUM BLD-SCNC: 169 MMOL/L (ref 135–144)
SODIUM BLD-SCNC: 170 MMOL/L (ref 135–144)
SODIUM BLD-SCNC: 170 MMOL/L (ref 135–144)
TOTAL PROTEIN, URINE: 20 MG/DL
URINE TOTAL PROTEIN CREATININE RATIO: 0.15 (ref 0–0.2)
WBC # BLD: 3.5 K/UL (ref 3.5–11)

## 2022-09-09 PROCEDURE — 74018 RADEX ABDOMEN 1 VIEW: CPT

## 2022-09-09 PROCEDURE — 2500000003 HC RX 250 WO HCPCS: Performed by: STUDENT IN AN ORGANIZED HEALTH CARE EDUCATION/TRAINING PROGRAM

## 2022-09-09 PROCEDURE — 85025 COMPLETE CBC W/AUTO DIFF WBC: CPT

## 2022-09-09 PROCEDURE — 2580000003 HC RX 258: Performed by: STUDENT IN AN ORGANIZED HEALTH CARE EDUCATION/TRAINING PROGRAM

## 2022-09-09 PROCEDURE — 6370000000 HC RX 637 (ALT 250 FOR IP): Performed by: EMERGENCY MEDICINE

## 2022-09-09 PROCEDURE — 6360000002 HC RX W HCPCS: Performed by: STUDENT IN AN ORGANIZED HEALTH CARE EDUCATION/TRAINING PROGRAM

## 2022-09-09 PROCEDURE — 82947 ASSAY GLUCOSE BLOOD QUANT: CPT

## 2022-09-09 PROCEDURE — 82805 BLOOD GASES W/O2 SATURATION: CPT

## 2022-09-09 PROCEDURE — 80048 BASIC METABOLIC PNL TOTAL CA: CPT

## 2022-09-09 PROCEDURE — 6360000002 HC RX W HCPCS: Performed by: INTERNAL MEDICINE

## 2022-09-09 PROCEDURE — 2000000000 HC ICU R&B

## 2022-09-09 PROCEDURE — 2580000003 HC RX 258: Performed by: EMERGENCY MEDICINE

## 2022-09-09 PROCEDURE — 6370000000 HC RX 637 (ALT 250 FOR IP): Performed by: STUDENT IN AN ORGANIZED HEALTH CARE EDUCATION/TRAINING PROGRAM

## 2022-09-09 PROCEDURE — 85045 AUTOMATED RETICULOCYTE COUNT: CPT

## 2022-09-09 PROCEDURE — 36415 COLL VENOUS BLD VENIPUNCTURE: CPT

## 2022-09-09 PROCEDURE — 85018 HEMOGLOBIN: CPT

## 2022-09-09 PROCEDURE — 84295 ASSAY OF SERUM SODIUM: CPT

## 2022-09-09 PROCEDURE — A4216 STERILE WATER/SALINE, 10 ML: HCPCS | Performed by: STUDENT IN AN ORGANIZED HEALTH CARE EDUCATION/TRAINING PROGRAM

## 2022-09-09 PROCEDURE — 99291 CRITICAL CARE FIRST HOUR: CPT | Performed by: INTERNAL MEDICINE

## 2022-09-09 PROCEDURE — 99213 OFFICE O/P EST LOW 20 MIN: CPT

## 2022-09-09 PROCEDURE — 36600 WITHDRAWAL OF ARTERIAL BLOOD: CPT

## 2022-09-09 PROCEDURE — C9113 INJ PANTOPRAZOLE SODIUM, VIA: HCPCS | Performed by: STUDENT IN AN ORGANIZED HEALTH CARE EDUCATION/TRAINING PROGRAM

## 2022-09-09 PROCEDURE — 84100 ASSAY OF PHOSPHORUS: CPT

## 2022-09-09 PROCEDURE — 2580000003 HC RX 258: Performed by: INTERNAL MEDICINE

## 2022-09-09 PROCEDURE — 85014 HEMATOCRIT: CPT

## 2022-09-09 PROCEDURE — 83735 ASSAY OF MAGNESIUM: CPT

## 2022-09-09 PROCEDURE — 93306 TTE W/DOPPLER COMPLETE: CPT

## 2022-09-09 RX ORDER — INSULIN LISPRO 100 [IU]/ML
0-4 INJECTION, SOLUTION INTRAVENOUS; SUBCUTANEOUS NIGHTLY
Status: DISCONTINUED | OUTPATIENT
Start: 2022-09-09 | End: 2022-09-22 | Stop reason: HOSPADM

## 2022-09-09 RX ORDER — INSULIN LISPRO 100 [IU]/ML
0-4 INJECTION, SOLUTION INTRAVENOUS; SUBCUTANEOUS
Status: DISCONTINUED | OUTPATIENT
Start: 2022-09-09 | End: 2022-09-22 | Stop reason: HOSPADM

## 2022-09-09 RX ORDER — METOPROLOL TARTRATE 5 MG/5ML
2.5 INJECTION INTRAVENOUS EVERY 6 HOURS PRN
Status: DISCONTINUED | OUTPATIENT
Start: 2022-09-09 | End: 2022-09-09

## 2022-09-09 RX ORDER — DEXTROSE AND SODIUM CHLORIDE 5; .45 G/100ML; G/100ML
INJECTION, SOLUTION INTRAVENOUS CONTINUOUS
Status: DISCONTINUED | OUTPATIENT
Start: 2022-09-09 | End: 2022-09-10

## 2022-09-09 RX ORDER — POTASSIUM CHLORIDE 7.45 MG/ML
10 INJECTION INTRAVENOUS
Status: COMPLETED | OUTPATIENT
Start: 2022-09-09 | End: 2022-09-09

## 2022-09-09 RX ORDER — INSULIN GLARGINE 100 [IU]/ML
35 INJECTION, SOLUTION SUBCUTANEOUS ONCE
Status: COMPLETED | OUTPATIENT
Start: 2022-09-09 | End: 2022-09-09

## 2022-09-09 RX ORDER — LORAZEPAM 2 MG/ML
0.5 INJECTION INTRAMUSCULAR EVERY 6 HOURS PRN
Status: DISCONTINUED | OUTPATIENT
Start: 2022-09-09 | End: 2022-09-09

## 2022-09-09 RX ORDER — SODIUM CHLORIDE 450 MG/100ML
INJECTION, SOLUTION INTRAVENOUS CONTINUOUS
Status: DISCONTINUED | OUTPATIENT
Start: 2022-09-09 | End: 2022-09-09

## 2022-09-09 RX ORDER — DEXTROSE MONOHYDRATE 100 MG/ML
INJECTION, SOLUTION INTRAVENOUS CONTINUOUS PRN
Status: DISCONTINUED | OUTPATIENT
Start: 2022-09-09 | End: 2022-09-22 | Stop reason: HOSPADM

## 2022-09-09 RX ADMIN — SODIUM CHLORIDE 0.11 UNITS/KG/HR: 9 INJECTION, SOLUTION INTRAVENOUS at 13:52

## 2022-09-09 RX ADMIN — POTASSIUM CHLORIDE 10 MEQ: 7.46 INJECTION, SOLUTION INTRAVENOUS at 04:36

## 2022-09-09 RX ADMIN — HEPARIN SODIUM 5000 UNITS: 5000 INJECTION INTRAVENOUS; SUBCUTANEOUS at 05:27

## 2022-09-09 RX ADMIN — SODIUM CHLORIDE 40 MG: 9 INJECTION INTRAMUSCULAR; INTRAVENOUS; SUBCUTANEOUS at 11:29

## 2022-09-09 RX ADMIN — POTASSIUM CHLORIDE 10 MEQ: 7.46 INJECTION, SOLUTION INTRAVENOUS at 01:48

## 2022-09-09 RX ADMIN — SODIUM PHOSPHATE, MONOBASIC, MONOHYDRATE AND SODIUM PHOSPHATE, DIBASIC, ANHYDROUS 15 MMOL: 276; 142 INJECTION, SOLUTION INTRAVENOUS at 14:08

## 2022-09-09 RX ADMIN — METOPROLOL TARTRATE 2.5 MG: 5 INJECTION, SOLUTION INTRAVENOUS at 07:45

## 2022-09-09 RX ADMIN — SODIUM PHOSPHATE, MONOBASIC, MONOHYDRATE AND SODIUM PHOSPHATE, DIBASIC, ANHYDROUS 10 MMOL: 276; 142 INJECTION, SOLUTION INTRAVENOUS at 07:20

## 2022-09-09 RX ADMIN — SODIUM CHLORIDE: 9 INJECTION, SOLUTION INTRAVENOUS at 01:47

## 2022-09-09 RX ADMIN — SODIUM CHLORIDE: 9 INJECTION, SOLUTION INTRAVENOUS at 06:01

## 2022-09-09 RX ADMIN — DEXTROSE AND SODIUM CHLORIDE: 5; 450 INJECTION, SOLUTION INTRAVENOUS at 15:39

## 2022-09-09 RX ADMIN — SODIUM CHLORIDE 40 MG: 9 INJECTION INTRAMUSCULAR; INTRAVENOUS; SUBCUTANEOUS at 20:36

## 2022-09-09 RX ADMIN — DEXTROSE AND SODIUM CHLORIDE: 5; 450 INJECTION, SOLUTION INTRAVENOUS at 22:39

## 2022-09-09 RX ADMIN — MIRTAZAPINE 7.5 MG: 15 TABLET, FILM COATED ORAL at 20:37

## 2022-09-09 RX ADMIN — DEXTROSE AND SODIUM CHLORIDE: 5; 450 INJECTION, SOLUTION INTRAVENOUS at 10:31

## 2022-09-09 RX ADMIN — POTASSIUM CHLORIDE 10 MEQ: 7.46 INJECTION, SOLUTION INTRAVENOUS at 07:20

## 2022-09-09 RX ADMIN — ATORVASTATIN CALCIUM 40 MG: 40 TABLET, FILM COATED ORAL at 20:36

## 2022-09-09 RX ADMIN — LORAZEPAM 0.5 MG: 2 INJECTION INTRAMUSCULAR; INTRAVENOUS at 11:17

## 2022-09-09 RX ADMIN — HALOPERIDOL LACTATE 5 MG: 5 INJECTION, SOLUTION INTRAMUSCULAR at 00:46

## 2022-09-09 RX ADMIN — HALOPERIDOL LACTATE 5 MG: 5 INJECTION, SOLUTION INTRAMUSCULAR at 17:56

## 2022-09-09 RX ADMIN — TRAZODONE HYDROCHLORIDE 150 MG: 100 TABLET ORAL at 20:37

## 2022-09-09 RX ADMIN — FLUTICASONE PROPIONATE 1 SPRAY: 50 SPRAY, METERED NASAL at 09:00

## 2022-09-09 RX ADMIN — POTASSIUM CHLORIDE 10 MEQ: 7.46 INJECTION, SOLUTION INTRAVENOUS at 05:47

## 2022-09-09 RX ADMIN — POTASSIUM CHLORIDE 10 MEQ: 7.46 INJECTION, SOLUTION INTRAVENOUS at 09:11

## 2022-09-09 RX ADMIN — SODIUM CHLORIDE: 4.5 INJECTION, SOLUTION INTRAVENOUS at 07:24

## 2022-09-09 RX ADMIN — POTASSIUM CHLORIDE 10 MEQ: 7.46 INJECTION, SOLUTION INTRAVENOUS at 00:49

## 2022-09-09 RX ADMIN — INSULIN GLARGINE 35 UNITS: 100 INJECTION, SOLUTION SUBCUTANEOUS at 15:45

## 2022-09-09 ASSESSMENT — PAIN SCALES - GENERAL
PAINLEVEL_OUTOF10: 0

## 2022-09-09 NOTE — CARE COORDINATION
CASE MANAGEMENT NOTE:    Admission Date:  9/8/2022 Jamar Monroy is a 56 y.o.  female    Admitted for : DKA, type 1, not at goal ([facility] [E10.10]  DKA, type 2, not at goal [facility]) [E11.10]  Acute renal failure, unspecified acute renal failure type ([facility] [N17.9]  Diabetic ketoacidosis with coma associated with type 1 diabetes mellitus ([facility] [E10.11]    Writer reviewed chart. Pt disoriented at this time, and does not have any emergency contracts listed. PCP:  Dr. Bautista Mckeon:  Eric      Current Residence/ Living Arrangements:  independently at home             Current Services PTA:  unknown    Does patient go to outpatient dialysis: No  If yes, location and chair time: n/a  Who is their nephrologist? N/a    Is patient agreeable to VNS: unsure, will have to ask patient when she is more oriented    Freedom of choice provided:  NA    List of [address] provided: NA    VNS chosen:  NA    DME:  unknown    Home Oxygen: No    Nebulizer: No    CPAP/BIPAP: No    Supplier: N/A    Potential Assistance Needed: To be determined. Hx drug abuse - will need     SNF needed: unsure    Freedom of choice and list provided: NA    Pharmacy:  [facility]       Is patient currently receiving oral anticoagulation therapy? No    Is the Patient an [facility] with Readmission Risk Score greater than 14%? No  If yes, pt needs a follow up appointment made within 7 days. Family Members/Caregivers that pt would like involved in their care:    No    If yes, list name here:  n/a    Transportation Provider:  unsure             Discharge Plan:  to be determined.                  Electronically signed by: Ainsley Mack RN on 9/9/2022 at 11:28 AM

## 2022-09-09 NOTE — PLAN OF CARE
Problem: Discharge Planning  Goal: Discharge to home or other facility with appropriate resources  Outcome: Progressing  Flowsheets  Taken 9/8/2022 2038 by Roberto Goodman RN  Discharge to home or other facility with appropriate resources:   Identify barriers to discharge with patient and caregiver   Identify discharge learning needs (meds, wound care, etc)    Problem: Pain  Goal: Verbalizes/displays adequate comfort level or baseline comfort level  Outcome: Progressing     Problem: Skin/Tissue Integrity  Goal: Absence of new skin breakdown  Description: 1. Monitor for areas of redness and/or skin breakdown  2. Assess vascular access sites hourly  3. Every 4-6 hours minimum:  Change oxygen saturation probe site  4. Every 4-6 hours:  If on nasal continuous positive airway pressure, respiratory therapy assess nares and determine need for appliance change or resting period.   Outcome: Progressing     Problem: ABCDS Injury Assessment  Goal: Absence of physical injury  Outcome: Progressing  Flowsheets (Taken 9/9/2022 0125)  Absence of Physical Injury: Implement safety measures based on patient assessment     Problem: Safety - Adult  Goal: Free from fall injury  Outcome: Progressing     Problem: Neurosensory - Adult  Goal: Achieves stable or improved neurological status  Outcome: Progressing     Problem: Neurosensory - Adult  Goal: Absence of seizures  Outcome: Progressing     Problem: Neurosensory - Adult  Goal: Remains free of injury related to seizures activity  Outcome: Progressing     Problem: Neurosensory - Adult  Goal: Achieves maximal functionality and self care  Outcome: Progressing     Problem: Cardiovascular - Adult  Goal: Maintains optimal cardiac output and hemodynamic stability  Outcome: Progressing     Problem: Cardiovascular - Adult  Goal: Absence of cardiac dysrhythmias or at baseline  Outcome: Progressing     Problem: Skin/Tissue Integrity - Adult  Goal: Skin integrity remains intact  Outcome: Progressing  Flowsheets (Taken 9/8/2022 2038)  Skin Integrity Remains Intact: Monitor for areas of redness and/or skin breakdown     Problem: Skin/Tissue Integrity - Adult  Goal: Incisions, wounds, or drain sites healing without S/S of infection  Outcome: Progressing     Problem: Skin/Tissue Integrity - Adult  Goal: Oral mucous membranes remain intact  Outcome: Progressing     Problem: Musculoskeletal - Adult  Goal: Return mobility to safest level of function  Outcome: Progressing     Problem: Musculoskeletal - Adult  Goal: Return ADL status to a safe level of function  Outcome: Progressing     Problem: Gastrointestinal - Adult  Goal: Minimal or absence of nausea and vomiting  Outcome: Progressing     Problem: Gastrointestinal - Adult  Goal: Maintains or returns to baseline bowel function  Outcome: Progressing     Problem: Gastrointestinal - Adult  Goal: Maintains adequate nutritional intake  Outcome: Progressing     Problem: Genitourinary - Adult  Goal: Absence of urinary retention  Outcome: Progressing     Problem: Genitourinary - Adult  Goal: Urinary catheter remains patent  Outcome: Progressing     Problem: Infection - Adult  Goal: Absence of infection at discharge  Outcome: Progressing     Problem: Infection - Adult  Goal: Absence of infection during hospitalization  Outcome: Progressing     Problem: Metabolic/Fluid and Electrolytes - Adult  Goal: Electrolytes maintained within normal limits  Outcome: Progressing     Problem: Metabolic/Fluid and Electrolytes - Adult  Goal: Hemodynamic stability and optimal renal function maintained  Outcome: Progressing     Problem: Metabolic/Fluid and Electrolytes - Adult  Goal: Glucose maintained within prescribed range  Outcome: Progressing     Problem: Hematologic - Adult  Goal: Maintains hematologic stability  Outcome: Progressing

## 2022-09-09 NOTE — PROGRESS NOTES
Called and informed the medicine residents about potassium level, no new orders. RN also notified them that patients blood sugar was now 189 and they said to try to wake the patient to eat and if unsuccessful to call back.

## 2022-09-09 NOTE — PROGRESS NOTES
Message sent to Dr. Angie Randolph regarding pt potassium. Orders for one time dose of 20meq potassium.

## 2022-09-09 NOTE — PROGRESS NOTES
Message sent to Dr. Osvaldo Malloy with results of chest xray with central line placement. Okay to use line.

## 2022-09-09 NOTE — CONSULTS
[facility]  Consult Note       NAME:  Isiah Solomon  MEDICAL RECORD NUMBER:  [id number]  AGE: 56 y.o.    GENDER: female  : 1965  TODAY'S DATE:  2022    Subjective:      Isiah Solomon is a 56 y.o. female with inpatient referral to [facility] for:  Wounds to abdominal folds, breast folds, and buttocks      Wound Identification:  Wound Type:  moisture associated skin damage  Contributing Factors: diabetes, poor glucose control, obesity, and decreased tissue oxygenation    Wound History: skin warm and moist, a few areas of breakdown noted in abdominal/breast folds, buttocks  Current Wound Care Treatment:  TORO    Patient Goal of Care:  [x] Wound Healing  [] Odor Control  [] Palliative Care  [] Pain Control   [] Other:         PAST MEDICAL HISTORY        Diagnosis Date    Acid reflux 2017    Acute cystitis with hematuria 10/11/2016    Acute non-recurrent maxillary sinusitis 2017    Asthma     Bipolar 1 disorder ([address] 2018    Bipolar disorder, mixed ([address]     BMI 34.0-34.9,adult 2017    Cannabis use disorder, severe, dependence ([address] 2017    Cerebrovascular accident (CVA) ([address] 2017    Chest pain 2016    Chronic renal insufficiency     Cocaine abuse ([address] 2018    COVID-19 virus RNA test result unknown     DDD (degenerative disc disease), cervical     Diabetes mellitus ([address]     Dizziness 2017    Fibromyalgia     History of stroke 2017    Homicidal ideation 2017    Hyperglycemia     Hypertension     Hypotension 2019    IDDM (insulin dependent diabetes mellitus) 2015    Lupus ([address]     Migraine     Neuropathy     Neuropathy     Polysubstance abuse ([address] 2017    Post traumatic stress disorder (PTSD)     Posttraumatic stress disorder 2017    Recurrent depression ([address] 2017    Recurrent major depressive disorder, in partial remission ([address] 2017    Screening mammogram, encounter for 11/28/2017    Severe recurrent major depression with psychotic features ([facility] 12/19/2017    Severe recurrent major depression without psychotic features ([facility] 12/19/2017    Stroke (cerebrum) ([facility] 6/14/2017    Stroke ([facility]     per chart notes    Suicidal ideation     Suicidal intent 3/10/2017    Vitamin D deficiency 11/28/2017    White matter changes 6/13/2017       PAST SURGICAL HISTORY    Past Surgical History:   Procedure Laterality Date    ABDOMEN SURGERY      drain tube    ABSCESS DRAINAGE      right buttock    CATARACT REMOVAL WITH IMPLANT Bilateral     CHEST TUBE INSERTION      FINGER AMPUTATION Left 03/13/2021    LEFT RING FINER AMPUTATION performed by Lee Hayes MD at [address] Right 10/11/2021    AMPUTATION RIGHT INDEX FINGER performed by Lee Hayes MD at [address] Right 1/27/2022    FOOT ABSCESS INCISION AND DRAINAGE  (PULSE LAVAGE, CULTURE SWABS) performed by Barbara Gillette DPM at [address] [facility] Right 01/27/2022    FOOT ABSCESS INCISION AND DRAINAGE (PULSE LAVAGE, CULTURE SWABS) - Right    HAND SURGERY Right 09/14/2021    I&D INDEX FINGER performed by Lee Hayes MD at [address] Right 09/15/2021    I&D INDEX FINGER performed by Lee Hayes MD at [address] [facility]  2/3/2022         LASIK Bilateral        FAMILY HISTORY    Family History   Problem Relation Age of Onset    Diabetes Mother     Stroke Mother     Diabetes Father     Diabetes Sister     Diabetes Brother     Other Son         GSW    No Known Problems Sister        SOCIAL HISTORY    Social History     Tobacco Use    Smoking status: Never    Smokeless tobacco: Never   Vaping Use    Vaping Use: Never used   Substance Use Topics    Alcohol use: Not Currently    Drug use: Yes     Types: Marijuana [place]), Cocaine     Comment: + Cocaine 2/2021 also, see Care Everywhere UDS         ALLERGIES    Allergies   Allergen Reactions    Bactrim [Sulfamethoxazole-Trimethoprim] Swelling    Adhesive Tape Rash       HOME MEDICATIONS  Prior to Admission medications    Medication Sig Start Date End Date Taking? Authorizing Provider   insulin glargine (LANTUS SOLOSTAR) 100 UNIT/ML injection pen Inject 30 Units into the skin 2 times daily 4/22/22   Chet Shah MD   atorvastatin (LIPITOR) 40 MG tablet Take 1 tablet by mouth nightly 4/22/22 9/8/22  Chet Shah MD   acetaminophen (TYLENOL) 500 MG tablet Take 2 tablets by mouth 3 times daily as needed for Pain  Patient not taking: Reported on 8/8/2022 3/15/22   Chet Shah MD   fluticasone (FLONASE) 50 MCG/ACT nasal spray 1 spray by Each Nostril route daily 3/15/22   Chet Shah MD   metFORMIN (GLUCOPHAGE) 1000 MG tablet Take 1 tablet by mouth 2 times daily (with meals) 3/15/22   Chet Shah MD   mirtazapine (REMERON) 7.5 MG tablet Take 1 tablet by mouth nightly 3/15/22   Chet Sahh MD   traZODone (DESYREL) 150 MG tablet Take 1 tablet by mouth nightly 3/15/22   Chet Shah MD   venlafaxine (EFFEXOR XR) 150 MG extended release capsule Take 1 capsule by mouth daily (with breakfast) 3/15/22   Chet Shah MD   Alcohol Swabs 70 % PADS Use 1 swab 3 times daily as needed 3/15/22   Chet Shah MD   blood glucose monitor strips Test 3 times a day & as needed for symptoms of irregular blood glucose. Dispense sufficient amount for indicated testing frequency plus additional to accommodate PRN testing needs.  3/15/22   Chet Shah MD   Lancets MISC 1 each by Does not apply route 3 times daily 3/15/22   Chet Shah MD   Insulin Pen Needle ([employer] PEN NEEDLES 31G) 31G X 8 MM MISC 1 each by Does not apply route daily 3/15/22   Chet Shah MD   FREESTYLE LITE strip 1 each by Does not apply route 3 times daily 3/15/22   Chet Shah MD   budesonide-formoterol (SYMBICORT) 80-4.5 MCG/ACT AERO Inhale 2 puffs into the lungs 2 times daily 3/15/22   Puma Hai Knox MD   albuterol sulfate  (90 Base) MCG/ACT inhaler Inhale 2 puffs into the lungs every 4 hours as needed for Wheezing 3/15/22 4/15/22  Sky Darling MD   mupirocin (BACTROBAN) 2 % ointment Apply topically 3 times daily Apply topically to right foot wound two times a day for wound care. Apply to right foot wound, cover with Kerlix and ace wrap. Historical Provider, MD   Misc.  Devices MISC 1 PAIR OF DIABETIC SHOES (1 LEFT/ 1 RIGHT)  1-3 PAIRS OF INSERTS (LEFT/ RIGHT) 2/15/22   Kiera Benoit DPM   dicyclomine (BENTYL) 10 MG capsule Take 1 capsule by mouth 4 times daily 12/17/21   Sky Darling MD       CURRENT MEDICATIONS:  Current Facility-Administered Medications   Medication Dose Route Frequency Provider Last Rate Last Admin    pantoprazole (PROTONIX) 40 mg in sodium chloride (PF) 10 mL injection  40 mg IntraVENous BID Dillon Moeller MD   40 mg at 09/09/22 1129    dextrose 5 % and 0.45 % sodium chloride infusion   IntraVENous Continuous Mari Ca  mL/hr at 09/09/22 1539 New Bag at 09/09/22 1539    insulin lispro (HUMALOG) injection vial 0-4 Units  0-4 Units SubCUTAneous TID WC Dillon Moeller MD        insulin lispro (HUMALOG) injection vial 0-4 Units  0-4 Units SubCUTAneous Nightly Dillon Moeller MD        glucose chewable tablet 16 g  4 tablet Oral PRN Dillon Moeller MD        dextrose bolus 10% 125 mL  125 mL IntraVENous PRN Dillon Moeller MD        Or    dextrose bolus 10% 250 mL  250 mL IntraVENous PRN Dillon Moeller MD        glucagon (rDNA) injection 1 mg  1 mg SubCUTAneous PRN Dillon Moeller MD        dextrose 10 % infusion   IntraVENous Continuous PRN Dillon Moeller MD        glucose chewable tablet 16 g  4 tablet Oral PRN Carie Patel MD        dextrose bolus 10% 125 mL  125 mL IntraVENous PRN Carie Patel MD        Or    dextrose bolus 10% 250 mL  250 mL IntraVENous PRN Carie Patel MD        glucagon (rDNA) injection 1 mg  1 mg SubCUTAneous PRN Ahsan R Michael Nunez MD        dextrose 10 % infusion   IntraVENous Continuous PRN Meena Herring MD        albuterol sulfate HFA (PROVENTIL;VENTOLIN;PROAIR) 108 (90 Base) MCG/ACT inhaler 2 puff  2 puff Inhalation Q4H PRN Jose Macedo MD        atorvastatin (LIPITOR) tablet 40 mg  40 mg Oral Nightly Jose Macedo MD        budesonide-formoterol (SYMBICORT) 80-4.5 MCG/ACT inhaler 2 puff  2 puff Inhalation BID Hamida Paz MD        [Held by provider] insulin glargine (LANTUS) injection vial 30 Units  30 Units SubCUTAneous BID Jose Macedo MD        [Held by provider] metFORMIN (GLUCOPHAGE) tablet 1,000 mg  1,000 mg Oral BID WC Jose Macedo MD        mirtazapine (REMERON) tablet 7.5 mg  7.5 mg Oral Nightly Jose Macedo MD        traZODone (DESYREL) tablet 150 mg  150 mg Oral Nightly Jose Macedo MD        venlafaxine (EFFEXOR XR) extended release capsule 150 mg  150 mg Oral Daily with breakfast Jose Macedo MD        fluticasone (FLONASE) 50 MCG/ACT nasal spray 1 spray  1 spray Each Nostril Daily Jose Macedo MD   1 spray at 09/09/22 0900    dextrose bolus 10% 125 mL  125 mL IntraVENous PRN Jose Macedo MD        Or    dextrose bolus 10% 250 mL  250 mL IntraVENous PRN Jose Macedo MD        sodium phosphate 10 mmol in sodium chloride 0.9 % 250 mL IVPB  10 mmol IntraVENous PRN Hamida Paz MD   Stopped at 09/09/22 1120    Or    sodium phosphate 15 mmol in dextrose 5 % 250 mL IVPB  15 mmol IntraVENous PRN Jose Macedo MD 62.5 mL/hr at 09/09/22 1408 15 mmol at 09/09/22 1408    Or    sodium phosphate 20 mmol in dextrose 5 % 500 mL IVPB  20 mmol IntraVENous PRN Jose Macedo MD        polyethylene glycol (GLYCOLAX) packet 17 g  17 g Oral Daily PRN Jose Macedo MD        haloperidol lactate (HALDOL) injection 5 mg  5 mg IntraMUSCular Q6H PRN Jose Macedo MD   5 mg at 09/09/22 0046    norepinephrine (LEVOPHED) 16 mg in sodium chloride 0.9 % 250 mL infusion  1-100 mcg/min IntraVENous Continuous Shakir Marcelo MD             Objective: /69   Pulse (!) 112   Temp (P) 98.8 °F (37.1 °C) (Axillary)   Resp 22   Ht 5' 5\" (1.651 m)   Wt 180 lb 12.4 oz (82 kg)   LMP  (LMP Unknown)   SpO2 98%   BMI 30.08 kg/m²       LABS    CBC:   Lab Results   Component Value Date/Time    WBC 3.5 09/09/2022 04:41 AM    RBC 3.35 09/09/2022 04:41 AM    HGB 10.3 09/09/2022 04:41 AM     SED RATE:   Lab Results   Component Value Date    SEDRATE 61 (H) 01/30/2022       CMP:  Albumin:    Lab Results   Component Value Date/Time    LABALBU 4.0 09/08/2022 11:24 AM     PT/INR:    Lab Results   Component Value Date/Time    PROTIME 10.7 08/04/2022 12:21 AM    INR 1.0 08/04/2022 12:21 AM     HgBA1c:    Lab Results   Component Value Date/Time    LABA1C 11.4 08/04/2022 12:21 AM     PTT: No components found for: LABPTT      Assessment:       Marquez Risk Score: Marquez Scale Score: 13    Patient Active Problem List   Diagnosis Code    IDDM (insulin dependent diabetes mellitus) [id number]    Lupus (HCC) M32.9    Post traumatic stress disorder (PTSD) F43.10    Acute cystitis with hematuria N30.01    Hyperglycemia due to diabetes mellitus ([facility] E11.65    Suicidal ideation R45.851    Arthritis M19.90    Chronic back pain M54.9, G89.29    Acid reflux K21.9    History of stroke Z86.73    Polysubstance abuse ([facility]) F19.10    Cocaine abuse ([facility] F14.10    Chest pain R07.9    Acute non-recurrent maxillary sinusitis J01.00    Acute respiratory failure with hypercapnia (HCC) J96.02    Alcohol use disorder, severe, dependence (HCC) F10.20    Asthma exacerbation attacks J45.901    Bilateral edema of lower extremity R60.0    Bipolar 1 disorder, depressed, severe ([facility]) F31.4    BMI 34.0-34.9,adult Z68.34    Cannabis use disorder, severe, dependence (HCC) F12.20    Cocaine use disorder, severe, dependence ([facility]) F14.20    Dizziness R42    Dyslipidemia E78.5    Family history of colon cancer Z80.0    History of lupus [id number]    Homicidal ideation R45.850    Hypotension I95.9    Neuropathy G62.9    Absolute anemia D64.9    Recurrent depression ([facility]) F33.9    Recurrent major depressive disorder, in partial remission ([facility]) F33.41    Severe recurrent major depression with psychotic features ([facility] F33.3    Severe recurrent major depression without psychotic features ([facility] F33.2    Upper GI bleed K92.2    Vitamin D deficiency E55.9    Acute encephalopathy G93.40    Gastroesophageal reflux disease K21.9    Brain lesion G93.9    Rib lesion M89.9    Diabetes mellitus type 2 in obese ([facility]) E11.69, E66.9    YAEL (generalized anxiety disorder) F41.1    Posttraumatic stress disorder F43.10    Suicidal intent R45.851    Leg weakness, bilateral R29.898    Poorly controlled diabetes mellitus ([facility] E11.65    Felon of finger L03.019    Osteomyelitis ([facility]) M86.9    Recurrent falls R29.6    Seasonal allergic rhinitis J30.2    Fibromyalgia M79.7    Tenosynovitis of finger M65.9    Open wound of right index finger S61.200A    Adverse effect of COVID-19 vaccine T50. B95A    Wound dehiscence T81.30XA    Amputation finger S68.119A    Abscess of right index finger L02.511    Type 2 diabetes mellitus without complication, without long-term current use of insulin ([facility]) E11.9    Major depression F32.9    Right foot infection L08.9    Cellulitis and abscess of toe of right foot L03.031, L02.611    Abscess of right foot L02.611    Bilateral cellulitis of lower leg related to related to  uncontrolled diabetes L03.116, L03.115    Bipolar disorder, current episode mixed, unspecified ([facility]) F31.60    Right foot ulcer, with fat layer exposed ([facility] L97.512    Ulcer of left foot, with fat layer exposed ([facility]) L97.522    CRP elevated R79.82    Status post incision and drainage Z98.890    Intractable headache R51.9    Type 2 diabetes mellitus with diabetic autonomic neuropathy, with long-term current use of insulin ([facility]) E11.43, Z79.4    Somnolence R40.0    Cerebral infarction due to embolism of left middle cerebral artery ([facility]) F22.587 DKA, type 1, not at goal [facility]) E10.10    DKA, type 2, not at goal [facility]) E11.10    Uremic encephalopathy G93.49, N19         Measurements:  Wound 09/08/22 Abdomen fold (Active)   Wound Image   09/09/22 1704   Wound Etiology Other 09/09/22 1704   Dressing Status New dressing applied 09/09/22 1704   Wound Cleansed Soap and water 09/09/22 1704   Dressing/Treatment Triad hydro/zinc oxide-based hydrophilic paste 55/55/52 9176   Wound Assessment Pink/red 09/09/22 1704   Drainage Amount None 09/09/22 1704   Odor None 09/09/22 1704   Tamar-wound Assessment Fragile 09/09/22 1704   Margins Attached edges 09/09/22 1704   Wound Thickness Description not for Pressure Injury Partial thickness 09/09/22 0400   Number of days: 1       Wound 09/08/22 Buttocks (Active)   Wound Image   09/09/22 1704   Wound Etiology Other 09/09/22 1704   Dressing Status New dressing applied 09/09/22 1704   Wound Cleansed Soap and water 09/09/22 1704   Dressing/Treatment Triad hydro/zinc oxide-based hydrophilic paste 99/04/53 4967   Wound Assessment Pink/red 09/09/22 1704   Drainage Amount None 09/09/22 1704   Odor None 09/09/22 1704   Tamar-wound Assessment Fragile 09/09/22 1704   Margins Attached edges 09/09/22 1704   Number of days: 1       Wound 09/09/22 Breast Right (Active)   Wound Image   09/09/22 1704   Wound Etiology Other 09/09/22 1704   Dressing Status New dressing applied 09/09/22 1704   Wound Cleansed Soap and water 09/09/22 1704   Dressing/Treatment Triad hydro/zinc oxide-based hydrophilic paste 88/64/05 2797   Wound Assessment Pink/red 09/09/22 1704   Drainage Amount None 09/09/22 1704   Odor None 09/09/22 1704   Tamar-wound Assessment Fragile 09/09/22 1704   Margins Attached edges 09/09/22 1704   Number of days: 0       Wound 09/09/22 Breast Left (Active)   Wound Image   09/09/22 1704   Wound Etiology Other 09/09/22 1704   Dressing Status New dressing applied 09/09/22 1704   Wound Cleansed Soap and water 09/09/22 1704   Dressing/Treatment Triad hydro/zinc oxide-based hydrophilic paste 31/38/19 6366   Wound Assessment Pink/red 09/09/22 1704   Drainage Amount None 09/09/22 1704   Odor None 09/09/22 1704   Tamar-wound Assessment Fragile 09/09/22 1704   Margins Attached edges 09/09/22 1704   Number of days: 0       WOUND DESCRIPTION:   [address]  nurse consult for wounds to breast/abdominal folds, and buttock wound. Patient was admitted on 9/8 with DKA. Skin very moist and warm, patient clammy. She has a few areas of breakdown to her breast/abdominal folds. No drainage or redness, sweaty odor. Buttocks has an area of breakdown in the gluteal cleft. No drainage or redness. Recommend a thin layer of Triad cream under breasts and in abdominal folds, and to the buttocks. Response to treatment:  Well tolerated by patient. Plan:     Plan of Care:     Breast/abdominal folds, buttocks: Cleanse with soap and water, pat dry. Apply a thin layer of Triad cream twice daily and as needed    -Turn every 2 hours    -Float heels off of bed with pillows under calves. If needed- use offloading boots.    -Sacral foam dressing to sacrococcygeal area. Apply skin barrier wipe to skin first to help adherence. Peel back dressing to inspect skin beneath qshift, re-secure. Change every 72 hours and prn wrinkles, soilage. Discontinue if repeatedly soiled by incontinence.     -Routine incontinence care with foaming cleanser and zinc oxide cream. Apply zinc oxide cream twice daily and prn incontinence. Use moisture wicking under pad (one layer only). -Waffle mattress overlay. Check inflation each shift by sliding hand under the air overlay. Feel for the patient's heaviest/ most dependant body part. Ideally 1/2 to 1\" of air will be between your hand and the patient's body. Add air prn.     Specialty Bed Required : Yes   [] Low Air Loss   [x] Pressure Redistribution  [] Fluid Immersion  [] Bariatric  [] Total Pressure Relief  [] Other:     Current Diet: Diet NPO  Dietician consult:  Yes    Discharge Plan:  Placement for patient upon discharge: TBD  Patient appropriate for Outpatient [address]: N/A    Patient/Caregiver Teaching:  Level of patientunderstanding able to:     [] Indicates understanding       [] Needs reinforcement  [] Unsuccessful      [] Verbal Understanding  [] Demonstrated understanding       [x] No evidence of learning  [] Refused teaching         [] N/A       Electronically signed by Marion Arevalo RN on  9/9/2022 at 5:08 PM

## 2022-09-09 NOTE — OP NOTE
Operative Note      Patient: Madai Toro  YOB: 1965  MRN: [id number]              Preoperative diagnosis: diabetic ketoacidosis. On Levophed. Need for central venous access. Postoperative diagnosis: same    Procedure: right internal jugular vein triple-lumen central line insertion under ultrasound guidance    Surgeon: Dr. Osvaldo Malloy    Asst.: None    Anesthesia: Local    Preparation: Chloraprep    EBL: Less than 10 mL    Specimen: none    Procedure: procedure was performed at the bedside in the ICU on an urgent/emergent basis. Right side of the neck was prepped and draped in usual sterile fashion. Timeout was done. Right internal jugular vein was visualized with the SonoSite. Under ultrasound guidance using Seldinger technique right internal jugular vein was accessed without any difficulty. Guidewire was introduced. a dilator was placed over the guidewire. Dilator was removed. Triple-lumen catheter was inserted over the guidewire. Guidewire was removed. Catheter was secured. Ports were aspirated and flushed using saline solution without any difficulty. Sterile dressing was applied. Patient tolerated procedure fair.  -  Recommendations: stat portable chest x-ray.

## 2022-09-09 NOTE — PROGRESS NOTES
Department of Internal Medicine  Nephrology Trace Sin MD  Progress Note    Reason for consultation: Management of acute kidney injury. Consulting physician: Antonia Siu MD.    Interval history: Patient was seen and examined today in the intensive care unit where she remains confused and disoriented. Serum sodium is 117 mmol/L. We placed NG tube and got dark brown material back suspicious for GI bleed. She is hemodynamically stable and nonoliguric. History of present illness: This is a 56 y.o. female with a significant past medical history of Severe bipolar disorder, type 2 diabetes mellitus [insulin requiring], cannabis and cocaine abuse, systemic hypertensionFibromyalgia and s/p prior cerebrovascular accident, who was found unresponsive at home where she lives alone. She had apparently not been taking her insulin as her refrigerator was filled with unused insulin vials. Place was activated after family had not heard from patient for several days and when EMS arrived, patient was pretty confused and obtunded with severe hypotension and BP could not be obtained initially. Blood glucose was greater than 1000 mg/dL. Blood pressure in the emergency department was 86/64 mmHg with pulse 100 and respiratory rate 24/min. Laboratory studies were remarkable for hemoconcentration, serum potassium 5.5 mmol/L, blood glucose 1036 mg/dL, serum bicarbonate 19 mmol/L [anion gap 25 mmol/L] and elevated BUN/creatinine 129/3.56 mg/dL and hence nephrology consultation. At the time of this encounter, patient remained confused and appears quite dry and delirious to occasionally lucid.     Scheduled Meds:   atorvastatin  40 mg Oral Nightly    budesonide-formoterol  2 puff Inhalation BID    [Held by provider] insulin glargine  30 Units SubCUTAneous BID    [Held by provider] metFORMIN  1,000 mg Oral BID WC    mirtazapine  7.5 mg Oral Nightly    pantoprazole  40 mg Oral QAM AC    traZODone  150 mg Oral Nightly venlafaxine  150 mg Oral Daily with breakfast    fluticasone  1 spray Each Nostril Daily    heparin (porcine)  5,000 Units SubCUTAneous Q12H     Continuous Infusions:   sodium chloride 200 mL/hr at 22 0724    dextrose      insulin 0.0426 Units/kg/hr (22 0907)    dextrose 5 % and 0.45 % NaCl      norepinephrine       PRN Meds:.metoprolol, glucose, dextrose bolus **OR** dextrose bolus, glucagon (rDNA), dextrose, albuterol sulfate HFA, dextrose bolus **OR** dextrose bolus, sodium phosphate IVPB **OR** sodium phosphate IVPB **OR** sodium phosphate IVPB, polyethylene glycol, dextrose 5 % and 0.45 % NaCl, haloperidol lactate    Physical Exam:    VITALS:  /73   Pulse (!) 121   Temp 97.6 °F (36.4 °C) (Axillary)   Resp 23   Ht 5' 5\" (1.651 m)   Wt 180 lb 12.4 oz (82 kg)   LMP  (LMP Unknown)   SpO2 94%   BMI 30.08 kg/m²   TEMPERATURE:  Current - Temp: 97.6 °F (36.4 °C); Max - Temp  Av.6 °F (36.4 °C)  Min: 97.6 °F (36.4 °C)  Max: 97.7 °F (36.5 °C)  RESPIRATIONS RANGE: Resp  Av.3  Min: 14  Max: 31  PULSE RANGE: Pulse  Av.4  Min: 79  Max: 121  BLOOD PRESSURE RANGE:  Systolic (39APJ), KMZ:72 , Min:72 , PPR:029 ; Diastolic (23UDF), AQF:87, Min:24, Max:124  PULSE OXIMETRY RANGE: SpO2  Av.2 %  Min: 89 %  Max: 100 %  24HR INTAKE/OUTPUT:    Intake/Output Summary (Last 24 hours) at 2022 9922  Last data filed at 2022 3709  Gross per 24 hour   Intake 3676 ml   Output 950 ml   Net 2726 ml     Constitutional: delirious, distracted, flushed, and toxic    Skin: Skin color, texture, turgor normal. No rashes or lesions    Head: Normocephalic, without obvious abnormality, atraumatic     Cardiovascular/Edema: regular rate and rhythm, S1, S2 normal, no murmur, click, rub or gallop    Respiratory: Lungs: clear to auscultation bilaterally    Abdomen: soft, non-tender; bowel sounds normal; no masses,  no organomegaly    Back: symmetric, no curvature.  ROM normal. No CVA tenderness. Extremities: extremities normal, atraumatic, no cyanosis or edema; Amputation of the left fourth and fifth toe as well as fourth metatarsal.    Neuro:  Grossly normal    CBC:   Recent Labs     09/08/22  1124 09/09/22  0441   WBC 10.9 3.5   HGB 14.4 10.3*    261       BMP:    Recent Labs     09/08/22  2206 09/08/22  2300 09/09/22  0100 09/09/22  0200 09/09/22  0441   *  --  166*  --  170*   K 4.2  --  3.6*  --  4.2   *  --  134*  --  139*   CO2 21  --  19*  --  19*   *  --  112*  --  105*   CREATININE 3.14*  --  2.90*  --  2.62*   GLUCOSE 821*   < > 670* 589* 522*    < > = values in this interval not displayed. Lab Results   Component Value Date/Time    NITRU NEGATIVE 09/08/2022 04:45 PM    COLORU Yellow 09/08/2022 04:45 PM    PHUR 5.5 09/08/2022 04:45 PM    WBCUA 6 TO 9 09/08/2022 04:45 PM    RBCUA 0 TO 2 09/08/2022 04:45 PM    MUCUS NOT REPORTED 01/24/2022 12:04 PM    TRICHOMONAS NOT REPORTED 01/24/2022 12:04 PM    YEAST FEW 08/04/2022 02:30 PM    BACTERIA FEW 08/04/2022 02:30 PM    CLARITYU clear 07/12/2021 09:57 AM    SPECGRAV 1.015 09/08/2022 04:45 PM    LEUKOCYTESUR NEGATIVE 09/08/2022 04:45 PM    UROBILINOGEN Normal 09/08/2022 04:45 PM    BILIRUBINUR NEGATIVE  Verified by ictotest. 09/08/2022 04:45 PM    BILIRUBINUR negative 07/12/2021 09:57 AM    BLOODU negative 07/12/2021 09:57 AM    GLUCOSEU 2+ 09/08/2022 04:45 PM    KETUA SMALL 09/08/2022 04:45 PM    AMORPHOUS NOT REPORTED 01/24/2022 12:04 PM     Urine Sodium:     Lab Results   Component Value Date/Time    EMILIO <20 09/08/2022 05:28 PM     Urine Protein:     Lab Results   Component Value Date/Time    TPU 13 07/08/2020 11:19 AM     Urine Creatinine:     Lab Results   Component Value Date/Time    LABCREA 121.0 09/08/2022 05:28 PM     IMPRESSION/RECOMMENDATIONS:      1.   Acute kidney injury - most consistent with prerenal azotemia secondary to dehydration from osmotic diuresis as well as ischemic acute tubular necrosis secondary to hypotension. Renal function is improving. There are medications for dialysis  Plan:Change IV fluid to 0.45 normal saline at 150 mill per hour. s. Strict urine output documentation. Basic metabolic profile daily. 2.  Diabetic ketoacidosis - secondary to poor compliance. Will need to rule out sepsis. Continue DKA protocol as amended    3. Hypotension - secondary to dehydration induced by osmotic diuresis. Hemodynamic profile is improving    4. Hypernatremia - estimated free water deficit 10 L. When corrected for hyperglycemia, serum sodium is actually much higher. NG tube was placed for free water administration but will place this on hold for now. Continue half-normal saline at 200 ml/hr. 5.  Hypophosphatemia - secondary to insulin. We will replace with sodium phosphate IV. Prognosis is guarded.     Maribell Dorantes MD FACP  Attending Nephrologist  9/9/2022 9:29 AM

## 2022-09-09 NOTE — PROGRESS NOTES
Got medical update from Gage Silverio     09/09/22 1910   Encounter Summary   Encounter Overview/Reason  Spiritual/Emotional Needs   Service Provided For: Patient   Referral/Consult From: Rounding   Complexity of Encounter Low   Spiritual/Emotional needs   Type Spiritual Support   Assessment/Intervention/Outcome   Assessment Unable to assess   Intervention Prayer (assurance of)/[place]

## 2022-09-09 NOTE — PROGRESS NOTES
Spoke to Zaida NP regarding dark brown fluid in patients NG tube, would like to keep on low intermittent suction and for RN to call Dr. Kriss Butterfield and ask if he wants to start water flushes through NG.

## 2022-09-09 NOTE — CONSULTS
General Surgery Consult      Pt Name: Gladys Staton  MRN: [id number]  YOB: 1965  Date of evaluation: 9/8/2022  Primary Care Physician: Jessica Dudley MD   Patient evaluated at the request of  Dr. Orlin Canales  Reason for evaluation: Central line placement    SUBJECTIVE:   History of Chief Complaint:    Gladys Staton is a 56 y.o. female who presents with diabetic ketoacidosis. patient was on Levophed. Need for central line access. Patient is quite agitated. History of drug abuse. Past Medical History   has a past medical history of Acid reflux, Acute cystitis with hematuria, Acute non-recurrent maxillary sinusitis, Asthma, Bipolar 1 disorder ([address] Bipolar disorder, mixed ([address] BMI 34.0-34.9,adult, Cannabis use disorder, severe, dependence ([address] Cerebrovascular accident (CVA) ([address] Chest pain, Chronic renal insufficiency, Cocaine abuse ([address] COVID-19 virus RNA test result unknown, DDD (degenerative disc disease), cervical, Diabetes mellitus ([address] Dizziness, Fibromyalgia, History of stroke, Homicidal ideation, Hyperglycemia, Hypertension, Hypotension, IDDM (insulin dependent diabetes mellitus), Lupus ([address] Migraine, Neuropathy, Neuropathy, Polysubstance abuse ([address] Post traumatic stress disorder (PTSD), Posttraumatic stress disorder, Recurrent depression ([address] Recurrent major depressive disorder, in partial remission ([address] Screening mammogram, encounter for, Severe recurrent major depression with psychotic features ([address] Severe recurrent major depression without psychotic features ([address] Stroke (cerebrum) ([address] Stroke ([address] Suicidal ideation, Suicidal intent, Vitamin D deficiency, and White matter changes. Past Surgical History   has a past surgical history that includes Abscess Drainage; chest tube insertion; Abdomen surgery; Cataract removal with implant (Bilateral); LASIK (Bilateral); Finger amputation (Left, 03/13/2021); Hand surgery (Right, 09/14/2021);  Hand surgery (Right, 09/15/2021); Finger amputation (Right, 10/11/2021); Foot surgery (Right, 01/27/2022); Foot Debridement (Right, 1/27/2022); and Insert Midline Catheter (2/3/2022). Medications  Prior to Admission medications    Medication Sig Start Date End Date Taking? Authorizing Provider   insulin glargine (LANTUS SOLOSTAR) 100 UNIT/ML injection pen Inject 30 Units into the skin 2 times daily 4/22/22   Severa Knee, MD   atorvastatin (LIPITOR) 40 MG tablet Take 1 tablet by mouth nightly 4/22/22 9/8/22  Severa Knee, MD   acetaminophen (TYLENOL) 500 MG tablet Take 2 tablets by mouth 3 times daily as needed for Pain  Patient not taking: Reported on 8/8/2022 3/15/22   Severa Knee, MD   fluticasone (FLONASE) 50 MCG/ACT nasal spray 1 spray by Each Nostril route daily 3/15/22   Severa Knee, MD   metFORMIN (GLUCOPHAGE) 1000 MG tablet Take 1 tablet by mouth 2 times daily (with meals) 3/15/22   Severa Knee, MD   mirtazapine (REMERON) 7.5 MG tablet Take 1 tablet by mouth nightly 3/15/22   Severa Knee, MD   traZODone (DESYREL) 150 MG tablet Take 1 tablet by mouth nightly 3/15/22   Severa Knee, MD   venlafaxine (EFFEXOR XR) 150 MG extended release capsule Take 1 capsule by mouth daily (with breakfast) 3/15/22   Severa Knee, MD   Alcohol Swabs 70 % PADS Use 1 swab 3 times daily as needed 3/15/22   Severa Knee, MD   blood glucose monitor strips Test 3 times a day & as needed for symptoms of irregular blood glucose. Dispense sufficient amount for indicated testing frequency plus additional to accommodate PRN testing needs.  3/15/22   Severa Knee, MD   Lancets MISC 1 each by Does not apply route 3 times daily 3/15/22   Severa Knee, MD   Insulin Pen Needle ([employer] PEN NEEDLES 31G) 31G X 8 MM MISC 1 each by Does not apply route daily 3/15/22   Severa Knee, MD   FREESTYLE LITE strip 1 each by Does not apply route 3 times daily 3/15/22   Severa Knee, MD   budesonide-formoterol (SYMBICORT) 80-4.5 MCG/ACT AERO Inhale 2 puffs into the lungs 2 times daily 3/15/22   Aidee Joya MD   albuterol sulfate  (90 Base) MCG/ACT inhaler Inhale 2 puffs into the lungs every 4 hours as needed for Wheezing 3/15/22 4/15/22  Aidee Joya MD   mupirocin (BACTROBAN) 2 % ointment Apply topically 3 times daily Apply topically to right foot wound two times a day for wound care. Apply to right foot wound, cover with Kerlix and ace wrap. Historical Provider, MD   Misc. Devices MISC 1 PAIR OF DIABETIC SHOES (1 LEFT/ 1 RIGHT)  1-3 PAIRS OF INSERTS (LEFT/ RIGHT) 2/15/22   Sandip Ribeiro DPM   dicyclomine (BENTYL) 10 MG capsule Take 1 capsule by mouth 4 times daily 12/17/21   Aidee Joya MD     Allergies  is allergic to bactrim [sulfamethoxazole-trimethoprim] and adhesive tape. Family History  family history includes Diabetes in her brother, father, mother, and sister; No Known Problems in her sister; Other in her son; Stroke in her mother. Social History   reports that she has never smoked. She has never used smokeless tobacco. She reports that she does not currently use alcohol. She reports current drug use. Drugs: Marijuana (Weed) and Cocaine. Review of Systems:patient is unable to give detailed history. Review of system per chart. OBJECTIVE:   VITALS:  weight is 210 lb (95.3 kg). Her blood pressure is 81/51 (abnormal) and her pulse is 102 (abnormal). Her respiration is 18 and oxygen saturation is 89% (abnormal). CONSTITUTIONAL: agitated. Not answering questions. SKIN: Skin color, texture, turgor normal. No rashes or lesions. LYMPH: no cervical nodes, no inguinal nodes  HEENT: Head is normocephalic, atraumatic.  EOMI, PERRLA  NECK: Supple, symmetrical, trachea midline, no adenopathy, thyroid symmetric, not enlarged and no tenderness, skin normal  CHEST/LUNGS: chest symmetric with normal A/P diameter, normal respiratory rate and rhythm, lungs clear to auscultation without wheezes, rales or rhonchi. No accessory muscle use. Scars None   CARDIOVASCULAR: Heart regular rate and rhythm Normal S1 and S2. . Carotid and femoral pulses 2+/4 and equal bilaterally  ABDOMEN: soft abdomen nondistended nontender  RECTAL: deferred, not clinically indicated  NEUROLOGIC: Deferred  EXTREMITIES: no cyanosis, no clubbing, and no edema    LABS:   CBC with Differential:    Lab Results   Component Value Date/Time    WBC 10.9 09/08/2022 11:24 AM    RBC 4.54 09/08/2022 11:24 AM    HGB 14.4 09/08/2022 11:24 AM    HCT 46.7 09/08/2022 11:24 AM     09/08/2022 11:24 AM    .8 09/08/2022 11:24 AM    MCH 31.6 09/08/2022 11:24 AM    MCHC 30.8 09/08/2022 11:24 AM    RDW 14.3 09/08/2022 11:24 AM    NRBC 2 09/20/2021 10:29 AM    LYMPHOPCT 6 09/08/2022 11:24 AM    MONOPCT 8 09/08/2022 11:24 AM    BASOPCT 0 09/08/2022 11:24 AM    MONOSABS 0.90 09/08/2022 11:24 AM    LYMPHSABS 0.70 09/08/2022 11:24 AM    EOSABS 0.00 09/08/2022 11:24 AM    BASOSABS 0.00 09/08/2022 11:24 AM    DIFFTYPE NOT REPORTED 02/08/2022 06:58 AM     BMP:    Lab Results   Component Value Date/Time     09/08/2022 05:17 PM    K 4.4 09/08/2022 05:17 PM     09/08/2022 05:17 PM    CO2 17 09/08/2022 05:17 PM     09/08/2022 05:17 PM    LABALBU 4.0 09/08/2022 11:24 AM    CREATININE 3.33 09/08/2022 05:17 PM    CALCIUM 9.1 09/08/2022 05:17 PM    GFRAA 17 09/08/2022 05:17 PM    LABGLOM 14 09/08/2022 05:17 PM    GLUCOSE 946 09/08/2022 06:58 PM     Hepatic Function Panel:    Lab Results   Component Value Date/Time    ALKPHOS 151 09/08/2022 11:24 AM    ALT 15 09/08/2022 11:24 AM    AST 11 09/08/2022 11:24 AM    PROT 8.9 09/08/2022 11:24 AM    BILITOT 0.4 09/08/2022 11:24 AM    BILIDIR <0.08 10/08/2021 04:28 PM    IBILI CANNOT BE CALCULATED 10/08/2021 04:28 PM    LABALBU 4.0 09/08/2022 11:24 AM     Calcium:    Lab Results   Component Value Date/Time    CALCIUM 9.1 09/08/2022 05:17 PM     Magnesium:    Lab Results   Component Value Date/Time    MG 3.0 09/08/2022 05:17 PM     Phosphorus:    Lab Results   Component Value Date/Time    PHOS 5.4 09/08/2022 05:17 PM     PT/INR:    Lab Results   Component Value Date/Time    PROTIME 10.7 08/04/2022 12:21 AM    INR 1.0 08/04/2022 12:21 AM     ABG:  No results found for: PHART, PH, DIM1XIB, PCO2, PO2ART, PO2, AAE8ZYB, HCO3, BEART, BE, THGBART, THB, OMR2ZMI, Y8NWMYUJ, O2SAT  Urine Culture:  No components found for: CURINE  Blood Culture:  No components found for: CBLOOD, CFUNGUSBL  Stool Culture:  No components found for: CSTOOL    RADIOLOGY:   I have personally reviewed the following films:  CT HEAD WO CONTRAST    Result Date: 9/8/2022  EXAMINATION: CT OF THE HEAD WITHOUT CONTRAST  9/8/2022 11:38 am TECHNIQUE: CT of the head was performed without the administration of intravenous contrast. Automated exposure control, iterative reconstruction, and/or weight based adjustment of the mA/kV was utilized to reduce the radiation dose to as low as reasonably achievable. COMPARISON: MRI 08/04/2022, 08/03/2022 and CT 08/03/2022. HISTORY: ORDERING SYSTEM PROVIDED HISTORY: Mental Status Changes TECHNOLOGIST PROVIDED HISTORY: Mental Status Changes Decision Support Exception - unselect if not a suspected or confirmed emergency medical condition->Emergency Medical Condition (MA) Is the patient pregnant?->No Reason for Exam: AMS Additional signs and symptoms: AMS FINDINGS: BRAIN/VENTRICLES: There is no acute intracranial hemorrhage, mass effect or midline shift. No abnormal extra-axial fluid collection. Encephalomalacia in the left frontal lobe demonstrated corresponding to recently demonstrated ischemia. Cortical atrophy and chronic white matter changes in the brain and associated ventricular enlargement are again demonstrated. ORBITS: The visualized portion of the orbits demonstrate no acute abnormality. SINUSES: The visualized paranasal sinuses and mastoid air cells demonstrate no acute abnormality.  SOFT TISSUES/SKULL:  No acute abnormality of the visualized skull or soft tissues. Chronic findings in the brain without acute CT abnormality identified. Expected evolution of recently demonstrated ischemia in the left frontal lobe. US RETROPERITONEAL COMPLETE    Result Date: 9/8/2022  EXAMINATION: RETROPERITONEAL ULTRASOUND OF THE KIDNEYS AND URINARY BLADDER 9/8/2022 COMPARISON: None HISTORY: ORDERING SYSTEM PROVIDED HISTORY: Acute kidney injury TECHNOLOGIST PROVIDED HISTORY: Acute kidney injury 46-year-old female with acute kidney injury FINDINGS: Kidneys: Right kidney measures 10.4 x 4.3 x 5.0 cm. Right renal cortical thickness measures 1.7 cm. Left kidney measures 9.4 x 4.8 x 4.8 cm. Left renal cortical thickness measures 1.8 cm. No hydronephrosis or perinephric fluid. No echogenic foci with posterior acoustic shadowing to suggest renal calculi. Gross preservation of the bilateral corticomedullary differentiation. Bladder: Not imaged. Unremarkable renal ultrasound. No hydronephrosis. IMPRESSION:   Diabetic ketoacidosis. On Levophed need for central line. History of cocaine abuse. does not have any pertinent problems on file. PLAN:   I will proceed with central line placement at the bedside as requested. Patient needs a line for pressors. There is no family available to obtain consent. I will proceed with this central line placement on an emergent/urgent basis. Thank you for this interesting consult and for allowing us to participate in the care of this patient. If you have any questions please don't hesitate to call.           Electronically signed by Tyrel Dennis MD  on 9/8/2022 at 8:29 PM

## 2022-09-09 NOTE — PLAN OF CARE
Problem: Discharge Planning  Goal: Discharge to home or other facility with appropriate resources  Outcome: Progressing     Problem: Pain  Goal: Verbalizes/displays adequate comfort level or baseline comfort level  Outcome: Progressing     Problem: Skin/Tissue Integrity  Goal: Absence of new skin breakdown  Description: 1. Monitor for areas of redness and/or skin breakdown  2. Assess vascular access sites hourly  3. Every 4-6 hours minimum:  Change oxygen saturation probe site  4. Every 4-6 hours:  If on nasal continuous positive airway pressure, respiratory therapy assess nares and determine need for appliance change or resting period.   Outcome: Progressing     Problem: ABCDS Injury Assessment  Goal: Absence of physical injury  Outcome: Progressing     Problem: Safety - Adult  Goal: Free from fall injury  Outcome: Progressing     Problem: Neurosensory - Adult  Goal: Achieves stable or improved neurological status  Outcome: Progressing  Goal: Absence of seizures  Outcome: Progressing  Goal: Remains free of injury related to seizures activity  Outcome: Progressing  Goal: Achieves maximal functionality and self care  Outcome: Progressing     Problem: Cardiovascular - Adult  Goal: Maintains optimal cardiac output and hemodynamic stability  Outcome: Progressing  Goal: Absence of cardiac dysrhythmias or at baseline  Outcome: Progressing     Problem: Skin/Tissue Integrity - Adult  Goal: Skin integrity remains intact  Outcome: Progressing  Goal: Incisions, wounds, or drain sites healing without S/S of infection  Outcome: Progressing  Goal: Oral mucous membranes remain intact  Outcome: Progressing     Problem: Musculoskeletal - Adult  Goal: Return mobility to safest level of function  Outcome: Progressing  Goal: Return ADL status to a safe level of function  Outcome: Progressing     Problem: Gastrointestinal - Adult  Goal: Minimal or absence of nausea and vomiting  Outcome: Progressing  Goal: Maintains or returns to baseline bowel function  Outcome: Progressing  Goal: Maintains adequate nutritional intake  Outcome: Progressing     Problem: Genitourinary - Adult  Goal: Absence of urinary retention  Outcome: Progressing  Goal: Urinary catheter remains patent  Outcome: Progressing     Problem: Infection - Adult  Goal: Absence of infection at discharge  Outcome: Progressing  Goal: Absence of infection during hospitalization  Outcome: Progressing     Problem: Metabolic/Fluid and Electrolytes - Adult  Goal: Electrolytes maintained within normal limits  Outcome: Progressing  Goal: Hemodynamic stability and optimal renal function maintained  Outcome: Progressing  Goal: Glucose maintained within prescribed range  Outcome: Progressing     Problem: Hematologic - Adult  Goal: Maintains hematologic stability  Outcome: Progressing     Problem: Safety - Medical Restraint  Goal: Remains free of injury from restraints (Restraint for Interference with Medical Device)  Description: INTERVENTIONS:  1. Determine that other, less restrictive measures have been tried or would not be effective before applying the restraint  2. Evaluate the patient's condition at the time of restraint application  3. Inform patient/family regarding the reason for restraint  4.  Q2H: Monitor safety, psychosocial status, comfort, nutrition and hydration  Outcome: Progressing     Problem: Chronic Conditions and Co-morbidities  Goal: Patient's chronic conditions and co-morbidity symptoms are monitored and maintained or improved  Outcome: Progressing

## 2022-09-09 NOTE — CONSULTS
[facility]   Consult Note                 Date:   9/9/2022  Date of admission:  9/8/2022 11:01 AM  MRN:   [id number]  YOB: 1965    Reason for Consult:  NSVT     HISTORY OF PRESENT ILLNESS:    The patient is a 56 y.o.  female who is admitted to the hospital for mental status changes. At this time patient is not answering all the questions only answering few denies any chest pain pressure heart fluttering denies any history of heart problem  Patient positive for cocaine  Patient was also fine to had a hyperglycemia electrolyte imbalance  Last night patient had nonsustained VT not anymore  At this time blood pressure is fairly controlled    Past Medical History:   has a past medical history of Acid reflux, Acute cystitis with hematuria, Acute non-recurrent maxillary sinusitis, Asthma, Bipolar 1 disorder ([address] Bipolar disorder, mixed ([address] BMI 34.0-34.9,adult, Cannabis use disorder, severe, dependence ([address] Cerebrovascular accident (CVA) ([address] Chest pain, Chronic renal insufficiency, Cocaine abuse ([address] COVID-19 virus RNA test result unknown, DDD (degenerative disc disease), cervical, Diabetes mellitus ([address] Dizziness, Fibromyalgia, History of stroke, Homicidal ideation, Hyperglycemia, Hypertension, Hypotension, IDDM (insulin dependent diabetes mellitus), Lupus ([address] Migraine, Neuropathy, Neuropathy, Polysubstance abuse ([address] Post traumatic stress disorder (PTSD), Posttraumatic stress disorder, Recurrent depression ([address] Recurrent major depressive disorder, in partial remission ([address] Screening mammogram, encounter for, Severe recurrent major depression with psychotic features ([address] Severe recurrent major depression without psychotic features ([address] Stroke (cerebrum) ([address] Stroke ([address] Suicidal ideation, Suicidal intent, Vitamin D deficiency, and White matter changes.     Past Surgical History:   has a past surgical history that includes Abscess Drainage; chest tube insertion; Abdomen surgery; Cataract removal with implant (Bilateral); LASIK (Bilateral); Finger amputation (Left, 03/13/2021); Hand surgery (Right, 09/14/2021); Hand surgery (Right, 09/15/2021); Finger amputation (Right, 10/11/2021); Foot surgery (Right, 01/27/2022); Foot Debridement (Right, 1/27/2022); and Insert Midline Catheter (2/3/2022). Home Medications:    Prior to Admission medications    Medication Sig Start Date End Date Taking? Authorizing Provider   insulin glargine (LANTUS SOLOSTAR) 100 UNIT/ML injection pen Inject 30 Units into the skin 2 times daily 4/22/22   Aidee Joya MD   atorvastatin (LIPITOR) 40 MG tablet Take 1 tablet by mouth nightly 4/22/22 9/8/22  Aidee Joya MD   acetaminophen (TYLENOL) 500 MG tablet Take 2 tablets by mouth 3 times daily as needed for Pain  Patient not taking: Reported on 8/8/2022 3/15/22   Aidee Joya MD   fluticasone (FLONASE) 50 MCG/ACT nasal spray 1 spray by Each Nostril route daily 3/15/22   Aidee Joya MD   metFORMIN (GLUCOPHAGE) 1000 MG tablet Take 1 tablet by mouth 2 times daily (with meals) 3/15/22   Aidee Joya MD   mirtazapine (REMERON) 7.5 MG tablet Take 1 tablet by mouth nightly 3/15/22   Aidee Joya MD   traZODone (DESYREL) 150 MG tablet Take 1 tablet by mouth nightly 3/15/22   Aidee Joya MD   venlafaxine (EFFEXOR XR) 150 MG extended release capsule Take 1 capsule by mouth daily (with breakfast) 3/15/22   Aidee Joya MD   Alcohol Swabs 70 % PADS Use 1 swab 3 times daily as needed 3/15/22   Aidee Joya MD   blood glucose monitor strips Test 3 times a day & as needed for symptoms of irregular blood glucose. Dispense sufficient amount for indicated testing frequency plus additional to accommodate PRN testing needs.  3/15/22   Aidee Joya MD   Lancets MISC 1 each by Does not apply route 3 times daily 3/15/22   Aidee Joya MD   Insulin Pen Needle ([employer] PEN NEEDLES 31G) 31G X 8 MM MISC 1 each by Does not apply route daily 3/15/22   Cindy Summers MD   FREESTYLE LITE strip 1 each by Does not apply route 3 times daily 3/15/22   Cindy Summers MD   budesonide-formoterol (SYMBICORT) 80-4.5 MCG/ACT AERO Inhale 2 puffs into the lungs 2 times daily 3/15/22   Cindy Summers MD   albuterol sulfate  (90 Base) MCG/ACT inhaler Inhale 2 puffs into the lungs every 4 hours as needed for Wheezing 3/15/22 4/15/22  Cindy Summers MD   mupirocin (BACTROBAN) 2 % ointment Apply topically 3 times daily Apply topically to right foot wound two times a day for wound care. Apply to right foot wound, cover with Kerlix and ace wrap. Historical Provider, MD   Misc.  Devices MISC 1 PAIR OF DIABETIC SHOES (1 LEFT/ 1 RIGHT)  1-3 PAIRS OF INSERTS (LEFT/ RIGHT) 2/15/22   Juan Manuel Harris DPM   dicyclomine (BENTYL) 10 MG capsule Take 1 capsule by mouth 4 times daily 12/17/21   Cindy Summers MD       Current Medications: Scheduled Meds:   atorvastatin  40 mg Oral Nightly    budesonide-formoterol  2 puff Inhalation BID    [Held by provider] insulin glargine  30 Units SubCUTAneous BID    [Held by provider] metFORMIN  1,000 mg Oral BID WC    mirtazapine  7.5 mg Oral Nightly    pantoprazole  40 mg Oral QAM AC    traZODone  150 mg Oral Nightly    venlafaxine  150 mg Oral Daily with breakfast    fluticasone  1 spray Each Nostril Daily    heparin (porcine)  5,000 Units SubCUTAneous Q12H     Continuous Infusions:   sodium chloride 200 mL/hr at 09/09/22 0724    dextrose      insulin 0.0426 Units/kg/hr (09/09/22 0907)    dextrose 5 % and 0.45 % NaCl      norepinephrine       PRN Meds:.metoprolol, glucose, dextrose bolus **OR** dextrose bolus, glucagon (rDNA), dextrose, albuterol sulfate HFA, dextrose bolus **OR** dextrose bolus, sodium phosphate IVPB **OR** sodium phosphate IVPB **OR** sodium phosphate IVPB, polyethylene glycol, dextrose 5 % and 0.45 % NaCl, haloperidol lactate     Allergies:  Bactrim [sulfamethoxazole-trimethoprim] and Adhesive tape    Social History:   reports that she has never smoked. She has never used smokeless tobacco. She reports that she does not currently use alcohol. She reports current drug use. Drugs: Marijuana (Weed) and Cocaine. Family History: family history includes Diabetes in her brother, father, mother, and sister; No Known Problems in her sister; Other in her son; Stroke in her mother. Review of Systems   CONSTITUTIONAL:  negative for  fatigue and malaise    EYES:  negative for discharge    HEENT:  negative for epistaxis and sore throat    RESPIRATORY:  negative for cough, shortness of breath, wheezing    CARDIOVASCULAR:  negative for chest pain, palpitations, syncope, edema    GASTROINTESTINAL:  negative for nausea, vomiting, diarrhea, constipation, abdominal pain    GENITOURINARY:  negative for incontinence    MUSCULOSKELETAL:  negative for neck or back pain    NEUROLOGICAL:  negative for headaches, seizures and double vision   PSYCHIATRIC:  negative               PHYSICAL EXAM:    Blood pressure (!) 96/54, pulse (!) 101, temperature 97.6 °F (36.4 °C), temperature source Axillary, resp. rate 28, height 5' 5\" (1.651 m), weight 180 lb 12.4 oz (82 kg), SpO2 (!) 88 %. Sleepy    HEAD: normocephalic, atraumatic   EYES: PERRLA, EOMI   ENT: moist mucous membranes, uvula midline   NECK:  symmetric, no midline tenderness to palpation   LUNGS: clear to auscultation bilaterally   CARDIOVASCULAR: regular rate and rhythm, no murmurs, rubs or gallops   ABDOMEN: Soft, non-tender, non-distended with normal active bowel sounds   SKIN: no rash       DATA:    ECG:NSR nonspecific ST changes, QTC 477ms  Echo:  Stress:10/30/20  Impression   1. No discrete perfusion abnormality to suggest myocardial   ischemia/infarction. 2. No wall motion abnormality. Calculated ejection fraction of 57%.    3. Risk stratification: Low     Cath:    Labs:     CBC:   Recent Labs     09/08/22  1124 09/09/22  0441   WBC 10.9 3.5   HGB 14.4 10.3*   HCT 46.7* 32.3*    261     BMP:   Recent Labs     09/09/22  0100 09/09/22  0200 09/09/22  0441   *  --  170*   K 3.6*  --  4.2   CO2 19*  --  19*   *  --  105*   CREATININE 2.90*  --  2.62*   LABGLOM 17*  --  19*   GLUCOSE 670* 589* 522*     BNP: No results for input(s): BNP in the last 72 hours. PT/INR: No results for input(s): PROTIME, INR in the last 72 hours. APTT:No results for input(s): APTT in the last 72 hours.   CARDIAC ENZYMES:  Recent Labs     09/08/22  1416   CKTOTAL 203*     FASTING LIPID PANEL:  Lab Results   Component Value Date/Time    HDL 40 08/04/2022 06:02 AM    TRIG 119 08/04/2022 06:02 AM     LIVER PROFILE:  Recent Labs     09/08/22  1124   AST 11   ALT 15   LABALBU 4.0       Intake/Output Summary (Last 24 hours) at 9/9/2022 0930  Last data filed at 9/9/2022 2933  Gross per 24 hour   Intake 3676 ml   Output 950 ml   Net 2726 ml       IMPRESSION:    Patient Active Problem List   Diagnosis    IDDM (insulin dependent diabetes mellitus)    Lupus (HCC)    Post traumatic stress disorder (PTSD)    Acute cystitis with hematuria    Hyperglycemia due to diabetes mellitus (HCC)    Suicidal ideation    Arthritis    Chronic back pain    Acid reflux    History of stroke    Polysubstance abuse (HCC)    Cocaine abuse ([address]    Chest pain    Acute non-recurrent maxillary sinusitis    Acute respiratory failure with hypercapnia (HCC)    Alcohol use disorder, severe, dependence (HCC)    Asthma exacerbation attacks    Bilateral edema of lower extremity    Bipolar 1 disorder, depressed, severe (HCC)    BMI 34.0-34.9,adult    Cannabis use disorder, severe, dependence (HCC)    Cocaine use disorder, severe, dependence ([address]    Dizziness    Dyslipidemia    Family history of colon cancer    History of lupus    Homicidal ideation    Hypotension    Neuropathy    Absolute anemia    Recurrent depression (HCC)    Recurrent major depressive disorder, in partial remission (HCC)    Severe recurrent major depression with psychotic features ([address]    Severe recurrent major depression without psychotic features ([address]    Upper GI bleed    Vitamin D deficiency    Acute encephalopathy    Gastroesophageal reflux disease    Brain lesion    Rib lesion    Diabetes mellitus type 2 in obese (HCC)    YAEL (generalized anxiety disorder)    Posttraumatic stress disorder    Suicidal intent    Leg weakness, bilateral    Poorly controlled diabetes mellitus (HCC)    Felon of finger    Osteomyelitis (HCC)    Recurrent falls    Seasonal allergic rhinitis    Fibromyalgia    Tenosynovitis of finger    Open wound of right index finger    Adverse effect of COVID-19 vaccine    Wound dehiscence    Amputation finger    Abscess of right index finger    Type 2 diabetes mellitus without complication, without long-term current use of insulin (HCC)    Major depression    Right foot infection    Cellulitis and abscess of toe of right foot    Abscess of right foot    Bilateral cellulitis of lower leg related to related to  uncontrolled diabetes    Bipolar disorder, current episode mixed, unspecified ([address]    Right foot ulcer, with fat layer exposed ([address]    Ulcer of left foot, with fat layer exposed ([address]    CRP elevated    Status post incision and drainage    Intractable headache    Type 2 diabetes mellitus with diabetic autonomic neuropathy, with long-term current use of insulin (HCC)    Somnolence    Cerebral infarction due to embolism of left middle cerebral artery (HCC)    DKA, type 1, not at goal [facility])    DKA, type 2, not at goal [facility])           RECOMMENDATIONS:  Nonsustained VT no more since last night  Most likely secondary to electrolyte symptom balance ,metabolic derangement, cocaine  Continue manage hyper glycemia  Keep electrolytes within normal limits  Cocaine abuse, avoid beta-blockers because of unopposed alpha  We will get echocardiogram to rule out any structural heart disease      Discussed with patient and nursing.     Che Payne MD, MD  [facility]        [phone]

## 2022-09-09 NOTE — PROGRESS NOTES
Spoke with Dr. Hawa Jacobo concerning lab checks for sodiu.  Relayed critical sodium level, and chloride, New orders for sodium to be checked every 6hrs and to decrease fluids to 150mL/hr and to call if sodium is less than 160

## 2022-09-09 NOTE — PROGRESS NOTES
Central and functioning well per nursing staff. Continue medical management. I will sign of the case. Please call me as needed.

## 2022-09-09 NOTE — CONSULTS
[address]                 [address] [facility], [address]                                  CONSULTATION    PATIENT NAME: Hamilton Rodney                   :        1965  MED REC NO:   [id number]                              ROOM:       2007  ACCOUNT NO:   [de-identified]                           ADMIT DATE: 2022  PROVIDER:     Jeimy Vences    CONSULT DATE:  2022    REASON FOR CONSULTATION:  Hyponatremia, hyperglycemia, hyperkalemia,  elevated anion gap metabolic acidosis. HISTORY OF PRESENT ILLNESS:  The patient is a 61-year-old female. Reportedly, the patient has insulin-requiring diabetes, bipolar  disorder. She had mentioned hypertension, history of CVA, history of  acute marijuana and cocaine use. Reportedly, she was found  unresponsive. EMS arrived. The patient was confused and obtunded with  severe hypotension. The patient was given IV fluids in the ER and I was  told that the patient's blood pressure improved. Chest x-ray was  unremarkable. Lab work were notable for a sodium 161, potassium 5.5,  chloride 117, bicarb 19 with anion gap of 25. BUN and creatinine  markedly elevated at 129/3. 56. Beta-hydroxybutyrate 5.47, glucose 1034. Nephrology was consulted. The patient's IV fluids are being run by  Nephrology in light of the patient's dehydration and the hypernatremia. Venous blood gas 7.19, pCO2 of 40, pO2 of 32. The patient was  transferred to the ICU. We had to start the patient on Levophed because  of MAP was 40. PAST MEDICAL HISTORY:  Notable for history of bipolar 1 disorder,  history of CVA in 2017, history of chronic renal sufficiency, history  of cocaine abuse in the past, history of hypertension, posttraumatic  stress disorder, history of a stroke. ALLERGIES:  Include BACTRIM-swelling, ADHESIVE TAPE-rash. MEDICATIONS AT HOME:  She is on Symbicort. She is on albuterol HFA.     SOCIAL HISTORY: Reportedly nonsmoker. REVIEW OF SYSTEMS:  Unobtainable at this time. PHYSICAL EXAMINATION:  GENERAL:  The patient is an ill-appearing 61-year-old female. She is  able to speak. No acute distress. VITAL SIGNS:  Pulse , systolic blood pressures 999 and prior to  coming to the ER, respirations 17, O2 saturations 99% 1 liter. HEENT:  No supraclavicular lymph node enlargement. LUNGS:  Clear. No crackles, rales or wheezes. CARDIOVASCULAR:  Regular rhythm. ABDOMEN:  Soft. EXTREMITIES:  No edema. IMPRESSION:  1. Encephalopathy. 2.  Elevation of the anion gap metabolic acidosis. 3.  Elevated beta hydroxybutyrate with glucose of 1034, concern for DKA. 4.  Acute kidney injury. BUN 29 and 3.56.  5.  Hyperkalemia, potassium 5.5.  6.  Hyponatremia 161. The patient's acetaminophen level, salicylate  level and _ethanol level are all normal.  7.  History of cocaine and marijuana use. 8.  Bipolar affective disorder. 9.  Poor p.o. intake. PLAN:  1. Fluids per Nephrology. 2.  Currently on insulin drip. 3.  Central line requested. 4.  Chest x-ray reveals no acute process. _Urinalysis small ketones, negative  nitrites, negative leukocyte esterase. 5.  We will continue with heparin for subcu DVT prophylaxis. Follow the  patient in-house. Thank you Dr. Mariposa Harry the consult. Total of 45 minutes critical time spent on this patient. Of note, there  is no family listed to contact.         Rosita Ellis    D: 09/08/2022 17:40:20       T: 09/08/2022 17:45:50     [email or web address]  Job#: [id number]     Doc#: [id number]    CC:

## 2022-09-09 NOTE — DISCHARGE INSTR - COC
Continuity of Care Form    Patient Name: Aruna Barrientos   :  1965  MRN:  [id number]    Admit date:  2022  Discharge date:  ***    Code Status Order: Full Code   Advance Directives:     Admitting Physician:  Gonsalo Dixon MD  PCP: Meghan Higginbotham MD    Discharging Nurse: [facility] Unit/Room#:   Discharging Unit Phone Number: ***    Emergency Contact:   No emergency contact information on file.     Past Surgical History:  Past Surgical History:   Procedure Laterality Date    ABDOMEN SURGERY      drain tube    ABSCESS DRAINAGE      right buttock    CATARACT REMOVAL WITH IMPLANT Bilateral     CHEST TUBE INSERTION      FINGER AMPUTATION Left 2021    LEFT RING FINER AMPUTATION performed by Kaylee Garcia MD at [address] [facility] Right 10/11/2021    AMPUTATION RIGHT INDEX FINGER performed by Kaylee Garcia MD at [address] [place] [address] Right 2022    FOOT ABSCESS INCISION AND DRAINAGE  (PULSE LAVAGE, CULTURE SWABS) performed by Dorothy Navarro DPM at [address] [facility] Right 2022    FOOT ABSCESS INCISION AND DRAINAGE (PULSE LAVAGE, CULTURE SWABS) - Right    HAND SURGERY Right 2021    I&D INDEX FINGER performed by Kaylee Garcia MD at [facility] Right 09/15/2021    I&D INDEX FINGER performed by Kaylee Garcia MD at [address]  2/3/2022         LASIK Bilateral        Immunization History:   Immunization History   Administered Date(s) Administered    COVID-19, PFIZER PURPLE top, DILUTE for use, (age 15 y+), 30mcg/0.3mL 10/05/2021, 2021    Tdap (Boostrix, Adacel) 2017, 2020, 2021, 2022       Active Problems:  Patient Active Problem List   Diagnosis Code    IDDM (insulin dependent diabetes mellitus) [id number]    Lupus ([facility] M32.9    Post traumatic stress disorder (PTSD) F43.10    Acute cystitis with hematuria N30.01    Hyperglycemia due to diabetes mellitus ([facility] [facility] E11.65    Suicidal ideation R45.851    Arthritis M19.90    Chronic back pain M54.9, G89.29    Acid reflux K21.9    History of stroke Z86.73    Polysubstance abuse ([facility]) F19.10    Cocaine abuse ([facility] [address] F14.10    Chest pain R07.9    Acute non-recurrent maxillary sinusitis J01.00    Acute respiratory failure with hypercapnia ([facility]) J96.02    Alcohol use disorder, severe, dependence ([facility]) F10.20    Asthma exacerbation attacks J45.901    Bilateral edema of lower extremity R60.0    Bipolar 1 disorder, depressed, severe ([facility]) F31.4    BMI 34.0-34.9,adult Z68.34    Cannabis use disorder, severe, dependence ([facility]) F12.20    Cocaine use disorder, severe, dependence ([facility]) F14.20    Dizziness R42    Dyslipidemia E78.5    Family history of colon cancer Z80.0    History of lupus [id number]    Homicidal ideation R45.850    Hypotension I95.9    Neuropathy G62.9    Absolute anemia D64.9    Recurrent depression ([facility]) F33.9    Recurrent major depressive disorder, in partial remission ([facility]) F33.41    Severe recurrent major depression with psychotic features ([address] F33.3    Severe recurrent major depression without psychotic features ([address] F33.2    Upper GI bleed K92.2    Vitamin D deficiency E55.9    Acute encephalopathy G93.40    Gastroesophageal reflux disease K21.9    Brain lesion G93.9    Rib lesion M89.9    Diabetes mellitus type 2 in obese ([facility]) E11.69, E66.9    YAEL (generalized anxiety disorder) F41.1    Posttraumatic stress disorder F43.10    Suicidal intent R45.851    Leg weakness, bilateral R29.898    Poorly controlled diabetes mellitus ([facility]) E11.65    Felon of finger L03.019    Osteomyelitis ([facility]) M86.9    Recurrent falls R29.6    Seasonal allergic rhinitis J30.2    Fibromyalgia M79.7    Tenosynovitis of finger M65.9    Open wound of right index finger S61.200A    Adverse effect of COVID-19 vaccine T50. B95A    Wound dehiscence T81.30XA    Amputation finger S68.119A    Abscess of right index finger L02.511    Type 2 diabetes mellitus without complication, without long-term current use of insulin ([facility]) E11.9    Major depression F32.9    Right foot infection L08.9    Cellulitis and abscess of toe of right foot L03.031, L02.611    Abscess of right foot L02.611    Bilateral cellulitis of lower leg related to related to  uncontrolled diabetes L03.116, L03.115    Bipolar disorder, current episode mixed, unspecified ([facility]) F31.60    Right foot ulcer, with fat layer exposed ([address] L97.512    Ulcer of left foot, with fat layer exposed ([address] L97.522    CRP elevated R79.82    Status post incision and drainage Z98.890    Intractable headache R51.9    Type 2 diabetes mellitus with diabetic autonomic neuropathy, with long-term current use of insulin ([facility]) E11.43, Z79.4    Somnolence R40.0    Cerebral infarction due to embolism of left middle cerebral artery ([facility]) I63.412    DKA, type 1, not at goal [facility]) E10.10    DKA, type 2, not at goal [facility]) E11.10    Uremic encephalopathy G93.49, N19       Isolation/Infection:   Isolation            Contact          Patient Infection Status       Infection Onset Added Last Indicated Last Indicated By Review Planned Expiration Resolved Resolved By    [facility] 21 Culture, Anaerobic and Aerobic        Finger 2021    Resolved    COVID-19 (Rule Out) 21 COVID-19, Rapid (Ordered)   21 Rule-Out Test Resulted            Nurse Assessment:  Last Vital Signs: /69   Pulse (!) 112   Temp (P) 98.8 °F (37.1 °C) (Axillary)   Resp 22   Ht 5' 5\" (1.651 m)   Wt 180 lb 12.4 oz (82 kg)   LMP  (LMP Unknown)   SpO2 98%   BMI 30.08 kg/m²     Last documented pain score (0-10 scale): Pain Level: 0  Last Weight:   Wt Readings from Last 1 Encounters:   22 180 lb 12.4 oz (82 kg)     Mental Status:  {IP PT MENTAL STATUS:}    IV Access:  {[facility] ACCESS:[id number]}    Nursing Mobility/ADLs:  Walking   {CHP DME HOT}  Transfer  {ARON ANDERSON KAVS:736365313}  Bathing  {P DME MHWT:934493938}  Dressing  {CHP DME RCGP:252526245}  Toileting  {CHP DME BXPQ:335558441}  Feeding  {CHP DME LSNE:955974620}  Med Admin  {CHP DME IXGI:994466144}  Med Delivery   {[facility] MED Delivery:935028001}    Wound Care Documentation and Therapy:  Wound 09/08/22 Abdomen fold (Active)   Wound Image   09/09/22 1704   Wound Etiology Other 09/09/22 1704   Dressing Status New dressing applied 09/09/22 1704   Wound Cleansed Soap and water 09/09/22 1704   Dressing/Treatment Triad hydro/zinc oxide-based hydrophilic paste 58/88/74 8085   Wound Assessment Pink/red 09/09/22 1704   Drainage Amount None 09/09/22 1704   Odor None 09/09/22 1704   Tamar-wound Assessment Fragile 09/09/22 1704   Margins Attached edges 09/09/22 1704   Wound Thickness Description not for Pressure Injury Partial thickness 09/09/22 0400   Number of days: 1       Wound 09/08/22 Buttocks (Active)   Wound Image   09/09/22 1704   Wound Etiology Other 09/09/22 1704   Dressing Status New dressing applied 09/09/22 1704   Wound Cleansed Soap and water 09/09/22 1704   Dressing/Treatment Triad hydro/zinc oxide-based hydrophilic paste 37/58/53 3421   Wound Assessment Pink/red 09/09/22 1704   Drainage Amount None 09/09/22 1704   Odor None 09/09/22 1704   Tamar-wound Assessment Fragile 09/09/22 1704   Margins Attached edges 09/09/22 1704   Number of days: 1       Wound 09/09/22 Breast Right (Active)   Wound Image   09/09/22 1704   Wound Etiology Other 09/09/22 1704   Dressing Status New dressing applied 09/09/22 1704   Wound Cleansed Soap and water 09/09/22 1704   Dressing/Treatment Triad hydro/zinc oxide-based hydrophilic paste 46/43/12 8847   Wound Assessment Pink/red 09/09/22 1704   Drainage Amount None 09/09/22 1704   Odor None 09/09/22 1704   Tamar-wound Assessment Fragile 09/09/22 1704   Margins Attached edges 09/09/22 1704   Number of days: 0       Wound 09/09/22 Breast Left (Active)   Wound Image   09/09/22 1704   Wound Etiology Other 09/09/22 1704 Dressing Status New dressing applied 22 1704   Wound Cleansed Soap and water 22 170   Dressing/Treatment Triad hydro/zinc oxide-based hydrophilic paste  9886   Wound Assessment Pink/red 22 1704   Drainage Amount None 22 1704   Odor None 22 1704   Tamar-wound Assessment Fragile 22 1704   Margins Attached edges 22 170   Number of days: 0      Breast/abdominal folds, buttocks: Cleanse with soap and water, pat dry. Apply a thin layer of Triad cream twice daily and as needed    Elimination:  Continence: Bowel: {YES / ZO:26351}  Bladder: {YES / AP:96728}  Urinary Catheter: {Urinary Catheter:774369829}   Colostomy/Ileostomy/Ileal Conduit: {YES / SC:88652}       Date of Last BM: ***    Intake/Output Summary (Last 24 hours) at 2022 1715  Last data filed at 2022 0610  Gross per 24 hour   Intake 3676 ml   Output 950 ml   Net 2726 ml     I/O last 3 completed shifts:   In: 3752 [I.V.:3676]  Out: 950 [Urine:850; Emesis/NG output:100]    Safety Concerns:     [address] Safety Concerns:332177188}    Impairments/Disabilities:      [address] Impairments/Disabilities:973283590}    Nutrition Therapy:  Current Nutrition Therapy:   [address] Diet List:259492261}    Routes of Feeding: {CHP DME Other Feedings:100628060}  Liquids: {Slp liquid thickness:17091}  Daily Fluid Restriction: {CHP DME Yes amt example:923404764}  Last Modified Barium Swallow with Video (Video Swallowing Test): {Done Not Done IDZN:801063820}    Treatments at the Time of Hospital Discharge:   Respiratory Treatments: ***  Oxygen Therapy:  {Therapy; copd oxygen:93492}  Ventilator:    { RAFIA Vent KOVI:494665347}    Rehab Therapies: {THERAPEUTIC INTERVENTION:5190151039}  Weight Bearing Status/Restrictions: [facility] Weight Bearin}  Other Medical Equipment (for information only, NOT a DME order):  {EQUIPMENT:127675646}  Other Treatments: ***    Patient's personal belongings (please select all that are sent with patient):  {CHARON DME Belongings:169346116}    RN SIGNATURE:  {Esignature:969840937}    CASE MANAGEMENT/SOCIAL WORK SECTION    Inpatient Status Date: ***    Readmission Risk Assessment Score:  Readmission Risk              Risk of Unplanned Readmission:  64           Discharging to Facility/ Agency   Name:   Address:  Phone:  Fax:    Dialysis Facility (if applicable)   Name:  Address:  Dialysis Schedule:  Phone:  Fax:    / signature: {Esignature:449779218}    PHYSICIAN SECTION    Prognosis: {Prognosis:9264573024}    Condition at Discharge: [address] [place] [address] Patient Condition:658682835}    Rehab Potential (if transferring to Rehab): {Prognosis:6159143231}    Recommended Labs or Other Treatments After Discharge: ***    Physician Certification: I certify the above information and transfer of Alen Layton  is necessary for the continuing treatment of the diagnosis listed and that she requires {Admit to Appropriate Level of Care:38972} for {GREATER/LESS:753071500} 30 days.      Update Admission H&P: {CHP DME Changes in ORQ:593777232}    PHYSICIAN SIGNATURE:  {Esignature:770834096}

## 2022-09-09 NOTE — PROGRESS NOTES
Pt insulin drip changed based on most recent BG. Both 0000 BG and 0100 BMP resulted at the same time d/t issue with BG orders crossing over.

## 2022-09-09 NOTE — PROGRESS NOTES
[id number] [employer]    PROGRESS NOTE             9/9/2022    3:19 PM    Name:   Ray Veloz  MRN:     [id number]     Acct:      [de-identified]   Room:   2007/2007-01   Day:  1  Admit Date:  9/8/2022 11:01 AM    PCP:  Art Al MD  Code Status:  Full Code    Subjective:     C/C:   Chief Complaint   Patient presents with    Hyperglycemia    Altered Mental Status     Interval History Status: improved. The patient was seen and examined at the bedside. She is agitated and pulling her NG tube. Order for restraints placed. The patient responds to her name, but cannot answer questions. HR: 112 (metoprolol on hold). RR elevated (23). Brief History:     The patient is a poor historian due to altered mental status. The patient is a 56 y.o. Non- / non  female with a history of asthma, bipolar 1 disorder, hypertension, stroke, polysubstance abuse, DM 2, degenerative disc disease, and depression with psychotic features, who presented to the ED with altered mental status. The patient was found unresponsive at her home where she lives alone. The patient had not been taking her insulin as her refrigerator was filled with unused insulin vials. The patients family had not heard from the patient for several days and EMS was called. The patient was found confused and obtunded, hypotensive, and hyperglycemic. The ED, the patient was hypotensive (BP 86/64) and tachypneic (RR 24). Blood glucose was 1,034. Serum potassium was 5.5, bicarbonate was 19 mmol/L [anion gap 25 mmol/L). Cr was 3.56 (baseline Cr 0.73). Levophed was administered. The patient was admitted for management of DKA and DKA protocol was initiated. Review of Systems:     Unable to perform due to disorientation and somnolent state. Medications: Allergies:     Allergies   Allergen Reactions    Bactrim [Sulfamethoxazole-Trimethoprim] Swelling Adhesive Tape Rash       Current Meds:   Scheduled Meds:    pantoprazole (PROTONIX) 40 mg injection  40 mg IntraVENous BID    insulin glargine  35 Units SubCUTAneous Once    insulin lispro  0-4 Units SubCUTAneous TID WC    insulin lispro  0-4 Units SubCUTAneous Nightly    atorvastatin  40 mg Oral Nightly    budesonide-formoterol  2 puff Inhalation BID    [Held by provider] insulin glargine  30 Units SubCUTAneous BID    [Held by provider] metFORMIN  1,000 mg Oral BID WC    mirtazapine  7.5 mg Oral Nightly    traZODone  150 mg Oral Nightly    venlafaxine  150 mg Oral Daily with breakfast    fluticasone  1 spray Each Nostril Daily     Continuous Infusions:    dextrose 5 % and 0.45 % NaCl      dextrose      dextrose      norepinephrine       PRN Meds: glucose, dextrose bolus **OR** dextrose bolus, glucagon (rDNA), dextrose, glucose, dextrose bolus **OR** dextrose bolus, glucagon (rDNA), dextrose, albuterol sulfate HFA, dextrose bolus **OR** dextrose bolus, sodium phosphate IVPB **OR** sodium phosphate IVPB **OR** sodium phosphate IVPB, polyethylene glycol, haloperidol lactate    Data:     Past Medical History:   has a past medical history of Acid reflux, Acute cystitis with hematuria, Acute non-recurrent maxillary sinusitis, Asthma, Bipolar 1 disorder ([facility] Bipolar disorder, mixed ([facility] BMI 34.0-34.9,adult, Cannabis use disorder, severe, dependence ([address] Cerebrovascular accident (CVA) ([facility] Chest pain, Chronic renal insufficiency, Cocaine abuse ([facility] COVID-19 virus RNA test result unknown, DDD (degenerative disc disease), cervical, Diabetes mellitus ([address] Dizziness, Fibromyalgia, History of stroke, Homicidal ideation, Hyperglycemia, Hypertension, Hypotension, IDDM (insulin dependent diabetes mellitus), Lupus ([address] Migraine, Neuropathy, Neuropathy, Polysubstance abuse ([address] Post traumatic stress disorder (PTSD), Posttraumatic stress disorder, Recurrent depression ([facility] Recurrent major depressive disorder, in partial remission ([facility] Screening mammogram, encounter for, Severe recurrent major depression with psychotic features ([facility] Severe recurrent major depression without psychotic features ([facility] Stroke (cerebrum) ([facility] Stroke ([facility] Suicidal ideation, Suicidal intent, Vitamin D deficiency, and White matter changes. Social History:   reports that she has never smoked. She has never used smokeless tobacco. She reports that she does not currently use alcohol. She reports current drug use. Drugs: Marijuana (Weed) and Cocaine. Family History:   Family History   Problem Relation Age of Onset    Diabetes Mother     Stroke Mother     Diabetes Father     Diabetes Sister     Diabetes Brother     Other Son         GSW    No Known Problems Sister        Vitals:  /69   Pulse (!) 112   Temp (P) 98.8 °F (37.1 °C) (Axillary)   Resp 22   Ht 5' 5\" (1.651 m)   Wt 180 lb 12.4 oz (82 kg)   LMP  (LMP Unknown)   SpO2 98%   BMI 30.08 kg/m²   Temp (24hrs), Av.9 °F (36.6 °C), Min:97.6 °F (36.4 °C), Max:98.8 °F (37.1 °C)    Recent Labs     22  1005 22  1103 22  1202 22  1314   POCGLU 204* 203* 205* 189*       I/O(24Hr):     Intake/Output Summary (Last 24 hours) at 2022 1519  Last data filed at 2022 0610  Gross per 24 hour   Intake 3676 ml   Output 950 ml   Net 2726 ml       Labs:  [unfilled]    Lab Results   Component Value Date/Time    SPECIAL NOT REPORTED 2022 12:47 PM     Lab Results   Component Value Date/Time    CULTURE NO GROWTH 5 DAYS 2022 12:18 PM       [unfilled]    Radiology:    XR ABDOMEN (KUB) (SINGLE AP VIEW)    Result Date: 2022  EXAMINATION: ONE SUPINE XRAY VIEW(S) OF THE ABDOMEN 2022 9:33 am COMPARISON: 2022, 2022 HISTORY: ORDERING SYSTEM PROVIDED HISTORY: NGT placement, dark liquid coming through NG TECHNOLOGIST PROVIDED HISTORY: NGT placement, dark liquid coming through NG Reason for Exam: NGT placement, dark liquid coming through NG FINDINGS: Enteric tube coiled in the body of the stomach. Nonobstructive bowel gas pattern. Mild stool burden. Multiple external devices project over the patient. No acute osseous abnormality identified. Enteric tube coiled at the level of the body of the stomach. Nonobstructive bowel gas pattern. CT HEAD WO CONTRAST    Result Date: 9/8/2022  EXAMINATION: CT OF THE HEAD WITHOUT CONTRAST  9/8/2022 11:38 am TECHNIQUE: CT of the head was performed without the administration of intravenous contrast. Automated exposure control, iterative reconstruction, and/or weight based adjustment of the mA/kV was utilized to reduce the radiation dose to as low as reasonably achievable. COMPARISON: MRI 08/04/2022, 08/03/2022 and CT 08/03/2022. HISTORY: ORDERING SYSTEM PROVIDED HISTORY: Mental Status Changes TECHNOLOGIST PROVIDED HISTORY: Mental Status Changes Decision Support Exception - unselect if not a suspected or confirmed emergency medical condition->Emergency Medical Condition (MA) Is the patient pregnant?->No Reason for Exam: AMS Additional signs and symptoms: AMS FINDINGS: BRAIN/VENTRICLES: There is no acute intracranial hemorrhage, mass effect or midline shift. No abnormal extra-axial fluid collection. Encephalomalacia in the left frontal lobe demonstrated corresponding to recently demonstrated ischemia. Cortical atrophy and chronic white matter changes in the brain and associated ventricular enlargement are again demonstrated. ORBITS: The visualized portion of the orbits demonstrate no acute abnormality. SINUSES: The visualized paranasal sinuses and mastoid air cells demonstrate no acute abnormality. SOFT TISSUES/SKULL:  No acute abnormality of the visualized skull or soft tissues. Chronic findings in the brain without acute CT abnormality identified. Expected evolution of recently demonstrated ischemia in the left frontal lobe.      XR CHEST PORTABLE    Result Date: 9/8/2022  EXAMINATION: ONE XRAY VIEW OF THE CHEST 9/8/2022 7:53 pm COMPARISON: 08/17/2022 HISTORY: ORDERING SYSTEM PROVIDED HISTORY: line placement TECHNOLOGIST PROVIDED HISTORY: line placement Reason for Exam: Line placement FINDINGS: Central venous catheter tip overlies the right atrium. No pleural effusion or pneumothorax. Heart size is at the upper limits of normal.     Right central venous catheter tip overlies the right atrium. No pneumothorax. XR CHEST PORTABLE    Result Date: 8/17/2022  EXAMINATION: ONE XRAY VIEW OF THE CHEST 8/17/2022 8:09 pm COMPARISON: None. HISTORY: ORDERING SYSTEM PROVIDED HISTORY: chest pain TECHNOLOGIST PROVIDED HISTORY: chest pain FINDINGS: Chest radiograph: The cardiomediastinal silhouette and hilar contours are normal. The lungs are clear with no focal consolidation, pleural effusion or pneumothorax. The overlying soft tissue and osseous structures do not demonstrate acute abnormality. No acute intrathoracic pathology. US RETROPERITONEAL COMPLETE    Result Date: 9/8/2022  EXAMINATION: RETROPERITONEAL ULTRASOUND OF THE KIDNEYS AND URINARY BLADDER 9/8/2022 COMPARISON: None HISTORY: ORDERING SYSTEM PROVIDED HISTORY: Acute kidney injury TECHNOLOGIST PROVIDED HISTORY: Acute kidney injury 57-year-old female with acute kidney injury FINDINGS: Kidneys: Right kidney measures 10.4 x 4.3 x 5.0 cm. Right renal cortical thickness measures 1.7 cm. Left kidney measures 9.4 x 4.8 x 4.8 cm. Left renal cortical thickness measures 1.8 cm. No hydronephrosis or perinephric fluid. No echogenic foci with posterior acoustic shadowing to suggest renal calculi. Gross preservation of the bilateral corticomedullary differentiation. Bladder: Not imaged. Unremarkable renal ultrasound. No hydronephrosis. Physical Examination:        Physical Exam  Constitutional:       Appearance: She is ill-appearing. HENT:      Head: Normocephalic and atraumatic. Nose: No congestion or rhinorrhea.    Eyes:      Extraocular Movements: Extraocular movements intact. Conjunctiva/sclera: Conjunctivae normal.      Pupils: Pupils are equal, round, and reactive to light. Cardiovascular:      Rate and Rhythm: Tachycardia present. Pulses: Normal pulses. Heart sounds: Normal heart sounds. No murmur heard. No friction rub. No gallop. Pulmonary:      Effort: Pulmonary effort is normal. No respiratory distress. Breath sounds: Normal breath sounds. No wheezing or rales. Abdominal:      General: Abdomen is flat. Bowel sounds are normal. There is no distension. Palpations: There is no mass. Tenderness: There is no guarding. Musculoskeletal:      Right lower leg: No edema. Left lower leg: No edema. Skin:     Capillary Refill: Capillary refill takes less than 2 seconds. Coloration: Skin is not pale. Findings: No bruising. Neurological:      General: No focal deficit present. Mental Status: She is disoriented. Sensory: No sensory deficit. Motor: No weakness.    Psychiatric:         Mood and Affect: Mood normal.         Assessment:        Primary Problem  DKA, type 1, not at goal [facility])    Active Hospital Problems    Diagnosis Date Noted    Uremic encephalopathy [G93.49, N19] 09/09/2022     Priority: Medium    DKA, type 1, not at goal [facility]) [E10.10] 09/08/2022     Priority: Medium    DKA, type 2, not at goal [facility]) [E11.10] 09/08/2022     Priority: Medium    Cocaine abuse ([facility] [F14.10] 01/05/2018       Plan:        DKA, 2/2 poor insulin compliance  -Glucose on admssion: 1,034; glucose today 165  -On admission Na+: 161; K+ 5.5; serum osmolality 328  -NPO effective immediately  -DKA protocol started  -BMP q4h, initial Mag and Phos levels  -Glucose checks every hour  -Betahydroxybutarate levels: 5.47  -Home Lantus 30 units BID held  -IV Normal Saline at 250 mL/hr  -D5 and 0.45% NS at 150 mL/hr when blood glucose less than 250 mg/dL  -Will monitor bicarb and anion gap, bridge with home lantus dose and begin diet PO when bicarb >15 and gap closed x 2 measurements  -Discontinue Insulin drip 2 hours post PO intake; hold insulin drip  -Hypoglycemia, phos and potassium replacement protocols in place   -Diabetes educator consulted   -General surgery placed central line         Acute Kidney Injury pre-renal azotemia secondary to dehydration  -Baseline Cr 0.73;  Cr on admission: 3.56; Cr today 2.62  -Change IV NS infusion to 0.45 NS at 150 ml/hr  -BMP q4 hours  -monitor urine output  -Nephrology consulted       Hypotension secondary to dehydration by osmotic diuresis  -BP today:100/73  -Free water deficit 10 L  -Continue NS infusion      Hypernatremia  -Na+: 169; Corrected Na+ 171  -Free water deficit 10 L  -NG tube placed for free water administration (on hold)  -Continue IV NS infusion to 0.45 NS at 150 ml/hr       Acute metabolic encephalopathy s/s to electrolyte abnormality  -Restraints in place; patient oriented to person only  -Nephrology on board; electrolyte replacement        Polysubstance abuse  -Urine tox positive for Fentanyl and cocaine  -Psychiatry consulted        Ventricular tachycardia  -Non-sustained VT secondary to electrolye imbalance, metabolic derangement and cocaine  -Continue to manage hyperglycemia and electrolytes  -Discontinue metoprolol  -Echo ordered      Depression with psychotic features  -Continue Haloperidol lactate 5 mg IM q 6 hours PRN  -Continue Mirtazapine 7.5 mg po nightly  -Continue Trazodone 150 mg po nightly  -Continue Venlafaxine 150 mg po daily  -Psychiatry consulted        Asthma  -Continue home Proventil ventolin 2 puff q 4 hours as needed   -Continue home Symbicort 2 puffs BID  -Continue home Flonase 1 spray each nostril daily         Code: Full code  Diet: NPO; NG tube in place  DVT prophylaxis: on hold    Jose Heart MD  9/9/2022  3:19 PM      Attending Physician Statement  I have discussed the care of Alen Contrerasll and I have examined the patient myselft and taken ros and hpi , including pertinent history and exam findings,  with the resident. I have reviewed the key elements of all parts of the encounter with the resident. I agree with the assessment, plan and orders as documented by the resident. Critical care time spent 35 minutes in reviewing data/medicines/talking to patient/family,  explaining and answering all the questions.     82-year-old female with underlying history of diabetes, smoking, noncompliance, polysubstance abuse, brought to the ER after she was found down, found to be severely dehydrated, in shock, hypovolemic, septic, severely deranged electrolytes, metabolic encephalopathy multifactorial, uremic encephalopathy, electrolyte imbalance, acute kidney injury with acute tubular necrosis, major depression with psychotic features, fluid resuscitated, nephrology on board, pulmonary critical care on board, patient in restraints, patient has been very aggressive and high likelihood of harming herself pulling lines,, NG in place, high risk of upper GI bleed, monitor hemoglobin,  On Protonix 40 mg twice daily,  Prognosis is very poor, she also is positive for cocaine and fentanyl    Electronically signed by Trey Benz MD

## 2022-09-09 NOTE — PROGRESS NOTES
Pulmonary Progress Note  [facility]      Patient - Ingrid Bateman,  Age - 56 y.o.    - 1965      Room Number -    MRN -  [id number]   Acct # - [de-identified]  Date of Admission -  2022 11:01 AM    Consulting Service/Physician   Consulting - Jennifer King MD  Primary Care Physician - Barney Zaidi MD     SUBJECTIVE   Patient appears to be not in a rate from her mind. Currently her mean arterial pressures is 77 mmHg    Fluid resuscitation being run by nephrology    OBJECTIVE   VITALS    height is 5' 5\" (1.651 m) and weight is 180 lb 12.4 oz (82 kg). Her axillary temperature is 97.6 °F (36.4 °C). Her blood pressure is 96/54 (abnormal) and her pulse is 101 (abnormal). Her respiration is 28 and oxygen saturation is 88% (abnormal). Body mass index is 30.08 kg/m². Temperature Range: Temp: 97.6 °F (36.4 °C) Temp  Av.6 °F (36.4 °C)  Min: 97.6 °F (36.4 °C)  Max: 97.7 °F (36.5 °C)  BP Range:  Systolic (43UIJ), IBD:36 , Min:72 , QLZ:854     Diastolic (90LBL), PMA:49, Min:24, Max:124    Pulse Range: Pulse  Av.2  Min: 79  Max: 121  Respiration Range: Resp  Av.5  Min: 14  Max: 31  Current Pulse Ox[de-identified]  SpO2: (!) 88 %  24HR Pulse Ox Range:  SpO2  Av.7 %  Min: 88 %  Max: 100 %  Oxygen Amount and Delivery: Wt Readings from Last 3 Encounters:   22 180 lb 12.4 oz (82 kg)   22 240 lb (108.9 kg)   22 236 lb (107 kg)       I/O (24 Hours)    Intake/Output Summary (Last 24 hours) at 2022 1007  Last data filed at 2022 0610  Gross per 24 hour   Intake 3676 ml   Output 950 ml   Net 2726 ml       EXAM     General Appearance  Awake, alert, oriented, in no acute distress  HEENT - normocephalic, atraumatic. Neck - Supple,  trachea midline   Lungs -clear anteriorly and laterally. Heart Exam:PMI normal. No lifts, heaves, or thrills. RRR. No murmurs, clicks, gallops, or rubs  Abdomen Exam: Abdomen soft, non-tender.    Extremity Exam: No signs of cyanosis    MEDS      atorvastatin  40 mg Oral Nightly    budesonide-formoterol  2 puff Inhalation BID    [Held by provider] insulin glargine  30 Units SubCUTAneous BID    [Held by provider] metFORMIN  1,000 mg Oral BID WC    mirtazapine  7.5 mg Oral Nightly    pantoprazole  40 mg Oral QAM AC    traZODone  150 mg Oral Nightly    venlafaxine  150 mg Oral Daily with breakfast    fluticasone  1 spray Each Nostril Daily    heparin (porcine)  5,000 Units SubCUTAneous Q12H      sodium chloride 200 mL/hr at 09/09/22 0724    dextrose      insulin 0.0426 Units/kg/hr (09/09/22 0907)    dextrose 5 % and 0.45 % NaCl      norepinephrine       metoprolol, glucose, dextrose bolus **OR** dextrose bolus, glucagon (rDNA), dextrose, albuterol sulfate HFA, dextrose bolus **OR** dextrose bolus, sodium phosphate IVPB **OR** sodium phosphate IVPB **OR** sodium phosphate IVPB, polyethylene glycol, dextrose 5 % and 0.45 % NaCl, haloperidol lactate    LABS   CBC   Recent Labs     09/09/22  0441   WBC 3.5   HGB 10.3*   HCT 32.3*   MCV 96.3        BMP:   Lab Results   Component Value Date/Time     09/09/2022 04:41 AM    K 4.2 09/09/2022 04:41 AM     09/09/2022 04:41 AM    CO2 19 09/09/2022 04:41 AM     09/09/2022 04:41 AM    LABALBU 4.0 09/08/2022 11:24 AM    CREATININE 2.62 09/09/2022 04:41 AM    CALCIUM 8.4 09/09/2022 04:41 AM    GFRAA 23 09/09/2022 04:41 AM    LABGLOM 19 09/09/2022 04:41 AM     ABGs:No results found for: PHART, PO2ART, [id number]   Lab Results   Component Value Date/Time    MODE NOT REPORTED 11/04/2021 05:23 PM     Ionized Calcium:  No results found for: IONCA  Magnesium:    Lab Results   Component Value Date/Time    MG 2.4 09/09/2022 04:41 AM     Phosphorus:    Lab Results   Component Value Date/Time    PHOS 2.4 09/09/2022 04:41 AM        LIVER PROFILE   Recent Labs     09/08/22  1124   AST 11   ALT 15   BILITOT 0.4   ALKPHOS 151*     INR No results for input(s): INR in the last 72 hours.  PTT   Lab Results   Component Value Date    APTT 23.2 08/04/2022         RADIOLOGY     (See actual reports for details)    ASSESSMENT/PLAN     Patient Active Problem List   Diagnosis    IDDM (insulin dependent diabetes mellitus)    Lupus ([facility])    Post traumatic stress disorder (PTSD)    Acute cystitis with hematuria    Hyperglycemia due to diabetes mellitus ([facility])    Suicidal ideation    Arthritis    Chronic back pain    Acid reflux    History of stroke    Polysubstance abuse ([facility])    Cocaine abuse ([address]    Chest pain    Acute non-recurrent maxillary sinusitis    Acute respiratory failure with hypercapnia ([facility])    Alcohol use disorder, severe, dependence ([address]    Asthma exacerbation attacks    Bilateral edema of lower extremity    Bipolar 1 disorder, depressed, severe ([facility])    BMI 34.0-34.9,adult    Cannabis use disorder, severe, dependence ([facility])    Cocaine use disorder, severe, dependence ([address]    Dizziness    Dyslipidemia    Family history of colon cancer    History of lupus    Homicidal ideation    Hypotension    Neuropathy    Absolute anemia    Recurrent depression (HCC)    Recurrent major depressive disorder, in partial remission ([facility])    Severe recurrent major depression with psychotic features ([address]    Severe recurrent major depression without psychotic features ([address]    Upper GI bleed    Vitamin D deficiency    Acute encephalopathy    Gastroesophageal reflux disease    Brain lesion    Rib lesion    Diabetes mellitus type 2 in obese ([facility])    YAEL (generalized anxiety disorder)    Posttraumatic stress disorder    Suicidal intent    Leg weakness, bilateral    Poorly controlled diabetes mellitus ([facility])    Felon of finger    Osteomyelitis ([facility])    Recurrent falls    Seasonal allergic rhinitis    Fibromyalgia    Tenosynovitis of finger    Open wound of right index finger    Adverse effect of COVID-19 vaccine    Wound dehiscence    Amputation finger    Abscess of right index finger    Type 2 diabetes mellitus without complication, without long-term current use of insulin (HCC)    Major depression    Right foot infection    Cellulitis and abscess of toe of right foot    Abscess of right foot    Bilateral cellulitis of lower leg related to related to  uncontrolled diabetes    Bipolar disorder, current episode mixed, unspecified ([address]    Right foot ulcer, with fat layer exposed ([address]    Ulcer of left foot, with fat layer exposed ([address]    CRP elevated    Status post incision and drainage    Intractable headache    Type 2 diabetes mellitus with diabetic autonomic neuropathy, with long-term current use of insulin ([facility])    Somnolence    Cerebral infarction due to embolism of left middle cerebral artery ([address]    DKA, type 1, not at goal [facility])    DKA, type 2, not at goal [facility])     IMPRESSION:  1. Encephalopathy. 2.  Elevation of the anion gap metabolic acidosis. Anion gap improved now down to 12  3. Elevated beta hydroxybutyrate with glucose of 1034, concern for DKA. 4.  Acute kidney injury. BUN 29 and 3.56 upon admission down to 105/2.62  5. Hyperkalemia, potassium 5.5. Now down to 4.2  6. Hyper natremia 161--- now 170. .    7.  History of cocaine and marijuana use. --he patient's acetaminophen level, salicylate  level and ethanol level are all normal.  8. Tox screen positive for fentanyl and cocaine  9   Bipolar affective disorder. 10.. Poor p.o. intake. 11.  Full code      Following closely with other services. Nephrology running in the IV fluids in light of acute kidney injury which is improving, also in light of hyponatremia. Cautious and over correcting too fast to prevent cerebral edema. Recheck beta hydroxybutyrate    There is no listed family members on the facesheet to contact. [phone] --this is the number listed as contact info. When called, a voice from,' were sorry.   Your call did not go through.'    Electronically signed by Nick Martinez MD on 9/9/2022 at 10:07 AM

## 2022-09-09 NOTE — PROGRESS NOTES
Spoke with the medicine residents and informed them that the patient is not awake and oriented enough to eat. Also informed them that the patients NG tube was retracted to 50 cm. They said to keep the insulin drip going for right now.

## 2022-09-09 NOTE — PROGRESS NOTES
Dr. Sang Mccrary rounded. Informed of Vtach and showed him the rhythm strip. Would like for an order for echo to be done.

## 2022-09-10 ENCOUNTER — APPOINTMENT (OUTPATIENT)
Dept: ULTRASOUND IMAGING | Age: 57
DRG: 420 | End: 2022-09-10
Payer: COMMERCIAL

## 2022-09-10 PROBLEM — F39 UNSPECIFIED MOOD (AFFECTIVE) DISORDER (HCC): Status: ACTIVE | Noted: 2022-09-10

## 2022-09-10 LAB
ABSOLUTE EOS #: 0.1 K/UL (ref 0–0.4)
ABSOLUTE LYMPH #: 1.4 K/UL (ref 1–4.8)
ABSOLUTE MONO #: 1 K/UL (ref 0.1–1.3)
ANION GAP SERPL CALCULATED.3IONS-SCNC: 9 MMOL/L (ref 9–17)
ANTI DNA DOUBLE STRANDED: 0.6 IU/ML
ANTI-NUCLEAR ANTIBODY (ANA): POSITIVE
BASOPHILS # BLD: 0 % (ref 0–2)
BASOPHILS ABSOLUTE: 0 K/UL (ref 0–0.2)
BUN BLDV-MCNC: 61 MG/DL (ref 6–20)
CALCIUM SERPL-MCNC: 7.5 MG/DL (ref 8.6–10.4)
CHLORIDE BLD-SCNC: 132 MMOL/L (ref 98–107)
CO2: 16 MMOL/L (ref 20–31)
CREAT SERPL-MCNC: 1.47 MG/DL (ref 0.5–0.9)
ENA ANTIBODIES SCREEN: 1.2 U/ML
EOSINOPHILS RELATIVE PERCENT: 1 % (ref 0–4)
FERRITIN: 346 NG/ML (ref 13–150)
FOLATE: 7.7 NG/ML
GFR AFRICAN AMERICAN: 45 ML/MIN
GFR NON-AFRICAN AMERICAN: 37 ML/MIN
GFR SERPL CREATININE-BSD FRML MDRD: ABNORMAL ML/MIN/{1.73_M2}
GLUCOSE BLD-MCNC: 198 MG/DL (ref 65–105)
GLUCOSE BLD-MCNC: 212 MG/DL (ref 65–105)
GLUCOSE BLD-MCNC: 297 MG/DL (ref 65–105)
GLUCOSE BLD-MCNC: 390 MG/DL (ref 65–105)
GLUCOSE BLD-MCNC: 409 MG/DL (ref 70–99)
HAV IGM SER IA-ACNC: NONREACTIVE
HCT VFR BLD CALC: 25.1 % (ref 36–46)
HCT VFR BLD CALC: 26.9 % (ref 36–46)
HCT VFR BLD CALC: 27.3 % (ref 36–46)
HEMOGLOBIN: 8.3 G/DL (ref 12–16)
HEMOGLOBIN: 8.6 G/DL (ref 12–16)
HEMOGLOBIN: 8.8 G/DL (ref 12–16)
HEPATITIS B CORE IGM ANTIBODY: NONREACTIVE
HEPATITIS B SURFACE ANTIGEN: NONREACTIVE
HEPATITIS C ANTIBODY: NONREACTIVE
IRON SATURATION: 51 % (ref 20–55)
IRON: 62 UG/DL (ref 37–145)
LYMPHOCYTES # BLD: 15 % (ref 24–44)
MCH RBC QN AUTO: 30.4 PG (ref 26–34)
MCHC RBC AUTO-ENTMCNC: 31.4 G/DL (ref 31–37)
MCV RBC AUTO: 96.8 FL (ref 80–100)
MONOCYTES # BLD: 11 % (ref 1–7)
PDW BLD-RTO: 13.5 % (ref 11.5–14.9)
PHOSPHORUS: 2 MG/DL (ref 2.6–4.5)
PLATELET # BLD: 153 K/UL (ref 150–450)
PMV BLD AUTO: 9.1 FL (ref 6–12)
POTASSIUM SERPL-SCNC: 3.5 MMOL/L (ref 3.7–5.3)
RBC # BLD: 2.82 M/UL (ref 4–5.2)
SEG NEUTROPHILS: 73 % (ref 36–66)
SEGMENTED NEUTROPHILS ABSOLUTE COUNT: 6.7 K/UL (ref 1.3–9.1)
SODIUM BLD-SCNC: 149 MMOL/L (ref 135–144)
SODIUM BLD-SCNC: 157 MMOL/L (ref 135–144)
SODIUM BLD-SCNC: 157 MMOL/L (ref 135–144)
SODIUM BLD-SCNC: 158 MMOL/L (ref 135–144)
SODIUM BLD-SCNC: 160 MMOL/L (ref 135–144)
TOTAL IRON BINDING CAPACITY: 122 UG/DL (ref 250–450)
UNSATURATED IRON BINDING CAPACITY: 60 UG/DL (ref 112–347)
VITAMIN B-12: 839 PG/ML (ref 232–1245)
WBC # BLD: 9.3 K/UL (ref 3.5–11)

## 2022-09-10 PROCEDURE — 36415 COLL VENOUS BLD VENIPUNCTURE: CPT

## 2022-09-10 PROCEDURE — 2500000003 HC RX 250 WO HCPCS: Performed by: STUDENT IN AN ORGANIZED HEALTH CARE EDUCATION/TRAINING PROGRAM

## 2022-09-10 PROCEDURE — 2000000000 HC ICU R&B

## 2022-09-10 PROCEDURE — 6370000000 HC RX 637 (ALT 250 FOR IP): Performed by: STUDENT IN AN ORGANIZED HEALTH CARE EDUCATION/TRAINING PROGRAM

## 2022-09-10 PROCEDURE — 85018 HEMOGLOBIN: CPT

## 2022-09-10 PROCEDURE — 2580000003 HC RX 258: Performed by: INTERNAL MEDICINE

## 2022-09-10 PROCEDURE — C9113 INJ PANTOPRAZOLE SODIUM, VIA: HCPCS | Performed by: STUDENT IN AN ORGANIZED HEALTH CARE EDUCATION/TRAINING PROGRAM

## 2022-09-10 PROCEDURE — 6360000002 HC RX W HCPCS: Performed by: STUDENT IN AN ORGANIZED HEALTH CARE EDUCATION/TRAINING PROGRAM

## 2022-09-10 PROCEDURE — 82607 VITAMIN B-12: CPT

## 2022-09-10 PROCEDURE — 85014 HEMATOCRIT: CPT

## 2022-09-10 PROCEDURE — 83540 ASSAY OF IRON: CPT

## 2022-09-10 PROCEDURE — 82728 ASSAY OF FERRITIN: CPT

## 2022-09-10 PROCEDURE — 84295 ASSAY OF SERUM SODIUM: CPT

## 2022-09-10 PROCEDURE — 94640 AIRWAY INHALATION TREATMENT: CPT

## 2022-09-10 PROCEDURE — 82947 ASSAY GLUCOSE BLOOD QUANT: CPT

## 2022-09-10 PROCEDURE — 85025 COMPLETE CBC W/AUTO DIFF WBC: CPT

## 2022-09-10 PROCEDURE — 83550 IRON BINDING TEST: CPT

## 2022-09-10 PROCEDURE — 76705 ECHO EXAM OF ABDOMEN: CPT

## 2022-09-10 PROCEDURE — 82746 ASSAY OF FOLIC ACID SERUM: CPT

## 2022-09-10 PROCEDURE — 99291 CRITICAL CARE FIRST HOUR: CPT | Performed by: INTERNAL MEDICINE

## 2022-09-10 PROCEDURE — 80048 BASIC METABOLIC PNL TOTAL CA: CPT

## 2022-09-10 PROCEDURE — 2500000003 HC RX 250 WO HCPCS: Performed by: INTERNAL MEDICINE

## 2022-09-10 PROCEDURE — A4216 STERILE WATER/SALINE, 10 ML: HCPCS | Performed by: STUDENT IN AN ORGANIZED HEALTH CARE EDUCATION/TRAINING PROGRAM

## 2022-09-10 PROCEDURE — 6360000002 HC RX W HCPCS: Performed by: INTERNAL MEDICINE

## 2022-09-10 PROCEDURE — 84100 ASSAY OF PHOSPHORUS: CPT

## 2022-09-10 PROCEDURE — 80074 ACUTE HEPATITIS PANEL: CPT

## 2022-09-10 PROCEDURE — 2580000003 HC RX 258: Performed by: STUDENT IN AN ORGANIZED HEALTH CARE EDUCATION/TRAINING PROGRAM

## 2022-09-10 PROCEDURE — 90792 PSYCH DIAG EVAL W/MED SRVCS: CPT | Performed by: PSYCHIATRY & NEUROLOGY

## 2022-09-10 RX ORDER — SODIUM CHLORIDE 450 MG/100ML
INJECTION, SOLUTION INTRAVENOUS CONTINUOUS
Status: DISCONTINUED | OUTPATIENT
Start: 2022-09-10 | End: 2022-09-10

## 2022-09-10 RX ORDER — SODIUM CHLORIDE 9 MG/ML
INJECTION, SOLUTION INTRAVENOUS CONTINUOUS
Status: DISCONTINUED | OUTPATIENT
Start: 2022-09-10 | End: 2022-09-11 | Stop reason: ALTCHOICE

## 2022-09-10 RX ADMIN — POTASSIUM CHLORIDE: 2 INJECTION, SOLUTION, CONCENTRATE INTRAVENOUS at 18:32

## 2022-09-10 RX ADMIN — INSULIN GLARGINE 30 UNITS: 100 INJECTION, SOLUTION SUBCUTANEOUS at 20:30

## 2022-09-10 RX ADMIN — SODIUM CHLORIDE: 4.5 INJECTION, SOLUTION INTRAVENOUS at 07:49

## 2022-09-10 RX ADMIN — SODIUM CHLORIDE: 9 INJECTION, SOLUTION INTRAVENOUS at 22:44

## 2022-09-10 RX ADMIN — SODIUM PHOSPHATE, MONOBASIC, MONOHYDRATE AND SODIUM PHOSPHATE, DIBASIC, ANHYDROUS 15 MMOL: 276; 142 INJECTION, SOLUTION INTRAVENOUS at 14:39

## 2022-09-10 RX ADMIN — SODIUM CHLORIDE 40 MG: 9 INJECTION INTRAMUSCULAR; INTRAVENOUS; SUBCUTANEOUS at 08:47

## 2022-09-10 RX ADMIN — INSULIN LISPRO 4 UNITS: 100 INJECTION, SOLUTION INTRAVENOUS; SUBCUTANEOUS at 08:43

## 2022-09-10 RX ADMIN — TRAZODONE HYDROCHLORIDE 150 MG: 100 TABLET ORAL at 20:18

## 2022-09-10 RX ADMIN — ATORVASTATIN CALCIUM 40 MG: 40 TABLET, FILM COATED ORAL at 20:18

## 2022-09-10 RX ADMIN — VENLAFAXINE HYDROCHLORIDE 150 MG: 150 CAPSULE, EXTENDED RELEASE ORAL at 11:57

## 2022-09-10 RX ADMIN — INSULIN GLARGINE 30 UNITS: 100 INJECTION, SOLUTION SUBCUTANEOUS at 08:44

## 2022-09-10 RX ADMIN — FLUTICASONE PROPIONATE 1 SPRAY: 50 SPRAY, METERED NASAL at 11:31

## 2022-09-10 RX ADMIN — INSULIN LISPRO 1 UNITS: 100 INJECTION, SOLUTION INTRAVENOUS; SUBCUTANEOUS at 16:52

## 2022-09-10 RX ADMIN — INSULIN LISPRO 2 UNITS: 100 INJECTION, SOLUTION INTRAVENOUS; SUBCUTANEOUS at 12:40

## 2022-09-10 RX ADMIN — POTASSIUM CHLORIDE: 2 INJECTION, SOLUTION, CONCENTRATE INTRAVENOUS at 11:56

## 2022-09-10 RX ADMIN — DEXTROSE AND SODIUM CHLORIDE: 5; 450 INJECTION, SOLUTION INTRAVENOUS at 04:52

## 2022-09-10 RX ADMIN — Medication 2 PUFF: at 20:27

## 2022-09-10 RX ADMIN — MIRTAZAPINE 7.5 MG: 15 TABLET, FILM COATED ORAL at 20:18

## 2022-09-10 RX ADMIN — SODIUM CHLORIDE 40 MG: 9 INJECTION INTRAMUSCULAR; INTRAVENOUS; SUBCUTANEOUS at 20:17

## 2022-09-10 RX ADMIN — SODIUM BICARBONATE 50 MEQ: 84 INJECTION, SOLUTION INTRAVENOUS at 11:13

## 2022-09-10 ASSESSMENT — PAIN SCALES - GENERAL
PAINLEVEL_OUTOF10: 0

## 2022-09-10 NOTE — PROGRESS NOTES
09/10/22 1310   Encounter Summary   Encounter Overview/Reason  Spiritual/Emotional Needs   Service Provided For: Patient   Referral/Consult From: Rounding   Complexity of Encounter Low   Spiritual/Emotional needs   Type Spiritual Support   Assessment/Intervention/Outcome   Assessment Unable to assess  (pt confused, lethargic)   Intervention Prayer (assurance of)/[place]

## 2022-09-10 NOTE — PROGRESS NOTES
[id number] [email or web address]    PROGRESS NOTE             9/10/2022    7:23 AM    Name:   Trae Felipe  MRN:     [id number]     Acct:      [de-identified]   Room:   2007/2007-01  IP Day:  2  Admit Date:  9/8/2022 11:01 AM    PCP:  Babar Ennis MD  Code Status:  Full Code    Subjective:     C/C:   Chief Complaint   Patient presents with    Hyperglycemia    Altered Mental Status     Interval History Status: improved. Patient was seen and evaluated at bedside. She is arousable but remains confused, does not answer questions or follow commands   Her kidney function and electrolite profile have been improving, most recent creatinine is, sodium thrending down in a safe rate. Nephrology following. Patient was bridged from insulin drip to s/c.   ECHO came back negative for any structural heart disease. Brief History:     The patient is a poor historian due to altered mental status. The patient is a 56 y.o. Non- / non  female with a history of asthma, bipolar 1 disorder, hypertension, stroke, polysubstance abuse, DM 2, degenerative disc disease, and depression with psychotic features, who presented to the ED with altered mental status. The patient was found unresponsive at her home where she lives alone. The patient had not been taking her insulin as her refrigerator was filled with unused insulin vials. The patients family had not heard from the patient for several days and EMS was called. The patient was found confused and obtunded, hypotensive, and hyperglycemic. The ED, the patient was hypotensive (BP 86/64) and tachypneic (RR 24). Blood glucose was 1,034. Serum potassium was 5.5, bicarbonate was 19 mmol/L [anion gap 25 mmol/L). Cr was 3.56 (baseline Cr 0.73). Levophed was administered. The patient was admitted for management of DKA and DKA protocol was initiated.      Review of Systems:     Unable to perform due to disorientation and somnolent state. Medications: Allergies:     Allergies   Allergen Reactions    Bactrim [Sulfamethoxazole-Trimethoprim] Swelling    Adhesive Tape Rash       Current Meds:   Scheduled Meds:    pantoprazole (PROTONIX) 40 mg injection  40 mg IntraVENous BID    insulin lispro  0-4 Units SubCUTAneous TID WC    insulin lispro  0-4 Units SubCUTAneous Nightly    atorvastatin  40 mg Oral Nightly    budesonide-formoterol  2 puff Inhalation BID    [Held by provider] insulin glargine  30 Units SubCUTAneous BID    [Held by provider] metFORMIN  1,000 mg Oral BID WC    mirtazapine  7.5 mg Oral Nightly    traZODone  150 mg Oral Nightly    venlafaxine  150 mg Oral Daily with breakfast    fluticasone  1 spray Each Nostril Daily     Continuous Infusions:    sodium chloride      dextrose      dextrose      norepinephrine       PRN Meds: glucose, dextrose bolus **OR** dextrose bolus, glucagon (rDNA), dextrose, glucose, dextrose bolus **OR** dextrose bolus, glucagon (rDNA), dextrose, albuterol sulfate HFA, dextrose bolus **OR** dextrose bolus, sodium phosphate IVPB **OR** sodium phosphate IVPB **OR** sodium phosphate IVPB, polyethylene glycol, haloperidol lactate    Data:     Past Medical History:   has a past medical history of Acid reflux, Acute cystitis with hematuria, Acute non-recurrent maxillary sinusitis, Asthma, Bipolar 1 disorder ([facility] [address] Bipolar disorder, mixed ([facility] [address] BMI 34.0-34.9,adult, Cannabis use disorder, severe, dependence ([facility] [address] Cerebrovascular accident (CVA) ([facility] [address] Chest pain, Chronic renal insufficiency, Cocaine abuse ([facility] [address] COVID-19 virus RNA test result unknown, DDD (degenerative disc disease), cervical, Diabetes mellitus ([facility] [address] Dizziness, Fibromyalgia, History of stroke, Homicidal ideation, Hyperglycemia, Hypertension, Hypotension, IDDM (insulin dependent diabetes mellitus), Lupus ([facility] [address] Migraine, Neuropathy, Neuropathy, Polysubstance abuse ([facility] [address] Post traumatic stress disorder (PTSD), Posttraumatic stress disorder, Recurrent depression ([facility] [address] Recurrent major depressive disorder, in partial remission ([facility] [address] Screening mammogram, encounter for, Severe recurrent major depression with psychotic features ([facility] [address] Severe recurrent major depression without psychotic features ([facility] [address] Stroke (cerebrum) ([facility] [address] Stroke ([facility] [address] Suicidal ideation, Suicidal intent, Vitamin D deficiency, and White matter changes. Social History:   reports that she has never smoked. She has never used smokeless tobacco. She reports that she does not currently use alcohol. She reports current drug use. Drugs: Marijuana (Weed) and Cocaine. Family History:   Family History   Problem Relation Age of Onset    Diabetes Mother     Stroke Mother     Diabetes Father     Diabetes Sister     Diabetes Brother     Other Son         GSW    No Known Problems Sister        Vitals:  /61   Pulse (!) 111   Temp 99 °F (37.2 °C) (Oral)   Resp 24   Ht 5' 5\" (1.651 m)   Wt 180 lb 12.4 oz (82 kg)   LMP  (LMP Unknown)   SpO2 96%   BMI 30.08 kg/m²   Temp (24hrs), Av °F (37.2 °C), Min:98.5 °F (36.9 °C), Max:99.7 °F (37.6 °C)    Recent Labs     22  1401 22  1532 22  1749 22   POCGLU 161* 117* 170* 224*       I/O(24Hr):     Intake/Output Summary (Last 24 hours) at 9/10/2022 0723  Last data filed at 9/10/2022 7377  Gross per 24 hour   Intake 6990.03 ml   Output 2050 ml   Net 4940.03 ml       Labs:    [unfilled]    Lab Results   Component Value Date/Time    SPECIAL NOT REPORTED 2022 12:47 PM     Lab Results   Component Value Date/Time    CULTURE NO GROWTH 5 DAYS 2022 12:18 PM       [unfilled]    Radiology:    XR ABDOMEN (KUB) (SINGLE AP VIEW)    Result Date: 2022  EXAMINATION: ONE SUPINE XRAY VIEW(S) OF THE ABDOMEN 2022 9:33 am COMPARISON: 2022, 2022 HISTORY: ORDERING SYSTEM PROVIDED HISTORY: NGT placement, dark liquid coming through NG TECHNOLOGIST PROVIDED HISTORY: NGT placement, dark liquid coming through NG Reason for Exam: NGT placement, dark liquid coming through NG FINDINGS: Enteric tube coiled in the body of the stomach. Nonobstructive bowel gas pattern. Mild stool burden. Multiple external devices project over the patient. No acute osseous abnormality identified. Enteric tube coiled at the level of the body of the stomach. Nonobstructive bowel gas pattern. CT HEAD WO CONTRAST    Result Date: 9/8/2022  EXAMINATION: CT OF THE HEAD WITHOUT CONTRAST  9/8/2022 11:38 am TECHNIQUE: CT of the head was performed without the administration of intravenous contrast. Automated exposure control, iterative reconstruction, and/or weight based adjustment of the mA/kV was utilized to reduce the radiation dose to as low as reasonably achievable. COMPARISON: MRI 08/04/2022, 08/03/2022 and CT 08/03/2022. HISTORY: ORDERING SYSTEM PROVIDED HISTORY: Mental Status Changes TECHNOLOGIST PROVIDED HISTORY: Mental Status Changes Decision Support Exception - unselect if not a suspected or confirmed emergency medical condition->Emergency Medical Condition (MA) Is the patient pregnant?->No Reason for Exam: AMS Additional signs and symptoms: AMS FINDINGS: BRAIN/VENTRICLES: There is no acute intracranial hemorrhage, mass effect or midline shift. No abnormal extra-axial fluid collection. Encephalomalacia in the left frontal lobe demonstrated corresponding to recently demonstrated ischemia. Cortical atrophy and chronic white matter changes in the brain and associated ventricular enlargement are again demonstrated. ORBITS: The visualized portion of the orbits demonstrate no acute abnormality. SINUSES: The visualized paranasal sinuses and mastoid air cells demonstrate no acute abnormality. SOFT TISSUES/SKULL:  No acute abnormality of the visualized skull or soft tissues. Chronic findings in the brain without acute CT abnormality identified.  Expected evolution of recently demonstrated ischemia in the left frontal lobe. XR CHEST PORTABLE    Result Date: 9/8/2022  EXAMINATION: ONE XRAY VIEW OF THE CHEST 9/8/2022 7:53 pm COMPARISON: 08/17/2022 HISTORY: ORDERING SYSTEM PROVIDED HISTORY: line placement TECHNOLOGIST PROVIDED HISTORY: line placement Reason for Exam: Line placement FINDINGS: Central venous catheter tip overlies the right atrium. No pleural effusion or pneumothorax. Heart size is at the upper limits of normal.     Right central venous catheter tip overlies the right atrium. No pneumothorax. XR CHEST PORTABLE    Result Date: 8/17/2022  EXAMINATION: ONE XRAY VIEW OF THE CHEST 8/17/2022 8:09 pm COMPARISON: None. HISTORY: ORDERING SYSTEM PROVIDED HISTORY: chest pain TECHNOLOGIST PROVIDED HISTORY: chest pain FINDINGS: Chest radiograph: The cardiomediastinal silhouette and hilar contours are normal. The lungs are clear with no focal consolidation, pleural effusion or pneumothorax. The overlying soft tissue and osseous structures do not demonstrate acute abnormality. No acute intrathoracic pathology. US RETROPERITONEAL COMPLETE    Result Date: 9/8/2022  EXAMINATION: RETROPERITONEAL ULTRASOUND OF THE KIDNEYS AND URINARY BLADDER 9/8/2022 COMPARISON: None HISTORY: ORDERING SYSTEM PROVIDED HISTORY: Acute kidney injury TECHNOLOGIST PROVIDED HISTORY: Acute kidney injury 28-year-old female with acute kidney injury FINDINGS: Kidneys: Right kidney measures 10.4 x 4.3 x 5.0 cm. Right renal cortical thickness measures 1.7 cm. Left kidney measures 9.4 x 4.8 x 4.8 cm. Left renal cortical thickness measures 1.8 cm. No hydronephrosis or perinephric fluid. No echogenic foci with posterior acoustic shadowing to suggest renal calculi. Gross preservation of the bilateral corticomedullary differentiation. Bladder: Not imaged. Unremarkable renal ultrasound. No hydronephrosis. Physical Examination:        Constitutional:       Appearance: She is ill-appearing.    HENT:      Head: Normocephalic and atraumatic. Nose: No congestion or rhinorrhea. Eyes:      Extraocular Movements: Extraocular movements intact. Conjunctiva/sclera: Conjunctivae normal.      Pupils: Pupils are equal, round, and reactive to light. Cardiovascular:      Rate and Rhythm: Tachycardia present. Pulses: Normal pulses. Heart sounds: Normal heart sounds. No murmur heard. No friction rub. No gallop. Pulmonary:      Effort: Pulmonary effort is normal. No respiratory distress. Breath sounds: Normal breath sounds. No wheezing or rales. Abdominal:      General: Abdomen is flat. Bowel sounds are normal. There is no distension. Palpations: There is no mass. Tenderness: There is no guarding. Musculoskeletal:      Right lower leg: No edema. Left lower leg: No edema. Skin:     Capillary Refill: Capillary refill takes less than 2 seconds. Neurological:      General: No focal deficit present. Mental Status: She is somnolent and disoriented. Sensory: No sensory deficit. Motor: No weakness. Psychiatric: was not accessed due to altered mental status.       Assessment:        Primary Problem  DKA, type 1, not at goal [facility])    Active Hospital Problems    Diagnosis Date Noted    Uremic encephalopathy [G93.49, N19] 09/09/2022     Priority: Medium    DKA, type 1, not at goal [facility]) [E10.10] 09/08/2022     Priority: Medium    DKA, type 2, not at goal [facility]) [E11.10] 09/08/2022     Priority: Medium    Cocaine abuse ([facility] [address] [F14.10] 01/05/2018       Plan:        DKA, 2/2 poor insulin compliance  -Glucose on admssion: 1,034; glucose today 409  -this morning Na+: 157; K+ 3.5;   -NPO effective immediately  -DKA protocol started  -BMP q4h, initial Mag and Phos levels  -Glucose checks every hour  -Betahydroxybutarate levels: 5.47  -Home Lantus 30 units BID held  -IV Normal Saline at 250 mL/hr  -D5 and 0.45% NS at 150 mL/hr when blood glucose less than 250 mg/dL  -Will monitor bicarb and anion gap, bridge with home lantus dose and begin diet PO when bicarb >15 and gap closed x 2 measurements  -Discontinue Insulin drip   -Hypoglycemia, phos and potassium replacement protocols in place   -Diabetes educator consulted   -General surgery placed central line     Acute Kidney Injury pre-renal azotemia secondary to dehydration  -Baseline Cr 0.73; Cr on admission: 3.56; Cr today 1.47  -Change IV NS infusion to 0.45 NS at 150 ml/hr  -BMP q4 hours  -monitor urine output  -Nephrology consulted      Hypotension secondary to dehydration by osmotic diuresis  -BP today:113/61  -Free water deficit 10 L  -Continue NS infusion     Hypernatremia  -Na+ this mornin;   -Free water deficit 10 L  -NG tube placed for free water administration (on hold)  -Continue IV NS infusion to 0.45 NS at 150 ml/hr      Acute metabolic encephalopathy s/s to electrolyte abnormality  -Restraints in place; patient oriented to person only  -Nephrology on board; electrolyte replacement       Polysubstance abuse  -Urine tox positive for Fentanyl and cocaine  -Psychiatry consulted      Ventricular tachycardia  -Non-sustained VT secondary to electrolye imbalance, metabolic derangement and cocaine  -Continue to manage hyperglycemia and electrolytes  -Discontinue metoprolol  -Echo ordered: Estimated LV EF 60-65  -Cardiology on board.       Depression with psychotic features  -Continue Haloperidol lactate 5 mg IM q 6 hours PRN  -Continue Mirtazapine 7.5 mg po nightly  -Continue Trazodone 150 mg po nightly  -Continue Venlafaxine 150 mg po daily  -Psychiatry consulted     Asthma  -Continue home Proventil ventolin 2 puff q 4 hours as needed   -Continue home Symbicort 2 puffs BID  -Continue home Flonase 1 spray each nostril daily      Code: Full code  Diet: NPO; NG tube in place  DVT prophylaxis: on hold    Sangita Moon MD  9/10/2022  7:23 AM   Attending Physician Statement  I have discussed the care of [address] [place] [address] and I have examined the patient myselft and taken ros and hpi , including pertinent history and exam findings,  with the resident. I have reviewed the key elements of all parts of the encounter with the resident. I agree with the assessment, plan and orders as documented by the resident. [id number]   61-year-old -American lady with history of diabetes lives alone positive for cocaine family called EMS because they did not hear from her admitted to severe metabolic acidosis pH of 7.1 with a diabetic ketoacidosis acute toxic metabolic encephalopathy secondary to acidosis and also severe life-threatening hyper natremia with a sodium of 170or more  Also had acute renal failure with creatinine more than 3.5  Patient is in restraint due to risk of pulling lines and tubes patient has a right IJ central line and an NG tube in place passed bedside swallow study will start diet  No family at bedside  Will order EEG rule out seizure disorder due to mentation not improving with improvement in electrolytes  CT head was negative for any acute changes  Will monitor the CVP pressures nephrology input noted appreciated with the free water through NG tube and also IV fluids  Off insulin drip  Patient is still critically ill no family at bedside  Will need MRI kali to eval for pitutary abnor  Rule out DI    Electronically signed by Lucy Wheeler MD

## 2022-09-10 NOTE — CONSULTS
Department of Psychiatry   Psychiatric Assessment      Thank you very much for allowing us to participate in the care of this patient. Reason for Consult: Medication management    HISTORY OF PRESENT ILLNESS:    Patient is a 57-year-old female with history of mood disorder and cocaine abuse admitted for altered mentation. Per report she was found unresponsive in a home where she lives. Patient has history of similar presentations after not taking her insulin. Patient is exhibiting fluctuating cognition here. Reports that she has been compliant on her medications. Denies any overt feelings of sad down or depression. She was however tearful when I discussed with her about her  passing away and our conversation we had during consult last year. Was very somnolent during the conversation and was unwilling to participate much in the interview. PSYCHIATRIC HISTORY:      Patient does report that she has been actively following up with her outpatient psychiatrist and has been compliant on her medications. She has 1 suicide attempt in the past and 1 inpatient psychiatric hospitalization    [facility] Psychiatric Review of Systems could not assess due to her current mentation however per record       Obsessions and Compulsions: Denies       Jennifer or Hypomania: Denies     Hallucinations: Denies     Panic Attacks:  Denies     Delusions:  Denies     Phobias:  Denies     Trauma: Denies    Prior to Admission medications    Medication Sig Start Date End Date Taking?  Authorizing Provider   insulin glargine (LANTUS SOLOSTAR) 100 UNIT/ML injection pen Inject 30 Units into the skin 2 times daily 4/22/22   Patricia Collazo MD   atorvastatin (LIPITOR) 40 MG tablet Take 1 tablet by mouth nightly 4/22/22 9/8/22  Patricia Collazo MD   acetaminophen (TYLENOL) 500 MG tablet Take 2 tablets by mouth 3 times daily as needed for Pain  Patient not taking: Reported on 8/8/2022 3/15/22   Patricia Collazo MD   fluticasone (FLONASE) 50 MCG/ACT nasal spray 1 spray by Each Nostril route daily 3/15/22   Aidee Joya MD   metFORMIN (GLUCOPHAGE) 1000 MG tablet Take 1 tablet by mouth 2 times daily (with meals) 3/15/22   Aidee Joya MD   mirtazapine (REMERON) 7.5 MG tablet Take 1 tablet by mouth nightly 3/15/22   Aidee Joya MD   traZODone (DESYREL) 150 MG tablet Take 1 tablet by mouth nightly 3/15/22   Aidee Joya MD   venlafaxine (EFFEXOR XR) 150 MG extended release capsule Take 1 capsule by mouth daily (with breakfast) 3/15/22   Aidee Joya MD   Alcohol Swabs 70 % PADS Use 1 swab 3 times daily as needed 3/15/22   Aidee Joya MD   blood glucose monitor strips Test 3 times a day & as needed for symptoms of irregular blood glucose. Dispense sufficient amount for indicated testing frequency plus additional to accommodate PRN testing needs. 3/15/22   Aidee Joya MD   Lancets MISC 1 each by Does not apply route 3 times daily 3/15/22   Aidee Joya MD   Insulin Pen Needle ([employer] PEN NEEDLES 31G) 31G X 8 MM MISC 1 each by Does not apply route daily 3/15/22   Aidee Joya MD   FREESTYLE LITE strip 1 each by Does not apply route 3 times daily 3/15/22   Aidee Joya MD   budesonide-formoterol (SYMBICORT) 80-4.5 MCG/ACT AERO Inhale 2 puffs into the lungs 2 times daily 3/15/22   Aidee Joya MD   albuterol sulfate  (90 Base) MCG/ACT inhaler Inhale 2 puffs into the lungs every 4 hours as needed for Wheezing 3/15/22 4/15/22  Aidee Joya MD   mupirocin (BACTROBAN) 2 % ointment Apply topically 3 times daily Apply topically to right foot wound two times a day for wound care. Apply to right foot wound, cover with Kerlix and ace wrap. Historical Provider, MD [place].  Devices MISC 1 PAIR OF DIABETIC SHOES (1 LEFT/ 1 RIGHT)  1-3 PAIRS OF INSERTS (LEFT/ RIGHT) 2/15/22   Sandip Ribeiro DPM   dicyclomine (BENTYL) 10 MG capsule Take 1 capsule by mouth 4 times daily 12/17/21 Saintclair Marlin, MD        Medications:    Current Facility-Administered Medications: potassium chloride 20 mEq in sodium chloride 0.45 % 1,000 mL infusion, , IntraVENous, Continuous  pantoprazole (PROTONIX) 40 mg in sodium chloride (PF) 10 mL injection, 40 mg, IntraVENous, BID  insulin lispro (HUMALOG) injection vial 0-4 Units, 0-4 Units, SubCUTAneous, TID WC  insulin lispro (HUMALOG) injection vial 0-4 Units, 0-4 Units, SubCUTAneous, Nightly  glucose chewable tablet 16 g, 4 tablet, Oral, PRN  dextrose bolus 10% 125 mL, 125 mL, IntraVENous, PRN **OR** dextrose bolus 10% 250 mL, 250 mL, IntraVENous, PRN  dextrose 10 % infusion, , IntraVENous, Continuous PRN  glucagon (rDNA) injection 1 mg, 1 mg, SubCUTAneous, PRN  dextrose 10 % infusion, , IntraVENous, Continuous PRN  albuterol sulfate HFA (PROVENTIL;VENTOLIN;PROAIR) 108 (90 Base) MCG/ACT inhaler 2 puff, 2 puff, Inhalation, Q4H PRN  atorvastatin (LIPITOR) tablet 40 mg, 40 mg, Oral, Nightly  budesonide-formoterol (SYMBICORT) 80-4.5 MCG/ACT inhaler 2 puff, 2 puff, Inhalation, BID  insulin glargine (LANTUS) injection vial 30 Units, 30 Units, SubCUTAneous, BID  [Held by provider] metFORMIN (GLUCOPHAGE) tablet 1,000 mg, 1,000 mg, Oral, BID WC  mirtazapine (REMERON) tablet 7.5 mg, 7.5 mg, Oral, Nightly  traZODone (DESYREL) tablet 150 mg, 150 mg, Oral, Nightly  venlafaxine (EFFEXOR XR) extended release capsule 150 mg, 150 mg, Oral, Daily with breakfast  fluticasone (FLONASE) 50 MCG/ACT nasal spray 1 spray, 1 spray, Each Nostril, Daily  sodium phosphate 10 mmol in sodium chloride 0.9 % 250 mL IVPB, 10 mmol, IntraVENous, PRN **OR** sodium phosphate 15 mmol in dextrose 5 % 250 mL IVPB, 15 mmol, IntraVENous, PRN **OR** sodium phosphate 20 mmol in dextrose 5 % 500 mL IVPB, 20 mmol, IntraVENous, PRN  polyethylene glycol (GLYCOLAX) packet 17 g, 17 g, Oral, Daily PRN  haloperidol lactate (HALDOL) injection 5 mg, 5 mg, IntraMUSCular, Q6H PRN  norepinephrine (LEVOPHED) 16 mg in sodium chloride 0.9 % 250 mL infusion, 1-100 mcg/min, IntraVENous, Continuous     Past Medical History:        Diagnosis Date    Acid reflux 5/29/2017    Acute cystitis with hematuria 10/11/2016    Acute non-recurrent maxillary sinusitis 11/28/2017    Asthma     Bipolar 1 disorder ([address] 1/4/2018    Bipolar disorder, mixed ([address]     BMI 34.0-34.9,adult 11/28/2017    Cannabis use disorder, severe, dependence ([address] 12/19/2017    Cerebrovascular accident (CVA) ([address] 6/14/2017    Chest pain 11/5/2016    Chronic renal insufficiency     Cocaine abuse ([address] 1/5/2018    COVID-19 virus RNA test result unknown     DDD (degenerative disc disease), cervical     Diabetes mellitus ([address]     Dizziness 6/13/2017    Fibromyalgia     History of stroke 9/9/2017    Homicidal ideation 11/6/2017    Hyperglycemia     Hypertension     Hypotension 1/18/2019    IDDM (insulin dependent diabetes mellitus) 12/21/2015    Lupus ([address]     Migraine     Neuropathy     Neuropathy     Polysubstance abuse ([address] 9/17/2017    Post traumatic stress disorder (PTSD)     Posttraumatic stress disorder 5/29/2017    Recurrent depression ([address] 5/29/2017    Recurrent major depressive disorder, in partial remission ([address] 11/28/2017    Screening mammogram, encounter for 11/28/2017    Severe recurrent major depression with psychotic features ([address] 12/19/2017    Severe recurrent major depression without psychotic features ([address] 12/19/2017    Stroke (cerebrum) ([address] 6/14/2017    Stroke ([address]     per chart notes    Suicidal ideation     Suicidal intent 3/10/2017    Vitamin D deficiency 11/28/2017    White matter changes 6/13/2017       Past Surgical History:        Procedure Laterality Date    ABDOMEN SURGERY      drain tube    ABSCESS DRAINAGE      right buttock    CATARACT REMOVAL WITH IMPLANT Bilateral     CHEST TUBE INSERTION      FINGER AMPUTATION Left 03/13/2021    LEFT RING FINER AMPUTATION performed by Kaylan Mullins MD at [address] Right 10/11/2021    AMPUTATION RIGHT INDEX FINGER performed by Val Samaniego MD at [address] Right 1/27/2022    FOOT ABSCESS INCISION AND DRAINAGE  (PULSE LAVAGE, CULTURE SWABS) performed by Phillip Bennett DPM at [address] [facility] Right 01/27/2022    FOOT ABSCESS INCISION AND DRAINAGE (PULSE LAVAGE, CULTURE SWABS) - Right    HAND SURGERY Right 09/14/2021    I&D INDEX FINGER performed by Val Samaniego MD at [address] Right 09/15/2021    I&D INDEX FINGER performed by Val Samaniego MD at [address]  2/3/2022         LASIK Bilateral        Allergies: Bactrim [sulfamethoxazole-trimethoprim] and Adhesive tape      Social History:    Per record  Patient is born and raised around [facility]. Currently lives alone. Reports that she does get disability. Identifies very poor support from any friends or family members.     SUBSTANCE USE HISTORY: History of cocaine abuse    Family Medical and Psychiatric History:             Problem Relation Age of Onset    Diabetes Mother     Stroke Mother     Diabetes Father     Diabetes Sister     Diabetes Brother     Other Son         GSW    No Known Problems Sister        Physical  /66   Pulse (!) 101   Temp 99.3 °F (37.4 °C) (Axillary)   Resp 21   Ht 5' 5\" (1.651 m)   Wt 198 lb 8 oz (90 kg)   LMP  (LMP Unknown)   SpO2 97%   BMI 33.03 kg/m²     Mental Status Examination:  Level of consciousness: Somnolent  Appearance: hospital attire, lying in bed, fair grooming  Behavior/Motor: Psychomotor retardation  Attitude toward examiner: No eye contact, mostly indifferent  Speech: Soft monotone  Mood: Anxious  Affect: Somewhat labile  Thought processes: Significant thought disorganization  Thought content: Denies suicidal ideations   Denies homicidal ideations    Denies hallucinations     Cognition:  Oriented to self, situation, not to location, date  Concentration poor  Insight & Judgment:  fair    DSM-5 DIAGNOSIS:  Mood disorder unspecified    Stressors     Severity of stressors is mild  Source of stressors include: Other psychosocial and environmental stressors    PLAN:    Continue current psychotropic medications. Does not require any admission to [facility]. We will sign off. No further recommendations from psych at this time. Thank you very much for allowing us to participate in the care of this patient.       Electronically signed by Angel Adams MD on 9/10/22 at 1:24 PM EDT

## 2022-09-10 NOTE — PROGRESS NOTES
Department of Internal Medicine  Nephrology Tomás Worley MD  Progress Note    Reason for consultation: Management of acute kidney injury. Consulting physician: Jeanmarie Patel MD.    Interval history: Patient is confused and disoriented but not in respiratory distress. Laboratory studies reveal positive TATIANA, SANDY but negative antidouble-stranded DNA. She is nonoliguric. Laboratory studies were reviewed. History of present illness: This is a 56 y.o. female with a significant past medical history of Severe bipolar disorder, type 2 diabetes mellitus [insulin requiring], cannabis and cocaine abuse, systemic hypertensionFibromyalgia and s/p prior cerebrovascular accident, who was found unresponsive at home where she lives alone. She had apparently not been taking her insulin as her refrigerator was filled with unused insulin vials. Place was activated after family had not heard from patient for several days and when EMS arrived, patient was pretty confused and obtunded with severe hypotension and BP could not be obtained initially. Blood glucose was greater than 1000 mg/dL. Blood pressure in the emergency department was 86/64 mmHg with pulse 100 and respiratory rate 24/min. Laboratory studies were remarkable for hemoconcentration, serum potassium 5.5 mmol/L, blood glucose 1036 mg/dL, serum bicarbonate 19 mmol/L [anion gap 25 mmol/L] and elevated BUN/creatinine 129/3.56 mg/dL and hence nephrology consultation. At the time of this encounter, patient remained confused and appears quite dry and delirious to occasionally lucid.     Scheduled Meds:   pantoprazole (PROTONIX) 40 mg injection  40 mg IntraVENous BID    insulin lispro  0-4 Units SubCUTAneous TID WC    insulin lispro  0-4 Units SubCUTAneous Nightly    atorvastatin  40 mg Oral Nightly    budesonide-formoterol  2 puff Inhalation BID    insulin glargine  30 Units SubCUTAneous BID    [Held by provider] metFORMIN  1,000 mg Oral BID WC    mirtazapine 7.5 mg Oral Nightly    traZODone  150 mg Oral Nightly    venlafaxine  150 mg Oral Daily with breakfast    fluticasone  1 spray Each Nostril Daily     Continuous Infusions:   sodium chloride 150 mL/hr at 09/10/22 0749    dextrose      dextrose      norepinephrine       PRN Meds:.glucose, dextrose bolus **OR** dextrose bolus, dextrose, glucagon (rDNA), dextrose, albuterol sulfate HFA, sodium phosphate IVPB **OR** sodium phosphate IVPB **OR** sodium phosphate IVPB, polyethylene glycol, haloperidol lactate    Physical Exam:    VITALS:  /61   Pulse (!) 111   Temp 98.7 °F (37.1 °C) (Oral)   Resp 24   Ht 5' 5\" (1.651 m)   Wt 180 lb 12.4 oz (82 kg)   LMP  (LMP Unknown)   SpO2 96%   BMI 30.08 kg/m²   TEMPERATURE:  Current - Temp: 98.7 °F (37.1 °C); Max - Temp  Av °F (37.2 °C)  Min: 98.5 °F (36.9 °C)  Max: 99.7 °F (37.6 °C)  RESPIRATIONS RANGE: Resp  Av.8  Min: 20  Max: 36  PULSE RANGE: Pulse  Av.9  Min: 107  Max: 117  BLOOD PRESSURE RANGE:  Systolic (74WEE), YWD:943 , Min:76 , YXU:522 ; Diastolic (08IBT), KI, Min:42, Max:69  PULSE OXIMETRY RANGE: SpO2  Av.9 %  Min: 95 %  Max: 99 %  24HR INTAKE/OUTPUT:    Intake/Output Summary (Last 24 hours) at 9/10/2022 1010  Last data filed at 9/10/2022 0900  Gross per 24 hour   Intake 7290.03 ml   Output 2050 ml   Net 5240.03 ml       Constitutional: delirious, distracted, flushed, and toxic    Skin: Skin color, texture, turgor normal. No rashes or lesions    Head: Normocephalic, without obvious abnormality, atraumatic     Cardiovascular/Edema: regular rate and rhythm, S1, S2 normal, no murmur, click, rub or gallop    Respiratory: Lungs: clear to auscultation bilaterally    Abdomen: soft, non-tender; bowel sounds normal; no masses,  no organomegaly    Back: symmetric, no curvature. ROM normal. No CVA tenderness. Extremities: extremities normal, atraumatic, no cyanosis or edema;  Amputation of the left fourth and fifth toe as well as fourth metatarsal.    Neuro:  Grossly normal    CBC:   Recent Labs     09/08/22  1124 09/09/22  0441 09/09/22  1750 09/10/22  0457   WBC 10.9 3.5  --  9.3   HGB 14.4 10.3* 9.6* 8.6*    261  --  153       BMP:    Recent Labs     09/09/22  1038 09/09/22  1415 09/09/22  1750 09/10/22  0040 09/10/22  0457   * 169* 167* 160* 157*  157*   K 4.2 3.5*  --   --  3.5*   * >140*  --   --  132*   CO2 20 18*  --   --  16*   BUN 96* 86*  --   --  61*   CREATININE 2.18* 1.92*  --   --  1.47*   GLUCOSE 229* 165*  --   --  409*       Lab Results   Component Value Date/Time    NITRU NEGATIVE 09/08/2022 04:45 PM    COLORU Yellow 09/08/2022 04:45 PM    PHUR 5.5 09/08/2022 04:45 PM    WBCUA 6 TO 9 09/08/2022 04:45 PM    RBCUA 0 TO 2 09/08/2022 04:45 PM    MUCUS NOT REPORTED 01/24/2022 12:04 PM    TRICHOMONAS NOT REPORTED 01/24/2022 12:04 PM    YEAST FEW 08/04/2022 02:30 PM    BACTERIA FEW 08/04/2022 02:30 PM    CLARITYU clear 07/12/2021 09:57 AM    SPECGRAV 1.015 09/08/2022 04:45 PM    LEUKOCYTESUR NEGATIVE 09/08/2022 04:45 PM    UROBILINOGEN Normal 09/08/2022 04:45 PM    BILIRUBINUR NEGATIVE  Verified by ictotest. 09/08/2022 04:45 PM    BILIRUBINUR negative 07/12/2021 09:57 AM    BLOODU negative 07/12/2021 09:57 AM    GLUCOSEU 2+ 09/08/2022 04:45 PM    KETUA SMALL 09/08/2022 04:45 PM    AMORPHOUS NOT REPORTED 01/24/2022 12:04 PM     Urine Sodium:     Lab Results   Component Value Date/Time    EMILIO <20 09/08/2022 05:28 PM     Urine Protein:     Lab Results   Component Value Date/Time    TPU 13 07/08/2020 11:19 AM     Urine Creatinine:     Lab Results   Component Value Date/Time    LABCREA 121.0 09/08/2022 05:28 PM     IMPRESSION/RECOMMENDATIONS:      1. Acute kidney injury - most consistent with prerenal azotemia secondary to dehydration from osmotic diuresis as well as ischemic acute tubular necrosis secondary to hypotension.  Random urine electrolytes are however more consistent with prerenal azotemia or acute glomerulonephritis. Urine output and renal function continue to improve. Plan: Continue IVF 0.45 normal saline at 150 ml/hr. Strict urine output documentation. Basic metabolic profile daily. 2.  Diabetic ketoacidosis - secondary to poor compliance. Anion gap is closed but acidosis persists. Continue to hold metformin. 3.  Blood pressure is adequate    4. Hypernatremia - estimated free water deficit 10 L. Continue IV fluids as ordered as well as free water per NG tube 300 mL every 4 hours. 5.  Hypophosphatemia - secondary to insulin. We will replace with sodium phosphate IV. Check serum phosphorus. Prognosis is guarded.     Smitha Garcia MD FACP  Attending Nephrologist  9/10/2022 10:10 AM

## 2022-09-10 NOTE — CONSULTS
5/29/2017    Recurrent major depressive disorder, in partial remission ([address] 11/28/2017    Screening mammogram, encounter for 11/28/2017    Severe recurrent major depression with psychotic features ([address] 12/19/2017    Severe recurrent major depression without psychotic features ([address] 12/19/2017    Stroke (cerebrum) ([address] 6/14/2017    Stroke ([address]     per chart notes    Suicidal ideation     Suicidal intent 3/10/2017    Vitamin D deficiency 11/28/2017    White matter changes 6/13/2017     Past Surgical History:   Procedure Laterality Date    ABDOMEN SURGERY      drain tube    ABSCESS DRAINAGE      right buttock    CATARACT REMOVAL WITH IMPLANT Bilateral     CHEST TUBE INSERTION      FINGER AMPUTATION Left 03/13/2021    LEFT RING FINER AMPUTATION performed by Ge Cotton MD at [address] [facility] Right 10/11/2021    AMPUTATION RIGHT INDEX FINGER performed by Ge Cotton MD at [facility] Right 1/27/2022    FOOT ABSCESS INCISION AND DRAINAGE  (PULSE LAVAGE, CULTURE SWABS) performed by Jessa Hamm DPM at [address] [place] [address] Right 01/27/2022    FOOT ABSCESS INCISION AND DRAINAGE (PULSE LAVAGE, CULTURE SWABS) - Right    HAND SURGERY Right 09/14/2021    I&D INDEX FINGER performed by Ge Cotton MD at [facility] Right 09/15/2021    I&D INDEX FINGER performed by Ge Cotton MD at [address] [facility]  2/3/2022         LASIK Bilateral      Family History   Problem Relation Age of Onset    Diabetes Mother     Stroke Mother     Diabetes Father     Diabetes Sister     Diabetes Brother     Other Son         GSW    No Known Problems Sister        Social History:    Social History     Tobacco Use    Smoking status: Never    Smokeless tobacco: Never   Vaping Use    Vaping Use: Never used   Substance Use Topics    Alcohol use: Not Currently    Drug use: Yes     Types: Marijuana Ree Jayant), Cocaine     Comment: + Cocaine 2/2021 also, see Care Everywhere UDS       MEDICATIONS   Allergies: Allergies   Allergen Reactions    Bactrim [Sulfamethoxazole-Trimethoprim] Swelling    Adhesive Tape Rash       Home Medications:  Prior to Admission medications    Medication Sig Start Date End Date Taking? Authorizing Provider   insulin glargine (LANTUS SOLOSTAR) 100 UNIT/ML injection pen Inject 30 Units into the skin 2 times daily 4/22/22   Aidee Joya MD   atorvastatin (LIPITOR) 40 MG tablet Take 1 tablet by mouth nightly 4/22/22 9/8/22  Aidee Joya MD   acetaminophen (TYLENOL) 500 MG tablet Take 2 tablets by mouth 3 times daily as needed for Pain  Patient not taking: Reported on 8/8/2022 3/15/22   Aidee Joya MD   fluticasone (FLONASE) 50 MCG/ACT nasal spray 1 spray by Each Nostril route daily 3/15/22   Aidee Joya MD   metFORMIN (GLUCOPHAGE) 1000 MG tablet Take 1 tablet by mouth 2 times daily (with meals) 3/15/22   Aidee Joya MD   mirtazapine (REMERON) 7.5 MG tablet Take 1 tablet by mouth nightly 3/15/22   Aidee Joya MD   traZODone (DESYREL) 150 MG tablet Take 1 tablet by mouth nightly 3/15/22   Aidee Joya MD   venlafaxine (EFFEXOR XR) 150 MG extended release capsule Take 1 capsule by mouth daily (with breakfast) 3/15/22   Aidee Joya MD   Alcohol Swabs 70 % PADS Use 1 swab 3 times daily as needed 3/15/22   Aidee Joya MD   blood glucose monitor strips Test 3 times a day & as needed for symptoms of irregular blood glucose. Dispense sufficient amount for indicated testing frequency plus additional to accommodate PRN testing needs.  3/15/22   Aidee Joya MD   Lancets MISC 1 each by Does not apply route 3 times daily 3/15/22   Aidee Joya MD   Insulin Pen Needle ([employer] PEN NEEDLES 31G) 31G X 8 MM MISC 1 each by Does not apply route daily 3/15/22   Aidee Joya MD   FREESTYLE LITE strip 1 each by Does not apply route 3 times daily 3/15/22   Aidee Joya MD   budesonide-formoterol (SYMBICORT) 80-4.5 MCG/ACT AERO Inhale 2 puffs into the lungs 2 times daily 3/15/22   Mayito Zuñiga MD   albuterol sulfate  (90 Base) MCG/ACT inhaler Inhale 2 puffs into the lungs every 4 hours as needed for Wheezing 3/15/22 4/15/22  Mayito Zuñiga MD   mupirocin (BACTROBAN) 2 % ointment Apply topically 3 times daily Apply topically to right foot wound two times a day for wound care. Apply to right foot wound, cover with Kerlix and ace wrap. Historical Provider, MD   Misc. Devices MIS 1 PAIR OF DIABETIC SHOES (1 LEFT/ 1 RIGHT)  1-3 PAIRS OF INSERTS (LEFT/ RIGHT) 2/15/22   Arch JuristSARA   dicyclomine (BENTYL) 10 MG capsule Take 1 capsule by mouth 4 times daily 12/17/21   Mayito Zuñiga MD       Current Medications:     pantoprazole (PROTONIX) 40 mg injection  40 mg IntraVENous BID    insulin lispro  0-4 Units SubCUTAneous TID WC    insulin lispro  0-4 Units SubCUTAneous Nightly    atorvastatin  40 mg Oral Nightly    budesonide-formoterol  2 puff Inhalation BID    insulin glargine  30 Units SubCUTAneous BID    [Held by provider] metFORMIN  1,000 mg Oral BID WC    mirtazapine  7.5 mg Oral Nightly    traZODone  150 mg Oral Nightly    venlafaxine  150 mg Oral Daily with breakfast    fluticasone  1 spray Each Nostril Daily       Infusions:   IV infusion builder 150 mL/hr at 09/10/22 1832    dextrose      dextrose         PRNs:   glucose, 4 tablet, PRN  dextrose bolus, 125 mL, PRN   Or  dextrose bolus, 250 mL, PRN  dextrose, , Continuous PRN  glucagon (rDNA), 1 mg, PRN  dextrose, , Continuous PRN  albuterol sulfate HFA, 2 puff, Q4H PRN  sodium phosphate IVPB, 10 mmol, PRN   Or  sodium phosphate IVPB, 15 mmol, PRN   Or  sodium phosphate IVPB, 20 mmol, PRN  polyethylene glycol, 17 g, Daily PRN  haloperidol lactate, 5 mg, Q6H PRN        REVIEW OF SYSTEMS   Somnolent. Unable to get meaningful review of system.      OBJECTIVE DATA   Vitals:    BP (!) 143/69   Pulse 89   Temp 98.8 °F (37.1 °C) (Oral)   Resp 25   Ht 5' 5\" (1.651 m)   Wt 198 lb 8 oz (90 kg)   LMP  (LMP Unknown)   SpO2 97%   BMI 33.03 kg/m²     GEN: Appears lethargic and somnolent. HEENT: EOMI, PERRL, oropharynx non-erythematous without exudate, no oral lesions  LYMPH: no lad  CV: RRR, no murmur, rub, gallop, no edema  PULM: CTAB  ABD: soft, non-tender, non-distended, +BS  NEURO: no focal deficits, moves all 4 extremities spontaneously  SKIN: no rashes  PSYCH: normal affect    LABS AND IMAGING   CBC  Recent Labs     09/08/22  1124 09/09/22  0441 09/09/22  1750 09/10/22  0457 09/10/22  1127   WBC 10.9 3.5  --  9.3  --    HGB 14.4 10.3* 9.6* 8.6* 8.8*   HCT 46.7* 32.3* 29.5* 27.3* 26.9*   .8* 96.3  --  96.8  --     261  --  153  --        BMP  Recent Labs     09/09/22  1038 09/09/22  1415 09/09/22  1750 09/10/22  0040 09/10/22  0457 09/10/22  1127   * 169*   < > 160* 157*  157* 158*   K 4.2 3.5*  --   --  3.5*  --    * >140*  --   --  132*  --    CO2 20 18*  --   --  16*  --    BUN 96* 86*  --   --  61*  --    CREATININE 2.18* 1.92*  --   --  1.47*  --    GLUCOSE 229* 165*  --   --  409*  --    CALCIUM 8.6 8.2*  --   --  7.5*  --     < > = values in this interval not displayed. LFTS  Recent Labs     09/08/22  1124   ALKPHOS 151*   ALT 15   AST 11   PROT 8.9*   BILITOT 0.4   LABALBU 4.0       AMYLASE/LIPASE/AMMONIA  Recent Labs     09/08/22  1124   AMMONIA 12       PT/INR  No results for input(s): PROTIME, INR in the last 72 hours.     ANEMIA STUDIES  Recent Labs     09/10/22  1127   IRON 62   LABIRON 51   TIBC 122*   UIBC 60*   FERRITIN 346*   [id number]   FOLATE 7.7       Radiology    ECHO Complete 2D W Doppler W Color    Result Date: 9/10/2022  [facility] Transthoracic Echocardiography Report (TTE)  Patient Name Lexie Zarco    Date of Study               09/09/2022               ABILIO THOMPSON   Date of      1965  Gender                      Female  Birth   Age          56 year(s)  Race Black   Room Number  2007        Height:                     65 inch, 165.1 cm   Corporate ID [id number]    Weight:                     180 pounds, 81.6 kg  #   Patient Acct [de-identified]   BSA:          1.89 m^2      BMI:      29.95  #                                                              kg/m^2   MR #         [id number]      Sonographer                 Carina Newsome   Accession #  [id number]  Interpreting Physician      [address]   Fellow                   Referring Nurse                           Practitioner   Interpreting             Referring Physician  Fellow  Type of Study   TTE procedure:2D Echocardiogram, M-Mode, Doppler, Color Doppler. Procedure Date Date: 09/09/2022 Start: 12:50 PM Study Location: [facility] Technical Quality: Fair visualization Indications:Tachycardia. Patient Status: Inpatient Height: 65 inches Weight: 180 pounds BSA: 1.89 m^2 BMI: 29.95 kg/m^2 Rhythm: Sinus tachycardia HR: 108 bpm BP: 98/55 mmHg CONCLUSIONS Summary Left ventricle is normal in size and wall thickness. Global left ventricular systolic function is normal. Estimated LV EF 60-65 %. No obvious wall motion abnormality seen. Left atrium is normal in size. No significant valvular regurgitation or stenosis seen. Signature ----------------------------------------------------------------------------  Electronically signed by Carina Newsome(Sonographer) on 09/09/2022 02:20  PM ---------------------------------------------------------------------------- ----------------------------------------------------------------------------  Electronically signed by Milton PonceInterpreting physician) on 09/10/2022  08:12 AM ---------------------------------------------------------------------------- FINDINGS Left Atrium Left atrium is normal in size. Left Ventricle Left ventricle is normal in size and wall thickness. Global left ventricular systolic function is normal. Estimated LV EF 60-65 %.  No obvious wall motion abnormality seen. Right Atrium Right atrium is normal in size. Right Ventricle Normal right ventricular size and function. Mitral Valve No obvious valvular abnormality seen. No evidence of mitral regurgitation. Aortic Valve No obvious valvular abnormality seen. No evidence of aortic insufficiency or stenosis. Tricuspid Valve Tricuspid valve was not well visualized. Insignificant tricuspid regurgitation, unable to estimate RVSP. Pulmonic Valve Pulmonic valve was not well visualized. No evidence of pulmonic insufficiency or stenosis. Pericardial Effusion No significant pericardial effusion is seen. Pleural Effusion No pleural effusion seen. Miscellaneous Normal aortic root dimension. IVC not well visualized. E/E' average = 8.5.  M-mode / 2D Measurements & Calculations:   LVIDd:3.79 cm(3.7 - 5.6 cm)      Diastolic CBUYMC:54.5 ml  VNYRQ:3.12 cm(2.2 - 4.0 cm)      Systolic AGQPYO:4.02 ml  JEAM:6.17 cm(0.6 - 1.1 cm)       Aortic Root:3.6 cm(2.0 - 3.7 cm)  LVPWd:0.7 cm(0.6 - 1.1 cm)       LA Dimension: 2.3 cm(1.9 - 4.0 cm)  Fractional Shortenin.55 %    LA volume/Index: 33.7 ml /18m^2  Calculated LVEF (%): 86.38 %     LVOT:1.7 cm   Mitral:                              Aortic   Peak E-Wave: 0.75 m/s                Peak Velocity: 1.51 m/s  Peak A-Wave: 1.15 m/s                Mean Velocity: 0.86 m/s  E/A Ratio: 0.65                      Peak Gradient: 9.12 mmHg  Peak Gradient: 2.26 mmHg             Mean Gradient: 4 mmHg  Deceleration Time: 130 msec                                        Area (continuity): 2.26 cm^2                                       AV VTI: 20.6 cm  Septal Wall E' velocity:0.07 m/s Lateral Wall E' velocity:0.12 m/s    XR ABDOMEN (KUB) (SINGLE AP VIEW)    Result Date: 2022  EXAMINATION: ONE SUPINE XRAY VIEW(S) OF THE ABDOMEN 2022 9:33 am COMPARISON: 2022, 2022 HISTORY: ORDERING SYSTEM PROVIDED HISTORY: NGT placement, dark liquid coming through NG TECHNOLOGIST PROVIDED HISTORY: NGT placement, dark liquid coming through NG Reason for Exam: NGT placement, dark liquid coming through NG FINDINGS: Enteric tube coiled in the body of the stomach. Nonobstructive bowel gas pattern. Mild stool burden. Multiple external devices project over the patient. No acute osseous abnormality identified. Enteric tube coiled at the level of the body of the stomach. Nonobstructive bowel gas pattern. CT HEAD WO CONTRAST    Result Date: 9/8/2022  EXAMINATION: CT OF THE HEAD WITHOUT CONTRAST  9/8/2022 11:38 am TECHNIQUE: CT of the head was performed without the administration of intravenous contrast. Automated exposure control, iterative reconstruction, and/or weight based adjustment of the mA/kV was utilized to reduce the radiation dose to as low as reasonably achievable. COMPARISON: MRI 08/04/2022, 08/03/2022 and CT 08/03/2022. HISTORY: ORDERING SYSTEM PROVIDED HISTORY: Mental Status Changes TECHNOLOGIST PROVIDED HISTORY: Mental Status Changes Decision Support Exception - unselect if not a suspected or confirmed emergency medical condition->Emergency Medical Condition (MA) Is the patient pregnant?->No Reason for Exam: AMS Additional signs and symptoms: AMS FINDINGS: BRAIN/VENTRICLES: There is no acute intracranial hemorrhage, mass effect or midline shift. No abnormal extra-axial fluid collection. Encephalomalacia in the left frontal lobe demonstrated corresponding to recently demonstrated ischemia. Cortical atrophy and chronic white matter changes in the brain and associated ventricular enlargement are again demonstrated. ORBITS: The visualized portion of the orbits demonstrate no acute abnormality. SINUSES: The visualized paranasal sinuses and mastoid air cells demonstrate no acute abnormality. SOFT TISSUES/SKULL:  No acute abnormality of the visualized skull or soft tissues. Chronic findings in the brain without acute CT abnormality identified.  Expected evolution of recently demonstrated ischemia in the left frontal lobe. XR CHEST PORTABLE    Result Date: 9/8/2022  EXAMINATION: ONE XRAY VIEW OF THE CHEST 9/8/2022 7:53 pm COMPARISON: 08/17/2022 HISTORY: ORDERING SYSTEM PROVIDED HISTORY: line placement TECHNOLOGIST PROVIDED HISTORY: line placement Reason for Exam: Line placement FINDINGS: Central venous catheter tip overlies the right atrium. No pleural effusion or pneumothorax. Heart size is at the upper limits of normal.     Right central venous catheter tip overlies the right atrium. No pneumothorax. US RETROPERITONEAL COMPLETE    Result Date: 9/8/2022  EXAMINATION: RETROPERITONEAL ULTRASOUND OF THE KIDNEYS AND URINARY BLADDER 9/8/2022 COMPARISON: None HISTORY: ORDERING SYSTEM PROVIDED HISTORY: Acute kidney injury TECHNOLOGIST PROVIDED HISTORY: Acute kidney injury 56-year-old female with acute kidney injury FINDINGS: Kidneys: Right kidney measures 10.4 x 4.3 x 5.0 cm. Right renal cortical thickness measures 1.7 cm. Left kidney measures 9.4 x 4.8 x 4.8 cm. Left renal cortical thickness measures 1.8 cm. No hydronephrosis or perinephric fluid. No echogenic foci with posterior acoustic shadowing to suggest renal calculi. Gross preservation of the bilateral corticomedullary differentiation. Bladder: Not imaged. Unremarkable renal ultrasound. No hydronephrosis. MICRO:   [unfilled]    EKG: No results found for this or any previous visit (from the past 4464 hour(s)). ECHO: No results found for this or any previous visit. IMAGING:    [unfilled]    ASSESSMENT AND PLAN   Ray Veloz is a 56 y.o. female who presented with altered mental status and was noted to have urine tox positive for cocaine. Patient was also noted to have hyperglycemia, DKA and severe hyponatremia with sodium as high as 170. She remains lethargic and somnolent. She was noted to have coffee-ground emesis in the NG tube. Currently, NG tube is draining bile.   Hemoglobin trended down from 14 to 8.8 g/dL part of which is likely dilutional.  No overt bleeding at this time. Hematemesis/coffee-ground emesis:  Recommend PPI twice daily. Monitor hemoglobin. Transfuse to keep hemoglobin above 7 g/dL. Currently patient is critically ill with significant electrolyte abnormalities and DKA and risk of endoscopic intervention outweighs the benefit. The patient will need upper endoscopy eventually before discharge once acute illness resolves. We will follow.       Arturo Perez MD  Advanced Endoscopist  Pager: [phone]

## 2022-09-10 NOTE — PROGRESS NOTES
RN spoke to Dr. Bessy Alaniz. Orders received to change IV fluids to 0.45% NS @ 150 mL/hr. Do not call unless sodium is less than 150.

## 2022-09-10 NOTE — PLAN OF CARE
Problem: Discharge Planning  Goal: Discharge to home or other facility with appropriate resources  9/10/2022 1742 by Miri Douglas RN  Outcome: Progressing  Flowsheets (Taken 9/10/2022 0800)  Discharge to home or other facility with appropriate resources: Refer to discharge planning if patient needs post-hospital services based on physician order or complex needs related to functional status, cognitive ability or social support system     Problem: Pain  Goal: Verbalizes/displays adequate comfort level or baseline comfort level  9/10/2022 1742 by Miri Douglas RN  Outcome: Progressing  Flowsheets  Taken 9/10/2022 1600  Verbalizes/displays adequate comfort level or baseline comfort level:   Encourage patient to monitor pain and request assistance   Assess pain using appropriate pain scale   Administer analgesics based on type and severity of pain and evaluate response  Taken 9/10/2022 1200  Verbalizes/displays adequate comfort level or baseline comfort level:   Encourage patient to monitor pain and request assistance   Assess pain using appropriate pain scale   Administer analgesics based on type and severity of pain and evaluate response

## 2022-09-10 NOTE — PROGRESS NOTES
Pulmonary Progress Note  [facility]      Patient - Александр Duarte,  Age - 56 y.o.    - 1965      Room Number -    MRN -  [id number]   Acct # - [de-identified]  Date of Admission -  2022 11:01 AM    Consulting Service/Physician   Consulting - Jeanmarie Patel MD  Primary Care Physician - Nuris Patterson MD     SUBJECTIVE   Patient appears to be in fair spirits. Much more interactive from before. Actually calmer this afternoon  OBJECTIVE   VITALS    height is 5' 5\" (1.651 m) and weight is 198 lb 8 oz (90 kg). Her axillary temperature is 99.3 °F (37.4 °C). Her blood pressure is 131/66 and her pulse is 101 (abnormal). Her respiration is 21 and oxygen saturation is 97%. Body mass index is 33.03 kg/m². Temperature Range: Temp: 99.3 °F (37.4 °C) Temp  Av °F (37.2 °C)  Min: 98.5 °F (36.9 °C)  Max: 99.7 °F (37.6 °C)  BP Range:  Systolic (66FVR), EKH:359 , Min:76 , IPH:696     Diastolic (61ZSX), AAN:39, Min:42, Max:66    Pulse Range: Pulse  Av.8  Min: 101  Max: 117  Respiration Range: Resp  Av.1  Min: 21  Max: 31  Current Pulse Ox[de-identified]  SpO2: 97 %  24HR Pulse Ox Range:  SpO2  Av.1 %  Min: 95 %  Max: 99 %  Oxygen Amount and Delivery: Wt Readings from Last 3 Encounters:   09/10/22 198 lb 8 oz (90 kg)   22 240 lb (108.9 kg)   22 236 lb (107 kg)       I/O (24 Hours)    Intake/Output Summary (Last 24 hours) at 9/10/2022 1456  Last data filed at 9/10/2022 1256  Gross per 24 hour   Intake 8140.03 ml   Output 3125 ml   Net 5015.03 ml       EXAM     General Appearance  Awake, alert, oriented, in no acute distress  HEENT - normocephalic, atraumatic. Neck - Supple,  trachea midline   Lungs -coarse breath sounds no crackles rales or wheezing  Heart Exam:PMI normal. No lifts, heaves, or thrills. RRR. No murmurs, clicks, gallops, or rubs  Abdomen Exam: Abdomen soft, non-tender. BS normal  Extremity Exam: No signs pitting edema. Estelle Barriga     MEDS pantoprazole (PROTONIX) 40 mg injection  40 mg IntraVENous BID    insulin lispro  0-4 Units SubCUTAneous TID WC    insulin lispro  0-4 Units SubCUTAneous Nightly    atorvastatin  40 mg Oral Nightly    budesonide-formoterol  2 puff Inhalation BID    insulin glargine  30 Units SubCUTAneous BID    [Held by provider] metFORMIN  1,000 mg Oral BID WC    mirtazapine  7.5 mg Oral Nightly    traZODone  150 mg Oral Nightly    venlafaxine  150 mg Oral Daily with breakfast    fluticasone  1 spray Each Nostril Daily      IV infusion builder 150 mL/hr at 09/10/22 1156    dextrose      dextrose      norepinephrine       glucose, dextrose bolus **OR** dextrose bolus, dextrose, glucagon (rDNA), dextrose, albuterol sulfate HFA, sodium phosphate IVPB **OR** sodium phosphate IVPB **OR** sodium phosphate IVPB, polyethylene glycol, haloperidol lactate    LABS   CBC   Recent Labs     09/10/22  0457 09/10/22  1127   WBC 9.3  --    HGB 8.6* 8.8*   HCT 27.3* 26.9*   MCV 96.8  --      --      BMP:   Lab Results   Component Value Date/Time     09/10/2022 11:27 AM    K 3.5 09/10/2022 04:57 AM     09/10/2022 04:57 AM    CO2 16 09/10/2022 04:57 AM    BUN 61 09/10/2022 04:57 AM    LABALBU 4.0 09/08/2022 11:24 AM    CREATININE 1.47 09/10/2022 04:57 AM    CALCIUM 7.5 09/10/2022 04:57 AM    GFRAA 45 09/10/2022 04:57 AM    LABGLOM 37 09/10/2022 04:57 AM     ABGs:  Lab Results   Component Value Date/Time    PHART 7.365 09/09/2022 11:05 AM    PO2ART 76.4 09/09/2022 11:05 AM    HOW5FIR 31.6 09/09/2022 11:05 AM      Lab Results   Component Value Date/Time    MODE NOT REPORTED 11/04/2021 05:23 PM     Ionized Calcium:  No results found for: IONCA  Magnesium:    Lab Results   Component Value Date/Time    MG 2.1 09/09/2022 02:15 PM     Phosphorus:    Lab Results   Component Value Date/Time    PHOS 2.0 09/10/2022 11:27 AM        LIVER PROFILE   Recent Labs     09/08/22  1124   AST 11   ALT 15   BILITOT 0.4   ALKPHOS 151*     INR No results for input(s): INR in the last 72 hours.   PTT   Lab Results   Component Value Date    APTT 23.2 08/04/2022         RADIOLOGY     (See actual reports for details)    ASSESSMENT/PLAN     Patient Active Problem List   Diagnosis    IDDM (insulin dependent diabetes mellitus)    Lupus ([facility])    Post traumatic stress disorder (PTSD)    Acute cystitis with hematuria    Hyperglycemia due to diabetes mellitus ([facility])    Suicidal ideation    Arthritis    Chronic back pain    Acid reflux    History of stroke    Polysubstance abuse ([facility])    Cocaine abuse ([address]    Chest pain    Acute non-recurrent maxillary sinusitis    Acute respiratory failure with hypercapnia ([facility])    Alcohol use disorder, severe, dependence ([address]    Asthma exacerbation attacks    Bilateral edema of lower extremity    Bipolar 1 disorder, depressed, severe ([facility])    BMI 34.0-34.9,adult    Cannabis use disorder, severe, dependence ([facility])    Cocaine use disorder, severe, dependence ([address]    Dizziness    Dyslipidemia    Family history of colon cancer    History of lupus    Homicidal ideation    Hypotension    Neuropathy    Absolute anemia    Recurrent depression ([facility])    Recurrent major depressive disorder, in partial remission ([facility])    Severe recurrent major depression with psychotic features ([address]    Severe recurrent major depression without psychotic features ([address]    Upper GI bleed    Vitamin D deficiency    Acute encephalopathy    Gastroesophageal reflux disease    Brain lesion    Rib lesion    Diabetes mellitus type 2 in obese ([facility])    YAEL (generalized anxiety disorder)    Posttraumatic stress disorder    Suicidal intent    Leg weakness, bilateral    Poorly controlled diabetes mellitus ([facility])    Felon of finger    Osteomyelitis ([facility])    Recurrent falls    Seasonal allergic rhinitis    Fibromyalgia    Tenosynovitis of finger    Open wound of right index finger    Adverse effect of COVID-19 vaccine    Wound dehiscence    Amputation finger    Abscess of right index finger    Type 2 diabetes mellitus without complication, without long-term current use of insulin ([facility])    Major depression    Right foot infection    Cellulitis and abscess of toe of right foot    Abscess of right foot    Bilateral cellulitis of lower leg related to related to  uncontrolled diabetes    Bipolar disorder, current episode mixed, unspecified ([facility])    Right foot ulcer, with fat layer exposed ([facility]    Ulcer of left foot, with fat layer exposed ([address]    CRP elevated    Status post incision and drainage    Intractable headache    Type 2 diabetes mellitus with diabetic autonomic neuropathy, with long-term current use of insulin ([facility])    Somnolence    Cerebral infarction due to embolism of left middle cerebral artery ([facility])    DKA, type 1, not at goal [facility])    DKA, type 2, not at goal [facility])    Uremic encephalopathy    Unspecified mood (affective) disorder ([facility]     IMPRESSION:  1. Encephalopathy. Improving  2. Anion gap acidosis was not elevated 1 hour normal 1  3. Elevated beta hydroxybutyrate with glucose of 1034, concern for DKA. 4.  Acute kidney injury. BUN 29 and 3.56 upon admission down to 61/1.47  5. Hyperkalemia,-- resolved  6. Hyper natremia improving down to 158   7. History of cocaine and marijuana use. --he patient's acetaminophen level, salicylate  level and ethanol level are all normal.  8. Tox screen positive for fentanyl and cocaine  9   Bipolar affective disorder. 10.. Poor p.o. intake. 11.  Full code    On EPC cuffs Pharmacological prophylaxis not given discontinued by other services. Concern for GI bleed. The patient is undergoing a consultation by gastroenterology.     Not on Levophed  Transfer note intermediate care okay    Electronically signed by Lucia Almonte MD on 9/10/2022 at 2:56 PM

## 2022-09-10 NOTE — PROGRESS NOTES
[facility]   Progress Note                   Date:   9/10/2022  Patient name: Alen Layton  Date of admission:  9/8/2022 11:01 AM  MRN:   [id number]  YOB: 1965  PCP: Danial Montoya MD    Reason for Admission:      Subjective:       Clinical Changes / Abnormalities: Patient denies any pain pressure tightness  No palpitations  Sitter in the room  No more dysrhythmias      Medications:   Scheduled Meds:   pantoprazole (PROTONIX) 40 mg injection  40 mg IntraVENous BID    insulin lispro  0-4 Units SubCUTAneous TID WC    insulin lispro  0-4 Units SubCUTAneous Nightly    atorvastatin  40 mg Oral Nightly    budesonide-formoterol  2 puff Inhalation BID    [Held by provider] insulin glargine  30 Units SubCUTAneous BID    [Held by provider] metFORMIN  1,000 mg Oral BID WC    mirtazapine  7.5 mg Oral Nightly    traZODone  150 mg Oral Nightly    venlafaxine  150 mg Oral Daily with breakfast    fluticasone  1 spray Each Nostril Daily     Continuous Infusions:   sodium chloride 150 mL/hr at 09/10/22 0749    dextrose      dextrose      norepinephrine       CBC:   Recent Labs     09/08/22  1124 09/09/22  0441 09/09/22  1750 09/10/22  0457   WBC 10.9 3.5  --  9.3   HGB 14.4 10.3* 9.6* 8.6*    261  --  153     BMP:    Recent Labs     09/09/22  1038 09/09/22  1415 09/09/22  1750 09/10/22  0040 09/10/22  0457   * 169* 167* 160* 157*  157*   K 4.2 3.5*  --   --  3.5*   * >140*  --   --  132*   CO2 20 18*  --   --  16*   BUN 96* 86*  --   --  61*   CREATININE 2.18* 1.92*  --   --  1.47*   GLUCOSE 229* 165*  --   --  409*     Hepatic:   Recent Labs     09/08/22  1124   AST 11   ALT 15   BILITOT 0.4   ALKPHOS 151*     Troponin: No results for input(s): TROPONINI in the last 72 hours. BNP: No results for input(s): BNP in the last 72 hours. Lipids: No results for input(s): CHOL, HDL in the last 72 hours.     Invalid input(s): LDLCALCU  INR: No results for input(s): INR in the last 72 hours. Objective:   Vitals: /61   Pulse (!) 111   Temp 99 °F (37.2 °C) (Oral)   Resp 24   Ht 5' 5\" (1.651 m)   Wt 180 lb 12.4 oz (82 kg)   LMP  (LMP Unknown)   SpO2 96%   BMI 30.08 kg/m²   I/O last 3 completed shifts: In: 9690 [I.V.:7357.5; NG/GT:1200; IV Piggyback:1132.5]  Out: 2550 [Urine:2200; Emesis/NG output:350]  No intake/output data recorded. Scheduled Meds:   pantoprazole (PROTONIX) 40 mg injection  40 mg IntraVENous BID    insulin lispro  0-4 Units SubCUTAneous TID WC    insulin lispro  0-4 Units SubCUTAneous Nightly    atorvastatin  40 mg Oral Nightly    budesonide-formoterol  2 puff Inhalation BID    [Held by provider] insulin glargine  30 Units SubCUTAneous BID    [Held by provider] metFORMIN  1,000 mg Oral BID WC    mirtazapine  7.5 mg Oral Nightly    traZODone  150 mg Oral Nightly    venlafaxine  150 mg Oral Daily with breakfast    fluticasone  1 spray Each Nostril Daily     Continuous Infusions:   sodium chloride 150 mL/hr at 09/10/22 0749    dextrose      dextrose      norepinephrine       PRN Meds:.glucose, dextrose bolus **OR** dextrose bolus, dextrose, glucagon (rDNA), dextrose, albuterol sulfate HFA, sodium phosphate IVPB **OR** sodium phosphate IVPB **OR** sodium phosphate IVPB, polyethylene glycol, haloperidol lactate    CONSTITUTIONAL: AOx4, no apparent distress, appears stated age   HEAD: normocephalic, atraumatic   EYES: PERRLA, EOMI   ENT: moist mucous membranes, uvula midline   NECK: symmetric, no midline tenderness to palpation   LUNGS: clear to auscultation bilaterally   CARDIOVASCULAR: regular rate and rhythm, no murmurs, rubs or gallops   ABDOMEN: Soft, non-tender, non-distended with normal active bowel sounds   SKIN: no rash       EKG:   ECHO: 9/9/22  Summary  Left ventricle is normal in size and wall thickness. Global left ventricular systolic function is normal. Estimated LV EF 60-65  %. No obvious wall motion abnormality seen.   Left atrium is normal in size.  No significant valvular regurgitation or stenosis seen.                    Assessment / Acute Cardiac Problems:       Patient Active Problem List:     IDDM (insulin dependent diabetes mellitus)     Lupus (HCC)     Post traumatic stress disorder (PTSD)     Acute cystitis with hematuria     Hyperglycemia due to diabetes mellitus ([facility])     Suicidal ideation     Arthritis     Chronic back pain     Acid reflux     History of stroke     Polysubstance abuse (HCC)     Cocaine abuse (HCC)     Chest pain     Acute non-recurrent maxillary sinusitis     Acute respiratory failure with hypercapnia ([facility])     Alcohol use disorder, severe, dependence ([facility])     Asthma exacerbation attacks     Bilateral edema of lower extremity     Bipolar 1 disorder, depressed, severe ([facility])     BMI 34.0-34.9,adult     Cannabis use disorder, severe, dependence (HCC)     Cocaine use disorder, severe, dependence ([facility])     Dizziness     Dyslipidemia     Family history of colon cancer     History of lupus     Homicidal ideation     Hypotension     Neuropathy     Absolute anemia     Recurrent depression (HCC)     Recurrent major depressive disorder, in partial remission (HCC)     Severe recurrent major depression with psychotic features (HCC)     Severe recurrent major depression without psychotic features ([address]     Upper GI bleed     Vitamin D deficiency     Acute encephalopathy     Gastroesophageal reflux disease     Brain lesion     Rib lesion     Diabetes mellitus type 2 in obese (HCC)     YAEL (generalized anxiety disorder)     Posttraumatic stress disorder     Suicidal intent     Leg weakness, bilateral     Poorly controlled diabetes mellitus (HCC)     Felon of finger     Osteomyelitis ([facility])     Recurrent falls     Seasonal allergic rhinitis     Fibromyalgia     Tenosynovitis of finger     Open wound of right index finger     Adverse effect of COVID-19 vaccine     Wound dehiscence     Amputation finger     Abscess of right index finger     Type 2 diabetes mellitus without complication, without long-term current use of insulin (HCC)     Major depression     Right foot infection     Cellulitis and abscess of toe of right foot     Abscess of right foot     Bilateral cellulitis of lower leg related to related to  uncontrolled diabetes     Bipolar disorder, current episode mixed, unspecified (HCC)     Right foot ulcer, with fat layer exposed ([address]     Ulcer of left foot, with fat layer exposed ([address]     CRP elevated     Status post incision and drainage     Intractable headache     Type 2 diabetes mellitus with diabetic autonomic neuropathy, with long-term current use of insulin (HCC)     Somnolence     Cerebral infarction due to embolism of left middle cerebral artery (HCC)     DKA, type 1, not at goal [facility])     DKA, type 2, not at goal [facility])     Uremic encephalopathy      Plan of Treatment:   No more dysrhythmias  Echocardiogram within normal limits  Keep the electrolytes within normal limits  Will sign out please call if needed    Darryl Burger MD, [facility] Cardiology  [phone]

## 2022-09-10 NOTE — PLAN OF CARE
Problem: Skin/Tissue Integrity  Goal: Absence of new skin breakdown  Description: 1. Monitor for areas of redness and/or skin breakdown  2. Assess vascular access sites hourly  3. Every 4-6 hours minimum:  Change oxygen saturation probe site  4. Every 4-6 hours:  If on nasal continuous positive airway pressure, respiratory therapy assess nares and determine need for appliance change or resting period. 9/10/2022 0517 by Ayan Patterson RN  Outcome: Progressing  Note: Skin assessment as charted. Will continue to monitor. 9/9/2022 1737 by Kim Gilbert RN  Outcome: Progressing     Problem: ABCDS Injury Assessment  Goal: Absence of physical injury  9/10/2022 0517 by Ayan Patterson RN  Outcome: Progressing  Flowsheets (Taken 9/9/2022 2127)  Absence of Physical Injury: Implement safety measures based on patient assessment  Note: No injury this shift. Patient safety maintained. Sitter remains at bedside   9/9/2022 1737 by Kim Gilbert RN  Outcome: Progressing     Problem: Safety - Adult  Goal: Free from fall injury  9/10/2022 0517 by Ayan Patterson RN  Outcome: Progressing  Flowsheets (Taken 9/9/2022 2127)  Free From Fall Injury: Instruct family/caregiver on patient safety  Note: No falls during this shift. Call light is within reach. Side rails up x2 with bed in lowest position. Patient safety maintained.    9/9/2022 1737 by Kim Gilbert RN  Outcome: Progressing     Problem: Skin/Tissue Integrity - Adult  Goal: Skin integrity remains intact  9/10/2022 0517 by Ayan Patterson RN  Outcome: Progressing  Flowsheets (Taken 9/9/2022 2127)  Skin Integrity Remains Intact:   Monitor for areas of redness and/or skin breakdown   Assess vascular access sites hourly  9/9/2022 1737 by Kim Gilbert RN  Outcome: Progressing

## 2022-09-11 PROBLEM — K92.0 COFFEE GROUND EMESIS: Status: ACTIVE | Noted: 2022-09-11

## 2022-09-11 PROBLEM — R79.89 ELEVATED LFTS: Status: ACTIVE | Noted: 2022-09-11

## 2022-09-11 LAB
ABSOLUTE EOS #: 0.4 K/UL (ref 0–0.4)
ABSOLUTE LYMPH #: 2.3 K/UL (ref 1–4.8)
ABSOLUTE MONO #: 1.1 K/UL (ref 0.1–1.3)
ANION GAP SERPL CALCULATED.3IONS-SCNC: 10 MMOL/L (ref 9–17)
ANION GAP SERPL CALCULATED.3IONS-SCNC: 8 MMOL/L (ref 9–17)
ANION GAP SERPL CALCULATED.3IONS-SCNC: 9 MMOL/L (ref 9–17)
ANION GAP SERPL CALCULATED.3IONS-SCNC: 9 MMOL/L (ref 9–17)
BASOPHILS # BLD: 0 % (ref 0–2)
BASOPHILS ABSOLUTE: 0 K/UL (ref 0–0.2)
BUN BLDV-MCNC: 28 MG/DL (ref 6–20)
CALCIUM SERPL-MCNC: 7.3 MG/DL (ref 8.6–10.4)
CHLORIDE BLD-SCNC: 116 MMOL/L (ref 98–107)
CHLORIDE BLD-SCNC: 120 MMOL/L (ref 98–107)
CHLORIDE BLD-SCNC: 121 MMOL/L (ref 98–107)
CHLORIDE BLD-SCNC: 122 MMOL/L (ref 98–107)
CO2: 21 MMOL/L (ref 20–31)
CO2: 22 MMOL/L (ref 20–31)
CREAT SERPL-MCNC: 1 MG/DL (ref 0.5–0.9)
EOSINOPHILS RELATIVE PERCENT: 3 % (ref 0–4)
GFR AFRICAN AMERICAN: >60 ML/MIN
GFR NON-AFRICAN AMERICAN: 58 ML/MIN
GFR SERPL CREATININE-BSD FRML MDRD: ABNORMAL ML/MIN/{1.73_M2}
GLUCOSE BLD-MCNC: 111 MG/DL (ref 65–105)
GLUCOSE BLD-MCNC: 172 MG/DL (ref 65–105)
GLUCOSE BLD-MCNC: 80 MG/DL (ref 65–105)
GLUCOSE BLD-MCNC: 89 MG/DL (ref 70–99)
HCT VFR BLD CALC: 24.9 % (ref 36–46)
HCT VFR BLD CALC: 24.9 % (ref 36–46)
HCT VFR BLD CALC: 25.9 % (ref 36–46)
HEMOGLOBIN: 8 G/DL (ref 12–16)
HEMOGLOBIN: 8.1 G/DL (ref 12–16)
HEMOGLOBIN: 8.4 G/DL (ref 12–16)
LYMPHOCYTES # BLD: 20 % (ref 24–44)
MAGNESIUM: 1.4 MG/DL (ref 1.6–2.6)
MAGNESIUM: 1.4 MG/DL (ref 1.6–2.6)
MAGNESIUM: 1.7 MG/DL (ref 1.6–2.6)
MAGNESIUM: 1.7 MG/DL (ref 1.6–2.6)
MCH RBC QN AUTO: 30.8 PG (ref 26–34)
MCHC RBC AUTO-ENTMCNC: 32.5 G/DL (ref 31–37)
MCV RBC AUTO: 95 FL (ref 80–100)
MONOCYTES # BLD: 10 % (ref 1–7)
PDW BLD-RTO: 13.3 % (ref 11.5–14.9)
PHOSPHORUS: 1.9 MG/DL (ref 2.6–4.5)
PHOSPHORUS: 2.1 MG/DL (ref 2.6–4.5)
PHOSPHORUS: 2.8 MG/DL (ref 2.6–4.5)
PLATELET # BLD: 116 K/UL (ref 150–450)
PMV BLD AUTO: 9.2 FL (ref 6–12)
POTASSIUM SERPL-SCNC: 3.1 MMOL/L (ref 3.7–5.3)
POTASSIUM SERPL-SCNC: 3.1 MMOL/L (ref 3.7–5.3)
POTASSIUM SERPL-SCNC: 3.4 MMOL/L (ref 3.7–5.3)
POTASSIUM SERPL-SCNC: 3.7 MMOL/L (ref 3.7–5.3)
RBC # BLD: 2.63 M/UL (ref 4–5.2)
SEG NEUTROPHILS: 67 % (ref 36–66)
SEGMENTED NEUTROPHILS ABSOLUTE COUNT: 7.7 K/UL (ref 1.3–9.1)
SODIUM BLD-SCNC: 147 MMOL/L (ref 135–144)
SODIUM BLD-SCNC: 150 MMOL/L (ref 135–144)
SODIUM BLD-SCNC: 151 MMOL/L (ref 135–144)
SODIUM BLD-SCNC: 152 MMOL/L (ref 135–144)
WBC # BLD: 11.6 K/UL (ref 3.5–11)

## 2022-09-11 PROCEDURE — 83735 ASSAY OF MAGNESIUM: CPT

## 2022-09-11 PROCEDURE — A4216 STERILE WATER/SALINE, 10 ML: HCPCS | Performed by: STUDENT IN AN ORGANIZED HEALTH CARE EDUCATION/TRAINING PROGRAM

## 2022-09-11 PROCEDURE — APPSS30 APP SPLIT SHARED TIME 16-30 MINUTES: Performed by: NURSE PRACTITIONER

## 2022-09-11 PROCEDURE — 85018 HEMOGLOBIN: CPT

## 2022-09-11 PROCEDURE — C9113 INJ PANTOPRAZOLE SODIUM, VIA: HCPCS | Performed by: STUDENT IN AN ORGANIZED HEALTH CARE EDUCATION/TRAINING PROGRAM

## 2022-09-11 PROCEDURE — 2580000003 HC RX 258: Performed by: STUDENT IN AN ORGANIZED HEALTH CARE EDUCATION/TRAINING PROGRAM

## 2022-09-11 PROCEDURE — 6360000002 HC RX W HCPCS: Performed by: INTERNAL MEDICINE

## 2022-09-11 PROCEDURE — 6360000002 HC RX W HCPCS: Performed by: STUDENT IN AN ORGANIZED HEALTH CARE EDUCATION/TRAINING PROGRAM

## 2022-09-11 PROCEDURE — 51702 INSERT TEMP BLADDER CATH: CPT

## 2022-09-11 PROCEDURE — 6370000000 HC RX 637 (ALT 250 FOR IP): Performed by: STUDENT IN AN ORGANIZED HEALTH CARE EDUCATION/TRAINING PROGRAM

## 2022-09-11 PROCEDURE — 99233 SBSQ HOSP IP/OBS HIGH 50: CPT | Performed by: INTERNAL MEDICINE

## 2022-09-11 PROCEDURE — 2580000003 HC RX 258: Performed by: INTERNAL MEDICINE

## 2022-09-11 PROCEDURE — 85025 COMPLETE CBC W/AUTO DIFF WBC: CPT

## 2022-09-11 PROCEDURE — 36415 COLL VENOUS BLD VENIPUNCTURE: CPT

## 2022-09-11 PROCEDURE — 82947 ASSAY GLUCOSE BLOOD QUANT: CPT

## 2022-09-11 PROCEDURE — 6370000000 HC RX 637 (ALT 250 FOR IP)

## 2022-09-11 PROCEDURE — 2500000003 HC RX 250 WO HCPCS: Performed by: STUDENT IN AN ORGANIZED HEALTH CARE EDUCATION/TRAINING PROGRAM

## 2022-09-11 PROCEDURE — 2060000000 HC ICU INTERMEDIATE R&B

## 2022-09-11 PROCEDURE — 85014 HEMATOCRIT: CPT

## 2022-09-11 PROCEDURE — 94761 N-INVAS EAR/PLS OXIMETRY MLT: CPT

## 2022-09-11 PROCEDURE — 80048 BASIC METABOLIC PNL TOTAL CA: CPT

## 2022-09-11 PROCEDURE — 84100 ASSAY OF PHOSPHORUS: CPT

## 2022-09-11 PROCEDURE — 80051 ELECTROLYTE PANEL: CPT

## 2022-09-11 PROCEDURE — 94640 AIRWAY INHALATION TREATMENT: CPT

## 2022-09-11 RX ORDER — ACETAMINOPHEN 325 MG/1
650 TABLET ORAL EVERY 4 HOURS PRN
Status: DISCONTINUED | OUTPATIENT
Start: 2022-09-11 | End: 2022-09-22 | Stop reason: HOSPADM

## 2022-09-11 RX ORDER — MAGNESIUM SULFATE HEPTAHYDRATE 40 MG/ML
2000 INJECTION, SOLUTION INTRAVENOUS ONCE
Status: COMPLETED | OUTPATIENT
Start: 2022-09-11 | End: 2022-09-11

## 2022-09-11 RX ORDER — SODIUM CHLORIDE 450 MG/100ML
INJECTION, SOLUTION INTRAVENOUS CONTINUOUS
Status: DISCONTINUED | OUTPATIENT
Start: 2022-09-11 | End: 2022-09-12

## 2022-09-11 RX ORDER — POTASSIUM CHLORIDE 7.45 MG/ML
10 INJECTION INTRAVENOUS PRN
Status: DISCONTINUED | OUTPATIENT
Start: 2022-09-11 | End: 2022-09-22 | Stop reason: HOSPADM

## 2022-09-11 RX ORDER — MAGNESIUM SULFATE 1 G/100ML
1000 INJECTION INTRAVENOUS PRN
Status: DISCONTINUED | OUTPATIENT
Start: 2022-09-11 | End: 2022-09-22 | Stop reason: HOSPADM

## 2022-09-11 RX ADMIN — Medication 2 PUFF: at 19:42

## 2022-09-11 RX ADMIN — ATORVASTATIN CALCIUM 40 MG: 40 TABLET, FILM COATED ORAL at 21:08

## 2022-09-11 RX ADMIN — SODIUM CHLORIDE 40 MG: 9 INJECTION INTRAMUSCULAR; INTRAVENOUS; SUBCUTANEOUS at 08:43

## 2022-09-11 RX ADMIN — POTASSIUM CHLORIDE 10 MEQ: 7.46 INJECTION, SOLUTION INTRAVENOUS at 10:17

## 2022-09-11 RX ADMIN — POTASSIUM CHLORIDE 10 MEQ: 7.46 INJECTION, SOLUTION INTRAVENOUS at 08:58

## 2022-09-11 RX ADMIN — TRAZODONE HYDROCHLORIDE 150 MG: 100 TABLET ORAL at 21:08

## 2022-09-11 RX ADMIN — VENLAFAXINE HYDROCHLORIDE 150 MG: 150 CAPSULE, EXTENDED RELEASE ORAL at 08:41

## 2022-09-11 RX ADMIN — Medication 2 PUFF: at 07:46

## 2022-09-11 RX ADMIN — MAGNESIUM SULFATE HEPTAHYDRATE 2000 MG: 40 INJECTION, SOLUTION INTRAVENOUS at 08:57

## 2022-09-11 RX ADMIN — INSULIN GLARGINE 30 UNITS: 100 INJECTION, SOLUTION SUBCUTANEOUS at 21:12

## 2022-09-11 RX ADMIN — SODIUM PHOSPHATE, MONOBASIC, MONOHYDRATE AND SODIUM PHOSPHATE, DIBASIC, ANHYDROUS 15 MMOL: 276; 142 INJECTION, SOLUTION INTRAVENOUS at 06:08

## 2022-09-11 RX ADMIN — MIRTAZAPINE 7.5 MG: 15 TABLET, FILM COATED ORAL at 21:08

## 2022-09-11 RX ADMIN — SODIUM CHLORIDE: 4.5 INJECTION, SOLUTION INTRAVENOUS at 07:36

## 2022-09-11 RX ADMIN — FLUTICASONE PROPIONATE 1 SPRAY: 50 SPRAY, METERED NASAL at 09:01

## 2022-09-11 RX ADMIN — SODIUM CHLORIDE 40 MG: 9 INJECTION INTRAMUSCULAR; INTRAVENOUS; SUBCUTANEOUS at 21:08

## 2022-09-11 RX ADMIN — ACETAMINOPHEN 650 MG: 325 TABLET, FILM COATED ORAL at 16:20

## 2022-09-11 RX ADMIN — POTASSIUM CHLORIDE 10 MEQ: 7.46 INJECTION, SOLUTION INTRAVENOUS at 14:13

## 2022-09-11 RX ADMIN — POTASSIUM CHLORIDE 10 MEQ: 7.46 INJECTION, SOLUTION INTRAVENOUS at 12:34

## 2022-09-11 ASSESSMENT — PAIN SCALES - GENERAL
PAINLEVEL_OUTOF10: 0
PAINLEVEL_OUTOF10: 0
PAINLEVEL_OUTOF10: 5

## 2022-09-11 NOTE — PROGRESS NOTES
RN notified Dr. Kelly Yao of Na 149. Orders received to discontinue Q6H Na. Orders received for Q6H Electrolyte panel, Magnesium, and Phosphorus beginning 9/11 0600. Orders received to Discontinue current IV fluids, and begin NaCl 0.9% 125mL/hr. Orders received to call Dr. Kelly Yao is Na is below 140.

## 2022-09-11 NOTE — PROGRESS NOTES
Patient very agitated and trying to leave the room to go to [facility]. RN was able to get her back into bed and place the patient back in restraints. Medical residents called and restraints were re-ordered. Tylenol was also ordered d/t patient c/o pain.

## 2022-09-11 NOTE — PROGRESS NOTES
RN notified Dr. Andrez Campbell of K 3.1 and Mag 1.4. Orders received to replace according to sliding scale.

## 2022-09-11 NOTE — PROGRESS NOTES
Department of Internal Medicine  Nephrology Tico Mi MD  Progress Note    Reason for consultation: Management of acute kidney injury. Consulting physician: Augustine Brittle, MD.    Interval history: Patient was seen and examined today in the intensive care unit and mental status has improved. She is nonoliguric. Laboratory studies reveal positive TATIANA, SANDY but negative antidouble-stranded DNA. She is nonoliguric. Liver/gallbladder ultrasound performed 9/10/2022 showed:  Unremarkable ultrasound the liver. Cholelithiasis with mild gallbladder wall thickening. Negative sonographic   Gilliland's sign. No pericholecystic fluid. Common duct upper normal at 6.8 mm. No intraductal stone demonstrated. Correlation clinical findings and laboratory values required. History of present illness: This is a 56 y.o. female with a significant past medical history of Severe bipolar disorder, type 2 diabetes mellitus [insulin requiring], cannabis and cocaine abuse, systemic hypertensionFibromyalgia and s/p prior cerebrovascular accident, who was found unresponsive at home where she lives alone. She had apparently not been taking her insulin as her refrigerator was filled with unused insulin vials. Place was activated after family had not heard from patient for several days and when EMS arrived, patient was pretty confused and obtunded with severe hypotension and BP could not be obtained initially. Blood glucose was greater than 1000 mg/dL. Blood pressure in the emergency department was 86/64 mmHg with pulse 100 and respiratory rate 24/min. Laboratory studies were remarkable for hemoconcentration, serum potassium 5.5 mmol/L, blood glucose 1036 mg/dL, serum bicarbonate 19 mmol/L [anion gap 25 mmol/L] and elevated BUN/creatinine 129/3.56 mg/dL and hence nephrology consultation. At the time of this encounter, patient remained confused and appears quite dry and delirious to occasionally lucid.     Scheduled Meds:   magnesium sulfate  2,000 mg IntraVENous Once    pantoprazole (PROTONIX) 40 mg injection  40 mg IntraVENous BID    insulin lispro  0-4 Units SubCUTAneous TID WC    insulin lispro  0-4 Units SubCUTAneous Nightly    atorvastatin  40 mg Oral Nightly    budesonide-formoterol  2 puff Inhalation BID    insulin glargine  30 Units SubCUTAneous BID    [Held by provider] metFORMIN  1,000 mg Oral BID WC    mirtazapine  7.5 mg Oral Nightly    traZODone  150 mg Oral Nightly    venlafaxine  150 mg Oral Daily with breakfast    fluticasone  1 spray Each Nostril Daily     Continuous Infusions:   sodium chloride 100 mL/hr at 22 0736    dextrose      dextrose       PRN Meds:.potassium chloride, magnesium sulfate, glucose, dextrose bolus **OR** dextrose bolus, dextrose, glucagon (rDNA), dextrose, albuterol sulfate HFA, sodium phosphate IVPB **OR** sodium phosphate IVPB **OR** sodium phosphate IVPB, polyethylene glycol, haloperidol lactate    Physical Exam:    VITALS:  /61   Pulse 86   Temp 98.4 °F (36.9 °C) (Oral)   Resp 20   Ht 5' 5\" (1.651 m)   Wt 198 lb 8 oz (90 kg)   LMP  (LMP Unknown)   SpO2 100%   BMI 33.03 kg/m²   TEMPERATURE:  Current - Temp: 98.4 °F (36.9 °C); Max - Temp  Av °F (37.2 °C)  Min: 98.4 °F (36.9 °C)  Max: 99.4 °F (37.4 °C)  RESPIRATIONS RANGE: Resp  Av  Min: 15  Max: 26  PULSE RANGE: Pulse  Av.9  Min: 84  Max: 101  BLOOD PRESSURE RANGE:  Systolic (58PPU), YWB:268 , Min:110 , JLP:342 ; Diastolic (34COX), IHM:30, Min:51, Max:70  PULSE OXIMETRY RANGE: SpO2  Av.5 %  Min: 90 %  Max: 100 %  24HR INTAKE/OUTPUT:    Intake/Output Summary (Last 24 hours) at 2022 0940  Last data filed at 2022 0400  Gross per 24 hour   Intake 4855.92 ml   Output 3375 ml   Net 1480.92 ml       Constitutional: Awake and responsive; not in respiratory distress;  AxO x2    Skin: Skin color, texture, turgor normal. No rashes or lesions    Head: Normocephalic, without obvious abnormality, atraumatic     Cardiovascular/Edema: regular rate and rhythm, S1, S2 normal, no murmur, click, rub or gallop    Respiratory: Lungs: clear to auscultation bilaterally    Abdomen: soft, non-tender; bowel sounds normal; no masses,  no organomegaly    Back: symmetric, no curvature. ROM normal. No CVA tenderness. Extremities: extremities normal, atraumatic, no cyanosis or edema; Amputation of the left fourth and fifth toe as well as fourth metatarsal.    Neuro:  Grossly normal    CBC:   Recent Labs     09/09/22  0441 09/09/22  1750 09/10/22  0457 09/10/22  1127 09/10/22  1913 09/11/22  0446   WBC 3.5  --  9.3  --   --  11.6*   HGB 10.3*   < > 8.6* 8.8* 8.3* 8.1*     --  153  --   --  116*    < > = values in this interval not displayed. BMP:    Recent Labs     09/09/22  1415 09/09/22  1750 09/10/22  0457 09/10/22  1127 09/10/22  1913 09/11/22  0446 09/11/22  0447   *   < > 157*  157*   < > 149* 152* 151*   K 3.5*  --  3.5*  --   --  3.1* 3.1*   CL >140*  --  132*  --   --  122* 121*   CO2 18*  --  16*  --   --  22 21   BUN 86*  --  61*  --   --   --  28*   CREATININE 1.92*  --  1.47*  --   --   --  1.00*   GLUCOSE 165*  --  409*  --   --   --  89    < > = values in this interval not displayed.        Lab Results   Component Value Date/Time    NITRU NEGATIVE 09/08/2022 04:45 PM    COLORU Yellow 09/08/2022 04:45 PM    PHUR 5.5 09/08/2022 04:45 PM    WBCUA 6 TO 9 09/08/2022 04:45 PM    RBCUA 0 TO 2 09/08/2022 04:45 PM    MUCUS NOT REPORTED 01/24/2022 12:04 PM    TRICHOMONAS NOT REPORTED 01/24/2022 12:04 PM    YEAST FEW 08/04/2022 02:30 PM    BACTERIA FEW 08/04/2022 02:30 PM    CLARITYU clear 07/12/2021 09:57 AM    SPECGRAV 1.015 09/08/2022 04:45 PM    LEUKOCYTESUR NEGATIVE 09/08/2022 04:45 PM    UROBILINOGEN Normal 09/08/2022 04:45 PM    BILIRUBINUR NEGATIVE  Verified by ictotest. 09/08/2022 04:45 PM    BILIRUBINUR negative 07/12/2021 09:57 AM    BLOODU negative 07/12/2021 09:57 AM    GLUCOSEU 2+ 09/08/2022 04:45 PM    PIPE SMALL 09/08/2022 04:45 PM    AMORPHOUS NOT REPORTED 01/24/2022 12:04 PM     Urine Sodium:     Lab Results   Component Value Date/Time    EMILIO <20 09/08/2022 05:28 PM     Urine Protein:     Lab Results   Component Value Date/Time    TPU 13 07/08/2020 11:19 AM     Urine Creatinine:     Lab Results   Component Value Date/Time    LABCREA 121.0 09/08/2022 05:28 PM     IMPRESSION/RECOMMENDATIONS:      1. Acute kidney injury - most consistent with prerenal azotemia secondary to dehydration from osmotic diuresis as well as ischemic acute tubular necrosis secondary to hypotension. Random urine electrolytes are however more consistent with prerenal azotemia or acute glomerulonephritis. Urine output and renal function continue to improve. Plan: Change IVF 0.45 normal saline to 100 ml/hr. Strict urine output documentation. Basic metabolic profile daily. 2.  Diabetic ketoacidosis - Resolved. Okay to restart metformin once renal function stabilizes. 3.  Systemic hypertension - blood pressure is adequately controlled    4. Hypernatremia - We will continue hypotonic IV fluid but discontinue NG tube as well as free water per NG tube as patient is now tolerating oral intake. 5.  Hypophosphatemia - secondary to insulin. Give IV potassium phosphate. 6.  Hypokalemia - likely secondary to intracellular shift with insulin. Would replace with potassium sliding scale. 7.  Hypomagnesemia [1.4 mg/dL] - IV magnesium sulfate 2 g.    8.  Thrombocytopenia -Heparin antiplatelet antibodies. Doubt thrombotic microangiopathy. Prognosis is guarded.     Torrey Toth MD FACP  Attending Nephrologist  9/11/2022 9:40 AM

## 2022-09-11 NOTE — CARE COORDINATION
ONGOING DISCHARGE PLAN:    Transfer from ICU. Patient is alert, but confused. Telesitter at the bedside. Pt. Would not answer questions appropriately. Pt. Would not provide any family member information or contact information. Cardio/Nephro continue to follow. Per GI, notes, ? EGD Tues/Wed. Will get good DC plan & follow for needs, when more medically stable. Will continue to follow for additional discharge needs.     Electronically signed by Lena Mcgill RN on 9/11/2022 at 2:08 PM

## 2022-09-11 NOTE — PROGRESS NOTES
[facility]    Gastroenterology Daily Progress Note      Patient:   Christ Wen   :    1965   Facility:   [facility]  Date:     2022  Consultant:   LAILA Jordan CNP, CNP      SUBJECTIVE  56 y.o. female admitted 2022 with DKA, type 1, not at goal [facility]) [E10.10]  DKA, type 2, not at goal [facility]) [E11.10]  Acute renal failure, unspecified acute renal failure type ([facility] [place] [address] [N17.9]  Diabetic ketoacidosis with coma associated with type 1 diabetes mellitus ([facility] [place] [address] [E10.11] and seen for coffee ground material in the ng tube. The pt was seen and examined. Ng tube has been removed. The pt is oriented to person only, denies nausea and abdominal pain. Rn states she had one non bloody BM overnight. Hgb 8.1.         OBJECTIVE  Scheduled Meds:   pantoprazole (PROTONIX) 40 mg injection  40 mg IntraVENous BID    insulin lispro  0-4 Units SubCUTAneous TID WC    insulin lispro  0-4 Units SubCUTAneous Nightly    atorvastatin  40 mg Oral Nightly    budesonide-formoterol  2 puff Inhalation BID    insulin glargine  30 Units SubCUTAneous BID    [Held by provider] metFORMIN  1,000 mg Oral BID WC    mirtazapine  7.5 mg Oral Nightly    traZODone  150 mg Oral Nightly    venlafaxine  150 mg Oral Daily with breakfast    fluticasone  1 spray Each Nostril Daily       Vital Signs:  BP (!) 121/59   Pulse 84   Temp 99.2 °F (37.3 °C) (Oral)   Resp 22   Ht 5' 5\" (1.651 m)   Wt 198 lb 8 oz (90 kg)   LMP  (LMP Unknown)   SpO2 100%   BMI 33.03 kg/m²      Physical Exam:     General Appearance: alert and oriented to person, , well-developed and well-nourished, in no acute distress  Skin: warm and dry, no rash or erythema  Head: normocephalic and atraumatic  Eyes: pupils equal, round, and reactive to light, extraocular eye movements intact, conjunctivae normal  ENT: hearing grossly normal bilaterally  Neck: neck supple and non tender without mass, no thyromegaly or thyroid nodules, no cervical lymphadenopathy   Pulmonary/Chest: clear to auscultation bilaterally- no wheezes, rales or rhonchi, normal air movement, no respiratory distress  Cardiovascular: normal rate, regular rhythm, normal S1 and S2, no murmurs, rubs, clicks or gallops, distal pulses intact, no carotid bruits  Abdomen: soft, non-tender, non-distended, normal bowel sounds, no masses or organomegaly  Extremities: no cyanosis, clubbing or edema  Musculoskeletal: normal range of motion, no joint swelling, deformity or tenderness  Neurologic: no cranial nerve deficit and muscle strength normal    Lab and Imaging Review     CBC  Recent Labs     09/09/22  0441 09/09/22  1750 09/10/22  0457 09/10/22  1127 09/10/22  1913 09/11/22  0446   WBC 3.5  --  9.3  --   --  11.6*   HGB 10.3*   < > 8.6* 8.8* 8.3* 8.1*   HCT 32.3*   < > 27.3* 26.9* 25.1* 24.9*   MCV 96.3  --  96.8  --   --  95.0     --  153  --   --  116*    < > = values in this interval not displayed. BMP  Recent Labs     09/09/22  1415 09/09/22  1750 09/10/22  0457 09/10/22  1127 09/10/22  1913 09/11/22  0446 09/11/22  0447   *   < > 157*  157*   < > 149* 152* 151*   K 3.5*  --  3.5*  --   --  3.1* 3.1*   CL >140*  --  132*  --   --  122* 121*   CO2 18*  --  16*  --   --  22 21   BUN 86*  --  61*  --   --   --  28*   CREATININE 1.92*  --  1.47*  --   --   --  1.00*   GLUCOSE 165*  --  409*  --   --   --  89   CALCIUM 8.2*  --  7.5*  --   --   --  7.3*    < > = values in this interval not displayed.        LFTS  Recent Labs     09/08/22  1124   ALKPHOS 151*   ALT 15   AST 11   PROT 8.9*   BILITOT 0.4   LABALBU 4.0       AMYLASE/LIPASE/AMMONIA  Recent Labs     09/08/22  1124   AMMONIA 12           ANEMIA STUDIES  Recent Labs     09/10/22  1127   LABIRON 51   TIBC 122*   FERRITIN 346*   [id number]   FOLATE 7.7      Latest Reference Range & Units 9/10/22 11:27   Hep A IgM NONREACTIVE  NONREACTIVE   Hepatitis B Surface Ag NONREACTIVE  NONREACTIVE   Hepatitis C Ab NONREACTIVE NONREACTIVE   Hep B Core Ab, IgM NONREACTIVE  NONREACTIVE     FINDINGS:liver us 9/10/22   LIVER:  The liver demonstrates normal echogenicity without evidence of   intrahepatic biliary ductal dilatation. Hepatopetal flow portal vein. Liver   18.3 cm in length. BILIARY SYSTEM:  Gallstones in the gallbladder. Negative sonographic   Gilliland's sign. Mild wall thickening at 4.6 mm. No pericholecystic fluid. Common bile duct is within normal limits measuring 6.8 mm. RIGHT KIDNEY: The right kidney is grossly unremarkable without evidence of   hydronephrosis. PANCREAS:  Visualized portions of the pancreas are unremarkable. OTHER: No evidence of right upper quadrant ascites. Impression   Unremarkable ultrasound the liver. Cholelithiasis with mild gallbladder wall thickening. Negative sonographic   Gilliland's sign. No pericholecystic fluid. Common duct upper normal at 6.8 mm. No intraductal stone demonstrated. Correlation clinical findings and laboratory values required. ASSESSMENT/plan  Anemia and thrombocytopenia,  coffee ground material in ng, ng removed no emesis  -continue ppi  Possible egd tues or wed  Trend hh    2.elevated lft with hx of drug abuse; us shows cholelithiasis, acute hep panel non reactive    3. MARTIN, hypernatremia mgt per nephrology    4. DKA resolved    5. AMS improved from yesterday, oriented to person only    Time spent reviewing chart assessing pt and d/w attending md around 30 minutes    This plan was formulated in collaboration with Dr. Estefani Rey .     Electronically signed by: LAILA Nolan CNP on 9/11/2022 at 7:33 AM

## 2022-09-11 NOTE — PROGRESS NOTES
[facility]    [facility]GRESS NOTE             9/11/2022    7:22 AM    Name:   Allegra Benoit  MRN:     [id number]     Acct:      [de-identified]   Room:   2007/2007-01   Day:  3  Admit Date:  9/8/2022 11:01 AM    PCP:  Lena Wells MD  Code Status:  Full Code    Subjective:     C/C:   Chief Complaint   Patient presents with    Hyperglycemia    Altered Mental Status     Interval History Status: improved. Patient was seen and evaluated at bedside. This morning her mental status is better, she is alert, responsive, follow simple commands but still is confused. Most recent potassium is 3.1, magnesium is 1.4, phosphorus is 1.7, replacement protocols are initiated. Was evaluated by GI for suspected upper GI bleeding, PPI was initiated,  upper endoscopy is planned once electrolyte abnormalities resolve. Brief History:     The patient is a 56 y.o. Non- / non  female with a history of asthma, bipolar 1 disorder, hypertension, stroke, polysubstance abuse, DM 2, degenerative disc disease, and depression with psychotic features, who presented to the ED with altered mental status. The patient was found unresponsive at her home where she lives alone. The patient had not been taking her insulin as her refrigerator was filled with unused insulin vials. The patients family had not heard from the patient for several days and EMS was called. The patient was found confused and obtunded, hypotensive, and hyperglycemic. The ED, the patient was hypotensive (BP 86/64) and tachypneic (RR 24). Blood glucose was 1,034. Serum potassium was 5.5, bicarbonate was 19 mmol/L [anion gap 25 mmol/L). Cr was 3.56 (baseline Cr 0.73). Levophed was administered. The patient was admitted for management of DKA and DKA protocol was initiated. Review of Systems:     Unable to perform, the patient is confused. Medications:      Allergies: Allergies   Allergen Reactions    Bactrim [Sulfamethoxazole-Trimethoprim] Swelling    Adhesive Tape Rash       Current Meds:   Scheduled Meds:    pantoprazole (PROTONIX) 40 mg injection  40 mg IntraVENous BID    insulin lispro  0-4 Units SubCUTAneous TID WC    insulin lispro  0-4 Units SubCUTAneous Nightly    atorvastatin  40 mg Oral Nightly    budesonide-formoterol  2 puff Inhalation BID    insulin glargine  30 Units SubCUTAneous BID    [Held by provider] metFORMIN  1,000 mg Oral BID WC    mirtazapine  7.5 mg Oral Nightly    traZODone  150 mg Oral Nightly    venlafaxine  150 mg Oral Daily with breakfast    fluticasone  1 spray Each Nostril Daily     Continuous Infusions:    sodium chloride      dextrose      dextrose       PRN Meds: potassium chloride, magnesium sulfate, glucose, dextrose bolus **OR** dextrose bolus, dextrose, glucagon (rDNA), dextrose, albuterol sulfate HFA, sodium phosphate IVPB **OR** sodium phosphate IVPB **OR** sodium phosphate IVPB, polyethylene glycol, haloperidol lactate    Data:     Past Medical History:   has a past medical history of Acid reflux, Acute cystitis with hematuria, Acute non-recurrent maxillary sinusitis, Asthma, Bipolar 1 disorder ([address] Bipolar disorder, mixed ([address] BMI 34.0-34.9,adult, Cannabis use disorder, severe, dependence ([address] Cerebrovascular accident (CVA) ([address] Chest pain, Chronic renal insufficiency, Cocaine abuse ([address] COVID-19 virus RNA test result unknown, DDD (degenerative disc disease), cervical, Diabetes mellitus ([address] Dizziness, Fibromyalgia, History of stroke, Homicidal ideation, Hyperglycemia, Hypertension, Hypotension, IDDM (insulin dependent diabetes mellitus), Lupus ([address] Migraine, Neuropathy, Neuropathy, Polysubstance abuse ([address] Post traumatic stress disorder (PTSD), Posttraumatic stress disorder, Recurrent depression ([address] Recurrent major depressive disorder, in partial remission ([address] Screening mammogram, encounter for, Severe recurrent major depression with psychotic features ([facility] Severe recurrent major depression without psychotic features ([facility] Stroke (cerebrum) ([facility] Stroke ([facility] [address] [facility] Suicidal ideation, Suicidal intent, Vitamin D deficiency, and White matter changes. Social History:   reports that she has never smoked. She has never used smokeless tobacco. She reports that she does not currently use alcohol. She reports current drug use. Drugs: Marijuana (Weed) and Cocaine. Family History:   Family History   Problem Relation Age of Onset    Diabetes Mother     Stroke Mother     Diabetes Father     Diabetes Sister     Diabetes Brother     Other Son         GSW    No Known Problems Sister        Vitals:  BP (!) 121/59   Pulse 84   Temp 99.2 °F (37.3 °C) (Oral)   Resp 22   Ht 5' 5\" (1.651 m)   Wt 198 lb 8 oz (90 kg)   LMP  (LMP Unknown)   SpO2 100%   BMI 33.03 kg/m²   Temp (24hrs), Av.1 °F (37.3 °C), Min:98.7 °F (37.1 °C), Max:99.4 °F (37.4 °C)    Recent Labs     09/10/22  0740 09/10/22  1121 09/10/22  1624 09/10/22  1922   POCGLU 390* 297* 212* 198*       I/O(24Hr):     Intake/Output Summary (Last 24 hours) at 2022 0722  Last data filed at 2022 0400  Gross per 24 hour   Intake 5155.92 ml   Output 3375 ml   Net 1780.92 ml       Labs:    [unfilled]    Lab Results   Component Value Date/Time    SPECIAL NOT REPORTED 2022 12:47 PM     Lab Results   Component Value Date/Time    CULTURE NO GROWTH 5 DAYS 2022 12:18 PM       [unfilled]    Radiology:    ECHO Complete 2D W Doppler W Color    Result Date: 9/10/2022  [address] Transthoracic Echocardiography Report (TTE)  Patient Name Angelia Schirmer    Date of Study               2022               [facility]   Date of      1965  Gender                      Female  Birth   Age          56 year(s)  Race                        Black   Room Number          Height:                     65 inch, 165.1 cm   Corporate ID [id number]    Weight: 180 pounds, 81.6 kg  #   Patient Acct [de-identified]   BSA:          1.89 m^2      BMI:      29.95  #                                                              kg/m^2   MR #         [id number]      Sonographer                 Carina Newsome   Accession #  [id number]  Interpreting Physician      [address]   Fellow                   Referring Nurse                           Practitioner   Interpreting             Referring Physician  Fellow  Type of Study   TTE procedure:2D Echocardiogram, M-Mode, Doppler, Color Doppler. Procedure Date Date: 09/09/2022 Start: 12:50 PM Study Location: [address] [place] [address] Technical Quality: Fair visualization Indications:Tachycardia. Patient Status: Inpatient Height: 65 inches Weight: 180 pounds BSA: 1.89 m^2 BMI: 29.95 kg/m^2 Rhythm: Sinus tachycardia HR: 108 bpm BP: 98/55 mmHg CONCLUSIONS Summary Left ventricle is normal in size and wall thickness. Global left ventricular systolic function is normal. Estimated LV EF 60-65 %. No obvious wall motion abnormality seen. Left atrium is normal in size. No significant valvular regurgitation or stenosis seen. Signature ----------------------------------------------------------------------------  Electronically signed by Carina Newsome(Sonographer) on 09/09/2022 02:20  PM ---------------------------------------------------------------------------- ----------------------------------------------------------------------------  Electronically signed by Suleiman Ponce(Interpreting physician) on 09/10/2022  08:12 AM ---------------------------------------------------------------------------- FINDINGS Left Atrium Left atrium is normal in size. Left Ventricle Left ventricle is normal in size and wall thickness. Global left ventricular systolic function is normal. Estimated LV EF 60-65 %. No obvious wall motion abnormality seen. Right Atrium Right atrium is normal in size. Right Ventricle Normal right ventricular size and function.  Mitral Valve No obvious valvular abnormality seen. No evidence of mitral regurgitation. Aortic Valve No obvious valvular abnormality seen. No evidence of aortic insufficiency or stenosis. Tricuspid Valve Tricuspid valve was not well visualized. Insignificant tricuspid regurgitation, unable to estimate RVSP. Pulmonic Valve Pulmonic valve was not well visualized. No evidence of pulmonic insufficiency or stenosis. Pericardial Effusion No significant pericardial effusion is seen. Pleural Effusion No pleural effusion seen. Miscellaneous Normal aortic root dimension. IVC not well visualized. E/E' average = 8.5.  M-mode / 2D Measurements & Calculations:   LVIDd:3.79 cm(3.7 - 5.6 cm)      Diastolic WTGRER:27.0 ml  GOEOV:1.20 cm(2.2 - 4.0 cm)      Systolic RRFYMK:2.06 ml  FPYQ:0.78 cm(0.6 - 1.1 cm)       Aortic Root:3.6 cm(2.0 - 3.7 cm)  LVPWd:0.7 cm(0.6 - 1.1 cm)       LA Dimension: 2.3 cm(1.9 - 4.0 cm)  Fractional Shortenin.55 %    LA volume/Index: 33.7 ml /18m^2  Calculated LVEF (%): 86.38 %     LVOT:1.7 cm   Mitral:                              Aortic   Peak E-Wave: 0.75 m/s                Peak Velocity: 1.51 m/s  Peak A-Wave: 1.15 m/s                Mean Velocity: 0.86 m/s  E/A Ratio: 0.65                      Peak Gradient: 9.12 mmHg  Peak Gradient: 2.26 mmHg             Mean Gradient: 4 mmHg  Deceleration Time: 130 msec                                        Area (continuity): 2.26 cm^2                                       AV VTI: 20.6 cm  Septal Wall E' velocity:0.07 m/s Lateral Wall E' velocity:0.12 m/s    XR ABDOMEN (KUB) (SINGLE AP VIEW)    Result Date: 2022  EXAMINATION: ONE SUPINE XRAY VIEW(S) OF THE ABDOMEN 2022 9:33 am COMPARISON: 2022, 2022 HISTORY: ORDERING SYSTEM PROVIDED HISTORY: NGT placement, dark liquid coming through NG TECHNOLOGIST PROVIDED HISTORY: NGT placement, dark liquid coming through NG Reason for Exam: NGT placement, dark liquid coming through NG FINDINGS: Enteric tube coiled in the body of the stomach. Nonobstructive bowel gas pattern. Mild stool burden. Multiple external devices project over the patient. No acute osseous abnormality identified. Enteric tube coiled at the level of the body of the stomach. Nonobstructive bowel gas pattern. CT HEAD WO CONTRAST    Result Date: 9/8/2022  EXAMINATION: CT OF THE HEAD WITHOUT CONTRAST  9/8/2022 11:38 am TECHNIQUE: CT of the head was performed without the administration of intravenous contrast. Automated exposure control, iterative reconstruction, and/or weight based adjustment of the mA/kV was utilized to reduce the radiation dose to as low as reasonably achievable. COMPARISON: MRI 08/04/2022, 08/03/2022 and CT 08/03/2022. HISTORY: ORDERING SYSTEM PROVIDED HISTORY: Mental Status Changes TECHNOLOGIST PROVIDED HISTORY: Mental Status Changes Decision Support Exception - unselect if not a suspected or confirmed emergency medical condition->Emergency Medical Condition (MA) Is the patient pregnant?->No Reason for Exam: AMS Additional signs and symptoms: AMS FINDINGS: BRAIN/VENTRICLES: There is no acute intracranial hemorrhage, mass effect or midline shift. No abnormal extra-axial fluid collection. Encephalomalacia in the left frontal lobe demonstrated corresponding to recently demonstrated ischemia. Cortical atrophy and chronic white matter changes in the brain and associated ventricular enlargement are again demonstrated. ORBITS: The visualized portion of the orbits demonstrate no acute abnormality. SINUSES: The visualized paranasal sinuses and mastoid air cells demonstrate no acute abnormality. SOFT TISSUES/SKULL:  No acute abnormality of the visualized skull or soft tissues. Chronic findings in the brain without acute CT abnormality identified. Expected evolution of recently demonstrated ischemia in the left frontal lobe.      US LIVER    Result Date: 9/10/2022  EXAMINATION: RIGHT UPPER QUADRANT ULTRASOUND 9/10/2022 11:21 am COMPARISON: None. HISTORY: ORDERING SYSTEM PROVIDED HISTORY: elevated lft TECHNOLOGIST PROVIDED HISTORY: elevated lft FINDINGS: LIVER:  The liver demonstrates normal echogenicity without evidence of intrahepatic biliary ductal dilatation. Hepatopetal flow portal vein. Liver 18.3 cm in length. BILIARY SYSTEM:  Gallstones in the gallbladder. Negative sonographic Gilliland's sign. Mild wall thickening at 4.6 mm. No pericholecystic fluid. Common bile duct is within normal limits measuring 6.8 mm. RIGHT KIDNEY: The right kidney is grossly unremarkable without evidence of hydronephrosis. PANCREAS:  Visualized portions of the pancreas are unremarkable. OTHER: No evidence of right upper quadrant ascites. Unremarkable ultrasound the liver. Cholelithiasis with mild gallbladder wall thickening. Negative sonographic Gilliland's sign. No pericholecystic fluid. Common duct upper normal at 6.8 mm. No intraductal stone demonstrated. Correlation clinical findings and laboratory values required. XR CHEST PORTABLE    Result Date: 9/8/2022  EXAMINATION: ONE XRAY VIEW OF THE CHEST 9/8/2022 7:53 pm COMPARISON: 08/17/2022 HISTORY: ORDERING SYSTEM PROVIDED HISTORY: line placement TECHNOLOGIST PROVIDED HISTORY: line placement Reason for Exam: Line placement FINDINGS: Central venous catheter tip overlies the right atrium. No pleural effusion or pneumothorax. Heart size is at the upper limits of normal.     Right central venous catheter tip overlies the right atrium. No pneumothorax. XR CHEST PORTABLE    Result Date: 8/17/2022  EXAMINATION: ONE XRAY VIEW OF THE CHEST 8/17/2022 8:09 pm COMPARISON: None. HISTORY: ORDERING SYSTEM PROVIDED HISTORY: chest pain TECHNOLOGIST PROVIDED HISTORY: chest pain FINDINGS: Chest radiograph: The cardiomediastinal silhouette and hilar contours are normal. The lungs are clear with no focal consolidation, pleural effusion or pneumothorax.  The overlying soft tissue and osseous structures do not demonstrate acute abnormality. No acute intrathoracic pathology. US RETROPERITONEAL COMPLETE    Result Date: 9/8/2022  EXAMINATION: RETROPERITONEAL ULTRASOUND OF THE KIDNEYS AND URINARY BLADDER 9/8/2022 COMPARISON: None HISTORY: ORDERING SYSTEM PROVIDED HISTORY: Acute kidney injury TECHNOLOGIST PROVIDED HISTORY: Acute kidney injury 79-year-old female with acute kidney injury FINDINGS: Kidneys: Right kidney measures 10.4 x 4.3 x 5.0 cm. Right renal cortical thickness measures 1.7 cm. Left kidney measures 9.4 x 4.8 x 4.8 cm. Left renal cortical thickness measures 1.8 cm. No hydronephrosis or perinephric fluid. No echogenic foci with posterior acoustic shadowing to suggest renal calculi. Gross preservation of the bilateral corticomedullary differentiation. Bladder: Not imaged. Unremarkable renal ultrasound. No hydronephrosis. Physical Examination:        Constitutional:       Appearance: She is ill-appearing. HENT:      Head: Normocephalic and atraumatic. Nose: No congestion or rhinorrhea. Eyes:      Extraocular Movements: Extraocular movements intact. Conjunctiva/sclera: Conjunctivae normal.      Pupils: Pupils are equal, round, and reactive to light. Cardiovascular:      Rate and Rhythm: Tachycardia present. Pulses: Normal pulses. Heart sounds: Normal heart sounds. No murmur heard. No friction rub. No gallop. Pulmonary:      Effort: Pulmonary effort is normal. No respiratory distress. Breath sounds: Normal breath sounds. No wheezing or rales. Abdominal:      General: Abdomen is flat. Bowel sounds are normal. There is no distension. Palpations: There is no mass. Tenderness: There is no guarding. Musculoskeletal:      Right lower leg: No edema. Left lower leg: No edema. Skin:     Capillary Refill: Capillary refill takes less than 2 seconds. Neurological:      General: No focal deficit present.       Mental Status: She is alert but confused and disoriented. Sensory: No sensory deficit. Motor: No weakness. Psychiatric: was not accessed due to altered mental status      Assessment:        Primary Problem  DKA, type 1, not at goal [facility])    Active Hospital Problems    Diagnosis Date Noted    Unspecified mood (affective) disorder ([facility] [address] [F39] 09/10/2022     Priority: Medium    Uremic encephalopathy [G93.49, N19] 2022     Priority: Medium    DKA, type 1, not at goal [facility]) [E10.10] 2022     Priority: Medium    DKA, type 2, not at goal [facility]) [E11.10] 2022     Priority: Medium    Cocaine abuse ([facility] [F14.10] 2018       Plan:        DKA, 2/2 poor insulin compliance, resolving  -Glucose on admssion: 1,034; glucose today 80  -this morning Na+: 151; K+ 3.1; Mg is 1. 4; Ph is 1.9, replacement protocols are initiated  -BMP q4h, initial Mag and Phos levels  -Glucose checks 4 times daily  -Home Lantus 30 units BID   - Home metformin on hold  -IV Normal Saline at 250 mL/hr  -Hypoglycemia, phos and potassium replacement protocols in place   -General surgery placed central line      Acute Kidney Injury pre-renal azotemia secondary to dehydration  -Baseline Cr 0.73;  Cr on admission: 3.56; Cr today 1.00  -Change IV NS infusion to 0.45 NS at 150 ml/hr  -BMP q4 hours  -monitor urine output  -Nephrology consulted      Suspicion for upper GI bleeding  -Coffee-ground emesis from his NG tube, eventually become bilious  -Globin had been steadily climbing since admission, currently is stable around 8  -GI on board  -PPI started  -Upper endoscopy is planned once electrolyte abnormalities resolve    Hypotension secondary to dehydration by osmotic diuresis  -BP today:113/61  -Free water deficit 10 L  -Continue NS infusion     Hypernatremia  -Na+ this mornin;   -Free water deficit 10 L  -NG tube placed for free water administration (on hold)  -Continue IV NS infusion to 0.45 NS at 150 ml/hr      Acute metabolic encephalopathy s/s to electrolyte abnormality  -Restraints in place; patient oriented to person only  -Nephrology on board; electrolyte replacement       Polysubstance abuse  -Urine tox positive for Fentanyl and cocaine  -Psychiatry consulted      Ventricular tachycardia  -Non-sustained VT secondary to electrolye imbalance, metabolic derangement and cocaine  -Continue to manage hyperglycemia and electrolytes  -Discontinue metoprolol  -Echo ordered: Estimated LV EF 60-65  -Cardiology on board. Depression with psychotic features  -Continue Haloperidol lactate 5 mg IM q 6 hours PRN  -Continue Mirtazapine 7.5 mg po nightly  -Continue Trazodone 150 mg po nightly  -Continue Venlafaxine 150 mg po daily  -Psychiatry consulted     Asthma  -Continue home Proventil ventolin 2 puff q 4 hours as needed   -Continue home Symbicort 2 puffs BID  -Continue home Flonase 1 spray each nostril daily      Code: Full code  Diet: Adult diet, 3 carbs  DVT prophylaxis: on hold    Annabell Claude, MD  9/11/2022  7:22 AM      Attending Physician Statement  I have discussed the care of [address] [place] [address] and I have examined the patient myselft and taken ros and hpi , including pertinent history and exam findings,  with the resident. I have reviewed the key elements of all parts of the encounter with the resident. I agree with the assessment, plan and orders as documented by the resident.     Patient is still confused hyponatremia slowly improving Case was discussed with psychiatrist in person yesterday continue supportive care electrolyte management complicated psychiatry and medical history expected prolonged stay and will need rehabilitation due to severe deconditioning  Electronically signed by Myrna Montoya MD

## 2022-09-11 NOTE — PROGRESS NOTES
Pulmonary Progress Note  [facility]      Patient - Matthias Angel,  Age - 56 y.o.    - 1965      Room Number - 2112/2112-01   MRN -  [id number]   [facility]  Date of Admission -  2022 11:01 AM    Consulting Service/Physician   Consulting - Dominique Bonner MD  Primary Care Physician - Umang Malagon MD     SUBJECTIVE   Patient appears to be in decent spirits. Was seen in the progressive unit. Alert and lucid. Blood pressure stable. Not on any oxygen. Eating dinner    OBJECTIVE   VITALS    height is 5' 5\" (1.651 m) and weight is 198 lb 8 oz (90 kg). Her temperature is 99 °F (37.2 °C). Her blood pressure is 120/70 and her pulse is 82. Her respiration is 18 and oxygen saturation is 99%. Body mass index is 33.03 kg/m². Temperature Range: Temp: 99 °F (37.2 °C) Temp  Av °F (37.2 °C)  Min: 98.4 °F (36.9 °C)  Max: 99.4 °F (37.4 °C)  BP Range:  Systolic (01TTD), QLU:561 , Min:110 , UNQ:448     Diastolic (60RYN), VKX:97, Min:51, Max:70    Pulse Range: Pulse  Av.7  Min: 82  Max: 101  Respiration Range: Resp  Av.4  Min: 15  Max: 26  Current Pulse Ox[de-identified]  SpO2: 99 %  24HR Pulse Ox Range:  SpO2  Av.2 %  Min: 90 %  Max: 100 %  Oxygen Amount and Delivery: Wt Readings from Last 3 Encounters:   09/10/22 198 lb 8 oz (90 kg)   22 240 lb (108.9 kg)   22 236 lb (107 kg)       I/O (24 Hours)    Intake/Output Summary (Last 24 hours) at 2022 1858  Last data filed at 2022 1836  Gross per 24 hour   Intake 2500 ml   Output 3400 ml   Net -900 ml       EXAM     General Appearance  Awake, alert, oriented, in no acute distress  HEENT - normocephalic, atraumatic. Neck - Supple,  trachea midline   Lungs -coarse breath sounds clear no crackles rales or wheeze  Heart Exam:PMI normal. No lifts, heaves, or thrills. RRR. No murmurs, clicks, gallops, or rubs  Abdomen Exam: Abdomen soft, non-tender.  BS normal. No masses,  No organomegaly  Extremity Exam: No signs of cyanosis    MEDS      pantoprazole (PROTONIX) 40 mg injection  40 mg IntraVENous BID    insulin lispro  0-4 Units SubCUTAneous TID WC    insulin lispro  0-4 Units SubCUTAneous Nightly    atorvastatin  40 mg Oral Nightly    budesonide-formoterol  2 puff Inhalation BID    insulin glargine  30 Units SubCUTAneous BID    [Held by provider] metFORMIN  1,000 mg Oral BID WC    mirtazapine  7.5 mg Oral Nightly    traZODone  150 mg Oral Nightly    venlafaxine  150 mg Oral Daily with breakfast    fluticasone  1 spray Each Nostril Daily      sodium chloride 100 mL/hr at 09/11/22 0736    dextrose      dextrose       potassium chloride, magnesium sulfate, acetaminophen, glucose, dextrose bolus **OR** dextrose bolus, dextrose, glucagon (rDNA), dextrose, albuterol sulfate HFA, sodium phosphate IVPB **OR** sodium phosphate IVPB **OR** sodium phosphate IVPB, polyethylene glycol, haloperidol lactate    LABS   CBC   Recent Labs     09/11/22  0446 09/11/22  1104   WBC 11.6*  --    HGB 8.1* 8.0*   HCT 24.9* 24.9*   MCV 95.0  --    *  --      BMP:   Lab Results   Component Value Date/Time     09/11/2022 11:04 AM    K 3.4 09/11/2022 11:04 AM     09/11/2022 11:04 AM    CO2 21 09/11/2022 11:04 AM    BUN 28 09/11/2022 04:47 AM    LABALBU 4.0 09/08/2022 11:24 AM    CREATININE 1.00 09/11/2022 04:47 AM    CALCIUM 7.3 09/11/2022 04:47 AM    GFRAA >60 09/11/2022 04:47 AM    LABGLOM 58 09/11/2022 04:47 AM     ABGs:  Lab Results   Component Value Date/Time    PHART 7.365 09/09/2022 11:05 AM    PO2ART 76.4 09/09/2022 11:05 AM    HVA6PBD 31.6 09/09/2022 11:05 AM      Lab Results   Component Value Date/Time    MODE NOT REPORTED 11/04/2021 05:23 PM     Ionized Calcium:  No results found for: IONCA  Magnesium:    Lab Results   Component Value Date/Time    MG 1.7 09/11/2022 11:04 AM     Phosphorus:    Lab Results   Component Value Date/Time    PHOS 2.8 09/11/2022 11:04 AM        LIVER PROFILE No results for input(s): AST, ALT, LIPASE, BILIDIR, BILITOT, ALKPHOS in the last 72 hours. Invalid input(s): AMYLASE,  ALB  INR No results for input(s): INR in the last 72 hours.   PTT   Lab Results   Component Value Date    APTT 23.2 08/04/2022         RADIOLOGY     (See actual reports for details)    ASSESSMENT/PLAN     Patient Active Problem List   Diagnosis    IDDM (insulin dependent diabetes mellitus)    Lupus (HCC)    Post traumatic stress disorder (PTSD)    Acute cystitis with hematuria    Hyperglycemia due to diabetes mellitus ([facility])    Suicidal ideation    Arthritis    Chronic back pain    Acid reflux    History of stroke    Polysubstance abuse ([facility])    Cocaine abuse ([address]    Chest pain    Acute non-recurrent maxillary sinusitis    Acute respiratory failure with hypercapnia ([facility])    Alcohol use disorder, severe, dependence ([address]    Asthma exacerbation attacks    Bilateral edema of lower extremity    Bipolar 1 disorder, depressed, severe ([facility])    BMI 34.0-34.9,adult    Cannabis use disorder, severe, dependence ([facility])    Cocaine use disorder, severe, dependence ([address]    Dizziness    Dyslipidemia    Family history of colon cancer    History of lupus    Homicidal ideation    Hypotension    Neuropathy    Absolute anemia    Recurrent depression (HCC)    Recurrent major depressive disorder, in partial remission (HCC)    Severe recurrent major depression with psychotic features ([address]    Severe recurrent major depression without psychotic features ([address]    Upper GI bleed    Vitamin D deficiency    Acute encephalopathy    Gastroesophageal reflux disease    Brain lesion    Rib lesion    Diabetes mellitus type 2 in obese ([facility])    YAEL (generalized anxiety disorder)    Posttraumatic stress disorder    Suicidal intent    Leg weakness, bilateral    Poorly controlled diabetes mellitus (HCC)    Felon of finger    Osteomyelitis ([facility])    Recurrent falls    Seasonal allergic rhinitis    Fibromyalgia    Tenosynovitis of finger    Open wound of right index finger    Adverse effect of COVID-19 vaccine    Wound dehiscence    Amputation finger    Abscess of right index finger    Type 2 diabetes mellitus without complication, without long-term current use of insulin (HCC)    Major depression    Right foot infection    Cellulitis and abscess of toe of right foot    Abscess of right foot    Bilateral cellulitis of lower leg related to related to  uncontrolled diabetes    Bipolar disorder, current episode mixed, unspecified (HCC)    Right foot ulcer, with fat layer exposed ([address]    Ulcer of left foot, with fat layer exposed ([address]    CRP elevated    Status post incision and drainage    Intractable headache    Type 2 diabetes mellitus with diabetic autonomic neuropathy, with long-term current use of insulin ([facility])    Somnolence    Cerebral infarction due to embolism of left middle cerebral artery ([facility])    DKA, type 1, not at goal [facility])    DKA, type 2, not at goal [facility])    Uremic encephalopathy    Unspecified mood (affective) disorder ([facility])    Coffee ground emesis    Elevated LFTs     IMPRESSION:  1. Encephalopathy. Improving  2. Anion gap acidosis was not elevated 1 hour normal 1  3. Elevated beta hydroxybutyrate with glucose of 1034, concern for DKA. 4.  Acute kidney injury. BUN 29 and 3.56 upon admission down to 28/1.0  5. Hyperkalemia,-- resolved  6. Hyper natremia improving down to 150  7. History of cocaine and marijuana use. --he patient's acetaminophen level, salicylate  level and ethanol level are all normal.  8. Tox screen positive for fentanyl and cocaine  9   Bipolar affective disorder. 10.. Poor p.o. intake. --Now eating. 11.  Full code    Prior to transfer to [facility], nursing staff requested that I okay transferring patient out of ICU with central line as patient reportedly has poor IV access. Discussed that with bedside nurse, Tyrone Harding. Once peripheral access can be obtained, suspect central line can be discontinued.     Currently the patient active issues are being managed by other services. Please call if we can be of further assistance. We will sign off.   Thank you      Electronically signed by Harrison Lee MD on 9/11/2022 at 6:58 PM

## 2022-09-11 NOTE — PLAN OF CARE
Problem: Discharge Planning  Goal: Discharge to home or other facility with appropriate resources  9/11/2022 0426 by Karishma Landaverde RN  Outcome: Progressing  9/10/2022 1742 by Claire Gallardo RN  Outcome: Progressing  Flowsheets (Taken 9/10/2022 0800)  Discharge to home or other facility with appropriate resources: Refer to discharge planning if patient needs post-hospital services based on physician order or complex needs related to functional status, cognitive ability or social support system     Problem: Pain  Goal: Verbalizes/displays adequate comfort level or baseline comfort level  9/11/2022 0426 by Karishma Landaverde RN  Outcome: Progressing  9/10/2022 1742 by Claire Gallardo RN  Outcome: Progressing  Flowsheets  Taken 9/10/2022 1600  Verbalizes/displays adequate comfort level or baseline comfort level:   Encourage patient to monitor pain and request assistance   Assess pain using appropriate pain scale   Administer analgesics based on type and severity of pain and evaluate response  Taken 9/10/2022 1200  Verbalizes/displays adequate comfort level or baseline comfort level:   Encourage patient to monitor pain and request assistance   Assess pain using appropriate pain scale   Administer analgesics based on type and severity of pain and evaluate response     Problem: Skin/Tissue Integrity  Goal: Absence of new skin breakdown  Description: 1. Monitor for areas of redness and/or skin breakdown  2. Assess vascular access sites hourly  3. Every 4-6 hours minimum:  Change oxygen saturation probe site  4. Every 4-6 hours:  If on nasal continuous positive airway pressure, respiratory therapy assess nares and determine need for appliance change or resting period.   9/11/2022 0426 by Karishma Landaverde RN  Outcome: Progressing  9/10/2022 1742 by Claire Gallardo RN  Outcome: Progressing     Problem: ABCDS Injury Assessment  Goal: Absence of physical injury  9/11/2022 0426 by Karishma Landaverde RN  Outcome: Progressing  9/10/2022 1742 by Allie Mcdonald RN  Outcome: Progressing  Flowsheets (Taken 9/10/2022 1227)  Absence of Physical Injury: Implement safety measures based on patient assessment     Problem: Safety - Adult  Goal: Free from fall injury  9/11/2022 0426 by Gregorio Ramirez RN  Outcome: Progressing  9/10/2022 1742 by Allie Mcdonald RN  Outcome: Progressing  Flowsheets (Taken 9/10/2022 1227)  Free From Fall Injury: Instruct family/caregiver on patient safety     Problem: Neurosensory - Adult  Goal: Achieves stable or improved neurological status  Outcome: Progressing  Flowsheets (Taken 9/10/2022 1600 by Allie Mcdonald RN)  Achieves stable or improved neurological status: Initiate measures to prevent increased intracranial pressure  Goal: Absence of seizures  Outcome: Progressing  Flowsheets (Taken 9/10/2022 1600 by Allie Mcdonald RN)  Absence of seizures:   Monitor for seizure activity.   If seizure occurs, document type and location of movements and any associated apnea   If seizure occurs, turn head to side and suction secretions as needed  Goal: Remains free of injury related to seizures activity  Outcome: Progressing  Flowsheets (Taken 9/10/2022 1600 by Allie Mcdonald RN)  Remains free of injury related to seizure activity: Monitor patient for seizure activity, document and report duration and description of seizure to Licensed Independent Practitioner  Goal: Achieves maximal functionality and self care  Outcome: Progressing  Flowsheets (Taken 9/10/2022 1600 by Allie Mcdonald RN)  Achieves maximal functionality and self care:   Monitor swallowing and airway patency with patient fatigue and changes in neurological status   Encourage and assist patient to increase activity and self care with guidance from physical therapy/occupational therapy     Problem: Cardiovascular - Adult  Goal: Maintains optimal cardiac output and hemodynamic stability  Outcome: Progressing  Flowsheets (Taken 9/10/2022 1600 by Allie Mcdonald RN)  Maintains optimal cardiac output and hemodynamic stability:   Monitor blood pressure and heart rate   Monitor urine output and notify Licensed Independent Practitioner for values outside of normal range  Goal: Absence of cardiac dysrhythmias or at baseline  Outcome: Progressing  Flowsheets (Taken 9/10/2022 1600 by Dianna King RN)  Absence of cardiac dysrhythmias or at baseline:   Monitor cardiac rate and rhythm   Assess for signs of decreased cardiac output     Problem: Skin/Tissue Integrity - Adult  Goal: Skin integrity remains intact  Outcome: Progressing  Flowsheets (Taken 9/10/2022 1600 by Dianna King RN)  Skin Integrity Remains Intact:   Monitor for areas of redness and/or skin breakdown   Assess vascular access sites hourly  Goal: Incisions, wounds, or drain sites healing without S/S of infection  Outcome: Progressing  Flowsheets (Taken 9/10/2022 1600 by Dianna King RN)  Incisions, Wounds, or Drain Sites Healing Without Sign and Symptoms of Infection:   ADMISSION and DAILY: Assess and document risk factors for pressure ulcer development   TWICE DAILY: Assess and document skin integrity  Goal: Oral mucous membranes remain intact  Outcome: Progressing  Flowsheets (Taken 9/10/2022 1600 by Dianna King RN)  Oral Mucous Membranes Remain Intact:   Assess oral mucosa and hygiene practices   Implement preventative oral hygiene regimen     Problem: Musculoskeletal - Adult  Goal: Return mobility to safest level of function  Outcome: Progressing  Flowsheets (Taken 9/10/2022 1600 by Dianna King RN)  Return Mobility to Safest Level of Function:   Assess patient stability and activity tolerance for standing, transferring and ambulating with or without assistive devices   Assist with transfers and ambulation using safe patient handling equipment as needed  Goal: Return ADL status to a safe level of function  Outcome: Progressing  Flowsheets (Taken 9/10/2022 1600 by Dianna King RN)  Return ADL Status to a Safe Level of Function:   Assess activities of daily living deficits and provide assistive devices as needed   Administer medication as ordered     Problem: Gastrointestinal - Adult  Goal: Minimal or absence of nausea and vomiting  Outcome: Progressing  Flowsheets (Taken 9/10/2022 1600 by Jayant Muro, RN)  Minimal or absence of nausea and vomiting:   Administer IV fluids as ordered to ensure adequate hydration   Nasogastric tube to low intermittent suction as ordered  Goal: Maintains or returns to baseline bowel function  Outcome: Progressing  Flowsheets (Taken 9/10/2022 1600 by Jayant Muro, RN)  [address] or returns to baseline bowel function:   Assess bowel function   Encourage oral fluids to ensure adequate hydration  Goal: Maintains adequate nutritional intake  Outcome: Progressing  Flowsheets (Taken 9/10/2022 1600 by Jayant Muro, RN)  Maintains adequate nutritional intake:   Monitor percentage of each meal consumed   Identify factors contributing to decreased intake, treat as appropriate     Problem: Genitourinary - Adult  Goal: Absence of urinary retention  Outcome: Progressing  Flowsheets (Taken 9/10/2022 1600 by Jayant Muro RN)  Absence of urinary retention:   Assess patients ability to void and empty bladder   Monitor intake/output and perform bladder scan as needed  Goal: Urinary catheter remains patent  Outcome: Progressing  Flowsheets (Taken 9/10/2022 1600 by Jayant Muro, RN)  Urinary catheter remains patent:   Assess patency of urinary catheter   Irrigate catheter per Licensed Independent Practitioner order if indicated and notify Licensed Independent Practitioner if unable to irrigate     Problem: Infection - Adult  Goal: Absence of infection at discharge  Outcome: Progressing  Goal: Absence of infection during hospitalization  Outcome: Progressing     Problem: Metabolic/Fluid and Electrolytes - Adult  Goal: Electrolytes maintained within normal limits  Outcome: Progressing  Goal: Hemodynamic stability and optimal renal function maintained  Outcome: Progressing  Goal: Glucose maintained within prescribed range  Outcome: Progressing     Problem: Hematologic - Adult  Goal: Maintains hematologic stability  Outcome: Progressing     Problem: Safety - Medical Restraint  Goal: Remains free of injury from restraints (Restraint for Interference with Medical Device)  Description: INTERVENTIONS:  1. Determine that other, less restrictive measures have been tried or would not be effective before applying the restraint  2. Evaluate the patient's condition at the time of restraint application  3. Inform patient/family regarding the reason for restraint  4.  Q2H: Monitor safety, psychosocial status, comfort, nutrition and hydration  Outcome: Progressing  Flowsheets (Taken 9/10/2022 1900 by Leah Chaudhary RN)  Remains free of injury from restraints (restraint for interference with medical device):   Determine that other, less restrictive measures have been tried or would not be effective before applying the restraint   Evaluate the patient's condition at the time of restraint application     Problem: Chronic Conditions and Co-morbidities  Goal: Patient's chronic conditions and co-morbidity symptoms are monitored and maintained or improved  Outcome: Progressing

## 2022-09-12 LAB
ANION GAP SERPL CALCULATED.3IONS-SCNC: 8 MMOL/L (ref 9–17)
ANION GAP SERPL CALCULATED.3IONS-SCNC: 9 MMOL/L (ref 9–17)
CHLORIDE BLD-SCNC: 111 MMOL/L (ref 98–107)
CHLORIDE BLD-SCNC: 115 MMOL/L (ref 98–107)
CO2: 22 MMOL/L (ref 20–31)
CO2: 22 MMOL/L (ref 20–31)
GLUCOSE BLD-MCNC: 129 MG/DL (ref 65–105)
GLUCOSE BLD-MCNC: 194 MG/DL (ref 65–105)
GLUCOSE BLD-MCNC: 81 MG/DL (ref 65–105)
GLUCOSE BLD-MCNC: 84 MG/DL (ref 65–105)
HCT VFR BLD CALC: 23.6 % (ref 36–46)
HEMOGLOBIN: 7.6 G/DL (ref 12–16)
MAGNESIUM: 1.4 MG/DL (ref 1.6–2.6)
MAGNESIUM: 1.6 MG/DL (ref 1.6–2.6)
PHOSPHORUS: 1.9 MG/DL (ref 2.6–4.5)
PHOSPHORUS: 2.2 MG/DL (ref 2.6–4.5)
POTASSIUM SERPL-SCNC: 3.2 MMOL/L (ref 3.7–5.3)
POTASSIUM SERPL-SCNC: 3.3 MMOL/L (ref 3.7–5.3)
SODIUM BLD-SCNC: 141 MMOL/L (ref 135–144)
SODIUM BLD-SCNC: 146 MMOL/L (ref 135–144)

## 2022-09-12 PROCEDURE — 6360000002 HC RX W HCPCS: Performed by: STUDENT IN AN ORGANIZED HEALTH CARE EDUCATION/TRAINING PROGRAM

## 2022-09-12 PROCEDURE — APPSS30 APP SPLIT SHARED TIME 16-30 MINUTES: Performed by: NURSE PRACTITIONER

## 2022-09-12 PROCEDURE — C9113 INJ PANTOPRAZOLE SODIUM, VIA: HCPCS | Performed by: STUDENT IN AN ORGANIZED HEALTH CARE EDUCATION/TRAINING PROGRAM

## 2022-09-12 PROCEDURE — 83735 ASSAY OF MAGNESIUM: CPT

## 2022-09-12 PROCEDURE — 2580000003 HC RX 258: Performed by: STUDENT IN AN ORGANIZED HEALTH CARE EDUCATION/TRAINING PROGRAM

## 2022-09-12 PROCEDURE — 6370000000 HC RX 637 (ALT 250 FOR IP): Performed by: STUDENT IN AN ORGANIZED HEALTH CARE EDUCATION/TRAINING PROGRAM

## 2022-09-12 PROCEDURE — 2580000003 HC RX 258: Performed by: INTERNAL MEDICINE

## 2022-09-12 PROCEDURE — 85018 HEMOGLOBIN: CPT

## 2022-09-12 PROCEDURE — 84100 ASSAY OF PHOSPHORUS: CPT

## 2022-09-12 PROCEDURE — 99233 SBSQ HOSP IP/OBS HIGH 50: CPT | Performed by: INTERNAL MEDICINE

## 2022-09-12 PROCEDURE — 82947 ASSAY GLUCOSE BLOOD QUANT: CPT

## 2022-09-12 PROCEDURE — 6360000002 HC RX W HCPCS: Performed by: INTERNAL MEDICINE

## 2022-09-12 PROCEDURE — 80051 ELECTROLYTE PANEL: CPT

## 2022-09-12 PROCEDURE — 2500000003 HC RX 250 WO HCPCS: Performed by: STUDENT IN AN ORGANIZED HEALTH CARE EDUCATION/TRAINING PROGRAM

## 2022-09-12 PROCEDURE — 85014 HEMATOCRIT: CPT

## 2022-09-12 PROCEDURE — A4216 STERILE WATER/SALINE, 10 ML: HCPCS | Performed by: STUDENT IN AN ORGANIZED HEALTH CARE EDUCATION/TRAINING PROGRAM

## 2022-09-12 PROCEDURE — 2060000000 HC ICU INTERMEDIATE R&B

## 2022-09-12 PROCEDURE — 36415 COLL VENOUS BLD VENIPUNCTURE: CPT

## 2022-09-12 RX ORDER — SODIUM CHLORIDE 9 MG/ML
INJECTION, SOLUTION INTRAVENOUS CONTINUOUS
Status: DISCONTINUED | OUTPATIENT
Start: 2022-09-12 | End: 2022-09-22 | Stop reason: HOSPADM

## 2022-09-12 RX ORDER — SODIUM CHLORIDE AND POTASSIUM CHLORIDE .9; .15 G/100ML; G/100ML
SOLUTION INTRAVENOUS CONTINUOUS
Status: DISCONTINUED | OUTPATIENT
Start: 2022-09-12 | End: 2022-09-12

## 2022-09-12 RX ADMIN — MIRTAZAPINE 7.5 MG: 15 TABLET, FILM COATED ORAL at 20:01

## 2022-09-12 RX ADMIN — POTASSIUM CHLORIDE 10 MEQ: 7.46 INJECTION, SOLUTION INTRAVENOUS at 18:09

## 2022-09-12 RX ADMIN — POTASSIUM CHLORIDE 10 MEQ: 7.46 INJECTION, SOLUTION INTRAVENOUS at 06:14

## 2022-09-12 RX ADMIN — POTASSIUM CHLORIDE 10 MEQ: 7.46 INJECTION, SOLUTION INTRAVENOUS at 21:11

## 2022-09-12 RX ADMIN — SODIUM CHLORIDE: 9 INJECTION, SOLUTION INTRAVENOUS at 16:21

## 2022-09-12 RX ADMIN — INSULIN GLARGINE 30 UNITS: 100 INJECTION, SOLUTION SUBCUTANEOUS at 20:14

## 2022-09-12 RX ADMIN — MAGNESIUM SULFATE HEPTAHYDRATE 1000 MG: 1 INJECTION, SOLUTION INTRAVENOUS at 20:10

## 2022-09-12 RX ADMIN — SODIUM CHLORIDE 40 MG: 9 INJECTION INTRAMUSCULAR; INTRAVENOUS; SUBCUTANEOUS at 20:01

## 2022-09-12 RX ADMIN — SODIUM PHOSPHATE, MONOBASIC, MONOHYDRATE AND SODIUM PHOSPHATE, DIBASIC, ANHYDROUS 15 MMOL: 276; 142 INJECTION, SOLUTION INTRAVENOUS at 21:20

## 2022-09-12 RX ADMIN — SODIUM CHLORIDE: 9 INJECTION, SOLUTION INTRAVENOUS at 23:41

## 2022-09-12 RX ADMIN — ATORVASTATIN CALCIUM 40 MG: 40 TABLET, FILM COATED ORAL at 20:01

## 2022-09-12 RX ADMIN — TRAZODONE HYDROCHLORIDE 150 MG: 100 TABLET ORAL at 20:00

## 2022-09-12 RX ADMIN — POTASSIUM CHLORIDE 10 MEQ: 7.46 INJECTION, SOLUTION INTRAVENOUS at 19:29

## 2022-09-12 RX ADMIN — POTASSIUM CHLORIDE 10 MEQ: 7.46 INJECTION, SOLUTION INTRAVENOUS at 22:27

## 2022-09-12 RX ADMIN — MAGNESIUM SULFATE HEPTAHYDRATE 1000 MG: 1 INJECTION, SOLUTION INTRAVENOUS at 18:44

## 2022-09-12 NOTE — PROGRESS NOTES
[facility]    Gastroenterology Daily Progress Note      Patient:   Ronald Hardy   :    1965   Facility:   [facility]  Date:     2022  Consultant:   LAILA Rondon CNP, CNP      SUBJECTIVE  56 y.o. female admitted 2022 with DKA, type 1, not at goal [facility]) [E10.10]  DKA, type 2, not at goal [facility]) [E11.10]  Acute renal failure, unspecified acute renal failure type ([facility] [N17.9]  Diabetic ketoacidosis with coma associated with type 1 diabetes mellitus ([facility] [E10.11] and seen for anemia with coffee ground emesis. The pt was seen and examined. Remains oriented to person only. No c/o abdominal pain, no BM overnight, no emesis. Tolerating a diet.          OBJECTIVE  Scheduled Meds:   pantoprazole (PROTONIX) 40 mg injection  40 mg IntraVENous BID    insulin lispro  0-4 Units SubCUTAneous TID WC    insulin lispro  0-4 Units SubCUTAneous Nightly    atorvastatin  40 mg Oral Nightly    budesonide-formoterol  2 puff Inhalation BID    insulin glargine  30 Units SubCUTAneous BID    [Held by provider] metFORMIN  1,000 mg Oral BID WC    mirtazapine  7.5 mg Oral Nightly    traZODone  150 mg Oral Nightly    venlafaxine  150 mg Oral Daily with breakfast    fluticasone  1 spray Each Nostril Daily       Vital Signs:  /83   Pulse 87   Temp 99 °F (37.2 °C)   Resp 18   Ht 5' 5\" (1.651 m)   Wt 198 lb 8 oz (90 kg)   LMP  (LMP Unknown)   SpO2 98%   PF (!) 15 L/min   BMI 33.03 kg/m²      Physical Exam:   General Appearance: alert and oriented to person, , well-developed and well-nourished, in no acute distress  Skin: warm and dry, no rash or erythema  Head: normocephalic and atraumatic  Eyes: pupils equal, round, and reactive to light, extraocular eye movements intact, conjunctivae normal  ENT: hearing grossly normal bilaterally  Neck: neck supple and non tender without mass, no thyromegaly or thyroid nodules, no cervical lymphadenopathy   Pulmonary/Chest: clear to auscultation bilaterally- no wheezes, rales or rhonchi, normal air movement, no respiratory distress  Cardiovascular: normal rate, regular rhythm, normal S1 and S2, no murmurs, rubs, clicks or gallops, distal pulses intact, no carotid bruits  Abdomen: soft, non-tender, non-distended, normal bowel sounds, no masses or organomegaly  Extremities: no cyanosis, clubbing or edema  Musculoskeletal: normal range of motion, no joint swelling, deformity or tenderness  Neurologic: oriented to person only and muscle strength normal    Lab and Imaging Review     CBC  Recent Labs     09/10/22  0457 09/10/22  1127 09/11/22  0446 09/11/22  1104 09/11/22 1921   WBC 9.3  --  11.6*  --   --    HGB 8.6*   < > 8.1* 8.0* 8.4*   HCT 27.3*   < > 24.9* 24.9* 25.9*   MCV 96.8  --  95.0  --   --      --  116*  --   --     < > = values in this interval not displayed. BMP  Recent Labs     09/09/22  1415 09/09/22  1750 09/10/22  0457 09/10/22  1127 09/11/22  0447 09/11/22  1104 09/11/22  1921 09/11/22  2338   *   < > 157*  157*   < > 151* 150* 147* 146*   K 3.5*  --  3.5*   < > 3.1* 3.4* 3.7 3.2*   CL >140*  --  132*   < > 121* 120* 116* 115*   CO2 18*  --  16*   < > 21 21 21 22   BUN 86*  --  61*  --  28*  --   --   --    CREATININE 1.92*  --  1.47*  --  1.00*  --   --   --    GLUCOSE 165*  --  409*  --  89  --   --   --    CALCIUM 8.2*  --  7.5*  --  7.3*  --   --   --     < > = values in this interval not displayed. ANEMIA STUDIES  Recent Labs     09/10/22  1127   LABIRON 51   TIBC 122*   FERRITIN 346*   [id number]   FOLATE 7.7          Latest Reference Range & Units 9/10/22 11:27   Hep A IgM NONREACTIVE  NONREACTIVE   Hepatitis B Surface Ag NONREACTIVE  NONREACTIVE   Hepatitis C Ab NONREACTIVE  NONREACTIVE   Hep B Core Ab, IgM NONREACTIVE  NONREACTIVE      FINDINGS:liver us 9/10/22   LIVER:  The liver demonstrates normal echogenicity without evidence of   intrahepatic biliary ductal dilatation.   Hepatopetal flow portal vein. Liver   18.3 cm in length. BILIARY SYSTEM:  Gallstones in the gallbladder. Negative sonographic   Gilliland's sign. Mild wall thickening at 4.6 mm. No pericholecystic fluid. Common bile duct is within normal limits measuring 6.8 mm. RIGHT KIDNEY: The right kidney is grossly unremarkable without evidence of   hydronephrosis. PANCREAS:  Visualized portions of the pancreas are unremarkable. OTHER: No evidence of right upper quadrant ascites. Impression   Unremarkable ultrasound the liver. Cholelithiasis with mild gallbladder wall thickening. Negative sonographic   Gilliland's sign. No pericholecystic fluid. Common duct upper normal at 6.8 mm. No intraductal stone demonstrated. Correlation clinical findings and laboratory values required. ASSESSMENT/plan  Anemia and thrombocytopenia,  coffee ground emesis  -continue ppi  Since emesis resolved and hgb is trending up will defer egd at this time unless overt bleeding occurs  Trend hh     2.elevated lft with hx of drug abuse; us shows cholelithiasis, acute hep panel non reactive     3. MARTIN, hypernatremia mgt per nephrology     4. DKA resolved     5. AMS  oriented to person only psych following        Time spent reviewing chart assessing pt and d/w attending md around 30 minutes       This plan was formulated in collaboration with Dr. Carlie Srivastava .     Electronically signed by: LAILA Lancaster CNP on 9/12/2022 at 11:43 AM

## 2022-09-12 NOTE — PROGRESS NOTES
Department of Internal Medicine  Nephrology [facility], MD  Progress Note    Reason for consultation: Management of acute kidney injury. Consulting physician: Ira Louise MD.    Interval history:   Patient was seen and examined today  Patient is feeling better complains of mild nausea headache. Serum sodium has improved to 141 from 146 yesterday  Magnesium 1.4  Pressure stable    History of present illness: This is a 56 y.o. female with a significant past medical history of Severe bipolar disorder, type 2 diabetes mellitus [insulin requiring], cannabis and cocaine abuse, systemic hypertensionFibromyalgia and s/p prior cerebrovascular accident, who was found unresponsive at home where she lives alone. She had apparently not been taking her insulin as her refrigerator was filled with unused insulin vials. Place was activated after family had not heard from patient for several days and when EMS arrived, patient was pretty confused and obtunded with severe hypotension and BP could not be obtained initially. Blood glucose was greater than 1000 mg/dL. Blood pressure in the emergency department was 86/64 mmHg with pulse 100 and respiratory rate 24/min. Laboratory studies were remarkable for hemoconcentration, serum potassium 5.5 mmol/L, blood glucose 1036 mg/dL, serum bicarbonate 19 mmol/L [anion gap 25 mmol/L] and elevated BUN/creatinine 129/3.56 mg/dL and hence nephrology consultation. At the time of this encounter, patient remained confused and appears quite dry and delirious to occasionally lucid.     Scheduled Meds:   pantoprazole (PROTONIX) 40 mg injection  40 mg IntraVENous BID    insulin lispro  0-4 Units SubCUTAneous TID WC    insulin lispro  0-4 Units SubCUTAneous Nightly    atorvastatin  40 mg Oral Nightly    budesonide-formoterol  2 puff Inhalation BID    insulin glargine  30 Units SubCUTAneous BID    [Held by provider] metFORMIN  1,000 mg Oral BID WC    mirtazapine  7.5 mg Oral Nightly    traZODone  150 mg Oral Nightly    venlafaxine  150 mg Oral Daily with breakfast    fluticasone  1 spray Each Nostril Daily     Continuous Infusions:   sodium chloride 75 mL/hr at 22 1621    dextrose      dextrose       PRN Meds:.potassium chloride, magnesium sulfate, acetaminophen, glucose, dextrose bolus **OR** dextrose bolus, dextrose, glucagon (rDNA), dextrose, albuterol sulfate HFA, sodium phosphate IVPB **OR** sodium phosphate IVPB **OR** sodium phosphate IVPB, polyethylene glycol, haloperidol lactate    Physical Exam:    VITALS:  /80   Pulse 94   Temp 99 °F (37.2 °C)   Resp 18   Ht 5' 5\" (1.651 m)   Wt 198 lb 8 oz (90 kg)   LMP  (LMP Unknown)   SpO2 98%   PF (!) 15 L/min   BMI 33.03 kg/m²   TEMPERATURE:  Current - Temp:  (refused midnight vitals); Max - No data recorded  RESPIRATIONS RANGE: No data recorded  PULSE RANGE: Pulse  Av.5  Min: 87  Max: 94  BLOOD PRESSURE RANGE:  Systolic (13TXU), HWR:693 , Min:128 , OIR:018 ; Diastolic (61ZEK), ICO:78, Min:80, Max:83  PULSE OXIMETRY RANGE: SpO2  Av %  Min: 98 %  Max: 98 %  24HR INTAKE/OUTPUT:    Intake/Output Summary (Last 24 hours) at 2022 1814  Last data filed at 2022 1204  Gross per 24 hour   Intake --   Output 4150 ml   Net -4150 ml     Constitutional: Awake and responsive; not in respiratory distress; AxO x2    Skin: Skin color, texture, turgor normal. 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    Neuro:  Grossly normal    CBC:   Recent Labs     09/10/22  0457 09/10/22  1127 22  0446 09/11/22  1104 09/11/22 1921 09/12/22  1527   WBC 9.3  --  11.6*  --   --   --    HGB 8.6*   < > 8.1* 8.0* 8.4* 7.6*     --  116*  --   --   --     < > = values in this interval not displayed. BMP:    Recent Labs     09/10/22  0457 09/10/22  1127 09/11/22  0447 09/11/22  1104 09/11/22 1921 09/11/22  2338 09/12/22  1527   *  157*   < > 151*   < > 147* 146* 141   K 3.5*   < > 3.1*   < > 3.7 3.2* 3.3*   *   < > 121*   < > 116* 115* 111*   CO2 16*   < > 21   < > 21 22 22   BUN 61*  --  28*  --   --   --   --    CREATININE 1.47*  --  1.00*  --   --   --   --    GLUCOSE 409*  --  89  --   --   --   --     < > = values in this interval not displayed. Lab Results   Component Value Date/Time    NITRU NEGATIVE 09/08/2022 04:45 PM    COLORU Yellow 09/08/2022 04:45 PM    PHUR 5.5 09/08/2022 04:45 PM    WBCUA 6 TO 9 09/08/2022 04:45 PM    RBCUA 0 TO 2 09/08/2022 04:45 PM    MUCUS NOT REPORTED 01/24/2022 12:04 PM    TRICHOMONAS NOT REPORTED 01/24/2022 12:04 PM    YEAST FEW 08/04/2022 02:30 PM    BACTERIA FEW 08/04/2022 02:30 PM    CLARITYU clear 07/12/2021 09:57 AM    SPECGRAV 1.015 09/08/2022 04:45 PM    LEUKOCYTESUR NEGATIVE 09/08/2022 04:45 PM    UROBILINOGEN Normal 09/08/2022 04:45 PM    BILIRUBINUR NEGATIVE  Verified by ictotest. 09/08/2022 04:45 PM    BILIRUBINUR negative 07/12/2021 09:57 AM    BLOODU negative 07/12/2021 09:57 AM    GLUCOSEU 2+ 09/08/2022 04:45 PM    KETUA SMALL 09/08/2022 04:45 PM    AMORPHOUS NOT REPORTED 01/24/2022 12:04 PM     Urine Sodium:     Lab Results   Component Value Date/Time    EMILIO <20 09/08/2022 05:28 PM     Urine Protein:     Lab Results   Component Value Date/Time    TPU 13 07/08/2020 11:19 AM     Urine Creatinine:     Lab Results   Component Value Date/Time    LABCREA 121.0 09/08/2022 05:28 PM     IMPRESSION/RECOMMENDATIONS:      1.   Acute kidney injury - most consistent with prerenal azotemia secondary to dehydration from osmotic diuresis as well as ischemic acute tubular necrosis secondary to hypotension. Random urine electrolytes are however more consistent with prerenal azotemia or acute glomerulonephritis. Urine output and renal function continue to improve. 2.  Diabetic ketoacidosis - Resolved. 3.  Systemic hypertension - blood pressure is adequately controlled    4. Hypernatremia -improved   serum sodium is down to 141    5. Hypophosphatemia - secondary to insulin. Give IV potassium phosphate. 6.  Hypokalemia - likely secondary to intracellular shift with insulin. Would replace with potassium sliding scale. 7.  Hypomagnesemia [1.4 mg/dL] -replace magnesium per sliding scale    8. Thrombocytopenia -Heparin antiplatelet antibodies. Doubt thrombotic microangiopathy. Plan    Change IV fluids to normal saline  Replace electrolytes per sliding scale    Prognosis is guarded.     Chula Doe MD   Attending Nephrologist  9/12/2022 6:14 PM

## 2022-09-12 NOTE — PROGRESS NOTES
Lab went into pt's room to draw morning labs. Pt aggressively thrashing around bed and screaming at phlebotomist. RN explained to pt about the necessity for morning labs, pt continued to scream \"NO. \" RN asked if it was okay to come back in a couple hours to try again, pt agreeable. Phlebotomist will push time to 0900 for morning labs.

## 2022-09-12 NOTE — PROGRESS NOTES
Pt still refusing EEG and most care. Pt only oriented to self and becomes quite agitated once you try to explain why she needs certain things done.

## 2022-09-12 NOTE — PROGRESS NOTES
Pt is refusing for staff to take her vitals. RN went into room, to explain why getting vitals are very important but pt continued to refuse. RN will attempt at a later time.

## 2022-09-12 NOTE — PROGRESS NOTES
Pt very agitated and refusing blood work from being drawn. RN able to calm patient but she was still refusing blood work. RN will notify residents and see if we can get a line draw order to utilize her TLC.

## 2022-09-12 NOTE — PROGRESS NOTES
[address] [facility]    PROGRESS NOTE             9/12/2022    9:02 AM    Name:   Pan Hill  MRN:     [id number]     Acct:      [de-identified]   Room:   2112/2112-01   Day:  4  Admit Date:  9/8/2022 11:01 AM    PCP:  Mago Collins MD  Code Status:  Full Code    Subjective:     C/C:   Chief Complaint   Patient presents with    Hyperglycemia    Altered Mental Status     Interval History Status: improved. Patient was seen and examined at bedside. Vitals are stable, she is still confused and disoriented. BG this morning is 81. She was agitated this morning, refused blood work, blood for morning BMP was not drawn. Brief History:     The patient is a 56 y.o. Non- / non  female with a history of asthma, bipolar 1 disorder, hypertension, stroke, polysubstance abuse, DM 2, degenerative disc disease, and depression with psychotic features, who presented to the ED with altered mental status. The patient was found unresponsive at her home where she lives alone. The patient had not been taking her insulin as her refrigerator was filled with unused insulin vials. The patients family had not heard from the patient for several days and EMS was called. The patient was found confused and obtunded, hypotensive, and hyperglycemic. The ED, the patient was hypotensive (BP 86/64) and tachypneic (RR 24). Blood glucose was 1,034. Serum potassium was 5.5, bicarbonate was 19 mmol/L [anion gap 25 mmol/L). Cr was 3.56 (baseline Cr 0.73). Levophed was administered. The patient was admitted for management of DKA and DKA protocol was initiated    Review of Systems:     Review of Systems   Reason unable to perform ROS: Pt is uncooperative. Medications: Allergies:     Allergies   Allergen Reactions    Bactrim [Sulfamethoxazole-Trimethoprim] Swelling    Adhesive Tape Rash       Current Meds:   Scheduled Meds:    pantoprazole (PROTONIX) 40 mg injection  40 mg IntraVENous BID    insulin lispro  0-4 Units SubCUTAneous TID     insulin lispro  0-4 Units SubCUTAneous Nightly    atorvastatin  40 mg Oral Nightly    budesonide-formoterol  2 puff Inhalation BID    insulin glargine  30 Units SubCUTAneous BID    [Held by provider] metFORMIN  1,000 mg Oral BID WC    mirtazapine  7.5 mg Oral Nightly    traZODone  150 mg Oral Nightly    venlafaxine  150 mg Oral Daily with breakfast    fluticasone  1 spray Each Nostril Daily     Continuous Infusions:    sodium chloride 100 mL/hr at 09/11/22 0736    dextrose      dextrose       PRN Meds: potassium chloride, magnesium sulfate, acetaminophen, glucose, dextrose bolus **OR** dextrose bolus, dextrose, glucagon (rDNA), dextrose, albuterol sulfate HFA, sodium phosphate IVPB **OR** sodium phosphate IVPB **OR** sodium phosphate IVPB, polyethylene glycol, haloperidol lactate    Data:     Past Medical History:   has a past medical history of Acid reflux, Acute cystitis with hematuria, Acute non-recurrent maxillary sinusitis, Asthma, Bipolar 1 disorder ([address] Bipolar disorder, mixed ([address] BMI 34.0-34.9,adult, Cannabis use disorder, severe, dependence ([address] Cerebrovascular accident (CVA) ([address] Chest pain, Chronic renal insufficiency, Cocaine abuse ([address] COVID-19 virus RNA test result unknown, DDD (degenerative disc disease), cervical, Diabetes mellitus ([address] Dizziness, Fibromyalgia, History of stroke, Homicidal ideation, Hyperglycemia, Hypertension, Hypotension, IDDM (insulin dependent diabetes mellitus), Lupus ([address] Migraine, Neuropathy, Neuropathy, Polysubstance abuse ([address] Post traumatic stress disorder (PTSD), Posttraumatic stress disorder, Recurrent depression ([address] Recurrent major depressive disorder, in partial remission ([address] Screening mammogram, encounter for, Severe recurrent major depression with psychotic features ([address] Severe recurrent major depression without psychotic features ([address] Stroke (cerebrum) [address] [address] Stroke ([facility] [address] Suicidal ideation, Suicidal intent, Vitamin D deficiency, and White matter changes. Social History:   reports that she has never smoked. She has never used smokeless tobacco. She reports that she does not currently use alcohol. She reports current drug use. Drugs: Marijuana (Weed) and Cocaine. Family History:   Family History   Problem Relation Age of Onset    Diabetes Mother     Stroke Mother     Diabetes Father     Diabetes Sister     Diabetes Brother     Other Son         GSW    No Known Problems Sister        Vitals:  /83   Pulse 87   Temp 99 °F (37.2 °C)   Resp 18   Ht 5' 5\" (1.651 m)   Wt 198 lb 8 oz (90 kg)   LMP  (LMP Unknown)   SpO2 98%   PF (!) 15 L/min   BMI 33.03 kg/m²   Temp (24hrs), Av °F (37.2 °C), Min:99 °F (37.2 °C), Max:99 °F (37.2 °C)    Recent Labs     22  0740 22  1227 22  0744   POCGLU 80 111* 172* 81       I/O(24Hr):     Intake/Output Summary (Last 24 hours) at 2022 09  Last data filed at 2022 0737  Gross per 24 hour   Intake 1600 ml   Output 3650 ml   Net -2050 ml       Labs:    [unfilled]    Lab Results   Component Value Date/Time    SPECIAL NOT REPORTED 2022 12:47 PM     Lab Results   Component Value Date/Time    CULTURE NO GROWTH 5 DAYS 2022 12:18 PM       [unfilled]    Radiology:    ECHO Complete 2D W Doppler W Color    Result Date: 9/10/2022  [address] [facility] Transthoracic Echocardiography Report (TTE)  Patient Name Jackqueline Spatz    Date of Study               2022               [facility]   Date of      1965  Gender                      Female  Birth   Age          56 year(s)  Race                        Black   Room Number          Height:                     65 inch, 165.1 cm   Corporate ID [id number]    Weight:                     180 pounds, 81.6 kg  #   Patient Acct [de-identified]   BSA:          1.89 m^2      BMI:      29.95  # kg/m^2   MR #         [id number]      Sonographer                 Carina Newsome   Accession #  [id number]  Interpreting Physician      [address]   Fellow                   Referring Nurse                           Practitioner   Interpreting             Referring Physician  Fellow  Type of Study   TTE procedure:2D Echocardiogram, M-Mode, Doppler, Color Doppler. Procedure Date Date: 09/09/2022 Start: 12:50 PM Study Location: [facility] Technical Quality: Fair visualization Indications:Tachycardia. Patient Status: Inpatient Height: 65 inches Weight: 180 pounds BSA: 1.89 m^2 BMI: 29.95 kg/m^2 Rhythm: Sinus tachycardia HR: 108 bpm BP: 98/55 mmHg CONCLUSIONS Summary Left ventricle is normal in size and wall thickness. Global left ventricular systolic function is normal. Estimated LV EF 60-65 %. No obvious wall motion abnormality seen. Left atrium is normal in size. No significant valvular regurgitation or stenosis seen. Signature ----------------------------------------------------------------------------  Electronically signed by Carina Newsome(Sonographer) on 09/09/2022 02:20  PM ---------------------------------------------------------------------------- ----------------------------------------------------------------------------  Electronically signed by Suleiman Ponce(Interpreting physician) on 09/10/2022  08:12 AM ---------------------------------------------------------------------------- FINDINGS Left Atrium Left atrium is normal in size. Left Ventricle Left ventricle is normal in size and wall thickness. Global left ventricular systolic function is normal. Estimated LV EF 60-65 %. No obvious wall motion abnormality seen. Right Atrium Right atrium is normal in size. Right Ventricle Normal right ventricular size and function. Mitral Valve No obvious valvular abnormality seen. No evidence of mitral regurgitation. Aortic Valve No obvious valvular abnormality seen.  No evidence of aortic insufficiency or stenosis. Tricuspid Valve Tricuspid valve was not well visualized. Insignificant tricuspid regurgitation, unable to estimate RVSP. Pulmonic Valve Pulmonic valve was not well visualized. No evidence of pulmonic insufficiency or stenosis. Pericardial Effusion No significant pericardial effusion is seen. Pleural Effusion No pleural effusion seen. Miscellaneous Normal aortic root dimension. IVC not well visualized. E/E' average = 8.5. M-mode / 2D Measurements & Calculations:   LVIDd:3.79 cm(3.7 - 5.6 cm)      Diastolic NZHWWT:21.0 ml  RENPW:0.57 cm(2.2 - 4.0 cm)      Systolic GMFIYT:0.15 ml  EQFO:0.17 cm(0.6 - 1.1 cm)       Aortic Root:3.6 cm(2.0 - 3.7 cm)  LVPWd:0.7 cm(0.6 - 1.1 cm)       LA Dimension: 2.3 cm(1.9 - 4.0 cm)  Fractional Shortenin.55 %    LA volume/Index: 33.7 ml /18m^2  Calculated LVEF (%): 86.38 %     LVOT:1.7 cm   Mitral:                              Aortic   Peak E-Wave: 0.75 m/s                Peak Velocity: 1.51 m/s  Peak A-Wave: 1.15 m/s                Mean Velocity: 0.86 m/s  E/A Ratio: 0.65                      Peak Gradient: 9.12 mmHg  Peak Gradient: 2.26 mmHg             Mean Gradient: 4 mmHg  Deceleration Time: 130 msec                                        Area (continuity): 2.26 cm^2                                       AV VTI: 20.6 cm  Septal Wall E' velocity:0.07 m/s Lateral Wall E' velocity:0.12 m/s    XR ABDOMEN (KUB) (SINGLE AP VIEW)    Result Date: 2022  EXAMINATION: ONE SUPINE XRAY VIEW(S) OF THE ABDOMEN 2022 9:33 am COMPARISON: 2022, 2022 HISTORY: ORDERING SYSTEM PROVIDED HISTORY: NGT placement, dark liquid coming through NG TECHNOLOGIST PROVIDED HISTORY: NGT placement, dark liquid coming through NG Reason for Exam: NGT placement, dark liquid coming through NG FINDINGS: Enteric tube coiled in the body of the stomach. Nonobstructive bowel gas pattern. Mild stool burden. Multiple external devices project over the patient. No acute osseous abnormality identified. Enteric tube coiled at the level of the body of the stomach. Nonobstructive bowel gas pattern. CT HEAD WO CONTRAST    Result Date: 9/8/2022  EXAMINATION: CT OF THE HEAD WITHOUT CONTRAST  9/8/2022 11:38 am TECHNIQUE: CT of the head was performed without the administration of intravenous contrast. Automated exposure control, iterative reconstruction, and/or weight based adjustment of the mA/kV was utilized to reduce the radiation dose to as low as reasonably achievable. COMPARISON: MRI 08/04/2022, 08/03/2022 and CT 08/03/2022. HISTORY: ORDERING SYSTEM PROVIDED HISTORY: Mental Status Changes TECHNOLOGIST PROVIDED HISTORY: Mental Status Changes Decision Support Exception - unselect if not a suspected or confirmed emergency medical condition->Emergency Medical Condition (MA) Is the patient pregnant?->No Reason for Exam: AMS Additional signs and symptoms: AMS FINDINGS: BRAIN/VENTRICLES: There is no acute intracranial hemorrhage, mass effect or midline shift. No abnormal extra-axial fluid collection. Encephalomalacia in the left frontal lobe demonstrated corresponding to recently demonstrated ischemia. Cortical atrophy and chronic white matter changes in the brain and associated ventricular enlargement are again demonstrated. ORBITS: The visualized portion of the orbits demonstrate no acute abnormality. SINUSES: The visualized paranasal sinuses and mastoid air cells demonstrate no acute abnormality. SOFT TISSUES/SKULL:  No acute abnormality of the visualized skull or soft tissues. Chronic findings in the brain without acute CT abnormality identified. Expected evolution of recently demonstrated ischemia in the left frontal lobe. US LIVER    Result Date: 9/10/2022  EXAMINATION: RIGHT UPPER QUADRANT ULTRASOUND 9/10/2022 11:21 am COMPARISON: None.  HISTORY: ORDERING SYSTEM PROVIDED HISTORY: elevated lft TECHNOLOGIST PROVIDED HISTORY: elevated lft FINDINGS: LIVER: The liver demonstrates normal echogenicity without evidence of intrahepatic biliary ductal dilatation. Hepatopetal flow portal vein. Liver 18.3 cm in length. BILIARY SYSTEM:  Gallstones in the gallbladder. Negative sonographic Gilliland's sign. Mild wall thickening at 4.6 mm. No pericholecystic fluid. Common bile duct is within normal limits measuring 6.8 mm. RIGHT KIDNEY: The right kidney is grossly unremarkable without evidence of hydronephrosis. PANCREAS:  Visualized portions of the pancreas are unremarkable. OTHER: No evidence of right upper quadrant ascites. Unremarkable ultrasound the liver. Cholelithiasis with mild gallbladder wall thickening. Negative sonographic Gilliland's sign. No pericholecystic fluid. Common duct upper normal at 6.8 mm. No intraductal stone demonstrated. Correlation clinical findings and laboratory values required. XR CHEST PORTABLE    Result Date: 9/8/2022  EXAMINATION: ONE XRAY VIEW OF THE CHEST 9/8/2022 7:53 pm COMPARISON: 08/17/2022 HISTORY: ORDERING SYSTEM PROVIDED HISTORY: line placement TECHNOLOGIST PROVIDED HISTORY: line placement Reason for Exam: Line placement FINDINGS: Central venous catheter tip overlies the right atrium. No pleural effusion or pneumothorax. Heart size is at the upper limits of normal.     Right central venous catheter tip overlies the right atrium. No pneumothorax. XR CHEST PORTABLE    Result Date: 8/17/2022  EXAMINATION: ONE XRAY VIEW OF THE CHEST 8/17/2022 8:09 pm COMPARISON: None. HISTORY: ORDERING SYSTEM PROVIDED HISTORY: chest pain TECHNOLOGIST PROVIDED HISTORY: chest pain FINDINGS: Chest radiograph: The cardiomediastinal silhouette and hilar contours are normal. The lungs are clear with no focal consolidation, pleural effusion or pneumothorax. The overlying soft tissue and osseous structures do not demonstrate acute abnormality. No acute intrathoracic pathology.      US RETROPERITONEAL COMPLETE    Result Date: 9/8/2022  EXAMINATION: RETROPERITONEAL ULTRASOUND OF THE KIDNEYS AND URINARY BLADDER 9/8/2022 COMPARISON: None HISTORY: ORDERING SYSTEM PROVIDED HISTORY: Acute kidney injury TECHNOLOGIST PROVIDED HISTORY: Acute kidney injury 53-year-old female with acute kidney injury FINDINGS: Kidneys: Right kidney measures 10.4 x 4.3 x 5.0 cm. Right renal cortical thickness measures 1.7 cm. Left kidney measures 9.4 x 4.8 x 4.8 cm. Left renal cortical thickness measures 1.8 cm. No hydronephrosis or perinephric fluid. No echogenic foci with posterior acoustic shadowing to suggest renal calculi. Gross preservation of the bilateral corticomedullary differentiation. Bladder: Not imaged. Unremarkable renal ultrasound. No hydronephrosis. Physical Examination:        Constitutional:       Appearance: She is ill-appearing. HENT:      Head: Normocephalic and atraumatic. Nose: No congestion or rhinorrhea. Eyes:      Extraocular Movements: Extraocular movements intact. Conjunctiva/sclera: Conjunctivae normal.      Pupils: Pupils are equal, round, and reactive to light. Cardiovascular:      Rate and Rhythm: Tachycardia present. Pulses: Normal pulses. Heart sounds: Normal heart sounds. No murmur heard. No friction rub. No gallop. Pulmonary:      Effort: Pulmonary effort is normal. No respiratory distress. Breath sounds: Normal breath sounds. No wheezing or rales. Abdominal:      General: Abdomen is flat. Bowel sounds are normal. There is no distension. Palpations: There is no mass. Tenderness: There is no guarding. Musculoskeletal:      Right lower leg: No edema. Left lower leg: No edema. Skin:     Capillary Refill: Capillary refill takes less than 2 seconds. Neurological:      General: No focal deficit present. Mental Status: She is alert but confused and disoriented. Sensory: No sensory deficit. Motor: No weakness.    Psychiatric: was not accessed due to altered mental status      Assessment:        Primary Problem  DKA, type 1, not at goal [facility])    Active Hospital Problems    Diagnosis Date Noted    Coffee ground emesis [K92.0] 09/11/2022     Priority: Medium    Elevated LFTs [R79.89] 09/11/2022     Priority: Medium    Unspecified mood (affective) disorder ([facility] [address] [F39] 09/10/2022     Priority: Medium    Uremic encephalopathy [G93.49, N19] 09/09/2022     Priority: Medium    DKA, type 1, not at goal [facility]) [E10.10] 09/08/2022     Priority: Medium    DKA, type 2, not at goal [facility]) [E11.10] 09/08/2022     Priority: Medium    Cocaine abuse ([facility] [F14.10] 01/05/2018       Plan:        DKA, 2/2 poor insulin compliance, resolving  - Glucose on admssion: 1,034; glucose today 80  - BMP q4h, initial Mag and Phos levels  - Glucose checks 4 times daily  - Home Lantus 30 units BID   - Home metformin on hold  -IV Normal Saline at 250 mL/hr  -Hypoglycemia, phos and potassium replacement protocols in place   -General surgery placed central line      Acute Kidney Injury pre-renal azotemia secondary to dehydration  -Baseline Cr 0.73;  Cr on admission: 3.56; Cr today 1.00  -Change IV NS infusion to 0.45 NS at 150 ml/hr  -BMP q4 hours  -monitor urine output  -Nephrology consulted      Suspicion for upper GI bleeding  -Coffee-ground emesis from his NG tube, eventually become bilious  -Globin had been steadily climbing since admission, currently is stable around 8  -GI on board  -PPI started  -Upper endoscopy is planned once electrolyte abnormalities resolve     Hypotension secondary to dehydration by osmotic diuresis  -BP today:113/61  -Free water deficit 10 L  -Continue NS infusion     Hypernatremia  -most recent Na+ : 147;   -Continue IV NS infusion to 0.45 NS at 150 ml/hr      Acute metabolic encephalopathy s/s to electrolyte abnormality  -Restraints in place; patient oriented to person only  -Nephrology on board; electrolyte replacement  -EEG ordered    -Restraints placed Polysubstance abuse  -Urine tox positive for Fentanyl and cocaine  -Psychiatry consulted      Ventricular tachycardia  -Non-sustained VT secondary to electrolye imbalance, metabolic derangement and cocaine  -Continue to manage hyperglycemia and electrolytes  -Discontinue metoprolol  -Echo: Estimated LV EF 60-65       Depression with psychotic features  -Continue Haloperidol lactate 5 mg IM q 6 hours PRN  -Continue Mirtazapine 7.5 mg po nightly  -Continue Trazodone 150 mg po nightly  -Continue Venlafaxine 150 mg po daily  -Psychiatry consulted     Asthma  -Continue home Proventil ventolin 2 puff q 4 hours as needed   -Continue home Symbicort 2 puffs BID  -Continue home Flonase 1 spray each nostril daily      Code: Full code  Diet: Adult diet, 3 carbs  DVT prophylaxis: on hold    Joshua Chand MD  9/12/2022  9:02 AM      Attending Physician Statement  I have discussed the care of [address] [place] [address] and I have examined the patient myselft and taken ros and hpi , including pertinent history and exam findings,  with the resident. I have reviewed the key elements of all parts of the encounter with the resident. I agree with the assessment, plan and orders as documented by the resident.   Patient is alert oriented to place uncooperative probably lack of insight  Psychiatry input  Hyponatremia is improving hypokalemia replace  Patient will need discharge planning to extended-care facility    Electronically signed by Juan Ramon Blue MD

## 2022-09-12 NOTE — PLAN OF CARE
Problem: Discharge Planning  Goal: Discharge to home or other facility with appropriate resources  9/12/2022 1753 by Mayco Kern RN  Outcome: Progressing  9/12/2022 0453 by Molly Parmar RN  Outcome: Progressing     Problem: Pain  Goal: Verbalizes/displays adequate comfort level or baseline comfort level  9/12/2022 1753 by Mayco Kern RN  Outcome: Progressing  9/12/2022 0453 by Molly Parmar RN  Outcome: Progressing     Problem: Skin/Tissue Integrity  Goal: Absence of new skin breakdown  Description: 1. Monitor for areas of redness and/or skin breakdown  2. Assess vascular access sites hourly  3. Every 4-6 hours minimum:  Change oxygen saturation probe site  4. Every 4-6 hours:  If on nasal continuous positive airway pressure, respiratory therapy assess nares and determine need for appliance change or resting period.   9/12/2022 1753 by Mayco Kern RN  Outcome: Progressing  9/12/2022 0453 by Molly Parmar RN  Outcome: Progressing     Problem: ABCDS Injury Assessment  Goal: Absence of physical injury  9/12/2022 1753 by Mayco Kern RN  Outcome: Progressing  9/12/2022 0453 by Molly Parmar RN  Outcome: Progressing     Problem: Safety - Adult  Goal: Free from fall injury  9/12/2022 1753 by Mayco Kern RN  Outcome: Progressing  9/12/2022 0453 by Molly Parmar RN  Outcome: Progressing     Problem: Neurosensory - Adult  Goal: Achieves stable or improved neurological status  9/12/2022 1753 by Mayco Kern RN  Outcome: Progressing  9/12/2022 0453 by Molly Parmar RN  Outcome: Progressing  Goal: Absence of seizures  9/12/2022 1753 by Mayco Kern RN  Outcome: Progressing  9/12/2022 0453 by Molly Parmar RN  Outcome: Progressing  Goal: Remains free of injury related to seizures activity  9/12/2022 1753 by Mayco Kern RN  Outcome: Progressing  9/12/2022 0453 by Molly Parmar RN  Outcome: Progressing  Goal: Achieves maximal functionality and self care  9/12/2022 1753 by Will Taylor RN  Outcome: Progressing  9/12/2022 0453 by Nilo Gallardo RN  Outcome: Progressing     Problem: Cardiovascular - Adult  Goal: Maintains optimal cardiac output and hemodynamic stability  9/12/2022 1753 by Will Tayolr RN  Outcome: Progressing  Flowsheets (Taken 9/12/2022 0800)  Maintains optimal cardiac output and hemodynamic stability:   Monitor blood pressure and heart rate   Monitor urine output and notify Licensed Independent Practitioner for values outside of normal range  9/12/2022 0453 by Nilo Gallardo RN  Outcome: Progressing  Goal: Absence of cardiac dysrhythmias or at baseline  9/12/2022 1753 by Will Taylor RN  Outcome: Progressing  Flowsheets (Taken 9/12/2022 0800)  Absence of cardiac dysrhythmias or at baseline:   Monitor cardiac rate and rhythm   Assess for signs of decreased cardiac output   Administer antiarrhythmia medication and electrolyte replacement as ordered  9/12/2022 0453 by Nilo Gallardo RN  Outcome: Progressing     Problem: Skin/Tissue Integrity - Adult  Goal: Skin integrity remains intact  9/12/2022 1753 by Will Taylor RN  Outcome: Progressing  Flowsheets (Taken 9/12/2022 0800)  Skin Integrity Remains Intact: Monitor for areas of redness and/or skin breakdown  9/12/2022 0453 by Nilo Gallardo RN  Outcome: Progressing  Goal: Incisions, wounds, or drain sites healing without S/S of infection  9/12/2022 1753 by Will Taylor RN  Outcome: Progressing  Flowsheets (Taken 9/12/2022 0800)  Incisions, Wounds, or Drain Sites Healing Without Sign and Symptoms of Infection: TWICE DAILY: Assess and document skin integrity  9/12/2022 0453 by Nilo Gallardo RN  Outcome: Progressing  Goal: Oral mucous membranes remain intact  9/12/2022 1753 by Will Taylor RN  Outcome: Progressing  9/12/2022 0453 by Nilo Gallardo RN  Outcome: Progressing     Problem: Musculoskeletal - Adult  Goal: Return mobility to safest level of function  9/12/2022 1753 by David Heaton RN  Outcome: Progressing  9/12/2022 0453 by Fareed Tyler RN  Outcome: Progressing  Goal: Return ADL status to a safe level of function  9/12/2022 1753 by David Heaton RN  Outcome: Progressing  9/12/2022 0453 by Fareed Tyler RN  Outcome: Progressing     Problem: Gastrointestinal - Adult  Goal: Minimal or absence of nausea and vomiting  9/12/2022 1753 by David Heaton RN  Outcome: Progressing  Flowsheets (Taken 9/12/2022 0800)  Minimal or absence of nausea and vomiting: Administer IV fluids as ordered to ensure adequate hydration  9/12/2022 0453 by Fareed Tyler RN  Outcome: Progressing  Goal: Maintains or returns to baseline bowel function  9/12/2022 1753 by David Heaton RN  Outcome: Progressing  Flowsheets (Taken 9/12/2022 0800)  Maintains or returns to baseline bowel function: Assess bowel function  9/12/2022 0453 by Fareed Tyler RN  Outcome: Progressing  Goal: Maintains adequate nutritional intake  9/12/2022 1753 by David Heaton RN  Outcome: Progressing  Flowsheets (Taken 9/12/2022 0800)  Maintains adequate nutritional intake: Monitor percentage of each meal consumed  9/12/2022 0453 by Fareed Tyler RN  Outcome: Progressing     Problem: Genitourinary - Adult  Goal: Absence of urinary retention  9/12/2022 1753 by David Heaton RN  Outcome: Progressing  9/12/2022 0453 by Fareed Tyler RN  Outcome: Progressing  Goal: Urinary catheter remains patent  9/12/2022 1753 by David Heaton RN  Outcome: Progressing  9/12/2022 0453 by Fareed Tyler RN  Outcome: Progressing     Problem: Infection - Adult  Goal: Absence of infection at discharge  9/12/2022 1753 by David Heaton RN  Outcome: Progressing  9/12/2022 0453 by Fareed Tyler RN  Outcome: Progressing  Goal: Absence of infection during hospitalization  9/12/2022 1753 by Elgin Molina Clara Espinal RN  Outcome: Progressing  9/12/2022 0453 by Joanne Cross RN  Outcome: Progressing     Problem: Metabolic/Fluid and Electrolytes - Adult  Goal: Electrolytes maintained within normal limits  9/12/2022 1753 by Henry Akbar RN  Outcome: Progressing  9/12/2022 0453 by Joanne Cross RN  Outcome: Progressing  Goal: Hemodynamic stability and optimal renal function maintained  9/12/2022 1753 by Henry Akbar RN  Outcome: Progressing  9/12/2022 0453 by Joanne Cross RN  Outcome: Progressing  Goal: Glucose maintained within prescribed range  9/12/2022 1753 by Henry Akbar RN  Outcome: Progressing  9/12/2022 0453 by Joanne Cross RN  Outcome: Progressing     Problem: Hematologic - Adult  Goal: Maintains hematologic stability  9/12/2022 1753 by Henry Akbar RN  Outcome: Progressing  9/12/2022 0453 by Joanne Cross RN  Outcome: Progressing     Problem: Safety - Medical Restraint  Goal: Remains free of injury from restraints (Restraint for Interference with Medical Device)  Description: INTERVENTIONS:  1. Determine that other, less restrictive measures have been tried or would not be effective before applying the restraint  2. Evaluate the patient's condition at the time of restraint application  3. Inform patient/family regarding the reason for restraint  4.  Q2H: Monitor safety, psychosocial status, comfort, nutrition and hydration  9/12/2022 1753 by Henry Akbar RN  Outcome: Progressing  9/12/2022 0453 by Joanne Cross RN  Outcome: Progressing  Flowsheets  Taken 9/11/2022 1800 by Lars Poon RN  Remains free of injury from restraints (restraint for interference with medical device): Determine that other, less restrictive measures have been tried or would not be effective before applying the restraint  Taken 9/11/2022 1700 by Lars Poon RN  Remains free of injury from restraints (restraint for interference with medical device): Determine that other, less restrictive measures have been tried or would not be effective before applying the restraint  Taken 9/11/2022 1551 by Vineet Poon RN  Remains free of injury from restraints (restraint for interference with medical device): Determine that other, less restrictive measures have been tried or would not be effective before applying the restraint     Problem: Chronic Conditions and Co-morbidities  Goal: Patient's chronic conditions and co-morbidity symptoms are monitored and maintained or improved  9/12/2022 1753 by Bandar Pettit RN  Outcome: Progressing  9/12/2022 0453 by Abby Howell RN  Outcome: Progressing

## 2022-09-12 NOTE — PLAN OF CARE
Problem: Discharge Planning  Goal: Discharge to home or other facility with appropriate resources  Outcome: Progressing     Problem: Pain  Goal: Verbalizes/displays adequate comfort level or baseline comfort level  Outcome: Progressing     Problem: Skin/Tissue Integrity  Goal: Absence of new skin breakdown  Description: 1. Monitor for areas of redness and/or skin breakdown  2. Assess vascular access sites hourly  3. Every 4-6 hours minimum:  Change oxygen saturation probe site  4. Every 4-6 hours:  If on nasal continuous positive airway pressure, respiratory therapy assess nares and determine need for appliance change or resting period.   Outcome: Progressing     Problem: ABCDS Injury Assessment  Goal: Absence of physical injury  Outcome: Progressing     Problem: Safety - Adult  Goal: Free from fall injury  Outcome: Progressing     Problem: Neurosensory - Adult  Goal: Achieves stable or improved neurological status  Outcome: Progressing     Problem: Neurosensory - Adult  Goal: Absence of seizures  Outcome: Progressing     Problem: Neurosensory - Adult  Goal: Remains free of injury related to seizures activity  Outcome: Progressing     Problem: Neurosensory - Adult  Goal: Achieves maximal functionality and self care  Outcome: Progressing     Problem: Cardiovascular - Adult  Goal: Maintains optimal cardiac output and hemodynamic stability  Outcome: Progressing     Problem: Cardiovascular - Adult  Goal: Absence of cardiac dysrhythmias or at baseline  Outcome: Progressing     Problem: Skin/Tissue Integrity - Adult  Goal: Skin integrity remains intact  Outcome: Progressing     Problem: Skin/Tissue Integrity - Adult  Goal: Incisions, wounds, or drain sites healing without S/S of infection  Outcome: Progressing     Problem: Skin/Tissue Integrity - Adult  Goal: Oral mucous membranes remain intact  Outcome: Progressing     Problem: Musculoskeletal - Adult  Goal: Return mobility to safest level of function  Outcome: Progressing     Problem: Musculoskeletal - Adult  Goal: Return ADL status to a safe level of function  Outcome: Progressing     Problem: Gastrointestinal - Adult  Goal: Minimal or absence of nausea and vomiting  Outcome: Progressing     Problem: Gastrointestinal - Adult  Goal: Maintains or returns to baseline bowel function  Outcome: Progressing     Problem: Gastrointestinal - Adult  Goal: Maintains adequate nutritional intake  Outcome: Progressing     Problem: Genitourinary - Adult  Goal: Absence of urinary retention  Outcome: Progressing     Problem: Genitourinary - Adult  Goal: Urinary catheter remains patent  Outcome: Progressing     Problem: Infection - Adult  Goal: Absence of infection at discharge  Outcome: Progressing     Problem: Infection - Adult  Goal: Absence of infection during hospitalization  Outcome: Progressing     Problem: Metabolic/Fluid and Electrolytes - Adult  Goal: Electrolytes maintained within normal limits  Outcome: Progressing     Problem: Metabolic/Fluid and Electrolytes - Adult  Goal: Hemodynamic stability and optimal renal function maintained  Outcome: Progressing     Problem: Metabolic/Fluid and Electrolytes - Adult  Goal: Glucose maintained within prescribed range  Outcome: Progressing     Problem: Hematologic - Adult  Goal: Maintains hematologic stability  Outcome: Progressing     Problem: Safety - Medical Restraint  Goal: Remains free of injury from restraints (Restraint for Interference with Medical Device)  Description: INTERVENTIONS:  1. Determine that other, less restrictive measures have been tried or would not be effective before applying the restraint  2. Evaluate the patient's condition at the time of restraint application  3. Inform patient/family regarding the reason for restraint  4.  Q2H: Monitor safety, psychosocial status, comfort, nutrition and hydration  Outcome: Progressing  Flowsheets  Taken 9/11/2022 1800 by Ashlyn Poon RN  Remains free of injury from restraints (restraint for interference with medical device): Determine that other, less restrictive measures have been tried or would not be effective before applying the restraint  Taken 9/11/2022 1700 by Mamie Bernheim Fought, RN  Remains free of injury from restraints (restraint for interference with medical device): Determine that other, less restrictive measures have been tried or would not be effective before applying the restraint  Taken 9/11/2022 1551 by Mamie Bernheim Fought, RN  Remains free of injury from restraints (restraint for interference with medical device): Determine that other, less restrictive measures have been tried or would not be effective before applying the restraint     Problem: Chronic Conditions and Co-morbidities  Goal: Patient's chronic conditions and co-morbidity symptoms are monitored and maintained or improved  Outcome: Progressing

## 2022-09-13 ENCOUNTER — TELEPHONE (OUTPATIENT)
Dept: FAMILY MEDICINE CLINIC | Age: 57
End: 2022-09-13

## 2022-09-13 PROBLEM — E10.11 DIABETIC KETOACIDOSIS WITH COMA ASSOCIATED WITH TYPE 1 DIABETES MELLITUS (HCC): Status: ACTIVE | Noted: 2022-09-13

## 2022-09-13 LAB
ANION GAP SERPL CALCULATED.3IONS-SCNC: 7 MMOL/L (ref 9–17)
ANION GAP SERPL CALCULATED.3IONS-SCNC: 7 MMOL/L (ref 9–17)
ANION GAP SERPL CALCULATED.3IONS-SCNC: 9 MMOL/L (ref 9–17)
ANTI JO-1 IGG: <0.4 U/ML
ANTI SSA: 8.5 U/ML
ANTI SSB: <0.3 U/ML
ANTI-CENTROMERE: <0.4 U/ML
ANTI-RNP70: 0.4 U/ML
ANTI-SCLERODERMA: <0.6 U/ML
ANTI-SMITH: 2.1 U/ML
ANTI-U1RNP: 0.9 U/ML
CHLORIDE BLD-SCNC: 109 MMOL/L (ref 98–107)
CHLORIDE BLD-SCNC: 111 MMOL/L (ref 98–107)
CHLORIDE BLD-SCNC: 111 MMOL/L (ref 98–107)
CO2: 21 MMOL/L (ref 20–31)
CO2: 23 MMOL/L (ref 20–31)
CO2: 24 MMOL/L (ref 20–31)
GLUCOSE BLD-MCNC: 122 MG/DL (ref 65–105)
GLUCOSE BLD-MCNC: 171 MG/DL (ref 65–105)
GLUCOSE BLD-MCNC: 174 MG/DL (ref 65–105)
GLUCOSE BLD-MCNC: 180 MG/DL (ref 65–105)
GLUCOSE BLD-MCNC: 69 MG/DL (ref 65–105)
HCT VFR BLD CALC: 22.1 % (ref 36–46)
HEMOGLOBIN: 7.1 G/DL (ref 12–16)
MAGNESIUM: 1.7 MG/DL (ref 1.6–2.6)
MAGNESIUM: 1.7 MG/DL (ref 1.6–2.6)
MAGNESIUM: 1.8 MG/DL (ref 1.6–2.6)
PHOSPHORUS: 2.4 MG/DL (ref 2.6–4.5)
PHOSPHORUS: 2.4 MG/DL (ref 2.6–4.5)
PHOSPHORUS: 3 MG/DL (ref 2.6–4.5)
POTASSIUM SERPL-SCNC: 3.5 MMOL/L (ref 3.7–5.3)
POTASSIUM SERPL-SCNC: 4 MMOL/L (ref 3.7–5.3)
POTASSIUM SERPL-SCNC: 4.1 MMOL/L (ref 3.7–5.3)
SODIUM BLD-SCNC: 139 MMOL/L (ref 135–144)
SODIUM BLD-SCNC: 141 MMOL/L (ref 135–144)
SODIUM BLD-SCNC: 142 MMOL/L (ref 135–144)

## 2022-09-13 PROCEDURE — 2580000003 HC RX 258: Performed by: STUDENT IN AN ORGANIZED HEALTH CARE EDUCATION/TRAINING PROGRAM

## 2022-09-13 PROCEDURE — 6370000000 HC RX 637 (ALT 250 FOR IP): Performed by: NURSE PRACTITIONER

## 2022-09-13 PROCEDURE — 84100 ASSAY OF PHOSPHORUS: CPT

## 2022-09-13 PROCEDURE — A4216 STERILE WATER/SALINE, 10 ML: HCPCS | Performed by: STUDENT IN AN ORGANIZED HEALTH CARE EDUCATION/TRAINING PROGRAM

## 2022-09-13 PROCEDURE — 6370000000 HC RX 637 (ALT 250 FOR IP): Performed by: STUDENT IN AN ORGANIZED HEALTH CARE EDUCATION/TRAINING PROGRAM

## 2022-09-13 PROCEDURE — APPSS30 APP SPLIT SHARED TIME 16-30 MINUTES: Performed by: NURSE PRACTITIONER

## 2022-09-13 PROCEDURE — 85018 HEMOGLOBIN: CPT

## 2022-09-13 PROCEDURE — 6370000000 HC RX 637 (ALT 250 FOR IP)

## 2022-09-13 PROCEDURE — 80051 ELECTROLYTE PANEL: CPT

## 2022-09-13 PROCEDURE — C9113 INJ PANTOPRAZOLE SODIUM, VIA: HCPCS | Performed by: STUDENT IN AN ORGANIZED HEALTH CARE EDUCATION/TRAINING PROGRAM

## 2022-09-13 PROCEDURE — 83735 ASSAY OF MAGNESIUM: CPT

## 2022-09-13 PROCEDURE — 99232 SBSQ HOSP IP/OBS MODERATE 35: CPT | Performed by: INTERNAL MEDICINE

## 2022-09-13 PROCEDURE — 6360000002 HC RX W HCPCS: Performed by: STUDENT IN AN ORGANIZED HEALTH CARE EDUCATION/TRAINING PROGRAM

## 2022-09-13 PROCEDURE — 85014 HEMATOCRIT: CPT

## 2022-09-13 PROCEDURE — 82947 ASSAY GLUCOSE BLOOD QUANT: CPT

## 2022-09-13 PROCEDURE — 99232 SBSQ HOSP IP/OBS MODERATE 35: CPT | Performed by: PSYCHIATRY & NEUROLOGY

## 2022-09-13 PROCEDURE — 6360000002 HC RX W HCPCS: Performed by: INTERNAL MEDICINE

## 2022-09-13 PROCEDURE — 2500000003 HC RX 250 WO HCPCS: Performed by: STUDENT IN AN ORGANIZED HEALTH CARE EDUCATION/TRAINING PROGRAM

## 2022-09-13 PROCEDURE — 2060000000 HC ICU INTERMEDIATE R&B

## 2022-09-13 RX ORDER — FOLIC ACID 1 MG/1
1 TABLET ORAL DAILY
Status: DISCONTINUED | OUTPATIENT
Start: 2022-09-13 | End: 2022-09-22 | Stop reason: HOSPADM

## 2022-09-13 RX ORDER — OLANZAPINE 5 MG/1
5 TABLET, ORALLY DISINTEGRATING ORAL NIGHTLY
Status: DISCONTINUED | OUTPATIENT
Start: 2022-09-13 | End: 2022-09-14

## 2022-09-13 RX ORDER — HALOPERIDOL 1 MG/1
1 TABLET ORAL EVERY 6 HOURS PRN
Status: DISCONTINUED | OUTPATIENT
Start: 2022-09-13 | End: 2022-09-22 | Stop reason: HOSPADM

## 2022-09-13 RX ORDER — INSULIN GLARGINE 100 [IU]/ML
20 INJECTION, SOLUTION SUBCUTANEOUS 2 TIMES DAILY
Status: DISCONTINUED | OUTPATIENT
Start: 2022-09-13 | End: 2022-09-15

## 2022-09-13 RX ORDER — GAUZE BANDAGE 2" X 2"
100 BANDAGE TOPICAL DAILY
Status: DISCONTINUED | OUTPATIENT
Start: 2022-09-13 | End: 2022-09-22 | Stop reason: HOSPADM

## 2022-09-13 RX ADMIN — SODIUM PHOSPHATE, MONOBASIC, MONOHYDRATE AND SODIUM PHOSPHATE, DIBASIC, ANHYDROUS 10 MMOL: 276; 142 INJECTION, SOLUTION INTRAVENOUS at 09:41

## 2022-09-13 RX ADMIN — POTASSIUM CHLORIDE 10 MEQ: 7.46 INJECTION, SOLUTION INTRAVENOUS at 09:42

## 2022-09-13 RX ADMIN — THIAMINE HCL TAB 100 MG 100 MG: 100 TAB at 14:43

## 2022-09-13 RX ADMIN — POTASSIUM CHLORIDE 10 MEQ: 7.46 INJECTION, SOLUTION INTRAVENOUS at 10:48

## 2022-09-13 RX ADMIN — SODIUM CHLORIDE 40 MG: 9 INJECTION INTRAMUSCULAR; INTRAVENOUS; SUBCUTANEOUS at 08:59

## 2022-09-13 RX ADMIN — ACETAMINOPHEN 650 MG: 325 TABLET, FILM COATED ORAL at 06:04

## 2022-09-13 RX ADMIN — IRON SUCROSE 400 MG: 20 INJECTION, SOLUTION INTRAVENOUS at 15:37

## 2022-09-13 RX ADMIN — POTASSIUM CHLORIDE 10 MEQ: 7.46 INJECTION, SOLUTION INTRAVENOUS at 12:23

## 2022-09-13 RX ADMIN — ATORVASTATIN CALCIUM 40 MG: 40 TABLET, FILM COATED ORAL at 19:25

## 2022-09-13 RX ADMIN — TRAZODONE HYDROCHLORIDE 150 MG: 100 TABLET ORAL at 19:25

## 2022-09-13 RX ADMIN — SODIUM CHLORIDE 40 MG: 9 INJECTION INTRAMUSCULAR; INTRAVENOUS; SUBCUTANEOUS at 19:25

## 2022-09-13 RX ADMIN — INSULIN GLARGINE 20 UNITS: 100 INJECTION, SOLUTION SUBCUTANEOUS at 22:26

## 2022-09-13 RX ADMIN — MIRTAZAPINE 7.5 MG: 15 TABLET, FILM COATED ORAL at 19:25

## 2022-09-13 RX ADMIN — FOLIC ACID 1 MG: 1 TABLET ORAL at 14:42

## 2022-09-13 ASSESSMENT — PAIN DESCRIPTION - LOCATION: LOCATION: HEAD;NECK

## 2022-09-13 ASSESSMENT — PAIN SCALES - GENERAL: PAINLEVEL_OUTOF10: 3

## 2022-09-13 NOTE — PROGRESS NOTES
Attempted to get 12 midnight VS.  Patient became agitated and said \"no just leave me alone\". Unable to do VS due to patient agitation.

## 2022-09-13 NOTE — PROGRESS NOTES
Patient BS was 69 this morning. Patient given osmin crackers and sugar packet in her coffee. PCT went to recheck sugar and patient refused to have BS rechecked.

## 2022-09-13 NOTE — PROGRESS NOTES
Upon shift hand off pt found with telemetry off, RN discussed purpose of telemetry monitor and educated upon its importance. Pt refused to allow telemetry monitor to be replaced. Also found at shift handoff pt has been refusing IV fluids and IV iron most of the day. RN discussed importance of iron and IV fluids with pt, pt continues to refuse IV iron and fluids. Pt continually states \"I just want to sleep and be left alone. \" Salvador Wright CNP notified of above.

## 2022-09-13 NOTE — PLAN OF CARE
Problem: Pain  Goal: Verbalizes/displays adequate comfort level or baseline comfort level  9/13/2022 0303 by Emily Feliciano RN  Outcome: Progressing  Note: Pt denies pain this shift. Problem: Skin/Tissue Integrity  Goal: Absence of new skin breakdown  Description: 1. Monitor for areas of redness and/or skin breakdown  2. Assess vascular access sites hourly  3. Every 4-6 hours minimum:  Change oxygen saturation probe site  4. Every 4-6 hours:  If on nasal continuous positive airway pressure, respiratory therapy assess nares and determine need for appliance change or resting period. 9/13/2022 0303 by Emily Feliciano RN  Outcome: Progressing     Problem: Safety - Adult  Goal: Free from fall injury  9/13/2022 0303 by Emily Feliciano RN  Outcome: Progressing  Note: The patient remained free from falls this shift, call light within reach, bed in locked and lowest position. Side rails up x2. Tele monitor in place for impulsivity of the pt. Problem: Neurosensory - Adult  Goal: Absence of seizures  9/13/2022 0303 by Emily Feliciano RN  Outcome: Progressing     Problem: Neurosensory - Adult  Goal: Remains free of injury related to seizures activity  9/13/2022 0303 by Emily Feliciano RN  Outcome: Progressing     Problem: Cardiovascular - Adult  Goal: Absence of cardiac dysrhythmias or at baseline  9/13/2022 0303 by Emily Feliciano RN  Outcome: Progressing     Problem: Gastrointestinal - Adult  Goal: Minimal or absence of nausea and vomiting  9/13/2022 0303 by Emily Feliciano RN  Outcome: Progressing     Problem: Genitourinary - Adult  Goal: Urinary catheter remains patent  9/13/2022 0303 by Emily Feliciano RN  Outcome: Progressing     Problem: Metabolic/Fluid and Electrolytes - Adult  Goal: Electrolytes maintained within normal limits  9/13/2022 0303 by Emily Feliciano RN  Outcome: Progressing  Note: Pt had potassium, magnesium, phosphate replaced during this shift.       Problem: Chronic Conditions and Co-morbidities  Goal: Patient's chronic conditions and co-morbidity symptoms are monitored and maintained or improved  9/13/2022 0303 by Beatris Fonseca RN  Outcome: Progressing

## 2022-09-13 NOTE — TELEPHONE ENCOUNTER
----- Message from Madelyn Luong sent at 9/13/2022  4:01 PM EDT -----  Subject: Message to Provider    QUESTIONS  Information for Provider? Patient is currently at [facility] for   non compliance and found DKA.  Patient is currently refusing all treatment,   please advise   ---------------------------------------------------------------------------  --------------  [id number]  [phone]; OK to leave message on voicemail  ---------------------------------------------------------------------------  --------------  SCRIPT ANSWERS  undefined

## 2022-09-13 NOTE — PROGRESS NOTES
Patient is refusing to have iron infusion. Patient also refusing lab draws. Patient having confusion. Nurse tried to explain multiple times why she needs iron and lab draws. Patient still refusing.

## 2022-09-13 NOTE — PROGRESS NOTES
Pt took tele monitor off and adamantly refused to put it back on. Pt educated. Will attempt to put tele monitor back on when pt is calm again.

## 2022-09-13 NOTE — PROGRESS NOTES
Message out to Dr. Aiden White to see if he could come and re-evaluate patient per residents request. Awaiting response

## 2022-09-13 NOTE — PROGRESS NOTES
Department of Internal Medicine  Nephrology Jame Hernandez MD  Progress Note    Reason for consultation: Management of acute kidney injury. Consulting physician: Faisal Snyder MD.    Interval history:   Patient was seen  today   no acute events overnight  Serum sodium remains stable at 142  Pressure stable  Patient refused for physical exam    History of present illness: This is a 56 y.o. female with a significant past medical history of Severe bipolar disorder, type 2 diabetes mellitus [insulin requiring], cannabis and cocaine abuse, systemic hypertensionFibromyalgia and s/p prior cerebrovascular accident, who was found unresponsive at home where she lives alone. She had apparently not been taking her insulin as her refrigerator was filled with unused insulin vials. Place was activated after family had not heard from patient for several days and when EMS arrived, patient was pretty confused and obtunded with severe hypotension and BP could not be obtained initially. Blood glucose was greater than 1000 mg/dL. Blood pressure in the emergency department was 86/64 mmHg with pulse 100 and respiratory rate 24/min. Laboratory studies were remarkable for hemoconcentration, serum potassium 5.5 mmol/L, blood glucose 1036 mg/dL, serum bicarbonate 19 mmol/L [anion gap 25 mmol/L] and elevated BUN/creatinine 129/3.56 mg/dL and hence nephrology consultation. At the time of this encounter, patient remained confused and appears quite dry and delirious to occasionally lucid.     Scheduled Meds:   thiamine mononitrate  100 mg Oral Daily    folic acid  1 mg Oral Daily    iron sucrose  400 mg IntraVENous Once    pantoprazole (PROTONIX) 40 mg injection  40 mg IntraVENous BID    insulin lispro  0-4 Units SubCUTAneous TID WC    insulin lispro  0-4 Units SubCUTAneous Nightly    atorvastatin  40 mg Oral Nightly    budesonide-formoterol  2 puff Inhalation BID    insulin glargine  30 Units SubCUTAneous BID    [Held by provider] metFORMIN  1,000 mg Oral BID     mirtazapine  7.5 mg Oral Nightly    traZODone  150 mg Oral Nightly    venlafaxine  150 mg Oral Daily with breakfast    fluticasone  1 spray Each Nostril Daily     Continuous Infusions:   sodium chloride Stopped (22 1312)    dextrose      dextrose       PRN Meds:.potassium chloride, magnesium sulfate, acetaminophen, glucose, dextrose bolus **OR** dextrose bolus, dextrose, glucagon (rDNA), dextrose, albuterol sulfate HFA, sodium phosphate IVPB **OR** sodium phosphate IVPB **OR** sodium phosphate IVPB, polyethylene glycol, haloperidol lactate    Physical Exam:    VITALS:  /72   Pulse (!) 102   Temp 98.7 °F (37.1 °C)   Resp 18   Ht 5' 5\" (1.651 m)   Wt 214 lb 8.1 oz (97.3 kg)   LMP  (LMP Unknown)   SpO2 99%   PF (!) 15 L/min   BMI 35.70 kg/m²   TEMPERATURE:  Current - Temp: 98.7 °F (37.1 °C); Max - Temp  Av.7 °F (37.1 °C)  Min: 98.7 °F (37.1 °C)  Max: 98.7 °F (37.1 °C)  RESPIRATIONS RANGE: Resp  Av  Min: 18  Max: 18  PULSE RANGE: Pulse  Av  Min: 93  Max: 102  BLOOD PRESSURE RANGE:  Systolic (71BMF), RIP:385 , Min:127 , WGN:054 ; Diastolic (56GTF), KVY:71, Min:72, Max:87  PULSE OXIMETRY RANGE: SpO2  Av.5 %  Min: 98 %  Max: 99 %  24HR INTAKE/OUTPUT:    Intake/Output Summary (Last 24 hours) at 2022 1349  Last data filed at 2022 1221  Gross per 24 hour   Intake 1525 ml   Output 3300 ml   Net -1775 ml         Neuro:  Grossly normal    CBC:   Recent Labs     22  0446 22  1104 22  1921 22  1527 22  1132   WBC 11.6*  --   --   --   --    HGB 8.1*   < > 8.4* 7.6* 7.1*   *  --   --   --   --     < > = values in this interval not displayed.      BMP:    Recent Labs     22  0447 22  1104 22  2340 22  0630 22  1132   *   < > 141 142 139   K 3.1*   < > 4.0 3.5* 4.1   *   < > 111* 111* 109*   CO2 21   < > 23 24 21   BUN 28*  --   --   --   --    CREATININE 1.00* --   --   --   --    GLUCOSE 89  --   --   --   --     < > = values in this interval not displayed. Lab Results   Component Value Date/Time    NITRU NEGATIVE 09/08/2022 04:45 PM    COLORU Yellow 09/08/2022 04:45 PM    PHUR 5.5 09/08/2022 04:45 PM    WBCUA 6 TO 9 09/08/2022 04:45 PM    RBCUA 0 TO 2 09/08/2022 04:45 PM    MUCUS NOT REPORTED 01/24/2022 12:04 PM    TRICHOMONAS NOT REPORTED 01/24/2022 12:04 PM    YEAST FEW 08/04/2022 02:30 PM    BACTERIA FEW 08/04/2022 02:30 PM    CLARITYU clear 07/12/2021 09:57 AM    SPECGRAV 1.015 09/08/2022 04:45 PM    LEUKOCYTESUR NEGATIVE 09/08/2022 04:45 PM    UROBILINOGEN Normal 09/08/2022 04:45 PM    BILIRUBINUR NEGATIVE  Verified by ictotest. 09/08/2022 04:45 PM    BILIRUBINUR negative 07/12/2021 09:57 AM    BLOODU negative 07/12/2021 09:57 AM    GLUCOSEU 2+ 09/08/2022 04:45 PM    KETUA SMALL 09/08/2022 04:45 PM    AMORPHOUS NOT REPORTED 01/24/2022 12:04 PM     Urine Sodium:     Lab Results   Component Value Date/Time    EMILIO <20 09/08/2022 05:28 PM     Urine Protein:     Lab Results   Component Value Date/Time    TPU 13 07/08/2020 11:19 AM     Urine Creatinine:     Lab Results   Component Value Date/Time    LABCREA 121.0 09/08/2022 05:28 PM     IMPRESSION/RECOMMENDATIONS:      1. Acute kidney injury - most consistent with prerenal azotemia secondary to dehydration from osmotic diuresis as well as ischemic acute tubular necrosis secondary to hypotension. Random urine electrolytes are however more consistent with prerenal azotemia or acute glomerulonephritis. Urine output and renal function continue to improve. 2.  Diabetic ketoacidosis - Resolved. 3.  Systemic hypertension - blood pressure is adequately controlled    4. Hypernatremia -improved   serum sodium is down to 142    5. Hypophosphatemia -replace electrolytes per sliding scale    6. Hypokalemia - leplace with potassium sliding scale.     7.  Hypomagnesemia [1.4 mg/dL] -replace magnesium per sliding scale    8. Thrombocytopenia -Heparin antiplatelet antibodies. Doubt thrombotic microangiopathy. Plan    Electrolytes improved kidney function improved  Will sign off from the case  Please call with questions    Prognosis is guarded.     Cecelia Benoit MD   Attending Nephrologist  9/13/2022 1:49 PM

## 2022-09-13 NOTE — PROGRESS NOTES
Pt is still adamantly refusing tele monitor. Pt states she does not want to be poked, prodded or stuck with anything else since she already has a catheter and a central line. Adding more wires would just be too much for her to handle.

## 2022-09-13 NOTE — PROGRESS NOTES
BEHAVIORAL HEALTH FOLLOW-UP NOTE     9/13/2022     Patient was seen and examined in person, Chart reviewed   Patient's case discussed with staff/team    Chief Complaint: Altered mental status and bipolar disorder    Interim History:   I noted that the patient was seen by psychiatry consult service on 9/10/2022. It is documented that the patient was found unresponsive in her home. She is known to have presentation with altered mental status due to not taking her insulin. Her cognition was fluctuating. The patient has been referred because of refusing medical care, aggression noncompliance and harm to self. I have spoken to the patient's nurse. She appears to be having conversations with unseen people and appears to occasionally have visual hallucinations. She has been labile in her mood. The patient was seen at bedside. She is rambling in her speech. She is oriented to situation and self but not to place or time. She recognizes the need to receive medical treatment but is also reluctant to have it. The patient has been intermittently noncompliant with medications. She has threatened to pull out her central line. The patient is intermittently labile. She denies any suicidal thoughts.     /60   Pulse 85   Temp 98.4 °F (36.9 °C) (Oral)   Resp 18   Ht 5' 5\" (1.651 m)   Wt 214 lb 8.1 oz (97.3 kg)   LMP  (LMP Unknown)   SpO2 93%   PF (!) 15 L/min   BMI 35.70 kg/m²        Energy:    [x] Normal/Unchanged  [] Increased  [] Decreased        Aggression:  [] yes  [x] no       PAST MEDICAL/PSYCHIATRIC HISTORY:   Past Medical History:   Diagnosis Date    Acid reflux 5/29/2017    Acute cystitis with hematuria 10/11/2016    Acute non-recurrent maxillary sinusitis 11/28/2017    Asthma     Bipolar 1 disorder ([address] 1/4/2018    Bipolar disorder, mixed ([address]     BMI 34.0-34.9,adult 11/28/2017    Cannabis use disorder, severe, dependence ([address] 12/19/2017    Cerebrovascular accident (CVA) ([facility] [address] 6/14/2017 Other Topics Concern    Not on file   Social History Narrative    Not on file     Social Determinants of Health     Financial Resource Strain: Not on file   Food Insecurity: Not on file   Transportation Needs: Not on file   Physical Activity: Not on file   Stress: Not on file   Social Connections: Unknown    Frequency of Communication with Friends and Family: Once a week    Frequency of Social Gatherings with Friends and Family: Not on file    Attends Congregation Services: Not on file    Active Member of Clubs or Organizations: No    Attends Club or Organization Meetings: Never    Marital Status:    Intimate Partner Violence: Not on file   Housing Stability: Not on file           ROS:  [x] All negative/unchanged except if checked.  Explain positive(checked items) below:  [] Constitutional  [] Eyes  [] Ear/Nose/Mouth/Throat  [] Respiratory  [] CV  [] GI  []   [] Musculoskeletal  [] Skin/Breast  [] Neurological  [] Endocrine  [] Heme/Lymph  [] Allergic/Immunologic    Explanation:     MEDICATIONS:    Current Facility-Administered Medications:     thiamine mononitrate tablet 100 mg, 100 mg, Oral, Daily, Jose Macedo MD, 100 mg at 68/05/21 8106    folic acid (FOLVITE) tablet 1 mg, 1 mg, Oral, Daily, Jose Macedo MD, 1 mg at 09/13/22 1442    iron sucrose (VENOFER) 400 mg in sodium chloride 0.9 % 250 mL IVPB, 400 mg, IntraVENous, Once, Jose Macedo MD, Last Rate: 108 mL/hr at 09/13/22 1537, 400 mg at 09/13/22 1537    0.9 % sodium chloride infusion, , IntraVENous, Continuous, Tressa Light MD, Stopped at 09/13/22 1312    potassium chloride 10 mEq/100 mL IVPB (Peripheral Line), 10 mEq, IntraVENous, PRN, Nadege Kwan MD, Stopped at 09/13/22 1312    magnesium sulfate 1000 mg in dextrose 5% 100 mL IVPB, 1,000 mg, IntraVENous, PRN, Nadege Kwan MD, Stopped at 09/12/22 2113    acetaminophen (TYLENOL) tablet 650 mg, 650 mg, Oral, Q4H PRN, Joshua Chand MD, 650 mg at 09/13/22 0604    pantoprazole (PROTONIX) 40 mg in sodium chloride (PF) 10 mL injection, 40 mg, IntraVENous, BID, Antolin Felix MD, 40 mg at 09/13/22 0859    insulin lispro (HUMALOG) injection vial 0-4 Units, 0-4 Units, SubCUTAneous, TID WC, Antolin Felix MD, 1 Units at 09/10/22 1652    insulin lispro (HUMALOG) injection vial 0-4 Units, 0-4 Units, SubCUTAneous, Nightly, Antolin Felix MD    glucose chewable tablet 16 g, 4 tablet, Oral, PRN, Antolin Felix MD    dextrose bolus 10% 125 mL, 125 mL, IntraVENous, PRN **OR** dextrose bolus 10% 250 mL, 250 mL, IntraVENous, PRN, Antolin Felix MD    dextrose 10 % infusion, , IntraVENous, Continuous PRN, Antolin Felix MD    glucagon (rDNA) injection 1 mg, 1 mg, SubCUTAneous, PRN, Linda Ty MD    dextrose 10 % infusion, , IntraVENous, Continuous PRN, Linda Ty MD    albuterol sulfate HFA (PROVENTIL;VENTOLIN;PROAIR) 108 (90 Base) MCG/ACT inhaler 2 puff, 2 puff, Inhalation, Q4H PRN, Jose Macedo MD    atorvastatin (LIPITOR) tablet 40 mg, 40 mg, Oral, Nightly, Jose Macedo MD, 40 mg at 09/12/22 2001    budesonide-formoterol (SYMBICORT) 80-4.5 MCG/ACT inhaler 2 puff, 2 puff, Inhalation, BID, Jose Macedo MD, 2 puff at 09/11/22 1942    insulin glargine (LANTUS) injection vial 30 Units, 30 Units, SubCUTAneous, BID, Jose Macedo MD, 30 Units at 09/12/22 2014    [Held by provider] metFORMIN (GLUCOPHAGE) tablet 1,000 mg, 1,000 mg, Oral, BID NEFTALI, Jose Macedo MD    mirtazapine (REMERON) tablet 7.5 mg, 7.5 mg, Oral, Nightly, Jose Macedo MD, 7.5 mg at 09/12/22 2001    traZODone (DESYREL) tablet 150 mg, 150 mg, Oral, Nightly, Jose Macedo MD, 150 mg at 09/12/22 2000    venlafaxine (EFFEXOR XR) extended release capsule 150 mg, 150 mg, Oral, Daily with breakfast, Jose Macedo MD, 150 mg at 09/11/22 0841    fluticasone (FLONASE) 50 MCG/ACT nasal spray 1 spray, 1 spray, Each Nostril, Daily, Jose Macedo MD, 1 spray at 09/11/22 0901    sodium phosphate 10 mmol in sodium chloride 0.9 % 250 mL IVPB, 10 mmol, IntraVENous, PRN, Stopped at 09/13/22 1311 **OR** sodium phosphate 15 mmol in dextrose 5 % 250 mL IVPB, 15 mmol, IntraVENous, PRN, Stopped at 09/13/22 0125 **OR** sodium phosphate 20 mmol in dextrose 5 % 500 mL IVPB, 20 mmol, IntraVENous, PRN, Jose Macedo MD    polyethylene glycol (GLYCOLAX) packet 17 g, 17 g, Oral, Daily PRN, Jose Macedo MD    haloperidol lactate (HALDOL) injection 5 mg, 5 mg, IntraMUSCular, Q6H PRN, Jose Macedo MD, 5 mg at 09/09/22 1466      Examination:  /60   Pulse 85   Temp 98.4 °F (36.9 °C) (Oral)   Resp 18   Ht 5' 5\" (1.651 m)   Wt 214 lb 8.1 oz (97.3 kg)   LMP  (LMP Unknown)   SpO2 93%   PF (!) 15 L/min   BMI 35.70 kg/m²      Medication side effects(SE): none    Mental Status Examination:    Level of consciousness:  within normal limits   Appearance:  fair grooming and fair hygiene  Behavior/Motor:  no abnormalities noted  Attitude toward examiner:  cooperative  Speech:  hyperverbal   Mood: labile  Affect:  mood congruent  Thought processes:  overabundance of ideas   Thought content:  Homicidal ideation - none  Suicidal Ideation:  denies suicidal ideation  Delusions:  no evidence of delusions  Perceptual Disturbance:  denies any perceptual disturbance  Cognition:  disoriented   Concentration distractible  Insight poor   Judgement poor     ASSESSMENT:   Patient symptoms are:  [] Well controlled  [] Improving  [] Worsening  [x] No change      Diagnosis: Delirium due to medical condition  Principal Problem:    DKA, type 1, not at goal [facility])  Active Problems:    DKA, type 2, not at goal [facility])    Uremic encephalopathy    Unspecified mood (affective) disorder (HCC)    Coffee ground emesis    Elevated LFTs    Cocaine abuse ([facility] [address]  Resolved Problems:    * No resolved hospital problems.  *      LABS:    Recent Labs     09/11/22  0446 09/11/22  1104 09/11/22  1921 09/12/22  1527 09/13/22  1132   WBC 11.6*  --   --   --   --    HGB 8.1*   < > 8.4* 7.6* 7.1*   *  --   --   --   -- < > = values in this interval not displayed. Recent Labs     09/11/22  0447 09/11/22  1104 09/12/22  2340 09/13/22  0630 09/13/22  1132   *   < > 141 142 139   K 3.1*   < > 4.0 3.5* 4.1   *   < > 111* 111* 109*   CO2 21   < > 23 24 21   BUN 28*  --   --   --   --    CREATININE 1.00*  --   --   --   --    GLUCOSE 89  --   --   --   --     < > = values in this interval not displayed. No results for input(s): BILITOT, ALKPHOS, AST, ALT in the last 72 hours. Lab Results   Component Value Date/Time    BARBSCNU NEGATIVE 09/08/2022 05:29 PM    LABBENZ NEGATIVE 09/08/2022 05:29 PM    LABMETH NEGATIVE 09/08/2022 05:29 PM    PPXUR NOT REPORTED 11/04/2021 03:43 PM     Lab Results   Component Value Date/Time    TSH 1.10 08/04/2022 12:21 AM     No results found for: LITHIUM  No results found for: VALPROATE, CBMZ    RISK ASSESSMENT: Moderate risk of agitation    Treatment Plan:  Reviewed current Medications with the patient. Zydis 5 mg at nighttime added    Risks, benefits, side effects, drug-to-drug interactions and alternatives to treatment were discussed. The patient  consented to treatment. Encourage patient to attend group and other milieu activities. Discharge planning discussed with the patient and treatment team.    PSYCHOTHERAPY/COUNSELING:  [] Therapeutic interview  [x] Supportive  [] CBT  [] Ongoing  [] Other                                            Yocasta Oliver is a 56 y.o. female being evaluated face to face.     --Yanique Felipe MD on 9/13/2022 at 3:50 PM    An electronic signature was used to authenticate this note. **This report has been created using voice recognition software. It may contain minor errors which are inherent in voice recognition technology. **

## 2022-09-13 NOTE — PROGRESS NOTES
Patient states to nurse \"she no longer wants to be hooked up to IV fluids and if nurse does not unhook everything right at this moment she will rip her central line out. \" Patient disconnected from IV fluids, phosphorus and potasium. Central lines flushed and alcohol caps placed. Residents notified.

## 2022-09-13 NOTE — CARE COORDINATION
ONGOING DISCHARGE PLAN:    Patient is alert and oriented x4. Spoke with patient regarding discharge plan and patient confirms that plan is still to discharge to home   Patient refused for vns   Patient has some confusion   Patient states that we have all of her belongings upstairs  Patient states that she will go home   Patient will need a cab ride home, Pt is agreeable       Will continue to follow for additional discharge needs.     Electronically signed by Sophie Mckinnon RN on 9/13/2022 at 3:53 PM

## 2022-09-13 NOTE — PROGRESS NOTES
[address]    PROGRESS NOTE             9/13/2022    9:01 AM    Name:   Nida Blevins  MRN:     [id number]     Acct:      [de-identified]   Room:   2112/2112-01  IP Day:  5  Admit Date:  9/8/2022 11:01 AM    PCP:  Frances Dumont MD  Code Status:  Full Code    Subjective:     C/C:   Chief Complaint   Patient presents with    Hyperglycemia    Altered Mental Status     Interval History Status: improved. Patient was seen and evaluated at bedside. Today electrolyte panel is within normal range except for potassium of 3.5, replacement protocol was initiated. This morning BG is 69. As per nurse, she is uncooperative and refuses to take her medications. Brief History:     The patient is a 56 y.o. Non- / non  female with a history of asthma, bipolar 1 disorder, hypertension, stroke, polysubstance abuse, DM 2, degenerative disc disease, and depression with psychotic features, who presented to the ED with altered mental status. The patient was found unresponsive at her home where she lives alone. The patient had not been taking her insulin as her refrigerator was filled with unused insulin vials. The patients family had not heard from the patient for several days and EMS was called. The patient was found confused and obtunded, hypotensive, and hyperglycemic. The ED, the patient was hypotensive (BP 86/64) and tachypneic (RR 24). Blood glucose was 1,034. Serum potassium was 5.5, bicarbonate was 19 mmol/L [anion gap 25 mmol/L). Cr was 3.56 (baseline Cr 0.73). Levophed was administered. The patient was admitted for management of DKA and DKA protocol was initiated    Review of Systems:     Reason unable to perform ROS: Pt is uncooperative      Medications: Allergies:     Allergies   Allergen Reactions    Bactrim [Sulfamethoxazole-Trimethoprim] Swelling    Adhesive Tape Rash       Current Meds:   Scheduled Meds: pantoprazole (PROTONIX) 40 mg injection  40 mg IntraVENous BID    insulin lispro  0-4 Units SubCUTAneous TID WC    insulin lispro  0-4 Units SubCUTAneous Nightly    atorvastatin  40 mg Oral Nightly    budesonide-formoterol  2 puff Inhalation BID    insulin glargine  30 Units SubCUTAneous BID    [Held by provider] metFORMIN  1,000 mg Oral BID WC    mirtazapine  7.5 mg Oral Nightly    traZODone  150 mg Oral Nightly    venlafaxine  150 mg Oral Daily with breakfast    fluticasone  1 spray Each Nostril Daily     Continuous Infusions:    sodium chloride 75 mL/hr at 09/12/22 2341    dextrose      dextrose       PRN Meds: potassium chloride, magnesium sulfate, acetaminophen, glucose, dextrose bolus **OR** dextrose bolus, dextrose, glucagon (rDNA), dextrose, albuterol sulfate HFA, sodium phosphate IVPB **OR** sodium phosphate IVPB **OR** sodium phosphate IVPB, polyethylene glycol, haloperidol lactate    Data:     Past Medical History:   has a past medical history of Acid reflux, Acute cystitis with hematuria, Acute non-recurrent maxillary sinusitis, Asthma, Bipolar 1 disorder ([address] Bipolar disorder, mixed ([address] BMI 34.0-34.9,adult, Cannabis use disorder, severe, dependence ([address] Cerebrovascular accident (CVA) ([address] Chest pain, Chronic renal insufficiency, Cocaine abuse ([address] COVID-19 virus RNA test result unknown, DDD (degenerative disc disease), cervical, Diabetes mellitus ([address] Dizziness, Fibromyalgia, History of stroke, Homicidal ideation, Hyperglycemia, Hypertension, Hypotension, IDDM (insulin dependent diabetes mellitus), Lupus ([address] Migraine, Neuropathy, Neuropathy, Polysubstance abuse ([address] Post traumatic stress disorder (PTSD), Posttraumatic stress disorder, Recurrent depression ([address] Recurrent major depressive disorder, in partial remission ([address] Screening mammogram, encounter for, Severe recurrent major depression with psychotic features ([address] Severe recurrent major depression without psychotic features [facility]), Stroke (cerebrum) ([facility] Stroke ([facility] Suicidal ideation, Suicidal intent, Vitamin D deficiency, and White matter changes. Social History:   reports that she has never smoked. She has never used smokeless tobacco. She reports that she does not currently use alcohol. She reports current drug use. Drugs: Marijuana (Weed) and Cocaine. Family History:   Family History   Problem Relation Age of Onset    Diabetes Mother     Stroke Mother     Diabetes Father     Diabetes Sister     Diabetes Brother     Other Son         GSW    No Known Problems Sister        Vitals:  /72   Pulse 93   Temp 98.7 °F (37.1 °C)   Resp 18   Ht 5' 5\" (1.651 m)   Wt 214 lb 8.1 oz (97.3 kg)   LMP  (LMP Unknown)   SpO2 99%   PF (!) 15 L/min   BMI 35.70 kg/m²   Temp (24hrs), Av.7 °F (37.1 °C), Min:98.7 °F (37.1 °C), Max:98.7 °F (37.1 °C)    Recent Labs     22  1652 22  0658 22  0811   POCGLU 129* 194* 69 122*       I/O(24Hr):     Intake/Output Summary (Last 24 hours) at 2022 0901  Last data filed at 2022 0554  Gross per 24 hour   Intake 1525 ml   Output 2900 ml   Net -1375 ml       Labs:    [unfilled]    Lab Results   Component Value Date/Time    SPECIAL NOT REPORTED 2022 12:47 PM     Lab Results   Component Value Date/Time    CULTURE NO GROWTH 5 DAYS 2022 12:18 PM       [unfilled]    Radiology:    ECHO Complete 2D W Doppler W Color    Result Date: 9/10/2022  [address] Transthoracic Echocardiography Report (TTE)  Patient Name Kelley Ortiz    Date of Study               2022               [facility]   Date of      1965  Gender                      Female  Birth   Age          56 year(s)  Race                        Black   Room Number          Height:                     65 inch, 165.1 cm   Corporate ID [id number]    Weight:                     180 pounds, 81.6 kg  #   Patient Acct [de-identified]   BSA:          1.89 m^2      BMI:      29.95  # kg/m^2   MR #         [id number]      Sonographer                 Carina Newsome   Accession #  [id number]  Interpreting Physician      [address]   Fellow                   Referring Nurse                           Practitioner   Interpreting             Referring Physician  Fellow  Type of Study   TTE procedure:2D Echocardiogram, M-Mode, Doppler, Color Doppler. Procedure Date Date: 09/09/2022 Start: 12:50 PM Study Location: [address] [place] [address] Technical Quality: Fair visualization Indications:Tachycardia. Patient Status: Inpatient Height: 65 inches Weight: 180 pounds BSA: 1.89 m^2 BMI: 29.95 kg/m^2 Rhythm: Sinus tachycardia HR: 108 bpm BP: 98/55 mmHg CONCLUSIONS Summary Left ventricle is normal in size and wall thickness. Global left ventricular systolic function is normal. Estimated LV EF 60-65 %. No obvious wall motion abnormality seen. Left atrium is normal in size. No significant valvular regurgitation or stenosis seen. Signature ----------------------------------------------------------------------------  Electronically signed by Carina Newsome(Sonographer) on 09/09/2022 02:20  PM ---------------------------------------------------------------------------- ----------------------------------------------------------------------------  Electronically signed by Suleiman Ponce(Interpreting physician) on 09/10/2022  08:12 AM ---------------------------------------------------------------------------- FINDINGS Left Atrium Left atrium is normal in size. Left Ventricle Left ventricle is normal in size and wall thickness. Global left ventricular systolic function is normal. Estimated LV EF 60-65 %. No obvious wall motion abnormality seen. Right Atrium Right atrium is normal in size. Right Ventricle Normal right ventricular size and function. Mitral Valve No obvious valvular abnormality seen. No evidence of mitral regurgitation.  Aortic Valve No obvious valvular abnormality seen. No evidence of aortic insufficiency or stenosis. Tricuspid Valve Tricuspid valve was not well visualized. Insignificant tricuspid regurgitation, unable to estimate RVSP. Pulmonic Valve Pulmonic valve was not well visualized. No evidence of pulmonic insufficiency or stenosis. Pericardial Effusion No significant pericardial effusion is seen. Pleural Effusion No pleural effusion seen. Miscellaneous Normal aortic root dimension. IVC not well visualized. E/E' average = 8.5. M-mode / 2D Measurements & Calculations:   LVIDd:3.79 cm(3.7 - 5.6 cm)      Diastolic IFILXP:32.1 ml  UQKTY:4.83 cm(2.2 - 4.0 cm)      Systolic XFWTI.56 ml  JGQX:3.21 cm(0.6 - 1.1 cm)       Aortic Root:3.6 cm(2.0 - 3.7 cm)  LVPWd:0.7 cm(0.6 - 1.1 cm)       LA Dimension: 2.3 cm(1.9 - 4.0 cm)  Fractional Shortenin.55 %    LA volume/Index: 33.7 ml /18m^2  Calculated LVEF (%): 86.38 %     LVOT:1.7 cm   Mitral:                              Aortic   Peak E-Wave: 0.75 m/s                Peak Velocity: 1.51 m/s  Peak A-Wave: 1.15 m/s                Mean Velocity: 0.86 m/s  E/A Ratio: 0.65                      Peak Gradient: 9.12 mmHg  Peak Gradient: 2.26 mmHg             Mean Gradient: 4 mmHg  Deceleration Time: 130 msec                                        Area (continuity): 2.26 cm^2                                       AV VTI: 20.6 cm  Septal Wall E' velocity:0.07 m/s Lateral Wall E' velocity:0.12 m/s    XR ABDOMEN (KUB) (SINGLE AP VIEW)    Result Date: 2022  EXAMINATION: ONE SUPINE XRAY VIEW(S) OF THE ABDOMEN 2022 9:33 am COMPARISON: 2022, 2022 HISTORY: ORDERING SYSTEM PROVIDED HISTORY: NGT placement, dark liquid coming through NG TECHNOLOGIST PROVIDED HISTORY: NGT placement, dark liquid coming through NG Reason for Exam: NGT placement, dark liquid coming through NG FINDINGS: Enteric tube coiled in the body of the stomach. Nonobstructive bowel gas pattern. Mild stool burden.   Multiple external devices project over the patient. No acute osseous abnormality identified. Enteric tube coiled at the level of the body of the stomach. Nonobstructive bowel gas pattern. CT HEAD WO CONTRAST    Result Date: 9/8/2022  EXAMINATION: CT OF THE HEAD WITHOUT CONTRAST  9/8/2022 11:38 am TECHNIQUE: CT of the head was performed without the administration of intravenous contrast. Automated exposure control, iterative reconstruction, and/or weight based adjustment of the mA/kV was utilized to reduce the radiation dose to as low as reasonably achievable. COMPARISON: MRI 08/04/2022, 08/03/2022 and CT 08/03/2022. HISTORY: ORDERING SYSTEM PROVIDED HISTORY: Mental Status Changes TECHNOLOGIST PROVIDED HISTORY: Mental Status Changes Decision Support Exception - unselect if not a suspected or confirmed emergency medical condition->Emergency Medical Condition (MA) Is the patient pregnant?->No Reason for Exam: AMS Additional signs and symptoms: AMS FINDINGS: BRAIN/VENTRICLES: There is no acute intracranial hemorrhage, mass effect or midline shift. No abnormal extra-axial fluid collection. Encephalomalacia in the left frontal lobe demonstrated corresponding to recently demonstrated ischemia. Cortical atrophy and chronic white matter changes in the brain and associated ventricular enlargement are again demonstrated. ORBITS: The visualized portion of the orbits demonstrate no acute abnormality. SINUSES: The visualized paranasal sinuses and mastoid air cells demonstrate no acute abnormality. SOFT TISSUES/SKULL:  No acute abnormality of the visualized skull or soft tissues. Chronic findings in the brain without acute CT abnormality identified. Expected evolution of recently demonstrated ischemia in the left frontal lobe. US LIVER    Result Date: 9/10/2022  EXAMINATION: RIGHT UPPER QUADRANT ULTRASOUND 9/10/2022 11:21 am COMPARISON: None.  HISTORY: ORDERING SYSTEM PROVIDED HISTORY: elevated lft TECHNOLOGIST PROVIDED HISTORY: elevated lft FINDINGS: LIVER:  The liver demonstrates normal echogenicity without evidence of intrahepatic biliary ductal dilatation. Hepatopetal flow portal vein. Liver 18.3 cm in length. BILIARY SYSTEM:  Gallstones in the gallbladder. Negative sonographic Gilliland's sign. Mild wall thickening at 4.6 mm. No pericholecystic fluid. Common bile duct is within normal limits measuring 6.8 mm. RIGHT KIDNEY: The right kidney is grossly unremarkable without evidence of hydronephrosis. PANCREAS:  Visualized portions of the pancreas are unremarkable. OTHER: No evidence of right upper quadrant ascites. Unremarkable ultrasound the liver. Cholelithiasis with mild gallbladder wall thickening. Negative sonographic Gilliland's sign. No pericholecystic fluid. Common duct upper normal at 6.8 mm. No intraductal stone demonstrated. Correlation clinical findings and laboratory values required. XR CHEST PORTABLE    Result Date: 9/8/2022  EXAMINATION: ONE XRAY VIEW OF THE CHEST 9/8/2022 7:53 pm COMPARISON: 08/17/2022 HISTORY: ORDERING SYSTEM PROVIDED HISTORY: line placement TECHNOLOGIST PROVIDED HISTORY: line placement Reason for Exam: Line placement FINDINGS: Central venous catheter tip overlies the right atrium. No pleural effusion or pneumothorax. Heart size is at the upper limits of normal.     Right central venous catheter tip overlies the right atrium. No pneumothorax. XR CHEST PORTABLE    Result Date: 8/17/2022  EXAMINATION: ONE XRAY VIEW OF THE CHEST 8/17/2022 8:09 pm COMPARISON: None. HISTORY: ORDERING SYSTEM PROVIDED HISTORY: chest pain TECHNOLOGIST PROVIDED HISTORY: chest pain FINDINGS: Chest radiograph: The cardiomediastinal silhouette and hilar contours are normal. The lungs are clear with no focal consolidation, pleural effusion or pneumothorax. The overlying soft tissue and osseous structures do not demonstrate acute abnormality. No acute intrathoracic pathology.      US RETROPERITONEAL COMPLETE    Result Date: 9/8/2022  EXAMINATION: RETROPERITONEAL ULTRASOUND OF THE KIDNEYS AND URINARY BLADDER 9/8/2022 COMPARISON: None HISTORY: ORDERING SYSTEM PROVIDED HISTORY: Acute kidney injury TECHNOLOGIST PROVIDED HISTORY: Acute kidney injury 77-year-old female with acute kidney injury FINDINGS: Kidneys: Right kidney measures 10.4 x 4.3 x 5.0 cm. Right renal cortical thickness measures 1.7 cm. Left kidney measures 9.4 x 4.8 x 4.8 cm. Left renal cortical thickness measures 1.8 cm. No hydronephrosis or perinephric fluid. No echogenic foci with posterior acoustic shadowing to suggest renal calculi. Gross preservation of the bilateral corticomedullary differentiation. Bladder: Not imaged. Unremarkable renal ultrasound. No hydronephrosis. Physical Examination:        Constitutional:       Appearance: She is ill-appearing. HENT:      Head: Normocephalic and atraumatic. Nose: No congestion or rhinorrhea. Eyes:      Extraocular Movements: Extraocular movements intact. Conjunctiva/sclera: Conjunctivae normal.      Pupils: Pupils are equal, round, and reactive to light. Cardiovascular:      Rate and Rhythm: Tachycardia present. Pulses: Normal pulses. Heart sounds: Normal heart sounds. No murmur heard. No friction rub. No gallop. Pulmonary:      Effort: Pulmonary effort is normal. No respiratory distress. Breath sounds: Normal breath sounds. No wheezing or rales. Abdominal:      General: Abdomen is flat. Bowel sounds are normal. There is no distension. Palpations: There is no mass. Tenderness: There is no guarding. Musculoskeletal:      Right lower leg: No edema. Left lower leg: No edema. Skin:     Capillary Refill: Capillary refill takes less than 2 seconds. Neurological:      General: No focal deficit present. Mental Status: She is alert but confused and disoriented. Sensory: No sensory deficit. Motor: No weakness.    Psychiatric: was not accessed due to altered mental status      Assessment:        Primary Problem  DKA, type 1, not at goal [facility])    Active Hospital Problems    Diagnosis Date Noted    Coffee ground emesis [K92.0] 09/11/2022     Priority: Medium    Elevated LFTs [R79.89] 09/11/2022     Priority: Medium    Unspecified mood (affective) disorder ([facility] [address] [F39] 09/10/2022     Priority: Medium    Uremic encephalopathy [G93.49, N19] 09/09/2022     Priority: Medium    DKA, type 1, not at goal [facility]) [E10.10] 09/08/2022     Priority: Medium    DKA, type 2, not at goal [facility]) [E11.10] 09/08/2022     Priority: Medium    Cocaine abuse ([facility] [address] [F14.10] 01/05/2018       Plan:        DKA, 2/2 poor insulin compliance, resolving  - Glucose on admssion: 1,034; glucose today 80  - BMP q4h, initial Mag and Phos levels  - Glucose checks 4 times daily  - Home Lantus 30 units BID   - Home metformin on hold  -IV Normal Saline at 250 mL/hr  -Hypoglycemia, phos and potassium replacement protocols in place   -General surgery placed central line      Acute Kidney Injury pre-renal azotemia secondary to dehydration  -Baseline Cr 0.73;  Cr on admission: 3.56; Cr today 1.00  -Change IV NS infusion to 0.45 NS at 150 ml/hr  -BMP q4 hours  -monitor urine output  -Nephrology consulted      Suspicion for upper GI bleeding  -Coffee-ground emesis from his NG tube, eventually become bilious  -Globin had been steadily climbing since admission, currently is stable around 8  -GI on board  -PPI started  -Upper endoscopy is planned once electrolyte abnormalities resolve     Hypotension secondary to dehydration by osmotic diuresis  -BP today:113/61  -Free water deficit 10 L  -Continue NS infusion     Hypernatremia  -most recent Na+ : 147;   -Continue IV NS infusion to 0.45 NS at 150 ml/hr      Acute metabolic encephalopathy s/s to electrolyte abnormality  -Restraints in place; patient oriented to person only  -Nephrology on board; electrolyte replacement  -EEG ordered -Restraints placed     Polysubstance abuse  -Urine tox positive for Fentanyl and cocaine  -Psychiatry consulted      Ventricular tachycardia  -Non-sustained VT secondary to electrolye imbalance, metabolic derangement and cocaine  -Continue to manage hyperglycemia and electrolytes  -Discontinue metoprolol  -Echo: Estimated LV EF 60-65        Depression with psychotic features  -Continue Haloperidol lactate 5 mg IM q 6 hours PRN  -Continue Mirtazapine 7.5 mg po nightly  -Continue Trazodone 150 mg po nightly  -Continue Venlafaxine 150 mg po daily  -Psychiatry consulted     Asthma  -Continue home Proventil ventolin 2 puff q 4 hours as needed   -Continue home Symbicort 2 puffs BID  -Continue home Flonase 1 spray each nostril daily      Code: Full code  Diet: Adult diet, 3 carbs  DVT prophylaxis: on hold    Joshua Chand MD  9/13/2022  9:01 AM     Attending Physician Statement  I have discussed the care of [address] [place] [address] and I have examined the patient myselft and taken ros and hpi , including pertinent history and exam findings,  with the resident. I have reviewed the key elements of all parts of the encounter with the resident. I agree with the assessment, plan and orders as documented by the resident.   Patient is still confused not oriented to day date and time but pleasant in conversation possible confabulation  Will do thiamine and folic acid  Discharge plan to F not a candidate for BHI per psychiatrist  Acute on chronic anemia with some coffee-ground vomit but no significant drop will do Protonix  IV Venofer 1 dose  400 mg    Electronically signed by Juan Ramon Blue MD

## 2022-09-13 NOTE — PROGRESS NOTES
[facility]    Gastroenterology Daily Progress Note      Patient:   Qasim Lange   :    1965   Facility:   [facility]  Date:     2022  Consultant:   LAILA Barcenas CNP, CNP      SUBJECTIVE  56 y.o. female admitted 2022 with DKA, type 1, not at goal [facility]) [E10.10]  DKA, type 2, not at goal [facility]) [E11.10]  Acute renal failure, unspecified acute renal failure type ([facility] [N17.9]  Diabetic ketoacidosis with coma associated with type 1 diabetes mellitus ([facility] [facility] [E10.11] and seen for anemia with coffee ground emesis. The pt was seen and examined. No c/o abdominal pain or nausea. hgb  7.1 tolerating a diet.  No BM overnight        OBJECTIVE  Scheduled Meds:   thiamine mononitrate  100 mg Oral Daily    folic acid  1 mg Oral Daily    iron sucrose  400 mg IntraVENous Once    OLANZapine zydis  5 mg Oral Nightly    pantoprazole (PROTONIX) 40 mg injection  40 mg IntraVENous BID    insulin lispro  0-4 Units SubCUTAneous TID WC    insulin lispro  0-4 Units SubCUTAneous Nightly    atorvastatin  40 mg Oral Nightly    budesonide-formoterol  2 puff Inhalation BID    insulin glargine  30 Units SubCUTAneous BID    [Held by provider] metFORMIN  1,000 mg Oral BID WC    mirtazapine  7.5 mg Oral Nightly    traZODone  150 mg Oral Nightly    venlafaxine  150 mg Oral Daily with breakfast    fluticasone  1 spray Each Nostril Daily       Vital Signs:  /60   Pulse 85   Temp 98.4 °F (36.9 °C) (Oral)   Resp 18   Ht 5' 5\" (1.651 m)   Wt 214 lb 8.1 oz (97.3 kg)   LMP  (LMP Unknown)   SpO2 93%   PF (!) 15 L/min   BMI 35.70 kg/m²      Physical Exam:       General Appearance: alert and oriented to person, place , well-developed and well-nourished, in no acute distress  Skin: warm and dry, no rash or erythema  Head: normocephalic and atraumatic  Eyes: pupils equal, round, and reactive to light, extraocular eye movements intact, conjunctivae normal  ENT: hearing grossly normal bilaterally  Neck: neck supple and non tender without mass, no thyromegaly or thyroid nodules, no cervical lymphadenopathy   Pulmonary/Chest: clear to auscultation bilaterally- no wheezes, rales or rhonchi, normal air movement, no respiratory distress  Cardiovascular: normal rate, regular rhythm, normal S1 and S2, no murmurs, rubs, clicks or gallops, distal pulses intact, no carotid bruits  Abdomen: soft, non-tender, non-distended, normal bowel sounds, no masses or organomegaly  Extremities: no cyanosis, clubbing or edema  Musculoskeletal: normal range of motion, no joint swelling, deformity or tenderness  Neurologic:oriented to person and place and muscle strength normal    Lab and Imaging Review     CBC  Recent Labs     09/11/22  0446 09/11/22  1104 09/11/22  1921 09/12/22  1527 09/13/22  1132   WBC 11.6*  --   --   --   --    HGB 8.1*   < > 8.4* 7.6* 7.1*   HCT 24.9*   < > 25.9* 23.6* 22.1*   MCV 95.0  --   --   --   --    *  --   --   --   --     < > = values in this interval not displayed. BMP  Recent Labs     09/11/22  0447 09/11/22  1104 09/12/22  2340 09/13/22  0630 09/13/22  1132   *   < > 141 142 139   K 3.1*   < > 4.0 3.5* 4.1   *   < > 111* 111* 109*   CO2 21   < > 23 24 21   BUN 28*  --   --   --   --    CREATININE 1.00*  --   --   --   --    GLUCOSE 89  --   --   --   --    CALCIUM 7.3*  --   --   --   --     < > = values in this interval not displayed. 9/10/22 11:27     Hep A IgM NONREACTIVE  NONREACTIVE   Hepatitis B Surface Ag NONREACTIVE  NONREACTIVE   Hepatitis C Ab NONREACTIVE  NONREACTIVE   Hep B Core Ab, IgM NONREACTIVE  NONREACTIVE      FINDINGS:liver us 9/10/22   LIVER:  The liver demonstrates normal echogenicity without evidence of   intrahepatic biliary ductal dilatation. Hepatopetal flow portal vein. Liver   18.3 cm in length. BILIARY SYSTEM:  Gallstones in the gallbladder. Negative sonographic   Gilliland's sign. Mild wall thickening at 4.6 mm.   No pericholecystic fluid. Common bile duct is within normal limits measuring 6.8 mm. RIGHT KIDNEY: The right kidney is grossly unremarkable without evidence of   hydronephrosis. PANCREAS:  Visualized portions of the pancreas are unremarkable. OTHER: No evidence of right upper quadrant ascites. Impression   Unremarkable ultrasound the liver. Cholelithiasis with mild gallbladder wall thickening. Negative sonographic   Gilliland's sign. No pericholecystic fluid. Common duct upper normal at 6.8 mm. No intraductal stone demonstrated. Correlation clinical findings and laboratory values required. ASSESSMENT/plan  Anemia and thrombocytopenia,  coffee ground emesis no further emesis   -continue ppi   Trend hh  Discussed egd with pt since her hgb is trending down and she stated that \"im going home today\" and didn't want to discuss topic any further     2.elevated lft with hx of drug abuse; us shows cholelithiasis, acute hep panel non reactive   3. MARTIN, improved hypernatremia mgt per nephrology     4. DKA resolved     5. AMS  psych following         Time spent reviewing chart assessing pt and d/w attending md around 30 minutes    This plan was formulated in collaboration with Dr. Conan Lundborg .     Electronically signed by: LAILA Rogers CNP on 9/13/2022 at 4:54 PM

## 2022-09-13 NOTE — PROGRESS NOTES
Transfer note     Patient is a 62-year-old female with past medical history of DM type II, polysubstance abuse, depression with psychotic features who was brought to ED with altered mental status for the management of DKA (BG was 1034) and severe hypernatremia (sodium was 161). Over the course of hospitalization DKA has resolved, sodium and BG are within normal range. Patient also noted to have coffee-ground emesis from her NG tube, her hemoglobin has been steadily declining since admission, this morning her hemoglobin is around 7.1. GI following, PPI started, upper endoscopy was deferred outpatient  She is alert and responsive but disoriented in place and time. She refuses to take medications and does not allow nurses to draw blood samples. She was seen by psychiatry on 9/10, said she is not a candidate for the [facility].   Resident's team is signing off

## 2022-09-14 ENCOUNTER — ANESTHESIA EVENT (OUTPATIENT)
Dept: ENDOSCOPY | Age: 57
DRG: 420 | End: 2022-09-14
Payer: COMMERCIAL

## 2022-09-14 LAB
ANION GAP SERPL CALCULATED.3IONS-SCNC: 6 MMOL/L (ref 9–17)
CHLORIDE BLD-SCNC: 112 MMOL/L (ref 98–107)
CO2: 25 MMOL/L (ref 20–31)
GLUCOSE BLD-MCNC: 124 MG/DL (ref 65–105)
GLUCOSE BLD-MCNC: 162 MG/DL (ref 65–105)
GLUCOSE BLD-MCNC: 254 MG/DL (ref 65–105)
GLUCOSE BLD-MCNC: 282 MG/DL (ref 65–105)
HCT VFR BLD CALC: 19.5 % (ref 36–46)
HEMOGLOBIN: 6.4 G/DL (ref 12–16)
MAGNESIUM: 1.4 MG/DL (ref 1.6–2.6)
PHOSPHORUS: 2.8 MG/DL (ref 2.6–4.5)
POTASSIUM SERPL-SCNC: 3.6 MMOL/L (ref 3.7–5.3)
SODIUM BLD-SCNC: 143 MMOL/L (ref 135–144)

## 2022-09-14 PROCEDURE — 94640 AIRWAY INHALATION TREATMENT: CPT

## 2022-09-14 PROCEDURE — 99211 OFF/OP EST MAY X REQ PHY/QHP: CPT

## 2022-09-14 PROCEDURE — 99232 SBSQ HOSP IP/OBS MODERATE 35: CPT | Performed by: PSYCHIATRY & NEUROLOGY

## 2022-09-14 PROCEDURE — C9113 INJ PANTOPRAZOLE SODIUM, VIA: HCPCS | Performed by: STUDENT IN AN ORGANIZED HEALTH CARE EDUCATION/TRAINING PROGRAM

## 2022-09-14 PROCEDURE — 86920 COMPATIBILITY TEST SPIN: CPT

## 2022-09-14 PROCEDURE — 6370000000 HC RX 637 (ALT 250 FOR IP): Performed by: STUDENT IN AN ORGANIZED HEALTH CARE EDUCATION/TRAINING PROGRAM

## 2022-09-14 PROCEDURE — 6370000000 HC RX 637 (ALT 250 FOR IP): Performed by: PSYCHIATRY & NEUROLOGY

## 2022-09-14 PROCEDURE — 36415 COLL VENOUS BLD VENIPUNCTURE: CPT

## 2022-09-14 PROCEDURE — 86901 BLOOD TYPING SEROLOGIC RH(D): CPT

## 2022-09-14 PROCEDURE — 86900 BLOOD TYPING SEROLOGIC ABO: CPT

## 2022-09-14 PROCEDURE — 84100 ASSAY OF PHOSPHORUS: CPT

## 2022-09-14 PROCEDURE — 6360000002 HC RX W HCPCS: Performed by: STUDENT IN AN ORGANIZED HEALTH CARE EDUCATION/TRAINING PROGRAM

## 2022-09-14 PROCEDURE — 6370000000 HC RX 637 (ALT 250 FOR IP): Performed by: NURSE PRACTITIONER

## 2022-09-14 PROCEDURE — 85018 HEMOGLOBIN: CPT

## 2022-09-14 PROCEDURE — 94761 N-INVAS EAR/PLS OXIMETRY MLT: CPT

## 2022-09-14 PROCEDURE — 6370000000 HC RX 637 (ALT 250 FOR IP)

## 2022-09-14 PROCEDURE — 2060000000 HC ICU INTERMEDIATE R&B

## 2022-09-14 PROCEDURE — 85014 HEMATOCRIT: CPT

## 2022-09-14 PROCEDURE — 6370000000 HC RX 637 (ALT 250 FOR IP): Performed by: INTERNAL MEDICINE

## 2022-09-14 PROCEDURE — 99232 SBSQ HOSP IP/OBS MODERATE 35: CPT | Performed by: INTERNAL MEDICINE

## 2022-09-14 PROCEDURE — A4216 STERILE WATER/SALINE, 10 ML: HCPCS | Performed by: STUDENT IN AN ORGANIZED HEALTH CARE EDUCATION/TRAINING PROGRAM

## 2022-09-14 PROCEDURE — 36430 TRANSFUSION BLD/BLD COMPNT: CPT

## 2022-09-14 PROCEDURE — 80051 ELECTROLYTE PANEL: CPT

## 2022-09-14 PROCEDURE — 86850 RBC ANTIBODY SCREEN: CPT

## 2022-09-14 PROCEDURE — 82947 ASSAY GLUCOSE BLOOD QUANT: CPT

## 2022-09-14 PROCEDURE — 2580000003 HC RX 258: Performed by: STUDENT IN AN ORGANIZED HEALTH CARE EDUCATION/TRAINING PROGRAM

## 2022-09-14 PROCEDURE — APPSS30 APP SPLIT SHARED TIME 16-30 MINUTES: Performed by: NURSE PRACTITIONER

## 2022-09-14 PROCEDURE — 83735 ASSAY OF MAGNESIUM: CPT

## 2022-09-14 PROCEDURE — P9016 RBC LEUKOCYTES REDUCED: HCPCS

## 2022-09-14 RX ORDER — OLANZAPINE 15 MG/1
7.5 TABLET, ORALLY DISINTEGRATING ORAL NIGHTLY
Status: DISCONTINUED | OUTPATIENT
Start: 2022-09-15 | End: 2022-09-19

## 2022-09-14 RX ORDER — SODIUM CHLORIDE 9 MG/ML
INJECTION, SOLUTION INTRAVENOUS PRN
Status: DISCONTINUED | OUTPATIENT
Start: 2022-09-14 | End: 2022-09-22 | Stop reason: HOSPADM

## 2022-09-14 RX ORDER — PREGABALIN 75 MG/1
75 CAPSULE ORAL 2 TIMES DAILY
Status: DISCONTINUED | OUTPATIENT
Start: 2022-09-14 | End: 2022-09-16

## 2022-09-14 RX ADMIN — Medication 2 PUFF: at 07:09

## 2022-09-14 RX ADMIN — ACETAMINOPHEN 650 MG: 325 TABLET, FILM COATED ORAL at 10:15

## 2022-09-14 RX ADMIN — PREGABALIN 75 MG: 75 CAPSULE ORAL at 21:25

## 2022-09-14 RX ADMIN — INSULIN GLARGINE 20 UNITS: 100 INJECTION, SOLUTION SUBCUTANEOUS at 21:25

## 2022-09-14 RX ADMIN — FOLIC ACID 1 MG: 1 TABLET ORAL at 09:49

## 2022-09-14 RX ADMIN — SODIUM CHLORIDE 40 MG: 9 INJECTION INTRAMUSCULAR; INTRAVENOUS; SUBCUTANEOUS at 21:25

## 2022-09-14 RX ADMIN — VENLAFAXINE HYDROCHLORIDE 150 MG: 150 CAPSULE, EXTENDED RELEASE ORAL at 09:49

## 2022-09-14 RX ADMIN — MIRTAZAPINE 7.5 MG: 15 TABLET, FILM COATED ORAL at 21:25

## 2022-09-14 RX ADMIN — OLANZAPINE 5 MG: 5 TABLET, ORALLY DISINTEGRATING ORAL at 02:00

## 2022-09-14 RX ADMIN — THIAMINE HCL TAB 100 MG 100 MG: 100 TAB at 09:49

## 2022-09-14 RX ADMIN — TRAZODONE HYDROCHLORIDE 150 MG: 100 TABLET ORAL at 21:24

## 2022-09-14 RX ADMIN — INSULIN LISPRO 2 UNITS: 100 INJECTION, SOLUTION INTRAVENOUS; SUBCUTANEOUS at 17:33

## 2022-09-14 RX ADMIN — SODIUM CHLORIDE 40 MG: 9 INJECTION INTRAMUSCULAR; INTRAVENOUS; SUBCUTANEOUS at 09:51

## 2022-09-14 RX ADMIN — INSULIN GLARGINE 20 UNITS: 100 INJECTION, SOLUTION SUBCUTANEOUS at 09:50

## 2022-09-14 RX ADMIN — ATORVASTATIN CALCIUM 40 MG: 40 TABLET, FILM COATED ORAL at 21:25

## 2022-09-14 RX ADMIN — PREGABALIN 75 MG: 75 CAPSULE ORAL at 13:23

## 2022-09-14 ASSESSMENT — PAIN SCALES - GENERAL
PAINLEVEL_OUTOF10: 2
PAINLEVEL_OUTOF10: 3

## 2022-09-14 ASSESSMENT — PAIN DESCRIPTION - LOCATION: LOCATION: ABDOMEN

## 2022-09-14 NOTE — PLAN OF CARE
Problem: Cardiovascular - Adult  Goal: Absence of cardiac dysrhythmias or at baseline  Outcome: Not Progressing  Note: Pt refusing to wear telemetry. Problem: Cardiovascular - Adult  Goal: Absence of cardiac dysrhythmias or at baseline  Outcome: Not Progressing  Note: Pt refusing to wear telemetry. Problem: Metabolic/Fluid and Electrolytes - Adult  Goal: Electrolytes maintained within normal limits  Outcome: Not Progressing  Note: Pt refused IV fluids and electrolyte replacement. Problem: Safety - Adult  Goal: Free from fall injury  Outcome: Progressing  Note: Pt a fall risk, bed locked in low position, call light within reach, bed alarm on, side rails up x2, fall signs posted, telesitter monitoring as pt can be impulsive.       Problem: Gastrointestinal - Adult  Goal: Minimal or absence of nausea and vomiting  Outcome: Progressing     Problem: Gastrointestinal - Adult  Goal: Maintains or returns to baseline bowel function  Outcome: Progressing     Problem: Genitourinary - Adult  Goal: Urinary catheter remains patent  Outcome: Progressing     Problem: Metabolic/Fluid and Electrolytes - Adult  Goal: Glucose maintained within prescribed range  Outcome: Progressing     Problem: Chronic Conditions and Co-morbidities  Goal: Patient's chronic conditions and co-morbidity symptoms are monitored and maintained or improved  Outcome: Progressing

## 2022-09-14 NOTE — ANESTHESIA PRE PROCEDURE
Department of Anesthesiology  Preprocedure Note       Name:  Diann Her   Age:  56 y.o.  :  1965                                          MRN:  [id number]         Date:  2022      Surgeon: Esdras [place]en[place]): Lino Phillips MD    Procedure: Procedure(s):  EGD BIOPSY    Medications prior to admission:   Prior to Admission medications    Medication Sig Start Date End Date Taking? Authorizing Provider   insulin glargine (LANTUS SOLOSTAR) 100 UNIT/ML injection pen Inject 30 Units into the skin 2 times daily 22   Jeff Michel MD   atorvastatin (LIPITOR) 40 MG tablet Take 1 tablet by mouth nightly 22  Jeff Michel MD   acetaminophen (TYLENOL) 500 MG tablet Take 2 tablets by mouth 3 times daily as needed for Pain  Patient not taking: Reported on 2022 3/15/22   Jeff Michel MD   fluticasone (FLONASE) 50 MCG/ACT nasal spray 1 spray by Each Nostril route daily 3/15/22   Jeff Michel MD   metFORMIN (GLUCOPHAGE) 1000 MG tablet Take 1 tablet by mouth 2 times daily (with meals) 3/15/22   Jeff Michel MD   mirtazapine (REMERON) 7.5 MG tablet Take 1 tablet by mouth nightly 3/15/22   Jeff Michel MD   traZODone (DESYREL) 150 MG tablet Take 1 tablet by mouth nightly 3/15/22   Jeff Michel MD   venlafaxine (EFFEXOR XR) 150 MG extended release capsule Take 1 capsule by mouth daily (with breakfast) 3/15/22   Jeff Michel MD   Alcohol Swabs 70 % PADS Use 1 swab 3 times daily as needed 3/15/22   Jeff Michel MD   blood glucose monitor strips Test 3 times a day & as needed for symptoms of irregular blood glucose. Dispense sufficient amount for indicated testing frequency plus additional to accommodate PRN testing needs.  3/15/22   Jeff Michel MD   Lancets MISC 1 each by Does not apply route 3 times daily 3/15/22   Jeff Michel MD   Insulin Pen Needle ([employer] PEN NEEDLES 31G) 31G X 8 MM MISC 1 each by Does not apply route daily 3/15/22   Jeff Michel MD   FREESTYLE LITE strip 1 each by Does not apply route 3 times daily 3/15/22   Edmar Alexander MD   budesonide-formoterol (SYMBICORT) 80-4.5 MCG/ACT AERO Inhale 2 puffs into the lungs 2 times daily 3/15/22   Edmar Alexander MD   albuterol sulfate  (90 Base) MCG/ACT inhaler Inhale 2 puffs into the lungs every 4 hours as needed for Wheezing 3/15/22 4/15/22  Edmar Alexander MD   mupirocin (BACTROBAN) 2 % ointment Apply topically 3 times daily Apply topically to right foot wound two times a day for wound care. Apply to right foot wound, cover with Kerlix and ace wrap. Historical Provider, MD   Misc.  Devices MISC 1 PAIR OF DIABETIC SHOES (1 LEFT/ 1 RIGHT)  1-3 PAIRS OF INSERTS (LEFT/ RIGHT) 2/15/22   Audrene Or, DPM   dicyclomine (BENTYL) 10 MG capsule Take 1 capsule by mouth 4 times daily 12/17/21   Edmar Alexander MD       Current medications:    Current Facility-Administered Medications   Medication Dose Route Frequency Provider Last Rate Last Admin    0.9 % sodium chloride infusion   IntraVENous PRN LAILA [place] CNP        pregabalin (LYRICA) capsule 75 mg  75 mg Oral BID Rosalba Ackerman MD   75 mg at 09/14/22 1323    thiamine mononitrate tablet 100 mg  100 mg Oral Daily Jose Macedo MD   100 mg at 85/26/79 9569    folic acid (FOLVITE) tablet 1 mg  1 mg Oral Daily Jose Macedo MD   1 mg at 09/14/22 0949    OLANZapine zydis (ZYPREXA) disintegrating tablet 5 mg  5 mg Oral Nightly Kat Marques MD   5 mg at 09/14/22 0200    haloperidol (HALDOL) tablet 1 mg  1 mg Oral Q6H PRN Rach Urbina MD        insulin glargine (LANTUS) injection vial 20 Units  20 Units SubCUTAneous BID LAILA Story CNP   20 Units at 09/14/22 0950    0.9 % sodium chloride infusion   IntraVENous Continuous Monique Albarran MD   Stopped at 09/13/22 1312    potassium chloride 10 mEq/100 mL IVPB (Peripheral Line)  10 mEq IntraVENous PRN Adriana De La Cruz MD   Stopped at 09/13/22 1875    magnesium sulfate 1000 mg in dextrose 5% 100 mL IVPB  1,000 mg IntraVENous PRN Alhaji Leavitt MD   Stopped at 22    acetaminophen (TYLENOL) tablet 650 mg  650 mg Oral Q4H PRN Aracelis Kay MD   650 mg at 22 1015    pantoprazole (PROTONIX) 40 mg in sodium chloride (PF) 10 mL injection  40 mg IntraVENous BID Parul Mcneill MD   40 mg at 22 0951    insulin lispro (HUMALOG) injection vial 0-4 Units  0-4 Units SubCUTAneous TID  Parul Mcneill MD   1 Units at 09/10/22 1652    insulin lispro (HUMALOG) injection vial 0-4 Units  0-4 Units SubCUTAneous Nightly Parul Mcneill MD        glucose chewable tablet 16 g  4 tablet Oral PRN Parul Mcneill MD        dextrose bolus 10% 125 mL  125 mL IntraVENous PRN Parul Mcneill MD        Or    dextrose bolus 10% 250 mL  250 mL IntraVENous PRN Parul Mcneill MD        dextrose 10 % infusion   IntraVENous Continuous PRN Parul Mcneill MD        glucagon (rDNA) injection 1 mg  1 mg SubCUTAneous PRN Yodit Kern MD        dextrose 10 % infusion   IntraVENous Continuous PRN Yodit Kern MD        albuterol sulfate HFA (PROVENTIL;VENTOLIN;PROAIR) 108 (90 Base) MCG/ACT inhaler 2 puff  2 puff Inhalation Q4H PRN Jose Macedo MD        atorvastatin (LIPITOR) tablet 40 mg  40 mg Oral Nightly Jose Macedo MD   40 mg at 22    budesonide-formoterol (SYMBICORT) 80-4.5 MCG/ACT inhaler 2 puff  2 puff Inhalation BID Karoline Apgar, MD   2 puff at 22 0709    [Held by provider] metFORMIN (GLUCOPHAGE) tablet 1,000 mg  1,000 mg Oral BID  Jose Macedo MD        mirtazapine (REMERON) tablet 7.5 mg  7.5 mg Oral Nightly Jose Macedo MD   7.5 mg at 22    traZODone (DESYREL) tablet 150 mg  150 mg Oral Nightly Jose Macedo MD   150 mg at 22    venlafaxine (EFFEXOR XR) extended release capsule 150 mg  150 mg Oral Daily with breakfast Jose Macedo MD   150 mg at 22 0949    fluticasone (FLONASE) 50 MCG/ACT nasal spray 1 spray  1 spray Each Nostril Daily Jose Macedo MD   1 spray at 09/11/22 0901    sodium phosphate 10 mmol in sodium chloride 0.9 % 250 mL IVPB  10 mmol IntraVENous PRN Emilie Chawla MD   Stopped at 09/13/22 1311    Or    sodium phosphate 15 mmol in dextrose 5 % 250 mL IVPB  15 mmol IntraVENous PRN Emilie Chawla MD   Stopped at 09/13/22 0125    Or    sodium phosphate 20 mmol in dextrose 5 % 500 mL IVPB  20 mmol IntraVENous PRN Jose Macedo MD        polyethylene glycol (GLYCOLAX) packet 17 g  17 g Oral Daily PRN Emilie Chawla MD           Allergies: Allergies   Allergen Reactions    Bactrim [Sulfamethoxazole-Trimethoprim] Swelling    Adhesive Tape Rash       Problem List:    Patient Active Problem List   Diagnosis Code    IDDM (insulin dependent diabetes mellitus) [id number]    Lupus ([facility] M32.9    Post traumatic stress disorder (PTSD) F43.10    Acute cystitis with hematuria N30.01    Hyperglycemia due to diabetes mellitus ([facility] [address] E11.65    Suicidal ideation R45.851    Arthritis M19.90    Chronic back pain M54.9, G89.29    Acid reflux K21.9    History of stroke Z86.73    Polysubstance abuse ([facility]) F19.10    Cocaine abuse ([facility]) F14.10    Chest pain R07.9    Acute non-recurrent maxillary sinusitis J01.00    Acute respiratory failure with hypercapnia ([facility]) J96.02    Alcohol use disorder, severe, dependence ([facility]) F10.20    Asthma exacerbation attacks J45. 901    Bilateral edema of lower extremity R60.0    Bipolar 1 disorder, depressed, severe ([facility]) F31.4    BMI 34.0-34.9,adult Z68.34    Cannabis use disorder, severe, dependence ([facility]) F12.20    Cocaine use disorder, severe, dependence ([facility]) F14.20    Dizziness R42    Dyslipidemia E78.5    Family history of colon cancer Z80.0    History of lupus [id number]    Homicidal ideation R45.850    Hypotension I95.9    Neuropathy G62.9    Absolute anemia D64.9    Recurrent depression ([facility]) F33.9    Recurrent major depressive disorder, in partial remission Uremic encephalopathy G93.49, N19    Unspecified mood (affective) disorder (HCC) F39    Coffee ground emesis K92.0    Elevated LFTs R79.89    Diabetic ketoacidosis with coma associated with type 1 diabetes mellitus ([address] E10.11       Past Medical History:        Diagnosis Date    Acid reflux 5/29/2017    Acute cystitis with hematuria 10/11/2016    Acute non-recurrent maxillary sinusitis 11/28/2017    Asthma     Bipolar 1 disorder ([address] 1/4/2018    Bipolar disorder, mixed ([address]     BMI 34.0-34.9,adult 11/28/2017    Cannabis use disorder, severe, dependence ([address] 12/19/2017    Cerebrovascular accident (CVA) ([address] 6/14/2017    Chest pain 11/5/2016    Chronic renal insufficiency     Cocaine abuse ([address] 1/5/2018    COVID-19 virus RNA test result unknown     DDD (degenerative disc disease), cervical     Diabetes mellitus ([address]     Dizziness 6/13/2017    Fibromyalgia     History of stroke 9/9/2017    Homicidal ideation 11/6/2017    Hyperglycemia     Hypertension     Hypotension 1/18/2019    IDDM (insulin dependent diabetes mellitus) 12/21/2015    Lupus ([address]     Migraine     Neuropathy     Neuropathy     Polysubstance abuse ([address] 9/17/2017    Post traumatic stress disorder (PTSD)     Posttraumatic stress disorder 5/29/2017    Recurrent depression ([address] 5/29/2017    Recurrent major depressive disorder, in partial remission ([address] 11/28/2017    Screening mammogram, encounter for 11/28/2017    Severe recurrent major depression with psychotic features ([address] 12/19/2017    Severe recurrent major depression without psychotic features ([address] 12/19/2017    Stroke (cerebrum) ([address] 6/14/2017    Stroke ([address]     per chart notes    Suicidal ideation     Suicidal intent 3/10/2017    Vitamin D deficiency 11/28/2017    White matter changes 6/13/2017       Past Surgical History:        Procedure Laterality Date    ABDOMEN SURGERY      drain tube    ABSCESS DRAINAGE      right buttock    CATARACT REMOVAL WITH IMPLANT Bilateral     CHEST TUBE INSERTION      FINGER AMPUTATION Left 03/13/2021    LEFT RING FINER AMPUTATION performed by Cindy Mckee MD at [facility] Right 10/11/2021    AMPUTATION RIGHT INDEX FINGER performed by Cindy Mckee MD at [address] [place] [address] Right 1/27/2022    FOOT ABSCESS INCISION AND DRAINAGE  (PULSE LAVAGE, CULTURE SWABS) performed by Jacob Mendoza DPM at [place] [address]. Right 01/27/2022    FOOT ABSCESS INCISION AND DRAINAGE (PULSE LAVAGE, CULTURE SWABS) - Right    HAND SURGERY Right 09/14/2021    I&D INDEX FINGER performed by Cindy Mckee MD at [address] [place] [address] Right 09/15/2021    I&D INDEX FINGER performed by Cindy Mckee MD at [place] [address]  2/3/2022         LASIK Bilateral        Social History:    Social History     Tobacco Use    Smoking status: Never    Smokeless tobacco: Never   Substance Use Topics    Alcohol use: Not Currently                                Counseling given: Not Answered      Vital Signs (Current):   Vitals:    09/14/22 1330 09/14/22 1615 09/14/22 1648 09/14/22 1800   BP: 107/74 (!) 100/57 (!) 104/58 132/78   Pulse: 96 98 98 95   Resp: 18 18 20 20   Temp: 97.8 °F (36.6 °C) 99.4 °F (37.4 °C) 99.2 °F (37.3 °C) 99 °F (37.2 °C)   TempSrc: Oral Oral Oral Oral   SpO2: 99% 100% 98% 100%   Weight:       Height:       PF:                                                  BP Readings from Last 3 Encounters:   09/14/22 132/78   08/17/22 (!) 141/122   08/04/22 89/77       NPO Status:                                                                                 BMI:   Wt Readings from Last 3 Encounters:   09/13/22 214 lb 8.1 oz (97.3 kg)   08/03/22 240 lb (108.9 kg)   04/22/22 236 lb (107 kg)     Body mass index is 35.7 kg/m².     CBC:   Lab Results   Component Value Date/Time    WBC 11.6 09/11/2022 04:46 AM    RBC 2.63 09/11/2022 04:46 AM    HGB 6.4 09/14/2022 05:46 AM    HCT 19.5 09/14/2022 05:46 AM    MCV 95.0 09/11/2022 04:46 AM    RDW 13.3 09/11/2022 04:46 AM     09/11/2022 04:46 AM       CMP:   Lab Results   Component Value Date/Time     09/14/2022 05:46 AM    K 3.6 09/14/2022 05:46 AM     09/14/2022 05:46 AM    CO2 25 09/14/2022 05:46 AM    BUN 28 09/11/2022 04:47 AM    CREATININE 1.00 09/11/2022 04:47 AM    GFRAA >60 09/11/2022 04:47 AM    LABGLOM 58 09/11/2022 04:47 AM    GLUCOSE 89 09/11/2022 04:47 AM    PROT 8.9 09/08/2022 11:24 AM    CALCIUM 7.3 09/11/2022 04:47 AM    BILITOT 0.4 09/08/2022 11:24 AM    ALKPHOS 151 09/08/2022 11:24 AM    AST 11 09/08/2022 11:24 AM    ALT 15 09/08/2022 11:24 AM       POC Tests:   Recent Labs     09/14/22  1622   POCGLU 282*       Coags:   Lab Results   Component Value Date/Time    PROTIME 10.7 08/04/2022 12:21 AM    INR 1.0 08/04/2022 12:21 AM    APTT 23.2 08/04/2022 12:21 AM       HCG (If Applicable): No results found for: PREGTESTUR, PREGSERUM, HCG, HCGQUANT     ABGs:   Lab Results   Component Value Date/Time    PHART 7.365 09/09/2022 11:05 AM    PO2ART 76.4 09/09/2022 11:05 AM    TXQ5GAT 31.6 09/09/2022 11:05 AM    XGL6NQU 18.0 09/09/2022 11:05 AM    N8ZPGJDC 94.2 09/09/2022 11:05 AM        Type & Screen (If Applicable):  No results found for: LABABO, LABRH    Drug/Infectious Status (If Applicable):  Lab Results   Component Value Date/Time    HEPCAB NONREACTIVE 09/10/2022 11:27 AM       COVID-19 Screening (If Applicable):   Lab Results   Component Value Date/Time    COVID19 Not Detected 01/22/2022 11:03 PM           Anesthesia Evaluation  Patient summary reviewed and Nursing notes reviewed no history of anesthetic complications:   Airway: Mallampati: II  TM distance: >3 FB   Neck ROM: full  Mouth opening: > = 3 FB   Dental:          Pulmonary:normal exam  breath sounds clear to auscultation  (+) asthma:                            Cardiovascular:    (+) hypertension:,         Rhythm: regular  Rate: normal                    Neuro/Psych:   (+) CVA:, neuromuscular disease:, headaches: migraine headaches, psychiatric history:depression/anxiety              ROS comment: PTSD GI/Hepatic/Renal:   (+) GERD:,           Endo/Other:    (+) DiabetesType II DM, using insulin, : arthritis: OA., .                  ROS comment: was found unresponsive at her home where she lives alone. - sent to the ER on 9/8/22 - hypotensive, hyperglycemic Blood glucose was greater than (1000 mg/dL)and confused    H/o cannabis and cocaine abuse Abdominal:             Vascular: Other Findings:           Anesthesia Plan      general     ASA 3     (R/b/a of anesthesia d/w patient and she is agreeable to proceed)  Induction: intravenous. MIPS: Prophylactic antiemetics administered. Anesthetic plan and risks discussed with patient. Plan discussed with CRNA.                     Leora Bowling MD   9/14/2022

## 2022-09-14 NOTE — PROGRESS NOTES
[address]    PROGRESS NOTE             9/14/2022    3:53 PM    Name:   Elen Hill  MRN:     [id number]     Acct:      [de-identified]   Room:   2112/2112-01  IP Day:  6  Admit Date:  9/8/2022 11:01 AM    PCP:  Elsy Glez MD  Code Status:  Full Code    Subjective:     C/C:   Chief Complaint   Patient presents with    Hyperglycemia    Altered Mental Status     Interval History Status: improved. Patient was seen and evaluated at bedside. Today electrolyte panel is within normal range except for potassium of 3.5, replacement protocol was initiated. This morning BG is 69. As per nurse, she is uncooperative and refuses to take her medications. Brief History:     The patient is a 56 y.o. Non- / non  female with a history of asthma, bipolar 1 disorder, hypertension, stroke, polysubstance abuse, DM 2, degenerative disc disease, and depression with psychotic features, who presented to the ED with altered mental status. The patient was found unresponsive at her home where she lives alone. The patient had not been taking her insulin as her refrigerator was filled with unused insulin vials. The patients family had not heard from the patient for several days and EMS was called. The patient was found confused and obtunded, hypotensive, and hyperglycemic. The ED, the patient was hypotensive (BP 86/64) and tachypneic (RR 24). Blood glucose was 1,034. Serum potassium was 5.5, bicarbonate was 19 mmol/L [anion gap 25 mmol/L). Cr was 3.56 (baseline Cr 0.73). Levophed was administered. The patient was admitted for management of DKA and DKA protocol was initiated    Review of Systems:     Reason unable to perform ROS: Pt is uncooperative      Medications: Allergies:     Allergies   Allergen Reactions    Bactrim [Sulfamethoxazole-Trimethoprim] Swelling    Adhesive Tape Rash       Current Meds:   Scheduled Meds: pregabalin  75 mg Oral BID    thiamine mononitrate  100 mg Oral Daily    folic acid  1 mg Oral Daily    OLANZapine zydis  5 mg Oral Nightly    insulin glargine  20 Units SubCUTAneous BID    pantoprazole (PROTONIX) 40 mg injection  40 mg IntraVENous BID    insulin lispro  0-4 Units SubCUTAneous TID WC    insulin lispro  0-4 Units SubCUTAneous Nightly    atorvastatin  40 mg Oral Nightly    budesonide-formoterol  2 puff Inhalation BID    [Held by provider] metFORMIN  1,000 mg Oral BID     mirtazapine  7.5 mg Oral Nightly    traZODone  150 mg Oral Nightly    venlafaxine  150 mg Oral Daily with breakfast    fluticasone  1 spray Each Nostril Daily     Continuous Infusions:    sodium chloride      sodium chloride Stopped (09/13/22 1312)    dextrose      dextrose       PRN Meds: sodium chloride, haloperidol, potassium chloride, magnesium sulfate, acetaminophen, glucose, dextrose bolus **OR** dextrose bolus, dextrose, glucagon (rDNA), dextrose, albuterol sulfate HFA, sodium phosphate IVPB **OR** sodium phosphate IVPB **OR** sodium phosphate IVPB, polyethylene glycol    Data:     Past Medical History:   has a past medical history of Acid reflux, Acute cystitis with hematuria, Acute non-recurrent maxillary sinusitis, Asthma, Bipolar 1 disorder ([address] Bipolar disorder, mixed ([address] BMI 34.0-34.9,adult, Cannabis use disorder, severe, dependence ([address] Cerebrovascular accident (CVA) ([address] Chest pain, Chronic renal insufficiency, Cocaine abuse ([address] COVID-19 virus RNA test result unknown, DDD (degenerative disc disease), cervical, Diabetes mellitus ([address] Dizziness, Fibromyalgia, History of stroke, Homicidal ideation, Hyperglycemia, Hypertension, Hypotension, IDDM (insulin dependent diabetes mellitus), Lupus ([address] Migraine, Neuropathy, Neuropathy, Polysubstance abuse ([address] Post traumatic stress disorder (PTSD), Posttraumatic stress disorder, Recurrent depression ([address] Recurrent major depressive disorder, in partial remission ([facility] Screening mammogram, encounter for, Severe recurrent major depression with psychotic features ([facility] Severe recurrent major depression without psychotic features ([facility] Stroke (cerebrum) ([facility] Stroke ([facility] Suicidal ideation, Suicidal intent, Vitamin D deficiency, and White matter changes. Social History:   reports that she has never smoked. She has never used smokeless tobacco. She reports that she does not currently use alcohol. She reports current drug use. Drugs: Marijuana (Weed) and Cocaine. Family History:   Family History   Problem Relation Age of Onset    Diabetes Mother     Stroke Mother     Diabetes Father     Diabetes Sister     Diabetes Brother     Other Son         GSW    No Known Problems Sister        Vitals:  /74   Pulse 96   Temp 97.8 °F (36.6 °C) (Oral)   Resp 18   Ht 5' 5\" (1.651 m)   Wt 214 lb 8.1 oz (97.3 kg)   LMP  (LMP Unknown)   SpO2 99%   PF (!) 15 L/min   BMI 35.70 kg/m²   Temp (24hrs), Av.4 °F (36.9 °C), Min:97.8 °F (36.6 °C), Max:99 °F (37.2 °C)    Recent Labs     22  1628 22  1931 22  0546 22  1112   POCGLU 180* 171* 124* 162*         I/O(24Hr):     Intake/Output Summary (Last 24 hours) at 2022 1553  Last data filed at 2022 1339  Gross per 24 hour   Intake 360 ml   Output 2500 ml   Net -2140 ml         Labs:    [unfilled]    Lab Results   Component Value Date/Time    SPECIAL NOT REPORTED 2022 12:47 PM     Lab Results   Component Value Date/Time    CULTURE NO GROWTH 5 DAYS 2022 12:18 PM       [unfilled]    Radiology:    ECHO Complete 2D W Doppler W Color    Result Date: 9/10/2022  [address] [facility] Transthoracic Echocardiography Report (TTE)  Patient Name Danita Quivers    Date of Study               2022               [facility]   Date of      1965  Gender                      Female  Birth   Age          56 year(s)  Race                        Black   Room Number          Height: 65 inch, 165.1 cm   Corporate ID [id number]    Weight:                     180 pounds, 81.6 kg  #   Patient Acct [de-identified]   BSA:          1.89 m^2      BMI:      29.95  #                                                              kg/m^2   MR #         [id number]      Sonographer                 Carina Newsome   Accession #  [id number]  Interpreting Physician      [address]   Fellow                   Referring Nurse                           Practitioner   Interpreting             Referring Physician  Fellow  Type of Study   TTE procedure:2D Echocardiogram, M-Mode, Doppler, Color Doppler. Procedure Date Date: 09/09/2022 Start: 12:50 PM Study Location: [facility] Technical Quality: Fair visualization Indications:Tachycardia. Patient Status: Inpatient Height: 65 inches Weight: 180 pounds BSA: 1.89 m^2 BMI: 29.95 kg/m^2 Rhythm: Sinus tachycardia HR: 108 bpm BP: 98/55 mmHg CONCLUSIONS Summary Left ventricle is normal in size and wall thickness. Global left ventricular systolic function is normal. Estimated LV EF 60-65 %. No obvious wall motion abnormality seen. Left atrium is normal in size. No significant valvular regurgitation or stenosis seen. Signature ----------------------------------------------------------------------------  Electronically signed by Carina Newsome(Sonographer) on 09/09/2022 02:20  PM ---------------------------------------------------------------------------- ----------------------------------------------------------------------------  Electronically signed by Kris,Suleiman(Interpreting physician) on 09/10/2022  08:12 AM ---------------------------------------------------------------------------- FINDINGS Left Atrium Left atrium is normal in size. Left Ventricle Left ventricle is normal in size and wall thickness. Global left ventricular systolic function is normal. Estimated LV EF 60-65 %. No obvious wall motion abnormality seen.  Right Atrium Right atrium is normal in size. Right Ventricle Normal right ventricular size and function. Mitral Valve No obvious valvular abnormality seen. No evidence of mitral regurgitation. Aortic Valve No obvious valvular abnormality seen. No evidence of aortic insufficiency or stenosis. Tricuspid Valve Tricuspid valve was not well visualized. Insignificant tricuspid regurgitation, unable to estimate RVSP. Pulmonic Valve Pulmonic valve was not well visualized. No evidence of pulmonic insufficiency or stenosis. Pericardial Effusion No significant pericardial effusion is seen. Pleural Effusion No pleural effusion seen. Miscellaneous Normal aortic root dimension. IVC not well visualized. E/E' average = 8.5.  M-mode / 2D Measurements & Calculations:   LVIDd:3.79 cm(3.7 - 5.6 cm)      Diastolic UPPMMJ:66.0 ml  VZUVQ:2.39 cm(2.2 - 4.0 cm)      Systolic JPQZWJ:3.87 ml  TAQO:6.51 cm(0.6 - 1.1 cm)       Aortic Root:3.6 cm(2.0 - 3.7 cm)  LVPWd:0.7 cm(0.6 - 1.1 cm)       LA Dimension: 2.3 cm(1.9 - 4.0 cm)  Fractional Shortenin.55 %    LA volume/Index: 33.7 ml /18m^2  Calculated LVEF (%): 86.38 %     LVOT:1.7 cm   Mitral:                              Aortic   Peak E-Wave: 0.75 m/s                Peak Velocity: 1.51 m/s  Peak A-Wave: 1.15 m/s                Mean Velocity: 0.86 m/s  E/A Ratio: 0.65                      Peak Gradient: 9.12 mmHg  Peak Gradient: 2.26 mmHg             Mean Gradient: 4 mmHg  Deceleration Time: 130 msec                                        Area (continuity): 2.26 cm^2                                       AV VTI: 20.6 cm  Septal Wall E' velocity:0.07 m/s Lateral Wall E' velocity:0.12 m/s    XR ABDOMEN (KUB) (SINGLE AP VIEW)    Result Date: 2022  EXAMINATION: ONE SUPINE XRAY VIEW(S) OF THE ABDOMEN 2022 9:33 am COMPARISON: 2022, 2022 HISTORY: ORDERING SYSTEM PROVIDED HISTORY: NGT placement, dark liquid coming through NG TECHNOLOGIST PROVIDED HISTORY: NGT placement, dark liquid coming through NG Reason for Exam: NGT placement, dark liquid coming through NG FINDINGS: Enteric tube coiled in the body of the stomach. Nonobstructive bowel gas pattern. Mild stool burden. Multiple external devices project over the patient. No acute osseous abnormality identified. Enteric tube coiled at the level of the body of the stomach. Nonobstructive bowel gas pattern. CT HEAD WO CONTRAST    Result Date: 9/8/2022  EXAMINATION: CT OF THE HEAD WITHOUT CONTRAST  9/8/2022 11:38 am TECHNIQUE: CT of the head was performed without the administration of intravenous contrast. Automated exposure control, iterative reconstruction, and/or weight based adjustment of the mA/kV was utilized to reduce the radiation dose to as low as reasonably achievable. COMPARISON: MRI 08/04/2022, 08/03/2022 and CT 08/03/2022. HISTORY: ORDERING SYSTEM PROVIDED HISTORY: Mental Status Changes TECHNOLOGIST PROVIDED HISTORY: Mental Status Changes Decision Support Exception - unselect if not a suspected or confirmed emergency medical condition->Emergency Medical Condition (MA) Is the patient pregnant?->No Reason for Exam: AMS Additional signs and symptoms: AMS FINDINGS: BRAIN/VENTRICLES: There is no acute intracranial hemorrhage, mass effect or midline shift. No abnormal extra-axial fluid collection. Encephalomalacia in the left frontal lobe demonstrated corresponding to recently demonstrated ischemia. Cortical atrophy and chronic white matter changes in the brain and associated ventricular enlargement are again demonstrated. ORBITS: The visualized portion of the orbits demonstrate no acute abnormality. SINUSES: The visualized paranasal sinuses and mastoid air cells demonstrate no acute abnormality. SOFT TISSUES/SKULL:  No acute abnormality of the visualized skull or soft tissues. Chronic findings in the brain without acute CT abnormality identified. Expected evolution of recently demonstrated ischemia in the left frontal lobe.      [address] LIVER    Result Date: 9/10/2022  EXAMINATION: RIGHT UPPER QUADRANT ULTRASOUND 9/10/2022 11:21 am COMPARISON: None. HISTORY: ORDERING SYSTEM PROVIDED HISTORY: elevated lft TECHNOLOGIST PROVIDED HISTORY: elevated lft FINDINGS: LIVER:  The liver demonstrates normal echogenicity without evidence of intrahepatic biliary ductal dilatation. Hepatopetal flow portal vein. Liver 18.3 cm in length. BILIARY SYSTEM:  Gallstones in the gallbladder. Negative sonographic Gilliland's sign. Mild wall thickening at 4.6 mm. No pericholecystic fluid. Common bile duct is within normal limits measuring 6.8 mm. RIGHT KIDNEY: The right kidney is grossly unremarkable without evidence of hydronephrosis. PANCREAS:  Visualized portions of the pancreas are unremarkable. OTHER: No evidence of right upper quadrant ascites. Unremarkable ultrasound the liver. Cholelithiasis with mild gallbladder wall thickening. Negative sonographic Gilliland's sign. No pericholecystic fluid. Common duct upper normal at 6.8 mm. No intraductal stone demonstrated. Correlation clinical findings and laboratory values required. XR CHEST PORTABLE    Result Date: 9/8/2022  EXAMINATION: ONE XRAY VIEW OF THE CHEST 9/8/2022 7:53 pm COMPARISON: 08/17/2022 HISTORY: ORDERING SYSTEM PROVIDED HISTORY: line placement TECHNOLOGIST PROVIDED HISTORY: line placement Reason for Exam: Line placement FINDINGS: Central venous catheter tip overlies the right atrium. No pleural effusion or pneumothorax. Heart size is at the upper limits of normal.     Right central venous catheter tip overlies the right atrium. No pneumothorax. XR CHEST PORTABLE    Result Date: 8/17/2022  EXAMINATION: ONE XRAY VIEW OF THE CHEST 8/17/2022 8:09 pm COMPARISON: None. HISTORY: ORDERING SYSTEM PROVIDED HISTORY: chest pain TECHNOLOGIST PROVIDED HISTORY: chest pain FINDINGS: Chest radiograph:  The cardiomediastinal silhouette and hilar contours are normal. The lungs are clear with no focal consolidation, pleural effusion or pneumothorax. The overlying soft tissue and osseous structures do not demonstrate acute abnormality. No acute intrathoracic pathology. US RETROPERITONEAL COMPLETE    Result Date: 9/8/2022  EXAMINATION: RETROPERITONEAL ULTRASOUND OF THE KIDNEYS AND URINARY BLADDER 9/8/2022 COMPARISON: None HISTORY: ORDERING SYSTEM PROVIDED HISTORY: Acute kidney injury TECHNOLOGIST PROVIDED HISTORY: Acute kidney injury 57-year-old female with acute kidney injury FINDINGS: Kidneys: Right kidney measures 10.4 x 4.3 x 5.0 cm. Right renal cortical thickness measures 1.7 cm. Left kidney measures 9.4 x 4.8 x 4.8 cm. Left renal cortical thickness measures 1.8 cm. No hydronephrosis or perinephric fluid. No echogenic foci with posterior acoustic shadowing to suggest renal calculi. Gross preservation of the bilateral corticomedullary differentiation. Bladder: Not imaged. Unremarkable renal ultrasound. No hydronephrosis. Physical Examination:        Constitutional:       Appearance: She is ill-appearing. HENT:      Head: Normocephalic and atraumatic. Nose: No congestion or rhinorrhea. Eyes:      Extraocular Movements: Extraocular movements intact. Conjunctiva/sclera: Conjunctivae normal.      Pupils: Pupils are equal, round, and reactive to light. Cardiovascular:      Rate and Rhythm: Tachycardia present. Pulses: Normal pulses. Heart sounds: Normal heart sounds. No murmur heard. No friction rub. No gallop. Pulmonary:      Effort: Pulmonary effort is normal. No respiratory distress. Breath sounds: Normal breath sounds. No wheezing or rales. Abdominal:      General: Abdomen is flat. Bowel sounds are normal. There is no distension. Palpations: There is no mass. Tenderness: There is no guarding. Musculoskeletal:      Right lower leg: No edema. Left lower leg: No edema.    Skin:     Capillary Refill: Capillary refill takes less than 2 seconds. Neurological:      General: No focal deficit present. Mental Status: She is alert but confused and disoriented. Sensory: No sensory deficit. Motor: No weakness. Psychiatric: was not accessed due to altered mental status      Assessment:        Primary Problem  DKA, type 1, not at goal [facility])    Active Hospital Problems    Diagnosis Date Noted    Diabetic ketoacidosis with coma associated with type 1 diabetes mellitus ([facility] [E10.11] 09/13/2022     Priority: Medium    Coffee ground emesis [K92.0] 09/11/2022     Priority: Medium    Elevated LFTs [R79.89] 09/11/2022     Priority: Medium    Unspecified mood (affective) disorder ([facility] Tonny Goldmann 09/10/2022     Priority: Medium    Uremic encephalopathy [G93.49, N19] 09/09/2022     Priority: Medium    DKA, type 1, not at goal [facility]) [E10.10] 09/08/2022     Priority: Medium    DKA, type 2, not at goal [facility]) [E11.10] 09/08/2022     Priority: Medium    Cocaine abuse ([facility] [F14.10] 01/05/2018       Plan:        DKA, 2/2 poor insulin compliance, resolving  - Glucose on admssion: 1,034; glucose today 80  - BMP q4h, initial Mag and Phos levels  - Glucose checks 4 times daily  - Home Lantus 30 units BID   - Home metformin on hold  -IV Normal Saline at 250 mL/hr  -Hypoglycemia, phos and potassium replacement protocols in place   -General surgery placed central line      Acute Kidney Injury pre-renal azotemia secondary to dehydration  -Baseline Cr 0.73;  Cr on admission: 3.56; Cr today 1.00  -Change IV NS infusion to 0.45 NS at 150 ml/hr  -BMP q4 hours  -monitor urine output  -Nephrology consulted      Suspicion for upper GI bleeding  -Coffee-ground emesis from his NG tube, eventually become bilious  -Globin had been steadily climbing since admission, currently is stable around 8  -GI on board  -PPI started  -Upper endoscopy is planned once electrolyte abnormalities resolve     Hypotension secondary to dehydration by osmotic diuresis  -BP today:113/61  -Free water deficit 10 L  -Continue NS infusion     Hypernatremia  -most recent Na+ : 147;   -Continue IV NS infusion to 0.45 NS at 150 ml/hr      Acute metabolic encephalopathy s/s to electrolyte abnormality  -Restraints in place; patient oriented to person only  -Nephrology on board; electrolyte replacement  -EEG ordered    -Restraints placed     Polysubstance abuse  -Urine tox positive for Fentanyl and cocaine  -Psychiatry consulted      Ventricular tachycardia  -Non-sustained VT secondary to electrolye imbalance, metabolic derangement and cocaine  -Continue to manage hyperglycemia and electrolytes  -Discontinue metoprolol  -Echo: Estimated LV EF 60-65        Depression with psychotic features  -Continue Haloperidol lactate 5 mg IM q 6 hours PRN  -Continue Mirtazapine 7.5 mg po nightly  -Continue Trazodone 150 mg po nightly  -Continue Venlafaxine 150 mg po daily  -Psychiatry consulted     Asthma  -Continue home Proventil ventolin 2 puff q 4 hours as needed   -Continue home Symbicort 2 puffs BID  -Continue home Flonase 1 spray each nostril daily      Code: Full code  Diet: Adult diet, 3 carbs  DVT prophylaxis: on hold        Sept 14 ,  Pt c/o pain today   Hb very low, getting transfused   GI seen her, EGD next am   Discharge plan to [facility] not a candidate for [facility] per psychiatrist  Acute on chronic anemia with some coffee-ground vomit but no significant drop will do Protonix  IV Venofer 1 dose  400 mg  Psych to comment on competency         Jennifer King MD  9/14/2022  3:53 PM

## 2022-09-14 NOTE — PLAN OF CARE
Problem: Discharge Planning  Goal: Discharge to home or other facility with appropriate resources  Outcome: Progressing     Problem: Pain  Goal: Verbalizes/displays adequate comfort level or baseline comfort level  Outcome: Progressing     Problem: Skin/Tissue Integrity  Goal: Absence of new skin breakdown  Description: 1. Monitor for areas of redness and/or skin breakdown  2. Assess vascular access sites hourly  3. Every 4-6 hours minimum:  Change oxygen saturation probe site  4. Every 4-6 hours:  If on nasal continuous positive airway pressure, respiratory therapy assess nares and determine need for appliance change or resting period. Outcome: Progressing     Problem: ABCDS Injury Assessment  Goal: Absence of physical injury  Outcome: Progressing     Problem: Safety - Adult  Goal: Free from fall injury  9/14/2022 1635 by Omari Rahman RN  Outcome: Progressing     Problem: Cardiovascular - Adult  Goal: Absence of cardiac dysrhythmias or at baseline  9/14/2022 1635 by Omari Rahman RN  Outcome: Progressing  9/14/2022 0315 by Nick Edwards RN  Outcome: Not Progressing  Note: Pt refusing to wear telemetry. Problem: Metabolic/Fluid and Electrolytes - Adult  Goal: Electrolytes maintained within normal limits  9/14/2022 1635 by Omari Rhaman RN  Outcome: Progressing  9/14/2022 0315 by Nick Edwards RN  Outcome: Not Progressing  Note: Pt refused IV fluids and electrolyte replacement.

## 2022-09-14 NOTE — PROGRESS NOTES
[facility]  Follow Up      NAME:  Ingrid Bateman  MEDICAL RECORD NUMBER:  [id number]  AGE: 56 y.o. GENDER: female  : 1965  TODAY'S DATE:  2022    Attempted to follow up with pt for wounds to breast/abdominal folds and buttocks, primary RN in room as well. Pt became tearful and stated \"not right now\" and \"I'm sorry, nurse. \" Dr Galvin Longs in to see pt and she continued to be tearful and stated that she was having a lot of pain in her back and neck. Will defer visit and attempt again at another time.      RIMA Preston, [facility]  Wound, Ostomy, and Continence Nursing  [phone]

## 2022-09-14 NOTE — CARE COORDINATION
ONGOING DISCHARGE PLANNING NOTE:    Writer reviewed notes, and discharge plan is to discharge to home. Patient refused to have VNS or discharge to a Facility .     Patient will have a EGD tomorrow   Trend H&H  Continue PPI     Electronically signed by Josephine Jack RN on 9/14/2022 at 3:27 PM

## 2022-09-14 NOTE — PROGRESS NOTES
[facility]    Gastroenterology Daily Progress Note      Patient:   Price Hwang   :    1965   Facility:   [facility]  Date:     2022  Consultant:   LAILA Decker CNP, CNP      SUBJECTIVE  56 y.o. female admitted 2022 with DKA, type 1, not at goal [facility]) [E10.10]  DKA, type 2, not at goal [facility]) [E11.10]  Acute renal failure, unspecified acute renal failure type ([facility] [N17.9]  Diabetic ketoacidosis with coma associated with type 1 diabetes mellitus ([facility] [address] [E10.11] and seen for anemia with coffee ground emesis. The pt was seen and examined. Hgb 6.4 no BM or emesis overnight, she denies abdominal pain, .         OBJECTIVE  Scheduled Meds:   thiamine mononitrate  100 mg Oral Daily    folic acid  1 mg Oral Daily    OLANZapine zydis  5 mg Oral Nightly    insulin glargine  20 Units SubCUTAneous BID    pantoprazole (PROTONIX) 40 mg injection  40 mg IntraVENous BID    insulin lispro  0-4 Units SubCUTAneous TID WC    insulin lispro  0-4 Units SubCUTAneous Nightly    atorvastatin  40 mg Oral Nightly    budesonide-formoterol  2 puff Inhalation BID    [Held by provider] metFORMIN  1,000 mg Oral BID WC    mirtazapine  7.5 mg Oral Nightly    traZODone  150 mg Oral Nightly    venlafaxine  150 mg Oral Daily with breakfast    fluticasone  1 spray Each Nostril Daily       Vital Signs:  BP (!) 94/49   Pulse 88   Temp 99 °F (37.2 °C) (Axillary)   Resp 18   Ht 5' 5\" (1.651 m)   Wt 214 lb 8.1 oz (97.3 kg)   LMP  (LMP Unknown)   SpO2 95%   PF (!) 15 L/min   BMI 35.70 kg/m²      Physical Exam:   General Appearance: alert and oriented to person, place  well-developed and well-nourished, in no acute distress  Skin: warm and dry, no rash or erythema  Head: normocephalic and atraumatic  Eyes: pupils equal, round, and reactive to light, extraocular eye movements intact, conjunctivae normal  ENT: hearing grossly normal bilaterally  Neck: neck supple and non tender without mass, no thyromegaly or thyroid nodules, no cervical lymphadenopathy   Pulmonary/Chest: clear to auscultation bilaterally- no wheezes, rales or rhonchi, normal air movement, no respiratory distress  Cardiovascular: normal rate, regular rhythm, normal S1 and S2, no murmurs, rubs, clicks or gallops, distal pulses intact, no carotid bruits  Abdomen: soft, non-tender, non-distended, normal bowel sounds, no masses or organomegaly  Extremities: no cyanosis, clubbing or edema  Musculoskeletal: normal range of motion, no joint swelling, deformity or tenderness  Neurologic: disoriented to time and muscle strength normal    Lab and Imaging Review     CBC  Recent Labs     09/12/22  1527 09/13/22  1132 09/14/22  0546   HGB 7.6* 7.1* 6.4*   HCT 23.6* 22.1* 19.5*       BMP  Recent Labs     09/13/22  0630 09/13/22  1132 09/14/22  0546    139 143   K 3.5* 4.1 3.6*   * 109* 112*   CO2 24 21 25           Hep A IgM NONREACTIVE  NONREACTIVE   Hepatitis B Surface Ag NONREACTIVE  NONREACTIVE   Hepatitis C Ab NONREACTIVE  NONREACTIVE   Hep B Core Ab, IgM NONREACTIVE  NONREACTIVE      FINDINGS:liver us 9/10/22   LIVER:  The liver demonstrates normal echogenicity without evidence of   intrahepatic biliary ductal dilatation. Hepatopetal flow portal vein. Liver   18.3 cm in length. BILIARY SYSTEM:  Gallstones in the gallbladder. Negative sonographic   Gilliland's sign. Mild wall thickening at 4.6 mm. No pericholecystic fluid. Common bile duct is within normal limits measuring 6.8 mm. RIGHT KIDNEY: The right kidney is grossly unremarkable without evidence of   hydronephrosis. PANCREAS:  Visualized portions of the pancreas are unremarkable. OTHER: No evidence of right upper quadrant ascites. Impression   Unremarkable ultrasound the liver. Cholelithiasis with mild gallbladder wall thickening. Negative sonographic   Gilliland's sign. No pericholecystic fluid.        Common duct upper normal at 6.8 mm.  No intraductal stone demonstrated. Correlation clinical findings and laboratory values required. ASSESSMENT/plan  Anemia and thrombocytopenia,  coffee ground emesis no further emesis   -continue ppi   Trend hh, will be transfused today  Pt agreeable to egd will plan for tomorrow    2.elevated lft with hx of drug abuse; us shows cholelithiasis, acute hep panel non reactive   3. MARTIN, improved hypernatremia mgt per nephrology     4. DKA resolved     5. AMS  psych following    Time spent reviewing chart assessing pt and d/w attending md around 30 minutes    This plan was formulated in collaboration with Dr. Anna Rodriguez .     Electronically signed by: LAILA Valdivia CNP on 9/14/2022 at 9:24 AM

## 2022-09-15 ENCOUNTER — ANESTHESIA (OUTPATIENT)
Dept: ENDOSCOPY | Age: 57
DRG: 420 | End: 2022-09-15
Payer: COMMERCIAL

## 2022-09-15 LAB
GLUCOSE BLD-MCNC: 102 MG/DL (ref 65–105)
GLUCOSE BLD-MCNC: 203 MG/DL (ref 65–105)
GLUCOSE BLD-MCNC: 227 MG/DL (ref 65–105)
GLUCOSE BLD-MCNC: 69 MG/DL (ref 65–105)
HCT VFR BLD CALC: 22.7 % (ref 36–46)
HEMOGLOBIN: 7.7 G/DL (ref 12–16)

## 2022-09-15 PROCEDURE — 6370000000 HC RX 637 (ALT 250 FOR IP): Performed by: INTERNAL MEDICINE

## 2022-09-15 PROCEDURE — 85018 HEMOGLOBIN: CPT

## 2022-09-15 PROCEDURE — 2060000000 HC ICU INTERMEDIATE R&B

## 2022-09-15 PROCEDURE — 82947 ASSAY GLUCOSE BLOOD QUANT: CPT

## 2022-09-15 PROCEDURE — 2580000003 HC RX 258: Performed by: INTERNAL MEDICINE

## 2022-09-15 PROCEDURE — 36415 COLL VENOUS BLD VENIPUNCTURE: CPT

## 2022-09-15 PROCEDURE — 6360000002 HC RX W HCPCS: Performed by: INTERNAL MEDICINE

## 2022-09-15 PROCEDURE — 85014 HEMATOCRIT: CPT

## 2022-09-15 PROCEDURE — 99232 SBSQ HOSP IP/OBS MODERATE 35: CPT | Performed by: INTERNAL MEDICINE

## 2022-09-15 PROCEDURE — C9113 INJ PANTOPRAZOLE SODIUM, VIA: HCPCS | Performed by: STUDENT IN AN ORGANIZED HEALTH CARE EDUCATION/TRAINING PROGRAM

## 2022-09-15 PROCEDURE — A4216 STERILE WATER/SALINE, 10 ML: HCPCS | Performed by: INTERNAL MEDICINE

## 2022-09-15 PROCEDURE — A4216 STERILE WATER/SALINE, 10 ML: HCPCS | Performed by: STUDENT IN AN ORGANIZED HEALTH CARE EDUCATION/TRAINING PROGRAM

## 2022-09-15 PROCEDURE — 6360000002 HC RX W HCPCS: Performed by: STUDENT IN AN ORGANIZED HEALTH CARE EDUCATION/TRAINING PROGRAM

## 2022-09-15 PROCEDURE — 2580000003 HC RX 258: Performed by: STUDENT IN AN ORGANIZED HEALTH CARE EDUCATION/TRAINING PROGRAM

## 2022-09-15 PROCEDURE — C9113 INJ PANTOPRAZOLE SODIUM, VIA: HCPCS | Performed by: INTERNAL MEDICINE

## 2022-09-15 PROCEDURE — 2500000003 HC RX 250 WO HCPCS: Performed by: INTERNAL MEDICINE

## 2022-09-15 RX ORDER — INSULIN GLARGINE 100 [IU]/ML
10 INJECTION, SOLUTION SUBCUTANEOUS 2 TIMES DAILY
Status: DISCONTINUED | OUTPATIENT
Start: 2022-09-16 | End: 2022-09-17

## 2022-09-15 RX ORDER — MAGNESIUM SULFATE HEPTAHYDRATE 40 MG/ML
2000 INJECTION, SOLUTION INTRAVENOUS ONCE
Status: COMPLETED | OUTPATIENT
Start: 2022-09-15 | End: 2022-09-15

## 2022-09-15 RX ADMIN — MAGNESIUM SULFATE HEPTAHYDRATE 2000 MG: 40 INJECTION, SOLUTION INTRAVENOUS at 12:29

## 2022-09-15 RX ADMIN — MIRTAZAPINE 7.5 MG: 15 TABLET, FILM COATED ORAL at 20:34

## 2022-09-15 RX ADMIN — OLANZAPINE 7.5 MG: 15 TABLET, ORALLY DISINTEGRATING ORAL at 20:33

## 2022-09-15 RX ADMIN — TRAZODONE HYDROCHLORIDE 150 MG: 100 TABLET ORAL at 20:33

## 2022-09-15 RX ADMIN — PREGABALIN 75 MG: 75 CAPSULE ORAL at 20:33

## 2022-09-15 RX ADMIN — ATORVASTATIN CALCIUM 40 MG: 40 TABLET, FILM COATED ORAL at 20:33

## 2022-09-15 RX ADMIN — SODIUM CHLORIDE 40 MG: 9 INJECTION INTRAMUSCULAR; INTRAVENOUS; SUBCUTANEOUS at 09:24

## 2022-09-15 ASSESSMENT — PAIN - FUNCTIONAL ASSESSMENT: PAIN_FUNCTIONAL_ASSESSMENT: 0-10

## 2022-09-15 NOTE — CARE COORDINATION
ONGOING DISCHARGE PLANNING NOTE:    Writer reviewed notes, and discharge plan is to discharge to home with no needs     Patient refused for [facility]NS to come to her home     Patient refuses to discharge to a SNF      Patient will have a EGD done today pt very nervous about procedure    Will continue to follow for needs     Electronically signed by Cami Bautista RN on 9/15/2022 at 11:25 AM

## 2022-09-15 NOTE — PROGRESS NOTES
Patient blood sugar 69. OJ given to patient. Patient refusing recheck. Dinner plate also given to patient.

## 2022-09-15 NOTE — PROGRESS NOTES
Patient refusing post transfusion law draw stating, \"no, no. No more blood from me today\". Brian Tobar NP notified of patient refusing to allow RN to draw labs for a post transfusion HH.

## 2022-09-15 NOTE — PROGRESS NOTES
34.0-34.9,adult 11/28/2017    Cannabis use disorder, severe, dependence ([address] 12/19/2017    Cerebrovascular accident (CVA) ([address] 6/14/2017    Chest pain 11/5/2016    Chronic renal insufficiency     Cocaine abuse ([address] 1/5/2018    COVID-19 virus RNA test result unknown     DDD (degenerative disc disease), cervical     Diabetes mellitus ([address]     Dizziness 6/13/2017    Fibromyalgia     History of stroke 9/9/2017    Homicidal ideation 11/6/2017    Hyperglycemia     Hypertension     Hypotension 1/18/2019    IDDM (insulin dependent diabetes mellitus) 12/21/2015    Lupus ([address]     Migraine     Neuropathy     Neuropathy     Polysubstance abuse ([address] 9/17/2017    Post traumatic stress disorder (PTSD)     Posttraumatic stress disorder 5/29/2017    Recurrent depression ([address] 5/29/2017    Recurrent major depressive disorder, in partial remission ([address] 11/28/2017    Screening mammogram, encounter for 11/28/2017    Severe recurrent major depression with psychotic features ([address] 12/19/2017    Severe recurrent major depression without psychotic features ([address] 12/19/2017    Stroke (cerebrum) ([address] 6/14/2017    Stroke ([address]     per chart notes    Suicidal ideation     Suicidal intent 3/10/2017    Vitamin D deficiency 11/28/2017    White matter changes 6/13/2017       FAMILY/SOCIAL HISTORY:  Family History   Problem Relation Age of Onset    Diabetes Mother     Stroke Mother     Diabetes Father     Diabetes Sister     Diabetes Brother     Other Son         GSW    No Known Problems Sister      Social History     Socioeconomic History    Marital status: Legally      Spouse name: Not on file    Number of children: Not on file    Years of education: Not on file    Highest education level: Not on file   Occupational History    Not on file   Tobacco Use    Smoking status: Never    Smokeless tobacco: Never   Vaping Use    Vaping Use: Never used   Substance and Sexual Activity    Alcohol use: Not Currently    Drug use: Yes     Types: Marijuana ([facility]), Cocaine     Comment: + Cocaine 2/2021 also, see Care Everywhere UDS    Sexual activity: Not Currently     Partners: Male   Other Topics Concern    Not on file   Social History Narrative    Not on file     Social Determinants of Health     Financial Resource Strain: Not on file   Food Insecurity: Not on file   Transportation Needs: Not on file   Physical Activity: Not on file   Stress: Not on file   Social Connections: Unknown    Frequency of Communication with Friends and Family: Once a week    Frequency of Social Gatherings with Friends and Family: Not on file    Attends Sabianism Services: Not on file    Active Member of Clubs or Organizations: No    Attends Club or Organization Meetings: Never    Marital Status:    Intimate Partner Violence: Not on file   Housing Stability: Not on file           ROS:  [x] All negative/unchanged except if checked.  Explain positive(checked items) below:  [] Constitutional  [] Eyes  [] Ear/Nose/Mouth/Throat  [] Respiratory  [] CV  [] GI  []   [] Musculoskeletal  [] Skin/Breast  [] Neurological  [] Endocrine  [] Heme/Lymph  [] Allergic/Immunologic    Explanation:     MEDICATIONS:    Current Facility-Administered Medications:     0.9 % sodium chloride infusion, , IntraVENous, PRN, LAILA Cole - CNP    pregabalin (LYRICA) capsule 75 mg, 75 mg, Oral, BID, Trey Benz MD, 75 mg at 09/14/22 2125    thiamine mononitrate tablet 100 mg, 100 mg, Oral, Daily, Jose Macedo MD, 100 mg at 16/53/95 7462    folic acid (FOLVITE) tablet 1 mg, 1 mg, Oral, Daily, Jose Macedo MD, 1 mg at 09/14/22 0949    OLANZapine zydis (ZYPREXA) disintegrating tablet 5 mg, 5 mg, Oral, Nightly, Jermaine Carrizales MD, 5 mg at 09/14/22 0200    haloperidol (HALDOL) tablet 1 mg, 1 mg, Oral, Q6H PRN, Pineda Vergara MD    insulin glargine (LANTUS) injection vial 20 Units, 20 Units, SubCUTAneous, BID, LAILA Last - CNP, 20 Units at 09/14/22 2125    0.9 % sodium chloride infusion, , IntraVENous, Continuous, Erin Boyd MD, Stopped at 09/13/22 1312    potassium chloride 10 mEq/100 mL IVPB (Peripheral Line), 10 mEq, IntraVENous, PRN, Hali Bourne MD, Stopped at 09/13/22 1312    magnesium sulfate 1000 mg in dextrose 5% 100 mL IVPB, 1,000 mg, IntraVENous, PRN, Hali Bourne MD, Stopped at 09/12/22 2113    acetaminophen (TYLENOL) tablet 650 mg, 650 mg, Oral, Q4H PRN, Chrissy Diamond MD, 650 mg at 09/14/22 1015    pantoprazole (PROTONIX) 40 mg in sodium chloride (PF) 10 mL injection, 40 mg, IntraVENous, BID, Renee Loyola MD, 40 mg at 09/14/22 2125    insulin lispro (HUMALOG) injection vial 0-4 Units, 0-4 Units, SubCUTAneous, TID WC, Renee Loyola MD, 2 Units at 09/14/22 1733    insulin lispro (HUMALOG) injection vial 0-4 Units, 0-4 Units, SubCUTAneous, Nightly, Renee Loyola MD    glucose chewable tablet 16 g, 4 tablet, Oral, PRN, Renee Loyola MD    dextrose bolus 10% 125 mL, 125 mL, IntraVENous, PRN **OR** dextrose bolus 10% 250 mL, 250 mL, IntraVENous, PRN, Renee Loyola MD    dextrose 10 % infusion, , IntraVENous, Continuous PRN, Renee Loyola MD    glucagon (rDNA) injection 1 mg, 1 mg, SubCUTAneous, PRN, Dilcia Holman MD    dextrose 10 % infusion, , IntraVENous, Continuous PRN, Dilcia Holman MD    albuterol sulfate HFA (PROVENTIL;VENTOLIN;PROAIR) 108 (90 Base) MCG/ACT inhaler 2 puff, 2 puff, Inhalation, Q4H PRN, Jose Macedo MD    atorvastatin (LIPITOR) tablet 40 mg, 40 mg, Oral, Nightly, Jose Macedo MD, 40 mg at 09/14/22 2125    budesonide-formoterol (SYMBICORT) 80-4.5 MCG/ACT inhaler 2 puff, 2 puff, Inhalation, BID, Jose Macedo MD, 2 puff at 09/14/22 0709    [Held by provider] metFORMIN (GLUCOPHAGE) tablet 1,000 mg, 1,000 mg, Oral, BID WC, Jose MD Hellen    mirtazapine (REMERON) tablet 7.5 mg, 7.5 mg, Oral, Nightly, Jose MD Hellen, 7.5 mg at 09/14/22 2125    traZODone (DESYREL) tablet 150 mg, 150 mg, Oral, Nightly, Jose MD Hellen, 150 mg at 09/14/22 2124 venlafaxine (EFFEXOR XR) extended release capsule 150 mg, 150 mg, Oral, Daily with breakfast, Jose Macedo MD, 150 mg at 09/14/22 0949    fluticasone (FLONASE) 50 MCG/ACT nasal spray 1 spray, 1 spray, Each Nostril, Daily, Jose Macedo MD, 1 spray at 09/11/22 0901    sodium phosphate 10 mmol in sodium chloride 0.9 % 250 mL IVPB, 10 mmol, IntraVENous, PRN, Stopped at 09/13/22 1311 **OR** sodium phosphate 15 mmol in dextrose 5 % 250 mL IVPB, 15 mmol, IntraVENous, PRN, Stopped at 09/13/22 0125 **OR** sodium phosphate 20 mmol in dextrose 5 % 500 mL IVPB, 20 mmol, IntraVENous, PRN, Jose Macedo MD    polyethylene glycol (GLYCOLAX) packet 17 g, 17 g, Oral, Daily PRN, Jose Macedo MD      Examination:  /78   Pulse 95   Temp 99 °F (37.2 °C) (Oral)   Resp 20   Ht 5' 5\" (1.651 m)   Wt 214 lb 8.1 oz (97.3 kg)   LMP  (LMP Unknown)   SpO2 100%   PF (!) 15 L/min   BMI 35.70 kg/m²      Medication side effects(SE): none    Mental Status Examination:    Level of consciousness:  within normal limits   Appearance:  fair grooming and fair hygiene  Behavior/Motor:  no abnormalities noted  Attitude toward examiner:  cooperative  Speech:  hyperverbal   Mood: labile  Affect:  mood congruent  Thought processes:  overabundance of ideas   Thought content:  Homicidal ideation - none  Suicidal Ideation:  denies suicidal ideation  Delusions:  no evidence of delusions  Perceptual Disturbance:  denies any perceptual disturbance  Cognition:  disoriented   Concentration distractible  Insight poor   Judgement poor     ASSESSMENT:   Patient symptoms are:  [] Well controlled  [] Improving  [] Worsening  [x] No change      Diagnosis: Delirium due to medical condition  Principal Problem:    DKA, type 1, not at goal [facility])  Active Problems:    DKA, type 2, not at goal [facility])    Uremic encephalopathy    Unspecified mood (affective) disorder (HCC)    Coffee ground emesis    Elevated LFTs    Diabetic ketoacidosis with coma associated with type 1 diabetes mellitus ([facility]    Cocaine abuse ([facility]    Acute GI bleeding  Resolved Problems:    * No resolved hospital problems. *      LABS:    Recent Labs     09/12/22  1527 09/13/22  1132 09/14/22  0546   HGB 7.6* 7.1* 6.4*     Recent Labs     09/13/22  0630 09/13/22  1132 09/14/22  0546    139 143   K 3.5* 4.1 3.6*   * 109* 112*   CO2 24 21 25     No results for input(s): BILITOT, ALKPHOS, AST, ALT in the last 72 hours. Lab Results   Component Value Date/Time    BARBSCNU NEGATIVE 09/08/2022 05:29 PM    LABBENZ NEGATIVE 09/08/2022 05:29 PM    LABMETH NEGATIVE 09/08/2022 05:29 PM    PPXUR NOT REPORTED 11/04/2021 03:43 PM     Lab Results   Component Value Date/Time    TSH 1.10 08/04/2022 12:21 AM     No results found for: LITHIUM  No results found for: VALPROATE, CBMZ    RISK ASSESSMENT: Moderate risk of agitation    Capacity Evaluation-the patient is able to give me no information about her health problems. She is delirious and disoriented. She is also labile in mood. The patient lacks the capacity to refuse medical care at this time   she would benefit from a surrogate decision maker    Treatment Plan:  Reviewed current Medications with the patient. Zydis 5 mg at nighttime added    Risks, benefits, side effects, drug-to-drug interactions and alternatives to treatment were discussed. The patient  consented to treatment. Encourage patient to attend group and other milieu activities. Discharge planning discussed with the patient and treatment team.    PSYCHOTHERAPY/COUNSELING:  [] Therapeutic interview  [x] Supportive  [] CBT  [] Ongoing  [] Other                                              [place] is a 56 y.o. female being evaluated face to face.     --Radha Burns MD on 9/14/2022 at 9:34 PM    An electronic signature was used to authenticate this note. **This report has been created using voice recognition software.  It may contain minor errors which are inherent in voice recognition technology. **

## 2022-09-15 NOTE — PROGRESS NOTES
Patient was brought back up from OR. Patient stated she was waiting to long to get the EGD done. Nurse notified NP Isaías aCrdozo. Order received and placed for diet order.

## 2022-09-15 NOTE — PLAN OF CARE
Problem: Discharge Planning  Goal: Discharge to home or other facility with appropriate resources  9/15/2022 1521 by Jamilah Kinney RN  Outcome: Progressing     Problem: Pain  Goal: Verbalizes/displays adequate comfort level or baseline comfort level  9/15/2022 1521 by Jamilah Kinney RN  Outcome: Progressing     Problem: Skin/Tissue Integrity  Goal: Absence of new skin breakdown  Description: 1. Monitor for areas of redness and/or skin breakdown  2. Assess vascular access sites hourly  3. Every 4-6 hours minimum:  Change oxygen saturation probe site  4. Every 4-6 hours:  If on nasal continuous positive airway pressure, respiratory therapy assess nares and determine need for appliance change or resting period.   9/15/2022 1521 by Jamilah Kinney RN  Outcome: Progressing     Problem: ABCDS Injury Assessment  Goal: Absence of physical injury  9/15/2022 1521 by Jamilah Kinney RN  Outcome: Progressing

## 2022-09-15 NOTE — PROGRESS NOTES
Writer visited PT per her daily visit request and notify that her [employer] phone number has been disconnected. PT shared that she has a bad addition that no one knows expect the doctor here whom she told him. PT stated that she wants to be free from her past. Pt appreciated prayer. 09/15/22 1140   Encounter Summary   Encounter Overview/Reason  Spiritual/Emotional Needs   Service Provided For: Patient   Referral/Consult From: Patient   Last Encounter  09/15/22   Complexity of Encounter Moderate   Spiritual/Emotional needs   Type Spiritual Support   Assessment/Intervention/Outcome   Assessment Calm;Coping; Hopeful   Intervention Active listening;Discussed relationship with God;Discussed illness injury and its impact;Prayer (assurance of)/[place];Sustaining Presence/Ministry of presence   Outcome Acceptance; Coping;Engaged in conversation;Peace; Receptive

## 2022-09-15 NOTE — PROGRESS NOTES
Patient anxious and tearful; patient grieving the loss of 5 family members within the last twelve months; patient also concerned about the medical procedure she is having tomorrow; patient asked writer to [facility] writer was unable to do so because [employer] phone has been disconnected; patient comforted by prayer; listening presence and support;     09/14/22 2022   Encounter Summary   Encounter Overview/Reason  Spiritual/Emotional Needs   Service Provided For: Patient   Referral/Consult From: Zac   Last Encounter  09/14/22   Complexity of Encounter Moderate   Spiritual/Emotional needs   Type Spiritual Support   Grief, Loss, and Adjustments   Type Grief and loss   Assessment/Intervention/Outcome   Assessment Anxious; Impaired resilience; Powerlessness;Sad;Tearful   Intervention Active listening;Discussed illness injury and its impact;Grief Care;Prayer (assurance of)/[place];Sustaining Presence/Ministry of presence   Outcome Engaged in conversation;Expressed feelings, needs, and concerns;Expressed Gratitude;Receptive;Grieving

## 2022-09-15 NOTE — PROGRESS NOTES
Comprehensive Nutrition Assessment    Type and Reason for Visit:  Initial    Nutrition Recommendations/Plan:   Continue NPO as ordered. Follow for nutrition progression. Malnutrition Assessment:  Malnutrition Status:  Insufficient data (09/15/22 0733)    Context:  Acute Illness     Findings of the 6 clinical characteristics of malnutrition:  Energy Intake:  75% or less of estimated energy requirements for 7 or more days  Weight Loss:  Unable to assess     Body Fat Loss:  Unable to assess     Muscle Mass Loss:  Unable to assess    Fluid Accumulation:  No significant fluid accumulation     Strength:  Not Performed    Nutrition Assessment:    Pt being seen for length of stay. Family hadn't heard from patient for several days and called EMS. On arrival found pt with altered mental status and house in [address] [place] [address]. Pt is an IDDM known to be non-complaint with EMS finding many of vials in refrigerator unused. Pt has hx of substance abuse, positive for cocaine & fentanyl on admission. Pt is NPO due to low hemoglobin with plan to do EGD today. Wound care consulted for multiple wounds. Nutrition Related Findings:    Edema: +1 non-pitting BLE's. Hx: bipolar, substance abuse, IDDM-non-compliant. Labs & meds reviewed. Wound Type: Multiple, Wound Consult Pending, Diabetic Ulcer       Current Nutrition Intake & Therapies:    Average Meal Intake: NPO     Diet NPO Exceptions are: Sips of Water with Meds    Anthropometric Measures:  Height: 5' 5\" (165.1 cm)  Ideal Body Weight (IBW): 125 lbs (57 kg)    Admission Body Weight: 205 lb (93 kg)  Current Body Weight: 205 lb (93 kg), 164 % IBW. Weight Source: Bed Scale  Current BMI (kg/m2): 34.1                          BMI Categories: Obese Class 1 (BMI 30.0-34. 9)    Estimated Daily Nutrient Needs:  Energy Requirements Based On: Formula  Weight Used for Energy Requirements: Admission  Energy (kcal/day): 1836 kcals based on [facility] using 1.2 activity factor  Weight Used for Protein Requirements: Ideal  Protein (g/day): 74-85 gm based on 1.3-1.5 gm         Nutrition Diagnosis:   Inadequate oral intake related to altered GI function as evidenced by NPO or clear liquid status due to medical condition    Nutrition Interventions:   Food and/or Nutrient Delivery: Continue NPO  Nutrition Education/Counseling: No recommendation at this time  Coordination of Nutrition Care: Continue to monitor while inpatient       Goals:     Goals: Initiate PO diet       Nutrition Monitoring and Evaluation:   Behavioral-Environmental Outcomes: None Identified  Food/Nutrient Intake Outcomes: None Identified  Physical Signs/Symptoms Outcomes: Biochemical Data, GI Status, Nutrition Focused Physical Findings, Skin, Weight    Discharge Planning:     Too soon to determine     Serge Aadma, [address] [place] [address],   [phone]

## 2022-09-15 NOTE — PLAN OF CARE
Problem: Safety - Adult  Goal: Free from fall injury  9/15/2022 0314 by Fahad Haywood RN  Outcome: Progressing  No falls noted this shift. Bed kept in low position. Safe environment maintained. Bedside table & call light in reach. Uses call light appropriately when needing assistance. Problem: Skin/Tissue Integrity  Goal: Absence of new skin breakdown  Description: 1. Monitor for areas of redness and/or skin breakdown  2. Assess vascular access sites hourly  3. Every 4-6 hours minimum:  Change oxygen saturation probe site  4. Every 4-6 hours:  If on nasal continuous positive airway pressure, respiratory therapy assess nares and determine need for appliance change or resting period. 9/15/2022 0314 by Fahad Haywood RN  Outcome: Progressing  Patient refused complete skin assessment. Patient turns self in bed. Problem: Cardiovascular - Adult  Goal: Maintains optimal cardiac output and hemodynamic stability  9/15/2022 0314 by Fahad Haywood RN  Outcome: Progressing     Problem: Gastrointestinal - Adult  Goal: Minimal or absence of nausea and vomiting  9/15/2022 0314 by Fahad Haywood RN  Outcome: Progressing  No N/V this shift.     Problem: Genitourinary - Adult  Goal: Urinary catheter remains patent  9/15/2022 0314 by Fahad Haywood RN  Outcome: Progressing     Problem: Discharge Planning  Goal: Discharge to home or other facility with appropriate resources  9/15/2022 0314 by Fahad Haywood RN  Outcome: Progressing

## 2022-09-16 ENCOUNTER — ANESTHESIA (OUTPATIENT)
Dept: ENDOSCOPY | Age: 57
DRG: 420 | End: 2022-09-16
Payer: COMMERCIAL

## 2022-09-16 ENCOUNTER — ANESTHESIA EVENT (OUTPATIENT)
Dept: ENDOSCOPY | Age: 57
DRG: 420 | End: 2022-09-16
Payer: COMMERCIAL

## 2022-09-16 LAB
ABO/RH: NORMAL
ABSOLUTE EOS #: 0.17 K/UL (ref 0–0.4)
ABSOLUTE LYMPH #: 2.49 K/UL (ref 1–4.8)
ABSOLUTE MONO #: 1.25 K/UL (ref 0.1–1.3)
ANION GAP SERPL CALCULATED.3IONS-SCNC: 9 MMOL/L (ref 9–17)
ANTIBODY SCREEN: NEGATIVE
ARM BAND NUMBER: NORMAL
BASOPHILS # BLD: 0 % (ref 0–2)
BASOPHILS ABSOLUTE: 0 K/UL (ref 0–0.2)
BLD PROD TYP BPU: NORMAL
BLOOD BANK BLOOD PRODUCT EXPIRATION DATE: NORMAL
BLOOD BANK ISBT PRODUCT BLOOD TYPE: 6200
BLOOD BANK PRODUCT CODE: NORMAL
BLOOD BANK UNIT TYPE AND RH: NORMAL
BPU ID: NORMAL
BUN BLDV-MCNC: 7 MG/DL (ref 6–20)
CALCIUM SERPL-MCNC: 7.8 MG/DL (ref 8.6–10.4)
CHLORIDE BLD-SCNC: 104 MMOL/L (ref 98–107)
CO2: 26 MMOL/L (ref 20–31)
CREAT SERPL-MCNC: 0.75 MG/DL (ref 0.5–0.9)
CROSSMATCH RESULT: NORMAL
DISPENSE STATUS BLOOD BANK: NORMAL
EOSINOPHILS RELATIVE PERCENT: 2 % (ref 0–4)
EXPIRATION DATE: NORMAL
GFR AFRICAN AMERICAN: >60 ML/MIN
GFR NON-AFRICAN AMERICAN: >60 ML/MIN
GFR SERPL CREATININE-BSD FRML MDRD: ABNORMAL ML/MIN/{1.73_M2}
GLUCOSE BLD-MCNC: 125 MG/DL (ref 65–105)
GLUCOSE BLD-MCNC: 145 MG/DL (ref 65–105)
GLUCOSE BLD-MCNC: 185 MG/DL (ref 65–105)
GLUCOSE BLD-MCNC: 212 MG/DL (ref 70–99)
GLUCOSE BLD-MCNC: 220 MG/DL (ref 65–105)
GLUCOSE BLD-MCNC: 84 MG/DL (ref 65–105)
GLUCOSE BLD-MCNC: 92 MG/DL (ref 65–105)
HCT VFR BLD CALC: 22.7 % (ref 36–46)
HEMOGLOBIN: 7.4 G/DL (ref 12–16)
LYMPHOCYTES # BLD: 30 % (ref 24–44)
MCH RBC QN AUTO: 30.7 PG (ref 26–34)
MCHC RBC AUTO-ENTMCNC: 32.4 G/DL (ref 31–37)
MCV RBC AUTO: 94.8 FL (ref 80–100)
MONOCYTES # BLD: 15 % (ref 1–7)
MORPHOLOGY: ABNORMAL
PDW BLD-RTO: 13.7 % (ref 11.5–14.9)
PLATELET # BLD: 285 K/UL (ref 150–450)
PMV BLD AUTO: 9.4 FL (ref 6–12)
POTASSIUM SERPL-SCNC: 4 MMOL/L (ref 3.7–5.3)
RBC # BLD: 2.4 M/UL (ref 4–5.2)
SEG NEUTROPHILS: 53 % (ref 36–66)
SEGMENTED NEUTROPHILS ABSOLUTE COUNT: 4.39 K/UL (ref 1.3–9.1)
SODIUM BLD-SCNC: 139 MMOL/L (ref 135–144)
TRANSFUSION STATUS: NORMAL
UNIT DIVISION: 0
UNIT ISSUE DATE/TIME: NORMAL
WBC # BLD: 8.3 K/UL (ref 3.5–11)

## 2022-09-16 PROCEDURE — 2580000003 HC RX 258: Performed by: INTERNAL MEDICINE

## 2022-09-16 PROCEDURE — 88342 IMHCHEM/IMCYTCHM 1ST ANTB: CPT

## 2022-09-16 PROCEDURE — 82947 ASSAY GLUCOSE BLOOD QUANT: CPT

## 2022-09-16 PROCEDURE — 7100000000 HC PACU RECOVERY - FIRST 15 MIN: Performed by: INTERNAL MEDICINE

## 2022-09-16 PROCEDURE — 88305 TISSUE EXAM BY PATHOLOGIST: CPT

## 2022-09-16 PROCEDURE — 3700000000 HC ANESTHESIA ATTENDED CARE: Performed by: INTERNAL MEDICINE

## 2022-09-16 PROCEDURE — 0DB58ZX EXCISION OF ESOPHAGUS, VIA NATURAL OR ARTIFICIAL OPENING ENDOSCOPIC, DIAGNOSTIC: ICD-10-PCS | Performed by: INTERNAL MEDICINE

## 2022-09-16 PROCEDURE — 99232 SBSQ HOSP IP/OBS MODERATE 35: CPT | Performed by: INTERNAL MEDICINE

## 2022-09-16 PROCEDURE — 80048 BASIC METABOLIC PNL TOTAL CA: CPT

## 2022-09-16 PROCEDURE — 2500000003 HC RX 250 WO HCPCS: Performed by: NURSE ANESTHETIST, CERTIFIED REGISTERED

## 2022-09-16 PROCEDURE — 36415 COLL VENOUS BLD VENIPUNCTURE: CPT

## 2022-09-16 PROCEDURE — 6360000002 HC RX W HCPCS: Performed by: INTERNAL MEDICINE

## 2022-09-16 PROCEDURE — 7100000001 HC PACU RECOVERY - ADDTL 15 MIN: Performed by: INTERNAL MEDICINE

## 2022-09-16 PROCEDURE — 3609012400 HC EGD TRANSORAL BIOPSY SINGLE/MULTIPLE: Performed by: INTERNAL MEDICINE

## 2022-09-16 PROCEDURE — 85025 COMPLETE CBC W/AUTO DIFF WBC: CPT

## 2022-09-16 PROCEDURE — 1200000000 HC SEMI PRIVATE

## 2022-09-16 PROCEDURE — 2580000003 HC RX 258: Performed by: NURSE ANESTHETIST, CERTIFIED REGISTERED

## 2022-09-16 PROCEDURE — 6360000002 HC RX W HCPCS: Performed by: NURSE ANESTHETIST, CERTIFIED REGISTERED

## 2022-09-16 PROCEDURE — 6370000000 HC RX 637 (ALT 250 FOR IP): Performed by: INTERNAL MEDICINE

## 2022-09-16 PROCEDURE — A4216 STERILE WATER/SALINE, 10 ML: HCPCS | Performed by: INTERNAL MEDICINE

## 2022-09-16 PROCEDURE — 3700000001 HC ADD 15 MINUTES (ANESTHESIA): Performed by: INTERNAL MEDICINE

## 2022-09-16 PROCEDURE — 2709999900 HC NON-CHARGEABLE SUPPLY: Performed by: INTERNAL MEDICINE

## 2022-09-16 PROCEDURE — 0DB68ZX EXCISION OF STOMACH, VIA NATURAL OR ARTIFICIAL OPENING ENDOSCOPIC, DIAGNOSTIC: ICD-10-PCS | Performed by: INTERNAL MEDICINE

## 2022-09-16 PROCEDURE — C9113 INJ PANTOPRAZOLE SODIUM, VIA: HCPCS | Performed by: INTERNAL MEDICINE

## 2022-09-16 RX ORDER — ONDANSETRON 2 MG/ML
4 INJECTION INTRAMUSCULAR; INTRAVENOUS
Status: DISCONTINUED | OUTPATIENT
Start: 2022-09-16 | End: 2022-09-16 | Stop reason: HOSPADM

## 2022-09-16 RX ORDER — DIPHENHYDRAMINE HYDROCHLORIDE 50 MG/ML
12.5 INJECTION INTRAMUSCULAR; INTRAVENOUS
Status: DISCONTINUED | OUTPATIENT
Start: 2022-09-16 | End: 2022-09-16 | Stop reason: HOSPADM

## 2022-09-16 RX ORDER — LIDOCAINE HYDROCHLORIDE 10 MG/ML
INJECTION, SOLUTION EPIDURAL; INFILTRATION; INTRACAUDAL; PERINEURAL PRN
Status: DISCONTINUED | OUTPATIENT
Start: 2022-09-16 | End: 2022-09-16 | Stop reason: SDUPTHER

## 2022-09-16 RX ORDER — SODIUM CHLORIDE 0.9 % (FLUSH) 0.9 %
5-40 SYRINGE (ML) INJECTION PRN
Status: DISCONTINUED | OUTPATIENT
Start: 2022-09-16 | End: 2022-09-16

## 2022-09-16 RX ORDER — LIDOCAINE HYDROCHLORIDE 10 MG/ML
1 INJECTION, SOLUTION EPIDURAL; INFILTRATION; INTRACAUDAL; PERINEURAL
Status: DISCONTINUED | OUTPATIENT
Start: 2022-09-16 | End: 2022-09-16

## 2022-09-16 RX ORDER — SODIUM CHLORIDE 9 MG/ML
INJECTION, SOLUTION INTRAVENOUS PRN
Status: DISCONTINUED | OUTPATIENT
Start: 2022-09-16 | End: 2022-09-16

## 2022-09-16 RX ORDER — METOCLOPRAMIDE HYDROCHLORIDE 5 MG/ML
10 INJECTION INTRAMUSCULAR; INTRAVENOUS
Status: DISCONTINUED | OUTPATIENT
Start: 2022-09-16 | End: 2022-09-16 | Stop reason: HOSPADM

## 2022-09-16 RX ORDER — FENTANYL CITRATE 50 UG/ML
25 INJECTION, SOLUTION INTRAMUSCULAR; INTRAVENOUS EVERY 5 MIN PRN
Status: DISCONTINUED | OUTPATIENT
Start: 2022-09-16 | End: 2022-09-16 | Stop reason: HOSPADM

## 2022-09-16 RX ORDER — SODIUM CHLORIDE 0.9 % (FLUSH) 0.9 %
5-40 SYRINGE (ML) INJECTION EVERY 12 HOURS SCHEDULED
Status: DISCONTINUED | OUTPATIENT
Start: 2022-09-16 | End: 2022-09-16

## 2022-09-16 RX ORDER — SODIUM CHLORIDE 9 MG/ML
INJECTION, SOLUTION INTRAVENOUS CONTINUOUS
Status: DISCONTINUED | OUTPATIENT
Start: 2022-09-16 | End: 2022-09-16

## 2022-09-16 RX ORDER — PROPOFOL 10 MG/ML
INJECTION, EMULSION INTRAVENOUS PRN
Status: DISCONTINUED | OUTPATIENT
Start: 2022-09-16 | End: 2022-09-16 | Stop reason: SDUPTHER

## 2022-09-16 RX ORDER — PREGABALIN 75 MG/1
75 CAPSULE ORAL 2 TIMES DAILY
Status: DISCONTINUED | OUTPATIENT
Start: 2022-09-16 | End: 2022-09-22 | Stop reason: HOSPADM

## 2022-09-16 RX ORDER — SODIUM CHLORIDE 9 MG/ML
INJECTION, SOLUTION INTRAVENOUS CONTINUOUS PRN
Status: DISCONTINUED | OUTPATIENT
Start: 2022-09-16 | End: 2022-09-16 | Stop reason: SDUPTHER

## 2022-09-16 RX ADMIN — PREGABALIN 75 MG: 75 CAPSULE ORAL at 18:13

## 2022-09-16 RX ADMIN — SODIUM CHLORIDE 40 MG: 9 INJECTION INTRAMUSCULAR; INTRAVENOUS; SUBCUTANEOUS at 07:36

## 2022-09-16 RX ADMIN — LIDOCAINE HYDROCHLORIDE 80 MG: 10 INJECTION, SOLUTION EPIDURAL; INFILTRATION; INTRACAUDAL; PERINEURAL at 14:20

## 2022-09-16 RX ADMIN — SODIUM CHLORIDE: 9 INJECTION, SOLUTION INTRAVENOUS at 14:20

## 2022-09-16 RX ADMIN — PROPOFOL 100 MG: 10 INJECTION, EMULSION INTRAVENOUS at 14:20

## 2022-09-16 RX ADMIN — SODIUM CHLORIDE: 9 INJECTION, SOLUTION INTRAVENOUS at 12:21

## 2022-09-16 RX ADMIN — PROPOFOL 150 MCG/KG/MIN: 10 INJECTION, EMULSION INTRAVENOUS at 14:21

## 2022-09-16 RX ADMIN — ACETAMINOPHEN 650 MG: 325 TABLET, FILM COATED ORAL at 18:13

## 2022-09-16 ASSESSMENT — PAIN DESCRIPTION - LOCATION
LOCATION: ABDOMEN;BACK
LOCATION: ABDOMEN;BACK

## 2022-09-16 ASSESSMENT — PAIN - FUNCTIONAL ASSESSMENT: PAIN_FUNCTIONAL_ASSESSMENT: 0-10

## 2022-09-16 ASSESSMENT — PAIN SCALES - GENERAL
PAINLEVEL_OUTOF10: 0
PAINLEVEL_OUTOF10: 10
PAINLEVEL_OUTOF10: 10

## 2022-09-16 NOTE — PROGRESS NOTES
Patient refusing RN assessment at this time. Patient agreeable to some nightly meds, but told writer to [facility] me alone now\". Will attempt shift assessment at later time.

## 2022-09-16 NOTE — ANESTHESIA POSTPROCEDURE EVALUATION
POST- ANESTHESIA EVALUATION       Pt Name: Srikanth Alfredo  MRN: [id number]  YOB: 1965  Date of evaluation: 9/16/2022  Time:  3:56 PM      /75   Pulse 90   Temp 97.4 °F (36.3 °C) (Oral)   Resp 18   Ht 5' 5\" (1.651 m)   Wt 204 lb 9.4 oz (92.8 kg)   LMP  (LMP Unknown)   SpO2 98%   PF (!) 15 L/min   BMI 34.05 kg/m²      Consciousness Level  Awake  Cardiopulmonary Status  Stable  Pain Adequately Treated YES  Nausea / Vomiting  NO  Adequate Hydration  YES  Anesthesia Related Complications NONE      Electronically signed by Theo Batista MD on 9/16/2022 at 3:56 PM       Department of Anesthesiology  Postprocedure Note    Patient: Srikanth Alfredo  MRN: [id number]  YOB: 1965  Date of evaluation: 9/16/2022      Procedure Summary     Date: 09/16/22 Room / Location: [address]    Anesthesia Start: 6331 Anesthesia Stop: 5365    Procedure: EGD BIOPSY (Esophagus) Diagnosis:       Anemia, unspecified type      (ANEMIA)      (IP RM [id number])    Surgeons: Jing Austin MD Responsible Provider: Theo Batista MD    Anesthesia Type: general ASA Status: 3          Anesthesia Type: No value filed.     Elin Phase I: Elin Score: 10    Elin Phase II:        Anesthesia Post Evaluation

## 2022-09-16 NOTE — PROGRESS NOTES
Patient back for EGD. Vital signs/ blood sugar taken and patient reassessed. No changes. Verenice Palafox notified is back from EGD, once op note is placed she will place diet order.

## 2022-09-16 NOTE — OP NOTE
Operative Note      Patient: Sandi Atkins  YOB: 1965  MRN: [id number]    Date of Procedure: 9/16/2022    Pre-Op Diagnosis: Coffee-ground emesis. Post-Op Diagnosis: Clean-based duodenal ulcer. ATTENDING: Donovan Friedman MD.    PROCEDURE: Esophagogastroduodenoscopy (EGD). INDICATION: Coffee-ground emesis/melena    CONSENT: Informed consent was obtained after explaining the risks of procedure in detail including bleeding, perforation, aspiration, arrhythmia, risks related to anesthesia and sedation medication, benefits, alternatives and complications including surgery, prolonged hospitalization and rarely death to the patient. Patient verbalized understanding and agreed to proceed with procedure. ANESTHESIA: MAC    DESCRICPTION OF PROCEDURE: The patient was placed in left lateral decubitus position. Oxygen and cardiac monitoring document was attached. Patient's vital signs were continuously monitored for the procedure. After appropriate sedation was achieved, an Olympus adult gastroscope  was advanced under direct vision for patient's oral cavity into esophagus, stomach, first and second part of duodenum without any difficulty. Examination of duodenal bulb showed a clean-based 1 cm duodenal bulb ulcer. The second part of duodenum was unremarkable. Examination of gastric antrum showed gastric erosions. Random gastric biopsies were obtained to rule out H. pylori. On retroflexion cardia and fundus appeared normal.  Examination of gastric body was unremarkable as well. Examination of esophagus showed mild esophagitis but was otherwise unremarkable. Endoscope was then withdrawn out of patient's oral cavity. There were no immediate complications. The patient tolerated the procedure well. ENDOSCOPIC IMPRESSION:  1.  LA grade A esophagitis. 2.  Normal gastric cardia, fundus and body. 3.  Gastric antral erosions. Random gastric biopsies were obtained to rule out H. pylori. No evidence of gastric outlet obstruction. 4.  1 cm clean-based duodenal ulcer was seen in duodenal bulb. No evidence of active bleeding. 5.  Normal second part of duodenum. RECOMMENDATIONS:  1.  Observe clinical course. 2.  Protonix 40 mg PO bid for 6-8 weeks. 3.  Follow-up gastric biopsy results. 4.  Regular diet.                 Electronically signed by Bettina Haskins MD on 9/16/2022 at 6:38 PM

## 2022-09-16 NOTE — PROGRESS NOTES
.Patient transferred to room 2047. Tele-sitter in room. Bed alarm in place. V call light given/within reach. Patient alert and only oriented to self and given room orientation. Orders released/reviewed, initial assessment completed and navigator started. See chart for more detail.

## 2022-09-16 NOTE — PROGRESS NOTES
[address]    PROGRESS NOTE             9/16/2022    7:22 AM    Name:   Dixie Crawford  MRN:     [id number]     Acct:      [de-identified]   Room:   2112/2112-01   Day:  8  Admit Date:  9/8/2022 11:01 AM    PCP:  En Barillas MD  Code Status:  Full Code    Subjective:     C/C:   Chief Complaint   Patient presents with    Hyperglycemia    Altered Mental Status     Interval History Status: improved. Patient was seen and evaluated at bedside. More pleasant,  Patient refused EGD yesterday because she was waiting for 90      Brief History:     The patient is a 56 y.o. Non- / non  female with a history of asthma, bipolar 1 disorder, hypertension, stroke, polysubstance abuse, DM 2, degenerative disc disease, and depression with psychotic features, who presented to the ED with altered mental status. The patient was found unresponsive at her home where she lives alone. The patient had not been taking her insulin as her refrigerator was filled with unused insulin vials. The patients family had not heard from the patient for several days and EMS was called. The patient was found confused and obtunded, hypotensive, and hyperglycemic. The ED, the patient was hypotensive (BP 86/64) and tachypneic (RR 24). Blood glucose was 1,034. Serum potassium was 5.5, bicarbonate was 19 mmol/L [anion gap 25 mmol/L). Cr was 3.56 (baseline Cr 0.73). Levophed was administered. The patient was admitted for management of DKA and DKA protocol was initiated    Review of Systems:     Reason unable to perform ROS: Pt is uncooperative      Medications: Allergies:     Allergies   Allergen Reactions    Bactrim [Sulfamethoxazole-Trimethoprim] Swelling    Adhesive Tape Rash       Current Meds:   Scheduled Meds:    insulin glargine  10 Units SubCUTAneous BID    pregabalin  75 mg Oral BID    OLANZapine zydis  7.5 mg Oral Nightly    thiamine mononitrate  100 mg Oral Daily    folic acid  1 mg Oral Daily    pantoprazole (PROTONIX) 40 mg injection  40 mg IntraVENous BID    insulin lispro  0-4 Units SubCUTAneous TID WC    insulin lispro  0-4 Units SubCUTAneous Nightly    atorvastatin  40 mg Oral Nightly    budesonide-formoterol  2 puff Inhalation BID    [Held by provider] metFORMIN  1,000 mg Oral BID WC    mirtazapine  7.5 mg Oral Nightly    traZODone  150 mg Oral Nightly    venlafaxine  150 mg Oral Daily with breakfast    fluticasone  1 spray Each Nostril Daily     Continuous Infusions:    sodium chloride      sodium chloride Stopped (09/13/22 1312)    dextrose      dextrose       PRN Meds: sodium chloride, haloperidol, potassium chloride, magnesium sulfate, acetaminophen, glucose, dextrose bolus **OR** dextrose bolus, dextrose, glucagon (rDNA), dextrose, albuterol sulfate HFA, sodium phosphate IVPB **OR** sodium phosphate IVPB **OR** sodium phosphate IVPB, polyethylene glycol    Data:     Past Medical History:   has a past medical history of Acid reflux, Acute cystitis with hematuria, Acute non-recurrent maxillary sinusitis, Asthma, Bipolar 1 disorder ([address] Bipolar disorder, mixed ([address] BMI 34.0-34.9,adult, Cannabis use disorder, severe, dependence ([address] Cerebrovascular accident (CVA) ([address] Chest pain, Chronic renal insufficiency, Cocaine abuse ([address] COVID-19 virus RNA test result unknown, DDD (degenerative disc disease), cervical, Diabetes mellitus ([address] Dizziness, Fibromyalgia, History of stroke, Homicidal ideation, Hyperglycemia, Hypertension, Hypotension, IDDM (insulin dependent diabetes mellitus), Lupus ([address] Migraine, Neuropathy, Neuropathy, Polysubstance abuse ([address] Post traumatic stress disorder (PTSD), Posttraumatic stress disorder, Recurrent depression ([address] Recurrent major depressive disorder, in partial remission ([address] Screening mammogram, encounter for, Severe recurrent major depression with psychotic features ([address] Severe recurrent major depression without psychotic features ([facility] Stroke (cerebrum) ([facility] Stroke ([facility] Suicidal ideation, Suicidal intent, Vitamin D deficiency, and White matter changes. Social History:   reports that she has never smoked. She has never used smokeless tobacco. She reports that she does not currently use alcohol. She reports current drug use. Drugs: Marijuana (Weed) and Cocaine. Family History:   Family History   Problem Relation Age of Onset    Diabetes Mother     Stroke Mother     Diabetes Father     Diabetes Sister     Diabetes Brother     Other Son         GSW    No Known Problems Sister        Vitals:  /74   Pulse 100   Temp 98.9 °F (37.2 °C) (Oral)   Resp 18   Ht 5' 5\" (1.651 m)   Wt 204 lb 9.4 oz (92.8 kg)   LMP  (LMP Unknown)   SpO2 97%   PF (!) 15 L/min   BMI 34.05 kg/m²   Temp (24hrs), Av.1 °F (36.7 °C), Min:97.5 °F (36.4 °C), Max:98.9 °F (37.2 °C)    Recent Labs     09/15/22  0618 09/15/22  1157 09/15/22  1650 09/15/22  2034   POCGLU 203* 102 69 227*         I/O(24Hr):     Intake/Output Summary (Last 24 hours) at 2022 0722  Last data filed at 2022 0251  Gross per 24 hour   Intake --   Output 2900 ml   Net -2900 ml         Labs:    [unfilled]    Lab Results   Component Value Date/Time    SPECIAL NOT REPORTED 2022 12:47 PM     Lab Results   Component Value Date/Time    CULTURE NO GROWTH 5 DAYS 2022 12:18 PM       [unfilled]    Radiology:    ECHO Complete 2D W Doppler W Color    Result Date: 9/10/2022  1604 [facility] Transthoracic Echocardiography Report (TTE)  Patient Name Jose Parmar    Date of Study               2022               [facility]   Date of      1965  Gender                      Female  Birth   Age          56 year(s)  Race                        Black   Room Number          Height:                     65 inch, 165.1 cm   Corporate ID [id number]    Weight:                     180 pounds, 81.6 kg  #   Patient Acct [id number]   BSA:          1.89 m^2      BMI:      29.95  #                                                              kg/m^2   MR #         [id number]      Sonographer                 Carina Newsome   Accession #  [id number]  Interpreting Physician      [facility]   Fellow                   Referring Nurse                           Practitioner   Interpreting             Referring Physician  Fellow  Type of Study   TTE procedure:2D Echocardiogram, M-Mode, Doppler, Color Doppler. Procedure Date Date: 09/09/2022 Start: 12:50 PM Study Location: [address] [place] [address] Technical Quality: Fair visualization Indications:Tachycardia. Patient Status: Inpatient Height: 65 inches Weight: 180 pounds BSA: 1.89 m^2 BMI: 29.95 kg/m^2 Rhythm: Sinus tachycardia HR: 108 bpm BP: 98/55 mmHg CONCLUSIONS Summary Left ventricle is normal in size and wall thickness. Global left ventricular systolic function is normal. Estimated LV EF 60-65 %. No obvious wall motion abnormality seen. Left atrium is normal in size. No significant valvular regurgitation or stenosis seen. Signature ----------------------------------------------------------------------------  Electronically signed by Carina Newsome(Sonographer) on 09/09/2022 02:20  PM ---------------------------------------------------------------------------- ----------------------------------------------------------------------------  Electronically signed by Suleiman Ponce(Interpreting physician) on 09/10/2022  08:12 AM ---------------------------------------------------------------------------- FINDINGS Left Atrium Left atrium is normal in size. Left Ventricle Left ventricle is normal in size and wall thickness. Global left ventricular systolic function is normal. Estimated LV EF 60-65 %. No obvious wall motion abnormality seen. Right Atrium Right atrium is normal in size. Right Ventricle Normal right ventricular size and function. Mitral Valve No obvious valvular abnormality seen.  No evidence of mitral regurgitation. Aortic Valve No obvious valvular abnormality seen. No evidence of aortic insufficiency or stenosis. Tricuspid Valve Tricuspid valve was not well visualized. Insignificant tricuspid regurgitation, unable to estimate RVSP. Pulmonic Valve Pulmonic valve was not well visualized. No evidence of pulmonic insufficiency or stenosis. Pericardial Effusion No significant pericardial effusion is seen. Pleural Effusion No pleural effusion seen. Miscellaneous Normal aortic root dimension. IVC not well visualized. E/E' average = 8.5. M-mode / 2D Measurements & Calculations:   LVIDd:3.79 cm(3.7 - 5.6 cm)      Diastolic EJJCPR:67.2 ml  VFVCJ:2.03 cm(2.2 - 4.0 cm)      Systolic HTVKPR:5.16 ml  KZQM:6.17 cm(0.6 - 1.1 cm)       Aortic Root:3.6 cm(2.0 - 3.7 cm)  LVPWd:0.7 cm(0.6 - 1.1 cm)       LA Dimension: 2.3 cm(1.9 - 4.0 cm)  Fractional Shortenin.55 %    LA volume/Index: 33.7 ml /18m^2  Calculated LVEF (%): 86.38 %     LVOT:1.7 cm   Mitral:                              Aortic   Peak E-Wave: 0.75 m/s                Peak Velocity: 1.51 m/s  Peak A-Wave: 1.15 m/s                Mean Velocity: 0.86 m/s  E/A Ratio: 0.65                      Peak Gradient: 9.12 mmHg  Peak Gradient: 2.26 mmHg             Mean Gradient: 4 mmHg  Deceleration Time: 130 msec                                        Area (continuity): 2.26 cm^2                                       AV VTI: 20.6 cm  Septal Wall E' velocity:0.07 m/s Lateral Wall E' velocity:0.12 m/s    XR ABDOMEN (KUB) (SINGLE AP VIEW)    Result Date: 2022  EXAMINATION: ONE SUPINE XRAY VIEW(S) OF THE ABDOMEN 2022 9:33 am COMPARISON: 2022, 2022 HISTORY: ORDERING SYSTEM PROVIDED HISTORY: NGT placement, dark liquid coming through NG TECHNOLOGIST PROVIDED HISTORY: NGT placement, dark liquid coming through NG Reason for Exam: NGT placement, dark liquid coming through NG FINDINGS: Enteric tube coiled in the body of the stomach.   Nonobstructive bowel gas pattern. Mild stool burden. Multiple external devices project over the patient. No acute osseous abnormality identified. Enteric tube coiled at the level of the body of the stomach. Nonobstructive bowel gas pattern. CT HEAD WO CONTRAST    Result Date: 9/8/2022  EXAMINATION: CT OF THE HEAD WITHOUT CONTRAST  9/8/2022 11:38 am TECHNIQUE: CT of the head was performed without the administration of intravenous contrast. Automated exposure control, iterative reconstruction, and/or weight based adjustment of the mA/kV was utilized to reduce the radiation dose to as low as reasonably achievable. COMPARISON: MRI 08/04/2022, 08/03/2022 and CT 08/03/2022. HISTORY: ORDERING SYSTEM PROVIDED HISTORY: Mental Status Changes TECHNOLOGIST PROVIDED HISTORY: Mental Status Changes Decision Support Exception - unselect if not a suspected or confirmed emergency medical condition->Emergency Medical Condition (MA) Is the patient pregnant?->No Reason for Exam: AMS Additional signs and symptoms: AMS FINDINGS: BRAIN/VENTRICLES: There is no acute intracranial hemorrhage, mass effect or midline shift. No abnormal extra-axial fluid collection. Encephalomalacia in the left frontal lobe demonstrated corresponding to recently demonstrated ischemia. Cortical atrophy and chronic white matter changes in the brain and associated ventricular enlargement are again demonstrated. ORBITS: The visualized portion of the orbits demonstrate no acute abnormality. SINUSES: The visualized paranasal sinuses and mastoid air cells demonstrate no acute abnormality. SOFT TISSUES/SKULL:  No acute abnormality of the visualized skull or soft tissues. Chronic findings in the brain without acute CT abnormality identified. Expected evolution of recently demonstrated ischemia in the left frontal lobe. US LIVER    Result Date: 9/10/2022  EXAMINATION: RIGHT UPPER QUADRANT ULTRASOUND 9/10/2022 11:21 am COMPARISON: None.  HISTORY: ORDERING SYSTEM PROVIDED HISTORY: elevated lft TECHNOLOGIST PROVIDED HISTORY: elevated lft FINDINGS: LIVER:  The liver demonstrates normal echogenicity without evidence of intrahepatic biliary ductal dilatation. Hepatopetal flow portal vein. Liver 18.3 cm in length. BILIARY SYSTEM:  Gallstones in the gallbladder. Negative sonographic Gilliland's sign. Mild wall thickening at 4.6 mm. No pericholecystic fluid. Common bile duct is within normal limits measuring 6.8 mm. RIGHT KIDNEY: The right kidney is grossly unremarkable without evidence of hydronephrosis. PANCREAS:  Visualized portions of the pancreas are unremarkable. OTHER: No evidence of right upper quadrant ascites. Unremarkable ultrasound the liver. Cholelithiasis with mild gallbladder wall thickening. Negative sonographic Gilliland's sign. No pericholecystic fluid. Common duct upper normal at 6.8 mm. No intraductal stone demonstrated. Correlation clinical findings and laboratory values required. XR CHEST PORTABLE    Result Date: 9/8/2022  EXAMINATION: ONE XRAY VIEW OF THE CHEST 9/8/2022 7:53 pm COMPARISON: 08/17/2022 HISTORY: ORDERING SYSTEM PROVIDED HISTORY: line placement TECHNOLOGIST PROVIDED HISTORY: line placement Reason for Exam: Line placement FINDINGS: Central venous catheter tip overlies the right atrium. No pleural effusion or pneumothorax. Heart size is at the upper limits of normal.     Right central venous catheter tip overlies the right atrium. No pneumothorax. XR CHEST PORTABLE    Result Date: 8/17/2022  EXAMINATION: ONE XRAY VIEW OF THE CHEST 8/17/2022 8:09 pm COMPARISON: None. HISTORY: ORDERING SYSTEM PROVIDED HISTORY: chest pain TECHNOLOGIST PROVIDED HISTORY: chest pain FINDINGS: Chest radiograph: The cardiomediastinal silhouette and hilar contours are normal. The lungs are clear with no focal consolidation, pleural effusion or pneumothorax. The overlying soft tissue and osseous structures do not demonstrate acute abnormality.      No acute intrathoracic pathology. US RETROPERITONEAL COMPLETE    Result Date: 9/8/2022  EXAMINATION: RETROPERITONEAL ULTRASOUND OF THE KIDNEYS AND URINARY BLADDER 9/8/2022 COMPARISON: None HISTORY: ORDERING SYSTEM PROVIDED HISTORY: Acute kidney injury TECHNOLOGIST PROVIDED HISTORY: Acute kidney injury 61-year-old female with acute kidney injury FINDINGS: Kidneys: Right kidney measures 10.4 x 4.3 x 5.0 cm. Right renal cortical thickness measures 1.7 cm. Left kidney measures 9.4 x 4.8 x 4.8 cm. Left renal cortical thickness measures 1.8 cm. No hydronephrosis or perinephric fluid. No echogenic foci with posterior acoustic shadowing to suggest renal calculi. Gross preservation of the bilateral corticomedullary differentiation. Bladder: Not imaged. Unremarkable renal ultrasound. No hydronephrosis. Physical Examination:        Constitutional:       Appearance: She is ill-appearing. HENT:      Head: Normocephalic and atraumatic. Nose: No congestion or rhinorrhea. Eyes:      Extraocular Movements: Extraocular movements intact. Conjunctiva/sclera: Conjunctivae normal.      Pupils: Pupils are equal, round, and reactive to light. Cardiovascular:      Rate and Rhythm: Tachycardia present. Pulses: Normal pulses. Heart sounds: Normal heart sounds. No murmur heard. No friction rub. No gallop. Pulmonary:      Effort: Pulmonary effort is normal. No respiratory distress. Breath sounds: Normal breath sounds. No wheezing or rales. Abdominal:      General: Abdomen is flat. Bowel sounds are normal. There is no distension. Palpations: There is no mass. Tenderness: There is no guarding. Musculoskeletal:      Right lower leg: No edema. Left lower leg: No edema. Skin:     Capillary Refill: Capillary refill takes less than 2 seconds. Neurological:      General: No focal deficit present. Mental Status: She is alert but confused and disoriented.      Sensory: No sensory deficit. Motor: No weakness. Psychiatric: was not accessed due to altered mental status      Assessment:        Primary Problem  DKA, type 1, not at goal [facility])    Active Hospital Problems    Diagnosis Date Noted    Diabetic ketoacidosis with coma associated with type 1 diabetes mellitus ([facility] [E10.11] 09/13/2022     Priority: Medium    Coffee ground emesis [K92.0] 09/11/2022     Priority: Medium    Elevated LFTs [R79.89] 09/11/2022     Priority: Medium    Unspecified mood (affective) disorder ([facility] Jazmyn Petite 09/10/2022     Priority: Medium    Uremic encephalopathy [G93.49, N19] 09/09/2022     Priority: Medium    DKA, type 1, not at goal [facility]) [E10.10] 09/08/2022     Priority: Medium    DKA, type 2, not at goal [facility]) [E11.10] 09/08/2022     Priority: Medium    Acute GI bleeding [K92.2] 10/19/2018    Cocaine abuse ([facility] [F14.10] 01/05/2018       Plan:        DKA, 2/2 poor insulin compliance, resolving  - Glucose on admssion: 1,034; glucose today 80  - BMP q4h, initial Mag and Phos levels  - Glucose checks 4 times daily  - Home Lantus 30 units BID   - Home metformin on hold  -IV Normal Saline at 250 mL/hr  -Hypoglycemia, phos and potassium replacement protocols in place   -General surgery placed central line      Acute Kidney Injury pre-renal azotemia secondary to dehydration  -Baseline Cr 0.73;  Cr on admission: 3.56; Cr today 1.00  -Change IV NS infusion to 0.45 NS at 150 ml/hr  -BMP q4 hours  -monitor urine output  -Nephrology consulted      Suspicion for upper GI bleeding  -Coffee-ground emesis from his NG tube, eventually become bilious  -Globin had been steadily climbing since admission, currently is stable around 8  -GI on board  -PPI started  -Upper endoscopy is planned once electrolyte abnormalities resolve     Hypotension secondary to dehydration by osmotic diuresis  -BP today:113/61  -Free water deficit 10 L  -Continue NS infusion     Hypernatremia  -most recent Na+ : 147;   -Continue IV NS infusion to 0.45 NS at 150 ml/hr      Acute metabolic encephalopathy s/s to electrolyte abnormality  -Restraints in place; patient oriented to person only  -Nephrology on board; electrolyte replacement  -EEG ordered    -Restraints placed     Polysubstance abuse  -Urine tox positive for Fentanyl and cocaine  -Psychiatry consulted      Ventricular tachycardia  -Non-sustained VT secondary to electrolye imbalance, metabolic derangement and cocaine  -Continue to manage hyperglycemia and electrolytes  -Discontinue metoprolol  -Echo: Estimated LV EF 60-65        Depression with psychotic features  -Continue Haloperidol lactate 5 mg IM q 6 hours PRN  -Continue Mirtazapine 7.5 mg po nightly  -Continue Trazodone 150 mg po nightly  -Continue Venlafaxine 150 mg po daily  -Psychiatry consulted     Asthma  -Continue home Proventil ventolin 2 puff q 4 hours as needed   -Continue home Symbicort 2 puffs BID  -Continue home Flonase 1 spray each nostril daily      Code: Full code  Diet: Adult diet, 3 carbs  DVT prophylaxis: on hold        Sept 16,  Feeling much better  GI seen her, EGD soon, possible today, patient refused yesterday as she waited too long in the waiting room  Discharge plan to [facility] not a candidate for [facility] per psychiatrist  Acute on chronic anemia with some coffee-ground vomit but no significant drop will do Protonix  IV Venofer 1 dose  400 mg  Psych to comment on competency  Recheck labs CBC, BMP,           Priyanka Perez MD  9/16/2022  7:22 AM

## 2022-09-16 NOTE — PROGRESS NOTES
Patient called out that she is having severe abdominal pain & back pain. Page to attending physician. See orders for more details. Writer once again attempted to restart IVF as they were stopped during transfer. Patient once again refuses and states, \"I don't want none of that sh*t on me. I want this pain to go away\". See MAR for more details.

## 2022-09-16 NOTE — PROGRESS NOTES
Patient provided medical update and welcomed prayer; got medical update from [facility]     09/16/22 1931   Encounter Summary   Encounter Overview/Reason  Spiritual/Emotional Needs   Service Provided For: Patient   Referral/Consult From: Patient   Last Encounter  09/16/22   Complexity of Encounter Moderate   Spiritual/Emotional needs   Type Spiritual Support   Assessment/Intervention/Outcome   Assessment Impaired resilience; Powerlessness   Intervention Discussed illness injury and its impact; Explored/Affirmed feelings, thoughts, concerns;Prayer (assurance of)/[place];Sustaining Presence/Ministry of presence   Outcome Engaged in conversation;Expressed feelings, needs, and concerns;Expressed Gratitude;Receptive

## 2022-09-16 NOTE — CARE COORDINATION
ONGOING DISCHARGE PLAN:    Pt refused to talk to writer. LSW following for assist with locating family. Psych seeing patient to assess for competency. Restraints on this am.     Needs to have EGD but unable to consent. Will continue to follow for additional discharge needs.     Electronically signed by Ang Arora RN on 9/16/2022 at 3:49 PM

## 2022-09-16 NOTE — PLAN OF CARE
Problem: Safety - Adult  Goal: Free from fall injury  9/16/2022 0405 by Garland Iniguez RN  Outcome: Progressing  No falls noted this shift. Bed kept in low position. Safe environment maintained. Bedside table & call light in reach. Uses call light appropriately when needing assistance. Telesitter in room for patient safety. Problem: Pain  Goal: Verbalizes/displays adequate comfort level or baseline comfort level  9/16/2022 0405 by Garland Iniguez RN  Outcome: Progressing  No pain reported this shift. Problem: Skin/Tissue Integrity  Goal: Absence of new skin breakdown  Description: 1. Monitor for areas of redness and/or skin breakdown  2. Assess vascular access sites hourly  3. Every 4-6 hours minimum:  Change oxygen saturation probe site  4. Every 4-6 hours:  If on nasal continuous positive airway pressure, respiratory therapy assess nares and determine need for appliance change or resting period. 9/16/2022 0405 by Garland Iniguez RN  Outcome: Progressing  Patient refused complete skin assessment.     Problem: Chronic Conditions and Co-morbidities  Goal: Patient's chronic conditions and co-morbidity symptoms are monitored and maintained or improved  9/16/2022 0405 by Garland Iniguez RN  Outcome: Progressing     Problem: Nutrition Deficit:  Goal: Optimize nutritional status  9/16/2022 0405 by Garland Iniguez RN  Outcome: Progressing     Problem: Discharge Planning  Goal: Discharge to home or other facility with appropriate resources  9/16/2022 0405 by Garland Iniguez RN  Outcome: Progressing

## 2022-09-16 NOTE — PROGRESS NOTES
Patient underwent upper endoscopy today and was noted to have a clean-based duodenal ulcer. No active bleeding was seen. Gastric biopsies were obtained to rule out H. pylori. This is likely the cause of hematemesis and melena. Recommendations:  Recommend PPI twice daily for 6 to 8 weeks. Avoid NSAIDs. Advance diet. Okay to discharge from GI standpoint. GI will see on as-needed basis. Please call for any questions.

## 2022-09-16 NOTE — PROGRESS NOTES
[address]    PROGRESS NOTE             9/15/2022    8:56 PM    Name:   Karl Ace  MRN:     [id number]     Acct:      [de-identified]   Room:   2112/2112-01   Day:  7  Admit Date:  9/8/2022 11:01 AM    PCP:  Josh Seth MD  Code Status:  Full Code    Subjective:     C/C:   Chief Complaint   Patient presents with    Hyperglycemia    Altered Mental Status     Interval History Status: improved. Patient was seen and evaluated at bedside. Today electrolyte panel is within normal range except for potassium of 3.5, replacement protocol was initiated. This morning BG is 69. As per nurse, she is uncooperative and refuses to take her medications. Brief History:     The patient is a 56 y.o. Non- / non  female with a history of asthma, bipolar 1 disorder, hypertension, stroke, polysubstance abuse, DM 2, degenerative disc disease, and depression with psychotic features, who presented to the ED with altered mental status. The patient was found unresponsive at her home where she lives alone. The patient had not been taking her insulin as her refrigerator was filled with unused insulin vials. The patients family had not heard from the patient for several days and EMS was called. The patient was found confused and obtunded, hypotensive, and hyperglycemic. The ED, the patient was hypotensive (BP 86/64) and tachypneic (RR 24). Blood glucose was 1,034. Serum potassium was 5.5, bicarbonate was 19 mmol/L [anion gap 25 mmol/L). Cr was 3.56 (baseline Cr 0.73). Levophed was administered. The patient was admitted for management of DKA and DKA protocol was initiated    Review of Systems:     Reason unable to perform ROS: Pt is uncooperative      Medications: Allergies:     Allergies   Allergen Reactions    Bactrim [Sulfamethoxazole-Trimethoprim] Swelling    Adhesive Tape Rash       Current Meds:   Scheduled Meds: [START ON 9/16/2022] insulin glargine  10 Units SubCUTAneous BID    pregabalin  75 mg Oral BID    OLANZapine zydis  7.5 mg Oral Nightly    thiamine mononitrate  100 mg Oral Daily    folic acid  1 mg Oral Daily    pantoprazole (PROTONIX) 40 mg injection  40 mg IntraVENous BID    insulin lispro  0-4 Units SubCUTAneous TID     insulin lispro  0-4 Units SubCUTAneous Nightly    atorvastatin  40 mg Oral Nightly    budesonide-formoterol  2 puff Inhalation BID    [Held by provider] metFORMIN  1,000 mg Oral BID     mirtazapine  7.5 mg Oral Nightly    traZODone  150 mg Oral Nightly    venlafaxine  150 mg Oral Daily with breakfast    fluticasone  1 spray Each Nostril Daily     Continuous Infusions:    sodium chloride      sodium chloride Stopped (09/13/22 1312)    dextrose      dextrose       PRN Meds: sodium chloride, haloperidol, potassium chloride, magnesium sulfate, acetaminophen, glucose, dextrose bolus **OR** dextrose bolus, dextrose, glucagon (rDNA), dextrose, albuterol sulfate HFA, sodium phosphate IVPB **OR** sodium phosphate IVPB **OR** sodium phosphate IVPB, polyethylene glycol    Data:     Past Medical History:   has a past medical history of Acid reflux, Acute cystitis with hematuria, Acute non-recurrent maxillary sinusitis, Asthma, Bipolar 1 disorder ([address] Bipolar disorder, mixed ([address] BMI 34.0-34.9,adult, Cannabis use disorder, severe, dependence ([address] Cerebrovascular accident (CVA) ([address] Chest pain, Chronic renal insufficiency, Cocaine abuse ([address] COVID-19 virus RNA test result unknown, DDD (degenerative disc disease), cervical, Diabetes mellitus ([address] Dizziness, Fibromyalgia, History of stroke, Homicidal ideation, Hyperglycemia, Hypertension, Hypotension, IDDM (insulin dependent diabetes mellitus), Lupus ([address] Migraine, Neuropathy, Neuropathy, Polysubstance abuse ([address] Post traumatic stress disorder (PTSD), Posttraumatic stress disorder, Recurrent depression ([address] Recurrent major depressive disorder, in partial remission ([facility] Screening mammogram, encounter for, Severe recurrent major depression with psychotic features ([facility] Severe recurrent major depression without psychotic features ([facility] Stroke (cerebrum) ([facility] Stroke ([facility] Suicidal ideation, Suicidal intent, Vitamin D deficiency, and White matter changes. Social History:   reports that she has never smoked. She has never used smokeless tobacco. She reports that she does not currently use alcohol. She reports current drug use. Drugs: Marijuana (Weed) and Cocaine. Family History:   Family History   Problem Relation Age of Onset    Diabetes Mother     Stroke Mother     Diabetes Father     Diabetes Sister     Diabetes Brother     Other Son         GSW    No Known Problems Sister        Vitals:  /74   Pulse 100   Temp 98.9 °F (37.2 °C) (Oral)   Resp 18   Ht 5' 5\" (1.651 m)   Wt 205 lb 11 oz (93.3 kg)   LMP  (LMP Unknown)   SpO2 97%   PF (!) 15 L/min   BMI 34.23 kg/m²   Temp (24hrs), Av.3 °F (36.8 °C), Min:97.5 °F (36.4 °C), Max:98.9 °F (37.2 °C)    Recent Labs     09/15/22  0618 09/15/22  1157 09/15/22  1650 09/15/22  2034   POCGLU 203* 102 69 227*         I/O(24Hr):     Intake/Output Summary (Last 24 hours) at 9/15/2022 2056  Last data filed at 9/15/2022 1842  Gross per 24 hour   Intake --   Output 4500 ml   Net -4500 ml         Labs:    [unfilled]    Lab Results   Component Value Date/Time    SPECIAL NOT REPORTED 2022 12:47 PM     Lab Results   Component Value Date/Time    CULTURE NO GROWTH 5 DAYS 2022 12:18 PM       [unfilled]    Radiology:    ECHO Complete 2D W Doppler W Color    Result Date: 9/10/2022  [facility] Transthoracic Echocardiography Report (TTE)  Patient Name Zay Hartman    Date of Study               2022               [facility]   Date of      1965  Gender                      Female  Birth   Age          56 year(s)  Race                        Black   Room Number  2007 Height:                     65 inch, 165.1 cm   Corporate ID [id number]    Weight:                     180 pounds, 81.6 kg  #   Patient Acct [de-identified]   BSA:          1.89 m^2      BMI:      29.95  #                                                              kg/m^2   MR #         [id number]      Sonographer                 Carina Newsome   Accession #  [id number]  Interpreting Physician      [address]   Fellow                   Referring Nurse                           Practitioner   Interpreting             Referring Physician  Fellow  Type of Study   TTE procedure:2D Echocardiogram, M-Mode, Doppler, Color Doppler. Procedure Date Date: 09/09/2022 Start: 12:50 PM Study Location: [facility] Technical Quality: Fair visualization Indications:Tachycardia. Patient Status: Inpatient Height: 65 inches Weight: 180 pounds BSA: 1.89 m^2 BMI: 29.95 kg/m^2 Rhythm: Sinus tachycardia HR: 108 bpm BP: 98/55 mmHg CONCLUSIONS Summary Left ventricle is normal in size and wall thickness. Global left ventricular systolic function is normal. Estimated LV EF 60-65 %. No obvious wall motion abnormality seen. Left atrium is normal in size. No significant valvular regurgitation or stenosis seen. Signature ----------------------------------------------------------------------------  Electronically signed by Carina Newsmoe(Sonographer) on 09/09/2022 02:20  PM ---------------------------------------------------------------------------- ----------------------------------------------------------------------------  Electronically signed by Suleiman Ponce(Interpreting physician) on 09/10/2022  08:12 AM ---------------------------------------------------------------------------- FINDINGS Left Atrium Left atrium is normal in size. Left Ventricle Left ventricle is normal in size and wall thickness. Global left ventricular systolic function is normal. Estimated LV EF 60-65 %. No obvious wall motion abnormality seen.  Right Atrium Right atrium is normal in size. Right Ventricle Normal right ventricular size and function. Mitral Valve No obvious valvular abnormality seen. No evidence of mitral regurgitation. Aortic Valve No obvious valvular abnormality seen. No evidence of aortic insufficiency or stenosis. Tricuspid Valve Tricuspid valve was not well visualized. Insignificant tricuspid regurgitation, unable to estimate RVSP. Pulmonic Valve Pulmonic valve was not well visualized. No evidence of pulmonic insufficiency or stenosis. Pericardial Effusion No significant pericardial effusion is seen. Pleural Effusion No pleural effusion seen. Miscellaneous Normal aortic root dimension. IVC not well visualized. E/E' average = 8.5.  M-mode / 2D Measurements & Calculations:   LVIDd:3.79 cm(3.7 - 5.6 cm)      Diastolic RNMUMA:39.1 ml  BIMIW:9.90 cm(2.2 - 4.0 cm)      Systolic WOUHMF:4.22 ml  SAYL:6.05 cm(0.6 - 1.1 cm)       Aortic Root:3.6 cm(2.0 - 3.7 cm)  LVPWd:0.7 cm(0.6 - 1.1 cm)       LA Dimension: 2.3 cm(1.9 - 4.0 cm)  Fractional Shortenin.55 %    LA volume/Index: 33.7 ml /18m^2  Calculated LVEF (%): 86.38 %     LVOT:1.7 cm   Mitral:                              Aortic   Peak E-Wave: 0.75 m/s                Peak Velocity: 1.51 m/s  Peak A-Wave: 1.15 m/s                Mean Velocity: 0.86 m/s  E/A Ratio: 0.65                      Peak Gradient: 9.12 mmHg  Peak Gradient: 2.26 mmHg             Mean Gradient: 4 mmHg  Deceleration Time: 130 msec                                        Area (continuity): 2.26 cm^2                                       AV VTI: 20.6 cm  Septal Wall E' velocity:0.07 m/s Lateral Wall E' velocity:0.12 m/s    XR ABDOMEN (KUB) (SINGLE AP VIEW)    Result Date: 2022  EXAMINATION: ONE SUPINE XRAY VIEW(S) OF THE ABDOMEN 2022 9:33 am COMPARISON: 2022, 2022 HISTORY: ORDERING SYSTEM PROVIDED HISTORY: NGT placement, dark liquid coming through NG TECHNOLOGIST PROVIDED HISTORY: NGT placement, dark liquid coming through NG Reason for Exam: NGT placement, dark liquid coming through NG FINDINGS: Enteric tube coiled in the body of the stomach. Nonobstructive bowel gas pattern. Mild stool burden. Multiple external devices project over the patient. No acute osseous abnormality identified. Enteric tube coiled at the level of the body of the stomach. Nonobstructive bowel gas pattern. CT HEAD WO CONTRAST    Result Date: 9/8/2022  EXAMINATION: CT OF THE HEAD WITHOUT CONTRAST  9/8/2022 11:38 am TECHNIQUE: CT of the head was performed without the administration of intravenous contrast. Automated exposure control, iterative reconstruction, and/or weight based adjustment of the mA/kV was utilized to reduce the radiation dose to as low as reasonably achievable. COMPARISON: MRI 08/04/2022, 08/03/2022 and CT 08/03/2022. HISTORY: ORDERING SYSTEM PROVIDED HISTORY: Mental Status Changes TECHNOLOGIST PROVIDED HISTORY: Mental Status Changes Decision Support Exception - unselect if not a suspected or confirmed emergency medical condition->Emergency Medical Condition (MA) Is the patient pregnant?->No Reason for Exam: AMS Additional signs and symptoms: AMS FINDINGS: BRAIN/VENTRICLES: There is no acute intracranial hemorrhage, mass effect or midline shift. No abnormal extra-axial fluid collection. Encephalomalacia in the left frontal lobe demonstrated corresponding to recently demonstrated ischemia. Cortical atrophy and chronic white matter changes in the brain and associated ventricular enlargement are again demonstrated. ORBITS: The visualized portion of the orbits demonstrate no acute abnormality. SINUSES: The visualized paranasal sinuses and mastoid air cells demonstrate no acute abnormality. SOFT TISSUES/SKULL:  No acute abnormality of the visualized skull or soft tissues. Chronic findings in the brain without acute CT abnormality identified. Expected evolution of recently demonstrated ischemia in the left frontal lobe. US LIVER    Result Date: 9/10/2022  EXAMINATION: RIGHT UPPER QUADRANT ULTRASOUND 9/10/2022 11:21 am COMPARISON: None. HISTORY: ORDERING SYSTEM PROVIDED HISTORY: elevated lft TECHNOLOGIST PROVIDED HISTORY: elevated lft FINDINGS: LIVER:  The liver demonstrates normal echogenicity without evidence of intrahepatic biliary ductal dilatation. Hepatopetal flow portal vein. Liver 18.3 cm in length. BILIARY SYSTEM:  Gallstones in the gallbladder. Negative sonographic Gilliland's sign. Mild wall thickening at 4.6 mm. No pericholecystic fluid. Common bile duct is within normal limits measuring 6.8 mm. RIGHT KIDNEY: The right kidney is grossly unremarkable without evidence of hydronephrosis. PANCREAS:  Visualized portions of the pancreas are unremarkable. OTHER: No evidence of right upper quadrant ascites. Unremarkable ultrasound the liver. Cholelithiasis with mild gallbladder wall thickening. Negative sonographic Gilliland's sign. No pericholecystic fluid. Common duct upper normal at 6.8 mm. No intraductal stone demonstrated. Correlation clinical findings and laboratory values required. XR CHEST PORTABLE    Result Date: 9/8/2022  EXAMINATION: ONE XRAY VIEW OF THE CHEST 9/8/2022 7:53 pm COMPARISON: 08/17/2022 HISTORY: ORDERING SYSTEM PROVIDED HISTORY: line placement TECHNOLOGIST PROVIDED HISTORY: line placement Reason for Exam: Line placement FINDINGS: Central venous catheter tip overlies the right atrium. No pleural effusion or pneumothorax. Heart size is at the upper limits of normal.     Right central venous catheter tip overlies the right atrium. No pneumothorax. XR CHEST PORTABLE    Result Date: 8/17/2022  EXAMINATION: ONE XRAY VIEW OF THE CHEST 8/17/2022 8:09 pm COMPARISON: None. HISTORY: ORDERING SYSTEM PROVIDED HISTORY: chest pain TECHNOLOGIST PROVIDED HISTORY: chest pain FINDINGS: Chest radiograph:  The cardiomediastinal silhouette and hilar contours are normal. The lungs are clear with no focal consolidation, pleural effusion or pneumothorax. The overlying soft tissue and osseous structures do not demonstrate acute abnormality. No acute intrathoracic pathology. US RETROPERITONEAL COMPLETE    Result Date: 9/8/2022  EXAMINATION: RETROPERITONEAL ULTRASOUND OF THE KIDNEYS AND URINARY BLADDER 9/8/2022 COMPARISON: None HISTORY: ORDERING SYSTEM PROVIDED HISTORY: Acute kidney injury TECHNOLOGIST PROVIDED HISTORY: Acute kidney injury 29-year-old female with acute kidney injury FINDINGS: Kidneys: Right kidney measures 10.4 x 4.3 x 5.0 cm. Right renal cortical thickness measures 1.7 cm. Left kidney measures 9.4 x 4.8 x 4.8 cm. Left renal cortical thickness measures 1.8 cm. No hydronephrosis or perinephric fluid. No echogenic foci with posterior acoustic shadowing to suggest renal calculi. Gross preservation of the bilateral corticomedullary differentiation. Bladder: Not imaged. Unremarkable renal ultrasound. No hydronephrosis. Physical Examination:        Constitutional:       Appearance: She is ill-appearing. HENT:      Head: Normocephalic and atraumatic. Nose: No congestion or rhinorrhea. Eyes:      Extraocular Movements: Extraocular movements intact. Conjunctiva/sclera: Conjunctivae normal.      Pupils: Pupils are equal, round, and reactive to light. Cardiovascular:      Rate and Rhythm: Tachycardia present. Pulses: Normal pulses. Heart sounds: Normal heart sounds. No murmur heard. No friction rub. No gallop. Pulmonary:      Effort: Pulmonary effort is normal. No respiratory distress. Breath sounds: Normal breath sounds. No wheezing or rales. Abdominal:      General: Abdomen is flat. Bowel sounds are normal. There is no distension. Palpations: There is no mass. Tenderness: There is no guarding. Musculoskeletal:      Right lower leg: No edema. Left lower leg: No edema.    Skin:     Capillary Refill: Capillary refill takes less than 2 seconds. Neurological:      General: No focal deficit present. Mental Status: She is alert but confused and disoriented. Sensory: No sensory deficit. Motor: No weakness. Psychiatric: was not accessed due to altered mental status      Assessment:        Primary Problem  DKA, type 1, not at goal [facility])    Active Hospital Problems    Diagnosis Date Noted    Diabetic ketoacidosis with coma associated with type 1 diabetes mellitus ([facility] [E10.11] 09/13/2022     Priority: Medium    Coffee ground emesis [K92.0] 09/11/2022     Priority: Medium    Elevated LFTs [R79.89] 09/11/2022     Priority: Medium    Unspecified mood (affective) disorder ([facility] Elyssa Sands 09/10/2022     Priority: Medium    Uremic encephalopathy [G93.49, N19] 09/09/2022     Priority: Medium    DKA, type 1, not at goal [facility]) [E10.10] 09/08/2022     Priority: Medium    DKA, type 2, not at goal [facility]) [E11.10] 09/08/2022     Priority: Medium    Acute GI bleeding [K92.2] 10/19/2018    Cocaine abuse ([facility] [F14.10] 01/05/2018       Plan:        DKA, 2/2 poor insulin compliance, resolving  - Glucose on admssion: 1,034; glucose today 80  - BMP q4h, initial Mag and Phos levels  - Glucose checks 4 times daily  - Home Lantus 30 units BID   - Home metformin on hold  -IV Normal Saline at 250 mL/hr  -Hypoglycemia, phos and potassium replacement protocols in place   -General surgery placed central line      Acute Kidney Injury pre-renal azotemia secondary to dehydration  -Baseline Cr 0.73;  Cr on admission: 3.56; Cr today 1.00  -Change IV NS infusion to 0.45 NS at 150 ml/hr  -BMP q4 hours  -monitor urine output  -Nephrology consulted      Suspicion for upper GI bleeding  -Coffee-ground emesis from his NG tube, eventually become bilious  -Globin had been steadily climbing since admission, currently is stable around 8  -GI on board  -PPI started  -Upper endoscopy is planned once electrolyte abnormalities resolve     Hypotension secondary to dehydration by osmotic diuresis  -BP today:113/61  -Free water deficit 10 L  -Continue NS infusion     Hypernatremia  -most recent Na+ : 147;   -Continue IV NS infusion to 0.45 NS at 150 ml/hr      Acute metabolic encephalopathy s/s to electrolyte abnormality  -Restraints in place; patient oriented to person only  -Nephrology on board; electrolyte replacement  -EEG ordered    -Restraints placed     Polysubstance abuse  -Urine tox positive for Fentanyl and cocaine  -Psychiatry consulted      Ventricular tachycardia  -Non-sustained VT secondary to electrolye imbalance, metabolic derangement and cocaine  -Continue to manage hyperglycemia and electrolytes  -Discontinue metoprolol  -Echo: Estimated LV EF 60-65        Depression with psychotic features  -Continue Haloperidol lactate 5 mg IM q 6 hours PRN  -Continue Mirtazapine 7.5 mg po nightly  -Continue Trazodone 150 mg po nightly  -Continue Venlafaxine 150 mg po daily  -Psychiatry consulted     Asthma  -Continue home Proventil ventolin 2 puff q 4 hours as needed   -Continue home Symbicort 2 puffs BID  -Continue home Flonase 1 spray each nostril daily      Code: Full code  Diet: Adult diet, 3 carbs  DVT prophylaxis: on hold        Sept 15,  Feeling much better  GI seen her, EGD soon  Discharge plan to ECF not a candidate for BHI per psychiatrist  Acute on chronic anemia with some coffee-ground vomit but no significant drop will do Protonix  IV Venofer 1 dose  400 mg  Psych to comment on competency         Ira Louise MD  9/15/2022  8:56 PM

## 2022-09-16 NOTE — PROGRESS NOTES
Patient assisted to bathroom with minimal assistance. Patient back in bed with bed alarm in use and tele-sitter in place.

## 2022-09-16 NOTE — PROGRESS NOTES
Patient refusing morning vitals and blood sugar check. Yelling at CTA to [facility] away\".

## 2022-09-16 NOTE — ANESTHESIA PRE PROCEDURE
Department of Anesthesiology  Preprocedure Note       Name:  Leah Dykes   Age:  56 y.o.  :  1965                                          MRN:  [id number]         Date:  2022      Surgeon: Fede Phelan): Maria M Cain MD    Procedure: Procedure(s):  EGD BIOPSY    Medications prior to admission:   Prior to Admission medications    Medication Sig Start Date End Date Taking? Authorizing Provider   insulin glargine (LANTUS SOLOSTAR) 100 UNIT/ML injection pen Inject 30 Units into the skin 2 times daily 22   Adriel Spain MD   atorvastatin (LIPITOR) 40 MG tablet Take 1 tablet by mouth nightly 22  Adriel Spain MD   acetaminophen (TYLENOL) 500 MG tablet Take 2 tablets by mouth 3 times daily as needed for Pain  Patient not taking: Reported on 2022 3/15/22   Adriel Spain MD   fluticasone (FLONASE) 50 MCG/ACT nasal spray 1 spray by Each Nostril route daily 3/15/22   Adriel Spain MD   metFORMIN (GLUCOPHAGE) 1000 MG tablet Take 1 tablet by mouth 2 times daily (with meals) 3/15/22   Adriel Spain MD   mirtazapine (REMERON) 7.5 MG tablet Take 1 tablet by mouth nightly 3/15/22   Adriel Spain MD   traZODone (DESYREL) 150 MG tablet Take 1 tablet by mouth nightly 3/15/22   Adriel Spain MD   venlafaxine (EFFEXOR XR) 150 MG extended release capsule Take 1 capsule by mouth daily (with breakfast) 3/15/22   Adriel Spain MD   Alcohol Swabs 70 % PADS Use 1 swab 3 times daily as needed 3/15/22   Adriel Spain MD   blood glucose monitor strips Test 3 times a day & as needed for symptoms of irregular blood glucose. Dispense sufficient amount for indicated testing frequency plus additional to accommodate PRN testing needs.  3/15/22   Adriel Spain MD   Lancets MISC 1 each by Does not apply route 3 times daily 3/15/22   Adriel Spain MD   Insulin Pen Needle ([employer] PEN NEEDLES 31G) 31G X 8 MM MISC 1 each by Does not apply route daily 3/15/22   Adriel Spain, MD   FREESTYLE LITE strip 1 each by Does not apply route 3 times daily 3/15/22   Jose L Ramos MD   budesonide-formoterol (SYMBICORT) 80-4.5 MCG/ACT AERO Inhale 2 puffs into the lungs 2 times daily 3/15/22   Jose L Ramos MD   albuterol sulfate  (90 Base) MCG/ACT inhaler Inhale 2 puffs into the lungs every 4 hours as needed for Wheezing 3/15/22 4/15/22  Jose L Ramos MD   mupirocin (BACTROBAN) 2 % ointment Apply topically 3 times daily Apply topically to right foot wound two times a day for wound care. Apply to right foot wound, cover with Kerlix and ace wrap. Historical Provider, MD   Misc.  Devices MISC 1 PAIR OF DIABETIC SHOES (1 LEFT/ 1 RIGHT)  1-3 PAIRS OF INSERTS (LEFT/ RIGHT) 2/15/22   Bernard Kiran DPM   dicyclomine (BENTYL) 10 MG capsule Take 1 capsule by mouth 4 times daily 12/17/21   Jose L Ramos MD       Current medications:    Current Facility-Administered Medications   Medication Dose Route Frequency Provider Last Rate Last Admin    insulin glargine (LANTUS) injection vial 10 Units  10 Units SubCUTAneous BID Donovan Schilling MD        0.9 % sodium chloride infusion   IntraVENous PRN Carlos Duncan MD        pregabalin (LYRICA) capsule 75 mg  75 mg Oral BID Carlos Duncan MD   75 mg at 09/15/22 2033    OLANZapine zydis (ZYPREXA) disintegrating tablet 7.5 mg  7.5 mg Oral Nightly Carlos Duncan MD   7.5 mg at 09/15/22 2033    thiamine mononitrate tablet 100 mg  100 mg Oral Daily Carlos Duncan MD   100 mg at 80/71/95 1298    folic acid (FOLVITE) tablet 1 mg  1 mg Oral Daily Carlos Duncan MD   1 mg at 09/14/22 0949    haloperidol (HALDOL) tablet 1 mg  1 mg Oral Q6H PRN Carlos Duncan MD        0.9 % sodium chloride infusion   IntraVENous Continuous Carlos Duncan MD   Stopped at 09/13/22 1312    potassium chloride 10 mEq/100 mL IVPB (Peripheral Line)  10 mEq IntraVENous PRN Carlos Duncan MD   Stopped at 09/13/22 1312    magnesium sulfate 1000 mg in dextrose 5% 100 mL IVPB  1,000 mg IntraVENous PRN Chandra Shafer MD   Stopped at 09/12/22 2113    acetaminophen (TYLENOL) tablet 650 mg  650 mg Oral Q4H PRN Chandra Shafer MD   650 mg at 09/14/22 1015    pantoprazole (PROTONIX) 40 mg in sodium chloride (PF) 10 mL injection  40 mg IntraVENous BID Chandra Shafer MD   40 mg at 09/16/22 0736    insulin lispro (HUMALOG) injection vial 0-4 Units  0-4 Units SubCUTAneous TID  Chandra Shafer MD   2 Units at 09/14/22 1733    insulin lispro (HUMALOG) injection vial 0-4 Units  0-4 Units SubCUTAneous Nightly Chandra Shafer MD        glucose chewable tablet 16 g  4 tablet Oral PRN Chandra Shafre MD        dextrose bolus 10% 125 mL  125 mL IntraVENous PRN Chandra Shafer MD        Or    dextrose bolus 10% 250 mL  250 mL IntraVENous PRN Chandra Shafer MD        dextrose 10 % infusion   IntraVENous Continuous PRN Chandra Shafer MD        glucagon (rDNA) injection 1 mg  1 mg SubCUTAneous PRN Chandra Shafer MD        dextrose 10 % infusion   IntraVENous Continuous PRN Chandra Shafer MD        albuterol sulfate HFA (PROVENTIL;VENTOLIN;PROAIR) 108 (90 Base) MCG/ACT inhaler 2 puff  2 puff Inhalation Q4H PRN Chandra Shafer MD        atorvastatin (LIPITOR) tablet 40 mg  40 mg Oral Nightly Chandra Shafer MD   40 mg at 09/15/22 2033    budesonide-formoterol (SYMBICORT) 80-4.5 MCG/ACT inhaler 2 puff  2 puff Inhalation BID Chandra Shafer MD   2 puff at 09/14/22 0709    [Held by provider] metFORMIN (GLUCOPHAGE) tablet 1,000 mg  1,000 mg Oral BID  Jose Macedo MD        mirtazapine (REMERON) tablet 7.5 mg  7.5 mg Oral Nightly Chandra Shafer MD   7.5 mg at 09/15/22 2034    traZODone (DESYREL) tablet 150 mg  150 mg Oral Nightly Chandra Shafer MD   150 mg at 09/15/22 2033    venlafaxine (EFFEXOR XR) extended release capsule 150 mg  150 mg Oral Daily with breakfast Chandra Shafer MD   150 mg at 09/14/22 0949    fluticasone (FLONASE) 50 MCG/ACT nasal spray 1 spray  1 spray Each Nostril Daily Renae Beaulieu MD   1 spray at 09/11/22 0901    sodium phosphate 10 mmol in sodium chloride 0.9 % 250 mL IVPB  10 mmol IntraVENous PRN Renae Beaulieu MD   Stopped at 09/13/22 1311    Or    sodium phosphate 15 mmol in dextrose 5 % 250 mL IVPB  15 mmol IntraVENous PRN Renae Beaulieu MD   Stopped at 09/13/22 0125    Or    sodium phosphate 20 mmol in dextrose 5 % 500 mL IVPB  20 mmol IntraVENous PRN Renae Beaulieu MD        polyethylene glycol (GLYCOLAX) packet 17 g  17 g Oral Daily PRN Renae Beaulieu MD           Allergies: Allergies   Allergen Reactions    Bactrim [Sulfamethoxazole-Trimethoprim] Swelling    Adhesive Tape Rash       Problem List:    Patient Active Problem List   Diagnosis Code    IDDM (insulin dependent diabetes mellitus) [id number]    Lupus ([facility] M32.9    Post traumatic stress disorder (PTSD) F43.10    Acute cystitis with hematuria N30.01    Hyperglycemia due to diabetes mellitus ([facility] E11.65    Suicidal ideation R45.851    Arthritis M19.90    Chronic back pain M54.9, G89.29    Acid reflux K21.9    History of stroke Z86.73    Polysubstance abuse ([facility]) F19.10    Cocaine abuse ([facility]) F14.10    Chest pain R07.9    Acute non-recurrent maxillary sinusitis J01.00    Acute respiratory failure with hypercapnia ([facility]) J96.02    Alcohol use disorder, severe, dependence ([facility]) F10.20    Asthma exacerbation attacks J45. 901    Bilateral edema of lower extremity R60.0    Bipolar 1 disorder, depressed, severe ([facility]) F31.4    BMI 34.0-34.9,adult Z68.34    Cannabis use disorder, severe, dependence ([facility]) F12.20    Cocaine use disorder, severe, dependence ([facility]) F14.20    Dizziness R42    Dyslipidemia E78.5    Family history of colon cancer Z80.0    History of lupus [id number]    Homicidal ideation R45.850    Hypotension I95.9    Neuropathy G62.9    Absolute anemia D64.9    Recurrent depression (HCC) F33.9    Recurrent major depressive disorder, in partial remission ([facility]) F33.41    Severe recurrent major depression with psychotic features ([address] F33.3    Severe recurrent major depression without psychotic features ([address] F33.2    Acute GI bleeding K92.2    Vitamin D deficiency E55.9    Acute encephalopathy G93.40    Gastroesophageal reflux disease K21.9    Brain lesion G93.9    Rib lesion M89.9    Diabetes mellitus type 2 in obese ([facility]) E11.69, E66.9    YAEL (generalized anxiety disorder) F41.1    Posttraumatic stress disorder F43.10    Suicidal intent R45.851    Leg weakness, bilateral R29.898    Poorly controlled diabetes mellitus ([facility]) E11.65    Felon of finger L03.019    Osteomyelitis ([facility]) M86.9    Recurrent falls R29.6    Seasonal allergic rhinitis J30.2    Fibromyalgia M79.7    Tenosynovitis of finger M65.9    Open wound of right index finger S61.200A    Adverse effect of COVID-19 vaccine T50. B95A    Wound dehiscence T81.30XA    Amputation finger S68.119A    Abscess of right index finger L02.511    Type 2 diabetes mellitus without complication, without long-term current use of insulin ([facility]) E11.9    Major depression F32.9    Right foot infection L08.9    Cellulitis and abscess of toe of right foot L03.031, L02.611    Abscess of right foot L02.611    Bilateral cellulitis of lower leg related to related to  uncontrolled diabetes L03.116, L03.115    Bipolar disorder, current episode mixed, unspecified ([address] F31.60    Right foot ulcer, with fat layer exposed ([address] L97.512    Ulcer of left foot, with fat layer exposed ([address] L97.522    CRP elevated R79.82    Status post incision and drainage Z98.890    Intractable headache R51.9    Type 2 diabetes mellitus with diabetic autonomic neuropathy, with long-term current use of insulin ([facility]) E11.43, Z79.4    Somnolence R40.0    Cerebral infarction due to embolism of left middle cerebral artery ([address] I63.412    DKA, type 1, not at goal ([address] E10.10    DKA, type 2, not at goal [facility]) E11.10    Uremic encephalopathy G93.49, N19    Unspecified mood (affective) disorder (HCC) F39    Coffee ground emesis K92.0    Elevated LFTs R79.89    Diabetic ketoacidosis with coma associated with type 1 diabetes mellitus ([address] E10.11       Past Medical History:        Diagnosis Date    Acid reflux 5/29/2017    Acute cystitis with hematuria 10/11/2016    Acute non-recurrent maxillary sinusitis 11/28/2017    Asthma     Bipolar 1 disorder ([address] 1/4/2018    Bipolar disorder, mixed ([address]     BMI 34.0-34.9,adult 11/28/2017    Cannabis use disorder, severe, dependence ([address] 12/19/2017    Cerebrovascular accident (CVA) ([address] 6/14/2017    Chest pain 11/5/2016    Chronic renal insufficiency     Cocaine abuse ([address] 1/5/2018    COVID-19 virus RNA test result unknown     DDD (degenerative disc disease), cervical     Diabetes mellitus ([address]     Dizziness 6/13/2017    Fibromyalgia     History of stroke 9/9/2017    Homicidal ideation 11/6/2017    Hyperglycemia     Hypertension     Hypotension 1/18/2019    IDDM (insulin dependent diabetes mellitus) 12/21/2015    Lupus ([address]     Migraine     Neuropathy     Neuropathy     Polysubstance abuse ([address] 9/17/2017    Post traumatic stress disorder (PTSD)     Posttraumatic stress disorder 5/29/2017    Recurrent depression ([address] 5/29/2017    Recurrent major depressive disorder, in partial remission ([address] 11/28/2017    Screening mammogram, encounter for 11/28/2017    Severe recurrent major depression with psychotic features ([address] 12/19/2017    Severe recurrent major depression without psychotic features ([address] 12/19/2017    Stroke (cerebrum) ([address] 6/14/2017    Stroke ([address]     per chart notes    Suicidal ideation     Suicidal intent 3/10/2017    Vitamin D deficiency 11/28/2017    White matter changes 6/13/2017       Past Surgical History:        Procedure Laterality Date    ABDOMEN SURGERY      drain tube    ABSCESS DRAINAGE      right buttock    CATARACT REMOVAL WITH IMPLANT Bilateral     CHEST TUBE INSERTION      FINGER AMPUTATION Left 03/13/2021    LEFT RING FINER AMPUTATION performed by Danelle Batres MD at [facility] Right 10/11/2021    AMPUTATION RIGHT INDEX FINGER performed by Danelle Batres MD at [address] [place] [address] Right 1/27/2022    FOOT ABSCESS INCISION AND DRAINAGE  (PULSE LAVAGE, CULTURE SWABS) performed by Meena Celeste DPM at [place] [address]. Right 01/27/2022    FOOT ABSCESS INCISION AND DRAINAGE (PULSE LAVAGE, CULTURE SWABS) - Right    HAND SURGERY Right 09/14/2021    I&D INDEX FINGER performed by Danelle Batres MD at [address] [place] [address] Right 09/15/2021    I&D INDEX FINGER performed by Danelle Batres MD at [place] [address]  2/3/2022         LASIK Bilateral        Social History:    Social History     Tobacco Use    Smoking status: Never    Smokeless tobacco: Never   Substance Use Topics    Alcohol use: Not Currently                                Counseling given: Not Answered      Vital Signs (Current):   Vitals:    09/15/22 1600 09/15/22 2030 09/16/22 0030 09/16/22 0722   BP: 107/79 112/74  (!) 108/59   Pulse: (!) 101 100  93   Resp: 17 18  17   Temp: 97.9 °F (36.6 °C) 98.9 °F (37.2 °C)  97.7 °F (36.5 °C)   TempSrc:  Oral     SpO2: 99% 97%  96%   Weight:   204 lb 9.4 oz (92.8 kg)    Height:       PF:                                                  BP Readings from Last 3 Encounters:   09/16/22 (!) 108/59   08/17/22 (!) 141/122   08/04/22 89/77       NPO Status: Time of last liquid consumption: 2359                        Time of last solid consumption: 2359                        Date of last liquid consumption: 09/14/22                        Date of last solid food consumption: 09/14/22    BMI:   Wt Readings from Last 3 Encounters:   09/16/22 204 lb 9.4 oz (92.8 kg)   08/03/22 240 lb (108.9 kg)   04/22/22 236 lb (107 kg)     Body mass index is 34.05 kg/m².     CBC:   Lab Results   Component Value Date/Time    WBC 8.3 09/16/2022 07:59 AM    RBC 2.40 09/16/2022 07:59 AM    HGB 7.4 09/16/2022 07:59 AM    HCT 22.7 09/16/2022 07:59 AM    MCV 94.8 09/16/2022 07:59 AM    RDW 13.7 09/16/2022 07:59 AM     09/16/2022 07:59 AM       CMP:   Lab Results   Component Value Date/Time     09/16/2022 07:59 AM    K 4.0 09/16/2022 07:59 AM     09/16/2022 07:59 AM    CO2 26 09/16/2022 07:59 AM    BUN 7 09/16/2022 07:59 AM    CREATININE 0.75 09/16/2022 07:59 AM    GFRAA >60 09/16/2022 07:59 AM    LABGLOM >60 09/16/2022 07:59 AM    GLUCOSE 212 09/16/2022 07:59 AM    PROT 8.9 09/08/2022 11:24 AM    CALCIUM 7.8 09/16/2022 07:59 AM    BILITOT 0.4 09/08/2022 11:24 AM    ALKPHOS 151 09/08/2022 11:24 AM    AST 11 09/08/2022 11:24 AM    ALT 15 09/08/2022 11:24 AM       POC Tests:   Recent Labs     09/16/22  0743   POCGLU 185*       Coags:   Lab Results   Component Value Date/Time    PROTIME 10.7 08/04/2022 12:21 AM    INR 1.0 08/04/2022 12:21 AM    APTT 23.2 08/04/2022 12:21 AM       HCG (If Applicable): No results found for: PREGTESTUR, PREGSERUM, HCG, HCGQUANT     ABGs:   Lab Results   Component Value Date/Time    PHART 7.365 09/09/2022 11:05 AM    PO2ART 76.4 09/09/2022 11:05 AM    LSZ2BDL 31.6 09/09/2022 11:05 AM    RBO0QMS 18.0 09/09/2022 11:05 AM    Z5GLRGDX 94.2 09/09/2022 11:05 AM        Type & Screen (If Applicable):  No results found for: LABABO, LABRH    Drug/Infectious Status (If Applicable):  Lab Results   Component Value Date/Time    HEPCAB NONREACTIVE 09/10/2022 11:27 AM       COVID-19 Screening (If Applicable):   Lab Results   Component Value Date/Time    COVID19 Not Detected 01/22/2022 11:03 PM           Anesthesia Evaluation  Patient summary reviewed and Nursing notes reviewed no history of anesthetic complications:   Airway: Mallampati: II  TM distance: >3 FB   Neck ROM: full  Mouth opening: > = 3 FB   Dental: normal exam         Pulmonary: breath sounds clear to auscultation  (+) asthma:                            Cardiovascular:    (+) hypertension:,       ECG reviewed  Rhythm: regular  Rate: normal  Echocardiogram reviewed               ROS comment: Echo: 9/22    Left ventricle is normal in size and wall thickness. Global left ventricular systolic function is normal. Estimated LV EF 60-65  %. No obvious wall motion abnormality seen. Left atrium is normal in size. No significant valvular regurgitation or stenosis seen. Neuro/Psych:   (+) CVA:, neuromuscular disease:, headaches:, psychiatric history:            GI/Hepatic/Renal:   (+) GERD:,           Endo/Other:    (+) DiabetesType II DM, using insulin, : arthritis:., .                 Abdominal:             Vascular: Other Findings:           Anesthesia Plan      general     ASA 3       Induction: intravenous. Anesthetic plan and risks discussed with patient. Plan discussed with CRNA.                     Yue Ibarra MD   9/16/2022

## 2022-09-17 LAB
ABSOLUTE EOS #: 0.2 K/UL (ref 0–0.4)
ABSOLUTE LYMPH #: 2.4 K/UL (ref 1–4.8)
ABSOLUTE MONO #: 1.1 K/UL (ref 0.1–1.3)
ALBUMIN SERPL-MCNC: 2.3 G/DL (ref 3.5–5.2)
ALP BLD-CCNC: 150 U/L (ref 35–104)
ALT SERPL-CCNC: 8 U/L (ref 5–33)
ANION GAP SERPL CALCULATED.3IONS-SCNC: 9 MMOL/L (ref 9–17)
AST SERPL-CCNC: 9 U/L
BASOPHILS # BLD: 1 % (ref 0–2)
BASOPHILS ABSOLUTE: 0 K/UL (ref 0–0.2)
BILIRUB SERPL-MCNC: 0.3 MG/DL (ref 0.3–1.2)
BUN BLDV-MCNC: 6 MG/DL (ref 6–20)
CALCIUM SERPL-MCNC: 8 MG/DL (ref 8.6–10.4)
CHLORIDE BLD-SCNC: 102 MMOL/L (ref 98–107)
CO2: 25 MMOL/L (ref 20–31)
CREAT SERPL-MCNC: 0.68 MG/DL (ref 0.5–0.9)
EOSINOPHILS RELATIVE PERCENT: 2 % (ref 0–4)
GFR AFRICAN AMERICAN: >60 ML/MIN
GFR NON-AFRICAN AMERICAN: >60 ML/MIN
GFR SERPL CREATININE-BSD FRML MDRD: ABNORMAL ML/MIN/{1.73_M2}
GLUCOSE BLD-MCNC: 223 MG/DL (ref 65–105)
GLUCOSE BLD-MCNC: 269 MG/DL (ref 70–99)
GLUCOSE BLD-MCNC: 326 MG/DL (ref 65–105)
GLUCOSE BLD-MCNC: 372 MG/DL (ref 65–105)
HCT VFR BLD CALC: 23.2 % (ref 36–46)
HEMOGLOBIN: 7.6 G/DL (ref 12–16)
LYMPHOCYTES # BLD: 26 % (ref 24–44)
MCH RBC QN AUTO: 31.1 PG (ref 26–34)
MCHC RBC AUTO-ENTMCNC: 32.7 G/DL (ref 31–37)
MCV RBC AUTO: 95 FL (ref 80–100)
MONOCYTES # BLD: 12 % (ref 1–7)
PDW BLD-RTO: 13.4 % (ref 11.5–14.9)
PLATELET # BLD: 393 K/UL (ref 150–450)
PMV BLD AUTO: 8.7 FL (ref 6–12)
POTASSIUM SERPL-SCNC: 4 MMOL/L (ref 3.7–5.3)
RBC # BLD: 2.45 M/UL (ref 4–5.2)
SEG NEUTROPHILS: 59 % (ref 36–66)
SEGMENTED NEUTROPHILS ABSOLUTE COUNT: 5.6 K/UL (ref 1.3–9.1)
SODIUM BLD-SCNC: 136 MMOL/L (ref 135–144)
TOTAL PROTEIN: 6.1 G/DL (ref 6.4–8.3)
WBC # BLD: 9.4 K/UL (ref 3.5–11)

## 2022-09-17 PROCEDURE — 6370000000 HC RX 637 (ALT 250 FOR IP): Performed by: INTERNAL MEDICINE

## 2022-09-17 PROCEDURE — 99232 SBSQ HOSP IP/OBS MODERATE 35: CPT | Performed by: INTERNAL MEDICINE

## 2022-09-17 PROCEDURE — 6370000000 HC RX 637 (ALT 250 FOR IP): Performed by: STUDENT IN AN ORGANIZED HEALTH CARE EDUCATION/TRAINING PROGRAM

## 2022-09-17 PROCEDURE — 85025 COMPLETE CBC W/AUTO DIFF WBC: CPT

## 2022-09-17 PROCEDURE — A4216 STERILE WATER/SALINE, 10 ML: HCPCS | Performed by: INTERNAL MEDICINE

## 2022-09-17 PROCEDURE — 2580000003 HC RX 258: Performed by: INTERNAL MEDICINE

## 2022-09-17 PROCEDURE — 1200000000 HC SEMI PRIVATE

## 2022-09-17 PROCEDURE — C9113 INJ PANTOPRAZOLE SODIUM, VIA: HCPCS | Performed by: INTERNAL MEDICINE

## 2022-09-17 PROCEDURE — 82947 ASSAY GLUCOSE BLOOD QUANT: CPT

## 2022-09-17 PROCEDURE — 80053 COMPREHEN METABOLIC PANEL: CPT

## 2022-09-17 PROCEDURE — 6360000002 HC RX W HCPCS: Performed by: INTERNAL MEDICINE

## 2022-09-17 PROCEDURE — 36415 COLL VENOUS BLD VENIPUNCTURE: CPT

## 2022-09-17 RX ADMIN — PREGABALIN 75 MG: 75 CAPSULE ORAL at 08:48

## 2022-09-17 RX ADMIN — VENLAFAXINE HYDROCHLORIDE 150 MG: 150 CAPSULE, EXTENDED RELEASE ORAL at 08:48

## 2022-09-17 RX ADMIN — MIRTAZAPINE 7.5 MG: 15 TABLET, FILM COATED ORAL at 20:55

## 2022-09-17 RX ADMIN — ATORVASTATIN CALCIUM 40 MG: 40 TABLET, FILM COATED ORAL at 20:55

## 2022-09-17 RX ADMIN — PREGABALIN 75 MG: 75 CAPSULE ORAL at 20:55

## 2022-09-17 RX ADMIN — TRAZODONE HYDROCHLORIDE 150 MG: 100 TABLET ORAL at 20:55

## 2022-09-17 RX ADMIN — OLANZAPINE 7.5 MG: 15 TABLET, ORALLY DISINTEGRATING ORAL at 20:57

## 2022-09-17 RX ADMIN — FOLIC ACID 1 MG: 1 TABLET ORAL at 08:48

## 2022-09-17 RX ADMIN — MIRTAZAPINE 7.5 MG: 15 TABLET, FILM COATED ORAL at 00:29

## 2022-09-17 RX ADMIN — INSULIN LISPRO 3 UNITS: 100 INJECTION, SOLUTION INTRAVENOUS; SUBCUTANEOUS at 16:23

## 2022-09-17 RX ADMIN — SODIUM CHLORIDE 40 MG: 9 INJECTION INTRAMUSCULAR; INTRAVENOUS; SUBCUTANEOUS at 00:30

## 2022-09-17 RX ADMIN — INSULIN GLARGINE 10 UNITS: 100 INJECTION, SOLUTION SUBCUTANEOUS at 00:47

## 2022-09-17 RX ADMIN — THIAMINE HCL TAB 100 MG 100 MG: 100 TAB at 08:48

## 2022-09-17 RX ADMIN — TRAZODONE HYDROCHLORIDE 150 MG: 100 TABLET ORAL at 00:28

## 2022-09-17 RX ADMIN — INSULIN LISPRO 4 UNITS: 100 INJECTION, SOLUTION INTRAVENOUS; SUBCUTANEOUS at 00:47

## 2022-09-17 RX ADMIN — ATORVASTATIN CALCIUM 40 MG: 40 TABLET, FILM COATED ORAL at 00:29

## 2022-09-17 RX ADMIN — METFORMIN HYDROCHLORIDE 1000 MG: 1000 TABLET ORAL at 16:24

## 2022-09-17 NOTE — CARE COORDINATION
CLAYTON spoke with daughter Colton Coleman about discharge plans. Ananya informed CLAYTON that she is patients power of . CLAYTON requested documents be faxed to e-mail so that they can be added to patients chart. CLAYTON provided Colton Coleman with an appropriate e mail to send documents to. Mother has been a diabetic since Colton Coleman was a kid. Ananya reported that patient used to do insulin when it was in the [place]". Colton Coleman is agreeable to make medical decisions but does not want full-time care giver. Ananya reported that patient lives in a \"supportive living community\". Additional support is there but do not come inside patients apartment. Patient apparently lives in [facility]-- [address] Per daughter patient has not been paying her rent and was at risk of getting evicted. [employer] has been paying patients bills. Colton Coleman reported that patient goes on \"drug binges\". Colton Coleman reported that she would have to contact the complex on Monday to see if patient is caught up on rent or still able to reside there    Documents have been received. It appears as Colton Coleman is patients FINANCIAL POWER OF . CLAYTON called Colotn Coleman and informed her that she would have to be medical power of  for patient. CLAYTON explained that patient would have to be agreeable to sign those documents first, and then Colton Coleman would have to come to hospital to sign the documents as well. Colton Coleman does live in [place], and would be agreeable to come in to sign documentation if needed. CLAYTON requested spiritual care to contact CLAYTON to discuss patients options with medical power of . Spoke with [facility] [place] spiritual care and Nataly reported that the medical power of  will have to be a majority agreement with ALL of patients children for medical power of . Nataly reported that she will contact Ananya to discuss medical power of  plans. CLAYTON requested a call back after discussion with Ananya.      Electronically signed by PATRICIA Almaraz on 9/17/2022 at 11:44 AM

## 2022-09-17 NOTE — CARE COORDINATION
ONGOING DISCHARGE PLAN:    Patient is alert and oriented to person. Pt spoke to writer nad said\" are you the one from yesterday ? If so you about to get tossed out\". Writer explained that I am a  and offered to talk to patient . Pt stated she \"have many things to say in my body but I cant say them out loud\". CM told patient that I was willing to listen and she said \" it wont come out\". Pt stated she was going to call her Dtr Neptali Ling in a little bit. CM asked if she could help her call and she said \"no\". Patient then laid back on bed and closed her eyes. Writer left the room. Will continue to follow for additional discharge needs.     Electronically signed by Demi Justin RN on 9/17/2022 at 10:41 AM

## 2022-09-17 NOTE — PROGRESS NOTES
Writer attempted to get afternoon set of vitals on patient. Patient verbally aggressive towards staff and would not let us take vitals.

## 2022-09-17 NOTE — PLAN OF CARE
Problem: Skin/Tissue Integrity  Goal: Absence of new skin breakdown  Description: 1. Monitor for areas of redness and/or skin breakdown  2. Assess vascular access sites hourly  3. Every 4-6 hours minimum:  Change oxygen saturation probe site  4. Every 4-6 hours:  If on nasal continuous positive airway pressure, respiratory therapy assess nares and determine need for appliance change or resting period.   9/17/2022 0323 by Fred Govea RN  Outcome: Progressing  Note: Pt absent of any new skin breakdown     Problem: ABCDS Injury Assessment  Goal: Absence of physical injury  9/17/2022 0323 by Fred Govea RN  Outcome: Progressing  Note: Pt absent of any physical injury     Problem: Safety - Adult  Goal: Free from fall injury  9/17/2022 0323 by Fred Govea RN  Outcome: Progressing  Note: Pt free from falls

## 2022-09-17 NOTE — PROGRESS NOTES
Writer attempted to administer oral Metformin to patient. Patient was agreeabl;e to take oral metformin. Writer pulled medication and returned to room to administer. When writer entered room, patient immediately stated \"You better get out of her, how many times do I have to tell you. \" Writer attempted to console patient and inform patient that she had agreed to take medication. Patient stated \"I dont care leave me alone. \" Writer left room.

## 2022-09-17 NOTE — PROGRESS NOTES
Patient was agitated but calmed when writer said she was a  and welcomed prayer. 09/17/22 1144   Encounter Summary   Encounter Overview/Reason  Spiritual/Emotional Needs   Service Provided For: Patient   Referral/Consult From: Zac; Other    Last Encounter  09/17/22   Complexity of Encounter Low   Spiritual/Emotional needs   Type Spiritual Support   Assessment/Intervention/Outcome   Assessment Compromised coping   Intervention Active listening;Explored/Affirmed feelings, thoughts, concerns;Prayer (assurance of)/[place];Sustaining Presence/Ministry of presence   Outcome Engaged in conversation;Receptive

## 2022-09-17 NOTE — PROGRESS NOTES
Patient blood sugar is 372. Writer administered insulin per sliding scale. Writer misread sliding scale and only administered 3 units instead of 4. Patient refused additional 1 unit. Writer called Dr. Torsten Wilhelm to notify of blood sugar level and to inform him of mistake. Per Dr. Torsten Wilhelm, Shilo Trevino is okay, recheck blood sugar in 4 hours. \"

## 2022-09-17 NOTE — CARE COORDINATION
SW spent several minutes speaking with patient about discharge plans. Patient was sharing several stories. SW asked patient if she had any friends or relatives involved in her care. Patient reported that there are several people. SW asked patient if the name \"milton\" sounded familiar. Patient reported that \"milton\" is her daughter. SW asked patient if SW could contact daughter and discuss discharge plans. Patient wanted clarity about the aspects of the discharge plans that will be discussed with daughter. SW informed patient that the conversation would be about patients discharge location after she leaves the hospital. SW informed patient that she has to be able to care for herself in order to return home. SW asked patient if she was able to walk or bathe herself. Patient reported that she sometimes she can, and then other times she can not. SW explained to patient that she has the option to go to a facility and get rehab, or have visiting nurse services come to home. Patient became tearful and reported that she wants to go home. At that time, patient was agreeable to have visiting nurse services come to the home. Patient stated, I need someone to help me \"gracefully\" get in home. SW had patient elaborate. Patient reported that her home was \"tore\" from before she admitted to hospital. SW asked for clarity and asked patient if her home was \"tore\" up because of clutter or frustration/anger. Patient quietly informed SW that some of it was because of anger. SW asked patient if she had permission to discuss discharge plans with daughter Kaylie Zuleta. Patient stated, \"you have my permission to call Milton\". SW attempted to assist patient with calling Milton herself, but the phone was not working. Patient requested when SW speak with [facility] to have Kaylie uZleta give patient a call on her room phone. Patient did threaten to rip out her catheter multiple times. SW informed bedside nurse Vivian Vieira RN of this information. Electronically signed by PATRICIA Pina on 9/17/2022 at 11:04 AM

## 2022-09-17 NOTE — PROGRESS NOTES
[address]    PROGRESS NOTE             9/17/2022    9:16 AM    Name:   Allegra Benoit  MRN:     [id number]     Acct:      [de-identified]   Room:   2047/2047-01  IP Day:  9  Admit Date:  9/8/2022 11:01 AM    PCP:  Lena Wells MD  Code Status:  Full Code    Subjective:     C/C:   Chief Complaint   Patient presents with    Hyperglycemia    Altered Mental Status     Interval History Status: improved. Patient was seen and evaluated at bedside. More pleasant,  Patient refused EGD yesterday because she was waiting for 90      Brief History:     The patient is a 56 y.o. Non- / non  female with a history of asthma, bipolar 1 disorder, hypertension, stroke, polysubstance abuse, DM 2, degenerative disc disease, and depression with psychotic features, who presented to the ED with altered mental status. The patient was found unresponsive at her home where she lives alone. The patient had not been taking her insulin as her refrigerator was filled with unused insulin vials. The patients family had not heard from the patient for several days and EMS was called. The patient was found confused and obtunded, hypotensive, and hyperglycemic. The ED, the patient was hypotensive (BP 86/64) and tachypneic (RR 24). Blood glucose was 1,034. Serum potassium was 5.5, bicarbonate was 19 mmol/L [anion gap 25 mmol/L). Cr was 3.56 (baseline Cr 0.73). Levophed was administered. The patient was admitted for management of DKA and DKA protocol was initiated    Review of Systems:     Reason unable to perform ROS: Pt is uncooperative      Medications: Allergies:     Allergies   Allergen Reactions    Bactrim [Sulfamethoxazole-Trimethoprim] Swelling    Adhesive Tape Rash       Current Meds:   Scheduled Meds:    pregabalin  75 mg Oral BID    OLANZapine zydis  7.5 mg Oral Nightly    thiamine mononitrate  100 mg Oral Daily    folic acid  1 mg Oral Daily    pantoprazole (PROTONIX) 40 mg injection  40 mg IntraVENous BID    insulin lispro  0-4 Units SubCUTAneous TID     insulin lispro  0-4 Units SubCUTAneous Nightly    atorvastatin  40 mg Oral Nightly    budesonide-formoterol  2 puff Inhalation BID    metFORMIN  1,000 mg Oral BID WC    mirtazapine  7.5 mg Oral Nightly    traZODone  150 mg Oral Nightly    venlafaxine  150 mg Oral Daily with breakfast    fluticasone  1 spray Each Nostril Daily     Continuous Infusions:    sodium chloride      sodium chloride Stopped (09/16/22 1833)    dextrose      dextrose       PRN Meds: sodium chloride, haloperidol, potassium chloride, magnesium sulfate, acetaminophen, glucose, dextrose bolus **OR** dextrose bolus, dextrose, glucagon (rDNA), dextrose, albuterol sulfate HFA, sodium phosphate IVPB **OR** sodium phosphate IVPB **OR** sodium phosphate IVPB, polyethylene glycol    Data:     Past Medical History:   has a past medical history of Acid reflux, Acute cystitis with hematuria, Acute non-recurrent maxillary sinusitis, Asthma, Bipolar 1 disorder ([address] Bipolar disorder, mixed ([address] BMI 34.0-34.9,adult, Cannabis use disorder, severe, dependence ([address] Cerebrovascular accident (CVA) ([address] Chest pain, Chronic renal insufficiency, Cocaine abuse ([address] COVID-19 virus RNA test result unknown, DDD (degenerative disc disease), cervical, Diabetes mellitus ([address] Dizziness, Fibromyalgia, History of stroke, Homicidal ideation, Hyperglycemia, Hypertension, Hypotension, IDDM (insulin dependent diabetes mellitus), Lupus ([address] Migraine, Neuropathy, Neuropathy, Polysubstance abuse ([address] Post traumatic stress disorder (PTSD), Posttraumatic stress disorder, Recurrent depression ([address] Recurrent major depressive disorder, in partial remission ([address] Screening mammogram, encounter for, Severe recurrent major depression with psychotic features ([address] Severe recurrent major depression without psychotic features ([address] Stroke (cerebrum) [facility]), Stroke [facility]), Suicidal ideation, Suicidal intent, Vitamin D deficiency, and White matter changes. Social History:   reports that she has never smoked. She has never used smokeless tobacco. She reports that she does not currently use alcohol. She reports current drug use. Drugs: Marijuana (Weed) and Cocaine. Family History:   Family History   Problem Relation Age of Onset    Diabetes Mother     Stroke Mother     Diabetes Father     Diabetes Sister     Diabetes Brother     Other Son         GSW    No Known Problems Sister        Vitals:  BP (!) 98/51   Pulse 92   Temp 99.1 °F (37.3 °C)   Resp 20   Ht 5' 5\" (1.651 m)   Wt 207 lb 10.8 oz (94.2 kg)   LMP  (LMP Unknown)   SpO2 95%   PF (!) 15 L/min   BMI 34.56 kg/m²   Temp (24hrs), Av °F (36.7 °C), Min:96.9 °F (36.1 °C), Max:99.3 °F (37.4 °C)    Recent Labs     22  1646 22  0028 22  0847   POCGLU 84 220* 326* 223*         I/O(24Hr):     Intake/Output Summary (Last 24 hours) at 2022 0916  Last data filed at 2022 0615  Gross per 24 hour   Intake 750 ml   Output 2800 ml   Net -2050 ml         Labs:    [unfilled]    Lab Results   Component Value Date/Time    SPECIAL NOT REPORTED 2022 12:47 PM     Lab Results   Component Value Date/Time    CULTURE NO GROWTH 5 DAYS 2022 12:18 PM       [unfilled]    Radiology:    ECHO Complete 2D W Doppler W Color    Result Date: 9/10/2022  Formerly [facility] Transthoracic Echocardiography Report (TTE)  Patient Name Trina Galvez    Date of Study               2022               [facility]   Date of      1965  Gender                      Female  Birth   Age          56 year(s)  Race                        Black   Room Number          Height:                     65 inch, 165.1 cm   Corporate ID [id number]    Weight:                     180 pounds, 81.6 kg  #   Patient Acct [de-identified]   BSA:          1.89 m^2      BMI:      29.95  # kg/m^2   MR #         [id number]      Sonographer                 Carina Newsome   Accession #  [id number]  Interpreting Physician      [facility]   Fellow                   Referring Nurse                           Practitioner   Interpreting             Referring Physician  Fellow  Type of Study   TTE procedure:2D Echocardiogram, M-Mode, Doppler, Color Doppler. Procedure Date Date: 09/09/2022 Start: 12:50 PM Study Location: [address] [place] [address] Technical Quality: Fair visualization Indications:Tachycardia. Patient Status: Inpatient Height: 65 inches Weight: 180 pounds BSA: 1.89 m^2 BMI: 29.95 kg/m^2 Rhythm: Sinus tachycardia HR: 108 bpm BP: 98/55 mmHg CONCLUSIONS Summary Left ventricle is normal in size and wall thickness. Global left ventricular systolic function is normal. Estimated LV EF 60-65 %. No obvious wall motion abnormality seen. Left atrium is normal in size. No significant valvular regurgitation or stenosis seen. Signature ----------------------------------------------------------------------------  Electronically signed by Carina Newsome(Sonographer) on 09/09/2022 02:20  PM ---------------------------------------------------------------------------- ----------------------------------------------------------------------------  Electronically signed by Suleiman Ponce(Interpreting physician) on 09/10/2022  08:12 AM ---------------------------------------------------------------------------- FINDINGS Left Atrium Left atrium is normal in size. Left Ventricle Left ventricle is normal in size and wall thickness. Global left ventricular systolic function is normal. Estimated LV EF 60-65 %. No obvious wall motion abnormality seen. Right Atrium Right atrium is normal in size. Right Ventricle Normal right ventricular size and function. Mitral Valve No obvious valvular abnormality seen. No evidence of mitral regurgitation.  Aortic Valve No obvious valvular abnormality seen. No evidence of aortic insufficiency or stenosis. Tricuspid Valve Tricuspid valve was not well visualized. Insignificant tricuspid regurgitation, unable to estimate RVSP. Pulmonic Valve Pulmonic valve was not well visualized. No evidence of pulmonic insufficiency or stenosis. Pericardial Effusion No significant pericardial effusion is seen. Pleural Effusion No pleural effusion seen. Miscellaneous Normal aortic root dimension. IVC not well visualized. E/E' average = 8.5. M-mode / 2D Measurements & Calculations:   LVIDd:3.79 cm(3.7 - 5.6 cm)      Diastolic NSNDXW:30.0 ml  SGOEP:0.64 cm(2.2 - 4.0 cm)      Systolic ELQECC:9.41 ml  ZMQK:0.64 cm(0.6 - 1.1 cm)       Aortic Root:3.6 cm(2.0 - 3.7 cm)  LVPWd:0.7 cm(0.6 - 1.1 cm)       LA Dimension: 2.3 cm(1.9 - 4.0 cm)  Fractional Shortenin.55 %    LA volume/Index: 33.7 ml /18m^2  Calculated LVEF (%): 86.38 %     LVOT:1.7 cm   Mitral:                              Aortic   Peak E-Wave: 0.75 m/s                Peak Velocity: 1.51 m/s  Peak A-Wave: 1.15 m/s                Mean Velocity: 0.86 m/s  E/A Ratio: 0.65                      Peak Gradient: 9.12 mmHg  Peak Gradient: 2.26 mmHg             Mean Gradient: 4 mmHg  Deceleration Time: 130 msec                                        Area (continuity): 2.26 cm^2                                       AV VTI: 20.6 cm  Septal Wall E' velocity:0.07 m/s Lateral Wall E' velocity:0.12 m/s    XR ABDOMEN (KUB) (SINGLE AP VIEW)    Result Date: 2022  EXAMINATION: ONE SUPINE XRAY VIEW(S) OF THE ABDOMEN 2022 9:33 am COMPARISON: 2022, 2022 HISTORY: ORDERING SYSTEM PROVIDED HISTORY: NGT placement, dark liquid coming through NG TECHNOLOGIST PROVIDED HISTORY: NGT placement, dark liquid coming through NG Reason for Exam: NGT placement, dark liquid coming through NG FINDINGS: Enteric tube coiled in the body of the stomach. Nonobstructive bowel gas pattern. Mild stool burden.   Multiple external devices project over the patient. No acute osseous abnormality identified. Enteric tube coiled at the level of the body of the stomach. Nonobstructive bowel gas pattern. CT HEAD WO CONTRAST    Result Date: 9/8/2022  EXAMINATION: CT OF THE HEAD WITHOUT CONTRAST  9/8/2022 11:38 am TECHNIQUE: CT of the head was performed without the administration of intravenous contrast. Automated exposure control, iterative reconstruction, and/or weight based adjustment of the mA/kV was utilized to reduce the radiation dose to as low as reasonably achievable. COMPARISON: MRI 08/04/2022, 08/03/2022 and CT 08/03/2022. HISTORY: ORDERING SYSTEM PROVIDED HISTORY: Mental Status Changes TECHNOLOGIST PROVIDED HISTORY: Mental Status Changes Decision Support Exception - unselect if not a suspected or confirmed emergency medical condition->Emergency Medical Condition (MA) Is the patient pregnant?->No Reason for Exam: AMS Additional signs and symptoms: AMS FINDINGS: BRAIN/VENTRICLES: There is no acute intracranial hemorrhage, mass effect or midline shift. No abnormal extra-axial fluid collection. Encephalomalacia in the left frontal lobe demonstrated corresponding to recently demonstrated ischemia. Cortical atrophy and chronic white matter changes in the brain and associated ventricular enlargement are again demonstrated. ORBITS: The visualized portion of the orbits demonstrate no acute abnormality. SINUSES: The visualized paranasal sinuses and mastoid air cells demonstrate no acute abnormality. SOFT TISSUES/SKULL:  No acute abnormality of the visualized skull or soft tissues. Chronic findings in the brain without acute CT abnormality identified. Expected evolution of recently demonstrated ischemia in the left frontal lobe. US LIVER    Result Date: 9/10/2022  EXAMINATION: RIGHT UPPER QUADRANT ULTRASOUND 9/10/2022 11:21 am COMPARISON: None.  HISTORY: ORDERING SYSTEM PROVIDED HISTORY: elevated lft TECHNOLOGIST PROVIDED HISTORY: elevated lft FINDINGS: LIVER:  The liver demonstrates normal echogenicity without evidence of intrahepatic biliary ductal dilatation. Hepatopetal flow portal vein. Liver 18.3 cm in length. BILIARY SYSTEM:  Gallstones in the gallbladder. Negative sonographic Gilliland's sign. Mild wall thickening at 4.6 mm. No pericholecystic fluid. Common bile duct is within normal limits measuring 6.8 mm. RIGHT KIDNEY: The right kidney is grossly unremarkable without evidence of hydronephrosis. PANCREAS:  Visualized portions of the pancreas are unremarkable. OTHER: No evidence of right upper quadrant ascites. Unremarkable ultrasound the liver. Cholelithiasis with mild gallbladder wall thickening. Negative sonographic Gilliland's sign. No pericholecystic fluid. Common duct upper normal at 6.8 mm. No intraductal stone demonstrated. Correlation clinical findings and laboratory values required. XR CHEST PORTABLE    Result Date: 9/8/2022  EXAMINATION: ONE XRAY VIEW OF THE CHEST 9/8/2022 7:53 pm COMPARISON: 08/17/2022 HISTORY: ORDERING SYSTEM PROVIDED HISTORY: line placement TECHNOLOGIST PROVIDED HISTORY: line placement Reason for Exam: Line placement FINDINGS: Central venous catheter tip overlies the right atrium. No pleural effusion or pneumothorax. Heart size is at the upper limits of normal.     Right central venous catheter tip overlies the right atrium. No pneumothorax. XR CHEST PORTABLE    Result Date: 8/17/2022  EXAMINATION: ONE XRAY VIEW OF THE CHEST 8/17/2022 8:09 pm COMPARISON: None. HISTORY: ORDERING SYSTEM PROVIDED HISTORY: chest pain TECHNOLOGIST PROVIDED HISTORY: chest pain FINDINGS: Chest radiograph: The cardiomediastinal silhouette and hilar contours are normal. The lungs are clear with no focal consolidation, pleural effusion or pneumothorax. The overlying soft tissue and osseous structures do not demonstrate acute abnormality. No acute intrathoracic pathology.      US RETROPERITONEAL COMPLETE    Result Date: 9/8/2022  EXAMINATION: RETROPERITONEAL ULTRASOUND OF THE KIDNEYS AND URINARY BLADDER 9/8/2022 COMPARISON: None HISTORY: ORDERING SYSTEM PROVIDED HISTORY: Acute kidney injury TECHNOLOGIST PROVIDED HISTORY: Acute kidney injury 56-year-old female with acute kidney injury FINDINGS: Kidneys: Right kidney measures 10.4 x 4.3 x 5.0 cm. Right renal cortical thickness measures 1.7 cm. Left kidney measures 9.4 x 4.8 x 4.8 cm. Left renal cortical thickness measures 1.8 cm. No hydronephrosis or perinephric fluid. No echogenic foci with posterior acoustic shadowing to suggest renal calculi. Gross preservation of the bilateral corticomedullary differentiation. Bladder: Not imaged. Unremarkable renal ultrasound. No hydronephrosis. Physical Examination:        Constitutional:       Appearance: She is ill-appearing. HENT:      Head: Normocephalic and atraumatic. Nose: No congestion or rhinorrhea. Eyes:      Extraocular Movements: Extraocular movements intact. Conjunctiva/sclera: Conjunctivae normal.      Pupils: Pupils are equal, round, and reactive to light. Cardiovascular:      Rate and Rhythm: Tachycardia present. Pulses: Normal pulses. Heart sounds: Normal heart sounds. No murmur heard. No friction rub. No gallop. Pulmonary:      Effort: Pulmonary effort is normal. No respiratory distress. Breath sounds: Normal breath sounds. No wheezing or rales. Abdominal:      General: Abdomen is flat. Bowel sounds are normal. There is no distension. Palpations: There is no mass. Tenderness: There is no guarding. Musculoskeletal:      Right lower leg: No edema. Left lower leg: No edema. Skin:     Capillary Refill: Capillary refill takes less than 2 seconds. Neurological:      General: No focal deficit present. Mental Status: She is alert but confused and disoriented. Sensory: No sensory deficit. Motor: No weakness.    Psychiatric: was not accessed due to altered mental status      Assessment:        Primary Problem  DKA, type 1, not at goal [facility])    Active Hospital Problems    Diagnosis Date Noted    Diabetic ketoacidosis with coma associated with type 1 diabetes mellitus ([facility] [E10.11] 09/13/2022     Priority: Medium    Coffee ground emesis [K92.0] 09/11/2022     Priority: Medium    Elevated LFTs [R79.89] 09/11/2022     Priority: Medium    Unspecified mood (affective) disorder ([facility] Jazmyn Pazite 09/10/2022     Priority: Medium    Uremic encephalopathy [G93.49, N19] 09/09/2022     Priority: Medium    DKA, type 1, not at goal [facility]) [E10.10] 09/08/2022     Priority: Medium    DKA, type 2, not at goal [facility]) [E11.10] 09/08/2022     Priority: Medium    Acute GI bleeding [K92.2] 10/19/2018    Cocaine abuse ([facility] [F14.10] 01/05/2018       Plan:        DKA, 2/2 poor insulin compliance, resolving  - Glucose on admssion: 1,034; glucose today 80  - BMP q4h, initial Mag and Phos levels  - Glucose checks 4 times daily  - Home Lantus 30 units BID   - Home metformin on hold  -IV Normal Saline at 250 mL/hr  -Hypoglycemia, phos and potassium replacement protocols in place   -General surgery placed central line      Acute Kidney Injury pre-renal azotemia secondary to dehydration  -Baseline Cr 0.73;  Cr on admission: 3.56; Cr today 1.00  -Change IV NS infusion to 0.45 NS at 150 ml/hr  -BMP q4 hours  -monitor urine output  -Nephrology consulted      Suspicion for upper GI bleeding  -Coffee-ground emesis from his NG tube, eventually become bilious  -Globin had been steadily climbing since admission, currently is stable around 8  -GI on board  -PPI started  -Upper endoscopy is planned once electrolyte abnormalities resolve     Hypotension secondary to dehydration by osmotic diuresis  -BP today:113/61  -Free water deficit 10 L  -Continue NS infusion     Hypernatremia  -most recent Na+ : 147;   -Continue IV NS infusion to 0.45 NS at 150 ml/hr      Acute metabolic encephalopathy s/s to electrolyte

## 2022-09-17 NOTE — PROGRESS NOTES
Patient was agreeable to let us take her blood sugar. Patient blood sugar was 223. Writer attempted to administer insulin per orders however patient refused. Writer educated patient however patient stated \"im tired, I dont want the insulin. \"

## 2022-09-17 NOTE — PLAN OF CARE
Problem: Discharge Planning  Goal: Discharge to home or other facility with appropriate resources  9/17/2022 1533 by Denae Fraser RN  Outcome: Progressing  Flowsheets (Taken 9/17/2022 0815)  Discharge to home or other facility with appropriate resources: Refer to discharge planning if patient needs post-hospital services based on physician order or complex needs related to functional status, cognitive ability or social support system  9/17/2022 0323 by Mar De Leon RN  Outcome: Progressing  9/17/2022 0322 by Mar De Leon RN  Outcome: Progressing     Problem: Pain  Goal: Verbalizes/displays adequate comfort level or baseline comfort level  9/17/2022 1533 by Denae Fraser RN  Outcome: Progressing  9/17/2022 0323 by Mar De Leon RN  Outcome: Progressing  9/17/2022 0322 by Mar De Leon RN  Outcome: Progressing     Problem: Skin/Tissue Integrity  Goal: Absence of new skin breakdown  Description: 1. Monitor for areas of redness and/or skin breakdown  2. Assess vascular access sites hourly  3. Every 4-6 hours minimum:  Change oxygen saturation probe site  4. Every 4-6 hours:  If on nasal continuous positive airway pressure, respiratory therapy assess nares and determine need for appliance change or resting period.   9/17/2022 1533 by Denae Fraser RN  Outcome: Progressing  9/17/2022 0323 by Mar De Leon RN  Outcome: Progressing  Note: Pt absent of any new skin breakdown  9/17/2022 0322 by Mar De Leon RN  Outcome: Progressing     Problem: ABCDS Injury Assessment  Goal: Absence of physical injury  9/17/2022 1533 by Denae Fraser RN  Outcome: Progressing  Flowsheets (Taken 9/17/2022 1155)  Absence of Physical Injury: Implement safety measures based on patient assessment  9/17/2022 0323 by Mar De Leon RN  Outcome: Progressing  Note: Pt absent of any physical injury  9/17/2022 0322 by Mar De Leon RN  Outcome: Progressing     Problem: Safety - Adult  Goal: Free from fall injury  9/17/2022 1533 by Debi Saul RN  Outcome: Progressing  Flowsheets (Taken 9/17/2022 1155)  Free From Fall Injury: Instruct family/caregiver on patient safety  9/17/2022 0323 by Liya Alfonso RN  Outcome: Progressing  Note: Pt free from falls  9/17/2022 0322 by Liya Alfonso RN  Outcome: Progressing     Problem: Neurosensory - Adult  Goal: Achieves stable or improved neurological status  9/17/2022 1533 by Debi Saul RN  Outcome: Progressing  Flowsheets (Taken 9/17/2022 0815)  Achieves stable or improved neurological status: Initiate measures to prevent increased intracranial pressure  9/17/2022 0323 by Liya Alfonso RN  Outcome: Progressing  9/17/2022 0322 by Liya Alfonso RN  Outcome: Progressing  Goal: Absence of seizures  9/17/2022 1533 by Debi Saul RN  Outcome: Progressing  Flowsheets (Taken 9/17/2022 0815)  Absence of seizures: Monitor for seizure activity.   If seizure occurs, document type and location of movements and any associated apnea  9/17/2022 0323 by Liya Alfonso RN  Outcome: Progressing  9/17/2022 0322 by Liya Alfonso RN  Outcome: Progressing  Goal: Remains free of injury related to seizures activity  9/17/2022 1533 by Debi Saul RN  Outcome: Progressing  Flowsheets (Taken 9/17/2022 0815)  Remains free of injury related to seizure activity: Monitor patient for seizure activity, document and report duration and description of seizure to Licensed Independent Practitioner  9/17/2022 0323 by Liya Alfonso RN  Outcome: Progressing  9/17/2022 0322 by Liya Alfonso RN  Outcome: Progressing  Goal: Achieves maximal functionality and self care  9/17/2022 1533 by Debi Saul RN  Outcome: Progressing  Flowsheets (Taken 9/17/2022 0815)  Achieves maximal functionality and self care: Monitor swallowing and airway patency with patient fatigue and changes in neurological status  9/17/2022 0323 by Liya Alfonso RN  Outcome: Progressing  9/17/2022 0322 by Danielle Howe RN  Outcome: Progressing     Problem: Cardiovascular - Adult  Goal: Maintains optimal cardiac output and hemodynamic stability  9/17/2022 1533 by Danial Chapin RN  Outcome: Progressing  Flowsheets (Taken 9/17/2022 0815)  Maintains optimal cardiac output and hemodynamic stability: Monitor blood pressure and heart rate  9/17/2022 0323 by Danielle Howe RN  Outcome: Progressing  9/17/2022 0322 by Danielle Howe RN  Outcome: Progressing  Goal: Absence of cardiac dysrhythmias or at baseline  9/17/2022 1533 by Danial Chapin RN  Outcome: Progressing  Flowsheets (Taken 9/17/2022 0815)  Absence of cardiac dysrhythmias or at baseline: Monitor cardiac rate and rhythm  9/17/2022 0323 by Danielle Howe RN  Outcome: Progressing  9/17/2022 0322 by Danielle Howe RN  Outcome: Progressing     Problem: Skin/Tissue Integrity - Adult  Goal: Skin integrity remains intact  9/17/2022 1533 by Danial Chapin RN  Outcome: Progressing  Flowsheets (Taken 9/17/2022 0815)  Skin Integrity Remains Intact: Monitor for areas of redness and/or skin breakdown  9/17/2022 0323 by Danielle Howe RN  Outcome: Progressing  9/17/2022 0322 by Danielle Howe RN  Outcome: Progressing  Goal: Incisions, wounds, or drain sites healing without S/S of infection  9/17/2022 1533 by Danial Chapin RN  Outcome: Progressing  9/17/2022 0323 by Danielle Howe RN  Outcome: Progressing  9/17/2022 0322 by Danielle Howe RN  Outcome: Progressing  Goal: Oral mucous membranes remain intact  9/17/2022 1533 by Danial Chapin RN  Outcome: Progressing  9/17/2022 0323 by Danielle Howe RN  Outcome: Progressing  9/17/2022 0322 by Danielle Howe RN  Outcome: Progressing     Problem: Musculoskeletal - Adult  Goal: Return mobility to safest level of function  9/17/2022 1533 by Danial Chapin RN  Outcome: Progressing  Flowsheets (Taken 9/17/2022 0815)  Return Mobility to Safest Level of Function: Assess patient stability and activity tolerance for standing, transferring and ambulating with or without assistive devices  9/17/2022 0323 by Lena Salas RN  Outcome: Progressing  9/17/2022 0322 by Lena Salas RN  Outcome: Progressing  Goal: Return ADL status to a safe level of function  9/17/2022 1533 by Bandar Glover RN  Outcome: Progressing  Flowsheets (Taken 9/17/2022 0815)  Return ADL Status to a Safe Level of Function: Administer medication as ordered  9/17/2022 0323 by Lena Salas RN  Outcome: Progressing  9/17/2022 0322 by Lena Salas RN  Outcome: Progressing     Problem: Gastrointestinal - Adult  Goal: Minimal or absence of nausea and vomiting  9/17/2022 1533 by Bandar Glover RN  Outcome: Progressing  Flowsheets (Taken 9/17/2022 0815)  Minimal or absence of nausea and vomiting: Administer IV fluids as ordered to ensure adequate hydration  9/17/2022 0323 by Lena Salas RN  Outcome: Progressing  9/17/2022 0322 by Lena Salas RN  Outcome: Progressing  Goal: Maintains or returns to baseline bowel function  9/17/2022 1533 by Bandar Glover RN  Outcome: Progressing  [address] (Taken 9/17/2022 0815)  Maintains or returns to baseline bowel function: Assess bowel function  9/17/2022 0323 by Lena Salas RN  Outcome: Progressing  9/17/2022 0322 by Lena Salas RN  Outcome: Progressing  Goal: Maintains adequate nutritional intake  9/17/2022 1533 by Bandar Glover RN  Outcome: Progressing  Flowsheets (Taken 9/17/2022 0815)  Maintains adequate nutritional intake: Monitor percentage of each meal consumed  9/17/2022 0323 by Lena Salas RN  Outcome: Progressing  9/17/2022 0322 by Lena Salas RN  Outcome: Progressing     Problem: Genitourinary - Adult  Goal: Absence of urinary retention  9/17/2022 1533 by Bandar Glover RN  Outcome: Progressing  Flowsheets (Taken 9/17/2022 0815)  Absence of urinary retention: Assess patients ability to void and empty bladder  9/17/2022 0323 by Lena Salas RN  Outcome: Progressing  9/17/2022 0322 by Mar De Leon RN  Outcome: Progressing  Goal: Urinary catheter remains patent  9/17/2022 1533 by Denae Fraser RN  Outcome: Progressing  Flowsheets (Taken 9/17/2022 0815)  Urinary catheter remains patent: Assess patency of urinary catheter  9/17/2022 0323 by Mar De Leon RN  Outcome: Progressing  9/17/2022 0322 by Mar De Leon RN  Outcome: Progressing     Problem: Infection - Adult  Goal: Absence of infection at discharge  9/17/2022 1533 by Denae Fraser RN  Outcome: Progressing  9/17/2022 0323 by Mar De Leon RN  Outcome: Progressing  9/17/2022 0322 by Mar De Leon RN  Outcome: Progressing  Goal: Absence of infection during hospitalization  9/17/2022 1533 by Denae Fraser RN  Outcome: Progressing  9/17/2022 0323 by Mar De Leon RN  Outcome: Progressing  9/17/2022 0322 by Mar De Leon RN  Outcome: Progressing     Problem: Metabolic/Fluid and Electrolytes - Adult  Goal: Electrolytes maintained within normal limits  9/17/2022 1533 by Denae Fraser RN  Outcome: Progressing  9/17/2022 0323 by Mar De Leon RN  Outcome: Progressing  9/17/2022 0322 by Mar De Leon RN  Outcome: Progressing  Goal: Hemodynamic stability and optimal renal function maintained  9/17/2022 1533 by Denae Fraser RN  Outcome: Progressing  9/17/2022 0323 by Mar De Leon RN  Outcome: Progressing  9/17/2022 0322 by Mar De Leon RN  Outcome: Progressing  Goal: Glucose maintained within prescribed range  9/17/2022 1533 by Denae Fraser RN  Outcome: Progressing  9/17/2022 0323 by Mar De Leon RN  Outcome: Progressing  9/17/2022 0322 by Mar De Leon RN  Outcome: Progressing     Problem: Hematologic - Adult  Goal: Maintains hematologic stability  9/17/2022 1533 by Denae Fraser RN  Outcome: Progressing  9/17/2022 0323 by Sophronia Salome, RN  Outcome: Progressing  9/17/2022 0322 by Mar De Leon RN  Outcome: Progressing     Problem: Safety - Medical Restraint  Goal: Remains free of injury from restraints (Restraint for Interference with Medical Device)  Description: INTERVENTIONS:  1. Determine that other, less restrictive measures have been tried or would not be effective before applying the restraint  2. Evaluate the patient's condition at the time of restraint application  3. Inform patient/family regarding the reason for restraint  4.  Q2H: Monitor safety, psychosocial status, comfort, nutrition and hydration  9/17/2022 1533 by Tatiana Osorio RN  Outcome: Progressing  9/17/2022 0323 by Jeanette Reyes RN  Outcome: Progressing  9/17/2022 0322 by Jeanette Reyes RN  Outcome: Progressing     Problem: Chronic Conditions and Co-morbidities  Goal: Patient's chronic conditions and co-morbidity symptoms are monitored and maintained or improved  9/17/2022 1533 by Tatiana Osorio RN  Outcome: Progressing  9/17/2022 0323 by Jeanette Reyes RN  Outcome: Progressing  9/17/2022 0322 by Jeanette Reyes RN  Outcome: Progressing     Problem: Nutrition Deficit:  Goal: Optimize nutritional status  9/17/2022 1533 by Tatiana Osorio RN  Outcome: Progressing  9/17/2022 0323 by Jeanette Reyes RN  Outcome: Progressing  9/17/2022 0322 by Jeanette Reyes RN  Outcome: Progressing

## 2022-09-17 NOTE — PROGRESS NOTES
Writer was contacted by Heloise Cheadle, regarding decision making for patient. Patient is not able to make decisions for herself based on physician notes; therefore, Silver Cox contacted pt's daughter Virginia Montes, who is the oldest child. Writer explained that if there are other children, we need the majority of children to agree on decisions. Writer called Virginia Montes and explained the situation; there were 4 children, 2 are , and the other [place] does not want to be involved in anything regarding patient. Ananya Soliman call writer and confirm that she would not be involved in decision making of any kind for patient. She asked that her phone number not be entered in chart. Writer has the number in [facility] if it's needed.  Writer called Silver Cox and reviewed information and noted Virginia Montes does not need to complete any paperwork.    22 1157   Encounter Summary   Encounter Overview/Reason  Advance Care Planning   Service Provided For: Family   Referral/Consult From:   (LSW)   Last Encounter  22   Complexity of Encounter Moderate   Begin Time 7078  ([id number])   Encounter    Type Follow up   Ethics/Mediation   Type Other (comment)  (healthcare decision maker conversation)   Advance Care Planning   Type ACP conversation

## 2022-09-18 LAB
GLUCOSE BLD-MCNC: 209 MG/DL (ref 65–105)
GLUCOSE BLD-MCNC: 331 MG/DL (ref 65–105)
GLUCOSE BLD-MCNC: 355 MG/DL (ref 65–105)

## 2022-09-18 PROCEDURE — 6370000000 HC RX 637 (ALT 250 FOR IP): Performed by: INTERNAL MEDICINE

## 2022-09-18 PROCEDURE — 94761 N-INVAS EAR/PLS OXIMETRY MLT: CPT

## 2022-09-18 PROCEDURE — 99232 SBSQ HOSP IP/OBS MODERATE 35: CPT | Performed by: INTERNAL MEDICINE

## 2022-09-18 PROCEDURE — 82947 ASSAY GLUCOSE BLOOD QUANT: CPT

## 2022-09-18 PROCEDURE — 6370000000 HC RX 637 (ALT 250 FOR IP): Performed by: STUDENT IN AN ORGANIZED HEALTH CARE EDUCATION/TRAINING PROGRAM

## 2022-09-18 PROCEDURE — 1200000000 HC SEMI PRIVATE

## 2022-09-18 PROCEDURE — 94640 AIRWAY INHALATION TREATMENT: CPT

## 2022-09-18 RX ORDER — INSULIN GLARGINE 100 [IU]/ML
15 INJECTION, SOLUTION SUBCUTANEOUS NIGHTLY
Status: DISCONTINUED | OUTPATIENT
Start: 2022-09-18 | End: 2022-09-19

## 2022-09-18 RX ADMIN — ACETAMINOPHEN 650 MG: 325 TABLET, FILM COATED ORAL at 15:17

## 2022-09-18 RX ADMIN — Medication 2 PUFF: at 07:49

## 2022-09-18 RX ADMIN — PREGABALIN 75 MG: 75 CAPSULE ORAL at 07:38

## 2022-09-18 RX ADMIN — FOLIC ACID 1 MG: 1 TABLET ORAL at 07:39

## 2022-09-18 RX ADMIN — THIAMINE HCL TAB 100 MG 100 MG: 100 TAB at 07:39

## 2022-09-18 RX ADMIN — HALOPERIDOL 1 MG: 1 TABLET ORAL at 15:29

## 2022-09-18 RX ADMIN — VENLAFAXINE HYDROCHLORIDE 150 MG: 150 CAPSULE, EXTENDED RELEASE ORAL at 07:39

## 2022-09-18 RX ADMIN — METFORMIN HYDROCHLORIDE 1000 MG: 1000 TABLET ORAL at 07:38

## 2022-09-18 RX ADMIN — INSULIN LISPRO 1 UNITS: 100 INJECTION, SOLUTION INTRAVENOUS; SUBCUTANEOUS at 07:39

## 2022-09-18 RX ADMIN — INSULIN LISPRO 4 UNITS: 100 INJECTION, SOLUTION INTRAVENOUS; SUBCUTANEOUS at 11:38

## 2022-09-18 NOTE — PROGRESS NOTES
Patient up to bathroom and voids. Ambulates around the room with assistance and sits in chair. Becoming anxious and agitated.

## 2022-09-18 NOTE — CARE COORDINATION
SW will have to contact patients daughter Mckenzie Elam tomorrow (09/19) to discuss patients living situation. Mckenzie Elam is going to call patients residence to see if rent is paid to return home at discharge.      Electronically signed by PATRICIA Kauffman on 9/18/2022 at 8:21 AM

## 2022-09-18 NOTE — PROGRESS NOTES
Patient refusing to allow nursing staff to check blood sugar and refusing cullen care. Patient becomes extremely agitated and verbally aggressive when attempting to explain why checking her blood sugar is important.

## 2022-09-18 NOTE — PLAN OF CARE
Problem: Discharge Planning  Goal: Discharge to home or other facility with appropriate resources  Outcome: Progressing     Problem: Pain  Goal: Verbalizes/displays adequate comfort level or baseline comfort level  Outcome: Progressing     Problem: Skin/Tissue Integrity  Goal: Absence of new skin breakdown  Description: 1. Monitor for areas of redness and/or skin breakdown  2. Assess vascular access sites hourly  3. Every 4-6 hours minimum:  Change oxygen saturation probe site  4. Every 4-6 hours:  If on nasal continuous positive airway pressure, respiratory therapy assess nares and determine need for appliance change or resting period.   Outcome: Progressing     Problem: ABCDS Injury Assessment  Goal: Absence of physical injury  Outcome: Progressing     Problem: Safety - Adult  Goal: Free from fall injury  Outcome: Progressing     Problem: Neurosensory - Adult  Goal: Achieves stable or improved neurological status  Outcome: Progressing     Problem: Neurosensory - Adult  Goal: Absence of seizures  Outcome: Progressing     Problem: Neurosensory - Adult  Goal: Remains free of injury related to seizures activity  Outcome: Progressing     Problem: Neurosensory - Adult  Goal: Achieves maximal functionality and self care  Outcome: Progressing     Problem: Cardiovascular - Adult  Goal: Maintains optimal cardiac output and hemodynamic stability  Outcome: Progressing     Problem: Cardiovascular - Adult  Goal: Absence of cardiac dysrhythmias or at baseline  Outcome: Progressing     Problem: Skin/Tissue Integrity - Adult  Goal: Skin integrity remains intact  Outcome: Progressing     Problem: Skin/Tissue Integrity - Adult  Goal: Incisions, wounds, or drain sites healing without S/S of infection  Outcome: Progressing     Problem: Skin/Tissue Integrity - Adult  Goal: Oral mucous membranes remain intact  Outcome: Progressing     Problem: Musculoskeletal - Adult  Goal: Return mobility to safest level of function  Outcome: Progressing     Problem: Musculoskeletal - Adult  Goal: Return ADL status to a safe level of function  Outcome: Progressing     Problem: Gastrointestinal - Adult  Goal: Minimal or absence of nausea and vomiting  Outcome: Progressing     Problem: Gastrointestinal - Adult  Goal: Maintains or returns to baseline bowel function  Outcome: Progressing     Problem: Gastrointestinal - Adult  Goal: Maintains adequate nutritional intake  Outcome: Progressing     Problem: Genitourinary - Adult  Goal: Absence of urinary retention  Outcome: Progressing     Problem: Genitourinary - Adult  Goal: Urinary catheter remains patent  Outcome: Progressing     Problem: Metabolic/Fluid and Electrolytes - Adult  Goal: Electrolytes maintained within normal limits  Outcome: Progressing     Problem: Metabolic/Fluid and Electrolytes - Adult  Goal: Hemodynamic stability and optimal renal function maintained  Outcome: Progressing     Problem: Metabolic/Fluid and Electrolytes - Adult  Goal: Glucose maintained within prescribed range  Outcome: Progressing     Problem: Hematologic - Adult  Goal: Maintains hematologic stability  Outcome: Progressing

## 2022-09-18 NOTE — PROGRESS NOTES
Patient continues to refuse all care including vital signs, blood sugar checks, labs, IV fluids, telemetry, and EPC's.

## 2022-09-19 LAB
GLUCOSE BLD-MCNC: 175 MG/DL (ref 65–105)
GLUCOSE BLD-MCNC: 248 MG/DL (ref 65–105)
GLUCOSE BLD-MCNC: 313 MG/DL (ref 65–105)

## 2022-09-19 PROCEDURE — 6370000000 HC RX 637 (ALT 250 FOR IP): Performed by: INTERNAL MEDICINE

## 2022-09-19 PROCEDURE — 1200000000 HC SEMI PRIVATE

## 2022-09-19 PROCEDURE — 99232 SBSQ HOSP IP/OBS MODERATE 35: CPT | Performed by: PSYCHIATRY & NEUROLOGY

## 2022-09-19 PROCEDURE — 99232 SBSQ HOSP IP/OBS MODERATE 35: CPT | Performed by: INTERNAL MEDICINE

## 2022-09-19 PROCEDURE — 6370000000 HC RX 637 (ALT 250 FOR IP): Performed by: STUDENT IN AN ORGANIZED HEALTH CARE EDUCATION/TRAINING PROGRAM

## 2022-09-19 PROCEDURE — 82947 ASSAY GLUCOSE BLOOD QUANT: CPT

## 2022-09-19 RX ORDER — OLANZAPINE 10 MG/1
10 TABLET, ORALLY DISINTEGRATING ORAL NIGHTLY
Status: DISCONTINUED | OUTPATIENT
Start: 2022-09-19 | End: 2022-09-21

## 2022-09-19 RX ORDER — INSULIN GLARGINE 100 [IU]/ML
15 INJECTION, SOLUTION SUBCUTANEOUS 2 TIMES DAILY
Status: DISCONTINUED | OUTPATIENT
Start: 2022-09-19 | End: 2022-09-22 | Stop reason: HOSPADM

## 2022-09-19 RX ADMIN — METFORMIN HYDROCHLORIDE 1000 MG: 1000 TABLET ORAL at 10:07

## 2022-09-19 RX ADMIN — TRAZODONE HYDROCHLORIDE 150 MG: 100 TABLET ORAL at 00:20

## 2022-09-19 RX ADMIN — METFORMIN HYDROCHLORIDE 1000 MG: 1000 TABLET ORAL at 17:16

## 2022-09-19 RX ADMIN — ATORVASTATIN CALCIUM 40 MG: 40 TABLET, FILM COATED ORAL at 21:29

## 2022-09-19 RX ADMIN — PREGABALIN 75 MG: 75 CAPSULE ORAL at 00:20

## 2022-09-19 RX ADMIN — OLANZAPINE 7.5 MG: 15 TABLET, ORALLY DISINTEGRATING ORAL at 00:21

## 2022-09-19 RX ADMIN — VENLAFAXINE HYDROCHLORIDE 150 MG: 150 CAPSULE, EXTENDED RELEASE ORAL at 10:07

## 2022-09-19 RX ADMIN — MIRTAZAPINE 7.5 MG: 15 TABLET, FILM COATED ORAL at 21:29

## 2022-09-19 RX ADMIN — PREGABALIN 75 MG: 75 CAPSULE ORAL at 10:08

## 2022-09-19 RX ADMIN — FOLIC ACID 1 MG: 1 TABLET ORAL at 09:56

## 2022-09-19 RX ADMIN — INSULIN GLARGINE 15 UNITS: 100 INJECTION, SOLUTION SUBCUTANEOUS at 10:27

## 2022-09-19 RX ADMIN — MIRTAZAPINE 7.5 MG: 15 TABLET, FILM COATED ORAL at 00:21

## 2022-09-19 RX ADMIN — THIAMINE HCL TAB 100 MG 100 MG: 100 TAB at 10:08

## 2022-09-19 RX ADMIN — TRAZODONE HYDROCHLORIDE 150 MG: 100 TABLET ORAL at 21:29

## 2022-09-19 RX ADMIN — ATORVASTATIN CALCIUM 40 MG: 40 TABLET, FILM COATED ORAL at 00:22

## 2022-09-19 RX ADMIN — PREGABALIN 75 MG: 75 CAPSULE ORAL at 21:29

## 2022-09-19 RX ADMIN — INSULIN LISPRO 3 UNITS: 100 INJECTION, SOLUTION INTRAVENOUS; SUBCUTANEOUS at 10:28

## 2022-09-19 RX ADMIN — INSULIN LISPRO 1 UNITS: 100 INJECTION, SOLUTION INTRAVENOUS; SUBCUTANEOUS at 17:17

## 2022-09-19 RX ADMIN — INSULIN GLARGINE 15 UNITS: 100 INJECTION, SOLUTION SUBCUTANEOUS at 21:28

## 2022-09-19 NOTE — PROGRESS NOTES
Comprehensive Nutrition Assessment    Type and Reason for Visit:  Reassess    Nutrition Recommendations/Plan:   Will decrease to 4 carbohydrate choices per tray and monitor Glu     Malnutrition Assessment:  Malnutrition Status:  Insufficient data (09/15/22 8487)    Context:  Acute Illness     Findings of the 6 clinical characteristics of malnutrition:  Energy Intake:  75% or less of estimated energy requirements for 7 or more days  Weight Loss:  Unable to assess     Body Fat Loss:  Unable to assess     Muscle Mass Loss:  Unable to assess    Fluid Accumulation:  No significant fluid accumulation     Strength:  Not Performed    Nutrition Assessment:    Pt is meeting nutritional needs with po intake greater than 75%. POC Glu remains elevated (313). Plan is for discharge to ECF. Nutrition Related Findings:    Edema: +1 non-pitting BLE's. Hx: bipolar, substance abuse, IDDM-non-compliant. Labs & meds reviewed. Wound Type: Multiple, Wound Consult Pending, Diabetic Ulcer       Current Nutrition Intake & Therapies:    Average Meal Intake: %     ADULT DIET; Regular; 5 carb choices (75 gm/meal)    Anthropometric Measures:  Height: 5' 5\" (165.1 cm)  Ideal Body Weight (IBW): 125 lbs (57 kg)    Admission Body Weight: 205 lb (93 kg)  Current Body Weight: 207 lb (93.9 kg), 164 % IBW. Weight Source: Bed Scale  Current BMI (kg/m2): 34.4                          BMI Categories: Obese Class 1 (BMI 30.0-34. 9)    Estimated Daily Nutrient Needs:  Energy Requirements Based On: Formula  Weight Used for Energy Requirements: Admission  Energy (kcal/day): 1836 kcals based on [place]-St. Jeor using 1.2 activity factor  Weight Used for Protein Requirements: Ideal  Protein (g/day): 74-85 gm based on 1.3-1.5 gm       Nutrition Diagnosis:   Inadequate oral intake related to altered GI function as evidenced by NPO or clear liquid status due to medical condition    Nutrition Interventions:   Food and/or Nutrient Delivery: Continue

## 2022-09-19 NOTE — CARE COORDINATION
Phone call to patient's daughter Will German [phone] to follow up on if patient can return to her apartment as previously discussed in PATRICIA Busby's note. Ananya apologized and stated that she has not yet called. She stated that she would call soon, and she would call SW back. SW provided her with SW's direct phone number. Electronically signed by Gabe Melissa on 9/19/22 at 12:06 PM EDT     Addendum: SW received voicemail from Patient's daughter, Will German. Will German stated that she attempted to call where her mother is residing and the phones appeared to not be working. She stated she would try again after she gets off work and will let SW know.      Electronically signed by Gabe Melissa on 9/19/22 at 1:27 PM EDT

## 2022-09-19 NOTE — PROGRESS NOTES
Tahmina Mo CNP notified the patient is refusing all medications tonight including her insulin, her blood sugar was 331. She continues to refuse vital signs, IV, tele, and assessments. She becomes hostile and agitated when trying to encourage her to allow medical care.

## 2022-09-19 NOTE — PROGRESS NOTES
[address]    PROGRESS NOTE             9/18/2022    8:52 PM    Name:   Lashon Park  MRN:     [id number]     Acct:      [de-identified]   Room:   2047/2047-01  IP Day:  8  Admit Date:  9/8/2022 11:01 AM    PCP:  Margarito Mary MD  Code Status:  Full Code    Subjective:     C/C:   Chief Complaint   Patient presents with    Hyperglycemia    Altered Mental Status     Interval History Status: improved. Patient was seen and evaluated at bedside. S/p EGD   Refusing peripheral line   CL dced       Brief History:     The patient is a 56 y.o. Non- / non  female with a history of asthma, bipolar 1 disorder, hypertension, stroke, polysubstance abuse, DM 2, degenerative disc disease, and depression with psychotic features, who presented to the ED with altered mental status. The patient was found unresponsive at her home where she lives alone. The patient had not been taking her insulin as her refrigerator was filled with unused insulin vials. The patients family had not heard from the patient for several days and EMS was called. The patient was found confused and obtunded, hypotensive, and hyperglycemic. The ED, the patient was hypotensive (BP 86/64) and tachypneic (RR 24). Blood glucose was 1,034. Serum potassium was 5.5, bicarbonate was 19 mmol/L [anion gap 25 mmol/L). Cr was 3.56 (baseline Cr 0.73). Levophed was administered. The patient was admitted for management of DKA and DKA protocol was initiated    Review of Systems:     Reason unable to perform ROS: Pt is uncooperative      Medications: Allergies:     Allergies   Allergen Reactions    Bactrim [Sulfamethoxazole-Trimethoprim] Swelling    Adhesive Tape Rash       Current Meds:   Scheduled Meds:    insulin glargine  15 Units SubCUTAneous Nightly    pregabalin  75 mg Oral BID    OLANZapine zydis  7.5 mg Oral Nightly    thiamine mononitrate  100 mg Oral Daily folic acid  1 mg Oral Daily    pantoprazole (PROTONIX) 40 mg injection  40 mg IntraVENous BID    insulin lispro  0-4 Units SubCUTAneous TID     insulin lispro  0-4 Units SubCUTAneous Nightly    atorvastatin  40 mg Oral Nightly    budesonide-formoterol  2 puff Inhalation BID    metFORMIN  1,000 mg Oral BID     mirtazapine  7.5 mg Oral Nightly    traZODone  150 mg Oral Nightly    venlafaxine  150 mg Oral Daily with breakfast    fluticasone  1 spray Each Nostril Daily     Continuous Infusions:    sodium chloride      sodium chloride Stopped (09/16/22 1833)    dextrose      dextrose       PRN Meds: sodium chloride, haloperidol, potassium chloride, magnesium sulfate, acetaminophen, glucose, dextrose bolus **OR** dextrose bolus, dextrose, glucagon (rDNA), dextrose, albuterol sulfate HFA, sodium phosphate IVPB **OR** sodium phosphate IVPB **OR** sodium phosphate IVPB, polyethylene glycol    Data:     Past Medical History:   has a past medical history of Acid reflux, Acute cystitis with hematuria, Acute non-recurrent maxillary sinusitis, Asthma, Bipolar 1 disorder ([facility] [address] [address] Bipolar disorder, mixed ([address] BMI 34.0-34.9,adult, Cannabis use disorder, severe, dependence ([address] Cerebrovascular accident (CVA) ([address] Chest pain, Chronic renal insufficiency, Cocaine abuse ([address] COVID-19 virus RNA test result unknown, DDD (degenerative disc disease), cervical, Diabetes mellitus ([address] Dizziness, Fibromyalgia, History of stroke, Homicidal ideation, Hyperglycemia, Hypertension, Hypotension, IDDM (insulin dependent diabetes mellitus), Lupus ([address] Migraine, Neuropathy, Neuropathy, Polysubstance abuse ([address] Post traumatic stress disorder (PTSD), Posttraumatic stress disorder, Recurrent depression ([address] Recurrent major depressive disorder, in partial remission ([address] Screening mammogram, encounter for, Severe recurrent major depression with psychotic features ([address] Severe recurrent major depression without psychotic features [facility]), Stroke (cerebrum) ([facility] [address] [address] Stroke ([facility] [address] Suicidal ideation, Suicidal intent, Vitamin D deficiency, and White matter changes. Social History:   reports that she has never smoked. She has never used smokeless tobacco. She reports that she does not currently use alcohol. She reports current drug use. Drugs: Marijuana (Weed) and Cocaine. Family History:   Family History   Problem Relation Age of Onset    Diabetes Mother     Stroke Mother     Diabetes Father     Diabetes Sister     Diabetes Brother     Other Son         GSW    No Known Problems Sister        Vitals:  /62   Pulse 92   Temp 98.4 °F (36.9 °C)   Resp 20   Ht 5' 5\" (1.651 m)   Wt 207 lb 10.8 oz (94.2 kg)   LMP  (LMP Unknown)   SpO2 98%   PF (!) 15 L/min   BMI 34.56 kg/m²   Temp (24hrs), Av.4 °F (36.9 °C), Min:98.4 °F (36.9 °C), Max:98.4 °F (36.9 °C)    Recent Labs     22  1616 22  0707 22  1131 22   POCGLU 372* 209* 355* 331*         I/O(24Hr):   No intake or output data in the 24 hours ending 22      Labs:    [unfilled]    Lab Results   Component Value Date/Time    SPECIAL NOT REPORTED 2022 12:47 PM     Lab Results   Component Value Date/Time    CULTURE NO GROWTH 5 DAYS 2022 12:18 PM       [unfilled]    Radiology:    ECHO Complete 2D W Doppler W Color    Result Date: 9/10/2022  [facility] Transthoracic Echocardiography Report (TTE)  Patient Name José Reagan    Date of Study               2022               [facility]   Date of      1965  Gender                      Female  Birth   Age          56 year(s)  Race                        Black   Room Number          Height:                     65 inch, 165.1 cm   Corporate ID [id number]    Weight:                     180 pounds, 81.6 kg  #   Patient Acct [de-identified]   BSA:          1.89 m^2      BMI:      29.95  #                                                              kg/m^2   MR #         [id number] Tejas Perez   Accession #  [id number]  Interpreting Physician      Mckinley Bolivar [address]   Fellow                   Referring Nurse                           Practitioner   Interpreting             Referring Physician  Fellow  Type of Study   TTE procedure:2D Echocardiogram, M-Mode, Doppler, Color Doppler. Procedure Date Date: 09/09/2022 Start: 12:50 PM Study Location: [address] [place] [address] Technical Quality: Fair visualization Indications:Tachycardia. Patient Status: Inpatient Height: 65 inches Weight: 180 pounds BSA: 1.89 m^2 BMI: 29.95 kg/m^2 Rhythm: Sinus tachycardia HR: 108 bpm BP: 98/55 mmHg CONCLUSIONS Summary Left ventricle is normal in size and wall thickness. Global left ventricular systolic function is normal. Estimated LV EF 60-65 %. No obvious wall motion abnormality seen. Left atrium is normal in size. No significant valvular regurgitation or stenosis seen. Signature ----------------------------------------------------------------------------  Electronically signed by Carina Newsome(Sonographer) on 09/09/2022 02:20  PM ---------------------------------------------------------------------------- ----------------------------------------------------------------------------  Electronically signed by Suleiman Ponce(Interpreting physician) on 09/10/2022  08:12 AM ---------------------------------------------------------------------------- FINDINGS Left Atrium Left atrium is normal in size. Left Ventricle Left ventricle is normal in size and wall thickness. Global left ventricular systolic function is normal. Estimated LV EF 60-65 %. No obvious wall motion abnormality seen. Right Atrium Right atrium is normal in size. Right Ventricle Normal right ventricular size and function. Mitral Valve No obvious valvular abnormality seen. No evidence of mitral regurgitation. Aortic Valve No obvious valvular abnormality seen. No evidence of aortic insufficiency or stenosis.  Tricuspid Valve Tricuspid valve was not well visualized. Insignificant tricuspid regurgitation, unable to estimate RVSP. Pulmonic Valve Pulmonic valve was not well visualized. No evidence of pulmonic insufficiency or stenosis. Pericardial Effusion No significant pericardial effusion is seen. Pleural Effusion No pleural effusion seen. Miscellaneous Normal aortic root dimension. IVC not well visualized. E/E' average = 8.5. M-mode / 2D Measurements & Calculations:   LVIDd:3.79 cm(3.7 - 5.6 cm)      Diastolic XCUMDI:11.0 ml  RPRDA:1.03 cm(2.2 - 4.0 cm)      Systolic KBSUVS:3.03 ml  PRSS:9.20 cm(0.6 - 1.1 cm)       Aortic Root:3.6 cm(2.0 - 3.7 cm)  LVPWd:0.7 cm(0.6 - 1.1 cm)       LA Dimension: 2.3 cm(1.9 - 4.0 cm)  Fractional Shortenin.55 %    LA volume/Index: 33.7 ml /18m^2  Calculated LVEF (%): 86.38 %     LVOT:1.7 cm   Mitral:                              Aortic   Peak E-Wave: 0.75 m/s                Peak Velocity: 1.51 m/s  Peak A-Wave: 1.15 m/s                Mean Velocity: 0.86 m/s  E/A Ratio: 0.65                      Peak Gradient: 9.12 mmHg  Peak Gradient: 2.26 mmHg             Mean Gradient: 4 mmHg  Deceleration Time: 130 msec                                        Area (continuity): 2.26 cm^2                                       AV VTI: 20.6 cm  Septal Wall E' velocity:0.07 m/s Lateral Wall E' velocity:0.12 m/s    XR ABDOMEN (KUB) (SINGLE AP VIEW)    Result Date: 2022  EXAMINATION: ONE SUPINE XRAY VIEW(S) OF THE ABDOMEN 2022 9:33 am COMPARISON: 2022, 2022 HISTORY: ORDERING SYSTEM PROVIDED HISTORY: NGT placement, dark liquid coming through NG TECHNOLOGIST PROVIDED HISTORY: NGT placement, dark liquid coming through NG Reason for Exam: NGT placement, dark liquid coming through NG FINDINGS: Enteric tube coiled in the body of the stomach. Nonobstructive bowel gas pattern. Mild stool burden. Multiple external devices project over the patient. No acute osseous abnormality identified.      Enteric tube coiled at the level of the body of the stomach. Nonobstructive bowel gas pattern. CT HEAD WO CONTRAST    Result Date: 9/8/2022  EXAMINATION: CT OF THE HEAD WITHOUT CONTRAST  9/8/2022 11:38 am TECHNIQUE: CT of the head was performed without the administration of intravenous contrast. Automated exposure control, iterative reconstruction, and/or weight based adjustment of the mA/kV was utilized to reduce the radiation dose to as low as reasonably achievable. COMPARISON: MRI 08/04/2022, 08/03/2022 and CT 08/03/2022. HISTORY: ORDERING SYSTEM PROVIDED HISTORY: Mental Status Changes TECHNOLOGIST PROVIDED HISTORY: Mental Status Changes Decision Support Exception - unselect if not a suspected or confirmed emergency medical condition->Emergency Medical Condition (MA) Is the patient pregnant?->No Reason for Exam: AMS Additional signs and symptoms: AMS FINDINGS: BRAIN/VENTRICLES: There is no acute intracranial hemorrhage, mass effect or midline shift. No abnormal extra-axial fluid collection. Encephalomalacia in the left frontal lobe demonstrated corresponding to recently demonstrated ischemia. Cortical atrophy and chronic white matter changes in the brain and associated ventricular enlargement are again demonstrated. ORBITS: The visualized portion of the orbits demonstrate no acute abnormality. SINUSES: The visualized paranasal sinuses and mastoid air cells demonstrate no acute abnormality. SOFT TISSUES/SKULL:  No acute abnormality of the visualized skull or soft tissues. Chronic findings in the brain without acute CT abnormality identified. Expected evolution of recently demonstrated ischemia in the left frontal lobe. US LIVER    Result Date: 9/10/2022  EXAMINATION: RIGHT UPPER QUADRANT ULTRASOUND 9/10/2022 11:21 am COMPARISON: None.  HISTORY: ORDERING SYSTEM PROVIDED HISTORY: elevated lft TECHNOLOGIST PROVIDED HISTORY: elevated lft FINDINGS: LIVER:  The liver demonstrates normal echogenicity without evidence of intrahepatic biliary ductal dilatation. Hepatopetal flow portal vein. Liver 18.3 cm in length. BILIARY SYSTEM:  Gallstones in the gallbladder. Negative sonographic Gilliland's sign. Mild wall thickening at 4.6 mm. No pericholecystic fluid. Common bile duct is within normal limits measuring 6.8 mm. RIGHT KIDNEY: The right kidney is grossly unremarkable without evidence of hydronephrosis. PANCREAS:  Visualized portions of the pancreas are unremarkable. OTHER: No evidence of right upper quadrant ascites. Unremarkable ultrasound the liver. Cholelithiasis with mild gallbladder wall thickening. Negative sonographic Gilliland's sign. No pericholecystic fluid. Common duct upper normal at 6.8 mm. No intraductal stone demonstrated. Correlation clinical findings and laboratory values required. XR CHEST PORTABLE    Result Date: 9/8/2022  EXAMINATION: ONE XRAY VIEW OF THE CHEST 9/8/2022 7:53 pm COMPARISON: 08/17/2022 HISTORY: ORDERING SYSTEM PROVIDED HISTORY: line placement TECHNOLOGIST PROVIDED HISTORY: line placement Reason for Exam: Line placement FINDINGS: Central venous catheter tip overlies the right atrium. No pleural effusion or pneumothorax. Heart size is at the upper limits of normal.     Right central venous catheter tip overlies the right atrium. No pneumothorax. XR CHEST PORTABLE    Result Date: 8/17/2022  EXAMINATION: ONE XRAY VIEW OF THE CHEST 8/17/2022 8:09 pm COMPARISON: None. HISTORY: ORDERING SYSTEM PROVIDED HISTORY: chest pain TECHNOLOGIST PROVIDED HISTORY: chest pain FINDINGS: Chest radiograph: The cardiomediastinal silhouette and hilar contours are normal. The lungs are clear with no focal consolidation, pleural effusion or pneumothorax. The overlying soft tissue and osseous structures do not demonstrate acute abnormality. No acute intrathoracic pathology.      US RETROPERITONEAL COMPLETE    Result Date: 9/8/2022  EXAMINATION: RETROPERITONEAL ULTRASOUND OF THE KIDNEYS AND URINARY BLADDER 9/8/2022 COMPARISON: None HISTORY: ORDERING SYSTEM PROVIDED HISTORY: Acute kidney injury TECHNOLOGIST PROVIDED HISTORY: Acute kidney injury 61-year-old female with acute kidney injury FINDINGS: Kidneys: Right kidney measures 10.4 x 4.3 x 5.0 cm. Right renal cortical thickness measures 1.7 cm. Left kidney measures 9.4 x 4.8 x 4.8 cm. Left renal cortical thickness measures 1.8 cm. No hydronephrosis or perinephric fluid. No echogenic foci with posterior acoustic shadowing to suggest renal calculi. Gross preservation of the bilateral corticomedullary differentiation. Bladder: Not imaged. Unremarkable renal ultrasound. No hydronephrosis. Physical Examination:        Constitutional:       Appearance: She is ill-appearing. HENT:      Head: Normocephalic and atraumatic. Nose: No congestion or rhinorrhea. Eyes:      Extraocular Movements: Extraocular movements intact. Conjunctiva/sclera: Conjunctivae normal.      Pupils: Pupils are equal, round, and reactive to light. Cardiovascular:      Rate and Rhythm: Tachycardia present. Pulses: Normal pulses. Heart sounds: Normal heart sounds. No murmur heard. No friction rub. No gallop. Pulmonary:      Effort: Pulmonary effort is normal. No respiratory distress. Breath sounds: Normal breath sounds. No wheezing or rales. Abdominal:      General: Abdomen is flat. Bowel sounds are normal. There is no distension. Palpations: There is no mass. Tenderness: There is no guarding. Musculoskeletal:      Right lower leg: No edema. Left lower leg: No edema. Skin:     Capillary Refill: Capillary refill takes less than 2 seconds. Neurological:      General: No focal deficit present. Mental Status: She is alert but confused and disoriented. Sensory: No sensory deficit. Motor: No weakness.    Psychiatric: was not accessed due to altered mental status      Assessment:        Primary Problem  DKA, type 1, not at goal [facility])    Active Hospital Problems    Diagnosis Date Noted    Diabetic ketoacidosis with coma associated with type 1 diabetes mellitus ([facility] [E10.11] 09/13/2022     Priority: Medium    Coffee ground emesis [K92.0] 09/11/2022     Priority: Medium    Elevated LFTs [R79.89] 09/11/2022     Priority: Medium    Unspecified mood (affective) disorder ([facility] Juliet Colon 09/10/2022     Priority: Medium    Uremic encephalopathy [G93.49, N19] 09/09/2022     Priority: Medium    DKA, type 1, not at goal [facility]) [E10.10] 09/08/2022     Priority: Medium    DKA, type 2, not at goal [facility]) [E11.10] 09/08/2022     Priority: Medium    Acute GI bleeding [K92.2] 10/19/2018    Cocaine abuse ([facility] [F14.10] 01/05/2018       Plan:        DKA, 2/2 poor insulin compliance, resolving  - Glucose on admssion: 1,034; glucose today 80  - BMP q4h, initial Mag and Phos levels  - Glucose checks 4 times daily  - Home Lantus 30 units BID   - Home metformin on hold  -IV Normal Saline at 250 mL/hr  -Hypoglycemia, phos and potassium replacement protocols in place   -General surgery placed central line      Acute Kidney Injury pre-renal azotemia secondary to dehydration  -Baseline Cr 0.73;  Cr on admission: 3.56; Cr today 1.00  -Change IV NS infusion to 0.45 NS at 150 ml/hr  -BMP q4 hours  -monitor urine output  -Nephrology consulted      Suspicion for upper GI bleeding  -Coffee-ground emesis from his NG tube, eventually become bilious  -Globin had been steadily climbing since admission, currently is stable around 8  -GI on board  -PPI started  -Upper endoscopy is planned once electrolyte abnormalities resolve     Hypotension secondary to dehydration by osmotic diuresis  -BP today:113/61  -Free water deficit 10 L  -Continue NS infusion     Hypernatremia  -most recent Na+ : 147;   -Continue IV NS infusion to 0.45 NS at 150 ml/hr      Acute metabolic encephalopathy s/s to electrolyte abnormality  -Restraints in place; patient oriented to person only  -Nephrology on board; electrolyte replacement  -EEG ordered    -Restraints placed     Polysubstance abuse  -Urine tox positive for Fentanyl and cocaine  -Psychiatry consulted      Ventricular tachycardia  -Non-sustained VT secondary to electrolye imbalance, metabolic derangement and cocaine  -Continue to manage hyperglycemia and electrolytes  -Discontinue metoprolol  -Echo: Estimated LV EF 60-65        Depression with psychotic features  -Continue Haloperidol lactate 5 mg IM q 6 hours PRN  -Continue Mirtazapine 7.5 mg po nightly  -Continue Trazodone 150 mg po nightly  -Continue Venlafaxine 150 mg po daily  -Psychiatry consulted     Asthma  -Continue home Proventil ventolin 2 puff q 4 hours as needed   -Continue home Symbicort 2 puffs BID  -Continue home Flonase 1 spray each nostril daily      Code: Full code  Diet: Adult diet, 3 carbs  DVT prophylaxis: on hold        Sept 18,  Feeling much better  GI seen her, EGD done and showed duodenal ulcers, on Protonix, refused this morning  Discharge plan to [facility] not a candidate for [facility] per psychiatrist  Acute on chronic anemia with some coffee-ground vomit but no significant drop  Hb 7.6  IV Venofer 1 dose  400 mg  Psych seen the patient and deemed patient has not noncompete   Recheck labs CBC, BMP,           Haily Graham MD  9/18/2022  8:52 PM

## 2022-09-19 NOTE — PROGRESS NOTES
09/19/22 4582   Encounter Summary   Encounter Overview/Reason  Spiritual/Emotional Needs   Service Provided For: Patient   Complexity of Encounter Low   Spiritual/Emotional needs   Type Spiritual Support   Assessment/Intervention/Outcome   Assessment Unable to assess  (sleeping)   Intervention Prayer (assurance of)/[place]

## 2022-09-19 NOTE — PLAN OF CARE
Problem: Pain  Goal: Verbalizes/displays adequate comfort level or baseline comfort level  Outcome: Progressing, Pt educated on non-pharmacological pain interventions. PRN pain medications administered. Problem: Skin/Tissue Integrity  Goal: Absence of new skin breakdown  Description: 1. Monitor for areas of redness and/or skin breakdown  2. Assess vascular access sites hourly  3. Every 4-6 hours minimum:  Change oxygen saturation probe site  4. Every 4-6 hours:  If on nasal continuous positive airway pressure, respiratory therapy assess nares and determine need for appliance change or resting period. Outcome: Progressing, Pt remain free of skin breakdown. Educated on the importance of turning and alternating position. Problem: Safety - Adult  Goal: Free from fall injury  Outcome: Progressing, Patient remains free of incidence/ injury. Bed remains in low position. Side rails up x2. Problem: Nutrition Deficit:  Goal: Optimize nutritional status  Outcome: Progressing, Pt encouraged to eat and educated about the importance of maintaining diet.

## 2022-09-19 NOTE — ANESTHESIA POSTPROCEDURE EVALUATION
Department of Anesthesiology  Postprocedure Note    Patient: Jose Juan Arndt  MRN: [id number]  YOB: 1965  Date of evaluation: 9/19/2022      Procedure Summary     Date: 09/16/22 Room / Location: [place] [address] 01 / [address] [facility]    Anesthesia Start: 1387 Anesthesia Stop: 2070    Procedure: EGD BIOPSY (Esophagus) Diagnosis:       Anemia, unspecified type      (ANEMIA)      (IP RM 2112)    Surgeons: Rod Perez MD Responsible Provider: Jewel Koo MD    Anesthesia Type: general ASA Status: 3          Anesthesia Type: No value filed. Elin Phase I: Elin Score: 10    Elin Phase II:        Anesthesia Post Evaluation    Comments: POD #3 Patient on some kind of watch for occasional violent behavior, so not seen directly. Per RN, patient had no anesthesia related issues.

## 2022-09-19 NOTE — PROGRESS NOTES
[address] [facility] [address]    PROGRESS NOTE             9/19/2022    9:23 AM    Name:   Price Hwang  MRN:     [id number]     Acct:      [de-identified]   Room:   2047/2047-01   Day:  6  Admit Date:  9/8/2022 11:01 AM    PCP:  Bebo Terrazas MD  Code Status:  Full Code    Subjective:     C/C:   Chief Complaint   Patient presents with    Hyperglycemia    Altered Mental Status     Interval History Status: improved. Patient was seen and evaluated at bedside. S/p EGD   Refusing peripheral line   CL dced       Brief History:     The patient is a 56 y.o. Non- / non  female with a history of asthma, bipolar 1 disorder, hypertension, stroke, polysubstance abuse, DM 2, degenerative disc disease, and depression with psychotic features, who presented to the ED with altered mental status. The patient was found unresponsive at her home where she lives alone. The patient had not been taking her insulin as her refrigerator was filled with unused insulin vials. The patients family had not heard from the patient for several days and EMS was called. The patient was found confused and obtunded, hypotensive, and hyperglycemic. The ED, the patient was hypotensive (BP 86/64) and tachypneic (RR 24). Blood glucose was 1,034. Serum potassium was 5.5, bicarbonate was 19 mmol/L [anion gap 25 mmol/L). Cr was 3.56 (baseline Cr 0.73). Levophed was administered. The patient was admitted for management of DKA and DKA protocol was initiated    Review of Systems:     Reason unable to perform ROS: Pt is uncooperative      Medications: Allergies:     Allergies   Allergen Reactions    Bactrim [Sulfamethoxazole-Trimethoprim] Swelling    Adhesive Tape Rash       Current Meds:   Scheduled Meds:    insulin glargine  15 Units SubCUTAneous BID    pregabalin  75 mg Oral BID    OLANZapine zydis  7.5 mg Oral Nightly    thiamine mononitrate  100 mg Oral Daily folic acid  1 mg Oral Daily    pantoprazole (PROTONIX) 40 mg injection  40 mg IntraVENous BID    insulin lispro  0-4 Units SubCUTAneous TID     insulin lispro  0-4 Units SubCUTAneous Nightly    atorvastatin  40 mg Oral Nightly    budesonide-formoterol  2 puff Inhalation BID    metFORMIN  1,000 mg Oral BID     mirtazapine  7.5 mg Oral Nightly    traZODone  150 mg Oral Nightly    venlafaxine  150 mg Oral Daily with breakfast    fluticasone  1 spray Each Nostril Daily     Continuous Infusions:    sodium chloride      sodium chloride Stopped (09/16/22 1833)    dextrose      dextrose       PRN Meds: sodium chloride, haloperidol, potassium chloride, magnesium sulfate, acetaminophen, glucose, dextrose bolus **OR** dextrose bolus, dextrose, glucagon (rDNA), dextrose, albuterol sulfate HFA, sodium phosphate IVPB **OR** sodium phosphate IVPB **OR** sodium phosphate IVPB, polyethylene glycol    Data:     Past Medical History:   has a past medical history of Acid reflux, Acute cystitis with hematuria, Acute non-recurrent maxillary sinusitis, Asthma, Bipolar 1 disorder ([address] Bipolar disorder, mixed ([address] BMI 34.0-34.9,adult, Cannabis use disorder, severe, dependence ([address] Cerebrovascular accident (CVA) ([address] Chest pain, Chronic renal insufficiency, Cocaine abuse ([address] COVID-19 virus RNA test result unknown, DDD (degenerative disc disease), cervical, Diabetes mellitus ([address] Dizziness, Fibromyalgia, History of stroke, Homicidal ideation, Hyperglycemia, Hypertension, Hypotension, IDDM (insulin dependent diabetes mellitus), Lupus ([address] Migraine, Neuropathy, Neuropathy, Polysubstance abuse ([address] Post traumatic stress disorder (PTSD), Posttraumatic stress disorder, Recurrent depression ([address] Recurrent major depressive disorder, in partial remission ([address] Screening mammogram, encounter for, Severe recurrent major depression with psychotic features ([address] Severe recurrent major depression without psychotic features ([address] Stroke (cerebrum) ([facility] [address] [facility] Suicidal ideation, Suicidal intent, Vitamin D deficiency, and White matter changes. Social History:   reports that she has never smoked. She has never used smokeless tobacco. She reports that she does not currently use alcohol. She reports current drug use. Drugs: Marijuana (Weed) and Cocaine. Family History:   Family History   Problem Relation Age of Onset    Diabetes Mother     Stroke Mother     Diabetes Father     Diabetes Sister     Diabetes Brother     Other Son         GSW    No Known Problems Sister        Vitals:  /62   Pulse 92   Temp 98.4 °F (36.9 °C)   Resp 20   Ht 5' 5\" (1.651 m)   Wt 207 lb 10.8 oz (94.2 kg)   LMP  (LMP Unknown)   SpO2 98%   PF (!) 15 L/min   BMI 34.56 kg/m²   No data recorded. Recent Labs     09/17/22  1616 09/18/22  0707 09/18/22  1131 09/18/22 1958   POCGLU 372* 209* 355* 331*         I/O(24Hr):   No intake or output data in the 24 hours ending 09/19/22 0923      Labs:    [unfilled]    Lab Results   Component Value Date/Time    SPECIAL NOT REPORTED 01/27/2022 12:47 PM     Lab Results   Component Value Date/Time    CULTURE NO GROWTH 5 DAYS 08/03/2022 12:18 PM       [unfilled]    Radiology:    ECHO Complete 2D W Doppler W Color    Result Date: 9/10/2022  [facility] Transthoracic Echocardiography Report (TTE)  Patient Name Abdoulaye Argueta    Date of Study               09/09/2022               [facility]   Date of      1965  Gender                      Female  Birth   Age          56 year(s)  Race                        Black   Room Number  2007        Height:                     65 inch, 165.1 cm   Corporate ID [id number]    Weight:                     180 pounds, 81.6 kg  #   Patient Acct [de-identified]   BSA:          1.89 m^2      BMI:      29.95  #                                                              kg/m^2   MR #         [id number]      Sonographer                 Carina Newsome   Accession #  [id number] Interpreting Physician      [address]   Fellow                   Referring Nurse                           Practitioner   Interpreting             Referring Physician  Fellow  Type of Study   TTE procedure:2D Echocardiogram, M-Mode, Doppler, Color Doppler. Procedure Date Date: 09/09/2022 Start: 12:50 PM Study Location: [address] [place] [address] Technical Quality: Fair visualization Indications:Tachycardia. Patient Status: Inpatient Height: 65 inches Weight: 180 pounds BSA: 1.89 m^2 BMI: 29.95 kg/m^2 Rhythm: Sinus tachycardia HR: 108 bpm BP: 98/55 mmHg CONCLUSIONS Summary Left ventricle is normal in size and wall thickness. Global left ventricular systolic function is normal. Estimated LV EF 60-65 %. No obvious wall motion abnormality seen. Left atrium is normal in size. No significant valvular regurgitation or stenosis seen. Signature ----------------------------------------------------------------------------  Electronically signed by Carina Newsome(Sonographer) on 09/09/2022 02:20  PM ---------------------------------------------------------------------------- ----------------------------------------------------------------------------  Electronically signed by Suleiman Ponce(Interpreting physician) on 09/10/2022  08:12 AM ---------------------------------------------------------------------------- FINDINGS Left Atrium Left atrium is normal in size. Left Ventricle Left ventricle is normal in size and wall thickness. Global left ventricular systolic function is normal. Estimated LV EF 60-65 %. No obvious wall motion abnormality seen. Right Atrium Right atrium is normal in size. Right Ventricle Normal right ventricular size and function. Mitral Valve No obvious valvular abnormality seen. No evidence of mitral regurgitation. Aortic Valve No obvious valvular abnormality seen. No evidence of aortic insufficiency or stenosis. Tricuspid Valve Tricuspid valve was not well visualized.  Insignificant tricuspid regurgitation, unable to estimate RVSP. Pulmonic Valve Pulmonic valve was not well visualized. No evidence of pulmonic insufficiency or stenosis. Pericardial Effusion No significant pericardial effusion is seen. Pleural Effusion No pleural effusion seen. Miscellaneous Normal aortic root dimension. IVC not well visualized. E/E' average = 8.5. M-mode / 2D Measurements & Calculations:   LVIDd:3.79 cm(3.7 - 5.6 cm)      Diastolic GDNOHF:03.8 ml  OIDZR:9.20 cm(2.2 - 4.0 cm)      Systolic EJMVFJ:4.58 ml  NLWE:9.10 cm(0.6 - 1.1 cm)       Aortic Root:3.6 cm(2.0 - 3.7 cm)  LVPWd:0.7 cm(0.6 - 1.1 cm)       LA Dimension: 2.3 cm(1.9 - 4.0 cm)  Fractional Shortenin.55 %    LA volume/Index: 33.7 ml /18m^2  Calculated LVEF (%): 86.38 %     LVOT:1.7 cm   Mitral:                              Aortic   Peak E-Wave: 0.75 m/s                Peak Velocity: 1.51 m/s  Peak A-Wave: 1.15 m/s                Mean Velocity: 0.86 m/s  E/A Ratio: 0.65                      Peak Gradient: 9.12 mmHg  Peak Gradient: 2.26 mmHg             Mean Gradient: 4 mmHg  Deceleration Time: 130 msec                                        Area (continuity): 2.26 cm^2                                       AV VTI: 20.6 cm  Septal Wall E' velocity:0.07 m/s Lateral Wall E' velocity:0.12 m/s    XR ABDOMEN (KUB) (SINGLE AP VIEW)    Result Date: 2022  EXAMINATION: ONE SUPINE XRAY VIEW(S) OF THE ABDOMEN 2022 9:33 am COMPARISON: 2022, 2022 HISTORY: ORDERING SYSTEM PROVIDED HISTORY: NGT placement, dark liquid coming through NG TECHNOLOGIST PROVIDED HISTORY: NGT placement, dark liquid coming through NG Reason for Exam: NGT placement, dark liquid coming through NG FINDINGS: Enteric tube coiled in the body of the stomach. Nonobstructive bowel gas pattern. Mild stool burden. Multiple external devices project over the patient. No acute osseous abnormality identified. Enteric tube coiled at the level of the body of the stomach.   Nonobstructive bowel gas pattern. CT HEAD WO CONTRAST    Result Date: 9/8/2022  EXAMINATION: CT OF THE HEAD WITHOUT CONTRAST  9/8/2022 11:38 am TECHNIQUE: CT of the head was performed without the administration of intravenous contrast. Automated exposure control, iterative reconstruction, and/or weight based adjustment of the mA/kV was utilized to reduce the radiation dose to as low as reasonably achievable. COMPARISON: MRI 08/04/2022, 08/03/2022 and CT 08/03/2022. HISTORY: ORDERING SYSTEM PROVIDED HISTORY: Mental Status Changes TECHNOLOGIST PROVIDED HISTORY: Mental Status Changes Decision Support Exception - unselect if not a suspected or confirmed emergency medical condition->Emergency Medical Condition (MA) Is the patient pregnant?->No Reason for Exam: AMS Additional signs and symptoms: AMS FINDINGS: BRAIN/VENTRICLES: There is no acute intracranial hemorrhage, mass effect or midline shift. No abnormal extra-axial fluid collection. Encephalomalacia in the left frontal lobe demonstrated corresponding to recently demonstrated ischemia. Cortical atrophy and chronic white matter changes in the brain and associated ventricular enlargement are again demonstrated. ORBITS: The visualized portion of the orbits demonstrate no acute abnormality. SINUSES: The visualized paranasal sinuses and mastoid air cells demonstrate no acute abnormality. SOFT TISSUES/SKULL:  No acute abnormality of the visualized skull or soft tissues. Chronic findings in the brain without acute CT abnormality identified. Expected evolution of recently demonstrated ischemia in the left frontal lobe. US LIVER    Result Date: 9/10/2022  EXAMINATION: RIGHT UPPER QUADRANT ULTRASOUND 9/10/2022 11:21 am COMPARISON: None. HISTORY: ORDERING SYSTEM PROVIDED HISTORY: elevated lft TECHNOLOGIST PROVIDED HISTORY: elevated lft FINDINGS: LIVER:  The liver demonstrates normal echogenicity without evidence of intrahepatic biliary ductal dilatation.   Hepatopetal flow portal vein. Liver 18.3 cm in length. BILIARY SYSTEM:  Gallstones in the gallbladder. Negative sonographic Gilliland's sign. Mild wall thickening at 4.6 mm. No pericholecystic fluid. Common bile duct is within normal limits measuring 6.8 mm. RIGHT KIDNEY: The right kidney is grossly unremarkable without evidence of hydronephrosis. PANCREAS:  Visualized portions of the pancreas are unremarkable. OTHER: No evidence of right upper quadrant ascites. Unremarkable ultrasound the liver. Cholelithiasis with mild gallbladder wall thickening. Negative sonographic Gilliland's sign. No pericholecystic fluid. Common duct upper normal at 6.8 mm. No intraductal stone demonstrated. Correlation clinical findings and laboratory values required. XR CHEST PORTABLE    Result Date: 9/8/2022  EXAMINATION: ONE XRAY VIEW OF THE CHEST 9/8/2022 7:53 pm COMPARISON: 08/17/2022 HISTORY: ORDERING SYSTEM PROVIDED HISTORY: line placement TECHNOLOGIST PROVIDED HISTORY: line placement Reason for Exam: Line placement FINDINGS: Central venous catheter tip overlies the right atrium. No pleural effusion or pneumothorax. Heart size is at the upper limits of normal.     Right central venous catheter tip overlies the right atrium. No pneumothorax. XR CHEST PORTABLE    Result Date: 8/17/2022  EXAMINATION: ONE XRAY VIEW OF THE CHEST 8/17/2022 8:09 pm COMPARISON: None. HISTORY: ORDERING SYSTEM PROVIDED HISTORY: chest pain TECHNOLOGIST PROVIDED HISTORY: chest pain FINDINGS: Chest radiograph: The cardiomediastinal silhouette and hilar contours are normal. The lungs are clear with no focal consolidation, pleural effusion or pneumothorax. The overlying soft tissue and osseous structures do not demonstrate acute abnormality. No acute intrathoracic pathology.      US RETROPERITONEAL COMPLETE    Result Date: 9/8/2022  EXAMINATION: RETROPERITONEAL ULTRASOUND OF THE KIDNEYS AND URINARY BLADDER 9/8/2022 COMPARISON: None HISTORY: [address] PROVIDED HISTORY: Acute kidney injury TECHNOLOGIST PROVIDED HISTORY: Acute kidney injury 44-year-old female with acute kidney injury FINDINGS: Kidneys: Right kidney measures 10.4 x 4.3 x 5.0 cm. Right renal cortical thickness measures 1.7 cm. Left kidney measures 9.4 x 4.8 x 4.8 cm. Left renal cortical thickness measures 1.8 cm. No hydronephrosis or perinephric fluid. No echogenic foci with posterior acoustic shadowing to suggest renal calculi. Gross preservation of the bilateral corticomedullary differentiation. Bladder: Not imaged. Unremarkable renal ultrasound. No hydronephrosis. Physical Examination:        Constitutional:       Appearance: She is ill-appearing. HENT:      Head: Normocephalic and atraumatic. Nose: No congestion or rhinorrhea. Eyes:      Extraocular Movements: Extraocular movements intact. Conjunctiva/sclera: Conjunctivae normal.      Pupils: Pupils are equal, round, and reactive to light. Cardiovascular:      Rate and Rhythm: Tachycardia present. Pulses: Normal pulses. Heart sounds: Normal heart sounds. No murmur heard. No friction rub. No gallop. Pulmonary:      Effort: Pulmonary effort is normal. No respiratory distress. Breath sounds: Normal breath sounds. No wheezing or rales. Abdominal:      General: Abdomen is flat. Bowel sounds are normal. There is no distension. Palpations: There is no mass. Tenderness: There is no guarding. Musculoskeletal:      Right lower leg: No edema. Left lower leg: No edema. Skin:     Capillary Refill: Capillary refill takes less than 2 seconds. Neurological:      General: No focal deficit present. Mental Status: She is alert but confused and disoriented. Sensory: No sensory deficit. Motor: No weakness.    Psychiatric: was not accessed due to altered mental status      Assessment:        Primary Problem  DKA, type 1, not at goal [facility])    Active Hospital Problems    Diagnosis Date Noted    Diabetic ketoacidosis with coma associated with type 1 diabetes mellitus ([facility] [E10.11] 09/13/2022     Priority: Medium    Coffee ground emesis [K92.0] 09/11/2022     Priority: Medium    Elevated LFTs [R79.89] 09/11/2022     Priority: Medium    Unspecified mood (affective) disorder ([facility] [place] 09/10/2022     Priority: Medium    Uremic encephalopathy [G93.49, N19] 09/09/2022     Priority: Medium    DKA, type 1, not at goal [facility]) [E10.10] 09/08/2022     Priority: Medium    DKA, type 2, not at goal [facility]) [E11.10] 09/08/2022     Priority: Medium    Acute GI bleeding [K92.2] 10/19/2018    Cocaine abuse ([facility] [F14.10] 01/05/2018       Plan:        DKA, 2/2 poor insulin compliance, resolving  - Glucose on admssion: 1,034; glucose today 80  - BMP q4h, initial Mag and Phos levels  - Glucose checks 4 times daily  - Home Lantus 30 units BID   - Home metformin on hold  -IV Normal Saline at 250 mL/hr  -Hypoglycemia, phos and potassium replacement protocols in place   -General surgery placed central line      Acute Kidney Injury pre-renal azotemia secondary to dehydration  -Baseline Cr 0.73;  Cr on admission: 3.56; Cr today 1.00  -Change IV NS infusion to 0.45 NS at 150 ml/hr  -BMP q4 hours  -monitor urine output  -Nephrology consulted      Suspicion for upper GI bleeding  -Coffee-ground emesis from his NG tube, eventually become bilious  -Globin had been steadily climbing since admission, currently is stable around 8  -GI on board  -PPI started  -Upper endoscopy is planned once electrolyte abnormalities resolve     Hypotension secondary to dehydration by osmotic diuresis  -BP today:113/61  -Free water deficit 10 L  -Continue NS infusion     Hypernatremia  -most recent Na+ : 147;   -Continue IV NS infusion to 0.45 NS at 150 ml/hr      Acute metabolic encephalopathy s/s to electrolyte abnormality  -Restraints in place; patient oriented to person only  -Nephrology on board; electrolyte replacement  -EEG ordered -Restraints placed     Polysubstance abuse  -Urine tox positive for Fentanyl and cocaine  -Psychiatry consulted      Ventricular tachycardia  -Non-sustained VT secondary to electrolye imbalance, metabolic derangement and cocaine  -Continue to manage hyperglycemia and electrolytes  -Discontinue metoprolol  -Echo: Estimated LV EF 60-65        Depression with psychotic features  -Continue Haloperidol lactate 5 mg IM q 6 hours PRN  -Continue Mirtazapine 7.5 mg po nightly  -Continue Trazodone 150 mg po nightly  -Continue Venlafaxine 150 mg po daily  -Psychiatry consulted     Asthma  -Continue home Proventil ventolin 2 puff q 4 hours as needed   -Continue home Symbicort 2 puffs BID  -Continue home Flonase 1 spray each nostril daily      Code: Full code  Diet: Adult diet, 3 carbs  DVT prophylaxis: on hold        Sept 19  Feeling much better  GI seen her, EGD done and showed duodenal ulcers, on Protonix, refused this morning  Discharge plan to ECF not a candidate for BHI per psychiatrist  Acute on chronic anemia with some coffee-ground vomit but no significant drop  Hb 7.6  IV Venofer 1 dose  400 mg  Psych seen the patient and deemed patient has not noncompete   Recheck labs CBC, BMP,  Uncontrolled diabetes increase Lantus to 15 twice a day    Right IJ central line and López's catheter has been removed  Pending discharge to [facility]      Juan Ramon Blue MD  9/19/2022  9:23 AM

## 2022-09-20 LAB
GLUCOSE BLD-MCNC: 195 MG/DL (ref 65–105)
GLUCOSE BLD-MCNC: 277 MG/DL (ref 65–105)
GLUCOSE BLD-MCNC: 79 MG/DL (ref 65–105)

## 2022-09-20 PROCEDURE — 1200000000 HC SEMI PRIVATE

## 2022-09-20 PROCEDURE — 6370000000 HC RX 637 (ALT 250 FOR IP): Performed by: INTERNAL MEDICINE

## 2022-09-20 PROCEDURE — 94640 AIRWAY INHALATION TREATMENT: CPT

## 2022-09-20 PROCEDURE — 94761 N-INVAS EAR/PLS OXIMETRY MLT: CPT

## 2022-09-20 PROCEDURE — 6370000000 HC RX 637 (ALT 250 FOR IP): Performed by: STUDENT IN AN ORGANIZED HEALTH CARE EDUCATION/TRAINING PROGRAM

## 2022-09-20 PROCEDURE — 6370000000 HC RX 637 (ALT 250 FOR IP): Performed by: PSYCHIATRY & NEUROLOGY

## 2022-09-20 PROCEDURE — 82947 ASSAY GLUCOSE BLOOD QUANT: CPT

## 2022-09-20 PROCEDURE — 99232 SBSQ HOSP IP/OBS MODERATE 35: CPT | Performed by: INTERNAL MEDICINE

## 2022-09-20 RX ORDER — PANTOPRAZOLE SODIUM 40 MG/1
40 TABLET, DELAYED RELEASE ORAL 2 TIMES DAILY
Status: DISCONTINUED | OUTPATIENT
Start: 2022-09-20 | End: 2022-09-22 | Stop reason: HOSPADM

## 2022-09-20 RX ADMIN — FLUTICASONE PROPIONATE 1 SPRAY: 50 SPRAY, METERED NASAL at 07:54

## 2022-09-20 RX ADMIN — PANTOPRAZOLE SODIUM 40 MG: 40 TABLET, DELAYED RELEASE ORAL at 07:53

## 2022-09-20 RX ADMIN — METFORMIN HYDROCHLORIDE 1000 MG: 1000 TABLET ORAL at 17:18

## 2022-09-20 RX ADMIN — HALOPERIDOL 1 MG: 1 TABLET ORAL at 13:23

## 2022-09-20 RX ADMIN — FOLIC ACID 1 MG: 1 TABLET ORAL at 07:53

## 2022-09-20 RX ADMIN — INSULIN LISPRO 2 UNITS: 100 INJECTION, SOLUTION INTRAVENOUS; SUBCUTANEOUS at 11:29

## 2022-09-20 RX ADMIN — PREGABALIN 75 MG: 75 CAPSULE ORAL at 07:53

## 2022-09-20 RX ADMIN — INSULIN GLARGINE 15 UNITS: 100 INJECTION, SOLUTION SUBCUTANEOUS at 21:15

## 2022-09-20 RX ADMIN — PANTOPRAZOLE SODIUM 40 MG: 40 TABLET, DELAYED RELEASE ORAL at 21:14

## 2022-09-20 RX ADMIN — THIAMINE HCL TAB 100 MG 100 MG: 100 TAB at 07:53

## 2022-09-20 RX ADMIN — TRAZODONE HYDROCHLORIDE 150 MG: 100 TABLET ORAL at 21:14

## 2022-09-20 RX ADMIN — VENLAFAXINE HYDROCHLORIDE 150 MG: 150 CAPSULE, EXTENDED RELEASE ORAL at 07:53

## 2022-09-20 RX ADMIN — PREGABALIN 75 MG: 75 CAPSULE ORAL at 21:15

## 2022-09-20 RX ADMIN — METFORMIN HYDROCHLORIDE 1000 MG: 1000 TABLET ORAL at 07:53

## 2022-09-20 RX ADMIN — INSULIN GLARGINE 15 UNITS: 100 INJECTION, SOLUTION SUBCUTANEOUS at 11:29

## 2022-09-20 RX ADMIN — OLANZAPINE 10 MG: 10 TABLET, ORALLY DISINTEGRATING ORAL at 21:15

## 2022-09-20 RX ADMIN — MIRTAZAPINE 7.5 MG: 15 TABLET, FILM COATED ORAL at 21:14

## 2022-09-20 RX ADMIN — Medication 2 PUFF: at 08:14

## 2022-09-20 RX ADMIN — ATORVASTATIN CALCIUM 40 MG: 40 TABLET, FILM COATED ORAL at 21:15

## 2022-09-20 ASSESSMENT — PAIN SCALES - GENERAL: PAINLEVEL_OUTOF10: 0

## 2022-09-20 NOTE — PROGRESS NOTES
Patient friendly and receptive; patient talked about her physical symptoms and welcomed prayer;     09/20/22 7414   Encounter Summary   Encounter Overview/Reason  Spiritual/Emotional Needs   Service Provided For: Patient   Referral/Consult From: Patient   Last Encounter  09/20/22   Complexity of Encounter Moderate   Spiritual/Emotional needs   Type Spiritual Support   Assessment/Intervention/Outcome   Assessment Other (Comment)  (welcoming)   Intervention Active listening;Discussed illness injury and its impact; Explored/Affirmed feelings, thoughts, concerns;Prayer (assurance of)/[place];Sustaining Presence/Ministry of presence   Outcome Engaged in conversation;Expressed feelings, needs, and concerns;Expressed Gratitude;Receptive

## 2022-09-20 NOTE — PLAN OF CARE
Problem: Discharge Planning  Goal: Discharge to home or other facility with appropriate resources  9/20/2022 1001 by Adilson Sam RN  Outcome: Progressing     Problem: Pain  Goal: Verbalizes/displays adequate comfort level or baseline comfort level  9/20/2022 1001 by Adilson Sam RN  Outcome: Progressing     Problem: Skin/Tissue Integrity  Goal: Absence of new skin breakdown  Description: 1. Monitor for areas of redness and/or skin breakdown  2. Assess vascular access sites hourly  3. Every 4-6 hours minimum:  Change oxygen saturation probe site  4. Every 4-6 hours:  If on nasal continuous positive airway pressure, respiratory therapy assess nares and determine need for appliance change or resting period. 9/20/2022 1001 by Adilson Sam RN  Outcome: Progressing     Problem: Safety - Adult  Goal: Free from fall injury  9/20/2022 1001 by Adilson Sam RN  Outcome: Progressing     Problem: Skin/Tissue Integrity  Goal: Absence of new skin breakdown  Description: 1. Monitor for areas of redness and/or skin breakdown  2. Assess vascular access sites hourly  3. Every 4-6 hours minimum:  Change oxygen saturation probe site  4. Every 4-6 hours:  If on nasal continuous positive airway pressure, respiratory therapy assess nares and determine need for appliance change or resting period. 9/20/2022 1001 by Adilson Sam RN  Outcome: Progressing  9/20/2022 0424 by Drew Hernandez RN  Outcome: Not Progressing  Note: Pt refused full skin assessment. Problem: Safety - Adult  Goal: Free from fall injury  9/20/2022 1001 by Adilson Sam RN  Outcome: Progressing  9/20/2022 0424 by Drew Hernandez RN  Outcome: Not Progressing  Note: Pt assessed as a fall risk. Pt did not call out when she needed to get out of bed.

## 2022-09-20 NOTE — CARE COORDINATION
SW spoke with patient's daughter Ana Maria. Ana Maria stated that she has not yet been able to get in contact with her mothers apartment complex. CLAYTON informed her that per Dr. Marco A Weems note , Patient would benefit from a decision maker, or perhaps a guardian due to lacking insight into her illness and not properly being able to care for herself. SW discussed this with Ananya at [facility]. Ana Maria stated that she was contacted by Rhonda Brown, and Merry Solano is supposed to reach back out to her with more information. SW informed Ana Maria that SW was informed that the cost for guardianship is usually $1,200-$1,500. Ana Maria stated that she would not be able to afford this cost. Ana Maria is open to the idea of being the guardian for her mother but she wants more information on this, and wants to know if the fee can be waived. SW informed her that she could reach out to the probate court to get specific answers to these questions. Ana Maria stated that she would not have time to call the court and asked SW if she would be able to. SW stated that she would call in the morning, and try her best to get these answers for her. SW also informed her that if she does not want to be the guardian, we could try to have a court appointed guardian in place. SW informed her that once a guardian is in place, there is always an option of a facility that she can go to that would provide 24 hour care. Ana Maria stated that she would not be able to take her mother home with her , but she is willing to look at the guardianship process. SW informed her that we would also check with the psychiatrist to confirm that he would be in agreement to complete guardianship paperwork. Perfect serve sent to Dr. Elizabeth Rangel regarding this.      Electronically signed by Gabe Melissa on 9/20/22 at 4:46 PM EDT

## 2022-09-20 NOTE — CARE COORDINATION
Spoke with patients daughter Joselyn Wen. She identified that she had been waiting on a call back from staff. She has not gotten verification yet that the patient is able to return to her apartment. She has talked with her mom when first admitted. Will contact SW to follow up on this discharge plan. Daughter is not clear about guardianship, if she would want to  and briefly discussed this with her. Unclear if she would qualify or what the plan is. Will check with staff working with patient to follow up with daughter on discharge plan.

## 2022-09-20 NOTE — PROGRESS NOTES
Pt noncompliant last night, refused full set of vitals. Pt reluctantly took most of medications, refusing one (see MAR). Pt stated that she did not want to be woken up again. Pt awoke in morning around 5am, very anxious and combative. Pt refused IV start attempt and repeatedly turned own bed alarm off.

## 2022-09-20 NOTE — PROGRESS NOTES
[address]    PROGRESS NOTE             9/20/2022    9:56 AM    Name:   Sandi Atikns  MRN:     [id number]     Acct:      [de-identified]   Room:   2047/2047-01  IP Day:  12  Admit Date:  9/8/2022 11:01 AM    PCP:  Sukhdev Jaime MD  Code Status:  Full Code    Subjective:     C/C:   Chief Complaint   Patient presents with    Hyperglycemia    Altered Mental Status     Interval History Status: improved. Patient was seen and evaluated at bedside. S/p EGD   Refusing peripheral line   CL dced       Brief History:     The patient is a 56 y.o. Non- / non  female with a history of asthma, bipolar 1 disorder, hypertension, stroke, polysubstance abuse, DM 2, degenerative disc disease, and depression with psychotic features, who presented to the ED with altered mental status. The patient was found unresponsive at her home where she lives alone. The patient had not been taking her insulin as her refrigerator was filled with unused insulin vials. The patients family had not heard from the patient for several days and EMS was called. The patient was found confused and obtunded, hypotensive, and hyperglycemic. The ED, the patient was hypotensive (BP 86/64) and tachypneic (RR 24). Blood glucose was 1,034. Serum potassium was 5.5, bicarbonate was 19 mmol/L [anion gap 25 mmol/L). Cr was 3.56 (baseline Cr 0.73). Levophed was administered. The patient was admitted for management of DKA and DKA protocol was initiated    Review of Systems:     Reason unable to perform ROS: Pt is uncooperative      Medications: Allergies:     Allergies   Allergen Reactions    Bactrim [Sulfamethoxazole-Trimethoprim] Swelling    Adhesive Tape Rash       Current Meds:   Scheduled Meds:    pantoprazole  40 mg Oral BID    insulin glargine  15 Units SubCUTAneous BID    OLANZapine zydis  10 mg Oral Nightly    pregabalin  75 mg Oral BID    thiamine mononitrate  100 mg Oral Daily    folic acid  1 mg Oral Daily    insulin lispro  0-4 Units SubCUTAneous TID     insulin lispro  0-4 Units SubCUTAneous Nightly    atorvastatin  40 mg Oral Nightly    budesonide-formoterol  2 puff Inhalation BID    metFORMIN  1,000 mg Oral BID     mirtazapine  7.5 mg Oral Nightly    traZODone  150 mg Oral Nightly    venlafaxine  150 mg Oral Daily with breakfast    fluticasone  1 spray Each Nostril Daily     Continuous Infusions:    sodium chloride      sodium chloride Stopped (09/16/22 1833)    dextrose       PRN Meds: sodium chloride, haloperidol, potassium chloride, magnesium sulfate, acetaminophen, glucose, dextrose bolus **OR** dextrose bolus, dextrose, glucagon (rDNA), albuterol sulfate HFA, sodium phosphate IVPB **OR** sodium phosphate IVPB **OR** sodium phosphate IVPB, polyethylene glycol    Data:     Past Medical History:   has a past medical history of Acid reflux, Acute cystitis with hematuria, Acute non-recurrent maxillary sinusitis, Asthma, Bipolar 1 disorder ([address] Bipolar disorder, mixed ([address] BMI 34.0-34.9,adult, Cannabis use disorder, severe, dependence ([address] Cerebrovascular accident (CVA) ([address] Chest pain, Chronic renal insufficiency, Cocaine abuse ([address] COVID-19 virus RNA test result unknown, DDD (degenerative disc disease), cervical, Diabetes mellitus ([address] Dizziness, Fibromyalgia, History of stroke, Homicidal ideation, Hyperglycemia, Hypertension, Hypotension, IDDM (insulin dependent diabetes mellitus), Lupus ([address] Migraine, Neuropathy, Neuropathy, Polysubstance abuse ([address] Post traumatic stress disorder (PTSD), Posttraumatic stress disorder, Recurrent depression ([address] Recurrent major depressive disorder, in partial remission ([address] Screening mammogram, encounter for, Severe recurrent major depression with psychotic features ([address] Severe recurrent major depression without psychotic features ([address] Stroke (cerebrum) ([address] Stroke ([address] Suicidal ideation, Suicidal intent, Vitamin D deficiency, and White matter changes. Social History:   reports that she has never smoked. She has never used smokeless tobacco. She reports that she does not currently use alcohol. She reports current drug use. Drugs: Marijuana (Weed) and Cocaine. Family History:   Family History   Problem Relation Age of Onset    Diabetes Mother     Stroke Mother     Diabetes Father     Diabetes Sister     Diabetes Brother     Other Son         GSW    No Known Problems Sister        Vitals:  BP (!) 105/53   Pulse 84   Temp 98.4 °F (36.9 °C)   Resp 16   Ht 5' 5\" (1.651 m)   Wt 207 lb 10.8 oz (94.2 kg)   LMP  (LMP Unknown)   SpO2 97%   PF (!) 15 L/min   BMI 34.56 kg/m²   No data recorded. Recent Labs     09/19/22  1018 09/19/22  1642 09/19/22  1920 09/20/22  0520   POCGLU 313* 248* 175* 79         I/O(24Hr):     Intake/Output Summary (Last 24 hours) at 9/20/2022 0956  Last data filed at 9/20/2022 7786  Gross per 24 hour   Intake 800 ml   Output --   Net 800 ml         Labs:    [unfilled]    Lab Results   Component Value Date/Time    SPECIAL NOT REPORTED 01/27/2022 12:47 PM     Lab Results   Component Value Date/Time    CULTURE NO GROWTH 5 DAYS 08/03/2022 12:18 PM       [unfilled]    Radiology:    ECHO Complete 2D W Doppler W Color    Result Date: 9/10/2022  [facility] Transthoracic Echocardiography Report (TTE)  Patient Name Rita Cabezas    Date of Study               09/09/2022               [facility]   Date of      1965  Gender                      Female  Birth   Age          56 year(s)  Race                        Black   Room Number  2007        Height:                     65 inch, 165.1 cm   Corporate ID [id number]    Weight:                     180 pounds, 81.6 kg  #   Patient Acct [de-identified]   BSA:          1.89 m^2      BMI:      29.95  #                                                              kg/m^2   MR #         [id number]      Sonographer Carina Newsome   Accession #  [id number]  Interpreting Physician      [address]   Fellow                   Referring Nurse                           Practitioner   Interpreting             Referring Physician  Fellow  Type of Study   TTE procedure:2D Echocardiogram, M-Mode, Doppler, Color Doppler. Procedure Date Date: 09/09/2022 Start: 12:50 PM Study Location: [address] [place] [address] Technical Quality: Fair visualization Indications:Tachycardia. Patient Status: Inpatient Height: 65 inches Weight: 180 pounds BSA: 1.89 m^2 BMI: 29.95 kg/m^2 Rhythm: Sinus tachycardia HR: 108 bpm BP: 98/55 mmHg CONCLUSIONS Summary Left ventricle is normal in size and wall thickness. Global left ventricular systolic function is normal. Estimated LV EF 60-65 %. No obvious wall motion abnormality seen. Left atrium is normal in size. No significant valvular regurgitation or stenosis seen. Signature ----------------------------------------------------------------------------  Electronically signed by Carina Newsome(Sonographer) on 09/09/2022 02:20  PM ---------------------------------------------------------------------------- ----------------------------------------------------------------------------  Electronically signed by Suleiman Ponce(Interpreting physician) on 09/10/2022  08:12 AM ---------------------------------------------------------------------------- FINDINGS Left Atrium Left atrium is normal in size. Left Ventricle Left ventricle is normal in size and wall thickness. Global left ventricular systolic function is normal. Estimated LV EF 60-65 %. No obvious wall motion abnormality seen. Right Atrium Right atrium is normal in size. Right Ventricle Normal right ventricular size and function. Mitral Valve No obvious valvular abnormality seen. No evidence of mitral regurgitation. Aortic Valve No obvious valvular abnormality seen. No evidence of aortic insufficiency or stenosis.  Tricuspid Valve Tricuspid valve was not well visualized. Insignificant tricuspid regurgitation, unable to estimate RVSP. Pulmonic Valve Pulmonic valve was not well visualized. No evidence of pulmonic insufficiency or stenosis. Pericardial Effusion No significant pericardial effusion is seen. Pleural Effusion No pleural effusion seen. Miscellaneous Normal aortic root dimension. IVC not well visualized. E/E' average = 8.5. M-mode / 2D Measurements & Calculations:   LVIDd:3.79 cm(3.7 - 5.6 cm)      Diastolic GRTJLF:06.4 ml  LFSYY:0.73 cm(2.2 - 4.0 cm)      Systolic YCAZKC:8.19 ml  TXCU:6.47 cm(0.6 - 1.1 cm)       Aortic Root:3.6 cm(2.0 - 3.7 cm)  LVPWd:0.7 cm(0.6 - 1.1 cm)       LA Dimension: 2.3 cm(1.9 - 4.0 cm)  Fractional Shortenin.55 %    LA volume/Index: 33.7 ml /18m^2  Calculated LVEF (%): 86.38 %     LVOT:1.7 cm   Mitral:                              Aortic   Peak E-Wave: 0.75 m/s                Peak Velocity: 1.51 m/s  Peak A-Wave: 1.15 m/s                Mean Velocity: 0.86 m/s  E/A Ratio: 0.65                      Peak Gradient: 9.12 mmHg  Peak Gradient: 2.26 mmHg             Mean Gradient: 4 mmHg  Deceleration Time: 130 msec                                        Area (continuity): 2.26 cm^2                                       AV VTI: 20.6 cm  Septal Wall E' velocity:0.07 m/s Lateral Wall E' velocity:0.12 m/s    XR ABDOMEN (KUB) (SINGLE AP VIEW)    Result Date: 2022  EXAMINATION: ONE SUPINE XRAY VIEW(S) OF THE ABDOMEN 2022 9:33 am COMPARISON: 2022, 2022 HISTORY: ORDERING SYSTEM PROVIDED HISTORY: NGT placement, dark liquid coming through NG TECHNOLOGIST PROVIDED HISTORY: NGT placement, dark liquid coming through NG Reason for Exam: NGT placement, dark liquid coming through NG FINDINGS: Enteric tube coiled in the body of the stomach. Nonobstructive bowel gas pattern. Mild stool burden. Multiple external devices project over the patient. No acute osseous abnormality identified.      Enteric tube coiled at the level of the body of the stomach. Nonobstructive bowel gas pattern. CT HEAD WO CONTRAST    Result Date: 9/8/2022  EXAMINATION: CT OF THE HEAD WITHOUT CONTRAST  9/8/2022 11:38 am TECHNIQUE: CT of the head was performed without the administration of intravenous contrast. Automated exposure control, iterative reconstruction, and/or weight based adjustment of the mA/kV was utilized to reduce the radiation dose to as low as reasonably achievable. COMPARISON: MRI 08/04/2022, 08/03/2022 and CT 08/03/2022. HISTORY: ORDERING SYSTEM PROVIDED HISTORY: Mental Status Changes TECHNOLOGIST PROVIDED HISTORY: Mental Status Changes Decision Support Exception - unselect if not a suspected or confirmed emergency medical condition->Emergency Medical Condition (MA) Is the patient pregnant?->No Reason for Exam: AMS Additional signs and symptoms: AMS FINDINGS: BRAIN/VENTRICLES: There is no acute intracranial hemorrhage, mass effect or midline shift. No abnormal extra-axial fluid collection. Encephalomalacia in the left frontal lobe demonstrated corresponding to recently demonstrated ischemia. Cortical atrophy and chronic white matter changes in the brain and associated ventricular enlargement are again demonstrated. ORBITS: The visualized portion of the orbits demonstrate no acute abnormality. SINUSES: The visualized paranasal sinuses and mastoid air cells demonstrate no acute abnormality. SOFT TISSUES/SKULL:  No acute abnormality of the visualized skull or soft tissues. Chronic findings in the brain without acute CT abnormality identified. Expected evolution of recently demonstrated ischemia in the left frontal lobe. US LIVER    Result Date: 9/10/2022  EXAMINATION: RIGHT UPPER QUADRANT ULTRASOUND 9/10/2022 11:21 am COMPARISON: None.  HISTORY: ORDERING SYSTEM PROVIDED HISTORY: elevated lft TECHNOLOGIST PROVIDED HISTORY: elevated lft FINDINGS: LIVER:  The liver demonstrates normal echogenicity without evidence of intrahepatic biliary ductal dilatation. Hepatopetal flow portal vein. Liver 18.3 cm in length. BILIARY SYSTEM:  Gallstones in the gallbladder. Negative sonographic Gilliland's sign. Mild wall thickening at 4.6 mm. No pericholecystic fluid. Common bile duct is within normal limits measuring 6.8 mm. RIGHT KIDNEY: The right kidney is grossly unremarkable without evidence of hydronephrosis. PANCREAS:  Visualized portions of the pancreas are unremarkable. OTHER: No evidence of right upper quadrant ascites. Unremarkable ultrasound the liver. Cholelithiasis with mild gallbladder wall thickening. Negative sonographic Gilliland's sign. No pericholecystic fluid. Common duct upper normal at 6.8 mm. No intraductal stone demonstrated. Correlation clinical findings and laboratory values required. XR CHEST PORTABLE    Result Date: 9/8/2022  EXAMINATION: ONE XRAY VIEW OF THE CHEST 9/8/2022 7:53 pm COMPARISON: 08/17/2022 HISTORY: ORDERING SYSTEM PROVIDED HISTORY: line placement TECHNOLOGIST PROVIDED HISTORY: line placement Reason for Exam: Line placement FINDINGS: Central venous catheter tip overlies the right atrium. No pleural effusion or pneumothorax. Heart size is at the upper limits of normal.     Right central venous catheter tip overlies the right atrium. No pneumothorax. XR CHEST PORTABLE    Result Date: 8/17/2022  EXAMINATION: ONE XRAY VIEW OF THE CHEST 8/17/2022 8:09 pm COMPARISON: None. HISTORY: ORDERING SYSTEM PROVIDED HISTORY: chest pain TECHNOLOGIST PROVIDED HISTORY: chest pain FINDINGS: Chest radiograph: The cardiomediastinal silhouette and hilar contours are normal. The lungs are clear with no focal consolidation, pleural effusion or pneumothorax. The overlying soft tissue and osseous structures do not demonstrate acute abnormality. No acute intrathoracic pathology.      US RETROPERITONEAL COMPLETE    Result Date: 9/8/2022  EXAMINATION: RETROPERITONEAL ULTRASOUND OF THE KIDNEYS AND URINARY BLADDER 9/8/2022 COMPARISON: None HISTORY: ORDERING SYSTEM PROVIDED HISTORY: Acute kidney injury TECHNOLOGIST PROVIDED HISTORY: Acute kidney injury 17-year-old female with acute kidney injury FINDINGS: Kidneys: Right kidney measures 10.4 x 4.3 x 5.0 cm. Right renal cortical thickness measures 1.7 cm. Left kidney measures 9.4 x 4.8 x 4.8 cm. Left renal cortical thickness measures 1.8 cm. No hydronephrosis or perinephric fluid. No echogenic foci with posterior acoustic shadowing to suggest renal calculi. Gross preservation of the bilateral corticomedullary differentiation. Bladder: Not imaged. Unremarkable renal ultrasound. No hydronephrosis. Physical Examination:        Constitutional:       Appearance: She is ill-appearing. HENT:      Head: Normocephalic and atraumatic. Nose: No congestion or rhinorrhea. Eyes:      Extraocular Movements: Extraocular movements intact. Conjunctiva/sclera: Conjunctivae normal.      Pupils: Pupils are equal, round, and reactive to light. Cardiovascular:      Rate and Rhythm: Tachycardia present. Pulses: Normal pulses. Heart sounds: Normal heart sounds. No murmur heard. No friction rub. No gallop. Pulmonary:      Effort: Pulmonary effort is normal. No respiratory distress. Breath sounds: Normal breath sounds. No wheezing or rales. Abdominal:      General: Abdomen is flat. Bowel sounds are normal. There is no distension. Palpations: There is no mass. Tenderness: There is no guarding. Musculoskeletal:      Right lower leg: No edema. Left lower leg: No edema. Skin:     Capillary Refill: Capillary refill takes less than 2 seconds. Neurological:      General: No focal deficit present. Mental Status: She is alert but confused and disoriented. Sensory: No sensory deficit. Motor: No weakness.    Psychiatric: was not accessed due to altered mental status      Assessment:        Primary Problem  DKA, type 1, not at goal [facility])    C/Ade Nails 0559 replacement  -EEG ordered    -Restraints placed     Polysubstance abuse  -Urine tox positive for Fentanyl and cocaine  -Psychiatry consulted      Ventricular tachycardia  -Non-sustained VT secondary to electrolye imbalance, metabolic derangement and cocaine  -Continue to manage hyperglycemia and electrolytes  -Discontinue metoprolol  -Echo: Estimated LV EF 60-65        Depression with psychotic features  -Continue Haloperidol lactate 5 mg IM q 6 hours PRN  -Continue Mirtazapine 7.5 mg po nightly  -Continue Trazodone 150 mg po nightly  -Continue Venlafaxine 150 mg po daily  -Psychiatry consulted     Asthma  -Continue home Proventil ventolin 2 puff q 4 hours as needed   -Continue home Symbicort 2 puffs BID  -Continue home Flonase 1 spray each nostril daily      Code: Full code  Diet: Adult diet, 3 carbs  DVT prophylaxis: on hold        Sept 20  Feeling much better  GI seen her, EGD done and showed duodenal ulcers, on Protonix, refused tmost treatement  Discharge plan to [facility] not a candidate for [facility] per psychiatrist  Acute on chronic anemia with some coffee-ground vomit but no significant drop  Hb 7.6  IV Venofer 1 dose  400 mg  Psych seen the patient and deemed patient has not noncompetent   Recheck labs CBC, BMP,  Uncontrolled diabetes increase Lantus to 15 twice a day    Right IJ central line and López's catheter has been removed  Pending discharge to [facility]      Darrius Griffiths MD  9/20/2022  9:56 AM

## 2022-09-20 NOTE — PLAN OF CARE
Problem: Skin/Tissue Integrity  Goal: Absence of new skin breakdown  Description: 1. Monitor for areas of redness and/or skin breakdown  2. Assess vascular access sites hourly  3. Every 4-6 hours minimum:  Change oxygen saturation probe site  4. Every 4-6 hours:  If on nasal continuous positive airway pressure, respiratory therapy assess nares and determine need for appliance change or resting period. 9/20/2022 0424 by Shiraz Garcia RN  Outcome: Not Progressing  Note: Pt refused full skin assessment. Problem: Safety - Adult  Goal: Free from fall injury  9/20/2022 0424 by Shiraz Garcia RN  Outcome: Not Progressing  Note: Pt assessed as a fall risk. Pt did not call out when she needed to get out of bed. Problem: Discharge Planning  Goal: Discharge to home or other facility with appropriate resources  9/20/2022 0424 by Shiraz Garcia RN  Outcome: Progressing     Problem: Pain  Goal: Verbalizes/displays adequate comfort level or baseline comfort level  9/20/2022 0424 by Shiraz Garcia RN  Outcome: Progressing  Note: During shift assessment, pt stated \"yeah the same things hurt that always do I don't want to go over it again. \" However, pt was able to rest peacefully throughout night.

## 2022-09-20 NOTE — PROGRESS NOTES
BEHAVIORAL HEALTH FOLLOW-UP NOTE     9/19/2022     Patient was seen and examined in person, Chart reviewed   Patient's case discussed with staff/team    Chief Complaint: Altered mental status and bipolar disorder    Interim History:        The patient was seen at bedside. The patient had no recollection of our previous meetings. She has been refusing various aspects of her care over the weekend. She has no insight into her illness. is not oriented to place. She believes she is at [facility].  She could not tell me the day date month or year. She has no knowledge of her medical problems. She denies depressive symptoms though she displays mood lability. Needed PRN Haldol yesterday.      BP (!) 105/53   Pulse 98   Temp 98.4 °F (36.9 °C)   Resp 18   Ht 5' 5\" (1.651 m)   Wt 207 lb 10.8 oz (94.2 kg)   LMP  (LMP Unknown)   SpO2 98%   PF (!) 15 L/min   BMI 34.56 kg/m²        Energy:    [x] Normal/Unchanged  [] Increased  [] Decreased        Aggression:  [] yes  [x] no       PAST MEDICAL/PSYCHIATRIC HISTORY:   Past Medical History:   Diagnosis Date    Acid reflux 5/29/2017    Acute cystitis with hematuria 10/11/2016    Acute non-recurrent maxillary sinusitis 11/28/2017    Asthma     Bipolar 1 disorder ([address] 1/4/2018    Bipolar disorder, mixed ([address]     BMI 34.0-34.9,adult 11/28/2017    Cannabis use disorder, severe, dependence ([address] 12/19/2017    Cerebrovascular accident (CVA) ([address] 6/14/2017    Chest pain 11/5/2016    Chronic renal insufficiency     Cocaine abuse ([address] 1/5/2018    COVID-19 virus RNA test result unknown     DDD (degenerative disc disease), cervical     Diabetes mellitus ([address]     Dizziness 6/13/2017    Fibromyalgia     History of stroke 9/9/2017    Homicidal ideation 11/6/2017    Hyperglycemia     Hypertension     Hypotension 1/18/2019    IDDM (insulin dependent diabetes mellitus) 12/21/2015    Lupus ([address]     Migraine     Neuropathy     Neuropathy     Polysubstance abuse ([address] 9/17/2017    Post traumatic stress disorder (PTSD)     Posttraumatic stress disorder 5/29/2017    Recurrent depression ([facility] 5/29/2017    Recurrent major depressive disorder, in partial remission ([facility] 11/28/2017    Screening mammogram, encounter for 11/28/2017    Severe recurrent major depression with psychotic features ([facility] 12/19/2017    Severe recurrent major depression without psychotic features ([facility] 12/19/2017    Stroke (cerebrum) ([facility] 6/14/2017    Stroke ([facility]     per chart notes    Suicidal ideation     Suicidal intent 3/10/2017    Vitamin D deficiency 11/28/2017    White matter changes 6/13/2017       FAMILY/SOCIAL HISTORY:  Family History   Problem Relation Age of Onset    Diabetes Mother     Stroke Mother     Diabetes Father     Diabetes Sister     Diabetes Brother     Other Son         GSW    No Known Problems Sister      Social History     Socioeconomic History    Marital status: Legally      Spouse name: Not on file    Number of children: Not on file    Years of education: Not on file    Highest education level: Not on file   Occupational History    Not on file   Tobacco Use    Smoking status: Never    Smokeless tobacco: Never   Vaping Use    Vaping Use: Never used   Substance and Sexual Activity    Alcohol use: Not Currently    Drug use: Yes     Types: Marijuana (Charissa Perez), Cocaine     Comment: + Cocaine 2/2021 also, see Care Everywhere UDS    Sexual activity: Not Currently     Partners: Male   Other Topics Concern    Not on file   Social History Narrative    Not on file     Social Determinants of Health     Financial Resource Strain: Not on file   Food Insecurity: Not on file   Transportation Needs: Not on file   Physical Activity: Not on file   Stress: Not on file   Social Connections: Unknown    Frequency of Communication with Friends and Family: Once a week    Frequency of Social Gatherings with Friends and Family: Not on file    Attends Taoist Services: Not on file    Active Member of Clubs or Organizations: No    Attends Club or Organization Meetings: Never    Marital Status:    Intimate Partner Violence: Not on file   Housing Stability: Not on file           ROS:  [x] All negative/unchanged except if checked.  Explain positive(checked items) below:  [] Constitutional  [] Eyes  [] Ear/Nose/Mouth/Throat  [] Respiratory  [] CV  [] GI  []   [] Musculoskeletal  [] Skin/Breast  [] Neurological  [] Endocrine  [] Heme/Lymph  [] Allergic/Immunologic    Explanation:     MEDICATIONS:    Current Facility-Administered Medications:     insulin glargine (LANTUS) injection vial 15 Units, 15 Units, SubCUTAneous, BID, Lucy Wheeler MD, 15 Units at 09/19/22 1027    pregabalin (LYRICA) capsule 75 mg, 75 mg, Oral, BID, Rob Gould MD, 75 mg at 09/19/22 1008    0.9 % sodium chloride infusion, , IntraVENous, PRN, Cristiane Doe MD    OLANZapine zydis (ZYPREXA) disintegrating tablet 7.5 mg, 7.5 mg, Oral, Nightly, Cristiane Doe MD, 7.5 mg at 09/19/22 0021    thiamine mononitrate tablet 100 mg, 100 mg, Oral, Daily, Cristiane Doe MD, 100 mg at 32/09/71 2963    folic acid (FOLVITE) tablet 1 mg, 1 mg, Oral, Daily, Cristiane Doe MD, 1 mg at 09/19/22 0956    haloperidol (HALDOL) tablet 1 mg, 1 mg, Oral, Q6H PRN, Cristiane Doe MD, 1 mg at 09/18/22 1529    0.9 % sodium chloride infusion, , IntraVENous, Continuous, Cristiane Doe MD, Stopped at 09/16/22 1833    potassium chloride 10 mEq/100 mL IVPB (Peripheral Line), 10 mEq, IntraVENous, PRN, Cristiane Doe MD, Stopped at 09/13/22 1312    magnesium sulfate 1000 mg in dextrose 5% 100 mL IVPB, 1,000 mg, IntraVENous, PRN, Cristiane Doe MD, Stopped at 09/12/22 2113    acetaminophen (TYLENOL) tablet 650 mg, 650 mg, Oral, Q4H PRN, Cristiane Doe MD, 650 mg at 09/18/22 1517    pantoprazole (PROTONIX) 40 mg in sodium chloride (PF) 10 mL injection, 40 mg, IntraVENous, BID, Cristiane Doe MD, 40 mg at 09/17/22 0030    insulin lispro (HUMALOG) injection vial 0-4 Units, 0-4 Units, SubCUTAneous, TID WC, Ilya Jasmine MD, 1 Units at 09/19/22 1717    insulin lispro (HUMALOG) injection vial 0-4 Units, 0-4 Units, SubCUTAneous, Nightly, Ilya Jasmine MD, 4 Units at 09/17/22 0047    glucose chewable tablet 16 g, 4 tablet, Oral, PRN, Ilya Jasmine MD    dextrose bolus 10% 125 mL, 125 mL, IntraVENous, PRN **OR** dextrose bolus 10% 250 mL, 250 mL, IntraVENous, PRN, Ilya Jasmine MD    dextrose 10 % infusion, , IntraVENous, Continuous PRN, Ilya Jasmine MD    glucagon (rDNA) injection 1 mg, 1 mg, SubCUTAneous, PRN, Ilya Jasmine MD    dextrose 10 % infusion, , IntraVENous, Continuous PRN, Ilya Jasmine MD    albuterol sulfate HFA (PROVENTIL;VENTOLIN;PROAIR) 108 (90 Base) MCG/ACT inhaler 2 puff, 2 puff, Inhalation, Q4H PRN, Ilya Jasmine MD    atorvastatin (LIPITOR) tablet 40 mg, 40 mg, Oral, Nightly, Ilya Jasmine MD, 40 mg at 09/19/22 0022    budesonide-formoterol (SYMBICORT) 80-4.5 MCG/ACT inhaler 2 puff, 2 puff, Inhalation, BID, Ilya Jasmine MD, 2 puff at 09/18/22 0749    metFORMIN (GLUCOPHAGE) tablet 1,000 mg, 1,000 mg, Oral, BID , Jose Macedo MD, 1,000 mg at 09/19/22 1716    mirtazapine (REMERON) tablet 7.5 mg, 7.5 mg, Oral, Nightly, Ilya Jasmine MD, 7.5 mg at 09/19/22 0021    traZODone (DESYREL) tablet 150 mg, 150 mg, Oral, Nightly, Ilya Jasmine MD, 150 mg at 09/19/22 0020    venlafaxine (EFFEXOR XR) extended release capsule 150 mg, 150 mg, Oral, Daily with breakfast, Ilya Jasmine MD, 150 mg at 09/19/22 1007    fluticasone (FLONASE) 50 MCG/ACT nasal spray 1 spray, 1 spray, Each Nostril, Daily, Ilya Jasmine MD, 1 spray at 09/11/22 0901    sodium phosphate 10 mmol in sodium chloride 0.9 % 250 mL IVPB, 10 mmol, IntraVENous, PRN, Stopped at 09/13/22 1311 **OR** sodium phosphate 15 mmol in dextrose 5 % 250 mL IVPB, 15 mmol, IntraVENous, PRN, Stopped at 09/13/22 0125 **OR** sodium phosphate 20 mmol in dextrose 5 % 500 mL IVPB, 20 mmol, IntraVENous, PRN, Timothy Marv Chambers MD    polyethylene glycol (GLYCOLAX) packet 17 g, 17 g, Oral, Daily PRN, Jorge A Valencia MD      Examination:  BP (!) 105/53   Pulse 98   Temp 98.4 °F (36.9 °C)   Resp 18   Ht 5' 5\" (1.651 m)   Wt 207 lb 10.8 oz (94.2 kg)   LMP  (LMP Unknown)   SpO2 98%   PF (!) 15 L/min   BMI 34.56 kg/m²      Medication side effects(SE): none    Mental Status Examination:    Level of consciousness:  within normal limits   Appearance:  fair grooming and fair hygiene  Behavior/Motor:  no abnormalities noted  Attitude toward examiner:  cooperative  Speech:  slow and normal volume  Mood: labile  Affect:  mood congruent  Thought processes:  loose associations. Thought content:  Homicidal ideation - none  Suicidal Ideation:  denies suicidal ideation  Delusions:  no evidence of delusions  Perceptual Disturbance:  denies any perceptual disturbance  Cognition:  disoriented   Concentration distractible  Insight poor   Judgement poor     ASSESSMENT:   Patient symptoms are:  [] Well controlled  [] Improving  [] Worsening  [x] No change      Diagnosis: Delirium due to medical condition  Principal Problem:    DKA, type 1, not at goal [facility])  Active Problems:    DKA, type 2, not at goal [facility])    Uremic encephalopathy    Unspecified mood (affective) disorder (HCC)    Coffee ground emesis    Elevated LFTs    Diabetic ketoacidosis with coma associated with type 1 diabetes mellitus ([facility]    Cocaine abuse ([facility]    Acute GI bleeding  Resolved Problems:    * No resolved hospital problems.  *      LABS:    Recent Labs     09/17/22  0930   WBC 9.4   HGB 7.6*        Recent Labs     09/17/22  0930      K 4.0      CO2 25   BUN 6   CREATININE 0.68   GLUCOSE 269*     Recent Labs     09/17/22  0930   BILITOT 0.3   ALKPHOS 150*   AST 9   ALT 8     Lab Results   Component Value Date/Time    BARBSCNU NEGATIVE 09/08/2022 05:29 PM    LABBENZ NEGATIVE 09/08/2022 05:29 PM    LABMETH NEGATIVE 09/08/2022 05:29 PM    PPXUR NOT REPORTED 11/04/2021 03:43 PM     Lab Results   Component Value Date/Time    TSH 1.10 08/04/2022 12:21 AM     No results found for: LITHIUM  No results found for: VALPROATE, CBMZ    RISK ASSESSMENT: Moderate risk of agitation    Capacity Evaluation-the patient is able to give me no information about her health problems. She is delirious and disoriented. She is also labile in mood. The patient lacks the capacity to refuse medical care at this time   she would benefit from a surrogate decision maker    Treatment Plan:  Reviewed current Medications with the patient. Zydis increased to 10mg qhs. Risks, benefits, side effects, drug-to-drug interactions and alternatives to treatment were discussed. The patient  consented to treatment. Encourage patient to attend group and other milieu activities. Discharge planning discussed with the patient and treatment team.    PSYCHOTHERAPY/COUNSELING:  [] Therapeutic interview  [x] Supportive  [] CBT  [] Ongoing  [] Other                                              Alejandro Carter is a 56 y.o. female being evaluated face to face.     --Shimon Lagunas MD on 9/19/2022 at 8:03 PM    An electronic signature was used to authenticate this note. **This report has been created using voice recognition software. It may contain minor errors which are inherent in voice recognition technology. **

## 2022-09-21 LAB
GLUCOSE BLD-MCNC: 141 MG/DL (ref 65–105)
GLUCOSE BLD-MCNC: 189 MG/DL (ref 65–105)
GLUCOSE BLD-MCNC: 195 MG/DL (ref 65–105)
SURGICAL PATHOLOGY REPORT: NORMAL

## 2022-09-21 PROCEDURE — 6370000000 HC RX 637 (ALT 250 FOR IP): Performed by: PSYCHIATRY & NEUROLOGY

## 2022-09-21 PROCEDURE — 94640 AIRWAY INHALATION TREATMENT: CPT

## 2022-09-21 PROCEDURE — 6370000000 HC RX 637 (ALT 250 FOR IP): Performed by: INTERNAL MEDICINE

## 2022-09-21 PROCEDURE — 1200000000 HC SEMI PRIVATE

## 2022-09-21 PROCEDURE — 99232 SBSQ HOSP IP/OBS MODERATE 35: CPT | Performed by: PSYCHIATRY & NEUROLOGY

## 2022-09-21 PROCEDURE — 94761 N-INVAS EAR/PLS OXIMETRY MLT: CPT

## 2022-09-21 PROCEDURE — 6370000000 HC RX 637 (ALT 250 FOR IP): Performed by: STUDENT IN AN ORGANIZED HEALTH CARE EDUCATION/TRAINING PROGRAM

## 2022-09-21 PROCEDURE — 99232 SBSQ HOSP IP/OBS MODERATE 35: CPT | Performed by: INTERNAL MEDICINE

## 2022-09-21 PROCEDURE — 82947 ASSAY GLUCOSE BLOOD QUANT: CPT

## 2022-09-21 RX ORDER — OLANZAPINE 15 MG/1
15 TABLET, ORALLY DISINTEGRATING ORAL NIGHTLY
Status: DISCONTINUED | OUTPATIENT
Start: 2022-09-21 | End: 2022-09-22 | Stop reason: HOSPADM

## 2022-09-21 RX ADMIN — Medication 2 PUFF: at 07:51

## 2022-09-21 RX ADMIN — METFORMIN HYDROCHLORIDE 1000 MG: 1000 TABLET ORAL at 08:26

## 2022-09-21 RX ADMIN — INSULIN GLARGINE 15 UNITS: 100 INJECTION, SOLUTION SUBCUTANEOUS at 21:10

## 2022-09-21 RX ADMIN — Medication 2 PUFF: at 19:38

## 2022-09-21 RX ADMIN — FOLIC ACID 1 MG: 1 TABLET ORAL at 08:26

## 2022-09-21 RX ADMIN — PANTOPRAZOLE SODIUM 40 MG: 40 TABLET, DELAYED RELEASE ORAL at 08:26

## 2022-09-21 RX ADMIN — OLANZAPINE 15 MG: 15 TABLET, ORALLY DISINTEGRATING ORAL at 21:11

## 2022-09-21 RX ADMIN — PREGABALIN 75 MG: 75 CAPSULE ORAL at 08:26

## 2022-09-21 RX ADMIN — TRAZODONE HYDROCHLORIDE 150 MG: 100 TABLET ORAL at 21:10

## 2022-09-21 RX ADMIN — MIRTAZAPINE 7.5 MG: 15 TABLET, FILM COATED ORAL at 21:09

## 2022-09-21 RX ADMIN — METFORMIN HYDROCHLORIDE 1000 MG: 1000 TABLET ORAL at 17:35

## 2022-09-21 RX ADMIN — PANTOPRAZOLE SODIUM 40 MG: 40 TABLET, DELAYED RELEASE ORAL at 21:10

## 2022-09-21 RX ADMIN — INSULIN GLARGINE 15 UNITS: 100 INJECTION, SOLUTION SUBCUTANEOUS at 08:27

## 2022-09-21 RX ADMIN — THIAMINE HCL TAB 100 MG 100 MG: 100 TAB at 08:26

## 2022-09-21 RX ADMIN — PREGABALIN 75 MG: 75 CAPSULE ORAL at 21:10

## 2022-09-21 RX ADMIN — ATORVASTATIN CALCIUM 40 MG: 40 TABLET, FILM COATED ORAL at 21:10

## 2022-09-21 RX ADMIN — VENLAFAXINE HYDROCHLORIDE 150 MG: 150 CAPSULE, EXTENDED RELEASE ORAL at 08:26

## 2022-09-21 ASSESSMENT — PAIN SCALES - GENERAL: PAINLEVEL_OUTOF10: 0

## 2022-09-21 NOTE — CARE COORDINATION
CLAYTON has Guardianship paperwork which was completed and signed by Dr. Caitlyn Peguero. CLAYTON will place this paperwork in patient's chart in her room. CLAYTON attempted to reach patient's daughter Johny Cosmeing [phone] to discuss guardianship with her, and see if she has decided to move forward with process or not. Ananya did not answer, CLAYTON left a message requesting a call in return. Electronically signed by Patricia Moy on 9/21/22 at 12:27 PM EDT     Guardianship paperwork is completed and placed in patients chart.      Electronically signed by Patricia Moy on 9/21/22 at 12:50 PM EDT

## 2022-09-21 NOTE — PROGRESS NOTES
09/21/22 1351   Encounter Summary   Encounter Overview/Reason  Spiritual/Emotional Needs   Service Provided For: Patient   Last Encounter  09/21/22   Complexity of Encounter Low   Spiritual/Emotional needs   Type Spiritual Support   Assessment/Intervention/Outcome   Assessment Compromised coping   Intervention Active listening;Prayer (assurance of)/[place];Sustaining Presence/Min[employer] of presence   Outcome Engaged in conversation;Receptive

## 2022-09-21 NOTE — CARE COORDINATION
CLAYTON contacted [employer] [address] to obtain some information on the guardianship process as the daughter requested. CLAYTON spoke with Yojana Mosqueda who handles all guardianship processes. Yojana Mosqueda advised CLAYTON that the general court fee's for guardianship range from $022-$910 , This does not include any  fee's. Yojana Mosqueda stated that there is a program that patient's daughter could apply for to get this fee waived if she meets the criteria. Yojana Mosqueda stated that the whole process takes about a month on average. There is a way to have this expedited, but there were need to be an emergency reason on behalf of the patient. Yojana Mosqueda stated that The patient's daughter can call her directly to begin the process and/or ask any questions she has. Yojana Mosqueda provided SW with her direct phone number to provide to patients daughter. Yojana Mosqueda: [phone]. CLAYTON will contact patient's daughter today to follow up and provide her with this information. Dr. Kerry Sandoval responded to CLAYTON's Perfect serve from yesterday and stated that patient would benefit from a guardian, but the paperwork would have to be completed by the primary.      Electronically signed by Nirali Weeks on 22 at 9:29 AM EDT

## 2022-09-21 NOTE — PROGRESS NOTES
[address]    PROGRESS NOTE             9/21/2022    9:07 AM    Name:   Aruna Barrientos  MRN:     [id number]     Acct:      [de-identified]   Room:   2047/2047-01  IP Day:  15  Admit Date:  9/8/2022 11:01 AM    PCP:  Meghan Higginbotham MD  Code Status:  Full Code    Subjective:     C/C:   Chief Complaint   Patient presents with    Hyperglycemia    Altered Mental Status     Interval History Status: improved. Patient was seen and evaluated at bedside. S/p EGD   Refusing peripheral line   CL dced       Brief History:     The patient is a 56 y.o. Non- / non  female with a history of asthma, bipolar 1 disorder, hypertension, stroke, polysubstance abuse, DM 2, degenerative disc disease, and depression with psychotic features, who presented to the ED with altered mental status. The patient was found unresponsive at her home where she lives alone. The patient had not been taking her insulin as her refrigerator was filled with unused insulin vials. The patients family had not heard from the patient for several days and EMS was called. The patient was found confused and obtunded, hypotensive, and hyperglycemic. The ED, the patient was hypotensive (BP 86/64) and tachypneic (RR 24). Blood glucose was 1,034. Serum potassium was 5.5, bicarbonate was 19 mmol/L [anion gap 25 mmol/L). Cr was 3.56 (baseline Cr 0.73). Levophed was administered. The patient was admitted for management of DKA and DKA protocol was initiated    Review of Systems:     Reason unable to perform ROS: Pt is uncooperative      Medications: Allergies:     Allergies   Allergen Reactions    Bactrim [Sulfamethoxazole-Trimethoprim] Swelling    Adhesive Tape Rash       Current Meds:   Scheduled Meds:    pantoprazole  40 mg Oral BID    insulin glargine  15 Units SubCUTAneous BID    OLANZapine zydis  10 mg Oral Nightly    pregabalin  75 mg Oral BID    thiamine mononitrate  100 mg Oral Daily    folic acid  1 mg Oral Daily    insulin lispro  0-4 Units SubCUTAneous TID     insulin lispro  0-4 Units SubCUTAneous Nightly    atorvastatin  40 mg Oral Nightly    budesonide-formoterol  2 puff Inhalation BID    metFORMIN  1,000 mg Oral BID     mirtazapine  7.5 mg Oral Nightly    traZODone  150 mg Oral Nightly    venlafaxine  150 mg Oral Daily with breakfast    fluticasone  1 spray Each Nostril Daily     Continuous Infusions:    sodium chloride      sodium chloride Stopped (09/16/22 1833)    dextrose       PRN Meds: sodium chloride, haloperidol, potassium chloride, magnesium sulfate, acetaminophen, glucose, dextrose bolus **OR** dextrose bolus, dextrose, glucagon (rDNA), albuterol sulfate HFA, sodium phosphate IVPB **OR** sodium phosphate IVPB **OR** sodium phosphate IVPB, polyethylene glycol    Data:     Past Medical History:   has a past medical history of Acid reflux, Acute cystitis with hematuria, Acute non-recurrent maxillary sinusitis, Asthma, Bipolar 1 disorder ([address] Bipolar disorder, mixed ([address] BMI 34.0-34.9,adult, Cannabis use disorder, severe, dependence ([address] Cerebrovascular accident (CVA) ([address] Chest pain, Chronic renal insufficiency, Cocaine abuse ([address] COVID-19 virus RNA test result unknown, DDD (degenerative disc disease), cervical, Diabetes mellitus ([address] Dizziness, Fibromyalgia, History of stroke, Homicidal ideation, Hyperglycemia, Hypertension, Hypotension, IDDM (insulin dependent diabetes mellitus), Lupus ([address] Migraine, Neuropathy, Neuropathy, Polysubstance abuse ([address] Post traumatic stress disorder (PTSD), Posttraumatic stress disorder, Recurrent depression ([address] Recurrent major depressive disorder, in partial remission ([address] Screening mammogram, encounter for, Severe recurrent major depression with psychotic features ([address] Severe recurrent major depression without psychotic features ([address] Stroke (cerebrum) ([address] Stroke ([address] Suicidal ideation, Suicidal intent, Vitamin D deficiency, and White matter changes. Social History:   reports that she has never smoked. She has never used smokeless tobacco. She reports that she does not currently use alcohol. She reports current drug use. Drugs: Marijuana (Weed) and Cocaine. Family History:   Family History   Problem Relation Age of Onset    Diabetes Mother     Stroke Mother     Diabetes Father     Diabetes Sister     Diabetes Brother     Other Son         GSW    No Known Problems Sister        Vitals:  /62   Pulse 80   Temp 98.4 °F (36.9 °C)   Resp 18   Ht 5' 5\" (1.651 m)   Wt 207 lb 10.8 oz (94.2 kg)   LMP  (LMP Unknown)   SpO2 98%   PF (!) 15 L/min   BMI 34.56 kg/m²   No data recorded. Recent Labs     09/20/22  0520 09/20/22  1124 09/20/22  1613 09/21/22  0815   POCGLU 79 277* 195* 141*         I/O(24Hr):     Intake/Output Summary (Last 24 hours) at 9/21/2022 9720  Last data filed at 9/21/2022 0815  Gross per 24 hour   Intake 300 ml   Output --   Net 300 ml         Labs:    [unfilled]    Lab Results   Component Value Date/Time    SPECIAL NOT REPORTED 01/27/2022 12:47 PM     Lab Results   Component Value Date/Time    CULTURE NO GROWTH 5 DAYS 08/03/2022 12:18 PM       [unfilled]    Radiology:    ECHO Complete 2D W Doppler W Color    Result Date: 9/10/2022  1604 [facility] Transthoracic Echocardiography Report (TTE)  Patient Name José Reagan    Date of Study               09/09/2022               [facility]   Date of      1965  Gender                      Female  Birth   Age          56 year(s)  Race                        Black   Room Number  2007        Height:                     65 inch, 165.1 cm   Corporate ID [id number]    Weight:                     180 pounds, 81.6 kg  #   Patient Acct [de-identified]   BSA:          1.89 m^2      BMI:      29.95  #                                                              kg/m^2   MR #         [id number]      Sonographer Carina Newsome   Accession #  [id number]  Interpreting Physician      [address]   Fellow                   Referring Nurse                           Practitioner   Interpreting             Referring Physician  Fellow  Type of Study   TTE procedure:2D Echocardiogram, M-Mode, Doppler, Color Doppler. Procedure Date Date: 09/09/2022 Start: 12:50 PM Study Location: [facility] Technical Quality: Fair visualization Indications:Tachycardia. Patient Status: Inpatient Height: 65 inches Weight: 180 pounds BSA: 1.89 m^2 BMI: 29.95 kg/m^2 Rhythm: Sinus tachycardia HR: 108 bpm BP: 98/55 mmHg CONCLUSIONS Summary Left ventricle is normal in size and wall thickness. Global left ventricular systolic function is normal. Estimated LV EF 60-65 %. No obvious wall motion abnormality seen. Left atrium is normal in size. No significant valvular regurgitation or stenosis seen. Signature ----------------------------------------------------------------------------  Electronically signed by Carina Newsome(Sonographer) on 09/09/2022 02:20  PM ---------------------------------------------------------------------------- ----------------------------------------------------------------------------  Electronically signed by Suleiman Ponce(Interpreting physician) on 09/10/2022  08:12 AM ---------------------------------------------------------------------------- FINDINGS Left Atrium Left atrium is normal in size. Left Ventricle Left ventricle is normal in size and wall thickness. Global left ventricular systolic function is normal. Estimated LV EF 60-65 %. No obvious wall motion abnormality seen. Right Atrium Right atrium is normal in size. Right Ventricle Normal right ventricular size and function. Mitral Valve No obvious valvular abnormality seen. No evidence of mitral regurgitation. Aortic Valve No obvious valvular abnormality seen. No evidence of aortic insufficiency or stenosis.  Tricuspid Valve Tricuspid valve was not well visualized. Insignificant tricuspid regurgitation, unable to estimate RVSP. Pulmonic Valve Pulmonic valve was not well visualized. No evidence of pulmonic insufficiency or stenosis. Pericardial Effusion No significant pericardial effusion is seen. Pleural Effusion No pleural effusion seen. Miscellaneous Normal aortic root dimension. IVC not well visualized. E/E' average = 8.5. M-mode / 2D Measurements & Calculations:   LVIDd:3.79 cm(3.7 - 5.6 cm)      Diastolic BTADOT:39.4 ml  QJEQR:1.56 cm(2.2 - 4.0 cm)      Systolic ZCXHBN:6.74 ml  PLLD:6.88 cm(0.6 - 1.1 cm)       Aortic Root:3.6 cm(2.0 - 3.7 cm)  LVPWd:0.7 cm(0.6 - 1.1 cm)       LA Dimension: 2.3 cm(1.9 - 4.0 cm)  Fractional Shortenin.55 %    LA volume/Index: 33.7 ml /18m^2  Calculated LVEF (%): 86.38 %     LVOT:1.7 cm   Mitral:                              Aortic   Peak E-Wave: 0.75 m/s                Peak Velocity: 1.51 m/s  Peak A-Wave: 1.15 m/s                Mean Velocity: 0.86 m/s  E/A Ratio: 0.65                      Peak Gradient: 9.12 mmHg  Peak Gradient: 2.26 mmHg             Mean Gradient: 4 mmHg  Deceleration Time: 130 msec                                        Area (continuity): 2.26 cm^2                                       AV VTI: 20.6 cm  Septal Wall E' velocity:0.07 m/s Lateral Wall E' velocity:0.12 m/s    XR ABDOMEN (KUB) (SINGLE AP VIEW)    Result Date: 2022  EXAMINATION: ONE SUPINE XRAY VIEW(S) OF THE ABDOMEN 2022 9:33 am COMPARISON: 2022, 2022 HISTORY: ORDERING SYSTEM PROVIDED HISTORY: NGT placement, dark liquid coming through NG TECHNOLOGIST PROVIDED HISTORY: NGT placement, dark liquid coming through NG Reason for Exam: NGT placement, dark liquid coming through NG FINDINGS: Enteric tube coiled in the body of the stomach. Nonobstructive bowel gas pattern. Mild stool burden. Multiple external devices project over the patient. No acute osseous abnormality identified.      Enteric tube coiled at the level of the body of the stomach. Nonobstructive bowel gas pattern. CT HEAD WO CONTRAST    Result Date: 9/8/2022  EXAMINATION: CT OF THE HEAD WITHOUT CONTRAST  9/8/2022 11:38 am TECHNIQUE: CT of the head was performed without the administration of intravenous contrast. Automated exposure control, iterative reconstruction, and/or weight based adjustment of the mA/kV was utilized to reduce the radiation dose to as low as reasonably achievable. COMPARISON: MRI 08/04/2022, 08/03/2022 and CT 08/03/2022. HISTORY: ORDERING SYSTEM PROVIDED HISTORY: Mental Status Changes TECHNOLOGIST PROVIDED HISTORY: Mental Status Changes Decision Support Exception - unselect if not a suspected or confirmed emergency medical condition->Emergency Medical Condition (MA) Is the patient pregnant?->No Reason for Exam: AMS Additional signs and symptoms: AMS FINDINGS: BRAIN/VENTRICLES: There is no acute intracranial hemorrhage, mass effect or midline shift. No abnormal extra-axial fluid collection. Encephalomalacia in the left frontal lobe demonstrated corresponding to recently demonstrated ischemia. Cortical atrophy and chronic white matter changes in the brain and associated ventricular enlargement are again demonstrated. ORBITS: The visualized portion of the orbits demonstrate no acute abnormality. SINUSES: The visualized paranasal sinuses and mastoid air cells demonstrate no acute abnormality. SOFT TISSUES/SKULL:  No acute abnormality of the visualized skull or soft tissues. Chronic findings in the brain without acute CT abnormality identified. Expected evolution of recently demonstrated ischemia in the left frontal lobe. US LIVER    Result Date: 9/10/2022  EXAMINATION: RIGHT UPPER QUADRANT ULTRASOUND 9/10/2022 11:21 am COMPARISON: None.  HISTORY: ORDERING SYSTEM PROVIDED HISTORY: elevated lft TECHNOLOGIST PROVIDED HISTORY: elevated lft FINDINGS: LIVER:  The liver demonstrates normal echogenicity without evidence of intrahepatic biliary ductal dilatation. Hepatopetal flow portal vein. Liver 18.3 cm in length. BILIARY SYSTEM:  Gallstones in the gallbladder. Negative sonographic Gilliland's sign. Mild wall thickening at 4.6 mm. No pericholecystic fluid. Common bile duct is within normal limits measuring 6.8 mm. RIGHT KIDNEY: The right kidney is grossly unremarkable without evidence of hydronephrosis. PANCREAS:  Visualized portions of the pancreas are unremarkable. OTHER: No evidence of right upper quadrant ascites. Unremarkable ultrasound the liver. Cholelithiasis with mild gallbladder wall thickening. Negative sonographic Gilliland's sign. No pericholecystic fluid. Common duct upper normal at 6.8 mm. No intraductal stone demonstrated. Correlation clinical findings and laboratory values required. XR CHEST PORTABLE    Result Date: 9/8/2022  EXAMINATION: ONE XRAY VIEW OF THE CHEST 9/8/2022 7:53 pm COMPARISON: 08/17/2022 HISTORY: ORDERING SYSTEM PROVIDED HISTORY: line placement TECHNOLOGIST PROVIDED HISTORY: line placement Reason for Exam: Line placement FINDINGS: Central venous catheter tip overlies the right atrium. No pleural effusion or pneumothorax. Heart size is at the upper limits of normal.     Right central venous catheter tip overlies the right atrium. No pneumothorax. XR CHEST PORTABLE    Result Date: 8/17/2022  EXAMINATION: ONE XRAY VIEW OF THE CHEST 8/17/2022 8:09 pm COMPARISON: None. HISTORY: ORDERING SYSTEM PROVIDED HISTORY: chest pain TECHNOLOGIST PROVIDED HISTORY: chest pain FINDINGS: Chest radiograph: The cardiomediastinal silhouette and hilar contours are normal. The lungs are clear with no focal consolidation, pleural effusion or pneumothorax. The overlying soft tissue and osseous structures do not demonstrate acute abnormality. No acute intrathoracic pathology.      US RETROPERITONEAL COMPLETE    Result Date: 9/8/2022  EXAMINATION: RETROPERITONEAL ULTRASOUND OF THE KIDNEYS AND URINARY BLADDER 9/8/2022 COMPARISON: None HISTORY: ORDERING SYSTEM PROVIDED HISTORY: Acute kidney injury TECHNOLOGIST PROVIDED HISTORY: Acute kidney injury 82-year-old female with acute kidney injury FINDINGS: Kidneys: Right kidney measures 10.4 x 4.3 x 5.0 cm. Right renal cortical thickness measures 1.7 cm. Left kidney measures 9.4 x 4.8 x 4.8 cm. Left renal cortical thickness measures 1.8 cm. No hydronephrosis or perinephric fluid. No echogenic foci with posterior acoustic shadowing to suggest renal calculi. Gross preservation of the bilateral corticomedullary differentiation. Bladder: Not imaged. Unremarkable renal ultrasound. No hydronephrosis. Physical Examination:        Constitutional:       Appearance: She is ill-appearing. HENT:      Head: Normocephalic and atraumatic. Nose: No congestion or rhinorrhea. Eyes:      Extraocular Movements: Extraocular movements intact. Conjunctiva/sclera: Conjunctivae normal.      Pupils: Pupils are equal, round, and reactive to light. Cardiovascular:      Rate and Rhythm: Tachycardia present. Pulses: Normal pulses. Heart sounds: Normal heart sounds. No murmur heard. No friction rub. No gallop. Pulmonary:      Effort: Pulmonary effort is normal. No respiratory distress. Breath sounds: Normal breath sounds. No wheezing or rales. Abdominal:      General: Abdomen is flat. Bowel sounds are normal. There is no distension. Palpations: There is no mass. Tenderness: There is no guarding. Musculoskeletal:      Right lower leg: No edema. Left lower leg: No edema. Skin:     Capillary Refill: Capillary refill takes less than 2 seconds. Neurological:      General: No focal deficit present. Mental Status: She is alert but confused and disoriented. Sensory: No sensory deficit. Motor: No weakness.    Psychiatric: was not accessed due to altered mental status      Assessment:        Primary Problem  DKA, type 1, not at goal [facility])    C/A[address] Problems    Diagnosis Date Noted    Diabetic ketoacidosis with coma associated with type 1 diabetes mellitus ([facility] [E10.11] 09/13/2022     Priority: Medium    Coffee ground emesis [K92.0] 09/11/2022     Priority: Medium    Elevated LFTs [R79.89] 09/11/2022     Priority: Medium    Unspecified mood (affective) disorder ([facility] Robert Brazen 09/10/2022     Priority: Medium    Uremic encephalopathy [G93.49, N19] 09/09/2022     Priority: Medium    DKA, type 1, not at goal [facility]) [E10.10] 09/08/2022     Priority: Medium    DKA, type 2, not at goal [facility]) [E11.10] 09/08/2022     Priority: Medium    Acute GI bleeding [K92.2] 10/19/2018    Cocaine abuse ([facility] [F14.10] 01/05/2018       Plan:        DKA, 2/2 poor insulin compliance, resolving  - Glucose on admssion: 1,034; glucose today 80  - BMP q4h, initial Mag and Phos levels  - Glucose checks 4 times daily  - Home Lantus 30 units BID   - Home metformin on hold  -IV Normal Saline at 250 mL/hr  -Hypoglycemia, phos and potassium replacement protocols in place   -General surgery placed central line      Acute Kidney Injury pre-renal azotemia secondary to dehydration  -Baseline Cr 0.73;  Cr on admission: 3.56; Cr today 1.00  -Change IV NS infusion to 0.45 NS at 150 ml/hr  -BMP q4 hours  -monitor urine output  -Nephrology consulted      Suspicion for upper GI bleeding  -Coffee-ground emesis from his NG tube, eventually become bilious  -Globin had been steadily climbing since admission, currently is stable around 8  -GI on board  -PPI started  -Upper endoscopy is planned once electrolyte abnormalities resolve     Hypotension secondary to dehydration by osmotic diuresis  -BP today:113/61  -Free water deficit 10 L  -Continue NS infusion     Hypernatremia  -most recent Na+ : 147;   -Continue IV NS infusion to 0.45 NS at 150 ml/hr      Acute metabolic encephalopathy s/s to electrolyte abnormality  -Restraints in place; patient oriented to person only  -Nephrology on board; electrolyte replacement  -EEG ordered    -Restraints placed     Polysubstance abuse  -Urine tox positive for Fentanyl and cocaine  -Psychiatry consulted      Ventricular tachycardia  -Non-sustained VT secondary to electrolye imbalance, metabolic derangement and cocaine  -Continue to manage hyperglycemia and electrolytes  -Discontinue metoprolol  -Echo: Estimated LV EF 60-65        Depression with psychotic features  -Continue Haloperidol lactate 5 mg IM q 6 hours PRN  -Continue Mirtazapine 7.5 mg po nightly  -Continue Trazodone 150 mg po nightly  -Continue Venlafaxine 150 mg po daily  -Psychiatry consulted     Asthma  -Continue home Proventil ventolin 2 puff q 4 hours as needed   -Continue home Symbicort 2 puffs BID  -Continue home Flonase 1 spray each nostril daily      Code: Full code  Diet: Adult diet, 3 carbs  DVT prophylaxis: on hold        Sept 20  Feeling much better  GI seen her, EGD done and showed duodenal ulcers, on Protonix, refused tmost treatement  Discharge plan to [facility] not a candidate for [facility] per psychiatrist  Acute on chronic anemia with some coffee-ground vomit but no significant drop  Hb 7.6  IV Venofer 1 dose  400 mg  Psych seen the patient and deemed patient has not noncompetent   Recheck labs CBC, BMP,  Uncontrolled diabetes increase Lantus to 15 twice a day    Right IJ central line and López's catheter has been removed  Pending discharge to [facility]      Reece Naik MD  9/21/2022  9:07 AM

## 2022-09-21 NOTE — CONSULTS
BEHAVIORAL HEALTH FOLLOW-UP NOTE     9/21/2022     Patient was seen and examined in person, Chart reviewed   Patient's case discussed with staff/team    Chief Complaint: Altered mental status and bipolar disorder    Interim History:        The patient was seen at bedside. The patient reports little to no sleep at nighttime. The patient reports that she had a cavity in her chest which went all the way to her brain. She describes this as a reason for her admission. She believes her cavity has gotten better but does not believe it has completely gone away. The patient's explanation seems psychotic. The patient also appeared to be confabulating. The patient was asked about her history of drinking. She admits that she drank a lot. The patient is oriented to situation, self and person but has not oriented to time or place. The patient is discharge focused. She is able to dress she will call. She believes she can take care of herself. The patient denies any suicidal thoughts.     /72   Pulse 100   Temp 98.8 °F (37.1 °C) (Oral)   Resp 18   Ht 5' 5\" (1.651 m)   Wt 207 lb 10.8 oz (94.2 kg)   LMP  (LMP Unknown)   SpO2 97%   PF (!) 15 L/min   BMI 34.56 kg/m²        Energy:    [x] Normal/Unchanged  [] Increased  [] Decreased        Aggression:  [] yes  [x] no       PAST MEDICAL/PSYCHIATRIC HISTORY:   Past Medical History:   Diagnosis Date    Acid reflux 5/29/2017    Acute cystitis with hematuria 10/11/2016    Acute non-recurrent maxillary sinusitis 11/28/2017    Asthma     Bipolar 1 disorder ([address] 1/4/2018    Bipolar disorder, mixed ([address]     BMI 34.0-34.9,adult 11/28/2017    Cannabis use disorder, severe, dependence ([address] 12/19/2017    Cerebrovascular accident (CVA) ([address] 6/14/2017    Chest pain 11/5/2016    Chronic renal insufficiency     Cocaine abuse ([address] 1/5/2018    COVID-19 virus RNA test result unknown     DDD (degenerative disc disease), cervical     Diabetes mellitus ([address]     Dizziness 6/13/2017    Fibromyalgia     History of stroke 9/9/2017    Homicidal ideation 11/6/2017    Hyperglycemia     Hypertension     Hypotension 1/18/2019    IDDM (insulin dependent diabetes mellitus) 12/21/2015    Lupus ([address]     Migraine     Neuropathy     Neuropathy     Polysubstance abuse ([address] 9/17/2017    Post traumatic stress disorder (PTSD)     Posttraumatic stress disorder 5/29/2017    Recurrent depression ([address] 5/29/2017    Recurrent major depressive disorder, in partial remission ([address] 11/28/2017    Screening mammogram, encounter for 11/28/2017    Severe recurrent major depression with psychotic features ([address] 12/19/2017    Severe recurrent major depression without psychotic features ([address] 12/19/2017    Stroke (cerebrum) ([address] 6/14/2017    St[address] [facility]     per chart notes    Suicidal ideation     Suicidal intent 3/10/2017    Vitamin D deficiency 11/28/2017    White matter changes 6/13/2017       FAMILY/SOCIAL HISTORY:  Family History   Problem Relation Age of Onset    Diabetes Mother     Stroke Mother     Diabetes Father     Diabetes Sister     Diabetes Brother     Other Son         GSW    No Known Problems Sister      Social History     Socioeconomic History    Marital status: Legally      Spouse name: Not on file    Number of children: Not on file    Years of education: Not on file    Highest education level: Not on file   Occupational History    Not on file   Tobacco Use    Smoking status: Never    Smokeless tobacco: Never   Vaping Use    Vaping Use: Never used   Substance and Sexual Activity    Alcohol use: Not Currently    Drug use: Yes     Types: Marijuana (Epifanio Hotter), Cocaine     Comment: + Cocaine 2/2021 also, see Care Everywhere UDS    Sexual activity: Not Currently     Partners: Male   Other Topics Concern    Not on file   Social History Narrative    Not on file     Social Determinants of Health     Financial Resource Strain: Not on file   Food Insecurity: Not on file   Transportation Needs: Not on file   Physical Activity: Not on file   Stress: Not on file   Social Connections: Unknown    Frequency of Communication with Friends and Family: Once a week    Frequency of Social Gatherings with Friends and Family: Not on file    Attends Oriental orthodox Services: Not on file    Active Member of Clubs or Organizations: No    Attends Club or Organization Meetings: Never    Marital Status:    Intimate Partner Violence: Not on file   Housing Stability: Not on file           ROS:  [x] All negative/unchanged except if checked.  Explain positive(checked items) below:  [] Constitutional  [] Eyes  [] Ear/Nose/Mouth/Throat  [] Respiratory  [] CV  [] GI  []   [] Musculoskeletal  [] Skin/Breast  [] Neurological  [] Endocrine  [] Heme/Lymph  [] Allergic/Immunologic    Explanation:     MEDICATIONS:    Current Facility-Administered Medications:     OLANZapine zydis (ZYPREXA) disintegrating tablet 15 mg, 15 mg, Oral, Nightly, America Braswell MD    pantoprazole (PROTONIX) tablet 40 mg, 40 mg, Oral, BID, Bailey Caldwell MD, 40 mg at 09/21/22 0826    insulin glargine (LANTUS) injection vial 15 Units, 15 Units, SubCUTAneous, BID, Bailey Caldwell MD, 15 Units at 09/21/22 0827    pregabalin (LYRICA) capsule 75 mg, 75 mg, Oral, BID, Anais Johnston MD, 75 mg at 09/21/22 0826    0.9 % sodium chloride infusion, , IntraVENous, PRN, Taylor Thibodeaux MD    thiamine mononitrate tablet 100 mg, 100 mg, Oral, Daily, Taylor Thibodeaux MD, 100 mg at 07/31/02 9503    folic acid (FOLVITE) tablet 1 mg, 1 mg, Oral, Daily, Taylor Thibodeaux MD, 1 mg at 09/21/22 0826    haloperidol (HALDOL) tablet 1 mg, 1 mg, Oral, Q6H PRN, Taylor Thibodeaux MD, 1 mg at 09/20/22 1323    0.9 % sodium chloride infusion, , IntraVENous, Continuous, Taylor Thibodeaux MD, Stopped at 09/16/22 1833    potassium chloride 10 mEq/100 mL IVPB (Peripheral Line), 10 mEq, IntraVENous, PRN, Taylor Thibodeaux MD, Stopped at 09/13/22 1312    magnesium sulfate 1000 mg in dextrose 5% 100 mL IVPB, 15 mmol, IntraVENous, PRN, Stopped at 09/13/22 0125 **OR** sodium phosphate 20 mmol in dextrose 5 % 500 mL IVPB, 20 mmol, IntraVENous, PRN, Dom Allen MD    polyethylene glycol (GLYCOLAX) packet 17 g, 17 g, Oral, Daily PRN, Dom Allen MD      Examination:  /72   Pulse 100   Temp 98.8 °F (37.1 °C) (Oral)   Resp 18   Ht 5' 5\" (1.651 m)   Wt 207 lb 10.8 oz (94.2 kg)   LMP  (LMP Unknown)   SpO2 97%   PF (!) 15 L/min   BMI 34.56 kg/m²      Medication side effects(SE): none    Mental Status Examination:    Level of consciousness: Alert and awake  Appearance:  fair grooming and fair hygiene  Behavior/Motor:  no abnormalities noted  Attitude toward examiner:  cooperative  Speech:  slow and normal volume  Mood: Euthymic  Affect:  mood congruent  Thought processes:  loose associations. Thought content: Confabulating  Homicidal ideation - none  Suicidal Ideation:  denies suicidal ideation  Delusions:  no evidence of delusions  Perceptual Disturbance:  denies any perceptual disturbance  Cognition:  disoriented   Concentration distractible  Insight poor   Judgement poor     ASSESSMENT:   Patient symptoms are:  [] Well controlled  [x] Improving  [] Worsening  [] No change      Diagnosis: Delirium due to medical condition  Principal Problem:    DKA, type 1, not at goal [facility])  Active Problems:    DKA, type 2, not at goal [facility])    Uremic encephalopathy    Unspecified mood (affective) disorder (HCC)    Coffee ground emesis    Elevated LFTs    Diabetic ketoacidosis with coma associated with type 1 diabetes mellitus ([facility]    Cocaine abuse ([facility]    Acute GI bleeding  Resolved Problems:    * No resolved hospital problems. *      LABS:    No results for input(s): WBC, HGB, PLT in the last 72 hours. No results for input(s): NA, K, CL, CO2, BUN, CREATININE, GLUCOSE in the last 72 hours. No results for input(s): BILITOT, ALKPHOS, AST, ALT in the last 72 hours.     Lab Results   Component Value Date/Time [address]ik[address] NEGATIVE 09/08/2022 05:29 PM    LABBENZ NEGATIVE 09/08/2022 05:29 PM    LABMETH NEGATIVE 09/08/2022 05:29 PM    PPXUR NOT REPORTED 11/04/2021 03:43 PM     Lab Results   Component Value Date/Time    TSH 1.10 08/04/2022 12:21 AM     No results found for: LITHIUM  No results found for: VALPROATE, CBMZ    RISK ASSESSMENT: Moderate risk of agitation    Capacity Evaluation-the patient is able to give me no information about her health problems. She is delirious and disoriented. She is also labile in mood. The patient lacks the capacity to refuse medical care at this time   she would benefit from a surrogate decision maker/guardianship    Treatment Plan:  Reviewed current Medications with the patient. Zydis increased to 15mg qhs. Risks, benefits, side effects, drug-to-drug interactions and alternatives to treatment were discussed. The patient  consented to treatment. Encourage patient to attend group and other milieu activities. Discharge planning discussed with the patient and treatment team.    PSYCHOTHERAPY/COUNSELING:  [] Therapeutic interview  [x] Supportive  [] CBT  [] Ongoing  [] Other                                              Delgado Mahan is a 56 y.o. female being evaluated face to face.     --Annia Palmer MD on 9/21/2022 at 7:28 PM    An electronic signature was used to authenticate this note. **This report has been created using voice recognition software. It may contain minor errors which are inherent in voice recognition technology. **

## 2022-09-21 NOTE — PLAN OF CARE
Problem: Safety - Adult  Goal: Free from fall injury  9/21/2022 0844 by Radha Morgan RN  Outcome: Progressing  Pt fall risk, fall band present, falling star, safety alarm activated and in use as needed. Hourly rounding performed. Pt encouraged to use call light. See Jeanie Cabrera fall risk assessment.

## 2022-09-22 ENCOUNTER — HOSPITAL ENCOUNTER (INPATIENT)
Age: 57
LOS: 21 days | Discharge: NURSING, REHAB OR LONG-TERM CARE | DRG: 751 | End: 2022-10-13
Attending: PSYCHIATRY & NEUROLOGY | Admitting: PSYCHIATRY & NEUROLOGY
Payer: COMMERCIAL

## 2022-09-22 VITALS
WEIGHT: 207.67 LBS | BODY MASS INDEX: 34.6 KG/M2 | DIASTOLIC BLOOD PRESSURE: 60 MMHG | HEIGHT: 65 IN | TEMPERATURE: 97.5 F | OXYGEN SATURATION: 96 % | RESPIRATION RATE: 20 BRPM | HEART RATE: 89 BPM | SYSTOLIC BLOOD PRESSURE: 113 MMHG

## 2022-09-22 DIAGNOSIS — E66.9 DIABETES MELLITUS TYPE 2 IN OBESE (HCC): ICD-10-CM

## 2022-09-22 DIAGNOSIS — E78.49 OTHER HYPERLIPIDEMIA: ICD-10-CM

## 2022-09-22 DIAGNOSIS — E11.69 DIABETES MELLITUS TYPE 2 IN OBESE (HCC): ICD-10-CM

## 2022-09-22 PROBLEM — F29 UNSPECIFIED PSYCHOSIS NOT DUE TO A SUBSTANCE OR KNOWN PHYSIOLOGICAL CONDITION (HCC): Status: ACTIVE | Noted: 2022-09-22

## 2022-09-22 LAB
GLUCOSE BLD-MCNC: 135 MG/DL (ref 65–105)
GLUCOSE BLD-MCNC: 163 MG/DL (ref 65–105)

## 2022-09-22 PROCEDURE — 1240000000 HC EMOTIONAL WELLNESS R&B

## 2022-09-22 PROCEDURE — 6370000000 HC RX 637 (ALT 250 FOR IP): Performed by: INTERNAL MEDICINE

## 2022-09-22 PROCEDURE — 6370000000 HC RX 637 (ALT 250 FOR IP): Performed by: STUDENT IN AN ORGANIZED HEALTH CARE EDUCATION/TRAINING PROGRAM

## 2022-09-22 PROCEDURE — 82947 ASSAY GLUCOSE BLOOD QUANT: CPT

## 2022-09-22 PROCEDURE — 6370000000 HC RX 637 (ALT 250 FOR IP): Performed by: PSYCHIATRY & NEUROLOGY

## 2022-09-22 PROCEDURE — 94640 AIRWAY INHALATION TREATMENT: CPT

## 2022-09-22 PROCEDURE — 99232 SBSQ HOSP IP/OBS MODERATE 35: CPT | Performed by: PSYCHIATRY & NEUROLOGY

## 2022-09-22 PROCEDURE — 99239 HOSP IP/OBS DSCHRG MGMT >30: CPT | Performed by: INTERNAL MEDICINE

## 2022-09-22 RX ORDER — PANTOPRAZOLE SODIUM 40 MG/1
40 TABLET, DELAYED RELEASE ORAL 2 TIMES DAILY
Status: CANCELLED | OUTPATIENT
Start: 2022-09-22

## 2022-09-22 RX ORDER — PREGABALIN 75 MG/1
75 CAPSULE ORAL 2 TIMES DAILY
Status: DISCONTINUED | OUTPATIENT
Start: 2022-09-22 | End: 2022-10-13 | Stop reason: HOSPADM

## 2022-09-22 RX ORDER — TRAZODONE HYDROCHLORIDE 50 MG/1
50 TABLET ORAL NIGHTLY PRN
Status: DISCONTINUED | OUTPATIENT
Start: 2022-09-22 | End: 2022-10-13 | Stop reason: HOSPADM

## 2022-09-22 RX ORDER — IBUPROFEN 400 MG/1
400 TABLET ORAL EVERY 6 HOURS PRN
Status: DISCONTINUED | OUTPATIENT
Start: 2022-09-22 | End: 2022-10-13 | Stop reason: HOSPADM

## 2022-09-22 RX ORDER — INSULIN LISPRO 100 [IU]/ML
0-4 INJECTION, SOLUTION INTRAVENOUS; SUBCUTANEOUS NIGHTLY
Status: DISCONTINUED | OUTPATIENT
Start: 2022-09-22 | End: 2022-09-28

## 2022-09-22 RX ORDER — THIAMINE MONONITRATE (VIT B1) 100 MG
100 TABLET ORAL DAILY
Refills: 0 | OUTPATIENT
Start: 2022-09-22

## 2022-09-22 RX ORDER — ACETAMINOPHEN 325 MG/1
650 TABLET ORAL EVERY 6 HOURS PRN
Status: DISCONTINUED | OUTPATIENT
Start: 2022-09-22 | End: 2022-10-13 | Stop reason: HOSPADM

## 2022-09-22 RX ORDER — OLANZAPINE 15 MG/1
15 TABLET, ORALLY DISINTEGRATING ORAL NIGHTLY
Status: CANCELLED | OUTPATIENT
Start: 2022-09-22

## 2022-09-22 RX ORDER — INSULIN LISPRO 100 [IU]/ML
0-4 INJECTION, SOLUTION INTRAVENOUS; SUBCUTANEOUS
Status: DISCONTINUED | OUTPATIENT
Start: 2022-09-23 | End: 2022-09-28

## 2022-09-22 RX ORDER — PANTOPRAZOLE SODIUM 40 MG/1
40 TABLET, DELAYED RELEASE ORAL 2 TIMES DAILY
Status: DISCONTINUED | OUTPATIENT
Start: 2022-09-22 | End: 2022-10-13 | Stop reason: HOSPADM

## 2022-09-22 RX ORDER — MAGNESIUM HYDROXIDE/ALUMINUM HYDROXICE/SIMETHICONE 120; 1200; 1200 MG/30ML; MG/30ML; MG/30ML
30 SUSPENSION ORAL EVERY 6 HOURS PRN
Status: DISCONTINUED | OUTPATIENT
Start: 2022-09-22 | End: 2022-10-13 | Stop reason: HOSPADM

## 2022-09-22 RX ORDER — DIPHENHYDRAMINE HYDROCHLORIDE 50 MG/ML
50 INJECTION INTRAMUSCULAR; INTRAVENOUS EVERY 6 HOURS PRN
Status: DISCONTINUED | OUTPATIENT
Start: 2022-09-22 | End: 2022-10-13 | Stop reason: HOSPADM

## 2022-09-22 RX ORDER — OLANZAPINE 10 MG/1
15 TABLET, ORALLY DISINTEGRATING ORAL NIGHTLY
Status: DISCONTINUED | OUTPATIENT
Start: 2022-09-22 | End: 2022-10-13 | Stop reason: HOSPADM

## 2022-09-22 RX ORDER — HALOPERIDOL 5 MG/ML
5 INJECTION INTRAMUSCULAR EVERY 6 HOURS PRN
Status: DISCONTINUED | OUTPATIENT
Start: 2022-09-22 | End: 2022-10-13 | Stop reason: HOSPADM

## 2022-09-22 RX ORDER — PREGABALIN 75 MG/1
75 CAPSULE ORAL 2 TIMES DAILY
Status: CANCELLED | OUTPATIENT
Start: 2022-09-22

## 2022-09-22 RX ORDER — INSULIN GLARGINE 100 [IU]/ML
15 INJECTION, SOLUTION SUBCUTANEOUS 2 TIMES DAILY
Status: DISCONTINUED | OUTPATIENT
Start: 2022-09-22 | End: 2022-09-24

## 2022-09-22 RX ORDER — HYDROXYZINE 50 MG/1
50 TABLET, FILM COATED ORAL 3 TIMES DAILY PRN
Status: DISCONTINUED | OUTPATIENT
Start: 2022-09-22 | End: 2022-10-13 | Stop reason: HOSPADM

## 2022-09-22 RX ORDER — POLYETHYLENE GLYCOL 3350 17 G/17G
17 POWDER, FOR SOLUTION ORAL DAILY PRN
Status: DISCONTINUED | OUTPATIENT
Start: 2022-09-22 | End: 2022-10-13 | Stop reason: HOSPADM

## 2022-09-22 RX ORDER — LORAZEPAM 2 MG/ML
2 INJECTION INTRAMUSCULAR EVERY 6 HOURS PRN
Status: DISCONTINUED | OUTPATIENT
Start: 2022-09-22 | End: 2022-10-13 | Stop reason: HOSPADM

## 2022-09-22 RX ADMIN — THIAMINE HCL TAB 100 MG 100 MG: 100 TAB at 09:11

## 2022-09-22 RX ADMIN — INSULIN GLARGINE 15 UNITS: 100 INJECTION, SOLUTION SUBCUTANEOUS at 20:46

## 2022-09-22 RX ADMIN — PANTOPRAZOLE SODIUM 40 MG: 40 TABLET, DELAYED RELEASE ORAL at 09:12

## 2022-09-22 RX ADMIN — METFORMIN HYDROCHLORIDE 1000 MG: 1000 TABLET ORAL at 09:11

## 2022-09-22 RX ADMIN — PREGABALIN 75 MG: 75 CAPSULE ORAL at 20:46

## 2022-09-22 RX ADMIN — INSULIN GLARGINE 15 UNITS: 100 INJECTION, SOLUTION SUBCUTANEOUS at 09:12

## 2022-09-22 RX ADMIN — PREGABALIN 75 MG: 75 CAPSULE ORAL at 09:11

## 2022-09-22 RX ADMIN — Medication 2 PUFF: at 07:59

## 2022-09-22 RX ADMIN — VENLAFAXINE HYDROCHLORIDE 150 MG: 150 CAPSULE, EXTENDED RELEASE ORAL at 09:11

## 2022-09-22 RX ADMIN — TRAZODONE HYDROCHLORIDE 50 MG: 50 TABLET ORAL at 20:46

## 2022-09-22 RX ADMIN — PANTOPRAZOLE SODIUM 40 MG: 40 TABLET, DELAYED RELEASE ORAL at 20:46

## 2022-09-22 RX ADMIN — OLANZAPINE 15 MG: 10 TABLET, ORALLY DISINTEGRATING ORAL at 20:46

## 2022-09-22 RX ADMIN — FOLIC ACID 1 MG: 1 TABLET ORAL at 09:11

## 2022-09-22 ASSESSMENT — SLEEP AND FATIGUE QUESTIONNAIRES
AVERAGE NUMBER OF SLEEP HOURS: 7
DO YOU USE A SLEEP AID: NO
DO YOU HAVE DIFFICULTY SLEEPING: NO

## 2022-09-22 ASSESSMENT — LIFESTYLE VARIABLES
HOW MANY STANDARD DRINKS CONTAINING ALCOHOL DO YOU HAVE ON A TYPICAL DAY: PATIENT DECLINED
HOW OFTEN DO YOU HAVE A DRINK CONTAINING ALCOHOL: PATIENT DECLINED

## 2022-09-22 NOTE — PLAN OF CARE
Problem: Discharge Planning  Goal: Discharge to home or other facility with appropriate resources  Outcome: Progressing  Note: Discharge disposition yet  to be decided     Problem: Pain  Goal: Verbalizes/displays adequate comfort level or baseline comfort level  Outcome: Progressing  Note: Patient made no complaint of pain     Problem: Skin/Tissue Integrity  Goal: Absence of new skin breakdown  Description: 1. Monitor for areas of redness and/or skin breakdown  2. Assess vascular access sites hourly  3. Every 4-6 hours minimum:  Change oxygen saturation probe site  4. Every 4-6 hours:  If on nasal continuous positive airway pressure, respiratory therapy assess nares and determine need for appliance change or resting period.   Outcome: Progressing  Note: No new breakdown in skin integrity noted     Problem: ABCDS Injury Assessment  Goal: Absence of physical injury  Outcome: Progressing  Note: Patient remains free from falls and injury, video monitoring remains in progress     Problem: Safety - Adult  Goal: Free from fall injury  Outcome: Progressing  Note: Patient remains free from falls

## 2022-09-22 NOTE — PROGRESS NOTES
BEHAVIORAL HEALTH FOLLOW-UP NOTE     9/22/2022     Patient was seen and examined in person, Chart reviewed   Patient's case discussed with staff/team    Chief Complaint: Altered mental status and bipolar disorder    Interim History:        The patient was seen at bedside. The patient continues to have evidence of internal stimulation. She is confabulating. She has ongoing hallucinations. The patient is oriented to situation but not to date or place. The patient is irritable today. The patient is refusing to answer questions about depression and suicidal ideation. I have noted that the patient's hemoglobin has remained over 7.   Her electrolytes are within normal limits    /60   Pulse 89   Temp 97.5 °F (36.4 °C)   Resp 20   Ht 5' 5\" (1.651 m)   Wt 207 lb 10.8 oz (94.2 kg)   LMP  (LMP Unknown)   SpO2 96%   PF (!) 15 L/min   BMI 34.56 kg/m²        Energy:    [x] Normal/Unchanged  [] Increased  [] Decreased        Aggression:  [] yes  [x] no       PAST MEDICAL/PSYCHIATRIC HISTORY:   Past Medical History:   Diagnosis Date    Acid reflux 5/29/2017    Acute cystitis with hematuria 10/11/2016    Acute non-recurrent maxillary sinusitis 11/28/2017    Asthma     Bipolar 1 disorder ([address] 1/4/2018    Bipolar disorder, mixed ([address]     BMI 34.0-34.9,adult 11/28/2017    Cannabis use disorder, severe, dependence ([address] 12/19/2017    Cerebrovascular accident (CVA) ([address] 6/14/2017    Chest pain 11/5/2016    Chronic renal insufficiency     Cocaine abuse ([address] 1/5/2018    COVID-19 virus RNA test result unknown     DDD (degenerative disc disease), cervical     Diabetes mellitus ([address]     Dizziness 6/13/2017    Fibromyalgia     History of stroke 9/9/2017    Homicidal ideation 11/6/2017    Hyperglycemia     Hypertension     Hypotension 1/18/2019    IDDM (insulin dependent diabetes mellitus) 12/21/2015    Lupus ([address]     Migraine     Neuropathy     Neuropathy     Polysubstance abuse ([address] 9/17/2017    Post traumatic stress disorder (PTSD)     Posttraumatic stress disorder 5/29/2017    Recurrent depression ([facility] 5/29/2017    Recurrent major depressive disorder, in partial remission ([facility] 11/28/2017    Screening mammogram, encounter for 11/28/2017    Severe recurrent major depression with psychotic features ([facility] 12/19/2017    Severe recurrent major depression without psychotic features ([facility] 12/19/2017    Stroke (cerebrum) ([facility] 6/14/2017    Stroke ([facility]     per chart notes    Suicidal ideation     Suicidal intent 3/10/2017    Vitamin D deficiency 11/28/2017    White matter changes 6/13/2017       FAMILY/SOCIAL HISTORY:  Family History   Problem Relation Age of Onset    Diabetes Mother     Stroke Mother     Diabetes Father     Diabetes Sister     Diabetes Brother     Other Son         GSW    No Known Problems Sister      Social History     Socioeconomic History    Marital status: Legally      Spouse name: Not on file    Number of children: Not on file    Years of education: Not on file    Highest education level: Not on file   Occupational History    Not on file   Tobacco Use    Smoking status: Never    Smokeless tobacco: Never   Vaping Use    Vaping Use: Never used   Substance and Sexual Activity    Alcohol use: Not Currently    Drug use: Yes     Types: Marijuana (Emili Snowden), Cocaine     Comment: + Cocaine 2/2021 also, see Care Everywhere UDS    Sexual activity: Not Currently     Partners: Male   Other Topics Concern    Not on file   Social History Narrative    Not on file     Social Determinants of Health     Financial Resource Strain: Not on file   Food Insecurity: Not on file   Transportation Needs: Not on file   Physical Activity: Not on file   Stress: Not on file   Social Connections: Unknown    Frequency of Communication with Friends and Family: Once a week    Frequency of Social Gatherings with Friends and Family: Not on file    Attends Hinduism Services: Not on file    Active Member of Clubs or Organizations: No Attends Club or Organization Meetings: Never    Marital Status:    Intimate Partner Violence: Not on file   Housing Stability: Not on file           ROS:  [x] All negative/unchanged except if checked. Explain positive(checked items) below:  [] Constitutional  [] Eyes  [] Ear/Nose/Mouth/Throat  [] Respiratory  [] CV  [] GI  []   [] Musculoskeletal  [] Skin/Breast  [] Neurological  [] Endocrine  [] Heme/Lymph  [] Allergic/Immunologic    Explanation:     MEDICATIONS:  No current facility-administered medications for this encounter. No current outpatient medications on file. Examination:  /60   Pulse 89   Temp 97.5 °F (36.4 °C)   Resp 20   Ht 5' 5\" (1.651 m)   Wt 207 lb 10.8 oz (94.2 kg)   LMP  (LMP Unknown)   SpO2 96%   PF (!) 15 L/min   BMI 34.56 kg/m²      Medication side effects(SE): none    Mental Status Examination:    Level of consciousness: Alert and awake  Appearance:  fair grooming and fair hygiene  Behavior/Motor:  no abnormalities noted  Attitude toward examiner:  cooperative  Speech:  slow and normal volume  Mood: Euthymic  Affect:  mood congruent  Thought processes:  loose associations.      Thought content: Confabulating  Homicidal ideation - none  Suicidal Ideation:  denies suicidal ideation  Delusions:  no evidence of delusions  Perceptual Disturbance:  denies any perceptual disturbance  Cognition:  disoriented   Concentration distractible  Insight poor   Judgement poor     ASSESSMENT:   Patient symptoms are:  [] Well controlled  [x] Improving  [] Worsening  [] No change      Diagnosis: Psychosis unspecified  Principal Problem:    DKA, type 1, not at goal [facility])  Active Problems:    DKA, type 2, not at goal [facility])    Uremic encephalopathy    Unspecified mood (affective) disorder (HCC)    Coffee ground emesis    Elevated LFTs    Diabetic ketoacidosis with coma associated with type 1 diabetes mellitus ([facility]    Cocaine abuse ([facility]    Acute GI bleeding  Resolved Problems:    * No resolved hospital problems. *      LABS:    No results for input(s): WBC, HGB, PLT in the last 72 hours. No results for input(s): NA, K, CL, CO2, BUN, CREATININE, GLUCOSE in the last 72 hours. No results for input(s): BILITOT, ALKPHOS, AST, ALT in the last 72 hours. Lab Results   Component Value Date/Time    BARBSCNU NEGATIVE 09/08/2022 05:29 PM    LABBENZ NEGATIVE 09/08/2022 05:29 PM    LABMETH NEGATIVE 09/08/2022 05:29 PM    PPXUR NOT REPORTED 11/04/2021 03:43 PM     Lab Results   Component Value Date/Time    TSH 1.10 08/04/2022 12:21 AM     No results found for: LITHIUM  No results found for: VALPROATE, CBMZ    RISK ASSESSMENT: Moderate risk of agitation    Capacity Evaluation-the patient is able to give me no information about her health problems. She is delirious and disoriented. She is also labile in mood. The patient lacks the capacity to refuse medical care at this time   she would benefit from a surrogate decision maker/guardianship    Treatment Plan: Admit to psychiatry when medically cleared  Reviewed current Medications with the patient. Continue Zydis 15 mg nightly. Risks, benefits, side effects, drug-to-drug interactions and alternatives to treatment were discussed. The patient  consented to treatment. Encourage patient to attend group and other milieu activities. Discharge planning discussed with the patient and treatment team.    PSYCHOTHERAPY/COUNSELING:  [] Therapeutic interview  [x] Supportive  [] CBT  [] Ongoing  [] Other                                              Tara Hansen is a 56 y.o. female being evaluated face to face.     --Shirleyann Gowers, MD on 9/22/2022 at 4:26 PM    An electronic signature was used to authenticate this note. **This report has been created using voice recognition software. It may contain minor errors which are inherent in voice recognition technology. **

## 2022-09-22 NOTE — PROGRESS NOTES
Report called to Marco Mckeon at [facility]. They can come get patient in approx. Half hour. All questions answered. Cartilage Graft Text: The defect edges were debeveled with a #15 scalpel blade.  Given the location of the defect, shape of the defect, the fact the defect involved a full thickness cartilage defect a cartilage graft was deemed most appropriate.  An appropriate donor site was identified, cleansed, and anesthetized. The cartilage graft was then harvested and transferred to the recipient site, oriented appropriately and then sutured into place.  The secondary defect was then repaired using a primary closure.

## 2022-09-22 NOTE — CARE COORDINATION
CLAYTON received a call from Patient's Daughter, Soraida Garner. Ananya apologized for the late return call and stated that she works two jobs, so she has been busy. CLAYTON informed her that per notes in chart, it appears her mother will be transferring to [facility]. CLAYTON discussed guardianship further with Ananya. Soraida Garner stated that she is open to it but she wants to talk to the court to ask specific questions that she has before making the decision. CLAYTON provided her with the phone number for Felicia Ambriz, the direct guardianship liaison at [employer] [facility]. CLAYTON discussed with her that the process can be time consuming as well and encouraged her to take this into consideration as well as she works two jobs. She stated that she will contact them and make a decision. CLAYTON informed her that someone from [facility] should be in contact with her. She did not have any other questions at this time.      Electronically signed by Deena Hernandez on 9/22/22 at 2:14 PM EDT

## 2022-09-22 NOTE — PLAN OF CARE
Problem: Discharge Planning  Goal: Discharge to home or other facility with appropriate resources  9/22/2022 0925 by Yris Daly RN  Outcome: Progressing  Plans to pink slip patient to [facility] today.

## 2022-09-22 NOTE — BH NOTE
[facility]  Admission Note     Admission Type:   Admission Type: Involuntary (Pink Slipped on 9/22/22 at 11:25 am)    Reason for admission:  Reason for Admission: Poor insight, unable of making decisions on own at this time. Psychosis unspecified.       Addictive Behavior:   Addictive Behavior  In the Past 3 Months, Have You Felt or Has Someone Told You That You Have a Problem With  : None    Medical Problems:   Past Medical History:   Diagnosis Date    Acid reflux 5/29/2017    Acute cystitis with hematuria 10/11/2016    Acute non-recurrent maxillary sinusitis 11/28/2017    Asthma     Bipolar 1 disorder ([address] 1/4/2018    Bipolar disorder, mixed ([address]     BMI 34.0-34.9,adult 11/28/2017    Cannabis use disorder, severe, dependence ([address] 12/19/2017    Cerebrovascular accident (CVA) ([address] 6/14/2017    Chest pain 11/5/2016    Chronic renal insufficiency     Cocaine abuse ([address] 1/5/2018    COVID-19 virus RNA test result unknown     DDD (degenerative disc disease), cervical     Diabetes mellitus ([address]     Dizziness 6/13/2017    Fibromyalgia     History of stroke 9/9/2017    Homicidal ideation 11/6/2017    Hyperglycemia     Hypertension     Hypotension 1/18/2019    IDDM (insulin dependent diabetes mellitus) 12/21/2015    Lupus ([address]     Migraine     Neuropathy     Neuropathy     Polysubstance abuse ([address] 9/17/2017    Post traumatic stress disorder (PTSD)     Posttraumatic stress disorder 5/29/2017    Recurrent depression ([address] 5/29/2017    Recurrent major depressive disorder, in partial remission ([address] 11/28/2017    Screening mammogram, encounter for 11/28/2017    Severe recurrent major depression with psychotic features ([address] 12/19/2017    Severe recurrent major depression without psychotic features ([address] 12/19/2017    Stroke (cerebrum) ([address] 6/14/2017    Stroke ([address]     per chart notes    Suicidal ideation     Suicidal intent 3/10/2017    Vitamin D deficiency 11/28/2017    White matter changes 6/13/2017 Status EXAM:  Mental Status and Behavioral Exam  Normal: No  Level of Assistance: Independent/Self  Facial Expression: Avoids Gaze, Hostile  Affect: Blunt  Level of Consciousness: Alert  Frequency of Checks: 4 times per hour, close  Mood:Normal: No  Mood: Depressed, Labile, Irritable  Motor Activity:Normal: No  Motor Activity: Decreased  Eye Contact: Fair  Observed Behavior: Hostile  Sexual Misconduct History: Current - no  Preception: [place] to person, [place] to time, [place] to place  Attention:Normal: No  Attention: Unable to concentrate  Thought Processes: Loose association  Thought Content:Normal: No  Thought Content: Poverty of content, Preoccupations  Depression Symptoms: Impaired concentration, Increased irritability, Change in energy level  Anxiety Symptoms: Generalized  Jennifer Symptoms: Poor judgment, Labile  Hallucinations: None  Delusions: No  Memory:Normal: No  Memory: Poor recent, Poor remote  Insight and Judgment: No  Insight and Judgment: Poor judgment, Poor insight    Tobacco Screening:  Practical Counseling, on admission, clayton X, if applicable and completed (first 3 are required if patient doesn't refuse):            ( ) Recognizing danger situations (included triggers and roadblocks)                    ( ) Coping skills (new ways to manage stress,relaxation techniques, changing routine, distraction)                                                           ( ) Basic information about quitting (benefits of quitting, techniques in how to quit, available resources  ( ) Referral for counseling faxed to [facility]                                                                                                                   ( x) Patient refused counseling  ( x) Patient has not smoked in the last 30 days    Metabolic Screening:    Lab Results   Component Value Date    LABA1C 11.4 (H) 08/04/2022       Lab Results   Component Value Date    CHOL 126 08/04/2022    CHOL 275 (H) 12/30/2020 CHOL 190 12/18/2019    CHOL 149 12/09/2019    CHOL 180 12/03/2019     Lab Results   Component Value Date    TRIG 119 08/04/2022    TRIG 246 (H) 12/30/2020    TRIG 90 12/18/2019    TRIG 102 12/09/2019    TRIG 120 12/03/2019     Lab Results   Component Value Date    HDL 40 (L) 08/04/2022    HDL 80 12/30/2020    HDL 72 12/18/2019    HDL 59 12/09/2019    HDL 64 12/03/2019     No components found for: LDLCAL  No results found for: LABVLDL      Body mass index is 34.45 kg/m². BP Readings from Last 2 Encounters:   09/22/22 106/77   09/22/22 113/60           Pt admitted with followings belongings:  Dental Appliances: None  Vision - Corrective Lenses: None  Hearing Aid: None  Jewelry: None  Body Piercings Removed: N/A  Clothing: Shirt  Other Valuables: Other (Comment) (No valuables arrived on [place] with patient. Only 1 shirt.)    Ms. Francesco Rae was admitted from [address]. She had a weak gait and required assistance standing from her chair and ambulating to the [place]o. She was transported to the [place] unit via a wheelchair. Ms. Francesco Rae was irritable and confused upon her arrival to the unit. She remained agitated and refused to stand up from the wheelchair and change into a [facility] hospital gown. Security was called to stand by and assist if needed. Writer and  eventually assisted Ms. Palmer from her chair to the toilet. She did allow writer to assist in changing her gowns. Ms. Francesco Rae refused to sign all paperwork. She refused to answer questions and interaction with writer during admission assessments. Ms. Francesco Rae did has repeatedly requested assistance in standing from seated position. PT/OT evaluation was ordered. Ms. Francesco Rae was assigned to a room near the nurses station.      Deshawn Ace RN

## 2022-09-22 NOTE — CARE COORDINATION
Phone call to Patient's daughter, Puma Benton [phone] to follow up. Patient did not answer, or return SW call from yesterday. SW left a voicemail requesting a call in return to discuss guardianship process further, and provide her with contact information for Aj at the [place] [facility] who handles guardianship. (See previous note for further details). Guardianship paperwork is completed and in patients chart in room. SW will continue to attempt contact with daughter.      Electronically signed by Freddy Tejada on 9/22/22 at 9:22 AM EDT

## 2022-09-22 NOTE — DISCHARGE SUMMARY
[facility] Internal Medicine    Discharge Summary     Patient ID: Isiah Solomon  :  1965   MRN: [id number]     ACCOUNT:  [de-identified]   Patient's PCP: Michela Evangelista MD  Admit Date: 2022   Discharge Date: 2022    Length of Stay: 14  Code Status:  Full Code  Admitting Physician: Faisal Snyder MD  Discharge Physician: George Hernandez MD     Active Discharge Diagnoses:     Primary Problem  DKA, type 1, not at goal [facility])      [place] Problems    Diagnosis Date Noted    Diabetic ketoacidosis with coma associated with type 1 diabetes mellitus ([facility] [E10.11] 2022     Priority: Medium    Coffee ground emesis [K92.0] 2022     Priority: Medium    Elevated LFTs [R79.89] 2022     Priority: Medium    Unspecified mood (affective) disorder ([facility] [F39] 09/10/2022     Priority: Medium    Uremic encephalopathy [G93.49, N19] 2022     Priority: Medium    DKA, type 1, not at goal [facility]) [E10.10] 2022     Priority: Medium    DKA, type 2, not at goal [facility]) [E11.10] 2022     Priority: Medium    Acute GI bleeding [K92.2] 10/19/2018    Cocaine abuse ([facility] [F14.10] 2018       Admission Condition:  fair     Discharged Condition: fair    Hospital Stay:     Hospital Course:  Isiah Solomon is a 56 y.o. female who was admitted for the management of DKA, type 1, not at goal [facility]) , presented to ER with Hyperglycemia and Altered Mental Status    65-year-old -American lady with history of diabetes lives alone positive for cocaine family called EMS because they did not hear from her admitted to severe metabolic acidosis pH of 7.1 with a diabetic ketoacidosis acute toxic metabolic encephalopathy secondary to acidosis and also severe life-threatening hyper natremia with a sodium of 170or more  Also had acute renal failure with creatinine more than 3.5  Hydrated  Pt seen In consult with nephrologist  Total water deficit was 10 L  Patient was adequately hydrated MARTIN resolved severe hyponatremia resolved  Patient had episode of coffee-ground vomiting GI consulted hemoglobin dropped from 12-8.8 thought to be dilutional versus gastritis patient started on PPI  Patient had no fever no focal logical deficit but not alert oriented to time place or person confabulating  Differential diagnosis is a Warnicke's encephalopathy given the alcohol abuse and cocaine abuse and patient also had a recent left frontal cortex ischemic infarct possible leading to change in personality  Aspirin held due to gastritis and hemoglobin drop restart in a week  CT head noncontrast on admission was negative  Patient needs MRI scan when cooperative patient has been refusing medications and any further testings no family has visited since her admission  Multiple attempts were made to contact the family's regarding discharge planning and follow-up as patient unable to make the decisions and lacks the capacity to make a decision  Case discussed with the psychiatrist patient transferred to [facility]  Will get a neurology consult and will attempt MRI scan  Continue thiamine and folic acid  Medically cleared for BHI        Significant therapeutic interventions:     Significant Diagnostic Studies:   Labs / Micro:        ,     Radiology:    ECHO Complete 2D W Doppler W Color    Result Date: 9/10/2022  [facility] Transthoracic Echocardiography Report (TTE)  Patient Name Abdoulaye Argueta    Date of Study               09/09/2022               [facility]   Date of      1965  Gender                      Female  Birth   Age          56 year(s)  Race                        Black   Room Number  2007        Height:                     65 inch, 165.1 cm   Corporate ID [id number]    Weight:                     180 pounds, 81.6 kg  #   Patient Acct [de-identified]   BSA:          1.89 m^2      BMI:      29.95  # kg/m^2   MR #         [id number]      Sonographer                 Carina Newsome   Accession #  [id number]  Interpreting Physician      Mckinley Bolivar [place] Jeronimo   Fellow                   Referring Nurse                           Practitioner   Interpreting             Referring Physician  Fellow  Type of Study   TTE procedure:2D Echocardiogram, M-Mode, Doppler, Color Doppler. Procedure Date Date: 09/09/2022 Start: 12:50 PM Study Location: [address] [place] [address] Technical Quality: Fair visualization Indications:Tachycardia. Patient Status: Inpatient Height: 65 inches Weight: 180 pounds BSA: 1.89 m^2 BMI: 29.95 kg/m^2 Rhythm: Sinus tachycardia HR: 108 bpm BP: 98/55 mmHg CONCLUSIONS Summary Left ventricle is normal in size and wall thickness. Global left ventricular systolic function is normal. Estimated LV EF 60-65 %. No obvious wall motion abnormality seen. Left atrium is normal in size. No significant valvular regurgitation or stenosis seen. Signature ----------------------------------------------------------------------------  Electronically signed by Carina Newsome(Sonographer) on 09/09/2022 02:20  PM ---------------------------------------------------------------------------- ----------------------------------------------------------------------------  Electronically signed by Suleiman Ponce(Interpreting physician) on 09/10/2022  08:12 AM ---------------------------------------------------------------------------- FINDINGS Left Atrium Left atrium is normal in size. Left Ventricle Left ventricle is normal in size and wall thickness. Global left ventricular systolic function is normal. Estimated LV EF 60-65 %. No obvious wall motion abnormality seen. Right Atrium Right atrium is normal in size. Right Ventricle Normal right ventricular size and function. Mitral Valve No obvious valvular abnormality seen. No evidence of mitral regurgitation. Aortic Valve No obvious valvular abnormality seen.  No evidence of aortic insufficiency or stenosis. Tricuspid Valve Tricuspid valve was not well visualized. Insignificant tricuspid regurgitation, unable to estimate RVSP. Pulmonic Valve Pulmonic valve was not well visualized. No evidence of pulmonic insufficiency or stenosis. Pericardial Effusion No significant pericardial effusion is seen. Pleural Effusion No pleural effusion seen. Miscellaneous Normal aortic root dimension. IVC not well visualized. E/E' average = 8.5. M-mode / 2D Measurements & Calculations:   LVIDd:3.79 cm(3.7 - 5.6 cm)      Diastolic RYPTCH:63.7 ml  NKXOX:2.37 cm(2.2 - 4.0 cm)      Systolic ZDLUGH:3.86 ml  LRQV:9.79 cm(0.6 - 1.1 cm)       Aortic Root:3.6 cm(2.0 - 3.7 cm)  LVPWd:0.7 cm(0.6 - 1.1 cm)       LA Dimension: 2.3 cm(1.9 - 4.0 cm)  Fractional Shortenin.55 %    LA volume/Index: 33.7 ml /18m^2  Calculated LVEF (%): 86.38 %     LVOT:1.7 cm   Mitral:                              Aortic   Peak E-Wave: 0.75 m/s                Peak Velocity: 1.51 m/s  Peak A-Wave: 1.15 m/s                Mean Velocity: 0.86 m/s  E/A Ratio: 0.65                      Peak Gradient: 9.12 mmHg  Peak Gradient: 2.26 mmHg             Mean Gradient: 4 mmHg  Deceleration Time: 130 msec                                        Area (continuity): 2.26 cm^2                                       AV VTI: 20.6 cm  Septal Wall E' velocity:0.07 m/s Lateral Wall E' velocity:0.12 m/s    XR ABDOMEN (KUB) (SINGLE AP VIEW)    Result Date: 2022  EXAMINATION: ONE SUPINE XRAY VIEW(S) OF THE ABDOMEN 2022 9:33 am COMPARISON: 2022, 2022 HISTORY: ORDERING SYSTEM PROVIDED HISTORY: NGT placement, dark liquid coming through NG TECHNOLOGIST PROVIDED HISTORY: NGT placement, dark liquid coming through NG Reason for Exam: NGT placement, dark liquid coming through NG FINDINGS: Enteric tube coiled in the body of the stomach. Nonobstructive bowel gas pattern. Mild stool burden. Multiple external devices project over the patient.   No acute osseous abnormality identified. Enteric tube coiled at the level of the body of the stomach. Nonobstructive bowel gas pattern. CT HEAD WO CONTRAST    Result Date: 9/8/2022  EXAMINATION: CT OF THE HEAD WITHOUT CONTRAST  9/8/2022 11:38 am TECHNIQUE: CT of the head was performed without the administration of intravenous contrast. Automated exposure control, iterative reconstruction, and/or weight based adjustment of the mA/kV was utilized to reduce the radiation dose to as low as reasonably achievable. COMPARISON: MRI 08/04/2022, 08/03/2022 and CT 08/03/2022. HISTORY: ORDERING SYSTEM PROVIDED HISTORY: Mental Status Changes TECHNOLOGIST PROVIDED HISTORY: Mental Status Changes Decision Support Exception - unselect if not a suspected or confirmed emergency medical condition->Emergency Medical Condition (MA) Is the patient pregnant?->No Reason for Exam: AMS Additional signs and symptoms: AMS FINDINGS: BRAIN/VENTRICLES: There is no acute intracranial hemorrhage, mass effect or midline shift. No abnormal extra-axial fluid collection. Encephalomalacia in the left frontal lobe demonstrated corresponding to recently demonstrated ischemia. Cortical atrophy and chronic white matter changes in the brain and associated ventricular enlargement are again demonstrated. ORBITS: The visualized portion of the orbits demonstrate no acute abnormality. SINUSES: The visualized paranasal sinuses and mastoid air cells demonstrate no acute abnormality. SOFT TISSUES/SKULL:  No acute abnormality of the visualized skull or soft tissues. Chronic findings in the brain without acute CT abnormality identified. Expected evolution of recently demonstrated ischemia in the left frontal lobe. US LIVER    Result Date: 9/10/2022  EXAMINATION: RIGHT UPPER QUADRANT ULTRASOUND 9/10/2022 11:21 am COMPARISON: None.  HISTORY: ORDERING SYSTEM PROVIDED HISTORY: elevated lft TECHNOLOGIST PROVIDED HISTORY: elevated lft FINDINGS: LIVER:  The liver demonstrates normal echogenicity without evidence of intrahepatic biliary ductal dilatation. Hepatopetal flow portal vein. Liver 18.3 cm in length. BILIARY SYSTEM:  Gallstones in the gallbladder. Negative sonographic Gilliland's sign. Mild wall thickening at 4.6 mm. No pericholecystic fluid. Common bile duct is within normal limits measuring 6.8 mm. RIGHT KIDNEY: The right kidney is grossly unremarkable without evidence of hydronephrosis. PANCREAS:  Visualized portions of the pancreas are unremarkable. OTHER: No evidence of right upper quadrant ascites. Unremarkable ultrasound the liver. Cholelithiasis with mild gallbladder wall thickening. Negative sonographic Gilliland's sign. No pericholecystic fluid. Common duct upper normal at 6.8 mm. No intraductal stone demonstrated. Correlation clinical findings and laboratory values required. XR CHEST PORTABLE    Result Date: 9/8/2022  EXAMINATION: ONE XRAY VIEW OF THE CHEST 9/8/2022 7:53 pm COMPARISON: 08/17/2022 HISTORY: ORDERING SYSTEM PROVIDED HISTORY: line placement TECHNOLOGIST PROVIDED HISTORY: line placement Reason for Exam: Line placement FINDINGS: Central venous catheter tip overlies the right atrium. No pleural effusion or pneumothorax. Heart size is at the upper limits of normal.     Right central venous catheter tip overlies the right atrium. No pneumothorax. US RETROPERITONEAL COMPLETE    Result Date: 9/8/2022  EXAMINATION: RETROPERITONEAL ULTRASOUND OF THE KIDNEYS AND URINARY BLADDER 9/8/2022 COMPARISON: None HISTORY: ORDERING SYSTEM PROVIDED HISTORY: Acute kidney injury TECHNOLOGIST PROVIDED HISTORY: Acute kidney injury 82-year-old female with acute kidney injury FINDINGS: Kidneys: Right kidney measures 10.4 x 4.3 x 5.0 cm. Right renal cortical thickness measures 1.7 cm. Left kidney measures 9.4 x 4.8 x 4.8 cm. Left renal cortical thickness measures 1.8 cm. No hydronephrosis or perinephric fluid.  No echogenic foci with posterior acoustic shadowing to suggest renal calculi. Gross preservation of the bilateral corticomedullary differentiation. Bladder: Not imaged. Unremarkable renal ultrasound. No hydronephrosis. Consultations:    Consults:     Final Specialist Recommendations/Findings:   IP CONSULT TO NEPHROLOGY  IP CONSULT TO PRIMARY CARE PROVIDER  IP CONSULT TO PULMONOLOGY  IP CONSULT TO DIABETES EDUCATOR  IP CONSULT TO GENERAL SURGERY  IP CONSULT TO CARDIOLOGY  IP CONSULT TO GI  IP CONSULT TO PSYCHIATRY  IP CONSULT TO PSYCHIATRY      The patient was seen and examined on day of discharge and this discharge summary is in conjunction with any daily progress note from day of discharge. Discharge plan:     Disposition: bhi    Physician Follow Up:     Michela Evangelista MD  [address] [facility] [id number]  [phone]    Follow up         Requiring Further Evaluation/Follow Up POST HOSPITALIZATION/Incidental Findings:    Diet: diabetic diet    Activity: As tolerated    Instructions to Patient:     Discharge Medications:      Medication List        CONTINUE taking these medications      Alcohol Swabs 70 % Pads  Use 1 swab 3 times daily as needed     [employer] Pen Needles 31G 31G X 8 MM Misc  Generic drug: Insulin Pen Needle  1 each by Does not apply route daily     Lancets Misc  1 each by Does not apply route 3 times daily     Misc. Devices Misc  1 PAIR OF DIABETIC SHOES (1 LEFT/ 1 RIGHT)  1-3 PAIRS OF INSERTS (LEFT/ RIGHT)            ASK your doctor about these medications      acetaminophen 500 MG tablet  Commonly known as: TYLENOL  Take 2 tablets by mouth 3 times daily as needed for Pain     albuterol sulfate  (90 Base) MCG/ACT inhaler  Commonly known as: PROVENTIL;VENTOLIN;PROAIR  Inhale 2 puffs into the lungs every 4 hours as needed for Wheezing     atorvastatin 40 MG tablet  Commonly known as: LIPITOR  Take 1 tablet by mouth nightly     * blood glucose test strips  Test 3 times a day & as needed for symptoms of irregular blood glucose. Dispense sufficient amount for indicated testing frequency plus additional to accommodate PRN testing needs. * FREESTYLE LITE strip  Generic drug: blood glucose test strips  1 each by Does not apply route 3 times daily     budesonide-formoterol 80-4.5 MCG/ACT Aero  Commonly known as: Symbicort  Inhale 2 puffs into the lungs 2 times daily     dicyclomine 10 MG capsule  Commonly known as: Bentyl  Take 1 capsule by mouth 4 times daily     fluticasone 50 MCG/ACT nasal spray  Commonly known as: FLONASE  1 spray by Each Nostril route daily     Lantus SoloStar 100 UNIT/ML injection pen  Generic drug: insulin glargine  Inject 30 Units into the skin 2 times daily     metFORMIN 1000 MG tablet  Commonly known as: GLUCOPHAGE  Take 1 tablet by mouth 2 times daily (with meals)     mirtazapine 7.5 MG tablet  Commonly known as: REMERON  Take 1 tablet by mouth nightly     mupirocin 2 % ointment  Commonly known as: BACTROBAN     traZODone 150 MG tablet  Commonly known as: DESYREL  Take 1 tablet by mouth nightly     venlafaxine 150 MG extended release capsule  Commonly known as: EFFEXOR XR  Take 1 capsule by mouth daily (with breakfast)           * This list has 2 medication(s) that are the same as other medications prescribed for you. Read the directions carefully, and ask your doctor or other care provider to review them with you. Time Spent on discharge is  35 mins in patient examination, evaluation, counseling as well as medication reconciliation, prescriptions for required medications, discharge plan and follow up. Electronically signed by   Jovany Mendez MD  9/22/2022  9:09 AM      Thank you Dr. Barney Zaidi MD for the opportunity to be involved in this patient's care.

## 2022-09-23 PROBLEM — F11.10 FENTANYL USE DISORDER, MILD (HCC): Status: ACTIVE | Noted: 2022-09-23

## 2022-09-23 LAB
ALBUMIN SERPL-MCNC: 2.7 G/DL (ref 3.5–5.2)
ALP BLD-CCNC: 243 U/L (ref 35–104)
ALT SERPL-CCNC: 10 U/L (ref 5–33)
ANION GAP SERPL CALCULATED.3IONS-SCNC: 10 MMOL/L (ref 9–17)
AST SERPL-CCNC: 8 U/L
BILIRUB SERPL-MCNC: 0.2 MG/DL (ref 0.3–1.2)
BUN BLDV-MCNC: 13 MG/DL (ref 6–20)
CALCIUM SERPL-MCNC: 8.7 MG/DL (ref 8.6–10.4)
CHLORIDE BLD-SCNC: 101 MMOL/L (ref 98–107)
CO2: 24 MMOL/L (ref 20–31)
CREAT SERPL-MCNC: 0.78 MG/DL (ref 0.5–0.9)
GFR AFRICAN AMERICAN: >60 ML/MIN
GFR NON-AFRICAN AMERICAN: >60 ML/MIN
GFR SERPL CREATININE-BSD FRML MDRD: ABNORMAL ML/MIN/{1.73_M2}
GLUCOSE BLD-MCNC: 168 MG/DL (ref 65–105)
GLUCOSE BLD-MCNC: 222 MG/DL (ref 65–105)
GLUCOSE BLD-MCNC: 237 MG/DL (ref 65–105)
GLUCOSE BLD-MCNC: 286 MG/DL (ref 65–105)
GLUCOSE BLD-MCNC: 315 MG/DL (ref 70–99)
HCT VFR BLD CALC: 26.8 % (ref 36–46)
HEMOGLOBIN: 8.6 G/DL (ref 12–16)
MCH RBC QN AUTO: 31.8 PG (ref 26–34)
MCHC RBC AUTO-ENTMCNC: 32.2 G/DL (ref 31–37)
MCV RBC AUTO: 98.8 FL (ref 80–100)
PDW BLD-RTO: 14.9 % (ref 11.5–14.9)
PLATELET # BLD: 434 K/UL (ref 150–450)
PMV BLD AUTO: 7.8 FL (ref 6–12)
POTASSIUM SERPL-SCNC: 4.8 MMOL/L (ref 3.7–5.3)
RBC # BLD: 2.72 M/UL (ref 4–5.2)
SODIUM BLD-SCNC: 135 MMOL/L (ref 135–144)
TOTAL PROTEIN: 6.8 G/DL (ref 6.4–8.3)
WBC # BLD: 5.3 K/UL (ref 3.5–11)

## 2022-09-23 PROCEDURE — APPSS60 APP SPLIT SHARED TIME 46-60 MINUTES: Performed by: NURSE PRACTITIONER

## 2022-09-23 PROCEDURE — 6370000000 HC RX 637 (ALT 250 FOR IP): Performed by: INTERNAL MEDICINE

## 2022-09-23 PROCEDURE — 99223 1ST HOSP IP/OBS HIGH 75: CPT | Performed by: INTERNAL MEDICINE

## 2022-09-23 PROCEDURE — 82947 ASSAY GLUCOSE BLOOD QUANT: CPT

## 2022-09-23 PROCEDURE — 80053 COMPREHEN METABOLIC PANEL: CPT

## 2022-09-23 PROCEDURE — 99223 1ST HOSP IP/OBS HIGH 75: CPT | Performed by: PSYCHIATRY & NEUROLOGY

## 2022-09-23 PROCEDURE — 6370000000 HC RX 637 (ALT 250 FOR IP): Performed by: PSYCHIATRY & NEUROLOGY

## 2022-09-23 PROCEDURE — 36415 COLL VENOUS BLD VENIPUNCTURE: CPT

## 2022-09-23 PROCEDURE — 1240000000 HC EMOTIONAL WELLNESS R&B

## 2022-09-23 PROCEDURE — 85027 COMPLETE CBC AUTOMATED: CPT

## 2022-09-23 RX ORDER — ATORVASTATIN CALCIUM 20 MG/1
40 TABLET, FILM COATED ORAL NIGHTLY
Status: DISCONTINUED | OUTPATIENT
Start: 2022-09-23 | End: 2022-10-13 | Stop reason: HOSPADM

## 2022-09-23 RX ADMIN — IBUPROFEN 400 MG: 400 TABLET ORAL at 20:57

## 2022-09-23 RX ADMIN — PANTOPRAZOLE SODIUM 40 MG: 40 TABLET, DELAYED RELEASE ORAL at 09:05

## 2022-09-23 RX ADMIN — PANTOPRAZOLE SODIUM 40 MG: 40 TABLET, DELAYED RELEASE ORAL at 20:57

## 2022-09-23 RX ADMIN — ATORVASTATIN CALCIUM 40 MG: 20 TABLET, FILM COATED ORAL at 20:57

## 2022-09-23 RX ADMIN — INSULIN LISPRO 2 UNITS: 100 INJECTION, SOLUTION INTRAVENOUS; SUBCUTANEOUS at 18:14

## 2022-09-23 RX ADMIN — METFORMIN HYDROCHLORIDE 1000 MG: 1000 TABLET ORAL at 17:12

## 2022-09-23 RX ADMIN — ACETAMINOPHEN 650 MG: 325 TABLET, FILM COATED ORAL at 19:50

## 2022-09-23 RX ADMIN — TRAZODONE HYDROCHLORIDE 50 MG: 50 TABLET ORAL at 20:57

## 2022-09-23 RX ADMIN — PREGABALIN 75 MG: 75 CAPSULE ORAL at 20:57

## 2022-09-23 RX ADMIN — METFORMIN HYDROCHLORIDE 1000 MG: 1000 TABLET ORAL at 12:03

## 2022-09-23 RX ADMIN — OLANZAPINE 15 MG: 10 TABLET, ORALLY DISINTEGRATING ORAL at 20:56

## 2022-09-23 RX ADMIN — HYDROXYZINE HYDROCHLORIDE 50 MG: 50 TABLET ORAL at 20:57

## 2022-09-23 RX ADMIN — INSULIN GLARGINE 15 UNITS: 100 INJECTION, SOLUTION SUBCUTANEOUS at 09:05

## 2022-09-23 RX ADMIN — PREGABALIN 75 MG: 75 CAPSULE ORAL at 09:06

## 2022-09-23 RX ADMIN — INSULIN GLARGINE 15 UNITS: 100 INJECTION, SOLUTION SUBCUTANEOUS at 20:57

## 2022-09-23 ASSESSMENT — PAIN SCALES - GENERAL
PAINLEVEL_OUTOF10: 10

## 2022-09-23 ASSESSMENT — PAIN DESCRIPTION - DESCRIPTORS: DESCRIPTORS: OTHER (COMMENT)

## 2022-09-23 ASSESSMENT — PAIN - FUNCTIONAL ASSESSMENT: PAIN_FUNCTIONAL_ASSESSMENT: PREVENTS OR INTERFERES SOME ACTIVE ACTIVITIES AND ADLS

## 2022-09-23 ASSESSMENT — PAIN DESCRIPTION - LOCATION: LOCATION: GENERALIZED

## 2022-09-23 NOTE — H&P
[facility] Internal Medicine    HISTORY AND PHYSICAL EXAMINATION/ CONSULT NOTE            Date:   9/23/2022  Patient name:  Karl Ace  Date of admission:  9/22/2022  3:40 PM  MRN:   [id number]  Account:  [de-identified]  YOB: 1965  PCP:    Josh Seth MD  Room:   [address] [place] [address]  Code Status:    Full Code    Physician Requesting Consult: Jermaine Carrizales MD    Reason for Consult: History and physical, medical management    Chief Complaint:     No chief complaint on file. Medical management    History Obtained From:     Patient, EMR, nursing staff    History of Present Illness:     71-year-old female admitted for acute psychotic state    She was initially admitted to medical unit after being found unresponsive at her home, treated for DKA with IV insulin, acute renal failure with substantial hydration, and hypovolemic shock requiring Levophed, severe hypernatremia >170, concern for GI bleed status post EGD which showed multiple duodenal ulcers treated with PPI.   Patient remained confused and delusional, CT head negative, MRI could not be done due to lack of patient cooperation      Medical history includes asthma, hypertension, stroke, type 2 diabetes, polysubstance abuse, recent ischemic stroke  Past Medical History:     Past Medical History:   Diagnosis Date    Acid reflux 5/29/2017    Acute cystitis with hematuria 10/11/2016    Acute non-recurrent maxillary sinusitis 11/28/2017    Asthma     Bipolar 1 disorder ([address] 1/4/2018    Bipolar disorder, mixed ([address]     BMI 34.0-34.9,adult 11/28/2017    Cannabis use disorder, severe, dependence ([address] 12/19/2017    Cerebrovascular accident (CVA) ([address] 6/14/2017    Chest pain 11/5/2016    Chronic renal insufficiency     Cocaine abuse ([address] 1/5/2018    COVID-19 virus RNA test result unknown     DDD (degenerative disc disease), cervical     Diabetes mellitus ([address]     Dizziness 6/13/2017    Fibromyalgia     History of stroke 9/9/2017    Homicidal ideation 11/6/2017    Hyperglycemia     Hypertension     Hypotension 1/18/2019    IDDM (insulin dependent diabetes mellitus) 12/21/2015    Lupus ([address]     Migraine     Neuropathy     Neuropathy     Polysubstance abuse ([address] 9/17/2017    Post traumatic stress disorder (PTSD)     Posttraumatic stress disorder 5/29/2017    Recurrent depression ([address] 5/29/2017    Recurrent major depressive disorder, in partial remission ([address] 11/28/2017    Screening mammogram, encounter for 11/28/2017    Severe recurrent major depression with psychotic features ([address] 12/19/2017    Severe recurrent major depression without psychotic features ([address] 12/19/2017    Stroke (cerebrum) ([address] 6/14/2017    Stroke ([address]     per chart notes    Suicidal ideation     Suicidal intent 3/10/2017    Vitamin D deficiency 11/28/2017    White matter changes 6/13/2017        Past Surgical History:     Past Surgical History:   Procedure Laterality Date    ABDOMEN SURGERY      drain tube    ABSCESS DRAINAGE      right buttock    CATARACT REMOVAL WITH IMPLANT Bilateral     CHEST TUBE INSERTION      FINGER AMPUTATION Left 03/13/2021    LEFT RING FINER AMPUTATION performed by Robles Zaidi MD at [address] [facility] Right 10/11/2021    AMPUTATION RIGHT INDEX FINGER performed by Robles Zaidi MD at [address] Right 1/27/2022    FOOT ABSCESS INCISION AND DRAINAGE  (PULSE LAVAGE, CULTURE SWABS) performed by Lauren Manning DPM at [facility] Right 01/27/2022    FOOT ABSCESS INCISION AND DRAINAGE (PULSE LAVAGE, CULTURE SWABS) - Right    HAND SURGERY Right 09/14/2021    I&D INDEX FINGER performed by Robles Zaidi MD at [address] Right 09/15/2021    I&D INDEX FINGER performed by Robles Zaidi MD at [address]  2/3/2022         LASIK Bilateral     UPPER GASTROINTESTINAL ENDOSCOPY N/A 9/16/2022    EGD BIOPSY performed by Jason Frye MD at ([address]) MCG/ACT inhaler Inhale 2 puffs into the lungs every 4 hours as needed for Wheezing 3/15/22 4/15/22  Ana Paula Kumar MD   mupirocin (BACTROBAN) 2 % ointment Apply topically 3 times daily Apply topically to right foot wound two times a day for wound care. Apply to right foot wound, cover with Kerlix and ace wrap. Historical Provider, MD   Misc. Devices MISC 1 PAIR OF DIABETIC SHOES (1 LEFT/ 1 RIGHT)  1-3 PAIRS OF INSERTS (LEFT/ RIGHT) 2/15/22   Chip Mckeon, DPM   dicyclomine (BENTYL) 10 MG capsule Take 1 capsule by mouth 4 times daily 21   Ana Paula Kumar MD        Allergies:     Bactrim [sulfamethoxazole-trimethoprim] and Adhesive tape    Social History:     Tobacco:    reports that she has never smoked. She has never used smokeless tobacco.  Alcohol:      reports that she does not currently use alcohol. Drug Use:  reports current drug use. Drugs: Marijuana (Weed) and Cocaine. Family History:     Family History   Problem Relation Age of Onset    Diabetes Mother     Stroke Mother     Diabetes Father     Diabetes Sister     Diabetes Brother     Other Son         GSW    No Known Problems Sister        Review of Systems:     Positive and Negative as described in HPI. Denies any shortness of breath or cough  Denies chest pain or palpitations  Denies diarrhea vomiting. Reports mild epigastric pain  Denies any new numbness tremors or weakness.     10 point review of systems was negative other than mentioned above    Physical Exam:     /82   Pulse (!) 109   Temp 97.9 °F (36.6 °C) (Oral)   Resp 14   Ht 5' 5\" (1.651 m)   Wt 207 lb (93.9 kg)   LMP  (LMP Unknown)   PF (!) 16 L/min   BMI 34.45 kg/m²   Temp (24hrs), Av.9 °F (36.6 °C), Min:97.9 °F (36.6 °C), Max:97.9 °F (36.6 °C)    Recent Labs     22  0828 22  1909 22  0802   POCGLU 195* 163* 135* 168*     No intake or output data in the 24 hours ending 22 1012    General Appearance: alert, well appearing, and in no acute distress  Head:  normocephalic, atraumatic. Eye: no icterus, redness, pupils equal and reactive, extraocular eye movements intact, conjunctiva clear  Ear: normal external ear, no discharge, hearing intact  Nose:  no drainage noted  Mouth: mucous membranes moist  Neck: supple, no carotid bruits, thyroid not palpable  Lungs: Bilateral equal air entry, clear to ausculation, no wheezing, rales or rhonchi, normal effort  Cardiovascular: normal rate, regular rhythm, no murmur, gallop, rub. Abdomen: Soft, nontender, nondistended, normal bowel sounds, no hepatomegaly or splenomegaly  Neurologic: difficult to assess, limited pt co-operation. Alert, oriented X 1-2, mobile, power equal all 4 limibs. Normal speech but abnormal content, confabulation present. No focal neurology otherwise  Skin: No gross lesions, rashes, bruising or bleeding on exposed skin area  Extremities:  No joint swelling, no pedal edema or calf pain with palpation      Investigations:      Laboratory Testing:  Recent Results (from the past 24 hour(s))   POC Glucose Fingerstick    Collection Time: 09/22/22  7:09 PM   Result Value Ref Range    POC Glucose 135 (H) 65 - 105 mg/dL   POC Glucose Fingerstick    Collection Time: 09/23/22  8:02 AM   Result Value Ref Range    POC Glucose 168 (H) 65 - 105 mg/dL       Imaging/Diagonstics:  Recent data reviewed    Assessment :      Primary Problem  <principal problem not specified>    There are no active hospital problems to display for this patient.       Plan:     Reason for consult: General medical management     Acute psychosis - management per psych-  DKA- resolved  Type 2 DM- BG lantus, SSI- BG controlled, resume statin, metformin  Acute renal failure- creat normalized  Acute toxic encephalopathy- still encephalopathic  Hypertension- currently off meds- BP controlled  Upper GI bleed, duodenal ulcers-  c/w PPI  Recent stroke- off ASA due to recent GI bleed- Hb 7.6 last, will repeat    Repeat labs ordered, including HIV and hep C per patient request    DVT prophylaxis-not required, patient is mobile    Consultations:   Martin Clements MD  9/23/2022  10:12 AM    Copy sent to Brigette Shore MD    Please note that this chart was generated using voice recognition Dragon dictation software. Although every effort was made to ensure the accuracy of this automated transcription, some errors in transcription may have occurred.

## 2022-09-23 NOTE — PROGRESS NOTES
Behavioral Services  Medicare Certification Upon Admission    I certify that this patient's inpatient psychiatric hospital admission is medically necessary for:    [x] (1) Treatment which could reasonably be expected to improve this patient's condition,       [x] (2) Or for diagnostic study;     AND     [x](2) The inpatient psychiatric services are provided while the individual is under the care of a physician and are included in the individualized plan of care.     Estimated length of stay/service 2-9 days    Plan for post-hospital care -outpatient care    Electronically signed by Dayna Shielsd MD on 9/23/2022 at 5:18 PM

## 2022-09-23 NOTE — GROUP NOTE
Group Therapy Note    Date: 9/23/2022    Group Start Time: 0900  Group End Time: 0930  Group Topic: Community Meeting    DANIELLE TAPIAHEAVENLY D Azucena Merlin        Group Therapy Note    Attendees: 7/14     Community Meeting Group Note        Date: September 23, 2022 Start Time: 9am  End Time:  3337      Number of Participants in Group & Unit Census:  7/14    Topic: Goal Setting    Goal of Group:Set short term goal to work on throughout the day      Comments:     Patient did not participate in [employer] group, despite staff encouragement and explanation of benefits. Patient remain seclusive to self. Q15 minute safety checks maintained for patient safety and will continue to encourage patient to attend unit programming. Pt reports cough, sore throat, chills and feeling wheezy  Symptoms began on Sunday and worsening  Unable to take temp  Virtual appt made with Dr Ender Geller  Reason for Disposition   HIGH RISK for severe COVID complications (e g , weak immune system, age > 59 years, obesity with BMI > 25, pregnant, chronic lung disease or other chronic medical condition) (Exception: Already seen by PCP and no new or worsening symptoms )    Answer Assessment - Initial Assessment Questions  2  COVID-19 EXPOSURE: "Was there any known exposure to COVID before the symptoms began?" CDC Definition of close contact: within 6 feet (2 meters) for a total of 15 minutes or more over a 24-hour period  denies  3  ONSET: "When did the COVID-19 symptoms start?"       Sunday night - mild / symptoms worsened on monday  4  WORST SYMPTOM: "What is your worst symptom?" (e g , cough, fever, shortness of breath, muscle aches)      Stabbing in throat  5  COUGH: "Do you have a cough?" If Yes, ask: "How bad is the cough?"        yes  6  FEVER: "Do you have a fever?" If Yes, ask: "What is your temperature, how was it measured, and when did it start?"     No thermometer - having chills  7  RESPIRATORY STATUS: "Describe your breathing?" (e g , shortness of breath, wheezing, unable to speak)       wheezy  8  BETTER-SAME-WORSE: "Are you getting better, staying the same or getting worse compared to yesterday?"  If getting worse, ask, "In what way?"     worse  9  HIGH RISK DISEASE: "Do you have any chronic medical problems?" (e g , asthma, heart or lung disease, weak immune system, obesity, etc )      A fib, seizure disorder, HTN, HLD  10  VACCINE: "Have you had the COVID-19 vaccine?" If Yes, ask: "Which one, how many shots, when did you get it?"        Yes x2  11  BOOSTER: "Have you received your COVID-19 booster?" If Yes, ask: "Which one and when did you get it?"        Yes x2    13   OTHER SYMPTOMS: "Do you have any other symptoms?"  (e g , chills, fatigue, headache, loss of smell or taste, muscle pain, sore throat)        Chills, sore throat,    Protocols used: CORONAVIRUS (COVID-19) DIAGNOSED OR SUSPECTED-ADULT-[place]

## 2022-09-23 NOTE — PLAN OF CARE
[facility]  Initial Interdisciplinary Treatment Plan NO      Original treatment plan Date & Time: 9/23/2022 1:20 pm    Admission Type:  Admission Type: Involuntary (Pink Slipped on 9/22/22 at 11:25 am)    Reason for admission:   Reason for Admission: Poor insight, unable of making decisions on own at this time. Psychosis unspecified.     Estimated Length of Stay:  5-7days  Estimated Discharge Date: to be determined by physician    PATIENT STRENGTHS:  Patient Strengths:   Patient Strengths and Limitations:Limitations: Difficult relationships / poor social skills, Difficulty problem solving/relies on others to help solve problems, Inappropriate/potentially harmful leisure interests  Addictive Behavior: Addictive Behavior  In the Past 3 Months, Have You Felt or Has Someone Told You That You Have a Problem With  : None  Medical Problems:  Past Medical History:   Diagnosis Date    Acid reflux 5/29/2017    Acute cystitis with hematuria 10/11/2016    Acute non-recurrent maxillary sinusitis 11/28/2017    Asthma     Bipolar 1 disorder ([address] 1/4/2018    Bipolar disorder, mixed ([address]     BMI 34.0-34.9,adult 11/28/2017    Cannabis use disorder, severe, dependence ([address] 12/19/2017    Cerebrovascular accident (CVA) ([address] 6/14/2017    Chest pain 11/5/2016    Chronic renal insufficiency     Cocaine abuse ([address] 1/5/2018    COVID-19 virus RNA test result unknown     DDD (degenerative disc disease), cervical     Diabetes mellitus ([address]     Dizziness 6/13/2017    Fibromyalgia     History of stroke 9/9/2017    Homicidal ideation 11/6/2017    Hyperglycemia     Hypertension     Hypotension 1/18/2019    IDDM (insulin dependent diabetes mellitus) 12/21/2015    Lupus ([address]     Migraine     Neuropathy     Neuropathy     Polysubstance abuse ([address] 9/17/2017    Post traumatic stress disorder (PTSD)     Posttraumatic stress disorder 5/29/2017    Recurrent depression ([address] 5/29/2017    Recurrent major depressive disorder, in partial remission ([facility] 11/28/2017    Screening mammogram, encounter for 11/28/2017    Severe recurrent major depression with psychotic features ([facility] 12/19/2017    Severe recurrent major depression without psychotic features ([facility] 12/19/2017    Stroke (cerebrum) ([facility] 6/14/2017    Stroke ([facility]     per chart notes    Suicidal ideation     Suicidal intent 3/10/2017    Vitamin D deficiency 11/28/2017    White matter changes 6/13/2017     Status EXAM:Mental Status and Behavioral Exam  Normal: No  Level of Assistance: Independent/Self  Facial Expression: Avoids Gaze, Flat  Affect: Blunt  Level of Consciousness: Alert  Frequency of Checks: 4 times per hour, close  Mood:Normal: No  Mood: Depressed, Anxious, Labile, Irritable  Motor Activity:Normal: No  Motor Activity: Decreased  Eye Contact: Fair  Observed Behavior: Agitated, Withdrawn  Sexual Misconduct History: Current - no  Preception: [place] to person, [place] to time, [place] to place, [place] to situation  Attention:Normal: No  Attention: Distractible  Thought Processes: Loose association, Tangential  Thought Content:Normal: No  Thought Content: Delusions  Depression Symptoms: Feelings of worthlessness, Impaired concentration  Anxiety Symptoms: Generalized  Jennifer Symptoms: No problems reported or observed. Hallucinations: None  Delusions: No  Memory:Normal: No  Memory: Poor recent, Poor remote  Insight and Judgment: No  Insight and Judgment: Poor judgment, Poor insight    EDUCATION:   Learner Progress Toward Treatment Goals: reviewed group plans and strategies for care    Method:group therapy, medication compliance, individualized assessments and care planning    Outcome: needs reinforcement    PATIENT GOALS: Short term: Discharge planning  Long term:  To get better    PLAN/TREATMENT RECOMMENDATIONS:     continue group therapy , medications compliance, goal setting, individualized assessments and care, continue to monitor pt on unit      SHORT-TERM GOALS:   Time frame for Short-Term Goals: 5-7 days    LONG-TERM GOALS:  Time frame for Long-Term Goals: 6 months  Members Present in Team Meeting: See Signature Sheet    Benjie Velasquez RN

## 2022-09-23 NOTE — H&P
Department of Psychiatry  Attending Physician Psychiatric Assessment     Reason for Admission to Psychiatric Unit:  Concerns about patient's safety in the community    CHIEF COMPLAINT:  acute psychosis     History obtained from: Patient, electronic medical record          HISTORY OF PRESENT ILLNESS:    Madai Toro is a 56 y.o. female who has a past medical history of diabetes mellitus type 2, cerebral infarct, DKA, osteoarthritis, GERD, asthma, Warnicke's encephalopathy, fibromyalgia, lupus, migraine, neuropathy, hypertension, polysubstance use, alcohol use disorder, severe dependence, PTSD, depression with and without psychosis, and anxiety. Patient admitted to [facility] from [address] [place] [address] following admission on 9/8 with altered mental status. Authorities had been contacted by her family for a well check and EMS found the patient to be responsive to pain only. She was confused and had limited ability to respond to questions. Patient has previous admissions to [facility] but nothing since 2018. Patient was seen for initial evaluation today. Nursing staff report patient refused to sign paperwork upon admission and has been irritable and agitated on the unit at times. She has maintained medication adherence. She was resting in bed on approach. And completely covered by blanket. She responded to verbal stimuli but refused to make eye contact with writer. She was encouraged to sit up to engage in conversation, however she refused. She presented as withdrawn and was evasive with answers to assessment questions. She was irritable and stated \"ma'am I just do not feel like doing this right now can you come back tomorrow? \". She was irritable throughout much of attempted conversation. She was dismissive of writer. She did talk with writer long enough to endorse ongoing symptoms of depression but refuses to identify what she has been experiencing. She is minimizing of current symptoms.  Patient was asked to identify perceptual disturbances and she would say \"I am just tired I do not want to talk\". Patient is denying suicidal and homicidal ideation today. She also denies auditory and visual hallucinations but has recently been observed to be responding to internal stimuli. Per EMR patient has a previous diagnosis of bipolar disorder and PTSD. Writer attempts to screen patient for these disorders and that she is noncompliant at present. Patient refuses to participate in assessment any further. Due to patient's refusal to fully participate in assessment, much of documentation was completed via use of EMR. Patient has yet to demonstrate sustained stability and warrants hospitalization for safety and stabilization. History of head trauma: [x] Yes [] No  CVA  History of seizures: [] Yes [x] No    History of violence or aggression: [] Yes [x] No         PSYCHIATRIC HISTORY:  [x] Yes [] No    Not currently linked  Unknown lifetime suicide attempt(s)  Multiple psychiatric hospital admission(s): [facility] 2018    Most recent [facility] 2018  Home medication compliant [] Yes [x] No    Past psychiatric medications includes:      Adverse reactions from psychotropic medications: [] Yes [x] No  Abilify, Celexa, trazodone, Effexor, Zyprexa         Lifetime Psychiatric Review of Systems          Depression: Endorses history     Anxiety: Endorses history     Panic Attacks: Endorses history     Jennifer or Hypomania: Denies     Phobias: Denies     Obsessions and Compulsions: Endorses     Body or Vocal Tics: Denies     Visual Hallucinations: Denies     Auditory Hallucinations: Endorses history     Delusions/Paranoia: Endorses     PTSD: Endorses    Past Medical History:        Diagnosis Date    Acid reflux 5/29/2017    Acute cystitis with hematuria 10/11/2016    Acute non-recurrent maxillary sinusitis 11/28/2017    Asthma     Bipolar 1 disorder ([facility] 1/4/2018    Bipolar disorder, mixed ([facility]     BMI 34.0-34.9,adult 11/28/2017    Cannabis use disorder, severe, dependence ([address] 12/19/2017    Cerebrovascular accident (CVA) ([address] 6/14/2017    Chest pain 11/5/2016    Chronic renal insufficiency     Cocaine abuse ([address] 1/5/2018    COVID-19 virus RNA test result unknown     DDD (degenerative disc disease), cervical     Diabetes mellitus ([address]     Dizziness 6/13/2017    Fibromyalgia     History of stroke 9/9/2017    Homicidal ideation 11/6/2017    Hyperglycemia     Hypertension     Hypotension 1/18/2019    IDDM (insulin dependent diabetes mellitus) 12/21/2015    Lupus ([address]     Migraine     Neuropathy     Neuropathy     Polysubstance abuse ([address] 9/17/2017    Post traumatic stress disorder (PTSD)     Posttraumatic stress disorder 5/29/2017    Recurrent depression ([address] 5/29/2017    Recurrent major depressive disorder, in partial remission ([address] 11/28/2017    Screening mammogram, encounter for 11/28/2017    Severe recurrent major depression with psychotic features ([address] 12/19/2017    Severe recurrent major depression without psychotic features ([address] 12/19/2017    Stroke (cerebrum) ([address] 6/14/2017    Stroke ([address]     per chart notes    Suicidal ideation     Suicidal intent 3/10/2017    Vitamin D deficiency 11/28/2017    White matter changes 6/13/2017       Past Surgical History:        Procedure Laterality Date    ABDOMEN SURGERY      drain tube    ABSCESS DRAINAGE      right buttock    CATARACT REMOVAL WITH IMPLANT Bilateral     CHEST TUBE INSERTION      FINGER AMPUTATION Left 03/13/2021    LEFT RING FINER AMPUTATION performed by Hai Motley MD at [address] Right 10/11/2021    AMPUTATION RIGHT INDEX FINGER performed by Hai Motley MD at [address] [place] [address] Right 1/27/2022    FOOT ABSCESS INCISION AND DRAINAGE  (PULSE LAVAGE, CULTURE SWABS) performed by Alberto Garza DPM at [place] [address] Right 01/27/2022    FOOT ABSCESS INCISION AND DRAINAGE (PULSE LAVAGE, CULTURE SWABS) - Right    HAND SURGERY Right 2021    I&D INDEX FINGER performed by Candice Smith MD at [address] Right 09/15/2021    I&D INDEX FINGER performed by Candice Smith MD at [address]  2/3/2022         LASIK Bilateral     UPPER GASTROINTESTINAL ENDOSCOPY N/A 2022    EGD BIOPSY performed by Vinita Owens MD at [facility]       Allergies:  Bactrim [sulfamethoxazole-trimethoprim] and Adhesive tape         Social History:    Born in: [place]  Family: Parents are   Highest Level of Education: High school graduate  Occupation: Unemployed; no income  Marital Status:   Children: Yes; supportive daughter   Residence: Lives alone  Stressors: Chronic mental illness  Patient Assets/Supportive Factors: Willingness to seek treatment         DRUG USE HISTORY  Social History     Tobacco Use   Smoking Status Never   Smokeless Tobacco Never     Social History     Substance and Sexual Activity   Alcohol Use Not Currently     Social History     Substance and Sexual Activity   Drug Use Yes    Types: Marijuana (Ofelia Cho), Cocaine    Comment: + Cocaine 2021 also, see Care Everywhere UDS     2022: EtOH negative; UDS positive for cocaine and fentanyl          LEGAL HISTORY:   HISTORY OF INCARCERATION: [] Yes [] No unknown    Family History:       Problem Relation Age of Onset    Diabetes Mother     Stroke Mother     Diabetes Father     Diabetes Sister     Diabetes Brother     Other Son         GSW    No Known Problems Sister        Psychiatric Family History  Patient denies psychiatric family history.      Suicides in family: [] Yes [x] No    Substance use in family: [] Yes [x] No         PHYSICAL EXAM:  Vitals:  /82   Pulse (!) 109   Temp 97.9 °F (36.6 °C) (Oral)   Resp 14   Ht 5' 5\" (1.651 m)   Wt 207 lb (93.9 kg)   LMP  (LMP Unknown)   PF (!) 16 L/min   BMI 34.45 kg/m²     LABS:  Labs reviewed: [x] Yes  Last EKG in EMR reviewed: [x] Yes          Review of Systems Constitutional: Negative for chills and weight loss. HENT: Negative for ear pain and nosebleeds. Eyes: Negative for blurred vision and photophobia. Respiratory: Negative for cough, shortness of breath and wheezing. Cardiovascular: Negative for chest pain and palpitations. Gastrointestinal: Negative for abdominal pain, diarrhea and vomiting. Genitourinary: Negative for dysuria and urgency. Musculoskeletal: Negative for falls and joint pain. Skin: Negative for itching and rash. Neurological: Negative for tremors, seizures and weakness. Endo/Heme/Allergies: Does not bruise/bleed easily. Pain Scale (0 -10): 0    Physical Exam:   Constitutional:  Appears well-developed and well-nourished, no acute distress. HENT:   Head: Normocephalic and atraumatic. Eyes: Conjunctivae are normal. Right eye exhibits no discharge. Left eye exhibits no discharge. No scleral icterus. Neck: Normal range of motion. Neck supple. Pulmonary/Chest:  No respiratory distress or accessory muscle use, no wheezing. Cardiac: Regular rate and rhythm. Abdominal: Soft. Non-tender. Exhibits no distension. Musculoskeletal: Normal range of motion. Exhibits no edema. Neurological: cranial nerves II-XII grossly in tact, normal gait and station. Skin: Skin is warm and dry. Patient is not diaphoretic. No erythema. Mental Status Examination:    Level of consciousness: Somnolent  Appearance:  Appropriate attire, resting in bed, fair grooming   Behavior/Motor: Approachable, no psychomotor abnormalities noted  Attitude toward examiner:  uncooperative, inattentive, poor eye contact  Speech: Normal rate, decreased volume, dysarthric, and irritable tone.   Mood: Irritable  Affect: Mood congruent  Thought processes: denies suicidal ideation              Denies homicidal ideations               Endorses recent hallucinations              Endorses delusions              Endorses paranoia  Cognition:  Oriented to self, and general situation  Concentration: Clinically adequate  Memory: Intact  Insight & Judgment: Poor         DSM-5 Diagnosis    Principal Problem: Unspecified psychosis not due to a substance or known physiological condition ([address]  Cocaine use disorder  Fentanyl use disorder      Psychosocial and Contextual factors:  Financial   Occupational   Relationship   Legal X  Living situation   Educational X    Past Medical History:   Diagnosis Date    Acid reflux 5/29/2017    Acute cystitis with hematuria 10/11/2016    Acute non-recurrent maxillary sinusitis 11/28/2017    Asthma     Bipolar 1 disorder ([address] 1/4/2018    Bipolar disorder, mixed ([address]     BMI 34.0-34.9,adult 11/28/2017    Cannabis use disorder, severe, dependence ([address] 12/19/2017    Cerebrovascular accident (CVA) ([address] 6/14/2017    Chest pain 11/5/2016    Chronic renal insufficiency     Cocaine abuse ([address] 1/5/2018    COVID-19 virus RNA test result unknown     DDD (degenerative disc disease), cervical     Diabetes mellitus ([address]     Dizziness 6/13/2017    Fibromyalgia     History of stroke 9/9/2017    Homicidal ideation 11/6/2017    Hyperglycemia     Hypertension     Hypotension 1/18/2019    IDDM (insulin dependent diabetes mellitus) 12/21/2015    Lupus ([address]     Migraine     Neuropathy     Neuropathy     Polysubstance abuse ([address] 9/17/2017    Post traumatic stress disorder (PTSD)     Posttraumatic stress disorder 5/29/2017    Recurrent depression ([address] 5/29/2017    Recurrent major depressive disorder, in partial remission ([address] 11/28/2017    Screening mammogram, encounter for 11/28/2017    Severe recurrent major depression with psychotic features ([address] 12/19/2017    Severe recurrent major depression without psychotic features ([address] 12/19/2017    Stroke (cerebrum) ([address] 6/14/2017    Stroke ([address]     per chart notes    Suicidal ideation     Suicidal intent 3/10/2017    Vitamin D deficiency 11/28/2017    White matter changes 6/13/2017        HANDOFF  Continue inpatient psychiatric treatment. Home medications reviewed/verified  Problem list updated. Medications as determined by attending physician  Encourage participation in groups and milieu. Probable discharge is to be determined by MD  Follow-up daily while inpatient. CONSULTS [x] Yes [] No  Internal medicine for medical H & P     Risk Management: close watch per standard protocol    Psychotherapy: participation in milieu and group and individual sessions with Attending Physician,  and Physician Assistant/CNP    Estimated length of stay:  2-14 days    GENERAL PATIENT/FAMILY EDUCATION  Patient will understand basic signs and symptoms, patient will understand benefits/risks and potential side effects from proposed medications, and patient will understand their role in recovery. Family is active in patient's care. Patient assets that may be helpful during treatment include: Intent to participate and engage in treatment, sufficient fund of knowledge and intellect to understand and utilize treatments. Goals:    1) Remission of psychosis. 2) Stabilization of symptoms prior to discharge. 3) Establish efficacy and tolerability of medications. Behavioral Services  Medicare Certification     Admission Day 1  I certify that this patient's inpatient psychiatric hospital admission is medically necessary for:    x (1) treatment which could reasonably be expected to improve this patient's condition, or    x (2) diagnostic study or its equivalent. Time Spent: 60 minutes    Jamar Monroy is a 56 y.o. female being evaluated face to face    --LAILA Cadet CNP on 9/23/2022 at 11:57 AM    An electronic signature was used to authenticate this note. I independently saw and evaluated the patient. I reviewed the nurse practioner's documentation above. Any additional comments or changes to the    documentation are stated below otherwise agree with assessment.       The patient is known to me from the consult service. She continues to confabulate. She is irritable. She wants to be out of the hospital.  I have reviewed her CT brain scan and noted that she has left frontal encephalomalacia. The patient has been a cocaine user. She she also has increase in her ALP levels. Internal medicine was on board      PLAN  Medications as noted above  Attempt to develop insight  Expected Length of Stay is 2-9 days. Psycho-education conducted. Supportive Therapy conducted.   Follow-up daily while on inpatient unit    Electronically signed by Brian Cook MD on 9/23/22 at 5:20 PM EDT

## 2022-09-23 NOTE — PLAN OF CARE
Problem: Anxiety  Goal: Will report anxiety at manageable levels  Description: INTERVENTIONS:  1. Administer medication as ordered  2. Teach and rehearse alternative coping skills  3. Provide emotional support with 1:1 interaction with staff  9/23/2022 1358 by Kat Onofre  Outcome: Progressing  Pt. Relates to anxiety on the unit. Some irritability noted. Pt. Does ask for help getting up from toilet. Medication compliant. Problem: Confusion  Goal: Confusion, delirium, dementia, or psychosis is improved or at baseline  Description: INTERVENTIONS:  1. Assess for possible contributors to thought disturbance, including medications, impaired vision or hearing, underlying metabolic abnormalities, dehydration, psychiatric diagnoses, and notify attending LIP  2. [place] high risk fall precautions, as indicated  3. Provide frequent short contacts to provide reality reorientation, refocusing and direction  4. Decrease environmental stimuli, including noise as appropriate  5. Monitor and intervene to maintain adequate nutrition, hydration, elimination, sleep and activity  6. If unable to ensure safety without constant attention obtain sitter and review sitter guidelines with assigned personnel  7. Initiate Psychosocial CNS and Spiritual Care consult, as indicated  9/23/2022 1358 by Kat Onofre  Outcome: Progressing  Pt. Has been oriented x 4. Out in dayroom and social with select peers. Pt denies suicidal thoughts. Pt. Remains safe on the unit. Q 15 minute checks for safety maintained. Problem: Safety - Adult  Goal: Free from fall injury  9/23/2022 1358 by Kat Onofre  Outcome: Progressing  Pt remains free of falls. Pt. Remains safe on the unit.  Q 15 minute checks for safety maintained. '

## 2022-09-23 NOTE — CARE COORDINATION
BHI Biopsychosocial Assessment    Current Level of Psychosocial Functioning      Independent   Dependent    Minimal Assist      Comments:   Patient has a provisional diagnosis of                     , which is the condition established to be chiefly responsible for the admission until more information is gathered during assessments, treatment teams, and 1:1 meetings. Patient documentation in Epic provides prior diagnoses of    Psychosocial High-Risk Factors (check all that apply)     Unable to obtain meds   Chronic illness/pain    Not taking medications  Substance abuse   Lack of Family Support   Addictive Behaviors  Financial stress   Isolation  Inadequate Community Resources  Intentional suicide  Suicidal ideations   Homicidal ideations  Self-mutilation  Victim of crime   Legal issues  Developmental Delay  Unable to manage personal needs    Age 72 or older   Homeless  No transportation   Readmission within 30 days   Unemployment  Traumatic Event    Psychiatric Advanced Directives:   Nothing reported     Family to Vincent Askew in Treatment:  Daughter Dino Renae at [phone]     Sexual Orientation:        Patient Strengths:       Patient Barriers:       Trauma and Abuse/History of Violence:     Opiate/AOD Referral and/or Education Provided:        CMHC/mental health history:        Plan of Care:  Medication management, group/individual therapies, family meetings, psychoeducation, 1:1 counseling, treatment team meetings to assist with stabilization      Initial Discharge Plan:  Stabilize mood and medications; explore social support systems within community to increase socialization; provide 24/7 local and national hotline numbers for additional support; safety plan to be completed; disclose/discuss suicidal ideas will improve, decrease depressive symptoms, absence of self-harm.     Discharge Location:     Clinical Summary:

## 2022-09-23 NOTE — GROUP NOTE
Group Therapy Note    Date: 9/23/2022    Group Start Time: 1100  Group End Time: 8688  Group Topic: Cognitive Skills    DANIELLE Barone, CTRS    Cognitive Skills Group Note        Date: September 23, 2022 Start Time: 11am  End Time:  1135am      Number of Participants in Group & Unit Census:  8/14    Topic: cognitive skills     Goal of Group: to explore positive coping skills/ improve decision making skills       Comments:     Patient did not participate in Cognitive Skills group, despite staff encouragement and explanation of benefits. Patient remain seclusive to self. Q15 minute safety checks maintained for patient safety and will continue to encourage patient to attend unit programming.               Signature:  Sagrario Sauceda

## 2022-09-23 NOTE — CARE COORDINATION
BHI Biopsychosocial Assessment    Current Level of Psychosocial Functioning     Independent   Dependent  XX  Minimal Assist       Psychosocial High Risk Factors (check all that apply)    Unable to obtain meds   Chronic illness/pain    Substance abuse XX  Lack of Family Support   Financial stress   Isolation   Inadequate Community Resources  Suicide attempt(s)  Not taking medications   Victim of crime   Developmental Delay  Unable to manage personal needs  XX  Age 72 or older   Homeless  No transportation   Readmission within 30 days  Unemployment  Traumatic Event      Psychiatric Advanced Directives: WILLIAM    Family to Vincent Askew in Treatment: Daughter Mildred Jensen [phone]    Sexual Orientation:  NA    Patient Strengths: Patient has supportive daughter. Patient has housing     Patient Barriers: Patient unwilling to participate in assessment. WILLIAM      Opiate Education Provided:  WILLIAM    CMHC/mental health history: Patient is unwilling to participate in assessment. Unknown if patient is linked     Plan of Care   medication management, group/individual therapies, family meetings, psycho -education, treatment team meetings to assist with stabilization    Initial Discharge Plan:  Per review of Epic, patient daughter is finding out if patient can return to her apartment at [facility]. Clinical Summary:  Patient is a 56 y.o. female admitted to [facility] on a pink slip from medical floor. [place] slip reports patient is lacking insight and incapable of decision making. Per review of chart patient was found unresponsive in home for an unknown amount of time and brought to the hospital. SW on medical was working with daughter Mildred Jensen, to possibly establish guardianship. This SW tried to engage patient in assessment and patient stated \"I do not want to do your assessment. I do not want to talk about this place or being here, you can ask me whatever you want but I will be moving out of here soon. \".     SW will continue to engage patient in treatment and discharge planning as symptoms improve.

## 2022-09-23 NOTE — GROUP NOTE
Group Therapy Note    Date: 9/23/2022    Group Start Time: 1430  Group End Time: 7624  Group Topic: Healthy Living/Wellness    DANIELLE MARROQUIN    Clarissaadrianne Bowers        Group Therapy Note    Attendees: 8/14     Health/Wellness Group Note        Date: September 23, 2022 Start Time: 2:30pm  End Time:  4981      Number of Participants in Group & Unit Census:  8/14    Topic: Health/Wellness    Goal of Group:Watch REANNA talk on mental illness and discuss      Comments:     Patient did not participate in Health/Wellness group, despite staff encouragement and explanation of benefits. Patient remain seclusive to self. Q15 minute safety checks maintained for patient safety and will continue to encourage patient to attend unit programming.

## 2022-09-23 NOTE — GROUP NOTE
Problem: Adult Inpatient Plan of Care  Goal: Absence of Hospital-Acquired Illness or Injury  Outcome: Improving  Intervention: Identify and Manage Fall Risk  Recent Flowsheet Documentation  Taken 12/14/2021 1609 by Ursula Zaragoza RN  Safety Promotion/Fall Prevention:   activity supervised   assistive device/personal items within reach   nonskid shoes/slippers when out of bed  Intervention: Prevent Infection  Recent Flowsheet Documentation  Taken 12/14/2021 1609 by Ursula Zaragoza RN  Infection Prevention: hand hygiene promoted     Problem: Adult Inpatient Plan of Care  Goal: Optimal Comfort and Wellbeing  Outcome: Improving     Problem: Pain Acute  Goal: Acceptable Pain Control and Functional Ability  Outcome: Improving     Problem: Postoperative Urinary Retention (Surgery Nonspecified)  Goal: Effective Urinary Elimination  Outcome: Improving   Pt states that pain is always 10/10 he never gets relief from his pain.  Noted that pt slept for a portion of shift.   Pt tolerating clear liquid diet without nausea or vomiting.  Pt wanting diet to be advanced.      IR placed tube was flushed with 10 ml normal saline.       Group Therapy Note    Date: 9/22/2022    Group Start Time: 2030  Group End Time: 2100  Group Topic: Relaxation    [facility]    Efraín Nixon RN        Group Therapy Note    Attendees: 14/14       Status After Intervention:  Improved    Participation Level:  Active Listener    Participation Quality: Appropriate and Attentive      Speech:  normal      Thought Process/Content: Logical      Affective Functioning: Congruent      Level of consciousness:  Alert and Oriented x4      Response to Learning: Able to verbalize current knowledge/experience      Endings: None Reported    Modes of Intervention: Education, Support, Socialization, Problem-solving, and Activity      Discipline Responsible: Registered Nurse      Signature:  Efraín Nixon RN

## 2022-09-23 NOTE — PLAN OF CARE
Problem: Anxiety  Goal: Will report anxiety at manageable levels  Description: INTERVENTIONS:  1. Administer medication as ordered  2. Teach and rehearse alternative coping skills  3. Provide emotional support with 1:1 interaction with staff  9/22/2022 2127 by Jimmie Alicea RN  Outcome: Progressing  9/22/2022 2126 by Jimmie Alicea RN  Outcome: Progressing     Problem: Confusion  Goal: Confusion, delirium, dementia, or psychosis is improved or at baseline  Description: INTERVENTIONS:  1. Assess for possible contributors to thought disturbance, including medications, impaired vision or hearing, underlying metabolic abnormalities, dehydration, psychiatric diagnoses, and notify attending LIP  2. [place] high risk fall precautions, as indicated  3. Provide frequent short contacts to provide reality reorientation, refocusing and direction  4. Decrease environmental stimuli, including noise as appropriate  5. Monitor and intervene to maintain adequate nutrition, hydration, elimination, sleep and activity  6. If unable to ensure safety without constant attention obtain sitter and review sitter guidelines with assigned personnel  7. Initiate Psychosocial CNS and Spiritual Care consult, as indicated  9/22/2022 2127 by Jimmie Alicea RN  Outcome: Progressing  9/22/2022 2126 by Jimmie Alicea RN  Outcome: Not Progressing     Problem: Safety - Adult  Goal: Free from fall injury  9/22/2022 2127 by Jimmie Alicea RN  Outcome: Progressing  9/22/2022 2126 by Jimmie Alicea RN  Outcome: Progressing     Problem: Confusion  Goal: Confusion, delirium, dementia, or psychosis is improved or at baseline  Description: INTERVENTIONS:  1. Assess for possible contributors to thought disturbance, including medications, impaired vision or hearing, underlying metabolic abnormalities, dehydration, psychiatric diagnoses, and notify attending LIP  2. [place] high risk fall precautions, as indicated  3. Provide frequent short contacts to provide reality reorientation, refocusing and direction  4. Decrease environmental stimuli, including noise as appropriate  5. Monitor and intervene to maintain adequate nutrition, hydration, elimination, sleep and activity  6. If unable to ensure safety without constant attention obtain sitter and review sitter guidelines with assigned personnel  7.  Initiate Psychosocial CNS and Spiritual Care consult, as indicated  9/22/2022 2127 by Minh Black RN  Outcome: Progressing  9/22/2022 2126 by Minh Black RN  Outcome: Not Progressing

## 2022-09-24 LAB
GLUCOSE BLD-MCNC: 177 MG/DL (ref 65–105)
GLUCOSE BLD-MCNC: 185 MG/DL (ref 65–105)
GLUCOSE BLD-MCNC: 221 MG/DL (ref 65–105)
GLUCOSE BLD-MCNC: 241 MG/DL (ref 65–105)
GLUCOSE BLD-MCNC: 248 MG/DL (ref 65–105)

## 2022-09-24 PROCEDURE — 97535 SELF CARE MNGMENT TRAINING: CPT

## 2022-09-24 PROCEDURE — 99232 SBSQ HOSP IP/OBS MODERATE 35: CPT | Performed by: PSYCHIATRY & NEUROLOGY

## 2022-09-24 PROCEDURE — 6370000000 HC RX 637 (ALT 250 FOR IP): Performed by: INTERNAL MEDICINE

## 2022-09-24 PROCEDURE — 6370000000 HC RX 637 (ALT 250 FOR IP): Performed by: PSYCHIATRY & NEUROLOGY

## 2022-09-24 PROCEDURE — 99232 SBSQ HOSP IP/OBS MODERATE 35: CPT | Performed by: INTERNAL MEDICINE

## 2022-09-24 PROCEDURE — 1240000000 HC EMOTIONAL WELLNESS R&B

## 2022-09-24 PROCEDURE — 97167 OT EVAL HIGH COMPLEX 60 MIN: CPT

## 2022-09-24 PROCEDURE — APPSS30 APP SPLIT SHARED TIME 16-30 MINUTES: Performed by: NURSE PRACTITIONER

## 2022-09-24 PROCEDURE — 82947 ASSAY GLUCOSE BLOOD QUANT: CPT

## 2022-09-24 RX ORDER — INSULIN GLARGINE 100 [IU]/ML
17 INJECTION, SOLUTION SUBCUTANEOUS 2 TIMES DAILY
Status: DISCONTINUED | OUTPATIENT
Start: 2022-09-24 | End: 2022-10-04

## 2022-09-24 RX ADMIN — INSULIN GLARGINE 15 UNITS: 100 INJECTION, SOLUTION SUBCUTANEOUS at 08:08

## 2022-09-24 RX ADMIN — OLANZAPINE 15 MG: 10 TABLET, ORALLY DISINTEGRATING ORAL at 23:52

## 2022-09-24 RX ADMIN — IBUPROFEN 400 MG: 400 TABLET ORAL at 08:08

## 2022-09-24 RX ADMIN — PREGABALIN 75 MG: 75 CAPSULE ORAL at 23:52

## 2022-09-24 RX ADMIN — INSULIN LISPRO 1 UNITS: 100 INJECTION, SOLUTION INTRAVENOUS; SUBCUTANEOUS at 17:10

## 2022-09-24 RX ADMIN — METFORMIN HYDROCHLORIDE 1000 MG: 1000 TABLET ORAL at 17:10

## 2022-09-24 RX ADMIN — PANTOPRAZOLE SODIUM 40 MG: 40 TABLET, DELAYED RELEASE ORAL at 08:08

## 2022-09-24 RX ADMIN — ACETAMINOPHEN 650 MG: 325 TABLET, FILM COATED ORAL at 11:28

## 2022-09-24 RX ADMIN — PREGABALIN 75 MG: 75 CAPSULE ORAL at 08:08

## 2022-09-24 RX ADMIN — METFORMIN HYDROCHLORIDE 1000 MG: 1000 TABLET ORAL at 08:08

## 2022-09-24 ASSESSMENT — PAIN DESCRIPTION - LOCATION
LOCATION: GENERALIZED
LOCATION: GENERALIZED

## 2022-09-24 ASSESSMENT — PAIN SCALES - GENERAL
PAINLEVEL_OUTOF10: 0
PAINLEVEL_OUTOF10: 3
PAINLEVEL_OUTOF10: 2
PAINLEVEL_OUTOF10: 5

## 2022-09-24 ASSESSMENT — PAIN DESCRIPTION - DESCRIPTORS: DESCRIPTORS: ACHING;DISCOMFORT

## 2022-09-24 NOTE — GROUP NOTE
Group Therapy Note    Date: 9/24/2022    Group Start Time: 1400  Group End Time: 3782  Group Topic: Music Therapy    [facility]    Magdalene Hernandez [facility] Group Note        Date: 9/24/2022   Start Time: 2pm  End Time: 3:05pm      Number of Participants in Group & Unit Census:  11/15    Topic: Patient's Goal:  Patients shared preferred music and dedicated songs to people that have had an impact on their lives. Goal of Group:Goals to reflect on relationships; Increase sense of community; Increase self-expression; Normalization of the environment;       Comments:     Patient did not participate in [address] therapy group, despite staff encouragement and explanation of benefits. Patient remain seclusive to self. Q15 minute safety checks maintained for patient safety and will continue to encourage patient to attend unit programming.

## 2022-09-24 NOTE — BH NOTE
Patient sat in wheelchair at desk yelling \" I need a snack now. MY blood sugar is low. \" Writer asked pt if writer could check BS. Patient stated \"that nurse just checked it. \" Writer asked the staff member she indicated what the blood sugar had been and nurse stated \"I didn't check it. \" Writer offered to check BS and pt refused. Pt said, \" I'm just Lonita Lesser tell the doctor you are refusing to give me snacks. \" Pt then approached another staff member.

## 2022-09-24 NOTE — PROGRESS NOTES
Daily Progress Note  9/24/2022    Patient Name: Teri Deleon: Acute psychosis         SUBJECTIVE:   Patient was seen for follow-up assessment today. Patient has been medication adherent with psychiatric medications and behaviorally in control. She has not received any emergency medications in the last 24 hours. Nursing staff report patient has been selectively social in the day area but frequently irritable with staff. She has not made any statements regarding self-harm and denies perceptual disturbances including hallucinations. She has endorsed some moderate anxiety. She has attended groups intermittently. She is alert and oriented x4. Patient was seated in the day area in her wheelchair prior to assessment and she is observed talking to occupational therapist. Patient rolls wheelchair away from OT. Writer attempted to engage patient in conversation and she dismissed writer's attempt at assessment. She stated she had no interest in talking with writer. Attempt at assessment is therefore discontinued.     Appetite:  [x] Normal/Adequate/Unchanged  [] Increased  [] Decreased      Sleep:       [x] Normal/Adequate/Unchanged  [] Fair  [] Poor      Group Attendance:   [] Yes  [x] Selectively    [] No    Medication Side Effects: Denies         Mental Status Exam  Level of consciousness: Alert and awake   Appearance: Appropriate attire for setting, seated in chair, with fair  grooming and hygiene   Behavior/Motor: Guarded, evasive, calm  Attitude toward examiner: uncooperative, inattentive, poor eye contact   Speech: spontaneous, normal rate, and normal volume, irritable tone  Mood: Irritable  Affect: Blunted  Thought processes: linear and coherent   Thought content: Denies homicidal ideation  Suicidal Ideation: Denies  Delusions: Paranoia  Perceptual Disturbance: patient is not observed responding to internal stimuli; denies AVH  Cognition: Oriented to self, location, time, and situation  Memory: impaired   Insight & Judgement: poor     Data   height is 5' 5\" (1.651 m) and weight is 207 lb (93.9 kg). Her temporal temperature is 97.7 °F (36.5 °C). Her blood pressure is 100/51 (abnormal) and her pulse is 92. Her respiration is 14.    Labs:   Admission on 09/22/2022   Component Date Value Ref Range Status    POC Glucose 09/22/2022 135 (A)  65 - 105 mg/dL Final    POC Glucose 09/23/2022 168 (A)  65 - 105 mg/dL Final    WBC 09/23/2022 5.3  3.5 - 11.0 k/uL Final    RBC 09/23/2022 2.72 (A)  4.0 - 5.2 m/uL Final    Hemoglobin 09/23/2022 8.6 (A)  12.0 - 16.0 g/dL Final    Hematocrit 09/23/2022 26.8 (A)  36 - 46 % Final    MCV 09/23/2022 98.8  80 - 100 fL Final    MCH 09/23/2022 31.8  26 - 34 pg Final    MCHC 09/23/2022 32.2  31 - 37 g/dL Final    RDW 09/23/2022 14.9  11.5 - 14.9 % Final    Platelets 15/44/4454 434  150 - 450 k/uL Final    MPV 09/23/2022 7.8  6.0 - 12.0 fL Final    Glucose 09/23/2022 315 (A)  70 - 99 mg/dL Final    BUN 09/23/2022 13  6 - 20 mg/dL Final    Creatinine 09/23/2022 0.78  0.50 - 0.90 mg/dL Final    Calcium 09/23/2022 8.7  8.6 - 10.4 mg/dL Final    Sodium 09/23/2022 135  135 - 144 mmol/L Final    Potassium 09/23/2022 4.8  3.7 - 5.3 mmol/L Final    Chloride 09/23/2022 101  98 - 107 mmol/L Final    CO2 09/23/2022 24  20 - 31 mmol/L Final    Anion Gap 09/23/2022 10  9 - 17 mmol/L Final    Alkaline Phosphatase 09/23/2022 243 (A)  35 - 104 U/L Final    ALT 09/23/2022 10  5 - 33 U/L Final    AST 09/23/2022 8  <32 U/L Final    Total Bilirubin 09/23/2022 0.2 (A)  0.3 - 1.2 mg/dL Final    Total Protein 09/23/2022 6.8  6.4 - 8.3 g/dL Final    Albumin 09/23/2022 2.7 (A)  3.5 - 5.2 g/dL Final    GFR Non- 09/23/2022 >60  >60 mL/min Final    GFR  09/23/2022 >60  >60 mL/min Final    GFR Comment 09/23/2022        Final    Comment: Average GFR for 52-63 years old:   80 mL/min/1.73sq m  Chronic Kidney Disease:   <60 mL/min/1.73sq m  Kidney failure:   <15 mL/min/1.73sq m              eGFR calculated using average adult body mass. Additional eGFR calculator available at:        [employer]            POC Glucose 09/23/2022 222 (A)  65 - 105 mg/dL Final    POC Glucose 09/23/2022 286 (A)  65 - 105 mg/dL Final    POC Glucose 09/23/2022 237 (A)  65 - 105 mg/dL Final    POC Glucose 09/24/2022 185 (A)  65 - 105 mg/dL Final    POC Glucose 09/24/2022 177 (A)  65 - 105 mg/dL Final         Reviewed patient's current plan of care and vital signs with nursing staff.     Labs reviewed: [x] Yes  Last EKG in EMR reviewed: [x] Yes    Medications  Current Facility-Administered Medications: atorvastatin (LIPITOR) tablet 40 mg, 40 mg, Oral, Nightly  metFORMIN (GLUCOPHAGE) tablet 1,000 mg, 1,000 mg, Oral, BID WC  glucagon (rDNA) injection 1 mg, 1 mg, SubCUTAneous, PRN  glucose chewable tablet 16 g, 4 tablet, Oral, PRN  OLANZapine zydis (ZYPREXA) disintegrating tablet 15 mg, 15 mg, Oral, Nightly  pantoprazole (PROTONIX) tablet 40 mg, 40 mg, Oral, BID  pregabalin (LYRICA) capsule 75 mg, 75 mg, Oral, BID  acetaminophen (TYLENOL) tablet 650 mg, 650 mg, Oral, Q6H PRN  ibuprofen (ADVIL;MOTRIN) tablet 400 mg, 400 mg, Oral, Q6H PRN  hydrOXYzine HCl (ATARAX) tablet 50 mg, 50 mg, Oral, TID PRN  traZODone (DESYREL) tablet 50 mg, 50 mg, Oral, Nightly PRN  polyethylene glycol (GLYCOLAX) packet 17 g, 17 g, Oral, Daily PRN  aluminum & magnesium hydroxide-simethicone (MAALOX) 200-200-20 MG/5ML suspension 30 mL, 30 mL, Oral, Q6H PRN  nicotine polacrilex (NICORETTE) gum 2 mg, 2 mg, Oral, Q2H PRN  haloperidol lactate (HALDOL) injection 5 mg, 5 mg, IntraMUSCular, Q6H PRN **AND** LORazepam (ATIVAN) injection 2 mg, 2 mg, IntraMUSCular, Q6H PRN **AND** diphenhydrAMINE (BENADRYL) injection 50 mg, 50 mg, IntraMUSCular, Q6H PRN  insulin glargine (LANTUS) injection vial 15 Units, 15 Units, SubCUTAneous, BID  insulin lispro (HUMALOG) injection vial 0-4 Units, 0-4 Units, SubCUTAneous, TID WC  insulin lispro (HUMALOG) injection vial 0-4 Units, 0-4 Units, SubCUTAneous, Nightly    ASSESSMENT  Unspecified psychosis not due to a substance or known physiological condition [facility])         HANDOFF  Patient symptoms: Show no change  Medications as determined by attending physician  Encourage participation in groups and milieu. Probable discharge is to be determined by MD    Electronically signed by LAILA Lombardo CNP on 9/24/2022 at 3:07 PM    **This report has been created using voice recognition software. It may contain minor errors which are inherent in voice recognition technology. **    I independently saw and evaluated the patient. I reviewed the nurse practioner's documentation above. Any additional comments or changes to the    documentation are stated below otherwise agree with assessment. The patient has been mobilizing chair. She was initially discharged focused but later did begin to cry about her diagnosis. The patient appears to have improved insight of her illness. She recognizes that she does not recall a lot of things and does not know what date it is. We discussed that her cognition seems to be slightly better today. We discussed that she is not ready for discharge as she has been able to function in the community with her current mental status    PLAN  Medications as noted above  Attempt to develop insight  Expected Length of Stay is 5-9 days. Psycho-education conducted. Supportive Therapy conducted.   Follow-up daily while on inpatient unit    Electronically signed by Darin Ruiz MD on 9/24/22 at 8:52 PM EDT

## 2022-09-24 NOTE — GROUP NOTE
Group Therapy Note    Date: 9/23/2022    Group Start Time: 2005  Group End Time: 2030  Group Topic: Wrap-Up    [facility]    Mine Warren RN      Group Therapy Note    Attendees: 9/11       Patient's Goal:    1. To be able to reflect on daily unit activities/experiences. 2.  To review accomplished daily goals and be encouraged to set new goals for the next day. 3.  To improve interpersonal interaction through socialization. Notes:  Pt attended and actively participated in Wrap-Up/Goal Review group this evening. Status After Intervention:  Improved     Participation Level:  Active Listener and Interactive     Participation Quality: Appropriate, Attentive and Sharing     Speech:  normal     Thought Process/Content: Logical     Affective Functioning: Congruent     Mood: euthymic     Level of consciousness:  Alert, Oriented x4 and Attentive     Response to Learning: Able to verbalize current knowledge/experience,  Progressing to goal     Endings: None Reported     Modes of Intervention: Education, Support and Socialization     Discipline Responsible: Registered Nurse        Signature:  Mine Warren RN

## 2022-09-24 NOTE — PROGRESS NOTES
[facility] Internal Medicine    HISTORY AND PHYSICAL EXAMINATION/ CONSULT NOTE            Date:   9/24/2022  Patient name:  Hilaria Desai  Date of admission:  9/22/2022  3:40 PM  MRN:   [id number]  Account:  [de-identified]  YOB: 1965  PCP:    Antwon Tracy MD  Room:   [facility]0/0220-01  Code Status:    Full Code    Physician Requesting Consult: David Mendez MD    Reason for Consult: History and physical, medical management    Chief Complaint:     No chief complaint on file. Medical management    History Obtained From:     Patient, EMR, nursing staff    History of Present Illness:     77-year-old female admitted for acute psychotic state    She was initially admitted to medical unit after being found unresponsive at her home, treated for DKA with IV insulin, acute renal failure with substantial hydration, and hypovolemic shock requiring Levophed, severe hypernatremia >170, concern for GI bleed status post EGD which showed multiple duodenal ulcers treated with PPI.   Patient remained confused and delusional, CT head negative, MRI could not be done due to lack of patient cooperation      Medical history includes asthma, hypertension, stroke, type 2 diabetes, polysubstance abuse, recent ischemic stroke  Past Medical History:     Past Medical History:   Diagnosis Date    Acid reflux 5/29/2017    Acute cystitis with hematuria 10/11/2016    Acute non-recurrent maxillary sinusitis 11/28/2017    Asthma     Bipolar 1 disorder ([address] 1/4/2018    Bipolar disorder, mixed ([address]     BMI 34.0-34.9,adult 11/28/2017    Cannabis use disorder, severe, dependence ([address] 12/19/2017    Cerebrovascular accident (CVA) ([address] 6/14/2017    Chest pain 11/5/2016    Chronic renal insufficiency     Cocaine abuse ([address] 1/5/2018    COVID-19 virus RNA test result unknown     DDD (degenerative disc disease), cervical     Diabetes mellitus ([address]     Dizziness 6/13/2017    Fibromyalgia     History of stroke 9/9/2017    Homicidal ideation 11/6/2017    Hyperglycemia     Hypertension     Hypotension 1/18/2019    IDDM (insulin dependent diabetes mellitus) 12/21/2015    Lupus ([address]     Migraine     Neuropathy     Neuropathy     Polysubstance abuse ([address] 9/17/2017    Post traumatic stress disorder (PTSD)     Posttraumatic stress disorder 5/29/2017    Recurrent depression ([address] 5/29/2017    Recurrent major depressive disorder, in partial remission ([address] 11/28/2017    Screening mammogram, encounter for 11/28/2017    Severe recurrent major depression with psychotic features ([address] 12/19/2017    Severe recurrent major depression without psychotic features ([address] 12/19/2017    Stroke (cerebrum) ([address] 6/14/2017    Stroke ([address]     per chart notes    Suicidal ideation     Suicidal intent 3/10/2017    Vitamin D deficiency 11/28/2017    White matter changes 6/13/2017        Past Surgical History:     Past Surgical History:   Procedure Laterality Date    ABDOMEN SURGERY      drain tube    ABSCESS DRAINAGE      right buttock    CATARACT REMOVAL WITH IMPLANT Bilateral     CHEST TUBE INSERTION      FINGER AMPUTATION Left 03/13/2021    LEFT RING FINER AMPUTATION performed by Lee Hayes MD at [address] [facility] Right 10/11/2021    AMPUTATION RIGHT INDEX FINGER performed by Lee Hayes MD at [address] Right 1/27/2022    FOOT ABSCESS INCISION AND DRAINAGE  (PULSE LAVAGE, CULTURE SWABS) performed by Barbara Gillette DPM at [place] [address] Right 01/27/2022    FOOT ABSCESS INCISION AND DRAINAGE (PULSE LAVAGE, CULTURE SWABS) - Right    HAND SURGERY Right 09/14/2021    I&D INDEX FINGER performed by Lee Hayes MD at [address] Right 09/15/2021    I&D INDEX FINGER performed by Lee Hayes MD at [address]  2/3/2022         LASIK Bilateral     UPPER GASTROINTESTINAL ENDOSCOPY N/A 9/16/2022    EGD BIOPSY performed by Bradly Garcia MD at [facility]        Medications Prior to Admission:     Prior to Admission medications    Medication Sig Start Date End Date Taking? Authorizing Provider   insulin glargine (LANTUS SOLOSTAR) 100 UNIT/ML injection pen Inject 30 Units into the skin 2 times daily 4/22/22   Romina Beach MD   atorvastatin (LIPITOR) 40 MG tablet Take 1 tablet by mouth nightly 4/22/22 9/8/22  Romina Beach MD   acetaminophen (TYLENOL) 500 MG tablet Take 2 tablets by mouth 3 times daily as needed for Pain  Patient not taking: Reported on 8/8/2022 3/15/22   Romina Beach MD   fluticasone (FLONASE) 50 MCG/ACT nasal spray 1 spray by Each Nostril route daily 3/15/22   Romina Beach MD   metFORMIN (GLUCOPHAGE) 1000 MG tablet Take 1 tablet by mouth 2 times daily (with meals) 3/15/22   Romina Beach MD   mirtazapine (REMERON) 7.5 MG tablet Take 1 tablet by mouth nightly 3/15/22   Romina Beach MD   traZODone (DESYREL) 150 MG tablet Take 1 tablet by mouth nightly 3/15/22   Romina Beach MD   venlafaxine (EFFEXOR XR) 150 MG extended release capsule Take 1 capsule by mouth daily (with breakfast) 3/15/22   Romina Beach MD   Alcohol Swabs 70 % PADS Use 1 swab 3 times daily as needed 3/15/22   Romina Beach MD   blood glucose monitor strips Test 3 times a day & as needed for symptoms of irregular blood glucose. Dispense sufficient amount for indicated testing frequency plus additional to accommodate PRN testing needs.  3/15/22   Romina Beach MD   Lancets MISC 1 each by Does not apply route 3 times daily 3/15/22   Romina Beach MD   Insulin Pen Needle ([employer] PEN NEEDLES 31G) 31G X 8 MM MISC 1 each by Does not apply route daily 3/15/22   Romina Beach MD   FREESTYLE LITE strip 1 each by Does not apply route 3 times daily 3/15/22   Romina Beach MD   budesonide-formoterol (SYMBICORT) 80-4.5 MCG/ACT AERO Inhale 2 puffs into the lungs 2 times daily 3/15/22   Romina Beach MD   albuterol sulfate  (90 Base) MCG/ACT inhaler Inhale 2 puffs into the lungs every 4 hours as needed for Wheezing 3/15/22 4/15/22  Jackey Cooks, MD   mupirocin (BACTROBAN) 2 % ointment Apply topically 3 times daily Apply topically to right foot wound two times a day for wound care. Apply to right foot wound, cover with Kerlix and ace wrap. Historical Provider, MD   Misc. Devices MISC 1 PAIR OF DIABETIC SHOES (1 LEFT/ 1 RIGHT)  1-3 PAIRS OF INSERTS (LEFT/ RIGHT) 2/15/22   Juan Diego Sebastian, DPM   dicyclomine (BENTYL) 10 MG capsule Take 1 capsule by mouth 4 times daily 21   Jackey Cooks, MD        Allergies:     Bactrim [sulfamethoxazole-trimethoprim] and Adhesive tape    Social History:     Tobacco:    reports that she has never smoked. She has never used smokeless tobacco.  Alcohol:      reports that she does not currently use alcohol. Drug Use:  reports current drug use. Drugs: Marijuana (Weed) and Cocaine. Family History:     Family History   Problem Relation Age of Onset    Diabetes Mother     Stroke Mother     Diabetes Father     Diabetes Sister     Diabetes Brother     Other Son         GSW    No Known Problems Sister        Review of Systems:     Positive and Negative as described in HPI. Denies any shortness of breath or cough  Denies chest pain or palpitations  Denies diarrhea vomiting. Reports mild epigastric pain  Denies any new numbness tremors or weakness.     10 point review of systems was negative other than mentioned above    Physical Exam:     BP (!) 100/51   Pulse 92   Temp 97.7 °F (36.5 °C) (Temporal)   Resp 14   Ht 5' 5\" (1.651 m)   Wt 207 lb (93.9 kg)   LMP  (LMP Unknown)   PF (!) 16 L/min   BMI 34.45 kg/m²   Temp (24hrs), Av.9 °F (36.6 °C), Min:97.7 °F (36.5 °C), Max:98.1 °F (36.7 °C)    Recent Labs     22  0747 22  1134 22  1604 22  1702   POCGLU 185* 177* 241* 221*       No intake or output data in the 24 hours ending 22 3964    General Appearance: alert, well appearing, and in no acute distress  Head:  normocephalic, atraumatic. Eye: no icterus, redness, pupils equal and reactive, extraocular eye movements intact, conjunctiva clear  Ear: normal external ear, no discharge, hearing intact  Nose:  no drainage noted  Mouth: mucous membranes moist  Neck: supple, no carotid bruits, thyroid not palpable  Lungs: Bilateral equal air entry, clear to ausculation, no wheezing, rales or rhonchi, normal effort  Cardiovascular: normal rate, regular rhythm, no murmur, gallop, rub. Abdomen: Soft, nontender, nondistended, normal bowel sounds, no hepatomegaly or splenomegaly  Neurologic: difficult to assess, limited pt co-operation. Alert, oriented X 1-2, mobile, power equal all 4 limibs. Normal speech but abnormal content, confabulation present.   No focal neurology otherwise  Skin: No gross lesions, rashes, bruising or bleeding on exposed skin area  Extremities:  No joint swelling, no pedal edema or calf pain with palpation      Investigations:      Laboratory Testing:  Recent Results (from the past 24 hour(s))   POC Glucose Fingerstick    Collection Time: 09/23/22  8:00 PM   Result Value Ref Range    POC Glucose 237 (H) 65 - 105 mg/dL   POC Glucose Fingerstick    Collection Time: 09/24/22  7:47 AM   Result Value Ref Range    POC Glucose 185 (H) 65 - 105 mg/dL   POC Glucose Fingerstick    Collection Time: 09/24/22 11:34 AM   Result Value Ref Range    POC Glucose 177 (H) 65 - 105 mg/dL   POC Glucose Fingerstick    Collection Time: 09/24/22  4:04 PM   Result Value Ref Range    POC Glucose 241 (H) 65 - 105 mg/dL   POC Glucose Fingerstick    Collection Time: 09/24/22  5:02 PM   Result Value Ref Range    POC Glucose 221 (H) 65 - 105 mg/dL       Imaging/Diagonstics:  Recent data reviewed    Assessment :      Primary Problem  Unspecified psychosis not due to a substance or known physiological condition [facility])    Active Hospital Problems    Diagnosis Date Noted    Fentanyl use disorder, mild ([facility] [F11.10] 09/23/2022     Priority: Medium    Unspecified psychosis not due to a substance or known physiological condition ([facility] [F29] 09/22/2022     Priority: Medium    Cocaine use disorder ([facility] [F14.10] 01/05/2018         Plan:     Reason for consult: General medical management     Acute psychosis - management per psych-  DKA- resolved  Type 2 DM- BG lantus, SSI- BG controlled, resume statin, metformin  Acute renal failure- creat normalized  Acute toxic encephalopathy- still encephalopathic  Hypertension- currently off meds- BP controlled  Upper GI bleed, duodenal ulcers-  c/w PPI  Recent stroke- off ASA due to recent GI bleed- Hb 7.6 last, will repeat    Repeat labs ordered, including HIV and hep C per patient request    DVT prophylaxis-not required, patient is mobile    Uncontrolled diabetes increase Lantus to 17 twice a day      Consultations:   Christine Rodriguez MD  9/24/2022  5:54 PM    Copy sent to Daniela Mcmahan MD    Please note that this chart was generated using voice recognition Dragon dictation software. Although every effort was made to ensure the accuracy of this automated transcription, some errors in transcription may have occurred.

## 2022-09-24 NOTE — GROUP NOTE
Group Therapy Note    Date: 9/24/2022    Group Start Time: 0900  Group End Time: 0930  Group Topic: Community Meeting    [facility]    [facility] Meeting Group Note        Date: September 24, 2022 Start Time: 0900 End Time: 0930      Number of Participants in Group & Unit Census:  8/15    Topic: Goals Group    Goal of Group: State one or two short term goals the patient would like to accomplish by the end of the day. Comments:     Patient did not participate in [employer] group, despite staff encouragement and explanation of benefits. Patient remain seclusive to self. Q15 minute safety checks maintained for patient safety and will continue to encourage patient to attend unit programming.        Group Therapy Note    Attendees: 8/15     Signature:  Brian Smith

## 2022-09-24 NOTE — PROGRESS NOTES
[facility]    Occupational Therapy Evaluation  Date: 22  Patient Name: Sandi Atkins       Room: 5134/3339-68  MRN: [id number]  Account: [de-identified]   : 1965  (56 y.o.) Gender: female     Discharge Recommendations:  Further Occupational Therapy is recommended upon facility discharge. OT Equipment Recommendations  Other: may benefit from bathroom equipment       Diagnosis: psychosis, DM, Recent hx: DKA, Acute toxic encephalopathy , upper GI bleed. PMHx: lupus, DDD, White matter changes, CVA, bipolar disorder and PTSD, polysubstance abuse. treated for DKA with IV insulin, acute renal failure with substantial hydration, and hypovolemic shock requiring Levophed, severe hypernatremia >170, concern for GI bleed status post EGD which showed multiple duodenal ulcers treated with PPI. Patient remained confused and delusional, CT head negative, MRI could not be done due to lack of patient cooperation Additional Pertinent Hx: Found unresponsive in home on  after family requested a well check. Pt was in DKA and had a BS of over 1060. She's been hospitalized since. Extensive history of alcohol use, cannabis and cocaine use recent. Hx of bipolar. Frequently confused, irritable. Refused to sign all paperwork upon admission. Irritable and agitated upon arrival. PT/OT consult for weak gait and difficulty standing from seated position. using w/c for mobility on [facility] unit. Patient has previous admissions to [facility] but nothing since 2018.          Past Medical History:  has a past medical history of Acid reflux, Acute cystitis with hematuria, Acute non-recurrent maxillary sinusitis, Asthma, Bipolar 1 disorder ([address] Bipolar disorder, mixed ([address] BMI 34.0-34.9,adult, Cannabis use disorder, severe, dependence ([address] Cerebrovascular accident (CVA) ([address] Chest pain, Chronic renal insufficiency, Cocaine abuse ([address] COVID-19 virus RNA test result unknown, DDD (degenerative disc disease), cervical, Diabetes mellitus ([facility] Dizziness, Fibromyalgia, History of stroke, Homicidal ideation, Hyperglycemia, Hypertension, Hypotension, IDDM (insulin dependent diabetes mellitus), Lupus ([facility] Migraine, Neuropathy, Neuropathy, Polysubstance abuse ([facility] Post traumatic stress disorder (PTSD), Posttraumatic stress disorder, Recurrent depression ([facility] Recurrent major depressive disorder, in partial remission ([facility] Screening mammogram, encounter for, Severe recurrent major depression with psychotic features ([facility] Severe recurrent major depression without psychotic features ([facility] Stroke (cerebrum) ([facility] Stroke ([facility] Suicidal ideation, Suicidal intent, Vitamin D deficiency, and White matter changes. Past Surgical History:   has a past surgical history that includes Abscess Drainage; chest tube insertion; Abdomen surgery; Cataract removal with implant (Bilateral); LASIK (Bilateral); Finger amputation (Left, 03/13/2021); Hand surgery (Right, 09/14/2021); Hand surgery (Right, 09/15/2021); Finger amputation (Right, 10/11/2021); Foot surgery (Right, 01/27/2022); Foot Debridement (Right, 1/27/2022); Insert Midline Catheter (2/3/2022); and Upper gastrointestinal endoscopy (N/A, 9/16/2022). Restrictions  Restrictions/Precautions  Restrictions/Precautions: Fall Risk  Required Braces or Orthoses?: No  Position Activity Restriction  Other position/activity restrictions: will benefit from patient directed therapy approach- easily agitated. per chart- pt has wounds R foot, abdomen, bottom, under B breasts. Vitals  Vitals  Heart Rate: 92  Heart Rate Source: Monitor  BP: (!) 100/51  BP Location: Right upper arm  BP Method: Automatic  MAP (Calculated): 67.33  Resp: 14     Subjective  Subjective: \"I don't want to do it. \"  pt response to most eval assessment tasks.          Social/Functional History  Social/Functional History  Lives With: Alone  Type of Home: Apartment  Home Layout: One level  Home Access:  (states \"1 step then slide step. \"  frustrated when OT attempt to inquire re \"Slide step\")  Bathroom Shower/Tub: Tub/Shower unit  Bathroom Equipment: Shower chair  Home Equipment: Kari Karsonkennedy, 4 wheeled  IADL Comments: per chart, pt dtr Anne Mood is supportive and may be obtaining guardianship.  dtr is checking to see if pt can return to her apt at [facility]. pt's dtr requested wellness check leading to pt found by EMT on floor of  her apt with poor alertness due to DKA. Additional Comments: limited info due to pt agitation and not tolerating questions. pt notes in past having someone who helped her bathe- possibly and aide, uncertain re time frame. pt notes being indep toileting and having no difficulty with current set up previously but will not answer ? re height of toilet or grab bars stating \"come and see for yourself, make your own judgement. \"      Objective  Cognition  Orientation  Orientation Level: Oriented to time (pt seems aware of her name and that she is in the hospital.  pt demo awareness this is a weekend stating \" Don't come here on Sat and Sunday and ask me to do it. \"  (therapy))  Cognition  Overall Cognitive Status: Exceptions  Arousal/Alertness: Appropriate responses to stimuli  Following Commands:  Follows one step commands consistently (when pt agrees)  Attention Span:  (able to maintain attention on chosen tasks ie. eat lunch, converse with other pts)  Memory: Decreased short term memory, Decreased recall of recent events, Decreased long term memory  Safety Judgement: Decreased awareness of need for assistance, Decreased awareness of need for safety  Problem Solving: Assistance required to identify errors made, Assistance required to generate solutions, Assistance required to implement solutions, Assistance required to correct errors made, Decreased awareness of errors  Insights: Not aware of deficits  Initiation: Requires cues for some  Sequencing: Requires cues for some  Cognition Comment: attempt to increase pt awareness of need to address mobility deficits not successful and pt demo confusion in her statements she yelled re this- stated both that she canwalk without difficulty so she does not need to stand as OT requesting and that she cannot walk and that she does not want to stand because of RLE pain. pt complaint of R sided pain especially LE hip to foot but is unable to ID why it is painful or other medical issues. when asked re diagnosis requiring hospitalization she indicates \"Something is wrong. \"  points to stomach but cannot elaborate. seems unaware of cognitive deficits and feels she does not have mobility deficits but is choosing to use w/c due to pain issues. poor memory and problem solving demo. fair initiation- pt requested nsg obtain a w/c for her use here. poor insight into current functional deficits, safety, need for assistance, roles of care providers in the hospital.  pt states \"I was having a conversation. \"  with another pt and expresses frustration re OT arriving to talk with her. pt thought her 4 WW is here, nsg checked in pt belongings storage but it is not here.  nsg states pt brought in by EMS unconscious so pt would not have had her 4WW brought in. Activities of Daily Living  ADL  Feeding: Independent  Grooming: Supervision  UE Bathing: Supervision  LE Bathing: Moderate assistance  UE Dressing: Supervision  LE Dressing: Moderate assistance  Toileting: Minimal assistance  Additional Comments: due to pt agitation level observation of pt was utilized:   pt out of room in w/c participating in conversations with nsg and other I patients. pt ate lunch seated at table in common area and demo indep self feeding. pt initiates using w/c to move about throughout common area without difficulty using feet primarily. above levels determined by info provided by RN and clinical judgement. RN notes pt needs assist for sit to stand from low toilet here and pt has remained continent.     Keke Dural Assessment  AROM: Generally decreased, functional  Strength: Generally decreased, functional  Coordination: Generally decreased, functional  Tone: Normal                   Mobility            per RN- min A with toilet transfers, pt refused to stand up from w/c when attempted in both sessions today. pt requested nsg obtain a w/c for her use here. nsg notes prior to this pt was walking short distances but was unsteady. now nsg notes pt is not walking,  pt was observed indep w/c mobility on unit- using primarily BLE. Transfers  Transfers  Transfer Comments: per RN- min A with toilet transfers, pt refused to stand up from w/c when attempted in both sessions today. Toilet Transfers  Toilet Transfer: Minimal assistance  Toilet Transfers Comments: per RN         Assessment  Assessment  Performance deficits / Impairments: Decreased functional mobility , Decreased endurance, Decreased ADL status, Decreased balance, Decreased safe awareness, Decreased high-level IADLs, Decreased cognition  Prognosis: Guarded (poor cognition and cooperation limiting)  Decision Making: High Complexity  History: psychosis, DM, Recent hx: DKA, Acute toxic encephalopathy , upper GI bleed. PMHx: lupus, DDD, White matter changes, CVA, bipolar disorder and PTSD, polysubstance abuse. treated for DKA with IV insulin, acute renal failure with substantial hydration, and hypovolemic shock requiring Levophed, severe hypernatremia >170, concern for GI bleed status post EGD which showed multiple duodenal ulcers treated with PPI.   Patient remained confused and delusional, CT head negative, MRI could not be done due to lack of patient cooperation  Exam: 7 performance deficits  Assistance / Modification: high due to cognition, mobility, pain, agitation    Activity Tolerance  Activity Tolerance: Treatment limited secondary to agitation, Patient limited by pain  Activity Tolerance Comments: some components of eval not able to be formally assessed due to pt agitation. conversation, observation and pt directed approach was used. a break between therapy attempts was utilized. pt yelling at therapist during 2nd attempt to engage pt in mobility assessment, attempt to increase pt awareness of need to address mobility deficits not successful. RN intervened and told pt she could decline and pt calmed down and later apologized. pt also refused to talk to NP. OT let NP know re pt complaint of R sided pain especially LE hip to foot. Patient Education  Patient Education  Education Given To: Patient  Education Provided: Role of Therapy, Plan of Care  Education Method: Demonstration, Verbal  Barriers to Learning: Cognition  Education Outcome: Unable to demonstrate understanding      Functional Outcome Measures  AM-PAC Daily Activity Inpatient   How much help for putting on and taking off regular lower body clothing?: A Lot  How much help for Bathing?: A Lot  How much help for Toileting?: A Little  How much help for putting on and taking off regular upper body clothing?: A Little  How much help for taking care of personal grooming?: A Little  How much help for eating meals?: None  AM-[facility] Inpatient Daily Activity Raw Score: 17  AM-PAC Inpatient ADL T-Scale Score : 37.26  ADL Inpatient CMS 0-100% Score: 50.11  ADL Inpatient CMS G-Code Modifier : CK       Goals  Patient Goals   Patient goals : pt does not feel she needs to show anyone she can do things ie. stand. she did live home indep and had 4WW, now using w/c, not ambulating. Short Term Goals  Time Frame for Short term goals: 1 week  Short Term Goal 1: pt to dudley 3-5 min standing during adls with 1 UE support and CGA.   Short Term Goal 2: mod indep with toilet transfer (from w/c with BSC over toilet)  (if allowed on B[facility])  Short Term Goal 3: set up bathe and dress with good cognition, safety and balance  Short Term Goal 4: pt to engage in leisure activity of her choice during OT and demo fair attention to task for 8 minutes. Short Term Goal 5: pt to ambulate with least restrictive device and CGA during adls ie.  from bed to toilet and reverse (approx 20 feet)    Plan  Plan  Times per Week: 3-5  Current Treatment Recommendations: ROM, Strengthening, Balance training, Functional mobility training, Safety education & training, Self-Care / ADL, Home management training, Endurance training, Patient/Caregiver education & training, Equipment evaluation, education, & procurement, Cognitive/Perceptual training      OT Individual Minutes  OT Individual Minutes  Time In: 2385  Time Out: 5118  Minutes: 24     Also 11:12 to 11:31      Electronically signed by Nila Montana OT on 9/24/22 at 6:19 PM EDT

## 2022-09-24 NOTE — PLAN OF CARE
Problem: Anxiety  Goal: Will report anxiety at manageable levels  Description: INTERVENTIONS:  1. Administer medication as ordered  2. Teach and rehearse alternative coping skills  3. Provide emotional support with 1:1 interaction with staff  9/24/2022 0932 by Shefali Culp RN  Outcome: Progressing     Problem: Confusion  Goal: Confusion, delirium, dementia, or psychosis is improved or at baseline  Description: INTERVENTIONS:  1. Assess for possible contributors to thought disturbance, including medications, impaired vision or hearing, underlying metabolic abnormalities, dehydration, psychiatric diagnoses, and notify attending LIP  2. [place] high risk fall precautions, as indicated  3. Provide frequent short contacts to provide reality reorientation, refocusing and direction  4. Decrease environmental stimuli, including noise as appropriate  5. Monitor and intervene to maintain adequate nutrition, hydration, elimination, sleep and activity  6. If unable to ensure safety without constant attention obtain sitter and review sitter guidelines with assigned personnel  7. Initiate Psychosocial CNS and Spiritual Care consult, as indicated  9/24/2022 0932 by Shefali Culp RN  Outcome: Progressing     Problem: Safety - Adult  Goal: Free from fall injury  9/24/2022 0932 by Shefali Culp RN  Outcome: Progressing     Problem: Pain  Goal: Verbalizes/displays adequate comfort level or baseline comfort level  9/24/2022 0932 by Shefali Culp RN  Outcome: Progressing     Problem: Self Harm/Suicidality  Goal: Will have no self-injury during hospital stay  Description: INTERVENTIONS:  1. Q 30 MINUTES: Routine safety checks  2. Q SHIFT & PRN: Assess risk to determine if routine checks are adequate to maintain patient safety  9/24/2022 0932 by Shefali Culp RN  Outcome: Progressing     Upon approach, patient observed to be flat and irritable at times.  Patient endorses feelings of anxiety, denies depression; she also denies any thoughts of harming herself or others. At this time, patient denies experiencing auditory, visual, tactile, olfactory and gustatory hallucinations. Vital signs remain stable at this time, patient has some complaints of pain and is being managed with prescribed medications. Patient uses a wheelchair but has capability of completing ADLs with minimal assist from staff and is encouraged to do so. At this time, patient remains free from injuries related to falls and from self inflicted injuries; safety checks continue every 15 minutes and at random intervals.

## 2022-09-25 LAB
GLUCOSE BLD-MCNC: 142 MG/DL (ref 65–105)
GLUCOSE BLD-MCNC: 204 MG/DL (ref 65–105)
GLUCOSE BLD-MCNC: 224 MG/DL (ref 65–105)
GLUCOSE BLD-MCNC: 255 MG/DL (ref 65–105)

## 2022-09-25 PROCEDURE — 99232 SBSQ HOSP IP/OBS MODERATE 35: CPT | Performed by: INTERNAL MEDICINE

## 2022-09-25 PROCEDURE — 6370000000 HC RX 637 (ALT 250 FOR IP): Performed by: PSYCHIATRY & NEUROLOGY

## 2022-09-25 PROCEDURE — 99231 SBSQ HOSP IP/OBS SF/LOW 25: CPT | Performed by: NURSE PRACTITIONER

## 2022-09-25 PROCEDURE — 6370000000 HC RX 637 (ALT 250 FOR IP): Performed by: INTERNAL MEDICINE

## 2022-09-25 PROCEDURE — 1240000000 HC EMOTIONAL WELLNESS R&B

## 2022-09-25 PROCEDURE — 82947 ASSAY GLUCOSE BLOOD QUANT: CPT

## 2022-09-25 RX ADMIN — PANTOPRAZOLE SODIUM 40 MG: 40 TABLET, DELAYED RELEASE ORAL at 08:33

## 2022-09-25 RX ADMIN — IBUPROFEN 400 MG: 400 TABLET ORAL at 02:06

## 2022-09-25 RX ADMIN — INSULIN LISPRO 2 UNITS: 100 INJECTION, SOLUTION INTRAVENOUS; SUBCUTANEOUS at 17:09

## 2022-09-25 RX ADMIN — PREGABALIN 75 MG: 75 CAPSULE ORAL at 21:05

## 2022-09-25 RX ADMIN — INSULIN LISPRO 1 UNITS: 100 INJECTION, SOLUTION INTRAVENOUS; SUBCUTANEOUS at 11:51

## 2022-09-25 RX ADMIN — METFORMIN HYDROCHLORIDE 1000 MG: 1000 TABLET ORAL at 17:09

## 2022-09-25 RX ADMIN — HYDROXYZINE HYDROCHLORIDE 50 MG: 50 TABLET ORAL at 21:05

## 2022-09-25 RX ADMIN — INSULIN GLARGINE 17 UNITS: 100 INJECTION, SOLUTION SUBCUTANEOUS at 21:04

## 2022-09-25 RX ADMIN — ATORVASTATIN CALCIUM 40 MG: 20 TABLET, FILM COATED ORAL at 21:05

## 2022-09-25 RX ADMIN — HYDROXYZINE HYDROCHLORIDE 50 MG: 50 TABLET ORAL at 00:37

## 2022-09-25 RX ADMIN — TRAZODONE HYDROCHLORIDE 50 MG: 50 TABLET ORAL at 00:37

## 2022-09-25 RX ADMIN — PANTOPRAZOLE SODIUM 40 MG: 40 TABLET, DELAYED RELEASE ORAL at 21:05

## 2022-09-25 RX ADMIN — METFORMIN HYDROCHLORIDE 1000 MG: 1000 TABLET ORAL at 08:33

## 2022-09-25 RX ADMIN — PREGABALIN 75 MG: 75 CAPSULE ORAL at 08:33

## 2022-09-25 RX ADMIN — OLANZAPINE 15 MG: 10 TABLET, ORALLY DISINTEGRATING ORAL at 21:05

## 2022-09-25 RX ADMIN — INSULIN GLARGINE 17 UNITS: 100 INJECTION, SOLUTION SUBCUTANEOUS at 08:33

## 2022-09-25 RX ADMIN — ACETAMINOPHEN 650 MG: 325 TABLET, FILM COATED ORAL at 07:44

## 2022-09-25 ASSESSMENT — PAIN SCALES - GENERAL
PAINLEVEL_OUTOF10: 0
PAINLEVEL_OUTOF10: 7
PAINLEVEL_OUTOF10: 7
PAINLEVEL_OUTOF10: 0

## 2022-09-25 ASSESSMENT — PAIN DESCRIPTION - LOCATION
LOCATION: LEG
LOCATION: KNEE

## 2022-09-25 ASSESSMENT — PAIN DESCRIPTION - DESCRIPTORS
DESCRIPTORS: DISCOMFORT;SHARP;SHOOTING
DESCRIPTORS: ACHING

## 2022-09-25 ASSESSMENT — PAIN DESCRIPTION - ORIENTATION: ORIENTATION: RIGHT

## 2022-09-25 NOTE — GROUP NOTE
Group Therapy Note    Date: 9/25/2022    Group Start Time: 8332  Group End Time: 1150  Group Topic: Recreational    DANIELLE Corona    Recreation Group Note        Date: 9/25/2022   Start Time: 4650  End Time: 1150      Number of Participants in Group & Unit Census:  8/17    Topic: Patients were offered opportunities to engage in art, games, and request and listen to music. Goal of Group:Patient goals to increase socialization; Demonstrate positive use of time and leisure opportunities; Increase self-expression; Normalization of the environment      Comments:     Patient did not participate in Recreation group, despite staff encouragement and explanation of benefits. Patient remain seclusive to self. Q15 minute safety checks maintained for patient safety and will continue to encourage patient to attend unit programming.

## 2022-09-25 NOTE — PLAN OF CARE
Problem: Anxiety  Goal: Will report anxiety at manageable levels  Description: INTERVENTIONS:  1. Administer medication as ordered  2. Teach and rehearse alternative coping skills  3. Provide emotional support with 1:1 interaction with staff  9/25/2022 1244 by Tree Powers RN  Outcome: Progressing     Problem: Confusion  Goal: Confusion, delirium, dementia, or psychosis is improved or at baseline  Description: INTERVENTIONS:  1. Assess for possible contributors to thought disturbance, including medications, impaired vision or hearing, underlying metabolic abnormalities, dehydration, psychiatric diagnoses, and notify attending LIP  2. [place] high risk fall precautions, as indicated  3. Provide frequent short contacts to provide reality reorientation, refocusing and direction  4. Decrease environmental stimuli, including noise as appropriate  5. Monitor and intervene to maintain adequate nutrition, hydration, elimination, sleep and activity  6. If unable to ensure safety without constant attention obtain sitter and review sitter guidelines with assigned personnel  7. Initiate Psychosocial CNS and Spiritual Care consult, as indicated  9/25/2022 1244 by Tree Powers RN  Outcome: Progressing     Problem: Self Harm/Suicidality  Goal: Will have no self-injury during hospital stay  Description: INTERVENTIONS:  1. Q 15 MINUTES: Routine safety checks  2. Q SHIFT & PRN: Assess risk to determine if routine checks are adequate to maintain patient safety  9/25/2022 1244 by Tree Powers RN  Outcome: Progressing     Problem: Pain  Goal: Verbalizes/displays adequate comfort level or baseline comfort level  9/25/2022 1244 by Tree Powers RN  Outcome: Progressing     Patient denied suicidal ideations. Safety checks every 15 minutes continued per unit policy. Patient denied anxiety. Patient reports that she is doing better.  Patient is observed to be paranoid, stating \"they take stuff from me like my trays of food, why do they do that to me\". Patient is able to be redirectable. Patient reports pain in her lower legs. Patient reports that her pain management is not satisfying. Writer notified Nurse Practitioner of patients pain issues.

## 2022-09-25 NOTE — PLAN OF CARE
Problem: Anxiety  Goal: Will report anxiety at manageable levels  Description: INTERVENTIONS:  1. Administer medication as ordered  2. Teach and rehearse alternative coping skills  3. Provide emotional support with 1:1 interaction with staff  9/24/2022 2226 by Leopoldo Retort, RN  Outcome: Progressing  Patient remains anxious and irritable at times, minimally accepting to staff support and promotion of coping skills, refused offered medication. Problem: Confusion  Goal: Confusion, delirium, dementia, or psychosis is improved or at baseline  Description: INTERVENTIONS:  1. Assess for possible contributors to thought disturbance, including medications, impaired vision or hearing, underlying metabolic abnormalities, dehydration, psychiatric diagnoses, and notify attending LIP  2. [place] high risk fall precautions, as indicated  3. Provide frequent short contacts to provide reality reorientation, refocusing and direction  4. Decrease environmental stimuli, including noise as appropriate  5. Monitor and intervene to maintain adequate nutrition, hydration, elimination, sleep and activity  6. If unable to ensure safety without constant attention obtain sitter and review sitter guidelines with assigned personnel  7. Initiate Psychosocial CNS and Spiritual Care consult, as indicated  9/24/2022 2226 by Leopoldo Retort, RN  Outcome: Progressing      Problem: Safety - Adult  Goal: Free from fall injury  9/24/2022 2226 by Leopoldo Retort, RN  Outcome: Progressing  Patient remains free of falls, uses wheelchair appropriately on unit, 15 minutes safety rounds maintained. Problem: Pain  Goal: Verbalizes/displays adequate comfort level or baseline comfort level  9/24/2022 2226 by Leopoldo Retort, RN  Outcome: Progressing  Patient denies pain during assessment and reported that she will verbalize as needed.       Problem: Self Harm/Suicidality  Goal: Will have no self-injury during hospital stay  Description: INTERVENTIONS:  1. Q 30 MINUTES: Routine safety checks  2. Q SHIFT & PRN: Assess risk to determine if routine checks are adequate to maintain patient safety  9/24/2022 2226 by Enrike Wheeler RN  Outcome: Progressing  Patient denies thoughts of self harm and is agreeable to seeking out should thoughts of self harm arise. Safe environment maintained. 15 minute checks for safety continued per unit policy. Will continue to monitor for safety and provides support and reassurance as needed.

## 2022-09-25 NOTE — BH NOTE
Patient refused scheduled medication, emphasized importance of compliance with treatment. Patient is irritable and believes that someone stole her evening snack and continues to refuse medication.

## 2022-09-25 NOTE — PLAN OF CARE
5 [facility]  Day 3 Interdisciplinary Treatment Plan NOTE    Review Date & Time: 9/25/2022  8138    Admission Type:   Admission Type: Involuntary (Pink Slipped on 9/22/22 at 11:25 am)    Reason for admission:  Reason for Admission: Poor insight, unable of making decisions on own at this time. Psychosis unspecified.   Estimated Length of Stay:  5-7 days  Estimated Discharge Date Update:   to be determined by physician    PATIENT STRENGTHS:  Patient Strengths:   Patient Strengths and Limitations:Limitations: Difficult relationships / poor social skills, Difficulty problem solving/relies on others to help solve problems, Inappropriate/potentially harmful leisure interests  Addictive Behavior:Addictive Behavior  In the Past 3 Months, Have You Felt or Has Someone Told You That You Have a Problem With  : None  Medical Problems:  Past Medical History:   Diagnosis Date    Acid reflux 5/29/2017    Acute cystitis with hematuria 10/11/2016    Acute non-recurrent maxillary sinusitis 11/28/2017    Asthma     Bipolar 1 disorder ([address] 1/4/2018    Bipolar disorder, mixed ([address]     BMI 34.0-34.9,adult 11/28/2017    Cannabis use disorder, severe, dependence ([address] 12/19/2017    Cerebrovascular accident (CVA) ([address] 6/14/2017    Chest pain 11/5/2016    Chronic renal insufficiency     Cocaine abuse ([address] 1/5/2018    COVID-19 virus RNA test result unknown     DDD (degenerative disc disease), cervical     Diabetes mellitus ([address]     Dizziness 6/13/2017    Fibromyalgia     History of stroke 9/9/2017    Homicidal ideation 11/6/2017    Hyperglycemia     Hypertension     Hypotension 1/18/2019    IDDM (insulin dependent diabetes mellitus) 12/21/2015    Lupus ([address]     Migraine     Neuropathy     Neuropathy     Polysubstance abuse ([address] 9/17/2017    Post traumatic stress disorder (PTSD)     Posttraumatic stress disorder 5/29/2017    Recurrent depression ([address] 5/29/2017    Recurrent major depressive disorder, in partial remission ([facility] 11/28/2017    Screening mammogram, encounter for 11/28/2017    Severe recurrent major depression with psychotic features ([facility] 12/19/2017    Severe recurrent major depression without psychotic features ([facility] 12/19/2017    Stroke (cerebrum) ([facility] 6/14/2017    Stroke ([facility]     per chart notes    Suicidal ideation     Suicidal intent 3/10/2017    Vitamin D deficiency 11/28/2017    White matter changes 6/13/2017       Risk:  Fall Risk   Marquez Scale Marquez Scale Score: 20  BVC    Change in scores:  No Changes to plan of Care:  No    Status EXAM:   Mental Status and Behavioral Exam  Normal: No  Level of Assistance: Independent/Self  Facial Expression: Flat  Affect: Unstable  Level of Consciousness: Alert  Frequency of Checks: 4 times per hour, close  Mood:Normal: No  Mood: Labile, Irritable, Depressed  Motor Activity:Normal: No  Motor Activity: Other (comment) (w/c)  Eye Contact: Fair  Observed Behavior: Preoccupied  Sexual Misconduct History: Current - no  Preception: [place] to person, [place] to time, [place] to place, [place] to situation  Attention:Normal: No  Attention: Distractible  Thought Processes: Loose association, Tangential  Thought Content:Normal: No  Thought Content: Paranoia, Preoccupations, Delusions  Depression Symptoms: Increased irritability  Anxiety Symptoms: Generalized  Jennifer Symptoms: No problems reported or observed.   Hallucinations: None  Delusions: Yes  Delusions: Paranoid  Memory:Normal: Yes  Memory: Poor recent, Poor remote  Insight and Judgment: No  Insight and Judgment: Poor judgment, Poor insight    Daily Assessment Last Entry:   Daily Sleep (WDL): Within Defined Limits            Daily Nutrition (WDL): Within Defined Limits  Level of Assistance: Independent/Self    Patient Monitoring:  Frequency of Checks: 4 times per hour, close    Psychiatric Symptoms:   Depression Symptoms  Depression Symptoms: Increased irritability  Anxiety Symptoms  Anxiety Symptoms: Generalized  Jennifer Symptoms  Jennifer Symptoms: No problems reported or observed. Suicide Risk CSSR-S:  1) Within the past month, have you wished you were dead or wished you could go to sleep and not wake up? : No  2) Have you actually had any thoughts of killing yourself? : No  6) Have you ever done anything, started to do anything, or prepared to do anything to end your life?: No  Change in Result:  No Change in Plan of care:  No      EDUCATION:   Learner Progress Toward Treatment Goals:   Reviewed results and recommendations of this team, Reviewed group plan and strategies, Reviewed signs, symptoms and risk of self harm and violent behavior, Reviewed goals and plan of care    Method:  small group, individual verbal education    Outcome:   Verbalized by patient but needs reinforcement to obtain goals    PATIENT GOALS:  Short term:  Patient declined to attend treatment team, no short term goals were discussed at this time. Long term:  Patient declined to attend treatment team, no long term goals were discussed at this time.      PLAN/TREATMENT RECOMMENDATIONS UPDATE:  continue with group therapies, increased socialization, continue planning for after discharge goals, continue with medication compliance    SHORT-TERM GOALS UPDATE:   Time frame for Short-Term Goals:  5-7 days    LONG-TERM GOALS UPDATE:   Time frame for Long-Term Goals:  6 months    Members Present in Team Meeting:   See Signatures Sheet   Jonel Hsu RN

## 2022-09-25 NOTE — BH NOTE
Offered evening medication again, encouraged compliance with treatment, patient states \"I already took it and I am not taking it again\" and requests that staff leave room.

## 2022-09-25 NOTE — PROGRESS NOTES
Daily Progress Note  9/25/2022    Patient Name: Valentina Mccormick: Acute psychosis         SUBJECTIVE:   Patient was seen for follow-up assessment today. Patient has been medication adherent with psychiatric medications after much encouragement and behaviorally in control. She has not received any emergency medications in the last 24 hours. Staff report that patient refused physical therapy today. She has been getting up for meals and needs but she has not been participating in groups. She was resting in bed on approach and initially refused to engage in conversation with writer. She then did acknowledge writer's presence and was less irritable today. She stated \"I love you, but not now\". She reiterated that she was just tired and wanted to sleep. Patient is encouraged to spend time in the day area and to attend groups as she may find this to be beneficial to her mood. She was then dismissive of writer and would no longer engage in conversation.     Appetite:  [x] Normal/Adequate/Unchanged  [] Increased  [] Decreased      Sleep:       [] Normal/Adequate/Unchanged  [] Fair  [x] Poor      Group Attendance:   [] Yes  [x] Selectively    [] No    Medication Side Effects: Denies         Mental Status Exam  Level of consciousness: Alert and awake, but reports somnolence  Appearance: Appropriate attire for setting, resting in bed, with fair  grooming and hygiene   Behavior/Motor: Guarded, evasive, calm  Attitude toward examiner: uncooperative, inattentive, poor eye contact   Speech: spontaneous, normal rate, and normal volume, less irritable tone  Mood: Constricted  Affect: Blunted  Thought processes: linear and coherent   Thought content: Denies homicidal ideation  Suicidal Ideation: Denies  Delusions: Paranoia, not evident  Perceptual Disturbance: patient is not observed responding to internal stimuli; denies AVH  Cognition: Oriented to self, location, time, and situation  Memory: impaired Insight & Judgement: poor     Data   height is 5' 5\" (1.651 m) and weight is 207 lb (93.9 kg). Her temporal temperature is 97.9 °F (36.6 °C). Her blood pressure is 103/59 (abnormal) and her pulse is 95. Her respiration is 13.    Labs:   Admission on 09/22/2022   Component Date Value Ref Range Status    POC Glucose 09/22/2022 135 (A)  65 - 105 mg/dL Final    POC Glucose 09/23/2022 168 (A)  65 - 105 mg/dL Final    WBC 09/23/2022 5.3  3.5 - 11.0 k/uL Final    RBC 09/23/2022 2.72 (A)  4.0 - 5.2 m/uL Final    Hemoglobin 09/23/2022 8.6 (A)  12.0 - 16.0 g/dL Final    Hematocrit 09/23/2022 26.8 (A)  36 - 46 % Final    MCV 09/23/2022 98.8  80 - 100 fL Final    MCH 09/23/2022 31.8  26 - 34 pg Final    MCHC 09/23/2022 32.2  31 - 37 g/dL Final    RDW 09/23/2022 14.9  11.5 - 14.9 % Final    Platelets 88/45/4583 434  150 - 450 k/uL Final    MPV 09/23/2022 7.8  6.0 - 12.0 fL Final    Glucose 09/23/2022 315 (A)  70 - 99 mg/dL Final    BUN 09/23/2022 13  6 - 20 mg/dL Final    Creatinine 09/23/2022 0.78  0.50 - 0.90 mg/dL Final    Calcium 09/23/2022 8.7  8.6 - 10.4 mg/dL Final    Sodium 09/23/2022 135  135 - 144 mmol/L Final    Potassium 09/23/2022 4.8  3.7 - 5.3 mmol/L Final    Chloride 09/23/2022 101  98 - 107 mmol/L Final    CO2 09/23/2022 24  20 - 31 mmol/L Final    Anion Gap 09/23/2022 10  9 - 17 mmol/L Final    Alkaline Phosphatase 09/23/2022 243 (A)  35 - 104 U/L Final    ALT 09/23/2022 10  5 - 33 U/L Final    AST 09/23/2022 8  <32 U/L Final    Total Bilirubin 09/23/2022 0.2 (A)  0.3 - 1.2 mg/dL Final    Total Protein 09/23/2022 6.8  6.4 - 8.3 g/dL Final    Albumin 09/23/2022 2.7 (A)  3.5 - 5.2 g/dL Final    GFR Non- 09/23/2022 >60  >60 mL/min Final    GFR  09/23/2022 >60  >60 mL/min Final    GFR Comment 09/23/2022        Final    Comment: Average GFR for 52-63 years old:   80 mL/min/1.73sq m  Chronic Kidney Disease:   <60 mL/min/1.73sq m  Kidney failure:   <15 mL/min/1.73sq m              eGFR calculated using average adult body mass. Additional eGFR calculator available at:        Comr.se.com.br            POC Glucose 09/23/2022 222 (A)  65 - 105 mg/dL Final    POC Glucose 09/23/2022 286 (A)  65 - 105 mg/dL Final    POC Glucose 09/23/2022 237 (A)  65 - 105 mg/dL Final    POC Glucose 09/24/2022 185 (A)  65 - 105 mg/dL Final    POC Glucose 09/24/2022 177 (A)  65 - 105 mg/dL Final    POC Glucose 09/24/2022 241 (A)  65 - 105 mg/dL Final    POC Glucose 09/24/2022 221 (A)  65 - 105 mg/dL Final    POC Glucose 09/24/2022 248 (A)  65 - 105 mg/dL Final    POC Glucose 09/25/2022 142 (A)  65 - 105 mg/dL Final    POC Glucose 09/25/2022 204 (A)  65 - 105 mg/dL Final    POC Glucose 09/25/2022 255 (A)  65 - 105 mg/dL Final         Reviewed patient's current plan of care and vital signs with nursing staff.     Labs reviewed: [x] Yes  Last EKG in EMR reviewed: [x] Yes    Medications  Current Facility-Administered Medications: insulin glargine (LANTUS) injection vial 17 Units, 17 Units, SubCUTAneous, BID  atorvastatin (LIPITOR) tablet 40 mg, 40 mg, Oral, Nightly  metFORMIN (GLUCOPHAGE) tablet 1,000 mg, 1,000 mg, Oral, BID WC  glucagon (rDNA) injection 1 mg, 1 mg, SubCUTAneous, PRN  glucose chewable tablet 16 g, 4 tablet, Oral, PRN  OLANZapine zydis (ZYPREXA) disintegrating tablet 15 mg, 15 mg, Oral, Nightly  pantoprazole (PROTONIX) tablet 40 mg, 40 mg, Oral, BID  pregabalin (LYRICA) capsule 75 mg, 75 mg, Oral, BID  acetaminophen (TYLENOL) tablet 650 mg, 650 mg, Oral, Q6H PRN  ibuprofen (ADVIL;MOTRIN) tablet 400 mg, 400 mg, Oral, Q6H PRN  hydrOXYzine HCl (ATARAX) tablet 50 mg, 50 mg, Oral, TID PRN  traZODone (DESYREL) tablet 50 mg, 50 mg, Oral, Nightly PRN  polyethylene glycol (GLYCOLAX) packet 17 g, 17 g, Oral, Daily PRN  aluminum & magnesium hydroxide-simethicone (MAALOX) 200-200-20 MG/5ML suspension 30 mL, 30 mL, Oral, Q6H PRN  nicotine polacrilex (NICORETTE) gum 2 mg, 2 mg, Oral, Q2H PRN  haloperidol lactate (HALDOL) injection 5 mg, 5 mg, IntraMUSCular, Q6H PRN **AND** LORazepam (ATIVAN) injection 2 mg, 2 mg, IntraMUSCular, Q6H PRN **AND** diphenhydrAMINE (BENADRYL) injection 50 mg, 50 mg, IntraMUSCular, Q6H PRN  insulin lispro (HUMALOG) injection vial 0-4 Units, 0-4 Units, SubCUTAneous, TID WC  insulin lispro (HUMALOG) injection vial 0-4 Units, 0-4 Units, SubCUTAneous, Nightly    ASSESSMENT  Unspecified psychosis not due to a substance or known physiological condition [facility])         PLAN  Patient symptoms: Show no change  Continue current medication regimen. Monitor need and frequency of PRN medications. Encourage participation in groups and milieu. Attempt to develop insight. Psycho-education conducted. Supportive Therapy conducted. Probable discharge is per attending physician. Follow-up daily while inpatient. Patient continues to be monitored in the inpatient psychiatric facility at [facility] for safety and stabilization. Patient continues to need, on a daily basis, active treatment furnished directly by or requiring the supervision of inpatient psychiatric personnel. Electronically signed by LAILA Penn CNP on 9/25/2022 at 5:44 PM    **This report has been created using voice recognition software. It may contain minor errors which are inherent in voice recognition technology. **

## 2022-09-25 NOTE — PROGRESS NOTES
[facility] Internal Medicine    HISTORY AND PHYSICAL EXAMINATION/ CONSULT NOTE            Date:   9/25/2022  Patient name:  Ronald Hardy  Date of admission:  9/22/2022  3:40 PM  MRN:   [id number]  Account:  [de-identified]  YOB: 1965  PCP:    Maria De Jesus Ramsay MD  Room:   [address] [place] [address]  Code Status:    Full Code    Physician Requesting Consult: Tay Hernandez MD    Reason for Consult: History and physical, medical management    Chief Complaint:     No chief complaint on file. Medical management    History Obtained From:     Patient, EMR, nursing staff    History of Present Illness:     56-year-old female admitted for acute psychotic state    She was initially admitted to medical unit after being found unresponsive at her home, treated for DKA with IV insulin, acute renal failure with substantial hydration, and hypovolemic shock requiring Levophed, severe hypernatremia >170, concern for GI bleed status post EGD which showed multiple duodenal ulcers treated with PPI.   Patient remained confused and delusional, CT head negative, MRI could not be done due to lack of patient cooperation      Medical history includes asthma, hypertension, stroke, type 2 diabetes, polysubstance abuse, recent ischemic stroke  Past Medical History:     Past Medical History:   Diagnosis Date    Acid reflux 5/29/2017    Acute cystitis with hematuria 10/11/2016    Acute non-recurrent maxillary sinusitis 11/28/2017    Asthma     Bipolar 1 disorder ([address] 1/4/2018    Bipolar disorder, mixed ([address]     BMI 34.0-34.9,adult 11/28/2017    Cannabis use disorder, severe, dependence ([address] 12/19/2017    Cerebrovascular accident (CVA) ([address] 6/14/2017    Chest pain 11/5/2016    Chronic renal insufficiency     Cocaine abuse ([address] 1/5/2018    COVID-19 virus RNA test result unknown     DDD (degenerative disc disease), cervical     Diabetes mellitus ([address]     Dizziness 6/13/2017    Fibromyalgia     History of stroke 9/9/2017    Homicidal ideation 11/6/2017    Hyperglycemia     Hypertension     Hypotension 1/18/2019    IDDM (insulin dependent diabetes mellitus) 12/21/2015    Lupus ([address]     Migraine     Neuropathy     Neuropathy     Polysubstance abuse ([address] 9/17/2017    Post traumatic stress disorder (PTSD)     Posttraumatic stress disorder 5/29/2017    Recurrent depression ([address] 5/29/2017    Recurrent major depressive disorder, in partial remission ([address] 11/28/2017    Screening mammogram, encounter for 11/28/2017    Severe recurrent major depression with psychotic features ([address] 12/19/2017    Severe recurrent major depression without psychotic features ([address] 12/19/2017    Stroke (cerebrum) ([address] 6/14/2017    Stroke ([address]     per chart notes    Suicidal ideation     Suicidal intent 3/10/2017    Vitamin D deficiency 11/28/2017    White matter changes 6/13/2017        Past Surgical History:     Past Surgical History:   Procedure Laterality Date    ABDOMEN SURGERY      drain tube    ABSCESS DRAINAGE      right buttock    CATARACT REMOVAL WITH IMPLANT Bilateral     CHEST TUBE INSERTION      FINGER AMPUTATION Left 03/13/2021    LEFT RING FINER AMPUTATION performed by Lucia Paredes MD at [address] Right 10/11/2021    AMPUTATION RIGHT INDEX FINGER performed by Lucia Paredes MD at [address] Right 1/27/2022    FOOT ABSCESS INCISION AND DRAINAGE  (PULSE LAVAGE, CULTURE SWABS) performed by Frankey Alf, DPM at [address] [facility] Right 01/27/2022    FOOT ABSCESS INCISION AND DRAINAGE (PULSE LAVAGE, CULTURE SWABS) - Right    HAND SURGERY Right 09/14/2021    I&D INDEX FINGER performed by Lucia Paredes MD at [facility] Right 09/15/2021    I&D INDEX FINGER performed by Lucia Paredes MD at [address]  2/3/2022         LASIK Bilateral     UPPER GASTROINTESTINAL ENDOSCOPY N/A 9/16/2022    EGD BIOPSY performed by Mathilda Blizzard, MD at [facility]        Medications Prior to Admission:     Prior to Admission medications    Medication Sig Start Date End Date Taking? Authorizing Provider   insulin glargine (LANTUS SOLOSTAR) 100 UNIT/ML injection pen Inject 30 Units into the skin 2 times daily 4/22/22   Gerardo Chowdary MD   atorvastatin (LIPITOR) 40 MG tablet Take 1 tablet by mouth nightly 4/22/22 9/8/22  Gerardo Chowdary MD   acetaminophen (TYLENOL) 500 MG tablet Take 2 tablets by mouth 3 times daily as needed for Pain  Patient not taking: Reported on 8/8/2022 3/15/22   Gerardo Chowdary MD   fluticasone (FLONASE) 50 MCG/ACT nasal spray 1 spray by Each Nostril route daily 3/15/22   Gerardo Chowdary MD   metFORMIN (GLUCOPHAGE) 1000 MG tablet Take 1 tablet by mouth 2 times daily (with meals) 3/15/22   Gerardo Chowdary MD   mirtazapine (REMERON) 7.5 MG tablet Take 1 tablet by mouth nightly 3/15/22   Gerardo Chowdary MD   traZODone (DESYREL) 150 MG tablet Take 1 tablet by mouth nightly 3/15/22   Gerardo Chowdary MD   venlafaxine (EFFEXOR XR) 150 MG extended release capsule Take 1 capsule by mouth daily (with breakfast) 3/15/22   Gerardo Chowdary MD   Alcohol Swabs 70 % PADS Use 1 swab 3 times daily as needed 3/15/22   Gerardo Chowdary MD   blood glucose monitor strips Test 3 times a day & as needed for symptoms of irregular blood glucose. Dispense sufficient amount for indicated testing frequency plus additional to accommodate PRN testing needs.  3/15/22   Gerardo Chowdary MD   Lancets MISC 1 each by Does not apply route 3 times daily 3/15/22   Gerardo Chowdary MD   Insulin Pen Needle ([employer] PEN NEEDLES 31G) 31G X 8 MM MISC 1 each by Does not apply route daily 3/15/22   Gerardo Chowdary MD   FREESTYLE LITE strip 1 each by Does not apply route 3 times daily 3/15/22   Gerardo Chowdary MD   budesonide-formoterol (SYMBICORT) 80-4.5 MCG/ACT AERO Inhale 2 puffs into the lungs 2 times daily 3/15/22   Gerardo Chowdary MD   albuterol sulfate  (90 Base) MCG/ACT inhaler Inhale 2 puffs into the lungs every 4 hours as needed for Wheezing 3/15/22 4/15/22  Lorenzo Conti MD   mupirocin (BACTROBAN) 2 % ointment Apply topically 3 times daily Apply topically to right foot wound two times a day for wound care. Apply to right foot wound, cover with Kerlix and ace wrap. Historical Provider, MD   Misc. Devices MISC 1 PAIR OF DIABETIC SHOES (1 LEFT/ 1 RIGHT)  1-3 PAIRS OF INSERTS (LEFT/ RIGHT) 2/15/22   [place] Alar, DPM   dicyclomine (BENTYL) 10 MG capsule Take 1 capsule by mouth 4 times daily 21   Lorenzo Conti MD        Allergies:     Bactrim [sulfamethoxazole-trimethoprim] and Adhesive tape    Social History:     Tobacco:    reports that she has never smoked. She has never used smokeless tobacco.  Alcohol:      reports that she does not currently use alcohol. Drug Use:  reports current drug use. Drugs: Marijuana (Weed) and Cocaine. Family History:     Family History   Problem Relation Age of Onset    Diabetes Mother     Stroke Mother     Diabetes Father     Diabetes Sister     Diabetes Brother     Other Son         GSW    No Known Problems Sister        Review of Systems:     Positive and Negative as described in HPI. Denies any shortness of breath or cough  Denies chest pain or palpitations  Denies diarrhea vomiting. Reports mild epigastric pain  Denies any new numbness tremors or weakness.     10 point review of systems was negative other than mentioned above    Physical Exam:     BP (!) 103/59   Pulse 95   Temp 97.9 °F (36.6 °C) (Temporal)   Resp 13   Ht 5' 5\" (1.651 m)   Wt 207 lb (93.9 kg)   LMP  (LMP Unknown)   PF (!) 16 L/min   BMI 34.45 kg/m²   Temp (24hrs), Av.6 °F (36.4 °C), Min:97.3 °F (36.3 °C), Max:97.9 °F (36.6 °C)    Recent Labs     22  1702 22  1939 22  0801 22  1130   POCGLU 221* 248* 142* 204*       No intake or output data in the 24 hours ending 22 1522    General Appearance: alert, well appearing, and in no acute distress  Head:  normocephalic, atraumatic. Eye: no icterus, redness, pupils equal and reactive, extraocular eye movements intact, conjunctiva clear  Ear: normal external ear, no discharge, hearing intact  Nose:  no drainage noted  Mouth: mucous membranes moist  Neck: supple, no carotid bruits, thyroid not palpable  Lungs: Bilateral equal air entry, clear to ausculation, no wheezing, rales or rhonchi, normal effort  Cardiovascular: normal rate, regular rhythm, no murmur, gallop, rub. Abdomen: Soft, nontender, nondistended, normal bowel sounds, no hepatomegaly or splenomegaly  Neurologic: difficult to assess, limited pt co-operation. Alert, oriented X 1-2, mobile, power equal all 4 limibs. Normal speech but abnormal content, confabulation present.   No focal neurology otherwise  Skin: No gross lesions, rashes, bruising or bleeding on exposed skin area  Extremities:  No joint swelling, no pedal edema or calf pain with palpation      Investigations:      Laboratory Testing:  Recent Results (from the past 24 hour(s))   POC Glucose Fingerstick    Collection Time: 09/24/22  4:04 PM   Result Value Ref Range    POC Glucose 241 (H) 65 - 105 mg/dL   POC Glucose Fingerstick    Collection Time: 09/24/22  5:02 PM   Result Value Ref Range    POC Glucose 221 (H) 65 - 105 mg/dL   POC Glucose Fingerstick    Collection Time: 09/24/22  7:39 PM   Result Value Ref Range    POC Glucose 248 (H) 65 - 105 mg/dL   POC Glucose Fingerstick    Collection Time: 09/25/22  8:01 AM   Result Value Ref Range    POC Glucose 142 (H) 65 - 105 mg/dL   POC Glucose Fingerstick    Collection Time: 09/25/22 11:30 AM   Result Value Ref Range    POC Glucose 204 (H) 65 - 105 mg/dL       Imaging/Diagonstics:  Recent data reviewed    Assessment :      Primary Problem  Unspecified psychosis not due to a substance or known physiological condition [facility])    Active Hospital Problems    Diagnosis Date Noted    Fentanyl use disorder, mild ([facility] [F11.10] 09/23/2022     Priority: Medium    Unspecified psychosis not due to a substance or known physiological condition ([facility] [F29] 09/22/2022     Priority: Medium    Cocaine use disorder ([facility] [F14.10] 01/05/2018         Plan:     Reason for consult: General medical management     Acute psychosis - management per psych-  DKA- resolved  Type 2 DM- BG lantus, SSI- BG controlled, resume statin, metformin  Acute renal failure- creat normalized  Acute toxic encephalopathy- still encephalopathic  Hypertension- currently off meds- BP controlled  Upper GI bleed, duodenal ulcers-  c/w PPI  Recent stroke- off ASA due to recent GI bleed- Hb 7.6 last, will repeat    Repeat labs ordered, including HIV and hep C per patient request    DVT prophylaxis-not required, patient is mobile    Uncontrolled diabetes increase Lantus to 17 twice a day      Consultations:   Christine Rodriguez MD  9/25/2022  3:22 PM    Copy sent to Layla Castañeda MD    Please note that this chart was generated using voice recognition Dragon dictation software. Although every effort was made to ensure the accuracy of this automated transcription, some errors in transcription may have occurred.

## 2022-09-25 NOTE — GROUP NOTE
Group Therapy Note    Date: 9/25/2022    Group Start Time: 1800  Group End Time: 1830  Group Topic: Community Meeting    DANIELLE Akhtar RN        Group Therapy Note    Attendees: 18/18       Patient's Goal:  clean out room of any old food, allow staff chance to check for contraband    Notes:  Sunday day-shift contraband check    Status After Intervention:  Unchanged    Participation Level: Minimal    Participation Quality: Appropriate      Speech:  normal      Thought Process/Content: Logical      Affective Functioning: Congruent      Mood: euthymic      Level of consciousness:  Alert and Oriented x4      Response to Learning: Able to verbalize/acknowledge new learning      Endings: None Reported    Modes of Intervention: Activity      Discipline Responsible: [facility]      Signature:  John Marshall

## 2022-09-25 NOTE — BH NOTE
Patient approached desk to apologize to staff member about earlier outburst. Pt stated she meant to say her blood pressure was low and became confused. Pt stated, \"despite that, It was not right how I spoke to you and a really apologize. \" Writer told pt it showed good insight for her to apologize and writer accepted/ appreciated apology.

## 2022-09-25 NOTE — PROGRESS NOTES
Physical Therapy        Physical Therapy Cancel Note      DATE: 2022    NAME: Christ Wen  MRN: [id number]   : 1965      Patient not seen this date for Physical Therapy due to:    2022 at 5- Therapist called the unit and spoke to staff member Nixon Abreu spoke to the patient to see if she would be agreeable to PT evaluation. Per Taya, pt refused PT evaluation and rolled over in bed. Will check status tomorrow.        Electronically signed by Vance Jaquez PT on 2022 at 1:10 PM

## 2022-09-25 NOTE — PROGRESS NOTES
Physical Therapy        Physical Therapy Cancel Note      DATE: 2022    NAME: Matthias Angel  MRN: [id number]   : 1965      Patient not seen this date for Physical Therapy due to:    2022- Therapist called the unit at 21 [phone] and spoke to staff member Singh Anderson who asked the patient if she would participate in the PT evaluation. Pt was agreeable to the evaluation at that time. After the therapist came to the unit ([phone]),  pt was found in bed. Pt refused PT evaluation stating that the therapist waited until she was back in bed and ready to sleep. Pt also C/O nausea. Pt  was laying in bed w/ blanket up over her head and would not remove it to talk to therapist other than to refuse PT evaluation 3 x even after therapist explained the purpose of the evaluation. Case discussed w/ nursing staff. Will attempt to see in the afternoon if time permits.        Electronically signed by Natalia Dunaway PT on 2022 at 9:30 AM

## 2022-09-26 LAB
GLUCOSE BLD-MCNC: 140 MG/DL (ref 65–105)
GLUCOSE BLD-MCNC: 248 MG/DL (ref 65–105)
GLUCOSE BLD-MCNC: 279 MG/DL (ref 65–105)
GLUCOSE BLD-MCNC: 331 MG/DL (ref 65–105)
HEPATITIS C ANTIBODY: NONREACTIVE
HIV AG/AB: NONREACTIVE

## 2022-09-26 PROCEDURE — 6370000000 HC RX 637 (ALT 250 FOR IP): Performed by: PSYCHIATRY & NEUROLOGY

## 2022-09-26 PROCEDURE — 6370000000 HC RX 637 (ALT 250 FOR IP): Performed by: INTERNAL MEDICINE

## 2022-09-26 PROCEDURE — 86803 HEPATITIS C AB TEST: CPT

## 2022-09-26 PROCEDURE — 82947 ASSAY GLUCOSE BLOOD QUANT: CPT

## 2022-09-26 PROCEDURE — 1240000000 HC EMOTIONAL WELLNESS R&B

## 2022-09-26 PROCEDURE — APPSS30 APP SPLIT SHARED TIME 16-30 MINUTES: Performed by: NURSE PRACTITIONER

## 2022-09-26 PROCEDURE — 99232 SBSQ HOSP IP/OBS MODERATE 35: CPT | Performed by: PSYCHIATRY & NEUROLOGY

## 2022-09-26 PROCEDURE — 87389 HIV-1 AG W/HIV-1&-2 AB AG IA: CPT

## 2022-09-26 PROCEDURE — 97530 THERAPEUTIC ACTIVITIES: CPT

## 2022-09-26 PROCEDURE — 36415 COLL VENOUS BLD VENIPUNCTURE: CPT

## 2022-09-26 RX ADMIN — PREGABALIN 75 MG: 75 CAPSULE ORAL at 20:58

## 2022-09-26 RX ADMIN — INSULIN LISPRO 1 UNITS: 100 INJECTION, SOLUTION INTRAVENOUS; SUBCUTANEOUS at 07:46

## 2022-09-26 RX ADMIN — INSULIN GLARGINE 17 UNITS: 100 INJECTION, SOLUTION SUBCUTANEOUS at 21:02

## 2022-09-26 RX ADMIN — METFORMIN HYDROCHLORIDE 1000 MG: 1000 TABLET ORAL at 17:19

## 2022-09-26 RX ADMIN — PANTOPRAZOLE SODIUM 40 MG: 40 TABLET, DELAYED RELEASE ORAL at 20:58

## 2022-09-26 RX ADMIN — HYDROXYZINE HYDROCHLORIDE 50 MG: 50 TABLET ORAL at 20:59

## 2022-09-26 RX ADMIN — ATORVASTATIN CALCIUM 40 MG: 20 TABLET, FILM COATED ORAL at 20:58

## 2022-09-26 RX ADMIN — OLANZAPINE 15 MG: 10 TABLET, ORALLY DISINTEGRATING ORAL at 20:58

## 2022-09-26 RX ADMIN — INSULIN LISPRO 4 UNITS: 100 INJECTION, SOLUTION INTRAVENOUS; SUBCUTANEOUS at 21:02

## 2022-09-26 RX ADMIN — PREGABALIN 75 MG: 75 CAPSULE ORAL at 07:44

## 2022-09-26 RX ADMIN — PANTOPRAZOLE SODIUM 40 MG: 40 TABLET, DELAYED RELEASE ORAL at 07:44

## 2022-09-26 RX ADMIN — METFORMIN HYDROCHLORIDE 1000 MG: 1000 TABLET ORAL at 07:44

## 2022-09-26 RX ADMIN — INSULIN GLARGINE 17 UNITS: 100 INJECTION, SOLUTION SUBCUTANEOUS at 07:45

## 2022-09-26 RX ADMIN — ACETAMINOPHEN 650 MG: 325 TABLET, FILM COATED ORAL at 07:44

## 2022-09-26 RX ADMIN — TRAZODONE HYDROCHLORIDE 50 MG: 50 TABLET ORAL at 20:58

## 2022-09-26 RX ADMIN — INSULIN LISPRO 2 UNITS: 100 INJECTION, SOLUTION INTRAVENOUS; SUBCUTANEOUS at 17:19

## 2022-09-26 RX ADMIN — INSULIN LISPRO 1 UNITS: 100 INJECTION, SOLUTION INTRAVENOUS; SUBCUTANEOUS at 11:55

## 2022-09-26 RX ADMIN — ACETAMINOPHEN 650 MG: 325 TABLET, FILM COATED ORAL at 21:00

## 2022-09-26 ASSESSMENT — PAIN SCALES - GENERAL
PAINLEVEL_OUTOF10: 5
PAINLEVEL_OUTOF10: 3
PAINLEVEL_OUTOF10: 1
PAINLEVEL_OUTOF10: 10

## 2022-09-26 ASSESSMENT — PAIN DESCRIPTION - LOCATION
LOCATION: ARM
LOCATION: ARM
LOCATION: GENERALIZED

## 2022-09-26 ASSESSMENT — PAIN DESCRIPTION - ORIENTATION: ORIENTATION: RIGHT

## 2022-09-26 NOTE — PROGRESS NOTES
09/26/22 1405   Encounter Summary   Encounter Overview/Reason  Spiritual/Emotional Needs   Service Provided For: Patient   Referral/Consult From: Rounding   Last Encounter  09/26/22   Complexity of Encounter Moderate   Begin Time 0130   End Time  0200   Total Time Calculated 30 min   Spiritual/Emotional needs   Type Spiritual Support   Behavioral Health    Type  Spirituality Group   Assessment/Intervention/Outcome   Assessment Calm; Hopeful;Coping   Intervention Active listening;Discussed relationship with God   Outcome Receptive;Engaged in conversation;Expressed Gratitude

## 2022-09-26 NOTE — PROGRESS NOTES
Daily Progress Note  9/26/2022    Patient Name: Topher Chacon: Acute psychosis         SUBJECTIVE:      Patient is seen today for a follow up assessment. Terri villasenor author who was standing at the desk, she wanted to immediately discuss discharge planning. She reports that she has her own apartment that she would like to return to. According to documentation she refused physical therapy yesterday, when asked about this she states \"they, at times when I am sleeping, and I am not going to get up, they can come back when I am awake\". She reports that she gets along in her apartment with a wheelchair. She encourages this Claribel Faith to contact her daughter or sister to verify that she can return to her apartment. Based on previous documentation, social work has had multiple conversations with her daughter about the potential for guardianship and 24-hour care. Her daughter did confirm in this documentation that she would be unable to take her home and it does not appear that she is able to return to her apartment. At this time no efforts to start the guardianship process have been completed, the daughter based on documentation had concerns regarding the cost.  There is an unclear discharge plan at present, Severo Bolivar also significantly lacks insight into the severity of her illness. She became easily frustrated when trying to remember the phone number of her sister. She was defensive and blamed this author for confusing her. When she found out that this Claribel Faith was not the discharging provider she became irritated and ended the interview.     Appetite:  [x] Adequate/Unchanged  [] Increased  [] Decreased      Sleep:       [] Adequate/Unchanged  [x] Fair  [] Poor      Group Attendance on Unit:   [] Yes   [] Selectively    [x] No    Compliant with scheduled medications: [x] Yes  [] No    Received emergency medications in past 24 hrs: [] Yes   [x] No    Medication Side Effects: Denies Mental Status Exam  Level of consciousness: Alert and awake   Appearance: Appropriate attire for setting, seated in chair, with fair  grooming and hygiene   Behavior/Motor: Somewhat approachable, interruptive when requesting to speak with this author  Attitude toward examiner: Fairly cooperative, attentive, intense eye contact  Speech: Loud, rapid, irritable tone  Mood: Irritable  Affect: Guarded, defensive, easily frustrated  Thought processes: Coherent, mostly linear  Thought content: Discharge focused, Denies homicidal ideation  Suicidal Ideation: Denies suicidal ideations, contracts for safety on the unit. Delusions: No evidence of delusions. Perceptual Disturbance: Does not appear to be responding to internal stimuli. Cognition: Oriented to person and location, disoriented to general circumstance  Memory: Impaired  Insight: poor   Judgement: poor       Data   height is 5' 5\" (1.651 m) and weight is 207 lb (93.9 kg). Her temporal temperature is 98.4 °F (36.9 °C). Her blood pressure is 100/79 and her pulse is 100. Her respiration is 14.    Labs:   Admission on 09/22/2022   Component Date Value Ref Range Status    POC Glucose 09/22/2022 135 (A)  65 - 105 mg/dL Final    POC Glucose 09/23/2022 168 (A)  65 - 105 mg/dL Final    WBC 09/23/2022 5.3  3.5 - 11.0 k/uL Final    RBC 09/23/2022 2.72 (A)  4.0 - 5.2 m/uL Final    Hemoglobin 09/23/2022 8.6 (A)  12.0 - 16.0 g/dL Final    Hematocrit 09/23/2022 26.8 (A)  36 - 46 % Final    MCV 09/23/2022 98.8  80 - 100 fL Final    MCH 09/23/2022 31.8  26 - 34 pg Final    MCHC 09/23/2022 32.2  31 - 37 g/dL Final    RDW 09/23/2022 14.9  11.5 - 14.9 % Final    Platelets 50/39/4300 434  150 - 450 k/uL Final    MPV 09/23/2022 7.8  6.0 - 12.0 fL Final    Glucose 09/23/2022 315 (A)  70 - 99 mg/dL Final    BUN 09/23/2022 13  6 - 20 mg/dL Final    Creatinine 09/23/2022 0.78  0.50 - 0.90 mg/dL Final    Calcium 09/23/2022 8.7  8.6 - 10.4 mg/dL Final    Sodium 09/23/2022 135  135 - 144 mmol/L Final    Potassium 09/23/2022 4.8  3.7 - 5.3 mmol/L Final    Chloride 09/23/2022 101  98 - 107 mmol/L Final    CO2 09/23/2022 24  20 - 31 mmol/L Final    Anion Gap 09/23/2022 10  9 - 17 mmol/L Final    Alkaline Phosphatase 09/23/2022 243 (A)  35 - 104 U/L Final    ALT 09/23/2022 10  5 - 33 U/L Final    AST 09/23/2022 8  <32 U/L Final    Total Bilirubin 09/23/2022 0.2 (A)  0.3 - 1.2 mg/dL Final    Total Protein 09/23/2022 6.8  6.4 - 8.3 g/dL Final    Albumin 09/23/2022 2.7 (A)  3.5 - 5.2 g/dL Final    GFR Non- 09/23/2022 >60  >60 mL/min Final    GFR  09/23/2022 >60  >60 mL/min Final    GFR Comment 09/23/2022        Final    Comment: Average GFR for 52-63 years old:   80 mL/min/1.73sq m  Chronic Kidney Disease:   <60 mL/min/1.73sq m  Kidney failure:   <15 mL/min/1.73sq m              eGFR calculated using average adult body mass. Additional eGFR calculator available at:        [employer]            POC Glucose 09/23/2022 222 (A)  65 - 105 mg/dL Final    POC Glucose 09/23/2022 286 (A)  65 - 105 mg/dL Final    POC Glucose 09/23/2022 237 (A)  65 - 105 mg/dL Final    POC Glucose 09/24/2022 185 (A)  65 - 105 mg/dL Final    POC Glucose 09/24/2022 177 (A)  65 - 105 mg/dL Final    POC Glucose 09/24/2022 241 (A)  65 - 105 mg/dL Final    POC Glucose 09/24/2022 221 (A)  65 - 105 mg/dL Final    POC Glucose 09/24/2022 248 (A)  65 - 105 mg/dL Final    Hepatitis C Ab 09/26/2022 NONREACTIVE  NONREACTIVE Final    Comment:       The hepatitis C procedure used in our laboratory is a Chemiluminescent test specific for   three recombinant HCV antigens. A negative anti-HCV result indicates that the antibodies to   hepatitis C virus are not present at this time. Individuals with reactive anti-HCV should be considered infected and infectious until proven   otherwise.   Confirmation of all equivocal or reactive results is recommended by ordering   HCV RNA by PCR.      POC Glucose 09/25/2022 142 (A)  65 - 105 mg/dL Final    POC Glucose 09/25/2022 204 (A)  65 - 105 mg/dL Final    POC Glucose 09/25/2022 255 (A)  65 - 105 mg/dL Final    POC Glucose 09/25/2022 224 (A)  65 - 105 mg/dL Final    HIV Ag/Ab 09/26/2022 NONREACTIVE  NONREACTIVE Final    Comment: No laboratory evidence of HIV infection. If acute HIV infection is suspected, consider   testing for HIV-1 RNA. POC Glucose 09/26/2022 140 (A)  65 - 105 mg/dL Final    POC Glucose 09/26/2022 248 (A)  65 - 105 mg/dL Final    POC Glucose 09/26/2022 279 (A)  65 - 105 mg/dL Final         Reviewed patient's current plan of care and vital signs with nursing staff.     Labs reviewed: [x] Yes    Medications  Current Facility-Administered Medications: insulin glargine (LANTUS) injection vial 17 Units, 17 Units, SubCUTAneous, BID  atorvastatin (LIPITOR) tablet 40 mg, 40 mg, Oral, Nightly  metFORMIN (GLUCOPHAGE) tablet 1,000 mg, 1,000 mg, Oral, BID WC  glucagon (rDNA) injection 1 mg, 1 mg, SubCUTAneous, PRN  glucose chewable tablet 16 g, 4 tablet, Oral, PRN  OLANZapine zydis (ZYPREXA) disintegrating tablet 15 mg, 15 mg, Oral, Nightly  pantoprazole (PROTONIX) tablet 40 mg, 40 mg, Oral, BID  pregabalin (LYRICA) capsule 75 mg, 75 mg, Oral, BID  acetaminophen (TYLENOL) tablet 650 mg, 650 mg, Oral, Q6H PRN  ibuprofen (ADVIL;MOTRIN) tablet 400 mg, 400 mg, Oral, Q6H PRN  hydrOXYzine HCl (ATARAX) tablet 50 mg, 50 mg, Oral, TID PRN  traZODone (DESYREL) tablet 50 mg, 50 mg, Oral, Nightly PRN  polyethylene glycol (GLYCOLAX) packet 17 g, 17 g, Oral, Daily PRN  aluminum & magnesium hydroxide-simethicone (MAALOX) 200-200-20 MG/5ML suspension 30 mL, 30 mL, Oral, Q6H PRN  nicotine polacrilex (NICORETTE) gum 2 mg, 2 mg, Oral, Q2H PRN  haloperidol lactate (HALDOL) injection 5 mg, 5 mg, IntraMUSCular, Q6H PRN **AND** LORazepam (ATIVAN) injection 2 mg, 2 mg, IntraMUSCular, Q6H PRN **AND** diphenhydrAMINE (BENADRYL) injection 50 mg, 50 mg, IntraMUSCular, Q6H PRN  insulin lispro (HUMALOG) injection vial 0-4 Units, 0-4 Units, SubCUTAneous, TID WC  insulin lispro (HUMALOG) injection vial 0-4 Units, 0-4 Units, SubCUTAneous, Nightly    ASSESSMENT  Unspecified psychosis not due to a substance or known physiological condition [facility])         HANDOFF  Patient symptoms are: Slowly improving  Medications as determined by attending physician  Encourage participation in groups and milieu. Probable discharge is to be determined by MD    Electronically signed by LAILA Cabrales CNP on 9/26/2022 at 5:47 PM       I independently saw and evaluated the patient. I reviewed the nurse practioner's documentation above. Any additional comments or changes to the    documentation are stated below otherwise agree with assessment. The patient was seen in the day area. He is in a wheelchair. He is requesting discharge. He tells me that she has no problems with her memory. She states she knows that she is at the [employer] [facility] [address] and she knows the name of the president which is Tiki. I informed her that she was at the hospital and not at a hotel and the president was Layla. The patient said that she knows this but calls Senait Chávez because Geovanna Pearl was the last president. The patient's insight is very poor. She is unlikely to be able to function in the community with her current cognition        PLAN  Medications as noted above  Attempt to develop insight  Expected Length of Stay is indeterminate days. Psycho-education conducted. Supportive Therapy conducted.   Follow-up daily while on inpatient unit     Electronically signed by Lawyer Shailesh MD on 9/26/22 at 11:41 AM ERICK

## 2022-09-26 NOTE — PLAN OF CARE
Problem: Anxiety  Goal: Will report anxiety at manageable levels  Description: INTERVENTIONS:  1. Administer medication as ordered  2. Teach and rehearse alternative coping skills  3. Provide emotional support with 1:1 interaction with staff  Outcome: Progressing  Pt relates to anxiety. Pt. Is medication compliant. Often irritable. Controlled when out in milieu. Problem: Confusion  Goal: Confusion, delirium, dementia, or psychosis is improved or at baseline  Description: INTERVENTIONS:  1. Assess for possible contributors to thought disturbance, including medications, impaired vision or hearing, underlying metabolic abnormalities, dehydration, psychiatric diagnoses, and notify attending LIP  2. [place] high risk fall precautions, as indicated  3. Provide frequent short contacts to provide reality reorientation, refocusing and direction  4. Decrease environmental stimuli, including noise as appropriate  5. Monitor and intervene to maintain adequate nutrition, hydration, elimination, sleep and activity  6. If unable to ensure safety without constant attention obtain sitter and review sitter guidelines with assigned personnel  7. Initiate Psychosocial CNS and Spiritual Care consult, as indicated  Outcome: Progressing  Pt has been irritable at times. Food focused. Tries several times to take bags of chips off peer's trays. Non compliant with diabetic diet. Problem: Safety - Adult  Goal: Free from fall injury  Outcome: Progressing  Pt remains free of falls. Up to wheelchair. Pt. Remains safe on the unit. Q 15 minute checks for safety maintained. Problem: Pain  Goal: Verbalizes/displays adequate comfort level or baseline comfort level  Outcome: Progressing  Pt. Relates to generalized pain all over. Will continue to monitor.       Problem: Self Harm/Suicidality  Goal: Will have no self-injury during hospital stay  Description: INTERVENTIONS:  1. Q 30 MINUTES: Routine safety checks  2. Q SHIFT & PRN: Assess risk to determine if routine checks are adequate to maintain patient safety  Outcome: Progressing  Pt denies suicidal thoughts. Pt. Remains safe on the unit. Q 15 minute checks for safety maintained.

## 2022-09-26 NOTE — PROGRESS NOTES
[address]   INPATIENT OCCUPATIONAL THERAPY  PROGRESS NOTE  Date: 2022  Patient Name: Isiah Solomon       Room: 6285/8834-65  MRN: [id number]    : 1965  (56 y.o.)  Gender: female      Diagnosis: psychosis, DM, Recent hx: DKA, Acute toxic encephalopathy , upper GI bleed. PMHx: lupus, DDD, White matter changes, CVA, bipolar disorder and PTSD, polysubstance abuse. treated for DKA with IV insulin, acute renal failure with substantial hydration, and hypovolemic shock requiring Levophed, severe hypernatremia >170, concern for GI bleed status post EGD which showed multiple duodenal ulcers treated with PPI. Patient remained confused and delusional, CT head negative, MRI could not be done due to lack of patient cooperation    Restrictions/Precautions  Restrictions/Precautions  Restrictions/Precautions: Fall Risk  Position Activity Restriction  Other position/activity restrictions: will benefit from patient directed therapy approach- easily agitated. per chart- pt has wounds R foot, abdomen, bottom, under B breasts. Subjective  Subjective  Subjective: \"I'll do whatever you ask me to\"       Objective  Cognition  Overall Cognitive Status: Exceptions  Arousal/Alertness: Appropriate responses to stimuli  Following Commands:  Follows one step commands consistently (when pt agrees)  Attention Span:  (able to maintain attention on chosen tasks)  Memory: Decreased short term memory;Decreased recall of recent events;Decreased long term memory  Safety Judgement: Decreased awareness of need for assistance;Decreased awareness of need for safety  Problem Solving: Assistance required to identify errors made;Assistance required to generate solutions;Assistance required to implement solutions;Assistance required to correct errors made;Decreased awareness of errors  Insights: Decreased awareness of deficits  Initiation: Does not require cues  Sequencing: Requires cues for some    ADL  Feeding: Independent  Grooming: Supervision  UE Bathing: Supervision  LE Bathing: Moderate assistance  UE Dressing: Supervision  LE Dressing: Moderate assistance  Toileting: Minimal assistance  Additional Comments: ADLs based on [facility]la reasoning. Balance       Transfers/Mobility  Transfers  Sit to stand: Stand by assistance  Stand to sit: Stand by assistance  Transfer Comments: Pt uses w/c. does no follow cues for locking w/c barkes prior to standing. Functional Mobility  Functional - Mobility Device: No device  Activity:  (few feet around common area)  Assist Level: Stand by assistance  Functional Mobility Comments: slightly unsteady. furniture walks when able. Would possibly benefit from use of walker. Functional Activities  OT Exercises  Exercise Treatment: BRAUN attempted to facilitated pt in various tasks for attention to task and functional particpation in ADL/ADLs. Pt declines playing cards. Begins BUE exercises and then states \"this is boring\". Pt then agreeable to functional mobility, pt stands from w.c, then leans on pillar for rest break, then takes 3-4 steps to wall and lean on wall, then reports she needs to go back to w.c due to fatigue. Pt declines w/c follow or more functional mobility. Education provided on safety with functional mobility and transfer with fair undertsanding noted. Pt reports she has walkers at home she utilizes when needed. Patient Education  Patient Education  Education Given To: Patient  Education Provided: Role of Therapy, Plan of Care  Education Method: Demonstration, Verbal  Barriers to Learning: Cognition  Education Outcome: Unable to demonstrate understanding      Goals  Patient Goals   Patient goals : pt does not feel she needs to show anyone she can do things ie. stand. she did live home indep and had 4WW, now using w/c, not ambulating.   Short Term Goals  Time Frame for Short term goals: 1 week  Short Term Goal 1: pt to dudley 3-5 min standing during adls with 1 UE support and CGA. Short Term Goal 2: mod indep with toilet transfer (from w/c with BSC over toilet)  (if allowed on BHI)  Short Term Goal 3: set up bathe and dress with good cognition, safety and balance  Short Term Goal 4: pt to engage in leisure activity of her choice during OT and demo fair attention to task for 8 minutes. Short Term Goal 5: pt to ambulate with least restrictive device and CGA during adls ie. from bed to toilet and reverse (approx 20 feet)  Plan  Times per Week: 3-5  Current Treatment Recommendations: ROM, Strengthening, Balance training, Functional mobility training, Safety education & training, Self-Care / ADL, Home management training, Endurance training, Patient/Caregiver education & training, Equipment evaluation, education, & procurement, Cognitive/Perceptual training      Assessment  Activity Tolerance  Activity Tolerance: Patient Tolerated treatment well (limited by attention span and wanting to get back to watching TV)  Activity Tolerance Comments: some components of eval not able to be formally assessed due to pt agitation. conversation, observation and pt directed approach was used. a break between therapy attempts was utilized. pt yelling at therapist during 2nd attempt to engage pt in mobility assessment, attempt to increase pt awareness of need to address mobility deficits not successful. RN intervened and told pt she could decline and pt calmed down and later apologized. pt also refused to talk to NP. OT let NP know re pt complaint of R sided pain especially LE hip to foot. Assessment  Performance deficits / Impairments: Decreased functional mobility , Decreased endurance, Decreased ADL status, Decreased balance, Decreased safe awareness, Decreased high-level IADLs, Decreased cognition  Prognosis: Guarded (poor cognition and cooperation limiting)  Decision Making: High Complexity  History: psychosis, DM, Recent hx: DKA, Acute toxic encephalopathy , upper GI bleed.    PMHx: lupus, DDD, White matter changes, CVA, bipolar disorder and PTSD, polysubstance abuse. treated for DKA with IV insulin, acute renal failure with substantial hydration, and hypovolemic shock requiring Levophed, severe hypernatremia >170, concern for GI bleed status post EGD which showed multiple duodenal ulcers treated with PPI.   Patient remained confused and delusional, CT head negative, MRI could not be done due to lack of patient cooperation  Exam: 7 performance deficits  Assistance / Modification: high due to cognition, mobility, pain, agitation  OT Equipment Recommendations  Other: may benefit from bathroom equipment      AM-PAC Daily Activities Inpatient  AM-PAC Daily Activity Inpatient   How much help for putting on and taking off regular lower body clothing?: A Lot  How much help for Bathing?: A Lot  How much help for Toileting?: A Little  How much help for putting on and taking off regular upper body clothing?: A Little  How much help for taking care of personal grooming?: A Little  How much help for eating meals?: None  AM-PAC Inpatient Daily Activity Raw Score: 17  AM-PAC Inpatient ADL T-Scale Score : 37.26  ADL Inpatient CMS 0-100% Score: 50.11  ADL Inpatient CMS G-Code Modifier : CK    OT Minutes  OT Individual Minutes  Time In: 1417  Time Out: [address]  Minutes: [address] [place], BRAUN/L

## 2022-09-26 NOTE — PLAN OF CARE
Problem: Anxiety  Goal: Will report anxiety at manageable levels  Description: INTERVENTIONS:  1. Administer medication as ordered  2. Teach and rehearse alternative coping skills  3. Provide emotional support with 1:1 interaction with staff  Outcome: Progressing     Problem: Self Harm/Suicidality  Goal: Will have no self-injury during hospital stay  Description: INTERVENTIONS:  1. Q 30 MINUTES: Routine safety checks  2. Q SHIFT & PRN: Assess risk to determine if routine checks are adequate to maintain patient safety  Outcome: Progressing     Pt is cooperative with staff and social with select peers. Pt denies all depression, auditory/visual hallucinations, and suicidal ideations. Pt does report anxiety and states it is because she has so much to do outside of here. Pt is very discharge focused and states she feels safe and is ready to go home.

## 2022-09-26 NOTE — GROUP NOTE
Group Therapy Note    Date: 9/26/2022    Group Start Time: 1430  Group End Time: 4377  Group Topic: Cognitive Skills    [facility]    CHRISTINA Borja        Group Therapy Note    Attendees: 7/12     Patient's Goal:  To increase social interaction and to practice expressing feelings, and explore positive coping, resources, and communication skills           Notes: Pt attended group and participated fully in group task et discussion. Pt was able to practice expressing feelings, and explore positive coping, resources, and communication skills through creative expression and discussion. Pt shared individually and was supportive of peers, and able to relate to some of their ideas and feelings,pt was accepting of peers' support in group. Status After Intervention: Improved     Participation Level: Attentive, sharing, supportive     Participation Quality:  Active listening, sharing, supportive     Speech:  Normal     Thought Process/Content: Logical et linear     Affective Functioning: Bunted, brightens     Mood: Pt was initially apathetic and impatient r/t task. Pt was encouraged to explore topic in detail but was resistant. However, when peers shared individually pt suddenly became petulant that \"You guys shut me down\" and chose not to share. With prompts from RT pt did choose to share and was able to identify positives and stop petulant behavior. Pt seeks to be center of attention  in group. Euthymic after sharing.      Level of consciousness:  Alert, and Attentive        Response to Learning:  Able to verbalize current knowledge, able to verbalize/acknowledge new learning, able to identify insight, and Progressing to goal        Endings: None Reported     Modes of Intervention: Education, Support, Socialization, Exploration, Clarifying and Problem-solving        Discipline Responsible: Psychoeducational Specialist        Signature: Chong Riedel

## 2022-09-26 NOTE — PLAN OF CARE
Problem: Safety - Adult  Goal: Free from fall injury  9/25/2022 2154 by Jackie Bean  Outcome: Progressing  Note: Pt remains free of falls and verbalizes understanding of individual fall risks. Pt wearing non skid footwear and encouraged to seek out staff for any assistance needed. Problem: Self Harm/Suicidality  Goal: Will have no self-injury during hospital stay  Description: INTERVENTIONS:  1. Q 30 MINUTES: Routine safety checks  2. Q SHIFT & PRN: Assess risk to determine if routine checks are adequate to maintain patient safety  9/25/2022 2154 by Jackie Bean  Outcome: Progressing  Note: Pt denies thoughts of self harm and is agreeable to seeking out should thoughts of self harm arise. Safe environment maintained. Q15 minute checks for safety cont per unit policy. Will cont to monitor for safety and provides support and reassurance as needed.

## 2022-09-26 NOTE — GROUP NOTE
Group Therapy Note    Date: 9/26/2022    Group Start Time: 0900  Group End Time: 4848  Group Topic: Community Meeting    [facility] Azucena Merlin        Group Therapy Note    Attendees: 13/18       Patient's Goal:  Work on [employer]"    Notes:  controlled    Status After Intervention:  Unchanged    Participation Level: Active Listener    Participation Quality: Appropriate and Attentive      Speech:  normal      Thought Process/Content: Logical      Affective Functioning: Congruent      Mood:  stable      Level of consciousness:  Oriented x4      Response to Learning: Able to verbalize current knowledge/experience and Progressing to goal      Endings: None Reported    Modes of Intervention: Education, Support, and Socialization      Discipline Responsible: Behavorial Health Tech      Signature:   Azucena Merlin

## 2022-09-26 NOTE — GROUP NOTE
Group Therapy Note    Date: 9/26/2022    Group Start Time: 2030  Group End Time: 2100  Group Topic: Wrap-Up    [facility]av[facility]enc[facility]        Group Therapy Note    Attendees: 13       Patient's Goal:  Review goals for the day and wind down for the evening    Notes:  participated    Status After Intervention:  Improved    Participation Level:  Active Listener and Interactive    Participation Quality: Appropriate and Attentive      Speech:  normal      Thought Process/Content: Logical      Affective Functioning: Congruent      Mood: anxious and irritable      Level of consciousness:  Alert      Response to Learning: Able to verbalize current knowledge/experience and Progressing to goal      Endings: None Reported    Modes of Intervention: Education, Support, and Problem-solving      Discipline Responsible: Registered Nurse      Signature:  Jules Villavicencio

## 2022-09-26 NOTE — GROUP NOTE
Group Therapy Note    Date: 9/26/2022    Group Start Time: 1100  Group End Time: 1674  Group Topic: Cognitive Skills    [facility]    [place], [place]        Group Therapy Note    Attendees: 8/15     Patient's Goal:  To increase social interaction and to practice problem solving, and communication skills. Notes: Pt attended and participated fully in group task . Pt was able to practice problem solving, and communication skills. Status After Intervention: Improved     Participation Level: Active Listener,  sharing ,supportive     Participation Quality:  Attentive, sharing , supportive     Speech:  Normal     Thought Process/Content: Logical r/t task , needs prompts to return to task at times d/t engaging in side conversations. Pt is very social, and sings at intervals r/t saying goodbye to peers who are being discharged. Affective Functioning: Congruent, brightens during group task.  Pt recognizes this RT from past admissions at [place] [address], pt shares with RT after group that she has had a lot of losses and feels alone, needs to build a support team.     Mood: Euthymic     Level of consciousness:  Alert, and Attentive        Response to Learning:  Able to verbalize current knowledge , Able to acknowledge new learning, and Progressing to goal        Endings: None Reported     Modes of Intervention: Education, Support, Socialization, Exploration, Clarifying and Problem-solving        Discipline Responsible: Psychoeducational Specialist        Signature:  Prince Darby

## 2022-09-27 LAB
GLUCOSE BLD-MCNC: 196 MG/DL (ref 65–105)
GLUCOSE BLD-MCNC: 227 MG/DL (ref 65–105)
GLUCOSE BLD-MCNC: 295 MG/DL (ref 65–105)
GLUCOSE BLD-MCNC: 301 MG/DL (ref 65–105)
GLUCOSE BLD-MCNC: 311 MG/DL (ref 65–105)

## 2022-09-27 PROCEDURE — 6370000000 HC RX 637 (ALT 250 FOR IP): Performed by: INTERNAL MEDICINE

## 2022-09-27 PROCEDURE — 6370000000 HC RX 637 (ALT 250 FOR IP): Performed by: PSYCHIATRY & NEUROLOGY

## 2022-09-27 PROCEDURE — 99233 SBSQ HOSP IP/OBS HIGH 50: CPT | Performed by: PSYCHIATRY & NEUROLOGY

## 2022-09-27 PROCEDURE — 82947 ASSAY GLUCOSE BLOOD QUANT: CPT

## 2022-09-27 PROCEDURE — 97530 THERAPEUTIC ACTIVITIES: CPT

## 2022-09-27 PROCEDURE — 1240000000 HC EMOTIONAL WELLNESS R&B

## 2022-09-27 RX ORDER — OLANZAPINE 10 MG/1
15 TABLET ORAL NIGHTLY
Qty: 30 TABLET | Refills: 3 | Status: SHIPPED | OUTPATIENT
Start: 2022-09-27

## 2022-09-27 RX ORDER — TRAZODONE HYDROCHLORIDE 50 MG/1
50 TABLET ORAL NIGHTLY PRN
Qty: 30 TABLET | Refills: 0 | Status: SHIPPED | OUTPATIENT
Start: 2022-09-27

## 2022-09-27 RX ORDER — ATORVASTATIN CALCIUM 40 MG/1
40 TABLET, FILM COATED ORAL NIGHTLY
Qty: 90 TABLET | Refills: 3 | Status: SHIPPED | OUTPATIENT
Start: 2022-09-27 | End: 2022-12-26

## 2022-09-27 RX ORDER — PANTOPRAZOLE SODIUM 40 MG/1
40 TABLET, DELAYED RELEASE ORAL 2 TIMES DAILY
Qty: 30 TABLET | Refills: 3 | Status: SHIPPED | OUTPATIENT
Start: 2022-09-27

## 2022-09-27 RX ORDER — GLUCOSAMINE HCL/CHONDROITIN SU 500-400 MG
CAPSULE ORAL
Qty: 100 STRIP | Refills: 0 | Status: SHIPPED | OUTPATIENT
Start: 2022-09-27

## 2022-09-27 RX ADMIN — PANTOPRAZOLE SODIUM 40 MG: 40 TABLET, DELAYED RELEASE ORAL at 21:38

## 2022-09-27 RX ADMIN — IBUPROFEN 400 MG: 400 TABLET ORAL at 21:37

## 2022-09-27 RX ADMIN — METFORMIN HYDROCHLORIDE 1000 MG: 1000 TABLET ORAL at 08:16

## 2022-09-27 RX ADMIN — PREGABALIN 75 MG: 75 CAPSULE ORAL at 21:37

## 2022-09-27 RX ADMIN — INSULIN LISPRO 1 UNITS: 100 INJECTION, SOLUTION INTRAVENOUS; SUBCUTANEOUS at 12:17

## 2022-09-27 RX ADMIN — PANTOPRAZOLE SODIUM 40 MG: 40 TABLET, DELAYED RELEASE ORAL at 08:16

## 2022-09-27 RX ADMIN — OLANZAPINE 15 MG: 10 TABLET, ORALLY DISINTEGRATING ORAL at 21:38

## 2022-09-27 RX ADMIN — INSULIN LISPRO 4 UNITS: 100 INJECTION, SOLUTION INTRAVENOUS; SUBCUTANEOUS at 21:36

## 2022-09-27 RX ADMIN — TRAZODONE HYDROCHLORIDE 50 MG: 50 TABLET ORAL at 21:38

## 2022-09-27 RX ADMIN — INSULIN GLARGINE 17 UNITS: 100 INJECTION, SOLUTION SUBCUTANEOUS at 08:16

## 2022-09-27 RX ADMIN — HYDROXYZINE HYDROCHLORIDE 50 MG: 50 TABLET ORAL at 17:56

## 2022-09-27 RX ADMIN — METFORMIN HYDROCHLORIDE 1000 MG: 1000 TABLET ORAL at 17:42

## 2022-09-27 RX ADMIN — PREGABALIN 75 MG: 75 CAPSULE ORAL at 08:16

## 2022-09-27 RX ADMIN — INSULIN GLARGINE 17 UNITS: 100 INJECTION, SOLUTION SUBCUTANEOUS at 21:36

## 2022-09-27 RX ADMIN — ATORVASTATIN CALCIUM 40 MG: 20 TABLET, FILM COATED ORAL at 21:37

## 2022-09-27 RX ADMIN — HYDROXYZINE HYDROCHLORIDE 50 MG: 50 TABLET ORAL at 21:38

## 2022-09-27 ASSESSMENT — PAIN SCALES - GENERAL
PAINLEVEL_OUTOF10: 7
PAINLEVEL_OUTOF10: 5

## 2022-09-27 ASSESSMENT — PAIN - FUNCTIONAL ASSESSMENT: PAIN_FUNCTIONAL_ASSESSMENT: ACTIVITIES ARE NOT PREVENTED

## 2022-09-27 ASSESSMENT — PAIN DESCRIPTION - LOCATION: LOCATION: GENERALIZED

## 2022-09-27 ASSESSMENT — PAIN DESCRIPTION - DESCRIPTORS: DESCRIPTORS: ACHING

## 2022-09-27 NOTE — PROGRESS NOTES
CLINICAL PHARMACY NOTE: MEDS TO BEDS    Total # of Prescriptions Filled: 6   The following medications were delivered to the patient:  Atorvastatin Calcium 40mg  Metformin HCL 1000mg  Trazodone HCL 50mg  Pantoprazole Sodium 40mg  Olanzapine 10mg  Olanzapine 5mg    Additional Documentation:  Medication picked up by nursing staff

## 2022-09-27 NOTE — BH NOTE
Patient verbalized feeling anxious. Patient in irritable mood and feeling overwhelmed. Patient offered 1:1 talk with staff, quiet time in room, and oral PRN Atarax 50 mg to manage anxiety. Patient accepting of 1:1 talk with writer and PRN Atarax. Support provided. Will monitor.

## 2022-09-27 NOTE — DISCHARGE SUMMARY
DISCHARGE SUMMARY      Patient ID:  Pan Hill  [id number]  15 y.o.  1965    Admit date: 9/22/2022    Discharge date and time: 9/27/2022    Disposition: Home      Admitting Physician: Luz Leigh MD     Discharge Physician: Dr Jaz Gilmore MD    Admission Diagnoses: Psychosis, unspecified psychosis type ([facility] [F29]    Admission Condition: poor    Discharged Condition: stable    Admission Circumstance:  Pan Hill is a 56 y.o. female who has a past medical history of diabetes mellitus type 2, cerebral infarct, DKA, osteoarthritis, GERD, asthma, Warnicke's encephalopathy, fibromyalgia, lupus, migraine, neuropathy, hypertension, polysubstance use, alcohol use disorder, severe dependence, PTSD, depression with and without psychosis, and anxiety. Patient admitted to [facility] from [address] [place] [address] following admission on 9/8 with altered mental status. Authorities had been contacted by her family for a well check and EMS found the patient to be responsive to pain only. She was confused and had limited ability to respond to questions. Patient has previous admissions to [facility] but nothing since 2018. Patient was seen for initial evaluation today. Nursing staff report patient refused to sign paperwork upon admission and has been irritable and agitated on the unit at times. She has maintained medication adherence. She was resting in bed on approach. And completely covered by blanket. She responded to verbal stimuli but refused to make eye contact with writer. She was encouraged to sit up to engage in conversation, however she refused. She presented as withdrawn and was evasive with answers to assessment questions. She was irritable and stated \"ma'am I just do not feel like doing this right now can you come back tomorrow? \". She was irritable throughout much of attempted conversation. She was dismissive of writer.  She did talk with writer long enough to endorse ongoing symptoms of depression but refuses to identify what she has been experiencing. She is minimizing of current symptoms. Patient was asked to identify perceptual disturbances and she would say \"I am just tired I do not want to talk\". Patient is denying suicidal and homicidal ideation today. She also denies auditory and visual hallucinations but has recently been observed to be responding to internal stimuli. Per EMR patient has a previous diagnosis of bipolar disorder and PTSD. Writer attempts to screen patient for these disorders and that she is noncompliant at present. Patient refuses to participate in assessment any further. Due to patient's refusal to fully participate in assessment, much of documentation was completed via use of EMR. Patient has yet to demonstrate sustained stability and warrants hospitalization for safety and stabilization.          PAST MEDICAL/PSYCHIATRIC HISTORY:   Past Medical History:   Diagnosis Date    Acid reflux 5/29/2017    Acute cystitis with hematuria 10/11/2016    Acute non-recurrent maxillary sinusitis 11/28/2017    Asthma     Bipolar 1 disorder ([address] 1/4/2018    Bipolar disorder, mixed ([address]     BMI 34.0-34.9,adult 11/28/2017    Cannabis use disorder, severe, dependence ([address] 12/19/2017    Cerebrovascular accident (CVA) ([address] 6/14/2017    Chest pain 11/5/2016    Chronic renal insufficiency     Cocaine abuse ([address] 1/5/2018    COVID-19 virus RNA test result unknown     DDD (degenerative disc disease), cervical     Diabetes mellitus ([address]     Dizziness 6/13/2017    Fibromyalgia     History of stroke 9/9/2017    Homicidal ideation 11/6/2017    Hyperglycemia     Hypertension     Hypotension 1/18/2019    IDDM (insulin dependent diabetes mellitus) 12/21/2015    Lupus ([address]     Migraine     Neuropathy     Neuropathy     Polysubstance abuse ([address] 9/17/2017    Post traumatic stress disorder (PTSD)     Posttraumatic stress disorder 5/29/2017    Recurrent depression ([address] 5/29/2017    Recurrent major depressive disorder, in partial remission ([facility] 11/28/2017    Screening mammogram, encounter for 11/28/2017    Severe recurrent major depression with psychotic features ([facility] [facility] 12/19/2017    Severe recurrent major depression without psychotic features ([facility] [address] 12/19/2017    Stroke (cerebrum) ([facility] [address] [facility] 6/14/2017    St[facility]     per chart notes    Suicidal ideation     Suicidal intent 3/10/2017    Vitamin D deficiency 11/28/2017    White matter changes 6/13/2017       FAMILY/SOCIAL HISTORY:  Family History   Problem Relation Age of Onset    Diabetes Mother     Stroke Mother     Diabetes Father     Diabetes Sister     Diabetes Brother     Other Son         GSW    No Known Problems Sister      Social History     Socioeconomic History    Marital status: Legally      Spouse name: Not on file    Number of children: Not on file    Years of education: Not on file    Highest education level: Not on file   Occupational History    Not on file   Tobacco Use    Smoking status: Never    Smokeless tobacco: Never   Vaping Use    Vaping Use: Never used   Substance and Sexual Activity    Alcohol use: Not Currently    Drug use: Yes     Types: Marijuana (Charissa Perez), Cocaine     Comment: + Cocaine 2/2021 also, see Care Everywhere UDS    Sexual activity: Not Currently     Partners: Male   Other Topics Concern    Not on file   Social History Narrative    Not on file     Social Determinants of Health     Financial Resource Strain: Not on file   Food Insecurity: Not on file   Transportation Needs: Not on file   Physical Activity: Not on file   Stress: Not on file   Social Connections: Unknown    Frequency of Communication with Friends and Family: Once a week    Frequency of Social Gatherings with Friends and Family: Not on file    Attends Zoroastrian Services: Not on file    Active Member of Clubs or Organizations: No    Attends Club or Organization Meetings: Never    Marital Status:     Intimate Partner Violence: Not on file   Housing Stability: Not on file MEDICATIONS:    Current Facility-Administered Medications:     insulin glargine (LANTUS) injection vial 17 Units, 17 Units, SubCUTAneous, BID, Wilman Blanco MD, 17 Units at 09/27/22 0816    atorvastatin (LIPITOR) tablet 40 mg, 40 mg, Oral, Nightly, Brendolyn Rinne, MD, 40 mg at 09/26/22 2058    metFORMIN (GLUCOPHAGE) tablet 1,000 mg, 1,000 mg, Oral, BID WC, Brendolyn Rinne, MD, 1,000 mg at 09/27/22 0816    glucagon (rDNA) injection 1 mg, 1 mg, SubCUTAneous, PRN, Wilman Blanco MD    glucose chewable tablet 16 g, 4 tablet, Oral, PRN, Wilman Blanco MD    OLANZapine zydis (ZYPREXA) disintegrating tablet 15 mg, 15 mg, Oral, Nightly, Wilman Blanco MD, 15 mg at 09/26/22 2058    pantoprazole (PROTONIX) tablet 40 mg, 40 mg, Oral, BID, Wilman Blanco MD, 40 mg at 09/27/22 0816    pregabalin (LYRICA) capsule 75 mg, 75 mg, Oral, BID, Wilman Blanco MD, 75 mg at 09/27/22 0816    acetaminophen (TYLENOL) tablet 650 mg, 650 mg, Oral, Q6H PRN, Christofer Martins MD, 650 mg at 09/26/22 2100    ibuprofen (ADVIL;MOTRIN) tablet 400 mg, 400 mg, Oral, Q6H PRN, Christofer Martins MD, 400 mg at 09/25/22 0206    hydrOXYzine HCl (ATARAX) tablet 50 mg, 50 mg, Oral, TID PRN, Christofer Martins MD, 50 mg at 09/26/22 2059    traZODone (DESYREL) tablet 50 mg, 50 mg, Oral, Nightly PRN, Christofer Martins MD, 50 mg at 09/26/22 2058    polyethylene glycol (GLYCOLAX) packet 17 g, 17 g, Oral, Daily PRN, Christofer Martins MD    aluminum & magnesium hydroxide-simethicone (MAALOX) 200-200-20 MG/5ML suspension 30 mL, 30 mL, Oral, Q6H PRN, Christofer Martins MD    nicotine polacrilex (NICORETTE) gum 2 mg, 2 mg, Oral, Q2H PRN, Christofer Martins MD    haloperidol lactate (HALDOL) injection 5 mg, 5 mg, IntraMUSCular, Q6H PRN **AND** LORazepam (ATIVAN) injection 2 mg, 2 mg, IntraMUSCular, Q6H PRN **AND** diphenhydrAMINE (BENADRYL) injection 50 mg, 50 mg, IntraMUSCular, Q6H PRN, Christofer Martins MD    insulin lispro (HUMALOG) injection vial 0-4 Units, 0-4 Units, SubCUTAneous, TID WC, Evonne Hernandez MD, 2 Units at 09/26/22 1719    insulin lispro (HUMALOG) injection vial 0-4 Units, 0-4 Units, SubCUTAneous, Nightly, Evonne Hernandez MD, 4 Units at 09/26/22 2102    Examination:  BP (!) 101/58   Pulse 91   Temp 99.1 °F (37.3 °C) (Temporal)   Resp 14   Ht 5' 5\" (1.651 m)   Wt 207 lb (93.9 kg)   LMP  (LMP Unknown)   PF (!) 14 L/min   BMI 34.45 kg/m²   Gait - steady    HOSPITAL COURSE[de-identified]  Following admission to the hospital, patient had a complete physical exam and blood work up. The patient was referred to Internal Medicine. Patient was monitored closely with suicide precaution  Patient was started on olanzapine  The patient reported improvement in her auditory hallucinations and paranoia. She continues to have significant memory difficulties. She showed some improved insight into her memory difficulties. The patient recognized that she had difficulty functioning day-to-day but said that she has been supported by her sisters, 1 of whom lives right next to her. The patient refuses to sign in to be a voluntary patient. She does not meet the criteria for being probated per [facility] revised code. Was encouraged to participate in group and other milieu activity  Patient started to feel better with this combination of treatment. Significant progress in the symptoms since admission. Mood is improved  The patient denies AVH or paranoid thoughts  The patient denies any hopelessness or worthlessness  No active SI/HI  Appetite:  [x] Normal  [] Increased  [] Decreased    Sleep:       [x] Normal  [] Fair       [] Poor            Energy:    [x] Normal  [] Increased  [] Decreased     SI [] Present  [x] Absent  HI  []Present  [x] Absent   Aggression:  [] yes  [] no  Patient is [x] able  [] unable to CONTRACT FOR SAFETY   Medication side effects(SE):  [x] None(Psych.  Meds.) [] Other      Mental Status Examination on discharge:    Level of consciousness:  within normal limits   Appearance:  well-appearing  Behavior/Motor:  no abnormalities noted  Attitude toward examiner:  attentive and good eye contact  Speech:  spontaneous, normal rate and normal volume   Mood: euthymic  Affect:  mood congruent  Thought processes:  linear, goal directed, and coherent   Thought content:  Suicidal Ideation:  denies suicidal ideation  Delusions:  no evidence of delusions  Perceptual Disturbance:  denies any perceptual disturbance  Cognition: Oriented to place and situation, oriented to person, not oriented to time  Concentration clinically adequate  Memory impaired recent memory  Insight good   Judgement fair   Fund of Knowledge limited      ASSESSMENT:  Patient symptoms are:  [] Well controlled  [x] Improving  [] Worsening  [] No change      Diagnosis:  Principal Problem:    Unspecified psychosis not due to a substance or known physiological condition (HCC)  Active Problems:    Fentanyl use disorder, mild ([facility])    Cocaine use disorder ([facility]  Resolved Problems:    * No resolved hospital problems. *      LABS:    No results for input(s): WBC, HGB, PLT in the last 72 hours. No results for input(s): NA, K, CL, CO2, BUN, CREATININE, GLUCOSE in the last 72 hours. No results for input(s): BILITOT, ALKPHOS, AST, ALT in the last 72 hours. Lab Results   Component Value Date/Time    BARBSCNU NEGATIVE 09/08/2022 05:29 PM    LABBENZ NEGATIVE 09/08/2022 05:29 PM    LABMETH NEGATIVE 09/08/2022 05:29 PM    PPXUR NOT REPORTED 11/04/2021 03:43 PM     Lab Results   Component Value Date/Time    TSH 1.10 08/04/2022 12:21 AM     No results found for: LITHIUM  No results found for: VALPROATE, CBMZ    RISK ASSESSMENT AT DISCHARGE: Low risk for suicide and homicide. Treatment Plan:  Reviewed current Medications with the patient. Education provided on the complaince with treatment. Risks, benefits, side effects, drug-to-drug interactions and alternatives to treatment were discussed.     Encourage patient to attend outpatient follow up appointment and therapy. Patient was advised to call the outpatient provider, visit the nearest ED or call 911 if symptoms are not manageable. Medication List        START taking these medications      OLANZapine 10 MG tablet  Commonly known as: ZyPREXA  Take 1.5 tablets by mouth nightly     pantoprazole 40 MG tablet  Commonly known as: PROTONIX  Take 1 tablet by mouth 2 times daily            CHANGE how you take these medications      blood glucose test strips  Test 3 times a day & as needed for symptoms of irregular blood glucose. Dispense sufficient amount for indicated testing frequency plus additional to accommodate PRN testing needs. What changed: Another medication with the same name was removed. Continue taking this medication, and follow the directions you see here.      traZODone 50 MG tablet  Commonly known as: DESYREL  Take 1 tablet by mouth nightly as needed for Sleep  What changed:   medication strength  how much to take  when to take this  reasons to take this            CONTINUE taking these medications      albuterol sulfate  (90 Base) MCG/ACT inhaler  Commonly known as: PROVENTIL;VENTOLIN;PROAIR  Inhale 2 puffs into the lungs every 4 hours as needed for Wheezing     Alcohol Swabs 70 % Pads  Use 1 swab 3 times daily as needed     atorvastatin 40 MG tablet  Commonly known as: LIPITOR  Take 1 tablet by mouth nightly     budesonide-formoterol 80-4.5 MCG/ACT Aero  Commonly known as: Symbicort  Inhale 2 puffs into the lungs 2 times daily     fluticasone 50 MCG/ACT nasal spray  Commonly known as: FLONASE  1 spray by Each Nostril route daily     [employer] Pen [place] 31G 31G X 8 MM Misc  Generic drug: Insulin Pen Needle  1 each by Does not apply route daily     Lancets Misc  1 each by Does not apply route 3 times daily     Lantus SoloStar 100 UNIT/ML injection pen  Generic drug: insulin glargine  Inject 30 Units into the skin 2 times daily     metFORMIN 1000 MG tablet  Commonly known as: GLUCOPHAGE  Take 1 tablet by mouth 2 times daily (with meals)     Misc. Devices Misc  1 PAIR OF DIABETIC SHOES (1 LEFT/ 1 RIGHT)  1-3 PAIRS OF INSERTS (LEFT/ RIGHT)            STOP taking these medications      acetaminophen 500 MG tablet  Commonly known as: TYLENOL     dicyclomine 10 MG capsule  Commonly known as: Bentyl     mirtazapine 7.5 MG tablet  Commonly known as: REMERON     mupirocin 2 % ointment  Commonly known as: BACTROBAN     venlafaxine 150 MG extended release capsule  Commonly known as: EFFEXOR XR               Where to Get Your Medications        These medications were sent to [facility] [address]  [facility], [address] [facility] [address]      Phone: [phone]   atorvastatin 40 MG tablet  blood glucose test strips  metFORMIN 1000 MG tablet  OLANZapine 10 MG tablet  pantoprazole 40 MG tablet  traZODone 50 MG tablet               Core Measures statement:   Not applicable      TIME SPENT - 28 MINUTES TO COMPLETE THE EVALUATION, DISCHARGE SUMMARY, MEDICATION RECONCILIATION AND FOLLOW UP CARE                                         Jamar Monroy is a 56 y.o. female being evaluated Eloy Early MD on 9/27/2022 at 12:04 PM    An electronic signature was used to authenticate this note. **This report has been created using voice recognition software. It may contain minor errors which are inherent in voice recognition technology. **

## 2022-09-27 NOTE — PLAN OF CARE
Problem: Anxiety  Goal: Will report anxiety at manageable levels  Description: INTERVENTIONS:  1. Administer medication as ordered  2. Teach and rehearse alternative coping skills  3. Provide emotional support with 1:1 interaction with staff  Outcome: Progressing     Problem: Confusion  Goal: Confusion, delirium, dementia, or psychosis is improved or at baseline  Description: INTERVENTIONS:  1. Assess for possible contributors to thought disturbance, including medications, impaired vision or hearing, underlying metabolic abnormalities, dehydration, psychiatric diagnoses, and notify attending LIP  2. [place] high risk fall precautions, as indicated  3. Provide frequent short contacts to provide reality reorientation, refocusing and direction  4. Decrease environmental stimuli, including noise as appropriate  5. Monitor and intervene to maintain adequate nutrition, hydration, elimination, sleep and activity  6. If unable to ensure safety without constant attention obtain sitter and review sitter guidelines with assigned personnel  7. Initiate Psychosocial CNS and Spiritual Care consult, as indicated  Outcome: Progressing     Problem: Safety - Adult  Goal: Free from fall injury  Outcome: Progressing  Flowsheets (Taken 9/27/2022 1002)  Free From Fall Injury:   Instruct family/caregiver on patient safety   Based on caregiver fall risk screen, instruct family/caregiver to ask for assistance with transferring infant if caregiver noted to have fall risk factors  Note: Patient remains free from falls this shift. Patient wears non skid footwear and utilizes wheelchair. Safety maintained. Will monitor.      Problem: Pain  Goal: Verbalizes/displays adequate comfort level or baseline comfort level  Outcome: Progressing  Flowsheets (Taken 9/27/2022 1002)  Verbalizes/displays adequate comfort level or baseline comfort level:   Encourage patient to monitor pain and request assistance   Assess pain using appropriate pain scale Administer analgesics based on type and severity of pain and evaluate response  Note: Patient denied pain upon assessment. Will monitor. Problem: Self Harm/Suicidality  Goal: Will have no self-injury during hospital stay  Description: INTERVENTIONS:  1. Q 30 MINUTES: Routine safety checks  2. Q SHIFT & PRN: Assess risk to determine if routine checks are adequate to maintain patient safety  Outcome: Progressing  Note: Patient presents this shift with flat affect, maintains good eye contact. Patient engages in 1:1 talk but is guarded providing minimal responses. Patient reports continues with depressed mood but feels \" a little better \" today. Patient denies suicidal/homicidal/self harm ideations. Patient is compliant with medications and in behavioral control. Patient reports sleep is improving and appetite is good. Patient is out in day area and social with select peers. Encouragement and reassurance provided. Safety maintained with every 15 minute checks and as needed.

## 2022-09-27 NOTE — GROUP NOTE
Group Therapy Note    Date: 9/27/2022    Group Start Time: 0900  Group End Time: 6851  Group Topic: Community Meeting    [address] [facility] SHANNEN [facility] [address] [place] [address] Meeting Group Note        Date: 9/27/2022  Start Time: 9am  End Time:  4991      Number of Participants in Group & Unit Census:  7/15    Goal of Group:To discuss daily goals      Comments:     Patient did not participate in [employer] group, despite staff encouragement and explanation of benefits. Patient remain seclusive to self. Q15 minute safety checks maintained for patient safety and will continue to encourage patient to attend unit programming.

## 2022-09-27 NOTE — DISCHARGE INSTRUCTIONS
Information:  Medications:   Medication summary provided   I understand that I should take only the medications on my list.     -why and when I need to take each medicine.     -which side effects to watch for.     -that I should carry my medication information at all times in case of     Emergency situations. I will take all of my medicines to follow up appointments.     -check with my physician or pharmacist before taking any new    Medication, over the counter product or drink alcohol.    -Ask about food, drug or dietary supplement interactions.    -discard old lists and update records with medication providers. Notify Physician:  Notify physician if you notice:   Always call 911 if you feel your life is in danger  In case of an emergency call 911 immediately! If 911 is not available call your local emergency medical system for help    Behavioral Health Follow Up:  Original Referral Source:Medical  Discharge Diagnosis: Psychosis, unspecified psychosis [facility] [F[facility]]  Recommendations for Level of Care: Follow up appointments and medication compliance @ skilled nursing facility  Patient status at discharge: Patient denies thoughts of self harm. My hospital  was: [facility]  Aftercare plan faxed:    -faxed by: staff   -date: 10/13/22   -time: 1500  Prescriptions: Given to patient at time of discharge. Smoking: Quit Smoking. Call the [employer]'s smoking quitline at 4-136-50U-QUIT  Know the signs of a heart attack   If you have any of the following symptoms call 911 immediately, do not wait more    Than five minutes. 1. Pressure, fullness and/ or squeezing in the center of the chest spreading to    The jaw, neck or shoulder. 2. Chest discomfort with light headedness, fainting, sweating, nausea or    Shortness of breath. 3. Upper abdominal pressure or discomfort. 4. Lower chest pain, back pain, unusual fatigue, shortness of breath, nausea   Or dizziness.      General Information:   Questions

## 2022-09-27 NOTE — PROGRESS NOTES
[address] [facility]   INPATIENT OCCUPATIONAL THERAPY  PROGRESS NOTE  Date: 2022  Patient Name: Yojana Zheng       Room: 3464/0834-15  MRN: [id number]    : 1965  (56 y.o.)  Gender: female      Diagnosis: psychosis, DM, Recent hx: DKA, Acute toxic encephalopathy , upper GI bleed. PMHx: lupus, DDD, White matter changes, CVA, bipolar disorder and PTSD, polysubstance abuse. treated for DKA with IV insulin, acute renal failure with substantial hydration, and hypovolemic shock requiring Levophed, severe hypernatremia >170, concern for GI bleed status post EGD which showed multiple duodenal ulcers treated with PPI. Patient remained confused and delusional, CT head negative, MRI could not be done due to lack of patient cooperation    Restrictions/Precautions  Restrictions/Precautions  Restrictions/Precautions: Fall Risk  Required Braces or Orthoses?: No  Position Activity Restriction  Other position/activity restrictions: Will benefit from patient directed therapy approach- easily agitated. per chart- pt has wounds R foot, abdomen, bottom, under B breasts. Subjective  Subjective  Subjective: \"I'm going home today\" Pt seated in w/c in common area upon entry, agreeable to therapy and pleasant, however pt perseverated throughout session on going home. '  Comments: RN OK'd pt's participation in therapy session this date. Objective  Orientation  Overall Orientation Status:  (Not formally assessed this date; pt oriented to self and time of day)  Cognition  Overall Cognitive Status: Exceptions  Arousal/Alertness: Appropriate responses to stimuli  Following Commands: Follows one step commands consistently; Follows one step commands with increased time  Attention Span: Attends with cues to redirect  Memory: Decreased short term memory;Decreased recall of recent events;Decreased long term memory  Safety Judgement: Decreased awareness of need for assistance;Decreased awareness of need for safety  Problem Solving: Assistance required to identify errors made;Assistance required to generate solutions;Assistance required to implement solutions;Assistance required to correct errors made;Decreased awareness of errors  Insights: Decreased awareness of deficits  Initiation: Does not require cues  Sequencing: Requires cues for some  Patient affect[de-identified] Normal  ADL  Feeding: Independent  Grooming: Supervision  UE Bathing: Supervision  LE Bathing: Moderate assistance  UE Dressing: Supervision  LE Dressing: Moderate assistance  Toileting: Minimal assistance  Additional Comments: ADL scores based on clinical reasoning this date. Pt declining all ADLs in the afternoon, stating, \"I have already cleaned up and I'm going home today\". Pt's participation in ADLs limited d/t generalized weakness, decreased functional activity tolerance, and cognitive barriers. Balance  Balance  Sitting Balance: Supervision (while seated in w/c)  Standing Balance: Minimal assistance (with no AD to stand at w/c)  Standing Balance  Time: 15 secs  Activity: STS transfer trial in prep for functional mobility  Comment: Engaged pt in 1 STS transfer trial from w/ with MinAx1 and no AD. Pt tolerated standing ~15 secs before stating, \"I have to sit down, no way I am walking today\". Provided encouragement to pt on continued functional mobility trials at the standing level vs w/c level, with pt continuing to decline stating she has to use the w/c. Transfers/Mobility  Transfers  Sit to stand: Minimal assistance  Stand to sit: Minimal assistance  Transfer Comments: Engaged pt in 1 STS transfer trial in prep for ADL transfers/functional mobility. See Standing Balance section for further details.     Functional Mobility  Functional - Mobility Device: Wheelchair  Activity: Other (around BHI unit/common area)  Assist Level: Stand by assistance  Functional Mobility Comments: Facilitated functional mobility trial at the w/c level to promote UE strength and functional activity tolerance required for safety/IND with self care tasks. Pt able to propel self using BUEs and BLE assist PRN around unit/common area with SBA. Pt required Min VCs for hallway/obstacle negotiation, but overall demo'd Fair safety awareness with functional activity. Pt able to tolerate ~5 minutes of self propelling before requiring a rest break d/t increased fatigue. Functional Activities  OT Exercises  Exercise Treatment: Pt declining exercise activities this date, however agreeable to playing cards. OTR facilitated table top card activity in order to promote sustained attention and increase leisure opportunities for pt. Seated in w/c, pt engaged in table top card game for ~5 minutes before requesting game to be completed, as pt decided she did not enjoy the game. Pt demo'd Good understanding of rule of the game and Good sportsmanship throughout. Patient Education  Patient Education  Education Given To: Patient  Education Provided: Role of Therapy, Plan of Care, Transfer Training, Equipment, Fall Prevention Strategies, Precautions  Education Method: Verbal  Barriers to Learning: Cognition  Education Outcome: Continued education needed      Goals  Patient Goals   Patient goals : pt does not feel she needs to show anyone she can do things ie. stand. she did live home indep and had 4WW, now using w/c, not ambulating. Short Term Goals  Time Frame for Short term goals: 1 week  Short Term Goal 1: pt to dudley 3-5 min standing during adls with 1 UE support and CGA. Short Term Goal 2: mod indep with toilet transfer (from w/c with BSC over toilet)  (if allowed on BHI)  Short Term Goal 3: set up bathe and dress with good cognition, safety and balance  Short Term Goal 4: pt to engage in leisure activity of her choice during OT and demo fair attention to task for 8 minutes. Short Term Goal 5: pt to ambulate with least restrictive device and CGA during adls ie.  from bed to toilet and reverse (approx 20 feet)  Plan  Times per Week: 3-5  Current Treatment Recommendations: ROM, Strengthening, Balance training, Functional mobility training, Safety education & training, Self-Care / ADL, Home management training, Endurance training, Patient/Caregiver education & training, Equipment evaluation, education, & procurement, Cognitive/Perceptual training      Assessment  Activity Tolerance  Activity Tolerance: Patient Tolerated treatment well, Treatment limited secondary to decreased cognition  Activity Tolerance Comments: some components of eval not able to be formally assessed due to pt agitation. conversation, observation and pt directed approach was used. a break between therapy attempts was utilized. pt yelling at therapist during 2nd attempt to engage pt in mobility assessment, attempt to increase pt awareness of need to address mobility deficits not successful. RN intervened and told pt she could decline and pt calmed down and later apologized. pt also refused to talk to NP. OT let NP know re pt complaint of R sided pain especially LE hip to foot. Assessment  Performance deficits / Impairments: Decreased functional mobility , Decreased endurance, Decreased ADL status, Decreased balance, Decreased safe awareness, Decreased high-level IADLs, Decreased cognition  Treatment Diagnosis: Impaired self care status  Prognosis: Guarded (poor cognition and cooperation limiting)  Decision Making: High Complexity  History: psychosis, DM, Recent hx: DKA, Acute toxic encephalopathy , upper GI bleed. PMHx: lupus, DDD, White matter changes, CVA, bipolar disorder and PTSD, polysubstance abuse. treated for DKA with IV insulin, acute renal failure with substantial hydration, and hypovolemic shock requiring Levophed, severe hypernatremia >170, concern for GI bleed status post EGD which showed multiple duodenal ulcers treated with PPI.   Patient remained confused and delusional, CT head negative, MRI could not be done due to lack of patient cooperation  Exam: 7 performance deficits  Assistance / Modification: high due to cognition, mobility, pain, agitation  OT Equipment Recommendations  Other: TBD  Safety Devices  Type of Devices: Left in chair, Nurse notified, Gait belt (Left in w/c in common room; gait belt used during treatment and left unit with OTR at session end.)      AM-PAC Daily Activities Inpatient  AM-PAC Daily Activity Inpatient   How much help for putting on and taking off regular lower body clothing?: A Lot  How much help for Bathing?: A Lot  How much help for Toileting?: A Little  How much help for putting on and taking off regular upper body clothing?: A Little  How much help for taking care of personal grooming?: A Little  How much help for eating meals?: None  AM-PAC Inpatient Daily Activity Raw Score: 17  AM-PAC Inpatient ADL T-Scale Score : 37.26  ADL Inpatient CMS 0-100% Score: 50.11  ADL Inpatient CMS G-Code Modifier : CK    OT Minutes  OT Individual Minutes  Time In: 1335  Time Out: [place]  Minutes: 18  Time Code Minutes   Timed Code Treatment Minutes: [address], OT

## 2022-09-27 NOTE — BH NOTE
Patient refused blood sugar check. Writer provided education on benefits it provides. Patient continued to refuse. Will monitor.

## 2022-09-27 NOTE — GROUP NOTE
Group Therapy Note    Date: 9/27/2022    Group Start Time: 1430  Group End Time: 6767  Group Topic: Cognitive Skills    CHRISTINA Reese        Group Therapy Note    Attendees: 7/10     Patient's Goal:  To increase social interaction and to practice mood awareness and music,other techniques to redirect negative thoughts to positive, and communication skills. Notes: Pt was in and out of group, and was then pulled out by provider. Pt did not have opportunity to explore mood/coping skills. Pt did return to group after hearing bad news about housing and was tearful but in behavioral control. Pt was accepting of support by peers. Status After Intervention: Unchanged     Participation Level: sharing briefly,     Participation Quality:  sharing briefly     Speech:  preoccupied with recent housing news     Thought Process/Content: Restless, distracted before bad news, difficulty processing events leading to losing housing      Affective Functioning: Tearful with bad news     Mood: Dysthymic with bad news but remained in behavioral control. Pt heard on phone after group yelling \"I'm gonna throw a fit, b*tch got my damn money. Who's gonna make me shut up, come on make me\" r/t being evicted.      Level of consciousness:  Alert, and Attentive        Response to Learning:  Preoccupied with recent bad news, unable to acknowledge new learning      Endings: None Reported     Modes of Intervention: Education, Support, Socialization, Exploration, Clarifying and Problem-solving        Discipline Responsible: Psychoeducational Specialist        Signature:  Randy Martin

## 2022-09-27 NOTE — CARE COORDINATION
SOCIAL SERVICE NOTE: CLAYTON telephones PT daughter Parisa Lara on this date [phone] on this date to let her know that the doctor is discharging PT today. Pt daughter reports that PT has been evicted from her apartment and that she is in the process of applying for guardianship for PT and doesn't feel that PT would be safe if she is du=ischarged today. CLAYTON coordinates with Physician and with PT. PT is adamant that she still has an apartment and reports that she was not evicted from her [place] apartment. CLAYTON assists pt with pt permission with call [employer] at [phone] speak with Both Max Johnston and with Talib Tobar and Lisa with the [place] apartments let PT know that yes she was evcited from her apartment and they let ehr know that all of Pt's belongings have been removed from her apartment and that she no longer has an apartment at the [facility]. CLAYTONwitnesses this conversation. Max Johnston from the [place] also reminds pt that the day that [facility] showed up with the notice to vacate the apartment was the day that PT was found unresponsive in her apartment and was transferred to the hospital. Pt also witnesses this and acknowlesges this. SW provides PT with support and encouragement. Pt then reports that she has a safe place to live and reports that she can live with her sisters. Pt reports that she is going to contact her sisters and have them contact SW so that SW can verify that Pt has a safe place to go at discharge. Pt then returns to [address] [place] [address] office a few minutes later and says that she is unable to get a hold of anyone at this time that she can stay with. CLAYTON calls PT daughter Parisa Lara back with the permission from Patient. SW informs Parisa Lara that PT is going to stay at this time. Ananya reports that she is in the process of applying for Guardianship for PT and reports that she was told by [facility] that the Expert Evaluation form was completed by Pt Physician and was placed in PT chart.  SW will discuss this with PT physician. Pt daughter reports that she feels that PT needs to be evaluated for a extended care facility and pt has met with therapy department who have recommended that PT receive therapy following discharge. SW will complete PASSAR and continue to monitor the situation.

## 2022-09-27 NOTE — BH NOTE
PRN Follow up note:    Patient verbalized decrease in anxiety. Patient is pleasant and out in day area talking on phone. No sign of distress observed. Safety maintained.

## 2022-09-27 NOTE — GROUP NOTE
Group Therapy Note    Date: 9/27/2022    Group Start Time: 1100  Group End Time: 1397  Group Topic: Cognitive Skills    [facility] CHRISTINA May        Group Therapy Note    Attendees: 8/15       Patient's Goal:  To increase social interaction and to practice Decision making, and communication skills. Notes: Pt attended and participated fully in group task . Pt was able to practice Decision making, and communication skills. Pt needs prompts to stay on task d/t side conversations with a peer who is being discharged. Pt redirects well with humor, pt seeks attention at times with humor but easily redirects back to group task/topic. Status After Intervention: Improved     Participation Level: Active Listener,  sharing ,supportive     Participation Quality:  Attentive, sharing , supportive     Speech:  Normal     Thought Process/Content: Logical, needs prompts back to task/topic d/t socializing     Affective Functioning: Congruent, brightens     Mood: Euthymic, shares and initiates humor.      Level of consciousness:  Alert, and Attentive        Response to Learning:  Able to verbalize current knowledge , Able to acknowledge new learning, and Progressing to goal        Endings: None Reported     Modes of Intervention: Education, Support, Socialization, Exploration, Clarifying and Problem-solving        Discipline Responsible: Psychoeducational Specialist        Signature:  Giles Essex, CTRS

## 2022-09-28 PROBLEM — F29 UNSP PSYCHOSIS NOT DUE TO A SUBSTANCE OR KNOWN PHYSIOL COND (HCC): Status: ACTIVE | Noted: 2022-09-28

## 2022-09-28 LAB
GLUCOSE BLD-MCNC: 235 MG/DL (ref 65–105)
GLUCOSE BLD-MCNC: 248 MG/DL (ref 65–105)
GLUCOSE BLD-MCNC: 251 MG/DL (ref 65–105)
GLUCOSE BLD-MCNC: 267 MG/DL (ref 65–105)
GLUCOSE BLD-MCNC: 270 MG/DL (ref 65–105)
GLUCOSE BLD-MCNC: 323 MG/DL (ref 65–105)
GLUCOSE BLD-MCNC: 334 MG/DL (ref 65–105)
GLUCOSE BLD-MCNC: 423 MG/DL (ref 65–105)
GLUCOSE BLD-MCNC: 452 MG/DL (ref 65–105)
GLUCOSE BLD-MCNC: 475 MG/DL (ref 65–105)

## 2022-09-28 PROCEDURE — 99232 SBSQ HOSP IP/OBS MODERATE 35: CPT | Performed by: PSYCHIATRY & NEUROLOGY

## 2022-09-28 PROCEDURE — APPSS30 APP SPLIT SHARED TIME 16-30 MINUTES

## 2022-09-28 PROCEDURE — 1240000000 HC EMOTIONAL WELLNESS R&B

## 2022-09-28 PROCEDURE — 6370000000 HC RX 637 (ALT 250 FOR IP): Performed by: INTERNAL MEDICINE

## 2022-09-28 PROCEDURE — 6370000000 HC RX 637 (ALT 250 FOR IP): Performed by: NURSE PRACTITIONER

## 2022-09-28 PROCEDURE — 6370000000 HC RX 637 (ALT 250 FOR IP): Performed by: PSYCHIATRY & NEUROLOGY

## 2022-09-28 PROCEDURE — 82947 ASSAY GLUCOSE BLOOD QUANT: CPT

## 2022-09-28 RX ORDER — INSULIN LISPRO 100 [IU]/ML
0-4 INJECTION, SOLUTION INTRAVENOUS; SUBCUTANEOUS
Status: DISCONTINUED | OUTPATIENT
Start: 2022-09-28 | End: 2022-10-05

## 2022-09-28 RX ORDER — INSULIN LISPRO 100 [IU]/ML
4 INJECTION, SOLUTION INTRAVENOUS; SUBCUTANEOUS ONCE
Status: COMPLETED | OUTPATIENT
Start: 2022-09-28 | End: 2022-09-28

## 2022-09-28 RX ORDER — INSULIN LISPRO 100 [IU]/ML
0-4 INJECTION, SOLUTION INTRAVENOUS; SUBCUTANEOUS NIGHTLY
Status: DISCONTINUED | OUTPATIENT
Start: 2022-09-28 | End: 2022-10-05

## 2022-09-28 RX ADMIN — HYDROXYZINE HYDROCHLORIDE 50 MG: 50 TABLET ORAL at 20:59

## 2022-09-28 RX ADMIN — INSULIN GLARGINE 17 UNITS: 100 INJECTION, SOLUTION SUBCUTANEOUS at 20:57

## 2022-09-28 RX ADMIN — ATORVASTATIN CALCIUM 40 MG: 20 TABLET, FILM COATED ORAL at 20:59

## 2022-09-28 RX ADMIN — PANTOPRAZOLE SODIUM 40 MG: 40 TABLET, DELAYED RELEASE ORAL at 08:46

## 2022-09-28 RX ADMIN — ACETAMINOPHEN 650 MG: 325 TABLET, FILM COATED ORAL at 07:11

## 2022-09-28 RX ADMIN — TRAZODONE HYDROCHLORIDE 50 MG: 50 TABLET ORAL at 21:00

## 2022-09-28 RX ADMIN — IBUPROFEN 400 MG: 400 TABLET ORAL at 07:01

## 2022-09-28 RX ADMIN — PREGABALIN 75 MG: 75 CAPSULE ORAL at 08:46

## 2022-09-28 RX ADMIN — IBUPROFEN 400 MG: 400 TABLET ORAL at 20:59

## 2022-09-28 RX ADMIN — PREGABALIN 75 MG: 75 CAPSULE ORAL at 20:59

## 2022-09-28 RX ADMIN — HYDROXYZINE HYDROCHLORIDE 50 MG: 50 TABLET ORAL at 10:04

## 2022-09-28 RX ADMIN — INSULIN LISPRO 4 UNITS: 100 INJECTION, SOLUTION INTRAVENOUS; SUBCUTANEOUS at 21:07

## 2022-09-28 RX ADMIN — PANTOPRAZOLE SODIUM 40 MG: 40 TABLET, DELAYED RELEASE ORAL at 21:00

## 2022-09-28 RX ADMIN — INSULIN LISPRO 4 UNITS: 100 INJECTION, SOLUTION INTRAVENOUS; SUBCUTANEOUS at 18:12

## 2022-09-28 RX ADMIN — INSULIN LISPRO 4 UNITS: 100 INJECTION, SOLUTION INTRAVENOUS; SUBCUTANEOUS at 20:57

## 2022-09-28 RX ADMIN — ACETAMINOPHEN 650 MG: 325 TABLET, FILM COATED ORAL at 19:20

## 2022-09-28 RX ADMIN — METFORMIN HYDROCHLORIDE 1000 MG: 1000 TABLET ORAL at 18:12

## 2022-09-28 RX ADMIN — OLANZAPINE 15 MG: 10 TABLET, ORALLY DISINTEGRATING ORAL at 21:00

## 2022-09-28 ASSESSMENT — PAIN - FUNCTIONAL ASSESSMENT
PAIN_FUNCTIONAL_ASSESSMENT: 0-10
PAIN_FUNCTIONAL_ASSESSMENT: PREVENTS OR INTERFERES SOME ACTIVE ACTIVITIES AND ADLS

## 2022-09-28 ASSESSMENT — PAIN DESCRIPTION - LOCATION
LOCATION: GENERALIZED
LOCATION: GENERALIZED;LEG
LOCATION: GENERALIZED
LOCATION: GENERALIZED;LEG

## 2022-09-28 ASSESSMENT — PAIN SCALES - GENERAL
PAINLEVEL_OUTOF10: 7
PAINLEVEL_OUTOF10: 7
PAINLEVEL_OUTOF10: 9
PAINLEVEL_OUTOF10: 10

## 2022-09-28 ASSESSMENT — PAIN DESCRIPTION - DESCRIPTORS
DESCRIPTORS: ACHING;THROBBING

## 2022-09-28 ASSESSMENT — PAIN DESCRIPTION - ORIENTATION
ORIENTATION: LEFT
ORIENTATION: RIGHT

## 2022-09-28 NOTE — PROGRESS NOTES
09/28/22 1411   Encounter Summary   Encounter Overview/Reason  Spiritual/Emotional Needs   Service Provided For: Patient   Referral/Consult From: Zac   Last Encounter  09/28/22   Complexity of Encounter Moderate   Begin Time 0130   End Time  0200   Total Time Calculated 30 min   Spiritual/Emotional needs   Type Spiritual Support   Behavioral Health    Type  [facility]   Assessment/Intervention/Outcome   Assessment Calm;Tearful   Intervention Active listening;Prayer (assurance of)/[place];Sustaining Presence/Ministry of presence   Outcome Receptive;Engaged in conversation

## 2022-09-28 NOTE — PROGRESS NOTES
Patient refused blood sugar check at 0000. She is alert and oriented to self, place, time, and situation. She refused neuro checks also.

## 2022-09-28 NOTE — PROGRESS NOTES
Patient refused blood sugar check at 0200. She is alert and oriented to self, place, time, and situation. She refused neuro checks also.

## 2022-09-28 NOTE — PROGRESS NOTES
Daily Progress Note  9/27/2022    Patient Name: Zenobia Gauthier: Acute psychosis         SUBJECTIVE:      Patient is seen today for a follow up assessment. The patient was seen face to face. She has been feeling significantly to be voluntary patient and is requesting discharge. She feels that her sister lives nearby and will help her with her activities of daily living. The patient continues to have cognitive difficulties but states that she has not social support. The patient was discharged but we received a call from her daughter saying that she has been evicted and is no longer able to return to her previous residence. The patient said this is not true. We discussed that we do not have a safe place to discharge the patient without getting confirmation that she is able to access her previous residence. The patient agreed to sign in to be a voluntary patient    Appetite:  [x] Adequate/Unchanged  [] Increased  [] Decreased      Sleep:       [] Adequate/Unchanged  [x] Fair  [] Poor      Group Attendance on Unit:   [] Yes   [] Selectively    [x] No    Compliant with scheduled medications: [x] Yes  [] No    Received emergency medications in past 24 hrs: [] Yes   [x] No    Medication Side Effects: Denies          Mental Status Exam  Level of consciousness: Alert and awake   Appearance: Mobilizing with wheelchair  Behavior/Motor: Somewhat hostile  Attitude toward examiner: Irritable and demanding  Speech: Loud, rapid, irritable tone  Mood: Irritable  Affect: Guarded, defensive, easily frustrated  Thought processes: Coherent, mostly linear  Thought content: Discharge focused, Denies homicidal ideation  Suicidal Ideation: Denies suicidal ideations, contracts for safety on the unit. Delusions: No evidence of delusions. Perceptual Disturbance: Does not appear to be responding to internal stimuli.    Cognition: Oriented to person and location, disoriented to general circumstance  Memory: Impaired  Insight: poor   Judgement: poor       Data   height is 5' 5\" (1.651 m) and weight is 207 lb (93.9 kg). Her temporal temperature is 99.1 °F (37.3 °C). Her blood pressure is 101/58 (abnormal) and her pulse is 91. Her respiration is 14.    Labs:   Admission on 09/22/2022   Component Date Value Ref Range Status    POC Glucose 09/22/2022 135 (A)  65 - 105 mg/dL Final    POC Glucose 09/23/2022 168 (A)  65 - 105 mg/dL Final    WBC 09/23/2022 5.3  3.5 - 11.0 k/uL Final    RBC 09/23/2022 2.72 (A)  4.0 - 5.2 m/uL Final    Hemoglobin 09/23/2022 8.6 (A)  12.0 - 16.0 g/dL Final    Hematocrit 09/23/2022 26.8 (A)  36 - 46 % Final    MCV 09/23/2022 98.8  80 - 100 fL Final    MCH 09/23/2022 31.8  26 - 34 pg Final    MCHC 09/23/2022 32.2  31 - 37 g/dL Final    RDW 09/23/2022 14.9  11.5 - 14.9 % Final    Platelets 96/29/5253 434  150 - 450 k/uL Final    MPV 09/23/2022 7.8  6.0 - 12.0 fL Final    Glucose 09/23/2022 315 (A)  70 - 99 mg/dL Final    BUN 09/23/2022 13  6 - 20 mg/dL Final    Creatinine 09/23/2022 0.78  0.50 - 0.90 mg/dL Final    Calcium 09/23/2022 8.7  8.6 - 10.4 mg/dL Final    Sodium 09/23/2022 135  135 - 144 mmol/L Final    Potassium 09/23/2022 4.8  3.7 - 5.3 mmol/L Final    Chloride 09/23/2022 101  98 - 107 mmol/L Final    CO2 09/23/2022 24  20 - 31 mmol/L Final    Anion Gap 09/23/2022 10  9 - 17 mmol/L Final    Alkaline Phosphatase 09/23/2022 243 (A)  35 - 104 U/L Final    ALT 09/23/2022 10  5 - 33 U/L Final    AST 09/23/2022 8  <32 U/L Final    Total Bilirubin 09/23/2022 0.2 (A)  0.3 - 1.2 mg/dL Final    Total Protein 09/23/2022 6.8  6.4 - 8.3 g/dL Final    Albumin 09/23/2022 2.7 (A)  3.5 - 5.2 g/dL Final    GFR Non- 09/23/2022 >60  >60 mL/min Final    GFR  09/23/2022 >60  >60 mL/min Final    GFR Comment 09/23/2022        Final    Comment: Average GFR for 52-63 years old:   80 mL/min/1.73sq m  Chronic Kidney Disease:   <60 mL/min/1.73sq m  Kidney failure:   <15 mL/min/1.73sq m eGFR calculated using average adult body mass. Additional eGFR calculator available at:        [employer]            POC Glucose 09/23/2022 222 (A)  65 - 105 mg/dL Final    POC Glucose 09/23/2022 286 (A)  65 - 105 mg/dL Final    POC Glucose 09/23/2022 237 (A)  65 - 105 mg/dL Final    POC Glucose 09/24/2022 185 (A)  65 - 105 mg/dL Final    POC Glucose 09/24/2022 177 (A)  65 - 105 mg/dL Final    POC Glucose 09/24/2022 241 (A)  65 - 105 mg/dL Final    POC Glucose 09/24/2022 221 (A)  65 - 105 mg/dL Final    POC Glucose 09/24/2022 248 (A)  65 - 105 mg/dL Final    Hepatitis C Ab 09/26/2022 NONREACTIVE  NONREACTIVE Final    Comment:       The hepatitis C procedure used in our laboratory is a Chemiluminescent test specific for   three recombinant HCV antigens. A negative anti-HCV result indicates that the antibodies to   hepatitis C virus are not present at this time. Individuals with reactive anti-HCV should be considered infected and infectious until proven   otherwise. Confirmation of all equivocal or reactive results is recommended by ordering   HCV RNA by PCR. POC Glucose 09/25/2022 142 (A)  65 - 105 mg/dL Final    POC Glucose 09/25/2022 204 (A)  65 - 105 mg/dL Final    POC Glucose 09/25/2022 255 (A)  65 - 105 mg/dL Final    POC Glucose 09/25/2022 224 (A)  65 - 105 mg/dL Final    HIV Ag/Ab 09/26/2022 NONREACTIVE  NONREACTIVE Final    Comment: No laboratory evidence of HIV infection. If acute HIV infection is suspected, consider   testing for HIV-1 RNA.       POC Glucose 09/26/2022 140 (A)  65 - 105 mg/dL Final    POC Glucose 09/26/2022 248 (A)  65 - 105 mg/dL Final    POC Glucose 09/26/2022 279 (A)  65 - 105 mg/dL Final    POC Glucose 09/26/2022 331 (A)  65 - 105 mg/dL Final    POC Glucose 09/27/2022 196 (A)  65 - 105 mg/dL Final    POC Glucose 09/27/2022 227 (A)  65 - 105 mg/dL Final    POC Glucose 09/27/2022 311 (A)  65 - 105 mg/dL Final         Reviewed times today.      Electronically signed by Katie Khoury MD on 9/27/2022 at 9:47 PM

## 2022-09-28 NOTE — BH NOTE
Telephone call placed to Dr. Jean Claude Martin to inform of patient elevated blood sugar, and previous orders to hold humalog, and oral diabetic agents. No new orders given, as he will \"review\".

## 2022-09-28 NOTE — GROUP NOTE
Group Therapy Note    Date: 9/28/2022    Group Start Time: 0900  Group End Time: 0930  Group Topic: Community Meeting    DANIELLE MARROQUIN    Kelly Nielson LPN        Group Therapy Note    Attendees: 5/16       Patient's Goal:  Remain Positive        Status After Intervention:  Improved    Participation Level:  Active Listener and Interactive    Participation Quality: Appropriate, Attentive, Sharing, and Supportive      Speech:  normal      Thought Process/Content: Logical      Affective Functioning: Flat      Mood: anxious and depressed      Level of consciousness:  Alert, Oriented x4, and Attentive      Response to Learning: Able to verbalize current knowledge/experience, Able to verbalize/acknowledge new learning, Able to retain information, Capable of insight, Able to change behavior, and Progressing to goal      Endings: None Reported    Modes of Intervention: Education, Support, and Socialization      Discipline Responsible: Licensed Practical Nurse      Signature:  Kelly Nielson LPN

## 2022-09-28 NOTE — BH NOTE
Telephone call placed to Dr. Peña Seek as no new orders was given or seen, Instructed to resume Unheld orders.

## 2022-09-28 NOTE — PROGRESS NOTES
Provider notified of wrong dose of Lantus being administered. At 2136 Bedside RN accidentally administered 100 units at of Lantus instead of 17 units. Last glucose level at 2155 was 295. Spoke with pharmacist, there is no specific protocol for this scenario. Developed plan for hourly neuro checks and hourly POCT glucose checks for the next 12 hours. If glucose is stable on 9/28 at 1000 glucose levels and neuro checks can be completed every 4 hours for 36 hours. Reviewed patient's recent glucose levels, readings are typically in the 200-300 range. Goal will be to keep glucose level around 200 for the next 48 hours. Bedside RN instructed to ensure patient is given glucose containing food/drink anytime reading is below 200. If glucose level drops to 100-150, glucose tabs should be initiated. If below 100, bedside RN to contact provider for further instructions. If any mental status changes are observed, bedside RN is administer IM glucagon and call to initiate transfer to medical unit.

## 2022-09-28 NOTE — BH NOTE
Patient is asymptomatic at this time, patient consumed meal tray. Patient is non compliant with diabetic diet as she continues to take food off other trays, and drinks increase amount of juices, despite redirection. Patient educated on diabetes management and important of compliance. Patient not receptive at this time.

## 2022-09-28 NOTE — PLAN OF CARE
Problem: Safety - Adult  Goal: Free from fall injury  9/28/2022 1305 by Thierry Gant LPN  Outcome: Progressing  Note: Patient has remained free from falls. Will continue to monitor. Problem: Self Harm/Suicidality  Goal: Will have no self-injury during hospital stay  Description: INTERVENTIONS:  1. Q 30 MINUTES: Routine safety checks  2. Q SHIFT & PRN: Assess risk to determine if routine checks are adequate to maintain patient safety  9/28/2022 1305 by Thierry Gant LPN  Outcome: Progressing  Note: Patient denies any current suicidal thoughts and agrees to seek out staff if thoughts arise. Patient depressed and anxious. Patient denies any hallucinations. Patient has been tearful and anxious during talk time. Patient has been out in the dayroom socializing with peers and watching television. Patient compliant with medications and staff.

## 2022-09-28 NOTE — PROGRESS NOTES
Daily Progress Note  9/28/2022    Patient Name: Sarah Braun: Acute psychosis         SUBJECTIVE:      Patient is seen today for a follow up assessment. Patient agreeable to assessment to own room. Patient reports \"I feel horrible\" due to her living situation. She states \"I lost my home and my belongings while here\". She is sad and tearful and irritable at times during assessment when discussing unknown housing conditions upon discharge. Writer discussed with the patient importance of working with  to help seek out other outpatient services and housing patient continues to endorse anxiety and depression due to her situation. As needed Atarax 50 mg p.o. last given on 928 at 1004. She reports to hearing voices \"all the time\" states hearing \"voices from God\". Patient reports paranoia that people are out to get her and she is not willing to elaborate. Patient is focused on living situation and states that she is \"mad about her situation\". When asked if medication effective she states unsure if it is effective, denies any medication side effects. Per nursing staff patient refused PT today and she was tearful earlier in the day as she was discussing the loss of her home. At this time the patient is not appropriate for a lower level of care. There is risk of decompensation and worsening.   Patient further hospitalization for safety and hospitalization     Appetite:  [x] Adequate/Unchanged  [] Increased  [] Decreased      Sleep:       [] Adequate/Unchanged  [x] Fair  [] Poor      Group Attendance on Unit:   [] Yes   [] Selectively    [x] No    Compliant with scheduled medications: [x] Yes  [] No    Received emergency medications in past 24 hrs: [] Yes   [x] No    Medication Side Effects: Denies          Mental Status Exam  Level of consciousness: Alert and awake   Appearance: Appropriate attire for setting, seated in wheelchair, with fair  grooming and hygiene Behavior/Motor: Approachable, no psychomotor abnormalities   Attitude toward examiner: Cooperative, attentive, good eye contact  Speech: spontaneous, rapid, and loud and irritable tone  Mood: Irritable  Affect: Guarded and easily frustrated  Thought processes: linear and goal directed   Thought content:  Denies homicidal ideation  Suicidal Ideation: Denies suicidal ideations,   Delusions: No evidence of delusions. Perceptual Disturbance: Patient does not appear to be responding to internal stimuli. Cognition: Oriented to self, location, and disorganized to situation   Memory: impaired recent memory  Insight: poor   Judgement: poor       Data   height is 5' 5\" (1.651 m) and weight is 207 lb (93.9 kg). Her temporal temperature is 99.1 °F (37.3 °C). Her blood pressure is 101/58 (abnormal) and her pulse is 91. Her respiration is 14.    Labs:   Admission on 09/22/2022   Component Date Value Ref Range Status    POC Glucose 09/22/2022 135 (A)  65 - 105 mg/dL Final    POC Glucose 09/23/2022 168 (A)  65 - 105 mg/dL Final    WBC 09/23/2022 5.3  3.5 - 11.0 k/uL Final    RBC 09/23/2022 2.72 (A)  4.0 - 5.2 m/uL Final    Hemoglobin 09/23/2022 8.6 (A)  12.0 - 16.0 g/dL Final    Hematocrit 09/23/2022 26.8 (A)  36 - 46 % Final    MCV 09/23/2022 98.8  80 - 100 fL Final    MCH 09/23/2022 31.8  26 - 34 pg Final    MCHC 09/23/2022 32.2  31 - 37 g/dL Final    RDW 09/23/2022 14.9  11.5 - 14.9 % Final    Platelets 90/88/8570 434  150 - 450 k/uL Final    MPV 09/23/2022 7.8  6.0 - 12.0 fL Final    Glucose 09/23/2022 315 (A)  70 - 99 mg/dL Final    BUN 09/23/2022 13  6 - 20 mg/dL Final    Creatinine 09/23/2022 0.78  0.50 - 0.90 mg/dL Final    Calcium 09/23/2022 8.7  8.6 - 10.4 mg/dL Final    Sodium 09/23/2022 135  135 - 144 mmol/L Final    Potassium 09/23/2022 4.8  3.7 - 5.3 mmol/L Final    Chloride 09/23/2022 101  98 - 107 mmol/L Final    CO2 09/23/2022 24  20 - 31 mmol/L Final    Anion Gap 09/23/2022 10  9 - 17 mmol/L Final    Alkaline Phosphatase 09/23/2022 243 (A)  35 - 104 U/L Final    ALT 09/23/2022 10  5 - 33 U/L Final    AST 09/23/2022 8  <32 U/L Final    Total Bilirubin 09/23/2022 0.2 (A)  0.3 - 1.2 mg/dL Final    Total Protein 09/23/2022 6.8  6.4 - 8.3 g/dL Final    Albumin 09/23/2022 2.7 (A)  3.5 - 5.2 g/dL Final    GFR Non- 09/23/2022 >60  >60 mL/min Final    GFR  09/23/2022 >60  >60 mL/min Final    GFR Comment 09/23/2022        Final    Comment: Average GFR for 52-63 years old:   80 mL/min/1.73sq m  Chronic Kidney Disease:   <60 mL/min/1.73sq m  Kidney failure:   <15 mL/min/1.73sq m              eGFR calculated using average adult body mass. Additional eGFR calculator available at:        [employer]            POC Glucose 09/23/2022 222 (A)  65 - 105 mg/dL Final    POC Glucose 09/23/2022 286 (A)  65 - 105 mg/dL Final    POC Glucose 09/23/2022 237 (A)  65 - 105 mg/dL Final    POC Glucose 09/24/2022 185 (A)  65 - 105 mg/dL Final    POC Glucose 09/24/2022 177 (A)  65 - 105 mg/dL Final    POC Glucose 09/24/2022 241 (A)  65 - 105 mg/dL Final    POC Glucose 09/24/2022 221 (A)  65 - 105 mg/dL Final    POC Glucose 09/24/2022 248 (A)  65 - 105 mg/dL Final    Hepatitis C Ab 09/26/2022 NONREACTIVE  NONREACTIVE Final    Comment:       The hepatitis C procedure used in our laboratory is a Chemiluminescent test specific for   three recombinant HCV antigens. A negative anti-HCV result indicates that the antibodies to   hepatitis C virus are not present at this time. Individuals with reactive anti-HCV should be considered infected and infectious until proven   otherwise. Confirmation of all equivocal or reactive results is recommended by ordering   HCV RNA by PCR.       POC Glucose 09/25/2022 142 (A)  65 - 105 mg/dL Final    POC Glucose 09/25/2022 204 (A)  65 - 105 mg/dL Final    POC Glucose 09/25/2022 255 (A)  65 - 105 mg/dL Final    POC Glucose 09/25/2022 224 (A)  65 - 105 mg/dL Final    HIV Ag/Ab 09/26/2022 NONREACTIVE  NONREACTIVE Final    Comment: No laboratory evidence of HIV infection. If acute HIV infection is suspected, consider   testing for HIV-1 RNA. POC Glucose 09/26/2022 140 (A)  65 - 105 mg/dL Final    POC Glucose 09/26/2022 248 (A)  65 - 105 mg/dL Final    POC Glucose 09/26/2022 279 (A)  65 - 105 mg/dL Final    POC Glucose 09/26/2022 331 (A)  65 - 105 mg/dL Final    POC Glucose 09/27/2022 196 (A)  65 - 105 mg/dL Final    POC Glucose 09/27/2022 227 (A)  65 - 105 mg/dL Final    POC Glucose 09/27/2022 311 (A)  65 - 105 mg/dL Final    POC Glucose 09/27/2022 295 (A)  65 - 105 mg/dL Final    POC Glucose 09/27/2022 301 (A)  65 - 105 mg/dL Final    POC Glucose 09/28/2022 267 (A)  65 - 105 mg/dL Final    POC Glucose 09/28/2022 334 (A)  65 - 105 mg/dL Final    POC Glucose 09/28/2022 270 (A)  65 - 105 mg/dL Final    POC Glucose 09/28/2022 248 (A)  65 - 105 mg/dL Final    POC Glucose 09/28/2022 251 (A)  65 - 105 mg/dL Final    POC Glucose 09/28/2022 235 (A)  65 - 105 mg/dL Final    Critical Noted    POC Glucose 09/28/2022 323 (A)  65 - 105 mg/dL Final         Reviewed patient's current plan of care and vital signs with nursing staff.     Labs reviewed: [x] Yes    Medications  Current Facility-Administered Medications: [Held by provider] insulin glargine (LANTUS) injection vial 17 Units, 17 Units, SubCUTAneous, BID  atorvastatin (LIPITOR) tablet 40 mg, 40 mg, Oral, Nightly  [Held by provider] metFORMIN (GLUCOPHAGE) tablet 1,000 mg, 1,000 mg, Oral, BID WC  glucagon (rDNA) injection 1 mg, 1 mg, SubCUTAneous, PRN  glucose chewable tablet 16 g, 4 tablet, Oral, PRN  OLANZapine zydis (ZYPREXA) disintegrating tablet 15 mg, 15 mg, Oral, Nightly  pantoprazole (PROTONIX) tablet 40 mg, 40 mg, Oral, BID  pregabalin (LYRICA) capsule 75 mg, 75 mg, Oral, BID  acetaminophen (TYLENOL) tablet 650 mg, 650 mg, Oral, Q6H PRN  ibuprofen (ADVIL;MOTRIN) tablet 400 mg, 400 mg, Oral, Q6H PRN  hydrOXYzine HCl (ATARAX) tablet 50 mg, 50 mg, Oral, TID PRN  traZODone (DESYREL) tablet 50 mg, 50 mg, Oral, Nightly PRN  polyethylene glycol (GLYCOLAX) packet 17 g, 17 g, Oral, Daily PRN  aluminum & magnesium hydroxide-simethicone (MAALOX) 200-200-20 MG/5ML suspension 30 mL, 30 mL, Oral, Q6H PRN  nicotine polacrilex (NICORETTE) gum 2 mg, 2 mg, Oral, Q2H PRN  haloperidol lactate (HALDOL) injection 5 mg, 5 mg, IntraMUSCular, Q6H PRN **AND** LORazepam (ATIVAN) injection 2 mg, 2 mg, IntraMUSCular, Q6H PRN **AND** diphenhydrAMINE (BENADRYL) injection 50 mg, 50 mg, IntraMUSCular, Q6H PRN  [Held by provider] insulin lispro (HUMALOG) injection vial 0-4 Units, 0-4 Units, SubCUTAneous, TID WC  [Held by provider] insulin lispro (HUMALOG) injection vial 0-4 Units, 0-4 Units, SubCUTAneous, Nightly    ASSESSMENT  Unspecified psychosis not due to a substance or known physiological condition [facility])         HANDOFF  Patient symptoms are: unstable  Medications as determined by attending physician. Continue current medication regiment and monitor  Encourage participation in groups and milieu. Probable discharge is to be determined by MD    Electronically signed by LAILA Sanchez CNP on 9/28/2022 at 2:43 PM    **This report has been created using voice recognition software. It may contain minor errors which are inherent in voice recognition technology. **     I independently saw and evaluated the patient. I reviewed the nurse practioner's documentation above. Any additional comments or changes to the    documentation are stated below otherwise agree with assessment. The patient was seen face to face. She does recollect conversation yesterday. She is aware that she is not able to go home. She still does not seem to understand the difficulty in her placement.   She is religiously preoccupied and talking about \"God's voice\"      PLAN  Medications as noted above  Attempt to develop insight  Expected Length of Stay is indeterminate days.   Psycho-education conducted. Supportive Therapy conducted.   Follow-up daily while on inpatient unit    Electronically signed by Shameka Edwards MD on 9/28/22 at 3:40 PM EDT

## 2022-09-28 NOTE — PROGRESS NOTES
Pt not seen  Responded to nurse re blood sugar  Insulin un hold for blood sugar  gone755  Wilman Blanco MD  9/28/2022

## 2022-09-28 NOTE — PROGRESS NOTES
Patient refused blood sugar check at 0500. She is alert and oriented to self, place, time, and situation. She refused neuro checks also.

## 2022-09-28 NOTE — GROUP NOTE
Group Therapy Note    Date: 9/27/2022    Group Start Time: 2030  Group End Time: 2050  Group Topic: Wrap-Up    [facility]    Jacinda Chiang RN        Group Therapy Note    Attendees: 8/15      Status After Intervention:  Improved    Participation Level:  Active Listener    Participation Quality: Appropriate      Speech:  normal      Thought Process/Content: Flight of ideas      Affective Functioning: Blunted      Mood: anxious      Level of consciousness:  Alert      Response to Learning: Able to verbalize current knowledge/experience      Endings: None Reported    Modes of Intervention: Education      Discipline Responsible: Behavorial Health Tech      Signature:  Jacinda Chiang RN

## 2022-09-28 NOTE — PROGRESS NOTES
Pharmacy Med Education Group Note    Date: 09/28/22  Start Time: 1430  End Time: 1500    Number Participants in Group:  5/13    Goal:  Patient will demonstrate an understanding of the medications intended purpose and possible adverse effects    Topic: [place] for Pharmacy Med Ed Group    Discipline Responsible:     OT  AT  [facility].  RT     X Other       Participation Level:     None  Minimal      X Active Listener    X Interactive    Monopolizing         Participation Quality:    X Appropriate  Inappropriate     X       Attentive        Intrusive          Sharing        Resistant          Supportive        Lethargic       Affective:     X Congruent  Incongruent  Blunted  Flat    Constricted  Anxious  Elated  Angry    Labile  Depressed  Other         Cognitive:    X Alert  Oriented PPTP     Concentration   X G  F  P   Attention Span   X G  F  P   Short-Term Memory   X G  F  P   Long-Term Memory  G  F  P   ProblemSolving/  Decision Making  G  F  P   Ability to Process  Information   X G  F  P      Contributing Factors             Delusional             Hallucinating             Flight of Ideas             Other:       Modes of Intervention:    X Education   X Support  Exploration    Clarifying  Problem Solving  Confrontation    Socialization  Limit Setting  Reality Testing    Activity  Movement  Media    Other:            Response to Learning:    X Able to verbalize current knowledge/experience    Able to verbalize/acknowledge new learning    Able to retain information    Capable of insight    Able to change behavior    Progressing to goal    Other:        Comments:   Patient spoke a lot during group, very tangential, required redirection     Danny Chavez, PharmD, [place]  PGY-1 Pharmacy Resident  9/28/2022 3:14 PM

## 2022-09-29 LAB
ANION GAP SERPL CALCULATED.3IONS-SCNC: 9 MMOL/L (ref 9–17)
BUN BLDV-MCNC: 14 MG/DL (ref 6–20)
CALCIUM SERPL-MCNC: 8.4 MG/DL (ref 8.6–10.4)
CHLORIDE BLD-SCNC: 103 MMOL/L (ref 98–107)
CO2: 26 MMOL/L (ref 20–31)
CREAT SERPL-MCNC: 1.05 MG/DL (ref 0.5–0.9)
GFR AFRICAN AMERICAN: >60 ML/MIN
GFR NON-AFRICAN AMERICAN: 54 ML/MIN
GFR SERPL CREATININE-BSD FRML MDRD: ABNORMAL ML/MIN/{1.73_M2}
GLUCOSE BLD-MCNC: 153 MG/DL (ref 65–105)
GLUCOSE BLD-MCNC: 172 MG/DL (ref 65–105)
GLUCOSE BLD-MCNC: 244 MG/DL (ref 65–105)
GLUCOSE BLD-MCNC: 299 MG/DL (ref 70–99)
POTASSIUM SERPL-SCNC: 5 MMOL/L (ref 3.7–5.3)
SODIUM BLD-SCNC: 138 MMOL/L (ref 135–144)

## 2022-09-29 PROCEDURE — 99232 SBSQ HOSP IP/OBS MODERATE 35: CPT | Performed by: PSYCHIATRY & NEUROLOGY

## 2022-09-29 PROCEDURE — APPSS30 APP SPLIT SHARED TIME 16-30 MINUTES

## 2022-09-29 PROCEDURE — 6370000000 HC RX 637 (ALT 250 FOR IP): Performed by: PSYCHIATRY & NEUROLOGY

## 2022-09-29 PROCEDURE — 82947 ASSAY GLUCOSE BLOOD QUANT: CPT

## 2022-09-29 PROCEDURE — 36415 COLL VENOUS BLD VENIPUNCTURE: CPT

## 2022-09-29 PROCEDURE — 6370000000 HC RX 637 (ALT 250 FOR IP): Performed by: INTERNAL MEDICINE

## 2022-09-29 PROCEDURE — 6370000000 HC RX 637 (ALT 250 FOR IP): Performed by: NURSE PRACTITIONER

## 2022-09-29 PROCEDURE — 1240000000 HC EMOTIONAL WELLNESS R&B

## 2022-09-29 PROCEDURE — 80048 BASIC METABOLIC PNL TOTAL CA: CPT

## 2022-09-29 RX ORDER — LIDOCAINE 4 G/G
1 PATCH TOPICAL DAILY
Status: DISCONTINUED | OUTPATIENT
Start: 2022-09-29 | End: 2022-10-13 | Stop reason: HOSPADM

## 2022-09-29 RX ADMIN — METFORMIN HYDROCHLORIDE 1000 MG: 1000 TABLET ORAL at 08:30

## 2022-09-29 RX ADMIN — INSULIN GLARGINE 17 UNITS: 100 INJECTION, SOLUTION SUBCUTANEOUS at 20:41

## 2022-09-29 RX ADMIN — PREGABALIN 75 MG: 75 CAPSULE ORAL at 20:43

## 2022-09-29 RX ADMIN — OLANZAPINE 15 MG: 10 TABLET, ORALLY DISINTEGRATING ORAL at 20:42

## 2022-09-29 RX ADMIN — ATORVASTATIN CALCIUM 40 MG: 20 TABLET, FILM COATED ORAL at 20:44

## 2022-09-29 RX ADMIN — INSULIN LISPRO 1 UNITS: 100 INJECTION, SOLUTION INTRAVENOUS; SUBCUTANEOUS at 12:07

## 2022-09-29 RX ADMIN — ACETAMINOPHEN 650 MG: 325 TABLET, FILM COATED ORAL at 19:51

## 2022-09-29 RX ADMIN — METFORMIN HYDROCHLORIDE 1000 MG: 1000 TABLET ORAL at 16:45

## 2022-09-29 RX ADMIN — IBUPROFEN 400 MG: 400 TABLET ORAL at 20:44

## 2022-09-29 RX ADMIN — INSULIN LISPRO 2 UNITS: 100 INJECTION, SOLUTION INTRAVENOUS; SUBCUTANEOUS at 16:45

## 2022-09-29 RX ADMIN — INSULIN GLARGINE 17 UNITS: 100 INJECTION, SOLUTION SUBCUTANEOUS at 08:30

## 2022-09-29 RX ADMIN — PANTOPRAZOLE SODIUM 40 MG: 40 TABLET, DELAYED RELEASE ORAL at 08:30

## 2022-09-29 RX ADMIN — IBUPROFEN 400 MG: 400 TABLET ORAL at 14:40

## 2022-09-29 RX ADMIN — PANTOPRAZOLE SODIUM 40 MG: 40 TABLET, DELAYED RELEASE ORAL at 20:43

## 2022-09-29 RX ADMIN — HYDROXYZINE HYDROCHLORIDE 50 MG: 50 TABLET ORAL at 22:50

## 2022-09-29 RX ADMIN — PREGABALIN 75 MG: 75 CAPSULE ORAL at 08:30

## 2022-09-29 RX ADMIN — TRAZODONE HYDROCHLORIDE 50 MG: 50 TABLET ORAL at 20:43

## 2022-09-29 RX ADMIN — HYDROXYZINE HYDROCHLORIDE 50 MG: 50 TABLET ORAL at 16:45

## 2022-09-29 RX ADMIN — ACETAMINOPHEN 650 MG: 325 TABLET, FILM COATED ORAL at 14:14

## 2022-09-29 ASSESSMENT — PAIN DESCRIPTION - ORIENTATION
ORIENTATION: RIGHT

## 2022-09-29 ASSESSMENT — PAIN SCALES - GENERAL
PAINLEVEL_OUTOF10: 3
PAINLEVEL_OUTOF10: 7
PAINLEVEL_OUTOF10: 3
PAINLEVEL_OUTOF10: 9
PAINLEVEL_OUTOF10: 10
PAINLEVEL_OUTOF10: 9

## 2022-09-29 ASSESSMENT — PAIN DESCRIPTION - DESCRIPTORS
DESCRIPTORS: ACHING
DESCRIPTORS: ACHING

## 2022-09-29 ASSESSMENT — PAIN DESCRIPTION - LOCATION
LOCATION: LEG
LOCATION: HIP
LOCATION: LEG
LOCATION: HIP

## 2022-09-29 NOTE — PROGRESS NOTES
Internal Medicine contacted regarding wrong dose of lantus given. New orders given for hourly blood sugar checks and neuro checks x 11 then every 4 hours for 7 occurrences.

## 2022-09-29 NOTE — PLAN OF CARE
Problem: Anxiety  Goal: Will report anxiety at manageable levels  Description: INTERVENTIONS:  1. Administer medication as ordered  2. Teach and rehearse alternative coping skills  3. Provide emotional support with 1:1 interaction with staff  Outcome: Progressing     Problem: Confusion  Goal: Confusion, delirium, dementia, or psychosis is improved or at baseline  Description: INTERVENTIONS:  1. Assess for possible contributors to thought disturbance, including medications, impaired vision or hearing, underlying metabolic abnormalities, dehydration, psychiatric diagnoses, and notify attending LIP  2. [place] high risk fall precautions, as indicated  3. Provide frequent short contacts to provide reality reorientation, refocusing and direction  4. Decrease environmental stimuli, including noise as appropriate  5. Monitor and intervene to maintain adequate nutrition, hydration, elimination, sleep and activity  6. If unable to ensure safety without constant attention obtain sitter and review sitter guidelines with assigned personnel  7.  Initiate Psychosocial CNS and Spiritual Care consult, as indicated  Outcome: Progressing     Problem: Safety - Adult  Goal: Free from fall injury  Outcome: Progressing

## 2022-09-29 NOTE — GROUP NOTE
Group Therapy Note    Date: 9/28/2022    Group Start Time: 2030  Group End Time: 2050  Group Topic: Wrap-Up    [facility] Cummins        Group Therapy Note    Attendees: 6/12     Patient's Goal: Patient will verbalize today's goals as well as for post discharge. Patient will also offer supportive listening and interact with peers to clarify and understand clearly set goals. Notes: Patient is making progress AEB participating in group discussion, actively listening, and supporting other group members. Status After Intervention: Improved      Participation Level:  Active Listener and Interactive      Participation Quality: Appropriate, Attentive, Sharing, and Supportive      Speech: normal      Thought Process/Content: Logical, Linear      Affective Functioning: Congruent      Mood: anxious      Level of consciousness: Oriented x4      Response to Learning: Able to verbalize current knowledge/experience, Able to verbalize/acknowledge new learning,      Able to retain information, and Capable of insight      Endings: None Reported      Modes of Intervention: Education, Support, Socialization, and Problem-solving        Discipline Responsible: Behavorial Health Tech      Signature: Daljit Cummins

## 2022-09-29 NOTE — PLAN OF CARE
Problem: Chronic Conditions and Co-morbidities  Goal: Patient's chronic conditions and co-morbidity symptoms are monitored and maintained or improved  9/28/2022 2134 by Cammie Sandhu RN  Outcome: Progressing  9/28/2022 1302 by Amber Panchal LPN  Reactivated  Patient blood sugar at bedtime was 423. Provider contacted. Additional 4 units humalog ordered. Problem: Anxiety  Goal: Will report anxiety at manageable levels  Description: INTERVENTIONS:  1. Administer medication as ordered  2. Teach and rehearse alternative coping skills  3. Provide emotional support with 1:1 interaction with staff  9/28/2022 2134 by Cammie Sandhu RN  Outcome: Progressing  9/28/2022 1302 by Amber Panchal LPN  Reactivated  Patient reports increased anxiety related to losing her housing, She reports she has options of where to stay after discharge. Problem: Confusion  Goal: Confusion, delirium, dementia, or psychosis is improved or at baseline  Description: INTERVENTIONS:  1. Assess for possible contributors to thought disturbance, including medications, impaired vision or hearing, underlying metabolic abnormalities, dehydration, psychiatric diagnoses, and notify attending LIP  2. [place] high risk fall precautions, as indicated  3. Provide frequent short contacts to provide reality reorientation, refocusing and direction  4. Decrease environmental stimuli, including noise as appropriate  5. Monitor and intervene to maintain adequate nutrition, hydration, elimination, sleep and activity  6. If unable to ensure safety without constant attention obtain sitter and review sitter guidelines with assigned personnel  7. Initiate Psychosocial CNS and Spiritual Care consult, as indicated  9/28/2022 2134 by Cammie Sandhu RN  Outcome: Progressing  9/28/2022 1302 by Amber Panchal LPN  Reactivated  Patient is alert and oriented to self, place, time, and situation. She is sometimes confused by the names of medications.  Safety plan reviewed with patient, agrees to approach staff when feeling upset. 15 minute and random checks maintained for safety. No violent or escalating behaviors noted during this shift. Patient is currently calm, controlled and medication-compliant. She is pleasant and cooperative with select staff. She is irritable at times. Problem: Safety - Adult  Goal: Free from fall injury  9/28/2022 2134 by Ramiro Hathaway RN  Outcome: Progressing  9/28/2022 1305 by Marquez Tolentino LPN  Outcome: Progressing  Note: Patient has remained free from falls. Will continue to monitor. 9/28/2022 1302 by Zak Bautista LPN  Reactivated  Patient remains free of falls and verbalizes understanding of individual fall risks. Patient is wearing non-skid footwear and encouraged to seek out staff for any assistance needed. Problem: Pain  Goal: Verbalizes/displays adequate comfort level or baseline comfort level  9/28/2022 2134 by Ramiro Hathaway RN  Outcome: Progressing  9/28/2022 1302 by Zak Bautista LPN  Reactivated  Patient reports chronic pain that is poorly controlled by available medications. Problem: Self Harm/Suicidality  Goal: Will have no self-injury during hospital stay  Description: INTERVENTIONS:  1. Q 30 MINUTES: Routine safety checks  2. Q SHIFT & PRN: Assess risk to determine if routine checks are adequate to maintain patient safety  9/28/2022 2134 by Ramiro Hathaway RN  Outcome: Progressing       Patient denies suicidal ideation, homicidal ideation and hallucinations at this time. Patient has made no attempt to harm self at this time.

## 2022-09-29 NOTE — PROGRESS NOTES
Daily Progress Note  9/29/2022    Patient Name: Kati Savage: Acute psychosis         SUBJECTIVE:      Patient is seen today for a follow up assessment. Patient agreeable to assessment to day room. Patient reports \"I'm mad, angry and sad\" about loosing her apartment. She is loud and tearful at times during assessment as she reports feeling thoughts of self harm, unwilling to share plan but does state \"I just want to die, Im tired of loosing everything\". He is anxious and irritable and is focused on the loss of her apartment and belongings. She states, \"my anxiety is blooming\", and depression is ok. Writer encouraged for patient to utilize available medication, Atarax 50mg, last used 9/28/22 at 2059. Patient engages to select group and continues to be compliant with scheduled medication. However, she continues to refuse physical therapy. When discussed importance of therapy  she irritably states, \"I lost all of my stuff, the last thing in my  mind is therapy\". Patient struggled to understand the difficulty in placement post discharge and encouraged to work with social work to help resolve such issue. At this time the patient is not appropriate for a lower level of care. There is risk of decompensation and worsening.   Patient further hospitalization for safety and hospitalization     Appetite:  [x] Adequate/Unchanged  [] Increased  [] Decreased      Sleep:       [] Adequate/Unchanged  [x] Fair  [] Poor      Group Attendance on Unit:   [] Yes   [] Selectively    [x] No    Compliant with scheduled medications: [x] Yes  [] No    Received emergency medications in past 24 hrs: [] Yes   [x] No    Medication Side Effects: Denies          Mental Status Exam  Level of consciousness: Alert and awake   Appearance: Appropriate attire for setting, seated in wheelchair, with fair  grooming and hygiene   Behavior/Motor: Approachable, engages with interviewer  Attitude toward examiner: Cooperative, attentive, poor eye contact  Speech: spontaneous, rapid, and loud and irritable tone  Mood: Patient reports \"I'm mad, angry and sad\"   Affect: Irritable and easily frustrated, tearful  Thought processes: linear and goal directed   Thought content:  Denies homicidal ideation  Suicidal Ideation: Endorses suicidal ideations, unable to contract for safety off the unit  Delusions: No evidence of delusions. Perceptual Disturbance: Patient does not appear to be responding to internal stimuli. Cognition: Oriented to self, location, and disorganized to situation   Memory: impaired recent memory  Insight: poor   Judgement: poor       Data   height is 5' 5\" (1.651 m) and weight is 207 lb (93.9 kg). Her oral temperature is 97.8 °F (36.6 °C). Her blood pressure is 101/63 and her pulse is 100. Her respiration is 14.    Labs:   Admission on 09/22/2022   Component Date Value Ref Range Status    POC Glucose 09/22/2022 135 (A)  65 - 105 mg/dL Final    POC Glucose 09/23/2022 168 (A)  65 - 105 mg/dL Final    WBC 09/23/2022 5.3  3.5 - 11.0 k/uL Final    RBC 09/23/2022 2.72 (A)  4.0 - 5.2 m/uL Final    Hemoglobin 09/23/2022 8.6 (A)  12.0 - 16.0 g/dL Final    Hematocrit 09/23/2022 26.8 (A)  36 - 46 % Final    MCV 09/23/2022 98.8  80 - 100 fL Final    MCH 09/23/2022 31.8  26 - 34 pg Final    MCHC 09/23/2022 32.2  31 - 37 g/dL Final    RDW 09/23/2022 14.9  11.5 - 14.9 % Final    Platelets 11/63/1763 434  150 - 450 k/uL Final    MPV 09/23/2022 7.8  6.0 - 12.0 fL Final    Glucose 09/23/2022 315 (A)  70 - 99 mg/dL Final    BUN 09/23/2022 13  6 - 20 mg/dL Final    Creatinine 09/23/2022 0.78  0.50 - 0.90 mg/dL Final    Calcium 09/23/2022 8.7  8.6 - 10.4 mg/dL Final    Sodium 09/23/2022 135  135 - 144 mmol/L Final    Potassium 09/23/2022 4.8  3.7 - 5.3 mmol/L Final    Chloride 09/23/2022 101  98 - 107 mmol/L Final    CO2 09/23/2022 24  20 - 31 mmol/L Final    Anion Gap 09/23/2022 10  9 - 17 mmol/L Final    Alkaline Phosphatase 09/23/2022 243 (A)  35 - 104 U/L Final    ALT 09/23/2022 10  5 - 33 U/L Final    AST 09/23/2022 8  <32 U/L Final    Total Bilirubin 09/23/2022 0.2 (A)  0.3 - 1.2 mg/dL Final    Total Protein 09/23/2022 6.8  6.4 - 8.3 g/dL Final    Albumin 09/23/2022 2.7 (A)  3.5 - 5.2 g/dL Final    GFR Non- 09/23/2022 >60  >60 mL/min Final    GFR  09/23/2022 >60  >60 mL/min Final    GFR Comment 09/23/2022        Final    Comment: Average GFR for 52-63 years old:   80 mL/min/1.73sq m  Chronic Kidney Disease:   <60 mL/min/1.73sq m  Kidney failure:   <15 mL/min/1.73sq m              eGFR calculated using average adult body mass. Additional eGFR calculator available at:        [employer]            POC Glucose 09/23/2022 222 (A)  65 - 105 mg/dL Final    POC Glucose 09/23/2022 286 (A)  65 - 105 mg/dL Final    POC Glucose 09/23/2022 237 (A)  65 - 105 mg/dL Final    POC Glucose 09/24/2022 185 (A)  65 - 105 mg/dL Final    POC Glucose 09/24/2022 177 (A)  65 - 105 mg/dL Final    POC Glucose 09/24/2022 241 (A)  65 - 105 mg/dL Final    POC Glucose 09/24/2022 221 (A)  65 - 105 mg/dL Final    POC Glucose 09/24/2022 248 (A)  65 - 105 mg/dL Final    Hepatitis C Ab 09/26/2022 NONREACTIVE  NONREACTIVE Final    Comment:       The hepatitis C procedure used in our laboratory is a Chemiluminescent test specific for   three recombinant HCV antigens. A negative anti-HCV result indicates that the antibodies to   hepatitis C virus are not present at this time. Individuals with reactive anti-HCV should be considered infected and infectious until proven   otherwise. Confirmation of all equivocal or reactive results is recommended by ordering   HCV RNA by PCR.       POC Glucose 09/25/2022 142 (A)  65 - 105 mg/dL Final    POC Glucose 09/25/2022 204 (A)  65 - 105 mg/dL Final    POC Glucose 09/25/2022 255 (A)  65 - 105 mg/dL Final    POC Glucose 09/25/2022 224 (A)  65 - 105 mg/dL Final    HIV Ag/Ab 09/26/2022 NONREACTIVE  NONREACTIVE Final    Comment: No laboratory evidence of HIV infection. If acute HIV infection is suspected, consider   testing for HIV-1 RNA.       POC Glucose 09/26/2022 140 (A)  65 - 105 mg/dL Final    POC Glucose 09/26/2022 248 (A)  65 - 105 mg/dL Final    POC Glucose 09/26/2022 279 (A)  65 - 105 mg/dL Final    POC Glucose 09/26/2022 331 (A)  65 - 105 mg/dL Final    POC Glucose 09/27/2022 196 (A)  65 - 105 mg/dL Final    POC Glucose 09/27/2022 227 (A)  65 - 105 mg/dL Final    POC Glucose 09/27/2022 311 (A)  65 - 105 mg/dL Final    POC Glucose 09/27/2022 295 (A)  65 - 105 mg/dL Final    POC Glucose 09/27/2022 301 (A)  65 - 105 mg/dL Final    POC Glucose 09/28/2022 267 (A)  65 - 105 mg/dL Final    POC Glucose 09/28/2022 334 (A)  65 - 105 mg/dL Final    POC Glucose 09/28/2022 270 (A)  65 - 105 mg/dL Final    POC Glucose 09/28/2022 248 (A)  65 - 105 mg/dL Final    POC Glucose 09/28/2022 251 (A)  65 - 105 mg/dL Final    POC Glucose 09/28/2022 235 (A)  65 - 105 mg/dL Final    Critical Noted    POC Glucose 09/28/2022 323 (A)  65 - 105 mg/dL Final    POC Glucose 09/28/2022 475 (A)  65 - 105 mg/dL Final    Critical Noted    POC Glucose 09/28/2022 452 (A)  65 - 105 mg/dL Final    Critical Noted    POC Glucose 09/28/2022 423 (A)  65 - 105 mg/dL Final    Critical Noted    Glucose 09/29/2022 299 (A)  70 - 99 mg/dL Final    BUN 09/29/2022 14  6 - 20 mg/dL Final    Creatinine 09/29/2022 1.05 (A)  0.50 - 0.90 mg/dL Final    Calcium 09/29/2022 8.4 (A)  8.6 - 10.4 mg/dL Final    Sodium 09/29/2022 138  135 - 144 mmol/L Final    Potassium 09/29/2022 5.0  3.7 - 5.3 mmol/L Final    Chloride 09/29/2022 103  98 - 107 mmol/L Final    CO2 09/29/2022 26  20 - 31 mmol/L Final    Anion Gap 09/29/2022 9  9 - 17 mmol/L Final    GFR Non- 09/29/2022 54 (A)  >60 mL/min Final    GFR  09/29/2022 >60  >60 mL/min Final    GFR Comment 09/29/2022        Final    Comment: Average GFR for 50-59 years old:   80 mL/min/1.73sq m  Chronic Kidney Disease:   <60 mL/min/1.73sq m  Kidney failure:   <15 mL/min/1.73sq m              eGFR calculated using average adult body mass. Additional eGFR calculator available at:        [employer]            POC Glucose 09/29/2022 153 (A)  65 - 105 mg/dL Final    POC Glucose 09/29/2022 244 (A)  65 - 105 mg/dL Final         Reviewed patient's current plan of care and vital signs with nursing staff.     Labs reviewed: [x] Yes  Creatinine 1.05, glucose random 299, calcium serum 8.4  Medications  Current Facility-Administered Medications: insulin lispro (HUMALOG) injection vial 0-4 Units, 0-4 Units, SubCUTAneous, TID WC  insulin lispro (HUMALOG) injection vial 0-4 Units, 0-4 Units, SubCUTAneous, Nightly  metFORMIN (GLUCOPHAGE) tablet 1,000 mg, 1,000 mg, Oral, BID WC  insulin glargine (LANTUS) injection vial 17 Units, 17 Units, SubCUTAneous, BID  atorvastatin (LIPITOR) tablet 40 mg, 40 mg, Oral, Nightly  glucagon (rDNA) injection 1 mg, 1 mg, SubCUTAneous, PRN  glucose chewable tablet 16 g, 4 tablet, Oral, PRN  OLANZapine zydis (ZYPREXA) disintegrating tablet 15 mg, 15 mg, Oral, Nightly  pantoprazole (PROTONIX) tablet 40 mg, 40 mg, Oral, BID  pregabalin (LYRICA) capsule 75 mg, 75 mg, Oral, BID  acetaminophen (TYLENOL) tablet 650 mg, 650 mg, Oral, Q6H PRN  ibuprofen (ADVIL;MOTRIN) tablet 400 mg, 400 mg, Oral, Q6H PRN  hydrOXYzine HCl (ATARAX) tablet 50 mg, 50 mg, Oral, TID PRN  traZODone (DESYREL) tablet 50 mg, 50 mg, Oral, Nightly PRN  polyethylene glycol (GLYCOLAX) packet 17 g, 17 g, Oral, Daily PRN  aluminum & magnesium hydroxide-simethicone (MAALOX) 200-200-20 MG/5ML suspension 30 mL, 30 mL, Oral, Q6H PRN  nicotine polacrilex (NICORETTE) gum 2 mg, 2 mg, Oral, Q2H PRN  haloperidol lactate (HALDOL) injection 5 mg, 5 mg, IntraMUSCular, Q6H PRN **AND** LORazepam (ATIVAN) injection 2 mg, 2 mg, IntraMUSCular, Q6H PRN **AND** diphenhydrAMINE (BENADRYL) injection 50 mg, 50 mg, IntraMUSCular, Q6H PRN    ASSESSMENT  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 Continue current medication regiment and monitor  Encourage participation in groups and milieu. Probable discharge is to be determined by MD    Electronically signed by LAILA Dempsey CNP on 9/29/2022 at 3:44 PM    PLAN  Medications as noted above  Attempt to develop insight  Expected Length of Stay is indeterminate days. Psycho-education conducted. Supportive Therapy conducted. Follow-up daily while on inpatient unit    Electronically signed by Brian Cook MD on 9/28/22 at 3:40 PM EDT  I independently saw and evaluated the patient. I reviewed the  documentation above. Any additional comments or changes to the   documentation are stated below otherwise agree with assessment. The patient continues to be disoriented and has memory difficulties. She does recall our previous conversation. She is discharged focused and believes that she can go to her sister's place. We discussed that we will have to verify this before discharging her. PLAN  Medications as noted above  Attempt to develop insight  Psycho-education conducted. Estimated Length of Stay is indeterminate days  Supportive Therapy conducted.   Follow-up daily while on inpatient unit    Electronically signed by Brian Cook MD on 9/29/22 at 10:41 PM EDT

## 2022-09-29 NOTE — GROUP NOTE
Group Therapy Note    Date: 9/29/2022    Group Start Time: 1000  Group End Time: 6377  Group Topic: Psychotherapy    STEPHANY Logan LSW        Group Therapy Note    Attendees: 5/12       Patient's Goal:  Increase interpersonal relationship skills    Notes:  Patient was an active participant in group activity    Status After Intervention:  Unchanged    Participation Level:  Active Listener and Interactive    Participation Quality: Appropriate, Attentive, and Sharing      Speech:  normal      Thought Process/Content: Flight of ideas      Affective Functioning: Congruent      Mood: depressed      Level of consciousness:  Alert, Oriented x4, and Attentive      Response to Learning: Able to verbalize current knowledge/experience, Able to verbalize/acknowledge new learning, and Able to retain information      Endings: None Reported    Modes of Intervention: Support, Socialization, and Exploration      Discipline Responsible: /Counselor      Signature:  STEPHANY Zazueta, PATRICIA

## 2022-09-29 NOTE — GROUP NOTE
Group Therapy Note    Date: 9/29/2022    Group Start Time: 1100  Group End Time: 1130  Group Topic: Psychoeducation    [facility]    CHRISTINA Meehan        Group Therapy Note    Attendees: 4/12       Patient's Goal:  to improve communication skills /effective communication    Notes:  pt was pleasant     Status After Intervention:  Improved    Participation Level:  Active Listener and Interactive    Participation Quality: Appropriate and Sharing      Speech: normal      Thought Process/Content: logical      Affective Functioning: congruent,      Mood: euthymic      Level of consciousness:  Alert      Response to Learning: Able to verbalize current knowledge/experience and Progressing to goal      Endings: None Reported    Modes of Intervention: Education, Support, Socialization, and Activity      Discipline Responsible: Psychoeducational Specialist      Signature:  Janey Fitzpatrick

## 2022-09-29 NOTE — GROUP NOTE
Group Therapy Note    Date: 9/29/2022    Group Start Time: 1330  Group End Time: 8376  Group Topic: Recreation group    [facility]    CHRISTINA Ramirez        Group Therapy Note    Attendees: 6/12       Patient's Goal:  to improve social skills /improve leisure interests    Notes:  pt was pleasant and participated well     Status After Intervention:  Improved    Participation Level:  Active Listener and Interactive    Participation Quality: Appropriate and Sharing      Speech: normal      Thought Process/Content: logical      Affective Functioning: congruent,      Mood: euthymic      Level of consciousness:  Alert      Response to Learning: Able to verbalize current knowledge/experience and Progressing to goal      Endings: None Reported    Modes of Intervention: Education, Support, Socialization, and Activity      Discipline Responsible: Psychoeducational Specialist      Signature:  Chrissie Gray

## 2022-09-29 NOTE — GROUP NOTE
Group Therapy Note    Date: 9/29/2022    Group Start Time: 0900  Group End Time: 0915  Group Topic: Community Meeting    Micki More SHANNEN THOMPSON    [id number] [address] Meeting Group Note        Date: September 29, 2022 Start Time: 9am  End Time: 10am      Number of Participants in Group & Unit Census:  8/12    Topic: Goals Group    Goal of Group: To establish a goal for the day      Comments:     Patient did not participate in [employer] group, despite staff encouragement and explanation of benefits. Patient remain seclusive to self. Q15 minute safety checks maintained for patient safety and will continue to encourage patient to attend unit programming.        Group Therapy Note    Attendees: 8/12

## 2022-09-29 NOTE — PROGRESS NOTES
Physical Therapy        Physical Therapy Cancel Note      DATE: 2022    NAME: Tara Hansen  MRN: [id number]   : 1965      Patient not seen this date for Physical Therapy due to: Other: Called the unit, Chantell BROCK answered the phone, says pt is OK to be seen by therapist, just had pain meds. Therapist  willing to wait another 20 to 30 minutes for pain pills to start working. Tisha Anderson asks pt  regarding participating in physical therapy around 3)o'clock, pt refusing to participate.       Electronically signed by Michael Pham PT on 2022 at 2:43 PM

## 2022-09-29 NOTE — GROUP NOTE
Safety Check Note     Date: September 28, 2022 Start Time: 8:30pm End Time: 8:55pm    Number of Participants & Unit Census: 12/12   Patient's Goal: Increased understanding/knowledge of personal safety and safety on the unit. Notes: Patient listened to safety discussion and cooperative with room/contraband check. Comments:    Patient participated in Safety/room check. Q15 minute safety checks maintained for patient safety and will continue to encourage safety on the unit.

## 2022-09-30 LAB
GLUCOSE BLD-MCNC: 162 MG/DL (ref 65–105)
GLUCOSE BLD-MCNC: 199 MG/DL (ref 65–105)
GLUCOSE BLD-MCNC: 239 MG/DL (ref 65–105)
GLUCOSE BLD-MCNC: 250 MG/DL (ref 65–105)
GLUCOSE BLD-MCNC: 262 MG/DL (ref 65–105)

## 2022-09-30 PROCEDURE — 6370000000 HC RX 637 (ALT 250 FOR IP): Performed by: NURSE PRACTITIONER

## 2022-09-30 PROCEDURE — 82947 ASSAY GLUCOSE BLOOD QUANT: CPT

## 2022-09-30 PROCEDURE — 1240000000 HC EMOTIONAL WELLNESS R&B

## 2022-09-30 PROCEDURE — APPSS30 APP SPLIT SHARED TIME 16-30 MINUTES

## 2022-09-30 PROCEDURE — 6370000000 HC RX 637 (ALT 250 FOR IP): Performed by: PSYCHIATRY & NEUROLOGY

## 2022-09-30 PROCEDURE — 99232 SBSQ HOSP IP/OBS MODERATE 35: CPT | Performed by: PSYCHIATRY & NEUROLOGY

## 2022-09-30 PROCEDURE — 6370000000 HC RX 637 (ALT 250 FOR IP): Performed by: INTERNAL MEDICINE

## 2022-09-30 RX ORDER — RIVASTIGMINE 4.6 MG/24H
1 PATCH, EXTENDED RELEASE TRANSDERMAL DAILY
Status: DISCONTINUED | OUTPATIENT
Start: 2022-09-30 | End: 2022-10-13 | Stop reason: HOSPADM

## 2022-09-30 RX ADMIN — PANTOPRAZOLE SODIUM 40 MG: 40 TABLET, DELAYED RELEASE ORAL at 09:27

## 2022-09-30 RX ADMIN — ATORVASTATIN CALCIUM 40 MG: 20 TABLET, FILM COATED ORAL at 20:41

## 2022-09-30 RX ADMIN — PANTOPRAZOLE SODIUM 40 MG: 40 TABLET, DELAYED RELEASE ORAL at 20:41

## 2022-09-30 RX ADMIN — HYDROXYZINE HYDROCHLORIDE 50 MG: 50 TABLET ORAL at 20:38

## 2022-09-30 RX ADMIN — ACETAMINOPHEN 650 MG: 325 TABLET, FILM COATED ORAL at 15:14

## 2022-09-30 RX ADMIN — METFORMIN HYDROCHLORIDE 1000 MG: 1000 TABLET ORAL at 17:41

## 2022-09-30 RX ADMIN — TRAZODONE HYDROCHLORIDE 50 MG: 50 TABLET ORAL at 20:38

## 2022-09-30 RX ADMIN — PREGABALIN 75 MG: 75 CAPSULE ORAL at 20:38

## 2022-09-30 RX ADMIN — INSULIN GLARGINE 17 UNITS: 100 INJECTION, SOLUTION SUBCUTANEOUS at 09:25

## 2022-09-30 RX ADMIN — METFORMIN HYDROCHLORIDE 1000 MG: 1000 TABLET ORAL at 09:26

## 2022-09-30 RX ADMIN — INSULIN LISPRO 2 UNITS: 100 INJECTION, SOLUTION INTRAVENOUS; SUBCUTANEOUS at 13:10

## 2022-09-30 RX ADMIN — OLANZAPINE 15 MG: 10 TABLET, ORALLY DISINTEGRATING ORAL at 20:39

## 2022-09-30 RX ADMIN — INSULIN GLARGINE 17 UNITS: 100 INJECTION, SOLUTION SUBCUTANEOUS at 20:41

## 2022-09-30 RX ADMIN — PREGABALIN 75 MG: 75 CAPSULE ORAL at 09:27

## 2022-09-30 RX ADMIN — IBUPROFEN 400 MG: 400 TABLET ORAL at 18:05

## 2022-09-30 ASSESSMENT — PAIN DESCRIPTION - LOCATION
LOCATION: LEG
LOCATION: LEG

## 2022-09-30 ASSESSMENT — PAIN SCALES - GENERAL
PAINLEVEL_OUTOF10: 1
PAINLEVEL_OUTOF10: 3
PAINLEVEL_OUTOF10: 6

## 2022-09-30 ASSESSMENT — PAIN DESCRIPTION - ORIENTATION
ORIENTATION: RIGHT;LEFT
ORIENTATION: LEFT;RIGHT

## 2022-09-30 ASSESSMENT — PAIN DESCRIPTION - DESCRIPTORS
DESCRIPTORS: ACHING;SORE
DESCRIPTORS: ACHING

## 2022-09-30 NOTE — PLAN OF CARE
Problem: Chronic Conditions and Co-morbidities  Goal: Patient's chronic conditions and co-morbidity symptoms are monitored and maintained or improved  9/30/2022 1322 by Sriram Hernandez RN  Outcome: Progressing     Problem: Anxiety  Goal: Will report anxiety at manageable levels  Description: INTERVENTIONS:  1. Administer medication as ordered  2. Teach and rehearse alternative coping skills  3. Provide emotional support with 1:1 interaction with staff  9/30/2022 1647 by Shahla Daly LPN  Outcome: Progressing  9/30/2022 1322 by Sriram Hernandez RN  Outcome: Progressing     Problem: Confusion  Goal: Confusion, delirium, dementia, or psychosis is improved or at baseline  Description: INTERVENTIONS:  1. Assess for possible contributors to thought disturbance, including medications, impaired vision or hearing, underlying metabolic abnormalities, dehydration, psychiatric diagnoses, and notify attending LIP  2. [place] high risk fall precautions, as indicated  3. Provide frequent short contacts to provide reality reorientation, refocusing and direction  4. Decrease environmental stimuli, including noise as appropriate  5. Monitor and intervene to maintain adequate nutrition, hydration, elimination, sleep and activity  6. If unable to ensure safety without constant attention obtain sitter and review sitter guidelines with assigned personnel  7. Initiate Psychosocial CNS and Spiritual Care consult, as indicated  9/30/2022 1647 by Shahla Daly LPN  Outcome: Progressing  9/30/2022 1322 by Sriram Hernandez RN  Outcome: Progressing     Problem: Safety - Adult  Goal: Free from fall injury  9/30/2022 1647 by Shahla Daly LPN  Outcome: Progressing  Flowsheets (Taken 9/30/2022 1647)  Free From Fall Injury: Instruct family/caregiver on patient safety  Note: Patient remains free of falls and verbalizes understanding of individual fall risks. Patient uses wheelchair in day room.    Patient wearing non skid footwear and encouraged to seek out staff for any assistance needed. 9/30/2022 1322 by Valeri Carrera RN  Outcome: Progressing     Problem: Self Harm/Suicidality  Goal: Will have no self-injury during hospital stay  Description: INTERVENTIONS:  1. Q 30 MINUTES: Routine safety checks  2. Q SHIFT & PRN: Assess risk to determine if routine checks are adequate to maintain patient safety  9/30/2022 1647 by Mike Messer LPN  Outcome: Progressing  Note: Patient denies thoughts of suicide or self-harm and agreed to seek out staff should negative thoughts arise. Safe environment maintained. Q15 minute checks for safety continued per unit policy. Will continue to monitor for safety and provide support and reassurance as needed.      9/30/2022 1322 by Valeri Carrera RN  Outcome: Progressing

## 2022-09-30 NOTE — PLAN OF CARE
Problem: Self Harm/Suicidality  Goal: Will have no self-injury during hospital stay  Description: INTERVENTIONS:  1. Q 30 MINUTES: Routine safety checks  2. Q SHIFT & PRN: Assess risk to determine if routine checks are adequate to maintain patient safety  Outcome: Progressing  Note: Pt cooperative but irritable during shift assessment. Pt reports high level of depression and anxiety rates both at 10/1-10 at this time. Pt is focused on pain, states her depression and anxiety are pain related, and that she is having sleep issues because of this. However, pt currently denies any suicidal or homicidal ideations. Pt agreed to seek staff and report any intrusive thoughts of hurting self or others. Regular rounding 4 times an hour and spontaneous patient checks done for safety and per unit policy. Pt remains free from any self-harm, safe environment maintained. Will continue to provide emotional support and reassurance as needed. Problem: Confusion  Goal: Confusion, delirium, dementia, or psychosis is improved or at baseline  Description: INTERVENTIONS:  1. Assess for possible contributors to thought disturbance, including medications, impaired vision or hearing, underlying metabolic abnormalities, dehydration, psychiatric diagnoses, and notify attending LIP  2. [place] high risk fall precautions, as indicated  3. Provide frequent short contacts to provide reality reorientation, refocusing and direction  4. Decrease environmental stimuli, including noise as appropriate  5. Monitor and intervene to maintain adequate nutrition, hydration, elimination, sleep and activity  6. If unable to ensure safety without constant attention obtain sitter and review sitter guidelines with assigned personnel  7.  Initiate Psychosocial CNS and Spiritual Care consult, as indicated  9/29/2022 2308 by Malcolm Frias RN  Outcome: Progressing  Flowsheets (Taken 9/29/2022 2302)  Effect of thought disturbance (confusion, delirium, dementia, or psychosis) are managed with adequate functional status:   Assess for contributors to thought disturbance, including medications, impaired vision or hearing, underlying metabolic abnormalities, dehydration, psychiatric diagnoses, notify LIP   Provide frequent short contacts to provide reality reorientation, refocusing and direction   Decrease environmental stimuli, including noise as appropriate   Monitor and intervene to maintain adequate nutrition, hydration, elimination, sleep and activity  Note: Pt denies any hallucinations at this time. Pt is preoccupied about her right leg pain, states she could not sleep due to this pain. On-call Internal Med provider contacted, awaiting response. Problem: Anxiety  Goal: Will report anxiety at manageable levels  Description: INTERVENTIONS:  1. Administer medication as ordered  2. Teach and rehearse alternative coping skills  3. Provide emotional support with 1:1 interaction with staff  9/29/2022 2308 by Herman Olivera RN  Outcome: Progressing  Flowsheets (Taken 9/29/2022 2237)  Will report anxiety at manageable levels:   Administer medication as ordered   Provide emotional support with 1:1 interaction with staff   Teach and rehearse alternative coping skills  Note: Patient reports anxiety rates at 10/1-10 at this time,  feeling restless and having trouble sleeping and calming down in order to rest this evening. PRN medication was given as ordered for increased anxiety. Problem: Safety - Adult  Goal: Free from fall injury  9/29/2022 2308 by Herman Olivera RN  Outcome: Progressing  Flowsheets (Taken 9/29/2022 2302)  Free From Fall Injury:   Jerman Katz family/caregiver on patient safety   Based on caregiver fall risk screen, instruct family/caregiver to ask for assistance with transferring infant if caregiver noted to have fall risk factors  Note: No falls reported or observed as of this documentation. Fall precaution continued and maintained.   Pt wearing non skid footwear and encouraged to seek out staff for any assistance needed. Problem: Pain  Goal: Verbalizes/displays adequate comfort level or baseline comfort level  Outcome: Progressing  Flowsheets (Taken 9/29/2022 2302)  Verbalizes/displays adequate comfort level or baseline comfort level:   Encourage patient to monitor pain and request assistance   Assess pain using appropriate pain scale   Administer analgesics based on type and severity of pain and evaluate response   Implement non-pharmacological measures as appropriate and evaluate response   Consider cultural and social influences on pain and pain management   Notify Licensed Independent Practitioner if interventions unsuccessful or patient reports new pain  Note: Pt complains of sciatica pain (right leg pain rates at 8/10. Staff educated pt of non-pharmacological pain intervention such as distraction, relaxation, and repositioning and rest, but no relief. Pt requested PRN pain medication, states pain medication provided minimal pain relief. On-call Internal Med provider contacted, awaiting response. Problem: Chronic Conditions and Co-morbidities  Goal: Patient's chronic conditions and co-morbidity symptoms are monitored and maintained or improved  Outcome: Progressing  Flowsheets (Taken 9/29/2022 2237)  Care Plan - Patient's Chronic Conditions and Co-Morbidity Symptoms are Monitored and Maintained or Improved: Monitor and assess patient's chronic conditions and comorbid symptoms for stability, deterioration, or improvement  Note: Pt is diabetic, blood glucose level well-controlled with currently prescribed treatment regimen.

## 2022-09-30 NOTE — GROUP NOTE
Group Therapy Note    Date: 9/30/2022    Group Start Time: 1100  Group End Time: 1130  Group Topic: Relaxation    DANIELLE Vernon Shown, CTRS        Group Therapy Note    Attendees: 4/14       Patient's Goal:  to improve relaxation using art     Notes:   pt was irritable but redirected     Status After Intervention:  Unchanged    Participation Level:  Active Listener    Participation Quality: Resistant      Speech:  normal      Thought Process/Content: Flight of ideas      Affective Functioning: Flat      Mood: irritable      Level of consciousness:  Alert      Response to Learning: Resistant      Endings: None Reported    Modes of Intervention: Socialization, Exploration, and Activity      Discipline Responsible: Psychoeducational Specialist      Signature:  Tony Choudhary

## 2022-09-30 NOTE — PROGRESS NOTES
Daily Progress Note  9/30/2022    Patient Name: Vincent Vargas: Acute psychosis         SUBJECTIVE:      Patient is seen today for a follow up assessment. Patient agreeable to follow-up assessment. She kept stating \"I do not want to talk to you\". She adds \"nothing is changed and nothing will change until I find a place and get out here\". When asked about symptoms patient continued to nod her head no, became irritable and was unwilling to answer any questions and kept requesting for me to leave. Per nursing staff, patient continues to refuse physical therapy and group. Patient continues to compliant with scheduled medication. She appears extremely anxious and agitated, continues to utilize as needed Atarax last use 9/29/22 at 2250. At this time the patient is not appropriate for a lower level of care. There is risk of decompensation and worsening.   Patient further hospitalization for safety and hospitalization     Appetite:  [x] Adequate/Unchanged  [] Increased  [] Decreased      Sleep:       [] Adequate/Unchanged  [x] Fair  [] Poor      Group Attendance on Unit:   [] Yes   [] Selectively    [x] No    Compliant with scheduled medications: [x] Yes  [] No    Received emergency medications in past 24 hrs: [] Yes   [x] No    Medication Side Effects: Denies          Mental Status Exam  Level of consciousness: Alert and awake   Appearance: Appropriate attire for setting, seated in wheelchair, with fair  grooming and hygiene   Behavior/Motor: Approachable, unwilling to engage with interviewer  Attitude toward examiner: Uncooperative, inattentive, no eye contact   Speech: spontaneous, rapid, and loud and irritable tone  Mood: Patient reports \"I do not want to talk to you\"   Affect: Irritable and easily frustrated  Thought processes: incoherent  Thought content: Unable to assess patient refuses to engage with interviewer   Suicidal Ideation: Unable to assess patient refuses to engage with interviewer  Delusions: No evidence of delusions. Perceptual Disturbance: Patient does not appear to be responding to internal stimuli. Cognition: Oriented to self, location, and disorganized to situation   Memory: impaired recent memory  Insight: poor   Judgement: poor       Data   height is 5' 5\" (1.651 m) and weight is 207 lb (93.9 kg). Her temperature is 97.5 °F (36.4 °C). Her blood pressure is 106/66 and her pulse is 95. Her respiration is 14.    Labs:   Admission on 09/22/2022   Component Date Value Ref Range Status    POC Glucose 09/22/2022 135 (A)  65 - 105 mg/dL Final    POC Glucose 09/23/2022 168 (A)  65 - 105 mg/dL Final    WBC 09/23/2022 5.3  3.5 - 11.0 k/uL Final    RBC 09/23/2022 2.72 (A)  4.0 - 5.2 m/uL Final    Hemoglobin 09/23/2022 8.6 (A)  12.0 - 16.0 g/dL Final    Hematocrit 09/23/2022 26.8 (A)  36 - 46 % Final    MCV 09/23/2022 98.8  80 - 100 fL Final    MCH 09/23/2022 31.8  26 - 34 pg Final    MCHC 09/23/2022 32.2  31 - 37 g/dL Final    RDW 09/23/2022 14.9  11.5 - 14.9 % Final    Platelets 09/83/6780 434  150 - 450 k/uL Final    MPV 09/23/2022 7.8  6.0 - 12.0 fL Final    Glucose 09/23/2022 315 (A)  70 - 99 mg/dL Final    BUN 09/23/2022 13  6 - 20 mg/dL Final    Creatinine 09/23/2022 0.78  0.50 - 0.90 mg/dL Final    Calcium 09/23/2022 8.7  8.6 - 10.4 mg/dL Final    Sodium 09/23/2022 135  135 - 144 mmol/L Final    Potassium 09/23/2022 4.8  3.7 - 5.3 mmol/L Final    Chloride 09/23/2022 101  98 - 107 mmol/L Final    CO2 09/23/2022 24  20 - 31 mmol/L Final    Anion Gap 09/23/2022 10  9 - 17 mmol/L Final    Alkaline Phosphatase 09/23/2022 243 (A)  35 - 104 U/L Final    ALT 09/23/2022 10  5 - 33 U/L Final    AST 09/23/2022 8  <32 U/L Final    Total Bilirubin 09/23/2022 0.2 (A)  0.3 - 1.2 mg/dL Final    Total Protein 09/23/2022 6.8  6.4 - 8.3 g/dL Final    Albumin 09/23/2022 2.7 (A)  3.5 - 5.2 g/dL Final    GFR Non- 09/23/2022 >60  >60 mL/min Final    GFR  09/23/2022 >60  >60 mL/min Final    GFR Comment 09/23/2022        Final    Comment: Average GFR for 52-63 years old:   80 mL/min/1.73sq m  Chronic Kidney Disease:   <60 mL/min/1.73sq m  Kidney failure:   <15 mL/min/1.73sq m              eGFR calculated using average adult body mass. Additional eGFR calculator available at:        [employer]            POC Glucose 09/23/2022 222 (A)  65 - 105 mg/dL Final    POC Glucose 09/23/2022 286 (A)  65 - 105 mg/dL Final    POC Glucose 09/23/2022 237 (A)  65 - 105 mg/dL Final    POC Glucose 09/24/2022 185 (A)  65 - 105 mg/dL Final    POC Glucose 09/24/2022 177 (A)  65 - 105 mg/dL Final    POC Glucose 09/24/2022 241 (A)  65 - 105 mg/dL Final    POC Glucose 09/24/2022 221 (A)  65 - 105 mg/dL Final    POC Glucose 09/24/2022 248 (A)  65 - 105 mg/dL Final    Hepatitis C Ab 09/26/2022 NONREACTIVE  NONREACTIVE Final    Comment:       The hepatitis C procedure used in our laboratory is a Chemiluminescent test specific for   three recombinant HCV antigens. A negative anti-HCV result indicates that the antibodies to   hepatitis C virus are not present at this time. Individuals with reactive anti-HCV should be considered infected and infectious until proven   otherwise. Confirmation of all equivocal or reactive results is recommended by ordering   HCV RNA by PCR. POC Glucose 09/25/2022 142 (A)  65 - 105 mg/dL Final    POC Glucose 09/25/2022 204 (A)  65 - 105 mg/dL Final    POC Glucose 09/25/2022 255 (A)  65 - 105 mg/dL Final    POC Glucose 09/25/2022 224 (A)  65 - 105 mg/dL Final    HIV Ag/Ab 09/26/2022 NONREACTIVE  NONREACTIVE Final    Comment: No laboratory evidence of HIV infection. If acute HIV infection is suspected, consider   testing for HIV-1 RNA.       POC Glucose 09/26/2022 140 (A)  65 - 105 mg/dL Final    POC Glucose 09/26/2022 248 (A)  65 - 105 mg/dL Final    POC Glucose 09/26/2022 279 (A)  65 - 105 mg/dL Final    POC Glucose 09/26/2022 331 (A) 65 - 105 mg/dL Final    POC Glucose 09/27/2022 196 (A)  65 - 105 mg/dL Final    POC Glucose 09/27/2022 227 (A)  65 - 105 mg/dL Final    POC Glucose 09/27/2022 311 (A)  65 - 105 mg/dL Final    POC Glucose 09/27/2022 295 (A)  65 - 105 mg/dL Final    POC Glucose 09/27/2022 301 (A)  65 - 105 mg/dL Final    POC Glucose 09/28/2022 267 (A)  65 - 105 mg/dL Final    POC Glucose 09/28/2022 334 (A)  65 - 105 mg/dL Final    POC Glucose 09/28/2022 270 (A)  65 - 105 mg/dL Final    POC Glucose 09/28/2022 248 (A)  65 - 105 mg/dL Final    POC Glucose 09/28/2022 251 (A)  65 - 105 mg/dL Final    POC Glucose 09/28/2022 235 (A)  65 - 105 mg/dL Final    Critical Noted    POC Glucose 09/28/2022 323 (A)  65 - 105 mg/dL Final    POC Glucose 09/28/2022 475 (A)  65 - 105 mg/dL Final    Critical Noted    POC Glucose 09/28/2022 452 (A)  65 - 105 mg/dL Final    Critical Noted    POC Glucose 09/28/2022 423 (A)  65 - 105 mg/dL Final    Critical Noted    Glucose 09/29/2022 299 (A)  70 - 99 mg/dL Final    BUN 09/29/2022 14  6 - 20 mg/dL Final    Creatinine 09/29/2022 1.05 (A)  0.50 - 0.90 mg/dL Final    Calcium 09/29/2022 8.4 (A)  8.6 - 10.4 mg/dL Final    Sodium 09/29/2022 138  135 - 144 mmol/L Final    Potassium 09/29/2022 5.0  3.7 - 5.3 mmol/L Final    Chloride 09/29/2022 103  98 - 107 mmol/L Final    CO2 09/29/2022 26  20 - 31 mmol/L Final    Anion Gap 09/29/2022 9  9 - 17 mmol/L Final    GFR Non- 09/29/2022 54 (A)  >60 mL/min Final    GFR  09/29/2022 >60  >60 mL/min Final    GFR Comment 09/29/2022        Final    Comment: Average GFR for 52-63 years old:   80 mL/min/1.73sq m  Chronic Kidney Disease:   <60 mL/min/1.73sq m  Kidney failure:   <15 mL/min/1.73sq m              eGFR calculated using average adult body mass.  Additional eGFR calculator available at:        [employer]            POC Glucose 09/29/2022 153 (A)  65 - 105 mg/dL Final    POC Glucose 09/29/2022 244 (A)  65 - 105 mg/dL Final    POC Glucose 09/29/2022 172 (A)  65 - 105 mg/dL Final    POC Glucose 09/30/2022 162 (A)  65 - 105 mg/dL Final    POC Glucose 09/29/2022 250 (A)  65 - 105 mg/dL Final    POC Glucose 09/30/2022 262 (A)  65 - 105 mg/dL Final         Reviewed patient's current plan of care and vital signs with nursing staff.     Labs reviewed: [x] Yes    Medications  Current Facility-Administered Medications: lidocaine 4 % external patch 1 patch, 1 patch, TransDERmal, Daily  insulin lispro (HUMALOG) injection vial 0-4 Units, 0-4 Units, SubCUTAneous, TID WC  insulin lispro (HUMALOG) injection vial 0-4 Units, 0-4 Units, SubCUTAneous, Nightly  metFORMIN (GLUCOPHAGE) tablet 1,000 mg, 1,000 mg, Oral, BID WC  insulin glargine (LANTUS) injection vial 17 Units, 17 Units, SubCUTAneous, BID  atorvastatin (LIPITOR) tablet 40 mg, 40 mg, Oral, Nightly  glucagon (rDNA) injection 1 mg, 1 mg, SubCUTAneous, PRN  glucose chewable tablet 16 g, 4 tablet, Oral, PRN  OLANZapine zydis (ZYPREXA) disintegrating tablet 15 mg, 15 mg, Oral, Nightly  pantoprazole (PROTONIX) tablet 40 mg, 40 mg, Oral, BID  pregabalin (LYRICA) capsule 75 mg, 75 mg, Oral, BID  acetaminophen (TYLENOL) tablet 650 mg, 650 mg, Oral, Q6H PRN  ibuprofen (ADVIL;MOTRIN) tablet 400 mg, 400 mg, Oral, Q6H PRN  hydrOXYzine HCl (ATARAX) tablet 50 mg, 50 mg, Oral, TID PRN  traZODone (DESYREL) tablet 50 mg, 50 mg, Oral, Nightly PRN  polyethylene glycol (GLYCOLAX) packet 17 g, 17 g, Oral, Daily PRN  aluminum & magnesium hydroxide-simethicone (MAALOX) 200-200-20 MG/5ML suspension 30 mL, 30 mL, Oral, Q6H PRN  nicotine polacrilex (NICORETTE) gum 2 mg, 2 mg, Oral, Q2H PRN  haloperidol lactate (HALDOL) injection 5 mg, 5 mg, IntraMUSCular, Q6H PRN **AND** LORazepam (ATIVAN) injection 2 mg, 2 mg, IntraMUSCular, Q6H PRN **AND** diphenhydrAMINE (BENADRYL) injection 50 mg, 50 mg, IntraMUSCular, Q6H PRN    ASSESSMENT  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 Continue current medication regiment and monitor, awaiting placement  Encourage participation in groups and milieu. Probable discharge is to be determined by MD    Electronically signed by LAILA Ocampo CNP on 9/30/2022 at 2:48 PM    I independently saw and evaluated the patient. I reviewed the nurse practioner's documentation above. Any additional comments or changes to the    documentation are stated below otherwise agree with assessment. The patient continues to be irritable. She has poor insight into her difficulties with activities of daily living and memory. She believes she can return to her sister who will be happy to have her brother patient has not spoken to her sister since her hospital admission at the very least.      PLAN  We will trial a rivastigmine patch  Attempt to develop insight  Expected Length of Stay is indeterminate days. Psycho-education conducted. Supportive Therapy conducted.   Follow-up daily while on inpatient unit    Electronically signed by So Pond MD on 9/30/22 at 7:18 PM EDT

## 2022-09-30 NOTE — PROGRESS NOTES
Behavioral Services                                              Medicare Re-Certification    I certify that the inpatient psychiatric hospital services furnished since the previous certification/re-certification were, and continue to be, medically necessary for;    [x] (1) Treatment which could reasonably be expected to improve the patient's condition,    [x] (2) Or for diagnostic study. Estimated length of stay/service 3 to  9 days    Plan for post-hospital care -outpatient care    This patient continues to need, on a daily basis, active treatment furnished directly by or requiring the supervision of inpatient psychiatric personnel.     Electronically signed by Bety Harris MD on 9/29/2022 at 10:41 PM

## 2022-09-30 NOTE — PLAN OF CARE
[id number] [facility] Interdisciplinary Treatment Plan Note     Review Date & Time: 9/30/22 1322    Patient was not in treatment team.    Estimated Length of Stay Update:     Estimated Discharge Date Update:     EDUCATION:   Learner Progress Toward Treatment Goals: Reviewed goals and plan of care    Method: Individual    Outcome: Refused Education    PATIENT GOALS: Patient refused to attend    PLAN/TREATMENT RECOMMENDATIONS UPDATE:       GOALS UPDATE:  Time frame for Short-Term Goals:         Dereck Garcia RN

## 2022-09-30 NOTE — CARE COORDINATION
SOCIAL SERVICE NOTE: CLAYTON completed PASSAR for PT possible skilled nursing placement. PT physician completed statement of Expert Evaluation for Guardianship. CLAYTON speaks with Kristy Urban on this date at [phone] from [facility] and discussed PT care related to [facility]. CLAYTON returns PT's daughter Tr Camargo call to [phone], CLAYTON gives the phone number to the [facility] [phone] and also the contact number of Lorenzo Miles at the probate court [phone], who works with the guardianship cases at the courts. CLAYTON also e-mails Ananya to [email or web address] [facility].  will continue to monitor the situation.

## 2022-09-30 NOTE — PROGRESS NOTES
Pt irritable complaining of severe sciatica pain to right leg rates at 10/1-10 at this time. Pt states she could not sleep because of the pain. PRN Tylenol, Ibuprofen and scheduled Lyrica given, but only provided minimal relief. On-call Internal Med provider Yelena Alvares CNP contacted, new order for pain relief obtained.

## 2022-09-30 NOTE — PROGRESS NOTES
Physical Therapy        Physical Therapy Cancel Note      DATE: 2022    NAME: Dixie Crawford  MRN: [id number]   : 1965      Patient not seen this date for Physical Therapy due to:  22 Pt Refusal - Writer calls [address] [place] [address] Nursing staff ask pt if agreeable to participate in PT Assessment and report back to writer that it is a definite NO for today with Pt audibly swearing profanities to nursing staff in response during this phone call inquiry.        Electronically signed by Sumanth Rogers PT on 2022 at 8:52 AM

## 2022-10-01 ENCOUNTER — APPOINTMENT (OUTPATIENT)
Dept: GENERAL RADIOLOGY | Age: 57
DRG: 751 | End: 2022-10-01
Attending: PSYCHIATRY & NEUROLOGY
Payer: COMMERCIAL

## 2022-10-01 ENCOUNTER — APPOINTMENT (OUTPATIENT)
Dept: CT IMAGING | Age: 57
DRG: 751 | End: 2022-10-01
Attending: PSYCHIATRY & NEUROLOGY
Payer: COMMERCIAL

## 2022-10-01 LAB
GLUCOSE BLD-MCNC: 122 MG/DL (ref 65–105)
GLUCOSE BLD-MCNC: 167 MG/DL (ref 65–105)
GLUCOSE BLD-MCNC: 179 MG/DL (ref 65–105)
GLUCOSE BLD-MCNC: 185 MG/DL (ref 65–105)

## 2022-10-01 PROCEDURE — 1240000000 HC EMOTIONAL WELLNESS R&B

## 2022-10-01 PROCEDURE — 6370000000 HC RX 637 (ALT 250 FOR IP): Performed by: NURSE PRACTITIONER

## 2022-10-01 PROCEDURE — 70450 CT HEAD/BRAIN W/O DYE: CPT

## 2022-10-01 PROCEDURE — 6370000000 HC RX 637 (ALT 250 FOR IP): Performed by: PSYCHIATRY & NEUROLOGY

## 2022-10-01 PROCEDURE — 71046 X-RAY EXAM CHEST 2 VIEWS: CPT

## 2022-10-01 PROCEDURE — 6370000000 HC RX 637 (ALT 250 FOR IP): Performed by: INTERNAL MEDICINE

## 2022-10-01 PROCEDURE — 99232 SBSQ HOSP IP/OBS MODERATE 35: CPT

## 2022-10-01 PROCEDURE — 82947 ASSAY GLUCOSE BLOOD QUANT: CPT

## 2022-10-01 RX ORDER — ALBUTEROL SULFATE 90 UG/1
2 AEROSOL, METERED RESPIRATORY (INHALATION) EVERY 6 HOURS PRN
Status: DISCONTINUED | OUTPATIENT
Start: 2022-10-01 | End: 2022-10-04

## 2022-10-01 RX ADMIN — IBUPROFEN 400 MG: 400 TABLET ORAL at 21:16

## 2022-10-01 RX ADMIN — ALBUTEROL SULFATE 2 PUFF: 90 AEROSOL, METERED RESPIRATORY (INHALATION) at 19:26

## 2022-10-01 RX ADMIN — PANTOPRAZOLE SODIUM 40 MG: 40 TABLET, DELAYED RELEASE ORAL at 21:06

## 2022-10-01 RX ADMIN — ATORVASTATIN CALCIUM 40 MG: 20 TABLET, FILM COATED ORAL at 21:06

## 2022-10-01 RX ADMIN — TRAZODONE HYDROCHLORIDE 50 MG: 50 TABLET ORAL at 21:06

## 2022-10-01 RX ADMIN — PREGABALIN 75 MG: 75 CAPSULE ORAL at 09:48

## 2022-10-01 RX ADMIN — HYDROXYZINE HYDROCHLORIDE 50 MG: 50 TABLET ORAL at 05:51

## 2022-10-01 RX ADMIN — OLANZAPINE 15 MG: 10 TABLET, ORALLY DISINTEGRATING ORAL at 21:06

## 2022-10-01 RX ADMIN — PANTOPRAZOLE SODIUM 40 MG: 40 TABLET, DELAYED RELEASE ORAL at 09:48

## 2022-10-01 RX ADMIN — PREGABALIN 75 MG: 75 CAPSULE ORAL at 21:06

## 2022-10-01 RX ADMIN — INSULIN GLARGINE 17 UNITS: 100 INJECTION, SOLUTION SUBCUTANEOUS at 09:48

## 2022-10-01 RX ADMIN — METFORMIN HYDROCHLORIDE 1000 MG: 1000 TABLET ORAL at 17:52

## 2022-10-01 RX ADMIN — METFORMIN HYDROCHLORIDE 1000 MG: 1000 TABLET ORAL at 09:48

## 2022-10-01 RX ADMIN — ACETAMINOPHEN 650 MG: 325 TABLET, FILM COATED ORAL at 15:25

## 2022-10-01 RX ADMIN — HYDROXYZINE HYDROCHLORIDE 50 MG: 50 TABLET ORAL at 21:06

## 2022-10-01 RX ADMIN — INSULIN GLARGINE 17 UNITS: 100 INJECTION, SOLUTION SUBCUTANEOUS at 20:57

## 2022-10-01 ASSESSMENT — PAIN DESCRIPTION - LOCATION
LOCATION: GENERALIZED
LOCATION: LEG

## 2022-10-01 ASSESSMENT — PAIN DESCRIPTION - DESCRIPTORS
DESCRIPTORS: ACHING
DESCRIPTORS: ACHING

## 2022-10-01 ASSESSMENT — PAIN SCALES - GENERAL
PAINLEVEL_OUTOF10: 3
PAINLEVEL_OUTOF10: 8

## 2022-10-01 ASSESSMENT — PAIN DESCRIPTION - ORIENTATION: ORIENTATION: RIGHT

## 2022-10-01 NOTE — GROUP NOTE
Group Therapy Note    Date: 10/1/2022    Group Start Time: 1030  Group End Time: 2576  Group Topic: Psychotherapy    STEPHANY Merchant, LSW        Group Therapy Note    Attendees: 7/15       Patient's Goal:  Increase interpersonal relationship skills    Notes:  Patient was an active participant in group discussion    Status After Intervention:  Unchanged    Participation Level:  Active Listener and Interactive    Participation Quality: Sharing, Inappropriate, Intrusive, and Resistant      Speech:  normal      Thought Process/Content: Perseverating      Affective Functioning: Constricted/Restricted      Mood: irritable      Level of consciousness:  Preoccupied      Response to Learning: Resistant      Endings: None Reported    Modes of Intervention: Socialization, Exploration, and Limit-setting      Discipline Responsible: /Counselor      Signature:  STEPHANY Hester, PATRICIA

## 2022-10-01 NOTE — PROGRESS NOTES
Daily Progress Note  10/1/2022    Patient Name: Antonio Green: Acute psychosis         SUBJECTIVE:      Patient is seen today for a follow up assessment. Patient has been compliant with part of scheduled medication, taking Zyprexa and refusing Humalog. When discussing reasons for refusal patient reports that it is because \"I have a lot going on\" patient agreed reason for admission surrounds an ability to care for self in the community and stated she will maintain compliance going forward with insulin. During interview patient endorses significant depression with suicidal ideation however denies plan. Patient denies homicidal ideation. Throughout interview patient was overall cooperative however somewhat irritable, tangential and observed crying at times. After interview ended patient came into the milieu and began yelling at staff and insulting peers. Patient was not easily redirected. Patient presents with no insight into ability to maintain safety inside or outside of the hospital and is unable to contract for safety in community at this time. Patient is denying auditory and visual hallucinations and denies paranoia. Patient is denying medication side effects.         Appetite:  [x] Adequate/Unchanged  [] Increased  [] Decreased      Sleep:       [] Adequate/Unchanged  [x] Fair  [] Poor      Group Attendance on Unit:   [] Yes   [x] Selectively    [] No    Medication Side Effects: Denies          Mental Status Exam  Level of consciousness: Alert and awake   Appearance: Appropriate attire for setting, seated in wheelchair, with fair  grooming and hygiene   Behavior/Motor: Approachable, agitated  Attitude toward examiner: Somewhat cooperative, fair attention, fair eye contact, irritable at times speech: Normal to loud volume, pressured  Mood: Patient reports \"I want to leave\"   Affect: Irritable  Thought processes: Illogical, limited  Thought content: Denies homicidal ideation  Suicidal Ideation: Endorses suicidal ideation. Denies plan. Unable to contract for safety outside the hospital.  Delusions: No evidence of delusions. Perceptual Disturbance: Patient does not appear to be responding to internal stimuli. Cognition: Oriented to self, location, and disorganized to situation   Memory: impaired recent memory  Insight: poor   Judgement: poor       Data   height is 5' 5\" (1.651 m) and weight is 207 lb (93.9 kg). Her temperature is 97.9 °F (36.6 °C). Her blood pressure is 114/78 and her pulse is 98. Her respiration is 14.    Labs:   Admission on 09/22/2022   Component Date Value Ref Range Status    POC Glucose 09/22/2022 135 (A)  65 - 105 mg/dL Final    POC Glucose 09/23/2022 168 (A)  65 - 105 mg/dL Final    WBC 09/23/2022 5.3  3.5 - 11.0 k/uL Final    RBC 09/23/2022 2.72 (A)  4.0 - 5.2 m/uL Final    Hemoglobin 09/23/2022 8.6 (A)  12.0 - 16.0 g/dL Final    Hematocrit 09/23/2022 26.8 (A)  36 - 46 % Final    MCV 09/23/2022 98.8  80 - 100 fL Final    MCH 09/23/2022 31.8  26 - 34 pg Final    MCHC 09/23/2022 32.2  31 - 37 g/dL Final    RDW 09/23/2022 14.9  11.5 - 14.9 % Final    Platelets 67/78/4651 434  150 - 450 k/uL Final    MPV 09/23/2022 7.8  6.0 - 12.0 fL Final    Glucose 09/23/2022 315 (A)  70 - 99 mg/dL Final    BUN 09/23/2022 13  6 - 20 mg/dL Final    Creatinine 09/23/2022 0.78  0.50 - 0.90 mg/dL Final    Calcium 09/23/2022 8.7  8.6 - 10.4 mg/dL Final    Sodium 09/23/2022 135  135 - 144 mmol/L Final    Potassium 09/23/2022 4.8  3.7 - 5.3 mmol/L Final    Chloride 09/23/2022 101  98 - 107 mmol/L Final    CO2 09/23/2022 24  20 - 31 mmol/L Final    Anion Gap 09/23/2022 10  9 - 17 mmol/L Final    Alkaline Phosphatase 09/23/2022 243 (A)  35 - 104 U/L Final    ALT 09/23/2022 10  5 - 33 U/L Final    AST 09/23/2022 8  <32 U/L Final    Total Bilirubin 09/23/2022 0.2 (A)  0.3 - 1.2 mg/dL Final    Total Protein 09/23/2022 6.8  6.4 - 8.3 g/dL Final    Albumin 09/23/2022 2.7 (A)  3.5 - 5.2 g/dL Final    GFR Non- 09/23/2022 >60  >60 mL/min Final    GFR  09/23/2022 >60  >60 mL/min Final    GFR Comment 09/23/2022        Final    Comment: Average GFR for 52-63 years old:   80 mL/min/1.73sq m  Chronic Kidney Disease:   <60 mL/min/1.73sq m  Kidney failure:   <15 mL/min/1.73sq m              eGFR calculated using average adult body mass. Additional eGFR calculator available at:        [employer]            POC Glucose 09/23/2022 222 (A)  65 - 105 mg/dL Final    POC Glucose 09/23/2022 286 (A)  65 - 105 mg/dL Final    POC Glucose 09/23/2022 237 (A)  65 - 105 mg/dL Final    POC Glucose 09/24/2022 185 (A)  65 - 105 mg/dL Final    POC Glucose 09/24/2022 177 (A)  65 - 105 mg/dL Final    POC Glucose 09/24/2022 241 (A)  65 - 105 mg/dL Final    POC Glucose 09/24/2022 221 (A)  65 - 105 mg/dL Final    POC Glucose 09/24/2022 248 (A)  65 - 105 mg/dL Final    Hepatitis C Ab 09/26/2022 NONREACTIVE  NONREACTIVE Final    Comment:       The hepatitis C procedure used in our laboratory is a Chemiluminescent test specific for   three recombinant HCV antigens. A negative anti-HCV result indicates that the antibodies to   hepatitis C virus are not present at this time. Individuals with reactive anti-HCV should be considered infected and infectious until proven   otherwise. Confirmation of all equivocal or reactive results is recommended by ordering   HCV RNA by PCR. POC Glucose 09/25/2022 142 (A)  65 - 105 mg/dL Final    POC Glucose 09/25/2022 204 (A)  65 - 105 mg/dL Final    POC Glucose 09/25/2022 255 (A)  65 - 105 mg/dL Final    POC Glucose 09/25/2022 224 (A)  65 - 105 mg/dL Final    HIV Ag/Ab 09/26/2022 NONREACTIVE  NONREACTIVE Final    Comment: No laboratory evidence of HIV infection. If acute HIV infection is suspected, consider   testing for HIV-1 RNA.       POC Glucose 09/26/2022 140 (A)  65 - 105 mg/dL Final    POC Glucose 09/26/2022 248 (A) 65 - 105 mg/dL Final    POC Glucose 09/26/2022 279 (A)  65 - 105 mg/dL Final    POC Glucose 09/26/2022 331 (A)  65 - 105 mg/dL Final    POC Glucose 09/27/2022 196 (A)  65 - 105 mg/dL Final    POC Glucose 09/27/2022 227 (A)  65 - 105 mg/dL Final    POC Glucose 09/27/2022 311 (A)  65 - 105 mg/dL Final    POC Glucose 09/27/2022 295 (A)  65 - 105 mg/dL Final    POC Glucose 09/27/2022 301 (A)  65 - 105 mg/dL Final    POC Glucose 09/28/2022 267 (A)  65 - 105 mg/dL Final    POC Glucose 09/28/2022 334 (A)  65 - 105 mg/dL Final    POC Glucose 09/28/2022 270 (A)  65 - 105 mg/dL Final    POC Glucose 09/28/2022 248 (A)  65 - 105 mg/dL Final    POC Glucose 09/28/2022 251 (A)  65 - 105 mg/dL Final    POC Glucose 09/28/2022 235 (A)  65 - 105 mg/dL Final    Critical Noted    POC Glucose 09/28/2022 323 (A)  65 - 105 mg/dL Final    POC Glucose 09/28/2022 475 (A)  65 - 105 mg/dL Final    Critical Noted    POC Glucose 09/28/2022 452 (A)  65 - 105 mg/dL Final    Critical Noted    POC Glucose 09/28/2022 423 (A)  65 - 105 mg/dL Final    Critical Noted    Glucose 09/29/2022 299 (A)  70 - 99 mg/dL Final    BUN 09/29/2022 14  6 - 20 mg/dL Final    Creatinine 09/29/2022 1.05 (A)  0.50 - 0.90 mg/dL Final    Calcium 09/29/2022 8.4 (A)  8.6 - 10.4 mg/dL Final    Sodium 09/29/2022 138  135 - 144 mmol/L Final    Potassium 09/29/2022 5.0  3.7 - 5.3 mmol/L Final    Chloride 09/29/2022 103  98 - 107 mmol/L Final    CO2 09/29/2022 26  20 - 31 mmol/L Final    Anion Gap 09/29/2022 9  9 - 17 mmol/L Final    GFR Non- 09/29/2022 54 (A)  >60 mL/min Final    GFR  09/29/2022 >60  >60 mL/min Final    GFR Comment 09/29/2022        Final    Comment: Average GFR for 52-63 years old:   80 mL/min/1.73sq m  Chronic Kidney Disease:   <60 mL/min/1.73sq m  Kidney failure:   <15 mL/min/1.73sq m              eGFR calculated using average adult body mass.  Additional eGFR calculator available at: [email or web address]            POC Glucose 09/29/2022 153 (A)  65 - 105 mg/dL Final    POC Glucose 09/29/2022 244 (A)  65 - 105 mg/dL Final    POC Glucose 09/29/2022 172 (A)  65 - 105 mg/dL Final    POC Glucose 09/30/2022 162 (A)  65 - 105 mg/dL Final    POC Glucose 09/29/2022 250 (A)  65 - 105 mg/dL Final    POC Glucose 09/30/2022 262 (A)  65 - 105 mg/dL Final    POC Glucose 09/30/2022 199 (A)  65 - 105 mg/dL Final    POC Glucose 09/30/2022 239 (A)  65 - 105 mg/dL Final    POC Glucose 10/01/2022 122 (A)  65 - 105 mg/dL Final    POC Glucose 10/01/2022 179 (A)  65 - 105 mg/dL Final         Reviewed patient's current plan of care and vital signs with nursing staff.     Labs reviewed: [x] Yes    Medications  Current Facility-Administered Medications: rivastigmine (EXELON) 4.6 MG/24HR 1 patch, 1 patch, TransDERmal, Daily  lidocaine 4 % external patch 1 patch, 1 patch, TransDERmal, Daily  insulin lispro (HUMALOG) injection vial 0-4 Units, 0-4 Units, SubCUTAneous, TID WC  insulin lispro (HUMALOG) injection vial 0-4 Units, 0-4 Units, SubCUTAneous, Nightly  metFORMIN (GLUCOPHAGE) tablet 1,000 mg, 1,000 mg, Oral, BID WC  insulin glargine (LANTUS) injection vial 17 Units, 17 Units, SubCUTAneous, BID  atorvastatin (LIPITOR) tablet 40 mg, 40 mg, Oral, Nightly  glucagon (rDNA) injection 1 mg, 1 mg, SubCUTAneous, PRN  glucose chewable tablet 16 g, 4 tablet, Oral, PRN  OLANZapine zydis (ZYPREXA) disintegrating tablet 15 mg, 15 mg, Oral, Nightly  pantoprazole (PROTONIX) tablet 40 mg, 40 mg, Oral, BID  pregabalin (LYRICA) capsule 75 mg, 75 mg, Oral, BID  acetaminophen (TYLENOL) tablet 650 mg, 650 mg, Oral, Q6H PRN  ibuprofen (ADVIL;MOTRIN) tablet 400 mg, 400 mg, Oral, Q6H PRN  hydrOXYzine HCl (ATARAX) tablet 50 mg, 50 mg, Oral, TID PRN  traZODone (DESYREL) tablet 50 mg, 50 mg, Oral, Nightly PRN  polyethylene glycol (GLYCOLAX) packet 17 g, 17 g, Oral, Daily PRN  aluminum & magnesium hydroxide-simethicone (MAALOX) 200-200-20 MG/5ML suspension 30 mL, 30 mL, Oral, Q6H PRN  nicotine polacrilex (NICORETTE) gum 2 mg, 2 mg, Oral, Q2H PRN  haloperidol lactate (HALDOL) injection 5 mg, 5 mg, IntraMUSCular, Q6H PRN **AND** LORazepam (ATIVAN) injection 2 mg, 2 mg, IntraMUSCular, Q6H PRN **AND** diphenhydrAMINE (BENADRYL) injection 50 mg, 50 mg, IntraMUSCular, Q6H PRN    ASSESSMENT  Unspecified psychosis not due to a substance or known physiological condition [facility])         PLAN  Patient symptoms: Remain unstable  Continue current medication regimen. Monitor need and frequency of PRN medications. Encourage participation in groups and milieu. Attempt to develop insight. Psycho-education conducted. Supportive Therapy conducted. Probable discharge is per attending physician. Follow-up daily while inpatient. Patient continues to be monitored in the inpatient psychiatric facility at [facility] for safety and stabilization. Patient continues to need, on a daily basis, active treatment furnished directly by or requiring the supervision of inpatient psychiatric personnel. Electronically signed by LAILA Claudio CNP on 10/1/2022 at 2:41 PM    **This report has been created using voice recognition software. It may contain minor errors which are inherent in voice recognition technology. **

## 2022-10-01 NOTE — BH NOTE
Patient at nurses station stating \" I don't feel well\". Writer approach patient with vitals machine requesting to check blood pressure and pulse ox, patient refused at this time. Stating \" I don't need that\". Patient does not appear to be in any distress at this time. Explained to patient in order to accurately assess her and give the doctor adequate information I would need to assess her. Patient continued to decline and went to room.

## 2022-10-01 NOTE — PLAN OF CARE
Problem: Anxiety  Goal: Will report anxiety at manageable levels  Description: INTERVENTIONS:  1. Administer medication as ordered  2. Teach and rehearse alternative coping skills  3. Provide emotional support with 1:1 interaction with staff  10/1/2022 1153 by Piero Diaz LPN  Outcome: Not Progressing  Flowsheets (Taken 10/1/2022 1153)  Will report anxiety at manageable levels: Teach and rehearse alternative coping skills  Note: Patient continues to have increase in anxiety, labile, tangential, with intermittent outburst. Patient given support and redirections as needed. Problem: Safety - Adult  Goal: Free from fall injury  10/1/2022 1127 by Piero Diaz LPN  Outcome: Progressing  Flowsheets (Taken 10/1/2022 1127)  Free From Fall Injury:   Based on caregiver fall risk screen, instruct family/caregiver to ask for assistance with transferring infant if caregiver noted to have fall risk factors   Instruct family/caregiver on patient safety  Note: Patient remains free from falls, patient demonstrates correct use of wheel chair. Non slip socks in place. Environment maintained for safety. Problem: Self Harm/Suicidality  Goal: Will have no self-injury during hospital stay  Description: INTERVENTIONS:  1. Q 15 MINUTES: Routine safety checks  2. Q SHIFT & PRN: Assess risk to determine if routine checks are adequate to maintain patient safety  10/1/2022 1153 by Piero Diaz LPN  Outcome: Progressing  Note: No violent or negative behaviors noted at this time. Will cont to provide safe, calm, environment and use verbal de-escalation techniques when needed. Support and reassurance given as needed.

## 2022-10-01 NOTE — BH NOTE
Staff called during shift report to check patient, patient was seated on the floor, stated she fell. Staff assessed patient vitals signs 99% RA, pulse 101, blood pressure 118/88, patient reported bilateral leg pain, pedal pulses intact no deficit in sensation or movement. Patient reports pain in the back of her head, no redness bump or discoloration noted, pupils equal round reactive at 3 mm with brisk accomodation. Patient is oriented to place time and situation, becomes increasing anxious and tearful when discussing circumstances related to admission, no disorientation noted. Sensation movement and pulses intact in bilateral upper extremities, patient assisted to a seated position and assisted to stand and pivot to bed. Patient reports feeling that her asthma is bothering her, increased respiratory rate noted no wheezes auscultated upon assessment. Pulse ox recheck again 99% on room air. Vitals and assessment reported to unit staff.

## 2022-10-01 NOTE — BH NOTE
Both internal med and psych doctors were notified about unwitnessed fall. New order of chest xray and Ct put in and patient taken down for those. Breathing treatment and rescue inhaler ordered. Patient is being very aggressive with staff and saying we did nothing for her.

## 2022-10-01 NOTE — PLAN OF CARE
Problem: Self Harm/Suicidality  Goal: Will have no self-injury during hospital stay  Description: INTERVENTIONS:  1. Q 30 MINUTES: Routine safety checks  2. Q SHIFT & PRN: Assess risk to determine if routine checks are adequate to maintain patient safety  10/1/2022 0416 by Norbert Diallo RN  Outcome: Progressing  Note: Pt denies any suicidal or homicidal ideations at this time. Pt agreed to seek staff and report any intrusive thoughts of hurting self or others. Regular rounding 4 times an hour and spontaneous patient checks done for safety and per unit policy. Pt remains free from any self-harm, safe environment maintained. Will continue to provide emotional support and reassurance as needed. Problem: Anxiety  Goal: Will report anxiety at manageable levels  Description: INTERVENTIONS:  1. Administer medication as ordered  2. Teach and rehearse alternative coping skills  3. Provide emotional support with 1:1 interaction with staff  10/1/2022 0416 by Norbert Diallo RN  Outcome: Progressing  Flowsheets (Taken 10/1/2022 3017)  Will report anxiety at manageable levels:   Administer medication as ordered   Provide emotional support with 1:1 interaction with staff   Teach and rehearse alternative coping skills  Note: Patient is complaining of anxiety at this time,  feeling restless and having trouble sleeping and calming down in order to rest this evening. PRN medication was given as ordered for increased anxiety. Problem: Confusion  Goal: Confusion, delirium, dementia, or psychosis is improved or at baseline  Description: INTERVENTIONS:  1. Assess for possible contributors to thought disturbance, including medications, impaired vision or hearing, underlying metabolic abnormalities, dehydration, psychiatric diagnoses, and notify attending LIP  2. [place] high risk fall precautions, as indicated  3. Provide frequent short contacts to provide reality reorientation, refocusing and direction  4. Decrease environmental stimuli, including noise as appropriate  5. Monitor and intervene to maintain adequate nutrition, hydration, elimination, sleep and activity  6. If unable to ensure safety without constant attention obtain sitter and review sitter guidelines with assigned personnel  7. Initiate Psychosocial CNS and Spiritual Care consult, as indicated  10/1/2022 0416 by Norbert Diallo RN  Outcome: Progressing  Flowsheets (Taken 10/1/2022 0416)  Effect of thought disturbance (confusion, delirium, dementia, or psychosis) are managed with adequate functional status:   Assess for contributors to thought disturbance, including medications, impaired vision or hearing, underlying metabolic abnormalities, dehydration, psychiatric diagnoses, notify LIP   Provide frequent short contacts to provide reality reorientation, refocusing and direction   Decrease environmental stimuli, including noise as appropriate   Monitor and intervene to maintain adequate nutrition, hydration, elimination, sleep and activity     Problem: Safety - Adult  Goal: Free from fall injury  10/1/2022 0416 by Norbert Diallo RN  Outcome: Progressing  Flowsheets (Taken 10/1/2022 0416)  Free From Fall Injury:   Instruct family/caregiver on patient safety   Based on caregiver fall risk screen, instruct family/caregiver to ask for assistance with transferring infant if caregiver noted to have fall risk factors  Note: No falls reported or observed as of this documentation. Fall precaution continued and maintained. Pt wearing non skid footwear and encouraged to seek out staff for any assistance needed.         Problem: Chronic Conditions and Co-morbidities  Goal: Patient's chronic conditions and co-morbidity symptoms are monitored and maintained or improved  Outcome: Progressing  Flowsheets (Taken 10/1/2022 0416)  Care Plan - Patient's Chronic Conditions and Co-Morbidity Symptoms are Monitored and Maintained or Improved: Monitor and assess patient's chronic conditions and comorbid symptoms for stability, deterioration, or improvement

## 2022-10-02 LAB
GLUCOSE BLD-MCNC: 166 MG/DL (ref 65–105)
GLUCOSE BLD-MCNC: 271 MG/DL (ref 65–105)
GLUCOSE BLD-MCNC: 315 MG/DL (ref 65–105)
GLUCOSE BLD-MCNC: 324 MG/DL (ref 65–105)

## 2022-10-02 PROCEDURE — 6370000000 HC RX 637 (ALT 250 FOR IP): Performed by: PSYCHIATRY & NEUROLOGY

## 2022-10-02 PROCEDURE — 6370000000 HC RX 637 (ALT 250 FOR IP): Performed by: INTERNAL MEDICINE

## 2022-10-02 PROCEDURE — 82947 ASSAY GLUCOSE BLOOD QUANT: CPT

## 2022-10-02 PROCEDURE — 99232 SBSQ HOSP IP/OBS MODERATE 35: CPT

## 2022-10-02 PROCEDURE — 1240000000 HC EMOTIONAL WELLNESS R&B

## 2022-10-02 PROCEDURE — 6370000000 HC RX 637 (ALT 250 FOR IP): Performed by: NURSE PRACTITIONER

## 2022-10-02 RX ADMIN — PANTOPRAZOLE SODIUM 40 MG: 40 TABLET, DELAYED RELEASE ORAL at 21:09

## 2022-10-02 RX ADMIN — INSULIN GLARGINE 17 UNITS: 100 INJECTION, SOLUTION SUBCUTANEOUS at 21:07

## 2022-10-02 RX ADMIN — OLANZAPINE 15 MG: 10 TABLET, ORALLY DISINTEGRATING ORAL at 21:09

## 2022-10-02 RX ADMIN — INSULIN LISPRO 4 UNITS: 100 INJECTION, SOLUTION INTRAVENOUS; SUBCUTANEOUS at 21:05

## 2022-10-02 RX ADMIN — HYDROXYZINE HYDROCHLORIDE 50 MG: 50 TABLET ORAL at 21:09

## 2022-10-02 RX ADMIN — IBUPROFEN 400 MG: 400 TABLET ORAL at 21:09

## 2022-10-02 RX ADMIN — ATORVASTATIN CALCIUM 40 MG: 20 TABLET, FILM COATED ORAL at 21:09

## 2022-10-02 RX ADMIN — INSULIN LISPRO 2 UNITS: 100 INJECTION, SOLUTION INTRAVENOUS; SUBCUTANEOUS at 11:42

## 2022-10-02 RX ADMIN — INSULIN GLARGINE 17 UNITS: 100 INJECTION, SOLUTION SUBCUTANEOUS at 08:14

## 2022-10-02 RX ADMIN — TRAZODONE HYDROCHLORIDE 50 MG: 50 TABLET ORAL at 21:09

## 2022-10-02 RX ADMIN — PREGABALIN 75 MG: 75 CAPSULE ORAL at 21:09

## 2022-10-02 RX ADMIN — INSULIN LISPRO 3 UNITS: 100 INJECTION, SOLUTION INTRAVENOUS; SUBCUTANEOUS at 17:08

## 2022-10-02 RX ADMIN — PREGABALIN 75 MG: 75 CAPSULE ORAL at 08:14

## 2022-10-02 RX ADMIN — PANTOPRAZOLE SODIUM 40 MG: 40 TABLET, DELAYED RELEASE ORAL at 08:14

## 2022-10-02 ASSESSMENT — PAIN DESCRIPTION - ORIENTATION: ORIENTATION: RIGHT;LEFT

## 2022-10-02 ASSESSMENT — PAIN DESCRIPTION - DESCRIPTORS: DESCRIPTORS: ACHING;SHARP;SHOOTING

## 2022-10-02 ASSESSMENT — PAIN SCALES - GENERAL
PAINLEVEL_OUTOF10: 0
PAINLEVEL_OUTOF10: 8
PAINLEVEL_OUTOF10: 4

## 2022-10-02 ASSESSMENT — LIFESTYLE VARIABLES: HOW OFTEN DO YOU HAVE A DRINK CONTAINING ALCOHOL: NEVER

## 2022-10-02 ASSESSMENT — PAIN DESCRIPTION - LOCATION: LOCATION: LEG

## 2022-10-02 ASSESSMENT — PAIN - FUNCTIONAL ASSESSMENT: PAIN_FUNCTIONAL_ASSESSMENT: PREVENTS OR INTERFERES SOME ACTIVE ACTIVITIES AND ADLS

## 2022-10-02 NOTE — GROUP NOTE
Group Therapy Note    Date: 10/2/2022    Group Start Time: 1400  Group End Time: 7902  Group Topic: Psychoeducation    [facility] Janeece Blizzard, MSW, PATRICIA        Group Therapy Note    Attendees: 7/17       Patient's Goal:  Increase interpersonal relationship skills    Notes:  Patient was an active participant in group activity. She did require redirection a few times and identification of boundaries when becoming intrusive to SW but was successful with redirection. Status After Intervention:  Unchanged    Participation Level:  Active Listener and Interactive    Participation Quality: Appropriate, Attentive, and Sharing      Speech:  normal      Thought Process/Content: Logical      Affective Functioning: Congruent      Mood: euthymic      Level of consciousness:  Alert and Oriented x4      Response to Learning: Able to verbalize current knowledge/experience and Able to verbalize/acknowledge new learning      Endings: None Reported    Modes of Intervention: Support, Socialization, Activity, and Limit-setting      Discipline Responsible: /Counselor      Signature:  STEPHANY Paredes, PATRICIA

## 2022-10-02 NOTE — GROUP NOTE
Group Therapy Note    Date: 10/2/2022    Group Start Time: 0900  Group End Time: 0933  Group Topic: Community Meeting    DANIELLE Barajas        Group Therapy Note    Attendees:08/17       Patient's Goal:  Work on [employer]"    Notes:  controlled    Status After Intervention:  Unchanged    Participation Level: Active Listener and Interactive    Participation Quality: Appropriate and Attentive      Speech:  normal      Thought Process/Content: Logical      Affective Functioning: Congruent      Mood: depressed      Level of consciousness:  Alert and Attentive      Response to Learning: Able to verbalize current knowledge/experience and Progressing to goal      Endings: None Reported    Modes of Intervention: Education, Support, and Socialization      Discipline Responsible: Behavorial Health Tech      Signature:   Tali Barajas

## 2022-10-02 NOTE — GROUP NOTE
Group Therapy Note    Date: 10/1/2022    Group Start Time: 2005  Group End Time: 2030  Group Topic: Relaxation    [facility]    Paresh Dawkins RN      Group Therapy Note    Attendees: 10/16       Patient's Goal:  To acquire coping skills by developing relaxation techniques    Notes:  Pt attended and actively participated in the Relaxation group this evening.     Status After Intervention:  Improved    Participation Level: Interactive    Participation Quality: Appropriate and Attentive    Speech:  normal    Thought Process/Content: Logical    Affective Functioning: Congruent    Mood: calm and attempting to relax    Level of consciousness:  Alert and Oriented x3    Response to Learning: Progressing to goal    Endings: None Reported    Modes of Intervention: Education, Support, and Exploration    Discipline Responsible: Registered Nurse        Signature:  Paresh Dawkins RN

## 2022-10-02 NOTE — BH NOTE
SAFETY DRILL completed on unit. All edith of unit checked for contraband. Food removed from several rooms.  No safety concerns expressed by pts

## 2022-10-02 NOTE — PLAN OF CARE
Problem: Self Harm/Suicidality  Goal: Will have no self-injury during hospital stay  Description: INTERVENTIONS:  1. Q 30 MINUTES: Routine safety checks  2. Q SHIFT & PRN: Assess risk to determine if routine checks are adequate to maintain patient safety  10/2/2022 0051 by Giovanni Gordon RN  Outcome: Progressing  Note: Pt denies any suicidal or homicidal ideations at this time. Pt agreed to seek staff and report any intrusive thoughts of hurting self or others. Regular rounding 4 times an hour and spontaneous patient checks done for safety and per unit policy. Pt remains free from any self-harm, safe environment maintained. Will continue to provide emotional support and reassurance as needed. Problem: Confusion  Goal: Confusion, delirium, dementia, or psychosis is improved or at baseline  Description: INTERVENTIONS:  1. Assess for possible contributors to thought disturbance, including medications, impaired vision or hearing, underlying metabolic abnormalities, dehydration, psychiatric diagnoses, and notify attending LIP  2. [place] high risk fall precautions, as indicated  3. Provide frequent short contacts to provide reality reorientation, refocusing and direction  4. Decrease environmental stimuli, including noise as appropriate  5. Monitor and intervene to maintain adequate nutrition, hydration, elimination, sleep and activity  6. If unable to ensure safety without constant attention obtain sitter and review sitter guidelines with assigned personnel  7.  Initiate Psychosocial CNS and Spiritual Care consult, as indicated  Outcome: Progressing  Flowsheets (Taken 10/2/2022 0051)  Effect of thought disturbance (confusion, delirium, dementia, or psychosis) are managed with adequate functional status:   [place] high risk fall precautions, as indicated   Assess for contributors to thought disturbance, including medications, impaired vision or hearing, underlying metabolic abnormalities, dehydration, psychiatric diagnoses, notify LIP   Provide frequent short contacts to provide reality reorientation, refocusing and direction   Decrease environmental stimuli, including noise as appropriate   Monitor and intervene to maintain adequate nutrition, hydration, elimination, sleep and activity     Problem: Anxiety  Goal: Will report anxiety at manageable levels  Description: INTERVENTIONS:  1. Administer medication as ordered  2. Teach and rehearse alternative coping skills  3. Provide emotional support with 1:1 interaction with staff  10/2/2022 0051 by Suma Espinoza RN  Outcome: Progressing  Flowsheets (Taken 10/2/2022 0051)  Will report anxiety at manageable levels:   Administer medication as ordered   Provide emotional support with 1:1 interaction with staff   Teach and rehearse alternative coping skills  Note: Pt observed to have increased anxiety and agitation, but redirectable. Pt given PRN medication for increased anxiety as ordered. Problem: Safety - Adult  Goal: Free from fall injury  10/2/2022 0051 by Suma Espinoza RN  Outcome: Progressing  Note: No injury noted related to recent fall. No falls reported or observed as of this documentation. Fall precaution continued and maintained. Pt wearing non skid footwear and encouraged to seek out staff for any assistance needed. Problem: Chronic Conditions and Co-morbidities  Goal: Patient's chronic conditions and co-morbidity symptoms are monitored and maintained or improved  Outcome: Progressing  Flowsheets (Taken 10/2/2022 0051)  Care Plan - Patient's Chronic Conditions and Co-Morbidity Symptoms are Monitored and Maintained or Improved: Monitor and assess patient's chronic conditions and comorbid symptoms for stability, deterioration, or improvement  Note: Pt's blood glucose well maintained with current treatment regimen.      Problem: Pain  Goal: Verbalizes/displays adequate comfort level or baseline comfort level  Outcome: Progressing  Flowsheets (Taken 10/2/2022 0051)  Verbalizes/displays adequate comfort level or baseline comfort level:   Encourage patient to monitor pain and request assistance   Assess pain using appropriate pain scale   Administer analgesics based on type and severity of pain and evaluate response   Implement non-pharmacological measures as appropriate and evaluate response   Consider cultural and social influences on pain and pain management  Note: Pt complains of generalized pain rates at 8/10. Staff educated pt of non pharmacological pain intervention such as distraction, relaxation, and rest, but pt declines. Pt requested PRN analgesic, states pain medication helps decrease pain level.

## 2022-10-03 LAB
GLUCOSE BLD-MCNC: 232 MG/DL (ref 65–105)
GLUCOSE BLD-MCNC: 371 MG/DL (ref 65–105)
GLUCOSE BLD-MCNC: 407 MG/DL (ref 65–105)
GLUCOSE BLD-MCNC: 410 MG/DL (ref 65–105)

## 2022-10-03 PROCEDURE — 97530 THERAPEUTIC ACTIVITIES: CPT

## 2022-10-03 PROCEDURE — 6370000000 HC RX 637 (ALT 250 FOR IP): Performed by: PSYCHIATRY & NEUROLOGY

## 2022-10-03 PROCEDURE — APPSS30 APP SPLIT SHARED TIME 16-30 MINUTES

## 2022-10-03 PROCEDURE — 99232 SBSQ HOSP IP/OBS MODERATE 35: CPT | Performed by: PSYCHIATRY & NEUROLOGY

## 2022-10-03 PROCEDURE — 6370000000 HC RX 637 (ALT 250 FOR IP): Performed by: NURSE PRACTITIONER

## 2022-10-03 PROCEDURE — 82947 ASSAY GLUCOSE BLOOD QUANT: CPT

## 2022-10-03 PROCEDURE — 6370000000 HC RX 637 (ALT 250 FOR IP): Performed by: INTERNAL MEDICINE

## 2022-10-03 PROCEDURE — 1240000000 HC EMOTIONAL WELLNESS R&B

## 2022-10-03 RX ADMIN — IBUPROFEN 400 MG: 400 TABLET ORAL at 22:09

## 2022-10-03 RX ADMIN — INSULIN LISPRO 4 UNITS: 100 INJECTION, SOLUTION INTRAVENOUS; SUBCUTANEOUS at 21:11

## 2022-10-03 RX ADMIN — TRAZODONE HYDROCHLORIDE 50 MG: 50 TABLET ORAL at 21:13

## 2022-10-03 RX ADMIN — INSULIN GLARGINE 17 UNITS: 100 INJECTION, SOLUTION SUBCUTANEOUS at 09:27

## 2022-10-03 RX ADMIN — OLANZAPINE 15 MG: 10 TABLET, ORALLY DISINTEGRATING ORAL at 21:11

## 2022-10-03 RX ADMIN — INSULIN GLARGINE 17 UNITS: 100 INJECTION, SOLUTION SUBCUTANEOUS at 21:11

## 2022-10-03 RX ADMIN — INSULIN LISPRO 4 UNITS: 100 INJECTION, SOLUTION INTRAVENOUS; SUBCUTANEOUS at 11:36

## 2022-10-03 RX ADMIN — HYDROXYZINE HYDROCHLORIDE 50 MG: 50 TABLET ORAL at 09:21

## 2022-10-03 RX ADMIN — INSULIN LISPRO 4 UNITS: 100 INJECTION, SOLUTION INTRAVENOUS; SUBCUTANEOUS at 18:48

## 2022-10-03 RX ADMIN — INSULIN LISPRO 1 UNITS: 100 INJECTION, SOLUTION INTRAVENOUS; SUBCUTANEOUS at 09:27

## 2022-10-03 RX ADMIN — HYDROXYZINE HYDROCHLORIDE 50 MG: 50 TABLET ORAL at 21:12

## 2022-10-03 RX ADMIN — ATORVASTATIN CALCIUM 40 MG: 20 TABLET, FILM COATED ORAL at 21:12

## 2022-10-03 RX ADMIN — PANTOPRAZOLE SODIUM 40 MG: 40 TABLET, DELAYED RELEASE ORAL at 21:13

## 2022-10-03 RX ADMIN — PREGABALIN 75 MG: 75 CAPSULE ORAL at 09:21

## 2022-10-03 RX ADMIN — ACETAMINOPHEN 650 MG: 325 TABLET, FILM COATED ORAL at 21:20

## 2022-10-03 RX ADMIN — PREGABALIN 75 MG: 75 CAPSULE ORAL at 21:13

## 2022-10-03 NOTE — GROUP NOTE
Group Therapy Note    Attendees: 6 out of 19           Patient's Goal:  discharge planning, attend group, talk with     Notes:      Status After Intervention:  Improved    Participation Level:  Active Listener    Participation Quality: Appropriate      Speech:  normal      Thought Process/Content: Logical      Affective Functioning: Congruent      Mood: stable      Level of consciousness:  Alert      Response to Learning: Able to verbalize current knowledge/experience      Endings: None Reported    Modes of Intervention: Problem-solving      Discipline Responsible: Registered Nurse      Signature:  Celestine Urias RN

## 2022-10-03 NOTE — BH NOTE
Patient's blood sugar results 407. Humalog administered per sliding scale as ordered. Dr. Fidel Sanderson notified by perfect serve of results. Awaiting response. Patient denies symptoms of hyperglycemia and no signs observed. Will monitor. Safety maintained.

## 2022-10-03 NOTE — BH NOTE
Enrico Schwab, NP arrived on unit, was notified of pt's bs of 407 this afternoon and  371 at dinner time, pt was give 4u of Humalog each time for coverage, no s/s of distress noted. No new orders received.

## 2022-10-03 NOTE — PROGRESS NOTES
*Patient stayed after group and shared some issues with Val Weiss and myself       10/03/22 1428   Encounter Summary   Encounter Overview/Reason  Spiritual/Emotional Needs   Service Provided For: Patient   Referral/Consult From: Zac   Last Encounter  10/03/22   Complexity of Encounter Moderate   Begin Time 0130   End Time  0215   Total Time Calculated 45 min   Spiritual/Emotional needs   Type Spiritual Support   Behavioral Health    Type  Spirituality Group   Assessment/Intervention/Outcome   Assessment Calm;Coping; Hopeful   Intervention Active listening;Discussed relationship with God;Nurtured Hope   Outcome Receptive; Expressed Gratitude;Engaged in conversation;Encouraged;Coping

## 2022-10-03 NOTE — PLAN OF CARE
Problem: Chronic Conditions and Co-morbidities  Goal: Patient's chronic conditions and co-morbidity symptoms are monitored and maintained or improved  Outcome: Progressing     Problem: Anxiety  Goal: Will report anxiety at manageable levels  Description: INTERVENTIONS:  1. Administer medication as ordered  2. Teach and rehearse alternative coping skills  3. Provide emotional support with 1:1 interaction with staff  Flowsheets (Taken 10/3/2022 1731)  Will report anxiety at manageable levels:   Teach and rehearse alternative coping skills   Administer medication as ordered   Provide emotional support with 1:1 interaction with staff     Problem: Safety - Adult  Goal: Free from fall injury  Outcome: Progressing  Flowsheets (Taken 10/2/2022 2347 by Venkat Hernandez RN)  Free From Fall Injury:   Instruct family/caregiver on patient safety   Based on caregiver fall risk screen, instruct family/caregiver to ask for assistance with transferring infant if caregiver noted to have fall risk factors  Note:   Patient remains free of falls and verbalizes understanding of individual fall risks. Patient uses wheelchair in day room. Patient wearing non skid footwear and encouraged to seek out staff for any assistance needed. Problem: Pain  Goal: Verbalizes/displays adequate comfort level or baseline comfort level  Outcome: Progressing     Problem: Self Harm/Suicidality  Goal: Will have no self-injury during hospital stay  Description: INTERVENTIONS:  1. Q 30 MINUTES: Routine safety checks  2. Q SHIFT & PRN: Assess risk to determine if routine checks are adequate to maintain patient safety  Outcome: Progressing  Note:    Patient denies thoughts of suicide or self-harm and agrees to seek out staff should negative thoughts arise. Patient denies hallucinations. Patient had a tearful outburst this afternoon, was able to redirect. Attending groups and tending to ADL's.  Patient is compliant with all meds except her am protonix stating she already ate and it would not help. Safe environment maintained. Q15 minute checks for safety continued per unit policy. Will continue to monitor for safety and provide support and reassurance as needed.

## 2022-10-03 NOTE — BH NOTE
Writer contacted Dr Cuauhtemoc Bond via Mccoy Kanner was the physician who populated when attempting to contact Dr Izabella Hernandez- regarding patient's blood sugar prior to insulin which was 371.  Patient was covered with 4 units of insulin and the physician was attempted to be contacted, per order, with no response

## 2022-10-03 NOTE — PROGRESS NOTES
Daily Progress Note  10/2/2022    Patient Name: Iris Blackwell: Acute psychosis         SUBJECTIVE:      Patient is seen today for a follow up assessment. Patient has been compliant with scheduled medication at this time and has not required emergency medication in the past 24 hours. When approached for interview patient is endorsing \"a lot of depression\". Patient states she is feeling suicidal however denies having a plan. Patient is unable to contract for safety outside the hospital.  Nursing staff reports patient had an unwitnessed fall last night, she is denying any pain or discomfort at this time. When discussing auditory hallucinations patient reports hearing thoughts that God gave her and states that her mind is not how it is supposed to be. Patient reports distancing herself from certain thoughts and viewing them as external from herself. Discussion about integrating a sense of wholeness into her coping took place and patient was receptive. Patient denies visual hallucinations. Patient endorses paranoia related to cocaine use. Patient reports sobriety is a goal of hers. Patient does endorse some benefits from hospitalization however continues to blame others for unsafe behaviors on the unit with minimal insight. Patient was able to discuss her response when feeling stressed and recognizes that insults directed at peers and staff is not appropriate.         Appetite:  [x] Adequate/Unchanged  [] Increased  [] Decreased      Sleep:       [x] Adequate/Unchanged  [] Fair  [] Poor      Group Attendance on Unit:   [x] Yes   [] Selectively    [] No    Medication Side Effects: Denies          Mental Status Exam  Level of consciousness: Alert and awake   Appearance: Appropriate attire for setting, seated in wheelchair, with fair  grooming and hygiene   Behavior/Motor: Approachable, less agitated  Attitude toward examiner: cooperative, attentive, good eye contact, dismissive at times speech: Normal volume and rate  Mood: Patient reports \"fine\"   Affect: Congruent  Thought processes: Coherent, goal directed, illogical at times  Thought content: Denies homicidal ideation  Suicidal Ideation: Endorses suicidal ideation. Denies plan. Unable to contract for safety outside the hospital.  Delusions: No evidence of delusions. Perceptual Disturbance: Patient does not appear to be responding to internal stimuli. Cognition: Oriented to self, location, and general situation  Memory: Intact  Insight: poor   Judgement: poor       Data   height is 5' 5\" (1.651 m) and weight is 207 lb (93.9 kg). Her temporal temperature is 98.4 °F (36.9 °C). Her blood pressure is 130/78 and her pulse is 98. Her respiration is 14.    Labs:   Admission on 09/22/2022   Component Date Value Ref Range Status    POC Glucose 09/22/2022 135 (A)  65 - 105 mg/dL Final    POC Glucose 09/23/2022 168 (A)  65 - 105 mg/dL Final    WBC 09/23/2022 5.3  3.5 - 11.0 k/uL Final    RBC 09/23/2022 2.72 (A)  4.0 - 5.2 m/uL Final    Hemoglobin 09/23/2022 8.6 (A)  12.0 - 16.0 g/dL Final    Hematocrit 09/23/2022 26.8 (A)  36 - 46 % Final    MCV 09/23/2022 98.8  80 - 100 fL Final    MCH 09/23/2022 31.8  26 - 34 pg Final    MCHC 09/23/2022 32.2  31 - 37 g/dL Final    RDW 09/23/2022 14.9  11.5 - 14.9 % Final    Platelets 37/87/8459 434  150 - 450 k/uL Final    MPV 09/23/2022 7.8  6.0 - 12.0 fL Final    Glucose 09/23/2022 315 (A)  70 - 99 mg/dL Final    BUN 09/23/2022 13  6 - 20 mg/dL Final    Creatinine 09/23/2022 0.78  0.50 - 0.90 mg/dL Final    Calcium 09/23/2022 8.7  8.6 - 10.4 mg/dL Final    Sodium 09/23/2022 135  135 - 144 mmol/L Final    Potassium 09/23/2022 4.8  3.7 - 5.3 mmol/L Final    Chloride 09/23/2022 101  98 - 107 mmol/L Final    CO2 09/23/2022 24  20 - 31 mmol/L Final    Anion Gap 09/23/2022 10  9 - 17 mmol/L Final    Alkaline Phosphatase 09/23/2022 243 (A)  35 - 104 U/L Final    ALT 09/23/2022 10  5 - 33 U/L Final    AST 09/23/2022 8 <32 U/L Final    Total Bilirubin 09/23/2022 0.2 (A)  0.3 - 1.2 mg/dL Final    Total Protein 09/23/2022 6.8  6.4 - 8.3 g/dL Final    Albumin 09/23/2022 2.7 (A)  3.5 - 5.2 g/dL Final    GFR Non- 09/23/2022 >60  >60 mL/min Final    GFR  09/23/2022 >60  >60 mL/min Final    GFR Comment 09/23/2022        Final    Comment: Average GFR for 52-63 years old:   80 mL/min/1.73sq m  Chronic Kidney Disease:   <60 mL/min/1.73sq m  Kidney failure:   <15 mL/min/1.73sq m              eGFR calculated using average adult body mass. Additional eGFR calculator available at:        [email or web address]            POC Glucose 09/23/2022 222 (A)  65 - 105 mg/dL Final    POC Glucose 09/23/2022 286 (A)  65 - 105 mg/dL Final    POC Glucose 09/23/2022 237 (A)  65 - 105 mg/dL Final    POC Glucose 09/24/2022 185 (A)  65 - 105 mg/dL Final    POC Glucose 09/24/2022 177 (A)  65 - 105 mg/dL Final    POC Glucose 09/24/2022 241 (A)  65 - 105 mg/dL Final    POC Glucose 09/24/2022 221 (A)  65 - 105 mg/dL Final    POC Glucose 09/24/2022 248 (A)  65 - 105 mg/dL Final    Hepatitis C Ab 09/26/2022 NONREACTIVE  NONREACTIVE Final    Comment:       The hepatitis C procedure used in our laboratory is a Chemiluminescent test specific for   three recombinant HCV antigens. A negative anti-HCV result indicates that the antibodies to   hepatitis C virus are not present at this time. Individuals with reactive anti-HCV should be considered infected and infectious until proven   otherwise. Confirmation of all equivocal or reactive results is recommended by ordering   HCV RNA by PCR.       POC Glucose 09/25/2022 142 (A)  65 - 105 mg/dL Final    POC Glucose 09/25/2022 204 (A)  65 - 105 mg/dL Final    POC Glucose 09/25/2022 255 (A)  65 - 105 mg/dL Final    POC Glucose 09/25/2022 224 (A)  65 - 105 mg/dL Final    HIV Ag/Ab 09/26/2022 NONREACTIVE  NONREACTIVE Final    Comment: No laboratory evidence of HIV infection. If acute HIV infection is suspected, consider   testing for HIV-1 RNA.       POC Glucose 09/26/2022 140 (A)  65 - 105 mg/dL Final    POC Glucose 09/26/2022 248 (A)  65 - 105 mg/dL Final    POC Glucose 09/26/2022 279 (A)  65 - 105 mg/dL Final    POC Glucose 09/26/2022 331 (A)  65 - 105 mg/dL Final    POC Glucose 09/27/2022 196 (A)  65 - 105 mg/dL Final    POC Glucose 09/27/2022 227 (A)  65 - 105 mg/dL Final    POC Glucose 09/27/2022 311 (A)  65 - 105 mg/dL Final    POC Glucose 09/27/2022 295 (A)  65 - 105 mg/dL Final    POC Glucose 09/27/2022 301 (A)  65 - 105 mg/dL Final    POC Glucose 09/28/2022 267 (A)  65 - 105 mg/dL Final    POC Glucose 09/28/2022 334 (A)  65 - 105 mg/dL Final    POC Glucose 09/28/2022 270 (A)  65 - 105 mg/dL Final    POC Glucose 09/28/2022 248 (A)  65 - 105 mg/dL Final    POC Glucose 09/28/2022 251 (A)  65 - 105 mg/dL Final    POC Glucose 09/28/2022 235 (A)  65 - 105 mg/dL Final    Critical Noted    POC Glucose 09/28/2022 323 (A)  65 - 105 mg/dL Final    POC Glucose 09/28/2022 475 (A)  65 - 105 mg/dL Final    Critical Noted    POC Glucose 09/28/2022 452 (A)  65 - 105 mg/dL Final    Critical Noted    POC Glucose 09/28/2022 423 (A)  65 - 105 mg/dL Final    Critical Noted    Glucose 09/29/2022 299 (A)  70 - 99 mg/dL Final    BUN 09/29/2022 14  6 - 20 mg/dL Final    Creatinine 09/29/2022 1.05 (A)  0.50 - 0.90 mg/dL Final    Calcium 09/29/2022 8.4 (A)  8.6 - 10.4 mg/dL Final    Sodium 09/29/2022 138  135 - 144 mmol/L Final    Potassium 09/29/2022 5.0  3.7 - 5.3 mmol/L Final    Chloride 09/29/2022 103  98 - 107 mmol/L Final    CO2 09/29/2022 26  20 - 31 mmol/L Final    Anion Gap 09/29/2022 9  9 - 17 mmol/L Final    GFR Non- 09/29/2022 54 (A)  >60 mL/min Final    GFR  09/29/2022 >60  >60 mL/min Final    GFR Comment 09/29/2022        Final    Comment: Average GFR for 52-63 years old:   80 mL/min/1.73sq m  Chronic Kidney Disease:   <60 mL/min/1.73sq m  Kidney failure:   <15 mL/min/1.73sq m              eGFR calculated using average adult body mass. Additional eGFR calculator available at:        [employer]            POC Glucose 09/29/2022 153 (A)  65 - 105 mg/dL Final    POC Glucose 09/29/2022 244 (A)  65 - 105 mg/dL Final    POC Glucose 09/29/2022 172 (A)  65 - 105 mg/dL Final    POC Glucose 09/30/2022 162 (A)  65 - 105 mg/dL Final    POC Glucose 09/29/2022 250 (A)  65 - 105 mg/dL Final    POC Glucose 09/30/2022 262 (A)  65 - 105 mg/dL Final    POC Glucose 09/30/2022 199 (A)  65 - 105 mg/dL Final    POC Glucose 09/30/2022 239 (A)  65 - 105 mg/dL Final    POC Glucose 10/01/2022 122 (A)  65 - 105 mg/dL Final    POC Glucose 10/01/2022 179 (A)  65 - 105 mg/dL Final    POC Glucose 10/01/2022 167 (A)  65 - 105 mg/dL Final    POC Glucose 10/01/2022 185 (A)  65 - 105 mg/dL Final    POC Glucose 10/02/2022 166 (A)  65 - 105 mg/dL Final    POC Glucose 10/02/2022 271 (A)  65 - 105 mg/dL Final    POC Glucose 10/02/2022 324 (A)  65 - 105 mg/dL Final    POC Glucose 10/02/2022 315 (A)  65 - 105 mg/dL Final         Reviewed patient's current plan of care and vital signs with nursing staff.     Labs reviewed: [x] Yes    Medications  Current Facility-Administered Medications: albuterol sulfate HFA (PROVENTIL;VENTOLIN;PROAIR) 108 (90 Base) MCG/ACT inhaler 2 puff, 2 puff, Inhalation, Q6H PRN  albuterol (PROVENTIL) nebulizer solution 2.5 mg, 2.5 mg, Nebulization, Q6H PRN  rivastigmine (EXELON) 4.6 MG/24HR 1 patch, 1 patch, TransDERmal, Daily  lidocaine 4 % external patch 1 patch, 1 patch, TransDERmal, Daily  insulin lispro (HUMALOG) injection vial 0-4 Units, 0-4 Units, SubCUTAneous, TID WC  insulin lispro (HUMALOG) injection vial 0-4 Units, 0-4 Units, SubCUTAneous, Nightly  [Held by provider] metFORMIN (GLUCOPHAGE) tablet 1,000 mg, 1,000 mg, Oral, BID WC  insulin glargine (LANTUS) injection vial 17 Units, 17 Units, SubCUTAneous, BID  atorvastatin (LIPITOR) tablet 40 mg, 40 mg, Oral, Nightly  glucagon (rDNA) injection 1 mg, 1 mg, SubCUTAneous, PRN  glucose chewable tablet 16 g, 4 tablet, Oral, PRN  OLANZapine zydis (ZYPREXA) disintegrating tablet 15 mg, 15 mg, Oral, Nightly  pantoprazole (PROTONIX) tablet 40 mg, 40 mg, Oral, BID  pregabalin (LYRICA) capsule 75 mg, 75 mg, Oral, BID  acetaminophen (TYLENOL) tablet 650 mg, 650 mg, Oral, Q6H PRN  ibuprofen (ADVIL;MOTRIN) tablet 400 mg, 400 mg, Oral, Q6H PRN  hydrOXYzine HCl (ATARAX) tablet 50 mg, 50 mg, Oral, TID PRN  traZODone (DESYREL) tablet 50 mg, 50 mg, Oral, Nightly PRN  polyethylene glycol (GLYCOLAX) packet 17 g, 17 g, Oral, Daily PRN  aluminum & magnesium hydroxide-simethicone (MAALOX) 200-200-20 MG/5ML suspension 30 mL, 30 mL, Oral, Q6H PRN  nicotine polacrilex (NICORETTE) gum 2 mg, 2 mg, Oral, Q2H PRN  haloperidol lactate (HALDOL) injection 5 mg, 5 mg, IntraMUSCular, Q6H PRN **AND** LORazepam (ATIVAN) injection 2 mg, 2 mg, IntraMUSCular, Q6H PRN **AND** diphenhydrAMINE (BENADRYL) injection 50 mg, 50 mg, IntraMUSCular, Q6H PRN    ASSESSMENT  Unspecified psychosis not due to a substance or known physiological condition ([facility]         PLAN  Patient symptoms: Remain unstable  Continue current medication regimen. Monitor need and frequency of PRN medications. Encourage participation in groups and milieu. Attempt to develop insight. Psycho-education conducted. Supportive Therapy conducted. Probable discharge is per attending physician. Follow-up daily while inpatient. Patient continues to be monitored in the inpatient psychiatric facility at [facility] for safety and stabilization. Patient continues to need, on a daily basis, active treatment furnished directly by or requiring the supervision of inpatient psychiatric personnel. Electronically signed by [address] [place] [address],[address], APRN - CNP on 10/2/2022 at 10:06 PM    **This report has been created using voice recognition software.  It may contain minor errors which are inherent in voice recognition technology. **

## 2022-10-03 NOTE — PROGRESS NOTES
Daily Progress Note  10/3/2022    Patient Name: Daniel Alfredo: Acute psychosis         SUBJECTIVE:      Patient is seen today for a follow up assessment. Patient has been compliant with scheduled medication at this time and has not required emergency medication in the past 24 hours. Blood pressure interview patient states that she is feeling better however was crying earlier related to thoughts about deaths in her family. Patient states that her depression is a 5 out of 10 on a 1-10 scale (1 being low and 10 being high). Patient reports improvement in suicidal ideation and having plan of how to end her life. Patient is currently presenting as pleasant and cooperative. Patient was discussing discharge plans to find her own independent housing and states she is a financial means to do so. Patient reports thoughts about coping with previously discussed splitting of thoughts something separate from her mind. Patient reports she has been incorporating coping skills discussed with staff to integrate her thoughts and mind into a whole sense of herself. Patient is denying medication side effects. Appetite:  [x] Adequate/Unchanged  [] Increased  [] Decreased      Sleep:       [x] Adequate/Unchanged  [] Fair  [] Poor      Group Attendance on Unit:   [x] Yes   [] Selectively    [] No    Medication Side Effects: Denies          Mental Status Exam  Level of consciousness: Alert and awake   Appearance: Appropriate attire for setting, seated in wheelchair, with fair  grooming and hygiene   Behavior/Motor: Approachable, pleasant  Attitude toward examiner: cooperative, attentive, good eye contact  speech: Normal volume and rate  Mood: Patient reports \"good\"   Affect: Congruent  Thought processes: Coherent, goal directed  Thought content: Denies homicidal ideation  Suicidal Ideation: Reports improvement in suicidal ideation. Denies plan.   Unable to contract for safety outside the [facility].  Delusions: No evidence of delusions. Perceptual Disturbance: Patient does not appear to be responding to internal stimuli. Cognition: Oriented to self, location, and general situation  Memory: Intact  Insight: Fair  Judgement: Fair      Data   height is 5' 5\" (1.651 m) and weight is 207 lb (93.9 kg). Her oral temperature is 98.4 °F (36.9 °C). Her blood pressure is 112/67 and her pulse is 96. Her respiration is 16.    Labs:   Admission on 09/22/2022   Component Date Value Ref Range Status    POC Glucose 09/22/2022 135 (A)  65 - 105 mg/dL Final    POC Glucose 09/23/2022 168 (A)  65 - 105 mg/dL Final    WBC 09/23/2022 5.3  3.5 - 11.0 k/uL Final    RBC 09/23/2022 2.72 (A)  4.0 - 5.2 m/uL Final    Hemoglobin 09/23/2022 8.6 (A)  12.0 - 16.0 g/dL Final    Hematocrit 09/23/2022 26.8 (A)  36 - 46 % Final    MCV 09/23/2022 98.8  80 - 100 fL Final    MCH 09/23/2022 31.8  26 - 34 pg Final    MCHC 09/23/2022 32.2  31 - 37 g/dL Final    RDW 09/23/2022 14.9  11.5 - 14.9 % Final    Platelets 65/98/0963 434  150 - 450 k/uL Final    MPV 09/23/2022 7.8  6.0 - 12.0 fL Final    Glucose 09/23/2022 315 (A)  70 - 99 mg/dL Final    BUN 09/23/2022 13  6 - 20 mg/dL Final    Creatinine 09/23/2022 0.78  0.50 - 0.90 mg/dL Final    Calcium 09/23/2022 8.7  8.6 - 10.4 mg/dL Final    Sodium 09/23/2022 135  135 - 144 mmol/L Final    Potassium 09/23/2022 4.8  3.7 - 5.3 mmol/L Final    Chloride 09/23/2022 101  98 - 107 mmol/L Final    CO2 09/23/2022 24  20 - 31 mmol/L Final    Anion Gap 09/23/2022 10  9 - 17 mmol/L Final    Alkaline Phosphatase 09/23/2022 243 (A)  35 - 104 U/L Final    ALT 09/23/2022 10  5 - 33 U/L Final    AST 09/23/2022 8  <32 U/L Final    Total Bilirubin 09/23/2022 0.2 (A)  0.3 - 1.2 mg/dL Final    Total Protein 09/23/2022 6.8  6.4 - 8.3 g/dL Final    Albumin 09/23/2022 2.7 (A)  3.5 - 5.2 g/dL Final    GFR Non- 09/23/2022 >60  >60 mL/min Final    GFR  09/23/2022 >60  >60 mL/min Final GFR Comment 09/23/2022        Final    Comment: Average GFR for 52-63 years old:   80 mL/min/1.73sq m  Chronic Kidney Disease:   <60 mL/min/1.73sq m  Kidney failure:   <15 mL/min/1.73sq m              eGFR calculated using average adult body mass. Additional eGFR calculator available at:        lucierna.br            POC Glucose 09/23/2022 222 (A)  65 - 105 mg/dL Final    POC Glucose 09/23/2022 286 (A)  65 - 105 mg/dL Final    POC Glucose 09/23/2022 237 (A)  65 - 105 mg/dL Final    POC Glucose 09/24/2022 185 (A)  65 - 105 mg/dL Final    POC Glucose 09/24/2022 177 (A)  65 - 105 mg/dL Final    POC Glucose 09/24/2022 241 (A)  65 - 105 mg/dL Final    POC Glucose 09/24/2022 221 (A)  65 - 105 mg/dL Final    POC Glucose 09/24/2022 248 (A)  65 - 105 mg/dL Final    Hepatitis C Ab 09/26/2022 NONREACTIVE  NONREACTIVE Final    Comment:       The hepatitis C procedure used in our laboratory is a Chemiluminescent test specific for   three recombinant HCV antigens. A negative anti-HCV result indicates that the antibodies to   hepatitis C virus are not present at this time. Individuals with reactive anti-HCV should be considered infected and infectious until proven   otherwise. Confirmation of all equivocal or reactive results is recommended by ordering   HCV RNA by PCR. POC Glucose 09/25/2022 142 (A)  65 - 105 mg/dL Final    POC Glucose 09/25/2022 204 (A)  65 - 105 mg/dL Final    POC Glucose 09/25/2022 255 (A)  65 - 105 mg/dL Final    POC Glucose 09/25/2022 224 (A)  65 - 105 mg/dL Final    HIV Ag/Ab 09/26/2022 NONREACTIVE  NONREACTIVE Final    Comment: No laboratory evidence of HIV infection. If acute HIV infection is suspected, consider   testing for HIV-1 RNA.       POC Glucose 09/26/2022 140 (A)  65 - 105 mg/dL Final    POC Glucose 09/26/2022 248 (A)  65 - 105 mg/dL Final    POC Glucose 09/26/2022 279 (A)  65 - 105 mg/dL Final    POC Glucose 09/26/2022 331 (A)  65 - 105 mg/dL Final POC Glucose 09/27/2022 196 (A)  65 - 105 mg/dL Final    POC Glucose 09/27/2022 227 (A)  65 - 105 mg/dL Final    POC Glucose 09/27/2022 311 (A)  65 - 105 mg/dL Final    POC Glucose 09/27/2022 295 (A)  65 - 105 mg/dL Final    POC Glucose 09/27/2022 301 (A)  65 - 105 mg/dL Final    POC Glucose 09/28/2022 267 (A)  65 - 105 mg/dL Final    POC Glucose 09/28/2022 334 (A)  65 - 105 mg/dL Final    POC Glucose 09/28/2022 270 (A)  65 - 105 mg/dL Final    POC Glucose 09/28/2022 248 (A)  65 - 105 mg/dL Final    POC Glucose 09/28/2022 251 (A)  65 - 105 mg/dL Final    POC Glucose 09/28/2022 235 (A)  65 - 105 mg/dL Final    Critical Noted    POC Glucose 09/28/2022 323 (A)  65 - 105 mg/dL Final    POC Glucose 09/28/2022 475 (A)  65 - 105 mg/dL Final    Critical Noted    POC Glucose 09/28/2022 452 (A)  65 - 105 mg/dL Final    Critical Noted    POC Glucose 09/28/2022 423 (A)  65 - 105 mg/dL Final    Critical Noted    Glucose 09/29/2022 299 (A)  70 - 99 mg/dL Final    BUN 09/29/2022 14  6 - 20 mg/dL Final    Creatinine 09/29/2022 1.05 (A)  0.50 - 0.90 mg/dL Final    Calcium 09/29/2022 8.4 (A)  8.6 - 10.4 mg/dL Final    Sodium 09/29/2022 138  135 - 144 mmol/L Final    Potassium 09/29/2022 5.0  3.7 - 5.3 mmol/L Final    Chloride 09/29/2022 103  98 - 107 mmol/L Final    CO2 09/29/2022 26  20 - 31 mmol/L Final    Anion Gap 09/29/2022 9  9 - 17 mmol/L Final    GFR Non- 09/29/2022 54 (A)  >60 mL/min Final    GFR  09/29/2022 >60  >60 mL/min Final    GFR Comment 09/29/2022        Final    Comment: Average GFR for 52-63 years old:   80 mL/min/1.73sq m  Chronic Kidney Disease:   <60 mL/min/1.73sq m  Kidney failure:   <15 mL/min/1.73sq m              eGFR calculated using average adult body mass.  Additional eGFR calculator available at:        [employer]            POC Glucose 09/29/2022 153 (A)  65 - 105 mg/dL Final    POC Glucose 09/29/2022 244 (A)  65 - 105 mg/dL Final    POC Glucose 09/29/2022 172 (A)  65 - 105 mg/dL Final    POC Glucose 09/30/2022 162 (A)  65 - 105 mg/dL Final    POC Glucose 09/29/2022 250 (A)  65 - 105 mg/dL Final    POC Glucose 09/30/2022 262 (A)  65 - 105 mg/dL Final    POC Glucose 09/30/2022 199 (A)  65 - 105 mg/dL Final    POC Glucose 09/30/2022 239 (A)  65 - 105 mg/dL Final    POC Glucose 10/01/2022 122 (A)  65 - 105 mg/dL Final    POC Glucose 10/01/2022 179 (A)  65 - 105 mg/dL Final    POC Glucose 10/01/2022 167 (A)  65 - 105 mg/dL Final    POC Glucose 10/01/2022 185 (A)  65 - 105 mg/dL Final    POC Glucose 10/02/2022 166 (A)  65 - 105 mg/dL Final    POC Glucose 10/02/2022 271 (A)  65 - 105 mg/dL Final    POC Glucose 10/02/2022 324 (A)  65 - 105 mg/dL Final    POC Glucose 10/02/2022 315 (A)  65 - 105 mg/dL Final    POC Glucose 10/03/2022 232 (A)  65 - 105 mg/dL Final    POC Glucose 10/03/2022 407 (A)  65 - 105 mg/dL Final    Critical Noted         Reviewed patient's current plan of care and vital signs with nursing staff.     Labs reviewed: [x] Yes    Medications  Current Facility-Administered Medications: albuterol sulfate HFA (PROVENTIL;VENTOLIN;PROAIR) 108 (90 Base) MCG/ACT inhaler 2 puff, 2 puff, Inhalation, Q6H PRN  albuterol (PROVENTIL) nebulizer solution 2.5 mg, 2.5 mg, Nebulization, Q6H PRN  rivastigmine (EXELON) 4.6 MG/24HR 1 patch, 1 patch, TransDERmal, Daily  lidocaine 4 % external patch 1 patch, 1 patch, TransDERmal, Daily  insulin lispro (HUMALOG) injection vial 0-4 Units, 0-4 Units, SubCUTAneous, TID WC  insulin lispro (HUMALOG) injection vial 0-4 Units, 0-4 Units, SubCUTAneous, Nightly  [Held by provider] metFORMIN (GLUCOPHAGE) tablet 1,000 mg, 1,000 mg, Oral, BID WC  insulin glargine (LANTUS) injection vial 17 Units, 17 Units, SubCUTAneous, BID  atorvastatin (LIPITOR) tablet 40 mg, 40 mg, Oral, Nightly  glucagon (rDNA) injection 1 mg, 1 mg, SubCUTAneous, PRN  glucose chewable tablet 16 g, 4 tablet, Oral, PRN  OLANZapine zydis (ZYPREXA) disintegrating tablet 15 mg, 15 mg, Oral, Nightly  pantoprazole (PROTONIX) tablet 40 mg, 40 mg, Oral, BID  pregabalin (LYRICA) capsule 75 mg, 75 mg, Oral, BID  acetaminophen (TYLENOL) tablet 650 mg, 650 mg, Oral, Q6H PRN  ibuprofen (ADVIL;MOTRIN) tablet 400 mg, 400 mg, Oral, Q6H PRN  hydrOXYzine HCl (ATARAX) tablet 50 mg, 50 mg, Oral, TID PRN  traZODone (DESYREL) tablet 50 mg, 50 mg, Oral, Nightly PRN  polyethylene glycol (GLYCOLAX) packet 17 g, 17 g, Oral, Daily PRN  aluminum & magnesium hydroxide-simethicone (MAALOX) 200-200-20 MG/5ML suspension 30 mL, 30 mL, Oral, Q6H PRN  nicotine polacrilex (NICORETTE) gum 2 mg, 2 mg, Oral, Q2H PRN  haloperidol lactate (HALDOL) injection 5 mg, 5 mg, IntraMUSCular, Q6H PRN **AND** LORazepam (ATIVAN) injection 2 mg, 2 mg, IntraMUSCular, Q6H PRN **AND** diphenhydrAMINE (BENADRYL) injection 50 mg, 50 mg, IntraMUSCular, Q6H PRN    ASSESSMENT  Unspecified psychosis not due to a substance or known physiological condition [facility])         HANDOFF  Patient symptoms: Showing modest improvement  Consultation with attending physician for medication  Encourage participation in groups and milieu. Probable discharge is to be determined by MD    Electronically signed by LAILA Saldivar CNP on 10/3/2022 at 4:13 PM    **This report has been created using voice recognition software. It may contain minor errors which are inherent in voice recognition technology. **    I independently saw and evaluated the patient. I reviewed the nurse practioner's documentation above. Any additional comments or changes to the    documentation are stated below otherwise agree with assessment. The patient is pleasant on approach. She continues to be discharge focused. She has been compliant with medication and reports no side effects      PLAN  Medications as noted above  Attempt to develop insight  Expected Length of Stay is 3-9 days. Psycho-education conducted.   Supportive Therapy conducted.   Follow-up daily while on inpatient unit    Electronically signed by Shimon Lagunas MD on 10/3/22 at 5:57 PM EDT

## 2022-10-03 NOTE — BH NOTE
Attempted to contact multiple Internal Med providers related to patient's elevated blood sugar, all perfect serves lead to Dr. Adonis Dove, who has not read messages. Charge nurse notified and instructed to contact Enrico Schwab, NP since she is the one on call this evening. Writer was on the phone with Liliya Franks and was disconnected, writer called back and Liliya Franks was unavailable. Information passed on to oncoming nurse.

## 2022-10-03 NOTE — PROGRESS NOTES
Physical Therapy        Physical Therapy Cancel Note      DATE: 10/3/2022    NAME: Dixie Crawford  MRN: [id number]   : 1965      Patient not seen this date for Physical Therapy due to:  10/3/22 Writer calls to [facility]; Nurse Anatoliy Patel reporting patient has been highly agitated all day and defers PT assessments d/t behavior & agitation this date as pt is not behaving appropriately for formal assessments. PT to follow.       Electronically signed by Sumanth Rogers PT on 10/3/2022 at 1:46 PM

## 2022-10-03 NOTE — PLAN OF CARE
Problem: Self Harm/Suicidality  Goal: Will have no self-injury during hospital stay  Description: INTERVENTIONS:  1. Q 30 MINUTES: Routine safety checks  2. Q SHIFT & PRN: Assess risk to determine if routine checks are adequate to maintain patient safety  10/2/2022 2347 by Ana Herrera RN  Outcome: Progressing  Note: Pt denies any suicidal or homicidal ideations at this time. Pt agreed to seek staff and report any intrusive thoughts of hurting self or others. Regular rounding 4 times an hour and spontaneous patient checks done for safety and per unit policy. Pt remains free from any self-harm, safe environment maintained. Will continue to provide emotional support and reassurance as needed. Problem: Anxiety  Goal: Will report anxiety at manageable levels  Description: INTERVENTIONS:  1. Administer medication as ordered  2. Teach and rehearse alternative coping skills  3. Provide emotional support with 1:1 interaction with staff  Outcome: Progressing  Flowsheets (Taken 10/2/2022 2343)  Will report anxiety at manageable levels:   Administer medication as ordered   Provide emotional support with 1:1 interaction with staff   Teach and rehearse alternative coping skills  Note: Pt observed to have increased anxiety and agitation, but redirectable. Pt given PRN medication for increased anxiety as ordered. Problem: Confusion  Goal: Confusion, delirium, dementia, or psychosis is improved or at baseline  Description: INTERVENTIONS:  1. Assess for possible contributors to thought disturbance, including medications, impaired vision or hearing, underlying metabolic abnormalities, dehydration, psychiatric diagnoses, and notify attending LIP  2. [place] high risk fall precautions, as indicated  3. Provide frequent short contacts to provide reality reorientation, refocusing and direction  4. Decrease environmental stimuli, including noise as appropriate  5.  Monitor and intervene to maintain adequate nutrition, hydration, elimination, sleep and activity  6. If unable to ensure safety without constant attention obtain sitter and review sitter guidelines with assigned personnel  7. Initiate Psychosocial CNS and Spiritual Care consult, as indicated  Outcome: Progressing  Flowsheets (Taken 10/2/2022 2345)  Effect of thought disturbance (confusion, delirium, dementia, or psychosis) are managed with adequate functional status:   Assess for contributors to thought disturbance, including medications, impaired vision or hearing, underlying metabolic abnormalities, dehydration, psychiatric diagnoses, notify [place] high risk fall precautions, as indicated   Provide frequent short contacts to provide reality reorientation, refocusing and direction   Decrease environmental stimuli, including noise as appropriate   Monitor and intervene to maintain adequate nutrition, hydration, elimination, sleep and activity     Problem: Safety - Adult  Goal: Free from fall injury  Outcome: Progressing  Flowsheets (Taken 10/2/2022 2347)  Free From Fall Injury:   Instruct family/caregiver on patient safety   Based on caregiver fall risk screen, instruct family/caregiver to ask for assistance with transferring infant if caregiver noted to have fall risk factors  Note: No falls reported or observed this shift. Fall precaution continued and maintained. Pt wearing non skid footwear and encouraged to seek out staff for any assistance needed.         Problem: Chronic Conditions and Co-morbidities  Goal: Patient's chronic conditions and co-morbidity symptoms are monitored and maintained or improved  Outcome: Progressing  Flowsheets (Taken 10/2/2022 2343)  Care Plan - Patient's Chronic Conditions and Co-Morbidity Symptoms are Monitored and Maintained or Improved: Monitor and assess patient's chronic conditions and comorbid symptoms for stability, deterioration, or improvement     Problem: Pain  Goal: Verbalizes/displays adequate comfort level or baseline comfort level  Outcome: Progressing  Flowsheets (Taken 10/2/2022 1226)  Verbalizes/displays adequate comfort level or baseline comfort level:   Encourage patient to monitor pain and request assistance   Assess pain using appropriate pain scale   Administer analgesics based on type and severity of pain and evaluate response   Implement non-pharmacological measures as appropriate and evaluate response   Consider cultural and social influences on pain and pain management  Note: Pt complains of generalized pain rates at 8/10. Staff educated pt of non pharmacological pain intervention such as distraction, relaxation, and rest, but no relief. Pt requested PRN analgesic, states pain medication helps decrease pain level.

## 2022-10-04 LAB
ANION GAP SERPL CALCULATED.3IONS-SCNC: 9 MMOL/L (ref 9–17)
BUN BLDV-MCNC: 13 MG/DL (ref 6–20)
CALCIUM SERPL-MCNC: 8.8 MG/DL (ref 8.6–10.4)
CHLORIDE BLD-SCNC: 98 MMOL/L (ref 98–107)
CO2: 26 MMOL/L (ref 20–31)
CREAT SERPL-MCNC: 1.14 MG/DL (ref 0.5–0.9)
GFR SERPL CREATININE-BSD FRML MDRD: 57 ML/MIN/1.73M2
GLUCOSE BLD-MCNC: 212 MG/DL (ref 65–105)
GLUCOSE BLD-MCNC: 332 MG/DL (ref 65–105)
GLUCOSE BLD-MCNC: 337 MG/DL (ref 65–105)
GLUCOSE BLD-MCNC: 388 MG/DL (ref 65–105)
GLUCOSE BLD-MCNC: 505 MG/DL (ref 70–99)
POTASSIUM SERPL-SCNC: 5.6 MMOL/L (ref 3.7–5.3)
SODIUM BLD-SCNC: 133 MMOL/L (ref 135–144)

## 2022-10-04 PROCEDURE — 80048 BASIC METABOLIC PNL TOTAL CA: CPT

## 2022-10-04 PROCEDURE — 99232 SBSQ HOSP IP/OBS MODERATE 35: CPT | Performed by: PSYCHIATRY & NEUROLOGY

## 2022-10-04 PROCEDURE — 82947 ASSAY GLUCOSE BLOOD QUANT: CPT

## 2022-10-04 PROCEDURE — 99232 SBSQ HOSP IP/OBS MODERATE 35: CPT | Performed by: INTERNAL MEDICINE

## 2022-10-04 PROCEDURE — 36415 COLL VENOUS BLD VENIPUNCTURE: CPT

## 2022-10-04 PROCEDURE — 6370000000 HC RX 637 (ALT 250 FOR IP): Performed by: PSYCHIATRY & NEUROLOGY

## 2022-10-04 PROCEDURE — APPSS30 APP SPLIT SHARED TIME 16-30 MINUTES

## 2022-10-04 PROCEDURE — 6370000000 HC RX 637 (ALT 250 FOR IP): Performed by: INTERNAL MEDICINE

## 2022-10-04 PROCEDURE — 6370000000 HC RX 637 (ALT 250 FOR IP): Performed by: NURSE PRACTITIONER

## 2022-10-04 PROCEDURE — 1240000000 HC EMOTIONAL WELLNESS R&B

## 2022-10-04 RX ORDER — INSULIN GLARGINE 100 [IU]/ML
30 INJECTION, SOLUTION SUBCUTANEOUS 2 TIMES DAILY
Status: DISCONTINUED | OUTPATIENT
Start: 2022-10-04 | End: 2022-10-06

## 2022-10-04 RX ORDER — ALBUTEROL SULFATE 90 UG/1
2 AEROSOL, METERED RESPIRATORY (INHALATION) EVERY 4 HOURS PRN
Status: DISCONTINUED | OUTPATIENT
Start: 2022-10-04 | End: 2022-10-13 | Stop reason: HOSPADM

## 2022-10-04 RX ADMIN — INSULIN LISPRO 1 UNITS: 100 INJECTION, SOLUTION INTRAVENOUS; SUBCUTANEOUS at 07:59

## 2022-10-04 RX ADMIN — METFORMIN HYDROCHLORIDE 500 MG: 500 TABLET ORAL at 16:36

## 2022-10-04 RX ADMIN — ALBUTEROL SULFATE 2 PUFF: 90 AEROSOL, METERED RESPIRATORY (INHALATION) at 21:42

## 2022-10-04 RX ADMIN — ACETAMINOPHEN 650 MG: 325 TABLET, FILM COATED ORAL at 16:36

## 2022-10-04 RX ADMIN — PANTOPRAZOLE SODIUM 40 MG: 40 TABLET, DELAYED RELEASE ORAL at 20:56

## 2022-10-04 RX ADMIN — HYDROXYZINE HYDROCHLORIDE 50 MG: 50 TABLET ORAL at 20:56

## 2022-10-04 RX ADMIN — INSULIN GLARGINE 30 UNITS: 100 INJECTION, SOLUTION SUBCUTANEOUS at 20:54

## 2022-10-04 RX ADMIN — IBUPROFEN 400 MG: 400 TABLET ORAL at 21:41

## 2022-10-04 RX ADMIN — INSULIN LISPRO 3 UNITS: 100 INJECTION, SOLUTION INTRAVENOUS; SUBCUTANEOUS at 16:41

## 2022-10-04 RX ADMIN — PREGABALIN 75 MG: 75 CAPSULE ORAL at 07:58

## 2022-10-04 RX ADMIN — TRAZODONE HYDROCHLORIDE 50 MG: 50 TABLET ORAL at 20:56

## 2022-10-04 RX ADMIN — INSULIN GLARGINE 17 UNITS: 100 INJECTION, SOLUTION SUBCUTANEOUS at 08:00

## 2022-10-04 RX ADMIN — OLANZAPINE 15 MG: 10 TABLET, ORALLY DISINTEGRATING ORAL at 20:56

## 2022-10-04 RX ADMIN — ATORVASTATIN CALCIUM 40 MG: 20 TABLET, FILM COATED ORAL at 20:56

## 2022-10-04 RX ADMIN — PANTOPRAZOLE SODIUM 40 MG: 40 TABLET, DELAYED RELEASE ORAL at 07:58

## 2022-10-04 RX ADMIN — HYDROXYZINE HYDROCHLORIDE 50 MG: 50 TABLET ORAL at 07:58

## 2022-10-04 RX ADMIN — PREGABALIN 75 MG: 75 CAPSULE ORAL at 20:56

## 2022-10-04 RX ADMIN — INSULIN LISPRO 4 UNITS: 100 INJECTION, SOLUTION INTRAVENOUS; SUBCUTANEOUS at 12:43

## 2022-10-04 RX ADMIN — INSULIN LISPRO 4 UNITS: 100 INJECTION, SOLUTION INTRAVENOUS; SUBCUTANEOUS at 20:55

## 2022-10-04 ASSESSMENT — PAIN DESCRIPTION - ORIENTATION: ORIENTATION: LEFT;RIGHT

## 2022-10-04 ASSESSMENT — PAIN SCALES - GENERAL: PAINLEVEL_OUTOF10: 3

## 2022-10-04 ASSESSMENT — PAIN - FUNCTIONAL ASSESSMENT: PAIN_FUNCTIONAL_ASSESSMENT: PREVENTS OR INTERFERES SOME ACTIVE ACTIVITIES AND ADLS

## 2022-10-04 ASSESSMENT — PAIN DESCRIPTION - LOCATION: LOCATION: LEG

## 2022-10-04 ASSESSMENT — PAIN DESCRIPTION - DESCRIPTORS: DESCRIPTORS: ACHING

## 2022-10-04 NOTE — GROUP NOTE
Group Therapy Note    Date: 10/4/2022    Group Start Time: 1100  Group End Time: 7307  Group Topic: Psychoeducation    [facility]    CHRISTINA De La Cruz        Group Therapy Note    Attendees: 9/20       Patient's Goal:  to improve communication skills /improve coping skills     Notes:   pt was pleasant and participated well     Status After Intervention:  Improved    Participation Level:  Active Listener and Interactive    Participation Quality: Appropriate, Attentive, Sharing, and Supportive      Speech:  normal      Thought Process/Content: thoughts are loose at times       Affective Functioning: Congruent      Mood: euthymic      Level of consciousness:  Alert      Response to Learning: Able to verbalize current knowledge/experience and Progressing to goal      Endings: None Reported    Modes of Intervention: Education, Support, Socialization, and Clarifying      Discipline Responsible: Psychoeducational Specialist      Signature:  Jaci Bernal

## 2022-10-04 NOTE — PLAN OF CARE
Problem: Chronic Conditions and Co-morbidities  Goal: Patient's chronic conditions and co-morbidity symptoms are monitored and maintained or improved  Outcome: Progressing     Problem: Confusion  Goal: Confusion, delirium, dementia, or psychosis is improved or at baseline  Description: INTERVENTIONS:  1. Assess for possible contributors to thought disturbance, including medications, impaired vision or hearing, underlying metabolic abnormalities, dehydration, psychiatric diagnoses, and notify attending LIP  2. [place] high risk fall precautions, as indicated  3. Provide frequent short contacts to provide reality reorientation, refocusing and direction  4. Decrease environmental stimuli, including noise as appropriate  5. Monitor and intervene to maintain adequate nutrition, hydration, elimination, sleep and activity  6. If unable to ensure safety without constant attention obtain sitter and review sitter guidelines with assigned personnel  7. Initiate Psychosocial CNS and Spiritual Care consult, as indicated  Outcome: Progressing  Flowsheets (Taken 10/2/2022 2345 by Deandre Suárez RN)  Effect of thought disturbance (confusion, delirium, dementia, or psychosis) are managed with adequate functional status:   Assess for contributors to thought disturbance, including medications, impaired vision or hearing, underlying metabolic abnormalities, dehydration, psychiatric diagnoses, notify [facility] high risk fall precautions, as indicated   Provide frequent short contacts to provide reality reorientation, refocusing and direction   Decrease environmental stimuli, including noise as appropriate   Monitor and intervene to maintain adequate nutrition, hydration, elimination, sleep and activity  Note: No confusion noted this shift. Patient is able to answer questions appropriately. Patient appears to be at baseline.       Problem: Safety - Adult  Goal: Free from fall injury  Outcome: Progressing  Flowsheets (Taken 10/2/2022 2347 by Herman Olivera RN)  Free From Fall Injury:   Instruct family/caregiver on patient safety   Based on caregiver fall risk screen, instruct family/caregiver to ask for assistance with transferring infant if caregiver noted to have fall risk factors  Note:   Patient remains free of falls and verbalizes understanding of individual fall risks. Patient uses wheelchair when up and transfers per self. Patient uses call light when assistance is needed. Patient wearing non skid footwear and encouraged to seek out staff for any assistance needed. Problem: Self Harm/Suicidality  Goal: Will have no self-injury during hospital stay  Description: INTERVENTIONS:  1. Q 30 MINUTES: Routine safety checks  2. Q SHIFT & PRN: Assess risk to determine if routine checks are adequate to maintain patient safety  Outcome: Progressing  Note:    Patient denies thoughts of suicide or self-harm and agrees to seek out staff should negative thoughts arise. Patient denies hallucinations. Patient able to remain in behavioral control this shift. Attending groups and tending to ADL's. Patient is compliant with meds. Safe environment maintained. Q15 minute checks for safety continued per unit policy. Will continue to monitor for safety and provide support and reassurance as needed.

## 2022-10-04 NOTE — PROGRESS NOTES
Physical Therapy          Physical Therapy Cancel Note      DATE: 10/4/2022    NAME: Yojana Zheng  MRN: [id number]   : 1965      Patient not seen this date for Physical Therapy due to:    Pt was seen sitting in common area in a wheelchair watching television and upon being approached for PT evaluation she deferred the evaluation and wanted the writer to return after an hour. Her nurse was informed of her refusal and  will check again later to see if pt is more agreeable.       Electronically signed by Shahana Marie PT on 10/4/2022 at 8:58 AM

## 2022-10-04 NOTE — PROGRESS NOTES
Daily Progress Note  10/4/2022    Patient Name: Jessica Gandhi: Acute psychosis         SUBJECTIVE:      Patient is seen today for a follow up assessment. Patient is compliant with scheduled medication, continues to utilize as needed Atarax 50 mg p.o. last used 10/4/2022 0758. Nursing staff reports patient continued to experience irritability, agitation at times due to impairment in memory. Patient was accepting to follow-up assessment at first and reports to be doing \"okay\" then shortly became agitated and unwilling to comply with questions. Patient stated I am tired of it doing this every day and I do not want to talk\". Patient unwilling to engage with education support and encouragement. Patient refused physical therapy today. Patient continues to engage in group programming. Daughter is possibly working on guardianship paperwork. Patient continues to understand struggle for her post discharge placement. She is not appropriate for a lower level of care. There is risk of decompensation and patient warrants further hospitalization for safety and stabilization. Appetite:  [x] Adequate/Unchanged  [] Increased  [] Decreased      Sleep:       [x] Adequate/Unchanged  [] Fair  [] Poor      Group Attendance on Unit:   [x] Yes   [] Selectively    [] No    Compliant with scheduled medications: [x] Yes  [] No    Received emergency medications in past 24 hrs: [] Yes   [x] No    Medication Side Effects: Denies          Mental Status Exam  Level of consciousness: Alert and awake   Appearance: Appropriate attire for setting, seated in wheelchair, with fair  grooming and hygiene   Behavior/Motor: Approachable, pleasant to agitated/irritable.   Unwilling to engage with interviewer  Attitude toward examiner: Uncooperative, inattentive, poor eye contact  Speech: spontaneous, normal volume and rate to loud agitated tone  Mood: Patient reports \"okay\"  Affect: Incongruent  Thought processes: illogical, noncoherent  Thought content: Denies homicidal ideation  Suicidal Ideation: Patient unwilling to engage in follow-up assessment, unable to contract for safety of the unit. Delusions: No evidence of delusions. Perceptual Disturbance: Patient not appear to be responding to internal stimuli. Cognition: Oriented to self, location, and disorganized to time and situation  Memory: impaired  Insight: poor   Judgement: poor     Data   height is 5' 5\" (1.651 m) and weight is 207 lb (93.9 kg). Her oral temperature is 97.8 °F (36.6 °C). Her blood pressure is 108/70 and her pulse is 106 (abnormal). Her respiration is 14.    Labs:   Admission on 09/22/2022   Component Date Value Ref Range Status    POC Glucose 09/22/2022 135 (A)  65 - 105 mg/dL Final    POC Glucose 09/23/2022 168 (A)  65 - 105 mg/dL Final    WBC 09/23/2022 5.3  3.5 - 11.0 k/uL Final    RBC 09/23/2022 2.72 (A)  4.0 - 5.2 m/uL Final    Hemoglobin 09/23/2022 8.6 (A)  12.0 - 16.0 g/dL Final    Hematocrit 09/23/2022 26.8 (A)  36 - 46 % Final    MCV 09/23/2022 98.8  80 - 100 fL Final    MCH 09/23/2022 31.8  26 - 34 pg Final    MCHC 09/23/2022 32.2  31 - 37 g/dL Final    RDW 09/23/2022 14.9  11.5 - 14.9 % Final    Platelets 59/72/9499 434  150 - 450 k/uL Final    MPV 09/23/2022 7.8  6.0 - 12.0 fL Final    Glucose 09/23/2022 315 (A)  70 - 99 mg/dL Final    BUN 09/23/2022 13  6 - 20 mg/dL Final    Creatinine 09/23/2022 0.78  0.50 - 0.90 mg/dL Final    Calcium 09/23/2022 8.7  8.6 - 10.4 mg/dL Final    Sodium 09/23/2022 135  135 - 144 mmol/L Final    Potassium 09/23/2022 4.8  3.7 - 5.3 mmol/L Final    Chloride 09/23/2022 101  98 - 107 mmol/L Final    CO2 09/23/2022 24  20 - 31 mmol/L Final    Anion Gap 09/23/2022 10  9 - 17 mmol/L Final    Alkaline Phosphatase 09/23/2022 243 (A)  35 - 104 U/L Final    ALT 09/23/2022 10  5 - 33 U/L Final    AST 09/23/2022 8  <32 U/L Final    Total Bilirubin 09/23/2022 0.2 (A)  0.3 - 1.2 mg/dL Final    Total Protein 09/23/2022 6.8  6.4 - 8.3 g/dL Final    Albumin 09/23/2022 2.7 (A)  3.5 - 5.2 g/dL Final    GFR Non- 09/23/2022 >60  >60 mL/min Final    GFR  09/23/2022 >60  >60 mL/min Final    GFR Comment 09/23/2022        Final    Comment: Average GFR for 52-63 years old:   80 mL/min/1.73sq m  Chronic Kidney Disease:   <60 mL/min/1.73sq m  Kidney failure:   <15 mL/min/1.73sq m              eGFR calculated using average adult body mass. Additional eGFR calculator available at:        "[employer].br            POC Glucose 09/23/2022 222 (A)  65 - 105 mg/dL Final    POC Glucose 09/23/2022 286 (A)  65 - 105 mg/dL Final    POC Glucose 09/23/2022 237 (A)  65 - 105 mg/dL Final    POC Glucose 09/24/2022 185 (A)  65 - 105 mg/dL Final    POC Glucose 09/24/2022 177 (A)  65 - 105 mg/dL Final    POC Glucose 09/24/2022 241 (A)  65 - 105 mg/dL Final    POC Glucose 09/24/2022 221 (A)  65 - 105 mg/dL Final    POC Glucose 09/24/2022 248 (A)  65 - 105 mg/dL Final    Hepatitis C Ab 09/26/2022 NONREACTIVE  NONREACTIVE Final    Comment:       The hepatitis C procedure used in our laboratory is a Chemiluminescent test specific for   three recombinant HCV antigens. A negative anti-HCV result indicates that the antibodies to   hepatitis C virus are not present at this time. Individuals with reactive anti-HCV should be considered infected and infectious until proven   otherwise. Confirmation of all equivocal or reactive results is recommended by ordering   HCV RNA by PCR. POC Glucose 09/25/2022 142 (A)  65 - 105 mg/dL Final    POC Glucose 09/25/2022 204 (A)  65 - 105 mg/dL Final    POC Glucose 09/25/2022 255 (A)  65 - 105 mg/dL Final    POC Glucose 09/25/2022 224 (A)  65 - 105 mg/dL Final    HIV Ag/Ab 09/26/2022 NONREACTIVE  NONREACTIVE Final    Comment: No laboratory evidence of HIV infection. If acute HIV infection is suspected, consider   testing for HIV-1 RNA.       POC Glucose 09/26/2022 140 (A)  65 - 105 mg/dL Final    POC Glucose 09/26/2022 248 (A)  65 - 105 mg/dL Final    POC Glucose 09/26/2022 279 (A)  65 - 105 mg/dL Final    POC Glucose 09/26/2022 331 (A)  65 - 105 mg/dL Final    POC Glucose 09/27/2022 196 (A)  65 - 105 mg/dL Final    POC Glucose 09/27/2022 227 (A)  65 - 105 mg/dL Final    POC Glucose 09/27/2022 311 (A)  65 - 105 mg/dL Final    POC Glucose 09/27/2022 295 (A)  65 - 105 mg/dL Final    POC Glucose 09/27/2022 301 (A)  65 - 105 mg/dL Final    POC Glucose 09/28/2022 267 (A)  65 - 105 mg/dL Final    POC Glucose 09/28/2022 334 (A)  65 - 105 mg/dL Final    POC Glucose 09/28/2022 270 (A)  65 - 105 mg/dL Final    POC Glucose 09/28/2022 248 (A)  65 - 105 mg/dL Final    POC Glucose 09/28/2022 251 (A)  65 - 105 mg/dL Final    POC Glucose 09/28/2022 235 (A)  65 - 105 mg/dL Final    Critical Noted    POC Glucose 09/28/2022 323 (A)  65 - 105 mg/dL Final    POC Glucose 09/28/2022 475 (A)  65 - 105 mg/dL Final    Critical Noted    POC Glucose 09/28/2022 452 (A)  65 - 105 mg/dL Final    Critical Noted    POC Glucose 09/28/2022 423 (A)  65 - 105 mg/dL Final    Critical Noted    Glucose 09/29/2022 299 (A)  70 - 99 mg/dL Final    BUN 09/29/2022 14  6 - 20 mg/dL Final    Creatinine 09/29/2022 1.05 (A)  0.50 - 0.90 mg/dL Final    Calcium 09/29/2022 8.4 (A)  8.6 - 10.4 mg/dL Final    Sodium 09/29/2022 138  135 - 144 mmol/L Final    Potassium 09/29/2022 5.0  3.7 - 5.3 mmol/L Final    Chloride 09/29/2022 103  98 - 107 mmol/L Final    CO2 09/29/2022 26  20 - 31 mmol/L Final    Anion Gap 09/29/2022 9  9 - 17 mmol/L Final    GFR Non- 09/29/2022 54 (A)  >60 mL/min Final    GFR  09/29/2022 >60  >60 mL/min Final    GFR Comment 09/29/2022        Final    Comment: Average GFR for 52-63 years old:   80 mL/min/1.73sq m  Chronic Kidney Disease:   <60 mL/min/1.73sq m  Kidney failure:   <15 mL/min/1.73sq m              eGFR calculated using average adult body mass.  Additional eGFR calculator available at:        [employer]            POC Glucose 09/29/2022 153 (A)  65 - 105 mg/dL Final    POC Glucose 09/29/2022 244 (A)  65 - 105 mg/dL Final    POC Glucose 09/29/2022 172 (A)  65 - 105 mg/dL Final    POC Glucose 09/30/2022 162 (A)  65 - 105 mg/dL Final    POC Glucose 09/29/2022 250 (A)  65 - 105 mg/dL Final    POC Glucose 09/30/2022 262 (A)  65 - 105 mg/dL Final    POC Glucose 09/30/2022 199 (A)  65 - 105 mg/dL Final    POC Glucose 09/30/2022 239 (A)  65 - 105 mg/dL Final    POC Glucose 10/01/2022 122 (A)  65 - 105 mg/dL Final    POC Glucose 10/01/2022 179 (A)  65 - 105 mg/dL Final    POC Glucose 10/01/2022 167 (A)  65 - 105 mg/dL Final    POC Glucose 10/01/2022 185 (A)  65 - 105 mg/dL Final    POC Glucose 10/02/2022 166 (A)  65 - 105 mg/dL Final    POC Glucose 10/02/2022 271 (A)  65 - 105 mg/dL Final    POC Glucose 10/02/2022 324 (A)  65 - 105 mg/dL Final    POC Glucose 10/02/2022 315 (A)  65 - 105 mg/dL Final    POC Glucose 10/03/2022 232 (A)  65 - 105 mg/dL Final    POC Glucose 10/03/2022 407 (A)  65 - 105 mg/dL Final    Critical Noted    POC Glucose 10/03/2022 371 (A)  65 - 105 mg/dL Final    POC Glucose 10/03/2022 410 (A)  65 - 105 mg/dL Final    Critical Noted    POC Glucose 10/04/2022 212 (A)  65 - 105 mg/dL Final    POC Glucose 10/04/2022 388 (A)  65 - 105 mg/dL Final         Reviewed patient's current plan of care and vital signs with nursing staff.     Labs reviewed: [x] Yes    Medications  Current Facility-Administered Medications: albuterol sulfate HFA (PROVENTIL;VENTOLIN;PROAIR) 108 (90 Base) MCG/ACT inhaler 2 puff, 2 puff, Inhalation, Q4H PRN  insulin glargine (LANTUS) injection vial 30 Units, 30 Units, SubCUTAneous, BID  metFORMIN (GLUCOPHAGE) tablet 500 mg, 500 mg, Oral, BID WC  albuterol (PROVENTIL) nebulizer solution 2.5 mg, 2.5 mg, Nebulization, Q6H PRN  rivastigmine (EXELON) 4.6 MG/24HR 1 patch, 1 patch, TransDERmal, Daily  lidocaine 4 % external patch 1 patch, 1 patch, TransDERmal, Daily  insulin lispro (HUMALOG) injection vial 0-4 Units, 0-4 Units, SubCUTAneous, TID WC  insulin lispro (HUMALOG) injection vial 0-4 Units, 0-4 Units, SubCUTAneous, Nightly  atorvastatin (LIPITOR) tablet 40 mg, 40 mg, Oral, Nightly  glucagon (rDNA) injection 1 mg, 1 mg, SubCUTAneous, PRN  glucose chewable tablet 16 g, 4 tablet, Oral, PRN  OLANZapine zydis (ZYPREXA) disintegrating tablet 15 mg, 15 mg, Oral, Nightly  pantoprazole (PROTONIX) tablet 40 mg, 40 mg, Oral, BID  pregabalin (LYRICA) capsule 75 mg, 75 mg, Oral, BID  acetaminophen (TYLENOL) tablet 650 mg, 650 mg, Oral, Q6H PRN  ibuprofen (ADVIL;MOTRIN) tablet 400 mg, 400 mg, Oral, Q6H PRN  hydrOXYzine HCl (ATARAX) tablet 50 mg, 50 mg, Oral, TID PRN  traZODone (DESYREL) tablet 50 mg, 50 mg, Oral, Nightly PRN  polyethylene glycol (GLYCOLAX) packet 17 g, 17 g, Oral, Daily PRN  aluminum & magnesium hydroxide-simethicone (MAALOX) 200-200-20 MG/5ML suspension 30 mL, 30 mL, Oral, Q6H PRN  nicotine polacrilex (NICORETTE) gum 2 mg, 2 mg, Oral, Q2H PRN  haloperidol lactate (HALDOL) injection 5 mg, 5 mg, IntraMUSCular, Q6H PRN **AND** LORazepam (ATIVAN) injection 2 mg, 2 mg, IntraMUSCular, Q6H PRN **AND** diphenhydrAMINE (BENADRYL) injection 50 mg, 50 mg, IntraMUSCular, Q6H PRN    ASSESSMENT  Unspecified psychosis not due to a substance or known physiological condition [facility])         HANDOFF  Patient symptoms are: Unstable  Medications per attending physician  Encourage participation in groups and milieu. Probable discharge is to be determined by MD    Electronically signed by LAILA Barrera CNP on 10/4/2022 at 1:46 PM    **This report has been created using voice recognition software. It may contain minor errors which are inherent in voice recognition technology. **   I independently saw and evaluated the patient. I reviewed the nurse practioner's documentation above.   Any additional comments or changes to the documentation are stated below otherwise agree with assessment. because the patient was seen face to face. The patient reports an improvement in her mood. She is discharge focused. She continues to have poor insight into her cognitive difficulties    PLAN  Medications as noted above  Attempt to develop insight  Expected Length of Stay is 2-5 days. Psycho-education conducted. Supportive Therapy conducted.   Follow-up daily while on inpatient unit    Electronically signed by Susu Munguia MD on 10/4/22 at 6:54 PM EDT

## 2022-10-04 NOTE — BH NOTE
Notified Chepe Espino NP of patient blood sugar critical value of 410. Patient already has insulin scheduled for tonight, no new orders at this time.

## 2022-10-04 NOTE — BH NOTE
Lab called, random glucose test was at critical level of 505. Dr. Vijay Juárez was notified via phone. N.O. were received earlier today when he was rounding, states to see how she does with those. N.N.O received at this time. Patient educated on diabetic diet, specifically use of sugar in coffee and juice.

## 2022-10-04 NOTE — GROUP NOTE
Group Therapy Note    Date: 10/4/2022    Group Start Time: 1330  Group End Time: 5882  Group Topic: Relaxation Group   DANIELLE De Santiago Ma, CTRS        Group Therapy Note    Attendees: 12/19       Patient's Goal:  to improve relaxation using art     Notes:   pt was pleasant and participated well     Status After Intervention:  Improved    Participation Level:  Active Listener and Interactive    Participation Quality: Appropriate, Attentive, Sharing, and Supportive      Speech:  normal      Thought Process/Content: Logical      Affective Functioning: Congruent      Mood: euthymic      Level of consciousness:  Alert      Response to Learning: Able to verbalize current knowledge/experience and Progressing to goal      Endings: None Reported    Modes of Intervention: Education, Support, Socialization, and Clarifying      Discipline Responsible: Psychoeducational Specialist      Signature:  Estrella Yip

## 2022-10-04 NOTE — GROUP NOTE
Group Therapy Note    Date: 10/3/2022    Group Start Time: 2030  Group End Time: 2055  Group Topic: Wrap-Up    [facility] SHANNEN Carvalho        Group Therapy Note    Attendees: 10/18       Patient's Goal: Patient will verbalize today's goals as well as for post discharge. Patient will also offer supportive listening and interact with peers to clarify and understand clearly set goals. Notes: Patient is making progress AEB participating in group discussion, actively listening, and supporting other group members. Status After Intervention: Improved      Participation Level:  Active Listener and Interactive      Participation Quality: Appropriate, Attentive, Sharing, and Supportive      Speech: normal      Thought Process/Content: Logical, Linear      Affective Functioning: Congruent      Mood: anxious      Level of consciousness: Oriented x4      Response to Learning: Able to verbalize current knowledge/experience, Able to verbalize/acknowledge new learning,      Able to retain information, and Capable of insight      Endings: None Reported      Modes of Intervention: Education, Support, Socialization, and Problem-solving         Discipline Responsible: Behavorial Health Tech      Signature: Thierry Morgan

## 2022-10-04 NOTE — PROGRESS NOTES
Former[facility] Internal Medicine    Progress note             Date:   10/4/2022  Patient name:  Gladys Staton  Date of admission:  9/22/2022  3:40 PM  MRN:   [id number]  Account:  [de-identified]  YOB: 1965  PCP:    Jessica Dudley MD  Room:   [facility]0220-  Code Status:    Full Code    Physician Requesting Consult: Maya Arndt MD    Reason for Consult: History and physical, medical management    Chief Complaint:     No chief complaint on file. Medical management    History Obtained From:     Patient, EMR, nursing staff    History of Present Illness:     57-year-old female admitted for acute psychotic state    She was initially admitted to medical unit after being found unresponsive at her home, treated for DKA with IV insulin, acute renal failure with substantial hydration, and hypovolemic shock requiring Levophed, severe hypernatremia >170, concern for GI bleed status post EGD which showed multiple duodenal ulcers treated with PPI.   Patient remained confused and delusional, CT head negative, MRI could not be done due to lack of patient cooperation      Medical history includes asthma, hypertension, stroke, type 2 diabetes, polysubstance abuse, recent ischemic stroke  Past Medical History:     Past Medical History:   Diagnosis Date    Acid reflux 5/29/2017    Acute cystitis with hematuria 10/11/2016    Acute non-recurrent maxillary sinusitis 11/28/2017    Asthma     Bipolar 1 disorder ([address] 1/4/2018    Bipolar disorder, mixed ([address]     BMI 34.0-34.9,adult 11/28/2017    Cannabis use disorder, severe, dependence ([address] 12/19/2017    Cerebrovascular accident (CVA) ([address] 6/14/2017    Chest pain 11/5/2016    Chronic renal insufficiency     Cocaine abuse ([address] 1/5/2018    COVID-19 virus RNA test result unknown     DDD (degenerative disc disease), cervical     Diabetes mellitus ([address]     Dizziness 6/13/2017    Fibromyalgia     History of stroke 9/9/2017    Homicidal ideation 11/6/2017    Hyperglycemia     Hypertension     Hypotension 1/18/2019    IDDM (insulin dependent diabetes mellitus) 12/21/2015    Lupus ([address]     Migraine     Neuropathy     Neuropathy     Polysubstance abuse ([address] 9/17/2017    Post traumatic stress disorder (PTSD)     Posttraumatic stress disorder 5/29/2017    Recurrent depression ([address] 5/29/2017    Recurrent major depressive disorder, in partial remission ([address] 11/28/2017    Screening mammogram, encounter for 11/28/2017    Severe recurrent major depression with psychotic features ([address] 12/19/2017    Severe recurrent major depression without psychotic features ([address] 12/19/2017    Stroke (cerebrum) ([address] 6/14/2017    Stroke ([address]     per chart notes    Suicidal ideation     Suicidal intent 3/10/2017    Vitamin D deficiency 11/28/2017    White matter changes 6/13/2017        Past Surgical History:     Past Surgical History:   Procedure Laterality Date    ABDOMEN SURGERY      drain tube    ABSCESS DRAINAGE      right buttock    CATARACT REMOVAL WITH IMPLANT Bilateral     CHEST TUBE INSERTION      FINGER AMPUTATION Left 03/13/2021    LEFT RING FINER AMPUTATION performed by Lori Nair MD at [address] [facility] Right 10/11/2021    AMPUTATION RIGHT INDEX FINGER performed by Lori Nair MD at [address] Right 1/27/2022    FOOT ABSCESS INCISION AND DRAINAGE  (PULSE LAVAGE, CULTURE SWABS) performed by Hans Mir DPM at [address] [facility] Right 01/27/2022    FOOT ABSCESS INCISION AND DRAINAGE (PULSE LAVAGE, CULTURE SWABS) - Right    HAND SURGERY Right 09/14/2021    I&D INDEX FINGER performed by Lori Nair MD at [address] Right 09/15/2021    I&D INDEX FINGER performed by Lori Nair MD at [address]  2/3/2022         LASIK Bilateral     UPPER GASTROINTESTINAL ENDOSCOPY N/A 9/16/2022    EGD BIOPSY performed by Nallely Parks MD at [facility] Prior to Admission:     Prior to Admission medications    Medication Sig Start Date End Date Taking? Authorizing Provider   atorvastatin (LIPITOR) 40 MG tablet Take 1 tablet by mouth nightly 9/27/22 12/26/22 Yes Luz Leigh MD   blood glucose monitor strips Test 3 times a day & as needed for symptoms of irregular blood glucose. Dispense sufficient amount for indicated testing frequency plus additional to accommodate PRN testing needs. 9/27/22  Yes Luz Leigh MD   metFORMIN (GLUCOPHAGE) 1000 MG tablet Take 1 tablet by mouth 2 times daily (with meals) 9/27/22  Yes Luz Leigh MD   traZODone (DESYREL) 50 MG tablet Take 1 tablet by mouth nightly as needed for Sleep 9/27/22  Yes Luz Leigh MD   pantoprazole (PROTONIX) 40 MG tablet Take 1 tablet by mouth 2 times daily 9/27/22  Yes Luz Leigh MD   OLANZapine (ZYPREXA) 10 MG tablet Take 1.5 tablets by mouth nightly 9/27/22  Yes Luz Leigh MD   insulin glargine (LANTUS SOLOSTAR) 100 UNIT/ML injection pen Inject 30 Units into the skin 2 times daily 4/22/22   Klarissa Kent MD   fluticasone (FLONASE) 50 MCG/ACT nasal spray 1 spray by Each Nostril route daily 3/15/22   Klarissa Kent MD   Alcohol Swabs 70 % PADS Use 1 swab 3 times daily as needed 3/15/22   Klarissa Kent MD   Lancets MISC 1 each by Does not apply route 3 times daily 3/15/22   Klarissa Kent MD   Insulin Pen Needle ([employer] PEN NEEDLES 31G) 31G X 8 MM MISC 1 each by Does not apply route daily 3/15/22   Klarissa Kent MD   budesonide-formoterol (SYMBICORT) 80-4.5 MCG/ACT AERO Inhale 2 puffs into the lungs 2 times daily 3/15/22   Klarissa Kent MD   albuterol sulfate  (90 Base) MCG/ACT inhaler Inhale 2 puffs into the lungs every 4 hours as needed for Wheezing 3/15/22 4/15/22  Klarissa Kent MD   Misc.  Devices MISC 1 PAIR OF DIABETIC SHOES (1 LEFT/ 1 RIGHT)  1-3 PAIRS OF INSERTS (LEFT/ RIGHT) 2/15/22   Peg Cobia, DPM        Allergies:     Bactrim [sulfamethoxazole-trimethoprim] and Adhesive tape    Social History:     Tobacco:    reports that she has never smoked. She has never used smokeless tobacco.  Alcohol:      reports that she does not currently use alcohol. Drug Use:  reports current drug use. Drugs: Marijuana (Weed) and Cocaine. Family History:     Family History   Problem Relation Age of Onset    Diabetes Mother     Stroke Mother     Diabetes Father     Diabetes Sister     Diabetes Brother     Other Son         GSW    No Known Problems Sister        Review of Systems:     Positive and Negative as described in HPI. Denies any shortness of breath or cough  Denies chest pain or palpitations  Denies diarrhea vomiting. Reports mild epigastric pain  Denies any new numbness tremors or weakness. 10 point review of systems was negative other than mentioned above    Physical Exam:     /70   Pulse (!) 106   Temp 97.8 °F (36.6 °C) (Oral)   Resp 14   Ht 5' 5\" (1.651 m)   Wt 207 lb (93.9 kg)   LMP  (LMP Unknown)   PF (!) 14 L/min   BMI 34.45 kg/m²   Temp (24hrs), Av °F (36.7 °C), Min:97.8 °F (36.6 °C), Max:98.2 °F (36.8 °C)    Recent Labs     10/03/22  1128 10/03/22  1718 10/03/22  2009 10/04/22  0753   POCGLU 407* 371* 410* 212*     No intake or output data in the 24 hours ending 10/04/22 1044    General Appearance:  alert, well appearing, and in no acute distress  Head:  normocephalic, atraumatic. Eye: no icterus, redness, pupils equal and reactive, extraocular eye movements intact, conjunctiva clear  Ear: normal external ear, no discharge, hearing intact  Nose:  no drainage noted  Mouth: mucous membranes moist  Neck: supple, no carotid bruits, thyroid not palpable  Lungs: Bilateral equal air entry, clear to ausculation, no wheezing, rales or rhonchi, normal effort  Cardiovascular: normal rate, regular rhythm, no murmur, gallop, rub.   Abdomen: Soft, nontender, nondistended, normal bowel sounds, no hepatomegaly or splenomegaly  Neurologic: difficult to assess, limited pt co-operation. Alert, oriented X 1-2, mobile, power equal all 4 limibs. Normal speech but abnormal content, confabulation present.   No focal neurology otherwise  Skin: No gross lesions, rashes, bruising or bleeding on exposed skin area  Extremities:  No joint swelling, no pedal edema or calf pain with palpation      Investigations:      Laboratory Testing:  Recent Results (from the past 24 hour(s))   POC Glucose Fingerstick    Collection Time: 10/03/22 11:28 AM   Result Value Ref Range    POC Glucose 407 (HH) 65 - 105 mg/dL   POC Glucose Fingerstick    Collection Time: 10/03/22  5:18 PM   Result Value Ref Range    POC Glucose 371 (H) 65 - 105 mg/dL   POC Glucose Fingerstick    Collection Time: 10/03/22  8:09 PM   Result Value Ref Range    POC Glucose 410 (HH) 65 - 105 mg/dL   POC Glucose Fingerstick    Collection Time: 10/04/22  7:53 AM   Result Value Ref Range    POC Glucose 212 (H) 65 - 105 mg/dL       Imaging/Diagonstics:  Recent data reviewed    Assessment :      Primary Problem  Unspecified psychosis not due to a substance or known physiological condition [facility])    Active Hospital Problems    Diagnosis Date Noted    Unsp psychosis not due to a substance or known physiol cond ([address] [F29] 09/28/2022     Priority: Medium    Fentanyl use disorder, mild ([address] [F11.10] 09/23/2022     Priority: Medium    Unspecified psychosis not due to a substance or known physiological condition ([address] [F29] 09/22/2022     Priority: Medium    Cocaine use disorder ([address] [F14.10] 01/05/2018       Plan:     Reason for consult: General medical management     Acute psychosis - management per psych-  DKA- resolved  Type 2 DM- BG lantus, SSI- BG controlled, resume statin, metformin  Acute renal failure- creat normalized  Acute toxic encephalopathy- still encephalopathic  Hypertension- currently off meds- BP controlled  Upper GI bleed, duodenal ulcers-  c/w PPI  Recent stroke- off ASA due to recent GI bleed- Hb 7.6 last, will repeat    Repeat labs ordered, including HIV and hep C per patient request    DVT prophylaxis-not required, patient is mobile    10/4/22    Poor control dm  Recent Labs     10/03/22  1128 10/03/22  1718 10/03/22  2009 10/04/22  0753 10/04/22  1149   POCGLU 407* 371* 410* 212* 388*       Last creatinine 1.05            Was on metformin 1000 mg bid   Will resume 500 bid   Resume home dose 30 units  bid with meals . At present 17 units bid   Monitor              Consultations:   Kenyatta Carrillo MD  10/4/2022  10:44 AM    Copy sent to Brigette Shore MD    Please note that this chart was generated using voice recognition Dragon dictation software. Although every effort was made to ensure the accuracy of this automated transcription, some errors in transcription may have occurred.

## 2022-10-04 NOTE — PLAN OF CARE
Problem: Anxiety  Goal: Will report anxiety at manageable levels  Description: INTERVENTIONS:  1. Administer medication as ordered  2. Teach and rehearse alternative coping skills  3. Provide emotional support with 1:1 interaction with staff  10/3/2022 2215 by Prince Pepper  Outcome: Progressing  Patient admits to feelings of anxiety and rates the severity 7/10. Writer offered patient talk time, quiet room, and calming activities. Will continue to provide patient with non pharmacological methods to reduce anxiety. Problem: Confusion  Goal: Confusion, delirium, dementia, or psychosis is improved or at baseline  Description: INTERVENTIONS:  1. Assess for possible contributors to thought disturbance, including medications, impaired vision or hearing, underlying metabolic abnormalities, dehydration, psychiatric diagnoses, and notify attending LIP  2. [place] high risk fall precautions, as indicated  3. Provide frequent short contacts to provide reality reorientation, refocusing and direction  4. Decrease environmental stimuli, including noise as appropriate  5. Monitor and intervene to maintain adequate nutrition, hydration, elimination, sleep and activity  6. If unable to ensure safety without constant attention obtain sitter and review sitter guidelines with assigned personnel  7. Initiate Psychosocial CNS and Spiritual Care consult, as indicated  Outcome: Progressing  Patient does not show s/s of confusion, delirium, dementia or psychosis and is maintaining behavioral control. Staff will continue to monitor patient for perceptual disturbances and reorient patient as needed. Q15min checks for safety.      Problem: Self Harm/Suicidality  Goal: Will have no self-injury during hospital stay  Description: INTERVENTIONS:  1. Q 30 MINUTES: Routine safety checks  2. Q SHIFT & PRN: Assess risk to determine if routine checks are adequate to maintain patient safety  10/3/2022 2215 by Prince Pepper  Outcome: Progressing  Safe environment maintained and patient remains free from injury. Agreeable to seeking staff should injury occur or thoughts to injure self arise. Q15min checks for safety.

## 2022-10-04 NOTE — GROUP NOTE
Group Therapy Note    Date: 10/4/2022    Group Start Time: 0900  Group End Time: 2825  Group Topic: Group Documentation    [facility]    Sterling Dickerson RN        Group Therapy Note    Attendees: 10/20           Patient's Goal:  Work on jealousy, anger, and bitterness    Notes:  Goals group    Status After Intervention:  Unchanged    Participation Level:  Active Listener and Interactive, monopolizing    Participation Quality: Attentive, Sharing, and Supportive      Speech:  normal      Thought Process/Content: Logical      Affective Functioning: Exaggerated      Mood: elevated      Level of consciousness:  Alert and Attentive, Oriented x4      Response to Learning: Able to verbalize current knowledge/experience, Able to verbalize/acknowledge new learning, and Able to retain information      Endings: None Reported    Modes of Intervention: Support, Socialization, and Exploration      Discipline Responsible: [employer]      Signature:  Sterling Dickerson RN

## 2022-10-05 ENCOUNTER — TELEPHONE (OUTPATIENT)
Dept: FAMILY MEDICINE CLINIC | Age: 57
End: 2022-10-05

## 2022-10-05 LAB
GLUCOSE BLD-MCNC: 223 MG/DL (ref 65–105)
GLUCOSE BLD-MCNC: 249 MG/DL (ref 65–105)
GLUCOSE BLD-MCNC: 304 MG/DL (ref 65–105)
GLUCOSE BLD-MCNC: 384 MG/DL (ref 65–105)

## 2022-10-05 PROCEDURE — 6370000000 HC RX 637 (ALT 250 FOR IP): Performed by: INTERNAL MEDICINE

## 2022-10-05 PROCEDURE — APPSS30 APP SPLIT SHARED TIME 16-30 MINUTES

## 2022-10-05 PROCEDURE — 99232 SBSQ HOSP IP/OBS MODERATE 35: CPT | Performed by: INTERNAL MEDICINE

## 2022-10-05 PROCEDURE — 1240000000 HC EMOTIONAL WELLNESS R&B

## 2022-10-05 PROCEDURE — 99232 SBSQ HOSP IP/OBS MODERATE 35: CPT | Performed by: PSYCHIATRY & NEUROLOGY

## 2022-10-05 PROCEDURE — 97162 PT EVAL MOD COMPLEX 30 MIN: CPT

## 2022-10-05 PROCEDURE — 6370000000 HC RX 637 (ALT 250 FOR IP): Performed by: NURSE PRACTITIONER

## 2022-10-05 PROCEDURE — 82947 ASSAY GLUCOSE BLOOD QUANT: CPT

## 2022-10-05 PROCEDURE — 6370000000 HC RX 637 (ALT 250 FOR IP): Performed by: PSYCHIATRY & NEUROLOGY

## 2022-10-05 RX ORDER — INSULIN LISPRO 100 [IU]/ML
0-4 INJECTION, SOLUTION INTRAVENOUS; SUBCUTANEOUS NIGHTLY
Status: DISCONTINUED | OUTPATIENT
Start: 2022-10-05 | End: 2022-10-05

## 2022-10-05 RX ORDER — INSULIN LISPRO 100 [IU]/ML
0-16 INJECTION, SOLUTION INTRAVENOUS; SUBCUTANEOUS
Status: DISCONTINUED | OUTPATIENT
Start: 2022-10-05 | End: 2022-10-07

## 2022-10-05 RX ADMIN — INSULIN GLARGINE 30 UNITS: 100 INJECTION, SOLUTION SUBCUTANEOUS at 09:04

## 2022-10-05 RX ADMIN — METFORMIN HYDROCHLORIDE 500 MG: 500 TABLET ORAL at 09:03

## 2022-10-05 RX ADMIN — HYDROXYZINE HYDROCHLORIDE 50 MG: 50 TABLET ORAL at 21:26

## 2022-10-05 RX ADMIN — PANTOPRAZOLE SODIUM 40 MG: 40 TABLET, DELAYED RELEASE ORAL at 08:10

## 2022-10-05 RX ADMIN — ATORVASTATIN CALCIUM 40 MG: 20 TABLET, FILM COATED ORAL at 21:26

## 2022-10-05 RX ADMIN — IBUPROFEN 400 MG: 400 TABLET ORAL at 19:54

## 2022-10-05 RX ADMIN — PANTOPRAZOLE SODIUM 40 MG: 40 TABLET, DELAYED RELEASE ORAL at 21:26

## 2022-10-05 RX ADMIN — INSULIN LISPRO 16 UNITS: 100 INJECTION, SOLUTION INTRAVENOUS; SUBCUTANEOUS at 12:06

## 2022-10-05 RX ADMIN — ACETAMINOPHEN 650 MG: 325 TABLET, FILM COATED ORAL at 19:54

## 2022-10-05 RX ADMIN — INSULIN GLARGINE 30 UNITS: 100 INJECTION, SOLUTION SUBCUTANEOUS at 21:25

## 2022-10-05 RX ADMIN — INSULIN LISPRO 1 UNITS: 100 INJECTION, SOLUTION INTRAVENOUS; SUBCUTANEOUS at 09:04

## 2022-10-05 RX ADMIN — PREGABALIN 75 MG: 75 CAPSULE ORAL at 09:03

## 2022-10-05 RX ADMIN — PREGABALIN 75 MG: 75 CAPSULE ORAL at 21:26

## 2022-10-05 RX ADMIN — OLANZAPINE 15 MG: 10 TABLET, ORALLY DISINTEGRATING ORAL at 21:25

## 2022-10-05 RX ADMIN — TRAZODONE HYDROCHLORIDE 50 MG: 50 TABLET ORAL at 21:26

## 2022-10-05 RX ADMIN — ACETAMINOPHEN 650 MG: 325 TABLET, FILM COATED ORAL at 08:10

## 2022-10-05 RX ADMIN — HYDROXYZINE HYDROCHLORIDE 50 MG: 50 TABLET ORAL at 08:10

## 2022-10-05 RX ADMIN — INSULIN LISPRO 4 UNITS: 100 INJECTION, SOLUTION INTRAVENOUS; SUBCUTANEOUS at 17:03

## 2022-10-05 ASSESSMENT — PAIN DESCRIPTION - DESCRIPTORS
DESCRIPTORS: ACHING;DULL;PRESSURE;SHARP
DESCRIPTORS: ACHING;SORE

## 2022-10-05 ASSESSMENT — PAIN SCALES - GENERAL
PAINLEVEL_OUTOF10: 1
PAINLEVEL_OUTOF10: 3
PAINLEVEL_OUTOF10: 5

## 2022-10-05 ASSESSMENT — PAIN DESCRIPTION - LOCATION
LOCATION: BACK;LEG
LOCATION: LEG

## 2022-10-05 ASSESSMENT — PAIN DESCRIPTION - ORIENTATION
ORIENTATION: LEFT;RIGHT
ORIENTATION: LOWER

## 2022-10-05 ASSESSMENT — PAIN - FUNCTIONAL ASSESSMENT
PAIN_FUNCTIONAL_ASSESSMENT: PREVENTS OR INTERFERES SOME ACTIVE ACTIVITIES AND ADLS
PAIN_FUNCTIONAL_ASSESSMENT: ACTIVITIES ARE NOT PREVENTED

## 2022-10-05 NOTE — GROUP NOTE
Group Therapy Note    Date: 10/5/2022    Group Start Time: 1100  Group End Time: 3946  Group Topic: Cognitive Skills    [facility] CARA Link, CTRS        Group Therapy Note    Attendees: 9/19       Patient's Goal:  to improve cognitive skills / improve decision making skills     Notes:  pt was pleasant and participated well     Status After Intervention:  Improved    Participation Level:  Active Listener and Interactive    Participation Quality: Appropriate and Supportive      Speech:  normal      Thought Process/Content: Logical      Affective Functioning: Congruent      Mood: euthymic      Level of consciousness:  Alert      Response to Learning: Able to verbalize current knowledge/experience and Progressing to goal      Endings: None Reported    Modes of Intervention: Education, Support, and Problem-solving      Discipline Responsible: Psychoeducational Specialist      Signature:  Dana Douglas

## 2022-10-05 NOTE — GROUP NOTE
Group Therapy Note    Date: 10/5/2022    Group Start Time: 1000  Group End Time: 2181  Group Topic: Group Documentation    [facility]    Gabby De Leon, RN        Group Therapy Note    Attendees: 8/19    Recreation Group Note        Date: October 5, 2022 Start Time: 10am  End Time: 10:45am      Number of Participants in Group & Unit Census:  8/19    Topic: Conversation group    Goal of Group: To attend and participate in group therapy      Comments:     Patient did not participate in Recreation group, despite staff encouragement and explanation of benefits. Patient remain seclusive to self. Q15 minute safety checks maintained for patient safety and will continue to encourage patient to attend unit programming.

## 2022-10-05 NOTE — GROUP NOTE
Group Therapy Note    Date: 10/5/2022    Group Start Time: 0900  Group End Time: 0915  Group Topic: Community Meeting    [address] [facility]    [address] [address] Meeting Group Note        Date: October 5, 2022 Start Time: 9am  End Time: 10am      Number of Participants in Group & Unit Census:  13/19    Topic: Goals Group    Goal of Group: To establish a goal for the day      Comments:     Patient did not participate in [employer] group, despite staff encouragement and explanation of benefits. Patient remain seclusive to self. Q15 minute safety checks maintained for patient safety and will continue to encourage patient to attend unit programming.        Group Therapy Note    Attendees: 13/19

## 2022-10-05 NOTE — PLAN OF CARE
Problem: Anxiety  Goal: Will report anxiety at manageable levels  Description: INTERVENTIONS:  1. Administer medication as ordered  2. Teach and rehearse alternative coping skills  3. Provide emotional support with 1:1 interaction with staff  Flowsheets (Taken 10/3/2022 1731)  Will report anxiety at manageable levels:   Teach and rehearse alternative coping skills   Administer medication as ordered   Provide emotional support with 1:1 interaction with staff  Note: Patient is anxious and irritable at times, yelling at staff. Patient was given PRN Atarax. Patient is in behavioral control at this time. Problem: Confusion  Goal: Confusion, delirium, dementia, or psychosis is improved or at baseline  Description: INTERVENTIONS:  1. Assess for possible contributors to thought disturbance, including medications, impaired vision or hearing, underlying metabolic abnormalities, dehydration, psychiatric diagnoses, and notify attending LIP  2. [place] high risk fall precautions, as indicated  3. Provide frequent short contacts to provide reality reorientation, refocusing and direction  4. Decrease environmental stimuli, including noise as appropriate  5. Monitor and intervene to maintain adequate nutrition, hydration, elimination, sleep and activity  6. If unable to ensure safety without constant attention obtain sitter and review sitter guidelines with assigned personnel  7.  Initiate Psychosocial CNS and Spiritual Care consult, as indicated  Outcome: Progressing  Flowsheets (Taken 10/2/2022 1285 by Óscar Choi RN)  Effect of thought disturbance (confusion, delirium, dementia, or psychosis) are managed with adequate functional status:   Assess for contributors to thought disturbance, including medications, impaired vision or hearing, underlying metabolic abnormalities, dehydration, psychiatric diagnoses, notify [place] high risk fall precautions, as indicated   Provide frequent short contacts to provide reality reorientation, refocusing and direction   Decrease environmental stimuli, including noise as appropriate   Monitor and intervene to maintain adequate nutrition, hydration, elimination, sleep and activity  Note: No confusion or delirium noted at this time. Patient appears to be at baseline. Problem: Safety - Adult  Goal: Free from fall injury  10/5/2022 1151 by Charlee Tidwell LPN  Outcome: Progressing  Flowsheets (Taken 10/2/2022 2347 by vAani Braun RN)  Free From Fall Injury:   Instruct family/caregiver on patient safety   Based on caregiver fall risk screen, instruct family/caregiver to ask for assistance with transferring infant if caregiver noted to have fall risk factors  Note:   Patient remains free of falls and verbalizes understanding of individual fall risks. Patient uses wheelchair when up and transfers per self. Patient uses call light when assistance is needed. Patient wearing non skid footwear and encouraged to seek out staff for any assistance needed. 10/4/2022 2224 by Pina Duke  Outcome: Progressing  Flowsheets (Taken 10/2/2022 2347 by Avani Braun RN)  Free From Fall Injury:   Instruct family/caregiver on patient safety   Based on caregiver fall risk screen, instruct family/caregiver to ask for assistance with transferring infant if caregiver noted to have fall risk factors  Note: Pt remains free of falls and verbalizes understanding of individual fall risks. Pt wearing non skid footwear and encouraged to seek out staff for any assistance needed.         Problem: Pain  Goal: Verbalizes/displays adequate comfort level or baseline comfort level  Outcome: Progressing  Flowsheets (Taken 10/2/2022 2347 by Avani Braun RN)  Verbalizes/displays adequate comfort level or baseline comfort level:   Encourage patient to monitor pain and request assistance   Assess pain using appropriate pain scale   Administer analgesics based on type and severity of pain and evaluate response   Implement non-pharmacological measures as appropriate and evaluate response   Consider cultural and social influences on pain and pain management  Note: Patient reports pain @ 3 out of 10 to BLE. PRN, Tylenol given with effective results. Problem: Self Harm/Suicidality  Goal: Will have no self-injury during hospital stay  Description: INTERVENTIONS:  1. Q 30 MINUTES: Routine safety checks  2. Q SHIFT & PRN: Assess risk to determine if routine checks are adequate to maintain patient safety  10/5/2022 1151 by Mike Messer LPN  Outcome: Progressing  Note:    Patient denies thoughts of suicide or self-harm and agrees to seek out staff should negative thoughts arise. Patient denies hallucinations. Patient labile and irritable at times but is easily redirected. Attending groups and tending to ADL's. Patient is compliant with meds. Safe environment maintained. Q15 minute checks for safety continued per unit policy. Will continue to monitor for safety and provide support and reassurance as needed  10/4/2022 2224 by Go Alarcon  Outcome: Progressing  Note: Patient remains free from self harm at this time. Pt denies thoughts of self harm and is agreeable to seeking out should thoughts of self harm arise. Safe environment maintained. Q15 minute checks for safety cont per unit policy. Will cont to monitor for safety and provides support and reassurance as needed.

## 2022-10-05 NOTE — PROGRESS NOTES
*Patient stayed after service and shared her issues with writer    *Patient stated that God is the one that has been with her    *Patient very receptive to prayer       10/05/22 1426   Encounter Summary   Encounter Overview/Reason  Spiritual/Emotional Needs   Service Provided For: Patient   Referral/Consult From: Rounding;Patient   Last Encounter  10/05/22   Complexity of Encounter Moderate   Begin Time 0130   End Time  0215   Total Time Calculated 45 min   Spiritual/Emotional needs   Type Spiritual Support   Behavioral Health    Type  [employer] Group   Assessment/Intervention/Outcome   Assessment Calm;Coping; Hopeful;Tearful   Intervention Active listening;Confronted/Challenged; Discussed illness injury and its impact; Discussed belief system/Protestant practices/henry;Discussed meaning/purpose;Discussed relationship with God;Empowerment;Nurtured Hope;Prayer (assurance of)/[place];Sustaining Presence/Ministry of presence   Outcome Acceptance;Encouraged;Engaged in conversation;Expressed feelings, needs, and concerns;Expressed Gratitude;Receptive

## 2022-10-05 NOTE — PROGRESS NOTES
Physical Therapy  Facility/Department: [facility]  Physical Therapy Initial Assessment    Name: Shell Cali  : 1965  MRN: [id number]  Date of Service: 10/5/2022    Discharge Recommendations:  Patient would benefit from continued therapy after discharge   PT Equipment Recommendations  Equipment Needed: No      Patient Diagnosis(es): Diagnoses of Other hyperlipidemia and Diabetes mellitus type 2 in [facility]) were pertinent to this visit. Past Medical History:  has a past medical history of Acid reflux, Acute cystitis with hematuria, Acute non-recurrent maxillary sinusitis, Asthma, Bipolar 1 disorder ([address] Bipolar disorder, mixed ([address] BMI 34.0-34.9,adult, Cannabis use disorder, severe, dependence ([address] Cerebrovascular accident (CVA) ([address] Chest pain, Chronic renal insufficiency, Cocaine abuse ([address] COVID-19 virus RNA test result unknown, DDD (degenerative disc disease), cervical, Diabetes mellitus ([address] Dizziness, Fibromyalgia, History of stroke, Homicidal ideation, Hyperglycemia, Hypertension, Hypotension, IDDM (insulin dependent diabetes mellitus), Lupus ([address] Migraine, Neuropathy, Neuropathy, Polysubstance abuse ([address] Post traumatic stress disorder (PTSD), Posttraumatic stress disorder, Recurrent depression ([address] Recurrent major depressive disorder, in partial remission ([address] Screening mammogram, encounter for, Severe recurrent major depression with psychotic features ([address] Severe recurrent major depression without psychotic features ([address] Stroke (cerebrum) ([address] Stroke ([address] Suicidal ideation, Suicidal intent, Vitamin D deficiency, and White matter changes. Past Surgical History:  has a past surgical history that includes Abscess Drainage; chest tube insertion; Abdomen surgery; Cataract removal with implant (Bilateral); LASIK (Bilateral); Finger amputation (Left, 2021); Hand surgery (Right, 2021); Hand surgery (Right, 09/15/2021); Finger amputation (Right, 10/11/2021);  Foot surgery (Right, 01/27/2022); Foot Debridement (Right, 1/27/2022); Insert Midline Catheter (2/3/2022); and Upper gastrointestinal endoscopy (N/A, 9/16/2022). Assessment   Assessment: Pt is CGA/SBA for amb with RW. Pt has steady gait and reports hx of falls. Pt would benefit from continued PT. Treatment Diagnosis: impaired functional mobility 2* falls  Specific Instructions for Next Treatment: gait, balance, therex  Therapy Prognosis: Good;Fair  Decision Making: Medium Complexity  Exam: ROM, MMT, bed mobility, transfers, amb, balance  Clinical Presentation: Pt alert, agreeable to some PT, can become agitated at times.   Barriers to Learning: agitation  Requires PT Follow-Up: Yes  Activity Tolerance  Activity Tolerance: Treatment limited secondary to agitation     Plan   Physcial Therapy Plan  General Plan: 2-3 times per week  Specific Instructions for Next Treatment: gait, balance, therex  Current Treatment Recommendations: Strengthening, Balance training, Transfer training, Functional mobility training, Endurance training, Gait training, Equipment evaluation, education, & procurement, Patient/Caregiver education & training, Safety education & training, Therapeutic activities  Safety Devices  Type of Devices: Nurse notified, Patient at risk for falls, Left in bed  Restraints  Restraints Initially in Place: No     Restrictions  Restrictions/Precautions  Restrictions/Precautions: Fall Risk (reports \"falling alot\")  Required Braces or Orthoses?: No  Position Activity Restriction  Other position/activity restrictions: PT eval and treat     Subjective   Pain: Pt reports pain \"but nothing you can help me with so it doesn't matter\"  General  Chart Reviewed: Yes  Patient assessed for rehabilitation services?: Yes  Family / Caregiver Present: No  Referring Practitioner: Cris Mccarthy MD  Referral Date : 09/23/22  Diagnosis: psychosis  Follows Commands: Impaired (can follow commands, however does not always choose to)  Subjective  Subjective: Pt in common area eating breakfast, agreeable to some PT. Pt gets agitated by PLOF questions - limited hx taken. RN staff OKs. Social/Functional History  Social/Functional History  Lives With: Alone  Type of Home: Apartment  Home Layout: One level  Home Access:  (states \"1 step then slide step. \"  frustrated when OT attempt to inquire re \"Slide step\")  Bathroom Shower/Tub: Tub/Shower unit  Bathroom Equipment: Shower chair  Bathroom Accessibility: Accessible  Home Equipment: Larry Mandes, 4 wheeled, Walker, rolling  Has the patient had two or more falls in the past year or any fall with injury in the past year?: Yes  ADL Assistance: [address]: Needs assistance (reports having an aide to assist \"somtimes\" with household tasks)  Homemaking Responsibilities: Yes  Ambulation Assistance: Independent  Transfer Assistance: Independent  Active : No  Patient's  Info: Friends and cabs  Occupation: Unemployed  IADL Comments: sleeps in flat bed  Additional Comments: Pt reports no recent PT. Reports has support by family/friends. Minimal answered questions due to agitation.   Vision/Hearing  Vision  Vision: Within Functional Limits  Hearing  Hearing: Within functional limits    Cognition   Orientation  Overall Orientation Status: Within Functional Limits     Objective   Heart Rate: 94  Heart Rate Source: Monitor  BP: 111/62  MAP (Calculated): 78.33  Resp: 14  O2 Device: None (Room air)              AROM RLE (degrees)  RLE AROM: WFL  AROM LLE (degrees)  LLE AROM : WFL  AROM RUE (degrees)  RUE AROM : WFL  AROM LUE (degrees)  LUE AROM : WFL  Strength RLE  Strength RLE: WFL  Comment: Grossly 4-/5  Strength LLE  Strength LLE: WFL  Comment: Grossly 4-/5  Strength RUE  Comment: See OT  Strength LUE  Comment: See OT     Sensation  Overall Sensation Status:  (\"I don't know\")     Bed mobility  Rolling to Right: Stand by assistance  Sit to Supine: Stand by assistance  Bed Mobility Comments: Flat bed - back to bed end of session  Transfers  Sit to Stand: Stand by assistance  Stand to Sit: Stand by assistance  Comment: SBA , cues as needed to use RW. Ambulation  Surface: Level tile  Device: Rolling Walker  Assistance: Stand by assistance;Contact guard assistance  Quality of Gait: forward flexed trunk, slow moving  Gait Deviations: Slow Sonia  Distance: 30'  Comments: No signs of fatigue. Pt agreeable to some ambulation in the room. RW left in room and notified nursing. Balance  Posture: Fair  Sitting - Static: Good;-  Sitting - Dynamic: Good;-  Standing - Static: Fair;+  Standing - Dynamic: Fair;+  Comments: standing balance with RW                    AM-PAC Score  AM-PAC Inpatient Mobility Raw Score : 18 (10/05/22 1119)  AM-PAC Inpatient T-Scale Score : 43.63 (10/05/22 1119)  Mobility Inpatient CMS 0-100% Score: 46.58 (10/05/22 1119)  Mobility Inpatient CMS G-Code Modifier : CK (10/05/22 1119)               Goals  Short Term Goals  Time Frame for Short Term Goals: 5 days  Short Term Goal 1: Pt to demo bed mobility IND. Short Term Goal 2: Pt to perform transfers MOD I with good technique. Short Term Goal 3: Pt to amb 100' with device SBA. Short Term Goal 4: Pt to improve BLE strength by 1/2 MMG. Short Term Goal 5: Pt to improve standing balance to GOOD- to reduce fall risk. Patient Goals   Patient Goals : To go home       Education  Patient Education  Education Given To: Patient  Education Provided: Role of Therapy;Plan of Care;Precautions;Transfer Training;Equipment; Fall Prevention Strategies  Education Method: Demonstration;Verbal  Education Outcome: Continued education needed      Therapy Time   Individual Concurrent Group Co-treatment   Time In [address] [place] [address]         Time Out 0546         Minutes [address]

## 2022-10-05 NOTE — BH NOTE
Patient has elevated blood sugar of 384. Dr. César Lemon notified. Meds were previously adjusted, s/s increased and labs ordered.

## 2022-10-05 NOTE — PROGRESS NOTES
[place] [address] Internal Medicine    Progress note             Date:   10/5/2022  Patient name:  Reva Benson  Date of admission:  2022  3:40 PM  MRN:   [id number]  Account:  [de-identified]  YOB: 1965  PCP:    Kayleigh Rizzo MD  Room:   [facility]-  Code Status:    Full Code    Physician Requesting Consult: Silvana Bravo MD    Reason for Consult: History and physical, medical management    Chief Complaint:     No chief complaint on file. Medical management    History Obtained From:     Patient, EMR, nursing staff    History of Present Illness:     57-year-old female admitted for acute psychotic state    She was initially admitted to medical unit after being found unresponsive at her home, treated for DKA with IV insulin, acute renal failure with substantial hydration, and hypovolemic shock requiring Levophed, severe hypernatremia >170, concern for GI bleed status post EGD which showed multiple duodenal ulcers treated with PPI.   Patient remained confused and delusional, CT head negative, MRI could not be done due to lack of patient cooperation      Medical history includes asthma, hypertension, stroke, type 2 diabetes, polysubstance abuse, recent ischemic stroke  Past Medical History:     Past Medical History:   Diagnosis Date    Acid reflux 2017    Acute cystitis with hematuria 10/11/2016    Acute non-recurrent maxillary sinusitis 2017    Asthma     Bipolar 1 disorder ([address] 2018    Bipolar disorder, mixed ([address]     BMI 34.0-34.9,adult 2017    Cannabis use disorder, severe, dependence ([address] 2017    Cerebrovascular accident (CVA) ([address] 2017    Chest pain 2016    Chronic renal insufficiency     Cocaine abuse ([address] 2018    COVID-19 virus RNA test result unknown     DDD (degenerative disc disease), cervical     Diabetes mellitus ([address]     Dizziness 2017    Fibromyalgia     History of stroke 2017    Homicidal ideation 11/6/2017    Hyperglycemia     Hypertension     Hypotension 1/18/2019    IDDM (insulin dependent diabetes mellitus) 12/21/2015    Lupus ([address]     Migraine     Neuropathy     Neuropathy     Polysubstance abuse ([address] 9/17/2017    Post traumatic stress disorder (PTSD)     Posttraumatic stress disorder 5/29/2017    Recurrent depression ([address] 5/29/2017    Recurrent major depressive disorder, in partial remission ([address] 11/28/2017    Screening mammogram, encounter for 11/28/2017    Severe recurrent major depression with psychotic features ([address] 12/19/2017    Severe recurrent major depression without psychotic features ([address] 12/19/2017    Stroke (cerebrum) ([address] 6/14/2017    Stroke ([address]     per chart notes    Suicidal ideation     Suicidal intent 3/10/2017    Vitamin D deficiency 11/28/2017    White matter changes 6/13/2017        Past Surgical History:     Past Surgical History:   Procedure Laterality Date    ABDOMEN SURGERY      drain tube    ABSCESS DRAINAGE      right buttock    CATARACT REMOVAL WITH IMPLANT Bilateral     CHEST TUBE INSERTION      FINGER AMPUTATION Left 03/13/2021    LEFT RING FINER AMPUTATION performed by Cecille James MD at [place] [address] Right 10/11/2021    AMPUTATION RIGHT INDEX FINGER performed by Cecille James MD at [facility] Right 1/27/2022    FOOT ABSCESS INCISION AND DRAINAGE  (PULSE LAVAGE, CULTURE SWABS) performed by Samantha Frazier DPM at [facility] Right 01/27/2022    FOOT ABSCESS INCISION AND DRAINAGE (PULSE LAVAGE, CULTURE SWABS) - Right    HAND SURGERY Right 09/14/2021    I&D INDEX FINGER performed by Cecille James MD at [place] [address] Right 09/15/2021    I&D INDEX FINGER performed by Cecille James MD at [address]  2/3/2022         LASIK Bilateral     UPPER GASTROINTESTINAL ENDOSCOPY N/A 9/16/2022    EGD BIOPSY performed by Lessie Crigler, MD at [facility] Prior to Admission:     Prior to Admission medications    Medication Sig Start Date End Date Taking? Authorizing Provider   atorvastatin (LIPITOR) 40 MG tablet Take 1 tablet by mouth nightly 9/27/22 12/26/22 Yes Danis Escobar MD   blood glucose monitor strips Test 3 times a day & as needed for symptoms of irregular blood glucose. Dispense sufficient amount for indicated testing frequency plus additional to accommodate PRN testing needs. 9/27/22  Yes Danis Escobar MD   metFORMIN (GLUCOPHAGE) 1000 MG tablet Take 1 tablet by mouth 2 times daily (with meals) 9/27/22  Yes Danis Escobar MD   traZODone (DESYREL) 50 MG tablet Take 1 tablet by mouth nightly as needed for Sleep 9/27/22  Yes Danis Escobar MD   pantoprazole (PROTONIX) 40 MG tablet Take 1 tablet by mouth 2 times daily 9/27/22  Yes Danis Escobar MD   OLANZapine (ZYPREXA) 10 MG tablet Take 1.5 tablets by mouth nightly 9/27/22  Yes Danis Escobar MD   insulin glargine (LANTUS SOLOSTAR) 100 UNIT/ML injection pen Inject 30 Units into the skin 2 times daily 4/22/22   Taylor Franks MD   fluticasone (FLONASE) 50 MCG/ACT nasal spray 1 spray by Each Nostril route daily 3/15/22   Taylor Franks MD   Alcohol Swabs 70 % PADS Use 1 swab 3 times daily as needed 3/15/22   Taylor Franks MD   Lancets MISC 1 each by Does not apply route 3 times daily 3/15/22   Taylor Franks MD   Insulin Pen Needle ([employer] PEN NEEDLES 31G) 31G X 8 MM MISC 1 each by Does not apply route daily 3/15/22   Taylor Franks MD   budesonide-formoterol (SYMBICORT) 80-4.5 MCG/ACT AERO Inhale 2 puffs into the lungs 2 times daily 3/15/22   Taylor Franks MD   albuterol sulfate  (90 Base) MCG/ACT inhaler Inhale 2 puffs into the lungs every 4 hours as needed for Wheezing 3/15/22 4/15/22  Taylor Franks, MD Sam.  Devices MISC 1 PAIR OF DIABETIC SHOES (1 LEFT/ 1 RIGHT)  1-3 PAIRS OF INSERTS (LEFT/ RIGHT) 2/15/22   Liliya Rizo DPM        Allergies:     Bactrim [sulfamethoxazole-trimethoprim] and Adhesive tape    Social History:     Tobacco:    reports that she has never smoked. She has never used smokeless tobacco.  Alcohol:      reports that she does not currently use alcohol. Drug Use:  reports current drug use. Drugs: Marijuana (Weed) and Cocaine. Family History:     Family History   Problem Relation Age of Onset    Diabetes Mother     Stroke Mother     Diabetes Father     Diabetes Sister     Diabetes Brother     Other Son         GSW    No Known Problems Sister        Review of Systems:     Positive and Negative as described in HPI. Denies any shortness of breath or cough  Denies chest pain or palpitations  Denies diarrhea vomiting. Reports mild epigastric pain  Denies any new numbness tremors or weakness. 10 point review of systems was negative other than mentioned above    Physical Exam:     /62   Pulse 94   Temp 98.2 °F (36.8 °C) (Oral)   Resp 14   Ht 5' 5\" (1.651 m)   Wt 207 lb (93.9 kg)   LMP  (LMP Unknown)   PF (!) 14 L/min   BMI 34.45 kg/m²   Temp (24hrs), Av.2 °F (36.8 °C), Min:98.2 °F (36.8 °C), Max:98.2 °F (36.8 °C)    Recent Labs     10/04/22  1149 10/04/22  1640 10/04/22  2043 10/05/22  0813   POCGLU 388* 332* 337* 249*       No intake or output data in the 24 hours ending 10/05/22 1000    General Appearance:  alert, well appearing, and in no acute distress  Head:  normocephalic, atraumatic. Eye: no icterus, redness, pupils equal and reactive, extraocular eye movements intact, conjunctiva clear  Ear: normal external ear, no discharge, hearing intact  Nose:  no drainage noted  Mouth: mucous membranes moist  Neck: supple, no carotid bruits, thyroid not palpable  Lungs: Bilateral equal air entry, clear to ausculation, no wheezing, rales or rhonchi, normal effort  Cardiovascular: normal rate, regular rhythm, no murmur, gallop, rub.   Abdomen: Soft, nontender, nondistended, normal bowel sounds, no hepatomegaly or splenomegaly  Neurologic: difficult to assess, limited pt co-operation. Alert, oriented X 1-2, mobile, power equal all 4 limibs. Normal speech but abnormal content, confabulation present.   No focal neurology otherwise  Skin: No gross lesions, rashes, bruising or bleeding on exposed skin area  Extremities:  No joint swelling, no pedal edema or calf pain with palpation      Investigations:      Laboratory Testing:  Recent Results (from the past 24 hour(s))   POC Glucose Fingerstick    Collection Time: 10/04/22 11:49 AM   Result Value Ref Range    POC Glucose 388 (H) 65 - 105 mg/dL   Basic Metabolic Panel    Collection Time: 10/04/22  1:24 PM   Result Value Ref Range    Glucose 505 (HH) 70 - 99 mg/dL    BUN 13 6 - 20 mg/dL    Creatinine 1.14 (H) 0.50 - 0.90 mg/dL    Calcium 8.8 8.6 - 10.4 mg/dL    Sodium 133 (L) 135 - 144 mmol/L    Potassium 5.6 (H) 3.7 - 5.3 mmol/L    Chloride 98 98 - 107 mmol/L    CO2 26 20 - 31 mmol/L    Anion Gap 9 9 - 17 mmol/L    Est, Glom Filt Rate 57 (L) >60 mL/min/1.73m2   POC Glucose Fingerstick    Collection Time: 10/04/22  4:40 PM   Result Value Ref Range    POC Glucose 332 (H) 65 - 105 mg/dL   POC Glucose Fingerstick    Collection Time: 10/04/22  8:43 PM   Result Value Ref Range    POC Glucose 337 (H) 65 - 105 mg/dL   POC Glucose Fingerstick    Collection Time: 10/05/22  8:13 AM   Result Value Ref Range    POC Glucose 249 (H) 65 - 105 mg/dL       Imaging/Diagonstics:  Recent data reviewed    Assessment :      Primary Problem  Unspecified psychosis not due to a substance or known physiological condition [facility])    Active Hospital Problems    Diagnosis Date Noted    Unsp psychosis not due to a substance or known physiol cond ([address] [F29] 09/28/2022     Priority: Medium    Fentanyl use disorder, mild ([address] [F11.10] 09/23/2022     Priority: Medium    Unspecified psychosis not due to a substance or known physiological condition ([address] [F29] 09/22/2022     Priority: Medium    Cocaine use disorder [facility]) [F14.10] 01/05/2018       Plan:     Reason for consult: General medical management     Acute psychosis - management per psych-  DKA- resolved  Type 2 DM- BG lantus, SSI- BG controlled, resume statin, metformin  Acute renal failure- creat normalized  Acute toxic encephalopathy- still encephalopathic  Hypertension- currently off meds- BP controlled  Upper GI bleed, duodenal ulcers-  c/w PPI  Recent stroke- off ASA due to recent GI bleed- Hb 7.6 last, will repeat    Repeat labs ordered, including HIV and hep C per patient request    DVT prophylaxis-not required, patient is mobile    10/4/22    Poor control dm  Recent Labs     10/04/22  0753 10/04/22  1149 10/04/22  1640 10/04/22  2043 10/05/22  0813   POCGLU 212* 388* 332* 337* 249*         Last creatinine 1.05            Was on metformin 1000 mg bid   Will resume 500 bid   Resume home dose 30 units  bid with meals . At present 17 units bid   Monitor              10/5/22    Poor control DM ,  Increase sliding scale . Increase metformin 850 bid   Check BMP , hbaic  Monitor           Consultations:   Mathieu Jc MD  10/5/2022  10:00 AM    Copy sent to Filemon Moss MD    Please note that this chart was generated using voice recognition Dragon dictation software. Although every effort was made to ensure the accuracy of this automated transcription, some errors in transcription may have occurred.

## 2022-10-05 NOTE — CARE COORDINATION
SOCIAL SERVICE NOTE: CLAYTON speaks with Chasity Bill at [phone] on this date due to PT request. Olivia Brantley confirms that PT is not able to stay with her at discharge and reports that she is noit able to take care of PT anymore. CLAYTON calls PT daughter Royce Jay at [phone] who also confirms that she is unable to have PT stay with her st discharge. CLAYTON follows up with Ana Maria regarding status of filling for Guardianship. Ana Maria was provided with a copy of the Expert evaluation form completed by Dr Kerry Sandoval due to pt request. Ana Maria reports that she has not been able to get assistance from  or at the probate court. CLAYTON suggests that she go to the probate court with the paperwork. Ananya reports that she plans on following up with probate court today to attempt to file guardianship. Lenard Murry know that PT has been approved for a nursing home placement, but that if PT doesn't agree with placement it will be difficult to get her to go to a nursing facility since pt is currently her own guardian at this time since the guardianship has not yet been filed. CLAYTON also let Ananya Know that Pt physician is planning on discharging pt to a shelter if the guardianship has not been put in process. CLAYTON faxes referrals on this date to [address] fax # [phone], phone [phone] contact Daniela [address] Fax # [phone] contact Pamela Shafer. CLAYTON will continue to monitor the situation.

## 2022-10-05 NOTE — TELEPHONE ENCOUNTER
FYI care source contacted office wanted to inform pt is admitted at [facility] from 9-22-22 to present.

## 2022-10-05 NOTE — BH NOTE
Dr. Ronita Boxer notified of labs as follows, K+ 5.6, sodium 133, Creatinine 114.  BMP and A1C ordered for tomorrow am.

## 2022-10-05 NOTE — PLAN OF CARE
Problem: Safety - Adult  Goal: Free from fall injury  10/4/2022 2224 by Karson Us  Outcome: Progressing  Free From Fall Injury:   Instruct family/caregiver on patient safety   Based on caregiver fall risk screen, instruct family/caregiver to ask for assistance with transferring infant if caregiver noted to have fall risk factors  Note: Pt remains free of falls and verbalizes understanding of individual fall risks. Pt wearing non skid footwear and encouraged to seek out staff for any assistance needed. Problem: Self Harm/Suicidality  Goal: Will have no self-injury during hospital stay  Description: INTERVENTIONS:  1. Q 30 MINUTES: Routine safety checks  2. Q SHIFT & PRN: Assess risk to determine if routine checks are adequate to maintain patient safety  10/4/2022 2224 by Karson Us  Outcome: Progressing  Note: Patient remains free from self harm at this time. Pt denies thoughts of self harm and is agreeable to seeking out should thoughts of self harm arise. Safe environment maintained. Q15 minute checks for safety cont per unit policy. Will cont to monitor for safety and provides support and reassurance as needed.

## 2022-10-05 NOTE — PROGRESS NOTES
Daily Progress Note  10/5/2022    Patient Name: James Baas: Acute psychosis         SUBJECTIVE:      Patient is seen today for a follow up assessment. Patient is reports being agreeable to follow-up assessment at first and states doing \"okay\". However she shortly became verbally loud and aggressively and and informed writer to \"Fuck that shit, did get out\". She continues to be loud and screaming in the room for writer to leave her alone. She continues to be compliant with selective treatment options often refusing vital checks or physical therapy. She continues to be noncompliant with and continues to have increase in blood sugar, recent increase in metformin and lantus, current . Patient continues to be compliant with scheduled medication with no reported side effects. She continues to utilize Atarax 50 mg p.o. as needed last used 10/5/2022 at 0810. Per nursing staff patient continues to be discharge focused and she continues to show poor insight to her mental illness. She denies any medication side effects or any other medical concerns at this time. Patient is at risk for decompensation and warrants further hospitalization for safety and stability. Appetite:  [x] Adequate/Unchanged  [] Increased  [] Decreased      Sleep:       [x] Adequate/Unchanged  [] Fair  [] Poor      Group Attendance on Unit:   [x] Yes   [] Selectively    [] No    Compliant with scheduled medications: [x] Yes  [] No    Received emergency medications in past 24 hrs: [] Yes   [x] No    Medication Side Effects: Denies          Mental Status Exam  Level of consciousness: Alert and awake   Appearance: Appropriate attire for setting, seated in wheelchair, with fair  grooming and hygiene   Behavior/Motor: Approachable, pleasant to agitated/irritable.   Unwilling to engage with interviewer  Attitude toward examiner: Uncooperative, inattentive, poor eye contact  Speech: spontaneous, normal volume and rate to loud agitated tone  Mood: Patient reports \"okay\"  Affect: Incongruent  Thought processes: illogical, noncoherent  Thought content: Denies homicidal ideation  Suicidal Ideation: Patient unwilling to engage in follow-up assessment, unable to contract for safety of the unit. Delusions: No evidence of delusions. Perceptual Disturbance: Patient not appear to be responding to internal stimuli. Cognition: Oriented to self, location, and disorganized to time and situation  Memory: impaired  Insight: poor   Judgement: poor     Data   height is 5' 5\" (1.651 m) and weight is 207 lb (93.9 kg). Her oral temperature is 98.2 °F (36.8 °C). Her blood pressure is 111/62 and her pulse is 94. Her respiration is 14.    Labs:   Admission on 09/22/2022   Component Date Value Ref Range Status    POC Glucose 09/22/2022 135 (A)  65 - 105 mg/dL Final    POC Glucose 09/23/2022 168 (A)  65 - 105 mg/dL Final    WBC 09/23/2022 5.3  3.5 - 11.0 k/uL Final    RBC 09/23/2022 2.72 (A)  4.0 - 5.2 m/uL Final    Hemoglobin 09/23/2022 8.6 (A)  12.0 - 16.0 g/dL Final    Hematocrit 09/23/2022 26.8 (A)  36 - 46 % Final    MCV 09/23/2022 98.8  80 - 100 fL Final    MCH 09/23/2022 31.8  26 - 34 pg Final    MCHC 09/23/2022 32.2  31 - 37 g/dL Final    RDW 09/23/2022 14.9  11.5 - 14.9 % Final    Platelets 72/78/5853 434  150 - 450 k/uL Final    MPV 09/23/2022 7.8  6.0 - 12.0 fL Final    Glucose 09/23/2022 315 (A)  70 - 99 mg/dL Final    BUN 09/23/2022 13  6 - 20 mg/dL Final    Creatinine 09/23/2022 0.78  0.50 - 0.90 mg/dL Final    Calcium 09/23/2022 8.7  8.6 - 10.4 mg/dL Final    Sodium 09/23/2022 135  135 - 144 mmol/L Final    Potassium 09/23/2022 4.8  3.7 - 5.3 mmol/L Final    Chloride 09/23/2022 101  98 - 107 mmol/L Final    CO2 09/23/2022 24  20 - 31 mmol/L Final    Anion Gap 09/23/2022 10  9 - 17 mmol/L Final    Alkaline Phosphatase 09/23/2022 243 (A)  35 - 104 U/L Final    ALT 09/23/2022 10  5 - 33 U/L Final    AST 09/23/2022 8  <32 U/L Final    Total Bilirubin 09/23/2022 0.2 (A)  0.3 - 1.2 mg/dL Final    Total Protein 09/23/2022 6.8  6.4 - 8.3 g/dL Final    Albumin 09/23/2022 2.7 (A)  3.5 - 5.2 g/dL Final    GFR Non- 09/23/2022 >60  >60 mL/min Final    GFR  09/23/2022 >60  >60 mL/min Final    GFR Comment 09/23/2022        Final    Comment: Average GFR for 52-63 years old:   80 mL/min/1.73sq m  Chronic Kidney Disease:   <60 mL/min/1.73sq m  Kidney failure:   <15 mL/min/1.73sq m              eGFR calculated using average adult body mass. Additional eGFR calculator available at:        [email or web address]            POC Glucose 09/23/2022 222 (A)  65 - 105 mg/dL Final    POC Glucose 09/23/2022 286 (A)  65 - 105 mg/dL Final    POC Glucose 09/23/2022 237 (A)  65 - 105 mg/dL Final    POC Glucose 09/24/2022 185 (A)  65 - 105 mg/dL Final    POC Glucose 09/24/2022 177 (A)  65 - 105 mg/dL Final    POC Glucose 09/24/2022 241 (A)  65 - 105 mg/dL Final    POC Glucose 09/24/2022 221 (A)  65 - 105 mg/dL Final    POC Glucose 09/24/2022 248 (A)  65 - 105 mg/dL Final    Hepatitis C Ab 09/26/2022 NONREACTIVE  NONREACTIVE Final    Comment:       The hepatitis C procedure used in our laboratory is a Chemiluminescent test specific for   three recombinant HCV antigens. A negative anti-HCV result indicates that the antibodies to   hepatitis C virus are not present at this time. Individuals with reactive anti-HCV should be considered infected and infectious until proven   otherwise. Confirmation of all equivocal or reactive results is recommended by ordering   HCV RNA by PCR. POC Glucose 09/25/2022 142 (A)  65 - 105 mg/dL Final    POC Glucose 09/25/2022 204 (A)  65 - 105 mg/dL Final    POC Glucose 09/25/2022 255 (A)  65 - 105 mg/dL Final    POC Glucose 09/25/2022 224 (A)  65 - 105 mg/dL Final    HIV Ag/Ab 09/26/2022 NONREACTIVE  NONREACTIVE Final    Comment: No laboratory evidence of HIV infection.   If acute HIV mL/min/1.73sq m              eGFR calculated using average adult body mass.  Additional eGFR calculator available at:        [email or web address]            POC Glucose 09/29/2022 153 (A)  65 - 105 mg/dL Final    POC Glucose 09/29/2022 244 (A)  65 - 105 mg/dL Final    POC Glucose 09/29/2022 172 (A)  65 - 105 mg/dL Final    POC Glucose 09/30/2022 162 (A)  65 - 105 mg/dL Final    POC Glucose 09/29/2022 250 (A)  65 - 105 mg/dL Final    POC Glucose 09/30/2022 262 (A)  65 - 105 mg/dL Final    POC Glucose 09/30/2022 199 (A)  65 - 105 mg/dL Final    POC Glucose 09/30/2022 239 (A)  65 - 105 mg/dL Final    POC Glucose 10/01/2022 122 (A)  65 - 105 mg/dL Final    POC Glucose 10/01/2022 179 (A)  65 - 105 mg/dL Final    POC Glucose 10/01/2022 167 (A)  65 - 105 mg/dL Final    POC Glucose 10/01/2022 185 (A)  65 - 105 mg/dL Final    POC Glucose 10/02/2022 166 (A)  65 - 105 mg/dL Final    POC Glucose 10/02/2022 271 (A)  65 - 105 mg/dL Final    POC Glucose 10/02/2022 324 (A)  65 - 105 mg/dL Final    POC Glucose 10/02/2022 315 (A)  65 - 105 mg/dL Final    POC Glucose 10/03/2022 232 (A)  65 - 105 mg/dL Final    POC Glucose 10/03/2022 407 (A)  65 - 105 mg/dL Final    Critical Noted    POC Glucose 10/03/2022 371 (A)  65 - 105 mg/dL Final    POC Glucose 10/03/2022 410 (A)  65 - 105 mg/dL Final    Critical Noted    POC Glucose 10/04/2022 212 (A)  65 - 105 mg/dL Final    Glucose 10/04/2022 505 (A)  70 - 99 mg/dL Final    BUN 10/04/2022 13  6 - 20 mg/dL Final    Creatinine 10/04/2022 1.14 (A)  0.50 - 0.90 mg/dL Final    Calcium 10/04/2022 8.8  8.6 - 10.4 mg/dL Final    Sodium 10/04/2022 133 (A)  135 - 144 mmol/L Final    Potassium 10/04/2022 5.6 (A)  3.7 - 5.3 mmol/L Final    Chloride 10/04/2022 98  98 - 107 mmol/L Final    CO2 10/04/2022 26  20 - 31 mmol/L Final    Anion Gap 10/04/2022 9  9 - 17 mmol/L Final    Est, Glom Filt Rate 10/04/2022 57 (A)  >60 mL/min/1.73m2 Final    Comment:       Effective Oct 3, 2022 These results are not intended for use in patients <25years of age. eGFR results are calculated without a race factor using the 2021 CKD-EPI equation. Careful clinical correlation is recommended, particularly when comparing to results   calculated using previous equations. The CKD-EPI equation is less accurate in patients with extremes of muscle mass, extra-renal   metabolism of creatine, excessive creatine ingestion, or following therapy that affects   renal tubular secretion. POC Glucose 10/04/2022 388 (A)  65 - 105 mg/dL Final    POC Glucose 10/04/2022 332 (A)  65 - 105 mg/dL Final    POC Glucose 10/04/2022 337 (A)  65 - 105 mg/dL Final    POC Glucose 10/05/2022 249 (A)  65 - 105 mg/dL Final    POC Glucose 10/05/2022 384 (A)  65 - 105 mg/dL Final         Reviewed patient's current plan of care and vital signs with nursing staff.     Labs reviewed: [x] Yes  , K5.6, EST going for rate 57, POC glucose 38, Creat 1.14  Medications  Current Facility-Administered Medications: insulin lispro (HUMALOG) injection vial 0-16 Units, 0-16 Units, SubCUTAneous, TID WC  metFORMIN (GLUCOPHAGE) tablet 850 mg, 850 mg, Oral, BID WC  albuterol sulfate HFA (PROVENTIL;VENTOLIN;PROAIR) 108 (90 Base) MCG/ACT inhaler 2 puff, 2 puff, Inhalation, Q4H PRN  insulin glargine (LANTUS) injection vial 30 Units, 30 Units, SubCUTAneous, BID  albuterol (PROVENTIL) nebulizer solution 2.5 mg, 2.5 mg, Nebulization, Q6H PRN  rivastigmine (EXELON) 4.6 MG/24HR 1 patch, 1 patch, TransDERmal, Daily  lidocaine 4 % external patch 1 patch, 1 patch, TransDERmal, Daily  atorvastatin (LIPITOR) tablet 40 mg, 40 mg, Oral, Nightly  glucagon (rDNA) injection 1 mg, 1 mg, SubCUTAneous, PRN  glucose chewable tablet 16 g, 4 tablet, Oral, PRN  OLANZapine zydis (ZYPREXA) disintegrating tablet 15 mg, 15 mg, Oral, Nightly  pantoprazole (PROTONIX) tablet 40 mg, 40 mg, Oral, BID  pregabalin (LYRICA) capsule 75 mg, 75 mg, Oral, BID  acetaminophen (TYLENOL) tablet 650 mg, 650 mg, Oral, Q6H PRN  ibuprofen (ADVIL;MOTRIN) tablet 400 mg, 400 mg, Oral, Q6H PRN  hydrOXYzine HCl (ATARAX) tablet 50 mg, 50 mg, Oral, TID PRN  traZODone (DESYREL) tablet 50 mg, 50 mg, Oral, Nightly PRN  polyethylene glycol (GLYCOLAX) packet 17 g, 17 g, Oral, Daily PRN  aluminum & magnesium hydroxide-simethicone (MAALOX) 200-200-20 MG/5ML suspension 30 mL, 30 mL, Oral, Q6H PRN  nicotine polacrilex (NICORETTE) gum 2 mg, 2 mg, Oral, Q2H PRN  haloperidol lactate (HALDOL) injection 5 mg, 5 mg, IntraMUSCular, Q6H PRN **AND** LORazepam (ATIVAN) injection 2 mg, 2 mg, IntraMUSCular, Q6H PRN **AND** diphenhydrAMINE (BENADRYL) injection 50 mg, 50 mg, IntraMUSCular, Q6H PRN    ASSESSMENT  Unspecified psychosis not due to a substance or known physiological condition [facility])         HANDOFF  Patient symptoms are: Unstable  Medications per attending physician: Continue current medication and monitor. Continue encouragement    Internal medicine consult continues: Hyperglycemia, K 5.6, , Creat 1.14 (trending up)  RN informed of treatment plan and results  Encourage participation in groups and milieu. Probable discharge is to be determined by MD    Electronically signed by LAILA Lara CNP on 10/5/2022 at 2:12 PM  She is discharge focused. She continues to have poor insight into her cognitive difficulties    I independently saw and evaluated the patient. I reviewed the nurse practioner's documentation above. Any additional comments or changes to the    documentation are stated below otherwise agree with assessment. The patient continues to be intermittently hostile. She is discharge focused and has poor insight. She states she can go to a friend's place but it is not clear who this friend is aware that live    PLAN  Medications as noted above  Attempt to develop insight  Expected Length of Stay is 3-5 days. Psycho-education conducted.   Supportive Therapy conducted.   Follow-up daily while on inpatient unit    Electronically signed by Annia Palmer MD on 10/5/22 at 7:09 PM EDT

## 2022-10-06 LAB
ANION GAP SERPL CALCULATED.3IONS-SCNC: 8 MMOL/L (ref 9–17)
BUN BLDV-MCNC: 16 MG/DL (ref 6–20)
CALCIUM SERPL-MCNC: 8.7 MG/DL (ref 8.6–10.4)
CHLORIDE BLD-SCNC: 105 MMOL/L (ref 98–107)
CO2: 26 MMOL/L (ref 20–31)
CREAT SERPL-MCNC: 0.89 MG/DL (ref 0.5–0.9)
ESTIMATED AVERAGE GLUCOSE: 183 MG/DL
GFR SERPL CREATININE-BSD FRML MDRD: >60 ML/MIN/1.73M2
GLUCOSE BLD-MCNC: 209 MG/DL (ref 65–105)
GLUCOSE BLD-MCNC: 227 MG/DL (ref 70–99)
GLUCOSE BLD-MCNC: 286 MG/DL (ref 65–105)
GLUCOSE BLD-MCNC: 286 MG/DL (ref 65–105)
GLUCOSE BLD-MCNC: 319 MG/DL (ref 65–105)
HBA1C MFR BLD: 8 % (ref 4–6)
POTASSIUM SERPL-SCNC: 4.6 MMOL/L (ref 3.7–5.3)
SODIUM BLD-SCNC: 139 MMOL/L (ref 135–144)

## 2022-10-06 PROCEDURE — 6370000000 HC RX 637 (ALT 250 FOR IP): Performed by: INTERNAL MEDICINE

## 2022-10-06 PROCEDURE — 97116 GAIT TRAINING THERAPY: CPT

## 2022-10-06 PROCEDURE — 99232 SBSQ HOSP IP/OBS MODERATE 35: CPT | Performed by: PSYCHIATRY & NEUROLOGY

## 2022-10-06 PROCEDURE — 6370000000 HC RX 637 (ALT 250 FOR IP): Performed by: NURSE PRACTITIONER

## 2022-10-06 PROCEDURE — 6370000000 HC RX 637 (ALT 250 FOR IP): Performed by: PSYCHIATRY & NEUROLOGY

## 2022-10-06 PROCEDURE — 99232 SBSQ HOSP IP/OBS MODERATE 35: CPT | Performed by: INTERNAL MEDICINE

## 2022-10-06 PROCEDURE — 82947 ASSAY GLUCOSE BLOOD QUANT: CPT

## 2022-10-06 PROCEDURE — 1240000000 HC EMOTIONAL WELLNESS R&B

## 2022-10-06 PROCEDURE — 97530 THERAPEUTIC ACTIVITIES: CPT

## 2022-10-06 PROCEDURE — 80048 BASIC METABOLIC PNL TOTAL CA: CPT

## 2022-10-06 PROCEDURE — APPSS30 APP SPLIT SHARED TIME 16-30 MINUTES: Performed by: NURSE PRACTITIONER

## 2022-10-06 PROCEDURE — 36415 COLL VENOUS BLD VENIPUNCTURE: CPT

## 2022-10-06 PROCEDURE — 83036 HEMOGLOBIN GLYCOSYLATED A1C: CPT

## 2022-10-06 RX ORDER — INSULIN LISPRO 100 [IU]/ML
5 INJECTION, SOLUTION INTRAVENOUS; SUBCUTANEOUS ONCE
Status: COMPLETED | OUTPATIENT
Start: 2022-10-06 | End: 2022-10-06

## 2022-10-06 RX ORDER — INSULIN GLARGINE 100 [IU]/ML
34 INJECTION, SOLUTION SUBCUTANEOUS 2 TIMES DAILY
Status: DISCONTINUED | OUTPATIENT
Start: 2022-10-06 | End: 2022-10-07

## 2022-10-06 RX ADMIN — INSULIN LISPRO 8 UNITS: 100 INJECTION, SOLUTION INTRAVENOUS; SUBCUTANEOUS at 11:42

## 2022-10-06 RX ADMIN — ACETAMINOPHEN 650 MG: 325 TABLET, FILM COATED ORAL at 08:31

## 2022-10-06 RX ADMIN — OLANZAPINE 15 MG: 10 TABLET, ORALLY DISINTEGRATING ORAL at 20:29

## 2022-10-06 RX ADMIN — PREGABALIN 75 MG: 75 CAPSULE ORAL at 08:25

## 2022-10-06 RX ADMIN — TRAZODONE HYDROCHLORIDE 50 MG: 50 TABLET ORAL at 20:53

## 2022-10-06 RX ADMIN — METFORMIN HYDROCHLORIDE 850 MG: 850 TABLET ORAL at 17:43

## 2022-10-06 RX ADMIN — INSULIN LISPRO 8 UNITS: 100 INJECTION, SOLUTION INTRAVENOUS; SUBCUTANEOUS at 17:43

## 2022-10-06 RX ADMIN — IBUPROFEN 400 MG: 400 TABLET ORAL at 15:06

## 2022-10-06 RX ADMIN — INSULIN GLARGINE 34 UNITS: 100 INJECTION, SOLUTION SUBCUTANEOUS at 20:29

## 2022-10-06 RX ADMIN — HYDROXYZINE HYDROCHLORIDE 50 MG: 50 TABLET ORAL at 17:50

## 2022-10-06 RX ADMIN — ATORVASTATIN CALCIUM 40 MG: 20 TABLET, FILM COATED ORAL at 20:30

## 2022-10-06 RX ADMIN — IBUPROFEN 400 MG: 400 TABLET ORAL at 20:30

## 2022-10-06 RX ADMIN — PANTOPRAZOLE SODIUM 40 MG: 40 TABLET, DELAYED RELEASE ORAL at 20:30

## 2022-10-06 RX ADMIN — PANTOPRAZOLE SODIUM 40 MG: 40 TABLET, DELAYED RELEASE ORAL at 08:25

## 2022-10-06 RX ADMIN — INSULIN LISPRO 5 UNITS: 100 INJECTION, SOLUTION INTRAVENOUS; SUBCUTANEOUS at 20:29

## 2022-10-06 RX ADMIN — ACETAMINOPHEN 650 MG: 325 TABLET, FILM COATED ORAL at 18:41

## 2022-10-06 RX ADMIN — PREGABALIN 75 MG: 75 CAPSULE ORAL at 20:30

## 2022-10-06 RX ADMIN — INSULIN LISPRO 4 UNITS: 100 INJECTION, SOLUTION INTRAVENOUS; SUBCUTANEOUS at 08:24

## 2022-10-06 RX ADMIN — ALBUTEROL SULFATE 2 PUFF: 90 AEROSOL, METERED RESPIRATORY (INHALATION) at 22:34

## 2022-10-06 RX ADMIN — INSULIN GLARGINE 30 UNITS: 100 INJECTION, SOLUTION SUBCUTANEOUS at 08:25

## 2022-10-06 ASSESSMENT — PAIN DESCRIPTION - DESCRIPTORS: DESCRIPTORS: SHARP

## 2022-10-06 ASSESSMENT — PAIN SCALES - GENERAL
PAINLEVEL_OUTOF10: 10
PAINLEVEL_OUTOF10: 4
PAINLEVEL_OUTOF10: 5
PAINLEVEL_OUTOF10: 10

## 2022-10-06 ASSESSMENT — PAIN DESCRIPTION - ORIENTATION: ORIENTATION: LEFT;RIGHT

## 2022-10-06 ASSESSMENT — PAIN DESCRIPTION - LOCATION
LOCATION: LEG
LOCATION: LEG

## 2022-10-06 ASSESSMENT — PAIN - FUNCTIONAL ASSESSMENT: PAIN_FUNCTIONAL_ASSESSMENT: PREVENTS OR INTERFERES SOME ACTIVE ACTIVITIES AND ADLS

## 2022-10-06 NOTE — PROGRESS NOTES
Physical Therapy  Facility/Department: [facility]  Daily Treatment Note  NAME: Isiah Solomon  : 1965  MRN: [id number]    Date of Service: 10/6/2022    Discharge Recommendations:  Patient would benefit from continued therapy after discharge   PT Equipment Recommendations  Equipment Needed: No    Patient Diagnosis(es): Diagnoses of Other hyperlipidemia and Diabetes mellitus type 2 in [facility]) were pertinent to this visit. Assessment   Activity Tolerance: Treatment limited secondary to agitation  Equipment Needed: No     Plan    Physcial Therapy Plan  General Plan: 2-3 times per week  Specific Instructions for Next Treatment: gait, balance, therex  Current Treatment Recommendations: Strengthening;Balance training;Transfer training;Functional mobility training; Endurance training;Gait training;Equipment evaluation, education, & procurement;Patient/Caregiver education & training; Safety education & training; Therapeutic activities     Restrictions  Restrictions/Precautions  Restrictions/Precautions: Fall Risk (reports \"falling alot\")  Required Braces or Orthoses?: No  Position Activity Restriction  Other position/activity restrictions: PT eval and treat     Subjective    Subjective  Subjective: Pt is in the common area upon arrival. Agreeable to therapy in her room. Pt demos  propeling herself in her w/c towards her room and got her left thumb stuck on the wheel. Pt reports increase pain. RN assessed pt and agreeable to PT. Pain: Pt reports pain, does not rate  Orientation  Overall Orientation Status: Within Functional Limits  Orientation Level: Oriented to time (pt seems aware of her name and that she is in the hospital.  pt demo awareness this is a weekend stating \" Don't come here on Sat and  and ask me to do it. \"  (therapy))  Cognition  Overall Cognitive Status: Exceptions  Arousal/Alertness: Appropriate responses to stimuli  Following Commands: Follows one step commands consistently; Follows one step bed?: A Little  How much help from another person moving to and from a bed to a chair?: A Little  How much help from another person needed to walk in hospital room?: A Little  How much help from another person for climbing 3-5 steps with a railing?: A Little  AM-PAC Inpatient Mobility Raw Score : 18  AM-PAC Inpatient T-Scale Score : 43.63  Mobility Inpatient CMS 0-100% Score: 46.58  Mobility Inpatient CMS G-Code Modifier : CK    Goals  Short Term Goals  Time Frame for Short Term Goals: 5 days  Short Term Goal 1: Pt to demo bed mobility IND. Short Term Goal 2: Pt to perform transfers MOD I with good technique. Short Term Goal 3: Pt to amb 100' with device SBA. Short Term Goal 4: Pt to improve BLE strength by 1/2 MMG. Short Term Goal 5: Pt to improve standing balance to GOOD- to reduce fall risk. Patient Goals   Patient Goals : To go home    Education  Patient Education  Education Given To: Patient  Education Provided: Role of Therapy;Plan of Care;Precautions;Transfer Training;Equipment; Fall Prevention Strategies  Education Method: Demonstration;Verbal  Education Outcome: Continued education needed    Therapy Time   Individual Concurrent Group Co-treatment   Time In 5766         Time Out 1523         Minutes [place], [place]

## 2022-10-06 NOTE — PROGRESS NOTES
[facility]    Progress note             Date:   10/6/2022  Patient name:  Ray Veloz  Date of admission:  9/22/2022  3:40 PM  MRN:   [id number]  Account:  [de-identified]  YOB: 1965  PCP:    Art Al MD  Room:   [facility]  Code Status:    Full Code    Physician Requesting Consult: Stanley Carcmao MD    Reason for Consult: History and physical, medical management    Chief Complaint:     No chief complaint on file. Medical management    History Obtained From:     Patient, EMR, nursing staff    History of Present Illness:     42-year-old female admitted for acute psychotic state    She was initially admitted to medical unit after being found unresponsive at her home, treated for DKA with IV insulin, acute renal failure with substantial hydration, and hypovolemic shock requiring Levophed, severe hypernatremia >170, concern for GI bleed status post EGD which showed multiple duodenal ulcers treated with PPI.   Patient remained confused and delusional, CT head negative, MRI could not be done due to lack of patient cooperation      Medical history includes asthma, hypertension, stroke, type 2 diabetes, polysubstance abuse, recent ischemic stroke  Past Medical History:     Past Medical History:   Diagnosis Date    Acid reflux 5/29/2017    Acute cystitis with hematuria 10/11/2016    Acute non-recurrent maxillary sinusitis 11/28/2017    Asthma     Bipolar 1 disorder ([address] 1/4/2018    Bipolar disorder, mixed ([address]     BMI 34.0-34.9,adult 11/28/2017    Cannabis use disorder, severe, dependence ([address] 12/19/2017    Cerebrovascular accident (CVA) ([address] 6/14/2017    Chest pain 11/5/2016    Chronic renal insufficiency     Cocaine abuse ([address] 1/5/2018    COVID-19 virus RNA test result unknown     DDD (degenerative disc disease), cervical     Diabetes mellitus ([address]     Dizziness 6/13/2017    Fibromyalgia     History of stroke 9/9/2017    Homicidal ideation 11/6/2017    Hyperglycemia     Hypertension     Hypotension 1/18/2019    IDDM (insulin dependent diabetes mellitus) 12/21/2015    Lupus ([address]     Migraine     Neuropathy     Neuropathy     Polysubstance abuse ([address] 9/17/2017    Post traumatic stress disorder (PTSD)     Posttraumatic stress disorder 5/29/2017    Recurrent depression ([address] 5/29/2017    Recurrent major depressive disorder, in partial remission ([address] 11/28/2017    Screening mammogram, encounter for 11/28/2017    Severe recurrent major depression with psychotic features ([address] 12/19/2017    Severe recurrent major depression without psychotic features ([address] 12/19/2017    Stroke (cerebrum) ([address] 6/14/2017    Stroke ([address]     per chart notes    Suicidal ideation     Suicidal intent 3/10/2017    Vitamin D deficiency 11/28/2017    White matter changes 6/13/2017        Past Surgical History:     Past Surgical History:   Procedure Laterality Date    ABDOMEN SURGERY      drain tube    ABSCESS DRAINAGE      right buttock    CATARACT REMOVAL WITH IMPLANT Bilateral     CHEST TUBE INSERTION      FINGER AMPUTATION Left 03/13/2021    LEFT RING FINER AMPUTATION performed by Bee Gonzales MD at [address] [facility] Right 10/11/2021    AMPUTATION RIGHT INDEX FINGER performed by Bee Gonzales MD at [facility] Right 1/27/2022    FOOT ABSCESS INCISION AND DRAINAGE  (PULSE LAVAGE, CULTURE SWABS) performed by Kristan Guzmán DPM at [place] [address] Right 01/27/2022    FOOT ABSCESS INCISION AND DRAINAGE (PULSE LAVAGE, CULTURE SWABS) - Right    HAND SURGERY Right 09/14/2021    I&D INDEX FINGER performed by Bee Gonzales MD at [facility] Right 09/15/2021    I&D INDEX FINGER performed by Bee Gonzales MD at [address]  2/3/2022         LASIK Bilateral     UPPER GASTROINTESTINAL ENDOSCOPY N/A 9/16/2022    EGD BIOPSY performed by Jc Pennington MD at [facility] Prior to Admission:     Prior to Admission medications    Medication Sig Start Date End Date Taking? Authorizing Provider   atorvastatin (LIPITOR) 40 MG tablet Take 1 tablet by mouth nightly 9/27/22 12/26/22 Yes Isidra Daily MD   blood glucose monitor strips Test 3 times a day & as needed for symptoms of irregular blood glucose. Dispense sufficient amount for indicated testing frequency plus additional to accommodate PRN testing needs. 9/27/22  Yes Isidra Daily MD   metFORMIN (GLUCOPHAGE) 1000 MG tablet Take 1 tablet by mouth 2 times daily (with meals) 9/27/22  Yes Isidra Daily MD   traZODone (DESYREL) 50 MG tablet Take 1 tablet by mouth nightly as needed for Sleep 9/27/22  Yes Isidra Daily MD   pantoprazole (PROTONIX) 40 MG tablet Take 1 tablet by mouth 2 times daily 9/27/22  Yes Isidra Daily MD   OLANZapine (ZYPREXA) 10 MG tablet Take 1.5 tablets by mouth nightly 9/27/22  Yes Isidra Daily MD   insulin glargine (LANTUS SOLOSTAR) 100 UNIT/ML injection pen Inject 30 Units into the skin 2 times daily 4/22/22   Romina Beach MD   fluticasone (FLONASE) 50 MCG/ACT nasal spray 1 spray by Each Nostril route daily 3/15/22   Romina Beach MD   Alcohol Swabs 70 % PADS Use 1 swab 3 times daily as needed 3/15/22   Romina Beach MD   Lancets MISC 1 each by Does not apply route 3 times daily 3/15/22   Romina Beach MD   Insulin Pen Needle ([employer] PEN NEEDLES 31G) 31G X 8 MM MISC 1 each by Does not apply route daily 3/15/22   Romina Baech MD   budesonide-formoterol (SYMBICORT) 80-4.5 MCG/ACT AERO Inhale 2 puffs into the lungs 2 times daily 3/15/22   Romina Beach MD   albuterol sulfate  (90 Base) MCG/ACT inhaler Inhale 2 puffs into the lungs every 4 hours as needed for Wheezing 3/15/22 4/15/22  Romina Beach MD   Misc.  Devices MISC 1 PAIR OF DIABETIC SHOES (1 LEFT/ 1 RIGHT)  1-3 PAIRS OF INSERTS (LEFT/ RIGHT) 2/15/22   Margarita Godwin DPM        Allergies:     Bactrim [sulfamethoxazole-trimethoprim] and Adhesive tape    Social History:     Tobacco:    reports that she has never smoked. She has never used smokeless tobacco.  Alcohol:      reports that she does not currently use alcohol. Drug Use:  reports current drug use. Drugs: Marijuana (Weed) and Cocaine. Family History:     Family History   Problem Relation Age of Onset    Diabetes Mother     Stroke Mother     Diabetes Father     Diabetes Sister     Diabetes Brother     Other Son         GSW    No Known Problems Sister        Review of Systems:     Positive and Negative as described in HPI. Denies any shortness of breath or cough  Denies chest pain or palpitations  Denies diarrhea vomiting. Reports mild epigastric pain  Denies any new numbness tremors or weakness. 10 point review of systems was negative other than mentioned above    Physical Exam:     BP (!) 100/59   Pulse 95   Temp 98.1 °F (36.7 °C) (Oral)   Resp 18   Ht 5' 5\" (1.651 m)   Wt 207 lb (93.9 kg)   LMP  (LMP Unknown)   PF (!) 14 L/min   BMI 34.45 kg/m²   Temp (24hrs), Av.1 °F (36.7 °C), Min:98.1 °F (36.7 °C), Max:98.1 °F (36.7 °C)    Recent Labs     10/05/22  1657 10/05/22  1953 10/06/22  0738 10/06/22  1136   POCGLU 223* 304* 209* 286*       No intake or output data in the 24 hours ending 10/06/22 1306    General Appearance:  alert, well appearing, and in no acute distress  Head:  normocephalic, atraumatic. Eye: no icterus, redness, pupils equal and reactive, extraocular eye movements intact, conjunctiva clear  Ear: normal external ear, no discharge, hearing intact  Nose:  no drainage noted  Mouth: mucous membranes moist  Neck: supple, no carotid bruits, thyroid not palpable  Lungs: Bilateral equal air entry, clear to ausculation, no wheezing, rales or rhonchi, normal effort  Cardiovascular: normal rate, regular rhythm, no murmur, gallop, rub.   Abdomen: Soft, nontender, nondistended, normal bowel sounds, no hepatomegaly or splenomegaly  Neurologic: difficult to assess, limited pt co-operation. Alert, oriented X 1-2, mobile, power equal all 4 limibs. Normal speech but abnormal content, confabulation present.   No focal neurology otherwise  Skin: No gross lesions, rashes, bruising or bleeding on exposed skin area  Extremities:  No joint swelling, no pedal edema or calf pain with palpation      Investigations:      Laboratory Testing:  Recent Results (from the past 24 hour(s))   POC Glucose Fingerstick    Collection Time: 10/05/22  4:57 PM   Result Value Ref Range    POC Glucose 223 (H) 65 - 105 mg/dL   POC Glucose Fingerstick    Collection Time: 10/05/22  7:53 PM   Result Value Ref Range    POC Glucose 304 (H) 65 - 105 mg/dL   Basic Metabolic Panel    Collection Time: 10/06/22  7:18 AM   Result Value Ref Range    Glucose 227 (H) 70 - 99 mg/dL    BUN 16 6 - 20 mg/dL    Creatinine 0.89 0.50 - 0.90 mg/dL    Est, Glom Filt Rate >60 >60 mL/min/1.73m2    Calcium 8.7 8.6 - 10.4 mg/dL    Sodium 139 135 - 144 mmol/L    Potassium 4.6 3.7 - 5.3 mmol/L    Chloride 105 98 - 107 mmol/L    CO2 26 20 - 31 mmol/L    Anion Gap 8 (L) 9 - 17 mmol/L   POC Glucose Fingerstick    Collection Time: 10/06/22  7:38 AM   Result Value Ref Range    POC Glucose 209 (H) 65 - 105 mg/dL   POC Glucose Fingerstick    Collection Time: 10/06/22 11:36 AM   Result Value Ref Range    POC Glucose 286 (H) 65 - 105 mg/dL       Imaging/Diagonstics:  Recent data reviewed    Assessment :      Primary Problem  Unspecified psychosis not due to a substance or known physiological condition [facility])    Active Hospital Problems    Diagnosis Date Noted    Unsp psychosis not due to a substance or known physiol cond ([address] [F29] 09/28/2022     Priority: Medium    Fentanyl use disorder, mild ([address] [F11.10] 09/23/2022     Priority: Medium    Unspecified psychosis not due to a substance or known physiological condition ([address] [F29] 09/22/2022     Priority: Medium    Cocaine use disorder ([address] [F14.10] 01/05/2018       Plan:     Medications: Allergies: Allergies   Allergen Reactions    Bactrim [Sulfamethoxazole-Trimethoprim] Swelling    Adhesive Tape Rash       Current Meds:   Scheduled Meds:    insulin lispro  0-16 Units SubCUTAneous TID WC    insulin glargine  30 Units SubCUTAneous BID    rivastigmine  1 patch TransDERmal Daily    lidocaine  1 patch TransDERmal Daily    atorvastatin  40 mg Oral Nightly    OLANZapine zydis  15 mg Oral Nightly    pantoprazole  40 mg Oral BID    pregabalin  75 mg Oral BID     Continuous Infusions:   PRN Meds: albuterol sulfate HFA, albuterol, glucagon (rDNA), glucose, acetaminophen, ibuprofen, hydrOXYzine HCl, traZODone, polyethylene glycol, aluminum & magnesium hydroxide-simethicone, nicotine polacrilex, haloperidol lactate **AND** LORazepam **AND** diphenhydrAMINE    Reason for consult: General medical management     Acute psychosis - management per psych-  DKA- resolved  Type 2 DM- BG lantus, SSI- BG controlled, resume statin, metformin  Acute renal failure- creat normalized  Acute toxic encephalopathy- still encephalopathic  Hypertension- currently off meds- BP controlled  Upper GI bleed, duodenal ulcers-  c/w PPI  Recent stroke- off ASA due to recent GI bleed- Hb 7.6 last, will repeat    Repeat labs ordered, including HIV and hep C per patient request    DVT prophylaxis-not required, patient is mobile    10/4/22    Poor control dm  Recent Labs     10/05/22  1202 10/05/22  1657 10/05/22  1953 10/06/22  0738 10/06/22  1136   POCGLU 384* 223* 304* 209* 286*         Last creatinine 1.05            Was on metformin 1000 mg bid   Will resume 500 bid   Resume home dose 30 units  bid with meals . At present 17 units bid   Monitor            10/6/22    Blood sugars improved    Last hba1c 11.4  Lantus increased to 34 units   Metformin 850 bid   On high dose sliding scale . Poor control The current medical regimen is effective;  continue present plan and medications.  To titrate           Consultations:   Ximena Ulrich MD  10/6/2022  1:06 PM    Copy sent to Isaac Hernandez MD    Please note that this chart was generated using voice recognition Dragon dictation software. Although every effort was made to ensure the accuracy of this automated transcription, some errors in transcription may have occurred.

## 2022-10-06 NOTE — GROUP NOTE
Group Therapy Note    Date: 10/5/2022    Group Start Time: 2030  Group End Time: 2100  Group Topic: Wrap-Up    [facility]    Arcenio Arias LPN        Group Therapy Note    Attendees: 10/15       Participation Level:  Active Listener    Participation Quality: Appropriate      Speech:  normal      Thought Process/Content: Logical      Affective Functioning: Congruent      Mood: normal      Level of consciousness:  Alert      Response to Learning: Able to verbalize current knowledge/experience      Endings: None Reported    Modes of Intervention: Support      Discipline Responsible: Licensed Practical Nurse      Signature:  Arcenio Arias LPN

## 2022-10-06 NOTE — GROUP NOTE
Group Therapy Note    Date: 10/6/2022    Group Start Time: 1100  Group End Time: 3932  Group Topic: Relaxation    [facility]    CHRISTINA Betancourt        Group Therapy Note    Attendees: 9/16       Patient's Goal:  to improve relaxation using art       Status After Intervention:  Improved    Participation Level:  Active Listener and Interactive    Participation Quality: Appropriate      Speech:  normal      Thought Process/Content: Logical      Affective Functioning: Flat      Mood: depressed      Level of consciousness:  Alert      Response to Learning: Able to verbalize current knowledge/experience and Progressing to goal      Endings: None Reported    Modes of Intervention: Education, Support, and Activity      Discipline Responsible: Psychoeducational Specialist      Signature:  Loli Torrez

## 2022-10-06 NOTE — BH NOTE
Safety drill completed on unit. All areas of unit checked for contraband. No safety concerns expressed by patients.

## 2022-10-06 NOTE — GROUP NOTE
Group Therapy Note    Date: 10/6/2022    Group Start Time: 1330  Group End Time: 9801  Group Topic: Cognitive Skills    [facility] CHRISTINA Scruggs    Cognitive Skills Group Note        Date: October 6, 2022 Start Time: 1:30pm  End Time:  2:20pm      Number of Participants in Group & Unit Census:  4/14    Topic: cognitive skills     Goal of Group: to improve concentration / improve coping skills       Comments:     Patient did not participate in Cognitive Skills group, despite staff encouragement and explanation of benefits. Patient remain seclusive to self. Q15 minute safety checks maintained for patient safety and will continue to encourage patient to attend unit programming.               Signature:  Mari Cook

## 2022-10-06 NOTE — GROUP NOTE
Group Therapy Note    Date: 10/6/2022    Group Start Time: 0900  Group End Time: 0362  Group Topic: Community Meeting    DANIELLE Unger        Group Therapy Note    Attendees: 8/16       Patient's Goal:  Stay up and out of bed    Notes:  controlled    Status After Intervention:  Unchanged    Participation Level: Active Listener    Participation Quality: Appropriate      Speech:  normal      Thought Process/Content: Logical      Affective Functioning: Congruent      Mood:  stable      Level of consciousness:  Oriented x4      Response to Learning: Able to verbalize current knowledge/experience and Progressing to goal      Endings: None Reported    Modes of Intervention: Education, Support, and Socialization      Discipline Responsible: Behavorial Health Tech      Signature:   Jonathan Unger

## 2022-10-06 NOTE — CARE COORDINATION
SW received phone call from Caroline Morataya at [facility].  They are requesting a on site visit to evaluate patient on 10/7 between 9 or 930a

## 2022-10-06 NOTE — PLAN OF CARE
Problem: Anxiety  Goal: Will report anxiety at manageable levels  Description: INTERVENTIONS:  1. Administer medication as ordered  2. Teach and rehearse alternative coping skills  3. Provide emotional support with 1:1 interaction with staff  10/5/2022 2314 by Rissa Day LPN  Outcome: Progressing     Problem: Pain  Goal: Verbalizes/displays adequate comfort level or baseline comfort level  10/5/2022 2314 by Rissa Day LPN  Outcome: Progressing     Problem: Self Harm/Suicidality  Goal: Will have no self-injury during hospital stay  Description: INTERVENTIONS:  1. Q 30 MINUTES: Routine safety checks  2. Q SHIFT & PRN: Assess risk to determine if routine checks are adequate to maintain patient safety  10/5/2022 2314 by Rissa Day LPN  Outcome: Progressing     Patient denies any thoughts or plans or plans to harm herself or anyone else. She reported being, anxious, depressed, Having trouble sleeping and pain in her lower extremities. Prn Trazodone , Atarax and Tylenol was given per patients request. Patient became slightly irritable during redirection x 2 regarding 2100 medication administration. Lidocaine patch was removed and patient is being monitored Q 15 minutes for safety. I will continue to monitor and provide for a safe and therapeutic environment.

## 2022-10-06 NOTE — PROGRESS NOTES
Daily Progress Note  10/6/2022    Patient Name: Perla Rangel: Acute psychosis         SUBJECTIVE:    Patient was seen for follow-up assessment today. She has been medication adherent and behaviorally in control. She has not required any emergency medications in the last 24 hours. Nursing staff report patient has been intermittently irritable but mostly redirectable. Patient was seated in the day area watching TV prior to assessment. Recreational therapy group had just commenced and she declined offer to join. Patient expressed irritability and hostility but apologized to writer and stated it was not directed at them. She remains very discharge focused and feels that there is nothing here that can be helpful to her. She expressed being \"tired of always being asked the same questions\". She expresses that being inpatient at this facility is not helpful to her mood. She is denying suicidal and homicidal ideation and states to writer \"why would I do that\". She denies auditory and visual hallucinations. She is hopeful for discharge soon. Appetite:  [x] Adequate/Unchanged  [] Increased  [] Decreased      Sleep:       [x] Adequate/Unchanged  [] Fair  [] Poor      Group Attendance on Unit:   [] Yes   [x] Selectively    [] No    Medication Side Effects: Denies          Mental Status Exam  Level of consciousness: Alert and awake   Appearance: Appropriate attire for setting, seated in wheelchair, with fair  grooming and hygiene   Behavior/Motor: Approachable, calm  Attitude toward examiner: Uncooperative, inattentive, fair eye contact  Speech: Spontaneous, interrupting, normal volume, irritable tone  Mood: \"Fine\"  Affect: Blunted  Thought processes: Mostly coherent, somewhat linear, perseverative  Thought content: Denies homicidal ideation  Suicidal Ideation: Denies  Delusions: No evidence of delusions.    Perceptual Disturbance: Patient does not appear to be responding to internal stimuli; denies AVH  Cognition: Oriented to self, location, and disorganized to time and situation  Memory: impaired  Insight: poor   Judgement: poor     Data   height is 5' 5\" (1.651 m) and weight is 207 lb (93.9 kg). Her oral temperature is 98.1 °F (36.7 °C). Her blood pressure is 100/59 (abnormal) and her pulse is 95. Her respiration is 18.    Labs:   Admission on 09/22/2022   Component Date Value Ref Range Status    POC Glucose 09/22/2022 135 (A)  65 - 105 mg/dL Final    POC Glucose 09/23/2022 168 (A)  65 - 105 mg/dL Final    WBC 09/23/2022 5.3  3.5 - 11.0 k/uL Final    RBC 09/23/2022 2.72 (A)  4.0 - 5.2 m/uL Final    Hemoglobin 09/23/2022 8.6 (A)  12.0 - 16.0 g/dL Final    Hematocrit 09/23/2022 26.8 (A)  36 - 46 % Final    MCV 09/23/2022 98.8  80 - 100 fL Final    MCH 09/23/2022 31.8  26 - 34 pg Final    MCHC 09/23/2022 32.2  31 - 37 g/dL Final    RDW 09/23/2022 14.9  11.5 - 14.9 % Final    Platelets 24/55/2971 434  150 - 450 k/uL Final    MPV 09/23/2022 7.8  6.0 - 12.0 fL Final    Glucose 09/23/2022 315 (A)  70 - 99 mg/dL Final    BUN 09/23/2022 13  6 - 20 mg/dL Final    Creatinine 09/23/2022 0.78  0.50 - 0.90 mg/dL Final    Calcium 09/23/2022 8.7  8.6 - 10.4 mg/dL Final    Sodium 09/23/2022 135  135 - 144 mmol/L Final    Potassium 09/23/2022 4.8  3.7 - 5.3 mmol/L Final    Chloride 09/23/2022 101  98 - 107 mmol/L Final    CO2 09/23/2022 24  20 - 31 mmol/L Final    Anion Gap 09/23/2022 10  9 - 17 mmol/L Final    Alkaline Phosphatase 09/23/2022 243 (A)  35 - 104 U/L Final    ALT 09/23/2022 10  5 - 33 U/L Final    AST 09/23/2022 8  <32 U/L Final    Total Bilirubin 09/23/2022 0.2 (A)  0.3 - 1.2 mg/dL Final    Total Protein 09/23/2022 6.8  6.4 - 8.3 g/dL Final    Albumin 09/23/2022 2.7 (A)  3.5 - 5.2 g/dL Final    GFR Non- 09/23/2022 >60  >60 mL/min Final    GFR  09/23/2022 >60  >60 mL/min Final    GFR Comment 09/23/2022        Final    Comment: Average GFR for 52-63 years old:   80 mL/min/1.73sq m  Chronic Kidney Disease:   <60 mL/min/1.73sq m  Kidney failure:   <15 mL/min/1.73sq m              eGFR calculated using average adult body mass. Additional eGFR calculator available at:        [employer]            POC Glucose 09/23/2022 222 (A)  65 - 105 mg/dL Final    POC Glucose 09/23/2022 286 (A)  65 - 105 mg/dL Final    POC Glucose 09/23/2022 237 (A)  65 - 105 mg/dL Final    POC Glucose 09/24/2022 185 (A)  65 - 105 mg/dL Final    POC Glucose 09/24/2022 177 (A)  65 - 105 mg/dL Final    POC Glucose 09/24/2022 241 (A)  65 - 105 mg/dL Final    POC Glucose 09/24/2022 221 (A)  65 - 105 mg/dL Final    POC Glucose 09/24/2022 248 (A)  65 - 105 mg/dL Final    Hepatitis C Ab 09/26/2022 NONREACTIVE  NONREACTIVE Final    Comment:       The hepatitis C procedure used in our laboratory is a Chemiluminescent test specific for   three recombinant HCV antigens. A negative anti-HCV result indicates that the antibodies to   hepatitis C virus are not present at this time. Individuals with reactive anti-HCV should be considered infected and infectious until proven   otherwise. Confirmation of all equivocal or reactive results is recommended by ordering   HCV RNA by PCR. POC Glucose 09/25/2022 142 (A)  65 - 105 mg/dL Final    POC Glucose 09/25/2022 204 (A)  65 - 105 mg/dL Final    POC Glucose 09/25/2022 255 (A)  65 - 105 mg/dL Final    POC Glucose 09/25/2022 224 (A)  65 - 105 mg/dL Final    HIV Ag/Ab 09/26/2022 NONREACTIVE  NONREACTIVE Final    Comment: No laboratory evidence of HIV infection. If acute HIV infection is suspected, consider   testing for HIV-1 RNA.       POC Glucose 09/26/2022 140 (A)  65 - 105 mg/dL Final    POC Glucose 09/26/2022 248 (A)  65 - 105 mg/dL Final    POC Glucose 09/26/2022 279 (A)  65 - 105 mg/dL Final    POC Glucose 09/26/2022 331 (A)  65 - 105 mg/dL Final    POC Glucose 09/27/2022 196 (A)  65 - 105 mg/dL Final    POC Glucose 09/27/2022 227 (A)  65 - 105 mg/dL Final    POC Glucose 09/27/2022 311 (A)  65 - 105 mg/dL Final    POC Glucose 09/27/2022 295 (A)  65 - 105 mg/dL Final    POC Glucose 09/27/2022 301 (A)  65 - 105 mg/dL Final    POC Glucose 09/28/2022 267 (A)  65 - 105 mg/dL Final    POC Glucose 09/28/2022 334 (A)  65 - 105 mg/dL Final    POC Glucose 09/28/2022 270 (A)  65 - 105 mg/dL Final    POC Glucose 09/28/2022 248 (A)  65 - 105 mg/dL Final    POC Glucose 09/28/2022 251 (A)  65 - 105 mg/dL Final    POC Glucose 09/28/2022 235 (A)  65 - 105 mg/dL Final    Critical Noted    POC Glucose 09/28/2022 323 (A)  65 - 105 mg/dL Final    POC Glucose 09/28/2022 475 (A)  65 - 105 mg/dL Final    Critical Noted    POC Glucose 09/28/2022 452 (A)  65 - 105 mg/dL Final    Critical Noted    POC Glucose 09/28/2022 423 (A)  65 - 105 mg/dL Final    Critical Noted    Glucose 09/29/2022 299 (A)  70 - 99 mg/dL Final    BUN 09/29/2022 14  6 - 20 mg/dL Final    Creatinine 09/29/2022 1.05 (A)  0.50 - 0.90 mg/dL Final    Calcium 09/29/2022 8.4 (A)  8.6 - 10.4 mg/dL Final    Sodium 09/29/2022 138  135 - 144 mmol/L Final    Potassium 09/29/2022 5.0  3.7 - 5.3 mmol/L Final    Chloride 09/29/2022 103  98 - 107 mmol/L Final    CO2 09/29/2022 26  20 - 31 mmol/L Final    Anion Gap 09/29/2022 9  9 - 17 mmol/L Final    GFR Non- 09/29/2022 54 (A)  >60 mL/min Final    GFR  09/29/2022 >60  >60 mL/min Final    GFR Comment 09/29/2022        Final    Comment: Average GFR for 52-63 years old:   80 mL/min/1.73sq m  Chronic Kidney Disease:   <60 mL/min/1.73sq m  Kidney failure:   <15 mL/min/1.73sq m              eGFR calculated using average adult body mass.  Additional eGFR calculator available at:        [email or web address]            POC Glucose 09/29/2022 153 (A)  65 - 105 mg/dL Final    POC Glucose 09/29/2022 244 (A)  65 - 105 mg/dL Final    POC Glucose 09/29/2022 172 (A)  65 - 105 mg/dL Final    POC Glucose 09/30/2022 162 (A) 65 - 105 mg/dL Final    POC Glucose 09/29/2022 250 (A)  65 - 105 mg/dL Final    POC Glucose 09/30/2022 262 (A)  65 - 105 mg/dL Final    POC Glucose 09/30/2022 199 (A)  65 - 105 mg/dL Final    POC Glucose 09/30/2022 239 (A)  65 - 105 mg/dL Final    POC Glucose 10/01/2022 122 (A)  65 - 105 mg/dL Final    POC Glucose 10/01/2022 179 (A)  65 - 105 mg/dL Final    POC Glucose 10/01/2022 167 (A)  65 - 105 mg/dL Final    POC Glucose 10/01/2022 185 (A)  65 - 105 mg/dL Final    POC Glucose 10/02/2022 166 (A)  65 - 105 mg/dL Final    POC Glucose 10/02/2022 271 (A)  65 - 105 mg/dL Final    POC Glucose 10/02/2022 324 (A)  65 - 105 mg/dL Final    POC Glucose 10/02/2022 315 (A)  65 - 105 mg/dL Final    POC Glucose 10/03/2022 232 (A)  65 - 105 mg/dL Final    POC Glucose 10/03/2022 407 (A)  65 - 105 mg/dL Final    Critical Noted    POC Glucose 10/03/2022 371 (A)  65 - 105 mg/dL Final    POC Glucose 10/03/2022 410 (A)  65 - 105 mg/dL Final    Critical Noted    POC Glucose 10/04/2022 212 (A)  65 - 105 mg/dL Final    Glucose 10/04/2022 505 (A)  70 - 99 mg/dL Final    BUN 10/04/2022 13  6 - 20 mg/dL Final    Creatinine 10/04/2022 1.14 (A)  0.50 - 0.90 mg/dL Final    Calcium 10/04/2022 8.8  8.6 - 10.4 mg/dL Final    Sodium 10/04/2022 133 (A)  135 - 144 mmol/L Final    Potassium 10/04/2022 5.6 (A)  3.7 - 5.3 mmol/L Final    Chloride 10/04/2022 98  98 - 107 mmol/L Final    CO2 10/04/2022 26  20 - 31 mmol/L Final    Anion Gap 10/04/2022 9  9 - 17 mmol/L Final    Est, Glom Filt Rate 10/04/2022 57 (A)  >60 mL/min/1.73m2 Final    Comment:       Effective Oct 3, 2022        These results are not intended for use in patients <25years of age. eGFR results are calculated without a race factor using the 2021 CKD-EPI equation. Careful clinical correlation is recommended, particularly when comparing to results   calculated using previous equations.   The CKD-EPI equation is less accurate in patients with extremes of muscle mass, extra-renal metabolism of creatine, excessive creatine ingestion, or following therapy that affects   renal tubular secretion. POC Glucose 10/04/2022 388 (A)  65 - 105 mg/dL Final    POC Glucose 10/04/2022 332 (A)  65 - 105 mg/dL Final    POC Glucose 10/04/2022 337 (A)  65 - 105 mg/dL Final    POC Glucose 10/05/2022 249 (A)  65 - 105 mg/dL Final    POC Glucose 10/05/2022 384 (A)  65 - 105 mg/dL Final    POC Glucose 10/05/2022 223 (A)  65 - 105 mg/dL Final    Glucose 10/06/2022 227 (A)  70 - 99 mg/dL Final    BUN 10/06/2022 16  6 - 20 mg/dL Final    Creatinine 10/06/2022 0.89  0.50 - 0.90 mg/dL Final    Est, Glom Filt Rate 10/06/2022 >60  >60 mL/min/1.73m2 Final    Comment:       Effective Oct 3, 2022        These results are not intended for use in patients <25years of age. eGFR results are calculated without a race factor using the 2021 CKD-EPI equation. Careful clinical correlation is recommended, particularly when comparing to results   calculated using previous equations. The CKD-EPI equation is less accurate in patients with extremes of muscle mass, extra-renal   metabolism of creatine, excessive creatine ingestion, or following therapy that affects   renal tubular secretion. Calcium 10/06/2022 8.7  8.6 - 10.4 mg/dL Final    Sodium 10/06/2022 139  135 - 144 mmol/L Final    Potassium 10/06/2022 4.6  3.7 - 5.3 mmol/L Final    Chloride 10/06/2022 105  98 - 107 mmol/L Final    CO2 10/06/2022 26  20 - 31 mmol/L Final    Anion Gap 10/06/2022 8 (A)  9 - 17 mmol/L Final    POC Glucose 10/05/2022 304 (A)  65 - 105 mg/dL Final    POC Glucose 10/06/2022 209 (A)  65 - 105 mg/dL Final    POC Glucose 10/06/2022 286 (A)  65 - 105 mg/dL Final         Reviewed patient's current plan of care and vital signs with nursing staff.     Labs reviewed: [x] Yes  , K5.6, EST going for rate 57, POC glucose 38, Creat 1.14  Medications  Current Facility-Administered Medications: metFORMIN (GLUCOPHAGE) tablet 850 mg, 850 mg, Oral, BID   insulin glargine (LANTUS) injection vial 34 Units, 34 Units, SubCUTAneous, BID  insulin lispro (HUMALOG) injection vial 0-16 Units, 0-16 Units, SubCUTAneous, TID   albuterol sulfate HFA (PROVENTIL;VENTOLIN;PROAIR) 108 (90 Base) MCG/ACT inhaler 2 puff, 2 puff, Inhalation, Q4H PRN  albuterol (PROVENTIL) nebulizer solution 2.5 mg, 2.5 mg, Nebulization, Q6H PRN  rivastigmine (EXELON) 4.6 MG/24HR 1 patch, 1 patch, TransDERmal, Daily  lidocaine 4 % external patch 1 patch, 1 patch, TransDERmal, Daily  atorvastatin (LIPITOR) tablet 40 mg, 40 mg, Oral, Nightly  glucagon (rDNA) injection 1 mg, 1 mg, SubCUTAneous, PRN  glucose chewable tablet 16 g, 4 tablet, Oral, PRN  OLANZapine zydis (ZYPREXA) disintegrating tablet 15 mg, 15 mg, Oral, Nightly  pantoprazole (PROTONIX) tablet 40 mg, 40 mg, Oral, BID  pregabalin (LYRICA) capsule 75 mg, 75 mg, Oral, BID  acetaminophen (TYLENOL) tablet 650 mg, 650 mg, Oral, Q6H PRN  ibuprofen (ADVIL;MOTRIN) tablet 400 mg, 400 mg, Oral, Q6H PRN  hydrOXYzine HCl (ATARAX) tablet 50 mg, 50 mg, Oral, TID PRN  traZODone (DESYREL) tablet 50 mg, 50 mg, Oral, Nightly PRN  polyethylene glycol (GLYCOLAX) packet 17 g, 17 g, Oral, Daily PRN  aluminum & magnesium hydroxide-simethicone (MAALOX) 200-200-20 MG/5ML suspension 30 mL, 30 mL, Oral, Q6H PRN  nicotine polacrilex (NICORETTE) gum 2 mg, 2 mg, Oral, Q2H PRN  haloperidol lactate (HALDOL) injection 5 mg, 5 mg, IntraMUSCular, Q6H PRN **AND** LORazepam (ATIVAN) injection 2 mg, 2 mg, IntraMUSCular, Q6H PRN **AND** diphenhydrAMINE (BENADRYL) injection 50 mg, 50 mg, IntraMUSCular, Q6H PRN    ASSESSMENT  Unspecified psychosis not due to a substance or known physiological condition ([facility] [address]         HANDOFF  Patient symptoms: show no change   Medications as determined by attending physician  Encourage participation in groups and milieu.   Probable discharge is to be determined by MD    Electronically signed by LAILA Fajardo CNP on 10/6/2022 at 2:39 PM    **This report has been created using voice recognition software. It may contain minor errors which are inherent in voice recognition technology. **  I independently saw and evaluated the patient. I reviewed the nurse practioner's documentation above. Any additional comments or changes to the    documentation are stated below otherwise agree with assessment. The patient is discharge focused. She has poor insight. She believes she can safely live in a motel. Her information has been faxed to nursing homes. We are waiting approval.      PLAN  Medications as noted above  Attempt to develop insight  Expected Length of Stay is 2-3 days. Psycho-education conducted. Supportive Therapy conducted.   Follow-up daily while on inpatient unit    Electronically signed by Merlin Adie, MD on 10/6/22 at 6:56 PM EDT

## 2022-10-06 NOTE — GROUP NOTE
Group Therapy Note    Date: 10/6/2022    Group Start Time: 1430  Group End Time: 1500  Group Topic: Healthy Living/Wellness    [facility]    Christian Pinzon        Group Therapy Note    Attendees: 4/12       Patient's Goal:  Watch relaxation video    Notes:  discuss positive relaxation    Status After Intervention:  Unchanged    Participation Level: Active Listener and Interactive    Participation Quality: Appropriate and Attentive      Speech:  normal      Thought Process/Content: Logical      Affective Functioning: Congruent      Mood:  controlled      Level of consciousness:  Oriented x4      Response to Learning: Able to verbalize current knowledge/experience and Progressing to goal      Endings: None Reported    Modes of Intervention: Education and Media      Discipline Responsible: Behavorial Health Tech      Signature:   Christian Pinzon

## 2022-10-06 NOTE — PLAN OF CARE
Problem: Safety - Adult  Goal: Free from fall injury  Outcome: Progressing  Note: Pt remains free from falls. Pt shows adequate use of ambulatory devices. Problem: Self Harm/Suicidality  Goal: Will have no self-injury during hospital stay  Description: INTERVENTIONS:  1. Q 30 MINUTES: Routine safety checks  2. Q SHIFT & PRN: Assess risk to determine if routine checks are adequate to maintain patient safety  10/6/2022 1125 by Shauna Cueva RN  Outcome: Progressing  Note: Pt remains free of self inflicted injury. Pt denies having suicidal or homicidal ideations. Pt denies having anxiety but reports having depression. Pt reports having \"some\" improvement in her mood. Pt denies having hallucinations. Pt is able to care for her ADL's. Pt reports having adequate diet and sleep. Pt is cooperative with staff and compliant with medical treatment. Pt is out in the day room but is isolative to self.

## 2022-10-07 LAB
GLUCOSE BLD-MCNC: 159 MG/DL (ref 65–105)
GLUCOSE BLD-MCNC: 192 MG/DL (ref 65–105)
GLUCOSE BLD-MCNC: 245 MG/DL (ref 65–105)
GLUCOSE BLD-MCNC: 349 MG/DL (ref 65–105)

## 2022-10-07 PROCEDURE — 6370000000 HC RX 637 (ALT 250 FOR IP): Performed by: PSYCHIATRY & NEUROLOGY

## 2022-10-07 PROCEDURE — 6370000000 HC RX 637 (ALT 250 FOR IP): Performed by: INTERNAL MEDICINE

## 2022-10-07 PROCEDURE — 97110 THERAPEUTIC EXERCISES: CPT

## 2022-10-07 PROCEDURE — 1240000000 HC EMOTIONAL WELLNESS R&B

## 2022-10-07 PROCEDURE — 99232 SBSQ HOSP IP/OBS MODERATE 35: CPT | Performed by: INTERNAL MEDICINE

## 2022-10-07 PROCEDURE — 97530 THERAPEUTIC ACTIVITIES: CPT

## 2022-10-07 PROCEDURE — 99232 SBSQ HOSP IP/OBS MODERATE 35: CPT | Performed by: PSYCHIATRY & NEUROLOGY

## 2022-10-07 PROCEDURE — 82947 ASSAY GLUCOSE BLOOD QUANT: CPT

## 2022-10-07 PROCEDURE — 6370000000 HC RX 637 (ALT 250 FOR IP): Performed by: NURSE PRACTITIONER

## 2022-10-07 PROCEDURE — APPSS30 APP SPLIT SHARED TIME 16-30 MINUTES: Performed by: NURSE PRACTITIONER

## 2022-10-07 RX ORDER — INSULIN LISPRO 100 [IU]/ML
0-4 INJECTION, SOLUTION INTRAVENOUS; SUBCUTANEOUS NIGHTLY
Status: DISCONTINUED | OUTPATIENT
Start: 2022-10-07 | End: 2022-10-13 | Stop reason: HOSPADM

## 2022-10-07 RX ORDER — INSULIN GLARGINE 100 [IU]/ML
38 INJECTION, SOLUTION SUBCUTANEOUS 2 TIMES DAILY
Status: DISCONTINUED | OUTPATIENT
Start: 2022-10-07 | End: 2022-10-13 | Stop reason: HOSPADM

## 2022-10-07 RX ORDER — INSULIN LISPRO 100 [IU]/ML
0-8 INJECTION, SOLUTION INTRAVENOUS; SUBCUTANEOUS
Status: DISCONTINUED | OUTPATIENT
Start: 2022-10-07 | End: 2022-10-13 | Stop reason: HOSPADM

## 2022-10-07 RX ADMIN — METFORMIN HYDROCHLORIDE 850 MG: 850 TABLET ORAL at 17:16

## 2022-10-07 RX ADMIN — HYDROXYZINE HYDROCHLORIDE 50 MG: 50 TABLET ORAL at 21:08

## 2022-10-07 RX ADMIN — PANTOPRAZOLE SODIUM 40 MG: 40 TABLET, DELAYED RELEASE ORAL at 07:56

## 2022-10-07 RX ADMIN — ATORVASTATIN CALCIUM 40 MG: 20 TABLET, FILM COATED ORAL at 21:07

## 2022-10-07 RX ADMIN — INSULIN LISPRO 12 UNITS: 100 INJECTION, SOLUTION INTRAVENOUS; SUBCUTANEOUS at 12:00

## 2022-10-07 RX ADMIN — IBUPROFEN 400 MG: 400 TABLET ORAL at 06:59

## 2022-10-07 RX ADMIN — PREGABALIN 75 MG: 75 CAPSULE ORAL at 07:56

## 2022-10-07 RX ADMIN — ACETAMINOPHEN 650 MG: 325 TABLET, FILM COATED ORAL at 21:08

## 2022-10-07 RX ADMIN — IBUPROFEN 400 MG: 400 TABLET ORAL at 20:01

## 2022-10-07 RX ADMIN — PANTOPRAZOLE SODIUM 40 MG: 40 TABLET, DELAYED RELEASE ORAL at 21:08

## 2022-10-07 RX ADMIN — METFORMIN HYDROCHLORIDE 850 MG: 850 TABLET ORAL at 07:56

## 2022-10-07 RX ADMIN — INSULIN GLARGINE 38 UNITS: 100 INJECTION, SOLUTION SUBCUTANEOUS at 21:08

## 2022-10-07 RX ADMIN — OLANZAPINE 15 MG: 10 TABLET, ORALLY DISINTEGRATING ORAL at 21:08

## 2022-10-07 RX ADMIN — INSULIN GLARGINE 34 UNITS: 100 INJECTION, SOLUTION SUBCUTANEOUS at 07:57

## 2022-10-07 RX ADMIN — PREGABALIN 75 MG: 75 CAPSULE ORAL at 21:08

## 2022-10-07 ASSESSMENT — PAIN - FUNCTIONAL ASSESSMENT
PAIN_FUNCTIONAL_ASSESSMENT: PREVENTS OR INTERFERES SOME ACTIVE ACTIVITIES AND ADLS
PAIN_FUNCTIONAL_ASSESSMENT: 0-10
PAIN_FUNCTIONAL_ASSESSMENT: PREVENTS OR INTERFERES SOME ACTIVE ACTIVITIES AND ADLS
PAIN_FUNCTIONAL_ASSESSMENT: PREVENTS OR INTERFERES SOME ACTIVE ACTIVITIES AND ADLS

## 2022-10-07 ASSESSMENT — PAIN SCALES - GENERAL
PAINLEVEL_OUTOF10: 10
PAINLEVEL_OUTOF10: 8
PAINLEVEL_OUTOF10: 10
PAINLEVEL_OUTOF10: 10
PAINLEVEL_OUTOF10: 2
PAINLEVEL_OUTOF10: 6
PAINLEVEL_OUTOF10: 8
PAINLEVEL_OUTOF10: 0

## 2022-10-07 ASSESSMENT — PAIN DESCRIPTION - LOCATION
LOCATION: GENERALIZED

## 2022-10-07 ASSESSMENT — PAIN DESCRIPTION - DESCRIPTORS
DESCRIPTORS: ACHING;SHARP

## 2022-10-07 NOTE — GROUP NOTE
Group Therapy Note    Date: 10/7/2022    Group Start Time: 2230  Group End Time: 2255  Group Topic: Wrap-Up    [facility] SHANNEN Delgado    Group Therapy Note    Attendees: 11/15       Patient's Goal: Patient will verbalize today's goals as well as for post discharge. Patient will also offer supportive listening and interact with peers to clarify and understand clearly set goals. Notes: Patient is making progress AEB participating in group discussion, actively listening, and supporting other group members. Status After Intervention: Improved      Participation Level:  Active Listener and Interactive      Participation Quality: Appropriate, Attentive, Sharing, and Supportive      Speech: normal      Thought Process/Content: Logical, Linear      Affective Functioning: Congruent      Mood: anxious      Level of consciousness: Oriented x4      Response to Learning: Able to verbalize current knowledge/experience, Able to verbalize/acknowledge new learning,      Able to retain information, and Capable of insight      Endings: None Reported      Modes of Intervention: Education, Support, Socialization, and Problem-solving           Discipline Responsible: Behavorial Health Tech      Signature: Vinicius Delgado

## 2022-10-07 NOTE — PROGRESS NOTES
Behavioral Services                                              Medicare Re-Certification    I certify that the inpatient psychiatric hospital services furnished since the previous certification/re-certification were, and continue to be, medically necessary for;    [x] (1) Treatment which could reasonably be expected to improve the patient's condition,    [x] (2) Or for diagnostic study. Estimated length of stay/service 2-5 days    Plan for post-hospital care -outpatient care    This patient continues to need, on a daily basis, active treatment furnished directly by or requiring the supervision of inpatient psychiatric personnel.     Electronically signed by Earl Ortega MD on 10/7/2022 at 6:58 PM

## 2022-10-07 NOTE — CARE COORDINATION
SOCIAL SERVICE NOTE: CLAYTON speaks with PT daughter Piero Cardozo at [phone], to let her know that staff from [facility] in [place], [place] did an onsite assessment with Pt on this date and they reported that they will accept PT for admission and that they will [facility] to begin the pre [facility] process. CLAYTON informed Anne Robledo that staff did express concern that PT is her own guardian at this time and would be able to sign out. Ananya reports that she is in the process for applying for Guardianship and she reported that she has contacted the courts, but has not filled out all of the paperwork. Pt daughter Anne Robledo does have the Expert Evaluation form that Dr. Mariela Cosby completed. Pt will be transferred sometime early next week when the precert through Pt's insurance is approved. CLAYTON will continue to monitor the situation.

## 2022-10-07 NOTE — PLAN OF CARE
[facility] Interdisciplinary Treatment Plan Note     Review Date & Time: 10/7/2022   1315    Admission Type:   Admission Type: Involuntary (Pink Slipped on 9/22/22 at 11:25 am)    Reason for admission:  Reason for Admission: Poor insight, unable of making decisions on own at this time. Psychosis unspecified.       Estimated Discharge Date Update: to be determined by physician    PATIENT STRENGTHS:  Patient Strengths:   Patient Strengths and Limitations:Limitations: Difficult relationships / poor social skills, Difficulty problem solving/relies on others to help solve problems, Inappropriate/potentially harmful leisure interests  Addictive Behavior:Addictive Behavior  In the Past 3 Months, Have You Felt or Has Someone Told You That You Have a Problem With  : None  Medical Problems:   Past Medical History:   Diagnosis Date    Acid reflux 5/29/2017    Acute cystitis with hematuria 10/11/2016    Acute non-recurrent maxillary sinusitis 11/28/2017    Asthma     Bipolar 1 disorder ([address] 1/4/2018    Bipolar disorder, mixed ([address]     BMI 34.0-34.9,adult 11/28/2017    Cannabis use disorder, severe, dependence ([address] 12/19/2017    Cerebrovascular accident (CVA) ([address] 6/14/2017    Chest pain 11/5/2016    Chronic renal insufficiency     Cocaine abuse ([address] 1/5/2018    COVID-19 virus RNA test result unknown     DDD (degenerative disc disease), cervical     Diabetes mellitus ([address]     Dizziness 6/13/2017    Fibromyalgia     History of stroke 9/9/2017    Homicidal ideation 11/6/2017    Hyperglycemia     Hypertension     Hypotension 1/18/2019    IDDM (insulin dependent diabetes mellitus) 12/21/2015    Lupus ([address]     Migraine     Neuropathy     Neuropathy     Polysubstance abuse ([address] 9/17/2017    Post traumatic stress disorder (PTSD)     Posttraumatic stress disorder 5/29/2017    Recurrent depression ([address] 5/29/2017    Recurrent major depressive disorder, in partial remission ([address] 11/28/2017    Screening mammogram, encounter for 11/28/2017    Severe recurrent major depression with psychotic features ([facility] 12/19/2017    Severe recurrent major depression without psychotic features ([address] 12/19/2017    Stroke (cerebrum) ([facility] 6/14/2017    Stroke ([facility]     per chart notes    Suicidal ideation     Suicidal intent 3/10/2017    Vitamin D deficiency 11/28/2017    White matter changes 6/13/2017       Risk:  Fall Risk   Marquez Scale Marquez Scale Score: 22  BVC    Change in scores no. Changes to plan of Care  no    Status EXAM:   Mental Status and Behavioral Exam  Normal: No  Level of Assistance: Independent/Self  Facial Expression: Flat, Exaggerated  Affect: Blunt  Level of Consciousness: Alert  Frequency of Checks: 4 times per hour, close  Mood:Normal: No  Mood: Depressed, Anxious, Irritable  Motor Activity:Normal: No  Motor Activity: Unusual posture/gait  Eye Contact: Fair  Observed Behavior: Preoccupied, Cooperative  Sexual Misconduct History: Current - no  Involved In Any Sexual Misconduct With Others? : No  History of Sexually Inappropriate Behavior When Previously Hospitalized?: No  Uncontrollable/Compulsive Masturbation?: No  Difficulty Controlling Sexual Impulses?: No  Preception: Brandt to person, Brandt to time, Brandt to place, Brandt to situation  Attention:Normal: No  Attention: Distractible  Thought Processes: Circumstantial  Thought Content:Normal: No  Thought Content: Preoccupations  Depression Symptoms: Isolative, Loss of interest  Anxiety Symptoms: Generalized  Jennifer Symptoms: No problems reported or observed.   Hallucinations: None  Delusions: No  Delusions: Controlling  Memory:Normal: No  Memory: Poor recent, Poor remote  Insight and Judgment: No  Insight and Judgment: Poor judgment, Poor insight    Daily Assessment Last Entry:   Daily Sleep (WDL): Within Defined Limits            Daily Nutrition (WDL): Within Defined Limits  Level of Assistance: Independent/Self    Patient Monitoring:  Frequency of Checks: 4 times per hour, close    Psychiatric Symptoms:   Depression Symptoms  Depression Symptoms: Isolative, Loss of interest  Anxiety Symptoms  Anxiety Symptoms: Generalized  Jennifer Symptoms  Jennifer Symptoms: No problems reported or observed. Suicide Risk CSSR-S:  1) Within the past month, have you wished you were dead or wished you could go to sleep and not wake up? : No  2) Have you actually had any thoughts of killing yourself? : No  6) Have you ever done anything, started to do anything, or prepared to do anything to end your life?: No  Change in Result no Change in Plan of care no    EDUCATION:   Learner Progress Toward Treatment Goals: Reviewed goals and plan of care    Method: Small group    Outcome: Refused Education    PATIENT GOALS: pt refused meeting.      PLAN/TREATMENT RECOMMENDATIONS UPDATE:   COPING SKILLS CONTINUE WITH GROUP THERAPIES POSITIVE INTERACTIONS, GOAL SETTING    SHORT-TERM GOALS UPDATE:  Time frame for Short-Term Goals: 1-2 WEEKS     LONG-TERM GOALS UPDATE:  Time frame for Long-Term Goals: 6 MONTHS  Members Present in Team Meeting: See Signature Sheet    Carlos Anderson RN

## 2022-10-07 NOTE — CARE COORDINATION
[address] [facility] will accept patient. They will start pre cert through insurance on this date.  Patient is agreeable

## 2022-10-07 NOTE — PROGRESS NOTES
[place] [address] Internal Medicine    Progress note             Date:   10/7/2022  Patient name:  Александр Duarte  Date of admission:  9/22/2022  3:40 PM  MRN:   [id number]  Account:  [de-identified]  YOB: 1965  PCP:    Nuris Patterson MD  Room:   [facility]20-  Code Status:    Full Code    Physician Requesting Consult: Yesi Kimball MD    Reason for Consult: History and physical, medical management    Chief Complaint:     No chief complaint on file. Medical management    History Obtained From:     Patient, EMR, nursing staff    History of Present Illness:     74-year-old female admitted for acute psychotic state    She was initially admitted to medical unit after being found unresponsive at her home, treated for DKA with IV insulin, acute renal failure with substantial hydration, and hypovolemic shock requiring Levophed, severe hypernatremia >170, concern for GI bleed status post EGD which showed multiple duodenal ulcers treated with PPI.   Patient remained confused and delusional, CT head negative, MRI could not be done due to lack of patient cooperation      Medical history includes asthma, hypertension, stroke, type 2 diabetes, polysubstance abuse, recent ischemic stroke  Past Medical History:     Past Medical History:   Diagnosis Date    Acid reflux 5/29/2017    Acute cystitis with hematuria 10/11/2016    Acute non-recurrent maxillary sinusitis 11/28/2017    Asthma     Bipolar 1 disorder ([address] 1/4/2018    Bipolar disorder, mixed ([address]     BMI 34.0-34.9,adult 11/28/2017    Cannabis use disorder, severe, dependence ([address] 12/19/2017    Cerebrovascular accident (CVA) ([address] 6/14/2017    Chest pain 11/5/2016    Chronic renal insufficiency     Cocaine abuse ([address] 1/5/2018    COVID-19 virus RNA test result unknown     DDD (degenerative disc disease), cervical     Diabetes mellitus ([address]     Dizziness 6/13/2017    Fibromyalgia     History of stroke 9/9/2017    Homicidal ideation 11/6/2017    Hyperglycemia     Hypertension     Hypotension 1/18/2019    IDDM (insulin dependent diabetes mellitus) 12/21/2015    Lupus ([address]     Migraine     Neuropathy     Neuropathy     Polysubstance abuse ([address] 9/17/2017    Post traumatic stress disorder (PTSD)     Posttraumatic stress disorder 5/29/2017    Recurrent depression ([address] 5/29/2017    Recurrent major depressive disorder, in partial remission ([address] 11/28/2017    Screening mammogram, encounter for 11/28/2017    Severe recurrent major depression with psychotic features ([address] 12/19/2017    Severe recurrent major depression without psychotic features ([address] 12/19/2017    Stroke (cerebrum) ([address] 6/14/2017    Stroke ([address]     per chart notes    Suicidal ideation     Suicidal intent 3/10/2017    Vitamin D deficiency 11/28/2017    White matter changes 6/13/2017        Past Surgical History:     Past Surgical History:   Procedure Laterality Date    ABDOMEN SURGERY      drain tube    ABSCESS DRAINAGE      right buttock    CATARACT REMOVAL WITH IMPLANT Bilateral     CHEST TUBE INSERTION      FINGER AMPUTATION Left 03/13/2021    LEFT RING FINER AMPUTATION performed by Thelma Pelayo MD at [address] [facility] Right 10/11/2021    AMPUTATION RIGHT INDEX FINGER performed by Thelma Pelayo MD at [address] [place] [address] Right 1/27/2022    FOOT ABSCESS INCISION AND DRAINAGE  (PULSE LAVAGE, CULTURE SWABS) performed by Polo Degroot DPM at [facility] Right 01/27/2022    FOOT ABSCESS INCISION AND DRAINAGE (PULSE LAVAGE, CULTURE SWABS) - Right    HAND SURGERY Right 09/14/2021    I&D INDEX FINGER performed by Thelma Pelayo MD at [facility] Right 09/15/2021    I&D INDEX FINGER performed by Thelma Pelayo MD at [address]  2/3/2022         LASIK Bilateral     UPPER GASTROINTESTINAL ENDOSCOPY N/A 9/16/2022    EGD BIOPSY performed by Vinita Owens MD at [facility] Prior to Admission:     Prior to Admission medications    Medication Sig Start Date End Date Taking? Authorizing Provider   atorvastatin (LIPITOR) 40 MG tablet Take 1 tablet by mouth nightly 9/27/22 12/26/22 Yes Leeanna Kebede MD   blood glucose monitor strips Test 3 times a day & as needed for symptoms of irregular blood glucose. Dispense sufficient amount for indicated testing frequency plus additional to accommodate PRN testing needs. 9/27/22  Yes Leeanna Kebede MD   metFORMIN (GLUCOPHAGE) 1000 MG tablet Take 1 tablet by mouth 2 times daily (with meals) 9/27/22  Yes Leeanna Kebede MD   traZODone (DESYREL) 50 MG tablet Take 1 tablet by mouth nightly as needed for Sleep 9/27/22  Yes Leeanna Kebede MD   pantoprazole (PROTONIX) 40 MG tablet Take 1 tablet by mouth 2 times daily 9/27/22  Yes Leeanna Kebede MD   OLANZapine (ZYPREXA) 10 MG tablet Take 1.5 tablets by mouth nightly 9/27/22  Yes Leeanna Kebede MD   insulin glargine (LANTUS SOLOSTAR) 100 UNIT/ML injection pen Inject 30 Units into the skin 2 times daily 4/22/22   Inocente Fox MD   fluticasone (FLONASE) 50 MCG/ACT nasal spray 1 spray by Each Nostril route daily 3/15/22   Inocente Fox MD   Alcohol Swabs 70 % PADS Use 1 swab 3 times daily as needed 3/15/22   Inocente Fox MD   Lancets MISC 1 each by Does not apply route 3 times daily 3/15/22   Inocente Fox MD   Insulin Pen Needle ([employer] PEN NEEDLES 31G) 31G X 8 MM MISC 1 each by Does not apply route daily 3/15/22   Inocente Fox MD   budesonide-formoterol (SYMBICORT) 80-4.5 MCG/ACT AERO Inhale 2 puffs into the lungs 2 times daily 3/15/22   Inocente Fox MD   albuterol sulfate  (90 Base) MCG/ACT inhaler Inhale 2 puffs into the lungs every 4 hours as needed for Wheezing 3/15/22 4/15/22  Inocente Fox MD   Misc.  Devices MISC 1 PAIR OF DIABETIC SHOES (1 LEFT/ 1 RIGHT)  1-3 PAIRS OF INSERTS (LEFT/ RIGHT) 2/15/22   Elida Almonte, SARA        Allergies:     Bactrim [sulfamethoxazole-trimethoprim] and Adhesive tape    Social History:     Tobacco:    reports that she has never smoked. She has never used smokeless tobacco.  Alcohol:      reports that she does not currently use alcohol. Drug Use:  reports current drug use. Drugs: Marijuana (Weed) and Cocaine. Family History:     Family History   Problem Relation Age of Onset    Diabetes Mother     Stroke Mother     Diabetes Father     Diabetes Sister     Diabetes Brother     Other Son         GSW    No Known Problems Sister        Review of Systems:     Positive and Negative as described in HPI. Denies any shortness of breath or cough  Denies chest pain or palpitations  Denies diarrhea vomiting. Reports mild epigastric pain  Denies any new numbness tremors or weakness. 10 point review of systems was negative other than mentioned above    Physical Exam:     /78   Pulse 93   Temp 98.1 °F (36.7 °C) (Temporal)   Resp 14   Ht 5' 5\" (1.651 m)   Wt 207 lb (93.9 kg)   LMP  (LMP Unknown)   PF (!) 14 L/min   BMI 34.45 kg/m²   Temp (24hrs), Av.1 °F (36.7 °C), Min:98.1 °F (36.7 °C), Max:98.1 °F (36.7 °C)    Recent Labs     10/06/22  1645 10/06/22  2003 10/07/22  0734 10/07/22  1156   POCGLU 286* 319* 159* 349*       No intake or output data in the 24 hours ending 10/07/22 1220    General Appearance:  alert, well appearing, and in no acute distress  Head:  normocephalic, atraumatic. Eye: no icterus, redness, pupils equal and reactive, extraocular eye movements intact, conjunctiva clear  Ear: normal external ear, no discharge, hearing intact  Nose:  no drainage noted  Mouth: mucous membranes moist  Neck: supple, no carotid bruits, thyroid not palpable  Lungs: Bilateral equal air entry, clear to ausculation, no wheezing, rales or rhonchi, normal effort  Cardiovascular: normal rate, regular rhythm, no murmur, gallop, rub.   Abdomen: Soft, nontender, nondistended, normal bowel sounds, no hepatomegaly or splenomegaly  Neurologic: difficult to assess, limited pt co-operation. Alert, oriented X 1-2, mobile, power equal all 4 limibs. Normal speech but abnormal content, confabulation present. No focal neurology otherwise  Skin: No gross lesions, rashes, bruising or bleeding on exposed skin area  Extremities:  No joint swelling, no pedal edema or calf pain with palpation      Investigations:      Laboratory Testing:  Recent Results (from the past 24 hour(s))   POC Glucose Fingerstick    Collection Time: 10/06/22  4:45 PM   Result Value Ref Range    POC Glucose 286 (H) 65 - 105 mg/dL   POC Glucose Fingerstick    Collection Time: 10/06/22  8:03 PM   Result Value Ref Range    POC Glucose 319 (H) 65 - 105 mg/dL   POC Glucose Fingerstick    Collection Time: 10/07/22  7:34 AM   Result Value Ref Range    POC Glucose 159 (H) 65 - 105 mg/dL   POC Glucose Fingerstick    Collection Time: 10/07/22 11:56 AM   Result Value Ref Range    POC Glucose 349 (H) 65 - 105 mg/dL       Imaging/Diagonstics:  Recent data reviewed    Assessment :      Primary Problem  Unspecified psychosis not due to a substance or known physiological condition [facility])    Active Hospital Problems    Diagnosis Date Noted    Unsp psychosis not due to a substance or known physiol cond ([address] [F29] 09/28/2022     Priority: Medium    Fentanyl use disorder, mild ([address] [F11.10] 09/23/2022     Priority: Medium    Unspecified psychosis not due to a substance or known physiological condition ([address] [F29] 09/22/2022     Priority: Medium    Cocaine use disorder ([address] [F14.10] 01/05/2018       Plan:     Medications: Allergies:     Allergies   Allergen Reactions    Bactrim [Sulfamethoxazole-Trimethoprim] Swelling    Adhesive Tape Rash       Current Meds:   Scheduled Meds:    metFORMIN  850 mg Oral BID WC    insulin glargine  34 Units SubCUTAneous BID    insulin lispro  0-16 Units SubCUTAneous TID WC    rivastigmine  1 patch TransDERmal Daily    lidocaine  1 patch TransDERmal Daily    atorvastatin  40 mg Oral Nightly    OLANZapine zydis  15 mg Oral Nightly    pantoprazole  40 mg Oral BID    pregabalin  75 mg Oral BID     Continuous Infusions:   PRN Meds: albuterol sulfate HFA, albuterol, glucagon (rDNA), glucose, acetaminophen, ibuprofen, hydrOXYzine HCl, traZODone, polyethylene glycol, aluminum & magnesium hydroxide-simethicone, nicotine polacrilex, haloperidol lactate **AND** LORazepam **AND** diphenhydrAMINE    Reason for consult: General medical management     Acute psychosis - management per psych-  DKA- resolved  Type 2 DM- BG lantus, SSI- BG controlled, resume statin, metformin  Acute renal failure- creat normalized  Acute toxic encephalopathy- still encephalopathic  Hypertension- currently off meds- BP controlled  Upper GI bleed, duodenal ulcers-  c/w PPI  Recent stroke- off ASA due to recent GI bleed- Hb 7.6 last, will repeat    Repeat labs ordered, including HIV and hep C per patient request    DVT prophylaxis-not required, patient is mobile    10/4/22    Poor control dm  Recent Labs     10/06/22  1136 10/06/22  1645 10/06/22  2003 10/07/22  0734 10/07/22  1156   POCGLU 286* 286* 319* 159* 349*         Last creatinine 1.05            Was on metformin 1000 mg bid   Will resume 500 bid   Resume home dose 30 units  bid with meals . At present 17 units bid   Monitor            10/6/22    Blood sugars improved but still suboptimal    Last hba1c 11.4  Lantus increased to 34 units   Metformin 850 bid   On high dose sliding scale . Poor control The current medical regimen is effective;  continue present plan and medications.  To titrate           10/7/22    Poor control dm  Increase lantuds to 38 bid  Reduce dlifimg scale to medium    [place] to monitor              Consultations:   Ximena Ulrich MD  10/7/2022  12:20 PM    Copy sent to Isaac Hernandez MD    Please note that this chart was generated using voice recognition Dragon dictation software. Although every effort was made to ensure the accuracy of this automated transcription, some errors in transcription may have occurred.

## 2022-10-07 NOTE — PLAN OF CARE
Problem: Chronic Conditions and Co-morbidities  Goal: Patient's chronic conditions and co-morbidity symptoms are monitored and maintained or improved  Outcome: Progressing  Patient's blood sugar was 319. One-time order received for 5 units humalog. Problem: Anxiety  Goal: Will report anxiety at manageable levels  Description: INTERVENTIONS:  1. Administer medication as ordered  2. Teach and rehearse alternative coping skills  3. Provide emotional support with 1:1 interaction with staff  Outcome: Progressing  Patient is cooperative with staff. She reports anxiety related to her still being here. Problem: Confusion  Goal: Confusion, delirium, dementia, or psychosis is improved or at baseline  Description: INTERVENTIONS:  1. Assess for possible contributors to thought disturbance, including medications, impaired vision or hearing, underlying metabolic abnormalities, dehydration, psychiatric diagnoses, and notify attending LIP  2. [place] high risk fall precautions, as indicated  3. Provide frequent short contacts to provide reality reorientation, refocusing and direction  4. Decrease environmental stimuli, including noise as appropriate  5. Monitor and intervene to maintain adequate nutrition, hydration, elimination, sleep and activity  6. If unable to ensure safety without constant attention obtain sitter and review sitter guidelines with assigned personnel  7. Initiate Psychosocial CNS and Spiritual Care consult, as indicated  Outcome: Progressing  Patient is alert and oriented to person, place, time and situation. Problem: Safety - Adult  Goal: Free from fall injury  10/6/2022 2302 by Darvin Middleton RN  Outcome: Progressing  Patient remains free of falls and verbalizes understanding of individual fall risks. Patient is wearing non-skid footwear and encouraged to seek out staff for any assistance needed.       Problem: Pain  Goal: Verbalizes/displays adequate comfort level or baseline comfort level  Outcome: Progressing     Patient reports chronic pain that is unrelieved by available medications. Problem: Self Harm/Suicidality  Goal: Will have no self-injury during hospital stay  Description: INTERVENTIONS:  1. Q 30 MINUTES: Routine safety checks  2. Q SHIFT & PRN: Assess risk to determine if routine checks are adequate to maintain patient safety  10/6/2022 2302 by Re Chris RN  Outcome: Progressing  Patient has made no attempt to harm self at this time. Patient denies suicidal ideation, homicidal ideation and hallucinations at this time. Safety plan reviewed with patient, agrees to approach staff when feeling upset. 15 minute and random checks maintained for safety. No violent or escalating behaviors noted during this shift. Patient is currently calm, controlled and medication-compliant.
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
## 2022-10-07 NOTE — PROGRESS NOTES
Physical Therapy        Physical Therapy Cancel Note      DATE: 10/7/2022    NAME: Elen Hill  MRN: [id number]   : 1965      Patient not seen this date for Physical Therapy due to: Other: Pt is unavailable, working with OT upon arrival. Will continue to check on pt for PT services.        Electronically signed by Kay Ríos PTA on 10/7/2022 at 2:54 PM

## 2022-10-07 NOTE — PROGRESS NOTES
[facility]: [facility]   INPATIENT OCCUPATIONAL THERAPY  PROGRESS NOTE  Date: 10/7/2022  Patient Name: Robby Elliott       Room: 5619/2183-25  MRN: [id number]    : 1965  (56 y.o.)  Gender: female      Diagnosis: psychosis, DM, Recent hx: DKA, Acute toxic encephalopathy , upper GI bleed. PMHx: lupus, DDD, White matter changes, CVA, bipolar disorder and PTSD, polysubstance abuse. treated for DKA with IV insulin, acute renal failure with substantial hydration, and hypovolemic shock requiring Levophed, severe hypernatremia >170, concern for GI bleed status post EGD which showed multiple duodenal ulcers treated with PPI. Patient remained confused and delusional, CT head negative, MRI could not be done due to lack of patient cooperation    Restrictions/Precautions  Restrictions/Precautions  Restrictions/Precautions: Fall Risk  Required Braces or Orthoses?: No         Subjective  Subjective  Subjective: Pt is pleasant and agreeable to therapy this date       Objective  Orientation  Overall Orientation Status: Within Functional Limits  Cognition  Overall Cognitive Status: Exceptions  Arousal/Alertness: Appropriate responses to stimuli  Following Commands: Follows one step commands consistently; Follows one step commands with increased time  Attention Span: Attends with cues to redirect  Memory: Decreased short term memory;Decreased recall of recent events;Decreased long term memory  Safety Judgement: Decreased awareness of need for assistance;Decreased awareness of need for safety  Problem Solving: Assistance required to identify errors made;Assistance required to generate solutions;Assistance required to implement solutions;Assistance required to correct errors made;Decreased awareness of errors  Insights: Decreased awareness of deficits  Initiation: Does not require cues  Sequencing: Requires cues for some  Cognition Comment: attempt to increase pt awareness of need to address mobility deficits noted.    Functional Activities  OT Exercises  Exercise Treatment: Pt completed minimal activity this date with resistive bands before stating \" I don't want to do this\". Pt refused any other activity      Patient Education  Patient Education  Education Given To: Patient  Education Provided: Role of Therapy, Plan of Care, Transfer Training, Equipment, Fall Prevention Strategies, Precautions  Education Method: Verbal  Barriers to Learning: Cognition  Education Outcome: Continued education needed      Goals  Patient Goals   Patient goals : pt does not feel she needs to show anyone she can do things ie. stand. she did live home indep and had 4WW, now using w/c, not ambulating. Short Term Goals  Time Frame for Short Term Goals: 1 week  Short Term Goal 1: pt to dudley 3-5 min standing during adls with 1 UE support and CGA. Short Term Goal 2: mod indep with toilet transfer (from w/c with BSC over toilet)  (if allowed on BHI)  Short Term Goal 3: set up bathe and dress with good cognition, safety and balance  Short Term Goal 4: pt to engage in leisure activity of her choice during OT and demo fair attention to task for 8 minutes. Short Term Goal 5: pt to ambulate with least restrictive device and CGA during adls ie. from bed to toilet and reverse (approx 20 feet)  Additional Goals?: Yes  Occupational Therapy Plan  Times Per Week: 3-5  Current Treatment Recommendations: ROM, Strengthening, Balance training, Functional mobility training, Safety education & training, Self-Care / ADL, Home management training, Endurance training, Patient/Caregiver education & training, Equipment evaluation, education, & procurement, Cognitive/Perceptual training      Assessment  Activity Tolerance  Activity Tolerance: Patient Tolerated treatment well, Treatment limited secondary to decreased cognition  Activity Tolerance Comments: some components of eval not able to be formally assessed due to pt agitation.   conversation, observation and pt directed approach was used. a break between therapy attempts was utilized. pt yelling at therapist during 2nd attempt to engage pt in mobility assessment, attempt to increase pt awareness of need to address mobility deficits not successful. RN intervened and told pt she could decline and pt calmed down and later apologized. pt also refused to talk to NP. OT let NP know re pt complaint of R sided pain especially LE hip to foot. Assessment  Performance deficits / Impairments: Decreased functional mobility , Decreased endurance, Decreased ADL status, Decreased balance, Decreased safe awareness, Decreased high-level IADLs, Decreased cognition  Assessment: Pt completed leisure activity of placing cards this date. Completing following comands with minimal cues. Pt able to provide attention to task for 15 min  Treatment Diagnosis: Impaired self care status  Prognosis: Guarded  Decision Making: High Complexity  History: psychosis, DM, Recent hx: DKA, Acute toxic encephalopathy , upper GI bleed. PMHx: lupus, DDD, White matter changes, CVA, bipolar disorder and PTSD, polysubstance abuse. treated for DKA with IV insulin, acute renal failure with substantial hydration, and hypovolemic shock requiring Levophed, severe hypernatremia >170, concern for GI bleed status post EGD which showed multiple duodenal ulcers treated with PPI.   Patient remained confused and delusional, CT head negative, MRI could not be done due to lack of patient cooperation  Exam: 7 performance deficits  Assistance / Modification: high due to cognition, mobility, pain, agitation  OT Equipment Recommendations  Other: TBD  Safety Devices  Type of Devices: Nurse notified, Patient at risk for falls, Left in chair      AM-PAC Daily Activities Inpatient  AM-PAC Daily Activity Inpatient   How much help for putting on and taking off regular lower body clothing?: A Lot  How much help for Bathing?: A Lot  How much help for Toileting?: A Little  How much help for putting on and taking off regular upper body clothing?: A Little  How much help for taking care of personal grooming?: A Little  How much help for eating meals?: None  AM-PAC Inpatient Daily Activity Raw Score: 17  AM-PAC Inpatient ADL T-Scale Score : 37.26  ADL Inpatient CMS 0-100% Score: 50.11  ADL Inpatient CMS G-Code Modifier : CK    OT Minutes  OT Individual Minutes  Time In: 1426  Time Out: Sophia 52  Minutes: 31      TORO Manrique

## 2022-10-07 NOTE — GROUP NOTE
Group Therapy Note    Date: 10/7/2022    Group Start Time: 0900  Group End Time: 1876  Group Topic: Community Meeting    DANIELLE Nuñez        Group Therapy Note    Attendees: 4/18       Patient's Goal:  \"Work on me and my mind\"    Notes:  Irritable    Status After Intervention:  Unchanged    Participation Level: Interactive    Participation Quality: Appropriate      Speech:  pressured      Thought Process/Content: Logical      Affective Functioning: Congruent      Mood: irritable      Level of consciousness:  Oriented x4      Response to Learning: Able to verbalize current knowledge/experience and Progressing to goal      Endings: None Reported    Modes of Intervention: Education, Support, and Socialization      Discipline Responsible: Behavorial Health Tech      Signature:   Mely Nuñez

## 2022-10-07 NOTE — PLAN OF CARE
Problem: Chronic Conditions and Co-morbidities  Goal: Patient's chronic conditions and co-morbidity symptoms are monitored and maintained or improved  10/7/2022 1736 by Fanta Cao  Outcome: Progressing  Pt. Is medication complaint. Cooperative with vitals and blood sugar monitoring. Problem: Anxiety  Goal: Will report anxiety at manageable levels  Description: INTERVENTIONS:  1. Administer medication as ordered  2. Teach and rehearse alternative coping skills  3. Provide emotional support with 1:1 interaction with staff  10/7/2022 1736 by Fanta Cao  Outcome: Progressing  Pt is anxious often. Pt relates she has little patience when she is not getting her needs met. Pt became very nasty with staff this morning after limits were set with her demands. Pt selectively attends groups on unit. Problem: Confusion  Goal: Confusion, delirium, dementia, or psychosis is improved or at baseline  Description: INTERVENTIONS:  1. Assess for possible contributors to thought disturbance, including medications, impaired vision or hearing, underlying metabolic abnormalities, dehydration, psychiatric diagnoses, and notify attending LIP  2. [place] high risk fall precautions, as indicated  3. Provide frequent short contacts to provide reality reorientation, refocusing and direction  4. Decrease environmental stimuli, including noise as appropriate  5. Monitor and intervene to maintain adequate nutrition, hydration, elimination, sleep and activity  6. If unable to ensure safety without constant attention obtain sitter and review sitter guidelines with assigned personnel  7. Initiate Psychosocial CNS and Spiritual Care consult, as indicated  10/7/2022 1736 by Fanta Cao  Outcome: Progressing  Pt is reality based in conversation. Oriented to person, place and time. Pt. Remains safe on the unit. Q 15 minute checks for safety maintained.         Problem: Safety - Adult  Goal: Free from fall injury  10/7/2022 1736 by Maple   Outcome: Progressing  Pt. Remains free of falls. Up to wheelchair without incident. Pt. Remains safe on the unit. Q 15 minute checks for safety maintained. Problem: Pain  Goal: Verbalizes/displays adequate comfort level or baseline comfort level  10/7/2022 1736 by Maple   Outcome: Progressing  Pt relates to generalized pain and is accepting of medication ordered from doctor. Problem: Self Harm/Suicidality  Goal: Will have no self-injury during hospital stay  Description: INTERVENTIONS:  1. Q 30 MINUTES: Routine safety checks  2. Q SHIFT & PRN: Assess risk to determine if routine checks are adequate to maintain patient safety  10/7/2022 1736 by Maple   Outcome: Progressing   Pt. Denies suicidal thoughts. Pt. Remains safe on the unit. Q 15 minute checks for safety maintained.

## 2022-10-07 NOTE — GROUP NOTE
Group Therapy Note    Date: 10/7/2022    Group Start Time: 1330  Group End Time: 0808  Group Topic: Cognitive Skills    [facility] CHRISTINA Serrano; Levi Mensah    Cognitive Skills Group Note        Date: October 7, 2022 Start Time: 1:30pm  End Time: 2:10pm      Number of Participants in Group & Unit Census: 7/18    Topic: Cognitive Skills    Goal of Group: To improve coping skills and social skills. Comments:     Patient did not participate in Cognitive Skills group, despite staff encouragement and explanation of benefits. Patient remain seclusive to self. Q15 minute safety checks maintained for patient safety and will continue to encourage patient to attend unit programming.           Signature:  CHRISTINA Mensah

## 2022-10-07 NOTE — PROGRESS NOTES
[address]   INPATIENT OCCUPATIONAL THERAPY  PROGRESS NOTE  Date: 10/7/2022  Patient Name: Hilaria Desai      Room: 6846/9033-47  MRN: [id number]    : 1965  (56 y.o.) Gender: female     Discharge Recommendations:  Further Occupational Therapy is recommended upon facility discharge. OT Equipment Recommendations  Other: TBD       Diagnosis: psychosis, DM, Recent hx: DKA, Acute toxic encephalopathy , upper GI bleed. PMHx: lupus, DDD, White matter changes, CVA, bipolar disorder and PTSD, polysubstance abuse. treated for DKA with IV insulin, acute renal failure with substantial hydration, and hypovolemic shock requiring Levophed, severe hypernatremia >170, concern for GI bleed status post EGD which showed multiple duodenal ulcers treated with PPI. Patient remained confused and delusional, CT head negative, MRI could not be done due to lack of patient cooperation  General  Chart Reviewed: Yes  Patient assessed for rehabilitation services?: [place] to previous treatment: Patient with no complaints from previous session  Family / Caregiver Present: Yes  Diagnosis: psychosis, DM, Recent hx: DKA, Acute toxic encephalopathy , upper GI bleed. PMHx: lupus, DDD, White matter changes, CVA, bipolar disorder and PTSD, polysubstance abuse. treated for DKA with IV insulin, acute renal failure with substantial hydration, and hypovolemic shock requiring Levophed, severe hypernatremia >170, concern for GI bleed status post EGD which showed multiple duodenal ulcers treated with PPI. Patient remained confused and delusional, CT head negative, MRI could not be done due to lack of patient cooperation    Restrictions  Restrictions/Precautions: Fall Risk  Other position/activity restrictions: Will benefit from patient directed therapy approach- easily agitated. per chart- pt has wounds R foot, abdomen, bottom, under B breasts.   Required Braces or Orthoses?: No         Patient Education: Learner:patient  Method: demonstration       Outcome: acknowledged understanding      Plan  Occupational Therapy Plan  Times Per Week: 3-5      Goals  Short Term Goals  Time Frame for Short Term Goals: 1 week  Short Term Goal 1: pt to dudley 3-5 min standing during adls with 1 UE support and CGA. Short Term Goal 2: mod indep with toilet transfer (from w/c with BSC over toilet)  (if allowed on [facility])  Short Term Goal 3: set up bathe and dress with good cognition, safety and balance  Short Term Goal 4: pt to engage in leisure activity of her choice during OT and demo fair attention to task for 8 minutes. Short Term Goal 5: pt to ambulate with least restrictive device and CGA during adls ie. from bed to toilet and reverse (approx 20 feet)           Electronically signed by TORO Cohen on 10/7/22 at 7:31 AM EDT       10/03/22 1500   Chief Reason for Therapy   Chief Reason General Medical   Follow-up   REQUIRES OT FOLLOW-UP Yes   Restrictions/Precautions   Restrictions/Precautions Fall Risk   Required Braces or Orthoses? No   General   Chart Reviewed Yes   Additional Pertinent Hx Found unresponsive in home on 9/8 after family requested a well check. Pt was in DKA and had a BS of over 1060. She's been hospitalized since. Extensive history of alcohol use, cannabis and cocaine use recent. Hx of bipolar. Frequently confused, irritable. Refused to sign all paperwork upon admission. Irritable and agitated upon arrival. PT/OT consult for weak gait and difficulty standing from seated position. using w/c for mobility on I unit. Patient has previous admissions [facility] but nothing since 2018. Response to previous treatment Patient with no complaints from previous session   Family / Caregiver Present Yes   Subjective   Subjective \" I don't want to see you tomorrow, you better not come back! \" Pt states she does not want to work with therapy every day   General Comment   Comments RN OK'd pt's participation in therapy session this date. Subjective   Subjective Pt requires encouragment to participate in therpy this date   Cognition   Overall Cognitive Status Exceptions   Arousal/Alertness Appropriate responses to stimuli   Following Commands Follows one step commands consistently; Follows one step commands with increased time   Attention Span Attends with cues to redirect   Memory Decreased short term memory;Decreased recall of recent events;Decreased long term memory   Safety Judgement Decreased awareness of need for assistance;Decreased awareness of need for safety   Problem Solving Assistance required to identify errors made;Assistance required to generate solutions;Assistance required to implement solutions;Assistance required to correct errors made;Decreased awareness of errors   Insights Decreased awareness of deficits   Initiation Does not require cues   Sequencing Requires cues for some   Cognition Comment    ADL   Feeding Independent   Grooming Supervision   UE Bathing Supervision   LE Bathing Moderate assistance   UE Dressing Supervision   LE Dressing Moderate assistance   Toileting Minimal assistance   Additional Comments ADL scores based on skilled clinical observation only this date   Balance   Sitting Balance Supervision   Functional Mobility   Functional - Mobility Device Wheelchair   Activity Other   Assist Level Stand by assistance   Assessment   Assessment Pt completed leisure acitivty of playing cards. Pt requring cues for attention to task and cues to follow rules of game. Pt became frustrated multiple times throughout playing. Pt states she does not have any activites she enjoyes   Treatment Diagnosis Impaired self care status   Prognosis Guarded   Patient Education   Education Given To Patient   Education Provided Role of Therapy;Plan of Care;Transfer Training;Equipment; Fall Prevention Strategies;Precautions   Education Method Verbal   Barriers to [address]   Times Per Week 3-5 Safety Devices   Type of Devices Nurse notified; Left in chair   OT Whiteboard Notes   Therapy Whiteboard 10/3 OT tx. 9/27 OT tx; 9/26 OT tx. 9-24 eval done- qd 3-5/ week, SNF vs home with care. pt agitated, is using w/c on unit, at home was ambulatory. will benefit from patient directed therapy approach- easily agitated.  LR   OT Individual Minutes   Time In 9381   Time Out 1448   Minutes 30

## 2022-10-08 LAB
GLUCOSE BLD-MCNC: 192 MG/DL (ref 65–105)
GLUCOSE BLD-MCNC: 219 MG/DL (ref 65–105)
GLUCOSE BLD-MCNC: 233 MG/DL (ref 65–105)

## 2022-10-08 PROCEDURE — 6370000000 HC RX 637 (ALT 250 FOR IP): Performed by: INTERNAL MEDICINE

## 2022-10-08 PROCEDURE — 99232 SBSQ HOSP IP/OBS MODERATE 35: CPT | Performed by: PSYCHIATRY & NEUROLOGY

## 2022-10-08 PROCEDURE — 82947 ASSAY GLUCOSE BLOOD QUANT: CPT

## 2022-10-08 PROCEDURE — 6370000000 HC RX 637 (ALT 250 FOR IP): Performed by: PSYCHIATRY & NEUROLOGY

## 2022-10-08 PROCEDURE — 6370000000 HC RX 637 (ALT 250 FOR IP): Performed by: NURSE PRACTITIONER

## 2022-10-08 PROCEDURE — 1240000000 HC EMOTIONAL WELLNESS R&B

## 2022-10-08 RX ADMIN — INSULIN GLARGINE 38 UNITS: 100 INJECTION, SOLUTION SUBCUTANEOUS at 09:06

## 2022-10-08 RX ADMIN — METFORMIN HYDROCHLORIDE 850 MG: 850 TABLET ORAL at 17:31

## 2022-10-08 RX ADMIN — PREGABALIN 75 MG: 75 CAPSULE ORAL at 20:58

## 2022-10-08 RX ADMIN — PREGABALIN 75 MG: 75 CAPSULE ORAL at 09:07

## 2022-10-08 RX ADMIN — INSULIN GLARGINE 38 UNITS: 100 INJECTION, SOLUTION SUBCUTANEOUS at 20:57

## 2022-10-08 RX ADMIN — TRAZODONE HYDROCHLORIDE 50 MG: 50 TABLET ORAL at 20:58

## 2022-10-08 RX ADMIN — PANTOPRAZOLE SODIUM 40 MG: 40 TABLET, DELAYED RELEASE ORAL at 09:07

## 2022-10-08 RX ADMIN — HYDROXYZINE HYDROCHLORIDE 50 MG: 50 TABLET ORAL at 20:58

## 2022-10-08 RX ADMIN — INSULIN LISPRO 2 UNITS: 100 INJECTION, SOLUTION INTRAVENOUS; SUBCUTANEOUS at 12:36

## 2022-10-08 RX ADMIN — OLANZAPINE 15 MG: 10 TABLET, ORALLY DISINTEGRATING ORAL at 20:58

## 2022-10-08 RX ADMIN — ACETAMINOPHEN 650 MG: 325 TABLET, FILM COATED ORAL at 15:42

## 2022-10-08 RX ADMIN — PANTOPRAZOLE SODIUM 40 MG: 40 TABLET, DELAYED RELEASE ORAL at 20:58

## 2022-10-08 RX ADMIN — METFORMIN HYDROCHLORIDE 850 MG: 850 TABLET ORAL at 09:06

## 2022-10-08 RX ADMIN — ATORVASTATIN CALCIUM 40 MG: 20 TABLET, FILM COATED ORAL at 20:58

## 2022-10-08 ASSESSMENT — PAIN - FUNCTIONAL ASSESSMENT: PAIN_FUNCTIONAL_ASSESSMENT: ACTIVITIES ARE NOT PREVENTED

## 2022-10-08 ASSESSMENT — PAIN SCALES - GENERAL
PAINLEVEL_OUTOF10: 7
PAINLEVEL_OUTOF10: 3

## 2022-10-08 ASSESSMENT — PAIN DESCRIPTION - DESCRIPTORS: DESCRIPTORS: ACHING;STABBING

## 2022-10-08 ASSESSMENT — PAIN DESCRIPTION - ORIENTATION: ORIENTATION: RIGHT;LEFT

## 2022-10-08 ASSESSMENT — PAIN SCALES - WONG BAKER: WONGBAKER_NUMERICALRESPONSE: 0

## 2022-10-08 ASSESSMENT — PAIN DESCRIPTION - LOCATION: LOCATION: LEG

## 2022-10-08 ASSESSMENT — LIFESTYLE VARIABLES
HOW MANY STANDARD DRINKS CONTAINING ALCOHOL DO YOU HAVE ON A TYPICAL DAY: PATIENT DECLINED
HOW OFTEN DO YOU HAVE A DRINK CONTAINING ALCOHOL: NEVER

## 2022-10-08 NOTE — PLAN OF CARE
Pt states that she is ok and is working on her goals Pt currently denies all. Problem: Anxiety  Goal: Will report anxiety at manageable levels  Description: INTERVENTIONS:  1. Administer medication as ordered  2. Teach and rehearse alternative coping skills  3. Provide emotional support with 1:1 interaction with staff  Outcome: Rhoda Ambriz (Taken 10/8/2022 0944 by Kristen Saldaña RN)  Will report anxiety at manageable levels:   Teach and rehearse alternative coping skills   Administer medication as ordered   Provide emotional support with 1:1 interaction with staff     Problem: Confusion  Goal: Confusion, delirium, dementia, or psychosis is improved or at baseline  Description: INTERVENTIONS:  1. Assess for possible contributors to thought disturbance, including medications, impaired vision or hearing, underlying metabolic abnormalities, dehydration, psychiatric diagnoses, and notify attending LIP  2. [place] high risk fall precautions, as indicated  3. Provide frequent short contacts to provide reality reorientation, refocusing and direction  4. Decrease environmental stimuli, including noise as appropriate  5. Monitor and intervene to maintain adequate nutrition, hydration, elimination, sleep and activity  6. If unable to ensure safety without constant attention obtain sitter and review sitter guidelines with assigned personnel  7.  Initiate Psychosocial CNS and Spiritual Care consult, as indicated  Outcome: Progressing     Problem: Self Harm/Suicidality  Goal: Will have no self-injury during hospital stay  Description: INTERVENTIONS:  1. Q 30 MINUTES: Routine safety checks  2. Q SHIFT & PRN: Assess risk to determine if routine checks are adequate to maintain patient safety  Outcome: Progressing

## 2022-10-08 NOTE — GROUP NOTE
Group Therapy Note    Date: 10/8/2022    Group Start Time: 0900  Group End Time: 9971  Group Topic: Group Documentation    DANIELLE Paulino RN        Group Therapy Note    Attendees: 1/19    Community Meeting Group Note        Date: October 8, 2022 Start Time: 9am  End Time:  9:10am      Number of Participants in Group & Unit Census:  1/19    Topic: Goals group    Goal of Group:Establish attainable daily goal(s)      Comments:     Patient did not participate in [employer] group, despite staff encouragement and explanation of benefits. Patient remain seclusive to self. Q15 minute safety checks maintained for patient safety and will continue to encourage patient to attend unit programming.

## 2022-10-08 NOTE — PROGRESS NOTES
Daily Progress Note  10/8/2022    Patient Name: Genaro Parmar: Acute psychosis         SUBJECTIVE:    Patient was seen for follow-up assessment today. The patient appears to be in a good mood. She is denying suicidal ideation. She does not want to be in the hospital.  He agreed to go to the hospital.  We are waiting for acceptance at a nursing facility. She continues to have poor insight and memory difficulties    Appetite:  [x] Adequate/Unchanged  [] Increased  [] Decreased      Sleep:       [x] Adequate/Unchanged  [] Fair  [] Poor      Group Attendance on Unit:   [] Yes   [x] Selectively    [] No    Medication Side Effects: Denies          Mental Status Exam  Level of consciousness: Alert and awake   Appearance: Appropriate attire for setting, seated in wheelchair, with fair  grooming and hygiene   Behavior/Motor: Approachable, calm, tearful  Attitude toward examiner: cooperative, attentive, fair eye contact  Speech: Spontaneous, normal rate and volume, depressed tone  Mood: Euthymic  Affect: Congruent  Thought processes: Linear, coherent  Thought content: Denies homicidal ideation  Suicidal Ideation: Denies  Delusions: No evidence of delusions. Perceptual Disturbance: Patient does not appear to be responding to internal stimuli; denies AVH  Cognition: Oriented to self, location, and disorganized to time and situation  Memory: impaired  Insight: poor   Judgement: poor     Data   height is 5' 5\" (1.651 m) and weight is 207 lb (93.9 kg). Her oral temperature is 98.4 °F (36.9 °C). Her blood pressure is 105/64 and her pulse is 89. Her respiration is 14.    Labs:   Admission on 09/22/2022   Component Date Value Ref Range Status    POC Glucose 09/22/2022 135 (A)  65 - 105 mg/dL Final    POC Glucose 09/23/2022 168 (A)  65 - 105 mg/dL Final    WBC 09/23/2022 5.3  3.5 - 11.0 k/uL Final    RBC 09/23/2022 2.72 (A)  4.0 - 5.2 m/uL Final    Hemoglobin 09/23/2022 8.6 (A)  12.0 - 16.0 g/dL Final Hematocrit 09/23/2022 26.8 (A)  36 - 46 % Final    MCV 09/23/2022 98.8  80 - 100 fL Final    MCH 09/23/2022 31.8  26 - 34 pg Final    MCHC 09/23/2022 32.2  31 - 37 g/dL Final    RDW 09/23/2022 14.9  11.5 - 14.9 % Final    Platelets 77/86/9754 434  150 - 450 k/uL Final    MPV 09/23/2022 7.8  6.0 - 12.0 fL Final    Glucose 09/23/2022 315 (A)  70 - 99 mg/dL Final    BUN 09/23/2022 13  6 - 20 mg/dL Final    Creatinine 09/23/2022 0.78  0.50 - 0.90 mg/dL Final    Calcium 09/23/2022 8.7  8.6 - 10.4 mg/dL Final    Sodium 09/23/2022 135  135 - 144 mmol/L Final    Potassium 09/23/2022 4.8  3.7 - 5.3 mmol/L Final    Chloride 09/23/2022 101  98 - 107 mmol/L Final    CO2 09/23/2022 24  20 - 31 mmol/L Final    Anion Gap 09/23/2022 10  9 - 17 mmol/L Final    Alkaline Phosphatase 09/23/2022 243 (A)  35 - 104 U/L Final    ALT 09/23/2022 10  5 - 33 U/L Final    AST 09/23/2022 8  <32 U/L Final    Total Bilirubin 09/23/2022 0.2 (A)  0.3 - 1.2 mg/dL Final    Total Protein 09/23/2022 6.8  6.4 - 8.3 g/dL Final    Albumin 09/23/2022 2.7 (A)  3.5 - 5.2 g/dL Final    GFR Non- 09/23/2022 >60  >60 mL/min Final    GFR  09/23/2022 >60  >60 mL/min Final    GFR Comment 09/23/2022        Final    Comment: Average GFR for 52-63 years old:   80 mL/min/1.73sq m  Chronic Kidney Disease:   <60 mL/min/1.73sq m  Kidney failure:   <15 mL/min/1.73sq m              eGFR calculated using average adult body mass.  Additional eGFR calculator available at:        [employer]            POC Glucose 09/23/2022 222 (A)  65 - 105 mg/dL Final    POC Glucose 09/23/2022 286 (A)  65 - 105 mg/dL Final    POC Glucose 09/23/2022 237 (A)  65 - 105 mg/dL Final    POC Glucose 09/24/2022 185 (A)  65 - 105 mg/dL Final    POC Glucose 09/24/2022 177 (A)  65 - 105 mg/dL Final    POC Glucose 09/24/2022 241 (A)  65 - 105 mg/dL Final    POC Glucose 09/24/2022 221 (A)  65 - 105 mg/dL Final    POC Glucose 09/24/2022 248 (A)  65 - 105 mg/dL Final    Hepatitis C Ab 09/26/2022 NONREACTIVE  NONREACTIVE Final    Comment:       The hepatitis C procedure used in our laboratory is a Chemiluminescent test specific for   three recombinant HCV antigens. A negative anti-HCV result indicates that the antibodies to   hepatitis C virus are not present at this time. Individuals with reactive anti-HCV should be considered infected and infectious until proven   otherwise. Confirmation of all equivocal or reactive results is recommended by ordering   HCV RNA by PCR. POC Glucose 09/25/2022 142 (A)  65 - 105 mg/dL Final    POC Glucose 09/25/2022 204 (A)  65 - 105 mg/dL Final    POC Glucose 09/25/2022 255 (A)  65 - 105 mg/dL Final    POC Glucose 09/25/2022 224 (A)  65 - 105 mg/dL Final    HIV Ag/Ab 09/26/2022 NONREACTIVE  NONREACTIVE Final    Comment: No laboratory evidence of HIV infection. If acute HIV infection is suspected, consider   testing for HIV-1 RNA.       POC Glucose 09/26/2022 140 (A)  65 - 105 mg/dL Final    POC Glucose 09/26/2022 248 (A)  65 - 105 mg/dL Final    POC Glucose 09/26/2022 279 (A)  65 - 105 mg/dL Final    POC Glucose 09/26/2022 331 (A)  65 - 105 mg/dL Final    POC Glucose 09/27/2022 196 (A)  65 - 105 mg/dL Final    POC Glucose 09/27/2022 227 (A)  65 - 105 mg/dL Final    POC Glucose 09/27/2022 311 (A)  65 - 105 mg/dL Final    POC Glucose 09/27/2022 295 (A)  65 - 105 mg/dL Final    POC Glucose 09/27/2022 301 (A)  65 - 105 mg/dL Final    POC Glucose 09/28/2022 267 (A)  65 - 105 mg/dL Final    POC Glucose 09/28/2022 334 (A)  65 - 105 mg/dL Final    POC Glucose 09/28/2022 270 (A)  65 - 105 mg/dL Final    POC Glucose 09/28/2022 248 (A)  65 - 105 mg/dL Final    POC Glucose 09/28/2022 251 (A)  65 - 105 mg/dL Final    POC Glucose 09/28/2022 235 (A)  65 - 105 mg/dL Final    Critical Noted    POC Glucose 09/28/2022 323 (A)  65 - 105 mg/dL Final    POC Glucose 09/28/2022 475 (A)  65 - 105 mg/dL Final    Critical Noted    POC Glucose 09/28/2022 452 (A)  65 - 105 mg/dL Final    Critical Noted    POC Glucose 09/28/2022 423 (A)  65 - 105 mg/dL Final    Critical Noted    Glucose 09/29/2022 299 (A)  70 - 99 mg/dL Final    BUN 09/29/2022 14  6 - 20 mg/dL Final    Creatinine 09/29/2022 1.05 (A)  0.50 - 0.90 mg/dL Final    Calcium 09/29/2022 8.4 (A)  8.6 - 10.4 mg/dL Final    Sodium 09/29/2022 138  135 - 144 mmol/L Final    Potassium 09/29/2022 5.0  3.7 - 5.3 mmol/L Final    Chloride 09/29/2022 103  98 - 107 mmol/L Final    CO2 09/29/2022 26  20 - 31 mmol/L Final    Anion Gap 09/29/2022 9  9 - 17 mmol/L Final    GFR Non- 09/29/2022 54 (A)  >60 mL/min Final    GFR  09/29/2022 >60  >60 mL/min Final    GFR Comment 09/29/2022        Final    Comment: Average GFR for 52-63 years old:   80 mL/min/1.73sq m  Chronic Kidney Disease:   <60 mL/min/1.73sq m  Kidney failure:   <15 mL/min/1.73sq m              eGFR calculated using average adult body mass.  Additional eGFR calculator available at:        [email or web address]            POC Glucose 09/29/2022 153 (A)  65 - 105 mg/dL Final    POC Glucose 09/29/2022 244 (A)  65 - 105 mg/dL Final    POC Glucose 09/29/2022 172 (A)  65 - 105 mg/dL Final    POC Glucose 09/30/2022 162 (A)  65 - 105 mg/dL Final    POC Glucose 09/29/2022 250 (A)  65 - 105 mg/dL Final    POC Glucose 09/30/2022 262 (A)  65 - 105 mg/dL Final    POC Glucose 09/30/2022 199 (A)  65 - 105 mg/dL Final    POC Glucose 09/30/2022 239 (A)  65 - 105 mg/dL Final    POC Glucose 10/01/2022 122 (A)  65 - 105 mg/dL Final    POC Glucose 10/01/2022 179 (A)  65 - 105 mg/dL Final    POC Glucose 10/01/2022 167 (A)  65 - 105 mg/dL Final    POC Glucose 10/01/2022 185 (A)  65 - 105 mg/dL Final    POC Glucose 10/02/2022 166 (A)  65 - 105 mg/dL Final    POC Glucose 10/02/2022 271 (A)  65 - 105 mg/dL Final    POC Glucose 10/02/2022 324 (A)  65 - 105 mg/dL Final    POC Glucose 10/02/2022 315 (A)  65 - 105 mg/dL Final    POC Glucose 10/03/2022 232 (A)  65 - 105 mg/dL Final    POC Glucose 10/03/2022 407 (A)  65 - 105 mg/dL Final    Critical Noted    POC Glucose 10/03/2022 371 (A)  65 - 105 mg/dL Final    POC Glucose 10/03/2022 410 (A)  65 - 105 mg/dL Final    Critical Noted    POC Glucose 10/04/2022 212 (A)  65 - 105 mg/dL Final    Glucose 10/04/2022 505 (A)  70 - 99 mg/dL Final    BUN 10/04/2022 13  6 - 20 mg/dL Final    Creatinine 10/04/2022 1.14 (A)  0.50 - 0.90 mg/dL Final    Calcium 10/04/2022 8.8  8.6 - 10.4 mg/dL Final    Sodium 10/04/2022 133 (A)  135 - 144 mmol/L Final    Potassium 10/04/2022 5.6 (A)  3.7 - 5.3 mmol/L Final    Chloride 10/04/2022 98  98 - 107 mmol/L Final    CO2 10/04/2022 26  20 - 31 mmol/L Final    Anion Gap 10/04/2022 9  9 - 17 mmol/L Final    Est, Glom Filt Rate 10/04/2022 57 (A)  >60 mL/min/1.73m2 Final    Comment:       Effective Oct 3, 2022        These results are not intended for use in patients <25years of age. eGFR results are calculated without a race factor using the 2021 CKD-EPI equation. Careful clinical correlation is recommended, particularly when comparing to results   calculated using previous equations. The CKD-EPI equation is less accurate in patients with extremes of muscle mass, extra-renal   metabolism of creatine, excessive creatine ingestion, or following therapy that affects   renal tubular secretion.       POC Glucose 10/04/2022 388 (A)  65 - 105 mg/dL Final    POC Glucose 10/04/2022 332 (A)  65 - 105 mg/dL Final    POC Glucose 10/04/2022 337 (A)  65 - 105 mg/dL Final    POC Glucose 10/05/2022 249 (A)  65 - 105 mg/dL Final    POC Glucose 10/05/2022 384 (A)  65 - 105 mg/dL Final    POC Glucose 10/05/2022 223 (A)  65 - 105 mg/dL Final    Glucose 10/06/2022 227 (A)  70 - 99 mg/dL Final    BUN 10/06/2022 16  6 - 20 mg/dL Final    Creatinine 10/06/2022 0.89  0.50 - 0.90 mg/dL Final    Est, Glom Filt Rate 10/06/2022 >60  >60 mL/min/1.73m2 Final    Comment: Effective Oct 3, 2022        These results are not intended for use in patients <25years of age. eGFR results are calculated without a race factor using the 2021 CKD-EPI equation. Careful clinical correlation is recommended, particularly when comparing to results   calculated using previous equations. The CKD-EPI equation is less accurate in patients with extremes of muscle mass, extra-renal   metabolism of creatine, excessive creatine ingestion, or following therapy that affects   renal tubular secretion. Calcium 10/06/2022 8.7  8.6 - 10.4 mg/dL Final    Sodium 10/06/2022 139  135 - 144 mmol/L Final    Potassium 10/06/2022 4.6  3.7 - 5.3 mmol/L Final    Chloride 10/06/2022 105  98 - 107 mmol/L Final    CO2 10/06/2022 26  20 - 31 mmol/L Final    Anion Gap 10/06/2022 8 (A)  9 - 17 mmol/L Final    Hemoglobin A1C 10/06/2022 8.0 (A)  4.0 - 6.0 % Final    Estimated Avg Glucose 10/06/2022 183  mg/dL Final    Comment: The ADA and AACC recommend providing the estimated average glucose result to permit better   patient understanding of their HBA1c result. POC Glucose 10/05/2022 304 (A)  65 - 105 mg/dL Final    POC Glucose 10/06/2022 209 (A)  65 - 105 mg/dL Final    POC Glucose 10/06/2022 286 (A)  65 - 105 mg/dL Final    POC Glucose 10/06/2022 286 (A)  65 - 105 mg/dL Final    POC Glucose 10/06/2022 319 (A)  65 - 105 mg/dL Final    POC Glucose 10/07/2022 159 (A)  65 - 105 mg/dL Final    POC Glucose 10/07/2022 349 (A)  65 - 105 mg/dL Final    POC Glucose 10/07/2022 192 (A)  65 - 105 mg/dL Final    POC Glucose 10/07/2022 245 (A)  65 - 105 mg/dL Final    POC Glucose 10/08/2022 233 (A)  65 - 105 mg/dL Final    POC Glucose 10/08/2022 192 (A)  65 - 105 mg/dL Final         Reviewed patient's current plan of care and vital signs with nursing staff.     Labs reviewed: [x] Yes  , K5.6, EST going for rate 57, POC glucose 38, Creat 1.14  Medications  Current Facility-Administered Medications: insulin glargine (LANTUS) injection vial 38 Units, 38 Units, SubCUTAneous, BID  insulin lispro (HUMALOG) injection vial 0-8 Units, 0-8 Units, SubCUTAneous, TID WC  insulin lispro (HUMALOG) injection vial 0-4 Units, 0-4 Units, SubCUTAneous, Nightly  metFORMIN (GLUCOPHAGE) tablet 850 mg, 850 mg, Oral, BID WC  albuterol sulfate HFA (PROVENTIL;VENTOLIN;PROAIR) 108 (90 Base) MCG/ACT inhaler 2 puff, 2 puff, Inhalation, Q4H PRN  albuterol (PROVENTIL) nebulizer solution 2.5 mg, 2.5 mg, Nebulization, Q6H PRN  rivastigmine (EXELON) 4.6 MG/24HR 1 patch, 1 patch, TransDERmal, Daily  lidocaine 4 % external patch 1 patch, 1 patch, TransDERmal, Daily  atorvastatin (LIPITOR) tablet 40 mg, 40 mg, Oral, Nightly  glucagon (rDNA) injection 1 mg, 1 mg, SubCUTAneous, PRN  glucose chewable tablet 16 g, 4 tablet, Oral, PRN  OLANZapine zydis (ZYPREXA) disintegrating tablet 15 mg, 15 mg, Oral, Nightly  pantoprazole (PROTONIX) tablet 40 mg, 40 mg, Oral, BID  pregabalin (LYRICA) capsule 75 mg, 75 mg, Oral, BID  acetaminophen (TYLENOL) tablet 650 mg, 650 mg, Oral, Q6H PRN  ibuprofen (ADVIL;MOTRIN) tablet 400 mg, 400 mg, Oral, Q6H PRN  hydrOXYzine HCl (ATARAX) tablet 50 mg, 50 mg, Oral, TID PRN  traZODone (DESYREL) tablet 50 mg, 50 mg, Oral, Nightly PRN  polyethylene glycol (GLYCOLAX) packet 17 g, 17 g, Oral, Daily PRN  aluminum & magnesium hydroxide-simethicone (MAALOX) 200-200-20 MG/5ML suspension 30 mL, 30 mL, Oral, Q6H PRN  nicotine polacrilex (NICORETTE) gum 2 mg, 2 mg, Oral, Q2H PRN  haloperidol lactate (HALDOL) injection 5 mg, 5 mg, IntraMUSCular, Q6H PRN **AND** LORazepam (ATIVAN) injection 2 mg, 2 mg, IntraMUSCular, Q6H PRN **AND** diphenhydrAMINE (BENADRYL) injection 50 mg, 50 mg, IntraMUSCular, Q6H PRN    ASSESSMENT  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    Psycho-education conducted. Supportive Therapy conducted.   Follow-up daily while on inpatient unit  Electronically signed by Kota Guerrero MD on 10/8/2022 at 5:39 PM

## 2022-10-08 NOTE — PLAN OF CARE
Problem: Chronic Conditions and Co-morbidities  Goal: Patient's chronic conditions and co-morbidity symptoms are monitored and maintained or improved  10/8/2022 0043 by Memo Estrada RN  Outcome: Progressing  Patient blood sugar was 245 tonight. Problem: Anxiety  Goal: Will report anxiety at manageable levels  Description: INTERVENTIONS:  1. Administer medication as ordered  2. Teach and rehearse alternative coping skills  3. Provide emotional support with 1:1 interaction with staff  10/8/2022 0043 by Memo Estrada RN  Outcome: Progressing  Patient continues to be anxious, related to not knowing where she is going after discharge. Patient denies suicidal ideation, homicidal ideation and hallucinations at this time. Safety plan reviewed with patient, agrees to approach staff when feeling upset. 15 minute and random checks maintained for safety. No violent or escalating behaviors noted during this shift. Patient is currently calm, controlled and medication-compliant        Problem: Confusion  Goal: Confusion, delirium, dementia, or psychosis is improved or at baseline  Description: INTERVENTIONS:  1. Assess for possible contributors to thought disturbance, including medications, impaired vision or hearing, underlying metabolic abnormalities, dehydration, psychiatric diagnoses, and notify attending LIP  2. [place] high risk fall precautions, as indicated  3. Provide frequent short contacts to provide reality reorientation, refocusing and direction  4. Decrease environmental stimuli, including noise as appropriate  5. Monitor and intervene to maintain adequate nutrition, hydration, elimination, sleep and activity  6. If unable to ensure safety without constant attention obtain sitter and review sitter guidelines with assigned personnel  7.  Initiate Psychosocial CNS and Spiritual Care consult, as indicated  10/8/2022 0043 by Memo Estrada RN  Outcome: Progressing  Patient is alert and oriented to self, time, place and situation. She struggles to remember her medications and frequently requests medication that are not available to her. Problem: Safety - Adult  Goal: Free from fall injury  10/8/2022 0043 by Juan Allen RN  Outcome: Progressing  Patient remains free of falls and verbalizes understanding of individual fall risks. Patient is wearing non-skid footwear and encouraged to seek out staff for any assistance needed. Problem: Pain  Goal: Verbalizes/displays adequate comfort level or baseline comfort level  10/8/2022 0043 by Juan Allen RN  Outcome: Progressing  Patient reports chronic pain that is not relieved by available medications. Problem: Self Harm/Suicidality  Goal: Will have no self-injury during hospital stay  Description: INTERVENTIONS:  1. Q 30 MINUTES: Routine safety checks  2. Q SHIFT & PRN: Assess risk to determine if routine checks are adequate to maintain patient safety  10/8/2022 0043 by Juan Allen RN  Outcome: Progressing  Patient has made no attempt to harm self at this time.

## 2022-10-09 LAB
GLUCOSE BLD-MCNC: 138 MG/DL (ref 65–105)
GLUCOSE BLD-MCNC: 197 MG/DL (ref 65–105)
GLUCOSE BLD-MCNC: 242 MG/DL (ref 65–105)
GLUCOSE BLD-MCNC: 252 MG/DL (ref 65–105)

## 2022-10-09 PROCEDURE — 6370000000 HC RX 637 (ALT 250 FOR IP): Performed by: PSYCHIATRY & NEUROLOGY

## 2022-10-09 PROCEDURE — 82947 ASSAY GLUCOSE BLOOD QUANT: CPT

## 2022-10-09 PROCEDURE — 1240000000 HC EMOTIONAL WELLNESS R&B

## 2022-10-09 PROCEDURE — APPSS30 APP SPLIT SHARED TIME 16-30 MINUTES

## 2022-10-09 PROCEDURE — 99232 SBSQ HOSP IP/OBS MODERATE 35: CPT | Performed by: PSYCHIATRY & NEUROLOGY

## 2022-10-09 PROCEDURE — 6370000000 HC RX 637 (ALT 250 FOR IP): Performed by: INTERNAL MEDICINE

## 2022-10-09 PROCEDURE — 6370000000 HC RX 637 (ALT 250 FOR IP): Performed by: NURSE PRACTITIONER

## 2022-10-09 RX ADMIN — METFORMIN HYDROCHLORIDE 850 MG: 850 TABLET ORAL at 09:40

## 2022-10-09 RX ADMIN — ACETAMINOPHEN 650 MG: 325 TABLET, FILM COATED ORAL at 09:49

## 2022-10-09 RX ADMIN — INSULIN GLARGINE 38 UNITS: 100 INJECTION, SOLUTION SUBCUTANEOUS at 09:50

## 2022-10-09 RX ADMIN — PANTOPRAZOLE SODIUM 40 MG: 40 TABLET, DELAYED RELEASE ORAL at 09:40

## 2022-10-09 RX ADMIN — INSULIN LISPRO 2 UNITS: 100 INJECTION, SOLUTION INTRAVENOUS; SUBCUTANEOUS at 11:37

## 2022-10-09 RX ADMIN — PREGABALIN 75 MG: 75 CAPSULE ORAL at 20:00

## 2022-10-09 RX ADMIN — IBUPROFEN 400 MG: 400 TABLET ORAL at 14:44

## 2022-10-09 RX ADMIN — HYDROXYZINE HYDROCHLORIDE 50 MG: 50 TABLET ORAL at 20:00

## 2022-10-09 RX ADMIN — PANTOPRAZOLE SODIUM 40 MG: 40 TABLET, DELAYED RELEASE ORAL at 20:00

## 2022-10-09 RX ADMIN — INSULIN GLARGINE 38 UNITS: 100 INJECTION, SOLUTION SUBCUTANEOUS at 20:00

## 2022-10-09 RX ADMIN — IBUPROFEN 400 MG: 400 TABLET ORAL at 20:00

## 2022-10-09 RX ADMIN — TRAZODONE HYDROCHLORIDE 50 MG: 50 TABLET ORAL at 20:00

## 2022-10-09 RX ADMIN — OLANZAPINE 15 MG: 10 TABLET, ORALLY DISINTEGRATING ORAL at 20:00

## 2022-10-09 RX ADMIN — ATORVASTATIN CALCIUM 40 MG: 20 TABLET, FILM COATED ORAL at 20:00

## 2022-10-09 RX ADMIN — METFORMIN HYDROCHLORIDE 850 MG: 850 TABLET ORAL at 16:59

## 2022-10-09 RX ADMIN — PREGABALIN 75 MG: 75 CAPSULE ORAL at 09:40

## 2022-10-09 ASSESSMENT — PAIN SCALES - WONG BAKER
WONGBAKER_NUMERICALRESPONSE: 2
WONGBAKER_NUMERICALRESPONSE: 2

## 2022-10-09 ASSESSMENT — PAIN DESCRIPTION - LOCATION
LOCATION: LEG
LOCATION: LEG
LOCATION: BACK;LEG

## 2022-10-09 ASSESSMENT — PAIN SCALES - GENERAL
PAINLEVEL_OUTOF10: 2
PAINLEVEL_OUTOF10: 7
PAINLEVEL_OUTOF10: 2
PAINLEVEL_OUTOF10: 6
PAINLEVEL_OUTOF10: 5

## 2022-10-09 ASSESSMENT — LIFESTYLE VARIABLES
HOW OFTEN DO YOU HAVE A DRINK CONTAINING ALCOHOL: NEVER
HOW MANY STANDARD DRINKS CONTAINING ALCOHOL DO YOU HAVE ON A TYPICAL DAY: PATIENT DECLINED

## 2022-10-09 ASSESSMENT — PAIN DESCRIPTION - DESCRIPTORS
DESCRIPTORS: ACHING;THROBBING
DESCRIPTORS: ACHING;THROBBING

## 2022-10-09 ASSESSMENT — PAIN DESCRIPTION - ORIENTATION
ORIENTATION: RIGHT;LEFT
ORIENTATION: RIGHT;LEFT

## 2022-10-09 NOTE — PROGRESS NOTES
Daily Progress Note  10/9/2022    Patient Name: Zenobia Gauthier: Acute psychosis         SUBJECTIVE:    Patient was seen for follow-up assessment today. Patient has been compliant with scheduled medications at this time has not required emergency medications in the past 24 hours. When approached. The patient is overall pleasant and agreeable to conversation. Patient does endorse significant depression however she relates it to being admitted to the hospital.  Patient states that she has been going to groups and has been benefiting from hospitalization through interactions with peers. Patient is currently denying suicidal ideation and denies homicidal ideation. Patient is denying medication side effects. Patient does continue to present with poor insight and memory difficulties. Appetite:  [x] Adequate/Unchanged  [] Increased  [] Decreased      Sleep:       [x] Adequate/Unchanged  [] Fair  [] Poor      Group Attendance on Unit:   [] Yes   [x] Selectively    [] No    Medication Side Effects: Denies          Mental Status Exam  Level of consciousness: Alert and awake   Appearance: Appropriate attire for setting, seated in wheelchair, with fair  grooming and hygiene   Behavior/Motor: Approachable, calm  Attitude toward examiner: cooperative, attentive, herman mood good eye contact  Speech: Spontaneous, normal rate, volume, tone  Mood: Okay  Affect: Congruent  Thought processes: Linear, coherent  Thought content: Denies homicidal ideation  Suicidal Ideation: Denies  Delusions: No evidence of delusions. Perceptual Disturbance: Patient does not appear to be responding to internal stimuli; denies AVH  Cognition: Oriented to self, location, and disorganized to time and situation  Memory: impaired  Insight: poor   Judgement: poor     Data   height is 5' 5\" (1.651 m) and weight is 207 lb (93.9 kg). Her oral temperature is 97.7 °F (36.5 °C). Her blood pressure is 114/68 and her pulse is 99. Her respiration is 14. Labs:   Admission on 09/22/2022   Component Date Value Ref Range Status    POC Glucose 09/22/2022 135 (A)  65 - 105 mg/dL Final    POC Glucose 09/23/2022 168 (A)  65 - 105 mg/dL Final    WBC 09/23/2022 5.3  3.5 - 11.0 k/uL Final    RBC 09/23/2022 2.72 (A)  4.0 - 5.2 m/uL Final    Hemoglobin 09/23/2022 8.6 (A)  12.0 - 16.0 g/dL Final    Hematocrit 09/23/2022 26.8 (A)  36 - 46 % Final    MCV 09/23/2022 98.8  80 - 100 fL Final    MCH 09/23/2022 31.8  26 - 34 pg Final    MCHC 09/23/2022 32.2  31 - 37 g/dL Final    RDW 09/23/2022 14.9  11.5 - 14.9 % Final    Platelets 75/76/0706 434  150 - 450 k/uL Final    MPV 09/23/2022 7.8  6.0 - 12.0 fL Final    Glucose 09/23/2022 315 (A)  70 - 99 mg/dL Final    BUN 09/23/2022 13  6 - 20 mg/dL Final    Creatinine 09/23/2022 0.78  0.50 - 0.90 mg/dL Final    Calcium 09/23/2022 8.7  8.6 - 10.4 mg/dL Final    Sodium 09/23/2022 135  135 - 144 mmol/L Final    Potassium 09/23/2022 4.8  3.7 - 5.3 mmol/L Final    Chloride 09/23/2022 101  98 - 107 mmol/L Final    CO2 09/23/2022 24  20 - 31 mmol/L Final    Anion Gap 09/23/2022 10  9 - 17 mmol/L Final    Alkaline Phosphatase 09/23/2022 243 (A)  35 - 104 U/L Final    ALT 09/23/2022 10  5 - 33 U/L Final    AST 09/23/2022 8  <32 U/L Final    Total Bilirubin 09/23/2022 0.2 (A)  0.3 - 1.2 mg/dL Final    Total Protein 09/23/2022 6.8  6.4 - 8.3 g/dL Final    Albumin 09/23/2022 2.7 (A)  3.5 - 5.2 g/dL Final    GFR Non- 09/23/2022 >60  >60 mL/min Final    GFR  09/23/2022 >60  >60 mL/min Final    GFR Comment 09/23/2022        Final    Comment: Average GFR for 52-63 years old:   80 mL/min/1.73sq m  Chronic Kidney Disease:   <60 mL/min/1.73sq m  Kidney failure:   <15 mL/min/1.73sq m              eGFR calculated using average adult body mass.  Additional eGFR calculator available at:        [email or web address]            POC Glucose 09/23/2022 222 (A)  65 - 105 mg/dL Final POC Glucose 09/23/2022 286 (A)  65 - 105 mg/dL Final    POC Glucose 09/23/2022 237 (A)  65 - 105 mg/dL Final    POC Glucose 09/24/2022 185 (A)  65 - 105 mg/dL Final    POC Glucose 09/24/2022 177 (A)  65 - 105 mg/dL Final    POC Glucose 09/24/2022 241 (A)  65 - 105 mg/dL Final    POC Glucose 09/24/2022 221 (A)  65 - 105 mg/dL Final    POC Glucose 09/24/2022 248 (A)  65 - 105 mg/dL Final    Hepatitis C Ab 09/26/2022 NONREACTIVE  NONREACTIVE Final    Comment:       The hepatitis C procedure used in our laboratory is a Chemiluminescent test specific for   three recombinant HCV antigens. A negative anti-HCV result indicates that the antibodies to   hepatitis C virus are not present at this time. Individuals with reactive anti-HCV should be considered infected and infectious until proven   otherwise. Confirmation of all equivocal or reactive results is recommended by ordering   HCV RNA by PCR. POC Glucose 09/25/2022 142 (A)  65 - 105 mg/dL Final    POC Glucose 09/25/2022 204 (A)  65 - 105 mg/dL Final    POC Glucose 09/25/2022 255 (A)  65 - 105 mg/dL Final    POC Glucose 09/25/2022 224 (A)  65 - 105 mg/dL Final    HIV Ag/Ab 09/26/2022 NONREACTIVE  NONREACTIVE Final    Comment: No laboratory evidence of HIV infection. If acute HIV infection is suspected, consider   testing for HIV-1 RNA.       POC Glucose 09/26/2022 140 (A)  65 - 105 mg/dL Final    POC Glucose 09/26/2022 248 (A)  65 - 105 mg/dL Final    POC Glucose 09/26/2022 279 (A)  65 - 105 mg/dL Final    POC Glucose 09/26/2022 331 (A)  65 - 105 mg/dL Final    POC Glucose 09/27/2022 196 (A)  65 - 105 mg/dL Final    POC Glucose 09/27/2022 227 (A)  65 - 105 mg/dL Final    POC Glucose 09/27/2022 311 (A)  65 - 105 mg/dL Final    POC Glucose 09/27/2022 295 (A)  65 - 105 mg/dL Final    POC Glucose 09/27/2022 301 (A)  65 - 105 mg/dL Final    POC Glucose 09/28/2022 267 (A)  65 - 105 mg/dL Final    POC Glucose 09/28/2022 334 (A)  65 - 105 mg/dL Final    POC Glucose 09/28/2022 270 (A)  65 - 105 mg/dL Final    POC Glucose 09/28/2022 248 (A)  65 - 105 mg/dL Final    POC Glucose 09/28/2022 251 (A)  65 - 105 mg/dL Final    POC Glucose 09/28/2022 235 (A)  65 - 105 mg/dL Final    Critical Noted    POC Glucose 09/28/2022 323 (A)  65 - 105 mg/dL Final    POC Glucose 09/28/2022 475 (A)  65 - 105 mg/dL Final    Critical Noted    POC Glucose 09/28/2022 452 (A)  65 - 105 mg/dL Final    Critical Noted    POC Glucose 09/28/2022 423 (A)  65 - 105 mg/dL Final    Critical Noted    Glucose 09/29/2022 299 (A)  70 - 99 mg/dL Final    BUN 09/29/2022 14  6 - 20 mg/dL Final    Creatinine 09/29/2022 1.05 (A)  0.50 - 0.90 mg/dL Final    Calcium 09/29/2022 8.4 (A)  8.6 - 10.4 mg/dL Final    Sodium 09/29/2022 138  135 - 144 mmol/L Final    Potassium 09/29/2022 5.0  3.7 - 5.3 mmol/L Final    Chloride 09/29/2022 103  98 - 107 mmol/L Final    CO2 09/29/2022 26  20 - 31 mmol/L Final    Anion Gap 09/29/2022 9  9 - 17 mmol/L Final    GFR Non- 09/29/2022 54 (A)  >60 mL/min Final    GFR  09/29/2022 >60  >60 mL/min Final    GFR Comment 09/29/2022        Final    Comment: Average GFR for 52-63 years old:   80 mL/min/1.73sq m  Chronic Kidney Disease:   <60 mL/min/1.73sq m  Kidney failure:   <15 mL/min/1.73sq m              eGFR calculated using average adult body mass.  Additional eGFR calculator available at:        [facility]            POC Glucose 09/29/2022 153 (A)  65 - 105 mg/dL Final    POC Glucose 09/29/2022 244 (A)  65 - 105 mg/dL Final    POC Glucose 09/29/2022 172 (A)  65 - 105 mg/dL Final    POC Glucose 09/30/2022 162 (A)  65 - 105 mg/dL Final    POC Glucose 09/29/2022 250 (A)  65 - 105 mg/dL Final    POC Glucose 09/30/2022 262 (A)  65 - 105 mg/dL Final    POC Glucose 09/30/2022 199 (A)  65 - 105 mg/dL Final    POC Glucose 09/30/2022 239 (A)  65 - 105 mg/dL Final    POC Glucose 10/01/2022 122 (A)  65 - 105 mg/dL Final    POC Glucose 10/01/2022 179 (A)  65 - 105 mg/dL Final    POC Glucose 10/01/2022 167 (A)  65 - 105 mg/dL Final    POC Glucose 10/01/2022 185 (A)  65 - 105 mg/dL Final    POC Glucose 10/02/2022 166 (A)  65 - 105 mg/dL Final    POC Glucose 10/02/2022 271 (A)  65 - 105 mg/dL Final    POC Glucose 10/02/2022 324 (A)  65 - 105 mg/dL Final    POC Glucose 10/02/2022 315 (A)  65 - 105 mg/dL Final    POC Glucose 10/03/2022 232 (A)  65 - 105 mg/dL Final    POC Glucose 10/03/2022 407 (A)  65 - 105 mg/dL Final    Critical Noted    POC Glucose 10/03/2022 371 (A)  65 - 105 mg/dL Final    POC Glucose 10/03/2022 410 (A)  65 - 105 mg/dL Final    Critical Noted    POC Glucose 10/04/2022 212 (A)  65 - 105 mg/dL Final    Glucose 10/04/2022 505 (A)  70 - 99 mg/dL Final    BUN 10/04/2022 13  6 - 20 mg/dL Final    Creatinine 10/04/2022 1.14 (A)  0.50 - 0.90 mg/dL Final    Calcium 10/04/2022 8.8  8.6 - 10.4 mg/dL Final    Sodium 10/04/2022 133 (A)  135 - 144 mmol/L Final    Potassium 10/04/2022 5.6 (A)  3.7 - 5.3 mmol/L Final    Chloride 10/04/2022 98  98 - 107 mmol/L Final    CO2 10/04/2022 26  20 - 31 mmol/L Final    Anion Gap 10/04/2022 9  9 - 17 mmol/L Final    Est, Glom Filt Rate 10/04/2022 57 (A)  >60 mL/min/1.73m2 Final    Comment:       Effective Oct 3, 2022        These results are not intended for use in patients <25years of age. eGFR results are calculated without a race factor using the 2021 CKD-EPI equation. Careful clinical correlation is recommended, particularly when comparing to results   calculated using previous equations. The CKD-EPI equation is less accurate in patients with extremes of muscle mass, extra-renal   metabolism of creatine, excessive creatine ingestion, or following therapy that affects   renal tubular secretion.       POC Glucose 10/04/2022 388 (A)  65 - 105 mg/dL Final    POC Glucose 10/04/2022 332 (A)  65 - 105 mg/dL Final    POC Glucose 10/04/2022 337 (A)  65 - 105 mg/dL Final    POC Glucose 10/05/2022 249 (A)  65 - 105 mg/dL Final    POC Glucose 10/05/2022 384 (A)  65 - 105 mg/dL Final    POC Glucose 10/05/2022 223 (A)  65 - 105 mg/dL Final    Glucose 10/06/2022 227 (A)  70 - 99 mg/dL Final    BUN 10/06/2022 16  6 - 20 mg/dL Final    Creatinine 10/06/2022 0.89  0.50 - 0.90 mg/dL Final    Est, Glom Filt Rate 10/06/2022 >60  >60 mL/min/1.73m2 Final    Comment:       Effective Oct 3, 2022        These results are not intended for use in patients <25years of age. eGFR results are calculated without a race factor using the 2021 CKD-EPI equation. Careful clinical correlation is recommended, particularly when comparing to results   calculated using previous equations. The CKD-EPI equation is less accurate in patients with extremes of muscle mass, extra-renal   metabolism of creatine, excessive creatine ingestion, or following therapy that affects   renal tubular secretion. Calcium 10/06/2022 8.7  8.6 - 10.4 mg/dL Final    Sodium 10/06/2022 139  135 - 144 mmol/L Final    Potassium 10/06/2022 4.6  3.7 - 5.3 mmol/L Final    Chloride 10/06/2022 105  98 - 107 mmol/L Final    CO2 10/06/2022 26  20 - 31 mmol/L Final    Anion Gap 10/06/2022 8 (A)  9 - 17 mmol/L Final    Hemoglobin A1C 10/06/2022 8.0 (A)  4.0 - 6.0 % Final    Estimated Avg Glucose 10/06/2022 183  mg/dL Final    Comment: The ADA and AACC recommend providing the estimated average glucose result to permit better   patient understanding of their HBA1c result.       POC Glucose 10/05/2022 304 (A)  65 - 105 mg/dL Final    POC Glucose 10/06/2022 209 (A)  65 - 105 mg/dL Final    POC Glucose 10/06/2022 286 (A)  65 - 105 mg/dL Final    POC Glucose 10/06/2022 286 (A)  65 - 105 mg/dL Final    POC Glucose 10/06/2022 319 (A)  65 - 105 mg/dL Final    POC Glucose 10/07/2022 159 (A)  65 - 105 mg/dL Final    POC Glucose 10/07/2022 349 (A)  65 - 105 mg/dL Final    POC Glucose 10/07/2022 192 (A)  65 - 105 mg/dL Final    POC Glucose 10/07/2022 245 (A)  65 - 105 mg/dL Final POC Glucose 10/08/2022 233 (A)  65 - 105 mg/dL Final    POC Glucose 10/08/2022 192 (A)  65 - 105 mg/dL Final    POC Glucose 10/08/2022 219 (A)  65 - 105 mg/dL Final    POC Glucose 10/09/2022 138 (A)  65 - 105 mg/dL Final    POC Glucose 10/09/2022 252 (A)  65 - 105 mg/dL Final    POC Glucose 10/09/2022 197 (A)  65 - 105 mg/dL Final         Reviewed patient's current plan of care and vital signs with nursing staff.     Labs reviewed: [x] Yes      Medications  Current Facility-Administered Medications: insulin glargine (LANTUS) injection vial 38 Units, 38 Units, SubCUTAneous, BID  insulin lispro (HUMALOG) injection vial 0-8 Units, 0-8 Units, SubCUTAneous, TID WC  insulin lispro (HUMALOG) injection vial 0-4 Units, 0-4 Units, SubCUTAneous, Nightly  metFORMIN (GLUCOPHAGE) tablet 850 mg, 850 mg, Oral, BID WC  albuterol sulfate HFA (PROVENTIL;VENTOLIN;PROAIR) 108 (90 Base) MCG/ACT inhaler 2 puff, 2 puff, Inhalation, Q4H PRN  albuterol (PROVENTIL) nebulizer solution 2.5 mg, 2.5 mg, Nebulization, Q6H PRN  rivastigmine (EXELON) 4.6 MG/24HR 1 patch, 1 patch, TransDERmal, Daily  lidocaine 4 % external patch 1 patch, 1 patch, TransDERmal, Daily  atorvastatin (LIPITOR) tablet 40 mg, 40 mg, Oral, Nightly  glucagon (rDNA) injection 1 mg, 1 mg, SubCUTAneous, PRN  glucose chewable tablet 16 g, 4 tablet, Oral, PRN  OLANZapine zydis (ZYPREXA) disintegrating tablet 15 mg, 15 mg, Oral, Nightly  pantoprazole (PROTONIX) tablet 40 mg, 40 mg, Oral, BID  pregabalin (LYRICA) capsule 75 mg, 75 mg, Oral, BID  acetaminophen (TYLENOL) tablet 650 mg, 650 mg, Oral, Q6H PRN  ibuprofen (ADVIL;MOTRIN) tablet 400 mg, 400 mg, Oral, Q6H PRN  hydrOXYzine HCl (ATARAX) tablet 50 mg, 50 mg, Oral, TID PRN  traZODone (DESYREL) tablet 50 mg, 50 mg, Oral, Nightly PRN  polyethylene glycol (GLYCOLAX) packet 17 g, 17 g, Oral, Daily PRN  aluminum & magnesium hydroxide-simethicone (MAALOX) 200-200-20 MG/5ML suspension 30 mL, 30 mL, Oral, Q6H PRN  nicotine polacrilex (NICORETTE) gum 2 mg, 2 mg, Oral, Q2H PRN  haloperidol lactate (HALDOL) injection 5 mg, 5 mg, IntraMUSCular, Q6H PRN **AND** LORazepam (ATIVAN) injection 2 mg, 2 mg, IntraMUSCular, Q6H PRN **AND** diphenhydrAMINE (BENADRYL) injection 50 mg, 50 mg, IntraMUSCular, Q6H PRN    ASSESSMENT  Unspecified psychosis not due to a substance or known physiological condition [facility])            HANDOFF  Patient symptoms: Showing improvement  Consultation with attending physician for medication  Encourage participation in groups and milieu. Probable discharge is to be determined by MD    Electronically signed by LAILA Morris CNP on 10/9/2022 at 6:37 PM    **This report has been created using voice recognition software. It may contain minor errors which are inherent in voice recognition technology. **    I independently saw and evaluated the patient. I reviewed the nurse practioner's documentation above. Any additional comments or changes to the    documentation are stated below otherwise agree with assessment.      -The patient was seen face to face. She reports an improvement in her mood. She is compliant with medications. She is discharge focused. PLAN  Medications as noted above  Attempt to develop insight  Expected length of stay 1 to 2 days  Psycho-education conducted. Supportive Therapy conducted.   Follow-up daily while on inpatient unit    Electronically signed by Nela Lopez MD on 10/9/22 at 10:06 PM EDT

## 2022-10-09 NOTE — PLAN OF CARE
Pt has been social ,Pt denies all but had a out burst today at staff. Pt has also been Med complaint. Pt will continue to work on her goals. Problem: Chronic Conditions and Co-morbidities  Goal: Patient's chronic conditions and co-morbidity symptoms are monitored and maintained or improved  Outcome: Progressing     Problem: Anxiety  Goal: Will report anxiety at manageable levels  Description: INTERVENTIONS:  1. Administer medication as ordered  2. Teach and rehearse alternative coping skills  3. Provide emotional support with 1:1 interaction with staff  Outcome: Anat Rivera (Taken 10/9/2022 1011 by Charla Sheffield RN)  Will report anxiety at manageable levels:   Teach and rehearse alternative coping skills   Administer medication as ordered   Provide emotional support with 1:1 interaction with staff     Problem: Confusion  Goal: Confusion, delirium, dementia, or psychosis is improved or at baseline  Description: INTERVENTIONS:  1. Assess for possible contributors to thought disturbance, including medications, impaired vision or hearing, underlying metabolic abnormalities, dehydration, psychiatric diagnoses, and notify attending LIP  2. [place] high risk fall precautions, as indicated  3. Provide frequent short contacts to provide reality reorientation, refocusing and direction  4. Decrease environmental stimuli, including noise as appropriate  5. Monitor and intervene to maintain adequate nutrition, hydration, elimination, sleep and activity  6. If unable to ensure safety without constant attention obtain sitter and review sitter guidelines with assigned personnel  7.  Initiate Psychosocial CNS and Spiritual Care consult, as indicated  Outcome: Progressing

## 2022-10-09 NOTE — PLAN OF CARE
Problem: Confusion  Goal: Confusion, delirium, dementia, or psychosis is improved or at baseline  Description: INTERVENTIONS:  1. Assess for possible contributors to thought disturbance, including medications, impaired vision or hearing, underlying metabolic abnormalities, dehydration, psychiatric diagnoses, and notify attending LIP  2. [place] high risk fall precautions, as indicated  3. Provide frequent short contacts to provide reality reorientation, refocusing and direction  4. Decrease environmental stimuli, including noise as appropriate  5. Monitor and intervene to maintain adequate nutrition, hydration, elimination, sleep and activity  6. If unable to ensure safety without constant attention obtain sitter and review sitter guidelines with assigned personnel  7. Initiate Psychosocial CNS and Spiritual Care consult, as indicated  10/8/2022 [id number] by Lashaun Merino  Outcome: Progressing  Note: Patient denies hallucinations. Oriented x4. Out in the milieu, social with peers. Compliant with all prescribed medications for this shift. Pt denies thoughts of self harm and is agreeable to seeking out should thoughts of self harm arise. Safe environment maintained. Q15 minute checks for safety cont per unit policy. Will cont to monitor for safety and provides support and reassurance as needed. Problem: Safety - Adult  Goal: Free from fall injury  Outcome: Progressing  Note: Pt remains free of falls and verbalizes understanding of individual fall risks. Pt wearing non skid footwear and encouraged to seek out staff for any assistance needed.

## 2022-10-09 NOTE — BH NOTE
Community Meeting Group Note        Date: October 9, 2022 Start Time: 9am  End Time:  1405      Number of Participants in Group & Unit Census:  0/20      Topic: Goals group    Goal of Group: To identify a goal for the day. Comments:     Patient did not participate in [employer] group, despite staff encouragement and explanation of benefits. Patient remain seclusive to self. Q15 minute safety checks maintained for patient safety and will continue to encourage patient to attend unit programming.

## 2022-10-10 LAB
GLUCOSE BLD-MCNC: 143 MG/DL (ref 65–105)
GLUCOSE BLD-MCNC: 185 MG/DL (ref 65–105)
GLUCOSE BLD-MCNC: 193 MG/DL (ref 65–105)
GLUCOSE BLD-MCNC: 233 MG/DL (ref 65–105)

## 2022-10-10 PROCEDURE — 6370000000 HC RX 637 (ALT 250 FOR IP): Performed by: INTERNAL MEDICINE

## 2022-10-10 PROCEDURE — 99232 SBSQ HOSP IP/OBS MODERATE 35: CPT | Performed by: PSYCHIATRY & NEUROLOGY

## 2022-10-10 PROCEDURE — APPSS30 APP SPLIT SHARED TIME 16-30 MINUTES

## 2022-10-10 PROCEDURE — 99232 SBSQ HOSP IP/OBS MODERATE 35: CPT | Performed by: INTERNAL MEDICINE

## 2022-10-10 PROCEDURE — 1240000000 HC EMOTIONAL WELLNESS R&B

## 2022-10-10 PROCEDURE — 6370000000 HC RX 637 (ALT 250 FOR IP): Performed by: NURSE PRACTITIONER

## 2022-10-10 PROCEDURE — 82947 ASSAY GLUCOSE BLOOD QUANT: CPT

## 2022-10-10 PROCEDURE — 6370000000 HC RX 637 (ALT 250 FOR IP): Performed by: PSYCHIATRY & NEUROLOGY

## 2022-10-10 RX ADMIN — PANTOPRAZOLE SODIUM 40 MG: 40 TABLET, DELAYED RELEASE ORAL at 08:58

## 2022-10-10 RX ADMIN — ATORVASTATIN CALCIUM 40 MG: 20 TABLET, FILM COATED ORAL at 20:31

## 2022-10-10 RX ADMIN — PREGABALIN 75 MG: 75 CAPSULE ORAL at 08:58

## 2022-10-10 RX ADMIN — METFORMIN HYDROCHLORIDE 850 MG: 850 TABLET ORAL at 09:01

## 2022-10-10 RX ADMIN — ACETAMINOPHEN 650 MG: 325 TABLET, FILM COATED ORAL at 19:21

## 2022-10-10 RX ADMIN — TRAZODONE HYDROCHLORIDE 50 MG: 50 TABLET ORAL at 20:31

## 2022-10-10 RX ADMIN — PREGABALIN 75 MG: 75 CAPSULE ORAL at 20:31

## 2022-10-10 RX ADMIN — IBUPROFEN 400 MG: 400 TABLET ORAL at 20:31

## 2022-10-10 RX ADMIN — METFORMIN HYDROCHLORIDE 850 MG: 850 TABLET ORAL at 17:35

## 2022-10-10 RX ADMIN — PANTOPRAZOLE SODIUM 40 MG: 40 TABLET, DELAYED RELEASE ORAL at 20:31

## 2022-10-10 RX ADMIN — OLANZAPINE 15 MG: 10 TABLET, ORALLY DISINTEGRATING ORAL at 20:31

## 2022-10-10 RX ADMIN — INSULIN GLARGINE 38 UNITS: 100 INJECTION, SOLUTION SUBCUTANEOUS at 20:32

## 2022-10-10 RX ADMIN — INSULIN GLARGINE 38 UNITS: 100 INJECTION, SOLUTION SUBCUTANEOUS at 08:58

## 2022-10-10 ASSESSMENT — LIFESTYLE VARIABLES
HOW OFTEN DO YOU HAVE A DRINK CONTAINING ALCOHOL: NEVER
HOW MANY STANDARD DRINKS CONTAINING ALCOHOL DO YOU HAVE ON A TYPICAL DAY: PATIENT DOES NOT DRINK

## 2022-10-10 ASSESSMENT — PAIN DESCRIPTION - DESCRIPTORS: DESCRIPTORS: ACHING;SHARP

## 2022-10-10 ASSESSMENT — PAIN DESCRIPTION - ORIENTATION: ORIENTATION: LEFT;RIGHT

## 2022-10-10 ASSESSMENT — PAIN SCALES - GENERAL
PAINLEVEL_OUTOF10: 10
PAINLEVEL_OUTOF10: 8

## 2022-10-10 ASSESSMENT — PAIN - FUNCTIONAL ASSESSMENT: PAIN_FUNCTIONAL_ASSESSMENT: PREVENTS OR INTERFERES SOME ACTIVE ACTIVITIES AND ADLS

## 2022-10-10 ASSESSMENT — PAIN DESCRIPTION - LOCATION: LOCATION: GENERALIZED;LEG

## 2022-10-10 NOTE — PROGRESS NOTES
Daily Progress Note  10/10/2022    Patient Name: Wilmer Barber: Acute psychosis         SUBJECTIVE:    Patient was seen for follow-up assessment today. Patient has been compliant with scheduled medications at this time has not required emergency medications in the past 24 hours. When approached for interview patient was irritable with pressured rapid speech. Patient was focused on a nurse following her into the room and watching her use the restroom. Nursing staff reports that patient became angry during safety rounds and became fixated on a nurse responsible for removing food from her room. It is reported that patient has been writing grievances towards the staff member. Patient became agitated and dismissive during interview and reports that she does not know how to respond because she was preoccupied with anger. Appetite:  [x] Adequate/Unchanged  [] Increased  [] Decreased      Sleep:       [x] Adequate/Unchanged  [] Fair  [] Poor      Group Attendance on Unit:   [] Yes   [x] Selectively    [] No    Medication Side Effects: Denies          Mental Status Exam  Level of consciousness: Alert and awake   Appearance: Appropriate attire for setting, seated in wheelchair, with fair  grooming and hygiene   Behavior/Motor: Irritable, dismissive  Attitude toward examiner: [address] cooperation, poor attention, good eye contact  Speech: Spontaneous, pressured, rapid rate, normal to loud volume, irritable tone  Mood: \"Mad\"  Affect: Congruent  Thought processes: Linear, coherent, illogical  Thought content: Denies homicidal ideation  Suicidal Ideation: Denies  Delusions: No evidence of delusions. Perceptual Disturbance: Patient does not appear to be responding to internal stimuli; denies AVH  Cognition: Oriented to self, location, and disorganized to time and situation  Memory: impaired  Insight: poor   Judgement: poor     Data   height is 5' 5\" (1.651 m) and weight is 207 lb (93.9 kg).  Her oral temperature is 97.9 °F (36.6 °C). Her blood pressure is 112/61 and her pulse is 92. Her respiration is 14. Labs:   Admission on 09/22/2022   Component Date Value Ref Range Status    POC Glucose 09/22/2022 135 (A)  65 - 105 mg/dL Final    POC Glucose 09/23/2022 168 (A)  65 - 105 mg/dL Final    WBC 09/23/2022 5.3  3.5 - 11.0 k/uL Final    RBC 09/23/2022 2.72 (A)  4.0 - 5.2 m/uL Final    Hemoglobin 09/23/2022 8.6 (A)  12.0 - 16.0 g/dL Final    Hematocrit 09/23/2022 26.8 (A)  36 - 46 % Final    MCV 09/23/2022 98.8  80 - 100 fL Final    MCH 09/23/2022 31.8  26 - 34 pg Final    MCHC 09/23/2022 32.2  31 - 37 g/dL Final    RDW 09/23/2022 14.9  11.5 - 14.9 % Final    Platelets 56/49/7002 434  150 - 450 k/uL Final    MPV 09/23/2022 7.8  6.0 - 12.0 fL Final    Glucose 09/23/2022 315 (A)  70 - 99 mg/dL Final    BUN 09/23/2022 13  6 - 20 mg/dL Final    Creatinine 09/23/2022 0.78  0.50 - 0.90 mg/dL Final    Calcium 09/23/2022 8.7  8.6 - 10.4 mg/dL Final    Sodium 09/23/2022 135  135 - 144 mmol/L Final    Potassium 09/23/2022 4.8  3.7 - 5.3 mmol/L Final    Chloride 09/23/2022 101  98 - 107 mmol/L Final    CO2 09/23/2022 24  20 - 31 mmol/L Final    Anion Gap 09/23/2022 10  9 - 17 mmol/L Final    Alkaline Phosphatase 09/23/2022 243 (A)  35 - 104 U/L Final    ALT 09/23/2022 10  5 - 33 U/L Final    AST 09/23/2022 8  <32 U/L Final    Total Bilirubin 09/23/2022 0.2 (A)  0.3 - 1.2 mg/dL Final    Total Protein 09/23/2022 6.8  6.4 - 8.3 g/dL Final    Albumin 09/23/2022 2.7 (A)  3.5 - 5.2 g/dL Final    GFR Non- 09/23/2022 >60  >60 mL/min Final    GFR  09/23/2022 >60  >60 mL/min Final    GFR Comment 09/23/2022        Final    Comment: Average GFR for 52-63 years old:   80 mL/min/1.73sq m  Chronic Kidney Disease:   <60 mL/min/1.73sq m  Kidney failure:   <15 mL/min/1.73sq m              eGFR calculated using average adult body mass.  Additional eGFR calculator available at: [employer]            POC Glucose 09/23/2022 222 (A)  65 - 105 mg/dL Final    POC Glucose 09/23/2022 286 (A)  65 - 105 mg/dL Final    POC Glucose 09/23/2022 237 (A)  65 - 105 mg/dL Final    POC Glucose 09/24/2022 185 (A)  65 - 105 mg/dL Final    POC Glucose 09/24/2022 177 (A)  65 - 105 mg/dL Final    POC Glucose 09/24/2022 241 (A)  65 - 105 mg/dL Final    POC Glucose 09/24/2022 221 (A)  65 - 105 mg/dL Final    POC Glucose 09/24/2022 248 (A)  65 - 105 mg/dL Final    Hepatitis C Ab 09/26/2022 NONREACTIVE  NONREACTIVE Final    Comment:       The hepatitis C procedure used in our laboratory is a Chemiluminescent test specific for   three recombinant HCV antigens. A negative anti-HCV result indicates that the antibodies to   hepatitis C virus are not present at this time. Individuals with reactive anti-HCV should be considered infected and infectious until proven   otherwise. Confirmation of all equivocal or reactive results is recommended by ordering   HCV RNA by PCR. POC Glucose 09/25/2022 142 (A)  65 - 105 mg/dL Final    POC Glucose 09/25/2022 204 (A)  65 - 105 mg/dL Final    POC Glucose 09/25/2022 255 (A)  65 - 105 mg/dL Final    POC Glucose 09/25/2022 224 (A)  65 - 105 mg/dL Final    HIV Ag/Ab 09/26/2022 NONREACTIVE  NONREACTIVE Final    Comment: No laboratory evidence of HIV infection. If acute HIV infection is suspected, consider   testing for HIV-1 RNA.       POC Glucose 09/26/2022 140 (A)  65 - 105 mg/dL Final    POC Glucose 09/26/2022 248 (A)  65 - 105 mg/dL Final    POC Glucose 09/26/2022 279 (A)  65 - 105 mg/dL Final    POC Glucose 09/26/2022 331 (A)  65 - 105 mg/dL Final    POC Glucose 09/27/2022 196 (A)  65 - 105 mg/dL Final    POC Glucose 09/27/2022 227 (A)  65 - 105 mg/dL Final    POC Glucose 09/27/2022 311 (A)  65 - 105 mg/dL Final    POC Glucose 09/27/2022 295 (A)  65 - 105 mg/dL Final    POC Glucose 09/27/2022 301 (A)  65 - 105 mg/dL Final    POC Glucose 09/28/2022 267 (A)  65 - 105 mg/dL Final    POC Glucose 09/28/2022 334 (A)  65 - 105 mg/dL Final    POC Glucose 09/28/2022 270 (A)  65 - 105 mg/dL Final    POC Glucose 09/28/2022 248 (A)  65 - 105 mg/dL Final    POC Glucose 09/28/2022 251 (A)  65 - 105 mg/dL Final    POC Glucose 09/28/2022 235 (A)  65 - 105 mg/dL Final    Critical Noted    POC Glucose 09/28/2022 323 (A)  65 - 105 mg/dL Final    POC Glucose 09/28/2022 475 (A)  65 - 105 mg/dL Final    Critical Noted    POC Glucose 09/28/2022 452 (A)  65 - 105 mg/dL Final    Critical Noted    POC Glucose 09/28/2022 423 (A)  65 - 105 mg/dL Final    Critical Noted    Glucose 09/29/2022 299 (A)  70 - 99 mg/dL Final    BUN 09/29/2022 14  6 - 20 mg/dL Final    Creatinine 09/29/2022 1.05 (A)  0.50 - 0.90 mg/dL Final    Calcium 09/29/2022 8.4 (A)  8.6 - 10.4 mg/dL Final    Sodium 09/29/2022 138  135 - 144 mmol/L Final    Potassium 09/29/2022 5.0  3.7 - 5.3 mmol/L Final    Chloride 09/29/2022 103  98 - 107 mmol/L Final    CO2 09/29/2022 26  20 - 31 mmol/L Final    Anion Gap 09/29/2022 9  9 - 17 mmol/L Final    GFR Non- 09/29/2022 54 (A)  >60 mL/min Final    GFR  09/29/2022 >60  >60 mL/min Final    GFR Comment 09/29/2022        Final    Comment: Average GFR for 52-63 years old:   80 mL/min/1.73sq m  Chronic Kidney Disease:   <60 mL/min/1.73sq m  Kidney failure:   <15 mL/min/1.73sq m              eGFR calculated using average adult body mass.  Additional eGFR calculator available at:        [employer]            POC Glucose 09/29/2022 153 (A)  65 - 105 mg/dL Final    POC Glucose 09/29/2022 244 (A)  65 - 105 mg/dL Final    POC Glucose 09/29/2022 172 (A)  65 - 105 mg/dL Final    POC Glucose 09/30/2022 162 (A)  65 - 105 mg/dL Final    POC Glucose 09/29/2022 250 (A)  65 - 105 mg/dL Final    POC Glucose 09/30/2022 262 (A)  65 - 105 mg/dL Final    POC Glucose 09/30/2022 199 (A)  65 - 105 mg/dL Final    POC Glucose 09/30/2022 239 (A)  65 - 105 mg/dL Final    POC Glucose 10/01/2022 122 (A)  65 - 105 mg/dL Final    POC Glucose 10/01/2022 179 (A)  65 - 105 mg/dL Final    POC Glucose 10/01/2022 167 (A)  65 - 105 mg/dL Final    POC Glucose 10/01/2022 185 (A)  65 - 105 mg/dL Final    POC Glucose 10/02/2022 166 (A)  65 - 105 mg/dL Final    POC Glucose 10/02/2022 271 (A)  65 - 105 mg/dL Final    POC Glucose 10/02/2022 324 (A)  65 - 105 mg/dL Final    POC Glucose 10/02/2022 315 (A)  65 - 105 mg/dL Final    POC Glucose 10/03/2022 232 (A)  65 - 105 mg/dL Final    POC Glucose 10/03/2022 407 (A)  65 - 105 mg/dL Final    Critical Noted    POC Glucose 10/03/2022 371 (A)  65 - 105 mg/dL Final    POC Glucose 10/03/2022 410 (A)  65 - 105 mg/dL Final    Critical Noted    POC Glucose 10/04/2022 212 (A)  65 - 105 mg/dL Final    Glucose 10/04/2022 505 (A)  70 - 99 mg/dL Final    BUN 10/04/2022 13  6 - 20 mg/dL Final    Creatinine 10/04/2022 1.14 (A)  0.50 - 0.90 mg/dL Final    Calcium 10/04/2022 8.8  8.6 - 10.4 mg/dL Final    Sodium 10/04/2022 133 (A)  135 - 144 mmol/L Final    Potassium 10/04/2022 5.6 (A)  3.7 - 5.3 mmol/L Final    Chloride 10/04/2022 98  98 - 107 mmol/L Final    CO2 10/04/2022 26  20 - 31 mmol/L Final    Anion Gap 10/04/2022 9  9 - 17 mmol/L Final    Est, Glom Filt Rate 10/04/2022 57 (A)  >60 mL/min/1.73m2 Final    Comment:       Effective Oct 3, 2022        These results are not intended for use in patients <25years of age. eGFR results are calculated without a race factor using the 2021 CKD-EPI equation. Careful clinical correlation is recommended, particularly when comparing to results   calculated using previous equations. The CKD-EPI equation is less accurate in patients with extremes of muscle mass, extra-renal   metabolism of creatine, excessive creatine ingestion, or following therapy that affects   renal tubular secretion.       POC Glucose 10/04/2022 388 (A)  65 - 105 mg/dL Final    POC Glucose 10/04/2022 332 (A)  65 - 105 mg/dL Final    POC Glucose 10/04/2022 337 (A)  65 - 105 mg/dL Final    POC Glucose 10/05/2022 249 (A)  65 - 105 mg/dL Final    POC Glucose 10/05/2022 384 (A)  65 - 105 mg/dL Final    POC Glucose 10/05/2022 223 (A)  65 - 105 mg/dL Final    Glucose 10/06/2022 227 (A)  70 - 99 mg/dL Final    BUN 10/06/2022 16  6 - 20 mg/dL Final    Creatinine 10/06/2022 0.89  0.50 - 0.90 mg/dL Final    Est, Glom Filt Rate 10/06/2022 >60  >60 mL/min/1.73m2 Final    Comment:       Effective Oct 3, 2022        These results are not intended for use in patients <25years of age. eGFR results are calculated without a race factor using the 2021 CKD-EPI equation. Careful clinical correlation is recommended, particularly when comparing to results   calculated using previous equations. The CKD-EPI equation is less accurate in patients with extremes of muscle mass, extra-renal   metabolism of creatine, excessive creatine ingestion, or following therapy that affects   renal tubular secretion. Calcium 10/06/2022 8.7  8.6 - 10.4 mg/dL Final    Sodium 10/06/2022 139  135 - 144 mmol/L Final    Potassium 10/06/2022 4.6  3.7 - 5.3 mmol/L Final    Chloride 10/06/2022 105  98 - 107 mmol/L Final    CO2 10/06/2022 26  20 - 31 mmol/L Final    Anion Gap 10/06/2022 8 (A)  9 - 17 mmol/L Final    Hemoglobin A1C 10/06/2022 8.0 (A)  4.0 - 6.0 % Final    Estimated Avg Glucose 10/06/2022 183  mg/dL Final    Comment: The ADA and AACC recommend providing the estimated average glucose result to permit better   patient understanding of their HBA1c result.       POC Glucose 10/05/2022 304 (A)  65 - 105 mg/dL Final    POC Glucose 10/06/2022 209 (A)  65 - 105 mg/dL Final    POC Glucose 10/06/2022 286 (A)  65 - 105 mg/dL Final    POC Glucose 10/06/2022 286 (A)  65 - 105 mg/dL Final    POC Glucose 10/06/2022 319 (A)  65 - 105 mg/dL Final    POC Glucose 10/07/2022 159 (A)  65 - 105 mg/dL Final    POC Glucose 10/07/2022 349 (A)  65 - 105 mg/dL Final POC Glucose 10/07/2022 192 (A)  65 - 105 mg/dL Final    POC Glucose 10/07/2022 245 (A)  65 - 105 mg/dL Final    POC Glucose 10/08/2022 233 (A)  65 - 105 mg/dL Final    POC Glucose 10/08/2022 192 (A)  65 - 105 mg/dL Final    POC Glucose 10/08/2022 219 (A)  65 - 105 mg/dL Final    POC Glucose 10/09/2022 138 (A)  65 - 105 mg/dL Final    POC Glucose 10/09/2022 252 (A)  65 - 105 mg/dL Final    POC Glucose 10/09/2022 197 (A)  65 - 105 mg/dL Final    POC Glucose 10/09/2022 242 (A)  65 - 105 mg/dL Final    POC Glucose 10/10/2022 143 (A)  65 - 105 mg/dL Final    POC Glucose 10/10/2022 193 (A)  65 - 105 mg/dL Final         Reviewed patient's current plan of care and vital signs with nursing staff.     Labs reviewed: [x] Yes      Medications  Current Facility-Administered Medications: insulin glargine (LANTUS) injection vial 38 Units, 38 Units, SubCUTAneous, BID  insulin lispro (HUMALOG) injection vial 0-8 Units, 0-8 Units, SubCUTAneous, TID WC  insulin lispro (HUMALOG) injection vial 0-4 Units, 0-4 Units, SubCUTAneous, Nightly  metFORMIN (GLUCOPHAGE) tablet 850 mg, 850 mg, Oral, BID WC  albuterol sulfate HFA (PROVENTIL;VENTOLIN;PROAIR) 108 (90 Base) MCG/ACT inhaler 2 puff, 2 puff, Inhalation, Q4H PRN  albuterol (PROVENTIL) nebulizer solution 2.5 mg, 2.5 mg, Nebulization, Q6H PRN  rivastigmine (EXELON) 4.6 MG/24HR 1 patch, 1 patch, TransDERmal, Daily  lidocaine 4 % external patch 1 patch, 1 patch, TransDERmal, Daily  atorvastatin (LIPITOR) tablet 40 mg, 40 mg, Oral, Nightly  glucagon (rDNA) injection 1 mg, 1 mg, SubCUTAneous, PRN  glucose chewable tablet 16 g, 4 tablet, Oral, PRN  OLANZapine zydis (ZYPREXA) disintegrating tablet 15 mg, 15 mg, Oral, Nightly  pantoprazole (PROTONIX) tablet 40 mg, 40 mg, Oral, BID  pregabalin (LYRICA) capsule 75 mg, 75 mg, Oral, BID  acetaminophen (TYLENOL) tablet 650 mg, 650 mg, Oral, Q6H PRN  ibuprofen (ADVIL;MOTRIN) tablet 400 mg, 400 mg, Oral, Q6H PRN  hydrOXYzine HCl (ATARAX) tablet 50 mg, 50 mg, Oral, TID PRN  traZODone (DESYREL) tablet 50 mg, 50 mg, Oral, Nightly PRN  polyethylene glycol (GLYCOLAX) packet 17 g, 17 g, Oral, Daily PRN  aluminum & magnesium hydroxide-simethicone (MAALOX) 200-200-20 MG/5ML suspension 30 mL, 30 mL, Oral, Q6H PRN  nicotine polacrilex (NICORETTE) gum 2 mg, 2 mg, Oral, Q2H PRN  haloperidol lactate (HALDOL) injection 5 mg, 5 mg, IntraMUSCular, Q6H PRN **AND** LORazepam (ATIVAN) injection 2 mg, 2 mg, IntraMUSCular, Q6H PRN **AND** diphenhydrAMINE (BENADRYL) injection 50 mg, 50 mg, IntraMUSCular, Q6H PRN    ASSESSMENT  Unspecified psychosis not due to a substance or known physiological condition [facility])          HANDOFF  Patient symptoms: Remains the same  Consultation with attending physician for medication  Encourage participation in groups and milieu. Probable discharge is to be determined by MD    Electronically signed by LAILA Niño CNP on 10/10/2022 at 2:48 PM    **This report has been created using voice recognition software. It may contain minor errors which are inherent in voice recognition technology. **    II independently saw and evaluated the patient. I reviewed the nurse practioner's documentation above. Any additional comments or changes to the    documentation are stated below otherwise agree with assessment. The patient was seen face to face. She is upset. She states that she had an argument with the nurse on call last night. She said the nurse would not leave her room even though she asked him to. The patient has been going to groups. She is a little irritable. The patient has been taking her medications as prescribed. The patient has been accepted to the [facility]. We are awaiting confirmation and elevation of the bed before transfer        PLAN  Medications as noted above  Attempt to develop insight  Expected Length of Stay is 1-3 days. Psycho-education conducted. Supportive Therapy conducted.   Follow-up daily while on inpatient unit     Electronically signed by Steven Michel MD on 10/10/22 at 12:29 PM EDT

## 2022-10-10 NOTE — PLAN OF CARE
Problem: Chronic Conditions and Co-morbidities  Goal: Patient's chronic conditions and co-morbidity symptoms are monitored and maintained or improved  Outcome: Progressing, patient educated of sign/symptoms of Hyper/Hypo glycemic. Patient medication complaint. Problem: Anxiety  Goal: Will report anxiety at manageable levels  Description: INTERVENTIONS:  1. Administer medication as ordered  2. Teach and rehearse alternative coping skills  3. Provide emotional support with 1:1 interaction with staff  10/10/2022 0959 by Fabrizio Grimaldo LPN  Outcome: Progressing, Patient tearful this morning due to not remembering certain things, patient encouraged to seek staff for 1:1 Talk time and continue her  medication regimen. Problem: Safety - Adult  Goal: Free from fall injury  Outcome: Progressing, Patient able to ambulate,however due to lower extremities weakness patient utilize a wheel chair. Non skid slipper on. Problem: Self Harm/Suicidality  Goal: Will have no self-injury during hospital stay  Description: INTERVENTIONS:  1. Q 30 MINUTES: Routine safety checks  2. Q SHIFT & PRN: Assess risk to determine if routine checks are adequate to maintain patient safety  10/10/2022 0959 by Fabrizio Grimaldo LPN  Outcome: Progressing,  Patient  denies though of self harm. 15 MIN safety checks.

## 2022-10-10 NOTE — PROGRESS NOTES
[place] [address] [facility]    Progress note             Date:   10/10/2022  Patient name:  Jamar Monroy  Date of admission:  9/22/2022  3:40 PM  MRN:   [id number]  Account:  [de-identified]  YOB: 1965  PCP:    Brigette Shore MD  Room:   [address] [place] [address]  Code Status:    Full Code    Physician Requesting Consult: Gianna Koenig MD    Reason for Consult: History and physical, medical management    Chief Complaint:     No chief complaint on file. Medical management    History Obtained From:     Patient, EMR, nursing staff    History of Present Illness:     32-year-old female admitted for acute psychotic state    She was initially admitted to medical unit after being found unresponsive at her home, treated for DKA with IV insulin, acute renal failure with substantial hydration, and hypovolemic shock requiring Levophed, severe hypernatremia >170, concern for GI bleed status post EGD which showed multiple duodenal ulcers treated with PPI.   Patient remained confused and delusional, CT head negative, MRI could not be done due to lack of patient cooperation      Medical history includes asthma, hypertension, stroke, type 2 diabetes, polysubstance abuse, recent ischemic stroke  Past Medical History:     Past Medical History:   Diagnosis Date    Acid reflux 5/29/2017    Acute cystitis with hematuria 10/11/2016    Acute non-recurrent maxillary sinusitis 11/28/2017    Asthma     Bipolar 1 disorder ([address] 1/4/2018    Bipolar disorder, mixed ([address]     BMI 34.0-34.9,adult 11/28/2017    Cannabis use disorder, severe, dependence ([address] 12/19/2017    Cerebrovascular accident (CVA) ([address] 6/14/2017    Chest pain 11/5/2016    Chronic renal insufficiency     Cocaine abuse ([address] 1/5/2018    COVID-19 virus RNA test result unknown     DDD (degenerative disc disease), cervical     Diabetes mellitus ([address]     Dizziness 6/13/2017    Fibromyalgia     History of stroke 9/9/2017    Homicidal ideation 11/6/2017    Hyperglycemia     Hypertension     Hypotension 1/18/2019    IDDM (insulin dependent diabetes mellitus) 12/21/2015    Lupus [facility]     Migraine     Neuropathy     Neuropathy     Polysubstance abuse ([address] 9/17/2017    Post traumatic stress disorder (PTSD)     Posttraumatic stress disorder 5/29/2017    Recurrent depression ([address] 5/29/2017    Recurrent major depressive disorder, in partial remission ([address] 11/28/2017    Screening mammogram, encounter for 11/28/2017    Severe recurrent major depression with psychotic features ([address] 12/19/2017    Severe recurrent major depression without psychotic features ([address] 12/19/2017    Stroke (cerebrum) ([address] 6/14/2017    Stroke ([facility]     per chart notes    Suicidal ideation     Suicidal intent 3/10/2017    Vitamin D deficiency 11/28/2017    White matter changes 6/13/2017        Past Surgical History:     Past Surgical History:   Procedure Laterality Date    ABDOMEN SURGERY      drain tube    ABSCESS DRAINAGE      right buttock    CATARACT REMOVAL WITH IMPLANT Bilateral     CHEST TUBE INSERTION      FINGER AMPUTATION Left 03/13/2021    LEFT RING FINER AMPUTATION performed by José Arevalo MD at [address] [facility] Right 10/11/2021    AMPUTATION RIGHT INDEX FINGER performed by José Arevalo MD at [address] [place] [address] Right 1/27/2022    FOOT ABSCESS INCISION AND DRAINAGE  (PULSE LAVAGE, CULTURE SWABS) performed by Chip Mckeon DPM at [facility] Right 01/27/2022    FOOT ABSCESS INCISION AND DRAINAGE (PULSE LAVAGE, CULTURE SWABS) - Right    HAND SURGERY Right 09/14/2021    I&D INDEX FINGER performed by José Arevalo MD at [facility] Right 09/15/2021    I&D INDEX FINGER performed by José Arevalo MD at [address]  2/3/2022         LASIK Bilateral     UPPER GASTROINTESTINAL ENDOSCOPY N/A 9/16/2022    EGD BIOPSY performed by Maribeth Morgan MD at [facility] Prior to Admission:     Prior to Admission medications    Medication Sig Start Date End Date Taking? Authorizing Provider   atorvastatin (LIPITOR) 40 MG tablet Take 1 tablet by mouth nightly 9/27/22 12/26/22 Yes Eddye Alpers, MD   blood glucose monitor strips Test 3 times a day & as needed for symptoms of irregular blood glucose. Dispense sufficient amount for indicated testing frequency plus additional to accommodate PRN testing needs. 9/27/22  Yes Eddye Alpers, MD   metFORMIN (GLUCOPHAGE) 1000 MG tablet Take 1 tablet by mouth 2 times daily (with meals) 9/27/22  Yes Eddye Alpers, MD   traZODone (DESYREL) 50 MG tablet Take 1 tablet by mouth nightly as needed for Sleep 9/27/22  Yes Eddye Alpers, MD   pantoprazole (PROTONIX) 40 MG tablet Take 1 tablet by mouth 2 times daily 9/27/22  Yes Eddye Alpers, MD   OLANZapine (ZYPREXA) 10 MG tablet Take 1.5 tablets by mouth nightly 9/27/22  Yes Eddye Alpers, MD   insulin glargine (LANTUS SOLOSTAR) 100 UNIT/ML injection pen Inject 30 Units into the skin 2 times daily 4/22/22   Lisa Castorena MD   fluticasone (FLONASE) 50 MCG/ACT nasal spray 1 spray by Each Nostril route daily 3/15/22   Lisa Castorena MD   Alcohol Swabs 70 % PADS Use 1 swab 3 times daily as needed 3/15/22   Lisa Castorena MD   Lancets MISC 1 each by Does not apply route 3 times daily 3/15/22   Lisa Castorena MD   Insulin Pen Needle ([employer] PEN NEEDLES 31G) 31G X 8 MM MISC 1 each by Does not apply route daily 3/15/22   Lisa Castorena MD   budesonide-formoterol (SYMBICORT) 80-4.5 MCG/ACT AERO Inhale 2 puffs into the lungs 2 times daily 3/15/22   Lisa Castorena MD   albuterol sulfate  (90 Base) MCG/ACT inhaler Inhale 2 puffs into the lungs every 4 hours as needed for Wheezing 3/15/22 4/15/22  MD Flaco Edwards.  Devices MISC 1 PAIR OF DIABETIC SHOES (1 LEFT/ 1 RIGHT)  1-3 PAIRS OF INSERTS (LEFT/ RIGHT) 2/15/22   Jessa Hamm DPM        Allergies:     Bactrim [sulfamethoxazole-trimethoprim] and Adhesive tape    Social History:     Tobacco:    reports that she has never smoked. She has never used smokeless tobacco.  Alcohol:      reports that she does not currently use alcohol. Drug Use:  reports current drug use. Drugs: Marijuana (Weed) and Cocaine. Family History:     Family History   Problem Relation Age of Onset    Diabetes Mother     Stroke Mother     Diabetes Father     Diabetes Sister     Diabetes Brother     Other Son         GSW    No Known Problems Sister        Review of Systems:     Positive and Negative as described in HPI. Denies any shortness of breath or cough  Denies chest pain or palpitations  Denies diarrhea vomiting. Reports mild epigastric pain  Denies any new numbness tremors or weakness. 10 point review of systems was negative other than mentioned above    Physical Exam:     /61   Pulse 92   Temp 97.9 °F (36.6 °C) (Oral)   Resp 14   Ht 5' 5\" (1.651 m)   Wt 207 lb (93.9 kg)   LMP  (LMP Unknown)   PF (!) 14 L/min   BMI 34.45 kg/m²   Temp (24hrs), Av.7 °F (36.5 °C), Min:97.5 °F (36.4 °C), Max:97.9 °F (36.6 °C)    Recent Labs     10/09/22  1124 10/09/22  1634 10/09/22  1951 10/10/22  0820   POCGLU 252* 197* 242* 143*       No intake or output data in the 24 hours ending 10/10/22 1105    General Appearance:  alert, well appearing, and in no acute distress  Head:  normocephalic, atraumatic. Eye: no icterus, redness, pupils equal and reactive, extraocular eye movements intact, conjunctiva clear  Ear: normal external ear, no discharge, hearing intact  Nose:  no drainage noted  Mouth: mucous membranes moist  Neck: supple, no carotid bruits, thyroid not palpable  Lungs: Bilateral equal air entry, clear to ausculation, no wheezing, rales or rhonchi, normal effort  Cardiovascular: normal rate, regular rhythm, no murmur, gallop, rub.   Abdomen: Soft, nontender, nondistended, normal bowel sounds, no hepatomegaly or splenomegaly  Neurologic: difficult to assess, limited pt co-operation. Alert, oriented X 1-2, mobile, power equal all 4 limibs. Normal speech but abnormal content, confabulation present. No focal neurology otherwise  Skin: No gross lesions, rashes, bruising or bleeding on exposed skin area  Extremities:  No joint swelling, no pedal edema or calf pain with palpation      Investigations:      Laboratory Testing:  Recent Results (from the past 24 hour(s))   POC Glucose Fingerstick    Collection Time: 10/09/22 11:24 AM   Result Value Ref Range    POC Glucose 252 (H) 65 - 105 mg/dL   POC Glucose Fingerstick    Collection Time: 10/09/22  4:34 PM   Result Value Ref Range    POC Glucose 197 (H) 65 - 105 mg/dL   POC Glucose Fingerstick    Collection Time: 10/09/22  7:51 PM   Result Value Ref Range    POC Glucose 242 (H) 65 - 105 mg/dL   POC Glucose Fingerstick    Collection Time: 10/10/22  8:20 AM   Result Value Ref Range    POC Glucose 143 (H) 65 - 105 mg/dL       Imaging/Diagonstics:  Recent data reviewed    Assessment :      Primary Problem  Unspecified psychosis not due to a substance or known physiological condition [facility])    Active Hospital Problems    Diagnosis Date Noted    Unsp psychosis not due to a substance or known physiol cond ([address] [F29] 09/28/2022     Priority: Medium    Fentanyl use disorder, mild ([address] [F11.10] 09/23/2022     Priority: Medium    Unspecified psychosis not due to a substance or known physiological condition ([address] [F29] 09/22/2022     Priority: Medium    Cocaine use disorder ([facility] [F14.10] 01/05/2018       Plan:     Medications: Allergies:     Allergies   Allergen Reactions    Bactrim [Sulfamethoxazole-Trimethoprim] Swelling    Adhesive Tape Rash       Current Meds:   Scheduled Meds:    insulin glargine  38 Units SubCUTAneous BID    insulin lispro  0-8 Units SubCUTAneous TID WC    insulin lispro  0-4 Units SubCUTAneous Nightly    metFORMIN  850 mg Oral BID WC    rivastigmine  1 patch TransDERmal Daily    lidocaine  1 patch TransDERmal Daily    atorvastatin  40 mg Oral Nightly    OLANZapine zydis  15 mg Oral Nightly    pantoprazole  40 mg Oral BID    pregabalin  75 mg Oral BID     Continuous Infusions:   PRN Meds: albuterol sulfate HFA, albuterol, glucagon (rDNA), glucose, acetaminophen, ibuprofen, hydrOXYzine HCl, traZODone, polyethylene glycol, aluminum & magnesium hydroxide-simethicone, nicotine polacrilex, haloperidol lactate **AND** LORazepam **AND** diphenhydrAMINE    Reason for consult: General medical management     Acute psychosis - management per psych-  DKA- resolved  Type 2 DM- BG lantus, SSI- BG controlled, resume statin, metformin  Acute renal failure- creat normalized  Acute toxic encephalopathy- still encephalopathic  Hypertension- currently off meds- BP controlled  Upper GI bleed, duodenal ulcers-  c/w PPI  Recent stroke- off ASA due to recent GI bleed- Hb 7.6 last, will repeat    Repeat labs ordered, including HIV and hep C per patient request    DVT prophylaxis-not required, patient is mobile      10/6/22    Blood sugars improved but still suboptimal    Last hba1c 11.4  Lantus increased to 34 units   Metformin 850 bid   On high dose sliding scale . Poor control The current medical regimen is effective;  continue present plan and medications. To titrate           10/7/22    Poor control dm  Increase lantuds to 38 bid  Reduce dlifimg scale to medium    [place] to monitor              10/10/22  Recent Labs     10/09/22  0819 10/09/22  1124 10/09/22  1634 10/09/22  1951 10/10/22  0820   POCGLU 138* 252* 197* 242* 143*       Blood sugar control improved   Continue current dose              Consultations:   Martín Izaguirre MD  10/10/2022  11:05 AM    Copy sent to En Barillas MD    Please note that this chart was generated using voice recognition Dragon dictation software.   Although every effort was made to ensure the accuracy of this automated transcription, some errors in transcription may have occurred.

## 2022-10-10 NOTE — GROUP NOTE
Group Therapy Note    Date: 10/10/2022    Group Start Time: 1430  Group End Time: 1520  Group Topic: Cognitive Skills    [facility]    CHRISTINA Mensah; Sonali Christianson, [address]    Cognitive Skills Group Note        Date: October 10, 2022 Start Time: 2:30pm  End Time: 3:20pm      Number of Participants in Group & Unit Census:  6/18    Topic: Interpersonal skills, communication skills & reminiscing     Goal of Group: To improve interpersonal skills, communication skills and promote reminiscing with peers. Comments:     Patient did not participate in Cognitive Skills group, despite staff encouragement and explanation of benefits. Patient remain seclusive to self. Q15 minute safety checks maintained for patient safety and will continue to encourage patient to attend unit programming.           Signature:  CHRISTINA Mensah

## 2022-10-10 NOTE — PROGRESS NOTES
I independently saw and evaluated the patient. I reviewed the nurse practioner's documentation above. Any additional comments or changes to the    documentation are stated below otherwise agree with assessment. The patient was seen face to face. She is upset. She states that she had an argument with the nurse on call last night. She said the nurse would not leave her room even though she asked him to. The patient has been going to groups. She is a little irritable. The patient has been taking her medications as prescribed. The patient has been accepted to the [facility]. We are awaiting confirmation and elevation of the bed before transfer      PLAN  Medications as noted above  Attempt to develop insight  Expected Length of Stay is 1-3 days. Psycho-education conducted. Supportive Therapy conducted.   Follow-up daily while on inpatient unit    Electronically signed by Beryl Camargo MD on 10/10/22 at 12:29 PM EDT

## 2022-10-10 NOTE — GROUP NOTE
Group Therapy Note    Date: 10/10/2022    Group Start Time: 1100  Group End Time: 0955  Group Topic: Cognitive Skills    [facility]    CHRISTINA Salguero        Group Therapy Note    Attendees: 10/20        Patient's Goal:  To increase social interaction and to practice problem solving, and communication skills. Notes: Pt attended and participated fully in problem solving, pt did participate in communication of descriptions in group task but had difficulty after 2 successful attempts d/t thought blocking. Pt remembers song lyrics appropriate and on topic at intervals during group. Pt was able to practice problem solving, and communication skills. Status After Intervention: Improved     Participation Level:  Active Listener,  sharing ,supportive     Participation Quality:  Attentive, sharing , supportive     Speech:  Normal     Thought Process/Content: Logical, thought blocking     Affective Functioning: Congruent, brightens     Mood: Euthymic     Level of consciousness:  Alert, and Attentive        Response to Learning:  Able to verbalize current knowledge , Able to acknowledge new learning, and Progressing to goal        Endings: None Reported     Modes of Intervention: Education, Support, Socialization, Exploration, Clarifying and Problem-solving        Discipline Responsible: Psychoeducational Specialist        Signature:  CHRISTINA Vaughn

## 2022-10-10 NOTE — PLAN OF CARE
Problem: Anxiety  Goal: Will report anxiety at manageable levels  Description: INTERVENTIONS:  1. Administer medication as ordered  2. Teach and rehearse alternative coping skills  3. Provide emotional support with 1:1 interaction with staff  Outcome: Progressing     Problem: Self Harm/Suicidality  Goal: Will have no self-injury during hospital stay  Description: INTERVENTIONS:  1. Q 30 MINUTES: Routine safety checks  2. Q SHIFT & PRN: Assess risk to determine if routine checks are adequate to maintain patient safety  Outcome: Progressing     Pt is irritable during the shift but cooperative. Pt denies any depression, anxiety, suicidal ideations, or hallucinations. Pt reports that she is feeling better and would feel safe retuning home if she were discharged.

## 2022-10-11 PROBLEM — E78.49 OTHER HYPERLIPIDEMIA: Status: ACTIVE | Noted: 2022-10-11

## 2022-10-11 LAB
GLUCOSE BLD-MCNC: 147 MG/DL (ref 65–105)
GLUCOSE BLD-MCNC: 164 MG/DL (ref 65–105)
GLUCOSE BLD-MCNC: 238 MG/DL (ref 65–105)
GLUCOSE BLD-MCNC: 87 MG/DL (ref 65–105)

## 2022-10-11 PROCEDURE — 82947 ASSAY GLUCOSE BLOOD QUANT: CPT

## 2022-10-11 PROCEDURE — 6370000000 HC RX 637 (ALT 250 FOR IP): Performed by: INTERNAL MEDICINE

## 2022-10-11 PROCEDURE — 99232 SBSQ HOSP IP/OBS MODERATE 35: CPT | Performed by: INTERNAL MEDICINE

## 2022-10-11 PROCEDURE — APPSS30 APP SPLIT SHARED TIME 16-30 MINUTES: Performed by: PSYCHIATRY & NEUROLOGY

## 2022-10-11 PROCEDURE — 1240000000 HC EMOTIONAL WELLNESS R&B

## 2022-10-11 PROCEDURE — 6370000000 HC RX 637 (ALT 250 FOR IP): Performed by: NURSE PRACTITIONER

## 2022-10-11 PROCEDURE — 6370000000 HC RX 637 (ALT 250 FOR IP): Performed by: PSYCHIATRY & NEUROLOGY

## 2022-10-11 PROCEDURE — 99232 SBSQ HOSP IP/OBS MODERATE 35: CPT | Performed by: PSYCHIATRY & NEUROLOGY

## 2022-10-11 RX ADMIN — INSULIN GLARGINE 38 UNITS: 100 INJECTION, SOLUTION SUBCUTANEOUS at 09:15

## 2022-10-11 RX ADMIN — PREGABALIN 75 MG: 75 CAPSULE ORAL at 20:25

## 2022-10-11 RX ADMIN — HYDROXYZINE HYDROCHLORIDE 50 MG: 50 TABLET ORAL at 20:25

## 2022-10-11 RX ADMIN — TRAZODONE HYDROCHLORIDE 50 MG: 50 TABLET ORAL at 20:25

## 2022-10-11 RX ADMIN — OLANZAPINE 15 MG: 10 TABLET, ORALLY DISINTEGRATING ORAL at 20:25

## 2022-10-11 RX ADMIN — ACETAMINOPHEN 650 MG: 325 TABLET, FILM COATED ORAL at 12:40

## 2022-10-11 RX ADMIN — INSULIN GLARGINE 38 UNITS: 100 INJECTION, SOLUTION SUBCUTANEOUS at 20:24

## 2022-10-11 RX ADMIN — ACETAMINOPHEN 650 MG: 325 TABLET, FILM COATED ORAL at 20:25

## 2022-10-11 RX ADMIN — PANTOPRAZOLE SODIUM 40 MG: 40 TABLET, DELAYED RELEASE ORAL at 20:25

## 2022-10-11 RX ADMIN — METFORMIN HYDROCHLORIDE 850 MG: 850 TABLET ORAL at 17:36

## 2022-10-11 RX ADMIN — ACETAMINOPHEN 650 MG: 325 TABLET, FILM COATED ORAL at 01:49

## 2022-10-11 RX ADMIN — IBUPROFEN 400 MG: 400 TABLET ORAL at 21:11

## 2022-10-11 RX ADMIN — PANTOPRAZOLE SODIUM 40 MG: 40 TABLET, DELAYED RELEASE ORAL at 09:12

## 2022-10-11 RX ADMIN — PREGABALIN 75 MG: 75 CAPSULE ORAL at 09:12

## 2022-10-11 RX ADMIN — METFORMIN HYDROCHLORIDE 850 MG: 850 TABLET ORAL at 09:12

## 2022-10-11 RX ADMIN — ATORVASTATIN CALCIUM 40 MG: 20 TABLET, FILM COATED ORAL at 20:25

## 2022-10-11 ASSESSMENT — PAIN SCALES - GENERAL
PAINLEVEL_OUTOF10: 10
PAINLEVEL_OUTOF10: 3
PAINLEVEL_OUTOF10: 8
PAINLEVEL_OUTOF10: 4

## 2022-10-11 ASSESSMENT — PAIN DESCRIPTION - LOCATION
LOCATION: GENERALIZED;LEG
LOCATION: GENERALIZED;LEG
LOCATION: HEAD

## 2022-10-11 ASSESSMENT — PAIN DESCRIPTION - DESCRIPTORS
DESCRIPTORS: ACHING;SHARP
DESCRIPTORS: ACHING
DESCRIPTORS: ACHING;SHARP

## 2022-10-11 ASSESSMENT — PAIN - FUNCTIONAL ASSESSMENT
PAIN_FUNCTIONAL_ASSESSMENT: PREVENTS OR INTERFERES SOME ACTIVE ACTIVITIES AND ADLS

## 2022-10-11 ASSESSMENT — PAIN DESCRIPTION - ORIENTATION
ORIENTATION: RIGHT
ORIENTATION: RIGHT

## 2022-10-11 NOTE — PROGRESS NOTES
Daily Progress Note  10/11/2022    Patient Name: Ray Veloz    CHIEF COMPLAINT acute psychosis         SUBJECTIVE:    Nursing staff report that patient has maintained medication adherence. She has not required emergency medications. She continues to utilize trazodone for insomnia and reports with good effect. She is agreeable to assessment common area where she is extremely irritable and has much complaints regarding some man named \"Malcolm\". She insists that this individual was intrusive and would not leave her room when she was utilizing the bathroom. Discussed with patient that nursing team is aware and that her room is very close to line of sight and team will ensure that her privacy is maintained as desired within the protocol of safety checks. Patient is grateful. She is evasive regarding plans of discharge and unwilling to confirm where she will be living. She denies side effects related to her medications, reports adequate sleep and appetite and denies having additional questions or concerns outside of the above noted complaint. Writer encouraged patient to attend groups on the unit. At this time, the patient is not appropriate for a lower level of care. There is risk of decompensation and patient warrants further hospitalization for safety and stabilization. Appetite:  [x] Normal/Adequate/Unchanged  [] Increased  [] Decreased      Sleep:       [x] Normal/Adequate/Unchanged  [] Fair  [] Poor      Group Attendance on Unit:   [] Yes  [] Selectively    [x] No    Medication Side Effects: Patient denies any medication side effects at the time of assessment. Mental Status Exam  Level of consciousness: Alert and awake. Appearance: Appropriate attire for setting, seated in wheelchair, with fair  grooming and hygiene.    Behavior/Motor: Approachable, somewhat aggressive psychomotor slowing and reports chronic weakness and discomfort  Attitude toward examiner: Cooperative, somewhat suspicious, attentive, good to intense eye contact. Speech: Normal rate, increased volume volume irritable tone. Mood:  Patient reports \" I am mad\". Affect: Congruent  Thought processes: Linear, rapid, and illogical.   Thought content: Denies homicidal ideation. Suicidal Ideation: Denies suicidal ideations, without current plan or intent, contracts for safety on the unit. Delusions: Patient presents with delusions. Endorses paranoia. Perceptual Disturbance: Patient does not appear to be responding to internal stimuli. Denies auditory hallucinations. Denies visual hallucinations. Cognition: Oriented to self and situation only. Memory: Impaired. Insight & Judgement: Poor. Data   height is 5' 5\" (1.651 m) and weight is 207 lb (93.9 kg). Her temperature is 98.1 °F (36.7 °C). Her blood pressure is 120/73 and her pulse is 104 (abnormal). Her respiration is 16.    Labs:   Admission on 09/22/2022   Component Date Value Ref Range Status    POC Glucose 09/22/2022 135 (A)  65 - 105 mg/dL Final    POC Glucose 09/23/2022 168 (A)  65 - 105 mg/dL Final    WBC 09/23/2022 5.3  3.5 - 11.0 k/uL Final    RBC 09/23/2022 2.72 (A)  4.0 - 5.2 m/uL Final    Hemoglobin 09/23/2022 8.6 (A)  12.0 - 16.0 g/dL Final    Hematocrit 09/23/2022 26.8 (A)  36 - 46 % Final    MCV 09/23/2022 98.8  80 - 100 fL Final    MCH 09/23/2022 31.8  26 - 34 pg Final    MCHC 09/23/2022 32.2  31 - 37 g/dL Final    RDW 09/23/2022 14.9  11.5 - 14.9 % Final    Platelets 85/68/8370 434  150 - 450 k/uL Final    MPV 09/23/2022 7.8  6.0 - 12.0 fL Final    Glucose 09/23/2022 315 (A)  70 - 99 mg/dL Final    BUN 09/23/2022 13  6 - 20 mg/dL Final    Creatinine 09/23/2022 0.78  0.50 - 0.90 mg/dL Final    Calcium 09/23/2022 8.7  8.6 - 10.4 mg/dL Final    Sodium 09/23/2022 135  135 - 144 mmol/L Final    Potassium 09/23/2022 4.8  3.7 - 5.3 mmol/L Final    Chloride 09/23/2022 101  98 - 107 mmol/L Final    CO2 09/23/2022 24  20 - 31 mmol/L Final    Anion Gap 09/23/2022 10  9 - 17 mmol/L Final    Alkaline Phosphatase 09/23/2022 243 (A)  35 - 104 U/L Final    ALT 09/23/2022 10  5 - 33 U/L Final    AST 09/23/2022 8  <32 U/L Final    Total Bilirubin 09/23/2022 0.2 (A)  0.3 - 1.2 mg/dL Final    Total Protein 09/23/2022 6.8  6.4 - 8.3 g/dL Final    Albumin 09/23/2022 2.7 (A)  3.5 - 5.2 g/dL Final    GFR Non- 09/23/2022 >60  >60 mL/min Final    GFR  09/23/2022 >60  >60 mL/min Final    GFR Comment 09/23/2022        Final    Comment: Average GFR for 52-63 years old:   80 mL/min/1.73sq m  Chronic Kidney Disease:   <60 mL/min/1.73sq m  Kidney failure:   <15 mL/min/1.73sq m              eGFR calculated using average adult body mass. Additional eGFR calculator available at:        [employer]            POC Glucose 09/23/2022 222 (A)  65 - 105 mg/dL Final    POC Glucose 09/23/2022 286 (A)  65 - 105 mg/dL Final    POC Glucose 09/23/2022 237 (A)  65 - 105 mg/dL Final    POC Glucose 09/24/2022 185 (A)  65 - 105 mg/dL Final    POC Glucose 09/24/2022 177 (A)  65 - 105 mg/dL Final    POC Glucose 09/24/2022 241 (A)  65 - 105 mg/dL Final    POC Glucose 09/24/2022 221 (A)  65 - 105 mg/dL Final    POC Glucose 09/24/2022 248 (A)  65 - 105 mg/dL Final    Hepatitis C Ab 09/26/2022 NONREACTIVE  NONREACTIVE Final    Comment:       The hepatitis C procedure used in our laboratory is a Chemiluminescent test specific for   three recombinant HCV antigens. A negative anti-HCV result indicates that the antibodies to   hepatitis C virus are not present at this time. Individuals with reactive anti-HCV should be considered infected and infectious until proven   otherwise. Confirmation of all equivocal or reactive results is recommended by ordering   HCV RNA by PCR.       POC Glucose 09/25/2022 142 (A)  65 - 105 mg/dL Final    POC Glucose 09/25/2022 204 (A)  65 - 105 mg/dL Final    POC Glucose 09/25/2022 255 (A)  65 - 105 mg/dL Final    POC Glucose 09/25/2022 224 (A)  65 - 105 mg/dL Final    HIV Ag/Ab 09/26/2022 NONREACTIVE  NONREACTIVE Final    Comment: No laboratory evidence of HIV infection. If acute HIV infection is suspected, consider   testing for HIV-1 RNA.       POC Glucose 09/26/2022 140 (A)  65 - 105 mg/dL Final    POC Glucose 09/26/2022 248 (A)  65 - 105 mg/dL Final    POC Glucose 09/26/2022 279 (A)  65 - 105 mg/dL Final    POC Glucose 09/26/2022 331 (A)  65 - 105 mg/dL Final    POC Glucose 09/27/2022 196 (A)  65 - 105 mg/dL Final    POC Glucose 09/27/2022 227 (A)  65 - 105 mg/dL Final    POC Glucose 09/27/2022 311 (A)  65 - 105 mg/dL Final    POC Glucose 09/27/2022 295 (A)  65 - 105 mg/dL Final    POC Glucose 09/27/2022 301 (A)  65 - 105 mg/dL Final    POC Glucose 09/28/2022 267 (A)  65 - 105 mg/dL Final    POC Glucose 09/28/2022 334 (A)  65 - 105 mg/dL Final    POC Glucose 09/28/2022 270 (A)  65 - 105 mg/dL Final    POC Glucose 09/28/2022 248 (A)  65 - 105 mg/dL Final    POC Glucose 09/28/2022 251 (A)  65 - 105 mg/dL Final    POC Glucose 09/28/2022 235 (A)  65 - 105 mg/dL Final    Critical Noted    POC Glucose 09/28/2022 323 (A)  65 - 105 mg/dL Final    POC Glucose 09/28/2022 475 (A)  65 - 105 mg/dL Final    Critical Noted    POC Glucose 09/28/2022 452 (A)  65 - 105 mg/dL Final    Critical Noted    POC Glucose 09/28/2022 423 (A)  65 - 105 mg/dL Final    Critical Noted    Glucose 09/29/2022 299 (A)  70 - 99 mg/dL Final    BUN 09/29/2022 14  6 - 20 mg/dL Final    Creatinine 09/29/2022 1.05 (A)  0.50 - 0.90 mg/dL Final    Calcium 09/29/2022 8.4 (A)  8.6 - 10.4 mg/dL Final    Sodium 09/29/2022 138  135 - 144 mmol/L Final    Potassium 09/29/2022 5.0  3.7 - 5.3 mmol/L Final    Chloride 09/29/2022 103  98 - 107 mmol/L Final    CO2 09/29/2022 26  20 - 31 mmol/L Final    Anion Gap 09/29/2022 9  9 - 17 mmol/L Final    GFR Non- 09/29/2022 54 (A)  >60 mL/min Final    GFR  09/29/2022 >60  >60 mL/min Final    GFR Comment 09/29/2022        Final    Comment: Average GFR for 52-63 years old:   80 mL/min/1.73sq m  Chronic Kidney Disease:   <60 mL/min/1.73sq m  Kidney failure:   <15 mL/min/1.73sq m              eGFR calculated using average adult body mass.  Additional eGFR calculator available at:        [employer]            POC Glucose 09/29/2022 153 (A)  65 - 105 mg/dL Final    POC Glucose 09/29/2022 244 (A)  65 - 105 mg/dL Final    POC Glucose 09/29/2022 172 (A)  65 - 105 mg/dL Final    POC Glucose 09/30/2022 162 (A)  65 - 105 mg/dL Final    POC Glucose 09/29/2022 250 (A)  65 - 105 mg/dL Final    POC Glucose 09/30/2022 262 (A)  65 - 105 mg/dL Final    POC Glucose 09/30/2022 199 (A)  65 - 105 mg/dL Final    POC Glucose 09/30/2022 239 (A)  65 - 105 mg/dL Final    POC Glucose 10/01/2022 122 (A)  65 - 105 mg/dL Final    POC Glucose 10/01/2022 179 (A)  65 - 105 mg/dL Final    POC Glucose 10/01/2022 167 (A)  65 - 105 mg/dL Final    POC Glucose 10/01/2022 185 (A)  65 - 105 mg/dL Final    POC Glucose 10/02/2022 166 (A)  65 - 105 mg/dL Final    POC Glucose 10/02/2022 271 (A)  65 - 105 mg/dL Final    POC Glucose 10/02/2022 324 (A)  65 - 105 mg/dL Final    POC Glucose 10/02/2022 315 (A)  65 - 105 mg/dL Final    POC Glucose 10/03/2022 232 (A)  65 - 105 mg/dL Final    POC Glucose 10/03/2022 407 (A)  65 - 105 mg/dL Final    Critical Noted    POC Glucose 10/03/2022 371 (A)  65 - 105 mg/dL Final    POC Glucose 10/03/2022 410 (A)  65 - 105 mg/dL Final    Critical Noted    POC Glucose 10/04/2022 212 (A)  65 - 105 mg/dL Final    Glucose 10/04/2022 505 (A)  70 - 99 mg/dL Final    BUN 10/04/2022 13  6 - 20 mg/dL Final    Creatinine 10/04/2022 1.14 (A)  0.50 - 0.90 mg/dL Final    Calcium 10/04/2022 8.8  8.6 - 10.4 mg/dL Final    Sodium 10/04/2022 133 (A)  135 - 144 mmol/L Final    Potassium 10/04/2022 5.6 (A)  3.7 - 5.3 mmol/L Final    Chloride 10/04/2022 98  98 - 107 mmol/L Final    CO2 10/04/2022 26  20 - 31 mmol/L Final    Anion Gap 10/04/2022 9  9 - 17 mmol/L Final    Est, Glom Filt Rate 10/04/2022 57 (A)  >60 mL/min/1.73m2 Final    Comment:       Effective Oct 3, 2022        These results are not intended for use in patients <25years of age. eGFR results are calculated without a race factor using the 2021 CKD-EPI equation. Careful clinical correlation is recommended, particularly when comparing to results   calculated using previous equations. The CKD-EPI equation is less accurate in patients with extremes of muscle mass, extra-renal   metabolism of creatine, excessive creatine ingestion, or following therapy that affects   renal tubular secretion. POC Glucose 10/04/2022 388 (A)  65 - 105 mg/dL Final    POC Glucose 10/04/2022 332 (A)  65 - 105 mg/dL Final    POC Glucose 10/04/2022 337 (A)  65 - 105 mg/dL Final    POC Glucose 10/05/2022 249 (A)  65 - 105 mg/dL Final    POC Glucose 10/05/2022 384 (A)  65 - 105 mg/dL Final    POC Glucose 10/05/2022 223 (A)  65 - 105 mg/dL Final    Glucose 10/06/2022 227 (A)  70 - 99 mg/dL Final    BUN 10/06/2022 16  6 - 20 mg/dL Final    Creatinine 10/06/2022 0.89  0.50 - 0.90 mg/dL Final    Est, Glom Filt Rate 10/06/2022 >60  >60 mL/min/1.73m2 Final    Comment:       Effective Oct 3, 2022        These results are not intended for use in patients <25years of age. eGFR results are calculated without a race factor using the 2021 CKD-EPI equation. Careful clinical correlation is recommended, particularly when comparing to results   calculated using previous equations. The CKD-EPI equation is less accurate in patients with extremes of muscle mass, extra-renal   metabolism of creatine, excessive creatine ingestion, or following therapy that affects   renal tubular secretion.       Calcium 10/06/2022 8.7  8.6 - 10.4 mg/dL Final    Sodium 10/06/2022 139  135 - 144 mmol/L Final    Potassium 10/06/2022 4.6  3.7 - 5.3 mmol/L Final    Chloride 10/06/2022 105  98 - 107 mmol/L Final CO2 10/06/2022 26  20 - 31 mmol/L Final    Anion Gap 10/06/2022 8 (A)  9 - 17 mmol/L Final    Hemoglobin A1C 10/06/2022 8.0 (A)  4.0 - 6.0 % Final    Estimated Avg Glucose 10/06/2022 183  mg/dL Final    Comment: The ADA and AACC recommend providing the estimated average glucose result to permit better   patient understanding of their HBA1c result. POC Glucose 10/05/2022 304 (A)  65 - 105 mg/dL Final    POC Glucose 10/06/2022 209 (A)  65 - 105 mg/dL Final    POC Glucose 10/06/2022 286 (A)  65 - 105 mg/dL Final    POC Glucose 10/06/2022 286 (A)  65 - 105 mg/dL Final    POC Glucose 10/06/2022 319 (A)  65 - 105 mg/dL Final    POC Glucose 10/07/2022 159 (A)  65 - 105 mg/dL Final    POC Glucose 10/07/2022 349 (A)  65 - 105 mg/dL Final    POC Glucose 10/07/2022 192 (A)  65 - 105 mg/dL Final    POC Glucose 10/07/2022 245 (A)  65 - 105 mg/dL Final    POC Glucose 10/08/2022 233 (A)  65 - 105 mg/dL Final    POC Glucose 10/08/2022 192 (A)  65 - 105 mg/dL Final    POC Glucose 10/08/2022 219 (A)  65 - 105 mg/dL Final    POC Glucose 10/09/2022 138 (A)  65 - 105 mg/dL Final    POC Glucose 10/09/2022 252 (A)  65 - 105 mg/dL Final    POC Glucose 10/09/2022 197 (A)  65 - 105 mg/dL Final    POC Glucose 10/09/2022 242 (A)  65 - 105 mg/dL Final    POC Glucose 10/10/2022 143 (A)  65 - 105 mg/dL Final    POC Glucose 10/10/2022 193 (A)  65 - 105 mg/dL Final    POC Glucose 10/10/2022 185 (A)  65 - 105 mg/dL Final    POC Glucose 10/10/2022 233 (A)  65 - 105 mg/dL Final    POC Glucose 10/11/2022 87  65 - 105 mg/dL Final    POC Glucose 10/11/2022 164 (A)  65 - 105 mg/dL Final         Reviewed patient's current plan of care and vital signs with nursing staff.     Labs reviewed: [x] Yes  Last EKG in EMR reviewed: [x] Yes  QTc: 477    Medications  Current Facility-Administered Medications: insulin glargine (LANTUS) injection vial 38 Units, 38 Units, SubCUTAneous, BID  insulin lispro (HUMALOG) injection vial 0-8 Units, 0-8 Units, SubCUTAneous, TID WC  insulin lispro (HUMALOG) injection vial 0-4 Units, 0-4 Units, SubCUTAneous, Nightly  metFORMIN (GLUCOPHAGE) tablet 850 mg, 850 mg, Oral, BID WC  albuterol sulfate HFA (PROVENTIL;VENTOLIN;PROAIR) 108 (90 Base) MCG/ACT inhaler 2 puff, 2 puff, Inhalation, Q4H PRN  albuterol (PROVENTIL) nebulizer solution 2.5 mg, 2.5 mg, Nebulization, Q6H PRN  rivastigmine (EXELON) 4.6 MG/24HR 1 patch, 1 patch, TransDERmal, Daily  lidocaine 4 % external patch 1 patch, 1 patch, TransDERmal, Daily  atorvastatin (LIPITOR) tablet 40 mg, 40 mg, Oral, Nightly  glucagon (rDNA) injection 1 mg, 1 mg, SubCUTAneous, PRN  glucose chewable tablet 16 g, 4 tablet, Oral, PRN  OLANZapine zydis (ZYPREXA) disintegrating tablet 15 mg, 15 mg, Oral, Nightly  pantoprazole (PROTONIX) tablet 40 mg, 40 mg, Oral, BID  pregabalin (LYRICA) capsule 75 mg, 75 mg, Oral, BID  acetaminophen (TYLENOL) tablet 650 mg, 650 mg, Oral, Q6H PRN  ibuprofen (ADVIL;MOTRIN) tablet 400 mg, 400 mg, Oral, Q6H PRN  hydrOXYzine HCl (ATARAX) tablet 50 mg, 50 mg, Oral, TID PRN  traZODone (DESYREL) tablet 50 mg, 50 mg, Oral, Nightly PRN  polyethylene glycol (GLYCOLAX) packet 17 g, 17 g, Oral, Daily PRN  aluminum & magnesium hydroxide-simethicone (MAALOX) 200-200-20 MG/5ML suspension 30 mL, 30 mL, Oral, Q6H PRN  nicotine polacrilex (NICORETTE) gum 2 mg, 2 mg, Oral, Q2H PRN  haloperidol lactate (HALDOL) injection 5 mg, 5 mg, IntraMUSCular, Q6H PRN **AND** LORazepam (ATIVAN) injection 2 mg, 2 mg, IntraMUSCular, Q6H PRN **AND** diphenhydrAMINE (BENADRYL) injection 50 mg, 50 mg, IntraMUSCular, Q6H PRN    ASSESSMENT  Unspecified psychosis not due to a substance or known physiological condition [facility])         HANDOFF  Patient symptoms are: Showing modest improvement. Team continues to work to establish transfer to appropriate extended care facility  Medications as determined by attending physician  Encourage participation in groups and milieu.   Probable discharge is to be determined by MD    Electronically signed by LAILA Escobedo CNP on 10/11/2022 at 4:16 PM    **This report has been created using voice recognition software. It may contain minor errors which are inherent in voice recognition technology. **  I independently saw and evaluated the patient. I reviewed the nurse practioner's documentation above. Any additional comments or changes to the    documentation are stated below otherwise agree with assessment. The patient's mood remained somewhat irritable. She has been participating in groups and taking her medications without any complaint of side effects      PLAN  Medications as noted above  Attempt to develop insight  Expected Length of Stay is 1-2 days. Psycho-education conducted. Supportive Therapy conducted.   Follow-up daily while on inpatient unit    Electronically signed by Yanique Felipe MD on 10/11/22 at 7:16 PM EDT

## 2022-10-11 NOTE — PROGRESS NOTES
[facility] THERAPY MISSED TREATMENT NOTE   [facility]  Date: 10/11/22  Patient Name: Gladys Staton       Room: 8604/9777-01  MRN: [id number]   Account #: [de-identified]    : 1965  (56 y.o.)  Gender: female   Diagnosis: psychosis, DM, Recent hx: DKA, Acute toxic encephalopathy , upper GI bleed. PMHx: lupus, DDD, White matter changes, CVA, bipolar disorder and PTSD, polysubstance abuse. treated for DKA with IV insulin, acute renal failure with substantial hydration, and hypovolemic shock requiring Levophed, severe hypernatremia >170, concern for GI bleed status post EGD which showed multiple duodenal ulcers treated with PPI. Patient remained confused and delusional, CT head negative, MRI could not be done due to lack of patient cooperation             REASON FOR MISSED TREATMENT:  Patient refusal   -    Staff stated that patient is refusing therapy at this time. Will continue to follow.            TORO Middleton

## 2022-10-11 NOTE — PROGRESS NOTES
Physical Therapy  DATE: 10/11/2022    NAME: Nida Blevins  MRN: [id number]   : 1965    Patient not seen this date for Physical Therapy due to:      [] Cancel by RN or physician due to:    [] Hemodialysis    [] Critical Lab Value Level     [] Blood transfusion in progress    [] Acute or unstable cardiovascular status   _MAP < 55 or more than >115  _HR < 40 or > 130    [] Acute or unstable pulmonary status   -FiO2 > 60%   _RR < 5 or >40    _O2 sats < 85%    [] Strict Bedrest    [] Off Unit for surgery or procedure    [] Off Unit for testing       [] Pending imaging to R/O fracture    [x] Refusal by Patient; called unit @ 1030. Staff stated that patient is refusing therapy at this time. Will continue to follow. [] Other      [] PT being discontinued at this time. Patient independent. No further needs. [] PT being discontinued at this time as the patient has been transferred to hospice care. No further needs.       Electronically signed by Cherie Nascimento PTA on 10/11/22 at 2:54 PM EDT

## 2022-10-11 NOTE — PLAN OF CARE
Problem: Chronic Conditions and Co-morbidities  Goal: Patient's chronic conditions and co-morbidity symptoms are monitored and maintained or improved  10/10/2022 2233 by Ericka Yarbrough RN  Outcome: Progressing  Patient blood sugar was 233 this evening. Problem: Anxiety  Goal: Will report anxiety at manageable levels  Description: INTERVENTIONS:  1. Administer medication as ordered  2. Teach and rehearse alternative coping skills  3. Provide emotional support with 1:1 interaction with staff  10/10/2022 2233 by Ericka Yarbrough RN  Outcome: Progressing  Patient reports anxiety related to upcoming discharge. She is tearful at times but more social with peers than previously. Safety plan reviewed with patient, agrees to approach staff when feeling upset. 15 minute and random checks maintained for safety. No violent or escalating behaviors noted during this shift. Patient is currently calm, controlled and medication-compliant. Patient denies suicidal ideation, homicidal ideation and hallucinations at this time. Problem: Confusion  Goal: Confusion, delirium, dementia, or psychosis is improved or at baseline  Description: INTERVENTIONS:  1. Assess for possible contributors to thought disturbance, including medications, impaired vision or hearing, underlying metabolic abnormalities, dehydration, psychiatric diagnoses, and notify attending LIP  2. [place] high risk fall precautions, as indicated  3. Provide frequent short contacts to provide reality reorientation, refocusing and direction  4. Decrease environmental stimuli, including noise as appropriate  5. Monitor and intervene to maintain adequate nutrition, hydration, elimination, sleep and activity  6. If unable to ensure safety without constant attention obtain sitter and review sitter guidelines with assigned personnel  7.  Initiate Psychosocial CNS and Spiritual Care consult, as indicated  10/10/2022 2233 by Ericka Yarbrough RN  Outcome: Progressing  Patient displays no confusion at this time. Problem: Safety - Adult  Goal: Free from fall injury  10/10/2022 2233 by Memo Estrada RN  Outcome: Progressing  Patient remains free of falls and verbalizes understanding of individual fall risks. Patient is wearing non-skid footwear and encouraged to seek out staff for any assistance needed. Problem: Pain  Goal: Verbalizes/displays adequate comfort level or baseline comfort level  10/10/2022 2233 by Memo Estrada RN  Outcome: Progressing  Patient reports pain that is uncontrolled by available medications. Problem: Self Harm/Suicidality  Goal: Will have no self-injury during hospital stay  Description: INTERVENTIONS:  1. Q 30 MINUTES: Routine safety checks  2. Q SHIFT & PRN: Assess risk to determine if routine checks are adequate to maintain patient safety  10/10/2022 2233 by Memo Estrada RN  Outcome: Progressing  Patient has made no attempt to harm self at this time.

## 2022-10-11 NOTE — GROUP NOTE
Group Therapy Note    Date: 10/11/2022    Group Start Time: 1430  Group End Time: 0608  Group Topic: Cognitive Skills    CHRISTINA Sorenson        Group Therapy Note    Attendees: 9/20       Patient's Goal:  To increase social interaction and to practice expressing feelings, identifying negative patterns, and new positive patterns r/t thoughts, feelings,behaviors. Notes: Pt participated fully in group . Pt was able to practice expressing feelings, identifying negative patterns, and new positive patterns r/t thoughts, feelings,behaviors. using creative expression and discussion. Pt shared individually, and engaged in group discussion. Pt was supportive of peers and able to relate to some of their ideas and experiences. Status After Intervention:  Improved         Participation Level: Active Listener and Interactive     Participation Quality: Appropriate, Attentive, Sharing and Supportive        Speech:  Normal        Thought Process/Content: Logical , linear.      Affective Functioning: Congruent     Mood: euthymic        Level of consciousness:  Alert, Oriented x4 and Attentive        Response to Learning: Able to verbalize current knowledge/experience, Able to verbalize/acknowledge new learning , capable of insight, and Progressing to goal        Endings: None Reported     Modes of Intervention: Education, Support, Socialization, Exploration, Clarifying and Problem-solving        Discipline Responsible: Psychoeducational Specialist      Signature:  Julia Wharton

## 2022-10-11 NOTE — PROGRESS NOTES
[facility] Internal Medicine    Progress note             Date:   10/11/2022  Patient name:  Ingrid Bateman  Date of admission:  9/22/2022  3:40 PM  MRN:   [id number]  Account:  [de-identified]  YOB: 1965  PCP:    Barney Zaidi MD  Room:   [address] [place] [address]  Code Status:    Full Code    Physician Requesting Consult: Radha Odell MD    Reason for Consult: History and physical, medical management    Chief Complaint:     No chief complaint on file. Medical management  Dm  '  History Obtained From:     Patient, EMR, nursing staff    History of Present Illness:     56-year-old female admitted for acute psychotic state    She was initially admitted to medical unit after being found unresponsive at her home, treated for DKA with IV insulin, acute renal failure with substantial hydration, and hypovolemic shock requiring Levophed, severe hypernatremia >170, concern for GI bleed status post EGD which showed multiple duodenal ulcers treated with PPI.   Patient remained confused and delusional, CT head negative, MRI could not be done due to lack of patient cooperation      Medical history includes asthma, hypertension, stroke, type 2 diabetes, polysubstance abuse, recent ischemic stroke  Past Medical History:     Past Medical History:   Diagnosis Date    Acid reflux 5/29/2017    Acute cystitis with hematuria 10/11/2016    Acute non-recurrent maxillary sinusitis 11/28/2017    Asthma     Bipolar 1 disorder ([address] 1/4/2018    Bipolar disorder, mixed ([address]     BMI 34.0-34.9,adult 11/28/2017    Cannabis use disorder, severe, dependence ([address] 12/19/2017    Cerebrovascular accident (CVA) ([address] 6/14/2017    Chest pain 11/5/2016    Chronic renal insufficiency     Cocaine abuse ([address] 1/5/2018    COVID-19 virus RNA test result unknown     DDD (degenerative disc disease), cervical     Diabetes mellitus ([address]     Dizziness 6/13/2017    Fibromyalgia     History of stroke 9/9/2017 Homicidal ideation 11/6/2017    Hyperglycemia     Hypertension     Hypotension 1/18/2019    IDDM (insulin dependent diabetes mellitus) 12/21/2015    Lupus ([address]     Migraine     Neuropathy     Neuropathy     Polysubstance abuse ([address] 9/17/2017    Post traumatic stress disorder (PTSD)     Posttraumatic stress disorder 5/29/2017    Recurrent depression ([address] 5/29/2017    Recurrent major depressive disorder, in partial remission ([address] 11/28/2017    Screening mammogram, encounter for 11/28/2017    Severe recurrent major depression with psychotic features ([address] 12/19/2017    Severe recurrent major depression without psychotic features ([address] 12/19/2017    Stroke (cerebrum) ([address] 6/14/2017    Stroke ([address]     per chart notes    Suicidal ideation     Suicidal intent 3/10/2017    Vitamin D deficiency 11/28/2017    White matter changes 6/13/2017        Past Surgical History:     Past Surgical History:   Procedure Laterality Date    ABDOMEN SURGERY      drain tube    ABSCESS DRAINAGE      right buttock    CATARACT REMOVAL WITH IMPLANT Bilateral     CHEST TUBE INSERTION      FINGER AMPUTATION Left 03/13/2021    LEFT RING FINER AMPUTATION performed by Martin Carmichael MD at [address] [facility] Right 10/11/2021    AMPUTATION RIGHT INDEX FINGER performed by Martin Carmichael MD at [address] Right 1/27/2022    FOOT ABSCESS INCISION AND DRAINAGE  (PULSE LAVAGE, CULTURE SWABS) performed by [facility], DPM at [facility] Right 01/27/2022    FOOT ABSCESS INCISION AND DRAINAGE (PULSE LAVAGE, CULTURE SWABS) - Right    HAND SURGERY Right 09/14/2021    I&D INDEX FINGER performed by Martin Carmichael MD at [address] [facility] Right 09/15/2021    I&D INDEX FINGER performed by Martin Carmichael MD at [address]  2/3/2022         LASIK Bilateral     UPPER GASTROINTESTINAL ENDOSCOPY N/A 9/16/2022    EGD BIOPSY performed by Memo Zayas MD at [place] [address] Medications Prior to Admission:     Prior to Admission medications    Medication Sig Start Date End Date Taking? Authorizing Provider   atorvastatin (LIPITOR) 40 MG tablet Take 1 tablet by mouth nightly 9/27/22 12/26/22 Yes Cherrei Kemp MD   blood glucose monitor strips Test 3 times a day & as needed for symptoms of irregular blood glucose. Dispense sufficient amount for indicated testing frequency plus additional to accommodate PRN testing needs. 9/27/22  Yes Cherrie Kemp MD   metFORMIN (GLUCOPHAGE) 1000 MG tablet Take 1 tablet by mouth 2 times daily (with meals) 9/27/22  Yes Cherrie Kemp MD   traZODone (DESYREL) 50 MG tablet Take 1 tablet by mouth nightly as needed for Sleep 9/27/22  Yes Cherrie Kemp MD   pantoprazole (PROTONIX) 40 MG tablet Take 1 tablet by mouth 2 times daily 9/27/22  Yes Cherrie Kemp MD   OLANZapine (ZYPREXA) 10 MG tablet Take 1.5 tablets by mouth nightly 9/27/22  Yes Cherrie Kemp MD   insulin glargine (LANTUS SOLOSTAR) 100 UNIT/ML injection pen Inject 30 Units into the skin 2 times daily 4/22/22   Robin Callahan MD   fluticasone (FLONASE) 50 MCG/ACT nasal spray 1 spray by Each Nostril route daily 3/15/22   Robin Callahan MD   Alcohol Swabs 70 % PADS Use 1 swab 3 times daily as needed 3/15/22   Robin Callahan MD   Lancets MISC 1 each by Does not apply route 3 times daily 3/15/22   Robin Callahan MD   Insulin Pen Needle ([employer] PEN NEEDLES 31G) 31G X 8 MM MISC 1 each by Does not apply route daily 3/15/22   Robin Callahan MD   budesonide-formoterol (SYMBICORT) 80-4.5 MCG/ACT AERO Inhale 2 puffs into the lungs 2 times daily 3/15/22   Robin Callahan MD   albuterol sulfate  (90 Base) MCG/ACT inhaler Inhale 2 puffs into the lungs every 4 hours as needed for Wheezing 3/15/22 4/15/22  Robin Callahan MD   Misc.  Devices MISC 1 PAIR OF DIABETIC SHOES (1 LEFT/ 1 RIGHT)  1-3 PAIRS OF INSERTS (LEFT/ RIGHT) 2/15/22   Phillip Bennett DPM        Allergies: Bactrim [sulfamethoxazole-trimethoprim] and Adhesive tape    Social History:     Tobacco:    reports that she has never smoked. She has never used smokeless tobacco.  Alcohol:      reports that she does not currently use alcohol. Drug Use:  reports current drug use. Drugs: Marijuana (Weed) and Cocaine. Family History:     Family History   Problem Relation Age of Onset    Diabetes Mother     Stroke Mother     Diabetes Father     Diabetes Sister     Diabetes Brother     Other Son         GSW    No Known Problems Sister        Review of Systems:     Positive and Negative as described in HPI. Denies any shortness of breath or cough  Denies chest pain or palpitations  Denies diarrhea vomiting. Reports mild epigastric pain  Denies any new numbness tremors or weakness. 10 point review of systems was negative other than mentioned above    Physical Exam:     /73   Pulse (!) 104   Temp 98.1 °F (36.7 °C)   Resp 16   Ht 5' 5\" (1.651 m)   Wt 207 lb (93.9 kg)   LMP  (LMP Unknown)   PF (!) 14 L/min   BMI 34.45 kg/m²   Temp (24hrs), Av.4 °F (36.9 °C), Min:98.1 °F (36.7 °C), Max:98.6 °F (37 °C)    Recent Labs     10/10/22  1642 10/10/22  2018 10/11/22  0811 10/11/22  1149   POCGLU 185* 233* 87 164*     No intake or output data in the 24 hours ending 10/11/22 1338    General Appearance:  alert, well appearing, and in no acute distress  Head:  normocephalic, atraumatic. Eye: no icterus, redness, pupils equal and reactive, extraocular eye movements intact, conjunctiva clear  Ear: normal external ear, no discharge, hearing intact  Nose:  no drainage noted  Mouth: mucous membranes moist  Neck: supple, no carotid bruits, thyroid not palpable  Lungs: Bilateral equal air entry, clear to ausculation, no wheezing, rales or rhonchi, normal effort  Cardiovascular: normal rate, regular rhythm, no murmur, gallop, rub.   Abdomen: Soft, nontender, nondistended, normal bowel sounds, no hepatomegaly or splenomegaly  Neurologic: difficult to assess, limited pt co-operation. Alert, oriented X 1-2, mobile, power equal all 4 limibs. Normal speech but abnormal content, confabulation present. No focal neurology otherwise  Skin: No gross lesions, rashes, bruising or bleeding on exposed skin area  Extremities:  No joint swelling, no pedal edema or calf pain with palpation      Investigations:      Laboratory Testing:  Recent Results (from the past 24 hour(s))   POC Glucose Fingerstick    Collection Time: 10/10/22  4:42 PM   Result Value Ref Range    POC Glucose 185 (H) 65 - 105 mg/dL   POC Glucose Fingerstick    Collection Time: 10/10/22  8:18 PM   Result Value Ref Range    POC Glucose 233 (H) 65 - 105 mg/dL   POC Glucose Fingerstick    Collection Time: 10/11/22  8:11 AM   Result Value Ref Range    POC Glucose 87 65 - 105 mg/dL   POC Glucose Fingerstick    Collection Time: 10/11/22 11:49 AM   Result Value Ref Range    POC Glucose 164 (H) 65 - 105 mg/dL       Imaging/Diagonstics:  Recent data reviewed    Assessment :      Primary Problem  Unspecified psychosis not due to a substance or known physiological condition [facility])    Active Hospital Problems    Diagnosis Date Noted    Unsp psychosis not due to a substance or known physiol cond ([address] [F29] 09/28/2022     Priority: Medium    Fentanyl use disorder, mild ([address] [F11.10] 09/23/2022     Priority: Medium    Unspecified psychosis not due to a substance or known physiological condition ([address] [F29] 09/22/2022     Priority: Medium    Cocaine use disorder ([facility] [F14.10] 01/05/2018       Plan:     Medications: Allergies:     Allergies   Allergen Reactions    Bactrim [Sulfamethoxazole-Trimethoprim] Swelling    Adhesive Tape Rash       Current Meds:   Scheduled Meds:    insulin glargine  38 Units SubCUTAneous BID    insulin lispro  0-8 Units SubCUTAneous TID WC    insulin lispro  0-4 Units SubCUTAneous Nightly    metFORMIN  850 mg Oral BID WC    rivastigmine  1 patch TransDERmal Daily    lidocaine  1 patch TransDERmal Daily    atorvastatin  40 mg Oral Nightly    OLANZapine zydis  15 mg Oral Nightly    pantoprazole  40 mg Oral BID    pregabalin  75 mg Oral BID     Continuous Infusions:   PRN Meds: albuterol sulfate HFA, albuterol, glucagon (rDNA), glucose, acetaminophen, ibuprofen, hydrOXYzine HCl, traZODone, polyethylene glycol, aluminum & magnesium hydroxide-simethicone, nicotine polacrilex, haloperidol lactate **AND** LORazepam **AND** diphenhydrAMINE    Reason for consult: General medical management     Acute psychosis - management per psych-  DKA- resolved  Type 2 DM- BG lantus, SSI- BG controlled, resume statin, metformin  Lantus 38  daily   Acute renal failure-resolved  Acute toxic encephalopathy- still encephalopathic  Hypertension- currently off meds- BP controlled  Upper GI bleed, duodenal ulcers-  c/w PPI  Recent stroke- off ASA due to recent GI bleed- no active bleed      DVT prophylaxis-not required, patient is mobile        Consultations:   Christine Rodriguez MD  10/11/2022  1:38 PM    Copy sent to Margarito Mary MD    Please note that this chart was generated using voice recognition Dragon dictation software. Although every effort was made to ensure the accuracy of this automated transcription, some errors in transcription may have occurred.

## 2022-10-11 NOTE — PLAN OF CARE
Problem: Confusion  Goal: Confusion, delirium, dementia, or psychosis is improved or at baseline  Description: INTERVENTIONS:  1. Assess for possible contributors to thought disturbance, including medications, impaired vision or hearing, underlying metabolic abnormalities, dehydration, psychiatric diagnoses, and notify attending LIP  2. [place] high risk fall precautions, as indicated  3. Provide frequent short contacts to provide reality reorientation, refocusing and direction  4. Decrease environmental stimuli, including noise as appropriate  5. Monitor and intervene to maintain adequate nutrition, hydration, elimination, sleep and activity  6. If unable to ensure safety without constant attention obtain sitter and review sitter guidelines with assigned personnel  7. Initiate Psychosocial CNS and Spiritual Care consult, as indicated  Outcome: Progressing  Flowsheets (Taken 10/2/2022 2346 by Senia Adler RN)  Effect of thought disturbance (confusion, delirium, dementia, or psychosis) are managed with adequate functional status:   Assess for contributors to thought disturbance, including medications, impaired vision or hearing, underlying metabolic abnormalities, dehydration, psychiatric diagnoses, notify Count includes the [facility] high risk fall precautions, as indicated   Provide frequent short contacts to provide reality reorientation, refocusing and direction   Decrease environmental stimuli, including noise as appropriate   Monitor and intervene to maintain adequate nutrition, hydration, elimination, sleep and activity  Note: No confusion or delirium noted at this time. Patient appears to be at baseline.       Problem: Safety - Adult  Goal: Free from fall injury  Outcome: Progressing  Flowsheets (Taken 10/2/2022 2345 by Senia Adler RN)  Free From Fall Injury:   Instruct family/caregiver on patient safety   Based on caregiver fall risk screen, instruct family/caregiver to ask for assistance with transferring infant if caregiver noted to have fall risk factors  Note:   Patient remains free of falls and verbalizes understanding of individual fall risks. Patient uses wheelchair when up and transfers per self. Patient uses call light when assistance is needed. Patient wearing non skid footwear and encouraged to seek out staff for any assistance needed. Problem: Self Harm/Suicidality  Goal: Will have no self-injury during hospital stay  Description: INTERVENTIONS:  1. Q 30 MINUTES: Routine safety checks  2. Q SHIFT & PRN: Assess risk to determine if routine checks are adequate to maintain patient safety  Outcome: Progressing  Note:    Patient denies thoughts of suicide or self-harm and agrees to seek out staff should negative thoughts arise. Patient denies hallucinations. Patient reports anxiety related to upcoming discharge and going somewhere new. Patient labile and irritable at times but is easily redirected. Attending groups and tending to ADL's. Patient is compliant with meds. Safe environment maintained. Q15 minute checks for safety continued per unit policy.  Will continue to monitor for safety and provide support and reassurance as needed

## 2022-10-12 LAB
GLUCOSE BLD-MCNC: 101 MG/DL (ref 65–105)
GLUCOSE BLD-MCNC: 149 MG/DL (ref 65–105)
GLUCOSE BLD-MCNC: 153 MG/DL (ref 65–105)
GLUCOSE BLD-MCNC: 311 MG/DL (ref 65–105)
GLUCOSE BLD-MCNC: 89 MG/DL (ref 65–105)

## 2022-10-12 PROCEDURE — 1240000000 HC EMOTIONAL WELLNESS R&B

## 2022-10-12 PROCEDURE — 6370000000 HC RX 637 (ALT 250 FOR IP): Performed by: INTERNAL MEDICINE

## 2022-10-12 PROCEDURE — 6370000000 HC RX 637 (ALT 250 FOR IP): Performed by: NURSE PRACTITIONER

## 2022-10-12 PROCEDURE — 99232 SBSQ HOSP IP/OBS MODERATE 35: CPT | Performed by: PSYCHIATRY & NEUROLOGY

## 2022-10-12 PROCEDURE — APPSS30 APP SPLIT SHARED TIME 16-30 MINUTES: Performed by: PSYCHIATRY & NEUROLOGY

## 2022-10-12 PROCEDURE — 6370000000 HC RX 637 (ALT 250 FOR IP): Performed by: PSYCHIATRY & NEUROLOGY

## 2022-10-12 PROCEDURE — 99232 SBSQ HOSP IP/OBS MODERATE 35: CPT | Performed by: INTERNAL MEDICINE

## 2022-10-12 PROCEDURE — 82947 ASSAY GLUCOSE BLOOD QUANT: CPT

## 2022-10-12 RX ADMIN — INSULIN GLARGINE 38 UNITS: 100 INJECTION, SOLUTION SUBCUTANEOUS at 08:53

## 2022-10-12 RX ADMIN — PREGABALIN 75 MG: 75 CAPSULE ORAL at 08:53

## 2022-10-12 RX ADMIN — OLANZAPINE 15 MG: 10 TABLET, ORALLY DISINTEGRATING ORAL at 20:36

## 2022-10-12 RX ADMIN — METFORMIN HYDROCHLORIDE 850 MG: 850 TABLET ORAL at 17:28

## 2022-10-12 RX ADMIN — HYDROXYZINE HYDROCHLORIDE 50 MG: 50 TABLET ORAL at 20:37

## 2022-10-12 RX ADMIN — ATORVASTATIN CALCIUM 40 MG: 20 TABLET, FILM COATED ORAL at 20:36

## 2022-10-12 RX ADMIN — INSULIN GLARGINE 38 UNITS: 100 INJECTION, SOLUTION SUBCUTANEOUS at 20:44

## 2022-10-12 RX ADMIN — PANTOPRAZOLE SODIUM 40 MG: 40 TABLET, DELAYED RELEASE ORAL at 08:53

## 2022-10-12 RX ADMIN — INSULIN LISPRO 6 UNITS: 100 INJECTION, SOLUTION INTRAVENOUS; SUBCUTANEOUS at 12:27

## 2022-10-12 RX ADMIN — PREGABALIN 75 MG: 75 CAPSULE ORAL at 20:38

## 2022-10-12 RX ADMIN — PANTOPRAZOLE SODIUM 40 MG: 40 TABLET, DELAYED RELEASE ORAL at 20:38

## 2022-10-12 RX ADMIN — TRAZODONE HYDROCHLORIDE 50 MG: 50 TABLET ORAL at 20:37

## 2022-10-12 RX ADMIN — METFORMIN HYDROCHLORIDE 850 MG: 850 TABLET ORAL at 08:53

## 2022-10-12 NOTE — PLAN OF CARE
Problem: Chronic Conditions and Co-morbidities  Goal: Patient's chronic conditions and co-morbidity symptoms are monitored and maintained or improved  Outcome: Progressing     Problem: Safety - Adult  Goal: Free from fall injury  Outcome: Progressing  Note: Patient remains free of falls and verbalizes understanding of individual fall risks. Patient utilizes wheelchair in the dayroom and is wearing non skid footwear and encouraged to seek out staff for any assistance needed. Problem: Pain  Goal: Verbalizes/displays adequate comfort level or baseline comfort level  10/12/2022 1745 by Mike Messer LPN  Outcome: Progressing  Flowsheets (Taken 10/2/2022 2347 by Giovanni Gordon RN)  Verbalizes/displays adequate comfort level or baseline comfort level:   Encourage patient to monitor pain and request assistance   Assess pain using appropriate pain scale   Administer analgesics based on type and severity of pain and evaluate response   Implement non-pharmacological measures as appropriate and evaluate response   Consider cultural and social influences on pain and pain management  Note: No pain reported this shift. 10/12/2022 1719 by Mike Messer LPN  Outcome: Progressing  Flowsheets (Taken 10/2/2022 2347 by Giovanni Gordon RN)  Verbalizes/displays adequate comfort level or baseline comfort level:   Encourage patient to monitor pain and request assistance   Assess pain using appropriate pain scale   Administer analgesics based on type and severity of pain and evaluate response   Implement non-pharmacological measures as appropriate and evaluate response   Consider cultural and social influences on pain and pain management  Note: No pain reported this shift.

## 2022-10-12 NOTE — PROGRESS NOTES
Daily Progress Note  10/12/2022    Patient Name: Robby Elliott    CHIEF COMPLAINT acute psychosis         SUBJECTIVE:    Nursing staff report that patient has maintained medication adherence. She has not required emergency medications. She is more visible on the unit today utilizing the telephone frequently. She shares that she has been talking with her sister. She does verbalize some frustration as she wants to transition as soon as possible to a care facility. She is provided the name and location of pending assigned ECF and is aware that approval has not yet been completed. She has not attended group programming stating \"I am tired of all this \". Writer encouraged patient to reconsider attend groups on the unit as she has verbalized benefits previously. She denies having any additional questions or concerns regarding her treatment plan at this time. Appetite:  [x] Normal/Adequate/Unchanged  [] Increased  [] Decreased      Sleep:       [x] Normal/Adequate/Unchanged  [] Fair  [] Poor      Group Attendance on Unit:   [] Yes  [] Selectively    [x] No    Medication Side Effects: Patient denies any medication side effects at the time of assessment. Mental Status Exam  Level of consciousness: Alert and awake. Appearance: Appropriate attire for setting, seated in wheelchair, with fair  grooming and hygiene. Behavior/Motor: Approachable, slightly tearful, psychomotor slowing and reports chronic weakness and discomfort  Attitude toward examiner: Cooperative, attentive, good to intense eye contact. Speech: Normal rate, increased volume volume irritable tone. Mood:  Patient reports \" I am tired\". Affect: Congruent  Thought processes: Linear, rapid, and illogical.   Thought content: Denies homicidal ideation. Suicidal Ideation: Denies suicidal ideations, without current plan or intent, contracts for safety on the unit. Delusions: Patient presents with delusions.  Endorses paranoia. Perceptual Disturbance: Patient does not appear to be responding to internal stimuli. Denies auditory hallucinations. Denies visual hallucinations. Cognition: Oriented to self and situation only. Memory: Impaired. Insight & Judgement: Poor. Data   height is 5' 5\" (1.651 m) and weight is 207 lb (93.9 kg). Her temperature is 97.9 °F (36.6 °C). Her blood pressure is 122/73 and her pulse is 94. Her respiration is 18.    Labs:   Admission on 09/22/2022   Component Date Value Ref Range Status    POC Glucose 09/22/2022 135 (A)  65 - 105 mg/dL Final    POC Glucose 09/23/2022 168 (A)  65 - 105 mg/dL Final    WBC 09/23/2022 5.3  3.5 - 11.0 k/uL Final    RBC 09/23/2022 2.72 (A)  4.0 - 5.2 m/uL Final    Hemoglobin 09/23/2022 8.6 (A)  12.0 - 16.0 g/dL Final    Hematocrit 09/23/2022 26.8 (A)  36 - 46 % Final    MCV 09/23/2022 98.8  80 - 100 fL Final    MCH 09/23/2022 31.8  26 - 34 pg Final    MCHC 09/23/2022 32.2  31 - 37 g/dL Final    RDW 09/23/2022 14.9  11.5 - 14.9 % Final    Platelets 87/79/3180 434  150 - 450 k/uL Final    MPV 09/23/2022 7.8  6.0 - 12.0 fL Final    Glucose 09/23/2022 315 (A)  70 - 99 mg/dL Final    BUN 09/23/2022 13  6 - 20 mg/dL Final    Creatinine 09/23/2022 0.78  0.50 - 0.90 mg/dL Final    Calcium 09/23/2022 8.7  8.6 - 10.4 mg/dL Final    Sodium 09/23/2022 135  135 - 144 mmol/L Final    Potassium 09/23/2022 4.8  3.7 - 5.3 mmol/L Final    Chloride 09/23/2022 101  98 - 107 mmol/L Final    CO2 09/23/2022 24  20 - 31 mmol/L Final    Anion Gap 09/23/2022 10  9 - 17 mmol/L Final    Alkaline Phosphatase 09/23/2022 243 (A)  35 - 104 U/L Final    ALT 09/23/2022 10  5 - 33 U/L Final    AST 09/23/2022 8  <32 U/L Final    Total Bilirubin 09/23/2022 0.2 (A)  0.3 - 1.2 mg/dL Final    Total Protein 09/23/2022 6.8  6.4 - 8.3 g/dL Final    Albumin 09/23/2022 2.7 (A)  3.5 - 5.2 g/dL Final    GFR Non- 09/23/2022 >60  >60 mL/min Final    GFR  09/23/2022 >60  >60 mL/min Final GFR Comment 09/23/2022        Final    Comment: Average GFR for 52-63 years old:   80 mL/min/1.73sq m  Chronic Kidney Disease:   <60 mL/min/1.73sq m  Kidney failure:   <15 mL/min/1.73sq m              eGFR calculated using average adult body mass. Additional eGFR calculator available at:        [email or web address]            POC Glucose 09/23/2022 222 (A)  65 - 105 mg/dL Final    POC Glucose 09/23/2022 286 (A)  65 - 105 mg/dL Final    POC Glucose 09/23/2022 237 (A)  65 - 105 mg/dL Final    POC Glucose 09/24/2022 185 (A)  65 - 105 mg/dL Final    POC Glucose 09/24/2022 177 (A)  65 - 105 mg/dL Final    POC Glucose 09/24/2022 241 (A)  65 - 105 mg/dL Final    POC Glucose 09/24/2022 221 (A)  65 - 105 mg/dL Final    POC Glucose 09/24/2022 248 (A)  65 - 105 mg/dL Final    Hepatitis C Ab 09/26/2022 NONREACTIVE  NONREACTIVE Final    Comment:       The hepatitis C procedure used in our laboratory is a Chemiluminescent test specific for   three recombinant HCV antigens. A negative anti-HCV result indicates that the antibodies to   hepatitis C virus are not present at this time. Individuals with reactive anti-HCV should be considered infected and infectious until proven   otherwise. Confirmation of all equivocal or reactive results is recommended by ordering   HCV RNA by PCR. POC Glucose 09/25/2022 142 (A)  65 - 105 mg/dL Final    POC Glucose 09/25/2022 204 (A)  65 - 105 mg/dL Final    POC Glucose 09/25/2022 255 (A)  65 - 105 mg/dL Final    POC Glucose 09/25/2022 224 (A)  65 - 105 mg/dL Final    HIV Ag/Ab 09/26/2022 NONREACTIVE  NONREACTIVE Final    Comment: No laboratory evidence of HIV infection. If acute HIV infection is suspected, consider   testing for HIV-1 RNA.       POC Glucose 09/26/2022 140 (A)  65 - 105 mg/dL Final    POC Glucose 09/26/2022 248 (A)  65 - 105 mg/dL Final    POC Glucose 09/26/2022 279 (A)  65 - 105 mg/dL Final    POC Glucose 09/26/2022 331 (A)  65 - 105 mg/dL Final POC Glucose 09/27/2022 196 (A)  65 - 105 mg/dL Final    POC Glucose 09/27/2022 227 (A)  65 - 105 mg/dL Final    POC Glucose 09/27/2022 311 (A)  65 - 105 mg/dL Final    POC Glucose 09/27/2022 295 (A)  65 - 105 mg/dL Final    POC Glucose 09/27/2022 301 (A)  65 - 105 mg/dL Final    POC Glucose 09/28/2022 267 (A)  65 - 105 mg/dL Final    POC Glucose 09/28/2022 334 (A)  65 - 105 mg/dL Final    POC Glucose 09/28/2022 270 (A)  65 - 105 mg/dL Final    POC Glucose 09/28/2022 248 (A)  65 - 105 mg/dL Final    POC Glucose 09/28/2022 251 (A)  65 - 105 mg/dL Final    POC Glucose 09/28/2022 235 (A)  65 - 105 mg/dL Final    Critical Noted    POC Glucose 09/28/2022 323 (A)  65 - 105 mg/dL Final    POC Glucose 09/28/2022 475 (A)  65 - 105 mg/dL Final    Critical Noted    POC Glucose 09/28/2022 452 (A)  65 - 105 mg/dL Final    Critical Noted    POC Glucose 09/28/2022 423 (A)  65 - 105 mg/dL Final    Critical Noted    Glucose 09/29/2022 299 (A)  70 - 99 mg/dL Final    BUN 09/29/2022 14  6 - 20 mg/dL Final    Creatinine 09/29/2022 1.05 (A)  0.50 - 0.90 mg/dL Final    Calcium 09/29/2022 8.4 (A)  8.6 - 10.4 mg/dL Final    Sodium 09/29/2022 138  135 - 144 mmol/L Final    Potassium 09/29/2022 5.0  3.7 - 5.3 mmol/L Final    Chloride 09/29/2022 103  98 - 107 mmol/L Final    CO2 09/29/2022 26  20 - 31 mmol/L Final    Anion Gap 09/29/2022 9  9 - 17 mmol/L Final    GFR Non- 09/29/2022 54 (A)  >60 mL/min Final    GFR  09/29/2022 >60  >60 mL/min Final    GFR Comment 09/29/2022        Final    Comment: Average GFR for 52-63 years old:   80 mL/min/1.73sq m  Chronic Kidney Disease:   <60 mL/min/1.73sq m  Kidney failure:   <15 mL/min/1.73sq m              eGFR calculated using average adult body mass.  Additional eGFR calculator available at:        [email or web address]            POC Glucose 09/29/2022 153 (A)  65 - 105 mg/dL Final    POC Glucose 09/29/2022 244 (A)  65 - 105 mg/dL Final POC Glucose 09/29/2022 172 (A)  65 - 105 mg/dL Final    POC Glucose 09/30/2022 162 (A)  65 - 105 mg/dL Final    POC Glucose 09/29/2022 250 (A)  65 - 105 mg/dL Final    POC Glucose 09/30/2022 262 (A)  65 - 105 mg/dL Final    POC Glucose 09/30/2022 199 (A)  65 - 105 mg/dL Final    POC Glucose 09/30/2022 239 (A)  65 - 105 mg/dL Final    POC Glucose 10/01/2022 122 (A)  65 - 105 mg/dL Final    POC Glucose 10/01/2022 179 (A)  65 - 105 mg/dL Final    POC Glucose 10/01/2022 167 (A)  65 - 105 mg/dL Final    POC Glucose 10/01/2022 185 (A)  65 - 105 mg/dL Final    POC Glucose 10/02/2022 166 (A)  65 - 105 mg/dL Final    POC Glucose 10/02/2022 271 (A)  65 - 105 mg/dL Final    POC Glucose 10/02/2022 324 (A)  65 - 105 mg/dL Final    POC Glucose 10/02/2022 315 (A)  65 - 105 mg/dL Final    POC Glucose 10/03/2022 232 (A)  65 - 105 mg/dL Final    POC Glucose 10/03/2022 407 (A)  65 - 105 mg/dL Final    Critical Noted    POC Glucose 10/03/2022 371 (A)  65 - 105 mg/dL Final    POC Glucose 10/03/2022 410 (A)  65 - 105 mg/dL Final    Critical Noted    POC Glucose 10/04/2022 212 (A)  65 - 105 mg/dL Final    Glucose 10/04/2022 505 (A)  70 - 99 mg/dL Final    BUN 10/04/2022 13  6 - 20 mg/dL Final    Creatinine 10/04/2022 1.14 (A)  0.50 - 0.90 mg/dL Final    Calcium 10/04/2022 8.8  8.6 - 10.4 mg/dL Final    Sodium 10/04/2022 133 (A)  135 - 144 mmol/L Final    Potassium 10/04/2022 5.6 (A)  3.7 - 5.3 mmol/L Final    Chloride 10/04/2022 98  98 - 107 mmol/L Final    CO2 10/04/2022 26  20 - 31 mmol/L Final    Anion Gap 10/04/2022 9  9 - 17 mmol/L Final    Est, Glom Filt Rate 10/04/2022 57 (A)  >60 mL/min/1.73m2 Final    Comment:       Effective Oct 3, 2022        These results are not intended for use in patients <25years of age. eGFR results are calculated without a race factor using the 2021 CKD-EPI equation. Careful clinical correlation is recommended, particularly when comparing to results   calculated using previous equations.   The CKD-EPI equation is less accurate in patients with extremes of muscle mass, extra-renal   metabolism of creatine, excessive creatine ingestion, or following therapy that affects   renal tubular secretion. POC Glucose 10/04/2022 388 (A)  65 - 105 mg/dL Final    POC Glucose 10/04/2022 332 (A)  65 - 105 mg/dL Final    POC Glucose 10/04/2022 337 (A)  65 - 105 mg/dL Final    POC Glucose 10/05/2022 249 (A)  65 - 105 mg/dL Final    POC Glucose 10/05/2022 384 (A)  65 - 105 mg/dL Final    POC Glucose 10/05/2022 223 (A)  65 - 105 mg/dL Final    Glucose 10/06/2022 227 (A)  70 - 99 mg/dL Final    BUN 10/06/2022 16  6 - 20 mg/dL Final    Creatinine 10/06/2022 0.89  0.50 - 0.90 mg/dL Final    Est, Glom Filt Rate 10/06/2022 >60  >60 mL/min/1.73m2 Final    Comment:       Effective Oct 3, 2022        These results are not intended for use in patients <25years of age. eGFR results are calculated without a race factor using the 2021 CKD-EPI equation. Careful clinical correlation is recommended, particularly when comparing to results   calculated using previous equations. The CKD-EPI equation is less accurate in patients with extremes of muscle mass, extra-renal   metabolism of creatine, excessive creatine ingestion, or following therapy that affects   renal tubular secretion. Calcium 10/06/2022 8.7  8.6 - 10.4 mg/dL Final    Sodium 10/06/2022 139  135 - 144 mmol/L Final    Potassium 10/06/2022 4.6  3.7 - 5.3 mmol/L Final    Chloride 10/06/2022 105  98 - 107 mmol/L Final    CO2 10/06/2022 26  20 - 31 mmol/L Final    Anion Gap 10/06/2022 8 (A)  9 - 17 mmol/L Final    Hemoglobin A1C 10/06/2022 8.0 (A)  4.0 - 6.0 % Final    Estimated Avg Glucose 10/06/2022 183  mg/dL Final    Comment: The ADA and AACC recommend providing the estimated average glucose result to permit better   patient understanding of their HBA1c result.       POC Glucose 10/05/2022 304 (A)  65 - 105 mg/dL Final    POC Glucose 10/06/2022 209 (A)  65 - 105 mg/dL Final    POC Glucose 10/06/2022 286 (A)  65 - 105 mg/dL Final    POC Glucose 10/06/2022 286 (A)  65 - 105 mg/dL Final    POC Glucose 10/06/2022 319 (A)  65 - 105 mg/dL Final    POC Glucose 10/07/2022 159 (A)  65 - 105 mg/dL Final    POC Glucose 10/07/2022 349 (A)  65 - 105 mg/dL Final    POC Glucose 10/07/2022 192 (A)  65 - 105 mg/dL Final    POC Glucose 10/07/2022 245 (A)  65 - 105 mg/dL Final    POC Glucose 10/08/2022 233 (A)  65 - 105 mg/dL Final    POC Glucose 10/08/2022 192 (A)  65 - 105 mg/dL Final    POC Glucose 10/08/2022 219 (A)  65 - 105 mg/dL Final    POC Glucose 10/09/2022 138 (A)  65 - 105 mg/dL Final    POC Glucose 10/09/2022 252 (A)  65 - 105 mg/dL Final    POC Glucose 10/09/2022 197 (A)  65 - 105 mg/dL Final    POC Glucose 10/09/2022 242 (A)  65 - 105 mg/dL Final    POC Glucose 10/10/2022 143 (A)  65 - 105 mg/dL Final    POC Glucose 10/10/2022 193 (A)  65 - 105 mg/dL Final    POC Glucose 10/10/2022 185 (A)  65 - 105 mg/dL Final    POC Glucose 10/10/2022 233 (A)  65 - 105 mg/dL Final    POC Glucose 10/11/2022 87  65 - 105 mg/dL Final    POC Glucose 10/11/2022 164 (A)  65 - 105 mg/dL Final    POC Glucose 10/11/2022 147 (A)  65 - 105 mg/dL Final    POC Glucose 10/11/2022 238 (A)  65 - 105 mg/dL Final    POC Glucose 10/12/2022 89  65 - 105 mg/dL Final    POC Glucose 10/12/2022 149 (A)  65 - 105 mg/dL Final    POC Glucose 10/12/2022 311 (A)  65 - 105 mg/dL Final         Reviewed patient's current plan of care and vital signs with nursing staff.     Labs reviewed: [x] Yes  Last EKG in EMR reviewed: [x] Yes  QTc: 477    Medications  Current Facility-Administered Medications: insulin glargine (LANTUS) injection vial 38 Units, 38 Units, SubCUTAneous, BID  insulin lispro (HUMALOG) injection vial 0-8 Units, 0-8 Units, SubCUTAneous, TID WC  insulin lispro (HUMALOG) injection vial 0-4 Units, 0-4 Units, SubCUTAneous, Nightly  metFORMIN (GLUCOPHAGE) tablet 850 mg, 850 mg, Oral, BID WC  albuterol sulfate HFA (PROVENTIL;VENTOLIN;PROAIR) 108 (90 Base) MCG/ACT inhaler 2 puff, 2 puff, Inhalation, Q4H PRN  albuterol (PROVENTIL) nebulizer solution 2.5 mg, 2.5 mg, Nebulization, Q6H PRN  rivastigmine (EXELON) 4.6 MG/24HR 1 patch, 1 patch, TransDERmal, Daily  lidocaine 4 % external patch 1 patch, 1 patch, TransDERmal, Daily  atorvastatin (LIPITOR) tablet 40 mg, 40 mg, Oral, Nightly  glucagon (rDNA) injection 1 mg, 1 mg, SubCUTAneous, PRN  glucose chewable tablet 16 g, 4 tablet, Oral, PRN  OLANZapine zydis (ZYPREXA) disintegrating tablet 15 mg, 15 mg, Oral, Nightly  pantoprazole (PROTONIX) tablet 40 mg, 40 mg, Oral, BID  pregabalin (LYRICA) capsule 75 mg, 75 mg, Oral, BID  acetaminophen (TYLENOL) tablet 650 mg, 650 mg, Oral, Q6H PRN  ibuprofen (ADVIL;MOTRIN) tablet 400 mg, 400 mg, Oral, Q6H PRN  hydrOXYzine HCl (ATARAX) tablet 50 mg, 50 mg, Oral, TID PRN  traZODone (DESYREL) tablet 50 mg, 50 mg, Oral, Nightly PRN  polyethylene glycol (GLYCOLAX) packet 17 g, 17 g, Oral, Daily PRN  aluminum & magnesium hydroxide-simethicone (MAALOX) 200-200-20 MG/5ML suspension 30 mL, 30 mL, Oral, Q6H PRN  nicotine polacrilex (NICORETTE) gum 2 mg, 2 mg, Oral, Q2H PRN  haloperidol lactate (HALDOL) injection 5 mg, 5 mg, IntraMUSCular, Q6H PRN **AND** LORazepam (ATIVAN) injection 2 mg, 2 mg, IntraMUSCular, Q6H PRN **AND** diphenhydrAMINE (BENADRYL) injection 50 mg, 50 mg, IntraMUSCular, Q6H PRN    ASSESSMENT  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 Team continues to work to establish transfer to appropriate extended care facility; insurance review pending per social work team at [address] medications as determined by attending physician  Encourage participation in groups and milieu.   Probable discharge is to be determined by MD    Electronically signed by LAILA Nguyen CNP on 10/12/2022 at 2:12 PM    **This report has been created using voice recognition software. It may contain minor errors which are inherent in voice recognition technology. **  I independently saw and evaluated the patient. I reviewed the nurse practioner's documentation above. Any additional comments or changes to the    documentation are stated below otherwise agree with assessment. The patient is frustrated with the prolonged hospital stay. She has been going to groups and interacting with her peers. She continues to have cognitive difficulties. PLAN  Medications as noted above  Attempt to develop insight  Expected Length of Stay is 2-3 days. Psycho-education conducted. Supportive Therapy conducted.   Follow-up daily while on inpatient unit    Electronically signed by Carlos Manuel Kasper MD on 10/12/22 at 8:31 PM EDT

## 2022-10-12 NOTE — PROGRESS NOTES
10/12/22 1522   Encounter Summary   Encounter Overview/Reason  Spiritual/Emotional Needs   Service Provided For: Patient   Referral/Consult From: Zac   Last Encounter  10/12/22   Complexity of Encounter Moderate   Begin Time 0130   End Time  0200   Total Time Calculated 30 min   Spiritual/Emotional needs   Type Spiritual Support   Rituals, Rites and Sacraments   Type Communion (other than [facility])   Behavioral Health    Type  Religious Group   Assessment/Intervention/Outcome   Assessment Calm   Intervention Active listening;Prayer (assurance of)/[place];Sustaining Presence/Ministry of presence   Outcome Receptive; Expressed Gratitude;Engaged in conversation

## 2022-10-12 NOTE — GROUP NOTE
Group Therapy Note    Date: 10/12/2022    Group Start Time: 1100  Group End Time: 4886  Group Topic: Cognitive Skills    [facility]    CHRISTINA Jackson        Group Therapy Note    Attendees: 9/20       Patient's Goal:  To improve problem-solving and communication skills through collaborating with peers to reach a common goal.     Notes:  Patient attended and participated in group. Patient was able to problem-solve and collaborate with peers, however was irritable and preoccupied at times. Status After Intervention:  Improved    Participation Level:  Active Listener and Interactive    Participation Quality: Appropriate, Attentive, and Supportive      Speech:  normal, at times mumbling under her breath with negative comments       Thought Process/Content: Logical, thought blocking      Affective Functioning: Blunted, brightened       Mood: irritable at time, appeared frustrated with teammates      Level of consciousness:  Alert, Preoccupied, and Inattentive      Response to Learning: Able to verbalize current knowledge/experience, Able to verbalize/acknowledge new learning, Capable of insight, and Progressing to goal      Endings: None Reported    Modes of Intervention: Support, Socialization, Problem-solving, and Activity      Discipline Responsible: Psychoeducational Specialist      Signature:  Taylor Montenegro [place]

## 2022-10-12 NOTE — PLAN OF CARE
Problem: Chronic Conditions and Co-morbidities  Goal: Patient's chronic conditions and co-morbidity symptoms are monitored and maintained or improved  Outcome: Progressing     Problem: Anxiety  Goal: Will report anxiety at manageable levels  Description: INTERVENTIONS:  1. Administer medication as ordered  2. Teach and rehearse alternative coping skills  3. Provide emotional support with 1:1 interaction with staff  Outcome: Progressing     Problem: Confusion  Goal: Confusion, delirium, dementia, or psychosis is improved or at baseline  Description: INTERVENTIONS:  1. Assess for possible contributors to thought disturbance, including medications, impaired vision or hearing, underlying metabolic abnormalities, dehydration, psychiatric diagnoses, and notify attending LIP  2. [place] high risk fall precautions, as indicated  3. Provide frequent short contacts to provide reality reorientation, refocusing and direction  4. Decrease environmental stimuli, including noise as appropriate  5. Monitor and intervene to maintain adequate nutrition, hydration, elimination, sleep and activity  6. If unable to ensure safety without constant attention obtain sitter and review sitter guidelines with assigned personnel  7. Initiate Psychosocial CNS and Spiritual Care consult, as indicated  10/11/2022 2217 by Anna Justice  Outcome: Progressing     Problem: Safety - Adult  Goal: Free from fall injury  10/11/2022 2217 by Anna Justice  Outcome: Progressing     Problem: Self Harm/Suicidality  Goal: Will have no self-injury during hospital stay  Description: INTERVENTIONS:  1. Q 30 MINUTES: Routine safety checks  2. Q SHIFT & PRN: Assess risk to determine if routine checks are adequate to maintain patient safety  10/11/2022 2217 by Anna Justice  Outcome: Progressing       Patient present in dayroom. Patient is cooperative with vitals and interview. Patient is out in dayroom and social with peers.  Patient is irritable with staff tonight and yelling at staff when her needs are not being met. Patient denies all thoughts of self-harm, suicidal ideation, homicidal ideation, or hallucinations. Patient reports increased depression and anxiety related to discharge. Support and active listening provided to patient. Patient agrees to seek out staff should thoughts of self-harm start to arise. Safety maintained with 15-minute purposeful rounds and additional checks and monitoring as needed.

## 2022-10-12 NOTE — PROGRESS NOTES
[facility] 52 Internal Medicine    Progress note             Date:   10/12/2022  Patient name:  Charanjit Osorio  Date of admission:  9/22/2022  3:40 PM  MRN:   [id number]  Account:  [de-identified]  YOB: 1965  PCP:    Tony Perez MD  Room:   [facility]  Code Status:    Full Code    Physician Requesting Consult: Cherrie Kemp MD    Reason for Consult: History and physical, medical management    Chief Complaint:     No chief complaint on file. Medical management  Dm  '  History Obtained From:     Patient, EMR, nursing staff    History of Present Illness:     72-year-old female admitted for acute psychotic state    She was initially admitted to medical unit after being found unresponsive at her home, treated for DKA with IV insulin, acute renal failure with substantial hydration, and hypovolemic shock requiring Levophed, severe hypernatremia >170, concern for GI bleed status post EGD which showed multiple duodenal ulcers treated with PPI.   Patient remained confused and delusional, CT head negative, MRI could not be done due to lack of patient cooperation      Medical history includes asthma, hypertension, stroke, type 2 diabetes, polysubstance abuse, recent ischemic stroke  Past Medical History:     Past Medical History:   Diagnosis Date    Acid reflux 5/29/2017    Acute cystitis with hematuria 10/11/2016    Acute non-recurrent maxillary sinusitis 11/28/2017    Asthma     Bipolar 1 disorder ([address] 1/4/2018    Bipolar disorder, mixed ([address]     BMI 34.0-34.9,adult 11/28/2017    Cannabis use disorder, severe, dependence ([address] 12/19/2017    Cerebrovascular accident (CVA) ([address] 6/14/2017    Chest pain 11/5/2016    Chronic renal insufficiency     Cocaine abuse ([address] 1/5/2018    COVID-19 virus RNA test result unknown     DDD (degenerative disc disease), cervical     Diabetes mellitus ([address]     Dizziness 6/13/2017    Fibromyalgia     History of stroke 9/9/2017 Homicidal ideation 11/6/2017    Hyperglycemia     Hypertension     Hypotension 1/18/2019    IDDM (insulin dependent diabetes mellitus) 12/21/2015    Lupus ([address]     Migraine     Neuropathy     Neuropathy     Polysubstance abuse ([address] 9/17/2017    Post traumatic stress disorder (PTSD)     Posttraumatic stress disorder 5/29/2017    Recurrent depression ([address] 5/29/2017    Recurrent major depressive disorder, in partial remission ([address] 11/28/2017    Screening mammogram, encounter for 11/28/2017    Severe recurrent major depression with psychotic features ([address] 12/19/2017    Severe recurrent major depression without psychotic features ([address] 12/19/2017    Stroke (cerebrum) ([address] 6/14/2017    Stroke ([address]     per chart notes    Suicidal ideation     Suicidal intent 3/10/2017    Vitamin D deficiency 11/28/2017    White matter changes 6/13/2017        Past Surgical History:     Past Surgical History:   Procedure Laterality Date    ABDOMEN SURGERY      drain tube    ABSCESS DRAINAGE      right buttock    CATARACT REMOVAL WITH IMPLANT Bilateral     CHEST TUBE INSERTION      FINGER AMPUTATION Left 03/13/2021    LEFT RING FINER AMPUTATION performed by Kaylan Mullins MD at [address] [facility] Right 10/11/2021    AMPUTATION RIGHT INDEX FINGER performed by Kaylan Mullins MD at [address] [facility] Right 1/27/2022    FOOT ABSCESS INCISION AND DRAINAGE  (PULSE LAVAGE, CULTURE SWABS) performed by Duke Em DPM at [address] [place] [address] Right 01/27/2022    FOOT ABSCESS INCISION AND DRAINAGE (PULSE LAVAGE, CULTURE SWABS) - Right    HAND SURGERY Right 09/14/2021    I&D INDEX FINGER performed by Kaylan Mullins MD at [address] [facility] Right 09/15/2021    I&D INDEX FINGER performed by Kaylan Mullins MD at [address]  2/3/2022         LASIK Bilateral     UPPER GASTROINTESTINAL ENDOSCOPY N/A 9/16/2022    EGD BIOPSY performed by Royal Scott MD at [place] [address] Medications Prior to Admission:     Prior to Admission medications    Medication Sig Start Date End Date Taking? Authorizing Provider   atorvastatin (LIPITOR) 40 MG tablet Take 1 tablet by mouth nightly 9/27/22 12/26/22 Yes Isidra Daily MD   blood glucose monitor strips Test 3 times a day & as needed for symptoms of irregular blood glucose. Dispense sufficient amount for indicated testing frequency plus additional to accommodate PRN testing needs. 9/27/22  Yes Isidra Daily MD   metFORMIN (GLUCOPHAGE) 1000 MG tablet Take 1 tablet by mouth 2 times daily (with meals) 9/27/22  Yes Isidra Daily MD   traZODone (DESYREL) 50 MG tablet Take 1 tablet by mouth nightly as needed for Sleep 9/27/22  Yes Isidra Daily MD   pantoprazole (PROTONIX) 40 MG tablet Take 1 tablet by mouth 2 times daily 9/27/22  Yes Isidra Daily MD   OLANZapine (ZYPREXA) 10 MG tablet Take 1.5 tablets by mouth nightly 9/27/22  Yes Isidra Daily MD   insulin glargine (LANTUS SOLOSTAR) 100 UNIT/ML injection pen Inject 30 Units into the skin 2 times daily 4/22/22   Romina Beach MD   fluticasone (FLONASE) 50 MCG/ACT nasal spray 1 spray by Each Nostril route daily 3/15/22   Romina Beach MD   Alcohol Swabs 70 % PADS Use 1 swab 3 times daily as needed 3/15/22   Romina Beach MD   Lancets MISC 1 each by Does not apply route 3 times daily 3/15/22   Romina Beach MD   Insulin Pen Needle ([employer] PEN NEEDLES 31G) 31G X 8 MM MISC 1 each by Does not apply route daily 3/15/22   Romina Beach MD   budesonide-formoterol (SYMBICORT) 80-4.5 MCG/ACT AERO Inhale 2 puffs into the lungs 2 times daily 3/15/22   Romina Beach MD   albuterol sulfate  (90 Base) MCG/ACT inhaler Inhale 2 puffs into the lungs every 4 hours as needed for Wheezing 3/15/22 4/15/22  Romina Beach MD   Misc.  Devices MISC 1 PAIR OF DIABETIC SHOES (1 LEFT/ 1 RIGHT)  1-3 PAIRS OF INSERTS (LEFT/ RIGHT) 2/15/22   Margarita Godwin DPM        Allergies: Bactrim [sulfamethoxazole-trimethoprim] and Adhesive tape    Social History:     Tobacco:    reports that she has never smoked. She has never used smokeless tobacco.  Alcohol:      reports that she does not currently use alcohol. Drug Use:  reports current drug use. Drugs: Marijuana (Weed) and Cocaine. Family History:     Family History   Problem Relation Age of Onset    Diabetes Mother     Stroke Mother     Diabetes Father     Diabetes Sister     Diabetes Brother     Other Son         GSW    No Known Problems Sister        Review of Systems:     Positive and Negative as described in HPI. Denies any shortness of breath or cough  Denies chest pain or palpitations  Denies diarrhea vomiting. Reports mild epigastric pain  Denies any new numbness tremors or weakness. 10 point review of systems was negative other than mentioned above    Physical Exam:     /73   Pulse 94   Temp 97.9 °F (36.6 °C)   Resp 18   Ht 5' 5\" (1.651 m)   Wt 207 lb (93.9 kg)   LMP  (LMP Unknown)   PF (!) 14 L/min   BMI 34.45 kg/m²   Temp (24hrs), Av.6 °F (36.4 °C), Min:97.3 °F (36.3 °C), Max:97.9 °F (36.6 °C)    Recent Labs     10/12/22  0452 10/12/22  0807 10/12/22  1159 10/12/22  1659   POCGLU 89 149* 311* 101       No intake or output data in the 24 hours ending 10/12/22 1754    General Appearance:  alert, well appearing, and in no acute distress  Head:  normocephalic, atraumatic. Eye: no icterus, redness, pupils equal and reactive, extraocular eye movements intact, conjunctiva clear  Ear: normal external ear, no discharge, hearing intact  Nose:  no drainage noted  Mouth: mucous membranes moist  Neck: supple, no carotid bruits, thyroid not palpable  Lungs: Bilateral equal air entry, clear to ausculation, no wheezing, rales or rhonchi, normal effort  Cardiovascular: normal rate, regular rhythm, no murmur, gallop, rub.   Abdomen: Soft, nontender, nondistended, normal bowel sounds, no hepatomegaly or Units SubCUTAneous BID    insulin lispro  0-8 Units SubCUTAneous TID WC    insulin lispro  0-4 Units SubCUTAneous Nightly    metFORMIN  850 mg Oral BID WC    rivastigmine  1 patch TransDERmal Daily    lidocaine  1 patch TransDERmal Daily    atorvastatin  40 mg Oral Nightly    OLANZapine zydis  15 mg Oral Nightly    pantoprazole  40 mg Oral BID    pregabalin  75 mg Oral BID     Continuous Infusions:   PRN Meds: albuterol sulfate HFA, albuterol, glucagon (rDNA), glucose, acetaminophen, ibuprofen, hydrOXYzine HCl, traZODone, polyethylene glycol, aluminum & magnesium hydroxide-simethicone, nicotine polacrilex, haloperidol lactate **AND** LORazepam **AND** diphenhydrAMINE    Reason for consult: General medical management     Acute psychosis - management per psych-  DKA- resolved  Type 2 DM- BG lantus, SSI- BG controlled, resume statin, metformin  Lantus 38  bid  Acute renal failure-resolved  Acute toxic encephalopathy- still encephalopathic  Hypertension- currently off meds- BP controlled  Upper GI bleed, duodenal ulcers-  c/w PPI  Recent stroke- off ASA due to recent GI bleed- no active bleed    DVT prophylaxis-not required, patient is mobile        Consultations:   Christine Rodriguez MD  10/12/2022  5:54 PM    Copy sent to Josh Seth MD    Please note that this chart was generated using voice recognition Dragon dictation software. Although every effort was made to ensure the accuracy of this automated transcription, some errors in transcription may have occurred.

## 2022-10-12 NOTE — GROUP NOTE
Group Therapy Note    Date: 10/12/2022    Group Start Time: 0900  Group End Time: 1000  Group Topic: Group Documentation    [facility]    Deep Jacobo LPN        Group Therapy Note    Goals group  Attendees: 12/20       Patient's Goal:  concentrating on decision making    Notes:      Status After Intervention:  Improved    Participation Level: Interactive and Monopolizing    Participation Quality: Attentive and Intrusive      Speech:  pressured      Thought Process/Content: Linear      Affective Functioning: Blunted      Mood: elevated      Level of consciousness:  Alert and Oriented x4      Response to Learning: Capable of insight and Progressing to goal      Endings: None Reported    Modes of Intervention: Problem-solving and Limit-setting      Discipline Responsible: Licensed Practical Nurse      Signature:  Deep Jacobo LPN

## 2022-10-13 VITALS
SYSTOLIC BLOOD PRESSURE: 125 MMHG | RESPIRATION RATE: 14 BRPM | TEMPERATURE: 97.9 F | BODY MASS INDEX: 34.49 KG/M2 | OXYGEN SATURATION: 100 % | WEIGHT: 207 LBS | HEIGHT: 65 IN | HEART RATE: 99 BPM | DIASTOLIC BLOOD PRESSURE: 82 MMHG

## 2022-10-13 LAB
GLUCOSE BLD-MCNC: 100 MG/DL (ref 65–105)
GLUCOSE BLD-MCNC: 192 MG/DL (ref 65–105)
GLUCOSE BLD-MCNC: 78 MG/DL (ref 65–105)

## 2022-10-13 PROCEDURE — 82947 ASSAY GLUCOSE BLOOD QUANT: CPT

## 2022-10-13 PROCEDURE — 99232 SBSQ HOSP IP/OBS MODERATE 35: CPT | Performed by: INTERNAL MEDICINE

## 2022-10-13 PROCEDURE — 6370000000 HC RX 637 (ALT 250 FOR IP): Performed by: PSYCHIATRY & NEUROLOGY

## 2022-10-13 PROCEDURE — 6370000000 HC RX 637 (ALT 250 FOR IP): Performed by: INTERNAL MEDICINE

## 2022-10-13 PROCEDURE — 99239 HOSP IP/OBS DSCHRG MGMT >30: CPT | Performed by: PSYCHIATRY & NEUROLOGY

## 2022-10-13 PROCEDURE — 6370000000 HC RX 637 (ALT 250 FOR IP): Performed by: NURSE PRACTITIONER

## 2022-10-13 RX ADMIN — HYDROXYZINE HYDROCHLORIDE 50 MG: 50 TABLET ORAL at 08:55

## 2022-10-13 RX ADMIN — PREGABALIN 75 MG: 75 CAPSULE ORAL at 08:54

## 2022-10-13 RX ADMIN — INSULIN GLARGINE 38 UNITS: 100 INJECTION, SOLUTION SUBCUTANEOUS at 08:57

## 2022-10-13 RX ADMIN — METFORMIN HYDROCHLORIDE 850 MG: 850 TABLET ORAL at 08:54

## 2022-10-13 RX ADMIN — ACETAMINOPHEN 650 MG: 325 TABLET, FILM COATED ORAL at 04:29

## 2022-10-13 RX ADMIN — PANTOPRAZOLE SODIUM 40 MG: 40 TABLET, DELAYED RELEASE ORAL at 08:55

## 2022-10-13 RX ADMIN — IBUPROFEN 400 MG: 400 TABLET ORAL at 08:55

## 2022-10-13 ASSESSMENT — PAIN DESCRIPTION - ORIENTATION
ORIENTATION: RIGHT
ORIENTATION: RIGHT;LEFT

## 2022-10-13 ASSESSMENT — PAIN DESCRIPTION - LOCATION
LOCATION: LEG
LOCATION: LEG

## 2022-10-13 ASSESSMENT — PAIN DESCRIPTION - DESCRIPTORS
DESCRIPTORS: ACHING;SORE
DESCRIPTORS: DULL;NAGGING

## 2022-10-13 ASSESSMENT — PAIN SCALES - GENERAL: PAINLEVEL_OUTOF10: 6

## 2022-10-13 ASSESSMENT — PAIN - FUNCTIONAL ASSESSMENT: PAIN_FUNCTIONAL_ASSESSMENT: PREVENTS OR INTERFERES SOME ACTIVE ACTIVITIES AND ADLS

## 2022-10-13 NOTE — GROUP NOTE
Group Therapy Note    Date: 10/13/2022    Group Start Time: 1100  Group End Time: 8678  Group Topic: Cognitive Skills    [facility]    CHRISTINA Schuster        Group Therapy Note    Attendees: 9/18       Patient's Goal:  To improve problem-solving and interpersonal skills through creative thinking and working with peers. Notes:  Patient attended and participated in group. Patient left senior care through group session. Status After Intervention:  Improved    Participation Level:  Active Listener, Interactive, and Minimal    Participation Quality: Appropriate, Attentive, and Resistant      Speech:  normal      Thought Process/Content: Logical      Affective Functioning: Congruent      Mood: euthymic      Level of consciousness:  Alert and Attentive while she was present for group      Response to Learning: Able to verbalize/acknowledge new learning, Able to retain information, and Progressing to goal      Endings: None Reported    Modes of Intervention: Support, Socialization, Problem-solving, and Activity      Discipline Responsible: Psychoeducational Specialist      Signature:  Levi Schuster

## 2022-10-13 NOTE — GROUP NOTE
Group Therapy Note    Date: 10/13/2022    Group Start Time: 0900  Group End Time: 0940  Group Topic: Group Documentation    [facility]    Te Roberts RN        Group Therapy Note    Attendees: 8/18       Patient's Goal:  Focus on self and work on behavior towards others    Notes:  Goals group    Status After Intervention:  Unchanged    Participation Level: Interactive    Participation Quality: Appropriate, Attentive, Sharing, and Supportive      Speech:  normal      Thought Process/Content: Logical  Linear      Affective Functioning: Congruent      Mood: anxious      Level of consciousness:  Alert, Oriented x4, and Attentive      Response to Learning: Able to verbalize current knowledge/experience, Able to verbalize/acknowledge new learning, and Able to retain information      Endings: None Reported    Modes of Intervention: Support, Socialization, and Exploration      Discipline Responsible: Behavorial Health Tech      Signature:  Te Roberts RN

## 2022-10-13 NOTE — DISCHARGE INSTR - COC
Patient Name: Allegra Benoit   :  1965  MRN:  [id number]    Admit date:  2022  Discharge date:  10/13    Code Status Order: Full Code   Advance Directives:     Admitting Physician:  Antonina Davenport MD  PCP: Lena Wells MD    Discharging Nurse: [facility] Unit/Room#: 0220/0220-01  Discharging Unit Phone Number: ***    Emergency Contact:   Extended Emergency Contact Information  Primary Emergency Contact: Ananya Palmer  Home Phone: [phone]  Relation: Child    Past Surgical History:  Past Surgical History:   Procedure Laterality Date    ABDOMEN SURGERY      drain tube    ABSCESS DRAINAGE      right buttock    CATARACT REMOVAL WITH IMPLANT Bilateral     CHEST TUBE INSERTION      FINGER AMPUTATION Left 2021    LEFT RING FINER AMPUTATION performed by Kevin Kan MD at [facility] Right 10/11/2021    AMPUTATION RIGHT INDEX FINGER performed by Kevin Kan MD at [address] [place] [address] Right 2022    FOOT ABSCESS INCISION AND DRAINAGE  (PULSE LAVAGE, CULTURE SWABS) performed by Juan Manuel Harris DPM [facility] Right 2022    FOOT ABSCESS INCISION AND DRAINAGE (PULSE LAVAGE, CULTURE SWABS) - Right    HAND SURGERY Right 2021    I&D INDEX FINGER performed by Kevin Kan MD at [address] [facility] Right 09/15/2021    I&D INDEX FINGER performed by Kevin Kan MD at [address]  2/3/2022         LASIK Bilateral     UPPER GASTROINTESTINAL ENDOSCOPY N/A 2022    EGD BIOPSY performed by Bettina Haskins MD at [facility]       Immunization History:   Immunization History   Administered Date(s) Administered    COVID-19, PFIZER PURPLE top, DILUTE for use, (age 15 y+), 30mcg/0.3mL 10/05/2021, 2021    Tdap (Boostrix, Adacel) 2017, 2020, 2021, 2022       Active Problems:  Patient Active Problem List   Diagnosis Code    IDDM (insulin dependent diabetes mellitus) [id number]    Lupus ([facility] [facility] M32.9    Post traumatic stress disorder (PTSD) F43.10    Acute cystitis with hematuria N30.01    Hyperglycemia due to diabetes mellitus ([facility] E11.65    Suicidal ideation R45.851    Arthritis M19.90    Chronic back pain M54.9, G89.29    Acid reflux K21.9    History of stroke Z86.73    Polysubstance abuse ([facility]) F19.10    Cocaine use disorder ([facility]) F14.10    Chest pain R07.9    Acute non-recurrent maxillary sinusitis J01.00    Acute respiratory failure with hypercapnia ([facility]) J96.02    Alcohol use disorder, severe, dependence ([facility]) F10.20    Asthma exacerbation attacks J45.901    Bilateral edema of lower extremity R60.0    Bipolar 1 disorder, depressed, severe ([facility]) F31.4    BMI 34.0-34.9,adult Z68.34    Cannabis use disorder, severe, dependence ([facility]) F12.20    Cocaine use disorder, severe, dependence ([facility]) F14.20    Dizziness R42    Dyslipidemia E78.5    Family history of colon cancer Z80.0    History of lupus [id number]    Homicidal ideation R45.850    Hypotension I95.9    Neuropathy G62.9    Absolute anemia D64.9    Recurrent depression ([facility]) F33.9    Recurrent major depressive disorder, in partial remission ([facility] F33.41    Major depressive disorder, recurrent, severe with psychotic features ([facility] [address] F33.3    Severe recurrent major depression without psychotic features ([facility] [address] [facility] F33.2    Acute GI bleeding K92.2    Vitamin D deficiency E55.9    Acute encephalopathy G93.40    Gastroesophageal reflux disease K21.9    Brain lesion G93.9    Rib lesion M89.9    Diabetes mellitus type 2 in obese ([facility]) E11.69, E66.9    YAEL (generalized anxiety disorder) F41.1    Posttraumatic stress disorder F43.10    Suicidal intent R45.851    Leg weakness, bilateral R29.898    Poorly controlled diabetes mellitus ([facility]) E11.65    Felon of finger L03.019    Osteomyelitis ([facility]) M86.9    Recurrent falls R29.6    Seasonal allergic rhinitis J30.2    Fibromyalgia M79.7    Tenosynovitis of finger M65.9    Open wound of right index finger S61.200A    Adverse effect of COVID-19 vaccine T50. B95A    Wound dehiscence T81.30XA    Amputation finger S68.119A    Abscess of right index finger L02.511    Type 2 diabetes mellitus without complication, without long-term current use of insulin ([facility]) E11.9    Major depression F32.9    Right foot infection L08.9    Cellulitis and abscess of toe of right foot L03.031, L02.611    Abscess of right foot L02.611    Bilateral cellulitis of lower leg related to related to  uncontrolled diabetes L03.116, L03.115    Bipolar disorder, current episode mixed, unspecified ([facility]) F31.60    Right foot ulcer, with fat layer exposed ([facility] L97.512    Ulcer of left foot, with fat layer exposed ([facility]) L97.522    CRP elevated R79.82    Status post incision and drainage Z98.890    Intractable headache R51.9    Type 2 diabetes mellitus with diabetic autonomic neuropathy, with long-term current use of insulin ([facility]) E11.43, Z79.4    Somnolence R40.0    Cerebral infarction due to embolism of left middle cerebral artery ([facility]) I63.412    DKA, type 1, not at goal [facility]) E10.10    DKA, type 2, not at goal [facility]) E11.10    Uremic encephalopathy G93.49, N19    Unspecified mood (affective) disorder ([facility]) F39    Coffee ground emesis K92.0    Elevated LFTs R79.89    Diabetic ketoacidosis with coma associated with type 1 diabetes mellitus ([address] E10.11    Unspecified psychosis not due to a substance or known physiological condition ([facility]) F29    Fentanyl use disorder, mild ([address] F11.10    Unsp psychosis not due to a substance or known physiol cond ([address] F29    Other hyperlipidemia E78.49       Isolation/Infection:   Isolation            No Isolation          Patient Infection Status       Infection Onset Added Last Indicated Last Indicated By Review Planned Expiration Resolved Resolved By    MRSA 02/24/21 02/27/21 02/24/21 Culture, Anaerobic and Aerobic        Finger 2/2021    Resolved    COVID-19 (Rule Out) 03/13/21 03/13/21 03/13/21 COVID-19, Rapid (Ordered)   03/13/21 Rule-Out Test Resulted            Nurse Assessment:  Last Vital Signs: /82   Pulse 99   Temp 97.9 °F (36.6 °C) (Oral)   Resp 14   Ht 5' 5\" (1.651 m)   Wt 207 lb (93.9 kg)   LMP  (LMP Unknown)   SpO2 100%   PF (!) 14 L/min   BMI 34.45 kg/m²     Last documented pain score (0-10 scale): Pain Level: 6  Last Weight:   Wt Readings from Last 1 Encounters:   09/22/22 207 lb (93.9 kg)     Mental Status:  {IP PT MENTAL STATUS:92938}    IV Access:  { MINDA IV ACCESS:782476111}    Nursing Mobility/ADLs:  Walking   {CHP DME NNKO:785770742}  Transfer  {CHP DME EQEX:202131894}  Bathing  {CHP DME GMWE:558205954}  Dressing  {CHP DME DLAP:151625570}  Toileting  {CHP DME MMKW:582481807}  Feeding  {CHP DME AJKI:530378427}  Med Admin  {CHP DME DFQX:521470559}  Med Delivery   { MINDA MED Delivery:533693892}    Wound Care Documentation and Therapy:  Wound 09/08/22 Abdomen fold (Active)   Wound Image   09/09/22 1704   Wound Etiology Diabetic 09/16/22 1915   Dressing Status Other (Comment) 09/16/22 1915   Wound Cleansed Not Cleansed 09/13/22 0815   Dressing/Treatment Triad hydro/zinc oxide-based hydrophilic paste 35/94/94 1439   Wound Assessment Other (Comment) 09/21/22 0837   Drainage Amount None 09/16/22 1915   Odor None 09/16/22 1915   Tamar-wound Assessment Warm 09/12/22 1600   Margins Defined edges 09/12/22 1600   Wound Thickness Description not for Pressure Injury Partial thickness 09/12/22 1600   Number of days: 34       Wound 09/08/22 Buttocks (Active)   Wound Image   09/09/22 1704   Wound Etiology Other 09/16/22 1915   Dressing Status Other (Comment) 09/16/22 1915   Wound Cleansed Not Cleansed 09/13/22 0815   Dressing/Treatment Triad hydro/zinc oxide-based hydrophilic paste;Open to air 09/14/22 1000   Wound Assessment Other (Comment) 09/21/22 0837   Drainage Amount None 09/16/22 1915   Odor None 09/16/22 1915   Tamar-wound Assessment Fragile 09/13/22 0815   Margins Defined edges 09/13/22 1945   Wound Thickness Description not for Pressure Injury Partial thickness 09/12/22 1600   Number of days: 34       Wound 09/09/22 Breast Right (Active)   Wound Image   09/09/22 1704   Wound Etiology Other 09/16/22 1915   Dressing Status Other (Comment) 09/16/22 1915   Wound Cleansed Soap and water 09/12/22 1600   Dressing/Treatment Triad hydro/zinc oxide-based hydrophilic paste;Open to air 09/13/22 1235   Wound Assessment Other (Comment) 09/21/22 0837   Drainage Amount None 09/16/22 1915   Odor None 09/16/22 1915   Tamar-wound Assessment Fragile 09/12/22 1600   Margins Attached edges 09/12/22 1600   Wound Thickness Description not for Pressure Injury Partial thickness 09/12/22 1600   Number of days: 33       Wound 09/09/22 Breast Left (Active)   Wound Image   09/09/22 1704   Wound Etiology Other 09/17/22 0815   Dressing Status Other (Comment) 09/17/22 0815   Wound Cleansed Soap and water 09/12/22 1600   Dressing/Treatment Triad hydro/zinc oxide-based hydrophilic paste;Open to air 09/13/22 1235   Wound Assessment Other (Comment) 09/21/22 0837   Drainage Amount None 09/17/22 0815   Odor None 09/17/22 0815   Tamar-wound Assessment Fragile 09/11/22 0730   Margins Attached edges 09/11/22 0730   Wound Thickness Description not for Pressure Injury Partial thickness 09/11/22 0730   Number of days: 33        Elimination:  Continence: Bowel: {YES / JE:81879}  Bladder: {YES / YN:47889}  Urinary Catheter: {Urinary Catheter:736378743}   Colostomy/Ileostomy/Ileal Conduit: {YES / YQ:09116}       Date of Last BM: ***  No intake or output data in the 24 hours ending 10/13/22 1035  No intake/output data recorded.     Safety Concerns:     [employer] Safety Concerns:785406020}    Impairments/Disabilities:      [employer] Impairments/Disabilities:319493445}    Nutrition Therapy:  Current Nutrition Therapy:   [employer] Diet List:279099265}    Routes of Feeding: {CHP DME Other Feedings:620154172}  Liquids: {Slp liquid thickness:57461}  Daily Fluid Restriction: {CHP DME Yes amt example:999385737}  Last Modified Barium Swallow with Video (Video Swallowing Test): {Done Not Done DDWM:076900164}    Treatments at the Time of Hospital Discharge:   Respiratory Treatments: ***  Oxygen Therapy:  {Therapy; copd oxygen:79638}  Ventilator:    {MH CC Vent NCNP:863314624}    Rehab Therapies: {THERAPEUTIC INTERVENTION:4773536955}  Weight Bearing Status/Restrictions: { CC Weight Bearin}  Other Medical Equipment (for information only, NOT a DME order):  {EQUIPMENT:077445897}  Other Treatments: ***    Patient's personal belongings (please select all that are sent with patient):  {CHP DME Belongings:720490884}    RN SIGNATURE:  {Esignature:145676101}    CASE MANAGEMENT/SOCIAL WORK SECTION    Inpatient Status Date: ***    Readmission Risk Assessment Score:  Readmission Risk              Risk of Unplanned Readmission:  40           Discharging to Facility/ Agency   Name:   Address:  Phone:  Fax:    Dialysis Facility (if applicable)   Name:  Address:  Dialysis Schedule:  Phone:  Fax:    / signature: {Esignature:554119294}    PHYSICIAN SECTION    Prognosis: {Prognosis:2447098106}    Condition at Discharge: [address] [place] [address] Patient Condition:996488685}    Rehab Potential (if transferring to Rehab): {Prognosis:0343657367}    Recommended Labs or Other Treatments After Discharge: ***    Physician Certification: I certify the above information and transfer of Janey Daniels  is necessary for the continuing treatment of the diagnosis listed and that she requires {Admit to Appropriate Level of Care:03379} for {GREATER/LESS:875356214} 30 days.      Update Admission H&P: {CHP DME Changes in DLDXE:652137452}    PHYSICIAN SIGNATURE:  {Esignature:144995660}

## 2022-10-13 NOTE — BH NOTE
Patient refused to sign or review AVS. [facility] picked up for transport to [facility]. Report called in to Jack Hernandez.

## 2022-10-13 NOTE — CARE COORDINATION
Name: Gladys Staton    : 1965    Discharge Date: 10/13/22    Primary Auth/Cert #: [id number]        Discharge Medications:      Medication List        START taking these medications      OLANZapine 10 MG tablet  Commonly known as: ZyPREXA  Take 1.5 tablets by mouth nightly  Notes to patient: Clear thoughts     pantoprazole 40 MG tablet  Commonly known as: PROTONIX  Take 1 tablet by mouth 2 times daily  Notes to patient: Acid reflux            CHANGE how you take these medications      blood glucose test strips  Test 3 times a day & as needed for symptoms of irregular blood glucose. Dispense sufficient amount for indicated testing frequency plus additional to accommodate PRN testing needs. What changed: Another medication with the same name was removed. Continue taking this medication, and follow the directions you see here.   Notes to patient: Diabetes management     traZODone 50 MG tablet  Commonly known as: DESYREL  Take 1 tablet by mouth nightly as needed for Sleep  What changed:   medication strength  how much to take  when to take this  reasons to take this  Notes to patient: Sleep            CONTINUE taking these medications      albuterol sulfate  (90 Base) MCG/ACT inhaler  Commonly known as: PROVENTIL;VENTOLIN;PROAIR  Inhale 2 puffs into the lungs every 4 hours as needed for Wheezing  Notes to patient: Wheezing     Alcohol Swabs 70 % Pads  Use 1 swab 3 times daily as needed     atorvastatin 40 MG tablet  Commonly known as: LIPITOR  Take 1 tablet by mouth nightly  Notes to patient: Cholesterol     budesonide-formoterol 80-4.5 MCG/ACT Aero  Commonly known as: Symbicort  Inhale 2 puffs into the lungs 2 times daily  Notes to patient: Airway management     fluticasone 50 MCG/ACT nasal spray  Commonly known as: FLONASE  1 spray by Each Nostril route daily  Notes to patient: Clear sinuses     [employer] Pen Needles 31G 31G X 8 MM Misc  Generic drug: Insulin Pen Needle  1 each by Does not apply route daily  Notes to patient: Diabetes management     Lancets Misc  1 each by Does not apply route 3 times daily  Notes to patient: Diabetes management     Lantus SoloStar 100 UNIT/ML injection pen  Generic drug: insulin glargine  Inject 30 Units into the skin 2 times daily  Notes to patient: Long acting insulin to mange diabetes     metFORMIN 1000 MG tablet  Commonly known as: GLUCOPHAGE  Take 1 tablet by mouth 2 times daily (with meals)  Notes to patient: Diabetes     Misc.  Devices Misc  1 PAIR OF DIABETIC SHOES (1 LEFT/ 1 RIGHT)  1-3 PAIRS OF INSERTS (LEFT/ RIGHT)            STOP taking these medications      acetaminophen 500 MG tablet  Commonly known as: TYLENOL     dicyclomine 10 MG capsule  Commonly known as: Bentyl     mirtazapine 7.5 MG tablet  Commonly known as: REMERON     mupirocin 2 % ointment  Commonly known as: BACTROBAN     venlafaxine 150 MG extended release capsule  Commonly known as: EFFEXOR XR               Where to Get Your Medications        These medications [facility] [address] [address] [facility] [address]      Phone: [phone]   atorvastatin 40 MG tablet  blood glucose test strips  metFORMIN 1000 MG tablet  OLANZapine 10 MG tablet  pantoprazole 40 MG tablet  traZODone 50 MG tablet         Follow Up Appointment: Please discharge PT to:  [facility] and [address] [place] [address] [address], [address] [place] [address] [phone]  fax [phone]  Follow up on 10/13/2022  Eliezer Macias will be transporting PT to the [address] [place] [address]

## 2022-10-13 NOTE — PROGRESS NOTES
[place] [id number] [facility]    Progress note             Date:   10/13/2022  Patient name:  Vicki Live  Date of admission:  9/22/2022  3:40 PM  MRN:   [id number]  Account:  [de-identified]  YOB: 1965  PCP:    Moustapha Reinoso MD  Room:   [facility]0220-  Code Status:    Full Code    Physician Requesting Consult: No att. providers found    Reason for Consult: History and physical, medical management    Chief Complaint:     No chief complaint on file. Medical management  Dm  '  History Obtained From:     Patient, EMR, nursing staff    History of Present Illness:     17-year-old female admitted for acute psychotic state    She was initially admitted to medical unit after being found unresponsive at her home, treated for DKA with IV insulin, acute renal failure with substantial hydration, and hypovolemic shock requiring Levophed, severe hypernatremia >170, concern for GI bleed status post EGD which showed multiple duodenal ulcers treated with PPI.   Patient remained confused and delusional, CT head negative, MRI could not be done due to lack of patient cooperation      Medical history includes asthma, hypertension, stroke, type 2 diabetes, polysubstance abuse, recent ischemic stroke  Past Medical History:     Past Medical History:   Diagnosis Date    Acid reflux 5/29/2017    Acute cystitis with hematuria 10/11/2016    Acute non-recurrent maxillary sinusitis 11/28/2017    Asthma     Bipolar 1 disorder ([address] 1/4/2018    Bipolar disorder, mixed ([address]     BMI 34.0-34.9,adult 11/28/2017    Cannabis use disorder, severe, dependence ([address] 12/19/2017    Cerebrovascular accident (CVA) ([address] 6/14/2017    Chest pain 11/5/2016    Chronic renal insufficiency     Cocaine abuse ([address] 1/5/2018    COVID-19 virus RNA test result unknown     DDD (degenerative disc disease), cervical     Diabetes mellitus ([address]     Dizziness 6/13/2017    Fibromyalgia     History of stroke 9/9/2017 Homicidal ideation 11/6/2017    Hyperglycemia     Hypertension     Hypotension 1/18/2019    IDDM (insulin dependent diabetes mellitus) 12/21/2015    Lupus ([address]     Migraine     Neuropathy     Neuropathy     Polysubstance abuse ([address] 9/17/2017    Post traumatic stress disorder (PTSD)     Posttraumatic stress disorder 5/29/2017    Recurrent depression ([address] 5/29/2017    Recurrent major depressive disorder, in partial remission ([address] 11/28/2017    Screening mammogram, encounter for 11/28/2017    Severe recurrent major depression with psychotic features ([address] 12/19/2017    Severe recurrent major depression without psychotic features ([address] 12/19/2017    Stroke (cerebrum) ([address] 6/14/2017    Stroke ([address]     per chart notes    Suicidal ideation     Suicidal intent 3/10/2017    Vitamin D deficiency 11/28/2017    White matter changes 6/13/2017        Past Surgical History:     Past Surgical History:   Procedure Laterality Date    ABDOMEN SURGERY      drain tube    ABSCESS DRAINAGE      right buttock    CATARACT REMOVAL WITH IMPLANT Bilateral     CHEST TUBE INSERTION      FINGER AMPUTATION Left 03/13/2021    LEFT RING FINER AMPUTATION performed by Marcella Bennett MD at [address] Right 10/11/2021    AMPUTATION RIGHT INDEX FINGER performed by Marcella Bennett MD at [address] Right 1/27/2022    FOOT ABSCESS INCISION AND DRAINAGE  (PULSE LAVAGE, CULTURE SWABS) performed by Earl Razo DPM at [address] [facility] Right 01/27/2022    FOOT ABSCESS INCISION AND DRAINAGE (PULSE LAVAGE, CULTURE SWABS) - Right    HAND SURGERY Right 09/14/2021    I&D INDEX FINGER performed by Marcella Bennett MD at [address] [facility] [address] Right 09/15/2021    I&D INDEX FINGER performed by Marcella Bennett MD at [address]  2/3/2022         LASIK Bilateral     UPPER GASTROINTESTINAL ENDOSCOPY N/A 9/16/2022    EGD BIOPSY performed by Bryce Hammond MD at [place] [address] Medications Prior to Admission:     Prior to Admission medications    Medication Sig Start Date End Date Taking? Authorizing Provider   atorvastatin (LIPITOR) 40 MG tablet Take 1 tablet by mouth nightly 9/27/22 12/26/22 Yes Isidra Daily MD   blood glucose monitor strips Test 3 times a day & as needed for symptoms of irregular blood glucose. Dispense sufficient amount for indicated testing frequency plus additional to accommodate PRN testing needs. 9/27/22  Yes Isidra Daily MD   metFORMIN (GLUCOPHAGE) 1000 MG tablet Take 1 tablet by mouth 2 times daily (with meals) 9/27/22  Yes Isidra Daily MD   traZODone (DESYREL) 50 MG tablet Take 1 tablet by mouth nightly as needed for Sleep 9/27/22  Yes Isidra Daily MD   pantoprazole (PROTONIX) 40 MG tablet Take 1 tablet by mouth 2 times daily 9/27/22  Yes Isidra Daily MD   OLANZapine (ZYPREXA) 10 MG tablet Take 1.5 tablets by mouth nightly 9/27/22  Yes Isidra Daily MD   insulin glargine (LANTUS SOLOSTAR) 100 UNIT/ML injection pen Inject 30 Units into the skin 2 times daily 4/22/22   Romina Beach MD   fluticasone (FLONASE) 50 MCG/ACT nasal spray 1 spray by Each Nostril route daily 3/15/22   Romina Beach MD   Alcohol Swabs 70 % PADS Use 1 swab 3 times daily as needed 3/15/22   Romina Beach MD   Lancets MISC 1 each by Does not apply route 3 times daily 3/15/22   Romina Beach MD   Insulin Pen Needle ([employer] PEN NEEDLES 31G) 31G X 8 MM MISC 1 each by Does not apply route daily 3/15/22   Romina Beach MD   budesonide-formoterol (SYMBICORT) 80-4.5 MCG/ACT AERO Inhale 2 puffs into the lungs 2 times daily 3/15/22   Romina Beach MD   albuterol sulfate  (90 Base) MCG/ACT inhaler Inhale 2 puffs into the lungs every 4 hours as needed for Wheezing 3/15/22 4/15/22  Romina Beach MD   Misc.  Devices MISC 1 PAIR OF DIABETIC SHOES (1 LEFT/ 1 RIGHT)  1-3 PAIRS OF INSERTS (LEFT/ RIGHT) 2/15/22   Margarita Godwin DPM        Allergies: Bactrim [sulfamethoxazole-trimethoprim] and Adhesive tape    Social History:     Tobacco:    reports that she has never smoked. She has never used smokeless tobacco.  Alcohol:      reports that she does not currently use alcohol. Drug Use:  reports current drug use. Drugs: Marijuana (Weed) and Cocaine. Family History:     Family History   Problem Relation Age of Onset    Diabetes Mother     Stroke Mother     Diabetes Father     Diabetes Sister     Diabetes Brother     Other Son         GSW    No Known Problems Sister        Review of Systems:     Positive and Negative as described in HPI. Denies any shortness of breath or cough  Denies chest pain or palpitations  Denies diarrhea vomiting. Reports mild epigastric pain  Denies any new numbness tremors or weakness. 10 point review of systems was negative other than mentioned above    Physical Exam:     /82   Pulse 99   Temp 97.9 °F (36.6 °C) (Oral)   Resp 14   Ht 5' 5\" (1.651 m)   Wt 207 lb (93.9 kg)   LMP  (LMP Unknown)   SpO2 100%   PF (!) 14 L/min   BMI 34.45 kg/m²   Temp (24hrs), Av.9 °F (36.6 °C), Min:97.9 °F (36.6 °C), Max:97.9 °F (36.6 °C)    Recent Labs     10/12/22  2030 10/13/22  0444 10/13/22  0727 10/13/22  1120   POCGLU 153* 78 100 192*       No intake or output data in the 24 hours ending 10/13/22 1648    General Appearance:  alert, well appearing, and in no acute distress  Head:  normocephalic, atraumatic. Eye: no icterus, redness, pupils equal and reactive, extraocular eye movements intact, conjunctiva clear  Ear: normal external ear, no discharge, hearing intact  Nose:  no drainage noted  Mouth: mucous membranes moist  Neck: supple, no carotid bruits, thyroid not palpable  Lungs: Bilateral equal air entry, clear to ausculation, no wheezing, rales or rhonchi, normal effort  Cardiovascular: normal rate, regular rhythm, no murmur, gallop, rub.   Abdomen: Soft, nontender, nondistended, normal bowel sounds, no hepatomegaly or splenomegaly  Neurologic: difficult to assess, limited pt co-operation. Alert, oriented X 1-2, mobile, power equal all 4 limibs. Normal speech but abnormal content, confabulation present. No focal neurology otherwise  Skin: No gross lesions, rashes, bruising or bleeding on exposed skin area  Extremities:  No joint swelling, no pedal edema or calf pain with palpation      Investigations:      Laboratory Testing:  Recent Results (from the past 24 hour(s))   POC Glucose Fingerstick    Collection Time: 10/12/22  4:59 PM   Result Value Ref Range    POC Glucose 101 65 - 105 mg/dL   POC Glucose Fingerstick    Collection Time: 10/12/22  8:30 PM   Result Value Ref Range    POC Glucose 153 (H) 65 - 105 mg/dL   POC Glucose Fingerstick    Collection Time: 10/13/22  4:44 AM   Result Value Ref Range    POC Glucose 78 65 - 105 mg/dL   POC Glucose Fingerstick    Collection Time: 10/13/22  7:27 AM   Result Value Ref Range    POC Glucose 100 65 - 105 mg/dL   POC Glucose Fingerstick    Collection Time: 10/13/22 11:20 AM   Result Value Ref Range    POC Glucose 192 (H) 65 - 105 mg/dL       Imaging/Diagonstics:  Recent data reviewed    Assessment :      Primary Problem  Unspecified psychosis not due to a substance or known physiological condition [facility])    Active Hospital Problems    Diagnosis Date Noted    Other hyperlipidemia [E78.49] 10/11/2022     Priority: Medium    Unsp psychosis not due to a substance or known physiol cond ([address] [F29] 09/28/2022     Priority: Medium    Fentanyl use disorder, mild ([address] [F11.10] 09/23/2022     Priority: Medium    Unspecified psychosis not due to a substance or known physiological condition ([address] [F29] 09/22/2022     Priority: Medium    Cocaine use disorder ([address] [F14.10] 01/05/2018       Plan:     Medications: Allergies:     Allergies   Allergen Reactions    Bactrim [Sulfamethoxazole-Trimethoprim] Swelling    Adhesive Tape Rash       Current Meds:   Scheduled Meds:    insulin glargine  38 Units SubCUTAneous BID    insulin lispro  0-8 Units SubCUTAneous TID WC    insulin lispro  0-4 Units SubCUTAneous Nightly    metFORMIN  850 mg Oral BID WC    rivastigmine  1 patch TransDERmal Daily    lidocaine  1 patch TransDERmal Daily    atorvastatin  40 mg Oral Nightly    OLANZapine zydis  15 mg Oral Nightly    pantoprazole  40 mg Oral BID    pregabalin  75 mg Oral BID     Continuous Infusions:   PRN Meds: albuterol sulfate HFA, albuterol, glucagon (rDNA), glucose, acetaminophen, ibuprofen, hydrOXYzine HCl, traZODone, polyethylene glycol, aluminum & magnesium hydroxide-simethicone, nicotine polacrilex, haloperidol lactate **AND** LORazepam **AND** diphenhydrAMINE    Reason for consult: General medical management     Acute psychosis - management per psych-  DKA- resolved  Type 2 DM- BG lantus, SSI- BG controlled, resume statin, metformin  Lantus 38  bid  Acute renal failure-resolved  Acute toxic encephalopathy- still encephalopathic  Hypertension- currently off meds- BP controlled  Upper GI bleed, duodenal ulcers-  c/w PPI  Recent stroke- off ASA due to recent GI bleed- no active bleed    DVT prophylaxis-not required, patient is mobile        Consultations:   Christine Rodriguez MD  10/13/2022  4:48 PM    Copy sent to Jessica Dudley MD    Please note that this chart was generated using voice recognition Dragon dictation software. Although every effort was made to ensure the accuracy of this automated transcription, some errors in transcription may have occurred.

## 2022-10-13 NOTE — PROGRESS NOTES
Physical Therapy        Physical Therapy Cancel Note      DATE: 10/13/2022    NAME: Reva Benson  MRN: [id number]   : 1965      Patient not seen this date for Physical Therapy due to: Other: Pt declines therapy at this time d/t wanting to finish eating breakfast. Will check back later time permitting.        Electronically signed by Arvin Casas PTA on 10/13/2022 at 11:33 AM

## 2022-10-13 NOTE — PROGRESS NOTES
10/13/22 1503   Encounter Summary   Encounter Overview/Reason  Spiritual/Emotional Needs   Service Provided For: Patient   Referral/Consult From: Zac   Last Encounter  10/13/22   Complexity of Encounter Moderate   Begin Time 1415   End Time  1430   Total Time Calculated 15 min   Spiritual/Emotional needs   Type Spiritual Support   Behavioral Health    Type  Other (comment)  (Visit)   Assessment/Intervention/Outcome   Assessment Anxious; Compromised coping;Concerns with suffering; Despair;Fearful;Powerlessness;Sad;Stress overload;Tearful;Decisional conflict   Intervention Active listening;Discussed belief system/Protestant practices/henry;Discussed meaning/purpose;Discussed relationship with God;Explored/Affirmed feelings, thoughts, concerns;Nurtured Hope;Prayer (assurance of)/[place];Sustaining Presence/Ministry of presence   Outcome Acceptance; Connection/Belonging;Engaged in conversation;Expressed feelings, needs, and concerns;Peace; Receptive;Venting emotion;Grieving;Expressed Gratitude; Less anxious, Less agitated

## 2022-10-13 NOTE — DISCHARGE SUMMARY
DISCHARGE SUMMARY      Patient ID:  Gladys Staton  [id number]  44 y.o.  1965    Admit date: 9/22/2022    Discharge date and time: 10/13/2022    Disposition: Home      Admitting Physician: Maya Arndt MD     Discharge Physician: Dr Marianne Perez MD    Admission Diagnoses: Psychosis, unspecified psychosis type ([facility] [F29]  Unsp psychosis not due to a substance or known physiol cond ([facility] [F29]    Admission Condition: poor    Discharged Condition: stable    Admission Circumstance: Gladys Staton is a 56 y.o. female who has a past medical history of diabetes mellitus type 2, cerebral infarct, DKA, osteoarthritis, GERD, asthma, Warnicke's encephalopathy, fibromyalgia, lupus, migraine, neuropathy, hypertension, polysubstance use, alcohol use disorder, severe dependence, PTSD, depression with and without psychosis, and anxiety. Patient admitted to [facility] from [address] [place] [address] following admission on 9/8 with altered mental status. Authorities had been contacted by her family for a well check and EMS found the patient to be responsive to pain only. She was confused and had limited ability to respond to questions. Patient has previous admissions to [facility] but nothing since 2018. Patient was seen for initial evaluation today. Nursing staff report patient refused to sign paperwork upon admission and has been irritable and agitated on the unit at times. She has maintained medication adherence. She was resting in bed on approach. And completely covered by blanket. She responded to verbal stimuli but refused to make eye contact with writer. She was encouraged to sit up to engage in conversation, however she refused. She presented as withdrawn and was evasive with answers to assessment questions. She was irritable and stated \"ma'am I just do not feel like doing this right now can you come back tomorrow? \". She was irritable throughout much of attempted conversation. She was dismissive of writer.  She did talk with writer long enough to endorse ongoing symptoms of depression but refuses to identify what she has been experiencing. She is minimizing of current symptoms. Patient was asked to identify perceptual disturbances and she would say \"I am just tired I do not want to talk\". Patient is denying suicidal and homicidal ideation today. She also denies auditory and visual hallucinations but has recently been observed to be responding to internal stimuli. Per EMR patient has a previous diagnosis of bipolar disorder and PTSD. Writer attempts to screen patient for these disorders and that she is noncompliant at present. Patient refuses to participate in assessment any further. Due to patient's refusal to fully participate in assessment, much of documentation was completed via use of EMR. Patient has yet to demonstrate sustained stability and warrants hospitalization for safety and stabilization.       PAST MEDICAL/PSYCHIATRIC HISTORY:   Past Medical History:   Diagnosis Date    Acid reflux 5/29/2017    Acute cystitis with hematuria 10/11/2016    Acute non-recurrent maxillary sinusitis 11/28/2017    Asthma     Bipolar 1 disorder ([address] 1/4/2018    Bipolar disorder, mixed ([address]     BMI 34.0-34.9,adult 11/28/2017    Cannabis use disorder, severe, dependence ([address] 12/19/2017    Cerebrovascular accident (CVA) ([address] 6/14/2017    Chest pain 11/5/2016    Chronic renal insufficiency     Cocaine abuse ([address] 1/5/2018    COVID-19 virus RNA test result unknown     DDD (degenerative disc disease), cervical     Diabetes mellitus ([address]     Dizziness 6/13/2017    Fibromyalgia     History of stroke 9/9/2017    Homicidal ideation 11/6/2017    Hyperglycemia     Hypertension     Hypotension 1/18/2019    IDDM (insulin dependent diabetes mellitus) 12/21/2015    Lupus ([address]     Migraine     Neuropathy     Neuropathy     Polysubstance abuse ([address] 9/17/2017    Post traumatic stress disorder (PTSD)     Posttraumatic stress disorder 5/29/2017    Recurrent depression ([facility] 5/29/2017    Recurrent major depressive disorder, in partial remission ([facility] 11/28/2017    Screening mammogram, encounter for 11/28/2017    Severe recurrent major depression with psychotic features ([facility] 12/19/2017    Severe recurrent major depression without psychotic features ([facility] 12/19/2017    Stroke (cerebrum) ([facility] 6/14/2017    Stroke ([facility]     per chart notes    Suicidal ideation     Suicidal intent 3/10/2017    Vitamin D deficiency 11/28/2017    White matter changes 6/13/2017       FAMILY/SOCIAL HISTORY:  Family History   Problem Relation Age of Onset    Diabetes Mother     Stroke Mother     Diabetes Father     Diabetes Sister     Diabetes Brother     Other Son         GSW    No Known Problems Sister      Social History     Socioeconomic History    Marital status: Legally      Spouse name: Not on file    Number of children: Not on file    Years of education: Not on file    Highest education level: Not on file   Occupational History    Not on file   Tobacco Use    Smoking status: Never    Smokeless tobacco: Never   Vaping Use    Vaping Use: Never used   Substance and Sexual Activity    Alcohol use: Not Currently    Drug use: Yes     Types: Marijuana ([facility]), Cocaine     Comment: + Cocaine 2/2021 also, see Care Everywhere UDS    Sexual activity: Not Currently     Partners: Male   Other Topics Concern    Not on file   Social History Narrative    Not on file     Social Determinants of Health     Financial Resource Strain: Not on file   Food Insecurity: Not on file   Transportation Needs: Not on file   Physical Activity: Not on file   Stress: Not on file   Social Connections: Unknown    Frequency of Communication with Friends and Family: Once a week    Frequency of Social Gatherings with Friends and Family: Not on file    Attends Confucianist Services: Not on file    Active Member of Clubs or Organizations: No    Attends Club or Organization Meetings: Never    Marital Status:   Intimate Partner Violence: Not on file   Housing Stability: Not on file       MEDICATIONS:    Current Facility-Administered Medications:     insulin glargine (LANTUS) injection vial 38 Units, 38 Units, SubCUTAneous, BID, Luanne West MD, 38 Units at 10/13/22 0857    insulin lispro (HUMALOG) injection vial 0-8 Units, 0-8 Units, SubCUTAneous, TID WC, Luanne West MD, 6 Units at 10/12/22 1227    insulin lispro (HUMALOG) injection vial 0-4 Units, 0-4 Units, SubCUTAneous, Nightly, Luanne West MD    metFORMIN (GLUCOPHAGE) tablet 850 mg, 850 mg, Oral, BID WC, Luanne West MD, 850 mg at 10/13/22 0854    albuterol sulfate HFA (PROVENTIL;VENTOLIN;PROAIR) 108 (90 Base) MCG/ACT inhaler 2 puff, 2 puff, Inhalation, Q4H PRN, Luanne West MD, 2 puff at 10/06/22 2234    albuterol (PROVENTIL) nebulizer solution 2.5 mg, 2.5 mg, Nebulization, Q6H PRN, Shadi Bender MD    rivastigmine (EXELON) 4.6 MG/24HR 1 patch, 1 patch, TransDERmal, Daily, Maya Arndt MD, 1 patch at 10/13/22 0857    lidocaine 4 % external patch 1 patch, 1 patch, TransDERmal, Daily, LAILA Thomas CNP, 1 patch at 10/13/22 0856    atorvastatin (LIPITOR) tablet 40 mg, 40 mg, Oral, Nightly, Shadi Bender MD, 40 mg at 10/12/22 2036    glucagon (rDNA) injection 1 mg, 1 mg, SubCUTAneous, PRN, Susanna Carney MD    glucose chewable tablet 16 g, 4 tablet, Oral, PRN, Susanna Carney MD    OLANZapine zydis (ZYPREXA) disintegrating tablet 15 mg, 15 mg, Oral, Nightly, Susanna Carney MD, 15 mg at 10/12/22 2036    pantoprazole (PROTONIX) tablet 40 mg, 40 mg, Oral, BID, Susanna Carney MD, 40 mg at 10/13/22 0855    pregabalin (LYRICA) capsule 75 mg, 75 mg, Oral, BID, Susanna Carney MD, 75 mg at 10/13/22 0854    acetaminophen (TYLENOL) tablet 650 mg, 650 mg, Oral, Q6H PRN, Maya Arndt MD, 650 mg at 10/13/22 0429    ibuprofen (ADVIL;MOTRIN) tablet 400 mg, 400 mg, Oral, Q6H PRN, Maya Arndt MD, 400 mg at 10/13/22 0855    hydrOXYzine HCl (ATARAX) tablet 50 mg, 50 mg, Oral, TID PRN, Jodi Santiago MD, 50 mg at 10/13/22 0855    traZODone (DESYREL) tablet 50 mg, 50 mg, Oral, Nightly PRN, Jodi Santiago MD, 50 mg at 10/12/22 2037    polyethylene glycol (GLYCOLAX) packet 17 g, 17 g, Oral, Daily PRN, Jodi Santiago MD    aluminum & magnesium hydroxide-simethicone (MAALOX) 200-200-20 MG/5ML suspension 30 mL, 30 mL, Oral, Q6H PRN, Jodi Santiago MD    nicotine polacrilex (NICORETTE) gum 2 mg, 2 mg, Oral, Q2H PRN, Jodi Santiago MD    haloperidol lactate (HALDOL) injection 5 mg, 5 mg, IntraMUSCular, Q6H PRN **AND** LORazepam (ATIVAN) injection 2 mg, 2 mg, IntraMUSCular, Q6H PRN **AND** diphenhydrAMINE (BENADRYL) injection 50 mg, 50 mg, IntraMUSCular, Q6H PRN, Jodi Santiago MD    Examination:  /82   Pulse 99   Temp 97.9 °F (36.6 °C) (Oral)   Resp 14   Ht 5' 5\" (1.651 m)   Wt 207 lb (93.9 kg)   LMP  (LMP Unknown)   SpO2 100%   PF (!) 14 L/min   BMI 34.45 kg/m²   Gait - steady    HOSPITAL COURSE[de-identified]  Following admission to the hospital, patient had a complete physical exam and blood work up. The patient was referred to Internal Medicine. Patient was monitored closely with suicide precaution  Patient was started on olanzapine and other medications noted above. Her insight into her illness improved from the time of admission but continued to be somewhat poor. Was encouraged to participate in group and other milieu activity  Patient started to feel better with this combination of treatment. Significant progress in the symptoms since admission.     Mood is improved  The patient denies AVH or paranoid thoughts  The patient denies any hopelessness or worthlessness  No active SI/HI  Appetite:  [x] Normal  [] Increased  [] Decreased    Sleep:       [x] Normal  [] Fair       [] Poor            Energy:    [x] Normal  [] Increased  [] Decreased     SI [] Present  [x] Absent  HI  []Present  [x] Absent   Aggression:  [] yes  [] no  Patient is [x] able [] unable to CONTRACT FOR SAFETY   Medication side effects(SE):  [x] None(Psych. Meds.) [] Other      Mental Status Examination on discharge:    Level of consciousness:  within normal limits   Appearance: Wheelchair bound and in hospital attire behavior/Motor:  no abnormalities noted  Attitude toward examiner:  attentive and good eye contact  Speech:  spontaneous, normal rate and normal volume   Mood: anxious  Affect:  mood congruent  Thought processes:  coherent   Thought content:  Suicidal Ideation:  denies suicidal ideation  Delusions:  no evidence of delusions  Perceptual Disturbance:  denies any perceptual disturbance  Cognition:  oriented to person, place, and time   Concentration clinically adequate  Memory impaired recent and remote  Insight poor  Judgement fair   Fund of Knowledge adequate      ASSESSMENT:  Patient symptoms are:  [] Well controlled  [x] Improving  [] Worsening  [] No change      Diagnosis:  Principal Problem:    Unspecified psychosis not due to a substance or known physiological condition (HCC)  Active Problems:    Fentanyl use disorder, mild (HCC)    Unsp psychosis not due to a substance or known physiol cond ([facility]    Other hyperlipidemia    Cocaine use disorder ([facility]  Resolved Problems:    * No resolved hospital problems. *      LABS:    No results for input(s): WBC, HGB, PLT in the last 72 hours. No results for input(s): NA, K, CL, CO2, BUN, CREATININE, GLUCOSE in the last 72 hours. No results for input(s): BILITOT, ALKPHOS, AST, ALT in the last 72 hours. Lab Results   Component Value Date/Time    BARBSCNU NEGATIVE 09/08/2022 05:29 PM    LABBENZ NEGATIVE 09/08/2022 05:29 PM    LABMETH NEGATIVE 09/08/2022 05:29 PM    PPXUR NOT REPORTED 11/04/2021 03:43 PM     Lab Results   Component Value Date/Time    TSH 1.10 08/04/2022 12:21 AM     No results found for: LITHIUM  No results found for: VALPROATE, CBMZ    RISK ASSESSMENT AT DISCHARGE: Low risk for suicide and homicide.      Treatment Plan:  Reviewed current Medications with the patient. Education provided on the complaince with treatment. Risks, benefits, side effects, drug-to-drug interactions and alternatives to treatment were discussed. Encourage patient to attend outpatient follow up appointment and therapy. Patient was advised to call the outpatient provider, visit the nearest ED or call 911 if symptoms are not manageable. Medication List        START taking these medications      OLANZapine 10 MG tablet  Commonly known as: ZyPREXA  Take 1.5 tablets by mouth nightly  Notes to patient: Clear thoughts     pantoprazole 40 MG tablet  Commonly known as: PROTONIX  Take 1 tablet by mouth 2 times daily  Notes to patient: Acid reflux            CHANGE how you take these medications      blood glucose test strips  Test 3 times a day & as needed for symptoms of irregular blood glucose. Dispense sufficient amount for indicated testing frequency plus additional to accommodate PRN testing needs. What changed: Another medication with the same name was removed. Continue taking this medication, and follow the directions you see here.   Notes to patient: Diabetes management     traZODone 50 MG tablet  Commonly known as: DESYREL  Take 1 tablet by mouth nightly as needed for Sleep  What changed:   medication strength  how much to take  when to take this  reasons to take this  Notes to patient: Sleep            CONTINUE taking these medications      albuterol sulfate  (90 Base) MCG/ACT inhaler  Commonly known as: PROVENTIL;VENTOLIN;PROAIR  Inhale 2 puffs into the lungs every 4 hours as needed for Wheezing  Notes to patient: Wheezing     Alcohol Swabs 70 % Pads  Use 1 swab 3 times daily as needed     atorvastatin 40 MG tablet  Commonly known as: LIPITOR  Take 1 tablet by mouth nightly  Notes to patient: Cholesterol     budesonide-formoterol 80-4.5 MCG/ACT Aero  Commonly known as: Symbicort  Inhale 2 puffs into the lungs 2 times daily  Notes to patient: Airway management     fluticasone 50 MCG/ACT nasal spray  Commonly known as: FLONASE  1 spray by Each Nostril route daily  Notes to patient: Clear sinuses     [employer] Pen Needles 31G 31G X 8 MM Misc  Generic drug: Insulin Pen Needle  1 each by Does not apply route daily  Notes to patient: Diabetes management     Lancets Misc  1 each by Does not apply route 3 times daily  Notes to patient: Diabetes management     Lantus SoloStar 100 UNIT/ML injection pen  Generic drug: insulin glargine  Inject 30 Units into the skin 2 times daily  Notes to patient: Long acting insulin to mange diabetes     metFORMIN 1000 MG tablet  Commonly known as: GLUCOPHAGE  Take 1 tablet by mouth 2 times daily (with meals)  Notes to patient: Diabetes     Misc.  Devices Misc  1 PAIR OF DIABETIC SHOES (1 LEFT/ 1 RIGHT)  1-3 PAIRS OF INSERTS (LEFT/ RIGHT)            STOP taking these medications      acetaminophen 500 MG tablet  Commonly known as: TYLENOL     dicyclomine 10 MG capsule  Commonly known as: Bentyl     mirtazapine 7.5 MG tablet  Commonly known as: REMERON     mupirocin 2 % ointment  Commonly known as: BACTROBAN     venlafaxine 150 MG extended release capsule  Commonly known as: EFFEXOR XR               Where to Get Your Medications        These medications were sent to [facility] [address]  [address] [facility] [address]      Phone: [phone]   atorvastatin 40 MG tablet  blood glucose test strips  metFORMIN 1000 MG tablet  OLANZapine 10 MG tablet  pantoprazole 40 MG tablet  traZODone 50 MG tablet               Core Measures statement:   Not applicable      TIME SPENT - 28 MINUTES TO COMPLETE THE EVALUATION, DISCHARGE SUMMARY, MEDICATION RECONCILIATION AND FOLLOW UP CARE                                         Yocasta Oliver is a 56 y.o. female being evaluated Ryan Roth MD on 10/13/2022 at 10:24 AM    An electronic signature was used to authenticate this note. **This report has been created using voice recognition software. It may contain minor errors which are inherent in voice recognition technology. **

## 2022-10-13 NOTE — BH NOTE
[facility]  Discharge Note    Pt discharged with followings belongings:   Dental Appliances: None  Vision - Corrective Lenses: None  Hearing Aid: None  Jewelry: None  Body Piercings Removed: N/A  Clothing: Shirt  Other Valuables: Other (Comment) (No valuables arrived on [place] with patient. Only 1 shirt.)   Valuables returned to patient. Patient educated on aftercare instructions: medications. Report called in to [facility] to [place], [place] by staff  at 8:24 PM .Patient verbalize understanding of AVS:  patient refused to sign or review AVS.    Status EXAM upon discharge:  Mental Status and Behavioral Exam  Normal: No  Level of Assistance: Independent/Self  Facial Expression: Exaggerated, Brightened, Flat  Affect: Blunt  Level of Consciousness: Alert  Frequency of Checks: 4 times per hour, close  Mood:Normal: No  Mood: Anxious, Labile, Irritable  Motor Activity:Normal: No  Motor Activity: Unusual posture/gait  Eye Contact: Good  Observed Behavior: Preoccupied, Impulsive  Sexual Misconduct History: Current - no  Involved In Any Sexual Misconduct With Others? : No  History of Sexually Inappropriate Behavior When Previously Hospitalized?: No  Uncontrollable/Compulsive Masturbation?: No  Difficulty Controlling Sexual Impulses?: No  Preception: [place] to person, [place] to time, [place] to place, [place] to situation  Attention:Normal: No  Attention: Distractible  Thought Processes: Circumstantial  Thought Content:Normal: No  Thought Content: Preoccupations  Depression Symptoms: Crying, Increased irritability  Anxiety Symptoms: Generalized  Jennifer Symptoms: No problems reported or observed.   Hallucinations: None  Delusions: No  Delusions: Controlling  Memory:Normal: No  Memory: Poor remote  Insight and Judgment: No  Insight and Judgment: Poor judgment, Poor insight    Tobacco Screening:  Practical Counseling, on admission, clayton X, if applicable and completed (first 3 are required if patient doesn't refuse):            ( ) Recognizing danger situations (included triggers and roadblocks)                    ( ) Coping skills (new ways to manage stress,relaxation techniques, changing routine, distraction)                                                           ( ) Basic information about quitting (benefits of quitting, techniques in how to quit, available resources  ( ) Referral for counseling faxed to [facility]                                                                                                                   (X) Patient refused counseling  ( ) Patient refused referral  ( ) Patient refused prescription upon discharge  ( ) Patient has not smoked in the last 30 days    Metabolic Screening:    Lab Results   Component Value Date    LABA1C 8.0 (H) 10/06/2022       Lab Results   Component Value Date    CHOL 126 08/04/2022    CHOL 275 (H) 12/30/2020    CHOL 190 12/18/2019    CHOL 149 12/09/2019    CHOL 180 12/03/2019     Lab Results   Component Value Date    TRIG 119 08/04/2022    TRIG 246 (H) 12/30/2020    TRIG 90 12/18/2019    TRIG 102 12/09/2019    TRIG 120 12/03/2019     Lab Results   Component Value Date    HDL 40 (L) 08/04/2022    HDL 80 12/30/2020    HDL 72 12/18/2019    HDL 59 12/09/2019    HDL 64 12/03/2019     No components found for: LDLCAL  No results found for: Mandi Delgado LPN

## 2022-10-13 NOTE — PLAN OF CARE
Problem: Chronic Conditions and Co-morbidities  Goal: Patient's chronic conditions and co-morbidity symptoms are monitored and maintained or improved  10/12/2022 2216 by Vandana Martinez LPN  Outcome: Progressing  10/12/2022 1719 by Rosemary Corona LPN  Outcome: Progressing     Problem: Anxiety  Goal: Will report anxiety at manageable levels  Description: INTERVENTIONS:  1. Administer medication as ordered  2. Teach and rehearse alternative coping skills  3. Provide emotional support with 1:1 interaction with staff  Outcome: Progressing  Note: Patient states she has some anxiety this shift. Patient educated and agrees to come to staff with any concerns. Will continue to be monitored every 15 minutes per environmental safety checks. Problem: Confusion  Goal: Confusion, delirium, dementia, or psychosis is improved or at baseline  Description: INTERVENTIONS:  1. Assess for possible contributors to thought disturbance, including medications, impaired vision or hearing, underlying metabolic abnormalities, dehydration, psychiatric diagnoses, and notify attending LIP  2. [place] high risk fall precautions, as indicated  3. Provide frequent short contacts to provide reality reorientation, refocusing and direction  4. Decrease environmental stimuli, including noise as appropriate  5. Monitor and intervene to maintain adequate nutrition, hydration, elimination, sleep and activity  6. If unable to ensure safety without constant attention obtain sitter and review sitter guidelines with assigned personnel  7. Initiate Psychosocial CNS and Spiritual Care consult, as indicated  Outcome: Progressing  Note: Patient has no signs of confusion this shift. Patient educated and agrees to come to staff with any concerns. Will continue to be monitored every 15 minutes per environmental safety checks.      Problem: Safety - Adult  Goal: Free from fall injury  10/12/2022 2216 by Vandana Martinez LPN  Outcome: Progressing  Note: Patient has been free from fall this shift. Patient educated and agrees to come to staff with any concerns. Will continue to be monitored every 15 minutes per environmental safety checks. 10/12/2022 1745 by Donnie Springer LPN  Outcome: Progressing  Note: Patient remains free of falls and verbalizes understanding of individual fall risks. Patient utilizes wheelchair in the dayroom and is wearing non skid footwear and encouraged to seek out staff for any assistance needed. Problem: Pain  Goal: Verbalizes/displays adequate comfort level or baseline comfort level  10/12/2022 2216 by Dom Nuñez LPN  Outcome: Progressing  Note: Patient has no c/o pain this at this time. Patient educated and agrees to come to staff with any concerns. Will continue to be monitored every 15 minutes per environmental safety checks. 10/12/2022 1745 by Donnie Springer LPN  Outcome: Progressing  Flowsheets (Taken 10/2/2022 2347 by Andre Perez RN)  Verbalizes/displays adequate comfort level or baseline comfort level:   Encourage patient to monitor pain and request assistance   Assess pain using appropriate pain scale   Administer analgesics based on type and severity of pain and evaluate response   Implement non-pharmacological measures as appropriate and evaluate response   Consider cultural and social influences on pain and pain management  Note: No pain reported this shift. 10/12/2022 1719 by Donnie Springer LPN  Outcome: Progressing  Flowsheets (Taken 10/2/2022 2347 by Andre Perez, RN)  Verbalizes/displays adequate comfort level or baseline comfort level:   Encourage patient to monitor pain and request assistance   Assess pain using appropriate pain scale   Administer analgesics based on type and severity of pain and evaluate response   Implement non-pharmacological measures as appropriate and evaluate response   Consider cultural and social influences on pain and pain management  Note: No pain reported this shift. Problem: Self Harm/Suicidality  Goal: Will have no self-injury during hospital stay  Description: INTERVENTIONS:  1. Q 30 MINUTES: Routine safety checks  2. Q SHIFT & PRN: Assess risk to determine if routine checks are adequate to maintain patient safety  10/12/2022 2216 by Jed Landeros LPN  Outcome: Progressing  Note: Patient has no thoughts of harm to self or others this shift. Patient educated and agrees to come to staff with any concerns. Will continue to be monitored every 15 minutes per environmental safety checks. 10/12/2022 1745 by Noemy Vick LPN  Outcome: Progressing  Note:    Patient denies thoughts of suicide or self-harm and agrees to seek out staff should negative thoughts arise. Patient denies hallucinations. Patient reports anxiety. Patient labile and irritable at times but is easily redirected. Attending groups and tending to ADL's. Patient is compliant with meds. Safe environment maintained. Q15 minute checks for safety continued per unit policy.  Will continue to monitor for safety and provide support and reassurance as needed

## 2022-11-18 NOTE — CARE COORDINATION
From: Chrystal Monroe  To: Stephanie Reid  Sent: 11/18/2022 12:16 PM CST  Subject: My knee    I have had a lot of pain in my knee. I have not taken any pain medication during these days. Could you recommend an over the counter anti-inflammatory?   Referral from STEPHON Jeong, RN-  This gal is agreeable to working with you. She is insulin dependent. Her A1c on 11.4.21 was 11.9. Her range is 6.13.21 was 11.1, 9.16.21 was 14.2. She reports she is eating 3 meals a day. I am not sure if this is correct. 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 Call to patient. Patient states she is currently admitted to [facility],  States having heart problems. Will reach out to patient when discharged   For consult.   FRANCESCO Mariee

## 2023-01-01 NOTE — PLAN OF CARE
Patient requests stronger pain med prior to dressing changes. Fentanyl ordered once. Patient tolerated 2 dressing changes during shift. Nurse
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
## 2023-10-18 NOTE — ED NOTES
Report given to [facility], RN  07/07/20 5601 What Type Of Note Output Would You Prefer (Optional)?: Standard Output Hpi Title: Evaluation of Skin Lesions How Severe Are Your Spot(S)?: mild Have Your Spot(S) Been Treated In The Past?: has not been treated

## 2024-02-12 NOTE — ED NOTES
Chief Complaint      FU  Right breast cancer     History of Present Illness[facility]  PC[facility] Reyna zee      76-year-old -American female here for FU  Right breast cancer     History:  1) No abnormal mammograms, breast biopsies or breast surgeries  2) May 1, 2023. Bilateral screening mammogram with tomosynthesis. Breast tissue is heterogeneously dense. Left breast negative. Right breast mass with calcifications, BI-RADS 0.  3) June 1, 2023. Left breast diagnostic imaging. Scattered fibroglandular tissue. Left breast irregular mass persists in the upper outer quadrant. There are associated irregular pleomorphic calcifications. The entire area of mass and calcifications spans approximately 3.3 x 2.3 x 1.8 cm. Targeted right breast ultrasound was performed. At 10:00 7 cm from the nipple a 2.8 x 1.6 x 3.0 cm is noted that corresponds to the previously seen hamartoma noted at 9:00 9 cm from the nipple. At 11:00 4 cm from the nipple a 1.9 x 2.0 x 1.7 cm irregular mass is noted, BI-RADS 4. Right axillary ultrasound is negative.  4) July 7, 2023 right breast mass 11:00 4 cm from the nipple biopsy. 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 ER greater than 95%, DC greater than 95%, HER2 2+ FISH not amplified. Open coil HydroMARK in position.  5) August 24, 2023. Right breast bracketed partial mastectomy. 2.0 cm invasive ductal carcinoma with associated DCIS. Final margins negative. New lateral margin is focally close. pT1c pNx  6) completed XRT mccullough  7) arimidex. Bety         She presents today for FU  No breast concerns.      Ob/gyn history  menarche 15  menopause 45  G0  no OCPs, no fertility treatments, no HRT     No family history of breast or ovarian cancer.        Review of Systems  A comprehensive ROS was taken on the patient intake form and reviewed with the patient. This form is scanned into the electronic medical record.     Constitutional symptoms: Denies generalized fatigue. Denies  Pt called out in pain, pt notified orders were just released and waiting on pharmacy to verify medication and will give medication when verified.       Randi Chilel RN  06/07/21 2018 weight change, fevers/chills, difficulty sleeping   Eyes: Denies double vision, glaucoma, cataracts.  Ear/nose/throat/mouth: Denies hearing changes, sore throat, sinus problems.  Cardiovascular: No chest pain. Denies irregular heartbeat. Denies ankle swelling.  Respiratory: No wheezing, cough, or shortness of breath.  Gastrointestinal: No abdominal pain, No nausea/vomiting. No indigestion/heartburn. No change in bowel habits. No constipation or diarrhea.   Genitourinary: No urinary incontinence. No urinary frequency. No painful urination.  Musculoskeletal: No bone pain, no muscle pain, no joint pain.   Integumentary: No rash. No masses. No changes in moles. No easy bruising.  Neurological: No headaches. No tremors. No numbness/tingling.  Psychiatric: No anxiety. No depression.  Endocrine: No excessive thirst. Not too hot or too cold. Not tired or fatigued.   Hematological/lymphatic: No swollen glands or blood clotting problems. No bruising.     Physical Exam  A chaperone was offered for all portions of the physical exam. The patient declined.      General appearance: appears stated age, alert and oriented x 3  Head: Normocephalic, atraumatic  Eyes: conjunctivae/corneas clear.  Ears: External ears are normal, hearing is grossly intact.  Lungs: normal breathing  Heart: regular   Abdomen: Soft, nontender, nondistended.  Neurologic: grossly normal  Lymph nodes: No cervical, supraclavicular or axillary lymphadenopathy bilaterally     Breast: A comprehensive breast exam was performed in the seated and supine positions. Breasts are symmetrical. Bilateral nipples are everted. There are no skin changes on arm maneuvers. Bra size: C            Right: healed incision. Radiation changes. No masses.             Left: no masses              Results/Data  Imaging     reviewed by me  see HPI     Pathology  July 7, 2023 right breast mass 11:00 4 cm from the nipple biopsy. 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 ER  greater than 95%, DC greater than 95%, HER2 2+ FISH not amplified. Open coil HydroMARK in position.     Provider Impressions     1) 76-year-old -American female here with new right breast cancer, cT2 cN0 grade 2 IDC, ER/DC > 95% Her2 non amp. now s/p right bracket PM, pT1c pNx margins neg. completed radiation.  Arimidex.  2) comorbidities include diabetes, hypertension. Non-smoker.  3) no family history of breast or ovarian cancer.     Patient Discussion/Summary     She is here alone.  She completed radiation.  She is tolerating Arimidex.  Clinical exam is normal.  We discussed follow-up.  Bilateral mammogram is due in May.  She can do this in June as she just completed radiation.  She can follow-up after her mammogram.  She should call the office with any sooner concerns.

## 2025-04-01 NOTE — PROGRESS NOTES
OBS/CDU   RESIDENT NOTE      Patients PCP is:  Keily Brown MD        SUBJECTIVE      Patient's glucose this morning over 500, admits to nausea but no vomiting, denies abdominal pain. Spoke with patient about abstaining from juice, regular pop. Patient was started on Lantus yesterday. Has been able to tolerate a full diet without nausea or vomiting. The patient is urinating on her own and is passing flatus. Denies fever, chills, nausea, vomiting, chest pain, shortness of breath, abdominal pain, focal weakness, numbness, tingling, urinary/bowel symptoms, vision changes, visual hallucinations, or headache. PHYSICAL EXAM      General: NAD, AO X 3  Heent: EMOI, PERRL  Neck: SUPPLE, NO JVD  Cardiovascular: RRR, S1S2  Pulmonary: CTAB, NO SOB  Abdomen: SOFT, NTTP, ND, +BS  Extremities: +2/4 PULSES DISTAL, NO SWELLING  Neuro / Psych: NO NUMBNESS OR TINGLING, MENTATION AT BASELINE    PERTINENT TEST /EXAMS      I have reviewed all available laboratory results.     MEDICATIONS CURRENT   insulin glargine (LANTUS) injection vial 20 Units, BID  cyclobenzaprine (FLEXERIL) tablet 10 mg, TID PRN  gabapentin (NEURONTIN) capsule 900 mg, TID  glucose (GLUTOSE) 40 % oral gel 15 g, PRN  dextrose 50 % IV solution, PRN  glucagon (rDNA) injection 1 mg, PRN  dextrose 5 % solution, PRN  traZODone (DESYREL) tablet 150 mg, Nightly  pantoprazole (PROTONIX) tablet 40 mg, BID AC  sodium chloride flush 0.9 % injection 5-40 mL, 2 times per day  sodium chloride flush 0.9 % injection 5-40 mL, PRN  0.9 % sodium chloride infusion, PRN  enoxaparin (LOVENOX) injection 40 mg, Daily  acetaminophen (TYLENOL) tablet 650 mg, Q4H PRN  ondansetron (ZOFRAN-ODT) disintegrating tablet 4 mg, Q8H PRN   Or  ondansetron (ZOFRAN) injection 4 mg, Q6H PRN  0.9 % sodium chloride infusion, Continuous  HYDROcodone-acetaminophen (NORCO) 5-325 MG per tablet 1 tablet, Q4H PRN   Or  HYDROcodone-acetaminophen (NORCO) 5-325 MG per tablet 2 tablet, Q4H stated

## 2025-06-17 ENCOUNTER — ANESTHESIA (OUTPATIENT)
Dept: OPERATING ROOM | Age: 60
End: 2025-06-17
Payer: MEDICARE

## 2025-06-17 ENCOUNTER — ANESTHESIA EVENT (OUTPATIENT)
Dept: OPERATING ROOM | Age: 60
End: 2025-06-17
Payer: MEDICARE

## 2025-06-17 PROBLEM — L02.214 ABSCESS OF RIGHT GROIN: Status: ACTIVE | Noted: 2025-06-17

## 2025-06-17 PROCEDURE — 2580000003 HC RX 258: Performed by: STUDENT IN AN ORGANIZED HEALTH CARE EDUCATION/TRAINING PROGRAM

## 2025-06-17 PROCEDURE — 6360000002 HC RX W HCPCS: Performed by: STUDENT IN AN ORGANIZED HEALTH CARE EDUCATION/TRAINING PROGRAM

## 2025-06-17 PROCEDURE — 6360000002 HC RX W HCPCS: Performed by: NURSE ANESTHETIST, CERTIFIED REGISTERED

## 2025-06-17 RX ORDER — ONDANSETRON 2 MG/ML
INJECTION INTRAMUSCULAR; INTRAVENOUS
Status: DISCONTINUED | OUTPATIENT
Start: 2025-06-17 | End: 2025-06-17 | Stop reason: SDUPTHER

## 2025-06-17 RX ORDER — PROPOFOL 10 MG/ML
INJECTION, EMULSION INTRAVENOUS
Status: DISCONTINUED | OUTPATIENT
Start: 2025-06-17 | End: 2025-06-17 | Stop reason: SDUPTHER

## 2025-06-17 RX ORDER — LIDOCAINE HYDROCHLORIDE 10 MG/ML
INJECTION, SOLUTION EPIDURAL; INFILTRATION; INTRACAUDAL; PERINEURAL
Status: DISCONTINUED | OUTPATIENT
Start: 2025-06-17 | End: 2025-06-17 | Stop reason: SDUPTHER

## 2025-06-17 RX ORDER — FENTANYL CITRATE 50 UG/ML
INJECTION, SOLUTION INTRAMUSCULAR; INTRAVENOUS
Status: DISCONTINUED | OUTPATIENT
Start: 2025-06-17 | End: 2025-06-17 | Stop reason: SDUPTHER

## 2025-06-17 RX ADMIN — PROPOFOL 150 MG: 10 INJECTION, EMULSION INTRAVENOUS at 15:04

## 2025-06-17 RX ADMIN — FENTANYL CITRATE 50 MCG: 50 INJECTION, SOLUTION INTRAMUSCULAR; INTRAVENOUS at 15:04

## 2025-06-17 RX ADMIN — ONDANSETRON 4 MG: 2 INJECTION, SOLUTION INTRAMUSCULAR; INTRAVENOUS at 15:50

## 2025-06-17 RX ADMIN — FENTANYL CITRATE 50 MCG: 50 INJECTION, SOLUTION INTRAMUSCULAR; INTRAVENOUS at 15:08

## 2025-06-17 RX ADMIN — CEFEPIME 2000 MG: 2 INJECTION, POWDER, FOR SOLUTION INTRAVENOUS at 15:26

## 2025-06-17 RX ADMIN — LIDOCAINE HYDROCHLORIDE 50 MG: 10 INJECTION, SOLUTION EPIDURAL; INFILTRATION; INTRACAUDAL; PERINEURAL at 15:04

## 2025-06-17 NOTE — ANESTHESIA PRE PROCEDURE
Department of Anesthesiology  Preprocedure Note       Name:  Terri Palmer   Age:  60 y.o.  :  1965                                          MRN:  [id number]         Date:  2025      Surgeon: Surgeon(s):  Romel De Paz IV, DO    Procedure: Procedure(s):  RIGHT GROIN DEBRIDEMENT    Medications prior to admission:   Prior to Admission medications    Medication Sig Start Date End Date Taking? Authorizing Provider   acetaminophen (TYLENOL) 325 MG tablet Take 2 tablets by mouth Daily   Yes Cate Hicks MD   acetaZOLAMIDE (DIAMOX) 250 MG tablet Take 500 mg by mouth daily   Yes Cate Hicks MD   Ascorbic Acid (VITAMIN C) 250 MG tablet Take 1 tablet by mouth 2 times daily   Yes Cate Hicks MD   aspirin 81 MG EC tablet Take 1 tablet by mouth daily   Yes Cate Hicks MD   brimonidine (ALPHAGAN) 0.2 % ophthalmic solution Place 1 drop into the right eye 2 times daily   Yes Cate Hicks MD   carvedilol (COREG) 3.125 MG tablet Take 1 tablet by mouth 2 times daily (with meals) Hold if SBP <110 or HR <60   Yes Cate Hicks MD   clopidogrel (PLAVIX) 75 MG tablet Take 1 tablet by mouth daily   Yes Cate Hicks MD   dicyclomine (BENTYL) 20 MG tablet Take 1 tablet by mouth 2 times daily   Yes Cate Hicks MD   dorzolamide (TRUSOPT) 2 % ophthalmic solution Place 1 drop into the right eye 3 times daily   Yes Cate Hicks MD   ferrous sulfate (IRON 325) 325 (65 Fe) MG tablet Take 1 tablet by mouth 2 times daily   Yes Cate Hicks MD   fluticasone (FLOVENT HFA) 110 MCG/ACT inhaler Inhale 1 puff into the lungs 2 times daily   Yes Cate Hicks MD   gabapentin (NEURONTIN) 100 MG capsule Take 1 capsule by mouth 3 times daily.   Yes Cate Hicks MD   glucagon 1 MG injection Inject 1 mg into the muscle as needed (Administer if BS <70, unresponsive, or NPO)   Yes Cate Hicks MD   latanoprost (XALATAN) 0.005 %

## 2025-06-17 NOTE — ANESTHESIA POSTPROCEDURE EVALUATION
Department of Anesthesiology  Postprocedure Note    Patient: Terri Palmer  MRN: [id number]  YOB: 1965  Date of evaluation: 6/17/2025    Procedure Summary       Date: 06/17/25 Room / Location: [address]    Anesthesia Start: 1459 Anesthesia Stop: 1558    Procedure: RIGHT GROIN DEBRIDEMENT (Right: Groin) Diagnosis:       Wound of right groin, initial encounter      (Wound of right groin, initial encounter [S31.109A])    Surgeons: Romel De Paz IV, DO Responsible Provider: Ramses Kendrick MD    Anesthesia Type: general ASA Status: 3 - Emergent            Anesthesia Type: No value filed.    Elin Phase I: Elin Score: 9    Elin Phase II:      Anesthesia Post Evaluation    Patient location during evaluation: PACU  Patient participation: complete - patient participated  Level of consciousness: awake and awake and alert  Pain score: 1  Nausea & Vomiting: no nausea and no vomiting  Cardiovascular status: hemodynamically stable and blood pressure returned to baseline  Respiratory status: acceptable  Hydration status: euvolemic  Multimodal analgesia pain management approach  Pain management: adequate    No notable events documented.

## 2025-06-18 PROBLEM — J96.02 ACUTE RESPIRATORY FAILURE WITH HYPERCAPNIA (HCC): Status: RESOLVED | Noted: 2018-05-21 | Resolved: 2025-06-18

## 2025-06-18 NOTE — ADDENDUM NOTE
Addendum  created 06/18/25 1300 by Jose Francisco Beckwith APRN - FAIZAN    Intraprocedure Meds edited

## (undated) DEVICE — STRIP,CLOSURE,WOUND,MEDI-STRIP,1/2X4: Brand: MEDLINE

## (undated) DEVICE — BANDAGE GZ W2XL75IN ST RAYON POLY CNFRM STRTCH LTWT

## (undated) DEVICE — MICRO DUAL CUT (9.0 X 0.38 X 25.0MM)

## (undated) DEVICE — Z DISCONTINUED BY MEDLINE USE 2711682 TRAY SKIN PREP DRY W/ PREM GLV

## (undated) DEVICE — GARMENT,MEDLINE,DVT,INT,CALF,MED, GEN2: Brand: MEDLINE

## (undated) DEVICE — SVMMC POD PK

## (undated) DEVICE — CUFF REPROC TRNQT DPSB W/PLC RED 18IN

## (undated) DEVICE — NEEDLE HYPO 25GA L1.5IN BLU POLYPR HUB S STL REG BVL STR

## (undated) DEVICE — SYRINGE MED 10ML LUERLOCK TIP W/O SFTY DISP

## (undated) DEVICE — HANDPIECE SET WITH COAXIAL HIGH FLOW TIP AND SUCTION TUBE: Brand: INTERPULSE

## (undated) DEVICE — SVMMC HND

## (undated) DEVICE — GLOVE ORANGE PI 8 1/2   MSG9085

## (undated) DEVICE — SOLUTION SCRB 4OZ 10% POVIDONE IOD ANTIMIC BTL

## (undated) DEVICE — YANKAUER,FLEXIBLE HANDLE,REGLR CAPACITY: Brand: MEDLINE INDUSTRIES, INC.

## (undated) DEVICE — GOWN,SIRUS,NONRNF,SETINSLV,XL,20/CS: Brand: MEDLINE

## (undated) DEVICE — SUTURE ETHLN SZ 4-0 L18IN NONABSORBABLE BLK L19MM PS-2 3/8 1667H

## (undated) DEVICE — GLOVE ORANGE PI 8   MSG9080

## (undated) DEVICE — INTENDED FOR TISSUE SEPARATION, AND OTHER PROCEDURES THAT REQUIRE A SHARP SURGICAL BLADE TO PUNCTURE OR CUT.: Brand: BARD-PARKER ® CARBON RIB-BACK BLADES

## (undated) DEVICE — DEFENDO AIR WATER SUCTION AND BIOPSY VALVE KIT FOR  OLYMPUS: Brand: DEFENDO AIR/WATER/SUCTION AND BIOPSY VALVE

## (undated) DEVICE — SWAB CULT CLR BLU PLAS RAYON LIQ AMIES AERB ANAERB FRIC CAP

## (undated) DEVICE — TUBING, SUCTION, 9/32" X 20', STRAIGHT: Brand: MEDLINE INDUSTRIES, INC.

## (undated) DEVICE — FORCEPS BX L240CM WRK CHN 2.8MM STD CAP W/ NDL MIC MESH

## (undated) DEVICE — ENDO KIT W/SYRINGE: Brand: MEDLINE INDUSTRIES, INC.

## (undated) DEVICE — HYPODERMIC SAFETY NEEDLE: Brand: MAGELLAN

## (undated) DEVICE — COLLECTOR SPEC RAYON LIQ STUART DBL FOR THRT VAG SKIN WND

## (undated) DEVICE — GLOVE SURG SZ 65 THK91MIL LTX FREE SYN POLYISOPRENE

## (undated) DEVICE — SOLUTION SCRB 4OZ 4% CHG H2O AIDED FOR PREOPERATIVE SKIN

## (undated) DEVICE — GLOVE ORANGE PI 7 1/2   MSG9075

## (undated) DEVICE — GOWN,AURORA,NONREINFORCED,LARGE: Brand: MEDLINE

## (undated) DEVICE — THIN OFFSET (9.0 X 0.38 X 25.0MM)

## (undated) DEVICE — ADAPTER CIRC OD22XID15MM FOR MON VENT PARAMETER

## (undated) DEVICE — SUTURE NONABSORBABLE MONOFILAMENT 4-0 PS-2 18 IN BLK ETHILON 1667G

## (undated) DEVICE — GLOVE SURG SZ 6 THK91MIL LTX FREE SYN POLYISOPRENE ANTI

## (undated) DEVICE — BITEBLOCK 54FR W/ DENT RIM BLOX

## (undated) DEVICE — SUTURE MCRYL 3-0 L27IN ABSRB VLT SH L26MM 1/2 CIR Y316H

## (undated) DEVICE — SUTURE PROL SZ 3-0 L30IN NONABSORBABLE BLU L26MM SH 1/2 CIR 8832H

## (undated) DEVICE — THIN OFFSET (9.0 X 0.38 X 31.0MM)

## (undated) DEVICE — CONTAINER,SPECIMEN,4OZ,OR STRL: Brand: MEDLINE

## (undated) DEVICE — BANDAGE,GAUZE,BULKEE II,4.5"X4.1YD,STRL: Brand: MEDLINE

## (undated) DEVICE — ELECTRODE PT RET AD L9FT HI MOIST COND ADH HYDRGEL CORDED